# Patient Record
Sex: FEMALE | Race: WHITE | NOT HISPANIC OR LATINO | Employment: OTHER | URBAN - METROPOLITAN AREA
[De-identification: names, ages, dates, MRNs, and addresses within clinical notes are randomized per-mention and may not be internally consistent; named-entity substitution may affect disease eponyms.]

---

## 2019-10-31 ENCOUNTER — TRANSFERRED RECORDS (OUTPATIENT)
Dept: HEALTH INFORMATION MANAGEMENT | Facility: CLINIC | Age: 49
End: 2019-10-31

## 2020-01-17 ENCOUNTER — TRANSFERRED RECORDS (OUTPATIENT)
Dept: HEALTH INFORMATION MANAGEMENT | Facility: CLINIC | Age: 50
End: 2020-01-17

## 2020-05-28 ENCOUNTER — TRANSFERRED RECORDS (OUTPATIENT)
Dept: HEALTH INFORMATION MANAGEMENT | Facility: CLINIC | Age: 50
End: 2020-05-28

## 2020-07-20 NOTE — TELEPHONE ENCOUNTER
RECORDS RECEIVED FROM: External - New, newly dx'd w/CUETO   Appt Date: 09.28.2020   NOTES STATUS DETAILS   OFFICE NOTE from referring provider N/A    OFFICE NOTES from other specialists Care Everywhere 08.19.2020, Rivera Dowling MD      08.10.2020 Charles Rubin M.B.B.S [facility]   DISCHARGE SUMMARY from hospital Care Everywhere 07.29.2020 Matthew Oconnell MD     MEDICATION LIST N/A    LIVER BIOSPY (IF APPLICABLE)      PATHOLOGY REPORTS  N/A    IMAGING     ENDOSCOPY (IF AVAILABLE) Care Everywhere 10.15.2019   COLONOSCOPY (IF AVAILABLE) Care Everywhere 10.15.2019   ULTRASOUND LIVER Care Everywhere 07.31.2020, 07.30.2020,  06.08.2020 06.02.2020   CT OF ABDOMEN Care Everywhere 06.25.2020, 06.04.2020    MRI OF LIVER Care Everywhere  06.30.2020 12.04.2019   FIBROSCAN, US ELASTOGRAPHY, FIBROSIS SCAN, MR ELASTOGRAPHY N/A    LABS     HEPATIC PANEL (LIVER PANEL) Care Everywhere 06.30.2020   BASIC METABOLIC PANEL Care Everywhere 10.17.2019   COMPLETE METABOLIC PANEL Care Everywhere 06.30.2020   COMPLETE BLOOD COUNT (CBC) Care Everywhere 06.30.2020   INTERNATIONAL NORMALIZED RATIO (INR) Care Everywhere 03.04.2020   HEPATITIS C ANTIBODY Care Everywhere 10.22.2019   HEPATITIS C VIRAL LOAD/PCR N/A    HEPATITIS C GENOTYPE N/A    HEPATITIS B SURFACE ANTIGEN Care Everywhere 10.22.2019   HEPATITIS B SURFACE ANTIBODY Care Everywhere 10.29.2019   HEPATITIS B DNA QUANT LEVEL N/A    HEPATITIS B CORE ANTIBODY N/A      Action 08.17.2020 RM   Action Taken Called [facility] to get images pushed, called [phone] sent fax to [facility] for other images [phone]    08.19.2020 Sent to [facility] fax the fax number is [phone]    08.20.2020 All images received.

## 2020-07-29 ENCOUNTER — TRANSFERRED RECORDS (OUTPATIENT)
Dept: HEALTH INFORMATION MANAGEMENT | Facility: CLINIC | Age: 50
End: 2020-07-29

## 2020-07-31 ENCOUNTER — TRANSFERRED RECORDS (OUTPATIENT)
Dept: HEALTH INFORMATION MANAGEMENT | Facility: CLINIC | Age: 50
End: 2020-07-31

## 2020-07-31 LAB — EJECTION FRACTION: 60 %

## 2020-08-19 ENCOUNTER — TRANSFERRED RECORDS (OUTPATIENT)
Dept: HEALTH INFORMATION MANAGEMENT | Facility: CLINIC | Age: 50
End: 2020-08-19

## 2020-08-23 ENCOUNTER — TRANSFERRED RECORDS (OUTPATIENT)
Dept: HEALTH INFORMATION MANAGEMENT | Facility: CLINIC | Age: 50
End: 2020-08-23

## 2020-09-13 ENCOUNTER — MEDICAL CORRESPONDENCE (OUTPATIENT)
Dept: HEALTH INFORMATION MANAGEMENT | Facility: CLINIC | Age: 50
End: 2020-09-13

## 2020-09-14 ENCOUNTER — MEDICAL CORRESPONDENCE (OUTPATIENT)
Dept: HEALTH INFORMATION MANAGEMENT | Facility: CLINIC | Age: 50
End: 2020-09-14

## 2020-09-16 ENCOUNTER — REFERRAL (OUTPATIENT)
Dept: TRANSPLANT | Facility: CLINIC | Age: 50
End: 2020-09-16

## 2020-09-16 DIAGNOSIS — K75.81 NASH (NONALCOHOLIC STEATOHEPATITIS): Primary | ICD-10-CM

## 2020-09-16 NOTE — LETTER
10/12/2020    Susan Puckett  [address] [facility] [address]      Dear Susan,    Thank you for your interest in the Transplant Center at [facility], [facility]. We look forward to being a part of your care team and assisting you through the transplant process.    As we discussed, your transplant coordinator is Daja Suarez (Liver).  You may call your coordinator at any time with questions or concerns call [phone].    Please complete the following.    1. Fill out and return the enclosed forms    Authorization for Electronic Communication    Authorization to Discuss Protected Health Information    Authorization for Release of Protected Health Information    Authorization for Care Everywhere Release of Information    2. Sign up for:    [employer], access to your electronic medical record (see enclosed pamphlet)    [employer], a transplant education website    You can use these tools to learn more about your transplant, communicate with your care team, and track your medical details      Sincerely,  Solid Organ Transplant  [facility], [facility]    cc: Referring Physician and PCP

## 2020-09-16 NOTE — LETTER
LIVER TRANSPLANT  EVALUATION SCHEDULE    Patient:   Susan Puckett  MR#:    [id number]  Coordinator:  Daja       [phone]  :     Che               [phone]  Location:    [facility]  Date(s):    October 19, 2020 & October 20, 2020    This is your evaluation schedule, please follow dates and times.  You will   receive reminder phone calls for other tests, but please follow this schedule  only!  If you have any questions about dates and times, please call us on  number listed above.  Thank you, Transplant Services         Day/Date:    Monday, October 19, 2020  Time Location Activity   1:30 PM Telephone Visit: You will receive a Telephone call to: [phone] Appointment with Kelin Mcginnis  Registered Dietitian   2:00 PM Telephone Visit: You will receive a Telephone call to: [phone] Appointment with Juanita Wells                               *NO FOOD or DRINK AFTER 11:00 PM*  (You may only have small amounts of water)        Day/Date:    Tuesday, October 20, 2020  Time Location Activity   7:15 AM Imaging and Lab testing  (1st floor [facility]   [address] [place] [address]) Blood tests    7:30 AM Imaging and Lab testing  (1st floor [facility] ) Liver Ultrasound with dopplers=NO FOOD or DRINK8 HOURS BEFORE TEST!!!   8:30 AM Imaging and Lab Testing  (1st floor [facility] ) Dexa Scan HOLD Calcium pills & supplements 48 hours before test!   9:00 AM Transplant Services  (3rd floor [facility]) Appointment with Dr. Tejada,  Transplant Surgeon   9:40 AM Imaging and Lab testing  (1st floor [facility] ) EKG   *10:00 AM [employer] Shuttle  1st Floor/Entrance of Clinic [facility]  Take [employer] Shuttle to Hospital  (Shuttle is White with Maroon Lettering)   11:00 AM [facility] Waiting Room  (2nd floor [facility]  [address] [place] [address]) Dobutamine  Stress Echo = SEE ENCLOSED INSTRUCTIONS for TEST!

## 2020-09-25 VITALS — BODY MASS INDEX: 21.53 KG/M2 | HEIGHT: 66 IN | WEIGHT: 134 LBS

## 2020-09-25 PROBLEM — H02.409 PTOSIS OF EYELID: Status: ACTIVE | Noted: 2020-07-22

## 2020-09-25 PROBLEM — H53.40 VISUAL FIELD DEFECT: Status: ACTIVE | Noted: 2020-07-22

## 2020-09-25 PROBLEM — F41.9 ANXIETY: Status: ACTIVE | Noted: 2018-12-16

## 2020-09-25 PROBLEM — D61.818 PANCYTOPENIA (H): Status: ACTIVE | Noted: 2020-07-29

## 2020-09-25 PROBLEM — N80.9 ENDOMETRIOSIS: Status: ACTIVE | Noted: 2020-05-28

## 2020-09-25 PROBLEM — R79.89 LACTATE BLOOD INCREASE: Status: ACTIVE | Noted: 2020-09-09

## 2020-09-25 PROBLEM — J91.8 PLEURAL EFFUSION ASSOCIATED WITH HEPATIC DISORDER: Status: ACTIVE | Noted: 2020-08-11

## 2020-09-25 PROBLEM — D12.6 TUBULAR ADENOMA OF COLON: Status: ACTIVE | Noted: 2020-01-22

## 2020-09-25 PROBLEM — D12.6: Status: ACTIVE | Noted: 2020-01-22

## 2020-09-25 PROBLEM — R18.8 ASCITES: Status: ACTIVE | Noted: 2020-06-04

## 2020-09-25 PROBLEM — K52.9 CHRONIC DIARRHEA: Status: ACTIVE | Noted: 2020-05-28

## 2020-09-25 PROBLEM — K76.9 PLEURAL EFFUSION ASSOCIATED WITH HEPATIC DISORDER: Status: ACTIVE | Noted: 2020-08-11

## 2020-09-25 PROBLEM — N17.9 ACUTE KIDNEY INJURY (NONTRAUMATIC) (H): Status: ACTIVE | Noted: 2020-09-09

## 2020-09-25 PROBLEM — K74.60 NON-ALCOHOLIC CIRRHOSIS (H): Status: ACTIVE | Noted: 2020-07-09

## 2020-09-25 PROBLEM — K76.82 HEPATIC ENCEPHALOPATHY (H): Status: ACTIVE | Noted: 2020-09-09

## 2020-09-25 PROBLEM — R18.8: Status: ACTIVE | Noted: 2020-08-11

## 2020-09-25 PROBLEM — K52.9 COLITIS: Status: ACTIVE | Noted: 2019-10-13

## 2020-09-25 PROBLEM — E86.0 MODERATE DEHYDRATION: Status: ACTIVE | Noted: 2020-09-09

## 2020-09-25 PROBLEM — Z87.11 HISTORY OF GASTRIC ULCER: Status: ACTIVE | Noted: 2020-05-28

## 2020-09-25 PROBLEM — H57.813 BROW PTOSIS, BILATERAL: Status: ACTIVE | Noted: 2020-07-22

## 2020-09-25 PROBLEM — G44.89 OTHER HEADACHE SYNDROME: Status: ACTIVE | Noted: 2020-08-23

## 2020-09-25 PROBLEM — J94.8 HYDROTHORAX: Status: ACTIVE | Noted: 2020-07-29

## 2020-09-25 ASSESSMENT — MIFFLIN-ST. JEOR: SCORE: 1246.63

## 2020-09-25 NOTE — TELEPHONE ENCOUNTER
Patient was asked the following questions during liver intake call.     Referring Provider: Dr. Rivera Dowling   Referring Diagnosis: CUETO  PCP: Dr. Harleen Billy     1)Do you know why you have liver disease: Yes             If Alcoholic Cirrhosis is present when was your last drink: NA             Have you ever been through treatment for alcohol: No  2) Presence of Ascites: Yes Paracentesis: Yes, in the past.   3) Presence of Hepatic Encephalopathy: No Medications: Yes, lactulose  4) History of GI Bleeding: Yes, esoph varices.   5) Oxygen Use: None   6) EGD: Yes Where: [facility] When: 8/2020  7) Colonoscopy: Yes Where: [facility] When:2019  8) MELD Score: 16  9) Insurance information: [employer]       Policy danielson: Self      Subscriber/policy/ID number: [id number]      Group Number: [id number]    Referral intake process completed.  Patient is aware that after financial approval is received, medical records will be requested.   Patient confirmed for a callback from transplant coordinator on 9/28/2020.  Tentative evaluation date TBD.    Confirmed coordinator will discuss evaluation process in more detail at the time of their call.   Patient is aware of the need to arrange age appropriate cancer screening, vaccinations, and dental care.  Reminded patient to complete questionnaire, complete medical records release, and review packet prior to evaluation visit .  Assessed patient for special needs (ie--wheelchair, assistance, guardian, and ):  No  Patient instructed to call [phone] with questions.     STEF Kay, LPN   Solid Organ Transplant

## 2020-09-28 ENCOUNTER — VIRTUAL VISIT (OUTPATIENT)
Dept: GASTROENTEROLOGY | Facility: CLINIC | Age: 50
End: 2020-09-28
Attending: INTERNAL MEDICINE
Payer: COMMERCIAL

## 2020-09-28 ENCOUNTER — PRE VISIT (OUTPATIENT)
Dept: GASTROENTEROLOGY | Facility: CLINIC | Age: 50
End: 2020-09-28

## 2020-09-28 DIAGNOSIS — K74.60 CIRRHOSIS OF LIVER WITH ASCITES, UNSPECIFIED HEPATIC CIRRHOSIS TYPE (H): Primary | ICD-10-CM

## 2020-09-28 DIAGNOSIS — R18.8 CIRRHOSIS OF LIVER WITH ASCITES, UNSPECIFIED HEPATIC CIRRHOSIS TYPE (H): Primary | ICD-10-CM

## 2020-09-28 RX ORDER — FOLIC ACID 1 MG/1
1 TABLET ORAL EVERY MORNING
COMMUNITY
Start: 2020-02-26

## 2020-09-28 RX ORDER — ATORVASTATIN CALCIUM 20 MG/1
20 TABLET, FILM COATED ORAL EVERY EVENING
Status: ON HOLD | COMMUNITY
Start: 2020-01-09 | End: 2022-11-10

## 2020-09-28 RX ORDER — SUMATRIPTAN 50 MG/1
100 TABLET, FILM COATED ORAL
COMMUNITY
Start: 2020-08-28 | End: 2022-02-28

## 2020-09-28 RX ORDER — FUROSEMIDE 40 MG
40 TABLET ORAL 2 TIMES DAILY
Status: ON HOLD | COMMUNITY
Start: 2020-06-29 | End: 2020-12-08

## 2020-09-28 RX ORDER — TRAZODONE HYDROCHLORIDE 100 MG/1
100 TABLET ORAL AT BEDTIME
COMMUNITY
Start: 2020-08-27 | End: 2020-11-05

## 2020-09-28 RX ORDER — LANOLIN ALCOHOL/MO/W.PET/CERES
1000 CREAM (GRAM) TOPICAL
COMMUNITY
Start: 2020-09-02

## 2020-09-28 RX ORDER — VITAMIN E 268 MG
400 CAPSULE ORAL DAILY
Status: ON HOLD | COMMUNITY
Start: 2020-09-02 | End: 2022-11-10

## 2020-09-28 RX ORDER — CALCIUM CARBONATE 500 MG/1
1 TABLET, CHEWABLE ORAL DAILY PRN
Status: ON HOLD | COMMUNITY
End: 2022-02-15

## 2020-09-28 RX ORDER — ONDANSETRON 4 MG/1
4 TABLET, ORALLY DISINTEGRATING ORAL EVERY 6 HOURS PRN
Status: ON HOLD | COMMUNITY
Start: 2019-10-17 | End: 2021-11-08

## 2020-09-28 RX ORDER — CHOLECALCIFEROL (VITAMIN D3) 125 MCG
10000 CAPSULE ORAL DAILY
Status: ON HOLD | COMMUNITY
Start: 2019-12-05 | End: 2022-11-10

## 2020-09-28 RX ORDER — PHYTONADIONE (VIT K1) 100 MCG
100 TABLET ORAL EVERY MORNING
Status: ON HOLD | COMMUNITY
Start: 2019-12-05 | End: 2021-12-09

## 2020-09-28 RX ORDER — HYDROXYZINE HYDROCHLORIDE 25 MG/1
25 TABLET, FILM COATED ORAL EVERY EVENING
COMMUNITY
Start: 2020-04-20 | End: 2020-11-05

## 2020-09-28 RX ORDER — PSYLLIUM HUSK 0.4 G
4000 CAPSULE ORAL
COMMUNITY
Start: 2019-12-05 | End: 2021-07-12

## 2020-09-28 RX ORDER — MULTIVITAMIN
1 CAPSULE ORAL DAILY
Status: ON HOLD | COMMUNITY
End: 2021-12-28

## 2020-09-28 RX ORDER — VALACYCLOVIR HYDROCHLORIDE 1 G/1
1000 TABLET, FILM COATED ORAL DAILY PRN
Status: ON HOLD | COMMUNITY
Start: 2020-03-25 | End: 2022-11-01

## 2020-09-28 RX ORDER — RIBOFLAVIN (VITAMIN B2) 100 MG
1000 TABLET ORAL EVERY MORNING
COMMUNITY
End: 2021-11-03

## 2020-09-28 RX ORDER — LACTULOSE 10 G/15ML
20 SOLUTION ORAL 2 TIMES DAILY
Status: ON HOLD | COMMUNITY
Start: 2020-08-19 | End: 2020-12-08

## 2020-09-28 RX ORDER — SPIRONOLACTONE 100 MG/1
200 TABLET, FILM COATED ORAL DAILY
COMMUNITY
Start: 2020-08-01 | End: 2021-07-06

## 2020-09-28 RX ORDER — LANCETS 30 GAUGE
1 EACH MISCELLANEOUS
Status: ON HOLD | COMMUNITY
Start: 2019-01-11 | End: 2020-10-25

## 2020-09-28 RX ORDER — BLOOD-GLUCOSE METER
EACH MISCELLANEOUS
Status: ON HOLD | COMMUNITY
Start: 2019-01-11 | End: 2020-10-25

## 2020-09-28 ASSESSMENT — PAIN SCALES - GENERAL: PAINLEVEL: NO PAIN (0)

## 2020-09-28 NOTE — LETTER
"    9/28/2020         RE: Susan Puckett  [address] [facility] [address]        Dear Colleague,    Thank you for referring your patient, Susan Puckett, to the [facility] HEPATOLOGY. Please see a copy of my visit note below.    Susan Puckett is a 50 year old female who is being evaluated via a billable video visit.      The patient has been notified of following:     \"This video visit will be conducted via a call between you and your physician/provider. We have found that certain health care needs can be provided without the need for an in-person physical exam.  This service lets us provide the care you need with a video conversation.  If a prescription is necessary we can send it directly to your pharmacy.  If lab work is needed we can place an order for that and you can then stop by our lab to have the test done at a later time.    Video visits are billed at different rates depending on your insurance coverage.  Please reach out to your insurance provider with any questions.    If during the course of the call the physician/provider feels a video visit is not appropriate, you will not be charged for this service.\"    Patient has given verbal consent for Video visit? Yes  How would you like to obtain your AVS? MyChart  If you are dropped from the video visit, the video invite should be resent to: Text to cell phone: [phone]  Will anyone else be joining your video visit? No        Video-Visit Details    I had the pleasure of seeing Susan Puckett for consultation in the liver transplant clinic at the [facility] via video visit on September 28, 2020.  Ms. 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    She reports that she was first diagnosed with liver disease about 1 year ago.  She has had a number of complications of liver disease since that time.  She is been hospitalized with hepatic encephalopathy at least 2 " times.  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  She also has had an upper GI endoscopy which shows small esophageal varices.    At present though she seems to be doing fairly well.  She does complain of some abdominal discomfort, which is fairly mild.  She does complain of itching which she attributes to very dry skin.  She also complains of dry mouth.  She denies any increased abdominal girth or lower extremity edema.  She has never had gastrointestinal bleeding and currently has no signs of hepatic encephalopathy.    She denies any fevers or chills, cough or shortness of breath.  She does note some dyspnea on exertion.  She does note some chronic nausea and occasional vomiting.  She states her appetite is poor and she is trying to maintain her weight.    Past medical history: She had gastric bypass surgery about 16 years ago and believes that she lost about 80 pounds in weight.  She does not believe that she was diabetic or hypertensive at that time.  She also has had a tonsillectomy and appendectomy.  She has been told that she is prediabetic and has been on metformin.    She does not use any alcohol or tobacco.  She was on a video visit with her .  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    Family history: Strongly positive for diabetes on her father side of the family.  She has a cousin who was transplanted because of fatty liver disease.    A complete review of systems was negative other than that noted above.    Current Outpatient Medications   Medication     atorvastatin (LIPITOR) 20 MG tablet     blood glucose monitoring (ACCU-CHEK AUDREY PLUS) meter device kit     calcium carbonate (TUMS) 500 MG chewable tablet     Calcium Citrate 200 MG TABS     cholecalciferol (VITAMIN D-1000 MAX ST) 25 MCG (1000 UT) TABS     cyanocobalamin (VITAMIN B-12) 1000 MCG tablet     Ferrous Sulfate (IRON) 325 (65  FE) MG tablet     folic acid (FOLVITE) 1 MG tablet     furosemide (LASIX) 20 MG tablet     hydrOXYzine (ATARAX) 25 MG tablet     lactulose (CHRONULAC) 10 GM/15ML solution     Lancets MISC     levothyroxine (SYNTHROID) 175 MCG tablet     metFORMIN (GLUCOPHAGE) 1000 MG tablet     multivitamin (DEKAS ESSENTIAL) capsule     omeprazole (PRILOSEC) 20 MG DR capsule     ondansetron (ZOFRAN-ODT) 4 MG ODT tab     rifaximin (XIFAXAN) 550 MG TABS tablet     spironolactone (ALDACTONE) 100 MG tablet     SUMAtriptan (IMITREX) 50 MG tablet     traZODone (DESYREL) 100 MG tablet     valACYclovir (VALTREX) 1000 mg tablet     vitamin A 3 MG (39154 UNITS) capsule     vitamin C (ASCORBIC ACID) 100 MG tablet     vitamin E (TOCOPHEROL) 400 units (180 mg) capsule     Vitamin K, Phytonadione, 100 MCG TABS     acetaminophen-codeine 300-30 MG per tablet     ORDER FOR DME     No current facility-administered medications for this visit.      On physical examination, she does look chronically ill.  HEENT exam shows no scleral icterus but moderate temporal muscle wasting.  Neurologic exam shows no asterixis.    Her most recent laboratory tests were from 1 month ago and showed her white count was 2.9, hemoglobin is 12.7 and platelets are 104,000.  Her INR was 1.2.  Her sodium was 132, potassium was 4.6, BUN was 9 and creatinine 0.76.  Her AST is 88, ALT is 46 and alkaline phosphatase is 133 total bilirubin is 1.3 with direct direct bilirubin of 0.6.  Her albumin is 3.0 with a total protein of 7.4.    My impression is that Ms. Puckett has a cirrhosis most likely on the basis of nonalcoholic fatty liver disease.  Her last meld score calculates to be 16.  Her quality life is really quite poor and I think she may benefit from liver transplantation.  With that low meld score, she would likely need to undergo live donor liver transplantation, which she was aware of.  I thus will go ahead and initiate her transplant evaluation.    She is otherwise  up-to-date with regard to variceal and cancer screening.  I did spend much of my visit answering her many questions.    Thank you very much for allowing me to participate in the care of this patient.  If you have any questions regarding my recommendations, please do not hesitate to contact me.         Raphael Cook MD      Professor of Medicine  [employer]      Executive Medical Director, Solid Organ Transplant Program  [facility] Type of service:  Video Visit    Video Start Time: 3:30 PM  Video End Time: 3:55 PM    Originating Location (pt. Location): Home    Distant Location (provider location):  [facility] HEPATOLOGY     Platform used for Video Visit: [facility] Directions        Again, thank you for allowing me to participate in the care of your patient.        Sincerely,        Raphael Cook MD

## 2020-09-28 NOTE — PROGRESS NOTES
"Susan Puckett is a 50 year old female who is being evaluated via a billable video visit.      The patient has been notified of following:     \"This video visit will be conducted via a call between you and your physician/provider. We have found that certain health care needs can be provided without the need for an in-person physical exam.  This service lets us provide the care you need with a video conversation.  If a prescription is necessary we can send it directly to your pharmacy.  If lab work is needed we can place an order for that and you can then stop by our lab to have the test done at a later time.    Video visits are billed at different rates depending on your insurance coverage.  Please reach out to your insurance provider with any questions.    If during the course of the call the physician/provider feels a video visit is not appropriate, you will not be charged for this service.\"    Patient has given verbal consent for Video visit? Yes  How would you like to obtain your AVS? MyChart  If you are dropped from the video visit, the video invite should be resent to: Text to cell phone: [phone]  Will anyone else be joining your video visit? No        Video-Visit Details    I had the pleasure of seeing Susan Puckett for consultation in the liver transplant clinic at the [facility] via video visit on September 28, 2020.  Ms. 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    She reports that she was first diagnosed with liver disease about 1 year ago.  She has had a number of complications of liver disease since that time.  She is been hospitalized with hepatic encephalopathy at least 2 times.  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  She also has had an upper GI endoscopy which shows small esophageal varices.    At present though she seems to be doing fairly well.  She " does complain of some abdominal discomfort, which is fairly mild.  She does complain of itching which she attributes to very dry skin.  She also complains of dry mouth.  She denies any increased abdominal girth or lower extremity edema.  She has never had gastrointestinal bleeding and currently has no signs of hepatic encephalopathy.    She denies any fevers or chills, cough or shortness of breath.  She does note some dyspnea on exertion.  She does note some chronic nausea and occasional vomiting.  She states her appetite is poor and she is trying to maintain her weight.    Past medical history: She had gastric bypass surgery about 16 years ago and believes that she lost about 80 pounds in weight.  She does not believe that she was diabetic or hypertensive at that time.  She also has had a tonsillectomy and appendectomy.  She has been told that she is prediabetic and has been on metformin.    She does not use any alcohol or tobacco.  She was on a video visit with her .  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    Family history: Strongly positive for diabetes on her father side of the family.  She has a cousin who was transplanted because of fatty liver disease.    A complete review of systems was negative other than that noted above.    Current Outpatient Medications   Medication     atorvastatin (LIPITOR) 20 MG tablet     blood glucose monitoring (ACCU-CHEK AUDREY PLUS) meter device kit     calcium carbonate (TUMS) 500 MG chewable tablet     Calcium Citrate 200 MG TABS     cholecalciferol (VITAMIN D-1000 MAX ST) 25 MCG (1000 UT) TABS     cyanocobalamin (VITAMIN B-12) 1000 MCG tablet     Ferrous Sulfate (IRON) 325 (65 FE) MG tablet     folic acid (FOLVITE) 1 MG tablet     furosemide (LASIX) 20 MG tablet     hydrOXYzine (ATARAX) 25 MG tablet     lactulose (CHRONULAC) 10 GM/15ML solution     Lancets MISC     levothyroxine (SYNTHROID) 175 MCG tablet      metFORMIN (GLUCOPHAGE) 1000 MG tablet     multivitamin (DEKAS ESSENTIAL) capsule     omeprazole (PRILOSEC) 20 MG DR capsule     ondansetron (ZOFRAN-ODT) 4 MG ODT tab     rifaximin (XIFAXAN) 550 MG TABS tablet     spironolactone (ALDACTONE) 100 MG tablet     SUMAtriptan (IMITREX) 50 MG tablet     traZODone (DESYREL) 100 MG tablet     valACYclovir (VALTREX) 1000 mg tablet     vitamin A 3 MG (95346 UNITS) capsule     vitamin C (ASCORBIC ACID) 100 MG tablet     vitamin E (TOCOPHEROL) 400 units (180 mg) capsule     Vitamin K, Phytonadione, 100 MCG TABS     acetaminophen-codeine 300-30 MG per tablet     ORDER FOR DME     No current facility-administered medications for this visit.      On physical examination, she does look chronically ill.  HEENT exam shows no scleral icterus but moderate temporal muscle wasting.  Neurologic exam shows no asterixis.    Her most recent laboratory tests were from 1 month ago and showed her white count was 2.9, hemoglobin is 12.7 and platelets are 104,000.  Her INR was 1.2.  Her sodium was 132, potassium was 4.6, BUN was 9 and creatinine 0.76.  Her AST is 88, ALT is 46 and alkaline phosphatase is 133 total bilirubin is 1.3 with direct direct bilirubin of 0.6.  Her albumin is 3.0 with a total protein of 7.4.    My impression is that Ms. Puckett has a cirrhosis most likely on the basis of nonalcoholic fatty liver disease.  Her last meld score calculates to be 16.  Her quality life is really quite poor and I think she may benefit from liver transplantation.  With that low meld score, she would likely need to undergo live donor liver transplantation, which she was aware of.  I thus will go ahead and initiate her transplant evaluation.    She is otherwise up-to-date with regard to variceal and cancer screening.  I did spend much of my visit answering her many questions.    Thank you very much for allowing me to participate in the care of this patient.  If you have any questions regarding my  recommendations, please do not hesitate to contact me.         Raphael Cook MD      Professor of Medicine  [employer] [employer]      Executive Medical Director, Solid Organ Transplant Program  [facility] Type of service:  Video Visit    Video Start Time: 3:30 PM  Video End Time: 3:55 PM    Originating Location (pt. Location): Home    Distant Location (provider location):   [facility] HEPATOLOGY     Platform used for Video Visit: [employer]

## 2020-09-28 NOTE — TELEPHONE ENCOUNTER
"Referred by: Dr. Rivera Mary/ GI  PCP:  Dr. Harleen Billy        50 year old with a history of CUETO diagnosed on 10/2019 when admitted with abd pain and KARL, imaging showed cirrhosis liver. MRA 6/30/20 Chronic liver disease with elevated stiffness consistent with advanced fibrosis. Mild fatty change.    MELD 10 from 9/10/2020 : T Bili 0.8, INR 1.4, Creat 0.76, Na 132    Ascites -  Yes, diuretic managed  Paracentesis- 2 times  Thoracentesis- once 7/2020  HE - yes  GIB - gastric ulcer and GIB 2019.  EGD - 8/24/20 Grade I esophageal varices   Colonoscopy - 10/2019 prep poor. \"Tubular adenoma of colon 1/22/2020\"      Social History  Lives with  and adult children  Working:medical leave , applying long term disability  ADL's:independent     PMH:  DM2, hypothyroidism, depression, anxiety     Surgical History: RNY gastric bypass 2006, breast reduction, appy, hysterectomy,      Nicotine:  no         Alcohol/non prescribed drugs: no    Plan: Consult with Dr. Cook already scheduled for today. Will follow up with patient after seen by provider.     Contacted patient and introduced myself as their Transplant Coordinator, also introduced the role of the Transplant Coordinator in the transplant process. She reports fatigue, weight loss and \"brain fog\" possibly having live donors.   "

## 2020-09-29 NOTE — TELEPHONE ENCOUNTER
Spoke to Susan and will have transplant evaluation on 10/19 and 10/20 with her MELD, live donor is optimal.   Explained the purpose of this call including reviewing next steps and answering questions.  Confirmed Referring Provider and Primary Care Physician. Notified patient of the importance of continued communication with referring providers and primary care physicians.  Reviewed components of transplant evaluation process including necessary appointments, tests, and procedures.  Answered questions for patient regarding evaluation, provided my name and contact information and requested they call with any additional questions.  Notified patient that the schedule will be e-mailed.      Orders placed, surgeon in person.

## 2020-10-08 NOTE — TELEPHONE ENCOUNTER
Spoke with the patient and confirmed Evals: Pre-Liver Eval appointments on 10/19/20 and 10/20/20.  Informed patient an itinerary can be accessed via [employer], and will be sent via email.

## 2020-10-19 ENCOUNTER — TELEPHONE (OUTPATIENT)
Dept: TRANSPLANT | Facility: CLINIC | Age: 50
End: 2020-10-19

## 2020-10-19 ENCOUNTER — ALLIED HEALTH/NURSE VISIT (OUTPATIENT)
Dept: TRANSPLANT | Facility: CLINIC | Age: 50
End: 2020-10-19
Attending: INTERNAL MEDICINE
Payer: COMMERCIAL

## 2020-10-19 DIAGNOSIS — Z91.89 AT RISK FOR INEFFECTIVE COPING: ICD-10-CM

## 2020-10-19 DIAGNOSIS — K74.60 CIRRHOSIS OF LIVER (H): Primary | ICD-10-CM

## 2020-10-19 DIAGNOSIS — K75.81 NASH (NONALCOHOLIC STEATOHEPATITIS): Primary | ICD-10-CM

## 2020-10-19 PROCEDURE — 99207 PR NO CHARGE LOS: CPT | Mod: TEL | Performed by: DIETITIAN, REGISTERED

## 2020-10-19 NOTE — PROGRESS NOTES
"Susan Puckett is a 50 year old female who is being evaluated via a billable telephone visit.      The patient has been notified of following:     \"This telephone visit will be conducted via a call between you and your physician/provider. We have found that certain health care needs can be provided without the need for a physical exam.  This service lets us provide the care you need with a short phone conversation.  If a prescription is necessary we can send it directly to your pharmacy.  If lab work is needed we can place an order for that and you can then stop by our lab to have the test done at a later time.    Telephone visits are billed at different rates depending on your insurance coverage. During this emergency period, for some insurers they may be billed the same as an in-person visit.  Please reach out to your insurance provider with any questions.    If during the course of the call the physician/provider feels a telephone visit is not appropriate, you will not be charged for this service.\"    Patient has given verbal consent for Telephone visit?  Yes    Phone call duration: 30 minutes    [facility] Solid [facility] Transplant  Outpatient MNT: Liver Transplant Evaluation    Current BMI: 22 (HT 65.5 in,  lbs/61 kg)- data 9/25  BMI is within recommendation of <40 for liver transplant     Time Spent: 30 minutes  Visit Type: Initial   Referring Physician: Kieran  Pt accompanied by: self    History of previous txp: none    Nutrition Assessment  Pt reports trying to follow a Mediterranean diet, but is extremely nauseous; cooking makes her nauseous; meats are not appealing (new); can tolerate chicken occasionally   Eggs- ok but has to be craving them  PB- on toast/celery     Just starting to test BG again/DM 2--> yesterday; is on Metformin; plans to follow up with Endo. This could be part of reason she has been losing weight.      Pt reports no strong food cravings; may have a craving but " quickly passes  Takes pills with water; clear protein drinks diluted down/too sugary    Gastric bypass 2006; still limited to small portions    RD through [facility]- concerned with weight loss- grab cheese stick, pb toast, discusses importance of high protein with pt    Appetite: none for a while x 2-3 months; no taste    Vitamins, Supplements, Pertinent Meds:   Herbal Medicines/Supplements: none  Protein supplement: not currently, but has protein powder ([facility] projym ultra premium); txp coord recommended mix with Fair Life milk to help reduce undesired protein taste; pt reports even tasting this with ice cream     Weight hx: 125 lbs now; highest 170 this year, usually 150-170; has lost a lot of water weight, dry weight, and muscle mass (pt reports all over severe muscle wasting)     Edema: ?ascites, no RADHA    Diet Recall  Today- 1/4 banana, eggs with toast, meat s/w (just started to eat but unable to finish), Greek yogurt  Janis- milk (12 oz 2%), water, apple juice (8 oz)   Eating out- rare, but just a few bites    Physical Activity  None d/t no energy     Anthropometrics   IBW/%IBW: 128/105  Dosing wt: 134 lbs/61 kg- data from 9/25    Malnutrition  % Intake: <75% for >/= 3 months (non-severe malnutrition)  % Weight Loss: difficult to determine with combination of fluid/dry wt loss  Subcutaneous Fat Loss: Likely moderate, but unable to assess  Muscle Loss: Severe per pt   Fluid Accumulation/Edema: Unknown per pt   Malnutrition Diagnosis: Unable to determine due to inability to visualize pt, but at minimum meets criteria for non-severe malnutrition in the context of chronic illness    Estimated Protein Needs  60-75+ g/day (1-1.2+ g/kg) for repletion with self reported muscle loss    Nutrition Diagnosis  Food and nutrition related knowledge deficit r/t pre liver transplant eval AEB pt verbalized not hearing pre/post transplant diet guidelines.    Nutrition Intervention  Nutrition education provided:  Would not worry  about sodium restriction at this time, as pt's PO intake is very limited.     Reviewed adequate protein intake. Encouraged receiving protein from both animal and plant based sources. Consider cheese sticks, peanut butter, trail mix, Greek yogurt, eggs, protein bars (pt likes Kind), protein powders (consider simpler formula / generic; Target has single-serve packs of various brands to try). Consider protein 1 ounce serving (Liquacel or Prostat). Provided pt with this info via My Chart.     Reviewed post txp diet guidelines in brief (will review in further detail post txp):  (1) Review of proper food safety measures d/t immunosuppressant therapy post-op and increased risk for food-borne illness    (2) Avoid the following post txp d/t risk for rejection, unknown effects on the organs, and/or potential interactions with immunosuppressants:  - Herbal, Chinese, holistic, chiropractic, natural, alternative medicines and supplements  - Detoxes and cleanses  - Weight loss pills  - Protein powders or other products with extracts or herbs (ie green tea extract)    (3) Med regimen and possible side effects    Patient Understanding: Pt verbalized understanding of education provided.  Expected Engagement: Good  Follow-Up Plans: PRN     Nutrition Goals  1. Consider 1 protein supplement/day with ideal minimum intake of 60 g/day  2. Pt to verbalize understanding of 3 aspects of post txp education provided      Provided pt with contact info.   Kelin Mcginnis, RD, LD, CCTD  [facility] [phone]

## 2020-10-19 NOTE — TELEPHONE ENCOUNTER
Liver Transplant Evaluation/Teaching  TEACHING TOPICS: Evaluation Process, Evaluation Items, Diagnostic Studies, Consultation, Chemical Dependency Policy, Donor Source, Liver Allocation, MELD System, UNOS, Waiting List, Follow up while on transplant list, Follow up after transplantation, Infection and Rejection, Immunosuppression , Medication Teaching, Lab Recording after transplant, Laboratory Frequency after transplant , Consent for evaluation and One year survival rates  INSTRUCTIONAL MATERIAL USED/GIVEN: Liver Transplant Handbook, MELD Booklet, Donor Booklet, Web Sites Options, Verbal instructions, Multiple Listing Brochure , Consent for evaluation signed, One year survival rates and SRTR (Scientific Registry) Data  Person(s) involved in teaching: Susan and   Asks Questions: YES  Eager to Learn: YES  Cooperative: YES  Receptive (willing/able to accept information): YES  Reason for the appointments, diagnosis and treatment plan:YES  Knowledge of proper use of medications and conditions for which they are ordered (with special attention to potential side effects or drug interactions): YES  Which situations necessitate calling provider and whom to contact:YES  Other: will review my transplant place  Teaching Concerns Addressed   Comments: Still needs to sign receipt of information and substance contract, Spent considerable amount of time discussing the need now, like post recovery, to eat, despite no appetite and get up and move despite pain or low energy. Agreed to consult with individual therapist for support.  Patient has name and phone number of transplant coordinator.

## 2020-10-19 NOTE — PATIENT INSTRUCTIONS
Vivek Davis,    Your protein goal is 60-75 grams per day. Don't worry if you are getting less than this, but keep these numbers in mind for an eventual goal. Protein supplements help you get to this goal range quicker.     Sources of protein: eggs, cheese sticks, Greek yogurt, nuts/trail mix, peanut butter (on apple or banana or toast), protein bars (Kind, Mable, other brands)    Can try single-serve protein powders at Target to see what you like; mix with yogurt, ice cream, Fair Life milk, berries, chocolate, etc    Concentrated protein (Amazon): Prostat sugar free or Liquacel (1 ounce = 15 grams)    For nausea: consider trying ash or peppermint tea, ash chews (Gin Gins) 30 min before you want to eat    Please let me know if you have any questions!    Kelin Mcginnis, RD, LD, CCTD  [email or web address]

## 2020-10-20 ENCOUNTER — ANCILLARY PROCEDURE (OUTPATIENT)
Dept: BONE DENSITY | Facility: CLINIC | Age: 50
End: 2020-10-20
Attending: INTERNAL MEDICINE
Payer: COMMERCIAL

## 2020-10-20 ENCOUNTER — TELEPHONE (OUTPATIENT)
Dept: TRANSPLANT | Facility: CLINIC | Age: 50
End: 2020-10-20

## 2020-10-20 ENCOUNTER — ANCILLARY PROCEDURE (OUTPATIENT)
Dept: ULTRASOUND IMAGING | Facility: CLINIC | Age: 50
End: 2020-10-20
Attending: INTERNAL MEDICINE
Payer: COMMERCIAL

## 2020-10-20 ENCOUNTER — HOSPITAL ENCOUNTER (OUTPATIENT)
Dept: CARDIOLOGY | Facility: CLINIC | Age: 50
End: 2020-10-20
Attending: INTERNAL MEDICINE
Payer: COMMERCIAL

## 2020-10-20 ENCOUNTER — COMMITTEE REVIEW (OUTPATIENT)
Dept: TRANSPLANT | Facility: CLINIC | Age: 50
End: 2020-10-20

## 2020-10-20 ENCOUNTER — OFFICE VISIT (OUTPATIENT)
Dept: TRANSPLANT | Facility: CLINIC | Age: 50
End: 2020-10-20
Attending: INTERNAL MEDICINE
Payer: COMMERCIAL

## 2020-10-20 VITALS
SYSTOLIC BLOOD PRESSURE: 125 MMHG | DIASTOLIC BLOOD PRESSURE: 87 MMHG | WEIGHT: 126.7 LBS | HEART RATE: 94 BPM | OXYGEN SATURATION: 99 % | BODY MASS INDEX: 20.76 KG/M2

## 2020-10-20 DIAGNOSIS — K75.81 NASH (NONALCOHOLIC STEATOHEPATITIS): ICD-10-CM

## 2020-10-20 DIAGNOSIS — K74.60 CIRRHOSIS OF LIVER (H): Primary | ICD-10-CM

## 2020-10-20 DIAGNOSIS — E55.9 VITAMIN D DEFICIENCY: ICD-10-CM

## 2020-10-20 DIAGNOSIS — M85.80 OSTEOPENIA: ICD-10-CM

## 2020-10-20 LAB
ABO + RH BLD: NORMAL
ABO + RH BLD: NORMAL
AFP SERPL-MCNC: <1.5 UG/L (ref 0–8)
BLD GP AB SCN SERPL QL: NORMAL
BLD GP AB SCN TITR SERPL: NORMAL {TITER}
BLOOD BANK CMNT PATIENT-IMP: NORMAL
CMV IGG SERPL QL IA: >8 AI (ref 0–0.8)
DEPRECATED CALCIDIOL+CALCIFEROL SERPL-MC: 14 UG/L (ref 20–75)
EBV VCA IGG SER QL IA: >8 AI (ref 0–0.8)
ERYTHROCYTE [DISTWIDTH] IN BLOOD BY AUTOMATED COUNT: 13.9 % (ref 10–15)
HBV SURFACE AB SERPL IA-ACNC: 3.84 M[IU]/ML
HBV SURFACE AG SERPL QL IA: NONREACTIVE
HCG SERPL QL: NEGATIVE
HCT VFR BLD AUTO: 36.3 % (ref 35–47)
HGB BLD-MCNC: 12.4 G/DL (ref 11.7–15.7)
HIV 1+2 AB+HIV1 P24 AG SERPL QL IA: NONREACTIVE
MCH RBC QN AUTO: 34.3 PG (ref 26.5–33)
MCHC RBC AUTO-ENTMCNC: 34.2 G/DL (ref 31.5–36.5)
MCV RBC AUTO: 101 FL (ref 78–100)
PLATELET # BLD AUTO: 139 10E9/L (ref 150–450)
RBC # BLD AUTO: 3.61 10E12/L (ref 3.8–5.2)
SPECIMEN EXP DATE BLD: NORMAL
T PALLIDUM AB SER QL: NONREACTIVE
TRANSFERRIN SERPL-MCNC: 175 MG/DL (ref 210–360)
WBC # BLD AUTO: 5.8 10E9/L (ref 4–11)

## 2020-10-20 PROCEDURE — 99000 SPECIMEN HANDLING OFFICE-LAB: CPT | Performed by: PATHOLOGY

## 2020-10-20 PROCEDURE — 93321 DOPPLER ECHO F-UP/LMTD STD: CPT | Mod: 26 | Performed by: INTERNAL MEDICINE

## 2020-10-20 PROCEDURE — 86850 RBC ANTIBODY SCREEN: CPT | Mod: 90 | Performed by: PATHOLOGY

## 2020-10-20 PROCEDURE — 93016 CV STRESS TEST SUPVJ ONLY: CPT | Performed by: INTERNAL MEDICINE

## 2020-10-20 PROCEDURE — 77080 DXA BONE DENSITY AXIAL: CPT | Performed by: INTERNAL MEDICINE

## 2020-10-20 PROCEDURE — 93325 DOPPLER ECHO COLOR FLOW MAPG: CPT | Mod: 26 | Performed by: INTERNAL MEDICINE

## 2020-10-20 PROCEDURE — 82105 ALPHA-FETOPROTEIN SERUM: CPT | Mod: 90 | Performed by: PATHOLOGY

## 2020-10-20 PROCEDURE — 86481 TB AG RESPONSE T-CELL SUSP: CPT | Mod: 90 | Performed by: PATHOLOGY

## 2020-10-20 PROCEDURE — 36415 COLL VENOUS BLD VENIPUNCTURE: CPT | Performed by: PATHOLOGY

## 2020-10-20 PROCEDURE — 86665 EPSTEIN-BARR CAPSID VCA: CPT | Mod: 90 | Performed by: PATHOLOGY

## 2020-10-20 PROCEDURE — 84466 ASSAY OF TRANSFERRIN: CPT | Mod: 90 | Performed by: PATHOLOGY

## 2020-10-20 PROCEDURE — 76700 US EXAM ABDOM COMPLETE: CPT | Performed by: RADIOLOGY

## 2020-10-20 PROCEDURE — 255N000002 HC RX 255 OP 636: Performed by: INTERNAL MEDICINE

## 2020-10-20 PROCEDURE — 85027 COMPLETE CBC AUTOMATED: CPT | Performed by: PATHOLOGY

## 2020-10-20 PROCEDURE — 86706 HEP B SURFACE ANTIBODY: CPT | Mod: 90 | Performed by: PATHOLOGY

## 2020-10-20 PROCEDURE — 86900 BLOOD TYPING SEROLOGIC ABO: CPT | Mod: 90 | Performed by: PATHOLOGY

## 2020-10-20 PROCEDURE — 82306 VITAMIN D 25 HYDROXY: CPT | Mod: 90 | Performed by: PATHOLOGY

## 2020-10-20 PROCEDURE — 93975 VASCULAR STUDY: CPT

## 2020-10-20 PROCEDURE — 93000 ELECTROCARDIOGRAM COMPLETE: CPT | Performed by: INTERNAL MEDICINE

## 2020-10-20 PROCEDURE — 93350 STRESS TTE ONLY: CPT | Mod: 26 | Performed by: INTERNAL MEDICINE

## 2020-10-20 PROCEDURE — 86886 COOMBS TEST INDIRECT TITER: CPT | Mod: 90 | Performed by: PATHOLOGY

## 2020-10-20 PROCEDURE — 93017 CV STRESS TEST TRACING ONLY: CPT

## 2020-10-20 PROCEDURE — 250N000011 HC RX IP 250 OP 636: Performed by: INTERNAL MEDICINE

## 2020-10-20 PROCEDURE — 99204 OFFICE O/P NEW MOD 45 MIN: CPT | Performed by: TRANSPLANT SURGERY

## 2020-10-20 PROCEDURE — 250N000009 HC RX 250: Performed by: INTERNAL MEDICINE

## 2020-10-20 PROCEDURE — 86901 BLOOD TYPING SEROLOGIC RH(D): CPT | Mod: 90 | Performed by: PATHOLOGY

## 2020-10-20 PROCEDURE — 80321 ALCOHOLS BIOMARKERS 1OR 2: CPT | Mod: 90 | Performed by: PATHOLOGY

## 2020-10-20 PROCEDURE — 99N1110 PR STATISTIC HIV 1/2 ANTIGEN/ANTIBODY PRETRANSPLANT ONLY: Mod: 90 | Performed by: PATHOLOGY

## 2020-10-20 PROCEDURE — 86780 TREPONEMA PALLIDUM: CPT | Mod: 90 | Performed by: PATHOLOGY

## 2020-10-20 PROCEDURE — 93018 CV STRESS TEST I&R ONLY: CPT | Performed by: INTERNAL MEDICINE

## 2020-10-20 PROCEDURE — 84703 CHORIONIC GONADOTROPIN ASSAY: CPT | Performed by: PATHOLOGY

## 2020-10-20 PROCEDURE — 86644 CMV ANTIBODY: CPT | Mod: 90 | Performed by: PATHOLOGY

## 2020-10-20 PROCEDURE — 87340 HEPATITIS B SURFACE AG IA: CPT | Mod: 90 | Performed by: PATHOLOGY

## 2020-10-20 RX ORDER — METOPROLOL TARTRATE 1 MG/ML
1-20 INJECTION, SOLUTION INTRAVENOUS
Status: ACTIVE | OUTPATIENT
Start: 2020-10-20 | End: 2020-10-20

## 2020-10-20 RX ORDER — ATROPINE SULFATE 0.4 MG/ML
.2-2 AMPUL (ML) INJECTION
Status: COMPLETED | OUTPATIENT
Start: 2020-10-20 | End: 2020-10-20

## 2020-10-20 RX ORDER — ATROPINE SULFATE 0.4 MG/ML
.2-2 AMPUL (ML) INJECTION
Status: DISCONTINUED | OUTPATIENT
Start: 2020-10-20 | End: 2020-10-20

## 2020-10-20 RX ORDER — SODIUM CHLORIDE 9 MG/ML
INJECTION, SOLUTION INTRAVENOUS CONTINUOUS
Status: DISCONTINUED | OUTPATIENT
Start: 2020-10-20 | End: 2020-10-20

## 2020-10-20 RX ORDER — DOBUTAMINE HYDROCHLORIDE 200 MG/100ML
10-50 INJECTION INTRAVENOUS CONTINUOUS
Status: ACTIVE | OUTPATIENT
Start: 2020-10-20 | End: 2020-10-20

## 2020-10-20 RX ORDER — METOPROLOL TARTRATE 1 MG/ML
1-20 INJECTION, SOLUTION INTRAVENOUS
Status: DISCONTINUED | OUTPATIENT
Start: 2020-10-20 | End: 2020-10-20

## 2020-10-20 RX ADMIN — ATROPINE SULFATE 0.6 MG: 0.4 INJECTION, SOLUTION INTRAMUSCULAR; INTRAVENOUS; SUBCUTANEOUS at 12:02

## 2020-10-20 RX ADMIN — DOBUTAMINE HYDROCHLORIDE 10 MCG/KG/MIN: 200 INJECTION INTRAVENOUS at 11:54

## 2020-10-20 RX ADMIN — HUMAN ALBUMIN MICROSPHERES AND PERFLUTREN 8 ML: 10; .22 INJECTION, SOLUTION INTRAVENOUS at 12:09

## 2020-10-20 RX ADMIN — METOPROLOL TARTRATE 4 MG: 5 INJECTION INTRAVENOUS at 12:08

## 2020-10-20 NOTE — COMMITTEE REVIEW
Abdominal Committee Review Note     Evaluation Date: 10/18/2020  Committee Review Date: 10/20/2020    Organ being evaluated for: Liver    Transplant Phase: Evaluation  Transplant Status: Active    Transplant Coordinator: Daja Suarez  Transplant Surgeon:   Mil Tejada    Referring Physician: Rivera Dowling    Primary Diagnosis: Cirrhosis: Fatty Liver (Du)  Secondary Diagnosis:     Committee Review Members:  Hepatology Lacy Burnette MD   Nurse Practitioner Edwina Logan, NP   Nutrition Kelin Mcginnis, RD   Pharmacy Efrain Licona, [facility]    - Clinical Dat Iglesias, RENARD, Juanita Wells, [facility]   Transplant Hazel Jean, RN, Kayla Paniagua MD, Shubham Pedersen MD, Raphael Cook MD, Jr Yves Briseno, AJAY, Che Burgess MD, Shelby Vicente, APRN CNP, Daja Suarez, RN, Raymundo Gutierrez MD, Yoandy Sadler MD, Pelon Scott MD, Alexei Alcaraz MD, Thomas M. Leventhal, MD, Mil Tejada MD, Bernice Dunham MD, MD, Himanshu Farias MD       Transplant Eligibility: Cirrhosis with MELD, DU    Committee Review Decision: Approved    Relative Contraindications: None    Absolute Contraindications: None    Committee Chair Raphael Cook MD verbally attested to the committee's decision.    Committee Discussion Details:   Approved to list pending completion of evaluation  Live donor good option   CTA   If possible obtain [facility] op note and review with txp surgeon  No cardiology consult needed, stress okay  Reviewed mild ascending aorta, no need to follow up  Check A1Chgb  Ensure full evaluation to rule out other etiologies for cirrhosis

## 2020-10-20 NOTE — PROGRESS NOTES
Pt here for dobutamine stress test.  Test, meds and side effects reviewed with patient.  Achieved target HR at 30 mcg Dobutamine and a total of 0.6 mg IV atropine.  Gave a total of 4 mg IV Metoprolol to bring HR back to baseline.  Post monitoring complete and VSS.  Pt escorted out to the gold waiting room.

## 2020-10-20 NOTE — LETTER
10/20/2020         RE: Susan Puckett  [address] [place] [address]        Dear Colleague,    Thank you for referring your patient, Susan Puckett, to the [facility]. Please see a copy of my visit note below.    Assessment and Plan:  1. liver transplant evaluation - patient is a excellent candidate overall. Benefits and surgical risks of a liver transplantation were discussed. She would benefit from live donor graft.  2.  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 Major complaint at present is Fatigue: may or may not be related to liver disease  4. Loss of appetite  5. Sp GIB, 2006   Weight start 250, current 126  6. Hx hypothyroid  7. Type 2 DM  I spent over 45 min, over half in counseling with her today.    Surgical evaluation:  1. Portal Vein:Patent  2. Hepatic Artery: Open  3. TIPS: absent  4. Previous Abdominal Surgery: Yes  5. Hepatocellular Carcinoma: None  6. Ascites: Present - minimal  7. Costal Angle: narrow  8. Portopulmonary Hypertension: absent  9. Hepatopulmonary Syndrome: absent  10. Cardiac Evaluation: needs echocardiogram  11. Nutritional Status: Poor  12. Diabetes: drug controlled  13.Hypertension no  14. Smoker:no  14: Fraility index: see dietician note  15. Meets guidelines to receive Living Donor  Yes   16. Potential Living donors Yes -     Recommendations: no surgical contraindications to LT.  Would benefit from prehab as she is quite debilitated.  She no longer works and appetite is poor.  She should have CT imaging with contrast to assess vasculature to liver.      Patients overall evaluation will be discussed at the Liver Transplant selection committee meeting with a final recommendation on the patients suitability for transplant to be made at that time.    Consult Full  Details:  Susan Puckett was seen in consultation for evaluation as a potential liver transplant recipient.    Reason for Visit:  Susan Puckett is a 50 year old  year old female with nonalcoholic steatopheatitis, who presents for liver transplant evaluation.    HPI:  Presenting complaint: easy fatiguablity and loss of muscle mass    Past Medical History:   Diagnosis Date     Depressive disorder      Diabetes (H)     Type 2 DM/No Insulin      History of blood transfusion     10/2019     Liver cirrhosis secondary to CUETO (H) 5/28/2020     Thyroid disease      Past Surgical History:   Procedure Laterality Date     ABDOMEN SURGERY      Gastric Bypass 2006     APPENDECTOMY      1989     COLONOSCOPY      1/2020 at [facility]      ENT SURGERY       GI SURGERY      EGD August 2020 at [facility] SURGERY      2010     VASCULAR SURGERY       Past Surgical History:   Procedure Laterality Date     ABDOMEN SURGERY      Gastric Bypass 2006     APPENDECTOMY      1989     COLONOSCOPY      1/2020 at [facility]      ENT SURGERY       GI SURGERY      EGD August 2020 at [facility] SURGERY      2010     VASCULAR SURGERY       No family history on file.  Allergies   Allergen Reactions     Prednisone      Nsaids Other (See Comments)     Prior to Admission medications    Medication Sig Start Date End Date Taking? Authorizing Provider   atorvastatin (LIPITOR) 20 MG tablet Take 20 mg by mouth 1/9/20  Yes Reported, Patient   blood glucose monitoring (ACCU-CHEK AUDREY PLUS) meter device kit Use  to test daily. Use as directed. Pharmacy dispense brand based on insurance. 1/11/19  Yes Reported, Patient   calcium carbonate (TUMS) 500 MG chewable tablet Take 1 tablet by mouth   Yes Reported, Patient   Calcium Citrate 200 MG TABS Take 950 mg by mouth   Yes Reported, Patient   cholecalciferol (VITAMIN D-1000 MAX ST) 25 MCG (1000 UT) TABS Take 4,000 Units by mouth 12/5/19  Yes Reported, Patient   cyanocobalamin (VITAMIN B-12) 1000 MCG tablet Take 1,000 mcg by mouth 9/2/20  Yes Reported, Patient   Ferrous Sulfate (IRON) 325 (65 FE) MG tablet Take 1 tablet by mouth daily   Yes Reported,  Patient   folic acid (FOLVITE) 1 MG tablet Take 1 mg by mouth 2/26/20  Yes Reported, Patient   furosemide (LASIX) 20 MG tablet Take 20 mg by mouth daily 6/29/20  Yes Reported, Patient   hydrOXYzine (ATARAX) 25 MG tablet Take 25 mg by mouth 4/20/20  Yes Reported, Patient   lactulose (CHRONULAC) 10 GM/15ML solution Take 20 g by mouth 8/19/20  Yes Reported, Patient   Lancets MISC 1 each 1/11/19  Yes Reported, Patient   levothyroxine (SYNTHROID) 175 MCG tablet Take 175 mcg by mouth daily   Yes Reported, Patient   metFORMIN (GLUCOPHAGE) 1000 MG tablet Take 1,000 mg by mouth 3/7/20 9/10/21 Yes Reported, Patient   multivitamin (DEKAS ESSENTIAL) capsule Take 1 capsule by mouth   Yes Reported, Patient   omeprazole (PRILOSEC) 20 MG DR capsule Take 20 mg by mouth 10/16/19  Yes Reported, Patient   ondansetron (ZOFRAN-ODT) 4 MG ODT tab Place 4 mg under the tongue 10/17/19  Yes Reported, Patient   rifaximin (XIFAXAN) 550 MG TABS tablet Take 550 mg by mouth 8/25/20  Yes Reported, Patient   spironolactone (ALDACTONE) 100 MG tablet Take 200 mg by mouth 8/1/20  Yes Reported, Patient   SUMAtriptan (IMITREX) 50 MG tablet Take 50 mg by mouth 8/28/20  Yes Reported, Patient   traZODone (DESYREL) 100 MG tablet Take  mg by mouth 8/27/20  Yes Reported, Patient   valACYclovir (VALTREX) 1000 mg tablet  3/25/20  Yes Reported, Patient   vitamin A 3 MG (22280 UNITS) capsule Take 10,000 Units by mouth 12/5/19  Yes Reported, Patient   vitamin C (ASCORBIC ACID) 100 MG tablet Take 100 mg by mouth   Yes Reported, Patient   vitamin E (TOCOPHEROL) 400 units (180 mg) capsule Take 400 Units by mouth 9/2/20  Yes Reported, Patient   Vitamin K, Phytonadione, 100 MCG TABS Take 100 mcg by mouth 12/5/19  Yes Reported, Patient   acetaminophen-codeine 300-30 MG per tablet Take 1-2 tablets by mouth every 6 hours as needed for mild pain 7/24/14   Jovan Hercules MD   ORDER FOR DME Equipment being ordered: left ankle walking boot 7/24/14   Jovan Hercules MD        Previous Transplant Hx: No    Cardiovascular Hx:       h/o Cardiac Issues: No       Exercise Tolerance: no chest pain or shortness of breath with exertion.    Potential Donor(s): possible    ROS:    REVIEW OF SYSTEMS (check box if normal)  [x]                GENERAL  [x]                  PULMONARY [x]                 GENITOURINARY  [x]                 CNS                 [x]                  CARDIAC  [x]                  ENDOCRINE  [x]                 EARS,NOSE,THROAT [x]                  GASTROINTESTINAL [x]                  NEUROLOGIC    [x]                 MUSCLOSKELTAL  [x]                   HEMATOLOGY    Examination:     Vitals:  /87   Pulse 94   Wt 57.5 kg (126 lb 11.2 oz)   SpO2 99%   Breastfeeding No   BMI 20.76 kg/m      GENERAL APPEARANCE: alert and no distress  EYES: PERRL  HENT: mouth without ulcers or lesions  NECK: supple, no adenopathy  RESP: lungs clear to auscultation - no rales, rhonchi or wheezes  CV: regular rhythm, normal rate, no rub   ABDOMEN:  soft, nontender, no HSM or masses and bowel sounds normal old trochar sites from GIB and endometriosis surgery  MS: extremities normal- no gross deformities noted, no evidence of inflammation in joints, no muscle tenderness  SKIN: no rash  NEURO: Normal strength and tone, sensory exam grossly normal, mentation intact and speech normal  PSYCH: mentation appears normal. and affect worried      Results:   Recent Results (from the past 168 hour(s))   Treponema Abs w Reflex to RPR and Titer    Collection Time: 10/20/20  7:32 AM   Result Value Ref Range    Treponema Antibodies Nonreactive NR^Nonreactive   HIV Antigen Antibody Combo Pretransplant    Collection Time: 10/20/20  7:32 AM   Result Value Ref Range    HIV Antigen Antibody Combo Pretransplant Nonreactive NR^Nonreactive   Hepatitis B surface antigen    Collection Time: 10/20/20  7:32 AM   Result Value Ref Range    Hep B Surface Agn Nonreactive NR^Nonreactive   Hepatitis B Surface  Antibody    Collection Time: 10/20/20  7:32 AM   Result Value Ref Range    Hepatitis B Surface Antibody 3.84 <8.00 m[IU]/mL   CBC with platelets    Collection Time: 10/20/20  7:32 AM   Result Value Ref Range    WBC 5.8 4.0 - 11.0 10e9/L    RBC Count 3.61 (L) 3.8 - 5.2 10e12/L    Hemoglobin 12.4 11.7 - 15.7 g/dL    Hematocrit 36.3 35.0 - 47.0 %     (H) 78 - 100 fl    MCH 34.3 (H) 26.5 - 33.0 pg    MCHC 34.2 31.5 - 36.5 g/dL    RDW 13.9 10.0 - 15.0 %    Platelet Count 139 (L) 150 - 450 10e9/L   Vitamin D Deficiency    Collection Time: 10/20/20  7:32 AM   Result Value Ref Range    Vitamin D Deficiency screening 14 (L) 20 - 75 ug/L   Transferrin    Collection Time: 10/20/20  7:32 AM   Result Value Ref Range    Transferrin 175 (L) 210 - 360 mg/dL   HCG qualitative (female only)    Collection Time: 10/20/20  7:32 AM   Result Value Ref Range    HCG Qualitative Serum Negative NEG^Negative   AFP tumor marker    Collection Time: 10/20/20  7:32 AM   Result Value Ref Range    Alpha Fetoprotein <1.5 0 - 8 ug/L   Antibody titer red cell    Collection Time: 10/20/20  7:33 AM   Result Value Ref Range    Antibody Titer Anti B: IgM >256, IgG >256    ABO/Rh type and screen    Collection Time: 10/20/20  7:33 AM   Result Value Ref Range    ABO A     RH(D) Pos     Antibody Screen Neg     Test Valid Only At          [facility]    Specimen Expires 10/23/2020    EKG 12-lead, tracing only [EKG1]    Collection Time: 10/20/20 10:00 AM   Result Value Ref Range    Interpretation ECG Click View Image link to view waveform and result      I had a long discussion with the patient regarding liver transplantation which included but was not limited to  the following points:    1. Liver transplant selection committee process.  2. The federal rules for cadaveric waiting list, the size and blood type matching of the organ. The availability of living-related donor transplantation.  3. The types of  donors: brain death donors, non-heart beating donors, partial liver grafts: splits and living donor grafts  4. Extended criteria  Donors (older age, steasosis) and the increased  risk of primary non-function using the extended criteria donors  5. The CDC high risk donors,  Risk of donor transmitted infections and donor transmitted malignancy  6. The liver transplant operation and the associated risks and technical complications which can include intraoperative death, post operative death,  Primary non-function, bleeding requiring re-operations, arterial and biliary complications, bowel perforations, and intra abdominal abscess. Some of these complicaitons may require a second operation.  7. The postoperative course, the ICU stay and risk of postoperative complications which can include sepsis, MI, stroke, brain injury, pneumonia, pleural effusions, and renal dysfunction.  8. The current 1 year and 5 year graft and patient survivals.  9. The need for life long immunosuppressive therapy and the side effects of these medications, including the possibility of toxicity, opportunistic infections, risk of cancer including lymphoma, and the possibility of rejection even if the patient is taking the medication exactly as prescribed.  10. The need for compliance with medications and follow-up visits in the clinic and thereafter.  11. The patient and family understand these risks and wish to proceed to transplantation       I spent 45+ minutes with the patient and more than 50% of the time was spend in direct face to face counseling.      Again, thank you for allowing me to participate in the care of your patient.        Sincerely,        Mil Tejada MD

## 2020-10-20 NOTE — PROGRESS NOTES
Assessment and Plan:  1. liver transplant evaluation - patient is a excellent candidate overall. Benefits and surgical risks of a liver transplantation were discussed. She would benefit from live donor graft.  2.  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 Major complaint at present is Fatigue: may or may not be related to liver disease  4. Loss of appetite  5. Sp GIB, 2006   Weight start 250, current 126  6. Hx hypothyroid  7. Type 2 DM  I spent over 45 min, over half in counseling with her today.    Surgical evaluation:  1. Portal Vein:Patent  2. Hepatic Artery: Open  3. TIPS: absent  4. Previous Abdominal Surgery: Yes  5. Hepatocellular Carcinoma: None  6. Ascites: Present - minimal  7. Costal Angle: narrow  8. Portopulmonary Hypertension: absent  9. Hepatopulmonary Syndrome: absent  10. Cardiac Evaluation: needs echocardiogram  11. Nutritional Status: Poor  12. Diabetes: drug controlled  13.Hypertension no  14. Smoker:no  14: Fraility index: see dietician note  15. Meets guidelines to receive Living Donor  Yes   16. Potential Living donors Yes -     Recommendations: no surgical contraindications to LT.  Would benefit from prehab as she is quite debilitated.  She no longer works and appetite is poor.  She should have CT imaging with contrast to assess vasculature to liver.      Patients overall evaluation will be discussed at the Liver Transplant selection committee meeting with a final recommendation on the patients suitability for transplant to be made at that time.    Consult Full  Details:  Susan Puckett was seen in consultation for evaluation as a potential liver transplant recipient.    Reason for Visit:  Susan Puckett is a 50 year old year old female with nonalcoholic steatopheatitis, who presents for liver transplant evaluation.    HPI:  Presenting complaint: easy fatiguablity and loss of muscle mass    Past Medical History:   Diagnosis Date     Depressive disorder      Diabetes (H)      Type 2 DM/No Insulin      History of blood transfusion     10/2019     Liver cirrhosis secondary to CUETO (H) 5/28/2020     Thyroid disease      Past Surgical History:   Procedure Laterality Date     ABDOMEN SURGERY      Gastric Bypass 2006     APPENDECTOMY      1989     COLONOSCOPY      1/2020 at [facility]      ENT SURGERY       GI SURGERY      EGD August 2020 at [facility]      GYN SURGERY      2010     VASCULAR SURGERY       Past Surgical History:   Procedure Laterality Date     ABDOMEN SURGERY      Gastric Bypass 2006     APPENDECTOMY      1989     COLONOSCOPY      1/2020 at [facility]      ENT SURGERY       [facility]      [facility]     VASCULAR SURGERY       No family history on file.  Allergies   Allergen Reactions     Prednisone      Nsaids Other (See Comments)     Prior to Admission medications    Medication Sig Start Date End Date Taking? Authorizing Provider   atorvastatin (LIPITOR) 20 MG tablet Take 20 mg by mouth 1/9/20  Yes Reported, Patient   blood glucose monitoring (ACCU-CHEK AUDREY PLUS) meter device kit Use  to test daily. Use as directed. Pharmacy dispense brand based on insurance. 1/11/19  Yes Reported, Patient   calcium carbonate (TUMS) 500 MG chewable tablet Take 1 tablet by mouth   Yes Reported, Patient   Calcium Citrate 200 MG TABS Take 950 mg by mouth   Yes Reported, Patient   cholecalciferol (VITAMIN D-1000 MAX ST) 25 MCG (1000 UT) TABS Take 4,000 Units by mouth 12/5/19  Yes Reported, Patient   cyanocobalamin (VITAMIN B-12) 1000 MCG tablet Take 1,000 mcg by mouth 9/2/20  Yes Reported, Patient   Ferrous Sulfate (IRON) 325 (65 FE) MG tablet Take 1 tablet by mouth daily   Yes Reported, Patient   folic acid (FOLVITE) 1 MG tablet Take 1 mg by mouth 2/26/20  Yes Reported, Patient   furosemide (LASIX) 20 MG tablet Take 20 mg by mouth daily 6/29/20  Yes Reported, Patient   hydrOXYzine (ATARAX) 25 MG tablet Take 25 mg by mouth 4/20/20  Yes  Reported, Patient   lactulose (CHRONULAC) 10 GM/15ML solution Take 20 g by mouth 8/19/20  Yes Reported, Patient   Lancets MISC 1 each 1/11/19  Yes Reported, Patient   levothyroxine (SYNTHROID) 175 MCG tablet Take 175 mcg by mouth daily   Yes Reported, Patient   metFORMIN (GLUCOPHAGE) 1000 MG tablet Take 1,000 mg by mouth 3/7/20 9/10/21 Yes Reported, Patient   multivitamin (DEKAS ESSENTIAL) capsule Take 1 capsule by mouth   Yes Reported, Patient   omeprazole (PRILOSEC) 20 MG DR capsule Take 20 mg by mouth 10/16/19  Yes Reported, Patient   ondansetron (ZOFRAN-ODT) 4 MG ODT tab Place 4 mg under the tongue 10/17/19  Yes Reported, Patient   rifaximin (XIFAXAN) 550 MG TABS tablet Take 550 mg by mouth 8/25/20  Yes Reported, Patient   spironolactone (ALDACTONE) 100 MG tablet Take 200 mg by mouth 8/1/20  Yes Reported, Patient   SUMAtriptan (IMITREX) 50 MG tablet Take 50 mg by mouth 8/28/20  Yes Reported, Patient   traZODone (DESYREL) 100 MG tablet Take  mg by mouth 8/27/20  Yes Reported, Patient   valACYclovir (VALTREX) 1000 mg tablet  3/25/20  Yes Reported, Patient   vitamin A 3 MG (32500 UNITS) capsule Take 10,000 Units by mouth 12/5/19  Yes Reported, Patient   vitamin C (ASCORBIC ACID) 100 MG tablet Take 100 mg by mouth   Yes Reported, Patient   vitamin E (TOCOPHEROL) 400 units (180 mg) capsule Take 400 Units by mouth 9/2/20  Yes Reported, Patient   Vitamin K, Phytonadione, 100 MCG TABS Take 100 mcg by mouth 12/5/19  Yes Reported, Patient   acetaminophen-codeine 300-30 MG per tablet Take 1-2 tablets by mouth every 6 hours as needed for mild pain 7/24/14   Jovan Hercules MD   ORDER FOR DME Equipment being ordered: left ankle walking boot 7/24/14   Jovan Hercules MD       Previous Transplant Hx: No    Cardiovascular Hx:       h/o Cardiac Issues: No       Exercise Tolerance: no chest pain or shortness of breath with exertion.    Potential Donor(s): possible    ROS:    REVIEW OF SYSTEMS (check box if normal)  [x]                 GENERAL  [x]                  PULMONARY [x]                 GENITOURINARY  [x]                 CNS                 [x]                  CARDIAC  [x]                  ENDOCRINE  [x]                 EARS,NOSE,THROAT [x]                  GASTROINTESTINAL [x]                  NEUROLOGIC    [x]                 MUSCLOSKELTAL  [x]                   HEMATOLOGY    Examination:     Vitals:  /87   Pulse 94   Wt 57.5 kg (126 lb 11.2 oz)   SpO2 99%   Breastfeeding No   BMI 20.76 kg/m      GENERAL APPEARANCE: alert and no distress  EYES: PERRL  HENT: mouth without ulcers or lesions  NECK: supple, no adenopathy  RESP: lungs clear to auscultation - no rales, rhonchi or wheezes  CV: regular rhythm, normal rate, no rub   ABDOMEN:  soft, nontender, no HSM or masses and bowel sounds normal old trochar sites from GIB and endometriosis surgery  MS: extremities normal- no gross deformities noted, no evidence of inflammation in joints, no muscle tenderness  SKIN: no rash  NEURO: Normal strength and tone, sensory exam grossly normal, mentation intact and speech normal  PSYCH: mentation appears normal. and affect worried      Results:   Recent Results (from the past 168 hour(s))   Treponema Abs w Reflex to RPR and Titer    Collection Time: 10/20/20  7:32 AM   Result Value Ref Range    Treponema Antibodies Nonreactive NR^Nonreactive   HIV Antigen Antibody Combo Pretransplant    Collection Time: 10/20/20  7:32 AM   Result Value Ref Range    HIV Antigen Antibody Combo Pretransplant Nonreactive NR^Nonreactive   Hepatitis B surface antigen    Collection Time: 10/20/20  7:32 AM   Result Value Ref Range    Hep B Surface Agn Nonreactive NR^Nonreactive   Hepatitis B Surface Antibody    Collection Time: 10/20/20  7:32 AM   Result Value Ref Range    Hepatitis B Surface Antibody 3.84 <8.00 m[IU]/mL   CBC with platelets    Collection Time: 10/20/20  7:32 AM   Result Value Ref Range    WBC 5.8 4.0 - 11.0 10e9/L    RBC Count 3.61 (L)  3.8 - 5.2 10e12/L    Hemoglobin 12.4 11.7 - 15.7 g/dL    Hematocrit 36.3 35.0 - 47.0 %     (H) 78 - 100 fl    MCH 34.3 (H) 26.5 - 33.0 pg    MCHC 34.2 31.5 - 36.5 g/dL    RDW 13.9 10.0 - 15.0 %    Platelet Count 139 (L) 150 - 450 10e9/L   Vitamin D Deficiency    Collection Time: 10/20/20  7:32 AM   Result Value Ref Range    Vitamin D Deficiency screening 14 (L) 20 - 75 ug/L   Transferrin    Collection Time: 10/20/20  7:32 AM   Result Value Ref Range    Transferrin 175 (L) 210 - 360 mg/dL   HCG qualitative (female only)    Collection Time: 10/20/20  7:32 AM   Result Value Ref Range    HCG Qualitative Serum Negative NEG^Negative   AFP tumor marker    Collection Time: 10/20/20  7:32 AM   Result Value Ref Range    Alpha Fetoprotein <1.5 0 - 8 ug/L   Antibody titer red cell    Collection Time: 10/20/20  7:33 AM   Result Value Ref Range    Antibody Titer Anti B: IgM >256, IgG >256    ABO/Rh type and screen    Collection Time: 10/20/20  7:33 AM   Result Value Ref Range    ABO A     RH(D) Pos     Antibody Screen Neg     Test Valid Only At          [facility]    Specimen Expires 10/23/2020    EKG 12-lead, tracing only [EKG1]    Collection Time: 10/20/20 10:00 AM   Result Value Ref Range    Interpretation ECG Click View Image link to view waveform and result      I had a long discussion with the patient regarding liver transplantation which included but was not limited to  the following points:    1. Liver transplant selection committee process.  2. The federal rules for cadaveric waiting list, the size and blood type matching of the organ. The availability of living-related donor transplantation.  3. The types of donors: brain death donors, non-heart beating donors, partial liver grafts: splits and living donor grafts  4. Extended criteria  Donors (older age, steasosis) and the increased  risk of primary non-function using the extended criteria donors  5. The CDC high risk  donors,  Risk of donor transmitted infections and donor transmitted malignancy  6. The liver transplant operation and the associated risks and technical complications which can include intraoperative death, post operative death,  Primary non-function, bleeding requiring re-operations, arterial and biliary complications, bowel perforations, and intra abdominal abscess. Some of these complicaitons may require a second operation.  7. The postoperative course, the ICU stay and risk of postoperative complications which can include sepsis, MI, stroke, brain injury, pneumonia, pleural effusions, and renal dysfunction.  8. The current 1 year and 5 year graft and patient survivals.  9. The need for life long immunosuppressive therapy and the side effects of these medications, including the possibility of toxicity, opportunistic infections, risk of cancer including lymphoma, and the possibility of rejection even if the patient is taking the medication exactly as prescribed.  10. The need for compliance with medications and follow-up visits in the clinic and thereafter.  11. The patient and family understand these risks and wish to proceed to transplantation       I spent 45+ minutes with the patient and more than 50% of the time was spend in direct face to face counseling.

## 2020-10-21 RX ORDER — ERGOCALCIFEROL 1.25 MG/1
50000 CAPSULE, LIQUID FILLED ORAL WEEKLY
Qty: 10 CAPSULE | Refills: 0 | Status: SHIPPED | OUTPATIENT
Start: 2020-10-21 | End: 2020-12-24

## 2020-10-21 NOTE — TELEPHONE ENCOUNTER
Susan had RNY gastric bypass at [facility] in 1/2006. She will get labs same day at imaging. She does have an endocrinologist and agrees to follow up on regarding thyroid management and elevated blood sugars. We discussed bone health and will send Rx for Calcium and Vit D weekly for 10 weeks to her pharmacy. Once done with Vrt D rx she should go back to daily OTC does. Per teach back has clear understanding/.

## 2020-10-22 LAB
GAMMA INTERFERON BACKGROUND BLD IA-ACNC: 0.09 IU/ML
INTERPRETATION ECG - MUSE: NORMAL
M TB IFN-G CD4+ BCKGRND COR BLD-ACNC: 4.69 IU/ML
M TB TUBERC IFN-G BLD QL: NEGATIVE
MITOGEN IGNF BCKGRD COR BLD-ACNC: 0 IU/ML
MITOGEN IGNF BCKGRD COR BLD-ACNC: 0 IU/ML

## 2020-10-24 ENCOUNTER — TELEPHONE (OUTPATIENT)
Dept: TRANSPLANT | Facility: CLINIC | Age: 50
End: 2020-10-24

## 2020-10-24 LAB — PETH BLD-MCNC: NEGATIVE NG/ML

## 2020-10-24 PROCEDURE — 99285 EMERGENCY DEPT VISIT HI MDM: CPT | Mod: 25 | Performed by: EMERGENCY MEDICINE

## 2020-10-24 PROCEDURE — 99285 EMERGENCY DEPT VISIT HI MDM: CPT | Performed by: EMERGENCY MEDICINE

## 2020-10-24 NOTE — TELEPHONE ENCOUNTER
Susan, called to report that she is very shaky and weak.pt is having having 3 stools a day but has not had any today. She is not confused. She will take her dose of lactulose and if no results and shaking remains will go to the ER. Susan agreeable with plan and will call back with any changes.

## 2020-10-25 ENCOUNTER — HOSPITAL ENCOUNTER (INPATIENT)
Facility: CLINIC | Age: 50
LOS: 1 days | Discharge: HOME | DRG: 442 | End: 2020-10-26
Attending: EMERGENCY MEDICINE | Admitting: HOSPITALIST
Payer: COMMERCIAL

## 2020-10-25 ENCOUNTER — APPOINTMENT (OUTPATIENT)
Dept: ULTRASOUND IMAGING | Facility: CLINIC | Age: 50
DRG: 442 | End: 2020-10-25
Payer: COMMERCIAL

## 2020-10-25 DIAGNOSIS — K75.81 NASH (NONALCOHOLIC STEATOHEPATITIS): ICD-10-CM

## 2020-10-25 DIAGNOSIS — Z20.828 CONTACT WITH AND (SUSPECTED) EXPOSURE TO OTHER VIRAL COMMUNICABLE DISEASES: ICD-10-CM

## 2020-10-25 DIAGNOSIS — K76.82 HEPATIC ENCEPHALOPATHY (H): ICD-10-CM

## 2020-10-25 DIAGNOSIS — N30.00 ACUTE CYSTITIS WITHOUT HEMATURIA: Primary | ICD-10-CM

## 2020-10-25 LAB
ALBUMIN SERPL-MCNC: 2.9 G/DL (ref 3.4–5)
ALBUMIN UR-MCNC: NEGATIVE MG/DL
ALP SERPL-CCNC: 105 U/L (ref 40–150)
ALT SERPL W P-5'-P-CCNC: 50 U/L (ref 0–50)
AMMONIA PLAS-SCNC: 109 UMOL/L (ref 10–50)
ANION GAP SERPL CALCULATED.3IONS-SCNC: 11 MMOL/L (ref 3–14)
APPEARANCE UR: CLEAR
AST SERPL W P-5'-P-CCNC: 74 U/L (ref 0–45)
BACTERIA #/AREA URNS HPF: ABNORMAL /HPF
BASOPHILS # BLD AUTO: 0 10E9/L (ref 0–0.2)
BASOPHILS NFR BLD AUTO: 0.3 %
BILIRUB SERPL-MCNC: 0.9 MG/DL (ref 0.2–1.3)
BILIRUB UR QL STRIP: NEGATIVE
BUN SERPL-MCNC: 25 MG/DL (ref 7–30)
CALCIUM SERPL-MCNC: 8.1 MG/DL (ref 8.5–10.1)
CHLORIDE SERPL-SCNC: 96 MMOL/L (ref 94–109)
CK SERPL-CCNC: 46 U/L (ref 30–225)
CO2 SERPL-SCNC: 27 MMOL/L (ref 20–32)
COLOR UR AUTO: YELLOW
CREAT SERPL-MCNC: 0.9 MG/DL (ref 0.52–1.04)
DIFFERENTIAL METHOD BLD: ABNORMAL
EOSINOPHIL # BLD AUTO: 0.2 10E9/L (ref 0–0.7)
EOSINOPHIL NFR BLD AUTO: 2.6 %
ERYTHROCYTE [DISTWIDTH] IN BLOOD BY AUTOMATED COUNT: 14.4 % (ref 10–15)
GFR SERPL CREATININE-BSD FRML MDRD: 75 ML/MIN/{1.73_M2}
GLUCOSE BLDC GLUCOMTR-MCNC: 118 MG/DL (ref 70–99)
GLUCOSE BLDC GLUCOMTR-MCNC: 135 MG/DL (ref 70–99)
GLUCOSE BLDC GLUCOMTR-MCNC: 161 MG/DL (ref 70–99)
GLUCOSE BLDC GLUCOMTR-MCNC: 178 MG/DL (ref 70–99)
GLUCOSE SERPL-MCNC: 129 MG/DL (ref 70–99)
GLUCOSE UR STRIP-MCNC: NEGATIVE MG/DL
HCG UR QL: NEGATIVE
HCT VFR BLD AUTO: 33.8 % (ref 35–47)
HGB BLD-MCNC: 11.2 G/DL (ref 11.7–15.7)
HGB UR QL STRIP: NEGATIVE
HYALINE CASTS #/AREA URNS LPF: 5 /LPF (ref 0–2)
IMM GRANULOCYTES # BLD: 0 10E9/L (ref 0–0.4)
IMM GRANULOCYTES NFR BLD: 0.3 %
KETONES UR STRIP-MCNC: NEGATIVE MG/DL
LEUKOCYTE ESTERASE UR QL STRIP: ABNORMAL
LYMPHOCYTES # BLD AUTO: 2.1 10E9/L (ref 0.8–5.3)
LYMPHOCYTES NFR BLD AUTO: 33.8 %
MAGNESIUM SERPL-MCNC: 1.6 MG/DL (ref 1.6–2.3)
MCH RBC QN AUTO: 33.7 PG (ref 26.5–33)
MCHC RBC AUTO-ENTMCNC: 33.1 G/DL (ref 31.5–36.5)
MCV RBC AUTO: 102 FL (ref 78–100)
MONOCYTES # BLD AUTO: 0.5 10E9/L (ref 0–1.3)
MONOCYTES NFR BLD AUTO: 7.9 %
MUCOUS THREADS #/AREA URNS LPF: PRESENT /LPF
NEUTROPHILS # BLD AUTO: 3.4 10E9/L (ref 1.6–8.3)
NEUTROPHILS NFR BLD AUTO: 55.1 %
NITRATE UR QL: POSITIVE
NRBC # BLD AUTO: 0 10*3/UL
NRBC BLD AUTO-RTO: 0 /100
PH UR STRIP: 5.5 PH (ref 5–7)
PLATELET # BLD AUTO: 135 10E9/L (ref 150–450)
POTASSIUM SERPL-SCNC: 3.8 MMOL/L (ref 3.4–5.3)
PROT SERPL-MCNC: 7.3 G/DL (ref 6.8–8.8)
RBC # BLD AUTO: 3.32 10E12/L (ref 3.8–5.2)
RBC #/AREA URNS AUTO: 1 /HPF (ref 0–2)
SODIUM SERPL-SCNC: 133 MMOL/L (ref 133–144)
SOURCE: ABNORMAL
SP GR UR STRIP: 1.01 (ref 1–1.03)
SQUAMOUS #/AREA URNS AUTO: 1 /HPF (ref 0–1)
TRANS CELLS #/AREA URNS HPF: <1 /HPF (ref 0–1)
UROBILINOGEN UR STRIP-MCNC: NORMAL MG/DL (ref 0–2)
WBC # BLD AUTO: 6.2 10E9/L (ref 4–11)
WBC #/AREA URNS AUTO: 28 /HPF (ref 0–5)

## 2020-10-25 PROCEDURE — 81025 URINE PREGNANCY TEST: CPT | Performed by: EMERGENCY MEDICINE

## 2020-10-25 PROCEDURE — 81001 URINALYSIS AUTO W/SCOPE: CPT | Performed by: EMERGENCY MEDICINE

## 2020-10-25 PROCEDURE — 96361 HYDRATE IV INFUSION ADD-ON: CPT | Performed by: EMERGENCY MEDICINE

## 2020-10-25 PROCEDURE — 250N000013 HC RX MED GY IP 250 OP 250 PS 637: Performed by: INTERNAL MEDICINE

## 2020-10-25 PROCEDURE — 87086 URINE CULTURE/COLONY COUNT: CPT | Performed by: HOSPITALIST

## 2020-10-25 PROCEDURE — 85025 COMPLETE CBC W/AUTO DIFF WBC: CPT | Performed by: EMERGENCY MEDICINE

## 2020-10-25 PROCEDURE — 82550 ASSAY OF CK (CPK): CPT | Performed by: EMERGENCY MEDICINE

## 2020-10-25 PROCEDURE — 120N000002 HC R&B MED SURG/OB UMMC

## 2020-10-25 PROCEDURE — 83735 ASSAY OF MAGNESIUM: CPT | Performed by: EMERGENCY MEDICINE

## 2020-10-25 PROCEDURE — 96360 HYDRATION IV INFUSION INIT: CPT | Performed by: EMERGENCY MEDICINE

## 2020-10-25 PROCEDURE — U0003 INFECTIOUS AGENT DETECTION BY NUCLEIC ACID (DNA OR RNA); SEVERE ACUTE RESPIRATORY SYNDROME CORONAVIRUS 2 (SARS-COV-2) (CORONAVIRUS DISEASE [COVID-19]), AMPLIFIED PROBE TECHNIQUE, MAKING USE OF HIGH THROUGHPUT TECHNOLOGIES AS DESCRIBED BY CMS-2020-01-R: HCPCS | Performed by: STUDENT IN AN ORGANIZED HEALTH CARE EDUCATION/TRAINING PROGRAM

## 2020-10-25 PROCEDURE — 258N000003 HC RX IP 258 OP 636: Performed by: EMERGENCY MEDICINE

## 2020-10-25 PROCEDURE — 82140 ASSAY OF AMMONIA: CPT | Performed by: EMERGENCY MEDICINE

## 2020-10-25 PROCEDURE — 250N000013 HC RX MED GY IP 250 OP 250 PS 637: Performed by: EMERGENCY MEDICINE

## 2020-10-25 PROCEDURE — 250N000012 HC RX MED GY IP 250 OP 636 PS 637: Performed by: STUDENT IN AN ORGANIZED HEALTH CARE EDUCATION/TRAINING PROGRAM

## 2020-10-25 PROCEDURE — 250N000011 HC RX IP 250 OP 636: Performed by: STUDENT IN AN ORGANIZED HEALTH CARE EDUCATION/TRAINING PROGRAM

## 2020-10-25 PROCEDURE — 76700 US EXAM ABDOM COMPLETE: CPT | Mod: 26 | Performed by: RADIOLOGY

## 2020-10-25 PROCEDURE — 80053 COMPREHEN METABOLIC PANEL: CPT | Performed by: EMERGENCY MEDICINE

## 2020-10-25 PROCEDURE — 76700 US EXAM ABDOM COMPLETE: CPT

## 2020-10-25 PROCEDURE — 87088 URINE BACTERIA CULTURE: CPT | Performed by: HOSPITALIST

## 2020-10-25 PROCEDURE — 250N000013 HC RX MED GY IP 250 OP 250 PS 637: Performed by: STUDENT IN AN ORGANIZED HEALTH CARE EDUCATION/TRAINING PROGRAM

## 2020-10-25 PROCEDURE — 87186 SC STD MICRODIL/AGAR DIL: CPT | Performed by: HOSPITALIST

## 2020-10-25 PROCEDURE — 99223 1ST HOSP IP/OBS HIGH 75: CPT | Mod: AI | Performed by: INTERNAL MEDICINE

## 2020-10-25 PROCEDURE — 999N001017 HC STATISTIC GLUCOSE BY METER IP

## 2020-10-25 PROCEDURE — C9803 HOPD COVID-19 SPEC COLLECT: HCPCS | Performed by: EMERGENCY MEDICINE

## 2020-10-25 RX ORDER — NICOTINE POLACRILEX 4 MG
15-30 LOZENGE BUCCAL
Status: DISCONTINUED | OUTPATIENT
Start: 2020-10-25 | End: 2020-10-26 | Stop reason: HOSPADM

## 2020-10-25 RX ORDER — METOCLOPRAMIDE 5 MG/1
10 TABLET ORAL EVERY 6 HOURS PRN
Status: DISCONTINUED | OUTPATIENT
Start: 2020-10-25 | End: 2020-10-26 | Stop reason: HOSPADM

## 2020-10-25 RX ORDER — METOCLOPRAMIDE HYDROCHLORIDE 5 MG/ML
10 INJECTION INTRAMUSCULAR; INTRAVENOUS EVERY 6 HOURS PRN
Status: DISCONTINUED | OUTPATIENT
Start: 2020-10-25 | End: 2020-10-26 | Stop reason: HOSPADM

## 2020-10-25 RX ORDER — HYDROXYZINE HYDROCHLORIDE 25 MG/1
25 TABLET, FILM COATED ORAL
Status: DISCONTINUED | OUTPATIENT
Start: 2020-10-25 | End: 2020-10-26 | Stop reason: HOSPADM

## 2020-10-25 RX ORDER — ACETAMINOPHEN 325 MG/1
325-650 TABLET ORAL EVERY 6 HOURS PRN
Status: DISCONTINUED | OUTPATIENT
Start: 2020-10-25 | End: 2020-10-26 | Stop reason: HOSPADM

## 2020-10-25 RX ORDER — LACTULOSE 10 G/15ML
20 SOLUTION ORAL ONCE
Status: COMPLETED | OUTPATIENT
Start: 2020-10-25 | End: 2020-10-25

## 2020-10-25 RX ORDER — DEXTROSE MONOHYDRATE 25 G/50ML
25-50 INJECTION, SOLUTION INTRAVENOUS
Status: DISCONTINUED | OUTPATIENT
Start: 2020-10-25 | End: 2020-10-26 | Stop reason: HOSPADM

## 2020-10-25 RX ORDER — SPIRONOLACTONE 50 MG/1
200 TABLET, FILM COATED ORAL DAILY
Status: DISCONTINUED | OUTPATIENT
Start: 2020-10-25 | End: 2020-10-26 | Stop reason: HOSPADM

## 2020-10-25 RX ORDER — ONDANSETRON 4 MG/1
4 TABLET, ORALLY DISINTEGRATING ORAL EVERY 6 HOURS PRN
Status: DISCONTINUED | OUTPATIENT
Start: 2020-10-25 | End: 2020-10-26 | Stop reason: HOSPADM

## 2020-10-25 RX ORDER — AMOXICILLIN 250 MG
1 CAPSULE ORAL 2 TIMES DAILY PRN
Status: DISCONTINUED | OUTPATIENT
Start: 2020-10-25 | End: 2020-10-26 | Stop reason: HOSPADM

## 2020-10-25 RX ORDER — AMOXICILLIN 250 MG
2 CAPSULE ORAL 2 TIMES DAILY PRN
Status: DISCONTINUED | OUTPATIENT
Start: 2020-10-25 | End: 2020-10-26 | Stop reason: HOSPADM

## 2020-10-25 RX ORDER — LEVOTHYROXINE SODIUM 100 UG/1
200 TABLET ORAL EVERY OTHER DAY
Status: DISCONTINUED | OUTPATIENT
Start: 2020-10-26 | End: 2020-10-26 | Stop reason: HOSPADM

## 2020-10-25 RX ORDER — BISACODYL 10 MG
10 SUPPOSITORY, RECTAL RECTAL DAILY PRN
Status: DISCONTINUED | OUTPATIENT
Start: 2020-10-25 | End: 2020-10-26 | Stop reason: HOSPADM

## 2020-10-25 RX ORDER — ATORVASTATIN CALCIUM 20 MG/1
20 TABLET, FILM COATED ORAL DAILY
Status: DISCONTINUED | OUTPATIENT
Start: 2020-10-25 | End: 2020-10-26 | Stop reason: HOSPADM

## 2020-10-25 RX ORDER — LEVOTHYROXINE SODIUM 200 UG/1
200 TABLET ORAL
Status: ON HOLD | COMMUNITY
End: 2020-10-26

## 2020-10-25 RX ORDER — FERROUS SULFATE 325(65) MG
325 TABLET ORAL DAILY
Status: DISCONTINUED | OUTPATIENT
Start: 2020-10-25 | End: 2020-10-26 | Stop reason: HOSPADM

## 2020-10-25 RX ORDER — ONDANSETRON 2 MG/ML
4 INJECTION INTRAMUSCULAR; INTRAVENOUS EVERY 6 HOURS PRN
Status: DISCONTINUED | OUTPATIENT
Start: 2020-10-25 | End: 2020-10-26 | Stop reason: HOSPADM

## 2020-10-25 RX ORDER — ACETAMINOPHEN 325 MG/1
325-650 TABLET ORAL EVERY 6 HOURS PRN
Status: ON HOLD | COMMUNITY
End: 2020-10-26

## 2020-10-25 RX ORDER — NITROFURANTOIN 25; 75 MG/1; MG/1
100 CAPSULE ORAL EVERY 12 HOURS SCHEDULED
Status: DISCONTINUED | OUTPATIENT
Start: 2020-10-25 | End: 2020-10-26 | Stop reason: HOSPADM

## 2020-10-25 RX ORDER — FUROSEMIDE 20 MG
20 TABLET ORAL DAILY
Status: DISCONTINUED | OUTPATIENT
Start: 2020-10-25 | End: 2020-10-26 | Stop reason: HOSPADM

## 2020-10-25 RX ORDER — FLUORIDE TOOTHPASTE
10 TOOTHPASTE DENTAL 4 TIMES DAILY
Status: DISCONTINUED | OUTPATIENT
Start: 2020-10-25 | End: 2020-10-26 | Stop reason: HOSPADM

## 2020-10-25 RX ORDER — PROCHLORPERAZINE 25 MG
25 SUPPOSITORY, RECTAL RECTAL EVERY 12 HOURS PRN
Status: DISCONTINUED | OUTPATIENT
Start: 2020-10-25 | End: 2020-10-26 | Stop reason: HOSPADM

## 2020-10-25 RX ORDER — PHYTONADIONE (VIT K1) 100 MCG
100 TABLET ORAL
Status: DISCONTINUED | OUTPATIENT
Start: 2020-10-25 | End: 2020-10-25

## 2020-10-25 RX ORDER — TRAZODONE HYDROCHLORIDE 50 MG/1
50-100 TABLET, FILM COATED ORAL AT BEDTIME
Status: DISCONTINUED | OUTPATIENT
Start: 2020-10-25 | End: 2020-10-26 | Stop reason: HOSPADM

## 2020-10-25 RX ORDER — SUMATRIPTAN 50 MG/1
50 TABLET, FILM COATED ORAL
Status: COMPLETED | OUTPATIENT
Start: 2020-10-25 | End: 2020-10-25

## 2020-10-25 RX ORDER — POLYETHYLENE GLYCOL 3350 17 G/17G
17 POWDER, FOR SOLUTION ORAL DAILY PRN
Status: DISCONTINUED | OUTPATIENT
Start: 2020-10-25 | End: 2020-10-26 | Stop reason: HOSPADM

## 2020-10-25 RX ORDER — LACTULOSE 10 G/15ML
20 SOLUTION ORAL 3 TIMES DAILY
Status: DISCONTINUED | OUTPATIENT
Start: 2020-10-25 | End: 2020-10-26 | Stop reason: HOSPADM

## 2020-10-25 RX ORDER — PROCHLORPERAZINE MALEATE 5 MG
10 TABLET ORAL EVERY 6 HOURS PRN
Status: DISCONTINUED | OUTPATIENT
Start: 2020-10-25 | End: 2020-10-26 | Stop reason: HOSPADM

## 2020-10-25 RX ORDER — NALOXONE HYDROCHLORIDE 0.4 MG/ML
.1-.4 INJECTION, SOLUTION INTRAMUSCULAR; INTRAVENOUS; SUBCUTANEOUS
Status: DISCONTINUED | OUTPATIENT
Start: 2020-10-25 | End: 2020-10-26 | Stop reason: HOSPADM

## 2020-10-25 RX ORDER — LIDOCAINE 40 MG/G
CREAM TOPICAL
Status: DISCONTINUED | OUTPATIENT
Start: 2020-10-25 | End: 2020-10-26 | Stop reason: HOSPADM

## 2020-10-25 RX ADMIN — Medication 0.1 MG: at 16:34

## 2020-10-25 RX ADMIN — ACETAMINOPHEN 650 MG: 325 TABLET, FILM COATED ORAL at 12:30

## 2020-10-25 RX ADMIN — METFORMIN HYDROCHLORIDE 1000 MG: 500 TABLET ORAL at 07:48

## 2020-10-25 RX ADMIN — SODIUM CHLORIDE 1000 ML: 9 INJECTION, SOLUTION INTRAVENOUS at 03:34

## 2020-10-25 RX ADMIN — LACTULOSE 20 G: 20 SOLUTION ORAL at 07:48

## 2020-10-25 RX ADMIN — HYDROXYZINE HYDROCHLORIDE 25 MG: 25 TABLET, FILM COATED ORAL at 22:20

## 2020-10-25 RX ADMIN — LACTULOSE 20 G: 20 POWDER, FOR SOLUTION ORAL at 16:40

## 2020-10-25 RX ADMIN — Medication 10 ML: at 12:53

## 2020-10-25 RX ADMIN — TRAZODONE HYDROCHLORIDE 100 MG: 50 TABLET ORAL at 22:20

## 2020-10-25 RX ADMIN — INSULIN ASPART 1 UNITS: 100 INJECTION, SOLUTION INTRAVENOUS; SUBCUTANEOUS at 19:24

## 2020-10-25 RX ADMIN — OMEPRAZOLE 20 MG: 20 CAPSULE, DELAYED RELEASE ORAL at 20:20

## 2020-10-25 RX ADMIN — LACTULOSE 20 G: 20 SOLUTION ORAL at 20:19

## 2020-10-25 RX ADMIN — Medication 10 ML: at 16:34

## 2020-10-25 RX ADMIN — SUMATRIPTAN SUCCINATE 50 MG: 50 TABLET ORAL at 22:20

## 2020-10-25 RX ADMIN — SPIRONOLACTONE 200 MG: 50 TABLET, FILM COATED ORAL at 07:48

## 2020-10-25 RX ADMIN — FUROSEMIDE 20 MG: 20 TABLET ORAL at 07:47

## 2020-10-25 RX ADMIN — LACTULOSE 20 G: 20 SOLUTION ORAL at 03:33

## 2020-10-25 RX ADMIN — ONDANSETRON 4 MG: 4 TABLET, ORALLY DISINTEGRATING ORAL at 21:58

## 2020-10-25 RX ADMIN — LEVOTHYROXINE SODIUM 175 MCG: 0.03 TABLET ORAL at 07:47

## 2020-10-25 RX ADMIN — LACTULOSE 20 G: 20 SOLUTION ORAL at 14:07

## 2020-10-25 RX ADMIN — ATORVASTATIN CALCIUM 20 MG: 20 TABLET, FILM COATED ORAL at 07:47

## 2020-10-25 RX ADMIN — FERROUS SULFATE TAB 325 MG (65 MG ELEMENTAL FE) 325 MG: 325 (65 FE) TAB at 07:47

## 2020-10-25 ASSESSMENT — ACTIVITIES OF DAILY LIVING (ADL)
DIFFICULTY_COMMUNICATING: NO
TOILETING_ISSUES: NO
CONCENTRATING,_REMEMBERING_OR_MAKING_DECISIONS_DIFFICULTY: NO
WALKING_OR_CLIMBING_STAIRS_DIFFICULTY: NO
FALL_HISTORY_WITHIN_LAST_SIX_MONTHS: YES
DOING_ERRANDS_INDEPENDENTLY_DIFFICULTY: NO
DIFFICULTY_EATING/SWALLOWING: NO
WEAR_GLASSES_OR_BLIND: YES
ADLS_ACUITY_SCORE: 15
ADLS_ACUITY_SCORE: 15
DRESSING/BATHING_DIFFICULTY: NO
NUMBER_OF_TIMES_PATIENT_HAS_FALLEN_WITHIN_LAST_SIX_MONTHS: 3
WHICH_OF_THE_ABOVE_FUNCTIONAL_RISKS_HAD_A_RECENT_ONSET_OR_CHANGE?: COGNITION
ADLS_ACUITY_SCORE: 15
ADLS_ACUITY_SCORE: 15
VISION_MANAGEMENT: GLASSES

## 2020-10-25 ASSESSMENT — MIFFLIN-ST. JEOR: SCORE: 1220.1

## 2020-10-25 NOTE — PROGRESS NOTES
Admitted/transferred from: ED  2 RN full skin assessment completed by Brittani Joyce, RN and Kamran James RN.  Skin assessment finding: issues found flaky patches of blanchable redness throughout body, otherwise intact   Interventions/actions: skin interventions lotion given to pt, encouraged to reposition frequently     Will continue to monitor.

## 2020-10-25 NOTE — H&P
"[facility]     History and Physical - [facility]        Date of Admission:  10/25/2020    Assessment & Plan   Susan Puckett is a 50 year old female with a history of non-alcoholic steatohepatitis, DMII, hypothyroidism, admitted on 10/25/2020 with myalgias and bilateral upper extremity tremor, found to have an ammonia level of 109.    #CUETO Cirrhosis  #Hyperammonemia  #Tremor/Myalgias  Patient presenting with tremor/myalgias which she says are typical of worsening of her ammonia levels. She also endorses mental \"fogginess\", 2-3 recent falls due to LE weakness. She is otherwise relatively asymptomatic. No history of SBP, white count, ascites on exam, or fevers/chills suggestive of SBP. Alert and oriented, certainly not encephalopathic but perhaps somewhat mentally \"slowed\". VS stable. Ammonia elevated at 109. Overall, low-concern for infection at this time, but could consider further workup pending results of tests below:  - Abdominal ultrasound: Rule out ascites that could be tapped  - Q24H ammonia checks  - Scheduled + PRN Lactulose with goal 3-4 BMs per day  - Resume PTA spironolactone, Lasix  - Resume PTA Vitamin K    #HLD  - Resume PTA atorvastatin    #Hypothyroidism  - Resume PTA levothyroxine 175/200mcg alternating dose per pharmacy    #GERD  - Resume PTA omeprazole    #DMII   on admission  - Resume PTA Metformin 1000mg BID    #Anemia, macrocytic  Takes B12/folate supplements at home. Currently holding but could consider restarting while hospitalized       Diet: Regular Diet Adult    Fluids: PO  DVT Prophylaxis: Pneumatic Compression Devices  Rossi Catheter: not present  Code Status:   FULL (presumed)         Disposition Plan   Expected discharge: 4 - 7 days, recommended to prior living arrangement once adequate pain management/ tolerating PO medications and mental status at baseline.  Entered: Wai Paul MD 10/25/2020, 7:49 AM   " "    Patient to be staffed by the day team    Wai Paul MD  [facility]   Contact information available via [facility] Paging/Directory  Please see sign in/sign out for up to date coverage information  ______________________________________________________________________    Chief Complaint   Myalgias, tremor, mental fogginess    History is obtained from the patient    History of Present Illness   Susan Puckett is a 50 year old female with a history of non-alcoholic steatohepatitis, DMII, hypothyroidism, admitted on 10/25/2020 with myalgias, bilateral upper extremity tremor, and general mental \"fogginess\".    Ms. Puckett notes that her symptoms started ~4 days ago with initial bilateral upper extremity tremor and just \"not feeling great\". Her symptoms progressed and two days ago she noticed increased \"mental fogginess\". She never had trouble with memory or confusion but felt she was not as quick as she usually is. Yesterday, she noted increased weakness in her legs and fell 2-3 times. These were witnessed by her  who suggested she present to the ED. She says these symptoms are typical for elevation in her ammonia level, though it has not been this bad in a while. She notably endorses mild nausea and occasional \"dry heaves\" but denies fevers/chills, chest pain, shortness of breath, abdominal pain/swelling, urinary frequency, hematuria, numbness/tingling.    Review of Systems    The 10 point Review of Systems is negative other than noted in the HPI    Past Medical History    I have reviewed this patient's medical history and updated it with pertinent information if needed.   Past Medical History:   Diagnosis Date     Depressive disorder      Diabetes (H)     Type 2 DM/No Insulin      History of blood transfusion     10/2019     Liver cirrhosis secondary to CUETO (H) 5/28/2020     Thyroid disease         Past Surgical History   I have reviewed " this patient's surgical history and updated it with pertinent information if needed.  Past Surgical History:   Procedure Laterality Date     ABDOMEN SURGERY      Gastric Bypass 2006     APPENDECTOMY           COLONOSCOPY      2020 at [facility]      ENT SURGERY       GI SURGERY      EGD 2020 at [facility]      GYN SURGERY      2010     VASCULAR SURGERY          Social History   I have reviewed this patient's social history and updated it with pertinent information if needed. Susan Puckett  reports that she has never smoked. She has never used smokeless tobacco. She reports previous alcohol use. She reports that she does not use drugs.    Family History   Noncontributory    Prior to Admission Medications   Prior to Admission Medications   Prescriptions Last Dose Informant Patient Reported? Taking?   Calcium Citrate 200 MG TABS   Yes No   Sig: Take 950 mg by mouth   Ferrous Sulfate (IRON) 325 (65 FE) MG tablet   Yes No   Sig: Take 1 tablet by mouth daily   Lancets MISC   Yes No   Si each   ORDER FOR DME   No No   Sig: Equipment being ordered: left ankle walking boot   SUMAtriptan (IMITREX) 50 MG tablet   Yes No   Sig: Take 50 mg by mouth   Vitamin K, Phytonadione, 100 MCG TABS   Yes No   Sig: Take 100 mcg by mouth   acetaminophen-codeine 300-30 MG per tablet   No No   Sig: Take 1-2 tablets by mouth every 6 hours as needed for mild pain   atorvastatin (LIPITOR) 20 MG tablet   Yes No   Sig: Take 20 mg by mouth   blood glucose monitoring (ACCU-CHEK AUDREY PLUS) meter device kit   Yes No   Sig: Use  to test daily. Use as directed. Pharmacy dispense brand based on insurance.   calcium carbonate (TUMS) 500 MG chewable tablet   Yes No   Sig: Take 1 tablet by mouth   calcium citrate-vitamin D (CITRACAL W/D) 250-100 MG-UNIT tablet   No No   Sig: Take 2 tablets by mouth 2 times daily (with meals)   cholecalciferol (VITAMIN D-1000 MAX ST) 25 MCG (1000 UT) TABS   Yes No   Sig: Take 4,000 Units by mouth    cyanocobalamin (VITAMIN B-12) 1000 MCG tablet   Yes No   Sig: Take 1,000 mcg by mouth   folic acid (FOLVITE) 1 MG tablet   Yes No   Sig: Take 1 mg by mouth   furosemide (LASIX) 20 MG tablet   Yes No   Sig: Take 20 mg by mouth daily   hydrOXYzine (ATARAX) 25 MG tablet   Yes No   Sig: Take 25 mg by mouth   lactulose (CHRONULAC) 10 GM/15ML solution   Yes No   Sig: Take 20 g by mouth   levothyroxine (SYNTHROID) 175 MCG tablet   Yes No   Sig: Take 175 mcg by mouth daily   metFORMIN (GLUCOPHAGE) 1000 MG tablet   Yes No   Sig: Take 1,000 mg by mouth   multivitamin (DEKAS ESSENTIAL) capsule   Yes No   Sig: Take 1 capsule by mouth   omeprazole (PRILOSEC) 20 MG DR capsule   Yes No   Sig: Take 20 mg by mouth   ondansetron (ZOFRAN-ODT) 4 MG ODT tab   Yes No   Sig: Place 4 mg under the tongue   rifaximin (XIFAXAN) 550 MG TABS tablet   Yes No   Sig: Take 550 mg by mouth   spironolactone (ALDACTONE) 100 MG tablet   Yes No   Sig: Take 200 mg by mouth   traZODone (DESYREL) 100 MG tablet   Yes No   Sig: Take  mg by mouth   valACYclovir (VALTREX) 1000 mg tablet   Yes No   vitamin A 3 MG (09985 UNITS) capsule   Yes No   Sig: Take 10,000 Units by mouth   vitamin C (ASCORBIC ACID) 100 MG tablet   Yes No   Sig: Take 100 mg by mouth   vitamin D2 (ERGOCALCIFEROL) 05172 units (1250 mcg) capsule   No No   Sig: Take 1 capsule (50,000 Units) by mouth once a week for 10 doses   vitamin E (TOCOPHEROL) 400 units (180 mg) capsule   Yes No   Sig: Take 400 Units by mouth      Facility-Administered Medications: None     Allergies   Allergies   Allergen Reactions     Prednisone      Nsaids Other (See Comments)       Physical Exam   Vital Signs: Temp: 97.2  F (36.2  C) Temp src: Oral BP: 99/71 Pulse: 77   Resp: 16 SpO2: 100 % O2 Device: None (Room air)    Weight: 133 lbs 6.4 oz    General Appearance: Alert, Conversational, NAD  Eyes: Sclera anicteric, EOM grossly intact  HEENT: Normocephalic  Respiratory: Lungs CTAB across bilateral anterior and  "posterior fields  Cardiovascular: RRR, no m/r/g  GI: Abdomen soft, nontender nondistended, no fluid wave  Skin: No rash on limited exam  Musculoskeletal: Moving all extremities appropriately  Neurologic: Alert and oriented x3, CNII-XII grossly intact  Psychiatric: Reactive affect, mood is \"crummy\"    MELD-Na score: 11 at 10/19/2020  7:25 AM  MELD score: 11 at 10/19/2020  7:25 AM  Calculated from:  Serum Creatinine: 1.31 mg/dL at 10/19/2020  7:25 AM  Serum Sodium: 130 mmol/L at 10/19/2020  7:25 AM  Total Bilirubin: 1.2 mg/dL at 10/19/2020  7:25 AM  INR(ratio): 1.1 at 10/19/2020  7:25 AM  Age: 50 years 3 months    Data   Data reviewed today: I reviewed all medications, new labs and imaging results over the last 24 hours. I personally reviewed no images or EKG's today.    Recent Labs   Lab 10/25/20  0216 10/20/20  0732   WBC 6.2 5.8   HGB 11.2* 12.4   * 101*   * 139*     --    POTASSIUM 3.8  --    CHLORIDE 96  --    CO2 27  --    BUN 25  --    CR 0.90  --    ANIONGAP 11  --    MAURI 8.1*  --    *  --    ALBUMIN 2.9*  --    PROTTOTAL 7.3  --    BILITOTAL 0.9  --    ALKPHOS 105  --    ALT 50  --    AST 74*  --      "

## 2020-10-25 NOTE — ED PROVIDER NOTES
ED Provider Note  [facility]      History     Chief Complaint   Patient presents with     Generalized Weakness     HPI  Susan Puckett is a 50 year old female with CUETO who presents to the ER feeling like she is had some generalized weakness and unsteadiness on her feet for the last couple of days.  She has had chronic ongoing headaches for several weeks.  No change in this.  She also feels for several weeks that she has had some change in her vision.  No slurred speech.  She does feel slightly mentally foggy for the last couple of days as well.  No sore throat, fever, cough, vomiting.  She does feel a little bit of increased fullness in the suprapubic region.  No dysuria or hematuria.  She states that she had 2 stools today which were formed.  She thinks she had about 2 stools yesterday as well.  She states that she called in the clinic this morning was told to take an extra dose of lactulose, which she did.  She states she is concerned that her ammonia is high.  No other acute complaints.    Past Medical History  Past Medical History:   Diagnosis Date     Depressive disorder      Diabetes (H)     Type 2 DM/No Insulin      History of blood transfusion     10/2019     Liver cirrhosis secondary to CUETO (H) 5/28/2020     Thyroid disease      Past Surgical History:   Procedure Laterality Date     ABDOMEN SURGERY      Gastric Bypass 2006     APPENDECTOMY      1989     COLONOSCOPY      1/2020 at [facility]      ENT SURGERY       GI SURGERY      EGD August 2020 at [facility]      GYN SURGERY      2010     VASCULAR SURGERY       No current outpatient medications on file.    Allergies   Allergen Reactions     Prednisone      Nsaids Other (See Comments)     Family History  History reviewed. No pertinent family history.  Social History   Social History     Tobacco Use     Smoking status: Never Smoker     Smokeless tobacco: Never Used   Substance Use Topics     Alcohol use: Not Currently      "Comment: Last drink was in 2017     Drug use: Never      Past medical history, past surgical history, medications, allergies, family history, and social history were reviewed with the patient. No additional pertinent items.       Review of Systems  A complete review of systems was performed with pertinent positives and negatives noted in the HPI, and all other systems negative.    Physical Exam   BP: 123/78  Pulse: 96  Temp: 98.1  F (36.7  C)  Resp: 18  Height: 162.6 cm (5' 4\")  Weight: 60.5 kg (133 lb 6.4 oz)  SpO2: 97 %  Physical Exam  Constitutional:       General: She is not in acute distress.     Appearance: She is not diaphoretic.      Comments: thin   HENT:      Head: Atraumatic.      Mouth/Throat:      Pharynx: No oropharyngeal exudate.   Eyes:      General: No scleral icterus.     Pupils: Pupils are equal, round, and reactive to light.   Cardiovascular:      Heart sounds: Normal heart sounds.   Pulmonary:      Effort: No respiratory distress.      Breath sounds: Normal breath sounds.   Abdominal:      Palpations: Abdomen is soft.      Tenderness: There is no abdominal tenderness (mild suprapubic tenderness).   Musculoskeletal:         General: No tenderness.   Skin:     General: Skin is warm.      Findings: No rash.   Neurological:      Mental Status: She is oriented to person, place, and time.      Cranial Nerves: No cranial nerve deficit.      Sensory: No sensory deficit.      Comments: No focal deficits. Strength equal bilaterally but seems generally weak         ED Course      Procedures                         Results for orders placed or performed during the hospital encounter of 10/25/20   CBC with platelets differential     Status: Abnormal   Result Value Ref Range    WBC 6.2 4.0 - 11.0 10e9/L    RBC Count 3.32 (L) 3.8 - 5.2 10e12/L    Hemoglobin 11.2 (L) 11.7 - 15.7 g/dL    Hematocrit 33.8 (L) 35.0 - 47.0 %     (H) 78 - 100 fl    MCH 33.7 (H) 26.5 - 33.0 pg    MCHC 33.1 31.5 - 36.5 g/dL    " RDW 14.4 10.0 - 15.0 %    Platelet Count 135 (L) 150 - 450 10e9/L    Diff Method Automated Method     % Neutrophils 55.1 %    % Lymphocytes 33.8 %    % Monocytes 7.9 %    % Eosinophils 2.6 %    % Basophils 0.3 %    % Immature Granulocytes 0.3 %    Nucleated RBCs 0 0 /100    Absolute Neutrophil 3.4 1.6 - 8.3 10e9/L    Absolute Lymphocytes 2.1 0.8 - 5.3 10e9/L    Absolute Monocytes 0.5 0.0 - 1.3 10e9/L    Absolute Eosinophils 0.2 0.0 - 0.7 10e9/L    Absolute Basophils 0.0 0.0 - 0.2 10e9/L    Abs Immature Granulocytes 0.0 0 - 0.4 10e9/L    Absolute Nucleated RBC 0.0    Comprehensive metabolic panel     Status: Abnormal   Result Value Ref Range    Sodium 133 133 - 144 mmol/L    Potassium 3.8 3.4 - 5.3 mmol/L    Chloride 96 94 - 109 mmol/L    Carbon Dioxide 27 20 - 32 mmol/L    Anion Gap 11 3 - 14 mmol/L    Glucose 129 (H) 70 - 99 mg/dL    Urea Nitrogen 25 7 - 30 mg/dL    Creatinine 0.90 0.52 - 1.04 mg/dL    GFR Estimate 75 >60 mL/min/[1.73_m2]    GFR Estimate If Black 87 >60 mL/min/[1.73_m2]    Calcium 8.1 (L) 8.5 - 10.1 mg/dL    Bilirubin Total 0.9 0.2 - 1.3 mg/dL    Albumin 2.9 (L) 3.4 - 5.0 g/dL    Protein Total 7.3 6.8 - 8.8 g/dL    Alkaline Phosphatase 105 40 - 150 U/L    ALT 50 0 - 50 U/L    AST 74 (H) 0 - 45 U/L   Ammonia (on ice)     Status: Abnormal   Result Value Ref Range    Ammonia 109 (HH) 10 - 50 umol/L     Medications   lidocaine 1 % 0.1-1 mL (has no administration in time range)   lidocaine (LMX4) cream (has no administration in time range)   sodium chloride (PF) 0.9% PF flush 3 mL (has no administration in time range)   sodium chloride (PF) 0.9% PF flush 3 mL (3 mLs Intracatheter Given 10/25/20 0638)   naloxone (NARCAN) injection 0.1-0.4 mg (has no administration in time range)   melatonin tablet 1 mg (has no administration in time range)   senna-docusate (SENOKOT-S/PERICOLACE) 8.6-50 MG per tablet 1 tablet (has no administration in time range)     Or   senna-docusate (SENOKOT-S/PERICOLACE) 8.6-50 MG  per tablet 2 tablet (has no administration in time range)   polyethylene glycol (MIRALAX) Packet 17 g (has no administration in time range)   bisacodyl (DULCOLAX) Suppository 10 mg (has no administration in time range)   ondansetron (ZOFRAN-ODT) ODT tab 4 mg (has no administration in time range)     Or   ondansetron (ZOFRAN) injection 4 mg (has no administration in time range)   prochlorperazine (COMPAZINE) injection 10 mg (has no administration in time range)     Or   prochlorperazine (COMPAZINE) tablet 10 mg (has no administration in time range)     Or   prochlorperazine (COMPAZINE) suppository 25 mg (has no administration in time range)   metoclopramide (REGLAN) tablet 10 mg (has no administration in time range)     Or   metoclopramide (REGLAN) injection 10 mg (has no administration in time range)   atorvastatin (LIPITOR) tablet 20 mg (has no administration in time range)   ferrous sulfate (FEROSUL) tablet 325 mg (has no administration in time range)   Vitamin K (Phytonadione) TABS 100 mcg (has no administration in time range)   furosemide (LASIX) tablet 20 mg (has no administration in time range)   hydrOXYzine (ATARAX) tablet 25 mg (has no administration in time range)   spironolactone (ALDACTONE) tablet 200 mg (has no administration in time range)   traZODone (DESYREL) tablet  mg (has no administration in time range)   lactulose (CHRONULAC) solution 20 g (has no administration in time range)   levothyroxine (SYNTHROID/LEVOTHROID) tablet 175 mcg (has no administration in time range)   metFORMIN (GLUCOPHAGE) tablet 1,000 mg (has no administration in time range)   omeprazole (priLOSEC) CR capsule 20 mg (has no administration in time range)   SUMAtriptan (IMITREX) tablet 50 mg (has no administration in time range)   lactulose (CEPHULAC) Packet 20 g (has no administration in time range)   levothyroxine (SYNTHROID/LEVOTHROID) tablet 200 mcg (has no administration in time range)   0.9% sodium chloride BOLUS (0  mLs Intravenous Stopped 10/25/20 0543)   lactulose (CHRONULAC) solution 20 g (20 g Oral Given 10/25/20 1914)        Assessments & Plan (with Medical Decision Making)   Patient seems to have some generalized weakness, though she does have a nonfocal exam.  Ammonia was checked and is quite high at 109.  That seems likely the cause of her underlying symptoms.  She does state that she is not been stooling as much as before, even though she is compliant with her meds.  I also did order urinalysis but she is not given a sample yet.  Her abdominal exam is benign with exception of some mild suprapubic discomfort, so I do think that a UA may be helpful.  I do not have high suspicion for SBP, she does not have any other infectious signs or symptoms.  She will be admitted to internal medicine for further management.    Dictation Disclaimer: Some of this Note has been completed with voice-recognition dictation software. Although errors are generally corrected real-time, there is the potential for a rare error to be present in the completed chart.      I have reviewed the nursing notes. I have reviewed the findings, diagnosis, plan and need for follow up with the patient.    Current Discharge Medication List          Final diagnoses:   Hepatic encephalopathy (H)   CUETO (nonalcoholic steatohepatitis)       --  Shelby Coombs  [facility] EMERGENCY DEPARTMENT  10/24/2020     Shelby Coombs MD  10/25/20 0685

## 2020-10-25 NOTE — PLAN OF CARE
"Activity: SBA  Neuros: A&O x4, pt reports feeling \"foggy\" but neuros are intact. Denies numbness and tingling..  Cardiac: WDL, denies cardiac chest pain.  Respiratory: WDL, stable on RA. LS clear/diminished, pt reports she sometimes develops ascites in R lung.  GI/: Voiding adequate amounts. On scheduled lactulose (PRN doses available), goal is to have 3-4 BM/day. LBM this morning in ED.  Diet: Regular, poor appetite.  Skin/Incisions: Warm, dry, intact. Patches of dry, red skin noted throughout body. Team aware. No new deficits noted.  Lines/Drains: L PIV SL  Labs: Reviewed. , 135, 161. Covered per orders.  Pain/nausea:  Pt complained of headache earlier, PRN tylenol administered and verbalized relief. Pt reports some nausea with eating, but declined interventions.  New changes this shift:  Pts metformin discontinued and pt placed on sliding scale of ACHS BG checks.  Plan: Continue to monitor ammonia and creatine levels. Follow POC.      "

## 2020-10-25 NOTE — ED TRIAGE NOTES
"Pt presents ambulatory to triage with c/o of generalized weakness and mental \"fogginess.\" Awaiting liver transplant. Spoke with transplant coordinator earlier and was told to take an additional dose of lactulose and to come to the ED if unable to have an BM or if symptoms do not improve. Hx encephalitis in september   Sindy Goins RN on 10/25/2020 at 1:44 AM    "

## 2020-10-25 NOTE — PLAN OF CARE
"Shift: pt arrived to floor from ED around 0600 - 0700  VS: /82 (BP Location: Left arm)   Pulse 83   Temp 97  F (36.1  C) (Oral)   Resp 16   Ht 1.626 m (5' 4\")   Wt 60.5 kg (133 lb 6.4 oz)   SpO2 100%   BMI 22.90 kg/m      Neuro: A&Ox4, pt reports fatigue and \"general mind fog\", CMS intact  Cardiac: WDL  Resp: lung sounds clear, no SOB reported, sating well on RA  GI: pt reports ab slightly more distended from baseline, passing gas, bowel sounds active, LBM in ED after lactulose dose  : voiding spontaneously   Skin: see skin now  Pain/Nausea: denies pain or nausea  LDA: L PIV SL  Diet: NPO with ice chips  Mobility: SBA  Labs: reviewed, ab US complete, UA needs to be collected  Plan: continue plan of care     "

## 2020-10-25 NOTE — PHARMACY-ADMISSION MEDICATION HISTORY
Admission Medication History Completed by Pharmacy    See [facility] Admission Navigator for allergy information, preferred outpatient pharmacy, prior to admission medications and immunization status.     Medication History Sources:     Patient    SureScripts    Transplant coordinator notes    Changes made to PTA medication list (reason):    Added:  o APAP    Deleted:  o APAP-codeine (on APAP now)  o Calcium citrate (duplicate)    Changed:   o Added frequencies to most medications  o Updated trazodone dose from  mg to 100 mg  o Updated furosemide dose from 20 mg to 40 mg  o Added levothyroxine 200 mcg on Monday and Thursday and edited the 175 mcg SIG    Additional Information:    Pt was a fairly knowledgeable medication historian. She knew her medication names and frequencies and some doses    Pt says she was told she had low vitamin D levels, so she was told to stop taking her daily vitamin D 1,000 units every day and was started on a 10 week course of vitamin D 50,000 units capsules weekly. She started this on Wednesday 10/21 and accidentally took another dose on 10/22 because she was used to taking a daily vitamin D    Pt denies taking other OTC products, herbal supplements, or vitamins    Prior to Admission medications    Medication Sig Last Dose Taking? Auth Provider   acetaminophen (TYLENOL) 325 MG tablet Take 325-650 mg by mouth every 6 hours as needed for mild pain Past Week at Unknown time Yes Unknown, Entered By History   atorvastatin (LIPITOR) 20 MG tablet Take 20 mg by mouth daily  10/24/2020 at Unknown time Yes Reported, Patient   calcium carbonate (TUMS) 500 MG chewable tablet Take 1 tablet by mouth daily as needed for heartburn  Past Week at Unknown time Yes Reported, Patient   calcium citrate-vitamin D (CITRACAL W/D) 250-100 MG-UNIT tablet Take 2 tablets by mouth 2 times daily (with meals) 10/24/2020 at Unknown time Yes Raphael Cook MD   cholecalciferol (VITAMIN D-1000 MAX ST) 25 MCG (1000 UT)  TABS Take 4,000 Units by mouth Past Week at Unknown time Yes Reported, Patient   cyanocobalamin (VITAMIN B-12) 1000 MCG tablet Take 1,000 mcg by mouth daily  10/24/2020 at Unknown time Yes Reported, Patient   Ferrous Sulfate (IRON) 325 (65 FE) MG tablet Take 1 tablet by mouth daily 10/24/2020 at Unknown time Yes Reported, Patient   folic acid (FOLVITE) 1 MG tablet Take 1 mg by mouth daily  10/24/2020 at Unknown time Yes Reported, Patient   furosemide (LASIX) 40 MG tablet Take 40 mg by mouth daily  10/24/2020 at Unknown time Yes Reported, Patient   hydrOXYzine (ATARAX) 25 MG tablet Take 25 mg by mouth every evening  10/24/2020 at Unknown time Yes Reported, Patient   lactulose (CHRONULAC) 10 GM/15ML solution Take 20 g by mouth 2 times daily  10/24/2020 at Unknown time Yes Reported, Patient   levothyroxine (SYNTHROID) 175 MCG tablet Take 175 mcg by mouth every day except for Monday and Thursday 10/24/2020 at Unknown time Yes Reported, Patient   levothyroxine (SYNTHROID/LEVOTHROID) 200 MCG tablet Take 200 mcg by mouth on Mondays and Thursdays Past Week at Unknown time Yes Unknown, Entered By History   metFORMIN (GLUCOPHAGE) 1000 MG tablet Take 1,000 mg by mouth 2 times daily (with meals)  10/24/2020 at Unknown time Yes Reported, Patient   multivitamin (DEKAS ESSENTIAL) capsule Take 1 capsule by mouth daily  10/24/2020 at Unknown time Yes Reported, Patient   omeprazole (PRILOSEC) 20 MG DR capsule Take 20 mg by mouth daily  10/24/2020 at Unknown time Yes Reported, Patient   ondansetron (ZOFRAN-ODT) 4 MG ODT tab Place 4 mg under the tongue every 6 hours as needed for nausea  10/24/2020 at Unknown time Yes Reported, Patient   rifaximin (XIFAXAN) 550 MG TABS tablet Take 550 mg by mouth 2 times daily  10/24/2020 at Unknown time Yes Reported, Patient   spironolactone (ALDACTONE) 100 MG tablet Take 200 mg by mouth daily  10/24/2020 at Unknown time Yes Reported, Patient   SUMAtriptan (IMITREX) 50 MG tablet Take 50 mg by mouth at  onset of headache for migraine  Past Week at Unknown time Yes Reported, Patient   traZODone (DESYREL) 100 MG tablet Take 100 mg by mouth At Bedtime  10/24/2020 at Unknown time Yes Reported, Patient   vitamin A 3 MG (93930 UNITS) capsule Take 10,000 Units by mouth daily  10/24/2020 at Unknown time Yes Reported, Patient   vitamin C (ASCORBIC ACID) 100 MG tablet Take 100 mg by mouth daily  10/24/2020 at Unknown time Yes Reported, Patient   vitamin D2 (ERGOCALCIFEROL) 89868 units (1250 mcg) capsule Take 1 capsule (50,000 Units) by mouth once a week for 10 doses 10/22/2020 at Unknown time Yes Coko, Raphael WALTERS MD   vitamin E (TOCOPHEROL) 400 units (180 mg) capsule Take 400 Units by mouth daily  10/24/2020 at Unknown time Yes Reported, Patient   Vitamin K, Phytonadione, 100 MCG TABS Take 100 mcg by mouth daily  10/24/2020 at Unknown time Yes Reported, Patient   valACYclovir (VALTREX) 1000 mg tablet Take 1,000 mg by mouth daily as needed (at onset of cold sore)  More than a month at Unknown time  Reported, Patient     Date completed: 10/25/20    Medication history completed by: Elaina Anguiano [facility]

## 2020-10-26 ENCOUNTER — APPOINTMENT (OUTPATIENT)
Dept: CT IMAGING | Facility: CLINIC | Age: 50
DRG: 442 | End: 2020-10-26
Payer: COMMERCIAL

## 2020-10-26 ENCOUNTER — PATIENT OUTREACH (OUTPATIENT)
Dept: CARE COORDINATION | Facility: CLINIC | Age: 50
End: 2020-10-26

## 2020-10-26 VITALS
OXYGEN SATURATION: 96 % | TEMPERATURE: 97.5 F | BODY MASS INDEX: 22.77 KG/M2 | SYSTOLIC BLOOD PRESSURE: 108 MMHG | HEART RATE: 87 BPM | RESPIRATION RATE: 16 BRPM | WEIGHT: 133.4 LBS | HEIGHT: 64 IN | DIASTOLIC BLOOD PRESSURE: 74 MMHG

## 2020-10-26 LAB
AMMONIA PLAS-SCNC: 141 UMOL/L (ref 10–50)
ANION GAP SERPL CALCULATED.3IONS-SCNC: 8 MMOL/L (ref 3–14)
BUN SERPL-MCNC: 15 MG/DL (ref 7–30)
CALCIUM SERPL-MCNC: 8 MG/DL (ref 8.5–10.1)
CHLORIDE SERPL-SCNC: 103 MMOL/L (ref 94–109)
CO2 SERPL-SCNC: 24 MMOL/L (ref 20–32)
CREAT SERPL-MCNC: 0.86 MG/DL (ref 0.52–1.04)
ERYTHROCYTE [DISTWIDTH] IN BLOOD BY AUTOMATED COUNT: 14.5 % (ref 10–15)
GFR SERPL CREATININE-BSD FRML MDRD: 80 ML/MIN/{1.73_M2}
GLUCOSE BLDC GLUCOMTR-MCNC: 101 MG/DL (ref 70–99)
GLUCOSE BLDC GLUCOMTR-MCNC: 235 MG/DL (ref 70–99)
GLUCOSE SERPL-MCNC: 140 MG/DL (ref 70–99)
HCT VFR BLD AUTO: 31.8 % (ref 35–47)
HGB BLD-MCNC: 10.7 G/DL (ref 11.7–15.7)
INR PPP: 1.23 (ref 0.86–1.14)
MCH RBC QN AUTO: 34.9 PG (ref 26.5–33)
MCHC RBC AUTO-ENTMCNC: 33.6 G/DL (ref 31.5–36.5)
MCV RBC AUTO: 104 FL (ref 78–100)
PLATELET # BLD AUTO: 105 10E9/L (ref 150–450)
POTASSIUM SERPL-SCNC: 4.3 MMOL/L (ref 3.4–5.3)
RBC # BLD AUTO: 3.07 10E12/L (ref 3.8–5.2)
SARS-COV-2 RNA SPEC QL NAA+PROBE: NOT DETECTED
SODIUM SERPL-SCNC: 135 MMOL/L (ref 133–144)
SPECIMEN SOURCE: NORMAL
WBC # BLD AUTO: 6.5 10E9/L (ref 4–11)

## 2020-10-26 PROCEDURE — 36415 COLL VENOUS BLD VENIPUNCTURE: CPT | Performed by: INTERNAL MEDICINE

## 2020-10-26 PROCEDURE — 99221 1ST HOSP IP/OBS SF/LOW 40: CPT | Mod: GC | Performed by: STUDENT IN AN ORGANIZED HEALTH CARE EDUCATION/TRAINING PROGRAM

## 2020-10-26 PROCEDURE — 80048 BASIC METABOLIC PNL TOTAL CA: CPT | Performed by: INTERNAL MEDICINE

## 2020-10-26 PROCEDURE — 250N000011 HC RX IP 250 OP 636: Performed by: STUDENT IN AN ORGANIZED HEALTH CARE EDUCATION/TRAINING PROGRAM

## 2020-10-26 PROCEDURE — 74177 CT ABD & PELVIS W/CONTRAST: CPT | Mod: 26 | Performed by: RADIOLOGY

## 2020-10-26 PROCEDURE — 999N001017 HC STATISTIC GLUCOSE BY METER IP

## 2020-10-26 PROCEDURE — 82140 ASSAY OF AMMONIA: CPT | Performed by: STUDENT IN AN ORGANIZED HEALTH CARE EDUCATION/TRAINING PROGRAM

## 2020-10-26 PROCEDURE — 85027 COMPLETE CBC AUTOMATED: CPT | Performed by: STUDENT IN AN ORGANIZED HEALTH CARE EDUCATION/TRAINING PROGRAM

## 2020-10-26 PROCEDURE — 74177 CT ABD & PELVIS W/CONTRAST: CPT

## 2020-10-26 PROCEDURE — 250N000013 HC RX MED GY IP 250 OP 250 PS 637: Performed by: EMERGENCY MEDICINE

## 2020-10-26 PROCEDURE — 99239 HOSP IP/OBS DSCHRG MGMT >30: CPT | Performed by: INTERNAL MEDICINE

## 2020-10-26 PROCEDURE — 250N000013 HC RX MED GY IP 250 OP 250 PS 637: Performed by: INTERNAL MEDICINE

## 2020-10-26 PROCEDURE — 250N000013 HC RX MED GY IP 250 OP 250 PS 637: Performed by: STUDENT IN AN ORGANIZED HEALTH CARE EDUCATION/TRAINING PROGRAM

## 2020-10-26 PROCEDURE — 36415 COLL VENOUS BLD VENIPUNCTURE: CPT | Performed by: STUDENT IN AN ORGANIZED HEALTH CARE EDUCATION/TRAINING PROGRAM

## 2020-10-26 PROCEDURE — 85610 PROTHROMBIN TIME: CPT | Performed by: INTERNAL MEDICINE

## 2020-10-26 RX ORDER — NITROFURANTOIN 25; 75 MG/1; MG/1
100 CAPSULE ORAL 2 TIMES DAILY
Qty: 8 CAPSULE | Refills: 0 | Status: SHIPPED | OUTPATIENT
Start: 2020-10-26 | End: 2020-10-30

## 2020-10-26 RX ORDER — IOPAMIDOL 755 MG/ML
81 INJECTION, SOLUTION INTRAVASCULAR ONCE
Status: COMPLETED | OUTPATIENT
Start: 2020-10-26 | End: 2020-10-26

## 2020-10-26 RX ADMIN — FERROUS SULFATE TAB 325 MG (65 MG ELEMENTAL FE) 325 MG: 325 (65 FE) TAB at 07:37

## 2020-10-26 RX ADMIN — SPIRONOLACTONE 200 MG: 50 TABLET, FILM COATED ORAL at 07:37

## 2020-10-26 RX ADMIN — ONDANSETRON 4 MG: 4 TABLET, ORALLY DISINTEGRATING ORAL at 10:05

## 2020-10-26 RX ADMIN — INSULIN ASPART 1 UNITS: 100 INJECTION, SOLUTION INTRAVENOUS; SUBCUTANEOUS at 08:31

## 2020-10-26 RX ADMIN — Medication 0.1 MG: at 07:37

## 2020-10-26 RX ADMIN — Medication 10 ML: at 11:28

## 2020-10-26 RX ADMIN — LACTULOSE 20 G: 20 SOLUTION ORAL at 13:14

## 2020-10-26 RX ADMIN — Medication 10 ML: at 07:37

## 2020-10-26 RX ADMIN — Medication 10 ML: at 02:19

## 2020-10-26 RX ADMIN — INSULIN ASPART 1 UNITS: 100 INJECTION, SOLUTION INTRAVENOUS; SUBCUTANEOUS at 11:25

## 2020-10-26 RX ADMIN — NITROFURANTOIN (MONOHYDRATE/MACROCRYSTALS) 100 MG: 75; 25 CAPSULE ORAL at 02:19

## 2020-10-26 RX ADMIN — FUROSEMIDE 20 MG: 20 TABLET ORAL at 07:39

## 2020-10-26 RX ADMIN — LEVOTHYROXINE SODIUM 200 MCG: 100 TABLET ORAL at 07:36

## 2020-10-26 RX ADMIN — LACTULOSE 20 G: 20 SOLUTION ORAL at 07:36

## 2020-10-26 RX ADMIN — ATORVASTATIN CALCIUM 20 MG: 20 TABLET, FILM COATED ORAL at 07:36

## 2020-10-26 RX ADMIN — IOPAMIDOL 81 ML: 755 INJECTION, SOLUTION INTRAVENOUS at 12:14

## 2020-10-26 RX ADMIN — NITROFURANTOIN (MONOHYDRATE/MACROCRYSTALS) 100 MG: 75; 25 CAPSULE ORAL at 09:01

## 2020-10-26 RX ADMIN — RIFAXIMIN 550 MG: 550 TABLET ORAL at 11:24

## 2020-10-26 RX ADMIN — Medication 10 ML: at 15:21

## 2020-10-26 ASSESSMENT — ACTIVITIES OF DAILY LIVING (ADL)
ADLS_ACUITY_SCORE: 17
ADLS_ACUITY_SCORE: 15
ADLS_ACUITY_SCORE: 15

## 2020-10-26 NOTE — PLAN OF CARE
"Shift: 1900 - 0700  VS: /74 (BP Location: Left arm)   Pulse 83   Temp 97.1  F (36.2  C) (Oral)   Resp 16   Ht 1.626 m (5' 4\")   Wt 60.5 kg (133 lb 6.4 oz)   SpO2 99%   BMI 22.90 kg/m     Neuro: A&Ox4, pt reports fatigue and \"general mind fog\", Imitrex given for onset of migraine, CMS intact  Cardiac: WDL  Resp: lung sounds clear, no SOB reported, sating well on RA  GI: pt reports ab slightly more distended from baseline, passing gas, bowel sounds active, 4 BMs yesterday & 1 today  : voiding spontaneously   Skin: pink flaky dry patches throughout body  Pain/Nausea: denies pain ex headache or nausea  LDA: L PIV SL  Diet: regular  Mobility: up ad adriano  Labs: reviewed, urine cultured for UTI,  & 101  Plan: continue plan of care   "

## 2020-10-26 NOTE — PLAN OF CARE
DISCHARGE                         10/26/20 @ 6PM.  ----------------------------------------------------------------------------  Discharged to: Home  Via: private transportation  Accompanied by: Family  Discharge Instructions: low salt diet, activity as tolerated, medications, follow up appointments, when to call the MD, aftercare instructions.  Prescriptions: To be filled by [facility] pharmacy; medication list reviewed & sent with pt  Follow Up Appointments: arranged; information given  Belongings: All sent with pt  IV: d/c'd  Pt exhibits understanding of above discharge instructions; all questions answered.    Discharge Paperwork: Signed, copied, and sent home with patient.

## 2020-10-26 NOTE — CONSULTS
HEPATOLOGY CONSULTATION    Date: 10/26/2020  Date of Admission: 10/25/2020  Reason for Consultation: HE  Requested by:   Dr. Noel  Brief Summary:   We were asked by Dr. 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       MELD-Na score: 8 at 10/26/2020  9:16 AM  MELD score: 8 at 10/26/2020  9:16 AM  Calculated from:  Serum Creatinine: 0.86 mg/dL (Rounded to 1 mg/dL) at 10/26/2020  7:17 AM  Serum Sodium: 135 mmol/L at 10/26/2020  7:17 AM  Total Bilirubin: 0.9 mg/dL (Rounded to 1 mg/dL) at 10/25/2020  2:16 AM  INR(ratio): 1.23 at 10/26/2020  9:16 AM  Age: 50 years 3 months    Decompensated CUETO Cirrhosis  Pt has CUETO cirrhosis which is decompensated due to HE secondary to UTI, mental status improved. Pt is undergoing transplant eval. Her MELD is low and was considered for potential living donor.    - Etiology:CUETO  - Date of diagnosis: 2019  - Primary hepatologist:Dr. Cook  - MELD-Na:11  - Portal Hypertension:  Y, no TIPS  - HE:  + Laculose, rifaximin  - Ascites:   + diuretics, -SBP, -prophylaxis  - Coagulopathy:INR 1.1  - Thrombocytopenia: Mild 135  - Varices:  GIEV last EGD Aug 2020  - HCC screening: NA  - Thrombosis:  last US: NA  - Transplant candidacy: in Eval      Recommendations  --monitor MELD labs including INR daily  --Repeat US RUQ with doppler  --Treatment of UTI as per primary team  --Consider Ct abd while inpatient - previously ordered as OP for transplant eval  --Recommend continuing Lactulose and Rifaximin, goal 3-4 loose BMs  --Continue PTA diuretics  --Follow up in liver clinic in 4 weeks    Gastroenterology outpatient follow up recommendations: pending clinical course    Thank you for involving us in this patient's care. Please do not hesitate  to contact the GI service with any questions or concerns.     Pt care plan discussed with Dr. Burnette, hepatology staff physician.    This note was created with voice recognition software, and while reviewed for accuracy, typos may remain.    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    Patient stated that she was taking her lactulose and rifaximin along with her diuretics as stated, felt her mind to be foggy 3 days back, she called her liver coordinator who told her to increase the dose of lactulose which she did but the next day persistently felt more foggy and confused finally into the emergency department where she was found to have suprapubic tenderness concerning for UTI.  Patient stated that she did had bowel movements before the admission which were formed.  Now she is in the hospital with more frequent loose bowel movements due to lactulose.  Her mental status is improved.    Pt was diagnosed with liver disease last year.  Patient had 2 episodes of hepatic encephalopathy with hospitalization, she also had developed hepatic hydrothorax as well as ascites which required tapping.  Her last upper endoscopy was in August 2020 showing small esophageal varices.    Denies smoking or alcohol intake  Lives with her             Past Medical History:   Reviewed and edited as appropriate  Past Medical History:   Diagnosis Date    Depressive disorder     Diabetes (H)     Type 2 DM/No Insulin     History of blood transfusion     10/2019    Liver cirrhosis secondary to CUETO (H) 5/28/2020    Thyroid disease             Past Surgical History:   Reviewed and edited as  appropriate   Past Surgical History:   Procedure Laterality Date    ABDOMEN SURGERY      Gastric Bypass 2006    APPENDECTOMY      1989    COLONOSCOPY      1/2020 at [facility]     ENT SURGERY      GI SURGERY      EGD August 2020 at [facility]     GYN SURGERY      2010    VASCULAR SURGERY              Previous Endoscopy:   No results found for this or any previous visit.         Social History:   Reviewed and edited as appropriate  Social History     Socioeconomic History    Marital status:      Spouse name: Not on file    Number of children: Not on file    Years of education: Not on file    Highest education level: Not on file   Occupational History    Not on file   Social Needs    Financial resource strain: Not on file    Food insecurity     Worry: Not on file     Inability: Not on file    Transportation needs     Medical: Not on file     Non-medical: Not on file   Tobacco Use    Smoking status: Never Smoker    Smokeless tobacco: Never Used   Substance and Sexual Activity    Alcohol use: Not Currently     Comment: Last drink was in 2017    Drug use: Never    Sexual activity: Not on file   Lifestyle    Physical activity     Days per week: Not on file     Minutes per session: Not on file    Stress: Not on file   Relationships    Social connections     Talks on phone: Not on file     Gets together: Not on file     Attends Spiritism service: Not on file     Active member of club or organization: Not on file     Attends meetings of clubs or organizations: Not on file     Relationship status: Not on file    Intimate partner violence     Fear of current or ex partner: Not on file     Emotionally abused: Not on file     Physically abused: Not on file     Forced sexual activity: Not on file   Other Topics Concern    Parent/sibling w/ CABG, MI or angioplasty before 65F 55M? Not Asked   Social History Narrative    Not on file            Family History:   Reviewed and edited as appropriate  History reviewed. No  pertinent family history.           Allergies:   Reviewed and edited as appropriate     Allergies   Allergen Reactions    Prednisone     Nsaids Other (See Comments)            Medications:     Current Facility-Administered Medications   Medication    acetaminophen (TYLENOL) tablet 325-650 mg    artificial saliva (BIOTENE DRY MOUTHWASH) liquid 10 mL    atorvastatin (LIPITOR) tablet 20 mg    bisacodyl (DULCOLAX) Suppository 10 mg    glucose gel 15-30 g    Or    dextrose 50 % injection 25-50 mL    Or    glucagon injection 1 mg    ferrous sulfate (FEROSUL) tablet 325 mg    furosemide (LASIX) tablet 20 mg    hydrOXYzine (ATARAX) tablet 25 mg    insulin aspart (NovoLOG) injection (RAPID ACTING)    insulin aspart (NovoLOG) injection (RAPID ACTING)    lactulose (CEPHULAC) Packet 20 g    lactulose (CHRONULAC) solution 20 g    levothyroxine (SYNTHROID/LEVOTHROID) tablet 175 mcg    levothyroxine (SYNTHROID/LEVOTHROID) tablet 200 mcg    lidocaine (LMX4) cream    lidocaine 1 % 0.1-1 mL    melatonin tablet 1 mg    metoclopramide (REGLAN) tablet 10 mg    Or    metoclopramide (REGLAN) injection 10 mg    naloxone (NARCAN) injection 0.1-0.4 mg    nitroFURantoin macrocrystal-monohydrate (MACROBID) capsule 100 mg    omeprazole (priLOSEC) CR capsule 20 mg    ondansetron (ZOFRAN-ODT) ODT tab 4 mg    Or    ondansetron (ZOFRAN) injection 4 mg    phytonadione (MEPHYTON/VITAMIN K) 1 MG/ML oral solution 0.1 mg    polyethylene glycol (MIRALAX) Packet 17 g    prochlorperazine (COMPAZINE) injection 10 mg    Or    prochlorperazine (COMPAZINE) tablet 10 mg    Or    prochlorperazine (COMPAZINE) suppository 25 mg    senna-docusate (SENOKOT-S/PERICOLACE) 8.6-50 MG per tablet 1 tablet    Or    senna-docusate (SENOKOT-S/PERICOLACE) 8.6-50 MG per tablet 2 tablet    sodium chloride (PF) 0.9% PF flush 3 mL    sodium chloride (PF) 0.9% PF flush 3 mL    spironolactone (ALDACTONE) tablet 200 mg    traZODone (DESYREL) tablet  mg             Review of  "Systems:     A complete 10 point review of systems was performed and is negative except as noted in the HPI           Physical Exam:   /64 (BP Location: Left arm)   Pulse 95   Temp 98.5  F (36.9  C) (Oral)   Resp 16   Ht 1.626 m (5' 4\")   Wt 60.5 kg (133 lb 6.4 oz)   SpO2 95%   BMI 22.90 kg/m    Wt:   Wt Readings from Last 2 Encounters:   10/25/20 60.5 kg (133 lb 6.4 oz)   10/20/20 57.5 kg (126 lb 11.2 oz)      Constitutional: cooperative, pleasant  Eyes: Sclera anicteric  Ears/nose/mouth/throat: mucus membranes moist  Neck: supple  CV: No edema  Respiratory: Unlabored breathing  Abd:  Nondistended, +bs, suprapubic tenderness  Skin: warm, perfused, no jaundice  Neuro: AAO x 3, mild asterixis           Data:   Labs and imaging below were independently reviewed and interpreted    BMP  Recent Labs   Lab 10/26/20  0717 10/25/20  0216    133   POTASSIUM 4.3 3.8   CHLORIDE 103 96   MAURI 8.0* 8.1*   CO2 24 27   BUN 15 25   CR 0.86 0.90   * 129*     CBC  Recent Labs   Lab 10/25/20  0216 10/20/20  0732   WBC 6.2 5.8   RBC 3.32* 3.61*   HGB 11.2* 12.4   HCT 33.8* 36.3   * 101*   MCH 33.7* 34.3*   MCHC 33.1 34.2   RDW 14.4 13.9   * 139*     INRNo lab results found in last 7 days.  LFTs  Recent Labs   Lab 10/25/20  0216   ALKPHOS 105   AST 74*   ALT 50   BILITOTAL 0.9   PROTTOTAL 7.3   ALBUMIN 2.9*      PANCNo lab results found in last 7 days.    Imaging:  US ABD 10/25/20:  IMPRESSION:   1.  Diffusely echogenic liver consistent with history of Du. No  focal mass.  2.  Splenomegaly and dilated portal vein suggestive of hypertension.  3.  Mildly thickened gallbladder wall, likely secondary to hepatic  disease. No cholelithiasis or evidence of acute cholecystitis.    EGD 8/25/20:  Impression:          - Ectopic gastric mucosa in the upper                        third of the esophagus.                       - Z-line regular, 36 cm from the                        incisors.                     "   - Grade I esophageal varices (2                        columns) without high-risk stigmata.                       - Gastric bypass with a pouch 4 cm in                        length and intact staple line.                        Gastrojejunal anastomosis                        characterized by healthy appearing                        mucosa. Small gastroenteric fistula.                        Indwelling hemoclip in gastric pouch.                       - No specimens collected. No                        pathological endoscopic findings on                        today's examination to definitively                        explain the patient's                        nausea/vomiting. Unclear if the                        patient is observed esophageal inlet                        patch could be playing a role in her                        sensation of dysphagia.  Recommendation:      - [facility]ing care.                       - Resume previous diet.                       - Repeat upper endoscopy in 1 year                        for portal hypertension restaging.                       - Continue with oral proton pump                        inhibitor to see if this potentially                        improves dysphagia in case it is in                        any way related to the aforementioned                        esophageal inlet patch.

## 2020-10-26 NOTE — DISCHARGE SUMMARY
[facility]   Discharge Summary - Medicine & Pediatrics       Date of Admission:  10/25/2020  Date of Discharge:  10/26/2020  6:07 PM  Discharging Provider: Samantha Noel MD   Discharge Service: Randi 2    Discharge Diagnoses   Uncomplicated UTI   CUETO cirrhosis     Follow-ups Needed After Discharge   Follow-up Appointments     Adult [facility] Follow-up and recommended labs and tests      Follow up with primary care provider, Harleen Billy as needed , for   hospital follow- up. No follow up labs or test are needed.     Follow up with Hepatology and your transplant team as established      Appointments on [place] and/or [facility] (with [facility] or [facility]   provider or service). Call [phone] if you haven't heard regarding   these appointments within 7 days of discharge.             Unresulted Labs Ordered in the Past 30 Days of this Admission     Date and Time Order Name Status Description    10/25/2020 1315 Urine Culture Aerobic Bacterial Preliminary       These results will be followed up by hospitalist     Discharge Disposition   Discharged to home  Condition at discharge: Stable      Hospital Course   Susan Puckett is a 50 year old female with a history of non-alcoholic steatohepatitis undergoing transplant evaluation, gastric bypass, DMII, and hypothyroidism, admitted on 10/25/2020 with myalgias, weakness and bilateral upper extremity tremor concerning for worsening hepatic encephalopathy.     Mr. Puckett underwent an evaluation for triggers that may have precipitated these typical HE symptoms for her, revealing a likely UTI/cystitis despite minimal symptoms (urinary retention, but no fever, hematuria/dysuria/etc). She was started on macrobid with improvement, though final culture results are pending, and felt comfortable discharging the next day.   She otherwise had no significant derangements in her liver or kidney function, and tolerated resumption of her  usual home regimen.   Given her ongoing transplant evaluation, a CTA was obtained at the recommendation of Hepatology. She will follow up with GI and her transplant team regarding these results and next steps in transplant.       Consultations This Hospital Stay   MEDICATION HISTORY IP PHARMACY CONSULT  GI HEPATOLOGY ADULT IP CONSULT    Code Status   No Order       MD Randi Forrester 2 Service  [facility] [address]  [address] [place] [address]  Phone: [phone]  ______________________________________________________________________    Physical Exam   Vital Signs: Temp: 97.5  F (36.4  C) Temp src: Oral BP: 108/74 Pulse: 87   Resp: 16 SpO2: 96 % O2 Device: None (Room air)    Weight: 133 lbs 6.4 oz   Gen: alert, interactive and pleasant, lying in bed in no acute distress  HEENT: Normocephalic/atraumatic, sclera clear, oropharynx with moist mucous membranes  Resp: Clear bilaterally though mildly decreased in R base, easy work of breathing on room air  CV: Regular rate and rhythm, no murmurs noted   Abd: soft, non-tender, nondistended  Ext: no lower extremity edema, warm and well-perfused with brisk capillary refill   Neuro: Alert and oriented x4, grossly non-focal, moving all extremities equally, very mild tremor improved         Primary Care Physician   Harleen Billy    Discharge Orders      Reason for your hospital stay    Dear Susan Puckett    You were hospitalized at [facility] with worsened hepatic encephalopathy and confusion, most likely related to a bladder infection. You were started on antibiotics with improvement, and today you are ready to be discharged home.  If you continue antibiotic therapy you should continue to improve but if you develop fever, shortness of breath, light headedness, chest pain, severe vomiting or any other worrisome symptoms, please seek medical attention.    We are suggesting the following medication changes:  -  nitrofurantoin for the next 4 days. I will call you if there is any concern that this antibiotic will not work based on the urine culture results.     Please get the following tests done: None, you completed the CT scan intended for tomorrow     Please set up an appointment with:  - PCP as needed, Liver docs as previously established    It was a pleasure meeting with you today. Thank you for allowing me and my team the privilege of caring for you. Please let us know if there is anything else we can do for you so that we can be sure you are leaving completely satisfied with your care experience.    Your hospital unit at the time of discharge is 7B so if you have any questions about these discharge instructions please call the general hospital line at [phone] and ask to talk to a nurse on 7B.    Take care!  Samantha Noel MD  Department of Medicine  [facility]     Adult [facility] Follow-up and recommended labs and tests    Follow up with primary care provider, Harleen Billy as needed , for hospital follow- up. No follow up labs or test are needed.     Follow up with Hepatology and your transplant team as established      Appointments on [place] and/or [facility] (with [facility] or [facility] provider or service). Call [phone] if you haven't heard regarding these appointments within 7 days of discharge.     Activity    Your activity upon discharge: activity as tolerated     Diet    Follow this diet upon discharge: low salt       Significant Results and Procedures   Most Recent 3 CBC's:  Recent Labs   Lab Test 10/26/20  0902 10/25/20  0216 10/20/20  0732   WBC 6.5 6.2 5.8   HGB 10.7* 11.2* 12.4   * 102* 101*   * 135* 139*     Most Recent 3 BMP's:  Recent Labs   Lab Test 10/26/20  0717 10/25/20  0216    133   POTASSIUM 4.3 3.8   CHLORIDE 103 96   CO2 24 27   BUN 15 25   CR 0.86 0.90   ANIONGAP 8 11   MAURI 8.0* 8.1*   * 129*     Most Recent 2 LFT's:  Recent Labs   Lab Test  10/25/20  0216   AST 74*   ALT 50   ALKPHOS 105   BILITOTAL 0.9   ,   Results for orders placed or performed during the hospital encounter of 10/25/20   US Abdomen Complete    Narrative    EXAMINATION: US ABDOMEN COMPLETE,  10/25/2020 6:41 AM     COMPARISON: Ultrasound dated 10/20/2020    HISTORY: Du cirrhosis    TECHNIQUE: The abdomen was scanned in standard fashion with  specialized ultrasound transducer(s) using both gray-scale and limited  color Doppler techniques.    FINDINGS:  Liver: Liver parenchyma is diffusely hyperechoic. Liver measures 11.8  cm in craniocaudal dimension, compared to 11.4 cm on prior exam. No  focal mass. The main portal vein is patent with antegrade flow and  measures 1.7 cm in diameter, compared to 1.4 cm on prior exam.    Gallbladder: Gallbladder wall is mildly thickened measuring  approximately 4 mm in diameter. No echogenic stones or sludge.  Sonographic Castillo's sign was negative.     Bile Ducts: No intrahepatic biliary dilatation. The common bile duct  was not visualized.    Pancreas: Pancreas not visualized.    Kidneys: Both kidneys are of normal echotexture, without mass or  hydronephrosis.   The craniocaudal dimensions are: right- 9.7 cm,  left- 10.7 cm.    Spleen: Mild splenomegaly. Spleen measures 14 cm in craniocaudal  dimension. No focal mass.    Aorta and IVC: The aorta and IVC are of scattered by bowel gas..    Fluid: No ascites.      Impression    IMPRESSION:   1.  Diffusely echogenic liver consistent with history of Du. No  focal mass.  2.  Splenomegaly and dilated portal vein suggestive of hypertension.  3.  Mildly thickened gallbladder wall, likely secondary to hepatic  disease. No cholelithiasis or evidence of acute cholecystitis.    I have personally reviewed the examination and initial interpretation  and I agree with the findings.    JORGE LOU MD   CT Abdomen Pelvis w Contrast    Narrative    EXAMINATION: CT ABDOMEN PELVIS W CONTRAST, 10/26/2020 12:44  PM    TECHNIQUE:  Helical CT images from the lung bases through the  symphysis pubis were obtained with IV contrast. Contrast dose:  iopamidol (ISOVUE-370) solution 81 mL    COMPARISON: Ultrasound 10/25/2020, 10/20/2020; MRI 6/30/2020,  12/4/2019; CT 6/4/2020    HISTORY: hx of CUETO, being evaluated for liver transplant, here with  hyperammonemia and UTI    FINDINGS:    Abdomen and pelvis:     Cirrhotic configuration of the hepatic parenchyma, with widening of  the intrahepatic fissures, enlargement of the caudate lobe, and  nodular surface contour. No arterial enhancement within the liver. No  focal liver lesion is identified. Stable prominence of the gallbladder  wall from prior ultrasound. No calcified gallstones. No intra or  extrahepatic biliary dilatation. Moderate fatty atrophy of the  pancreatic parenchyma. No associated ductal dilatation. Few nodular  foci about the pancreatic tail are better characterized on prior MRI,  most compatible with small pancreatic cysts versus side branch IMPNs.  The spleen is enlarged, measuring 14.1 cm in craniocaudal dimension.  Unremarkable adrenal glands. Symmetric nephrographic enhancement of  the kidneys, without hydronephrosis. Unremarkable pelvic organs. No  suspicious adnexal lesion.    Diffuse wall thickening and mild mucosal hyperenhancement of the  colon, similar to prior. No abnormally dilated loops of small bowel to  suggest obstruction. Postsurgical changes of gastric bypass, with mild  dilatation of the postsurgical segment, not out of proportion to  normal postsurgical change. Small volume intra-abdominal ascites. No  intraperitoneal free air. No pneumatosis or portal venous gas. The  major abdominal vasculature appears patent, without evidence of  infrarenal abdominal aortic aneurysm. Multiple prominent, and enlarged  periaortic lymph nodes, for example a lymph node measuring 1.1 cm in  short axis dimension adjacent to the left kidney (series 5, image  195).  Enlarged lymph nodes about the mary hepatis measuring 2.1 cm in  short axis dimension (series 11, image 123). These do not appear  convincingly changed from prior exam. No enlarging lymphadenopathy is  identified.    Lung bases: The visualized lung bases are clear, without pleural  effusion. The heart is normal in size, without pericardial effusion.  Prominent, not enlarged pericardial lymph node measuring 5 mm in short  axis dimension.    Bones and soft tissues: No acute or aggressive appearing osseous  lesion. Mild degenerative changes of the lumbar spine.      Impression    IMPRESSION:   1. Cirrhotic configuration of the liver with evidence of portal  hypertension including splenomegaly and mild intra-abdominal ascites.  No focal liver lesion is identified.  2. Diffuse wall thickening of the large bowel, similar to comparison  CT dated 6/4/2020. Findings are favored to represent portal  hypertensive colopathy, though infectious or inflammatory colitis  could have a similar appearance.  3. Stable prominent and enlarged retroperitoneal and mary hepatic  lymph nodes, favor reactive in the setting of cirrhosis. These do not  appear particularly changed from 6/4/2020.  4. Post surgical changes of gastric bypass.    I have personally reviewed the examination and initial interpretation  and I agree with the findings.    SHAHEEN WAHL, DO       Discharge Medications   Current Discharge Medication List      START taking these medications    Details   nitroFURantoin macrocrystal-monohydrate (MACROBID) 100 MG capsule Take 1 capsule (100 mg) by mouth 2 times daily for 4 days  Qty: 8 capsule, Refills: 0    Associated Diagnoses: Acute cystitis without hematuria         CONTINUE these medications which have NOT CHANGED    Details   atorvastatin (LIPITOR) 20 MG tablet Take 20 mg by mouth daily       calcium carbonate (TUMS) 500 MG chewable tablet Take 1 tablet by mouth daily as needed for heartburn       calcium  citrate-vitamin D (CITRACAL W/D) 250-100 MG-UNIT tablet Take 2 tablets by mouth 2 times daily (with meals)  Qty: 336 tablet, Refills: 3    Associated Diagnoses: Osteopenia      cholecalciferol (VITAMIN D-1000 MAX ST) 25 MCG (1000 UT) TABS Take 4,000 Units by mouth      cyanocobalamin (VITAMIN B-12) 1000 MCG tablet Take 1,000 mcg by mouth daily       Ferrous Sulfate (IRON) 325 (65 FE) MG tablet Take 1 tablet by mouth daily      folic acid (FOLVITE) 1 MG tablet Take 1 mg by mouth daily       furosemide (LASIX) 40 MG tablet Take 40 mg by mouth daily       hydrOXYzine (ATARAX) 25 MG tablet Take 25 mg by mouth every evening       lactulose (CHRONULAC) 10 GM/15ML solution Take 20 g by mouth 2 times daily       levothyroxine (SYNTHROID) 175 MCG tablet Take 175 mcg by mouth every day except for Monday and Thursday      metFORMIN (GLUCOPHAGE) 1000 MG tablet Take 1,000 mg by mouth 2 times daily (with meals)       multivitamin (DEKAS ESSENTIAL) capsule Take 1 capsule by mouth daily       omeprazole (PRILOSEC) 20 MG DR capsule Take 20 mg by mouth daily       ondansetron (ZOFRAN-ODT) 4 MG ODT tab Place 4 mg under the tongue every 6 hours as needed for nausea       rifaximin (XIFAXAN) 550 MG TABS tablet Take 550 mg by mouth 2 times daily       spironolactone (ALDACTONE) 100 MG tablet Take 200 mg by mouth daily       SUMAtriptan (IMITREX) 50 MG tablet Take 50 mg by mouth at onset of headache for migraine       traZODone (DESYREL) 100 MG tablet Take 100 mg by mouth At Bedtime       vitamin A 3 MG (69470 UNITS) capsule Take 10,000 Units by mouth daily       vitamin C (ASCORBIC ACID) 100 MG tablet Take 100 mg by mouth daily       vitamin D2 (ERGOCALCIFEROL) 98331 units (1250 mcg) capsule Take 1 capsule (50,000 Units) by mouth once a week for 10 doses  Qty: 10 capsule, Refills: 0    Associated Diagnoses: Vitamin D deficiency; Osteopenia      vitamin E (TOCOPHEROL) 400 units (180 mg) capsule Take 400 Units by mouth daily       Vitamin  K, Phytonadione, 100 MCG TABS Take 100 mcg by mouth daily       valACYclovir (VALTREX) 1000 mg tablet Take 1,000 mg by mouth daily as needed (at onset of cold sore)            Allergies   Allergies   Allergen Reactions     Prednisone      Nsaids Other (See Comments)        Physician Attestation   I, Samantha Noel, saw and evaluated this patient prior to discharge.  I discussed the patient with the resident/fellow and agree with plan of care as documented in the note.      I personally reviewed vital signs, medications, labs and imaging.    I personally spent 35 minutes on discharge activities.    Samantha Noel MD  Date of Service (when I saw the patient): 10/26/20

## 2020-10-27 ENCOUNTER — COMMITTEE REVIEW (OUTPATIENT)
Dept: TRANSPLANT | Facility: CLINIC | Age: 50
End: 2020-10-27

## 2020-10-27 NOTE — COMMITTEE REVIEW
Abdominal Committee Review Note     Evaluation Date: 10/18/2020  Committee Review Date: 10/27/2020    Organ being evaluated for: Liver    Transplant Phase: Evaluation  Transplant Status: Active    Transplant Coordinator: Daja Suarez  Transplant Surgeon:   Mil Tejada    Referring Physician: Rivera Dowling    Primary Diagnosis: Cirrhosis: Fatty Liver (Du)  Secondary Diagnosis:     Committee Review Members:  Hepatology Lacy Burnette MD   Nutrition Kelin Mcginnis, TRE   Pharmacist Onelia Soto, [facility]    - Clinical Dat Iglesias, RENARD, Juanita Wells, [facility]   Transplant Chimaobi Wai Paniagua MD, Shubham Pedersen MD, Raphael Cook MD, Jr Yves Briseno, AJAY, Che Burgess MD, Shelby Vicente, APRN CNP, Lacy Burgess MD, Pelon Scott MD, Alexei Alcaraz MD, Thomas M. Leventhal, MD, Mil Tejada MD, Bernice Dunham MD, MD       Transplant Eligibility: Cirrhosis with MELD, DU    Committee Review Decision: Approved    Relative Contraindications: None, approved pending outpatinet follow with GI    Absolute Contraindications: None    Committee Chair Lacy Burgess MD verbally attested to the committee's decision.    Committee Discussion Details:     Patient previously approved pending completion of evaluation and confirmation from hepatology (Dr. Cook)    Follow up as outpatient    Needs docusign confirmation

## 2020-10-27 NOTE — Clinical Note
J - please set up for 3 mo [place] follow up with labs before sometime in December or January. Thanks, tk

## 2020-10-27 NOTE — PROGRESS NOTES
[facility]: Post-Discharge Note  SITUATION                                                      Admission:    Admission Date: 10/25/20   Reason for Admission: Uncomplicated UTI  Discharge:   Discharge Date: 10/26/20  Discharge Diagnosis: Uncomplicated UTI    BACKGROUND                                                         Susan Puckett is a 50 year old female with a history of non-alcoholic steatohepatitis undergoing transplant evaluation, gastric bypass, DMII, and hypothyroidism, admitted on 10/25/2020 with myalgias, weakness and bilateral upper extremity tremor concerning for worsening hepatic encephalopathy.      ASSESSMENT      Discharge Assessment  Patient reports symptoms are: Improved  Does the patient have all of their medications?: Yes  Does patient know what their new medications are for?: Yes  Does patient have a follow-up appointment scheduled?: Yes  Does patient have any other questions or concerns?: Yes    Post-op  Did the patient have surgery or a procedure: No  Fever: No  Chills: No  Eating & Drinking: eating and drinking without complaints/concerns  PO Intake: regular diet  Bowel Function: loose stools  Urinary Status: voiding without complaint/concerns        PLAN                                                      Outpatient Plan:      Future Appointments   Date Time Provider Department Center   11/5/2020  1:00 PM Ozzie Augustin MD [facility]   11/11/2020 11:00 AM Sancho Gaines [facility]     Follow-up Appointments     Adult [facility] Follow-up and recommended labs and tests      Follow up with primary care provider, Harleen Billy as needed , for   hospital follow- up. No follow up labs or test are needed.      Follow up with Hepatology and your transplant team as established       Appointments on [place] and/or [facility] (with [facility] or [facility]   provider or service). Call [phone] if you haven't heard regarding   these appointments within 7 days of  discharge      Cherry Magaña, CMA

## 2020-10-28 ENCOUNTER — TELEPHONE (OUTPATIENT)
Dept: TRANSPLANT | Facility: CLINIC | Age: 50
End: 2020-10-28

## 2020-10-28 LAB
BACTERIA SPEC CULT: ABNORMAL
Lab: ABNORMAL
SPECIMEN SOURCE: ABNORMAL

## 2020-10-28 NOTE — TELEPHONE ENCOUNTER
Patient Call: General  Route to LPN    Reason for call: pt has general questions she wants to review with the coordinator  Daja talked about a manuel for liver transplants  Needs info again     Call back needed? Yes    Return Call Needed  Same as documented in contacts section  When to return call?: Greater than one day: Route standard priority

## 2020-10-29 ENCOUNTER — TELEPHONE (OUTPATIENT)
Dept: TRANSPLANT | Facility: CLINIC | Age: 50
End: 2020-10-29

## 2020-10-29 NOTE — TELEPHONE ENCOUNTER
Patient Call: General  Route to LPN    Reason for call: please connect with pt regarding paperwork     Call back needed? Yes    Return Call Needed  Same as documented in contacts section  When to return call?: Greater than one day: Route standard priority

## 2020-10-29 NOTE — TELEPHONE ENCOUNTER
Discussed and emailed:    - MTP   - donor info    - put in request for docusign to be resent    Patient reported  Feeling much better than earlier today.  She spoke with endocrine, and her BG is back down.

## 2020-11-02 ENCOUNTER — TELEPHONE (OUTPATIENT)
Dept: GASTROENTEROLOGY | Facility: CLINIC | Age: 50
End: 2020-11-02

## 2020-11-02 ENCOUNTER — TELEPHONE (OUTPATIENT)
Dept: TRANSPLANT | Facility: CLINIC | Age: 50
End: 2020-11-02

## 2020-11-03 ENCOUNTER — TELEPHONE (OUTPATIENT)
Dept: TRANSPLANT | Facility: CLINIC | Age: 50
End: 2020-11-03

## 2020-11-03 DIAGNOSIS — K74.60 CIRRHOSIS OF LIVER (H): Primary | ICD-10-CM

## 2020-11-03 NOTE — TELEPHONE ENCOUNTER
Spoke with Susan and reviewed plan to proceed with live donor work ups and after seen in clinic by hepatology, then assess listing. Request Dr. Santos to review CT for live donor option. Re entered labs to be done 12/9/20.

## 2020-11-04 ENCOUNTER — TELEPHONE (OUTPATIENT)
Dept: TRANSPLANT | Facility: CLINIC | Age: 50
End: 2020-11-04

## 2020-11-04 NOTE — LETTER
November 4, 2020     Susan Puckett   [address]  [phone]    Dear Susan,    Please proceed with any dental work recommended at this time. If there are questions or concerns, please contact me at [phone] or [phone].    Sincerely,    STEF RomeroN,RN  Adult Liver Coordinator  [phone]

## 2020-11-04 NOTE — TELEPHONE ENCOUNTER
Patient Call: Needs letter of OK faxed to her dental [facility]#[phone]  Letter stating OK for dental Extraction's       Please call Susan Italo #[phone]  Has dental appt Thursday, November 5th, 2020  Call back needed? Yes    Return Call Needed  Same as documented in contacts section  When to return call?: Same day: Route High Priority

## 2020-11-05 ENCOUNTER — TELEPHONE (OUTPATIENT)
Dept: TRANSPLANT | Facility: CLINIC | Age: 50
End: 2020-11-05

## 2020-11-05 ENCOUNTER — VIRTUAL VISIT (OUTPATIENT)
Dept: BEHAVIORAL HEALTH | Facility: CLINIC | Age: 50
End: 2020-11-05
Attending: INTERNAL MEDICINE
Payer: COMMERCIAL

## 2020-11-05 DIAGNOSIS — F41.1 GENERALIZED ANXIETY DISORDER: Primary | ICD-10-CM

## 2020-11-05 DIAGNOSIS — F33.1 MODERATE EPISODE OF RECURRENT MAJOR DEPRESSIVE DISORDER (H): ICD-10-CM

## 2020-11-05 PROCEDURE — 90792 PSYCH DIAG EVAL W/MED SRVCS: CPT | Mod: 95 | Performed by: PSYCHIATRY & NEUROLOGY

## 2020-11-05 RX ORDER — TRAZODONE HYDROCHLORIDE 100 MG/1
100 TABLET ORAL AT BEDTIME
Status: ON HOLD | COMMUNITY
Start: 2020-10-05 | End: 2022-02-21

## 2020-11-05 RX ORDER — VENLAFAXINE 75 MG/1
75 TABLET ORAL AT BEDTIME
Qty: 30 TABLET | Refills: 0 | Status: SHIPPED | OUTPATIENT
Start: 2020-11-05 | End: 2022-02-28

## 2020-11-05 RX ORDER — VENLAFAXINE HYDROCHLORIDE 75 MG/1
75 CAPSULE, EXTENDED RELEASE ORAL AT BEDTIME
COMMUNITY
End: 2020-11-05

## 2020-11-05 SDOH — SOCIAL STABILITY: SOCIAL NETWORK: HOW OFTEN DO YOU ATTEND CHURCH OR RELIGIOUS SERVICES?: MORE THAN 4 TIMES PER YEAR

## 2020-11-05 SDOH — HEALTH STABILITY: MENTAL HEALTH: HOW OFTEN DO YOU HAVE 6 OR MORE DRINKS ON ONE OCCASION?: NEVER

## 2020-11-05 SDOH — HEALTH STABILITY: MENTAL HEALTH: HOW OFTEN DO YOU HAVE A DRINK CONTAINING ALCOHOL?: NEVER

## 2020-11-05 SDOH — HEALTH STABILITY: PHYSICAL HEALTH: ON AVERAGE, HOW MANY MINUTES DO YOU ENGAGE IN EXERCISE AT THIS LEVEL?: 10 MIN

## 2020-11-05 SDOH — ECONOMIC STABILITY: INCOME INSECURITY: IN THE LAST 12 MONTHS, WAS THERE A TIME WHEN YOU WERE NOT ABLE TO PAY THE MORTGAGE OR RENT ON TIME?: NO

## 2020-11-05 SDOH — ECONOMIC STABILITY: FOOD INSECURITY: WITHIN THE PAST 12 MONTHS, YOU WORRIED THAT YOUR FOOD WOULD RUN OUT BEFORE YOU GOT MONEY TO BUY MORE.: NEVER TRUE

## 2020-11-05 SDOH — ECONOMIC STABILITY: TRANSPORTATION INSECURITY
IN THE PAST 12 MONTHS, HAS THE LACK OF TRANSPORTATION KEPT YOU FROM MEDICAL APPOINTMENTS OR FROM GETTING MEDICATIONS?: NO

## 2020-11-05 SDOH — ECONOMIC STABILITY: TRANSPORTATION INSECURITY
IN THE PAST 12 MONTHS, HAS LACK OF TRANSPORTATION KEPT YOU FROM MEETINGS, WORK, OR FROM GETTING THINGS NEEDED FOR DAILY LIVING?: NO

## 2020-11-05 SDOH — HEALTH STABILITY: PHYSICAL HEALTH: ON AVERAGE, HOW MANY DAYS PER WEEK DO YOU ENGAGE IN MODERATE TO STRENUOUS EXERCISE (LIKE A BRISK WALK)?: 1 DAY

## 2020-11-05 SDOH — SOCIAL STABILITY: SOCIAL NETWORK
IN A TYPICAL WEEK, HOW MANY TIMES DO YOU TALK ON THE PHONE WITH FAMILY, FRIENDS, OR NEIGHBORS?: MORE THAN THREE TIMES A WEEK

## 2020-11-05 SDOH — HEALTH STABILITY: MENTAL HEALTH
STRESS IS WHEN SOMEONE FEELS TENSE, NERVOUS, ANXIOUS, OR CAN'T SLEEP AT NIGHT BECAUSE THEIR MIND IS TROUBLED. HOW STRESSED ARE YOU?: TO SOME EXTENT

## 2020-11-05 SDOH — SOCIAL STABILITY: SOCIAL NETWORK
DO YOU BELONG TO ANY CLUBS OR ORGANIZATIONS SUCH AS CHURCH GROUPS UNIONS, FRATERNAL OR ATHLETIC GROUPS, OR SCHOOL GROUPS?: NO

## 2020-11-05 SDOH — ECONOMIC STABILITY: FOOD INSECURITY: WITHIN THE PAST 12 MONTHS, THE FOOD YOU BOUGHT JUST DIDN'T LAST AND YOU DIDN'T HAVE MONEY TO GET MORE.: NEVER TRUE

## 2020-11-05 SDOH — SOCIAL STABILITY: SOCIAL NETWORK: HOW OFTEN DO YOU GET TOGETHER WITH FRIENDS OR RELATIVES?: ONCE A WEEK

## 2020-11-05 SDOH — SOCIAL STABILITY: SOCIAL NETWORK: ARE YOU MARRIED, WIDOWED, DIVORCED, SEPARATED, NEVER MARRIED, OR LIVING WITH A PARTNER?: MARRIED

## 2020-11-05 SDOH — SOCIAL STABILITY: SOCIAL NETWORK: HOW OFTEN DO YOU ATTENT MEETINGS OF THE CLUB OR ORGANIZATION YOU BELONG TO?: NEVER

## 2020-11-05 ASSESSMENT — ANXIETY QUESTIONNAIRES
6. BECOMING EASILY ANNOYED OR IRRITABLE: SEVERAL DAYS
4. TROUBLE RELAXING: MORE THAN HALF THE DAYS
5. BEING SO RESTLESS THAT IT IS HARD TO SIT STILL: SEVERAL DAYS
7. FEELING AFRAID AS IF SOMETHING AWFUL MIGHT HAPPEN: NOT AT ALL
3. WORRYING TOO MUCH ABOUT DIFFERENT THINGS: MORE THAN HALF THE DAYS
GAD7 TOTAL SCORE: 10
1. FEELING NERVOUS, ANXIOUS, OR ON EDGE: MORE THAN HALF THE DAYS
2. NOT BEING ABLE TO STOP OR CONTROL WORRYING: MORE THAN HALF THE DAYS
GAD7 TOTAL SCORE: 10
GAD7 TOTAL SCORE: 10
7. FEELING AFRAID AS IF SOMETHING AWFUL MIGHT HAPPEN: NOT AT ALL

## 2020-11-05 ASSESSMENT — PATIENT HEALTH QUESTIONNAIRE - PHQ9
SUM OF ALL RESPONSES TO PHQ QUESTIONS 1-9: 10
SUM OF ALL RESPONSES TO PHQ QUESTIONS 1-9: 10
10. IF YOU CHECKED OFF ANY PROBLEMS, HOW DIFFICULT HAVE THESE PROBLEMS MADE IT FOR YOU TO DO YOUR WORK, TAKE CARE OF THINGS AT HOME, OR GET ALONG WITH OTHER PEOPLE: SOMEWHAT DIFFICULT

## 2020-11-05 NOTE — TELEPHONE ENCOUNTER
This following information was provided to Susan and her spouse via email    [facility] (assist with medical bills): [facility]: [phone]. [place] [phone]  If your medical bills are through a different health system, I would encourage you to call to see if they have a similar program.      Living Donor website: [employer], Living donor phone number [phone], transplant office: [phone]     Social security information: https://www.ssa.gov/disability/disability.html    She inquired about manuel assistance. This writer indicated that she would like to look into long-term plan for finances, as she would like her to be successful long term. This writer reiterated that manuel assistance needs to be applied to a specific need within the scope of assistance. Not only it is a specific need, it is a one time use and can no longer be uses. This writer offered additional support in long term planning.     RENARD Coronado, [facility]  Liver Transplant   [facility] [place]  Phone: [phone]  Pager: [phone]

## 2020-11-05 NOTE — PATIENT INSTRUCTIONS
Scheduling: If you have any concerns about today's visit or wish to schedule another appointment please call our office during normal business hours [phone] (8-5:00 M-F)    Contact Us: Please call [phone] during business hours (8-5:00 M-F).  If after clinic hours, or on the weekend, please call  [phone].    Financial Assistance [phone]  [employer] Billing [phone]  [place] Billing [phone]  Medical Records [phone]  [place] Patient Bill of Rights    MENTAL HEALTH CRISIS NUMBERS:  [facility]:  [facility] - [phone]  [facility] Residence [facility] - [phone]  Walk-In Counseling [facility] [phone]  COPE 24/7 [place] Mobile Team - [[phone]]    [facility]:  [facility] - [phone]  Walk-in counseling [facility] - [phone]  Walk-in counseling [facility] - [phone]  [facility] Residence [facility] - [phone]  Urgent Care Adult Mental Health:   --Drop-in, 24/7 crisis line, and [facility] Mobile Team [[phone]]    24/7 CRISIS TEXT LINE: www.crisistextline.org OR text 830-965 anywhere, anytime, any crisis    Poison Control Center - [phone]  [facility] [employer] - [phone]; or [employer] - [phone]    If you have a medical emergency please call 911 or go to the nearest ER.

## 2020-11-05 NOTE — PROGRESS NOTES
Telehealth Details  Type of service:  video visit for consult  Date of service: Nov 5, 2020  Time of service:    Start Time:  1:04 PM    End Time:  2:10 PM    Reason for video visit:  Services only offered telehealth  Originating Site (patient location):  Patient's home  Distant Site (provider location):  [facility]  Mode of Communication:  Video conference via [employer]  The patient consents to receiving services via telehealth.        Psychiatric Telehealth Medication Management Consultation   Susan Puckett is a 50 year old. Initial consultation on 11/05/20. Referred by Raphael Cook for evaluation of anxiety.   History was provided by patient who was a good historian.       Chief Complaint     Would like to find better ways to use meds to address depression and anxiety during the day.      Assessment & Plan    Susan Puckett is a 50 year old with history supporting the following diagnoses:  Generalized anxiety disorder  Major depressive disorder, recurrent, moderate    Pertinent History: Susan notes history of CUETO, obesity s/p gastric bypass (2006), hypothyroidism, and T2DM and mood symptoms consistent with generalized anxiety disorder and major depressive disorder first noted around 2014. She has previously used Prozac in 2014, but was discontinued due to side effects.   TODAY: More recently, depression and anxiety symptoms have been more significant, noting anxiety episodes with tremors and tachycardia now occurring up to 6-8 times a week.   Susan reports use of venlafaxine XR since earlier this summer with some improvement in anxiety and depression. She describes her anxiety as stress-induced largely centered around her health concerns, but notes generalized anxiety preceded her current situation.   Patient is on trazodone for sleep, which she reports helping with falling asleep but complains of early awakenings occurring about 4 times per week. She currently gets 5-7 hours of sleep per night, but  requires naps throughout the day due to exhaustion.  Psychotherapy: Primary recommendation for the treatment of mood symptoms, especially anxiety. Supportive therapy can be useful in the presence of chronic psychosocial stressors. CBT can be particularly helpful for ruminative thinking and addressing maladaptive coping mechanisms that may worsen anxiety.   Pharmacotherapy:   Mood meds  Given that she has found venlafaxine to be beneficial, I recommended switching to immediate release (IR) formulation at this time. Extended release formulations with medications may be poorly absorbed in patients with history of gastric bypass. She is hesitant to add a second daily dose, given that she has found it to be sedating, but traditionally, the next step would be to increase the dose to twice daily dosing. 150mg is the max dose for hepatic impairment. If this doesn't work, or if she is unwilling to tolerate it, would try escitalopram. Max dose for escitalopram in hepatic impairment is 10mg. Recheck QTc at least every 6 months on this or if any other QT prolonging meds are added.   Pristiq is a good option with hepatic impairment, as no dose change is required, although it only comes as extended release, which may not be as ideal with history of gastric bypass.   Sleep meds  Trazodone seems to be working, well, already at max dose for hepatic impairment. No change indicated necessarily, although if it isn't sufficient, could try other options.   Hydroxyzine may be a good alternative for sleep, as it can also help with issues like itching / pain due to cirrhosis. Would limit dose to 50-75mg per day, 100mg at most. Check QTc.   Gabapentin may also be an option to consider for sleep / anxiety, also does not require dose change for hepatic impairment. Would start with 100-300mg twice daily as needed. This med is not QTc prolonging.   Also could consider low dose clonidine (0.05-0.1mg to start) to address nighttime autonomic  hyperarousal. Would need to reiterate the orthostasis risk, as she does have some of this at baseline. We did discuss this today.     [place] PRESCRIPTION MONITORING PROGRAM [] was checked today: not using controlled substances    PSYCHOTROPIC DRUG INTERACTIONS: **Italicized interactions are for treatment plan options not currently implemented**  VENLAFAXINE -- TRAZODONE -- ESCITALOPRAM -- DESVENLAFAXINE -- SUMATRIPTAN -- ONDANSETRON may result in increased risk of serotonin syndrome (hypertension, tachycardia, hyperthermia, myoclonus, mental status changes).    VENLAFAXINE -- TRAZODONE - ESCITALOPRAM -- HYDROXYZINE -- ONDANSETRON may result in increased risk of QT-interval prolongation.    ESCITALOPRAM -- OMEPRAZOLE may result in increased escitalopram exposure.    TRAZODONE -- GABAPENTIN -- HYDROXYZINE may result in respiratory depression.     MANAGEMENT:  Monitoring for adverse effects and routine vitals     Plan     1) PSYCHOTROPIC MEDICATION RECOMMENDATIONS: **Hepatic impairment - S/p gastric bypass**  - Switch to venlafaxine IR formulation, keep dose at 75mg at bedtime    [see the following SmartPhrase(s) for more information: PSYMEDINFOVENLAFAXINE]    2) THERAPY: Psychotherapy is a primary recommendation. Set up to see Sancho Gaines with SOT on 11/11.     3) NEXT DUE:   Labs- Routine monitoring is not indicated for current psychotropic medication regimen   ECG- Given borderline high QTc, would recommend rechecks if any QTc prolonging medications are added or increased.   Rating Scales- N/A    4) REFERRALS: None    5) : None    6) DISPOSITION:   - Pt is currently psychiatrically stable, but may benefit from medication adjustment. Follow up with designated prescriber.   - Prescriber should reference the recommendations above and implement as they deem appropriate.   - If further recommendations are desired or if clarification is needed, prescriber should contact Ozzie Augustin MD via staff message  for further curbside / chart review consultation    Treatment Risk Statement:  The patient understands the risks, benefits, adverse effects and alternatives. Agrees to treatment with the capacity to do so. No medical contraindications to treatment. Agrees to call clinic for any problems. The patient understands to call 911 or go to the nearest ED if life threatening or urgent symptoms occur. Crisis numbers are provided routinely in the After Visit Summary.       PROVIDER:  Ozzie Augustin MD       History of Present Illness    Susan presents with concern for anxiety, depression, and poor sleep. She has been on venlafaxine since earlier this summer and trazodone which she feels helps her sleep, but doesn't get her to stay asleep very well. Notes that nighttime wakening happens probably 4 nights per week, only gets 5-7 hours of sleep per night, feels that she really needs 8. Does take a lot of naps during the day due to exhaustion.   She has 6-8 episodes per week of more severe anxiety, which she describes as shakiness and heart racing. Episodes last from 5-15 minutes or so and can be triggered by stress. She does listen to relaxation apps to help with sleep as well.   Notes that anxiety has been a long term issue for her for at least 4 years. Even before her health was poor, she would worry quite a bit about many things, like her kids, or her job, and it's just much worse since her health has worsened. She was previously started on Prozac in 2014 but was discontinued due to side effects.   Susan describes her depression as centered around her health. She doesn't really feel hopeless, but often feels down. Notes that she sometimes feels a tendency to focus on what ifs, like what could she have done to prevent her medical situation, but is pretty able to focus on the positive, like her supportive people and her care team. She notes an emotional traumatic history regarding her infertility. She had previously attempted  multiple methods of pregnancy including IVF without success. She describes still being impacted by this issue, but has felt at peace with her role as mother of her 's two children and sister's children. She currently denies nightmares and flashbacks.   Denies any hx of manic symptoms.   Does note emotional eating pattern at times at times.     Recent Substance Use  Alcohol- None  Nicotine- None  Caffeine- no caffeine  Opioids- None  Narcan Kit- N/A  THC- None  Other Illicit Drugs- none    Current Social Hx:  FINANCIAL SUPPORT- Has been on medical leave since 7/6/20. 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   LIVING SITUATION / RELATIONSHIPS- Lives in house with  and dog.    SOCIAL/ SPIRITUAL SUPPORT- Talks with mother every day.  and mother in law are very supportive. Her children and work are supportive as well.      Medical Review of Systems    Dizziness/orthostasis- Orthostasis ~5x per week. Generally not severe, but has been worse at times. Has not fallen, but almost has. Associates this with her liver disease.   Headaches- Constant headaches. Has had migraines in the past, maybe 3x in the last 4 months. Currently on sumatriptan for migraines.  GI- Frequent nausea, gag reflex. Diarrhea from lactulose.   Sexual health concerns? None  A comprehensive review of systems was performed and is negative other than noted in the HPI.     Past Psychiatric History    SIB [method, most recent]- None  Suicide Attempt [#, recent, method]- None  Suicidal Ideation Hx [passive, active]- None    Psychosis Hx- None  Psych Hosp [ #, most recent, committed]- None  ECT/TMS [#, most recent]- None    Eating Disorder- None    Outpatient Programs [Day Treatment, DBT, Eating Disorder Tx etc]- None     Psychiatric  Medication Trials       Drug / Start Date Dose (mg) Helpful Adverse Effects DC Reason / Other   Prozac ~2014 20 no Felt weird    Venlafaxine 2020 77 HS yes  Helpful for sleep   Hydroxyzine 2020       Benadryl  yes  For sleep   Trazodone 2020 100 yes     melatonin 3 ?        Social History                [per patient report]   Financial Support- See above  Living Situation/Family/Relationships- See above. Her  had two children that she feels as if are her own. Also helped raise her sister's kids after her sister passed away at age 38.   Feels safe at home- Yes  Social/Spiritual Support- See above  Legal- None  Trauma History (self-report)- Fought infertility for years, with multiple failed pregnancies, and found this to be very distressing and emotionally taxing for years. Denies any related trauma symptoms  Early History/Education- Moved a lot growing up, very close family. Has one brother 6 years older. Older sister passed away at age 38. Has bachelor degree in education and family studies.     Family History - Doesn't note any significant family history of mental health issues. Sister passed away related to diabetes.      Past Medical History      CARE TEAM:   PCP- Harleen Billy at [facility]  Therapist- Was previously seeing therapist at [facility], off and on for ~4 years, supportive therapy.   SOT- Raphael Cook  GI- Rivera Dowling with [facility]  Endo- [facility]    Neurologic Hx [head injury, seizures, etc.]: One seizure at age 15, cause unknown. Mom has had seizures, is not able to drive.   Patient Active Problem List   Diagnosis     Acute kidney injury (nontraumatic) (H)     Anxiety     Ascites     Benign tumor of colon     Brow ptosis, bilateral     Chronic diarrhea     Chronic peritoneal effusion     Colitis     Endometriosis     Hepatic encephalopathy (H)     History of gastric bypass     Insomnia     Hypothyroidism     HSV-1 (herpes simplex virus 1) infection     Iron  deficiency anemia     Lactate blood increase     Raynaud phenomenon     Ptosis of eyelid     Pleural effusion associated with hepatic disorder     Hydrothorax     Pancytopenia (H)     Other headache syndrome     Liver cirrhosis secondary to CUETO (H)     Non-alcoholic cirrhosis (H)     Nausea     Moderate dehydration     Major depressive disorder, recurrent episode, moderate (H)     Infertility management     History of gastric ulcer     Allergic rhinitis     Tubular adenoma of colon     Vitamin D deficiency     Visual field defect     Type 2 diabetes mellitus without complication, without long-term current use of insulin (H)     Primary hypothyroidism     H/O gastric bypass     CUETO (nonalcoholic steatohepatitis)     Past Medical History:   Diagnosis Date     Depressive disorder      Diabetes (H)     Type 2 DM/No Insulin      History of blood transfusion     10/2019     Liver cirrhosis secondary to CUETO (H) 5/28/2020     Thyroid disease       Allergies    Prednisone and Nsaids     Medications      Current Outpatient Medications   Medication Sig Dispense Refill     atorvastatin (LIPITOR) 20 MG tablet Take 20 mg by mouth daily        calcium carbonate (TUMS) 500 MG chewable tablet Take 1 tablet by mouth daily as needed for heartburn        calcium citrate-vitamin D (CITRACAL W/D) 250-100 MG-UNIT tablet Take 2 tablets by mouth 2 times daily (with meals) 336 tablet 3     cholecalciferol (VITAMIN D-1000 MAX ST) 25 MCG (1000 UT) TABS Take 4,000 Units by mouth       cyanocobalamin (VITAMIN B-12) 1000 MCG tablet Take 1,000 mcg by mouth daily        Ferrous Sulfate (IRON) 325 (65 FE) MG tablet Take 1 tablet by mouth daily       folic acid (FOLVITE) 1 MG tablet Take 1 mg by mouth daily        furosemide (LASIX) 40 MG tablet Take 40 mg by mouth daily        hydrOXYzine (ATARAX) 25 MG tablet Take 25 mg by mouth every evening        lactulose (CHRONULAC) 10 GM/15ML solution Take 20 g by mouth 2 times daily        levothyroxine  (SYNTHROID) 175 MCG tablet Take 175 mcg by mouth every day except for Monday and Thursday       metFORMIN (GLUCOPHAGE) 1000 MG tablet Take 1,000 mg by mouth 2 times daily (with meals)        multivitamin (DEKAS ESSENTIAL) capsule Take 1 capsule by mouth daily        omeprazole (PRILOSEC) 20 MG DR capsule Take 20 mg by mouth daily        ondansetron (ZOFRAN-ODT) 4 MG ODT tab Place 4 mg under the tongue every 6 hours as needed for nausea        rifaximin (XIFAXAN) 550 MG TABS tablet Take 550 mg by mouth 2 times daily        spironolactone (ALDACTONE) 100 MG tablet Take 200 mg by mouth daily        SUMAtriptan (IMITREX) 50 MG tablet Take 50 mg by mouth at onset of headache for migraine        traZODone (DESYREL) 100 MG tablet Take 100 mg by mouth At Bedtime        valACYclovir (VALTREX) 1000 mg tablet Take 1,000 mg by mouth daily as needed (at onset of cold sore)        vitamin A 3 MG (40233 UNITS) capsule Take 10,000 Units by mouth daily        vitamin C (ASCORBIC ACID) 100 MG tablet Take 100 mg by mouth daily        vitamin D2 (ERGOCALCIFEROL) 11073 units (1250 mcg) capsule Take 1 capsule (50,000 Units) by mouth once a week for 10 doses 10 capsule 0     vitamin E (TOCOPHEROL) 400 units (180 mg) capsule Take 400 Units by mouth daily        Vitamin K, Phytonadione, 100 MCG TABS Take 100 mcg by mouth daily         Physical Exam  (Vitals Only)   There were no vitals taken for this visit.    Pulse Readings from Last 5 Encounters:   10/26/20 87   10/20/20 94   07/24/14 75     Wt Readings from Last 5 Encounters:   10/25/20 60.5 kg (133 lb 6.4 oz)   10/20/20 57.5 kg (126 lb 11.2 oz)   09/25/20 60.8 kg (134 lb)   07/24/14 79.8 kg (176 lb)     BP Readings from Last 5 Encounters:   10/26/20 108/74   10/20/20 125/87   07/24/14 (!) 146/98      Mental Status Exam   Alertness: alert  and oriented  Appearance: adequately groomed  Behavior/Demeanor: cooperative, pleasant and calm, with good eye contact   Speech: normal and regular  rate and rhythm  Language: intact and no problems  Psychomotor: normal or unremarkable  Mood: anxious  Affect: full range and appropriate; was congruent to mood  Thought Process/Associations: unremarkable  Thought Content:  Reports none;  Denies suicidal ideation, violent ideation and delusions  Perception:  Reports none;  Denies auditory hallucinations and visual hallucinations  Insight: intact  Judgment: intact  Cognition: does  appear grossly intact; formal cognitive testing was not done  Gait and Station: not observed     Data      CAGE-AID Total Score 11/5/2020   Total Score 0   Total Score MyChart 0 (A total score of 2 or greater is considered clinically significant)     PHQ-9 SCORE 11/5/2020   PHQ-9 Total Score MyChart 10 (Moderate depression)   PHQ-9 Total Score 10     AILIN-7 SCORE 11/5/2020   Total Score 10 (moderate anxiety)   Total Score 10       Recent Labs   Lab Test 10/26/20  0717 10/25/20  0216   CR 0.86 0.90   GFRESTIMATED 80 75     Recent Labs   Lab Test 10/25/20  0216   AST 74*   ALT 50   ALKPHOS 105     ECG 10/20/20 QTc = 460ms

## 2020-11-05 NOTE — NURSING NOTE
"Chief Complaint   Patient presents with     Consult     anxiety       Would like out of today's visit:    Discuss current treatment for anxiety. She takes all of her medications at night leaving her daytime anxiety uncontrolled.    Marni Pinto LPN     VIDEO VISIT  Susan Puckett is a 50 year old patient who is being evaluated via a billable video visit.      The patient has been notified of following:   \"This video visit will be conducted via a call between you and your physician/provider. We have found that certain health care needs can be provided without the need for an in-person physical exam. This service lets us provide the care you need with a video conversation. If a prescription is necessary we can send it directly to your pharmacy. If lab work is needed we can place an order for that and you can then stop by our lab to have the test done at a later time. Insurers are generally covering virtual visits as they would in-office visits so billing should not be different than normal.  If for some reason you do get billed incorrectly, you should contact the billing office to correct it and that number is in the AVS .    Video Conference to be completed via:  Brisa    Patient has given verbal consent for video visit?:  Yes    Patient would prefer that any video invitations be sent by: Text to cell phone: [phone]      How would patient like to obtain AVS?:  [employer]    AVS SmartPhrase [PsychAVS] has been placed in 'Patient Instructions':  Yes    "

## 2020-11-06 ASSESSMENT — PATIENT HEALTH QUESTIONNAIRE - PHQ9: SUM OF ALL RESPONSES TO PHQ QUESTIONS 1-9: 10

## 2020-11-06 ASSESSMENT — ANXIETY QUESTIONNAIRES: GAD7 TOTAL SCORE: 10

## 2020-11-06 NOTE — TELEPHONE ENCOUNTER
This writer called and left a voice message for Susan as she requested additional information regarding assistance. This writer indicated she is happy to discuss and provided call back number.

## 2020-11-06 NOTE — TELEPHONE ENCOUNTER
This writer received a call from Susan and her spouse. They reported stress related to their finances. They indicated that their mortgage ($1281), utilities, phone bill, car payment ($275), credit card and loan payment ($330) are all stressors. Susan and spouse voiced understanding regarding manuel assistance and agreed that it would be best to look at long term plans for these finances. This writer indicated she will look into various programs to assist family.     Plan: Send resources via email to Susan and spouse

## 2020-11-10 ENCOUNTER — TELEPHONE (OUTPATIENT)
Dept: TRANSPLANT | Facility: CLINIC | Age: 50
End: 2020-11-10

## 2020-11-10 NOTE — TELEPHONE ENCOUNTER
This writer sent e-mail to Susan and her spouse providing information regarding assistance such as rent, utilities, etc through [facility].

## 2020-11-11 ENCOUNTER — VIRTUAL VISIT (OUTPATIENT)
Dept: BEHAVIORAL HEALTH | Facility: CLINIC | Age: 50
End: 2020-11-11
Payer: COMMERCIAL

## 2020-11-11 DIAGNOSIS — F41.1 GENERALIZED ANXIETY DISORDER: ICD-10-CM

## 2020-11-11 DIAGNOSIS — F33.1 MAJOR DEPRESSIVE DISORDER, RECURRENT EPISODE, MODERATE (H): Primary | ICD-10-CM

## 2020-11-11 PROCEDURE — 90834 PSYTX W PT 45 MINUTES: CPT | Mod: 95 | Performed by: PSYCHOLOGIST

## 2020-11-11 NOTE — PROGRESS NOTES
"[facility]: Integrated Behavioral Health  November 11, 2020      Behavioral Health Clinician Progress Note    Patient Name: Susan Puckett           Service Type: Individual      Service Location:  Phone visit      Session Start Time: 11:03  Session End Time: 11:51      Session Length: 38 - 52      Attendees: Patient    Visit Activities (Refresh list every visit): NEW and Phone Encounter    Susan Puckett is a 50 year old female who is being evaluated via a telephone visit.      The patient has been notified of the following:     \"We have found that certain health care needs can be provided without the need for a face to face visit.  This service lets us provide the care you need with a short phone conversation.      I will have full access to your [place] [facility] medical record during this entire phone call.   I will be taking notes for your medical record.     Since this is like an office visit, we will bill your insurance company for this service.  Please check with your medical insurance if this type of telephone visit/virtual care is covered.  You may be responsible for the cost of this service if insurance coverage is denied.      There are potential benefits and risks of telephone visits (e.g. limits to patient confidentiality) that differ from in-person visits.?  Confidentiality still applies for telephone services, and nobody will record the visit.  It is important to be in a quiet, private space that is free of distractions (including cell phone or other devices) during the visit.??     If during the course of the call I believe a telephone visit is not appropriate, you will not be charged for this service\"    Consent has been obtained for this service by care team member: yes.      Diagnostic Assessment Date: completed by Ozzie Augustin MD on 11/5  Treatment Plan Review Date: IP  See Flowsheets for today's PHQ-9 and AILIN-7 results  Previous PHQ-9:   PHQ-9 SCORE 11/5/2020   PHQ-9 " Total Score MyChart 10 (Moderate depression)   PHQ-9 Total Score 10     Previous AILIN-7:   AILIN-7 SCORE 11/5/2020   Total Score 10 (moderate anxiety)   Total Score 10       LUIS LEVEL:  LUIS Score (Last Two) 10/18/2020   LUIS Raw Score 35   Activation Score 72.1   LUIS Level 3       DATA  Extended Session (60+ minutes): No  Interactive Complexity: No  Crisis: No    Treatment Objective(s) Addressed in This Session:  Target Behavior(s): disease management/lifestyle changes related to anxiety management    Anxiety: will experience a reduction in anxiety, will develop more effective coping skills to manage anxiety symptoms, will develop healthy cognitive patterns and beliefs and will increase ability to function adaptively    Current Stressors / Issues:  Susan is a pleasant and engaging 50-year-old, , female who presents today for an initial [facility] appointment. Our goals today included reviewing her presenting concerns, establishing rapport, review treatment options, and start cognitive-behavioral (CBT) skills to help her with her concerns.     Susan cites struggling with anxiety and depressed mood since about Spring of 2020. Around this time, she began the liver transplant process and shared she found out about having CUETO in an unpleasant way from her physician at the time. Susan described her struggles in detail, noting she is contending with finding someone for a live donation (which she dislikes asking for help) and feeling the need to in order to live. In response to this, Susan feels her mood feels guilty, down/depressed, anxious, helpless, and fearful. She estimated that her anxiety spikes about four days per week and can be difficult to manage on her own.    The COVID-19 pandemic has also been stressful for Susan. She reports not being as able to engage with her support system because of her health and how this has affected her appointments. She additionally has been off work since July and while work was stressful at  times (she worked as a care  for a school), she misses having more structured routines and daily obligations. Guilt from changes to her financial status (she is now on medical leave and disability) was also discussed today. I suspect her difficulties seem related to major depression and generalized anxiety.     We talked some about her referral to me for CBT training. I explained what CBT entails in therapy and the rationale behind why it can be helpful for depression and anxiety management. Susan agreed to start trying CBT skills for homework between now and next time we meet. I assigned her worry and thought logs to help with her anxious distress. I explained how to use worry-time and delayed-worry as additional strategies to help with her distress.     Supportive counseling, psychoeducation, and CBT skills were emphasized today.     We agreed to meet again in a couple weeks.     Progress on Treatment Objective(s) / Homework:  New Objective established this session - PREPARATION (Decided to change - considering how); Intervened by negotiating a change plan and determining options / strategies for behavior change, identifying triggers, exploring social supports, and working towards setting a date to begin behavior change    Motivational Interviewing    MI Intervention: Expressed Empathy/Understanding, Supported Autonomy, Collaboration, Evocation, Permission to raise concern or advise, Open-ended questions, Reflections: simple and complex, Change talk (evoked) and Reframe     Change Talk Expressed by the Patient: Desire to change    Provider Response to Change Talk: E - Evoked more info from patient about behavior change, A - Affirmed patient's thoughts, decisions, or attempts at behavior change, R - Reflected patient's change talk and S - Summarized patient's change talk statements    Also provided psychoeducation about behavioral health condition, symptoms, and treatment options    CBT Skills  assigned today - Thought/worry logs reviewed, worry-time, delayed worry techniques    Care Plan review completed: Yes    Medication Review:  No changes to current psychiatric medication(s)    Medication Compliance:  Yes    Changes in Health Issues:   Yes: Chronic disease management, Associated Psychological Distress    Chemical Use Review:   Substance Use: Chemical use reviewed, no active concerns identified      Tobacco Use: No current tobacco use.      Assessment: Current Emotional / Mental Status (status of significant symptoms):  Risk status (Self / Other harm or suicidal ideation)  Patient denies a history of suicidal ideation, suicide attempts, self-injurious behavior, homicidal ideation, homicidal behavior and and other safety concerns     Patient denies current fears or concerns for personal safety.  Patient denies current or recent suicidal ideation or behaviors.  Patient denies current or recent homicidal ideation or behaviors.  Patient denies current or recent self injurious behavior or ideation.  Patient denies other safety concerns.     [place] Suicide Severity Rating Scale (Short Version)  [place] Suicide Severity Rating (Short Version) 10/25/2020   Over the past 2 weeks have you felt down, depressed, or hopeless? no   Over the past 2 weeks have you had thoughts of killing yourself? no   Have you ever attempted to kill yourself? no       A safety and risk management plan has not been developed at this time, however patient was encouraged to call [place] [phone] should there be a change in any of these risk factors.    Appearance:   Appropriate   Eye Contact:   Good   Psychomotor Behavior: Normal   Attitude:   Cooperative  Friendly Pleasant  Orientation:   All  Speech   Rate / Production: Normal    Volume:  Normal   Mood:    Sad   Affect:    Appropriate   Thought Content:  Clear   Thought Form:  Coherent  Logical   Insight:    Good     Diagnoses:  1. Major depressive disorder, recurrent episode,  moderate (H)    2. Generalized anxiety disorder        Collateral Reports Completed:  Not Applicable    Plan: (Homework, other):  Patient was given information about behavioral services and encouraged to schedule a follow up appointment with the clinic [facility] in 3 weeks.  She was also given information about mental health symptoms and treatment options  and Cognitive Behavioral Therapy skills to practice when experiencing anxiety.  CD Recommendations: No indications of CD issues. Sancho Gaines, LP

## 2020-11-11 NOTE — TELEPHONE ENCOUNTER
Reviewed the age guidelines with donors. Susan openly discussed the challenges of liver disease, compounded with Covid concerns.

## 2020-11-22 ENCOUNTER — APPOINTMENT (OUTPATIENT)
Dept: CT IMAGING | Facility: CLINIC | Age: 50
DRG: 441 | End: 2020-11-22
Attending: EMERGENCY MEDICINE
Payer: COMMERCIAL

## 2020-11-22 ENCOUNTER — TELEPHONE (OUTPATIENT)
Dept: TRANSPLANT | Facility: CLINIC | Age: 50
End: 2020-11-22

## 2020-11-22 ENCOUNTER — HOSPITAL ENCOUNTER (INPATIENT)
Facility: CLINIC | Age: 50
LOS: 3 days | Discharge: HOME HEALTH | DRG: 441 | End: 2020-11-25
Attending: EMERGENCY MEDICINE | Admitting: INTERNAL MEDICINE
Payer: COMMERCIAL

## 2020-11-22 ENCOUNTER — APPOINTMENT (OUTPATIENT)
Dept: GENERAL RADIOLOGY | Facility: CLINIC | Age: 50
DRG: 441 | End: 2020-11-22
Attending: EMERGENCY MEDICINE
Payer: COMMERCIAL

## 2020-11-22 DIAGNOSIS — R11.0 NAUSEA: Primary | ICD-10-CM

## 2020-11-22 DIAGNOSIS — K76.82 HEPATIC ENCEPHALOPATHY (H): ICD-10-CM

## 2020-11-22 DIAGNOSIS — Z20.822 EXPOSURE TO COVID-19 VIRUS: ICD-10-CM

## 2020-11-22 DIAGNOSIS — K75.81 NASH (NONALCOHOLIC STEATOHEPATITIS): ICD-10-CM

## 2020-11-22 DIAGNOSIS — R53.1 WEAKNESS GENERALIZED: ICD-10-CM

## 2020-11-22 LAB
ALBUMIN SERPL-MCNC: 2.7 G/DL (ref 3.4–5)
ALBUMIN UR-MCNC: NEGATIVE MG/DL
ALP SERPL-CCNC: 137 U/L (ref 40–150)
ALT SERPL W P-5'-P-CCNC: 32 U/L (ref 0–50)
AMMONIA PLAS-SCNC: 122 UMOL/L (ref 10–50)
ANION GAP SERPL CALCULATED.3IONS-SCNC: 4 MMOL/L (ref 3–14)
APPEARANCE UR: CLEAR
APTT PPP: 32 SEC (ref 22–37)
AST SERPL W P-5'-P-CCNC: 42 U/L (ref 0–45)
BASOPHILS # BLD AUTO: 0 10E9/L (ref 0–0.2)
BASOPHILS NFR BLD AUTO: 0.4 %
BILIRUB SERPL-MCNC: 0.8 MG/DL (ref 0.2–1.3)
BILIRUB UR QL STRIP: NEGATIVE
BUN SERPL-MCNC: 19 MG/DL (ref 7–30)
CALCIUM SERPL-MCNC: 8.7 MG/DL (ref 8.5–10.1)
CHLORIDE SERPL-SCNC: 106 MMOL/L (ref 94–109)
CO2 SERPL-SCNC: 28 MMOL/L (ref 20–32)
COLOR UR AUTO: 1.01
CREAT SERPL-MCNC: 0.95 MG/DL (ref 0.52–1.04)
CRP SERPL-MCNC: 3.7 MG/L (ref 0–8)
DIFFERENTIAL METHOD BLD: ABNORMAL
EOSINOPHIL # BLD AUTO: 0.1 10E9/L (ref 0–0.7)
EOSINOPHIL NFR BLD AUTO: 1 %
ERYTHROCYTE [DISTWIDTH] IN BLOOD BY AUTOMATED COUNT: 14.9 % (ref 10–15)
GFR SERPL CREATININE-BSD FRML MDRD: 70 ML/MIN/{1.73_M2}
GLUCOSE SERPL-MCNC: 379 MG/DL (ref 70–99)
GLUCOSE UR STRIP-MCNC: 300 MG/DL
HCG SERPL QL: NEGATIVE
HCT VFR BLD AUTO: 34.1 % (ref 35–47)
HGB BLD-MCNC: 11.1 G/DL (ref 11.7–15.7)
HGB UR QL STRIP: NEGATIVE
IMM GRANULOCYTES # BLD: 0 10E9/L (ref 0–0.4)
IMM GRANULOCYTES NFR BLD: 0.4 %
INR PPP: 1.17 (ref 0.86–1.14)
INTERPRETATION ECG - MUSE: NORMAL
KETONES UR STRIP-MCNC: NEGATIVE MG/DL
LACTATE BLD-SCNC: 2.4 MMOL/L (ref 0.7–2)
LACTATE BLD-SCNC: 2.5 MMOL/L (ref 0.7–2)
LEUKOCYTE ESTERASE UR QL STRIP: NEGATIVE
LIPASE SERPL-CCNC: 13 U/L (ref 73–393)
LYMPHOCYTES # BLD AUTO: 1 10E9/L (ref 0.8–5.3)
LYMPHOCYTES NFR BLD AUTO: 19.3 %
MAGNESIUM SERPL-MCNC: 1.9 MG/DL (ref 1.6–2.3)
MCH RBC QN AUTO: 35.5 PG (ref 26.5–33)
MCHC RBC AUTO-ENTMCNC: 32.6 G/DL (ref 31.5–36.5)
MCV RBC AUTO: 109 FL (ref 78–100)
MONOCYTES # BLD AUTO: 0.4 10E9/L (ref 0–1.3)
MONOCYTES NFR BLD AUTO: 7.4 %
NEUTROPHILS # BLD AUTO: 3.6 10E9/L (ref 1.6–8.3)
NEUTROPHILS NFR BLD AUTO: 71.5 %
NITRATE UR QL: NEGATIVE
NRBC # BLD AUTO: 0 10*3/UL
NRBC BLD AUTO-RTO: 0 /100
PH UR STRIP: 5.5 PH (ref 5–7)
PLATELET # BLD AUTO: 125 10E9/L (ref 150–450)
POTASSIUM SERPL-SCNC: 4 MMOL/L (ref 3.4–5.3)
PROT SERPL-MCNC: 7.7 G/DL (ref 6.8–8.8)
RBC # BLD AUTO: 3.13 10E12/L (ref 3.8–5.2)
RBC #/AREA URNS AUTO: 1 /HPF (ref 0–2)
SARS-COV-2 RNA SPEC QL NAA+PROBE: NORMAL
SODIUM SERPL-SCNC: 138 MMOL/L (ref 133–144)
SOURCE: ABNORMAL
SP GR UR STRIP: >1.035 (ref 1–1.03)
SPECIMEN SOURCE: NORMAL
SQUAMOUS #/AREA URNS AUTO: 8 /HPF (ref 0–1)
T4 FREE SERPL-MCNC: 1.28 NG/DL (ref 0.76–1.46)
TRANS CELLS #/AREA URNS HPF: <1 /HPF (ref 0–1)
TROPONIN I SERPL-MCNC: <0.015 UG/L (ref 0–0.04)
TSH SERPL DL<=0.005 MIU/L-ACNC: 0.08 MU/L (ref 0.4–4)
UROBILINOGEN UR STRIP-MCNC: NORMAL MG/DL (ref 0–2)
WBC # BLD AUTO: 5 10E9/L (ref 4–11)
WBC #/AREA URNS AUTO: <1 /HPF (ref 0–5)

## 2020-11-22 PROCEDURE — 70450 CT HEAD/BRAIN W/O DYE: CPT

## 2020-11-22 PROCEDURE — 87086 URINE CULTURE/COLONY COUNT: CPT | Performed by: EMERGENCY MEDICINE

## 2020-11-22 PROCEDURE — U0003 INFECTIOUS AGENT DETECTION BY NUCLEIC ACID (DNA OR RNA); SEVERE ACUTE RESPIRATORY SYNDROME CORONAVIRUS 2 (SARS-COV-2) (CORONAVIRUS DISEASE [COVID-19]), AMPLIFIED PROBE TECHNIQUE, MAKING USE OF HIGH THROUGHPUT TECHNOLOGIES AS DESCRIBED BY CMS-2020-01-R: HCPCS | Performed by: EMERGENCY MEDICINE

## 2020-11-22 PROCEDURE — 99207 PR APP CREDIT; MD BILLING SHARED VISIT: CPT | Performed by: PHYSICIAN ASSISTANT

## 2020-11-22 PROCEDURE — 99221 1ST HOSP IP/OBS SF/LOW 40: CPT | Mod: AI | Performed by: INTERNAL MEDICINE

## 2020-11-22 PROCEDURE — 999N001017 HC STATISTIC GLUCOSE BY METER IP

## 2020-11-22 PROCEDURE — 85610 PROTHROMBIN TIME: CPT | Performed by: EMERGENCY MEDICINE

## 2020-11-22 PROCEDURE — 93005 ELECTROCARDIOGRAM TRACING: CPT | Performed by: EMERGENCY MEDICINE

## 2020-11-22 PROCEDURE — 84443 ASSAY THYROID STIM HORMONE: CPT | Performed by: EMERGENCY MEDICINE

## 2020-11-22 PROCEDURE — 70450 CT HEAD/BRAIN W/O DYE: CPT | Mod: 26 | Performed by: RADIOLOGY

## 2020-11-22 PROCEDURE — 81001 URINALYSIS AUTO W/SCOPE: CPT | Performed by: EMERGENCY MEDICINE

## 2020-11-22 PROCEDURE — 85730 THROMBOPLASTIN TIME PARTIAL: CPT | Performed by: EMERGENCY MEDICINE

## 2020-11-22 PROCEDURE — 250N000013 HC RX MED GY IP 250 OP 250 PS 637: Performed by: PHYSICIAN ASSISTANT

## 2020-11-22 PROCEDURE — 82140 ASSAY OF AMMONIA: CPT | Performed by: EMERGENCY MEDICINE

## 2020-11-22 PROCEDURE — 84484 ASSAY OF TROPONIN QUANT: CPT | Performed by: EMERGENCY MEDICINE

## 2020-11-22 PROCEDURE — 83605 ASSAY OF LACTIC ACID: CPT | Performed by: EMERGENCY MEDICINE

## 2020-11-22 PROCEDURE — 84439 ASSAY OF FREE THYROXINE: CPT | Performed by: EMERGENCY MEDICINE

## 2020-11-22 PROCEDURE — 99285 EMERGENCY DEPT VISIT HI MDM: CPT | Mod: 25 | Performed by: EMERGENCY MEDICINE

## 2020-11-22 PROCEDURE — 86140 C-REACTIVE PROTEIN: CPT | Performed by: EMERGENCY MEDICINE

## 2020-11-22 PROCEDURE — 99207 PR CDG-HISTORY COMP: MEETS EXP. PROB FOCUSED- DOWN CODED LACK OF PFSH: CPT | Performed by: INTERNAL MEDICINE

## 2020-11-22 PROCEDURE — 83735 ASSAY OF MAGNESIUM: CPT | Performed by: EMERGENCY MEDICINE

## 2020-11-22 PROCEDURE — 250N000011 HC RX IP 250 OP 636: Performed by: STUDENT IN AN ORGANIZED HEALTH CARE EDUCATION/TRAINING PROGRAM

## 2020-11-22 PROCEDURE — 80053 COMPREHEN METABOLIC PANEL: CPT | Performed by: EMERGENCY MEDICINE

## 2020-11-22 PROCEDURE — 74177 CT ABD & PELVIS W/CONTRAST: CPT | Mod: 26

## 2020-11-22 PROCEDURE — 250N000011 HC RX IP 250 OP 636: Performed by: EMERGENCY MEDICINE

## 2020-11-22 PROCEDURE — 74177 CT ABD & PELVIS W/CONTRAST: CPT

## 2020-11-22 PROCEDURE — 71045 X-RAY EXAM CHEST 1 VIEW: CPT | Mod: 26

## 2020-11-22 PROCEDURE — 83690 ASSAY OF LIPASE: CPT | Performed by: EMERGENCY MEDICINE

## 2020-11-22 PROCEDURE — 87040 BLOOD CULTURE FOR BACTERIA: CPT | Performed by: INTERNAL MEDICINE

## 2020-11-22 PROCEDURE — 83605 ASSAY OF LACTIC ACID: CPT | Performed by: NURSE PRACTITIONER

## 2020-11-22 PROCEDURE — 85025 COMPLETE CBC W/AUTO DIFF WBC: CPT | Performed by: EMERGENCY MEDICINE

## 2020-11-22 PROCEDURE — P9047 ALBUMIN (HUMAN), 25%, 50ML: HCPCS | Performed by: EMERGENCY MEDICINE

## 2020-11-22 PROCEDURE — 250N000011 HC RX IP 250 OP 636: Performed by: PHYSICIAN ASSISTANT

## 2020-11-22 PROCEDURE — 84703 CHORIONIC GONADOTROPIN ASSAY: CPT | Performed by: EMERGENCY MEDICINE

## 2020-11-22 PROCEDURE — 258N000003 HC RX IP 258 OP 636: Performed by: EMERGENCY MEDICINE

## 2020-11-22 PROCEDURE — 36415 COLL VENOUS BLD VENIPUNCTURE: CPT | Performed by: NURSE PRACTITIONER

## 2020-11-22 PROCEDURE — 93010 ELECTROCARDIOGRAM REPORT: CPT | Performed by: EMERGENCY MEDICINE

## 2020-11-22 PROCEDURE — C9803 HOPD COVID-19 SPEC COLLECT: HCPCS | Performed by: EMERGENCY MEDICINE

## 2020-11-22 PROCEDURE — 71045 X-RAY EXAM CHEST 1 VIEW: CPT

## 2020-11-22 PROCEDURE — 120N000002 HC R&B MED SURG/OB UMMC

## 2020-11-22 PROCEDURE — 96365 THER/PROPH/DIAG IV INF INIT: CPT | Performed by: EMERGENCY MEDICINE

## 2020-11-22 PROCEDURE — 96361 HYDRATE IV INFUSION ADD-ON: CPT | Performed by: EMERGENCY MEDICINE

## 2020-11-22 PROCEDURE — 250N000013 HC RX MED GY IP 250 OP 250 PS 637: Performed by: EMERGENCY MEDICINE

## 2020-11-22 RX ORDER — ONDANSETRON 2 MG/ML
4 INJECTION INTRAMUSCULAR; INTRAVENOUS EVERY 6 HOURS PRN
Status: DISCONTINUED | OUTPATIENT
Start: 2020-11-22 | End: 2020-11-25 | Stop reason: HOSPADM

## 2020-11-22 RX ORDER — LACTULOSE 10 G/15ML
20 SOLUTION ORAL 3 TIMES DAILY
Status: DISCONTINUED | OUTPATIENT
Start: 2020-11-22 | End: 2020-11-22

## 2020-11-22 RX ORDER — FERROUS SULFATE 325(65) MG
325 TABLET ORAL DAILY
Status: DISCONTINUED | OUTPATIENT
Start: 2020-11-23 | End: 2020-11-25 | Stop reason: HOSPADM

## 2020-11-22 RX ORDER — PROCHLORPERAZINE 25 MG
25 SUPPOSITORY, RECTAL RECTAL EVERY 12 HOURS PRN
Status: DISCONTINUED | OUTPATIENT
Start: 2020-11-22 | End: 2020-11-25 | Stop reason: HOSPADM

## 2020-11-22 RX ORDER — CHOLECALCIFEROL (VITAMIN D3) 125 MCG
10000 CAPSULE ORAL DAILY
Status: DISCONTINUED | OUTPATIENT
Start: 2020-11-23 | End: 2020-11-25 | Stop reason: HOSPADM

## 2020-11-22 RX ORDER — AMOXICILLIN 250 MG
1 CAPSULE ORAL 2 TIMES DAILY PRN
Status: DISCONTINUED | OUTPATIENT
Start: 2020-11-22 | End: 2020-11-25 | Stop reason: HOSPADM

## 2020-11-22 RX ORDER — LACTULOSE 10 G/15ML
20 SOLUTION ORAL
Status: DISCONTINUED | OUTPATIENT
Start: 2020-11-22 | End: 2020-11-25 | Stop reason: HOSPADM

## 2020-11-22 RX ORDER — LACTULOSE 10 G/15ML
10 SOLUTION ORAL 3 TIMES DAILY
Status: DISCONTINUED | OUTPATIENT
Start: 2020-11-22 | End: 2020-11-22

## 2020-11-22 RX ORDER — SPIRONOLACTONE 100 MG/1
200 TABLET, FILM COATED ORAL DAILY
Status: DISCONTINUED | OUTPATIENT
Start: 2020-11-23 | End: 2020-11-25 | Stop reason: HOSPADM

## 2020-11-22 RX ORDER — POLYETHYLENE GLYCOL 3350 17 G/17G
17 POWDER, FOR SOLUTION ORAL DAILY PRN
Status: DISCONTINUED | OUTPATIENT
Start: 2020-11-22 | End: 2020-11-25 | Stop reason: HOSPADM

## 2020-11-22 RX ORDER — PROCHLORPERAZINE MALEATE 5 MG
10 TABLET ORAL EVERY 6 HOURS PRN
Status: DISCONTINUED | OUTPATIENT
Start: 2020-11-22 | End: 2020-11-25 | Stop reason: HOSPADM

## 2020-11-22 RX ORDER — VITAMIN E 268 MG
400 CAPSULE ORAL DAILY
Status: DISCONTINUED | OUTPATIENT
Start: 2020-11-23 | End: 2020-11-25 | Stop reason: HOSPADM

## 2020-11-22 RX ORDER — LACTULOSE 10 G/15ML
20 SOLUTION ORAL ONCE
Status: COMPLETED | OUTPATIENT
Start: 2020-11-22 | End: 2020-11-22

## 2020-11-22 RX ORDER — VENLAFAXINE 75 MG/1
75 TABLET ORAL AT BEDTIME
Status: DISCONTINUED | OUTPATIENT
Start: 2020-11-22 | End: 2020-11-25 | Stop reason: HOSPADM

## 2020-11-22 RX ORDER — ONDANSETRON 4 MG/1
4 TABLET, ORALLY DISINTEGRATING ORAL EVERY 6 HOURS PRN
Status: DISCONTINUED | OUTPATIENT
Start: 2020-11-22 | End: 2020-11-25 | Stop reason: HOSPADM

## 2020-11-22 RX ORDER — FUROSEMIDE 20 MG
40 TABLET ORAL 2 TIMES DAILY
Status: DISCONTINUED | OUTPATIENT
Start: 2020-11-22 | End: 2020-11-25 | Stop reason: HOSPADM

## 2020-11-22 RX ORDER — MULTIVITAMIN,THERAPEUTIC
1 TABLET ORAL DAILY
Status: DISCONTINUED | OUTPATIENT
Start: 2020-11-23 | End: 2020-11-25 | Stop reason: HOSPADM

## 2020-11-22 RX ORDER — ATORVASTATIN CALCIUM 10 MG/1
20 TABLET, FILM COATED ORAL DAILY
Status: DISCONTINUED | OUTPATIENT
Start: 2020-11-23 | End: 2020-11-25 | Stop reason: HOSPADM

## 2020-11-22 RX ORDER — VITAMIN B COMPLEX
4000 TABLET ORAL DAILY
Status: DISCONTINUED | OUTPATIENT
Start: 2020-11-23 | End: 2020-11-25 | Stop reason: HOSPADM

## 2020-11-22 RX ORDER — ERGOCALCIFEROL 1.25 MG/1
50000 CAPSULE, LIQUID FILLED ORAL WEEKLY
Status: DISCONTINUED | OUTPATIENT
Start: 2020-11-22 | End: 2020-11-25 | Stop reason: HOSPADM

## 2020-11-22 RX ORDER — IOPAMIDOL 755 MG/ML
80 INJECTION, SOLUTION INTRAVASCULAR ONCE
Status: COMPLETED | OUTPATIENT
Start: 2020-11-22 | End: 2020-11-22

## 2020-11-22 RX ORDER — ALBUMIN (HUMAN) 12.5 G/50ML
25 SOLUTION INTRAVENOUS ONCE
Status: COMPLETED | OUTPATIENT
Start: 2020-11-22 | End: 2020-11-22

## 2020-11-22 RX ORDER — AMOXICILLIN 250 MG
2 CAPSULE ORAL 2 TIMES DAILY PRN
Status: DISCONTINUED | OUTPATIENT
Start: 2020-11-22 | End: 2020-11-25 | Stop reason: HOSPADM

## 2020-11-22 RX ORDER — LIDOCAINE 40 MG/G
CREAM TOPICAL
Status: DISCONTINUED | OUTPATIENT
Start: 2020-11-22 | End: 2020-11-25 | Stop reason: HOSPADM

## 2020-11-22 RX ORDER — ASCORBIC ACID 250 MG
125 TABLET ORAL DAILY
Status: DISCONTINUED | OUTPATIENT
Start: 2020-11-23 | End: 2020-11-25 | Stop reason: HOSPADM

## 2020-11-22 RX ORDER — NICOTINE POLACRILEX 4 MG
15-30 LOZENGE BUCCAL
Status: DISCONTINUED | OUTPATIENT
Start: 2020-11-22 | End: 2020-11-25 | Stop reason: HOSPADM

## 2020-11-22 RX ORDER — FOLIC ACID 1 MG/1
1 TABLET ORAL DAILY
Status: DISCONTINUED | OUTPATIENT
Start: 2020-11-23 | End: 2020-11-25 | Stop reason: HOSPADM

## 2020-11-22 RX ORDER — LACTULOSE 10 G/15ML
100 SOLUTION ORAL
Status: DISCONTINUED | OUTPATIENT
Start: 2020-11-22 | End: 2020-11-25 | Stop reason: HOSPADM

## 2020-11-22 RX ORDER — LANOLIN ALCOHOL/MO/W.PET/CERES
1000 CREAM (GRAM) TOPICAL DAILY
Status: DISCONTINUED | OUTPATIENT
Start: 2020-11-23 | End: 2020-11-25 | Stop reason: HOSPADM

## 2020-11-22 RX ORDER — CALCIUM CARBONATE 500 MG/1
500 TABLET, CHEWABLE ORAL DAILY PRN
Status: DISCONTINUED | OUTPATIENT
Start: 2020-11-22 | End: 2020-11-25 | Stop reason: HOSPADM

## 2020-11-22 RX ORDER — DEXTROSE MONOHYDRATE 25 G/50ML
25-50 INJECTION, SOLUTION INTRAVENOUS
Status: DISCONTINUED | OUTPATIENT
Start: 2020-11-22 | End: 2020-11-25 | Stop reason: HOSPADM

## 2020-11-22 RX ADMIN — ENOXAPARIN SODIUM 40 MG: 40 INJECTION SUBCUTANEOUS at 20:26

## 2020-11-22 RX ADMIN — SODIUM CHLORIDE 500 ML: 9 INJECTION, SOLUTION INTRAVENOUS at 15:15

## 2020-11-22 RX ADMIN — VENLAFAXINE 75 MG: 75 TABLET ORAL at 23:54

## 2020-11-22 RX ADMIN — LACTULOSE 20 G: 20 SOLUTION ORAL at 15:15

## 2020-11-22 RX ADMIN — RIFAXIMIN 550 MG: 550 TABLET ORAL at 20:25

## 2020-11-22 RX ADMIN — IOPAMIDOL 80 ML: 755 INJECTION, SOLUTION INTRAVENOUS at 14:59

## 2020-11-22 RX ADMIN — FUROSEMIDE 40 MG: 20 TABLET ORAL at 20:25

## 2020-11-22 RX ADMIN — ALBUMIN HUMAN 25 G: 0.25 SOLUTION INTRAVENOUS at 17:24

## 2020-11-22 ASSESSMENT — ACTIVITIES OF DAILY LIVING (ADL)
DIFFICULTY_COMMUNICATING: NO
FALL_HISTORY_WITHIN_LAST_SIX_MONTHS: NO
CONCENTRATING,_REMEMBERING_OR_MAKING_DECISIONS_DIFFICULTY: NO
WEAR_GLASSES_OR_BLIND: YES
WALKING_OR_CLIMBING_STAIRS_DIFFICULTY: NO
TOILETING_ISSUES: NO
HEARING_DIFFICULTY_OR_DEAF: NO
DRESSING/BATHING_DIFFICULTY: NO
DIFFICULTY_EATING/SWALLOWING: NO
DOING_ERRANDS_INDEPENDENTLY_DIFFICULTY: NO

## 2020-11-22 NOTE — ED PROVIDER NOTES
[place] EMERGENCY DEPARTMENT ([facility])  November 22, 2020  History     Chief Complaint   Patient presents with     Fatigue     HPI  Susan Puckett is a 50 year old female who has a PMH of DM2, hx of gastric ulcers, primary hypothyroidism, liver cirrhosis 2/2 CUETO c/b episodes of hepatic encephalopathy on lactulose, depression, hx of appendectomy (1989) and Faustino en Y gastric bypass (2006), who presents to the Emergency Department for fatigue and generalized weakness.     Patient reports that she has had confusion today which she describes as decreased concentration.  She also feels weak all over and exhausted since yesterday.  She states that she is on lactulose 3 times per day and has not missed any doses and did take her dose today.  She has been having 3 bowel movements daily and there have not been any changes in this. She reports having a bowel movement today.  No melena, hematochezia, or hematemesis. No diarrhea.  She has not had a fever but states that she is chronically chills which is unchanged.  No chest pain or shortness of breath.  No cough.  No numbness or tingling.  No urinary symptoms.  States that she had a UTI in October and did not present with any symptoms at that time either.  No sore throat, no body aches, no loss of taste or smell.  States that sometimes she will have headaches 2-3 times per week for which she takes Imitrex.  She does not currently have a headache however.       Reports that she has has not had abdominal discomfort this past week which is intermittent with no precipitating factors that she knows of.  States his pain is mostly in her lower abdomen.  She states that she has had 1 day of nausea and vomiting this week but none today.  No blood in the emesis.  States that her food intake has been reduced over the past week but she is still drinking fluids.  She states that she has been taking Zofran for nausea including this morning. Is not currently nauseous.    She  states that she has had ascites in the past and last had a abdominal paracentesis in June.  She says that she has not had fluid accumulation in her abdomen since then.  She also reports that she had a thoarcentesis in July as well.    She states that she has had chronic difficulty swallowing but that none of her providers have really investigated this.  She states that she was tested for Covid 2 weeks ago and was negative.  She had this Covid test because her daughter was positive and although she has not had direct contact with daughter, her  had been directly exposed and the patient lives with her .  She has been in quarantine at home since.  She reports that she is currently on the live donor transplant list.    Recently saw Endo over video on 11/16. Had levothyroxine reduced to 175 mcg daily and was swapped from metformin and started on Januvia due to her liver disease.          PAST MEDICAL HISTORY:   Past Medical History:   Diagnosis Date     Depressive disorder      Diabetes (H)     Type 2 DM/No Insulin      History of blood transfusion     10/2019     Liver cirrhosis secondary to CUETO (H) 05/28/2020     Thyroid disease        PAST SURGICAL HISTORY:   Past Surgical History:   Procedure Laterality Date     APPENDECTOMY      1989     COLONOSCOPY      1/2020 at [facility]      ENT SURGERY       GI SURGERY      EGD August 2020 at [facility]      GYN SURGERY      2010     RNY gastric bypass  01/2006    retoocolic, retrogastric, [facility]     VASCULAR SURGERY         Past medical history, past surgical history, medications, and allergies were reviewed with the patient. Additional pertinent items: None    FAMILY HISTORY: No family history on file.    SOCIAL HISTORY:   Social History     Tobacco Use     Smoking status: Never Smoker     Smokeless tobacco: Never Used   Substance Use Topics     Alcohol use: Not Currently     Frequency: Never     Binge frequency: Never     Comment: Last drink was in  2017     Social history was reviewed with the patient. Additional pertinent items: None      Current Discharge Medication List      CONTINUE these medications which have NOT CHANGED    Details   atorvastatin (LIPITOR) 20 MG tablet Take 20 mg by mouth daily       calcium carbonate (TUMS) 500 MG chewable tablet Take 1 tablet by mouth daily as needed for heartburn       calcium citrate-vitamin D (CITRACAL W/D) 250-100 MG-UNIT tablet Take 2 tablets by mouth 2 times daily (with meals)  Qty: 336 tablet, Refills: 3    Associated Diagnoses: Osteopenia      cholecalciferol (VITAMIN D-1000 MAX ST) 25 MCG (1000 UT) TABS Take 4,000 Units by mouth      cyanocobalamin (VITAMIN B-12) 1000 MCG tablet Take 1,000 mcg by mouth daily       Ferrous Sulfate (IRON) 325 (65 FE) MG tablet Take 1 tablet by mouth daily      folic acid (FOLVITE) 1 MG tablet Take 1 mg by mouth daily       furosemide (LASIX) 40 MG tablet Take 40 mg by mouth 2 times daily       lactulose (CHRONULAC) 10 GM/15ML solution Take 20 g by mouth 2 times daily       levothyroxine (SYNTHROID) 175 MCG tablet Take 175 mcg by mouth every day except for Monday and Thursday      multivitamin (DEKAS ESSENTIAL) capsule Take 1 capsule by mouth daily       omeprazole (PRILOSEC) 20 MG DR capsule Take 20 mg by mouth daily       ondansetron (ZOFRAN-ODT) 4 MG ODT tab Place 4 mg under the tongue every 6 hours as needed for nausea       rifaximin (XIFAXAN) 550 MG TABS tablet Take 550 mg by mouth 2 times daily       spironolactone (ALDACTONE) 100 MG tablet Take 200 mg by mouth daily       SUMAtriptan (IMITREX) 50 MG tablet Take 50 mg by mouth at onset of headache for migraine       traZODone (DESYREL) 100 MG tablet Take  mg by mouth nightly as needed for sleep      valACYclovir (VALTREX) 1000 mg tablet Take 1,000 mg by mouth daily as needed (at onset of cold sore)       venlafaxine (EFFEXOR) 75 MG tablet Take 1 tablet (75 mg) by mouth At Bedtime  Qty: 30 tablet, Refills: 0     Comments: Renewal requests go to Brooke Billy with [facility].  Associated Diagnoses: Moderate episode of recurrent major depressive disorder (H); Generalized anxiety disorder      vitamin A 3 MG (73223 UNITS) capsule Take 10,000 Units by mouth daily       vitamin C (ASCORBIC ACID) 100 MG tablet Take 100 mg by mouth daily       vitamin D2 (ERGOCALCIFEROL) 45177 units (1250 mcg) capsule Take 1 capsule (50,000 Units) by mouth once a week for 10 doses  Qty: 10 capsule, Refills: 0    Associated Diagnoses: Vitamin D deficiency; Osteopenia      vitamin E (TOCOPHEROL) 400 units (180 mg) capsule Take 400 Units by mouth daily       Vitamin K, Phytonadione, 100 MCG TABS Take 100 mcg by mouth daily       metFORMIN (GLUCOPHAGE) 1000 MG tablet Take 1,000 mg by mouth 2 times daily (with meals)                 Allergies   Allergen Reactions     Prednisone      Nsaids Other (See Comments)        Review of Systems  A complete review of systems was performed with pertinent positives and negatives noted in the HPI, and all other systems negative.    Physical Exam   BP: 131/80  Pulse: 65  Temp: 98.7  F (37.1  C)  Resp: 18  Weight: 59.5 kg (131 lb 1.6 oz)  SpO2: 98 %      Physical Exam  Constitutional:       General: She is not in acute distress.     Comments: Intermittently somewhat slow to answer questions but is awake and alert. Chronically ill appearing   HENT:      Head: Normocephalic and atraumatic.      Mouth/Throat:      Mouth: Mucous membranes are dry.      Pharynx: No oropharyngeal exudate or posterior oropharyngeal erythema.   Eyes:      General: No visual field deficit.     Extraocular Movements: Extraocular movements intact.      Pupils: Pupils are equal, round, and reactive to light.   Neck:      Musculoskeletal: Normal range of motion and neck supple. No neck rigidity.   Cardiovascular:      Rate and Rhythm: Normal rate and regular rhythm.      Pulses: Normal pulses.      Heart sounds: Normal heart sounds. No murmur.    Pulmonary:      Effort: Pulmonary effort is normal. No respiratory distress.      Breath sounds: Normal breath sounds. No stridor. No wheezing or rales.   Abdominal:      General: Bowel sounds are normal. There is no distension.      Palpations: Abdomen is soft. There is no mass.      Tenderness: There is no right CVA tenderness, left CVA tenderness, guarding or rebound.      Comments: Mild generalized abdominal tenderness.    Musculoskeletal: Normal range of motion.         General: No swelling or tenderness.   Skin:     General: Skin is warm and dry.      Capillary Refill: Capillary refill takes less than 2 seconds.   Neurological:      General: No focal deficit present.      Mental Status: She is alert and oriented to person, place, and time.      Cranial Nerves: Cranial nerves are intact. No cranial nerve deficit, dysarthria or facial asymmetry.      Sensory: Sensation is intact. No sensory deficit.      Motor: Motor function is intact. No weakness, abnormal muscle tone or pronator drift.      Coordination: Finger-Nose-Finger Test normal.      Comments: 5-5 strength in all 4 extremities bilaterally.  Sensation intact light touch in all 4 extremities and the face bilaterally.  Finger-to-nose intact bilaterally.  Speech appears normal.  Does at times have some word finding difficulties and slow to respond.  No facial asymmetry.  No slurred speech or dysarthria.  Possible slight tremor/very mild asterixis.    Psychiatric:         Mood and Affect: Mood normal.         ED Course   2:01 PM  The patient was seen and examined by Harleen Flowers MD in Room ED19.       Procedures             EKG Interpretation:      Interpreted by Harleen Flowers MD  Time reviewed: 2:40 pm  Symptoms at time of EKG: generalized weakness   Rhythm: normal sinus   Rate: normal  Axis: normal  Ectopy: none  Conduction: normal  ST Segments/ T Waves: No ST-T wave changes  Q Waves: none  Comparison to prior: Unchanged    Clinical  Impression: No evidence of acute ischemia                         Results for orders placed or performed during the hospital encounter of 11/22/20 (from the past 24 hour(s))   CBC with platelets differential   Result Value Ref Range    WBC 5.0 4.0 - 11.0 10e9/L    RBC Count 3.13 (L) 3.8 - 5.2 10e12/L    Hemoglobin 11.1 (L) 11.7 - 15.7 g/dL    Hematocrit 34.1 (L) 35.0 - 47.0 %     (H) 78 - 100 fl    MCH 35.5 (H) 26.5 - 33.0 pg    MCHC 32.6 31.5 - 36.5 g/dL    RDW 14.9 10.0 - 15.0 %    Platelet Count 125 (L) 150 - 450 10e9/L    Diff Method Automated Method     % Neutrophils 71.5 %    % Lymphocytes 19.3 %    % Monocytes 7.4 %    % Eosinophils 1.0 %    % Basophils 0.4 %    % Immature Granulocytes 0.4 %    Nucleated RBCs 0 0 /100    Absolute Neutrophil 3.6 1.6 - 8.3 10e9/L    Absolute Lymphocytes 1.0 0.8 - 5.3 10e9/L    Absolute Monocytes 0.4 0.0 - 1.3 10e9/L    Absolute Eosinophils 0.1 0.0 - 0.7 10e9/L    Absolute Basophils 0.0 0.0 - 0.2 10e9/L    Abs Immature Granulocytes 0.0 0 - 0.4 10e9/L    Absolute Nucleated RBC 0.0    Comprehensive metabolic panel   Result Value Ref Range    Sodium 138 133 - 144 mmol/L    Potassium 4.0 3.4 - 5.3 mmol/L    Chloride 106 94 - 109 mmol/L    Carbon Dioxide 28 20 - 32 mmol/L    Anion Gap 4 3 - 14 mmol/L    Glucose 379 (H) 70 - 99 mg/dL    Urea Nitrogen 19 7 - 30 mg/dL    Creatinine 0.95 0.52 - 1.04 mg/dL    GFR Estimate 70 >60 mL/min/[1.73_m2]    GFR Estimate If Black 82 >60 mL/min/[1.73_m2]    Calcium 8.7 8.5 - 10.1 mg/dL    Bilirubin Total 0.8 0.2 - 1.3 mg/dL    Albumin 2.7 (L) 3.4 - 5.0 g/dL    Protein Total 7.7 6.8 - 8.8 g/dL    Alkaline Phosphatase 137 40 - 150 U/L    ALT 32 0 - 50 U/L    AST 42 0 - 45 U/L   Lipase   Result Value Ref Range    Lipase 13 (L) 73 - 393 U/L   Ammonia   Result Value Ref Range    Ammonia 122 (HH) 10 - 50 umol/L   TSH with free T4 reflex   Result Value Ref Range    TSH 0.08 (L) 0.40 - 4.00 mU/L   CRP inflammation   Result Value Ref Range    CRP  Inflammation 3.7 0.0 - 8.0 mg/L   HCG qualitative Blood   Result Value Ref Range    HCG Qualitative Serum Negative NEG^Negative   Magnesium   Result Value Ref Range    Magnesium 1.9 1.6 - 2.3 mg/dL   INR   Result Value Ref Range    INR 1.17 (H) 0.86 - 1.14   Partial thromboplastin time   Result Value Ref Range    PTT 32 22 - 37 sec   Lactic acid whole blood   Result Value Ref Range    Lactic Acid 2.4 (H) 0.7 - 2.0 mmol/L   Troponin I   Result Value Ref Range    Troponin I ES <0.015 0.000 - 0.045 ug/L   T4 free   Result Value Ref Range    T4 Free 1.28 0.76 - 1.46 ng/dL   Symptomatic COVID-19 Virus (Coronavirus) by PCR    Specimen: Nasopharyngeal   Result Value Ref Range    COVID-19 Virus PCR to [facility] - Source Nasopharyngeal     COVID-19 Virus PCR to [facility] - Result       Test received-See reflex to IDDL test SARS CoV2 (COVID-19) Virus RT-PCR   EKG 12 lead   Result Value Ref Range    Interpretation ECG Click View Image link to view waveform and result    XR Chest Port 1 View    Narrative    EXAM: XR CHEST PORT 1 VW  11/22/2020 2:43 PM     HISTORY:  generalized weakness, fatigue, concern for COVID, eval for  pathology       COMPARISON:  None    FINDINGS:   Portable upright AP radiograph of the chest. Lung volumes are low. The  trachea is midline. The cardiac silhouette size is normal. No focal  airspace consolidations. No pneumothorax. No pleural effusion.  Visualized upper abdomen is unremarkable. No acute osseous  abnormality.      Impression    IMPRESSION:   No acute cardiopulmonary disease.    I have personally reviewed the examination and initial interpretation  and I agree with the findings.    NUBIA ABREU MD   CT Abdomen Pelvis w Contrast    Narrative    EXAMINATION: CT ABDOMEN PELVIS W CONTRAST  11/22/2020 3:11 PM      CLINICAL HISTORY: generalized abdominal pain, confusion, generalized  weakness, hx of CUETO cirrhosis, not needed paracentesis since June,  eval for pathology    COMPARISON: CT  10/26/2020, 6/4/2020 and MR 6/30/2020    PROCEDURE COMMENTS: CT of the abdomen was performed with Isovue 370  intravenous contrast. Coronal and sagittal reformatted images were  obtained.    FINDINGS:  Lower thorax:   The visualized portions of the lung bases are clear. The heart size is  normal.    Abdomen and pelvis:  Cirrhotic configuration of the liver without focal hepatic mass. The  gallbladder is normal appearance. Diffuse fatty replacement of the  pancreas. Splenomegaly. The adrenal glands are normal in appearance.  The kidneys are normal in appearance with no hydronephrosis. The  bladder is well-distended and appears unremarkable. Uterus and ovaries  are normal in appearance. No free intraperitoneal air.    Postoperative changes of gastric bypass. No abnormally dilated loops  of large or small bowel. Mild colonic wall thickening. There is a  large colonic stool burden. The appendix is surgically absent. No  pneumatosis or portal venous gas. The infrarenal abdominal aorta is of  normal caliber. Mild atherosclerotic calcifications of the  intra-abdominal aorta. Multiple stable para-aortic lymph nodes,  largest measuring 1.1 cm (series 3 image 169). Stable enlarged lymph  nodes about the mary hepatis, largest measuring 2.1 cm (series 3  image 89). No enlarged inguinal lymph nodes.    Osseous structures:   Multilevel degenerative changes of the spine. No suspicious or  abnormal-appearing bone lesions.      Impression    IMPRESSION:  1. Mild colonic bowel wall thickening with a large colonic stool  burden, likely related to cirrhosis and portal hypertension.    2. Cirrhotic configuration of the liver with findings of portal  hypertension. No focal hepatic masses or substantial intra-abdominal  ascites.    3. Enlarged retroperitoneal and mary hepatis lymph nodes, stable from  6/4/2020.    I have personally reviewed the examination and initial interpretation  and I agree with the findings.    NUBIA  MD LOIS   CT Head w/o Contrast    Narrative    CT HEAD W/O CONTRAST 11/22/2020 3:11 PM    History: weakness, confusion, worsening headaches, eval for bleed     Comparison: Brain MRI dated 8/24/2020    Technique: Using multidetector thin collimation helical acquisition  technique, axial, coronal and sagittal CT images from the skull base  to the vertex were obtained without intravenous contrast.    Findings: There is no intracranial hemorrhage, mass effect, or midline  shift. Gray/white matter differentiation in both cerebral hemispheres  is preserved. Ventricles are proportionate to the cerebral sulci. The  basal cisterns are clear.    The bony calvaria and the bones of the skull base are normal. The  visualized portions of the paranasal sinuses and mastoid air cells are  clear.       Impression    Impression:  No acute intracranial pathology.          I have personally reviewed the examination and initial interpretation  and I agree with the findings.    ZAIN PAIGE MD   UA with Microscopic   Result Value Ref Range    Color Urine 1.010     Appearance Urine Clear     Glucose Urine 300 (A) NEG^Negative mg/dL    Bilirubin Urine Negative NEG^Negative    Ketones Urine Negative NEG^Negative mg/dL    Specific Gravity Urine >1.035 (H) 1.003 - 1.035    Blood Urine Negative NEG^Negative    pH Urine 5.5 5.0 - 7.0 pH    Protein Albumin Urine Negative NEG^Negative mg/dL    Urobilinogen mg/dL Normal 0.0 - 2.0 mg/dL    Nitrite Urine Negative NEG^Negative    Leukocyte Esterase Urine Negative NEG^Negative    Source Midstream Urine     WBC Urine <1 0 - 5 /HPF    RBC Urine 1 0 - 2 /HPF    Squamous Epithelial /HPF Urine 8 (H) 0 - 1 /HPF    Transitional Epi <1 0 - 1 /HPF   Urine Culture    Specimen: Urine Midstream; Midstream Urine   Result Value Ref Range    Specimen Description Midstream Urine     Special Requests Specimen received in preservative     Culture Micro PENDING    Blood culture    Specimen: Arm, Right; Blood     Right Arm   Result Value Ref Range    Specimen Description Blood Right Arm     Culture Micro PENDING    Blood culture    Specimen: Hand, Right; Blood    Right Hand   Result Value Ref Range    Specimen Description Blood Right Hand     Culture Micro PENDING    Lactic acid whole blood   Result Value Ref Range    Lactic Acid 2.5 (H) 0.7 - 2.0 mmol/L   Glucose by meter   Result Value Ref Range    Glucose 334 (H) 70 - 99 mg/dL     Medications   lidocaine 1 % 0.1-1 mL (has no administration in time range)   lidocaine (LMX4) cream (has no administration in time range)   sodium chloride (PF) 0.9% PF flush 3 mL (has no administration in time range)   sodium chloride (PF) 0.9% PF flush 3 mL (3 mLs Intracatheter Given 11/22/20 2355)   enoxaparin ANTICOAGULANT (LOVENOX) injection 40 mg (40 mg Subcutaneous Given 11/22/20 2026)   senna-docusate (SENOKOT-S/PERICOLACE) 8.6-50 MG per tablet 1 tablet (has no administration in time range)     Or   senna-docusate (SENOKOT-S/PERICOLACE) 8.6-50 MG per tablet 2 tablet (has no administration in time range)   polyethylene glycol (MIRALAX) Packet 17 g (has no administration in time range)   ondansetron (ZOFRAN-ODT) ODT tab 4 mg (has no administration in time range)     Or   ondansetron (ZOFRAN) injection 4 mg (has no administration in time range)   prochlorperazine (COMPAZINE) injection 10 mg (has no administration in time range)     Or   prochlorperazine (COMPAZINE) tablet 10 mg (has no administration in time range)     Or   prochlorperazine (COMPAZINE) suppository 25 mg (has no administration in time range)   atorvastatin (LIPITOR) tablet 20 mg (has no administration in time range)   calcium carbonate (TUMS) chewable tablet 500 mg (has no administration in time range)   calcium citrate-vitamin D (CITRACAL) 315-250 MG-UNIT per tablet 2 tablet (has no administration in time range)   Vitamin D3 (CHOLECALCIFEROL) tablet 4,000 Units (has no administration in time range)   cyanocobalamin  (VITAMIN B-12) tablet 1,000 mcg (has no administration in time range)   ferrous sulfate (FEROSUL) tablet 325 mg (has no administration in time range)   folic acid (FOLVITE) tablet 1 mg (has no administration in time range)   furosemide (LASIX) tablet 40 mg (40 mg Oral Given 11/22/20 2025)   levothyroxine (SYNTHROID/LEVOTHROID) tablet 175 mcg (has no administration in time range)   multivitamin, therapeutic (THERA-VIT) tablet 1 tablet (has no administration in time range)   omeprazole (priLOSEC) CR capsule 20 mg (has no administration in time range)   rifaximin (XIFAXAN) tablet 550 mg (550 mg Oral Given 11/22/20 2025)   spironolactone (ALDACTONE) tablet 200 mg (has no administration in time range)   venlafaxine (EFFEXOR) tablet 75 mg (75 mg Oral Given 11/22/20 2354)   vitamin A capsule 10,000 Units (has no administration in time range)   ascorbic acid half-tab 125 mg (has no administration in time range)   vitamin D2 (ERGOCALCIFEROL) 19489 units (1250 mcg) capsule 50,000 Units (50,000 Units Oral Not Given 11/22/20 2000)   vitamin E (TOCOPHEROL) 400 units (180 mg) capsule 400 Units (has no administration in time range)   phytonadione (MEPHYTON/VITAMIN K) 1 MG/ML oral solution 0.1 mg (has no administration in time range)   sitagliptin (JANUVIA) tablet 100 mg (has no administration in time range)   Daily 2 GRAM acetaminophen limit, unless fulminent liver failure or transaminases greater than or equal to 300 - 400, then none (has no administration in time range)   lactulose (CHRONULAC) solution 20 g (has no administration in time range)     Or   lactulose (CHRONULAC) solution for enema prep 100 g (has no administration in time range)   glucose gel 15-30 g (has no administration in time range)     Or   dextrose 50 % injection 25-50 mL (has no administration in time range)     Or   glucagon injection 1 mg (has no administration in time range)   iopamidol (ISOVUE-370) solution 80 mL (80 mLs Intravenous Given 11/22/20 1750)    sodium chloride (PF) 0.9% PF flush 70 mL (70 mLs Intravenous Given 11/22/20 1459)   lactulose (CHRONULAC) solution 20 g (20 g Oral Given 11/22/20 1515)   0.9% sodium chloride BOLUS (0 mLs Intravenous Stopped 11/22/20 1641)   albumin human 25 % injection 25 g (0 g Intravenous Stopped 11/22/20 1843)             Assessments & Plan (with Medical Decision Making)   Patient presents with generalized weakness, fatigue and confusion.  Patient does have Du cirrhosis and history of hepatic encephalopathy on lactulose.  She states she has been taking her doses as prescribed and having 3 bowel movements a day.  She states today she feels as though she has been more confused with trouble finding her words.  She denies any recent falls or trauma.  She states that she has been feeling generally weak and fatigued over the past couple of days and had some vomiting about a week ago.  She was exposed to her daughter who has COVID-19 and recently had a Covid test on 11-5-2020 that was negative.  She has no focal neurologic deficits here.  She does have some mild generalized tenderness to palpation of her abdomen but has no distention or sign of fluid wave.  No signs of peritonitis.  Vital signs are within normal limits and she is afebrile.  She does appear slightly slow to respond and sometimes having some word finding difficulties but again has no focal neurologic deficits.  Differential diagnosis includes was not limited to hepatic encephalopathy, metabolic derangement, intracranial hemorrhage, very much less likely CVA as she has no focal deficits, SBP however again appears to be very unlikely as she has no abdominal distention or signs of significant ascites and has not had paracentesis since June, UTI, pneumonia, COVID-19.  COVID-19 PCR was obtained and she was placed on precautions as she has had direct exposure to her daughter and with her ongoing symptoms.  Laboratory studies were initiated.  Lactic acid was slightly  elevated at 2.4 however her white blood cell count is normal at 5 and she has no sirs criteria at this time.  Did think this may potentially be related to some dehydration as well as her liver disease.  Again has no signs of peritonitis.  We did obtain a CT of the abdomen and pelvis with IV contrast for further evaluation and this Revealed mild colonic bowel wall thickening with large colonic stool burden likely related to cirrhosis and portal hypertension. Cirrhotic configuration of the liver with findings of portal hypertension.  No focal hepatic masses or substantial intra-abdominal ascites.  With the lack of ascites we felt that SBP would be very unlikely at this time.  She has a large retroperitoneal and mary hepatis lymph node stable from June.    EKG was obtained does not reveal any evidence of acute ischemia.  CRP is also low at 3.7 and within normal limits.  Thus again infectious etiology is less likely.  INR is 1.17.  Lipase is 13.  Magnesium is 1.9.  Troponin is less than 0.015 and thus we felt that cardiac etiology was unlikely.  Pregnancy test negative.  CMP is largely unremarkable with the exception of some slightly high glucose of 379 however her bicarb is normal anion gap is normal and thus no signs of DKA.  Creatinine is within normal limits.  Bilirubin alk phos ALT and AST are also within normal limits.  TSH is low at 0.08 however free T4 is within normal limits at 1.28 and she is on levothyroxine.  Urinalysis does not reveal any evidence of UTI.  Urine culture is pending.  CT of the head is unremarkable.  Chest x-ray is also unremarkable.  COVID-19 test pending at this time.    Patient does appear dry at this time and was given 500 mL of IV fluid.  Her ammonia was elevated at 122 and thus we do feel this is the likely etiology of her overall symptoms.  She was given an additional 20 g of lactulose orally here which she tolerated.  Do feel she warrants admission for further management.  Patient  was agreement with this plan.  I spoke with medicine who accepted the patient for admission.  They also suggested that we give her 25 g of 25% albumin which was ordered.  At this time, patient is pending admission to her inpatient bed and care was transferred to the medicine team in stable condition.    I have reviewed the nursing notes.    I have reviewed the findings, diagnosis, plan and need for follow up with the patient.    Current Discharge Medication List          Final diagnoses:   Hepatic encephalopathy (H)   Exposure to COVID-19 virus     I, Azar Hopkins, am serving as a trained medical scribe to document services personally performed by Harleen Flowers MD, based on the provider's statements to me.   IHarleen MD, was physically present and have reviewed and verified the accuracy of this note documented by Azar Hopkins.     11/22/2020   [facility] EMERGENCY DEPARTMENT     Harleen Flowers MD  11/23/20 0048

## 2020-11-22 NOTE — ED TRIAGE NOTES
Susan presents from home with c/o fatigue and confusion since yesterday. History of CUETO. She reports no changes to her medications and has been taking lactulose as ordered. Reports some abdominal pain and anorexia. VSS in triage.

## 2020-11-22 NOTE — H&P
[facility]     History and Physical - Hospitalist Service, Gold Night       Date of Admission:  11/22/2020    Assessment & Plan   Susan Puckett is a 50 year old female admitted on 11/22/2020. She has a past medical history significant for DM2, gastric ulcers, primary hypothyroidism, liver cirrhosis 2/2 CUETO c/b hepatic encephalopathy, depression, and h/o Faustino en Y gastric bypass who presented with fatigue, confusion and generalized weakness. She is admitted to medicine for acute hepatic encephalopathy and COVID PUI.    Acute hepatic encephalopathy  Liver cirrhosis due to CUETO  Presenting with confusion and generalize weakness. Ammonia 122. Lactate 2.4, INR 1.17. PTA on lactulose 10 mg TID, having 3 bowel movements daily. CT head and CT A/P without acute pathology. Noted to have large stool burden on CT. Low suspicion for bacterial infection precipitating worsening encephalopathy as no leukocytosis and CRP wnl, however has had UTI without symptoms and similar presentation in past. Awaiting liver transplant, follows with Dr. Cook.  - pta lasix 40 mg TID, spironolactone 200 mg daily  - pta rifaximin 550 mg BID  - pending UA/UC  - pending COVID as below  - [place] protocol  - pending BCs  - trending CMP    COVID PUI  Nausea  Vomiting  Had possible COVID exposure, and was tested 2 weeks prior which was negative. Reports she has been quarantined at home. Had 1 day of nausea/vomiting earlier in the week; poor intake but tolerating fluids. CXR without acute pathology.  - pending COVID   - prn zofran, compazine  - advance diet as tolerated  - encourage PO fluids    Hypothyroidism  TSH low 0.08 on admission, T4 free wnl 1.28. Had recent virtual visit with Endo who reduced her levothyroxine to 175 mcg dialy.  - continue pta levothyroxine 175 mcg    DM2  Recent virtual visit with Endocrine; discontinued metformin due to liver disease. Started on Januvia. Glucose 379 on  admission.   - continued pta januvia 100 mg daily    GERD  H/o gastric ulcers  - pta omeprazole 20 mg daily    Hyperlipidemia  - pta atorvastatin 20mg daily    Chronic macrocytic anemia  S/p Faustino en Y gastric bypass   Hgb 11.1, . Stable, at baseline. Anemia likely multifactorial in setting of chronic liver disease and previous gastric bypass surgery.  - pta vitamin B12 1000 mcg daily, [id number] mg daily, folate 1 mg daily  - pta multivitamin, Vit A, C, D2, E, K     Chronic thrombocytopenia  Ptls 125 on admission. Stable, at baseline. Likely secondary to liver disease.  - monitor    Anxiety  Depression  - pta venlafaxine 5 mg at bedtime     Insomnia  - holding pta trazodone in setting of encephalopathy       Diet: 2 Gram Sodium Diet  DVT Prophylaxis: Enoxaparin (Lovenox) SQ  Rossi Catheter: not present  Code Status: Full Code  Rule Out COVID-19 Handoff:  Susan is a LOW SUSPICION PUI.  Follow these instructions:    If COVID test positive -> continue isolation precautions    If COVID test negative -> discontinue COVID-specific isolation precautions       Disposition Plan   Expected discharge: 2 - 3 days, recommended to prior living arrangement once mental status at baseline and pending work-up completed.  Entered: Halie Landa PA-C 11/22/2020, 7:25 PM     The patient's care was discussed with the Attending Physician, Dr. Christianson and Patient.    Halie Landa PA-C  [facility]   Contact information available via [facility] Paging/Directory  Please see sign in/sign out for up to date coverage information    ______________________________________________________________________    Chief Complaint   Weakness, fatigue, confusion    History is obtained from the patient    History of Present Illness   uSsan Puckett is a 50 year old female who has a past medical history significant for DM2, gastric ulcers, primary hypothyroidism, liver cirrhosis 2/2 CUETO c/b hepatic  encephalopathy, depression, and h/o Faustino en Y gastric bypass who presented with fatigue, confusion and generalized weakness. She is admitted to medicine for acute hepatic encephalopathy and COVID PUI.    She notes worsening weakness and confusion over the past 1-2 days. Has been taking her lactulose to effect of 3 BMs daily. Does note some poor intake and nausea over the last week. Had 1 episode of emesis last week. No loss of taste or smell. Denies any fevers.       Review of Systems    The 10 point Review of Systems is negative other than noted in the HPI or here.     Past Medical History    I have reviewed this patient's medical history and updated it with pertinent information if needed.   Past Medical History:   Diagnosis Date     Depressive disorder      Diabetes (H)     Type 2 DM/No Insulin      History of blood transfusion     10/2019     Liver cirrhosis secondary to CUETO (H) 05/28/2020     Thyroid disease        Past Surgical History   I have reviewed this patient's surgical history and updated it with pertinent information if needed.  Past Surgical History:   Procedure Laterality Date     APPENDECTOMY      1989     COLONOSCOPY      1/2020 at [facility]      ENT SURGERY       GI SURGERY      EGD August 2020 at [facility]      GYN SURGERY      2010     RNY gastric bypass  01/2006    retoocolic, retrogastric, [facility]     VASCULAR SURGERY         Social History   I have reviewed this patient's social history and updated it with pertinent information if needed.  Social History     Tobacco Use     Smoking status: Never Smoker     Smokeless tobacco: Never Used   Substance Use Topics     Alcohol use: Not Currently     Frequency: Never     Binge frequency: Never     Comment: Last drink was in 2017     Drug use: Never       Family History   No significant family history.    Prior to Admission Medications   Prior to Admission Medications   Prescriptions Last Dose Informant Patient Reported? Taking?   Ferrous  Sulfate (IRON) 325 (65 FE) MG tablet   Yes No   Sig: Take 1 tablet by mouth daily   SUMAtriptan (IMITREX) 50 MG tablet   Yes No   Sig: Take 50 mg by mouth at onset of headache for migraine    Vitamin K, Phytonadione, 100 MCG TABS   Yes No   Sig: Take 100 mcg by mouth daily    atorvastatin (LIPITOR) 20 MG tablet   Yes No   Sig: Take 20 mg by mouth daily    calcium carbonate (TUMS) 500 MG chewable tablet   Yes No   Sig: Take 1 tablet by mouth daily as needed for heartburn    calcium citrate-vitamin D (CITRACAL W/D) 250-100 MG-UNIT tablet   No No   Sig: Take 2 tablets by mouth 2 times daily (with meals)   cholecalciferol (VITAMIN D-1000 MAX ST) 25 MCG (1000 UT) TABS   Yes No   Sig: Take 4,000 Units by mouth   cyanocobalamin (VITAMIN B-12) 1000 MCG tablet   Yes No   Sig: Take 1,000 mcg by mouth daily    folic acid (FOLVITE) 1 MG tablet   Yes No   Sig: Take 1 mg by mouth daily    furosemide (LASIX) 40 MG tablet   Yes No   Sig: Take 40 mg by mouth 2 times daily    lactulose (CHRONULAC) 10 GM/15ML solution   Yes No   Sig: Take 20 g by mouth 2 times daily    levothyroxine (SYNTHROID) 175 MCG tablet   Yes No   Sig: Take 175 mcg by mouth every day except for Monday and Thursday   metFORMIN (GLUCOPHAGE) 1000 MG tablet   Yes No   Sig: Take 1,000 mg by mouth 2 times daily (with meals)    multivitamin (DEKAS ESSENTIAL) capsule   Yes No   Sig: Take 1 capsule by mouth daily    omeprazole (PRILOSEC) 20 MG DR capsule   Yes No   Sig: Take 20 mg by mouth daily    ondansetron (ZOFRAN-ODT) 4 MG ODT tab   Yes No   Sig: Place 4 mg under the tongue every 6 hours as needed for nausea    rifaximin (XIFAXAN) 550 MG TABS tablet   Yes No   Sig: Take 550 mg by mouth 2 times daily    spironolactone (ALDACTONE) 100 MG tablet   Yes No   Sig: Take 200 mg by mouth daily    traZODone (DESYREL) 100 MG tablet   Yes No   Sig: Take  mg by mouth nightly as needed for sleep   valACYclovir (VALTREX) 1000 mg tablet   Yes No   Sig: Take 1,000 mg by mouth  daily as needed (at onset of cold sore)    venlafaxine (EFFEXOR) 75 MG tablet   No No   Sig: Take 1 tablet (75 mg) by mouth At Bedtime   vitamin A 3 MG (43029 UNITS) capsule   Yes No   Sig: Take 10,000 Units by mouth daily    vitamin C (ASCORBIC ACID) 100 MG tablet   Yes No   Sig: Take 100 mg by mouth daily    vitamin D2 (ERGOCALCIFEROL) 39707 units (1250 mcg) capsule   No No   Sig: Take 1 capsule (50,000 Units) by mouth once a week for 10 doses   vitamin E (TOCOPHEROL) 400 units (180 mg) capsule   Yes No   Sig: Take 400 Units by mouth daily       Facility-Administered Medications: None     Allergies   Allergies   Allergen Reactions     Prednisone      Nsaids Other (See Comments)       Physical Exam   Vital Signs: Temp: 98.7  F (37.1  C) Temp src: Oral BP: 99/75 Pulse: 80   Resp: 18 SpO2: 99 % O2 Device: None (Room air)    Weight: 131 lbs 1.6 oz    General Appearance: Awake, alert and oriented x 3, forgetful. Well-developed, well-nourished female in no acute distress.  Eyes: Normal lids. Anicteric sclera. PERRL. EOMs intact. No nystagmus.  HEENT: Normocephalic, atraumatic. Nares patent. Oropharynx clear. Mucous membranes moist.  Respiratory: Normal work of breathing on room air. Lungs clear to auscultation bilaterally. No wheezes, rhonchi or rales.  Cardiovascular: RRR. S1, S2. No murmurs, rubs or gallops. Pedal pulses 2+ No lower extremity edema.  GI: Abdomen non-distended. Normoactive bowel sounds. Soft, mild diffuse tenderness throughout. No rebounding or guarding.  Lymph/Hematologic: No abnormal or excessive bruising.  Skin: Warm, dry. No jaundice. No rashes on exposed skin.  Musculoskeletal: Moves all extremities.  Neurologic: No focal deficits.  Psychiatric: Normal mood, normal affect.    Data   Data reviewed today: I reviewed all medications, new labs and imaging results over the last 24 hours.    Recent Labs   Lab 11/22/20  1406   WBC 5.0   HGB 11.1*   *   *   INR 1.17*      POTASSIUM 4.0    CHLORIDE 106   CO2 28   BUN 19   CR 0.95   ANIONGAP 4   MAURI 8.7   *   ALBUMIN 2.7*   PROTTOTAL 7.7   BILITOTAL 0.8   ALKPHOS 137   ALT 32   AST 42   LIPASE 13*   TROPI <0.015     Recent Results (from the past 24 hour(s))   XR Chest Port 1 View    Narrative    EXAM: XR CHEST PORT 1 VW  11/22/2020 2:43 PM     HISTORY:  generalized weakness, fatigue, concern for COVID, eval for  pathology       COMPARISON:  None    FINDINGS:   Portable upright AP radiograph of the chest. Lung volumes are low. The  trachea is midline. The cardiac silhouette size is normal. No focal  airspace consolidations. No pneumothorax. No pleural effusion.  Visualized upper abdomen is unremarkable. No acute osseous  abnormality.      Impression    IMPRESSION:   No acute cardiopulmonary disease.    I have personally reviewed the examination and initial interpretation  and I agree with the findings.    NUBIA ABRUE MD   CT Abdomen Pelvis w Contrast    Narrative    EXAMINATION: CT ABDOMEN PELVIS W CONTRAST  11/22/2020 3:11 PM      CLINICAL HISTORY: generalized abdominal pain, confusion, generalized  weakness, hx of CUETO cirrhosis, not needed paracentesis since June,  eval for pathology    COMPARISON: CT 10/26/2020, 6/4/2020 and MR 6/30/2020    PROCEDURE COMMENTS: CT of the abdomen was performed with Isovue 370  intravenous contrast. Coronal and sagittal reformatted images were  obtained.    FINDINGS:  Lower thorax:   The visualized portions of the lung bases are clear. The heart size is  normal.    Abdomen and pelvis:  Cirrhotic configuration of the liver without focal hepatic mass. The  gallbladder is normal appearance. Diffuse fatty replacement of the  pancreas. Splenomegaly. The adrenal glands are normal in appearance.  The kidneys are normal in appearance with no hydronephrosis. The  bladder is well-distended and appears unremarkable. Uterus and ovaries  are normal in appearance. No free intraperitoneal air.    Postoperative  changes of gastric bypass. No abnormally dilated loops  of large or small bowel. Mild colonic wall thickening. There is a  large colonic stool burden. The appendix is surgically absent. No  pneumatosis or portal venous gas. The infrarenal abdominal aorta is of  normal caliber. Mild atherosclerotic calcifications of the  intra-abdominal aorta. Multiple stable para-aortic lymph nodes,  largest measuring 1.1 cm (series 3 image 169). Stable enlarged lymph  nodes about the mary hepatis, largest measuring 2.1 cm (series 3  image 89). No enlarged inguinal lymph nodes.    Osseous structures:   Multilevel degenerative changes of the spine. No suspicious or  abnormal-appearing bone lesions.      Impression    IMPRESSION:  1. Mild colonic bowel wall thickening with a large colonic stool  burden, likely related to cirrhosis and portal hypertension.    2. Cirrhotic configuration of the liver with findings of portal  hypertension. No focal hepatic masses or substantial intra-abdominal  ascites.    3. Enlarged retroperitoneal and mary hepatis lymph nodes, stable from  6/4/2020.    I have personally reviewed the examination and initial interpretation  and I agree with the findings.    NUBIA ABREU MD   CT Head w/o Contrast    Narrative    CT HEAD W/O CONTRAST 11/22/2020 3:11 PM    History: weakness, confusion, worsening headaches, eval for bleed     Comparison: Brain MRI dated 8/24/2020    Technique: Using multidetector thin collimation helical acquisition  technique, axial, coronal and sagittal CT images from the skull base  to the vertex were obtained without intravenous contrast.    Findings: There is no intracranial hemorrhage, mass effect, or midline  shift. Gray/white matter differentiation in both cerebral hemispheres  is preserved. Ventricles are proportionate to the cerebral sulci. The  basal cisterns are clear.    The bony calvaria and the bones of the skull base are normal. The  visualized portions of the  paranasal sinuses and mastoid air cells are  clear.       Impression    Impression:  No acute intracranial pathology.          I have personally reviewed the examination and initial interpretation  and I agree with the findings.    ZAIN PAIGE MD

## 2020-11-22 NOTE — TELEPHONE ENCOUNTER
Susan paged that she is very shaky, very weak, fatigue. She is taking lactulose and she is having 3 bowel movements a day. No fevers, no cough, or sore throat. Pt is having frequent headaches, but not a new issue and this is a chronic issue. Pt has not been able to eat except a few bites.  Pt is unsteady, but does not get dizzy when she gets up. Pt's blood pressure 104/78 on her wrist.  She is foggy at times and having harder time to catch thoughts and has taken a few mins per family to come up with what she is trying to say at times, but not always.  Pt has not taken any lactulose today. Pt will take a dose and if she is not feeling better she will go in for evaluation.

## 2020-11-23 ENCOUNTER — TELEPHONE (OUTPATIENT)
Dept: TRANSPLANT | Facility: CLINIC | Age: 50
End: 2020-11-23

## 2020-11-23 ENCOUNTER — APPOINTMENT (OUTPATIENT)
Dept: PHYSICAL THERAPY | Facility: CLINIC | Age: 50
DRG: 441 | End: 2020-11-23
Attending: PHYSICIAN ASSISTANT
Payer: COMMERCIAL

## 2020-11-23 ENCOUNTER — APPOINTMENT (OUTPATIENT)
Dept: OCCUPATIONAL THERAPY | Facility: CLINIC | Age: 50
DRG: 441 | End: 2020-11-23
Attending: PHYSICIAN ASSISTANT
Payer: COMMERCIAL

## 2020-11-23 LAB
ALBUMIN SERPL-MCNC: 2.7 G/DL (ref 3.4–5)
ALP SERPL-CCNC: 122 U/L (ref 40–150)
ALT SERPL W P-5'-P-CCNC: 27 U/L (ref 0–50)
ANION GAP SERPL CALCULATED.3IONS-SCNC: 5 MMOL/L (ref 3–14)
AST SERPL W P-5'-P-CCNC: 36 U/L (ref 0–45)
BACTERIA SPEC CULT: NORMAL
BILIRUB SERPL-MCNC: 1.1 MG/DL (ref 0.2–1.3)
BUN SERPL-MCNC: 12 MG/DL (ref 7–30)
CALCIUM SERPL-MCNC: 8.5 MG/DL (ref 8.5–10.1)
CHLORIDE SERPL-SCNC: 106 MMOL/L (ref 94–109)
CO2 SERPL-SCNC: 28 MMOL/L (ref 20–32)
CREAT SERPL-MCNC: 0.84 MG/DL (ref 0.52–1.04)
ERYTHROCYTE [DISTWIDTH] IN BLOOD BY AUTOMATED COUNT: 14.6 % (ref 10–15)
GFR SERPL CREATININE-BSD FRML MDRD: 82 ML/MIN/{1.73_M2}
GLUCOSE BLDC GLUCOMTR-MCNC: 122 MG/DL (ref 70–99)
GLUCOSE BLDC GLUCOMTR-MCNC: 124 MG/DL (ref 70–99)
GLUCOSE BLDC GLUCOMTR-MCNC: 130 MG/DL (ref 70–99)
GLUCOSE BLDC GLUCOMTR-MCNC: 136 MG/DL (ref 70–99)
GLUCOSE BLDC GLUCOMTR-MCNC: 334 MG/DL (ref 70–99)
GLUCOSE SERPL-MCNC: 130 MG/DL (ref 70–99)
HCT VFR BLD AUTO: 28.9 % (ref 35–47)
HGB BLD-MCNC: 9.3 G/DL (ref 11.7–15.7)
LABORATORY COMMENT REPORT: NORMAL
Lab: NORMAL
MCH RBC QN AUTO: 34.8 PG (ref 26.5–33)
MCHC RBC AUTO-ENTMCNC: 32.2 G/DL (ref 31.5–36.5)
MCV RBC AUTO: 108 FL (ref 78–100)
PLATELET # BLD AUTO: 104 10E9/L (ref 150–450)
POTASSIUM SERPL-SCNC: 3.5 MMOL/L (ref 3.4–5.3)
PROT SERPL-MCNC: 6.6 G/DL (ref 6.8–8.8)
RBC # BLD AUTO: 2.67 10E12/L (ref 3.8–5.2)
SARS-COV-2 RNA SPEC QL NAA+PROBE: NEGATIVE
SODIUM SERPL-SCNC: 139 MMOL/L (ref 133–144)
SPECIMEN SOURCE: NORMAL
SPECIMEN SOURCE: NORMAL
WBC # BLD AUTO: 4.4 10E9/L (ref 4–11)

## 2020-11-23 PROCEDURE — 97165 OT EVAL LOW COMPLEX 30 MIN: CPT | Mod: GO

## 2020-11-23 PROCEDURE — 99233 SBSQ HOSP IP/OBS HIGH 50: CPT | Performed by: INTERNAL MEDICINE

## 2020-11-23 PROCEDURE — 97161 PT EVAL LOW COMPLEX 20 MIN: CPT | Mod: GP

## 2020-11-23 PROCEDURE — 97535 SELF CARE MNGMENT TRAINING: CPT | Mod: GO

## 2020-11-23 PROCEDURE — 250N000013 HC RX MED GY IP 250 OP 250 PS 637: Performed by: PHYSICIAN ASSISTANT

## 2020-11-23 PROCEDURE — 250N000013 HC RX MED GY IP 250 OP 250 PS 637: Performed by: INTERNAL MEDICINE

## 2020-11-23 PROCEDURE — 120N000002 HC R&B MED SURG/OB UMMC

## 2020-11-23 PROCEDURE — 250N000011 HC RX IP 250 OP 636: Performed by: PHYSICIAN ASSISTANT

## 2020-11-23 PROCEDURE — 999N001017 HC STATISTIC GLUCOSE BY METER IP

## 2020-11-23 PROCEDURE — 80053 COMPREHEN METABOLIC PANEL: CPT | Performed by: PHYSICIAN ASSISTANT

## 2020-11-23 PROCEDURE — 97116 GAIT TRAINING THERAPY: CPT | Mod: GP

## 2020-11-23 PROCEDURE — 85027 COMPLETE CBC AUTOMATED: CPT | Performed by: PHYSICIAN ASSISTANT

## 2020-11-23 PROCEDURE — 36415 COLL VENOUS BLD VENIPUNCTURE: CPT | Performed by: PHYSICIAN ASSISTANT

## 2020-11-23 PROCEDURE — 97530 THERAPEUTIC ACTIVITIES: CPT | Mod: GO

## 2020-11-23 PROCEDURE — 97110 THERAPEUTIC EXERCISES: CPT | Mod: GP

## 2020-11-23 RX ORDER — LACTULOSE 10 G/15ML
10 SOLUTION ORAL 3 TIMES DAILY
Status: DISCONTINUED | OUTPATIENT
Start: 2020-11-23 | End: 2020-11-23

## 2020-11-23 RX ORDER — LACTULOSE 10 G/15ML
20 SOLUTION ORAL 3 TIMES DAILY
Status: DISCONTINUED | OUTPATIENT
Start: 2020-11-23 | End: 2020-11-25 | Stop reason: HOSPADM

## 2020-11-23 RX ORDER — ACETAMINOPHEN 325 MG/1
650 TABLET ORAL EVERY 6 HOURS PRN
Status: DISCONTINUED | OUTPATIENT
Start: 2020-11-23 | End: 2020-11-25 | Stop reason: HOSPADM

## 2020-11-23 RX ADMIN — RIFAXIMIN 550 MG: 550 TABLET ORAL at 22:37

## 2020-11-23 RX ADMIN — CALCIUM CARBONATE (ANTACID) CHEW TAB 500 MG 500 MG: 500 CHEW TAB at 19:59

## 2020-11-23 RX ADMIN — VENLAFAXINE 75 MG: 75 TABLET ORAL at 22:38

## 2020-11-23 RX ADMIN — FOLIC ACID 1 MG: 1 TABLET ORAL at 08:29

## 2020-11-23 RX ADMIN — RIFAXIMIN 550 MG: 550 TABLET ORAL at 08:29

## 2020-11-23 RX ADMIN — Medication 4000 UNITS: at 08:29

## 2020-11-23 RX ADMIN — Medication 2 TABLET: at 17:42

## 2020-11-23 RX ADMIN — VITAMIN E CAP 400 UNIT 400 UNITS: 400 CAP at 08:28

## 2020-11-23 RX ADMIN — LEVOTHYROXINE SODIUM 175 MCG: 0.1 TABLET ORAL at 08:29

## 2020-11-23 RX ADMIN — Medication 2 TABLET: at 08:28

## 2020-11-23 RX ADMIN — LACTULOSE 20 G: 20 SOLUTION ORAL at 22:37

## 2020-11-23 RX ADMIN — ENOXAPARIN SODIUM 40 MG: 40 INJECTION SUBCUTANEOUS at 17:41

## 2020-11-23 RX ADMIN — Medication 125 MG: at 08:28

## 2020-11-23 RX ADMIN — OMEPRAZOLE 20 MG: 20 CAPSULE, DELAYED RELEASE ORAL at 08:29

## 2020-11-23 RX ADMIN — ACETAMINOPHEN 650 MG: 325 TABLET, FILM COATED ORAL at 05:39

## 2020-11-23 RX ADMIN — THERA TABS 1 TABLET: TAB at 08:29

## 2020-11-23 RX ADMIN — ACETAMINOPHEN 650 MG: 325 TABLET, FILM COATED ORAL at 22:38

## 2020-11-23 RX ADMIN — SITAGLIPTIN 100 MG: 100 TABLET, FILM COATED ORAL at 08:28

## 2020-11-23 RX ADMIN — ATORVASTATIN CALCIUM 20 MG: 10 TABLET, FILM COATED ORAL at 08:28

## 2020-11-23 RX ADMIN — Medication 0.1 MG: at 08:29

## 2020-11-23 RX ADMIN — CYANOCOBALAMIN TAB 1000 MCG 1000 MCG: 1000 TAB at 08:29

## 2020-11-23 RX ADMIN — Medication 10000 UNITS: at 08:28

## 2020-11-23 RX ADMIN — FERROUS SULFATE TAB 325 MG (65 MG ELEMENTAL FE) 325 MG: 325 (65 FE) TAB at 08:29

## 2020-11-23 RX ADMIN — LACTULOSE 20 G: 20 SOLUTION ORAL at 18:03

## 2020-11-23 ASSESSMENT — ACTIVITIES OF DAILY LIVING (ADL)
ADLS_ACUITY_SCORE: 17

## 2020-11-23 NOTE — PROGRESS NOTES
"SPIRITUAL HEALTH SERVICES  SPIRITUAL ASSESSMENT Progress Note  [facility] ([place] 5A     REFERRAL SOURCE: Pt request    Pt noted that her main support system includes her , her mother-in-law, her mom, and her cousin's wife who runs her [employer] website as they look for a living liver donor. She expressed hope that \"her son wants to be a donor, but needs work to slow down a little first.\" She wants people to know that \"this (her diagnosis)  isn't because of alcoholism.\"     We talked about themes of loneliness and feeling forgotten. And how \"it's easy to be lost in the crowd\" of a big Holiness.     Pt is also a mom to her late sister's sons, as well as her 's two daughters \"who are like her own, and her sons are her own.\" After struggling with infertility she noted that \"it was God's plan, that she was always a mom.\" and that now with her diagnosis, \"she's just waiting to see what God's plan is for this.\"    Pt expressed a desire to fill out an advanced health care directive, and assistance with how to have those conversations with her family. Per pt's request,  I will be providing her with a copy to take notes on and helping her to strategize before meeting with her family. I provided spiritual and emotional support.     PLAN: Will follow per pt request. Spiritual health services remains available for any follow-up or requests    Chelsie Roman Resident  Pager: [phone]    "

## 2020-11-23 NOTE — PROGRESS NOTES
11/23/20 1530   Quick Adds   Type of Visit Initial PT Evaluation      Language English   Living Environment   People in home spouse   Current Living Arrangements house   Home Accessibility no concerns   Transportation Anticipated family or friend will provide   Living Environment Comments Pt lives in single level home with . 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 Her  is a  at a school and works varying hours, but usually in the evening. She has a dog that she likes to walk in the backyard. She reports anticipating a live donor for her liver transplant.    Self-Care   Usual Activity Tolerance moderate   Current Activity Tolerance fair   Regular Exercise No   Equipment Currently Used at Home none   Activity/Exercise/Self-Care Comment Pt is usually IND for all ADLs and IADLs. Her  does the laundry since it is in the basement of the home. She reports increased fatigue/weakness over the last year.    Disability/Function   Hearing Difficulty or Deaf no   Wear Glasses or Blind yes   Concentrating, Remembering or Making Decisions Difficulty yes   Difficulty Communicating no   Difficulty Eating/Swallowing no   Walking or Climbing Stairs Difficulty no   Dressing/Bathing Difficulty no   Toileting no   Doing Errands Independently Difficulty (such as shopping) no   Fall history within last six months no   Number of times patient has fallen within last six months 0   Change in Functional Status Since Onset of Current Illness/Injury yes   General Information   Onset of Illness/Injury or Date of Surgery 11/22/20  (date of admission)   Referring Physician Halie Landa PA-C   Patient/Family Therapy Goals Statement (PT) To get stronger   Pertinent History of Current Problem (include personal factors and/or comorbidities that impact the POC) Pt is a 50 year old female admitted on 11/22/2020. She has a past medical history significant for DM2, gastric ulcers, primary  hypothyroidism, liver cirrhosis 2/2 CUETO c/b hepatic encephalopathy, depression, and h/o Faustino en Y gastric bypass who presented with fatigue, confusion and generalized weakness. She is admitted to medicine for acute hepatic encephalopathy.  - per chart review   Existing Precautions/Restrictions fall   General Observations Pt sitting up in bed, pleasant, and agreeable to therapy   Cognition   Orientation Status (Cognition) oriented x 4   Affect/Mental Status (Cognition) WFL   Follows Commands (Cognition) WNL   Cognitive Status Comments Pt reporting difficulty with med management at home. '   Pain Assessment   Patient Currently in Pain No   Integumentary/Edema   Integumentary/Edema no deficits were identifed   Posture    Posture Forward head position;Protracted shoulders   Range of Motion (ROM)   ROM Quick Adds ROM WNL   Strength   Manual Muscle Testing Quick Adds Deficits observed during functional mobility   Strength Comments Proximal LE weakness noted.    Bed Mobility   Comment (Bed Mobility) IND   Transfers   Transfer Safety Comments IND   Gait/Stairs (Locomotion)   Comment (Gait/Stairs) Pt ambulating with CGA, wide SONIA, decreased stance time on L LE.    Balance   Balance Comments CGA for gait without AD.    Sensory Examination   Sensory Perception Comments Pt denies N/T   Clinical Impression   Criteria for Skilled Therapeutic Intervention yes, treatment indicated   PT Diagnosis (PT) Impaired functional endurance   Influenced by the following impairments Impaired endurance, strength, high level balance   Functional limitations due to impairments Impaired endurance, limiting return to [facility] at [facility]   Clinical Presentation Stable/Uncomplicated   Clinical Presentation Rationale Per clinical judgement   Clinical Decision Making (Complexity) low complexity   Therapy Frequency (PT) 4x/week   Predicted Duration of Therapy Intervention (days/wks) 1 week   Planned Therapy Interventions (PT) balance training;gait  training;home exercise program;neuromuscular re-education;postural re-education;ROM (range of motion);stair training;strengthening;stretching   Anticipated Equipment Needs at Discharge (PT)   (none)   Risk & Benefits of therapy have been explained evaluation/treatment results reviewed;care plan/treatment goals reviewed;risks/benefits reviewed;current/potential barriers reviewed;participants voiced agreement with care plan;participants included;patient   Clinical Impression Comments Pt is mobilizing near her baseline, but with definite proximal weakness and impaired endurance.    PT Discharge Planning    PT Discharge Recommendation (DC Rec) home with outpatient physical therapy   PT Rationale for DC Rec Pt will be safe to discharge home with OP PT when medically appropriate. Pt is limited by weakness, impaired endurance, and impaired high level balance. Especially considering pt has plans for liver transplant, she would greatly benefit from any conditioning to maximize recovery.    PT Brief overview of current status  Supervision in halls   Total Evaluation Time   Total Evaluation Time (Minutes) 10

## 2020-11-23 NOTE — PROGRESS NOTES
[facility]     Medicine Progress Note - Hospitalist Service, Gold 8       Date of Admission:  11/22/2020  Assessment & Plan       Susan Puckett is a 50 year old female admitted on 11/22/2020. She has a past medical history significant for DM2, gastric ulcers, primary hypothyroidism, liver cirrhosis 2/2 CUETO c/b hepatic encephalopathy, depression, and h/o Faustino en Y gastric bypass who presented with fatigue, confusion and generalized weakness. She is admitted to medicine for acute hepatic encephalopathy.      Acute hepatic encephalopathy  Liver cirrhosis due to CUETO  Presenting with confusion and generalize weakness. Ammonia 122. Lactate 2.4, INR 1.17. PTA on lactulose 10 mg TID  - CT head and CT A/P without acute pathology except for large stool burden  - Low suspicion for bacterial infection precipitating worsening encephalopathy as no leukocytosis and CRP wnl,  - Awaiting liver transplant, follows with Dr. Cook.  - Hold PTA lasix and spironolactone given soft pressures   - pta rifaximin 550 mg BID  - Cont WH protocol     COVID PUI  - Has no respiratory symptoms, afebrile and had a negative test two weeks ago after an exposure  - COVID negative 11/23  - low suspicion     Hypothyroidism  TSH low 0.08 on admission, T4 free wnl 1.28. Had recent virtual visit with Endo who reduced her levothyroxine to 175 mcg dialy.  - continue pta levothyroxine 175 mcg     DM2  Recent virtual visit with Endocrine; discontinued metformin due to liver disease. Started on Januvia. Glucose 379 on admission.  - continued pta januvia 100 mg daily  - SS     GERD  H/o gastric ulcers  - pta omeprazole 20 mg daily     Hyperlipidemia  - pta atorvastatin 20mg daily     Chronic macrocytic anemia  S/p Faustino en Y gastric bypass   Hgb 11.1, . Stable, at baseline. Anemia likely multifactorial in setting of chronic liver disease and previous gastric bypass surgery.  - pta vitamin B12 1000 mcg daily,  ryya186 mg daily, folate 1 mg daily  - pta multivitamin, Vit A, C, D2, E, K      Chronic thrombocytopenia  Ptls 125 on admission. Stable, at baseline. Likely secondary to liver disease.  - monitor     Anxiety  Depression  - pta venlafaxine 5 mg at bedtime      Insomnia  - holding pta trazodone in setting of encephalopathy       Diet: 2 Gram Sodium Diet    DVT Prophylaxis: Enoxaparin (Lovenox) SQ  Rossi Catheter: not present  Code Status: Full Code           Disposition Plan   Expected discharge: 2 - 3 days, recommended to prior living arrangement once mental status at baseline.  Entered: Paulie Buck MD 11/23/2020, 3:15 PM       The patient's care was discussed with the Bedside Nurse, Care Coordinator/ and Patient.    Paulie Buck MD  Hospitalist Service, [address]   Contact information available via [facility] Paging/Directory  Please see sign in/sign out for up to date coverage information  ______________________________________________________________________    Interval History   No acute events overnight, significantly improved mental status today but continues to feel weak    4 point ROS otherwise negative     Data reviewed today: I reviewed all medications, new labs and imaging results over the last 24 hours. I personally reviewed no images or EKG's today.    Physical Exam   Vital Signs: Temp: 95.7  F (35.4  C) Temp src: Axillary BP: 106/75 Pulse: 80   Resp: 16 SpO2: 100 % O2 Device: None (Room air)    Weight: 130 lbs 15.25 oz    Constitutional: Awake, alert, cooperative, no apparent distress  Respiratory: Clear to auscultation bilaterally  Cardiovascular: Regular rate and rhythm, normal S1 and S2, and no murmur noted  GI: Normal bowel sounds, soft, non-distended, non-tender  Skin/Integumen: No rashes, no cyanosis, no edema      Data   Recent Labs   Lab 11/23/20  0657 11/22/20  1406   WBC 4.4 5.0   HGB 9.3* 11.1*   * 109*   *  125*   INR  --  1.17*    138   POTASSIUM 3.5 4.0   CHLORIDE 106 106   CO2 28 28   BUN 12 19   CR 0.84 0.95   ANIONGAP 5 4   MAURI 8.5 8.7   * 379*   ALBUMIN 2.7* 2.7*   PROTTOTAL 6.6* 7.7   BILITOTAL 1.1 0.8   ALKPHOS 122 137   ALT 27 32   AST 36 42   LIPASE  --  13*   TROPI  --  <0.015     No results found for this or any previous visit (from the past 24 hour(s)).

## 2020-11-23 NOTE — PROGRESS NOTES
Sepsis Evaluation Progress Note    I was called to see Susan Puckett due to Lactic acid 2.5. She is not known to have an infection.     Physical Exam   Vital Signs:  Temp: 96.3  F (35.7  C) Temp src: Oral BP: 97/67 Pulse: 80   Resp: 16 SpO2: 100 % O2 Device: None (Room air)       General: in no acute distress  Mental Status: altered level of consciousness based on admission note, with hepatic encephalopathy.     Data   Lactic Acid   Date Value Ref Range Status   11/22/2020 2.5 (H) 0.7 - 2.0 mmol/L Final   11/22/2020 2.4 (H) 0.7 - 2.0 mmol/L Final       Assessment & Plan   NO EVIDENCE OF SEPSIS at this time.  Vital sign, physical exam, and lab findings are likely due to underlying ESLD.    Disposition: The patient will remain on the current unit. We will continue to monitor this patient closely..  Rubi Parikh MD    Sepsis Criteria   Sepsis: 2+ SIRS criteria due to infection  Severe Sepsis: Sepsis AND 1+ new sign of acute organ dysfunction (Note: lactate >2 or acute encephalopathy each qualify as organ dysfunction)  Septic Shock: Sepsis AND hypotension despite volume resuscitation with 30 ml/kg crystalloid or lactate >=4  Note: HYPOTENSION is defined as 2 BP readings measured 3 hrs apart that have a SBP <90, MAP <65, or decrease >40 mmHg, occurring 6 hrs before or after t-zero

## 2020-11-23 NOTE — PLAN OF CARE
Patient states she still feels a bit cloudy mentally.  Up independently.   Relieved to know that she is negative for COVID.  Blood pressures soft, lasix and spironolactone held this am per MD orders.  Liver transplant team possibly to see patient today.  Patient called her clinic to let them know that she is here.  Glucoses 124, and 122 this morning.  Continue to monitor.

## 2020-11-23 NOTE — PLAN OF CARE
Pt admitted from ED for hepatic encephalopathy and COVID PUI. A&Ox4. VSS on RA except soft BPs. Has soft BPs at baseline per pt. Up ind. Covid result pending. Scored 0 on [place]. , MD notified. Tylenol given for headache. 2 stools this shift. Voiding. R PIV-SL. Will cont to monitor.

## 2020-11-23 NOTE — PROGRESS NOTES
11/23/20 0852   Quick Adds   Type of Visit Initial Occupational Therapy Evaluation       Present no   Living Environment   People in home child(suresh), adult   Current Living Arrangements house   Home Accessibility no concerns   Transportation Anticipated family or friend will provide   Living Environment Comments Pt lives in one story home w/ basement, does not need to use stairs. Tub shower, no shower chair.    Self-Care   Usual Activity Tolerance moderate   Current Activity Tolerance fair   Regular Exercise No   Equipment Currently Used at Home none   Activity/Exercise/Self-Care Comment Pt reports no regular exercise though does walk her dog around the backyard, states this is exercise for her too. Stating she has felt weaker progressively over the year, is concerned about falling. Pt's waits for  to be home when showering for safety. Otherwise independent with all ADL   Disability/Function   Hearing Difficulty or Deaf no   Wear Glasses or Blind yes   Vision Management Glasses for reading   Concentrating, Remembering or Making Decisions Difficulty yes   Difficulty Communicating no   Difficulty Eating/Swallowing no   Walking or Climbing Stairs Difficulty no   Dressing/Bathing Difficulty no   Toileting no   Doing Errands Independently Difficulty (such as shopping) no   Fall history within last six months no  (almost fell recently d/t weakness)   Number of times patient has fallen within last six months 0   Change in Functional Status Since Onset of Current Illness/Injury yes   General Information   Onset of Illness/Injury or Date of Surgery 11/22/20   Referring Physician Paulie Buck MD   Patient/Family Therapy Goal Statement (OT) Return to PLOF, gain strength   Additional Occupational Profile Info/Pertinent History of Current Problem Susan Puckett is a 50 year old female admitted on 11/22/2020. She has a past medical history significant for DM2, gastric ulcers, primary  "hypothyroidism, liver cirrhosis 2/2 CUETO c/b hepatic encephalopathy, depression, and h/o Faustino en Y gastric bypass who presented with fatigue, confusion and generalized weakness. She is admitted to medicine for acute hepatic encephalopathy and COVID PUI.   Existing Precautions/Restrictions fall   Limitations/Impairments safety/cognitive  (Encephalopathy, appears WFL at this time)   Left Upper Extremity (Weight-bearing Status) full weight-bearing (FWB)   Right Upper Extremity (Weight-bearing Status) full weight-bearing (FWB)   Left Lower Extremity (Weight-bearing Status) full weight-bearing (FWB)   Right Lower Extremity (Weight-bearing Status) full weight-bearing (FWB)   General Observations and Info Pt ambulates slowly/cautiously, no AD use at baseline.    Cognitive Status Examination   Orientation Status orientation to person, place and time   Attention Deficit minimal deficit   Cognitive Status Comments Pt's largest complaint is feeling \"foggy\", having difficulty attending fully to tasks. Oriented to person, place, time, location. Cognition appears WFL at this time, will continue to monitor.    Visual Perception   Visual Impairment/Limitations corrective lenses for reading   Sensory   Sensory Quick Adds No deficits were identified   Integumentary/Edema   Integumentary/Edema no deficits were identifed   Range of Motion Comprehensive   General Range of Motion no range of motion deficits identified   Strength Comprehensive (MMT)   General Manual Muscle Testing (MMT) Assessment upper extremity strength deficits identified   Comment, General Manual Muscle Testing (MMT) Assessment BUE grossly 4-/5    Coordination   Upper Extremity Coordination Left UE impaired;Right UE impaired   Fine Motor Coordination Some difficulty with fine motor tasks   Coordination Comments Hand weakness noted   Instrumental Activities of Daily Living (IADL)   Previous Responsibilities driving;meal prep;housekeeping;laundry;medication management "   IADL Comments Pt's  able to assist as needed, does majority of driving.  manages finances, assists as needed with medication management.    Clinical Impression   Criteria for Skilled Therapeutic Interventions Met (OT) yes   OT Diagnosis Decreased ADL I   OT Problem List-Impairments impacting ADL problems related to;activity tolerance impaired;cognition;strength;pain   Assessment of Occupational Performance 3-5 Performance Deficits   Identified Performance Deficits Dressing, bathing, toileting, home mgmt   Planned Therapy Interventions (OT) ADL retraining;IADL retraining;cognition;strengthening;home program guidelines;progressive activity/exercise;risk factor education   Clinical Decision Making Complexity (OT) low complexity   Therapy Frequency (OT) 5x/week   Predicted Duration of Therapy 1 week   Anticipated Equipment Needs Upon Discharge (OT) shower chair   Risks and Benefits of Treatment have been explained. Yes   Patient, Family & other staff in agreement with plan of care Yes   Comment-Clinical Impression Pt will benefit from skilled OT services while inpatient to progress ADL I and support safe discharge plan   OT Discharge Planning    OT Discharge Recommendation (DC Rec) home with outpatient occupational therapy;Home with assist   OT Rationale for DC Rec Pt is close SBA for functional mobility 2/2 generalized weakness, cognition appears grossly WFL though pt endorses attention deficits. Pt will benefit from OP OT services to progress ADL/IADL I and support safety at home.    Total Evaluation Time (Minutes)   Total Evaluation Time (Minutes) 5

## 2020-11-23 NOTE — PROGRESS NOTES
[facility] Note    Patient requesting to resuming home TID lactulose. It appears that patient was started on [place] protocol on admission and home lactulose was not ordered. Per RN patient is not scoring high enough to receive PRN doses.     Reordered patient's home lactulose 10g TID.       Ketty Canales PA-C  Hospitalist Service  [facility] Pager: page job code [phone] via [facility]

## 2020-11-24 ENCOUNTER — APPOINTMENT (OUTPATIENT)
Dept: PHYSICAL THERAPY | Facility: CLINIC | Age: 50
DRG: 441 | End: 2020-11-24
Payer: COMMERCIAL

## 2020-11-24 ENCOUNTER — APPOINTMENT (OUTPATIENT)
Dept: OCCUPATIONAL THERAPY | Facility: CLINIC | Age: 50
DRG: 441 | End: 2020-11-24
Payer: COMMERCIAL

## 2020-11-24 LAB
ALBUMIN SERPL-MCNC: 2.6 G/DL (ref 3.4–5)
ALP SERPL-CCNC: 124 U/L (ref 40–150)
ALT SERPL W P-5'-P-CCNC: 29 U/L (ref 0–50)
ANION GAP SERPL CALCULATED.3IONS-SCNC: 6 MMOL/L (ref 3–14)
AST SERPL W P-5'-P-CCNC: 41 U/L (ref 0–45)
BILIRUB SERPL-MCNC: 0.8 MG/DL (ref 0.2–1.3)
BUN SERPL-MCNC: 11 MG/DL (ref 7–30)
CALCIUM SERPL-MCNC: 9 MG/DL (ref 8.5–10.1)
CHLORIDE SERPL-SCNC: 107 MMOL/L (ref 94–109)
CO2 SERPL-SCNC: 26 MMOL/L (ref 20–32)
CREAT SERPL-MCNC: 0.78 MG/DL (ref 0.52–1.04)
ERYTHROCYTE [DISTWIDTH] IN BLOOD BY AUTOMATED COUNT: 14.3 % (ref 10–15)
GFR SERPL CREATININE-BSD FRML MDRD: 90 ML/MIN/{1.73_M2}
GLUCOSE BLDC GLUCOMTR-MCNC: 111 MG/DL (ref 70–99)
GLUCOSE BLDC GLUCOMTR-MCNC: 117 MG/DL (ref 70–99)
GLUCOSE BLDC GLUCOMTR-MCNC: 132 MG/DL (ref 70–99)
GLUCOSE BLDC GLUCOMTR-MCNC: 165 MG/DL (ref 70–99)
GLUCOSE BLDC GLUCOMTR-MCNC: 215 MG/DL (ref 70–99)
GLUCOSE SERPL-MCNC: 141 MG/DL (ref 70–99)
HCT VFR BLD AUTO: 28.3 % (ref 35–47)
HGB BLD-MCNC: 9.5 G/DL (ref 11.7–15.7)
MAGNESIUM SERPL-MCNC: 1.7 MG/DL (ref 1.6–2.3)
MCH RBC QN AUTO: 35.7 PG (ref 26.5–33)
MCHC RBC AUTO-ENTMCNC: 33.6 G/DL (ref 31.5–36.5)
MCV RBC AUTO: 106 FL (ref 78–100)
PLATELET # BLD AUTO: 100 10E9/L (ref 150–450)
POTASSIUM SERPL-SCNC: 3.8 MMOL/L (ref 3.4–5.3)
PROT SERPL-MCNC: 6.4 G/DL (ref 6.8–8.8)
RBC # BLD AUTO: 2.66 10E12/L (ref 3.8–5.2)
RETICS # AUTO: 66 10E9/L (ref 25–95)
RETICS/RBC NFR AUTO: 2.5 % (ref 0.5–2)
SODIUM SERPL-SCNC: 139 MMOL/L (ref 133–144)
WBC # BLD AUTO: 4.1 10E9/L (ref 4–11)

## 2020-11-24 PROCEDURE — 97110 THERAPEUTIC EXERCISES: CPT | Mod: GO

## 2020-11-24 PROCEDURE — 250N000011 HC RX IP 250 OP 636: Performed by: INTERNAL MEDICINE

## 2020-11-24 PROCEDURE — C9113 INJ PANTOPRAZOLE SODIUM, VIA: HCPCS | Performed by: INTERNAL MEDICINE

## 2020-11-24 PROCEDURE — 99232 SBSQ HOSP IP/OBS MODERATE 35: CPT | Performed by: INTERNAL MEDICINE

## 2020-11-24 PROCEDURE — 83735 ASSAY OF MAGNESIUM: CPT | Performed by: INTERNAL MEDICINE

## 2020-11-24 PROCEDURE — 120N000002 HC R&B MED SURG/OB UMMC

## 2020-11-24 PROCEDURE — 97530 THERAPEUTIC ACTIVITIES: CPT | Mod: GP

## 2020-11-24 PROCEDURE — 85027 COMPLETE CBC AUTOMATED: CPT | Performed by: INTERNAL MEDICINE

## 2020-11-24 PROCEDURE — 250N000013 HC RX MED GY IP 250 OP 250 PS 637: Performed by: INTERNAL MEDICINE

## 2020-11-24 PROCEDURE — 250N000013 HC RX MED GY IP 250 OP 250 PS 637: Performed by: PHYSICIAN ASSISTANT

## 2020-11-24 PROCEDURE — 80053 COMPREHEN METABOLIC PANEL: CPT | Performed by: INTERNAL MEDICINE

## 2020-11-24 PROCEDURE — 99222 1ST HOSP IP/OBS MODERATE 55: CPT | Mod: GC | Performed by: INTERNAL MEDICINE

## 2020-11-24 PROCEDURE — 999N001017 HC STATISTIC GLUCOSE BY METER IP

## 2020-11-24 PROCEDURE — 85045 AUTOMATED RETICULOCYTE COUNT: CPT | Performed by: INTERNAL MEDICINE

## 2020-11-24 PROCEDURE — 36415 COLL VENOUS BLD VENIPUNCTURE: CPT | Performed by: INTERNAL MEDICINE

## 2020-11-24 PROCEDURE — 97535 SELF CARE MNGMENT TRAINING: CPT | Mod: GO

## 2020-11-24 PROCEDURE — 250N000011 HC RX IP 250 OP 636: Performed by: PHYSICIAN ASSISTANT

## 2020-11-24 RX ORDER — HYDROXYZINE HYDROCHLORIDE 25 MG/1
25 TABLET, FILM COATED ORAL
Status: COMPLETED | OUTPATIENT
Start: 2020-11-24 | End: 2020-11-24

## 2020-11-24 RX ADMIN — Medication 0.1 MG: at 08:19

## 2020-11-24 RX ADMIN — LACTULOSE 20 G: 20 SOLUTION ORAL at 14:25

## 2020-11-24 RX ADMIN — Medication 4000 UNITS: at 08:19

## 2020-11-24 RX ADMIN — FOLIC ACID 1 MG: 1 TABLET ORAL at 08:18

## 2020-11-24 RX ADMIN — LEVOTHYROXINE SODIUM 175 MCG: 0.1 TABLET ORAL at 08:18

## 2020-11-24 RX ADMIN — THERA TABS 1 TABLET: TAB at 08:18

## 2020-11-24 RX ADMIN — ATORVASTATIN CALCIUM 20 MG: 10 TABLET, FILM COATED ORAL at 08:19

## 2020-11-24 RX ADMIN — HYDROXYZINE HYDROCHLORIDE 25 MG: 25 TABLET, FILM COATED ORAL at 03:11

## 2020-11-24 RX ADMIN — Medication 10000 UNITS: at 08:18

## 2020-11-24 RX ADMIN — RIFAXIMIN 550 MG: 550 TABLET ORAL at 08:18

## 2020-11-24 RX ADMIN — CYANOCOBALAMIN TAB 1000 MCG 1000 MCG: 1000 TAB at 08:19

## 2020-11-24 RX ADMIN — VITAMIN E CAP 400 UNIT 400 UNITS: 400 CAP at 08:18

## 2020-11-24 RX ADMIN — SITAGLIPTIN 100 MG: 100 TABLET, FILM COATED ORAL at 08:18

## 2020-11-24 RX ADMIN — OMEPRAZOLE 20 MG: 20 CAPSULE, DELAYED RELEASE ORAL at 08:18

## 2020-11-24 RX ADMIN — LACTULOSE 20 G: 20 SOLUTION ORAL at 08:19

## 2020-11-24 RX ADMIN — FERROUS SULFATE TAB 325 MG (65 MG ELEMENTAL FE) 325 MG: 325 (65 FE) TAB at 08:18

## 2020-11-24 RX ADMIN — LACTULOSE 20 G: 20 SOLUTION ORAL at 21:13

## 2020-11-24 RX ADMIN — PANTOPRAZOLE SODIUM 40 MG: 40 INJECTION, POWDER, FOR SOLUTION INTRAVENOUS at 21:14

## 2020-11-24 RX ADMIN — RIFAXIMIN 550 MG: 550 TABLET ORAL at 21:13

## 2020-11-24 RX ADMIN — Medication 2 TABLET: at 08:19

## 2020-11-24 RX ADMIN — ONDANSETRON 4 MG: 2 INJECTION INTRAMUSCULAR; INTRAVENOUS at 10:37

## 2020-11-24 RX ADMIN — Medication 125 MG: at 08:18

## 2020-11-24 ASSESSMENT — ACTIVITIES OF DAILY LIVING (ADL)
ADLS_ACUITY_SCORE: 17

## 2020-11-24 NOTE — PROGRESS NOTES
SPIRITUAL HEALTH SERVICES  SPIRITUAL ASSESSMENT Progress Note  [facility] 5A     REFERRAL SOURCE: pt request for follow up    We went over the Advanced Care Directive and answered any questions she had. Pt shared stories of the deaths of her father and sister. We strategized on how to involve her family and young children in the conversations surrounding end of life and other medical decisions. I provided spiritual and emotional support.     PLAN: Spiritual health services remains available for any follow-up or requests      Chelsie Kang  Chaplain Resident  Pager: [phone]

## 2020-11-24 NOTE — CONSULTS
[facility]    Hepatology Consult    Requesting provider: Dr Elham Cleary, Internal Medicine    Consult requested for Hepatic Encephalopathy, acute on chronic anemia    HPI:  50 year old female with hx of CUETO Cirrhosis with HE, hx of Faustino-en-Y, T2DM, hx of gastric ulcers, hypothyroidism,. Who is admitted with encephalopathy.    She was admitted on 11/22 with confusion. At the time of this visit, she was awake and alert and able to provide full history.    Patient reports that she had been feeling slightly confused for couple days before coming to the hospital.  Denies any fevers or chills.  Does complain of abdominal pain diffusely but more predominant on the right upper and lower quadrants.  Reports of having nausea and poor appetite for many weeks to months, and also reports having significant weight loss up to 30 pounds.  Reports that she has not been able to take much in because of lack of appetite, also reports that she has mild dysphagia as well as odynophagia.  Denies having hematemesis, vomiting, melena or hematochezia.  Stools have been brown, loose, about 3 episodes per day.  Takes lactulose and rifaximin at home.    Since being here at the hospital, she felt like she got a little  better yesterday, but today she feels more confused again.  She reports having 1 bowel movement 1 day ago, and 1 bowel movement today since presenting.  No reports of melena or hematochezia here in the hospital.      Cirrhosis History:  Etiology: CUETO  Primary Hepatologist: Dr Cook, last visit 9/28/2020  MELD-Na: 8  Portal hypertension: Present  HE: 3 episodes of hepatic encephalopathy with hospitalization in the past, this admission is next hospitalization  Ascites: Patient history of ascites and hepatohydrothorax, had last paracentesis in June, had thoracentesis at the same time.  Has been on diuretics, most recent CT shows minimal to no ascites.  Coagulopathy: Mild with INR at 1.17  Varices: Last  EGD was August 2020 with grade 1 varices  HCC Screening: Last liver ultrasound evaluation August 2020 with no mass focal lesions  Any Thrombosis: No thrombosis  Last US: October 25th  Transplant Candidacy: Currently being evaluated    MELD-Na score: 8 at 11/24/2020  6:45 AM  MELD score: 8 at 11/24/2020  6:45 AM  Calculated from:  Serum Creatinine: 0.78 mg/dL (Rounded to 1 mg/dL) at 11/24/2020  6:45 AM  Serum Sodium: 139 mmol/L (Rounded to 137 mmol/L) at 11/24/2020  6:45 AM  Total Bilirubin: 0.8 mg/dL (Rounded to 1 mg/dL) at 11/24/2020  6:45 AM  INR(ratio): 1.17 at 11/22/2020  2:06 PM  Age: 50 years 4 months      Medical hx Surgical hx   Past Medical History:   Diagnosis Date     Depressive disorder      Diabetes (H)     Type 2 DM/No Insulin      History of blood transfusion     10/2019     Liver cirrhosis secondary to CUETO (H) 05/28/2020     Thyroid disease       Past Surgical History:   Procedure Laterality Date     APPENDECTOMY      1989     COLONOSCOPY      1/2020 at [facility]      ENT SURGERY       GI SURGERY      EGD August 2020 at [facility]      GYN SURGERY      2010     RNY gastric bypass  01/2006    retoocolic, retrogastric, [facility]     VASCULAR SURGERY            Medications  Current Facility-Administered Medications   Medication Dose Route Frequency     ascorbic acid  125 mg Oral Daily     atorvastatin  20 mg Oral Daily     calcium citrate-vitamin D  2 tablet Oral BID w/meals     cyanocobalamin  1,000 mcg Oral Daily     enoxaparin ANTICOAGULANT  40 mg Subcutaneous Q24H     ferrous sulfate  325 mg Oral Daily     folic acid  1 mg Oral Daily     [Held by provider] furosemide  40 mg Oral BID     lactulose  20 g Oral TID     levothyroxine  175 mcg Oral Daily     multivitamin, therapeutic  1 tablet Oral Daily     omeprazole  20 mg Oral Daily     phytonadione  0.1 mg Oral Daily     rifaximin  550 mg Oral BID     sitagliptin  100 mg Oral Daily     sodium chloride (PF)  3 mL Intracatheter Q8H     [Held by  provider] spironolactone  200 mg Oral Daily     venlafaxine  75 mg Oral At Bedtime     vitamin A  10,000 Units Oral Daily     vitamin D2  50,000 Units Oral Weekly     Vitamin D3  4,000 Units Oral Daily     vitamin E  400 Units Oral Daily     Current Outpatient Medications   Medication Instructions     atorvastatin (LIPITOR) 20 mg, Oral, DAILY     calcium carbonate (TUMS) 500 MG chewable tablet 1 tablet, Oral, DAILY PRN     calcium citrate-vitamin D (CITRACAL W/D) 250-100 MG-UNIT tablet 2 tablets, Oral, 2 TIMES DAILY WITH MEALS     cyanocobalamin (VITAMIN B-12) 1,000 mcg, Oral, DAILY     Ferrous Sulfate (IRON) 325 (65 FE) MG tablet 1 tablet, Oral, DAILY     folic acid (FOLVITE) 1 mg, Oral, DAILY     furosemide (LASIX) 40 mg, Oral, 2 TIMES DAILY     lactulose (CHRONULAC) 20 g, Oral, 2 TIMES DAILY     levothyroxine (SYNTHROID) 175 mcg, Oral, every day except for Monday and Thursday     metFORMIN (GLUCOPHAGE) 1,000 mg, Oral, 2 TIMES DAILY WITH MEALS     multivitamin (DEKAS ESSENTIAL) capsule 1 capsule, Oral, DAILY     omeprazole (PRILOSEC) 20 mg, Oral, DAILY     ondansetron (ZOFRAN-ODT) 4 mg, Sublingual, EVERY 6 HOURS PRN     rifaximin (XIFAXAN) 550 mg, Oral, 2 TIMES DAILY     spironolactone (ALDACTONE) 200 mg, Oral, DAILY     SUMAtriptan (IMITREX) 50 mg, Oral, AT ONSET OF HEADACHE     traZODone (DESYREL)  mg, Oral, AT BEDTIME PRN     valACYclovir (VALTREX) 1,000 mg, Oral, DAILY PRN     venlafaxine (EFFEXOR) 75 mg, Oral, AT BEDTIME     vitamin A 10,000 Units, Oral, DAILY     vitamin C (ASCORBIC ACID) 100 mg, Oral, DAILY     Vitamin D-1000 Max St 4,000 Units, Oral     vitamin D2 (ERGOCALCIFEROL) 50,000 Units, Oral, WEEKLY     vitamin E (TOCOPHEROL) 400 Units, Oral, DAILY     Vitamin K (Phytonadione) 100 mcg, Oral, DAILY         Allergies  Allergies   Allergen Reactions     Prednisone      Nsaids Other (See Comments)       Family hx Social hx   Cousin had liver disease, transplanted, had fatty liver disease  Sister  - diabetes, also with many family members on father sides with diabetes Social History     Tobacco Use     Smoking status: Never Smoker     Smokeless tobacco: Never Used   Substance Use Topics     Alcohol use: Not Currently     Frequency: Never     Binge frequency: Never     Comment: Last drink was in 2017     Drug use: Never          Review of systems  A 10-point review of systems was negative.      Examination  /77 (BP Location: Left arm)   Pulse 75   Temp 96.9  F (36.1  C) (Oral)   Resp 16   Wt 59.4 kg (130 lb 15.3 oz)   SpO2 98%   BMI 22.48 kg/m      Intake/Output Summary (Last 24 hours) at 11/24/2020 1135  Last data filed at 11/23/2020 2200  Gross per 24 hour   Intake 1080 ml   Output --   Net 1080 ml       Gen- well, NAD, A+Ox3, normal color  Eye- EOMI  ENT- MMM  Lym- no palpable LAD  CVS- RRR  RS- CTA  Abd- soft abdomen, mildly tender diffusely, no appreciable ascites on exam , skin folds consistent with prior weight loss  Extr- no RADHA  Neuro- no asterixis, answering all questions appropriately, slight sleepiness noted during the conversation  Skin- no rash  Psych- normal mood    Laboratory  Lab Results   Component Value Date     11/24/2020    POTASSIUM 3.8 11/24/2020    CHLORIDE 107 11/24/2020    CO2 26 11/24/2020    BUN 11 11/24/2020    CR 0.78 11/24/2020       Lab Results   Component Value Date    BILITOTAL 0.8 11/24/2020    ALT 29 11/24/2020    AST 41 11/24/2020    ALKPHOS 124 11/24/2020       Lab Results   Component Value Date    ALBUMIN 2.6 11/24/2020    PROTTOTAL 6.4 11/24/2020        Lab Results   Component Value Date    WBC 4.1 11/24/2020    HGB 9.5 11/24/2020     11/24/2020     11/24/2020       Lab Results   Component Value Date    INR 1.17 11/22/2020     Lipase is 13  CRP 3.7  Blood cultures negative to date    Urine culture 10k-50k urogenital durga    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 Mild colonic bowel wall thickening with a large colonic stool  burden, likely related to cirrhosis and portal hypertension.  2. Cirrhotic configuration of the liver with findings of portal  hypertension. No focal hepatic masses or substantial intra-abdominal  ascites.  3. Enlarged retroperitoneal and mary hepatis lymph nodes, stable from  6/4/2020.      CT Head  No acute intracranial pathology.     XR Chest  No acute cardiopulmonary disease.      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    No clear etiology or precipitating factor for her encephalopathy has been identified,  possibilities include SBP given her diffuse abdominal pain.  No signs or symptoms to indicate GI bleed.  Blood cultures have been negative.  Chest x-ray without any focal infiltrative process.  CT abdomen pelvis showed large stool burden which may be contributing.  She does have portal hypertensive colopathy.    Problem List  Decompensated CUETO cirrhosis  Hepatic Encephalopathy  History of ascites in the parietal thorax, on diuretics  Acute on chronic macrocytic anemia    Recommendations  1.  Hepatic encephalopathy  Potential precipitant includes constipation or SBP.  - Recommend discussion with CAPS team to see if they can do bedside ultrasound and diagnostic paracentesis  - Recommend aggressive lactulose protocol to titrate to at least 3-4 loose bowel movements every day  - Continue rifaximin 550 mg twice daily  - Avoid any sedating medications    2.  Acute on chronic anemia  No clear evidence of  ongoing bleeding.  Has had a recent EGD and colonoscopy.  Would monitor stool output closely, documenting color and consistency to ensure that there is no melena  - Would obtain full anemia workup (hemolysis labs, B12, folate levels, peripheral smear, iron panel, TSH). Iron panel at OSH has shown low ferritin in the past, would repeat full panel again. Celiac panel negative.   - Hold enoxaparin  - No indication to start IV PPI or octreotide at this time    3.  Decompensated cirrhosis  MELD of 8.   Has had low p.o. intake, okay to hold diuretics for now and let her rehyrate with oral intake to see if that would help with mental status  Consultation with nutrition to optimize her PO intake, this should continue as outpatient as well  Continue to monitor MELD labs daily as needed    4. Dysphagia  5. Malnutrition  Recent EGD without any significant finding to explain her dysphagia. Would recommend obtaining an esophagram to look at the motility, this can be done inpatient or as outpatient  As above, consider nutrition consultation      GI will continue to follow the patient.  Patient was discussed with Dr Leventhal Mandip KC, MD  Gastroenterology Fellow  Division of Gastroenterology, Hepatology and Nutrition  [facility]  Text page Pager [phone]

## 2020-11-24 NOTE — PLAN OF CARE
Time: 1769-4415    Reason for admission: Hepatic Encephalopathy    Pt is A&Ox4, scored 0 on [place]. Up ad adriano, calls appropriately. VSS on RA, soft BPs. C/o lower back ache -- Tylenol given at bedtime. LS clear, denies SOB. Tums given x1 for abd gas discomfort. Denies N/V. Pt requested something to aid with sleep d/t not getting much rest the past few nights -- MD paged at 0230, 1x dose atarax given. Pt slept well for remainder of the night. Voids spontaneously, soft/loose BM x1 this shift. BGs stable overnight 130 and 111. L PIV SL.    Plan: PT/OT following, possible discharge if clinically improved and mental status is at baseline. Pt is hopeful to go home today.

## 2020-11-24 NOTE — PLAN OF CARE
"Patient asked at 5:30pm why she hadn't gotten her lactulose doses today.  No scheduled doses ordered.  MD notified of patients concern.  MD ordered scheduled lactulose.  Patient given 20g lactulose with supper, per her request and will want one more dose this evening to \"catch up\" from day shift.  Despite scheduled doses not being given, patient is alert and oriented, and appropriate.  Patient's complaint is feeling cloudy and slower than normal.   Consider consult for her liver transplant team?  Continue to monitor.    "

## 2020-11-24 NOTE — PROGRESS NOTES
CLINICAL NUTRITION SERVICES - ASSESSMENT NOTE     Nutrition Prescription    RECOMMENDATIONS FOR MDs/PROVIDERS TO ORDER:  Thank you for liberalizing diet 11/24 per RD recommendations.    Malnutrition Status:    Severe malnutrition in the context of chronic illness.    Recommendations already ordered by Registered Dietitian (RD):  Nutrition Education: Provided education on high faye/protein liquids to use w/ Rx pass.   Collaboration with other providers--Textpaged attending requesting diet liberalization.  Medical food supplement therapy--vanilla Boost glucose control between meals 2x/day.    Future/Additional Recommendations:  Follow intake with liberalized diet and supplement acceptance.     Unable to see pt due to COVID 19 status and limited PPE resources reserved for other priority staff. Spoke with Susan over the phone.     REASON FOR ASSESSMENT  Susan Puckett is a/an 50 year old female assessed by the dietitian for Admission Nutrition Risk Screen for unintentional loss of 10# or more in the past two months and reduced oral intake over the last month.    Chart reviewed.  PMH includes: DM2, gastric ulcers, primary hypothyroidism, liver cirrhosis 2/2 CUETO c/b hepatic encephalopathy, depression, and h/o Faustino en Y gastric bypass.      NUTRITION HISTORY  Pt noted that decreased po intake since May.  She c/o nothing tastes good.  She tries to eat 6x/day but she has to take multiple pills per day that fill her up.  She is followed by [facility] outpatient RD and transplant team RD with virtual visits.   She tries to use [place] Good start with Fairlife milk with additional protein powder. However she dislikes the smell of the protein powder.      CURRENT NUTRITION ORDERS  Diet: Liberalized to regular (was 2gm Na through 11/24 pm)  Intake/Tolerance: Susan ate 50% of dinner 11/23 and only 25% of breakfast 11/24.  She notes that she threw up earlier on 11/24.    Susan was disappointed that she wasn't able to order the  "food she wanted due to sodium restriction despite her poor intake.  Diet has since been liberalized.    LABS  Labs reviewed  FSB/24: 111, 141, 132, 165, 117, 215   : 130, 124, 122, 136, 130    GI:  Last BM:  (3 stools/day over last 2 days)    MEDICATIONS  Medications reviewed  Vitamin C 125 mg  Citracal 2x/day  FeSO4 325 mg   Folic acid 1 mg  Lactulose 3x/d (titrate to 3-4 loose stools/day)  Levothyroxine  theravit  prilosec  Vitamin A 10,000 units/day  Vit D2 50,000 q week  Vit E 400 units q day    ANTHROPOMETRICS  Height: 5'4\"  Most Recent Weight: 59.6 kg (131 lb 6.4 oz)    IBW:54.5 kg (109% IBW)  BMI: Normal BMI  Weight History: Wt down 7.1kg (10.6%) within the past 3 months and 18 kg (23%) within the past year.  Wt Readings from Last 20 Encounters:   20 59.6 kg (131 lb 6.4 oz)   10/25/20 60.5 kg (133 lb 6.4 oz)   10/20/20 57.5 kg (126 lb 11.2 oz)   20 60.8 kg (134 lb)   09/10/20 65 kg (143 lb 6.4 oz) [facility]  20 66.7 kg (147 lb)  20 80.6 kg (177 lb 12.8 oz)  19 77.7 kg (171 lb 5 oz)    Dosing Weight: 59.4 kg (lowest weight this admission)    ASSESSED NUTRITION NEEDS  Estimated Energy Needs: 4250-5782+ kcals/day (25 - 30+ kcals/kg)  Justification: Maintenance  Estimated Protein Needs: 59-71+ grams protein/day (1 - 1.2 grams of pro/kg)  Justification: maintenance plus more as tolerated w/o encephalopathy  Estimated Fluid Needs: 1584-2693 mL/day (1 mL/kcal)   Justification: Maintenance and Per provider pending fluid status    PHYSICAL FINDINGS  See malnutrition section below.      MALNUTRITION  % Intake: </=50% for >/= 1 month (severe)  % Weight Loss: > 20% in 1 year (severe)  Subcutaneous Fat Loss: Unable to assess  Muscle Loss: Unable to assess  Fluid Accumulation/Edema: None noted  Malnutrition Diagnosis: Severe malnutrition in the context of chronic illness.    NUTRITION DIAGNOSIS  Inadequate protein-energy intake related to early satiety, lack of appetite as " evidenced by significant wt loss.       INTERVENTIONS  Implementation  Please see Recommendations already ordered above.    Goals  Total avg nutritional intake to meet a minimum of 25 kcal/kg and 1.0 g PRO/kg daily (per dosing wt 59.4 kg).     Monitoring/Evaluation  Progress toward goals will be monitored and evaluated per protocol.

## 2020-11-24 NOTE — CONSULTS
Care Management Initial Consult    General Information  Assessment completed with: VM-chart review,    Type of CM/SW Visit: Initial Assessment  Primary Care Provider verified and updated as needed: Yes; follows actively with PCP, Endocrinology, Gastroenterology, and Transplant  Readmission within the last 30 days: no previous admission in last 30 days   Reason for Consult: other (see comments)(elevated readmission risk score)  Advance Care Planning:    None on file       Communication Assessment  Patient's communication style:  spoken language (English or Bilingual)    Hearing Difficulty or Deaf: no   Wear Glasses or Blind: yes    Living Environment:   People in home: spouse;child(suresh), dependent(dog)     Current living Arrangements: house(one story home w/basement (stays on main level))      Able to return to prior arrangements: yes       Family/Social Support:  Care provided by: self;spouse/significant other  Provides care for: other (see comments)(dog)  Marital Status:             Description of Support System: Supportive;Involved    Support Assessment: Adequate family and caregiver support      Current Resources:   Skilled Home Care Services:  none recommended  Community Resources:  none prior to admission  Equipment currently used at home: none  Supplies currently used at home:  NA      Lifestyle & Psychosocial Needs (auto populated information):  Lifestyle     Physical activity     Days per week: 1 day     Minutes per session: 10 min     Stress: To some extent     Social Needs     Financial resource strain: Not on file     Food insecurity     Worry: Never true     Inability: Never true     Transportation needs     Medical: No     Non-medical: No     Socioeconomic History     Marital status:      Spouse name: Not on file     Number of children: Not on file     Years of education: Not on file     Highest education level: Bachelor's degree (e.g., BA, AB, BS)   Relationships     Social  connections     Talks on phone: More than three times a week     Gets together: Once a week     Attends Latter day service: More than 4 times per year     Active member of club or organization: No     Attends meetings of clubs or organizations: Never     Relationship status:      Intimate partner violence     Fear of current or ex partner: Not on file     Emotionally abused: Not on file     Physically abused: Not on file     Forced sexual activity: Not on file     Tobacco Use     Smoking status: Never Smoker     Smokeless tobacco: Never Used   Substance and Sexual Activity     Alcohol use: Not Currently     Frequency: Never     Binge frequency: Never     Comment: Last drink was in 2017     Drug use: Never       Functional Status:  Prior to admission patient needed assistance: some  Dependent ADLs:: (meal prep;housekeeping;laundry;medication management)  Dependent IADLs:: ( manages finances & drives, assists PRN w/med mgmt)        Additional Information:    Reviewed PT and OT recommendations, spoke with patient by phone and discussed rec's for outpatient therapies. Patient would prefer home care therapy and requested a nurse as well to help her with medication management. Patient was provided with a printed list (siphoned by her zip code) from Medicare.gov to review for Home Care choices.  Discussed associated Medicare star ratings to assist with choice for referrals/discharge planning Yes. Education was given to patient that star ratings are updated/maintained by Medicare and can be reviewed by visiting www.medicare.gov Yes. Patient plans to review the list and call the RNCC back with her choices.    Addendum @ 3577: Spoke with patient by phone to obtain her Home Care choices after reviewing printed list from Medicare.gov. Patient stated she would like a referral sent to #1 [facility] and #2 [facility]. Email referral sent to MARCUS and Millie Khanna;waiting on confirmation of  acceptance.      Yuki Wong RN, BSN, PHN  Care Coordinator  [facility]  Direct phone: [phone]  Pager: [phone]    To contact the on-call Weekend Care Coordination Team please page [phone]

## 2020-11-24 NOTE — PROGRESS NOTES
[facility]     Medicine Progress Note - Hospitalist Service, Gold 8       Date of Admission:  11/22/2020  Assessment & Plan       Susan Puckett is a 50 year old female admitted on 11/22/2020. She has a past medical history significant for DM2, gastric ulcers, primary hypothyroidism, liver cirrhosis 2/2 CUETO c/b hepatic encephalopathy, depression, and h/o Faustino en Y gastric bypass who presented with fatigue, confusion and generalized weakness. She was admitted to medicine for acute hepatic encephalopathy, which is improving, and hospital course complicated by acute on chronic anemia.      Acute hepatic encephalopathy  Presenting with confusion and generalize weakness. Ammonia 122. Lactate 2.4, INR 1.17. PTA on lactulose 10 mg TID.   CT head and CT A/P without acute pathology except for large stool burden  Low suspicion for bacterial infection precipitating worsening encephalopathy as no leukocytosis and CRP wnl  Etiology believed to be 2/2 constipation vs. Acute on chronic anemia (see below). Infection less likely given no leukocytosis and CRP wnl.   - Holding PTA lasix and spironolactone given soft pressures   - pta rifaximin 550 mg BID  - Cont lactulose TID, titrate to 3-4 bowel movements daily     - GI hepatology consulted, appreciate assistance     Acute on Chronic Anemia  Baseline Hgb 11-12, drop to 9.3-9.5 over last few days, No clear sign of bleeding (no vomiting, no melena or hematochezia reported) and stable BP but she is reporting upset stomach and nausea which is new . Noted history of gastric ulcers.   -Hold enoxaparin  -Anemia work-up ordered for AM - hemolysis labs, iron panel, peripheral smear   -Given no signs of active bleeding, will not initiate treatment for variceal bleed at this time  -GI/hepatology consult as above   -Given history of gastric ulcer, will replace home omeprazole with IV pantoprazole BID   -Monitor daily Hgb unless change in clinical  status/vital signs     Liver cirrhosis due to CUETO  - Awaiting liver transplant, follows with Dr. Cook.     Hypothyroidism  TSH low 0.08 on admission, T4 free wnl 1.28. Had recent virtual visit with Endo who reduced her levothyroxine to 175 mcg dialy.  - continue pta levothyroxine 175 mcg     DM2  Recent virtual visit with Endocrine; discontinued metformin due to liver disease. Started on Januvia. Glucose 379 on admission.  - continued pta januvia 100 mg daily  - correction factor insulin      GERD  H/o gastric ulcers  - Omeprazole replaced with pantoprazole IV as above      Hyperlipidemia  - pta atorvastatin 20mg daily     Chronic macrocytic anemia  S/p Faustino en Y gastric bypass   Hgb 11.1, . Stable, at baseline. Anemia likely multifactorial in setting of chronic liver disease and previous gastric bypass surgery.  - pta vitamin B12 1000 mcg daily, iipt517 mg daily, folate 1 mg daily  - pta multivitamin, Vit A, C, D2, E, K      Chronic thrombocytopenia  Ptls 125 on admission. Stable, at baseline. Likely secondary to liver disease.  - monitor     Anxiety  Depression  - pta venlafaxine 5 mg at bedtime      Insomnia  - holding pta trazodone in setting of encephalopathy at admission        Diet: 2 Gram Sodium Diet    DVT Prophylaxis: VTE Prophylaxis contraindicated due to concern for bleeding  Rossi Catheter: not present  Code Status: Full Code           Disposition Plan   Expected discharge: 2 - 3 days, recommended to prior living arrangement once hemoglobin stable.  Entered: Elham Cleary DO 11/24/2020, 4:25 PM       The patient's care was discussed with the Bedside Nurse and Patient.    Elham Cleary DO  Hospitalist Service, [address]   Contact information available via [facility] Paging/Directory  Please see sign in/sign out for up to date coverage information  ______________________________________________________________________    Interval History    Overnight received hydroxyzine to help sleep. Lactulose schedule, had 1 soft, brown BM overnight. This morning, she is reporting nausea, feels similar to when she first started feeling sick on Saturday before presentation. She was not able to eat her breakfast this morning.   Report improvement in confusion. Denies melena, hematochezia, vomiting. Endorses some epigastric abdominal pain.     Data reviewed today: I reviewed all medications, new labs and imaging results over the last 24 hours. I personally reviewed no images or EKG's today.    Physical Exam   Vital Signs: Temp: 95.9  F (35.5  C) Temp src: Oral BP: 107/76 Pulse: 73   Resp: 18 SpO2: 98 % O2 Device: None (Room air)    Weight: 131 lbs 6.4 oz  Constitutional: no apparent distress, pleasant and cooperative   Cardiovascular: regular rate and rhythm, normal S1 and S2, no murmurs, rubs, or gallops noted noted, no bilateral lower extremity edema   Respiratory: clear to auscultation bilaterally, no wheezing, rales, or rhonchi, normal work of breathing   GI: abdomen soft, tender to palpation suprapubic and epigastric areas, non distended, bowel sounds present, no ascites   Neuro: alert and oriented x4, no gross focal deficits noted, no asterixis today        Data   Recent Labs   Lab 11/24/20  0645 11/23/20  0657 11/22/20  1406   WBC 4.1 4.4 5.0   HGB 9.5* 9.3* 11.1*   * 108* 109*   * 104* 125*   INR  --   --  1.17*    139 138   POTASSIUM 3.8 3.5 4.0   CHLORIDE 107 106 106   CO2 26 28 28   BUN 11 12 19   CR 0.78 0.84 0.95   ANIONGAP 6 5 4   MAURI 9.0 8.5 8.7   * 130* 379*   ALBUMIN 2.6* 2.7* 2.7*   PROTTOTAL 6.4* 6.6* 7.7   BILITOTAL 0.8 1.1 0.8   ALKPHOS 124 122 137   ALT 29 27 32   AST 41 36 42   LIPASE  --   --  13*   TROPI  --   --  <0.015

## 2020-11-25 ENCOUNTER — PATIENT OUTREACH (OUTPATIENT)
Dept: CARE COORDINATION | Facility: CLINIC | Age: 50
End: 2020-11-25

## 2020-11-25 VITALS
TEMPERATURE: 96 F | RESPIRATION RATE: 16 BRPM | SYSTOLIC BLOOD PRESSURE: 114 MMHG | HEART RATE: 69 BPM | WEIGHT: 131.4 LBS | BODY MASS INDEX: 22.55 KG/M2 | OXYGEN SATURATION: 100 % | DIASTOLIC BLOOD PRESSURE: 74 MMHG

## 2020-11-25 LAB
ALBUMIN SERPL-MCNC: 2.6 G/DL (ref 3.4–5)
ALP SERPL-CCNC: 135 U/L (ref 40–150)
ALT SERPL W P-5'-P-CCNC: 30 U/L (ref 0–50)
ANION GAP SERPL CALCULATED.3IONS-SCNC: 7 MMOL/L (ref 3–14)
AST SERPL W P-5'-P-CCNC: 45 U/L (ref 0–45)
BASOPHILS # BLD AUTO: 0 10E9/L (ref 0–0.2)
BASOPHILS NFR BLD AUTO: 0.5 %
BILIRUB SERPL-MCNC: 1.4 MG/DL (ref 0.2–1.3)
BUN SERPL-MCNC: 10 MG/DL (ref 7–30)
CALCIUM SERPL-MCNC: 8.9 MG/DL (ref 8.5–10.1)
CHLORIDE SERPL-SCNC: 107 MMOL/L (ref 94–109)
CO2 SERPL-SCNC: 26 MMOL/L (ref 20–32)
COPATH REPORT: NORMAL
CREAT SERPL-MCNC: 0.71 MG/DL (ref 0.52–1.04)
DIFFERENTIAL METHOD BLD: NORMAL
EOSINOPHIL # BLD AUTO: 0.1 10E9/L (ref 0–0.7)
EOSINOPHIL NFR BLD AUTO: 2.1 %
ERYTHROCYTE [DISTWIDTH] IN BLOOD BY AUTOMATED COUNT: 14.1 % (ref 10–15)
FOLATE SERPL-MCNC: 30.5 NG/ML
GFR SERPL CREATININE-BSD FRML MDRD: >90 ML/MIN/{1.73_M2}
GLUCOSE BLDC GLUCOMTR-MCNC: 124 MG/DL (ref 70–99)
GLUCOSE BLDC GLUCOMTR-MCNC: 208 MG/DL (ref 70–99)
GLUCOSE SERPL-MCNC: 130 MG/DL (ref 70–99)
HAPTOGLOB SERPL-MCNC: 63 MG/DL (ref 32–197)
HCT VFR BLD AUTO: 28.2 % (ref 35–47)
HGB BLD-MCNC: 9.5 G/DL (ref 11.7–15.7)
IMM GRANULOCYTES # BLD: 0 10E9/L (ref 0–0.4)
IMM GRANULOCYTES NFR BLD: 0.2 %
INR PPP: 1.23 (ref 0.86–1.14)
IRON SATN MFR SERPL: 58 % (ref 15–46)
IRON SERPL-MCNC: 94 UG/DL (ref 35–180)
LDH SERPL L TO P-CCNC: 168 U/L (ref 81–234)
LYMPHOCYTES # BLD AUTO: 1.5 10E9/L (ref 0.8–5.3)
LYMPHOCYTES NFR BLD AUTO: 34.4 %
MAGNESIUM SERPL-MCNC: 1.6 MG/DL (ref 1.6–2.3)
MCH RBC QN AUTO: 35.8 PG (ref 26.5–33)
MCHC RBC AUTO-ENTMCNC: 33.7 G/DL (ref 31.5–36.5)
MCV RBC AUTO: 106 FL (ref 78–100)
MONOCYTES # BLD AUTO: 0.4 10E9/L (ref 0–1.3)
MONOCYTES NFR BLD AUTO: 8.4 %
NEUTROPHILS # BLD AUTO: 2.3 10E9/L (ref 1.6–8.3)
NEUTROPHILS NFR BLD AUTO: 54.4 %
NRBC # BLD AUTO: 0 10*3/UL
NRBC BLD AUTO-RTO: 0 /100
PLATELET # BLD AUTO: 100 10E9/L (ref 150–450)
POTASSIUM SERPL-SCNC: 3.5 MMOL/L (ref 3.4–5.3)
PROT SERPL-MCNC: 6.4 G/DL (ref 6.8–8.8)
RBC # BLD AUTO: 2.65 10E12/L (ref 3.8–5.2)
RETICS # AUTO: 78.2 10E9/L (ref 25–95)
RETICS/RBC NFR AUTO: 3 % (ref 0.5–2)
SODIUM SERPL-SCNC: 139 MMOL/L (ref 133–144)
TIBC SERPL-MCNC: 162 UG/DL (ref 240–430)
TRANSFERRIN SERPL-MCNC: 137 MG/DL (ref 210–360)
VIT B12 SERPL-MCNC: 2139 PG/ML (ref 193–986)
WBC # BLD AUTO: 4.3 10E9/L (ref 4–11)

## 2020-11-25 PROCEDURE — 250N000013 HC RX MED GY IP 250 OP 250 PS 637: Performed by: PHYSICIAN ASSISTANT

## 2020-11-25 PROCEDURE — 83615 LACTATE (LD) (LDH) ENZYME: CPT | Performed by: INTERNAL MEDICINE

## 2020-11-25 PROCEDURE — 99238 HOSP IP/OBS DSCHRG MGMT 30/<: CPT | Performed by: INTERNAL MEDICINE

## 2020-11-25 PROCEDURE — C9113 INJ PANTOPRAZOLE SODIUM, VIA: HCPCS | Performed by: INTERNAL MEDICINE

## 2020-11-25 PROCEDURE — 85060 BLOOD SMEAR INTERPRETATION: CPT | Performed by: INTERNAL MEDICINE

## 2020-11-25 PROCEDURE — 83540 ASSAY OF IRON: CPT | Performed by: INTERNAL MEDICINE

## 2020-11-25 PROCEDURE — 85027 COMPLETE CBC AUTOMATED: CPT | Performed by: INTERNAL MEDICINE

## 2020-11-25 PROCEDURE — 99232 SBSQ HOSP IP/OBS MODERATE 35: CPT | Mod: GC | Performed by: INTERNAL MEDICINE

## 2020-11-25 PROCEDURE — 83735 ASSAY OF MAGNESIUM: CPT | Performed by: INTERNAL MEDICINE

## 2020-11-25 PROCEDURE — 82746 ASSAY OF FOLIC ACID SERUM: CPT | Performed by: INTERNAL MEDICINE

## 2020-11-25 PROCEDURE — 80053 COMPREHEN METABOLIC PANEL: CPT | Performed by: INTERNAL MEDICINE

## 2020-11-25 PROCEDURE — 85004 AUTOMATED DIFF WBC COUNT: CPT | Performed by: INTERNAL MEDICINE

## 2020-11-25 PROCEDURE — 83010 ASSAY OF HAPTOGLOBIN QUANT: CPT | Performed by: INTERNAL MEDICINE

## 2020-11-25 PROCEDURE — 84466 ASSAY OF TRANSFERRIN: CPT | Performed by: INTERNAL MEDICINE

## 2020-11-25 PROCEDURE — 999N000127 HC STATISTIC PERIPHERAL IV START W US GUIDANCE

## 2020-11-25 PROCEDURE — 250N000013 HC RX MED GY IP 250 OP 250 PS 637: Performed by: INTERNAL MEDICINE

## 2020-11-25 PROCEDURE — 36415 COLL VENOUS BLD VENIPUNCTURE: CPT | Performed by: INTERNAL MEDICINE

## 2020-11-25 PROCEDURE — 999N001086 HC STATISTIC MORPHOLOGY W/INTERP HEMEPATH TC 85060: Performed by: INTERNAL MEDICINE

## 2020-11-25 PROCEDURE — 85610 PROTHROMBIN TIME: CPT | Performed by: INTERNAL MEDICINE

## 2020-11-25 PROCEDURE — 250N000011 HC RX IP 250 OP 636: Performed by: INTERNAL MEDICINE

## 2020-11-25 PROCEDURE — 82607 VITAMIN B-12: CPT | Performed by: INTERNAL MEDICINE

## 2020-11-25 PROCEDURE — 85045 AUTOMATED RETICULOCYTE COUNT: CPT | Performed by: INTERNAL MEDICINE

## 2020-11-25 PROCEDURE — 999N001017 HC STATISTIC GLUCOSE BY METER IP

## 2020-11-25 PROCEDURE — 83550 IRON BINDING TEST: CPT | Performed by: INTERNAL MEDICINE

## 2020-11-25 RX ORDER — HYDROXYZINE HYDROCHLORIDE 25 MG/1
25 TABLET, FILM COATED ORAL
Status: COMPLETED | OUTPATIENT
Start: 2020-11-25 | End: 2020-11-25

## 2020-11-25 RX ADMIN — SITAGLIPTIN 100 MG: 100 TABLET, FILM COATED ORAL at 07:48

## 2020-11-25 RX ADMIN — Medication 10000 UNITS: at 07:48

## 2020-11-25 RX ADMIN — PANTOPRAZOLE SODIUM 40 MG: 40 INJECTION, POWDER, FOR SOLUTION INTRAVENOUS at 08:00

## 2020-11-25 RX ADMIN — Medication 2 TABLET: at 07:48

## 2020-11-25 RX ADMIN — Medication 4000 UNITS: at 07:48

## 2020-11-25 RX ADMIN — LACTULOSE 20 G: 20 SOLUTION ORAL at 07:48

## 2020-11-25 RX ADMIN — HYDROXYZINE HYDROCHLORIDE 25 MG: 25 TABLET, FILM COATED ORAL at 03:38

## 2020-11-25 RX ADMIN — CYANOCOBALAMIN TAB 1000 MCG 1000 MCG: 1000 TAB at 07:48

## 2020-11-25 RX ADMIN — ATORVASTATIN CALCIUM 20 MG: 10 TABLET, FILM COATED ORAL at 07:48

## 2020-11-25 RX ADMIN — THERA TABS 1 TABLET: TAB at 07:48

## 2020-11-25 RX ADMIN — LACTULOSE 20 G: 20 SOLUTION ORAL at 13:42

## 2020-11-25 RX ADMIN — Medication 125 MG: at 07:59

## 2020-11-25 RX ADMIN — LEVOTHYROXINE SODIUM 175 MCG: 0.1 TABLET ORAL at 07:48

## 2020-11-25 RX ADMIN — Medication 0.1 MG: at 07:48

## 2020-11-25 RX ADMIN — VITAMIN E CAP 400 UNIT 400 UNITS: 400 CAP at 07:48

## 2020-11-25 RX ADMIN — RIFAXIMIN 550 MG: 550 TABLET ORAL at 07:48

## 2020-11-25 RX ADMIN — FOLIC ACID 1 MG: 1 TABLET ORAL at 07:48

## 2020-11-25 RX ADMIN — VENLAFAXINE 75 MG: 75 TABLET ORAL at 00:10

## 2020-11-25 RX ADMIN — ACETAMINOPHEN 650 MG: 325 TABLET, FILM COATED ORAL at 00:10

## 2020-11-25 RX ADMIN — ACETAMINOPHEN 650 MG: 325 TABLET, FILM COATED ORAL at 07:48

## 2020-11-25 RX ADMIN — FERROUS SULFATE TAB 325 MG (65 MG ELEMENTAL FE) 325 MG: 325 (65 FE) TAB at 07:48

## 2020-11-25 ASSESSMENT — ACTIVITIES OF DAILY LIVING (ADL)
ADLS_ACUITY_SCORE: 17

## 2020-11-25 NOTE — PLAN OF CARE
5A OT: Cancel     Pt declined OT session this PM in anticipation for discharge. Pt verbalized understanding of UE HEP and endorsed already scheduling Home Health OT/PT. Pt denied any further questions/concerns.         Occupational Therapy Discharge Summary    Reason for therapy discharge:    Discharged to home with home therapy.    Progress towards therapy goal(s). See goals on Care Plan in [facility] electronic health record for goal details.  Goals partially met.  Barriers to achieving goals:   discharge from facility.    Therapy recommendation(s):    Continued therapy is recommended.  Rationale/Recommendations:  to maximize IND in ADLs/IADLs .

## 2020-11-25 NOTE — PLAN OF CARE
Time 9478-9858     Reason for admission: hepatic encephalopathy    Vitals: VSS on RA    /75   Pulse 74   Temp 96.4  F (35.8  C) (Oral)   Resp 16   Wt 59.6 kg (131 lb 6.4 oz)   SpO2 98%   BMI 22.55 kg/m      Activity: WDL  Pain: Denies  Neuro: A&Ox4  Cardiac: WDL  Respiratory: WDL on RA  GI/: voiding spontaneously. 2 BMs on shift. Nausea/emesis x1  Diet: 2 gm Na. Fair appetite  Lines: PIV  Skin: WDL  Labs: hgb 9.5 (down from 11.1 on 11/22/20).   New this shift: Pt reported feeling more confused and groggy this morning. Spiritual health had long discussion re: advanced directive this morning. Worked with OT on fine motor skills and med management. GI consult placed. Nutrition consult pending. Holding lovenox for possible bleed. Diet liberalized to encourage PO intake.     Plan: Anemia work-up labs ordered for tomorrow am.       Continue to monitor and follow POC

## 2020-11-25 NOTE — DISCHARGE INSTRUCTIONS
Susan,    As we discussed on the phone this morning here is a list (with phone numbers) for the durable medical equipment (DME) companies within 10 miles of your [facility] zip code. Also, I have made a referral to the Clinic Nurse Care Coordination staff to assist you in with needs going forward. They are a wonderful resource of information and assistance. Thank you for allowing me to participate in your care during this hospital admission.     Continue to feel better!!  Yuki Wong RN, BSN, PHN  Inpatient Nurse Care Coordinator  [facility]   [address]  [place] [address]  [phone]    [employer]  [address]  [place], [place] [address] [phone]    [employer] [facility]  [address] [place] [address]  [phone]    [facility]  [address] [place] [address] [phone]    [employer] [facility]  [address] [place] [address] [phone]    [facility]  [address] [place] [address]  [phone]    [employer]   [address] [place] [address] [phone]    [employer]  [address]  [place] [address] [phone]  
no

## 2020-11-25 NOTE — PLAN OF CARE
Time: 3912-9028     Reason for admission: Hepatic Encephalopathy     Pt is A&Ox4, scored 0 on [place]. Up ad adriano, calls appropriately. VSS on RA, soft BPs. C/o lower back ache -- Tylenol given at bedtime. LS clear, denies SOB. Denies N/V. Voids spontaneously. R PIV removed d/t pain and tenderness at site. Pt requesting to wait until necessary to place new access. C/o insomnia -- MD metzger, 1x dose atarax given at 0330, pt slept well remainder of the night. No s/s of active bleed this shift, pt reported soft/loose brown BM x1      Plan: Anemia work-up this AM. RNCC waiting on acceptance of home care referrals. PT/OT and GI following, possible discharge if hgb and mentation remain stable/improve

## 2020-11-25 NOTE — PROGRESS NOTES
[facility] Care   Spoke with patient to discuss plans for home care. Patient to be discharged home 11/25/20  and has agreed to have [facility] follow with services of RN and PT. Patient care support center processing referral.  Patient verbalized understanding that initial visit is scheduled for 11/26 or 11/27/20.  Provided 24 hour phone number for [facility] for any questions or concerns.    Clemencia Whalen RN BSN  [facility] Home Care Liaison  [phone]

## 2020-11-25 NOTE — PROGRESS NOTES
[facility]    Hepatology Follow-up    CC: Confusion    Dx: Decompensated Liver Cirrhosis   Hx of RNYGB    Hepatic Encephalopathy   Acute on chronic anemia   Dysphagia   Malnutrition and weight loss    24 hour events:   No acute events  Infectious workup so far negative    Subjective:  Patient denies fevers, sweats or chills.  Feels much better today, awake and alert  Getting ready to go home      Medications  Current Facility-Administered Medications   Medication Dose Route Frequency     ascorbic acid  125 mg Oral Daily     atorvastatin  20 mg Oral Daily     calcium citrate-vitamin D  2 tablet Oral BID w/meals     cyanocobalamin  1,000 mcg Oral Daily     [Held by provider] enoxaparin ANTICOAGULANT  40 mg Subcutaneous Q24H     ferrous sulfate  325 mg Oral Daily     folic acid  1 mg Oral Daily     [Held by provider] furosemide  40 mg Oral BID     lactulose  20 g Oral TID     levothyroxine  175 mcg Oral Daily     multivitamin, therapeutic  1 tablet Oral Daily     [Held by provider] omeprazole  20 mg Oral Daily     pantoprazole (PROTONIX) IV  40 mg Intravenous BID     phytonadione  0.1 mg Oral Daily     rifaximin  550 mg Oral BID     sitagliptin  100 mg Oral Daily     sodium chloride (PF)  3 mL Intracatheter Q8H     [Held by provider] spironolactone  200 mg Oral Daily     venlafaxine  75 mg Oral At Bedtime     vitamin A  10,000 Units Oral Daily     vitamin D2  50,000 Units Oral Weekly     Vitamin D3  4,000 Units Oral Daily     vitamin E  400 Units Oral Daily       Review of systems  A 10-point review of systems was negative.    Examination  /75 (BP Location: Left arm)   Pulse 68   Temp 95.7  F (35.4  C) (Oral)   Resp 18   Wt 59.6 kg (131 lb 6.4 oz)   SpO2 97%   BMI 22.55 kg/m      Intake/Output Summary (Last 24 hours) at 11/25/2020 0747  Last data filed at 11/24/2020 6237  Gross per 24 hour   Intake 240 ml   Output --   Net 240 ml       Gen- well, NAD, A+Ox3, normal color  CVS-  RRR  RS- CTA  Abd- soft non tender  Extr- no edema  Neuro- alert, awake, no asterixis  Skin- no rash  Psych- normal mood  Lym- no LAD    Laboratory  Lab Results   Component Value Date     11/25/2020    POTASSIUM 3.5 11/25/2020    CHLORIDE 107 11/25/2020    CO2 26 11/25/2020    BUN 10 11/25/2020    CR 0.71 11/25/2020       Lab Results   Component Value Date    BILITOTAL 1.4 11/25/2020    ALT 30 11/25/2020    AST 45 11/25/2020    ALKPHOS 135 11/25/2020       Lab Results   Component Value Date    WBC 4.3 11/25/2020    HGB 9.5 11/25/2020     11/25/2020     11/25/2020       Lab Results   Component Value Date    INR 1.23 11/25/2020     Retic counts 3%, hematocrit of 28.2,   Calculated index of 1.34  (suggestive of hypoproliferation)    B12 pending  Iron panel pending  Smear pending    LDH normal    MELD-Na score: 10 at 11/25/2020  6:39 AM  MELD score: 10 at 11/25/2020  6:39 AM  Calculated from:  Serum Creatinine: 0.71 mg/dL (Rounded to 1 mg/dL) at 11/25/2020  6:39 AM  Serum Sodium: 139 mmol/L (Rounded to 137 mmol/L) at 11/25/2020  6:39 AM  Total Bilirubin: 1.4 mg/dL at 11/25/2020  6:39 AM  INR(ratio): 1.23 at 11/25/2020  6:39 AM  Age: 50 years 4 months      Radiology  Reviewed CT    Assessment  50 year old female with hx of CUETO Cirrhosis with HE, hx of Faustino-en-Y, T2DM, hx of gastric ulcers, hypothyroidism, was admitted with recurrent hepatic encephalopathy.     No clear etiology or precipitating factor for her encephalopathy has been identified,  possibilities include SBP given her diffuse abdominal pain.  No signs or symptoms to indicate GI bleed.  Blood cultures have been negative.  Chest x-ray without any focal infiltrative process.  CT abdomen pelvis showed large stool burden which may be contributing.  She does have portal hypertensive colopathy.    Recommendations  - Ongoing anemia workup, can be continued as outpatient  - no indication for endoscopic evaluation at this time  - Esophagram for  further evaluation of dysphagia, can be done as outpatient, has a luminal team at [facility]  - Nutrition consultation  - Daily MELD labs    Follow up plan: Has appointment with Shahzad Hatch PA-C in hepatology clinic on 12/9    GI team will sign off at this time. Please call or page with questions.    Patient discussed with Dr Leventhal Mandip KC, MD  Gastroenterology Fellow  Division of Gastroenterology, Hepatology and Nutrition  [facility]  Text page Pager [phone]

## 2020-11-25 NOTE — PROGRESS NOTES
Care Management Discharge Note      Anticipated Discharge Date: today 11/25/20  (if patient is feeling well)    Discharge Disposition:  home w/spouse    Discharge Services:  [facility] (RN & PT) - if patient dc today home care will see her for start of care on Friday or Saturday    Discharge DME:  NA    Discharge Transportation: family or friend will provide    Education Provided on the Discharge Plan:  yes    Persons Notified of Discharge Plans: patient    Patient in Agreement with the Plan:  yes    Handoff Referral Completed: Yes; new home care, complex medical, multiple provider specialties following from more than one system:    [facility] SOT Transplant Team  Pending sale to [facility] Vera Sherwood, TRE    Pending sale to [facility] Endocrinology Rivera Stovall MD  [facility] - Gastroenterology  Carol Rueda APRN, CNP    Pending sale to [facility] Optometry Jason Martinez OD  [facility]      Yuki Wong RN, BSN, PHN  Care Coordinator  [facility]  Direct phone: [phone]  Pager: [phone]    To contact the on-call Weekend Care Coordination Team please page [phone]

## 2020-11-25 NOTE — PLAN OF CARE
Physical Therapy Discharge Summary    Reason for therapy discharge:    Discharged to home with home therapy.    Progress towards therapy goal(s). See goals on Care Plan in [facility] electronic health record for goal details.  Goals partially met.  Barriers to achieving goals:   discharge from facility.    Therapy recommendation(s):    Continued therapy is recommended.  Rationale/Recommendations:  to progress functional strength and activity tolerance.

## 2020-11-25 NOTE — PLAN OF CARE
Pt adequate for discharge. A&Ox4. VSS on RA. PIV removed. Belongings with pt.  to transport home with home home care services. Picking meds up at outside pharmacy. Discharge reviewed with pt, questions answered.     Time 5428-7115     Reason for admission: confusion. HE    Vitals: VSS on RA    /74 (BP Location: Left arm)   Pulse 69   Temp 96  F (35.6  C) (Oral)   Resp 16   Wt 59.6 kg (131 lb 6.4 oz)   SpO2 100%   BMI 22.55 kg/m      Activity: Ind  Pain: C/o HA this am, tylenol given.   Neuro: A&Ox4. WH 0  Cardiac: WDL  Respiratory: WDL on RA  GI/: voiding spontaneously. BM on shift. Denies nausea.  Diet: Reg

## 2020-11-26 NOTE — DISCHARGE SUMMARY
[facility]   Hospitalist Discharge Summary      Date of Admission:  11/22/2020  Date of Discharge:  11/25/2020  2:45 PM  Discharging Provider: Elham Cleary DO  Discharge Team: Hospitalist Service, Gold 8    Discharge Diagnoses   Acute Hepatic Encephalopathy   Acute on Chronic Anemia  CUETO Cirrhosis   Hypothyroidism  DM2   GERD  HLD   S/p Faustino En Y gastric bypass   Chronic thrombocytopenia   Anxiety  Depression  Insomnia     Follow-ups Needed After Discharge   Follow-up Appointments     Adult [facility] Follow-up and recommended labs and tests      Follow up with primary care provider, Harleen Billy, within 7 days for   hospital follow- up.  The following labs/tests are recommended: CBC   (hemoglobin count).      Appointments on [place] and/or [facility] (with [facility] or [facility]   provider or service). Call [phone] if you haven't heard regarding   these appointments within 7 days of discharge.          PCP or outpatient GI/hepatology to discuss esophagram further to evaluate reported dysphagia.     Unresulted Labs Ordered in the Past 30 Days of this Admission     Date and Time Order Name Status Description    11/22/2020 1933 Blood culture Preliminary     11/22/2020 1933 Blood culture Preliminary       These results will be followed up by PCP    Discharge Disposition   Discharged to home  Condition at discharge: Stable    Hospital Course   Susan Puckett is a 50 year old female admitted on 11/22/2020. She has a past medical history significant for DM2, gastric ulcers, primary hypothyroidism, liver cirrhosis 2/2 CUETO c/b hepatic encephalopathy, depression, and h/o Faustino en Y gastric bypass who presented with fatigue, confusion and generalized weakness. She was admitted to medicine for acute hepatic encephalopathy, which was most likely due to constipation (large stool burden on abdominal CT at admission) and improved with lactulose.      Acute hepatic encephalopathy    Decompensated CUETO Cirrhosis, history of ascites and hydrothorax   Presenting with confusion and generalize weakness. Ammonia 122.   CT head and CT A/P without acute pathology except for large stool burden   Low suspicion for bacterial infection precipitating worsening encephalopathy as no leukocytosis and CRP wnl. Diagnostic paracentesis was considered, but she had no ascites on exam or imaging.   Etiology believed to be 2/2 constipation as improved with lactulose administration.   - Continue rifaximin 550 mg BID  - Cont lactulose TID, titrate to 3-4 bowel movements daily     - GI hepatology consulted during admission, appreciate assistance   - Furosemide and spironolactone held during admission, resumed at discharge   - Awaiting liver transplant, follows with Dr. Cook.     Acute on Chronic Anemia  Chronic macrocytic anemia  S/p Faustino en Y gastric bypass   Anemia likely multifactorial in setting of chronic liver disease and previous gastric bypass surgery.  Baseline Hgb 11-12, drop to 9.3-9.5 during admission but stabilized at 9.5 in days prior to discharge. No clear sign of bleeding (no vomiting, no melena or hematochezia reported) and stable BP. She did have some nausea and abdominal pain, but this improved with treatment of constipation.   History of known macrocytic anemia.  Given no signs of active bleeding, work-up and treatment for variceal bleed was not initiated.   Following anemia work-up completed:   Recent Labs   Lab Test 11/25/20  0639   WBC 4.3   HGB 9.5*   HCT 28.2*   *   *   IRON 94   IRONSAT 58*   RETICABSCT 78.2   RETP 3.0*   *   B12 2,139*   FOLIC 30.5     -Continue omeprazole 20mg daily   -Recommend recheck Hgb by PCP in next 1-2 weeks   -Return precautions discussed - melena, hematochezia, coffee ground emesis, dizziness, syncope   - Continue vitamin B12 1000 mcg daily, [id number] mg daily, folate 1 mg daily  - Continue multivitamin, Vit A, C, D2, E,  K       Hypothyroidism  TSH low 0.08 on admission, T4 free wnl 1.28. Had recent virtual visit with Endo who reduced her levothyroxine to 175 mcg dialy.  - continue levothyroxine 175 mcg     DM2  Recent virtual visit with Endocrine; discontinued metformin due to liver disease. Started on Januvia.  - continued pta januvia 100 mg daily      GERD  H/o gastric ulcers  -Continue  Omeprazole     Hyperlipidemia  - pta atorvastatin 20mg daily       Chronic thrombocytopenia  Ptls 125 on admission. Stable, at baseline. Likely secondary to liver disease.  - monitor     Anxiety  Depression  - pta venlafaxine 5 mg at bedtime      Insomnia  - holding pta trazodone in setting of encephalopathy at admission     Severe Protein-calorie malnutrition  -Seen by dietician during admission        Consultations This Hospital Stay   PHYSICAL THERAPY ADULT IP CONSULT  OCCUPATIONAL THERAPY ADULT IP CONSULT  CARE MANAGEMENT / SOCIAL WORK IP CONSULT  GI LUMINAL ADULT IP CONSULT  GI HEPATOLOGY ADULT IP CONSULT  VASCULAR ACCESS CARE ADULT IP CONSULT    Code Status   Full Code    Time Spent on this Encounter   Elham OLIVAS DO, personally saw the patient today and spent less than or equal to 30 minutes discharging this patient.       Elham Cleary DO  [facility] [address] [address]  Phone: [phone]  ______________________________________________________________________    Subjective: She reports feeling improved today. Mental status back to baseline. No bleeding, hematochezia, melena, vomiting. Nausea largely resolved, able to eat today.      Physical Exam   Vital Signs: Temp: 96  F (35.6  C) Temp src: Oral BP: 114/74 Pulse: 69   Resp: 16 SpO2: 100 % O2 Device: None (Room air)    Weight: 131 lbs 6.4 oz  Constitutional: no apparent distress, pleasant and cooperative   Cardiovascular: regular rate and rhythm, normal S1 and S2, no murmurs, rubs, or gallops noted noted, no bilateral lower extremity edema    Respiratory: clear to auscultation bilaterally, no wheezing, rales, or rhonchi, normal work of breathing   GI: abdomen soft, non tender, non distended, bowel sounds present, no appreciable ascites   Neuro: alert and oriented x4, no gross focal deficits noted, no asterixis        Primary Care Physician   Harleen Billy    Discharge Orders      Home care nursing referral      Care Coordination Referral      MD face to face encounter    Documentation of Face to Face and Certification for Home Health Services    I certify that patient: Susan Puckett is under my care and that I, or a nurse practitioner or physician's assistant working with me, had a face-to-face encounter that meets the physician face-to-face encounter requirements with this patient on: 11/24/2020.    This encounter with the patient was in whole, or in part, for the following medical condition, which is the primary reason for home health care: medical history significant for DM2, gastric ulcers, primary hypothyroidism, liver cirrhosis 2/2 CUETO c/b hepatic encephalopathy, depression, and h/o Faustino en Y gastric bypass who presented with fatigue, confusion and generalized weakness. She is admitted to medicine for acute hepatic encephalopathy.    I certify that, based on my findings, the following services are medically necessary home health services: Nursing and Physical Therapy.    My clinical findings support the need for the above services because: Nurse is needed: To provide assessment and oversight required in the home to assure adherence to the medical plan due to: difficulties managing home medications. Physical Therapy Services are needed to assess and treat the following functional impairments: fatigue, weakness, endurance.    Further, I certify that my clinical findings support that this patient is homebound (i.e. absences from home require considerable and taxing effort and are for medical reasons or [employer] services or infrequently or of  short duration when for other reasons) because: Leaving home is medically contraindicated for the following reason(s): Infection risk / immunocompromised state where it is safer for them to receive services in the home...    Based on the above findings. I certify that this patient is confined to the home and needs intermittent skilled nursing care, physical therapy and/or speech therapy.  The patient is under my care, and I have initiated the establishment of the plan of care.  This patient will be followed by a physician who will periodically review the plan of care.  Physician/Provider to provide follow up care: Harleen Billy    Attending hospital physician (the Medicare certified [place] provider): Elham Cleary DO  Physician Signature: See electronic signature associated with these discharge orders.  Date: 11/24/2020     Reason for your hospital stay    You were hospitalized for confusion due to your liver disease. This improved with lactulose. No infections were founds. Although your hemoglobin count dropped some, it has stabilized and you have no signs of bleeding. You were seen by the GI specialists as well who assisted in your care.     Adult [facility] Follow-up and recommended labs and tests    Follow up with primary care provider, Harleen Billy, within 7 days for hospital follow- up.  The following labs/tests are recommended: CBC (hemoglobin count).      Appointments on [place] and/or [facility] (with [facility] or [facility] provider or service). Call [phone] if you haven't heard regarding these appointments within 7 days of discharge.     Activity    Your activity upon discharge: activity as tolerated     When to contact your care team    Call your primary doctor or return to the emergency room if you have any of the following: recurrent confusion, bleeding, blood in your stools, black stools, vomiting blood or black contents, inability to eat due to vomiting, fevers, dizziness or feeling like you may  pass out.     Discharge Instructions    -Please follow-up with your primary care doctor in 1 week for a blood count (hemoglobin) check.   -Please continue taking all your medications as previously prescribed. A new script for omeprazole has been sent to your pharmacy.   -Talk to your GI specialist about getting an esophagram as an outpatient to further evaluate your difficulty swallowing     Full Code     Miscellaneous DME Order    DME Documentation:   Describe the reason for need to support medical necessity: Decreased mobility and deconditioning in the setting of liver cirrhosis.     I, the undersigned, certify that the above prescribed supplies are medically necessary for this patient and is both reasonable and necessary in reference to accepted standards of medical and necessary in reference to accepted standards of medical practice in the treatment of this patient's condition and is not prescribed as a convenience.     Diet    Follow this diet upon discharge: Orders Placed This Encounter      Snacks/Supplements Adult: Boost Glucose Control; Between Meals      Regular Diet Adult       Significant Results and Procedures   Most Recent 3 CBC's:  Recent Labs   Lab Test 11/25/20  0639 11/24/20  0645 11/23/20  0657   WBC 4.3 4.1 4.4   HGB 9.5* 9.5* 9.3*   * 106* 108*   * 100* 104*     Most Recent 3 INR's:  Recent Labs   Lab Test 11/25/20  0639 11/22/20  1406 10/26/20  0916   INR 1.23* 1.17* 1.23*     Most Recent TSH and T4:  Recent Labs   Lab Test 11/22/20  1406   TSH 0.08*   T4 1.28     Most Recent Anemia Panel:  Recent Labs   Lab Test 11/25/20  0639   WBC 4.3   HGB 9.5*   HCT 28.2*   *   *   IRON 94   IRONSAT 58*   RETICABSCT 78.2   RETP 3.0*   *   B12 2,139*   FOLIC 30.5       Discharge Medications   Discharge Medication List as of 11/25/2020  1:03 PM      START taking these medications    Details   sitagliptin (JANUVIA) 100 MG tablet Take 1 tablet (100 mg) by mouth daily,  Historical         CONTINUE these medications which have CHANGED    Details   omeprazole (PRILOSEC) 20 MG DR capsule Take 1 capsule (20 mg) by mouth daily, Disp-30 capsule, R-0, E-Prescribe         CONTINUE these medications which have NOT CHANGED    Details   atorvastatin (LIPITOR) 20 MG tablet Take 20 mg by mouth daily , Historical      calcium carbonate (TUMS) 500 MG chewable tablet Take 1 tablet by mouth daily as needed for heartburn , Historical      calcium citrate-vitamin D (CITRACAL W/D) 250-100 MG-UNIT tablet Take 2 tablets by mouth 2 times daily (with meals), Disp-336 tablet, R-3, E-Prescribe      cholecalciferol (VITAMIN D-1000 MAX ST) 25 MCG (1000 UT) TABS Take 4,000 Units by mouth, Historical      cyanocobalamin (VITAMIN B-12) 1000 MCG tablet Take 1,000 mcg by mouth daily , Historical      Ferrous Sulfate (IRON) 325 (65 FE) MG tablet Take 1 tablet by mouth daily, Historical      folic acid (FOLVITE) 1 MG tablet Take 1 mg by mouth daily , Historical      furosemide (LASIX) 40 MG tablet Take 40 mg by mouth 2 times daily , Historical      lactulose (CHRONULAC) 10 GM/15ML solution Take 20 g by mouth 2 times daily , Historical      levothyroxine (SYNTHROID) 175 MCG tablet Take 175 mcg by mouth every day except for Monday and Thursday, Historical      multivitamin (DEKAS ESSENTIAL) capsule Take 1 capsule by mouth daily , Historical      ondansetron (ZOFRAN-ODT) 4 MG ODT tab Place 4 mg under the tongue every 6 hours as needed for nausea , Historical      rifaximin (XIFAXAN) 550 MG TABS tablet Take 550 mg by mouth 2 times daily , Historical      spironolactone (ALDACTONE) 100 MG tablet Take 200 mg by mouth daily , Historical      SUMAtriptan (IMITREX) 50 MG tablet Take 50 mg by mouth at onset of headache for migraine , Historical      traZODone (DESYREL) 100 MG tablet Take  mg by mouth nightly as needed for sleep, Historical      valACYclovir (VALTREX) 1000 mg tablet Take 1,000 mg by mouth daily as needed  (at onset of cold sore) , Historical      venlafaxine (EFFEXOR) 75 MG tablet Take 1 tablet (75 mg) by mouth At Bedtime, Disp-30 tablet, R-0, E-PrescribeRenewal requests go to Brooke Billy with [facility].      vitamin A 3 MG (29067 UNITS) capsule Take 10,000 Units by mouth daily , Historical      vitamin C (ASCORBIC ACID) 100 MG tablet Take 100 mg by mouth daily , Historical      vitamin D2 (ERGOCALCIFEROL) 87976 units (1250 mcg) capsule Take 1 capsule (50,000 Units) by mouth once a week for 10 doses, Disp-10 capsule, R-0, E-Prescribe      vitamin E (TOCOPHEROL) 400 units (180 mg) capsule Take 400 Units by mouth daily , Historical      Vitamin K, Phytonadione, 100 MCG TABS Take 100 mcg by mouth daily , Historical         STOP taking these medications       metFORMIN (GLUCOPHAGE) 1000 MG tablet Comments:   Reason for Stopping:             Allergies   Allergies   Allergen Reactions     Prednisone      Nsaids Other (See Comments)

## 2020-11-27 NOTE — PROGRESS NOTES
[facility]: Post-Discharge Note  SITUATION                                                      Admission:    Admission Date: 11/22/20   Reason for Admission: Acute Hepatic Encephalopathy  Discharge:   Discharge Date: 11/25/20  Discharge Diagnosis: Acute Hepatic Encephalopathy  Discharge Service: Hospitalist    BACKGROUND                                                      Susan Puckett is a 50 year old female admitted on 11/22/2020. She has a past medical history significant for DM2, gastric ulcers, primary hypothyroidism, liver cirrhosis 2/2 CUETO c/b hepatic encephalopathy, depression, and h/o Faustino en Y gastric bypass who presented with fatigue, confusion and generalized weakness. She was admitted to medicine for acute hepatic encephalopathy, which was most likely due to constipation (large stool burden on abdominal CT at admission) and improved with lactulose.     ASSESSMENT      Discharge Assessment  Patient reports symptoms are: Unchanged([facility] nurse is coming tomorrow)  Does the patient have all of their medications?: Yes  Does patient know what their new medications are for?: Yes  Does patient have a follow-up appointment scheduled?: Yes  Does patient have any other questions or concerns?: No    Post-op  Did the patient have surgery or a procedure: No  Fever: No  Chills: No  Eating & Drinking: eating and drinking without complaints/concerns  PO Intake: regular diet  Bowel Function: normal  Urinary Status: voiding without complaint/concerns    PLAN                                                      Outpatient Plan:      Follow up with primary care provider, Harleen Billy, within 7 days for hospital follow- up.  The following labs/tests are recommended: CBC (hemoglobin count).      Future Appointments   Date Time Provider Department Center   12/2/2020 11:00 AM Roscoe, Brian H [facility]   12/9/2020  9:45 AM  LAB [facility]   12/9/2020 10:30 AM Shahzad Hatch PA-C [facility]  [facility]           Cecily Bradshaw, CMA

## 2020-11-28 LAB
BACTERIA SPEC CULT: NO GROWTH
BACTERIA SPEC CULT: NO GROWTH
SPECIMEN SOURCE: NORMAL
SPECIMEN SOURCE: NORMAL

## 2020-11-30 ENCOUNTER — TELEPHONE (OUTPATIENT)
Dept: TRANSPLANT | Facility: CLINIC | Age: 50
End: 2020-11-30

## 2020-11-30 NOTE — TELEPHONE ENCOUNTER
Called pt per request of txp coordinator re: protein supplementation. Had virtual liver txp eval 10/2020. Was recently inpatient and seen by RD. Called pt and LVM to call back if she has any questions.

## 2020-12-01 ENCOUNTER — TELEPHONE (OUTPATIENT)
Dept: TRANSPLANT | Facility: CLINIC | Age: 50
End: 2020-12-01

## 2020-12-01 NOTE — TELEPHONE ENCOUNTER
Patient Call: General  Route to LPN    Reason for call: connect with pt regarding checking in.       Call back needed? Yes    Return Call Needed  Same as documented in contacts section  When to return call?: Greater than one day: Route standard priority

## 2020-12-02 ENCOUNTER — VIRTUAL VISIT (OUTPATIENT)
Dept: BEHAVIORAL HEALTH | Facility: CLINIC | Age: 50
End: 2020-12-02
Payer: COMMERCIAL

## 2020-12-02 DIAGNOSIS — F33.1 MAJOR DEPRESSIVE DISORDER, RECURRENT EPISODE, MODERATE (H): Primary | ICD-10-CM

## 2020-12-02 PROCEDURE — 90834 PSYTX W PT 45 MINUTES: CPT | Mod: 95 | Performed by: PSYCHOLOGIST

## 2020-12-02 NOTE — LETTER
PHYSICIAN ORDERS    [phone]  DATE & TIME ISSUED: 20204:39 PM  PATIENT NAME: Susan Puckett   : 1970     [facility] MR# : [id number]     DIAGNOSIS:  Cirrhosis  ICD-10 CODE: 74.6  One time lab order for 2020:  Comprehensive panel  INR  CBCp     PLEASE FAX RESULTS AS SOON AS POSSIBLE TO [phone], ATTN:DE    .

## 2020-12-02 NOTE — PROGRESS NOTES
[facility]: Integrated Behavioral Health  December 2, 2020      Behavioral Health Clinician Progress Note    Patient Name: Susan Puckett           Service Type: Individual      Service Location:  Phone visit      Session Start Time: 11:03  Session End Time: 11:51      Session Length: 38 - 52      Attendees: Patient    Visit Activities (Refresh list every visit): [facility] Only    Telemedicine Visit: The patient's condition can be safely assessed and treated via synchronous audio and visual telemedicine encounter.      Reason for Telemedicine Visit: Services only offered telehealth    Originating Site (Patient Location): Patient's home    Distant Site (Provider Location): Provider Remote Setting    Consent:  The patient/guardian has verbally consented to: the potential risks and benefits of telemedicine (video visit) versus in person care; bill my insurance or make self-payment for services provided; and responsibility for payment of non-covered services.     Mode of Communication:  Video Conference via [employer]    As the provider I attest to compliance with applicable laws and regulations related to telemedicine.    Diagnostic Assessment Date: completed by Ozzie Augustin MD on 11/5  Treatment Plan Review Date: IP  See Flowsheets for today's PHQ-9 and AILIN-7 results  Previous PHQ-9:   PHQ-9 SCORE 11/5/2020   PHQ-9 Total Score MyChart 10 (Moderate depression)   PHQ-9 Total Score 10     Previous AILIN-7:   AILIN-7 SCORE 11/5/2020   Total Score 10 (moderate anxiety)   Total Score 10       LUIS LEVEL:  LUIS Score (Last Two) 10/18/2020   LUIS Raw Score 35   Activation Score 72.1   LUIS Level 3       DATA  Extended Session (60+ minutes): No  Interactive Complexity: No  Crisis: No    Treatment Objective(s) Addressed in This Session:  Target Behavior(s): disease management/lifestyle changes related to anxiety management    Anxiety: will experience a reduction in anxiety, will develop more effective  coping skills to manage anxiety symptoms, will develop healthy cognitive patterns and beliefs and will increase ability to function adaptively  Relationship Problems: will address relationship difficulties in a more adaptive manner  Grief / Loss: will increase understanding of normal grieving process and will engage in effective approach to address and resolve grief/loss issues    Current Stressors / Issues:  Susan presented today for our second [facility] appointment. She was briefly accompanied by her  who helped set up our video appointment. There were some initial [place] issues, so I provided them the [employer] Support number to assist with this.     Susan states she continues to struggle emotionally with her need for a liver transplant. We discussed her recent disappointments from family and friends in this regard; she states feeling frustrated and hurt that others say they will pursue evaluation to see if they are a viable match for donation, however don't follow-through. She says that she understands others have busy lives/other obligations, but it hurts that they don't follow-through. She states that she would prefer if others refused outright, as this would not get her hopes up about getting a possible donation.     Most of the session focused on Susan's relationship concerns. She talked about being bothered by her recent interactions with two friends. In these cases, she shared that they seem to not help her situation, either because they talk too much about themselves or are difficult to schedule with. Susan cited a particular instance where one of these friends voiced being unaware of a website she had set up, looking for a liver donation, despite her persistent efforts to advertise this on social media. She indicates feeling hurt by this.    I reflected to Susan the tension she seems to feel about disliking asking for help, but feeling pressure to do so and ultimately feeling disappointment/frustration from  set backs with others. Some themes of fear and guilt were evident, so we processed these as they came up.  I also reflected that her two friends do not seem emotionally available or particularly helpful for her for support in her transplant experiences, so we explored how she could prioritize other relationships and find ways to distance herself for now from them for better coping.     Progress on Treatment Objective(s) / Homework:  No improvement - ACTION (Actively working towards change); Intervened by reinforcing change plan / affirming steps taken    Motivational Interviewing    MI Intervention: Expressed Empathy/Understanding, Supported Autonomy, Collaboration, Evocation, Permission to raise concern or advise, Open-ended questions, Reflections: simple and complex, Change talk (evoked) and Reframe     Change Talk Expressed by the Patient: Desire to change    Provider Response to Change Talk: E - Evoked more info from patient about behavior change, A - Affirmed patient's thoughts, decisions, or attempts at behavior change, R - Reflected patient's change talk and S - Summarized patient's change talk statements    Also provided psychoeducation about behavioral health condition, symptoms, and treatment options    Interventions included supportive and insight-oriented counseling. Education about grief also provided.    Care Plan review completed: Yes    Medication Review:  No changes to current psychiatric medication(s)    Medication Compliance:  Yes    Changes in Health Issues:   Yes: Chronic disease management, Associated Psychological Distress    Chemical Use Review:   Substance Use: Chemical use reviewed, no active concerns identified      Tobacco Use: No current tobacco use.      Assessment: Current Emotional / Mental Status (status of significant symptoms):  Risk status (Self / Other harm or suicidal ideation)  Patient denies a history of suicidal ideation, suicide attempts, self-injurious behavior, homicidal  ideation, homicidal behavior and and other safety concerns     Patient denies current fears or concerns for personal safety.  Patient denies current or recent suicidal ideation or behaviors.  Patient denies current or recent homicidal ideation or behaviors.  Patient denies current or recent self injurious behavior or ideation.  Patient denies other safety concerns.     [place] Suicide Severity Rating Scale (Short Version)  [place] Suicide Severity Rating (Short Version) 10/25/2020 11/22/2020   Over the past 2 weeks have you felt down, depressed, or hopeless? no no   Over the past 2 weeks have you had thoughts of killing yourself? no no   Have you ever attempted to kill yourself? no no       A safety and risk management plan has not been developed at this time, however patient was encouraged to call [place] [phone] should there be a change in any of these risk factors.    Appearance:   Appropriate   Eye Contact:   Good   Psychomotor Behavior: Normal   Attitude:   Cooperative  Friendly Pleasant  Orientation:   All  Speech   Rate / Production: Normal    Volume:  Normal   Mood:    Sad   Affect:    Appropriate   Thought Content:  Clear   Thought Form:  Coherent  Logical   Insight:    Good     Diagnoses:  1. Major depressive disorder, recurrent episode, moderate (H)        Collateral Reports Completed:  Not Applicable    Plan: (Homework, other):  Patient was given information about behavioral services and encouraged to schedule a follow up appointment with the clinic [facility] in 3 weeks.  She was also given information about mental health symptoms and treatment options  and strategies to help with relationship troubles.  CD Recommendations: No indications of CD issues. Sancho Gaines, ALEXIS

## 2020-12-04 ENCOUNTER — TELEPHONE (OUTPATIENT)
Dept: TRANSPLANT | Facility: CLINIC | Age: 50
End: 2020-12-04

## 2020-12-04 ENCOUNTER — HOSPITAL ENCOUNTER (INPATIENT)
Facility: CLINIC | Age: 50
LOS: 4 days | Discharge: HOME HEALTH | DRG: 442 | End: 2020-12-08
Attending: EMERGENCY MEDICINE | Admitting: HOSPITALIST
Payer: COMMERCIAL

## 2020-12-04 DIAGNOSIS — K75.81 LIVER CIRRHOSIS SECONDARY TO NASH (H): ICD-10-CM

## 2020-12-04 DIAGNOSIS — K74.60 LIVER CIRRHOSIS SECONDARY TO NASH (H): ICD-10-CM

## 2020-12-04 DIAGNOSIS — E03.9 PRIMARY HYPOTHYROIDISM: Primary | ICD-10-CM

## 2020-12-04 DIAGNOSIS — Z20.828 CONTACT WITH AND (SUSPECTED) EXPOSURE TO OTHER VIRAL COMMUNICABLE DISEASES: ICD-10-CM

## 2020-12-04 DIAGNOSIS — K76.82 HEPATIC ENCEPHALOPATHY (H): ICD-10-CM

## 2020-12-04 LAB — AMMONIA PLAS-SCNC: 154 UMOL/L (ref 10–50)

## 2020-12-04 PROCEDURE — 250N000011 HC RX IP 250 OP 636: Performed by: EMERGENCY MEDICINE

## 2020-12-04 PROCEDURE — C9803 HOPD COVID-19 SPEC COLLECT: HCPCS | Performed by: EMERGENCY MEDICINE

## 2020-12-04 PROCEDURE — 99285 EMERGENCY DEPT VISIT HI MDM: CPT | Mod: 25 | Performed by: EMERGENCY MEDICINE

## 2020-12-04 PROCEDURE — U0003 INFECTIOUS AGENT DETECTION BY NUCLEIC ACID (DNA OR RNA); SEVERE ACUTE RESPIRATORY SYNDROME CORONAVIRUS 2 (SARS-COV-2) (CORONAVIRUS DISEASE [COVID-19]), AMPLIFIED PROBE TECHNIQUE, MAKING USE OF HIGH THROUGHPUT TECHNOLOGIES AS DESCRIBED BY CMS-2020-01-R: HCPCS | Performed by: EMERGENCY MEDICINE

## 2020-12-04 PROCEDURE — 120N000002 HC R&B MED SURG/OB UMMC

## 2020-12-04 PROCEDURE — 99285 EMERGENCY DEPT VISIT HI MDM: CPT | Performed by: EMERGENCY MEDICINE

## 2020-12-04 PROCEDURE — 250N000013 HC RX MED GY IP 250 OP 250 PS 637: Performed by: EMERGENCY MEDICINE

## 2020-12-04 PROCEDURE — 96374 THER/PROPH/DIAG INJ IV PUSH: CPT | Performed by: EMERGENCY MEDICINE

## 2020-12-04 PROCEDURE — 82140 ASSAY OF AMMONIA: CPT | Performed by: EMERGENCY MEDICINE

## 2020-12-04 RX ORDER — LACTULOSE 10 G/15ML
20 SOLUTION ORAL ONCE
Status: COMPLETED | OUTPATIENT
Start: 2020-12-04 | End: 2020-12-04

## 2020-12-04 RX ORDER — ONDANSETRON 2 MG/ML
8 INJECTION INTRAMUSCULAR; INTRAVENOUS ONCE
Status: COMPLETED | OUTPATIENT
Start: 2020-12-04 | End: 2020-12-04

## 2020-12-04 RX ADMIN — LACTULOSE 20 G: 10 SOLUTION ORAL at 23:30

## 2020-12-04 RX ADMIN — ONDANSETRON 8 MG: 2 INJECTION INTRAMUSCULAR; INTRAVENOUS at 23:30

## 2020-12-04 ASSESSMENT — ENCOUNTER SYMPTOMS
FATIGUE: 1
SHORTNESS OF BREATH: 1
CONFUSION: 1
DIARRHEA: 1
NAUSEA: 1
ABDOMINAL PAIN: 0
VOMITING: 0
FEVER: 0

## 2020-12-04 NOTE — TELEPHONE ENCOUNTER
This writer called Susan and connected with her regarding financial assistance. I inquired if she had looked into resources provided to her previously. She indicated that she had not at this time. I ask about applying for social security disability. She reported that she has not, as she is still receiving long term disability and was told that it would interfere with that income. I spoke with her regarding fundraising through family/friends, meal trains, etc. She reported that she has considered that, but has not done so yet. I attempted to normalize the experience and process with her. She voiced that she was more open to creating a  for their financial needs. Susan reported that many of her friends/family members have encouraged her to  as well. I provided the resources of open arms to them in addition to the resources already provided.     Susan has not worked for a period of time due to health. Her spouse is the sole income for their household at this time. Her needed to be out for a period of time from his work to quarantine and in the month of November has to be out again for 14 days without pay. Susan reported that this contributed it to them not being able to pay their mortgage this month. I asked her about their financial planning for next month. She reported that she does not believe next month will be an issue due to him being back at work.     Susan was tearful on the phone and voiced appreciate for resources. I indicated that I will communicate needs to our manuel assistance department.     I filled out assistance form and send mortgage information/form to manuel assistance department. I reminded Susan that if approved, this is a one time manuel through our department that cannot be applied for/granted again.      RENARD Coronado, [facility]  Liver Transplant   [facility]  Phone: [phone]  Pager: [phone]

## 2020-12-04 NOTE — TELEPHONE ENCOUNTER
Transplant Social Work Services Phone Call    Data: This writer received a call from Susan inquiring about additional assistance and indicated that they are unable pay some of their bills. Historically Susan was provided information on how to apply for social security disability, utility assistance, gayathri care and county information to seek additional support. I received Susan and her spouses mortgage information and I called Susan to inquire more about finances. I asked her to call me back  Intervention: Deferred  Assessment: Deferred  Education provided by SW: None at this time  Plan: Waiting for return call     RENARD Coronado, [facility]  Liver Transplant   [facility]  Phone: [phone]  Pager: [phone]

## 2020-12-05 ENCOUNTER — APPOINTMENT (OUTPATIENT)
Dept: ULTRASOUND IMAGING | Facility: CLINIC | Age: 50
DRG: 442 | End: 2020-12-05
Attending: HOSPITALIST
Payer: COMMERCIAL

## 2020-12-05 ENCOUNTER — APPOINTMENT (OUTPATIENT)
Dept: GENERAL RADIOLOGY | Facility: CLINIC | Age: 50
DRG: 442 | End: 2020-12-05
Payer: COMMERCIAL

## 2020-12-05 LAB
ALBUMIN SERPL-MCNC: 2.6 G/DL (ref 3.4–5)
ALBUMIN UR-MCNC: NEGATIVE MG/DL
ALP SERPL-CCNC: 136 U/L (ref 40–150)
ALT SERPL W P-5'-P-CCNC: 28 U/L (ref 0–50)
ANION GAP SERPL CALCULATED.3IONS-SCNC: 6 MMOL/L (ref 3–14)
APPEARANCE UR: CLEAR
AST SERPL W P-5'-P-CCNC: 35 U/L (ref 0–45)
BASOPHILS # BLD AUTO: 0 10E9/L (ref 0–0.2)
BASOPHILS NFR BLD AUTO: 0.2 %
BILIRUB SERPL-MCNC: 0.7 MG/DL (ref 0.2–1.3)
BILIRUB UR QL STRIP: NEGATIVE
BUN SERPL-MCNC: 16 MG/DL (ref 7–30)
CALCIUM SERPL-MCNC: 8.9 MG/DL (ref 8.5–10.1)
CHLORIDE SERPL-SCNC: 100 MMOL/L (ref 94–109)
CO2 SERPL-SCNC: 29 MMOL/L (ref 20–32)
COLOR UR AUTO: ABNORMAL
CREAT SERPL-MCNC: 1.05 MG/DL (ref 0.52–1.04)
DIFFERENTIAL METHOD BLD: ABNORMAL
EOSINOPHIL # BLD AUTO: 0.1 10E9/L (ref 0–0.7)
EOSINOPHIL NFR BLD AUTO: 2.4 %
ERYTHROCYTE [DISTWIDTH] IN BLOOD BY AUTOMATED COUNT: 13.5 % (ref 10–15)
GFR SERPL CREATININE-BSD FRML MDRD: 63 ML/MIN/{1.73_M2}
GLUCOSE BLDC GLUCOMTR-MCNC: 108 MG/DL (ref 70–99)
GLUCOSE BLDC GLUCOMTR-MCNC: 128 MG/DL (ref 70–99)
GLUCOSE BLDC GLUCOMTR-MCNC: 138 MG/DL (ref 70–99)
GLUCOSE BLDC GLUCOMTR-MCNC: 141 MG/DL (ref 70–99)
GLUCOSE BLDC GLUCOMTR-MCNC: 157 MG/DL (ref 70–99)
GLUCOSE SERPL-MCNC: 231 MG/DL (ref 70–99)
GLUCOSE UR STRIP-MCNC: NEGATIVE MG/DL
HCT VFR BLD AUTO: 31 % (ref 35–47)
HGB BLD-MCNC: 10 G/DL (ref 11.7–15.7)
HGB UR QL STRIP: NEGATIVE
HYALINE CASTS #/AREA URNS LPF: 4 /LPF (ref 0–2)
IMM GRANULOCYTES # BLD: 0 10E9/L (ref 0–0.4)
IMM GRANULOCYTES NFR BLD: 0.2 %
KETONES UR STRIP-MCNC: NEGATIVE MG/DL
LABORATORY COMMENT REPORT: NORMAL
LEUKOCYTE ESTERASE UR QL STRIP: NEGATIVE
LYMPHOCYTES # BLD AUTO: 1.5 10E9/L (ref 0.8–5.3)
LYMPHOCYTES NFR BLD AUTO: 35.5 %
MAGNESIUM SERPL-MCNC: 1.9 MG/DL (ref 1.6–2.3)
MCH RBC QN AUTO: 35 PG (ref 26.5–33)
MCHC RBC AUTO-ENTMCNC: 32.3 G/DL (ref 31.5–36.5)
MCV RBC AUTO: 108 FL (ref 78–100)
MONOCYTES # BLD AUTO: 0.4 10E9/L (ref 0–1.3)
MONOCYTES NFR BLD AUTO: 8.8 %
NEUTROPHILS # BLD AUTO: 2.2 10E9/L (ref 1.6–8.3)
NEUTROPHILS NFR BLD AUTO: 52.9 %
NITRATE UR QL: NEGATIVE
NRBC # BLD AUTO: 0 10*3/UL
NRBC BLD AUTO-RTO: 0 /100
PH UR STRIP: 6.5 PH (ref 5–7)
PHOSPHATE SERPL-MCNC: 4.1 MG/DL (ref 2.5–4.5)
PLATELET # BLD AUTO: 104 10E9/L (ref 150–450)
POTASSIUM SERPL-SCNC: 3.8 MMOL/L (ref 3.4–5.3)
POTASSIUM SERPL-SCNC: 3.8 MMOL/L (ref 3.4–5.3)
PROT SERPL-MCNC: 6.9 G/DL (ref 6.8–8.8)
RBC # BLD AUTO: 2.86 10E12/L (ref 3.8–5.2)
RBC #/AREA URNS AUTO: 0 /HPF (ref 0–2)
SARS-COV-2 RNA SPEC QL NAA+PROBE: NEGATIVE
SARS-COV-2 RNA SPEC QL NAA+PROBE: NORMAL
SODIUM SERPL-SCNC: 135 MMOL/L (ref 133–144)
SOURCE: ABNORMAL
SP GR UR STRIP: 1 (ref 1–1.03)
SPECIMEN SOURCE: NORMAL
SPECIMEN SOURCE: NORMAL
SQUAMOUS #/AREA URNS AUTO: <1 /HPF (ref 0–1)
UROBILINOGEN UR STRIP-MCNC: NORMAL MG/DL (ref 0–2)
WBC # BLD AUTO: 4.1 10E9/L (ref 4–11)
WBC #/AREA URNS AUTO: 0 /HPF (ref 0–5)

## 2020-12-05 PROCEDURE — 250N000012 HC RX MED GY IP 250 OP 636 PS 637: Performed by: HOSPITALIST

## 2020-12-05 PROCEDURE — 93975 VASCULAR STUDY: CPT | Mod: 26 | Performed by: RADIOLOGY

## 2020-12-05 PROCEDURE — 85025 COMPLETE CBC W/AUTO DIFF WBC: CPT | Performed by: STUDENT IN AN ORGANIZED HEALTH CARE EDUCATION/TRAINING PROGRAM

## 2020-12-05 PROCEDURE — 81001 URINALYSIS AUTO W/SCOPE: CPT | Performed by: EMERGENCY MEDICINE

## 2020-12-05 PROCEDURE — 71045 X-RAY EXAM CHEST 1 VIEW: CPT

## 2020-12-05 PROCEDURE — 250N000013 HC RX MED GY IP 250 OP 250 PS 637: Performed by: STUDENT IN AN ORGANIZED HEALTH CARE EDUCATION/TRAINING PROGRAM

## 2020-12-05 PROCEDURE — 250N000013 HC RX MED GY IP 250 OP 250 PS 637: Performed by: HOSPITALIST

## 2020-12-05 PROCEDURE — 93975 VASCULAR STUDY: CPT

## 2020-12-05 PROCEDURE — 250N000011 HC RX IP 250 OP 636: Performed by: HOSPITALIST

## 2020-12-05 PROCEDURE — 36415 COLL VENOUS BLD VENIPUNCTURE: CPT | Performed by: HOSPITALIST

## 2020-12-05 PROCEDURE — 120N000002 HC R&B MED SURG/OB UMMC

## 2020-12-05 PROCEDURE — 83735 ASSAY OF MAGNESIUM: CPT | Performed by: HOSPITALIST

## 2020-12-05 PROCEDURE — 84100 ASSAY OF PHOSPHORUS: CPT | Performed by: HOSPITALIST

## 2020-12-05 PROCEDURE — 999N001017 HC STATISTIC GLUCOSE BY METER IP

## 2020-12-05 PROCEDURE — 71045 X-RAY EXAM CHEST 1 VIEW: CPT | Mod: 26 | Performed by: RADIOLOGY

## 2020-12-05 PROCEDURE — 80053 COMPREHEN METABOLIC PANEL: CPT | Performed by: STUDENT IN AN ORGANIZED HEALTH CARE EDUCATION/TRAINING PROGRAM

## 2020-12-05 RX ORDER — ASCORBIC ACID 250 MG
125 TABLET ORAL DAILY
Status: DISCONTINUED | OUTPATIENT
Start: 2020-12-05 | End: 2020-12-08 | Stop reason: HOSPADM

## 2020-12-05 RX ORDER — FOLIC ACID 1 MG/1
1 TABLET ORAL DAILY
Status: DISCONTINUED | OUTPATIENT
Start: 2020-12-05 | End: 2020-12-08 | Stop reason: HOSPADM

## 2020-12-05 RX ORDER — AMOXICILLIN 250 MG
1 CAPSULE ORAL 2 TIMES DAILY
Status: DISCONTINUED | OUTPATIENT
Start: 2020-12-05 | End: 2020-12-05

## 2020-12-05 RX ORDER — LANOLIN ALCOHOL/MO/W.PET/CERES
1000 CREAM (GRAM) TOPICAL DAILY
Status: DISCONTINUED | OUTPATIENT
Start: 2020-12-05 | End: 2020-12-08 | Stop reason: HOSPADM

## 2020-12-05 RX ORDER — FOLIC ACID 5 MG/ML
1 INJECTION, SOLUTION INTRAMUSCULAR; INTRAVENOUS; SUBCUTANEOUS DAILY
Status: DISCONTINUED | OUTPATIENT
Start: 2020-12-05 | End: 2020-12-08 | Stop reason: HOSPADM

## 2020-12-05 RX ORDER — ONDANSETRON 4 MG/1
4 TABLET, ORALLY DISINTEGRATING ORAL EVERY 6 HOURS PRN
Status: DISCONTINUED | OUTPATIENT
Start: 2020-12-05 | End: 2020-12-08 | Stop reason: HOSPADM

## 2020-12-05 RX ORDER — LACTULOSE 10 G/15ML
100 SOLUTION ORAL
Status: DISCONTINUED | OUTPATIENT
Start: 2020-12-05 | End: 2020-12-08 | Stop reason: HOSPADM

## 2020-12-05 RX ORDER — POLYETHYLENE GLYCOL 3350 17 G/17G
17 POWDER, FOR SOLUTION ORAL DAILY
Status: DISCONTINUED | OUTPATIENT
Start: 2020-12-05 | End: 2020-12-08 | Stop reason: HOSPADM

## 2020-12-05 RX ORDER — CHOLECALCIFEROL (VITAMIN D3) 125 MCG
10000 CAPSULE ORAL DAILY
Status: DISCONTINUED | OUTPATIENT
Start: 2020-12-05 | End: 2020-12-08 | Stop reason: HOSPADM

## 2020-12-05 RX ORDER — ACETAMINOPHEN 325 MG/1
650 TABLET ORAL EVERY 6 HOURS PRN
Status: DISCONTINUED | OUTPATIENT
Start: 2020-12-05 | End: 2020-12-08 | Stop reason: HOSPADM

## 2020-12-05 RX ORDER — FERROUS SULFATE 325(65) MG
325 TABLET ORAL DAILY
Status: DISCONTINUED | OUTPATIENT
Start: 2020-12-05 | End: 2020-12-08 | Stop reason: HOSPADM

## 2020-12-05 RX ORDER — NICOTINE POLACRILEX 4 MG
15-30 LOZENGE BUCCAL
Status: DISCONTINUED | OUTPATIENT
Start: 2020-12-05 | End: 2020-12-08 | Stop reason: HOSPADM

## 2020-12-05 RX ORDER — VITAMIN B COMPLEX
4000 TABLET ORAL DAILY
Status: DISCONTINUED | OUTPATIENT
Start: 2020-12-05 | End: 2020-12-08 | Stop reason: HOSPADM

## 2020-12-05 RX ORDER — FUROSEMIDE 20 MG
40 TABLET ORAL
Status: DISCONTINUED | OUTPATIENT
Start: 2020-12-05 | End: 2020-12-08 | Stop reason: HOSPADM

## 2020-12-05 RX ORDER — LANOLIN ALCOHOL/MO/W.PET/CERES
100 CREAM (GRAM) TOPICAL DAILY
Status: DISCONTINUED | OUTPATIENT
Start: 2020-12-05 | End: 2020-12-08 | Stop reason: HOSPADM

## 2020-12-05 RX ORDER — VENLAFAXINE 75 MG/1
75 TABLET ORAL AT BEDTIME
Status: DISCONTINUED | OUTPATIENT
Start: 2020-12-05 | End: 2020-12-08 | Stop reason: HOSPADM

## 2020-12-05 RX ORDER — CALCIUM CARBONATE 500 MG/1
500 TABLET, CHEWABLE ORAL DAILY PRN
Status: DISCONTINUED | OUTPATIENT
Start: 2020-12-05 | End: 2020-12-08 | Stop reason: HOSPADM

## 2020-12-05 RX ORDER — DEXTROSE MONOHYDRATE 25 G/50ML
25-50 INJECTION, SOLUTION INTRAVENOUS
Status: DISCONTINUED | OUTPATIENT
Start: 2020-12-05 | End: 2020-12-08 | Stop reason: HOSPADM

## 2020-12-05 RX ORDER — VITAMIN E 268 MG
400 CAPSULE ORAL DAILY
Status: DISCONTINUED | OUTPATIENT
Start: 2020-12-05 | End: 2020-12-08 | Stop reason: HOSPADM

## 2020-12-05 RX ORDER — SUMATRIPTAN 50 MG/1
50 TABLET, FILM COATED ORAL
Status: DISCONTINUED | OUTPATIENT
Start: 2020-12-05 | End: 2020-12-08 | Stop reason: HOSPADM

## 2020-12-05 RX ORDER — TRAZODONE HYDROCHLORIDE 50 MG/1
50-150 TABLET, FILM COATED ORAL
Status: DISCONTINUED | OUTPATIENT
Start: 2020-12-05 | End: 2020-12-08 | Stop reason: HOSPADM

## 2020-12-05 RX ORDER — LIDOCAINE 40 MG/G
CREAM TOPICAL
Status: DISCONTINUED | OUTPATIENT
Start: 2020-12-05 | End: 2020-12-08 | Stop reason: HOSPADM

## 2020-12-05 RX ORDER — AMOXICILLIN 250 MG
2 CAPSULE ORAL 2 TIMES DAILY
Status: DISCONTINUED | OUTPATIENT
Start: 2020-12-05 | End: 2020-12-05

## 2020-12-05 RX ORDER — LACTULOSE 10 G/15ML
20 SOLUTION ORAL
Status: DISCONTINUED | OUTPATIENT
Start: 2020-12-05 | End: 2020-12-08 | Stop reason: HOSPADM

## 2020-12-05 RX ORDER — FOLIC ACID 1 MG/1
1 TABLET ORAL DAILY
Status: DISCONTINUED | OUTPATIENT
Start: 2020-12-05 | End: 2020-12-05

## 2020-12-05 RX ORDER — ATORVASTATIN CALCIUM 20 MG/1
20 TABLET, FILM COATED ORAL DAILY
Status: DISCONTINUED | OUTPATIENT
Start: 2020-12-05 | End: 2020-12-08 | Stop reason: HOSPADM

## 2020-12-05 RX ORDER — SPIRONOLACTONE 100 MG/1
200 TABLET, FILM COATED ORAL DAILY
Status: DISCONTINUED | OUTPATIENT
Start: 2020-12-05 | End: 2020-12-08 | Stop reason: HOSPADM

## 2020-12-05 RX ORDER — THIAMINE HYDROCHLORIDE 100 MG/ML
100 INJECTION, SOLUTION INTRAMUSCULAR; INTRAVENOUS DAILY
Status: DISCONTINUED | OUTPATIENT
Start: 2020-12-05 | End: 2020-12-08 | Stop reason: HOSPADM

## 2020-12-05 RX ORDER — LACTULOSE 10 G/15ML
20 SOLUTION ORAL 2 TIMES DAILY
Status: DISCONTINUED | OUTPATIENT
Start: 2020-12-05 | End: 2020-12-05

## 2020-12-05 RX ADMIN — FUROSEMIDE 40 MG: 20 TABLET ORAL at 15:52

## 2020-12-05 RX ADMIN — SUMATRIPTAN SUCCINATE 50 MG: 50 TABLET ORAL at 05:24

## 2020-12-05 RX ADMIN — ACETAMINOPHEN 650 MG: 325 TABLET, FILM COATED ORAL at 10:38

## 2020-12-05 RX ADMIN — CYANOCOBALAMIN TAB 1000 MCG 1000 MCG: 1000 TAB at 10:28

## 2020-12-05 RX ADMIN — RIFAXIMIN 550 MG: 550 TABLET ORAL at 10:27

## 2020-12-05 RX ADMIN — VENLAFAXINE 75 MG: 75 TABLET ORAL at 05:23

## 2020-12-05 RX ADMIN — Medication 0.1 MG: at 10:30

## 2020-12-05 RX ADMIN — FOLIC ACID 1 MG: 1 TABLET ORAL at 10:28

## 2020-12-05 RX ADMIN — LACTULOSE 20 G: 20 POWDER, FOR SOLUTION ORAL at 13:27

## 2020-12-05 RX ADMIN — SPIRONOLACTONE 200 MG: 100 TABLET, FILM COATED ORAL at 10:29

## 2020-12-05 RX ADMIN — VENLAFAXINE 75 MG: 75 TABLET ORAL at 22:08

## 2020-12-05 RX ADMIN — INSULIN ASPART 1 UNITS: 100 INJECTION, SOLUTION INTRAVENOUS; SUBCUTANEOUS at 13:34

## 2020-12-05 RX ADMIN — VITAMIN E CAP 400 UNIT 400 UNITS: 400 CAP at 10:40

## 2020-12-05 RX ADMIN — THIAMINE HCL TAB 100 MG 100 MG: 100 TAB at 10:28

## 2020-12-05 RX ADMIN — ATORVASTATIN CALCIUM 20 MG: 20 TABLET, FILM COATED ORAL at 10:26

## 2020-12-05 RX ADMIN — Medication 125 MG: at 10:29

## 2020-12-05 RX ADMIN — Medication 4000 UNITS: at 10:36

## 2020-12-05 RX ADMIN — Medication 10000 UNITS: at 10:39

## 2020-12-05 RX ADMIN — OMEPRAZOLE 20 MG: 20 CAPSULE, DELAYED RELEASE ORAL at 10:27

## 2020-12-05 RX ADMIN — LEVOTHYROXINE SODIUM 175 MCG: 125 TABLET ORAL at 10:37

## 2020-12-05 RX ADMIN — FUROSEMIDE 40 MG: 20 TABLET ORAL at 10:27

## 2020-12-05 RX ADMIN — FERROUS SULFATE TAB 325 MG (65 MG ELEMENTAL FE) 325 MG: 325 (65 FE) TAB at 10:37

## 2020-12-05 RX ADMIN — Medication 2 TABLET: at 10:38

## 2020-12-05 RX ADMIN — ONDANSETRON 4 MG: 4 TABLET, ORALLY DISINTEGRATING ORAL at 10:44

## 2020-12-05 RX ADMIN — LACTULOSE 20 G: 20 POWDER, FOR SOLUTION ORAL at 19:43

## 2020-12-05 RX ADMIN — ENOXAPARIN SODIUM 40 MG: 40 INJECTION SUBCUTANEOUS at 10:37

## 2020-12-05 RX ADMIN — RIFAXIMIN 550 MG: 550 TABLET ORAL at 19:44

## 2020-12-05 RX ADMIN — Medication 2 TABLET: at 17:47

## 2020-12-05 ASSESSMENT — ACTIVITIES OF DAILY LIVING (ADL)
ADLS_ACUITY_SCORE: 18
DIFFICULTY_COMMUNICATING: NO
EQUIPMENT_CURRENTLY_USED_AT_HOME: GLUCOMETER
ADLS_ACUITY_SCORE: 18
ADLS_ACUITY_SCORE: 17
CONCENTRATING,_REMEMBERING_OR_MAKING_DECISIONS_DIFFICULTY: NO
DRESSING/BATHING_DIFFICULTY: NO
TOILETING_ISSUES: NO
ADLS_ACUITY_SCORE: 17
HEARING_DIFFICULTY_OR_DEAF: NO
PATIENT_/_FAMILY_COMMUNICATION_STYLE: SPOKEN LANGUAGE (ENGLISH OR BILINGUAL)
FALL_HISTORY_WITHIN_LAST_SIX_MONTHS: NO
ADLS_ACUITY_SCORE: 18
DIFFICULTY_EATING/SWALLOWING: NO
DOING_ERRANDS_INDEPENDENTLY_DIFFICULTY: NO
WEAR_GLASSES_OR_BLIND: YES
WALKING_OR_CLIMBING_STAIRS_DIFFICULTY: NO

## 2020-12-05 ASSESSMENT — MIFFLIN-ST. JEOR: SCORE: 1245.04

## 2020-12-05 NOTE — PROGRESS NOTES
"Pt admitted from ED at 0330.  Per ED MD note:  Admitted for fatigue. Patient has a history of liver cirrhosis 2/2 CUETO c/b hepatic encephalopathy, type 2 diabetes mellitus. Patient reports feeling \"foggy\" and exhausted today. She states she is having trouble keeping her eyes open.     AVSS.  A&Ox4.    Pt states she feels lethargic/tired, able to answer questions appropriately. And carries on great conversations.  Ammonia=154 in ED.  Up with  SBA, falls precautions.    Pt with gen weakness.  Denies pain.  Complains of HA - Imitrex x1.  Cont to monitor closely.  Skin intact.  Pt has L PIV and L arm diabetic sensor.  Voiding, no difficulties.  Loose stools once on noc shift, and zero this shift.  Lactulose powder x 1 - needs 2 more today - per home regimine.  Poor appetite eating 50%.  US of abd done  Chest xray done  Cont with POC.  "

## 2020-12-05 NOTE — ED PROVIDER NOTES
"ED Provider Note  [facility]      History     Chief Complaint   Patient presents with     Fatigue     The history is provided by the patient and medical records.     Susan Puckett is a 50 year old female who presents to the Emergency Department with fatigue. Patient has a history of liver cirrhosis 2/2 CUETO c/b hepatic encephalopathy, type 2 diabetes mellitus. Patient reports feeling \"foggy\" and exhausted today. She states she is having trouble keeping her eyes open. She had hepatic encephalopathy a few weeks ago and states this feels similar. Patient reports she had slight shortness of breath yesterday as well. She states she has been having loose stools, with 4 episodes today. Patient also has nausea, but no vomiting. Patient reports she was seen by her PCP today and had labs drawn and had a high ammonia level. She talked with the liver coordinator and was advised to come in to the ED. Patient denies fevers, chest pain, or abdominal pain. No history of heart or kidney disease.    Per chart review, patient was recently admitted from 11/22-11/25/2020 with fatigue, confusion, and generalized weakness. Patient's ammonia was 122 on admission. CT head and CT A/P without acute pathology except for large stool burden. Patient improved with lactulose and constipation was thought to likely be the cause of acute hepatic encephalopathy.    Past Medical History  Past Medical History:   Diagnosis Date     Depressive disorder      Diabetes (H)     Type 2 DM/No Insulin      History of blood transfusion     10/2019     Liver cirrhosis secondary to CUETO (H) 05/28/2020     Thyroid disease      Past Surgical History:   Procedure Laterality Date     APPENDECTOMY      1989     COLONOSCOPY      1/2020 at [facility]      ENT SURGERY       GI SURGERY      EGD August 2020 at [facility]      GYN SURGERY      2010     RNY gastric bypass  01/2006    retoocolic, retrogastric, [facility]     VASCULAR SURGERY   "          atorvastatin (LIPITOR) 20 MG tablet       calcium carbonate (TUMS) 500 MG chewable tablet       calcium citrate-vitamin D (CITRACAL W/D) 250-100 MG-UNIT tablet       cholecalciferol (VITAMIN D-1000 MAX ST) 25 MCG (1000 UT) TABS       cyanocobalamin (VITAMIN B-12) 1000 MCG tablet       Ferrous Sulfate (IRON) 325 (65 FE) MG tablet       folic acid (FOLVITE) 1 MG tablet       furosemide (LASIX) 40 MG tablet       lactulose (CHRONULAC) 10 GM/15ML solution       levothyroxine (SYNTHROID) 175 MCG tablet       multivitamin (DEKAS ESSENTIAL) capsule       omeprazole (PRILOSEC) 20 MG DR capsule       ondansetron (ZOFRAN-ODT) 4 MG ODT tab       rifaximin (XIFAXAN) 550 MG TABS tablet       sitagliptin (JANUVIA) 100 MG tablet       spironolactone (ALDACTONE) 100 MG tablet       SUMAtriptan (IMITREX) 50 MG tablet       traZODone (DESYREL) 100 MG tablet       valACYclovir (VALTREX) 1000 mg tablet       venlafaxine (EFFEXOR) 75 MG tablet       vitamin A 3 MG (98294 UNITS) capsule       vitamin C (ASCORBIC ACID) 100 MG tablet       vitamin D2 (ERGOCALCIFEROL) 10724 units (1250 mcg) capsule       vitamin E (TOCOPHEROL) 400 units (180 mg) capsule       Vitamin K, Phytonadione, 100 MCG TABS      Allergies   Allergen Reactions     Prednisone      Nsaids Other (See Comments)     Family History  History reviewed. No pertinent family history.  Social History   Social History     Tobacco Use     Smoking status: Never Smoker     Smokeless tobacco: Never Used   Substance Use Topics     Alcohol use: Not Currently     Frequency: Never     Binge frequency: Never     Comment: Last drink was in 2017     Drug use: Never      Past medical history, past surgical history, medications, allergies, family history, and social history were reviewed with the patient. No additional pertinent items.       Review of Systems   Constitutional: Positive for fatigue. Negative for fever.   Respiratory: Positive for shortness of breath.    Cardiovascular:  Negative for chest pain.   Gastrointestinal: Positive for diarrhea and nausea. Negative for abdominal pain and vomiting.   Psychiatric/Behavioral: Positive for confusion.   All other systems reviewed and are negative.    A complete review of systems was performed with pertinent positives and negatives noted in the HPI, and all other systems negative.    Physical Exam   BP: 107/72  Pulse: 80  Temp: 98  F (36.7  C)  Resp: 16  SpO2: 100 %  Physical Exam    GEN:  Slightly sleepy appearing, well developed, no acute distress  HEENT:  PERRL, EOMI, Mucous membranes are moist.   Cardio:  RRR, no murmur, radial pulses equal bilaterally  PULM:  Lungs clear, good air movement, no wheezes, rales   Abd:  Soft, normal bowel sounds, no focal tenderness  Back exam:  No CVA tenderness  Musculoskeletal:  normal range of motion, no lower extremity swelling or calf tenderness  Neuro:  Alert and oriented X3, Follows commands, moving all extremities spontaneously, has some difficulty remembering names and events.  Skin:  Warm, dry   ED Course   11:05 PM  The patient was seen and examined by Onelia Pires MD in Room ED10.     Procedures           Labs are shown below.  I reviewed the labs the patient had done earlier today including a CBC, comprehensive metabolic panel, ammonia level.  Given that these labs were done so recently, the only lab that was rechecked in the emergency department was the ammonia and it has increased from 120 earlier today up to 154 this evening.  UA will be checked as well.     Results for orders placed or performed during the hospital encounter of 12/04/20   Ammonia (on ice)     Status: Abnormal   Result Value Ref Range    Ammonia 154 (HH) 10 - 50 umol/L   Asymptomatic COVID-19 Virus (Coronavirus) by PCR     Status: None    Specimen: Nasopharyngeal   Result Value Ref Range    COVID-19 Virus PCR to [facility] - Source Nasopharyngeal     COVID-19 Virus PCR to [facility] - Result       Test received-See reflex to IDDL  test SARS CoV2 (COVID-19) Virus RT-PCR     Medications   lactulose (CHRONULAC) solution 20 g (20 g Oral Given 12/4/20 2330)   ondansetron (ZOFRAN) injection 8 mg (8 mg Intravenous Given 12/4/20 2330)        Assessments & Plan (with Medical Decision Making)   Patient presents with some confusion, increased sleepiness in the setting of known cirrhosis due to CUTEO.  Symptoms are similar to late last month when she was hospitalized for hepatic encephalopathy.  Ammonia level is elevated today.  Patient was given lactulose in the ED and I will plan for admission for hepatic encephalopathy.  Given that she had CT scan of the head during her last admission and symptoms were similar with no acute findings on CT, I do not think that she needs repeat head CT today.    I have reviewed the nursing notes. I have reviewed the findings, diagnosis, plan and need for follow up with the patient.    New Prescriptions    No medications on file       Final diagnoses:   Liver cirrhosis secondary to CUETO (H)   Hepatic encephalopathy (H)     I, Bette Thompson, am serving as a trained medical scribe to document services personally performed by Onelia iPres MD, based on the provider's statements to me.      I, Onelia Pires MD, was physically present and have reviewed and verified the accuracy of this note documented by Bette Thompson.     --  Onelia Pires MD  [facility] EMERGENCY DEPARTMENT  12/4/2020     Onelia Pires MD  12/05/20 0052

## 2020-12-05 NOTE — TELEPHONE ENCOUNTER
Susan, called to state that she is brain foggy. She is had 2 bowel movements today. She has only taken her lactulose twice today. She does take it 3 times during the day. Susan is concerned that it seems as her fogginess is starting to come more often.  She does have increasing fatigue. She did she her PCP today. Ammonia level was drawn but not resulted per patient. Hgb stable and improved from previous draw. No blood in stool. Patient reports that food does make her nauseated. Patient's PCP did give her a script for glucerna for her to try taking during the day to help with nutrition.   Susan to take her lactulose dose and if still foggy and no stool results patient to call back.

## 2020-12-05 NOTE — PLAN OF CARE
"Pt admitted from ED at 0330.  Per ED MD note:  Admitted for fatigue. Patient has a history of liver cirrhosis 2/2 CUETO c/b hepatic encephalopathy, type 2 diabetes mellitus. Patient reports feeling \"foggy\" and exhausted today. She states she is having trouble keeping her eyes open.    AVSS.  A&Ox4.  Pt states she feels lethargic, able to answer questions appropriately.   Ammonia=154 in ED.  Up with assist 1, falls precautions.  Pt with gen weakness.  Denies pain.  Complains of HA - Imitrex x1.  Cont to monitor closely.  Skin intact.  Pt has L PIV and L arm diabetic sensor.  Voiding, no difficulties.  Loose stools per pt - 2 in ED and 1 this shift.  Poor appetite, per pt.  Pt requested courtesy meal - ate 50%.  Cont with POC.  "

## 2020-12-05 NOTE — ED NOTES
[facility]    ED Nurse to Floor Handoff     Susan Puckett is a 50 year old female who speaks English and lives with family members,  in a home  They arrived in the ED by car from home    ED Chief Complaint: Fatigue    ED Dx;   Final diagnoses:   Liver cirrhosis secondary to CUETO (H)   Hepatic encephalopathy (H)         Needed?: No    Allergies:   Allergies   Allergen Reactions     Prednisone      Nsaids Other (See Comments)   .  Past Medical Hx:   Past Medical History:   Diagnosis Date     Depressive disorder      Diabetes (H)     Type 2 DM/No Insulin      History of blood transfusion     10/2019     Liver cirrhosis secondary to CUETO (H) 05/28/2020     Thyroid disease       Baseline Mental status: WDL  Current Mental Status changes: at basesline    Infection present or suspected this encounter: no  Sepsis suspected: No  Isolation type: No active isolations  Patient tested for COVID 19 prior to admission: NO     Activity level - Baseline/Home:  Independent  Activity Level - Current:   Independent    Bariatric equipment needed?: No    In the ED these meds were given:   Medications   lactulose (CHRONULAC) solution 20 g (20 g Oral Given 12/4/20 2330)   ondansetron (ZOFRAN) injection 8 mg (8 mg Intravenous Given 12/4/20 2330)       Drips running?  No    Home pump  No    Current LDAs  Peripheral IV 12/04/20 Left Upper arm (Active)   Site Assessment WDL 12/04/20 2324   Number of days: 0       Labs results:   Labs Ordered and Resulted from Time of ED Arrival Up to the Time of Departure from the ED   AMMONIA   COVID-19 VIRUS (CORONAVIRUS) BY PCR       Imaging Studies: No results found for this or any previous visit (from the past 24 hour(s)).    Recent vital signs:   /75   Pulse 77   Temp 98  F (36.7  C) (Oral)   Resp 16   SpO2 100%     [place] Coma Scale Score: 15 (12/04/20 2337)       Cardiac Rhythm: Normal Sinus  Pt needs tele? No  Skin/wound Issues:  None    Code Status: Full Code    Pain control: pt had none    Nausea control: pt had none    Abnormal labs/tests/findings requiring intervention:     Family present during ED course? No   Family Comments/Social Situation comments:     Tasks needing completion: None    Onelia Dowell RN  [facility] --   [phone] [facility]  [phone] [facility]

## 2020-12-05 NOTE — TELEPHONE ENCOUNTER
Spoke with patient she is very tearful that her ammonia is high and she has some fogginess, weakness and fatigue is worse.  In care everywhere her ammonia is 122.    She feels she drinks 60 ounces of water. She is taking her lactulose 3 times  A day. She did have 3 BMs today. One BM after each dose of lactulose. She does not have an appetite.      Her  is currently at work and he will bring her the ED.

## 2020-12-05 NOTE — PROGRESS NOTES
Admitted/transferred from:  [facility] ED  2 RN full   skin assessment completed by Alexandra Barcenas, RN and Quinn Pantoja RN.  Skin assessment finding: skin intact, no problems   Interventions/actions: other :  Skin intact.  Pt has L PIV and L arm diabetic sensor.     Will continue to monitor.

## 2020-12-05 NOTE — H&P
[facility]     History and Physical - [facility] Night Team         Date of Admission:  12/4/2020    Assessment & Plan   Susan Puckett is a 50 year old female with hx including cirrhosis 2/2 CUETO c/b hepatic encephalopathy, type 2 DM, depression, GERD, hypothyroidism, acute on chronic anemia, s/p Faustino En Y Gastric Bypass who presents with 10 days of progressive fatigue and re-onset of confusion following recent admission for confusion 2/2 hepatic encephalopathy.      #Acute Encephalopathy  #Liver Cirrhosis 2/2 CUETO c/b hepatic encephalopathy  History and elevated ammonia consistent with hepatic encephalopathy as cause of acute encephalopathy.  Suspect sub optimal ammonia clearance due to inadequate lactulose.  Low concern for infectious cause of HE without fever, WBC wnl, and bland ROS.  Headache unlikely 2/2 to infection in setting of chronic headaches.  Will rule out thrombosis with RUQ US and doppler.  Plan to up titrate lactulose with continuation of rifaximin.    - [facility] protocol ordered  - Continue PTA Rifaximin 550mg BID  - RUQ US with doppler  - Thiamine and Folate  - Continue PTA Furosemide and Spironolactone  - Awaiting liver transplant, follows with Dr. Cook    #Acute on Chronic Macrocytic Anemia  Baseline Hgb 11-12; was 9.5 at recent discharge.  Hgb 10.9 today.  No sign of acute bleed.    - Continue PTA ferrous sulfate    #Hypoalbuminemia   #Hyponatremia  Likely 2/2 to known cirrhosis.    - BMP daily    Other Chronic Conditions:  #Type 2 DM- Holding oral agent; low-dose sliding scale ordered  #Vitamin D deficiency: Oral Vitamin D2 01014 units once per week (takes on Wednesday); not ordered, give if still in hospital come Wednesday    Remaining PTA medications continued unless otherwise stated above.       Diet:  Carb consistent  Fluids: None  DVT Prophylaxis: Enoxaparin  Rossi Catheter: not present  Code Status:  Full-code    Patient was discussed with  senior resident Jarad Nelson MD.  Patient to be formally staffed in the AM.     Jerald Cleary DO, MPH  [facility] Med-Peds PGY-1  Pager- [phone]  ______________________________________________________________________    Chief Complaint   Fatigue and worsening confusion    History of Present Illness   Susan Puckett is a 50 year old female with hx including cirrhosis 2/2 CUETO c/b hepatic encephalopathy, type 2 DM, depression, GERD, hypothyroidism, acute on chronic anemia, s/p Faustino En Y Gastric Bypass who presents with fatigue and confusion.  Patient was recently admitted for fatigue and confusion thought to be secondary to hepatic encephalopathy from 11/22-11/25.  Since discharge, patient reports fatigue requiring her to sleep often throughout the day; this fatigue has progressed over time.  She reports experiencing confusion once again on the day of presentation (12/4).  She reports compliance with all of her prescribed medications including her Rifaximin and Lactulose taking 30ml TID with the result of three soft bowel movements per day.  Denies any recent fever, chills.  Does report frequent headache over the four days requiring multiple doses of her sumatriptan. Also reports very mild generalized lower abdominal pain.      She was seen by her PCP on 12/4.  Labs at her PCP the day of presentation included ammonia of 120.  BMP remarkable for Na low 134, Cl low 97, albumin low 3.1, glucose H 134.  CBC remarkable for hgb low 10.9, .2, plt 112, retic count 2.9%. Vit B12 >2,000, folate wnl, INR slight H 1.2. Patient discussed with liver coordinator, who recommended evaluation in the ED.      In the ED, /62, HR 80, afebrile, RR 16, SpO2 100%.  Recheck ammonia of 154 up from 120 earlier today.   UA remarkable for elevated hyaline casts.  Given 20g oral lactulose, zofran, and admitted to general medicine for further evaluation and management.       Review of Systems    The 10 point Review of Systems is negative  other than noted in the HPI or here.    Past Medical History      Past Medical History:   Diagnosis Date     Depressive disorder      Diabetes (H)     Type 2 DM/No Insulin      History of blood transfusion     10/2019     Liver cirrhosis secondary to CUETO (H) 05/28/2020     Thyroid disease        Past Surgical History     Past Surgical History:   Procedure Laterality Date     APPENDECTOMY      1989     COLONOSCOPY      1/2020 at [facility]      ENT SURGERY       GI SURGERY      EGD August 2020 at [facility]      GYN SURGERY      2010     RNY gastric bypass  01/2006    retoocolic, retrogastric, [facility]     VASCULAR SURGERY         Social History   Denies tobacco, alcohol, or illicit drug use.  Lives at home with .      Family History   Non-contributory    Prior to Admission Medications   Prior to Admission Medications   Prescriptions Last Dose Informant Patient Reported? Taking?   Ferrous Sulfate (IRON) 325 (65 FE) MG tablet   Yes No   Sig: Take 1 tablet by mouth daily   SUMAtriptan (IMITREX) 50 MG tablet   Yes No   Sig: Take 50 mg by mouth at onset of headache for migraine    Vitamin K, Phytonadione, 100 MCG TABS   Yes No   Sig: Take 100 mcg by mouth daily    atorvastatin (LIPITOR) 20 MG tablet   Yes No   Sig: Take 20 mg by mouth daily    calcium carbonate (TUMS) 500 MG chewable tablet   Yes No   Sig: Take 1 tablet by mouth daily as needed for heartburn    calcium citrate-vitamin D (CITRACAL W/D) 250-100 MG-UNIT tablet   No No   Sig: Take 2 tablets by mouth 2 times daily (with meals)   cholecalciferol (VITAMIN D-1000 MAX ST) 25 MCG (1000 UT) TABS   Yes No   Sig: Take 4,000 Units by mouth   cyanocobalamin (VITAMIN B-12) 1000 MCG tablet   Yes No   Sig: Take 1,000 mcg by mouth daily    folic acid (FOLVITE) 1 MG tablet   Yes No   Sig: Take 1 mg by mouth daily    furosemide (LASIX) 40 MG tablet   Yes No   Sig: Take 40 mg by mouth 2 times daily    lactulose (CHRONULAC) 10 GM/15ML solution   Yes No   Sig:  Take 20 g by mouth 2 times daily    levothyroxine (SYNTHROID) 175 MCG tablet   Yes No   Sig: Take 175 mcg by mouth every day except for Monday and Thursday   multivitamin (DEKAS ESSENTIAL) capsule   Yes No   Sig: Take 1 capsule by mouth daily    omeprazole (PRILOSEC) 20 MG DR capsule   No No   Sig: Take 1 capsule (20 mg) by mouth daily   ondansetron (ZOFRAN-ODT) 4 MG ODT tab   Yes No   Sig: Place 4 mg under the tongue every 6 hours as needed for nausea    rifaximin (XIFAXAN) 550 MG TABS tablet   Yes No   Sig: Take 550 mg by mouth 2 times daily    sitagliptin (JANUVIA) 100 MG tablet   Yes No   Sig: Take 1 tablet (100 mg) by mouth daily   spironolactone (ALDACTONE) 100 MG tablet   Yes No   Sig: Take 200 mg by mouth daily    traZODone (DESYREL) 100 MG tablet   Yes No   Sig: Take  mg by mouth nightly as needed for sleep   valACYclovir (VALTREX) 1000 mg tablet   Yes No   Sig: Take 1,000 mg by mouth daily as needed (at onset of cold sore)    venlafaxine (EFFEXOR) 75 MG tablet   No No   Sig: Take 1 tablet (75 mg) by mouth At Bedtime   vitamin A 3 MG (06735 UNITS) capsule   Yes No   Sig: Take 10,000 Units by mouth daily    vitamin C (ASCORBIC ACID) 100 MG tablet   Yes No   Sig: Take 100 mg by mouth daily    vitamin D2 (ERGOCALCIFEROL) 92242 units (1250 mcg) capsule   No No   Sig: Take 1 capsule (50,000 Units) by mouth once a week for 10 doses   vitamin E (TOCOPHEROL) 400 units (180 mg) capsule   Yes No   Sig: Take 400 Units by mouth daily       Facility-Administered Medications: None     Allergies   Allergies   Allergen Reactions     Prednisone      Nsaids Other (See Comments)       Physical Exam   Vital Signs: Temp: 98  F (36.7  C) Temp src: Oral BP: 109/75 Pulse: 77   Resp: 16 SpO2: 100 % O2 Device: None (Room air)    Weight: 0 lbs 0 oz    Constitutional: Tired-appearing, stayed awake during encounter, answers questions appropriately, no signs of significant confusion  Eyes: Lids and lashes normal, pupils equal,  round and reactive to light, extra ocular muscles intact, sclera clear, conjunctiva normal   HEENT: Normocephalic, atraumatic, moist mucous membranes  Hematologic / Lymphatic: no cervical lymphadenopathy  Respiratory: No increased work of breathing, good air exchange, clear to auscultation bilaterally, no crackles or wheezing  Cardiovascular: RRR, no murmur, no gallop, no rub  GI: No scars, normal bowel sounds, soft, non-distended, non-tender, no masses palpated, no hepatosplenomegally  Skin: No rash  Musculoskeletal: 5/5 strength bilateral upper and lower extremities  Neurologic: No focal neurologic deficits  Neuropsychiatric: No bizarre behavior     Data   Data reviewed today: I reviewed all recent labs and images.

## 2020-12-06 LAB
ALBUMIN UR-MCNC: NEGATIVE MG/DL
APPEARANCE UR: CLEAR
BILIRUB UR QL STRIP: NEGATIVE
COLOR UR AUTO: ABNORMAL
GLUCOSE BLDC GLUCOMTR-MCNC: 122 MG/DL (ref 70–99)
GLUCOSE BLDC GLUCOMTR-MCNC: 187 MG/DL (ref 70–99)
GLUCOSE BLDC GLUCOMTR-MCNC: 188 MG/DL (ref 70–99)
GLUCOSE BLDC GLUCOMTR-MCNC: 208 MG/DL (ref 70–99)
GLUCOSE UR STRIP-MCNC: NEGATIVE MG/DL
HGB UR QL STRIP: NEGATIVE
HYALINE CASTS #/AREA URNS LPF: 4 /LPF (ref 0–2)
KETONES UR STRIP-MCNC: NEGATIVE MG/DL
LEUKOCYTE ESTERASE UR QL STRIP: NEGATIVE
LIPASE SERPL-CCNC: 14 U/L (ref 73–393)
MUCOUS THREADS #/AREA URNS LPF: PRESENT /LPF
NITRATE UR QL: NEGATIVE
PH UR STRIP: 6.5 PH (ref 5–7)
RBC #/AREA URNS AUTO: 1 /HPF (ref 0–2)
SOURCE: ABNORMAL
SP GR UR STRIP: 1 (ref 1–1.03)
SQUAMOUS #/AREA URNS AUTO: 2 /HPF (ref 0–1)
UROBILINOGEN UR STRIP-MCNC: NORMAL MG/DL (ref 0–2)
WBC #/AREA URNS AUTO: <1 /HPF (ref 0–5)

## 2020-12-06 PROCEDURE — 250N000013 HC RX MED GY IP 250 OP 250 PS 637: Performed by: HOSPITALIST

## 2020-12-06 PROCEDURE — 81001 URINALYSIS AUTO W/SCOPE: CPT | Performed by: STUDENT IN AN ORGANIZED HEALTH CARE EDUCATION/TRAINING PROGRAM

## 2020-12-06 PROCEDURE — 250N000013 HC RX MED GY IP 250 OP 250 PS 637: Performed by: STUDENT IN AN ORGANIZED HEALTH CARE EDUCATION/TRAINING PROGRAM

## 2020-12-06 PROCEDURE — 99233 SBSQ HOSP IP/OBS HIGH 50: CPT | Mod: GC | Performed by: STUDENT IN AN ORGANIZED HEALTH CARE EDUCATION/TRAINING PROGRAM

## 2020-12-06 PROCEDURE — 999N001017 HC STATISTIC GLUCOSE BY METER IP

## 2020-12-06 PROCEDURE — 250N000011 HC RX IP 250 OP 636: Performed by: HOSPITALIST

## 2020-12-06 PROCEDURE — 999N000128 HC STATISTIC PERIPHERAL IV START W/O US GUIDANCE

## 2020-12-06 PROCEDURE — 36415 COLL VENOUS BLD VENIPUNCTURE: CPT | Performed by: STUDENT IN AN ORGANIZED HEALTH CARE EDUCATION/TRAINING PROGRAM

## 2020-12-06 PROCEDURE — 120N000002 HC R&B MED SURG/OB UMMC

## 2020-12-06 PROCEDURE — 83690 ASSAY OF LIPASE: CPT | Performed by: STUDENT IN AN ORGANIZED HEALTH CARE EDUCATION/TRAINING PROGRAM

## 2020-12-06 RX ORDER — SIMETHICONE 80 MG
80 TABLET,CHEWABLE ORAL EVERY 6 HOURS PRN
Status: DISCONTINUED | OUTPATIENT
Start: 2020-12-06 | End: 2020-12-08 | Stop reason: HOSPADM

## 2020-12-06 RX ORDER — PROCHLORPERAZINE MALEATE 5 MG
5 TABLET ORAL EVERY 6 HOURS PRN
Status: DISCONTINUED | OUTPATIENT
Start: 2020-12-06 | End: 2020-12-07

## 2020-12-06 RX ORDER — ONDANSETRON 4 MG/1
4 TABLET, ORALLY DISINTEGRATING ORAL EVERY 8 HOURS PRN
Qty: 15 TABLET | Refills: 0 | Status: CANCELLED | OUTPATIENT
Start: 2020-12-06

## 2020-12-06 RX ORDER — LACTULOSE 10 G/15ML
30 SOLUTION ORAL 2 TIMES DAILY
Qty: 1892 ML | Refills: 0 | Status: CANCELLED | OUTPATIENT
Start: 2020-12-06

## 2020-12-06 RX ORDER — LACTULOSE 10 G/10G
10 SOLUTION ORAL ONCE
Status: DISCONTINUED | OUTPATIENT
Start: 2020-12-06 | End: 2020-12-08 | Stop reason: HOSPADM

## 2020-12-06 RX ADMIN — ONDANSETRON 4 MG: 4 TABLET, ORALLY DISINTEGRATING ORAL at 00:12

## 2020-12-06 RX ADMIN — CYANOCOBALAMIN TAB 1000 MCG 1000 MCG: 1000 TAB at 08:08

## 2020-12-06 RX ADMIN — Medication 10000 UNITS: at 09:38

## 2020-12-06 RX ADMIN — INSULIN ASPART 1 UNITS: 100 INJECTION, SOLUTION INTRAVENOUS; SUBCUTANEOUS at 17:13

## 2020-12-06 RX ADMIN — INSULIN ASPART 1 UNITS: 100 INJECTION, SOLUTION INTRAVENOUS; SUBCUTANEOUS at 11:35

## 2020-12-06 RX ADMIN — SPIRONOLACTONE 200 MG: 100 TABLET, FILM COATED ORAL at 09:38

## 2020-12-06 RX ADMIN — ONDANSETRON 4 MG: 4 TABLET, ORALLY DISINTEGRATING ORAL at 19:15

## 2020-12-06 RX ADMIN — ONDANSETRON 4 MG: 4 TABLET, ORALLY DISINTEGRATING ORAL at 09:32

## 2020-12-06 RX ADMIN — FUROSEMIDE 40 MG: 20 TABLET ORAL at 08:09

## 2020-12-06 RX ADMIN — POLYETHYLENE GLYCOL 3350 17 G: 17 POWDER, FOR SOLUTION ORAL at 08:08

## 2020-12-06 RX ADMIN — Medication 2 TABLET: at 17:14

## 2020-12-06 RX ADMIN — RIFAXIMIN 550 MG: 550 TABLET ORAL at 20:15

## 2020-12-06 RX ADMIN — LACTULOSE 30 G: 20 POWDER, FOR SOLUTION ORAL at 20:15

## 2020-12-06 RX ADMIN — PROCHLORPERAZINE MALEATE 5 MG: 5 TABLET ORAL at 12:54

## 2020-12-06 RX ADMIN — LACTULOSE 30 G: 20 POWDER, FOR SOLUTION ORAL at 13:34

## 2020-12-06 RX ADMIN — Medication 4000 UNITS: at 08:08

## 2020-12-06 RX ADMIN — LACTULOSE 20 G: 20 POWDER, FOR SOLUTION ORAL at 08:08

## 2020-12-06 RX ADMIN — THIAMINE HCL TAB 100 MG 100 MG: 100 TAB at 08:09

## 2020-12-06 RX ADMIN — FUROSEMIDE 40 MG: 20 TABLET ORAL at 17:01

## 2020-12-06 RX ADMIN — ENOXAPARIN SODIUM 40 MG: 40 INJECTION SUBCUTANEOUS at 08:09

## 2020-12-06 RX ADMIN — RIFAXIMIN 550 MG: 550 TABLET ORAL at 08:08

## 2020-12-06 RX ADMIN — FERROUS SULFATE TAB 325 MG (65 MG ELEMENTAL FE) 325 MG: 325 (65 FE) TAB at 08:09

## 2020-12-06 RX ADMIN — ATORVASTATIN CALCIUM 20 MG: 20 TABLET, FILM COATED ORAL at 08:09

## 2020-12-06 RX ADMIN — Medication 0.1 MG: at 08:09

## 2020-12-06 RX ADMIN — OMEPRAZOLE 20 MG: 20 CAPSULE, DELAYED RELEASE ORAL at 08:09

## 2020-12-06 RX ADMIN — FOLIC ACID 1 MG: 1 TABLET ORAL at 08:09

## 2020-12-06 RX ADMIN — VITAMIN E CAP 400 UNIT 400 UNITS: 400 CAP at 09:39

## 2020-12-06 RX ADMIN — VENLAFAXINE 75 MG: 75 TABLET ORAL at 22:35

## 2020-12-06 RX ADMIN — SUMATRIPTAN SUCCINATE 50 MG: 50 TABLET ORAL at 03:03

## 2020-12-06 ASSESSMENT — MIFFLIN-ST. JEOR: SCORE: 1224.75

## 2020-12-06 ASSESSMENT — ACTIVITIES OF DAILY LIVING (ADL)
ADLS_ACUITY_SCORE: 20
ADLS_ACUITY_SCORE: 17
ADLS_ACUITY_SCORE: 19
ADLS_ACUITY_SCORE: 20

## 2020-12-06 NOTE — PROGRESS NOTES
Resident/Fellow Attestation   I, Alfredo Masterson, was present with the medical student who participated in the service and in the documentation of the note.  I have verified the history and personally performed the physical exam and medical decision making.  I agree with the assessment and plan of care as documented in the note.      Key findings: 49yo F with cirrhosis 2/2 CUETO c/b hepatic encephalopathy presenting with confusion/weakness in the setting of recent admission.    S: Feels weak though feels less confused. Fatigue. Thinking clearly. Had BM x1, continuing to improve eating.    O: Vitals stable.   Exam: Alert & oriented x4, appropriate affect, no scleral icterus, nonjaundiced. abd soft nontender nondistended  Imaging reviewed (RUQ abd us unremarkable)    A/P:  Encephalopathy: Likely hepatic encephalopathy, not rechecking ammonia  -increased lactulose 20 tid-->30 tid    Weakness: Likely cirrhosis/malnutrition  -PT/OT    Dispo: Home  -Plan for discharge 12/7 if continuing to improve mentation with good follow-up    Alfredo Masterson MD  Resident Physician, PGY-1  Internal Medicine-Dermatology  Pager: [phone]  Date of Service (when I saw the patient): 12/06/20    [facility]     Progress Note - [facility] 1 Service        Date of Admission:  12/4/2020    Assessment & Plan       Susan Puckett is a 50 year old female admitted on 12/4/2020. h/o cirrhosis 2/2 CUETO c/b hepatic encephalopathy, type 2 DM, acute on chronic anemia, s/p Faustino En Y Gastric Bypass (2006), GERD, hypothyroidism, and depression who presents with 10 days of progressive fatigue and re-onset of confusion following recent admission for confusion due to hepatic encephalopathy.    #Acute Encephalopathy  #Liver Cirrhosis 2/2 CUETO c/b hepatic encephalopathy  History consistent with hepatic encephalopathy. Possible suboptimal ammonia clearance due to inadequate lactulose dosage. Patient also reports eating  large amounts of chicken. Low concern for infectious cause or acute bleed. Plan to up titrate lactulose with continuation of rifaximin.    - [facility] protocol   - Continue PTA Rifaximin 550mg BID  - Lactulose  - Thiamine and Folate  - Continue PTA Furosemide and Spironolactone  - Awaiting liver transplant, follows with Dr. Cook     #Acute on Chronic Macrocytic Anemia  Baseline Hgb 11-12; was 9.5 at recent discharge.  Hgb 10.0 today. No sign of acute bleed.    - Continue PTA ferrous sulfate  - Continue B12     #Hypoalbuminemia   #Hyponatremia  #Weakness  Likely 2/2 to known cirrhosis and poor nutrition in the setting of poor liver function vs. deconditioning. Hyponatremia resolved.  - BMP daily  - PT/OT     #Type 2 DM  Outpatient diagnosis, recently switched from metformin to Sitagliptin.  - low-dose sliding scale insulin  - gluc checks       Diet: Moderate Consistent CHO Diet    Fluids: PO intake  Lines: Peripheral IV  DVT Prophylaxis: Enoxaparin (Lovenox) SQ  Rossi Catheter: not present  Code Status: Full Code           Disposition Plan   Expected discharge: 1 - 2 days, recommended to prior living arrangement once adequate pain management/ tolerating PO medications.  Entered: Clayton Victor 12/06/2020, 8:27 AM       The patient's care was discussed with the Attending Physician, Dr. Freedman.    Clayton Victor  Medical Student  [address]   Please see sign in/sign out for up to date coverage information  ______________________________________________________________________    Interval History   No acute events overnight. Feels fatigued and generally weak today, but denies any confusion. Has had some nausea, managed well with Zofran. Denies any blood in stool urine or vomit. Denies any fever or chills.    Data reviewed today: I reviewed all medications, new labs and imaging results over the last 24 hours  Physical Exam   Vital Signs: Temp: 98.6  F (37  C)  Temp src: Oral BP: 123/85 Pulse: 95   Resp: 18 SpO2: 98 % O2 Device: None (Room air)    Weight: 127 lbs 6.81 oz  Constitutional: Tired-appearing, answers questions appropriately. Able to follow conversation and interview questions well. Laying in bed.  Eyes: PEERLA  HEENT: Normocephalic, atraumatic, moist mucous membranes  Hematologic / Lymphatic: no cervical lymphadenopathy  Respiratory: No increased work of breathing, good air exchange, clear to auscultation bilaterally, no crackles or wheezing  Cardiovascular: RRR, no murmur, no gallop, no rub  GI: No scars, normal bowel sounds, soft, non-distended, non-tender, no masses palpated, no hepatosplenomegally  Skin: No rash  Musculoskeletal: 5/5 strength bilateral upper and lower extremities  Neurologic: No focal neurologic deficits  Neuropsychiatric: No bizarre behavior     Data   Recent Labs   Lab 12/05/20  0548   WBC 4.1   HGB 10.0*   *   *      POTASSIUM 3.8  3.8   CHLORIDE 100   CO2 29   BUN 16   CR 1.05*   ANIONGAP 6   MAURI 8.9   *   ALBUMIN 2.6*   PROTTOTAL 6.9   BILITOTAL 0.7   ALKPHOS 136   ALT 28   AST 35

## 2020-12-06 NOTE — PLAN OF CARE
"/80 (BP Location: Right arm)   Pulse 96   Temp 98.6  F (37  C) (Oral)   Resp 18   Ht 1.676 m (5' 6\")   Wt 57.8 kg (127 lb 6.8 oz)   SpO2 97%   BMI 20.57 kg/m    PIV is hard to infusing and new ordering new PIV placement. Patient is A & O x 4. No c/o pain but patient continue to c/o nausea, dry hives and received Zofran 4 mg po x 1, compazine 5 mg po x 1 with some relief. Blood glucose check with sliding scale correction. Patient is eating and drinking fair. Patient showered and bed sheets were changed. Patient needs UA to be collect and send. Continue with plan of care and notify MD for status changes.    Problem: Adult Inpatient Plan of Care  Goal: Plan of Care Review  Outcome: No Change  Flowsheets (Taken 12/6/2020 1320)  Plan of Care Reviewed With: patient     Problem: Adult Inpatient Plan of Care  Goal: Absence of Hospital-Acquired Illness or Injury  Intervention: Identify and Manage Fall Risk  Recent Flowsheet Documentation  Taken 12/6/2020 0801 by Karie Pittman RN  Safety Promotion/Fall Prevention:   fall prevention program maintained   nonskid shoes/slippers when out of bed     Problem: Adult Inpatient Plan of Care  Goal: Absence of Hospital-Acquired Illness or Injury  Intervention: Prevent Skin Injury  Recent Flowsheet Documentation  Taken 12/6/2020 0801 by Karie Pittman RN  Body Position: position changed independently     Problem: Adult Inpatient Plan of Care  Goal: Absence of Hospital-Acquired Illness or Injury  Intervention: Prevent and Manage VTE (Venous Thromboembolism) Risk  Recent Flowsheet Documentation  Taken 12/6/2020 0801 by Karie Pittman RN  VTE Prevention/Management:   ambulation promoted   bleeding risk assessed     Problem: Fatigue  Goal: Improved Activity Tolerance  Intervention: Promote Energy Conservation  Recent Flowsheet Documentation  Taken 12/6/2020 0801 by Karie Pittman RN  Activity Management:   activity adjusted per tolerance   " activity encouraged

## 2020-12-06 NOTE — DISCHARGE SUMMARY
[facility]   Discharge Summary - Medicine & Pediatrics       Date of Admission:  12/4/2020  Date of Discharge:  12/8/2020  Discharging Provider: Alfredo Masterson MD/Tiffani Montero DO  Discharge Service: Randi 1    Discharge Diagnoses   #Acute Encephalopathy  #Liver Cirrhosis  #Non-alcoholic steatohepatitis  #Constipation  #Abdominal Pain  #Hepatic encephalopathy  #Acute on Chronic Macrocytic Anemia  #Hypoalbuminemia   #Hyponatremia  #Type 2 Diabetes Mellitus  #Vitamin D deficiency  #History of nurys-en-y bypass  #Depression    Follow-ups Needed After Discharge   PCP:   -follow up BMP in 1 week, consider restarting lasix if BMP stable   -continue to monitor stool output    Unresulted Labs Ordered in the Past 30 Days of this Admission     No orders found from 11/4/2020 to 12/5/2020.      These results will be followed up by N/A.    Discharge Disposition   Discharged to home  Condition at discharge: Stable    Hospital Course   Susan Puckett is a 50-year-old woman with cirrhosis 2/2 CUETO c/b hepatic encephalopathy, type 2 DM, depression, GERD, hypothyroidism, acute on chronic anemia, and a history of Nurys En Y Gastric Bypass who presents with 10 days of progressive fatigue and re-onset of confusion following recent admission for confusion 2/2 hepatic encephalopathy and is admitted on 12/4/20.  The following problems were addressed during her hospitalization:     #Acute Encephalopathy  #Liver Cirrhosis  #Non-alcoholic steatohepatitis  #Hepatic encephalopathy  #Abdominal pain  #Constipation  Ammonia 154 on admission (prior admission ammonia 122) with confusion and weakness. Thought to be due to inadequate lactulose. Low concern for infectious etiology, RUQ US with doppler unremarkable. Uptitrated lactulose and continued rifaximin with improvement on day of discharge.    Continue lactulose 30mg six times daily (discussed titration to 3-4 loose/soft stools daily)    PRN ondansetron  q8hr on discharge    For other medical issues (Acute on Chronic Macrocytic Anemia, Hypoalbuminemia, Hyponatremia, Type 2 Diabetes Mellitus, Vitamin D deficiency, History of nurys-en-y bypass, Depression) home/hospital therapies were continued or initiated that were not significant to the patient's hospitalization.     Consultations This Hospital Stay   PHYSICAL THERAPY ADULT IP CONSULT  OCCUPATIONAL THERAPY ADULT IP CONSULT  VASCULAR ACCESS CARE ADULT IP CONSULT    Code Status   Full Code     The patient was discussed with Dr. Tiffani Montero.    MD Randi Hernandez [address] [place] [address] [address] [address]  Phone: [phone]  ______________________________________________________________________    Physical Exam   Vital Signs: Temp: 96.2  F (35.7  C) Temp src: Oral BP: 131/88 Pulse: 69   Resp: 16 SpO2: 100 % O2 Device: None (Room air)    Weight: 127 lbs 6.81 oz    General: tired-appearing woman in NAD  HEENT: Anicteric sclerae  Respiratory: Normal respiratory effort  Abdomen/Back: Nondistended, mild TTP in abdomen, improved  Musculoskeletal: Moving extremities well.  Neuro: Alert and oriented x3. CN 2-12 grossly intact.  Skin: Not jaundiced.      Primary Care Physician   Harleen Billy    Discharge Orders      Home care nursing referral      Reason for your hospital stay    You were seen for confusion and fatigue in the hospital, likely due to higher ammonia levels in your blood. We performed diagnostics and treated you with medications to reduce your ammonia levels. With improvement, you were discharged home.     Follow Up and recommended labs and tests    Follow up with primary care provider, Harleen Billy, within 1-2 weeks, for hospital follow-up.     Follow up with GI doctors for possible esophagram. (scheduled appointment 12/9)     Activity    Your activity upon discharge: activity as tolerated     Discharge Instructions    -please take lactulose 30mg six  times daily (keep taking until you are having 3-4 loose stools daily, you can hold doses as needed when you have achieved this)  -take prochlorperazine (compazine) as needed for nausea  -follow up with your liver doctor within 2-3 weeks (and JORGE Roberto, in the next 7 days)     Full Code     Diet    Follow this diet upon discharge: Orders Placed This Encounter      Moderate Consistent CHO Diet (recommend low protein)       Significant Results and Procedures   Most Recent 3 CBC's:  Recent Labs   Lab Test 12/08/20  0647 12/07/20  0642 12/05/20  0548 11/25/20  0639   WBC 3.8*  --  4.1 4.3   HGB 10.7*  --  10.0* 9.5*   *  --  108* 106*   * 99* 104* 100*     Most Recent 3 BMP's:  Recent Labs   Lab Test 12/08/20  0647 12/07/20  0642 12/05/20  0548    136 135   POTASSIUM 3.9 4.0 3.8  3.8   CHLORIDE 102 102 100   CO2 25 26 29   BUN 9 15 16   CR 0.99 1.18* 1.05*   ANIONGAP 8 8 6   MAURI 9.1 8.8 8.9   * 127* 231*     Most Recent 2 LFT's:  Recent Labs   Lab Test 12/08/20  0647 12/05/20  0548   AST 36 35   ALT 29 28   ALKPHOS 146 136   BILITOTAL 0.8 0.7     Most Recent 3 INR's:  Recent Labs   Lab Test 11/25/20  0639 11/22/20  1406 10/26/20  0916   INR 1.23* 1.17* 1.23*   ,   Results for orders placed or performed during the hospital encounter of 12/04/20   US Abd/Pelvis Duplex Complete Portable    Narrative    EXAMINATION: Ultrasound abdomen and Doppler only 12/5/2020     COMPARISON: None.    HISTORY: Check for ascites, Doppler of the right upper quadrant to  rule out hepatic thrombus    TECHNIQUE: Doppler evaluation of the abdomen was scanned in standard  fashion with specialized ultrasound transducer(s) using both  gray-scale, color Doppler, and spectral flow techniques.    Findings: Main portal vein measures 1.4 cm.  Extrahepatic portal vein flow is antegrade at 20 cm/s.  Right portal vein flow is antegrade, measuring 16 cm/s.  Left portal vein flow is antegrade, measuring 13 cm/s.    Flow in the  hepatic artery is towards the liver and:  58 cm/s peak systolic  0.62 resistive index.   Right hepatic artery is patent measuring 39 cm/s  Left hepatic artery is patent measuring 24 cm/s    The splenic vein is nonvisualized.  The left, middle, and right  hepatic veins are patent with flow towards the IVC. The IVC is patent  with flow towards the heart.   The visualized aorta is not dilated.    Fluid: Trace free fluid in the pelvis.      Impression    Impression:   1.  Normal Doppler ultrasound of the hepatic vessels.  2.  Trace free fluid in the pelvis.    I have personally reviewed the examination and initial interpretation  and I agree with the findings.    DEON MOSELEY MD   XR Chest Port 1 View    Narrative    XR CHEST PORT 1 VW  12/5/2020 12:35 PM      HISTORY: Toxic Metabolic Encephalopathy, evaluation for infection    COMPARISON: 11/22/2020    FINDINGS: AP chest radiograph. Trachea is midline, normal heart size.  No focal pulmonary opacity, pleural effusion, or pneumothorax.  Prominent ascending aortic contour. No acute osseous or upper  abdominal abnormality.      Impression    IMPRESSION: No acute cardiac pulmonary disease.    I have personally reviewed the examination and initial interpretation  and I agree with the findings.    DEON MOSELEY MD   CT Abdomen Pelvis w/o Contrast    Narrative    EXAMINATION: CT ABDOMEN PELVIS W/O CONTRAST, 12/7/2020 12:55 PM    TECHNIQUE:  Helical CT images from the lung bases through the  symphysis pubis were obtained without IV contrast. Contrast dose: None    COMPARISON: CT abdomen pelvis 7/22/2000 2018, MRI of the abdomen  12/5/2020    HISTORY: Abd pain, acute, generalized; LUQ Abdominal Pain, worsening  nausea    FINDINGS:  Abdomen and pelvis: Cirrhotic morphology of the liver, including  widening of the fissure adjacent to the falciform ligament. The  gallbladder is distended without calcified gallstones. Marked fatty  replacement of liver. The spleen measures up to  13.2 cm in  craniocaudal dimension. The adrenal glands are normal.    No renal stones, hydronephrosis, or significant perinephric stranding.  The urinary bladder is well distended without focal wall thickening.  No pelvic mass.    Postoperative changes of Faustino-en-Y gastric bypass. Oral contrast is  seen through the alimentary limb and reaching the proximal colon. The  pancreatic of biliary limb is nondilated. Grossly unremarkable small  bowel-small bowel anastomosis in the left upper quadrant. Moderate to  large amount of stool in colon.    No ascites, free air, or lymphadenopathy..    Lung bases: Clear    Bones and soft tissues: No significant degenerative change.      Impression    IMPRESSION:   1. No acute CT finding in the abdomen or pelvis.  2. Moderate to large amount of stool in the colon.  3. Cirrhotic morphology of the liver.  4. Postoperative changes of Faustino-en-Y gastric bypass. No dilated loops  of bowel.      GARRICK STEVE MD       Discharge Medications   Current Discharge Medication List      START taking these medications    Details   lactulose (CEPHULAC) 10 GM packet Take 3 packets (30 g) by mouth 6 times daily  Qty: 540 packet, Refills: 0    Associated Diagnoses: Liver cirrhosis secondary to CUETO (H)      prochlorperazine (COMPAZINE) 5 MG tablet Take 1 tablet (5 mg) by mouth every 6 hours as needed for nausea or vomiting  Qty: 8 tablet, Refills: 0    Associated Diagnoses: Liver cirrhosis secondary to CUETO (H)         CONTINUE these medications which have CHANGED    Details   levothyroxine (SYNTHROID/LEVOTHROID) 175 MCG tablet Take 1 tablet (175 mcg) by mouth daily  Qty: 30 tablet, Refills: 0    Associated Diagnoses: Primary hypothyroidism         CONTINUE these medications which have NOT CHANGED    Details   atorvastatin (LIPITOR) 20 MG tablet Take 20 mg by mouth daily       calcium carbonate (TUMS) 500 MG chewable tablet Take 1 tablet by mouth daily as needed for heartburn       calcium  citrate-vitamin D (CITRACAL W/D) 250-100 MG-UNIT tablet Take 2 tablets by mouth 2 times daily (with meals)  Qty: 336 tablet, Refills: 3    Associated Diagnoses: Osteopenia      cholecalciferol (VITAMIN D-1000 MAX ST) 25 MCG (1000 UT) TABS Take 4,000 Units by mouth      cyanocobalamin (VITAMIN B-12) 1000 MCG tablet Take 1,000 mcg by mouth daily       Ferrous Sulfate (IRON) 325 (65 FE) MG tablet Take 1 tablet by mouth daily      folic acid (FOLVITE) 1 MG tablet Take 1 mg by mouth daily       multivitamin (DEKAS ESSENTIAL) capsule Take 1 capsule by mouth daily       omeprazole (PRILOSEC) 20 MG DR capsule Take 1 capsule (20 mg) by mouth daily  Qty: 30 capsule, Refills: 0    Associated Diagnoses: Nausea      ondansetron (ZOFRAN-ODT) 4 MG ODT tab Place 4 mg under the tongue every 6 hours as needed for nausea       rifaximin (XIFAXAN) 550 MG TABS tablet Take 550 mg by mouth 2 times daily       sitagliptin (JANUVIA) 100 MG tablet Take 1 tablet (100 mg) by mouth daily  Qty:        spironolactone (ALDACTONE) 100 MG tablet Take 200 mg by mouth daily       SUMAtriptan (IMITREX) 50 MG tablet Take 50 mg by mouth at onset of headache for migraine       traZODone (DESYREL) 100 MG tablet Take  mg by mouth nightly as needed for sleep      valACYclovir (VALTREX) 1000 mg tablet Take 1,000 mg by mouth daily as needed (at onset of cold sore)       venlafaxine (EFFEXOR) 75 MG tablet Take 1 tablet (75 mg) by mouth At Bedtime  Qty: 30 tablet, Refills: 0    Comments: Renewal requests go to Brooke Billy with [facility].  Associated Diagnoses: Moderate episode of recurrent major depressive disorder (H); Generalized anxiety disorder      vitamin A 3 MG (13074 UNITS) capsule Take 10,000 Units by mouth daily       vitamin C (ASCORBIC ACID) 100 MG tablet Take 100 mg by mouth daily       vitamin D2 (ERGOCALCIFEROL) 88384 units (1250 mcg) capsule Take 1 capsule (50,000 Units) by mouth once a week for 10 doses  Qty: 10 capsule, Refills: 0     Associated Diagnoses: Vitamin D deficiency; Osteopenia      vitamin E (TOCOPHEROL) 400 units (180 mg) capsule Take 400 Units by mouth daily       Vitamin K, Phytonadione, 100 MCG TABS Take 100 mcg by mouth daily          STOP taking these medications       furosemide (LASIX) 40 MG tablet Comments:   Reason for Stopping:         lactulose (CHRONULAC) 10 GM/15ML solution Comments:   Reason for Stopping:             Allergies   Allergies   Allergen Reactions     Prednisone      Nsaids Other (See Comments)

## 2020-12-07 ENCOUNTER — APPOINTMENT (OUTPATIENT)
Dept: CT IMAGING | Facility: CLINIC | Age: 50
DRG: 442 | End: 2020-12-07
Payer: COMMERCIAL

## 2020-12-07 ENCOUNTER — APPOINTMENT (OUTPATIENT)
Dept: OCCUPATIONAL THERAPY | Facility: CLINIC | Age: 50
DRG: 442 | End: 2020-12-07
Payer: COMMERCIAL

## 2020-12-07 LAB
ANION GAP SERPL CALCULATED.3IONS-SCNC: 8 MMOL/L (ref 3–14)
BUN SERPL-MCNC: 15 MG/DL (ref 7–30)
CALCIUM SERPL-MCNC: 8.8 MG/DL (ref 8.5–10.1)
CHLORIDE SERPL-SCNC: 102 MMOL/L (ref 94–109)
CO2 SERPL-SCNC: 26 MMOL/L (ref 20–32)
CREAT SERPL-MCNC: 1.18 MG/DL (ref 0.52–1.04)
GFR SERPL CREATININE-BSD FRML MDRD: 54 ML/MIN/{1.73_M2}
GLUCOSE BLDC GLUCOMTR-MCNC: 121 MG/DL (ref 70–99)
GLUCOSE BLDC GLUCOMTR-MCNC: 148 MG/DL (ref 70–99)
GLUCOSE BLDC GLUCOMTR-MCNC: 182 MG/DL (ref 70–99)
GLUCOSE BLDC GLUCOMTR-MCNC: 277 MG/DL (ref 70–99)
GLUCOSE SERPL-MCNC: 127 MG/DL (ref 70–99)
MAGNESIUM SERPL-MCNC: 1.8 MG/DL (ref 1.6–2.3)
PHOSPHATE SERPL-MCNC: 3.2 MG/DL (ref 2.5–4.5)
PLATELET # BLD AUTO: 99 10E9/L (ref 150–450)
POTASSIUM SERPL-SCNC: 4 MMOL/L (ref 3.4–5.3)
SODIUM SERPL-SCNC: 136 MMOL/L (ref 133–144)

## 2020-12-07 PROCEDURE — 999N000147 HC STATISTIC PT IP EVAL DEFER

## 2020-12-07 PROCEDURE — 74176 CT ABD & PELVIS W/O CONTRAST: CPT

## 2020-12-07 PROCEDURE — 97535 SELF CARE MNGMENT TRAINING: CPT | Mod: GO

## 2020-12-07 PROCEDURE — 80048 BASIC METABOLIC PNL TOTAL CA: CPT | Performed by: STUDENT IN AN ORGANIZED HEALTH CARE EDUCATION/TRAINING PROGRAM

## 2020-12-07 PROCEDURE — 74176 CT ABD & PELVIS W/O CONTRAST: CPT | Mod: 26 | Performed by: STUDENT IN AN ORGANIZED HEALTH CARE EDUCATION/TRAINING PROGRAM

## 2020-12-07 PROCEDURE — 97530 THERAPEUTIC ACTIVITIES: CPT | Mod: GO

## 2020-12-07 PROCEDURE — 36415 COLL VENOUS BLD VENIPUNCTURE: CPT | Performed by: STUDENT IN AN ORGANIZED HEALTH CARE EDUCATION/TRAINING PROGRAM

## 2020-12-07 PROCEDURE — 258N000003 HC RX IP 258 OP 636: Performed by: STUDENT IN AN ORGANIZED HEALTH CARE EDUCATION/TRAINING PROGRAM

## 2020-12-07 PROCEDURE — 999N001017 HC STATISTIC GLUCOSE BY METER IP

## 2020-12-07 PROCEDURE — 120N000002 HC R&B MED SURG/OB UMMC

## 2020-12-07 PROCEDURE — 99233 SBSQ HOSP IP/OBS HIGH 50: CPT | Mod: GC | Performed by: STUDENT IN AN ORGANIZED HEALTH CARE EDUCATION/TRAINING PROGRAM

## 2020-12-07 PROCEDURE — 250N000013 HC RX MED GY IP 250 OP 250 PS 637: Performed by: STUDENT IN AN ORGANIZED HEALTH CARE EDUCATION/TRAINING PROGRAM

## 2020-12-07 PROCEDURE — 250N000013 HC RX MED GY IP 250 OP 250 PS 637: Performed by: HOSPITALIST

## 2020-12-07 PROCEDURE — 99207 PR CDG-MDM COMPONENT: MEETS HIGH - UP CODED: CPT | Performed by: STUDENT IN AN ORGANIZED HEALTH CARE EDUCATION/TRAINING PROGRAM

## 2020-12-07 PROCEDURE — 97165 OT EVAL LOW COMPLEX 30 MIN: CPT | Mod: GO

## 2020-12-07 PROCEDURE — 84100 ASSAY OF PHOSPHORUS: CPT | Performed by: STUDENT IN AN ORGANIZED HEALTH CARE EDUCATION/TRAINING PROGRAM

## 2020-12-07 PROCEDURE — 250N000011 HC RX IP 250 OP 636: Performed by: HOSPITALIST

## 2020-12-07 PROCEDURE — 85049 AUTOMATED PLATELET COUNT: CPT | Performed by: HOSPITALIST

## 2020-12-07 PROCEDURE — 36415 COLL VENOUS BLD VENIPUNCTURE: CPT | Performed by: HOSPITALIST

## 2020-12-07 PROCEDURE — 83735 ASSAY OF MAGNESIUM: CPT | Performed by: STUDENT IN AN ORGANIZED HEALTH CARE EDUCATION/TRAINING PROGRAM

## 2020-12-07 RX ORDER — PROCHLORPERAZINE 25 MG
25 SUPPOSITORY, RECTAL RECTAL EVERY 12 HOURS PRN
Status: DISCONTINUED | OUTPATIENT
Start: 2020-12-07 | End: 2020-12-08 | Stop reason: HOSPADM

## 2020-12-07 RX ORDER — PROCHLORPERAZINE MALEATE 5 MG
5 TABLET ORAL EVERY 6 HOURS PRN
Status: DISCONTINUED | OUTPATIENT
Start: 2020-12-07 | End: 2020-12-08 | Stop reason: HOSPADM

## 2020-12-07 RX ADMIN — VENLAFAXINE 75 MG: 75 TABLET ORAL at 22:13

## 2020-12-07 RX ADMIN — Medication 2 TABLET: at 18:24

## 2020-12-07 RX ADMIN — ENOXAPARIN SODIUM 40 MG: 40 INJECTION SUBCUTANEOUS at 09:20

## 2020-12-07 RX ADMIN — Medication 4000 UNITS: at 09:39

## 2020-12-07 RX ADMIN — CYANOCOBALAMIN TAB 1000 MCG 1000 MCG: 1000 TAB at 09:24

## 2020-12-07 RX ADMIN — VITAMIN E CAP 400 UNIT 400 UNITS: 400 CAP at 09:21

## 2020-12-07 RX ADMIN — LACTULOSE 30 G: 20 POWDER, FOR SOLUTION ORAL at 09:20

## 2020-12-07 RX ADMIN — ATORVASTATIN CALCIUM 20 MG: 20 TABLET, FILM COATED ORAL at 09:23

## 2020-12-07 RX ADMIN — LACTULOSE 30 G: 20 POWDER, FOR SOLUTION ORAL at 22:13

## 2020-12-07 RX ADMIN — RIFAXIMIN 550 MG: 550 TABLET ORAL at 19:56

## 2020-12-07 RX ADMIN — RIFAXIMIN 550 MG: 550 TABLET ORAL at 09:21

## 2020-12-07 RX ADMIN — INSULIN ASPART 2 UNITS: 100 INJECTION, SOLUTION INTRAVENOUS; SUBCUTANEOUS at 16:40

## 2020-12-07 RX ADMIN — OMEPRAZOLE 20 MG: 20 CAPSULE, DELAYED RELEASE ORAL at 09:23

## 2020-12-07 RX ADMIN — ACETAMINOPHEN 650 MG: 325 TABLET, FILM COATED ORAL at 18:28

## 2020-12-07 RX ADMIN — Medication 10000 UNITS: at 09:25

## 2020-12-07 RX ADMIN — LEVOTHYROXINE SODIUM 175 MCG: 125 TABLET ORAL at 13:32

## 2020-12-07 RX ADMIN — LACTULOSE 30 G: 20 POWDER, FOR SOLUTION ORAL at 19:56

## 2020-12-07 RX ADMIN — FERROUS SULFATE TAB 325 MG (65 MG ELEMENTAL FE) 325 MG: 325 (65 FE) TAB at 09:24

## 2020-12-07 RX ADMIN — SUMATRIPTAN SUCCINATE 50 MG: 50 TABLET ORAL at 03:39

## 2020-12-07 RX ADMIN — INSULIN ASPART 1 UNITS: 100 INJECTION, SOLUTION INTRAVENOUS; SUBCUTANEOUS at 11:37

## 2020-12-07 RX ADMIN — LACTULOSE 30 G: 20 POWDER, FOR SOLUTION ORAL at 13:33

## 2020-12-07 RX ADMIN — Medication 0.1 MG: at 09:21

## 2020-12-07 RX ADMIN — SODIUM CHLORIDE, POTASSIUM CHLORIDE, SODIUM LACTATE AND CALCIUM CHLORIDE 500 ML: 600; 310; 30; 20 INJECTION, SOLUTION INTRAVENOUS at 10:22

## 2020-12-07 RX ADMIN — Medication 125 MG: at 09:23

## 2020-12-07 RX ADMIN — LACTULOSE 30 G: 20 POWDER, FOR SOLUTION ORAL at 16:39

## 2020-12-07 RX ADMIN — THIAMINE HCL TAB 100 MG 100 MG: 100 TAB at 09:23

## 2020-12-07 RX ADMIN — FOLIC ACID 1 MG: 1 TABLET ORAL at 09:23

## 2020-12-07 RX ADMIN — ONDANSETRON 4 MG: 4 TABLET, ORALLY DISINTEGRATING ORAL at 10:33

## 2020-12-07 RX ADMIN — Medication 2 TABLET: at 09:24

## 2020-12-07 RX ADMIN — ONDANSETRON 4 MG: 4 TABLET, ORALLY DISINTEGRATING ORAL at 02:55

## 2020-12-07 ASSESSMENT — ACTIVITIES OF DAILY LIVING (ADL)
ADLS_ACUITY_SCORE: 19
PREVIOUS_RESPONSIBILITIES: MEAL PREP;HOUSEKEEPING;SHOPPING;MEDICATION MANAGEMENT;FINANCES;DRIVING
ADLS_ACUITY_SCORE: 19

## 2020-12-07 NOTE — PLAN OF CARE
AVSS.  99% RA.  Alert and oriented x4.  Pt with fatigue, weakness.  HE protocol=0.  Scheduled lactulose.    Up with SBA.  Voiding spont, BM x1.  0300 Pt with headache and nausea - imitrex, zofran given with relief.  Denies pain, tolerating diet.  SL.    Cont with POC.

## 2020-12-07 NOTE — PROGRESS NOTES
Transplant Social Work Services Progress Note    Date of Initial Social Work Evaluation: 10/19/2020  Collaborated with: Susan at bedside and manuel assistance     Data: Susan is known to this writer as she is being evaluated for liver transplant. I have been in contact with Susan in regards to her finances and to provide psychosocial support during her evaluation. I received a call from Susan inquiring about manuel assistance that I applied for and indicating that she is currently hospitalized.   I spoke with our manuel assistance and $500 was approved to be paid directly towards their mortgage.    I met with Susan at bedside and introduced myself in person. She reports anxiety related to her hospitalization and her current health. She indicated that her family is doing well, and her nasuea and fatigue increased last week. I reminded her of our liver transplant support group and she asked that I resend her the information. I informed Susan that $500 was approved to be paid directly towards her mortgage. She is grateful for this assistance under their circumstances. I also discussed calling senior linkage line regarding her LT income and social security disability.   Intervention: Supportive counseling, motivational interviewing   Assessment: Susan presents as anxious about transplantation and waiting   Education provided by SW: Senior linkage line, liver transplant support group.   Plan: This writer sent senior linkage line information and information about our liver transplant support group    RENARD Coronado, [facility]  Liver Transplant   [facility]  Phone: [phone]  Pager: [phone]

## 2020-12-07 NOTE — PLAN OF CARE
PT 7C: defer    Pt reporting having no mobility concerns to discharge home and does not have any IP PT needs. Pt able to ambulate greater than 200 ft without loss of balance. Pt stating she is open to going to OP PT to continue working on strength, endurance and balance. Pt would benefit from ongoing PT. Based on current status, OP PT is more appropriate vs HHPT.  PT to complete orders.

## 2020-12-07 NOTE — PROGRESS NOTES
[facility]     Progress Note - Maroon 1 Service        Date of Admission:  12/4/2020    Assessment & Plan       Susan Puckett is a 50 year old female admitted on 12/4/2020. h/o cirrhosis 2/2 CUETO c/b hepatic encephalopathy, type 2 DM, acute on chronic anemia, s/p Faustino En Y Gastric Bypass (2006), GERD, hypothyroidism, and depression who presents with 10 days of progressive fatigue and re-onset of confusion following recent admission for confusion due to hepatic encephalopathy.    #Acute Encephalopathy  #Liver Cirrhosis 2/2 CUETO c/b hepatic encephalopathy  History consistent with hepatic encephalopathy. Possible suboptimal ammonia clearance due to inadequate lactulose dosage. Patient also reports eating large amounts of chicken. Low concern for infectious cause or acute bleed. Plan to up titrate lactulose with continuation of rifaximin.    - [facility] protocol   - Continue PTA Rifaximin 550mg BID  - Lactulose 30 6 times/day  - Thiamine and Folate  - Continue PTA Furosemide and Spironolactone  - Awaiting liver transplant, follows with Dr. Cook     #GEORGETTE  Likely prerenal from poor PO intake. Cr elevated to 1.18, baseline likely ~0.8  -encourage fluid intake  -repeat BMP at 1500. If improved, may be able to discharge 12/7-12/8    #L flank/abdominal pain  Considered if due to anxiety, nausea, constipation, urinary stone. Likely secondary to poor PO intake. UA with hyaline casts.  -encourage fluids as above  -CT abdomen/pelvis for eval with oral contrast    #Acute on Chronic Macrocytic Anemia  Baseline Hgb 11-12; was 9.5 at recent discharge.  Hgb 10.0 today. No sign of acute bleed.    - Continue PTA ferrous sulfate  - Continue B12     #Hypoalbuminemia   #Hyponatremia  #Weakness  Likely 2/2 to known cirrhosis and poor nutrition in the setting of poor liver function vs. deconditioning. Hyponatremia resolved.  - BMP daily  - PT/OT for placement concern     #Type 2 DM  Outpatient  diagnosis, recently switched from metformin to Sitagliptin.  - low-dose sliding scale insulin  - gluc checks     Diet: NPO for Medical/Clinical Reasons Except for: Meds    Fluids: PO intake  Lines: Peripheral IV  DVT Prophylaxis: Enoxaparin (Lovenox) SQ  Rossi Catheter: not present  Code Status: Full Code           Disposition Plan   Expected discharge: 1 - 2 days, recommended to prior living arrangement once adequate pain management/ tolerating PO medications.  Entered: Alfredo Masterson MD 12/07/2020, 10:43 AM       The patient's care was discussed with the Attending Physician, Dr. Freedman.    Alfredo Masterson MD  Resident Physician, PGY-1  Internal Medicine-Dermatology  Pager: [phone]  ______________________________________________________________________    Interval History   No acute events overnight. Feels fatigued and generally weak today, but denies any confusion. Has had some nausea, managed well with Zofran. Denies any blood in stool urine or vomit. Denies any fever or chills.    Data reviewed today: I reviewed all medications, new labs and imaging results over the last 24 hours  Physical Exam   Vital Signs: Temp: 97.4  F (36.3  C) Temp src: Oral BP: 115/84 Pulse: 66   Resp: 16 SpO2: 98 % O2 Device: None (Room air)    Weight: 127 lbs 6.81 oz    General: tired-appearing woman in NAD  HEENT: Anicteric sclerae  Respiratory: Normal respiratory effort  Abdomen/Back: Nondistended, ttp in LUQ/L flank on deep palpation   Musculoskeletal: Moving extremities well.  Neuro: Alert and oriented x3. CN 2-12 grossly intact.  Skin: Not jaundiced.      Data   Recent Labs   Lab 12/07/20  0642 12/06/20  1300 12/05/20  0548   WBC  --   --  4.1   HGB  --   --  10.0*   MCV  --   --  108*   PLT 99*  --  104*     --  135   POTASSIUM 4.0  --  3.8  3.8   CHLORIDE 102  --  100   CO2 26  --  29   BUN 15  --  16   CR 1.18*  --  1.05*   ANIONGAP 8  --  6   MAURI 8.8  --  8.9   *  --  231*   ALBUMIN  --   --  2.6*   PROTTOTAL   --   --  6.9   BILITOTAL  --   --  0.7   ALKPHOS  --   --  136   ALT  --   --  28   AST  --   --  35   LIPASE  --  14*  --

## 2020-12-07 NOTE — PLAN OF CARE
Occupational Therapy Discharge Summary    Reason for therapy discharge:    All goals and outcomes met, no further needs identified.    Progress towards therapy goal(s). See goals on Care Plan in [facility] electronic health record for goal details.  Goals met    Therapy recommendation(s):    Continued therapy is recommended.  Rationale/Recommendations:  Pt reports she will be starting HH OT this week. Pt would benefit from HH OT or outpatient OT to increase safety and independence with IADLs.

## 2020-12-07 NOTE — PROGRESS NOTES
12/07/20 0805   Quick Adds   Type of Visit Initial Occupational Therapy Evaluation   Living Environment   People in home spouse   Current Living Arrangements house   Home Accessibility stairs to enter home;stairs within home   Number of Stairs, Main Entrance 3   Stair Railings, Main Entrance railings safe and in good condition   Number of Stairs, Within Home, Primary other (see comments)  (Unspecified, pt does not utilize stairs)   Stair Railings, Within Home, Primary none   Transportation Anticipated family or friend will provide   Living Environment Comments Pt lives in a single story home with her . She utilizes a tub/showr combo, currently in the process of getting shower chair.   Self-Care   Usual Activity Tolerance moderate   Current Activity Tolerance fair   Regular Exercise No   Equipment Currently Used at Home none   Disability/Function   Hearing Difficulty or Deaf no   Wear Glasses or Blind yes   Vision Management Reading glasses   Concentrating, Remembering or Making Decisions Difficulty no   Difficulty Communicating no   Difficulty Eating/Swallowing no   Walking or Climbing Stairs Difficulty no   Dressing/Bathing Difficulty no   Toileting issues no   Doing Errands Independently Difficulty (such as shopping) no   Fall history within last six months no   Change in Functional Status Since Onset of Current Illness/Injury yes   General Information   Onset of Illness/Injury or Date of Surgery 12/04/20   Referring Physician Alfredo Masterson MD   Patient/Family Therapy Goal Statement (OT) Pt wishes to return home.   Additional Occupational Profile Info/Pertinent History of Current Problem Susan Puckett is a 50 year old female admitted on 12/4/2020. h/o cirrhosis 2/2 CUETO c/b hepatic encephalopathy, type 2 DM, acute on chronic anemia, s/p Faustino En Y Gastric Bypass (2006), GERD, hypothyroidism, and depression who presents with 10 days of progressive fatigue and re-onset of confusion following recent  admission for confusion due to hepatic encephalopathy.   Existing Precautions/Restrictions no known precautions/restrictions   General Observations and Info Pt supine in bed upon arrival, agreeable to therapy.   Cognitive Status Examination   Orientation Status orientation to person, place and time   Sensory   Sensory Comments Pt reports no numbness or tingling in hands or feet.   Pain Assessment   Patient Currently in Pain No   Integumentary/Edema   Integumentary/Edema no deficits were identifed   Range of Motion Comprehensive   General Range of Motion bilateral upper extremity ROM WFL   Strength Comprehensive (MMT)   Comment, General Manual Muscle Testing (MMT) Assessment Generalized weakness   Instrumental Activities of Daily Living (IADL)   Previous Responsibilities meal prep;housekeeping;shopping;medication management;finances;driving   IADL Comments Pt reports her  does the laundry and most of the cooking. Pt rarely drives. Shares remainder of IADL responsibilities with .   Clinical Impression   Criteria for Skilled Therapeutic Interventions Met (OT) no problems identified which require skilled intervention   OT Diagnosis Weakness   OT Problem List-Impairments impacting ADL strength;activity tolerance impaired   Assessment of Occupational Performance 1-3 Performance Deficits   Identified Performance Deficits Bathing, home mgmt, activity tolerance   Planned Therapy Interventions (OT) strengthening;ADL retraining;IADL retraining   Clinical Decision Making Complexity (OT) low complexity   Therapy Frequency (OT) 1x eval and treat   Predicted Duration of Therapy 1x eval and treat   Anticipated Equipment Needs Upon Discharge (OT) shower chair  (Pt in the process of acquiring a shower chair)   Risks and Benefits of Treatment have been explained. Yes   Patient, Family & other staff in agreement with plan of care Yes   OT Discharge Planning    OT Discharge Recommendation (DC Rec) Home with assist;home  with home care occupational therapy   OT Rationale for DC Rec Pt is moving well and completing ADLs INDly. Pt reports she is starting HH OT this week. Pt will have assist at home from her  as needed.   OT Brief overview of current status  Up with SBA.   Total Evaluation Time (Minutes)   Total Evaluation Time (Minutes) 4

## 2020-12-07 NOTE — PROGRESS NOTES
CLINICAL NUTRITION SERVICES - ASSESSMENT NOTE     Nutrition Prescription    RECOMMENDATIONS FOR MDs/PROVIDERS TO ORDER:  1. Pt's vitamin B12 level 2139 (H) on 11/25/20.  Adjust vitamin B12 dose as/if indicated per provider discretion.    2. Recommend check vitamin A and E levels to determine ongoing appropriateness of current supplementation.  Pt also on vitamin D, but vitamin D deficiency screen was 14 (L) when recently checked on 10/20/20    Malnutrition Status:    Unable to determine due to pt unavailable during attempts to interview    Recommendations already ordered by Registered Dietitian (RD):  None at this time     Future/Additional Recommendations:  Encourage patient to consume at least 75% of meals TID or the equivalent with snacks/supplements.  If consuming less than this offer supplements, scheduled snacks, and/or consider ordering calorie counts to assess PO intake adequacy.      REASON FOR ASSESSMENT  Susan Puckett is a/an 50 year old female assessed by the dietitian for Malnutrition screen tool (score >/=2 )    NUTRITION HISTORY  Obtained info from chart, pt unavailable during attempts to visit today. 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    Pt recently visited with OP RD on 10/19/20; at that time pt reported trying to follow a Mediterranean diet.  Pt reported nausea being a barrier to eating at that time, and that meat was not appealing to her.  RD provided education on adequate protein intake and reviewed post-tx diet guidelines briefly  Pt provided the following diet recall at that time:    Diet Recall  Today- 1/4 banana, eggs with toast, meat s/w (just started to eat but unable to finish), Greek yogurt  Janis- milk (12 oz 2%), water, apple juice (8 oz)   Eating out- rare, but just a few bites    CURRENT NUTRITION ORDERS  Diet: Regular  Intake/Tolerance: was NPO this morning for CT but  "advanced back to regular diet this afternoon.    LABS  Labs reviewed  - 12/4: Ammonia 154 (H)  - 11/25/20: Vitamin B12 2139 (H)  - 10/20/20: Vitamin D deficiency screen 14 (L)  - Pt with GEORGETTE currently per provider note today    MEDICATIONS  Medications reviewed  - Ascorbic acid (125 mg/day)  - Calcium/vitamin D (315-250 mg/unit), 2 tablets BID  - Vitamin B12 (1000 mcg/day)  - Ferrous sulfate (325 mg/day)  - Folic acid (1 mg/day)  - Thiamine (100 mg/day)  - Vitamin A [10,000 international unit(s)/day]  - Vitamin D3 [4,000 international unit(s)/day]  - Vitamin E [400 international unit(s)/day]  - Lasix and spironolactone (both on hold by provider per MAR)  - Low sliding scale insulin TID before meals + at bedtime  - 500 mL LR bolus this morning (one-time order)  - Lactulose 6 times/day    ANTHROPOMETRICS  Height: 167.6 cm (5' 6\")  Most Recent Weight: 57.8 kg (127 lb 6.8 oz)    IBW: 59.1 kg   BMI: Normal BMI  Weight History: Wt fluctuating over the past 2-3 months (suspect fluid related at least in part 2/2 diuretics?).  Current weight is stable from wt back on 10/20/20.  Wt Readings from Last 10 Encounters:   12/06/20 57.8 kg (127 lb 6.8 oz)   11/24/20 59.6 kg (131 lb 6.4 oz)   10/25/20 60.5 kg (133 lb 6.4 oz)   10/20/20 57.5 kg (126 lb 11.2 oz)   09/25/20 60.8 kg (134 lb)   07/24/14 79.8 kg (176 lb)      Dosing Weight: 58 kg (actual)    ASSESSED NUTRITION NEEDS  Estimated Energy Needs: 0796-4152 kcals/day (30 - 35 kcals/kg)  Justification: Increased needs with cirrhosis  Estimated Protein Needs: 58-70 grams protein/day (1 - 1.2 grams of pro/kg)  Justification: Increased needs with cirrhosis  Estimated Fluid Needs: (1 mL/kcal)   Justification: maintenance, or per provider pending fluid status    PHYSICAL FINDINGS  See malnutrition section below.    MALNUTRITION  % Intake: Unable to assess  % Weight Loss: Unable to assess  Subcutaneous Fat Loss: Unable to assess  Muscle Loss: Unable to assess  Fluid Accumulation/Edema: " None noted per chart review  Malnutrition Diagnosis: Unable to determine due to pt unavailable during attempts to interview    NUTRITION DIAGNOSIS  Increased nutrient needs (protein-energy) related to increased estimated needs with cirrhosis as evidenced by Estimated Energy Needs: 7174-3532 kcals/day (30 - 35 kcals/kg) and Estimated Protein Needs: 58-70 grams protein/day (1 - 1.2 grams of pro/kg)    INTERVENTIONS  Implementation  Nutrition Education: unable to complete due to pt unavaialble    Goals  Patient to consume % of nutritionally adequate meal trays TID, or the equivalent with supplements/snacks.     Monitoring/Evaluation  Progress toward goals will be monitored and evaluated per protocol.     Brandi Brambila, RD, LD  7C RD pager: [phone]

## 2020-12-08 ENCOUNTER — PATIENT OUTREACH (OUTPATIENT)
Dept: CARE COORDINATION | Facility: CLINIC | Age: 50
End: 2020-12-08

## 2020-12-08 VITALS
OXYGEN SATURATION: 100 % | TEMPERATURE: 96.2 F | BODY MASS INDEX: 20.48 KG/M2 | RESPIRATION RATE: 16 BRPM | DIASTOLIC BLOOD PRESSURE: 88 MMHG | HEART RATE: 69 BPM | HEIGHT: 66 IN | SYSTOLIC BLOOD PRESSURE: 131 MMHG | WEIGHT: 127.43 LBS

## 2020-12-08 LAB
ALBUMIN SERPL-MCNC: 2.6 G/DL (ref 3.4–5)
ALP SERPL-CCNC: 146 U/L (ref 40–150)
ALT SERPL W P-5'-P-CCNC: 29 U/L (ref 0–50)
ANION GAP SERPL CALCULATED.3IONS-SCNC: 8 MMOL/L (ref 3–14)
AST SERPL W P-5'-P-CCNC: 36 U/L (ref 0–45)
BILIRUB SERPL-MCNC: 0.8 MG/DL (ref 0.2–1.3)
BUN SERPL-MCNC: 9 MG/DL (ref 7–30)
CALCIUM SERPL-MCNC: 9.1 MG/DL (ref 8.5–10.1)
CHLORIDE SERPL-SCNC: 102 MMOL/L (ref 94–109)
CO2 SERPL-SCNC: 25 MMOL/L (ref 20–32)
CREAT SERPL-MCNC: 0.99 MG/DL (ref 0.52–1.04)
ERYTHROCYTE [DISTWIDTH] IN BLOOD BY AUTOMATED COUNT: 12.8 % (ref 10–15)
GFR SERPL CREATININE-BSD FRML MDRD: 68 ML/MIN/{1.73_M2}
GLUCOSE BLDC GLUCOMTR-MCNC: 128 MG/DL (ref 70–99)
GLUCOSE BLDC GLUCOMTR-MCNC: 137 MG/DL (ref 70–99)
GLUCOSE BLDC GLUCOMTR-MCNC: 150 MG/DL (ref 70–99)
GLUCOSE SERPL-MCNC: 157 MG/DL (ref 70–99)
HCT VFR BLD AUTO: 32.5 % (ref 35–47)
HGB BLD-MCNC: 10.7 G/DL (ref 11.7–15.7)
MAGNESIUM SERPL-MCNC: 1.8 MG/DL (ref 1.6–2.3)
MCH RBC QN AUTO: 34.2 PG (ref 26.5–33)
MCHC RBC AUTO-ENTMCNC: 32.9 G/DL (ref 31.5–36.5)
MCV RBC AUTO: 104 FL (ref 78–100)
PHOSPHATE SERPL-MCNC: 3 MG/DL (ref 2.5–4.5)
PLATELET # BLD AUTO: 110 10E9/L (ref 150–450)
POTASSIUM SERPL-SCNC: 3.9 MMOL/L (ref 3.4–5.3)
PROT SERPL-MCNC: 7.2 G/DL (ref 6.8–8.8)
RBC # BLD AUTO: 3.13 10E12/L (ref 3.8–5.2)
SODIUM SERPL-SCNC: 135 MMOL/L (ref 133–144)
WBC # BLD AUTO: 3.8 10E9/L (ref 4–11)

## 2020-12-08 PROCEDURE — 250N000013 HC RX MED GY IP 250 OP 250 PS 637: Performed by: HOSPITALIST

## 2020-12-08 PROCEDURE — 36415 COLL VENOUS BLD VENIPUNCTURE: CPT | Performed by: STUDENT IN AN ORGANIZED HEALTH CARE EDUCATION/TRAINING PROGRAM

## 2020-12-08 PROCEDURE — 83735 ASSAY OF MAGNESIUM: CPT | Performed by: STUDENT IN AN ORGANIZED HEALTH CARE EDUCATION/TRAINING PROGRAM

## 2020-12-08 PROCEDURE — 250N000011 HC RX IP 250 OP 636: Performed by: HOSPITALIST

## 2020-12-08 PROCEDURE — 80053 COMPREHEN METABOLIC PANEL: CPT | Performed by: STUDENT IN AN ORGANIZED HEALTH CARE EDUCATION/TRAINING PROGRAM

## 2020-12-08 PROCEDURE — 250N000013 HC RX MED GY IP 250 OP 250 PS 637: Performed by: STUDENT IN AN ORGANIZED HEALTH CARE EDUCATION/TRAINING PROGRAM

## 2020-12-08 PROCEDURE — 999N001017 HC STATISTIC GLUCOSE BY METER IP

## 2020-12-08 PROCEDURE — 84100 ASSAY OF PHOSPHORUS: CPT | Performed by: STUDENT IN AN ORGANIZED HEALTH CARE EDUCATION/TRAINING PROGRAM

## 2020-12-08 PROCEDURE — 99239 HOSP IP/OBS DSCHRG MGMT >30: CPT | Mod: GC | Performed by: STUDENT IN AN ORGANIZED HEALTH CARE EDUCATION/TRAINING PROGRAM

## 2020-12-08 PROCEDURE — 85027 COMPLETE CBC AUTOMATED: CPT | Performed by: STUDENT IN AN ORGANIZED HEALTH CARE EDUCATION/TRAINING PROGRAM

## 2020-12-08 RX ORDER — LACTULOSE 10 G/10G
30 SOLUTION ORAL
Qty: 540 PACKET | Refills: 0 | Status: SHIPPED | OUTPATIENT
Start: 2020-12-08 | End: 2020-12-17

## 2020-12-08 RX ORDER — LEVOTHYROXINE SODIUM 175 UG/1
175 TABLET ORAL DAILY
Qty: 30 TABLET | Refills: 0 | Status: SHIPPED | OUTPATIENT
Start: 2020-12-09 | End: 2021-11-03

## 2020-12-08 RX ORDER — PROCHLORPERAZINE MALEATE 5 MG
5 TABLET ORAL EVERY 6 HOURS PRN
Qty: 8 TABLET | Refills: 0 | Status: SHIPPED | OUTPATIENT
Start: 2020-12-08 | End: 2021-04-07

## 2020-12-08 RX ADMIN — ATORVASTATIN CALCIUM 20 MG: 20 TABLET, FILM COATED ORAL at 08:48

## 2020-12-08 RX ADMIN — OMEPRAZOLE 20 MG: 20 CAPSULE, DELAYED RELEASE ORAL at 08:49

## 2020-12-08 RX ADMIN — FOLIC ACID 1 MG: 1 TABLET ORAL at 08:49

## 2020-12-08 RX ADMIN — INSULIN ASPART 1 UNITS: 100 INJECTION, SOLUTION INTRAVENOUS; SUBCUTANEOUS at 07:31

## 2020-12-08 RX ADMIN — Medication 10000 UNITS: at 08:47

## 2020-12-08 RX ADMIN — LEVOTHYROXINE SODIUM 175 MCG: 125 TABLET ORAL at 08:48

## 2020-12-08 RX ADMIN — Medication 125 MG: at 08:49

## 2020-12-08 RX ADMIN — Medication 4000 UNITS: at 08:47

## 2020-12-08 RX ADMIN — Medication 0.1 MG: at 08:47

## 2020-12-08 RX ADMIN — FERROUS SULFATE TAB 325 MG (65 MG ELEMENTAL FE) 325 MG: 325 (65 FE) TAB at 13:35

## 2020-12-08 RX ADMIN — SUMATRIPTAN SUCCINATE 50 MG: 50 TABLET ORAL at 04:29

## 2020-12-08 RX ADMIN — THIAMINE HCL TAB 100 MG 100 MG: 100 TAB at 08:48

## 2020-12-08 RX ADMIN — LACTULOSE 30 G: 20 POWDER, FOR SOLUTION ORAL at 06:26

## 2020-12-08 RX ADMIN — CYANOCOBALAMIN TAB 1000 MCG 1000 MCG: 1000 TAB at 08:49

## 2020-12-08 RX ADMIN — LACTULOSE 30 G: 20 POWDER, FOR SOLUTION ORAL at 09:53

## 2020-12-08 RX ADMIN — RIFAXIMIN 550 MG: 550 TABLET ORAL at 08:49

## 2020-12-08 RX ADMIN — VITAMIN E CAP 400 UNIT 400 UNITS: 400 CAP at 08:49

## 2020-12-08 RX ADMIN — LACTULOSE 30 G: 20 POWDER, FOR SOLUTION ORAL at 13:35

## 2020-12-08 RX ADMIN — ENOXAPARIN SODIUM 40 MG: 40 INJECTION SUBCUTANEOUS at 08:47

## 2020-12-08 RX ADMIN — Medication 2 TABLET: at 08:49

## 2020-12-08 ASSESSMENT — ACTIVITIES OF DAILY LIVING (ADL)
ADLS_ACUITY_SCORE: 19

## 2020-12-08 NOTE — PLAN OF CARE
VSS on room air. Aox4. Up independently in room. Denies pain. On regular diet. No nausea/vomiting. On scheduled lactulose, reports having at least 4 soft/loose stools per day. Patient education completed. AVS reviewed with patient and signed. Transport set up and patient taken to discharge pharmacy. PIV removed.

## 2020-12-08 NOTE — PROGRESS NOTES
The patient is scheduled for clinic follow up within 24-48 hours of hospital discharge. No post-hospital call is needed at this time.    Name: Susan Puckett MRN: [id number]   Date: 12/9/2020 Status: [facility]   Time: 10:30 AM Length: 45   Visit Type: VIDEO VISIT RETURN [2708] YEISON: [id number]   Provider: Shahzad Hatch PA-C Department:  HEPATOLOGY         Cecily Bradshaw CMA, [facility]  Post Hospital Discharge Team

## 2020-12-08 NOTE — PLAN OF CARE
VSS on room air. Alert and oriented x4, no signs of hepatic encephalopathy. Has had 5 stools so far today, reports that they are soft. Tylenol given for headache. Tolerating small amounts of food. Voiding with adequate urine output. Up with SBA.

## 2020-12-08 NOTE — PLAN OF CARE
VSS. A&O x4. No signs of encephalopathy. Scheduled lactulose given while awake, pt reported 3 more BMs overnight. Imitrex given x1 for headache. OK appetite, denies nausea. Voiding adequately. PIV SL. Pt UAL.

## 2020-12-08 NOTE — PROGRESS NOTES
[facility]   Patient is currently open to home care services with [facility]. The patient is currently receiving RN PT services. [facility]  and team have been notified of patient admission. [facility] liaison will continue to follow patient during stay. If appropriate provide orders to resume home care at time of discharge.    Clemencia Whalen RN BSN  [facility] Liaison  [phone]

## 2020-12-09 ENCOUNTER — VIRTUAL VISIT (OUTPATIENT)
Dept: GASTROENTEROLOGY | Facility: CLINIC | Age: 50
End: 2020-12-09
Attending: INTERNAL MEDICINE
Payer: COMMERCIAL

## 2020-12-09 DIAGNOSIS — K76.82 HEPATIC ENCEPHALOPATHY (H): Primary | ICD-10-CM

## 2020-12-09 DIAGNOSIS — F33.1 MAJOR DEPRESSIVE DISORDER, RECURRENT EPISODE, MODERATE (H): ICD-10-CM

## 2020-12-09 DIAGNOSIS — K75.81 NASH (NONALCOHOLIC STEATOHEPATITIS): ICD-10-CM

## 2020-12-09 DIAGNOSIS — K74.60 CIRRHOSIS OF LIVER WITH ASCITES, UNSPECIFIED HEPATIC CIRRHOSIS TYPE (H): ICD-10-CM

## 2020-12-09 DIAGNOSIS — R18.8 CIRRHOSIS OF LIVER WITH ASCITES, UNSPECIFIED HEPATIC CIRRHOSIS TYPE (H): ICD-10-CM

## 2020-12-09 DIAGNOSIS — F41.9 ANXIETY: ICD-10-CM

## 2020-12-09 PROCEDURE — 99215 OFFICE O/P EST HI 40 MIN: CPT | Mod: 95 | Performed by: PHYSICIAN ASSISTANT

## 2020-12-09 RX ORDER — LACTULOSE 20 G/30ML
30 SOLUTION ORAL 2 TIMES DAILY
Qty: 1 BOTTLE | Refills: 0
Start: 2020-12-09 | End: 2021-06-03

## 2020-12-09 NOTE — PROGRESS NOTES
Hepatology Follow-up Clinic note  Susan Puckett   Date of Birth 1970  Date of Service 12/9/2020    Reason for follow-up: CUETO cirrhosis          Assessment/plan:   Susan Puckett is a 50 year old female with history of CUETO cirrhosis, complicated by history of ascites, now controlled with diuretics, and recent hospitalizations (X2)  for hepatic encephalopathy with in the past few months. Most recent MELD-Na 9 (INR 1.2, Cr. 1.05) and her albumin was down to 2/6. Her transaminases have normalized over the past few months. She was very tearful today talking about her quality of life and inability to work due to her cognition and frequent restroom breaks. We spent a lot of time today discuss the natural history of cirrhosis. We also discussed optimizing her nutrition,  verall strength and mental health in the setting of chronic disease. She is up date on HCC and variceal screening. Recommend that she follow with Dr. Cook to discuss risk/benefit of liver transplant further.     # History ascites, controlled with diuretics:   - Continue 200 mg spironolactone daily   - Restart 40 mg twice daily   - Repeat BMP in one week   - high protein diet (60-70 gm/day) and 2000 mg sodium diet     # History of hepatic enpcehalopathy  - Continue lactulose (titrate dose to 3-5 bowel movements a day)  - Continue Rifaximin 550 mg twice daily    # Recommend optimization of her mental health with chronic disease management and depression     # Continue optimization of blood glucose levels   # Avoid alcohol     # Follow-up in clinic with Dr. Cook in 3 months     Shahzad Hatch PA-C   [facility] Hepatology clinic    -----------------------------------------------------       HPI:   Susan Puckett is a 50 year old female presenting for follow-up.     ETOH Cirrhosis  Complicated by:  - Ascites  - History of HE   - No history of GI Bleed  EGD: 8/25/2020   HCC: 12/5/2020     Patient was last seen by Dr. Cook on 9/28/2020. She was  recently hospitalized for hepatic encephalopathy, who had been experiencing progressive fatigue prior to admission.      Last paracentesis was in June and last paracentesis was in July. Currently on spironolactone - 100 mg twice daily. Her furosemide was held during recent hospitalization. She was previously taking. Lasix - 40 mg and 40 mg.     She is taking 3 doses of lactulose most days and having 4-5 bowel movements. Just got two teeth pulled a few weeks ago. Heightened since of smell since July/August.     Patient denies jaundice, lower extremity edema. Patient also denies melena, hematochezia or hematemesis.  Patient denies fevers, sweats or chills.    No history of significant alcohol use. Has not been able work for awhile and this has been very difficult for her. Admits to being very depressed of her current situation.     Medical hx Surgical hx   Past Medical History:   Diagnosis Date     Depressive disorder      Diabetes (H)      History of blood transfusion      Liver cirrhosis secondary to CUETO (H) 05/28/2020     Thyroid disease     Past Surgical History:   Procedure Laterality Date     APPENDECTOMY      1989     COLONOSCOPY      1/2020 at [facility]      ENT SURGERY       GI SURGERY      EGD August 2020 at [facility]      GYN SURGERY      2010     RNY gastric bypass  01/2006    retoocolic, retrogastric, [facility]     VASCULAR SURGERY                   Medications:     Current Outpatient Medications   Medication     atorvastatin (LIPITOR) 20 MG tablet     calcium carbonate (TUMS) 500 MG chewable tablet     calcium citrate-vitamin D (CITRACAL W/D) 250-100 MG-UNIT tablet     cholecalciferol (VITAMIN D-1000 MAX ST) 25 MCG (1000 UT) TABS     cyanocobalamin (VITAMIN B-12) 1000 MCG tablet     Ferrous Sulfate (IRON) 325 (65 FE) MG tablet     folic acid (FOLVITE) 1 MG tablet     lactulose (CEPHULAC) 10 GM packet     levothyroxine (SYNTHROID/LEVOTHROID) 175 MCG tablet     multivitamin (DEKAS ESSENTIAL)  capsule     omeprazole (PRILOSEC) 20 MG DR capsule     ondansetron (ZOFRAN-ODT) 4 MG ODT tab     prochlorperazine (COMPAZINE) 5 MG tablet     rifaximin (XIFAXAN) 550 MG TABS tablet     sitagliptin (JANUVIA) 100 MG tablet     spironolactone (ALDACTONE) 100 MG tablet     SUMAtriptan (IMITREX) 50 MG tablet     traZODone (DESYREL) 100 MG tablet     valACYclovir (VALTREX) 1000 mg tablet     venlafaxine (EFFEXOR) 75 MG tablet     vitamin A 3 MG (59704 UNITS) capsule     vitamin C (ASCORBIC ACID) 100 MG tablet     vitamin D2 (ERGOCALCIFEROL) 45998 units (1250 mcg) capsule     vitamin E (TOCOPHEROL) 400 units (180 mg) capsule     Vitamin K, Phytonadione, 100 MCG TABS     No current facility-administered medications for this visit.             Allergies:     Allergies   Allergen Reactions     Prednisone      Nsaids Other (See Comments)            Review of Systems:   10 points ROS was obtained and highlighted in the HPI, otherwise negative.          Physical Exam:     GENERAL: healthy, alert and no distress  EYES: Eyes grossly normal to inspection, conjunctivae and sclerae normal  RESP: no audible wheeze, cough, or visible cyanosis.  No visible retractions or increased work of breathing.  Able to speak fully in complete sentences  NEURO: Cranial nerves grossly intact, mentation intact and speech normal  PSYCH: mentation appears normal, judgement and insight intact, normal speech and appearance well-groomed, affect tearful          Data:   Reviewed in person and significant for:    Lab Results   Component Value Date     12/08/2020      Lab Results   Component Value Date    POTASSIUM 3.9 12/08/2020     Lab Results   Component Value Date    CHLORIDE 102 12/08/2020     Lab Results   Component Value Date    CO2 25 12/08/2020     Lab Results   Component Value Date    BUN 9 12/08/2020     Lab Results   Component Value Date    CR 0.99 12/08/2020       Lab Results   Component Value Date    WBC 3.8 12/08/2020     Lab Results    Component Value Date    HGB 10.7 12/08/2020     Lab Results   Component Value Date    HCT 32.5 12/08/2020     Lab Results   Component Value Date     12/08/2020     Lab Results   Component Value Date     12/08/2020       Lab Results   Component Value Date    AST 36 12/08/2020     Lab Results   Component Value Date    ALT 29 12/08/2020     No results found for: BILICONJ   Lab Results   Component Value Date    BILITOTAL 0.8 12/08/2020       Lab Results   Component Value Date    ALBUMIN 2.6 12/08/2020     Lab Results   Component Value Date    PROTTOTAL 7.2 12/08/2020      Lab Results   Component Value Date    ALKPHOS 146 12/08/2020       Lab Results   Component Value Date    INR 1.23 11/25/2020     US ABDOMEN COMPLETE:   12/5/2020:                                                                    Impression:   1.  Normal Doppler ultrasound of the hepatic vessels.  2.  Trace free fluid in the pelvis.

## 2020-12-09 NOTE — LETTER
12/9/2020         RE: Susan Puckett  [address] [address]        Dear Colleague,    Thank you for referring your patient, Susan Puckett, to the [facility] [place]. Please see a copy of my visit note below.    Hepatology Follow-up Clinic note  Susan Puckett   Date of Birth 1970  Date of Service 12/9/2020    Reason for follow-up: CUEOT cirrhosis          Assessment/plan:   Susan Puckett is a 50 year old female with history of CUETO cirrhosis, complicated by history of ascites, now controlled with diuretics, and recent hospitalizations (X2)  for hepatic encephalopathy with in the past few months. Most recent MELD-Na 9 (INR 1.2, Cr. 1.05) and her albumin was down to 2/6. Her transaminases have normalized over the past few months. She was very tearful today talking about her quality of life and inability to work due to her cognition and frequent restroom breaks. We spent a lot of time today discuss the natural history of cirrhosis. We also discussed optimizing her nutrition,  verall strength and mental health in the setting of chronic disease. She is up date on HCC and variceal screening. Recommend that she follow with Dr. Cook to discuss risk/benefit of liver transplant further.     # History ascites, controlled with diuretics:   - Continue 200 mg spironolactone daily   - Restart 40 mg twice daily   - Repeat BMP in one week   - high protein diet (60-70 gm/day) and 2000 mg sodium diet     # History of hepatic enpcehalopathy  - Continue lactulose (titrate dose to 3-5 bowel movements a day)  - Continue Rifaximin 550 mg twice daily    # Recommend optimization of her mental health with chronic disease management and depression     # Continue optimization of blood glucose levels   # Avoid alcohol     # Follow-up in clinic with Dr. Cook in 3 months     Shahzad Hatch PA-C   [facility] Hepatology  clinic    -----------------------------------------------------       HPI:   Susan Puckett is a 50 year old female presenting for follow-up.     ETOH Cirrhosis  Complicated by:  - Ascites  - History of HE   - No history of GI Bleed  EGD: 8/25/2020   HCC: 12/5/2020     Patient was last seen by Dr. Cook on 9/28/2020. She was recently hospitalized for hepatic encephalopathy, who had been experiencing progressive fatigue prior to admission.      Last paracentesis was in June and last paracentesis was in July. Currently on spironolactone - 100 mg twice daily. Her furosemide was held during recent hospitalization. She was previously taking. Lasix - 40 mg and 40 mg.     She is taking 3 doses of lactulose most days and having 4-5 bowel movements. Just got two teeth pulled a few weeks ago. Heightened since of smell since July/August.     Patient denies jaundice, lower extremity edema. Patient also denies melena, hematochezia or hematemesis.  Patient denies fevers, sweats or chills.    No history of significant alcohol use. Has not been able work for awhile and this has been very difficult for her. Admits to being very depressed of her current situation.     Medical hx Surgical hx   Past Medical History:   Diagnosis Date     Depressive disorder      Diabetes (H)      History of blood transfusion      Liver cirrhosis secondary to CUETO (H) 05/28/2020     Thyroid disease     Past Surgical History:   Procedure Laterality Date     APPENDECTOMY      1989     COLONOSCOPY      1/2020 at [facility]      ENT SURGERY       GI SURGERY      EGD August 2020 at [facility]      GYN SURGERY      2010     RNY gastric bypass  01/2006    retoocolic, retrogastric, [facility]     VASCULAR SURGERY                   Medications:     Current Outpatient Medications   Medication     atorvastatin (LIPITOR) 20 MG tablet     calcium carbonate (TUMS) 500 MG chewable tablet     calcium citrate-vitamin D (CITRACAL W/D) 250-100 MG-UNIT tablet      cholecalciferol (VITAMIN D-1000 MAX ST) 25 MCG (1000 UT) TABS     cyanocobalamin (VITAMIN B-12) 1000 MCG tablet     Ferrous Sulfate (IRON) 325 (65 FE) MG tablet     folic acid (FOLVITE) 1 MG tablet     lactulose (CEPHULAC) 10 GM packet     levothyroxine (SYNTHROID/LEVOTHROID) 175 MCG tablet     multivitamin (DEKAS ESSENTIAL) capsule     omeprazole (PRILOSEC) 20 MG DR capsule     ondansetron (ZOFRAN-ODT) 4 MG ODT tab     prochlorperazine (COMPAZINE) 5 MG tablet     rifaximin (XIFAXAN) 550 MG TABS tablet     sitagliptin (JANUVIA) 100 MG tablet     spironolactone (ALDACTONE) 100 MG tablet     SUMAtriptan (IMITREX) 50 MG tablet     traZODone (DESYREL) 100 MG tablet     valACYclovir (VALTREX) 1000 mg tablet     venlafaxine (EFFEXOR) 75 MG tablet     vitamin A 3 MG (15537 UNITS) capsule     vitamin C (ASCORBIC ACID) 100 MG tablet     vitamin D2 (ERGOCALCIFEROL) 31039 units (1250 mcg) capsule     vitamin E (TOCOPHEROL) 400 units (180 mg) capsule     Vitamin K, Phytonadione, 100 MCG TABS     No current facility-administered medications for this visit.             Allergies:     Allergies   Allergen Reactions     Prednisone      Nsaids Other (See Comments)            Review of Systems:   10 points ROS was obtained and highlighted in the HPI, otherwise negative.          Physical Exam:     GENERAL: healthy, alert and no distress  EYES: Eyes grossly normal to inspection, conjunctivae and sclerae normal  RESP: no audible wheeze, cough, or visible cyanosis.  No visible retractions or increased work of breathing.  Able to speak fully in complete sentences  NEURO: Cranial nerves grossly intact, mentation intact and speech normal  PSYCH: mentation appears normal, judgement and insight intact, normal speech and appearance well-groomed, affect tearful          Data:   Reviewed in person and significant for:    Lab Results   Component Value Date     12/08/2020      Lab Results   Component Value Date    POTASSIUM 3.9 12/08/2020  "    Lab Results   Component Value Date    CHLORIDE 102 12/08/2020     Lab Results   Component Value Date    CO2 25 12/08/2020     Lab Results   Component Value Date    BUN 9 12/08/2020     Lab Results   Component Value Date    CR 0.99 12/08/2020       Lab Results   Component Value Date    WBC 3.8 12/08/2020     Lab Results   Component Value Date    HGB 10.7 12/08/2020     Lab Results   Component Value Date    HCT 32.5 12/08/2020     Lab Results   Component Value Date     12/08/2020     Lab Results   Component Value Date     12/08/2020       Lab Results   Component Value Date    AST 36 12/08/2020     Lab Results   Component Value Date    ALT 29 12/08/2020     No results found for: BILICONJ   Lab Results   Component Value Date    BILITOTAL 0.8 12/08/2020       Lab Results   Component Value Date    ALBUMIN 2.6 12/08/2020     Lab Results   Component Value Date    PROTTOTAL 7.2 12/08/2020      Lab Results   Component Value Date    ALKPHOS 146 12/08/2020       Lab Results   Component Value Date    INR 1.23 11/25/2020     US ABDOMEN COMPLETE:   12/5/2020:                                                                    Impression:   1.  Normal Doppler ultrasound of the hepatic vessels.  2.  Trace free fluid in the pelvis.    Patient was called and message left stating I would be calling back. Natasha Steward on 12/9/2020 at 10:02 AM    Susan Puckett is a 50 year old female who is being evaluated via a billable video visit.      The patient has been notified of following:     \"This video visit will be conducted via a call between you and your physician/provider. We have found that certain health care needs can be provided without the need for an in-person physical exam.  This service lets us provide the care you need with a video conversation.  If a prescription is necessary we can send it directly to your pharmacy.  If lab work is needed we can place an order for that and you can then stop by our lab to have " "the test done at a later time.    Video visits are billed at different rates depending on your insurance coverage.  Please reach out to your insurance provider with any questions.    If during the course of the call the physician/provider feels a video visit is not appropriate, you will not be charged for this service.\"    Patient has given verbal consent for Video visit? Yes  How would you like to obtain your AVS? MyChart  If you are dropped from the video visit, the video invite should be resent to: [phone]  Will anyone else be joining your video visit? NO        Video-Visit Details    Type of service:  Video Visit    Video Start Time: 10:25 am   Video End Time: 11:21 am     Originating Location (pt. Location):Home    Distant Location (provider location):  [facility] [place]     Platform used for Video Visit: Holly Hatch PA-C        "

## 2020-12-09 NOTE — PROGRESS NOTES
"Patient was called and message left stating I would be calling back. Natasha Steward on 12/9/2020 at 10:02 AM    Susan Puckett is a 50 year old female who is being evaluated via a billable video visit.      The patient has been notified of following:     \"This video visit will be conducted via a call between you and your physician/provider. We have found that certain health care needs can be provided without the need for an in-person physical exam.  This service lets us provide the care you need with a video conversation.  If a prescription is necessary we can send it directly to your pharmacy.  If lab work is needed we can place an order for that and you can then stop by our lab to have the test done at a later time.    Video visits are billed at different rates depending on your insurance coverage.  Please reach out to your insurance provider with any questions.    If during the course of the call the physician/provider feels a video visit is not appropriate, you will not be charged for this service.\"    Patient has given verbal consent for Video visit? Yes  How would you like to obtain your AVS? MyChart  If you are dropped from the video visit, the video invite should be resent to: [phone]  Will anyone else be joining your video visit? NO        Video-Visit Details    Type of service:  Video Visit    Video Start Time: 10:25 am   Video End Time: 11:21 am     Originating Location (pt. Location):Home    Distant Location (provider location):  [facility] [place]     Platform used for Video Visit: Holly Hatch PA-C        "

## 2020-12-14 ENCOUNTER — TELEPHONE (OUTPATIENT)
Dept: TRANSPLANT | Facility: CLINIC | Age: 50
End: 2020-12-14

## 2020-12-14 DIAGNOSIS — E55.9 VITAMIN D DEFICIENCY: Primary | ICD-10-CM

## 2020-12-14 RX ORDER — CHOLECALCIFEROL (VITAMIN D3) 50 MCG
1 TABLET ORAL DAILY
Qty: 90 TABLET | Refills: 3 | Status: ON HOLD | OUTPATIENT
Start: 2020-12-14 | End: 2021-12-28

## 2020-12-14 NOTE — TELEPHONE ENCOUNTER
I received an e-mail from Mrs. Puckett regarding status of manuel assistance from [facility]. I send an e-mail to our accounts payable to inquire about status. I communicated to Mrs. Puckett that I would inform her of any information.    RENARD Coronado, [facility]  Liver Transplant   [facility]  Phone: [phone]  Pager: [phone]    
The patient is a 42y Female complaining of numbness.

## 2020-12-14 NOTE — TELEPHONE ENCOUNTER
Patient Call: Susan needs lab order faxed to Dr. Billy' office [facility] Fax# [phone]  Please call Susan to let her know  She was DC'd from the [facility] and needs to get her labs drawn          Call back needed? Yes    Return Call Needed  Same as documented in contacts section  When to return call?: Same day: Route High Priority

## 2020-12-15 ENCOUNTER — PATIENT OUTREACH (OUTPATIENT)
Dept: GASTROENTEROLOGY | Facility: CLINIC | Age: 50
End: 2020-12-15

## 2020-12-15 ENCOUNTER — COMMITTEE REVIEW (OUTPATIENT)
Dept: TRANSPLANT | Facility: CLINIC | Age: 50
End: 2020-12-15

## 2020-12-15 ENCOUNTER — VIRTUAL VISIT (OUTPATIENT)
Dept: BEHAVIORAL HEALTH | Facility: CLINIC | Age: 50
End: 2020-12-15
Payer: COMMERCIAL

## 2020-12-15 DIAGNOSIS — F33.1 MAJOR DEPRESSIVE DISORDER, RECURRENT EPISODE, MODERATE (H): Primary | ICD-10-CM

## 2020-12-15 DIAGNOSIS — F41.1 GENERALIZED ANXIETY DISORDER: ICD-10-CM

## 2020-12-15 PROCEDURE — 90832 PSYTX W PT 30 MINUTES: CPT | Mod: 95 | Performed by: PSYCHOLOGIST

## 2020-12-15 NOTE — COMMITTEE REVIEW
Abdominal Patient Discussion Note Transplant Coordinator: Daja Suarez  Transplant Surgeon:   Mil Tejada    Referring Physician: Rivera Dowling    Committee Review Members:  Hepatology Lacy Burnette MD   Nutrition Kelin Mcginnis, RD   Pharmacy Efrain Licona, [facility], Zach Conde, [facility]    - Clinical Dat Iglesias, [facility], Juanita Wells, [facility]   Transplant Gaurang Abreu MD, Hazel Jean, RN, Yenni Cristina, AJAY, Brandi Nichols, AJAY, Raphael Cook MD, Jr Yves Briseno, AJAY, Che Burgess MD, Daja Suarez, RN, Raymundo Gutierrez MD, Yoandy Sadler MD, Alexei Alcaraz MD, Thomas M. Leventhal, MD, Mil Tejada MD, Himanshu Farias MD       Additional Discussion Notes and Findings:   Patient should follow up with Dr. Cook, hepatology,   Then assess if proceeding with live liver donor work ups

## 2020-12-15 NOTE — PROGRESS NOTES
Pt is a 50 year old female with PMH significant for cirrhosis (most likely caused by CUETO) c/b hepatic encephalopathy, ascites, hepatic hydrothorax, and esophageal varices. Pt was evaluated for living donor liver transplant and approved pending options for donor.     Pt was recently hospitalized (12/4-12/8) for hepatic encephalopathy thought to be due to inadequate lactulose dosing. Pt discharge on lactulose 30 grams 6 times daily. Furosemide was also held on discharge due to GEORGETTE.    Pt had hospital follow up appointment with Shahzad Hatch on 12/9 and plan per Shahzad:  1) Restart furosemide 40 mg twice daily  2) Continue spironolactone 200 mg daily  3) Repeat BMP in 1 week  4) High protein diet (60-70 gm/day) and 2000 mg sodium diet  5) Continue lactulose (titrate to achieve 3-5 BMs per day)  6) Continue rifaximin 550 mg twice daily  7) Follow up with Dr. Cook in 3 months    Connected with pt to introduce self and RN care coordinator role. Pt stated that she has had a lot of appointments since discharge from the hospital and is feeling overwhelmed. Pt had home care start through [facility] and has nursing and OT services. Pt stated that PT evaluated pt but did not think pt needed PT services and provided pt with home exercises. Pt reported feeling weak and lacking upper and lower body strength. Pt is able to ambulate independently. Pt's home care nurse encouraged pt to notify home care team if pt starts feeling weaker and wants to be reassessed by PT.     Reviewed recent diuretic changes and pt confirmed that she is taking spironolactone 200 mg daily and furosemide 40 mg twice daily. Pt's home care nurse is assisting with medication set up. Pt denied ascites, lower extremity edema, and shortness of breath. Pt feels that fluid status is currently controlled. Discussed that pt needs a repeat BMP this week and pt plans to get this done at primary care clinic on 12/17. Writer will look for results in Care  Everywhere.     Pt's biggest concern is lactulose dosing. Pt is trying to stay on track with lactulose but reported feeling very overwhelmed. Provided emotional support and reiterated that lactulose dosing can be stressful, especially at first. Emphasized that pt is not alone and writer is available to help with any questions regarding lactulose dosing.     Reviewed that lactulose dosing is two fold and pt needs to focus on achieving 3-5 BMs per day and also watch for signs and symptoms of hepatic encephalopathy  Encouraged pt to stay on a schedule with lactulose and keep track of bowel movements and symptoms throughout the day. Advised pt to take another dose of lactulose if pt starts experiencing any of the symptoms of hepatic encephalopathy (symptoms reviewed). Pt bought a planner and plans to track lactulose timing, bowel movements, and any symptoms of hepatic encephalopathy. Also discussed that compliance with xifaxan 550 mg twice daily is just as important as lactulose for management of hepatic encephalopathy and pt confirmed that she is taking xifaxan as prescribed.     Pt has been having 3-4 BMs per day with taking lactulose 5 times daily. Pt stated that she only has had 1 BM so far today and is currently on 3rd dose of lactulose. Encouraged pt to take another dose of lactulose at dinner time and at night time to hopefully achieve 3 BMs.     Plan to check in with pt in 1-2 days. Pt verbalized understanding and agreeable to plan of care.     ASCITES: No  - History of SBP: No  - Meets criteria for SBP ppx: No  [History of SBP and/or ascites total protein < 1.5g/dL AND either SCr  >1.2 , Na <130 or TB>3]

## 2020-12-15 NOTE — PROGRESS NOTES
[facility]: Integrated Behavioral Health  December 15, 2020      Behavioral Health Clinician Progress Note    Patient Name: Susan Puckett           Service Type: Individual      Service Location:  Video visit      Session Start Time: 2:11  Session End Time: 2:32      Session Length: 16 - 37      Attendees: Patient    Visit Activities (Refresh list every visit): [facility] Only    Telemedicine Visit: The patient's condition can be safely assessed and treated via synchronous audio and visual telemedicine encounter.      Reason for Telemedicine Visit: Services only offered telehealth    Originating Site (Patient Location): Patient's home    Distant Site (Provider Location): Provider Remote Setting    Consent:  The patient/guardian has verbally consented to: the potential risks and benefits of telemedicine (video visit) versus in person care; bill my insurance or make self-payment for services provided; and responsibility for payment of non-covered services.     Mode of Communication:  Video Conference via [employer]    As the provider I attest to compliance with applicable laws and regulations related to telemedicine.    Diagnostic Assessment Date: completed by Ozzie Augustin MD on 11/5  Treatment Plan Review Date: IP  See Flowsheets for today's PHQ-9 and AILIN-7 results  Previous PHQ-9:   PHQ-9 SCORE 11/5/2020   PHQ-9 Total Score MyChart 10 (Moderate depression)   PHQ-9 Total Score 10     Previous AILIN-7:   AILIN-7 SCORE 11/5/2020   Total Score 10 (moderate anxiety)   Total Score 10       LUIS LEVEL:  LUIS Score (Last Two) 10/18/2020   LUIS Raw Score 35   Activation Score 72.1   LUIS Level 3       DATA  Extended Session (60+ minutes): No  Interactive Complexity: No  Crisis: No    Treatment Objective(s) Addressed in This Session:  Target Behavior(s): disease management/lifestyle changes related to anxiety management    Anxiety: will experience a reduction in anxiety, will develop more effective  coping skills to manage anxiety symptoms, will develop healthy cognitive patterns and beliefs and will increase ability to function adaptively    Current Stressors / Issues:  Susan completed a brief [facility] appointment follow-up today. She indicated having to make our session short, as she just learned of her son's car breaking down and needing to help with this. We talked about how things are going, with transplant and her adjustment to illness. She reported continuing to feel anxious about this and will worry a bit more often than she would like. We talked about adding more coping skills to her routine to help manage her distress. I instructed her how to make use of worry-logs for this purpose. I shared information about the possible benefits of worry-time, delayed worry, and categorazing her fears to better manage her distress. I provided her instructions for this in her AVS. We agreed to meet again in a couple weeks.     Susan inquired about an MATT for her therapist at [facility]. She would like me to coordinate care with them. I explained the possible risks of having two therapists, especially from a billing perspective, and encouraged her to check with her insurance about this. We agreed to talk more about this during our next visit.       Progress on Treatment Objective(s) / Homework:  No improvement - ACTION (Actively working towards change); Intervened by reinforcing change plan / affirming steps taken    Worry-logs, worry time, delayed worry, worry-time instructed today    Care Plan review completed: No    Medication Review:  No changes to current psychiatric medication(s)    Medication Compliance:  Yes    Changes in Health Issues:   Yes: Chronic disease management, Associated Psychological Distress    Chemical Use Review:   Substance Use: Chemical use reviewed, no active concerns identified      Tobacco Use: No current tobacco use.      Assessment: Current Emotional / Mental Status (status of  significant symptoms):  Risk status (Self / Other harm or suicidal ideation)  Patient denies a history of suicidal ideation, suicide attempts, self-injurious behavior, homicidal ideation, homicidal behavior and and other safety concerns     Patient denies current fears or concerns for personal safety.  Patient denies current or recent suicidal ideation or behaviors.  Patient denies current or recent homicidal ideation or behaviors.  Patient denies current or recent self injurious behavior or ideation.  Patient denies other safety concerns.     Trinity Suicide Severity Rating Scale (Short Version)  Trinity Suicide Severity Rating (Short Version) 10/25/2020 11/22/2020 12/4/2020   Over the past 2 weeks have you felt down, depressed, or hopeless? no no no   Over the past 2 weeks have you had thoughts of killing yourself? no no no   Have you ever attempted to kill yourself? no no no       A safety and risk management plan has not been developed at this time, however patient was encouraged to call [facility] should there be a change in any of these risk factors.    Appearance:   Appropriate   Eye Contact:   Good   Psychomotor Behavior: Normal   Attitude:   Cooperative  Friendly Pleasant  Orientation:   All  Speech   Rate / Production: Normal    Volume:  Normal   Mood:    Sad   Affect:    Appropriate   Thought Content:  Clear   Thought Form:  Coherent  Logical   Insight:    Good     Diagnoses:  1. Major depressive disorder, recurrent episode, moderate (H)    2. Generalized anxiety disorder        Collateral Reports Completed:  Not Applicable    Plan: (Homework, other):  Patient was given information about behavioral services and encouraged to schedule a follow up appointment with the clinic [facility] in 2 weeks.  She was also given Cognitive Behavioral Therapy skills to practice when experiencing anxiety.  CD Recommendations: No indications of CD issues. Sancho Gaines, ALEXIS

## 2020-12-17 ENCOUNTER — PATIENT OUTREACH (OUTPATIENT)
Dept: GASTROENTEROLOGY | Facility: CLINIC | Age: 50
End: 2020-12-17

## 2020-12-17 DIAGNOSIS — R18.8 CIRRHOSIS OF LIVER WITH ASCITES, UNSPECIFIED HEPATIC CIRRHOSIS TYPE (H): Primary | ICD-10-CM

## 2020-12-17 DIAGNOSIS — K74.60 CIRRHOSIS OF LIVER WITH ASCITES, UNSPECIFIED HEPATIC CIRRHOSIS TYPE (H): Primary | ICD-10-CM

## 2020-12-17 RX ORDER — FUROSEMIDE 40 MG
40 TABLET ORAL 2 TIMES DAILY
Qty: 60 TABLET | Refills: 0
Start: 2020-12-17 | End: 2021-07-06

## 2020-12-17 NOTE — PATIENT INSTRUCTIONS
"Worry Time: Set aside a 15-20 minute worry period that will take place at the same time and place each day. During this period, using this worry log can help reduce the intensity of worry. The goal is not to get rid of worry altogether, but help reduce its intensity.     How to use the worry log below: Record all your worries during the worry time and take a moment to categorize them. You can choose categories that are helpful to you, such as \"family concerns,\" \"health concerns,\" or \"financial concerns.\" Any means of categorizing worries can be used, but try to limit the number of categories to three to seven. For the \"management strategy section, try to think of ways you could deal or cope with the worry or think more rationally about your concern. I've included an example below.                                                         Worry Log     Worrisome Thought: (e.g. I'll never quit smoking, its way too difficult\")     Category: (e.g. health concern)     Management Strategy: (e.g. Quitting smoking is hard, but not impossible. I can try looking into online resources or ask my doctor about strategies or medications to help).         Warmly,     Sancho Gaines, LP   "

## 2020-12-17 NOTE — PROGRESS NOTES
Pt had BMP drawn at PCP appointment today, 12/17. Reviewed lab results with Shahzad Htach and, per Shahzad, pt should continue diuretics at current doses: spironolactone 200 mg daily and furosemide 40 mg twice daily.     Reviewed lab results and plan to continue diuretics at current dosages. Pt verbalized understanding.     Also discussed lactulose dosing. Pt stated that lactulose dosing was getting a little better. Pt took lactulose 4 times on 12/16 and had 3 BMs. Pt has taken lactulose 3 times so far today and has had 2 BMs. Denied experiencing any confusion.     Plan to check in with pt in 1 week. Encouraged pt to call with any questions or concerns.

## 2020-12-21 ENCOUNTER — TELEPHONE (OUTPATIENT)
Dept: TRANSPLANT | Facility: CLINIC | Age: 50
End: 2020-12-21

## 2020-12-21 NOTE — TELEPHONE ENCOUNTER
Spoke with the patient and confirmed Hepatology appointments on 12/22/20.  Informed patient an itinerary can be accessed on [employer].

## 2020-12-22 ENCOUNTER — VIRTUAL VISIT (OUTPATIENT)
Dept: GASTROENTEROLOGY | Facility: CLINIC | Age: 50
End: 2020-12-22
Attending: INTERNAL MEDICINE
Payer: COMMERCIAL

## 2020-12-22 DIAGNOSIS — K74.60 CIRRHOSIS OF LIVER WITH ASCITES, UNSPECIFIED HEPATIC CIRRHOSIS TYPE (H): Primary | ICD-10-CM

## 2020-12-22 DIAGNOSIS — R18.8 CIRRHOSIS OF LIVER WITH ASCITES, UNSPECIFIED HEPATIC CIRRHOSIS TYPE (H): Primary | ICD-10-CM

## 2020-12-22 PROCEDURE — 99214 OFFICE O/P EST MOD 30 MIN: CPT | Mod: 95 | Performed by: INTERNAL MEDICINE

## 2020-12-22 ASSESSMENT — PAIN SCALES - GENERAL: PAINLEVEL: NO PAIN (0)

## 2020-12-22 NOTE — LETTER
"    12/22/2020         RE: Susan Puckett  [address] [address]        Dear Colleague,    Thank you for referring your patient, Susan Puckett, to the [facility] [place]. Please see a copy of my visit note below.    Susan Puckett is a 50 year old female who is being evaluated via a billable video visit.      The patient has been notified of following:     \"This video visit will be conducted via a call between you and your physician/provider. We have found that certain health care needs can be provided without the need for an in-person physical exam.  This service lets us provide the care you need with a video conversation.  If a prescription is necessary we can send it directly to your pharmacy.  If lab work is needed we can place an order for that and you can then stop by our lab to have the test done at a later time.    Video visits are billed at different rates depending on your insurance coverage.  Please reach out to your insurance provider with any questions.    If during the course of the call the physician/provider feels a video visit is not appropriate, you will not be charged for this service.\"    Patient has given verbal consent for Video visit? No  How would you like to obtain your AVS? Mail a copy    Send [employer] link to [phone]    Will anyone else be joining your video visit? No        Video-Visit Details    I had the pleasure of seeing Susan Puckett for follow-up in the liver transplant clinic at the [facility] via video visit on December 22, 2020.  At present though she seems to be doing fairly well.  She does complain of some abdominal discomfort, which is fairly mild.  She does complain of itching which she attributes to very dry skin.  She also complains of dry mouth.  She denies any increased abdominal girth or lower extremity edema.  She has never had gastrointestinal bleeding and currently has no signs of hepatic " encephalopathy.     She denies any fevers or chills, cough or shortness of breath.  She does note some dyspnea on exertion.  She does note some chronic nausea and occasional vomiting.  She states her appetite is poor and she is trying to maintain her weight.     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    Current Outpatient Medications   Medication     atorvastatin (LIPITOR) 20 MG tablet     calcium carbonate (TUMS) 500 MG chewable tablet     calcium citrate-vitamin D (CITRACAL W/D) 250-100 MG-UNIT tablet     cholecalciferol (VITAMIN D-1000 MAX ST) 25 MCG (1000 UT) TABS     cyanocobalamin (VITAMIN B-12) 1000 MCG tablet     Ferrous Sulfate (IRON) 325 (65 FE) MG tablet     folic acid (FOLVITE) 1 MG tablet     furosemide (LASIX) 40 MG tablet     lactulose 20 GM/30ML SOLN     levothyroxine (SYNTHROID/LEVOTHROID) 175 MCG tablet     multivitamin (DEKAS ESSENTIAL) capsule     omeprazole (PRILOSEC) 20 MG DR capsule     ondansetron (ZOFRAN-ODT) 4 MG ODT tab     prochlorperazine (COMPAZINE) 5 MG tablet     rifaximin (XIFAXAN) 550 MG TABS tablet     sitagliptin (JANUVIA) 100 MG tablet     spironolactone (ALDACTONE) 100 MG tablet     SUMAtriptan (IMITREX) 50 MG tablet     traZODone (DESYREL) 100 MG tablet     valACYclovir (VALTREX) 1000 mg tablet     venlafaxine (EFFEXOR) 75 MG tablet     vitamin A 3 MG (90816 UNITS) capsule     vitamin C (ASCORBIC ACID) 100 MG tablet     vitamin D2 (ERGOCALCIFEROL) 59331 units (1250 mcg) capsule     vitamin D3 (CHOLECALCIFEROL) 50 mcg (2000 units) tablet     vitamin E (TOCOPHEROL) 400 units (180 mg) capsule     Vitamin K, Phytonadione, 100 MCG TABS     No current facility-administered medications for this visit.      On physical examination, she looks well.  HEENT exam shows no scleral icterus or temporal muscle wasting.  Neurologic exam shows no asterixis.    Her most recent laboratory tests are from December 8 and showed her white  count was 3.8, hemoglobin is 10.7 and platelets are 110,000.  Sodium was 135, potassium 3.9, BUN is 9 and creatinine 0.99.  AST is 36, ALT is 29 and alkaline phosphatase is 146.  Her albumin is 2.6 with a total protein of 7.2.    My impression is that Ms. Puckett has a cirrhosis most likely on the basis of nonalcoholic fatty liver disease.  Her last MELD score calculates to be 9.  Her quality life is really quite poor and I think she may benefit from liver transplantation.  With that low MELDscore, she would likely need to undergo live donor liver transplantation, which she was aware of.      She is otherwise up-to-date with regard to variceal and cancer screening.      I did discuss COVID-19 with her.  I did point out that she'll be at substantially increased risk for more severe disease if she were to contract the infection.  Thus that she should be a priority for the vaccine.  I will see her back in the clinic again in 3 months.      Thank you very much for allowing me to participate in the care of this patient.  If you have any questions regarding my recommendations, please do not hesitate to contact me.         Raphael Cook MD      Professor of Medicine  [employer]      Executive Medical Director, Solid Organ Transplant Program  [facility]     Type of service:  Video Visit    Video Start Time: 11:55 AM  Video End Time: 12:18 PM    Originating Location (pt. Location):     Distant Location (provider location):  [facility] [place]     Platform used for Video Visit: [employer]            Again, thank you for allowing me to participate in the care of your patient.        Sincerely,        Raphael Cook MD

## 2020-12-22 NOTE — PROGRESS NOTES
"Susan Puckett is a 50 year old female who is being evaluated via a billable video visit.      The patient has been notified of following:     \"This video visit will be conducted via a call between you and your physician/provider. We have found that certain health care needs can be provided without the need for an in-person physical exam.  This service lets us provide the care you need with a video conversation.  If a prescription is necessary we can send it directly to your pharmacy.  If lab work is needed we can place an order for that and you can then stop by our lab to have the test done at a later time.    Video visits are billed at different rates depending on your insurance coverage.  Please reach out to your insurance provider with any questions.    If during the course of the call the physician/provider feels a video visit is not appropriate, you will not be charged for this service.\"    Patient has given verbal consent for Video visit? No  How would you like to obtain your AVS? Mail a copy    Send [employer] link to [phone]    Will anyone else be joining your video visit? No        Video-Visit Details    I had the pleasure of seeing Susan Puckett for follow-up in the liver transplant clinic at the [facility] via video visit on December 22, 2020.  At present though she seems to be doing fairly well.  She does complain of some abdominal discomfort, which is fairly mild.  She does complain of itching which she attributes to very dry skin.  She also complains of dry mouth.  She denies any increased abdominal girth or lower extremity edema.  She has never had gastrointestinal bleeding and currently has no signs of hepatic encephalopathy.     She denies any fevers or chills, cough or shortness of breath.  She does note some dyspnea on exertion.  She does note some chronic nausea and occasional vomiting.  She states her appetite is poor and she is trying to maintain her weight.     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    Current Outpatient Medications   Medication     atorvastatin (LIPITOR) 20 MG tablet     calcium carbonate (TUMS) 500 MG chewable tablet     calcium citrate-vitamin D (CITRACAL W/D) 250-100 MG-UNIT tablet     cholecalciferol (VITAMIN D-1000 MAX ST) 25 MCG (1000 UT) TABS     cyanocobalamin (VITAMIN B-12) 1000 MCG tablet     Ferrous Sulfate (IRON) 325 (65 FE) MG tablet     folic acid (FOLVITE) 1 MG tablet     furosemide (LASIX) 40 MG tablet     lactulose 20 GM/30ML SOLN     levothyroxine (SYNTHROID/LEVOTHROID) 175 MCG tablet     multivitamin (DEKAS ESSENTIAL) capsule     omeprazole (PRILOSEC) 20 MG DR capsule     ondansetron (ZOFRAN-ODT) 4 MG ODT tab     prochlorperazine (COMPAZINE) 5 MG tablet     rifaximin (XIFAXAN) 550 MG TABS tablet     sitagliptin (JANUVIA) 100 MG tablet     spironolactone (ALDACTONE) 100 MG tablet     SUMAtriptan (IMITREX) 50 MG tablet     traZODone (DESYREL) 100 MG tablet     valACYclovir (VALTREX) 1000 mg tablet     venlafaxine (EFFEXOR) 75 MG tablet     vitamin A 3 MG (67925 UNITS) capsule     vitamin C (ASCORBIC ACID) 100 MG tablet     vitamin D2 (ERGOCALCIFEROL) 93337 units (1250 mcg) capsule     vitamin D3 (CHOLECALCIFEROL) 50 mcg (2000 units) tablet     vitamin E (TOCOPHEROL) 400 units (180 mg) capsule     Vitamin K, Phytonadione, 100 MCG TABS     No current facility-administered medications for this visit.      On physical examination, she looks well.  HEENT exam shows no scleral icterus or temporal muscle wasting.  Neurologic exam shows no asterixis.    Her most recent laboratory tests are from December 8 and showed her white count was 3.8, hemoglobin is 10.7 and platelets are 110,000.  Sodium was 135, potassium 3.9, BUN is 9 and creatinine 0.99.  AST is 36, ALT is 29 and alkaline phosphatase is 146.  Her albumin is 2.6 with a total protein of 7.2.    My impression is that Ms. Puckett has a  cirrhosis most likely on the basis of nonalcoholic fatty liver disease.  Her last MELD score calculates to be 9.  Her quality life is really quite poor and I think she may benefit from liver transplantation.  With that low MELDscore, she would likely need to undergo live donor liver transplantation, which she was aware of.      She is otherwise up-to-date with regard to variceal and cancer screening.      I did discuss COVID-19 with her.  I did point out that she'll be at substantially increased risk for more severe disease if she were to contract the infection.  Thus that she should be a priority for the vaccine.  I will see her back in the clinic again in 3 months.      Thank you very much for allowing me to participate in the care of this patient.  If you have any questions regarding my recommendations, please do not hesitate to contact me.         Raphael Cook MD      Professor of Medicine  [employer]      Executive Medical Director, Solid Organ Transplant Program  [facility]     Type of service:  Video Visit    Video Start Time: 11:55 AM  Video End Time: 12:18 PM    Originating Location (pt. Location):     Distant Location (provider location):  [facility] [place]     Platform used for Video Visit: Patreon

## 2020-12-24 ENCOUNTER — PATIENT OUTREACH (OUTPATIENT)
Dept: GASTROENTEROLOGY | Facility: CLINIC | Age: 50
End: 2020-12-24

## 2020-12-24 NOTE — TELEPHONE ENCOUNTER
Pt had hepatology follow up on 12/22. No changes to plan of care per Dr. Cook. Attempted to reach patient for check in, no answer, message left requesting call back, number provided.

## 2020-12-29 ENCOUNTER — DOCUMENTATION ONLY (OUTPATIENT)
Dept: TRANSPLANT | Facility: CLINIC | Age: 50
End: 2020-12-29

## 2020-12-29 DIAGNOSIS — F41.1 GENERALIZED ANXIETY DISORDER: ICD-10-CM

## 2020-12-29 DIAGNOSIS — F33.1 MODERATE EPISODE OF RECURRENT MAJOR DEPRESSIVE DISORDER (H): ICD-10-CM

## 2020-12-29 NOTE — PROGRESS NOTES
Appointment 12/22/20 with Dr. Cook and confirmed plan to proceed with live donor work ups due to patient poor quality of life.

## 2021-01-04 ENCOUNTER — TELEPHONE (OUTPATIENT)
Dept: TRANSPLANT | Facility: CLINIC | Age: 51
End: 2021-01-04

## 2021-01-04 RX ORDER — VENLAFAXINE 75 MG/1
75 TABLET ORAL AT BEDTIME
Qty: 30 TABLET | Refills: 0 | OUTPATIENT
Start: 2021-01-04

## 2021-01-04 NOTE — TELEPHONE ENCOUNTER
I received the following e-mail from Susan:     I am currently having virtual appts. with a thera[ist with [facility]. I am also seeing Sancho WALTERS - [place] with the Liver transplant clinic. I want to make it available for the 2 theapists to connect with one another. I do have a form from [employer]. Can you hlelp with the 1 from the Transplant clinic   I have an appt. with [facility] and Sancho on Wed.    I sent Susan a release of information to her via e-mail and asked her to fill out and she can return to me    Plan: Waiting for release of information. Dr. Sancho Gaines informed.     RENARD Coronado, [facility]  Liver Transplant   [facility]  Phone: [phone]  Pager: [phone]

## 2021-01-04 NOTE — TELEPHONE ENCOUNTER
Refill denied  Pharmacy notified -Pt consulted with Dr. Augustin x1 -no longer under his care.. med fu with primary

## 2021-01-05 NOTE — TELEPHONE ENCOUNTER
Pt reported increased abdominal distension and discomfort. Denied nausea and pt is having 3-4 BMs with lactulose regimen (lactulose 30 ml 3-4 times daily). Pt stated that she thinks it is from gas. Discussed that a common side effect of lactulose is abdominal cramping and flatulence. Message sent to Dr. Cook and Shahzad Hatch reviewing pt's symptoms and requesting suggestions for managing gas discomfort. Plan to follow up with pt with any additional recommendations.

## 2021-01-05 NOTE — TELEPHONE ENCOUNTER
Per Shahzad Hatch, pt could cut the dose of lactulose in half and start with 1 capful of MiraLax a day and then adjust MiraLax so pt is still having the 3-4 BMs/day.     Called pt to review recommendations and pt plans to start taking lactulose 30 ml 2 times daily and miralax 17 grams once daily to see if this helps with gas discomfort. Emphasized the importance of pt titrating lactulose and miralax to have at least 3 BMs per day. Pt verbalized understanding and is agreeable to plan of care.

## 2021-01-06 ENCOUNTER — VIRTUAL VISIT (OUTPATIENT)
Dept: BEHAVIORAL HEALTH | Facility: CLINIC | Age: 51
End: 2021-01-06
Payer: COMMERCIAL

## 2021-01-06 DIAGNOSIS — F41.1 GENERALIZED ANXIETY DISORDER: ICD-10-CM

## 2021-01-06 DIAGNOSIS — F33.1 MODERATE EPISODE OF RECURRENT MAJOR DEPRESSIVE DISORDER (H): Primary | ICD-10-CM

## 2021-01-06 PROCEDURE — 90834 PSYTX W PT 45 MINUTES: CPT | Mod: 95 | Performed by: PSYCHOLOGIST

## 2021-01-06 NOTE — PROGRESS NOTES
[facility]: Integrated Behavioral Health  January 6, 2021      Behavioral Health Clinician Progress Note    Patient Name: Susan Puckett           Service Type: Individual      Service Location:  Video visit      Session Start Time: 2:05  Session End Time: 2:50      Session Length: 38 - 52      Attendees: Patient    Visit Activities (Refresh list every visit): [facility] Only    Telemedicine Visit: The patient's condition can be safely assessed and treated via synchronous audio and visual telemedicine encounter.      Reason for Telemedicine Visit: Services only offered telehealth    Originating Site (Patient Location): Patient's home    Distant Site (Provider Location): Provider Remote Setting    Consent:  The patient/guardian has verbally consented to: the potential risks and benefits of telemedicine (video visit) versus in person care; bill my insurance or make self-payment for services provided; and responsibility for payment of non-covered services.     Mode of Communication:  Video Conference via [employer]    As the provider I attest to compliance with applicable laws and regulations related to telemedicine.    Diagnostic Assessment Date: completed by Ozzie Augustin MD on 11/5  Treatment Plan Review Date: IP  See Flowsheets for today's PHQ-9 and AILIN-7 results  Previous PHQ-9:   PHQ-9 SCORE 11/5/2020   PHQ-9 Total Score MyChart 10 (Moderate depression)   PHQ-9 Total Score 10     Previous AILIN-7:   AILIN-7 SCORE 11/5/2020   Total Score 10 (moderate anxiety)   Total Score 10       LUIS LEVEL:  LUIS Score (Last Two) 10/18/2020   LUIS Raw Score 35   Activation Score 72.1   LUIS Level 3       DATA  Extended Session (60+ minutes): No  Interactive Complexity: No  Crisis: No    Treatment Objective(s) Addressed in This Session:  Target Behavior(s): disease management/lifestyle changes related to relationship stress and mood changes    Depressed Mood: Decrease frequency and intensity of feeling  "down, depressed, hopeless  Relationship Problems: will address relationship difficulties in a more adaptive manner    Current Stressors / Issues:  Susan stated she had a \"blue\" holiday season. When asked why, we began talking about several of Susan's relationship concerns, particularly with her mother and her  and with some extended family members. Susan shared her mother remarried after her father passed away several years ago and dislikes her . Per her report, her new  is very politically opinionated and mean toward others in the family. Susan said her mother seems trapped as he can be controlling of her actions at times. I inquired about potential elder abuse, to which Susan denied having concerns, but we talked about possible behaviors to look out for if this changes.     Susan also shared that members of her extended family became hostile toward her because of COVID-19 reasons. Susan shared that because of these ongoing relational issues, she has felt very hurt and upset, is unsure what to do. She was tearful during our call. I provided Susan supportive counseling and we talked about ways of speaking up more/setting boundaries with others. We explored assertive communication styles, how to empathize with others, but also assert her position better to help reduce her sense of helplessness at times.     Progress on Treatment Objective(s) / Homework:  Satisfactory progress - ACTION (Actively working towards change); Intervened by reinforcing change plan / affirming steps taken    Supportive and solution-focused counseling emphasized today    Care Plan review completed: No    Medication Review:  No changes to current psychiatric medication(s)    Medication Compliance:  Yes    Changes in Health Issues:   Yes: Chronic disease management, Associated Psychological Distress    Chemical Use Review:   Substance Use: Chemical use reviewed, no active concerns identified      Tobacco Use: No current tobacco use. "      Assessment: Current Emotional / Mental Status (status of significant symptoms):  Risk status (Self / Other harm or suicidal ideation)  Patient denies a history of suicidal ideation, suicide attempts, self-injurious behavior, homicidal ideation, homicidal behavior and and other safety concerns     Patient denies current fears or concerns for personal safety.  Patient denies current or recent suicidal ideation or behaviors.  Patient denies current or recent homicidal ideation or behaviors.  Patient denies current or recent self injurious behavior or ideation.  Patient denies other safety concerns.     Lea Suicide Severity Rating Scale (Short Version)  Lea Suicide Severity Rating (Short Version) 10/25/2020 11/22/2020 12/4/2020   Over the past 2 weeks have you felt down, depressed, or hopeless? no no no   Over the past 2 weeks have you had thoughts of killing yourself? no no no   Have you ever attempted to kill yourself? no no no       A safety and risk management plan has not been developed at this time, however patient was encouraged to call [place] [phone] should there be a change in any of these risk factors.    Appearance:   Appropriate   Eye Contact:   Good   Psychomotor Behavior: Normal   Attitude:   Cooperative  Friendly Pleasant  Orientation:   All  Speech   Rate / Production: Normal    Volume:  Normal   Mood:    Sad   Affect:    Appropriate   Thought Content:  Clear   Thought Form:  Coherent  Logical   Insight:    Good     Diagnoses:  1. Moderate episode of recurrent major depressive disorder (H)    2. Generalized anxiety disorder        Collateral Reports Completed:  Not Applicable    Plan: (Homework, other):  Patient was given information about behavioral services and encouraged to schedule a follow up appointment with the clinic [facility] in 2 weeks.  She was also given Cognitive Behavioral Therapy skills to practice when experiencing relationship problems.  CD Recommendations: No indications  of CD issues. Sancho Gaines, LP

## 2021-01-08 NOTE — TELEPHONE ENCOUNTER
Pt reported decreased abdominal distension and gas discomfort since changing bowel regimen. Pt is currently taking lactulose 30 ml 2 times daily and miralax 17 grams daily. Pt reported having 3 BMs/day and denied confusion. Advised pt to take an additional  dose of lactulose if starts having less than 3 BMs per day and/or starts experiencing increased confusion.     Plan to check in with pt in 1-2 weeks. Pt verbalized understanding and is agreeable to plan of care.

## 2021-01-14 ENCOUNTER — TELEPHONE (OUTPATIENT)
Dept: TRANSPLANT | Facility: CLINIC | Age: 51
End: 2021-01-14

## 2021-01-14 ENCOUNTER — HEALTH MAINTENANCE LETTER (OUTPATIENT)
Age: 51
End: 2021-01-14

## 2021-01-14 ENCOUNTER — PATIENT OUTREACH (OUTPATIENT)
Dept: GASTROENTEROLOGY | Facility: CLINIC | Age: 51
End: 2021-01-14

## 2021-01-14 DIAGNOSIS — K74.60 LIVER CIRRHOSIS SECONDARY TO NASH (H): Primary | ICD-10-CM

## 2021-01-14 DIAGNOSIS — K75.81 LIVER CIRRHOSIS SECONDARY TO NASH (H): Primary | ICD-10-CM

## 2021-01-14 NOTE — TELEPHONE ENCOUNTER
Patient of Dr. Cook's (GI), in eval for LI transplant  Gas and nausea this week, threw up bile yesterday    Feeling more fatigued - reports being exhausted  BM x2 today (taking lactulose) - had discussed with [facility] coordinator earlier & got recommendations re: lactulose & Miralax.  Low urine output today.    Discussed with Dr. Sadler.  No changes - just encourage small frequent bland meals/liquids & follow up with GI tomorrow.  Updated patient.

## 2021-01-14 NOTE — TELEPHONE ENCOUNTER
Called pt to check in and review reported symptoms in [employer]er message. Pt stated that was experiencing a headache and nausea this morning and had an emesis x 1. Pt took her prescribed medication for headaches and ate a banana and is feeling better. Reminded pt that she also has as needed nausea medication to use if nausea does not subside. Encouraged pt to stay hydrated and eat bland food until nausea resolves.     Pt also reported increased abdominal distension this morning. Pt had 5 BMs yesterday, 1/13, with current regimen of lactulose 30 ml 2 times daily and miralax 17 grams once daily. Reviewed that pt can try lactulose 30 ml daily and miralax 2 times daily to see if this helps with gas and abdominal discomfort. Emphasized that pt should take an additional dose of lactulose if pt does not have at least 3 BMs/day and/or starts experiencing increased confusion. Pt verbalized understanding and agreeable to plan of care.

## 2021-01-15 NOTE — TELEPHONE ENCOUNTER
Pt stated that she has a headache again this morning and is feeling more lethargic. Pt had 2 BMs yesterday with taking lactulose 30 ml once daily and mirlax 17 grams twice daily. Pt alert and oriented x 4 and able to have a logical conversation. Reiterated that pt needs to be diligent with bowel regimen and take an additional dose of lactulose if having less than 3 BMs per day and/or starts experiencing any signs/symptoms of hepatic encephalopathy. Plan to check in with pt later this afternoon but encouraged pt to call with any additional questions or concerns. Pt verbalized understanding and agreeable to plan of care.

## 2021-01-15 NOTE — TELEPHONE ENCOUNTER
Pt feels fatigued but not lethargic. Denied confusion. Pt reported only having 1 BM so far today. Pt took lactulose 30 ml x 2 and miralax 17 grams once.  Encouraged pt to stay diligent with lactulose and/or miralax. Pt aware of signs and symptoms of hepatic encephalopathy and plans to go to the emergency room if symptoms worsen despite increased lactulose dosing.

## 2021-01-18 NOTE — TELEPHONE ENCOUNTER
Pt stated that she is taking lactulose 30 ml twice daily and miralax once daily and is having at least 3 BMs again. Pt still has abdominal distension and  feels extremely fatigued. Pt also continues to have intermittent nausea and vomiting. Pt reported taking anti-nausea medication as needed and eating small bland foods with some improvement. Discussed whether abdominal distension could be related to ascites and pt is unsure. Message sent to Dr. Cook reviewing pt's symptoms and writer will reach out to pt with any additional recommendations and/or changes to plan of care. Pt verbalized understanding and agreeable to plan of care.

## 2021-01-19 ENCOUNTER — ANCILLARY PROCEDURE (OUTPATIENT)
Dept: ULTRASOUND IMAGING | Facility: CLINIC | Age: 51
End: 2021-01-19
Attending: INTERNAL MEDICINE
Payer: COMMERCIAL

## 2021-01-19 DIAGNOSIS — K74.60 LIVER CIRRHOSIS SECONDARY TO NASH (H): ICD-10-CM

## 2021-01-19 DIAGNOSIS — K75.81 LIVER CIRRHOSIS SECONDARY TO NASH (H): ICD-10-CM

## 2021-01-19 LAB
ALBUMIN SERPL-MCNC: 2.5 G/DL (ref 3.4–5)
ALP SERPL-CCNC: 96 U/L (ref 40–150)
ALT SERPL W P-5'-P-CCNC: 31 U/L (ref 0–50)
ANION GAP SERPL CALCULATED.3IONS-SCNC: 4 MMOL/L (ref 3–14)
AST SERPL W P-5'-P-CCNC: 37 U/L (ref 0–45)
BILIRUB DIRECT SERPL-MCNC: 0.3 MG/DL (ref 0–0.2)
BILIRUB SERPL-MCNC: 0.6 MG/DL (ref 0.2–1.3)
BUN SERPL-MCNC: 12 MG/DL (ref 7–30)
CALCIUM SERPL-MCNC: 8.2 MG/DL (ref 8.5–10.1)
CHLORIDE SERPL-SCNC: 105 MMOL/L (ref 94–109)
CO2 SERPL-SCNC: 34 MMOL/L (ref 20–32)
CREAT SERPL-MCNC: 1.15 MG/DL (ref 0.52–1.04)
ERYTHROCYTE [DISTWIDTH] IN BLOOD BY AUTOMATED COUNT: 14 % (ref 10–15)
GFR SERPL CREATININE-BSD FRML MDRD: 56 ML/MIN/{1.73_M2}
GLUCOSE SERPL-MCNC: 145 MG/DL (ref 70–99)
HCT VFR BLD AUTO: 31.8 % (ref 35–47)
HGB BLD-MCNC: 10.4 G/DL (ref 11.7–15.7)
INR PPP: 1.09 (ref 0.86–1.14)
MCH RBC QN AUTO: 33.9 PG (ref 26.5–33)
MCHC RBC AUTO-ENTMCNC: 32.7 G/DL (ref 31.5–36.5)
MCV RBC AUTO: 104 FL (ref 78–100)
PLATELET # BLD AUTO: 104 10E9/L (ref 150–450)
POTASSIUM SERPL-SCNC: 3.8 MMOL/L (ref 3.4–5.3)
PROT SERPL-MCNC: 6.7 G/DL (ref 6.8–8.8)
RBC # BLD AUTO: 3.07 10E12/L (ref 3.8–5.2)
SODIUM SERPL-SCNC: 143 MMOL/L (ref 133–144)
WBC # BLD AUTO: 3.7 10E9/L (ref 4–11)

## 2021-01-19 PROCEDURE — 85610 PROTHROMBIN TIME: CPT | Performed by: INTERNAL MEDICINE

## 2021-01-19 PROCEDURE — 80076 HEPATIC FUNCTION PANEL: CPT | Performed by: INTERNAL MEDICINE

## 2021-01-19 PROCEDURE — 76705 ECHO EXAM OF ABDOMEN: CPT | Performed by: STUDENT IN AN ORGANIZED HEALTH CARE EDUCATION/TRAINING PROGRAM

## 2021-01-19 PROCEDURE — 80048 BASIC METABOLIC PNL TOTAL CA: CPT | Performed by: INTERNAL MEDICINE

## 2021-01-19 PROCEDURE — 85027 COMPLETE CBC AUTOMATED: CPT | Performed by: INTERNAL MEDICINE

## 2021-01-19 PROCEDURE — 36415 COLL VENOUS BLD VENIPUNCTURE: CPT | Performed by: INTERNAL MEDICINE

## 2021-01-20 ENCOUNTER — VIRTUAL VISIT (OUTPATIENT)
Dept: BEHAVIORAL HEALTH | Facility: CLINIC | Age: 51
End: 2021-01-20
Payer: COMMERCIAL

## 2021-01-20 DIAGNOSIS — F41.1 GENERALIZED ANXIETY DISORDER: ICD-10-CM

## 2021-01-20 DIAGNOSIS — F33.1 MODERATE EPISODE OF RECURRENT MAJOR DEPRESSIVE DISORDER (H): Primary | ICD-10-CM

## 2021-01-20 PROCEDURE — 90834 PSYTX W PT 45 MINUTES: CPT | Mod: 95 | Performed by: PSYCHOLOGIST

## 2021-01-20 NOTE — TELEPHONE ENCOUNTER
Pt had labs and abdominal ultrasound completed on 1/19. Per Dr. Cook, labs look ok and no significant ascites found on ultrasound.     Reviewed results with pt and discussed continuing current plan of care. Pt verbalized understanding and plans to call writer with any questions or concerns.

## 2021-01-20 NOTE — PROGRESS NOTES
[facility]: Integrated Behavioral Health  January 20, 2021      Behavioral Health Clinician Progress Note    Patient Name: Susan Puckett           Service Type: Individual      Service Location:  Video visit      Session Start Time: 2:05  Session End Time: 2:50      Session Length: 38 - 52      Attendees: Patient    Visit Activities (Refresh list every visit): [facility] Only    Telemedicine Visit: The patient's condition can be safely assessed and treated via synchronous audio and visual telemedicine encounter.      Reason for Telemedicine Visit: Services only offered telehealth    Originating Site (Patient Location): Patient's home    Distant Site (Provider Location): Provider Remote Setting    Consent:  The patient/guardian has verbally consented to: the potential risks and benefits of telemedicine (video visit) versus in person care; bill my insurance or make self-payment for services provided; and responsibility for payment of non-covered services.     Mode of Communication:  Video Conference via in2nite    As the provider I attest to compliance with applicable laws and regulations related to telemedicine.    Diagnostic Assessment Date: completed by Ozzie Augustin MD on 11/5  Treatment Plan Review Date:4/20/2021  See Flowsheets for today's PHQ-9 and AILIN-7 results  Previous PHQ-9:   PHQ-9 SCORE 11/5/2020   PHQ-9 Total Score MyChart 10 (Moderate depression)   PHQ-9 Total Score 10     Previous AILIN-7:   AILIN-7 SCORE 11/5/2020   Total Score 10 (moderate anxiety)   Total Score 10       LUIS LEVEL:  LUIS Score (Last Two) 10/18/2020   LUIS Raw Score 35   Activation Score 72.1   LUIS Level 3       DATA  Extended Session (60+ minutes): No  Interactive Complexity: No  Crisis: No    Treatment Objective(s) Addressed in This Session:  Target Behavior(s): disease management/lifestyle changes related to relationship stress and mood changes    Depressed Mood: Decrease frequency and intensity of  feeling down, depressed, hopeless  Relationship Problems: will address relationship difficulties in a more adaptive manner    Current Stressors / Issues:  Susan stated she is doing better than during our last visit. Her current stressors include her ongoing participation in the transplant process and some concerns with family. Her social support is reportedly good, thought we talked a bit about some relationship concerns she is having. Continued to provide supportive and insight-oriented counseling as needed.     Progress on Treatment Objective(s) / Homework:  Satisfactory progress - ACTION (Actively working towards change); Intervened by reinforcing change plan / affirming steps taken    Supportive and solution-focused counseling emphasized today    Care Plan review completed: No    Medication Review:  No changes to current psychiatric medication(s)    Medication Compliance:  Yes    Changes in Health Issues:   Yes: Chronic disease management, Associated Psychological Distress    Chemical Use Review:   Substance Use: Chemical use reviewed, no active concerns identified      Tobacco Use: No current tobacco use.      Assessment: Current Emotional / Mental Status (status of significant symptoms):  Risk status (Self / Other harm or suicidal ideation)  Patient denies a history of suicidal ideation, suicide attempts, self-injurious behavior, homicidal ideation, homicidal behavior and and other safety concerns     Patient denies current fears or concerns for personal safety.  Patient denies current or recent suicidal ideation or behaviors.  Patient denies current or recent homicidal ideation or behaviors.  Patient denies current or recent self injurious behavior or ideation.  Patient denies other safety concerns.     [place] Suicide Severity Rating Scale (Short Version)  [place] Suicide Severity Rating (Short Version) 10/25/2020 11/22/2020 12/4/2020   Over the past 2 weeks have you felt down, depressed, or hopeless? no no  no   Over the past 2 weeks have you had thoughts of killing yourself? no no no   Have you ever attempted to kill yourself? no no no       A safety and risk management plan has not been developed at this time, however patient was encouraged to call [place] [phone] should there be a change in any of these risk factors.    Appearance:   Appropriate   Eye Contact:   Good   Psychomotor Behavior: Normal   Attitude:   Cooperative  Friendly Pleasant  Orientation:   All  Speech   Rate / Production: Normal    Volume:  Normal   Mood:    Sad   Affect:    Appropriate   Thought Content:  Clear   Thought Form:  Coherent  Logical   Insight:    Good     Diagnoses:  1. Moderate episode of recurrent major depressive disorder (H)    2. Generalized anxiety disorder        Collateral Reports Completed:  Not Applicable    Plan: (Homework, other):  Patient was given information about behavioral services and encouraged to schedule a follow up appointment with the clinic [facility] in 2 weeks.  She was also given Cognitive Behavioral Therapy skills to practice when experiencing relationship problems.  CD Recommendations: No indications of CD issues. Sancho Gaines,      ______________________________________________________________________    [facility] Integrated Behavioral Health Treatment Plan    Client's Name: Susan Puckett  YOB: 1970    Date: 1/20/2021    DSM-V Diagnoses: 296.32 (F33.1) Major Depressive Disorder, Recurrent Episode, Moderate _; 300.02 (F41.1) Generalized Anxiety Disorder  Psychosocial / Contextual Factors: SOT participant  WHODAS: none on file    Clinical Global Impressions  First:  Considering your total clinical experience with this particular patient population, how severe are the patient's symptoms at this time?: 4 (1/22/2021  2:47 PM)      Most recent:  No data recorded        Referral / Collaboration:  Will collaborate with care team as indicated during treatment.    Anticipated number of session or this  episode of care: 6-10      MeasurableTreatment Goal(s) related to diagnosis / functional impairment(s)  Goal 1:  Patient will experience a reduction in depressive and anxious symptoms, along with a corresponding increase in positive emotion and life satisfaction.    Objective #A: Patient will experience a reduction in depressed mood, will develop more effective coping skills to manage depressive symptoms, will develop healthy cognitive patterns and beliefs, will increase ability to function adaptively and will continue to take medications as prescribed / participate in supportive activities and services    Status: Continued - Date(s): 1/20/2021    Objective #B: Patient will experience a reduction in anxiety, will develop more effective coping skills to manage anxiety symptoms, will develop healthy cognitive patterns and beliefs and will increase ability to function adaptively  Status: Continued - Date(s): 1/20/2021    Objective #C: Patient will develop better understanding of triggers and coping strategies to stabilize mood  Status: Continued - Date(s): 1/20/2021    Goal 2:  Patient will identify and increase engagement in valued activity, i.e. improving social connections/relationships, pursuing occupational goals or personally meaningful pursuits, exploration of meaning in life.     Objective #A: Patient will identify meaningful activity in social, occupational and  personal goals, and increase behavioral activation around these goals   Status: Continued - Date(s): 1/20/2021    Objective #B: Patient will address relationship difficulties in a more adaptive manner  Status: Continued - Date(s): 1/20/2021    Objective #C: Patient will develop coping/problem-solving skills to facilitate more adaptive adjustment and will effectively address problems that interfere with adaptive functioning  Status: Continued - Date(s): 1/20/2021      Possible Therapeutic Intervention(s)  Psycho-education regarding mental health  diagnoses and treatment options    Supportive Counseling    Insight-oriented Counseling    Skills training    Explore skills useful to client in current situation.    Skills include assertiveness, communication, conflict management, problem-solving, relaxation, etc.    Solution-Focused Therapy    Explore patterns in patient's relationships and discuss options for new behaviors.    Explore patterns in patient's actions and choices and discuss options for new behaviors.    Cognitive-behavioral Therapy    Discuss common cognitive distortions, identify them in patient's life.    Explore ways to challenge, replace, and act against these cognitions.    Acceptance and Commitment Therapy    Explore and identify important values in patient's life.    Discuss ways to commit to behavioral activation around these values.    Psychodynamic psychotherapy    Discuss patient's emotional dynamics and issues and how they impact behaviors.    Explore patient's history of relationships and how they impact present behaviors.    Explore how to work with and make changes in these schemas and patterns.    Narrative Therapy    Explore the patient's story of his/her life from his/her perspective.    Explore alternate ways of understanding their experience, identifying exceptions, developing new themes.    Interpersonal Psychotherapy    Explore patterns in relationships that are effective or ineffective at helping patient reach their goals, find satisfying experience.    Discuss new patterns or behaviors to engage in for improved social functioning.    Behavioral Activation    Discuss steps patient can take to become more involved in meaningful activity.    Identify barriers to these activities and explore possible solutions.    Mindfulness-Based Strategies    Discuss skills based on development and application of mindfulness.    Skills drawn from compassion-focused therapy, dialectical behavior therapy, mindfulness-based stress reduction,  mindfulness-based cognitive therapy, etc.      We have developed these goals together during our work to this point. Patient has assisted in the development of these goals and has agreed to this treatment plan.       Sancho Gaines LP  January 20, 2021

## 2021-01-22 ENCOUNTER — MYC MEDICAL ADVICE (OUTPATIENT)
Dept: GASTROENTEROLOGY | Facility: CLINIC | Age: 51
End: 2021-01-22

## 2021-01-25 ENCOUNTER — TELEPHONE (OUTPATIENT)
Dept: TRANSPLANT | Facility: CLINIC | Age: 51
End: 2021-01-25

## 2021-01-25 DIAGNOSIS — K74.60 CIRRHOSIS OF LIVER (H): Primary | ICD-10-CM

## 2021-01-25 NOTE — TELEPHONE ENCOUNTER
Left message asking Susan if she is able to see Dr. Alcaraz in clinic tomorrow in order to discss and assess live donor option. Ask for a call back.

## 2021-01-26 ENCOUNTER — OFFICE VISIT (OUTPATIENT)
Dept: TRANSPLANT | Facility: CLINIC | Age: 51
End: 2021-01-26
Attending: TRANSPLANT SURGERY
Payer: COMMERCIAL

## 2021-01-26 VITALS
DIASTOLIC BLOOD PRESSURE: 74 MMHG | OXYGEN SATURATION: 97 % | HEART RATE: 84 BPM | BODY MASS INDEX: 22.11 KG/M2 | SYSTOLIC BLOOD PRESSURE: 107 MMHG | WEIGHT: 137 LBS

## 2021-01-26 DIAGNOSIS — K75.81 NASH (NONALCOHOLIC STEATOHEPATITIS): Primary | ICD-10-CM

## 2021-01-26 PROCEDURE — 99207 PR NO BILLABLE SERVICE THIS VISIT: CPT | Performed by: TRANSPLANT SURGERY

## 2021-01-26 NOTE — LETTER
"    1/26/2021         RE: Susan Puckett  [address] [address]        Dear Colleague,    Thank you for referring your patient, Susan Puckett, to the [facility]. Please see a copy of my visit note below.    Patient is being evaluated for a living donor liver transplant.  Although she has a low meld score, her quality of life is poor.  And she would certainly benefit from a transplant.  No recent chest pains or any cardiac symptoms.  She is a little bit more mobile she seems to be walking more.  We discussed about nutrition and told her that she needs to get in touch with her dietitian to improve her nutritional status and is also being more active.    Vital signs:      BP: 107/74 Pulse: 84     SpO2: 97 %       Weight: 62.1 kg (137 lb)  Estimated body mass index is 22.11 kg/m  as calculated from the following:    Height as of 12/5/20: 1.676 m (5' 6\").    Weight as of this encounter: 62.1 kg (137 lb).      Examination abdomen: The abdomen is soft, the scars of the previous laparoscopic gastric bypass have healed well.  The costal angle is narrow.    I had a long discussion with the patient regarding liver transplantation which included but was not limited to  the following points:    1. The liver transplant operation and the associated risks and technical complications which can include intraoperative death, post operative death,  Primary non-function, bleeding requiring re-operations, arterial and biliary complications, bowel perforations, and intra abdominal abscess. Some of these complicaitons may require a second operation.  2. The postoperative course, the ICU stay and risk of postoperative complications which can include sepsis, MI, stroke, brain injury, pneumonia, pleural effusions, and renal dysfunction.  3. The current 1 year and 5 year graft and patient survivals.  4. The need for life long immunosuppressive therapy and the side effects of these medications, " including the possibility of toxicity, opportunistic infections, risk of cancer including lymphoma, and the possibility of rejection even if the patient is taking the medication exactly as prescribed.  5. The need for compliance with medications and follow-up visits in the clinic and thereafter.  6. The patient and family understand these risks and wish to proceed to transplantation    Time spent direct face to face counsellin min      Again, thank you for allowing me to participate in the care of your patient.        Sincerely,        Alexei Alcaraz MD

## 2021-01-26 NOTE — PROGRESS NOTES
"Patient is being evaluated for a living donor liver transplant.  Although she has a low meld score, her quality of life is poor.  And she would certainly benefit from a transplant.  No recent chest pains or any cardiac symptoms.  She is a little bit more mobile she seems to be walking more.  We discussed about nutrition and told her that she needs to get in touch with her dietitian to improve her nutritional status and is also being more active.    Vital signs:      BP: 107/74 Pulse: 84     SpO2: 97 %       Weight: 62.1 kg (137 lb)  Estimated body mass index is 22.11 kg/m  as calculated from the following:    Height as of 12/5/20: 1.676 m (5' 6\").    Weight as of this encounter: 62.1 kg (137 lb).      Examination abdomen: The abdomen is soft, the scars of the previous laparoscopic gastric bypass have healed well.  The costal angle is narrow.    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 The liver transplant operation and the associated risks and technical complications which can include intraoperative death, post operative death,  Primary non-function, bleeding requiring re-operations, arterial and biliary complications, bowel perforations, and intra abdominal abscess. Some of these complicaitons may require a second operation.  2. The postoperative course, the ICU stay and risk of postoperative complications which can include sepsis, MI, stroke, brain injury, pneumonia, pleural effusions, and renal dysfunction.  3. The current 1 year and 5 year graft and patient survivals.  4. The need for life long immunosuppressive therapy and the side effects of these medications, including the possibility of toxicity, opportunistic infections, risk of cancer including lymphoma, and the possibility of rejection even if the patient is " taking the medication exactly as prescribed.  5. The need for compliance with medications and follow-up visits in the clinic and thereafter.  6. The patient and family understand these risks and wish to proceed to transplantation    Time spent direct face to face counsellin min  HPI      VANESSA      Physical Exam

## 2021-01-28 ENCOUNTER — DOCUMENTATION ONLY (OUTPATIENT)
Dept: TRANSPLANT | Facility: CLINIC | Age: 51
End: 2021-01-28

## 2021-01-28 ENCOUNTER — PATIENT OUTREACH (OUTPATIENT)
Dept: GASTROENTEROLOGY | Facility: CLINIC | Age: 51
End: 2021-01-28

## 2021-01-28 NOTE — TELEPHONE ENCOUNTER
Pt reported increased fatigue and stated that she feels like she could sleep all the time. Pt denied confusion and has been compliant with lactulose and xifaxan. Pt is currently taking lactulose 30 ml 3 times daily and having 3-4 BMs/day. Discussed pt's nutrition and activity level. Encouraged pt to eat a high protein diet and stay as active as possible. Notified pt that writer would reach out to Dr. Cook and see if he had any additional thoughts or recommendations regarding fatigue. Pt verbalized understanding and agreeable to plan of care.

## 2021-01-28 NOTE — LETTER
January 28, 2021    Susan Puckett  [address] [address]      Dear Ms. Puckett,    This letter is sent to confirm that you have completed your transplant work-up and you are a candidate in the liver transplant program at the [facility].  You were placed on the liver active waitlist on 1/28/2021.      When you are active on the waitlist and an organ becomes available, a coordinator will need to speak to you immediately.  You could be contacted at any time during the day or night as an organ could become available at any time.  Please make certain our office always has your current telephone numbers and address.      Items we will need from you:      We have received approval from your insurance company for the transplant procedure.  It is critical that you notify us if there is any change in your insurance.  It is also important that you familiarize yourself with the details of your specific insurance policy.  Our patient  is available to assist you if you should have any questions regarding your coverage.      To remain active on the transplant list, MELD labs must be drawn every year      During this waiting period, we may request additional periodic laboratory tests with your primary physician.  It will be your responsibility to remind your physician to forward your results to the Transplant Office.      We need to be kept informed of any changes in your medical condition such as:    o changes in your medications,   o significant changes in your health  o significant infections (such as pneumonia or abscesses)  o blood transfusions  o any condition which requires hospitalization  o any surgery      Remember to complete any routine cancer screening tests required before your transplant.  This includes colonoscopy; prostate screening for men, and mammogram and gynecologic testing for women, as well as dental work.  Your primary care clinic can  assist you with arranging for these exams.  Remind your caregivers to forward copies of the records and final reports.    We want you to know that our program has physician and surgeon coverage 24 hours a day, 365 days a year. If this coverage changes or there are substantial program changes, you will be notified in writing by letter.     Attached is a letter from the [employer] Organ [employer]aring (UNOS). It describes the services and information offered to patients by UNOS and the Organ Procurement and Transplantation Network.    We appreciate having had the opportunity to participate in your care.  If you have questions, please feel free to call the Transplant Office at [phone] or [phone].      Sincerely,    Daja Suarez    Solid Organ Transplant  [facility]    Enclosures: PHILOS Letter  cc: Care Team                          The Organ Procurement and Transplantation Network  Toll-free patient services line:     Your resource for organ transplant information    If you have a question regarding your own medical care, you always should call your transplant hospital first. However, for general organ transplant-related information, you can call the Organ Procurement and Transplantation Network (OPTN) toll-free patient services line at [phone]. Anyone, including potential transplant candidates, candidates, recipients, family members, friends, living donors, and donor family members, can call this number to:          Talk about organ donation, living donation, the transplant process, the donation process, and transplant policies.    Get a free patient information kit with helpful booklets, waiting list and transplant information, and a list of all transplant hospitals.    Ask questions about the OPTN website (https://optn.transplant.hrsa.gov/), the [employer] for Organ [employer]aring s (UNOS) website (https://unos.org/), or the  UNOS website for living donors and transplant recipients. (https://www.transplantliving.org/).    Learn how the OPTN can help you.    Talk about any concerns that you may have with a transplant hospital.    The nation s transplant system, the OPTN, is managed under federal contract by the [employer] Organ [employer]aring (UNOS), which is a non-profit charitable organization. The OPTN helps create and define organ sharing policies that make the best use of donated organs. This process continuously evaluating new advances and discoveries so policies can be adapted to best serve patients waiting for transplants. To do so, the OPTN works closely with transplant professionals, transplant patients, transplant candidates, donor families, living donors, and the public. All transplant programs and organ procurement organizations throughout the country are OPTN members and are obligated to follow the policies the OPTN creates for allocating organs.    The OPTN also is responsible for:      Providing educational material for patients, the public, and professionals.    Raising awareness of the need for donated organs and tissue.    Coordinating organ procurement, matching, and placement.    Collecting information about every organ transplant and donation that occurs in the [place].    Remember, you should contact your transplant hospital directly if you have questions or concerns about your own medical care including medical records, work-up progress, and test results.    We are not your transplant hospital, and our staff will not be able to answer questions about your case, so please keep your transplant hospital s phone number handy.    However, while you research your transplant needs and learn as much as you can about transplantation and donation, we welcome your call to our toll-free patient services line at [phone].          Updated 4/1/2019

## 2021-01-29 ENCOUNTER — DOCUMENTATION ONLY (OUTPATIENT)
Dept: TRANSPLANT | Facility: CLINIC | Age: 51
End: 2021-01-29

## 2021-01-29 ENCOUNTER — TELEPHONE (OUTPATIENT)
Dept: TRANSPLANT | Facility: CLINIC | Age: 51
End: 2021-01-29

## 2021-01-29 ENCOUNTER — MYC MEDICAL ADVICE (OUTPATIENT)
Dept: GASTROENTEROLOGY | Facility: CLINIC | Age: 51
End: 2021-01-29

## 2021-01-29 NOTE — CONFIDENTIAL NOTE
Pharmacy Liver Pre-Transplant Medication Evaluation    This patient is a 50 year old female being considered for liver transplantation.   As part of the liver pre-transplant patient evaluation, pharmacy has screened this patient s electronic medical record for medication related concerns.     Assessment/Plan:  The following medication related issues may be of possible concern for this patient post transplant, based on the medical record medication list review:     Spironolactone: may cause multiple electrolyte imbalances, in particular hyperkalemia.  Atorvastatin's dose is usually decreased due to taking Tacrolimus.  Taking Venlafaxine and Trazodone in addition to Tacrolimus increases the risk of QT-interval prolongation. It doesn't mean she can't take them. She just needs to be monitored.  Januvia- It has been reported to have worsened renal function. The odds are slim, but her renal function needs to be monitored.    Pharmacy will continue to participate in this patient's care throughout the transplant course. Please contact pharmacy with any further medication related questions or concerns.    Onelia Soto [facility] Pharmacist  [facility]  [phone]

## 2021-02-10 ENCOUNTER — VIRTUAL VISIT (OUTPATIENT)
Dept: BEHAVIORAL HEALTH | Facility: CLINIC | Age: 51
End: 2021-02-10
Payer: COMMERCIAL

## 2021-02-10 ENCOUNTER — TRANSFERRED RECORDS (OUTPATIENT)
Dept: HEALTH INFORMATION MANAGEMENT | Facility: CLINIC | Age: 51
End: 2021-02-10

## 2021-02-10 ENCOUNTER — MEDICAL CORRESPONDENCE (OUTPATIENT)
Dept: HEALTH INFORMATION MANAGEMENT | Facility: CLINIC | Age: 51
End: 2021-02-10

## 2021-02-10 DIAGNOSIS — F33.1 MAJOR DEPRESSIVE DISORDER, RECURRENT EPISODE, MODERATE (H): Primary | ICD-10-CM

## 2021-02-10 PROCEDURE — 90834 PSYTX W PT 45 MINUTES: CPT | Mod: 95 | Performed by: PSYCHOLOGIST

## 2021-02-10 ASSESSMENT — ANXIETY QUESTIONNAIRES
4. TROUBLE RELAXING: NOT AT ALL
1. FEELING NERVOUS, ANXIOUS, OR ON EDGE: SEVERAL DAYS
7. FEELING AFRAID AS IF SOMETHING AWFUL MIGHT HAPPEN: NOT AT ALL
GAD7 TOTAL SCORE: 3
3. WORRYING TOO MUCH ABOUT DIFFERENT THINGS: SEVERAL DAYS
2. NOT BEING ABLE TO STOP OR CONTROL WORRYING: SEVERAL DAYS
6. BECOMING EASILY ANNOYED OR IRRITABLE: NOT AT ALL
GAD7 TOTAL SCORE: 3
7. FEELING AFRAID AS IF SOMETHING AWFUL MIGHT HAPPEN: NOT AT ALL
GAD7 TOTAL SCORE: 3
5. BEING SO RESTLESS THAT IT IS HARD TO SIT STILL: NOT AT ALL

## 2021-02-10 ASSESSMENT — PATIENT HEALTH QUESTIONNAIRE - PHQ9
10. IF YOU CHECKED OFF ANY PROBLEMS, HOW DIFFICULT HAVE THESE PROBLEMS MADE IT FOR YOU TO DO YOUR WORK, TAKE CARE OF THINGS AT HOME, OR GET ALONG WITH OTHER PEOPLE: SOMEWHAT DIFFICULT
SUM OF ALL RESPONSES TO PHQ QUESTIONS 1-9: 8
SUM OF ALL RESPONSES TO PHQ QUESTIONS 1-9: 8

## 2021-02-10 NOTE — PROGRESS NOTES
[facility]: Integrated Behavioral Health  February 10, 2021      Behavioral Health Clinician Progress Note    Patient Name: Susan Puckett           Service Type: Individual      Service Location:  Video visit      Session Start Time: 2:02  Session End Time: 2:48      Session Length: 38 - 52      Attendees: Patient    Visit Activities (Refresh list every visit): [facility] Only    Telemedicine Visit: The patient's condition can be safely assessed and treated via synchronous audio and visual telemedicine encounter.      Reason for Telemedicine Visit: Services only offered telehealth    Originating Site (Patient Location): Patient's home    Distant Site (Provider Location): Provider Remote Setting    Consent:  The patient/guardian has verbally consented to: the potential risks and benefits of telemedicine (video visit) versus in person care; bill my insurance or make self-payment for services provided; and responsibility for payment of non-covered services.     Mode of Communication:  Video Conference via [employer]    As the provider I attest to compliance with applicable laws and regulations related to telemedicine.    Diagnostic Assessment Date: completed by Ozzie Augustin MD on 11/5  Treatment Plan Review Date:4/20/2021  See Flowsheets for today's PHQ-9 and AILIN-7 results  Previous PHQ-9:   PHQ-9 SCORE 11/5/2020 2/10/2021   PHQ-9 Total Score MyChart 10 (Moderate depression) 8 (Mild depression)   PHQ-9 Total Score 10 8     Previous AILIN-7:   AILIN-7 SCORE 11/5/2020 2/10/2021   Total Score 10 (moderate anxiety) 3 (minimal anxiety)   Total Score 10 3       LUIS LEVEL:  LUIS Score (Last Two) 10/18/2020   LUIS Raw Score 35   Activation Score 72.1   LUIS Level 3       DATA  Extended Session (60+ minutes): No  Interactive Complexity: No  Crisis: No    Treatment Objective(s) Addressed in This Session:  Target Behavior(s): disease management/lifestyle changes related to sleep    Depressed Mood:  Decrease frequency and intensity of feeling down, depressed, hopeless  Psychological distress related to Sleep Disturbance    Current Stressors / Issues:  Susan completed a [facility] follow-up today. Her main concerns today were with sleep and her diabetes management. She suggested having a hard time finding restful sleep, noting specific difficulties with early morning waking. Regarding improving sleep hygiene we discussed the followin. Avoiding all screen time before bed.   2. Listen to relaxation tape for sleep in the AM to get a few extra hours.   3. Aromatherapy like lavendar for help with sleep  4. Eye fold for darkness, optimizing temperature    With diabetes management, Susan reported difficulties with the dietary changes she has had to make and what she is no longer able to eat. We processed feelings about guilt and helplessness she experiences with these at times. Made use of a few cognitive strategies to help her feel less down/depressed. Looked at how she could try making good diabetes management a choice instead of a problem. We explored what she likes about her diet and how viewing eating healthy foods can be a choice versus a restriction.     Supportive counseling was continued to be used.      Answers for HPI/ROS submitted by the patient on 2/10/2021   If you checked off any problems, how difficult have these problems made it for you to do your work, take care of things at home, or get along with other people?: Somewhat difficult  PHQ9 TOTAL SCORE: 8  AILIN 7 TOTAL SCORE: 3      Progress on Treatment Objective(s) / Homework:  Satisfactory progress - ACTION (Actively working towards change); Intervened by reinforcing change plan / affirming steps taken    Supportive and solution-focused counseling emphasized today    Care Plan review completed: No    Medication Review:  No changes to current psychiatric medication(s)    Medication Compliance:  Yes    Changes in Health Issues:   Yes: Chronic disease  management, Associated Psychological Distress    Chemical Use Review:   Substance Use: Chemical use reviewed, no active concerns identified      Tobacco Use: No current tobacco use.      Assessment: Current Emotional / Mental Status (status of significant symptoms):  Risk status (Self / Other harm or suicidal ideation)  Patient denies a history of suicidal ideation, suicide attempts, self-injurious behavior, homicidal ideation, homicidal behavior and and other safety concerns     Patient denies current fears or concerns for personal safety.  Patient denies current or recent suicidal ideation or behaviors.  Patient denies current or recent homicidal ideation or behaviors.  Patient denies current or recent self injurious behavior or ideation.  Patient denies other safety concerns.     [place] Suicide Severity Rating Scale (Short Version)  [place] Suicide Severity Rating (Short Version) 10/25/2020 11/22/2020 12/4/2020   Over the past 2 weeks have you felt down, depressed, or hopeless? no no no   Over the past 2 weeks have you had thoughts of killing yourself? no no no   Have you ever attempted to kill yourself? no no no       A safety and risk management plan has not been developed at this time, however patient was encouraged to call [place] [phone] should there be a change in any of these risk factors.    Appearance:   Appropriate   Eye Contact:   Good   Psychomotor Behavior: Normal   Attitude:   Cooperative  Friendly Pleasant  Orientation:   All  Speech   Rate / Production: Normal    Volume:  Normal   Mood:    Sad   Affect:    Appropriate   Thought Content:  Clear   Thought Form:  Coherent  Logical   Insight:    Good     Diagnoses:  1. Major depressive disorder, recurrent episode, moderate (H)        Collateral Reports Completed:  Not Applicable    Plan: (Homework, other):  Susan agreed to return to counseling in about two weeks She was also given Cognitive Behavioral Therapy skills to practice when experiencing  depression and sleep Hygeine recommendations, including a handout with tips and strategies.  CD Recommendations: No indications of CD issues. Sancho Gaines, LP     ______________________________________________________________________    [facility] Integrated Behavioral Health Treatment Plan    Client's Name: Susan Puckett  YOB: 1970    Date: 1/20/2021    DSM-V Diagnoses: 296.32 (F33.1) Major Depressive Disorder, Recurrent Episode, Moderate _; 300.02 (F41.1) Generalized Anxiety Disorder  Psychosocial / Contextual Factors: SOT participant  WHODAS: none on file    Clinical Global Impressions  First:  Considering your total clinical experience with this particular patient population, how severe are the patient's symptoms at this time?: 4 (1/22/2021  2:47 PM)      Most recent:  No data recorded        Referral / Collaboration:  Will collaborate with care team as indicated during treatment.    Anticipated number of session or this episode of care: 6-10      MeasurableTreatment Goal(s) related to diagnosis / functional impairment(s)  Goal 1:  Patient will experience a reduction in depressive and anxious symptoms, along with a corresponding increase in positive emotion and life satisfaction.    Objective #A: Patient will experience a reduction in depressed mood, will develop more effective coping skills to manage depressive symptoms, will develop healthy cognitive patterns and beliefs, will increase ability to function adaptively and will continue to take medications as prescribed / participate in supportive activities and services    Status: Continued - Date(s): 1/20/2021    Objective #B: Patient will experience a reduction in anxiety, will develop more effective coping skills to manage anxiety symptoms, will develop healthy cognitive patterns and beliefs and will increase ability to function adaptively  Status: Continued - Date(s): 1/20/2021    Objective #C: Patient will develop better understanding of triggers  and coping strategies to stabilize mood  Status: Continued - Date(s): 1/20/2021    Goal 2:  Patient will identify and increase engagement in valued activity, i.e. improving social connections/relationships, pursuing occupational goals or personally meaningful pursuits, exploration of meaning in life.     Objective #A: Patient will identify meaningful activity in social, occupational and  personal goals, and increase behavioral activation around these goals   Status: Continued - Date(s): 1/20/2021    Objective #B: Patient will address relationship difficulties in a more adaptive manner  Status: Continued - Date(s): 1/20/2021    Objective #C: Patient will develop coping/problem-solving skills to facilitate more adaptive adjustment and will effectively address problems that interfere with adaptive functioning  Status: Continued - Date(s): 1/20/2021      Possible Therapeutic Intervention(s)  Psycho-education regarding mental health diagnoses and treatment options    Supportive Counseling    Insight-oriented Counseling    Skills training    Explore skills useful to client in current situation.    Skills include assertiveness, communication, conflict management, problem-solving, relaxation, etc.    Solution-Focused Therapy    Explore patterns in patient's relationships and discuss options for new behaviors.    Explore patterns in patient's actions and choices and discuss options for new behaviors.    Cognitive-behavioral Therapy    Discuss common cognitive distortions, identify them in patient's life.    Explore ways to challenge, replace, and act against these cognitions.    Acceptance and Commitment Therapy    Explore and identify important values in patient's life.    Discuss ways to commit to behavioral activation around these values.    Psychodynamic psychotherapy    Discuss patient's emotional dynamics and issues and how they impact behaviors.    Explore patient's history of relationships and how they impact present  behaviors.    Explore how to work with and make changes in these schemas and patterns.    Narrative Therapy    Explore the patient's story of his/her life from his/her perspective.    Explore alternate ways of understanding their experience, identifying exceptions, developing new themes.    Interpersonal Psychotherapy    Explore patterns in relationships that are effective or ineffective at helping patient reach their goals, find satisfying experience.    Discuss new patterns or behaviors to engage in for improved social functioning.    Behavioral Activation    Discuss steps patient can take to become more involved in meaningful activity.    Identify barriers to these activities and explore possible solutions.    Mindfulness-Based Strategies    Discuss skills based on development and application of mindfulness.    Skills drawn from compassion-focused therapy, dialectical behavior therapy, mindfulness-based stress reduction, mindfulness-based cognitive therapy, etc.      We have developed these goals together during our work to this point. Patient has assisted in the development of these goals and has agreed to this treatment plan.       Sancho Gaines LP  January 20, 2021

## 2021-02-10 NOTE — PATIENT INSTRUCTIONS
"Patient Education     Tips for Sleep Hygiene  \"Sleep hygiene\" means having good sleep habits.Follow these tips to sleep better at night:     Get on a schedule. Go to bed and get up at about the same time every day.    Listen to your body. Only try to sleep when you actually feel tired or sleepy.    Be patient. If you haven't been able to get to sleep after about 30 minutes or more, get up and do something calming or boring until you feel sleepy. Then return to bed and try again.    Don't have caffeine (coffee, tea, cola drinks, chocolate and some medicines), alcohol or nicotine (cigarettes). These can make it harder for you to fall asleep and stay asleep.    Use your bed for sleeping only. That means no TV, computer or homework in bed, especially during the evening and before bedtime.    Don't nap during the day. If you must nap, make sure it is for less than 20 minutes.    Create sleep rituals that remind your body it is time to sleep. Examples include breathing exercises, stretching or reading a book.    Avoid all electronic media (smart phone, computer, tablet) within 2 hours of bed time. The \"blue light\" in these devices activates the part of the brain that keeps you awake.    Dim the lights at night.    Get early morning sources of light (walk in the sunshine) to help set sleep patterns at night.    Try a bath or shower before bed. Having a warm bath 1 to 2 hours before bedtime can help you feel sleepy. Hot baths can make you alert, so be mindful of the temperature.    Don't watch the clock. Checking the clock during the night can wake you up. It can also lead to negative thoughts such as, \"I will never fall asleep,\" which can increase anxiety and sleeplessness.    Use a sleep diary. Track your sleep schedule to know your sleep patterns and to see where you can improve.    Get regular exercise every day. Try not to do heavy exercise in the 4 hours before bedtime.    Eat a healthy, balanced diet.    Try eating a " light, healthy snack before bed, but avoid eating a heavy meal.    Create the right sleeping area. A cool, dark, quiet room is best. If needed, try earplugs, fans and blackout curtains.    Keep your daytime routine the same even if you have a bad night sleep. Avoiding activities the next day can make it harder to sleep.  For informational purposes only. Not to replace the advice of your health care provider.   Copyright   2013 [facility]. All rights reserved. [employer] [id number] - 01/16.

## 2021-02-11 ASSESSMENT — ANXIETY QUESTIONNAIRES: GAD7 TOTAL SCORE: 3

## 2021-02-11 ASSESSMENT — PATIENT HEALTH QUESTIONNAIRE - PHQ9: SUM OF ALL RESPONSES TO PHQ QUESTIONS 1-9: 8

## 2021-02-12 NOTE — TELEPHONE ENCOUNTER
Pt reported experiencing increased itching that is impacting her quality of life. Provided pt with some non medication strategies for management of itching. Notified pt that writer will also reach out to Dr. Cook to see if he recommends any medications to help with pt's pruritis.     Pt also reported increased frequency of headaches and nausea. Pt is taking nausea medication and imitrex as needed. Discussed that nausea is likely secondary to migraines and encouraged pt to address migraine management with primary care provider.     Pt denied experiencing any confusion and has been compliant with lactulose and xifaxan. Pt is averaging 3 BMs per day. Educated pt to continue to take lactulose to achieve 3-5 BMs/day.     Plan to check in with pt in 2-3 weeks. Pt verbalized understanding and is agreeable to plan of care.

## 2021-02-14 ENCOUNTER — TELEPHONE (OUTPATIENT)
Dept: TRANSPLANT | Facility: CLINIC | Age: 51
End: 2021-02-14

## 2021-02-14 NOTE — TELEPHONE ENCOUNTER
Pt is having horrible abdominal pain, pt rating pain 6-7/10, nauseated on and off. Pt is having lower back pain that seems to be increasing as the day goes on. Pt did take zofran today and recently took compazine. she Feels she has the urge to have bowel movement, but cannot go. Has not had a bowel movement today despite taking lactulose twice today. Pt feels slightly bloated.     No chills, temp 97.   No confusion or fogginess. Pt is very exhausted the last month. Shakiness in hands on and off.   Pt will try dose of tylenol and if pain remains unbearable she will go to the ER for evaluation.

## 2021-02-22 ENCOUNTER — PATIENT OUTREACH (OUTPATIENT)
Dept: GASTROENTEROLOGY | Facility: CLINIC | Age: 51
End: 2021-02-22

## 2021-02-22 DIAGNOSIS — L29.9 PRURITIC DISORDER: Primary | ICD-10-CM

## 2021-02-22 NOTE — CONFIDENTIAL NOTE
"Pt reported worsening itching, insomnia, and asterixis. Pt has tried non medication interventions for itching with no improvement in symptoms. Pt also takes trazodone and effexor at night for insomnia but does not feel that either are effective at this time. Pt reported having \"brain fog\" and is wondering if this is due to the insomnia and/or pt needs more lactulose. Pt is currently taking lactulose 30 ml 3 times daily and xifaxan 550 mg 2 times daily. Pt is having 3-4 BMs/day with current lactulose regimen. Reviewed with pt that writer will discuss pt's worsening itching and insomnia with Dr. Cook and follow up with any additional recommendations. Encouraged pt to take an additional 15-30 ml of lactulose per day to see if this helps with mentation and asterixis. Pt verbalized understanding and is agreeable to plan of care.   "

## 2021-02-25 ENCOUNTER — TELEPHONE (OUTPATIENT)
Dept: TRANSPLANT | Facility: CLINIC | Age: 51
End: 2021-02-25

## 2021-02-25 ENCOUNTER — HOSPITAL ENCOUNTER (EMERGENCY)
Facility: CLINIC | Age: 51
Discharge: HOME | End: 2021-02-26
Attending: EMERGENCY MEDICINE | Admitting: EMERGENCY MEDICINE
Payer: COMMERCIAL

## 2021-02-25 DIAGNOSIS — L29.9 GENERALIZED PRURITUS: ICD-10-CM

## 2021-02-25 DIAGNOSIS — K74.60 LIVER CIRRHOSIS SECONDARY TO NASH (H): ICD-10-CM

## 2021-02-25 DIAGNOSIS — R73.9 HYPERGLYCEMIA: ICD-10-CM

## 2021-02-25 DIAGNOSIS — Z79.4 ENCOUNTER FOR LONG-TERM (CURRENT) USE OF INSULIN (H): ICD-10-CM

## 2021-02-25 DIAGNOSIS — K75.81 LIVER CIRRHOSIS SECONDARY TO NASH (H): ICD-10-CM

## 2021-02-25 DIAGNOSIS — Z11.52 ENCOUNTER FOR SCREENING LABORATORY TESTING FOR SEVERE ACUTE RESPIRATORY SYNDROME CORONAVIRUS 2 (SARS-COV-2): ICD-10-CM

## 2021-02-25 LAB
ALBUMIN SERPL-MCNC: 3.3 G/DL (ref 3.4–5)
ALBUMIN UR-MCNC: NEGATIVE MG/DL
ALP SERPL-CCNC: 132 U/L (ref 40–150)
ALT SERPL W P-5'-P-CCNC: 30 U/L (ref 0–50)
AMMONIA PLAS-SCNC: NORMAL UMOL/L (ref 10–50)
ANION GAP SERPL CALCULATED.3IONS-SCNC: 6 MMOL/L (ref 3–14)
APPEARANCE UR: CLEAR
AST SERPL W P-5'-P-CCNC: 31 U/L (ref 0–45)
BASOPHILS # BLD AUTO: 0 10E9/L (ref 0–0.2)
BASOPHILS NFR BLD AUTO: 0.2 %
BILIRUB SERPL-MCNC: 0.8 MG/DL (ref 0.2–1.3)
BILIRUB UR QL STRIP: NEGATIVE
BUN SERPL-MCNC: 15 MG/DL (ref 7–30)
CALCIUM SERPL-MCNC: 8.8 MG/DL (ref 8.5–10.1)
CHLORIDE SERPL-SCNC: 97 MMOL/L (ref 94–109)
CO2 SERPL-SCNC: 27 MMOL/L (ref 20–32)
COLOR UR AUTO: ABNORMAL
CREAT SERPL-MCNC: 1.07 MG/DL (ref 0.52–1.04)
DIFFERENTIAL METHOD BLD: ABNORMAL
EOSINOPHIL # BLD AUTO: 0.1 10E9/L (ref 0–0.7)
EOSINOPHIL NFR BLD AUTO: 1.4 %
ERYTHROCYTE [DISTWIDTH] IN BLOOD BY AUTOMATED COUNT: 14.1 % (ref 10–15)
GFR SERPL CREATININE-BSD FRML MDRD: 61 ML/MIN/{1.73_M2}
GLUCOSE BLDC GLUCOMTR-MCNC: 330 MG/DL (ref 70–99)
GLUCOSE SERPL-MCNC: 565 MG/DL (ref 70–99)
GLUCOSE UR STRIP-MCNC: >1000 MG/DL
HCT VFR BLD AUTO: 29.6 % (ref 35–47)
HGB BLD-MCNC: 10.1 G/DL (ref 11.7–15.7)
HGB UR QL STRIP: NEGATIVE
IMM GRANULOCYTES # BLD: 0 10E9/L (ref 0–0.4)
IMM GRANULOCYTES NFR BLD: 0.4 %
KETONES BLD-SCNC: 0.2 MMOL/L (ref 0–0.6)
KETONES UR STRIP-MCNC: NEGATIVE MG/DL
LABORATORY COMMENT REPORT: NORMAL
LEUKOCYTE ESTERASE UR QL STRIP: NEGATIVE
LYMPHOCYTES # BLD AUTO: 1.2 10E9/L (ref 0.8–5.3)
LYMPHOCYTES NFR BLD AUTO: 24.2 %
MCH RBC QN AUTO: 33 PG (ref 26.5–33)
MCHC RBC AUTO-ENTMCNC: 34.1 G/DL (ref 31.5–36.5)
MCV RBC AUTO: 97 FL (ref 78–100)
MONOCYTES # BLD AUTO: 0.4 10E9/L (ref 0–1.3)
MONOCYTES NFR BLD AUTO: 7.7 %
MUCOUS THREADS #/AREA URNS LPF: PRESENT /LPF
NEUTROPHILS # BLD AUTO: 3.2 10E9/L (ref 1.6–8.3)
NEUTROPHILS NFR BLD AUTO: 66.1 %
NITRATE UR QL: NEGATIVE
NRBC # BLD AUTO: 0 10*3/UL
NRBC BLD AUTO-RTO: 0 /100
PH UR STRIP: 6 PH (ref 5–7)
PLATELET # BLD AUTO: 77 10E9/L (ref 150–450)
POTASSIUM SERPL-SCNC: 4.1 MMOL/L (ref 3.4–5.3)
PROT SERPL-MCNC: 8.3 G/DL (ref 6.8–8.8)
RBC # BLD AUTO: 3.06 10E12/L (ref 3.8–5.2)
RBC #/AREA URNS AUTO: 1 /HPF (ref 0–2)
SARS-COV-2 RNA RESP QL NAA+PROBE: NEGATIVE
SODIUM SERPL-SCNC: 131 MMOL/L (ref 133–144)
SOURCE: ABNORMAL
SP GR UR STRIP: 1.03 (ref 1–1.03)
SPECIMEN SOURCE: NORMAL
SQUAMOUS #/AREA URNS AUTO: 1 /HPF (ref 0–1)
UROBILINOGEN UR STRIP-MCNC: NORMAL MG/DL (ref 0–2)
WBC # BLD AUTO: 4.9 10E9/L (ref 4–11)
WBC #/AREA URNS AUTO: 3 /HPF (ref 0–5)

## 2021-02-25 PROCEDURE — U0005 INFEC AGEN DETEC AMPLI PROBE: HCPCS | Performed by: EMERGENCY MEDICINE

## 2021-02-25 PROCEDURE — 250N000011 HC RX IP 250 OP 636: Performed by: EMERGENCY MEDICINE

## 2021-02-25 PROCEDURE — 96361 HYDRATE IV INFUSION ADD-ON: CPT

## 2021-02-25 PROCEDURE — 999N001017 HC STATISTIC GLUCOSE BY METER IP

## 2021-02-25 PROCEDURE — 99284 EMERGENCY DEPT VISIT MOD MDM: CPT | Performed by: EMERGENCY MEDICINE

## 2021-02-25 PROCEDURE — U0003 INFECTIOUS AGENT DETECTION BY NUCLEIC ACID (DNA OR RNA); SEVERE ACUTE RESPIRATORY SYNDROME CORONAVIRUS 2 (SARS-COV-2) (CORONAVIRUS DISEASE [COVID-19]), AMPLIFIED PROBE TECHNIQUE, MAKING USE OF HIGH THROUGHPUT TECHNOLOGIES AS DESCRIBED BY CMS-2020-01-R: HCPCS | Performed by: EMERGENCY MEDICINE

## 2021-02-25 PROCEDURE — 82010 KETONE BODYS QUAN: CPT | Performed by: EMERGENCY MEDICINE

## 2021-02-25 PROCEDURE — 80053 COMPREHEN METABOLIC PANEL: CPT | Performed by: EMERGENCY MEDICINE

## 2021-02-25 PROCEDURE — 96360 HYDRATION IV INFUSION INIT: CPT | Mod: 59

## 2021-02-25 PROCEDURE — 99285 EMERGENCY DEPT VISIT HI MDM: CPT | Mod: 25

## 2021-02-25 PROCEDURE — 99217 PR OBSERVATION CARE DISCHARGE: CPT | Performed by: EMERGENCY MEDICINE

## 2021-02-25 PROCEDURE — C9803 HOPD COVID-19 SPEC COLLECT: HCPCS

## 2021-02-25 PROCEDURE — 258N000003 HC RX IP 258 OP 636: Performed by: EMERGENCY MEDICINE

## 2021-02-25 PROCEDURE — 81001 URINALYSIS AUTO W/SCOPE: CPT | Performed by: EMERGENCY MEDICINE

## 2021-02-25 PROCEDURE — 82140 ASSAY OF AMMONIA: CPT | Performed by: EMERGENCY MEDICINE

## 2021-02-25 PROCEDURE — 85025 COMPLETE CBC W/AUTO DIFF WBC: CPT | Performed by: EMERGENCY MEDICINE

## 2021-02-25 RX ORDER — SODIUM CHLORIDE 9 MG/ML
INJECTION, SOLUTION INTRAVENOUS CONTINUOUS
Status: DISCONTINUED | OUTPATIENT
Start: 2021-02-25 | End: 2021-02-26 | Stop reason: HOSPADM

## 2021-02-25 RX ORDER — ONDANSETRON 4 MG/1
4 TABLET, ORALLY DISINTEGRATING ORAL ONCE
Status: COMPLETED | OUTPATIENT
Start: 2021-02-25 | End: 2021-02-25

## 2021-02-25 RX ADMIN — SODIUM CHLORIDE 1000 ML: 9 INJECTION, SOLUTION INTRAVENOUS at 22:44

## 2021-02-25 RX ADMIN — ONDANSETRON 4 MG: 4 TABLET, ORALLY DISINTEGRATING ORAL at 20:27

## 2021-02-25 ASSESSMENT — MIFFLIN-ST. JEOR: SCORE: 1269.54

## 2021-02-25 ASSESSMENT — ENCOUNTER SYMPTOMS
SLEEP DISTURBANCE: 1
FEVER: 1
APPETITE CHANGE: 1
ABDOMINAL DISTENTION: 1
VOMITING: 1
FATIGUE: 1

## 2021-02-25 NOTE — TELEPHONE ENCOUNTER
"Patient called via triage very tearful and concerned.  She reports that she doesn't know who to talk to about her labs.  She talked to her primary care because she is sleeping for 16-18 hours/day.  Primary care sent her for labs & called her because they \"aren't right\".    Reviewed labs.  Sodium down to 124  (from 135 on 2/2).  Blood glucose >500, glucose in urine.  Platelet count also slightly lower, INR slightly elevated.    Writer spoke to Dr. Burgess () who recommends patient be evaluated in ED.  Updated patient.  Report called to Dr. Sierra.  "

## 2021-02-26 ENCOUNTER — APPOINTMENT (OUTPATIENT)
Dept: CT IMAGING | Facility: CLINIC | Age: 51
End: 2021-02-26
Attending: EMERGENCY MEDICINE
Payer: COMMERCIAL

## 2021-02-26 VITALS
DIASTOLIC BLOOD PRESSURE: 77 MMHG | RESPIRATION RATE: 20 BRPM | HEART RATE: 66 BPM | BODY MASS INDEX: 22.07 KG/M2 | SYSTOLIC BLOOD PRESSURE: 106 MMHG | TEMPERATURE: 97.9 F | HEIGHT: 66 IN | OXYGEN SATURATION: 95 % | WEIGHT: 137.3 LBS

## 2021-02-26 LAB — AMMONIA PLAS-SCNC: 60 UMOL/L (ref 10–50)

## 2021-02-26 PROCEDURE — 250N000013 HC RX MED GY IP 250 OP 250 PS 637: Performed by: EMERGENCY MEDICINE

## 2021-02-26 PROCEDURE — 74177 CT ABD & PELVIS W/CONTRAST: CPT

## 2021-02-26 PROCEDURE — 258N000003 HC RX IP 258 OP 636: Performed by: EMERGENCY MEDICINE

## 2021-02-26 PROCEDURE — 250N000011 HC RX IP 250 OP 636: Performed by: EMERGENCY MEDICINE

## 2021-02-26 PROCEDURE — 250N000009 HC RX 250: Performed by: EMERGENCY MEDICINE

## 2021-02-26 PROCEDURE — 74177 CT ABD & PELVIS W/CONTRAST: CPT | Mod: 26 | Performed by: RADIOLOGY

## 2021-02-26 RX ORDER — LACTULOSE 10 G/15ML
20 SOLUTION ORAL ONCE
Status: COMPLETED | OUTPATIENT
Start: 2021-02-26 | End: 2021-02-26

## 2021-02-26 RX ORDER — IOPAMIDOL 755 MG/ML
85 INJECTION, SOLUTION INTRAVASCULAR ONCE
Status: COMPLETED | OUTPATIENT
Start: 2021-02-26 | End: 2021-02-26

## 2021-02-26 RX ADMIN — SODIUM CHLORIDE, PRESERVATIVE FREE 71 ML: 5 INJECTION INTRAVENOUS at 01:16

## 2021-02-26 RX ADMIN — SODIUM CHLORIDE: 900 INJECTION, SOLUTION INTRAVENOUS at 00:30

## 2021-02-26 RX ADMIN — LACTULOSE 20 G: 20 SOLUTION ORAL at 02:53

## 2021-02-26 RX ADMIN — IOPAMIDOL 85 ML: 755 INJECTION, SOLUTION INTRAVENOUS at 01:16

## 2021-02-26 ASSESSMENT — ENCOUNTER SYMPTOMS
CARDIOVASCULAR NEGATIVE: 1
BACK PAIN: 1
NERVOUS/ANXIOUS: 1
ABDOMINAL PAIN: 1
RESPIRATORY NEGATIVE: 1
DYSPHORIC MOOD: 1

## 2021-02-26 NOTE — ED NOTES
Patient was accepted at shift change signout with a plan for me to follow-up on her ammonia as well as her abdominal CT.  Her ammonia was mildly elevated at 60.  Per the discussion at the time of signout, the patient is not showing any signs of encephalopathy, and does not need to be admitted for mildly elevated ammonia.  CT showed sign of constipation.  I did discuss this with her.  She was given an extra dose of lactulose here and she is directed to take an extra dose of lactulose every day for the next week as this will help both her ammonia and her constipation.  She does have an incidentally noted small pulmonary nodule which is unchanged from previous.  She was told about this and instructed she needs to follow-up as she may need repeat studies in several months to assess stability.  Again, she is encouraged to follow-up within the next week, return to the ER with any concerns.  She verbalizes understanding and is agreeable to the plan.    Dictation Disclaimer: Some of this Note has been completed with voice-recognition dictation software. Although errors are generally corrected real-time, there is the potential for a rare error to be present in the completed chart.       Shelby Coombs MD  02/26/21 0259

## 2021-02-26 NOTE — ED TRIAGE NOTES
Pt arrives by private car after MD told her that her liver function labs came back abnormal and that she should be assessed at ED.

## 2021-02-26 NOTE — CONFIDENTIAL NOTE
Pt had PCP appointment on 2/25 and was instructed to present to the ED for abnormal lab results and reports of worsening fatigue, brain fog, nausea/vomiting, and lower back/abdominal pain (Na-124, Ca-8.0, Glucose-535, and ammonia- 96). In the emergency room, Na was 131, Calcium 8.8, and ammonia 60. Pt had CT of abdomen and pelvis performed in the ED which showed moderate gas and stool burden. Pt was discharged to home with instructions to follow up with endocrinologist for diabetes management and hepatology.      Connected with pt to check in and discuss plan of care. Pt confirmed that she is still taking lactulose 30 ml 3 times daily and having 3-4 BMs/day. Discussed pt's CT and large stool burden despite lactulose regimen. Encouraged pt to increase lactulose to 4 times daily and/or leave lactulose at 3 times daily and add miralax 17 grams daily to achieve 4-5 BMs/day.     Pt's PCP has increased lantus to 12 units daily and added short acting insulin to regimen. PCP plans to follow up and review blood glucoses on 3/1 and adjust as needed.     Discussed with pt that brain fog, increased fatigue, and nausea/vomiting may be due to hyponatremia. Reviewed pt's diuretic regimen and pt confirmed that she is taking furosemide 40 mg 2 times daily and spironolactone 200 mg daily. Denied ascites and lower extremity edema.    Message sent to Dr. Cook reviewing pt's symptoms and labs and seeking recommendations for change to plan of care. Plan to check in with pt in 3-4 days. Pt verbalized understanding and is agreeable to plan of care.

## 2021-02-26 NOTE — DISCHARGE INSTRUCTIONS
Your liver function tests are normal  Start the new protocol arranged by your endocrinologist and keep in touch with them.  I have not found a reason for your fatigue and somnolence  Follow-up with your primary care provider and Dr. Cook. Mention that you were found to have a pulmonary nodule, as this may need to be rechecked in several months.   Take an extra dose of lactulose every day for the next week to see if this helps.

## 2021-03-02 RX ORDER — RIFAMPIN 300 MG/1
300 CAPSULE ORAL DAILY
Qty: 30 CAPSULE | Refills: 5 | Status: SHIPPED | OUTPATIENT
Start: 2021-03-02 | End: 2021-08-26

## 2021-03-02 NOTE — CONFIDENTIAL NOTE
Per Dr. Cook, pt's hyponatremia is due poor diabetic control. No changes to diuretic regimen made at this time.     Notified pt of Dr. Cook's response to hyponatremia and that symptoms will hopefully improve with better diabetes management. Pt reported that blood sugars are all over the place despite changes to diabetic regimen. Pt has multiple follow up appointments with endocrine in the next 1-2 weeks.     Pt's pruritis has not improved with addition of hydroxyzine, avoiding deodorant soaps, and using eucerin cream. Pt stated that the itching is the worst at night time. Message sent to Dr. Cook requesting additional recommendations and rifampin 300 mg daily prescribed.     Pt increased bowel regimen and is now taking lactulose 30 ml 3 times daily and miralax once daily. Pt is still having 3-4 BMs/day and has not noticed any improvement in mentation. Encouraged pt to increase miralax to 17 grams 2 times daily (in addition to lactulose 3 times daily). Reviewed that pt can always decrease miralax if consistently having more than 5 BMs/day and/or intolerable gas or bloating.     Plan to check in with pt in 1 week. Pt verbalized understanding and is agreeable to plan of care.

## 2021-03-04 ENCOUNTER — TELEPHONE (OUTPATIENT)
Dept: GASTROENTEROLOGY | Facility: CLINIC | Age: 51
End: 2021-03-04

## 2021-03-04 DIAGNOSIS — K76.82 HEPATIC ENCEPHALOPATHY (H): Primary | ICD-10-CM

## 2021-03-04 NOTE — TELEPHONE ENCOUNTER
Central Prior Authorization Team   Phone: [phone]    PA Initiation    Medication: xifaxan 550 mg tablets   Insurance Company: [employer] - Phone [phone] Fax [phone]  Pharmacy Filling the Rx: [id number] [place] - [address] [address]  Filling Pharmacy Phone: [phone]  Filling Pharmacy Fax:    Start Date: 3/4/2021

## 2021-03-04 NOTE — TELEPHONE ENCOUNTER
Prior Authorization Specialty Medication Request    Medication/Dose: xifaxan 550 mg tablets   ICD code (if different than what is on RX):  K72.90  Previously Tried and Failed:  Lactulose alone    Important Lab Values:   Rationale: Xifaxan helps to lower the toxin build up in the blood while lactulose works by cleansing the toxin build up in the liver. Adjunctive therapy: http://onlinelibrary.darden.com/doi/10.1111/fuad.94972/full       Insurance Name:   Insurance ID:   Insurance Phone Number:     Pharmacy Information (if different than what is on RX)  Name:    Phone:

## 2021-03-08 ENCOUNTER — MYC MEDICAL ADVICE (OUTPATIENT)
Dept: GASTROENTEROLOGY | Facility: CLINIC | Age: 51
End: 2021-03-08

## 2021-03-08 DIAGNOSIS — K74.60 LIVER CIRRHOSIS SECONDARY TO NASH (H): Primary | ICD-10-CM

## 2021-03-08 DIAGNOSIS — K75.81 LIVER CIRRHOSIS SECONDARY TO NASH (H): Primary | ICD-10-CM

## 2021-03-10 NOTE — TELEPHONE ENCOUNTER
Prior Authorization Not Needed per Insurance          Medication: xifaxan 550 mg tablets   Insurance Company: [employer] - Phone [phone] Fax [phone]  Expected CoPay:      [id number] [place] [address]  Pharmacy Notified: Yes - left voicemail to call me back if not able to get rx processed, as insurance states it is covered.  Patient Notified: No

## 2021-03-14 ENCOUNTER — HEALTH MAINTENANCE LETTER (OUTPATIENT)
Age: 51
End: 2021-03-14

## 2021-03-16 ENCOUNTER — PATIENT OUTREACH (OUTPATIENT)
Dept: GASTROENTEROLOGY | Facility: CLINIC | Age: 51
End: 2021-03-16

## 2021-03-16 DIAGNOSIS — K74.60 LIVER CIRRHOSIS SECONDARY TO NASH (H): Primary | ICD-10-CM

## 2021-03-16 DIAGNOSIS — K75.81 LIVER CIRRHOSIS SECONDARY TO NASH (H): Primary | ICD-10-CM

## 2021-03-16 NOTE — PROGRESS NOTES
Pt reported increased abdominal bloating and pressure. Pt is unsure whether increased distension is from gas or ascites. Pt is taking lactulose 30 ml 3 times daily and miralax once daily and is averaging 3 BMs/day. Pt has been compliant with diuretics and adhering to 2,000 mg sodium restricted diet. Pt had CT of abdomen and pelvis with contrast on 2/26 and no ascites was noted.     Encouraged pt to titrate lactulose and miralax to achieve 4-5 BMs to see if this helps with abdominal distension. Also reviewed that pt could try simethicone as needed for gas.     Pt has an in person appointment with Dr. Cook on 3/23 and will get an abdominal ultrasound to assess for ascites while at the clinic.      Pt verbalized understanding and is agreeable to plan of care. Pt has a lab appointment scheduled on 3/19.

## 2021-03-19 ENCOUNTER — ANCILLARY PROCEDURE (OUTPATIENT)
Dept: ULTRASOUND IMAGING | Facility: CLINIC | Age: 51
End: 2021-03-19
Attending: INTERNAL MEDICINE
Payer: COMMERCIAL

## 2021-03-19 DIAGNOSIS — K74.60 LIVER CIRRHOSIS SECONDARY TO NASH (H): ICD-10-CM

## 2021-03-19 DIAGNOSIS — K75.81 LIVER CIRRHOSIS SECONDARY TO NASH (H): ICD-10-CM

## 2021-03-19 LAB
ALBUMIN SERPL-MCNC: 2.8 G/DL (ref 3.4–5)
ALP SERPL-CCNC: 107 U/L (ref 40–150)
ALT SERPL W P-5'-P-CCNC: 31 U/L (ref 0–50)
ANION GAP SERPL CALCULATED.3IONS-SCNC: 1 MMOL/L (ref 3–14)
AST SERPL W P-5'-P-CCNC: 45 U/L (ref 0–45)
BILIRUB DIRECT SERPL-MCNC: 0.3 MG/DL (ref 0–0.2)
BILIRUB SERPL-MCNC: 0.7 MG/DL (ref 0.2–1.3)
BUN SERPL-MCNC: 9 MG/DL (ref 7–30)
CALCIUM SERPL-MCNC: 8.6 MG/DL (ref 8.5–10.1)
CHLORIDE SERPL-SCNC: 97 MMOL/L (ref 94–109)
CO2 SERPL-SCNC: 37 MMOL/L (ref 20–32)
CREAT SERPL-MCNC: 1.03 MG/DL (ref 0.52–1.04)
ERYTHROCYTE [DISTWIDTH] IN BLOOD BY AUTOMATED COUNT: 13.8 % (ref 10–15)
GFR SERPL CREATININE-BSD FRML MDRD: 64 ML/MIN/{1.73_M2}
GLUCOSE SERPL-MCNC: 136 MG/DL (ref 70–99)
HCT VFR BLD AUTO: 35.2 % (ref 35–47)
HGB BLD-MCNC: 11.4 G/DL (ref 11.7–15.7)
INR PPP: 1.09 (ref 0.86–1.14)
MCH RBC QN AUTO: 33 PG (ref 26.5–33)
MCHC RBC AUTO-ENTMCNC: 32.4 G/DL (ref 31.5–36.5)
MCV RBC AUTO: 102 FL (ref 78–100)
PLATELET # BLD AUTO: 104 10E9/L (ref 150–450)
POTASSIUM SERPL-SCNC: 3.7 MMOL/L (ref 3.4–5.3)
PROT SERPL-MCNC: 7.5 G/DL (ref 6.8–8.8)
RBC # BLD AUTO: 3.45 10E12/L (ref 3.8–5.2)
SODIUM SERPL-SCNC: 135 MMOL/L (ref 133–144)
WBC # BLD AUTO: 4.6 10E9/L (ref 4–11)

## 2021-03-19 PROCEDURE — 85027 COMPLETE CBC AUTOMATED: CPT | Performed by: INTERNAL MEDICINE

## 2021-03-19 PROCEDURE — 80048 BASIC METABOLIC PNL TOTAL CA: CPT | Performed by: INTERNAL MEDICINE

## 2021-03-19 PROCEDURE — 80076 HEPATIC FUNCTION PANEL: CPT | Performed by: INTERNAL MEDICINE

## 2021-03-19 PROCEDURE — 85610 PROTHROMBIN TIME: CPT | Performed by: INTERNAL MEDICINE

## 2021-03-19 PROCEDURE — 76705 ECHO EXAM OF ABDOMEN: CPT | Performed by: RADIOLOGY

## 2021-03-19 PROCEDURE — 36415 COLL VENOUS BLD VENIPUNCTURE: CPT | Performed by: INTERNAL MEDICINE

## 2021-03-28 ENCOUNTER — TELEPHONE (OUTPATIENT)
Dept: TRANSPLANT | Facility: CLINIC | Age: 51
End: 2021-03-28

## 2021-03-28 NOTE — TELEPHONE ENCOUNTER
Pt paged called having on going issue with abdomen distended. Pt did have scan at outside hospital and barely had any ascites. No para needed. Pt is constipated. Pt has gained 16 pounds in the last 3 weeks. Pt did see PCP on Friday. Pt does have leg swelling. Pt does have indentation.   Pt has been up and walking.     Pt is crying that she has concerns and feels she is being blown off. Pt does not have compression stockings.      Pt stopped lactulose at [facility]. Pt is using miralax three times a day. Pt is having 3-5 bowel movements a day. They are loose. Pt is taking gas-x and not helping with gas and bloating.     Pt does not have fevers. Pt aware the RNCM will reach out on Monday to further discuss if any changes can be made to meds and if can get compression stockings

## 2021-03-29 ENCOUNTER — TELEPHONE (OUTPATIENT)
Dept: GASTROENTEROLOGY | Facility: CLINIC | Age: 51
End: 2021-03-29

## 2021-03-29 ENCOUNTER — DOCUMENTATION ONLY (OUTPATIENT)
Dept: TRANSPLANT | Facility: CLINIC | Age: 51
End: 2021-03-29

## 2021-03-30 ENCOUNTER — TELEPHONE (OUTPATIENT)
Dept: TRANSPLANT | Facility: CLINIC | Age: 51
End: 2021-03-30

## 2021-03-30 NOTE — TELEPHONE ENCOUNTER
"Spoke with Susan on the phone, admitted overnight to [facility]. She reports increase SOB and is \"just miserable\"    Spoke with patient last night around 830 pm, denied SOB at that time.  Said approx 15-20 minutes after we spoke she developed dry cough and SOB.  Stated she called main hospital number to speak to coordinator on call, did not hear back, and call was referred to GI on call.  Do not see documentation between my call with her at 8:30 p.m. and when she spoke to GI doctor at 10 pm.      Per CE-  Small R pleural effusion on CXR.  MELD labs stable.  Scant ascites on ultrasound.  Per pt, she reports she is not currently on oxygen  And received dose of IV diuretics in ER.      Copied to Dr. Cook.    "

## 2021-03-30 NOTE — TELEPHONE ENCOUNTER
Pt is enraged that she cannot speak to a transplant coordinator.     She is short of breath, her heart beat is racing, and she feels like she's been ignored.     I told her that she should go to the ED to get checked out about her cough.     Lorena Chavez MD PhD   Gastroenterology Fellow

## 2021-03-30 NOTE — CONFIDENTIAL NOTE
Followed up with Susan.  Patient expressed frustration that her concerns not heard last week, supportive listening utilized. Patient very tearful.      Patient feels more uncomfortable/increased bloating.    Alert and oriented x 4, remaining active including hiking at [place]    Having 3 bowel mvmts/day.  Only taking Miralax.  Reinforced using lactulose and move toward having closer to 5 BMs per day as stool showing on recent imagine and pt reports feeling constipated, scant ascites on last CT.  Also taking simethicone for gas pain.  Encouraged her to use heating pad (while watching to make sure no burns/no impaired circulation.)    Increased BLE edema per pt report, patient will obtain MARTHA stockings from local pharmacy, [employer], etc.    Encouraged maintaining low Na+, high protein and activity    Patient missed last appointment with Dr. Gaines, encouraged her to reschedule for additional support.  Patient is also participating in liver txp support group here.      Patient has follow up appt with Dr. Cook on 4/7/21

## 2021-04-02 ENCOUNTER — DOCUMENTATION ONLY (OUTPATIENT)
Dept: TRANSPLANT | Facility: CLINIC | Age: 51
End: 2021-04-02

## 2021-04-02 NOTE — PROGRESS NOTES
Susan called with update- still at outside hospital.  Did have thoracentesis yesterday, not on  any oxygen, some coughing but SOB improved.  However, states that she is still 'just miserable' and wanted to know if her MELD score was higher, tearful.  MELD stable at 8-9 with outside hospital labs.  Per patient, outside hospital sending records to [place], anticipated discharge possibly tomorrow and has follow up with [place] already scheduled on 4/7/21    Discussed again with patient that goals are sx management now- outside hospital currently working on increasing her stool output, thor completed, and she did not have enough ascites for para. She is continuing to pursue living donor possibilities.  Encouraged to reschedule with Dr. Gaines for additional support.

## 2021-04-05 NOTE — PROGRESS NOTES
Hepatology Follow-up Clinic note  Susan Puckett   Date of Birth 1970  Date of Service 12/9/2020    I had the pleasure of seeing Susan Puckett for followup in the Liver Transplant Clinic at the [facility] on 04/07/2021.        Ms. Puckett returns for followup of cirrhosis, most likely caused by nonalcoholic fatty liver disease.  She is status post gastric bypass for obesity.      She just was discharged from [facility] where she was hospitalized with worsening pleural effusions.  She had a large effusion on the right that was tapped and they drained about a liter and a much smaller fusion was not tapped on the left.  She also had an ultrasound of her abdomen that showed trivial ascites.      At present, she denies any abdominal pain.  She does complain of some abdominal distention.  She denies any itching or skin rash.  She does have a moderate amount of fatigue.  She does not have any lower extremity edema.  She has not had any gastrointestinal bleeding or any overt signs of hepatic encephalopathy.      She denies any fevers or chills or cough.  Her shortness of breath is better since the pleural effusion was tapped.  She denies any nausea or vomiting and is having 3-4 bowel movements per day.  Her appetite is adequate, and her weight has remained the same for the most part.     Medical hx Surgical hx   Past Medical History:   Diagnosis Date     Depressive disorder      Diabetes (H)      History of blood transfusion      Liver cirrhosis secondary to CUETO (H) 05/28/2020     Thyroid disease     Past Surgical History:   Procedure Laterality Date     APPENDECTOMY      1989     COLONOSCOPY      1/2020 at [facility]      ENT SURGERY       GI SURGERY      EGD August 2020 at [facility]      GYN SURGERY      2010     RNY gastric bypass  01/2006    retoocolic, retrogastric, [facility]     VASCULAR SURGERY               Family History:   - Mother: Renal cancer         Social History:   -  "Alcohol: Denies history of significant use  - Tobacco: Denies         Medications:     Current Outpatient Medications   Medication     atorvastatin (LIPITOR) 20 MG tablet     calcium carbonate (TUMS) 500 MG chewable tablet     calcium citrate-vitamin D (CITRACAL W/D) 250-100 MG-UNIT tablet     cholecalciferol (VITAMIN D-1000 MAX ST) 25 MCG (1000 UT) TABS     cyanocobalamin (VITAMIN B-12) 1000 MCG tablet     Ferrous Sulfate (IRON) 325 (65 FE) MG tablet     folic acid (FOLVITE) 1 MG tablet     furosemide (LASIX) 40 MG tablet     lactulose 20 GM/30ML SOLN     levothyroxine (SYNTHROID/LEVOTHROID) 175 MCG tablet     multivitamin (DEKAS ESSENTIAL) capsule     omeprazole (PRILOSEC) 20 MG DR capsule     ondansetron (ZOFRAN-ODT) 4 MG ODT tab     prochlorperazine (COMPAZINE) 5 MG tablet     rifampin (RIFADIN) 300 MG capsule     rifaximin (XIFAXAN) 550 MG TABS tablet     sitagliptin (JANUVIA) 100 MG tablet     spironolactone (ALDACTONE) 100 MG tablet     SUMAtriptan (IMITREX) 50 MG tablet     traZODone (DESYREL) 100 MG tablet     valACYclovir (VALTREX) 1000 mg tablet     venlafaxine (EFFEXOR) 75 MG tablet     vitamin A 3 MG (65175 UNITS) capsule     vitamin C (ASCORBIC ACID) 100 MG tablet     vitamin D3 (CHOLECALCIFEROL) 50 mcg (2000 units) tablet     vitamin E (TOCOPHEROL) 400 units (180 mg) capsule     Vitamin K, Phytonadione, 100 MCG TABS     No current facility-administered medications for this visit.             Allergies:     Allergies   Allergen Reactions     Prednisone      Nsaids Other (See Comments)          Review of Systems:   10 points ROS was obtained and highlighted in the HPI, otherwise negative.          Physical Exam:   /71 (BP Location: Right arm, Patient Position: Sitting, Cuff Size: Adult Regular)   Pulse 81   Temp 98.3  F (36.8  C) (Oral)   Resp 18   Ht 1.676 m (5' 6\")   Wt 67.7 kg (149 lb 4.8 oz)   SpO2 98%   BMI 24.10 kg/m      PHYSICAL EXAMINATION:  HEENT exam shows no scleral icterus or " temporal muscle wasting.  Her chest is clear.  Abdominal exam shows no obvious ascites.  No masses or tenderness to palpation are present.  Her liver is 10 cm in span without left lobe enlargement.  No spleen tip is palpable.  Extremity exam  shows no edema.  Skin exam shows no stigmata of chronic liver disease.  Neurologic exam shows no asterixis.          Data:     BMP  Recent Labs   Lab Test 03/19/21  1009 02/25/21 1959 01/19/21  0856 12/08/20  0647    131* 143 135   POTASSIUM 3.7 4.1 3.8 3.9   CHLORIDE 97 97 105 102   MAURI 8.6 8.8 8.2* 9.1   CO2 37* 27 34* 25   BUN 9 15 12 9   CR 1.03 1.07* 1.15* 0.99   * 565* 145* 157*     CBC  Recent Labs   Lab Test 03/19/21  1009 02/25/21 2209 01/19/21  0856 12/08/20  0647   WBC 4.6 4.9 3.7* 3.8*   RBC 3.45* 3.06* 3.07* 3.13*   HGB 11.4* 10.1* 10.4* 10.7*   HCT 35.2 29.6* 31.8* 32.5*   * 97 104* 104*   MCH 33.0 33.0 33.9* 34.2*   MCHC 32.4 34.1 32.7 32.9   RDW 13.8 14.1 14.0 12.8   * 77* 104* 110*     Liver Function Studies   Recent Labs   Lab Test 03/19/21  1009   PROTTOTAL 7.5   ALBUMIN 2.8*   BILITOTAL 0.7   ALKPHOS 107   AST 45   ALT 31      INR   INR   Date Value Ref Range Status   03/19/2021 1.09 0.86 - 1.14 Final        CT Abdomen Pelvis 2/2021:  IMPRESSION:   1.  Evidence for constipation without bowel obstruction.  2.  Evidence for hepatic cirrhosis as on prior  3.  Remainder stable. See guidelines below for pulmonary nodule recommendations.  * Perisplenic varices.    EGD 4/2021:  Impression:          - Normal examined jejunum.                       - Faustino-en-Y gastrojejunostomy with                        gastrojejunal anastomosis                        characterized by healthy appearing                        mucosa.                       - Gastric-enteric fistula.                       - Grade I esophageal varices.                       - No specimens collected.  Recommendation:      - Low sodium diet.    Colonoscopy  1/2021:  Impression:          - The examined portion of the ileum                        was normal.                       - Mild diffuse mucosal congestion                        with scattered mild hyperemic areas.                        Suggestive of portal colopathy. No                        apparent colitis. Biopsies obtained                        to look for microscopic colitis.                       - One 10 mm polyp in the sigmoid                        colon, removed with a hot snare.                        Resected and retrieved.                       - The examination was otherwise                        normal.    IMPRESSION AND PLAN:  My impression is that Ms. Puckett has cirrhosis caused by nonalcoholic fatty liver disease.  Her MELD score has only been 8-9, thus she is not a high priority for transplantation.  She does have a number of people that are undergoing a live donor evaluation of which at least 2 are still active as a potential candidate.      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  I will get her set up to see Interventional Radiology.  In the meantime, we will see how quickly the fluid recurs.  Certainly if she starts to get to the point where she is requiring a thoracentesis every 1-2 weeks, I do think we should go forward with that.  I did discuss with her at length what the procedure involves and the potential complications of encephalopathy.        She has been vaccinated against COVID-19 and is otherwise up to date with regard to screening and other vaccinations.        My plan will be to see her back in the clinic in 3 months.      Thank you very much for allowing me to participate in the care of this patient.  If you have any questions regarding my recommendations, please do not hesitate to contact me.         Raphael Cook MD      Professor of Medicine  [employer]      Executive Medical Director, Solid  Organ Transplant Program  [facility] .

## 2021-04-07 ENCOUNTER — OFFICE VISIT (OUTPATIENT)
Dept: GASTROENTEROLOGY | Facility: CLINIC | Age: 51
End: 2021-04-07
Attending: INTERNAL MEDICINE
Payer: COMMERCIAL

## 2021-04-07 VITALS
WEIGHT: 149.3 LBS | OXYGEN SATURATION: 98 % | TEMPERATURE: 98.3 F | HEART RATE: 81 BPM | BODY MASS INDEX: 23.99 KG/M2 | SYSTOLIC BLOOD PRESSURE: 104 MMHG | RESPIRATION RATE: 18 BRPM | HEIGHT: 66 IN | DIASTOLIC BLOOD PRESSURE: 71 MMHG

## 2021-04-07 DIAGNOSIS — K75.81 LIVER CIRRHOSIS SECONDARY TO NASH (H): Primary | ICD-10-CM

## 2021-04-07 DIAGNOSIS — K74.60 LIVER CIRRHOSIS SECONDARY TO NASH (H): Primary | ICD-10-CM

## 2021-04-07 PROCEDURE — G0463 HOSPITAL OUTPT CLINIC VISIT: HCPCS

## 2021-04-07 PROCEDURE — 99214 OFFICE O/P EST MOD 30 MIN: CPT | Performed by: INTERNAL MEDICINE

## 2021-04-07 RX ORDER — SIMETHICONE 80 MG
80 TABLET,CHEWABLE ORAL EVERY 6 HOURS PRN
Status: ON HOLD | COMMUNITY
End: 2022-02-15

## 2021-04-07 RX ORDER — POLYETHYLENE GLYCOL 3350 17 G/17G
1 POWDER, FOR SOLUTION ORAL DAILY PRN
Status: ON HOLD | COMMUNITY
End: 2021-12-28

## 2021-04-07 RX ORDER — HYDROXYZINE HYDROCHLORIDE 25 MG/1
25 TABLET, FILM COATED ORAL 3 TIMES DAILY PRN
COMMUNITY
End: 2022-02-28

## 2021-04-07 ASSESSMENT — PAIN SCALES - GENERAL: PAINLEVEL: NO PAIN (1)

## 2021-04-07 ASSESSMENT — MIFFLIN-ST. JEOR: SCORE: 1323.97

## 2021-04-07 NOTE — LETTER
4/7/2021         RE: Susan Puckett  [address] [address]        Dear Colleague,    Thank you for referring your patient, Susan Puckett, to the [facility] [place]. Please see a copy of my visit note below.    Hepatology Follow-up Clinic note  Susan Puckett   Date of Birth 1970  Date of Service 12/9/2020    I had the pleasure of seeing Susan Puckett for followup in the Liver Transplant Clinic at the [facility] on 04/07/2021.        Ms. Puckett returns for followup of cirrhosis, most likely caused by nonalcoholic fatty liver disease.  She is status post gastric bypass for obesity.      She just was discharged from [facility] where she was hospitalized with worsening pleural effusions.  She had a large effusion on the right that was tapped and they drained about a liter and a much smaller fusion was not tapped on the left.  She also had an ultrasound of her abdomen that showed trivial ascites.      At present, she denies any abdominal pain.  She does complain of some abdominal distention.  She denies any itching or skin rash.  She does have a moderate amount of fatigue.  She does not have any lower extremity edema.  She has not had any gastrointestinal bleeding or any overt signs of hepatic encephalopathy.      She denies any fevers or chills or cough.  Her shortness of breath is better since the pleural effusion was tapped.  She denies any nausea or vomiting and is having 3-4 bowel movements per day.  Her appetite is adequate, and her weight has remained the same for the most part.     Medical hx Surgical hx   Past Medical History:   Diagnosis Date     Depressive disorder      Diabetes (H)      History of blood transfusion      Liver cirrhosis secondary to CUETO (H) 05/28/2020     Thyroid disease     Past Surgical History:   Procedure Laterality Date     APPENDECTOMY      1989     COLONOSCOPY      1/2020 at [facility]      ENT  SURGERY       GI SURGERY      EGD August 2020 at [facility]      GYN SURGERY      2010     RNY gastric bypass  01/2006    retoocolic, retrogastric, [facility]     VASCULAR SURGERY               Family History:   - Mother: Renal cancer         Social History:   - Alcohol: Denies history of significant use  - Tobacco: Denies         Medications:     Current Outpatient Medications   Medication     atorvastatin (LIPITOR) 20 MG tablet     calcium carbonate (TUMS) 500 MG chewable tablet     calcium citrate-vitamin D (CITRACAL W/D) 250-100 MG-UNIT tablet     cholecalciferol (VITAMIN D-1000 MAX ST) 25 MCG (1000 UT) TABS     cyanocobalamin (VITAMIN B-12) 1000 MCG tablet     Ferrous Sulfate (IRON) 325 (65 FE) MG tablet     folic acid (FOLVITE) 1 MG tablet     furosemide (LASIX) 40 MG tablet     lactulose 20 GM/30ML SOLN     levothyroxine (SYNTHROID/LEVOTHROID) 175 MCG tablet     multivitamin (DEKAS ESSENTIAL) capsule     omeprazole (PRILOSEC) 20 MG DR capsule     ondansetron (ZOFRAN-ODT) 4 MG ODT tab     prochlorperazine (COMPAZINE) 5 MG tablet     rifampin (RIFADIN) 300 MG capsule     rifaximin (XIFAXAN) 550 MG TABS tablet     sitagliptin (JANUVIA) 100 MG tablet     spironolactone (ALDACTONE) 100 MG tablet     SUMAtriptan (IMITREX) 50 MG tablet     traZODone (DESYREL) 100 MG tablet     valACYclovir (VALTREX) 1000 mg tablet     venlafaxine (EFFEXOR) 75 MG tablet     vitamin A 3 MG (97375 UNITS) capsule     vitamin C (ASCORBIC ACID) 100 MG tablet     vitamin D3 (CHOLECALCIFEROL) 50 mcg (2000 units) tablet     vitamin E (TOCOPHEROL) 400 units (180 mg) capsule     Vitamin K, Phytonadione, 100 MCG TABS     No current facility-administered medications for this visit.             Allergies:     Allergies   Allergen Reactions     Prednisone      Nsaids Other (See Comments)          Review of Systems:   10 points ROS was obtained and highlighted in the HPI, otherwise negative.          Physical Exam:   /71 (BP Location: Right  "arm, Patient Position: Sitting, Cuff Size: Adult Regular)   Pulse 81   Temp 98.3  F (36.8  C) (Oral)   Resp 18   Ht 1.676 m (5' 6\")   Wt 67.7 kg (149 lb 4.8 oz)   SpO2 98%   BMI 24.10 kg/m      PHYSICAL EXAMINATION:  HEENT exam shows no scleral icterus or temporal muscle wasting.  Her chest is clear.  Abdominal exam shows no obvious ascites.  No masses or tenderness to palpation are present.  Her liver is 10 cm in span without left lobe enlargement.  No spleen tip is palpable.  Extremity exam  shows no edema.  Skin exam shows no stigmata of chronic liver disease.  Neurologic exam shows no asterixis.          Data:     BMP  Recent Labs   Lab Test 03/19/21  1009 02/25/21 1959 01/19/21  0856 12/08/20  0647    131* 143 135   POTASSIUM 3.7 4.1 3.8 3.9   CHLORIDE 97 97 105 102   MAURI 8.6 8.8 8.2* 9.1   CO2 37* 27 34* 25   BUN 9 15 12 9   CR 1.03 1.07* 1.15* 0.99   * 565* 145* 157*     CBC  Recent Labs   Lab Test 03/19/21  1009 02/25/21 2209 01/19/21  0856 12/08/20  0647   WBC 4.6 4.9 3.7* 3.8*   RBC 3.45* 3.06* 3.07* 3.13*   HGB 11.4* 10.1* 10.4* 10.7*   HCT 35.2 29.6* 31.8* 32.5*   * 97 104* 104*   MCH 33.0 33.0 33.9* 34.2*   MCHC 32.4 34.1 32.7 32.9   RDW 13.8 14.1 14.0 12.8   * 77* 104* 110*     Liver Function Studies   Recent Labs   Lab Test 03/19/21  1009   PROTTOTAL 7.5   ALBUMIN 2.8*   BILITOTAL 0.7   ALKPHOS 107   AST 45   ALT 31      INR   INR   Date Value Ref Range Status   03/19/2021 1.09 0.86 - 1.14 Final        CT Abdomen Pelvis 2/2021:  IMPRESSION:   1.  Evidence for constipation without bowel obstruction.  2.  Evidence for hepatic cirrhosis as on prior  3.  Remainder stable. See guidelines below for pulmonary nodule recommendations.  * Perisplenic varices.    EGD 4/2021:  Impression:          - Normal examined jejunum.                       - Faustino-en-Y gastrojejunostomy with                        gastrojejunal anastomosis                        characterized by healthy " appearing                        mucosa.                       - Gastric-enteric fistula.                       - Grade I esophageal varices.                       - No specimens collected.  Recommendation:      - Low sodium diet.    Colonoscopy 1/2021:  Impression:          - The examined portion of the ileum                        was normal.                       - Mild diffuse mucosal congestion                        with scattered mild hyperemic areas.                        Suggestive of portal colopathy. No                        apparent colitis. Biopsies obtained                        to look for microscopic colitis.                       - One 10 mm polyp in the sigmoid                        colon, removed with a hot snare.                        Resected and retrieved.                       - The examination was otherwise                        normal.    IMPRESSION AND PLAN:  My impression is that Ms. Puckett has cirrhosis caused by nonalcoholic fatty liver disease.  Her MELD score has only been 8-9, thus she is not a high priority for transplantation.  She does have a number of people that are undergoing a live donor evaluation of which at least 2 are still active as a potential candidate.      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  I will get her set up to see Interventional Radiology.  In the meantime, we will see how quickly the fluid recurs.  Certainly if she starts to get to the point where she is requiring a thoracentesis every 1-2 weeks, I do think we should go forward with that.  I did discuss with her at length what the procedure involves and the potential complications of encephalopathy.        She has been vaccinated against COVID-19 and is otherwise up to date with regard to screening and other vaccinations.        My plan will be to see her back in the clinic in 3 months.      Thank you very much for allowing me to participate in the care  of this patient.  If you have any questions regarding my recommendations, please do not hesitate to contact me.         Raphael Cook MD      Professor of Medicine  [employer]      Executive Medical Director, Solid Organ Transplant Program  [facility] .

## 2021-04-07 NOTE — LETTER
4/7/2021         RE: Susan Puckett  [address] [facility] [address]      Hepatology Follow-up Clinic note  Susan Puckett   Date of Birth 1970  Date of Service 12/9/2020    I had the pleasure of seeing Susan Puckett for followup in the Liver Transplant Clinic at the [facility] on 04/07/2021.        Ms. Puckett returns for followup of cirrhosis, most likely caused by nonalcoholic fatty liver disease.  She is status post gastric bypass for obesity.      She just was discharged from [facility] where she was hospitalized with worsening pleural effusions.  She had a large effusion on the right that was tapped and they drained about a liter and a much smaller fusion was not tapped on the left.  She also had an ultrasound of her abdomen that showed trivial ascites.      At present, she denies any abdominal pain.  She does complain of some abdominal distention.  She denies any itching or skin rash.  She does have a moderate amount of fatigue.  She does not have any lower extremity edema.  She has not had any gastrointestinal bleeding or any overt signs of hepatic encephalopathy.      She denies any fevers or chills or cough.  Her shortness of breath is better since the pleural effusion was tapped.  She denies any nausea or vomiting and is having 3-4 bowel movements per day.  Her appetite is adequate, and her weight has remained the same for the most part.     Medical hx Surgical hx   Past Medical History:   Diagnosis Date     Depressive disorder      Diabetes (H)      History of blood transfusion      Liver cirrhosis secondary to CUETO (H) 05/28/2020     Thyroid disease     Past Surgical History:   Procedure Laterality Date     APPENDECTOMY      1989     COLONOSCOPY      1/2020 at [facility]      ENT SURGERY       GI SURGERY      EGD August 2020 at [facility]      GYN SURGERY      2010     RNY gastric bypass  01/2006    retoocolic, retrogastric, [facility]     VASCULAR  "SURGERY               Family History:   - Mother: Renal cancer         Social History:   - Alcohol: Denies history of significant use  - Tobacco: Denies         Medications:     Current Outpatient Medications   Medication     atorvastatin (LIPITOR) 20 MG tablet     calcium carbonate (TUMS) 500 MG chewable tablet     calcium citrate-vitamin D (CITRACAL W/D) 250-100 MG-UNIT tablet     cholecalciferol (VITAMIN D-1000 MAX ST) 25 MCG (1000 UT) TABS     cyanocobalamin (VITAMIN B-12) 1000 MCG tablet     Ferrous Sulfate (IRON) 325 (65 FE) MG tablet     folic acid (FOLVITE) 1 MG tablet     furosemide (LASIX) 40 MG tablet     lactulose 20 GM/30ML SOLN     levothyroxine (SYNTHROID/LEVOTHROID) 175 MCG tablet     multivitamin (DEKAS ESSENTIAL) capsule     omeprazole (PRILOSEC) 20 MG DR capsule     ondansetron (ZOFRAN-ODT) 4 MG ODT tab     prochlorperazine (COMPAZINE) 5 MG tablet     rifampin (RIFADIN) 300 MG capsule     rifaximin (XIFAXAN) 550 MG TABS tablet     sitagliptin (JANUVIA) 100 MG tablet     spironolactone (ALDACTONE) 100 MG tablet     SUMAtriptan (IMITREX) 50 MG tablet     traZODone (DESYREL) 100 MG tablet     valACYclovir (VALTREX) 1000 mg tablet     venlafaxine (EFFEXOR) 75 MG tablet     vitamin A 3 MG (93983 UNITS) capsule     vitamin C (ASCORBIC ACID) 100 MG tablet     vitamin D3 (CHOLECALCIFEROL) 50 mcg (2000 units) tablet     vitamin E (TOCOPHEROL) 400 units (180 mg) capsule     Vitamin K, Phytonadione, 100 MCG TABS     No current facility-administered medications for this visit.             Allergies:     Allergies   Allergen Reactions     Prednisone      Nsaids Other (See Comments)          Review of Systems:   10 points ROS was obtained and highlighted in the HPI, otherwise negative.          Physical Exam:   /71 (BP Location: Right arm, Patient Position: Sitting, Cuff Size: Adult Regular)   Pulse 81   Temp 98.3  F (36.8  C) (Oral)   Resp 18   Ht 1.676 m (5' 6\")   Wt 67.7 kg (149 lb 4.8 oz)   SpO2 " 98%   BMI 24.10 kg/m      PHYSICAL EXAMINATION:  HEENT exam shows no scleral icterus or temporal muscle wasting.  Her chest is clear.  Abdominal exam shows no obvious ascites.  No masses or tenderness to palpation are present.  Her liver is 10 cm in span without left lobe enlargement.  No spleen tip is palpable.  Extremity exam  shows no edema.  Skin exam shows no stigmata of chronic liver disease.  Neurologic exam shows no asterixis.          Data:     BMP  Recent Labs   Lab Test 03/19/21  1009 02/25/21 1959 01/19/21  0856 12/08/20  0647    131* 143 135   POTASSIUM 3.7 4.1 3.8 3.9   CHLORIDE 97 97 105 102   MAURI 8.6 8.8 8.2* 9.1   CO2 37* 27 34* 25   BUN 9 15 12 9   CR 1.03 1.07* 1.15* 0.99   * 565* 145* 157*     CBC  Recent Labs   Lab Test 03/19/21  1009 02/25/21 2209 01/19/21  0856 12/08/20  0647   WBC 4.6 4.9 3.7* 3.8*   RBC 3.45* 3.06* 3.07* 3.13*   HGB 11.4* 10.1* 10.4* 10.7*   HCT 35.2 29.6* 31.8* 32.5*   * 97 104* 104*   MCH 33.0 33.0 33.9* 34.2*   MCHC 32.4 34.1 32.7 32.9   RDW 13.8 14.1 14.0 12.8   * 77* 104* 110*     Liver Function Studies   Recent Labs   Lab Test 03/19/21  1009   PROTTOTAL 7.5   ALBUMIN 2.8*   BILITOTAL 0.7   ALKPHOS 107   AST 45   ALT 31      INR   INR   Date Value Ref Range Status   03/19/2021 1.09 0.86 - 1.14 Final        CT Abdomen Pelvis 2/2021:  IMPRESSION:   1.  Evidence for constipation without bowel obstruction.  2.  Evidence for hepatic cirrhosis as on prior  3.  Remainder stable. See guidelines below for pulmonary nodule recommendations.  * Perisplenic varices.    EGD 4/2021:  Impression:          - Normal examined jejunum.                       - Faustino-en-Y gastrojejunostomy with                        gastrojejunal anastomosis                        characterized by healthy appearing                        mucosa.                       - Gastric-enteric fistula.                       - Grade I esophageal varices.                       - No  specimens collected.  Recommendation:      - Low sodium diet.    Colonoscopy 1/2021:  Impression:          - The examined portion of the ileum                        was normal.                       - Mild diffuse mucosal congestion                        with scattered mild hyperemic areas.                        Suggestive of portal colopathy. No                        apparent colitis. Biopsies obtained                        to look for microscopic colitis.                       - One 10 mm polyp in the sigmoid                        colon, removed with a hot snare.                        Resected and retrieved.                       - The examination was otherwise                        normal.    IMPRESSION AND PLAN:  My impression is that Ms. Puckett has cirrhosis caused by nonalcoholic fatty liver disease.  Her MELD score has only been 8-9, thus she is not a high priority for transplantation.  She does have a number of people that are undergoing a live donor evaluation of which at least 2 are still active as a potential candidate.      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  I will get her set up to see Interventional Radiology.  In the meantime, we will see how quickly the fluid recurs.  Certainly if she starts to get to the point where she is requiring a thoracentesis every 1-2 weeks, I do think we should go forward with that.  I did discuss with her at length what the procedure involves and the potential complications of encephalopathy.        She has been vaccinated against COVID-19 and is otherwise up to date with regard to screening and other vaccinations.        My plan will be to see her back in the clinic in 3 months.      Thank you very much for allowing me to participate in the care of this patient.  If you have any questions regarding my recommendations, please do not hesitate to contact me.         Raphael Cook MD      Professor of  Medicine  [employer]      Executive Medical Director, Solid Organ Transplant Program  [facility] .

## 2021-04-07 NOTE — NURSING NOTE
"Chief Complaint   Patient presents with     RECHECK     CUETO Cirrhosis       Vital signs:  Temp: 98.3  F (36.8  C) Temp src: Oral BP: 104/71 Pulse: 81   Resp: 18 SpO2: 98 %     Height: 167.6 cm (5' 6\") Weight: 67.7 kg (149 lb 4.8 oz)  Estimated body mass index is 24.1 kg/m  as calculated from the following:    Height as of this encounter: 1.676 m (5' 6\").    Weight as of this encounter: 67.7 kg (149 lb 4.8 oz).          Tanna Blandon, [facility]  4/7/2021 12:01 PM      "

## 2021-04-08 ENCOUNTER — TELEPHONE (OUTPATIENT)
Dept: INTERVENTIONAL RADIOLOGY/VASCULAR | Facility: CLINIC | Age: 51
End: 2021-04-08

## 2021-04-08 NOTE — TELEPHONE ENCOUNTER
Spoke with patient regarding appointment needing scheduled with Dr. Storey.    Patient is scheduled for a video visit 4/12 at 10:40 with .    Patient is aware of date, time, and location of appointment and had no further questions.

## 2021-04-09 NOTE — TELEPHONE ENCOUNTER
DIAGNOSIS: TIPS referred by Dr. Cook   DATE RECEIVED: 4.12.21   NOTES STATUS DETAILS   OFFICE NOTE from referring provider Internal 4.7.21  Dr. Raphael Cook  [facility] Hepatology   OFFICE NOTE from other specialist CE 3.29-4.3.21  Dr. Inna Burk   [facility]   OPERATIVE REPORT na    MEDICATION LIST Internal    PERTINENT LABS CE    CTA (CT ANGIOGRAPHY) na    CT Internal 2.26.21, 11.22.20, 10.26.20  CT Abd/Pelvis   MRI na    ULTRASOUND Internal 3.19.21, 1.19.21  [facility]

## 2021-04-12 ENCOUNTER — VIRTUAL VISIT (OUTPATIENT)
Dept: VASCULAR SURGERY | Facility: CLINIC | Age: 51
End: 2021-04-12
Payer: COMMERCIAL

## 2021-04-12 ENCOUNTER — PRE VISIT (OUTPATIENT)
Dept: VASCULAR SURGERY | Facility: CLINIC | Age: 51
End: 2021-04-12

## 2021-04-12 DIAGNOSIS — K75.81 NASH (NONALCOHOLIC STEATOHEPATITIS): Primary | ICD-10-CM

## 2021-04-12 DIAGNOSIS — R18.8 OTHER ASCITES: ICD-10-CM

## 2021-04-12 PROCEDURE — 99205 OFFICE O/P NEW HI 60 MIN: CPT | Mod: 95 | Performed by: RADIOLOGY

## 2021-04-12 ASSESSMENT — PAIN SCALES - GENERAL: PAINLEVEL: NO PAIN (0)

## 2021-04-12 NOTE — LETTER
4/12/2021       RE: Susan Puckett  [address] [facility] [address]     Dear Colleague,    Thank you for referring your patient, Susan Puckett, to the [facility] VASCULAR [facility] [place] [facility]. Please see a copy of my visit note below.    Susan is a 50 year old who is being evaluated via a billable video visit.      How would you like to obtain your AVS? MyChart  If the video visit is dropped, the invitation should be resent by: Other e-mail: my chart connect  Will anyone else be joining your video visit? No      Video-Visit Details    Type of service:  Video Visit    Video Start Time: 1040    Video End Time:1120    Originating Location (pt. Location): Home    Distant Location (provider location):  [facility] VASCULAR [facility]     Platform used for Video Visit: Doximity     MELD-Na score: 7 at 3/19/2021 10:09 AM  MELD score: 7 at 3/19/2021 10:09 AM  Calculated from:  Serum Creatinine: 1.03 mg/dL at 3/19/2021 10:09 AM  Serum Sodium: 135 mmol/L at 3/19/2021 10:09 AM  Total Bilirubin: 0.7 mg/dL (Rounded to 1 mg/dL) at 3/19/2021 10:09 AM  INR(ratio): 1.09 at 3/19/2021 10:09 AM  Age: 50 years 8 months           Interventional Radiology Clinic Visit    Date of this visit: 5/18/2021    Susan Puckett  is referred by Dr. Cook for treatment recommendations. 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  Her past medical history is most significant for gastric bypass surgery for obesity, diabetes and Du cirrhosis.  She shares with me that she was recently admitted to [facility] for worsening pleural effusions.  She does not routinely undergo thoracentesis, however she was found to have a  large right pleural effusion and a small left effusion.  There was about a liter of fluid drained from her right pleural space.  Currently she feels well with no significant abdominal pain or distention or shortness of breath.  She has not had required any additional thoracentesis.  She sees Dr. Cook from hepatology for cirrhosis and portal hypertension complications.  She is not receiving routine thoracentesis at this time.  She has not had any recurrence of pleural effusion.  She is taking Lasix 40 mg twice daily.  She also takes 200 mg of Aldactone daily.  She does not have any significant history of ascites that require paracentesis.  However there is trace ascites on imaging.  She has not had any history of hepatic encephalopathy (taking lactulose and rifaximin) or any episodes of gastrointestinal bleeding.  No lower extremity edema.  She has a good appetite.  She has not had any significant weight gain or loss.    Review of Systems:    General: Negative for recent fever.  Skin: Negative for jaundice.  Eyes: Negative for jaundice.  Respiratory: Negative for shortness of breath or cough.  Cardiovascular: Negative for chest pain.  Gastrointestinal: Negative for abdominal pain or swelling, nausea, vomiting, or diarrhea.  Musculoskeletal: Negative for ankle swelling.    Past Medical/Surgical History:    Past Medical History:   Diagnosis Date     Depressive disorder      Diabetes (H)     Type 2 DM/No Insulin      History of blood transfusion     10/2019     Liver cirrhosis secondary to CUETO (H) 05/28/2020     Thyroid disease      Past Surgical History:   Procedure Laterality Date     APPENDECTOMY      1989     COLONOSCOPY      1/2020 at [facility]      ENT SURGERY       GI SURGERY      EGD August 2020 at [facility]      GYN SURGERY      2010     RNY gastric bypass  01/2006    retoocolic, retrogastric, [facility]     VASCULAR SURGERY         Current Medications:    Current Outpatient Medications   Medication Sig  Dispense Refill     amylase-lipase-protease (CREON 6) [id number] units CPEP Take 3 capsules by mouth 3 times daily (with meals)       atorvastatin (LIPITOR) 20 MG tablet Take 20 mg by mouth daily        calcium carbonate (TUMS) 500 MG chewable tablet Take 1 tablet by mouth daily as needed for heartburn        calcium citrate-vitamin D (CITRACAL W/D) 250-100 MG-UNIT tablet Take 2 tablets by mouth 2 times daily (with meals) (Patient taking differently: Take 1 tablet by mouth 2 times daily (with meals) ) 336 tablet 3     cholecalciferol (VITAMIN D-1000 MAX ST) 25 MCG (1000 UT) TABS Take 4,000 Units by mouth       cyanocobalamin (VITAMIN B-12) 1000 MCG tablet Take 1,000 mcg by mouth daily        Ferrous Sulfate (IRON) 325 (65 FE) MG tablet Take 1 tablet by mouth daily       folic acid (FOLVITE) 1 MG tablet Take 1 mg by mouth daily        furosemide (LASIX) 40 MG tablet Take 1 tablet (40 mg) by mouth 2 times daily 60 tablet 0     hydrOXYzine (ATARAX) 25 MG tablet Take 25 mg by mouth 3 times daily as needed for anxiety       insulin glargine (LANTUS PEN) 100 UNIT/ML pen Inject 12 Units Subcutaneous At Bedtime       lactulose 20 GM/30ML SOLN Take 30 mLs by mouth 2 times daily (Patient taking differently: Take 30 mLs by mouth 3 times daily ) 1 Bottle 0     Multiple Vitamin (MULTIVITAMIN PO) Take 2 tablets by mouth daily       multivitamin (DEKAS ESSENTIAL) capsule Take 1 capsule by mouth daily        omeprazole (PRILOSEC) 20 MG DR capsule Take 1 capsule (20 mg) by mouth daily 30 capsule 0     ondansetron (ZOFRAN-ODT) 4 MG ODT tab Place 4 mg under the tongue every 6 hours as needed for nausea        polyethylene glycol (MIRALAX) 17 g packet Take 1 packet by mouth daily as needed for constipation       rifampin (RIFADIN) 300 MG capsule Take 1 capsule (300 mg) by mouth daily 30 capsule 5     rifaximin (XIFAXAN) 550 MG TABS tablet Take 1 tablet (550 mg) by mouth 2 times daily 60 tablet 11     simethicone (MYLICON) 80 MG  chewable tablet Take 80 mg by mouth every 6 hours as needed for flatulence or cramping       sitagliptin (JANUVIA) 100 MG tablet Take 1 tablet (100 mg) by mouth daily       spironolactone (ALDACTONE) 100 MG tablet Take 200 mg by mouth daily        SUMAtriptan (IMITREX) 50 MG tablet Take 50 mg by mouth at onset of headache for migraine        traZODone (DESYREL) 100 MG tablet Take  mg by mouth nightly as needed for sleep       valACYclovir (VALTREX) 1000 mg tablet Take 1,000 mg by mouth daily as needed (at onset of cold sore)        venlafaxine (EFFEXOR) 75 MG tablet Take 1 tablet (75 mg) by mouth At Bedtime 30 tablet 0     vitamin A 3 MG (59865 UNITS) capsule Take 10,000 Units by mouth daily        vitamin C (ASCORBIC ACID) 100 MG tablet Take 1,000 mg by mouth daily        vitamin D3 (CHOLECALCIFEROL) 50 mcg (2000 units) tablet Take 1 tablet (50 mcg) by mouth daily 90 tablet 3     vitamin E (TOCOPHEROL) 400 units (180 mg) capsule Take 400 Units by mouth daily        Vitamin K, Phytonadione, 100 MCG TABS Take 100 mcg by mouth daily        levothyroxine (SYNTHROID/LEVOTHROID) 175 MCG tablet Take 1 tablet (175 mcg) by mouth daily 30 tablet 0       Allergies:    Prednisone and Nsaids    Family History:    No family history on file.    Social History:    No significant history of alcohol or tobacco use.  Good support structure at home.    Social History     Socioeconomic History     Marital status:      Spouse name: None     Number of children: None     Years of education: None     Highest education level: Bachelor's degree (e.g., BA, AB, BS)   Occupational History     None   Social Needs     Financial resource strain: None     Food insecurity     Worry: Never true     Inability: Never true     Transportation needs     Medical: No     Non-medical: No   Tobacco Use     Smoking status: Never Smoker     Smokeless tobacco: Never Used   Substance and Sexual Activity     Alcohol use: Not Currently     Frequency:  Never     Binge frequency: Never     Comment: Last drink was in 2017     Drug use: Never     Sexual activity: None   Lifestyle     Physical activity     Days per week: 1 day     Minutes per session: 10 min     Stress: To some extent   Relationships     Social connections     Talks on phone: More than three times a week     Gets together: Once a week     Attends [employer] service: More than 4 times per year     Active member of club or organization: No     Attends meetings of clubs or organizations: Never     Relationship status:      Intimate partner violence     Fear of current or ex partner: None     Emotionally abused: None     Physically abused: None     Forced sexual activity: None   Other Topics Concern     Parent/sibling w/ CABG, MI or angioplasty before 65F 55M? Not Asked   Social History Narrative     None       Physical Exam:    There were no vitals taken for this visit.     GENERAL APPEARANCE: healthy, alert and no distress  PSYCHIATRIC: mentation appears normal and affect normal.  NEURO: normal speech and movements  EYES: No jaundice.  SKIN: No jaundice.   RESP: Speaks in full sentences no overt shortness of breath  ABDOMEN: No abdominal distention.   MUSCULOSKELETAL: No edema in the lower extremities.    Laboratory Studies:    I have personally reviewed her labs and calculated the meld score.  Lab Results   Component Value Date    HGB 11.4 03/19/2021     Lab Results   Component Value Date     03/19/2021     Lab Results   Component Value Date    WBC 4.6 03/19/2021       Lab Results   Component Value Date    INR 1.09 03/19/2021       Lab Results   Component Value Date    PROTTOTAL 7.5 03/19/2021      Lab Results   Component Value Date    ALBUMIN 2.8 03/19/2021     Lab Results   Component Value Date    BILITOTAL 0.7 03/19/2021     No results found for: BILICONJ   Lab Results   Component Value Date    ALKPHOS 107 03/19/2021     Lab Results   Component Value Date    AST 45 03/19/2021     Lab  Results   Component Value Date    ALT 31 03/19/2021       Lab Results   Component Value Date    CR 1.03 03/19/2021     Lab Results   Component Value Date    BUN 9 03/19/2021       Alpha Fetoprotein   Date Value Ref Range Status   10/20/2020 <1.5 0 - 8 ug/L Final     Comment:     Reference ranges apply to non-pregnant females only.  Assay Method:  Chemiluminescence using Siemens Centaur XP         Imaging:     I personally interpreted the imaging.    Contrast enhanced CT of the abdomen dated 2/26/2021: Cirrhotic appearance of the liver and sequela of portal hypertension with minimal ascites.  No large pleural effusion.  Patent portal and hepatic veins.    Ultrasound dated 3/19/2021: Slightly increased ascites.    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  Her portal and hepatic veins are patent and there is no liver mass. The patient's MELD score is 7.  She is therefore a suitable candidate for a TIPS procedure.   I discussed with the patient what a TIPS procedure is and showed them pictures of the stent and the way we join a hepatic and portal vein. I explained why the procedure is performed and how it decompresses the portal venous system. We discussed the risks of the procedure, which include but are not limited to infection, damage to nearby vessels or bile ducts, bleeding, failure of the procedure, stent maldeployment, liver failure, right heart failure, and encephalopathy. We discussed the alternative of not doing the procedure, which would be medical management of hepatic hydrothorax.    I discussed that the procedure will be performed under general anaesthesia and that most patients go home the same day. I explained they will be followed with regular TIPS ultrasounds due to the risk of narrowing or thrombosis within the stent over time. We discussed that while some patients do not respond to the procedure, most  patients experience relief or decrease in their hepatic hydrothorax over 3 to 6 months after the procedure. All of the patient's questions were answered and s/he agreed to proceed if she has recurrent symptoms of pleural effusion.     PLAN:    I do agree with the recommendation from Dr. Johnson that she could be a good candidate for TIPS procedure.  However she has not had any additional episodes of pleural effusion to require additional thoracentesis.  I would like to follow-up with her in 1 month on the phone to assess any additional requirement for thoracentesis or symptoms of pleural effusion.  If she remains asymptomatic I will see her back again in 2-3 months otherwise we will proceed with placement of the TIPS.      Thank you for allowing me to participate in Ms. Puckett's care today.    I, Dr Eddie Storey, was present for the entirety of this telemedicine video visit. I have documented the findings as well as the assessment and plan.    Preparation time including review of outside records, discussion with other physicians and interpretation of the imagin minutes  Video visit time: 40 minutes  Documentation time: 10 minutes    Eddie Storey MD    Vascular and Interventional Radiolgy  [facility]    CC  Patient Care Team:  Harleen Billy PA-C as PCP - General  Rivera Dowling MD as MD (Gastroenterology)  Caty Johnson MD as MD (Gastroenterology)  Ozzie Augustin MD as Assigned Behavioral Health Provider  Eli Tobar, RN as Specialty Care Coordinator  Daja Suarez, RN as Registered Nurse (Transplant)  Caty Johnson MD as Assigned Surgical Provider  CATY JOHNSON

## 2021-04-12 NOTE — PATIENT INSTRUCTIONS
Preventive Care:    Breast Cancer Screening: During our visit today, we discussed that it is recommended you receive breast cancer screening. Please call or make an appointment with your primary care provider to discuss this with them. You may also call the  [facility] scheduling line ([phone]) to set up a mammography appointment at the Breast Center within the [facility].    Colorectal Cancer Screening: During our visit today, we discussed that it is recommended you receive colorectal cancer screening. Please call or make an appointment with your primary care provider to discuss this. You may also call the [facility] scheduling line ([phone]) to set up a colonoscopy appointment.    Susan you have had your virtual consult today with Dr Raleigh STEVE to discuss TIPS procedure.    Plan    Return in 3 months for another discussion with updated MELD labs.  We will contact you to schedule your appointments closer to the date.    Thank you ,    MARY ANN Philippe, RN, BSN  Interventional Radiology Nurse Coordinator   Phone:  [phone]

## 2021-04-12 NOTE — NURSING NOTE
Vascular Rooming Note     Susan Puckett's goals for this visit include:   Chief Complaint   Patient presents with     Consult     Susan, is participating in a virtual visit today for a consult regarding TIPS procedure, referred by , feeling really tired and fatigued, as reported by patient.     Bhavana Pérez LPN

## 2021-04-12 NOTE — PROGRESS NOTES
Susan is a 50 year old who is being evaluated via a billable video visit.      How would you like to obtain your AVS? MyChart  If the video visit is dropped, the invitation should be resent by: Other e-mail: my chart connect  Will anyone else be joining your video visit? No      Video Start Time: 1040    Video-Visit Details    Type of service:  Video Visit    Video End Time:1120    Originating Location (pt. Location): Home    Distant Location (provider location):  [facility] [place]     Platform used for Video Visit: Doximity     MELD-Na score: 7 at 3/19/2021 10:09 AM  MELD score: 7 at 3/19/2021 10:09 AM  Calculated from:  Serum Creatinine: 1.03 mg/dL at 3/19/2021 10:09 AM  Serum Sodium: 135 mmol/L at 3/19/2021 10:09 AM  Total Bilirubin: 0.7 mg/dL (Rounded to 1 mg/dL) at 3/19/2021 10:09 AM  INR(ratio): 1.09 at 3/19/2021 10:09 AM  Age: 50 years 8 months           Interventional Radiology Clinic Visit    Date of this visit: 5/18/2021    Susan Puckett  is referred by Dr. Cook for treatment recommendations. 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  Her past medical history is most significant for gastric bypass surgery for obesity, diabetes and Du cirrhosis.  She shares with me that she was recently admitted to [facility] for worsening pleural effusions.  She does not routinely undergo thoracentesis, however she was found to have a large right pleural effusion and a small left effusion.  There was about a liter of fluid drained from her right pleural space.  Currently she feels well with no significant abdominal pain or distention or shortness of breath.  She has not had required any additional thoracentesis.  She sees Dr. Cook from  hepatology for cirrhosis and portal hypertension complications.  She is not receiving routine thoracentesis at this time.  She has not had any recurrence of pleural effusion.  She is taking Lasix 40 mg twice daily.  She also takes 200 mg of Aldactone daily.  She does not have any significant history of ascites that require paracentesis.  However there is trace ascites on imaging.  She has not had any history of hepatic encephalopathy (taking lactulose and rifaximin) or any episodes of gastrointestinal bleeding.  No lower extremity edema.  She has a good appetite.  She has not had any significant weight gain or loss.    Review of Systems:    General: Negative for recent fever.  Skin: Negative for jaundice.  Eyes: Negative for jaundice.  Respiratory: Negative for shortness of breath or cough.  Cardiovascular: Negative for chest pain.  Gastrointestinal: Negative for abdominal pain or swelling, nausea, vomiting, or diarrhea.  Musculoskeletal: Negative for ankle swelling.    Past Medical/Surgical History:    Past Medical History:   Diagnosis Date     Depressive disorder      Diabetes (H)     Type 2 DM/No Insulin      History of blood transfusion     10/2019     Liver cirrhosis secondary to CUETO (H) 05/28/2020     Thyroid disease      Past Surgical History:   Procedure Laterality Date     APPENDECTOMY      1989     COLONOSCOPY      1/2020 at [facility]      ENT SURGERY       GI SURGERY      EGD August 2020 at [facility]      GYN SURGERY      2010     RNY gastric bypass  01/2006    retoocolic, retrogastric, [facility]     VASCULAR SURGERY         Current Medications:    Current Outpatient Medications   Medication Sig Dispense Refill     amylase-lipase-protease (CREON 6) [id number] units CPEP Take 3 capsules by mouth 3 times daily (with meals)       atorvastatin (LIPITOR) 20 MG tablet Take 20 mg by mouth daily        calcium carbonate (TUMS) 500 MG chewable tablet Take 1 tablet by mouth daily as needed for  heartburn        calcium citrate-vitamin D (CITRACAL W/D) 250-100 MG-UNIT tablet Take 2 tablets by mouth 2 times daily (with meals) (Patient taking differently: Take 1 tablet by mouth 2 times daily (with meals) ) 336 tablet 3     cholecalciferol (VITAMIN D-1000 MAX ST) 25 MCG (1000 UT) TABS Take 4,000 Units by mouth       cyanocobalamin (VITAMIN B-12) 1000 MCG tablet Take 1,000 mcg by mouth daily        Ferrous Sulfate (IRON) 325 (65 FE) MG tablet Take 1 tablet by mouth daily       folic acid (FOLVITE) 1 MG tablet Take 1 mg by mouth daily        furosemide (LASIX) 40 MG tablet Take 1 tablet (40 mg) by mouth 2 times daily 60 tablet 0     hydrOXYzine (ATARAX) 25 MG tablet Take 25 mg by mouth 3 times daily as needed for anxiety       insulin glargine (LANTUS PEN) 100 UNIT/ML pen Inject 12 Units Subcutaneous At Bedtime       lactulose 20 GM/30ML SOLN Take 30 mLs by mouth 2 times daily (Patient taking differently: Take 30 mLs by mouth 3 times daily ) 1 Bottle 0     Multiple Vitamin (MULTIVITAMIN PO) Take 2 tablets by mouth daily       multivitamin (DEKAS ESSENTIAL) capsule Take 1 capsule by mouth daily        omeprazole (PRILOSEC) 20 MG DR capsule Take 1 capsule (20 mg) by mouth daily 30 capsule 0     ondansetron (ZOFRAN-ODT) 4 MG ODT tab Place 4 mg under the tongue every 6 hours as needed for nausea        polyethylene glycol (MIRALAX) 17 g packet Take 1 packet by mouth daily as needed for constipation       rifampin (RIFADIN) 300 MG capsule Take 1 capsule (300 mg) by mouth daily 30 capsule 5     rifaximin (XIFAXAN) 550 MG TABS tablet Take 1 tablet (550 mg) by mouth 2 times daily 60 tablet 11     simethicone (MYLICON) 80 MG chewable tablet Take 80 mg by mouth every 6 hours as needed for flatulence or cramping       sitagliptin (JANUVIA) 100 MG tablet Take 1 tablet (100 mg) by mouth daily       spironolactone (ALDACTONE) 100 MG tablet Take 200 mg by mouth daily        SUMAtriptan (IMITREX) 50 MG tablet Take 50 mg by  mouth at onset of headache for migraine        traZODone (DESYREL) 100 MG tablet Take  mg by mouth nightly as needed for sleep       valACYclovir (VALTREX) 1000 mg tablet Take 1,000 mg by mouth daily as needed (at onset of cold sore)        venlafaxine (EFFEXOR) 75 MG tablet Take 1 tablet (75 mg) by mouth At Bedtime 30 tablet 0     vitamin A 3 MG (17298 UNITS) capsule Take 10,000 Units by mouth daily        vitamin C (ASCORBIC ACID) 100 MG tablet Take 1,000 mg by mouth daily        vitamin D3 (CHOLECALCIFEROL) 50 mcg (2000 units) tablet Take 1 tablet (50 mcg) by mouth daily 90 tablet 3     vitamin E (TOCOPHEROL) 400 units (180 mg) capsule Take 400 Units by mouth daily        Vitamin K, Phytonadione, 100 MCG TABS Take 100 mcg by mouth daily        levothyroxine (SYNTHROID/LEVOTHROID) 175 MCG tablet Take 1 tablet (175 mcg) by mouth daily 30 tablet 0       Allergies:    Prednisone and Nsaids    Family History:    No family history on file.    Social History:    No significant history of alcohol or tobacco use.  Good support structure at home.    Social History     Socioeconomic History     Marital status:      Spouse name: None     Number of children: None     Years of education: None     Highest education level: Bachelor's degree (e.g., BA, AB, BS)   Occupational History     None   Social Needs     Financial resource strain: None     Food insecurity     Worry: Never true     Inability: Never true     Transportation needs     Medical: No     Non-medical: No   Tobacco Use     Smoking status: Never Smoker     Smokeless tobacco: Never Used   Substance and Sexual Activity     Alcohol use: Not Currently     Frequency: Never     Binge frequency: Never     Comment: Last drink was in 2017     Drug use: Never     Sexual activity: None   Lifestyle     Physical activity     Days per week: 1 day     Minutes per session: 10 min     Stress: To some extent   Relationships     Social connections     Talks on phone: More  than three times a week     Gets together: Once a week     Attends Confucianism service: More than 4 times per year     Active member of club or organization: No     Attends meetings of clubs or organizations: Never     Relationship status:      Intimate partner violence     Fear of current or ex partner: None     Emotionally abused: None     Physically abused: None     Forced sexual activity: None   Other Topics Concern     Parent/sibling w/ CABG, MI or angioplasty before 65F 55M? Not Asked   Social History Narrative     None       Physical Exam:    There were no vitals taken for this visit.     GENERAL APPEARANCE: healthy, alert and no distress  PSYCHIATRIC: mentation appears normal and affect normal.  NEURO: normal speech and movements  EYES: No jaundice.  SKIN: No jaundice.   RESP: Speaks in full sentences no overt shortness of breath  ABDOMEN: No abdominal distention.   MUSCULOSKELETAL: No edema in the lower extremities.    Laboratory Studies:    I have personally reviewed her labs and calculated the meld score.  Lab Results   Component Value Date    HGB 11.4 03/19/2021     Lab Results   Component Value Date     03/19/2021     Lab Results   Component Value Date    WBC 4.6 03/19/2021       Lab Results   Component Value Date    INR 1.09 03/19/2021       Lab Results   Component Value Date    PROTTOTAL 7.5 03/19/2021      Lab Results   Component Value Date    ALBUMIN 2.8 03/19/2021     Lab Results   Component Value Date    BILITOTAL 0.7 03/19/2021     No results found for: BILICONJ   Lab Results   Component Value Date    ALKPHOS 107 03/19/2021     Lab Results   Component Value Date    AST 45 03/19/2021     Lab Results   Component Value Date    ALT 31 03/19/2021       Lab Results   Component Value Date    CR 1.03 03/19/2021     Lab Results   Component Value Date    BUN 9 03/19/2021       Alpha Fetoprotein   Date Value Ref Range Status   10/20/2020 <1.5 0 - 8 ug/L Final     Comment:     Reference ranges  apply to non-pregnant females only.  Assay Method:  Chemiluminescence using Siemens Centaur XP         Imaging:     I personally interpreted the imaging.    Contrast enhanced CT of the abdomen dated 2/26/2021: Cirrhotic appearance of the liver and sequela of portal hypertension with minimal ascites.  No large pleural effusion.  Patent portal and hepatic veins.    Ultrasound dated 3/19/2021: Slightly increased ascites.    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  Her portal and hepatic veins are patent and there is no liver mass. The patient's MELD score is 7.  She is therefore a suitable candidate for a TIPS procedure.   I discussed with the patient what a TIPS procedure is and showed them pictures of the stent and the way we join a hepatic and portal vein. I explained why the procedure is performed and how it decompresses the portal venous system. We discussed the risks of the procedure, which include but are not limited to infection, damage to nearby vessels or bile ducts, bleeding, failure of the procedure, stent maldeployment, liver failure, right heart failure, and encephalopathy. We discussed the alternative of not doing the procedure, which would be medical management of hepatic hydrothorax.    I discussed that the procedure will be performed under general anaesthesia and that most patients go home the same day. I explained they will be followed with regular TIPS ultrasounds due to the risk of narrowing or thrombosis within the stent over time. We discussed that while some patients do not respond to the procedure, most patients experience relief or decrease in their hepatic hydrothorax over 3 to 6 months after the procedure. All of the patient's questions were answered and s/he agreed to proceed if she has recurrent symptoms of pleural effusion.     PLAN:    I do agree with the recommendation from Dr. Cook  that she could be a good candidate for TIPS procedure.  However she has not had any additional episodes of pleural effusion to require additional thoracentesis.  I would like to follow-up with her in 1 month on the phone to assess any additional requirement for thoracentesis or symptoms of pleural effusion.  If she remains asymptomatic I will see her back again in 2-3 months otherwise we will proceed with placement of the TIPS.      Thank you for allowing me to participate in Ms. Puckett's care today.    I, Dr Eddie Storey, was present for the entirety of this telemedicine video visit. I have documented the findings as well as the assessment and plan.    Preparation time including review of outside records, discussion with other physicians and interpretation of the imagin minutes  Video visit time: 40 minutes  Documentation time: 10 minutes    Eddie Storey MD    Vascular and Interventional Radiolgy  [facility]  Patient Care Team:  Harleen Billy PA-C as PCP - General  Rivera Dowling MD as MD (Gastroenterology)  Caty Johnson MD as MD (Gastroenterology)  Ozzie Augustin MD as Assigned Behavioral Health Provider  Eli Tobar RN as Specialty Care Coordinator  Daja Suarez RN as Registered Nurse (Transplant)  Caty Johnson MD as Assigned Surgical Provider  CATY JOHNSON

## 2021-04-16 ENCOUNTER — TELEPHONE (OUTPATIENT)
Dept: TRANSPLANT | Facility: CLINIC | Age: 51
End: 2021-04-16

## 2021-04-16 NOTE — TELEPHONE ENCOUNTER
Susan called with following questions:    - Wanted to verify Spironolactone dose- HomeCare nurse thought was 100 mg every day vs 200 mg q day  (supplied as 100 mg tablets)    - Traveling to [place]- wanted to know if she could have xray to check for fluid in lungs before traveling.  Denies SOB, cough, or any other sx associated with fludi overload.  Reports feeling well since discharge from outside hospital.  Told her we typically don't do prophylactic xrays just in case of fluid, but encouraged her to get evaluated if any s/s similar to previous pleural effusion (SOB, cough, etc.)  Also asked if it is advised if she should avoid parasailing when she travels to [place] at the end of this month.  Told her not recommended for most liver failure patients related to concerns of judgment with any associated HE increased risk of bleeding, etc. And she would need to consider risk vs benefit.    She reports having received both does of Pfizer covid vaccine, 2nd dose 4/5/21.

## 2021-04-23 ENCOUNTER — TELEPHONE (OUTPATIENT)
Dept: TRANSPLANT | Facility: CLINIC | Age: 51
End: 2021-04-23

## 2021-04-23 NOTE — TELEPHONE ENCOUNTER
Susan called to report feeling bloated.    She does not feel like it is ascites she feels she is constipated.    Reinforced teaching previously done on several occasions about titration up of  lactulose.  Reports only having 1-2 BMs a day, told her goal at least 3-5, but not to point of more frequent than that/increased diarrhea.    Reports gas pain- Encouraged her to use Gas-x as previously recommended by Dr. Cook. As well as heat and activity    She reports she has been very active, out running errands all day.    Encouraged good po intake, small frequent meals, low Na+/high protein    Patient is planning on leaving Tuesday for trip to [place], feeling anxious about leaving.    Denies new abdominal pain other than noted above, denies blood in stool, says abdomen soft to touch/WNL.  Denies SOB  (prior pleural effusion several weeks ago.)  Reports she was seen by PCP a couple days ago and they said her lungs were clear and everything looked good.

## 2021-05-08 ENCOUNTER — HEALTH MAINTENANCE LETTER (OUTPATIENT)
Age: 51
End: 2021-05-08

## 2021-05-16 NOTE — PLAN OF CARE
AVSS.  Alert and oriented x4.  Pt c/o fatigue, generalized weakness.  HE=0.  BM x1 overnight, void spont.  Up with assist 1 to BR.  Denies nausea, tolerating diet.  Complained of HA - Imitrex x1.  PIV SL.  Has diabetic sensor on L arm.  Cont with POC.    * Pt would like to know when MDs will round this AM.  Family would like to do a video call - want to know the plan of care.   No

## 2021-05-18 ENCOUNTER — ANCILLARY PROCEDURE (OUTPATIENT)
Dept: ULTRASOUND IMAGING | Facility: CLINIC | Age: 51
End: 2021-05-18
Attending: INTERNAL MEDICINE
Payer: COMMERCIAL

## 2021-05-18 ENCOUNTER — OFFICE VISIT (OUTPATIENT)
Dept: GASTROENTEROLOGY | Facility: CLINIC | Age: 51
End: 2021-05-18
Attending: INTERNAL MEDICINE
Payer: COMMERCIAL

## 2021-05-18 ENCOUNTER — DOCUMENTATION ONLY (OUTPATIENT)
Dept: LAB | Facility: CLINIC | Age: 51
End: 2021-05-18

## 2021-05-18 VITALS
HEART RATE: 80 BPM | DIASTOLIC BLOOD PRESSURE: 72 MMHG | OXYGEN SATURATION: 100 % | SYSTOLIC BLOOD PRESSURE: 104 MMHG | WEIGHT: 157.1 LBS | BODY MASS INDEX: 25.36 KG/M2

## 2021-05-18 DIAGNOSIS — K74.60 CIRRHOSIS OF LIVER WITH ASCITES, UNSPECIFIED HEPATIC CIRRHOSIS TYPE (H): ICD-10-CM

## 2021-05-18 DIAGNOSIS — R18.8 CIRRHOSIS OF LIVER WITH ASCITES, UNSPECIFIED HEPATIC CIRRHOSIS TYPE (H): ICD-10-CM

## 2021-05-18 DIAGNOSIS — K74.60 CIRRHOSIS OF LIVER WITH ASCITES, UNSPECIFIED HEPATIC CIRRHOSIS TYPE (H): Primary | ICD-10-CM

## 2021-05-18 DIAGNOSIS — R18.8 CIRRHOSIS OF LIVER WITH ASCITES, UNSPECIFIED HEPATIC CIRRHOSIS TYPE (H): Primary | ICD-10-CM

## 2021-05-18 LAB
AFP SERPL-MCNC: 3.5 UG/L (ref 0–8)
ALBUMIN SERPL-MCNC: 2.8 G/DL (ref 3.4–5)
ALP SERPL-CCNC: 119 U/L (ref 40–150)
ALT SERPL W P-5'-P-CCNC: 33 U/L (ref 0–50)
ANION GAP SERPL CALCULATED.3IONS-SCNC: 2 MMOL/L (ref 3–14)
AST SERPL W P-5'-P-CCNC: 40 U/L (ref 0–45)
BILIRUB DIRECT SERPL-MCNC: 0.2 MG/DL (ref 0–0.2)
BILIRUB SERPL-MCNC: 0.5 MG/DL (ref 0.2–1.3)
BUN SERPL-MCNC: 11 MG/DL (ref 7–30)
CALCIUM SERPL-MCNC: 8.7 MG/DL (ref 8.5–10.1)
CHLORIDE SERPL-SCNC: 105 MMOL/L (ref 94–109)
CO2 SERPL-SCNC: 32 MMOL/L (ref 20–32)
CREAT SERPL-MCNC: 0.94 MG/DL (ref 0.52–1.04)
ERYTHROCYTE [DISTWIDTH] IN BLOOD BY AUTOMATED COUNT: 13.4 % (ref 10–15)
GFR SERPL CREATININE-BSD FRML MDRD: 71 ML/MIN/{1.73_M2}
GLUCOSE SERPL-MCNC: 61 MG/DL (ref 70–99)
HCT VFR BLD AUTO: 35 % (ref 35–47)
HGB BLD-MCNC: 11.3 G/DL (ref 11.7–15.7)
INR PPP: 1.07 (ref 0.86–1.14)
MCH RBC QN AUTO: 32.5 PG (ref 26.5–33)
MCHC RBC AUTO-ENTMCNC: 32.3 G/DL (ref 31.5–36.5)
MCV RBC AUTO: 101 FL (ref 78–100)
PLATELET # BLD AUTO: 103 10E9/L (ref 150–450)
POTASSIUM SERPL-SCNC: 3.8 MMOL/L (ref 3.4–5.3)
PROT SERPL-MCNC: 7.5 G/DL (ref 6.8–8.8)
RBC # BLD AUTO: 3.48 10E12/L (ref 3.8–5.2)
SODIUM SERPL-SCNC: 139 MMOL/L (ref 133–144)
WBC # BLD AUTO: 3.3 10E9/L (ref 4–11)

## 2021-05-18 PROCEDURE — 85027 COMPLETE CBC AUTOMATED: CPT | Performed by: INTERNAL MEDICINE

## 2021-05-18 PROCEDURE — 85610 PROTHROMBIN TIME: CPT | Performed by: INTERNAL MEDICINE

## 2021-05-18 PROCEDURE — 80076 HEPATIC FUNCTION PANEL: CPT | Performed by: INTERNAL MEDICINE

## 2021-05-18 PROCEDURE — 76705 ECHO EXAM OF ABDOMEN: CPT | Mod: GC | Performed by: RADIOLOGY

## 2021-05-18 PROCEDURE — 36415 COLL VENOUS BLD VENIPUNCTURE: CPT | Performed by: INTERNAL MEDICINE

## 2021-05-18 PROCEDURE — 99214 OFFICE O/P EST MOD 30 MIN: CPT | Performed by: INTERNAL MEDICINE

## 2021-05-18 PROCEDURE — 82105 ALPHA-FETOPROTEIN SERUM: CPT | Performed by: INTERNAL MEDICINE

## 2021-05-18 PROCEDURE — 80048 BASIC METABOLIC PNL TOTAL CA: CPT | Performed by: INTERNAL MEDICINE

## 2021-05-18 NOTE — PROGRESS NOTES
Hi this patient was in for labs today and mentioned an ammonia blood test that she said you were going to order, we jennifer the blood and processed it accordingly if you could please order that test to be added on, thank you. -Ynes BENNETT

## 2021-05-18 NOTE — PROGRESS NOTES
I had the pleasure of seeing Susan Puckett for followup in the Liver Transplant Clinic at the [facility] on 05/18/2021.  Ms. 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  She has also had problems with ascites in the past and is currently complaining of increasing fluid retention.    HISTORY OF PRESENT ILLNESS:  She is not doing well at this point in time.  She is complaining of worsening fatigue; she also notes some increase in fluid retention that is troubling to her.  Since she was last seen, she has not required a thoracentesis or a paracentesis but her abdomen feels full.    She denies any donny abdominal pain.  Her itching is better controlled on the rifampin but still present.  She does not have any sores on her skin.  She is having difficulty sleeping at night.  She has not had any gastrointestinal bleeding or any overt signs of hepatic encephalopathy.    She denies any fevers or chills.  She has a dry cough but no shortness of breath.  She is vaccinated against COVID-19.  She does complain of some vomiting about 2 times per week.  She does not have regular nausea.  She is having 2-5 bowel movements per day without blood.  She reports her appetite is poor and food just does not smell good to her and it is unclear whether she has gained or lost weight.    Current Outpatient Medications   Medication     amylase-lipase-protease (CREON 6) [id number] units CPEP     atorvastatin (LIPITOR) 20 MG tablet     calcium carbonate (TUMS) 500 MG chewable tablet     calcium citrate-vitamin D (CITRACAL W/D) 250-100 MG-UNIT tablet     cholecalciferol (VITAMIN D-1000 MAX ST) 25 MCG (1000 UT) TABS     cyanocobalamin (VITAMIN B-12) 1000 MCG tablet     Ferrous Sulfate (IRON) 325 (65 FE) MG tablet     folic acid (FOLVITE) 1 MG tablet     furosemide (LASIX) 40 MG tablet     hydrOXYzine (ATARAX) 25 MG tablet      insulin glargine (LANTUS PEN) 100 UNIT/ML pen     lactulose 20 GM/30ML SOLN     Multiple Vitamin (MULTIVITAMIN PO)     multivitamin (DEKAS ESSENTIAL) capsule     omeprazole (PRILOSEC) 20 MG DR capsule     ondansetron (ZOFRAN-ODT) 4 MG ODT tab     polyethylene glycol (MIRALAX) 17 g packet     rifampin (RIFADIN) 300 MG capsule     rifaximin (XIFAXAN) 550 MG TABS tablet     simethicone (MYLICON) 80 MG chewable tablet     sitagliptin (JANUVIA) 100 MG tablet     spironolactone (ALDACTONE) 100 MG tablet     SUMAtriptan (IMITREX) 50 MG tablet     traZODone (DESYREL) 100 MG tablet     valACYclovir (VALTREX) 1000 mg tablet     venlafaxine (EFFEXOR) 75 MG tablet     vitamin A 3 MG (77756 UNITS) capsule     vitamin C (ASCORBIC ACID) 100 MG tablet     vitamin D3 (CHOLECALCIFEROL) 50 mcg (2000 units) tablet     vitamin E (TOCOPHEROL) 400 units (180 mg) capsule     Vitamin K, Phytonadione, 100 MCG TABS     levothyroxine (SYNTHROID/LEVOTHROID) 175 MCG tablet     No current facility-administered medications for this visit.      /72   Pulse 80   Wt 71.3 kg (157 lb 1.6 oz)   SpO2 100%   Breastfeeding No   BMI 25.36 kg/m      In general she looks largely unchanged.  HEENT exam shows no scleral icterus or temporal muscle wasting. Her chest is clear.  Abdominal exam shows no obvious ascites.  There is no masses or tenderness palpation.  Her liver is 10- without left lobe enlargement.  No spleen tip is palpable.  Extremity exam shows 1+ edema.  Skin exam shows no stigmata chronic liver disease and neurologic exam shows no asterixis.    Recent Results (from the past 168 hour(s))   AFP tumor marker [[id number]]    Collection Time: 05/18/21  3:11 PM   Result Value Ref Range    Alpha Fetoprotein 3.5 0 - 8 ug/L   INR [[id number]]    Collection Time: 05/18/21  3:11 PM   Result Value Ref Range    INR 1.07 0.86 - 1.14   CBC with platelets [[id number]]    Collection Time: 05/18/21  3:11 PM   Result Value Ref Range    WBC 3.3 (L) 4.0 - 11.0  10e9/L    RBC Count 3.48 (L) 3.8 - 5.2 10e12/L    Hemoglobin 11.3 (L) 11.7 - 15.7 g/dL    Hematocrit 35.0 35.0 - 47.0 %     (H) 78 - 100 fl    MCH 32.5 26.5 - 33.0 pg    MCHC 32.3 31.5 - 36.5 g/dL    RDW 13.4 10.0 - 15.0 %    Platelet Count 103 (L) 150 - 450 10e9/L   Hepatic Panel [LAB20]    Collection Time: 05/18/21  3:11 PM   Result Value Ref Range    Bilirubin Direct 0.2 0.0 - 0.2 mg/dL    Bilirubin Total 0.5 0.2 - 1.3 mg/dL    Albumin 2.8 (L) 3.4 - 5.0 g/dL    Protein Total 7.5 6.8 - 8.8 g/dL    Alkaline Phosphatase 119 40 - 150 U/L    ALT 33 0 - 50 U/L    AST 40 0 - 45 U/L   Basic metabolic panel [LAB15]    Collection Time: 05/18/21  3:11 PM   Result Value Ref Range    Sodium 139 133 - 144 mmol/L    Potassium 3.8 3.4 - 5.3 mmol/L    Chloride 105 94 - 109 mmol/L    Carbon Dioxide 32 20 - 32 mmol/L    Anion Gap 2 (L) 3 - 14 mmol/L    Glucose 61 (L) 70 - 99 mg/dL    Urea Nitrogen 11 7 - 30 mg/dL    Creatinine 0.94 0.52 - 1.04 mg/dL    GFR Estimate 71 >60 mL/min/[1.73_m2]    GFR Estimate If Black 83 >60 mL/min/[1.73_m2]    Calcium 8.7 8.5 - 10.1 mg/dL      My impression is that Ms. Puckett has cirrhosis, most likely caused by nonalcoholic fatty liver disease.  She is in the process of being evaluated for liver transplantation.  We also discussed TIPS as a way of managing her hepatic hydrothorax.  However, it appears on exam today that that does not appear to be a significant problem.  The ultrasound will tell us whether she has significant ascites.      She is completely vaccinated against COVID-19 and otherwise up to date with regards to other cancer screening and vaccines.    She has 3 people who are still being actively evaluated for liver donation.  We still have not made a decision whether we are going to move forward with transplantation or TIPS, however.  My plan will be to see the patient back in the clinic in 3 months.    Thank you very much for allowing me to participate in the care of this patient.   If you have any questions regarding my recommendations, please do not hesitate to contact me.         Raphael Cook MD      Professor of Medicine  [employer]      Executive Medical Director, Solid Organ Transplant Program  [facility]

## 2021-05-18 NOTE — LETTER
5/18/2021         RE: Susan Puckett  [address] [facility] [id number]        Dear Colleague,    Thank you for referring your patient, Susan Puckett, to the [facility]PATOLOG[facility] [place]. Please see a copy of my visit note below.    I had the pleasure of seeing Susan Puckett for followup in the Liver Transplant Clinic at the [facility] on 05/18/2021.  Ms. 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  She has also had problems with ascites in the past and is currently complaining of increasing fluid retention.    HISTORY OF PRESENT ILLNESS:  She is not doing well at this point in time.  She is complaining of worsening fatigue; she also notes some increase in fluid retention that is troubling to her.  Since she was last seen, she has not required a thoracentesis or a paracentesis but her abdomen feels full.    She denies any donny abdominal pain.  Her itching is better controlled on the rifampin but still present.  She does not have any sores on her skin.  She is having difficulty sleeping at night.  She has not had any gastrointestinal bleeding or any overt signs of hepatic encephalopathy.    She denies any fevers or chills.  She has a dry cough but no shortness of breath.  She is vaccinated against COVID-19.  She does complain of some vomiting about 2 times per week.  She does not have regular nausea.  She is having 2-5 bowel movements per day without blood.  She reports her appetite is poor and food just does not smell good to her and it is unclear whether she has gained or lost weight.    Current Outpatient Medications   Medication     amylase-lipase-protease (CREON 6) [id number] units CPEP     atorvastatin (LIPITOR) 20 MG tablet     calcium carbonate (TUMS) 500 MG chewable tablet     calcium citrate-vitamin D (CITRACAL W/D) 250-100 MG-UNIT tablet      cholecalciferol (VITAMIN D-1000 MAX ST) 25 MCG (1000 UT) TABS     cyanocobalamin (VITAMIN B-12) 1000 MCG tablet     Ferrous Sulfate (IRON) 325 (65 FE) MG tablet     folic acid (FOLVITE) 1 MG tablet     furosemide (LASIX) 40 MG tablet     hydrOXYzine (ATARAX) 25 MG tablet     insulin glargine (LANTUS PEN) 100 UNIT/ML pen     lactulose 20 GM/30ML SOLN     Multiple Vitamin (MULTIVITAMIN PO)     multivitamin (DEKAS ESSENTIAL) capsule     omeprazole (PRILOSEC) 20 MG DR capsule     ondansetron (ZOFRAN-ODT) 4 MG ODT tab     polyethylene glycol (MIRALAX) 17 g packet     rifampin (RIFADIN) 300 MG capsule     rifaximin (XIFAXAN) 550 MG TABS tablet     simethicone (MYLICON) 80 MG chewable tablet     sitagliptin (JANUVIA) 100 MG tablet     spironolactone (ALDACTONE) 100 MG tablet     SUMAtriptan (IMITREX) 50 MG tablet     traZODone (DESYREL) 100 MG tablet     valACYclovir (VALTREX) 1000 mg tablet     venlafaxine (EFFEXOR) 75 MG tablet     vitamin A 3 MG (02172 UNITS) capsule     vitamin C (ASCORBIC ACID) 100 MG tablet     vitamin D3 (CHOLECALCIFEROL) 50 mcg (2000 units) tablet     vitamin E (TOCOPHEROL) 400 units (180 mg) capsule     Vitamin K, Phytonadione, 100 MCG TABS     levothyroxine (SYNTHROID/LEVOTHROID) 175 MCG tablet     No current facility-administered medications for this visit.      /72   Pulse 80   Wt 71.3 kg (157 lb 1.6 oz)   SpO2 100%   Breastfeeding No   BMI 25.36 kg/m      In general she looks largely unchanged.  HEENT exam shows no scleral icterus or temporal muscle wasting. Her chest is clear.  Abdominal exam shows no obvious ascites.  There is no masses or tenderness palpation.  Her liver is 10- without left lobe enlargement.  No spleen tip is palpable.  Extremity exam shows 1+ edema.  Skin exam shows no stigmata chronic liver disease and neurologic exam shows no asterixis.    Recent Results (from the past 168 hour(s))   AFP tumor marker [NUJ992]    Collection Time: 05/18/21  3:11 PM   Result  Value Ref Range    Alpha Fetoprotein 3.5 0 - 8 ug/L   INR [[id number]]    Collection Time: 05/18/21  3:11 PM   Result Value Ref Range    INR 1.07 0.86 - 1.14   CBC with platelets [[id number]]    Collection Time: 05/18/21  3:11 PM   Result Value Ref Range    WBC 3.3 (L) 4.0 - 11.0 10e9/L    RBC Count 3.48 (L) 3.8 - 5.2 10e12/L    Hemoglobin 11.3 (L) 11.7 - 15.7 g/dL    Hematocrit 35.0 35.0 - 47.0 %     (H) 78 - 100 fl    MCH 32.5 26.5 - 33.0 pg    MCHC 32.3 31.5 - 36.5 g/dL    RDW 13.4 10.0 - 15.0 %    Platelet Count 103 (L) 150 - 450 10e9/L   Hepatic Panel [LAB20]    Collection Time: 05/18/21  3:11 PM   Result Value Ref Range    Bilirubin Direct 0.2 0.0 - 0.2 mg/dL    Bilirubin Total 0.5 0.2 - 1.3 mg/dL    Albumin 2.8 (L) 3.4 - 5.0 g/dL    Protein Total 7.5 6.8 - 8.8 g/dL    Alkaline Phosphatase 119 40 - 150 U/L    ALT 33 0 - 50 U/L    AST 40 0 - 45 U/L   Basic metabolic panel [LAB15]    Collection Time: 05/18/21  3:11 PM   Result Value Ref Range    Sodium 139 133 - 144 mmol/L    Potassium 3.8 3.4 - 5.3 mmol/L    Chloride 105 94 - 109 mmol/L    Carbon Dioxide 32 20 - 32 mmol/L    Anion Gap 2 (L) 3 - 14 mmol/L    Glucose 61 (L) 70 - 99 mg/dL    Urea Nitrogen 11 7 - 30 mg/dL    Creatinine 0.94 0.52 - 1.04 mg/dL    GFR Estimate 71 >60 mL/min/[1.73_m2]    GFR Estimate If Black 83 >60 mL/min/[1.73_m2]    Calcium 8.7 8.5 - 10.1 mg/dL      My impression is that Ms. Puckett has cirrhosis, most likely caused by nonalcoholic fatty liver disease.  She is in the process of being evaluated for liver transplantation.  We also discussed TIPS as a way of managing her hepatic hydrothorax.  However, it appears on exam today that that does not appear to be a significant problem.  The ultrasound will tell us whether she has significant ascites.      She is completely vaccinated against COVID-19 and otherwise up to date with regards to other cancer screening and vaccines.    She has 3 people who are still being actively evaluated for  liver donation.  We still have not made a decision whether we are going to move forward with transplantation or TIPS, however.  My plan will be to see the patient back in the clinic in 3 months.    Thank you very much for allowing me to participate in the care of this patient.  If you have any questions regarding my recommendations, please do not hesitate to contact me.         Raphael Cook MD      Professor of Medicine  [employer]      Executive Medical Director, Solid Organ Transplant Program  [facility]

## 2021-05-20 ENCOUNTER — PATIENT OUTREACH (OUTPATIENT)
Dept: GASTROENTEROLOGY | Facility: CLINIC | Age: 51
End: 2021-05-20

## 2021-05-20 ENCOUNTER — MYC MEDICAL ADVICE (OUTPATIENT)
Dept: GASTROENTEROLOGY | Facility: CLINIC | Age: 51
End: 2021-05-20

## 2021-05-20 DIAGNOSIS — R18.8 CIRRHOSIS OF LIVER WITH ASCITES, UNSPECIFIED HEPATIC CIRRHOSIS TYPE (H): Primary | ICD-10-CM

## 2021-05-20 DIAGNOSIS — K74.60 CIRRHOSIS OF LIVER WITH ASCITES, UNSPECIFIED HEPATIC CIRRHOSIS TYPE (H): Primary | ICD-10-CM

## 2021-05-20 NOTE — PROGRESS NOTES
Patient called to discuss imaging results. Reviewed results and answered questions. She states she is feeling increasingly fatigued, has a slight cough with shortness of breath only with increased exertion, and feeling increased lower extremity swelling and fluid retention. She has gained some weight, and cannot attribute this to increased food intake. Patient very teary on the phone, and stressed regarding her overall health condition. Discussed getting plenty of rest, drinking adequate liquids each day, eating well balanced meals including protein, adjusting lactulose as needed to maintain adequate stools daily. Patient inquiring if she can increase diuretics at all to manage this fluid.

## 2021-05-27 NOTE — PROGRESS NOTES
Patient states she continues to feel poorly. She states her abdomen is full, hard, and distended. She is having shortness of breath with exertion and about 5-6 days ago she began having a cough at night and a heavy feeling in her right chest. She does report that with compression stockings, the lower extremity swelling has improved. However she is concerned she may have increased abdominal fluid and increased fluid around her lung due to her symptoms.

## 2021-05-28 NOTE — PROGRESS NOTES
Reviewed plan per Dr. Cook of abdominal ultrasound to assess for increased abdominal fluid. Patient agreeable to this. She is also requesting chest xray to assess for fluid around right lung. She states home care nurse was out yesterday and heard crackles on the right. Patient continues with persistent cough, SOB with exertion, and abdominal distention and firmness. She has needed thoracentesis x 2 in the past, most recently 3/31/21 She will schedule ultrasound to be done today, and would like to complete chest xray as well.

## 2021-06-02 ENCOUNTER — ANCILLARY PROCEDURE (OUTPATIENT)
Dept: ULTRASOUND IMAGING | Facility: CLINIC | Age: 51
End: 2021-06-02
Attending: INTERNAL MEDICINE
Payer: COMMERCIAL

## 2021-06-02 DIAGNOSIS — K74.60 CIRRHOSIS OF LIVER WITH ASCITES, UNSPECIFIED HEPATIC CIRRHOSIS TYPE (H): ICD-10-CM

## 2021-06-02 DIAGNOSIS — R18.8 CIRRHOSIS OF LIVER WITH ASCITES, UNSPECIFIED HEPATIC CIRRHOSIS TYPE (H): ICD-10-CM

## 2021-06-02 PROCEDURE — 76705 ECHO EXAM OF ABDOMEN: CPT

## 2021-06-03 ENCOUNTER — TELEPHONE (OUTPATIENT)
Dept: TRANSPLANT | Facility: CLINIC | Age: 51
End: 2021-06-03

## 2021-06-03 DIAGNOSIS — K76.82 HEPATIC ENCEPHALOPATHY (H): ICD-10-CM

## 2021-06-03 RX ORDER — LACTULOSE 20 G/30ML
30 SOLUTION ORAL 3 TIMES DAILY
Qty: 4000 ML | Refills: 4 | Status: SHIPPED | OUTPATIENT
Start: 2021-06-03 | End: 2021-07-09

## 2021-06-03 NOTE — TELEPHONE ENCOUNTER
Susan called to ask if we would take over refilling lactulose and rifaximin vs local GI.  She just had them filled, will notify next refill needed and will send refill from our system at that time.    Was in ED for sciatica after gardening in [facility] ED.  Was given short term (6 tabs) oxycodone and Gabapentin.  Has only taken one tablet of Oxy since last Friday and no gabapentin.  Told ehr to exercise caution as liver doesn't metabolize meds as well, avoid with other sedating meds, and try non-pharma forms of relief as much as possible  (heat, stretch, chiropractor, etc.)

## 2021-06-16 ENCOUNTER — TRANSFERRED RECORDS (OUTPATIENT)
Dept: HEALTH INFORMATION MANAGEMENT | Facility: CLINIC | Age: 51
End: 2021-06-16

## 2021-06-18 ENCOUNTER — PATIENT OUTREACH (OUTPATIENT)
Dept: GASTROENTEROLOGY | Facility: CLINIC | Age: 51
End: 2021-06-18

## 2021-06-18 ENCOUNTER — DOCUMENTATION ONLY (OUTPATIENT)
Dept: TRANSPLANT | Facility: CLINIC | Age: 51
End: 2021-06-18

## 2021-06-18 DIAGNOSIS — K75.81 LIVER CIRRHOSIS SECONDARY TO NASH (H): ICD-10-CM

## 2021-06-18 DIAGNOSIS — K74.60 LIVER CIRRHOSIS SECONDARY TO NASH (H): ICD-10-CM

## 2021-06-18 DIAGNOSIS — J90 RECURRENT RIGHT PLEURAL EFFUSION: Primary | ICD-10-CM

## 2021-06-18 NOTE — Clinical Note
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  She had three large volume thoracentesis this week.  Do you want her set up for any [facility] prn as outpatient?  You have an appointment with her on 7/7/21.  Please let me know if anything else for her, thanks.

## 2021-06-18 NOTE — PROGRESS NOTES
Spoke with Susan. Admitted and re-admitted again at [facility] this week.  Originally for significant hypoglycemia, but also had 3 larger volume thoracentesis done (Note in CE).  Patient also had one thoracentesis done early April.  Reports breathing much improved since discharge, but discussing pain management with PCP.

## 2021-06-18 NOTE — PROGRESS NOTES
Per chart review, pt was hospitalized 6/16-6/17 for worsening shortness of breath and chest pain. Pt had a thoracentesis on 6/16 and 6/17 and was discharged on increased diuretics (lasix 100 mg daily and spironolactone 125 mg daily). Pt also had a thoracentesis done on 6/13 and 500 ml of fluid removed.     Called pt to check in and review symptoms. Pt stated that she felt much better since last thoracentesis and denied shortness of breath. Pt is having back pain and is wondering if she is sore from the thoracentesis. Pt plans to reach out to PCP for assistance with pain management after thoracentesis and has an appointment with PCP on 6/21.     Pt confirmed that she is currently taking furosemide 100 mg daily and spironolactone 150 mg daily. Denied lower extremity edema but feels that abdomen is distended. CT of abdomen and pelvis on 6/16 showed mild ascites. Reviewed with pt that increased diuretic dosing should help with fluid accumulation. Discussed pt's recent thoracentesis and Dr. Cook agreeable to setting pt up with as needed outpatient thoracentesis. Pt agreeable to this plan and requested thoracentesis be arranged at [facility]. Orders sent and pt provided with scheduling number for [facility].     Pt denied confusion and is taking lactulose and xifaxan. Pt is taking lactulose 30 ml 3 times daily and is having 2-3 BMs/day. Reviewed lactulose titration to achieve 3-5 BMs/day.    Pt has hepatology follow with Dr. Cook on 7/7. Plan to check in with pt in 1-2 weeks. Pt verbalized understanding and agreeable to plan of care.

## 2021-06-22 NOTE — PROGRESS NOTES
Pt notified writer that she had labs drawn at PCP appointment on 6/21 and creatinine was elevated at 1.13. Pt's local GI recommended that pt stay on current doses of diuretics and repeat labs on 7/5. Pt plans to call and schedule thoracentesis appointment for later this week at [facility]. Pt has in person appointment with Dr. Cook on 7/7. Plan to update Dr. Cook on pt's labs and current diuretic regimen. Will check in with pt in 1 week.

## 2021-06-24 ENCOUNTER — TRANSFERRED RECORDS (OUTPATIENT)
Dept: HEALTH INFORMATION MANAGEMENT | Facility: CLINIC | Age: 51
End: 2021-06-24

## 2021-06-25 ENCOUNTER — DOCUMENTATION ONLY (OUTPATIENT)
Dept: TRANSPLANT | Facility: CLINIC | Age: 51
End: 2021-06-25

## 2021-06-25 NOTE — PROGRESS NOTES
Spoke with Susan, have prn orders for thora outpatient.  She has orders established at [facility], will continue to get there at preferred location.  Was evaluated a few days ago and limited fluid seen on ultrasound, no thora done.      If patient prefers to have done within [facility], standing orders for prn weekly thora approved by Dr. Cook.

## 2021-07-01 ENCOUNTER — PATIENT OUTREACH (OUTPATIENT)
Dept: GASTROENTEROLOGY | Facility: CLINIC | Age: 51
End: 2021-07-01

## 2021-07-01 DIAGNOSIS — K74.60 LIVER CIRRHOSIS SECONDARY TO NASH (H): Primary | ICD-10-CM

## 2021-07-01 DIAGNOSIS — K75.81 LIVER CIRRHOSIS SECONDARY TO NASH (H): Primary | ICD-10-CM

## 2021-07-02 NOTE — PROGRESS NOTES
"Pt reported uncontrolled nausea and headaches. Pt stated that she has not been able to sleep due to these symptoms. Pt is attributing nausea and headaches to uncontrolled blood sugars and stated that her sugars are \"all over the place\". Pt is working with endocrine team to get better blood glucose control and will be getting a new insulin pump at the end of July. Pt has been taking zofran as needed for nausea but stated that it isn't helping. Encouraged pt to reach out to primary care for nausea management and to reach out to endocrine to see if she can an earlier appointment for a new pump.     Pt is taking lactulose and having 3-5 BMs/day. Denied confusion and is also taking xifaxan. Pt denied shortness of breath and has not needed a thoracentesis since 6/19. Pt has an appointment with Dr. Cook on 7/7 and will get labs drawn on 7/5. Pt also has an appointment with IR to discuss TIPS on 7/12.   "

## 2021-07-05 DIAGNOSIS — R18.8 OTHER ASCITES: ICD-10-CM

## 2021-07-05 DIAGNOSIS — K75.81 LIVER CIRRHOSIS SECONDARY TO NASH (H): ICD-10-CM

## 2021-07-05 DIAGNOSIS — K74.60 LIVER CIRRHOSIS SECONDARY TO NASH (H): ICD-10-CM

## 2021-07-05 DIAGNOSIS — K75.81 NASH (NONALCOHOLIC STEATOHEPATITIS): ICD-10-CM

## 2021-07-05 LAB
ALBUMIN SERPL-MCNC: 2.6 G/DL (ref 3.4–5)
ALP SERPL-CCNC: 133 U/L (ref 40–150)
ALT SERPL W P-5'-P-CCNC: 24 U/L (ref 0–50)
ANION GAP SERPL CALCULATED.3IONS-SCNC: 5 MMOL/L (ref 3–14)
AST SERPL W P-5'-P-CCNC: 33 U/L (ref 0–45)
BASOPHILS # BLD AUTO: 0 10E9/L (ref 0–0.2)
BASOPHILS NFR BLD AUTO: 0.5 %
BILIRUB DIRECT SERPL-MCNC: 0.2 MG/DL (ref 0–0.2)
BILIRUB SERPL-MCNC: 0.5 MG/DL (ref 0.2–1.3)
BUN SERPL-MCNC: 11 MG/DL (ref 7–30)
CALCIUM SERPL-MCNC: 7.9 MG/DL (ref 8.5–10.1)
CHLORIDE SERPL-SCNC: 107 MMOL/L (ref 94–109)
CO2 SERPL-SCNC: 29 MMOL/L (ref 20–32)
CREAT SERPL-MCNC: 1.1 MG/DL (ref 0.52–1.04)
DIFFERENTIAL METHOD BLD: ABNORMAL
EOSINOPHIL # BLD AUTO: 0.1 10E9/L (ref 0–0.7)
EOSINOPHIL NFR BLD AUTO: 2.7 %
ERYTHROCYTE [DISTWIDTH] IN BLOOD BY AUTOMATED COUNT: 14.7 % (ref 10–15)
GFR SERPL CREATININE-BSD FRML MDRD: 59 ML/MIN/{1.73_M2}
GLUCOSE SERPL-MCNC: 147 MG/DL (ref 70–99)
HCT VFR BLD AUTO: 34.4 % (ref 35–47)
HGB BLD-MCNC: 11.5 G/DL (ref 11.7–15.7)
IMM GRANULOCYTES # BLD: 0 10E9/L (ref 0–0.4)
IMM GRANULOCYTES NFR BLD: 0 %
INR PPP: 1.18 (ref 0.86–1.14)
LYMPHOCYTES # BLD AUTO: 1.1 10E9/L (ref 0.8–5.3)
LYMPHOCYTES NFR BLD AUTO: 27.3 %
MCH RBC QN AUTO: 32.4 PG (ref 26.5–33)
MCHC RBC AUTO-ENTMCNC: 33.4 G/DL (ref 31.5–36.5)
MCV RBC AUTO: 97 FL (ref 78–100)
MONOCYTES # BLD AUTO: 0.3 10E9/L (ref 0–1.3)
MONOCYTES NFR BLD AUTO: 7.2 %
NEUTROPHILS # BLD AUTO: 2.5 10E9/L (ref 1.6–8.3)
NEUTROPHILS NFR BLD AUTO: 62.3 %
PLATELET # BLD AUTO: 104 10E9/L (ref 150–450)
POTASSIUM SERPL-SCNC: 3 MMOL/L (ref 3.4–5.3)
PROT SERPL-MCNC: 6.9 G/DL (ref 6.8–8.8)
RBC # BLD AUTO: 3.55 10E12/L (ref 3.8–5.2)
SODIUM SERPL-SCNC: 141 MMOL/L (ref 133–144)
WBC # BLD AUTO: 4 10E9/L (ref 4–11)

## 2021-07-05 PROCEDURE — 85025 COMPLETE CBC W/AUTO DIFF WBC: CPT | Performed by: RADIOLOGY

## 2021-07-05 PROCEDURE — 80053 COMPREHEN METABOLIC PANEL: CPT | Performed by: RADIOLOGY

## 2021-07-05 PROCEDURE — 36415 COLL VENOUS BLD VENIPUNCTURE: CPT | Performed by: RADIOLOGY

## 2021-07-05 PROCEDURE — 85610 PROTHROMBIN TIME: CPT | Performed by: RADIOLOGY

## 2021-07-05 PROCEDURE — 82248 BILIRUBIN DIRECT: CPT | Performed by: RADIOLOGY

## 2021-07-06 RX ORDER — SPIRONOLACTONE 100 MG/1
250 TABLET, FILM COATED ORAL DAILY
COMMUNITY
Start: 2021-06-17 | End: 2021-08-26

## 2021-07-06 RX ORDER — FUROSEMIDE 40 MG
100 TABLET ORAL 2 TIMES DAILY
COMMUNITY
Start: 2021-06-17 | End: 2021-11-03

## 2021-07-07 ENCOUNTER — OFFICE VISIT (OUTPATIENT)
Dept: GASTROENTEROLOGY | Facility: CLINIC | Age: 51
End: 2021-07-07
Attending: INTERNAL MEDICINE
Payer: COMMERCIAL

## 2021-07-07 VITALS
BODY MASS INDEX: 25.6 KG/M2 | OXYGEN SATURATION: 100 % | DIASTOLIC BLOOD PRESSURE: 83 MMHG | HEART RATE: 61 BPM | TEMPERATURE: 97.7 F | SYSTOLIC BLOOD PRESSURE: 116 MMHG | WEIGHT: 158.6 LBS

## 2021-07-07 DIAGNOSIS — R18.8 CIRRHOSIS OF LIVER WITH ASCITES, UNSPECIFIED HEPATIC CIRRHOSIS TYPE (H): Primary | ICD-10-CM

## 2021-07-07 DIAGNOSIS — K74.60 CIRRHOSIS OF LIVER WITH ASCITES, UNSPECIFIED HEPATIC CIRRHOSIS TYPE (H): Primary | ICD-10-CM

## 2021-07-07 PROCEDURE — 99214 OFFICE O/P EST MOD 30 MIN: CPT | Performed by: INTERNAL MEDICINE

## 2021-07-07 ASSESSMENT — PAIN SCALES - GENERAL: PAINLEVEL: NO PAIN (0)

## 2021-07-07 NOTE — LETTER
7/7/2021         RE: Susan Puckett  [address]  [phone]        Dear Colleague,    Thank you for referring your patient, Susan Puckett, to the [facility] HEPATOLOG[facility] [place]. Please see a copy of my visit note below.    I had the pleasure of seeing Susan Puckett for followup in the Liver Transplant Clinic at the [facility] on 07/07/2021.  Ms. Puckett returns for followup of cirrhosis most likely on the basis of nonalcoholic fatty liver disease.  She is listed for liver transplantation but has a very low MELD score.  She does have a number of potential live donors that are currently going through the evaluation.    She continues not to feel well.  She still does complain of some right upper quadrant pain as well as some back pain that is pleuritic.  She does report some itching, which is better with rifampin.  She no longer has any source, but it still is present.  She does complain of profound fatigue.  She denies any increased abdominal girth or lower extremity edema.  She has not had any gastrointestinal bleeding or any overt signs of hepatic encephalopathy.    She denies any cough.  She does have some shortness of breath.  She denies any fevers or chills.  She is fully vaccinated against COVID-19.  She does complain of daily nausea with rare vomiting.  She is having 3-4 bowel movements per day on her current dose of lactulose.  She reports her appetite is poor and her weight has fluctuated.    She has not had a large volume paracentesis for a year.  She had several thoracenteses when she was in the hospital recently but none for 3 weeks now and most recent ultrasound showed inadequate amount of fluid to consider thoracentesis.    Current Outpatient Medications   Medication     amylase-lipase-protease (CREON 6) [id number] units CPEP     atorvastatin (LIPITOR) 20 MG tablet     calcium carbonate (TUMS) 500 MG chewable tablet     calcium  citrate-vitamin D (CITRACAL W/D) 250-100 MG-UNIT tablet     cyanocobalamin (VITAMIN B-12) 1000 MCG tablet     Ferrous Sulfate (IRON) 325 (65 FE) MG tablet     folic acid (FOLVITE) 1 MG tablet     furosemide (LASIX) 40 MG tablet     hydrOXYzine (ATARAX) 25 MG tablet     insulin glargine (LANTUS PEN) 100 UNIT/ML pen     lactulose 20 GM/30ML SOLN     levothyroxine (SYNTHROID/LEVOTHROID) 175 MCG tablet     Multiple Vitamin (MULTIVITAMIN PO)     multivitamin (DEKAS ESSENTIAL) capsule     omeprazole (PRILOSEC) 20 MG DR capsule     ondansetron (ZOFRAN-ODT) 4 MG ODT tab     polyethylene glycol (MIRALAX) 17 g packet     rifampin (RIFADIN) 300 MG capsule     rifaximin (XIFAXAN) 550 MG TABS tablet     simethicone (MYLICON) 80 MG chewable tablet     sitagliptin (JANUVIA) 100 MG tablet     spironolactone (ALDACTONE) 100 MG tablet     SUMAtriptan (IMITREX) 50 MG tablet     traZODone (DESYREL) 100 MG tablet     valACYclovir (VALTREX) 1000 mg tablet     venlafaxine (EFFEXOR) 75 MG tablet     vitamin A 3 MG (78506 UNITS) capsule     vitamin C (ASCORBIC ACID) 100 MG tablet     vitamin D3 (CHOLECALCIFEROL) 50 mcg (2000 units) tablet     vitamin E (TOCOPHEROL) 400 units (180 mg) capsule     Vitamin K, Phytonadione, 100 MCG TABS     cholecalciferol (VITAMIN D-1000 MAX ST) 25 MCG (1000 UT) TABS     No current facility-administered medications for this visit.      /83   Pulse 61   Temp 97.7  F (36.5  C) (Oral)   Wt 71.9 kg (158 lb 9.6 oz)   SpO2 100%   BMI 25.60 kg/m      PHYSICAL EXAMINATION:    GENERAL:  She looks relatively well.    HEENT:  Shows no scleral icterus or temporal muscle wasting.    LUNGS:  Her chest is clear.    ABDOMEN:  Shows no increase in girth.  No masses or tenderness to palpation are present.  Her liver is 10 cm in span.  There is some tenderness to palpation in the right upper quadrant.  No spleen tip is palpable.    EXTREMITIES:  Shows no edema.    SKIN:  Shows no stigmata of chronic liver disease.     NEUROLOGIC:  Shows no asterixis.    Recent Results (from the past 168 hour(s))   INR    Collection Time: 07/05/21 11:08 AM   Result Value Ref Range    INR 1.18 (H) 0.86 - 1.14   *CBC with platelets differential    Collection Time: 07/05/21 11:08 AM   Result Value Ref Range    WBC 4.0 4.0 - 11.0 10e9/L    RBC Count 3.55 (L) 3.8 - 5.2 10e12/L    Hemoglobin 11.5 (L) 11.7 - 15.7 g/dL    Hematocrit 34.4 (L) 35.0 - 47.0 %    MCV 97 78 - 100 fl    MCH 32.4 26.5 - 33.0 pg    MCHC 33.4 31.5 - 36.5 g/dL    RDW 14.7 10.0 - 15.0 %    Platelet Count 104 (L) 150 - 450 10e9/L    Diff Method Automated Method     % Neutrophils 62.3 %    % Lymphocytes 27.3 %    % Monocytes 7.2 %    % Eosinophils 2.7 %    % Basophils 0.5 %    % Immature Granulocytes 0.0 %    Absolute Neutrophil 2.5 1.6 - 8.3 10e9/L    Absolute Lymphocytes 1.1 0.8 - 5.3 10e9/L    Absolute Monocytes 0.3 0.0 - 1.3 10e9/L    Absolute Eosinophils 0.1 0.0 - 0.7 10e9/L    Absolute Basophils 0.0 0.0 - 0.2 10e9/L    Abs Immature Granulocytes 0.0 0 - 0.4 10e9/L   Comprehensive metabolic panel    Collection Time: 07/05/21 11:08 AM   Result Value Ref Range    Sodium 141 133 - 144 mmol/L    Potassium 3.0 (L) 3.4 - 5.3 mmol/L    Chloride 107 94 - 109 mmol/L    Carbon Dioxide 29 20 - 32 mmol/L    Anion Gap 5 3 - 14 mmol/L    Glucose 147 (H) 70 - 99 mg/dL    Urea Nitrogen 11 7 - 30 mg/dL    Creatinine 1.10 (H) 0.52 - 1.04 mg/dL    GFR Estimate 59 (L) >60 mL/min/[1.73_m2]    GFR Estimate If Black 68 >60 mL/min/[1.73_m2]    Calcium 7.9 (L) 8.5 - 10.1 mg/dL    Bilirubin Total 0.5 0.2 - 1.3 mg/dL    Albumin 2.6 (L) 3.4 - 5.0 g/dL    Protein Total 6.9 6.8 - 8.8 g/dL    Alkaline Phosphatase 133 40 - 150 U/L    ALT 24 0 - 50 U/L    AST 33 0 - 45 U/L   Bilirubin direct    Collection Time: 07/05/21 11:08 AM   Result Value Ref Range    Bilirubin Direct 0.2 0.0 - 0.2 mg/dL      IMPRESSION:  Ms. Puckett has cirrhosis, most likely on the basis of nonalcoholic fatty liver disease.  She has not  required a large volume paracentesis for a year and no thoracentesis for 3 weeks.  Her examination really shows minimal pleural fluid.  At this point in time, I would defer on a TIPS procedure.  She does have an appointment with the interventional radiologist this week.  If she were to require additional volume paracentesis, we are prepared to go forward on short notice.  I have encouraged her to continue to contact live donors as it seems that is the most likely way of getting her transplanted for a very poor quality of life.    Thank you very much for allowing me to participate in the care of this patient.  If you have any questions regarding my recommendations, please do not hesitate to contact me.         Raphael Cook MD      Professor of Medicine  [employer]      Executive Medical Director, Solid Organ Transplant Program  [facility]

## 2021-07-07 NOTE — PROGRESS NOTES
I had the pleasure of seeing Susan Puckett for followup in the Liver Transplant Clinic at the [facility] on 07/07/2021.  Ms. Puckett returns for followup of cirrhosis most likely on the basis of nonalcoholic fatty liver disease.  She is listed for liver transplantation but has a very low MELD score.  She does have a number of potential live donors that are currently going through the evaluation.    She continues not to feel well.  She still does complain of some right upper quadrant pain as well as some back pain that is pleuritic.  She does report some itching, which is better with rifampin.  She no longer has any source, but it still is present.  She does complain of profound fatigue.  She denies any increased abdominal girth or lower extremity edema.  She has not had any gastrointestinal bleeding or any overt signs of hepatic encephalopathy.    She denies any cough.  She does have some shortness of breath.  She denies any fevers or chills.  She is fully vaccinated against COVID-19.  She does complain of daily nausea with rare vomiting.  She is having 3-4 bowel movements per day on her current dose of lactulose.  She reports her appetite is poor and her weight has fluctuated.    She has not had a large volume paracentesis for a year.  She had several thoracenteses when she was in the hospital recently but none for 3 weeks now and most recent ultrasound showed inadequate amount of fluid to consider thoracentesis.    Current Outpatient Medications   Medication     amylase-lipase-protease (CREON 6) [id number] units CPEP     atorvastatin (LIPITOR) 20 MG tablet     calcium carbonate (TUMS) 500 MG chewable tablet     calcium citrate-vitamin D (CITRACAL W/D) 250-100 MG-UNIT tablet     cyanocobalamin (VITAMIN B-12) 1000 MCG tablet     Ferrous Sulfate (IRON) 325 (65 FE) MG tablet     folic acid (FOLVITE) 1 MG tablet     furosemide (LASIX) 40 MG tablet     hydrOXYzine (ATARAX) 25 MG tablet      insulin glargine (LANTUS PEN) 100 UNIT/ML pen     lactulose 20 GM/30ML SOLN     levothyroxine (SYNTHROID/LEVOTHROID) 175 MCG tablet     Multiple Vitamin (MULTIVITAMIN PO)     multivitamin (DEKAS ESSENTIAL) capsule     omeprazole (PRILOSEC) 20 MG DR capsule     ondansetron (ZOFRAN-ODT) 4 MG ODT tab     polyethylene glycol (MIRALAX) 17 g packet     rifampin (RIFADIN) 300 MG capsule     rifaximin (XIFAXAN) 550 MG TABS tablet     simethicone (MYLICON) 80 MG chewable tablet     sitagliptin (JANUVIA) 100 MG tablet     spironolactone (ALDACTONE) 100 MG tablet     SUMAtriptan (IMITREX) 50 MG tablet     traZODone (DESYREL) 100 MG tablet     valACYclovir (VALTREX) 1000 mg tablet     venlafaxine (EFFEXOR) 75 MG tablet     vitamin A 3 MG (30649 UNITS) capsule     vitamin C (ASCORBIC ACID) 100 MG tablet     vitamin D3 (CHOLECALCIFEROL) 50 mcg (2000 units) tablet     vitamin E (TOCOPHEROL) 400 units (180 mg) capsule     Vitamin K, Phytonadione, 100 MCG TABS     cholecalciferol (VITAMIN D-1000 MAX ST) 25 MCG (1000 UT) TABS     No current facility-administered medications for this visit.      /83   Pulse 61   Temp 97.7  F (36.5  C) (Oral)   Wt 71.9 kg (158 lb 9.6 oz)   SpO2 100%   BMI 25.60 kg/m      PHYSICAL EXAMINATION:    GENERAL:  She looks relatively well.    HEENT:  Shows no scleral icterus or temporal muscle wasting.    LUNGS:  Her chest is clear.    ABDOMEN:  Shows no increase in girth.  No masses or tenderness to palpation are present.  Her liver is 10 cm in span.  There is some tenderness to palpation in the right upper quadrant.  No spleen tip is palpable.    EXTREMITIES:  Shows no edema.    SKIN:  Shows no stigmata of chronic liver disease.    NEUROLOGIC:  Shows no asterixis.    Recent Results (from the past 168 hour(s))   INR    Collection Time: 07/05/21 11:08 AM   Result Value Ref Range    INR 1.18 (H) 0.86 - 1.14   *CBC with platelets differential    Collection Time: 07/05/21 11:08 AM   Result Value Ref  Range    WBC 4.0 4.0 - 11.0 10e9/L    RBC Count 3.55 (L) 3.8 - 5.2 10e12/L    Hemoglobin 11.5 (L) 11.7 - 15.7 g/dL    Hematocrit 34.4 (L) 35.0 - 47.0 %    MCV 97 78 - 100 fl    MCH 32.4 26.5 - 33.0 pg    MCHC 33.4 31.5 - 36.5 g/dL    RDW 14.7 10.0 - 15.0 %    Platelet Count 104 (L) 150 - 450 10e9/L    Diff Method Automated Method     % Neutrophils 62.3 %    % Lymphocytes 27.3 %    % Monocytes 7.2 %    % Eosinophils 2.7 %    % Basophils 0.5 %    % Immature Granulocytes 0.0 %    Absolute Neutrophil 2.5 1.6 - 8.3 10e9/L    Absolute Lymphocytes 1.1 0.8 - 5.3 10e9/L    Absolute Monocytes 0.3 0.0 - 1.3 10e9/L    Absolute Eosinophils 0.1 0.0 - 0.7 10e9/L    Absolute Basophils 0.0 0.0 - 0.2 10e9/L    Abs Immature Granulocytes 0.0 0 - 0.4 10e9/L   Comprehensive metabolic panel    Collection Time: 07/05/21 11:08 AM   Result Value Ref Range    Sodium 141 133 - 144 mmol/L    Potassium 3.0 (L) 3.4 - 5.3 mmol/L    Chloride 107 94 - 109 mmol/L    Carbon Dioxide 29 20 - 32 mmol/L    Anion Gap 5 3 - 14 mmol/L    Glucose 147 (H) 70 - 99 mg/dL    Urea Nitrogen 11 7 - 30 mg/dL    Creatinine 1.10 (H) 0.52 - 1.04 mg/dL    GFR Estimate 59 (L) >60 mL/min/[1.73_m2]    GFR Estimate If Black 68 >60 mL/min/[1.73_m2]    Calcium 7.9 (L) 8.5 - 10.1 mg/dL    Bilirubin Total 0.5 0.2 - 1.3 mg/dL    Albumin 2.6 (L) 3.4 - 5.0 g/dL    Protein Total 6.9 6.8 - 8.8 g/dL    Alkaline Phosphatase 133 40 - 150 U/L    ALT 24 0 - 50 U/L    AST 33 0 - 45 U/L   Bilirubin direct    Collection Time: 07/05/21 11:08 AM   Result Value Ref Range    Bilirubin Direct 0.2 0.0 - 0.2 mg/dL      IMPRESSION:  Ms. Puckett has cirrhosis, most likely on the basis of nonalcoholic fatty liver disease.  She has not required a large volume paracentesis for a year and no thoracentesis for 3 weeks.  Her examination really shows minimal pleural fluid.  At this point in time, I would defer on a TIPS procedure.  She does have an appointment with the interventional radiologist this week.   If she were to require additional volume paracentesis, we are prepared to go forward on short notice.  I have encouraged her to continue to contact live donors as it seems that is the most likely way of getting her transplanted for a very poor quality of life.    Thank you very much for allowing me to participate in the care of this patient.  If you have any questions regarding my recommendations, please do not hesitate to contact me.         Raphael Cook MD      Professor of Medicine  [employer]      Executive Medical Director, Solid Organ Transplant Program  [facility]

## 2021-07-08 ENCOUNTER — PATIENT OUTREACH (OUTPATIENT)
Dept: GASTROENTEROLOGY | Facility: CLINIC | Age: 51
End: 2021-07-08

## 2021-07-08 NOTE — PROGRESS NOTES
Pt had hepatology appointment with Dr. Cook on 7/7. Plan established per Dr. Cook:    1) Continue diuretics at current doses- Lasix 100 mg daily and Aldactone 250 mg daily  2) Defer on TIPS procedure- has not required a large volume paracentesis for a year and no thoracentesis for 3 weeks. Minimal pleural fluid on exam.  3) Continue to contact live donors for liver transplant   4) Follow up in clinic in 3 months (10/7/2021)    Plan to check in with pt in 1-2 weeks.

## 2021-07-09 DIAGNOSIS — K76.82 HEPATIC ENCEPHALOPATHY (H): ICD-10-CM

## 2021-07-09 RX ORDER — LACTULOSE 10 G/15ML
SOLUTION ORAL
Qty: 3784 ML | Refills: 5 | Status: ON HOLD | OUTPATIENT
Start: 2021-07-09 | End: 2022-07-14

## 2021-07-12 ENCOUNTER — VIRTUAL VISIT (OUTPATIENT)
Dept: VASCULAR SURGERY | Facility: CLINIC | Age: 51
End: 2021-07-12
Payer: COMMERCIAL

## 2021-07-12 DIAGNOSIS — K76.9 PLEURAL EFFUSION ASSOCIATED WITH HEPATIC DISORDER: ICD-10-CM

## 2021-07-12 DIAGNOSIS — K75.81 NASH (NONALCOHOLIC STEATOHEPATITIS): Primary | ICD-10-CM

## 2021-07-12 DIAGNOSIS — K76.82 HEPATIC ENCEPHALOPATHY (H): ICD-10-CM

## 2021-07-12 DIAGNOSIS — J91.8 PLEURAL EFFUSION ASSOCIATED WITH HEPATIC DISORDER: ICD-10-CM

## 2021-07-12 PROCEDURE — 99214 OFFICE O/P EST MOD 30 MIN: CPT | Mod: 95 | Performed by: RADIOLOGY

## 2021-07-12 NOTE — LETTER
7/12/2021       RE: Susan Puckett  [address] [facility] [address]     Dear Colleague,    Thank you for referring your patient, Susan Puckett, to the [facility] [place] at [facility]. Please see a copy of my visit note below.    Susan is a 50 year old who is being evaluated via a billable video visit.      How would you like to obtain your AVS? MyChart  If the video visit is dropped, the invitation should be resent by: Other e-mail: my chart connect  Will anyone else be joining your video visit? No      Video Start Time: 10:41am    Video-Visit Details    Type of service:  Video Visit    Video End Time:10:56am    Originating Location (pt. Location): Home    Distant Location (provider location):  [facility]     Platform used for Video Visit: [facility]             Interventional Radiology Clinic Visit    Date of this visit: 7/12/2021    Susan Puckett  is referred by Dr. Cook for treatment recommendations. 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  Her past medical history is most significant for gastric bypass surgery for obesity, diabetes and Du cirrhosis. Patient referred  For possible TIPS placement for recurrent pleural effusions.      She sees Dr. Cook from hepatology for cirrhosis and portal hypertension complications and is currently on diuretics. She does not have any significant history of ascites that require paracentesis. She recently has been having repeated thoracentesis at OSH, last on 6/19/2021 in which 1.1L was removed from the right pleural space. Others on 6/19 and 6/16 with slightly larger volumes removed.     Says she has been doing  well but feels bloated. Saw Dr. Cook recently and did not have much fluid on the lungs. Has not had another thoracentesis since June. Has not had any major changes in the lifestyle. Feels like the bloating starts then the feeling goes to her lungs and it ends up being fluid. Patient feels sick a lot and overall not feeling well. It limits her lifestyle.     Denies leg swelling, encphalopathy. Is taking rifaximin and lactulose titrated to 3-4 BMs/day. Has pruritis but no jaundice. Denies GIBs. Is taking spironolactone.    Review of Systems:    11 point ROS is negative other than above.     Past Medical/Surgical History:    Past Medical History:   Diagnosis Date     Depressive disorder      Diabetes (H)     Type 2 DM/No Insulin      History of blood transfusion     10/2019     Liver cirrhosis secondary to CUETO (H) 05/28/2020     Thyroid disease      Past Surgical History:   Procedure Laterality Date     APPENDECTOMY      1989     COLONOSCOPY      1/2020 at [facility]      ENT SURGERY       GI SURGERY      EGD August 2020 at [facility]      GYN SURGERY      2010     RNY gastric bypass  01/2006    retoocolic, retrogastric, [facility]     VASCULAR SURGERY         Current Medications:    Current Outpatient Medications   Medication Sig Dispense Refill     amylase-lipase-protease (CREON 6) [id number] units CPEP Take 3 capsules by mouth 3 times daily (with meals)       atorvastatin (LIPITOR) 20 MG tablet Take 20 mg by mouth daily        calcium carbonate (TUMS) 500 MG chewable tablet Take 1 tablet by mouth daily as needed for heartburn        calcium citrate-vitamin D (CITRACAL W/D) 250-100 MG-UNIT tablet Take 2 tablets by mouth 2 times daily (with meals) (Patient taking differently: Take 1 tablet by mouth 2 times daily (with meals) ) 336 tablet 3     cholecalciferol (VITAMIN D-1000 MAX ST) 25 MCG (1000 UT) TABS Take 4,000 Units by mouth       cyanocobalamin (VITAMIN B-12) 1000 MCG tablet Take 1,000 mcg by mouth  daily        Ferrous Sulfate (IRON) 325 (65 FE) MG tablet Take 1 tablet by mouth daily       folic acid (FOLVITE) 1 MG tablet Take 1 mg by mouth daily        furosemide (LASIX) 40 MG tablet Take 100 mg by mouth daily       hydrOXYzine (ATARAX) 25 MG tablet Take 25 mg by mouth 3 times daily as needed for anxiety       insulin glargine (LANTUS PEN) 100 UNIT/ML pen Inject 12 Units Subcutaneous At Bedtime       lactulose (CHRONULAC) 10 GM/15ML solution TAKE 30 MLS BY MOUTH 3 TIMES DAILY 3784 mL 5     levothyroxine (SYNTHROID/LEVOTHROID) 175 MCG tablet Take 1 tablet (175 mcg) by mouth daily 30 tablet 0     Multiple Vitamin (MULTIVITAMIN PO) Take 2 tablets by mouth daily       multivitamin (DEKAS ESSENTIAL) capsule Take 1 capsule by mouth daily        omeprazole (PRILOSEC) 20 MG DR capsule Take 1 capsule (20 mg) by mouth daily 30 capsule 0     ondansetron (ZOFRAN-ODT) 4 MG ODT tab Place 4 mg under the tongue every 6 hours as needed for nausea        polyethylene glycol (MIRALAX) 17 g packet Take 1 packet by mouth daily as needed for constipation       rifampin (RIFADIN) 300 MG capsule Take 1 capsule (300 mg) by mouth daily 30 capsule 5     rifaximin (XIFAXAN) 550 MG TABS tablet Take 1 tablet (550 mg) by mouth 2 times daily 60 tablet 11     simethicone (MYLICON) 80 MG chewable tablet Take 80 mg by mouth every 6 hours as needed for flatulence or cramping       sitagliptin (JANUVIA) 100 MG tablet Take 1 tablet (100 mg) by mouth daily       spironolactone (ALDACTONE) 100 MG tablet Take 250 mg by mouth daily       SUMAtriptan (IMITREX) 50 MG tablet Take 50 mg by mouth at onset of headache for migraine        traZODone (DESYREL) 100 MG tablet Take  mg by mouth nightly as needed for sleep       valACYclovir (VALTREX) 1000 mg tablet Take 1,000 mg by mouth daily as needed (at onset of cold sore)        venlafaxine (EFFEXOR) 75 MG tablet Take 1 tablet (75 mg) by mouth At Bedtime 30 tablet 0     vitamin A 3 MG (77723 UNITS)  capsule Take 10,000 Units by mouth daily        vitamin C (ASCORBIC ACID) 100 MG tablet Take 1,000 mg by mouth daily        vitamin D3 (CHOLECALCIFEROL) 50 mcg (2000 units) tablet Take 1 tablet (50 mcg) by mouth daily 90 tablet 3     vitamin E (TOCOPHEROL) 400 units (180 mg) capsule Take 400 Units by mouth daily        Vitamin K, Phytonadione, 100 MCG TABS Take 100 mcg by mouth daily          Allergies:    Prednisone and Nsaids    Family History:    No family history on file.    Social History:    No significant history of alcohol or tobacco use.  Good support structure at home.    Social History     Socioeconomic History     Marital status:      Spouse name: None     Number of children: None     Years of education: None     Highest education level: Bachelor's degree (e.g., BA, AB, BS)   Occupational History     None   Social Needs     Financial resource strain: None     Food insecurity     Worry: Never true     Inability: Never true     Transportation needs     Medical: No     Non-medical: No   Tobacco Use     Smoking status: Never Smoker     Smokeless tobacco: Never Used   Substance and Sexual Activity     Alcohol use: Not Currently     Frequency: Never     Binge frequency: Never     Comment: Last drink was in 2017     Drug use: Never     Sexual activity: None   Lifestyle     Physical activity     Days per week: 1 day     Minutes per session: 10 min     Stress: To some extent   Relationships     Social connections     Talks on phone: More than three times a week     Gets together: Once a week     Attends [employer] service: More than 4 times per year     Active member of club or organization: No     Attends meetings of clubs or organizations: Never     Relationship status:      Intimate partner violence     Fear of current or ex partner: None     Emotionally abused: None     Physically abused: None     Forced sexual activity: None   Other Topics Concern     Parent/sibling w/ CABG, MI or angioplasty  before 65F 55M? Not Asked   Social History Narrative     None       Physical Exam:    There were no vitals taken for this visit.     GENERAL APPEARANCE: healthy, alert and no distress  PSYCHIATRIC: mentation appears normal and affect normal.  NEURO: normal speech and movements  EYES: No jaundice.  SKIN: No jaundice.   RESP: Speaks in full sentences no overt shortness of breath  ABDOMEN: No abdominal distention.   MUSCULOSKELETAL: No edema in the lower extremities.    Laboratory Studies:    I have personally reviewed her labs and calculated the meld score.  Lab Results   Component Value Date    HGB 11.4 03/19/2021     Lab Results   Component Value Date     03/19/2021     Lab Results   Component Value Date    WBC 4.6 03/19/2021       Lab Results   Component Value Date    INR 1.09 03/19/2021       Lab Results   Component Value Date    PROTTOTAL 7.5 03/19/2021      Lab Results   Component Value Date    ALBUMIN 2.8 03/19/2021     Lab Results   Component Value Date    BILITOTAL 0.7 03/19/2021     No results found for: BILICONJ   Lab Results   Component Value Date    ALKPHOS 107 03/19/2021     Lab Results   Component Value Date    AST 45 03/19/2021     Lab Results   Component Value Date    ALT 31 03/19/2021       Lab Results   Component Value Date    CR 1.03 03/19/2021     Lab Results   Component Value Date    BUN 9 03/19/2021       Alpha Fetoprotein   Date Value Ref Range Status   05/18/2021 3.5 0 - 8 ug/L Final     Comment:     Reference ranges apply to non-pregnant females only.  Assay Method:  Chemiluminescence using Siemens Centaur XP         Imaging:     Outside CT of the abdomen and pelvis without contrast 6/16/2021 and CT abdomen pelvis with contrast 2/26/2021 were personally reviewed and interpreted:    6/16/2021: Moderate to large right-sided pleural effusion with associated passive atelectasis of the right lower lobe. Cirrhotic appearing liver without suspicious mass on this noncontrast examination.   Trace ascites.  Splenomegaly.    2021: Patent portal and hepatic veins.  No suspicious mass.    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  Her portal and hepatic veins are patent and there is no liver mass based on CT 2021. The patient's MELD score is 9.  However, it is felt her medical management is adequately controlling her fluid status. She had several large volume right thoracentesis over the course of one week in  but has not had one since. No significant paracentesis or GIB to justify TIPS at this time.     MELD: 9 2021  ECO    PLAN:  -Will watch and wait. Follow up in 3-6 months and re-evaluate patient's need for thoracentesis and calculate up-to-date MELD to make sure she is not getting out of the window of benefit.   -If patient has more voluminous throacentesis or paracentesis, will see sooner and offer TIPS.     Thank you for allowing me to participate in Ms. Puckett's care today.    Alfa Corrales DO on 2021 at 12:28 PM    I, Eddie Storey, was present with the fellow during the history and exam. I discussed the case with the fellow and agree with the findings as documented in the assessment and plan.      Eddie Storey MD    Vascular and Interventional Radiolgy  [facility]    I spent a total of 20 minutes face-to-face with Susan Puckett during today's office visit. Over 50% of this time was spent counseling the patient and/or coordinating care. See note for details.     25 minutes spent on the date of the encounter doing chart review, history and exam, documentation and further activities as noted above          CC  Patient Care Team:  Harleen Billy PA-C as PCP - General  Rivera Dowling MD as MD (Gastroenterology)  Raphael Cook MD as MD (Gastroenterology)  Ozzie Augustin MD as Assigned Behavioral Health Provider  Eli Tobar RN as  Specialty Care Coordinator  Caty Cook MD as Assigned Surgical Provider  CATY COOK    Susan is a 51 year old who is being evaluated via a billable video visit.      How would you like to obtain your AVS? MyChart  If the video visit is dropped, the invitation should be resent by: Text to cell phone: [phone] - [employer]  Will anyone else be joining your video visit? No      Vitals - Patient Reported  Pain Score: No Pain (0)    Vitals were taken and medications reconciled.     Declan Palomares CMA  10:22 AM        Again, thank you for allowing me to participate in the care of your patient.      Sincerely,    Eddie Storey MD

## 2021-07-12 NOTE — PROGRESS NOTES
Susan is a 51 year old who is being evaluated via a billable video visit.      How would you like to obtain your AVS? MyChart  If the video visit is dropped, the invitation should be resent by: Text to cell phone: [phone] - [employer]  Will anyone else be joining your video visit? No      Vitals - Patient Reported  Pain Score: No Pain (0)    Vitals were taken and medications reconciled.     Declan Palomares CMA  10:22 AM

## 2021-07-12 NOTE — PATIENT INSTRUCTIONS
Preventive Care:    Diabetic Eye Exam Screening: During our visit today, we discussed that it is recommended you receive diabetic eye exam screening. Please call or make an appointment with your primary care provider to discuss this with them. You may also call the [facility] scheduling line ([phone]) to set up an eye exam at one of the [facility]ics.

## 2021-07-12 NOTE — PROGRESS NOTES
Susan is a 50 year old who is being evaluated via a billable video visit.      How would you like to obtain your AVS? MyChart  If the video visit is dropped, the invitation should be resent by: Other e-mail: my chart connect  Will anyone else be joining your video visit? No      Video Start Time: 10:41am    Video-Visit Details    Type of service:  Video Visit    Video End Time:10:56am    Originating Location (pt. Location): Home    Distant Location (provider location):  [facility] [place]     Platform used for Video Visit: [facility]             Interventional Radiology Clinic Visit    Date of this visit: 7/12/2021    Susan Puckett  is referred by Dr. Cook for treatment recommendations. 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  Her past medical history is most significant for gastric bypass surgery for obesity, diabetes and Du cirrhosis. Patient referred  For possible TIPS placement for recurrent pleural effusions.      She sees Dr. Cook from hepatology for cirrhosis and portal hypertension complications and is currently on diuretics. She does not have any significant history of ascites that require paracentesis. She recently has been having repeated thoracentesis at OSH, last on 6/19/2021 in which 1.1L was removed from the right pleural space. Others on 6/19 and 6/16 with slightly larger volumes removed.     Says she has been doing well but feels bloated. Saw Dr. Cook recently and did not have much fluid on the lungs. Has not had another thoracentesis since June. Has not had any major changes in the lifestyle. Feels like the bloating starts then the feeling goes to her lungs and it ends up being fluid. Patient feels sick a lot and  overall not feeling well. It limits her lifestyle.     Denies leg swelling, encphalopathy. Is taking rifaximin and lactulose titrated to 3-4 BMs/day. Has pruritis but no jaundice. Denies GIBs. Is taking spironolactone.    Review of Systems:    11 point ROS is negative other than above.     Past Medical/Surgical History:    Past Medical History:   Diagnosis Date     Depressive disorder      Diabetes (H)     Type 2 DM/No Insulin      History of blood transfusion     10/2019     Liver cirrhosis secondary to CUETO (H) 05/28/2020     Thyroid disease      Past Surgical History:   Procedure Laterality Date     APPENDECTOMY      1989     COLONOSCOPY      1/2020 at [facility]      ENT SURGERY       GI SURGERY      EGD August 2020 at [facility]      GYN SURGERY      2010     RNY gastric bypass  01/2006    retoocolic, retrogastric, [facility]     VASCULAR SURGERY         Current Medications:    Current Outpatient Medications   Medication Sig Dispense Refill     amylase-lipase-protease (CREON 6) [id number] units CPEP Take 3 capsules by mouth 3 times daily (with meals)       atorvastatin (LIPITOR) 20 MG tablet Take 20 mg by mouth daily        calcium carbonate (TUMS) 500 MG chewable tablet Take 1 tablet by mouth daily as needed for heartburn        calcium citrate-vitamin D (CITRACAL W/D) 250-100 MG-UNIT tablet Take 2 tablets by mouth 2 times daily (with meals) (Patient taking differently: Take 1 tablet by mouth 2 times daily (with meals) ) 336 tablet 3     cholecalciferol (VITAMIN D-1000 MAX ST) 25 MCG (1000 UT) TABS Take 4,000 Units by mouth       cyanocobalamin (VITAMIN B-12) 1000 MCG tablet Take 1,000 mcg by mouth daily        Ferrous Sulfate (IRON) 325 (65 FE) MG tablet Take 1 tablet by mouth daily       folic acid (FOLVITE) 1 MG tablet Take 1 mg by mouth daily        furosemide (LASIX) 40 MG tablet Take 100 mg by mouth daily       hydrOXYzine (ATARAX) 25 MG tablet Take 25 mg by mouth 3 times daily as needed for  anxiety       insulin glargine (LANTUS PEN) 100 UNIT/ML pen Inject 12 Units Subcutaneous At Bedtime       lactulose (CHRONULAC) 10 GM/15ML solution TAKE 30 MLS BY MOUTH 3 TIMES DAILY 3784 mL 5     levothyroxine (SYNTHROID/LEVOTHROID) 175 MCG tablet Take 1 tablet (175 mcg) by mouth daily 30 tablet 0     Multiple Vitamin (MULTIVITAMIN PO) Take 2 tablets by mouth daily       multivitamin (DEKAS ESSENTIAL) capsule Take 1 capsule by mouth daily        omeprazole (PRILOSEC) 20 MG DR capsule Take 1 capsule (20 mg) by mouth daily 30 capsule 0     ondansetron (ZOFRAN-ODT) 4 MG ODT tab Place 4 mg under the tongue every 6 hours as needed for nausea        polyethylene glycol (MIRALAX) 17 g packet Take 1 packet by mouth daily as needed for constipation       rifampin (RIFADIN) 300 MG capsule Take 1 capsule (300 mg) by mouth daily 30 capsule 5     rifaximin (XIFAXAN) 550 MG TABS tablet Take 1 tablet (550 mg) by mouth 2 times daily 60 tablet 11     simethicone (MYLICON) 80 MG chewable tablet Take 80 mg by mouth every 6 hours as needed for flatulence or cramping       sitagliptin (JANUVIA) 100 MG tablet Take 1 tablet (100 mg) by mouth daily       spironolactone (ALDACTONE) 100 MG tablet Take 250 mg by mouth daily       SUMAtriptan (IMITREX) 50 MG tablet Take 50 mg by mouth at onset of headache for migraine        traZODone (DESYREL) 100 MG tablet Take  mg by mouth nightly as needed for sleep       valACYclovir (VALTREX) 1000 mg tablet Take 1,000 mg by mouth daily as needed (at onset of cold sore)        venlafaxine (EFFEXOR) 75 MG tablet Take 1 tablet (75 mg) by mouth At Bedtime 30 tablet 0     vitamin A 3 MG (47470 UNITS) capsule Take 10,000 Units by mouth daily        vitamin C (ASCORBIC ACID) 100 MG tablet Take 1,000 mg by mouth daily        vitamin D3 (CHOLECALCIFEROL) 50 mcg (2000 units) tablet Take 1 tablet (50 mcg) by mouth daily 90 tablet 3     vitamin E (TOCOPHEROL) 400 units (180 mg) capsule Take 400 Units by mouth  daily        Vitamin K, Phytonadione, 100 MCG TABS Take 100 mcg by mouth daily          Allergies:    Prednisone and Nsaids    Family History:    No family history on file.    Social History:    No significant history of alcohol or tobacco use.  Good support structure at home.    Social History     Socioeconomic History     Marital status:      Spouse name: None     Number of children: None     Years of education: None     Highest education level: Bachelor's degree (e.g., BA, AB, BS)   Occupational History     None   Social Needs     Financial resource strain: None     Food insecurity     Worry: Never true     Inability: Never true     Transportation needs     Medical: No     Non-medical: No   Tobacco Use     Smoking status: Never Smoker     Smokeless tobacco: Never Used   Substance and Sexual Activity     Alcohol use: Not Currently     Frequency: Never     Binge frequency: Never     Comment: Last drink was in 2017     Drug use: Never     Sexual activity: None   Lifestyle     Physical activity     Days per week: 1 day     Minutes per session: 10 min     Stress: To some extent   Relationships     Social connections     Talks on phone: More than three times a week     Gets together: Once a week     Attends Confucianism service: More than 4 times per year     Active member of club or organization: No     Attends meetings of clubs or organizations: Never     Relationship status:      Intimate partner violence     Fear of current or ex partner: None     Emotionally abused: None     Physically abused: None     Forced sexual activity: None   Other Topics Concern     Parent/sibling w/ CABG, MI or angioplasty before 65F 55M? Not Asked   Social History Narrative     None       Physical Exam:    There were no vitals taken for this visit.     GENERAL APPEARANCE: healthy, alert and no distress  PSYCHIATRIC: mentation appears normal and affect normal.  NEURO: normal speech and movements  EYES: No jaundice.  SKIN: No  jaundice.   RESP: Speaks in full sentences no overt shortness of breath  ABDOMEN: No abdominal distention.   MUSCULOSKELETAL: No edema in the lower extremities.    Laboratory Studies:    I have personally reviewed her labs and calculated the meld score.  Lab Results   Component Value Date    HGB 11.4 03/19/2021     Lab Results   Component Value Date     03/19/2021     Lab Results   Component Value Date    WBC 4.6 03/19/2021       Lab Results   Component Value Date    INR 1.09 03/19/2021       Lab Results   Component Value Date    PROTTOTAL 7.5 03/19/2021      Lab Results   Component Value Date    ALBUMIN 2.8 03/19/2021     Lab Results   Component Value Date    BILITOTAL 0.7 03/19/2021     No results found for: BILICONJ   Lab Results   Component Value Date    ALKPHOS 107 03/19/2021     Lab Results   Component Value Date    AST 45 03/19/2021     Lab Results   Component Value Date    ALT 31 03/19/2021       Lab Results   Component Value Date    CR 1.03 03/19/2021     Lab Results   Component Value Date    BUN 9 03/19/2021       Alpha Fetoprotein   Date Value Ref Range Status   05/18/2021 3.5 0 - 8 ug/L Final     Comment:     Reference ranges apply to non-pregnant females only.  Assay Method:  Chemiluminescence using Siemens Centaur XP         Imaging:     Outside CT of the abdomen and pelvis without contrast 6/16/2021 and CT abdomen pelvis with contrast 2/26/2021 were personally reviewed and interpreted:    6/16/2021: Moderate to large right-sided pleural effusion with associated passive atelectasis of the right lower lobe. Cirrhotic appearing liver without suspicious mass on this noncontrast examination.  Trace ascites.  Splenomegaly.    2/26/2021: Patent portal and hepatic veins.  No suspicious mass.    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  Her portal and hepatic veins are patent and there is no liver mass  based on CT 2021. The patient's MELD score is 9.  However, it is felt her medical management is adequately controlling her fluid status. She had several large volume right thoracentesis over the course of one week in  but has not had one since. No significant paracentesis or GIB to justify TIPS at this time.     MELD: 9 2021  ECO    PLAN:  -Will watch and wait. Follow up in 3-6 months and re-evaluate patient's need for thoracentesis and calculate up-to-date MELD to make sure she is not getting out of the window of benefit.   -If patient has more voluminous throacentesis or paracentesis, will see sooner and offer TIPS.     Thank you for allowing me to participate in Ms. Puckett's care today.    Alfa Corrales,  on 2021 at 12:28 PM    I, Eddie Storey, was present with the fellow during the history and exam. I discussed the case with the fellow and agree with the findings as documented in the assessment and plan.      Eddie Storey MD    Vascular and Interventional Radiolgy  [facility]    I spent a total of 20 minutes face-to-face with Susan Puckett during today's office visit. Over 50% of this time was spent counseling the patient and/or coordinating care. See note for details.     25 minutes spent on the date of the encounter doing chart review, history and exam, documentation and further activities as noted above          CC  Patient Care Team:  Harleen Billy PA-C as PCP - General  Rivera Dowling MD as MD (Gastroenterology)  Caty Johnson MD as MD (Gastroenterology)  Ozzie Augustin MD as Assigned Behavioral Health Provider  Eli Tobar RN as Specialty Care Coordinator  Caty Johnson MD as Assigned Surgical Provider  CATY JOHNSON

## 2021-07-16 ENCOUNTER — TELEPHONE (OUTPATIENT)
Dept: TRANSPLANT | Facility: CLINIC | Age: 51
End: 2021-07-16

## 2021-07-19 NOTE — TELEPHONE ENCOUNTER
Received call this morning, she is working with PCP on migraine headaches.  They referred her to neurology for this.  Patient had asked about Excedrin, told her we do not routinely advise aspirin products  for patients with liver disease.

## 2021-07-29 NOTE — PROGRESS NOTES
Pt stated that nausea has been better controlled with new migraine medication and use of zofran as needed. Pt's appetite is still poor but pt has an appointment scheduled with a dietician with endocrinology at [facility]. Pt reported compliance with diuretics and denied, ascites, lower extremity edema, and shortness of breath. Pt has not required a thoracentesis since 6/19. Pt denied experiencing confusion and has been compliant with lactulose and xifaxan. Pt is having at least 3 BMs/day.     Pt has hepatology follow up scheduled on 10/5. Plan to check in with pt in 2-3 weeks. Pt verbalized understanding and is agreeable to plan of care.

## 2021-08-10 ENCOUNTER — PATIENT OUTREACH (OUTPATIENT)
Dept: GASTROENTEROLOGY | Facility: CLINIC | Age: 51
End: 2021-08-10

## 2021-08-10 ENCOUNTER — TRANSFERRED RECORDS (OUTPATIENT)
Dept: HEALTH INFORMATION MANAGEMENT | Facility: CLINIC | Age: 51
End: 2021-08-10

## 2021-08-10 NOTE — PROGRESS NOTES
Pt notified writer that she had thoracentesis at [facility] today and had 1027 ml of fluid removed. Pt started experiencing increased cough and shortness of breath over the weekend and scheduled an outpatient thoracentesis appointment for 8/10. Pt has been compliant with diuretics and is taking furosemide 100 mg daily and spironolactone 250 mg daily. Reiterated the importance of following 2,000 mg sodium restricted diet and pt reported compliance with this. Plan to check in with pt in 2-3 days.

## 2021-08-12 NOTE — ED PROVIDER NOTES
--Continue Astelin nasal spray, 2 puffs in each nostril daily      When using nasal spray you will want to use the right hand to spray the left nostril and the left hand to spray the right nostril. Spraying towards the outer corner of the eye. This will help with aiming to the outer wall of the nose instead of the midline wall.     ED Provider Note  [facility] EMERGENCY DEPARTMENT ([facility])  February 25, 2021      History     Chief Complaint   Patient presents with     Abnormal Labs     HPI  Susan Puckett is a 50 year old female with a past medical history including DM2 w/o complication or long term use of insulin, CUETO and liver cirrhosis secondary to CUETO, pancytopenia, primary hypothyroidism, RNY gastric bypass (2006) who presents to the Emergency Department for evaluation of abnormal liver function labs.     Patient reports that she had her labs drawn today at [facility] and [facility] and was directed to the ED. Per chart review, labs showed iron deficiency and hypocalcemia. Patient also notes that her blood glucose has been abnormally high, measured at 565 earlier today. Says she has been hovering around 285-535 recently. She has been using her prescribed lactulose (per chart review started 12/9/2020), as well as an insulin pen which she says she was prescribed roughly 3 weeks ago and bumped up to 12U today. She notes that her diabetes was controlled but recently has become uncontrolled.    The patient says that she has had difficulty sleeping since around Micheal, only getting 3-4 hours of sleep a night with intermittent ~30 min periods of sleep during the day. Has associated pruritis, as well as bloating from ascites. Endorses fatigue, vomiting and loss of appetite. States that earlier today she had a subjective fever. Her antidepressants and anxiety medication help her fall asleep but do not help her maintain sleep. Denies recent COVID-19 test or symptoms, rash, or UTI.      Past Medical History  Past Medical History:   Diagnosis Date     Depressive disorder      Diabetes (H)     Type 2 DM/No Insulin      History of blood transfusion     10/2019     Liver cirrhosis secondary to CUETO (H) 05/28/2020     Thyroid disease      Past Surgical History:   Procedure  Laterality Date     APPENDECTOMY      1989     COLONOSCOPY      1/2020 at [facility]      ENT SURGERY       GI SURGERY      EGD August 2020 at [facility]      GYN SURGERY      2010     RNY gastric bypass  01/2006    retoocolic, retrogastric, [facility]     VASCULAR SURGERY       atorvastatin (LIPITOR) 20 MG tablet  calcium carbonate (TUMS) 500 MG chewable tablet  calcium citrate-vitamin D (CITRACAL W/D) 250-100 MG-UNIT tablet  cholecalciferol (VITAMIN D-1000 MAX ST) 25 MCG (1000 UT) TABS  cyanocobalamin (VITAMIN B-12) 1000 MCG tablet  Ferrous Sulfate (IRON) 325 (65 FE) MG tablet  folic acid (FOLVITE) 1 MG tablet  furosemide (LASIX) 40 MG tablet  lactulose 20 GM/30ML SOLN  levothyroxine (SYNTHROID/LEVOTHROID) 175 MCG tablet  multivitamin (DEKAS ESSENTIAL) capsule  omeprazole (PRILOSEC) 20 MG DR capsule  ondansetron (ZOFRAN-ODT) 4 MG ODT tab  prochlorperazine (COMPAZINE) 5 MG tablet  rifampin (RIFADIN) 300 MG capsule  rifaximin (XIFAXAN) 550 MG TABS tablet  sitagliptin (JANUVIA) 100 MG tablet  spironolactone (ALDACTONE) 100 MG tablet  SUMAtriptan (IMITREX) 50 MG tablet  traZODone (DESYREL) 100 MG tablet  valACYclovir (VALTREX) 1000 mg tablet  venlafaxine (EFFEXOR) 75 MG tablet  vitamin A 3 MG (10238 UNITS) capsule  vitamin C (ASCORBIC ACID) 100 MG tablet  vitamin D3 (CHOLECALCIFEROL) 50 mcg (2000 units) tablet  vitamin E (TOCOPHEROL) 400 units (180 mg) capsule  Vitamin K, Phytonadione, 100 MCG TABS      Allergies   Allergen Reactions     Prednisone      Nsaids Other (See Comments)     Family History  History reviewed. No pertinent family history.  Social History   Social History     Tobacco Use     Smoking status: Never Smoker     Smokeless tobacco: Never Used   Substance Use Topics     Alcohol use: Not Currently     Frequency: Never     Binge frequency: Never     Comment: Last drink was in 2017     Drug use: Never      Past medical history, past surgical history, medications, allergies, family history, and  "social history were reviewed with the patient. No additional pertinent items.       Review of Systems   Constitutional: Positive for appetite change (loss of appetite), fatigue and fever.   HENT: Negative.    Respiratory: Negative.    Cardiovascular: Negative.    Gastrointestinal: Positive for abdominal distention (bloating from ascites), abdominal pain and vomiting.   Genitourinary: Negative.    Musculoskeletal: Positive for back pain.   Skin: Negative for rash.   Psychiatric/Behavioral: Positive for dysphoric mood and sleep disturbance. The patient is nervous/anxious.    All other systems reviewed and are negative.    A complete review of systems was performed with pertinent positives and negatives noted in the HPI, and all other systems negative.    Physical Exam   BP: 111/80  Pulse: 90  Temp: 98.9  F (37.2  C)  Resp: 18  Height: 167.6 cm (5' 6\")  Weight: 62.3 kg (137 lb 4.8 oz)  SpO2: 97 %  Physical Exam  Vitals signs and nursing note reviewed.   Constitutional:       General: She is not in acute distress.     Appearance: She is well-developed.   HENT:      Head: Normocephalic and atraumatic.      Mouth/Throat:      Mouth: Mucous membranes are moist.   Eyes:      General: No scleral icterus.     Conjunctiva/sclera: Conjunctivae normal.      Pupils: Pupils are equal, round, and reactive to light.   Neck:      Musculoskeletal: Neck supple.   Cardiovascular:      Rate and Rhythm: Normal rate and regular rhythm.      Heart sounds: Normal heart sounds.   Pulmonary:      Effort: Pulmonary effort is normal. No respiratory distress.      Breath sounds: Normal breath sounds. No wheezing.   Abdominal:      General: Abdomen is flat.      Palpations: Abdomen is soft.   Skin:     General: Skin is warm and dry.   Neurological:      General: No focal deficit present.      Mental Status: She is alert and oriented to person, place, and time.      Cranial Nerves: No cranial nerve deficit.   Psychiatric:         Mood and Affect: " Mood normal.         Behavior: Behavior normal.         ED Course     9:34 PM  The patient was seen and examined by Marlon Berry MD in Room ED20.    Procedures        All labs reviewed by myself     Results for orders placed or performed during the hospital encounter of 02/25/21   CT Abdomen Pelvis w Contrast     Status: None    Narrative    EXAM: CT ABDOMEN PELVIS W CONTRAST  LOCATION: [facility]  DATE/TIME: 2/26/2021 1:07 AM    INDICATION: Abdominal pain, acute, nonlocalized.    COMPARISON: Ultrasound 1/19/2021, CT 12/7/2020 and 11/22/2020.    TECHNIQUE: CT scan of the abdomen and pelvis was performed following injection of IV contrast. Multiplanar reformats were obtained. Dose reduction techniques were used.    CONTRAST: 84 mL Isovue 370.    FINDINGS:   LOWER CHEST: 7 mm pleural-based nodule at right hemidiaphragm, stable.    HEPATOBILIARY: Evidence for hepatic cirrhosis as on prior. Nonspecific mild gallbladder wall thickening or edema.    PANCREAS: Fatty replaced as on prior.    SPLEEN: Prominent with perisplenic varices.    ADRENAL GLANDS: Normal.    KIDNEYS/BLADDER: Normal.    BOWEL: Moderate gas and stool throughout the colon without bowel obstruction. No acute inflammatory changes. Stable postoperative changes consistent with gastric bypass.    LYMPH NODES: Stable adenopathy.    VASCULATURE: Varices.    PELVIC ORGANS: Normal.    MUSCULOSKELETAL: Normal.      Impression    IMPRESSION:   1.  Evidence for constipation without bowel obstruction.    2.  Evidence for hepatic cirrhosis as on prior.    3.  Remainder stable. See guidelines below for pulmonary nodule recommendations.    REFERENCE:  Guidelines for Management of Incidental Pulmonary Nodules Detected on CT Images: From the Fleischner Society 2017.   Guidelines apply to incidental nodules in patients who are 35 years or older.  Guidelines do not apply to lung cancer screening, patients with immunosuppression, or patients with known  primary cancer.    SINGLE NODULE  Nodule size <6 mm  Low-risk patients: No follow-up needed.  High-risk patients: Optional follow-up at 12 months.    Nodule size 6-8 mm  Low-risk patients: Follow-up CT at 6-12 months, then consider CT at 18-24 months.  High-risk patients: Follow-up CT at 6-12 months, then at 18-24 months if no change.    Nodule size >8 mm  Either low or high-risk patients:  Consider CT, PET/CT, or tissue sampling at 3 months.    Consider referral to lung nodule clinic.       Ammonia     Status: None   Result Value Ref Range    Ammonia Canceled, Test credited 10 - 50 umol/L   Comprehensive metabolic panel     Status: Abnormal   Result Value Ref Range    Sodium 131 (L) 133 - 144 mmol/L    Potassium 4.1 3.4 - 5.3 mmol/L    Chloride 97 94 - 109 mmol/L    Carbon Dioxide 27 20 - 32 mmol/L    Anion Gap 6 3 - 14 mmol/L    Glucose 565 (HH) 70 - 99 mg/dL    Urea Nitrogen 15 7 - 30 mg/dL    Creatinine 1.07 (H) 0.52 - 1.04 mg/dL    GFR Estimate 61 >60 mL/min/[1.73_m2]    GFR Estimate If Black 71 >60 mL/min/[1.73_m2]    Calcium 8.8 8.5 - 10.1 mg/dL    Bilirubin Total 0.8 0.2 - 1.3 mg/dL    Albumin 3.3 (L) 3.4 - 5.0 g/dL    Protein Total 8.3 6.8 - 8.8 g/dL    Alkaline Phosphatase 132 40 - 150 U/L    ALT 30 0 - 50 U/L    AST 31 0 - 45 U/L   UA with Microscopic     Status: Abnormal   Result Value Ref Range    Color Urine Light Yellow     Appearance Urine Clear     Glucose Urine >1000 (A) NEG^Negative mg/dL    Bilirubin Urine Negative NEG^Negative    Ketones Urine Negative NEG^Negative mg/dL    Specific Gravity Urine 1.029 1.003 - 1.035    Blood Urine Negative NEG^Negative    pH Urine 6.0 5.0 - 7.0 pH    Protein Albumin Urine Negative NEG^Negative mg/dL    Urobilinogen mg/dL Normal 0.0 - 2.0 mg/dL    Nitrite Urine Negative NEG^Negative    Leukocyte Esterase Urine Negative NEG^Negative    Source Midstream Urine     WBC Urine 3 0 - 5 /HPF    RBC Urine 1 0 - 2 /HPF    Squamous Epithelial /HPF Urine 1 0 - 1 /HPF     Mucous Urine Present (A) NEG^Negative /LPF   Asymptomatic SARS-CoV-2 COVID-19 Virus (Coronavirus) by PCR     Status: None    Specimen: Nasopharyngeal   Result Value Ref Range    SARS-CoV-2 Virus Specimen Source Nasopharyngeal     SARS-CoV-2 PCR Result NEGATIVE     SARS-CoV-2 PCR Comment       Testing was performed using the Xpert Xpress SARS-CoV-2 Assay on the Cepheid Gene-Xpert   Instrument Systems. Additional information about this Emergency Use Authorization (EUA)   assay can be found via the Lab Guide.     Ketone Beta-Hydroxybutyrate Quantitative     Status: None   Result Value Ref Range    Ketone Quantitative 0.2 0.0 - 0.6 mmol/L   CBC with platelets differential     Status: Abnormal   Result Value Ref Range    WBC 4.9 4.0 - 11.0 10e9/L    RBC Count 3.06 (L) 3.8 - 5.2 10e12/L    Hemoglobin 10.1 (L) 11.7 - 15.7 g/dL    Hematocrit 29.6 (L) 35.0 - 47.0 %    MCV 97 78 - 100 fl    MCH 33.0 26.5 - 33.0 pg    MCHC 34.1 31.5 - 36.5 g/dL    RDW 14.1 10.0 - 15.0 %    Platelet Count 77 (L) 150 - 450 10e9/L    Diff Method Automated Method     % Neutrophils 66.1 %    % Lymphocytes 24.2 %    % Monocytes 7.7 %    % Eosinophils 1.4 %    % Basophils 0.2 %    % Immature Granulocytes 0.4 %    Nucleated RBCs 0 0 /100    Absolute Neutrophil 3.2 1.6 - 8.3 10e9/L    Absolute Lymphocytes 1.2 0.8 - 5.3 10e9/L    Absolute Monocytes 0.4 0.0 - 1.3 10e9/L    Absolute Eosinophils 0.1 0.0 - 0.7 10e9/L    Absolute Basophils 0.0 0.0 - 0.2 10e9/L    Abs Immature Granulocytes 0.0 0 - 0.4 10e9/L    Absolute Nucleated RBC 0.0    Glucose by meter     Status: Abnormal   Result Value Ref Range    Glucose 330 (H) 70 - 99 mg/dL   Ammonia (on ice)     Status: Abnormal   Result Value Ref Range    Ammonia 60 (H) 10 - 50 umol/L     Medications   ondansetron (ZOFRAN-ODT) ODT tab 4 mg (4 mg Oral Given 2/25/21 2027)   0.9% sodium chloride BOLUS (0 mLs Intravenous Stopped 2/25/21 2353)   insulin aspart (NovoLOG) injection (RAPID ACTING) (6 Units Subcutaneous  Given 2/26/21 0015)   iopamidol (ISOVUE-370) solution 85 mL (85 mLs Intravenous Given 2/26/21 0116)   sodium chloride 0.9 % bag 500mL for CT scan flush use (71 mLs Intravenous Given 2/26/21 0116)   lactulose (CHRONULAC) solution 20 g (20 g Oral Given 2/26/21 0253)        Assessments & Plan (with Medical Decision Making)   50-year-old female who is an insulin-dependent diabetic and has cirrhosis secondary to Du presents complaining of fatigue.  She was mildly hyperglycemic but not in DKA she was also mildly dehydrated she is not confused her ammonia is a bit elevated at 60.  I did not find source of infection her Covid was negative.  I recommended an extra dose of lactulose daily for the next week and also to follow-up with her primary care provider for better blood sugar control.  I do not think she needs to be hospitalized.  Her LFTs are not unusually elevated she does not have a white count or fever.  Her blood sugar was elevated and she was given fluids and insulin with improvement.  None by vic under select goserelin looking for trouble    I have reviewed the nursing notes. I have reviewed the findings, diagnosis, plan and need for follow up with the patient.    Discharge Medication List as of 2/26/2021  3:00 AM          Final diagnoses:   Liver cirrhosis secondary to DU (H)   Hyperglycemia   Generalized pruritus     I, Leeann Deshpande, am serving as a trained medical scribe to document services personally performed by Marlon Berry MD, based on the provider's statements to me.   Marlon OLIVAS MD, was physically present and have reviewed and verified the accuracy of this note documented by Leeann Deshpande.    --    [facility] EMERGENCY DEPARTMENT  2/25/2021     Marlon Berry MD  03/07/21 5448

## 2021-08-13 ENCOUNTER — TRANSFERRED RECORDS (OUTPATIENT)
Dept: HEALTH INFORMATION MANAGEMENT | Facility: CLINIC | Age: 51
End: 2021-08-13

## 2021-08-15 ENCOUNTER — TELEPHONE (OUTPATIENT)
Dept: TRANSPLANT | Facility: CLINIC | Age: 51
End: 2021-08-15

## 2021-08-15 NOTE — TELEPHONE ENCOUNTER
"Susan, patient, paged that she had thora on tues and Friday and wondering if she needs to have another one.  Pt still coughing at times. Pt is very anxious and crying.   Informed patient that we would have to do imaging and it would be best and was was not finished with sentence and she stated \"thanks I got to go.\" and hung up the phone.   "

## 2021-08-16 ENCOUNTER — VIRTUAL VISIT (OUTPATIENT)
Dept: VASCULAR SURGERY | Facility: CLINIC | Age: 51
End: 2021-08-16
Payer: COMMERCIAL

## 2021-08-16 ENCOUNTER — HOSPITAL ENCOUNTER (OUTPATIENT)
Facility: CLINIC | Age: 51
End: 2021-08-16
Payer: COMMERCIAL

## 2021-08-16 DIAGNOSIS — K76.9 PLEURAL EFFUSION ASSOCIATED WITH HEPATIC DISORDER: ICD-10-CM

## 2021-08-16 DIAGNOSIS — K75.81 NASH (NONALCOHOLIC STEATOHEPATITIS): Primary | ICD-10-CM

## 2021-08-16 DIAGNOSIS — J91.8 PLEURAL EFFUSION ASSOCIATED WITH HEPATIC DISORDER: ICD-10-CM

## 2021-08-16 PROCEDURE — 99214 OFFICE O/P EST MOD 30 MIN: CPT | Mod: 95 | Performed by: RADIOLOGY

## 2021-08-16 NOTE — PROGRESS NOTES
"  Chief Complaint   Patient presents with     RECHECK     Return patient for discussion on TIPS. Patient c/o feeling very bloated, \"someone sitting on chest\", fluid in chest, no pain.      Vitals were taken and medications reconciled.     Declan Palomares CMA  8:04 AM      Susan is a 51 year old who is being evaluated via a billable video visit.      How would you like to obtain your AVS? MyChart  Will anyone else be joining your video visit? No    Video Start Time: 0805     Assessment & Plan   Problem List Items Addressed This Visit     Pleural effusion associated with hepatic disorder    Relevant Orders    IR Transven Intrahepatic Portosyst Shunt    IR Thoracentesis    INR (Completed)    Basic metabolic panel (Completed)    CBC with platelets (Completed)    Hepatic panel (Completed)    CUETO (nonalcoholic steatohepatitis) - Primary    Relevant Orders    IR Transven Intrahepatic Portosyst Shunt    IR Thoracentesis    INR (Completed)    Basic metabolic panel (Completed)    CBC with platelets (Completed)    Hepatic panel (Completed)         - PACS visit.  - Echo.or stress test? Last done in Oct 2020. No need to repeat.  - Update LFT labs  - TIPS with thora under GA the first available slot    Eddie Storey MD  [facility] [place]     BRENDON spent a total of 29 minutes face-to-face with Susan MARIO Puckett during today's video visit. Over 50% of this time was spent counseling the patient and/or coordinating care. See note for details.     An additional 20 minutes spent on the date of the encounter doing chart review, discussion with other providers, history, documentation and further activities as noted above      Subjective   Susan is a 51 year old who presents for the following health issues diuretic refractory hepatic hydrothorax and CUETO cirrhosis.    HPI   Ms. Berumen is a very pleasant 51-year-old female presenting with diuretic refractory hepatic hydrothorax and cirrhosis most likely related to nonalcoholic fatty " liver disease.  Her past medical history is most significant for gastric bypass surgery for obesity and diabetes.  She was initially assessed in my office in April and subsequently in July when the concluded and observant management might be more preferable given the fact that her thoracentesis frequency was decreasing.  However since our last visit she has needed multiple thoracenteses that are quite lifestyle limiting for her.  She has needed within the last 2 weeks twice weekly paracenteses with more than 1000 mL of pleural fluid aspirated.  She describes to me that she feels completely wiped out for 2-3 days after each of those tabs.  She also reports shortness of breath and lots of coughing as she approaches the time that she needs the next thoracentesis.  Also of note she required a large volume tap during her hospitalization in June of this year in [facility].  She does not report abdominal discomfort or pain, but shares with me that her abdomen feels hard and tense.  She complains of moderate fatigue.  She denies having any jaundice.  She does have some mild itching without any skin rashes.  She has never had any episodes of overt gastrointestinal bleeding.  She has a history of hepatic encephalopathy in the past for which she has been admitted, however since she has been on lactulose and Xifaxan she has 2-5 bowel movements per day and no encephalopathy.  She does not have any nausea, vomiting or GI bleeding.  No leg swelling.  No shortness of breath.  No history of heart failure or pulmonary arterial hypertension.      MELD-Na score: 9 at 7/5/2021 11:08 AM  MELD score: 9 at 7/5/2021 11:08 AM  Calculated from:  Serum Creatinine: 1.10 mg/dL at 7/5/2021 11:08 AM  Serum Sodium: 141 mmol/L (Using max of 137 mmol/L) at 7/5/2021 11:08 AM  Total Bilirubin: 0.5 mg/dL (Using min of 1 mg/dL) at 7/5/2021 11:08 AM  INR(ratio): 1.18 at 7/5/2021 11:08 AM  Age: 50 years        Review of Systems   An 11 point review  of system was performed and pertinent negative and positives are mentioned in HPI.        Objective           Vitals:  No vitals were obtained today due to virtual visit.    Physical Exam   GENERAL: Healthy, alert and no distress  EYES: Eyes grossly normal to inspection.  No discharge or erythema, or obvious scleral/conjunctival abnormalities.  RESP: No audible wheeze, cough, or visible cyanosis.  No visible retractions or increased work of breathing.    SKIN: Visible skin clear. No significant rash, abnormal pigmentation or lesions.  NEURO: Cranial nerves grossly intact.  Mentation and speech appropriate for age.  PSYCH: Mentation appears normal, affect normal/bright, judgement and insight intact, normal speech and appearance well-groomed.    Her most recent lab results and imaging were reviewed.  Her anatomy is amenable to TIPS placement.  Her meld score is not prohibitive for TIPS placement, so as her coagulation profile.      Video-Visit Details    Type of service:  Video Visit    Video End Time:8:34 AM    Originating Location (pt. Location): Home    Distant Location (provider location):  [facility] [place]     Platform used for Video Visit: Holly Renteria

## 2021-08-16 NOTE — PATIENT INSTRUCTIONS
Susan you have had your virtual follow up today with Dr Storey IR/Vascular to discuss TIPS procedure.    Plan:    We will schedule you for your TIPS procedure and contact you with appointment details    You will need a PACs appointment within 30 days before the procedure.    Please don't hesitate to call with questions or concern,     MARY ANN Philippe RN, BSN  Interventional Radiology Nurse Coordinator   Phone:  [phone]

## 2021-08-16 NOTE — LETTER
"8/16/2021       RE: Susan Puckett  [address] [facility] [address]     Dear Colleague,    Thank you for referring your patient, Susan Puckett, to the [facility] [place] at [facility]. Please see a copy of my visit note below.      Chief Complaint   Patient presents with     RECHECK     Return patient for discussion on TIPS. Patient c/o feeling very bloated, \"someone sitting on chest\", fluid in chest, no pain.      Vitals were taken and medications reconciled.     Declan Palomares CMA  8:04 AM     Assessment & Plan   Problem List Items Addressed This Visit     Pleural effusion associated with hepatic disorder    Relevant Orders    IR Transven Intrahepatic Portosyst Shunt    IR Thoracentesis    INR (Completed)    Basic metabolic panel (Completed)    CBC with platelets (Completed)    Hepatic panel (Completed)    CUETO (nonalcoholic steatohepatitis) - Primary    Relevant Orders    IR Transven Intrahepatic Portosyst Shunt    IR Thoracentesis    INR (Completed)    Basic metabolic panel (Completed)    CBC with platelets (Completed)    Hepatic panel (Completed)         - PACS visit.  - Echo.or stress test? Last done in Oct 2020. No need to repeat.  - Update LFT labs  - TIPS with thora under GA the first available slot    Eddie Storey MD  [facility] [place]     I spent a total of 29 minutes face-to-face with Susan Puckett during today's video visit. Over 50% of this time was spent counseling the patient and/or coordinating care. See note for details.     An additional 20 minutes spent on the date of the encounter doing chart review, discussion with other providers, history, documentation and further activities as noted above      Subjective   Susan is a 51 year old who presents for the following health issues diuretic refractory hepatic hydrothorax and CUETO cirrhosis.    HPI   Ms. Berumen is a very pleasant 51-year-old female " presenting with diuretic refractory hepatic hydrothorax and cirrhosis most likely related to nonalcoholic fatty liver disease.  Her past medical history is most significant for gastric bypass surgery for obesity and diabetes.  She was initially assessed in my office in April and subsequently in July when the concluded and observant management might be more preferable given the fact that her thoracentesis frequency was decreasing.  However since our last visit she has needed multiple thoracenteses that are quite lifestyle limiting for her.  She has needed within the last 2 weeks twice weekly paracenteses with more than 1000 mL of pleural fluid aspirated.  She describes to me that she feels completely wiped out for 2-3 days after each of those tabs.  She also reports shortness of breath and lots of coughing as she approaches the time that she needs the next thoracentesis.  Also of note she required a large volume tap during her hospitalization in June of this year in [facility].  She does not report abdominal discomfort or pain, but shares with me that her abdomen feels hard and tense.  She complains of moderate fatigue.  She denies having any jaundice.  She does have some mild itching without any skin rashes.  She has never had any episodes of overt gastrointestinal bleeding.  She has a history of hepatic encephalopathy in the past for which she has been admitted, however since she has been on lactulose and Xifaxan she has 2-5 bowel movements per day and no encephalopathy.  She does not have any nausea, vomiting or GI bleeding.  No leg swelling.  No shortness of breath.  No history of heart failure or pulmonary arterial hypertension.      MELD-Na score: 9 at 7/5/2021 11:08 AM  MELD score: 9 at 7/5/2021 11:08 AM  Calculated from:  Serum Creatinine: 1.10 mg/dL at 7/5/2021 11:08 AM  Serum Sodium: 141 mmol/L (Using max of 137 mmol/L) at 7/5/2021 11:08 AM  Total Bilirubin: 0.5 mg/dL (Using min of 1 mg/dL) at  7/5/2021 11:08 AM  INR(ratio): 1.18 at 7/5/2021 11:08 AM  Age: 50 years        Review of Systems   An 11 point review of system was performed and pertinent negative and positives are mentioned in HPI.        Objective           Vitals:  No vitals were obtained today due to virtual visit.    Physical Exam   GENERAL: Healthy, alert and no distress  EYES: Eyes grossly normal to inspection.  No discharge or erythema, or obvious scleral/conjunctival abnormalities.  RESP: No audible wheeze, cough, or visible cyanosis.  No visible retractions or increased work of breathing.    SKIN: Visible skin clear. No significant rash, abnormal pigmentation or lesions.  NEURO: Cranial nerves grossly intact.  Mentation and speech appropriate for age.  PSYCH: Mentation appears normal, affect normal/bright, judgement and insight intact, normal speech and appearance well-groomed.    Her most recent lab results and imaging were reviewed.  Her anatomy is amenable to TIPS placement.  Her meld score is not prohibitive for TIPS placement, so as her coagulation profile.        Again, thank you for allowing me to participate in the care of your patient.      Sincerely,    Eddie Storey MD

## 2021-08-17 NOTE — PROGRESS NOTES
Pt had thoracentesis again on 8/13 and had 1025 ml of fluid removed. Pt met with Dr. Storey on 8/16 and has TIPS scheduled for 9/28. Pt had another thoracentesis appointment today, 8/17, but no pleural effusion found on imaging. Pt called and expressed frustration with continued shortness of breath and non productive cough. Pt denied fevers, chills, and chest pain. Pt stated that shortness of breath occurs mainly at night while trying to sleep. Discussed pt's mental health and whether shortness of breath could be anxiety related. Pt stated that she feels depression and anxiety are not currently controlled and plans to reach out to PCP and behavioral health. Advised pt to schedule another thoracentesis appointment if shortness of breath and/or cough worsens. Plan to check in with pt in 2-3 days. Pt verbalized understanding and is agreeable to plan of care.

## 2021-08-18 NOTE — TELEPHONE ENCOUNTER
FUTURE VISIT INFORMATION      SURGERY INFORMATION:    Date: 2021    Location:[facility] OR    Surgeon:  TBD    Anesthesia Type:  General    Procedure: Recurrent Thoracentesis, Transjugular intrahepatic portosystemic shunt (TIPS)     Consult: N/A - unsure who surgeon is     RECORDS REQUESTED FROM:       Primary Care Provider: Harleen Billy PA-C ([facility]s - Care Everywhere)    Pertinent Medical History: Liver cirrhosis secondary to CUETO (H)    Most recent EKG+ Tracin2021 ([facility])     Most recent ECHO: 2020 (Epic), 2020 ([facility])    Most recent Cardiac Stress Test: 10/20/2020    Records Requested  21    Facility  [facility]  Tel [phone]  Fax [phone]   Outcome Sent a fax for EKG    2021 11:25AM received EKG tracings, sent to scan - Amay

## 2021-08-20 NOTE — PROGRESS NOTES
Pt notified writer that she presented to the ED on 8/19 for increased shortness of breath, cough, and abdominal pain. Pt also stated that she had a fever of 102.1 at home prior to presenting the emergency room. Per chart review, pt's workup was reassuring given pt's chronic illness. Pt had CT of abdomen and pelvis and was recommended to have hepatologist review CT and offer more specific long term pain and symptom management. Imaging showed no drainable pleural effusion and no ascites. Pt was discharged with a prescription for oxycodone.     Pt denied experiencing fevers and chills since discharge. Pt stated that she still has abdominal discomfort and distension. Pt reported compliance with lactulose (ED note stated that pt reported partial compliance with lactulose) and is having at least 3 BMs. Discussed whether abdominal discomfort and distension could be from gas and reviewed that lactulose can cause bloating and gas distension. Pt will plan to substitute one dose of lactulose with miralax to see if this improves symptoms. Advised pt to take oxycodone only as needed and educated pt that pain medication can cause constipation.     Will notify Dr. Cook of pt's recent ED visit. Plan to check in with pt in 1 week. Pt verbalized understanding and is agreeable to plan of care.

## 2021-08-25 NOTE — TELEPHONE ENCOUNTER
FUTURE VISIT INFORMATION        SURGERY INFORMATION:    Date: 2021    Location:[facility] OR    Surgeon:  TBD    Anesthesia Type:  General    Procedure: Recurrent Thoracentesis, Transjugular intrahepatic portosystemic shunt (TIPS)     Consult: N/A - unsure who surgeon is      RECORDS REQUESTED FROM:         Primary Care Provider: Harleen Billy PA-C ([facility] - Care Everywhere)     Pertinent Medical History: Liver cirrhosis secondary to CUETO (H)     Most recent EKG+ Tracin2021 ([facility])      Most recent ECHO: 2020 (Epic), 2020 ([facility])     Most recent Cardiac Stress Test: 10/20/2020     Records Requested  21     Facility  [facility]  Tel [phone]  Fax [phone]   Outcome Sent a fax for EKG    2021 11:25AM received EKG tracings, sent to scan - Amay

## 2021-08-26 DIAGNOSIS — K75.81 LIVER CIRRHOSIS SECONDARY TO NASH (H): Primary | ICD-10-CM

## 2021-08-26 DIAGNOSIS — K74.60 LIVER CIRRHOSIS SECONDARY TO NASH (H): Primary | ICD-10-CM

## 2021-08-26 DIAGNOSIS — L29.9 PRURITIC DISORDER: ICD-10-CM

## 2021-08-26 RX ORDER — SPIRONOLACTONE 100 MG/1
250 TABLET, FILM COATED ORAL DAILY
Qty: 75 TABLET | Refills: 11 | Status: ON HOLD | OUTPATIENT
Start: 2021-08-26 | End: 2021-12-09

## 2021-08-26 RX ORDER — RIFAMPIN 300 MG/1
CAPSULE ORAL
Qty: 90 CAPSULE | Refills: 3 | Status: ON HOLD | OUTPATIENT
Start: 2021-08-26 | End: 2022-02-15

## 2021-08-28 ENCOUNTER — HEALTH MAINTENANCE LETTER (OUTPATIENT)
Age: 51
End: 2021-08-28

## 2021-08-30 ENCOUNTER — TELEPHONE (OUTPATIENT)
Dept: GASTROENTEROLOGY | Facility: CLINIC | Age: 51
End: 2021-08-30

## 2021-08-30 NOTE — TELEPHONE ENCOUNTER
Prior Authorization Specialty Medication Request    Medication/Dose: Xifaxan 550 mg tablets  ICD code (if different than what is on RX): K72.90  Previously Tried and Failed:  Lactulose alone    Important Lab Values:   Rationale: Xifaxan helps to lower the toxin build up in the blood while lactulose works by cleansing the toxin build up in the liver. Adjunctive therapy: http://onlinelibrary.darden.com/doi/10.1111/fuad.35982/full       Insurance Name:   Insurance ID:   Insurance Phone Number:     Pharmacy Information (if different than what is on RX)  Name:    Phone:

## 2021-08-30 NOTE — PROGRESS NOTES
Pt reported increased chest pressure, shortness of breath, and cough. Pt has a thoracentesis scheduled tomorrow, 8/31, at [facility]. Pt stated that she continues to have abdominal distension and intermittent nausea. Pt tried substituting a dose of miralax for lactulose to see if this would help with gas discomfort and pt did not notice much improvement. Pt is having 3 loose BMs/day. Discussed that pt's symptoms could be due to constipation and that pt could increase lactulose or miralax dosing. Also instructed pt to stay hydrated. Pt verbalized understanding and agreeable to plan of care.

## 2021-08-30 NOTE — TELEPHONE ENCOUNTER
Central Prior Authorization Team   Phone: [phone]    PA Initiation    Medication: Xifaxan 550 mg tablets  Insurance Company: [employer] - Phone [phone] Fax [phone]  Pharmacy Filling the Rx: [id number] [facility] [place] - [address] [facility]  Filling Pharmacy Phone: [phone]  Filling Pharmacy Fax: [phone]  Start Date: 8/30/2021

## 2021-08-31 DIAGNOSIS — Z11.59 ENCOUNTER FOR SCREENING FOR OTHER VIRAL DISEASES: ICD-10-CM

## 2021-08-31 NOTE — TELEPHONE ENCOUNTER
Prior Authorization Approval          Authorization Effective Date: 7/31/2021  Authorization Expiration Date: 2/26/2022  Medication: Xifaxan 550 mg tablets-PA APPROVED   Approved Dose/Quantity:   Reference #:     Insurance Company: [employer] - Phone [phone] Fax [phone]  Expected CoPay:       CoPay Card Available:      Foundation Assistance Needed:    Which Pharmacy is filling the prescription (Not needed for infusion/clinic administered): [facility] [id number] [place] [address]  Pharmacy Notified: Yes- **Instructed pharmacy to notify patient when script is ready to /ship.**   Patient Notified: Yes

## 2021-09-01 ENCOUNTER — ANESTHESIA EVENT (OUTPATIENT)
Dept: SURGERY | Facility: CLINIC | Age: 51
End: 2021-09-01

## 2021-09-01 ENCOUNTER — VIRTUAL VISIT (OUTPATIENT)
Dept: SURGERY | Facility: CLINIC | Age: 51
End: 2021-09-01
Payer: COMMERCIAL

## 2021-09-01 ENCOUNTER — PRE VISIT (OUTPATIENT)
Dept: SURGERY | Facility: CLINIC | Age: 51
End: 2021-09-01

## 2021-09-01 DIAGNOSIS — Z01.818 PRE-OP EXAMINATION: Primary | ICD-10-CM

## 2021-09-01 DIAGNOSIS — K75.81 NASH (NONALCOHOLIC STEATOHEPATITIS): ICD-10-CM

## 2021-09-01 PROCEDURE — 99204 OFFICE O/P NEW MOD 45 MIN: CPT | Mod: 95 | Performed by: PHYSICIAN ASSISTANT

## 2021-09-01 ASSESSMENT — ENCOUNTER SYMPTOMS: SEIZURES: 1

## 2021-09-01 ASSESSMENT — LIFESTYLE VARIABLES: TOBACCO_USE: 0

## 2021-09-01 ASSESSMENT — PAIN SCALES - GENERAL: PAINLEVEL: NO PAIN (0)

## 2021-09-01 NOTE — ANESTHESIA PREPROCEDURE EVALUATION
Anesthesia Pre-Procedure Evaluation    Patient: Susan Puckett   MRN: [id number] : 1970        Preoperative Diagnosis: * No surgery found *   Procedure :      Past Medical History:   Diagnosis Date     Depressive disorder      Diabetes (H)     Type 2 DM/No Insulin      History of blood transfusion     10/2019     Liver cirrhosis secondary to CUETO (H) 2020     Thyroid disease       Past Surgical History:   Procedure Laterality Date     APPENDECTOMY           COLONOSCOPY      2020 at [facility]      ENT SURGERY       GI SURGERY      EGD 2020 at [facility]      GYN SURGERY      2010     RNY gastric bypass  2006    retoocolic, retrogastric, [facility]     VASCULAR SURGERY        Allergies   Allergen Reactions     Prednisone      Droperidol Anxiety     Nsaids Other (See Comments)      Social History     Tobacco Use     Smoking status: Never Smoker     Smokeless tobacco: Never Used   Substance Use Topics     Alcohol use: Not Currently     Comment: Last drink was in       Wt Readings from Last 1 Encounters:   21 71.9 kg (158 lb 9.6 oz)        Anesthesia Evaluation   Pt has had prior anesthetic. Type: General and Regional.    No history of anesthetic complications       ROS/MED HX  ENT/Pulmonary: Comment: Recurrent pleural effusion needing thoracentesis - last on 21.    (-) tobacco use   Neurologic: Comment: History of encephalopathy     (+) migraines, seizures, features: history of diabetic seizure,  (-) no CVA and no TIA   Cardiovascular:     (+) Dyslipidemia -----Previous cardiac testing   Echo: Date: 20 Results:  CONCLUSIONS   Normal left ventricular size and global and regional function.   Left ventricular ejection fraction is visually estimated at 60%.   No significant valvular abnormalities were identified.   The inferior vena cava is normal suggesting normal RA pressure.   Pulmonary artery systolic pressure estimate is 22mmHg above RAP.   (Normal)     No prior  study for comparison.     Stress Test: Date: Results:    ECG Reviewed: Date: 6/16/21 Results:  Sinus rhythm   Minimal voltage criteria for LVH, may be normal variant   Cannot rule out Anterior infarct (cited on or before 16-JUN-2021)   Abnormal ECG     Cath: Date: Results:      METS/Exercise Tolerance: 4 - Raking leaves, gardening    Hematologic: Comments: Thrombocytopenia     (+) anemia, history of blood transfusion, no previous transfusion reaction, Known PRBC Anitbodies:No  (-) history of blood clots   Musculoskeletal:  - neg musculoskeletal ROS     GI/Hepatic: Comment: CUETO - patient has RUQ pain and itching     S/p gastric bypass    Grade I varices    History of paracentesis x1    (+) liver disease,  (-) GERD   Renal/Genitourinary:  - neg Renal ROS  (-) renal disease   Endo:     (+) type II DM, Last HgA1c: 6.9, date: 6/17/21, thyroid problem, hypothyroidism,     Psychiatric/Substance Use:     (+) psychiatric history anxiety, depression and other (comment) (insomnia )     Infectious Disease: Comment: Cold sore - currently has one on bottom lip      Malignancy:  - neg malignancy ROS     Other:  - neg other ROS             OUTSIDE LABS:  CBC:   Lab Results   Component Value Date    WBC 4.0 07/05/2021    WBC 3.3 (L) 05/18/2021    HGB 11.5 (L) 07/05/2021    HGB 11.3 (L) 05/18/2021    HCT 34.4 (L) 07/05/2021    HCT 35.0 05/18/2021     (L) 07/05/2021     (L) 05/18/2021     BMP:   Lab Results   Component Value Date     07/05/2021     05/18/2021    POTASSIUM 3.0 (L) 07/05/2021    POTASSIUM 3.8 05/18/2021    CHLORIDE 107 07/05/2021    CHLORIDE 105 05/18/2021    CO2 29 07/05/2021    CO2 32 05/18/2021    BUN 11 07/05/2021    BUN 11 05/18/2021    CR 1.10 (H) 07/05/2021    CR 0.94 05/18/2021     (H) 07/05/2021    GLC 61 (L) 05/18/2021     COAGS:   Lab Results   Component Value Date    PTT 32 11/22/2020    INR 1.18 (H) 07/05/2021     POC:   Lab Results   Component Value Date     (H)  02/25/2021    HCG Negative 10/25/2020    HCGS Negative 11/22/2020     HEPATIC:   Lab Results   Component Value Date    ALBUMIN 2.6 (L) 07/05/2021    PROTTOTAL 6.9 07/05/2021    ALT 24 07/05/2021    AST 33 07/05/2021    ALKPHOS 133 07/05/2021    BILITOTAL 0.5 07/05/2021    JOVANNY 60 (H) 02/25/2021     OTHER:   Lab Results   Component Value Date    LACT 2.5 (H) 11/22/2020    MAURI 7.9 (L) 07/05/2021    PHOS 3.0 12/08/2020    MAG 1.8 12/08/2020    LIPASE 14 (L) 12/06/2020    TSH 0.08 (L) 11/22/2020    T4 1.28 11/22/2020    CRP 3.7 11/22/2020             PAC Discussion and Assessment    ASA Classification: 3  Case is suitable for: [place]  Anesthetic techniques and relevant risks discussed: GA                  PAC Resident/NP Anesthesia Assessment: Susan is a 51 year old woman who is scheduled for ANESTHESIA OUT OF OR Recurrent Thoracentesis, Transjugular intrahepatic portosystemic shunt (TIPS) Procedure @0800 on 9/28/21 by Dr. Storey in treatment of CUETO (nonalcoholic steatohepatitis).  PAC referral for risk assessment and optimization for anesthesia with comorbid conditions of recurrent pleural effusions and chronic anemia and thrombocytopenia, migraines, encephalopathy, type 2 diabetes on insulin, hypothyroidism, status post gastric bypass, anxiety depression and insomnia :      Pre-operative considerations:   1.  Cardiac:  Functional status- METS 4, the patient is able to garden and do house work. She can walk a block and go up a flight of stairs. She denies any cardiac symptoms.  Low risk surgery with 0.9% (RCRI #) risk of major adverse cardiac event.    ~ The patient had EKG on 6/16/21 and echo on 7/31/20. She is on lipitor but no other cardiac risk factors or diagnosis. No further cardiac testing indicated.     2.  Pulm:  Airway feasible.  LEONEL risk: Low  ~ Recurrent pleural effusion - last thoracentesis on 8/30/21. She reports today she is sore and has some coughing but denies any URI symptoms or fevers or chills.      3. Heme:   Anemia/ thrombocytopenia - hgb 11.1 and platelets 105 on 8/19/21. Hold iron DOS. The patient has had a transfusion before in 2019. Will get T/S for surgery.     4. Neuro:  migraine - not on any specific medications  ~ history of encephalopathy - continue lactulose  ~ History of diabetic seizure     5. Endo: type 2 diabetes - hgb A1c 6.9 on 6/17/21. Patient has Dexcom G6. She will take 80% glargine and hold januvia DOS  ~ hypothyroidism - continue levothyroxine.     6. GI:  Risk of PONV score = 3.  If > 2, anti-emetic intervention recommended.   ~ CUETO - patient has RUQ pain and itching - followed by Dr. Cook and last seen on 7/7/21. She has only needed paracentesis once. She will continue simethicone, rifampin, rifaximin, and atarax. Hold Lasix, Aldactone and creon DOS. She last had EGD on 4/1/21 at which time non bleeding grade I varices seen.   ~ GERD - continue Prilosec and TUMS.   ~ Patient is s/p gastric bypass    7. ID: Cold sores - the patient currently has one on her bottom lip. Continue valtrex.     8. Psych: anxiety, depression, insomnia - continue trazodone and effexor. The patient follows with a therapist and feels she has a good support system.        VTE risk: 0.26%    **Please refer to the physical examination documented by the anesthesiologist in the anesthesia record on the day of surgery**       Patient is optimized and is acceptable candidate for the proposed procedure.  No further diagnostic evaluation is needed. The patient will complete labs on 9/3/21.    For further details of assessment, testing, and physical exam please see H and P completed on same date   Steffany Yun PA-C          Mid-Level Provider/Resident: Steffany Yun PA-C  Date: 9/1/21                                 Steffany Yun PA-C

## 2021-09-01 NOTE — H&P
Pre-Operative H & P     CC:  Preoperative exam to assess for increased cardiopulmonary risk while undergoing surgery and anesthesia.    Date of Encounter: 9/1/2021  Primary Care Physician:  Harleen Billy     Reason for visit:   Encounter Diagnoses   Name Primary?     Pre-op examination Yes     CUETO (nonalcoholic steatohepatitis)          HPI  Susan Puckett is a 51 year old female who presents for pre-operative H & P in preparation for ANESTHESIA OUT OF OR Recurrent Thoracentesis, Transjugular intrahepatic portosystemic shunt (TIPS) Procedure @0800 with Dr. Storey on 9/28/21 at [facility].     The patient is a 51-year-old woman with a past medical history significant for nonalcoholic steatohepatitis causing recurrent pleural effusions and chronic anemia and thrombocytopenia.  The patient is well has a past medical history of migraines, encephalopathy, diabetic seizure, type 2 diabetes on insulin, hypothyroidism, status post gastric bypass, cold sores, anxiety depression and insomnia.  Given the patient's need for recurrent thoracentesis she was referred to vascular surgery for consideration of TIPS.  The patient met with Dr. Storey on 8/16/2021 and has been scheduled for the procedure as above.    History is obtained from the patient and chart review      Hx of abnormal bleeding or anti-platelet use: none    Menstrual history: No LMP recorded. (Menstrual status: Irregular Periods).:     Prior to Admission Medications  Current Outpatient Medications   Medication Sig Dispense Refill     amylase-lipase-protease (CREON 6) [id number] units CPEP Take 3 capsules by mouth 3 times daily (with meals)       atorvastatin (LIPITOR) 20 MG tablet Take 20 mg by mouth every morning        calcium carbonate (TUMS) 500 MG chewable tablet Take 1 tablet by mouth daily as needed for heartburn        calcium citrate-vitamin D (CITRACAL W/D) 250-100 MG-UNIT tablet Take 2 tablets by mouth  2 times daily (with meals) (Patient taking differently: Take 1 tablet by mouth 2 times daily (with meals) ) 336 tablet 3     cyanocobalamin (VITAMIN B-12) 1000 MCG tablet Take 1,000 mcg by mouth every 7 days        Ferrous Sulfate (IRON) 325 (65 FE) MG tablet Take 1 tablet by mouth every morning        folic acid (FOLVITE) 1 MG tablet Take 1 mg by mouth every morning        furosemide (LASIX) 40 MG tablet Take 100 mg by mouth 2 times daily        hydrOXYzine (ATARAX) 25 MG tablet Take 25 mg by mouth 3 times daily as needed for anxiety       insulin glargine (LANTUS PEN) 100 UNIT/ML pen Inject 12 Units Subcutaneous At Bedtime       lactulose (CHRONULAC) 10 GM/15ML solution TAKE 30 MLS BY MOUTH 3 TIMES DAILY (Patient taking differently: 3 times daily ) 3784 mL 5     levothyroxine (SYNTHROID/LEVOTHROID) 175 MCG tablet Take 1 tablet (175 mcg) by mouth daily (Patient taking differently: Take 175 mcg by mouth every morning ) 30 tablet 0     Multiple Vitamin (MULTIVITAMIN PO) Take 2 tablets by mouth every morning        omeprazole (PRILOSEC) 20 MG DR capsule Take 1 capsule (20 mg) by mouth daily (Patient taking differently: Take 20 mg by mouth every evening ) 30 capsule 0     ondansetron (ZOFRAN-ODT) 4 MG ODT tab Place 4 mg under the tongue every 6 hours as needed for nausea        polyethylene glycol (MIRALAX) 17 g packet Take 1 packet by mouth daily as needed for constipation       rifampin (RIFADIN) 300 MG capsule TAKE 1 CAPSULE BY MOUTH EVERY DAY (Patient taking differently: every evening ) 90 capsule 3     rifaximin (XIFAXAN) 550 MG TABS tablet Take 1 tablet (550 mg) by mouth 2 times daily 60 tablet 11     simethicone (MYLICON) 80 MG chewable tablet Take 80 mg by mouth every 6 hours as needed for flatulence or cramping       sitagliptin (JANUVIA) 100 MG tablet Take 100 mg by mouth every morning        spironolactone (ALDACTONE) 100 MG tablet Take 2.5 tablets (250 mg) by mouth daily (Patient taking differently: Take  250 mg by mouth every morning ) 75 tablet 11     SUMAtriptan (IMITREX) 50 MG tablet Take 50 mg by mouth at onset of headache for migraine        traZODone (DESYREL) 100 MG tablet Take  mg by mouth nightly as needed for sleep       valACYclovir (VALTREX) 1000 mg tablet Take 1,000 mg by mouth daily as needed (at onset of cold sore)        venlafaxine (EFFEXOR) 75 MG tablet Take 1 tablet (75 mg) by mouth At Bedtime 30 tablet 0     vitamin A 3 MG (65579 UNITS) capsule Take 10,000 Units by mouth every morning        vitamin C (ASCORBIC ACID) 100 MG tablet Take 1,000 mg by mouth every morning        vitamin D3 (CHOLECALCIFEROL) 50 mcg (2000 units) tablet Take 1 tablet (50 mcg) by mouth daily (Patient taking differently: Take 1 tablet by mouth every morning ) 90 tablet 3     vitamin E (TOCOPHEROL) 400 units (180 mg) capsule Take 400 Units by mouth every morning        Vitamin K, Phytonadione, 100 MCG TABS Take 100 mcg by mouth every morning        multivitamin (DEKAS ESSENTIAL) capsule Take 1 capsule by mouth daily          Family History  Family History   Problem Relation Age of Onset     Anesthesia Reaction Nephew         PONV     Bleeding Disorder No family hx of      Clotting Disorder No family hx of        The complete review of systems is negative other than noted in the HPI or here.   Preprocedure Note     Last edited 21 1354 by Steffany Yun PA-C  Date of Service 21 1306  Creation Time 21 1306  Status: Addendum             Anesthesia Pre-Procedure Evaluation    Patient: Susan Puckett   MRN: [id number] : 1970        Preoperative Diagnosis: * No surgery found *   Procedure :      Past Medical History:   Diagnosis Date     Depressive disorder      Diabetes (H)     Type 2 DM/No Insulin      History of blood transfusion     10/2019     Liver cirrhosis secondary to CUETO (H) 2020     Thyroid disease       Past Surgical History:   Procedure Laterality Date     APPENDECTOMY       1989     COLONOSCOPY      1/2020 at [facility]      ENT SURGERY       GI SURGERY      EGD August 2020 at [facility]      GYN SURGERY      2010     RNY gastric bypass  01/2006    retoocolic, retrogastric, [facility]     VASCULAR SURGERY        Allergies   Allergen Reactions     Prednisone      Droperidol Anxiety     Nsaids Other (See Comments)      Social History     Tobacco Use     Smoking status: Never Smoker     Smokeless tobacco: Never Used   Substance Use Topics     Alcohol use: Not Currently     Comment: Last drink was in 2017      Wt Readings from Last 1 Encounters:   07/07/21 71.9 kg (158 lb 9.6 oz)        Anesthesia Evaluation   Pt has had prior anesthetic. Type: General and Regional.    No history of anesthetic complications       ROS/MED HX  ENT/Pulmonary: Comment: Recurrent pleural effusion needing thoracentesis - last on 8/13/21.    (-) tobacco use   Neurologic: Comment: History of encephalopathy     (+) migraines, seizures, features: history of diabetic seizure,  (-) no CVA and no TIA   Cardiovascular:     (+) Dyslipidemia -----Previous cardiac testing   Echo: Date: 7/31/20 Results:  CONCLUSIONS   Normal left ventricular size and global and regional function.   Left ventricular ejection fraction is visually estimated at 60%.   No significant valvular abnormalities were identified.   The inferior vena cava is normal suggesting normal RA pressure.   Pulmonary artery systolic pressure estimate is 22mmHg above RAP.   (Normal)     No prior study for comparison.     Stress Test: Date: Results:    ECG Reviewed: Date: 6/16/21 Results:  Sinus rhythm   Minimal voltage criteria for LVH, may be normal variant   Cannot rule out Anterior infarct (cited on or before 16-JUN-2021)   Abnormal ECG     Cath: Date: Results:      METS/Exercise Tolerance: 4 - Raking leaves, gardening    Hematologic: Comments: Thrombocytopenia     (+) anemia, history of blood transfusion, no previous transfusion reaction, Known PRBC  Anitbodies:No  (-) history of blood clots   Musculoskeletal:  - neg musculoskeletal ROS     GI/Hepatic: Comment: CUETO - patient has RUQ pain and itching     S/p gastric bypass    Grade I varices    History of paracentesis x1    (+) liver disease,  (-) GERD   Renal/Genitourinary:  - neg Renal ROS  (-) renal disease   Endo:     (+) type II DM, Last HgA1c: 6.9, date: 6/17/21, thyroid problem, hypothyroidism,     Psychiatric/Substance Use:     (+) psychiatric history anxiety, depression and other (comment) (insomnia )     Infectious Disease: Comment: Cold sore - currently has one on bottom lip      Malignancy:  - neg malignancy ROS     Other:  - neg other ROS              Virtual visit -  No vitals were obtained       Physical Exam  Constitutional: Awake, alert, cooperative, no apparent distress, and appears stated age.  Eyes: Pupils equal  HENT: Normocephalic  Respiratory: non labored breathing   Neurologic: Awake, alert, oriented to name, place and time.   Neuropsychiatric: Calm, cooperative. Normal affect.         PRIOR LABS/DIAGNOSTIC STUDIES:   All labs and imaging personally reviewed       LABS  The patient will complete labs on 9/3/21    EKG/ stress test - if available please see in ROS above     Echo 7/31/20  CONCLUSIONS   Normal left ventricular size and global and regional function.   Left ventricular ejection fraction is visually estimated at 60%.   No significant valvular abnormalities were identified.   The inferior vena cava is normal suggesting normal RA pressure.   Pulmonary artery systolic pressure estimate is 22mmHg above RAP.   (Normal)     No prior study for comparison.     EGD 4/1/21  Impression:          - Normal examined jejunum.                        - Faustino-en-Y gastrojejunostomy with                        gastrojejunal anastomosis                        characterized by healthy appearing                        mucosa.                        - Gastric-enteric fistula.                        -  Grade I esophageal varices.                        - No specimens collected.       The patient's records and results personally reviewed by this provider.     Outside records reviewed from: care everywhere      ASSESSMENT and PLAN  Susan is a 51 year old woman who is scheduled for ANESTHESIA OUT OF OR Recurrent Thoracentesis, Transjugular intrahepatic portosystemic shunt (TIPS) Procedure @0800 on 9/28/21 by Dr. Storey in treatment of CUETO (nonalcoholic steatohepatitis).  PAC referral for risk assessment and optimization for anesthesia with comorbid conditions of recurrent pleural effusions and chronic anemia and thrombocytopenia, migraines, encephalopathy, type 2 diabetes on insulin, hypothyroidism, status post gastric bypass, anxiety depression and insomnia :      Pre-operative considerations:   1.  Cardiac:  Functional status- METS 4, the patient is able to garden and do house work. She can walk a block and go up a flight of stairs. She denies any cardiac symptoms.  Low risk surgery with 0.9% (RCRI #) risk of major adverse cardiac event.    ~ The patient had EKG on 6/16/21 and echo on 7/31/20. She is on lipitor but no other cardiac risk factors or diagnosis. No further cardiac testing indicated.     2.  Pulm:  Airway feasible.  LEONEL risk: Low  ~ Recurrent pleural effusion - last thoracentesis on 8/30/21. She reports today she is sore and has some coughing but denies any URI symptoms or fevers or chills.     3. Heme:   Anemia/ thrombocytopenia - hgb 11.1 and platelets 105 on 8/19/21. Hold iron DOS. The patient has had a transfusion before in 2019. Will get T/S for surgery.     4. Neuro:  migraine - not on any specific medications  ~ history of encephalopathy - continue lactulose  ~ History of diabetic seizure     5. Endo: type 2 diabetes - hgb A1c 6.9 on 6/17/21. Patient has Dexcom G6. She will take 80% glargine and hold januvia DOS  ~ hypothyroidism - continue levothyroxine.     6. GI:  Risk of PONV score = 3.  If >  2, anti-emetic intervention recommended.   ~ CUETO - patient has RUQ pain and itching - followed by Dr. Cook and last seen on 7/7/21. She has only needed paracentesis once. She will continue simethicone, rifampin, rifaximin, and atarax. Hold Lasix, Aldactone and creon DOS. She last had EGD on 4/1/21 at which time non bleeding grade I varices seen.   ~ GERD - continue Prilosec and TUMS.   ~ Patient is s/p gastric bypass    7. ID: Cold sores - the patient currently has one on her bottom lip. Continue valtrex.     8. Psych: anxiety, depression, insomnia - continue trazodone and effexor. The patient follows with a therapist and feels she has a good support system.        VTE risk: 0.26%    ** Patient's visit was done virtually today.  A full physical exam was not completed.  Please refer to the physical examination documented by the anesthesiologist in the anesthesia record on the day of surgery. **      The patient is optimize and an acceptable candidate for the proposed procedure. Arrival time, NPO, shower and medication instructions provided by nursing staff today.      Video-Visit Details    Type of service:  Video Visit    Patient verbally consented to video service today: YES      Video Start Time: 1:17  Video End Time (time video stopped): 1:41    Originating Location (pt. Location): Home    Distant Location (provider location):  [facility] PREOPERATIVE ASSESSMENT CENTER     Mode of Communication:  Video Conference via [employer]      On the day of service:     Prep time: 13 minutes  Visit time: 24 minutes  Documentation time: 19 minutes  ------------------------------------------  Total time: 54 minutes      Steffany Yun PA-C  Preoperative Assessment Center  [facility]  Clinic and Surgery Center  Phone: [phone]  Fax: [phone]

## 2021-09-01 NOTE — PATIENT INSTRUCTIONS
Preparing for Your Surgery      Name:  Susan Puckett   MRN:  [id number]   :  1970   Today's Date:  2021       Arriving for surgery:  Surgery date:  21  Arrival time:  6AM    Restrictions due to COVID 19:       One visitor is allowed in the Pre Op area. When you go into surgery, one visitor is allowed to wait in the Surgery Waiting Room       (provided there is enough space to social distance).   After surgery- Two visitors are allowed at a time if you have a private room and one visitor is allowed for those in a semi-private room.   Every 4 days the visitor(s) can rotate. During the 4 day period, the visitor(s) must be consistent. No visitors under the age of 18 years old.   Visiting Hours: 8 am - 8:30 pm   No ill visitors.   All visitors must wear face mask.    [employer] parking is available for anyone with mobility limitations or disabilities.  ([place]  24 hours/ 7 days a week; [facility]  7 am- 3:30 pm, Mon- Fri)    Please come to:     [facility] [address]  [address] [place] [address]    - ? parking is available in front of the hospital      -    Please proceed to [address] on the 3rd floor. [phone]?     - ?If you are in need of directions, wheelchair or escort please stop at the Information Desk in the lobby.  Inform the information person that you are here for surgery; a wheelchair and escort to [address] will be provided.?     What can I eat or drink?  -  You may eat and drink normally for up to 8 hours before your surgery. (Until Midnight)  -  You may have clear liquids until 2 hours before surgery. (Until 21, 6AM)    Examples of clear liquids:  Water  Clear broth  Juices (apple, white grape, white cranberry  and cider) without pulp  Noncarbonated, powder based beverages  (lemonade and Christos-Aid)  Sodas (Sprite, 7-Up, ginger ale and seltzer)  Coffee or tea (without milk or cream)  Gatorade    -  No Alcohol for at  least 24 hours before surgery     Which medicines can I take?    Hold Aspirin for 7 days before surgery.   Hold Multivitamins for 7 days before surgery.  Hold Supplements, Vitamin C and Vitamin E for 7 days before surgery.  Hold Ibuprofen (Advil, Motrin) for 1 day before surgery--unless otherwise directed by surgeon.  Hold Naproxen (Aleve) for 4 days before surgery.    -  DO NOT take these medications the day of surgery:    Creon     Tums    Calcium(Citracal)   Vitamin B12    Ferrous Sulfate   Folic Acid    Furosemide(Lasix)   Lactulose    Miralax     Simethicone(Mylicon)    Sitagliptin(Januvia)   Spironolactone(Aldactone)    Sumatriptan(Imitrex)   Vitamin D3     Vitamin K    Vitamin A      -  PLEASE TAKE these medications the day of surgery:    Please take 10 Units of Lantus Insulin the evening prior to surgery.     Atorvastatin(Lipitor)   Hydroxyzine(Atarax) as needed    Levothyroxine    Ondansetron(Zofran) as needed        How do I prepare myself?  - Please take 2 showers before surgery using Scrubcare or Hibiclens soap.    Use this soap only from the neck to your toes.     Leave the soap on your skin for one minute--then rinse thoroughly.      You may use your own shampoo and conditioner; no other hair products.   - Please remove all jewelry and body piercings.  - No lotions, deodorants or fragrance.  - No makeup or fingernail polish.   - Bring your ID and insurance card.    -If you have a Deep Brain Stimulator, Spinal Cord Stimulator or any neuro stimulator device---you must bring the remote control to the hospital     - All patients are required to have a Covid-19 test within 4 days of surgery/procedure.      -Patients will be contacted by the [facility] scheduling team within 1 week of surgery to make an appointment.      - Patients may call the Scheduling team at [phone] if they have not been scheduled within 4 days of  surgery.      ALL PATIENTS GOING HOME THE SAME DAY OF SURGERY ARE REQUIRED TO  HAVE A RESPONSIBLE ADULT TO DRIVE AND BE IN ATTENDANCE WITH THEM FOR 24 HOURS FOLLOWING SURGERY.      Questions or Concerns:    - For any questions regarding the day of surgery or your hospital stay, please contact the Pre Admission Nursing Office at [phone].       - If you have health changes between today and your surgery please call your surgeon.       For questions after surgery please call your surgeons office.

## 2021-09-01 NOTE — PROGRESS NOTES
Susan is a 51 year old who is being evaluated via a billable video visit.      How would you like to obtain your AVS? MyChart  If the video visit is dropped, the invitation should be resent by: Text to cell phone: [phone]    HPI         Review of Systems         Physical Exam

## 2021-09-03 ENCOUNTER — PATIENT OUTREACH (OUTPATIENT)
Dept: GASTROENTEROLOGY | Facility: CLINIC | Age: 51
End: 2021-09-03

## 2021-09-03 ENCOUNTER — LAB (OUTPATIENT)
Dept: LAB | Facility: CLINIC | Age: 51
End: 2021-09-03
Payer: COMMERCIAL

## 2021-09-03 DIAGNOSIS — J91.8 PLEURAL EFFUSION ASSOCIATED WITH HEPATIC DISORDER: ICD-10-CM

## 2021-09-03 DIAGNOSIS — K76.9 PLEURAL EFFUSION ASSOCIATED WITH HEPATIC DISORDER: ICD-10-CM

## 2021-09-03 DIAGNOSIS — K75.81 NASH (NONALCOHOLIC STEATOHEPATITIS): ICD-10-CM

## 2021-09-03 DIAGNOSIS — Z01.818 PRE-OP EXAMINATION: ICD-10-CM

## 2021-09-03 LAB
ABO/RH(D): NORMAL
ALBUMIN SERPL-MCNC: 2.9 G/DL (ref 3.4–5)
ALP SERPL-CCNC: 131 U/L (ref 40–150)
ALT SERPL W P-5'-P-CCNC: 26 U/L (ref 0–50)
ANION GAP SERPL CALCULATED.3IONS-SCNC: 5 MMOL/L (ref 3–14)
ANTIBODY SCREEN: NEGATIVE
AST SERPL W P-5'-P-CCNC: 36 U/L (ref 0–45)
BILIRUB DIRECT SERPL-MCNC: 0.2 MG/DL (ref 0–0.2)
BILIRUB SERPL-MCNC: 0.6 MG/DL (ref 0.2–1.3)
BUN SERPL-MCNC: 9 MG/DL (ref 7–30)
CALCIUM SERPL-MCNC: 8.3 MG/DL (ref 8.5–10.1)
CHLORIDE BLD-SCNC: 109 MMOL/L (ref 94–109)
CO2 SERPL-SCNC: 24 MMOL/L (ref 20–32)
CREAT SERPL-MCNC: 0.97 MG/DL (ref 0.52–1.04)
ERYTHROCYTE [DISTWIDTH] IN BLOOD BY AUTOMATED COUNT: 15.4 % (ref 10–15)
GFR SERPL CREATININE-BSD FRML MDRD: 69 ML/MIN/1.73M2
GLUCOSE BLD-MCNC: 320 MG/DL (ref 70–99)
HCT VFR BLD AUTO: 33.8 % (ref 35–47)
HGB BLD-MCNC: 11.1 G/DL (ref 11.7–15.7)
INR PPP: 1.32 (ref 0.85–1.15)
MCH RBC QN AUTO: 33.1 PG (ref 26.5–33)
MCHC RBC AUTO-ENTMCNC: 32.8 G/DL (ref 31.5–36.5)
MCV RBC AUTO: 101 FL (ref 78–100)
PLATELET # BLD AUTO: 93 10E3/UL (ref 150–450)
POTASSIUM BLD-SCNC: 3.6 MMOL/L (ref 3.4–5.3)
PROT SERPL-MCNC: 7.3 G/DL (ref 6.8–8.8)
RBC # BLD AUTO: 3.35 10E6/UL (ref 3.8–5.2)
SODIUM SERPL-SCNC: 138 MMOL/L (ref 133–144)
SPECIMEN EXPIRATION DATE: NORMAL
WBC # BLD AUTO: 3.6 10E3/UL (ref 4–11)

## 2021-09-03 PROCEDURE — 80048 BASIC METABOLIC PNL TOTAL CA: CPT

## 2021-09-03 PROCEDURE — 86901 BLOOD TYPING SEROLOGIC RH(D): CPT

## 2021-09-03 PROCEDURE — 80076 HEPATIC FUNCTION PANEL: CPT

## 2021-09-03 PROCEDURE — 86900 BLOOD TYPING SEROLOGIC ABO: CPT

## 2021-09-03 PROCEDURE — 85027 COMPLETE CBC AUTOMATED: CPT

## 2021-09-03 PROCEDURE — 85610 PROTHROMBIN TIME: CPT

## 2021-09-03 PROCEDURE — 86850 RBC ANTIBODY SCREEN: CPT

## 2021-09-03 PROCEDURE — 36415 COLL VENOUS BLD VENIPUNCTURE: CPT

## 2021-09-03 NOTE — PROGRESS NOTES
Pt had thoracentesis on 8/31 and had 875 ml of fluid removed. Pt reported improvement in shortness of breath and cough post procedure. Pt continues to be compliant with diuretics.Pt reported decreased abdominal bloating and more bowel movements with increase in bowel regimen. Pt is taking lactulose 2 times daily and miralax daily. Pt has scheduled her third COVID vaccine and has TIPS scheduled for 9/28.     Plan to check in with pt in 1-2 weeks. Pt verbalized understanding and is agreeable to plan of care.

## 2021-09-07 ENCOUNTER — IMMUNIZATION (OUTPATIENT)
Dept: NURSING | Facility: CLINIC | Age: 51
End: 2021-09-07
Payer: COMMERCIAL

## 2021-09-07 PROCEDURE — 0003A PR COVID VAC PFIZER DIL RECON 30 MCG/0.3 ML IM: CPT

## 2021-09-07 PROCEDURE — 91300 PR COVID VAC PFIZER DIL RECON 30 MCG/0.3 ML IM: CPT

## 2021-09-15 RX ORDER — NICOTINE POLACRILEX 4 MG
15-30 LOZENGE BUCCAL
Status: CANCELLED | OUTPATIENT
Start: 2021-09-15

## 2021-09-15 RX ORDER — DEXTROSE MONOHYDRATE 25 G/50ML
25-50 INJECTION, SOLUTION INTRAVENOUS
Status: CANCELLED | OUTPATIENT
Start: 2021-09-15

## 2021-09-15 RX ORDER — AMPICILLIN AND SULBACTAM 2; 1 G/1; G/1
3 INJECTION, POWDER, FOR SOLUTION INTRAMUSCULAR; INTRAVENOUS
Status: CANCELLED | OUTPATIENT
Start: 2021-09-15

## 2021-09-17 ENCOUNTER — TRANSFERRED RECORDS (OUTPATIENT)
Dept: HEALTH INFORMATION MANAGEMENT | Facility: CLINIC | Age: 51
End: 2021-09-17

## 2021-09-18 ENCOUNTER — TELEPHONE (OUTPATIENT)
Dept: TRANSPLANT | Facility: CLINIC | Age: 51
End: 2021-09-18

## 2021-09-18 NOTE — TELEPHONE ENCOUNTER
Susan had thoracentesis on 9/17 at [facility]. Yesterday they took of 1.1L.  She was coughing afterward, but started to feel worse throughout the day and she has chest pressure.     She is having difficulty breathing, walking to the bathroom is challenging.     She has not experienced the pressure after thoracentesis in the past.    TIPS is scheduled 9/28.    She has had up to 4 thoracenteses in 1 week.   Susan was unable to check her BP while on the phone.      Due to chest pressure, RNCC recommended she proceed to ED.  Susan was in agreement. Report called to the ED ([facility]).

## 2021-09-20 ENCOUNTER — TELEPHONE (OUTPATIENT)
Dept: TRANSPLANT | Facility: CLINIC | Age: 51
End: 2021-09-20

## 2021-09-20 ENCOUNTER — TELEPHONE (OUTPATIENT)
Dept: VASCULAR SURGERY | Facility: CLINIC | Age: 51
End: 2021-09-20

## 2021-09-20 NOTE — TELEPHONE ENCOUNTER
Update- had informed IR and Dr. Cook that patient admitted, TIPS canceled at this point.    Spoke with patient, still in hopsital, will call when discharged or if situation changes otherwise will be at outside hospital for antbx, julio césaras.  Per note in CE will get blood cxs and repeat labs today.Currently on iv antbx, no fevers, not on any oxygen.  Uncertain if + cx on fluid contaminant vs true infection

## 2021-09-20 NOTE — TELEPHONE ENCOUNTER
I called and spoke with Susan, she is currently admitted and I have cancelled her 9/28/21 TIPS procedure with Dr Storey.  I have sent a message through [employer] to have her reach back out once she get's discharged and is doing better.  She did have a positive culture.  MARY ANN Philippe RN, BSN  Interventional Radiology Nurse Coordinator   Phone:  [phone]

## 2021-09-22 ENCOUNTER — PATIENT OUTREACH (OUTPATIENT)
Dept: GASTROENTEROLOGY | Facility: CLINIC | Age: 51
End: 2021-09-22

## 2021-09-22 NOTE — PROGRESS NOTES
Per chart review, pt is currently hospitalized at OSH with strep bacteremia. Pt's TIPS scheduled for 9/28 has been cancelled. Writer will continue to follow plan of care and reach out to pt once discharged.

## 2021-09-28 NOTE — TELEPHONE ENCOUNTER
MyChart to patient to check in ho she is feeling, recovery after recent hospitalization.      Once recovery, IR to determine plan for TIPS rescheduling.

## 2021-10-04 ENCOUNTER — TELEPHONE (OUTPATIENT)
Dept: TRANSPLANT | Facility: CLINIC | Age: 51
End: 2021-10-04

## 2021-10-04 DIAGNOSIS — K74.60 CIRRHOSIS OF LIVER (H): Primary | ICD-10-CM

## 2021-10-04 NOTE — TELEPHONE ENCOUNTER
Susan called, feeling like has fluid on lungs    Will call to see how soon she can get in for outpatient thora, has orders at [facility] currently.  If unable to get in, develops SOB or fevers will present to ED.    Follow up appt with Dr. Cook tomorrow, requested labs before appt. - need updated MELD, platelet count trending down, etc.

## 2021-10-05 ENCOUNTER — TELEPHONE (OUTPATIENT)
Dept: INTERVENTIONAL RADIOLOGY/VASCULAR | Facility: CLINIC | Age: 51
End: 2021-10-05
Payer: COMMERCIAL

## 2021-10-05 ENCOUNTER — OFFICE VISIT (OUTPATIENT)
Dept: GASTROENTEROLOGY | Facility: CLINIC | Age: 51
End: 2021-10-05
Attending: INTERNAL MEDICINE
Payer: COMMERCIAL

## 2021-10-05 ENCOUNTER — LAB (OUTPATIENT)
Dept: LAB | Facility: CLINIC | Age: 51
End: 2021-10-05
Attending: INTERNAL MEDICINE
Payer: COMMERCIAL

## 2021-10-05 VITALS
WEIGHT: 171.9 LBS | OXYGEN SATURATION: 99 % | SYSTOLIC BLOOD PRESSURE: 120 MMHG | DIASTOLIC BLOOD PRESSURE: 82 MMHG | BODY MASS INDEX: 27.75 KG/M2 | HEART RATE: 74 BPM

## 2021-10-05 DIAGNOSIS — K74.60 CIRRHOSIS OF LIVER WITH ASCITES, UNSPECIFIED HEPATIC CIRRHOSIS TYPE (H): Primary | ICD-10-CM

## 2021-10-05 DIAGNOSIS — K74.60 CIRRHOSIS OF LIVER (H): ICD-10-CM

## 2021-10-05 DIAGNOSIS — R18.8 CIRRHOSIS OF LIVER WITH ASCITES, UNSPECIFIED HEPATIC CIRRHOSIS TYPE (H): Primary | ICD-10-CM

## 2021-10-05 LAB
ALBUMIN SERPL-MCNC: 2.4 G/DL (ref 3.4–5)
ALP SERPL-CCNC: 142 U/L (ref 40–150)
ALT SERPL W P-5'-P-CCNC: 29 U/L (ref 0–50)
ANION GAP SERPL CALCULATED.3IONS-SCNC: 6 MMOL/L (ref 3–14)
AST SERPL W P-5'-P-CCNC: 51 U/L (ref 0–45)
BILIRUB DIRECT SERPL-MCNC: 0.3 MG/DL (ref 0–0.2)
BILIRUB SERPL-MCNC: 0.7 MG/DL (ref 0.2–1.3)
BUN SERPL-MCNC: 8 MG/DL (ref 7–30)
CALCIUM SERPL-MCNC: 8.2 MG/DL (ref 8.5–10.1)
CHLORIDE BLD-SCNC: 111 MMOL/L (ref 94–109)
CO2 SERPL-SCNC: 27 MMOL/L (ref 20–32)
CREAT SERPL-MCNC: 0.78 MG/DL (ref 0.52–1.04)
ERYTHROCYTE [DISTWIDTH] IN BLOOD BY AUTOMATED COUNT: 15.1 % (ref 10–15)
GFR SERPL CREATININE-BSD FRML MDRD: 90 ML/MIN/1.73M2
GLUCOSE BLD-MCNC: 184 MG/DL (ref 70–99)
HCT VFR BLD AUTO: 32.9 % (ref 35–47)
HGB BLD-MCNC: 10.2 G/DL (ref 11.7–15.7)
INR PPP: 1.25 (ref 0.85–1.15)
MCH RBC QN AUTO: 32.4 PG (ref 26.5–33)
MCHC RBC AUTO-ENTMCNC: 31 G/DL (ref 31.5–36.5)
MCV RBC AUTO: 104 FL (ref 78–100)
PLATELET # BLD AUTO: 83 10E3/UL (ref 150–450)
POTASSIUM BLD-SCNC: 3.2 MMOL/L (ref 3.4–5.3)
PROT SERPL-MCNC: 6.8 G/DL (ref 6.8–8.8)
RBC # BLD AUTO: 3.15 10E6/UL (ref 3.8–5.2)
SODIUM SERPL-SCNC: 144 MMOL/L (ref 133–144)
WBC # BLD AUTO: 2.8 10E3/UL (ref 4–11)

## 2021-10-05 PROCEDURE — 80053 COMPREHEN METABOLIC PANEL: CPT | Performed by: PATHOLOGY

## 2021-10-05 PROCEDURE — 36415 COLL VENOUS BLD VENIPUNCTURE: CPT | Performed by: PATHOLOGY

## 2021-10-05 PROCEDURE — 85610 PROTHROMBIN TIME: CPT | Performed by: PATHOLOGY

## 2021-10-05 PROCEDURE — 80321 ALCOHOLS BIOMARKERS 1OR 2: CPT | Mod: 90 | Performed by: PATHOLOGY

## 2021-10-05 PROCEDURE — 85027 COMPLETE CBC AUTOMATED: CPT | Performed by: PATHOLOGY

## 2021-10-05 PROCEDURE — 99215 OFFICE O/P EST HI 40 MIN: CPT | Performed by: INTERNAL MEDICINE

## 2021-10-05 PROCEDURE — 82248 BILIRUBIN DIRECT: CPT | Performed by: PATHOLOGY

## 2021-10-05 NOTE — TELEPHONE ENCOUNTER
Referring providers name, location, phone number and fax:   Raphael oCok MD    HEPATOLOGY  Phone [phone]  Fax not found      Eddie Storey MD    [facility]  Phone [phone]  Fax [phone]      Reason for visit:   IR TRSVEN INTRAHEPAT PORT SHNT     Does patient have a referral:  Yes    Referral to specific provider?   Pt requests Dr Storey    Medical records or notes if possible:   In Epic    Pt wants to reschedule 9/28/21 appt.    Pt relayed the following information:  She has completed her antibiotics  She is going in for another thorocentesis today 10/5/21

## 2021-10-05 NOTE — TELEPHONE ENCOUNTER
I called and left a voicemail.   I have sent a [employer] message to Susan.  I will reschedule TIPS once cleared by Dr Storey and Dr Cook after reviewing labs.  MARY ANN Philippe, RN, BSN  Interventional Radiology Nurse Coordinator   Phone:  [phone]

## 2021-10-05 NOTE — NURSING NOTE
Chief Complaint   Patient presents with     RECHECK     3 month f/u     Blood pressure 120/82, pulse 74, weight 78 kg (171 lb 14.4 oz), SpO2 99 %, not currently breastfeeding.    Mae Scanlon, CMA

## 2021-10-05 NOTE — LETTER
10/5/2021         RE: Susan Puckett  [address] [facility] [address]        Dear Colleague,    Thank you for referring your patient, Susan Puckett, to the [facility] [place]. Please see a copy of my visit note below.    I had the pleasure of seeing Susan Puckett for followup in the Liver Transplant Clinic at the [facility] on 10/05/2021.  Ms. 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    She is still not doing well.  She does complain of shortness of breath and does have an appointment for thoracentesis this afternoon.  They have tried increasing diuretics in the past, but her kidney function worsens.  She was scheduled for a TIPS procedure, but she had a positive blood culture during her last admission for a strep species; thus, the TIPS was postponed.    She does complain of some abdominal discomfort.  She feels as though her abdomen is hard and tense.  She does have some itching without skin rash and does complain of a moderate amount of fatigue.  She has not had any lower extremity edema.  She has not had any gastrointestinal bleeding or any overt signs of hepatic encephalopathy.    She denies any fevers or chills.  She does have a cough as well as shortness of breath.  She denies any nausea or vomiting and is having at least 2-5 bowel movements per day on her current dose of lactulose.  She reports her appetite is decreased and her weight has remained approximately the same.    She is fully vaccinated against COVID-19 and does report that her diabetes has been under good control.    Current Outpatient Medications   Medication     amylase-lipase-protease (CREON 6) [id number] units CPEP     atorvastatin (LIPITOR) 20 MG tablet     calcium carbonate (TUMS) 500 MG chewable tablet     calcium citrate-vitamin D (CITRACAL W/D) 250-100  MG-UNIT tablet     cyanocobalamin (VITAMIN B-12) 1000 MCG tablet     Ferrous Sulfate (IRON) 325 (65 FE) MG tablet     folic acid (FOLVITE) 1 MG tablet     furosemide (LASIX) 40 MG tablet     hydrOXYzine (ATARAX) 25 MG tablet     insulin glargine (LANTUS PEN) 100 UNIT/ML pen     lactulose (CHRONULAC) 10 GM/15ML solution     levothyroxine (SYNTHROID/LEVOTHROID) 175 MCG tablet     Multiple Vitamin (MULTIVITAMIN PO)     multivitamin (DEKAS ESSENTIAL) capsule     omeprazole (PRILOSEC) 20 MG DR capsule     ondansetron (ZOFRAN-ODT) 4 MG ODT tab     polyethylene glycol (MIRALAX) 17 g packet     rifampin (RIFADIN) 300 MG capsule     rifaximin (XIFAXAN) 550 MG TABS tablet     simethicone (MYLICON) 80 MG chewable tablet     sitagliptin (JANUVIA) 100 MG tablet     spironolactone (ALDACTONE) 100 MG tablet     SUMAtriptan (IMITREX) 50 MG tablet     traZODone (DESYREL) 100 MG tablet     valACYclovir (VALTREX) 1000 mg tablet     venlafaxine (EFFEXOR) 75 MG tablet     vitamin A 3 MG (39754 UNITS) capsule     vitamin C (ASCORBIC ACID) 100 MG tablet     vitamin D3 (CHOLECALCIFEROL) 50 mcg (2000 units) tablet     vitamin E (TOCOPHEROL) 400 units (180 mg) capsule     Vitamin K, Phytonadione, 100 MCG TABS     No current facility-administered medications for this visit.     /82   Pulse 74   Wt 78 kg (171 lb 14.4 oz)   SpO2 99%   BMI 27.75 kg/m      GENERAL:  She actually is weepy, but in no acute distress.  HEENT:  Shows no scleral icterus or temporal muscle wasting.  CHEST:  Shows normal S1 third of the way up on the right.  There are some decreased breath sounds as well.    ABDOMEN: Her abdomen is soft.  She does complain of some discomfort to palpation in both upper quadrants.  Her liver is 10 cm in span without left lobe enlargement.  No spleen tip is palpable.  EXTREMITIES:  No edema.  SKIN:  No stigmata of chronic liver disease.  NEUROLOGIC:  Shows no asterixis.    Recent Results (from the past 168 hour(s))   Basic  metabolic panel    Collection Time: 10/05/21 10:13 AM   Result Value Ref Range    Sodium 144 133 - 144 mmol/L    Potassium 3.2 (L) 3.4 - 5.3 mmol/L    Chloride 111 (H) 94 - 109 mmol/L    Carbon Dioxide (CO2) 27 20 - 32 mmol/L    Anion Gap 6 3 - 14 mmol/L    Urea Nitrogen 8 7 - 30 mg/dL    Creatinine 0.78 0.52 - 1.04 mg/dL    Calcium 8.2 (L) 8.5 - 10.1 mg/dL    Glucose 184 (H) 70 - 99 mg/dL    GFR Estimate 90 >60 mL/min/1.73m2   Hepatic panel    Collection Time: 10/05/21 10:13 AM   Result Value Ref Range    Bilirubin Total 0.7 0.2 - 1.3 mg/dL    Bilirubin Direct 0.3 (H) 0.0 - 0.2 mg/dL    Protein Total 6.8 6.8 - 8.8 g/dL    Albumin 2.4 (L) 3.4 - 5.0 g/dL    Alkaline Phosphatase 142 40 - 150 U/L    AST 51 (H) 0 - 45 U/L    ALT 29 0 - 50 U/L   INR    Collection Time: 10/05/21 10:13 AM   Result Value Ref Range    INR 1.25 (H) 0.85 - 1.15   CBC with platelets    Collection Time: 10/05/21 10:13 AM   Result Value Ref Range    WBC Count 2.8 (L) 4.0 - 11.0 10e3/uL    RBC Count 3.15 (L) 3.80 - 5.20 10e6/uL    Hemoglobin 10.2 (L) 11.7 - 15.7 g/dL    Hematocrit 32.9 (L) 35.0 - 47.0 %     (H) 78 - 100 fL    MCH 32.4 26.5 - 33.0 pg    MCHC 31.0 (L) 31.5 - 36.5 g/dL    RDW 15.1 (H) 10.0 - 15.0 %    Platelet Count 83 (L) 150 - 450 10e3/uL      MELD-Na score: 9 at 10/5/2021 10:13 AM  MELD score: 9 at 10/5/2021 10:13 AM  Calculated from:  Serum Creatinine: 0.78 mg/dL (Using min of 1 mg/dL) at 10/5/2021 10:13 AM  Serum Sodium: 144 mmol/L (Using max of 137 mmol/L) at 10/5/2021 10:13 AM  Total Bilirubin: 0.7 mg/dL (Using min of 1 mg/dL) at 10/5/2021 10:13 AM  INR(ratio): 1.25 at 10/5/2021 10:13 AM  Age: 51 years    IMPRESSION:  My impression is that Ms. Puckett has cirrhosis caused by nonalcoholic fatty liver disease.  Her MELD score remains low at 9.  She does have several people who are being considered for a live donors.  Their status is listed as deferred which I would assume means they need to lose some weight.  She does have  a thoracentesis scheduled for today and I have encouraged her to follow up on that.  I have also encouraged her to make a followup appointment for her TIPS procedure as I think this could provide her significant relief.  I otherwise will not be making any other change to her medical regimen.  I will see her back in the clinic again in 3 months.    As mentioned, she has had a booster vaccine for COVID-19 and is up to date with regard to the rest of her cancer screening and vaccines.    Thank you very much for allowing me to participate in the care of this patient.  If you have any questions regarding recommendations, please do not hesitate to contact me.        Raphael Cook MD      Professor of Medicine  [employer]      Executive Medical Director, Solid Organ Transplant Program  [facility]       Again, thank you for allowing me to participate in the care of your patient.        Sincerely,        Raphael Cook MD

## 2021-10-05 NOTE — PROGRESS NOTES
I had the pleasure of seeing Susan Puckett for followup in the Liver Transplant Clinic at the [facility] on 10/05/2021.  Ms. 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    She is still not doing well.  She does complain of shortness of breath and does have an appointment for thoracentesis this afternoon.  They have tried increasing diuretics in the past, but her kidney function worsens.  She was scheduled for a TIPS procedure, but she had a positive blood culture during her last admission for a strep species; thus, the TIPS was postponed.    She does complain of some abdominal discomfort.  She feels as though her abdomen is hard and tense.  She does have some itching without skin rash and does complain of a moderate amount of fatigue.  She has not had any lower extremity edema.  She has not had any gastrointestinal bleeding or any overt signs of hepatic encephalopathy.    She denies any fevers or chills.  She does have a cough as well as shortness of breath.  She denies any nausea or vomiting and is having at least 2-5 bowel movements per day on her current dose of lactulose.  She reports her appetite is decreased and her weight has remained approximately the same.    She is fully vaccinated against COVID-19 and does report that her diabetes has been under good control.    Current Outpatient Medications   Medication     amylase-lipase-protease (CREON 6) [id number] units CPEP     atorvastatin (LIPITOR) 20 MG tablet     calcium carbonate (TUMS) 500 MG chewable tablet     calcium citrate-vitamin D (CITRACAL W/D) 250-100 MG-UNIT tablet     cyanocobalamin (VITAMIN B-12) 1000 MCG tablet     Ferrous Sulfate (IRON) 325 (65 FE) MG tablet     folic acid (FOLVITE) 1 MG tablet     furosemide (LASIX) 40 MG tablet     hydrOXYzine (ATARAX) 25 MG tablet     insulin glargine (LANTUS PEN) 100  UNIT/ML pen     lactulose (CHRONULAC) 10 GM/15ML solution     levothyroxine (SYNTHROID/LEVOTHROID) 175 MCG tablet     Multiple Vitamin (MULTIVITAMIN PO)     multivitamin (DEKAS ESSENTIAL) capsule     omeprazole (PRILOSEC) 20 MG DR capsule     ondansetron (ZOFRAN-ODT) 4 MG ODT tab     polyethylene glycol (MIRALAX) 17 g packet     rifampin (RIFADIN) 300 MG capsule     rifaximin (XIFAXAN) 550 MG TABS tablet     simethicone (MYLICON) 80 MG chewable tablet     sitagliptin (JANUVIA) 100 MG tablet     spironolactone (ALDACTONE) 100 MG tablet     SUMAtriptan (IMITREX) 50 MG tablet     traZODone (DESYREL) 100 MG tablet     valACYclovir (VALTREX) 1000 mg tablet     venlafaxine (EFFEXOR) 75 MG tablet     vitamin A 3 MG (18412 UNITS) capsule     vitamin C (ASCORBIC ACID) 100 MG tablet     vitamin D3 (CHOLECALCIFEROL) 50 mcg (2000 units) tablet     vitamin E (TOCOPHEROL) 400 units (180 mg) capsule     Vitamin K, Phytonadione, 100 MCG TABS     No current facility-administered medications for this visit.     /82   Pulse 74   Wt 78 kg (171 lb 14.4 oz)   SpO2 99%   BMI 27.75 kg/m      GENERAL:  She actually is weepy, but in no acute distress.  HEENT:  Shows no scleral icterus or temporal muscle wasting.  CHEST:  Shows normal S1 third of the way up on the right.  There are some decreased breath sounds as well.    ABDOMEN: Her abdomen is soft.  She does complain of some discomfort to palpation in both upper quadrants.  Her liver is 10 cm in span without left lobe enlargement.  No spleen tip is palpable.  EXTREMITIES:  No edema.  SKIN:  No stigmata of chronic liver disease.  NEUROLOGIC:  Shows no asterixis.    Recent Results (from the past 168 hour(s))   Basic metabolic panel    Collection Time: 10/05/21 10:13 AM   Result Value Ref Range    Sodium 144 133 - 144 mmol/L    Potassium 3.2 (L) 3.4 - 5.3 mmol/L    Chloride 111 (H) 94 - 109 mmol/L    Carbon Dioxide (CO2) 27 20 - 32 mmol/L    Anion Gap 6 3 - 14 mmol/L    Urea  Nitrogen 8 7 - 30 mg/dL    Creatinine 0.78 0.52 - 1.04 mg/dL    Calcium 8.2 (L) 8.5 - 10.1 mg/dL    Glucose 184 (H) 70 - 99 mg/dL    GFR Estimate 90 >60 mL/min/1.73m2   Hepatic panel    Collection Time: 10/05/21 10:13 AM   Result Value Ref Range    Bilirubin Total 0.7 0.2 - 1.3 mg/dL    Bilirubin Direct 0.3 (H) 0.0 - 0.2 mg/dL    Protein Total 6.8 6.8 - 8.8 g/dL    Albumin 2.4 (L) 3.4 - 5.0 g/dL    Alkaline Phosphatase 142 40 - 150 U/L    AST 51 (H) 0 - 45 U/L    ALT 29 0 - 50 U/L   INR    Collection Time: 10/05/21 10:13 AM   Result Value Ref Range    INR 1.25 (H) 0.85 - 1.15   CBC with platelets    Collection Time: 10/05/21 10:13 AM   Result Value Ref Range    WBC Count 2.8 (L) 4.0 - 11.0 10e3/uL    RBC Count 3.15 (L) 3.80 - 5.20 10e6/uL    Hemoglobin 10.2 (L) 11.7 - 15.7 g/dL    Hematocrit 32.9 (L) 35.0 - 47.0 %     (H) 78 - 100 fL    MCH 32.4 26.5 - 33.0 pg    MCHC 31.0 (L) 31.5 - 36.5 g/dL    RDW 15.1 (H) 10.0 - 15.0 %    Platelet Count 83 (L) 150 - 450 10e3/uL      MELD-Na score: 9 at 10/5/2021 10:13 AM  MELD score: 9 at 10/5/2021 10:13 AM  Calculated from:  Serum Creatinine: 0.78 mg/dL (Using min of 1 mg/dL) at 10/5/2021 10:13 AM  Serum Sodium: 144 mmol/L (Using max of 137 mmol/L) at 10/5/2021 10:13 AM  Total Bilirubin: 0.7 mg/dL (Using min of 1 mg/dL) at 10/5/2021 10:13 AM  INR(ratio): 1.25 at 10/5/2021 10:13 AM  Age: 51 years    IMPRESSION:  My impression is that Ms. Puckett has cirrhosis caused by nonalcoholic fatty liver disease.  Her MELD score remains low at 9.  She does have several people who are being considered for a live donors.  Their status is listed as deferred which I would assume means they need to lose some weight.  She does have a thoracentesis scheduled for today and I have encouraged her to follow up on that.  I have also encouraged her to make a followup appointment for her TIPS procedure as I think this could provide her significant relief.  I otherwise will not be making any other  change to her medical regimen.  I will see her back in the clinic again in 3 months.    As mentioned, she has had a booster vaccine for COVID-19 and is up to date with regard to the rest of her cancer screening and vaccines.    Thank you very much for allowing me to participate in the care of this patient.  If you have any questions regarding recommendations, please do not hesitate to contact me.        Raphael Cook MD      Professor of Medicine  [employer]      Executive Medical Director, Solid Organ Transplant Program  [facility]    Consent (Scalp)/Introductory Paragraph: The rationale for Mohs was explained to the patient and consent was obtained. The risks, benefits and alternatives to therapy were discussed in detail. Specifically, the risks of changes in hair growth pattern secondary to repair, infection, scarring, bleeding, prolonged wound healing, incomplete removal, allergy to anesthesia, nerve injury and recurrence were addressed. Prior to the procedure, the treatment site was clearly identified and confirmed by the patient. All components of Universal Protocol/PAUSE Rule completed.

## 2021-10-08 ENCOUNTER — PATIENT OUTREACH (OUTPATIENT)
Dept: GASTROENTEROLOGY | Facility: CLINIC | Age: 51
End: 2021-10-08

## 2021-10-08 NOTE — PROGRESS NOTES
Pt had hepatology follow up on 10/5. Plan established at clinic visit per Dr. Cook:     1) Continue thoracentesis as needed  2) Follow up on rescheduling TIPS procedure  3) Continue diuretics at current doses  4) Continue lactulose and xifaxan   5) Return to clinic in 3 months     Connected with pt to check in and review plan of care. Pt had thoracentesis on 10/5 and 1.6 liters of fluid was removed. Pt reported relief of chest pressure and discomfort after thoracentesis. Pt plans to schedule an appointment weekly and cancel as needed. Pt's TIPS is rescheduled for 11/5. Pt continues to have abdominal distension and discomfort. Denied confusion and is taking lactulose and xifaxan as prescribed. Pt is titrating lactulose to achieve at least 3 BMs/day and takes an additional dose of miralax if needed. Plan to check in with pt in 2-3 weeks. Pt verbalized understanding and is agreeable to plan of care.

## 2021-10-12 LAB — PETH BLD-MCNC: NEGATIVE NG/ML

## 2021-10-22 ENCOUNTER — TELEPHONE (OUTPATIENT)
Dept: TRANSPLANT | Facility: CLINIC | Age: 51
End: 2021-10-22

## 2021-10-22 DIAGNOSIS — K75.81 LIVER CIRRHOSIS SECONDARY TO NASH (H): Primary | ICD-10-CM

## 2021-10-22 DIAGNOSIS — K74.60 LIVER CIRRHOSIS SECONDARY TO NASH (H): Primary | ICD-10-CM

## 2021-10-22 RX ORDER — CHOLECALCIFEROL (VITAMIN D3) 50 MCG
1 TABLET ORAL DAILY
Qty: 90 TABLET | Refills: 3 | Status: ON HOLD | OUTPATIENT
Start: 2021-10-22 | End: 2022-02-15

## 2021-10-22 NOTE — TELEPHONE ENCOUNTER
Incoming call from pt advising she has been having nausea and cough all day. Pt stated this could be related to her liver disease. Advised pt to go to the nearest emergency room and pt verbalized understanding.     Jeff Flowers RN on 10/22/2021 at 12:55 AM

## 2021-10-24 ENCOUNTER — TELEPHONE (OUTPATIENT)
Dept: TRANSPLANT | Facility: CLINIC | Age: 51
End: 2021-10-24

## 2021-10-24 NOTE — TELEPHONE ENCOUNTER
Susan, paged, that she went to the ER yesterday.  Pt was exhausted, nausea and short of breath.  Pt did go to [facility] due to the [facility] having divert via ambulance.    Patient did have a thora and they removed 1,000 ml. Susan is short of breath. She reports that she is  wheezing. Pt is coughing which started last evening after getting back home. Pt is flustered and does not want to wait 6 plus hours in the ER at [facility]. She reports that she has upcoming TIPS scheduled and next thora on Wednesday.   WIth how short of breath patient is patient encouraged to go to the ER. PT reports that she prefers [facility].    [facility] contacted with ETA and report.   Susan to return call with any changes or concerns.

## 2021-10-27 ENCOUNTER — DOCUMENTATION ONLY (OUTPATIENT)
Dept: LAB | Facility: CLINIC | Age: 51
End: 2021-10-27

## 2021-10-27 ENCOUNTER — TELEPHONE (OUTPATIENT)
Dept: LAB | Facility: CLINIC | Age: 51
End: 2021-10-27

## 2021-10-27 NOTE — PROGRESS NOTES
Called patient and left VM regarding no orders in her chart. Patient has a lab appointment on 10/29/2021.     Tessie Doe on 10/27/2021 at 7:53 AM

## 2021-10-27 NOTE — PROGRESS NOTES
Susan MARIO Puckett has an upcoming lab appointment:    Future Appointments   Date Time Provider Department Center   10/29/2021 10:30 AM [facility] LOW PAR   11/1/2021  1:40 PM [facility] COVID TESTING [facility]   11/5/2021  8:00 AM [facility]   1/6/2022 11:00 AM Raphael Cook MD [facility]     Patient is scheduled for the following lab(s): has lab appointment with no orders.     Patient either has no future order, or has Health Maintenance labs due. Please review and place either future orders or HMPO (Review of Health Maintenance Protocol Orders), as appropriate.    Health Maintenance Due   Topic     MICROALBUMIN      ANNUAL REVIEW OF HM ORDERS      HEPATITIS C SCREENING      A1C      LIPID      Tessie Doe

## 2021-10-28 ENCOUNTER — PATIENT OUTREACH (OUTPATIENT)
Dept: GASTROENTEROLOGY | Facility: CLINIC | Age: 51
End: 2021-10-28

## 2021-10-28 DIAGNOSIS — K74.60 LIVER CIRRHOSIS SECONDARY TO NASH (H): Primary | ICD-10-CM

## 2021-10-28 DIAGNOSIS — K75.81 LIVER CIRRHOSIS SECONDARY TO NASH (H): Primary | ICD-10-CM

## 2021-10-28 NOTE — PROGRESS NOTES
Pt had thoracentesis performed on 10/22 (950 ml of fluid removed), 10/25 (1.8 liters removed), and 10/27 (1.7 liters removed). Connected with pt for check in and pt stated that she has another thoracentesis scheduled for today, 10/28. Pt was coughing  throughout the conversation and had audible wheezing. Pt reported having increasing anxiety with shortness of breath. Pt does not think she will be able to go through the weekend without needing a thoracentesis and is wondering what she should do. Pt reported compliance with diuretics: furosemide 100 mg 2 times daily and spironolactone 250 mg daily. Advised pt to schedule a thoracentesis for tomorrow, 10/29, and 11/1. Instructed pt to go the emergency room, at any time, if shortness of breath worsens. Pt has TIPS scheduled on 11/5. Will reach out to Dr. Cook regarding pt's symptoms and follow up with any recommendations or changes to plan of care. Pt verbalized understanding and is agreeable to plan of care.

## 2021-10-29 NOTE — PROGRESS NOTES
No additional recommendations or changes of plan of care per Dr. Cook. Pt had thoracentesis appointment today, 10/29, and insufficient amount of fluid to drain found on imaging. Pt has next thoracentesis appointment scheduled on 11/1. Pt's PCP ordered a albuterol inhaler for pt to use as needed. Pt has a pre-operative exam for TIPS on 11/2 with PCP. Plan to check in with pt on 11/1 after thoracentesis appointment. Pt verbalized understanding and is agreeable to plan of care.

## 2021-11-02 ENCOUNTER — LAB (OUTPATIENT)
Dept: LAB | Facility: CLINIC | Age: 51
End: 2021-11-02
Payer: COMMERCIAL

## 2021-11-02 ENCOUNTER — LAB (OUTPATIENT)
Dept: URGENT CARE | Facility: URGENT CARE | Age: 51
End: 2021-11-02
Payer: COMMERCIAL

## 2021-11-02 DIAGNOSIS — K74.60 LIVER CIRRHOSIS SECONDARY TO NASH (H): ICD-10-CM

## 2021-11-02 DIAGNOSIS — K75.81 LIVER CIRRHOSIS SECONDARY TO NASH (H): ICD-10-CM

## 2021-11-02 DIAGNOSIS — Z11.59 ENCOUNTER FOR SCREENING FOR OTHER VIRAL DISEASES: ICD-10-CM

## 2021-11-02 LAB
ALBUMIN SERPL-MCNC: 2.7 G/DL (ref 3.4–5)
ALP SERPL-CCNC: 155 U/L (ref 40–150)
ALT SERPL W P-5'-P-CCNC: 27 U/L (ref 0–50)
ANION GAP SERPL CALCULATED.3IONS-SCNC: 1 MMOL/L (ref 3–14)
AST SERPL W P-5'-P-CCNC: 43 U/L (ref 0–45)
BILIRUB DIRECT SERPL-MCNC: 0.2 MG/DL (ref 0–0.2)
BILIRUB SERPL-MCNC: 0.7 MG/DL (ref 0.2–1.3)
BUN SERPL-MCNC: 9 MG/DL (ref 7–30)
CALCIUM SERPL-MCNC: 8.4 MG/DL (ref 8.5–10.1)
CHLORIDE BLD-SCNC: 106 MMOL/L (ref 94–109)
CO2 SERPL-SCNC: 30 MMOL/L (ref 20–32)
CREAT SERPL-MCNC: 0.89 MG/DL (ref 0.52–1.04)
ERYTHROCYTE [DISTWIDTH] IN BLOOD BY AUTOMATED COUNT: 14.5 % (ref 10–15)
GFR SERPL CREATININE-BSD FRML MDRD: 76 ML/MIN/1.73M2
GLUCOSE BLD-MCNC: 68 MG/DL (ref 70–99)
HCT VFR BLD AUTO: 34.9 % (ref 35–47)
HGB BLD-MCNC: 11.2 G/DL (ref 11.7–15.7)
INR PPP: 1.24 (ref 0.85–1.15)
MCH RBC QN AUTO: 32 PG (ref 26.5–33)
MCHC RBC AUTO-ENTMCNC: 32.1 G/DL (ref 31.5–36.5)
MCV RBC AUTO: 100 FL (ref 78–100)
PLATELET # BLD AUTO: 108 10E3/UL (ref 150–450)
POTASSIUM BLD-SCNC: 3.5 MMOL/L (ref 3.4–5.3)
PROT SERPL-MCNC: 7.2 G/DL (ref 6.8–8.8)
RBC # BLD AUTO: 3.5 10E6/UL (ref 3.8–5.2)
SODIUM SERPL-SCNC: 137 MMOL/L (ref 133–144)
WBC # BLD AUTO: 4.2 10E3/UL (ref 4–11)

## 2021-11-02 PROCEDURE — 85610 PROTHROMBIN TIME: CPT

## 2021-11-02 PROCEDURE — U0005 INFEC AGEN DETEC AMPLI PROBE: HCPCS

## 2021-11-02 PROCEDURE — 85027 COMPLETE CBC AUTOMATED: CPT

## 2021-11-02 PROCEDURE — 80053 COMPREHEN METABOLIC PANEL: CPT

## 2021-11-02 PROCEDURE — 82248 BILIRUBIN DIRECT: CPT

## 2021-11-02 PROCEDURE — U0003 INFECTIOUS AGENT DETECTION BY NUCLEIC ACID (DNA OR RNA); SEVERE ACUTE RESPIRATORY SYNDROME CORONAVIRUS 2 (SARS-COV-2) (CORONAVIRUS DISEASE [COVID-19]), AMPLIFIED PROBE TECHNIQUE, MAKING USE OF HIGH THROUGHPUT TECHNOLOGIES AS DESCRIBED BY CMS-2020-01-R: HCPCS

## 2021-11-02 PROCEDURE — 36415 COLL VENOUS BLD VENIPUNCTURE: CPT

## 2021-11-03 LAB — SARS-COV-2 RNA RESP QL NAA+PROBE: NEGATIVE

## 2021-11-03 RX ORDER — LEVOTHYROXINE SODIUM 200 UG/1
175 TABLET ORAL
Status: ON HOLD | COMMUNITY
Start: 2020-06-04 | End: 2022-02-15

## 2021-11-03 RX ORDER — METOCLOPRAMIDE 10 MG/1
10 TABLET ORAL
Status: ON HOLD | COMMUNITY
Start: 2021-08-20 | End: 2022-02-15

## 2021-11-03 RX ORDER — MULTIVIT WITH MINERALS/LUTEIN
1000 TABLET ORAL DAILY
Status: ON HOLD | COMMUNITY
Start: 2021-10-20 | End: 2022-11-10

## 2021-11-03 RX ORDER — TOPIRAMATE 50 MG/1
50 TABLET, FILM COATED ORAL DAILY
COMMUNITY
Start: 2021-10-27 | End: 2022-11-28 | Stop reason: ALTCHOICE

## 2021-11-03 RX ORDER — INSULIN ASPART 100 [IU]/ML
INJECTION, SOLUTION INTRAVENOUS; SUBCUTANEOUS
Status: ON HOLD | COMMUNITY
Start: 2021-04-26 | End: 2022-07-14

## 2021-11-03 RX ORDER — PROCHLORPERAZINE 25 MG/1
SUPPOSITORY RECTAL
Status: ON HOLD | COMMUNITY
Start: 2021-06-01 | End: 2024-03-04

## 2021-11-03 RX ORDER — FUROSEMIDE 40 MG
40 TABLET ORAL 2 TIMES DAILY
COMMUNITY
Start: 2020-08-10 | End: 2021-11-16

## 2021-11-03 RX ORDER — PEN NEEDLE, DIABETIC 32GX 5/32"
1 NEEDLE, DISPOSABLE MISCELLANEOUS
COMMUNITY
Start: 2021-06-01 | End: 2023-03-02 | Stop reason: ALTCHOICE

## 2021-11-03 RX ORDER — ALBUTEROL SULFATE 90 UG/1
1-2 AEROSOL, METERED RESPIRATORY (INHALATION)
Status: ON HOLD | COMMUNITY
Start: 2021-10-29 | End: 2022-02-15

## 2021-11-04 ENCOUNTER — ANESTHESIA EVENT (OUTPATIENT)
Dept: SURGERY | Facility: CLINIC | Age: 51
End: 2021-11-04
Payer: COMMERCIAL

## 2021-11-05 ENCOUNTER — ANESTHESIA (OUTPATIENT)
Dept: SURGERY | Facility: CLINIC | Age: 51
End: 2021-11-05
Payer: COMMERCIAL

## 2021-11-05 ENCOUNTER — HOSPITAL ENCOUNTER (OUTPATIENT)
Facility: CLINIC | Age: 51
Discharge: HOME | End: 2021-11-05
Attending: RADIOLOGY | Admitting: RADIOLOGY
Payer: COMMERCIAL

## 2021-11-05 ENCOUNTER — APPOINTMENT (OUTPATIENT)
Dept: INTERVENTIONAL RADIOLOGY/VASCULAR | Facility: CLINIC | Age: 51
End: 2021-11-05
Attending: RADIOLOGY
Payer: COMMERCIAL

## 2021-11-05 VITALS
SYSTOLIC BLOOD PRESSURE: 101 MMHG | TEMPERATURE: 97.7 F | HEART RATE: 96 BPM | DIASTOLIC BLOOD PRESSURE: 61 MMHG | WEIGHT: 162.04 LBS | OXYGEN SATURATION: 100 % | RESPIRATION RATE: 16 BRPM | HEIGHT: 66 IN | BODY MASS INDEX: 26.04 KG/M2

## 2021-11-05 DIAGNOSIS — K76.9 PLEURAL EFFUSION ASSOCIATED WITH HEPATIC DISORDER: ICD-10-CM

## 2021-11-05 DIAGNOSIS — J91.8 PLEURAL EFFUSION ASSOCIATED WITH HEPATIC DISORDER: ICD-10-CM

## 2021-11-05 DIAGNOSIS — K75.81 NASH (NONALCOHOLIC STEATOHEPATITIS): ICD-10-CM

## 2021-11-05 LAB
ABO/RH(D): NORMAL
ALBUMIN SERPL-MCNC: 1.9 G/DL (ref 3.4–5)
ALP SERPL-CCNC: 139 U/L (ref 40–150)
ALT SERPL W P-5'-P-CCNC: 26 U/L (ref 0–50)
ANION GAP SERPL CALCULATED.3IONS-SCNC: 5 MMOL/L (ref 3–14)
ANTIBODY SCREEN: NEGATIVE
AST SERPL W P-5'-P-CCNC: 52 U/L (ref 0–45)
B-HCG SERPL-ACNC: <1 IU/L (ref 0–5)
BILIRUB DIRECT SERPL-MCNC: 0.3 MG/DL (ref 0–0.2)
BILIRUB SERPL-MCNC: 0.8 MG/DL (ref 0.2–1.3)
BLD PROD TYP BPU: NORMAL
BLD PROD TYP BPU: NORMAL
BLOOD COMPONENT TYPE: NORMAL
BLOOD COMPONENT TYPE: NORMAL
BUN SERPL-MCNC: 9 MG/DL (ref 7–30)
CALCIUM SERPL-MCNC: 7.4 MG/DL (ref 8.5–10.1)
CHLORIDE BLD-SCNC: 107 MMOL/L (ref 94–109)
CO2 SERPL-SCNC: 24 MMOL/L (ref 20–32)
CODING SYSTEM: NORMAL
CODING SYSTEM: NORMAL
CREAT SERPL-MCNC: 0.77 MG/DL (ref 0.52–1.04)
CROSSMATCH: NORMAL
CROSSMATCH: NORMAL
ERYTHROCYTE [DISTWIDTH] IN BLOOD BY AUTOMATED COUNT: 14.6 % (ref 10–15)
GFR SERPL CREATININE-BSD FRML MDRD: >90 ML/MIN/1.73M2
GLUCOSE BLD-MCNC: 186 MG/DL (ref 70–99)
GLUCOSE BLDC GLUCOMTR-MCNC: 131 MG/DL (ref 70–99)
GLUCOSE BLDC GLUCOMTR-MCNC: 151 MG/DL (ref 70–99)
GLUCOSE BLDC GLUCOMTR-MCNC: 158 MG/DL (ref 70–99)
GLUCOSE BLDC GLUCOMTR-MCNC: 218 MG/DL (ref 70–99)
HCT VFR BLD AUTO: 29.8 % (ref 35–47)
HGB BLD-MCNC: 9.5 G/DL (ref 11.7–15.7)
INR PPP: 1.36 (ref 0.85–1.15)
MCH RBC QN AUTO: 32.1 PG (ref 26.5–33)
MCHC RBC AUTO-ENTMCNC: 31.9 G/DL (ref 31.5–36.5)
MCV RBC AUTO: 101 FL (ref 78–100)
PLATELET # BLD AUTO: 89 10E3/UL (ref 150–450)
POTASSIUM BLD-SCNC: 3.6 MMOL/L (ref 3.4–5.3)
PROT SERPL-MCNC: 6 G/DL (ref 6.8–8.8)
RBC # BLD AUTO: 2.96 10E6/UL (ref 3.8–5.2)
SODIUM SERPL-SCNC: 136 MMOL/L (ref 133–144)
SPECIMEN EXPIRATION DATE: NORMAL
UNIT ABO/RH: NORMAL
UNIT ABO/RH: NORMAL
UNIT NUMBER: NORMAL
UNIT NUMBER: NORMAL
UNIT STATUS: NORMAL
UNIT STATUS: NORMAL
UNIT TYPE ISBT: 6200
UNIT TYPE ISBT: 6200
WBC # BLD AUTO: 4 10E3/UL (ref 4–11)

## 2021-11-05 PROCEDURE — 999N000141 HC STATISTIC PRE-PROCEDURE NURSING ASSESSMENT

## 2021-11-05 PROCEDURE — 272N000497 HC NEEDLE CR16

## 2021-11-05 PROCEDURE — C1769 GUIDE WIRE: HCPCS

## 2021-11-05 PROCEDURE — 255N000002 HC RX 255 OP 636: Performed by: RADIOLOGY

## 2021-11-05 PROCEDURE — 710N000010 HC RECOVERY PHASE 1, LEVEL 2, PER MIN

## 2021-11-05 PROCEDURE — 370N000017 HC ANESTHESIA TECHNICAL FEE, PER MIN

## 2021-11-05 PROCEDURE — 82040 ASSAY OF SERUM ALBUMIN: CPT | Performed by: NURSE PRACTITIONER

## 2021-11-05 PROCEDURE — 85610 PROTHROMBIN TIME: CPT | Performed by: NURSE PRACTITIONER

## 2021-11-05 PROCEDURE — 272N000500 HC NEEDLE CR2

## 2021-11-05 PROCEDURE — 250N000009 HC RX 250: Performed by: NURSE ANESTHETIST, CERTIFIED REGISTERED

## 2021-11-05 PROCEDURE — 86900 BLOOD TYPING SEROLOGIC ABO: CPT | Performed by: NURSE PRACTITIONER

## 2021-11-05 PROCEDURE — 250N000011 HC RX IP 250 OP 636: Performed by: ANESTHESIOLOGY

## 2021-11-05 PROCEDURE — C1887 CATHETER, GUIDING: HCPCS

## 2021-11-05 PROCEDURE — 272N000504 HC NEEDLE CR4

## 2021-11-05 PROCEDURE — 258N000003 HC RX IP 258 OP 636: Performed by: NURSE ANESTHETIST, CERTIFIED REGISTERED

## 2021-11-05 PROCEDURE — C1874 STENT, COATED/COV W/DEL SYS: HCPCS

## 2021-11-05 PROCEDURE — 250N000011 HC RX IP 250 OP 636: Performed by: NURSE ANESTHETIST, CERTIFIED REGISTERED

## 2021-11-05 PROCEDURE — 250N000025 HC SEVOFLURANE, PER MIN

## 2021-11-05 PROCEDURE — 82962 GLUCOSE BLOOD TEST: CPT

## 2021-11-05 PROCEDURE — 250N000011 HC RX IP 250 OP 636: Performed by: NURSE PRACTITIONER

## 2021-11-05 PROCEDURE — 37182 INSERT HEPATIC SHUNT (TIPS): CPT

## 2021-11-05 PROCEDURE — 86923 COMPATIBILITY TEST ELECTRIC: CPT | Performed by: NURSE PRACTITIONER

## 2021-11-05 PROCEDURE — 36415 COLL VENOUS BLD VENIPUNCTURE: CPT | Performed by: NURSE PRACTITIONER

## 2021-11-05 PROCEDURE — 272N000302 HC DEVICE INFLATION CR5

## 2021-11-05 PROCEDURE — C1725 CATH, TRANSLUMIN NON-LASER: HCPCS

## 2021-11-05 PROCEDURE — 84702 CHORIONIC GONADOTROPIN TEST: CPT | Mod: GZ | Performed by: NURSE PRACTITIONER

## 2021-11-05 PROCEDURE — 710N000012 HC RECOVERY PHASE 2, PER MINUTE

## 2021-11-05 PROCEDURE — 272N000124 HC CATH CR11

## 2021-11-05 PROCEDURE — 80048 BASIC METABOLIC PNL TOTAL CA: CPT | Performed by: NURSE PRACTITIONER

## 2021-11-05 PROCEDURE — 37182 INSERT HEPATIC SHUNT (TIPS): CPT | Mod: GC | Performed by: RADIOLOGY

## 2021-11-05 PROCEDURE — 85027 COMPLETE CBC AUTOMATED: CPT | Performed by: NURSE PRACTITIONER

## 2021-11-05 PROCEDURE — 250N000009 HC RX 250: Performed by: RADIOLOGY

## 2021-11-05 RX ORDER — AMPICILLIN AND SULBACTAM 2; 1 G/1; G/1
3 INJECTION, POWDER, FOR SOLUTION INTRAMUSCULAR; INTRAVENOUS
Status: COMPLETED | OUTPATIENT
Start: 2021-11-05 | End: 2021-11-05

## 2021-11-05 RX ORDER — SODIUM CHLORIDE, SODIUM LACTATE, POTASSIUM CHLORIDE, CALCIUM CHLORIDE 600; 310; 30; 20 MG/100ML; MG/100ML; MG/100ML; MG/100ML
INJECTION, SOLUTION INTRAVENOUS CONTINUOUS PRN
Status: DISCONTINUED | OUTPATIENT
Start: 2021-11-05 | End: 2021-11-05

## 2021-11-05 RX ORDER — OXYCODONE HYDROCHLORIDE 5 MG/1
5-10 TABLET ORAL
Status: CANCELLED | OUTPATIENT
Start: 2021-11-05

## 2021-11-05 RX ORDER — HYDRALAZINE HYDROCHLORIDE 20 MG/ML
2.5-5 INJECTION INTRAMUSCULAR; INTRAVENOUS EVERY 10 MIN PRN
Status: DISCONTINUED | OUTPATIENT
Start: 2021-11-05 | End: 2021-11-05 | Stop reason: HOSPADM

## 2021-11-05 RX ORDER — ONDANSETRON 4 MG/1
4 TABLET, ORALLY DISINTEGRATING ORAL EVERY 30 MIN PRN
Status: DISCONTINUED | OUTPATIENT
Start: 2021-11-05 | End: 2021-11-05 | Stop reason: HOSPADM

## 2021-11-05 RX ORDER — LABETALOL HYDROCHLORIDE 5 MG/ML
5-10 INJECTION, SOLUTION INTRAVENOUS EVERY 10 MIN PRN
Status: DISCONTINUED | OUTPATIENT
Start: 2021-11-05 | End: 2021-11-05 | Stop reason: HOSPADM

## 2021-11-05 RX ORDER — IODIXANOL 320 MG/ML
100 INJECTION, SOLUTION INTRAVASCULAR ONCE
Status: COMPLETED | OUTPATIENT
Start: 2021-11-05 | End: 2021-11-05

## 2021-11-05 RX ORDER — ONDANSETRON 2 MG/ML
4 INJECTION INTRAMUSCULAR; INTRAVENOUS EVERY 30 MIN PRN
Status: DISCONTINUED | OUTPATIENT
Start: 2021-11-05 | End: 2021-11-05 | Stop reason: HOSPADM

## 2021-11-05 RX ORDER — MEPERIDINE HYDROCHLORIDE 25 MG/ML
12.5 INJECTION INTRAMUSCULAR; INTRAVENOUS; SUBCUTANEOUS
Status: DISCONTINUED | OUTPATIENT
Start: 2021-11-05 | End: 2021-11-05 | Stop reason: HOSPADM

## 2021-11-05 RX ORDER — FENTANYL CITRATE 50 UG/ML
25-50 INJECTION, SOLUTION INTRAMUSCULAR; INTRAVENOUS
Status: DISCONTINUED | OUTPATIENT
Start: 2021-11-05 | End: 2021-11-05 | Stop reason: HOSPADM

## 2021-11-05 RX ORDER — HYDROMORPHONE HYDROCHLORIDE 1 MG/ML
.2-.4 INJECTION, SOLUTION INTRAMUSCULAR; INTRAVENOUS; SUBCUTANEOUS EVERY 10 MIN PRN
Status: DISCONTINUED | OUTPATIENT
Start: 2021-11-05 | End: 2021-11-05 | Stop reason: HOSPADM

## 2021-11-05 RX ORDER — DEXAMETHASONE SODIUM PHOSPHATE 4 MG/ML
INJECTION, SOLUTION INTRA-ARTICULAR; INTRALESIONAL; INTRAMUSCULAR; INTRAVENOUS; SOFT TISSUE PRN
Status: DISCONTINUED | OUTPATIENT
Start: 2021-11-05 | End: 2021-11-05

## 2021-11-05 RX ORDER — PHENYLEPHRINE HCL IN 0.9% NACL 50MG/250ML
.1-1 PLASTIC BAG, INJECTION (ML) INTRAVENOUS CONTINUOUS
Status: DISCONTINUED | OUTPATIENT
Start: 2021-11-05 | End: 2021-11-05 | Stop reason: HOSPADM

## 2021-11-05 RX ORDER — EPHEDRINE SULFATE 50 MG/ML
INJECTION, SOLUTION INTRAMUSCULAR; INTRAVENOUS; SUBCUTANEOUS PRN
Status: DISCONTINUED | OUTPATIENT
Start: 2021-11-05 | End: 2021-11-05

## 2021-11-05 RX ORDER — SODIUM CHLORIDE, SODIUM LACTATE, POTASSIUM CHLORIDE, CALCIUM CHLORIDE 600; 310; 30; 20 MG/100ML; MG/100ML; MG/100ML; MG/100ML
INJECTION, SOLUTION INTRAVENOUS CONTINUOUS
Status: DISCONTINUED | OUTPATIENT
Start: 2021-11-05 | End: 2021-11-05 | Stop reason: HOSPADM

## 2021-11-05 RX ORDER — OXYCODONE HYDROCHLORIDE 5 MG/1
5 TABLET ORAL EVERY 4 HOURS PRN
Status: DISCONTINUED | OUTPATIENT
Start: 2021-11-05 | End: 2021-11-05 | Stop reason: HOSPADM

## 2021-11-05 RX ORDER — LIDOCAINE HYDROCHLORIDE 10 MG/ML
1-30 INJECTION, SOLUTION EPIDURAL; INFILTRATION; INTRACAUDAL; PERINEURAL
Status: COMPLETED | OUTPATIENT
Start: 2021-11-05 | End: 2021-11-05

## 2021-11-05 RX ORDER — PROPOFOL 10 MG/ML
INJECTION, EMULSION INTRAVENOUS PRN
Status: DISCONTINUED | OUTPATIENT
Start: 2021-11-05 | End: 2021-11-05

## 2021-11-05 RX ORDER — FENTANYL CITRATE 50 UG/ML
25-50 INJECTION, SOLUTION INTRAMUSCULAR; INTRAVENOUS EVERY 5 MIN PRN
Status: DISCONTINUED | OUTPATIENT
Start: 2021-11-05 | End: 2021-11-05 | Stop reason: HOSPADM

## 2021-11-05 RX ORDER — FENTANYL CITRATE 50 UG/ML
INJECTION, SOLUTION INTRAMUSCULAR; INTRAVENOUS PRN
Status: DISCONTINUED | OUTPATIENT
Start: 2021-11-05 | End: 2021-11-05

## 2021-11-05 RX ORDER — ONDANSETRON 2 MG/ML
INJECTION INTRAMUSCULAR; INTRAVENOUS PRN
Status: DISCONTINUED | OUTPATIENT
Start: 2021-11-05 | End: 2021-11-05

## 2021-11-05 RX ORDER — HEPARIN SODIUM 200 [USP'U]/100ML
1 INJECTION, SOLUTION INTRAVENOUS CONTINUOUS PRN
Status: DISCONTINUED | OUTPATIENT
Start: 2021-11-05 | End: 2021-11-05 | Stop reason: HOSPADM

## 2021-11-05 RX ADMIN — LIDOCAINE HYDROCHLORIDE 6 ML: 10 INJECTION, SOLUTION EPIDURAL; INFILTRATION; INTRACAUDAL; PERINEURAL at 09:07

## 2021-11-05 RX ADMIN — AMPICILLIN SODIUM AND SULBACTAM SODIUM 1.5 G: 2; 1 INJECTION, POWDER, FOR SOLUTION INTRAMUSCULAR; INTRAVENOUS at 10:32

## 2021-11-05 RX ADMIN — PHENYLEPHRINE HYDROCHLORIDE 100 MCG: 10 INJECTION INTRAVENOUS at 09:15

## 2021-11-05 RX ADMIN — ONDANSETRON 4 MG: 2 INJECTION INTRAMUSCULAR; INTRAVENOUS at 10:24

## 2021-11-05 RX ADMIN — PHENYLEPHRINE HYDROCHLORIDE 100 MCG: 10 INJECTION INTRAVENOUS at 08:53

## 2021-11-05 RX ADMIN — PHENYLEPHRINE HYDROCHLORIDE 100 MCG: 10 INJECTION INTRAVENOUS at 09:38

## 2021-11-05 RX ADMIN — SODIUM CHLORIDE, POTASSIUM CHLORIDE, SODIUM LACTATE AND CALCIUM CHLORIDE: 600; 310; 30; 20 INJECTION, SOLUTION INTRAVENOUS at 08:07

## 2021-11-05 RX ADMIN — PHENYLEPHRINE HYDROCHLORIDE 150 MCG: 10 INJECTION INTRAVENOUS at 09:05

## 2021-11-05 RX ADMIN — DEXAMETHASONE SODIUM PHOSPHATE 4 MG: 4 INJECTION, SOLUTION INTRA-ARTICULAR; INTRALESIONAL; INTRAMUSCULAR; INTRAVENOUS; SOFT TISSUE at 10:56

## 2021-11-05 RX ADMIN — ROCURONIUM BROMIDE 10 MG: 50 INJECTION, SOLUTION INTRAVENOUS at 09:37

## 2021-11-05 RX ADMIN — Medication 5 MG: at 09:17

## 2021-11-05 RX ADMIN — SUGAMMADEX 200 MG: 100 INJECTION, SOLUTION INTRAVENOUS at 10:49

## 2021-11-05 RX ADMIN — PROPOFOL 120 MG: 10 INJECTION, EMULSION INTRAVENOUS at 08:17

## 2021-11-05 RX ADMIN — FENTANYL CITRATE 50 MCG: 50 INJECTION, SOLUTION INTRAMUSCULAR; INTRAVENOUS at 11:34

## 2021-11-05 RX ADMIN — FENTANYL CITRATE 50 MCG: 50 INJECTION, SOLUTION INTRAMUSCULAR; INTRAVENOUS at 12:15

## 2021-11-05 RX ADMIN — PHENYLEPHRINE HYDROCHLORIDE 100 MCG: 10 INJECTION INTRAVENOUS at 10:08

## 2021-11-05 RX ADMIN — AMPICILLIN SODIUM AND SULBACTAM SODIUM 3 G: 2; 1 INJECTION, POWDER, FOR SOLUTION INTRAMUSCULAR; INTRAVENOUS at 08:32

## 2021-11-05 RX ADMIN — PHENYLEPHRINE HYDROCHLORIDE 100 MCG: 10 INJECTION INTRAVENOUS at 10:35

## 2021-11-05 RX ADMIN — Medication 5 MG: at 10:25

## 2021-11-05 RX ADMIN — ONDANSETRON 4 MG: 2 INJECTION INTRAMUSCULAR; INTRAVENOUS at 11:18

## 2021-11-05 RX ADMIN — IODIXANOL 65 ML: 320 INJECTION, SOLUTION INTRAVASCULAR at 10:48

## 2021-11-05 RX ADMIN — PHENYLEPHRINE HYDROCHLORIDE 100 MCG: 10 INJECTION INTRAVENOUS at 09:03

## 2021-11-05 RX ADMIN — MIDAZOLAM 2 MG: 1 INJECTION INTRAMUSCULAR; INTRAVENOUS at 07:59

## 2021-11-05 RX ADMIN — SODIUM CHLORIDE, POTASSIUM CHLORIDE, SODIUM LACTATE AND CALCIUM CHLORIDE: 600; 310; 30; 20 INJECTION, SOLUTION INTRAVENOUS at 10:27

## 2021-11-05 RX ADMIN — PHENYLEPHRINE HYDROCHLORIDE 100 MCG: 10 INJECTION INTRAVENOUS at 09:28

## 2021-11-05 RX ADMIN — PHENYLEPHRINE HYDROCHLORIDE 100 MCG: 10 INJECTION INTRAVENOUS at 10:17

## 2021-11-05 RX ADMIN — ROCURONIUM BROMIDE 50 MG: 50 INJECTION, SOLUTION INTRAVENOUS at 08:18

## 2021-11-05 RX ADMIN — PHENYLEPHRINE HYDROCHLORIDE 100 MCG: 10 INJECTION INTRAVENOUS at 09:17

## 2021-11-05 RX ADMIN — HEPARIN SODIUM 2 BAG: 200 INJECTION, SOLUTION INTRAVENOUS at 09:05

## 2021-11-05 RX ADMIN — PROCHLORPERAZINE EDISYLATE 10 MG: 5 INJECTION INTRAMUSCULAR; INTRAVENOUS at 11:21

## 2021-11-05 RX ADMIN — FENTANYL CITRATE 100 MCG: 50 INJECTION, SOLUTION INTRAMUSCULAR; INTRAVENOUS at 08:17

## 2021-11-05 ASSESSMENT — MIFFLIN-ST. JEOR: SCORE: 1376.75

## 2021-11-05 ASSESSMENT — COPD QUESTIONNAIRES: COPD: 0

## 2021-11-05 ASSESSMENT — ENCOUNTER SYMPTOMS: SEIZURES: 1

## 2021-11-05 ASSESSMENT — LIFESTYLE VARIABLES: TOBACCO_USE: 0

## 2021-11-05 NOTE — PROGRESS NOTES
SPIRITUAL HEALTH SERVICES  [facility] 3C   PRE-SURGERY VISIT    Had pre-surgery visit with pt and .  Provided spiritual support, prayer.   Mike Haynes M.Div (Bill)., [facility]  Staff   Pager [phone]

## 2021-11-05 NOTE — ANESTHESIA POSTPROCEDURE EVALUATION
Patient: Susan Puckett    Procedure: Procedure(s):  ANESTHESIA OUT OF OR Thoracentesis, Transjugular intrahepatic portosystemic shunt (TIPS) procedure @0800       Diagnosis:CUETO (nonalcoholic steatohepatitis) [K75.81]  Diagnosis Additional Information: No value filed.    Anesthesia Type:  General    Note:  Disposition: Outpatient   Postop Pain Control: Uneventful            Sign Out: Well controlled pain   PONV: No   Neuro/Psych: Uneventful            Sign Out: Acceptable/Baseline neuro status   Airway/Respiratory: Uneventful            Sign Out: Acceptable/Baseline resp. status   CV/Hemodynamics: Uneventful            Sign Out: Acceptable CV status; No obvious hypovolemia; No obvious fluid overload   Other NRE: NONE   DID A NON-ROUTINE EVENT OCCUR? No           Last vitals:  Vitals Value Taken Time   BP 82/54 11/05/21 1145   Temp 37.1  C (98.7  F) 11/05/21 1106   Pulse 86 11/05/21 1157   Resp 15 11/05/21 1145   SpO2 99 % 11/05/21 1157   Vitals shown include unvalidated device data.    Electronically Signed By: Darell Guardado DO  November 5, 2021  11:58 AM

## 2021-11-05 NOTE — IR NOTE
Post Dilation by 8mm x 60mm [place]    Main Portal Vein= 21  Mid Stent= 20  Hepatic Venous end of Stent= 15  Hepatic Vein [place]= 13  Right Atrium= 13    Gradient= (9)      Post Dilation by 10mm x 80mm [place]    Main Portal Vein= 22  Mid Stent= 19  Right Atrium= 15    Gradient= (7)

## 2021-11-05 NOTE — ANESTHESIA PREPROCEDURE EVALUATION
Anesthesia Pre-Procedure Evaluation    Patient: Susan Puckett   MRN: [id number] : 1970        Preoperative Diagnosis: CUETO (nonalcoholic steatohepatitis) [K75.81]    Procedure : Procedure(s):  ANESTHESIA OUT OF OR Thoracentesis, Transjugular intrahepatic portosystemic shunt (TIPS) procedure @0800          Past Medical History:   Diagnosis Date     Anemia      Anxiety      Cold sore      Depressive disorder      Diabetes (H)     Type 2 DM/No Insulin      Encephalopathy      Esophageal varices without bleeding (H)     grade I     History of blood transfusion     10/2019     History of seizure     diabetic seizure     Insomnia      Liver cirrhosis secondary to CUETO (H) 2020     Migraine      Pleural effusion      Thrombocytopenia (H)      Thyroid disease       Past Surgical History:   Procedure Laterality Date     APPENDECTOMY           COLONOSCOPY      2020 at [facility]      ENT SURGERY      tempanoplasty     GI SURGERY      EGD 2020 at [facility]      GYN SURGERY      laparoscopic ablation     MAMMOPLASTY REDUCTION BILATERAL       MT STAB PHELBECTOMY VARICOSE VEINS, LESS THAN 10 INCISIONS, ONE EXTREMITY       RNY gastric bypass  2006    retoocolic, retrogastric, [facility]     TONSILLECTOMY & ADENOIDECTOMY Bilateral      WISDOM TEETH EXTRACTION        Allergies   Allergen Reactions     Methylprednisolone Hives     Droperidol Anxiety     Nsaids Other (See Comments)     GI bleed.     Prednisone Anxiety, Hives and Rash      Social History     Tobacco Use     Smoking status: Never Smoker     Smokeless tobacco: Never Used   Substance Use Topics     Alcohol use: Not Currently     Comment: Last drink was in 2017      Wt Readings from Last 1 Encounters:   21 73.5 kg (162 lb 0.6 oz)        Anesthesia Evaluation   Pt has had prior anesthetic.     No history of anesthetic complications       ROS/MED HX  ENT/Pulmonary: Comment: R pleural effusion 2/2 CUETO cirrhosis  with frequent thoracentesis    [place] 2/2 R pleural effusions   (-) tobacco use, COPD and sleep apnea   Neurologic:     (+) seizures (summer 2021 2/2 hypoglycemia),     Cardiovascular: Comment: TTE 9/22/2021: LVEF 60%, concentric LVH; RV normal  Stress 9/2020: normal      METS/Exercise Tolerance: 3 - Able to walk 1-2 blocks without stopping    Hematologic:       Musculoskeletal:       GI/Hepatic:  - neg GI/hepatic ROS   (+) GERD, Asymptomatic on medication, liver disease (CUETO cirrhosis c/b ascites, HE, hydrothorax, EV),     Renal/Genitourinary:       Endo:     (+) type II DM, Using insulin, thyroid problem, hypothyroidism,     Psychiatric/Substance Use:       Infectious Disease: Comment: HSV on acyclovir      Malignancy:       Other:            Physical Exam    Airway        Mallampati: II       Respiratory Devices and Support         Dental  no notable dental history         Cardiovascular          Rhythm and rate: regular and normal     Pulmonary           breath sounds clear to auscultation           OUTSIDE LABS:  CBC:   Lab Results   Component Value Date    WBC 4.0 11/05/2021    WBC 4.2 11/02/2021    HGB 9.5 (L) 11/05/2021    HGB 11.2 (L) 11/02/2021    HCT 29.8 (L) 11/05/2021    HCT 34.9 (L) 11/02/2021    PLT 89 (L) 11/05/2021     (L) 11/02/2021     BMP:   Lab Results   Component Value Date     11/05/2021     11/02/2021    POTASSIUM 3.6 11/05/2021    POTASSIUM 3.5 11/02/2021    CHLORIDE 107 11/05/2021    CHLORIDE 106 11/02/2021    CO2 24 11/05/2021    CO2 30 11/02/2021    BUN 9 11/05/2021    BUN 9 11/02/2021    CR 0.77 11/05/2021    CR 0.89 11/02/2021     (H) 11/05/2021     (H) 11/05/2021     COAGS:   Lab Results   Component Value Date    PTT 32 11/22/2020    INR 1.36 (H) 11/05/2021     POC:   Lab Results   Component Value Date     (H) 02/25/2021    HCG Negative 10/25/2020    HCGS Negative 11/22/2020     HEPATIC:   Lab Results   Component Value Date    ALBUMIN 1.9 (L)  11/05/2021    PROTTOTAL 6.0 (L) 11/05/2021    ALT 26 11/05/2021    AST 52 (H) 11/05/2021    ALKPHOS 139 11/05/2021    BILITOTAL 0.8 11/05/2021    JOVANNY 60 (H) 02/25/2021     OTHER:   Lab Results   Component Value Date    LACT 2.5 (H) 11/22/2020    MAURI 7.4 (L) 11/05/2021    PHOS 3.0 12/08/2020    MAG 1.8 12/08/2020    LIPASE 14 (L) 12/06/2020    TSH 0.08 (L) 11/22/2020    T4 1.28 11/22/2020    CRP 3.7 11/22/2020       Anesthesia Plan    ASA Status:  3   NPO Status:  NPO Appropriate    Anesthesia Type: General.     - Airway: ETT   Induction: Intravenous.      Techniques and Equipment:     - Lines/Monitors: 2nd IV     Consents    Anesthesia Plan(s) and associated risks, benefits, and realistic alternatives discussed. Questions answered and patient/representative(s) expressed understanding.     - Discussed with:  Patient      - Extended Intubation/Ventilatory Support Discussed: No.      - Patient is DNR/DNI Status: No    Use of blood products discussed: Yes.     - Discussed with: Patient.     - Consented: consented to blood products            Reason for refusal: other.     Postoperative Care            Comments:                Calixto Chan MD

## 2021-11-05 NOTE — IR NOTE
Patient Name: Susan Puckett  Medical Record Number: [id number]  Today's Date: 11/5/2021    Procedure: Transjugular intrahepatic portosystemic shunt placemen  Proceduralist: Dr. Sherrie Riggs    Procedure Start: 0850  Procedure end: 1045  Sedation medications administered: general anesthesia     Report given to: PACU per anesthesia  : NA    Other Notes: Pt arrived to IR room 1 from . Consent reviewed. Pt denies any questions or concerns regarding procedure. Pt positioned supine and monitored per protocol. Pt tolerated procedure without any noted complications. Pt transferred to PACU.    Right jugular venous access site closed with  Manual pressure. Dressing CDI.

## 2021-11-05 NOTE — OR NURSING
"Dr Rivera paged at 1138:  \"PACU pt Susan Puckett needs a discharge order. also wondering if she is ok to stand/move after the 2 hours bedrest. please confirm orders and place discharge. thanks, AJAY Bateman *[phone]\"  "

## 2021-11-05 NOTE — DISCHARGE INSTRUCTIONS
1. No heavy lifting for 2 days (no greater than 10 pounds)    2. Resume usual diet    3. Can shower tomorrow    4. Dressing can come off at time of next shower    5. Over the counter pain medication as needed.     6. Interventional Radiology will call you and arrange your follow up    [facility]  Same-Day Surgery   Adult Discharge Orders & Instructions     For 24 hours after surgery    1. Get plenty of rest.  A responsible adult must stay with you for at least 24 hours after you leave the hospital.   2. Do not drive or use heavy equipment.  If you have weakness or tingling, don't drive or use heavy equipment until this feeling goes away.  3. Do not drink alcohol.  4. Avoid strenuous or risky activities.  Ask for help when climbing stairs.   5. You may feel lightheaded.  IF so, sit for a few minutes before standing.  Have someone help you get up.   6. If you have nausea (feel sick to your stomach): Drink only clear liquids such as apple juice, ginger ale, broth or 7-Up.  Rest may also help.  Be sure to drink enough fluids.  Move to a regular diet as you feel able.  7. You may have a slight fever. Call the doctor if your fever is over 100 F (37.7 C) (taken under the tongue) or lasts longer than 24 hours.  8. You may have a dry mouth, a sore throat, muscle aches or trouble sleeping.  These should go away after 24 hours.  9. Do not make important or legal decisions.   Call your doctor for any of the followin.  Signs of infection (fever, growing tenderness at the surgery site, a large amount of drainage or bleeding, severe pain, foul-smelling drainage, redness, swelling).    2. It has been over 8 to 10 hours since surgery and you are still not able to urinate (pass water).    3.  Headache for over 24 hours.    4.  Numbness, tingling or weakness the day after surgery (if you had spinal anesthesia).  To contact a doctor, call ___Dr. Raleigh Shaw [phone]________ or:         [phone] and ask for the resident on call for   ____________Interventional Radiology_______________ (answered 24 hours a day)      Emergency Department:    [facility]: [phone]       (TTY for hearing impaired: [phone])    [facility]: [phone]       (TTY for hearing impaired: [phone])

## 2021-11-05 NOTE — PROCEDURES
[facility]    Procedure: TIPS Placement    Date/Time: 11/5/2021 10:54 AM  Performed by: Paulie Balderas  Authorized by: Paulie Balderas    Fellow Physician: Sherrie  Other(s) attending procedure: [facility] - staff    UNIVERSAL PROTOCOL   Site Marked: Yes  Prior Images Obtained and Reviewed:  Yes  Required items: Required blood products, implants, devices and special equipment available    Patient identity confirmed:  Verbally with patient  Patient was reevaluated immediately before administering moderate or deep sedation or anesthesia  Confirmation Checklist:  Patient's identity using two indicators  Time out: Immediately prior to the procedure a time out was called    Universal Protocol: the Joint Commission Universal Protocol was followed    Preparation: Patient was prepped and draped in usual sterile fashion    ESBL (mL):  15         ANESTHESIA    Anesthesia: Local infiltration  Local Anesthetic:  Lidocaine 1% without epinephrine  Anesthetic Total (mL):  8      SEDATION    Patient Sedated: No    See dictated procedure note for full details.  Findings: See dictation.    Specimens: none    Complications: None    Condition: Stable    PROCEDURE   Patient Tolerance:  Patient tolerated the procedure well with no immediate complications  Describe Procedure: Successful placement of 8-10 mm x 4/2 cm Viatorr TIPS stent.   Length of time physician/provider present for 1:1 monitoring during sedation: 0

## 2021-11-05 NOTE — ANESTHESIA POSTPROCEDURE EVALUATION
Patient: Susan Puckett    Procedure: Procedure(s):  ANESTHESIA OUT OF OR Thoracentesis, Transjugular intrahepatic portosystemic shunt (TIPS) procedure @0800       Diagnosis:CUETO (nonalcoholic steatohepatitis) [K75.81]  Diagnosis Additional Information: No value filed.    Anesthesia Type:  General    Note:  Disposition: Outpatient   Postop Pain Control: Uneventful            Sign Out: Well controlled pain   PONV: No   Neuro/Psych: Uneventful            Sign Out: Acceptable/Baseline neuro status   Airway/Respiratory: Uneventful            Sign Out: Acceptable/Baseline resp. status   CV/Hemodynamics: Uneventful            Sign Out: Acceptable CV status; No obvious hypovolemia; No obvious fluid overload   Other NRE: NONE   DID A NON-ROUTINE EVENT OCCUR? No           Last vitals:  Vitals Value Taken Time   BP 86/50 11/05/21 1202   Temp 36.6  C (97.9  F) 11/05/21 1200   Pulse 86 11/05/21 1213   Resp 15 11/05/21 1200   SpO2 100 % 11/05/21 1213   Vitals shown include unvalidated device data.    Electronically Signed By: Delbert Khan MD  November 5, 2021  12:14 PM

## 2021-11-05 NOTE — ANESTHESIA CARE TRANSFER NOTE
Patient: Susan Puckett    Procedure: Procedure(s):  ANESTHESIA OUT OF OR Thoracentesis, Transjugular intrahepatic portosystemic shunt (TIPS) procedure @0800       Diagnosis: CUETO (nonalcoholic steatohepatitis) [K75.81]  Diagnosis Additional Information: No value filed.    Anesthesia Type:   General     Note:  Anesthesia Care Transfer Notewriter  Vitals:  Vitals Value Taken Time   BP 97/61 11/05/21 1110   Temp     Pulse 94 11/05/21 1112   Resp     SpO2 97 % 11/05/21 1112   Vitals shown include unvalidated device data.    Electronically Signed By: CAIN Palm CRNA  November 5, 2021  11:13 AM

## 2021-11-05 NOTE — ANESTHESIA PROCEDURE NOTES
Airway       Patient location during procedure: OR       Procedure Start/Stop Times: 11/5/2021 8:20 AM  Staff -        CRNA: Michell Whitfield APRN CRNA       Other Anesthesia Staff: Yoandy Morris       Performed By: SRNA  Consent for Airway        Urgency: elective  Indications and Patient Condition       Indications for airway management: zelda-procedural       Induction type:intravenous       Mask difficulty assessment: 1 - vent by mask    Final Airway Details       Final airway type: endotracheal airway       Successful airway: ETT - single  Endotracheal Airway Details        ETT size (mm): 7.0       Cuffed: yes       Successful intubation technique: direct laryngoscopy       DL Blade Type: Fuchs 2       Grade View of Cords: 1 (cords open and clear)       Adjucts: stylet       Position: Left       Measured from: lips       Secured at (cm): 21    Post intubation assessment        Placement verified by: capnometry, equal breath sounds and chest rise        Number of attempts at approach: 1       Secured with: pink tape       Ease of procedure: easy       Dentition: Intact and Unchanged

## 2021-11-06 ENCOUNTER — APPOINTMENT (OUTPATIENT)
Dept: CT IMAGING | Facility: CLINIC | Age: 51
End: 2021-11-06
Attending: EMERGENCY MEDICINE
Payer: COMMERCIAL

## 2021-11-06 ENCOUNTER — HOSPITAL ENCOUNTER (OUTPATIENT)
Facility: CLINIC | Age: 51
Setting detail: OBSERVATION
Discharge: HOME | End: 2021-11-08
Attending: EMERGENCY MEDICINE | Admitting: STUDENT IN AN ORGANIZED HEALTH CARE EDUCATION/TRAINING PROGRAM
Payer: COMMERCIAL

## 2021-11-06 ENCOUNTER — APPOINTMENT (OUTPATIENT)
Dept: ULTRASOUND IMAGING | Facility: CLINIC | Age: 51
End: 2021-11-06
Attending: EMERGENCY MEDICINE
Payer: COMMERCIAL

## 2021-11-06 DIAGNOSIS — R73.9 HYPERGLYCEMIA: ICD-10-CM

## 2021-11-06 DIAGNOSIS — R52 PAIN: ICD-10-CM

## 2021-11-06 DIAGNOSIS — R10.13 ABDOMINAL PAIN, EPIGASTRIC: ICD-10-CM

## 2021-11-06 DIAGNOSIS — Z20.822 LAB TEST NEGATIVE FOR COVID-19 VIRUS: ICD-10-CM

## 2021-11-06 DIAGNOSIS — R11.0 NAUSEA: Primary | ICD-10-CM

## 2021-11-06 DIAGNOSIS — K74.60 CIRRHOSIS OF LIVER WITHOUT ASCITES, UNSPECIFIED HEPATIC CIRRHOSIS TYPE (H): ICD-10-CM

## 2021-11-06 DIAGNOSIS — E11.9 TYPE 2 DIABETES MELLITUS WITHOUT COMPLICATION, WITHOUT LONG-TERM CURRENT USE OF INSULIN (H): ICD-10-CM

## 2021-11-06 LAB
ALBUMIN SERPL-MCNC: 2 G/DL (ref 3.4–5)
ALBUMIN UR-MCNC: 10 MG/DL
ALP SERPL-CCNC: 161 U/L (ref 40–150)
ALT SERPL W P-5'-P-CCNC: 83 U/L (ref 0–50)
AMMONIA PLAS-SCNC: 26 UMOL/L (ref 10–50)
ANION GAP SERPL CALCULATED.3IONS-SCNC: 6 MMOL/L (ref 3–14)
APPEARANCE UR: CLEAR
AST SERPL W P-5'-P-CCNC: 197 U/L (ref 0–45)
BASOPHILS # BLD AUTO: 0 10E3/UL (ref 0–0.2)
BASOPHILS NFR BLD AUTO: 0 %
BILIRUB SERPL-MCNC: 1.4 MG/DL (ref 0.2–1.3)
BILIRUB UR QL STRIP: NEGATIVE
BUN SERPL-MCNC: 9 MG/DL (ref 7–30)
CALCIUM SERPL-MCNC: 7.7 MG/DL (ref 8.5–10.1)
CHLORIDE BLD-SCNC: 101 MMOL/L (ref 94–109)
CO2 SERPL-SCNC: 28 MMOL/L (ref 20–32)
COLOR UR AUTO: ABNORMAL
CREAT SERPL-MCNC: 0.86 MG/DL (ref 0.52–1.04)
EOSINOPHIL # BLD AUTO: 0 10E3/UL (ref 0–0.7)
EOSINOPHIL NFR BLD AUTO: 0 %
ERYTHROCYTE [DISTWIDTH] IN BLOOD BY AUTOMATED COUNT: 15.1 % (ref 10–15)
GFR SERPL CREATININE-BSD FRML MDRD: 80 ML/MIN/1.73M2
GLUCOSE BLD-MCNC: 260 MG/DL (ref 70–99)
GLUCOSE BLDC GLUCOMTR-MCNC: 156 MG/DL (ref 70–99)
GLUCOSE BLDC GLUCOMTR-MCNC: 163 MG/DL (ref 70–99)
GLUCOSE BLDC GLUCOMTR-MCNC: 239 MG/DL (ref 70–99)
GLUCOSE UR STRIP-MCNC: NEGATIVE MG/DL
HCO3 BLDV-SCNC: 27 MMOL/L (ref 21–28)
HCT VFR BLD AUTO: 30.3 % (ref 35–47)
HGB BLD-MCNC: 9.9 G/DL (ref 11.7–15.7)
HGB UR QL STRIP: NEGATIVE
IMM GRANULOCYTES # BLD: 0.1 10E3/UL
IMM GRANULOCYTES NFR BLD: 1 %
INR PPP: 1.61 (ref 0.85–1.15)
KETONES BLD-SCNC: 0.1 MMOL/L (ref 0–0.6)
KETONES UR STRIP-MCNC: NEGATIVE MG/DL
LACTATE BLD-SCNC: 2.3 MMOL/L
LEUKOCYTE ESTERASE UR QL STRIP: NEGATIVE
LIPASE SERPL-CCNC: 14 U/L (ref 73–393)
LYMPHOCYTES # BLD AUTO: 1.4 10E3/UL (ref 0.8–5.3)
LYMPHOCYTES NFR BLD AUTO: 16 %
MAGNESIUM SERPL-MCNC: 1.8 MG/DL (ref 1.6–2.3)
MCH RBC QN AUTO: 32.5 PG (ref 26.5–33)
MCHC RBC AUTO-ENTMCNC: 32.7 G/DL (ref 31.5–36.5)
MCV RBC AUTO: 99 FL (ref 78–100)
MONOCYTES # BLD AUTO: 0.6 10E3/UL (ref 0–1.3)
MONOCYTES NFR BLD AUTO: 7 %
NEUTROPHILS # BLD AUTO: 6.4 10E3/UL (ref 1.6–8.3)
NEUTROPHILS NFR BLD AUTO: 76 %
NITRATE UR QL: NEGATIVE
NRBC # BLD AUTO: 0 10E3/UL
NRBC BLD AUTO-RTO: 0 /100
PCO2 BLDV: 41 MM HG (ref 40–50)
PH BLDV: 7.44 [PH] (ref 7.32–7.43)
PH UR STRIP: 6 [PH] (ref 5–7)
PLATELET # BLD AUTO: 101 10E3/UL (ref 150–450)
PO2 BLDV: 31 MM HG (ref 25–47)
POTASSIUM BLD-SCNC: 3.7 MMOL/L (ref 3.4–5.3)
POTASSIUM BLD-SCNC: 3.8 MMOL/L (ref 3.4–5.3)
PROT SERPL-MCNC: 6.2 G/DL (ref 6.8–8.8)
RBC # BLD AUTO: 3.05 10E6/UL (ref 3.8–5.2)
RBC URINE: 1 /HPF
SAO2 % BLDV: 62 % (ref 94–100)
SARS-COV-2 RNA RESP QL NAA+PROBE: NEGATIVE
SODIUM SERPL-SCNC: 135 MMOL/L (ref 133–144)
SP GR UR STRIP: 1.01 (ref 1–1.03)
SQUAMOUS EPITHELIAL: 2 /HPF
UROBILINOGEN UR STRIP-MCNC: NORMAL MG/DL
WBC # BLD AUTO: 8.5 10E3/UL (ref 4–11)
WBC URINE: <1 /HPF

## 2021-11-06 PROCEDURE — 96375 TX/PRO/DX INJ NEW DRUG ADDON: CPT | Performed by: EMERGENCY MEDICINE

## 2021-11-06 PROCEDURE — 250N000011 HC RX IP 250 OP 636: Performed by: EMERGENCY MEDICINE

## 2021-11-06 PROCEDURE — 74177 CT ABD & PELVIS W/CONTRAST: CPT

## 2021-11-06 PROCEDURE — 96372 THER/PROPH/DIAG INJ SC/IM: CPT | Performed by: EMERGENCY MEDICINE

## 2021-11-06 PROCEDURE — 84132 ASSAY OF SERUM POTASSIUM: CPT

## 2021-11-06 PROCEDURE — 99285 EMERGENCY DEPT VISIT HI MDM: CPT | Mod: 25 | Performed by: EMERGENCY MEDICINE

## 2021-11-06 PROCEDURE — 250N000011 HC RX IP 250 OP 636

## 2021-11-06 PROCEDURE — 250N000013 HC RX MED GY IP 250 OP 250 PS 637

## 2021-11-06 PROCEDURE — 96374 THER/PROPH/DIAG INJ IV PUSH: CPT | Mod: 59 | Performed by: EMERGENCY MEDICINE

## 2021-11-06 PROCEDURE — 83735 ASSAY OF MAGNESIUM: CPT

## 2021-11-06 PROCEDURE — 250N000013 HC RX MED GY IP 250 OP 250 PS 637: Performed by: STUDENT IN AN ORGANIZED HEALTH CARE EDUCATION/TRAINING PROGRAM

## 2021-11-06 PROCEDURE — U0003 INFECTIOUS AGENT DETECTION BY NUCLEIC ACID (DNA OR RNA); SEVERE ACUTE RESPIRATORY SYNDROME CORONAVIRUS 2 (SARS-COV-2) (CORONAVIRUS DISEASE [COVID-19]), AMPLIFIED PROBE TECHNIQUE, MAKING USE OF HIGH THROUGHPUT TECHNOLOGIES AS DESCRIBED BY CMS-2020-01-R: HCPCS | Performed by: EMERGENCY MEDICINE

## 2021-11-06 PROCEDURE — 93975 VASCULAR STUDY: CPT

## 2021-11-06 PROCEDURE — 80053 COMPREHEN METABOLIC PANEL: CPT | Performed by: EMERGENCY MEDICINE

## 2021-11-06 PROCEDURE — G0378 HOSPITAL OBSERVATION PER HR: HCPCS

## 2021-11-06 PROCEDURE — 74177 CT ABD & PELVIS W/CONTRAST: CPT | Mod: 26 | Performed by: RADIOLOGY

## 2021-11-06 PROCEDURE — 36415 COLL VENOUS BLD VENIPUNCTURE: CPT | Performed by: EMERGENCY MEDICINE

## 2021-11-06 PROCEDURE — 82140 ASSAY OF AMMONIA: CPT | Performed by: EMERGENCY MEDICINE

## 2021-11-06 PROCEDURE — 99285 EMERGENCY DEPT VISIT HI MDM: CPT | Performed by: EMERGENCY MEDICINE

## 2021-11-06 PROCEDURE — C9803 HOPD COVID-19 SPEC COLLECT: HCPCS | Performed by: EMERGENCY MEDICINE

## 2021-11-06 PROCEDURE — 82803 BLOOD GASES ANY COMBINATION: CPT

## 2021-11-06 PROCEDURE — 82962 GLUCOSE BLOOD TEST: CPT

## 2021-11-06 PROCEDURE — 93975 VASCULAR STUDY: CPT | Mod: 26 | Performed by: RADIOLOGY

## 2021-11-06 PROCEDURE — 83690 ASSAY OF LIPASE: CPT | Performed by: EMERGENCY MEDICINE

## 2021-11-06 PROCEDURE — 87040 BLOOD CULTURE FOR BACTERIA: CPT

## 2021-11-06 PROCEDURE — 99220 PR INITIAL OBSERVATION CARE,LEVEL III: CPT | Mod: GC | Performed by: STUDENT IN AN ORGANIZED HEALTH CARE EDUCATION/TRAINING PROGRAM

## 2021-11-06 PROCEDURE — 87086 URINE CULTURE/COLONY COUNT: CPT

## 2021-11-06 PROCEDURE — 96375 TX/PRO/DX INJ NEW DRUG ADDON: CPT

## 2021-11-06 PROCEDURE — 36415 COLL VENOUS BLD VENIPUNCTURE: CPT

## 2021-11-06 PROCEDURE — 82010 KETONE BODYS QUAN: CPT | Performed by: EMERGENCY MEDICINE

## 2021-11-06 PROCEDURE — 96376 TX/PRO/DX INJ SAME DRUG ADON: CPT

## 2021-11-06 PROCEDURE — 85610 PROTHROMBIN TIME: CPT | Performed by: EMERGENCY MEDICINE

## 2021-11-06 PROCEDURE — 96361 HYDRATE IV INFUSION ADD-ON: CPT | Performed by: EMERGENCY MEDICINE

## 2021-11-06 PROCEDURE — 81001 URINALYSIS AUTO W/SCOPE: CPT | Performed by: EMERGENCY MEDICINE

## 2021-11-06 PROCEDURE — 250N000012 HC RX MED GY IP 250 OP 636 PS 637: Performed by: EMERGENCY MEDICINE

## 2021-11-06 PROCEDURE — 85025 COMPLETE CBC W/AUTO DIFF WBC: CPT | Performed by: EMERGENCY MEDICINE

## 2021-11-06 PROCEDURE — 258N000003 HC RX IP 258 OP 636: Performed by: EMERGENCY MEDICINE

## 2021-11-06 PROCEDURE — 999N000127 HC STATISTIC PERIPHERAL IV START W US GUIDANCE

## 2021-11-06 RX ORDER — TRAZODONE HYDROCHLORIDE 50 MG/1
50-150 TABLET, FILM COATED ORAL
Status: DISCONTINUED | OUTPATIENT
Start: 2021-11-06 | End: 2021-11-08 | Stop reason: HOSPADM

## 2021-11-06 RX ORDER — ATORVASTATIN CALCIUM 20 MG/1
20 TABLET, FILM COATED ORAL EVERY MORNING
Status: DISCONTINUED | OUTPATIENT
Start: 2021-11-07 | End: 2021-11-08 | Stop reason: HOSPADM

## 2021-11-06 RX ORDER — NALOXONE HYDROCHLORIDE 0.4 MG/ML
0.4 INJECTION, SOLUTION INTRAMUSCULAR; INTRAVENOUS; SUBCUTANEOUS
Status: DISCONTINUED | OUTPATIENT
Start: 2021-11-06 | End: 2021-11-08 | Stop reason: HOSPADM

## 2021-11-06 RX ORDER — FOLIC ACID 1 MG/1
1 TABLET ORAL EVERY MORNING
Status: DISCONTINUED | OUTPATIENT
Start: 2021-11-07 | End: 2021-11-08 | Stop reason: HOSPADM

## 2021-11-06 RX ORDER — LACTULOSE 10 G/15ML
20 SOLUTION ORAL
Status: DISCONTINUED | OUTPATIENT
Start: 2021-11-06 | End: 2021-11-08 | Stop reason: HOSPADM

## 2021-11-06 RX ORDER — LACTULOSE 10 G/15ML
100 SOLUTION ORAL
Status: DISCONTINUED | OUTPATIENT
Start: 2021-11-06 | End: 2021-11-08 | Stop reason: HOSPADM

## 2021-11-06 RX ORDER — PANTOPRAZOLE SODIUM 40 MG/1
40 TABLET, DELAYED RELEASE ORAL DAILY
Status: DISCONTINUED | OUTPATIENT
Start: 2021-11-07 | End: 2021-11-08 | Stop reason: HOSPADM

## 2021-11-06 RX ORDER — OXYCODONE HYDROCHLORIDE 5 MG/1
5 TABLET ORAL EVERY 6 HOURS PRN
Status: DISCONTINUED | OUTPATIENT
Start: 2021-11-06 | End: 2021-11-08 | Stop reason: HOSPADM

## 2021-11-06 RX ORDER — ONDANSETRON 4 MG/1
4 TABLET, ORALLY DISINTEGRATING ORAL EVERY 6 HOURS PRN
Status: DISCONTINUED | OUTPATIENT
Start: 2021-11-06 | End: 2021-11-06

## 2021-11-06 RX ORDER — ONDANSETRON 2 MG/ML
4 INJECTION INTRAMUSCULAR; INTRAVENOUS EVERY 6 HOURS PRN
Status: DISCONTINUED | OUTPATIENT
Start: 2021-11-06 | End: 2021-11-08 | Stop reason: HOSPADM

## 2021-11-06 RX ORDER — HYDROXYZINE HYDROCHLORIDE 25 MG/1
25 TABLET, FILM COATED ORAL 3 TIMES DAILY PRN
Status: DISCONTINUED | OUTPATIENT
Start: 2021-11-06 | End: 2021-11-08 | Stop reason: HOSPADM

## 2021-11-06 RX ORDER — VENLAFAXINE 75 MG/1
75 TABLET ORAL AT BEDTIME
Status: DISCONTINUED | OUTPATIENT
Start: 2021-11-06 | End: 2021-11-06 | Stop reason: CLARIF

## 2021-11-06 RX ORDER — NICOTINE POLACRILEX 4 MG
15-30 LOZENGE BUCCAL
Status: DISCONTINUED | OUTPATIENT
Start: 2021-11-06 | End: 2021-11-08 | Stop reason: HOSPADM

## 2021-11-06 RX ORDER — DEXTROSE MONOHYDRATE 25 G/50ML
25-50 INJECTION, SOLUTION INTRAVENOUS
Status: DISCONTINUED | OUTPATIENT
Start: 2021-11-06 | End: 2021-11-08 | Stop reason: HOSPADM

## 2021-11-06 RX ORDER — HYDROMORPHONE HYDROCHLORIDE 1 MG/ML
0.5 INJECTION, SOLUTION INTRAMUSCULAR; INTRAVENOUS; SUBCUTANEOUS ONCE
Status: COMPLETED | OUTPATIENT
Start: 2021-11-06 | End: 2021-11-06

## 2021-11-06 RX ORDER — POLYETHYLENE GLYCOL 3350 17 G/17G
17 POWDER, FOR SOLUTION ORAL DAILY PRN
Status: DISCONTINUED | OUTPATIENT
Start: 2021-11-06 | End: 2021-11-08 | Stop reason: HOSPADM

## 2021-11-06 RX ORDER — NALOXONE HYDROCHLORIDE 0.4 MG/ML
0.2 INJECTION, SOLUTION INTRAMUSCULAR; INTRAVENOUS; SUBCUTANEOUS
Status: DISCONTINUED | OUTPATIENT
Start: 2021-11-06 | End: 2021-11-08 | Stop reason: HOSPADM

## 2021-11-06 RX ORDER — IOPAMIDOL 755 MG/ML
99 INJECTION, SOLUTION INTRAVASCULAR ONCE
Status: COMPLETED | OUTPATIENT
Start: 2021-11-06 | End: 2021-11-06

## 2021-11-06 RX ORDER — LEVOTHYROXINE SODIUM 100 UG/1
200 TABLET ORAL DAILY
Status: DISCONTINUED | OUTPATIENT
Start: 2021-11-07 | End: 2021-11-08 | Stop reason: HOSPADM

## 2021-11-06 RX ORDER — METOCLOPRAMIDE 10 MG/1
10 TABLET ORAL EVERY 6 HOURS PRN
Status: DISCONTINUED | OUTPATIENT
Start: 2021-11-06 | End: 2021-11-08 | Stop reason: HOSPADM

## 2021-11-06 RX ORDER — ONDANSETRON 2 MG/ML
4 INJECTION INTRAMUSCULAR; INTRAVENOUS ONCE
Status: COMPLETED | OUTPATIENT
Start: 2021-11-06 | End: 2021-11-06

## 2021-11-06 RX ORDER — ACETAMINOPHEN 500 MG
500-1000 TABLET ORAL EVERY 6 HOURS PRN
Status: ON HOLD | COMMUNITY
End: 2021-11-08

## 2021-11-06 RX ORDER — KETOROLAC TROMETHAMINE 30 MG/ML
30 INJECTION, SOLUTION INTRAMUSCULAR; INTRAVENOUS EVERY 6 HOURS PRN
Status: DISCONTINUED | OUTPATIENT
Start: 2021-11-06 | End: 2021-11-08 | Stop reason: HOSPADM

## 2021-11-06 RX ORDER — VENLAFAXINE HYDROCHLORIDE 75 MG/1
75 CAPSULE, EXTENDED RELEASE ORAL AT BEDTIME
Status: DISCONTINUED | OUTPATIENT
Start: 2021-11-06 | End: 2021-11-08 | Stop reason: HOSPADM

## 2021-11-06 RX ORDER — ONDANSETRON 4 MG/1
4 TABLET, ORALLY DISINTEGRATING ORAL EVERY 6 HOURS PRN
Status: DISCONTINUED | OUTPATIENT
Start: 2021-11-06 | End: 2021-11-08 | Stop reason: HOSPADM

## 2021-11-06 RX ORDER — ALBUTEROL SULFATE 90 UG/1
1-2 AEROSOL, METERED RESPIRATORY (INHALATION)
Status: DISCONTINUED | OUTPATIENT
Start: 2021-11-06 | End: 2021-11-08 | Stop reason: HOSPADM

## 2021-11-06 RX ADMIN — OXYCODONE HYDROCHLORIDE 5 MG: 5 TABLET ORAL at 22:54

## 2021-11-06 RX ADMIN — INSULIN ASPART 2 UNITS: 100 INJECTION, SOLUTION INTRAVENOUS; SUBCUTANEOUS at 15:38

## 2021-11-06 RX ADMIN — SODIUM CHLORIDE 500 ML: 9 INJECTION, SOLUTION INTRAVENOUS at 15:07

## 2021-11-06 RX ADMIN — ONDANSETRON 4 MG: 2 INJECTION INTRAMUSCULAR; INTRAVENOUS at 15:06

## 2021-11-06 RX ADMIN — IOPAMIDOL 99 ML: 755 INJECTION, SOLUTION INTRAVENOUS at 15:47

## 2021-11-06 RX ADMIN — ONDANSETRON 4 MG: 2 INJECTION INTRAMUSCULAR; INTRAVENOUS at 21:44

## 2021-11-06 RX ADMIN — HYDROMORPHONE HYDROCHLORIDE 0.5 MG: 1 INJECTION, SOLUTION INTRAMUSCULAR; INTRAVENOUS; SUBCUTANEOUS at 15:07

## 2021-11-06 RX ADMIN — RIFAXIMIN 550 MG: 550 TABLET ORAL at 21:58

## 2021-11-06 RX ADMIN — VENLAFAXINE HYDROCHLORIDE 75 MG: 75 CAPSULE, EXTENDED RELEASE ORAL at 22:54

## 2021-11-06 RX ADMIN — TRAZODONE HYDROCHLORIDE 100 MG: 50 TABLET ORAL at 22:54

## 2021-11-06 RX ADMIN — HYDROXYZINE HYDROCHLORIDE 25 MG: 25 TABLET, FILM COATED ORAL at 21:58

## 2021-11-06 RX ADMIN — PROCHLORPERAZINE EDISYLATE 5 MG: 5 INJECTION INTRAMUSCULAR; INTRAVENOUS at 22:54

## 2021-11-06 ASSESSMENT — ACTIVITIES OF DAILY LIVING (ADL)
ADLS_ACUITY_SCORE: 9
ADLS_ACUITY_SCORE: 9

## 2021-11-06 ASSESSMENT — ENCOUNTER SYMPTOMS
ABDOMINAL DISTENTION: 1
BLOOD IN STOOL: 0
NAUSEA: 1
ABDOMINAL PAIN: 1

## 2021-11-06 ASSESSMENT — MIFFLIN-ST. JEOR
SCORE: 1375.45
SCORE: 1394.95

## 2021-11-06 NOTE — ED TRIAGE NOTES
Pt presents ambulatory to triage with increased nausea, dry heaves, abd pain, back pain, anxiety, and elevated BGs.  Checks BGs at home and have been consistently in 300s.  Poor PO intake.  Had TIPS done yesterday here

## 2021-11-06 NOTE — ED PROVIDER NOTES
ED Provider Note  [facility]      History     Chief Complaint   Patient presents with     Nausea & Vomiting     Abdominal Pain     Hyperglycemia     The history is provided by the patient and medical records.     Susan Puckett is a 51 year old female with a past medical history of TIPS (11/5/21), type II diabetes, iron deficiency anemia, thrombocytopenia, and liver cirrhosis secondary to CUETO who presents to the ED with abdominal pain, back pain, and nausea. She reports that her blood glucose has been in the 300-400s. She reports pain in the right lower quadrant and back. She states that today she ate sprite, a banana, and a Danon smoothie. She endorses anxiety that she attributes to the pain and nausea she feels. Patient denies hematemesis or bloody stools.  She denies swelling in the feet, hands, or arms. She reports that she takes Januvia. She reports that she took no insulin today or yesterday. She reports that she took levothyroxine and cholesterol meds and otc heartburn medications yesterday but did not take her other meds. She has not taken her medications today.         Past Medical History  Past Medical History:   Diagnosis Date     Anemia      Anxiety      Cold sore      Depressive disorder      Diabetes (H)     Type 2 DM/No Insulin      Encephalopathy      Esophageal varices without bleeding (H)     grade I     History of blood transfusion     10/2019     History of seizure     diabetic seizure     Insomnia      Liver cirrhosis secondary to CUETO (H) 05/28/2020     Migraine      Pleural effusion      Thrombocytopenia (H)      Thyroid disease      Past Surgical History:   Procedure Laterality Date     APPENDECTOMY      1989     COLONOSCOPY      1/2020 at [facility]      ENT SURGERY  1998    tempanoplasty     GI SURGERY      EGD August 2020 at [facility]      GYN SURGERY  2010    laparoscopic ablation     IR TRANSVEN INTRAHEPATIC PORTOSYST SHUNT  11/5/2021     MAMMOPLASTY  REDUCTION BILATERAL  2004     [place] STAB PHELBECTOMY VARICOSE VEINS, LESS THAN 10 INCISIONS, ONE EXTREMITY  2020     RNY gastric bypass  01/2006    retoocolic, retrogastric, [facility]     TONSILLECTOMY & ADENOIDECTOMY Bilateral 1978     WISDOM TEETH EXTRACTION       acetone urine (KETOSTIX) test strip  albuterol (PROAIR HFA/PROVENTIL HFA/VENTOLIN HFA) 108 (90 Base) MCG/ACT inhaler  amylase-lipase-protease (CREON 6) 8097-05333-44921 units CPEP  atorvastatin (LIPITOR) 20 MG tablet  calcium carbonate (TUMS) 500 MG chewable tablet  calcium citrate-vitamin D (CITRACAL W/D) 250-100 MG-UNIT tablet  Continuous Blood Gluc  (DEXCOM G6 ) LUNA  Continuous Blood Gluc Sensor (DEXCOM G6 SENSOR) MISC  Continuous Blood Gluc Transmit (DEXCOM G6 TRANSMITTER) MISC  cyanocobalamin (VITAMIN B-12) 1000 MCG tablet  Ferrous Sulfate (IRON) 325 (65 FE) MG tablet  folic acid (FOLVITE) 1 MG tablet  furosemide (LASIX) 40 MG tablet  hydrOXYzine (ATARAX) 25 MG tablet  insulin aspart (NOVOLOG FLEXPEN) 100 UNIT/ML pen  insulin glargine (LANTUS PEN) 100 UNIT/ML pen  insulin pen needle (BD GRISEL U/F) 32G X 4 MM miscellaneous  lactulose (CHRONULAC) 10 GM/15ML solution  levothyroxine (SYNTHROID/LEVOTHROID) 200 MCG tablet  metoclopramide (REGLAN) 10 MG tablet  Multiple Vitamin (MULTIVITAMIN PO)  multivitamin (DEKAS ESSENTIAL) capsule  omeprazole (PRILOSEC) 20 MG DR capsule  ondansetron (ZOFRAN-ODT) 4 MG ODT tab  polyethylene glycol (MIRALAX) 17 g packet  rifampin (RIFADIN) 300 MG capsule  rifaximin (XIFAXAN) 550 MG TABS tablet  Rimegepant Sulfate 75 MG TBDP  simethicone (MYLICON) 80 MG chewable tablet  sitagliptin (JANUVIA) 100 MG tablet  spironolactone (ALDACTONE) 100 MG tablet  SUMAtriptan (IMITREX) 50 MG tablet  topiramate (TOPAMAX) 50 MG tablet  traZODone (DESYREL) 100 MG tablet  valACYclovir (VALTREX) 1000 mg tablet  venlafaxine (EFFEXOR) 75 MG tablet  vitamin A 3 MG (37849 UNITS) capsule  vitamin C (ASCORBIC ACID) 1000 MG  "TABS  vitamin D3 (CHOLECALCIFEROL) 50 mcg (2000 units) tablet  vitamin D3 (CHOLECALCIFEROL) 50 mcg (2000 units) tablet  vitamin E (TOCOPHEROL) 400 units (180 mg) capsule  Vitamin K, Phytonadione, 100 MCG TABS      Allergies   Allergen Reactions     Methylprednisolone Hives     Droperidol Anxiety     Nsaids Other (See Comments)     GI bleed.     Prednisone Anxiety, Hives and Rash     Family History  Family History   Problem Relation Age of Onset     Anesthesia Reaction Nephew         PONV     Bleeding Disorder No family hx of      Clotting Disorder No family hx of      Social History   Social History     Tobacco Use     Smoking status: Never Smoker     Smokeless tobacco: Never Used   Substance Use Topics     Alcohol use: Not Currently     Comment: Last drink was in 2017     Drug use: Never      Past medical history, past surgical history, medications, allergies, family history, and social history were reviewed with the patient. No additional pertinent items.       Review of Systems   Constitutional: Positive for chills. Negative for fever.   HENT: Negative for congestion, rhinorrhea and sore throat.    Respiratory: Negative for cough and shortness of breath.    Gastrointestinal: Positive for abdominal distention, abdominal pain and nausea. Negative for blood in stool.   Allergic/Immunologic: Negative for immunocompromised state.   Hematological: Bruises/bleeds easily.     A complete review of systems was performed with pertinent positives and negatives noted in the HPI, and all other systems negative.    Physical Exam   BP: 109/78  Pulse: 85  Temp: 97.8  F (36.6  C)  Resp: 16  Height: 168.9 cm (5' 6.5\")  Weight: 72.6 kg (160 lb)  SpO2: 100 %  Physical Exam  Vitals and nursing note reviewed.       Gen:A&Ox3, tearful, uncomfortable.   HEENT:PERRL, no facial tenderness or wounds, head atraumatic, oropharynx clear, mucous membranes moist, TMs clear bilaterally  Neck:no bony tenderness or step offs, no JVD, trachea " midline  Back: no CVA tenderness, no midline bony tenderness  CV:RRR without murmurs  PULM: decreased in right base  Abd:soft, mild distention, tender in epigastrium and RUQ   UE:No traumatic injuries, skin normal  LE:no traumatic injuries, skin normal, no LE edema  Neuro:CN II-XII intact, strength 5/5 throughout  Skin: no rashes or ecchymoses    ED Course     2:48 PM  The patient was seen and examined by Dr. Yoder in Room 12.     Procedures         Results for orders placed or performed during the hospital encounter of 11/06/21   US TIPSS Doppler     Status: None (Preliminary result)    Impression    RESIDENT PRELIMINARY INTERPRETATION  Impression:   1. Patent TIPS without hemodynamically significant stenosis.  2. Antegrade flow in the main portal vein with retrograde flow in the  right portal vein. The left portal vein is not visualized, however was  patent on the same day CT. This is likely technical.  3. Cirrhotic configuration of the liver with evidence of portal  hypertension including splenomegaly.  4. Again seen is diffuse wall thickening of the gallbladder wall  without additional evidence of cholecystitis, likely reactive.   CT Abdomen Pelvis w Contrast     Status: None (Preliminary result)    Impression    RESIDENT PRELIMINARY INTERPRETATION  IMPRESSION:   1. Slightly increased wall thickening of the cecum, rectosigmoid  colon, and gallbladder likely representing worsening edema in setting  of ascites, right pleural effusion, and soft tissue edema. Infection  is possible but less likely given normal white count.   2. Cirrhotic liver with patent TIPS.   3. Colonic diverticulosis without evidence for acute diverticulitis.   4. Unremarkable sequelae gastric bypass with Faustino-en-Y reconstruction.  No evidence of obstruction.   Comprehensive metabolic panel     Status: Abnormal   Result Value Ref Range    Sodium 135 133 - 144 mmol/L    Potassium 3.7 3.4 - 5.3 mmol/L    Chloride 101 94 - 109 mmol/L    Carbon  Dioxide (CO2) 28 20 - 32 mmol/L    Anion Gap 6 3 - 14 mmol/L    Urea Nitrogen 9 7 - 30 mg/dL    Creatinine 0.86 0.52 - 1.04 mg/dL    Calcium 7.7 (L) 8.5 - 10.1 mg/dL    Glucose 260 (H) 70 - 99 mg/dL    Alkaline Phosphatase 161 (H) 40 - 150 U/L     (H) 0 - 45 U/L    ALT 83 (H) 0 - 50 U/L    Protein Total 6.2 (L) 6.8 - 8.8 g/dL    Albumin 2.0 (L) 3.4 - 5.0 g/dL    Bilirubin Total 1.4 (H) 0.2 - 1.3 mg/dL    GFR Estimate 80 >60 mL/min/1.73m2   Lipase     Status: Abnormal   Result Value Ref Range    Lipase 14 (L) 73 - 393 U/L   INR     Status: Abnormal   Result Value Ref Range    INR 1.61 (H) 0.85 - 1.15   Ketone Beta-Hydroxybutyrate Quantitative     Status: Normal   Result Value Ref Range    Ketone (Beta-Hydroxybutyrate) Quantitative 0.1 0.0 - 0.6 mmol/L   Glucose by meter     Status: Abnormal   Result Value Ref Range    GLUCOSE BY METER POCT 239 (H) 70 - 99 mg/dL   CBC with platelets and differential     Status: Abnormal   Result Value Ref Range    WBC Count 8.5 4.0 - 11.0 10e3/uL    RBC Count 3.05 (L) 3.80 - 5.20 10e6/uL    Hemoglobin 9.9 (L) 11.7 - 15.7 g/dL    Hematocrit 30.3 (L) 35.0 - 47.0 %    MCV 99 78 - 100 fL    MCH 32.5 26.5 - 33.0 pg    MCHC 32.7 31.5 - 36.5 g/dL    RDW 15.1 (H) 10.0 - 15.0 %    Platelet Count 101 (L) 150 - 450 10e3/uL    % Neutrophils 76 %    % Lymphocytes 16 %    % Monocytes 7 %    % Eosinophils 0 %    % Basophils 0 %    % Immature Granulocytes 1 %    NRBCs per 100 WBC 0 <1 /100    Absolute Neutrophils 6.4 1.6 - 8.3 10e3/uL    Absolute Lymphocytes 1.4 0.8 - 5.3 10e3/uL    Absolute Monocytes 0.6 0.0 - 1.3 10e3/uL    Absolute Eosinophils 0.0 0.0 - 0.7 10e3/uL    Absolute Basophils 0.0 0.0 - 0.2 10e3/uL    Absolute Immature Granulocytes 0.1 (H) <=0.0 10e3/uL    Absolute NRBCs 0.0 10e3/uL   Ammonia (on ice)     Status: Normal   Result Value Ref Range    Ammonia 26 10 - 50 umol/L   iStat Gases (lactate) venous, POCT     Status: Abnormal   Result Value Ref Range    Lactic Acid POCT 2.3  (H) <=2.0 mmol/L    Bicarbonate Venous POCT 27 21 - 28 mmol/L    O2 Sat, Venous POCT 62 (L) 94 - 100 %    pCO2V Venous POCT 41 40 - 50 mm Hg    pH Venous POCT 7.44 (H) 7.32 - 7.43    pO2 Venous POCT 31 25 - 47 mm Hg   CBC with platelets differential     Status: Abnormal    Narrative    The following orders were created for panel order CBC with platelets differential.  Procedure                               Abnormality         Status                     ---------                               -----------         ------                     CBC with platelets and d...[[id number]]  Abnormal            Final result                 Please view results for these tests on the individual orders.     Medications   HYDROmorphone (PF) (DILAUDID) injection 0.5 mg (0.5 mg Intravenous Given 11/6/21 1507)   ondansetron (ZOFRAN) injection 4 mg (4 mg Intravenous Given 11/6/21 1506)   0.9% sodium chloride BOLUS (0 mLs Intravenous Stopped 11/6/21 1543)   insulin aspart (NovoLOG) injection (RAPID ACTING) (2 Units Subcutaneous Given 11/6/21 1538)   sodium chloride (PF) 0.9% PF flush 75 mL (75 mLs Intravenous Given 11/6/21 1547)   iopamidol (ISOVUE-370) solution 99 mL (99 mLs Intravenous Given 11/6/21 1547)        Assessments & Plan (with Medical Decision Making)     52 yo F with a hx of CUETO cirrhosis presenting with abdominal pain, nausea, poor PO intake and hyperglycemia in the setting of TIPS procedure yesterday.   Arrives afebrile and hemodynamically stable. BP 99/64.  IV access obtained and lab testing done.  Discussed with IR fellow on call who recommended US of TIPS to assess patency and CT to look for signs of bleeding.   CBC with WBC of 8.5. Hgb stable at 9.9 (9.5 yesterday but baseline is closer to 11).  CMP with doubling of bilirubin to 1.4 from 0.8, also increased AST, ALT and alk phos.   INR 1.6  Ketones negative and VBG with ph 7.44.   MELD 13, MELD-NA 16 (was 10, 13 yesterday)    I reviewed her home insulin monitor and she  has had glucose >300 and up to low 400s for the last 12-24 hours. Current is 260. Given 500mL 0.9NS and unit(s) novolog insulin. Repeat glucose 163  Treated with IV dilaudid and zofran with improvement in pain and nausea.     US showed patent TIPS with gallbladder wall thickening that is not new, no other signs of acute cholecystitis.   CT A/P   RESIDENT PRELIMINARY INTERPRETATION:   1. Slightly increased wall thickening of the cecum, rectosigmoid  colon, and gallbladder likely representing worsening edema in setting  of ascites, right pleural effusion, and soft tissue edema. Infection  is possible but less likely given normal white count.   2. Cirrhotic liver with patent TIPS.   3. Colonic diverticulosis without evidence for acute diverticulitis.   4. Unremarkable sequelae gastric bypass with Faustino-en-Y reconstruction.  No evidence of obstruction.    Discussed with GI fellow on call.   Will admit to IM service.   I have reviewed the nursing notes. I have reviewed the findings, diagnosis, plan and need for follow up with the patient.    New Prescriptions    No medications on file       Final diagnoses:   Abdominal pain, epigastric   Hyperglycemia   Cirrhosis of liver without ascites, unspecified hepatic cirrhosis type (H)       --  Sarah Yoder MD [facility] EMERGENCY DEPARTMENT  11/6/2021     Sarah Yoder MD  11/07/21 0901

## 2021-11-07 ENCOUNTER — APPOINTMENT (OUTPATIENT)
Dept: GENERAL RADIOLOGY | Facility: CLINIC | Age: 51
End: 2021-11-07
Attending: STUDENT IN AN ORGANIZED HEALTH CARE EDUCATION/TRAINING PROGRAM
Payer: COMMERCIAL

## 2021-11-07 ENCOUNTER — ANCILLARY PROCEDURE (OUTPATIENT)
Dept: ULTRASOUND IMAGING | Facility: CLINIC | Age: 51
End: 2021-11-07
Attending: INTERNAL MEDICINE
Payer: COMMERCIAL

## 2021-11-07 LAB
ALBUMIN SERPL-MCNC: 1.6 G/DL (ref 3.4–5)
ALP SERPL-CCNC: 131 U/L (ref 40–150)
ALT SERPL W P-5'-P-CCNC: 104 U/L (ref 0–50)
ANION GAP SERPL CALCULATED.3IONS-SCNC: 4 MMOL/L (ref 3–14)
AST SERPL W P-5'-P-CCNC: 281 U/L (ref 0–45)
BACTERIA UR CULT: NO GROWTH
BILIRUB SERPL-MCNC: 1.3 MG/DL (ref 0.2–1.3)
BUN SERPL-MCNC: 9 MG/DL (ref 7–30)
CALCIUM SERPL-MCNC: 7.4 MG/DL (ref 8.5–10.1)
CHLORIDE BLD-SCNC: 105 MMOL/L (ref 94–109)
CO2 SERPL-SCNC: 27 MMOL/L (ref 20–32)
CREAT SERPL-MCNC: 0.76 MG/DL (ref 0.52–1.04)
GFR SERPL CREATININE-BSD FRML MDRD: >90 ML/MIN/1.73M2
GLUCOSE BLD-MCNC: 131 MG/DL (ref 70–99)
GLUCOSE BLDC GLUCOMTR-MCNC: 121 MG/DL (ref 70–99)
GLUCOSE BLDC GLUCOMTR-MCNC: 151 MG/DL (ref 70–99)
GLUCOSE BLDC GLUCOMTR-MCNC: 163 MG/DL (ref 70–99)
GLUCOSE BLDC GLUCOMTR-MCNC: 176 MG/DL (ref 70–99)
GLUCOSE BLDC GLUCOMTR-MCNC: 194 MG/DL (ref 70–99)
INR PPP: 1.75 (ref 0.85–1.15)
POTASSIUM BLD-SCNC: 3.8 MMOL/L (ref 3.4–5.3)
PROCALCITONIN SERPL-MCNC: 0.31 NG/ML
PROT SERPL-MCNC: 5 G/DL (ref 6.8–8.8)
SODIUM SERPL-SCNC: 136 MMOL/L (ref 133–144)
SODIUM SERPL-SCNC: 136 MMOL/L (ref 133–144)

## 2021-11-07 PROCEDURE — 76705 ECHO EXAM OF ABDOMEN: CPT | Performed by: INTERNAL MEDICINE

## 2021-11-07 PROCEDURE — 82962 GLUCOSE BLOOD TEST: CPT

## 2021-11-07 PROCEDURE — 999N000111 HC STATISTIC OT IP EVAL DEFER: Performed by: OCCUPATIONAL THERAPIST

## 2021-11-07 PROCEDURE — 250N000012 HC RX MED GY IP 250 OP 636 PS 637

## 2021-11-07 PROCEDURE — 96372 THER/PROPH/DIAG INJ SC/IM: CPT

## 2021-11-07 PROCEDURE — 250N000013 HC RX MED GY IP 250 OP 250 PS 637

## 2021-11-07 PROCEDURE — 99207 PR CDG-CODE CATEGORY CHANGED: CPT | Performed by: STUDENT IN AN ORGANIZED HEALTH CARE EDUCATION/TRAINING PROGRAM

## 2021-11-07 PROCEDURE — 96375 TX/PRO/DX INJ NEW DRUG ADDON: CPT

## 2021-11-07 PROCEDURE — 36415 COLL VENOUS BLD VENIPUNCTURE: CPT

## 2021-11-07 PROCEDURE — G0378 HOSPITAL OBSERVATION PER HR: HCPCS

## 2021-11-07 PROCEDURE — 85610 PROTHROMBIN TIME: CPT

## 2021-11-07 PROCEDURE — 71045 X-RAY EXAM CHEST 1 VIEW: CPT | Mod: 26 | Performed by: RADIOLOGY

## 2021-11-07 PROCEDURE — 250N000013 HC RX MED GY IP 250 OP 250 PS 637: Performed by: STUDENT IN AN ORGANIZED HEALTH CARE EDUCATION/TRAINING PROGRAM

## 2021-11-07 PROCEDURE — 84295 ASSAY OF SERUM SODIUM: CPT

## 2021-11-07 PROCEDURE — 71045 X-RAY EXAM CHEST 1 VIEW: CPT

## 2021-11-07 PROCEDURE — 82040 ASSAY OF SERUM ALBUMIN: CPT | Performed by: STUDENT IN AN ORGANIZED HEALTH CARE EDUCATION/TRAINING PROGRAM

## 2021-11-07 PROCEDURE — 250N000009 HC RX 250

## 2021-11-07 PROCEDURE — 250N000011 HC RX IP 250 OP 636

## 2021-11-07 PROCEDURE — 84145 PROCALCITONIN (PCT): CPT | Performed by: STUDENT IN AN ORGANIZED HEALTH CARE EDUCATION/TRAINING PROGRAM

## 2021-11-07 PROCEDURE — 250N000011 HC RX IP 250 OP 636: Performed by: STUDENT IN AN ORGANIZED HEALTH CARE EDUCATION/TRAINING PROGRAM

## 2021-11-07 PROCEDURE — 99226 PR SUBSEQUENT OBSERVATION CARE,LEVEL III: CPT | Performed by: STUDENT IN AN ORGANIZED HEALTH CARE EDUCATION/TRAINING PROGRAM

## 2021-11-07 RX ORDER — DEXTROSE MONOHYDRATE 25 G/50ML
25-50 INJECTION, SOLUTION INTRAVENOUS
Status: DISCONTINUED | OUTPATIENT
Start: 2021-11-07 | End: 2021-11-07

## 2021-11-07 RX ORDER — CEFTRIAXONE 2 G/1
2 INJECTION, POWDER, FOR SOLUTION INTRAMUSCULAR; INTRAVENOUS EVERY 24 HOURS
Status: DISCONTINUED | OUTPATIENT
Start: 2021-11-07 | End: 2021-11-07

## 2021-11-07 RX ORDER — NICOTINE POLACRILEX 4 MG
15-30 LOZENGE BUCCAL
Status: DISCONTINUED | OUTPATIENT
Start: 2021-11-07 | End: 2021-11-07

## 2021-11-07 RX ORDER — LACTULOSE 10 G/15ML
30 SOLUTION ORAL
Status: DISCONTINUED | OUTPATIENT
Start: 2021-11-08 | End: 2021-11-08 | Stop reason: HOSPADM

## 2021-11-07 RX ADMIN — INSULIN GLARGINE 12 UNITS: 100 INJECTION, SOLUTION SUBCUTANEOUS at 07:27

## 2021-11-07 RX ADMIN — RIFAXIMIN 550 MG: 550 TABLET ORAL at 19:34

## 2021-11-07 RX ADMIN — ATORVASTATIN CALCIUM 20 MG: 20 TABLET, FILM COATED ORAL at 07:27

## 2021-11-07 RX ADMIN — LACTULOSE 20 G: 20 SOLUTION ORAL at 17:00

## 2021-11-07 RX ADMIN — RIFAXIMIN 550 MG: 550 TABLET ORAL at 07:27

## 2021-11-07 RX ADMIN — PANCRELIPASE 3 CAPSULE: 30000; 6000; 19000 CAPSULE, DELAYED RELEASE PELLETS ORAL at 19:00

## 2021-11-07 RX ADMIN — CEFTRIAXONE SODIUM 2 G: 2 INJECTION, POWDER, FOR SOLUTION INTRAMUSCULAR; INTRAVENOUS at 09:24

## 2021-11-07 RX ADMIN — LACTULOSE 20 G: 20 SOLUTION ORAL at 13:03

## 2021-11-07 RX ADMIN — PANCRELIPASE 3 CAPSULE: 30000; 6000; 19000 CAPSULE, DELAYED RELEASE PELLETS ORAL at 13:04

## 2021-11-07 RX ADMIN — PANTOPRAZOLE SODIUM 40 MG: 40 TABLET, DELAYED RELEASE ORAL at 07:27

## 2021-11-07 RX ADMIN — SITAGLIPTIN 100 MG: 100 TABLET, FILM COATED ORAL at 07:27

## 2021-11-07 RX ADMIN — LACTULOSE 20 G: 20 SOLUTION ORAL at 15:17

## 2021-11-07 RX ADMIN — PANCRELIPASE 3 CAPSULE: 30000; 6000; 19000 CAPSULE, DELAYED RELEASE PELLETS ORAL at 07:26

## 2021-11-07 RX ADMIN — FOLIC ACID 1 MG: 1 TABLET ORAL at 07:27

## 2021-11-07 RX ADMIN — PHYTONADIONE 0.1 MG: 10 INJECTION, EMULSION INTRAMUSCULAR; INTRAVENOUS; SUBCUTANEOUS at 07:27

## 2021-11-07 RX ADMIN — LEVOTHYROXINE SODIUM 200 MCG: 100 TABLET ORAL at 07:27

## 2021-11-07 RX ADMIN — VENLAFAXINE HYDROCHLORIDE 75 MG: 75 CAPSULE, EXTENDED RELEASE ORAL at 22:53

## 2021-11-07 ASSESSMENT — ENCOUNTER SYMPTOMS
CHILLS: 1
SORE THROAT: 0
RHINORRHEA: 0
FEVER: 0
COUGH: 0
BRUISES/BLEEDS EASILY: 1
SHORTNESS OF BREATH: 0

## 2021-11-07 NOTE — PLAN OF CARE
"Vital signs:  Temp: 100.1  F (37.8  C) Temp src: Oral BP: 114/70 Pulse: 104   Resp: 16 SpO2: 98 % O2 Device: None (Room air)   Height: 168.9 cm (5' 6.5\") Weight: 74.5 kg (164 lb 4.8 oz)  Observation goals:-diagnostic tests and consults completed and resulted- not met  -vital signs normal or at patient baseline- Met  -tolerating oral intake to maintain hydration-Not met   -returns to baseline functional status- In progress  Nurse to notify provider when observation goals have been met and patient is ready for discharge.  Activity: up ad adriano  Neuros: WDL ex anxious, PRN atarax given x1, aromatherapy.   Cardiac: WDL, VSS ex Tmax 100.1F, recheck 100.6F  Respiratory: WDL, sating well on RA.  GI/: Voiding spontaneously. No BM this shift.   Diet: Regular/consistent carb. Poorly tolerated.   Lines: PIV SL  Incisions/Drains: R neck incision from TIPS, ecycmosis and dried blood, covered with tegaderm.   Pain/nausea: C/o back pain. PRN oxycodone given x1. C/o intermittent nausea, IV zofran given x1, no relief, IV compazine given x1, pt sleeping comfortably.   New changes this shift: Admitted form ED around 2130.   Plan: Continue POC  "

## 2021-11-07 NOTE — H&P
[facility]    History and Physical - 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 S/P TIPSS on (11/05/2021), presented with sleep disturbance. Insomnia at night and felt sleepy on the day.  Ammonia 60.  No loss of consciousness.  Flapping tremor negative.  The patient appears to have early hepatic encephalopathy precipitated by TIPSS   History of grade 1 EV last EGD. No history of SBP. Liver synthetic function is INR 1.61, PLT 101k . MELD on admission   MELD-Na score: 15 at 11/6/2021  2:49 PM  MELD score: 13 at 11/6/2021  2:49 PM  Calculated from:  Serum Creatinine: 0.86 mg/dL (Using min of 1 mg/dL) at 11/6/2021  2:48 PM  Serum Sodium: 135 mmol/L at 11/6/2021  2:48 PM  Total Bilirubin: 1.4 mg/dL at 11/6/2021  2:48 PM  INR(ratio): 1.61 at 11/6/2021  2:49 PM  Age: 51 years     GI/hepatology consult    Daily MELD labs    lactulose 20mg tid (titrate to 3-5 BMs/day) with [facility] protocol    Rifaximin    consider diagnostic and therapeutic paracentesis for SBP r/o    blood and urine cultures    Hold lasix and aldactone    #Nausea  Has persistent nausea, preventing her from eating  Plan:   Ondensatron PRN    #Type II DM   missed, her insulin doses because she was not eating.    Plan    Continue PTA: glargine 12 international unit(s)    Continue PTA Januvia 100mg/day    Sliding scale    Diabetic diet    Hypoglycemia protocol    #Back pain   Complaints of low back pain (upper lumbar area), nonradiating longstanding but recently worsened special after procedure.  No evidence of  neurologic deficit.  Likely be due degenerative vertebral disease versus lumbar muscle sprain.    Consider lumbar vertebral MRI, if the pain continues    As needed oxycodone     Note that patient allergic to NSAID's    #Anxiety  Patient feels anxious about multiple problems she is having     atarax prn    #Depression  Continue PTA venlafaxine 75mg perday      Hypothyroidism  Continue PTA levothyroxine 200 mcg/day       Diet:  Diabetic diet  DVT Prophylaxis: Pneumatic Compression Devices  Rossi Catheter: Not present  Fluids:None  Central Lines: None  Code Status:  full    Clinically Significant Risk Factors Present on Admission             # Coagulation Defect: INR = 1.61 (Ref range: 0.85 - 1.15) and/or PTT = N/A on admission, will monitor for bleeding  # Thrombocytopenia: Plts = 101 10e3/uL (Ref range: 150 - 450 10e3/uL) on admission, will monitor for bleeding      Disposition Plan   Expected discharge: Expected to be discharged home once hepatic encephalopathy resolves.     The patient's care was discussed with the Attending Physician, Dr. Jer Alvarado.    Ricardo Bender MD   [id number]  [id number]  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 She also missed her insulindoses She has also mild  abdominal pain.  She failed to sleep last night and felt sleepy today.   She has also low back pain not radiating, which is present for several months but got worse over the past 2 days.  She denies fever, loss of consciousness  She has two bowel movements in the past 24 hours  She has stopped taking rifaximin and lactulose for the past 1 week.  She is also not taking furosemide and spironolactone.  No melena, or hematemesis.  She feels anxious about multiple problesm she is having.        Review of Systems    The 10 point Review of Systems is negative other than noted in the HPI or here.     Past Medical History    I have reviewed this patient's medical history and updated it with pertinent information if needed.   Past Medical History:   Diagnosis Date     Anemia      Anxiety      Cold sore      Depressive disorder      Diabetes (H)     Type 2 DM/No Insulin      Encephalopathy      Esophageal varices without bleeding (H)     grade I     History of blood transfusion     10/2019     History of seizure     diabetic seizure     Insomnia      Liver cirrhosis secondary to CUETO (H) 05/28/2020     Migraine      Pleural effusion      Thrombocytopenia (H)      Thyroid disease         Past Surgical History   I have reviewed this patient's surgical history and updated it with pertinent information if needed.  Past Surgical History:   Procedure Laterality Date     APPENDECTOMY      1989     COLONOSCOPY      1/2020 at [facility]      ENT SURGERY  1998    tempanoplasty     GI SURGERY      EGD August 2020 at [facility]      GYN SURGERY  2010    laparoscopic ablation     IR TRANSVEN INTRAHEPATIC PORTOSYST SHUNT  11/5/2021     MAMMOPLASTY REDUCTION BILATERAL  2004     IN STAB PHELBECTOMY VARICOSE VEINS, LESS THAN 10 INCISIONS, ONE EXTREMITY  2020     RNY gastric bypass  01/2006    retoocolic, retrogastric, [facility]     TONSILLECTOMY & ADENOIDECTOMY Bilateral 1978     WISDOM TEETH EXTRACTION          Social History   I have  reviewed this patient's social history and updated it with pertinent information if needed. Susan Puckett  reports that she has never smoked. She has never used smokeless tobacco. She reports previous alcohol use. She reports that she does not use drugs.    Family History   I have reviewed this patient's family history and updated it with pertinent information if needed.  Family History   Problem Relation Age of Onset     Anesthesia Reaction Nephew         PONV     Bleeding Disorder No family hx of      Clotting Disorder No family hx of        Prior to Admission Medications   Prior to Admission Medications   Prescriptions Last Dose Informant Patient Reported? Taking?   Continuous Blood Gluc  (DEXCOM G6 ) LUNA  Self Yes No   Sig: Use as directed for continuous glucose monitoring.   Continuous Blood Gluc Sensor (DEXCOM G6 SENSOR) MISC  Self Yes No   Sig: Use as directed for continuous glucose monitoring.  Change sensor every 10 days.   Continuous Blood Gluc Transmit (DEXCOM G6 TRANSMITTER) MISC  Self Yes No   Sig: Use as directed for continuous glucose monitoring.  Change every 3 months.   Ferrous Sulfate (IRON) 325 (65 FE) MG tablet Past Week at Unknown time Self Yes Yes   Sig: Take 1 tablet by mouth every morning    Multiple Vitamin (MULTIVITAMIN PO) Past Week at Unknown time Self Yes Yes   Sig: Take 2 tablets by mouth every morning    Rimegepant Sulfate 75 MG TBDP Past Week at Unknown time Self Yes Yes   Sig: Take 75 mg by mouth   SUMAtriptan (IMITREX) 50 MG tablet Past Month at Unknown time Self Yes Yes   Sig: Take 50 mg by mouth at onset of headache for migraine    Vitamin K, Phytonadione, 100 MCG TABS Past Week at Unknown time Self Yes Yes   Sig: Take 100 mcg by mouth every morning    acetaminophen (TYLENOL) 500 MG tablet Past Week at Unknown time Self Yes Yes   Sig: Take 500-1,000 mg by mouth every 6 hours as needed for mild pain   acetone urine (KETOSTIX) test strip  Self Yes No   Si strip    albuterol (PROAIR HFA/PROVENTIL HFA/VENTOLIN HFA) 108 (90 Base) MCG/ACT inhaler  at PRN Self Yes Yes   Sig: Inhale 1-2 puffs into the lungs   amylase-lipase-protease (CREON 6) 5510-02500-26610 units CPEP Past Week at Unknown time Self Yes Yes   Sig: Take 3 capsules by mouth 3 times daily (with meals)   atorvastatin (LIPITOR) 20 MG tablet 11/6/2021 at am Self Yes Yes   Sig: Take 20 mg by mouth every morning    calcium carbonate (TUMS) 500 MG chewable tablet Past Week at Unknown time Self Yes Yes   Sig: Take 1 tablet by mouth daily as needed for heartburn    calcium citrate-vitamin D (CITRACAL W/D) 250-100 MG-UNIT tablet Past Month at Unknown time Self No Yes   Sig: Take 2 tablets by mouth 2 times daily (with meals)   Patient taking differently: Take 1 tablet by mouth 2 times daily (with meals)    cyanocobalamin (VITAMIN B-12) 1000 MCG tablet Past Month at Unknown time Self Yes Yes   Sig: Take 1,000 mcg by mouth every 7 days    folic acid (FOLVITE) 1 MG tablet Past Week at Unknown time Self Yes Yes   Sig: Take 1 mg by mouth every morning    furosemide (LASIX) 40 MG tablet Past Week at Unknown time Self Yes Yes   Sig: Take 40 mg by mouth 2 times daily    hydrOXYzine (ATARAX) 25 MG tablet 11/5/2021 at am Self Yes Yes   Sig: Take 25 mg by mouth 3 times daily as needed for anxiety   insulin aspart (NOVOLOG FLEXPEN) 100 UNIT/ML pen  Self Yes No   Sig: Inject 1u/50>200 every 4 hours as needed for high blood glucose. Up to 20 units daily.  Indications: Diabetes Mellitus   insulin glargine (LANTUS PEN) 100 UNIT/ML pen  Self Yes No   Sig: Inject 12 Units Subcutaneous every morning Takes around 12noon   insulin pen needle (BD GRISEL U/F) 32G X 4 MM miscellaneous  Self Yes No   Sig: Inject 1 each Subcutaneous   lactulose (CHRONULAC) 10 GM/15ML solution Past Week at Unknown time Self No Yes   Sig: TAKE 30 MLS BY MOUTH 3 TIMES DAILY   Patient taking differently: 3 times daily    levothyroxine (SYNTHROID/LEVOTHROID) 200 MCG tablet  11/5/2021 at am Self Yes Yes   Sig: Take 200 mcg by mouth   metoclopramide (REGLAN) 10 MG tablet 11/5/2021 at am Self Yes Yes   Sig: Take 10 mg by mouth   multivitamin (DEKAS ESSENTIAL) capsule Past Week at Unknown time Self Yes Yes   Sig: Take 1 capsule by mouth daily    omeprazole (PRILOSEC) 20 MG DR capsule 11/5/2021 at am Self No Yes   Sig: Take 1 capsule (20 mg) by mouth daily   ondansetron (ZOFRAN-ODT) 4 MG ODT tab 11/6/2021 at am Self Yes Yes   Sig: Place 4 mg under the tongue every 6 hours as needed for nausea    polyethylene glycol (MIRALAX) 17 g packet Past Month at Unknown time Self Yes Yes   Sig: Take 1 packet by mouth daily as needed for constipation   rifampin (RIFADIN) 300 MG capsule Past Week at Unknown time Self No Yes   Sig: TAKE 1 CAPSULE BY MOUTH EVERY DAY   Patient taking differently: every evening    rifaximin (XIFAXAN) 550 MG TABS tablet Past Week at Unknown time Self No Yes   Sig: Take 1 tablet (550 mg) by mouth 2 times daily   simethicone (MYLICON) 80 MG chewable tablet 11/5/2021 at Unknown time Self Yes Yes   Sig: Take 80 mg by mouth every 6 hours as needed for flatulence or cramping   sitagliptin (JANUVIA) 100 MG tablet Past Week at Unknown time Self Yes Yes   Sig: Take 100 mg by mouth every morning    spironolactone (ALDACTONE) 100 MG tablet Past Week at Unknown time Self No Yes   Sig: Take 2.5 tablets (250 mg) by mouth daily   Patient taking differently: Take 250 mg by mouth every morning    topiramate (TOPAMAX) 50 MG tablet Past Week at Unknown time Self Yes Yes   Sig: Take 50 mg by mouth daily    traZODone (DESYREL) 100 MG tablet Past Week at Unknown time Self Yes Yes   Sig: Take  mg by mouth nightly as needed for sleep   valACYclovir (VALTREX) 1000 mg tablet  at PRN Self Yes Yes   Sig: Take 1,000 mg by mouth daily as needed (at onset of cold sore)    venlafaxine (EFFEXOR) 75 MG tablet Past Week at Unknown time Self No Yes   Sig: Take 1 tablet (75 mg) by mouth At Bedtime    vitamin A 3 MG (36636 UNITS) capsule Past Week at Unknown time Self Yes Yes   Sig: Take 10,000 Units by mouth every morning    vitamin C (ASCORBIC ACID) 1000 MG TABS Past Week at Unknown time Self Yes Yes   vitamin D3 (CHOLECALCIFEROL) 50 mcg (2000 units) tablet Past Week at Unknown time Self No Yes   Sig: Take 1 tablet (50 mcg) by mouth daily   Patient taking differently: Take 1 tablet by mouth every morning    vitamin D3 (CHOLECALCIFEROL) 50 mcg (2000 units) tablet Past Week at Unknown time Self No Yes   Sig: Take 1 tablet (50 mcg) by mouth daily   vitamin E (TOCOPHEROL) 400 units (180 mg) capsule Past Week at Unknown time Self Yes Yes   Sig: Take 400 Units by mouth every morning       Facility-Administered Medications: None     Allergies   Allergies   Allergen Reactions     Methylprednisolone Hives     Droperidol Anxiety     Nsaids Other (See Comments)     GI bleed.     Prednisone Anxiety, Hives and Rash       Physical Exam   Vital Signs: Temp: 97.8  F (36.6  C) Temp src: Oral BP: 109/69 Pulse: 80   Resp: 16 SpO2: 99 % O2 Device: None (Room air)    Weight: 160 lbs 0 oz    Constitutional:   Appears well. Awake and alert. Answers all questions appropriately.   Eyes:   No scleral icterus. PERRLA. EOMI  ENT and Mouth:   NC/AT. Oropharynx clear.  Respiratory:   Breathing comfortably on room air. CTAB. No accessory muscle use. No crackles, wheezes, rhonchi or rales.  Cardiovascular:   No JVD, RRR. No murmurs, rubs or gallops.  GI:   Soft, nontender, nondistended. No guarding or rebound tenderness. No organomegaly.  Skin:   No rash. No ecchymoses or petechiae.  Musculoskeletal:   Normal muscle bulk and tone. Extremities warm and well perfused. No edema  Neurologic:   AOx3. CN grossly II-XII intact. No focal deficits. Face symmetric. Moving all extremities equally and independently.No flapping tremor      Data   Data reviewed today: I reviewed all medications, new labs and imaging results over the last 24 hours.      Recent Labs   Lab 11/06/21  1735 11/06/21  1449 11/06/21  1448 11/06/21  1440 11/05/21  0924 11/05/21  0701 11/05/21  0630 11/02/21  1506 11/02/21  1505   0000   WBC  --   --  8.5  --   --  4.0  --  4.2  --   --    HGB  --   --  9.9*  --   --  9.5*  --  11.2*  --   --    MCV  --   --  99  --   --  101*  --  100  --   --    PLT  --   --  101*  --   --  89*  --  108*  --   --    INR  --  1.61*  --   --   --  1.36*  --   --  1.24*  --    NA  --   --  135  --   --  136  --  137  --   --    POTASSIUM  --   --  3.7  --   --  3.6  --  3.5  --   --    CHLORIDE  --   --  101  --   --  107  --  106  --   --    CO2  --   --  28  --   --  24  --  30  --   --    BUN  --   --  9  --   --  9  --  9  --   --    CR  --   --  0.86  --   --  0.77  --  0.89  --   --    ANIONGAP  --   --  6  --   --  5  --  1*  --   --    MAURI  --   --  7.7*  --   --  7.4*  --  8.4*  --   --    *  --  260* 239*   < > 186*   < > 68*  --    < >   ALBUMIN  --   --  2.0*  --   --  1.9*  --  2.7*  --    < >   PROTTOTAL  --   --  6.2*  --   --  6.0*  --  7.2  --    < >   BILITOTAL  --   --  1.4*  --   --  0.8  --  0.7  --    < >   ALKPHOS  --   --  161*  --   --  139  --  155*  --    < >   ALT  --   --  83*  --   --  26  --  27  --    < >   AST  --   --  197*  --   --  52*  --  43  --    < >   LIPASE  --   --  14*  --   --   --   --   --   --   --     < > = values in this interval not displayed.     Most Recent 3 CBC's:Recent Labs   Lab Test 11/06/21  1448 11/05/21  0701 11/02/21  1506   WBC 8.5 4.0 4.2   HGB 9.9* 9.5* 11.2*   MCV 99 101* 100   * 89* 108*     Most Recent 3 BMP's:Recent Labs   Lab Test 11/06/21  1735 11/06/21  1448 11/06/21  1440 11/05/21  0924 11/05/21  0701 11/05/21  0630 11/02/21  1506   NA  --  135  --   --  136  --  137   POTASSIUM  --  3.7  --   --  3.6  --  3.5   CHLORIDE  --  101  --   --  107  --  106   CO2  --  28  --   --  24  --  30   BUN  --  9  --   --  9  --  9   CR  --  0.86  --   --  0.77  --  0.89   ANIONGAP   --  6  --   --  5  --  1*   MAURI  --  7.7*  --   --  7.4*  --  8.4*   * 260* 239*   < > 186*   < > 68*    < > = values in this interval not displayed.     Most Recent 2 LFT's:Recent Labs   Lab Test 11/06/21  1448 11/05/21  0701   * 52*   ALT 83* 26   ALKPHOS 161* 139   BILITOTAL 1.4* 0.8     Most Recent 3 INR's:Recent Labs   Lab Test 11/06/21  1449 11/05/21  0701 11/02/21  1505   INR 1.61* 1.36* 1.24*

## 2021-11-07 NOTE — CONSULTS
GASTROENTEROLOGY CONSULTATION      Date of Admission:  11/6/2021           Reason for Consultation:   We were asked by Dr. Yepez to evaluate this patient with Hepatic encephalopathy after TIPS           ASSESSMENT AND RECOMMENDATIONS:   Assessment:  51 year old female with a history of CUETO cirrhosis c/p recurrent hepatic hydrothorax with inability to uptitrate diuretics d/t GEORGETTE, now s/p TIPS 11/5 and admitted with fevers, abdominal pain, poor PO intake, and Grade I hepatic encephalopathy.     # Hepatic encephalopathy post TIPS, Grade I  Mild symptoms (day/night reversal, mental cloudiness). Likely precipitated by TIPS procedure on 11/5. Was not able to take lactulose/rifaximin at home after TIPS given GI symptoms. Potential infection (see below) also potential contributer to presentation, although no clear source as of yet. No e/o GIB, creatinine at baseline argues against dehydration/overdiuresis as etiology. She was on lactulose and rifaximin at home per chart review, these should be restarted here in the hospital given her presentation with Grade I encephalopathy.    # Fevers  May be related to inflammation post TIPS, but need to exclude infectious etiologies. UA negative. BCx's NGTD. CXR pending. CT without fluid collections or other discreet infectious source. TIPS appeared patient. No safe pocket for paracentesis to exclude SBP on bedside U/S.    # Transaminase elevation  Expected after TIPS procedure, would continue to monitor with daily liver chemistries. No evidence of complication related to TIPS on CT.    # Decompensated cirrhosis 2/2 CUETO  Follows with Dr. Cook as an outpatient, last seen 10/5/21. Has had recurrent hepatic hydrothorax resistant to diuretics (worsening renal function with uptitration). S/p TIPS on 11/5 as above. MELD was previously ~10, slightly increased after TIPS given Bilirubin increase.     MELD-Na score: 15 at 11/7/2021  7:42 AM  MELD score: 14 at 11/7/2021  7:42 AM  Calculated  from:  Serum Creatinine: 0.76 mg/dL (Using min of 1 mg/dL) at 11/7/2021  7:42 AM  Serum Sodium: 136 mmol/L at 11/7/2021  7:42 AM  Total Bilirubin: 1.3 mg/dL at 11/7/2021  7:42 AM  INR(ratio): 1.75 at 11/7/2021  7:42 AM  Age: 51 years      Recommendations  - Lactulose per [facility] protocol (titrate 3-4 BMs daily)  - continue home rifaximin 550 mg BID  - daily MELD labs (LFTs, BMP, INR) and CBC  - agree with broad infectious work-up which is under way, no additional recommendations from GI standpoint at this point in time  - no need for empiric antibiotics from GI standpoint (fevers potentially related to TIPS related inflammation) unless worsening fevers or vital sign instability  - OK to hold diuretics for now to avoid dehydration precipitating worsening HE, will reassess daily for re-initiation  - Low protein, low sodium diet   - GI will continue to follow    Gastroenterology outpatient follow up recommendations: [facility]    Thank you for involving us in this patient's care. Please do not hesitate to contact the GI service with any questions or concerns.     Pt care plan discussed with Dr. Burgess, GI staff physician.    Jarad Worthington MD  -------------------------------------------------------------------------------------------------------------------           History of Present Illness:   Susan Puckett is a 51 year old female with a history of CUETO cirrhosis c/p recurrent hepatic hydrothorax with inability to uptitrate diuretics d/t GEORGETTE, now s/p TIPS 11/5 and admitted with fevers, abdominal pain, poor PO intake, and Grade I hepatic encephalopathy.    She feels mildly confused, not quite clearheaded.  She has noticed a significant amount of daytime tiredness, with vivid dreams at night and poor sleep.  The symptoms have been present even before her TIPS, but did worsen since her TIPS on Friday.  She has not been able to take her lactulose or rifaximin at home (or any of her pills), due to right upper quadrant  abdominal pain, as well as nausea and vomiting.  She also endorses chills.  No hematemesis odynophagia, dysphagia, melena, hematochezia.    Vitals are notable for fever to 100.6.  Labs notable for , , total bilirubin 1.3, alkaline phosphatase 131, albumin 1.6, total protein 5.0. INR 1.75. CBC with hemoglobin 9.9, WBC 8.5, platelets 101.     CT as noted below with post TIPS changes but no infectious sources appreciated. Some right colon thickening felt related to edema, especially in absence of GI symptoms. TIPS doppler with sluggish flow but patent. GB wall edematous.            Past Medical History:   Reviewed and edited as appropriate  Past Medical History:   Diagnosis Date    Anemia     Anxiety     Cold sore     Depressive disorder     Diabetes (H)     Type 2 DM/No Insulin     Encephalopathy     Esophageal varices without bleeding (H)     grade I    History of blood transfusion     10/2019    History of seizure     diabetic seizure    Insomnia     Liver cirrhosis secondary to CUETO (H) 05/28/2020    Migraine     Pleural effusion     Thrombocytopenia (H)     Thyroid disease             Past Surgical History:   Reviewed and edited as appropriate   Past Surgical History:   Procedure Laterality Date    APPENDECTOMY      1989    COLONOSCOPY      1/2020 at [facility]     ENT SURGERY  1998    tempanoplasty    GI SURGERY      EGD August 2020 at [facility]     GYN SURGERY  2010    laparoscopic ablation    IR TRANSVEN INTRAHEPATIC PORTOSYST SHUNT  11/5/2021    MAMMOPLASTY REDUCTION BILATERAL  2004    VA STAB PHELBECTOMY VARICOSE VEINS, LESS THAN 10 INCISIONS, ONE EXTREMITY  2020    RNY gastric bypass  01/2006    retoocolic, retrogastric, [facility]    TONSILLECTOMY & ADENOIDECTOMY Bilateral 1978    WISDOM TEETH EXTRACTION              Previous Endoscopy:   No results found for this or any previous visit.         Social History:   Reviewed and edited as appropriate  Social History     Socioeconomic History     Marital status:      Spouse name: Not on file    Number of children: Not on file    Years of education: Not on file    Highest education level: Bachelor's degree (e.g., BA, AB, BS)   Occupational History    Not on file   Tobacco Use    Smoking status: Never Smoker    Smokeless tobacco: Never Used   Substance and Sexual Activity    Alcohol use: Not Currently     Comment: Last drink was in 2017    Drug use: Never    Sexual activity: Not on file   Other Topics Concern    Parent/sibling w/ CABG, MI or angioplasty before 65F 55M? Not Asked   Social History Narrative    Not on file     Social Determinants of Health     Financial Resource Strain: Not on file   Food Insecurity: No Food Insecurity    Worried About Running Out of Food in the Last Year: Never true    Ran Out of Food in the Last Year: Never true   Transportation Needs: No Transportation Needs    Lack of Transportation (Medical): No    Lack of Transportation (Non-Medical): No   Physical Activity: Not on file   Stress: Not on file   Social Connections: Not on file   Intimate Partner Violence: Not on file   Housing Stability: Not on file            Family History:   Reviewed and edited as appropriate  Family History   Problem Relation Age of Onset    Anesthesia Reaction Nephew         PONV    Bleeding Disorder No family hx of     Clotting Disorder No family hx of      No known history of colorectal cancer, liver disease, or inflammatory bowel disease.         Allergies:   Reviewed and edited as appropriate     Allergies   Allergen Reactions    Methylprednisolone Hives    Droperidol Anxiety    Nsaids Other (See Comments)     GI bleed.    Prednisone Anxiety, Hives and Rash            Medications:     Current Facility-Administered Medications   Medication    albuterol (PROAIR HFA/PROVENTIL HFA/VENTOLIN HFA) 108 (90 Base) MCG/ACT inhaler 1-2 puff    amylase-lipase-protease (CREON 6) [id number] units per capsule 3 capsule    atorvastatin (LIPITOR)  "tablet 20 mg    cefTRIAXone (ROCEPHIN) 2 g vial to attach to  ml bag for ADULTS or NS 50 ml bag for PEDS    Daily 2 GRAM acetaminophen limit, unless fulminent liver failure or transaminases greater than or equal to 300 - 400, then none    glucose gel 15-30 g    Or    dextrose 50 % injection 25-50 mL    Or    glucagon injection 1 mg    folic acid (FOLVITE) tablet 1 mg    hydrOXYzine (ATARAX) tablet 25 mg    insulin aspart (NovoLOG) injection (RAPID ACTING)    insulin glargine (LANTUS PEN) injection 12 Units    ketorolac (TORADOL) injection 30 mg    lactulose (CHRONULAC) solution 20 g    Or    lactulose (CHRONULAC) solution for enema prep 100 g    levothyroxine (SYNTHROID/LEVOTHROID) tablet 200 mcg    melatonin tablet 1 mg    metoclopramide (REGLAN) tablet 10 mg    naloxone (NARCAN) injection 0.2 mg    Or    naloxone (NARCAN) injection 0.4 mg    Or    naloxone (NARCAN) injection 0.2 mg    Or    naloxone (NARCAN) injection 0.4 mg    ondansetron (ZOFRAN-ODT) ODT tab 4 mg    Or    ondansetron (ZOFRAN) injection 4 mg    oxyCODONE (ROXICODONE) tablet 5 mg    pantoprazole (PROTONIX) EC tablet 40 mg    phytonadione (MEPHYTON/VITAMIN K) 1 MG/ML oral solution 0.1 mg    polyethylene glycol (MIRALAX) Packet 17 g    prochlorperazine (COMPAZINE) injection 5 mg    rifaximin (XIFAXAN) tablet 550 mg    sitagliptin (JANUVIA) tablet 100 mg    traZODone (DESYREL) tablet  mg    venlafaxine (EFFEXOR-XR) 24 hr capsule 75 mg             Review of Systems:     A complete 10 point review of systems was performed and is negative except as noted in the HPI           Physical Exam:   BP 91/57 (BP Location: Right arm)   Pulse 81   Temp 97.3  F (36.3  C) (Oral)   Resp 16   Ht 1.689 m (5' 6.5\")   Wt 74.5 kg (164 lb 4.8 oz)   LMP 09/01/2020 (Within Weeks)   SpO2 96%   BMI 26.12 kg/m    Wt:   Wt Readings from Last 2 Encounters:   11/06/21 74.5 kg (164 lb 4.8 oz)   11/05/21 73.5 kg (162 lb 0.6 oz)      Constitutional: No acute " distress, resting comfortably in bed, tired appearing  Eyes: Sclera anicteric  Ears/nose/mouth/throat: Moist mucus membranes, hearing intact  Neck: supple  CV: No edema  Respiratory: Breathing comfortably on room air  Abd: Soft, TTP in RUQ and right flank, no rebound or guarding, bowel sounds present  Skin: warm, perfused, no jaundice  Neuro: AAO x 3, no asterixis but fine tremor present  Psych: Normal affect  MSK: No gross deformities         Data:   Labs and imaging below were independently reviewed and interpreted    BMP  Recent Labs   Lab 11/07/21  0909 11/07/21  0742 11/07/21  0555 11/07/21  0153 11/06/21  2206 11/06/21  2201 11/06/21  1735 11/06/21  1448 11/05/21  0924 11/05/21  0701 11/05/21  0630 11/02/21  1506   NA  --  136  136  --   --   --   --   --  135  --  136  --  137   POTASSIUM  --  3.8  --   --   --  3.8  --  3.7  --  3.6  --  3.5   CHLORIDE  --  105  --   --   --   --   --  101  --  107  --  106   MAURI  --  7.4*  --   --   --   --   --  7.7*  --  7.4*  --  8.4*   CO2  --  27  --   --   --   --   --  28  --  24  --  30   BUN  --  9  --   --   --   --   --  9  --  9  --  9   CR  --  0.76  --   --   --   --   --  0.86  --  0.77  --  0.89   * 131* 151* 163*   < >  --    < > 260*   < > 186*   < > 68*    < > = values in this interval not displayed.     CBC  Recent Labs   Lab 11/06/21  1448 11/05/21  0701 11/02/21  1506   WBC 8.5 4.0 4.2   RBC 3.05* 2.96* 3.50*   HGB 9.9* 9.5* 11.2*   HCT 30.3* 29.8* 34.9*   MCV 99 101* 100   MCH 32.5 32.1 32.0   MCHC 32.7 31.9 32.1   RDW 15.1* 14.6 14.5   * 89* 108*     INR  Recent Labs   Lab 11/07/21  0742 11/06/21  1449 11/05/21  0701 11/02/21  1505   INR 1.75* 1.61* 1.36* 1.24*     LFTs  Recent Labs   Lab 11/07/21  0742 11/06/21  1448 11/05/21  0701 11/02/21  1506   ALKPHOS 131 161* 139 155*   * 197* 52* 43   * 83* 26 27   BILITOTAL 1.3 1.4* 0.8 0.7   PROTTOTAL 5.0* 6.2* 6.0* 7.2   ALBUMIN 1.6* 2.0* 1.9* 2.7*      PANC  Recent Labs   Lab  11/06/21  1448   LIPASE 14*       Imaging:     CT 11/5:                                                                   IMPRESSION:   1. Slightly increased wall thickening of the cecum, rectosigmoid  colon, and gallbladder likely representing worsening edema in setting  of ascites, right pleural effusion, and soft tissue edema. Infection  is possible but less likely given normal white count.   2. Cirrhotic liver with patent TIPS.   3. Colonic diverticulosis without evidence for acute diverticulitis.   4. Unremarkable sequelae gastric bypass with Faustino-en-Y reconstruction.  No evidence of obstruction.    TIPS Doppler  Impression:   1. Patent TIPS with sluggish flow but without hemodynamically  significant stenosis.  2. Antegrade flow in the main portal vein with retrograde flow in the  right portal vein. The left portal vein is not visualized, however was  patent on the same day CT. This is likely technical.  3. Cirrhotic configuration of the liver with evidence of portal  hypertension including splenomegaly.  4. Again seen is diffuse wall thickening of the gallbladder wall  without additional evidence of cholecystitis, likely reactive.    ATTENDING NOTE, GASTROENTEROLOGY/HEPATOLOGY    I saw and discussed this patient with the fellow and participated in the decision making. I agree with the fellow's note. Che Burgess MD

## 2021-11-07 NOTE — PLAN OF CARE
"Vital signs:  Temp: (!) 95.8  F (35.4  C) Temp src: Oral BP: 119/67 Pulse: 85   Resp: 16 SpO2: 100 % O2 Device: None (Room air)   Height: 168.9 cm (5' 6.5\") Weight: 74.5 kg (164 lb 4.8 oz)      Observation goals:  -diagnostic tests and consults completed and resulted- not met  -vital signs normal or at patient baseline- Met  -tolerating oral intake to maintain hydration-Not met   -returns to baseline functional status- In progress  Nurse to notify provider when observation goals have been met and patient is ready for discharge.      Admitted/transferred from: ED  2 RN full   skin assessment completed by Zee Mallory, RN and Precious Linda RN.  Skin assessment finding: issues found R neck incision with tegadern,. ecchymosis   Interventions/actions: skin interventions encourage ambulation, fluids     Will continue to monitor.    "

## 2021-11-07 NOTE — PROGRESS NOTES
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 Ammonia 60, no asterixis. Was not taking lactulose or rifaximin at home due to nausea. Likely mild HE precipitated by TIPS and not taking lactulose. History of grade 1 EV last EGD. No history of SBP. Doppler US of liver shows patent TIPS, cirrhotic liver. CT A/P with wall thickening of cecum, RS colon, and GB likely 2/2 edema.   MELD-Na score: 15 at 11/7/2021  7:42 AM  MELD score: 14 at 11/7/2021  7:42 AM  Calculated from:  Serum Creatinine: 0.76 mg/dL (Using min of 1 mg/dL) at 11/7/2021  7:42 AM  Serum Sodium: 136 mmol/L at 11/7/2021  7:42 AM  Total Bilirubin: 1.3 mg/dL at 11/7/2021  7:42 AM  INR(ratio): 1.75 at 11/7/2021  7:42 AM  Age: 51 years  - GI consulted, appreciate recs  - daily MELD labs  - continue lactulose [place] protocol, titrate to 3-4 stools daily  - continue PTA rifaximin  - CAPS team consulted for diagnostic para, no ascites to tap  - hold PTA diuretics    # Fever  Low grade temp to 38.1C 11/7 AM. No fever or chills at home. Sxs only nausea, abd pain, back pain, HE. No respiratory or urinary symptoms. Other considerations other than infectious etiologies would be post-TIPS fever. Very low suspicion for meningitis.  - UA without infection. CXR  ordered, small R pleural effusion with hazy opacity throughout R lung related to effusion vs PNA. PNA less likely - no hypoxia, SOB, respiratory symptoms at all.    - F/up BCx   - Given 1 dose of IV CTX 2 g in case of SBP but unable to obtain diagnostic para due to lack of ascites  - Will monitor off of abx for now, low suspicion for SBP given lack of ascites  - Could consider repeating US in 24-48 hours with CAPS to see if ascites develops    # Nausea  Has persistent nausea, leading to decreased PO. Feeling better.   - Antiemetics PRN     #Type II DM  Missed her insulin doses at home, she was concerned she would become hypoglycemic while unable to take PO. BS reportedly ~300s at home. No evidence of DKA.   - PTA glargine 12 unit(s)  - PTA Januvia 100 mg daily  - ISS  - hypoglycemia protocol   - BG QID     # Abdominal and back pain   Complaints of low back pain (upper lumbar area), nonradiating longstanding but recently worsened after TIPS procedure.  No evidence of neurologic deficit.  Likely be due degenerative vertebral disease versus lumbar muscle sprain. Abd pain diffuse. Overall can be related to post TIPS.   - PRN pain medications  - no NSAIDs due to cirrhosis     # Normocytic/macrocytic anemia  # Thrombocytopenia  Hgb ~9s, was 10s-11s prior to TIPS. MCV 99-100s. Plts ~80k-100ks.  No evidence of bleeding. Related to liver disease.   - CBC    # Depression  - Continue PTA venlafaxine 75mg daily  - Atarax PRN for anxiety     # Hypothyroidism  - Continue PTA levothyroxine 200 mcg/day       Diet: 2 Gram Sodium Diet    DVT Prophylaxis: Pneumatic Compression Devices  Rossi Catheter: Not present  Central Lines: None  Code Status: Full Code      Disposition Plan   Expected discharge: 11/09/2021 recommended to prior living arrangement once fevers resolve and ID workup completed and resolution of HE.     The patient's care was discussed with the Bedside Nurse and Patient.    Sis Yepez MD (Sally)  Internal  Medicine/Pediatrics  Hospitalist    Hospitalist Service, [address]  Securely message with the [employer] Web Console (learn more here)  Text page via [facility] Paging/Directory    Please see sign in/sign out for up to date coverage information    ______________________________________________________________________    Interval History   Admitted overnight. Feeling a lot better now. Nausea and pain improved. Doesn't feel confused but is foggy. No sob, cough, urinary or URI sxs.     Data reviewed today: I reviewed all medications, new labs and imaging results over the last 24 hours. I personally reviewed no images or EKG's today.    Physical Exam   Vital Signs: Temp: 98.3  F (36.8  C) Temp src: Oral BP: 96/62 Pulse: 88   Resp: 16 SpO2: 92 % O2 Device: None (Room air)    Weight: 164 lbs 4.8 oz    General: awake, alert, in no acute distress  HEENT: NCAT, PERRL, sclera anicteric, no nasal discharge, MMM, posterior pharynx without erythema or exudates  CV: RRR, soft systolic murmur  Resp: CTAB, no increased WOB  Abd: Soft, uncomfortable to deep palpation, nondistended, +BS, no rebound or guarding  MSK: No peripheral edema, extremities warm and well perfused  Skin: warm, dry, no jaundice  Neuro: No focal deficits. Alert and oriented x4.      Data   Results for orders placed or performed during the hospital encounter of 11/06/21 (from the past 24 hour(s))   CT Abdomen Pelvis w Contrast    Narrative    EXAM: CT ABDOMEN PELVIS W CONTRAST, 11/6/2021    TECHNIQUE:  Helical CT images from the lung bases through the  symphysis pubis were obtained after the administration of intravenous  contrast. Coronal and sagittal reformatted images were generated at a  workstation for further assessment. Contrast dose: iopamidol  (ISOVUE-370) solution 99 mL.    HISTORY: Nausea/vomiting; Epigastric pain    COMPARISON: CT 2/26/2021, 12/7/2020, 11/22/2020.    FINDINGS:    LUNG BASES: Small right  pleural effusion. No acute airspace disease.    ABDOMEN/PELVIS: Patent TIPS. Trace pneumobilia. Cirrhotic liver with  periportal edema. No focal hepatic lesion. Slight increased diffuse  gallbladder wall edema without surrounding inflammatory change. No  acute abnormality of the pancreas (there is fatty replacement),  spleen, adrenal glands, kidneys, or urinary bladder. The uterus and  ovaries are grossly unremarkable.     Gastric bypass with Faustino-en-Y reconstruction. The left normally  dilated loops of bowel. Segmental colonic wall thickening, new in the  rectosigmoid colon and similar in the cecum. Colonic diverticulosis  without definite acute diverticulitis. Trace free fluid in the right  paracolic gutter and pelvis. No free intra-abdominal air.  Nonaneurysmal abdominal aorta. Unchanged enlargement of multiple  retroperitoneal lymph nodes, largest measuring nearly 1.8 cm in short  axis near the caudate lobe. Numerous prominent enteric lymph nodes  have slightly increased in size. Stable presacral soft tissue  attenuation.    BONES / SOFT TISSUES: No acute skeletal lesion or suspicious skeletal  abnormality. Mild diffuse soft tissue edema.      Impression    IMPRESSION:   1. Slightly increased wall thickening of the cecum, rectosigmoid  colon, and gallbladder likely representing worsening edema in setting  of ascites, right pleural effusion, and soft tissue edema. Infection  is possible but less likely given normal white count.   2. Cirrhotic liver with patent TIPS.   3. Colonic diverticulosis without evidence for acute diverticulitis.   4. Unremarkable sequelae gastric bypass with Faustino-en-Y reconstruction.  No evidence of obstruction.    I have personally reviewed the examination and initial interpretation  and I agree with the findings.    CHRISTOS DONG MD         SYSTEM ID:  [id number]    TIPSS Doppler    Narrative    EXAMINATION: TEMPORARY, 11/6/2021 4:33 PM     COMPARISON: CT 11/6/2021    HISTORY: Abdominal  and back pain after TIPS, evaluate for patency    TECHNIQUE:  Grey-scale, color Doppler and spectral flow analysis.    Findings:     Liver: Coarsened hepatic echotexture. No focal masses. The main portal  vein is patent with antegrade flow.    Gallbladder: No cholelithiasis. Diffuse gallbladder wall thickening  measuring up to 6 mm. No pericholecystic fluid or gallbladder wall  hyperemia.     Bile Ducts: Both the intra and extrahepatic biliary system are of  normal caliber with the common duct measuring 3 mm in diameter.    Pancreas: Pancreas obscured by overlying bowel gas and cannot be  evaluated on this exam.    Kidneys: The right kidney is of normal echotexture, without mass nor  hydronephrosis.   The craniocaudal dimensions are: right- 10 cm,. Left  kidney not visualized. Spleen: The spleen is enlarged and measures  14.2 cm in sagittal dimension.    Aorta and IVC: The visualized portions of the aorta and IVC are  unremarkable.    Fluid: No definite ascites, however there is a trace amount of  perihepatic/subphrenic fluid on the same day CT.     DOPPLER:     There is flow toward the liver in the main portal vein:  30 cm/sec in the main portal vein  Retrograde at 11 cm/sec in the right portal vein  Left portal vein is not visualized. This was patent on the comparison  CT. There is flow toward the liver in the hepatic artery:  118 cm/sec peak systolic flow  38 cm/sec minimum diastolic flow   0.68 resistive index     The stent velocities are  45 cm/sec at the portal vein  65 cm/sec in mid stent  136 cm/sec in the hepatic vein distal shunt end.     The splenic vein is patent and flow is towards the liver.     The left, middle, and right hepatic veins are patent with flow towards  the IVC.       Impression    Impression:   1. Patent TIPS with sluggish flow but without hemodynamically  significant stenosis.  2. Antegrade flow in the main portal vein with retrograde flow in the  right portal vein. The left portal vein  is not visualized, however was  patent on the same day CT. This is likely technical.  3. Cirrhotic configuration of the liver with evidence of portal  hypertension including splenomegaly.  4. Again seen is diffuse wall thickening of the gallbladder wall  without additional evidence of cholecystitis, likely reactive.    I have personally reviewed the examination and initial interpretation  and I agree with the findings.    CHRISTOS DONG MD         SYSTEM ID:  [id number]   Glucose by meter   Result Value Ref Range    GLUCOSE BY METER POCT 163 (H) 70 - 99 mg/dL   Asymptomatic COVID-19 Virus (Coronavirus) by PCR Nasopharyngeal    Specimen: Nasopharyngeal; Swab   Result Value Ref Range    SARS CoV2 PCR Negative Negative    Narrative    Testing was performed using the Xpert Xpress SARS-CoV-2 Assay on the  Cepheid Gene-Xpert Instrument Systems. Additional information about  this Emergency Use Authorization (EUA) assay can be found via the Lab  Guide. This test should be ordered for the detection of SARS-CoV-2 in  individuals who meet SARS-CoV-2 clinical and/or epidemiological  criteria. Test performance is unknown in asymptomatic patients. This  test is for in vitro diagnostic use under the FDA EUA for  laboratories certified under CLIA to perform high complexity testing.  This test has not been FDA cleared or approved. A negative result  does not rule out the presence of PCR inhibitors in the specimen or  target RNA in concentration below the limit of detection for the  assay. The possibility of a false negative should be considered if  the patient's recent exposure or clinical presentation suggests  COVID-19. This test was validated by the [facility]. This laboratory is certified under  the Clinical Laboratory Improvement Amendments of 1988 (CLIA-88) as  qualified to perform high complexity laboratory testing.     UA with Microscopic reflex to Culture    Specimen: Urine,  Clean Catch   Result Value Ref Range    Color Urine Light Yellow Colorless, Straw, Light Yellow, Yellow    Appearance Urine Clear Clear    Glucose Urine Negative Negative mg/dL    Bilirubin Urine Negative Negative    Ketones Urine Negative Negative mg/dL    Specific Gravity Urine 1.010 1.003 - 1.035    Blood Urine Negative Negative    pH Urine 6.0 5.0 - 7.0    Protein Albumin Urine 10  (A) Negative mg/dL    Urobilinogen Urine Normal Normal, 2.0 mg/dL    Nitrite Urine Negative Negative    Leukocyte Esterase Urine Negative Negative    RBC Urine 1 <=2 /HPF    WBC Urine <1 <=5 /HPF    Squamous Epithelials Urine 2 (H) <=1 /HPF    Narrative    Urine Culture not indicated   Blood Culture Arm, Right    Specimen: Arm, Right; Blood   Result Value Ref Range    Culture No growth after 12 hours    Potassium   Result Value Ref Range    Potassium 3.8 3.4 - 5.3 mmol/L   Magnesium   Result Value Ref Range    Magnesium 1.8 1.6 - 2.3 mg/dL   Glucose by meter   Result Value Ref Range    GLUCOSE BY METER POCT 156 (H) 70 - 99 mg/dL   Glucose by meter   Result Value Ref Range    GLUCOSE BY METER POCT 163 (H) 70 - 99 mg/dL   Glucose by meter   Result Value Ref Range    GLUCOSE BY METER POCT 151 (H) 70 - 99 mg/dL   Sodium   Result Value Ref Range    Sodium 136 133 - 144 mmol/L   INR   Result Value Ref Range    INR 1.75 (H) 0.85 - 1.15   Comprehensive metabolic panel   Result Value Ref Range    Sodium 136 133 - 144 mmol/L    Potassium 3.8 3.4 - 5.3 mmol/L    Chloride 105 94 - 109 mmol/L    Carbon Dioxide (CO2) 27 20 - 32 mmol/L    Anion Gap 4 3 - 14 mmol/L    Urea Nitrogen 9 7 - 30 mg/dL    Creatinine 0.76 0.52 - 1.04 mg/dL    Calcium 7.4 (L) 8.5 - 10.1 mg/dL    Glucose 131 (H) 70 - 99 mg/dL    Alkaline Phosphatase 131 40 - 150 U/L     (H) 0 - 45 U/L     (H) 0 - 50 U/L    Protein Total 5.0 (L) 6.8 - 8.8 g/dL    Albumin 1.6 (L) 3.4 - 5.0 g/dL    Bilirubin Total 1.3 0.2 - 1.3 mg/dL    GFR Estimate >90 >60 mL/min/1.73m2   Extra  Tube ([facility] Draw)    Narrative    The following orders were created for panel order Extra Tube ([place] Draw).  Procedure                               Abnormality         Status                     ---------                               -----------         ------                     Extra Purple Top Tube[[id number]]                                                         Please view results for these tests on the individual orders.   Internal Medicine Procedure Team ADULT IP Consult [place] - Paracentesis    Narrative    Columba Ma MD     11/7/2021 12:42 PM  POCUS Abdomen    Indication: Evaluate for ascites for safe site for paracentesis    Impression: no fluid pocket is a safe site for paracentesis    LLQ, LUQ, RLQ, RUQ no ascites present on my abd ultrasound.    Columba Ma MD   11/7/2021    GI Hepatology Adult IP Consult: Patient to be seen: Routine within 24 hrs; Call back #: [id number]; CUETO cirrhosis s/p TIPS 11/5 p/w HE, nausea, pain. Fever overnight. Appreciate recs for management.; Consultant may enter orders: Yes; Requesting prov...    Narrative    Jarad Worthington MD     11/7/2021  1:16 PM      GASTROENTEROLOGY CONSULTATION      Date of Admission:  11/6/2021           Reason for Consultation:   We were asked by Dr. Yepez to evaluate this patient with Hepatic   encephalopathy after TIPS           ASSESSMENT AND RECOMMENDATIONS:   Assessment:  51 year old female with a history of CUETO cirrhosis c/p recurrent   hepatic hydrothorax with inability to uptitrate diuretics d/t   GEORGETTE, now s/p TIPS 11/5 and admitted with fevers, abdominal pain,   poor PO intake, and Grade I hepatic encephalopathy.     # Hepatic encephalopathy post TIPS, Grade I  Mild symptoms (day/night reversal, mental cloudiness). Likely   precipitated by TIPS procedure on 11/5. Was not able to take   lactulose/rifaximin at home after TIPS given GI symptoms.   Potential infection (see below) also potential contributer to    presentation, although no clear source as of yet. No e/o GIB,   creatinine at baseline argues against dehydration/overdiuresis as   etiology. She was on lactulose and rifaximin at home per chart   review, these should be restarted here in the hospital given her   presentation with Grade I encephalopathy.    # Fevers  May be related to inflammation post TIPS, but need to exclude   infectious etiologies. UA negative. BCx's NGTD. CXR pending. CT   without fluid collections or other discreet infectious source.   TIPS appeared patient. No safe pocket for paracentesis to exclude   SBP on bedside U/S.    # Transaminase elevation  Expected after TIPS procedure, would continue to monitor with   daily liver chemistries. No evidence of complication related to   TIPS on CT.    # Decompensated cirrhosis 2/2 CUETO  Follows with Dr. Cook as an outpatient, last seen 10/5/21. Has   had recurrent hepatic hydrothorax resistant to diuretics   (worsening renal function with uptitration). S/p TIPS on 11/5 as   above. MELD was previously ~10, slightly increased after TIPS   given Bilirubin increase.     MELD-Na score: 15 at 11/7/2021  7:42 AM  MELD score: 14 at 11/7/2021  7:42 AM  Calculated from:  Serum Creatinine: 0.76 mg/dL (Using min of 1 mg/dL) at 11/7/2021    7:42 AM  Serum Sodium: 136 mmol/L at 11/7/2021  7:42 AM  Total Bilirubin: 1.3 mg/dL at 11/7/2021  7:42 AM  INR(ratio): 1.75 at 11/7/2021  7:42 AM  Age: 51 years      Recommendations  - Lactulose per [facility] protocol (titrate 3-4 BMs daily)  - continue home rifaximin 550 mg BID  - daily MELD labs (LFTs, BMP, INR) and CBC  - agree with broad infectious work-up which is under way, no   additional recommendations from GI standpoint at this point in   time  - no need for empiric antibiotics from GI standpoint (fevers   potentially related to TIPS related inflammation) unless   worsening fevers or vital sign instability  - OK to hold diuretics for now to avoid dehydration  precipitating   worsening HE, will reassess daily for re-initiation  - Low protein, low sodium diet   - GI will continue to follow    Gastroenterology outpatient follow up recommendations: TBD    Thank you for involving us in this patient's care. Please do not   hesitate to contact the GI service with any questions or   concerns.     Pt care plan discussed with Dr. Burgess, GI staff physician.    Jarad Worthington MD  ------------------------------------------------------------------  -------------------------------------------------           History of Present Illness:   Susan Puckett is a 51 year old female with a history of CUETO   cirrhosis c/p recurrent hepatic hydrothorax with inability to   uptitrate diuretics d/t GEORGETTE, now s/p TIPS 11/5 and admitted with   fevers, abdominal pain, poor PO intake, and Grade I hepatic   encephalopathy.    She feels mildly confused, not quite clearheaded.  She has   noticed a significant amount of daytime tiredness, with vivid   dreams at night and poor sleep.  The symptoms have been present   even before her TIPS, but did worsen since her TIPS on Friday.    She has not been able to take her lactulose or rifaximin at home   (or any of her pills), due to right upper quadrant abdominal   pain, as well as nausea and vomiting.  She also endorses chills.    No hematemesis odynophagia, dysphagia, melena, hematochezia.    Vitals are notable for fever to 100.6.  Labs notable for ,   , total bilirubin 1.3, alkaline phosphatase 131, albumin   1.6, total protein 5.0. INR 1.75. CBC with hemoglobin 9.9, WBC   8.5, platelets 101.     CT as noted below with post TIPS changes but no infectious   sources appreciated. Some right colon thickening felt related to   edema, especially in absence of GI symptoms. TIPS doppler with   sluggish flow but patent. GB wall edematous.            Past Medical History:   Reviewed and edited as appropriate  Past Medical History:   Diagnosis Date      Anemia      Anxiety      Cold sore      Depressive disorder      Diabetes (H)     Type 2 DM/No Insulin      Encephalopathy      Esophageal varices without bleeding (H)     grade I     History of blood transfusion     10/2019     History of seizure     diabetic seizure     Insomnia      Liver cirrhosis secondary to CUETO (H) 05/28/2020     Migraine      Pleural effusion      Thrombocytopenia (H)      Thyroid disease             Past Surgical History:   Reviewed and edited as appropriate   Past Surgical History:   Procedure Laterality Date     APPENDECTOMY      1989     COLONOSCOPY      1/2020 at [facility]      ENT SURGERY  1998    tempanoplasty     GI SURGERY      EGD August 2020 at [facility]      GYN SURGERY  2010    laparoscopic ablation     IR TRANSVEN INTRAHEPATIC PORTOSYST SHUNT  11/5/2021     MAMMOPLASTY REDUCTION BILATERAL  2004     AK STAB PHELBECTOMY VARICOSE VEINS, LESS THAN 10 INCISIONS, ONE   EXTREMITY  2020     RNY gastric bypass  01/2006    retoocolic, retrogastric, [facility]     TONSILLECTOMY & ADENOIDECTOMY Bilateral 1978     WISDOM TEETH EXTRACTION              Previous Endoscopy:   No results found for this or any previous visit.         Social History:   Reviewed and edited as appropriate  Social History     Socioeconomic History     Marital status:      Spouse name: Not on file     Number of children: Not on file     Years of education: Not on file     Highest education level: Bachelor's degree (e.g., BA, AB, BS)   Occupational History     Not on file   Tobacco Use     Smoking status: Never Smoker     Smokeless tobacco: Never Used   Substance and Sexual Activity     Alcohol use: Not Currently     Comment: Last drink was in 2017     Drug use: Never     Sexual activity: Not on file   Other Topics Concern     Parent/sibling w/ CABG, MI or angioplasty before 65F 55M? Not   Asked   Social History Narrative     Not on file     Social Determinants of Health     Financial Resource Strain:  Not on file   Food Insecurity: No Food Insecurity     Worried About Running Out of Food in the Last Year: Never true     Ran Out of Food in the Last Year: Never true   Transportation Needs: No Transportation Needs     Lack of Transportation (Medical): No     Lack of Transportation (Non-Medical): No   Physical Activity: Not on file   Stress: Not on file   Social Connections: Not on file   Intimate Partner Violence: Not on file   Housing Stability: Not on file            Family History:   Reviewed and edited as appropriate  Family History   Problem Relation Age of Onset     Anesthesia Reaction Nephew         PONV     Bleeding Disorder No family hx of      Clotting Disorder No family hx of      No known history of colorectal cancer, liver disease, or   inflammatory bowel disease.         Allergies:   Reviewed and edited as appropriate     Allergies   Allergen Reactions     Methylprednisolone Hives     Droperidol Anxiety     Nsaids Other (See Comments)     GI bleed.     Prednisone Anxiety, Hives and Rash            Medications:     Current Facility-Administered Medications   Medication     albuterol (PROAIR HFA/PROVENTIL HFA/VENTOLIN HFA) 108 (90 Base)   MCG/ACT inhaler 1-2 puff     amylase-lipase-protease (CREON 6) [phone] units per   capsule 3 capsule     atorvastatin (LIPITOR) tablet 20 mg     cefTRIAXone (ROCEPHIN) 2 g vial to attach to  ml bag for   ADULTS or NS 50 ml bag for PEDS     Daily 2 GRAM acetaminophen limit, unless fulminent liver   failure or transaminases greater than or equal to 300 - 400, then   none     glucose gel 15-30 g    Or     dextrose 50 % injection 25-50 mL    Or     glucagon injection 1 mg     folic acid (FOLVITE) tablet 1 mg     hydrOXYzine (ATARAX) tablet 25 mg     insulin aspart (NovoLOG) injection (RAPID ACTING)     insulin glargine (LANTUS PEN) injection 12 Units     ketorolac (TORADOL) injection 30 mg     lactulose (CHRONULAC) solution 20 g    Or     lactulose  "(CHRONULAC) solution for enema prep 100 g     levothyroxine (SYNTHROID/LEVOTHROID) tablet 200 mcg     melatonin tablet 1 mg     metoclopramide (REGLAN) tablet 10 mg     naloxone (NARCAN) injection 0.2 mg    Or     naloxone (NARCAN) injection 0.4 mg    Or     naloxone (NARCAN) injection 0.2 mg    Or     naloxone (NARCAN) injection 0.4 mg     ondansetron (ZOFRAN-ODT) ODT tab 4 mg    Or     ondansetron (ZOFRAN) injection 4 mg     oxyCODONE (ROXICODONE) tablet 5 mg     pantoprazole (PROTONIX) EC tablet 40 mg     phytonadione (MEPHYTON/VITAMIN K) 1 MG/ML oral solution 0.1 mg     polyethylene glycol (MIRALAX) Packet 17 g     prochlorperazine (COMPAZINE) injection 5 mg     rifaximin (XIFAXAN) tablet 550 mg     sitagliptin (JANUVIA) tablet 100 mg     traZODone (DESYREL) tablet  mg     venlafaxine (EFFEXOR-XR) 24 hr capsule 75 mg             Review of Systems:     A complete 10 point review of systems was performed and is   negative except as noted in the HPI           Physical Exam:   BP 91/57 (BP Location: Right arm)   Pulse 81   Temp 97.3  F   (36.3  C) (Oral)   Resp 16   Ht 1.689 m (5' 6.5\")   Wt 74.5 kg   (164 lb 4.8 oz)   LMP 09/01/2020 (Within Weeks)   SpO2 96%     BMI 26.12 kg/m    Wt:   Wt Readings from Last 2 Encounters:   11/06/21 74.5 kg (164 lb 4.8 oz)   11/05/21 73.5 kg (162 lb 0.6 oz)      Constitutional: No acute distress, resting comfortably in bed,   tired appearing  Eyes: Sclera anicteric  Ears/nose/mouth/throat: Moist mucus membranes, hearing intact  Neck: supple  CV: No edema  Respiratory: Breathing comfortably on room air  Abd: Soft, TTP in RUQ and right flank, no rebound or guarding,   bowel sounds present  Skin: warm, perfused, no jaundice  Neuro: AAO x 3, no asterixis but fine tremor present  Psych: Normal affect  MSK: No gross deformities         Data:   Labs and imaging below were independently reviewed and   interpreted    BMP  Recent Labs   Lab 11/07/21  0909 11/07/21  0742 " 11/07/21  0555 11/07/21  0153 11/06/21 2206 11/06/21 2201 11/06/21  1735 11/06/21  1448 11/05/21  0924 11/05/21  0701 11/05/21  0630 11/02/21  1506   NA  --  136  136  --   --   --   --   --  135  --  136  --  137   POTASSIUM  --  3.8  --   --   --  3.8  --  3.7  --  3.6  --  3.5   CHLORIDE  --  105  --   --   --   --   --  101  --  107  --  106   MAURI  --  7.4*  --   --   --   --   --  7.7*  --  7.4*  --  8.4*   CO2  --  27  --   --   --   --   --  28  --  24  --  30   BUN  --  9  --   --   --   --   --  9  --  9  --  9   CR  --  0.76  --   --   --   --   --  0.86  --  0.77  --  0.89   * 131* 151* 163*   < >  --    < > 260*   < > 186*   < >   68*    < > = values in this interval not displayed.     CBC  Recent Labs   Lab 11/06/21  1448 11/05/21 0701 11/02/21  1506   WBC 8.5 4.0 4.2   RBC 3.05* 2.96* 3.50*   HGB 9.9* 9.5* 11.2*   HCT 30.3* 29.8* 34.9*   MCV 99 101* 100   MCH 32.5 32.1 32.0   MCHC 32.7 31.9 32.1   RDW 15.1* 14.6 14.5   * 89* 108*     INR  Recent Labs   Lab 11/07/21  0742 11/06/21  1449 11/05/21  0701 11/02/21  1505   INR 1.75* 1.61* 1.36* 1.24*     LFTs  Recent Labs   Lab 11/07/21  0742 11/06/21  1448 11/05/21  0701 11/02/21  1506   ALKPHOS 131 161* 139 155*   * 197* 52* 43   * 83* 26 27   BILITOTAL 1.3 1.4* 0.8 0.7   PROTTOTAL 5.0* 6.2* 6.0* 7.2   ALBUMIN 1.6* 2.0* 1.9* 2.7*      PANC  Recent Labs   Lab 11/06/21  1448   LIPASE 14*       Imaging:     CT 11/5:                                                                   IMPRESSION:   1. Slightly increased wall thickening of the cecum, rectosigmoid  colon, and gallbladder likely representing worsening edema in   setting  of ascites, right pleural effusion, and soft tissue edema.   Infection  is possible but less likely given normal white count.   2. Cirrhotic liver with patent TIPS.   3. Colonic diverticulosis without evidence for acute   diverticulitis.   4. Unremarkable sequelae gastric bypass with Faustino-en-Y    reconstruction.  No evidence of obstruction.    TIPS Doppler  Impression:   1. Patent TIPS with sluggish flow but without hemodynamically  significant stenosis.  2. Antegrade flow in the main portal vein with retrograde flow in   the  right portal vein. The left portal vein is not visualized,   however was  patent on the same day CT. This is likely technical.  3. Cirrhotic configuration of the liver with evidence of portal  hypertension including splenomegaly.  4. Again seen is diffuse wall thickening of the gallbladder wall  without additional evidence of cholecystitis, likely reactive.   Glucose by meter   Result Value Ref Range    GLUCOSE BY METER POCT 121 (H) 70 - 99 mg/dL   XR Chest Port 1 View    Narrative    Exam: XR CHEST PORT 1 VIEW, 11/7/2021 10:47 AM    Indication: fever, r/o PNA    Comparison: 12/5/2020    Findings:   Increase hazy opacity over the right thorax with blunting of the left  costophrenic angle. Low lung volumes. Heart and pulmonary vasculature  within normal limits.      Impression    Impression:   1. There is a new small right pleural effusion. The hazy opacity  throughout the right lung could be related to the effusion. Pulmonary  opacity from pneumonia isn't excluded.  2. Low lung volume accounts for the prominence of the pulmonary  vasculature.    CHRISTOS DONG MD         SYSTEM ID:  [id number]   POC US Guide for Paracentesis    Impression    POCUS Abdomen    Indication: Evaluate for ascites for safe site for paracentesis    Impression: no fluid pocket is a safe site for paracentesis    LLQ, LUQ, RLQ, RUQ no ascites present on my abd ultrasound.    Columba Ma MD   11/7/2021    Glucose by meter   Result Value Ref Range    GLUCOSE BY METER POCT 176 (H) 70 - 99 mg/dL

## 2021-11-07 NOTE — PLAN OF CARE
Vitals: Temp: 98.3  F (36.8  C) Temp src: Oral BP: 96/62 Pulse: 88   Resp: 16 SpO2: 92 % O2 Device: None (Room air)        Time: 9173-2896  Reason for admission: abdominal/back pain, nausea, hyperglycemia.   Activity:  up ad adriano.   Pain:  Denies pain and nausea.   Neuro: A&O x4. CMS intact. Lethargic - [place] (stage I) initiated and prn lactulose given x3.   Cardiac: WDL.   Respiratory:  LS clear. Sating >95% on RA. Denies cough or SOB.   GI/:  Voiding spontaneously. No BM.   Diet: Regular. BG checks before meals.   Lines:  R PIV SL.   Incisions/Drains/Skin:  Skin intact.   Lab:  BGs 121, 176.   Electrolyte Replacements: X     New changes this shift:  NA. Continue plan of care.

## 2021-11-07 NOTE — PLAN OF CARE
OT: CX: Pt denied any change in function. Pt able to demo LE dressing, bed mobility and walking in room. Pt appears fully Ind, will complete OBS OT Eval order.

## 2021-11-07 NOTE — CONSULTS
POCUS Abdomen    Indication: Evaluate for ascites for safe site for paracentesis    Impression: no fluid pocket is a safe site for paracentesis    LLQ, LUQ, RLQ, RUQ no ascites present on my abd ultrasound.    Columba Ma MD   11/7/2021

## 2021-11-08 VITALS
SYSTOLIC BLOOD PRESSURE: 100 MMHG | RESPIRATION RATE: 15 BRPM | WEIGHT: 164.3 LBS | OXYGEN SATURATION: 94 % | HEART RATE: 94 BPM | HEIGHT: 67 IN | BODY MASS INDEX: 25.79 KG/M2 | DIASTOLIC BLOOD PRESSURE: 67 MMHG | TEMPERATURE: 98.9 F

## 2021-11-08 LAB
ALBUMIN SERPL-MCNC: 1.5 G/DL (ref 3.4–5)
ALP SERPL-CCNC: 128 U/L (ref 40–150)
ALT SERPL W P-5'-P-CCNC: 77 U/L (ref 0–50)
ANION GAP SERPL CALCULATED.3IONS-SCNC: 3 MMOL/L (ref 3–14)
AST SERPL W P-5'-P-CCNC: 133 U/L (ref 0–45)
BILIRUB SERPL-MCNC: 1 MG/DL (ref 0.2–1.3)
BUN SERPL-MCNC: 7 MG/DL (ref 7–30)
CALCIUM SERPL-MCNC: 7.6 MG/DL (ref 8.5–10.1)
CHLORIDE BLD-SCNC: 107 MMOL/L (ref 94–109)
CO2 SERPL-SCNC: 28 MMOL/L (ref 20–32)
CREAT SERPL-MCNC: 0.81 MG/DL (ref 0.52–1.04)
ERYTHROCYTE [DISTWIDTH] IN BLOOD BY AUTOMATED COUNT: 15 % (ref 10–15)
GFR SERPL CREATININE-BSD FRML MDRD: 86 ML/MIN/1.73M2
GLUCOSE BLD-MCNC: 130 MG/DL (ref 70–99)
GLUCOSE BLDC GLUCOMTR-MCNC: 131 MG/DL (ref 70–99)
GLUCOSE BLDC GLUCOMTR-MCNC: 157 MG/DL (ref 70–99)
GLUCOSE BLDC GLUCOMTR-MCNC: 169 MG/DL (ref 70–99)
GLUCOSE BLDC GLUCOMTR-MCNC: 221 MG/DL (ref 70–99)
HCT VFR BLD AUTO: 25.7 % (ref 35–47)
HGB BLD-MCNC: 8.4 G/DL (ref 11.7–15.7)
INR PPP: 1.66 (ref 0.85–1.15)
MAGNESIUM SERPL-MCNC: 2.1 MG/DL (ref 1.6–2.3)
MCH RBC QN AUTO: 31.9 PG (ref 26.5–33)
MCHC RBC AUTO-ENTMCNC: 32.7 G/DL (ref 31.5–36.5)
MCV RBC AUTO: 98 FL (ref 78–100)
PLAT MORPH BLD: NORMAL
PLATELET # BLD AUTO: 55 10E3/UL (ref 150–450)
POTASSIUM BLD-SCNC: 3.6 MMOL/L (ref 3.4–5.3)
PROT SERPL-MCNC: 4.8 G/DL (ref 6.8–8.8)
RBC # BLD AUTO: 2.63 10E6/UL (ref 3.8–5.2)
RBC MORPH BLD: NORMAL
SODIUM SERPL-SCNC: 138 MMOL/L (ref 133–144)
WBC # BLD AUTO: 3.9 10E3/UL (ref 4–11)

## 2021-11-08 PROCEDURE — 99217 PR OBSERVATION CARE DISCHARGE: CPT | Performed by: STUDENT IN AN ORGANIZED HEALTH CARE EDUCATION/TRAINING PROGRAM

## 2021-11-08 PROCEDURE — 999N000147 HC STATISTIC PT IP EVAL DEFER

## 2021-11-08 PROCEDURE — 83735 ASSAY OF MAGNESIUM: CPT | Performed by: INTERNAL MEDICINE

## 2021-11-08 PROCEDURE — 82962 GLUCOSE BLOOD TEST: CPT

## 2021-11-08 PROCEDURE — 85610 PROTHROMBIN TIME: CPT

## 2021-11-08 PROCEDURE — 80053 COMPREHEN METABOLIC PANEL: CPT | Performed by: STUDENT IN AN ORGANIZED HEALTH CARE EDUCATION/TRAINING PROGRAM

## 2021-11-08 PROCEDURE — 250N000011 HC RX IP 250 OP 636

## 2021-11-08 PROCEDURE — 85027 COMPLETE CBC AUTOMATED: CPT | Performed by: STUDENT IN AN ORGANIZED HEALTH CARE EDUCATION/TRAINING PROGRAM

## 2021-11-08 PROCEDURE — 250N000013 HC RX MED GY IP 250 OP 250 PS 637: Performed by: PHYSICIAN ASSISTANT

## 2021-11-08 PROCEDURE — 96372 THER/PROPH/DIAG INJ SC/IM: CPT

## 2021-11-08 PROCEDURE — 250N000009 HC RX 250

## 2021-11-08 PROCEDURE — G0378 HOSPITAL OBSERVATION PER HR: HCPCS

## 2021-11-08 PROCEDURE — 36415 COLL VENOUS BLD VENIPUNCTURE: CPT | Performed by: STUDENT IN AN ORGANIZED HEALTH CARE EDUCATION/TRAINING PROGRAM

## 2021-11-08 PROCEDURE — 250N000013 HC RX MED GY IP 250 OP 250 PS 637

## 2021-11-08 PROCEDURE — 99207 PR CDG-CODE CATEGORY CHANGED: CPT | Performed by: STUDENT IN AN ORGANIZED HEALTH CARE EDUCATION/TRAINING PROGRAM

## 2021-11-08 RX ORDER — ACETAMINOPHEN 500 MG
500 TABLET ORAL EVERY 6 HOURS PRN
Status: ON HOLD
Start: 2021-11-08 | End: 2022-07-14

## 2021-11-08 RX ORDER — ONDANSETRON 4 MG/1
4 TABLET, ORALLY DISINTEGRATING ORAL EVERY 6 HOURS PRN
Qty: 120 TABLET | Refills: 1 | Status: SHIPPED | OUTPATIENT
Start: 2021-11-08 | End: 2022-07-10

## 2021-11-08 RX ORDER — PROCHLORPERAZINE MALEATE 10 MG
10 TABLET ORAL EVERY 6 HOURS PRN
Qty: 120 TABLET | Refills: 1 | Status: ON HOLD | OUTPATIENT
Start: 2021-11-08 | End: 2021-12-28

## 2021-11-08 RX ADMIN — LACTULOSE 30 G: 20 SOLUTION ORAL at 12:53

## 2021-11-08 RX ADMIN — LACTULOSE 30 G: 20 SOLUTION ORAL at 08:46

## 2021-11-08 RX ADMIN — ATORVASTATIN CALCIUM 20 MG: 20 TABLET, FILM COATED ORAL at 08:47

## 2021-11-08 RX ADMIN — PANCRELIPASE 3 CAPSULE: 30000; 6000; 19000 CAPSULE, DELAYED RELEASE PELLETS ORAL at 08:48

## 2021-11-08 RX ADMIN — PANCRELIPASE 3 CAPSULE: 30000; 6000; 19000 CAPSULE, DELAYED RELEASE PELLETS ORAL at 12:59

## 2021-11-08 RX ADMIN — TRAZODONE HYDROCHLORIDE 100 MG: 50 TABLET ORAL at 00:36

## 2021-11-08 RX ADMIN — INSULIN GLARGINE 12 UNITS: 100 INJECTION, SOLUTION SUBCUTANEOUS at 08:49

## 2021-11-08 RX ADMIN — FOLIC ACID 1 MG: 1 TABLET ORAL at 08:48

## 2021-11-08 RX ADMIN — PANTOPRAZOLE SODIUM 40 MG: 40 TABLET, DELAYED RELEASE ORAL at 08:47

## 2021-11-08 RX ADMIN — LEVOTHYROXINE SODIUM 200 MCG: 100 TABLET ORAL at 08:47

## 2021-11-08 RX ADMIN — RIFAXIMIN 550 MG: 550 TABLET ORAL at 08:48

## 2021-11-08 RX ADMIN — PHYTONADIONE 0.1 MG: 10 INJECTION, EMULSION INTRAMUSCULAR; INTRAVENOUS; SUBCUTANEOUS at 08:58

## 2021-11-08 RX ADMIN — SITAGLIPTIN 100 MG: 100 TABLET, FILM COATED ORAL at 08:47

## 2021-11-08 NOTE — PROGRESS NOTES
Vitals stable,alert and oriented x4.Denied abdominal pain or abdominal discomfort,appetite good,no nausea.Bowel movement x1.Ambulating independently.Seen by provider,discharge order written,discharge orders and instructions explained to pt, follow up appointment and discharge medications explained to pt,pt would  discharge med from [facility] pharmacy.Patient verbalized understanding of information given,teach back done by patient.Patient was discharged home at 1515 with her belongings accompanied by her ,

## 2021-11-08 NOTE — PLAN OF CARE
"Vital signs:  Temp: 99.8  F (37.7  C) Temp src: Oral BP: 107/66 Pulse: 88   Resp: 16 SpO2: 96 % O2 Device: None (Room air)   Height: 168.9 cm (5' 6.5\") Weight: 74.5 kg (164 lb 4.8 oz)  Activity: up ad adriano  Neuros: WDL.   Cardiac: WDL, VSS, soft BPs  Respiratory: WDL, sating well on RA.  GI/: Voiding spontaneously. BM x1.   Diet: 2G Na diet  Lines: PIV SL  Incisions/Drains: Ecchymosis on R neck from TIPS insertion site.    Pain/nausea: Denies this shift   Plan: Continue POC  "

## 2021-11-08 NOTE — PROGRESS NOTES
Observation goals to be met before discharge:    diagnostic tests and consults completed and resulted :Not met,  -vital signs normal or at patient baseline :Met  -tolerating oral intake to maintain hydration :met  -returns to baseline functional status Met  Nurse to notify provider when observation goals have been met and patient is ready for discharge

## 2021-11-08 NOTE — PROGRESS NOTES
"[facility]    Hepatology Follow-up    CC: Fever after TIPS    Dx: Decompensated cirrhosis (ascites)   TIPS placement 11/5/2021   Fever (improved)   Hepatic encephalopathy    24 hour events: improving and no fever over last 24 hours     Subjective: She feels improved, though mildly anxious and slept poorly    Patient denies fevers, sweats or chills.    Medications  Current Facility-Administered Medications   Medication Dose Route Frequency    amylase-lipase-protease  3 capsule Oral TID w/meals    atorvastatin  20 mg Oral QAM    folic acid  1 mg Oral QAM    insulin aspart  1-4 Units Subcutaneous Q4H    insulin glargine  12 Units Subcutaneous QAM    lactulose  30 g Oral TID    levothyroxine  200 mcg Oral Daily    pantoprazole  40 mg Oral Daily    phytonadione  0.1 mg Oral QAM    rifaximin  550 mg Oral BID    sitagliptin  100 mg Oral QAM    venlafaxine  75 mg Oral At Bedtime       Review of systems  A 10-point review of systems was negative.    Examination  /67 (BP Location: Left arm)   Pulse 94   Temp 98.9  F (37.2  C) (Oral)   Resp 15   Ht 1.689 m (5' 6.5\")   Wt 74.5 kg (164 lb 4.8 oz)   LMP 09/01/2020 (Within Weeks)   SpO2 94%   BMI 26.12 kg/m      Intake/Output Summary (Last 24 hours) at 11/8/2021 0823  Last data filed at 11/7/2021 0900  Gross per 24 hour   Intake 240 ml   Output --   Net 240 ml       Gen- well, NAD, A+Ox3, normal color  RS- comfortable on room air  Abd- soft, non-tender  Extr- no edema  Skin- no rash  Psych- normal mood    Laboratory  Lab Results   Component Value Date     11/08/2021     07/05/2021    POTASSIUM 3.6 11/08/2021    POTASSIUM 3.0 07/05/2021    CHLORIDE 107 11/08/2021    CHLORIDE 107 07/05/2021    CO2 28 11/08/2021    CO2 29 07/05/2021    BUN 7 11/08/2021    BUN 11 07/05/2021    CR 0.81 11/08/2021    CR 1.10 07/05/2021       Lab Results   Component Value Date    BILITOTAL 1.0 11/08/2021    BILITOTAL 0.5 07/05/2021    ALT 77 " 11/08/2021    ALT 24 07/05/2021     11/08/2021    AST 33 07/05/2021    ALKPHOS 128 11/08/2021    ALKPHOS 133 07/05/2021       Lab Results   Component Value Date    WBC 8.5 11/06/2021    WBC 4.0 07/05/2021    HGB 9.9 11/06/2021    HGB 11.5 07/05/2021    MCV 99 11/06/2021    MCV 97 07/05/2021     11/06/2021     07/05/2021       Lab Results   Component Value Date    INR 1.66 11/08/2021    INR 1.18 07/05/2021     MELD-Na score: 12 at 11/8/2021  7:48 AM  MELD score: 12 at 11/8/2021  7:48 AM  Calculated from:  Serum Creatinine: 0.81 mg/dL (Using min of 1 mg/dL) at 11/8/2021  7:48 AM  Serum Sodium: 138 mmol/L (Using max of 137 mmol/L) at 11/8/2021  7:48 AM  Total Bilirubin: 1.0 mg/dL at 11/8/2021  7:48 AM  INR(ratio): 1.66 at 11/8/2021  7:48 AM  Age: 51 years      Assessment  51 year old female with a history of CUETO cirrhosis c/p recurrent hepatic hydrothorax with inability to uptitrate diuretics d/t GEORGETTE, now s/p TIPS 11/5 and admitted with fevers, abdominal pain, poor PO intake, and Grade I hepatic encephalopathy.    She continues to do well with clear mental status/sensorium and no fevers over the last 24 hours.  We are OK with her discharge from hepatology standpoint whenever felt appropriate by primary team.  She has follow-up arranged in the outpatient setting with hepatology for after discharge.    #1 Fever post-TIPS (resolved)  #2 Decompensated cirrhosis (ascites) due to CUETO  #3 TIPS placement 11/5/2021  #4 Grade I encephalopathy, resolved    Recommendations  - Lactulose per [facility] protocol (titrate 3-4 BMs daily)  - continue home rifaximin 550 mg BID  - daily MELD labs (LFTs, BMP, INR) and CBC  - Low protein, low sodium diet   - OK for discharge from hepatology perspective whenever felt appropriate by primary team  - Hepatology follow-up has been arranged for after discharge    Yoandy Ureña MD  Gastroenterology Fellow, PGY-5    Case staffed with attending, Dr. Burgess.    Hepatology  will continue to follow.  ATTENDING NOTE, GASTROENTEROLOGY/HEPATOLOGY    I saw and discussed this patient with the fellow and participated in the decision making. I agree with the fellow's note. Che Burgess MD

## 2021-11-08 NOTE — PROGRESS NOTES
Cross Cover Note    Cross cover provider contacted by nursing as the kitchen will not allow patient to have dinner because she has had 8g of sodium today and a 2g Na sodium diet restriction was set in place this afternoon. The only food option available is a banana. Patient is requesting to eat this evening, and will comply to Na restricted diet in AM.    Will order regular diet for now to allow patient to eat (rodrigue as she is diabetic on insulin). Then will resume 2g Na diet tomorrow.     Additionally: RN asking to schedule her PTA lactulose. Is on PTA Lactulose 30g TID. Will order. Titrate to 3-4BM per day. [place] on [facility] protocol.     Please contact Medicine provider on call overnight with any additional questions or concerns.     Venus Sears PA-C  Hospitalist Service  Pager [phone]

## 2021-11-08 NOTE — PLAN OF CARE
7B PT: Defer: per conversation with RN and pt, pt declines need for IP therapy. Pt reports no change in function since admission. Pt has been up walking in halls ind, no balance concerns. Pt reports supportive spouse at home, is not required to negotiate any stairs. Pt educated on OP PT as needed once discharged and encouraged ongoing activity to prevent deconditioning. Anticipate pt will be safe to discharge home when medically ready. Will complete orders and sign off.

## 2021-11-09 ENCOUNTER — TELEPHONE (OUTPATIENT)
Dept: GASTROENTEROLOGY | Facility: CLINIC | Age: 51
End: 2021-11-09
Payer: COMMERCIAL

## 2021-11-09 NOTE — DISCHARGE SUMMARY
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 Follow-up and recommended labs and tests      Follow up with primary care provider, Harleen Billy, within 7 days for   hospital follow- up.  The following labs/tests are recommended: CBC, CMP.    Follow up with Dr. Cook or his team as scheduled.       Appointments on [place] and/or [facility] (with [facility] or [facility]   provider or service). Call [phone] if you haven't heard regarding   these appointments within 7 days of discharge.             Unresulted Labs Ordered in the Past 30 Days of this Admission     Date and Time Order Name Status Description    11/6/2021  8:46 PM Blood Culture Arm, Right Preliminary     11/5/2021  6:45 AM Prepare red blood cells (unit) Preliminary     11/5/2021  6:45 AM Prepare red blood cells (unit) Preliminary       These results will be followed up by hospitalist pool.     Discharge Disposition   Discharged to home  Condition at discharge: Stable      Hospital Course    Susan Puckett is a 51 year old female with PMH of CUETO cirrhosis s/p TIPS (11/05/2021), G1 EV on 08/23/2020, type II DM, depressive disorder, s/p Faustino-en-Y gastric bypass surgery, and hypothyroidism who presented with nausea and back and abdominal pain after TIPS procedure.     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 Ammonia 60, no asterixis. Was not taking lactulose or rifaximin at home due to nausea. Likely mild HE precipitated by TIPS and not taking lactulose. History of grade 1 EV last EGD. No history of SBP. Doppler US of liver shows patent TIPS, cirrhotic liver. CT A/P with wall thickening of cecum, RS colon, and GB likely 2/2 edema. MELD 15 on admission, 12 on discharge. GI consulted, appreciate recs, felt all related to TIPS procedure. She was given lactulose with resolution of mild HE. CAPS team consulted for diagnostic para, no ascites to tap, low concern for SBP.  - Resume PTA rifaximin and lactulose and PTA diuretics  - F/up with Dr. Cook, GI will set up appointment    # Fever to 38.1C, resolved  Low grade temp to 38.1C 11/7 AM. No fever or chills at home. Symptoms only nausea, abd pain, back pain, HE. No respiratory or urinary symptoms. Other considerations other than infectious etiologies would be post-TIPS fever. Infectious workup negative, UA negative for infection, BCx NGTD, small R pleural effusion with hazy opacity throughout R lung related to effusion vs PNA. PNA less likely - no hypoxia, SOB, respiratory symptoms at all.  Given 1 dose of IV CTX 2 g while awaiting r/o SBP, but unable to obtain diagnostic para due to lack of ascites so low suspicion. Monitored off abx with no fever.     # Acute on chronic nausea, improving  Has persistent nausea at home managed by PRN Zofran, but was worsening and unable to take PO. Improved with antiemetics.    - Antiemetics PRN    #Type II DM  Missed her insulin doses at home PTA, she was concerned she would become hypoglycemic while unable to take PO. BS reportedly ~300s at  home. No evidence of DKA. Resumed home meds with improvement in BGs.     # Abdominal and back pain, improving  Complaints of low back pain (upper lumbar area), nonradiating longstanding but recently worsened after TIPS procedure.  No evidence of neurologic deficit.  Likely be due degenerative vertebral disease versus lumbar muscle sprain. Abd pain diffuse.  Improved through admission. Overall can be related to post TIPS.    # Normocytic/macrocytic anemia  # Thrombocytopenia  Hgb ~9s, was 10s-11s prior to TIPS. MCV 99-100s. Plts ~80k-100ks.  No evidence of bleeding. Related to liver disease. Downtrending during admission with no evidence of bleeding.   - Repeat CBC with PCP within 1 week    # Depression  - Continue PTA venlafaxine 75mg daily  - Atarax PRN for anxiety    # Hypothyroidism  - Continue PTA levothyroxine 200 mcg/day    Consultations This Hospital Stay   PHYSICAL THERAPY ADULT IP CONSULT  OCCUPATIONAL THERAPY ADULT IP CONSULT  VASCULAR ACCESS CARE ADULT IP CONSULT  INTERNAL MEDICINE PROCEDURE TEAM ADULT IP CONSULT [place] [facility]  GI HEPATOLOGY ADULT IP CONSULT    Code Status   Prior    Time Spent on this Encounter   ISis, personally saw the patient today and spent greater than 30 minutes discharging this patient.       Sis Weber (Eleanor Yepez MD  Internal Medicine/Pediatrics  Hospitalist      [facility] [address] [address]  Phone: [phone]  ______________________________________________________________________    Physical Exam   Vital Signs: Temp: 98.9  F (37.2  C) Temp src: Oral BP: 100/67 Pulse: 94   Resp: 15 SpO2: 94 % O2 Device: None (Room air)    Weight: 164 lbs 4.8 oz    General: awake, alert, in no acute distress  HEENT: NCAT, PERRL, sclera anicteric, no nasal discharge, MMM, posterior pharynx without erythema or exudates  CV: RRR  Resp: CTAB, no increased WOB  Abd: Soft, uncomfortable to deep palpation but not tender,  nondistended, +BS, no rebound or guarding  MSK: No peripheral edema, extremities warm and well perfused  Skin: warm, dry, no jaundice  Neuro: No focal deficits. Alert and oriented x4.       Primary Care Physician   Harleen Billy    Discharge Orders      Reason for your hospital stay    You were admitted to the hospital for nausea, vomiting, abdominal and back pain, all likely related to TIPS procedure.     Activity    Your activity upon discharge: activity as tolerated     Adult [facility] Follow-up and recommended labs and tests    Follow up with primary care provider, Harleen Billy, within 7 days for hospital follow- up.  The following labs/tests are recommended: CBC, CMP.    Follow up with Dr. Cook or his team as scheduled.       Appointments on [place] and/or [facility] (with [facility] or [facility] provider or service). Call [phone] if you haven't heard regarding these appointments within 7 days of discharge.     When to contact your care team    Call your primary doctor if you have any of the following: fever, chills, nausea/vomiting causing you to not be able to eat or drink, worsening abdominal pain, or any other concerning symptoms.     Diet    Follow this diet upon discharge: Orders Placed This Encounter      2 Gram Sodium Diet       Significant Results and Procedures   Most Recent 3 CBC's:Recent Labs   Lab Test 11/08/21  0748 11/06/21  1448 11/05/21  0701   WBC 3.9* 8.5 4.0   HGB 8.4* 9.9* 9.5*   MCV 98 99 101*   PLT 55* 101* 89*     Most Recent 3 BMP's:Recent Labs   Lab Test 11/08/21  1143 11/08/21  0844 11/08/21  0748 11/07/21  0909 11/07/21  0742 11/06/21  2206 11/06/21  2201 11/06/21  1735 11/06/21  1448   NA  --   --  138  --  136  136  --   --   --  135   POTASSIUM  --   --  3.6  --  3.8  --  3.8  --  3.7   CHLORIDE  --   --  107  --  105  --   --   --  101   CO2  --   --  28  --  27  --   --   --  28   BUN  --   --  7  --  9  --   --   --  9   CR  --   --  0.81  --  0.76  --   --   --  0.86    ANIONGAP  --   --  3  --  4  --   --   --  6   MAURI  --   --  7.6*  --  7.4*  --   --   --  7.7*   * 131* 130*   < > 131*   < >  --    < > 260*    < > = values in this interval not displayed.     Most Recent 2 LFT's:Recent Labs   Lab Test 11/08/21  0748 11/07/21  0742   * 281*   ALT 77* 104*   ALKPHOS 128 131   BILITOTAL 1.0 1.3     Most Recent 3 INR's:Recent Labs   Lab Test 11/08/21  0748 11/07/21  0742 11/06/21  1449   INR 1.66* 1.75* 1.61*     Most Recent 6 glucoses:Recent Labs   Lab Test 11/08/21  1143 11/08/21  0844 11/08/21  0748 11/08/21  0421 11/08/21  0032 11/07/21  1830   * 131* 130* 169* 157* 194*     Most Recent Urinalysis:Recent Labs   Lab Test 11/06/21  2101   COLOR Light Yellow   APPEARANCE Clear   URINEGLC Negative   URINEBILI Negative   URINEKETONE Negative   SG 1.010   UBLD Negative   URINEPH 6.0   PROTEIN 10 *   NITRITE Negative   LEUKEST Negative   RBCU 1   WBCU <1   ,   Results for orders placed or performed during the hospital encounter of 11/06/21   US TIPSS Doppler    Narrative    EXAMINATION: TEMPORARY, 11/6/2021 4:33 PM     COMPARISON: CT 11/6/2021    HISTORY: Abdominal and back pain after TIPS, evaluate for patency    TECHNIQUE:  Grey-scale, color Doppler and spectral flow analysis.    Findings:     Liver: Coarsened hepatic echotexture. No focal masses. The main portal  vein is patent with antegrade flow.    Gallbladder: No cholelithiasis. Diffuse gallbladder wall thickening  measuring up to 6 mm. No pericholecystic fluid or gallbladder wall  hyperemia.     Bile Ducts: Both the intra and extrahepatic biliary system are of  normal caliber with the common duct measuring 3 mm in diameter.    Pancreas: Pancreas obscured by overlying bowel gas and cannot be  evaluated on this exam.    Kidneys: The right kidney is of normal echotexture, without mass nor  hydronephrosis.   The craniocaudal dimensions are: right- 10 cm,. Left  kidney not visualized. Spleen: The spleen is  enlarged and measures  14.2 cm in sagittal dimension.    Aorta and IVC: The visualized portions of the aorta and IVC are  unremarkable.    Fluid: No definite ascites, however there is a trace amount of  perihepatic/subphrenic fluid on the same day CT.     DOPPLER:     There is flow toward the liver in the main portal vein:  30 cm/sec in the main portal vein  Retrograde at 11 cm/sec in the right portal vein  Left portal vein is not visualized. This was patent on the comparison  CT. There is flow toward the liver in the hepatic artery:  118 cm/sec peak systolic flow  38 cm/sec minimum diastolic flow   0.68 resistive index     The stent velocities are  45 cm/sec at the portal vein  65 cm/sec in mid stent  136 cm/sec in the hepatic vein distal shunt end.     The splenic vein is patent and flow is towards the liver.     The left, middle, and right hepatic veins are patent with flow towards  the IVC.       Impression    Impression:   1. Patent TIPS with sluggish flow but without hemodynamically  significant stenosis.  2. Antegrade flow in the main portal vein with retrograde flow in the  right portal vein. The left portal vein is not visualized, however was  patent on the same day CT. This is likely technical.  3. Cirrhotic configuration of the liver with evidence of portal  hypertension including splenomegaly.  4. Again seen is diffuse wall thickening of the gallbladder wall  without additional evidence of cholecystitis, likely reactive.    I have personally reviewed the examination and initial interpretation  and I agree with the findings.    CHRISTOS DONG MD         SYSTEM ID:  [id number]   CT Abdomen Pelvis w Contrast    Narrative    EXAM: CT ABDOMEN PELVIS W CONTRAST, 11/6/2021    TECHNIQUE:  Helical CT images from the lung bases through the  symphysis pubis were obtained after the administration of intravenous  contrast. Coronal and sagittal reformatted images were generated at a  workstation for further assessment.  Contrast dose: iopamidol  (ISOVUE-370) solution 99 mL.    HISTORY: Nausea/vomiting; Epigastric pain    COMPARISON: CT 2/26/2021, 12/7/2020, 11/22/2020.    FINDINGS:    LUNG BASES: Small right pleural effusion. No acute airspace disease.    ABDOMEN/PELVIS: Patent TIPS. Trace pneumobilia. Cirrhotic liver with  periportal edema. No focal hepatic lesion. Slight increased diffuse  gallbladder wall edema without surrounding inflammatory change. No  acute abnormality of the pancreas (there is fatty replacement),  spleen, adrenal glands, kidneys, or urinary bladder. The uterus and  ovaries are grossly unremarkable.     Gastric bypass with Faustino-en-Y reconstruction. The left normally  dilated loops of bowel. Segmental colonic wall thickening, new in the  rectosigmoid colon and similar in the cecum. Colonic diverticulosis  without definite acute diverticulitis. Trace free fluid in the right  paracolic gutter and pelvis. No free intra-abdominal air.  Nonaneurysmal abdominal aorta. Unchanged enlargement of multiple  retroperitoneal lymph nodes, largest measuring nearly 1.8 cm in short  axis near the caudate lobe. Numerous prominent enteric lymph nodes  have slightly increased in size. Stable presacral soft tissue  attenuation.    BONES / SOFT TISSUES: No acute skeletal lesion or suspicious skeletal  abnormality. Mild diffuse soft tissue edema.      Impression    IMPRESSION:   1. Slightly increased wall thickening of the cecum, rectosigmoid  colon, and gallbladder likely representing worsening edema in setting  of ascites, right pleural effusion, and soft tissue edema. Infection  is possible but less likely given normal white count.   2. Cirrhotic liver with patent TIPS.   3. Colonic diverticulosis without evidence for acute diverticulitis.   4. Unremarkable sequelae gastric bypass with Faustino-en-Y reconstruction.  No evidence of obstruction.    I have personally reviewed the examination and initial interpretation  and I agree with  the findings.    CHRISTOS DONG MD         SYSTEM ID:  [id number]   XR Chest Port 1 View    Narrative    Exam: XR CHEST PORT 1 VIEW, 11/7/2021 10:47 AM    Indication: fever, r/o PNA    Comparison: 12/5/2020    Findings:   Increase hazy opacity over the right thorax with blunting of the left  costophrenic angle. Low lung volumes. Heart and pulmonary vasculature  within normal limits.      Impression    Impression:   1. There is a new small right pleural effusion. The hazy opacity  throughout the right lung could be related to the effusion. Pulmonary  opacity from pneumonia isn't excluded.  2. Low lung volume accounts for the prominence of the pulmonary  vasculature.    CHRISTOS DONG MD         SYSTEM ID:  [id number]   POC US Guide for Paracentesis    Impression    POCUS Abdomen    Indication: Evaluate for ascites for safe site for paracentesis    Impression: no fluid pocket is a safe site for paracentesis    LLQ, LUQ, RLQ, RUQ no ascites present on my abd ultrasound.    Columba Ma MD   11/7/2021        Discharge Medications   Discharge Medication List as of 11/8/2021  2:44 PM      START taking these medications    Details   prochlorperazine (COMPAZINE) 10 MG tablet Take 1 tablet (10 mg) by mouth every 6 hours as needed for nausea or vomiting, Disp-120 tablet, R-1, E-Prescribe         CONTINUE these medications which have CHANGED    Details   acetaminophen (TYLENOL) 500 MG tablet Take 1 tablet (500 mg) by mouth every 6 hours as needed for mild pain, No Print Out      ondansetron (ZOFRAN-ODT) 4 MG ODT tab Place 1 tablet (4 mg) under the tongue every 6 hours as needed for nausea, Disp-120 tablet, R-1, E-Prescribe         CONTINUE these medications which have NOT CHANGED    Details   albuterol (PROAIR HFA/PROVENTIL HFA/VENTOLIN HFA) 108 (90 Base) MCG/ACT inhaler Inhale 1-2 puffs into the lungs, Historical      amylase-lipase-protease (CREON 6) 7506-04230-50064 units CPEP Take 3 capsules by mouth 3 times daily (with  meals), Historical      atorvastatin (LIPITOR) 20 MG tablet Take 20 mg by mouth every morning , Historical      calcium carbonate (TUMS) 500 MG chewable tablet Take 1 tablet by mouth daily as needed for heartburn , Historical      calcium citrate-vitamin D (CITRACAL W/D) 250-100 MG-UNIT tablet Take 2 tablets by mouth 2 times daily (with meals), Disp-336 tablet, R-3, E-Prescribe      cyanocobalamin (VITAMIN B-12) 1000 MCG tablet Take 1,000 mcg by mouth every 7 days , Historical      Ferrous Sulfate (IRON) 325 (65 FE) MG tablet Take 1 tablet by mouth every morning , Historical      folic acid (FOLVITE) 1 MG tablet Take 1 mg by mouth every morning , Historical      furosemide (LASIX) 40 MG tablet Take 40 mg by mouth 2 times daily , Historical      hydrOXYzine (ATARAX) 25 MG tablet Take 25 mg by mouth 3 times daily as needed for anxiety, Historical      lactulose (CHRONULAC) 10 GM/15ML solution TAKE 30 MLS BY MOUTH 3 TIMES DAILY, Disp-3784 mL, R-5, E-Prescribe      levothyroxine (SYNTHROID/LEVOTHROID) 200 MCG tablet Take 200 mcg by mouth, Historical      metoclopramide (REGLAN) 10 MG tablet Take 10 mg by mouth, Historical      Multiple Vitamin (MULTIVITAMIN PO) Take 2 tablets by mouth every morning , Historical      multivitamin (DEKAS ESSENTIAL) capsule Take 1 capsule by mouth daily , Historical      omeprazole (PRILOSEC) 20 MG DR capsule Take 1 capsule (20 mg) by mouth daily, Disp-30 capsule, R-0, E-Prescribe      polyethylene glycol (MIRALAX) 17 g packet Take 1 packet by mouth daily as needed for constipation, Historical      rifampin (RIFADIN) 300 MG capsule TAKE 1 CAPSULE BY MOUTH EVERY DAY, Disp-90 capsule, R-3, E-Prescribe      rifaximin (XIFAXAN) 550 MG TABS tablet Take 1 tablet (550 mg) by mouth 2 times daily, Disp-60 tablet, R-11, E-PrescribeCan give a 3 month supply if easier and/or cheaper for patient.      Rimegepant Sulfate 75 MG TBDP Take 75 mg by mouth, Historical      simethicone (MYLICON) 80 MG  chewable tablet Take 80 mg by mouth every 6 hours as needed for flatulence or cramping, Historical      sitagliptin (JANUVIA) 100 MG tablet Take 100 mg by mouth every morning , Historical      spironolactone (ALDACTONE) 100 MG tablet Take 2.5 tablets (250 mg) by mouth daily, Disp-75 tablet, R-11, E-Prescribe      SUMAtriptan (IMITREX) 50 MG tablet Take 50 mg by mouth at onset of headache for migraine , Historical      topiramate (TOPAMAX) 50 MG tablet Take 50 mg by mouth daily , Historical      traZODone (DESYREL) 100 MG tablet Take  mg by mouth nightly as needed for sleep, Historical      valACYclovir (VALTREX) 1000 mg tablet Take 1,000 mg by mouth daily as needed (at onset of cold sore) , Historical      venlafaxine (EFFEXOR) 75 MG tablet Take 1 tablet (75 mg) by mouth At Bedtime, Disp-30 tablet, R-0, E-PrescribeRenewal requests go to Brooke Billy with [facility].      vitamin A 3 MG (23619 UNITS) capsule Take 10,000 Units by mouth every morning , Historical      vitamin C (ASCORBIC ACID) 1000 MG TABS Historical      !! vitamin D3 (CHOLECALCIFEROL) 50 mcg (2000 units) tablet Take 1 tablet (50 mcg) by mouth daily, Disp-90 tablet, R-3, E-Prescribe      !! vitamin D3 (CHOLECALCIFEROL) 50 mcg (2000 units) tablet Take 1 tablet (50 mcg) by mouth daily, Disp-90 tablet, R-3, E-Prescribe      vitamin E (TOCOPHEROL) 400 units (180 mg) capsule Take 400 Units by mouth every morning , Historical      Vitamin K, Phytonadione, 100 MCG TABS Take 100 mcg by mouth every morning , Historical      acetone urine (KETOSTIX) test strip 1 stripHistorical      Continuous Blood Gluc  (DEXCOM G6 ) LUNA Use as directed for continuous glucose monitoring., Historical      Continuous Blood Gluc Sensor (DEXCOM G6 SENSOR) MISC Use as directed for continuous glucose monitoring.  Change sensor every 10 days., Historical      Continuous Blood Gluc Transmit (DEXCOM G6 TRANSMITTER) MISC Use as directed for continuous glucose  monitoring.  Change every 3 months., Historical      insulin aspart (NOVOLOG FLEXPEN) 100 UNIT/ML pen Inject 1u/50>200 every 4 hours as needed for high blood glucose. Up to 20 units daily.  Indications: Diabetes Mellitus, Historical      insulin glargine (LANTUS PEN) 100 UNIT/ML pen Inject 12 Units Subcutaneous every morning Takes around 12noon, HistoricalIf Lantus is not covered by insurance, may substitute Basaglar at same dose and frequency.        insulin pen needle (BD GRISEL U/F) 32G X 4 MM miscellaneous Inject 1 each SubcutaneousHistorical       !! - Potential duplicate medications found. Please discuss with provider.        Allergies   Allergies   Allergen Reactions     Methylprednisolone Hives     Droperidol Anxiety     Nsaids Other (See Comments)     GI bleed.     Prednisone Anxiety, Hives and Rash

## 2021-11-10 ENCOUNTER — TELEPHONE (OUTPATIENT)
Dept: VASCULAR SURGERY | Facility: CLINIC | Age: 51
End: 2021-11-10
Payer: COMMERCIAL

## 2021-11-10 ENCOUNTER — PATIENT OUTREACH (OUTPATIENT)
Dept: GASTROENTEROLOGY | Facility: CLINIC | Age: 51
End: 2021-11-10
Payer: COMMERCIAL

## 2021-11-10 DIAGNOSIS — Z95.828 S/P TIPS (TRANSJUGULAR INTRAHEPATIC PORTOSYSTEMIC SHUNT): Primary | ICD-10-CM

## 2021-11-10 DIAGNOSIS — K75.81 LIVER CIRRHOSIS SECONDARY TO NASH (H): Primary | ICD-10-CM

## 2021-11-10 DIAGNOSIS — K74.60 LIVER CIRRHOSIS SECONDARY TO NASH (H): Primary | ICD-10-CM

## 2021-11-10 NOTE — PROGRESS NOTES
Hepatology post hospital discharge RNCC assessment    Post hospital discharge follow up:      1. Admission diagnosis:  Nausea, back and abdominal pain after TIPS procedure   2. Discharge diagnosis:  Hepatic encephalopathy, elevated AST/ALT s/p TIPS, acute on chronic nausea, and abdominal and back pain s/p TIPS    3. Admitted on: 11/6/21  4. Discharged on: 11/8/21    Medication Review    1. Medication review completed.    2. Discharge medication changes reviewed.   3. Patient did have new medications prescribed in hospital.  - Compazine 10 mg tablet, Take 1 tablet by mouth every 6 hours as needed for nausea or vomiting     Follow Up Post Discharge    1. Reviewed discharge instructions with patient. Follow up appointments reviewed and transportation to appointments confirmed.   2. Patient able to verbalized understanding of discharge instructions including medications and follow up.      3. Care coordinator role and contact information reviewed, and pt encouraged to call with questions or concerns.     Symptom Review    1.  Ascites:  No ascites found on imaging on 11/7  2.  Encephalopathy:  Pt encephalopathic during admission, resolved on discharge. Pt denied confusion and is taking lactulose and xifaxan as prescribed. Pt is having 3-5 BMs/day with current lactulose regimen (lactulose 30 ml 3 times daily).   3.  Hepatic hydrothorax:  Pt is s/p TIPS on 11/5. Pt should continue to schedule outpatient thoracentesis as needed and has a thoracentesis scheduled on 11/12. Pt reported intermittent coughing and shortness of breath but stated that symptoms have improved since TIPS.  4. Edema: Pt reported pitting edema in lower extremities. Reviewed that lower extremity edema is common after TIPS and discussed the importance of compliance with diuretics, following low Na diet, and elevating legs throughout the day.     Plan    1. Pt scheduled for hospital follow up appointment with Shahzad Hatch on 11/16.  2. Pt has appointment  with PCP on 11/16.      Patient was given an opportunity to ask questions and have those questions answered to her satisfaction.  Patient verbalized understanding of instructions provided and agreed to plan of care.

## 2021-11-10 NOTE — TELEPHONE ENCOUNTER
I called and left a voicemail including my call back number.  I called to find out how she is doing after TIPS placement.  MARY ANN Philippe RN, BSN  Interventional Radiology Nurse Coordinator   Phone:  [phone]

## 2021-11-11 NOTE — TELEPHONE ENCOUNTER
I called and spoke with Susan.  She was pretty nauseated and had abd pain day after the procedure. She ended up going to ED.  She feels better as of Sunday.  She is very bruised and gets stiff at the end of day.  She is getting better.  She is noticing since TIPS she has increase edema in her feet and legs at the end of the day.  She does have compression stockings and was encouraged to wear during the day.  I will send a message to Dr Hoang team regarding f/up and this concern.  She is scheduled for a th[facility] tomorrow at [facility], and feels some chest tightness and slight coughing at night, which is what she experiences when she needs fluid drained.  Plan is for 1 month follow up.    All questions answered.  MARY ANN Philippe RN, BSN  Interventional Radiology Nurse Coordinator   Phone:  [phone]

## 2021-11-12 LAB — BACTERIA BLD CULT: NO GROWTH

## 2021-11-16 ENCOUNTER — OFFICE VISIT (OUTPATIENT)
Dept: GASTROENTEROLOGY | Facility: CLINIC | Age: 51
End: 2021-11-16
Attending: PHYSICIAN ASSISTANT
Payer: COMMERCIAL

## 2021-11-16 ENCOUNTER — PATIENT OUTREACH (OUTPATIENT)
Dept: GASTROENTEROLOGY | Facility: CLINIC | Age: 51
End: 2021-11-16

## 2021-11-16 VITALS
HEIGHT: 67 IN | HEART RATE: 85 BPM | SYSTOLIC BLOOD PRESSURE: 105 MMHG | DIASTOLIC BLOOD PRESSURE: 72 MMHG | BODY MASS INDEX: 25.74 KG/M2 | WEIGHT: 164 LBS | TEMPERATURE: 97.4 F

## 2021-11-16 DIAGNOSIS — K75.81 LIVER CIRRHOSIS SECONDARY TO NASH (H): Primary | ICD-10-CM

## 2021-11-16 DIAGNOSIS — K74.60 LIVER CIRRHOSIS SECONDARY TO NASH (H): Primary | ICD-10-CM

## 2021-11-16 PROCEDURE — 99214 OFFICE O/P EST MOD 30 MIN: CPT | Performed by: PHYSICIAN ASSISTANT

## 2021-11-16 RX ORDER — FUROSEMIDE 40 MG
TABLET ORAL
Qty: 90 TABLET | Refills: 3 | Status: ON HOLD | OUTPATIENT
Start: 2021-11-16 | End: 2021-12-09

## 2021-11-16 ASSESSMENT — MIFFLIN-ST. JEOR: SCORE: 1401.53

## 2021-11-16 ASSESSMENT — PAIN SCALES - GENERAL: PAINLEVEL: NO PAIN (0)

## 2021-11-16 NOTE — NURSING NOTE
"Chief Complaint   Patient presents with     RECHECK     General Liver     Vital signs:  Temp: 97.4  F (36.3  C) Temp src: Oral BP: 105/72 Pulse: 85           Height: 170.2 cm (5' 7\") Weight: 74.4 kg (164 lb)  Estimated body mass index is 25.69 kg/m  as calculated from the following:    Height as of this encounter: 1.702 m (5' 7\").    Weight as of this encounter: 74.4 kg (164 lb).    Ginny Blackwell, EMT    "

## 2021-11-16 NOTE — LETTER
11/16/2021     RE: Susan Puckett  [address] [facility] [id number]        Dear Colleague,    Thank you for referring your patient, Susan Puckett, to the [facility] [place]. Please see a copy of my visit note below.    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 She is listed for liver transplantation, low MELD-Na 12. Reviewed importance of lactulose post TIPS, which she is taking appropriately now. She is have some worsening pitting RADHA, but did not enough fluid for thoracentesis last week. She is up to date with HCC Screening and will not need variceal screening with patent TIPs.     # Labs pending at the time of visit     # Hepatic encephalopathy:   - Continue lactulose daily (dose to 3-5 bowel movements a day)    - Rifaximin 550 mg twice     # History of hepatic hydrothorax s/p TIPs, improved /  RADHA:   - Increase furosemide to 80 mg morning and 40 mg afternoon  - Continue 250 mg spironolactone daily   - Repeat BMP in one week   - Follow up with IR as previously scheduled    # Continue optimization with mental health    # Dysphagia:   - Continue monitor, she will discuss with her [facility] MD     # Continue optimization of medical co-morbidities     # Follow-up in clinic with Dr. Cook in January as previously scheduled     Shahzad Hatch PA-C   [facility]    -----------------------------------------------------       HPI:   Susan Puckett is a 51 year old female presenting for follow-up.     CUETO Cirrhosis  Complicated by:  - History of hepatic hydrothorax s/p TIPs on 11/5/2021  - History of hepatic encephalopathy   EGD: 8/23/2020, Grade I EV  "  HCC: 11/6/2021    Recently hospitalized for hepatic encephalopathy soon after recent TIPs placement and not taking     Sleeping improved, still tired. Appetite is not good. Nothing tastes good. Overall nausea improved, no nausea medication since hospitalization.     Says swallowing is challenging, mostly meats. Liquids seem to be okay. Feels like \"going down wrong tube\" more frequently. Still sore in neck right side of neck.     No problems confusion. Taking lactulose 30 mL three a day. Having four bowel movements a day.      Legs and ankle are swelling more since hospitalizations. Take spironolactone 250 mg daily and Furosemide 40 twice daily.     Had an X-ray last week, but not significant amount of pleural fluid to drain.     Patient denies jaundice, abdominal distension or confusion.  Patient also denies melena, hematochezia or hematemesis.  Patient denies fevers, sweats or chills. Feel like gaining weight, attributes some of that her depression.     Seeing a therapist, but has been a few weeks and seeing a psychiatrist (twice).     Medical hx Surgical hx   Past Medical History:   Diagnosis Date     Anemia      Anxiety      Cold sore      Depressive disorder      Diabetes (H)      Encephalopathy      Esophageal varices without bleeding (H)      History of blood transfusion      History of seizure      Insomnia      Liver cirrhosis secondary to CUETO (H) 05/28/2020     Migraine      Pleural effusion      Thrombocytopenia (H)      Thyroid disease     Past Surgical History:   Procedure Laterality Date     APPENDECTOMY      1989     COLONOSCOPY      1/2020 at [facility]      ENT SURGERY  1998    tempanoplasty     GI SURGERY      EGD August 2020 at [facility]      GYN SURGERY  2010    laparoscopic ablation     IR TRANSVEN INTRAHEPATIC PORTOSYST SHUNT  11/5/2021     MAMMOPLASTY REDUCTION BILATERAL  2004     [place] STAB PHELBECTOMY VARICOSE VEINS, LESS THAN 10 INCISIONS, ONE EXTREMITY  2020     RNY gastric bypass  " 01/2006    retoocolic, retrogastric, [facility]     TONSILLECTOMY & ADENOIDECTOMY Bilateral 1978     WISDOM TEETH EXTRACTION                   Medications:     Current Outpatient Medications   Medication     acetaminophen (TYLENOL) 500 MG tablet     acetone urine (KETOSTIX) test strip     amylase-lipase-protease (CREON 6) 9652-92815-03535 units CPEP     atorvastatin (LIPITOR) 20 MG tablet     calcium carbonate (TUMS) 500 MG chewable tablet     calcium citrate-vitamin D (CITRACAL W/D) 250-100 MG-UNIT tablet     Continuous Blood Gluc  (DEXCOM G6 ) LUNA     Continuous Blood Gluc Sensor (DEXCOM G6 SENSOR) MISC     Continuous Blood Gluc Transmit (DEXCOM G6 TRANSMITTER) MISC     cyanocobalamin (VITAMIN B-12) 1000 MCG tablet     Ferrous Sulfate (IRON) 325 (65 FE) MG tablet     folic acid (FOLVITE) 1 MG tablet     furosemide (LASIX) 40 MG tablet     hydrOXYzine (ATARAX) 25 MG tablet     insulin aspart (NOVOLOG FLEXPEN) 100 UNIT/ML pen     insulin glargine (LANTUS PEN) 100 UNIT/ML pen     insulin pen needle (BD GRISEL U/F) 32G X 4 MM miscellaneous     lactulose (CHRONULAC) 10 GM/15ML solution     levothyroxine (SYNTHROID/LEVOTHROID) 200 MCG tablet     metoclopramide (REGLAN) 10 MG tablet     Multiple Vitamin (MULTIVITAMIN PO)     multivitamin (DEKAS ESSENTIAL) capsule     omeprazole (PRILOSEC) 20 MG DR capsule     ondansetron (ZOFRAN-ODT) 4 MG ODT tab     polyethylene glycol (MIRALAX) 17 g packet     prochlorperazine (COMPAZINE) 10 MG tablet     rifampin (RIFADIN) 300 MG capsule     rifaximin (XIFAXAN) 550 MG TABS tablet     Rimegepant Sulfate 75 MG TBDP     simethicone (MYLICON) 80 MG chewable tablet     sitagliptin (JANUVIA) 100 MG tablet     spironolactone (ALDACTONE) 100 MG tablet     SUMAtriptan (IMITREX) 50 MG tablet     topiramate (TOPAMAX) 50 MG tablet     traZODone (DESYREL) 100 MG tablet     valACYclovir (VALTREX) 1000 mg tablet     venlafaxine (EFFEXOR) 75 MG tablet     vitamin A 3 MG (39366  "UNITS) capsule     vitamin C (ASCORBIC ACID) 1000 MG TABS     vitamin D3 (CHOLECALCIFEROL) 50 mcg (2000 units) tablet     vitamin D3 (CHOLECALCIFEROL) 50 mcg (2000 units) tablet     vitamin E (TOCOPHEROL) 400 units (180 mg) capsule     Vitamin K, Phytonadione, 100 MCG TABS     albuterol (PROAIR HFA/PROVENTIL HFA/VENTOLIN HFA) 108 (90 Base) MCG/ACT inhaler     No current facility-administered medications for this visit.            Allergies:     Allergies   Allergen Reactions     Methylprednisolone Hives     Droperidol Anxiety     Nsaids Other (See Comments)     GI bleed.     Prednisone Anxiety, Hives and Rash            Review of Systems:   10 points ROS was obtained and highlighted in the HPI, otherwise negative.          Physical Exam:   VS:  /72 (BP Location: Right arm, Patient Position: Sitting, Cuff Size: Adult Regular)   Pulse 85   Temp 97.4  F (36.3  C) (Oral)   Ht 1.702 m (5' 7\")   Wt 74.4 kg (164 lb)   BMI 25.69 kg/m        Gen: A&Ox3, NAD, well developed  HEENT: non-icteric  CV: RRR, no overt murmurs  Lung: CTA Bilatererally, no wheezing or crackles.   Lym- no palpable lymphadenopathy  Abd: soft, NT, ND, No overt ascites  Ext: no edema, intact pulses.   Skin: No rash, no palmar erythema, telangiectasias or jaundice  Neuro: grossly intact, no asterixis   Psych: appropriate mood, tearful           Data:   Reviewed in person and significant for:    Lab Results   Component Value Date     11/08/2021     07/05/2021      Lab Results   Component Value Date    POTASSIUM 3.6 11/08/2021    POTASSIUM 3.0 07/05/2021     Lab Results   Component Value Date    CHLORIDE 107 11/08/2021    CHLORIDE 107 07/05/2021     Lab Results   Component Value Date    CO2 28 11/08/2021    CO2 29 07/05/2021     Lab Results   Component Value Date    BUN 7 11/08/2021    BUN 11 07/05/2021     Lab Results   Component Value Date    CR 0.81 11/08/2021    CR 1.10 07/05/2021       Lab Results   Component Value Date    WBC " 3.9 11/08/2021    WBC 4.0 07/05/2021     Lab Results   Component Value Date    HGB 8.4 11/08/2021    HGB 11.5 07/05/2021     Lab Results   Component Value Date    HCT 25.7 11/08/2021    HCT 34.4 07/05/2021     Lab Results   Component Value Date    MCV 98 11/08/2021    MCV 97 07/05/2021     Lab Results   Component Value Date    PLT 55 11/08/2021     07/05/2021       Lab Results   Component Value Date     11/08/2021    AST 33 07/05/2021     Lab Results   Component Value Date    ALT 77 11/08/2021    ALT 24 07/05/2021     No results found for: BILICONJ   Lab Results   Component Value Date    BILITOTAL 1.0 11/08/2021    BILITOTAL 0.5 07/05/2021       Lab Results   Component Value Date    ALBUMIN 1.5 11/08/2021    ALBUMIN 2.6 07/05/2021     Lab Results   Component Value Date    PROTTOTAL 4.8 11/08/2021    PROTTOTAL 6.9 07/05/2021      Lab Results   Component Value Date    ALKPHOS 128 11/08/2021    ALKPHOS 133 07/05/2021       Lab Results   Component Value Date    INR 1.66 11/08/2021    INR 1.18 07/05/2021     US TIPSS DOPPLER:   11/6/2021:  COMPARISON: CT 11/6/2021     HISTORY: Abdominal and back pain after TIPS, evaluate for patency     TECHNIQUE:  Grey-scale, color Doppler and spectral flow analysis.     Findings:      Liver: Coarsened hepatic echotexture. No focal masses. The main portal  vein is patent with antegrade flow.     Gallbladder: No cholelithiasis. Diffuse gallbladder wall thickening  measuring up to 6 mm. No pericholecystic fluid or gallbladder wall  hyperemia.      Bile Ducts: Both the intra and extrahepatic biliary system are of  normal caliber with the common duct measuring 3 mm in diameter.     Pancreas: Pancreas obscured by overlying bowel gas and cannot be  evaluated on this exam.     Kidneys: The right kidney is of normal echotexture, without mass nor  hydronephrosis.   The craniocaudal dimensions are: right- 10 cm,. Left  kidney not visualized. Spleen: The spleen is enlarged and  measures  14.2 cm in sagittal dimension.     Aorta and IVC: The visualized portions of the aorta and IVC are  unremarkable.     Fluid: No definite ascites, however there is a trace amount of  perihepatic/subphrenic fluid on the same day CT.      DOPPLER:      There is flow toward the liver in the main portal vein:  30 cm/sec in the main portal vein  Retrograde at 11 cm/sec in the right portal vein  Left portal vein is not visualized. This was patent on the comparison  CT. There is flow toward the liver in the hepatic artery:  118 cm/sec peak systolic flow  38 cm/sec minimum diastolic flow   0.68 resistive index      The stent velocities are  45 cm/sec at the portal vein  65 cm/sec in mid stent  136 cm/sec in the hepatic vein distal shunt end.      The splenic vein is patent and flow is towards the liver.      The left, middle, and right hepatic veins are patent with flow towards  the IVC.                                                                       Impression:   1. Patent TIPS with sluggish flow but without hemodynamically  significant stenosis.  2. Antegrade flow in the main portal vein with retrograde flow in the  right portal vein. The left portal vein is not visualized, however was  patent on the same day CT. This is likely technical.  3. Cirrhotic configuration of the liver with evidence of portal  hypertension including splenomegaly.  4. Again seen is diffuse wall thickening of the gallbladder wall  without additional evidence of cholecystitis, likely reactive.    Again, thank you for allowing me to participate in the care of your patient.      Sincerely,      Shahzad Hatch PA-C

## 2021-11-16 NOTE — PROGRESS NOTES
Pt had hepatology follow up with Shahzad Hatch on 11/16. Plan per Shahzad:    1) Increase lasix to 80 mg in the am and 40 mg in the pm- updated prescription sent to pt's pharmacy  2) Continue 250 mg spironolactone daily.   3) Repeat BMP in one week- lab order faxed to PCP  4) Continue lactulose and xifaxan.  5) Follow up with IR as previously scheduled   6) Continue optimization with mental health  7) Follow up with Dr. Cook in January      Met with pt during clinic visit and reviewed plan of care. Pt had thoracentesis appointment on 11/12 and imaging showed no pleural effusion. Pt has another thoracentesis scheduled for later this week. Pitting lower extremity edema found on assessment and pt will increase lasix as instructed. Pt aware to get labs checked in 1 week.

## 2021-11-16 NOTE — PROGRESS NOTES
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 She is listed for liver transplantation, low MELD-Na 12. Reviewed importance of lactulose post TIPS, which she is taking appropriately now. She is have some worsening pitting RADAH, but did not enough fluid for thoracentesis last week. She is up to date with HCC Screening and will not need variceal screening with patent TIPs.     # Labs pending at the time of visit     # Hepatic encephalopathy:   - Continue lactulose daily (dose to 3-5 bowel movements a day)    - Rifaximin 550 mg twice     # History of hepatic hydrothorax s/p TIPs, improved /  RADHA:   - Increase furosemide to 80 mg morning and 40 mg afternoon  - Continue 250 mg spironolactone daily   - Repeat BMP in one week   - Follow up with IR as previously scheduled    # Continue optimization with mental health    # Dysphagia:   - Continue monitor, she will discuss with her [facility] MD     # Continue optimization of medical co-morbidities     # Follow-up in clinic with Dr. Cook in January as previously scheduled     Shahzad Hatch PA-C   [facility]    -----------------------------------------------------       HPI:   Susan Puckett is a 51 year old female presenting for follow-up.     CUETO Cirrhosis  Complicated by:  - History of hepatic hydrothorax s/p TIPs on 11/5/2021  - History of hepatic encephalopathy   EGD: 8/23/2020, Grade I EV   HCC: 11/6/2021    Recently hospitalized for hepatic encephalopathy soon after recent TIPs placement and not taking     Sleeping improved, still tired. Appetite is not good. Nothing tastes good. Overall nausea improved, no nausea medication since  "hospitalization.     Says swallowing is challenging, mostly meats. Liquids seem to be okay. Feels like \"going down wrong tube\" more frequently. Still sore in neck right side of neck.     No problems confusion. Taking lactulose 30 mL three a day. Having four bowel movements a day.      Legs and ankle are swelling more since hospitalizations. Take spironolactone 250 mg daily and Furosemide 40 twice daily.     Had an X-ray last week, but not significant amount of pleural fluid to drain.     Patient denies jaundice, abdominal distension or confusion.  Patient also denies melena, hematochezia or hematemesis.  Patient denies fevers, sweats or chills. Feel like gaining weight, attributes some of that her depression.     Seeing a therapist, but has been a few weeks and seeing a psychiatrist (twice).     Medical hx Surgical hx   Past Medical History:   Diagnosis Date     Anemia      Anxiety      Cold sore      Depressive disorder      Diabetes (H)      Encephalopathy      Esophageal varices without bleeding (H)      History of blood transfusion      History of seizure      Insomnia      Liver cirrhosis secondary to CUETO (H) 05/28/2020     Migraine      Pleural effusion      Thrombocytopenia (H)      Thyroid disease     Past Surgical History:   Procedure Laterality Date     APPENDECTOMY      1989     COLONOSCOPY      1/2020 at [facility]      ENT SURGERY  1998    tempanoplasty     GI SURGERY      EGD August 2020 at [facility]      GYN SURGERY  2010    laparoscopic ablation     IR TRANSVEN INTRAHEPATIC PORTOSYST SHUNT  11/5/2021     MAMMOPLASTY REDUCTION BILATERAL  2004     [place] STAB PHELBECTOMY VARICOSE VEINS, LESS THAN 10 INCISIONS, ONE EXTREMITY  2020     RNY gastric bypass  01/2006    retoocolic, retrogastric, [facility]     TONSILLECTOMY & ADENOIDECTOMY Bilateral 1978     WISDOM TEETH EXTRACTION                   Medications:     Current Outpatient Medications   Medication     acetaminophen (TYLENOL) 500 MG tablet     " acetone urine (KETOSTIX) test strip     amylase-lipase-protease (CREON 6) [id number] units CPEP     atorvastatin (LIPITOR) 20 MG tablet     calcium carbonate (TUMS) 500 MG chewable tablet     calcium citrate-vitamin D (CITRACAL W/D) 250-100 MG-UNIT tablet     Continuous Blood Gluc  (DEXCOM G6 ) LUNA     Continuous Blood Gluc Sensor (DEXCOM G6 SENSOR) MISC     Continuous Blood Gluc Transmit (DEXCOM G6 TRANSMITTER) MISC     cyanocobalamin (VITAMIN B-12) 1000 MCG tablet     Ferrous Sulfate (IRON) 325 (65 FE) MG tablet     folic acid (FOLVITE) 1 MG tablet     furosemide (LASIX) 40 MG tablet     hydrOXYzine (ATARAX) 25 MG tablet     insulin aspart (NOVOLOG FLEXPEN) 100 UNIT/ML pen     insulin glargine (LANTUS PEN) 100 UNIT/ML pen     insulin pen needle (BD GRISEL U/F) 32G X 4 MM miscellaneous     lactulose (CHRONULAC) 10 GM/15ML solution     levothyroxine (SYNTHROID/LEVOTHROID) 200 MCG tablet     metoclopramide (REGLAN) 10 MG tablet     Multiple Vitamin (MULTIVITAMIN PO)     multivitamin (DEKAS ESSENTIAL) capsule     omeprazole (PRILOSEC) 20 MG DR capsule     ondansetron (ZOFRAN-ODT) 4 MG ODT tab     polyethylene glycol (MIRALAX) 17 g packet     prochlorperazine (COMPAZINE) 10 MG tablet     rifampin (RIFADIN) 300 MG capsule     rifaximin (XIFAXAN) 550 MG TABS tablet     Rimegepant Sulfate 75 MG TBDP     simethicone (MYLICON) 80 MG chewable tablet     sitagliptin (JANUVIA) 100 MG tablet     spironolactone (ALDACTONE) 100 MG tablet     SUMAtriptan (IMITREX) 50 MG tablet     topiramate (TOPAMAX) 50 MG tablet     traZODone (DESYREL) 100 MG tablet     valACYclovir (VALTREX) 1000 mg tablet     venlafaxine (EFFEXOR) 75 MG tablet     vitamin A 3 MG (49863 UNITS) capsule     vitamin C (ASCORBIC ACID) 1000 MG TABS     vitamin D3 (CHOLECALCIFEROL) 50 mcg (2000 units) tablet     vitamin D3 (CHOLECALCIFEROL) 50 mcg (2000 units) tablet     vitamin E (TOCOPHEROL) 400 units (180 mg) capsule     Vitamin K,  "Phytonadione, 100 MCG TABS     albuterol (PROAIR HFA/PROVENTIL HFA/VENTOLIN HFA) 108 (90 Base) MCG/ACT inhaler     No current facility-administered medications for this visit.            Allergies:     Allergies   Allergen Reactions     Methylprednisolone Hives     Droperidol Anxiety     Nsaids Other (See Comments)     GI bleed.     Prednisone Anxiety, Hives and Rash            Review of Systems:   10 points ROS was obtained and highlighted in the HPI, otherwise negative.          Physical Exam:   VS:  /72 (BP Location: Right arm, Patient Position: Sitting, Cuff Size: Adult Regular)   Pulse 85   Temp 97.4  F (36.3  C) (Oral)   Ht 1.702 m (5' 7\")   Wt 74.4 kg (164 lb)   BMI 25.69 kg/m        Gen: A&Ox3, NAD, well developed  HEENT: non-icteric  CV: RRR, no overt murmurs  Lung: CTA Bilatererally, no wheezing or crackles.   Lym- no palpable lymphadenopathy  Abd: soft, NT, ND, No overt ascites  Ext: no edema, intact pulses.   Skin: No rash, no palmar erythema, telangiectasias or jaundice  Neuro: grossly intact, no asterixis   Psych: appropriate mood, tearful           Data:   Reviewed in person and significant for:    Lab Results   Component Value Date     11/08/2021     07/05/2021      Lab Results   Component Value Date    POTASSIUM 3.6 11/08/2021    POTASSIUM 3.0 07/05/2021     Lab Results   Component Value Date    CHLORIDE 107 11/08/2021    CHLORIDE 107 07/05/2021     Lab Results   Component Value Date    CO2 28 11/08/2021    CO2 29 07/05/2021     Lab Results   Component Value Date    BUN 7 11/08/2021    BUN 11 07/05/2021     Lab Results   Component Value Date    CR 0.81 11/08/2021    CR 1.10 07/05/2021       Lab Results   Component Value Date    WBC 3.9 11/08/2021    WBC 4.0 07/05/2021     Lab Results   Component Value Date    HGB 8.4 11/08/2021    HGB 11.5 07/05/2021     Lab Results   Component Value Date    HCT 25.7 11/08/2021    HCT 34.4 07/05/2021     Lab Results   Component Value Date    " MCV 98 11/08/2021    MCV 97 07/05/2021     Lab Results   Component Value Date    PLT 55 11/08/2021     07/05/2021       Lab Results   Component Value Date     11/08/2021    AST 33 07/05/2021     Lab Results   Component Value Date    ALT 77 11/08/2021    ALT 24 07/05/2021     No results found for: BILICONJ   Lab Results   Component Value Date    BILITOTAL 1.0 11/08/2021    BILITOTAL 0.5 07/05/2021       Lab Results   Component Value Date    ALBUMIN 1.5 11/08/2021    ALBUMIN 2.6 07/05/2021     Lab Results   Component Value Date    PROTTOTAL 4.8 11/08/2021    PROTTOTAL 6.9 07/05/2021      Lab Results   Component Value Date    ALKPHOS 128 11/08/2021    ALKPHOS 133 07/05/2021       Lab Results   Component Value Date    INR 1.66 11/08/2021    INR 1.18 07/05/2021     US TIPSS DOPPLER:   11/6/2021:  COMPARISON: CT 11/6/2021     HISTORY: Abdominal and back pain after TIPS, evaluate for patency     TECHNIQUE:  Grey-scale, color Doppler and spectral flow analysis.     Findings:      Liver: Coarsened hepatic echotexture. No focal masses. The main portal  vein is patent with antegrade flow.     Gallbladder: No cholelithiasis. Diffuse gallbladder wall thickening  measuring up to 6 mm. No pericholecystic fluid or gallbladder wall  hyperemia.      Bile Ducts: Both the intra and extrahepatic biliary system are of  normal caliber with the common duct measuring 3 mm in diameter.     Pancreas: Pancreas obscured by overlying bowel gas and cannot be  evaluated on this exam.     Kidneys: The right kidney is of normal echotexture, without mass nor  hydronephrosis.   The craniocaudal dimensions are: right- 10 cm,. Left  kidney not visualized. Spleen: The spleen is enlarged and measures  14.2 cm in sagittal dimension.     Aorta and IVC: The visualized portions of the aorta and IVC are  unremarkable.     Fluid: No definite ascites, however there is a trace amount of  perihepatic/subphrenic fluid on the same day CT.      DOPPLER:       There is flow toward the liver in the main portal vein:  30 cm/sec in the main portal vein  Retrograde at 11 cm/sec in the right portal vein  Left portal vein is not visualized. This was patent on the comparison  CT. There is flow toward the liver in the hepatic artery:  118 cm/sec peak systolic flow  38 cm/sec minimum diastolic flow   0.68 resistive index      The stent velocities are  45 cm/sec at the portal vein  65 cm/sec in mid stent  136 cm/sec in the hepatic vein distal shunt end.      The splenic vein is patent and flow is towards the liver.      The left, middle, and right hepatic veins are patent with flow towards  the IVC.                                                                       Impression:   1. Patent TIPS with sluggish flow but without hemodynamically  significant stenosis.  2. Antegrade flow in the main portal vein with retrograde flow in the  right portal vein. The left portal vein is not visualized, however was  patent on the same day CT. This is likely technical.  3. Cirrhotic configuration of the liver with evidence of portal  hypertension including splenomegaly.  4. Again seen is diffuse wall thickening of the gallbladder wall  without additional evidence of cholecystitis, likely reactive.

## 2021-11-17 ENCOUNTER — TELEPHONE (OUTPATIENT)
Dept: VASCULAR SURGERY | Facility: CLINIC | Age: 51
End: 2021-11-17
Payer: COMMERCIAL

## 2021-11-17 DIAGNOSIS — Z95.828 S/P TIPS (TRANSJUGULAR INTRAHEPATIC PORTOSYSTEMIC SHUNT): Primary | ICD-10-CM

## 2021-11-23 ENCOUNTER — TELEPHONE (OUTPATIENT)
Dept: TRANSPLANT | Facility: CLINIC | Age: 51
End: 2021-11-23

## 2021-11-23 ENCOUNTER — E-VISIT (OUTPATIENT)
Dept: URGENT CARE | Facility: URGENT CARE | Age: 51
End: 2021-11-23
Payer: COMMERCIAL

## 2021-11-23 DIAGNOSIS — Z20.822 SUSPECTED COVID-19 VIRUS INFECTION: Primary | ICD-10-CM

## 2021-11-23 PROCEDURE — 99421 OL DIG E/M SVC 5-10 MIN: CPT | Performed by: PHYSICIAN ASSISTANT

## 2021-11-23 NOTE — PATIENT INSTRUCTIONS
Dear Susan Puckett,    Your symptoms show that you may have coronavirus (COVID-19). This illness can cause fever, cough and trouble breathing. Many people get a mild case and get better on their own. Some people can get very sick.    Will I be tested for COVID-19?  We would like to test you for Covid-19 virus. I have placed orders for this test.     To schedule: go to your [employer] home page and scroll down to the section that says  You have an appointment that needs to be scheduled  and click the large green button that says  Schedule Now  and follow the steps to find the next available openings.    If you are unable to complete these [employer] scheduling steps, please call [phone] to schedule your testing.     Return to work/school/ guidance:  Please let your workplace manager and staffing office know when your quarantine ends     We can t give you an exact date as it depends on the above. You can calculate this on your own or work with your manager/staffing office to calculate this. (For example if you were exposed on 10/4, you would have to quarantine for 14 full days. That would be through 10/18. You could return on 10/19.)      If you receive a positive COVID-19 test result, follow the guidance of the those who are giving you the results. Usually the return to work is 10 (or in some cases 20 days from symptom onset.) If you work at [facility], you must also be cleared by Employee Occupational Health and Safety to return to work.        If you receive a negative COVID-19 test result and did not have a high risk exposure to someone with a known positive COVID-19 test, you can return to work once you're free of fever for 24 hours without fever-reducing medication and your symptoms are improving or resolved.      If you receive a negative COVID-19 test and If you had a high risk exposure to someone who has tested positive for COVID-19 then you can return to work 14 days after your last contact  with the positive individual    Note: If you have ongoing exposure to the covid positive person, this quarantine period may be more than 14 days. (For example, if you are continued to be exposed to your child who tested positive and cannot isolate from them, then the quarantine of 7-14 days can't start until your child is no longer contagious. This is typically 10 days from onset of the child's symptoms. So the total duration may be 17-24 days in this case.)    Sign up for [employer].   We know it's scary to hear that you might have COVID-19. We want to track your symptoms to make sure you're okay over the next 2 weeks. Please look for an email from [employer]--this is a free, online program that we'll use to keep in touch. To sign up, follow the link in the email you will receive. Learn more at [email or web address]    How can I take care of myself?    Get lots of rest. Drink extra fluids (unless a doctor has told you not to)    Take Tylenol (acetaminophen) or ibuprofen for fever or pain. If you have liver or kidney problems, ask your family doctor if it's okay to take Tylenol o ibuprofen    If you have other health problems (like cancer, heart failure, an organ transplant or severe kidney disease): Call your specialty clinic if you don't feel better in the next 2 days.    Know when to call 911. Emergency warning signs include:  o Trouble breathing or shortness of breath  o Pain or pressure in the chest that doesn't go away  o Feeling confused like you haven't felt before, or not being able to wake up  o Bluish-colored lips or face    Where can I get more information?  [facility] - About COVID-19:   [email or web address]    CDC - What to Do If You're Sick:   www.cdc.gov/coronavirus/2019-ncov/about/steps-when-sick.html    November 23, 2021  RE:  Susan Puckett                                                                                                                  [address]  [facility] [address]      To whom it may concern:    I evaluated Susna Puckett on November 23, 2021. Susan Puckett should be excused from work/school.     They should let their workplace manager and staffing office know when their quarantine ends.    We can not give an exact date as it depends on the information below. They can calculate this on their own or work with their manager/staffing office to calculate this. (For example if they were exposed on 10/04, they would have to quarantine for 14 full days. That would be through 10/18. They could return on 10/19.)    Quarantine Guidelines:      If patient receives a positive COVID-19 test result, they should follow the guidance of those who are giving the results. Usually the return to work is 10 (or in some cases 20 days from symptom onset.) If they work at [employer], they must be cleared by Employee Occupational Health and Safety to return to work.        If patient receives a negative COVID-19 test result and did not have a high risk exposure to someone with a known positive COVID-19 test, they can return to work once they're free of fever for 24 hours without fever-reducing medication and their symptoms are improving or resolved.      If patient receives a negative COVID-19 test and if they had a high risk exposure to someone who has tested positive for COVID-19 then they can return to work 14 days after their last contact with the positive individual    Note: If there is ongoing exposure to the covid positive person, this quarantine period may be longer than 14 days. (For example, if they are continually exposed to their child, who tested positive and cannot isolate from them, then the quarantine of 7-14 days can't start until their child is no longer contagious. This is typically 10 days from onset to the child's symptoms. So the total duration may be 17-24 days in this case.)     Sincerely,  Kae Lo

## 2021-11-23 NOTE — TELEPHONE ENCOUNTER
Patient called feels like she needs to get a Covid test done. Patient wanted to go to [facility] which requires an order admin did search for the patient and let her know which pharmacy she can go to with out an order [facility].

## 2021-11-24 NOTE — PROGRESS NOTES
Per chart review, pt was experiencing increased shortness of breath and chest pressure on 11/23 and was concerned that she had COVID. Pt was able to get schedule a thoracentesis appointment on 11/23 and had 1500 ml of fluid removed (first thoracentesis since TIPS on 11/5). Pt also had COVID test done, results pending.     Called pt to check in and review plan of care. Pt stated that she felt awful yesterday and was truly concerned that she had COVID. Pt reported that she feels a lot better today and symptoms have resolved. Pt plans to proactively schedule thoracentesis appointments and will cancel as needed. Pt reported decreased lower extremity edema since increase in lasix. Pt had repeat labs drawn on 11/22 and, per Grete, pt should keep diuretics at current doses. Pt will also need a repeat BMP in 2 weeks. Order placed and faxed to pt's PCP per pt request.

## 2021-11-28 ENCOUNTER — TRANSFERRED RECORDS (OUTPATIENT)
Dept: HEALTH INFORMATION MANAGEMENT | Facility: CLINIC | Age: 51
End: 2021-11-28
Payer: COMMERCIAL

## 2021-11-30 ENCOUNTER — TELEPHONE (OUTPATIENT)
Dept: TRANSPLANT | Facility: CLINIC | Age: 51
End: 2021-11-30
Payer: COMMERCIAL

## 2021-11-30 ENCOUNTER — PATIENT OUTREACH (OUTPATIENT)
Dept: GASTROENTEROLOGY | Facility: CLINIC | Age: 51
End: 2021-11-30
Payer: COMMERCIAL

## 2021-11-30 NOTE — TELEPHONE ENCOUNTER
Patient Call: Voicemail  Date/Time: 11/30/21 12:49 pm  Reason for call:     Beverley would like to connect with patients care team on how to proceed with patient' care due to change in condition.     [phone] - work phone    [phone] - personal phone

## 2021-11-30 NOTE — PROGRESS NOTES
Pt notified writer that she is currently hospitalized for recurrent right pleural effusion. Pt presented to the emergency room on 11/26 with shortness of breath and chest pain and had 2 liters of fluid removed during thoracentesis. Pt was discharged and presented on again on 11/28 with same symptoms. A thoracentesis was performed on 11/29 and an additional 2 liters of fluid was removed. Per inpatient GI note, lasix increased to 80 mg 2 times daily and spironolactone increased to 300 mg daily. GI team also suggested pt schedule twice weekly thoracentesis. US abdomen complete with doppler complete done and reported noted patent TIPS with decreased velocities from previous imaging. Pt plans to discharge tomorrow. Writer will discuss follow up plan with Dr. Cook and reach out to pt once discharged.

## 2021-12-02 ENCOUNTER — TELEPHONE (OUTPATIENT)
Dept: TRANSPLANT | Facility: CLINIC | Age: 51
End: 2021-12-02
Payer: COMMERCIAL

## 2021-12-02 NOTE — TELEPHONE ENCOUNTER
Ret. call, playing phone tag, with KERLINE Pittman at [facility], patient currently inpatient there, decreased mental status today, thoracentesis

## 2021-12-03 NOTE — PROGRESS NOTES
[facility]  Transfer Triage Note    Date of call: 12/03/21  Time of call: 4:29 PM    Current Patient Location: [facility]  Current Level of Care: Med Surg    Vitals: BP:SBP 120s HR:  O2 Sats: mid 90s on room air  Diagnosis: Decompensated cirrhosis with acute onset encephalopathy and multiorgan dysfunction with a MELD of 20  Is COVID-19 a concern? No  Reason for requested transfer: Further diagnostic work up, management, and consultation for specialized care   Isolation Needs: None    Outside Records: Available  Additional records may be faxed to [phone].    Transfer accepted: Yes  Stability of Patient: Patient is at risk for instability, however patient requires urgent transfer and does not meet ICU criteria   Level of Care Needed: Med Surg  Telemetry Needed:  None  Expected Time of Arrival for Transfer: 0-8 hours  Arrival Location:  [facility]    Recommendations for Management and Stabilization: Given    Additional Comments:     Ms. Susan Puckett is a 51 year old woman with a history of decompensated liver cirrhosis (listed for transplant) complicated by hepatohydrothorax, who was admitted 5 days ago at [facility] for recurrence of hepatohydrothorax despite undergoing a TIPS in early November.  She had a thoracentesis, diuretics were increased, and was doing well until yesterday when she developed acute alteration in her mental status.  Lactulose was increased and has been stooling well.  She is quite lethargic and intermittently swallowing meds such as lactulose.  Has been protecting airway but needing sternal rubs periodically to elicit a response.  Has needed intermittent lactulose enemas when unable to swallow.    Broad workup completed including pancultures (except of pleural fluid, none was sent from [facility] 3 days ago and not enough fluid to re-tap.  Also no ascites to sample).  Nothing on cultures yet. US showed patent  TIPS.  CT head with no acute pathology.  CT abdomen with no significant findings although did not dilated colon in the setting of lactulose.  Empiric antibiotics were began (ceftriaxone and flagyl).  Creatinine ryann from normal up to 1.38.  Lactic acid ryann from 5 now to 5.3.  MELD score increased to 20 (previously was 13).  Has also been receiving albumin the last two days.    Her spouse Rivera is her surrogate decision maker.  Mother and siblings also involved.    Discussed case with Dr. Gutierrez of Hepatology.  He recommended placing an NG in order to facilitate giving lactulose and prioritizing her transfer to our facility.  Given our current bed limitations, it may be tomorrow before she can transfer to a med/surg bed, no tele, no isolation.  The providers at [facility] will contact patient placement if concerns arise that she would need a higher level of care.    Susan Hugo MD      ADDENDUM from 12/6:  Did a repeat [id number] handoff call since a few days had passed since the initial transfer request:    Susan was obtunded over the weekend but has now improved.  This is thought to be likely related to aggressive lactulose administration via NG and lactulose enemas.  Yesterday she was actually able to sit up and start talking a bit.  Today she is essentially back to her baseline mental status, is oriented x3 and following commands, with no evidence of focal neurological deficits.      Over the past few days, the providers at [facility] have been having trouble with the balance of volume overload and hypotension with careful titration of diuretics but sometimes needing to give small fluid boluses for hypotension.  She is currently hemodynamically stable.    Over the weekend, she had worsening anemia with Hgb dropping to 7.0 and received 2 units of PRBCs.  Hgb today is 9.2.    Her lactic acid has improved (see above) and the last time it was checked a couple days ago it was 2.2.    WBC 6.6,  platelets 50s.  Na was 154 on the 2nd, now is 145.  K initially 2.4, now is 3.6.  Bicarb today is 18.  Mag within normal.  Phosph 1.3.  BUN was 5 initially, now is 17.  Crt today is 0.81 (this is an improvement, see above).    She is continuing to get cefepime and metronidazole.  No growth on blood cultures.  Urine cultures were not sent.  Still unclear why she decompensated.    Physician seeing her today is Ruddy Whitfield, and he is happy to answer further questions if needed (call patient placement to get reconnected).    She will transfer today to a med/surg bed.    Susan Hugo MD  Internal Medicine Hospitalist

## 2021-12-06 ENCOUNTER — HOSPITAL ENCOUNTER (INPATIENT)
Facility: CLINIC | Age: 51
LOS: 3 days | Discharge: HOME | DRG: 442 | End: 2021-12-09
Attending: INTERNAL MEDICINE | Admitting: INTERNAL MEDICINE
Payer: COMMERCIAL

## 2021-12-06 ENCOUNTER — APPOINTMENT (OUTPATIENT)
Dept: ULTRASOUND IMAGING | Facility: CLINIC | Age: 51
DRG: 442 | End: 2021-12-06
Attending: PHYSICIAN ASSISTANT
Payer: COMMERCIAL

## 2021-12-06 ENCOUNTER — TELEPHONE (OUTPATIENT)
Dept: TRANSPLANT | Facility: CLINIC | Age: 51
End: 2021-12-06
Payer: COMMERCIAL

## 2021-12-06 DIAGNOSIS — K72.90 DECOMPENSATED HEPATIC CIRRHOSIS (H): ICD-10-CM

## 2021-12-06 DIAGNOSIS — K75.81 LIVER CIRRHOSIS SECONDARY TO NASH (H): ICD-10-CM

## 2021-12-06 DIAGNOSIS — K74.60 DECOMPENSATED HEPATIC CIRRHOSIS (H): ICD-10-CM

## 2021-12-06 DIAGNOSIS — K74.60 CIRRHOSIS OF LIVER WITHOUT ASCITES, UNSPECIFIED HEPATIC CIRRHOSIS TYPE (H): ICD-10-CM

## 2021-12-06 DIAGNOSIS — I82.A11 ACUTE DEEP VEIN THROMBOSIS (DVT) OF AXILLARY VEIN OF RIGHT UPPER EXTREMITY (H): Primary | ICD-10-CM

## 2021-12-06 DIAGNOSIS — K74.60 LIVER CIRRHOSIS SECONDARY TO NASH (H): ICD-10-CM

## 2021-12-06 LAB
ALBUMIN SERPL-MCNC: 3.3 G/DL (ref 3.4–5)
ALBUMIN UR-MCNC: 30 MG/DL
ALP SERPL-CCNC: 103 U/L (ref 40–150)
ALT SERPL W P-5'-P-CCNC: 13 U/L (ref 0–50)
ANION GAP SERPL CALCULATED.3IONS-SCNC: 7 MMOL/L (ref 3–14)
APPEARANCE UR: CLEAR
AST SERPL W P-5'-P-CCNC: 20 U/L (ref 0–45)
BACTERIA #/AREA URNS HPF: ABNORMAL /HPF
BASOPHILS # BLD AUTO: 0 10E3/UL (ref 0–0.2)
BASOPHILS NFR BLD AUTO: 0 %
BILIRUB SERPL-MCNC: 2.4 MG/DL (ref 0.2–1.3)
BILIRUB UR QL STRIP: NEGATIVE
BUN SERPL-MCNC: 11 MG/DL (ref 7–30)
CALCIUM SERPL-MCNC: 8.3 MG/DL (ref 8.5–10.1)
CAOX CRY #/AREA URNS HPF: ABNORMAL /HPF
CHLORIDE BLD-SCNC: 108 MMOL/L (ref 94–109)
CO2 SERPL-SCNC: 23 MMOL/L (ref 20–32)
COLOR UR AUTO: YELLOW
CREAT SERPL-MCNC: 0.75 MG/DL (ref 0.52–1.04)
EOSINOPHIL # BLD AUTO: 0.3 10E3/UL (ref 0–0.7)
EOSINOPHIL NFR BLD AUTO: 5 %
ERYTHROCYTE [DISTWIDTH] IN BLOOD BY AUTOMATED COUNT: 17.4 % (ref 10–15)
GFR SERPL CREATININE-BSD FRML MDRD: >90 ML/MIN/1.73M2
GLUCOSE BLD-MCNC: 284 MG/DL (ref 70–99)
GLUCOSE BLDC GLUCOMTR-MCNC: 179 MG/DL (ref 70–99)
GLUCOSE BLDC GLUCOMTR-MCNC: 372 MG/DL (ref 70–99)
GLUCOSE UR STRIP-MCNC: 300 MG/DL
HCT VFR BLD AUTO: 29.6 % (ref 35–47)
HGB BLD-MCNC: 9.7 G/DL (ref 11.7–15.7)
HGB UR QL STRIP: ABNORMAL
IMM GRANULOCYTES # BLD: 0.1 10E3/UL
IMM GRANULOCYTES NFR BLD: 1 %
INR PPP: 2.06 (ref 0.85–1.15)
KETONES UR STRIP-MCNC: NEGATIVE MG/DL
LEUKOCYTE ESTERASE UR QL STRIP: ABNORMAL
LYMPHOCYTES # BLD AUTO: 0.9 10E3/UL (ref 0.8–5.3)
LYMPHOCYTES NFR BLD AUTO: 16 %
MAGNESIUM SERPL-MCNC: 1.9 MG/DL (ref 1.6–2.3)
MCH RBC QN AUTO: 31.6 PG (ref 26.5–33)
MCHC RBC AUTO-ENTMCNC: 32.8 G/DL (ref 31.5–36.5)
MCV RBC AUTO: 96 FL (ref 78–100)
MONOCYTES # BLD AUTO: 0.3 10E3/UL (ref 0–1.3)
MONOCYTES NFR BLD AUTO: 5 %
MUCOUS THREADS #/AREA URNS LPF: PRESENT /LPF
NEUTROPHILS # BLD AUTO: 4 10E3/UL (ref 1.6–8.3)
NEUTROPHILS NFR BLD AUTO: 73 %
NITRATE UR QL: NEGATIVE
NRBC # BLD AUTO: 0 10E3/UL
NRBC BLD AUTO-RTO: 0 /100
PH UR STRIP: 6 [PH] (ref 5–7)
PLATELET # BLD AUTO: 51 10E3/UL (ref 150–450)
POTASSIUM BLD-SCNC: 3.4 MMOL/L (ref 3.4–5.3)
PROT SERPL-MCNC: 6.2 G/DL (ref 6.8–8.8)
RADIOLOGIST FLAGS: ABNORMAL
RBC # BLD AUTO: 3.07 10E6/UL (ref 3.8–5.2)
RBC URINE: 17 /HPF
SODIUM SERPL-SCNC: 138 MMOL/L (ref 133–144)
SP GR UR STRIP: 1.02 (ref 1–1.03)
SQUAMOUS EPITHELIAL: 1 /HPF
T4 FREE SERPL-MCNC: 1.16 NG/DL (ref 0.76–1.46)
TSH SERPL DL<=0.005 MIU/L-ACNC: 7.17 MU/L (ref 0.4–4)
UROBILINOGEN UR STRIP-MCNC: NORMAL MG/DL
WBC # BLD AUTO: 5.6 10E3/UL (ref 4–11)
WBC URINE: 68 /HPF

## 2021-12-06 PROCEDURE — 85610 PROTHROMBIN TIME: CPT | Performed by: PHYSICIAN ASSISTANT

## 2021-12-06 PROCEDURE — 250N000013 HC RX MED GY IP 250 OP 250 PS 637: Performed by: PHYSICIAN ASSISTANT

## 2021-12-06 PROCEDURE — 87086 URINE CULTURE/COLONY COUNT: CPT | Performed by: PHYSICIAN ASSISTANT

## 2021-12-06 PROCEDURE — 93010 ELECTROCARDIOGRAM REPORT: CPT | Performed by: INTERNAL MEDICINE

## 2021-12-06 PROCEDURE — 250N000012 HC RX MED GY IP 250 OP 636 PS 637: Performed by: PHYSICIAN ASSISTANT

## 2021-12-06 PROCEDURE — 93005 ELECTROCARDIOGRAM TRACING: CPT

## 2021-12-06 PROCEDURE — 82962 GLUCOSE BLOOD TEST: CPT

## 2021-12-06 PROCEDURE — 83735 ASSAY OF MAGNESIUM: CPT | Performed by: PHYSICIAN ASSISTANT

## 2021-12-06 PROCEDURE — 99207 PR APP CREDIT; MD BILLING SHARED VISIT: CPT | Performed by: PHYSICIAN ASSISTANT

## 2021-12-06 PROCEDURE — 80053 COMPREHEN METABOLIC PANEL: CPT | Performed by: PHYSICIAN ASSISTANT

## 2021-12-06 PROCEDURE — 84443 ASSAY THYROID STIM HORMONE: CPT | Performed by: PHYSICIAN ASSISTANT

## 2021-12-06 PROCEDURE — 99223 1ST HOSP IP/OBS HIGH 75: CPT | Mod: AI | Performed by: INTERNAL MEDICINE

## 2021-12-06 PROCEDURE — 120N000011 HC R&B TRANSPLANT UMMC

## 2021-12-06 PROCEDURE — 84439 ASSAY OF FREE THYROXINE: CPT | Performed by: PHYSICIAN ASSISTANT

## 2021-12-06 PROCEDURE — 85025 COMPLETE CBC W/AUTO DIFF WBC: CPT | Performed by: PHYSICIAN ASSISTANT

## 2021-12-06 PROCEDURE — 81001 URINALYSIS AUTO W/SCOPE: CPT | Performed by: PHYSICIAN ASSISTANT

## 2021-12-06 PROCEDURE — 93971 EXTREMITY STUDY: CPT | Mod: RT

## 2021-12-06 PROCEDURE — 93971 EXTREMITY STUDY: CPT | Mod: 26 | Performed by: RADIOLOGY

## 2021-12-06 PROCEDURE — 36415 COLL VENOUS BLD VENIPUNCTURE: CPT | Performed by: PHYSICIAN ASSISTANT

## 2021-12-06 RX ORDER — TRAZODONE HYDROCHLORIDE 50 MG/1
50-150 TABLET, FILM COATED ORAL
Status: DISCONTINUED | OUTPATIENT
Start: 2021-12-06 | End: 2021-12-09 | Stop reason: HOSPADM

## 2021-12-06 RX ORDER — PROCHLORPERAZINE MALEATE 5 MG
10 TABLET ORAL EVERY 6 HOURS PRN
Status: DISCONTINUED | OUTPATIENT
Start: 2021-12-06 | End: 2021-12-09 | Stop reason: HOSPADM

## 2021-12-06 RX ORDER — VITAMIN E 268 MG
400 CAPSULE ORAL EVERY MORNING
Status: DISCONTINUED | OUTPATIENT
Start: 2021-12-07 | End: 2021-12-09 | Stop reason: HOSPADM

## 2021-12-06 RX ORDER — MULTIVITAMIN,THERAPEUTIC
2 TABLET ORAL EVERY MORNING
Status: DISCONTINUED | OUTPATIENT
Start: 2021-12-07 | End: 2021-12-09 | Stop reason: HOSPADM

## 2021-12-06 RX ORDER — SUMATRIPTAN 50 MG/1
50 TABLET, FILM COATED ORAL
Status: DISCONTINUED | OUTPATIENT
Start: 2021-12-06 | End: 2021-12-09 | Stop reason: HOSPADM

## 2021-12-06 RX ORDER — ONDANSETRON 2 MG/ML
4 INJECTION INTRAMUSCULAR; INTRAVENOUS EVERY 6 HOURS PRN
Status: DISCONTINUED | OUTPATIENT
Start: 2021-12-06 | End: 2021-12-09 | Stop reason: HOSPADM

## 2021-12-06 RX ORDER — LANOLIN ALCOHOL/MO/W.PET/CERES
1000 CREAM (GRAM) TOPICAL
Status: DISCONTINUED | OUTPATIENT
Start: 2021-12-07 | End: 2021-12-09 | Stop reason: HOSPADM

## 2021-12-06 RX ORDER — LACTULOSE 10 G/15ML
20 SOLUTION ORAL 3 TIMES DAILY
Status: DISCONTINUED | OUTPATIENT
Start: 2021-12-06 | End: 2021-12-09

## 2021-12-06 RX ORDER — LEVOTHYROXINE SODIUM 200 UG/1
200 TABLET ORAL
Status: DISCONTINUED | OUTPATIENT
Start: 2021-12-07 | End: 2021-12-09 | Stop reason: HOSPADM

## 2021-12-06 RX ORDER — POLYETHYLENE GLYCOL 3350 17 G/17G
17 POWDER, FOR SOLUTION ORAL DAILY PRN
Status: DISCONTINUED | OUTPATIENT
Start: 2021-12-06 | End: 2021-12-09 | Stop reason: HOSPADM

## 2021-12-06 RX ORDER — PROCHLORPERAZINE 25 MG
25 SUPPOSITORY, RECTAL RECTAL EVERY 12 HOURS PRN
Status: DISCONTINUED | OUTPATIENT
Start: 2021-12-06 | End: 2021-12-09 | Stop reason: HOSPADM

## 2021-12-06 RX ORDER — FUROSEMIDE 40 MG
40 TABLET ORAL DAILY
Status: DISCONTINUED | OUTPATIENT
Start: 2021-12-06 | End: 2021-12-09 | Stop reason: HOSPADM

## 2021-12-06 RX ORDER — LIDOCAINE 40 MG/G
CREAM TOPICAL
Status: DISCONTINUED | OUTPATIENT
Start: 2021-12-06 | End: 2021-12-09 | Stop reason: HOSPADM

## 2021-12-06 RX ORDER — PANTOPRAZOLE SODIUM 40 MG/1
40 TABLET, DELAYED RELEASE ORAL DAILY
Status: DISCONTINUED | OUTPATIENT
Start: 2021-12-07 | End: 2021-12-09 | Stop reason: HOSPADM

## 2021-12-06 RX ORDER — ATORVASTATIN CALCIUM 20 MG/1
20 TABLET, FILM COATED ORAL EVERY MORNING
Status: DISCONTINUED | OUTPATIENT
Start: 2021-12-07 | End: 2021-12-09 | Stop reason: HOSPADM

## 2021-12-06 RX ORDER — VITAMIN B COMPLEX
50 TABLET ORAL EVERY MORNING
Status: DISCONTINUED | OUTPATIENT
Start: 2021-12-07 | End: 2021-12-09 | Stop reason: HOSPADM

## 2021-12-06 RX ORDER — CHOLECALCIFEROL (VITAMIN D3) 125 MCG
10000 CAPSULE ORAL EVERY MORNING
Status: DISCONTINUED | OUTPATIENT
Start: 2021-12-07 | End: 2021-12-09 | Stop reason: HOSPADM

## 2021-12-06 RX ORDER — VENLAFAXINE HYDROCHLORIDE 75 MG/1
75 CAPSULE, EXTENDED RELEASE ORAL AT BEDTIME
Status: DISCONTINUED | OUTPATIENT
Start: 2021-12-06 | End: 2021-12-09 | Stop reason: HOSPADM

## 2021-12-06 RX ORDER — FERROUS SULFATE 325(65) MG
325 TABLET ORAL EVERY MORNING
Status: DISCONTINUED | OUTPATIENT
Start: 2021-12-07 | End: 2021-12-09 | Stop reason: HOSPADM

## 2021-12-06 RX ORDER — LACTULOSE 10 G/15ML
100 SOLUTION ORAL
Status: DISCONTINUED | OUTPATIENT
Start: 2021-12-06 | End: 2021-12-09 | Stop reason: HOSPADM

## 2021-12-06 RX ORDER — TOPIRAMATE 50 MG/1
50 TABLET, FILM COATED ORAL DAILY
Status: DISCONTINUED | OUTPATIENT
Start: 2021-12-07 | End: 2021-12-09 | Stop reason: HOSPADM

## 2021-12-06 RX ORDER — ALBUTEROL SULFATE 90 UG/1
1-2 AEROSOL, METERED RESPIRATORY (INHALATION) EVERY 6 HOURS PRN
Status: DISCONTINUED | OUTPATIENT
Start: 2021-12-06 | End: 2021-12-09 | Stop reason: HOSPADM

## 2021-12-06 RX ORDER — ONDANSETRON 4 MG/1
4 TABLET, ORALLY DISINTEGRATING ORAL EVERY 6 HOURS PRN
Status: DISCONTINUED | OUTPATIENT
Start: 2021-12-06 | End: 2021-12-09 | Stop reason: HOSPADM

## 2021-12-06 RX ORDER — FOLIC ACID 1 MG/1
1 TABLET ORAL EVERY MORNING
Status: DISCONTINUED | OUTPATIENT
Start: 2021-12-07 | End: 2021-12-09 | Stop reason: HOSPADM

## 2021-12-06 RX ORDER — LACTULOSE 10 G/15ML
20 SOLUTION ORAL
Status: DISCONTINUED | OUTPATIENT
Start: 2021-12-06 | End: 2021-12-09 | Stop reason: HOSPADM

## 2021-12-06 RX ORDER — DEXTROSE MONOHYDRATE 25 G/50ML
25-50 INJECTION, SOLUTION INTRAVENOUS
Status: DISCONTINUED | OUTPATIENT
Start: 2021-12-06 | End: 2021-12-09 | Stop reason: HOSPADM

## 2021-12-06 RX ORDER — CALCIUM CARBONATE 500 MG/1
500 TABLET, CHEWABLE ORAL DAILY PRN
Status: DISCONTINUED | OUTPATIENT
Start: 2021-12-06 | End: 2021-12-09 | Stop reason: HOSPADM

## 2021-12-06 RX ORDER — NICOTINE POLACRILEX 4 MG
15-30 LOZENGE BUCCAL
Status: DISCONTINUED | OUTPATIENT
Start: 2021-12-06 | End: 2021-12-09 | Stop reason: HOSPADM

## 2021-12-06 RX ORDER — SIMETHICONE 80 MG
80 TABLET,CHEWABLE ORAL EVERY 6 HOURS PRN
Status: DISCONTINUED | OUTPATIENT
Start: 2021-12-06 | End: 2021-12-09 | Stop reason: HOSPADM

## 2021-12-06 RX ORDER — HYDROXYZINE HYDROCHLORIDE 25 MG/1
25 TABLET, FILM COATED ORAL 3 TIMES DAILY PRN
Status: DISCONTINUED | OUTPATIENT
Start: 2021-12-06 | End: 2021-12-09 | Stop reason: HOSPADM

## 2021-12-06 RX ADMIN — RIFAXIMIN 550 MG: 550 TABLET ORAL at 21:44

## 2021-12-06 RX ADMIN — Medication 1 MG: at 21:56

## 2021-12-06 RX ADMIN — HYDROXYZINE HYDROCHLORIDE 25 MG: 25 TABLET, FILM COATED ORAL at 21:44

## 2021-12-06 RX ADMIN — VENLAFAXINE HYDROCHLORIDE 75 MG: 75 CAPSULE, EXTENDED RELEASE ORAL at 21:55

## 2021-12-06 RX ADMIN — PANCRELIPASE 3 CAPSULE: 30000; 6000; 19000 CAPSULE, DELAYED RELEASE PELLETS ORAL at 23:49

## 2021-12-06 RX ADMIN — INSULIN GLARGINE 6 UNITS: 100 INJECTION, SOLUTION SUBCUTANEOUS at 23:50

## 2021-12-06 RX ADMIN — Medication 1 TABLET: at 21:55

## 2021-12-06 ASSESSMENT — ACTIVITIES OF DAILY LIVING (ADL)
ADLS_ACUITY_SCORE: 14

## 2021-12-06 NOTE — LETTER
Transition Communication Hand-off for Care Transitions to Next Level of Care Provider    Name: Susan Puckett  : 1970  MRN #: [id number]  Primary Care Provider: Harleen Billy     Primary Clinic: [facility] [address] [facility] [address]     Reason for Hospitalization:  Decompensated hepatic cirrhosis (H) [K72.90, K74.60]  Admit Date/Time: 2021  6:21 PM  Discharge Date: 2021  Payor Source: Payor: MEDICA / Plan: MEDICA CHOICE / Product Type: Indemnity /          Reason for Communication Hand-off Referral: Other Continuity of care    Discharge Plan: Home with outpatient follow up and home care RN services       Concern for non-adherence with plan of care:   No  Discharge Needs Assessment:  Needs      Most Recent Value   Equipment Currently Used at Home none   # of Referrals Placed by [facility], External Care Coordination          Follow-up specialty is recommended: Yes    Follow-up plan:    Future Appointments   Date Time Provider Department Center   2022 11:00 AM Raphael Cook MD [facility]   1/10/2022  8:40 AM [id number]   1/10/2022 10:00 AM  LAB [facility]   1/10/2022 10:30 AM Eddie Storey MD [facility]       Any outstanding tests or procedures:        Referrals     Future Labs/Procedures    Home care nursing referral     Comments:    RN skilled nursing visit. RN to assess vital signs and weight, home safety, and ability to administer medications.  RN to teach medication management.  RN to provide assistance with managing medications, setting of medications, knowing when to administer additional lactulose if stool output is not at goal 3-4 bowel movements per day.    Your provider has ordered home care nursing services. If you have not been contacted within 2 days of your discharge please call the inpatient department phone number at [phone] .  Please call to schedule your appointment      Home Care OT Referral for Hospital Discharge      Comments:    OT to eval and treat    Your provider has ordered home care - occupational therapy. If you have not been contacted within 2 days of your discharge please call the department phone number listed on the top of this document.    Home Care PT Referral for Hospital Discharge     Comments:    PT to eval and treat    Your provider has ordered home care - physical therapy. If you have not been contacted within 2 days of your discharge please call the department phone number listed on the top of this document.            Key Recommendations:  Please see attached AVS.  Team anticipates patient will discharge home today.  Per discussion with patient she has been struggling with medication management and overall health needs so home care RN services arranged for patient.     Michell Ansari RN    AVS/Discharge Summary is the source of truth; this is a helpful guide for improved communication of patient story

## 2021-12-07 ENCOUNTER — COMMITTEE REVIEW (OUTPATIENT)
Dept: TRANSPLANT | Facility: CLINIC | Age: 51
End: 2021-12-07
Payer: COMMERCIAL

## 2021-12-07 ENCOUNTER — APPOINTMENT (OUTPATIENT)
Dept: SPEECH THERAPY | Facility: CLINIC | Age: 51
DRG: 442 | End: 2021-12-07
Attending: PHYSICIAN ASSISTANT
Payer: COMMERCIAL

## 2021-12-07 ENCOUNTER — APPOINTMENT (OUTPATIENT)
Dept: OCCUPATIONAL THERAPY | Facility: CLINIC | Age: 51
DRG: 442 | End: 2021-12-07
Attending: PHYSICIAN ASSISTANT
Payer: COMMERCIAL

## 2021-12-07 LAB
ALBUMIN SERPL-MCNC: 3.2 G/DL (ref 3.4–5)
ALP SERPL-CCNC: 104 U/L (ref 40–150)
ALT SERPL W P-5'-P-CCNC: 12 U/L (ref 0–50)
ANION GAP SERPL CALCULATED.3IONS-SCNC: 11 MMOL/L (ref 3–14)
AST SERPL W P-5'-P-CCNC: 20 U/L (ref 0–45)
ATRIAL RATE - MUSE: 71 BPM
BACTERIA UR CULT: NO GROWTH
BILIRUB DIRECT SERPL-MCNC: 0.6 MG/DL (ref 0–0.2)
BILIRUB SERPL-MCNC: 2.7 MG/DL (ref 0.2–1.3)
BUN SERPL-MCNC: 9 MG/DL (ref 7–30)
CALCIUM SERPL-MCNC: 8.3 MG/DL (ref 8.5–10.1)
CHLORIDE BLD-SCNC: 107 MMOL/L (ref 94–109)
CO2 SERPL-SCNC: 20 MMOL/L (ref 20–32)
CREAT SERPL-MCNC: 0.7 MG/DL (ref 0.52–1.04)
DIASTOLIC BLOOD PRESSURE - MUSE: NORMAL MMHG
ERYTHROCYTE [DISTWIDTH] IN BLOOD BY AUTOMATED COUNT: 17.2 % (ref 10–15)
ERYTHROCYTE [DISTWIDTH] IN BLOOD BY AUTOMATED COUNT: 17.4 % (ref 10–15)
GFR SERPL CREATININE-BSD FRML MDRD: >90 ML/MIN/1.73M2
GLUCOSE BLD-MCNC: 178 MG/DL (ref 70–99)
GLUCOSE BLDC GLUCOMTR-MCNC: 165 MG/DL (ref 70–99)
GLUCOSE BLDC GLUCOMTR-MCNC: 257 MG/DL (ref 70–99)
GLUCOSE BLDC GLUCOMTR-MCNC: 269 MG/DL (ref 70–99)
GLUCOSE BLDC GLUCOMTR-MCNC: 334 MG/DL (ref 70–99)
HCT VFR BLD AUTO: 30.6 % (ref 35–47)
HCT VFR BLD AUTO: 30.7 % (ref 35–47)
HGB BLD-MCNC: 10 G/DL (ref 11.7–15.7)
HGB BLD-MCNC: 10.1 G/DL (ref 11.7–15.7)
INR PPP: 2.26 (ref 0.85–1.15)
INTERPRETATION ECG - MUSE: NORMAL
LDH SERPL L TO P-CCNC: 212 U/L (ref 81–234)
MCH RBC QN AUTO: 31.3 PG (ref 26.5–33)
MCH RBC QN AUTO: 31.9 PG (ref 26.5–33)
MCHC RBC AUTO-ENTMCNC: 32.6 G/DL (ref 31.5–36.5)
MCHC RBC AUTO-ENTMCNC: 33 G/DL (ref 31.5–36.5)
MCV RBC AUTO: 96 FL (ref 78–100)
MCV RBC AUTO: 97 FL (ref 78–100)
P AXIS - MUSE: 10 DEGREES
PLATELET # BLD AUTO: 65 10E3/UL (ref 150–450)
PLATELET # BLD AUTO: 66 10E3/UL (ref 150–450)
POTASSIUM BLD-SCNC: 3.1 MMOL/L (ref 3.4–5.3)
PR INTERVAL - MUSE: 134 MS
PROT SERPL-MCNC: 6.2 G/DL (ref 6.8–8.8)
QRS DURATION - MUSE: 80 MS
QT - MUSE: 412 MS
QTC - MUSE: 447 MS
R AXIS - MUSE: -21 DEGREES
RBC # BLD AUTO: 3.17 10E6/UL (ref 3.8–5.2)
RBC # BLD AUTO: 3.2 10E6/UL (ref 3.8–5.2)
RETICS # AUTO: 0.11 10E6/UL (ref 0.03–0.1)
RETICS/RBC NFR AUTO: 3.3 % (ref 0.5–2)
SODIUM SERPL-SCNC: 138 MMOL/L (ref 133–144)
SYSTOLIC BLOOD PRESSURE - MUSE: NORMAL MMHG
T AXIS - MUSE: -2 DEGREES
VENTRICULAR RATE- MUSE: 71 BPM
WBC # BLD AUTO: 5.7 10E3/UL (ref 4–11)
WBC # BLD AUTO: 6.6 10E3/UL (ref 4–11)

## 2021-12-07 PROCEDURE — 36415 COLL VENOUS BLD VENIPUNCTURE: CPT | Performed by: STUDENT IN AN ORGANIZED HEALTH CARE EDUCATION/TRAINING PROGRAM

## 2021-12-07 PROCEDURE — 99233 SBSQ HOSP IP/OBS HIGH 50: CPT | Performed by: STUDENT IN AN ORGANIZED HEALTH CARE EDUCATION/TRAINING PROGRAM

## 2021-12-07 PROCEDURE — 250N000013 HC RX MED GY IP 250 OP 250 PS 637: Performed by: INTERNAL MEDICINE

## 2021-12-07 PROCEDURE — 85027 COMPLETE CBC AUTOMATED: CPT | Performed by: PHYSICIAN ASSISTANT

## 2021-12-07 PROCEDURE — 97166 OT EVAL MOD COMPLEX 45 MIN: CPT | Mod: GO | Performed by: OCCUPATIONAL THERAPIST

## 2021-12-07 PROCEDURE — 82248 BILIRUBIN DIRECT: CPT | Performed by: NURSE PRACTITIONER

## 2021-12-07 PROCEDURE — 999N000128 HC STATISTIC PERIPHERAL IV START W/O US GUIDANCE

## 2021-12-07 PROCEDURE — 80053 COMPREHEN METABOLIC PANEL: CPT | Performed by: PHYSICIAN ASSISTANT

## 2021-12-07 PROCEDURE — 97530 THERAPEUTIC ACTIVITIES: CPT | Mod: GO | Performed by: OCCUPATIONAL THERAPIST

## 2021-12-07 PROCEDURE — 92526 ORAL FUNCTION THERAPY: CPT | Mod: GN

## 2021-12-07 PROCEDURE — 36415 COLL VENOUS BLD VENIPUNCTURE: CPT | Performed by: PHYSICIAN ASSISTANT

## 2021-12-07 PROCEDURE — 250N000012 HC RX MED GY IP 250 OP 636 PS 637: Performed by: PHYSICIAN ASSISTANT

## 2021-12-07 PROCEDURE — 250N000011 HC RX IP 250 OP 636: Performed by: STUDENT IN AN ORGANIZED HEALTH CARE EDUCATION/TRAINING PROGRAM

## 2021-12-07 PROCEDURE — 250N000011 HC RX IP 250 OP 636: Performed by: PHYSICIAN ASSISTANT

## 2021-12-07 PROCEDURE — 83010 ASSAY OF HAPTOGLOBIN QUANT: CPT | Performed by: STUDENT IN AN ORGANIZED HEALTH CARE EDUCATION/TRAINING PROGRAM

## 2021-12-07 PROCEDURE — 250N000013 HC RX MED GY IP 250 OP 250 PS 637: Performed by: PHYSICIAN ASSISTANT

## 2021-12-07 PROCEDURE — 97535 SELF CARE MNGMENT TRAINING: CPT | Mod: GO | Performed by: OCCUPATIONAL THERAPIST

## 2021-12-07 PROCEDURE — 92610 EVALUATE SWALLOWING FUNCTION: CPT | Mod: GN

## 2021-12-07 PROCEDURE — 250N000013 HC RX MED GY IP 250 OP 250 PS 637: Performed by: NURSE PRACTITIONER

## 2021-12-07 PROCEDURE — 85045 AUTOMATED RETICULOCYTE COUNT: CPT | Performed by: STUDENT IN AN ORGANIZED HEALTH CARE EDUCATION/TRAINING PROGRAM

## 2021-12-07 PROCEDURE — 83615 LACTATE (LD) (LDH) ENZYME: CPT | Performed by: STUDENT IN AN ORGANIZED HEALTH CARE EDUCATION/TRAINING PROGRAM

## 2021-12-07 PROCEDURE — 99223 1ST HOSP IP/OBS HIGH 75: CPT | Performed by: NURSE PRACTITIONER

## 2021-12-07 PROCEDURE — 250N000009 HC RX 250: Performed by: PHYSICIAN ASSISTANT

## 2021-12-07 PROCEDURE — 120N000011 HC R&B TRANSPLANT UMMC

## 2021-12-07 PROCEDURE — 85610 PROTHROMBIN TIME: CPT | Performed by: PHYSICIAN ASSISTANT

## 2021-12-07 RX ORDER — HYDROMORPHONE HCL IN WATER/PF 6 MG/30 ML
0.2 PATIENT CONTROLLED ANALGESIA SYRINGE INTRAVENOUS ONCE
Status: COMPLETED | OUTPATIENT
Start: 2021-12-07 | End: 2021-12-07

## 2021-12-07 RX ORDER — LORAZEPAM 0.5 MG/1
0.5 TABLET ORAL ONCE
Status: DISCONTINUED | OUTPATIENT
Start: 2021-12-07 | End: 2021-12-08

## 2021-12-07 RX ORDER — SPIRONOLACTONE 100 MG/1
100 TABLET, FILM COATED ORAL EVERY MORNING
Status: DISCONTINUED | OUTPATIENT
Start: 2021-12-08 | End: 2021-12-09 | Stop reason: HOSPADM

## 2021-12-07 RX ORDER — LORAZEPAM 0.5 MG/1
0.5 TABLET ORAL EVERY 4 HOURS PRN
Status: DISCONTINUED | OUTPATIENT
Start: 2021-12-07 | End: 2021-12-09 | Stop reason: HOSPADM

## 2021-12-07 RX ORDER — LANOLIN ALCOHOL/MO/W.PET/CERES
3 CREAM (GRAM) TOPICAL
Status: DISCONTINUED | OUTPATIENT
Start: 2021-12-07 | End: 2021-12-09 | Stop reason: HOSPADM

## 2021-12-07 RX ORDER — POTASSIUM CHLORIDE 750 MG/1
40 TABLET, EXTENDED RELEASE ORAL ONCE
Status: COMPLETED | OUTPATIENT
Start: 2021-12-07 | End: 2021-12-07

## 2021-12-07 RX ADMIN — LACTULOSE 20 G: 20 SOLUTION ORAL at 08:32

## 2021-12-07 RX ADMIN — TOPIRAMATE 50 MG: 50 TABLET ORAL at 08:36

## 2021-12-07 RX ADMIN — LACTULOSE 20 G: 20 SOLUTION ORAL at 17:06

## 2021-12-07 RX ADMIN — PHYTONADIONE 0.1 MG: 10 INJECTION, EMULSION INTRAMUSCULAR; INTRAVENOUS; SUBCUTANEOUS at 08:35

## 2021-12-07 RX ADMIN — INSULIN ASPART 1 UNITS: 100 INJECTION, SOLUTION INTRAVENOUS; SUBCUTANEOUS at 08:52

## 2021-12-07 RX ADMIN — VENLAFAXINE HYDROCHLORIDE 75 MG: 75 CAPSULE, EXTENDED RELEASE ORAL at 22:32

## 2021-12-07 RX ADMIN — LORAZEPAM 0.5 MG: 0.5 TABLET ORAL at 09:42

## 2021-12-07 RX ADMIN — INSULIN ASPART 3 UNITS: 100 INJECTION, SOLUTION INTRAVENOUS; SUBCUTANEOUS at 11:30

## 2021-12-07 RX ADMIN — LACTULOSE 20 G: 20 SOLUTION ORAL at 19:43

## 2021-12-07 RX ADMIN — Medication 1 TABLET: at 18:00

## 2021-12-07 RX ADMIN — LACTULOSE 20 G: 20 SOLUTION ORAL at 14:45

## 2021-12-07 RX ADMIN — LACTULOSE 20 G: 20 SOLUTION ORAL at 10:26

## 2021-12-07 RX ADMIN — INSULIN ASPART 4 UNITS: 100 INJECTION, SOLUTION INTRAVENOUS; SUBCUTANEOUS at 17:05

## 2021-12-07 RX ADMIN — RIFAXIMIN 550 MG: 550 TABLET ORAL at 19:43

## 2021-12-07 RX ADMIN — Medication 1 TABLET: at 08:36

## 2021-12-07 RX ADMIN — FERROUS SULFATE TAB 325 MG (65 MG ELEMENTAL FE) 325 MG: 325 (65 FE) TAB at 08:36

## 2021-12-07 RX ADMIN — LACTULOSE 20 G: 20 SOLUTION ORAL at 22:32

## 2021-12-07 RX ADMIN — LACTULOSE 20 G: 20 SOLUTION ORAL at 12:31

## 2021-12-07 RX ADMIN — GLYCERIN, PETROLATUM, PHENYLEPHRINE HCL, PRAMOXINE HCL: 144; 2.5; 10; 15 CREAM TOPICAL at 20:32

## 2021-12-07 RX ADMIN — HYDROMORPHONE HYDROCHLORIDE 0.2 MG: 0.2 INJECTION, SOLUTION INTRAMUSCULAR; INTRAVENOUS; SUBCUTANEOUS at 11:29

## 2021-12-07 RX ADMIN — ATORVASTATIN CALCIUM 20 MG: 20 TABLET, FILM COATED ORAL at 08:35

## 2021-12-07 RX ADMIN — RIFAXIMIN 550 MG: 550 TABLET ORAL at 08:35

## 2021-12-07 RX ADMIN — ENOXAPARIN SODIUM 70 MG: 80 INJECTION SUBCUTANEOUS at 23:15

## 2021-12-07 RX ADMIN — Medication 400 UNITS: at 08:35

## 2021-12-07 RX ADMIN — Medication 1000 MCG: at 08:32

## 2021-12-07 RX ADMIN — PANCRELIPASE 3 CAPSULE: 30000; 6000; 19000 CAPSULE, DELAYED RELEASE PELLETS ORAL at 08:54

## 2021-12-07 RX ADMIN — POTASSIUM CHLORIDE 40 MEQ: 750 TABLET, EXTENDED RELEASE ORAL at 20:32

## 2021-12-07 RX ADMIN — PANCRELIPASE 3 CAPSULE: 30000; 6000; 19000 CAPSULE, DELAYED RELEASE PELLETS ORAL at 11:29

## 2021-12-07 RX ADMIN — THERA TABS 2 TABLET: TAB at 08:35

## 2021-12-07 RX ADMIN — Medication 10000 UNITS: at 08:36

## 2021-12-07 RX ADMIN — ENOXAPARIN SODIUM 70 MG: 80 INJECTION SUBCUTANEOUS at 11:29

## 2021-12-07 RX ADMIN — FOLIC ACID 1 MG: 1 TABLET ORAL at 08:35

## 2021-12-07 RX ADMIN — ENOXAPARIN SODIUM 70 MG: 80 INJECTION SUBCUTANEOUS at 00:13

## 2021-12-07 RX ADMIN — PANTOPRAZOLE SODIUM 40 MG: 40 TABLET, DELAYED RELEASE ORAL at 08:35

## 2021-12-07 RX ADMIN — PANCRELIPASE 3 CAPSULE: 30000; 6000; 19000 CAPSULE, DELAYED RELEASE PELLETS ORAL at 18:00

## 2021-12-07 RX ADMIN — LEVOTHYROXINE SODIUM 200 MCG: 0.2 TABLET ORAL at 08:32

## 2021-12-07 RX ADMIN — Medication 50 MCG: at 08:35

## 2021-12-07 ASSESSMENT — ACTIVITIES OF DAILY LIVING (ADL)
ADLS_ACUITY_SCORE: 21
ADLS_ACUITY_SCORE: 19
ADLS_ACUITY_SCORE: 21
ADLS_ACUITY_SCORE: 17
PREVIOUS_RESPONSIBILITIES: MEAL PREP;HOUSEKEEPING;SHOPPING;MEDICATION MANAGEMENT;FINANCES;DRIVING
ADLS_ACUITY_SCORE: 21

## 2021-12-07 NOTE — PROGRESS NOTES
"   12/07/21 1118   Quick Adds   Type of Visit Initial Occupational Therapy Evaluation   Living Environment   People in home spouse   Current Living Arrangements house   Home Accessibility stairs to enter home;stairs within home   Number of Stairs, Main Entrance 3   Stair Railings, Main Entrance railings safe and in good condition   Transportation Anticipated family or friend will provide   Self-Care   Usual Activity Tolerance moderate   Current Activity Tolerance fair   Equipment Currently Used at Home none   Activity/Exercise/Self-Care Comment Pt reports walking her dog in the backyard. Pt reports increasing weakness over past year. Pt only showers when spouse is at home, otherwise indep with ADLs.   Instrumental Activities of Daily Living (IADL)   Previous Responsibilities meal prep;housekeeping;shopping;medication management;finances;driving   IADL Comments Per chart and pt: pt reports her  does the laundry and most of the cooking. Pt rarely drives. Shares remainder of IADL responsibilities with . Pt generally manages own meds, though spouse A if needed   Disability/Function   Change in Functional Status Since Onset of Current Illness/Injury yes   General Information   Onset of Illness/Injury or Date of Surgery 12/06/21   Referring Physician Lia Pike   Patient/Family Therapy Goal Statement (OT) \"get stronger\"   Additional Occupational Profile Info/Pertinent History of Current Problem per chart: 51 year old female admitted on 12/6/2021. 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, DMII, normocytic/macrocytic anemia, thrombocytopenia, depression, hypothyroidism with recent hospitalization 9/18-9/23/21 [facility] recurrent pleural effusion/hydrothorax complicated by bacteremia with strep gallalyticus thought liver as source with 2 weeks of ceftriaxone outpatient, who presented to [facility] 11/28 with gradual " worsening SOB, dry cough, chest pressure consistent with recurrent pleural effusion, underwent thoracentesis 11/29, became more lethargic 12/2 concerning for hepatic encephalopathy, started on cefepime and flagyl empirically and increased lactulose and transfer to [facility] was requested, being admitted 12/6 to [facility] for ongoing management of hepatology consultation.    Existing Precautions/Restrictions fall   Left Upper Extremity (Weight-bearing Status) full weight-bearing (FWB)  (all; painful PICC RUE)   Cognitive Status Examination   Orientation Status person;place   Affect/Mental Status (Cognitive) WFL;flat/blunted affect   Follows Commands follows one-step commands;75-90% accuracy   Attention Deficit minimal deficit   Cognitive Status Comments pt oriented to month/year, off on date by 1; pt processing commands/questions slowly, often needing repetition; pt fell asleep immediately upon return to bed   Pain Assessment   Patient Currently in Pain Yes, see Vital Sign flowsheet  (RUE PICC)   Integumentary/Edema   Integumentary/Edema Comments donny blood at PICC insertion (clear dressing over)   Posture   Posture protracted shoulders   Range of Motion Comprehensive   Comment, General Range of Motion LUE/BLE WFL; RUE NT   Strength Comprehensive (MMT)   Comment, General Manual Muscle Testing (MMT) Assessment LUE/BLE generally 4/5   Coordination   Coordination Comments pt moving very slowly, trouble with opposition to digits, hand weakness noted   Bed Mobility   Bed Mobility sit-supine   Sit-Supine [place] (Bed Mobility) minimum assist (75% patient effort);verbal cues;supervision;set up   Assistive Device (Bed Mobility) bed rails   Transfers   Transfers sit-stand transfer;toilet transfer;shower transfer   Sit-Stand Transfer   Sit-Stand [place]ady (Transfers) contact guard;verbal cues;supervision   Shower Transfer   Shower Transfer Comments tub/shower, no shower seat   Toilet Transfer   Type (Toilet Transfer)  "sit-stand;stand-sit   [place] Level (Toilet Transfer) contact guard;verbal cues;supervision   Assistive Device (Toilet Transfer) grab bars/safety frame   Balance   Balance Comments CGA for room ambulation; pt moving very cautiously/slowly, states she feels \"unconfident\"    Activities of Daily Living   BADL Assessment lower body dressing;toileting   Lower Body Dressing Assessment   [place] Level (Lower Body Dressing) moderate assist (50% patient effort)   Toileting   [place] Level (Toileting) minimum assist (75% patient effort)   Clinical Impression   Criteria for Skilled Therapeutic Interventions Met (OT) yes;meets criteria;skilled treatment is necessary   OT Diagnosis impaired ADLs   OT Problem List-Impairments impacting ADL problems related to;activity tolerance impaired;balance;cognition;coordination;strength;pain   Assessment of Occupational Performance 3-5 Performance Deficits   Identified Performance Deficits dressing, toileting, shower, home chores   Planned Therapy Interventions (OT) ADL retraining;transfer training;strengthening;cognition   Clinical Decision Making Complexity (OT) moderate complexity   Therapy Frequency (OT) 6x/week   Predicted Duration of Therapy 1 week   Anticipated Equipment Needs Upon Discharge (OT) shower chair   Risk & Benefits of therapy have been explained evaluation/treatment results reviewed;care plan/treatment goals reviewed;participants included;patient   OT Discharge Planning    OT Discharge Recommendation (DC Rec) Home with assist;home with home care occupational therapy   OT Rationale for DC Rec Pt moving very slowly, limited by fatigue, weakness, cognition. Anticipate she will be able to discharge to home with resumed assist from spouse and resumed HHOT, once medically stable, pending progress with alertness/mobility/ADLs.    OT Brief overview of current status  ModA LE dressing, Meme toileting, CGA ambulation in room, pt limited by fatigue/pain   Total " Evaluation Time (Minutes)   Total Evaluation Time (Minutes) 5

## 2021-12-07 NOTE — PROGRESS NOTES
"CLINICAL NUTRITION SERVICES - ASSESSMENT NOTE     Nutrition Prescription    RECOMMENDATIONS FOR MDs/PROVIDERS TO ORDER:  Recommend checking Vitamin A and E levels to determine ongoing appropriateness of current supplementation.     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 Patient was busy with other team members x 2 attempts.   Patient was seen by OP RD on 10/19/20 at that time patient reported following a Mediterranean diet. Patient reported nausea as a barrier to eating at that time and meat was not appealing to her.    Per chart review this admission patient reported to speech therapy of ongoing poor appetite.     Speech therapy recommend regular diet with thin liquids    CURRENT NUTRITION ORDERS  Diet: Moderate Consistent Carbohydrate   Intake/Tolerance: 75% of meal tray x 1     LABS  Labs reviewed  (12/7): K+ 3.1 mmol/L (L)    MEDICATIONS  Medications reviewed  Creon 6 3 capsules with meals   Lipitor  Citracal  Vitamin B12  Ferosul  Folvite  Lactulose  thera-vit  Phytonadione  Vitamin A  Vitamin E  Vitamin D3    ANTHROPOMETRICS  Height: 170.2 cm (5' 7\")  Most Recent Weight: 74.4 kg  (11/16/21)   IBW: 61.4 kg  BMI: Overweight BMI 25-29.9  Weight History:   Wt Readings from Last 10 Encounters: "   11/16/21 74.4 kg (164 lb)   11/06/21 74.5 kg (164 lb 4.8 oz)   11/05/21 73.5 kg (162 lb 0.6 oz)   10/05/21 78 kg (171 lb 14.4 oz)   07/07/21 71.9 kg (158 lb 9.6 oz)   05/18/21 71.3 kg (157 lb 1.6 oz)   04/07/21 67.7 kg (149 lb 4.8 oz)   02/25/21 62.3 kg (137 lb 4.8 oz)   01/26/21 62.1 kg (137 lb)   12/06/20 57.8 kg (127 lb 6.8 oz)   + recent weight gain   Dosing Weight: 65 kg (adjBW based off IBW and most recent weight)    ASSESSED NUTRITION NEEDS  Estimated Energy Needs: 0659-5516 kcals/day (25 - 30 kcals/kg)  Justification: Maintenance  Estimated Protein Needs: 65-78 grams protein/day (1 - 1.2 grams of pro/kg)  Justification: Maintenance  Estimated Fluid Needs: (1 mL/kcal)   Justification: Maintenance    PHYSICAL FINDINGS  See malnutrition section below.    MALNUTRITION  % Intake: Unable to assess  % Weight Loss: Unable to assess  - no recent weight this admission   Subcutaneous Fat Loss: Unable to assess  Muscle Loss: Unable to assess  Fluid Accumulation/Edema: None noted  Malnutrition Diagnosis: Unable to determine due to incomplete NFPA     NUTRITION DIAGNOSIS  Predicted inadequate nutrient intake energy/protein related to recent decreased appetite     INTERVENTIONS  Implementation  Nutrition Education: Unable to complete due to patient not available    Medical food supplement therapy     Goals  Patient to consume % of nutritionally adequate meal trays TID, or the equivalent with supplements/snacks.     Monitoring/Evaluation  Progress toward goals will be monitored and evaluated per protocol.    Marie Espinoza, MS/RD/ANDRES/CNSC  7A RD Pager: [phone]

## 2021-12-07 NOTE — CONSULTS
Patient Name: Susan Puckett  : 1970  Age: 51 year old  MRN: [id number]  Date of Initial Social Work Evaluation: 10/19/2020    Patient on transplant wait list. I met with Susan and Roly at bedside to update psychosocial assessment.        Presenting Information   Living Situation: Susan lives in a single family home with her spouse, Roly.  If not local, plans for short term stay:  Susan lives local  Functional Status: Patient is standby assist.   Cultural/Language/Spiritual Considerations: Susan identifies as [place] and her primary language is English     Support System  Primary Support Person Roly   Other support:  Mother - Kristy  Plan for support in immediate post-transplant period: Roly    Health Care Directive  Decision Maker: Pt when able  Alternate Decision Maker: Roly  Health Care Directive: Patient considering completing    Mental Health/Coping:   History of Mental Health: Susan has a history of depression and anxiety. This is managed by medications prescribed by her primary care and therapy through [facility]. She reports that she has not seen her therapist due to her recent hospitalizations and health.   History of Chemical Health: No past/current chemical health concerns.   Current status: When I met with Susan and Roly, Susan was dysregulated and tearful during much of the conversation.  Coping: recommendation for additional support   Services Needed/Recommended: I recommended that Susan establishes care with her 1:1 therapist.     Financial   Income: Susan receives long term disability and social security disability.   Impact of transplant on income: Susan and Roly voice financial struggles related to their medical bills. Roly has been unable to work as well due to Susan's health condition.   Insurance and medication coverage: [employer]  Financial concerns: This writer assisted with patients mortgage upon listing for transplant. This was a one time manuel assistance and was communicated as such  at the time it was awarded. We have exhausted financial assistance through the transplant manuel assistance. Susan and Roly voice that they have been withdrawing from their 403b in order to pay for medical bills. They report they are running out of options. I inquired if they have a  outside of a health system and they reported they did not. I provided some resources that they could see If they qualify (e.g., EBT or energy assistance), however after review, their financial concerns may be outside of 's scope. I also suggested to inquire if other insurance plans would be available for pt with a lower out of pocket max.   Resources needed: I sent resources POW to patient's email.     Education provided by SW: Social Work role in transplant program, availability of support groups, parking information and liver support group.    Assessment and recommendations and plan:  This writer met with patient and spouse at bedside. Patient presents as dysregulated and emotional when speaking about her transplant journey. Pt and spouse spent most of their time talking about their finances (see above). This writer will be avaliable ongoing for psychosocial support.     RENARD Coronado, [facility]  Liver Transplant   [facility]  Phone: [phone]  Pager: [phone]

## 2021-12-07 NOTE — PROGRESS NOTES
Admitted from: [facility] transport  Report received from: Yarsani RN Venus  Was Pulsate ordered?: no    Unable to complete full assessment, patient received anxiety medication prior to transport and fell asleep upon arrival.

## 2021-12-07 NOTE — PLAN OF CARE
/76 (BP Location: Left arm)   Pulse 78   Temp 98.7  F (37.1  C) (Oral)   Resp 20   SpO2 97%      Vitally stable, on room air. Pt. Tearful and anxious throughout the night. Anxious about upcoming procedures and getting an new IV placed.  Wants to make sure she gets strong pain meds prior to having her PICC pulled and new IV placed. Rossi with 600 ml output on 11p-7a shift No BM this shift. Denies pain and nausea. Rt. Arm DVT. PICC needs to be removed by day team. Confused and at times lethargic. Moderate carb diet. Good appetite. ACHS BG.

## 2021-12-07 NOTE — PROVIDER NOTIFICATION
"Pt is requesting to eat regular food. refuses insulin until she eats \"real food.\" on clear liquid diet at this time. Thanks, Venessa MOSS  "

## 2021-12-07 NOTE — PROGRESS NOTES
[facility]    Medicine Progress Note - Hospitalist Service, Gold 11       Date of Admission:  12/6/2021    Assessment & Plan      Susan Puckett is a 51 year old female admitted on 12/6/2021. 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, DMII, normocytic/macrocytic anemia, thrombocytopenia, depression, hypothyroidism with recent hospitalization 9/18-9/23/21 wt recurrent pleural effusion/hydrothorax complicated by bacteremia with strep gallalyticus thought liver as source with 2 weeks of ceftriaxone outpatient, who presented to [facility] 11/28 with gradual worsening SOB, dry cough, chest pressure consistent with recurrent pleural effusion, underwent thoracentesis 11/29, became more lethargic 12/2 concerning for hepatic encephalopathy, started on cefepime and flagyl empirically and increased lactulose and transfer to [facility] was requested, being admitted 12/6 to [facility] for ongoing management of hepatology consultation.        Hepatic encephalopathy, improving   Decompensated CUETO cirrhosis s/p TIPS 11/5/2021  Hepatohydrothorax, recurrent  Complicated by esophageal varices (EGD 4/2021 grade 1 varices) and recurrent hepatic hydrothorax s/p TIPS 11/5/21. Underwent thoracentesis 11/29 at [facility] with 2L removed. Became more lethargic 12/2 concerning for hepatic encephalopathy with unknown cause. Workup at [facility] initiated with blood cultures. Pleural fluid not cultured when thoracentesis completed and but there was not enough ascitic fluid for diagnostic paracentesis.  Abdominal ultrasound on 11/29 at [facility] illustrated hepatic steatosis and patent TIPS with velocities up to 80 cm/sec. Started on empiric antibiotics with cefepime (12/03 - 12/06) and flagyl (12/03 - 12/06) but discontinued here on arrival.  Mental status improving with treatment of hepatic encephalopathy.  - Continue  PTA rifaximin and lactulose with was taken protocol  - Restart spironolactone and Lasix 12/8  - Continue PTA Creon   - Continue omeprazole  - MELD labs daily   - Hepatology consult appreciated  - PT and OT consults   - She will likely continue to need serial thoracentesis once to twice per week for recurrent hydrothorax  -IR consultation for consideration of TIPS revision.  -Removed PICC line and Rossi today.    Elevated indirect bilirubin  -Add on LDH and haptoglobin given fractionation of bilirubin shows mostly indirect component.  We will also send for reticulocyte count.  -We will consider peripheral smear but did recently receive blood transfusion.  -Daily CMP.    Nonocclusive DVT of right upper extremity, likely PICC associated  PICC line removed today in sterile fashion.  Has some persistent right upper extremity pain, will continue to monitor symptoms.  -Continue therapeutic Lovenox.    Hypokalemia - 2/2 diarrhea. Replace.    DMII with long term use of insulin   PTA on lantus 12 units HS, Januvia 100 mg daily. A1c 5.9 on 10/20/21.   -Increase Lantus back to prior to admission dose of 12 units nightly  - Med sliding scale insulin   - Hypoglycemic protocol   - Consistent carb diet      Iron deficiency anemia   Pancytopenia   Hgb dropped to 7.1on 12.4, unclear etiology, transfused 2 units pRBC at [facility] with recovery of hgb to 9.4.  Hemoglobin remained stable.  - Continue PTA ferrous fluconate 324 mg daily   -Daily CBC     Malnutrition   - Nutrition consult   - Continue calcium/vitamin D   - Continue multivitamin   - Vitamin E daily   - Vitamin A 6 mg daily   - Vitamin B12 1000 mcg weekly   - Stop prior to admission vitamin K supplementation  - Vitamin D    - Folic acid      GEORGETTE, resolved   Had increase in creatinine at [facility]. Received IV albumin 100 mg daily X 3 days, completed 12/4/21. Cr returned to baseline.   - CMP daily      Dysphagia   Ongoing chronic dysphagia. Scheduled for EGD in 1/2022.     -Speech consult appreciated, cleared for regular diet with thin textures.  Should sit upright when eating.  - Continue PPI      Hypothyroidism   TSH elevated at 54 on 11/1/21. Free T3 low and Free T4 wnl at [facility].   - PTA on synthroid 200 mcg daily, continue      Migraines   - PTA on sumatriptan 100 mg PRN      Depression   Anxiety   - PTA on venlafaxine 150 mg HS    - Hydroxyzine TID PRN      Dyslipidemia   - Continue PTA atorvastatin 20 mg daily              Diet: Moderate Consistent Carb (60 g CHO per Meal) Diet    DVT Prophylaxis: Enoxaparin (Lovenox) SQ, therapeutic  Rossi Catheter: Not present  Central Lines: None  Code Status: Full Code      Disposition Plan   Expected Discharge: 12/09/2021     Anticipated discharge location:  Awaiting care coordination huddle         The patient's care was discussed with the Bedside Nurse, Patient and Hepatology Consultant.    Joe Lin MD  Hospitalist Service, [address]  Securely message with the Vocera Web Console (learn more here)  Text page via [employer] Paging/Directory    Please see sign in/sign out for up to date coverage information      ______________________________________________________________________    Interval History   Patient was very tearful and frightened of having her PICC line and Rossi removed today.  She continues to endorse some right upper extremity pain related to the swelling in her right arm from the blood clot.  Her appetite is fair.  We did get the patient some anxiolysis and pain medication prior to removal of the PICC line and Rossi and she tolerated that well.    Data reviewed today: I reviewed all medications, new labs and imaging results over the last 24 hours. I personally reviewed no images or EKG's today.    Physical Exam   Vital Signs: Temp: 98.5  F (36.9  C) Temp src: Oral BP: 115/78 Pulse: 85   Resp: 20 SpO2: 99 % O2 Device: None (Room air)    Weight: 160 lbs 4.8  oz  GENERAL: Alert and oriented x 3. NAD. Labile mood.   HEENT: Anicteric sclera.   CV: RRR. S1, S2. No murmurs appreciated.   RESPIRATORY: Effort normal on RA. Lungs CTAB with no wheezing, rales, rhonchi.   GI: Abdomen soft and non distended, bowel sounds present. No tenderness, rebound, guarding.   MUSCULOSKELETAL: No joint swelling or tenderness.   : Rossi catheter in place.   NEUROLOGICAL: Speech normal.  Moves all extremities symmetrically and equally.  Alert and oriented to person, place, time, situation.  EXTREMITIES: No peripheral edema. Intact bilateral pedal pulses.   SKIN: No jaundice. No rashes.   Right arm swollen.  Right arm PICC line in place.    Data   Recent Labs   Lab 12/07/21  1129 12/07/21  0815 12/07/21  0544 12/06/21  2148 12/06/21 2024   WBC  --   --  6.6  --  5.6   HGB  --   --  10.0*  --  9.7*   MCV  --   --  96  --  96   PLT  --   --  65*  --  51*   INR  --   --  2.26*  --  2.06*   NA  --   --  138  --  138   POTASSIUM  --   --  3.1*  --  3.4   CHLORIDE  --   --  107  --  108   CO2  --   --  20  --  23   BUN  --   --  9  --  11   CR  --   --  0.70  --  0.75   ANIONGAP  --   --  11  --  7   MAURI  --   --  8.3*  --  8.3*   * 165* 178*   < > 284*   ALBUMIN  --   --  3.2*  --  3.3*   PROTTOTAL  --   --  6.2*  --  6.2*   BILITOTAL  --   --  2.7*  --  2.4*   ALKPHOS  --   --  104  --  103   ALT  --   --  12  --  13   AST  --   --  20  --  20    < > = values in this interval not displayed.

## 2021-12-07 NOTE — COMMITTEE REVIEW
Abdominal Committee Review Note     Evaluation Date: 10/18/2020  Committee Review Date: 12/7/2021    Organ being evaluated for: Liver    Transplant Phase: Waitlist  Transplant Status: Active    Transplant Coordinator: Julia Munoz  Transplant Surgeon:   Mil Tejada    Referring Physician: Rivera Dowling    Primary Diagnosis: Cirrhosis: Fatty Liver (Du)  Secondary Diagnosis:     Committee Review Members:  Nutrition Kelin Mcginnis, RD   Pharmacist Zach Conde, [facility]    - Clinical RENARD Saldana, Juanita Wells, [facility]   Transplant Hazel Jean, AJAY, Ariana Liao LPN, Julia Munoz, RN, Brandi Nichols, RN, Che Burgess MD, Shelby Vicente, APRN CNP, Yoandy Sadler MD, Oliver Tran RN   Transplant Hepatology  Raphael Cook MD, Lacy Burnette MD, Raymundo Gutierrez MD, Yoandy Gonzalez MD, Thomas M. Leventhal, MD   Transplant Surgery JUNO SULLIVAN, Edwina Logan NP, Foreign Benitez MD, Alexei Alcaraz MD, Mil Tejada MD, Himanshu Farias MD       Transplant Eligibility: Cirrhosis with MELD, DU    Committee Review Decision: Remain Active    Relative Contraindications: None    Absolute Contraindications: None    Committee Chair Raymundo Gutierrez MD verbally attested to the committee's decision.    Committee Discussion Details:      Inpatient for AMS s/p TIPS, will remain active on transplant list

## 2021-12-07 NOTE — CONSULTS
Hepatology Consultation    Susan Puckett   MRN# [id number]     Age: 51 year old YOB: 1970       Referring provider: Joe Torres  Attending Hepatologist: Dr. Gutierrez   Consult requested for: cirrhosis, HE       Assessment and Recommendation:   Assessment:   Ms. Puckett is a 51-year-old with a history of CUETO cirrhosis, complicated by RYGB, hepatic hydrothorax s/p TIPS (11/5/21), HE, gr I EV (4/1/21), DM II, depression, hypothyroidism, pancreatic insufficiency on Creon, heterozygous H63D who was recently admitted with dyspnea and pleural effusion requiring thoracentesis and GEORGETTE/HE during diuresis. She is currently listed for liver transplant with MELD 20      Recommendations:   1. CUETO cirrhosis  - MELD 20, remains listed for liver transplant  - US doppler 11/29 without ascites or HCC. Patent TIPS  - no current ascites.     2. Hepatic encephalopathy  3. S/p TIPS for hepatic hydrothorax  - recent episode likely multifactorial with recent TIPS, dehydration due to diuretics and lactulose.   - Has had improvement in mentation with correction of electrolytes. Has not had BM since transfer and receiving  protocol.   - No clear asterixis but does have word finding difficulty. Continue with lactulose and rifaximin 550 mg BID for HE.   - Consult for IR to evaluate if TIPS revision with narrowing is warranted. Gradient at time of TIPS was 7.   - no current evidence of dyspnea and no pleural effusion on CXR 12/3. Continue to monitor, if thora performed, please send diagnostic sample.  - diuretics have been on hold since transfer, may consider restarting tomorrow.     4. Anemia  - has had an overall drop in hemoglobin since TIPS. Received x2 units PRBC. Bili is predominately indirect. Consider further hemolysis work up with reticulocyte count, haptoglobin, LDH. This may be multifactorial with low level hemolysis with shearing in TIPS or bone marrow suppression.     5. Malnutrition  6. RYGB  7. Pancreatic  insufficiency  - consider dietitian to see given hx of panc insufficiency and RYGB. She has not liked protein supplements as outpatient.   - fecal elastase 2019 <15.     Plan of care discussed with Dr. Gutierrez    Thank you for the opportunity to be involved in Susan Puckett care. Please call with any questions or concerns.     CAIN Mccormick, CNP  Inpatient Hepatology [facility]  Text link               History of Present Illness:   Susan Puckett is a 51 year old female with a history of CUETO cirrhosis. Her liver disease has been complicated by RYGB, hepatic hydrothorax s/p TIPS 11/5/21, DM II, depression, hypothyroidism, pancreatic insufficiency on Creon, gr I EV (4/2021), heterozygous H63D HFE, HE who was initally admitted to outside hosp with complaints of dyspnea on 11/26 and noted to have large recurrent pleural effusion with 2 L thora performed. She was discharged home and readmitted with dyspnea on 11/28 and underwent repeat thora removing 2L. No cell count was performed and culture from 11/27 was negative. Her diuretics were increased to 80 mg am and 40 mg pm in addition to 250 mg spironolactone. She developed worsening HE and GEORGETTE (cr 1.38) along with electrolyte changes with sodium to 154 (12/3) requiring placement of NJ tube for lactulose. She received x3 days of albumin 100 gm (12/1-12/4). Infectious work up was negative with BC but was also on empiric abx with cefepime and flagyl from 12/3-12/6. Repeat CXR on 12/3 did not have further evidence of pleural effusion.     She did have improvement in her HE symptoms with increased dose of lactulose as well as improvement in her fluid volume status. She denies having confusion prior to her TIPS but reports being on lactulose. She denies any melena or hematochezia, abdominal distention, nausea, vomiting. She does have decrease in appetitie and has not been taking po supplements due to not liking any in the past. She notes a decrease in activity. Prior to her TIPS,  she was needing a thora at least weekly. She denies any current dyspnea.     She is currently listed for liver transplant with a MELD 20. She states she has not had someone fit for candidacy for living donor.           Past Medical History:     Past Medical History:   Diagnosis Date     Anemia      Anxiety      Cold sore      Depressive disorder      Diabetes (H)     Type 2 DM/No Insulin      Encephalopathy      Esophageal varices without bleeding (H)     grade I     History of blood transfusion     10/2019     History of seizure     diabetic seizure     Insomnia      Liver cirrhosis secondary to CUETO (H) 05/28/2020     Migraine      Pleural effusion      Thrombocytopenia (H)      Thyroid disease               Past Surgical History:     Past Surgical History:   Procedure Laterality Date     APPENDECTOMY      1989     COLONOSCOPY      1/2020 at [facility]      ENT SURGERY  1998    tempanoplasty     GI SURGERY      EGD August 2020 at [facility]      GYN SURGERY  2010    laparoscopic ablation     IR TRANSVEN INTRAHEPATIC PORTOSYST SHUNT  11/5/2021     MAMMOPLASTY REDUCTION BILATERAL  2004     [place] STAB PHELBECTOMY VARICOSE VEINS, LESS THAN 10 INCISIONS, ONE EXTREMITY  2020     RNY gastric bypass  01/2006    retoocolic, retrogastric, [facility]     TONSILLECTOMY & ADENOIDECTOMY Bilateral 1978     WISDOM TEETH EXTRACTION                Social History:     Social History     Tobacco Use     Smoking status: Never Smoker     Smokeless tobacco: Never Used   Substance Use Topics     Alcohol use: Not Currently     Comment: Last drink was in 2017             Family History:   The I have reviewed this patient's family history and updated it with pertinent information if needed.  Family History   Problem Relation Age of Onset     Anesthesia Reaction Nephew         PONV     Bleeding Disorder No family hx of      Clotting Disorder No family hx of                   Immunizations:     Immunization History   Administered Date(s)  Administered     COVID-19,PF,Pfizer (12+ Yrs) 03/11/2021, 04/03/2021, 09/07/2021     FLU 6-35 months 10/15/2019     HepB-Adult 10/24/2006, 12/04/2006, 04/27/2007     Influenza Vaccine IM > 6 months Valent IIV4 (Alfuria,Fluzone) 12/13/2018, 10/15/2019, 10/05/2020, 09/28/2021     Pneumococcal 23 valent 11/22/2019     TD (ADULT, 7+) 09/20/2002     TDAP Vaccine (Boostrix) 10/03/2012     Twinrix A/B 10/31/2019, 10/31/2019, 08/11/2020, 08/11/2020, 02/04/2021            Allergies:     Allergies   Allergen Reactions     Methylprednisolone Hives     Droperidol Anxiety     Nsaids Other (See Comments)     GI bleed.     Prednisone Anxiety, Hives and Rash             Medications:   @  Medications Prior to Admission   Medication Sig Dispense Refill Last Dose     acetaminophen (TYLENOL) 500 MG tablet Take 1 tablet (500 mg) by mouth every 6 hours as needed for mild pain        acetone urine (KETOSTIX) test strip 1 strip        albuterol (PROAIR HFA/PROVENTIL HFA/VENTOLIN HFA) 108 (90 Base) MCG/ACT inhaler Inhale 1-2 puffs into the lungs (Patient not taking: Reported on 11/16/2021)        amylase-lipase-protease (CREON 6) [id number] units CPEP Take 3 capsules by mouth 3 times daily (with meals)        atorvastatin (LIPITOR) 20 MG tablet Take 20 mg by mouth every morning         calcium carbonate (TUMS) 500 MG chewable tablet Take 1 tablet by mouth daily as needed for heartburn         calcium citrate-vitamin D (CITRACAL W/D) 250-100 MG-UNIT tablet Take 2 tablets by mouth 2 times daily (with meals) (Patient taking differently: Take 1 tablet by mouth 2 times daily (with meals) ) 336 tablet 3      Continuous Blood Gluc  (DEXCOM G6 ) LUNA Use as directed for continuous glucose monitoring.        Continuous Blood Gluc Sensor (DEXCOM G6 SENSOR) MISC Use as directed for continuous glucose monitoring.  Change sensor every 10 days.        Continuous Blood Gluc Transmit (DEXCOM G6 TRANSMITTER) MISC Use as directed for  continuous glucose monitoring.  Change every 3 months.        cyanocobalamin (VITAMIN B-12) 1000 MCG tablet Take 1,000 mcg by mouth every 7 days         Ferrous Sulfate (IRON) 325 (65 FE) MG tablet Take 1 tablet by mouth every morning         folic acid (FOLVITE) 1 MG tablet Take 1 mg by mouth every morning         furosemide (LASIX) 40 MG tablet Take 2 tablets (80 mg) by mouth every morning AND 1 tablet (40 mg) every evening. 90 tablet 3      hydrOXYzine (ATARAX) 25 MG tablet Take 25 mg by mouth 3 times daily as needed for anxiety        insulin aspart (NOVOLOG FLEXPEN) 100 UNIT/ML pen Inject 1u/50>200 every 4 hours as needed for high blood glucose. Up to 20 units daily.  Indications: Diabetes Mellitus        insulin glargine (LANTUS PEN) 100 UNIT/ML pen Inject 12 Units Subcutaneous every morning Takes around 12noon        insulin pen needle (BD GRISEL U/F) 32G X 4 MM miscellaneous Inject 1 each Subcutaneous        lactulose (CHRONULAC) 10 GM/15ML solution TAKE 30 MLS BY MOUTH 3 TIMES DAILY (Patient taking differently: 3 times daily ) 3784 mL 5      levothyroxine (SYNTHROID/LEVOTHROID) 200 MCG tablet Take 200 mcg by mouth        metoclopramide (REGLAN) 10 MG tablet Take 10 mg by mouth        Multiple Vitamin (MULTIVITAMIN PO) Take 2 tablets by mouth every morning         multivitamin (DEKAS ESSENTIAL) capsule Take 1 capsule by mouth daily         omeprazole (PRILOSEC) 20 MG DR capsule Take 1 capsule (20 mg) by mouth daily 30 capsule 0      ondansetron (ZOFRAN-ODT) 4 MG ODT tab Place 1 tablet (4 mg) under the tongue every 6 hours as needed for nausea 120 tablet 1      polyethylene glycol (MIRALAX) 17 g packet Take 1 packet by mouth daily as needed for constipation        prochlorperazine (COMPAZINE) 10 MG tablet Take 1 tablet (10 mg) by mouth every 6 hours as needed for nausea or vomiting 120 tablet 1      rifampin (RIFADIN) 300 MG capsule TAKE 1 CAPSULE BY MOUTH EVERY DAY (Patient taking differently: every evening )  90 capsule 3      rifaximin (XIFAXAN) 550 MG TABS tablet Take 1 tablet (550 mg) by mouth 2 times daily 60 tablet 11      Rimegepant Sulfate 75 MG TBDP Take 75 mg by mouth        simethicone (MYLICON) 80 MG chewable tablet Take 80 mg by mouth every 6 hours as needed for flatulence or cramping        sitagliptin (JANUVIA) 100 MG tablet Take 100 mg by mouth every morning         spironolactone (ALDACTONE) 100 MG tablet Take 2.5 tablets (250 mg) by mouth daily (Patient taking differently: Take 250 mg by mouth every morning ) 75 tablet 11      SUMAtriptan (IMITREX) 50 MG tablet Take 50 mg by mouth at onset of headache for migraine         topiramate (TOPAMAX) 50 MG tablet Take 50 mg by mouth daily         traZODone (DESYREL) 100 MG tablet Take  mg by mouth nightly as needed for sleep        valACYclovir (VALTREX) 1000 mg tablet Take 1,000 mg by mouth daily as needed (at onset of cold sore)         venlafaxine (EFFEXOR) 75 MG tablet Take 1 tablet (75 mg) by mouth At Bedtime 30 tablet 0      vitamin A 3 MG (65312 UNITS) capsule Take 10,000 Units by mouth every morning         vitamin C (ASCORBIC ACID) 1000 MG TABS         vitamin D3 (CHOLECALCIFEROL) 50 mcg (2000 units) tablet Take 1 tablet (50 mcg) by mouth daily 90 tablet 3      vitamin D3 (CHOLECALCIFEROL) 50 mcg (2000 units) tablet Take 1 tablet (50 mcg) by mouth daily (Patient taking differently: Take 1 tablet by mouth every morning ) 90 tablet 3      vitamin E (TOCOPHEROL) 400 units (180 mg) capsule Take 400 Units by mouth every morning         Vitamin K, Phytonadione, 100 MCG TABS Take 100 mcg by mouth every morning       @          Review of Systems:    ROS: 10 point ROS neg other than the symptoms noted above in the HPI.          Physical Exam:   Blood pressure 109/74, pulse 79, temperature 98.3  F (36.8  C), temperature source Oral, resp. rate 20, SpO2 96 %, not currently breastfeeding. There is no height or weight on file to calculate BMI.  Date 12/07/21  0700 - 12/08/21 0659   Shift [id number] 2315-6978 4242-0659 24 Hour Total   INTAKE   Shift Total       OUTPUT   Urine 500   500   Shift Total 500   500   Weight (kg)         General: In no acute distress, mild facial muscle wasting  Neuro: Drowsy, Ox3, No asterixis, word finding difficulty  HEENT: PERRL mild scleral icterus, Nooral lesions  Lymph:  Nocervical lymphadenoapthy  CV: S1/S2with gr 2/6 murmurs, Skin warm and dry  Lungs: clear to auscultation, diminished bases Respirations even and nonlabored on room air  Abd: Nondistended, nontender  Extrem: Noperipehral edema  Skin: trace jaundice  Psych: flat         Data:     Lab Results   Component Value Date    WBC 6.6 12/07/2021    WBC 4.0 07/05/2021     Lab Results   Component Value Date    RBC 3.20 12/07/2021    RBC 3.55 07/05/2021     Lab Results   Component Value Date    HGB 10.0 12/07/2021    HGB 11.5 07/05/2021     Lab Results   Component Value Date    HCT 30.7 12/07/2021    HCT 34.4 07/05/2021     No components found for: MCT  Lab Results   Component Value Date    MCV 96 12/07/2021    MCV 97 07/05/2021     Lab Results   Component Value Date    MCH 31.3 12/07/2021    MCH 32.4 07/05/2021     Lab Results   Component Value Date    MCHC 32.6 12/07/2021    MCHC 33.4 07/05/2021     Lab Results   Component Value Date    RDW 17.2 12/07/2021    RDW 14.7 07/05/2021     Lab Results   Component Value Date    PLT 65 12/07/2021     07/05/2021       Last Basic Metabolic Panel:  Lab Results   Component Value Date     12/07/2021     07/05/2021      Lab Results   Component Value Date    POTASSIUM 3.1 12/07/2021    POTASSIUM 3.0 07/05/2021     Lab Results   Component Value Date    CHLORIDE 107 12/07/2021    CHLORIDE 107 07/05/2021     Lab Results   Component Value Date    MAURI 8.3 12/07/2021    MAURI 7.9 07/05/2021     Lab Results   Component Value Date    CO2 20 12/07/2021    CO2 29 07/05/2021     Lab Results   Component Value Date    BUN 9 12/07/2021    BUN 11  07/05/2021     Lab Results   Component Value Date    CR 0.70 12/07/2021    CR 1.10 07/05/2021     Lab Results   Component Value Date     12/07/2021     12/07/2021     07/05/2021       Liver Function Studies -   Recent Labs   Lab Test 12/07/21  0544   PROTTOTAL 6.2*   ALBUMIN 3.2*   BILITOTAL 2.7*   ALKPHOS 104   AST 20   ALT 12       Lab Results   Component Value Date    INR 2.26 12/07/2021       MELD-Na score: 20 at 12/7/2021  5:44 AM  MELD score: 20 at 12/7/2021  5:44 AM  Calculated from:  Serum Creatinine: 0.70 mg/dL (Using min of 1 mg/dL) at 12/7/2021  5:44 AM  Serum Sodium: 138 mmol/L (Using max of 137 mmol/L) at 12/7/2021  5:44 AM  Total Bilirubin: 2.7 mg/dL at 12/7/2021  5:44 AM  INR(ratio): 2.26 at 12/7/2021  5:44 AM  Age: 51 years           Previous Endoscopy:     EGD 4/1/21  Impression:          - Normal examined jejunum.                        - Faustino-en-Y gastrojejunostomy with                        gastrojejunal anastomosis                        characterized by healthy appearing                        mucosa.                        - Gastric-enteric fistula.                        - Grade I esophageal varices.       IMAGING:    MRI w/wo 11/22/21  IMPRESSION:     1. Motion artifact significantly degrades image quality.   2. Cirrhosis and portal hypertension including splenomegaly, large right pleural effusion and moderate ascites.   3. No definite lesion meeting imaging criteria for hepatocellular carcinoma in the liver, though again images degraded by motion artifact.   4. No indeterminate or suspicious liver lesion.   5. Patency of TIPS cannot be established on this examination. Doppler ultrasound evaluation of the TIPS could be performed to establish patency if clinically appropriate.   6. Moderate lymphadenopathy upper abdomen likely due to liver disease.   6. Multiple cystic liver lesions likely represent intraductal papillary mucinous neoplasms are not definitively changed in  appearance from recent CT 03/22/2021. Recommend attention on follow-up MRI. If follow-up liver MRI not performed, dedicated MRCP would be recommended in one year.      CT head 12/4/21  IMPRESSION:  No acute intracranial finding.    CT chest/abd wo 12/3/21  IMPRESSION:     1. Dilation of the entire colon, which is filled with liquid stool. No definite surrounding inflammatory change, though this could represent adynamic ileus (as with Granville syndrome/colonic pseudoobstruction) or developing colitis.   2. No additional suspicious finding in the chest, abdomen, or pelvis.    CXR 12/3/21  FINDINGS:  Single view obtained of the chest. Right PICC line tip courses upward along the neck off the superior edge of the image. Repositioning recommended. No pneumothorax or pleural effusion. Slightly rotated film. No cardiomegaly. No acute consolidation.    US TIPS 11/6/21  Impression:   1. Patent TIPS with sluggish flow but without hemodynamically  significant stenosis.  2. Antegrade flow in the main portal vein with retrograde flow in the  right portal vein. The left portal vein is not visualized, however was  patent on the same day CT. This is likely technical.  3. Cirrhotic configuration of the liver with evidence of portal  hypertension including splenomegaly.  4. Again seen is diffuse wall thickening of the gallbladder wall  without additional evidence of cholecystitis, likely reactive.    CT abd w 11/6/21  IMPRESSION:   1. Slightly increased wall thickening of the cecum, rectosigmoid  colon, and gallbladder likely representing worsening edema in setting  of ascites, right pleural effusion, and soft tissue edema. Infection  is possible but less likely given normal white count.   2. Cirrhotic liver with patent TIPS.   3. Colonic diverticulosis without evidence for acute diverticulitis.   4. Unremarkable sequelae gastric bypass with Faustino-en-Y reconstruction.  No evidence of obstruction.    XR Chest Port 1  View    Narrative  Exam: XR CHEST PORT 1 VIEW, 11/7/2021 10:47 AM    Indication: fever, r/o PNA    Comparison: 12/5/2020    Findings:  Increase hazy opacity over the right thorax with blunting of the left  costophrenic angle. Low lung volumes. Heart and pulmonary vasculature  within normal limits.    Impression  Impression:  1. There is a new small right pleural effusion. The hazy opacity  throughout the right lung could be related to the effusion. Pulmonary  opacity from pneumonia isn't excluded.  2. Low lung volume accounts for the prominence of the pulmonary  vasculature.    CHRISTOS DONG MD

## 2021-12-07 NOTE — PROGRESS NOTES
12/07/21 0939   General Information   Onset of Illness/Injury or Date of Surgery 12/06/21   Referring Physician Lia Pike PA   Patient/Family Therapy Goal Statement (SLP) None stated   Pertinent History of Current Problem Susan Puckett is a 51 year old female admitted on 12/6/2021. 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, DMII, normocytic/macrocytic anemia, thrombocytopenia, depression, hypothyroidism with recent hospitalization 9/18-9/23/21 wt recurrent pleural effusion/hydrothorax complicated by bacteremia with strep gallalyticus thought liver as source with 2 weeks of ceftriaxone outpatient, who presented to [facility] 11/28 with gradual worsening SOB, dry cough, chest pressure consistent with recurrent pleural effusion, underwent thoracentesis 11/29, became more lethargic 12/2 concerning for hepatic encephalopathy, started on cefepime and flagyl empirically and increased lactulose and transfer to [facility] was requested, being admitted 12/6 to [facility] for ongoing management of hepatology consultation. Clincial swallow eval completed per MD orders to further assess oropharyngeal swallow function.    Past History of Dysphagia No hx of dysphagia per chart review. Per pt report, she has noticed some fatigue with chewing, especially meats. Also reports some decreased appetite.    Type of Evaluation   Type of Evaluation Swallow Evaluation   Oral Motor   Oral Musculature generally intact   Structural Abnormalities none present   Mucosal Quality adequate   Dentition (Oral Motor)   Dentition (Oral Motor) adequate dentition   Facial Symmetry (Oral Motor)   Facial Symmetry (Oral Motor) WNL   Lip Function (Oral Motor)   Lip Range of Motion (Oral Motor) WNL   Tongue Function (Oral Motor)   Tongue ROM (Oral Motor) WNL   Jaw Function (Oral Motor)   Jaw Function (Oral Motor) WNL   Cough/Swallow/Gag Reflex (Oral Motor)   Soft  Palate/Velum (Oral Motor) WNL   Vocal Quality/Secretion Management (Oral Motor)   Vocal Quality (Oral Motor) WNL   General Swallowing Observations   Current Diet/Method of Nutritional Intake (General Swallowing Observations, NIS) thin liquids (level 0);regular diet   Respiratory Support (General Swallowing Observations) none   Swallowing Evaluation Clinical swallow evaluation   Clinical Swallow Evaluation   Feeding Assistance no assistance needed   Clinical Swallow Evaluation Textures Trialed thin liquids;pureed;solid foods   Clinical Swallow Eval: Thin Liquid Texture Trial   Mode of Presentation, Thin Liquids straw;self-fed   Volume of Liquid or Food Presented 4 oz   Oral Phase of Swallow WFL   Pharyngeal Phase of Swallow intact   Diagnostic Statement No overt s/sx of aspiration    Clinical Swallow Evaluation: Puree Solid Texture Trial   Mode of Presentation, Puree spoon;self-fed   Volume of Puree Presented 4 oz   Oral Phase, Puree WFL   Pharyngeal Phase, Puree intact   Diagnostic Statement No overt s/sx of aspiration   Clinical Swallow Evaluation: Solid Food Texture Trial   Mode of Presentation spoon;self-fed   Volume Presented 3 tbsp   Oral Phase WFL   Pharyngeal Phase intact   Diagnostic Statement No overt s/sx of aspiration   Esophageal Phase of Swallow   Patient reports or presents with symptoms of esophageal dysphagia Yes   Esophageal comments Hx of esophageal varices, hx of nurys en y   Swallowing Recommendations   Diet Consistency Recommendations thin liquids (level 0);regular diet   Supervision Level for Intake patient independent   Mode of Delivery Recommendations bolus size, small;food moistened;slow rate of intake   Swallowing Maneuver Recommendations alternate food and liquid intake   Monitoring/Assistance Required (Eating/Swallowing) monitor for cough or change in vocal quality with intake;stop eating activities when fatigue is present;optimize oral intake to minimize need for tube feeding    Recommended Feeding/Eating Techniques (Swallow Eval) maintain upright sitting position for eating;maintain upright posture during/after eating for 30 minutes;minimize distractions during oral intake;moisten oral mucosa prior to intake;provide 6 smaller meals throughout day;set-up and prepare tray   Medication Administration Recommendations, Swallowing (SLP) as tolerated   General Therapy Interventions   Planned Therapy Interventions Dysphagia Treatment   Dysphagia treatment Compensatory strategies for swallowing;Instruction of safe swallow strategies   SLP Therapy Assessment/Plan   Criteria for Skilled Therapeutic Interventions Met (SLP Eval) current level of function same as previous level of function   SLP Diagnosis Oropharyngeal swallow WFL   Therapy Frequency (SLP Eval) one time eval and treatment only   Comment, Therapy Assessment/Plan (SLP) Clinical swallow eval completed per MD orders. Pt presents with functional oropharyngeal swallowing mechanism. Pt's oral musculature WFL. Pt with c/o fatigue with chewing, especially dry meats. Pt tolerated thin liquids via straw, pureed textures, and regular solid textures with no overt s/sx of aspiration. Pt with functional time for mastication. Recommend pt continue regular texturess and thin liquids. Pt should be fully upright and alert for all PO, self-select softer textures, slow pacing, and consider smaller, more frequent meals for energy conservation. Pt verbalized good understanding. No additional ST needs indicated. Will complete orders.    Therapy Plan Review/Discharge Plan (SLP)   Therapy Plan Review (SLP) evaluation/treatment results reviewed;care plan/treatment goals reviewed;risks/benefits reviewed;current/potential barriers reviewed;participants voiced agreement with care plan;participants included;patient   Demonstrates Need for Referral to Another Service (SLP) occupational therapist;physical therapist;clinical nutrition services/dietitian   SLP  Discharge Planning    SLP Discharge Recommendation (DC Rec) home   SLP Rationale for DC Rec no additional ST needs indicated   SLP Brief overview of current status  Recommend regular textures and thin liquids. Pt should be fully upright and alert for all PO, self-select softer textures, slow pacing, and consider smaller, more frequent meals for energy conservation. Consider RD consult as pt reports overall reduced appetite.     Total Evaluation Time   Total Evaluation Time (Minutes) 14

## 2021-12-07 NOTE — PLAN OF CARE
"  Problem: Adult Inpatient Plan of Care  Goal: Optimal Comfort and Wellbeing  Outcome: Improving     Problem: Mood Impairment (Anxiety Signs/Symptoms)  Goal: Improved Mood Symptoms (Anxiety Signs/Symptoms)  Outcome: Declining     Problem: Sleep Disturbance (Anxiety Signs/Symptoms)  Goal: Improved Sleep (Anxiety Signs/Symptoms)  Outcome: Declining  Flowsheets (Taken 12/6/2021 8207)  Mutually Determined Action Steps (Improved Sleep):   sleeps 4-6 hours at night   identifies disturbance factors   uses relaxation techniques     Problem: Somatic Disturbance (Anxiety Signs/Symptoms)  Goal: Improved Somatic Symptoms (Anxiety Signs/Symptoms)  Outcome: Declining     Pt made multiple complaints regarding staffs, NST, nurses, roommate and doctors. Pt complained of roommate making a \"hmm\" sound insinuating judgmental remarks. Pt stated that she is being treated unfairly because staffs assume that she abused alcohol due to liver disease. Pt felt uncared for by staffs. Pt stated that she can hear people talking about her in the hallway. Pt refused to have certain procedure until she is ready. Crying and sobbing. Reassurance provided. Aromatherapy provided. Vistaril given. Melatonin given for insomnia. Multiple phone calls made and received. Pt was heard yelling on the phone. Refused clear liquid diet. MD was notified and ok to eat food as tolerated. No coughing noted with swallowing. Denies throat discomfort. Refused lactulose and insulins. Food given and pt ate 75% of food. US done to right arm and DVT noted. Result called to MD and Lovenox will be initiated. Urine sample sent to lab for UA/UC. PIV ordered and pt refused stating that she is not ready. Fall precautions in place. Bed alarm on. Call light provided. Will continue to monitor.   "

## 2021-12-08 ENCOUNTER — APPOINTMENT (OUTPATIENT)
Dept: OCCUPATIONAL THERAPY | Facility: CLINIC | Age: 51
DRG: 442 | End: 2021-12-08
Attending: INTERNAL MEDICINE
Payer: COMMERCIAL

## 2021-12-08 LAB
ALBUMIN SERPL-MCNC: 3.1 G/DL (ref 3.4–5)
ALP SERPL-CCNC: 103 U/L (ref 40–150)
ALT SERPL W P-5'-P-CCNC: 14 U/L (ref 0–50)
AMMONIA PLAS-SCNC: 43 UMOL/L (ref 10–50)
ANION GAP SERPL CALCULATED.3IONS-SCNC: 4 MMOL/L (ref 3–14)
AST SERPL W P-5'-P-CCNC: 20 U/L (ref 0–45)
BILIRUB SERPL-MCNC: 2.4 MG/DL (ref 0.2–1.3)
BUN SERPL-MCNC: 5 MG/DL (ref 7–30)
CALCIUM SERPL-MCNC: 8.7 MG/DL (ref 8.5–10.1)
CHLORIDE BLD-SCNC: 109 MMOL/L (ref 94–109)
CO2 SERPL-SCNC: 24 MMOL/L (ref 20–32)
CREAT SERPL-MCNC: 0.64 MG/DL (ref 0.52–1.04)
ERYTHROCYTE [DISTWIDTH] IN BLOOD BY AUTOMATED COUNT: 17.8 % (ref 10–15)
GFR SERPL CREATININE-BSD FRML MDRD: >90 ML/MIN/1.73M2
GLUCOSE BLD-MCNC: 310 MG/DL (ref 70–99)
GLUCOSE BLDC GLUCOMTR-MCNC: 187 MG/DL (ref 70–99)
GLUCOSE BLDC GLUCOMTR-MCNC: 199 MG/DL (ref 70–99)
GLUCOSE BLDC GLUCOMTR-MCNC: 221 MG/DL (ref 70–99)
GLUCOSE BLDC GLUCOMTR-MCNC: 228 MG/DL (ref 70–99)
GLUCOSE BLDC GLUCOMTR-MCNC: 245 MG/DL (ref 70–99)
GLUCOSE BLDC GLUCOMTR-MCNC: 249 MG/DL (ref 70–99)
GLUCOSE BLDC GLUCOMTR-MCNC: 254 MG/DL (ref 70–99)
GLUCOSE BLDC GLUCOMTR-MCNC: 266 MG/DL (ref 70–99)
HAPTOGLOB SERPL-MCNC: 7 MG/DL (ref 32–197)
HCT VFR BLD AUTO: 28.5 % (ref 35–47)
HGB BLD-MCNC: 9.5 G/DL (ref 11.7–15.7)
HOLD SPECIMEN: NORMAL
INR PPP: 2.22 (ref 0.85–1.15)
MAGNESIUM SERPL-MCNC: 2 MG/DL (ref 1.6–2.3)
MCH RBC QN AUTO: 31.4 PG (ref 26.5–33)
MCHC RBC AUTO-ENTMCNC: 33.3 G/DL (ref 31.5–36.5)
MCV RBC AUTO: 94 FL (ref 78–100)
PLATELET # BLD AUTO: 61 10E3/UL (ref 150–450)
POTASSIUM BLD-SCNC: 3.6 MMOL/L (ref 3.4–5.3)
POTASSIUM BLD-SCNC: 3.6 MMOL/L (ref 3.4–5.3)
PROT SERPL-MCNC: 5.8 G/DL (ref 6.8–8.8)
RBC # BLD AUTO: 3.03 10E6/UL (ref 3.8–5.2)
SODIUM SERPL-SCNC: 137 MMOL/L (ref 133–144)
WBC # BLD AUTO: 5.6 10E3/UL (ref 4–11)

## 2021-12-08 PROCEDURE — 80053 COMPREHEN METABOLIC PANEL: CPT | Performed by: PHYSICIAN ASSISTANT

## 2021-12-08 PROCEDURE — 82140 ASSAY OF AMMONIA: CPT | Performed by: HOSPITALIST

## 2021-12-08 PROCEDURE — 99232 SBSQ HOSP IP/OBS MODERATE 35: CPT | Performed by: STUDENT IN AN ORGANIZED HEALTH CARE EDUCATION/TRAINING PROGRAM

## 2021-12-08 PROCEDURE — 120N000011 HC R&B TRANSPLANT UMMC

## 2021-12-08 PROCEDURE — 250N000013 HC RX MED GY IP 250 OP 250 PS 637: Performed by: STUDENT IN AN ORGANIZED HEALTH CARE EDUCATION/TRAINING PROGRAM

## 2021-12-08 PROCEDURE — 99233 SBSQ HOSP IP/OBS HIGH 50: CPT | Performed by: NURSE PRACTITIONER

## 2021-12-08 PROCEDURE — 84132 ASSAY OF SERUM POTASSIUM: CPT | Performed by: STUDENT IN AN ORGANIZED HEALTH CARE EDUCATION/TRAINING PROGRAM

## 2021-12-08 PROCEDURE — 36415 COLL VENOUS BLD VENIPUNCTURE: CPT | Performed by: HOSPITALIST

## 2021-12-08 PROCEDURE — 97530 THERAPEUTIC ACTIVITIES: CPT | Mod: GO | Performed by: OCCUPATIONAL THERAPIST

## 2021-12-08 PROCEDURE — 97535 SELF CARE MNGMENT TRAINING: CPT | Mod: GO | Performed by: OCCUPATIONAL THERAPIST

## 2021-12-08 PROCEDURE — 250N000013 HC RX MED GY IP 250 OP 250 PS 637: Performed by: PHYSICIAN ASSISTANT

## 2021-12-08 PROCEDURE — 83735 ASSAY OF MAGNESIUM: CPT | Performed by: HOSPITALIST

## 2021-12-08 PROCEDURE — 99207 PR CDG-MDM COMPONENT: MEETS MODERATE - DOWN CODED: CPT | Performed by: STUDENT IN AN ORGANIZED HEALTH CARE EDUCATION/TRAINING PROGRAM

## 2021-12-08 PROCEDURE — 250N000011 HC RX IP 250 OP 636: Performed by: PHYSICIAN ASSISTANT

## 2021-12-08 PROCEDURE — 85610 PROTHROMBIN TIME: CPT | Performed by: PHYSICIAN ASSISTANT

## 2021-12-08 PROCEDURE — 36415 COLL VENOUS BLD VENIPUNCTURE: CPT | Performed by: PHYSICIAN ASSISTANT

## 2021-12-08 PROCEDURE — 85027 COMPLETE CBC AUTOMATED: CPT | Performed by: PHYSICIAN ASSISTANT

## 2021-12-08 RX ORDER — CARBOXYMETHYLCELLULOSE SODIUM 5 MG/ML
2 SOLUTION/ DROPS OPHTHALMIC
Status: DISCONTINUED | OUTPATIENT
Start: 2021-12-08 | End: 2021-12-09 | Stop reason: HOSPADM

## 2021-12-08 RX ADMIN — SPIRONOLACTONE 100 MG: 100 TABLET ORAL at 09:00

## 2021-12-08 RX ADMIN — THERA TABS 2 TABLET: TAB at 08:51

## 2021-12-08 RX ADMIN — LACTULOSE 20 G: 20 SOLUTION ORAL at 13:10

## 2021-12-08 RX ADMIN — INSULIN ASPART 2 UNITS: 100 INJECTION, SOLUTION INTRAVENOUS; SUBCUTANEOUS at 17:26

## 2021-12-08 RX ADMIN — Medication 1 TABLET: at 08:52

## 2021-12-08 RX ADMIN — FUROSEMIDE 40 MG: 40 TABLET ORAL at 09:00

## 2021-12-08 RX ADMIN — Medication 10000 UNITS: at 08:51

## 2021-12-08 RX ADMIN — Medication 50 MCG: at 08:52

## 2021-12-08 RX ADMIN — PANCRELIPASE 3 CAPSULE: 30000; 6000; 19000 CAPSULE, DELAYED RELEASE PELLETS ORAL at 17:18

## 2021-12-08 RX ADMIN — GLYCERIN, PETROLATUM, PHENYLEPHRINE HCL, PRAMOXINE HCL: 144; 2.5; 10; 15 CREAM TOPICAL at 09:02

## 2021-12-08 RX ADMIN — ATORVASTATIN CALCIUM 20 MG: 20 TABLET, FILM COATED ORAL at 09:00

## 2021-12-08 RX ADMIN — LACTULOSE 20 G: 20 SOLUTION ORAL at 20:06

## 2021-12-08 RX ADMIN — ENOXAPARIN SODIUM 70 MG: 80 INJECTION SUBCUTANEOUS at 13:12

## 2021-12-08 RX ADMIN — RIFAXIMIN 550 MG: 550 TABLET ORAL at 09:00

## 2021-12-08 RX ADMIN — FOLIC ACID 1 MG: 1 TABLET ORAL at 08:53

## 2021-12-08 RX ADMIN — Medication 1 TABLET: at 17:18

## 2021-12-08 RX ADMIN — ENOXAPARIN SODIUM 70 MG: 80 INJECTION SUBCUTANEOUS at 22:10

## 2021-12-08 RX ADMIN — GLYCERIN, PETROLATUM, PHENYLEPHRINE HCL, PRAMOXINE HCL: 144; 2.5; 10; 15 CREAM TOPICAL at 13:09

## 2021-12-08 RX ADMIN — PANCRELIPASE 3 CAPSULE: 30000; 6000; 19000 CAPSULE, DELAYED RELEASE PELLETS ORAL at 13:09

## 2021-12-08 RX ADMIN — PANCRELIPASE 3 CAPSULE: 30000; 6000; 19000 CAPSULE, DELAYED RELEASE PELLETS ORAL at 09:01

## 2021-12-08 RX ADMIN — INSULIN ASPART 1 UNITS: 100 INJECTION, SOLUTION INTRAVENOUS; SUBCUTANEOUS at 13:12

## 2021-12-08 RX ADMIN — Medication 400 UNITS: at 08:53

## 2021-12-08 RX ADMIN — PANTOPRAZOLE SODIUM 40 MG: 40 TABLET, DELAYED RELEASE ORAL at 09:04

## 2021-12-08 RX ADMIN — GLYCERIN, PETROLATUM, PHENYLEPHRINE HCL, PRAMOXINE HCL 26 G: 144; 2.5; 10; 15 CREAM TOPICAL at 20:07

## 2021-12-08 RX ADMIN — TOPIRAMATE 50 MG: 50 TABLET ORAL at 09:00

## 2021-12-08 RX ADMIN — LACTULOSE 20 G: 20 SOLUTION ORAL at 17:17

## 2021-12-08 RX ADMIN — LEVOTHYROXINE SODIUM 200 MCG: 0.2 TABLET ORAL at 08:50

## 2021-12-08 RX ADMIN — VENLAFAXINE HYDROCHLORIDE 75 MG: 75 CAPSULE, EXTENDED RELEASE ORAL at 22:09

## 2021-12-08 RX ADMIN — LACTULOSE 20 G: 20 SOLUTION ORAL at 01:08

## 2021-12-08 RX ADMIN — LACTULOSE 20 G: 20 SOLUTION ORAL at 08:54

## 2021-12-08 RX ADMIN — FERROUS SULFATE TAB 325 MG (65 MG ELEMENTAL FE) 325 MG: 325 (65 FE) TAB at 08:53

## 2021-12-08 RX ADMIN — INSULIN ASPART 3 UNITS: 100 INJECTION, SOLUTION INTRAVENOUS; SUBCUTANEOUS at 08:58

## 2021-12-08 RX ADMIN — CARBOXYMETHYLCELLULOSE SODIUM 2 DROP: 5 SOLUTION/ DROPS OPHTHALMIC at 17:18

## 2021-12-08 RX ADMIN — RIFAXIMIN 550 MG: 550 TABLET ORAL at 20:06

## 2021-12-08 ASSESSMENT — ACTIVITIES OF DAILY LIVING (ADL)
ADLS_ACUITY_SCORE: 22
ADLS_ACUITY_SCORE: 21
ADLS_ACUITY_SCORE: 22
ADLS_ACUITY_SCORE: 21
ADLS_ACUITY_SCORE: 21
ADLS_ACUITY_SCORE: 22
ADLS_ACUITY_SCORE: 21
ADLS_ACUITY_SCORE: 22
ADLS_ACUITY_SCORE: 21
ADLS_ACUITY_SCORE: 22

## 2021-12-08 NOTE — CONSULTS
Interventional Radiology Inpatient Consult Service Note    IR contacted by JORGE Vicente. IR Dr. Storey placed a TIPS on 11/5 for recurrent hepatic hydrothorax. Patient admitted with hepatic encephalopathy. Hepatology Dr. Cook wanted IR to take a look and consider the need to narrow the TIPS.     Dr. Storey made aware of consult. He will reach out to Dr. Cook to discuss together. He believes it might be a bit too soon to reduce he shunt.

## 2021-12-08 NOTE — PROGRESS NOTES
Pt ambulated to the bathroom with stand by assist, Gait very unsteady and pt stated that she is unsure about herself. Remain continent of bowel and bladder. No acute distress noted at this time. Remain lethargic but will respond to all questions asked and tactile stimuli.

## 2021-12-08 NOTE — PROGRESS NOTES
Pt is more alert this morning having a conversation with her roommate. Ammonia level is within normal limits.  Pt made aware.

## 2021-12-08 NOTE — PLAN OF CARE
VS: stable   Neuro: A&O, labile mood - receiving PRN lactulose per [place]  Mobility: up SBA  Discomfort: no complaints of pain or nausea   LDAs: PIV intact and SL  Labs: TBili 2.4  Glu: checks achs, 245  : voiding spontaneously  GI: dark brown BM today  Plan:  Will continue to monitor

## 2021-12-08 NOTE — PLAN OF CARE
Pt noted to still be very lethargic but easy to arouse. Stated that she doesn't know why she is so sleep. Have administered Lactulose times 1 as scheduled and times 2 prn. Will notify on call physician.

## 2021-12-08 NOTE — PROGRESS NOTES
CLINICAL NUTRITION SERVICES - Brief NOTE     Nutrition Prescription    Malnutrition Status:    Moderate malnutrition in the context of acute on chronic illness    Recommendations already ordered by Registered Dietitian (RD):  None at this time     Future/Additional Recommendations:  -- monitor oral intake of meals  -- monitor weight trends     EVALUATION OF THE PROGRESS TOWARD GOALS   Diet: Moderate Consistent Carbohydrate  Intake:  Susan reports her appetite has been decreased x 2 years. She eats ~ 50% of what she ate before that.  Typically she will eat the following:  Scrambled eggs  Chicken/bake potato  Frozen vegetables  Smoothies with granola  Pork sausage  Chicken pot pies    She reports she struggles with her appetite and ideas of what to eat.   She dislikes nutritional supplements        NEW FINDINGS   Labs (12/8): BUN 5 mg/dL (L), Glucose 310 mg/dL (H)    MALNUTRITION  % Intake: < 75% for >/= 1 month (moderate)  % Weight Loss: Weight loss does not meet criteria  Subcutaneous Fat Loss: Facial region:  mild  Muscle Loss: Lower arm  (forearm):  mild and Dorsal hand:  mild  Fluid Accumulation/Edema: None noted  Malnutrition Diagnosis: Moderate malnutrition in the context of acute on chronic illness    INTERVENTIONS  Implementation  Nutrition Education: Discussed ideas for high calorie/high protein due to smaller portions and low sodium recipes. Patient verbalized understanding.  Handouts provided: High calorie/High Protein Recipes and Low-sodium recipes    Monitoring/Evaluation  Progress toward goals will be monitored and evaluated per protocol.    Marie Espinoza, MS/RD/ANDRES/JULIAN  7A RD Pager: [phone]

## 2021-12-08 NOTE — PLAN OF CARE
/78 (BP Location: Left arm)   Pulse 85   Temp 98.5  F (36.9  C) (Oral)   Resp 20   Wt 72.7 kg (160 lb 4.8 oz)   SpO2 99%   BMI 25.11 kg/m    [id number]  Pt is able to answer orientation questions however patient is confused. Patient on [place] protocol.   Patient vitally stable on RA, afebrile. BG ACHS. No pain. Tolerating moderate carb diet with fair appetite. PIV SL, PICC removed by provider this shift.  Only 2 BMS this shift, lactulose being given.  R arm bruised. Pt is SBA.   Will continue with POC and notify MD with changes or concerns.

## 2021-12-08 NOTE — PROGRESS NOTES
[facility]    Medicine Progress Note - Hospitalist Service, Gold 11       Date of Admission:  12/6/2021    Assessment & Plan      Susan Puckett is a 51 year old female admitted on 12/6/2021. 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, DMII, normocytic/macrocytic anemia, thrombocytopenia, depression, hypothyroidism with recent hospitalization 9/18-9/23/21 wt recurrent pleural effusion/hydrothorax complicated by bacteremia with strep gallalyticus thought liver as source with 2 weeks of ceftriaxone outpatient, who presented to [facility] 11/28 with gradual worsening SOB, dry cough, chest pressure consistent with recurrent pleural effusion, underwent thoracentesis 11/29, became more lethargic 12/2 concerning for hepatic encephalopathy, started on cefepime and flagyl empirically and increased lactulose and transfer to [facility] was requested, being admitted 12/6 to [facility] for ongoing management of decompensated cirrhosis.        Hepatic encephalopathy, improving   Decompensated CUETO cirrhosis s/p TIPS 11/5/2021  Hepatohydrothorax, recurrent  Complicated by esophageal varices (EGD 4/2021 grade 1 varices) and recurrent hepatic hydrothorax s/p TIPS 11/5/21. Underwent thoracentesis 11/29 at [facility] with 2L removed. Became more lethargic 12/2 concerning for hepatic encephalopathy with unknown cause. Workup at [facility] initiated with blood cultures. Pleural fluid not cultured when thoracentesis completed and but there was not enough ascitic fluid for diagnostic paracentesis.  Abdominal ultrasound on 11/29 at [facility] illustrated hepatic steatosis and patent TIPS with velocities up to 80 cm/sec. Started on empiric antibiotics with cefepime (12/03 - 12/06) and flagyl (12/03 - 12/06) but discontinued here on arrival.  Mental status improving with treatment of hepatic encephalopathy.  - Continue  PTA rifaximin and lactulose with [place] protocol  - Restarted spironolactone and Lasix 12/8  - Continue PTA Creon   - Continue omeprazole  - MELD labs daily   - Hepatology consult appreciated  - PT and OT consults   - She will likely continue to need serial thoracentesis once to twice per week for recurrent hydrothorax    Elevated indirect bilirubin  -LDH and hapto normal. Lower concern for hemolysis.  -We will consider peripheral smear but did recently receive blood transfusion.  -Daily CMP.    Nonocclusive DVT of right upper extremity, likely PICC associated  PICC line removed today in sterile fashion.  Has some persistent right upper extremity pain, will continue to monitor symptoms.  -Continue therapeutic Lovenox.    DMII with long term use of insulin   PTA on lantus 12 units HS, Januvia 100 mg daily. A1c 5.9 on 10/20/21.   -Increase Lantus back to prior to admission dose of 12 units nightly  - Med sliding scale insulin   - Hypoglycemic protocol   - Consistent carb diet      Iron deficiency anemia   Pancytopenia   Hgb dropped to 7.1on 12.4, unclear etiology, transfused 2 units pRBC at [facility] with recovery of hgb to 9.4.  Hemoglobin remained stable.  - Continue PTA ferrous fluconate 324 mg daily   -Daily CBC     Malnutrition   - Nutrition consult   - Continue calcium/vitamin D   - Continue multivitamin   - Vitamin E daily   - Vitamin A 6 mg daily   - Vitamin B12 1000 mcg weekly   - Stop prior to admission vitamin K supplementation  - Vitamin D    - Folic acid      GEORGETTE, resolved   Had increase in creatinine at [facility]. Received IV albumin 100 mg daily X 3 days, completed 12/4/21. Cr returned to baseline.   - CMP daily      Dysphagia   Ongoing chronic dysphagia. Scheduled for EGD in 1/2022.    -Speech consult appreciated, cleared for regular diet with thin textures.  Should sit upright when eating.  - Continue PPI      Hypothyroidism   TSH elevated at 54 on 11/1/21. Free T3 low and Free T4 wnl at  [facility].   - PTA on synthroid 200 mcg daily, continue      Migraines   - PTA on sumatriptan 100 mg PRN      Depression   Anxiety   - PTA on venlafaxine 150 mg HS    - Hydroxyzine TID PRN      Dyslipidemia   - Continue PTA atorvastatin 20 mg daily              Diet: Moderate Consistent Carb (60 g CHO per Meal) Diet    DVT Prophylaxis: Enoxaparin (Lovenox) SQ, therapeutic  Rossi Catheter: Not present  Central Lines: None  Code Status: Full Code      Disposition Plan   Expected Discharge: 12/10/2021     Anticipated discharge location:  Awaiting care coordination huddle         The patient's care was discussed with the Bedside Nurse, Patient and Hepatology Consultant.    Joe Lin MD  Hospitalist Service, [address]  Securely message with the Vocera Web Console (learn more here)  Text page via [employer] Paging/Directory    Please see sign in/sign out for up to date coverage information      ______________________________________________________________________    Interval History   Susan is feeling ok today. She notes that she can sometimes see things when she closes her eyes and most notably this happened when she closed her eyes in the shower yesterday. She denies abdominal pain. She is having bowel movements. Appetite is fair. Ambulating with ease. Right arm feels better now with PICC line removed.    Data reviewed today: I reviewed all medications, new labs and imaging results over the last 24 hours. I personally reviewed no images or EKG's today.    Physical Exam   Vital Signs: Temp: 97.5  F (36.4  C) Temp src: Axillary BP: 108/75 Pulse: 71   Resp: 16 SpO2: 98 % O2 Device: None (Room air)    Weight: 158 lbs 11.2 oz  GENERAL: Alert and oriented x 3. NAD.  HEENT: Anicteric sclera.   CV: RRR. S1, S2.  RESPIRATORY: Effort normal on RA. Lungs CTAB with no wheezing, rales, rhonchi.   GI: Abdomen soft and non distended, bowel sounds present. No tenderness, rebound,  guarding.   NEUROLOGICAL: Speech normal.  Moves all extremities symmetrically and equally.  Alert and oriented to person, place, time, situation.  EXTREMITIES: No peripheral edema. Intact bilateral pedal pulses.   SKIN: No jaundice. No rashes.  Right arm swollen.      Data   Recent Labs   Lab 12/08/21  1328 12/08/21  1312 12/08/21  0853 12/08/21  0425 12/08/21  0315 12/07/21  2144 12/07/21  1835 12/07/21  0815 12/07/21  0544 12/06/21  2148 12/06/21 2024   WBC  --   --   --   --  5.6  --  5.7  --  6.6  --  5.6   HGB  --   --   --   --  9.5*  --  10.1*  --  10.0*  --  9.7*   MCV  --   --   --   --  94  --  97  --  96  --  96   PLT  --   --   --   --  61*  --  66*  --  65*  --  51*   INR  --   --   --   --  2.22*  --   --   --  2.26*  --  2.06*   NA  --   --   --   --  137  --   --   --  138  --  138   POTASSIUM  --   --   --   --  3.6  3.6  --   --   --  3.1*  --  3.4   CHLORIDE  --   --   --   --  109  --   --   --  107  --  108   CO2  --   --   --   --  24  --   --   --  20  --  23   BUN  --   --   --   --  5*  --   --   --  9  --  11   CR  --   --   --   --  0.64  --   --   --  0.70  --  0.75   ANIONGAP  --   --   --   --  4  --   --   --  11  --  7   MAURI  --   --   --   --  8.7  --   --   --  8.3*  --  8.3*   * 187* 249*   < > 310*   < >  --    < > 178*   < > 284*   ALBUMIN  --   --   --   --  3.1*  --   --   --  3.2*  --  3.3*   PROTTOTAL  --   --   --   --  5.8*  --   --   --  6.2*  --  6.2*   BILITOTAL  --   --   --   --  2.4*  --   --   --  2.7*  --  2.4*   ALKPHOS  --   --   --   --  103  --   --   --  104  --  103   ALT  --   --   --   --  14  --   --   --  12  --  13   AST  --   --   --   --  20  --   --   --  20  --  20    < > = values in this interval not displayed.

## 2021-12-08 NOTE — PROGRESS NOTES
[facility]  HEPATOLOGY PROGRESS NOTE  Susan Puckett [id number]       CC: HE  SUBJECTIVE:  States her mentation has improved today and she is more steady. She denies fevers, abdominal pain or dyspnea. She reports having x4 BM's since yesterday and denies melena or hematochezia. She continues to feel mildly fatigued and complains more of eye burning.     ROS:  A 10-point review of systems was negative.    OBJECTIVE:  VS: /75 (BP Location: Left arm)   Pulse 71   Temp 97.5  F (36.4  C) (Axillary)   Resp 16   Wt 72 kg (158 lb 11.2 oz)   SpO2 98%   BMI 24.86 kg/m     General: In no acute distress, mild facial muscle wasting  Neuro: AOx3, No asterixis  Lymph: No cervical lymphadenopathy  HEENT:  trace scleral icterus, Nooral lesions  CV: . Skin warm and dry  Lungs:  Respirations even and nonlabored on room air  Abd:  nondistended nontender   Extrem: Noperipheral edema   Skin: Nojaundice  Psych: pleasant    MEDICATIONS:  Current Facility-Administered Medications   Medication     albuterol (PROVENTIL HFA/VENTOLIN HFA) inhaler     amylase-lipase-protease (CREON 6) [id number] units per capsule 3 capsule     atorvastatin (LIPITOR) tablet 20 mg     calcium carbonate (TUMS) chewable tablet 500 mg     calcium citrate-vitamin D (CITRACAL) 315-250 MG-UNIT per tablet 1 tablet     carboxymethylcellulose PF (REFRESH PLUS) 0.5 % ophthalmic solution 2 drop     cyanocobalamin (VITAMIN B-12) tablet 1,000 mcg     glucose gel 15-30 g    Or     dextrose 50 % injection 25-50 mL    Or     glucagon injection 1 mg     enoxaparin ANTICOAGULANT (LOVENOX) injection 70 mg     ferrous sulfate (FEROSUL) tablet 325 mg     folic acid (FOLVITE) tablet 1 mg     furosemide (LASIX) tablet 40 mg     hydrOXYzine (ATARAX) tablet 25 mg     insulin aspart (NovoLOG) injection (RAPID ACTING)     insulin aspart (NovoLOG) injection (RAPID ACTING)     insulin glargine (LANTUS PEN) injection 12 Units     lactulose (CHRONULAC) solution 20 g      lactulose (CHRONULAC) solution 20 g    Or     lactulose (CHRONULAC) solution for enema prep 100 g     levothyroxine (SYNTHROID/LEVOTHROID) tablet 200 mcg     lidocaine (LMX4) cream     lidocaine 1 % 0.1-1 mL     LORazepam (ATIVAN) tablet 0.5 mg     LORazepam (ATIVAN) tablet 0.5 mg     melatonin tablet 3 mg     multivitamin, therapeutic (THERA-VIT) tablet 2 tablet     ondansetron (ZOFRAN-ODT) ODT tab 4 mg    Or     ondansetron (ZOFRAN) injection 4 mg     pantoprazole (PROTONIX) EC tablet 40 mg     polyethylene glycol (MIRALAX) Packet 17 g     pramox-pe-glycerin-petrolatum (PREPARATION H) cream     prochlorperazine (COMPAZINE) injection 10 mg    Or     prochlorperazine (COMPAZINE) tablet 10 mg    Or     prochlorperazine (COMPAZINE) suppository 25 mg     rifaximin (XIFAXAN) tablet 550 mg     simethicone (MYLICON) chewable tablet 80 mg     sodium chloride (PF) 0.9% PF flush 3 mL     sodium chloride (PF) 0.9% PF flush 3 mL     spironolactone (ALDACTONE) tablet 100 mg     SUMAtriptan (IMITREX) tablet 50 mg     topiramate (TOPAMAX) tablet 50 mg     [Held by provider] traZODone (DESYREL) tablet  mg     venlafaxine (EFFEXOR-XR) 24 hr capsule 75 mg     vitamin A capsule 10,000 Units     Vitamin D3 (CHOLECALCIFEROL) tablet 50 mcg     vitamin E (TOCOPHEROL) 400 units (180 mg) capsule 400 Units       REVIEW OF LABORATORY, PATHOLOGY AND IMAGING RESULTS:  [facility]  Recent Labs   Lab 12/08/21  0853 12/08/21  0715 12/08/21  0425 12/08/21  0315 12/07/21  0815 12/07/21  0544 12/06/21  2148 12/06/21 2024   NA  --   --   --  137  --  138  --  138   POTASSIUM  --   --   --  3.6  3.6  --  3.1*  --  3.4   CHLORIDE  --   --   --  109  --  107  --  108   MAURI  --   --   --  8.7  --  8.3*  --  8.3*   CO2  --   --   --  24  --  20  --  23   BUN  --   --   --  5*  --  9  --  11   CR  --   --   --  0.64  --  0.70  --  0.75   * 245* 266* 310*   < > 178*   < > 284*    < > = values in this interval not displayed.     CBC  Recent Labs   Lab  12/08/21  0315 12/07/21  1835 12/07/21  0544 12/06/21 2024   WBC 5.6 5.7 6.6 5.6   RBC 3.03* 3.17* 3.20* 3.07*   HGB 9.5* 10.1* 10.0* 9.7*   HCT 28.5* 30.6* 30.7* 29.6*   MCV 94 97 96 96   MCH 31.4 31.9 31.3 31.6   MCHC 33.3 33.0 32.6 32.8   RDW 17.8* 17.4* 17.2* 17.4*   PLT 61* 66* 65* 51*     INR  Recent Labs   Lab 12/08/21 0315 12/07/21  0544 12/06/21 2024   INR 2.22* 2.26* 2.06*     LFTs  Recent Labs   Lab 12/08/21 0315 12/07/21  0544 12/06/21 2024   ALKPHOS 103 104 103   AST 20 20 20   ALT 14 12 13   BILITOTAL 2.4* 2.7* 2.4*   PROTTOTAL 5.8* 6.2* 6.2*   ALBUMIN 3.1* 3.2* 3.3*      PANCNo lab results found in last 7 days.   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 She is currently listed for liver transplant.     RECOMMENDATIONS:  1. CUETO cirrhosis  - MELD 19, active for liver transplant, ABO A  - US doppler 11/29 without ascites or HCC. Patent TIPS  - no current ascites.      2. Hepatic encephalopathy  3. S/p TIPS for hepatic hydrothorax  - recent episode likely multifactorial with recent TIPS, dehydration due to diuretics and lactulose.   - Slow improvement in mentation with correction of electrolytes and volume status.   -  Continue with lactulose and rifaximin 550 mg BID for HE.   - IR reviewed TIPS  US with plans to hold on intervention at this time. With current HE, opening TIPS due to recent hepatic hydrothorax could worsen episodes of confusion. Narrowing would not be reversible and could precipitate hydrothorax. Will continue to monitor.   - no current evidence of dyspnea and no pleural effusion on CXR 12/3. Continue to monitor, if thora performed, please send diagnostic sample.  - restarted diuretics with lasix 40 and spironolactone 100 daily. This is half of her prior OP dose so have room to increase.      4. Anemia  - Bilirubin has been mostly indirect, retic count appropriate elevated, haptoglobin low, LDH on upper limits of normal. Peripheral smear not obtained due to recent PRBC. Her hemoglobin has overall decreased since TIPS- she may have some level of shearing of RBC through TIPS. Stable today.   - Supportive care if hgb <7    5. Malnutrition  6. RYGB  7. Pancreatic insufficiency  - encouraged 1.5 gm/kg protein intake at home. She does not care for po protein supplements.   - fecal elastase 2019 <15.      Patient was discussed with attending physician, Dr. Gutierrez      Next follow up appointment: Dr. Cook 1/6/22    Thank you for the opportunity to be involved with  Susan Puckett care. Please call with any questions or concerns.    CAIN Mccormick, CNP  Inpatient Hepatology [facility]  Text Link

## 2021-12-08 NOTE — PROGRESS NOTES
Return call received from on call Physician. No new medication orders received at this time. MD stated that if patient is not confused, he will continue current orders and check ammonia level. Will continue to monitor patient and address any abnormal findings.

## 2021-12-09 ENCOUNTER — APPOINTMENT (OUTPATIENT)
Dept: PHYSICAL THERAPY | Facility: CLINIC | Age: 51
DRG: 442 | End: 2021-12-09
Attending: PHYSICIAN ASSISTANT
Payer: COMMERCIAL

## 2021-12-09 VITALS
TEMPERATURE: 97.7 F | SYSTOLIC BLOOD PRESSURE: 103 MMHG | RESPIRATION RATE: 16 BRPM | DIASTOLIC BLOOD PRESSURE: 70 MMHG | OXYGEN SATURATION: 96 % | WEIGHT: 151.8 LBS | HEART RATE: 66 BPM | BODY MASS INDEX: 23.78 KG/M2

## 2021-12-09 LAB
ALBUMIN SERPL-MCNC: 3 G/DL (ref 3.4–5)
ALP SERPL-CCNC: 103 U/L (ref 40–150)
ALT SERPL W P-5'-P-CCNC: 14 U/L (ref 0–50)
ANION GAP SERPL CALCULATED.3IONS-SCNC: 3 MMOL/L (ref 3–14)
AST SERPL W P-5'-P-CCNC: 25 U/L (ref 0–45)
BILIRUB SERPL-MCNC: 2.3 MG/DL (ref 0.2–1.3)
BUN SERPL-MCNC: 6 MG/DL (ref 7–30)
CALCIUM SERPL-MCNC: 8.5 MG/DL (ref 8.5–10.1)
CHLORIDE BLD-SCNC: 107 MMOL/L (ref 94–109)
CO2 SERPL-SCNC: 27 MMOL/L (ref 20–32)
CREAT SERPL-MCNC: 0.62 MG/DL (ref 0.52–1.04)
GFR SERPL CREATININE-BSD FRML MDRD: >90 ML/MIN/1.73M2
GLUCOSE BLD-MCNC: 172 MG/DL (ref 70–99)
GLUCOSE BLDC GLUCOMTR-MCNC: 153 MG/DL (ref 70–99)
GLUCOSE BLDC GLUCOMTR-MCNC: 157 MG/DL (ref 70–99)
GLUCOSE BLDC GLUCOMTR-MCNC: 243 MG/DL (ref 70–99)
INR PPP: 1.92 (ref 0.85–1.15)
POTASSIUM BLD-SCNC: 3.4 MMOL/L (ref 3.4–5.3)
PROT SERPL-MCNC: 5.6 G/DL (ref 6.8–8.8)
SODIUM SERPL-SCNC: 137 MMOL/L (ref 133–144)

## 2021-12-09 PROCEDURE — 99239 HOSP IP/OBS DSCHRG MGMT >30: CPT | Performed by: STUDENT IN AN ORGANIZED HEALTH CARE EDUCATION/TRAINING PROGRAM

## 2021-12-09 PROCEDURE — 36415 COLL VENOUS BLD VENIPUNCTURE: CPT | Performed by: PHYSICIAN ASSISTANT

## 2021-12-09 PROCEDURE — 99233 SBSQ HOSP IP/OBS HIGH 50: CPT | Performed by: NURSE PRACTITIONER

## 2021-12-09 PROCEDURE — 85610 PROTHROMBIN TIME: CPT | Performed by: PHYSICIAN ASSISTANT

## 2021-12-09 PROCEDURE — 250N000013 HC RX MED GY IP 250 OP 250 PS 637: Performed by: PHYSICIAN ASSISTANT

## 2021-12-09 PROCEDURE — 250N000013 HC RX MED GY IP 250 OP 250 PS 637: Performed by: STUDENT IN AN ORGANIZED HEALTH CARE EDUCATION/TRAINING PROGRAM

## 2021-12-09 PROCEDURE — 97161 PT EVAL LOW COMPLEX 20 MIN: CPT | Mod: GP

## 2021-12-09 PROCEDURE — 250N000011 HC RX IP 250 OP 636: Performed by: PHYSICIAN ASSISTANT

## 2021-12-09 PROCEDURE — 250N000013 HC RX MED GY IP 250 OP 250 PS 637: Performed by: HOSPITALIST

## 2021-12-09 PROCEDURE — 80053 COMPREHEN METABOLIC PANEL: CPT | Performed by: PHYSICIAN ASSISTANT

## 2021-12-09 RX ORDER — SPIRONOLACTONE 100 MG/1
100 TABLET, FILM COATED ORAL DAILY
Qty: 30 TABLET | Refills: 1 | Status: ON HOLD | OUTPATIENT
Start: 2021-12-09 | End: 2021-12-28

## 2021-12-09 RX ORDER — APIXABAN 5 MG (74)
KIT ORAL
Qty: 1 EACH | Refills: 0 | Status: SHIPPED | OUTPATIENT
Start: 2021-12-09 | End: 2022-01-08

## 2021-12-09 RX ORDER — FUROSEMIDE 40 MG
40 TABLET ORAL DAILY
Qty: 90 TABLET | Refills: 3 | Status: SHIPPED | OUTPATIENT
Start: 2021-12-09 | End: 2021-12-09

## 2021-12-09 RX ORDER — ACETAMINOPHEN 325 MG/1
325 TABLET ORAL ONCE
Status: COMPLETED | OUTPATIENT
Start: 2021-12-09 | End: 2021-12-09

## 2021-12-09 RX ORDER — FUROSEMIDE 40 MG
40 TABLET ORAL DAILY
Qty: 90 TABLET | Refills: 3 | Status: ON HOLD | OUTPATIENT
Start: 2021-12-09 | End: 2021-12-28

## 2021-12-09 RX ORDER — SPIRONOLACTONE 100 MG/1
100 TABLET, FILM COATED ORAL DAILY
Qty: 30 TABLET | Refills: 1 | Status: SHIPPED | OUTPATIENT
Start: 2021-12-09 | End: 2021-12-09

## 2021-12-09 RX ORDER — APIXABAN 5 MG (74)
KIT ORAL
Qty: 1 EACH | Refills: 0 | Status: ON HOLD | OUTPATIENT
Start: 2021-12-09 | End: 2021-12-28

## 2021-12-09 RX ORDER — LACTULOSE 10 G/15ML
10 SOLUTION ORAL 3 TIMES DAILY
Status: DISCONTINUED | OUTPATIENT
Start: 2021-12-09 | End: 2021-12-09 | Stop reason: HOSPADM

## 2021-12-09 RX ADMIN — RIFAXIMIN 550 MG: 550 TABLET ORAL at 08:42

## 2021-12-09 RX ADMIN — Medication 400 UNITS: at 08:45

## 2021-12-09 RX ADMIN — LACTULOSE 10 G: 20 SOLUTION ORAL at 13:49

## 2021-12-09 RX ADMIN — Medication 1 TABLET: at 08:45

## 2021-12-09 RX ADMIN — THERA TABS 2 TABLET: TAB at 08:45

## 2021-12-09 RX ADMIN — PANCRELIPASE 3 CAPSULE: 30000; 6000; 19000 CAPSULE, DELAYED RELEASE PELLETS ORAL at 08:39

## 2021-12-09 RX ADMIN — INSULIN ASPART 1 UNITS: 100 INJECTION, SOLUTION INTRAVENOUS; SUBCUTANEOUS at 08:48

## 2021-12-09 RX ADMIN — LACTULOSE 20 G: 20 SOLUTION ORAL at 08:46

## 2021-12-09 RX ADMIN — LEVOTHYROXINE SODIUM 200 MCG: 0.2 TABLET ORAL at 08:43

## 2021-12-09 RX ADMIN — GLYCERIN, PETROLATUM, PHENYLEPHRINE HCL, PRAMOXINE HCL: 144; 2.5; 10; 15 CREAM TOPICAL at 08:50

## 2021-12-09 RX ADMIN — FERROUS SULFATE TAB 325 MG (65 MG ELEMENTAL FE) 325 MG: 325 (65 FE) TAB at 08:44

## 2021-12-09 RX ADMIN — INSULIN ASPART 3 UNITS: 100 INJECTION, SOLUTION INTRAVENOUS; SUBCUTANEOUS at 13:52

## 2021-12-09 RX ADMIN — CARBOXYMETHYLCELLULOSE SODIUM 2 DROP: 5 SOLUTION/ DROPS OPHTHALMIC at 08:56

## 2021-12-09 RX ADMIN — FUROSEMIDE 40 MG: 40 TABLET ORAL at 08:41

## 2021-12-09 RX ADMIN — ACETAMINOPHEN 325 MG: 325 TABLET, FILM COATED ORAL at 08:43

## 2021-12-09 RX ADMIN — TOPIRAMATE 50 MG: 50 TABLET ORAL at 08:41

## 2021-12-09 RX ADMIN — PANCRELIPASE 3 CAPSULE: 30000; 6000; 19000 CAPSULE, DELAYED RELEASE PELLETS ORAL at 13:51

## 2021-12-09 RX ADMIN — ATORVASTATIN CALCIUM 20 MG: 20 TABLET, FILM COATED ORAL at 08:42

## 2021-12-09 RX ADMIN — SPIRONOLACTONE 100 MG: 100 TABLET ORAL at 09:54

## 2021-12-09 RX ADMIN — ONDANSETRON 4 MG: 4 TABLET, ORALLY DISINTEGRATING ORAL at 09:51

## 2021-12-09 RX ADMIN — GLYCERIN, PETROLATUM, PHENYLEPHRINE HCL, PRAMOXINE HCL: 144; 2.5; 10; 15 CREAM TOPICAL at 13:50

## 2021-12-09 RX ADMIN — ENOXAPARIN SODIUM 70 MG: 80 INJECTION SUBCUTANEOUS at 13:54

## 2021-12-09 RX ADMIN — Medication 10000 UNITS: at 08:45

## 2021-12-09 RX ADMIN — FOLIC ACID 1 MG: 1 TABLET ORAL at 08:45

## 2021-12-09 RX ADMIN — Medication 50 MCG: at 08:44

## 2021-12-09 RX ADMIN — PANTOPRAZOLE SODIUM 40 MG: 40 TABLET, DELAYED RELEASE ORAL at 08:42

## 2021-12-09 ASSESSMENT — ACTIVITIES OF DAILY LIVING (ADL)
ADLS_ACUITY_SCORE: 20
ADLS_ACUITY_SCORE: 22
ADLS_ACUITY_SCORE: 20
ADLS_ACUITY_SCORE: 22
ADLS_ACUITY_SCORE: 11
ADLS_ACUITY_SCORE: 20
ADLS_ACUITY_SCORE: 22
ADLS_ACUITY_SCORE: 22
ADLS_ACUITY_SCORE: 20

## 2021-12-09 NOTE — PROGRESS NOTES
[facility]  HEPATOLOGY PROGRESS NOTE  Susan Puckett [id number]       CC: HE  SUBJECTIVE:  States her BM's are more watery than formed. Feels mentation at baseline but dose still have some unsteadiness with standing. Denies fevers, abdominal pain, melena or hematochezia. She does have a headache today that is mildly improving with water intake.     ROS:  A 10-point review of systems was negative.    OBJECTIVE:  VS: /70 (BP Location: Left arm)   Pulse 66   Temp 97.7  F (36.5  C) (Oral)   Resp 16   Wt 68.9 kg (151 lb 12.8 oz)   SpO2 96%   BMI 23.78 kg/m     General: In no acute distress, mild facial muscle wasting  Neuro: AOx3, No asterixis  Lymph: No cervical lymphadenopathy  HEENT:  trace scleral icterus, Nooral lesions  CV: . Skin warm and dry  Lungs:  Respirations even and nonlabored on room air  Abd:  nondistended nontender   Extrem: Noperipheral edema   Skin: Nojaundice  Psych: pleasant    MEDICATIONS:  Current Facility-Administered Medications   Medication     albuterol (PROVENTIL HFA/VENTOLIN HFA) inhaler     amylase-lipase-protease (CREON 6) [id number] units per capsule 3 capsule     atorvastatin (LIPITOR) tablet 20 mg     calcium carbonate (TUMS) chewable tablet 500 mg     calcium citrate-vitamin D (CITRACAL) 315-250 MG-UNIT per tablet 1 tablet     carboxymethylcellulose PF (REFRESH PLUS) 0.5 % ophthalmic solution 2 drop     cyanocobalamin (VITAMIN B-12) tablet 1,000 mcg     glucose gel 15-30 g    Or     dextrose 50 % injection 25-50 mL    Or     glucagon injection 1 mg     enoxaparin ANTICOAGULANT (LOVENOX) injection 70 mg     ferrous sulfate (FEROSUL) tablet 325 mg     folic acid (FOLVITE) tablet 1 mg     furosemide (LASIX) tablet 40 mg     hydrOXYzine (ATARAX) tablet 25 mg     insulin aspart (NovoLOG) injection (RAPID ACTING)     insulin aspart (NovoLOG) injection (RAPID ACTING)     insulin glargine (LANTUS PEN) injection 12 Units     lactulose (CHRONULAC) solution 10 g     lactulose  (CHRONULAC) solution 20 g    Or     lactulose (CHRONULAC) solution for enema prep 100 g     levothyroxine (SYNTHROID/LEVOTHROID) tablet 200 mcg     lidocaine (LMX4) cream     lidocaine 1 % 0.1-1 mL     LORazepam (ATIVAN) tablet 0.5 mg     melatonin tablet 3 mg     multivitamin, therapeutic (THERA-VIT) tablet 2 tablet     ondansetron (ZOFRAN-ODT) ODT tab 4 mg    Or     ondansetron (ZOFRAN) injection 4 mg     pantoprazole (PROTONIX) EC tablet 40 mg     polyethylene glycol (MIRALAX) Packet 17 g     pramox-pe-glycerin-petrolatum (PREPARATION H) cream     prochlorperazine (COMPAZINE) injection 10 mg    Or     prochlorperazine (COMPAZINE) tablet 10 mg    Or     prochlorperazine (COMPAZINE) suppository 25 mg     rifaximin (XIFAXAN) tablet 550 mg     simethicone (MYLICON) chewable tablet 80 mg     sodium chloride (PF) 0.9% PF flush 3 mL     sodium chloride (PF) 0.9% PF flush 3 mL     spironolactone (ALDACTONE) tablet 100 mg     SUMAtriptan (IMITREX) tablet 50 mg     topiramate (TOPAMAX) tablet 50 mg     [Held by provider] traZODone (DESYREL) tablet  mg     venlafaxine (EFFEXOR-XR) 24 hr capsule 75 mg     vitamin A capsule 10,000 Units     Vitamin D3 (CHOLECALCIFEROL) tablet 50 mcg     vitamin E (TOCOPHEROL) 400 units (180 mg) capsule 400 Units       REVIEW OF LABORATORY, PATHOLOGY AND IMAGING RESULTS:  [facility]  Recent Labs   Lab 12/09/21  1206 12/09/21  0744 12/09/21  0610 12/09/21  0558 12/08/21  0425 12/08/21  0315 12/07/21  0815 12/07/21  0544 12/06/21  2148 12/06/21 2024   NA  --   --  137  --   --  137  --  138  --  138   POTASSIUM  --   --  3.4  --   --  3.6  3.6  --  3.1*  --  3.4   CHLORIDE  --   --  107  --   --  109  --  107  --  108   MAURI  --   --  8.5  --   --  8.7  --  8.3*  --  8.3*   CO2  --   --  27  --   --  24  --  20  --  23   BUN  --   --  6*  --   --  5*  --  9  --  11   CR  --   --  0.62  --   --  0.64  --  0.70  --  0.75   * 157* 172* 153*   < > 310*   < > 178*   < > 284*    < > = values in  this interval not displayed.     CBC  Recent Labs   Lab 12/08/21 0315 12/07/21  1835 12/07/21  0544 12/06/21 2024   WBC 5.6 5.7 6.6 5.6   RBC 3.03* 3.17* 3.20* 3.07*   HGB 9.5* 10.1* 10.0* 9.7*   HCT 28.5* 30.6* 30.7* 29.6*   MCV 94 97 96 96   MCH 31.4 31.9 31.3 31.6   MCHC 33.3 33.0 32.6 32.8   RDW 17.8* 17.4* 17.2* 17.4*   PLT 61* 66* 65* 51*     INR  Recent Labs   Lab 12/09/21  0610 12/08/21  0315 12/07/21  0544 12/06/21 2024   INR 1.92* 2.22* 2.26* 2.06*     LFTs  Recent Labs   Lab 12/09/21  0610 12/08/21  0315 12/07/21  0544 12/06/21 2024   ALKPHOS 103 103 104 103   AST 25 20 20 20   ALT 14 14 12 13   BILITOTAL 2.3* 2.4* 2.7* 2.4*   PROTTOTAL 5.6* 5.8* 6.2* 6.2*   ALBUMIN 3.0* 3.1* 3.2* 3.3*      PANCNo lab results found in last 7 days.   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 She is currently listed for liver transplant.     RECOMMENDATIONS:  1. CUETO cirrhosis  - MELD 17, active for liver transplant, ABO A  - US doppler 11/29 without ascites or HCC. Patent TIPS  - no current ascites.      2. Hepatic encephalopathy  3. S/p TIPS for hepatic hydrothorax  - recent episode likely multifactorial with recent TIPS, dehydration due to diuretics and lactulose.   - Slow improvement in mentation with correction  of electrolytes and volume status.   -  Continue with lactulose and rifaximin 550 mg BID for HE.   - IR reviewed TIPS US with plans to hold on intervention at this time. With current HE, opening TIPS due to recent hepatic hydrothorax could worsen episodes of confusion. Narrowing would not be reversible and could precipitate hydrothorax. Will continue to monitor.   - no current evidence of dyspnea and no pleural effusion on CXR 12/3. Continue to monitor, if thora performed, please send diagnostic sample.  - restarted diuretics with lasix 40 and spironolactone 100 daily. This is half of her prior OP dose so have room to increase.      4. Anemia  - Bilirubin has been mostly indirect, retic count appropriate elevated, haptoglobin low, LDH on upper limits of normal. Peripheral smear not obtained due to recent PRBC. Her hemoglobin has overall decreased since TIPS- she may have some level of shearing of RBC through TIPS. Stable today.   - Supportive care if hgb <7    5. Malnutrition  6. RYGB  7. Pancreatic insufficiency  - encouraged 1.5 gm/kg protein intake at home. She does not care for po protein supplements.   - fecal elastase 2019 <15.      Patient was discussed with attending physician, Dr. Sadler    Next follow up appointment: Dr. Cook 1/6/22    Thank you for the opportunity to be involved with  Susan Puckett care. Please call with any questions or concerns.    CAIN Mccormick, CNP  Inpatient Hepatology [facility]  Text Link

## 2021-12-09 NOTE — PROGRESS NOTES
Care Management Follow Up    Length of Stay (days): 3    Expected Discharge Date: 12/11/2021     Concerns to be Addressed: discharge planning,care coordination/care conferences     Patient plan of care discussed at interdisciplinary rounds: Yes    Anticipated Discharge Disposition: Home Care,Home     Anticipated Discharge Services: None  Anticipated Discharge DME: None    Patient/family educated on Medicare website which has current facility and service quality ratings: yes  Education Provided on the Discharge Plan:  Yes  Patient/Family in Agreement with the Plan: yes    Referrals Placed by CM/SW: Homecare,External Care Coordination      Additional Information:  Met briefly with pt as writer had noted pt may have been open to home care services prior to her hospitalization.   Pt was previously open to a home care agency for home therapies but is not currently receiving home care.  Pt notes she and her  have been overwhelmed with her current health needs.  Pt notes concerns with medication management.  Briefly discussed home RN support post hospitalization.  Pt would be open to having home health RN.  Provided pt with a list of Medicare star rated home care agencies.  Agreed to f/u with provider regarding home care RN.  Agreed to f/u with pt later this afternoon on home care agency preferences.     1200: Met with pt and spouse.  Pt and spouse open to referrals being made to [facility], [facility], [facility] and [facility].      Email referral made to [facility].  Per 7A  anticipate home today.     1320: Received email confirmation from [facility] that they are able to accept patient home RN.  Discharge home care orders updated.    Vinita Ansari RN BSN, PHN, ACM-RN  [facility] RN Care Coordinator  Phone: [phone]  Pager [phone]  To contact the [facility] RNCC, Page: [phone]    12/9/2021 11:03 AM

## 2021-12-09 NOTE — PLAN OF CARE
VS: stable  Neuro: A&O  Mobility: up SBA  Discomfort: endorses headache; received tylenol  LDAs: PIV removed   Glu: achs   : voiding  GI: last BM 12/9  Plan:  order for discharge - AVS reviewed with patient and . Anticoagulant handout given to couple and discussed. They verbalized understanding. Patient left unit in wheelchair escorted by

## 2021-12-09 NOTE — PROGRESS NOTES
Pt complained of a headache and requested some tylenol or Imitrex. MD paged. Awaiting return call.

## 2021-12-09 NOTE — PLAN OF CARE
Pt resting quietly in bed at this time with eyes closed. She has been much alert and oriented on this shift than she was yesterday. Pt was very talkative with roommate at the beginning of the shift carrying a pleasant conversation. Told me that she feels much better today aniceto she dis yesterday. Continue on lactulose for hepatic insufficiency. Skin warm and dry to touch. Resp even and unlabored. Bruise on right upper and lower arm very prominent at this time and pt aware of how the bruise occurred. Have not had any episode of lethargy. HOB at 30 degrees. Insulin administered earlier in the shift as ordered. No distress noted at this time. Denies pain through the shift. Will continue to monitor and address any abnormal findings.

## 2021-12-09 NOTE — PROGRESS NOTES
12/09/21 1417   Quick Adds   Type of Visit Initial PT Evaluation   Living Environment   People in home spouse   Current Living Arrangements house   Home Accessibility stairs to enter home;stairs within home   Number of Stairs, Main Entrance 3   Stair Railings, Main Entrance railings safe and in good condition   Transportation Anticipated family or friend will provide   Self-Care   Usual Activity Tolerance moderate   Current Activity Tolerance fair   Regular Exercise No   Equipment Currently Used at Home none   Disability/Function   Hearing Difficulty or Deaf no   Wear Glasses or Blind no   Vision Management Reading glasses   Concentrating, Remembering or Making Decisions Difficulty no   Difficulty Communicating no   Difficulty Eating/Swallowing no   Walking or Climbing Stairs Difficulty no   Dressing/Bathing Difficulty no   Toileting issues no   Doing Errands Independently Difficulty (such as shopping) no   Fall history within last six months no   Change in Functional Status Since Onset of Current Illness/Injury yes   General Information   Onset of Illness/Injury or Date of Surgery 12/06/21   Referring Physician Lia Pike PA   Patient/Family Therapy Goals Statement (PT) to go home.   Pertinent History of Current Problem (include personal factors and/or comorbidities that impact the POC) Susan Puckett is a 51 year old female admitted on 12/6/2021. 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, DMII, normocytic/macrocytic anemia, thrombocytopenia, depression, hypothyroidism with recent hospitalization 9/18-9/23/21 [facility] recurrent pleural effusion/hydrothorax complicated by bacteremia with strep gallalyticus thought liver as source with 2 weeks of ceftriaxone outpatient, who presented to [facility] 11/28 with gradual worsening SOB, dry cough, chest pressure consistent with recurrent pleural effusion, underwent thoracentesis  11/29, became more lethargic 12/2 concerning for hepatic encephalopathy, started on cefepime and flagyl empirically and increased lactulose and transfer to [facility] was requested, being admitted 12/6 to [facility] for ongoing management of hepatology consultation.    Cognition   Orientation Status (Cognition) oriented x 4   Pain Assessment   Patient Currently in Pain No   Integumentary/Edema   Integumentary/Edema no deficits were identifed   Posture    Posture Not impaired   Range of Motion (ROM)   ROM Quick Adds ROM WFL   Strength   Manual Muscle Testing Quick Adds Strength WFL   Bed Mobility   Comment (Bed Mobility) Ind   Transfers   Transfer Safety Comments SBA for safety   Gait/Stairs (Locomotion)   Comment (Gait/Stairs) Able to ambulate slowly in room with close CGA. Shuffling steps noted with small, uneven step/stride length. Patient requests a cane for discharge.    Balance   Balance Comments Unsteady with need for UE support occasionally.    Clinical Impression   Criteria for Skilled Therapeutic Intervention yes, treatment indicated   PT Diagnosis (PT) impaired mobility   Influenced by the following impairments weakness   Functional limitations due to impairments transfers and gait; stairs   Clinical Presentation Stable/Uncomplicated   Clinical Presentation Rationale clinical judgement   Clinical Decision Making (Complexity) low complexity   Therapy Frequency (PT) Daily   Predicted Duration of Therapy Intervention (days/wks) 1 week   Planned Therapy Interventions (PT) bed mobility training;balance training;gait training;home exercise program;neuromuscular re-education;stair training;strengthening;transfer training   Anticipated Equipment Needs at Discharge (PT) cane, straight   Risk & Benefits of therapy have been explained evaluation/treatment results reviewed;care plan/treatment goals reviewed;risks/benefits reviewed   PT Discharge Planning    PT Discharge Recommendation (DC Rec) home with home care physical therapy    PT Rationale for DC Rec Patient plans to discharge home today; she is moving slowly but safely. She reports she will have assistance from her  at home and in-home therapy. Cane to be delivered to patient's room prior to discharge.    PT Brief overview of current status  Min A x 1   Total Evaluation Time   Total Evaluation Time (Minutes) 15

## 2021-12-09 NOTE — PROGRESS NOTES
Care Management Follow Up    Length of Stay (days): 3    Expected Discharge Date: 12/11/2021     Concerns to be Addressed:       Patient plan of care discussed at interdisciplinary rounds: Yes    Anticipated Discharge Disposition:  Home     Anticipated Discharge Services:    Anticipated Discharge DME:      Patient/family educated on Medicare website which has current facility and service quality ratings:  RNCC provided home care agencies at bedside  Education Provided on the Discharge Plan:  Yes  Patient/Family in Agreement with the Plan:  Yes    Referrals Placed by CM/SW:    Private pay costs discussed: Not applicable    Additional Information:  This writer met with patient at bedside to follow up re: PCA services and finances. I provided supportive counseling regarding finances and reported that their financial situation may be out of this writers scope. I provided community resources and information regarding PCA services. I sent information to Susan and Roly via e-mail. I also provided gayathri care [facility] and [facility].     Susan did not have any additional questions. She was tearful during conversation and reported that she feels overwhelmed.       EMEKA PachecoSW

## 2021-12-09 NOTE — DISCHARGE SUMMARY
[facility]  Hospitalist Discharge Summary      Date of Admission:  12/6/2021  Date of Discharge:  12/9/2021  3:45 PM  Discharging Provider: Joe Lin MD  Discharge Team: Hospitalist Service Gold 11    Discharge Diagnoses    Hepatic encephalopathy, improving   Decompensated CUETO cirrhosis s/p TIPS 11/5/2021  Hepatohydrothorax, recurrent  Nonocclusive DVT of right upper extremity, likely PICC associated  DMII with long term use of insulin   Iron deficiency anemia   Pancytopenia likely 2/2 cirrhosis  GEORGETTE, resolved  Dysphagia  Hypothyroidism   Migraines   Depression   Anxiety   Dyslipidemia   Moderate malnutrition in the context of acute on chronic illness      Follow-ups Needed After Discharge   Follow-up Appointments     Follow Up ([facility])      1. Follow up with hepatology as scheduled             Discharge Disposition   Discharged to home    Hospital Course   Susan Puckett is a 51 year old female admitted on 12/6/2021 as a transfer from [facility] after admission there on 11/28 for recurrent hepatic hydrothorax. She has a history of decompensated liver cirrhosis and is currently listed for transplant. Her liver disease has been  complicated by hepatohydrothorax, s/p TIPS 11/5/21, DMII, normocytic/macrocytic anemia, thrombocytopenia, depression, hypothyroidism. She was ultimately transferred here on 12/6 for ongoing management of decompensated cirrhosis but her hospital stay here was brief and generally unremarkable. She was seen in consultation by hepatology who recommended restarting diuretics and will have the patient follow up in January in clinic.     On arrival, she was noted to have RUE swelling where a PICC line was placed. There was a DVT on US and apixaban was started after initial treatment with therapeutic lovenox. Removed PICC and galloway catheter prior to discharge.      --------------------------------------    Hepatic encephalopathy,  improving   Decompensated CUETO cirrhosis s/p TIPS 11/5/2021  Hepatohydrothorax, recurrent  Complicated by esophageal varices (EGD 4/2021 grade 1 varices) and recurrent hepatic hydrothorax s/p TIPS 11/5/21. Underwent thoracentesis 11/29 at [facility] with 2L removed. Became more lethargic 12/2 concerning for hepatic encephalopathy with unknown cause. Workup at [facility] initiated with blood cultures. Pleural fluid not cultured when thoracentesis completed and but there was not enough ascitic fluid for diagnostic paracentesis. Abdominal ultrasound on 11/29 at [facility] illustrated hepatic steatosis and patent TIPS with velocities up to 80 cm/sec. Started on empiric antibiotics with cefepime (12/03 - 12/06) and flagyl (12/03 - 12/06) but discontinued here on arrival. Mental status improving with treatment of hepatic encephalopathy. Seen by hepatology here. They will follow up with her after discharge.  - Continue PTA rifaximin and lactulose with [facility] protocol  - Restarted spironolactone at 100 mg daily and Lasix at 40 mg daily  - Continue PTA Creon   - Continue omeprazole  - Hepatology consult appreciated     Nonocclusive DVT of right upper extremity, likely PICC associated  PICC line removed today in sterile fashion.  Has some persistent right upper extremity pain, will continue to monitor symptoms.  -Continue apixaban x 3 months     DMII with long term use of insulin   PTA on lantus 12 units HS, Januvia 100 mg daily. A1c 5.9 on 10/20/21.   -Increase Lantus back to prior to admission dose of 12 units nightly  - Med sliding scale insulin   - Hypoglycemic protocol   - Consistent carb diet      Iron deficiency anemia   Pancytopenia   - Continue PTA ferrous fluconate 324 mg daily      Moderate malnutrition in the context of acute on chronic illness  - Nutrition consult   - Continue calcium/vitamin D   - Continue multivitamin   - Vitamin E daily   - Vitamin A 6 mg daily   - Vitamin B12 1000 mcg weekly   - Stop prior  to admission vitamin K supplementation  - Vitamin D    - Folic acid      GEORGETTE, resolved   Had increase in creatinine at [facility]. Received IV albumin 100 mg daily X 3 days, completed 12/4/21. Cr returned to baseline.      Dysphagia   Ongoing chronic dysphagia. Scheduled for EGD in 1/2022.    -Speech consult appreciated, cleared for regular diet with thin textures.  Should sit upright when eating.  - Continue PPI      Hypothyroidism   -  synthroid 200 mcg daily     Migraines   - PTA on sumatriptan 100 mg PRN      Depression   Anxiety   - PTA on venlafaxine 150 mg HS    - Hydroxyzine TID PRN      Dyslipidemia   - Continue PTA atorvastatin 20 mg daily          Consultations This Hospital Stay   GI HEPATOLOGY ADULT IP CONSULT  PHYSICAL THERAPY ADULT IP CONSULT  OCCUPATIONAL THERAPY ADULT IP CONSULT  NUTRITION SERVICES ADULT IP CONSULT  SPEECH LANGUAGE PATH ADULT IP CONSULT  VASCULAR ACCESS CARE ADULT IP CONSULT  VASCULAR ACCESS CARE ADULT IP CONSULT  VASCULAR ACCESS CARE ADULT IP CONSULT  INTERVENTIONAL RADIOLOGY ADULT/PEDS IP CONSULT  CARE MANAGEMENT / SOCIAL WORK IP CONSULT    Code Status   Full Code    Time Spent on this Encounter   IJoe MD, personally saw the patient today and spent greater than 30 minutes discharging this patient.       Joe Lin MD  [facility] [address] [address]  Phone: [phone]  ______________________________________________________________________    Physical Exam   Vital Signs: Temp: 97.7  F (36.5  C) Temp src: Oral BP: 103/70 Pulse: 66   Resp: 16 SpO2: 96 % O2 Device: None (Room air)    Weight: 151 lbs 12.8 oz  GENERAL: Alert and oriented x 3. NAD.  HEENT: Anicteric sclera.   CV: RRR. S1, S2.  RESPIRATORY: Effort normal on RA. Lungs CTAB with no wheezing, rales, rhonchi.   GI: Abdomen soft and non distended, bowel sounds present. No tenderness, rebound, guarding.   NEUROLOGICAL: Speech normal.  Moves all extremities  symmetrically and equally.  Alert and oriented to person, place, time, situation.  EXTREMITIES: No peripheral edema. Intact bilateral pedal pulses.   SKIN: No jaundice. No rashes.  Right arm swollen.         Primary Care Physician   Harleen Billy    Discharge Orders      Home Care PT Referral for Hospital Discharge      Home Care OT Referral for Hospital Discharge      Home care nursing referral      Reason for your hospital stay    You are in the hospital for decompensated cirrhosis.  Initially there was concern for infection and hepatic encephalopathy while you were at [facility].  You subsequently were transferred here and both of these things resolved and your liver function was improving by discharge.    Please follow-up with hepatology as scheduled for January.     Activity    Your activity upon discharge: activity as tolerated     MD face to face encounter    Documentation of Face to Face and Certification for Home Health Services    I certify that patient: Susan Puckett is under my care and that I, or a nurse practitioner or physician's assistant working with me, had a face-to-face encounter that meets the physician face-to-face encounter requirements with this patient on: 12/9/2021.    This encounter with the patient was in whole, or in part, for the following medical condition, which is the primary reason for home health care: Difficulty with medication management.    I certify that, based on my findings, the following services are medically necessary home health services: Nursing, Occupational Therapy, and Physical Therapy.    My clinical findings support the need for the above services because: Nurse is needed: To provide assessment and oversight required in the home to assure adherence to the medical plan due to: Multiple comorbidities, intermittent confusion related to liver disease, use of many medications., To provide caregiver training to assist with: Medication administration, medication  management., and To teach and train about the disease and treatments for cirrhosis illness, because intermittent confusion related to cirrhosis.., Occupational Therapy Services are needed to assess and treat cognitive ability and address ADL safety due to impairment in mobility, activities of daily living related to comorbidities involving liver disease and new DVT of the arm., and Physical Therapy Services are needed to assess and treat the following functional impairments: Deconditioning related to prolonged hospitalization, cirrhosis.    Further, I certify that my clinical findings support that this patient is homebound (i.e. absences from home require considerable and taxing effort and are for medical reasons or [facility] services or infrequently or of short duration when for other reasons) because: Leaving home is medically contraindicated for the following reason(s): Infection risk / immunocompromised state where it is safer for them to receive services in the home...    Based on the above findings. I certify that this patient is confined to the home and needs intermittent skilled nursing care, physical therapy and/or speech therapy.  The patient is under my care, and I have initiated the establishment of the plan of care.  This patient will be followed by a physician who will periodically review the plan of care.  Physician/Provider to provide follow up care: Harleen Billy    Attending hospital physician (the Medicare certified PECOS provider): Joe Lin MD  Physician Signature: See electronic signature associated with these discharge orders.  Date: 12/9/2021     MD face to face encounter    Documentation of Face to Face and Certification for Home Health Services    I certify that patient: Susan Puckett is under my care and that I, or a nurse practitioner or physician's assistant working with me, had a face-to-face encounter that meets the physician face-to-face encounter requirements with this patient  on: 12/9/2021.    This encounter with the patient was in whole, or in part, for the following medical condition, which is the primary reason for home health care: decompensated cirrhosis .    I certify that, based on my findings, the following services are medically necessary home health services: Nursing.    My clinical findings support the need for the above services because: Nurse is needed: To assess medication management after changes in medications or other medical regimen..    Further, I certify that my clinical findings support that this patient is homebound (i.e. absences from home require considerable and taxing effort and are for medical reasons or Che[employer] services or infrequently or of short duration when for other reasons) because: Requires assistance of another person or specialized equipment to access medical services because patient: Requires supervision of another for safe transfer...    Based on the above findings. I certify that this patient is confined to the home and needs intermittent skilled nursing care, physical therapy and/or speech therapy.  The patient is under my care, and I have initiated the establishment of the plan of care.  This patient will be followed by a physician who will periodically review the plan of care.  Physician/Provider to provide follow up care: Harleen Billy    Attending hospital physician (the Medicare certified PEC provider): Joe Lin MD  Physician Signature: See electronic signature associated with these discharge orders.  Date: 12/9/2021     Follow Up ([facility])    1. Follow up with hepatology as scheduled     Cane Order    DME Documentation:   Describe the reason for need to support medical necessity: impaired mobility.     I, the undersigned, certify that the above prescribed supplies are medically necessary for this patient and is both reasonable and necessary in reference to accepted standards of medical and necessary in reference to accepted  standards of medical practice in the treatment of this patient's condition and is not prescribed as a convenience.     Diet    Follow this diet upon discharge: Orders Placed This Encounter      Moderate Consistent Carb (60 g CHO per Meal) Diet       Significant Results and Procedures   Most Recent 3 CBC's:Recent Labs   Lab Test 12/08/21  0315 12/07/21  1835 12/07/21  0544   WBC 5.6 5.7 6.6   HGB 9.5* 10.1* 10.0*   MCV 94 97 96   PLT 61* 66* 65*     Most Recent 3 BMP's:Recent Labs   Lab Test 12/09/21  1206 12/09/21  0744 12/09/21  0610 12/08/21  0425 12/08/21  0315 12/07/21  0815 12/07/21  0544   NA  --   --  137  --  137  --  138   POTASSIUM  --   --  3.4  --  3.6  3.6  --  3.1*   CHLORIDE  --   --  107  --  109  --  107   CO2  --   --  27  --  24  --  20   BUN  --   --  6*  --  5*  --  9   CR  --   --  0.62  --  0.64  --  0.70   ANIONGAP  --   --  3  --  4  --  11   MAURI  --   --  8.5  --  8.7  --  8.3*   * 157* 172*   < > 310*   < > 178*    < > = values in this interval not displayed.     Most Recent 2 LFT's:Recent Labs   Lab Test 12/09/21  0610 12/08/21 0315   AST 25 20   ALT 14 14   ALKPHOS 103 103   BILITOTAL 2.3* 2.4*       Discharge Medications   Discharge Medication List as of 12/9/2021  2:30 PM      START taking these medications    Details   !! Apixaban Starter Pack (ELIQUIS DVT/PE STARTER PACK) 5 MG TBPK Take 10 mg by mouth 2 times daily for 7 days, THEN 5 mg 2 times daily for 23 days., Disp-1 each, R-0, E-Prescribe      !! Apixaban Starter Pack (ELIQUIS DVT/PE STARTER PACK) 5 MG TBPK Take 10 mg by mouth 2 times daily for 7 days, THEN 5 mg 2 times daily for 23 days., Disp-1 each, R-0, E-Prescribe       !! - Potential duplicate medications found. Please discuss with provider.      CONTINUE these medications which have CHANGED    Details   furosemide (LASIX) 40 MG tablet Take 1 tablet (40 mg) by mouth daily, Disp-90 tablet, R-3, E-Prescribe      spironolactone (ALDACTONE) 100 MG tablet Take 1 tablet  (100 mg) by mouth daily, Disp-30 tablet, R-1, E-Prescribe         CONTINUE these medications which have NOT CHANGED    Details   acetaminophen (TYLENOL) 500 MG tablet Take 1 tablet (500 mg) by mouth every 6 hours as needed for mild pain, No Print Out      acetone urine (KETOSTIX) test strip 1 stripHistorical      albuterol (PROAIR HFA/PROVENTIL HFA/VENTOLIN HFA) 108 (90 Base) MCG/ACT inhaler Inhale 1-2 puffs into the lungs, Historical      amylase-lipase-protease (CREON 6) 8774-52813-12417 units CPEP Take 3 capsules by mouth 3 times daily (with meals), Historical      atorvastatin (LIPITOR) 20 MG tablet Take 20 mg by mouth every morning , Historical      calcium carbonate (TUMS) 500 MG chewable tablet Take 1 tablet by mouth daily as needed for heartburn , Historical      calcium citrate-vitamin D (CITRACAL W/D) 250-100 MG-UNIT tablet Take 2 tablets by mouth 2 times daily (with meals), Disp-336 tablet, R-3, E-Prescribe      Continuous Blood Gluc  (DEXCOM G6 ) LUNA Use as directed for continuous glucose monitoring., Historical      Continuous Blood Gluc Sensor (DEXCOM G6 SENSOR) MISC Use as directed for continuous glucose monitoring.  Change sensor every 10 days., Historical      Continuous Blood Gluc Transmit (DEXCOM G6 TRANSMITTER) MISC Use as directed for continuous glucose monitoring.  Change every 3 months., Historical      cyanocobalamin (VITAMIN B-12) 1000 MCG tablet Take 1,000 mcg by mouth every 7 days , Historical      Ferrous Sulfate (IRON) 325 (65 FE) MG tablet Take 1 tablet by mouth every morning , Historical      folic acid (FOLVITE) 1 MG tablet Take 1 mg by mouth every morning , Historical      hydrOXYzine (ATARAX) 25 MG tablet Take 25 mg by mouth 3 times daily as needed for anxiety, Historical      insulin aspart (NOVOLOG FLEXPEN) 100 UNIT/ML pen Inject 1u/50>200 every 4 hours as needed for high blood glucose. Up to 20 units daily.  Indications: Diabetes Mellitus, Historical      insulin  glargine (LANTUS PEN) 100 UNIT/ML pen Inject 12 Units Subcutaneous every morning Takes around 12noon, HistoricalIf Lantus is not covered by insurance, may substitute Basaglar at same dose and frequency.        insulin pen needle (BD GRISEL U/F) 32G X 4 MM miscellaneous Inject 1 each SubcutaneousHistorical      lactulose (CHRONULAC) 10 GM/15ML solution TAKE 30 MLS BY MOUTH 3 TIMES DAILY, Disp-3784 mL, R-5, E-Prescribe      levothyroxine (SYNTHROID/LEVOTHROID) 200 MCG tablet Take 200 mcg by mouth, Historical      metoclopramide (REGLAN) 10 MG tablet Take 10 mg by mouth, Historical      Multiple Vitamin (MULTIVITAMIN PO) Take 2 tablets by mouth every morning , Historical      multivitamin (DEKAS ESSENTIAL) capsule Take 1 capsule by mouth daily , Historical      omeprazole (PRILOSEC) 20 MG DR capsule Take 1 capsule (20 mg) by mouth daily, Disp-30 capsule, R-0, E-Prescribe      ondansetron (ZOFRAN-ODT) 4 MG ODT tab Place 1 tablet (4 mg) under the tongue every 6 hours as needed for nausea, Disp-120 tablet, R-1, E-Prescribe      polyethylene glycol (MIRALAX) 17 g packet Take 1 packet by mouth daily as needed for constipation, Historical      prochlorperazine (COMPAZINE) 10 MG tablet Take 1 tablet (10 mg) by mouth every 6 hours as needed for nausea or vomiting, Disp-120 tablet, R-1, E-Prescribe      rifampin (RIFADIN) 300 MG capsule TAKE 1 CAPSULE BY MOUTH EVERY DAY, Disp-90 capsule, R-3, E-Prescribe      rifaximin (XIFAXAN) 550 MG TABS tablet Take 1 tablet (550 mg) by mouth 2 times daily, Disp-60 tablet, R-11, E-PrescribeCan give a 3 month supply if easier and/or cheaper for patient.      Rimegepant Sulfate 75 MG TBDP Take 75 mg by mouth, Historical      simethicone (MYLICON) 80 MG chewable tablet Take 80 mg by mouth every 6 hours as needed for flatulence or cramping, Historical      sitagliptin (JANUVIA) 100 MG tablet Take 100 mg by mouth every morning , Historical      SUMAtriptan (IMITREX) 50 MG tablet Take 50 mg by mouth  at onset of headache for migraine , Historical      topiramate (TOPAMAX) 50 MG tablet Take 50 mg by mouth daily , Historical      traZODone (DESYREL) 100 MG tablet Take  mg by mouth nightly as needed for sleep, Historical      valACYclovir (VALTREX) 1000 mg tablet Take 1,000 mg by mouth daily as needed (at onset of cold sore) , Historical      venlafaxine (EFFEXOR) 75 MG tablet Take 1 tablet (75 mg) by mouth At Bedtime, Disp-30 tablet, R-0, E-PrescribeRenewal requests go to Brooke Billy with [facility].      vitamin A 3 MG (34889 UNITS) capsule Take 10,000 Units by mouth every morning , Historical      vitamin C (ASCORBIC ACID) 1000 MG TABS Historical      !! vitamin D3 (CHOLECALCIFEROL) 50 mcg (2000 units) tablet Take 1 tablet (50 mcg) by mouth daily, Disp-90 tablet, R-3, E-Prescribe      !! vitamin D3 (CHOLECALCIFEROL) 50 mcg (2000 units) tablet Take 1 tablet (50 mcg) by mouth daily, Disp-90 tablet, R-3, E-Prescribe      vitamin E (TOCOPHEROL) 400 units (180 mg) capsule Take 400 Units by mouth every morning , Historical       !! - Potential duplicate medications found. Please discuss with provider.      STOP taking these medications       Vitamin K, Phytonadione, 100 MCG TABS Comments:   Reason for Stopping:             Allergies   Allergies   Allergen Reactions     Methylprednisolone Hives     Droperidol Anxiety     Nsaids Other (See Comments)     GI bleed.     Prednisone Anxiety, Hives and Rash

## 2021-12-10 ENCOUNTER — DOCUMENTATION ONLY (OUTPATIENT)
Dept: TRANSPLANT | Facility: CLINIC | Age: 51
End: 2021-12-10
Payer: COMMERCIAL

## 2021-12-10 DIAGNOSIS — K75.81 NASH (NONALCOHOLIC STEATOHEPATITIS): Primary | ICD-10-CM

## 2021-12-13 ENCOUNTER — APPOINTMENT (OUTPATIENT)
Dept: CT IMAGING | Facility: CLINIC | Age: 51
End: 2021-12-13
Attending: EMERGENCY MEDICINE
Payer: COMMERCIAL

## 2021-12-13 ENCOUNTER — PATIENT OUTREACH (OUTPATIENT)
Dept: GASTROENTEROLOGY | Facility: CLINIC | Age: 51
End: 2021-12-13
Payer: COMMERCIAL

## 2021-12-13 ENCOUNTER — HOSPITAL ENCOUNTER (EMERGENCY)
Facility: CLINIC | Age: 51
Discharge: HOME | End: 2021-12-13
Attending: EMERGENCY MEDICINE | Admitting: EMERGENCY MEDICINE
Payer: COMMERCIAL

## 2021-12-13 ENCOUNTER — APPOINTMENT (OUTPATIENT)
Dept: ULTRASOUND IMAGING | Facility: CLINIC | Age: 51
End: 2021-12-13
Attending: EMERGENCY MEDICINE
Payer: COMMERCIAL

## 2021-12-13 VITALS
HEART RATE: 69 BPM | TEMPERATURE: 98.7 F | DIASTOLIC BLOOD PRESSURE: 76 MMHG | RESPIRATION RATE: 16 BRPM | OXYGEN SATURATION: 99 % | SYSTOLIC BLOOD PRESSURE: 106 MMHG | WEIGHT: 158 LBS | BODY MASS INDEX: 25.39 KG/M2 | HEIGHT: 66 IN

## 2021-12-13 DIAGNOSIS — K75.81 LIVER CIRRHOSIS SECONDARY TO NASH (H): Primary | ICD-10-CM

## 2021-12-13 DIAGNOSIS — I82.621 ACUTE DEEP VEIN THROMBOSIS (DVT) OF BRACHIAL VEIN OF RIGHT UPPER EXTREMITY (H): ICD-10-CM

## 2021-12-13 DIAGNOSIS — K74.60 LIVER CIRRHOSIS SECONDARY TO NASH (H): Primary | ICD-10-CM

## 2021-12-13 LAB
ALBUMIN SERPL-MCNC: 3.2 G/DL (ref 3.4–5)
ALP SERPL-CCNC: 124 U/L (ref 40–150)
ALT SERPL W P-5'-P-CCNC: 19 U/L (ref 0–50)
ANION GAP SERPL CALCULATED.3IONS-SCNC: 5 MMOL/L (ref 3–14)
APTT PPP: 47 SECONDS (ref 22–38)
AST SERPL W P-5'-P-CCNC: 38 U/L (ref 0–45)
BASOPHILS # BLD AUTO: 0 10E3/UL (ref 0–0.2)
BASOPHILS NFR BLD AUTO: 1 %
BILIRUB SERPL-MCNC: 2.2 MG/DL (ref 0.2–1.3)
BUN SERPL-MCNC: 10 MG/DL (ref 7–30)
CALCIUM SERPL-MCNC: 8.6 MG/DL (ref 8.5–10.1)
CHLORIDE BLD-SCNC: 108 MMOL/L (ref 94–109)
CO2 SERPL-SCNC: 27 MMOL/L (ref 20–32)
CREAT SERPL-MCNC: 0.73 MG/DL (ref 0.52–1.04)
EOSINOPHIL # BLD AUTO: 0.1 10E3/UL (ref 0–0.7)
EOSINOPHIL NFR BLD AUTO: 4 %
ERYTHROCYTE [DISTWIDTH] IN BLOOD BY AUTOMATED COUNT: 19.5 % (ref 10–15)
GFR SERPL CREATININE-BSD FRML MDRD: >90 ML/MIN/1.73M2
GLUCOSE BLD-MCNC: 177 MG/DL (ref 70–99)
HCG SERPL QL: NEGATIVE
HCT VFR BLD AUTO: 31.5 % (ref 35–47)
HGB BLD-MCNC: 10 G/DL (ref 11.7–15.7)
IMM GRANULOCYTES # BLD: 0 10E3/UL
IMM GRANULOCYTES NFR BLD: 1 %
INR PPP: 2.54 (ref 0.85–1.15)
LYMPHOCYTES # BLD AUTO: 0.7 10E3/UL (ref 0.8–5.3)
LYMPHOCYTES NFR BLD AUTO: 22 %
MCH RBC QN AUTO: 31.9 PG (ref 26.5–33)
MCHC RBC AUTO-ENTMCNC: 31.7 G/DL (ref 31.5–36.5)
MCV RBC AUTO: 101 FL (ref 78–100)
MONOCYTES # BLD AUTO: 0.4 10E3/UL (ref 0–1.3)
MONOCYTES NFR BLD AUTO: 12 %
NEUTROPHILS # BLD AUTO: 2 10E3/UL (ref 1.6–8.3)
NEUTROPHILS NFR BLD AUTO: 60 %
NRBC # BLD AUTO: 0 10E3/UL
NRBC BLD AUTO-RTO: 0 /100
PLATELET # BLD AUTO: 93 10E3/UL (ref 150–450)
POTASSIUM BLD-SCNC: 3.9 MMOL/L (ref 3.4–5.3)
PROT SERPL-MCNC: 6.3 G/DL (ref 6.8–8.8)
RBC # BLD AUTO: 3.13 10E6/UL (ref 3.8–5.2)
SODIUM SERPL-SCNC: 140 MMOL/L (ref 133–144)
WBC # BLD AUTO: 3.3 10E3/UL (ref 4–11)

## 2021-12-13 PROCEDURE — 99284 EMERGENCY DEPT VISIT MOD MDM: CPT | Performed by: EMERGENCY MEDICINE

## 2021-12-13 PROCEDURE — 85730 THROMBOPLASTIN TIME PARTIAL: CPT | Performed by: EMERGENCY MEDICINE

## 2021-12-13 PROCEDURE — 85025 COMPLETE CBC W/AUTO DIFF WBC: CPT | Performed by: EMERGENCY MEDICINE

## 2021-12-13 PROCEDURE — 36415 COLL VENOUS BLD VENIPUNCTURE: CPT | Performed by: EMERGENCY MEDICINE

## 2021-12-13 PROCEDURE — 99285 EMERGENCY DEPT VISIT HI MDM: CPT | Mod: 25 | Performed by: EMERGENCY MEDICINE

## 2021-12-13 PROCEDURE — 71275 CT ANGIOGRAPHY CHEST: CPT | Mod: 26 | Performed by: RADIOLOGY

## 2021-12-13 PROCEDURE — 93971 EXTREMITY STUDY: CPT | Mod: RT

## 2021-12-13 PROCEDURE — 85610 PROTHROMBIN TIME: CPT | Performed by: EMERGENCY MEDICINE

## 2021-12-13 PROCEDURE — 250N000011 HC RX IP 250 OP 636: Performed by: STUDENT IN AN ORGANIZED HEALTH CARE EDUCATION/TRAINING PROGRAM

## 2021-12-13 PROCEDURE — 250N000013 HC RX MED GY IP 250 OP 250 PS 637: Performed by: EMERGENCY MEDICINE

## 2021-12-13 PROCEDURE — 84703 CHORIONIC GONADOTROPIN ASSAY: CPT | Performed by: EMERGENCY MEDICINE

## 2021-12-13 PROCEDURE — 999N000248 HC STATISTIC IV INSERT WITH US BY RN

## 2021-12-13 PROCEDURE — 71275 CT ANGIOGRAPHY CHEST: CPT

## 2021-12-13 PROCEDURE — 80053 COMPREHEN METABOLIC PANEL: CPT | Performed by: EMERGENCY MEDICINE

## 2021-12-13 PROCEDURE — 93971 EXTREMITY STUDY: CPT | Mod: 26 | Performed by: RADIOLOGY

## 2021-12-13 RX ORDER — IOPAMIDOL 755 MG/ML
57 INJECTION, SOLUTION INTRAVASCULAR ONCE
Status: COMPLETED | OUTPATIENT
Start: 2021-12-13 | End: 2021-12-13

## 2021-12-13 RX ORDER — OXYCODONE HYDROCHLORIDE 5 MG/1
5 TABLET ORAL EVERY 6 HOURS PRN
Qty: 12 TABLET | Refills: 0 | Status: ON HOLD | OUTPATIENT
Start: 2021-12-13 | End: 2021-12-28

## 2021-12-13 RX ORDER — OXYCODONE HYDROCHLORIDE 5 MG/1
5 TABLET ORAL ONCE
Status: COMPLETED | OUTPATIENT
Start: 2021-12-13 | End: 2021-12-13

## 2021-12-13 RX ADMIN — OXYCODONE HYDROCHLORIDE 5 MG: 5 TABLET ORAL at 13:52

## 2021-12-13 RX ADMIN — IOPAMIDOL 57 ML: 755 INJECTION, SOLUTION INTRAVENOUS at 14:37

## 2021-12-13 ASSESSMENT — MIFFLIN-ST. JEOR: SCORE: 1358.43

## 2021-12-13 ASSESSMENT — ENCOUNTER SYMPTOMS
SHORTNESS OF BREATH: 0
COUGH: 0
COLOR CHANGE: 1
VOMITING: 0

## 2021-12-13 NOTE — ED TRIAGE NOTES
51 F - presenting to ED for eval of possibly worsening DVT in RUE:  Increasing pain, swelling to extremity.  On Elequis.

## 2021-12-13 NOTE — ED PROVIDER NOTES
"    [place] EMERGENCY DEPARTMENT ([facility])  12/13/21  History     Chief Complaint   Patient presents with     Deep Vein Thrombosis     RUE     Hemoptysis     The history is provided by the patient and medical records.     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 Patient reports this RUE pain started after the insertion of a PICC line while hospitalized at [facility]. She was subsequently admitted here at [facility] where she was diagnosed with a nonocclusive RUE DVT thought to be 2/2 the known PICC. This was subsequently removed on 12/7 and patient was initiated on apixaban.  Patient reports improvement in the pain after the PICC line was removed, however, noted return of her pain on Saturday (12/11).  She reports that there has also been significant bruising to the right bicep that extends down into the right forearm.  Patient subsequently went to an outside urgent care on 12/11 where she had a repeat ultrasound done which showed a partially occlusive DVT again seen in the right axillary and of the 2 right brachial arteries.  Patient denies any recent falls or trauma to the right arm.  She reports the pain has also been worsening since 12/11.  She also reports waking up this morning and had a \"mouthful of blood\" and is unsure of where the source of bleeding was.  She has not noted any bleeding since this morning.  Patient otherwise denies any chest pain.  No cough, shortness of breath, or other URI symptoms.  No abdominal pain or vomiting.    Per review of the patient's medical record, she was recently admitted " here to [facility] from 12/6/2021-12/9/2021.  On arrival, patient was noted to have had RUE swelling after PICC line placement.  Patient underwent ultrasound was notable for RUE DVT.  Patient was initiated on therapeutic Lovenox, subsequently transitioned to apixaban.  PICC line and catheter were removed prior to discharge on 12/9.    Past Medical History  Past Medical History:   Diagnosis Date     Anemia      Anxiety      Cold sore      Depressive disorder      Diabetes (H)     Type 2 DM/No Insulin      Encephalopathy      Esophageal varices without bleeding (H)     grade I     History of blood transfusion     10/2019     History of seizure     diabetic seizure     Insomnia      Liver cirrhosis secondary to CUETO (H) 05/28/2020     Migraine      Pleural effusion      Thrombocytopenia (H)      Thyroid disease      Past Surgical History:   Procedure Laterality Date     APPENDECTOMY      1989     COLONOSCOPY      1/2020 at [facility]      ENT SURGERY  1998    tempanoplasty     GI SURGERY      EGD August 2020 at [facility]      GYN SURGERY  2010    laparoscopic ablation     IR TRANSVEN INTRAHEPATIC PORTOSYST SHUNT  11/5/2021     MAMMOPLASTY REDUCTION BILATERAL  2004     OR STAB PHELBECTOMY VARICOSE VEINS, LESS THAN 10 INCISIONS, ONE EXTREMITY  2020     RNY gastric bypass  01/2006    retoocolic, retrogastric, [facility]     TONSILLECTOMY & ADENOIDECTOMY Bilateral 1978     WISDOM TEETH EXTRACTION       oxyCODONE (ROXICODONE) 5 MG tablet  acetaminophen (TYLENOL) 500 MG tablet  acetone urine (KETOSTIX) test strip  albuterol (PROAIR HFA/PROVENTIL HFA/VENTOLIN HFA) 108 (90 Base) MCG/ACT inhaler  amylase-lipase-protease (CREON 6) 7522-08969-73547 units CPEP  Apixaban Starter Pack (ELIQUIS DVT/PE STARTER PACK) 5 MG TBPK  Apixaban Starter Pack (ELIQUIS DVT/PE STARTER PACK) 5 MG TBPK  atorvastatin (LIPITOR) 20 MG tablet  calcium carbonate (TUMS) 500 MG chewable tablet  calcium citrate-vitamin  D (CITRACAL W/D) 250-100 MG-UNIT tablet  Continuous Blood Gluc  (DEXCOM G6 ) LUNA  Continuous Blood Gluc Sensor (DEXCOM G6 SENSOR) MISC  Continuous Blood Gluc Transmit (DEXCOM G6 TRANSMITTER) MISC  cyanocobalamin (VITAMIN B-12) 1000 MCG tablet  Ferrous Sulfate (IRON) 325 (65 FE) MG tablet  folic acid (FOLVITE) 1 MG tablet  furosemide (LASIX) 40 MG tablet  hydrOXYzine (ATARAX) 25 MG tablet  insulin aspart (NOVOLOG FLEXPEN) 100 UNIT/ML pen  insulin glargine (LANTUS PEN) 100 UNIT/ML pen  insulin pen needle (BD GRISEL U/F) 32G X 4 MM miscellaneous  lactulose (CHRONULAC) 10 GM/15ML solution  levothyroxine (SYNTHROID/LEVOTHROID) 200 MCG tablet  metoclopramide (REGLAN) 10 MG tablet  Multiple Vitamin (MULTIVITAMIN PO)  multivitamin (DEKAS ESSENTIAL) capsule  omeprazole (PRILOSEC) 20 MG DR capsule  ondansetron (ZOFRAN-ODT) 4 MG ODT tab  polyethylene glycol (MIRALAX) 17 g packet  prochlorperazine (COMPAZINE) 10 MG tablet  rifampin (RIFADIN) 300 MG capsule  rifaximin (XIFAXAN) 550 MG TABS tablet  Rimegepant Sulfate 75 MG TBDP  simethicone (MYLICON) 80 MG chewable tablet  sitagliptin (JANUVIA) 100 MG tablet  spironolactone (ALDACTONE) 100 MG tablet  SUMAtriptan (IMITREX) 50 MG tablet  topiramate (TOPAMAX) 50 MG tablet  traZODone (DESYREL) 100 MG tablet  valACYclovir (VALTREX) 1000 mg tablet  venlafaxine (EFFEXOR) 75 MG tablet  vitamin A 3 MG (43424 UNITS) capsule  vitamin C (ASCORBIC ACID) 1000 MG TABS  vitamin D3 (CHOLECALCIFEROL) 50 mcg (2000 units) tablet  vitamin D3 (CHOLECALCIFEROL) 50 mcg (2000 units) tablet  vitamin E (TOCOPHEROL) 400 units (180 mg) capsule      Allergies   Allergen Reactions     Methylprednisolone Hives     Droperidol Anxiety     Nsaids Other (See Comments)     GI bleed.     Prednisone Anxiety, Hives and Rash     Past medical history, past surgical history, medications, and allergies were reviewed with the patient. Additional pertinent items: None    Family History  Family History  "  Problem Relation Age of Onset     Anesthesia Reaction Nephew         PONV     Bleeding Disorder No family hx of      Clotting Disorder No family hx of      Family history was reviewed with the patient. Additional pertinent items: None    Social History  Social History     Tobacco Use     Smoking status: Never Smoker     Smokeless tobacco: Never Used   Substance Use Topics     Alcohol use: Not Currently     Comment: Last drink was in 2017     Drug use: Never      Social history was reviewed with the patient. Additional pertinent items: None      Review of Systems   Respiratory: Negative for cough and shortness of breath.    Cardiovascular: Negative for chest pain.   Gastrointestinal: Negative for vomiting.   Musculoskeletal:        RUE pain, worse in R-forearm   Skin: Positive for color change (Bruising to the RUE).   All other systems reviewed and are negative.      Physical Exam   BP: 123/80  Pulse: 78  Temp: 98.7  F (37.1  C)  Resp: 16  Height: 167.6 cm (5' 6\")  Weight: 71.7 kg (158 lb)  SpO2: 100 %  Physical Exam  Vitals and nursing note reviewed.   Constitutional:       General: She is not in acute distress.     Appearance: She is not diaphoretic.   HENT:      Head: Normocephalic.      Mouth/Throat:      Pharynx: No oropharyngeal exudate.   Eyes:      Extraocular Movements: Extraocular movements intact.   Cardiovascular:      Rate and Rhythm: Normal rate and regular rhythm.      Pulses: Normal pulses.      Heart sounds: Normal heart sounds.   Pulmonary:      Effort: No respiratory distress.      Breath sounds: Normal breath sounds.   Abdominal:      General: There is no distension.      Palpations: Abdomen is soft.      Tenderness: There is no abdominal tenderness.   Musculoskeletal:         General: No deformity.      Cervical back: Neck supple.      Comments: Tenderness to right forearm, extensive contusions of right lower arm   Skin:     General: Skin is warm and dry.   Neurological:      Mental Status: She " is alert.      Comments: alert   Psychiatric:         Behavior: Behavior normal.         ED Course   1:17 PM  The patient was seen and examined by Wm Horner DO in Room ED16.     Procedures       Results for orders placed or performed during the hospital encounter of 12/13/21   CT Chest Pulmonary Embolism w Contrast     Status: None    Narrative    EXAMINATION: CT CHEST PULMONARY EMBOLISM W CONTRAST, 12/13/2021 2:49  PM    CLINICAL HISTORY: PE suspected, high prob    COMPARISON: 12/3/2021.    TECHNIQUE: CT imaging obtained through the chest with contrast.  Coronal and axial MIP reformatted images obtained. Three-dimensional  (3D) post-processed angiographic images were reconstructed, archived  to PACS and used in interpretation of this study.     Contrast: iopamidol (ISOVUE-370) solution 57 mL    FINDINGS:    Lines and tubes: None.    Vessels: No central filling defect consistent with a pulmonary  embolism.  No aortic aneurysm.     Mediastinum: No thyroid nodules. Central tracheobronchial tree is  patent. Heart size is normal. No pericardial effusion.  Normal  thoracic vasculature. No thoracic lymphadenopathy.     Lungs: Scattered peripheral groundglass infiltrates and micronodules,  predominantly of the right upper, right middle lobes and bilateral  lower lobes. No pleural effusion or pneumothorax.    Bones and soft tissues: No suspicious bone findings.     Upper abdomen: Limited. No acute abdominal pathology. TIPS in place.      Impression    IMPRESSION:   1. No central filling defect consistent with a pulmonary embolism.   2. Scattered groundglass and micronodular infiltrates suggestive of  infectious or inflammatory etiology such as respiratory bronchiolitis.    I have personally reviewed the examination and initial interpretation  and I agree with the findings.    KHAI LAUREN MD         SYSTEM ID:  [id number]   US Upper Extremity Venous Duplex Right     Status: None    Narrative    EXAMINATION:  DOPPLER VENOUS ULTRASOUND OF THE RIGHT UPPER EXTREMITY,  12/13/2021 2:33 PM     COMPARISON: 12/6/2021    HISTORY: Right upper extremity pain, known DVT, worsening swelling    TECHNIQUE:  Gray-scale evaluation with compression, spectral flow and  color Doppler assessment of the deep venous system of the right upper  extremity.    FINDINGS:  Right: Normal blood flow and waveforms are demonstrated in the  internal jugular, innominate, subclavian, and axillary veins. The  brachial vein upper arm is fully compressible. 1 of the paired  brachial veins in the mid arm is noncompressible, and becomes fully  compressible in the antecubital fossa. The right basilic and cephalic  veins are fully compressible. The radial and ulnar veins are fully  compressible.       Impression    IMPRESSION:  Overall decreased extent of DVT, which is now only present in one of  the paired mid to distal brachial veins (previously was seen extending  into the axillary vein), however, this is now occlusive thrombus,  suggesting an acute component.     I have personally reviewed the examination and initial interpretation  and I agree with the findings.    CARLEY SCHUSTER MD         SYSTEM ID:  [id number]   Comprehensive metabolic panel     Status: Abnormal   Result Value Ref Range    Sodium 140 133 - 144 mmol/L    Potassium 3.9 3.4 - 5.3 mmol/L    Chloride 108 94 - 109 mmol/L    Carbon Dioxide (CO2) 27 20 - 32 mmol/L    Anion Gap 5 3 - 14 mmol/L    Urea Nitrogen 10 7 - 30 mg/dL    Creatinine 0.73 0.52 - 1.04 mg/dL    Calcium 8.6 8.5 - 10.1 mg/dL    Glucose 177 (H) 70 - 99 mg/dL    Alkaline Phosphatase 124 40 - 150 U/L    AST 38 0 - 45 U/L    ALT 19 0 - 50 U/L    Protein Total 6.3 (L) 6.8 - 8.8 g/dL    Albumin 3.2 (L) 3.4 - 5.0 g/dL    Bilirubin Total 2.2 (H) 0.2 - 1.3 mg/dL    GFR Estimate >90 >60 mL/min/1.73m2   HCG qualitative Blood     Status: Normal   Result Value Ref Range    hCG Serum Qualitative Negative Negative   INR     Status: Abnormal    Result Value Ref Range    INR 2.54 (H) 0.85 - 1.15   Partial thromboplastin time     Status: Abnormal   Result Value Ref Range    aPTT 47 (H) 22 - 38 Seconds   CBC with platelets and differential     Status: Abnormal   Result Value Ref Range    WBC Count 3.3 (L) 4.0 - 11.0 10e3/uL    RBC Count 3.13 (L) 3.80 - 5.20 10e6/uL    Hemoglobin 10.0 (L) 11.7 - 15.7 g/dL    Hematocrit 31.5 (L) 35.0 - 47.0 %     (H) 78 - 100 fL    MCH 31.9 26.5 - 33.0 pg    MCHC 31.7 31.5 - 36.5 g/dL    RDW 19.5 (H) 10.0 - 15.0 %    Platelet Count 93 (L) 150 - 450 10e3/uL    % Neutrophils 60 %    % Lymphocytes 22 %    % Monocytes 12 %    % Eosinophils 4 %    % Basophils 1 %    % Immature Granulocytes 1 %    NRBCs per 100 WBC 0 <1 /100    Absolute Neutrophils 2.0 1.6 - 8.3 10e3/uL    Absolute Lymphocytes 0.7 (L) 0.8 - 5.3 10e3/uL    Absolute Monocytes 0.4 0.0 - 1.3 10e3/uL    Absolute Eosinophils 0.1 0.0 - 0.7 10e3/uL    Absolute Basophils 0.0 0.0 - 0.2 10e3/uL    Absolute Immature Granulocytes 0.0 <=0.4 10e3/uL    Absolute NRBCs 0.0 10e3/uL   CBC with platelets differential     Status: Abnormal    Narrative    The following orders were created for panel order CBC with platelets differential.  Procedure                               Abnormality         Status                     ---------                               -----------         ------                     CBC with platelets and d...[[id number]]  Abnormal            Final result                 Please view results for these tests on the individual orders.     Medications   oxyCODONE (ROXICODONE) tablet 5 mg (5 mg Oral Given 12/13/21 1352)   iopamidol (ISOVUE-370) solution 57 mL (57 mLs Intravenous Given 12/13/21 1437)   sodium chloride (PF) 0.9% PF flush 88 mL (90 mLs Intravenous Given 12/13/21 1436)       Results for orders placed or performed during the hospital encounter of 12/13/21   CT Chest Pulmonary Embolism w Contrast     Status: None    Narrative    EXAMINATION: CT  CHEST PULMONARY EMBOLISM W CONTRAST, 12/13/2021 2:49  PM    CLINICAL HISTORY: PE suspected, high prob    COMPARISON: 12/3/2021.    TECHNIQUE: CT imaging obtained through the chest with contrast.  Coronal and axial MIP reformatted images obtained. Three-dimensional  (3D) post-processed angiographic images were reconstructed, archived  to PACS and used in interpretation of this study.     Contrast: iopamidol (ISOVUE-370) solution 57 mL    FINDINGS:    Lines and tubes: None.    Vessels: No central filling defect consistent with a pulmonary  embolism.  No aortic aneurysm.     Mediastinum: No thyroid nodules. Central tracheobronchial tree is  patent. Heart size is normal. No pericardial effusion.  Normal  thoracic vasculature. No thoracic lymphadenopathy.     Lungs: Scattered peripheral groundglass infiltrates and micronodules,  predominantly of the right upper, right middle lobes and bilateral  lower lobes. No pleural effusion or pneumothorax.    Bones and soft tissues: No suspicious bone findings.     Upper abdomen: Limited. No acute abdominal pathology. TIPS in place.      Impression    IMPRESSION:   1. No central filling defect consistent with a pulmonary embolism.   2. Scattered groundglass and micronodular infiltrates suggestive of  infectious or inflammatory etiology such as respiratory bronchiolitis.    I have personally reviewed the examination and initial interpretation  and I agree with the findings.    KHAI LAUREN MD         SYSTEM ID:  [id number]   US Upper Extremity Venous Duplex Right     Status: None    Narrative    EXAMINATION: DOPPLER VENOUS ULTRASOUND OF THE RIGHT UPPER EXTREMITY,  12/13/2021 2:33 PM     COMPARISON: 12/6/2021    HISTORY: Right upper extremity pain, known DVT, worsening swelling    TECHNIQUE:  Gray-scale evaluation with compression, spectral flow and  color Doppler assessment of the deep venous system of the right upper  extremity.    FINDINGS:  Right: Normal blood flow and  waveforms are demonstrated in the  internal jugular, innominate, subclavian, and axillary veins. The  brachial vein upper arm is fully compressible. 1 of the paired  brachial veins in the mid arm is noncompressible, and becomes fully  compressible in the antecubital fossa. The right basilic and cephalic  veins are fully compressible. The radial and ulnar veins are fully  compressible.       Impression    IMPRESSION:  Overall decreased extent of DVT, which is now only present in one of  the paired mid to distal brachial veins (previously was seen extending  into the axillary vein), however, this is now occlusive thrombus,  suggesting an acute component.     I have personally reviewed the examination and initial interpretation  and I agree with the findings.    CARLEY SCHUSTER MD         SYSTEM ID:  [id number]   Comprehensive metabolic panel     Status: Abnormal   Result Value Ref Range    Sodium 140 133 - 144 mmol/L    Potassium 3.9 3.4 - 5.3 mmol/L    Chloride 108 94 - 109 mmol/L    Carbon Dioxide (CO2) 27 20 - 32 mmol/L    Anion Gap 5 3 - 14 mmol/L    Urea Nitrogen 10 7 - 30 mg/dL    Creatinine 0.73 0.52 - 1.04 mg/dL    Calcium 8.6 8.5 - 10.1 mg/dL    Glucose 177 (H) 70 - 99 mg/dL    Alkaline Phosphatase 124 40 - 150 U/L    AST 38 0 - 45 U/L    ALT 19 0 - 50 U/L    Protein Total 6.3 (L) 6.8 - 8.8 g/dL    Albumin 3.2 (L) 3.4 - 5.0 g/dL    Bilirubin Total 2.2 (H) 0.2 - 1.3 mg/dL    GFR Estimate >90 >60 mL/min/1.73m2   HCG qualitative Blood     Status: Normal   Result Value Ref Range    hCG Serum Qualitative Negative Negative   INR     Status: Abnormal   Result Value Ref Range    INR 2.54 (H) 0.85 - 1.15   Partial thromboplastin time     Status: Abnormal   Result Value Ref Range    aPTT 47 (H) 22 - 38 Seconds   CBC with platelets and differential     Status: Abnormal   Result Value Ref Range    WBC Count 3.3 (L) 4.0 - 11.0 10e3/uL    RBC Count 3.13 (L) 3.80 - 5.20 10e6/uL    Hemoglobin 10.0 (L) 11.7 - 15.7 g/dL     Hematocrit 31.5 (L) 35.0 - 47.0 %     (H) 78 - 100 fL    MCH 31.9 26.5 - 33.0 pg    MCHC 31.7 31.5 - 36.5 g/dL    RDW 19.5 (H) 10.0 - 15.0 %    Platelet Count 93 (L) 150 - 450 10e3/uL    % Neutrophils 60 %    % Lymphocytes 22 %    % Monocytes 12 %    % Eosinophils 4 %    % Basophils 1 %    % Immature Granulocytes 1 %    NRBCs per 100 WBC 0 <1 /100    Absolute Neutrophils 2.0 1.6 - 8.3 10e3/uL    Absolute Lymphocytes 0.7 (L) 0.8 - 5.3 10e3/uL    Absolute Monocytes 0.4 0.0 - 1.3 10e3/uL    Absolute Eosinophils 0.1 0.0 - 0.7 10e3/uL    Absolute Basophils 0.0 0.0 - 0.2 10e3/uL    Absolute Immature Granulocytes 0.0 <=0.4 10e3/uL    Absolute NRBCs 0.0 10e3/uL   CBC with platelets differential     Status: Abnormal    Narrative    The following orders were created for panel order CBC with platelets differential.  Procedure                               Abnormality         Status                     ---------                               -----------         ------                     CBC with platelets and d...[[id number]]  Abnormal            Final result                 Please view results for these tests on the individual orders.          Assessments & Plan (with Medical Decision Making)   51-year-old female presents to us with a chief complaint of right arm pain and contusions and possible hemoptysis.  She has been taking her anticoagulants.  There is extensive bruising however no evidence of cellulitis.  Repeat ultrasound to assess for progression of the upper extremity no DVT was ordered.  As to the blood she noted in her mouth she does not actually remember coughing.  Is unclear if this represents hemoptysis or possibly a gingival bleed.  CT scan of her chest was ordered.  This showed no evidence of pulmonary embolism.  Repeat ultrasound of her right extremity showed overall decrease in the size of her DVT.  Suspect her pain and swelling is due to the large contusion in her right upper extremity.  She was on  blood thinners at which point her PICC line was removed.  This likely led to a decent amount of internal bleeding which does seem to have stopped.  I expect this because the swelling increase in the very large area of ecchymosis that she is concerned about.  We will give her a short course of oxycodone to help with her pain.  This did improve her pain greatly here in the emergency department.  In terms of her stated multiple blood from waking up overnight I suspect she had a small amount of gingival bleeding overnight.  I do not think this is likely hemoptysis given the lack of pulmonary embolism on CT scan and the lack of further coughing or symptoms  today..  She will follow-up with primary care and return to us as needed I have reviewed the nursing notes. I have reviewed the findings, diagnosis, plan and need for follow up with the patient.    Discharge Medication List as of 12/13/2021  3:42 PM      START taking these medications    Details   oxyCODONE (ROXICODONE) 5 MG tablet Take 1 tablet (5 mg) by mouth every 6 hours as needed for pain, Disp-12 tablet, R-0, Local Print             Final diagnoses:   Acute deep vein thrombosis (DVT) of brachial vein of right upper extremity (H)   Right upper extremity contusion    I, Dalton Schmid, am serving as a trained medical scribe to document services personally performed by  Wm Horner DO, based on the provider's statements to me.    IWm DO, was physically present and have reviewed and verified the accuracy of this note documented by Dalton Schmid.   --    [facility] EMERGENCY DEPARTMENT  12/13/2021     Wm Horner DO  12/14/21 1943

## 2021-12-13 NOTE — PROGRESS NOTES
Hepatology post hospital discharge RNCC assessment    Post hospital discharge follow up:      1. Admission diagnosis:  Recurrent hepatic hydrothorax  2. Discharge diagnosis: Hepatic encephalopathy, decompensated CUETO cirrhosis s/p TIPS 11/5/21, recurrent hepatic hydrothorax, and nonocclusive DVT of right upper extremity likely PICC associated  3. Admitted on: 12/6/21  4. Discharged on:  12/9/21    Medication Review    1. Medication review completed.    2. Discharge medication changes reviewed.   3. Patient did have new medications prescribed in hospital.  - Apixaban, Take 10 mg by mouth 2 times daily for 7 days, THEN 5 mg 2 times daily for 23 days   - Furosemide 40 mg tablet, Take 1 tablet by mouth daily  - Spironolactone 100 mg tablet, Take 1 tablet by mouth daily      Follow Up Post Discharge    1. Reviewed discharge instructions with patient. Follow up appointments reviewed and transportation to appointments confirmed.   2. Patient able to verbalized understanding of discharge instructions including medications and follow up.      3. Care coordinator role and contact information reviewed, and pt encouraged to call with questions or concerns.     Pt is currently in the ER with c/o increased upper extremity edema and bleeding from mouth and is not stopping with pressure. Plan to follow plan of care and will reach out to pt once discharged.

## 2021-12-14 NOTE — PROGRESS NOTES
Pt discharged from the ER on 12/13. Ultrasound of right upper extremity showed overall decreased extent of DVT. Pt also had a CT of chest which was negative for PE.     Called pt to review symptoms and discharge plan.    Symptom Review    1.  Ascites:  Pt reported increased abdominal distension but is unsure if it is ascites. CT of abdomen on 12/3 showed no ascites.   2.  Edema: Pt reported increased non pitting lower extremity edema. Pt has been compliant with lasix 40 mg daily and spironolactone 100 mg daily. Instructed pt to elevate legs throughout the day and adhere to low Na diet.   3. Hepatic hydrothorax: Denied shortness of breath, cough, and chest pain. CT of chest on 12/13 showed no pleural effusion.  4.  Encephalopathy:  Pt denied confusion but endorsed increase lethargy throughout the day. Pt stated that she will fall asleep mid sentence. Pt is taking lactulose 30 ml 3 times daily and having at least 3 BMs/day. Discussed increasing lactulose dose to see if this helps with lethargy. Educated pt to titrate lactulose to achieve 3-5 BMs/day.      Plan    1. Will check in with pt in 2-3 days.  2. Hepatology follow up with Dr. Cook on 1/6.      Patient was given an opportunity to ask questions and have those questions answered to her satisfaction.  Patient verbalized understanding of instructions provided and agreed to plan of care.

## 2021-12-18 ENCOUNTER — HEALTH MAINTENANCE LETTER (OUTPATIENT)
Age: 51
End: 2021-12-18

## 2021-12-20 NOTE — PROGRESS NOTES
Pt continues to have increased fatigue and lethargy. Denied confusion and is having at least 4 BMs/day with current lactulose regimen. Pt reported decreased appetite and intermittent nausea. Denied hematemesis, hematochezia, and melena. Pt endorsed right arm pain and is taking oxycodone 5 mg as needed (typically 1 tab in the am and 1 tab in the pm). Discussed that arm pain should be improving with decreased extent of blood clot. Educated that pain medications can cause constipation and encouraged pt to minimize use of opioids.     Pt has hepatology follow up with Dr. Cook on 1/6/2022. Pt will get labs done locally at primary care clinic. Lab orders faxed.

## 2021-12-21 ENCOUNTER — TELEPHONE (OUTPATIENT)
Dept: TRANSPLANT | Facility: CLINIC | Age: 51
End: 2021-12-21
Payer: COMMERCIAL

## 2021-12-21 NOTE — TELEPHONE ENCOUNTER
Spoke with lab and reviewed that orders for BMP, CBC, INR, and hepatic panel are future orders and have an expected date on 1/6/22. Lab staff verbalized understanding and will call with any additional questions regarding orders.

## 2021-12-21 NOTE — TELEPHONE ENCOUNTER
All from lab  They received orders from Dr Cook  Hepatic Panel= BMP-CBC and INR  There was no duration  Or Discontinue date on the orders  Needs another set faxed to them

## 2021-12-26 ENCOUNTER — APPOINTMENT (OUTPATIENT)
Dept: ULTRASOUND IMAGING | Facility: CLINIC | Age: 51
DRG: 442 | End: 2021-12-26
Attending: PHYSICIAN ASSISTANT
Payer: COMMERCIAL

## 2021-12-26 ENCOUNTER — HOSPITAL ENCOUNTER (INPATIENT)
Facility: CLINIC | Age: 51
LOS: 2 days | Discharge: HOME HEALTH | DRG: 442 | End: 2021-12-28
Attending: EMERGENCY MEDICINE | Admitting: STUDENT IN AN ORGANIZED HEALTH CARE EDUCATION/TRAINING PROGRAM
Payer: COMMERCIAL

## 2021-12-26 ENCOUNTER — APPOINTMENT (OUTPATIENT)
Dept: GENERAL RADIOLOGY | Facility: CLINIC | Age: 51
DRG: 442 | End: 2021-12-26
Attending: PHYSICIAN ASSISTANT
Payer: COMMERCIAL

## 2021-12-26 ENCOUNTER — APPOINTMENT (OUTPATIENT)
Dept: CT IMAGING | Facility: CLINIC | Age: 51
DRG: 442 | End: 2021-12-26
Attending: EMERGENCY MEDICINE
Payer: COMMERCIAL

## 2021-12-26 DIAGNOSIS — E11.9 TYPE 2 DIABETES MELLITUS WITHOUT COMPLICATION, WITHOUT LONG-TERM CURRENT USE OF INSULIN (H): ICD-10-CM

## 2021-12-26 DIAGNOSIS — Z20.822 LAB TEST NEGATIVE FOR COVID-19 VIRUS: ICD-10-CM

## 2021-12-26 DIAGNOSIS — K74.60 DECOMPENSATED HEPATIC CIRRHOSIS (H): Primary | ICD-10-CM

## 2021-12-26 DIAGNOSIS — K76.82 HEPATIC ENCEPHALOPATHY (H): ICD-10-CM

## 2021-12-26 DIAGNOSIS — R41.82 ALTERED MENTAL STATUS, UNSPECIFIED ALTERED MENTAL STATUS TYPE: ICD-10-CM

## 2021-12-26 DIAGNOSIS — K75.81 NASH (NONALCOHOLIC STEATOHEPATITIS): ICD-10-CM

## 2021-12-26 DIAGNOSIS — R73.9 HYPERGLYCEMIA: ICD-10-CM

## 2021-12-26 DIAGNOSIS — K74.60 CIRRHOSIS OF LIVER WITHOUT ASCITES, UNSPECIFIED HEPATIC CIRRHOSIS TYPE (H): ICD-10-CM

## 2021-12-26 DIAGNOSIS — K72.90 DECOMPENSATED HEPATIC CIRRHOSIS (H): Primary | ICD-10-CM

## 2021-12-26 DIAGNOSIS — K74.60 NON-ALCOHOLIC CIRRHOSIS (H): ICD-10-CM

## 2021-12-26 LAB
ALBUMIN SERPL-MCNC: 3 G/DL (ref 3.4–5)
ALBUMIN UR-MCNC: NEGATIVE MG/DL
ALP SERPL-CCNC: 200 U/L (ref 40–150)
ALT SERPL W P-5'-P-CCNC: 32 U/L (ref 0–50)
AMMONIA PLAS-SCNC: 144 UMOL/L (ref 10–50)
ANION GAP SERPL CALCULATED.3IONS-SCNC: 7 MMOL/L (ref 3–14)
APPEARANCE UR: ABNORMAL
APTT PPP: 38 SECONDS (ref 22–38)
AST SERPL W P-5'-P-CCNC: 50 U/L (ref 0–45)
BASOPHILS # BLD AUTO: 0 10E3/UL (ref 0–0.2)
BASOPHILS NFR BLD AUTO: 1 %
BILIRUB SERPL-MCNC: 2.2 MG/DL (ref 0.2–1.3)
BILIRUB UR QL STRIP: NEGATIVE
BUN SERPL-MCNC: 13 MG/DL (ref 7–30)
C DIFF TOX B STL QL: NEGATIVE
CALCIUM SERPL-MCNC: 9.4 MG/DL (ref 8.5–10.1)
CHLORIDE BLD-SCNC: 113 MMOL/L (ref 94–109)
CO2 SERPL-SCNC: 22 MMOL/L (ref 20–32)
COLOR UR AUTO: YELLOW
CREAT SERPL-MCNC: 0.77 MG/DL (ref 0.52–1.04)
EOSINOPHIL # BLD AUTO: 0.1 10E3/UL (ref 0–0.7)
EOSINOPHIL NFR BLD AUTO: 3 %
ERYTHROCYTE [DISTWIDTH] IN BLOOD BY AUTOMATED COUNT: 19.8 % (ref 10–15)
GFR SERPL CREATININE-BSD FRML MDRD: >90 ML/MIN/1.73M2
GLUCOSE BLD-MCNC: 201 MG/DL (ref 70–99)
GLUCOSE BLDC GLUCOMTR-MCNC: 134 MG/DL (ref 70–99)
GLUCOSE BLDC GLUCOMTR-MCNC: 167 MG/DL (ref 70–99)
GLUCOSE BLDC GLUCOMTR-MCNC: 181 MG/DL (ref 70–99)
GLUCOSE BLDC GLUCOMTR-MCNC: 300 MG/DL (ref 70–99)
GLUCOSE UR STRIP-MCNC: NEGATIVE MG/DL
HBA1C MFR BLD: 5.4 % (ref 0–5.6)
HCT VFR BLD AUTO: 32.7 % (ref 35–47)
HGB BLD-MCNC: 10.8 G/DL (ref 11.7–15.7)
HGB UR QL STRIP: NEGATIVE
HOLD SPECIMEN: NORMAL
HYALINE CASTS: 4 /LPF
IMM GRANULOCYTES # BLD: 0 10E3/UL
IMM GRANULOCYTES NFR BLD: 0 %
INR PPP: 1.5 (ref 0.85–1.15)
KETONES UR STRIP-MCNC: ABNORMAL MG/DL
LACTATE SERPL-SCNC: 1.7 MMOL/L (ref 0.7–2)
LEUKOCYTE ESTERASE UR QL STRIP: NEGATIVE
LYMPHOCYTES # BLD AUTO: 0.7 10E3/UL (ref 0.8–5.3)
LYMPHOCYTES NFR BLD AUTO: 22 %
MAGNESIUM SERPL-MCNC: 2 MG/DL (ref 1.6–2.3)
MCH RBC QN AUTO: 32.2 PG (ref 26.5–33)
MCHC RBC AUTO-ENTMCNC: 33 G/DL (ref 31.5–36.5)
MCV RBC AUTO: 98 FL (ref 78–100)
MONOCYTES # BLD AUTO: 0.2 10E3/UL (ref 0–1.3)
MONOCYTES NFR BLD AUTO: 7 %
MUCOUS THREADS #/AREA URNS LPF: PRESENT /LPF
NEUTROPHILS # BLD AUTO: 2 10E3/UL (ref 1.6–8.3)
NEUTROPHILS NFR BLD AUTO: 67 %
NITRATE UR QL: NEGATIVE
NRBC # BLD AUTO: 0 10E3/UL
NRBC BLD AUTO-RTO: 0 /100
PH UR STRIP: 7 [PH] (ref 5–7)
PHOSPHATE SERPL-MCNC: 3.2 MG/DL (ref 2.5–4.5)
PLATELET # BLD AUTO: 106 10E3/UL (ref 150–450)
POTASSIUM BLD-SCNC: 3.9 MMOL/L (ref 3.4–5.3)
POTASSIUM BLD-SCNC: 4 MMOL/L (ref 3.4–5.3)
PROT SERPL-MCNC: 7.5 G/DL (ref 6.8–8.8)
RBC # BLD AUTO: 3.35 10E6/UL (ref 3.8–5.2)
RBC URINE: 1 /HPF
SARS-COV-2 RNA RESP QL NAA+PROBE: NEGATIVE
SODIUM SERPL-SCNC: 142 MMOL/L (ref 133–144)
SP GR UR STRIP: 1.02 (ref 1–1.03)
SQUAMOUS EPITHELIAL: 5 /HPF
T4 FREE SERPL-MCNC: 1.33 NG/DL (ref 0.76–1.46)
TROPONIN I SERPL HS-MCNC: 6 NG/L
TSH SERPL DL<=0.005 MIU/L-ACNC: 0.34 MU/L (ref 0.4–4)
UROBILINOGEN UR STRIP-MCNC: NORMAL MG/DL
WBC # BLD AUTO: 2.9 10E3/UL (ref 4–11)
WBC URINE: 1 /HPF

## 2021-12-26 PROCEDURE — 84484 ASSAY OF TROPONIN QUANT: CPT | Performed by: EMERGENCY MEDICINE

## 2021-12-26 PROCEDURE — 87493 C DIFF AMPLIFIED PROBE: CPT | Performed by: PHYSICIAN ASSISTANT

## 2021-12-26 PROCEDURE — 71045 X-RAY EXAM CHEST 1 VIEW: CPT | Mod: 26 | Performed by: STUDENT IN AN ORGANIZED HEALTH CARE EDUCATION/TRAINING PROGRAM

## 2021-12-26 PROCEDURE — C9803 HOPD COVID-19 SPEC COLLECT: HCPCS | Performed by: EMERGENCY MEDICINE

## 2021-12-26 PROCEDURE — 93010 ELECTROCARDIOGRAM REPORT: CPT | Performed by: INTERNAL MEDICINE

## 2021-12-26 PROCEDURE — 93970 EXTREMITY STUDY: CPT | Mod: 26 | Performed by: STUDENT IN AN ORGANIZED HEALTH CARE EDUCATION/TRAINING PROGRAM

## 2021-12-26 PROCEDURE — 36415 COLL VENOUS BLD VENIPUNCTURE: CPT | Performed by: PHYSICIAN ASSISTANT

## 2021-12-26 PROCEDURE — 99285 EMERGENCY DEPT VISIT HI MDM: CPT | Mod: 25 | Performed by: EMERGENCY MEDICINE

## 2021-12-26 PROCEDURE — 93971 EXTREMITY STUDY: CPT | Mod: RT

## 2021-12-26 PROCEDURE — 85025 COMPLETE CBC W/AUTO DIFF WBC: CPT | Performed by: EMERGENCY MEDICINE

## 2021-12-26 PROCEDURE — 84443 ASSAY THYROID STIM HORMONE: CPT | Performed by: PHYSICIAN ASSISTANT

## 2021-12-26 PROCEDURE — 250N000013 HC RX MED GY IP 250 OP 250 PS 637: Performed by: STUDENT IN AN ORGANIZED HEALTH CARE EDUCATION/TRAINING PROGRAM

## 2021-12-26 PROCEDURE — 93970 EXTREMITY STUDY: CPT

## 2021-12-26 PROCEDURE — 99222 1ST HOSP IP/OBS MODERATE 55: CPT | Mod: GC | Performed by: INTERNAL MEDICINE

## 2021-12-26 PROCEDURE — 99285 EMERGENCY DEPT VISIT HI MDM: CPT | Performed by: EMERGENCY MEDICINE

## 2021-12-26 PROCEDURE — 84132 ASSAY OF SERUM POTASSIUM: CPT | Performed by: PHYSICIAN ASSISTANT

## 2021-12-26 PROCEDURE — 250N000011 HC RX IP 250 OP 636

## 2021-12-26 PROCEDURE — 99207 PR APP CREDIT; MD BILLING SHARED VISIT: CPT | Performed by: PHYSICIAN ASSISTANT

## 2021-12-26 PROCEDURE — 93005 ELECTROCARDIOGRAM TRACING: CPT

## 2021-12-26 PROCEDURE — 71045 X-RAY EXAM CHEST 1 VIEW: CPT

## 2021-12-26 PROCEDURE — 82140 ASSAY OF AMMONIA: CPT | Performed by: EMERGENCY MEDICINE

## 2021-12-26 PROCEDURE — 87086 URINE CULTURE/COLONY COUNT: CPT | Performed by: PHYSICIAN ASSISTANT

## 2021-12-26 PROCEDURE — 83735 ASSAY OF MAGNESIUM: CPT | Performed by: PHYSICIAN ASSISTANT

## 2021-12-26 PROCEDURE — 96372 THER/PROPH/DIAG INJ SC/IM: CPT

## 2021-12-26 PROCEDURE — 83036 HEMOGLOBIN GLYCOSYLATED A1C: CPT | Performed by: PHYSICIAN ASSISTANT

## 2021-12-26 PROCEDURE — 36415 COLL VENOUS BLD VENIPUNCTURE: CPT | Performed by: EMERGENCY MEDICINE

## 2021-12-26 PROCEDURE — 84439 ASSAY OF FREE THYROXINE: CPT | Performed by: PHYSICIAN ASSISTANT

## 2021-12-26 PROCEDURE — 85610 PROTHROMBIN TIME: CPT | Performed by: EMERGENCY MEDICINE

## 2021-12-26 PROCEDURE — 83605 ASSAY OF LACTIC ACID: CPT | Performed by: EMERGENCY MEDICINE

## 2021-12-26 PROCEDURE — 93971 EXTREMITY STUDY: CPT | Mod: 26 | Performed by: STUDENT IN AN ORGANIZED HEALTH CARE EDUCATION/TRAINING PROGRAM

## 2021-12-26 PROCEDURE — 84100 ASSAY OF PHOSPHORUS: CPT | Performed by: PHYSICIAN ASSISTANT

## 2021-12-26 PROCEDURE — 85730 THROMBOPLASTIN TIME PARTIAL: CPT | Performed by: EMERGENCY MEDICINE

## 2021-12-26 PROCEDURE — 80053 COMPREHEN METABOLIC PANEL: CPT | Performed by: EMERGENCY MEDICINE

## 2021-12-26 PROCEDURE — 99223 1ST HOSP IP/OBS HIGH 75: CPT | Mod: AI | Performed by: STUDENT IN AN ORGANIZED HEALTH CARE EDUCATION/TRAINING PROGRAM

## 2021-12-26 PROCEDURE — 70450 CT HEAD/BRAIN W/O DYE: CPT

## 2021-12-26 PROCEDURE — 70450 CT HEAD/BRAIN W/O DYE: CPT | Mod: 26 | Performed by: RADIOLOGY

## 2021-12-26 PROCEDURE — 120N000005 HC R&B MS OVERFLOW UMMC

## 2021-12-26 PROCEDURE — 250N000013 HC RX MED GY IP 250 OP 250 PS 637: Performed by: PHYSICIAN ASSISTANT

## 2021-12-26 PROCEDURE — U0005 INFEC AGEN DETEC AMPLI PROBE: HCPCS | Performed by: EMERGENCY MEDICINE

## 2021-12-26 PROCEDURE — 250N000013 HC RX MED GY IP 250 OP 250 PS 637: Performed by: EMERGENCY MEDICINE

## 2021-12-26 PROCEDURE — 81001 URINALYSIS AUTO W/SCOPE: CPT | Performed by: PHYSICIAN ASSISTANT

## 2021-12-26 RX ORDER — DEXTROSE MONOHYDRATE 25 G/50ML
25-50 INJECTION, SOLUTION INTRAVENOUS
Status: DISCONTINUED | OUTPATIENT
Start: 2021-12-26 | End: 2021-12-28 | Stop reason: HOSPADM

## 2021-12-26 RX ORDER — FERROUS SULFATE 325(65) MG
325 TABLET ORAL EVERY MORNING
Status: DISCONTINUED | OUTPATIENT
Start: 2021-12-26 | End: 2021-12-28 | Stop reason: HOSPADM

## 2021-12-26 RX ORDER — SUMATRIPTAN 50 MG/1
50 TABLET, FILM COATED ORAL
Status: DISCONTINUED | OUTPATIENT
Start: 2021-12-26 | End: 2021-12-28 | Stop reason: HOSPADM

## 2021-12-26 RX ORDER — CALCIUM CARBONATE 500 MG/1
500 TABLET, CHEWABLE ORAL DAILY PRN
Status: DISCONTINUED | OUTPATIENT
Start: 2021-12-26 | End: 2021-12-28 | Stop reason: HOSPADM

## 2021-12-26 RX ORDER — CHOLECALCIFEROL (VITAMIN D3) 125 MCG
10000 CAPSULE ORAL EVERY MORNING
Status: DISCONTINUED | OUTPATIENT
Start: 2021-12-26 | End: 2021-12-28 | Stop reason: HOSPADM

## 2021-12-26 RX ORDER — LACTULOSE 10 G/15ML
30 SOLUTION ORAL 3 TIMES DAILY
Status: DISCONTINUED | OUTPATIENT
Start: 2021-12-26 | End: 2021-12-28 | Stop reason: HOSPADM

## 2021-12-26 RX ORDER — FOLIC ACID 1 MG/1
1 TABLET ORAL EVERY MORNING
Status: DISCONTINUED | OUTPATIENT
Start: 2021-12-26 | End: 2021-12-28 | Stop reason: HOSPADM

## 2021-12-26 RX ORDER — TOPIRAMATE 50 MG/1
50 TABLET, FILM COATED ORAL DAILY
Status: DISCONTINUED | OUTPATIENT
Start: 2021-12-26 | End: 2021-12-28 | Stop reason: HOSPADM

## 2021-12-26 RX ORDER — THIAMINE HYDROCHLORIDE 100 MG/ML
100 INJECTION, SOLUTION INTRAMUSCULAR; INTRAVENOUS DAILY
Status: DISCONTINUED | OUTPATIENT
Start: 2021-12-26 | End: 2021-12-28 | Stop reason: HOSPADM

## 2021-12-26 RX ORDER — TRAZODONE HYDROCHLORIDE 50 MG/1
50-150 TABLET, FILM COATED ORAL
Status: DISCONTINUED | OUTPATIENT
Start: 2021-12-26 | End: 2021-12-28 | Stop reason: HOSPADM

## 2021-12-26 RX ORDER — ATORVASTATIN CALCIUM 20 MG/1
20 TABLET, FILM COATED ORAL EVERY MORNING
Status: DISCONTINUED | OUTPATIENT
Start: 2021-12-26 | End: 2021-12-28 | Stop reason: HOSPADM

## 2021-12-26 RX ORDER — VALACYCLOVIR HYDROCHLORIDE 500 MG/1
1000 TABLET, FILM COATED ORAL DAILY PRN
Status: DISCONTINUED | OUTPATIENT
Start: 2021-12-26 | End: 2021-12-28 | Stop reason: HOSPADM

## 2021-12-26 RX ORDER — OLANZAPINE 10 MG/2ML
INJECTION, POWDER, FOR SOLUTION INTRAMUSCULAR
Status: COMPLETED
Start: 2021-12-26 | End: 2021-12-26

## 2021-12-26 RX ORDER — SIMETHICONE 80 MG
80 TABLET,CHEWABLE ORAL EVERY 6 HOURS PRN
Status: DISCONTINUED | OUTPATIENT
Start: 2021-12-26 | End: 2021-12-28 | Stop reason: HOSPADM

## 2021-12-26 RX ORDER — PROCHLORPERAZINE MALEATE 5 MG
10 TABLET ORAL EVERY 6 HOURS PRN
Status: DISCONTINUED | OUTPATIENT
Start: 2021-12-26 | End: 2021-12-28 | Stop reason: HOSPADM

## 2021-12-26 RX ORDER — ACETAMINOPHEN 500 MG
500 TABLET ORAL EVERY 6 HOURS PRN
Status: DISCONTINUED | OUTPATIENT
Start: 2021-12-26 | End: 2021-12-28 | Stop reason: HOSPADM

## 2021-12-26 RX ORDER — POLYETHYLENE GLYCOL 3350 17 G/17G
17 POWDER, FOR SOLUTION ORAL DAILY PRN
Status: DISCONTINUED | OUTPATIENT
Start: 2021-12-26 | End: 2021-12-28 | Stop reason: HOSPADM

## 2021-12-26 RX ORDER — HYDROXYZINE HYDROCHLORIDE 25 MG/1
25 TABLET, FILM COATED ORAL 3 TIMES DAILY PRN
Status: DISCONTINUED | OUTPATIENT
Start: 2021-12-26 | End: 2021-12-28 | Stop reason: HOSPADM

## 2021-12-26 RX ORDER — ASCORBIC ACID 500 MG
1000 TABLET ORAL DAILY
Status: DISCONTINUED | OUTPATIENT
Start: 2021-12-26 | End: 2021-12-28 | Stop reason: HOSPADM

## 2021-12-26 RX ORDER — METOCLOPRAMIDE 10 MG/1
10 TABLET ORAL EVERY 6 HOURS PRN
Status: DISCONTINUED | OUTPATIENT
Start: 2021-12-26 | End: 2021-12-28 | Stop reason: HOSPADM

## 2021-12-26 RX ORDER — LACTULOSE 10 G/10G
30 SOLUTION ORAL ONCE
Status: COMPLETED | OUTPATIENT
Start: 2021-12-26 | End: 2021-12-26

## 2021-12-26 RX ORDER — VENLAFAXINE 75 MG/1
75 TABLET ORAL AT BEDTIME
Status: DISCONTINUED | OUTPATIENT
Start: 2021-12-26 | End: 2021-12-28 | Stop reason: HOSPADM

## 2021-12-26 RX ORDER — LIDOCAINE 40 MG/G
CREAM TOPICAL
Status: DISCONTINUED | OUTPATIENT
Start: 2021-12-26 | End: 2021-12-28 | Stop reason: HOSPADM

## 2021-12-26 RX ORDER — ONDANSETRON 2 MG/ML
4 INJECTION INTRAMUSCULAR; INTRAVENOUS EVERY 6 HOURS PRN
Status: DISCONTINUED | OUTPATIENT
Start: 2021-12-26 | End: 2021-12-28 | Stop reason: HOSPADM

## 2021-12-26 RX ORDER — CIPROFLOXACIN 250 MG/1
250 TABLET, FILM COATED ORAL
Status: DISCONTINUED | OUTPATIENT
Start: 2021-12-27 | End: 2021-12-28 | Stop reason: HOSPADM

## 2021-12-26 RX ORDER — VITAMIN E 268 MG
400 CAPSULE ORAL EVERY MORNING
Status: DISCONTINUED | OUTPATIENT
Start: 2021-12-26 | End: 2021-12-28 | Stop reason: HOSPADM

## 2021-12-26 RX ORDER — LACTULOSE 10 G/15ML
20 SOLUTION ORAL
Status: DISCONTINUED | OUTPATIENT
Start: 2021-12-26 | End: 2021-12-28 | Stop reason: HOSPADM

## 2021-12-26 RX ORDER — VITAMIN B COMPLEX
50 TABLET ORAL EVERY MORNING
Status: DISCONTINUED | OUTPATIENT
Start: 2021-12-26 | End: 2021-12-28 | Stop reason: HOSPADM

## 2021-12-26 RX ORDER — RIFAMPIN 300 MG/1
300 CAPSULE ORAL EVERY EVENING
Status: DISCONTINUED | OUTPATIENT
Start: 2021-12-26 | End: 2021-12-26

## 2021-12-26 RX ORDER — LANOLIN ALCOHOL/MO/W.PET/CERES
1000 CREAM (GRAM) TOPICAL
Status: DISCONTINUED | OUTPATIENT
Start: 2021-12-26 | End: 2021-12-28 | Stop reason: HOSPADM

## 2021-12-26 RX ORDER — NICOTINE POLACRILEX 4 MG
15-30 LOZENGE BUCCAL
Status: DISCONTINUED | OUTPATIENT
Start: 2021-12-26 | End: 2021-12-28 | Stop reason: HOSPADM

## 2021-12-26 RX ORDER — ALBUTEROL SULFATE 90 UG/1
1-2 AEROSOL, METERED RESPIRATORY (INHALATION)
Status: DISCONTINUED | OUTPATIENT
Start: 2021-12-26 | End: 2021-12-28 | Stop reason: HOSPADM

## 2021-12-26 RX ORDER — LACTULOSE 10 G/15ML
100 SOLUTION ORAL
Status: DISCONTINUED | OUTPATIENT
Start: 2021-12-26 | End: 2021-12-27

## 2021-12-26 RX ORDER — ONDANSETRON 4 MG/1
4 TABLET, ORALLY DISINTEGRATING ORAL EVERY 6 HOURS PRN
Status: DISCONTINUED | OUTPATIENT
Start: 2021-12-26 | End: 2021-12-28 | Stop reason: HOSPADM

## 2021-12-26 RX ORDER — MULTIVITAMIN,THERAPEUTIC
TABLET ORAL EVERY MORNING
Status: DISCONTINUED | OUTPATIENT
Start: 2021-12-26 | End: 2021-12-28 | Stop reason: HOSPADM

## 2021-12-26 RX ORDER — SPIRONOLACTONE 25 MG/1
100 TABLET ORAL DAILY
Status: DISCONTINUED | OUTPATIENT
Start: 2021-12-26 | End: 2021-12-28 | Stop reason: HOSPADM

## 2021-12-26 RX ORDER — FUROSEMIDE 20 MG
40 TABLET ORAL DAILY
Status: DISCONTINUED | OUTPATIENT
Start: 2021-12-26 | End: 2021-12-28 | Stop reason: HOSPADM

## 2021-12-26 RX ADMIN — ATORVASTATIN CALCIUM 20 MG: 20 TABLET, FILM COATED ORAL at 13:43

## 2021-12-26 RX ADMIN — FOLIC ACID 1 MG: 1 TABLET ORAL at 15:14

## 2021-12-26 RX ADMIN — LACTULOSE 20 G: 20 SOLUTION ORAL at 13:33

## 2021-12-26 RX ADMIN — LACTULOSE 100 G: 10 SOLUTION ORAL at 11:17

## 2021-12-26 RX ADMIN — APIXABAN 5 MG: 5 TABLET, FILM COATED ORAL at 13:43

## 2021-12-26 RX ADMIN — THERA TABS 1 TABLET: TAB at 15:13

## 2021-12-26 RX ADMIN — VENLAFAXINE 75 MG: 75 TABLET ORAL at 21:57

## 2021-12-26 RX ADMIN — FERROUS SULFATE TAB 325 MG (65 MG ELEMENTAL FE) 325 MG: 325 (65 FE) TAB at 15:16

## 2021-12-26 RX ADMIN — APIXABAN 5 MG: 5 TABLET, FILM COATED ORAL at 20:52

## 2021-12-26 RX ADMIN — THIAMINE HCL TAB 100 MG 100 MG: 100 TAB at 13:44

## 2021-12-26 RX ADMIN — Medication 1000 MCG: at 15:13

## 2021-12-26 RX ADMIN — Medication 1 TABLET: at 17:39

## 2021-12-26 RX ADMIN — LACTULOSE 30 G: 10 POWDER, FOR SOLUTION ORAL at 04:36

## 2021-12-26 RX ADMIN — RIFAXIMIN 550 MG: 550 TABLET ORAL at 13:43

## 2021-12-26 RX ADMIN — Medication 10000 UNITS: at 15:17

## 2021-12-26 RX ADMIN — LACTULOSE 30 ML: 20 SOLUTION ORAL at 20:52

## 2021-12-26 RX ADMIN — TOPIRAMATE 50 MG: 50 TABLET ORAL at 15:15

## 2021-12-26 RX ADMIN — RIFAXIMIN 550 MG: 550 TABLET ORAL at 20:52

## 2021-12-26 RX ADMIN — LACTULOSE 20 G: 20 SOLUTION ORAL at 15:39

## 2021-12-26 RX ADMIN — OMEPRAZOLE 20 MG: 20 CAPSULE, DELAYED RELEASE ORAL at 17:45

## 2021-12-26 RX ADMIN — OLANZAPINE 5 MG: 10 INJECTION, POWDER, FOR SOLUTION INTRAMUSCULAR at 05:10

## 2021-12-26 ASSESSMENT — ACTIVITIES OF DAILY LIVING (ADL)
ADLS_ACUITY_SCORE: 9
ADLS_ACUITY_SCORE: 9
PATIENT_/_FAMILY_COMMUNICATION_STYLE: SPOKEN LANGUAGE (ENGLISH OR BILINGUAL)
WEAR_GLASSES_OR_BLIND: YES
ADLS_ACUITY_SCORE: 9
TOILETING_ISSUES: NO
WALKING_OR_CLIMBING_STAIRS: AMBULATION DIFFICULTY, ASSISTANCE 1 PERSON
ADLS_ACUITY_SCORE: 11
ADLS_ACUITY_SCORE: 9
DIFFICULTY_COMMUNICATING: NO
ADLS_ACUITY_SCORE: 9
DOING_ERRANDS_INDEPENDENTLY_DIFFICULTY: YES
ADLS_ACUITY_SCORE: 9
HEARING_DIFFICULTY_OR_DEAF: NO
DIFFICULTY_EATING/SWALLOWING: NO
CONCENTRATING,_REMEMBERING_OR_MAKING_DECISIONS_DIFFICULTY: YES
ADLS_ACUITY_SCORE: 9
DRESSING/BATHING_DIFFICULTY: NO
ADLS_ACUITY_SCORE: 9
FALL_HISTORY_WITHIN_LAST_SIX_MONTHS: NO
ADLS_ACUITY_SCORE: 9
ADLS_ACUITY_SCORE: 9
WALKING_OR_CLIMBING_STAIRS_DIFFICULTY: YES

## 2021-12-26 ASSESSMENT — MIFFLIN-ST. JEOR: SCORE: 1351.64

## 2021-12-26 NOTE — ED TRIAGE NOTES
Pt BIBA  noticed change in mental status. Last know normal 2000 12/25/21. Pt nonverbal now. Just has shaking of the legs.

## 2021-12-26 NOTE — PROGRESS NOTES
..Patient admitted to:unit 5C  Admitted from:ED  Arrived by:gilma  Reason for admission:altered mental status  Patient accompanied by:self  Belongings:in room with patient.  Teaching:unable to do teaching at this time. Patient is very drowsy and difficult to arouse.  Skin double check completed by:Angeles CHATTERJEE RN

## 2021-12-26 NOTE — PROGRESS NOTES
CLINICAL NUTRITION SERVICES - ASSESSMENT NOTE     Nutrition Prescription    RECOMMENDATIONS FOR MDs/PROVIDERS TO ORDER:  1. Patient with history of nurys-en-y and making her prone to thiamine deficiency in addition to poor nutrition. Consider IV thiamine supplementation for wernicke encephalopathy, (AMS presentation is likely due HE in presence of elevated ammonia with TIPs history).     2. If unable to advance diet >> NPO/CL diet,  within the next 1-2 days, would need to consider TF nutrition support. RD is available for consult.     Malnutrition Status:    Moderate malnutrition in the context of acute on chronic presentation    Recommendations already ordered by Registered Dietitian (RD):  None today     Future/Additional Recommendations:  1. Once diet advances, would consider oral nutrition supplements ( ie: ensure max once daily)  2. 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     -Admitted on 12/26/2021 with acute encephalopathy suspected to be HE.   --Patient is obtunded on exam and unable to provide a history    NUTRITION HISTORY  Obtained from H&P: Patient has been struggling to take her home medication and has been eating small amounts at home and very little p.o. the past few days.    Patient known to this service from recent admissions. Patient reported on 12/8: She was struggling with low appetite and has been eating <50% of meals. Patient also reported a dislike of oral nutrition supplements.     CURRENT NUTRITION ORDERS  Diet: NPO x1 ( admitted today), likely due to AMS/ HE    Intake/Tolerance: few days of minimal po intake per H&P  "    LABS  Ammonia: 144 (H) on 12/26, total bili: 2.2 (H), INR:1.50 (elevated)  BUN:13, Cr:0.77  Lytes normal range  Admission ketones: N/A     MEDICATIONS  Lactulose, Rifaximin  Diuresis with lasix   Creon PTA  Vitamin E, Vitamin C, vitamin D3, vitamin A  Thera vit, B12, Calcium citrate with vitamin D,      ANTHROPOMETRICS  Height: 165.1 cm (5' 5\")  Most Recent Weight: 72.6 kg (160 lb) admit wt on 12/26/21  IBW: 56.8 kg (128% IBW)   BMI: 26.63 kg /m2 Overweight BMI 25-29.9  Weight History: 1.8 kg wt loss over the past month ( 2.4% wt loss)  Wt Readings from Last 10 Encounters:   12/26/21 72.6 kg (160 lb)   12/13/21 71.7 kg (158 lb)   12/09/21 68.9 kg (151 lb 12.8 oz)   11/16/21 74.4 kg (164 lb)   11/06/21 74.5 kg (164 lb 4.8 oz)   11/05/21 73.5 kg (162 lb 0.6 oz)   10/05/21 78 kg (171 lb 14.4 oz)   07/07/21 71.9 kg (158 lb 9.6 oz)   05/18/21 71.3 kg (157 lb 1.6 oz)   04/07/21 67.7 kg (149 lb 4.8 oz)       Dosing Weight: 61 kg adjusted wt from admit wt of 72.6 kg and IBW of 56.8 kg     ASSESSED NUTRITION NEEDS  Estimated Energy Needs: 7611-4599 kcals/day (25 - 30 kcals/kg)  Justification: Maintenance  Estimated Protein Needs: 61-73  grams protein/day (1.0-1.2  grams of pro/kg)  Justification: Maintenance, Lower end pending HE   Estimated Fluid Needs: Per provider pending fluid status    PHYSICAL FINDINGS  See malnutrition section below.    MALNUTRITION  % Intake: </=75% for >/= 1 month (severe)  % Weight Loss: Weight loss does not meet criteria  Subcutaneous Fat Loss: from 12/8/21: Facial region:  mild  Muscle Loss: from 12/8/21: Lower arm  (forearm):  mild and Dorsal hand:  mild  Fluid Accumulation/Edema: None noted  Malnutrition Diagnosis: Moderate malnutrition in the context of acute on chronic presentation    NUTRITION DIAGNOSIS  Inadequate oral intake related to AMS/ confusion due to HE as evidenced by limited po for few days PTA with NPO status since admit x 1 day     INTERVENTIONS  Implementation  Nutrition " Education: Not appropriate at this time due to patient condition     Goals  Diet adv v nutrition support within 2-3 days.     Monitoring/Evaluation  Progress toward goals will be monitored and evaluated per protocol.    Daniel Aponte RD/ANDRES  Weekend Pager [phone]

## 2021-12-26 NOTE — ED NOTES
[facility]   ED Nurse to Floor Handoff     Susan Pcukett is a 51 year old female who speaks English and lives with a spouse,  in a home  They arrived in the ED by ambulance from home    ED Chief Complaint: Altered Mental Status    ED Dx;   Final diagnoses:   Altered mental status, unspecified altered mental status type   Hepatic encephalopathy (H)         Needed?: No    Allergies:   Allergies   Allergen Reactions     Methylprednisolone Hives     Droperidol Anxiety     Nsaids Other (See Comments)     GI bleed.     Prednisone Anxiety, Hives and Rash   .  Past Medical Hx:   Past Medical History:   Diagnosis Date     Anemia      Anxiety      Cold sore      Depressive disorder      Diabetes (H)     Type 2 DM/No Insulin      Encephalopathy      Esophageal varices without bleeding (H)     grade I     History of blood transfusion     10/2019     History of seizure     diabetic seizure     Insomnia      Liver cirrhosis secondary to CUETO (H) 05/28/2020     Migraine      Pleural effusion      Thrombocytopenia (H)      Thyroid disease       Baseline Mental status: WDL  Current Mental Status changes: other altered, awakes to pain stimulation, otherwise sleeping in ER    Infection present or suspected this encounter: no  Sepsis suspected: No  Isolation type: No active isolations  Patient tested for COVID 19 prior to admission: NO     Activity level - Baseline/Home:  Independent  Activity Level - Current:   ataxic gait since arrival via EMS    Bariatric equipment needed?: No    In the ED these meds were given:   Medications   lactulose (CEPHULAC) Packet 30 g (30 g Oral Given 12/26/21 4436)   OLANZapine (zyPREXA) 10 MG injection (5 mg Intramuscular Given 12/26/21 0510)       Drips running?  No    Home pump  No    Current LDAs  Peripheral IV 12/26/21 Anterior;Left (Active)   Site Assessment WDL 12/26/21 0432   Line Status Saline locked 12/26/21 0735   Phlebitis Scale 0-->no  symptoms 12/26/21 0735   Infiltration Scale 0 12/26/21 0735   Number of days: 0       Labs results:   Labs Ordered and Resulted from Time of ED Arrival to Time of ED Departure   COMPREHENSIVE METABOLIC PANEL - Abnormal       Result Value    Sodium 142      Potassium 3.9      Chloride 113 (*)     Carbon Dioxide (CO2) 22      Anion Gap 7      Urea Nitrogen 13      Creatinine 0.77      Calcium 9.4      Glucose 201 (*)     Alkaline Phosphatase 200 (*)     AST 50 (*)     ALT 32      Protein Total 7.5      Albumin 3.0 (*)     Bilirubin Total 2.2 (*)     GFR Estimate >90     INR - Abnormal    INR 1.50 (*)    AMMONIA - Abnormal    Ammonia 144 (*)    CBC WITH PLATELETS AND DIFFERENTIAL - Abnormal    WBC Count 2.9 (*)     RBC Count 3.35 (*)     Hemoglobin 10.8 (*)     Hematocrit 32.7 (*)     MCV 98      MCH 32.2      MCHC 33.0      RDW 19.8 (*)     Platelet Count 106 (*)     % Neutrophils 67      % Lymphocytes 22      % Monocytes 7      % Eosinophils 3      % Basophils 1      % Immature Granulocytes 0      NRBCs per 100 WBC 0      Absolute Neutrophils 2.0      Absolute Lymphocytes 0.7 (*)     Absolute Monocytes 0.2      Absolute Eosinophils 0.1      Absolute Basophils 0.0      Absolute Immature Granulocytes 0.0      Absolute NRBCs 0.0     LACTIC ACID WHOLE BLOOD - Normal    Lactic Acid 1.7     TROPONIN I - Normal    Troponin I High Sensitivity 6     PARTIAL THROMBOPLASTIN TIME - Normal    aPTT 38     COVID-19 VIRUS (CORONAVIRUS) BY PCR - Normal    SARS CoV2 PCR Negative     ROUTINE UA WITH MICROSCOPIC REFLEX TO CULTURE   URINE CULTURE       Imaging Studies:   Recent Results (from the past 24 hour(s))   CT Head w/o Contrast    Narrative    EXAM: CT HEAD W/O CONTRAST  LOCATION: [facility]  DATE/TIME: 12/26/2021 5:57 AM    INDICATION: Mental status change, unknown cause  COMPARISON: None.  TECHNIQUE: Routine CT Head without IV contrast. Multiplanar reformats. Dose reduction  "techniques were used.    FINDINGS:  Motion and streak artifact limits aspects of exam.    INTRACRANIAL CONTENTS: No intracranial hemorrhage, extraaxial collection, or mass effect.  No CT evidence of acute infarct. Normal parenchymal attenuation. Normal ventricles and sulci.     VISUALIZED ORBITS/SINUSES/MASTOIDS: No intraorbital abnormality. No paranasal sinus mucosal disease. No middle ear or mastoid effusion.    BONES/SOFT TISSUES: No acute abnormality.      Impression    IMPRESSION:  1.  No acute intracranial process given motion artifact.       Recent vital signs:   /71   Pulse 79   Temp 97.5  F (36.4  C) (Axillary)   Resp 17   Ht 1.651 m (5' 5\")   Wt 72.6 kg (160 lb)   SpO2 100%   BMI 26.63 kg/m      [place] Coma Scale Score: 8 (12/26/21 0730)       Cardiac Rhythm: Normal Sinus  Pt needs tele? No  Skin/wound Issues: None    Code Status: Full Code    Pain control: pt had none - sleeping, showing no signs of nonverbal pain    Nausea control: pt had none    Abnormal labs/tests/findings requiring intervention: see results tab    Family present during ED course? No   Family Comments/Social Situation comments:  stated he was not coming to visit while patient is in ER    Tasks needing completion: None    Mikayla White RN  [facility] -- *[phone] [facility]    "

## 2021-12-26 NOTE — LETTER
Transition Communication Hand-off for Care Transitions to Next Level of Care Provider    Name: Susan Puckett  : 1970  MRN #: [id number]  Primary Care Provider: Harleen Billy     Primary Clinic: [facility] [address] [facility] [address]     Reason for Hospitalization:  Hepatic encephalopathy (H) [K72.90]  Altered mental status, unspecified altered mental status type [R41.82]  Admit Date/Time: 2021  3:43 AM  Discharge Date: 21  Payor Source: Payor: [employer] / Plan: [employer] / Product Type: Indemnity /     Susan Puckett is a 51 year old female with a history of CUETO cirrhosis, complicated by RYGB, hepatic hydrothorax s/p TIPS (21), HE, gr I EV (21), DM II, depression, hypothyroidism, pancreatic insufficiency on Creon, heterozygous H63D who was admitted with hepatic encephalopathy with recent decrease in intake.  She is currently listed for liver transplant with low MELD. Mentation improved with lactulose.         Reason for Communication Hand-off Referral: Fragility    Discharge Plan: Home with resumption of home care and clinic follow-up as recommended.       Discharge Needs Assessment:  Needs      Most Recent Value   Equipment Currently Used at Home cane, straight, shower chair          Follow-up specialty is recommended: Yes    Follow-up plan:    Future Appointments   Date Time Provider Department Center                 2022 11:00 AM Raphael Cook MD [facility]   2022  1:40 PM Steffany Mcginnis RD [facility]   1/10/2022  8:40 AM [id number]   1/10/2022 10:00 AM  LAB [facility]   1/10/2022 10:30 AM Eddie Storey MD [facility]   1/10/2022 11:30 AM Alexei Alcaraz MD [facility]       Any outstanding tests or procedures:        Referrals     Future Labs/Procedures    Home care nursing referral     Comments:    [facility]  Phone: [phone]  Fax: [phone]    Resume  previous home care services.  Please call to schedule your appointment      Home Care OT Referral for Hospital Discharge     Comments:    OT to eval and treat    Your provider has ordered home care - occupational therapy. If you have not been contacted within 2 days of your discharge please call the department phone number listed on the top of this document.    Home Care PT Referral for Hospital Discharge     Comments:    PT to eval and treat    Your provider has ordered home care - physical therapy. If you have not been contacted within 2 days of your discharge please call the department phone number listed on the top of this document.            Anne-Marie Saleem RN, BSN, PHN  Care Coordinator   P: [phone], [facility]       AVS/Discharge Summary is the source of truth; this is a helpful guide for improved communication of patient story

## 2021-12-26 NOTE — CONSULTS
GASTROENTEROLOGY CONSULTATION    Date of Admission:  12/26/2021          ASSESSMENT AND RECOMMENDATIONS:   51 year old female with CUETO cirrhosis listed for liver transplant with outpatient MELD-Na of 12, obesity s/p remote RYGB, DM type 2, hypothyroidism, pancreatic insufficiency on Creon, who is admitted for hepatic encephalopathy.     # CUETO cirrhosis, MELD-Na of  14   # Hepatic encephalopathy  Decompensated disease, based on the presence of hepatic encephalopathy. Her prior main issue was fluid retention including recurrent hepatic hydrothorax. Underwent recent TIPS which likely precipitate hepatic encephalopathy. She manages her own medications, with some concerns for medication compliance while encephalopathic. Will rule out infection. Will also obtain TIPS ultrasound for size of TIPS and will consider if she would be benefit from decreasing her TIPS size for encephalopathy.     Etiology: CUETO   Hepatic encephalopathy: precipitated by recent TIPS, missed medication  Ascites: Noted on examination/ultrasound 11/6/21, pending US this admission  No prior SBP.   TIPSS: Done 11/5/21 for recurrent hepatic hydrothorax  Esophageal/Gastric varices: Last EGD was 8/25/2020 with EV grade 1  Hepatocellular carcinoma: Last CT 11/6/21   Transplant: Listed         Blood type A+  Coagulopathy: INR 1.50  Thrombocytopenia: 106    RECOMMENDATIONS:  -- Continue Rifaximin 550 mg BID  -- Lactulose PO, titrate to 3-4 BMs per day. If unable to tolerate via oral, will need NG placement. Rectal lactulose is not much effective.  -- Monitor transaminases, bilirubin, INR  -- If has ascites on ultrasound, please obtain diagnostic with cell count, gram stain and culture, protein and albumin, as well as cytology  -- High protein diet. Recommend multiple meals during the day, Snacks and shakes during the day/night   -- Please obtain TIPS ultrasound for TIPS size   -- Hold diuretics      Gastroenterology follow up recommendations: Dr. MARISELA  "[place] outpatient    Thank you for involving us in this patient's care. Please do not hesitate to contact the GI service with any questions or concerns.     Patient care plan discussed with Dr. Carrero, GI staff physician.    Erik Ott MD  GI fellow  Page [phone]          Chief Complaint:   We were asked by Dorothy Quinteros of Medicine to evaluate this patient with cirrhosis  History is obtained from the medical record.          History of Present Illness:   51 year old female with PMH CUETO cirrhosis listed for liver transplant (MELD-Na 12) c/b EV, recurrent hepatic hydrothorax, HE, 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 GI consulted for cirrhosis management.    Patient was somnolent and is unable to provide any history. Patient was found confused at home by the . Reported patient has been managing her own medication, and has not been taking lactulose. No fever. At baseline her  helps with ADLs due to her low energy. He notes her being \"pereyra\" at times.     She is listed for liver transplant, following with Dr. Cook.           Past Medical History:   Reviewed and edited as appropriate  Past Medical History:   Diagnosis Date     Anemia      Anxiety      Cold sore      Depressive disorder      Diabetes (H)     Type 2 DM/No Insulin      Encephalopathy      Esophageal varices without bleeding (H)     grade I     History of blood transfusion     10/2019     History of seizure     diabetic seizure     Insomnia      Liver cirrhosis secondary to CUETO (H) 05/28/2020     Migraine      Pleural effusion      Thrombocytopenia (H)      Thyroid disease             Past Surgical History:   Reviewed and edited as appropriate   Past Surgical History:   Procedure Laterality Date     APPENDECTOMY      1989     COLONOSCOPY      1/2020 at [facility]      ENT SURGERY  1998    tempanoplasty     GI SURGERY   "    EGD August 2020 at [facility]      GYN SURGERY  2010    laparoscopic ablation     IR TRANSVEN INTRAHEPATIC PORTOSYST SHUNT  11/5/2021     MAMMOPLASTY REDUCTION BILATERAL  2004     OR STAB PHELBECTOMY VARICOSE VEINS, LESS THAN 10 INCISIONS, ONE EXTREMITY  2020     RNY gastric bypass  01/2006    retoocolic, retrogastric, Kaycee Rodriguezet     TONSILLECTOMY & ADENOIDECTOMY Bilateral 1978     WISDOM TEETH EXTRACTION              Previous Endoscopy:   No results found for this or any previous visit.         Social History:   Reviewed and edited as appropriate  Social History     Socioeconomic History     Marital status:      Spouse name: Not on file     Number of children: Not on file     Years of education: Not on file     Highest education level: Bachelor's degree (e.g., BA, AB, BS)   Occupational History     Not on file   Tobacco Use     Smoking status: Never Smoker     Smokeless tobacco: Never Used   Substance and Sexual Activity     Alcohol use: Not Currently     Comment: Last drink was in 2017     Drug use: Never     Sexual activity: Not on file   Other Topics Concern     Parent/sibling w/ CABG, MI or angioplasty before 65F 55M? Not Asked   Social History Narrative     Not on file     Social Determinants of Health     Financial Resource Strain: Not on file   Food Insecurity: No Food Insecurity     Worried About Running Out of Food in the Last Year: Never true     Ran Out of Food in the Last Year: Never true   Transportation Needs: No Transportation Needs     Lack of Transportation (Medical): No     Lack of Transportation (Non-Medical): No   Physical Activity: Not on file   Stress: Not on file   Social Connections: Not on file   Intimate Partner Violence: Not on file   Housing Stability: Not on file            Family History:   Reviewed and edited as appropriate  No known history of gastrointestinal/liver disease or  gastrointestinal malignancies       Allergies:   Reviewed and edited as appropriate      Allergies   Allergen Reactions     Methylprednisolone Hives     Droperidol Anxiety     Nsaids Other (See Comments)     GI bleed.     Prednisone Anxiety, Hives and Rash            Medications:     Medications Prior to Admission   Medication Sig Dispense Refill Last Dose     acetaminophen (TYLENOL) 500 MG tablet Take 1 tablet (500 mg) by mouth every 6 hours as needed for mild pain        acetone urine (KETOSTIX) test strip 1 strip        albuterol (PROAIR HFA/PROVENTIL HFA/VENTOLIN HFA) 108 (90 Base) MCG/ACT inhaler Inhale 1-2 puffs into the lungs (Patient not taking: Reported on 11/16/2021)        amylase-lipase-protease (CREON 6) [id number] units CPEP Take 3 capsules by mouth 3 times daily (with meals)        Apixaban Starter Pack (ELIQUIS DVT/PE STARTER PACK) 5 MG TBPK Take 10 mg by mouth 2 times daily for 7 days, THEN 5 mg 2 times daily for 23 days. 1 each 0      Apixaban Starter Pack (ELIQUIS DVT/PE STARTER PACK) 5 MG TBPK Take 10 mg by mouth 2 times daily for 7 days, THEN 5 mg 2 times daily for 23 days. 1 each 0      atorvastatin (LIPITOR) 20 MG tablet Take 20 mg by mouth every morning         calcium carbonate (TUMS) 500 MG chewable tablet Take 1 tablet by mouth daily as needed for heartburn         calcium citrate-vitamin D (CITRACAL W/D) 250-100 MG-UNIT tablet Take 2 tablets by mouth 2 times daily (with meals) (Patient taking differently: Take 1 tablet by mouth 2 times daily (with meals) ) 336 tablet 3      Continuous Blood Gluc  (DEXCOM G6 ) LUNA Use as directed for continuous glucose monitoring.        Continuous Blood Gluc Sensor (DEXCOM G6 SENSOR) MISC Use as directed for continuous glucose monitoring.  Change sensor every 10 days.        Continuous Blood Gluc Transmit (DEXCOM G6 TRANSMITTER) MISC Use as directed for continuous glucose monitoring.  Change every 3 months.        cyanocobalamin (VITAMIN B-12) 1000 MCG tablet Take 1,000 mcg by mouth every 7 days         Ferrous  Sulfate (IRON) 325 (65 FE) MG tablet Take 1 tablet by mouth every morning         folic acid (FOLVITE) 1 MG tablet Take 1 mg by mouth every morning         furosemide (LASIX) 40 MG tablet Take 1 tablet (40 mg) by mouth daily 90 tablet 3      hydrOXYzine (ATARAX) 25 MG tablet Take 25 mg by mouth 3 times daily as needed for anxiety        insulin aspart (NOVOLOG FLEXPEN) 100 UNIT/ML pen Inject 1u/50>200 every 4 hours as needed for high blood glucose. Up to 20 units daily.  Indications: Diabetes Mellitus        insulin glargine (LANTUS PEN) 100 UNIT/ML pen Inject 12 Units Subcutaneous every morning Takes around 12noon        insulin pen needle (BD GRISEL U/F) 32G X 4 MM miscellaneous Inject 1 each Subcutaneous        lactulose (CHRONULAC) 10 GM/15ML solution TAKE 30 MLS BY MOUTH 3 TIMES DAILY (Patient taking differently: 3 times daily ) 3784 mL 5      levothyroxine (SYNTHROID/LEVOTHROID) 200 MCG tablet Take 200 mcg by mouth        metoclopramide (REGLAN) 10 MG tablet Take 10 mg by mouth        Multiple Vitamin (MULTIVITAMIN PO) Take 2 tablets by mouth every morning         multivitamin (DEKAS ESSENTIAL) capsule Take 1 capsule by mouth daily         omeprazole (PRILOSEC) 20 MG DR capsule Take 1 capsule (20 mg) by mouth daily 30 capsule 0      ondansetron (ZOFRAN-ODT) 4 MG ODT tab Place 1 tablet (4 mg) under the tongue every 6 hours as needed for nausea 120 tablet 1      oxyCODONE (ROXICODONE) 5 MG tablet Take 1 tablet (5 mg) by mouth every 6 hours as needed for pain 12 tablet 0      polyethylene glycol (MIRALAX) 17 g packet Take 1 packet by mouth daily as needed for constipation        prochlorperazine (COMPAZINE) 10 MG tablet Take 1 tablet (10 mg) by mouth every 6 hours as needed for nausea or vomiting 120 tablet 1      rifampin (RIFADIN) 300 MG capsule TAKE 1 CAPSULE BY MOUTH EVERY DAY (Patient taking differently: every evening ) 90 capsule 3      rifaximin (XIFAXAN) 550 MG TABS tablet Take 1 tablet (550 mg) by mouth 2  "times daily 60 tablet 11      Rimegepant Sulfate 75 MG TBDP Take 75 mg by mouth        simethicone (MYLICON) 80 MG chewable tablet Take 80 mg by mouth every 6 hours as needed for flatulence or cramping        sitagliptin (JANUVIA) 100 MG tablet Take 100 mg by mouth every morning         spironolactone (ALDACTONE) 100 MG tablet Take 1 tablet (100 mg) by mouth daily 30 tablet 1      SUMAtriptan (IMITREX) 50 MG tablet Take 50 mg by mouth at onset of headache for migraine         topiramate (TOPAMAX) 50 MG tablet Take 50 mg by mouth daily         traZODone (DESYREL) 100 MG tablet Take  mg by mouth nightly as needed for sleep        valACYclovir (VALTREX) 1000 mg tablet Take 1,000 mg by mouth daily as needed (at onset of cold sore)         venlafaxine (EFFEXOR) 75 MG tablet Take 1 tablet (75 mg) by mouth At Bedtime 30 tablet 0      vitamin A 3 MG (28549 UNITS) capsule Take 10,000 Units by mouth every morning         vitamin C (ASCORBIC ACID) 1000 MG TABS         vitamin D3 (CHOLECALCIFEROL) 50 mcg (2000 units) tablet Take 1 tablet (50 mcg) by mouth daily 90 tablet 3      vitamin D3 (CHOLECALCIFEROL) 50 mcg (2000 units) tablet Take 1 tablet (50 mcg) by mouth daily (Patient taking differently: Take 1 tablet by mouth every morning ) 90 tablet 3      vitamin E (TOCOPHEROL) 400 units (180 mg) capsule Take 400 Units by mouth every morning                 Review of Systems:   Unable to review.         Physical Exam:   /79 (BP Location: Right arm)   Pulse 64   Temp 97.6  F (36.4  C) (Axillary)   Resp 16   Ht 1.651 m (5' 5\")   Wt 72.6 kg (160 lb)   SpO2 100%   BMI 26.63 kg/m    Wt:   Wt Readings from Last 2 Encounters:   12/26/21 72.6 kg (160 lb)   12/13/21 71.7 kg (158 lb)      Constitutional: somnolent, awake with voice, not following command, not in acute distress  Eyes: Sclera anicteric/injected  Ears/nose/mouth/throat: mucus membranes moist, hearing intact  Neck: supple, thyroid normal size  CV: No " edema  Respiratory: Unlabored breathing  Lymph: No axillary, submandibular, supraclavicular or inguinal lymphadenopathy  Abdomen: Non-distended, nontender, no peritoneal signs  Skin: warm, perfused, no jaundice  Neuro: AAO x 3, + asterixis         Data:   Labs and imaging below were independently reviewed and interpreted    BMP  Recent Labs   Lab 12/26/21  1008 12/26/21  0402   NA  --  142   POTASSIUM 4.0 3.9   CHLORIDE  --  113*   MAURI  --  9.4   CO2  --  22   BUN  --  13   CR  --  0.77   GLC  --  201*     CBC  Recent Labs   Lab 12/26/21  0402   WBC 2.9*   RBC 3.35*   HGB 10.8*   HCT 32.7*   MCV 98   MCH 32.2   MCHC 33.0   RDW 19.8*   *     INR  Recent Labs   Lab 12/26/21  0402   INR 1.50*     LFTs  Recent Labs   Lab 12/26/21  0402   ALKPHOS 200*   AST 50*   ALT 32   BILITOTAL 2.2*   PROTTOTAL 7.5   ALBUMIN 3.0*      PANCNo lab results found in last 7 days.    Imaging: no abdominal imaging at this time    Attestation:  This patient has been seen and evaluated by me, Raymundo Gutierrez.  Discussed with the house staff team or resident(s) and agree with the findings and plan in this note.

## 2021-12-26 NOTE — H&P
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     Acute Encephalopathy, suspect HE, Hx HE  CUETO cirrhosis c/b EV (EGD 4/2021 grade 1 varices without bleeding)  Recent hepatic hydrothorax (thora 11/28/21: 2L removed, culture neg)  S/p TIPS 11/5/2021  Listed for liver transplant (MELD 14)  Follows with Dr. Cook. Presenting with encephalopathy on admission. Head CT negative for intracranial process. Lactate 1.7. Ammonia 144. T-bili 2.2. Abdominal ultrasound on 11/29 at [facility] illustrated hepatic steatosis and patent TIPS with velocities up to 80 cm/sec. Started on empiric antibiotics with cefepime (12/03 - 12/06) and flagyl (12/03 - 12/06) but discontinued here on arrival.  - infection workup:   - blood cx x 2 12/16/21   - UA with reflex   - COVID-19 neg 12/26   - repeat CXR pending  - 30ml lactulose in ED, continue 30ml TID and [place]  - consult hepatology  - Continue PTA rifaximin   - Diuresis: spironolactone at 100 mg daily and Lasix at 40 mg daily  - Continue PTA Creon   - Continue omeprazole    Mild hypotension  bp normotensive at BL and 95/62 in ED, pulse 70s.   - trend vitals  - favor albumin over crystalloids    Pancytopenia  Anemia, KARL  Stable on admission (plt 106 and trend in the 60s normally). Smear 11/25/20 negative for hemolysis, mod macrocytic anemia, preserved RBC regeneration, slight thrombocytopenia normal plt morpholgy.   - Continue PTA ferrous fluconate 324 mg daily   - trend CBC    Nonocclusive DVT of right upper extremity, likely 2/2 PICC (12/6/21)   PICC line removed. Follow up US 12/13 with interval decreased  extent of DVT but with new acute occlusive component. CTA chest 12/13 neg for clear PE.   - low threshold to repeat CTA chest if chest pain or cardiopulmonary sx  - given patient has persistent edema and missed doses of apixaban will repeat US imaging (bilateral LE and UE)  -Continue apixaban x 3 months, started 12/9 (now on 5mg BID)     DMII with long term use of insulin (A1C 5.9 10/20/21)  PTA on lantus 12 units HS, Januvia 100 mg daily. A1c 5.9 on 10/20/21.   - Lantus 12 units nightly   - insulin aspart 1 unit/50 glucose> 200, up to 20 unit/day  - Hypoglycemic protocol   - Consistent carb diet   - check a1C  - pt has a Dexcom CGM     Moderate malnutrition in the context of acute on chronic illness  - Nutrition consult   - Vitamin replacements: MVI, calcium, Vits A, D, E, K, B12, folate, thiamine  - electrolyte replacement protocols orderd    Dysphagia   Ongoing chronic dysphagia. Scheduled for EGD in 1/2022.    -Speech consult appreciated, cleared for regular diet with thin textures.  Should sit upright when eating.  - Continue PPI      Subclinical Hypothyroidism   TSH 7.1 on 12/6/21, fT4 1.16  - repeat TSH with reflex  -  synthroid 200 mcg daily     Migraines   - PTA on sumatriptan 100 mg PRN   - continue PTA Rimegepant  - continue PTA topamax     Depression   Anxiety   - PTA on venlafaxine 150 mg HS    - Hydroxyzine TID PRN      Dyslipidemia   - Continue PTA atorvastatin 20 mg daily     Hx of GEORGETTE  Occurred in the setting of increasing dirusis (spironolactone increased to 250mg/d and lasix to total 120mg/day oral)    Hx GGO on check CTA  Bronchiolitis pattern  - monitor     Diet:  ADAT to regular moderate CHO  DVT Prophylaxis: DOAC  Rossi Catheter: Not present  Central Lines: None  Code Status:   full     Clinically Significant Risk Factors Present on Admission             # Coagulation Defect: home medication list includes an anticoagulant medication  # Thrombocytopenia: Plts = 106 10e3/uL (Ref range: 150 - 450  "10e3/uL) on admission, will monitor for bleeding   # Overweight: last Body mass index is 26.63 kg/m .   }     Disposition Plan   Expected Discharge:  3-7 days   Anticipated discharge location:  Awaiting care coordination huddle  Delays:     return to  mental status, infectious workup       The patient's care was discussed with the Attending Physician, Dr. Ferguson, Bedside Nurse, Patient and Patient's Family.    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     Now is nonverbal and with leg shaking reported in ED.     Discussed with  over the phone and he provides the bulk of the history. Patient has been struggling to take her home medication and has been eating small amounts at home and very little p.o. the past few days.   notes she has inconsistently been taking her lactulose and has been forgetting doses. She has forgotten doses of apixaban.  Visiting nurse comes 2 x weekly and prepares her medications for the week. Her  is working on paperwork for home PT and a PCA.     Patient is obtunded on exam and unable to provide a history and repeats \"Susan\".    Tuesday (12/21) patient went shopping and was tired.  She slept well and seemed to have energy up through 12/25 and was \"out of it\" that evening on return home.  She became groggy and unable to stay " "awake early evening ~5pm 12/25. She took a nap 1.5hr and continued to be groggy. Her  had difficulty giving her meds, and notes he gave her the \"blood thinner\".   -12/25: when she awoke after 11pm she went to the bathroom --> kitchen and stated she is hungry and said she was thirsty and appeared to be confused.  She slept until 3:30am and was found crying and confused in the bathroom.  She was incontinent of stool and needed to be cleaned up by family.     BL mental status: At baseline her  helps with ADLs due to her low energy. He notes her being \"pereyra\" at times.     She has 3-4 BM/day, for the last 3 days  doesn't know.  She knew she was home and was unable to give details.      Pain: She only takes oxycodone if arm pain is severe.     DM: Uses a dexcom glucose monitor.     Review of Systems    The 10 point Review of Systems is negative other than noted in the HPI or here.       Past Medical History    I have reviewed this patient's medical history and updated it with pertinent information if needed.   Past Medical History:   Diagnosis Date     Anemia      Anxiety      Cold sore      Depressive disorder      Diabetes (H)     Type 2 DM/No Insulin      Encephalopathy      Esophageal varices without bleeding (H)     grade I     History of blood transfusion     10/2019     History of seizure     diabetic seizure     Insomnia      Liver cirrhosis secondary to CUETO (H) 05/28/2020     Migraine      Pleural effusion      Thrombocytopenia (H)      Thyroid disease        Past Surgical History   I have reviewed this patient's surgical history and updated it with pertinent information if needed.  Past Surgical History:   Procedure Laterality Date     APPENDECTOMY      1989     COLONOSCOPY      1/2020 at [facility]      ENT SURGERY  1998    tempanoplasty     GI SURGERY      EGD August 2020 at [facility]      GYN SURGERY  2010    laparoscopic ablation     IR TRANSVEN INTRAHEPATIC PORTOSYST SHUNT  " 11/5/2021     MAMMOPLASTY REDUCTION BILATERAL  2004     IN STAB PHELBECTOMY VARICOSE VEINS, LESS THAN 10 INCISIONS, ONE EXTREMITY  2020     RNY gastric bypass  01/2006    retoocolic, retrogastric, [facility]     TONSILLECTOMY & ADENOIDECTOMY Bilateral 1978     WISDOM TEETH EXTRACTION         Social History   I have reviewed this patient's social history and updated it with pertinent information if needed.  Social History     Tobacco Use     Smoking status: Never Smoker     Smokeless tobacco: Never Used   Substance Use Topics     Alcohol use: Not Currently     Comment: Last drink was in 2017     Drug use: Never       Family History   I have reviewed this patient's family history and updated it with pertinent information if needed.  Family History   Problem Relation Age of Onset     Anesthesia Reaction Nephew         PONV     Bleeding Disorder No family hx of      Clotting Disorder No family hx of        Prior to Admission Medications   Prior to Admission Medications   Prescriptions Last Dose Informant Patient Reported? Taking?   Apixaban Starter Pack (ELIQUIS DVT/PE STARTER PACK) 5 MG TBPK   No No   Sig: Take 10 mg by mouth 2 times daily for 7 days, THEN 5 mg 2 times daily for 23 days.   Apixaban Starter Pack (ELIQUIS DVT/PE STARTER PACK) 5 MG TBPK   No No   Sig: Take 10 mg by mouth 2 times daily for 7 days, THEN 5 mg 2 times daily for 23 days.   Continuous Blood Gluc  (DEXCOM G6 ) LUNA  Self Yes No   Sig: Use as directed for continuous glucose monitoring.   Continuous Blood Gluc Sensor (DEXCOM G6 SENSOR) MISC  Self Yes No   Sig: Use as directed for continuous glucose monitoring.  Change sensor every 10 days.   Continuous Blood Gluc Transmit (DEXCOM G6 TRANSMITTER) MISC  Self Yes No   Sig: Use as directed for continuous glucose monitoring.  Change every 3 months.   Ferrous Sulfate (IRON) 325 (65 FE) MG tablet  Self Yes No   Sig: Take 1 tablet by mouth every morning    Multiple Vitamin (MULTIVITAMIN  PO)  Self Yes No   Sig: Take 2 tablets by mouth every morning    Rimegepant Sulfate 75 MG TBDP  Self Yes No   Sig: Take 75 mg by mouth   SUMAtriptan (IMITREX) 50 MG tablet  Self Yes No   Sig: Take 50 mg by mouth at onset of headache for migraine    acetaminophen (TYLENOL) 500 MG tablet   No No   Sig: Take 1 tablet (500 mg) by mouth every 6 hours as needed for mild pain   acetone urine (KETOSTIX) test strip  Self Yes No   Si strip   albuterol (PROAIR HFA/PROVENTIL HFA/VENTOLIN HFA) 108 (90 Base) MCG/ACT inhaler  Self Yes No   Sig: Inhale 1-2 puffs into the lungs   Patient not taking: Reported on 2021   amylase-lipase-protease (CREON 6) [phone] units CPEP  Self Yes No   Sig: Take 3 capsules by mouth 3 times daily (with meals)   atorvastatin (LIPITOR) 20 MG tablet  Self Yes No   Sig: Take 20 mg by mouth every morning    calcium carbonate (TUMS) 500 MG chewable tablet  Self Yes No   Sig: Take 1 tablet by mouth daily as needed for heartburn    calcium citrate-vitamin D (CITRACAL W/D) 250-100 MG-UNIT tablet  Self No No   Sig: Take 2 tablets by mouth 2 times daily (with meals)   Patient taking differently: Take 1 tablet by mouth 2 times daily (with meals)    cyanocobalamin (VITAMIN B-12) 1000 MCG tablet  Self Yes No   Sig: Take 1,000 mcg by mouth every 7 days    folic acid (FOLVITE) 1 MG tablet  Self Yes No   Sig: Take 1 mg by mouth every morning    furosemide (LASIX) 40 MG tablet   No No   Sig: Take 1 tablet (40 mg) by mouth daily   hydrOXYzine (ATARAX) 25 MG tablet  Self Yes No   Sig: Take 25 mg by mouth 3 times daily as needed for anxiety   insulin aspart (NOVOLOG FLEXPEN) 100 UNIT/ML pen  Self Yes No   Sig: Inject 1u/50>200 every 4 hours as needed for high blood glucose. Up to 20 units daily.  Indications: Diabetes Mellitus   insulin glargine (LANTUS PEN) 100 UNIT/ML pen  Self Yes No   Sig: Inject 12 Units Subcutaneous every morning Takes around 12noon   insulin pen needle (BD GRISEL U/F) 32G X 4 MM  miscellaneous  Self Yes No   Sig: Inject 1 each Subcutaneous   lactulose (CHRONULAC) 10 GM/15ML solution  Self No No   Sig: TAKE 30 MLS BY MOUTH 3 TIMES DAILY   Patient taking differently: 3 times daily    levothyroxine (SYNTHROID/LEVOTHROID) 200 MCG tablet  Self Yes No   Sig: Take 200 mcg by mouth   metoclopramide (REGLAN) 10 MG tablet  Self Yes No   Sig: Take 10 mg by mouth   multivitamin (DEKAS ESSENTIAL) capsule  Self Yes No   Sig: Take 1 capsule by mouth daily    omeprazole (PRILOSEC) 20 MG DR capsule  Self No No   Sig: Take 1 capsule (20 mg) by mouth daily   ondansetron (ZOFRAN-ODT) 4 MG ODT tab   No No   Sig: Place 1 tablet (4 mg) under the tongue every 6 hours as needed for nausea   oxyCODONE (ROXICODONE) 5 MG tablet   No No   Sig: Take 1 tablet (5 mg) by mouth every 6 hours as needed for pain   polyethylene glycol (MIRALAX) 17 g packet  Self Yes No   Sig: Take 1 packet by mouth daily as needed for constipation   prochlorperazine (COMPAZINE) 10 MG tablet   No No   Sig: Take 1 tablet (10 mg) by mouth every 6 hours as needed for nausea or vomiting   rifampin (RIFADIN) 300 MG capsule  Self No No   Sig: TAKE 1 CAPSULE BY MOUTH EVERY DAY   Patient taking differently: every evening    rifaximin (XIFAXAN) 550 MG TABS tablet  Self No No   Sig: Take 1 tablet (550 mg) by mouth 2 times daily   simethicone (MYLICON) 80 MG chewable tablet  Self Yes No   Sig: Take 80 mg by mouth every 6 hours as needed for flatulence or cramping   sitagliptin (JANUVIA) 100 MG tablet  Self Yes No   Sig: Take 100 mg by mouth every morning    spironolactone (ALDACTONE) 100 MG tablet   No No   Sig: Take 1 tablet (100 mg) by mouth daily   topiramate (TOPAMAX) 50 MG tablet  Self Yes No   Sig: Take 50 mg by mouth daily    traZODone (DESYREL) 100 MG tablet  Self Yes No   Sig: Take  mg by mouth nightly as needed for sleep   valACYclovir (VALTREX) 1000 mg tablet  Self Yes No   Sig: Take 1,000 mg by mouth daily as needed (at onset of cold  sore)    venlafaxine (EFFEXOR) 75 MG tablet  Self No No   Sig: Take 1 tablet (75 mg) by mouth At Bedtime   vitamin A 3 MG (14945 UNITS) capsule  Self Yes No   Sig: Take 10,000 Units by mouth every morning    vitamin C (ASCORBIC ACID) 1000 MG TABS  Self Yes No   vitamin D3 (CHOLECALCIFEROL) 50 mcg (2000 units) tablet  Self No No   Sig: Take 1 tablet (50 mcg) by mouth daily   Patient taking differently: Take 1 tablet by mouth every morning    vitamin D3 (CHOLECALCIFEROL) 50 mcg (2000 units) tablet  Self No No   Sig: Take 1 tablet (50 mcg) by mouth daily   vitamin E (TOCOPHEROL) 400 units (180 mg) capsule  Self Yes No   Sig: Take 400 Units by mouth every morning       Facility-Administered Medications: None     Allergies   Allergies   Allergen Reactions     Methylprednisolone Hives     Droperidol Anxiety     Nsaids Other (See Comments)     GI bleed.     Prednisone Anxiety, Hives and Rash       Physical Exam   Vital Signs: Temp: 97.5  F (36.4  C) Temp src: Axillary BP: 108/71 Pulse: 79   Resp: 17 SpO2: 100 % O2 Device: None (Room air)    Weight: 160 lbs 0 oz  GENERAL: Alert and oriented x 3. NAD. Ambulatory. Cooperative.   HEENT: Anicteric sclera. Mucous membranes moist. NC. AT. EOMI limited d/t patient participation. Pupils equal and round   CV: RRR. S1, S2. No murmurs appreciated.   RESPIRATORY: Effort normal on RA. Lungs CTAB with no wheezing, rales, rhonchi.   GI: Abdomen soft, NT, ND, NABS  NEUROLOGICAL: No focal deficits. Moves all extremities.  CN 2-12 grossly intact.  EXTREMITIES: No peripheral edema. Intact bilateral pedal pulses.   SKIN: No jaundice. No rashes.  BACK: no lesion     Data   Data reviewed today: I reviewed all medications, new labs and imaging results over the last 24 hours. I personally reviewed the head CT image(s) showing no midline shift, mass, edema.    Recent Labs   Lab 12/26/21  1008 12/26/21  0402   WBC  --  2.9*   HGB  --  10.8*   MCV  --  98   PLT  --  106*   INR  --  1.50*   NA  --   142   POTASSIUM 4.0 3.9   CHLORIDE  --  113*   CO2  --  22   BUN  --  13   CR  --  0.77   ANIONGAP  --  7   MAURI  --  9.4   GLC  --  201*   ALBUMIN  --  3.0*   PROTTOTAL  --  7.5   BILITOTAL  --  2.2*   ALKPHOS  --  200*   ALT  --  32   AST  --  50*

## 2021-12-27 ENCOUNTER — TELEPHONE (OUTPATIENT)
Dept: TRANSPLANT | Facility: CLINIC | Age: 51
End: 2021-12-27
Payer: COMMERCIAL

## 2021-12-27 ENCOUNTER — APPOINTMENT (OUTPATIENT)
Dept: ULTRASOUND IMAGING | Facility: CLINIC | Age: 51
DRG: 442 | End: 2021-12-27
Attending: PHYSICIAN ASSISTANT
Payer: COMMERCIAL

## 2021-12-27 ENCOUNTER — APPOINTMENT (OUTPATIENT)
Dept: OCCUPATIONAL THERAPY | Facility: CLINIC | Age: 51
DRG: 442 | End: 2021-12-27
Attending: PHYSICIAN ASSISTANT
Payer: COMMERCIAL

## 2021-12-27 LAB
ALBUMIN SERPL-MCNC: 2.6 G/DL (ref 3.4–5)
ALP SERPL-CCNC: 198 U/L (ref 40–150)
ALT SERPL W P-5'-P-CCNC: 36 U/L (ref 0–50)
ANION GAP SERPL CALCULATED.3IONS-SCNC: 9 MMOL/L (ref 3–14)
AST SERPL W P-5'-P-CCNC: 72 U/L (ref 0–45)
ATRIAL RATE - MUSE: 74 BPM
BACTERIA UR CULT: NORMAL
BILIRUB SERPL-MCNC: 2.1 MG/DL (ref 0.2–1.3)
BUN SERPL-MCNC: 10 MG/DL (ref 7–30)
CALCIUM SERPL-MCNC: 9 MG/DL (ref 8.5–10.1)
CHLORIDE BLD-SCNC: 113 MMOL/L (ref 94–109)
CO2 SERPL-SCNC: 19 MMOL/L (ref 20–32)
CREAT SERPL-MCNC: 0.74 MG/DL (ref 0.52–1.04)
DIASTOLIC BLOOD PRESSURE - MUSE: NORMAL MMHG
ERYTHROCYTE [DISTWIDTH] IN BLOOD BY AUTOMATED COUNT: 19.7 % (ref 10–15)
GFR SERPL CREATININE-BSD FRML MDRD: >90 ML/MIN/1.73M2
GLUCOSE BLD-MCNC: 107 MG/DL (ref 70–99)
GLUCOSE BLD-MCNC: 244 MG/DL (ref 70–99)
GLUCOSE BLDC GLUCOMTR-MCNC: 105 MG/DL (ref 70–99)
GLUCOSE BLDC GLUCOMTR-MCNC: 138 MG/DL (ref 70–99)
GLUCOSE BLDC GLUCOMTR-MCNC: 242 MG/DL (ref 70–99)
GLUCOSE BLDC GLUCOMTR-MCNC: 79 MG/DL (ref 70–99)
HCT VFR BLD AUTO: 29.7 % (ref 35–47)
HGB BLD-MCNC: 9.7 G/DL (ref 11.7–15.7)
INR PPP: 2.04 (ref 0.85–1.15)
INTERPRETATION ECG - MUSE: NORMAL
MAGNESIUM SERPL-MCNC: 1.8 MG/DL (ref 1.6–2.3)
MCH RBC QN AUTO: 32.8 PG (ref 26.5–33)
MCHC RBC AUTO-ENTMCNC: 32.7 G/DL (ref 31.5–36.5)
MCV RBC AUTO: 100 FL (ref 78–100)
P AXIS - MUSE: 33 DEGREES
PHOSPHATE SERPL-MCNC: 3.2 MG/DL (ref 2.5–4.5)
PLATELET # BLD AUTO: 118 10E3/UL (ref 150–450)
POTASSIUM BLD-SCNC: 2.8 MMOL/L (ref 3.4–5.3)
POTASSIUM BLD-SCNC: 3.4 MMOL/L (ref 3.4–5.3)
PR INTERVAL - MUSE: 136 MS
PROT SERPL-MCNC: 6.2 G/DL (ref 6.8–8.8)
QRS DURATION - MUSE: 80 MS
QT - MUSE: 438 MS
QTC - MUSE: 486 MS
R AXIS - MUSE: -24 DEGREES
RBC # BLD AUTO: 2.96 10E6/UL (ref 3.8–5.2)
SODIUM SERPL-SCNC: 141 MMOL/L (ref 133–144)
SYSTOLIC BLOOD PRESSURE - MUSE: NORMAL MMHG
T AXIS - MUSE: -4 DEGREES
VENTRICULAR RATE- MUSE: 74 BPM
WBC # BLD AUTO: 4 10E3/UL (ref 4–11)

## 2021-12-27 PROCEDURE — 80053 COMPREHEN METABOLIC PANEL: CPT | Performed by: PHYSICIAN ASSISTANT

## 2021-12-27 PROCEDURE — 120N000005 HC R&B MS OVERFLOW UMMC

## 2021-12-27 PROCEDURE — 85027 COMPLETE CBC AUTOMATED: CPT | Performed by: PHYSICIAN ASSISTANT

## 2021-12-27 PROCEDURE — 82947 ASSAY GLUCOSE BLOOD QUANT: CPT | Performed by: STUDENT IN AN ORGANIZED HEALTH CARE EDUCATION/TRAINING PROGRAM

## 2021-12-27 PROCEDURE — 99233 SBSQ HOSP IP/OBS HIGH 50: CPT | Performed by: NURSE PRACTITIONER

## 2021-12-27 PROCEDURE — 83735 ASSAY OF MAGNESIUM: CPT | Performed by: PHYSICIAN ASSISTANT

## 2021-12-27 PROCEDURE — 36415 COLL VENOUS BLD VENIPUNCTURE: CPT | Performed by: PHYSICIAN ASSISTANT

## 2021-12-27 PROCEDURE — 250N000013 HC RX MED GY IP 250 OP 250 PS 637: Performed by: STUDENT IN AN ORGANIZED HEALTH CARE EDUCATION/TRAINING PROGRAM

## 2021-12-27 PROCEDURE — 84132 ASSAY OF SERUM POTASSIUM: CPT | Performed by: STUDENT IN AN ORGANIZED HEALTH CARE EDUCATION/TRAINING PROGRAM

## 2021-12-27 PROCEDURE — 85610 PROTHROMBIN TIME: CPT | Performed by: PHYSICIAN ASSISTANT

## 2021-12-27 PROCEDURE — 250N000013 HC RX MED GY IP 250 OP 250 PS 637: Performed by: PHYSICIAN ASSISTANT

## 2021-12-27 PROCEDURE — 97535 SELF CARE MNGMENT TRAINING: CPT | Mod: GO

## 2021-12-27 PROCEDURE — 84100 ASSAY OF PHOSPHORUS: CPT | Performed by: PHYSICIAN ASSISTANT

## 2021-12-27 PROCEDURE — 97166 OT EVAL MOD COMPLEX 45 MIN: CPT | Mod: GO

## 2021-12-27 PROCEDURE — 76700 US EXAM ABDOM COMPLETE: CPT

## 2021-12-27 PROCEDURE — 36415 COLL VENOUS BLD VENIPUNCTURE: CPT | Performed by: STUDENT IN AN ORGANIZED HEALTH CARE EDUCATION/TRAINING PROGRAM

## 2021-12-27 PROCEDURE — 99233 SBSQ HOSP IP/OBS HIGH 50: CPT | Performed by: STUDENT IN AN ORGANIZED HEALTH CARE EDUCATION/TRAINING PROGRAM

## 2021-12-27 PROCEDURE — 93975 VASCULAR STUDY: CPT | Mod: 26 | Performed by: STUDENT IN AN ORGANIZED HEALTH CARE EDUCATION/TRAINING PROGRAM

## 2021-12-27 PROCEDURE — 97530 THERAPEUTIC ACTIVITIES: CPT | Mod: GO

## 2021-12-27 RX ORDER — POTASSIUM CHLORIDE 750 MG/1
40 TABLET, EXTENDED RELEASE ORAL ONCE
Status: COMPLETED | OUTPATIENT
Start: 2021-12-27 | End: 2021-12-27

## 2021-12-27 RX ORDER — POTASSIUM CHLORIDE 750 MG/1
20 TABLET, EXTENDED RELEASE ORAL ONCE
Status: COMPLETED | OUTPATIENT
Start: 2021-12-27 | End: 2021-12-27

## 2021-12-27 RX ORDER — DEXTROSE MONOHYDRATE 25 G/50ML
25-50 INJECTION, SOLUTION INTRAVENOUS
Status: DISCONTINUED | OUTPATIENT
Start: 2021-12-27 | End: 2021-12-28 | Stop reason: HOSPADM

## 2021-12-27 RX ORDER — NICOTINE POLACRILEX 4 MG
15-30 LOZENGE BUCCAL
Status: DISCONTINUED | OUTPATIENT
Start: 2021-12-27 | End: 2021-12-28 | Stop reason: HOSPADM

## 2021-12-27 RX ORDER — POTASSIUM CHLORIDE 750 MG/1
20 TABLET, EXTENDED RELEASE ORAL ONCE
Status: DISCONTINUED | OUTPATIENT
Start: 2021-12-27 | End: 2021-12-28 | Stop reason: HOSPADM

## 2021-12-27 RX ORDER — POTASSIUM CHLORIDE 750 MG/1
40 TABLET, EXTENDED RELEASE ORAL ONCE
Status: DISCONTINUED | OUTPATIENT
Start: 2021-12-27 | End: 2021-12-28 | Stop reason: HOSPADM

## 2021-12-27 RX ADMIN — Medication 10000 UNITS: at 08:41

## 2021-12-27 RX ADMIN — POTASSIUM CHLORIDE 40 MEQ: 750 TABLET, EXTENDED RELEASE ORAL at 17:54

## 2021-12-27 RX ADMIN — PANCRELIPASE 3 CAPSULE: 30000; 6000; 19000 CAPSULE, DELAYED RELEASE PELLETS ORAL at 12:56

## 2021-12-27 RX ADMIN — LACTULOSE 20 G: 20 SOLUTION ORAL at 15:25

## 2021-12-27 RX ADMIN — RIFAXIMIN 550 MG: 550 TABLET ORAL at 08:39

## 2021-12-27 RX ADMIN — THIAMINE HCL TAB 100 MG 100 MG: 100 TAB at 08:37

## 2021-12-27 RX ADMIN — APIXABAN 5 MG: 5 TABLET, FILM COATED ORAL at 08:39

## 2021-12-27 RX ADMIN — LACTULOSE 30 ML: 20 SOLUTION ORAL at 20:35

## 2021-12-27 RX ADMIN — FOLIC ACID 1 MG: 1 TABLET ORAL at 08:39

## 2021-12-27 RX ADMIN — LACTULOSE 30 ML: 20 SOLUTION ORAL at 14:34

## 2021-12-27 RX ADMIN — Medication 50 MCG: at 16:25

## 2021-12-27 RX ADMIN — OMEPRAZOLE 20 MG: 20 CAPSULE, DELAYED RELEASE ORAL at 08:40

## 2021-12-27 RX ADMIN — Medication 1 TABLET: at 08:38

## 2021-12-27 RX ADMIN — FERROUS SULFATE TAB 325 MG (65 MG ELEMENTAL FE) 325 MG: 325 (65 FE) TAB at 08:39

## 2021-12-27 RX ADMIN — Medication 400 UNITS: at 13:00

## 2021-12-27 RX ADMIN — TOPIRAMATE 50 MG: 50 TABLET ORAL at 08:58

## 2021-12-27 RX ADMIN — RIFAXIMIN 550 MG: 550 TABLET ORAL at 20:35

## 2021-12-27 RX ADMIN — LACTULOSE 20 G: 20 SOLUTION ORAL at 01:33

## 2021-12-27 RX ADMIN — LACTULOSE 20 G: 20 SOLUTION ORAL at 17:54

## 2021-12-27 RX ADMIN — CIPROFLOXACIN HYDROCHLORIDE 250 MG: 250 TABLET, FILM COATED ORAL at 08:38

## 2021-12-27 RX ADMIN — LEVOTHYROXINE SODIUM 200 MCG: 0.05 TABLET ORAL at 08:39

## 2021-12-27 RX ADMIN — Medication 1 MG: at 23:42

## 2021-12-27 RX ADMIN — VENLAFAXINE 75 MG: 75 TABLET ORAL at 22:45

## 2021-12-27 RX ADMIN — Medication 1 TABLET: at 17:54

## 2021-12-27 RX ADMIN — OXYCODONE HYDROCHLORIDE AND ACETAMINOPHEN 1000 MG: 500 TABLET ORAL at 13:01

## 2021-12-27 RX ADMIN — ATORVASTATIN CALCIUM 20 MG: 20 TABLET, FILM COATED ORAL at 08:38

## 2021-12-27 RX ADMIN — LACTULOSE 20 G: 20 SOLUTION ORAL at 05:58

## 2021-12-27 RX ADMIN — PANCRELIPASE 3 CAPSULE: 30000; 6000; 19000 CAPSULE, DELAYED RELEASE PELLETS ORAL at 17:56

## 2021-12-27 RX ADMIN — POTASSIUM CHLORIDE 20 MEQ: 750 TABLET, EXTENDED RELEASE ORAL at 08:37

## 2021-12-27 RX ADMIN — LACTULOSE 20 G: 20 SOLUTION ORAL at 04:02

## 2021-12-27 RX ADMIN — POTASSIUM CHLORIDE 40 MEQ: 750 TABLET, EXTENDED RELEASE ORAL at 06:07

## 2021-12-27 RX ADMIN — SUMATRIPTAN SUCCINATE 50 MG: 50 TABLET, FILM COATED ORAL at 09:22

## 2021-12-27 RX ADMIN — LACTULOSE 30 ML: 20 SOLUTION ORAL at 08:37

## 2021-12-27 RX ADMIN — APIXABAN 5 MG: 5 TABLET, FILM COATED ORAL at 20:35

## 2021-12-27 RX ADMIN — THERA TABS 1 TABLET: TAB at 08:37

## 2021-12-27 ASSESSMENT — ACTIVITIES OF DAILY LIVING (ADL)
ADLS_ACUITY_SCORE: 13
ADLS_ACUITY_SCORE: 9
ADLS_ACUITY_SCORE: 11
ADLS_ACUITY_SCORE: 11
ADLS_ACUITY_SCORE: 13
ADLS_ACUITY_SCORE: 11
ADLS_ACUITY_SCORE: 13
ADLS_ACUITY_SCORE: 13
ADLS_ACUITY_SCORE: 11
ADLS_ACUITY_SCORE: 9
ADLS_ACUITY_SCORE: 13
ADLS_ACUITY_SCORE: 11
ADLS_ACUITY_SCORE: 11
ADLS_ACUITY_SCORE: 13
ADLS_ACUITY_SCORE: 13
ADLS_ACUITY_SCORE: 11

## 2021-12-27 ASSESSMENT — MIFFLIN-ST. JEOR: SCORE: 1279.97

## 2021-12-27 NOTE — PROGRESS NOTES
12/27/21 1000   Quick Adds   Type of Visit Initial Occupational Therapy Evaluation   Living Environment   People in home spouse   Current Living Arrangements house   Home Accessibility stairs to enter home;stairs within home   Number of Stairs, Main Entrance 3   Stair Railings, Main Entrance railings safe and in good condition   Number of Stairs, Within Home, Primary   (Flight to basement )   Stair Railings, Within Home, Primary railing on left side (ascending)   Transportation Anticipated family or friend will provide   Living Environment Comments Pt lives in a rambler style house with her . 3 SHANTI and one flight to basement. Pt reports she does not go into basement and  provides CGA for SHANTI. Pt with tub shower with shower chair.    Self-Care   Usual Activity Tolerance moderate   Current Activity Tolerance fair   Regular Exercise No   Equipment Currently Used at Home cane, straight;shower chair   Activity/Exercise/Self-Care Comment Pt reports IND with BADLs, supervision for showers.  A for heavy ADLs/IADLs    Instrumental Activities of Daily Living (IADL)   Previous Responsibilities laundry;meal prep;housekeeping   IADL Comments  A as needed/share responsibilities    Disability/Function   Hearing Difficulty or Deaf no   Wear Glasses or Blind yes   Vision Management glasses   Concentrating, Remembering or Making Decisions Difficulty no   Difficulty Communicating no   Difficulty Eating/Swallowing no   Walking or Climbing Stairs Difficulty yes   Walking or Climbing Stairs ambulation difficulty, requires equipment;stair climbing difficulty, assistance 1 person   Mobility Management [place],  A (CGA) for stairs    Dressing/Bathing Difficulty yes   Dressing/Bathing bathing difficulty, requires equipment   Dressing/Bathing Management shower chair    Toileting issues no   Doing Errands Independently Difficulty (such as shopping) yes   Errands Management  A   Fall history within last  "six months no   Change in Functional Status Since Onset of Current Illness/Injury yes   General Information   Onset of Illness/Injury or Date of Surgery 12/26/21   Referring Physician Dorothy Quinteros, PA-C   Patient/Family Therapy Goal Statement (OT) To get stronger/go home   Additional Occupational Profile Info/Pertinent History of Current Problem Per chart Susan Puckett is a 51 year old female with PMH CUETO cirrhosis c/b EV, recurrent hepatic hydrothorax, HE, 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    Existing Precautions/Restrictions fall   Limitations/Impairments safety/cognitive   Cognitive Status Examination   Orientation Status person;place   Affect/Mental Status (Cognitive) WFL   Follows Commands WFL   Cognitive Status Comments Pt oriented to person, place, and some aspects of time (\"December 27, 2022\" and thought she was taking afternoon pills)   Visual Perception   Visual Impairment/Limitations corrective lenses for reading;corrective lenses full-time   Sensory   Sensory Quick Adds No deficits were identified   Integumentary/Edema   Integumentary/Edema no deficits were identifed   Posture   Posture forward head position;protracted shoulders   Range of Motion Comprehensive   General Range of Motion bilateral upper extremity ROM WFL   Comment, General Range of Motion Pt with DVT in RUE, limited ROM with some pain   Strength Comprehensive (MMT)   General Manual Muscle Testing (MMT) Assessment upper extremity strength deficits identified   Comment, General Manual Muscle Testing (MMT) Assessment Generalized weakness    Coordination   Upper Extremity Coordination No deficits were identified   Bed Mobility   Bed Mobility supine-sit   Supine-Sit [place] (Bed Mobility) modified independence   Assistive Device (Bed Mobility) bed rails   Transfers   Transfers sit-stand transfer   Sit-Stand Transfer "   Sit-Stand [place] (Transfers) supervision;verbal cues   Balance   Balance Comments Pt ambulating 25 ft, 100ft with CGA and intermittent support of OT arm. Pt preference for pushing wheelchair with longer distance ambulation.    Clinical Impression   Criteria for Skilled Therapeutic Interventions Met (OT) yes;meets criteria;skilled treatment is necessary   OT Diagnosis Pt is below baseline for ADLs/IADLs.   OT Problem List-Impairments impacting ADL activity tolerance impaired;balance;cognition;strength   Assessment of Occupational Performance 3-5 Performance Deficits   Identified Performance Deficits bathing, home management, functional mobility   Planned Therapy Interventions (OT) ADL retraining;IADL retraining;cognition;strengthening;home program guidelines;progressive activity/exercise   Clinical Decision Making Complexity (OT) moderate complexity   Therapy Frequency (OT) 6x/week   Predicted Duration of Therapy 4 days    Risk & Benefits of therapy have been explained evaluation/treatment results reviewed;care plan/treatment goals reviewed;risks/benefits reviewed;current/potential barriers reviewed;participants included;patient;participants voiced agreement with care plan   Comment-Clinical Impression Pt presents below baseline, limited by activity tolerance and impaired ADLs/IADLs. Pt would benefit from skilled OT services to maximize IND.    OT Discharge Planning    OT Discharge Recommendation (DC Rec) Home with assist;home with home care occupational therapy   OT Rationale for DC Rec Pt is below functionla baseline, limited by activity tolerance and cognition. Anticipating pt will be safe to d/c home with 24/7 A from  and cont use of cane, shower chair. Pt would benefit from HH OT/PT to progress activity tolerance and strength for ADLs/IADLs.    OT Brief overview of current status  CGA w/gaitbelt for ambulation   Total Evaluation Time (Minutes)   Total Evaluation Time (Minutes) 10

## 2021-12-27 NOTE — PLAN OF CARE
Problem: Adult Inpatient Plan of Care  Goal: Plan of Care Review  Outcome: Improving  Goal: Patient-Specific Goal (Individualized)  Outcome: Improving  Goal: Absence of Hospital-Acquired Illness or Injury  Intervention: Identify and Manage Fall Risk  Recent Flowsheet Documentation  Taken 12/26/2021 1330 by Amy Fernando RN  Safety Promotion/Fall Prevention:    activity supervised    bed alarm on  Taken 12/26/2021 1000 by Amy Fernando RN  Safety Promotion/Fall Prevention:    activity supervised    bed alarm on    room near nurse's station  Intervention: Prevent Skin Injury  Recent Flowsheet Documentation  Taken 12/26/2021 1330 by Amy Fernando RN  Body Position: position changed independently  Taken 12/26/2021 1000 by Amy Fernando RN  Body Position: supine  Intervention: Prevent and Manage VTE (Venous Thromboembolism) Risk  Recent Flowsheet Documentation  Taken 12/26/2021 1000 by Amy Fernando RN  VTE Prevention/Management: bleeding risk assessed  Goal: Optimal Comfort and Wellbeing  Outcome: Improving   Patient was transferred to unit 5C from the ED with altered mental status.

## 2021-12-27 NOTE — PROGRESS NOTES
SPIRITUAL HEALTH SERVICES  [facility] ([place] 5C  REFERRAL SOURCE: Follow-up    Attempted to visit this morning, but pt woke and then quickly fell back to sleep.    2. 2:00pm second visit. Pt was awake and engaged in reflective conversation about her struggles        with her disease. Pt shed tears at times as she shared stories in which she felt she wasn't being        heard or believed the difficulties of her illness. Pt also has a deep sense of larisa which supports        her resiliency and using her coping skills - prayer and prayer journaling to create connection with        God and meaning and purpose in her life.          Provided another coping skill of 3 Good Things out of [employer] as part of the Gratitude         Project in resiliency.     PLAN: Will continue to follow while on 5C and will arrange journal materials for pt to use while in the hospital.      Rev. Lacy aPgan MDiv, [facility]  Staff    Pager [phone]  * [facility] remains available 24/7 for emergent requests/referrals, either by having the switchboard page the on-call  or by entering an ASAP/STAT consult in Epic (this will also page the on-call ).*

## 2021-12-27 NOTE — TELEPHONE ENCOUNTER
Patient's  called, patient currently inpatient with HE    Per , patient inconsistent with Lactulose and other meds.  He is currently off work trying to help caregive for he and ensure she takes meds, but he reports she tells her she doesn't want to be treated like a 4 year old.   reprots she is upset because inpatient team wants her to see psych consult.      Patient's family struggling financially due to time off work, lack of income coming in with missed work. Informed social work, they would like to see if any additional resources.  Also, they want to know if any additional home care services are available on discharge.      Will continue to follow and follow up after discharge.

## 2021-12-27 NOTE — PROGRESS NOTES
[facility]  Patient is currently open to home care services with [facility]. The patient is currently receiving RN services.  and home health team have been notified of patient admission. [facility] liaison will continue to follow patient during stay. If appropriate provide orders to resume home care at time of discharge.    Marge Busch RN   [facility] Care Liaison   [phone]

## 2021-12-27 NOTE — PROGRESS NOTES
"[facility]  HEPATOLOGY PROGRESS NOTE  Susan Puckett [id number]       CC: HE  SUBJECTIVE:  Improvement in mentation after resuming lactulose but she does not yet feel she is at her baseline. She denies fevers, abdominal pain, nausea, vomiting, melena or hematochezia. She reports not taking lactulose at home precipitating admit.     ROS:  A 10-point review of systems was negative.    OBJECTIVE:  VS: /74 (BP Location: Left arm)   Pulse 75   Temp 97.6  F (36.4  C) (Oral)   Resp 19   Ht 1.651 m (5' 5\")   Wt 65.4 kg (144 lb 3.2 oz)   SpO2 100%   BMI 24.00 kg/m     Date 12/27/21 0700 - 12/28/21 0659   Shift 3474-4662 7220-7100 6299-4147 24 Hour Total   INTAKE   Shift Total(mL/kg)       OUTPUT   Other 600   600   Shift Total(mL/kg) 600(9.17)   600(9.17)   Weight (kg) 65.41 65.41 65.41 65.41     General: In no acute distress, mild facial muscle wasting  Neuro: AOx3, No asterixis  Lymph: No cervical lymphadenopathy  HEENT:  Noscleral icterus, Nooral lesions  CV: . Skin warm and dry  Lungs:  Respirations even and nonlabored on room air  Abd:  Nondistended, nontender   Extrem: Noperipheral edema   Skin: trace jaundice  Psych:labile, redirectable.     MEDICATIONS:  Current Facility-Administered Medications   Medication     acetaminophen (TYLENOL) tablet 500 mg     albuterol (PROVENTIL HFA/VENTOLIN HFA) inhaler     amylase-lipase-protease (CREON 6) 7871-27435-72045 units per capsule 3 capsule     apixaban ANTICOAGULANT (ELIQUIS) tablet 5 mg     atorvastatin (LIPITOR) tablet 20 mg     calcium carbonate (TUMS) chewable tablet 500 mg     calcium citrate and vitamin D (CITRACAL) 200-250 MG-UNIT per tablet TABS 1 tablet     ciprofloxacin (CIPRO) tablet 250 mg     cyanocobalamin (VITAMIN B-12) tablet 1,000 mcg     Daily 2 GRAM acetaminophen limit, unless fulminent liver failure or transaminases greater than or equal to 300 - 400, then none     glucose gel 15-30 g    Or     dextrose 50 % injection 25-50 mL    Or     " glucagon injection 1 mg     glucose gel 15-30 g    Or     dextrose 50 % injection 25-50 mL    Or     glucagon injection 1 mg     ferrous sulfate (FEROSUL) tablet 325 mg     folic acid (FOLVITE) tablet 1 mg     [Held by provider] furosemide (LASIX) tablet 40 mg     hydrOXYzine (ATARAX) tablet 25 mg     insulin aspart (NovoLOG) injection (RAPID ACTING)     insulin aspart (NovoLOG) injection (RAPID ACTING)     insulin aspart (NovoLOG) injection (RAPID ACTING)     insulin glargine (LANTUS PEN) injection 12 Units     lactulose (CHRONULAC) solution 20 g    Or     lactulose (CHRONULAC) solution for enema prep 100 g     lactulose (CHRONULAC) solution 30 mL     levothyroxine (SYNTHROID/LEVOTHROID) tablet 200 mcg     lidocaine (LMX4) cream     lidocaine 1 % 0.1-1 mL     melatonin tablet 1 mg     metoclopramide (REGLAN) tablet 10 mg     multivitamin, therapeutic (THERA-VIT) tablet     omeprazole (priLOSEC) CR capsule 20 mg     ondansetron (ZOFRAN-ODT) ODT tab 4 mg    Or     ondansetron (ZOFRAN) injection 4 mg     Patient is already receiving anticoagulation with heparin, enoxaparin (LOVENOX), warfarin (COUMADIN)  or other anticoagulant medication     polyethylene glycol (MIRALAX) Packet 17 g     potassium chloride ER (KLOR-CON M) CR tablet 20 mEq     potassium chloride ER (KLOR-CON M) CR tablet 40 mEq     prochlorperazine (COMPAZINE) tablet 10 mg     rifaximin (XIFAXAN) tablet 550 mg     simethicone (MYLICON) chewable tablet 80 mg     sodium chloride (PF) 0.9% PF flush 3 mL     sodium chloride (PF) 0.9% PF flush 3 mL     [Held by provider] spironolactone (ALDACTONE) tablet 100 mg     SUMAtriptan (IMITREX) tablet 50 mg     thiamine (B-1) tablet 100 mg    Or     thiamine (B-1) injection 100 mg     topiramate (TOPAMAX) tablet 50 mg     traZODone (DESYREL) tablet  mg     valACYclovir (VALTREX) tablet 1,000 mg     venlafaxine (EFFEXOR) tablet 75 mg     vitamin A capsule 10,000 Units     vitamin C (ASCORBIC ACID) tablet 1,000  mg     Vitamin D3 (CHOLECALCIFEROL) tablet 50 mcg     vitamin E (TOCOPHEROL) 400 units (180 mg) capsule 400 Units       REVIEW OF LABORATORY, PATHOLOGY AND IMAGING RESULTS:  BMP  Recent Labs   Lab 12/27/21  1157 12/27/21  0816 12/27/21  0422 12/27/21  0137 12/26/21  1224 12/26/21  1008 12/26/21  0402   NA  --   --  141  --   --   --  142   POTASSIUM  --   --  2.8*  --   --  4.0 3.9   CHLORIDE  --   --  113*  --   --   --  113*   MAURI  --   --  9.0  --   --   --  9.4   CO2  --   --  19*  --   --   --  22   BUN  --   --  10  --   --   --  13   CR  --   --  0.74  --   --   --  0.77   * 242* 107* 79   < >  --  201*    < > = values in this interval not displayed.     CBC  Recent Labs   Lab 12/27/21 0422 12/26/21  0402   WBC 4.0 2.9*   RBC 2.96* 3.35*   HGB 9.7* 10.8*   HCT 29.7* 32.7*    98   MCH 32.8 32.2   MCHC 32.7 33.0   RDW 19.7* 19.8*   * 106*     INR  Recent Labs   Lab 12/27/21  0422 12/26/21  0402   INR 2.04* 1.50*     LFTs  Recent Labs   Lab 12/27/21 0422 12/26/21  0402   ALKPHOS 198* 200*   AST 72* 50*   ALT 36 32   BILITOTAL 2.1* 2.2*   PROTTOTAL 6.2* 7.5   ALBUMIN 2.6* 3.0*      PANCNo lab results found in last 7 days.   MELD-Na score: 17 at 12/27/2021  4:22 AM  MELD score: 17 at 12/27/2021  4:22 AM  Calculated from:  Serum Creatinine: 0.74 mg/dL (Using min of 1 mg/dL) at 12/27/2021  4:22 AM  Serum Sodium: 141 mmol/L (Using max of 137 mmol/L) at 12/27/2021  4:22 AM  Total Bilirubin: 2.1 mg/dL at 12/27/2021  4:22 AM  INR(ratio): 2.04 at 12/27/2021  4:22 AM  Age: 51 years      Imaging Results:  US TIPS 12/27/2021  Impression: Limited exam due to patient cooperation.  1. Patent TIPS with velocities within normal limits.  Ongoing sluggish  flow at the portal venous end of shunt, 34 cm/sec.  2. Cirrhotic configuration of the liver.  3. No ascites in all 4 quadrants.  4. Pancreas, spleen, left portal vein, and aorta were not visualized  with ultrasound.    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  She is currently listed for liver transplant with low MELD. Mentation improved with lactulose.     RECOMMENDATIONS:  1. Hepatic encephalopathy  2. Mild dehydration  - mentation improved with lactulose. Now able to take po and orientated, not yet at baseline.   - likely more sensitive for HE events due to TIPS. Continue with lactulose with goal of 3-4 BM's per day. Continue rifaximin  - infectious work up so far negative.   - she did have increase in sodium and chloride. Encouraged her to take in fluids. She had been on diuretics PTA with lasix 40 and spironolactone 100 mg daily. Imaging without pleural effusio nor ascites. Diuretics now on hold. Would recommend continuing to hold at discharge. Mild dehydration may have contributed to HE event. These can be reassessed as outpatient for restarting at lower dose.   - would not narrow TIPS at this time due to not maximizing treatment.     3. CUETO cirrhosis  4. S/p TIPS for hepatic hydrothorax  - MELD 17. Listed for liver transplant.   - US doppler 12/27 without ascites, pleural effusion or HCC. Patent TIPS. She does have apt with OP IR to evaluate TIPS. Notified IR team.   - no plans for repeat EGD with patent TIPS, no overt signs of bleeding.     Patient was discussed with attending physician, Dr. Gutierrez    Next follow up appointment: Dr. Cook 1/6    Thank you for the opportunity to be involved with  Susan MARIO [facility]. Please call with any questions or concerns.    Shelby Vicente, APRN, CNP  Inpatient Hepatology [facility]  Text Link

## 2021-12-27 NOTE — PLAN OF CARE
Problem: Adult Inpatient Plan of Care  Goal: Plan of Care Review  12/26/2021 1933 by Amy Fernando RN  Outcome: Improving  12/26/2021 1841 by Amy Fernando RN  Outcome: Improving  Goal: Patient-Specific Goal (Individualized)  12/26/2021 1933 by Amy Fernando RN  Outcome: Improving  12/26/2021 1841 by Amy Fernando RN  Outcome: Improving  Goal: Absence of Hospital-Acquired Illness or Injury  Intervention: Identify and Manage Fall Risk  Recent Flowsheet Documentation  Taken 12/26/2021 1330 by Amy Fernando RN  Safety Promotion/Fall Prevention:    activity supervised    bed alarm on  Taken 12/26/2021 1000 by Amy Fernando RN  Safety Promotion/Fall Prevention:    activity supervised    bed alarm on    room near nurse's station  Intervention: Prevent Skin Injury  Recent Flowsheet Documentation  Taken 12/26/2021 1330 by Amy Fernando RN  Body Position: position changed independently  Taken 12/26/2021 1000 by Amy Fernando RN  Body Position: supine  Intervention: Prevent and Manage VTE (Venous Thromboembolism) Risk  Recent Flowsheet Documentation  Taken 12/26/2021 1000 by Amy Fernando RN  VTE Prevention/Management: bleeding risk assessed  Goal: Optimal Comfort and Wellbeing  Outcome: Improving  Goal: Readiness for Transition of Care  Intervention: Mutually Develop Transition Plan  Recent Flowsheet Documentation  Taken 12/26/2021 1858 by Amy Fernando RN  Equipment Currently Used at Home: none  Patient was transferred from the ED to unit 5C admitted with altered mental status. Susan was obtunded this morning and unable to take medications safely. Morning meds were taken this afternoon. Vitals have been stable. Complained of right arm pain this morning. Ultrasound of the upper and lower extremities was done. Lactulose x 3 and mentation is improved. One lactulose enema and the other two Susan took orally. Disoriented to time only  and  continues to have some confusion. Chest xray done. Ate 1/2 grilled cheese, apple juice and a few bites of tomato soup. Blood sugar 134,167 and 300. Patient has an implanted insulin device. GI MD saw Susan. Large stool x 1. Cdiff culture and urine cultures were sent. Assist x 1 to the bathroom. Bed alarm is on.

## 2021-12-27 NOTE — CONSULTS
Health Psychology                                                                                                                          Bette Shaw, Ph.D., L.P [phone]  Kae Mota, Ph.D., L.P. [phone]  Haily Chiang, Ph.D. [phone]  Angeles Whitmore, Ph.D., L.P. [phone]  Mike Ansari, Ph.D., A.B.P.P., L.P. [phone]         Nayely Marinelli, Ph.D., L.P. [phone]                [facility], 3rd Floor  [address] [place] [address]       Inpatient Health Psychology Consultation      Consult received for Susan from [place] [address] Medicine Team. Chart reviewed. Susan was engaged in outpatient therapy with Dr. Gaines, licensed psychologist in 2020 and early 2021. Sessions were targeting symptoms of anxiety and depression. Last visit with Dr. Gaines was 2/10/21. She has also met with Dr. Augustin, psychiatrist, for medication management. Last visit with Dr. Augustin (11/5/2020).     Reviewed notes from current admission.  Unable to see patient today, will meet with patient on Tuesday, 12/28.    Please contact with any questions or concern.    Haily hCiang, PhD,   Clinical Health Psychologist  Ph: [phone]

## 2021-12-27 NOTE — PROGRESS NOTES
[facility]    Medicine Progress Note - Hospitalist Service, Gold 11       Date of Admission:  12/26/2021    Assessment & Plan      51 year old woman with CUETO cirrhosis, hx varices, hydrothorax s/p TIPS here with hepatic encephalopathy. Patient's  corroborates history that patient is not taking lactulose as prescribed to titrate to stool output. No clear s/s of infection at present as cause of decompensated cirrhosis.  Encephalopathy now improved with administration of lactulose.    Acute encephalopathy, likely metabolic from hepatic encephalopathy   Decompensated CUETO cirrhosis complicated by esophageal varices  Recent TIPS procedure 11/5/2021  Follows with Dr. Cook.  Presents with encephalopathy that improved rapidly with administration of lactulose.  Was not taking lactulose as frequently as should be at home.  No signs or symptoms of GI blood loss or infection.  -Appreciate hepatology consultation.  -Follow blood cultures.  -Continue lactulose, rifaximin.  -Restart diuretics on discharge.  -Continue daily meld labs.  -Has history of hepatic hydrothorax but there is no appreciable pulmonary effusion on chest x-ray this admission.  -PT/OT.    Nonocclusive DVT of right upper extremity, resolved   DVT secondary to PICC line diagnosed by ultrasound 12/6/2021.  Repeat ultrasound this admission shows complete resolution of blood clot.    -Continue apixaban x3 months.    Pancytopenia, secondary to liver disease  -Blood counts are stable.  -Continue daily CBC.    Pancreatic insufficiency  -Continue Creon.    Type 2 diabetes with long-term use of insulin   Last hemoglobin A1c 5.9% on 10/20/2021.  -Continue PTA Lantus 12 units nightly  -Continue sliding scale insulin  -Hold Januvia, resume on discharge.    Moderate malnutrition in the context of acute on chronic illness  -Continue high-protein regular diet, vitamins.    Hypothyroidism: Continue Synthroid.  Migraine  headaches: Continue Imitrex, Topamax.    Anxiety, depression: Continue venlafaxine, hydroxyzine.  Hyperlipidemia: Continue atorvastatin.     Diet: Advance Diet as Tolerated: Regular Diet Adult    DVT Prophylaxis: DOAC  Rossi Catheter: Not present  Central Lines: None  Code Status: Full Code      Disposition Plan   Expected Discharge: 12/29/2021     Anticipated discharge location: home with family;home with help/services         The patient's care was discussed with the Bedside Nurse, Patient and Gastrointestinal Consultant.    Joe Lin MD  Hospitalist Service, [address]  Securely message with the Vocera Web Console (learn more here)  Text page via [facility] Paging/Directory    Please see sign in/sign out for up to date coverage information    ______________________________________________________________________    Interval History   Patient is feeling better this morning.  Tearful at times but much more awake.  Denies abdominal pain.  Appetite is fair.  Had 2 bowel movement so far this morning.  Has a headache currently.    Data reviewed today: I reviewed all medications, new labs and imaging results over the last 24 hours. I personally reviewed no images or EKG's today.    Physical Exam   Vital Signs: Temp: 97.6  F (36.4  C) Temp src: Oral BP: 128/74 Pulse: 75   Resp: 19 SpO2: 100 % O2 Device: None (Room air)    Weight: 144 lbs 3.2 oz  Exam  Gen: awake and alert, appears comfortable, appears stated age  HEENT: NCAT, sclerae anicteric  CV: RRR, S1/S2, extremities warm and well perfused, no lower extremity edema  Pulm: normal work of breathing, lungs clear to auscultation, no crackles or wheezing  GI: Nontender, nondistended  Skin: warm, no jaundice  Neuro: Aox3, speech normal, moves all extremities symmetrically and equally  Psych: mood is good, affect is congruent      Data   Recent Labs   Lab 12/27/21  1157 12/27/21  0816 12/27/21  0422 12/26/21  1224  12/26/21  1008 12/26/21  0402   WBC  --   --  4.0  --   --  2.9*   HGB  --   --  9.7*  --   --  10.8*   MCV  --   --  100  --   --  98   PLT  --   --  118*  --   --  106*   INR  --   --  2.04*  --   --  1.50*   NA  --   --  141  --   --  142   POTASSIUM  --   --  2.8*  --  4.0 3.9   CHLORIDE  --   --  113*  --   --  113*   CO2  --   --  19*  --   --  22   BUN  --   --  10  --   --  13   CR  --   --  0.74  --   --  0.77   ANIONGAP  --   --  9  --   --  7   MAURI  --   --  9.0  --   --  9.4   * 242* 107*   < >  --  201*   ALBUMIN  --   --  2.6*  --   --  3.0*   PROTTOTAL  --   --  6.2*  --   --  7.5   BILITOTAL  --   --  2.1*  --   --  2.2*   ALKPHOS  --   --  198*  --   --  200*   ALT  --   --  36  --   --  32   AST  --   --  72*  --   --  50*    < > = values in this interval not displayed.

## 2021-12-27 NOTE — PLAN OF CARE
"0415- Writer paged on call provider Dr. Murry in regards to pt triggering [place] protocol. Message below.    \" [address] Pt triggered criteria for [place]. Score 1,1. Two PRN doses of lactulose given. No BM yet. Thank you, AJAY Mahan\"    1667- Writer received a return call from Dr. Murry. Writer updated provider on pt status and needing PRN lactulose. No new orders received at this time.     Time: 5262-8968.  Reason for admission:   VS: /75 (BP Location: Left arm)   Pulse 81   Temp 98.2  F (36.8  C) (Oral)   Resp 18   Ht 1.651 m (5' 5\")   Wt 72.6 kg (160 lb)   SpO2 99%   BMI 26.63 kg/m      Activity: A1. Gait steady. Generalized weakness.   Neuros: Alert to self. But improving. Pt is on the [place] protocl. Pt scored 1. 3 doses of lactulose given. NO BM yet after PRN doses.   Cardiac: VSS. No edema noted. Denied chest pain.   Respiratory:  LS clear. Sating mid 90's on RA. Denied cough or SOB.   GI/: Voids without difficulty. Angelique in color. Abd soft, non tender. Loose stools x1.   Diet: Regular.   Skin: jaundice.   Lines: L PIV SL.   Labs: Potassium 2.7. Pt is on the replacement protocol. Potassium 40 mEq given, next dose of potassium is at 0800. Recheck labs at 1200. Order needs to be placed depending on time of last dose.  New changes this shift/Plan: Criteria for [place] met. q2 hour assessment. 3 PRN doses of lactulose given. Continue to monitor, notify MD for changes.   Will continue to monitor & follow POC.     "

## 2021-12-27 NOTE — ED PROVIDER NOTES
ED Provider Note  [facility]      History     Chief Complaint   Patient presents with     Altered Mental Status     HPI  Susan Puckett is a 51 year old female hx of CUETO cirrhosis, hx varices, hydrothorax s/p TIPS, on liver xplant list p/w AMS.  Patient lives with  who called EMS.  Additional history not currently available and patient is significantly altered and unable to contribute.  Per EMS the patient was confused at home.  There is no certain known trauma.  Hemodynamically stable on transport with EMS.        Past Medical History  Past Medical History:   Diagnosis Date     Anemia      Anxiety      Cold sore      Depressive disorder      Diabetes (H)     Type 2 DM/No Insulin      Encephalopathy      Esophageal varices without bleeding (H)     grade I     History of blood transfusion     10/2019     History of seizure     diabetic seizure     Insomnia      Liver cirrhosis secondary to CUETO (H) 05/28/2020     Migraine      Pleural effusion      Thrombocytopenia (H)      Thyroid disease      Past Surgical History:   Procedure Laterality Date     APPENDECTOMY      1989     COLONOSCOPY      1/2020 at [facility]      ENT SURGERY  1998    tempanoplasty     GI SURGERY      EGD August 2020 at [facility]      GYN SURGERY  2010    laparoscopic ablation     IR TRANSVEN INTRAHEPATIC PORTOSYST SHUNT  11/5/2021     MAMMOPLASTY REDUCTION BILATERAL  2004     DE STAB PHELBECTOMY VARICOSE VEINS, LESS THAN 10 INCISIONS, ONE EXTREMITY  2020     RNY gastric bypass  01/2006    retoocolic, retrogastric, [facility]     TONSILLECTOMY & ADENOIDECTOMY Bilateral 1978     WISDOM TEETH EXTRACTION       No current outpatient medications on file.    Allergies   Allergen Reactions     Methylprednisolone Hives     Droperidol Anxiety     Nsaids Other (See Comments)     GI bleed.     Prednisone Anxiety, Hives and Rash     Family History  Family History   Problem Relation Age of Onset     Anesthesia Reaction  "Nephew         PONV     Bleeding Disorder No family hx of      Clotting Disorder No family hx of      Social History   Social History     Tobacco Use     Smoking status: Never Smoker     Smokeless tobacco: Never Used   Substance Use Topics     Alcohol use: Not Currently     Comment: Last drink was in 2017     Drug use: Never      Past medical history, past surgical history, medications, allergies, family history, and social history were reviewed with the patient. No additional pertinent items.       Review of Systems  unable    Physical Exam   BP: 129/84  Pulse: 94  Temp: 97.5  F (36.4  C)  Resp: 16  Height: 165.1 cm (5' 5\")  Weight: 72.6 kg (160 lb)  SpO2: 100 %  Physical Exam  GEN: Altered, unable to answer questions, but speaking. Airway protecting. Minimally agitated.    HEENT: The head is normocephalic and atraumatic.  Pupils are equal round and reactive to light. Extraocular motions are intact.  There is no facial swelling. The neck is nontender and supple.   CV: Regular rate and rhythm . 2+ radial pulses bilaterally.  PULM: Clear to auscultation bilaterally.  ABD: Soft, nontender, nondistended.   EXT: Full range of motion.  No edema.  NEURO: Cranial nerves II through XII are grossly intact and symmetric, pt does not participate in exam. Bilateral upper and lower extremities grossly show full range of motion without any focal deficits.  + asterixis. 2 beats of clonus @ bilateral feet.   PSYCH: confused, altered.     ED Course      Procedures                     Results for orders placed or performed during the hospital encounter of 12/26/21   CT Head w/o Contrast     Status: None    Narrative    EXAM: CT HEAD W/O CONTRAST  LOCATION: [facility]  DATE/TIME: 12/26/2021 5:57 AM    INDICATION: Mental status change, unknown cause  COMPARISON: None.  TECHNIQUE: Routine CT Head without IV contrast. Multiplanar reformats. Dose reduction techniques were " used.    FINDINGS:  Motion and streak artifact limits aspects of exam.    INTRACRANIAL CONTENTS: No intracranial hemorrhage, extraaxial collection, or mass effect.  No CT evidence of acute infarct. Normal parenchymal attenuation. Normal ventricles and sulci.     VISUALIZED ORBITS/SINUSES/MASTOIDS: No intraorbital abnormality. No paranasal sinus mucosal disease. No middle ear or mastoid effusion.    BONES/SOFT TISSUES: No acute abnormality.      Impression    IMPRESSION:  1.  No acute intracranial process given motion artifact.   XR Chest Port 1 View     Status: None    Narrative    EXAM: XR CHEST PORT 1 VIEW  12/26/2021 11:41 AM      HISTORY: acute encephalopathy, history of hepatic hydrothorax and  thoracentesis    COMPARISON: 12/13/2021, 11/7/2021    FINDINGS: Upright AP radiograph the chest. The trachea is midline. The  cardiac silhouette is within normal limits. No pleural effusion or  pneumothorax. Mild basilar opacities. Visualized upper abdomen notable  for TIPS stent. Scattered gas-filled loops of bowel in the upper  abdomen. Degenerative changes of the shoulders.      Impression    IMPRESSION: Mild bibasilar opacities favoring atelectasis. No  significant pleural effusion to suggest hepatic hydrothorax.    GHULAM SORIA MD         SYSTEM ID:  [id number]    Upper Extremity Venous Duplex Bilateral Port     Status: None    Narrative    EXAMINATION: DOPPLER VENOUS ULTRASOUND OF BILATERAL UPPER EXTREMTIES,  12/26/2021 12:47 PM     COMPARISON: None.    HISTORY: History of DVT and missed doses of DOAC    TECHNIQUE:  Gray-scale evaluation with compression, spectral flow, and  color Doppler assessment of the deep venous system of both upper  extremities.    FINDINGS:  Normal blood flow and waveforms are demonstrated in the internal  jugular, innominate, subclavian, and axillary veins bilaterally.      Impression    IMPRESSION: No evidence of deep venous thrombosis in either upper  extremity in the axillary,  subclavian, innominate, or internal jugular  veins.    I have personally reviewed the examination and initial interpretation  and I agree with the findings.    GHULAM SORIA MD         SYSTEM ID:  [id number]   US Lower Extremity Venous Duplex Right Port     Status: None    Narrative    EXAMINATION: DOPPLER VENOUS ULTRASOUND OF BILATERAL LOWER EXTREMITIES,  12/26/2021 12:47 PM     COMPARISON: None.    HISTORY: History of DVT and missed doses of DOAC    TECHNIQUE:  Gray-scale evaluation with compression, spectral flow and  color Doppler assessment of the deep venous system of both legs from  groin to knee, and then at the ankles.    FINDINGS:  In the right lower extremity, the common femoral, femoral, popliteal  and posterior tibial veins demonstrate normal compressibility and  blood flow.    The left common femoral vein demonstrates normal compressibility and  blood flow. The remaining left lower extremity was not imaged as  patient was combative.      Impression    IMPRESSION:  1.  No evidence of deep venous thrombosis in the right lower  extremity.  2.  The left lower extremity was not fully imaged as the patient was  combative.    I have personally reviewed the examination and initial interpretation  and I agree with the findings.    GHULAM SORIA MD         SYSTEM ID:  [id number]   Comprehensive metabolic panel     Status: Abnormal   Result Value Ref Range    Sodium 142 133 - 144 mmol/L    Potassium 3.9 3.4 - 5.3 mmol/L    Chloride 113 (H) 94 - 109 mmol/L    Carbon Dioxide (CO2) 22 20 - 32 mmol/L    Anion Gap 7 3 - 14 mmol/L    Urea Nitrogen 13 7 - 30 mg/dL    Creatinine 0.77 0.52 - 1.04 mg/dL    Calcium 9.4 8.5 - 10.1 mg/dL    Glucose 201 (H) 70 - 99 mg/dL    Alkaline Phosphatase 200 (H) 40 - 150 U/L    AST 50 (H) 0 - 45 U/L    ALT 32 0 - 50 U/L    Protein Total 7.5 6.8 - 8.8 g/dL    Albumin 3.0 (L) 3.4 - 5.0 g/dL    Bilirubin Total 2.2 (H) 0.2 - 1.3 mg/dL    GFR Estimate >90 >60 mL/min/1.73m2   Lactic acid  whole blood     Status: Normal   Result Value Ref Range    Lactic Acid 1.7 0.7 - 2.0 mmol/L   Troponin I     Status: Normal   Result Value Ref Range    Troponin I High Sensitivity 6 <54 ng/L   INR     Status: Abnormal   Result Value Ref Range    INR 1.50 (H) 0.85 - 1.15   Partial thromboplastin time     Status: Normal   Result Value Ref Range    aPTT 38 22 - 38 Seconds   UA with Microscopic reflex to Culture     Status: Abnormal    Specimen: Urine, Midstream   Result Value Ref Range    Color Urine Yellow Colorless, Straw, Light Yellow, Yellow    Appearance Urine Slightly Cloudy (A) Clear    Glucose Urine Negative Negative mg/dL    Bilirubin Urine Negative Negative    Ketones Urine Trace (A) Negative mg/dL    Specific Gravity Urine 1.019 1.003 - 1.035    Blood Urine Negative Negative    pH Urine 7.0 5.0 - 7.0    Protein Albumin Urine Negative Negative mg/dL    Urobilinogen Urine Normal Normal, 2.0 mg/dL    Nitrite Urine Negative Negative    Leukocyte Esterase Urine Negative Negative    Mucus Urine Present (A) None Seen /LPF    RBC Urine 1 <=2 /HPF    WBC Urine 1 <=5 /HPF    Squamous Epithelials Urine 5 (H) <=1 /HPF    Hyaline Casts Urine 4 (H) <=2 /LPF    Narrative    Urine Culture not indicated   Ammonia     Status: Abnormal   Result Value Ref Range    Ammonia 144 (HH) 10 - 50 umol/L   Asymptomatic COVID-19 Virus (Coronavirus) by PCR Nasopharyngeal     Status: Normal    Specimen: Nasopharyngeal; Swab   Result Value Ref Range    SARS CoV2 PCR Negative Negative, Testing sent to reference lab. Results will be returned via unsolicited result    Narrative    Testing was performed using the Xpert Xpress SARS-CoV-2 Assay on the  Cepheid Gene-Xpert Instrument Systems. Additional information about  this Emergency Use Authorization (EUA) assay can be found via the Lab  Guide. This test should be ordered for the detection of SARS-CoV-2 in  individuals who meet SARS-CoV-2 clinical and/or epidemiological  criteria. Test  performance is unknown in asymptomatic patients. This  test is for in vitro diagnostic use under the FDA EUA for  laboratories certified under CLIA to perform high complexity testing.  This test has not been FDA cleared or approved. A negative result  does not rule out the presence of PCR inhibitors in the specimen or  target RNA in concentration below the limit of detection for the  assay. The possibility of a false negative should be considered if  the patient's recent exposure or clinical presentation suggests  COVID-19. This test was validated by the [facility]. This laboratory is certified under  the Clinical Laboratory Improvement Amendments of 1988 (CLIA-88) as  qualified to perform high complexity laboratory testing.     CBC with platelets and differential     Status: Abnormal   Result Value Ref Range    WBC Count 2.9 (L) 4.0 - 11.0 10e3/uL    RBC Count 3.35 (L) 3.80 - 5.20 10e6/uL    Hemoglobin 10.8 (L) 11.7 - 15.7 g/dL    Hematocrit 32.7 (L) 35.0 - 47.0 %    MCV 98 78 - 100 fL    MCH 32.2 26.5 - 33.0 pg    MCHC 33.0 31.5 - 36.5 g/dL    RDW 19.8 (H) 10.0 - 15.0 %    Platelet Count 106 (L) 150 - 450 10e3/uL    % Neutrophils 67 %    % Lymphocytes 22 %    % Monocytes 7 %    % Eosinophils 3 %    % Basophils 1 %    % Immature Granulocytes 0 %    NRBCs per 100 WBC 0 <1 /100    Absolute Neutrophils 2.0 1.6 - 8.3 10e3/uL    Absolute Lymphocytes 0.7 (L) 0.8 - 5.3 10e3/uL    Absolute Monocytes 0.2 0.0 - 1.3 10e3/uL    Absolute Eosinophils 0.1 0.0 - 0.7 10e3/uL    Absolute Basophils 0.0 0.0 - 0.2 10e3/uL    Absolute Immature Granulocytes 0.0 <=0.4 10e3/uL    Absolute NRBCs 0.0 10e3/uL   Hemoglobin A1c     Status: Normal   Result Value Ref Range    Hemoglobin A1C 5.4 0.0 - 5.6 %   TSH with free T4 reflex     Status: Abnormal   Result Value Ref Range    TSH 0.34 (L) 0.40 - 4.00 mU/L   Potassium     Status: Normal   Result Value Ref Range    Potassium 4.0 3.4 - 5.3 mmol/L    Magnesium     Status: Normal   Result Value Ref Range    Magnesium 2.0 1.6 - 2.3 mg/dL   Phosphorus     Status: Normal   Result Value Ref Range    Phosphorus 3.2 2.5 - 4.5 mg/dL   Extra Purple Top Tube     Status: None   Result Value Ref Range    Hold Specimen JIC    T4 free     Status: Normal   Result Value Ref Range    Free T4 1.33 0.76 - 1.46 ng/dL   Glucose by meter     Status: Abnormal   Result Value Ref Range    GLUCOSE BY METER POCT 134 (H) 70 - 99 mg/dL   Glucose by meter     Status: Abnormal   Result Value Ref Range    GLUCOSE BY METER POCT 167 (H) 70 - 99 mg/dL   Glucose by meter     Status: Abnormal   Result Value Ref Range    GLUCOSE BY METER POCT 300 (H) 70 - 99 mg/dL   Glucose by meter     Status: Abnormal   Result Value Ref Range    GLUCOSE BY METER POCT 181 (H) 70 - 99 mg/dL   EKG 12-lead, tracing only     Status: None (Preliminary result)   Result Value Ref Range    Systolic Blood Pressure  mmHg    Diastolic Blood Pressure  mmHg    Ventricular Rate 74 BPM    Atrial Rate 74 BPM    AK Interval 136 ms    QRS Duration 80 ms     ms    QTc 486 ms    P Axis 33 degrees    R AXIS -24 degrees    T Axis -4 degrees    Interpretation ECG       Sinus rhythm  Moderate voltage criteria for LVH, may be normal variant  Nonspecific T wave abnormality  Prolonged QT  Abnormal ECG  When compared with ECG of 06-DEC-2021 21:00,  Nonspecific T wave abnormality now evident in Anterolateral leads     GI Hepatology Adult IP Consult: Patient to be seen: Routine within 24 hrs; Call back #: [phone], gold 11; HE, cirrhosis; Consultant may enter orders: Yes; Requesting provider? Hospitalist (if different from attending physician); Name: Dorothy Ge...     Status: None ()    Raymundo Jose MD     12/26/2021  7:56 PM      GASTROENTEROLOGY CONSULTATION    Date of Admission:  12/26/2021          ASSESSMENT AND RECOMMENDATIONS:   51 year old female with CUETO cirrhosis listed for liver   transplant  with outpatient MELD-Na of 12, obesity s/p remote   RYGB, DM type 2, hypothyroidism, pancreatic insufficiency on   Creon, who is admitted for hepatic encephalopathy.     # CUETO cirrhosis, MELD-Na of  14   # Hepatic encephalopathy  Decompensated disease, based on the presence of hepatic   encephalopathy. Her prior main issue was fluid retention   including recurrent hepatic hydrothorax. Underwent recent TIPS   which likely precipitate hepatic encephalopathy. She manages her   own medications, with some concerns for medication compliance   while encephalopathic. Will rule out infection. Will also obtain   TIPS ultrasound for size of TIPS and will consider if she would   be benefit from decreasing her TIPS size for encephalopathy.     Etiology: CUETO   Hepatic encephalopathy: precipitated by recent TIPS, missed   medication  Ascites: Noted on examination/ultrasound 11/6/21, pending US this   admission  No prior SBP.   TIPSS: Done 11/5/21 for recurrent hepatic hydrothorax  Esophageal/Gastric varices: Last EGD was 8/25/2020 with EV grade   1  Hepatocellular carcinoma: Last CT 11/6/21   Transplant: Listed         Blood type A+  Coagulopathy: INR 1.50  Thrombocytopenia: 106    RECOMMENDATIONS:  -- Continue Rifaximin 550 mg BID  -- Lactulose PO, titrate to 3-4 BMs per day. If unable to   tolerate via oral, will need NG placement. Rectal lactulose is   not much effective.  -- Monitor transaminases, bilirubin, INR  -- If has ascites on ultrasound, please obtain diagnostic with   cell count, gram stain and culture, protein and albumin, as well   as cytology  -- High protein diet. Recommend multiple meals during the day,   Snacks and shakes during the day/night   -- Please obtain TIPS ultrasound for TIPS size   -- Hold diuretics      Gastroenterology follow up recommendations: TBD, Dr. Cook   outpatient    Thank you for involving us in this patient's care. Please do not   hesitate to contact the GI service with any questions or  "  concerns.     Patient care plan discussed with Dr. Carrero, GI staff physician.    Erik Ott MD  GI fellow  Page [phone]          Chief Complaint:   We were asked by Dorothy Quinteros of Medicine to evaluate this   patient with cirrhosis  History is obtained from the medical record.          History of Present Illness:   51 year old female with PMH CUETO cirrhosis listed for liver   transplant (MELD-Na 12) c/b EV, recurrent hepatic hydrothorax,   HE, RYGB s/p TIPS 11/5/21, DMII, hypothyroid, pancreatic   insufficiency, depression admitted on 12/26/2021 with acute   encephalopathy suspected to be HE. GI consulted for cirrhosis   management.    Patient was somnolent and is unable to provide any history.   Patient was found confused at home by the . Reported   patient has been managing her own medication, and has not been   taking lactulose. No fever. At baseline her  helps with   ADLs due to her low energy. He notes her being \"pereyra\" at times.     She is listed for liver transplant, following with Dr. Cook.           Past Medical History:   Reviewed and edited as appropriate  Past Medical History:   Diagnosis Date     Anemia      Anxiety      Cold sore      Depressive disorder      Diabetes (H)     Type 2 DM/No Insulin      Encephalopathy      Esophageal varices without bleeding (H)     grade I     History of blood transfusion     10/2019     History of seizure     diabetic seizure     Insomnia      Liver cirrhosis secondary to CUETO (H) 05/28/2020     Migraine      Pleural effusion      Thrombocytopenia (H)      Thyroid disease             Past Surgical History:   Reviewed and edited as appropriate   Past Surgical History:   Procedure Laterality Date     APPENDECTOMY      1989     COLONOSCOPY      1/2020 at [facility]      ENT SURGERY  1998    tempanoplasty     GI SURGERY      EGD August 2020 at [facility]      GYN SURGERY  2010    laparoscopic ablation     IR TRANSVEN INTRAHEPATIC PORTOSYST " SHUNT  11/5/2021     MAMMOPLASTY REDUCTION BILATERAL  2004     MA STAB PHELBECTOMY VARICOSE VEINS, LESS THAN 10 INCISIONS, ONE   EXTREMITY  2020     RNY gastric bypass  01/2006    retoocolic, retrogastric, [facility]     TONSILLECTOMY & ADENOIDECTOMY Bilateral 1978     WISDOM TEETH EXTRACTION              Previous Endoscopy:   No results found for this or any previous visit.         Social History:   Reviewed and edited as appropriate  Social History     Socioeconomic History     Marital status:      Spouse name: Not on file     Number of children: Not on file     Years of education: Not on file     Highest education level: Bachelor's degree (e.g., BA, AB, BS)   Occupational History     Not on file   Tobacco Use     Smoking status: Never Smoker     Smokeless tobacco: Never Used   Substance and Sexual Activity     Alcohol use: Not Currently     Comment: Last drink was in 2017     Drug use: Never     Sexual activity: Not on file   Other Topics Concern     Parent/sibling w/ CABG, MI or angioplasty before 65F 55M? Not   Asked   Social History Narrative     Not on file     Social Determinants of Health     Financial Resource Strain: Not on file   Food Insecurity: No Food Insecurity     Worried About Running Out of Food in the Last Year: Never true     Ran Out of Food in the Last Year: Never true   Transportation Needs: No Transportation Needs     Lack of Transportation (Medical): No     Lack of Transportation (Non-Medical): No   Physical Activity: Not on file   Stress: Not on file   Social Connections: Not on file   Intimate Partner Violence: Not on file   Housing Stability: Not on file            Family History:   Reviewed and edited as appropriate  No known history of gastrointestinal/liver disease or    gastrointestinal malignancies       Allergies:   Reviewed and edited as appropriate     Allergies   Allergen Reactions     Methylprednisolone Hives     Droperidol Anxiety     Nsaids Other (See Comments)      GI bleed.     Prednisone Anxiety, Hives and Rash            Medications:     Medications Prior to Admission   Medication Sig Dispense Refill Last Dose     acetaminophen (TYLENOL) 500 MG tablet Take 1 tablet (500 mg) by   mouth every 6 hours as needed for mild pain        acetone urine (KETOSTIX) test strip 1 strip        albuterol (PROAIR HFA/PROVENTIL HFA/VENTOLIN HFA) 108 (90 Base)   MCG/ACT inhaler Inhale 1-2 puffs into the lungs (Patient not   taking: Reported on 11/16/2021)        amylase-lipase-protease (CREON 6) [id number] units CPEP   Take 3 capsules by mouth 3 times daily (with meals)        Apixaban Starter Pack (ELIQUIS DVT/PE STARTER PACK) 5 MG TBPK   Take 10 mg by mouth 2 times daily for 7 days, THEN 5 mg 2 times   daily for 23 days. 1 each 0      Apixaban Starter Pack (ELIQUIS DVT/PE STARTER PACK) 5 MG TBPK   Take 10 mg by mouth 2 times daily for 7 days, THEN 5 mg 2 times   daily for 23 days. 1 each 0      atorvastatin (LIPITOR) 20 MG tablet Take 20 mg by mouth every   morning         calcium carbonate (TUMS) 500 MG chewable tablet Take 1 tablet   by mouth daily as needed for heartburn         calcium citrate-vitamin D (CITRACAL W/D) 250-100 MG-UNIT tablet   Take 2 tablets by mouth 2 times daily (with meals) (Patient   taking differently: Take 1 tablet by mouth 2 times daily (with   meals) ) 336 tablet 3      Continuous Blood Gluc  (DEXCOM G6 ) LUNA Use as   directed for continuous glucose monitoring.        Continuous Blood Gluc Sensor (DEXCOM G6 SENSOR) MISC Use as   directed for continuous glucose monitoring.  Change sensor every   10 days.        Continuous Blood Gluc Transmit (DEXCOM G6 TRANSMITTER) MISC Use   as directed for continuous glucose monitoring.  Change every 3   months.        cyanocobalamin (VITAMIN B-12) 1000 MCG tablet Take 1,000 mcg by   mouth every 7 days         Ferrous Sulfate (IRON) 325 (65 FE) MG tablet Take 1 tablet by   mouth every morning         folic  acid (FOLVITE) 1 MG tablet Take 1 mg by mouth every   morning         furosemide (LASIX) 40 MG tablet Take 1 tablet (40 mg) by mouth   daily 90 tablet 3      hydrOXYzine (ATARAX) 25 MG tablet Take 25 mg by mouth 3 times   daily as needed for anxiety        insulin aspart (NOVOLOG FLEXPEN) 100 UNIT/ML pen Inject   1u/50>200 every 4 hours as needed for high blood glucose. Up to   20 units daily.  Indications: Diabetes Mellitus        insulin glargine (LANTUS PEN) 100 UNIT/ML pen Inject 12 Units   Subcutaneous every morning Takes around 12noon        insulin pen needle (BD GRISEL U/F) 32G X 4 MM miscellaneous   Inject 1 each Subcutaneous        lactulose (CHRONULAC) 10 GM/15ML solution TAKE 30 MLS BY MOUTH   3 TIMES DAILY (Patient taking differently: 3 times daily ) 3784   mL 5      levothyroxine (SYNTHROID/LEVOTHROID) 200 MCG tablet Take 200   mcg by mouth        metoclopramide (REGLAN) 10 MG tablet Take 10 mg by mouth        Multiple Vitamin (MULTIVITAMIN PO) Take 2 tablets by mouth   every morning         multivitamin (DEKAS ESSENTIAL) capsule Take 1 capsule by mouth   daily         omeprazole (PRILOSEC) 20 MG DR capsule Take 1 capsule (20 mg)   by mouth daily 30 capsule 0      ondansetron (ZOFRAN-ODT) 4 MG ODT tab Place 1 tablet (4 mg)   under the tongue every 6 hours as needed for nausea 120 tablet 1        oxyCODONE (ROXICODONE) 5 MG tablet Take 1 tablet (5 mg) by   mouth every 6 hours as needed for pain 12 tablet 0      polyethylene glycol (MIRALAX) 17 g packet Take 1 packet by   mouth daily as needed for constipation        prochlorperazine (COMPAZINE) 10 MG tablet Take 1 tablet (10 mg)   by mouth every 6 hours as needed for nausea or vomiting 120   tablet 1      rifampin (RIFADIN) 300 MG capsule TAKE 1 CAPSULE BY MOUTH EVERY   DAY (Patient taking differently: every evening ) 90 capsule 3      rifaximin (XIFAXAN) 550 MG TABS tablet Take 1 tablet (550 mg)   by mouth 2 times daily 60 tablet 11      Rimegepant  "Sulfate 75 MG TBDP Take 75 mg by mouth        simethicone (MYLICON) 80 MG chewable tablet Take 80 mg by mouth   every 6 hours as needed for flatulence or cramping        sitagliptin (JANUVIA) 100 MG tablet Take 100 mg by mouth every   morning         spironolactone (ALDACTONE) 100 MG tablet Take 1 tablet (100 mg)   by mouth daily 30 tablet 1      SUMAtriptan (IMITREX) 50 MG tablet Take 50 mg by mouth at onset   of headache for migraine         topiramate (TOPAMAX) 50 MG tablet Take 50 mg by mouth daily           traZODone (DESYREL) 100 MG tablet Take  mg by mouth   nightly as needed for sleep        valACYclovir (VALTREX) 1000 mg tablet Take 1,000 mg by mouth   daily as needed (at onset of cold sore)         venlafaxine (EFFEXOR) 75 MG tablet Take 1 tablet (75 mg) by   mouth At Bedtime 30 tablet 0      vitamin A 3 MG (87107 UNITS) capsule Take 10,000 Units by mouth   every morning         vitamin C (ASCORBIC ACID) 1000 MG TABS         vitamin D3 (CHOLECALCIFEROL) 50 mcg (2000 units) tablet Take 1   tablet (50 mcg) by mouth daily 90 tablet 3      vitamin D3 (CHOLECALCIFEROL) 50 mcg (2000 units) tablet Take 1   tablet (50 mcg) by mouth daily (Patient taking differently: Take   1 tablet by mouth every morning ) 90 tablet 3      vitamin E (TOCOPHEROL) 400 units (180 mg) capsule Take 400   Units by mouth every morning                 Review of Systems:   Unable to review.         Physical Exam:   /79 (BP Location: Right arm)   Pulse 64   Temp 97.6  F   (36.4  C) (Axillary)   Resp 16   Ht 1.651 m (5' 5\")   Wt 72.6   kg (160 lb)   SpO2 100%   BMI 26.63 kg/m    Wt:   Wt Readings from Last 2 Encounters:   12/26/21 72.6 kg (160 lb)   12/13/21 71.7 kg (158 lb)      Constitutional: somnolent, awake with voice, not following   command, not in acute distress  Eyes: Sclera anicteric/injected  Ears/nose/mouth/throat: mucus membranes moist, hearing intact  Neck: supple, thyroid normal size  CV: No " edema  Respiratory: Unlabored breathing  Lymph: No axillary, submandibular, supraclavicular or inguinal   lymphadenopathy  Abdomen: Non-distended, nontender, no peritoneal signs  Skin: warm, perfused, no jaundice  Neuro: AAO x 3, + asterixis         Data:   Labs and imaging below were independently reviewed and   interpreted    BMP  Recent Labs   Lab 12/26/21  1008 12/26/21  0402   NA  --  142   POTASSIUM 4.0 3.9   CHLORIDE  --  113*   MAURI  --  9.4   CO2  --  22   BUN  --  13   CR  --  0.77   GLC  --  201*     CBC  Recent Labs   Lab 12/26/21  0402   WBC 2.9*   RBC 3.35*   HGB 10.8*   HCT 32.7*   MCV 98   MCH 32.2   MCHC 33.0   RDW 19.8*   *     INR  Recent Labs   Lab 12/26/21  0402   INR 1.50*     LFTs  Recent Labs   Lab 12/26/21  0402   ALKPHOS 200*   AST 50*   ALT 32   BILITOTAL 2.2*   PROTTOTAL 7.5   ALBUMIN 3.0*      PANCNo lab results found in last 7 days.    Imaging: no abdominal imaging at this time    Attestation:  This patient has been seen and evaluated by me, Raymundo Gutierrez.  Discussed with the house staff team or   resident(s) and agree with the findings and plan in this note.      Clostridium difficile toxin B PCR     Status: Normal    Specimen: Per Rectum; Stool   Result Value Ref Range    C Difficile Toxin B by PCR Negative Negative    Narrative    The [employer] Xpert C. difficile Assay, performed on the [employer] GeneXpert  Instrument Systems, is a qualitative in vitro diagnostic test for rapid detection of toxin B gene sequences from unformed (liquid or soft) stool specimens collected from patients suspected of having Clostridioides difficile infection (CDI). The test utilizes automated real-time polymerase chain reaction (PCR) to detect toxin gene sequences associated with toxin producing C. difficile. The Xpert C. difficile Assay is intended as an aid in the diagnosis of CDI.   CBC with platelets differential     Status: Abnormal    Narrative    The following orders were created  for panel order CBC with platelets differential.  Procedure                               Abnormality         Status                     ---------                               -----------         ------                     CBC with platelets and d...[[id number]]  Abnormal            Final result                 Please view results for these tests on the individual orders.   Extra Tube ([place] Draw)     Status: None    Narrative    The following orders were created for panel order Extra Tube ([place] Draw).  Procedure                               Abnormality         Status                     ---------                               -----------         ------                     Extra Purple Top Tube[[id number]]                            Final result                 Please view results for these tests on the individual orders.     Medications   acetaminophen (TYLENOL) tablet 500 mg (has no administration in time range)   albuterol (PROVENTIL HFA/VENTOLIN HFA) inhaler (has no administration in time range)   amylase-lipase-protease (CREON 6) [phone] units per capsule 3 capsule ( Oral Automatically Held 1/1/22 1800)   apixaban ANTICOAGULANT (ELIQUIS) tablet 5 mg (5 mg Oral Given 12/26/21 2052)   atorvastatin (LIPITOR) tablet 20 mg (20 mg Oral Given 12/26/21 1343)   calcium carbonate (TUMS) chewable tablet 500 mg (has no administration in time range)   cyanocobalamin (VITAMIN B-12) tablet 1,000 mcg (1,000 mcg Oral Given 12/26/21 1513)   ferrous sulfate (FEROSUL) tablet 325 mg (325 mg Oral Given 12/26/21 1516)   folic acid (FOLVITE) tablet 1 mg (1 mg Oral Given 12/26/21 1514)   furosemide (LASIX) tablet 40 mg ( Oral Automatically Held 1/1/22 0800)   hydrOXYzine (ATARAX) tablet 25 mg (has no administration in time range)   lactulose (CHRONULAC) solution 30 mL (30 mLs Oral Given 12/26/21 2052)   levothyroxine (SYNTHROID/LEVOTHROID) tablet 200 mcg (has no administration in time range)   metoclopramide (REGLAN)  tablet 10 mg (has no administration in time range)   multivitamin, therapeutic (THERA-VIT) tablet (1 tablet Oral Given 12/26/21 1513)   omeprazole (priLOSEC) CR capsule 20 mg (20 mg Oral Given 12/26/21 1745)   polyethylene glycol (MIRALAX) Packet 17 g (has no administration in time range)   prochlorperazine (COMPAZINE) tablet 10 mg (has no administration in time range)   rifaximin (XIFAXAN) tablet 550 mg (550 mg Oral Given 12/26/21 2052)   Rimegepant Sulfate TBDP 75 mg (75 mg Oral Not Given 12/26/21 1744)   simethicone (MYLICON) chewable tablet 80 mg (has no administration in time range)   sitagliptin (JANUVIA) tablet 100 mg ( Oral Automatically Held 1/1/22 0800)   spironolactone (ALDACTONE) tablet 100 mg ( Oral Automatically Held 1/1/22 0800)   SUMAtriptan (IMITREX) tablet 50 mg (has no administration in time range)   topiramate (TOPAMAX) tablet 50 mg (50 mg Oral Given 12/26/21 1515)   traZODone (DESYREL) tablet  mg (has no administration in time range)   valACYclovir (VALTREX) tablet 1,000 mg (has no administration in time range)   venlafaxine (EFFEXOR) tablet 75 mg (75 mg Oral Given 12/26/21 2157)   vitamin A capsule 10,000 Units (10,000 Units Oral Given 12/26/21 1517)   vitamin C (ASCORBIC ACID) tablet 1,000 mg ( Oral Automatically Held 1/1/22 0800)   Vitamin D3 (CHOLECALCIFEROL) tablet 50 mcg ( Oral Automatically Held 1/1/22 0800)   vitamin E (TOCOPHEROL) 400 units (180 mg) capsule 400 Units ( Oral Automatically Held 1/1/22 0800)   lidocaine 1 % 0.1-1 mL (has no administration in time range)   lidocaine (LMX4) cream (has no administration in time range)   sodium chloride (PF) 0.9% PF flush 3 mL (3 mLs Intracatheter Given 12/26/21 1700)   sodium chloride (PF) 0.9% PF flush 3 mL (has no administration in time range)   melatonin tablet 1 mg (has no administration in time range)   Patient is already receiving anticoagulation with heparin, enoxaparin (LOVENOX), warfarin (COUMADIN)  or other anticoagulant  medication (has no administration in time range)   ondansetron (ZOFRAN-ODT) ODT tab 4 mg (has no administration in time range)     Or   ondansetron (ZOFRAN) injection 4 mg (has no administration in time range)   glucose gel 15-30 g (has no administration in time range)     Or   dextrose 50 % injection 25-50 mL (has no administration in time range)     Or   glucagon injection 1 mg (has no administration in time range)   thiamine (B-1) tablet 100 mg (100 mg Oral Given 12/26/21 1344)     Or   thiamine (B-1) injection 100 mg ( Intramuscular See Alternative 12/26/21 1344)   Daily 2 GRAM acetaminophen limit, unless fulminent liver failure or transaminases greater than or equal to 300 - 400, then none (has no administration in time range)   lactulose (CHRONULAC) solution 20 g (20 g Oral or NG Tube Given 12/26/21 1539)     Or   lactulose (CHRONULAC) solution for enema prep 100 g ( Rectal See Alternative 12/26/21 1539)   ciprofloxacin (CIPRO) tablet 250 mg (has no administration in time range)   insulin glargine (LANTUS PEN) injection 6 Units (6 Units Subcutaneous Given 12/26/21 2157)   calcium citrate and vitamin D (CITRACAL) 200-250 MG-UNIT per tablet TABS 1 tablet (1 tablet Oral Given 12/26/21 1739)   insulin aspart (NovoLOG) injection (RAPID ACTING) (1 Units Subcutaneous Not Given 12/26/21 2143)   lactulose (CEPHULAC) Packet 30 g (30 g Oral Given 12/26/21 0436)   OLANZapine (zyPREXA) 10 MG injection (5 mg Intramuscular Given 12/26/21 0510)        Assessments & Plan (with Medical Decision Making)   Hx of Du cirrosis, TIPS as noted with AMS and asterixis consistent with likely HE.   DDX includes ICH (pt anticoagulated) spont or traumatic, UTI, sepsis, intoxication, toxic metabolic etiologies, uremia, HRS, among other causes.     D/t high susp for HE, pt received lactulose 30mg and wsa able to complete.     AMS with unknown trauma and AC, CTh needed. Given olanzapine for agitation with good response and CT head neg.      Labs notable for NH3- 144, Cr nl, wbc 2.9, hgb and plat stable. Lactic 1.4  INR 1.5  covid neg.   Admitted to IM for further management of suspected HE         I have reviewed the nursing notes. I have reviewed the findings, diagnosis, plan and need for follow up with the patient.    Current Discharge Medication List          Final diagnoses:   Altered mental status, unspecified altered mental status type   Hepatic encephalopathy (H)       --  Acosta Hall MD    [facility] [address] [facility]  12/26/2021     Acosta Hall MD  12/27/21 0045

## 2021-12-27 NOTE — CONSULTS
Care Management Initial Consult    General Information  Assessment completed with: Spouse or significant other,  (Roly)  Type of CM/SW Visit: Initial Assessment    Primary Care Provider verified and updated as needed: Yes   Readmission within the last 30 days: other (see comments)   Return Category: Exacerbation of disease  Reason for Consult: discharge planning  Advance Care Planning: Advance Care Planning Reviewed: education/resources on health care directives provided   (Roly is her NOK)       Communication Assessment  Patient's communication style: spoken language (English or Bilingual)    Hearing Difficulty or Deaf: no   Wear Glasses or Blind: yes    Cognitive  Cognitive/Neuro/Behavioral: .WDL except,orientation  Level of Consciousness: confused  Arousal Level: opens eyes spontaneously  Orientation: disoriented to,situation,place,person  Mood/Behavior: calm,cooperative  Best Language: 0 - No aphasia  Speech: slow,clear    Living Environment:   People in home: spouse   (Roly)  Current living Arrangements: house      Able to return to prior arrangements: yes       Family/Social Support:  Care provided by: spouse/significant other  Provides care for: no one  Marital Status:      (Roly)       Description of Support System: Supportive    Support Assessment: Caregiver experiencing high stress    Current Resources:   Patient receiving home care services: Yes  Skilled Home Care Services: Skilled Nursing  Community Resources:    Equipment currently used at home: cane, straight,shower chair  Supplies currently used at home:      Employment/Financial:  Employment Status: disabled,employed full-time,other (see comments) (Receiving long term disability and SSDI)        Financial Concerns: other (see comments)   Referral to Financial Counselor: No  Finance Comments:  (Please see below and previous  notes by CLARISSE Coronado)    Lifestyle & Psychosocial Needs:  Social Determinants of Health     Tobacco Use:  Low Risk      Smoking Tobacco Use: Never Smoker     Smokeless Tobacco Use: Never Used   Alcohol Use: Not At Risk     Frequency of Alcohol Consumption: Never     Average Number of Drinks: Not on file     Frequency of Binge Drinking: Never   Financial Resource Strain: Not on file   Food Insecurity: No Food Insecurity     Worried About Running Out of Food in the Last Year: Never true     Ran Out of Food in the Last Year: Never true   Transportation Needs: No Transportation Needs     Lack of Transportation (Medical): No     Lack of Transportation (Non-Medical): No   Physical Activity: Not on file   Stress: Not on file   Social Connections: Not on file   Intimate Partner Violence: Not on file   Depression: Not at risk     PHQ-2 Score: 2   Housing Stability: Not on file       Functional Status:  Prior to admission patient needed assistance:          Mental Health Status:  Mental Health Status: Current Concern  Mental Health Management: Individual Therapy    Chemical Dependency Status:  Chemical Dependency Status: No Current Concerns             Values/Beliefs:  Spiritual, Cultural Beliefs, [employer] Practices, Values that affect care: yes               Additional Information:  I called Susan to update psychosocial assessment and provide emotional support. Susan was tearful during conversation and I provided supportive counseling. She voiced that she feels overwhelmed that it has been a long transplant journey. She indicated that she has not been taking her lactulose at home. I inquired more information regarding this. She indicated that she feels overwhelmed and that she knows she should take it. We talked about strategies for taking it such as setting timers or adding an incentive at home. Susan indicated she got a new neck warmer at home and is going to try to use her neck warmer when she takes her lactulose to great more of a routine.     Susan would like to set up PCA services and has not done so. She reports she  struggles taking initiative for setting things up. We talked about different strategies such as making lists and taking one thing at a time.    We called [facility] to set up [employer] [place] for PCA services. We attempted to call 2x. Second time, we started referral via phone and Susan provided permission for [facility] To call this writer back to complete referral. Sancho called this writer back and we completed referral for [employer] assessment. Referral will be sent to intake and intake will reach out to Susan to schedule an assessment. I sent Susan and Roly information re:conversation         Juanita Wells [facility]

## 2021-12-28 ENCOUNTER — APPOINTMENT (OUTPATIENT)
Dept: OCCUPATIONAL THERAPY | Facility: CLINIC | Age: 51
DRG: 442 | End: 2021-12-28
Payer: COMMERCIAL

## 2021-12-28 ENCOUNTER — COMMITTEE REVIEW (OUTPATIENT)
Dept: TRANSPLANT | Facility: CLINIC | Age: 51
End: 2021-12-28
Payer: COMMERCIAL

## 2021-12-28 VITALS
HEART RATE: 69 BPM | BODY MASS INDEX: 24.03 KG/M2 | RESPIRATION RATE: 16 BRPM | HEIGHT: 65 IN | DIASTOLIC BLOOD PRESSURE: 78 MMHG | WEIGHT: 144.2 LBS | SYSTOLIC BLOOD PRESSURE: 124 MMHG | TEMPERATURE: 97.6 F | OXYGEN SATURATION: 100 %

## 2021-12-28 LAB
ALBUMIN SERPL-MCNC: 2.3 G/DL (ref 3.4–5)
ALP SERPL-CCNC: 186 U/L (ref 40–150)
ALT SERPL W P-5'-P-CCNC: 39 U/L (ref 0–50)
ANION GAP SERPL CALCULATED.3IONS-SCNC: 6 MMOL/L (ref 3–14)
AST SERPL W P-5'-P-CCNC: 72 U/L (ref 0–45)
BILIRUB SERPL-MCNC: 1.5 MG/DL (ref 0.2–1.3)
BUN SERPL-MCNC: 6 MG/DL (ref 7–30)
CALCIUM SERPL-MCNC: 8.2 MG/DL (ref 8.5–10.1)
CHLORIDE BLD-SCNC: 115 MMOL/L (ref 94–109)
CO2 SERPL-SCNC: 20 MMOL/L (ref 20–32)
CREAT SERPL-MCNC: 0.67 MG/DL (ref 0.52–1.04)
ERYTHROCYTE [DISTWIDTH] IN BLOOD BY AUTOMATED COUNT: 19.5 % (ref 10–15)
GFR SERPL CREATININE-BSD FRML MDRD: >90 ML/MIN/1.73M2
GLUCOSE BLD-MCNC: 68 MG/DL (ref 70–99)
GLUCOSE BLDC GLUCOMTR-MCNC: 122 MG/DL (ref 70–99)
GLUCOSE BLDC GLUCOMTR-MCNC: 181 MG/DL (ref 70–99)
GLUCOSE BLDC GLUCOMTR-MCNC: 210 MG/DL (ref 70–99)
GLUCOSE BLDC GLUCOMTR-MCNC: 61 MG/DL (ref 70–99)
GLUCOSE BLDC GLUCOMTR-MCNC: 71 MG/DL (ref 70–99)
HCT VFR BLD AUTO: 28.4 % (ref 35–47)
HGB BLD-MCNC: 9.6 G/DL (ref 11.7–15.7)
INR PPP: 1.89 (ref 0.85–1.15)
MAGNESIUM SERPL-MCNC: 1.7 MG/DL (ref 1.6–2.3)
MCH RBC QN AUTO: 33.1 PG (ref 26.5–33)
MCHC RBC AUTO-ENTMCNC: 33.8 G/DL (ref 31.5–36.5)
MCV RBC AUTO: 98 FL (ref 78–100)
PHOSPHATE SERPL-MCNC: 2.8 MG/DL (ref 2.5–4.5)
PLATELET # BLD AUTO: 103 10E3/UL (ref 150–450)
POTASSIUM BLD-SCNC: 3.8 MMOL/L (ref 3.4–5.3)
POTASSIUM BLD-SCNC: 4 MMOL/L (ref 3.4–5.3)
PROT SERPL-MCNC: 5.6 G/DL (ref 6.8–8.8)
RBC # BLD AUTO: 2.9 10E6/UL (ref 3.8–5.2)
SODIUM SERPL-SCNC: 141 MMOL/L (ref 133–144)
WBC # BLD AUTO: 4.1 10E3/UL (ref 4–11)

## 2021-12-28 PROCEDURE — 84100 ASSAY OF PHOSPHORUS: CPT | Performed by: PHYSICIAN ASSISTANT

## 2021-12-28 PROCEDURE — 99239 HOSP IP/OBS DSCHRG MGMT >30: CPT | Performed by: INTERNAL MEDICINE

## 2021-12-28 PROCEDURE — 36415 COLL VENOUS BLD VENIPUNCTURE: CPT | Performed by: PHYSICIAN ASSISTANT

## 2021-12-28 PROCEDURE — 250N000013 HC RX MED GY IP 250 OP 250 PS 637: Performed by: PHYSICIAN ASSISTANT

## 2021-12-28 PROCEDURE — 85014 HEMATOCRIT: CPT | Performed by: PHYSICIAN ASSISTANT

## 2021-12-28 PROCEDURE — 85610 PROTHROMBIN TIME: CPT | Performed by: PHYSICIAN ASSISTANT

## 2021-12-28 PROCEDURE — 97530 THERAPEUTIC ACTIVITIES: CPT | Mod: GO

## 2021-12-28 PROCEDURE — 83735 ASSAY OF MAGNESIUM: CPT | Performed by: PHYSICIAN ASSISTANT

## 2021-12-28 PROCEDURE — 250N000013 HC RX MED GY IP 250 OP 250 PS 637: Performed by: STUDENT IN AN ORGANIZED HEALTH CARE EDUCATION/TRAINING PROGRAM

## 2021-12-28 PROCEDURE — 99233 SBSQ HOSP IP/OBS HIGH 50: CPT | Performed by: NURSE PRACTITIONER

## 2021-12-28 PROCEDURE — 97110 THERAPEUTIC EXERCISES: CPT | Mod: GO

## 2021-12-28 PROCEDURE — 90791 PSYCH DIAGNOSTIC EVALUATION: CPT | Performed by: STUDENT IN AN ORGANIZED HEALTH CARE EDUCATION/TRAINING PROGRAM

## 2021-12-28 PROCEDURE — 97535 SELF CARE MNGMENT TRAINING: CPT | Mod: GO

## 2021-12-28 PROCEDURE — 80053 COMPREHEN METABOLIC PANEL: CPT | Performed by: PHYSICIAN ASSISTANT

## 2021-12-28 RX ORDER — CIPROFLOXACIN 250 MG/1
250 TABLET, FILM COATED ORAL EVERY 24 HOURS
Qty: 30 TABLET | Refills: 0 | Status: SHIPPED | OUTPATIENT
Start: 2021-12-29 | End: 2021-12-28

## 2021-12-28 RX ADMIN — HYDROXYZINE HYDROCHLORIDE 25 MG: 25 TABLET, FILM COATED ORAL at 00:43

## 2021-12-28 RX ADMIN — Medication 1 TABLET: at 09:27

## 2021-12-28 RX ADMIN — SUMATRIPTAN SUCCINATE 50 MG: 50 TABLET, FILM COATED ORAL at 06:47

## 2021-12-28 RX ADMIN — FOLIC ACID 1 MG: 1 TABLET ORAL at 09:28

## 2021-12-28 RX ADMIN — FERROUS SULFATE TAB 325 MG (65 MG ELEMENTAL FE) 325 MG: 325 (65 FE) TAB at 09:29

## 2021-12-28 RX ADMIN — Medication 400 UNITS: at 09:29

## 2021-12-28 RX ADMIN — PANCRELIPASE 3 CAPSULE: 30000; 6000; 19000 CAPSULE, DELAYED RELEASE PELLETS ORAL at 12:59

## 2021-12-28 RX ADMIN — LEVOTHYROXINE SODIUM 200 MCG: 0.05 TABLET ORAL at 09:45

## 2021-12-28 RX ADMIN — OXYCODONE HYDROCHLORIDE AND ACETAMINOPHEN 1000 MG: 500 TABLET ORAL at 09:28

## 2021-12-28 RX ADMIN — RIFAXIMIN 550 MG: 550 TABLET ORAL at 09:27

## 2021-12-28 RX ADMIN — Medication 10000 UNITS: at 09:46

## 2021-12-28 RX ADMIN — Medication 50 MCG: at 09:28

## 2021-12-28 RX ADMIN — LACTULOSE 30 ML: 20 SOLUTION ORAL at 09:25

## 2021-12-28 RX ADMIN — CIPROFLOXACIN HYDROCHLORIDE 250 MG: 250 TABLET, FILM COATED ORAL at 09:27

## 2021-12-28 RX ADMIN — THERA TABS 1 TABLET: TAB at 09:29

## 2021-12-28 RX ADMIN — TOPIRAMATE 50 MG: 50 TABLET ORAL at 09:29

## 2021-12-28 RX ADMIN — OMEPRAZOLE 20 MG: 20 CAPSULE, DELAYED RELEASE ORAL at 09:27

## 2021-12-28 RX ADMIN — PANCRELIPASE 3 CAPSULE: 30000; 6000; 19000 CAPSULE, DELAYED RELEASE PELLETS ORAL at 09:50

## 2021-12-28 RX ADMIN — ATORVASTATIN CALCIUM 20 MG: 20 TABLET, FILM COATED ORAL at 09:28

## 2021-12-28 RX ADMIN — APIXABAN 5 MG: 5 TABLET, FILM COATED ORAL at 09:27

## 2021-12-28 RX ADMIN — LACTULOSE 30 ML: 20 SOLUTION ORAL at 14:23

## 2021-12-28 RX ADMIN — THIAMINE HCL TAB 100 MG 100 MG: 100 TAB at 09:28

## 2021-12-28 ASSESSMENT — ACTIVITIES OF DAILY LIVING (ADL)
ADLS_ACUITY_SCORE: 13

## 2021-12-28 NOTE — PROGRESS NOTES
Care Management Follow Up    Length of Stay (days): 2    Expected Discharge Date: 12/28/2021     Concerns to be Addressed: adjustment to diagnosis/illness,mental health,financial/insurance,compliance issue     Patient plan of care discussed at interdisciplinary rounds: Yes    Anticipated Discharge Disposition: Home Care     Anticipated Discharge Services:    Anticipated Discharge DME:      Patient/family educated on Medicare website which has current facility and service quality ratings: other (see comments) (Open with [facility])  Education Provided on the Discharge Plan:    Patient/Family in Agreement with the Plan:      Referrals Placed by CM/SW:    Private pay costs discussed: Not applicable    Additional Information:  I received a voice message from Roly inquiring if he will have additional assistance when he gets home. I called Roly and reviewed process of determining if she is eligible for things such as PCA services. I reviewed that they will need to complete [place] first. I provided education that sometimes PCAs are hard to find if hired so counties sometimes ask if family members are able to proivde paid PCA services. He voiced understanding and I provided the main [place] co[employer] Front door number for them to follow up on referral.       Juanita Wells, [facility]

## 2021-12-28 NOTE — PROGRESS NOTES
Care Management Discharge Note    Discharge Date: 12/28/2021     Discharge Disposition: Home     Discharge Services: Home Care    Discharge DME: N/A    Discharge Transportation: Car, family or friend will provide    Private pay costs discussed: Not applicable    PAS Confirmation Code: N/A  Patient/family educated on Medicare website which has current facility and service quality ratings: Open with [facility]    Education Provided on the Discharge Plan: Yes   Persons Notified of Discharge Plans: Patient, bedside RN, SW, [facility]  Patient/Family in Agreement with the Plan: Yes    Handoff Referral Completed: Yes    Additional Information:    Per team, patient will discharge home today.     Writer updated [facility].     Anne-Marie Saleem, RN, BSN, PHN  Care Coordinator   P: [phone], [facility]

## 2021-12-28 NOTE — PROGRESS NOTES
"[facility]  HEPATOLOGY PROGRESS NOTE  Susan Puckett [id number]       CC: HE  SUBJECTIVE:  Feeling improved and now at baseline for mentation. Denies fevers, nausea or vomiting. Has been having soft/loose BM's without melena or hematochezia. She does have an appetite.     ROS:  A 10-point review of systems was negative.    OBJECTIVE:  VS: /71 (BP Location: Left arm)   Pulse 66   Temp 98.2  F (36.8  C) (Axillary)   Resp 16   Ht 1.651 m (5' 5\")   Wt 65.4 kg (144 lb 3.2 oz)   SpO2 100%   BMI 24.00 kg/m       Gross per 24 hour   Intake 360 ml   Output 2250 ml   Net -1890 ml     General: In no acute distress, mild facial muscle wasting  Neuro: AOx3, No asterixis  Lymph: No cervical lymphadenopathy  HEENT:  Noscleral icterus, Nooral lesions  CV: . Skin warm and dry  Lungs:  Respirations even and nonlabored on room air  Abd:  Nondistended, nontender   Extrem: Noperipheral edema   Skin: trace jaundice  Psych: pleasant    MEDICATIONS:  Current Facility-Administered Medications   Medication     acetaminophen (TYLENOL) tablet 500 mg     albuterol (PROVENTIL HFA/VENTOLIN HFA) inhaler     amylase-lipase-protease (CREON 6) 4831-64262-10171 units per capsule 3 capsule     apixaban ANTICOAGULANT (ELIQUIS) tablet 5 mg     atorvastatin (LIPITOR) tablet 20 mg     calcium carbonate (TUMS) chewable tablet 500 mg     calcium citrate and vitamin D (CITRACAL) 200-250 MG-UNIT per tablet TABS 1 tablet     ciprofloxacin (CIPRO) tablet 250 mg     cyanocobalamin (VITAMIN B-12) tablet 1,000 mcg     Daily 2 GRAM acetaminophen limit, unless fulminent liver failure or transaminases greater than or equal to 300 - 400, then none     glucose gel 15-30 g    Or     dextrose 50 % injection 25-50 mL    Or     glucagon injection 1 mg     glucose gel 15-30 g    Or     dextrose 50 % injection 25-50 mL    Or     glucagon injection 1 mg     ferrous sulfate (FEROSUL) tablet 325 mg     folic acid (FOLVITE) tablet 1 mg     [Held by provider] " furosemide (LASIX) tablet 40 mg     hydrOXYzine (ATARAX) tablet 25 mg     insulin aspart (NovoLOG) injection (RAPID ACTING)     insulin aspart (NovoLOG) injection (RAPID ACTING)     insulin glargine (LANTUS PEN) injection 12 Units     lactulose (CHRONULAC) solution 20 g     lactulose (CHRONULAC) solution 30 mL     levothyroxine (SYNTHROID/LEVOTHROID) tablet 200 mcg     lidocaine (LMX4) cream     lidocaine 1 % 0.1-1 mL     melatonin tablet 1 mg     metoclopramide (REGLAN) tablet 10 mg     multivitamin, therapeutic (THERA-VIT) tablet     omeprazole (priLOSEC) CR capsule 20 mg     ondansetron (ZOFRAN-ODT) ODT tab 4 mg    Or     ondansetron (ZOFRAN) injection 4 mg     Patient is already receiving anticoagulation with heparin, enoxaparin (LOVENOX), warfarin (COUMADIN)  or other anticoagulant medication     polyethylene glycol (MIRALAX) Packet 17 g     potassium chloride ER (KLOR-CON M) CR tablet 20 mEq     potassium chloride ER (KLOR-CON M) CR tablet 40 mEq     prochlorperazine (COMPAZINE) tablet 10 mg     rifaximin (XIFAXAN) tablet 550 mg     simethicone (MYLICON) chewable tablet 80 mg     sodium chloride (PF) 0.9% PF flush 3 mL     sodium chloride (PF) 0.9% PF flush 3 mL     [Held by provider] spironolactone (ALDACTONE) tablet 100 mg     SUMAtriptan (IMITREX) tablet 50 mg     thiamine (B-1) tablet 100 mg    Or     thiamine (B-1) injection 100 mg     topiramate (TOPAMAX) tablet 50 mg     traZODone (DESYREL) tablet  mg     valACYclovir (VALTREX) tablet 1,000 mg     venlafaxine (EFFEXOR) tablet 75 mg     vitamin A capsule 10,000 Units     vitamin C (ASCORBIC ACID) tablet 1,000 mg     Vitamin D3 (CHOLECALCIFEROL) tablet 50 mcg     vitamin E (TOCOPHEROL) 400 units (180 mg) capsule 400 Units       REVIEW OF LABORATORY, PATHOLOGY AND IMAGING RESULTS:  BMP  Recent Labs   Lab 12/28/21  0954 12/28/21  0926 12/28/21  0613 12/28/21  0533 12/28/21  0434 12/27/21  2339 12/27/21  2243 12/27/21  1637 12/27/21  0816  12/27/21 0422 12/26/21  1008 12/26/21  0402   NA  --   --   --   --  141  --   --   --   --  141  --  142   POTASSIUM  --   --   --   --  3.8 4.0  --  3.4  --  2.8*   < > 3.9   CHLORIDE  --   --   --   --  115*  --   --   --   --  113*  --  113*   MAURI  --   --   --   --  8.2*  --   --   --   --  9.0  --  9.4   CO2  --   --   --   --  20  --   --   --   --  19*  --  22   BUN  --   --   --   --  6*  --   --   --   --  10  --  13   CR  --   --   --   --  0.67  --   --   --   --  0.74  --  0.77   * 71 181* 61* 68*  --    < > 244*   < > 107*   < > 201*    < > = values in this interval not displayed.     CBC  Recent Labs   Lab 12/28/21 0434 12/27/21 0422 12/26/21 0402   WBC 4.1 4.0 2.9*   RBC 2.90* 2.96* 3.35*   HGB 9.6* 9.7* 10.8*   HCT 28.4* 29.7* 32.7*   MCV 98 100 98   MCH 33.1* 32.8 32.2   MCHC 33.8 32.7 33.0   RDW 19.5* 19.7* 19.8*   * 118* 106*     INR  Recent Labs   Lab 12/28/21 0434 12/27/21 0422 12/26/21 0402   INR 1.89* 2.04* 1.50*     LFTs  Recent Labs   Lab 12/28/21 0434 12/27/21 0422 12/26/21 0402   ALKPHOS 186* 198* 200*   AST 72* 72* 50*   ALT 39 36 32   BILITOTAL 1.5* 2.1* 2.2*   PROTTOTAL 5.6* 6.2* 7.5   ALBUMIN 2.3* 2.6* 3.0*      PANCNo lab results found in last 7 days.   MELD-Na score: 15 at 12/28/2021  4:34 AM  MELD score: 15 at 12/28/2021  4:34 AM  Calculated from:  Serum Creatinine: 0.67 mg/dL (Using min of 1 mg/dL) at 12/28/2021  4:34 AM  Serum Sodium: 141 mmol/L (Using max of 137 mmol/L) at 12/28/2021  4:34 AM  Total Bilirubin: 1.5 mg/dL at 12/28/2021  4:34 AM  INR(ratio): 1.89 at 12/28/2021  4:34 AM  Age: 51 years      Imaging Results:  US TIPS 12/27/2021  Impression: Limited exam due to patient cooperation.  1. Patent TIPS with velocities within normal limits.  Ongoing sluggish  flow at the portal venous end of shunt, 34 cm/sec.  2. Cirrhotic configuration of the liver.  3. No ascites in all 4 quadrants.  4. Pancreas, spleen, left portal vein, and aorta were not  visualized  with ultrasound.    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  She is currently listed for liver transplant with low MELD. Mentation improved with lactulose.     RECOMMENDATIONS:  1. Hepatic encephalopathy  2. Mild dehydration  - mentation clear now at baseline.   - likely more sensitive for HE events due to TIPS. Continue with lactulose with goal of 3-4 BM's per day. Continue rifaximin  - infectious work up so far negative.   - with diuretics, she may have had mild dehydration. She has now taken more free fluid yesterday. PTA diuretics with lasix 40 and spironolactone 100 mg daily. No pleural effusion or ascites on imaging and no peripheral edema.  Continue to hold diuretics as outpatient. If needed, these can be restarted at half dose if she develops ascites/peripheral edema as OP.    - would not narrow TIPS at this time, may consider in the future if unable to control HE with current therapies.      3. CUETO cirrhosis  4. S/p TIPS for hepatic hydrothorax  - MELD 17. Listed for liver transplant.   - US doppler 12/27 without ascites, pleural effusion or HCC. Patent TIPS. She does have apt with OP IR to evaluate TIPS.   - no plans for repeat EGD with patent TIPS, no overt signs of bleeding.   - MELD 15.     Patient was discussed with attending physician, Dr. Gutierrez    Next follow up appointment: Dr. Cook 1/6    Thank you for the opportunity to be involved with  Susan Puckett care. Please call with any questions or concerns.    CAIN Mccormick, CNP  Inpatient Hepatology [facility]  Text Link

## 2021-12-28 NOTE — PROGRESS NOTES
Removed duplicate HS sliding scale insulin, medium intensity.  Will keep today's order for low intensity insulin HS.

## 2021-12-28 NOTE — PROVIDER NOTIFICATION
"MD paged ([phone])  \"Per patient's prn ordered, she needs 1 more dose of lactulose. Very tearful about this. On assessment she is orientated and not confused. does she still need to receive this dose? Thanks!\"    Per MD-  Okay to hold of on prn dose.   "

## 2021-12-28 NOTE — PROGRESS NOTES
Afebrile,vitals stable,alert and oriented x4..Appetite fair,denied nausea.Watery tan color bowel movement x1 this shift..Patient seen by provider,discharge order written. Discharge orders explained to patient and ,follow up appointments explained ,discharge medications were explained rifaximin order needs  To be clarified with provider,text sent to Dr Cuellar to clarify rifaximin dosage that pt need to take at home,awaiting call back.Patient need to sign discharge order  review page once provider clarifies the rifaximin dose.No new med script written.Continue per plan of care.

## 2021-12-28 NOTE — COMMITTEE REVIEW
Abdominal Committee Review Note     Evaluation Date: 10/18/2020  Committee Review Date: 12/28/2021    Organ being evaluated for: Liver    Transplant Phase: Waitlist  Transplant Status: Active    Transplant Coordinator: Julia Munoz  Transplant Surgeon:   Mil Tejada    Referring Physician: Rivera Dowling    Primary Diagnosis: Cirrhosis: Fatty Liver (Du)  Secondary Diagnosis:     Committee Review Members:  Nutrition Nick Lauren, RD   Pharmacist Zach Conde, [facility]    - Clinical Dat Iglesias, RENARD, Juanita Wells, [facility]   Surgery Bhavana Romo NP   Transplant Hazel Jean, RN, Julia Munoz, RN, Yenni Cristina, RN, Brandi Nichols, RN, Jr Yves Briseno, AJAY, Che Burgess MD, Shelby Vicente, APRN CNP, Oliver Tran RN   Transplant Hepatology  Raphael Cook MD, Raymundo Gutierrez MD, Yoandy Gonzalez MD   Transplant Surgery Delbert Morgan MD, Himanshu Farias MD       Transplant Eligibility: Cirrhosis with MELD, DU    Committee Review Decision: Remain Active    Relative Contraindications: None    Absolute Contraindications: None    Committee Chair Raymundo Gutierrez MD verbally attested to the committee's decision.    Committee Discussion Details:      - inpatient due to HE, now improved    - has follow up next week with Dr. Cook, remain active on transplant list.

## 2021-12-28 NOTE — PLAN OF CARE
Afebrile. OVSS. A&O x4. No confusion noted overnight. Scheduled lactulose given. She had 1 watery stool last night and 2 watery stools since midnight. Sad/teary eyed at times when talking about her health issues. imitrex x1 for headache. Denies n/v. Low blood sugar this morning, 61-apple juice given. Recheck blood sugar was 181. Asymptomatic when blood sugar was low. Melatonin given at bedtime. Atarax x1 for anxiety. Up with SBA. Bed alarm on for safety, patient has been calling appropriately. Possibly discharging home today. Continue plan of care.         Problem: Adult Inpatient Plan of Care  Goal: Plan of Care Review  Outcome: No Change  Flowsheets (Taken 12/28/2021 3315)  Plan of Care Reviewed With: patient  Progress: no change     Problem: Adult Inpatient Plan of Care  Goal: Patient-Specific Goal (Individualized)  Outcome: No Change     Problem: Adult Inpatient Plan of Care  Goal: Absence of Hospital-Acquired Illness or Injury  Outcome: No Change     Problem: Adult Inpatient Plan of Care  Goal: Optimal Comfort and Wellbeing  Outcome: No Change     Problem: Adult Inpatient Plan of Care  Goal: Readiness for Transition of Care  Outcome: No Change     Problem: Cognitive Impairment  Goal: Optimal Cognitive Function  Outcome: Improving

## 2021-12-28 NOTE — INTERIM SUMMARY
"Cross cover    Received page regarding patient: Patient AOx3, has already had multiple soft stools today (3 or more.) Is very upset about lactulose being brought to bedside this evening. Ok to hold off on evening lactulose dosing.     /72 (BP Location: Right arm)   Pulse 80   Temp 98  F (36.7  C) (Oral)   Resp 18   Ht 1.651 m (5' 5\")   Wt 65.4 kg (144 lb 3.2 oz)   SpO2 100%   BMI 24.00 kg/m         Dr. Sindy Bains  Internal Medicine/Pediatrics      This note was created using voice recognition software and may contain minor errors.       "

## 2021-12-28 NOTE — CONSULTS
Health Psychology                                                                                                                          Bette Shaw, Ph.D., L.P [phone]  Kae Mota, Ph.D., L.P. [phone]  Haily Chiang, Ph.D. [phone]  Angeles Whitmore, Ph.D., L.P. [phone]  Mike Ansari, Ph.D., A.B.P.P., L.P. [phone]         Nayely Marinelli, Ph.D., L.P. [phone]                [facility], 3rd Floor  [address] [place] [address]      Confidential Summary of Inpatient Health Psychology Consultation*    Date of Service:  12/28/21    Referring Provider:  Dr. Lin [place] [id number] Medicine Team    Reason for Consultation:  Difficulty coping with acute and chronic illness and medication side effects.    Sources of Information:  Information was obtained from a clinical interview with the patient and review of medical record.    History of Present Illness:  Susan Puckett is a 51 year old female    Met with Susan in her hospital room.  Introduced Health Psychology and discussed nature of referral.  Susan provided background about multiple stressors in her life ranging from deaths of multiple family members beginning in 2004 to dealing with complex medical issues in the present.  She said that someone mentioned she may be experiencing PTSD and links this to having lived through multiple family and medical crises. Susan also described feeling that she is often seen as too emotional, someone who causes problems, and is often blamed for things in her family.     Currently, Susan describes feeling stressed and uncertain about how to deal with challenges she is facing such as navigating complex relationships with family members. She noted that coming into the hospital leaves her feeling more anxious and vulnerable and she said she feels like she hasn't recovered from her previous admission.  She said she would appreciate help in how to be less  defensive with others and also wanted to talk about how to tell her nephew that his medical recommendations are not applicable.     Susan endorsed feeling overwhelmed by stressors and the amount of requirements she has to manage her health.  We discussed coping skills she is currently using and what other skills she can integrate to help her manage her healthcare regiments and care for her mental health.     Validated Susan's emotional experiences and provided psychoeducation about various topics such as posttraumatic responses, emotional sensitivity, and coping skills. Susan said she wants to journal more regularly.  We discussed what she could journal about. Encouraged her to return to outpatient therapy and she said she would prefer to see her provider with [facility] and she has their phone number.     Current symptoms: Overwhelm, worry, sadness, fear, guilt, indecisiveness, emotional lability and emotion dysregulation.  Impact of symptoms on functioning: relationship distress, reduced medication adherence.           Medical History:  See below lists for past medical history, past surgical history, and current medications    Past Medical History:   Diagnosis Date     Anemia      Anxiety      Cold sore      Depressive disorder      Diabetes (H)     Type 2 DM/No Insulin      Encephalopathy      Esophageal varices without bleeding (H)     grade I     History of blood transfusion     10/2019     History of seizure     diabetic seizure     Insomnia      Liver cirrhosis secondary to CUETO (H) 05/28/2020     Migraine      Pleural effusion      Thrombocytopenia (H)      Thyroid disease        Past Surgical History:   Procedure Laterality Date     APPENDECTOMY      1989     COLONOSCOPY      1/2020 at [facility]      ENT SURGERY  1998    tempanoplasty     GI SURGERY      EGD August 2020 at [facility]      GYN SURGERY  2010    laparoscopic ablation     IR TRANSVEN INTRAHEPATIC PORTOSYST SHUNT  11/5/2021      MAMMOPLASTY REDUCTION BILATERAL  2004     CA STAB PHELBECTOMY VARICOSE VEINS, LESS THAN 10 INCISIONS, ONE EXTREMITY  2020     RNY gastric bypass  01/2006    retoocolic, retrogastric, [facility]     TONSILLECTOMY & ADENOIDECTOMY Bilateral 1978     WISDOM TEETH EXTRACTION         Current Facility-Administered Medications   Medication     acetaminophen (TYLENOL) tablet 500 mg     albuterol (PROVENTIL HFA/VENTOLIN HFA) inhaler     amylase-lipase-protease (CREON 6) [id number] units per capsule 3 capsule     apixaban ANTICOAGULANT (ELIQUIS) tablet 5 mg     atorvastatin (LIPITOR) tablet 20 mg     calcium carbonate (TUMS) chewable tablet 500 mg     calcium citrate and vitamin D (CITRACAL) 200-250 MG-UNIT per tablet TABS 1 tablet     ciprofloxacin (CIPRO) tablet 250 mg     cyanocobalamin (VITAMIN B-12) tablet 1,000 mcg     Daily 2 GRAM acetaminophen limit, unless fulminent liver failure or transaminases greater than or equal to 300 - 400, then none     glucose gel 15-30 g    Or     dextrose 50 % injection 25-50 mL    Or     glucagon injection 1 mg     glucose gel 15-30 g    Or     dextrose 50 % injection 25-50 mL    Or     glucagon injection 1 mg     ferrous sulfate (FEROSUL) tablet 325 mg     folic acid (FOLVITE) tablet 1 mg     [Held by provider] furosemide (LASIX) tablet 40 mg     hydrOXYzine (ATARAX) tablet 25 mg     insulin aspart (NovoLOG) injection (RAPID ACTING)     insulin aspart (NovoLOG) injection (RAPID ACTING)     insulin glargine (LANTUS PEN) injection 12 Units     lactulose (CHRONULAC) solution 20 g     lactulose (CHRONULAC) solution 30 mL     levothyroxine (SYNTHROID/LEVOTHROID) tablet 200 mcg     lidocaine (LMX4) cream     lidocaine 1 % 0.1-1 mL     melatonin tablet 1 mg     metoclopramide (REGLAN) tablet 10 mg     multivitamin, therapeutic (THERA-VIT) tablet     omeprazole (priLOSEC) CR capsule 20 mg     ondansetron (ZOFRAN-ODT) ODT tab 4 mg    Or     ondansetron (ZOFRAN) injection 4 mg     Patient  is already receiving anticoagulation with heparin, enoxaparin (LOVENOX), warfarin (COUMADIN)  or other anticoagulant medication     polyethylene glycol (MIRALAX) Packet 17 g     potassium chloride ER (KLOR-CON M) CR tablet 20 mEq     potassium chloride ER (KLOR-CON M) CR tablet 40 mEq     prochlorperazine (COMPAZINE) tablet 10 mg     rifaximin (XIFAXAN) tablet 550 mg     simethicone (MYLICON) chewable tablet 80 mg     sodium chloride (PF) 0.9% PF flush 3 mL     sodium chloride (PF) 0.9% PF flush 3 mL     [Held by provider] spironolactone (ALDACTONE) tablet 100 mg     SUMAtriptan (IMITREX) tablet 50 mg     thiamine (B-1) tablet 100 mg    Or     thiamine (B-1) injection 100 mg     topiramate (TOPAMAX) tablet 50 mg     traZODone (DESYREL) tablet  mg     valACYclovir (VALTREX) tablet 1,000 mg     venlafaxine (EFFEXOR) tablet 75 mg     vitamin A capsule 10,000 Units     vitamin C (ASCORBIC ACID) tablet 1,000 mg     Vitamin D3 (CHOLECALCIFEROL) tablet 50 mcg     vitamin E (TOCOPHEROL) 400 units (180 mg) capsule 400 Units         Psychiatric History:  Susan has a history of depression and anxiety historically managed by psychotropic medication (Trazodone, Effexor, Atarax) and episodes of outpatient psychotherapy. In the past two years she met with Dr. Sancho Gaines for a few sessions and also a provider with [facility] in [place]. She has been diagnosed with generalized anxiety disorder and major depressive disorder. Anxiety has been present for around 5 years and her depressive symptoms are primarily related to her health.  She mentions a diagnosis of PTSD but she denies symptoms consistent with this diagnosis.     Susan denied history of miguel, psychosis, OCD, and disordered eating although she does describe a history of emotional eating patterns which has been stable.     Susan denied history of inpatient psychiatric admissions, self-injurious behavior, and suicidal ideation.     Social History:  Susan lives with  her  Rivera, they have been together for roughly 12 years. She does not have biological children but has step-children and she also has helped to raise her late sister's two sons at times.  Her highest level of completed education was a bachelor's degree in child development.  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     Susan has support from her  and her mother.     Mental Status/Interview:  Appearance/Behavior/Orientation: Alert and oriented to person, place, time, and situation. No evidence of psychomotor agitation.    Cooperation/Reliability: Patient appeared to honestly respond to questions about psychosocial functioning and is deemed a reliable historian.   Cognition/Memory/Judgment: Not formally assessed, yet no difficulties apparent upon interview. Fund of knowledge consistent with age, level of education, and life experience. Abstract reasoning appropriate, no difficulties with judgment apparent.  Speech/Language: Speech was clear, logical and coherent, of normal rate, rhythm and volume.   Thought Content/Form: Appropriate to interview and situation. Overall logical and organized. Patient denied any difficulties with a thought disorder, including auditory or visual hallucinations. Denied any history of obsessive-compulsive disorder or of miguel.   Mood/Affect: Mood stressed, overwhelmed; affect was mood congruent, tearful at times.   Appetite: Patient described fair appetite.    Insight/Motivation: Fair insight  Suicide/Assault: Patient denies suicidal or assaultive ideation, plan, or intent.     Impression:  The PHQ-9 is an instrument for screening, diagnosing, monitoring and measuring the severity of depression. Scores of 5, 10, 15, and 20 represent cutpoints for mild, moderate, moderately severe and severe depression, respectively.   PHQ-9 SCORE 11/5/2020 2/10/2021   PHQ-9 Total Score MyChart 10 (Moderate depression) 8 (Mild depression)   PHQ-9 Total Score 10 8        The AILIN-7 is an instrument for screening, diagnosing, monitoring and measuring the severity of anxiety. Scores of 5, 10, and 15 represent cutpoints for mild, moderate, and severe anxiety, respectively.  AILIN-7 SCORE 11/5/2020 2/10/2021   Total Score 10 (moderate anxiety) 3 (minimal anxiety)   Total Score 10 3     Susan reports history of symptoms of anxiety and depression and there is also a noted history of interpersonal ineffectiveness and emotion dysregulation. While she has endured many significant acute stressors in her life she does not meet criteria for PTSD. She would benefit from engaging in outpatient psychotherapy and she has a clinic she is familiar with she can return to.         Diagnosis:  Major depressive disorder, recurrent ,moderate  Generalized anxiety disorder.     Recommendation/Plan:  Return to outpatient therapist - possible therapy goals: maintaining self-management of health routine, addressing emotional sensitivity and dysregulation.     Visit start time:12:30  Visit end time: 1:15    Haily Chiang, PhD  Clinical Health Psychologist  Phone: [phone]    *In accordance with the Rules of the [place] Board of [employer], it is noted that psychological descriptions and scientific procedures underlying psychological evaluations have limitations.  Absolute predictions cannot be made based on information in this report.

## 2021-12-28 NOTE — PLAN OF CARE
Problem: Adult Inpatient Plan of Care  Goal: Plan of Care Review  Outcome: Improving  Goal: Patient-Specific Goal (Individualized)  Outcome: Improving  Goal: Absence of Hospital-Acquired Illness or Injury  Outcome: Improving  Intervention: Identify and Manage Fall Risk  Recent Flowsheet Documentation  Taken 12/27/2021 0800 by Amy Fernando RN  Safety Promotion/Fall Prevention:    activity supervised    bed alarm on    fall prevention program maintained  Intervention: Prevent Skin Injury  Recent Flowsheet Documentation  Taken 12/27/2021 0800 by Amy Fernando RN  Body Position: turned  Intervention: Prevent and Manage VTE (Venous Thromboembolism) Risk  Recent Flowsheet Documentation  Taken 12/27/2021 0800 by Amy Fernando RN  VTE Prevention/Management: ambulation promoted  Goal: Optimal Comfort and Wellbeing  Outcome: Improving  Teary eyed. Oriented x 4. Much more alert today and able to hold a conversation. Patient feels clearer today but is still having some confusion. [facility] and received prn lactulose along with scheduled doses. Complained of a headache and received imitrex with relief. Blood sugar 242,105 and 244. Susan has an implanted insulin device. On scheduled creon to take with meals. Ate 1/2 tomato soup, grilled cheese sandwich, pork jimenez 1/2 orange sherbet, 6 oz smoothie and an apple juice. Eating dinner now. Replaced K+ and recheck 3.4 and received 40 meq. The liver doctor, gold service and the psychologist saw Susan. Two moderate sized watery stools. Assist x 1 to the bathroom. Bed alarm on. Plan is possible discharge tomorrow.  at bedside.

## 2021-12-29 ENCOUNTER — PATIENT OUTREACH (OUTPATIENT)
Dept: GASTROENTEROLOGY | Facility: CLINIC | Age: 51
End: 2021-12-29
Payer: COMMERCIAL

## 2021-12-29 DIAGNOSIS — K74.60 LIVER CIRRHOSIS SECONDARY TO NASH (H): Primary | ICD-10-CM

## 2021-12-29 DIAGNOSIS — K75.81 LIVER CIRRHOSIS SECONDARY TO NASH (H): Primary | ICD-10-CM

## 2021-12-29 NOTE — PLAN OF CARE
Occupational Therapy Discharge Summary    Reason for therapy discharge:    Discharged to home with home therapy.    Progress towards therapy goal(s). See goals on Care Plan in [facility] electronic health record for goal details.  Goals partially met.  Barriers to achieving goals:   discharge from facility.    Therapy recommendation(s):    Continued therapy is recommended.  Rationale/Recommendations:  Pt would benefit from further skilled therapies to progress functional IND and safety at home.

## 2021-12-29 NOTE — DISCHARGE SUMMARY
[facility]  Hospitalist Discharge Summary      Date of Admission:  12/26/2021  Date of Discharge:  12/28/2021  4:31 PM  Discharging Provider: Mil Thorne MD  Discharge Team: Hospitalist Service, Gold 11    Discharge Diagnoses   Hepatic encephalopathy  Decompensated CUETO cirrhosis with recent TIPS  Nonocclusive right upper extremity DVT  Pancytopenia  Pancreatic insufficiency  DMII  Moderate malnutrition  Hypothyroidism  Migraine headaches  Anxiety and depression  Hyperlipidemia      Follow-ups Needed After Discharge   Follow-up Appointments     Adult [facility] Follow-up and recommended labs and tests      Follow up with primary care provider, Harleen Billy, within 7 days for   hospital follow- up.  This could be a virtual visit if needed. You can   address simplifying your medication regimen and assessing your fluid   status.    Appointments on [place] and/or [facility] (with [facility] or [facility]   provider or service). Call [phone] if you haven't heard regarding   these appointments within 7 days of discharge.             Unresulted Labs Ordered in the Past 30 Days of this Admission     No orders found from 11/26/2021 to 12/27/2021.        Discharge Disposition   Discharged to home  Condition at discharge: Stable    Hospital Course   51 year old woman with CUETO cirrhosis, hx varices, hydrothorax s/p TIPS who was admitted with hepatic encephalopathy. Likely caused by combination of dehydration from diuretics along with insufficient lactulose. Patient's  corroborates history that patient was not taking lactulose as prescribed to titrate to stool output. On discharge, she was encouraged to continue taking lactulose and we stopped her furosemide and spironolactone. She is often overwhelmed at the number of medications she takes, so she should meet with her hepatology and PCP teams to see if her medication regimens can be simplified.    Had hypoglycemia  during admission and reports hypoglycemia about 3x/week. Will follow up with endocrinology at discharge. Decreased lantus from 12 units to 8 units daily.     Consultations This Hospital Stay   MEDICATION HISTORY IP PHARMACY CONSULT  GI HEPATOLOGY ADULT IP CONSULT  NUTRITION SERVICES ADULT IP CONSULT  PHYSICAL THERAPY ADULT IP CONSULT  OCCUPATIONAL THERAPY ADULT IP CONSULT  PSYCHOLOGY ADULT IP CONSULT  CARE MANAGEMENT / SOCIAL WORK IP CONSULT    Code Status   Prior    Time Spent on this Encounter   I, Mil Thorne MD, personally saw the patient today and spent greater than 30 minutes discharging this patient.       Mil Thorne MD  [facility] [address] [facility] [address]  [address] [address]  Phone: [phone]  Fax: [phone]  ______________________________________________________________________    Physical Exam   Vital Signs: Temp: 97.6  F (36.4  C) Temp src: Axillary BP: 124/78 Pulse: 69   Resp: 16 SpO2: 100 % O2 Device: None (Room air)    Weight: 144 lbs 3.2 oz  Constitutional: awake, alert, cooperative, no apparent distress, and appears stated age  GI: No scars, normal bowel sounds, soft, non-distended, non-tender, no masses palpated, no hepatosplenomegally  Neurologic: No asterixis, oriented to person place and time, walks normally.       Primary Care Physician   Harleen Billy    Discharge Orders      Home care nursing referral      Home Care PT Referral for Hospital Discharge      Home Care OT Referral for Hospital Discharge      Reason for your hospital stay    You were admitted for hepatic encephalopathy (buildup of liver toxins), which could have been triggered by dehydration and not enough lactulose. We will stop your diuretics (spironolactone and furosemide) and continue lactulose. To keep your sugars about 70, we will decrease your lantus to 8 units daily.    Follow up with your liver team and primary team to discuss simplifying your medication  regimens. You could consider decreasing the amounts of vitamins/supplements you take. Follow up with endocrinology to see if you still need to take the Januvia or if your insulin can be adjusted.    Thank you for allowing us to care for you!     Activity    Your activity upon discharge: activity as tolerated     When to contact your care team    Call your primary doctor if you have any of the following: difficulty thinking, abdominal pain, fever/chills, troubles with your medications, swelling in your legs or abdomen, or any other concerns.     Adult [facility] Follow-up and recommended labs and tests    Follow up with primary care provider, Harleen Billy, within 7 days for hospital follow- up.  This could be a virtual visit if needed. You can address simplifying your medication regimen and assessing your fluid status.    Appointments on [place] and/or [facility] (with [facility] or [facility] provider or service). Call [phone] if you haven't heard regarding these appointments within 7 days of discharge.     Diet    Follow this diet upon discharge: Regular Diet Adult       Significant Results and Procedures   Most Recent 3 CBC's:Recent Labs   Lab Test 12/28/21  0434 12/27/21  0422 12/26/21  0402   WBC 4.1 4.0 2.9*   HGB 9.6* 9.7* 10.8*   MCV 98 100 98   * 118* 106*     Most Recent 3 BMP's:Recent Labs   Lab Test 12/28/21  1227 12/28/21  0954 12/28/21  0926 12/28/21  0533 12/28/21  0434 12/27/21  2339 12/27/21  2243 12/27/21  1637 12/27/21  0816 12/27/21  0422 12/26/21  1008 12/26/21  0402   NA  --   --   --   --  141  --   --   --   --  141  --  142   POTASSIUM  --   --   --   --  3.8 4.0  --  3.4  --  2.8*   < > 3.9   CHLORIDE  --   --   --   --  115*  --   --   --   --  113*  --  113*   CO2  --   --   --   --  20  --   --   --   --  19*  --  22   BUN  --   --   --   --  6*  --   --   --   --  10  --  13   CR  --   --   --   --  0.67  --   --   --   --  0.74  --  0.77   ANIONGAP  --   --   --   --  6  --    --   --   --  9  --  7   MAURI  --   --   --   --  8.2*  --   --   --   --  9.0  --  9.4   * 122* 71   < > 68*  --    < > 244*   < > 107*   < > 201*    < > = values in this interval not displayed.     Most Recent 2 LFT's:Recent Labs   Lab Test 12/28/21 0434 12/27/21 0422   AST 72* 72*   ALT 39 36   ALKPHOS 186* 198*   BILITOTAL 1.5* 2.1*     Most Recent 3 INR's:Recent Labs   Lab Test 12/28/21  0434 12/27/21  0422 12/26/21  0402   INR 1.89* 2.04* 1.50*   ,   Results for orders placed or performed during the hospital encounter of 12/26/21   CT Head w/o Contrast    Narrative    EXAM: CT HEAD W/O CONTRAST  LOCATION: [facility]  DATE/TIME: 12/26/2021 5:57 AM    INDICATION: Mental status change, unknown cause  COMPARISON: None.  TECHNIQUE: Routine CT Head without IV contrast. Multiplanar reformats. Dose reduction techniques were used.    FINDINGS:  Motion and streak artifact limits aspects of exam.    INTRACRANIAL CONTENTS: No intracranial hemorrhage, extraaxial collection, or mass effect.  No CT evidence of acute infarct. Normal parenchymal attenuation. Normal ventricles and sulci.     VISUALIZED ORBITS/SINUSES/MASTOIDS: No intraorbital abnormality. No paranasal sinus mucosal disease. No middle ear or mastoid effusion.    BONES/SOFT TISSUES: No acute abnormality.      Impression    IMPRESSION:  1.  No acute intracranial process given motion artifact.   XR Chest Port 1 View    Narrative    EXAM: XR CHEST PORT 1 VIEW  12/26/2021 11:41 AM      HISTORY: acute encephalopathy, history of hepatic hydrothorax and  thoracentesis    COMPARISON: 12/13/2021, 11/7/2021    FINDINGS: Upright AP radiograph the chest. The trachea is midline. The  cardiac silhouette is within normal limits. No pleural effusion or  pneumothorax. Mild basilar opacities. Visualized upper abdomen notable  for TIPS stent. Scattered gas-filled loops of bowel in the upper  abdomen. Degenerative changes of the  shoulders.      Impression    IMPRESSION: Mild bibasilar opacities favoring atelectasis. No  significant pleural effusion to suggest hepatic hydrothorax.    GHULAM SORIA MD         SYSTEM ID:  [id number]   US Upper Extremity Venous Duplex Bilateral Port    Narrative    EXAMINATION: DOPPLER VENOUS ULTRASOUND OF BILATERAL UPPER EXTREMTIES,  12/26/2021 12:47 PM     COMPARISON: None.    HISTORY: History of DVT and missed doses of DOAC    TECHNIQUE:  Gray-scale evaluation with compression, spectral flow, and  color Doppler assessment of the deep venous system of both upper  extremities.    FINDINGS:  Normal blood flow and waveforms are demonstrated in the internal  jugular, innominate, subclavian, and axillary veins bilaterally.      Impression    IMPRESSION: No evidence of deep venous thrombosis in either upper  extremity in the axillary, subclavian, innominate, or internal jugular  veins.    I have personally reviewed the examination and initial interpretation  and I agree with the findings.    GHULAM SORIA MD         SYSTEM ID:  [id number]   US Lower Extremity Venous Duplex Right Port    Narrative    EXAMINATION: DOPPLER VENOUS ULTRASOUND OF BILATERAL LOWER EXTREMITIES,  12/26/2021 12:47 PM     COMPARISON: None.    HISTORY: History of DVT and missed doses of DOAC    TECHNIQUE:  Gray-scale evaluation with compression, spectral flow and  color Doppler assessment of the deep venous system of both legs from  groin to knee, and then at the ankles.    FINDINGS:  In the right lower extremity, the common femoral, femoral, popliteal  and posterior tibial veins demonstrate normal compressibility and  blood flow.    The left common femoral vein demonstrates normal compressibility and  blood flow. The remaining left lower extremity was not imaged as  patient was combative.      Impression    IMPRESSION:  1.  No evidence of deep venous thrombosis in the right lower  extremity.  2.  The left lower extremity was not fully  imaged as the patient was  combative.    I have personally reviewed the examination and initial interpretation  and I agree with the findings.    GHULAM SORIA MD         SYSTEM ID:  [id number]   US Abdomen Complete with TIPSS Doppler    Narrative    EXAMINATION: US ABDOMEN COMPLETE WITH TIPSS DOPPLER, 12/27/2021 1:35  AM     COMPARISON: Doppler ultrasound of TIPS dated 11/6/2021    HISTORY: TIPS assessment, please also assess for ascites    TECHNIQUE:  Grey-scale, color Doppler and spectral flow analysis.    Findings:     Technically challenging ultrasound evaluation secondary to patient's  inability to cooperate during exam.    Liver: Coarse nodular appearance of the liver measuring 12.1 cm. The  main portal vein is patent with antegrade flow measuring 0.9 cm in  diameter.    Gallbladder: The gallbladder is well distended and of normal  morphology. Borderline wall thickening measuring 4 mm, likely  secondary to underlying liver disease. Biliary sludge present.  Bile Ducts: No intrahepatic biliary dilatation. Common bile duct  measures about 3 mm.    Pancreas: Pancreas was not well-visualized on this exam.    Spleen: Spleen was not well-visualized on this exam.    Aorta and IVC: The aorta and IVC were not well visualized on this  exam.    Fluid: No ascites in all 4 quadrants.    DOPPLER:     There is flow into the liver in the main portal vein:  40 cm/sec in the main portal vein  Retrograde at 18 cm/sec in the right portal vein  Left portal vein was not visualized on this exam.     There is flow into the liver in the hepatic artery:  124 cm/sec peak systolic flow  36 cm/sec minimum diastolic flow   0.71 resistive index     The stent velocities are  34 cm/sec at the portal vein  164 cm/sec in mid stent  115 cm/sec in the right hepatic vein distal shunt end.     The splenic vein is patent and flow is towards the liver.     The middle, and right hepatic veins are patent with flow towards the  IVC. Left hepatic vein  was not visualized in this exam.      Impression    Impression: Limited exam due to patient cooperation.  1. Patent TIPS with velocities within normal limits.  Ongoing sluggish  flow at the portal venous end of shunt, 34 cm/sec.  2. Cirrhotic configuration of the liver.  3. No ascites in all 4 quadrants.  4. Pancreas, spleen, left portal vein, and aorta were not visualized  with ultrasound.      I have personally reviewed the examination and initial interpretation  and I agree with the findings.    ELVIRA TAMAYO MD         SYSTEM ID:  [id number]       Discharge Medications   Discharge Medication List as of 12/28/2021  2:03 PM      CONTINUE these medications which have CHANGED    Details   insulin glargine (LANTUS PEN) 100 UNIT/ML pen Inject 8 Units Subcutaneous every morning Takes around 12noon, Disp-15 mL, R-0, No Print OutIf Lantus is not covered by insurance, may substitute Basaglar at same dose and frequency.           CONTINUE these medications which have NOT CHANGED    Details   acetaminophen (TYLENOL) 500 MG tablet Take 1 tablet (500 mg) by mouth every 6 hours as needed for mild pain, No Print Out      acetone urine (KETOSTIX) test strip 1 stripHistorical      albuterol (PROAIR HFA/PROVENTIL HFA/VENTOLIN HFA) 108 (90 Base) MCG/ACT inhaler Inhale 1-2 puffs into the lungs , Historical      amylase-lipase-protease (CREON 6) 0337-13658-91694 units CPEP Take 3 capsules by mouth 3 times daily (with meals), Historical      Apixaban Starter Pack (ELIQUIS DVT/PE STARTER PACK) 5 MG TBPK Take 10 mg by mouth 2 times daily for 7 days, THEN 5 mg 2 times daily for 23 days., Disp-1 each, R-0, E-Prescribe      atorvastatin (LIPITOR) 20 MG tablet Take 20 mg by mouth every morning , Historical      calcium carbonate (TUMS) 500 MG chewable tablet Take 1 tablet by mouth daily as needed for heartburn , Historical      calcium citrate-vitamin D (CITRACAL W/D) 250-100 MG-UNIT tablet Take 2 tablets by mouth 2 times daily (with meals),  Disp-336 tablet, R-3, E-Prescribe      Continuous Blood Gluc  (DEXCOM G6 ) [facility] Use as directed for continuous glucose monitoring., Historical      Continuous Blood Gluc Sensor (DEXCOM G6 SENSOR) MISC Use as directed for continuous glucose monitoring.  Change sensor every 10 days., Historical      Continuous Blood Gluc Transmit (DEXCOM G6 TRANSMITTER) MISC Use as directed for continuous glucose monitoring.  Change every 3 months., Historical      cyanocobalamin (VITAMIN B-12) 1000 MCG tablet Take 1,000 mcg by mouth every 7 days , Historical      Ferrous Sulfate (IRON) 325 (65 FE) MG tablet Take 1 tablet by mouth every morning , Historical      folic acid (FOLVITE) 1 MG tablet Take 1 mg by mouth every morning , Historical      hydrOXYzine (ATARAX) 25 MG tablet Take 25 mg by mouth 3 times daily as needed for anxiety, Historical      insulin aspart (NOVOLOG FLEXPEN) 100 UNIT/ML pen Inject 1u/50>200 every 4 hours as needed for high blood glucose. Up to 20 units daily.  Indications: Diabetes Mellitus, Historical      insulin pen needle (BD GRISEL U/F) 32G X 4 MM miscellaneous Inject 1 each SubcutaneousHistorical      lactulose (CHRONULAC) 10 GM/15ML solution TAKE 30 MLS BY MOUTH 3 TIMES DAILY, Disp-3784 mL, R-5, E-Prescribe      levothyroxine (SYNTHROID/LEVOTHROID) 200 MCG tablet Take 200 mcg by mouth, Historical      Multiple Vitamin (MULTIVITAMIN PO) Take 2 tablets by mouth every morning , Historical      omeprazole (PRILOSEC) 20 MG DR capsule Take 1 capsule (20 mg) by mouth daily, Disp-30 capsule, R-0, E-Prescribe      ondansetron (ZOFRAN-ODT) 4 MG ODT tab Place 1 tablet (4 mg) under the tongue every 6 hours as needed for nausea, Disp-120 tablet, R-1, E-Prescribe      rifampin (RIFADIN) 300 MG capsule TAKE 1 CAPSULE BY MOUTH EVERY DAY, Disp-90 capsule, R-3, E-Prescribe      rifaximin (XIFAXAN) 550 MG TABS tablet Take 1 tablet (550 mg) by mouth 2 times daily, Disp-60 tablet, R-11, E-PrescribeCan give a 3  month supply if easier and/or cheaper for patient.      Rimegepant Sulfate 75 MG TBDP Take 75 mg by mouth, Historical      simethicone (MYLICON) 80 MG chewable tablet Take 80 mg by mouth every 6 hours as needed for flatulence or cramping, Historical      sitagliptin (JANUVIA) 100 MG tablet Take 100 mg by mouth every morning , Historical      SUMAtriptan (IMITREX) 50 MG tablet Take 50 mg by mouth at onset of headache for migraine , Historical      topiramate (TOPAMAX) 50 MG tablet Take 50 mg by mouth daily , Historical      traZODone (DESYREL) 100 MG tablet Take  mg by mouth nightly as needed for sleep, Historical      valACYclovir (VALTREX) 1000 mg tablet Take 1,000 mg by mouth daily as needed (at onset of cold sore) , Historical      venlafaxine (EFFEXOR) 75 MG tablet Take 1 tablet (75 mg) by mouth At Bedtime, Disp-30 tablet, R-0, E-PrescribeRenewal requests go to Brooke Billy with [facility].      vitamin A 3 MG (67627 UNITS) capsule Take 10,000 Units by mouth every morning , Historical      vitamin C (ASCORBIC ACID) 1000 MG TABS Historical      vitamin D3 (CHOLECALCIFEROL) 50 mcg (2000 units) tablet Take 1 tablet (50 mcg) by mouth daily, Disp-90 tablet, R-3, E-Prescribe      vitamin E (TOCOPHEROL) 400 units (180 mg) capsule Take 400 Units by mouth every morning , Historical      metoclopramide (REGLAN) 10 MG tablet Take 10 mg by mouth, Historical         STOP taking these medications       ciprofloxacin (CIPRO) 250 MG tablet Comments:   Reason for Stopping:         furosemide (LASIX) 40 MG tablet Comments:   Reason for Stopping:         polyethylene glycol (MIRALAX) 17 g packet Comments:   Reason for Stopping:         prochlorperazine (COMPAZINE) 10 MG tablet Comments:   Reason for Stopping:         spironolactone (ALDACTONE) 100 MG tablet Comments:   Reason for Stopping:             Allergies   Allergies   Allergen Reactions     Methylprednisolone Hives     Droperidol Anxiety     Nsaids Other (See  Comments)     GI bleed.     Prednisone Anxiety, Hives and Rash

## 2021-12-29 NOTE — PROGRESS NOTES
Hepatology post hospital discharge RNCC assessment    Post hospital discharge follow up:      1. Admission diagnosis:  Altered mental status   2. Discharge diagnosis:  Hepatic encephalopathy, hypoglycemia    3. Admitted on: 12/26  4. Discharged on:  12/28    Medication Review    1. Medication review completed.    2. Discharge medication changes reviewed.   3. Patient did have medication changes made on discharge.  - Furosemide stopped on discharge.  - Spironolactone stopped on discharge.    Follow Up Post Discharge    1. Reviewed discharge instructions with patient. Follow up appointments reviewed and transportation to appointments confirmed.   2. Patient able to verbalized understanding of discharge instructions including medications and follow up.      3. Care coordinator role and contact information reviewed, and pt encouraged to call with questions or concerns.     Symptom Review    1.  Ascites:  No ascites found on imaging on 12/27.  2.  Edema:  Pt denied lower extremity edema. Pt's diuretics stopped on discharge.    3.  Encephalopathy: Due to noncompliance with lactulose. Pt stated that she has taken lactulose 30 ml 3 times so far today and has had 2 BMs. Reiterated the importance of titrating lactulose to achieve 3-4 BMs/day.  4. Depression and Anxiety: Pt endorsed feeling overwhelmed and depressed prior to admission and stated that she was not taking lactulose as much as she needed to. Emotional support provided and discussed the importance of utilizing pt's psychologist and support system as needed.        Plan    1. Pt will schedule follow up with PCP.  2. Hepatology follow up with Dr. Cook on 1/6.      Patient was given an opportunity to ask questions and have those questions answered to her satisfaction.  Patient verbalized understanding of instructions provided and agreed to plan of care.

## 2022-01-05 NOTE — PROGRESS NOTES
[facility] Solid Organ Transplant  Outpatient MNT: Transplant Waitlist    Current BMI: 27.46 (HT 65 in,  lbs/75 kg)  BMI is within recommendation of <45 for liver transplant    Frailty Assessment-- Frail (3/5 points) scored for reported exhaustion, reduced , and slowness     FraiLT-- Frail    Recommended Interventions to Address Frailty:  - Plan for PT once they are available; pt already on their list  - Consider biking at gym 1x/week with          Time Spent: 30 minutes  Visit Type: follow up (saw pt for txp eval via phone 10/2020)  Referring Physician: Joey   Pt accompanied by: her , Roly     History of previous txp: none     Nutrition Assessment  Terrible appetite, nothing is appealing. She has to force herself to eat. She does report that liquids are better tolerated than solids. She has tried various protein shakes and powders, which make her feel nauseous. She is prescribed zofran and was advised to take prior to eating, but this can only do so much as she can only take it q 8 h.     She has DM II and her BG keep dropping low, even with reduction in basal insulin. She is not normally taking meal time insulin d/t having hypoglycemia. She is working with her DM team to help optimize insulin and reduce frequency of hypoglycemia. She has tried having an HS snack (bagel) w/o insulin, spikes to 200 then drops to 100 rather rapidly.     Had RNY in 2006. She is wondering if she should continue all of her vitamins or if some can be eliminated. She is taking vit E 400, vit D, vit C, vit A, MVI, folic acid, iron, calcium with vit D, vit B12. No recent vitamin labs  Herbal Medicines/Supplements: none     Weight hx:   - weight up 30 lbs from last year at this time. She does not feel she is retaining water   - pt is unsure how to explain the weight gain, as she is eating less and is less active and she has even lost muscle mass with a few hospitalizations     Diet Recall  Breakfast Small bagel  with CC; Cheerios w/ banana; egg    Lunch Leftovers    Dinner Meatloaf (1 oz), green beans, cheesy scalloped potatoes (3 bites)   Snacks None    Beverages Yogurt, milk/juice, ice, fruit (smoothie); sprite or cherry coke zero, water, hint water    Alcohol Not asked    Dining out Not asked      Physical Activity  Low activity / tired since end of Nov- hospital stay   Plan for PT/OT to come out to home  [employer] fitness membership - 1x/week goal to ride the bike    Malnutrition   % Intake: reduced from baseline, but to unknown %- meets criteria for malnutrition based on pt report  % Weight Loss: None noted  Subcutaneous Fat Loss: None noted   Muscle Loss: Mild  Handgrip Strength: Reduced/meets criteria for malnutrition   Fluid Accumulation/Edema: None noted  Malnutrition Diagnosis: Moderate malnutrition in the context of chronic illness     Anthropometrics   IBW/%IBW: 125/132  Dosing wt: 135 lbs/61 kg    Estimated Nutrition Needs   Protein: 60-73 grams/day (1-1.2 g/kg) for repletion    Nutrition Diagnosis  Inadequate protein intake r/t poor appetite and lack of appeal for foods AEB diet recall shows likely not getting minimum est needs of 60 g protein/day, reduced muscle mass per pt report and per dynanometer.     Nutrition Intervention  Nutrition education provided:  1. Aim for 60 g protein/day. Provided some ideas. Focus on liquids/homemade smoothies rich in protein.   2. Activity as able; great idea to try the gym until able to get set up with PT.  3. Continue working with DM team as it sounds like she doesn't need much insulin anymore. We discussed some HS snack ideas to help maintain BG. Ensure you pair cho with a protein or fat during the day to help keep BG stable and prevent big swings.     Patient Understanding: Pt verbalized understanding of education provided.  Expected Engagement: Good  Follow-Up Plans: PRN     Nutrition Goals  Ideal intake of 60 g protein/day    Kelin Mcginnis RD, ANDRES,  CCTD

## 2022-01-06 ENCOUNTER — OFFICE VISIT (OUTPATIENT)
Dept: GASTROENTEROLOGY | Facility: CLINIC | Age: 52
End: 2022-01-06
Attending: INTERNAL MEDICINE
Payer: COMMERCIAL

## 2022-01-06 ENCOUNTER — ALLIED HEALTH/NURSE VISIT (OUTPATIENT)
Dept: TRANSPLANT | Facility: CLINIC | Age: 52
End: 2022-01-06
Attending: INTERNAL MEDICINE
Payer: COMMERCIAL

## 2022-01-06 ENCOUNTER — PATIENT OUTREACH (OUTPATIENT)
Dept: GASTROENTEROLOGY | Facility: CLINIC | Age: 52
End: 2022-01-06

## 2022-01-06 ENCOUNTER — LAB (OUTPATIENT)
Dept: LAB | Facility: CLINIC | Age: 52
End: 2022-01-06
Payer: COMMERCIAL

## 2022-01-06 VITALS
HEART RATE: 74 BPM | BODY MASS INDEX: 27.46 KG/M2 | SYSTOLIC BLOOD PRESSURE: 108 MMHG | WEIGHT: 165 LBS | OXYGEN SATURATION: 100 % | DIASTOLIC BLOOD PRESSURE: 75 MMHG

## 2022-01-06 DIAGNOSIS — K74.60 LIVER CIRRHOSIS SECONDARY TO NASH (H): Primary | ICD-10-CM

## 2022-01-06 DIAGNOSIS — K74.60 CIRRHOSIS OF LIVER WITH ASCITES, UNSPECIFIED HEPATIC CIRRHOSIS TYPE (H): Primary | ICD-10-CM

## 2022-01-06 DIAGNOSIS — K75.81 NASH (NONALCOHOLIC STEATOHEPATITIS): ICD-10-CM

## 2022-01-06 DIAGNOSIS — R18.8 CIRRHOSIS OF LIVER WITH ASCITES, UNSPECIFIED HEPATIC CIRRHOSIS TYPE (H): Primary | ICD-10-CM

## 2022-01-06 DIAGNOSIS — K75.81 LIVER CIRRHOSIS SECONDARY TO NASH (H): ICD-10-CM

## 2022-01-06 DIAGNOSIS — K75.81 LIVER CIRRHOSIS SECONDARY TO NASH (H): Primary | ICD-10-CM

## 2022-01-06 DIAGNOSIS — K74.60 LIVER CIRRHOSIS SECONDARY TO NASH (H): ICD-10-CM

## 2022-01-06 DIAGNOSIS — E55.9 VITAMIN D DEFICIENCY: ICD-10-CM

## 2022-01-06 LAB
ALBUMIN SERPL-MCNC: 2.2 G/DL (ref 3.4–5)
ALP SERPL-CCNC: 207 U/L (ref 40–150)
ALT SERPL W P-5'-P-CCNC: 29 U/L (ref 0–50)
ANION GAP SERPL CALCULATED.3IONS-SCNC: 5 MMOL/L (ref 3–14)
AST SERPL W P-5'-P-CCNC: 46 U/L (ref 0–45)
BILIRUB DIRECT SERPL-MCNC: 0.7 MG/DL (ref 0–0.2)
BILIRUB SERPL-MCNC: 1.2 MG/DL (ref 0.2–1.3)
BUN SERPL-MCNC: 6 MG/DL (ref 7–30)
CALCIUM SERPL-MCNC: 7.6 MG/DL (ref 8.5–10.1)
CHLORIDE BLD-SCNC: 108 MMOL/L (ref 94–109)
CO2 SERPL-SCNC: 27 MMOL/L (ref 20–32)
CREAT SERPL-MCNC: 0.72 MG/DL (ref 0.52–1.04)
ERYTHROCYTE [DISTWIDTH] IN BLOOD BY AUTOMATED COUNT: 18 % (ref 10–15)
GFR SERPL CREATININE-BSD FRML MDRD: >90 ML/MIN/1.73M2
GLUCOSE BLD-MCNC: 302 MG/DL (ref 70–99)
HCT VFR BLD AUTO: 29.4 % (ref 35–47)
HGB BLD-MCNC: 9.5 G/DL (ref 11.7–15.7)
INR PPP: 2.01 (ref 0.85–1.15)
MCH RBC QN AUTO: 32.9 PG (ref 26.5–33)
MCHC RBC AUTO-ENTMCNC: 32.3 G/DL (ref 31.5–36.5)
MCV RBC AUTO: 102 FL (ref 78–100)
PLATELET # BLD AUTO: 83 10E3/UL (ref 150–450)
POTASSIUM BLD-SCNC: 3.6 MMOL/L (ref 3.4–5.3)
PROT SERPL-MCNC: 5.3 G/DL (ref 6.8–8.8)
RBC # BLD AUTO: 2.89 10E6/UL (ref 3.8–5.2)
SODIUM SERPL-SCNC: 140 MMOL/L (ref 133–144)
WBC # BLD AUTO: 2.6 10E3/UL (ref 4–11)

## 2022-01-06 PROCEDURE — 85610 PROTHROMBIN TIME: CPT | Performed by: PATHOLOGY

## 2022-01-06 PROCEDURE — 82248 BILIRUBIN DIRECT: CPT | Performed by: PATHOLOGY

## 2022-01-06 PROCEDURE — 85027 COMPLETE CBC AUTOMATED: CPT | Performed by: PATHOLOGY

## 2022-01-06 PROCEDURE — 36415 COLL VENOUS BLD VENIPUNCTURE: CPT | Performed by: PATHOLOGY

## 2022-01-06 PROCEDURE — 99215 OFFICE O/P EST HI 40 MIN: CPT | Performed by: INTERNAL MEDICINE

## 2022-01-06 PROCEDURE — 80053 COMPREHEN METABOLIC PANEL: CPT | Performed by: PATHOLOGY

## 2022-01-06 NOTE — PATIENT INSTRUCTIONS
Vivek Davis,  Nice meeting with you today.   Some ideas for bed time snacks that hopefully won't spike your sugar as much as a bagel:  - Fair life chocolate milk  - Drinkable yogurt (Chobani) or the smoothies we talked about  - Chocolate or yogurt covered nuts   - Trail mix with dried fruit   - Cottage cheese with fruit; yogurt with fruit/nuts/pankaj seeds  - Crackers w/ hummus  - Overnight oats     You could try a smoothie with yogurt (Greek, milk, fruit, pankaj/flax seed)- this fiber will help delay a big blood sugar spike. You could always try unflavored protein powder or a Prostat/Liquacel (small volume protein supplement)    For nausea:  - Gin Gins ash chew  - sucking on a lemon/lemon heads  - Lifesavers hard mints, altoids, mint or ash tea     I messaged the nurse, Eli, to see if we can add on vitamin labs next time you get labs done to see if you need all your supplements. However, I would stop the vitamin C. With your gastric bypass history, you are at higher risk for developing kidney stones and high dose vitamin C can make that risk higher.

## 2022-01-06 NOTE — PROGRESS NOTES
Met with pt at follow up appointment with Dr. Cook. Pt reported weight gain despite poor appetite and is wondering if she has ascites. Pt is not currently on diuretics and abdominal ultrasound on 12/27 showed no ascites. Denied lower extremity edema and shortness of breath. Pt has not needed a thoracentesis since 11/29. Abdominal ultrasound ordered to assess for ascites per Dr. Cook and order faxed to [facility] per pt request. Pt plans to schedule ultrasound on 1/7.     Denied confusion. Pt reported compliance with xifaxan and lactulose. Pt is titrating lactulose to achieve 3-4 BMs/day.     Will look for ultrasound results and follow up with pt in 1 week. Pt verbalized understanding and is agreeable to plan of care.

## 2022-01-06 NOTE — PROGRESS NOTES
SUBJECTIVE:  I had the pleasure of seeing Susan Puckett for followup in the Liver Transplant Clinic at the [facility] on 01/06/2022.  Ms. Puckett returns for followup of cirrhosis, most likely caused by nonalcoholic fatty liver disease.  She is status post TIPS, which has done a very good job of controlling ascites and hepatic hydrothorax.  Unfortunately, she has been hospitalized twice for hepatic encephalopathy since I saw her last.    She still continues to not feel well.  She denies any abdominal pain, itching or skin rash, but does complain of marked fatigue.  She does also complain of increased abdominal girth, but no lower extremity edema.  She has not had any gastrointestinal bleeding and has not since discharge experienced any encephalopathy.    She denies any fevers or chills, cough or shortness of breath.  She is fully vaccinated against COVID-19.  She does complain of some nausea without vomiting and is having 3-4 bowel movements per day on her current dose of lactulose.  She is also on rifaximin.  She reports her appetite is poor, but she has been gaining weight.    Current Outpatient Medications   Medication     acetaminophen (TYLENOL) 500 MG tablet     acetone urine (KETOSTIX) test strip     amylase-lipase-protease (CREON 6) [id number] units CPEP     Apixaban Starter Pack (ELIQUIS DVT/PE STARTER PACK) 5 MG TBPK     atorvastatin (LIPITOR) 20 MG tablet     calcium carbonate (TUMS) 500 MG chewable tablet     calcium citrate-vitamin D (CITRACAL W/D) 250-100 MG-UNIT tablet     Continuous Blood Gluc  (DEXCOM G6 ) LUNA     Continuous Blood Gluc Sensor (DEXCOM G6 SENSOR) MISC     Continuous Blood Gluc Transmit (DEXCOM G6 TRANSMITTER) MISC     cyanocobalamin (VITAMIN B-12) 1000 MCG tablet     Ferrous Sulfate (IRON) 325 (65 FE) MG tablet     folic acid (FOLVITE) 1 MG tablet     hydrOXYzine (ATARAX) 25 MG tablet     insulin aspart (NOVOLOG FLEXPEN) 100 UNIT/ML pen      insulin glargine (LANTUS PEN) 100 UNIT/ML pen     insulin pen needle (BD GRISEL U/F) 32G X 4 MM miscellaneous     lactulose (CHRONULAC) 10 GM/15ML solution     levothyroxine (SYNTHROID/LEVOTHROID) 200 MCG tablet     metoclopramide (REGLAN) 10 MG tablet     Multiple Vitamin (MULTIVITAMIN PO)     omeprazole (PRILOSEC) 20 MG DR capsule     ondansetron (ZOFRAN-ODT) 4 MG ODT tab     rifampin (RIFADIN) 300 MG capsule     rifaximin (XIFAXAN) 550 MG TABS tablet     Rimegepant Sulfate 75 MG TBDP     simethicone (MYLICON) 80 MG chewable tablet     sitagliptin (JANUVIA) 100 MG tablet     SUMAtriptan (IMITREX) 50 MG tablet     topiramate (TOPAMAX) 50 MG tablet     traZODone (DESYREL) 100 MG tablet     valACYclovir (VALTREX) 1000 mg tablet     venlafaxine (EFFEXOR) 75 MG tablet     vitamin A 3 MG (79602 UNITS) capsule     vitamin C (ASCORBIC ACID) 1000 MG TABS     vitamin D3 (CHOLECALCIFEROL) 50 mcg (2000 units) tablet     vitamin E (TOCOPHEROL) 400 units (180 mg) capsule     albuterol (PROAIR HFA/PROVENTIL HFA/VENTOLIN HFA) 108 (90 Base) MCG/ACT inhaler     No current facility-administered medications for this visit.     /75   Pulse 74   Wt 74.8 kg (165 lb)   SpO2 100%   BMI 27.46 kg/m      PHYSICAL EXAMINATION:    GENERAL:  She actually looks relatively well.  HEENT EXAMINATION:  Shows no scleral icterus or temporal muscle wasting.  CHEST:  Clear.  ABDOMINAL EXAMINATION:  It is difficult to appreciate whether ascites is present.  Certainly, her abdomen is soft and not tense.  Her liver is 10-11 cm in span with a slightly prominent left lobe.  No spleen tip is palpable.  EXTREMITY EXAMINATION:  Shows no edema.  SKIN EXAMINATION:  Shows no stigmata of chronic liver disease.  NEUROLOGIC EXAMINATION:  Shows no asterixis.    Recent Results (from the past 168 hour(s))   INR    Collection Time: 01/06/22 10:34 AM   Result Value Ref Range    INR 2.01 (H) 0.85 - 1.15   CBC with platelets    Collection Time: 01/06/22 10:34 AM    Result Value Ref Range    WBC Count 2.6 (L) 4.0 - 11.0 10e3/uL    RBC Count 2.89 (L) 3.80 - 5.20 10e6/uL    Hemoglobin 9.5 (L) 11.7 - 15.7 g/dL    Hematocrit 29.4 (L) 35.0 - 47.0 %     (H) 78 - 100 fL    MCH 32.9 26.5 - 33.0 pg    MCHC 32.3 31.5 - 36.5 g/dL    RDW 18.0 (H) 10.0 - 15.0 %    Platelet Count 83 (L) 150 - 450 10e3/uL   Basic metabolic panel    Collection Time: 01/06/22 10:35 AM   Result Value Ref Range    Sodium 140 133 - 144 mmol/L    Potassium 3.6 3.4 - 5.3 mmol/L    Chloride 108 94 - 109 mmol/L    Carbon Dioxide (CO2) 27 20 - 32 mmol/L    Anion Gap 5 3 - 14 mmol/L    Urea Nitrogen 6 (L) 7 - 30 mg/dL    Creatinine 0.72 0.52 - 1.04 mg/dL    Calcium 7.6 (L) 8.5 - 10.1 mg/dL    Glucose 302 (H) 70 - 99 mg/dL    GFR Estimate >90 >60 mL/min/1.73m2   Hepatic panel    Collection Time: 01/06/22 10:35 AM   Result Value Ref Range    Bilirubin Total 1.2 0.2 - 1.3 mg/dL    Bilirubin Direct 0.7 (H) 0.0 - 0.2 mg/dL    Protein Total 5.3 (L) 6.8 - 8.8 g/dL    Albumin 2.2 (L) 3.4 - 5.0 g/dL    Alkaline Phosphatase 207 (H) 40 - 150 U/L    AST 46 (H) 0 - 45 U/L    ALT 29 0 - 50 U/L      MELD-Na score: 15 at 1/6/2022 10:35 AM  MELD score: 15 at 1/6/2022 10:35 AM  Calculated from:  Serum Creatinine: 0.72 mg/dL (Using min of 1 mg/dL) at 1/6/2022 10:35 AM  Serum Sodium: 140 mmol/L (Using max of 137 mmol/L) at 1/6/2022 10:35 AM  Total Bilirubin: 1.2 mg/dL at 1/6/2022 10:35 AM  INR(ratio): 2.01 at 1/6/2022 10:34 AM  Age: 51 years    IMPRESSION:  My impression is that 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  Fortunately, her MELD score remains quite low, and probably over estimates her MELD score based on the fact that she is on Eliquis.  She does have a couple live donors that are still potentially active.    Otherwise, her disease is fairly well compensated.  I will go ahead and get an ultrasound to see whether any ascites is present.  She is up to date with regard to vaccines and other screening.  My plan will  be to see her back in the clinic in 3 months.    Thank you very much for allowing me to participate in the care of this patient.  If you have any questions regarding my recommendations, please do not hesitate to contact me.         Raphael Cook MD      Professor of Medicine  [employer]      Executive Medical Director, Solid Organ Transplant Program  [facility]

## 2022-01-06 NOTE — LETTER
1/6/2022     RE: Susan Puckett  [address] [id number]    Dear Colleague,    Thank you for referring your patient, Susan Puckett, to the [facility] [place]. Please see a copy of my visit note below.    SUBJECTIVE:  I had the pleasure of seeing Susan Puckett for followup in the Liver Transplant Clinic at the [facility] on 01/06/2022.  Ms. Puckett returns for followup of cirrhosis, most likely caused by nonalcoholic fatty liver disease.  She is status post TIPS, which has done a very good job of controlling ascites and hepatic hydrothorax.  Unfortunately, she has been hospitalized twice for hepatic encephalopathy since I saw her last.    She still continues to not feel well.  She denies any abdominal pain, itching or skin rash, but does complain of marked fatigue.  She does also complain of increased abdominal girth, but no lower extremity edema.  She has not had any gastrointestinal bleeding and has not since discharge experienced any encephalopathy.    She denies any fevers or chills, cough or shortness of breath.  She is fully vaccinated against COVID-19.  She does complain of some nausea without vomiting and is having 3-4 bowel movements per day on her current dose of lactulose.  She is also on rifaximin.  She reports her appetite is poor, but she has been gaining weight.    Current Outpatient Medications   Medication     acetaminophen (TYLENOL) 500 MG tablet     acetone urine (KETOSTIX) test strip     amylase-lipase-protease (CREON 6) [id number] units CPEP     Apixaban Starter Pack (ELIQUIS DVT/PE STARTER PACK) 5 MG TBPK     atorvastatin (LIPITOR) 20 MG tablet     calcium carbonate (TUMS) 500 MG chewable tablet     calcium citrate-vitamin D (CITRACAL W/D) 250-100 MG-UNIT tablet     Continuous Blood Gluc  (DEXCOM G6 ) LUNA     Continuous Blood Gluc Sensor (DEXCOM G6 SENSOR) MISC     Continuous Blood Gluc Transmit  (DEXCOM G6 TRANSMITTER) MISC     cyanocobalamin (VITAMIN B-12) 1000 MCG tablet     Ferrous Sulfate (IRON) 325 (65 FE) MG tablet     folic acid (FOLVITE) 1 MG tablet     hydrOXYzine (ATARAX) 25 MG tablet     insulin aspart (NOVOLOG FLEXPEN) 100 UNIT/ML pen     insulin glargine (LANTUS PEN) 100 UNIT/ML pen     insulin pen needle (BD GRISEL U/F) 32G X 4 MM miscellaneous     lactulose (CHRONULAC) 10 GM/15ML solution     levothyroxine (SYNTHROID/LEVOTHROID) 200 MCG tablet     metoclopramide (REGLAN) 10 MG tablet     Multiple Vitamin (MULTIVITAMIN PO)     omeprazole (PRILOSEC) 20 MG DR capsule     ondansetron (ZOFRAN-ODT) 4 MG ODT tab     rifampin (RIFADIN) 300 MG capsule     rifaximin (XIFAXAN) 550 MG TABS tablet     Rimegepant Sulfate 75 MG TBDP     simethicone (MYLICON) 80 MG chewable tablet     sitagliptin (JANUVIA) 100 MG tablet     SUMAtriptan (IMITREX) 50 MG tablet     topiramate (TOPAMAX) 50 MG tablet     traZODone (DESYREL) 100 MG tablet     valACYclovir (VALTREX) 1000 mg tablet     venlafaxine (EFFEXOR) 75 MG tablet     vitamin A 3 MG (55890 UNITS) capsule     vitamin C (ASCORBIC ACID) 1000 MG TABS     vitamin D3 (CHOLECALCIFEROL) 50 mcg (2000 units) tablet     vitamin E (TOCOPHEROL) 400 units (180 mg) capsule     albuterol (PROAIR HFA/PROVENTIL HFA/VENTOLIN HFA) 108 (90 Base) MCG/ACT inhaler     No current facility-administered medications for this visit.     /75   Pulse 74   Wt 74.8 kg (165 lb)   SpO2 100%   BMI 27.46 kg/m      PHYSICAL EXAMINATION:    GENERAL:  She actually looks relatively well.  HEENT EXAMINATION:  Shows no scleral icterus or temporal muscle wasting.  CHEST:  Clear.  ABDOMINAL EXAMINATION:  It is difficult to appreciate whether ascites is present.  Certainly, her abdomen is soft and not tense.  Her liver is 10-11 cm in span with a slightly prominent left lobe.  No spleen tip is palpable.  EXTREMITY EXAMINATION:  Shows no edema.  SKIN EXAMINATION:  Shows no stigmata of chronic liver  disease.  NEUROLOGIC EXAMINATION:  Shows no asterixis.    Recent Results (from the past 168 hour(s))   INR    Collection Time: 01/06/22 10:34 AM   Result Value Ref Range    INR 2.01 (H) 0.85 - 1.15   CBC with platelets    Collection Time: 01/06/22 10:34 AM   Result Value Ref Range    WBC Count 2.6 (L) 4.0 - 11.0 10e3/uL    RBC Count 2.89 (L) 3.80 - 5.20 10e6/uL    Hemoglobin 9.5 (L) 11.7 - 15.7 g/dL    Hematocrit 29.4 (L) 35.0 - 47.0 %     (H) 78 - 100 fL    MCH 32.9 26.5 - 33.0 pg    MCHC 32.3 31.5 - 36.5 g/dL    RDW 18.0 (H) 10.0 - 15.0 %    Platelet Count 83 (L) 150 - 450 10e3/uL   Basic metabolic panel    Collection Time: 01/06/22 10:35 AM   Result Value Ref Range    Sodium 140 133 - 144 mmol/L    Potassium 3.6 3.4 - 5.3 mmol/L    Chloride 108 94 - 109 mmol/L    Carbon Dioxide (CO2) 27 20 - 32 mmol/L    Anion Gap 5 3 - 14 mmol/L    Urea Nitrogen 6 (L) 7 - 30 mg/dL    Creatinine 0.72 0.52 - 1.04 mg/dL    Calcium 7.6 (L) 8.5 - 10.1 mg/dL    Glucose 302 (H) 70 - 99 mg/dL    GFR Estimate >90 >60 mL/min/1.73m2   Hepatic panel    Collection Time: 01/06/22 10:35 AM   Result Value Ref Range    Bilirubin Total 1.2 0.2 - 1.3 mg/dL    Bilirubin Direct 0.7 (H) 0.0 - 0.2 mg/dL    Protein Total 5.3 (L) 6.8 - 8.8 g/dL    Albumin 2.2 (L) 3.4 - 5.0 g/dL    Alkaline Phosphatase 207 (H) 40 - 150 U/L    AST 46 (H) 0 - 45 U/L    ALT 29 0 - 50 U/L      MELD-Na score: 15 at 1/6/2022 10:35 AM  MELD score: 15 at 1/6/2022 10:35 AM  Calculated from:  Serum Creatinine: 0.72 mg/dL (Using min of 1 mg/dL) at 1/6/2022 10:35 AM  Serum Sodium: 140 mmol/L (Using max of 137 mmol/L) at 1/6/2022 10:35 AM  Total Bilirubin: 1.2 mg/dL at 1/6/2022 10:35 AM  INR(ratio): 2.01 at 1/6/2022 10:34 AM  Age: 51 years    IMPRESSION:  My impression is that 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  Fortunately, her MELD score remains quite low, and probably over estimates her MELD score based on the fact that she is on Eliquis.  She does have a couple  live donors that are still potentially active.    Otherwise, her disease is fairly well compensated.  I will go ahead and get an ultrasound to see whether any ascites is present.  She is up to date with regard to vaccines and other screening.  My plan will be to see her back in the clinic in 3 months.    Thank you very much for allowing me to participate in the care of this patient.  If you have any questions regarding my recommendations, please do not hesitate to contact me.         Raphael Cook MD      Professor of Medicine  [employer]      Executive Medical Director, Solid Organ Transplant Program  [facility]

## 2022-01-10 ENCOUNTER — OFFICE VISIT (OUTPATIENT)
Dept: VASCULAR SURGERY | Facility: CLINIC | Age: 52
End: 2022-01-10
Payer: COMMERCIAL

## 2022-01-10 ENCOUNTER — LAB (OUTPATIENT)
Dept: LAB | Facility: CLINIC | Age: 52
End: 2022-01-10
Attending: RADIOLOGY
Payer: COMMERCIAL

## 2022-01-10 VITALS — HEART RATE: 70 BPM | OXYGEN SATURATION: 100 % | SYSTOLIC BLOOD PRESSURE: 97 MMHG | DIASTOLIC BLOOD PRESSURE: 67 MMHG

## 2022-01-10 DIAGNOSIS — K75.81 NASH (NONALCOHOLIC STEATOHEPATITIS): ICD-10-CM

## 2022-01-10 DIAGNOSIS — K75.81 LIVER CIRRHOSIS SECONDARY TO NASH (H): ICD-10-CM

## 2022-01-10 DIAGNOSIS — Z95.828 S/P TIPS (TRANSJUGULAR INTRAHEPATIC PORTOSYSTEMIC SHUNT): Primary | ICD-10-CM

## 2022-01-10 DIAGNOSIS — Z95.828 S/P TIPS (TRANSJUGULAR INTRAHEPATIC PORTOSYSTEMIC SHUNT): ICD-10-CM

## 2022-01-10 DIAGNOSIS — K74.60 LIVER CIRRHOSIS SECONDARY TO NASH (H): ICD-10-CM

## 2022-01-10 LAB
AFP SERPL-MCNC: 2 UG/L (ref 0–8)
ALBUMIN SERPL-MCNC: 2.1 G/DL (ref 3.4–5)
ALP SERPL-CCNC: 192 U/L (ref 40–150)
ALT SERPL W P-5'-P-CCNC: 26 U/L (ref 0–50)
ANION GAP SERPL CALCULATED.3IONS-SCNC: 10 MMOL/L (ref 3–14)
AST SERPL W P-5'-P-CCNC: 37 U/L (ref 0–45)
BILIRUB DIRECT SERPL-MCNC: 0.6 MG/DL (ref 0–0.2)
BILIRUB SERPL-MCNC: 1 MG/DL (ref 0.2–1.3)
BUN SERPL-MCNC: 5 MG/DL (ref 7–30)
CALCIUM SERPL-MCNC: 7.4 MG/DL (ref 8.5–10.1)
CHLORIDE BLD-SCNC: 110 MMOL/L (ref 94–109)
CO2 SERPL-SCNC: 26 MMOL/L (ref 20–32)
CREAT SERPL-MCNC: 0.81 MG/DL (ref 0.52–1.04)
DEPRECATED CALCIDIOL+CALCIFEROL SERPL-MC: 8 UG/L (ref 20–75)
ERYTHROCYTE [DISTWIDTH] IN BLOOD BY AUTOMATED COUNT: 17.4 % (ref 10–15)
GFR SERPL CREATININE-BSD FRML MDRD: 88 ML/MIN/1.73M2
GLUCOSE BLD-MCNC: 139 MG/DL (ref 70–99)
HBA1C MFR BLD: 5.6 % (ref 0–5.6)
HCT VFR BLD AUTO: 27.9 % (ref 35–47)
HGB BLD-MCNC: 9 G/DL (ref 11.7–15.7)
INR PPP: 2.09 (ref 0.85–1.15)
MCH RBC QN AUTO: 32.8 PG (ref 26.5–33)
MCHC RBC AUTO-ENTMCNC: 32.3 G/DL (ref 31.5–36.5)
MCV RBC AUTO: 102 FL (ref 78–100)
PHOSPHATE SERPL-MCNC: 2.5 MG/DL (ref 2.5–4.5)
PLATELET # BLD AUTO: 61 10E3/UL (ref 150–450)
POTASSIUM BLD-SCNC: 3.5 MMOL/L (ref 3.4–5.3)
PROT SERPL-MCNC: 5.2 G/DL (ref 6.8–8.8)
RBC # BLD AUTO: 2.74 10E6/UL (ref 3.8–5.2)
SODIUM SERPL-SCNC: 146 MMOL/L (ref 133–144)
VIT B12 SERPL-MCNC: 1768 PG/ML (ref 193–986)
WBC # BLD AUTO: 3 10E3/UL (ref 4–11)

## 2022-01-10 PROCEDURE — 84590 ASSAY OF VITAMIN A: CPT | Mod: 90 | Performed by: PATHOLOGY

## 2022-01-10 PROCEDURE — 36415 COLL VENOUS BLD VENIPUNCTURE: CPT | Performed by: PATHOLOGY

## 2022-01-10 PROCEDURE — 80321 ALCOHOLS BIOMARKERS 1OR 2: CPT | Mod: 90 | Performed by: PATHOLOGY

## 2022-01-10 PROCEDURE — 84100 ASSAY OF PHOSPHORUS: CPT | Performed by: PATHOLOGY

## 2022-01-10 PROCEDURE — 99215 OFFICE O/P EST HI 40 MIN: CPT | Performed by: RADIOLOGY

## 2022-01-10 PROCEDURE — 82248 BILIRUBIN DIRECT: CPT | Performed by: PATHOLOGY

## 2022-01-10 PROCEDURE — 84446 ASSAY OF VITAMIN E: CPT | Mod: 90 | Performed by: PATHOLOGY

## 2022-01-10 PROCEDURE — 82105 ALPHA-FETOPROTEIN SERUM: CPT | Mod: 90 | Performed by: PATHOLOGY

## 2022-01-10 PROCEDURE — 85610 PROTHROMBIN TIME: CPT | Performed by: PATHOLOGY

## 2022-01-10 PROCEDURE — 82607 VITAMIN B-12: CPT | Performed by: PATHOLOGY

## 2022-01-10 PROCEDURE — 80053 COMPREHEN METABOLIC PANEL: CPT | Performed by: PATHOLOGY

## 2022-01-10 PROCEDURE — 99000 SPECIMEN HANDLING OFFICE-LAB: CPT | Performed by: PATHOLOGY

## 2022-01-10 PROCEDURE — 83036 HEMOGLOBIN GLYCOSYLATED A1C: CPT | Performed by: PATHOLOGY

## 2022-01-10 PROCEDURE — 82306 VITAMIN D 25 HYDROXY: CPT | Mod: 90 | Performed by: PATHOLOGY

## 2022-01-10 PROCEDURE — 85027 COMPLETE CBC AUTOMATED: CPT | Performed by: PATHOLOGY

## 2022-01-10 ASSESSMENT — ENCOUNTER SYMPTOMS
INCREASED ENERGY: 1
SWOLLEN GLANDS: 0
ALTERED TEMPERATURE REGULATION: 0
INSOMNIA: 1
POLYDIPSIA: 0
BRUISES/BLEEDS EASILY: 1
CHILLS: 0
PANIC: 0
DEPRESSION: 1
FATIGUE: 1
DECREASED APPETITE: 1
WEIGHT GAIN: 1
NIGHT SWEATS: 0
NERVOUS/ANXIOUS: 1
DECREASED CONCENTRATION: 0
FEVER: 0
POLYPHAGIA: 0
HALLUCINATIONS: 0
WEIGHT LOSS: 0

## 2022-01-10 NOTE — NURSING NOTE
Chief Complaint   Patient presents with     Follow Up     1 month TIPS follow up.  Labs completed today.  Pt noted she has been hospitalized x2 since procedure fot hepatic encephelopathy.  She also noted she has had thorocentesis since procedure.        Medications and allergies reconciled.  Vitals collected.    Jody Shirley LPN

## 2022-01-10 NOTE — LETTER
1/10/2022       RE: Susan Puckett  [address] [facility] [address]     Dear Colleague,    Thank you for referring your patient, Susan Puckett, to the [facility] [place] [facility]. Please see a copy of my visit note below.        INTERVENTIONAL RADIOLOGY FOLLOW-UP NOTE     Name: Susan Puckett  Age: 51 year old   Referring Physician: Dr. Cook   REASON FOR VISIT: Status post TIPS    HPI: Ms. 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  Her ascites and hepatic hydrothorax has been very well controlled since placement of the TIPS.  However she has had episodes of hepatic encephalopathy, for which she was hospitalized (twice) lately.  I was consulted regarding TIPS reduction procedure during her most recent hospitalization, which after discussion with hepatology, we decided to defer this to a more aggressive medical management of the encephalopathy.  Since her last discharge before the New Year's (12/28/2021) she has been very compliant with her lactulose and rifaximin, with 3-4 bowel movements per day.  Her thinking has remained clear, which is also confirmed by her spouse Rivera.  However she shares with me that she does not feel great, as her appetite remains poor and she continues to feel fatigued.  She reports a slight increase in her abdominal girth, but nowhere close to her and she had ascites.  She believes she has gained some weight.  No lower extremity swelling.  No abdominal pain, itching or skin changes.  No episodes of GI bleeding.  She denies fevers, chills, cough or shortness of breath.    PAST MEDICAL HISTORY:   Past Medical History:   Diagnosis Date     Anemia      Anxiety      Cold sore      Depressive  disorder      Diabetes (H)     Type 2 DM/No Insulin      Encephalopathy      Esophageal varices without bleeding (H)     grade I     History of blood transfusion     10/2019     History of seizure     diabetic seizure     Insomnia      Liver cirrhosis secondary to CUETO (H) 05/28/2020     Migraine      Pleural effusion      Thrombocytopenia (H)      Thyroid disease        PAST SURGICAL HISTORY:   Past Surgical History:   Procedure Laterality Date     APPENDECTOMY      1989     COLONOSCOPY      1/2020 at [facility]      ENT SURGERY  1998    tempanoplasty     GI SURGERY      EGD August 2020 at [facility]      GYN SURGERY  2010    laparoscopic ablation     IR TRANSVEN INTRAHEPATIC PORTOSYST SHUNT  11/5/2021     MAMMOPLASTY REDUCTION BILATERAL  2004     AR STAB PHELBECTOMY VARICOSE VEINS, LESS THAN 10 INCISIONS, ONE EXTREMITY  2020     RNY gastric bypass  01/2006    retoocolic, retrogastric, [facility]     TONSILLECTOMY & ADENOIDECTOMY Bilateral 1978     WISDOM TEETH EXTRACTION         FAMILY HISTORY:   Family History   Problem Relation Age of Onset     Anesthesia Reaction Nephew         PONV     Bleeding Disorder No family hx of      Clotting Disorder No family hx of        SOCIAL HISTORY:   Social History     Tobacco Use     Smoking status: Never Smoker     Smokeless tobacco: Never Used   Substance Use Topics     Alcohol use: Not Currently     Comment: Last drink was in 2017       PROBLEM LIST:   Patient Active Problem List    Diagnosis Date Noted     Altered mental status, unspecified altered mental status type 12/26/2021     Priority: Medium     Decompensated hepatic cirrhosis (H) 12/06/2021     Priority: Medium     Abdominal pain, epigastric 11/06/2021     Priority: Medium     Hyperglycemia 11/06/2021     Priority: Medium     Cirrhosis of liver without ascites, unspecified hepatic cirrhosis type (H) 11/06/2021     Priority: Medium     Exposure to COVID-19 virus 11/22/2020     Priority: Medium     CUETO  (nonalcoholic steatohepatitis) 10/25/2020     Priority: Medium     Acute kidney injury (nontraumatic) (H) 09/09/2020     Priority: Medium     Hepatic encephalopathy (H) 09/09/2020     Priority: Medium     Lactate blood increase 09/09/2020     Priority: Medium     Moderate dehydration 09/09/2020     Priority: Medium     Other headache syndrome 08/23/2020     Priority: Medium     Nausea 08/23/2020     Priority: Medium     Chronic peritoneal effusion 08/11/2020     Priority: Medium     Pleural effusion associated with hepatic disorder 08/11/2020     Priority: Medium     Hydrothorax 07/29/2020     Priority: Medium     Pancytopenia (H) 07/29/2020     Priority: Medium     Brow ptosis, bilateral 07/22/2020     Priority: Medium     Ptosis of eyelid 07/22/2020     Priority: Medium     Added automatically from request for surgery [id number]  Added automatically from request for surgery [id number]       Visual field defect 07/22/2020     Priority: Medium     Added automatically from request for surgery [id number]       Non-alcoholic cirrhosis (H) 07/09/2020     Priority: Medium     Ascites 06/04/2020     Priority: Medium     Chronic diarrhea 05/28/2020     Priority: Medium     Endometriosis 05/28/2020     Priority: Medium     Liver cirrhosis secondary to CUETO (H) 05/28/2020     Priority: Medium     History of gastric ulcer 05/28/2020     Priority: Medium     With acute hemorrhage, 2019       Benign tumor of colon 01/22/2020     Priority: Medium     Tubular adenoma of colon 01/22/2020     Priority: Medium     Colitis 10/13/2019     Priority: Medium     Anxiety 12/16/2018     Priority: Medium     Insomnia 12/17/2015     Priority: Medium     Infertility management 11/05/2015     Priority: Medium     IVF through CRM       Vitamin D deficiency 10/27/2015     Priority: Medium     History of gastric bypass 10/24/2014     Priority: Medium     HSV-1 (herpes simplex virus 1) infection 10/24/2014     Priority: Medium     Allergic rhinitis 10/24/2014      Priority: Medium     fall allergies, seem to be improving, no meds currently       Type 2 diabetes mellitus without complication, without long-term current use of insulin (H) 10/24/2014     Priority: Medium     H/O gastric bypass 10/24/2014     Priority: Medium     Raynaud phenomenon 2013     Priority: Medium     fingers/toes numb if cold       Iron deficiency anemia 2013     Priority: Medium     Major depressive disorder, recurrent episode, moderate (H) 2009     Priority: Medium     fluoxetine       Hypothyroidism 2003     Priority: Medium     Primary hypothyroidism 2003     Priority: Medium     Hypothyroidism Acquired         MEDICATIONS:   Prescription Medications as of 2022       Rx Number Disp Refills Start End Last Dispensed Date Next Fill Date Owning Pharmacy    acetaminophen (TYLENOL) 500 MG tablet    2021        Sig: Take 1 tablet (500 mg) by mouth every 6 hours as needed for mild pain    Class: No Print Out    Route: Oral    acetone urine (KETOSTIX) test strip    2021       Si strip    Class: Historical    albuterol (PROAIR HFA/PROVENTIL HFA/VENTOLIN HFA) 108 (90 Base) MCG/ACT inhaler    10/29/2021        Sig: Inhale 1-2 puffs into the lungs     Class: Historical    Route: Inhalation    amylase-lipase-protease (CREON 6) 5878-77765-44199 units CPEP        [id number] [place] [address]    Sig: Take 3 capsules by mouth 3 times daily (with meals)    Class: Historical    Route: Oral    atorvastatin (LIPITOR) 20 MG tablet    2020    [facility] [place] [address]    Sig: Take 20 mg by mouth every morning     Class: Historical    Route: Oral    calcium carbonate (TUMS) 500 MG chewable tablet        [facility] [place] [address]    Sig: Take 1 tablet by mouth daily as needed for heartburn     Class: Historical    Route: Oral    calcium citrate-vitamin D  (CITRACAL W/D) 250-100 MG-UNIT tablet  336 tablet 3 10/21/2020    [facility] [place] [address]    Sig: Take 2 tablets by mouth 2 times daily (with meals)    Class: E-Prescribe    Route: Oral    Continuous Blood Gluc  (DEXCOM G6 ) LUNA    6/1/2021        Sig: Use as directed for continuous glucose monitoring.    Class: Historical    Continuous Blood Gluc Sensor (DEXCOM G6 SENSOR) MISC    6/1/2021        Sig: Use as directed for continuous glucose monitoring.  Change sensor every 10 days.    Class: Historical    Continuous Blood Gluc Transmit (DEXCOM G6 TRANSMITTER) MISC    6/1/2021        Sig: Use as directed for continuous glucose monitoring.  Change every 3 months.    Class: Historical    cyanocobalamin (VITAMIN B-12) 1000 MCG tablet    9/2/2020    [facility] [place] [address]    Sig: Take 1,000 mcg by mouth every 7 days     Class: Historical    Route: Oral    Ferrous Sulfate (IRON) 325 (65 FE) MG tablet            Sig: Take 1 tablet by mouth every morning     Class: Historical    Route: Oral    folic acid (FOLVITE) 1 MG tablet    2/26/2020    [facility] [place] [address]    Sig: Take 1 mg by mouth every morning     Class: Historical    Route: Oral    furosemide (LASIX) 40 MG tablet  30 tablet 3 1/14/2022    [facility] [place] [address]    Sig: Take 1 tablet (40 mg) by mouth daily    Class: E-Prescribe    Route: Oral    hydrOXYzine (ATARAX) 25 MG tablet        [facility] [place] [address]    Sig: Take 25 mg by mouth 3 times daily as needed for anxiety    Class: Historical    Route: Oral    insulin aspart (NOVOLOG FLEXPEN) 100 UNIT/ML pen    4/26/2021        Sig: Inject 1u/50>200 every 4 hours as needed for high blood glucose. Up to 20 units daily.  Indications: Diabetes Mellitus    Class: Historical    insulin glargine (LANTUS PEN) 100  UNIT/ML pen  15 mL 0 12/28/2021    [facility] [place], [place] - [address] [facility]    Sig: Inject 8 Units Subcutaneous every morning Takes around 12noon    Class: No Print Out    Notes to Pharmacy: If Lantus is not covered by insurance, may substitute Basaglar at same dose and frequency.      Route: Subcutaneous    insulin pen needle (BD GRISEL U/F) 32G X 4 MM miscellaneous    6/1/2021        Sig: Inject 1 each Subcutaneous    Class: Historical    Route: Subcutaneous    lactulose (CHRONULAC) 10 GM/15ML solution  3784 mL 5 7/9/2021    [id number] [place] [address]    Sig: TAKE 30 MLS BY MOUTH 3 TIMES DAILY    Class: E-Prescribe    levothyroxine (SYNTHROID/LEVOTHROID) 200 MCG tablet    6/4/2020 11/2/2022       Sig: Take 200 mcg by mouth    Class: Historical    Route: Oral    LORazepam (ATIVAN) 0.5 MG tablet  5 tablet 0 1/27/2022        Sig: Take 1 tablet (0.5 mg) by mouth every 6 hours as needed for anxiety    Class: Local Print    Route: Oral    metoclopramide (REGLAN) 10 MG tablet    8/20/2021        Sig: Take 10 mg by mouth    Class: Historical    Route: Oral    Multiple Vitamin (MULTIVITAMIN PO)        [facility] [place] [address]    Sig: Take 2 tablets by mouth every morning     Class: Historical    Route: Oral    multivitamin CF formula (AQUADEKS) chewable tablet  90 tablet 3 1/19/2022    [facility] [place] [address]    Sig: Take 1 tablet by mouth daily    Class: E-Prescribe    Route: Oral    omeprazole (PRILOSEC) 20 MG DR capsule  30 capsule 0 11/25/2020    [facility] [place] [address]    Sig: Take 1 capsule (20 mg) by mouth daily    Class: E-Prescribe    Route: Oral    ondansetron (ZOFRAN-ODT) 4 MG ODT tab  120 tablet 1 11/8/2021    [facility] [place] [address]    Sig: Place 1 tablet (4 mg) under the tongue every 6 hours as  needed for nausea    Class: E-Prescribe    Route: Sublingual    Renewals     Renewal requests to authorizing provider (Sis Yepez) <b>prohibited</b>          potassium chloride ER (K-TAB) 20 MEQ CR tablet  120 tablet 0 1/21/2022    [facility] [place] [address]    Sig: Take 2 tablets (40 mEq) by mouth 2 times daily    Class: E-Prescribe    Route: Oral    rifampin (RIFADIN) 300 MG capsule  90 capsule 3 8/26/2021    [facility] [place] [address]    Sig: TAKE 1 CAPSULE BY MOUTH EVERY DAY    Class: E-Prescribe    rifaximin (XIFAXAN) 550 MG TABS tablet  60 tablet 11 3/4/2021    [facility] [place] [address]    Sig: Take 1 tablet (550 mg) by mouth 2 times daily    Class: E-Prescribe    Notes to Pharmacy: Can give a 3 month supply if easier and/or cheaper for patient.    Route: Oral    Rimegepant Sulfate 75 MG TBDP    10/28/2021        Sig: Take 75 mg by mouth    Class: Historical    Route: Oral    simethicone (MYLICON) 80 MG chewable tablet        [facility] [place] [address]    Sig: Take 80 mg by mouth every 6 hours as needed for flatulence or cramping    Class: Historical    Route: Oral    sitagliptin (JANUVIA) 100 MG tablet     11/26/2020    [facility] [place] [address]    Sig: Take 100 mg by mouth every morning     Class: Historical    Route: Oral    spironolactone (ALDACTONE) 50 MG tablet  30 tablet 3 1/14/2022    [facility] [place] [address]    Sig: Take 1 tablet (50 mg) by mouth daily    Class: E-Prescribe    Route: Oral    SUMAtriptan (IMITREX) 50 MG tablet    8/28/2020    [facility] [place] [address]    Sig: Take 50 mg by mouth at onset of headache for migraine     Class: Historical    Route: Oral    topiramate (TOPAMAX) 50 MG tablet    10/27/2021        Sig: Take 50 mg by mouth daily      Class: Historical    Route: Oral    traZODone (DESYREL) 100 MG tablet    10/5/2020    [facility] [place] [address]    Sig: Take  mg by mouth nightly as needed for sleep    Class: Historical    Route: Oral    valACYclovir (VALTREX) 1000 mg tablet    3/25/2020    [facility] [place] [address]    Sig: Take 1,000 mg by mouth daily as needed (at onset of cold sore)     Class: Historical    Route: Oral    venlafaxine (EFFEXOR) 75 MG tablet  30 tablet 0 11/5/2020    [facility] [place] [address]    Sig: Take 1 tablet (75 mg) by mouth At Bedtime    Class: E-Prescribe    Notes to Pharmacy: Renewal requests go to Brooke Billy with [facility].    Route: Oral    Renewals     Renewal requests to authorizing provider (Ozzie Augustin MD) <b>prohibited</b>          vitamin A 3 MG (44412 UNITS) capsule    12/5/2019    [facility] [place] [address]    Sig: Take 10,000 Units by mouth every morning     Class: Historical    Route: Oral    vitamin C (ASCORBIC ACID) 1000 MG TABS    10/20/2021        Class: Historical    vitamin D2 (ERGOCALCIFEROL) 03048 units (1250 mcg) capsule  8 capsule 0 1/11/2022    [facility] [place] [address]    Sig: Take 1 capsule (50,000 Units) by mouth once a week    Class: E-Prescribe    Route: Oral    vitamin D3 (CHOLECALCIFEROL) 50 mcg (2000 units) tablet  90 tablet 3 10/22/2021    [facility] [place] [address]    Sig: Take 1 tablet (50 mcg) by mouth daily    Class: E-Prescribe    Route: Oral    vitamin E (TOCOPHEROL) 400 units (180 mg) capsule    9/2/2020    [facility] [place] [address]    Sig: Take 400 Units by mouth every morning     Class: Historical    Route: Oral          ALLERGIES:   Methylprednisolone, Droperidol, Nsaids, and Prednisone    ROS:  Answers for  HPI/ROS submitted by the patient on 1/10/2022  General Symptoms: Yes  Skin Symptoms: No  HENT Symptoms: No  EYE SYMPTOMS: No  HEART SYMPTOMS: No  LUNG SYMPTOMS: No  INTESTINAL SYMPTOMS: No  URINARY SYMPTOMS: No  GYNECOLOGIC SYMPTOMS: No  BREAST SYMPTOMS: No  SKELETAL SYMPTOMS: No  BLOOD SYMPTOMS: Yes  NERVOUS SYSTEM SYMPTOMS: No  MENTAL HEALTH SYMPTOMS: Yes  Fever: No  Loss of appetite: Yes  Weight loss: No  Weight gain: Yes  Fatigue: Yes  Night sweats: No  Chills: No  Increased stress: Yes  Excessive hunger: No  Excessive thirst: No  Feeling hot or cold when others believe the temperature is normal: No  Loss of height: No  Post-operative complications: No  Surgical site pain: No  Hallucinations: No  Change in or Loss of Energy: Yes  Hyperactivity: No  Confusion: Yes  Anemia: No  Swollen glands: No  Easy bleeding or bruising: Yes  Edema or swelling: Yes  Nervous or Anxious: Yes  Depression: Yes  Trouble sleeping: Yes  Trouble thinking or concentrating: No  Mood changes: No  Panic attacks: No      Physical Examination:   VITALS:   BP 97/67 (BP Location: Left arm, Patient Position: Sitting, Cuff Size: Adult Regular)   Pulse 70   SpO2 100%   General:  Pt sitting in chair comfortably.  No acute distress  HEENT: normocephalic.  Neck: no JVD  Resp: nonlabored.  CTAb  CV: RRR.  S1 & S2.  No rub  Abd: Nondistended (in my assessment) nontender, soft, normoactive bowel sounds  Lymph: no pedal edema  Neuro: Oriented x3.  No evidence of focal motor deficits  Mood: appropriate affect    Labs:    BMP RESULTS:  Lab Results   Component Value Date     01/26/2022     07/05/2021    POTASSIUM 3.1 (L) 01/26/2022    POTASSIUM 3.0 (L) 07/05/2021    CHLORIDE 108 01/26/2022    CHLORIDE 107 07/05/2021    CO2 27 01/26/2022    CO2 29 07/05/2021    ANIONGAP 5 01/26/2022    ANIONGAP 5 07/05/2021     (H) 01/26/2022     (H) 07/05/2021    BUN 3 (L) 01/26/2022    BUN 11 07/05/2021    CR 1.04 01/26/2022    CR 1.10 (H)  07/05/2021    GFRESTIMATED 66 01/26/2022    GFRESTIMATED 59 (L) 07/05/2021    GFRESTBLACK 68 07/05/2021    MAURI 7.9 (L) 01/26/2022    MAURI 7.9 (L) 07/05/2021        CBC RESULTS:  Lab Results   Component Value Date    WBC 3.6 (L) 01/26/2022    WBC 4.0 07/05/2021    RBC 3.00 (L) 01/26/2022    RBC 3.55 (L) 07/05/2021    HGB 10.0 (L) 01/26/2022    HGB 11.5 (L) 07/05/2021    HCT 30.0 (L) 01/26/2022    HCT 34.4 (L) 07/05/2021     01/26/2022    MCV 97 07/05/2021    MCH 33.3 (H) 01/26/2022    MCH 32.4 07/05/2021    MCHC 33.3 01/26/2022    MCHC 33.4 07/05/2021    RDW 17.7 (H) 01/26/2022    RDW 14.7 07/05/2021    PLT 75 (L) 01/26/2022     (L) 07/05/2021       INR/PTT:  Lab Results   Component Value Date    INR 1.85 (H) 01/26/2022    INR 1.18 (H) 07/05/2021    PTT 39 (H) 01/26/2022    PTT 32 11/22/2020       Diagnostic studies: TIPS ultrasound dated 12/27/2021 (I personally reviewed and interpreted the imaging) reveals patent TIPS with slight decreased velocities in the portal venous end of the shunt.  No ascites.    MELD-Na score: 15 at 1/26/2022 11:39 PM  MELD score: 15 at 1/26/2022 11:39 PM  Calculated from:  Serum Creatinine: 1.04 mg/dL at 1/26/2022 10:40 PM  Serum Sodium: 140 mmol/L (Using max of 137 mmol/L) at 1/26/2022 10:40 PM  Total Bilirubin: 1.6 mg/dL at 1/26/2022 10:40 PM  INR(ratio): 1.85 at 1/26/2022 11:39 PM  Age: 51 years      Assessment 51-year-old female with refractory ascites and hepatic hydrothorax, status post TIPS on 11/5/2021, with resolution of the ascites and hepatic hydrothorax and episodes of hepatic encephalopathy.  At this point encephalopathy seems adequately controlled with more compliant use of lactulose.  No other complications of the TIPS.  The shunt is widely patent in my assessment clinically and imaging wise and given the response to medication for control of encephalopathy, I would not advise for shunt reduction.  Plan  -Return to clinic in 6 months with TIPS ultrasound and  hepatic panel.    Thank you for allowing me to participate in the care of of Mrs. Puckett today..    I, Addy Kate, was present for the entirety of this visit. I have documented the findings as well as the assessment and plan.    dAdy Kate MD    I spent a total of 30 minutes face-to-face with Susan Puckett during today's office visit. Over 50% of this time was spent counseling the patient and/or coordinating care. See note for details.     An additional 15 minutes spent on the date of the encounter doing chart review, documentation and further activities as noted above    CC  Patient Care Team:  Harleen Billy PA-C as PCP - General  Rivera Dowling MD as MD (Gastroenterology)  Raphael Cook MD as MD (Gastroenterology)  Eli Tobar RN as Specialty Care Coordinator  Raphael Cook MD as Assigned Surgical Provider  Steffany Yun PA-C as Physician Assistant (Anesthesiology)  Julia Munoz RN as Nurse Coordinator (Transplant)  Sancho Gaines as Assigned Behavioral Health Provider  ADDY KATE MD

## 2022-01-11 ENCOUNTER — MYC MEDICAL ADVICE (OUTPATIENT)
Dept: TRANSPLANT | Facility: CLINIC | Age: 52
End: 2022-01-11
Payer: COMMERCIAL

## 2022-01-11 DIAGNOSIS — E55.9 VITAMIN D DEFICIENCY: Primary | ICD-10-CM

## 2022-01-11 RX ORDER — ERGOCALCIFEROL 1.25 MG/1
50000 CAPSULE, LIQUID FILLED ORAL WEEKLY
Qty: 8 CAPSULE | Refills: 0 | Status: SHIPPED | OUTPATIENT
Start: 2022-01-11 | End: 2022-03-04

## 2022-01-11 NOTE — PROGRESS NOTES
Pt had abdominal ultrasound done on 1/11 and no ascites found on imaging. Pt also had a pleural fluid check and ultrasound of the right chest demonstrates insufficient fluid for therapeutic thoracentesis.     Called pt to review ultrasound results and to check in. Pt reported increased lower extremity edema and unchanged abdominal distension. Denied shortness of breath. Pt is not currently prescribed diuretics. Encouraged pt to elevate lower extremities and adhere to low Na diet. Will check in with pt in 1 week and discuss restarting diuretics with Dr. Cook if no improvement in edema. Pt verbalized understanding and is agreeable to plan of care.

## 2022-01-11 NOTE — PATIENT INSTRUCTIONS
Susan you have had your follow up with Dr Storey IR/Vascular regarding your TIPS.  Your Ust completed on 12/27/21 has been reviewed with you during this visit.    Plan:    Return in 6 months with TIPS ultrasound and hepatic liver panel labs and visit with Dr Storey.     We will contact you closer to the date to schedule your follow up.    Please don't hesitate to call sooner with questions or concerns.    MARY ANN Philippe RN, BSN  Interventional Radiology Nurse Coordinator   Phone:  [phone]

## 2022-01-12 DIAGNOSIS — E55.9 VITAMIN D DEFICIENCY: ICD-10-CM

## 2022-01-12 LAB — PETH BLD-MCNC: NEGATIVE NG/ML

## 2022-01-12 RX ORDER — ACETAMINOPHEN 160 MG
TABLET,DISINTEGRATING ORAL
Qty: 90 CAPSULE | Refills: 3 | OUTPATIENT
Start: 2022-01-12

## 2022-01-13 ENCOUNTER — TELEPHONE (OUTPATIENT)
Dept: GASTROENTEROLOGY | Facility: CLINIC | Age: 52
End: 2022-01-13
Payer: COMMERCIAL

## 2022-01-13 ENCOUNTER — TELEPHONE (OUTPATIENT)
Dept: TRANSPLANT | Facility: CLINIC | Age: 52
End: 2022-01-13
Payer: COMMERCIAL

## 2022-01-13 DIAGNOSIS — K75.81 LIVER CIRRHOSIS SECONDARY TO NASH (H): Primary | ICD-10-CM

## 2022-01-13 DIAGNOSIS — K74.60 LIVER CIRRHOSIS SECONDARY TO NASH (H): Primary | ICD-10-CM

## 2022-01-13 DIAGNOSIS — F41.9 ANXIETY: ICD-10-CM

## 2022-01-13 DIAGNOSIS — F33.1 MAJOR DEPRESSIVE DISORDER, RECURRENT EPISODE, MODERATE (H): ICD-10-CM

## 2022-01-13 PROCEDURE — 99284 EMERGENCY DEPT VISIT MOD MDM: CPT | Mod: 25 | Performed by: EMERGENCY MEDICINE

## 2022-01-13 PROCEDURE — 99284 EMERGENCY DEPT VISIT MOD MDM: CPT | Performed by: EMERGENCY MEDICINE

## 2022-01-13 NOTE — TELEPHONE ENCOUNTER
Patient is requesting a call back from hepatology about upcoming appt and not feeling well enough to go.    She is puffy everywhere in face and legs, she is weak and has no appetite, threw up twice in past 45 mins, and  blood sugar is low.    Please follow up with patient    appetite

## 2022-01-14 ENCOUNTER — HOSPITAL ENCOUNTER (EMERGENCY)
Facility: CLINIC | Age: 52
Discharge: HOME | End: 2022-01-14
Attending: EMERGENCY MEDICINE | Admitting: EMERGENCY MEDICINE
Payer: COMMERCIAL

## 2022-01-14 VITALS
TEMPERATURE: 98.4 F | WEIGHT: 179 LBS | OXYGEN SATURATION: 100 % | SYSTOLIC BLOOD PRESSURE: 118 MMHG | BODY MASS INDEX: 29.79 KG/M2 | HEART RATE: 80 BPM | RESPIRATION RATE: 20 BRPM | DIASTOLIC BLOOD PRESSURE: 84 MMHG

## 2022-01-14 DIAGNOSIS — R60.1 GENERALIZED EDEMA: ICD-10-CM

## 2022-01-14 DIAGNOSIS — R11.0 NAUSEA: ICD-10-CM

## 2022-01-14 LAB
A-TOCOPHEROL VIT E SERPL-MCNC: 1.8 MG/L
ALBUMIN SERPL-MCNC: 2.1 G/DL (ref 3.4–5)
ALP SERPL-CCNC: 240 U/L (ref 40–150)
ALT SERPL W P-5'-P-CCNC: 29 U/L (ref 0–50)
AMMONIA PLAS-SCNC: 29 UMOL/L (ref 10–50)
ANION GAP SERPL CALCULATED.3IONS-SCNC: 6 MMOL/L (ref 3–14)
ANNOTATION COMMENT IMP: ABNORMAL
APTT PPP: 37 SECONDS (ref 22–38)
AST SERPL W P-5'-P-CCNC: 42 U/L (ref 0–45)
BASOPHILS # BLD AUTO: 0 10E3/UL (ref 0–0.2)
BASOPHILS NFR BLD AUTO: 1 %
BETA+GAMMA TOCOPHEROL SERPL-MCNC: 0.3 MG/L
BILIRUB SERPL-MCNC: 1.5 MG/DL (ref 0.2–1.3)
BUN SERPL-MCNC: 6 MG/DL (ref 7–30)
CALCIUM SERPL-MCNC: 7.8 MG/DL (ref 8.5–10.1)
CHLORIDE BLD-SCNC: 108 MMOL/L (ref 94–109)
CO2 SERPL-SCNC: 26 MMOL/L (ref 20–32)
CREAT SERPL-MCNC: 0.77 MG/DL (ref 0.52–1.04)
EOSINOPHIL # BLD AUTO: 0.1 10E3/UL (ref 0–0.7)
EOSINOPHIL NFR BLD AUTO: 3 %
ERYTHROCYTE [DISTWIDTH] IN BLOOD BY AUTOMATED COUNT: 17.5 % (ref 10–15)
GFR SERPL CREATININE-BSD FRML MDRD: >90 ML/MIN/1.73M2
GLUCOSE BLD-MCNC: 90 MG/DL (ref 70–99)
HCT VFR BLD AUTO: 34.2 % (ref 35–47)
HGB BLD-MCNC: 11 G/DL (ref 11.7–15.7)
HOLD SPECIMEN: NORMAL
IMM GRANULOCYTES # BLD: 0 10E3/UL
IMM GRANULOCYTES NFR BLD: 0 %
INR PPP: 1.48 (ref 0.85–1.15)
LIPASE SERPL-CCNC: 12 U/L (ref 73–393)
LYMPHOCYTES # BLD AUTO: 1.1 10E3/UL (ref 0.8–5.3)
LYMPHOCYTES NFR BLD AUTO: 30 %
MCH RBC QN AUTO: 32.8 PG (ref 26.5–33)
MCHC RBC AUTO-ENTMCNC: 32.2 G/DL (ref 31.5–36.5)
MCV RBC AUTO: 102 FL (ref 78–100)
MONOCYTES # BLD AUTO: 0.2 10E3/UL (ref 0–1.3)
MONOCYTES NFR BLD AUTO: 6 %
NEUTROPHILS # BLD AUTO: 2.3 10E3/UL (ref 1.6–8.3)
NEUTROPHILS NFR BLD AUTO: 60 %
NRBC # BLD AUTO: 0 10E3/UL
NRBC BLD AUTO-RTO: 0 /100
NT-PROBNP SERPL-MCNC: 240 PG/ML (ref 0–450)
PLATELET # BLD AUTO: 70 10E3/UL (ref 150–450)
POTASSIUM BLD-SCNC: 3.6 MMOL/L (ref 3.4–5.3)
PROT SERPL-MCNC: 6.2 G/DL (ref 6.8–8.8)
RBC # BLD AUTO: 3.35 10E6/UL (ref 3.8–5.2)
RETINYL PALMITATE SERPL-MCNC: <0.02 MG/L
SODIUM SERPL-SCNC: 140 MMOL/L (ref 133–144)
TROPONIN I SERPL HS-MCNC: 6 NG/L
VIT A SERPL-MCNC: <0.06 MG/L
WBC # BLD AUTO: 3.8 10E3/UL (ref 4–11)

## 2022-01-14 PROCEDURE — 85730 THROMBOPLASTIN TIME PARTIAL: CPT | Performed by: EMERGENCY MEDICINE

## 2022-01-14 PROCEDURE — 85610 PROTHROMBIN TIME: CPT | Performed by: EMERGENCY MEDICINE

## 2022-01-14 PROCEDURE — 85025 COMPLETE CBC W/AUTO DIFF WBC: CPT | Performed by: EMERGENCY MEDICINE

## 2022-01-14 PROCEDURE — 96374 THER/PROPH/DIAG INJ IV PUSH: CPT | Performed by: EMERGENCY MEDICINE

## 2022-01-14 PROCEDURE — 96375 TX/PRO/DX INJ NEW DRUG ADDON: CPT | Performed by: EMERGENCY MEDICINE

## 2022-01-14 PROCEDURE — 83880 ASSAY OF NATRIURETIC PEPTIDE: CPT | Performed by: EMERGENCY MEDICINE

## 2022-01-14 PROCEDURE — 84484 ASSAY OF TROPONIN QUANT: CPT | Performed by: EMERGENCY MEDICINE

## 2022-01-14 PROCEDURE — 82947 ASSAY GLUCOSE BLOOD QUANT: CPT | Performed by: EMERGENCY MEDICINE

## 2022-01-14 PROCEDURE — 96376 TX/PRO/DX INJ SAME DRUG ADON: CPT | Performed by: EMERGENCY MEDICINE

## 2022-01-14 PROCEDURE — 83690 ASSAY OF LIPASE: CPT | Performed by: EMERGENCY MEDICINE

## 2022-01-14 PROCEDURE — 82140 ASSAY OF AMMONIA: CPT | Performed by: EMERGENCY MEDICINE

## 2022-01-14 PROCEDURE — 36415 COLL VENOUS BLD VENIPUNCTURE: CPT | Performed by: EMERGENCY MEDICINE

## 2022-01-14 PROCEDURE — 250N000013 HC RX MED GY IP 250 OP 250 PS 637: Performed by: EMERGENCY MEDICINE

## 2022-01-14 PROCEDURE — 250N000011 HC RX IP 250 OP 636: Performed by: EMERGENCY MEDICINE

## 2022-01-14 RX ORDER — FUROSEMIDE 10 MG/ML
40 INJECTION INTRAMUSCULAR; INTRAVENOUS ONCE
Status: COMPLETED | OUTPATIENT
Start: 2022-01-14 | End: 2022-01-14

## 2022-01-14 RX ORDER — ONDANSETRON 8 MG/1
8 TABLET, ORALLY DISINTEGRATING ORAL EVERY 8 HOURS PRN
Qty: 20 TABLET | Refills: 1 | Status: SHIPPED | OUTPATIENT
Start: 2022-01-14 | End: 2022-01-17

## 2022-01-14 RX ORDER — FUROSEMIDE 40 MG
40 TABLET ORAL DAILY
Qty: 30 TABLET | Refills: 3 | Status: SHIPPED | OUTPATIENT
Start: 2022-01-14 | End: 2022-07-15

## 2022-01-14 RX ORDER — ONDANSETRON 2 MG/ML
4 INJECTION INTRAMUSCULAR; INTRAVENOUS EVERY 30 MIN PRN
Status: DISCONTINUED | OUTPATIENT
Start: 2022-01-14 | End: 2022-01-14 | Stop reason: HOSPADM

## 2022-01-14 RX ORDER — OXYCODONE HYDROCHLORIDE 5 MG/1
5 TABLET ORAL ONCE
Status: COMPLETED | OUTPATIENT
Start: 2022-01-14 | End: 2022-01-14

## 2022-01-14 RX ORDER — SPIRONOLACTONE 50 MG/1
50 TABLET, FILM COATED ORAL DAILY
Qty: 30 TABLET | Refills: 3 | Status: ON HOLD | OUTPATIENT
Start: 2022-01-14 | End: 2022-02-21

## 2022-01-14 RX ORDER — FUROSEMIDE 40 MG
40 TABLET ORAL 2 TIMES DAILY
Qty: 60 TABLET | Refills: 0 | Status: SHIPPED | OUTPATIENT
Start: 2022-01-14 | End: 2022-01-14

## 2022-01-14 RX ADMIN — ONDANSETRON 4 MG: 2 INJECTION INTRAMUSCULAR; INTRAVENOUS at 01:23

## 2022-01-14 RX ADMIN — ONDANSETRON 4 MG: 2 INJECTION INTRAMUSCULAR; INTRAVENOUS at 03:42

## 2022-01-14 RX ADMIN — OXYCODONE HYDROCHLORIDE 5 MG: 5 TABLET ORAL at 01:23

## 2022-01-14 RX ADMIN — FUROSEMIDE 40 MG: 10 INJECTION, SOLUTION INTRAVENOUS at 03:45

## 2022-01-14 ASSESSMENT — ENCOUNTER SYMPTOMS
ABDOMINAL PAIN: 1
APPETITE CHANGE: 1
COUGH: 0
HEADACHES: 1
VOMITING: 1
UNEXPECTED WEIGHT CHANGE: 1
BLOOD IN STOOL: 0
FATIGUE: 1
NAUSEA: 1
ABDOMINAL DISTENTION: 1
SHORTNESS OF BREATH: 0

## 2022-01-14 NOTE — DISCHARGE INSTRUCTIONS
Follow-up with your hepatologist.  Return to the emergency department as needed for any new or worsening symptoms.

## 2022-01-14 NOTE — TELEPHONE ENCOUNTER
Patient called via triage line complaining of weight gain that she had discussed earlier in the day with her coordinator  and new Nausea/Vomiting on top of that. She stated that she was supposed to get a phone call back from her coordinator but never heard anything. Nausea doesn't seem to be a new symptom for Susan and she is able to keep fluids down so we discussed resting and taking anti-emetics overnight and if things got significantly worse to go in to the emergency room otherwise to follow up with her coordinator in the morning.

## 2022-01-14 NOTE — TELEPHONE ENCOUNTER
Pt presented to the emergency room with nausea/vomiting, anasarca, and increased fatigue. Pt was discharged to home on lasix 40 mg 2 times daily and follow up with hepatology as needed.     Reviewed pt's ER visit with Dr. Cook and, per Dr. Cook, pt start the following diuretics: lasix 40 mg daily and spironolactone 50 mg daily.     Connected with pt to check in and review plan of care. Notified pt of change to diuretics and new prescriptions sent to pt's pharmacy. Instructed pt to get repeat BMP in one week and order faxed to PCP.    Discussed utilization of the emergency room and that nausea and edema can be managed in the outpatient setting. Reiterated the importance of pt taking antiemetics when nausea starts and notifying care team if she needs refills. Encouraged pt to follow up with behavioral health for additional support in coping with chronic illness. Emphasized that pt's ability to cope is vital in creating a successful transplant outcome.    Pt verbalized understanding and requested assistance with getting scheduled with Sancho Gaines. Will reach out to behavioral health and provide pt with scheduling instructions. Plan to check in with pt in 1 week.

## 2022-01-14 NOTE — ED TRIAGE NOTES
Reports generalized swelling starting Sunday worsening throughout week. Also with weakness, nausea, vomiting. On liver transplant list. Denies SOB, CP.

## 2022-01-14 NOTE — ED PROVIDER NOTES
ED Provider Note  [facility]      History     Chief Complaint   Patient presents with     Generalized Weakness     The history is provided by the patient and medical records.     Susan Puckett is a 51 year old female with history of CUETO cirrhosis c/b varices, hydrothorax, hepatic encephalopathy s/p TIPS 11/5/2021, DMII, s/p RnY gastric bypass (2006), DVT on Eliquis presenting to the ED for generalized weakness. Patient reports weight increase from 165-179 since her last clinic visit 1 week ago to this morning. She feels swollen all over. She states that her last thoracentesis was in November or December. She had US Tuesday (1/11) which showed insufficient fluid for thoracentesis. She reports abdominal distention with associated pain, nausea, and vomiting x3 today. She has no appetite, has not eaten today.  now, but she states it has been running low this week. She reports significant increase in nausea and fatigue today. She also endorses new and worsening headaches. No cough, shortness of breath, or black/tarry stools.     Of note patient is scheduled for EGD today (1/14), but cancelled d/t feeling ill.     Past Medical History  Past Medical History:   Diagnosis Date     Anemia      Anxiety      Cold sore      Depressive disorder      Diabetes (H)     Type 2 DM/No Insulin      Encephalopathy      Esophageal varices without bleeding (H)     grade I     History of blood transfusion     10/2019     History of seizure     diabetic seizure     Insomnia      Liver cirrhosis secondary to CUETO (H) 05/28/2020     Migraine      Pleural effusion      Thrombocytopenia (H)      Thyroid disease      Past Surgical History:   Procedure Laterality Date     APPENDECTOMY      1989     COLONOSCOPY      1/2020 at [facility]      ENT SURGERY  1998    tempanoplasty     GI SURGERY      EGD August 2020 at [facility]      GYN SURGERY  2010    laparoscopic ablation     IR TRANSVEN INTRAHEPATIC PORTOSYST  SHUNT  11/5/2021     MAMMOPLASTY REDUCTION BILATERAL  2004     ID STAB PHELBECTOMY VARICOSE VEINS, LESS THAN 10 INCISIONS, ONE EXTREMITY  2020     RNY gastric bypass  01/2006    retoocolic, retrogastric, [facility]     TONSILLECTOMY & ADENOIDECTOMY Bilateral 1978     WISDOM TEETH EXTRACTION       furosemide (LASIX) 40 MG tablet  ondansetron (ZOFRAN ODT) 8 MG ODT tab  acetaminophen (TYLENOL) 500 MG tablet  acetone urine (KETOSTIX) test strip  albuterol (PROAIR HFA/PROVENTIL HFA/VENTOLIN HFA) 108 (90 Base) MCG/ACT inhaler  amylase-lipase-protease (CREON 6) 6092-06849-76572 units CPEP  apixaban ANTICOAGULANT (ELIQUIS) 5 MG tablet  atorvastatin (LIPITOR) 20 MG tablet  calcium carbonate (TUMS) 500 MG chewable tablet  calcium citrate-vitamin D (CITRACAL W/D) 250-100 MG-UNIT tablet  Continuous Blood Gluc  (DEXCOM G6 ) LUNA  Continuous Blood Gluc Sensor (DEXCOM G6 SENSOR) MISC  Continuous Blood Gluc Transmit (DEXCOM G6 TRANSMITTER) MISC  cyanocobalamin (VITAMIN B-12) 1000 MCG tablet  Ferrous Sulfate (IRON) 325 (65 FE) MG tablet  folic acid (FOLVITE) 1 MG tablet  hydrOXYzine (ATARAX) 25 MG tablet  insulin aspart (NOVOLOG FLEXPEN) 100 UNIT/ML pen  insulin glargine (LANTUS PEN) 100 UNIT/ML pen  insulin pen needle (BD GRISEL U/F) 32G X 4 MM miscellaneous  lactulose (CHRONULAC) 10 GM/15ML solution  levothyroxine (SYNTHROID/LEVOTHROID) 200 MCG tablet  metoclopramide (REGLAN) 10 MG tablet  Multiple Vitamin (MULTIVITAMIN PO)  omeprazole (PRILOSEC) 20 MG DR capsule  ondansetron (ZOFRAN-ODT) 4 MG ODT tab  rifampin (RIFADIN) 300 MG capsule  rifaximin (XIFAXAN) 550 MG TABS tablet  Rimegepant Sulfate 75 MG TBDP  simethicone (MYLICON) 80 MG chewable tablet  sitagliptin (JANUVIA) 100 MG tablet  SUMAtriptan (IMITREX) 50 MG tablet  topiramate (TOPAMAX) 50 MG tablet  traZODone (DESYREL) 100 MG tablet  valACYclovir (VALTREX) 1000 mg tablet  venlafaxine (EFFEXOR) 75 MG tablet  vitamin A 3 MG (15767 UNITS) capsule  vitamin C  (ASCORBIC ACID) 1000 MG TABS  vitamin D2 (ERGOCALCIFEROL) 86683 units (1250 mcg) capsule  vitamin D3 (CHOLECALCIFEROL) 50 mcg (2000 units) tablet  vitamin E (TOCOPHEROL) 400 units (180 mg) capsule      Allergies   Allergen Reactions     Methylprednisolone Hives     Droperidol Anxiety     Nsaids Other (See Comments)     GI bleed.     Prednisone Anxiety, Hives and Rash     Family History  Family History   Problem Relation Age of Onset     Anesthesia Reaction Nephew         PONV     Bleeding Disorder No family hx of      Clotting Disorder No family hx of      Social History   Social History     Tobacco Use     Smoking status: Never Smoker     Smokeless tobacco: Never Used   Substance Use Topics     Alcohol use: Not Currently     Comment: Last drink was in 2017     Drug use: Never      Past medical history, past surgical history, medications, allergies, family history, and social history were reviewed with the patient. No additional pertinent items.       Review of Systems   Constitutional: Positive for appetite change (loss), fatigue and unexpected weight change (gain).   Respiratory: Negative for cough and shortness of breath.    Gastrointestinal: Positive for abdominal distention, abdominal pain, nausea and vomiting. Negative for blood in stool.   Neurological: Positive for headaches.     A complete review of systems was performed with pertinent positives and negatives noted in the HPI, and all other systems negative.    Physical Exam   BP: 99/67  Pulse: 75  Temp: 98.1  F (36.7  C)  Resp: 16  Weight: 81.2 kg (179 lb)  SpO2: 99 %  Physical Exam  Constitutional:       General: She is not in acute distress.     Appearance: She is not diaphoretic.   HENT:      Head: Normocephalic.      Mouth/Throat:      Pharynx: No oropharyngeal exudate.   Eyes:      Extraocular Movements: Extraocular movements intact.   Cardiovascular:      Rate and Rhythm: Normal rate and regular rhythm.      Heart sounds: Normal heart sounds.    Pulmonary:      Effort: No respiratory distress.      Breath sounds: Normal breath sounds.   Abdominal:      General: There is no distension.      Palpations: Abdomen is soft.      Tenderness: There is no abdominal tenderness.   Musculoskeletal:         General: No deformity.      Cervical back: Neck supple.      Right lower leg: Edema present.      Left lower leg: Edema present.   Skin:     General: Skin is warm and dry.   Neurological:      Mental Status: She is alert.      Comments: alert   Psychiatric:         Behavior: Behavior normal.         ED Course      Procedures       Results for orders placed or performed during the hospital encounter of 01/14/22   Comprehensive metabolic panel     Status: Abnormal   Result Value Ref Range    Sodium 140 133 - 144 mmol/L    Potassium 3.6 3.4 - 5.3 mmol/L    Chloride 108 94 - 109 mmol/L    Carbon Dioxide (CO2) 26 20 - 32 mmol/L    Anion Gap 6 3 - 14 mmol/L    Urea Nitrogen 6 (L) 7 - 30 mg/dL    Creatinine 0.77 0.52 - 1.04 mg/dL    Calcium 7.8 (L) 8.5 - 10.1 mg/dL    Glucose 90 70 - 99 mg/dL    Alkaline Phosphatase 240 (H) 40 - 150 U/L    AST 42 0 - 45 U/L    ALT 29 0 - 50 U/L    Protein Total 6.2 (L) 6.8 - 8.8 g/dL    Albumin 2.1 (L) 3.4 - 5.0 g/dL    Bilirubin Total 1.5 (H) 0.2 - 1.3 mg/dL    GFR Estimate >90 >60 mL/min/1.73m2   Lipase     Status: Abnormal   Result Value Ref Range    Lipase 12 (L) 73 - 393 U/L   Ammonia     Status: Normal   Result Value Ref Range    Ammonia 29 10 - 50 umol/L   Troponin I     Status: Normal   Result Value Ref Range    Troponin I High Sensitivity 6 <54 ng/L   Nt probnp inpatient (BNP)     Status: Normal   Result Value Ref Range    N terminal Pro BNP Inpatient 240 0 - 450 pg/mL   INR     Status: Abnormal   Result Value Ref Range    INR 1.48 (H) 0.85 - 1.15   Partial thromboplastin time     Status: Normal   Result Value Ref Range    aPTT 37 22 - 38 Seconds   Extra Blue Top Tube     Status: None   Result Value Ref Range    Hold Specimen JI     Extra Green Top (Lithium Heparin) Tube     Status: None   Result Value Ref Range    Hold Specimen JIC    Extra Purple Top Tube     Status: None   Result Value Ref Range    Hold Specimen JIC    CBC with platelets and differential     Status: Abnormal   Result Value Ref Range    WBC Count 3.8 (L) 4.0 - 11.0 10e3/uL    RBC Count 3.35 (L) 3.80 - 5.20 10e6/uL    Hemoglobin 11.0 (L) 11.7 - 15.7 g/dL    Hematocrit 34.2 (L) 35.0 - 47.0 %     (H) 78 - 100 fL    MCH 32.8 26.5 - 33.0 pg    MCHC 32.2 31.5 - 36.5 g/dL    RDW 17.5 (H) 10.0 - 15.0 %    Platelet Count 70 (L) 150 - 450 10e3/uL    % Neutrophils 60 %    % Lymphocytes 30 %    % Monocytes 6 %    % Eosinophils 3 %    % Basophils 1 %    % Immature Granulocytes 0 %    NRBCs per 100 WBC 0 <1 /100    Absolute Neutrophils 2.3 1.6 - 8.3 10e3/uL    Absolute Lymphocytes 1.1 0.8 - 5.3 10e3/uL    Absolute Monocytes 0.2 0.0 - 1.3 10e3/uL    Absolute Eosinophils 0.1 0.0 - 0.7 10e3/uL    Absolute Basophils 0.0 0.0 - 0.2 10e3/uL    Absolute Immature Granulocytes 0.0 <=0.4 10e3/uL    Absolute NRBCs 0.0 10e3/uL   Extra Red Top Tube     Status: None   Result Value Ref Range    Hold Specimen JIC    [place] Draw     Status: None    Narrative    The following orders were created for panel order [place] Draw.  Procedure                               Abnormality         Status                     ---------                               -----------         ------                     Extra Blue Top Tube[[id number]]                              Final result               Extra Red Top Tube[[id number]]                                                          Extra Green Top (Lithium...[[id number]]                      Final result               Extra Purple Top Tube[[id number]]                            Final result               Extra Blood Bank Purple ...[[id number]]                                                   Please view results for these tests on the individual orders.   CBC with  platelets differential     Status: Abnormal    Narrative    The following orders were created for panel order CBC with platelets differential.  Procedure                               Abnormality         Status                     ---------                               -----------         ------                     CBC with platelets and d...[[id number]]  Abnormal            Final result                 Please view results for these tests on the individual orders.   Extra Tube     Status: None    Narrative    The following orders were created for panel order Extra Tube.  Procedure                               Abnormality         Status                     ---------                               -----------         ------                     Extra Red Top Tube[[id number]]                               Final result                 Please view results for these tests on the individual orders.                   Results for orders placed or performed during the hospital encounter of 01/14/22   Comprehensive metabolic panel     Status: Abnormal   Result Value Ref Range    Sodium 140 133 - 144 mmol/L    Potassium 3.6 3.4 - 5.3 mmol/L    Chloride 108 94 - 109 mmol/L    Carbon Dioxide (CO2) 26 20 - 32 mmol/L    Anion Gap 6 3 - 14 mmol/L    Urea Nitrogen 6 (L) 7 - 30 mg/dL    Creatinine 0.77 0.52 - 1.04 mg/dL    Calcium 7.8 (L) 8.5 - 10.1 mg/dL    Glucose 90 70 - 99 mg/dL    Alkaline Phosphatase 240 (H) 40 - 150 U/L    AST 42 0 - 45 U/L    ALT 29 0 - 50 U/L    Protein Total 6.2 (L) 6.8 - 8.8 g/dL    Albumin 2.1 (L) 3.4 - 5.0 g/dL    Bilirubin Total 1.5 (H) 0.2 - 1.3 mg/dL    GFR Estimate >90 >60 mL/min/1.73m2   Lipase     Status: Abnormal   Result Value Ref Range    Lipase 12 (L) 73 - 393 U/L   Ammonia     Status: Normal   Result Value Ref Range    Ammonia 29 10 - 50 umol/L   Troponin I     Status: Normal   Result Value Ref Range    Troponin I High Sensitivity 6 <54 ng/L   Nt probnp inpatient (BNP)     Status: Normal   Result  Value Ref Range    N terminal Pro BNP Inpatient 240 0 - 450 pg/mL   INR     Status: Abnormal   Result Value Ref Range    INR 1.48 (H) 0.85 - 1.15   Partial thromboplastin time     Status: Normal   Result Value Ref Range    aPTT 37 22 - 38 Seconds   Extra Blue Top Tube     Status: None   Result Value Ref Range    Hold Specimen JIC    Extra Green Top (Lithium Heparin) Tube     Status: None   Result Value Ref Range    Hold Specimen JIC    Extra Purple Top Tube     Status: None   Result Value Ref Range    Hold Specimen JIC    CBC with platelets and differential     Status: Abnormal   Result Value Ref Range    WBC Count 3.8 (L) 4.0 - 11.0 10e3/uL    RBC Count 3.35 (L) 3.80 - 5.20 10e6/uL    Hemoglobin 11.0 (L) 11.7 - 15.7 g/dL    Hematocrit 34.2 (L) 35.0 - 47.0 %     (H) 78 - 100 fL    MCH 32.8 26.5 - 33.0 pg    MCHC 32.2 31.5 - 36.5 g/dL    RDW 17.5 (H) 10.0 - 15.0 %    Platelet Count 70 (L) 150 - 450 10e3/uL    % Neutrophils 60 %    % Lymphocytes 30 %    % Monocytes 6 %    % Eosinophils 3 %    % Basophils 1 %    % Immature Granulocytes 0 %    NRBCs per 100 WBC 0 <1 /100    Absolute Neutrophils 2.3 1.6 - 8.3 10e3/uL    Absolute Lymphocytes 1.1 0.8 - 5.3 10e3/uL    Absolute Monocytes 0.2 0.0 - 1.3 10e3/uL    Absolute Eosinophils 0.1 0.0 - 0.7 10e3/uL    Absolute Basophils 0.0 0.0 - 0.2 10e3/uL    Absolute Immature Granulocytes 0.0 <=0.4 10e3/uL    Absolute NRBCs 0.0 10e3/uL   Extra Red Top Tube     Status: None   Result Value Ref Range    Hold Specimen JIC    [place] Draw     Status: None    Narrative    The following orders were created for panel order [place] Draw.  Procedure                               Abnormality         Status                     ---------                               -----------         ------                     Extra Blue Top Tube[[id number]]                              Final result               Extra Red Top Tube[[id number]]                                                           Extra Green Top (Lithium...[[id number]]                      Final result               Extra Purple Top Tube[[id number]]                            Final result               Extra Blood Bank Purple ...[[id number]]                                                   Please view results for these tests on the individual orders.   CBC with platelets differential     Status: Abnormal    Narrative    The following orders were created for panel order CBC with platelets differential.  Procedure                               Abnormality         Status                     ---------                               -----------         ------                     CBC with platelets and d...[[id number]]  Abnormal            Final result                 Please view results for these tests on the individual orders.   Extra Tube     Status: None    Narrative    The following orders were created for panel order Extra Tube.  Procedure                               Abnormality         Status                     ---------                               -----------         ------                     Extra Red Top Tube[[id number]]                               Final result                 Please view results for these tests on the individual orders.     Medications   ondansetron (ZOFRAN) injection 4 mg (4 mg Intravenous Given 1/14/22 0123)   furosemide (LASIX) injection 40 mg (has no administration in time range)   oxyCODONE (ROXICODONE) tablet 5 mg (5 mg Oral Given 1/14/22 0123)        Assessments & Plan (with Medical Decision Making)   51-year-old female presents to us with a chief complaint of edema.  She is feeling increased fatigue and weakness.  Differential today includes but not limited to edema, kidney failure, hepatorenal syndrome.  Vital signs today were unremarkable.  LFTs were at the patient's baseline.  Hemoglobin is stable.  Creatinine is normal.  Electrolytes are otherwise unremarkable.  Patient was previously on Lasix twice  daily but this was discontinued after her TIPS procedure.  There is no signs of any infection at this point.  Discussed with GI.  They agreed with the plan to resume the Lasix.  We also will refill the patient's Zofran as she has been out of it.  She will follow-up with her hepatologist and return as needed.    I have reviewed the nursing notes. I have reviewed the findings, diagnosis, plan and need for follow up with the patient.    New Prescriptions    FUROSEMIDE (LASIX) 40 MG TABLET    Take 1 tablet (40 mg) by mouth 2 times daily    ONDANSETRON (ZOFRAN ODT) 8 MG ODT TAB    Take 1 tablet (8 mg) by mouth every 8 hours as needed for nausea       Final diagnoses:   Generalized edema   Nausea   I, Hiral Rosenberg, am serving as a trained medical scribe to document services personally performed by Wm Horner DO, based on the provider's statements to me.     I, Wm Horner DO, was physically present and have reviewed and verified the accuracy of this note documented by Hiral Rosenberg.    --  Wm Horner DO  [facility] EMERGENCY DEPARTMENT  1/13/2022     Wm Horner DO  01/14/22 0334

## 2022-01-19 ENCOUNTER — MYC MEDICAL ADVICE (OUTPATIENT)
Dept: GASTROENTEROLOGY | Facility: CLINIC | Age: 52
End: 2022-01-19
Payer: COMMERCIAL

## 2022-01-19 DIAGNOSIS — E55.9 VITAMIN D DEFICIENCY: Primary | ICD-10-CM

## 2022-01-19 DIAGNOSIS — K75.81 NASH (NONALCOHOLIC STEATOHEPATITIS): ICD-10-CM

## 2022-01-19 RX ORDER — MV-MIN 51/FOLIC ACID/VIT K/UBI 100-350MCG
1 TABLET,CHEWABLE ORAL DAILY
Qty: 90 TABLET | Refills: 3 | Status: SHIPPED | OUTPATIENT
Start: 2022-01-19 | End: 2022-02-28

## 2022-01-21 ENCOUNTER — PATIENT OUTREACH (OUTPATIENT)
Dept: GASTROENTEROLOGY | Facility: CLINIC | Age: 52
End: 2022-01-21
Payer: COMMERCIAL

## 2022-01-21 ENCOUNTER — VIRTUAL VISIT (OUTPATIENT)
Dept: BEHAVIORAL HEALTH | Facility: CLINIC | Age: 52
End: 2022-01-21
Payer: COMMERCIAL

## 2022-01-21 DIAGNOSIS — K74.60 LIVER CIRRHOSIS SECONDARY TO NASH (H): Primary | ICD-10-CM

## 2022-01-21 DIAGNOSIS — K75.81 LIVER CIRRHOSIS SECONDARY TO NASH (H): Primary | ICD-10-CM

## 2022-01-21 DIAGNOSIS — E87.6 HYPOKALEMIA: ICD-10-CM

## 2022-01-21 DIAGNOSIS — F33.1 MAJOR DEPRESSIVE DISORDER, RECURRENT EPISODE, MODERATE (H): ICD-10-CM

## 2022-01-21 PROCEDURE — 90791 PSYCH DIAGNOSTIC EVALUATION: CPT | Mod: TEL | Performed by: PSYCHOLOGIST

## 2022-01-21 RX ORDER — POTASSIUM CHLORIDE 1500 MG/1
40 TABLET, EXTENDED RELEASE ORAL 2 TIMES DAILY
Qty: 120 TABLET | Refills: 0 | Status: SHIPPED | OUTPATIENT
Start: 2022-01-21 | End: 2022-03-09

## 2022-01-21 ASSESSMENT — PATIENT HEALTH QUESTIONNAIRE - PHQ9
SUM OF ALL RESPONSES TO PHQ QUESTIONS 1-9: 16
SUM OF ALL RESPONSES TO PHQ QUESTIONS 1-9: 16
10. IF YOU CHECKED OFF ANY PROBLEMS, HOW DIFFICULT HAVE THESE PROBLEMS MADE IT FOR YOU TO DO YOUR WORK, TAKE CARE OF THINGS AT HOME, OR GET ALONG WITH OTHER PEOPLE: VERY DIFFICULT

## 2022-01-21 NOTE — LETTER
"2022       RE: Susan Puckett  [address] [facility] [address]     Dear Colleague,    Thank you for referring your patient, Susan Puckett, to the [facility] MENTAL HEALTH & ADDICTION SERVICES at [facility]. Please see a copy of my visit note below.    [facility]: Integrated Behavioral Health  Integrated Behavioral Health Services   Diagnostic Assessment Update      PATIENT'S NAME: Susan Puckett  MRN:   [id number]  :   1970  DATE OF SERVICE: 2022  SERVICE LOCATION: Telephone Visit  Visit Activities: [facility] Only     Susan Puckett is a 51 year old female who is being evaluated via a telephone visit.      The patient has been notified of the following:     \"We have found that certain health care needs can be provided without the need for a face to face visit.  This service lets us provide the care you need with a short phone conversation.      I will have full access to your [facility] medical record during this entire phone call.   I will be taking notes for your medical record.     Since this is like an office visit, we will bill your insurance company for this service.  Please check with your medical insurance if this type of telephone visit/virtual care is covered.  You may be responsible for the cost of this service if insurance coverage is denied.      There are potential benefits and risks of telephone visits (e.g. limits to patient confidentiality) that differ from in-person visits.?  Confidentiality still applies for telephone services, and nobody will record the visit.  It is important to be in a quiet, private space that is free of distractions (including cell phone or other devices) during the visit.??     If during the course of the call I believe a telephone visit is not appropriate, you will not be charged for this service\"    Consent has been obtained for this " service by care team member: yes.    Start time: 9:30    End time: 10:22      Identifying Information:  Patient is a 51 year old year old, ,  female.  Patient attended the session alone.      Answers for HPI/ROS submitted by the patient on 1/21/2022  If you checked off any problems, how difficult have these problems made it for you to do your work, take care of things at home, or get along with other people?: Very difficult  PHQ9 TOTAL SCORE: 16      Updates on Presenting Concern(s):  Patient reports the following reason(s) for continuing to receive behavioral services: relationship stress and return of depressive symptoms.  Patient reported that her symptoms and concerns are worsening.  Patient attributed the status of her current symptoms to changes in their life stressors and changes in their significant relationships      Susan presents today to [facility] services due to reported concerns about relationship stress and return of depressive symptoms. Describes struggling with medical issues over the last year, noting she was in and out of the hospital several times. 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 While she reports interest in possibly finding another line of work, she struggles with the idea of losing her job having been with her school district for a long time. Susan also notes she is struggling emotionally with various relationships in her life. She notes feeling often dissatisfied and disappointed in her friendships and with extended family members. She describes a sense of how others seem too busy to call or reach out to her, which upsets her.     Patient stated that her symptoms have resulted in the following functional impairments: chronic disease management, relationship(s), self-care and social interactions    Patient reports that other professional(s) are involved in  providing support / services.      Standard Screening tools completed, including PHQ9 and GAD7.  See Epic for today's results.  Historical PHQ9:  PHQ-9 SCORE 11/5/2020 2/10/2021 1/21/2022   PHQ-9 Total Score MyChart 10 (Moderate depression) 8 (Mild depression) 16 (Moderately severe depression)   PHQ-9 Total Score 10 8 16     Historical GAD7:  AILIN-7 SCORE 11/5/2020 2/10/2021   Total Score 10 (moderate anxiety) 3 (minimal anxiety)   Total Score 10 3       Review of Updates to Patient's Life Situation:  Patient reported experiencing relationship changes, which included loss/unavailable social support, greater conflict with support system. Patient identified some changes in the stability of their social connections and identified supports. Patient noted that their living situation did not change within the last year.     Patient's employment status did not change within the past year and remains disabled.     Patient reported no changes or new involvment with the legal system.  There are no ethnic, cultural or Worship factors that may be relevant for therapy.       Mental Health History:  Patient is not currently receiving any mental health services.  Patient is not currently taking psychotropic medication    Chemical Health History:   Patient is not currently receiving any chemical dependency treatment. Patient reports no problems as a result of their drinking / drug use.    Denies changes to substance use patterns; patient remains abstinent from alcohol and mood altering substances. No evidence of CD issues.    CAGE-AID Total Score 11/5/2020   Total Score 0   Total Score MyChart 0 (A total score of 2 or greater is considered clinically significant)       CAGE-AID score  > 1 is a positive screen, suggesting further discussion is needed to determine if evaluation for alcohol or substance abuse is appropriate.  A score > 2 is considered clinically significant, suggesting further evaluation of alcohol or  substance-related problems is indicated.    Cage-AID score is: 0   Based on Cage-Aid score and clinical interview there  are not indications of drug or alcohol abuse.      Discussed the general effects of drugs and alcohol on health and well-being.      Significant Losses / Trauma / Abuse / Neglect Issues:  There are new indications or report of significant loss, trauma, abuse or neglect issues related to: job loss possibility of losing job permenantly and major medical problems see EHR - several recent hospitalizations.    Issues of possible neglect are not present.      Medical History:   Patient Active Problem List   Diagnosis     Acute kidney injury (nontraumatic) (H)     Anxiety     Ascites     Benign tumor of colon     Brow ptosis, bilateral     Chronic diarrhea     Chronic peritoneal effusion     Colitis     Endometriosis     Hepatic encephalopathy (H)     History of gastric bypass     Insomnia     Hypothyroidism     HSV-1 (herpes simplex virus 1) infection     Iron deficiency anemia     Lactate blood increase     Raynaud phenomenon     Ptosis of eyelid     Pleural effusion associated with hepatic disorder     Hydrothorax     Pancytopenia (H)     Other headache syndrome     Liver cirrhosis secondary to CUETO (H)     Non-alcoholic cirrhosis (H)     Nausea     Moderate dehydration     Major depressive disorder, recurrent episode, moderate (H)     Infertility management     History of gastric ulcer     Allergic rhinitis     Tubular adenoma of colon     Vitamin D deficiency     Visual field defect     Type 2 diabetes mellitus without complication, without long-term current use of insulin (H)     Primary hypothyroidism     H/O gastric bypass     CUETO (nonalcoholic steatohepatitis)     Exposure to COVID-19 virus     Abdominal pain, epigastric     Hyperglycemia     Cirrhosis of liver without ascites, unspecified hepatic cirrhosis type (H)     Decompensated hepatic cirrhosis (H)     Altered mental status, unspecified  altered mental status type       Medication Review:  Current Outpatient Medications   Medication     acetaminophen (TYLENOL) 500 MG tablet     acetone urine (KETOSTIX) test strip     albuterol (PROAIR HFA/PROVENTIL HFA/VENTOLIN HFA) 108 (90 Base) MCG/ACT inhaler     amylase-lipase-protease (CREON 6) 9638-00463-46426 units CPEP     atorvastatin (LIPITOR) 20 MG tablet     calcium carbonate (TUMS) 500 MG chewable tablet     calcium citrate-vitamin D (CITRACAL W/D) 250-100 MG-UNIT tablet     Continuous Blood Gluc  (DEXCOM G6 ) LUNA     Continuous Blood Gluc Sensor (DEXCOM G6 SENSOR) MISC     Continuous Blood Gluc Transmit (DEXCOM G6 TRANSMITTER) MISC     cyanocobalamin (VITAMIN B-12) 1000 MCG tablet     Ferrous Sulfate (IRON) 325 (65 FE) MG tablet     folic acid (FOLVITE) 1 MG tablet     furosemide (LASIX) 40 MG tablet     hydrOXYzine (ATARAX) 25 MG tablet     insulin aspart (NOVOLOG FLEXPEN) 100 UNIT/ML pen     insulin glargine (LANTUS PEN) 100 UNIT/ML pen     insulin pen needle (BD GRISEL U/F) 32G X 4 MM miscellaneous     lactulose (CHRONULAC) 10 GM/15ML solution     levothyroxine (SYNTHROID/LEVOTHROID) 200 MCG tablet     metoclopramide (REGLAN) 10 MG tablet     Multiple Vitamin (MULTIVITAMIN PO)     multivitamin CF formula (AQUADEKS) chewable tablet     omeprazole (PRILOSEC) 20 MG DR capsule     ondansetron (ZOFRAN-ODT) 4 MG ODT tab     potassium chloride ER (K-TAB) 20 MEQ CR tablet     rifampin (RIFADIN) 300 MG capsule     rifaximin (XIFAXAN) 550 MG TABS tablet     Rimegepant Sulfate 75 MG TBDP     simethicone (MYLICON) 80 MG chewable tablet     sitagliptin (JANUVIA) 100 MG tablet     spironolactone (ALDACTONE) 50 MG tablet     SUMAtriptan (IMITREX) 50 MG tablet     topiramate (TOPAMAX) 50 MG tablet     traZODone (DESYREL) 100 MG tablet     valACYclovir (VALTREX) 1000 mg tablet     venlafaxine (EFFEXOR) 75 MG tablet     vitamin A 3 MG (57200 UNITS) capsule     vitamin C (ASCORBIC ACID) 1000 MG TABS      vitamin D2 (ERGOCALCIFEROL) 62822 units (1250 mcg) capsule     vitamin D3 (CHOLECALCIFEROL) 50 mcg (2000 units) tablet     vitamin E (TOCOPHEROL) 400 units (180 mg) capsule     No current facility-administered medications for this visit.       Medication Compliance:  No patient reports period of medication non-compliance. Discussed returning to medications as directed/indicated by PCP.    Patient was provided recommendation to follow-up with physician.      Mental Status Assessment:  Appearance:   Unable to assess   Eye Contact:   Unable to assess   Psychomotor Behavior: Unable to assess   Attitude:   Cooperative   Orientation:   All  Speech   Rate / Production: Normal    Volume:  Normal   Mood:    Anxious  Depressed   Affect:    Tearful Worrisome   Thought Content:  Clear   Thought Form:  Coherent  Logical   Insight:    Good       Safety Assessment:    Patient denies a history of suicidal ideation, suicide attempts, self-injurious behavior, homicidal ideation, homicidal behavior and and other safety concerns  Patient denies current or recent suicidal ideation or behaviors.  Patient denies current or recent homicidal ideation or behaviors.  Patient denies current or recent self injurious behavior or ideation.  Patient denies other safety concerns.  Patient reports there are no firearms in the house  Protective Factors Sense of responsibility to family, Religiosity, Positive social support and Positive therapeutic releationships   Risk Factors Abrupt changes in clinical condition, Current high stress and Isolation     [place] Suicide Severity Rating Scale (Lifetime/Recent)  [place] Suicide Severity Rating (Lifetime/Recent) 1/24/2022   1. Wish to be Dead (Lifetime) No   1. Wish to be Dead (Recent) No   2. Non-Specific Active Suicidal Thoughts (Lifetime) No   2. Non-Specific Active Suicidal Thoughts (Recent) No   3. Active Suicidal Ideation with any Methods (Not Plan) Without Intent to Act (Lifetime) No   3. Active  Suicidal Ideation with any Methods (Not Plan) Without Intent to Act (Recent) No   4. Active Suicidal Ideation with Some Intent to Act, Without Specific Plan (Lifetime) No   4. Active Suicidal Ideation with Some Intent to Act, Without Specific Plan (Recent) No   5. Active Suicidal Ideation with Specific Plan and Intent (Lifetime) No   5. Active Suicidal Ideation with Specific Plan and Intent (Recent) No   Most Severe Ideation Rating (Lifetime) NA   Frequency (Lifetime) NA   Duration (Lifetime) NA   Controllability (Lifetime) NA   Protective Factors  (Lifetime) NA   Reasons for Ideation (Lifetime) NA   Most Severe Ideation Rating (Past Month) NA   Frequency (Past Month) NA   Duration (Past Month) NA   Controllability (Past Month) NA   Protective Factors (Past Month) NA   Reasons for Ideation (Past Month) NA   Actual Attempt (Lifetime) No   Actual Attempt (Past 3 Months) No   Has subject engaged in non-suicidal self-injurious behavior? (Lifetime) No   Has subject engaged in non-suicidal self-injurious behavior? (Past 3 Months) No   Interrupted Attempts (Lifetime) No   Interrupted Attempts (Past 3 Months) No   Aborted or Self-Interrupted Attempt (Lifetime) No   Aborted or Self-Interrupted Attempt (Past 3 Months) No   Preparatory Acts or Behavior (Lifetime) No   Preparatory Acts or Behavior (Past 3 Months) No   Most Recent Attempt Actual Lethality Code NA   Most Lethal Attempt Actual Lethality Code NA   Initial/First Attempt Actual Lethality Code NA       [place] Suicide Severity Rating Scale (Short Version)  [place] Suicide Severity Rating (Short Version) 2/25/2021 11/5/2021 11/6/2021 12/13/2021 12/26/2021 12/26/2021 1/13/2022   Over the past 2 weeks have you felt down, depressed, or hopeless? no no no no patient unable to complete yes yes   Over the past 2 weeks have you had thoughts of killing yourself? no no no no patient unable to complete no no   Have you ever attempted to kill yourself? no no no no patient  unable to complete no no       Plan for Safety and Risk Management:  A safety and risk management plan has not been developed at this time, however patient was encouraged to call [place] [phone] should there be a change in any of these risk factors.      Patient's Strengths and Limitations:  Patient identified the following strengths or resources that will help her succeed in counseling: Evangelical / Anabaptism, commitment to health and well being, friends / good social support, family support and insight. Patient identified the following supports: family, Yarsanism / spirituality and friends. Things that may interfere with the patient's success in counseling include:confict with social support     Diagnostic Criteria:  Generalized Anxiety Disorder  A. Excessive anxiety and worry about a number of events or activities (such as work or school performance).   B. The person finds it difficult to control the worry.  C. Select 3 or more symptoms (required for diagnosis). Only one item is required in children.   - Restlessness or feeling keyed up or on edge.    - Being easily fatigued.    - Difficulty concentrating or mind going blank.    - Sleep disturbance (difficulty falling or staying asleep, or restless unsatisfying sleep).   D. The focus of the anxiety and worry is not confined to features of an Axis I disorder.  E. The anxiety, worry, or physical symptoms cause clinically significant distress or impairment in social, occupational, or other important areas of functioning.   F. The disturbance is not due to the direct physiological effects of a substance (e.g., a drug of abuse, a medication) or a general medical condition (e.g., hyperthyroidism) and does not occur exclusively during a Mood Disorder, a Psychotic Disorder, or a Pervasive Developmental Disorder.  Major Depressive Disorder  CRITERIA (A-C) REPRESENT A MAJOR DEPRESSIVE EPISODE - SELECT THESE CRITERIA  A) Recurrent episode(s) - symptoms have been present  during the same 2-week period and represent a change from previous functioning 5 or more symptoms (required for diagnosis)   - Depressed mood. Note: In children and adolescents, can be irritable mood.     - Diminished interest or pleasure in all, or almost all, activities.    - Significant weight loss when not dieting decrease in appetite.    - Decreased sleep.    - Psychomotor activity retardation.    - Fatigue or loss of energy.    - Feelings of worthlessness or inappropriate and excessive guilt.    - Diminished ability to think or concentrate, or indecisiveness.   B) The symptoms cause clinically significant distress or impairment in social, occupational, or other important areas of functioning  C) The episode is not attributable to the physiological effects of a substance or to another medical condition  D) The occurence of major depressive episode is not better explained by other thought / psychotic disorders  E) There has never been a manic episode or hypomanic episode      Functional Status:  Patient's symptoms are causing reduced functional status in the following areas: Follow through with Medical recommendations - medication compliance  Social / Relational - interactions with others including family      DSM5 Diagnoses: (Sustained by DSM5 Criteria Listed Above)  Diagnoses: 296.22 (F32.1)  Major Depressive Disorder, Single Episode, Moderate _  300.02 (F41.1) Generalized Anxiety Disorder  Psychosocial & Contextual Factors: changes to available socials support network    Preliminary Treatment Plan:    Treatment will focus on: Depressed Mood - help allieviate acute symptoms of depression  Adjustment Difficulties related to: family concerns  Relational Problems related to: Conflict or difficulties with social support system.    The Following referrals were discussed and initiated: NA    Collaboration with other professionals is not indicated at this time.    Referral to another professional/service is not  indicated at this time.    A Release of Information is not needed at this time.    Report to child or adult protection services was NA.    January 24, 2022            Again, thank you for allowing me to participate in the care of your patient.      Sincerely,    Sancho Gaines

## 2022-01-21 NOTE — LETTER
"2022      RE: Susan Puckett  [address] [facility] [id number]       [facility]: Integrated Behavioral Health  Integrated Behavioral Health Services   Diagnostic Assessment Update      PATIENT'S NAME: Susan Puckett  MRN:   [id number]  :   1970  DATE OF SERVICE: 2022  SERVICE LOCATION: Telephone Visit  Visit Activities: [facility] Only     Susan Puckett is a 51 year old female who is being evaluated via a telephone visit.      The patient has been notified of the following:     \"We have found that certain health care needs can be provided without the need for a face to face visit.  This service lets us provide the care you need with a short phone conversation.      I will have full access to your [facility] medical record during this entire phone call.   I will be taking notes for your medical record.     Since this is like an office visit, we will bill your insurance company for this service.  Please check with your medical insurance if this type of telephone visit/virtual care is covered.  You may be responsible for the cost of this service if insurance coverage is denied.      There are potential benefits and risks of telephone visits (e.g. limits to patient confidentiality) that differ from in-person visits.?  Confidentiality still applies for telephone services, and nobody will record the visit.  It is important to be in a quiet, private space that is free of distractions (including cell phone or other devices) during the visit.??     If during the course of the call I believe a telephone visit is not appropriate, you will not be charged for this service\"    Consent has been obtained for this service by care team member: yes.    Start time: 9:30    End time: 10:22      Identifying Information:  Patient is a 51 year old year old, ,  female.  Patient attended the session alone.      Answers for HPI/ROS submitted by the patient " on 1/21/2022  If you checked off any problems, how difficult have these problems made it for you to do your work, take care of things at home, or get along with other people?: Very difficult  PHQ9 TOTAL SCORE: 16      Updates on Presenting Concern(s):  Patient reports the following reason(s) for continuing to receive behavioral services: relationship stress and return of depressive symptoms.  Patient reported that her symptoms and concerns are worsening.  Patient attributed the status of her current symptoms to changes in their life stressors and changes in their significant relationships      Susan presents today to [facility] services due to reported concerns about relationship stress and return of depressive symptoms. Describes struggling with medical issues over the last year, noting she was in and out of the hospital several times. 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 While she reports interest in possibly finding another line of work, she struggles with the idea of losing her job having been with her school district for a long time. Susan also notes she is struggling emotionally with various relationships in her life. She notes feeling often dissatisfied and disappointed in her friendships and with extended family members. She describes a sense of how others seem too busy to call or reach out to her, which upsets her.     Patient stated that her symptoms have resulted in the following functional impairments: chronic disease management, relationship(s), self-care and social interactions    Patient reports that other professional(s) are involved in providing support / services.      Standard Screening tools completed, including PHQ9 and GAD7.  See Epic for today's results.  Historical PHQ9:  PHQ-9 SCORE 11/5/2020 2/10/2021 1/21/2022   PHQ-9 Total Score MyChart 10 (Moderate depression) 8 (Mild depression)  16 (Moderately severe depression)   PHQ-9 Total Score 10 8 16     Historical GAD7:  AILIN-7 SCORE 11/5/2020 2/10/2021   Total Score 10 (moderate anxiety) 3 (minimal anxiety)   Total Score 10 3       Review of Updates to Patient's Life Situation:  Patient reported experiencing relationship changes, which included loss/unavailable social support, greater conflict with support system. Patient identified some changes in the stability of their social connections and identified supports. Patient noted that their living situation did not change within the last year.     Patient's employment status did not change within the past year and remains disabled.     Patient reported no changes or new involvment with the legal system.  There are no ethnic, cultural or Taoism factors that may be relevant for therapy.       Mental Health History:  Patient is not currently receiving any mental health services.  Patient is not currently taking psychotropic medication    Chemical Health History:   Patient is not currently receiving any chemical dependency treatment. Patient reports no problems as a result of their drinking / drug use.    Denies changes to substance use patterns; patient remains abstinent from alcohol and mood altering substances. No evidence of CD issues.    CAGE-AID Total Score 11/5/2020   Total Score 0   Total Score MyChart 0 (A total score of 2 or greater is considered clinically significant)       CAGE-AID score  > 1 is a positive screen, suggesting further discussion is needed to determine if evaluation for alcohol or substance abuse is appropriate.  A score > 2 is considered clinically significant, suggesting further evaluation of alcohol or substance-related problems is indicated.    Cage-AID score is: 0   Based on Cage-Aid score and clinical interview there  are not indications of drug or alcohol abuse.      Discussed the general effects of drugs and alcohol on health and well-being.      Significant Losses /  Trauma / Abuse / Neglect Issues:  There are new indications or report of significant loss, trauma, abuse or neglect issues related to: job loss possibility of losing job permenantly and major medical problems see EHR - several recent hospitalizations.    Issues of possible neglect are not present.      Medical History:   Patient Active Problem List   Diagnosis     Acute kidney injury (nontraumatic) (H)     Anxiety     Ascites     Benign tumor of colon     Brow ptosis, bilateral     Chronic diarrhea     Chronic peritoneal effusion     Colitis     Endometriosis     Hepatic encephalopathy (H)     History of gastric bypass     Insomnia     Hypothyroidism     HSV-1 (herpes simplex virus 1) infection     Iron deficiency anemia     Lactate blood increase     Raynaud phenomenon     Ptosis of eyelid     Pleural effusion associated with hepatic disorder     Hydrothorax     Pancytopenia (H)     Other headache syndrome     Liver cirrhosis secondary to CUETO (H)     Non-alcoholic cirrhosis (H)     Nausea     Moderate dehydration     Major depressive disorder, recurrent episode, moderate (H)     Infertility management     History of gastric ulcer     Allergic rhinitis     Tubular adenoma of colon     Vitamin D deficiency     Visual field defect     Type 2 diabetes mellitus without complication, without long-term current use of insulin (H)     Primary hypothyroidism     H/O gastric bypass     CUETO (nonalcoholic steatohepatitis)     Exposure to COVID-19 virus     Abdominal pain, epigastric     Hyperglycemia     Cirrhosis of liver without ascites, unspecified hepatic cirrhosis type (H)     Decompensated hepatic cirrhosis (H)     Altered mental status, unspecified altered mental status type       Medication Review:  Current Outpatient Medications   Medication     acetaminophen (TYLENOL) 500 MG tablet     acetone urine (KETOSTIX) test strip     albuterol (PROAIR HFA/PROVENTIL HFA/VENTOLIN HFA) 108 (90 Base) MCG/ACT inhaler      amylase-lipase-protease (CREON 6) 2214-90930-36708 units CPEP     atorvastatin (LIPITOR) 20 MG tablet     calcium carbonate (TUMS) 500 MG chewable tablet     calcium citrate-vitamin D (CITRACAL W/D) 250-100 MG-UNIT tablet     Continuous Blood Gluc  (DEXCOM G6 ) LUNA     Continuous Blood Gluc Sensor (DEXCOM G6 SENSOR) MISC     Continuous Blood Gluc Transmit (DEXCOM G6 TRANSMITTER) MISC     cyanocobalamin (VITAMIN B-12) 1000 MCG tablet     Ferrous Sulfate (IRON) 325 (65 FE) MG tablet     folic acid (FOLVITE) 1 MG tablet     furosemide (LASIX) 40 MG tablet     hydrOXYzine (ATARAX) 25 MG tablet     insulin aspart (NOVOLOG FLEXPEN) 100 UNIT/ML pen     insulin glargine (LANTUS PEN) 100 UNIT/ML pen     insulin pen needle (BD GRISEL U/F) 32G X 4 MM miscellaneous     lactulose (CHRONULAC) 10 GM/15ML solution     levothyroxine (SYNTHROID/LEVOTHROID) 200 MCG tablet     metoclopramide (REGLAN) 10 MG tablet     Multiple Vitamin (MULTIVITAMIN PO)     multivitamin CF formula (AQUADEKS) chewable tablet     omeprazole (PRILOSEC) 20 MG DR capsule     ondansetron (ZOFRAN-ODT) 4 MG ODT tab     potassium chloride ER (K-TAB) 20 MEQ CR tablet     rifampin (RIFADIN) 300 MG capsule     rifaximin (XIFAXAN) 550 MG TABS tablet     Rimegepant Sulfate 75 MG TBDP     simethicone (MYLICON) 80 MG chewable tablet     sitagliptin (JANUVIA) 100 MG tablet     spironolactone (ALDACTONE) 50 MG tablet     SUMAtriptan (IMITREX) 50 MG tablet     topiramate (TOPAMAX) 50 MG tablet     traZODone (DESYREL) 100 MG tablet     valACYclovir (VALTREX) 1000 mg tablet     venlafaxine (EFFEXOR) 75 MG tablet     vitamin A 3 MG (16224 UNITS) capsule     vitamin C (ASCORBIC ACID) 1000 MG TABS     vitamin D2 (ERGOCALCIFEROL) 83809 units (1250 mcg) capsule     vitamin D3 (CHOLECALCIFEROL) 50 mcg (2000 units) tablet     vitamin E (TOCOPHEROL) 400 units (180 mg) capsule     No current facility-administered medications for this visit.       Medication  Compliance:  No patient reports period of medication non-compliance. Discussed returning to medications as directed/indicated by PCP.    Patient was provided recommendation to follow-up with physician.      Mental Status Assessment:  Appearance:   Unable to assess   Eye Contact:   Unable to assess   Psychomotor Behavior: Unable to assess   Attitude:   Cooperative   Orientation:   All  Speech   Rate / Production: Normal    Volume:  Normal   Mood:    Anxious  Depressed   Affect:    Tearful Worrisome   Thought Content:  Clear   Thought Form:  Coherent  Logical   Insight:    Good       Safety Assessment:    Patient denies a history of suicidal ideation, suicide attempts, self-injurious behavior, homicidal ideation, homicidal behavior and and other safety concerns  Patient denies current or recent suicidal ideation or behaviors.  Patient denies current or recent homicidal ideation or behaviors.  Patient denies current or recent self injurious behavior or ideation.  Patient denies other safety concerns.  Patient reports there are no firearms in the house  Protective Factors Sense of responsibility to family, Religiosity, Positive social support and Positive therapeutic releationships   Risk Factors Abrupt changes in clinical condition, Current high stress and Isolation     [place] Suicide Severity Rating Scale (Lifetime/Recent)  [place] Suicide Severity Rating (Lifetime/Recent) 1/24/2022   1. Wish to be Dead (Lifetime) No   1. Wish to be Dead (Recent) No   2. Non-Specific Active Suicidal Thoughts (Lifetime) No   2. Non-Specific Active Suicidal Thoughts (Recent) No   3. Active Suicidal Ideation with any Methods (Not Plan) Without Intent to Act (Lifetime) No   3. Active Suicidal Ideation with any Methods (Not Plan) Without Intent to Act (Recent) No   4. Active Suicidal Ideation with Some Intent to Act, Without Specific Plan (Lifetime) No   4. Active Suicidal Ideation with Some Intent to Act, Without Specific Plan (Recent)  No   5. Active Suicidal Ideation with Specific Plan and Intent (Lifetime) No   5. Active Suicidal Ideation with Specific Plan and Intent (Recent) No   Most Severe Ideation Rating (Lifetime) NA   Frequency (Lifetime) NA   Duration (Lifetime) NA   Controllability (Lifetime) NA   Protective Factors  (Lifetime) NA   Reasons for Ideation (Lifetime) NA   Most Severe Ideation Rating (Past Month) NA   Frequency (Past Month) NA   Duration (Past Month) NA   Controllability (Past Month) NA   Protective Factors (Past Month) NA   Reasons for Ideation (Past Month) NA   Actual Attempt (Lifetime) No   Actual Attempt (Past 3 Months) No   Has subject engaged in non-suicidal self-injurious behavior? (Lifetime) No   Has subject engaged in non-suicidal self-injurious behavior? (Past 3 Months) No   Interrupted Attempts (Lifetime) No   Interrupted Attempts (Past 3 Months) No   Aborted or Self-Interrupted Attempt (Lifetime) No   Aborted or Self-Interrupted Attempt (Past 3 Months) No   Preparatory Acts or Behavior (Lifetime) No   Preparatory Acts or Behavior (Past 3 Months) No   Most Recent Attempt Actual Lethality Code NA   Most Lethal Attempt Actual Lethality Code NA   Initial/First Attempt Actual Lethality Code NA       [place] Suicide Severity Rating Scale (Short Version)  [place] Suicide Severity Rating (Short Version) 2/25/2021 11/5/2021 11/6/2021 12/13/2021 12/26/2021 12/26/2021 1/13/2022   Over the past 2 weeks have you felt down, depressed, or hopeless? no no no no patient unable to complete yes yes   Over the past 2 weeks have you had thoughts of killing yourself? no no no no patient unable to complete no no   Have you ever attempted to kill yourself? no no no no patient unable to complete no no       Plan for Safety and Risk Management:  A safety and risk management plan has not been developed at this time, however patient was encouraged to call [place] [phone] should there be a change in any of these risk  factors.      Patient's Strengths and Limitations:  Patient identified the following strengths or resources that will help her succeed in counseling: Yazidi / Buddhist, commitment to health and well being, friends / good social support, family support and insight. Patient identified the following supports: family, Restoration / spirituality and friends. Things that may interfere with the patient's success in counseling include:confict with social support     Diagnostic Criteria:  Generalized Anxiety Disorder  A. Excessive anxiety and worry about a number of events or activities (such as work or school performance).   B. The person finds it difficult to control the worry.  C. Select 3 or more symptoms (required for diagnosis). Only one item is required in children.   - Restlessness or feeling keyed up or on edge.    - Being easily fatigued.    - Difficulty concentrating or mind going blank.    - Sleep disturbance (difficulty falling or staying asleep, or restless unsatisfying sleep).   D. The focus of the anxiety and worry is not confined to features of an Axis I disorder.  E. The anxiety, worry, or physical symptoms cause clinically significant distress or impairment in social, occupational, or other important areas of functioning.   F. The disturbance is not due to the direct physiological effects of a substance (e.g., a drug of abuse, a medication) or a general medical condition (e.g., hyperthyroidism) and does not occur exclusively during a Mood Disorder, a Psychotic Disorder, or a Pervasive Developmental Disorder.  Major Depressive Disorder  CRITERIA (A-C) REPRESENT A MAJOR DEPRESSIVE EPISODE - SELECT THESE CRITERIA  A) Recurrent episode(s) - symptoms have been present during the same 2-week period and represent a change from previous functioning 5 or more symptoms (required for diagnosis)   - Depressed mood. Note: In children and adolescents, can be irritable mood.     - Diminished interest or pleasure in all, or  almost all, activities.    - Significant weight loss when not dieting decrease in appetite.    - Decreased sleep.    - Psychomotor activity retardation.    - Fatigue or loss of energy.    - Feelings of worthlessness or inappropriate and excessive guilt.    - Diminished ability to think or concentrate, or indecisiveness.   B) The symptoms cause clinically significant distress or impairment in social, occupational, or other important areas of functioning  C) The episode is not attributable to the physiological effects of a substance or to another medical condition  D) The occurence of major depressive episode is not better explained by other thought / psychotic disorders  E) There has never been a manic episode or hypomanic episode      Functional Status:  Patient's symptoms are causing reduced functional status in the following areas: Follow through with Medical recommendations - medication compliance  Social / Relational - interactions with others including family      DSM5 Diagnoses: (Sustained by DSM5 Criteria Listed Above)  Diagnoses: 296.22 (F32.1)  Major Depressive Disorder, Single Episode, Moderate _  300.02 (F41.1) Generalized Anxiety Disorder  Psychosocial & Contextual Factors: changes to available socials support network    Preliminary Treatment Plan:    Treatment will focus on: Depressed Mood - help allieviate acute symptoms of depression  Adjustment Difficulties related to: family concerns  Relational Problems related to: Conflict or difficulties with social support system.    The Following referrals were discussed and initiated: NA    Collaboration with other professionals is not indicated at this time.    Referral to another professional/service is not indicated at this time.    A Release of Information is not needed at this time.    Report to child or adult protection services was NA.    January 24, 2022          Sancho Gaines

## 2022-01-21 NOTE — PROGRESS NOTES
"[facility]: Integrated Behavioral Health  Integrated Behavioral Health Services   Diagnostic Assessment Update      PATIENT'S NAME: Susan Puckett  MRN:   [id number]  :   1970  DATE OF SERVICE: 2022  SERVICE LOCATION: Telephone Visit  Visit Activities: [facility] Emergency Department Only     Susan Puckett is a 51 year old female who is being evaluated via a telephone visit.      The patient has been notified of the following:     \"We have found that certain health care needs can be provided without the need for a face to face visit.  This service lets us provide the care you need with a short phone conversation.      I will have full access to your [facility] medical record during this entire phone call.   I will be taking notes for your medical record.     Since this is like an office visit, we will bill your insurance company for this service.  Please check with your medical insurance if this type of telephone visit/virtual care is covered.  You may be responsible for the cost of this service if insurance coverage is denied.      There are potential benefits and risks of telephone visits (e.g. limits to patient confidentiality) that differ from in-person visits.?  Confidentiality still applies for telephone services, and nobody will record the visit.  It is important to be in a quiet, private space that is free of distractions (including cell phone or other devices) during the visit.??     If during the course of the call I believe a telephone visit is not appropriate, you will not be charged for this service\"    Consent has been obtained for this service by care team member: yes.    Start time: 9:30    End time: 10:22      Identifying Information:  Patient is a 51 year old year old, ,  female.  Patient attended the session alone.      Answers for HPI/ROS submitted by the patient on 2022  If you checked off any problems, how difficult have these problems " made it for you to do your work, take care of things at home, or get along with other people?: Very difficult  PHQ9 TOTAL SCORE: 16      Updates on Presenting Concern(s):  Patient reports the following reason(s) for continuing to receive behavioral services: relationship stress and return of depressive symptoms.  Patient reported that her symptoms and concerns are worsening.  Patient attributed the status of her current symptoms to changes in their life stressors and changes in their significant relationships      Susan presents today to [facility] services due to reported concerns about relationship stress and return of depressive symptoms. Describes struggling with medical issues over the last year, noting she was in and out of the hospital several times. 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 While she reports interest in possibly finding another line of work, she struggles with the idea of losing her job having been with her school district for a long time. Susan also notes she is struggling emotionally with various relationships in her life. She notes feeling often dissatisfied and disappointed in her friendships and with extended family members. She describes a sense of how others seem too busy to call or reach out to her, which upsets her.     Patient stated that her symptoms have resulted in the following functional impairments: chronic disease management, relationship(s), self-care and social interactions    Patient reports that other professional(s) are involved in providing support / services.      Standard Screening tools completed, including PHQ9 and GAD7.  See Epic for today's results.  Historical PHQ9:  PHQ-9 SCORE 11/5/2020 2/10/2021 1/21/2022   PHQ-9 Total Score MyChart 10 (Moderate depression) 8 (Mild depression) 16 (Moderately severe depression)   PHQ-9 Total Score 10 8 16     Historical  GAD7:  AILIN-7 SCORE 11/5/2020 2/10/2021   Total Score 10 (moderate anxiety) 3 (minimal anxiety)   Total Score 10 3       Review of Updates to Patient's Life Situation:  Patient reported experiencing relationship changes, which included loss/unavailable social support, greater conflict with support system. Patient identified some changes in the stability of their social connections and identified supports. Patient noted that their living situation did not change within the last year.     Patient's employment status did not change within the past year and remains disabled.     Patient reported no changes or new involvment with the legal system.  There are no ethnic, cultural or Zoroastrianism factors that may be relevant for therapy.       Mental Health History:  Patient is not currently receiving any mental health services.  Patient is not currently taking psychotropic medication    Chemical Health History:   Patient is not currently receiving any chemical dependency treatment. Patient reports no problems as a result of their drinking / drug use.    Denies changes to substance use patterns; patient remains abstinent from alcohol and mood altering substances. No evidence of CD issues.    CAGE-AID Total Score 11/5/2020   Total Score 0   Total Score MyChart 0 (A total score of 2 or greater is considered clinically significant)       CAGE-AID score  > 1 is a positive screen, suggesting further discussion is needed to determine if evaluation for alcohol or substance abuse is appropriate.  A score > 2 is considered clinically significant, suggesting further evaluation of alcohol or substance-related problems is indicated.    Cage-AID score is: 0   Based on Cage-Aid score and clinical interview there  are not indications of drug or alcohol abuse.      Discussed the general effects of drugs and alcohol on health and well-being.      Significant Losses / Trauma / Abuse / Neglect Issues:  There are new indications or report of  significant loss, trauma, abuse or neglect issues related to: job loss possibility of losing job permenantly and major medical problems see EHR - several recent hospitalizations.    Issues of possible neglect are not present.      Medical History:   Patient Active Problem List   Diagnosis     Acute kidney injury (nontraumatic) (H)     Anxiety     Ascites     Benign tumor of colon     Brow ptosis, bilateral     Chronic diarrhea     Chronic peritoneal effusion     Colitis     Endometriosis     Hepatic encephalopathy (H)     History of gastric bypass     Insomnia     Hypothyroidism     HSV-1 (herpes simplex virus 1) infection     Iron deficiency anemia     Lactate blood increase     Raynaud phenomenon     Ptosis of eyelid     Pleural effusion associated with hepatic disorder     Hydrothorax     Pancytopenia (H)     Other headache syndrome     Liver cirrhosis secondary to CUETO (H)     Non-alcoholic cirrhosis (H)     Nausea     Moderate dehydration     Major depressive disorder, recurrent episode, moderate (H)     Infertility management     History of gastric ulcer     Allergic rhinitis     Tubular adenoma of colon     Vitamin D deficiency     Visual field defect     Type 2 diabetes mellitus without complication, without long-term current use of insulin (H)     Primary hypothyroidism     H/O gastric bypass     CUETO (nonalcoholic steatohepatitis)     Exposure to COVID-19 virus     Abdominal pain, epigastric     Hyperglycemia     Cirrhosis of liver without ascites, unspecified hepatic cirrhosis type (H)     Decompensated hepatic cirrhosis (H)     Altered mental status, unspecified altered mental status type       Medication Review:  Current Outpatient Medications   Medication     acetaminophen (TYLENOL) 500 MG tablet     acetone urine (KETOSTIX) test strip     albuterol (PROAIR HFA/PROVENTIL HFA/VENTOLIN HFA) 108 (90 Base) MCG/ACT inhaler     amylase-lipase-protease (CREON 6) 3718-51992-58419 units CPEP     atorvastatin  (LIPITOR) 20 MG tablet     calcium carbonate (TUMS) 500 MG chewable tablet     calcium citrate-vitamin D (CITRACAL W/D) 250-100 MG-UNIT tablet     Continuous Blood Gluc  (DEXCOM G6 ) LUNA     Continuous Blood Gluc Sensor (DEXCOM G6 SENSOR) MISC     Continuous Blood Gluc Transmit (DEXCOM G6 TRANSMITTER) MISC     cyanocobalamin (VITAMIN B-12) 1000 MCG tablet     Ferrous Sulfate (IRON) 325 (65 FE) MG tablet     folic acid (FOLVITE) 1 MG tablet     furosemide (LASIX) 40 MG tablet     hydrOXYzine (ATARAX) 25 MG tablet     insulin aspart (NOVOLOG FLEXPEN) 100 UNIT/ML pen     insulin glargine (LANTUS PEN) 100 UNIT/ML pen     insulin pen needle (BD GRISEL U/F) 32G X 4 MM miscellaneous     lactulose (CHRONULAC) 10 GM/15ML solution     levothyroxine (SYNTHROID/LEVOTHROID) 200 MCG tablet     metoclopramide (REGLAN) 10 MG tablet     Multiple Vitamin (MULTIVITAMIN PO)     multivitamin CF formula (AQUADEKS) chewable tablet     omeprazole (PRILOSEC) 20 MG DR capsule     ondansetron (ZOFRAN-ODT) 4 MG ODT tab     potassium chloride ER (K-TAB) 20 MEQ CR tablet     rifampin (RIFADIN) 300 MG capsule     rifaximin (XIFAXAN) 550 MG TABS tablet     Rimegepant Sulfate 75 MG TBDP     simethicone (MYLICON) 80 MG chewable tablet     sitagliptin (JANUVIA) 100 MG tablet     spironolactone (ALDACTONE) 50 MG tablet     SUMAtriptan (IMITREX) 50 MG tablet     topiramate (TOPAMAX) 50 MG tablet     traZODone (DESYREL) 100 MG tablet     valACYclovir (VALTREX) 1000 mg tablet     venlafaxine (EFFEXOR) 75 MG tablet     vitamin A 3 MG (19212 UNITS) capsule     vitamin C (ASCORBIC ACID) 1000 MG TABS     vitamin D2 (ERGOCALCIFEROL) 91428 units (1250 mcg) capsule     vitamin D3 (CHOLECALCIFEROL) 50 mcg (2000 units) tablet     vitamin E (TOCOPHEROL) 400 units (180 mg) capsule     No current facility-administered medications for this visit.       Medication Compliance:  No patient reports period of medication non-compliance. Discussed returning  to medications as directed/indicated by PCP.    Patient was provided recommendation to follow-up with physician.      Mental Status Assessment:  Appearance:   Unable to assess   Eye Contact:   Unable to assess   Psychomotor Behavior: Unable to assess   Attitude:   Cooperative   Orientation:   All  Speech   Rate / Production: Normal    Volume:  Normal   Mood:    Anxious  Depressed   Affect:    Tearful Worrisome   Thought Content:  Clear   Thought Form:  Coherent  Logical   Insight:    Good       Safety Assessment:    Patient denies a history of suicidal ideation, suicide attempts, self-injurious behavior, homicidal ideation, homicidal behavior and and other safety concerns  Patient denies current or recent suicidal ideation or behaviors.  Patient denies current or recent homicidal ideation or behaviors.  Patient denies current or recent self injurious behavior or ideation.  Patient denies other safety concerns.  Patient reports there are no firearms in the house  Protective Factors Sense of responsibility to family, Religiosity, Positive social support and Positive therapeutic releationships   Risk Factors Abrupt changes in clinical condition, Current high stress and Isolation     [place] Suicide Severity Rating Scale (Lifetime/Recent)  [place] Suicide Severity Rating (Lifetime/Recent) 1/24/2022   1. Wish to be Dead (Lifetime) No   1. Wish to be Dead (Recent) No   2. Non-Specific Active Suicidal Thoughts (Lifetime) No   2. Non-Specific Active Suicidal Thoughts (Recent) No   3. Active Suicidal Ideation with any Methods (Not Plan) Without Intent to Act (Lifetime) No   3. Active Suicidal Ideation with any Methods (Not Plan) Without Intent to Act (Recent) No   4. Active Suicidal Ideation with Some Intent to Act, Without Specific Plan (Lifetime) No   4. Active Suicidal Ideation with Some Intent to Act, Without Specific Plan (Recent) No   5. Active Suicidal Ideation with Specific Plan and Intent (Lifetime) No   5. Active  Suicidal Ideation with Specific Plan and Intent (Recent) No   Most Severe Ideation Rating (Lifetime) NA   Frequency (Lifetime) NA   Duration (Lifetime) NA   Controllability (Lifetime) NA   Protective Factors  (Lifetime) NA   Reasons for Ideation (Lifetime) NA   Most Severe Ideation Rating (Past Month) NA   Frequency (Past Month) NA   Duration (Past Month) NA   Controllability (Past Month) NA   Protective Factors (Past Month) NA   Reasons for Ideation (Past Month) NA   Actual Attempt (Lifetime) No   Actual Attempt (Past 3 Months) No   Has subject engaged in non-suicidal self-injurious behavior? (Lifetime) No   Has subject engaged in non-suicidal self-injurious behavior? (Past 3 Months) No   Interrupted Attempts (Lifetime) No   Interrupted Attempts (Past 3 Months) No   Aborted or Self-Interrupted Attempt (Lifetime) No   Aborted or Self-Interrupted Attempt (Past 3 Months) No   Preparatory Acts or Behavior (Lifetime) No   Preparatory Acts or Behavior (Past 3 Months) No   Most Recent Attempt Actual Lethality Code NA   Most Lethal Attempt Actual Lethality Code NA   Initial/First Attempt Actual Lethality Code NA       [place] Suicide Severity Rating Scale (Short Version)  [place] Suicide Severity Rating (Short Version) 2/25/2021 11/5/2021 11/6/2021 12/13/2021 12/26/2021 12/26/2021 1/13/2022   Over the past 2 weeks have you felt down, depressed, or hopeless? no no no no patient unable to complete yes yes   Over the past 2 weeks have you had thoughts of killing yourself? no no no no patient unable to complete no no   Have you ever attempted to kill yourself? no no no no patient unable to complete no no       Plan for Safety and Risk Management:  A safety and risk management plan has not been developed at this time, however patient was encouraged to call [place] [phone] should there be a change in any of these risk factors.      Patient's Strengths and Limitations:  Patient identified the following strengths or  resources that will help her succeed in counseling: [employer] / Yarsanism, commitment to health and well being, friends / good social support, family support and insight. Patient identified the following supports: family, Denominational / spirituality and friends. Things that may interfere with the patient's success in counseling include:confict with social support     Diagnostic Criteria:  Generalized Anxiety Disorder  A. Excessive anxiety and worry about a number of events or activities (such as work or school performance).   B. The person finds it difficult to control the worry.  C. Select 3 or more symptoms (required for diagnosis). Only one item is required in children.   - Restlessness or feeling keyed up or on edge.    - Being easily fatigued.    - Difficulty concentrating or mind going blank.    - Sleep disturbance (difficulty falling or staying asleep, or restless unsatisfying sleep).   D. The focus of the anxiety and worry is not confined to features of an Axis I disorder.  E. The anxiety, worry, or physical symptoms cause clinically significant distress or impairment in social, occupational, or other important areas of functioning.   F. The disturbance is not due to the direct physiological effects of a substance (e.g., a drug of abuse, a medication) or a general medical condition (e.g., hyperthyroidism) and does not occur exclusively during a Mood Disorder, a Psychotic Disorder, or a Pervasive Developmental Disorder.  Major Depressive Disorder  CRITERIA (A-C) REPRESENT A MAJOR DEPRESSIVE EPISODE - SELECT THESE CRITERIA  A) Recurrent episode(s) - symptoms have been present during the same 2-week period and represent a change from previous functioning 5 or more symptoms (required for diagnosis)   - Depressed mood. Note: In children and adolescents, can be irritable mood.     - Diminished interest or pleasure in all, or almost all, activities.    - Significant weight loss when not dieting decrease in appetite.    -  Decreased sleep.    - Psychomotor activity retardation.    - Fatigue or loss of energy.    - Feelings of worthlessness or inappropriate and excessive guilt.    - Diminished ability to think or concentrate, or indecisiveness.   B) The symptoms cause clinically significant distress or impairment in social, occupational, or other important areas of functioning  C) The episode is not attributable to the physiological effects of a substance or to another medical condition  D) The occurence of major depressive episode is not better explained by other thought / psychotic disorders  E) There has never been a manic episode or hypomanic episode      Functional Status:  Patient's symptoms are causing reduced functional status in the following areas: Follow through with Medical recommendations - medication compliance  Social / Relational - interactions with others including family      DSM5 Diagnoses: (Sustained by DSM5 Criteria Listed Above)  Diagnoses: 296.22 (F32.1)  Major Depressive Disorder, Single Episode, Moderate _  300.02 (F41.1) Generalized Anxiety Disorder  Psychosocial & Contextual Factors: changes to available socials support network    Preliminary Treatment Plan:    Treatment will focus on: Depressed Mood - help allieviate acute symptoms of depression  Adjustment Difficulties related to: family concerns  Relational Problems related to: Conflict or difficulties with social support system.    The Following referrals were discussed and initiated: NA    Collaboration with other professionals is not indicated at this time.    Referral to another professional/service is not indicated at this time.    A Release of Information is not needed at this time.    Report to child or adult protection services was NA.    Sancho Gaines Psy.D, ALEXIS   Behavioral Health Clinician   [facility]     January 24, 2022

## 2022-01-21 NOTE — PROGRESS NOTES
Pt had labs drawn after restarting diuretics. Dr. Cook reviewed pt's labs and, per Dr. Cook, pt needs to start potassium chloride 40 mEq 2 times daily. No changes will be made to diuretics at this time and pt will need repeat BMP in 1 week.    Called pt to review plan of care. Pt will  potassium prescription and start medication today. Pt notified writer that PCP is making adjustments to Synthroid dose after recent labs showed low T4 level. Will update pt's chart with medication changes.     Plan to check in with pt in 1 week. Pt verbalized understanding and is agreeable to plan of care.

## 2022-01-22 ASSESSMENT — PATIENT HEALTH QUESTIONNAIRE - PHQ9: SUM OF ALL RESPONSES TO PHQ QUESTIONS 1-9: 16

## 2022-01-24 ENCOUNTER — OFFICE VISIT (OUTPATIENT)
Dept: TRANSPLANT | Facility: CLINIC | Age: 52
End: 2022-01-24
Attending: INTERNAL MEDICINE
Payer: COMMERCIAL

## 2022-01-24 VITALS
WEIGHT: 158.6 LBS | HEART RATE: 81 BPM | BODY MASS INDEX: 26.39 KG/M2 | DIASTOLIC BLOOD PRESSURE: 75 MMHG | OXYGEN SATURATION: 100 % | SYSTOLIC BLOOD PRESSURE: 114 MMHG

## 2022-01-24 DIAGNOSIS — K75.81 NASH (NONALCOHOLIC STEATOHEPATITIS): ICD-10-CM

## 2022-01-24 PROCEDURE — 99214 OFFICE O/P EST MOD 30 MIN: CPT | Performed by: TRANSPLANT SURGERY

## 2022-01-24 ASSESSMENT — COLUMBIA-SUICIDE SEVERITY RATING SCALE - C-SSRS
1. IN THE PAST MONTH, HAVE YOU WISHED YOU WERE DEAD OR WISHED YOU COULD GO TO SLEEP AND NOT WAKE UP?: NO
2. HAVE YOU ACTUALLY HAD ANY THOUGHTS OF KILLING YOURSELF LIFETIME?: NO
4. HAVE YOU HAD THESE THOUGHTS AND HAD SOME INTENTION OF ACTING ON THEM?: NO
5. HAVE YOU STARTED TO WORK OUT OR WORKED OUT THE DETAILS OF HOW TO KILL YOURSELF? DO YOU INTEND TO CARRY OUT THIS PLAN?: NO
6. HAVE YOU EVER DONE ANYTHING, STARTED TO DO ANYTHING, OR PREPARED TO DO ANYTHING TO END YOUR LIFE?: NO
TOTAL  NUMBER OF ABORTED OR SELF INTERRUPTED ATTEMPTS PAST 3 MONTHS: NO
5. HAVE YOU STARTED TO WORK OUT OR WORKED OUT THE DETAILS OF HOW TO KILL YOURSELF? DO YOU INTEND TO CARRY OUT THIS PLAN?: NO
3. HAVE YOU BEEN THINKING ABOUT HOW YOU MIGHT KILL YOURSELF?: NO
ATTEMPT LIFETIME: NO
1. IN THE PAST MONTH, HAVE YOU WISHED YOU WERE DEAD OR WISHED YOU COULD GO TO SLEEP AND NOT WAKE UP?: NO
ATTEMPT PAST THREE MONTHS: NO
6. HAVE YOU EVER DONE ANYTHING, STARTED TO DO ANYTHING, OR PREPARED TO DO ANYTHING TO END YOUR LIFE?: NO
TOTAL  NUMBER OF ABORTED OR SELF INTERRUPTED ATTEMPTS PAST LIFETIME: NO
TOTAL  NUMBER OF INTERRUPTED ATTEMPTS LIFETIME: NO
2. HAVE YOU ACTUALLY HAD ANY THOUGHTS OF KILLING YOURSELF?: NO
TOTAL  NUMBER OF INTERRUPTED ATTEMPTS PAST 3 MONTHS: NO
4. HAVE YOU HAD THESE THOUGHTS AND HAD SOME INTENTION OF ACTING ON THEM?: NO

## 2022-01-24 NOTE — PROGRESS NOTES
"HPI      ROS      Physical Exam    \"Much or all of the text in this note was generated through the use of Dragon Dictate voice to text software. Errors in spelling or words which appear to be out of context are unintentional, may be present due having escaped editing\"    Please see my previous notes.    Patient has end-stage liver disease secondary to Du.  She has also had a previous laparoscopic Faustino-en-Y gastric bypass.    Overall since I last saw her, she continues to have easy fatigability significant amount of anxiety and and claims has poor quality of life.    Since the placement of the TIPS, her ascites is improved.      /75   Pulse 81   Wt 71.9 kg (158 lb 9.6 oz)   SpO2 100%   Breastfeeding No   BMI 26.39 kg/m      Examination of the abdomen: The abdomen is soft.    Recommendations:    Suggest to pursue living donation  Would also recommend checking her vitamin deficiencies specifically in regards to the Faustino-en-Y gastric bypass.    I took an opportunity to explain the risk benefits alternatives of liver transplantation.    I had a long discussion with the patient regarding liver transplantation which included but was not limited to  the following points:    1. Liver transplant selection committee process.  2. The federal rules for cadaveric waiting list, the size and blood type matching of the organ. The availability of living-related donor transplantation.  3. The types of donors: brain death donors, non-heart beating donors, partial liver grafts: splits and living donor grafts  4. Extended criteria  Donors (older age, steasosis) and the increased  risk of primary non-function using the extended criteria donors  5. The CDC high risk donors,  Risk of donor transmitted infections and donor transmitted malignancy  6. The liver transplant operation and the associated risks and technical complications which can include intraoperative death, post operative death,  Primary non-function, bleeding " requiring re-operations, arterial and biliary complications, bowel perforations, and intra abdominal abscess. Some of these complicaitons may require a second operation.  7. The postoperative course, the ICU stay and risk of postoperative complications which can include sepsis, MI, stroke, brain injury, pneumonia, pleural effusions, and renal dysfunction.  8. The current 1 year and 5 year graft and patient survivals.  9. The need for life long immunosuppressive therapy and the side effects of these medications, including the possibility of toxicity, opportunistic infections, risk of cancer including lymphoma, and the possibility of rejection even if the patient is taking the medication exactly as prescribed.  10. The need for compliance with medications and follow-up visits in the clinic and thereafter.  11. The patient and family understand these risks and wish to proceed to transplantation    Time spent direct face to face counselling, reviewing the records and charting.: 30 min

## 2022-01-24 NOTE — LETTER
"  1/24/2022     RE: Susan Puckett  [address] [facility] [id number]    Dear Colleague,    Thank you for referring your patient, Susan Puckett, to the [facility]. Please see a copy of my visit note below.    HPI      ROS      Physical Exam    \"Much or all of the text in this note was generated through the use of Dragon Dictate voice to text software. Errors in spelling or words which appear to be out of context are unintentional, may be present due having escaped editing\"    Please see my previous notes.    Patient has end-stage liver disease secondary to Du.  She has also had a previous laparoscopic Faustino-en-Y gastric bypass.    Overall since I last saw her, she continues to have easy fatigability significant amount of anxiety and and claims has poor quality of life.    Since the placement of the TIPS, her ascites is improved.      /75   Pulse 81   Wt 71.9 kg (158 lb 9.6 oz)   SpO2 100%   Breastfeeding No   BMI 26.39 kg/m      Examination of the abdomen: The abdomen is soft.    Recommendations:    Suggest to pursue living donation  Would also recommend checking her vitamin deficiencies specifically in regards to the Faustino-en-Y gastric bypass.    I took an opportunity to explain the risk benefits alternatives of liver transplantation.    I had a long discussion with the patient regarding liver transplantation which included but was not limited to  the following points:    1. Liver transplant selection committee process.  2. The federal rules for cadaveric waiting list, the size and blood type matching of the organ. The availability of living-related donor transplantation.  3. The types of donors: brain death donors, non-heart beating donors, partial liver grafts: splits and living donor grafts  4. Extended criteria  Donors (older age, steasosis) and the increased  risk of primary non-function using the extended criteria donors  5. The CDC high risk donors,  Risk of donor " transmitted infections and donor transmitted malignancy  6. The liver transplant operation and the associated risks and technical complications which can include intraoperative death, post operative death,  Primary non-function, bleeding requiring re-operations, arterial and biliary complications, bowel perforations, and intra abdominal abscess. Some of these complicaitons may require a second operation.  7. The postoperative course, the ICU stay and risk of postoperative complications which can include sepsis, MI, stroke, brain injury, pneumonia, pleural effusions, and renal dysfunction.  8. The current 1 year and 5 year graft and patient survivals.  9. The need for life long immunosuppressive therapy and the side effects of these medications, including the possibility of toxicity, opportunistic infections, risk of cancer including lymphoma, and the possibility of rejection even if the patient is taking the medication exactly as prescribed.  10. The need for compliance with medications and follow-up visits in the clinic and thereafter.  11. The patient and family understand these risks and wish to proceed to transplantation    Time spent direct face to face counselling, reviewing the records and charting.: 30 min    Again, thank you for allowing me to participate in the care of your patient.      Sincerely,    Alexei Alcaraz MD

## 2022-01-26 ENCOUNTER — HOSPITAL ENCOUNTER (EMERGENCY)
Facility: CLINIC | Age: 52
Discharge: HOME | End: 2022-01-27
Attending: EMERGENCY MEDICINE | Admitting: EMERGENCY MEDICINE
Payer: COMMERCIAL

## 2022-01-26 DIAGNOSIS — R10.84 ABDOMINAL PAIN, GENERALIZED: ICD-10-CM

## 2022-01-26 LAB
ABO/RH(D): NORMAL
ALBUMIN SERPL-MCNC: 2.4 G/DL (ref 3.4–5)
ALP SERPL-CCNC: 216 U/L (ref 40–150)
ALT SERPL W P-5'-P-CCNC: 26 U/L (ref 0–50)
AMMONIA PLAS-SCNC: 25 UMOL/L (ref 10–50)
ANION GAP SERPL CALCULATED.3IONS-SCNC: 5 MMOL/L (ref 3–14)
ANTIBODY SCREEN: NEGATIVE
AST SERPL W P-5'-P-CCNC: 35 U/L (ref 0–45)
BASOPHILS # BLD AUTO: 0 10E3/UL (ref 0–0.2)
BASOPHILS NFR BLD AUTO: 1 %
BILIRUB SERPL-MCNC: 1.6 MG/DL (ref 0.2–1.3)
BUN SERPL-MCNC: 3 MG/DL (ref 7–30)
CALCIUM SERPL-MCNC: 7.9 MG/DL (ref 8.5–10.1)
CHLORIDE BLD-SCNC: 108 MMOL/L (ref 94–109)
CO2 SERPL-SCNC: 27 MMOL/L (ref 20–32)
CREAT SERPL-MCNC: 1.04 MG/DL (ref 0.52–1.04)
EOSINOPHIL # BLD AUTO: 0.1 10E3/UL (ref 0–0.7)
EOSINOPHIL NFR BLD AUTO: 4 %
ERYTHROCYTE [DISTWIDTH] IN BLOOD BY AUTOMATED COUNT: 17.7 % (ref 10–15)
GFR SERPL CREATININE-BSD FRML MDRD: 66 ML/MIN/1.73M2
GLUCOSE BLD-MCNC: 125 MG/DL (ref 70–99)
HCT VFR BLD AUTO: 30 % (ref 35–47)
HGB BLD-MCNC: 10 G/DL (ref 11.7–15.7)
IMM GRANULOCYTES # BLD: 0 10E3/UL
IMM GRANULOCYTES NFR BLD: 0 %
LYMPHOCYTES # BLD AUTO: 1.4 10E3/UL (ref 0.8–5.3)
LYMPHOCYTES NFR BLD AUTO: 39 %
MCH RBC QN AUTO: 33.3 PG (ref 26.5–33)
MCHC RBC AUTO-ENTMCNC: 33.3 G/DL (ref 31.5–36.5)
MCV RBC AUTO: 100 FL (ref 78–100)
MONOCYTES # BLD AUTO: 0.3 10E3/UL (ref 0–1.3)
MONOCYTES NFR BLD AUTO: 8 %
NEUTROPHILS # BLD AUTO: 1.8 10E3/UL (ref 1.6–8.3)
NEUTROPHILS NFR BLD AUTO: 48 %
NRBC # BLD AUTO: 0 10E3/UL
NRBC BLD AUTO-RTO: 0 /100
PLATELET # BLD AUTO: 75 10E3/UL (ref 150–450)
POTASSIUM BLD-SCNC: 3.1 MMOL/L (ref 3.4–5.3)
PROT SERPL-MCNC: 6.4 G/DL (ref 6.8–8.8)
RBC # BLD AUTO: 3 10E6/UL (ref 3.8–5.2)
SODIUM SERPL-SCNC: 140 MMOL/L (ref 133–144)
SPECIMEN EXPIRATION DATE: NORMAL
WBC # BLD AUTO: 3.6 10E3/UL (ref 4–11)

## 2022-01-26 PROCEDURE — 96374 THER/PROPH/DIAG INJ IV PUSH: CPT | Mod: 59 | Performed by: EMERGENCY MEDICINE

## 2022-01-26 PROCEDURE — 85610 PROTHROMBIN TIME: CPT | Performed by: EMERGENCY MEDICINE

## 2022-01-26 PROCEDURE — 80053 COMPREHEN METABOLIC PANEL: CPT | Performed by: EMERGENCY MEDICINE

## 2022-01-26 PROCEDURE — 85025 COMPLETE CBC W/AUTO DIFF WBC: CPT | Performed by: EMERGENCY MEDICINE

## 2022-01-26 PROCEDURE — 99285 EMERGENCY DEPT VISIT HI MDM: CPT | Performed by: EMERGENCY MEDICINE

## 2022-01-26 PROCEDURE — 86901 BLOOD TYPING SEROLOGIC RH(D): CPT | Performed by: EMERGENCY MEDICINE

## 2022-01-26 PROCEDURE — 99285 EMERGENCY DEPT VISIT HI MDM: CPT | Mod: 25 | Performed by: EMERGENCY MEDICINE

## 2022-01-26 PROCEDURE — 36415 COLL VENOUS BLD VENIPUNCTURE: CPT | Performed by: EMERGENCY MEDICINE

## 2022-01-26 PROCEDURE — 250N000011 HC RX IP 250 OP 636: Performed by: EMERGENCY MEDICINE

## 2022-01-26 PROCEDURE — 85730 THROMBOPLASTIN TIME PARTIAL: CPT | Performed by: EMERGENCY MEDICINE

## 2022-01-26 PROCEDURE — 93005 ELECTROCARDIOGRAM TRACING: CPT | Performed by: EMERGENCY MEDICINE

## 2022-01-26 PROCEDURE — 82140 ASSAY OF AMMONIA: CPT | Performed by: EMERGENCY MEDICINE

## 2022-01-26 RX ORDER — LORAZEPAM 2 MG/ML
0.5 INJECTION INTRAMUSCULAR ONCE
Status: COMPLETED | OUTPATIENT
Start: 2022-01-26 | End: 2022-01-26

## 2022-01-26 RX ORDER — POTASSIUM CHLORIDE 750 MG/1
40 TABLET, EXTENDED RELEASE ORAL ONCE
Status: DISCONTINUED | OUTPATIENT
Start: 2022-01-26 | End: 2022-01-27 | Stop reason: HOSPADM

## 2022-01-26 RX ADMIN — LORAZEPAM 0.5 MG: 2 INJECTION INTRAMUSCULAR; INTRAVENOUS at 23:31

## 2022-01-26 ASSESSMENT — ENCOUNTER SYMPTOMS
DIARRHEA: 1
WEAKNESS: 1
CHILLS: 0
ABDOMINAL PAIN: 0
FREQUENCY: 0
ARTHRALGIAS: 0
VOMITING: 0
DYSURIA: 0
ABDOMINAL DISTENTION: 0
MYALGIAS: 0
EYE PAIN: 0
DIZZINESS: 0
FATIGUE: 0
SORE THROAT: 0
CONSTIPATION: 0
SHORTNESS OF BREATH: 0
LIGHT-HEADEDNESS: 1
SPEECH DIFFICULTY: 1
CHEST TIGHTNESS: 0
NAUSEA: 1
COUGH: 0
HEADACHES: 0
COLOR CHANGE: 0
FEVER: 0
BACK PAIN: 1
NECK PAIN: 0
PALPITATIONS: 0
DIFFICULTY URINATING: 0
CONFUSION: 1

## 2022-01-26 NOTE — PROGRESS NOTES
Pt reported having heavy vaginal bleeding and cramping that started on 1/25. Pt has not had her period since 8/2020. Pt stated that yesterday she was soaking through a pad every 2-3 hours but the bleeding is less today. Pt is also having diarrhea which she stated typically accompanied her period in the past. Pt took one dose of lactulose today and has had at least 4 BMs. Denied confusion and pt alert and oriented x 4 throughout conversation. Pt and spouse are wondering if pt needs to take more lactulose. Discussed lactulose titration to achieve 3-5 bowel movements and to confusion. Reviewed that pt has met goal bowel movements and therefore does not need to take a dose of lactulose unless pt and spouse start noticing confusion.     Pt spoke with nursing staff at primary care office and was instructed to go to the emergency room or urgent care for further evaluation of bleeding. Pt plans to go to the [facility] in [place] to be assessed. Pt will get repeat BMP drawn at this time. Will check in with pt in 1-2 days.

## 2022-01-27 ENCOUNTER — APPOINTMENT (OUTPATIENT)
Dept: CT IMAGING | Facility: CLINIC | Age: 52
End: 2022-01-27
Attending: EMERGENCY MEDICINE
Payer: COMMERCIAL

## 2022-01-27 VITALS
SYSTOLIC BLOOD PRESSURE: 99 MMHG | TEMPERATURE: 97.8 F | RESPIRATION RATE: 16 BRPM | HEART RATE: 67 BPM | OXYGEN SATURATION: 95 % | DIASTOLIC BLOOD PRESSURE: 60 MMHG

## 2022-01-27 LAB
ALBUMIN UR-MCNC: NEGATIVE MG/DL
APPEARANCE UR: CLEAR
APTT PPP: 39 SECONDS (ref 22–38)
ATRIAL RATE - MUSE: 68 BPM
BILIRUB UR QL STRIP: NEGATIVE
COLOR UR AUTO: ABNORMAL
DIASTOLIC BLOOD PRESSURE - MUSE: NORMAL MMHG
GLUCOSE UR STRIP-MCNC: NEGATIVE MG/DL
HCG UR QL: NEGATIVE
HGB UR QL STRIP: ABNORMAL
INR PPP: 1.85 (ref 0.85–1.15)
INTERPRETATION ECG - MUSE: NORMAL
KETONES UR STRIP-MCNC: NEGATIVE MG/DL
LEUKOCYTE ESTERASE UR QL STRIP: NEGATIVE
MUCOUS THREADS #/AREA URNS LPF: PRESENT /LPF
NITRATE UR QL: NEGATIVE
P AXIS - MUSE: 35 DEGREES
PH UR STRIP: 5.5 [PH] (ref 5–7)
PR INTERVAL - MUSE: 136 MS
QRS DURATION - MUSE: 92 MS
QT - MUSE: 486 MS
QTC - MUSE: 516 MS
R AXIS - MUSE: -20 DEGREES
RBC URINE: 12 /HPF
SP GR UR STRIP: 1.01 (ref 1–1.03)
SQUAMOUS EPITHELIAL: <1 /HPF
SYSTOLIC BLOOD PRESSURE - MUSE: NORMAL MMHG
T AXIS - MUSE: 18 DEGREES
UROBILINOGEN UR STRIP-MCNC: NORMAL MG/DL
VENTRICULAR RATE- MUSE: 68 BPM
WBC URINE: 1 /HPF

## 2022-01-27 PROCEDURE — 250N000011 HC RX IP 250 OP 636: Performed by: EMERGENCY MEDICINE

## 2022-01-27 PROCEDURE — 81025 URINE PREGNANCY TEST: CPT | Performed by: EMERGENCY MEDICINE

## 2022-01-27 PROCEDURE — 96376 TX/PRO/DX INJ SAME DRUG ADON: CPT | Performed by: EMERGENCY MEDICINE

## 2022-01-27 PROCEDURE — 81001 URINALYSIS AUTO W/SCOPE: CPT | Performed by: EMERGENCY MEDICINE

## 2022-01-27 PROCEDURE — 96375 TX/PRO/DX INJ NEW DRUG ADDON: CPT | Performed by: EMERGENCY MEDICINE

## 2022-01-27 PROCEDURE — 74177 CT ABD & PELVIS W/CONTRAST: CPT | Mod: 26 | Performed by: RADIOLOGY

## 2022-01-27 PROCEDURE — 74177 CT ABD & PELVIS W/CONTRAST: CPT

## 2022-01-27 PROCEDURE — 250N000009 HC RX 250: Performed by: EMERGENCY MEDICINE

## 2022-01-27 PROCEDURE — 250N000013 HC RX MED GY IP 250 OP 250 PS 637: Performed by: EMERGENCY MEDICINE

## 2022-01-27 RX ORDER — LORAZEPAM 0.5 MG/1
0.5 TABLET ORAL EVERY 6 HOURS PRN
Qty: 5 TABLET | Refills: 0 | Status: ON HOLD | OUTPATIENT
Start: 2022-01-27 | End: 2022-02-21

## 2022-01-27 RX ORDER — HYDROMORPHONE HYDROCHLORIDE 1 MG/ML
0.5 INJECTION, SOLUTION INTRAMUSCULAR; INTRAVENOUS; SUBCUTANEOUS ONCE
Status: COMPLETED | OUTPATIENT
Start: 2022-01-27 | End: 2022-01-27

## 2022-01-27 RX ORDER — ONDANSETRON 4 MG/1
4 TABLET, ORALLY DISINTEGRATING ORAL ONCE
Status: COMPLETED | OUTPATIENT
Start: 2022-01-27 | End: 2022-01-27

## 2022-01-27 RX ORDER — ONDANSETRON 2 MG/ML
4 INJECTION INTRAMUSCULAR; INTRAVENOUS EVERY 30 MIN PRN
Status: DISCONTINUED | OUTPATIENT
Start: 2022-01-27 | End: 2022-01-27 | Stop reason: HOSPADM

## 2022-01-27 RX ORDER — IOPAMIDOL 755 MG/ML
78 INJECTION, SOLUTION INTRAVASCULAR ONCE
Status: COMPLETED | OUTPATIENT
Start: 2022-01-27 | End: 2022-01-27

## 2022-01-27 RX ORDER — OXYCODONE HYDROCHLORIDE 5 MG/1
5 TABLET ORAL ONCE
Status: DISCONTINUED | OUTPATIENT
Start: 2022-01-27 | End: 2022-01-27 | Stop reason: HOSPADM

## 2022-01-27 RX ADMIN — HYDROMORPHONE HYDROCHLORIDE 0.5 MG: 1 INJECTION, SOLUTION INTRAMUSCULAR; INTRAVENOUS; SUBCUTANEOUS at 09:25

## 2022-01-27 RX ADMIN — ONDANSETRON 4 MG: 2 INJECTION INTRAMUSCULAR; INTRAVENOUS at 09:24

## 2022-01-27 RX ADMIN — IOPAMIDOL 78 ML: 755 INJECTION, SOLUTION INTRAVENOUS at 06:39

## 2022-01-27 RX ADMIN — HYDROMORPHONE HYDROCHLORIDE 0.5 MG: 1 INJECTION, SOLUTION INTRAMUSCULAR; INTRAVENOUS; SUBCUTANEOUS at 00:59

## 2022-01-27 RX ADMIN — SODIUM CHLORIDE, PRESERVATIVE FREE 10 ML: 5 INJECTION INTRAVENOUS at 06:40

## 2022-01-27 RX ADMIN — ONDANSETRON 4 MG: 4 TABLET, ORALLY DISINTEGRATING ORAL at 09:07

## 2022-01-27 NOTE — DISCHARGE INSTRUCTIONS
Exact cause of your symptoms is unclear at this time.  Your laboratory work-up did not show any signs of any major medical emergency.  Recommend you follow-up with your liver specialist team.  You should also follow-up with your primary care physician to discuss treatment for your anxiety and to help coordinate care.

## 2022-01-27 NOTE — ED TRIAGE NOTES
Pt c/o lightheadedness, weakness, confusion and slurring words starting around 2pm today.  C/o heavy bleeding d/t menopause, nausea, diarrhea  Hasn't had period since August 2020, got period yesterday and has been soaking pads frequently    Pt has taken 2 out of 3 doses of lactulose today  Hx liver cirrhosis (on txp list)

## 2022-01-27 NOTE — PROGRESS NOTES
"Per chart review, pt presented to the emergency room after experiencing increased confusion on the way to urgent care. Pt's labs were stable and CT of abdomen and pelvis showed no acute findings. Pt was discharged to home.     Called pt to check in and review plan of care. Pt reported that she decided to go to the emergency room vs urgent care after experiencing slurred speech and confusion in the car. Pt took another dose of lactulose on the way to the ER. Pt stated that she had a panic attack in the ER and that \"it was the worst ER visit of my life\". Pt crying throughout conversation and expressed frustration with visit and feeling like she was not being heard by medical team. Listened to pt's frustrations and concerns. Discussed the importance of optimizing pt's mental health and reaching out to primary care team and Sancho Gaines regarding prevention and management of panic attacks. Pt plans to call PCP and has appointment with Sancho on 2/1.    Pt still has vaginal bleeding but stated that it continues to decrease. Pt reported having 2 BMs so far today. Pt has not taken any lactulose. Advised pt to take a dose of lactulose and emphasized that it is essential for pt to titrate lactulose to achieve 3-5 BMs/day and to confusion.     Will check in with pt in 1 week. Pt verbalized understanding and is agreeable to plan of care.   "

## 2022-01-27 NOTE — ED PROVIDER NOTES
[facility] ([facility] [place] [facility])  1/26/22      History     Chief Complaint   Patient presents with     Altered Mental Status     Generalized Weakness     HPI  Susan Puckett is a 51 year old female with a past medical history significant for CUETO cirrhosis c/b varices, hydrothorax, hepatic encephalopathy s/p TIPS 11/5/2021, DMII, s/p RnY gastric bypass (2006), DVT on Eliquis presents to the ED for evaluation of altered mental status.  Around 2 PM today patient began to feel lightheaded, weak, confused, and was slurring her words.  Also reports diarrhea, nausea, and heavy menstrual flow for 2 days.  She has not had a period since August 2020.  Her doctor told her to present to urgent care or the ER.  Here in the ED, she also reports having a panic attack and being confused.  Patient also endorses back pain and pelvic pain and cramping.  She states this cramping feels like her cramping she gets with her period.  Patient states that she has endometriosis and has undergone IVF in the past.  She states she believes her potassium and vitamin D are low.  The patient is waiting on the liver transplant list.  She endorses having lactulose which she states she was instructed not to take unless feeling confused, she took before presenting to the ER.    Per chart review patient presented to [facility] 1/14/2022 for generalized weakness.  She reported a weight increase from 165-179 over the course of a week and felt swollen all over.  She also reported abdominal distention, pain, nausea, and vomiting.  LFTs were at patient's baseline.  Hemoglobin was stable.  Creatinine was normal.  Electrolytes were unremarkable.  Patient had previously been on Lasix before her TIPS procedure and patient agreed with plan to resume Lasix.  Patient was discharged to follow-up with her hepatologist.    Patient was also recently admitted to [facility] 12/26/2021-12/28/2021 for hepatic encephalopathy.  Symptoms were thought to be caused  by a combination of dehydration from diuretics along with insufficient lactulose intake.  Patient was encouraged to continue taking lactulose. Furosemide and spironolactone were stopped.  She was discharged to follow-up with hepatology and her PCP.    Past Medical History  Past Medical History:   Diagnosis Date     Anemia      Anxiety      Cold sore      Depressive disorder      Diabetes (H)     Type 2 DM/No Insulin      Encephalopathy      Esophageal varices without bleeding (H)     grade I     History of blood transfusion     10/2019     History of seizure     diabetic seizure     Insomnia      Liver cirrhosis secondary to CUETO (H) 05/28/2020     Migraine      Pleural effusion      Thrombocytopenia (H)      Thyroid disease      Past Surgical History:   Procedure Laterality Date     APPENDECTOMY      1989     COLONOSCOPY      1/2020 at [facility]      ENT SURGERY  1998    tempanoplasty     GI SURGERY      EGD August 2020 at [facility]      GYN SURGERY  2010    laparoscopic ablation     IR TRANSVEN INTRAHEPATIC PORTOSYST SHUNT  11/5/2021     MAMMOPLASTY REDUCTION BILATERAL  2004     CO STAB PHELBECTOMY VARICOSE VEINS, LESS THAN 10 INCISIONS, ONE EXTREMITY  2020     RNY gastric bypass  01/2006    retoocolic, retrogastric, [facility]     TONSILLECTOMY & ADENOIDECTOMY Bilateral 1978     WISDOM TEETH EXTRACTION       LORazepam (ATIVAN) 0.5 MG tablet  acetaminophen (TYLENOL) 500 MG tablet  acetone urine (KETOSTIX) test strip  albuterol (PROAIR HFA/PROVENTIL HFA/VENTOLIN HFA) 108 (90 Base) MCG/ACT inhaler  amylase-lipase-protease (CREON 6) 5189-99124-06659 units CPEP  atorvastatin (LIPITOR) 20 MG tablet  calcium carbonate (TUMS) 500 MG chewable tablet  calcium citrate-vitamin D (CITRACAL W/D) 250-100 MG-UNIT tablet  Continuous Blood Gluc  (DEXCOM G6 ) LUNA  Continuous Blood Gluc Sensor (DEXCOM G6 SENSOR) MISC  Continuous Blood Gluc Transmit (DEXCOM G6 TRANSMITTER) MISC  cyanocobalamin (VITAMIN B-12)  1000 MCG tablet  Ferrous Sulfate (IRON) 325 (65 FE) MG tablet  folic acid (FOLVITE) 1 MG tablet  furosemide (LASIX) 40 MG tablet  hydrOXYzine (ATARAX) 25 MG tablet  insulin aspart (NOVOLOG FLEXPEN) 100 UNIT/ML pen  insulin glargine (LANTUS PEN) 100 UNIT/ML pen  insulin pen needle (BD GRISEL U/F) 32G X 4 MM miscellaneous  lactulose (CHRONULAC) 10 GM/15ML solution  levothyroxine (SYNTHROID/LEVOTHROID) 200 MCG tablet  metoclopramide (REGLAN) 10 MG tablet  Multiple Vitamin (MULTIVITAMIN PO)  multivitamin CF formula (AQUADEKS) chewable tablet  omeprazole (PRILOSEC) 20 MG DR capsule  ondansetron (ZOFRAN-ODT) 4 MG ODT tab  potassium chloride ER (K-TAB) 20 MEQ CR tablet  rifampin (RIFADIN) 300 MG capsule  rifaximin (XIFAXAN) 550 MG TABS tablet  Rimegepant Sulfate 75 MG TBDP  simethicone (MYLICON) 80 MG chewable tablet  sitagliptin (JANUVIA) 100 MG tablet  spironolactone (ALDACTONE) 50 MG tablet  SUMAtriptan (IMITREX) 50 MG tablet  topiramate (TOPAMAX) 50 MG tablet  traZODone (DESYREL) 100 MG tablet  valACYclovir (VALTREX) 1000 mg tablet  venlafaxine (EFFEXOR) 75 MG tablet  vitamin A 3 MG (73839 UNITS) capsule  vitamin C (ASCORBIC ACID) 1000 MG TABS  vitamin D2 (ERGOCALCIFEROL) 54211 units (1250 mcg) capsule  vitamin D3 (CHOLECALCIFEROL) 50 mcg (2000 units) tablet  vitamin E (TOCOPHEROL) 400 units (180 mg) capsule      Allergies   Allergen Reactions     Methylprednisolone Hives     Droperidol Anxiety     Nsaids Other (See Comments)     GI bleed.     Prednisone Anxiety, Hives and Rash     Family History  Family History   Problem Relation Age of Onset     Anesthesia Reaction Nephew         PONV     Bleeding Disorder No family hx of      Clotting Disorder No family hx of      Social History   Social History     Tobacco Use     Smoking status: Never Smoker     Smokeless tobacco: Never Used   Substance Use Topics     Alcohol use: Not Currently     Comment: Last drink was in 2017     Drug use: Never      Past medical history, past  surgical history, medications, allergies, family history, and social history were reviewed with the patient. No additional pertinent items.       Review of Systems   Constitutional: Negative for chills, fatigue and fever.   HENT: Negative for congestion and sore throat.    Eyes: Negative for pain and visual disturbance.   Respiratory: Negative for cough, chest tightness and shortness of breath.    Cardiovascular: Negative for chest pain and palpitations.   Gastrointestinal: Positive for diarrhea and nausea. Negative for abdominal distention, abdominal pain, constipation and vomiting.   Genitourinary: Positive for pelvic pain and vaginal bleeding. Negative for difficulty urinating, dysuria, frequency and urgency.   Musculoskeletal: Positive for back pain. Negative for arthralgias, myalgias and neck pain.   Skin: Negative for color change and rash.   Neurological: Positive for speech difficulty, weakness and light-headedness. Negative for dizziness and headaches.   Psychiatric/Behavioral: Positive for confusion.     A complete review of systems was performed with pertinent positives and negatives noted in the HPI, and all other systems negative.    Physical Exam   BP: 109/74  Pulse: 71  Temp: 97.8  F (36.6  C)  Resp: 16  SpO2: 99 %  Physical Exam  Vitals and nursing note reviewed.   Constitutional:       General: She is not in acute distress.     Appearance: Normal appearance. She is not ill-appearing or toxic-appearing.   HENT:      Head: Normocephalic and atraumatic.      Nose: Nose normal.      Mouth/Throat:      Mouth: Mucous membranes are moist.   Eyes:      Pupils: Pupils are equal, round, and reactive to light.   Cardiovascular:      Rate and Rhythm: Normal rate.      Pulses: Normal pulses.      Heart sounds: Normal heart sounds.   Pulmonary:      Effort: Pulmonary effort is normal. No respiratory distress.      Breath sounds: Normal breath sounds.   Abdominal:      General: Abdomen is flat. There is no  distension.   Musculoskeletal:         General: No swelling or deformity. Normal range of motion.      Cervical back: Normal range of motion. No rigidity.   Skin:     General: Skin is warm.      Capillary Refill: Capillary refill takes less than 2 seconds.   Neurological:      Mental Status: She is alert and oriented to person, place, and time.   Psychiatric:         Mood and Affect: Mood normal.          ED Course     10:11 PM  The patient was seen and examined by Renzo Zuñiga MD in Room ED12.            Results for orders placed or performed during the hospital encounter of 01/26/22   CT Abdomen Pelvis w Contrast     Status: None    Narrative    EXAM: CT ABDOMEN PELVIS W CONTRAST  LOCATION: [facility]  DATE/TIME: 1/27/2022 6:25 AM    INDICATION: Diarrhea, nausea. Pelvic pain and cramping.  COMPARISON: 12/3/2021  TECHNIQUE: CT scan of the abdomen and pelvis was performed following injection of IV contrast. Multiplanar reformats were obtained. Dose reduction techniques were used.  CONTRAST: 78 mL Isovue 370     FINDINGS:   LOWER CHEST: Normal.    HEPATOBILIARY: Diffuse heterogeneity and nodular contour of the liver compatible with underlying hepatic parenchymal disease/cirrhosis. Patent TIPS catheter in the right hepatic lobe. No calcified gallstones or biliary dilatation.    PANCREAS: Fatty atrophy of the pancreas. Pancreas is otherwise negative.    SPLEEN: Normal.    ADRENAL GLANDS: Normal.    KIDNEYS/BLADDER: Normal.    BOWEL: Postoperative changes gastric bypass surgery. No evidence for obstruction. No evidence for appendicitis. There are some questionable segmental areas of mild colonic wall thickening including the ascending colon, descending colon, and in the distal   sigmoid colon/rectum. This is a borderline finding but could be seen with a mild to low-grade nonspecific colitis. Clinical correlation.    LYMPH NODES: Normal.    VASCULATURE:  Unremarkable.    PELVIC ORGANS: Normal.    MUSCULOSKELETAL: Normal.      Impression    IMPRESSION:   1.  There are some questionable segmental areas of mild wall thickening scattered in the colon as above. This is a borderline finding on CT and clinical correlation is recommended for any signs of a mild or low-grade nonspecific colitis.    2.  Cirrhotic appearance of the liver with diffuse heterogeneity of the hepatic parenchyma and nodular contour of the liver surface.    3.  Patent TIPS catheter in the right hepatic lobe.    4.  Postoperative changes gastric bypass surgery. No evidence for bowel obstruction.   Comprehensive metabolic panel     Status: Abnormal   Result Value Ref Range    Sodium 140 133 - 144 mmol/L    Potassium 3.1 (L) 3.4 - 5.3 mmol/L    Chloride 108 94 - 109 mmol/L    Carbon Dioxide (CO2) 27 20 - 32 mmol/L    Anion Gap 5 3 - 14 mmol/L    Urea Nitrogen 3 (L) 7 - 30 mg/dL    Creatinine 1.04 0.52 - 1.04 mg/dL    Calcium 7.9 (L) 8.5 - 10.1 mg/dL    Glucose 125 (H) 70 - 99 mg/dL    Alkaline Phosphatase 216 (H) 40 - 150 U/L    AST 35 0 - 45 U/L    ALT 26 0 - 50 U/L    Protein Total 6.4 (L) 6.8 - 8.8 g/dL    Albumin 2.4 (L) 3.4 - 5.0 g/dL    Bilirubin Total 1.6 (H) 0.2 - 1.3 mg/dL    GFR Estimate 66 >60 mL/min/1.73m2   Ammonia     Status: Normal   Result Value Ref Range    Ammonia 25 10 - 50 umol/L   INR     Status: Abnormal   Result Value Ref Range    INR 1.85 (H) 0.85 - 1.15   Partial thromboplastin time     Status: Abnormal   Result Value Ref Range    aPTT 39 (H) 22 - 38 Seconds   UA with Microscopic reflex to Culture     Status: Abnormal    Specimen: Urine, Midstream   Result Value Ref Range    Color Urine Light Yellow Colorless, Straw, Light Yellow, Yellow    Appearance Urine Clear Clear    Glucose Urine Negative Negative mg/dL    Bilirubin Urine Negative Negative    Ketones Urine Negative Negative mg/dL    Specific Gravity Urine 1.008 1.003 - 1.035    Blood Urine Large (A) Negative    pH Urine  5.5 5.0 - 7.0    Protein Albumin Urine Negative Negative mg/dL    Urobilinogen Urine Normal Normal, 2.0 mg/dL    Nitrite Urine Negative Negative    Leukocyte Esterase Urine Negative Negative    Mucus Urine Present (A) None Seen /LPF    RBC Urine 12 (H) <=2 /HPF    WBC Urine 1 <=5 /HPF    Squamous Epithelials Urine <1 <=1 /HPF    Narrative    Urine Culture not indicated   HCG qualitative urine (UPT)     Status: Normal   Result Value Ref Range    hCG Urine Qualitative Negative Negative   CBC with platelets and differential     Status: Abnormal   Result Value Ref Range    WBC Count 3.6 (L) 4.0 - 11.0 10e3/uL    RBC Count 3.00 (L) 3.80 - 5.20 10e6/uL    Hemoglobin 10.0 (L) 11.7 - 15.7 g/dL    Hematocrit 30.0 (L) 35.0 - 47.0 %     78 - 100 fL    MCH 33.3 (H) 26.5 - 33.0 pg    MCHC 33.3 31.5 - 36.5 g/dL    RDW 17.7 (H) 10.0 - 15.0 %    Platelet Count 75 (L) 150 - 450 10e3/uL    % Neutrophils 48 %    % Lymphocytes 39 %    % Monocytes 8 %    % Eosinophils 4 %    % Basophils 1 %    % Immature Granulocytes 0 %    NRBCs per 100 WBC 0 <1 /100    Absolute Neutrophils 1.8 1.6 - 8.3 10e3/uL    Absolute Lymphocytes 1.4 0.8 - 5.3 10e3/uL    Absolute Monocytes 0.3 0.0 - 1.3 10e3/uL    Absolute Eosinophils 0.1 0.0 - 0.7 10e3/uL    Absolute Basophils 0.0 0.0 - 0.2 10e3/uL    Absolute Immature Granulocytes 0.0 <=0.4 10e3/uL    Absolute NRBCs 0.0 10e3/uL   EKG 12 lead     Status: None   Result Value Ref Range    Systolic Blood Pressure  mmHg    Diastolic Blood Pressure  mmHg    Ventricular Rate 68 BPM    Atrial Rate 68 BPM    MI Interval 136 ms    QRS Duration 92 ms     ms    QTc 516 ms    P Axis 35 degrees    R AXIS -20 degrees    T Axis 18 degrees    Interpretation ECG       Sinus rhythm  Minimal voltage criteria for LVH, may be normal variant  Cannot rule out Anterior infarct , age undetermined  Prolonged QT  Abnormal ECG  Unconfirmed report - interpretation of this ECG is computer generated - see medical record for final  interpretation  Confirmed by - EMERGENCY ROOM, PHYSICIAN ([id number]),  URI JUNG ([id number]) on 1/27/2022 5:19:54 PM     Adult Type and Screen     Status: None   Result Value Ref Range    ABO/RH(D) A POS     Antibody Screen Negative Negative    SPECIMEN EXPIRATION DATE [id number]    CBC with platelets differential     Status: Abnormal    Narrative    The following orders were created for panel order CBC with platelets differential.  Procedure                               Abnormality         Status                     ---------                               -----------         ------                     CBC with platelets and d...[[id number]]  Abnormal            Final result                 Please view results for these tests on the individual orders.   ABO/Rh type and screen     Status: None    Narrative    The following orders were created for panel order ABO/Rh type and screen.  Procedure                               Abnormality         Status                     ---------                               -----------         ------                     Adult Type and Screen[[id number]]                            Final result                 Please view results for these tests on the individual orders.     Medications   LORazepam (ATIVAN) injection 0.5 mg (0.5 mg Intravenous Given 1/26/22 6511)   HYDROmorphone (PF) (DILAUDID) injection 0.5 mg (0.5 mg Intravenous Given 1/27/22 0059)   sodium chloride 0.9 % bag 500mL for CT scan flush use (10 mLs Intravenous Given 1/27/22 0640)   iopamidol (ISOVUE-370) solution 78 mL (78 mLs Intravenous Given 1/27/22 0639)   HYDROmorphone (PF) (DILAUDID) injection 0.5 mg (0.5 mg Intravenous Given 1/27/22 0925)   ondansetron (ZOFRAN-ODT) ODT tab 4 mg (4 mg Oral Given 1/27/22 0907)        Assessments & Plan (with Medical Decision Making)   Patient with history of Du cirrhosis status post TIPS procedure presents to the ED for evaluation of a variety of complaints.  She notes that  she has not felt herself all day.  She has lower abdominal pain.  She also notes a recurrence of her menstrual cycle (feels that she is going through menopause), and is having diarrhea    On arrival, patient is extremely anxious.  She is crying during the exam.  She otherwise appears nontoxic.  She has normal vital signs.  Her abdomen with tenderness in the bilateral lower quadrants, otherwise soft.  She has not experienced any high-volume vaginal bleeding while in the ED.  Her neuro exam is nonfocal.  She is alert and oriented with otherwise normal mentation.  GCS 15.    Laboratory work-up without any leukocytosis.  Potassium mildly decreased at 3.1.  Patient given 40 mEq oral potassium.  Electrolytes otherwise within normal limits.  Normal renal function.  No significant transaminitis.  Bilirubin baseline.  Ammonia 25.    Patient was given Ativan with significant improvement of her symptoms.  Is also given IV pain medication.  She continues to complain of lower abdominal pain.  CT scan ordered and is pending.  Will sign out to overnight provider to follow-up on results of CT scan.  Anticipate discharge with hepatology/PCP/OB/GYN follow-up if CT negative.    I have reviewed the nursing notes. I have reviewed the findings, diagnosis, plan and need for follow up with the patient.    Discharge Medication List as of 1/27/2022  7:30 AM      START taking these medications    Details   LORazepam (ATIVAN) 0.5 MG tablet Take 1 tablet (0.5 mg) by mouth every 6 hours as needed for anxiety, Disp-5 tablet, R-0, Local Print             Final diagnoses:   Abdominal pain, generalized     Angeles OLIVAS, pam serving as a trained medical scribe to document services personally performed by Renzo Zuñiga MD based on the provider's statements to me on January 26, 2022.  This document has been checked and approved by the attending provider.    IRenzo MD, was physically present and have reviewed and verified the accuracy of  this note documented by Angeles Dao, medical scribe.      Renzo Zuñiga MD      --  Renzo Zuñiga MD  [facility] EMERGENCY DEPARTMENT  1/26/2022     Renzo Zuñiga DO  01/28/22 0145

## 2022-01-30 ENCOUNTER — TELEPHONE (OUTPATIENT)
Dept: TRANSPLANT | Facility: CLINIC | Age: 52
End: 2022-01-30
Payer: COMMERCIAL

## 2022-01-31 NOTE — TELEPHONE ENCOUNTER
Susan, paged that she fell in the shower. Patient is crying due to pain. Patient stated she was getting out of the shower and grabbing for towel and fell. Reports that she hit her head and her back. States  the Pain is so bad that she is on verge of throwing up. She has bruising and swelling, no open cuts or bleeding. Pt did not feel any bumps on her head. . Patient encouraged to be evaluated.

## 2022-02-01 ENCOUNTER — PATIENT OUTREACH (OUTPATIENT)
Dept: GASTROENTEROLOGY | Facility: CLINIC | Age: 52
End: 2022-02-01
Payer: COMMERCIAL

## 2022-02-01 ENCOUNTER — VIRTUAL VISIT (OUTPATIENT)
Dept: BEHAVIORAL HEALTH | Facility: CLINIC | Age: 52
End: 2022-02-01
Payer: COMMERCIAL

## 2022-02-01 DIAGNOSIS — F41.1 GENERALIZED ANXIETY DISORDER: ICD-10-CM

## 2022-02-01 DIAGNOSIS — F33.1 MAJOR DEPRESSIVE DISORDER, RECURRENT EPISODE, MODERATE (H): Primary | ICD-10-CM

## 2022-02-01 PROCEDURE — 90834 PSYTX W PT 45 MINUTES: CPT | Mod: GT | Performed by: PSYCHOLOGIST

## 2022-02-01 NOTE — PROGRESS NOTES
Per chart review, pt presented to the ER on 1/30 for evaluation of back pain after falling in the shower. Pt hit her head but denied LOC. CT of head and CT of chest/abdomen/pelvis performed. No acute findings noted and pt was discharged to home with a prescription for flexeril for pain as needed.     Pt reported feeling sore but denied headaches, nausea/vomitting, and dizziness. Discussed pt's fall and what lead to the event. Pt stated that she lost her balance and fell. Encouraged pt to use her shower chair and shower with assistance if needed. Pt verbalized understanding and is agreeable to plan.     Pt reported compliance with lactulose and is having at least 3 BMs/day. Denied experiencing confusion. Pt plans to drive to [place] with , Roly, in a couple of weeks to see family. Pt stated that all family members that she will be in contact with are vaccinated and boosted against COVID. Pt has KN95 masks and plans to wear these masks when out in public spaces. Reiterated the importance of bringing all medications with and taking all medications as prescribed.

## 2022-02-01 NOTE — PROGRESS NOTES
[facility]: Integrated Behavioral Health  February 1, 2022      Behavioral Health Clinician Progress Note    Patient Name: Susan Puckett           Service Type: Individual      Service Location:  Video visit      Session Start Time: 3:02  Session End Time: 3:50      Session Length: 38 - 52      Attendees: Patient    Visit Activities (Refresh list every visit): [facility] Only    Telemedicine Visit: The patient's condition can be safely assessed and treated via synchronous audio and visual telemedicine encounter.      Reason for Telemedicine Visit: Services only offered telehealth    Originating Site (Patient Location): Patient's home    Distant Site (Provider Location): Provider Remote Setting    Consent:  The patient/guardian has verbally consented to: the potential risks and benefits of telemedicine (video visit) versus in person care; bill my insurance or make self-payment for services provided; and responsibility for payment of non-covered services.     Mode of Communication:  Video Conference via [employer]    As the provider I attest to compliance with applicable laws and regulations related to telemedicine.    Diagnostic Assessment Date: Update on 1/21/2022  Treatment Plan Review Date 5/1/2022  See Flowsheets for today's PHQ-9 and AILIN-7 results  Previous PHQ-9:   PHQ-9 SCORE 11/5/2020 2/10/2021 1/21/2022   PHQ-9 Total Score MyChart 10 (Moderate depression) 8 (Mild depression) 16 (Moderately severe depression)   PHQ-9 Total Score 10 8 16     Previous AILIN-7:   AILIN-7 SCORE 11/5/2020 2/10/2021   Total Score 10 (moderate anxiety) 3 (minimal anxiety)   Total Score 10 3       LUIS LEVEL:  LUIS Score (Last Two) 10/18/2020   LUIS Raw Score 35   Activation Score 72.1   LUIS Level 3       DATA  Extended Session (60+ minutes): No  Interactive Complexity: No  Crisis: No    Treatment Objective(s) Addressed in This Session:  Target Behavior(s): disease management/lifestyle changes      Depressed  Mood: Decrease frequency and intensity of feeling down, depressed, hopeless  Relationship Problems: will address relationship difficulties in a more adaptive manner  Psychological distress related to Chronic Disease Management    Current Stressors / Issues:  Susan completed a [facility] follow-up today. Susan presents today with depressed mood and tearful affect. She reports struggling emotionally with her management of her health difficulties. She reports her health decline has been very hard on her, noting she struggles with fatigue and difficulties doing things she would prefer during the day. She also reports struggling more with her health because of changes she experiencing with her support system. Notes she is experiencing more arguments/conflicts with her . She reports struggling with the sense of distrust she feels her  has toward her, if she is doing everything she is supposed to with her medical care. She also describes a few recent invalidating and frustrating experiences she has experienced with her care. Susan notes she was recently seen in the ED and had to wait 15 some hours to be seen and ultimately discharged to home. [facility] provided Susan supportive psychotherapy and utilized a solution-focused framework to help Susan identify options for change. She discussed the possibly of going to visit family in [place] for a while with her . Discussed the implications of this with transplant and she reports understanding the risks. Reports having a plan should an organ become available while she is gone.         Progress on Treatment Objective(s) / Homework:  Worsening - ACTION (Actively working towards change); Intervened by reinforcing change plan / affirming steps taken    Supportive and solution-focused counseling emphasized today    Care Plan review completed: Yes    Medication Review:  No changes to current psychiatric medication(s)    Medication Compliance:  Yes    Changes in Health Issues:   Yes:  Chronic disease management, Associated Psychological Distress    Chemical Use Review:   Substance Use: Chemical use reviewed, no active concerns identified      Tobacco Use: No current tobacco use.      Assessment: Current Emotional / Mental Status (status of significant symptoms):  Risk status (Self / Other harm or suicidal ideation)  Patient denies a history of suicidal ideation, suicide attempts, self-injurious behavior, homicidal ideation, homicidal behavior and and other safety concerns     Patient denies current fears or concerns for personal safety.  Patient denies current or recent suicidal ideation or behaviors.  Patient denies current or recent homicidal ideation or behaviors.  Patient denies current or recent self injurious behavior or ideation.  Patient denies other safety concerns.     [place] Suicide Severity Rating Scale (Short Version)  [place] Suicide Severity Rating (Short Version) 11/5/2021 11/6/2021 12/13/2021 12/26/2021 12/26/2021 1/13/2022 1/26/2022   Over the past 2 weeks have you felt down, depressed, or hopeless? no no no patient unable to complete yes yes no   Over the past 2 weeks have you had thoughts of killing yourself? no no no patient unable to complete no no no   Have you ever attempted to kill yourself? no no no patient unable to complete no no no     [place] Suicide Severity Rating Scale (Lifetime/Recent)  [place] Suicide Severity Rating (Lifetime/Recent) 1/24/2022   1. Wish to be Dead (Lifetime) No   1. Wish to be Dead (Recent) No   2. Non-Specific Active Suicidal Thoughts (Lifetime) No   2. Non-Specific Active Suicidal Thoughts (Recent) No   3. Active Suicidal Ideation with any Methods (Not Plan) Without Intent to Act (Lifetime) No   3. Active Suicidal Ideation with any Methods (Not Plan) Without Intent to Act (Recent) No   4. Active Suicidal Ideation with Some Intent to Act, Without Specific Plan (Lifetime) No   4. Active Suicidal Ideation with Some Intent to Act, Without  Specific Plan (Recent) No   5. Active Suicidal Ideation with Specific Plan and Intent (Lifetime) No   5. Active Suicidal Ideation with Specific Plan and Intent (Recent) No   Most Severe Ideation Rating (Lifetime) NA   Frequency (Lifetime) NA   Duration (Lifetime) NA   Controllability (Lifetime) NA   Protective Factors  (Lifetime) NA   Reasons for Ideation (Lifetime) NA   Most Severe Ideation Rating (Past Month) NA   Frequency (Past Month) NA   Duration (Past Month) NA   Controllability (Past Month) NA   Protective Factors (Past Month) NA   Reasons for Ideation (Past Month) NA   Actual Attempt (Lifetime) No   Actual Attempt (Past 3 Months) No   Has subject engaged in non-suicidal self-injurious behavior? (Lifetime) No   Has subject engaged in non-suicidal self-injurious behavior? (Past 3 Months) No   Interrupted Attempts (Lifetime) No   Interrupted Attempts (Past 3 Months) No   Aborted or Self-Interrupted Attempt (Lifetime) No   Aborted or Self-Interrupted Attempt (Past 3 Months) No   Preparatory Acts or Behavior (Lifetime) No   Preparatory Acts or Behavior (Past 3 Months) No   Most Recent Attempt Actual Lethality Code NA   Most Lethal Attempt Actual Lethality Code NA   Initial/First Attempt Actual Lethality Code NA       A safety and risk management plan has not been developed at this time, however patient was encouraged to call [facility] should there be a change in any of these risk factors.    Appearance:   Appropriate   Eye Contact:   Good   Psychomotor Behavior: Normal   Attitude:   Cooperative  Friendly Pleasant  Orientation:   All  Speech   Rate / Production: Normal    Volume:  Normal   Mood:    Sad   Affect:    Appropriate   Thought Content:  Clear   Thought Form:  Coherent  Logical   Insight:    Good     Diagnoses:  1. Major depressive disorder, recurrent episode, moderate (H)    2. Generalized anxiety disorder        Collateral Reports Completed:  Not Applicable    Plan: (Homework, other):  Susan  agreed to return to counseling in about two weeks She was also given Cognitive Behavioral Therapy skills to practice when experiencing depression and sleep Hygeine recommendations, including a handout with tips and strategies.  CD Recommendations: No indications of CD issues. Sancho Gaines, ALEXIS     ______________________________________________________________________    [facility] Integrated Behavioral Health Treatment Plan    Client's Name: Susan Puckett  YOB: 1970    Date: February 1, 2022      DSM-V Diagnoses: 296.32 (F33.1) Major Depressive Disorder, Recurrent Episode, Moderate _; 300.02 (F41.1) Generalized Anxiety Disorder  Psychosocial / Contextual Factors: SOT participant  WHODAS: none on file      Referral / Collaboration:  Will collaborate with care team as indicated during treatment.    Anticipated number of session or this episode of care: 10+      MeasurableTreatment Goal(s) related to diagnosis / functional impairment(s)  Goal 1:  Patient will experience a reduction in depressive and anxious symptoms, along with a corresponding increase in positive emotion and life satisfaction.    Objective #A: Patient will experience a reduction in depressed mood, will develop more effective coping skills to manage depressive symptoms, will develop healthy cognitive patterns and beliefs, will increase ability to function adaptively and will continue to take medications as prescribed / participate in supportive activities and services    Status: Continued - Date(s): February 1, 2022    Objective #B: Patient will experience a reduction in anxiety, will develop more effective coping skills to manage anxiety symptoms, will develop healthy cognitive patterns and beliefs and will increase ability to function adaptively  Status: Continued - Date(s): February 1, 2022    Objective #C: Patient will develop better understanding of triggers and coping strategies to stabilize mood  Status: Continued - Date(s): February 1,  2022    Goal 2:  Patient will identify and increase engagement in valued activity, i.e. improving social connections/relationships, pursuing occupational goals or personally meaningful pursuits, exploration of meaning in life.     Objective #A: Patient will identify meaningful activity in social, occupational and  personal goals, and increase behavioral activation around these goals   Status: Continued - Date(s): February 1, 2022    Objective #B: Patient will address relationship difficulties in a more adaptive manner  Status: Continued - Date(s): February 1, 2022    Objective #C: Patient will develop coping/problem-solving skills to facilitate more adaptive adjustment and will effectively address problems that interfere with adaptive functioning  Status: Continued - Date(s): February 1, 2022      Possible Therapeutic Intervention(s)  Psycho-education regarding mental health diagnoses and treatment options    Supportive Counseling    Insight-oriented Counseling    Skills training    Explore skills useful to client in current situation.    Skills include assertiveness, communication, conflict management, problem-solving, relaxation, etc.    Solution-Focused Therapy    Explore patterns in patient's relationships and discuss options for new behaviors.    Explore patterns in patient's actions and choices and discuss options for new behaviors.    Cognitive-behavioral Therapy    Discuss common cognitive distortions, identify them in patient's life.    Explore ways to challenge, replace, and act against these cognitions.    Acceptance and Commitment Therapy    Explore and identify important values in patient's life.    Discuss ways to commit to behavioral activation around these values.    Psychodynamic psychotherapy    Discuss patient's emotional dynamics and issues and how they impact behaviors.    Explore patient's history of relationships and how they impact present behaviors.    Explore how to work with and make  changes in these schemas and patterns.    Narrative Therapy    Explore the patient's story of his/her life from his/her perspective.    Explore alternate ways of understanding their experience, identifying exceptions, developing new themes.    Interpersonal Psychotherapy    Explore patterns in relationships that are effective or ineffective at helping patient reach their goals, find satisfying experience.    Discuss new patterns or behaviors to engage in for improved social functioning.    Behavioral Activation    Discuss steps patient can take to become more involved in meaningful activity.    Identify barriers to these activities and explore possible solutions.    Mindfulness-Based Strategies    Discuss skills based on development and application of mindfulness.    Skills drawn from compassion-focused therapy, dialectical behavior therapy, mindfulness-based stress reduction, mindfulness-based cognitive therapy, etc.      We have developed these goals together during our work to this point. Patient has assisted in the development of these goals and has agreed to this treatment plan.       Sancho Gaines LP  February 1, 2022

## 2022-02-02 NOTE — PROGRESS NOTES
Pt's spouse, Roly, reported concerns with pt's overall health and recent decline in functional status. Per Roly, pt is having more nausea, fatigue, and weakness over the last 2-3 days. Pt is taking antiemetics as needed without much relief. Roly stated that pt's appetite is minimal and she's having labile blood sugars. Pt is taking lactulose at least twice daily and is averaging 3 BMs/day. Roly denied noticing confusion but does feel that pt has been more lethargic.     Reviewed pt's medication list and asked whether pt has been taking recently prescribed ativan and flexeril. Per Roly, pt took ativan this morning for a panic attack. Educated Roly that flexeril and ativan can both cause somnolence and dizziness. Advised pt to refrain from taking either medication unless having a severe panic attack and/or muscle spasms. Instructed pt to take an additional dose of lactulose if fatigue/lethargy do not improve without use of flexeril and ativan.     Encouraged pt and Roly to reach out to pt's endocrine team regarding labile blood sugars. Will check in with pt in 1-2 days. Advised Roly to bring pt to the ER for further evaluation if symptoms worsen.

## 2022-02-03 DIAGNOSIS — K75.81 LIVER CIRRHOSIS SECONDARY TO NASH (H): ICD-10-CM

## 2022-02-03 DIAGNOSIS — E55.9 VITAMIN D DEFICIENCY: ICD-10-CM

## 2022-02-03 DIAGNOSIS — K74.60 LIVER CIRRHOSIS SECONDARY TO NASH (H): ICD-10-CM

## 2022-02-03 RX ORDER — ERGOCALCIFEROL 1.25 MG/1
CAPSULE, LIQUID FILLED ORAL
Qty: 8 CAPSULE | Refills: 0 | OUTPATIENT
Start: 2022-02-03

## 2022-02-03 NOTE — PROGRESS NOTES
Pt reported feeling less fatigued than yesterday. Educated pt on the side effects of ativan and that this medication likely contributed to pt's lethargy. Encouraged pt to take ativan only as needed and that pt should reach out to primary care team and mental health team regarding management of pt's anxiety. Pt stated that she does not have an appetite but is tolerating oral intake. Pt reported compliance with all medications. Pt is titrating lactulose to achieve 3-5 BMs/day.     Discussed pt's trip to [place] and emphasized that pt and spouse, Roly, need to continue to discuss whether the benefits to pt's mental health outweigh the potential risks. Pt verbalized understanding and plans to discuss this further with Roly.

## 2022-02-05 DIAGNOSIS — K75.81 LIVER CIRRHOSIS SECONDARY TO NASH (H): ICD-10-CM

## 2022-02-05 DIAGNOSIS — K74.60 LIVER CIRRHOSIS SECONDARY TO NASH (H): ICD-10-CM

## 2022-02-07 RX ORDER — SPIRONOLACTONE 50 MG/1
TABLET, FILM COATED ORAL
Qty: 30 TABLET | Refills: 3 | OUTPATIENT
Start: 2022-02-07

## 2022-02-07 RX ORDER — FUROSEMIDE 40 MG
TABLET ORAL
Qty: 30 TABLET | Refills: 3 | OUTPATIENT
Start: 2022-02-07

## 2022-02-09 ENCOUNTER — PATIENT OUTREACH (OUTPATIENT)
Dept: GASTROENTEROLOGY | Facility: CLINIC | Age: 52
End: 2022-02-09
Payer: COMMERCIAL

## 2022-02-09 NOTE — PROGRESS NOTES
Pt presented to the ER on  2/8 for further evaluation of left flank pain. CT of abdomen and pelvis performed and showed no acute findings in the abdomen or pelvis and no hematoma or active bleeding. Labs drawn and results at baseline. Pt discharged to home.    Pt reported that she is still having a lot of pain at left flank. Pt is taking prn oxycodone as needed. Pt plans to call PCP and see if she can be prescribed anything additional for pain such as lidocaine patch. Encouraged pt to continue taking Tylenol (not to exceed 2 grams in 24 hours) and alternate heat/ice packs.     Pt reported compliance with lactulose and is having 4-5 small BMs/day. Denied experiencing confusion. Reviewed that CT on 2/8 also showed extensive stool burden in colon and pt could also try miralax for constipation.    Pt and spouse, Roly, plan to leave for [place] on 2/14 and will return in approximately 2 weeks. Emphasized the importance of pt bringing and taking all medications as prescribed while on vacation. Instructed pt to bring extra lactulose with and continue to titrate to achieve 3-5 stools and to confusion.     Will check in with pt prior to leaving for [place]. Pt verbalized understanding and is agreeable to plan of care.

## 2022-02-12 NOTE — PROGRESS NOTES
Pt stated that she had an appointment with PCP today and had a panic attack during the appointment. Per pt, PCP does not think she should travel to [place] in current state of health. Pt reported that PCP thinks she should apply for additional assistance at home such as palliative home care. PCP also made adjustments to pt's antidepressants. Pt stated that she has no quality of life and does not want to live like this. Pt denied suicidal ideation.     Provided emotional support and discussed the importance of optimizing pt's mental health. Pt  Stated that psychiatry at [facility] has 2-3 month waiting period for appointments. Pt has an appointment with Sancho Gaines on 2/14 and writer encouraged pt to discuss psychiatry resources with him. Advised pt to reach out to support system and to continue to attend transplant support group.     Plan to check in with pt in 1 week. Pt verbalized understanding and is agreeable to plan of care.

## 2022-02-14 ENCOUNTER — HOSPITAL ENCOUNTER (INPATIENT)
Facility: CLINIC | Age: 52
LOS: 6 days | Discharge: HOME HEALTH | DRG: 441 | End: 2022-02-21
Attending: EMERGENCY MEDICINE | Admitting: STUDENT IN AN ORGANIZED HEALTH CARE EDUCATION/TRAINING PROGRAM
Payer: COMMERCIAL

## 2022-02-14 DIAGNOSIS — E83.42 HYPOMAGNESEMIA: ICD-10-CM

## 2022-02-14 DIAGNOSIS — Z79.4 ENCOUNTER FOR LONG-TERM (CURRENT) USE OF INSULIN (H): ICD-10-CM

## 2022-02-14 DIAGNOSIS — K74.60 LIVER CIRRHOSIS SECONDARY TO NASH (H): ICD-10-CM

## 2022-02-14 DIAGNOSIS — K74.60 NON-ALCOHOLIC CIRRHOSIS (H): ICD-10-CM

## 2022-02-14 DIAGNOSIS — Z11.52 ENCOUNTER FOR SCREENING LABORATORY TESTING FOR SEVERE ACUTE RESPIRATORY SYNDROME CORONAVIRUS 2 (SARS-COV-2): ICD-10-CM

## 2022-02-14 DIAGNOSIS — E78.5 HYPERLIPIDEMIA, UNSPECIFIED HYPERLIPIDEMIA TYPE: ICD-10-CM

## 2022-02-14 DIAGNOSIS — R94.31 PROLONGED Q-T INTERVAL ON ECG: ICD-10-CM

## 2022-02-14 DIAGNOSIS — K75.81 LIVER CIRRHOSIS SECONDARY TO NASH (H): ICD-10-CM

## 2022-02-14 DIAGNOSIS — E11.9 TYPE 2 DIABETES MELLITUS WITHOUT COMPLICATION, WITHOUT LONG-TERM CURRENT USE OF INSULIN (H): ICD-10-CM

## 2022-02-14 DIAGNOSIS — D64.9 ANEMIA, UNSPECIFIED TYPE: ICD-10-CM

## 2022-02-14 DIAGNOSIS — F41.9 ANXIETY: ICD-10-CM

## 2022-02-14 DIAGNOSIS — E87.6 HYPOKALEMIA: ICD-10-CM

## 2022-02-14 DIAGNOSIS — G57.93 NEUROPATHIC PAIN OF BOTH LEGS: ICD-10-CM

## 2022-02-14 DIAGNOSIS — E03.9 ACQUIRED HYPOTHYROIDISM: ICD-10-CM

## 2022-02-14 DIAGNOSIS — Z98.84 HISTORY OF GASTRIC BYPASS: Primary | ICD-10-CM

## 2022-02-14 DIAGNOSIS — M62.81 GENERALIZED MUSCLE WEAKNESS: ICD-10-CM

## 2022-02-14 PROCEDURE — 99285 EMERGENCY DEPT VISIT HI MDM: CPT | Mod: 25 | Performed by: EMERGENCY MEDICINE

## 2022-02-14 NOTE — PROGRESS NOTES
INTERVENTIONAL RADIOLOGY FOLLOW-UP NOTE     Name: Susan Puckett  Age: 51 year old   Referring Physician: Dr. Cook   REASON FOR VISIT: Status post TIPS    HPI: Ms. 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  Her ascites and hepatic hydrothorax has been very well controlled since placement of the TIPS.  However she has had episodes of hepatic encephalopathy, for which she was hospitalized (twice) lately.  I was consulted regarding TIPS reduction procedure during her most recent hospitalization, which after discussion with hepatology, we decided to defer this to a more aggressive medical management of the encephalopathy.  Since her last discharge before the New Year's (12/28/2021) she has been very compliant with her lactulose and rifaximin, with 3-4 bowel movements per day.  Her thinking has remained clear, which is also confirmed by her spouse Rivera.  However she shares with me that she does not feel great, as her appetite remains poor and she continues to feel fatigued.  She reports a slight increase in her abdominal girth, but nowhere close to her and she had ascites.  She believes she has gained some weight.  No lower extremity swelling.  No abdominal pain, itching or skin changes.  No episodes of GI bleeding.  She denies fevers, chills, cough or shortness of breath.    PAST MEDICAL HISTORY:   Past Medical History:   Diagnosis Date     Anemia      Anxiety      Cold sore      Depressive disorder      Diabetes (H)     Type 2 DM/No Insulin      Encephalopathy      Esophageal varices without bleeding (H)     grade I     History of blood transfusion     10/2019     History of seizure     diabetic seizure     Insomnia      Liver cirrhosis secondary to CUETO (H) 05/28/2020     Migraine       Pleural effusion      Thrombocytopenia (H)      Thyroid disease        PAST SURGICAL HISTORY:   Past Surgical History:   Procedure Laterality Date     APPENDECTOMY      1989     COLONOSCOPY      1/2020 at [facility]      ENT SURGERY  1998    tempanoplasty     GI SURGERY      EGD August 2020 at [facility]      GYN SURGERY  2010    laparoscopic ablation     IR TRANSVEN INTRAHEPATIC PORTOSYST SHUNT  11/5/2021     MAMMOPLASTY REDUCTION BILATERAL  2004     TN STAB PHELBECTOMY VARICOSE VEINS, LESS THAN 10 INCISIONS, ONE EXTREMITY  2020     RNY gastric bypass  01/2006    retoocolic, retrogastric, [facility]     TONSILLECTOMY & ADENOIDECTOMY Bilateral 1978     WISDOM TEETH EXTRACTION         FAMILY HISTORY:   Family History   Problem Relation Age of Onset     Anesthesia Reaction Nephew         PONV     Bleeding Disorder No family hx of      Clotting Disorder No family hx of        SOCIAL HISTORY:   Social History     Tobacco Use     Smoking status: Never Smoker     Smokeless tobacco: Never Used   Substance Use Topics     Alcohol use: Not Currently     Comment: Last drink was in 2017       PROBLEM LIST:   Patient Active Problem List    Diagnosis Date Noted     Altered mental status, unspecified altered mental status type 12/26/2021     Priority: Medium     Decompensated hepatic cirrhosis (H) 12/06/2021     Priority: Medium     Abdominal pain, epigastric 11/06/2021     Priority: Medium     Hyperglycemia 11/06/2021     Priority: Medium     Cirrhosis of liver without ascites, unspecified hepatic cirrhosis type (H) 11/06/2021     Priority: Medium     Exposure to COVID-19 virus 11/22/2020     Priority: Medium     CUETO (nonalcoholic steatohepatitis) 10/25/2020     Priority: Medium     Acute kidney injury (nontraumatic) (H) 09/09/2020     Priority: Medium     Hepatic encephalopathy (H) 09/09/2020     Priority: Medium     Lactate blood increase 09/09/2020     Priority: Medium     Moderate dehydration 09/09/2020     Priority:  Medium     Other headache syndrome 08/23/2020     Priority: Medium     Nausea 08/23/2020     Priority: Medium     Chronic peritoneal effusion 08/11/2020     Priority: Medium     Pleural effusion associated with hepatic disorder 08/11/2020     Priority: Medium     Hydrothorax 07/29/2020     Priority: Medium     Pancytopenia (H) 07/29/2020     Priority: Medium     Brow ptosis, bilateral 07/22/2020     Priority: Medium     Ptosis of eyelid 07/22/2020     Priority: Medium     Added automatically from request for surgery [id number]  Added automatically from request for surgery [id number]       Visual field defect 07/22/2020     Priority: Medium     Added automatically from request for surgery [id number]       Non-alcoholic cirrhosis (H) 07/09/2020     Priority: Medium     Ascites 06/04/2020     Priority: Medium     Chronic diarrhea 05/28/2020     Priority: Medium     Endometriosis 05/28/2020     Priority: Medium     Liver cirrhosis secondary to CUETO (H) 05/28/2020     Priority: Medium     History of gastric ulcer 05/28/2020     Priority: Medium     With acute hemorrhage, 2019       Benign tumor of colon 01/22/2020     Priority: Medium     Tubular adenoma of colon 01/22/2020     Priority: Medium     Colitis 10/13/2019     Priority: Medium     Anxiety 12/16/2018     Priority: Medium     Insomnia 12/17/2015     Priority: Medium     Infertility management 11/05/2015     Priority: Medium     IVF through CRM       Vitamin D deficiency 10/27/2015     Priority: Medium     History of gastric bypass 10/24/2014     Priority: Medium     HSV-1 (herpes simplex virus 1) infection 10/24/2014     Priority: Medium     Allergic rhinitis 10/24/2014     Priority: Medium     fall allergies, seem to be improving, no meds currently       Type 2 diabetes mellitus without complication, without long-term current use of insulin (H) 10/24/2014     Priority: Medium     H/O gastric bypass 10/24/2014     Priority: Medium     Raynaud phenomenon 12/27/2013      Priority: Medium     fingers/toes numb if cold       Iron deficiency anemia 2013     Priority: Medium     Major depressive disorder, recurrent episode, moderate (H) 2009     Priority: Medium     fluoxetine       Hypothyroidism 2003     Priority: Medium     Primary hypothyroidism 2003     Priority: Medium     Hypothyroidism Acquired         MEDICATIONS:   Prescription Medications as of 2022       Rx Number Disp Refills Start End Last Dispensed Date Next Fill Date Owning Pharmacy    acetaminophen (TYLENOL) 500 MG tablet    2021        Sig: Take 1 tablet (500 mg) by mouth every 6 hours as needed for mild pain    Class: No Print Out    Route: Oral    acetone urine (KETOSTIX) test strip    2021       Si strip    Class: Historical    albuterol (PROAIR HFA/PROVENTIL HFA/VENTOLIN HFA) 108 (90 Base) MCG/ACT inhaler    10/29/2021        Sig: Inhale 1-2 puffs into the lungs     Class: Historical    Route: Inhalation    amylase-lipase-protease (CREON 6) [id number] units CPEP        [id number] [place] [address]    Sig: Take 3 capsules by mouth 3 times daily (with meals)    Class: Historical    Route: Oral    atorvastatin (LIPITOR) 20 MG tablet    2020    [facility] [place] [address]    Sig: Take 20 mg by mouth every morning     Class: Historical    Route: Oral    calcium carbonate (TUMS) 500 MG chewable tablet        [facility] [place] [address]    Sig: Take 1 tablet by mouth daily as needed for heartburn     Class: Historical    Route: Oral    calcium citrate-vitamin D (CITRACAL W/D) 250-100 MG-UNIT tablet  336 tablet 3 10/21/2020    [facility] [place] [address]    Sig: Take 2 tablets by mouth 2 times daily (with meals)    Class: E-Prescribe    Route: Oral    Continuous Blood Gluc  (DEXCOM G6 ) LUNA    2021         Sig: Use as directed for continuous glucose monitoring.    Class: Historical    Continuous Blood Gluc Sensor (DEXCOM G6 SENSOR) MISC    6/1/2021        Sig: Use as directed for continuous glucose monitoring.  Change sensor every 10 days.    Class: Historical    Continuous Blood Gluc Transmit (DEXCOM G6 TRANSMITTER) MISC    6/1/2021        Sig: Use as directed for continuous glucose monitoring.  Change every 3 months.    Class: Historical    cyanocobalamin (VITAMIN B-12) 1000 MCG tablet    9/2/2020    [facility] [place] [address]    Sig: Take 1,000 mcg by mouth every 7 days     Class: Historical    Route: Oral    Ferrous Sulfate (IRON) 325 (65 FE) MG tablet            Sig: Take 1 tablet by mouth every morning     Class: Historical    Route: Oral    folic acid (FOLVITE) 1 MG tablet    2/26/2020    [facility] [place] [address]    Sig: Take 1 mg by mouth every morning     Class: Historical    Route: Oral    furosemide (LASIX) 40 MG tablet  30 tablet 3 1/14/2022    [facility] [place] [address]    Sig: Take 1 tablet (40 mg) by mouth daily    Class: E-Prescribe    Route: Oral    hydrOXYzine (ATARAX) 25 MG tablet        [facility] [place] [address]    Sig: Take 25 mg by mouth 3 times daily as needed for anxiety    Class: Historical    Route: Oral    insulin aspart (NOVOLOG FLEXPEN) 100 UNIT/ML pen    4/26/2021        Sig: Inject 1u/50>200 every 4 hours as needed for high blood glucose. Up to 20 units daily.  Indications: Diabetes Mellitus    Class: Historical    insulin glargine (LANTUS PEN) 100 UNIT/ML pen  15 mL 0 12/28/2021    [facility] - [place], [place] - [address]    Sig: Inject 8 Units Subcutaneous every morning Takes around 12noon    Class: No Print Out    Notes to Pharmacy: If Lantus is not covered by insurance, may substitute Basaglar at same dose and  frequency.      Route: Subcutaneous    insulin pen needle (BD GRISEL U/F) 32G X 4 MM miscellaneous    6/1/2021        Sig: Inject 1 each Subcutaneous    Class: Historical    Route: Subcutaneous    lactulose (CHRONULAC) 10 GM/15ML solution  3784 mL 5 7/9/2021    [facility] [place] [address]    Sig: TAKE 30 MLS BY MOUTH 3 TIMES DAILY    Class: E-Prescribe    levothyroxine (SYNTHROID/LEVOTHROID) 200 MCG tablet    6/4/2020 11/2/2022       Sig: Take 200 mcg by mouth    Class: Historical    Route: Oral    LORazepam (ATIVAN) 0.5 MG tablet  5 tablet 0 1/27/2022        Sig: Take 1 tablet (0.5 mg) by mouth every 6 hours as needed for anxiety    Class: Local Print    Route: Oral    metoclopramide (REGLAN) 10 MG tablet    8/20/2021        Sig: Take 10 mg by mouth    Class: Historical    Route: Oral    Multiple Vitamin (MULTIVITAMIN PO)        [facility] [place] [address]    Sig: Take 2 tablets by mouth every morning     Class: Historical    Route: Oral    multivitamin CF formula (AQUADEKS) chewable tablet  90 tablet 3 1/19/2022    [facility] [place] [address]    Sig: Take 1 tablet by mouth daily    Class: E-Prescribe    Route: Oral    omeprazole (PRILOSEC) 20 MG DR capsule  30 capsule 0 11/25/2020    [facility] [place] [address]    Sig: Take 1 capsule (20 mg) by mouth daily    Class: E-Prescribe    Route: Oral    ondansetron (ZOFRAN-ODT) 4 MG ODT tab  120 tablet 1 11/8/2021    [facility] [place] [address]    Sig: Place 1 tablet (4 mg) under the tongue every 6 hours as needed for nausea    Class: E-Prescribe    Route: Sublingual    Renewals     Renewal requests to authorizing provider (Sis Yepez) <b>prohibited</b>          potassium chloride ER (K-TAB) 20 MEQ CR tablet  120 tablet 0 1/21/2022    [facility] [place] [address]     Sig: Take 2 tablets (40 mEq) by mouth 2 times daily    Class: E-Prescribe    Route: Oral    rifampin (RIFADIN) 300 MG capsule  90 capsule 3 8/26/2021    [facility] [place] [address]    Sig: TAKE 1 CAPSULE BY MOUTH EVERY DAY    Class: E-Prescribe    rifaximin (XIFAXAN) 550 MG TABS tablet  60 tablet 11 3/4/2021    [facility] [place] [address]    Sig: Take 1 tablet (550 mg) by mouth 2 times daily    Class: E-Prescribe    Notes to Pharmacy: Can give a 3 month supply if easier and/or cheaper for patient.    Route: Oral    Rimegepant Sulfate 75 MG TBDP    10/28/2021        Sig: Take 75 mg by mouth    Class: Historical    Route: Oral    simethicone (MYLICON) 80 MG chewable tablet        [facility] [place] [address]    Sig: Take 80 mg by mouth every 6 hours as needed for flatulence or cramping    Class: Historical    Route: Oral    sitagliptin (JANUVIA) 100 MG tablet     11/26/2020    [facility] [place] [address]    Sig: Take 100 mg by mouth every morning     Class: Historical    Route: Oral    spironolactone (ALDACTONE) 50 MG tablet  30 tablet 3 1/14/2022    [facility] [place] [address]    Sig: Take 1 tablet (50 mg) by mouth daily    Class: E-Prescribe    Route: Oral    SUMAtriptan (IMITREX) 50 MG tablet    8/28/2020    [facility] [place] [address]    Sig: Take 50 mg by mouth at onset of headache for migraine     Class: Historical    Route: Oral    topiramate (TOPAMAX) 50 MG tablet    10/27/2021        Sig: Take 50 mg by mouth daily     Class: Historical    Route: Oral    traZODone (DESYREL) 100 MG tablet    10/5/2020    [facility] [place] [address]    Sig: Take  mg by mouth nightly as needed for sleep    Class: Historical    Route: Oral    valACYclovir (VALTREX) 1000 mg tablet    3/25/2020     [facility] [place] [address]    Sig: Take 1,000 mg by mouth daily as needed (at onset of cold sore)     Class: Historical    Route: Oral    venlafaxine (EFFEXOR) 75 MG tablet  30 tablet 0 11/5/2020    [facility] [place] [address]    Sig: Take 1 tablet (75 mg) by mouth At Bedtime    Class: E-Prescribe    Notes to Pharmacy: Renewal requests go to Brooke Billy with [facility].    Route: Oral    Renewals     Renewal requests to authorizing provider (Ozzie Augustin MD) <b>prohibited</b>          vitamin A 3 MG (16434 UNITS) capsule    12/5/2019    [facility] [place] [address]    Sig: Take 10,000 Units by mouth every morning     Class: Historical    Route: Oral    vitamin C (ASCORBIC ACID) 1000 MG TABS    10/20/2021        Class: Historical    vitamin D2 (ERGOCALCIFEROL) 33766 units (1250 mcg) capsule  8 capsule 0 1/11/2022    [facility] [place] [address]    Sig: Take 1 capsule (50,000 Units) by mouth once a week    Class: E-Prescribe    Route: Oral    vitamin D3 (CHOLECALCIFEROL) 50 mcg (2000 units) tablet  90 tablet 3 10/22/2021    [facility] [place] [address]    Sig: Take 1 tablet (50 mcg) by mouth daily    Class: E-Prescribe    Route: Oral    vitamin E (TOCOPHEROL) 400 units (180 mg) capsule    9/2/2020    [facility] [place] [address]    Sig: Take 400 Units by mouth every morning     Class: Historical    Route: Oral          ALLERGIES:   Methylprednisolone, Droperidol, Nsaids, and Prednisone    ROS:  Answers for HPI/ROS submitted by the patient on 1/10/2022  General Symptoms: Yes  Skin Symptoms: No  HENT Symptoms: No  EYE SYMPTOMS: No  HEART SYMPTOMS: No  LUNG SYMPTOMS: No  INTESTINAL SYMPTOMS: No  URINARY SYMPTOMS: No  GYNECOLOGIC SYMPTOMS: No  BREAST SYMPTOMS: No  SKELETAL SYMPTOMS: No  BLOOD SYMPTOMS:  Yes  NERVOUS SYSTEM SYMPTOMS: No  MENTAL HEALTH SYMPTOMS: Yes  Fever: No  Loss of appetite: Yes  Weight loss: No  Weight gain: Yes  Fatigue: Yes  Night sweats: No  Chills: No  Increased stress: Yes  Excessive hunger: No  Excessive thirst: No  Feeling hot or cold when others believe the temperature is normal: No  Loss of height: No  Post-operative complications: No  Surgical site pain: No  Hallucinations: No  Change in or Loss of Energy: Yes  Hyperactivity: No  Confusion: Yes  Anemia: No  Swollen glands: No  Easy bleeding or bruising: Yes  Edema or swelling: Yes  Nervous or Anxious: Yes  Depression: Yes  Trouble sleeping: Yes  Trouble thinking or concentrating: No  Mood changes: No  Panic attacks: No      Physical Examination:   VITALS:   BP 97/67 (BP Location: Left arm, Patient Position: Sitting, Cuff Size: Adult Regular)   Pulse 70   SpO2 100%   General:  Pt sitting in chair comfortably.  No acute distress  HEENT: normocephalic.  Neck: no JVD  Resp: nonlabored.  CTAb  CV: RRR.  S1 & S2.  No rub  Abd: Nondistended (in my assessment) nontender, soft, normoactive bowel sounds  Lymph: no pedal edema  Neuro: Oriented x3.  No evidence of focal motor deficits  Mood: appropriate affect      Labs:    BMP RESULTS:  Lab Results   Component Value Date     01/26/2022     07/05/2021    POTASSIUM 3.1 (L) 01/26/2022    POTASSIUM 3.0 (L) 07/05/2021    CHLORIDE 108 01/26/2022    CHLORIDE 107 07/05/2021    CO2 27 01/26/2022    CO2 29 07/05/2021    ANIONGAP 5 01/26/2022    ANIONGAP 5 07/05/2021     (H) 01/26/2022     (H) 07/05/2021    BUN 3 (L) 01/26/2022    BUN 11 07/05/2021    CR 1.04 01/26/2022    CR 1.10 (H) 07/05/2021    GFRESTIMATED 66 01/26/2022    GFRESTIMATED 59 (L) 07/05/2021    GFRESTBLACK 68 07/05/2021    MAURI 7.9 (L) 01/26/2022    MAURI 7.9 (L) 07/05/2021        CBC RESULTS:  Lab Results   Component Value Date    WBC 3.6 (L) 01/26/2022    WBC 4.0 07/05/2021    RBC 3.00 (L) 01/26/2022    RBC 3.55  (L) 07/05/2021    HGB 10.0 (L) 01/26/2022    HGB 11.5 (L) 07/05/2021    HCT 30.0 (L) 01/26/2022    HCT 34.4 (L) 07/05/2021     01/26/2022    MCV 97 07/05/2021    MCH 33.3 (H) 01/26/2022    MCH 32.4 07/05/2021    MCHC 33.3 01/26/2022    MCHC 33.4 07/05/2021    RDW 17.7 (H) 01/26/2022    RDW 14.7 07/05/2021    PLT 75 (L) 01/26/2022     (L) 07/05/2021       INR/PTT:  Lab Results   Component Value Date    INR 1.85 (H) 01/26/2022    INR 1.18 (H) 07/05/2021    PTT 39 (H) 01/26/2022    PTT 32 11/22/2020       Diagnostic studies: TIPS ultrasound dated 12/27/2021 (I personally reviewed and interpreted the imaging) reveals patent TIPS with slight decreased velocities in the portal venous end of the shunt.  No ascites.    MELD-Na score: 15 at 1/26/2022 11:39 PM  MELD score: 15 at 1/26/2022 11:39 PM  Calculated from:  Serum Creatinine: 1.04 mg/dL at 1/26/2022 10:40 PM  Serum Sodium: 140 mmol/L (Using max of 137 mmol/L) at 1/26/2022 10:40 PM  Total Bilirubin: 1.6 mg/dL at 1/26/2022 10:40 PM  INR(ratio): 1.85 at 1/26/2022 11:39 PM  Age: 51 years      Assessment 51-year-old female with refractory ascites and hepatic hydrothorax, status post TIPS on 11/5/2021, with resolution of the ascites and hepatic hydrothorax and episodes of hepatic encephalopathy.  At this point encephalopathy seems adequately controlled with more compliant use of lactulose.  No other complications of the TIPS.  The shunt is widely patent in my assessment clinically and imaging wise and given the response to medication for control of encephalopathy, I would not advise for shunt reduction.  Plan  -Return to clinic in 6 months with TIPS ultrasound and hepatic panel.    Thank you for allowing me to participate in the care of of Mrs. Puckett today..    I, Eddie Storey, was present for the entirety of this visit. I have documented the findings as well as the assessment and plan.      Eddie Storey MD    I spent a total of 30 minutes face-to-face with  Susan Puckett during today's office visit. Over 50% of this time was spent counseling the patient and/or coordinating care. See note for details.     An additional 15 minutes spent on the date of the encounter doing chart review, documentation and further activities as noted above        CC  Patient Care Team:  Harleen Billy PA-C as PCP - General  Rivera Dowling MD as MD (Gastroenterology)  Raphael Cook MD as MD (Gastroenterology)  Eli Tobar, RN as Specialty Care Coordinator  Raphael Cook MD as Assigned Surgical Provider  Steffany Yun PA-C as Physician Assistant (Anesthesiology)  Julia Munoz RN as Nurse Coordinator (Transplant)  Sancho Gaines as Assigned Behavioral Health Provider  ADDY KATE

## 2022-02-15 ENCOUNTER — APPOINTMENT (OUTPATIENT)
Dept: ULTRASOUND IMAGING | Facility: CLINIC | Age: 52
DRG: 441 | End: 2022-02-15
Attending: PHYSICIAN ASSISTANT
Payer: COMMERCIAL

## 2022-02-15 ENCOUNTER — APPOINTMENT (OUTPATIENT)
Dept: CT IMAGING | Facility: CLINIC | Age: 52
DRG: 441 | End: 2022-02-15
Payer: COMMERCIAL

## 2022-02-15 PROBLEM — R94.31 PROLONGED Q-T INTERVAL ON ECG: Status: ACTIVE | Noted: 2022-02-15

## 2022-02-15 PROBLEM — E87.6 HYPOKALEMIA: Status: ACTIVE | Noted: 2022-02-15

## 2022-02-15 LAB
ALBUMIN SERPL-MCNC: 1.8 G/DL (ref 3.4–5)
ALBUMIN UR-MCNC: NEGATIVE MG/DL
ALP SERPL-CCNC: 161 U/L (ref 40–150)
ALT SERPL W P-5'-P-CCNC: 27 U/L (ref 0–50)
AMMONIA PLAS-SCNC: 47 UMOL/L (ref 10–50)
AMORPH CRY #/AREA URNS HPF: ABNORMAL /HPF
ANION GAP SERPL CALCULATED.3IONS-SCNC: 6 MMOL/L (ref 3–14)
ANION GAP SERPL CALCULATED.3IONS-SCNC: 8 MMOL/L (ref 3–14)
APPEARANCE UR: ABNORMAL
AST SERPL W P-5'-P-CCNC: 58 U/L (ref 0–45)
ATRIAL RATE - MUSE: 71 BPM
BASOPHILS # BLD AUTO: 0 10E3/UL (ref 0–0.2)
BASOPHILS NFR BLD AUTO: 1 %
BILIRUB SERPL-MCNC: 1.8 MG/DL (ref 0.2–1.3)
BILIRUB UR QL STRIP: NEGATIVE
BUN SERPL-MCNC: 6 MG/DL (ref 7–30)
BUN SERPL-MCNC: 6 MG/DL (ref 7–30)
CALCIUM SERPL-MCNC: 7.9 MG/DL (ref 8.5–10.1)
CALCIUM SERPL-MCNC: 8.1 MG/DL (ref 8.5–10.1)
CHLORIDE BLD-SCNC: 102 MMOL/L (ref 94–109)
CHLORIDE BLD-SCNC: 106 MMOL/L (ref 94–109)
CO2 SERPL-SCNC: 25 MMOL/L (ref 20–32)
CO2 SERPL-SCNC: 30 MMOL/L (ref 20–32)
COLOR UR AUTO: ABNORMAL
CREAT SERPL-MCNC: 0.99 MG/DL (ref 0.52–1.04)
CREAT SERPL-MCNC: 1.09 MG/DL (ref 0.52–1.04)
DIASTOLIC BLOOD PRESSURE - MUSE: NORMAL MMHG
EOSINOPHIL # BLD AUTO: 0.2 10E3/UL (ref 0–0.7)
EOSINOPHIL NFR BLD AUTO: 6 %
ERYTHROCYTE [DISTWIDTH] IN BLOOD BY AUTOMATED COUNT: 17.3 % (ref 10–15)
GFR SERPL CREATININE-BSD FRML MDRD: 62 ML/MIN/1.73M2
GFR SERPL CREATININE-BSD FRML MDRD: 70 ML/MIN/1.73M2
GLUCOSE BLD-MCNC: 142 MG/DL (ref 70–99)
GLUCOSE BLD-MCNC: 146 MG/DL (ref 70–99)
GLUCOSE BLDC GLUCOMTR-MCNC: 118 MG/DL (ref 70–99)
GLUCOSE BLDC GLUCOMTR-MCNC: 118 MG/DL (ref 70–99)
GLUCOSE BLDC GLUCOMTR-MCNC: 189 MG/DL (ref 70–99)
GLUCOSE BLDC GLUCOMTR-MCNC: 218 MG/DL (ref 70–99)
GLUCOSE BLDC GLUCOMTR-MCNC: 227 MG/DL (ref 70–99)
GLUCOSE UR STRIP-MCNC: NEGATIVE MG/DL
HCT VFR BLD AUTO: 25.6 % (ref 35–47)
HGB BLD-MCNC: 8.2 G/DL (ref 11.7–15.7)
HGB UR QL STRIP: NEGATIVE
HOLD SPECIMEN: NORMAL
HOLD SPECIMEN: NORMAL
IMM GRANULOCYTES # BLD: 0 10E3/UL
IMM GRANULOCYTES NFR BLD: 0 %
INR PPP: 1.9 (ref 0.85–1.15)
INTERPRETATION ECG - MUSE: NORMAL
KETONES UR STRIP-MCNC: NEGATIVE MG/DL
LACTATE SERPL-SCNC: 1.5 MMOL/L (ref 0.7–2)
LEUKOCYTE ESTERASE UR QL STRIP: NEGATIVE
LYMPHOCYTES # BLD AUTO: 0.8 10E3/UL (ref 0.8–5.3)
LYMPHOCYTES NFR BLD AUTO: 32 %
MAGNESIUM SERPL-MCNC: 1.6 MG/DL (ref 1.8–2.6)
MCH RBC QN AUTO: 33.7 PG (ref 26.5–33)
MCHC RBC AUTO-ENTMCNC: 32 G/DL (ref 31.5–36.5)
MCV RBC AUTO: 105 FL (ref 78–100)
MONOCYTES # BLD AUTO: 0.3 10E3/UL (ref 0–1.3)
MONOCYTES NFR BLD AUTO: 12 %
NEUTROPHILS # BLD AUTO: 1.4 10E3/UL (ref 1.6–8.3)
NEUTROPHILS NFR BLD AUTO: 49 %
NITRATE UR QL: NEGATIVE
NRBC # BLD AUTO: 0 10E3/UL
NRBC BLD AUTO-RTO: 0 /100
NT-PROBNP SERPL-MCNC: 653 PG/ML (ref 0–450)
P AXIS - MUSE: NORMAL DEGREES
PH UR STRIP: 8 [PH] (ref 5–7)
PLATELET # BLD AUTO: 97 10E3/UL (ref 150–450)
POTASSIUM BLD-SCNC: 2.9 MMOL/L (ref 3.4–5.3)
POTASSIUM BLD-SCNC: 4.5 MMOL/L (ref 3.4–5.3)
PR INTERVAL - MUSE: 120 MS
PROCALCITONIN SERPL-MCNC: 0.21 NG/ML
PROT SERPL-MCNC: 5.5 G/DL (ref 6.8–8.8)
QRS DURATION - MUSE: 84 MS
QT - MUSE: 502 MS
QTC - MUSE: 545 MS
R AXIS - MUSE: -15 DEGREES
RBC # BLD AUTO: 2.43 10E6/UL (ref 3.8–5.2)
RBC URINE: 0 /HPF
SARS-COV-2 RNA RESP QL NAA+PROBE: NEGATIVE
SODIUM SERPL-SCNC: 137 MMOL/L (ref 133–144)
SODIUM SERPL-SCNC: 140 MMOL/L (ref 133–144)
SP GR UR STRIP: 1.01 (ref 1–1.03)
SQUAMOUS EPITHELIAL: 1 /HPF
SYSTOLIC BLOOD PRESSURE - MUSE: NORMAL MMHG
T AXIS - MUSE: 0 DEGREES
T4 FREE SERPL-MCNC: 2.26 NG/DL (ref 0.76–1.46)
TROPONIN I SERPL HS-MCNC: 7 NG/L
TSH SERPL DL<=0.005 MIU/L-ACNC: 0.06 MU/L (ref 0.4–4)
UROBILINOGEN UR STRIP-MCNC: NORMAL MG/DL
VENTRICULAR RATE- MUSE: 71 BPM
WBC # BLD AUTO: 2.7 10E3/UL (ref 4–11)
WBC URINE: 0 /HPF

## 2022-02-15 PROCEDURE — 250N000013 HC RX MED GY IP 250 OP 250 PS 637: Performed by: EMERGENCY MEDICINE

## 2022-02-15 PROCEDURE — 85025 COMPLETE CBC W/AUTO DIFF WBC: CPT | Performed by: EMERGENCY MEDICINE

## 2022-02-15 PROCEDURE — C9803 HOPD COVID-19 SPEC COLLECT: HCPCS | Performed by: EMERGENCY MEDICINE

## 2022-02-15 PROCEDURE — 83735 ASSAY OF MAGNESIUM: CPT | Performed by: EMERGENCY MEDICINE

## 2022-02-15 PROCEDURE — 85610 PROTHROMBIN TIME: CPT | Performed by: EMERGENCY MEDICINE

## 2022-02-15 PROCEDURE — 250N000012 HC RX MED GY IP 250 OP 636 PS 637

## 2022-02-15 PROCEDURE — 36415 COLL VENOUS BLD VENIPUNCTURE: CPT | Performed by: EMERGENCY MEDICINE

## 2022-02-15 PROCEDURE — 83880 ASSAY OF NATRIURETIC PEPTIDE: CPT | Performed by: EMERGENCY MEDICINE

## 2022-02-15 PROCEDURE — G0378 HOSPITAL OBSERVATION PER HR: HCPCS

## 2022-02-15 PROCEDURE — 93005 ELECTROCARDIOGRAM TRACING: CPT

## 2022-02-15 PROCEDURE — 36415 COLL VENOUS BLD VENIPUNCTURE: CPT | Performed by: PHYSICIAN ASSISTANT

## 2022-02-15 PROCEDURE — 84443 ASSAY THYROID STIM HORMONE: CPT | Performed by: EMERGENCY MEDICINE

## 2022-02-15 PROCEDURE — 250N000011 HC RX IP 250 OP 636: Performed by: EMERGENCY MEDICINE

## 2022-02-15 PROCEDURE — 99255 IP/OBS CONSLTJ NEW/EST HI 80: CPT | Mod: GC | Performed by: INTERNAL MEDICINE

## 2022-02-15 PROCEDURE — 84484 ASSAY OF TROPONIN QUANT: CPT | Performed by: EMERGENCY MEDICINE

## 2022-02-15 PROCEDURE — 93005 ELECTROCARDIOGRAM TRACING: CPT | Performed by: EMERGENCY MEDICINE

## 2022-02-15 PROCEDURE — 93970 EXTREMITY STUDY: CPT | Mod: 26 | Performed by: RADIOLOGY

## 2022-02-15 PROCEDURE — 96365 THER/PROPH/DIAG IV INF INIT: CPT | Performed by: EMERGENCY MEDICINE

## 2022-02-15 PROCEDURE — 70450 CT HEAD/BRAIN W/O DYE: CPT | Mod: 26 | Performed by: RADIOLOGY

## 2022-02-15 PROCEDURE — 99220 PR INITIAL OBSERVATION CARE,LEVEL III: CPT | Mod: 25 | Performed by: NURSE PRACTITIONER

## 2022-02-15 PROCEDURE — U0005 INFEC AGEN DETEC AMPLI PROBE: HCPCS | Performed by: EMERGENCY MEDICINE

## 2022-02-15 PROCEDURE — 250N000011 HC RX IP 250 OP 636: Performed by: STUDENT IN AN ORGANIZED HEALTH CARE EDUCATION/TRAINING PROGRAM

## 2022-02-15 PROCEDURE — 76705 ECHO EXAM OF ABDOMEN: CPT | Mod: 26 | Performed by: RADIOLOGY

## 2022-02-15 PROCEDURE — 250N000013 HC RX MED GY IP 250 OP 250 PS 637: Performed by: NURSE PRACTITIONER

## 2022-02-15 PROCEDURE — 82310 ASSAY OF CALCIUM: CPT | Performed by: NURSE PRACTITIONER

## 2022-02-15 PROCEDURE — 80053 COMPREHEN METABOLIC PANEL: CPT | Performed by: EMERGENCY MEDICINE

## 2022-02-15 PROCEDURE — 120N000002 HC R&B MED SURG/OB UMMC

## 2022-02-15 PROCEDURE — 99233 SBSQ HOSP IP/OBS HIGH 50: CPT | Mod: GC | Performed by: STUDENT IN AN ORGANIZED HEALTH CARE EDUCATION/TRAINING PROGRAM

## 2022-02-15 PROCEDURE — 250N000011 HC RX IP 250 OP 636

## 2022-02-15 PROCEDURE — 250N000013 HC RX MED GY IP 250 OP 250 PS 637

## 2022-02-15 PROCEDURE — 96366 THER/PROPH/DIAG IV INF ADDON: CPT | Performed by: EMERGENCY MEDICINE

## 2022-02-15 PROCEDURE — 250N000013 HC RX MED GY IP 250 OP 250 PS 637: Performed by: STUDENT IN AN ORGANIZED HEALTH CARE EDUCATION/TRAINING PROGRAM

## 2022-02-15 PROCEDURE — 999N000248 HC STATISTIC IV INSERT WITH US BY RN

## 2022-02-15 PROCEDURE — 82140 ASSAY OF AMMONIA: CPT | Performed by: EMERGENCY MEDICINE

## 2022-02-15 PROCEDURE — 96372 THER/PROPH/DIAG INJ SC/IM: CPT

## 2022-02-15 PROCEDURE — 36415 COLL VENOUS BLD VENIPUNCTURE: CPT | Performed by: NURSE PRACTITIONER

## 2022-02-15 PROCEDURE — 76705 ECHO EXAM OF ABDOMEN: CPT

## 2022-02-15 PROCEDURE — 96367 TX/PROPH/DG ADDL SEQ IV INF: CPT | Performed by: EMERGENCY MEDICINE

## 2022-02-15 PROCEDURE — 93970 EXTREMITY STUDY: CPT

## 2022-02-15 PROCEDURE — 96375 TX/PRO/DX INJ NEW DRUG ADDON: CPT | Performed by: EMERGENCY MEDICINE

## 2022-02-15 PROCEDURE — 81001 URINALYSIS AUTO W/SCOPE: CPT

## 2022-02-15 PROCEDURE — 70450 CT HEAD/BRAIN W/O DYE: CPT

## 2022-02-15 PROCEDURE — 82962 GLUCOSE BLOOD TEST: CPT

## 2022-02-15 PROCEDURE — 84439 ASSAY OF FREE THYROXINE: CPT | Performed by: EMERGENCY MEDICINE

## 2022-02-15 PROCEDURE — 84145 PROCALCITONIN (PCT): CPT | Performed by: PHYSICIAN ASSISTANT

## 2022-02-15 PROCEDURE — 93010 ELECTROCARDIOGRAM REPORT: CPT | Performed by: EMERGENCY MEDICINE

## 2022-02-15 PROCEDURE — 83605 ASSAY OF LACTIC ACID: CPT | Performed by: PHYSICIAN ASSISTANT

## 2022-02-15 PROCEDURE — 87040 BLOOD CULTURE FOR BACTERIA: CPT | Performed by: PHYSICIAN ASSISTANT

## 2022-02-15 RX ORDER — FUROSEMIDE 10 MG/ML
40 INJECTION INTRAMUSCULAR; INTRAVENOUS ONCE
Status: COMPLETED | OUTPATIENT
Start: 2022-02-15 | End: 2022-02-15

## 2022-02-15 RX ORDER — HYDROXYZINE HYDROCHLORIDE 25 MG/1
50 TABLET, FILM COATED ORAL EVERY 6 HOURS PRN
Status: DISCONTINUED | OUTPATIENT
Start: 2022-02-15 | End: 2022-02-16

## 2022-02-15 RX ORDER — POTASSIUM CHLORIDE 7.45 MG/ML
10 INJECTION INTRAVENOUS CONTINUOUS
Status: DISCONTINUED | OUTPATIENT
Start: 2022-02-15 | End: 2022-02-15

## 2022-02-15 RX ORDER — ONDANSETRON 4 MG/1
4 TABLET, ORALLY DISINTEGRATING ORAL EVERY 6 HOURS PRN
Status: DISCONTINUED | OUTPATIENT
Start: 2022-02-15 | End: 2022-02-21 | Stop reason: HOSPADM

## 2022-02-15 RX ORDER — OXYCODONE HYDROCHLORIDE 5 MG/1
5 TABLET ORAL ONCE
Status: COMPLETED | OUTPATIENT
Start: 2022-02-15 | End: 2022-02-15

## 2022-02-15 RX ORDER — DEXTROSE MONOHYDRATE 25 G/50ML
25-50 INJECTION, SOLUTION INTRAVENOUS
Status: DISCONTINUED | OUTPATIENT
Start: 2022-02-15 | End: 2022-02-21 | Stop reason: HOSPADM

## 2022-02-15 RX ORDER — LEVOTHYROXINE SODIUM 175 UG/1
175 TABLET ORAL DAILY
COMMUNITY
End: 2022-07-10

## 2022-02-15 RX ORDER — OMEPRAZOLE 20 MG/1
20 TABLET, DELAYED RELEASE ORAL DAILY
COMMUNITY
End: 2022-07-09 | Stop reason: ALTCHOICE

## 2022-02-15 RX ORDER — VENLAFAXINE HYDROCHLORIDE 150 MG/1
150 TABLET, EXTENDED RELEASE ORAL
Status: DISCONTINUED | OUTPATIENT
Start: 2022-02-16 | End: 2022-02-16

## 2022-02-15 RX ORDER — OXYCODONE HYDROCHLORIDE 10 MG/1
10 TABLET ORAL EVERY 4 HOURS PRN
COMMUNITY
End: 2022-07-09

## 2022-02-15 RX ORDER — NALOXONE HYDROCHLORIDE 0.4 MG/ML
0.4 INJECTION, SOLUTION INTRAMUSCULAR; INTRAVENOUS; SUBCUTANEOUS
Status: DISCONTINUED | OUTPATIENT
Start: 2022-02-15 | End: 2022-02-19

## 2022-02-15 RX ORDER — TRAZODONE HYDROCHLORIDE 100 MG/1
100 TABLET ORAL
Status: DISCONTINUED | OUTPATIENT
Start: 2022-02-15 | End: 2022-02-17

## 2022-02-15 RX ORDER — LIDOCAINE 40 MG/G
CREAM TOPICAL
Status: DISCONTINUED | OUTPATIENT
Start: 2022-02-15 | End: 2022-02-21 | Stop reason: HOSPADM

## 2022-02-15 RX ORDER — PREGABALIN 25 MG/1
25 CAPSULE ORAL DAILY
Status: DISCONTINUED | OUTPATIENT
Start: 2022-02-15 | End: 2022-02-21 | Stop reason: HOSPADM

## 2022-02-15 RX ORDER — POTASSIUM CHLORIDE 20MEQ/15ML
40 LIQUID (ML) ORAL ONCE
Status: COMPLETED | OUTPATIENT
Start: 2022-02-15 | End: 2022-02-15

## 2022-02-15 RX ORDER — ATORVASTATIN CALCIUM 10 MG/1
20 TABLET, FILM COATED ORAL EVERY MORNING
Status: DISCONTINUED | OUTPATIENT
Start: 2022-02-15 | End: 2022-02-21 | Stop reason: HOSPADM

## 2022-02-15 RX ORDER — POTASSIUM CHLORIDE 750 MG/1
40 CAPSULE, EXTENDED RELEASE ORAL 2 TIMES DAILY
Status: DISCONTINUED | OUTPATIENT
Start: 2022-02-15 | End: 2022-02-21 | Stop reason: HOSPADM

## 2022-02-15 RX ORDER — LACTULOSE 10 G/15ML
30 SOLUTION ORAL 3 TIMES DAILY
Status: DISCONTINUED | OUTPATIENT
Start: 2022-02-15 | End: 2022-02-21 | Stop reason: HOSPADM

## 2022-02-15 RX ORDER — VENLAFAXINE 100 MG/1
100 TABLET ORAL 2 TIMES DAILY
Status: DISCONTINUED | OUTPATIENT
Start: 2022-02-15 | End: 2022-02-15

## 2022-02-15 RX ORDER — NALOXONE HYDROCHLORIDE 0.4 MG/ML
0.2 INJECTION, SOLUTION INTRAMUSCULAR; INTRAVENOUS; SUBCUTANEOUS
Status: DISCONTINUED | OUTPATIENT
Start: 2022-02-15 | End: 2022-02-19

## 2022-02-15 RX ORDER — NICOTINE POLACRILEX 4 MG
15-30 LOZENGE BUCCAL
Status: DISCONTINUED | OUTPATIENT
Start: 2022-02-15 | End: 2022-02-21 | Stop reason: HOSPADM

## 2022-02-15 RX ORDER — SPIRONOLACTONE 25 MG/1
25 TABLET ORAL DAILY
Status: DISCONTINUED | OUTPATIENT
Start: 2022-02-16 | End: 2022-02-16

## 2022-02-15 RX ORDER — MAGNESIUM SULFATE HEPTAHYDRATE 500 MG/ML
2 INJECTION, SOLUTION INTRAMUSCULAR; INTRAVENOUS ONCE
Status: DISCONTINUED | OUTPATIENT
Start: 2022-02-15 | End: 2022-02-15 | Stop reason: CLARIF

## 2022-02-15 RX ORDER — FUROSEMIDE 40 MG
40 TABLET ORAL DAILY
Status: DISCONTINUED | OUTPATIENT
Start: 2022-02-15 | End: 2022-02-15

## 2022-02-15 RX ORDER — ACETAMINOPHEN 325 MG/1
325 TABLET ORAL EVERY 4 HOURS PRN
Status: DISCONTINUED | OUTPATIENT
Start: 2022-02-15 | End: 2022-02-21 | Stop reason: HOSPADM

## 2022-02-15 RX ORDER — SPIRONOLACTONE 25 MG/1
50 TABLET ORAL DAILY
Status: DISCONTINUED | OUTPATIENT
Start: 2022-02-15 | End: 2022-02-15

## 2022-02-15 RX ORDER — FERROUS GLUCONATE 324(37.5)
1 TABLET ORAL DAILY
COMMUNITY
End: 2022-07-09

## 2022-02-15 RX ORDER — FAMOTIDINE 20 MG/1
20 TABLET, FILM COATED ORAL
Status: ON HOLD | COMMUNITY
End: 2022-11-10

## 2022-02-15 RX ORDER — FUROSEMIDE 20 MG
40 TABLET ORAL DAILY
Status: DISCONTINUED | OUTPATIENT
Start: 2022-02-16 | End: 2022-02-21 | Stop reason: HOSPADM

## 2022-02-15 RX ORDER — MAGNESIUM SULFATE HEPTAHYDRATE 40 MG/ML
2 INJECTION, SOLUTION INTRAVENOUS ONCE
Status: COMPLETED | OUTPATIENT
Start: 2022-02-15 | End: 2022-02-15

## 2022-02-15 RX ORDER — HYDROXYZINE HYDROCHLORIDE 25 MG/1
25 TABLET, FILM COATED ORAL EVERY 6 HOURS PRN
Status: DISCONTINUED | OUTPATIENT
Start: 2022-02-15 | End: 2022-02-16

## 2022-02-15 RX ORDER — OXYCODONE HYDROCHLORIDE 10 MG/1
10 TABLET ORAL EVERY 6 HOURS PRN
Status: DISCONTINUED | OUTPATIENT
Start: 2022-02-15 | End: 2022-02-16

## 2022-02-15 RX ORDER — ONDANSETRON 2 MG/ML
4 INJECTION INTRAMUSCULAR; INTRAVENOUS EVERY 6 HOURS PRN
Status: DISCONTINUED | OUTPATIENT
Start: 2022-02-15 | End: 2022-02-21 | Stop reason: HOSPADM

## 2022-02-15 RX ADMIN — FUROSEMIDE 40 MG: 40 TABLET ORAL at 09:27

## 2022-02-15 RX ADMIN — OXYCODONE HYDROCHLORIDE 5 MG: 5 TABLET ORAL at 01:09

## 2022-02-15 RX ADMIN — INSULIN ASPART 2 UNITS: 100 INJECTION, SOLUTION INTRAVENOUS; SUBCUTANEOUS at 13:22

## 2022-02-15 RX ADMIN — RIFAXIMIN 550 MG: 550 TABLET ORAL at 09:39

## 2022-02-15 RX ADMIN — POTASSIUM CHLORIDE 10 MEQ: 7.46 INJECTION, SOLUTION INTRAVENOUS at 04:29

## 2022-02-15 RX ADMIN — LEVOTHYROXINE SODIUM 175 MCG: 0.03 TABLET ORAL at 09:28

## 2022-02-15 RX ADMIN — POTASSIUM CHLORIDE 40 MEQ: 750 CAPSULE, EXTENDED RELEASE ORAL at 09:29

## 2022-02-15 RX ADMIN — PREGABALIN 25 MG: 25 CAPSULE ORAL at 18:30

## 2022-02-15 RX ADMIN — ACETAMINOPHEN 325 MG: 325 TABLET, FILM COATED ORAL at 21:34

## 2022-02-15 RX ADMIN — POTASSIUM CHLORIDE 10 MEQ: 7.46 INJECTION, SOLUTION INTRAVENOUS at 03:29

## 2022-02-15 RX ADMIN — RIFAXIMIN 550 MG: 550 TABLET ORAL at 19:59

## 2022-02-15 RX ADMIN — HYDROXYZINE HYDROCHLORIDE 25 MG: 25 TABLET ORAL at 04:45

## 2022-02-15 RX ADMIN — POTASSIUM CHLORIDE 40 MEQ: 20 SOLUTION ORAL at 04:45

## 2022-02-15 RX ADMIN — LACTULOSE 30 ML: 10 SOLUTION ORAL at 19:59

## 2022-02-15 RX ADMIN — ACETAMINOPHEN 325 MG: 325 TABLET, FILM COATED ORAL at 05:15

## 2022-02-15 RX ADMIN — LACTULOSE 30 ML: 10 SOLUTION ORAL at 09:27

## 2022-02-15 RX ADMIN — PANCRELIPASE 3 CAPSULE: 30000; 6000; 19000 CAPSULE, DELAYED RELEASE PELLETS ORAL at 09:40

## 2022-02-15 RX ADMIN — FUROSEMIDE 40 MG: 10 INJECTION, SOLUTION INTRAVENOUS at 03:30

## 2022-02-15 RX ADMIN — APIXABAN 5 MG: 5 TABLET, FILM COATED ORAL at 19:59

## 2022-02-15 RX ADMIN — Medication 1 MG: at 23:30

## 2022-02-15 RX ADMIN — PANCRELIPASE 3 CAPSULE: 30000; 6000; 19000 CAPSULE, DELAYED RELEASE PELLETS ORAL at 13:22

## 2022-02-15 RX ADMIN — INSULIN GLARGINE 4 UNITS: 100 INJECTION, SOLUTION SUBCUTANEOUS at 14:36

## 2022-02-15 RX ADMIN — HYDROXYZINE HYDROCHLORIDE 50 MG: 25 TABLET ORAL at 21:34

## 2022-02-15 RX ADMIN — SPIRONOLACTONE 50 MG: 25 TABLET, FILM COATED ORAL at 09:28

## 2022-02-15 RX ADMIN — POTASSIUM CHLORIDE 40 MEQ: 20 SOLUTION ORAL at 03:30

## 2022-02-15 RX ADMIN — ONDANSETRON 4 MG: 2 INJECTION INTRAMUSCULAR; INTRAVENOUS at 20:45

## 2022-02-15 RX ADMIN — MAGNESIUM SULFATE IN WATER 2 G: 40 INJECTION, SOLUTION INTRAVENOUS at 05:15

## 2022-02-15 RX ADMIN — INSULIN ASPART 1 UNITS: 100 INJECTION, SOLUTION INTRAVENOUS; SUBCUTANEOUS at 18:30

## 2022-02-15 RX ADMIN — ATORVASTATIN CALCIUM 20 MG: 20 TABLET, FILM COATED ORAL at 09:28

## 2022-02-15 RX ADMIN — VENLAFAXINE 100 MG: 100 TABLET ORAL at 09:29

## 2022-02-15 RX ADMIN — LACTULOSE 30 ML: 10 SOLUTION ORAL at 13:26

## 2022-02-15 RX ADMIN — FUROSEMIDE 40 MG: 10 INJECTION, SOLUTION INTRAVENOUS at 18:47

## 2022-02-15 RX ADMIN — PANCRELIPASE 3 CAPSULE: 30000; 6000; 19000 CAPSULE, DELAYED RELEASE PELLETS ORAL at 18:34

## 2022-02-15 RX ADMIN — POTASSIUM CHLORIDE 40 MEQ: 750 CAPSULE, EXTENDED RELEASE ORAL at 19:59

## 2022-02-15 RX ADMIN — TRAZODONE HYDROCHLORIDE 100 MG: 100 TABLET ORAL at 23:30

## 2022-02-15 ASSESSMENT — ACTIVITIES OF DAILY LIVING (ADL)
ADLS_ACUITY_SCORE: 11

## 2022-02-15 ASSESSMENT — MIFFLIN-ST. JEOR: SCORE: 1394.63

## 2022-02-15 ASSESSMENT — ENCOUNTER SYMPTOMS
NAUSEA: 1
FEVER: 0
FATIGUE: 1
SPEECH DIFFICULTY: 1

## 2022-02-15 NOTE — PROVIDER NOTIFICATION
"Text paged Randi 4 ANYA with request:  OBS 3, K.M.  Can you please place and \"Admit to Inpatient\" order?  Thanks, AJAY Dickson [phone]  "

## 2022-02-15 NOTE — PROGRESS NOTES
Patient ID:  Susan Puckett  MRN: [id number]  51 year old  YOB: 1970    Observation Admit Date: 2/14/2022    ED Admitting Attending: Sonia Waterman CNP    Transfer Date and Time: February 15, 2022 at 10:19 AM     Transferring Observation Provider: CAIN Linton CNP    Admission Diagnoses:     1. Hypokalemia    2. Hypomagnesemia    3. Prolonged Q-T interval on ECG        Transfer Diagnoses:    Encephalopathy  Hypokalemia  Hypomagnesemia  Prolonged Q-T interval on ECG    Emergency Department and Observation Course:        Susan Puckett is a 51 year old female admitted on 2/14/2022. She has a past medical history significant for type 2 diabetes, seizures, unspecified altered mental status, liver cirrhosis 2/2 to CUETO, pancytopenia, pleural effusion, Raynaud's phenomenon, gastric bypass, hepatic encephalopathy, and GEORGETTE who presents to the emergency department for evaluation of leg swelling and fatigue.     ##Hypokalemia  ##Hypomagnesemia  ##Prolonged QTc  Potassium 2.9, likely magnesium is low as well. Lab is unable to run magnesium levels tonight though, so patient was empirically given magnesium replacement in addition to potassium. EKG shows QTc 545. About one month ago, QTc was 516. Medications: Lasix 40mg IV x1, mag sulfate 2g IV x1, Oxycodone 5mg. Kayciel 40mEq oral. Potassium chloride 10mEq IV x1 attempted but stopped due to pt tolerance. Will attempt administration with concurrent saline for a total of 100ml saline. Given patients history of gastric bypass, likely oral absorption will be suboptimal.  Plan: [place] to ED Observation for correction of electrolyte imbalances and recheck of EKG. Potassium this morning (2/15) was 4.5. Repeat EKG continued to show prolonged QTc but improved from previous.  -Further potassium replacement if needed  -Continue PTA potassium 40mEq BID, spironolactone, lasix  -BMP     ##CUETO s/p TIPS  ##Anemia, Chronic  ##Thrombocytopenia:   Follows with   "[facility]. Child Brown class B, MELD 17. Currently on transplant list, management per gastroenterologist. Pt has esophageal varices. Tonight,  reports that patient is intermittently confused and garbling speech. Pt and  report adherence to lactulose regimen with 4-5 BM daily. Hgb: 8.2, down nearly 2gm since 1/26/22. Platelets 97, stable. Albumin 1.8, Bilirubin 1.8. INR 1.9. Ammonia 47. , up from previous around 200. Patient has 1+ edema in bilateral lower extremities. Patient very anxious about her current medical situation, significant other had panic attack while with patient in the ED. Hepatology saw patient this morning with concern for encephalopathy. See note.    -Hepatology consult  -Continue PTA rifaximin, lactulose     ##DVT of right axillary vein, acute  Patient is currently managed by [facility] thrombosis clinic and is taking a DOAC for 3 months. Platelets 97. Hgb was 10 on 1/26/22, now down to 8.2. INR 1.9.   -Continue PTA Eliquis     ##T2DM: A1c 5.9 on 10/20/21. Patient takes novolog, lantus, and Januvia at home.  -Continue PTA lantus and novolog  -POC glucose with meals and bedtime  - pt has a Dexcom CGM     ##Hypothyroid: TSH 0.06, Free T4 2.26. Pt follows with endocrine and recently had dose changed of levothyroxine.  -Continue PTA levothyroxine     ##HLD: Continue PTA statin     At this time the patient has failed observation management due to encephalopathy concern and hepatology recommendations and will be transferred to inpatient status.    Consults: Hepatology, Psychiatry    DATA:    Transfer Exam:    /57 (BP Location: Left arm)   Pulse 79   Temp 98  F (36.7  C) (Oral)   Resp 16   Ht 1.702 m (5' 7\")   Wt 73.7 kg (162 lb 7.7 oz)   SpO2 100%   BMI 25.45 kg/m         Constitutional: awake, drowsy, cooperative, no apparent distress, and appears stated age  Eyes: Lids and lashes normal, pupils equal, round and reactive to light, extra ocular muscles intact, " sclera clear, conjunctiva normal  ENT: Normocephalic, atraumatic, external ears without lesions  Respiratory: No increased work of breathing, good air exchange, clear to auscultation bilaterally, no crackles or wheezing  Cardiovascular: Normal apical impulse, regular rate and rhythm, normal S1 and S2, no S3 or S4, and no murmur noted  Abdomen: Normal bowel sounds, soft, slightly distended, mildly tender  Skin: normal skin color. No open wounds.  BLE pitting edema, tenderness and warmth  Musculoskeletal: There is pitting edema from below the knee down to the ankles, 1+. Full range motion noted.    Neurologic: Awake but drowsy, alert, oriented to name, place and time.  Cranial nerves II-XII are grossly intact.  Motor is 5 out of 5 bilaterally. Sensory is intact. gait is normal.       Current Medications:    No current outpatient medications on file.       Medications Prior to Admission:    Medications Prior to Admission   Medication Sig Dispense Refill Last Dose     omeprazole (PRILOSEC OTC) 20 MG EC tablet Take 20 mg by mouth daily        oxyCODONE IR (ROXICODONE) 10 MG tablet Take 10 mg by mouth every 4 hours as needed for severe pain (for pain at bedtime)        acetaminophen (TYLENOL) 500 MG tablet Take 1 tablet (500 mg) by mouth every 6 hours as needed for mild pain        acetone urine (KETOSTIX) test strip 1 strip        albuterol (PROAIR HFA/PROVENTIL HFA/VENTOLIN HFA) 108 (90 Base) MCG/ACT inhaler Inhale 1-2 puffs into the lungs  (Patient not taking: Reported on 1/6/2022)        amylase-lipase-protease (CREON 6) 4767-72524-49476 units CPEP Take 3 capsules by mouth 3 times daily (with meals)        atorvastatin (LIPITOR) 20 MG tablet Take 20 mg by mouth every morning         calcium carbonate (TUMS) 500 MG chewable tablet Take 1 tablet by mouth daily as needed for heartburn  (Patient not taking: Reported on 1/10/2022)        calcium citrate-vitamin D (CITRACAL W/D) 250-100 MG-UNIT tablet Take 2 tablets by  mouth 2 times daily (with meals) (Patient taking differently: Take 1 tablet by mouth 2 times daily (with meals) ) 336 tablet 3      Continuous Blood Gluc  (DEXCOM G6 ) LUNA Use as directed for continuous glucose monitoring.        Continuous Blood Gluc Sensor (DEXCOM G6 SENSOR) MISC Use as directed for continuous glucose monitoring.  Change sensor every 10 days.        Continuous Blood Gluc Transmit (DEXCOM G6 TRANSMITTER) MISC Use as directed for continuous glucose monitoring.  Change every 3 months.        cyanocobalamin (VITAMIN B-12) 1000 MCG tablet Take 1,000 mcg by mouth every 7 days         Ferrous Sulfate (IRON) 325 (65 FE) MG tablet Take 1 tablet by mouth every morning         folic acid (FOLVITE) 1 MG tablet Take 1 mg by mouth every morning         furosemide (LASIX) 40 MG tablet Take 1 tablet (40 mg) by mouth daily 30 tablet 3      hydrOXYzine (ATARAX) 25 MG tablet Take 25 mg by mouth 3 times daily as needed for anxiety        insulin aspart (NOVOLOG FLEXPEN) 100 UNIT/ML pen Inject 1u/50>200 every 4 hours as needed for high blood glucose. Up to 20 units daily.  Indications: Diabetes Mellitus        insulin glargine (LANTUS PEN) 100 UNIT/ML pen Inject 8 Units Subcutaneous every morning Takes around 12noon (Patient taking differently: Inject 4 Units Subcutaneous every morning Takes around 12noon) 15 mL 0      insulin pen needle (BD GRISEL U/F) 32G X 4 MM miscellaneous Inject 1 each Subcutaneous        lactulose (CHRONULAC) 10 GM/15ML solution TAKE 30 MLS BY MOUTH 3 TIMES DAILY (Patient taking differently: 3 times daily ) 3784 mL 5      levothyroxine (SYNTHROID/LEVOTHROID) 200 MCG tablet Take 175 mcg by mouth         LORazepam (ATIVAN) 0.5 MG tablet Take 1 tablet (0.5 mg) by mouth every 6 hours as needed for anxiety 5 tablet 0      metoclopramide (REGLAN) 10 MG tablet Take 10 mg by mouth        Multiple Vitamin (MULTIVITAMIN PO) Take 2 tablets by mouth every morning         multivitamin CF  formula (AQUADEKS) chewable tablet Take 1 tablet by mouth daily 90 tablet 3      ondansetron (ZOFRAN-ODT) 4 MG ODT tab Place 1 tablet (4 mg) under the tongue every 6 hours as needed for nausea 120 tablet 1      potassium chloride ER (K-TAB) 20 MEQ CR tablet Take 2 tablets (40 mEq) by mouth 2 times daily 120 tablet 0      rifampin (RIFADIN) 300 MG capsule TAKE 1 CAPSULE BY MOUTH EVERY DAY (Patient not taking: Reported on 1/10/2022) 90 capsule 3      rifaximin (XIFAXAN) 550 MG TABS tablet Take 1 tablet (550 mg) by mouth 2 times daily 60 tablet 11      Rimegepant Sulfate 75 MG TBDP Take 75 mg by mouth (Patient not taking: Reported on 1/10/2022)        simethicone (MYLICON) 80 MG chewable tablet Take 80 mg by mouth every 6 hours as needed for flatulence or cramping (Patient not taking: Reported on 1/10/2022)        sitagliptin (JANUVIA) 100 MG tablet Take 100 mg by mouth every morning         spironolactone (ALDACTONE) 50 MG tablet Take 1 tablet (50 mg) by mouth daily 30 tablet 3      SUMAtriptan (IMITREX) 50 MG tablet Take 50 mg by mouth at onset of headache for migraine         topiramate (TOPAMAX) 50 MG tablet Take 50 mg by mouth daily         traZODone (DESYREL) 100 MG tablet Take  mg by mouth nightly as needed for sleep        valACYclovir (VALTREX) 1000 mg tablet Take 1,000 mg by mouth daily as needed (at onset of cold sore)         venlafaxine (EFFEXOR) 75 MG tablet Take 1 tablet (75 mg) by mouth At Bedtime (Patient taking differently: Take 100 mg by mouth 2 times daily ) 30 tablet 0      vitamin A 3 MG (56017 UNITS) capsule Take 10,000 Units by mouth every morning         vitamin C (ASCORBIC ACID) 1000 MG TABS         vitamin D2 (ERGOCALCIFEROL) 75015 units (1250 mcg) capsule Take 1 capsule (50,000 Units) by mouth once a week 8 capsule 0      vitamin D3 (CHOLECALCIFEROL) 50 mcg (2000 units) tablet Take 1 tablet (50 mcg) by mouth daily 90 tablet 3      vitamin E (TOCOPHEROL) 400 units (180 mg) capsule Take  400 Units by mouth every morning           Significant Diagnostic Studies:     Results for orders placed or performed during the hospital encounter of 02/14/22   Comprehensive metabolic panel     Status: Abnormal   Result Value Ref Range    Sodium 140 133 - 144 mmol/L    Potassium 2.9 (L) 3.4 - 5.3 mmol/L    Chloride 102 94 - 109 mmol/L    Carbon Dioxide (CO2) 30 20 - 32 mmol/L    Anion Gap 8 3 - 14 mmol/L    Urea Nitrogen 6 (L) 7 - 30 mg/dL    Creatinine 1.09 (H) 0.52 - 1.04 mg/dL    Calcium 7.9 (L) 8.5 - 10.1 mg/dL    Glucose 146 (H) 70 - 99 mg/dL    Alkaline Phosphatase 161 (H) 40 - 150 U/L    AST 58 (H) 0 - 45 U/L    ALT 27 0 - 50 U/L    Protein Total 5.5 (L) 6.8 - 8.8 g/dL    Albumin 1.8 (L) 3.4 - 5.0 g/dL    Bilirubin Total 1.8 (H) 0.2 - 1.3 mg/dL    GFR Estimate 62 >60 mL/min/1.73m2   Ammonia     Status: Normal   Result Value Ref Range    Ammonia 47 10 - 50 umol/L   Magnesium     Status: Abnormal   Result Value Ref Range    Magnesium 1.6 (L) 1.8 - 2.6 mg/dL   Nt probnp inpatient (BNP)     Status: Abnormal   Result Value Ref Range    N terminal Pro BNP Inpatient 653 (H) 0 - 450 pg/mL   INR     Status: Abnormal   Result Value Ref Range    INR 1.90 (H) 0.85 - 1.15   Troponin I     Status: Normal   Result Value Ref Range    Troponin I High Sensitivity 7 <54 ng/L   TSH with free T4 reflex     Status: Abnormal   Result Value Ref Range    TSH 0.06 (L) 0.40 - 4.00 mU/L   CBC with platelets and differential     Status: Abnormal   Result Value Ref Range    WBC Count 2.7 (L) 4.0 - 11.0 10e3/uL    RBC Count 2.43 (L) 3.80 - 5.20 10e6/uL    Hemoglobin 8.2 (L) 11.7 - 15.7 g/dL    Hematocrit 25.6 (L) 35.0 - 47.0 %     (H) 78 - 100 fL    MCH 33.7 (H) 26.5 - 33.0 pg    MCHC 32.0 31.5 - 36.5 g/dL    RDW 17.3 (H) 10.0 - 15.0 %    Platelet Count 97 (L) 150 - 450 10e3/uL    % Neutrophils 49 %    % Lymphocytes 32 %    % Monocytes 12 %    % Eosinophils 6 %    % Basophils 1 %    % Immature Granulocytes 0 %    NRBCs per 100 WBC  0 <1 /100    Absolute Neutrophils 1.4 (L) 1.6 - 8.3 10e3/uL    Absolute Lymphocytes 0.8 0.8 - 5.3 10e3/uL    Absolute Monocytes 0.3 0.0 - 1.3 10e3/uL    Absolute Eosinophils 0.2 0.0 - 0.7 10e3/uL    Absolute Basophils 0.0 0.0 - 0.2 10e3/uL    Absolute Immature Granulocytes 0.0 <=0.4 10e3/uL    Absolute NRBCs 0.0 10e3/uL   T4 free     Status: Abnormal   Result Value Ref Range    Free T4 2.26 (H) 0.76 - 1.46 ng/dL   Extra Purple Top Tube     Status: None   Result Value Ref Range    Hold Specimen JIC    Asymptomatic COVID-19 Virus (Coronavirus) by PCR Nasopharyngeal     Status: Normal    Specimen: Nasopharyngeal; Swab   Result Value Ref Range    SARS CoV2 PCR Negative Negative, Testing sent to reference lab. Results will be returned via unsolicited result    Narrative    Testing was performed using the Xpert Xpress SARS-CoV-2 Assay on the  Cepheid Gene-Xpert Instrument Systems. Additional information about  this Emergency Use Authorization (EUA) assay can be found via the Lab  Guide. This test should be ordered for the detection of SARS-CoV-2 in  individuals who meet SARS-CoV-2 clinical and/or epidemiological  criteria. Test performance is unknown in asymptomatic patients. This  test is for in vitro diagnostic use under the FDA EUA for  laboratories certified under CLIA to perform high complexity testing.  This test has not been FDA cleared or approved. A negative result  does not rule out the presence of PCR inhibitors in the specimen or  target RNA in concentration below the limit of detection for the  assay. The possibility of a false negative should be considered if  the patient's recent exposure or clinical presentation suggests  COVID-19. This test was validated by the [facility]. This laboratory is certified under  the Clinical Laboratory Improvement Amendments of 1988 (CLIA-88) as  qualified to perform high complexity laboratory testing.     Basic metabolic  panel     Status: Abnormal   Result Value Ref Range    Sodium 137 133 - 144 mmol/L    Potassium 4.5 3.4 - 5.3 mmol/L    Chloride 106 94 - 109 mmol/L    Carbon Dioxide (CO2) 25 20 - 32 mmol/L    Anion Gap 6 3 - 14 mmol/L    Urea Nitrogen 6 (L) 7 - 30 mg/dL    Creatinine 0.99 0.52 - 1.04 mg/dL    Calcium 8.1 (L) 8.5 - 10.1 mg/dL    Glucose 142 (H) 70 - 99 mg/dL    GFR Estimate 70 >60 mL/min/1.73m2   Extra Purple Top Tube     Status: None   Result Value Ref Range    Hold Specimen [facility]    Lactic acid whole blood     Status: Normal   Result Value Ref Range    Lactic Acid 1.5 0.7 - 2.0 mmol/L   Glucose by meter     Status: Abnormal   Result Value Ref Range    GLUCOSE BY METER POCT 118 (H) 70 - 99 mg/dL   Glucose by meter     Status: Abnormal   Result Value Ref Range    GLUCOSE BY METER POCT 118 (H) 70 - 99 mg/dL   EKG 12-lead, tracing only     Status: None   Result Value Ref Range    Systolic Blood Pressure  mmHg    Diastolic Blood Pressure  mmHg    Ventricular Rate 71 BPM    Atrial Rate 71 BPM    SC Interval 120 ms    QRS Duration 84 ms     ms    QTc 545 ms    P Axis  degrees    R AXIS -15 degrees    T Axis 0 degrees    Interpretation ECG       Sinus rhythm  Moderate voltage criteria for LVH, may be normal variant  Prolonged QT  Abnormal ECG  Unconfirmed report - interpretation of this ECG is computer generated - see medical record for final interpretation  Confirmed by - EMERGENCY ROOM, PHYSICIAN ([id number]),  ISAAC BARRIOS ([id number]) on 2/15/2022 8:09:49 AM     EKG 12 lead     Status: None (Preliminary result)   Result Value Ref Range    Systolic Blood Pressure  mmHg    Diastolic Blood Pressure  mmHg    Ventricular Rate 79 BPM    Atrial Rate 79 BPM    SC Interval 104 ms    QRS Duration 74 ms     ms    QTc 495 ms    P Axis  degrees    R AXIS -18 degrees    T Axis 0 degrees    Interpretation ECG       Sinus rhythm with short SC  Minimal voltage criteria for LVH, may be normal variant  Prolonged QT  Abnormal  ECG  When compared with ECG of 15-FEB-2022 00:30,  QT has shortened     CBC with platelets differential     Status: Abnormal    Narrative    The following orders were created for panel order CBC with platelets differential.  Procedure                               Abnormality         Status                     ---------                               -----------         ------                     CBC with platelets and d...[[id number]]  Abnormal            Final result                 Please view results for these tests on the individual orders.   Extra Tube     Status: None    Narrative    The following orders were created for panel order Extra Tube.  Procedure                               Abnormality         Status                     ---------                               -----------         ------                     Extra Purple Top Tube[[id number]]                            Final result                 Please view results for these tests on the individual orders.   Extra Tube     Status: None    Narrative    The following orders were created for panel order Extra Tube.  Procedure                               Abnormality         Status                     ---------                               -----------         ------                     Extra Purple Top Tube[[id number]]                            Final result                 Please view results for these tests on the individual orders.       Signed:  CAIN Linton CNP  February 15, 2022 at 10:19 AM

## 2022-02-15 NOTE — ED TRIAGE NOTES
"Pt comes in with complaint of bilateral lower extremity swelling for over a week as well as fatigue. She also states she has been experiencing nausea with low appetite.     states that pt has been sleeping more than normal and that she is not as \"alert\" as normal. She has been slurring words and having some confusion for over a week.    Pt tried to contact her PCP and got no response back so she decided to come in.     Pt's  states that she has been having issues with her blood sugars going from high to low.     VSS  "

## 2022-02-15 NOTE — PROGRESS NOTES
GASTROENTEROLOGY CONSULTATION      Date of Admission:  2/14/2022           Reason for Consultation:   We were asked by Dr. Berry of the ED to evaluate this patient with being on liver transplant list, in ED Obs with electrolyte abnormalities and anxiety.           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  Found to be hypokalemic in ED.  PE notable for signs of HE.     #HE  Pt with type B (from TIPS) vs Type C (from cirrhosis) grade II (+asterixis, difficulty in answering time orientation questions and lethargy) recurrent (hospitalized with HE in the last 6 months).  Considered the possibility of other causes of AMS, like her opioid use and psychotropic medications (in particular, topirimate , trazodone), but the presence of asterixis points towards HE  - There are several potential precipitants for this.  Her TIPS procedure already places her at increased risk    Electrolyte disorder is likely.  She had hypokalemia to 2.9 on arrival, which the team has corrected.    Dehydration/increased diuretic use is likely.  She is on 40mg of lasix and 100mg of spironolactone, and her           4-5BM and 3X emesis represent further fluid losses.      Considered GI bleed.  Her decrease in Hgb is consistent and the fact that she is on eliquis places her at risk.       However, she denies melena/hematochezia/hematemesis, and her BUN is low, which point away.    Considered infx. She does endorse feeling sick but I suspect this is just her liver dz.  She does endorse some     URI sx (increased coughing/sneezing) but not fever.  - Her ammonia level is normal at 47.  - US to assess for ascites, if there, diagnostic paracentesis  - BC, UC    #Hypokalemia  Hypokalemic to 2.9 arrival, corrected to 4.5   - This is likely from a  "combination of diuretic use (lasix would precipitate hypokalemia while spironolactone would be expected to protect potassium somewhat), diarrhea with 4-5 BM/day and vomiting X3/week.  - Her insulin dose being lowered would protect her potassium, and she isn't using her albuterol.    #s/p TIPS  - She saw Dr. Storey 1/10/2022, at which time her TIPS was working well.  She had CT abdomen/pelvis 1/27/2022 showing patent TIPS  - US to assess TIPS patency, US with TIPS doppler    #CUETO  On the liver transplant list, but low MELD score. MELD Na calculated at 18, based on sodium 137, creatinine 0.99, Tbili 1.8, INR 1.9  Sees Dr. Cook, last seen 1/06/2022    Recommendations  - US to assess for ascites  - US with TIPS doppler  - BC,   - Hold zofran for QT prolongation  - minimize opioids  - anxiety management per primary team  - continue lactulose, titrate for a goal of 3-4 BM/day  - continue rifaximin  - continue lasix 40mg and spirinolactone 100mg    Gastroenterology outpatient follow up recommendations: ***    Thank you for involving us in this patient's care. Please do not hesitate to contact the GI service with any questions or concerns.     Pt care plan discussed with Dr. Sadler, GI staff physician.    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    She says she came in because of \"a combination of many things\" including leg swelling, and that she \"can't hardly stay awake.\"    She endorses leg swelling up to her hips and and waist.  She is taking 40mg lasix and 100mg spirinolactone.  She thinks she was recently restarted on these, but can't recall when.  She tries to drink 64 oz water a day, and hasn't noticed any " "change in her fluid intake.    In terms of her HE symptoms, she endorses feeling intermittently tired, confused, slurring her words.  For HE risk factors, she was recently hospitalized a couple times for HE, most recently 12/26-12/28.  After this, she has been taking her lactulose and rifaximin.  She is having 4-5 BM/day, which is more than she was having in December.    She denies any melena, hematochezia, hematemesis.  She is on a Eliquis.  She is vomiting \"spit-up\" nonbloody nonbilious emesis about 3X/week.    She denies any fever, or night sweats, does get cold easily.  No sick contacts, does feel sick, coughing and sneezing slightly more than usual.    She says her liver nutritionist has told her to \"eat what I want when I want\" but she has a low appetite, which she has had for a while.  She tells me she tries to eat protein, and is eating yogurt.    In terms of other risk factors of AMS, she is taking oxycodone, 5mg about X1/day.  She isn't taking lorazepam.    In terms of hypokalemia risk factors, she is taking 40mg lasix, and 100mg spironolactone.  She hasn't needed to use her albuterol inhaler.  Her insulin dose was changed recently - it was decreased on 1/18 by Dr. Stovall (see care everywhere note) to 4 units Lantus, and 1-2 units novolog  She is having 4-5 stools a day and nonbloody, nonbilious emesis 3X/week.         Data:   Key relevant labs:   LABS:  BMP  Recent Labs   Lab 02/15/22  0636 02/15/22  0118    140   POTASSIUM 4.5 2.9*   CHLORIDE 106 102   MAURI 8.1* 7.9*   CO2 25 30   BUN 6* 6*   CR 0.99 1.09*   * 146*     CBC  Recent Labs   Lab 02/15/22  0049   WBC 2.7*   RBC 2.43*   HGB 8.2*   HCT 25.6*   *   MCH 33.7*   MCHC 32.0   RDW 17.3*   PLT 97*     INR  Recent Labs   Lab 02/15/22  0118   INR 1.90*     LFTs  Recent Labs   Lab 02/15/22  0118   ALKPHOS 161*   AST 58*   ALT 27   BILITOTAL 1.8*   PROTTOTAL 5.5*   ALBUMIN 1.8*      PANCNo lab results found in last 7 days.    Key " "relevant imaging:           Previous Endoscopy:   ***         Medications:   { :[id number]}          Physical Exam:   /57 (BP Location: Left arm)   Pulse 79   Temp 98  F (36.7  C) (Oral)   Resp 16   Ht 1.702 m (5' 7\")   Wt 73.7 kg (162 lb 7.7 oz)   SpO2 100%   BMI 25.45 kg/m    Wt:   Wt Readings from Last 2 Encounters:   02/15/22 73.7 kg (162 lb 7.7 oz)   01/24/22 71.9 kg (158 lb 9.6 oz)      Constitutional: lying in bed, appears chronically ill, but in NAD.    Eyes: Sclera anicteric/injected  Ears/nose/mouth/throat: Normal oropharynx without ulcers or exudate, mucus membranes moist, hearing intact  CV: RRR, systolic murmur.  2+ pitting edema bilaterally up to mid-shin.    Respiratory: Unlabored breathing  Abd: Soft, Nondistended, +bs, no hepatosplenomegaly, nontender, no peritoneal signs.  ***shifting dullness****???  Skin: warm, perfused, skin looks hyperpigmented maybe jaundiced  Neuro: AAO x 3, but it takes her a long time to answer time orientation,  +asterixis  Psych: Pleasant, slow with some answers,  MSK: No gross deformities          Past Medical History:   Reviewed and edited as appropriate  Past Medical History:   Diagnosis Date     Anemia      Anxiety      Cold sore      Depressive disorder      Diabetes (H)     Type 2 DM/No Insulin      Encephalopathy      Esophageal varices without bleeding (H)     grade I     History of blood transfusion     10/2019     History of seizure     diabetic seizure     Insomnia      Liver cirrhosis secondary to CUETO (H) 05/28/2020     Migraine      Pleural effusion      Thrombocytopenia (H)      Thyroid disease             Past Surgical History:   Reviewed and edited as appropriate   Past Surgical History:   Procedure Laterality Date     APPENDECTOMY      1989     COLONOSCOPY      1/2020 at [facility]      ENT SURGERY  1998    tempanoplasty     GI SURGERY      EGD August 2020 at [facility]      GYN SURGERY  2010    laparoscopic ablation     IR TRANSVEN " INTRAHEPATIC PORTOSYST SHUNT  11/5/2021     MAMMOPLASTY REDUCTION BILATERAL  2004     AL STAB PHELBECTOMY VARICOSE VEINS, LESS THAN 10 INCISIONS, ONE EXTREMITY  2020     RNY gastric bypass  01/2006    retoocolic, retrogastric, [facility]     TONSILLECTOMY & ADENOIDECTOMY Bilateral 1978     WISDOM TEETH EXTRACTION              Social History:   Alcohol use: not for many years  Smoking history: never smoker  Living situation: lives with .  On disability         Family History:   Reviewed and edited as appropriate  Family History   Problem Relation Age of Onset     Anesthesia Reaction Nephew         PONV     Bleeding Disorder No family hx of      Clotting Disorder No family hx of      *** No known history of colorectal cancer, liver disease, or inflammatory bowel disease.         Allergies:   Reviewed and edited as appropriate     Allergies   Allergen Reactions     Methylprednisolone Hives     Droperidol Anxiety     Nsaids Other (See Comments)     GI bleed.     Prednisone Anxiety, Hives and Rash              Review of Systems:     A complete 10 point review of systems was performed and is negative except as noted in the HPI

## 2022-02-15 NOTE — UTILIZATION REVIEW
Admission Status; Secondary Review Determination     Admission Date: 2/14/2022 10:47 PM       Under the authority of the Utilization Management Committee, the utilization review process indicated a secondary review on the above patient.  The review outcome is based on review of the medical records, discussions with staff, and applying clinical experience noted on the date of the review.        (x)      Inpatient Status Appropriate - This patient's medical care is consistent with medical management for inpatient care and reasonable inpatient medical practice.       RATIONALE FOR DETERMINATION      Brief clinical presentation, information copied from the chart, abbreviated and edited for relevant content:     GI recommending IP care, agree. Paged team to advance to IP.       Patient is a 51 year old female with a history of CUETO cirrhosis c/b HE, hepatic hydrothorax s/p TIPs in 11/2021, on the liver transplant list, Faustino en Y GB surgery in 2006, DMII on insulin who presents with leg swelling and fatigue.  Found to be hypokalemic in ED and has evidence of hepatic encephalopathy. Pt with type B (from TIPS) vs Type C (from cirrhosis) grade II (+asterixis, difficulty in answering time orientation questions and lethargy) recurrent (hospitalized with HE in the last 6 months).  Lactulose use resulting in relative hypernatremia. Pt has free water deficit of approx 1.4L.  She had hypokalemia to 2.9 on arrival, which the team has corrected. Plan for   US to assess for ascites, if there, diagnostic paracentesis  CHeck BC, UCx.. Also with Hepatic hydrothorax s/p TIPS. US to assess TIPS patency, US with TIPS doppler         At the time of admission with the information available to the attending physician, more than 2 nights hospital complex care was anticipated. Also, there was a risk of adverse outcome if patient was treated outpatient or observation. High intensity of services anticipated. Inpatient admission appropriate based  on Medicare guidelines.       The information on this document is developed by the utilization review team in order for the business office to ensure compliance.  This only denotes the appropriateness of proper admission status and does not reflect the quality of care rendered.         The definitions of Inpatient Status and Observation Status used in making the determination above are those provided in the CMS Coverage Manual, Chapter 1 and Chapter 6, section 70.4.      Sincerely,      Charlene Choi MD   Utilization Review/ Case Management  [facility].

## 2022-02-15 NOTE — CONSULTS
"[facility]    Hepatology Consult    Requesting provider: Marlon Berry MD    Consult requested for pt on liver transplant list, in ED Obs with electrolyte abnormalities and anxiety. Labs declining.      HPI:  51 year old female with a history of CUETO cirrhosis c/b HE and hepatic hydrothorax s/p TIPs in 11/2021, Faustino en Y GB in 2006, DMII on insulin who presents with leg swelling and fatigue.     She says she came in because of \"a combination of many things\" including leg swelling, and that she \"can't hardly stay awake.\"     She endorses leg swelling up to her hips and and waist.  She is taking 40mg lasix and 100mg spirinolactone.  She thinks she was recently restarted on these, but can't recall when.  She tries to drink 64 oz water a day, and hasn't noticed any change in her fluid intake.     In terms of her HE symptoms, she endorses feeling intermittently tired, confused, slurring her words.  For HE risk factors, she was recently hospitalized a couple times for HE, most recently 12/26-12/28.  After this, she has been taking her lactulose and rifaximin.  She is having 4-5 BM/day, which is more than she was having in December.    She denies any melena, hematochezia, hematemesis.  She is on a Eliquis.  She is vomiting \"spit-up\" nonbloody nonbilious emesis about 3X/week.    She denies any fever, or night sweats, does get cold easily.  No sick contacts, does feel sick, coughing and sneezing slightly more than usual.    She says her liver nutritionist has told her to \"eat what I want when I want\" but she has a low appetite, which she has had for a while.  She tells me she tries to eat protein, and is eating yogurt.    In terms of other risk factors of AMS, she is taking oxycodone, 5mg about X1/day.  She isn't taking lorazepam.     In terms of hypokalemia risk factors, she is taking 40mg lasix, and 100mg spironolactone.  She hasn't needed to use her albuterol inhaler.  Her insulin " dose was changed recently - it was decreased on 1/18 by Dr. Stovall (see care everywhere note) to 4 units Lantus, and 1-2 units novolog  She is having 4-5 stools a day and nonbloody, nonbilious emesis 3X/week.    Lives with her , nonsmoker, no ETOH since 2017, not working as she is on disability.      Medical hx Surgical hx   Past Medical History:   Diagnosis Date     Anemia      Anxiety      Cold sore      Depressive disorder      Diabetes (H)     Type 2 DM/No Insulin      Encephalopathy      Esophageal varices without bleeding (H)     grade I     History of blood transfusion     10/2019     History of seizure     diabetic seizure     Insomnia      Liver cirrhosis secondary to CUETO (H) 05/28/2020     Migraine      Pleural effusion      Thrombocytopenia (H)      Thyroid disease       Past Surgical History:   Procedure Laterality Date     APPENDECTOMY      1989     COLONOSCOPY      1/2020 at [facility]      ENT SURGERY  1998    tempanoplasty     GI SURGERY      EGD August 2020 at [facility]      GYN SURGERY  2010    laparoscopic ablation     IR TRANSVEN INTRAHEPATIC PORTOSYST SHUNT  11/5/2021     MAMMOPLASTY REDUCTION BILATERAL  2004     [place] STAB PHELBECTOMY VARICOSE VEINS, LESS THAN 10 INCISIONS, ONE EXTREMITY  2020     RNY gastric bypass  01/2006    retoocolic, retrogastric, [facility]     TONSILLECTOMY & ADENOIDECTOMY Bilateral 1978     WISDOM TEETH EXTRACTION            Medications  Current Facility-Administered Medications   Medication Dose Route Frequency     amylase-lipase-protease  3 capsule Oral TID w/meals     atorvastatin  20 mg Oral QAM     furosemide  40 mg Oral Daily     insulin aspart  1-7 Units Subcutaneous TID AC     insulin aspart  1-5 Units Subcutaneous At Bedtime     insulin glargine  4 Units Subcutaneous QAM     lactulose  30 mL Oral TID     levothyroxine  175 mcg Oral Daily     potassium chloride ER  40 mEq Oral BID     rifaximin  550 mg Oral BID     sodium chloride (PF)  3 mL  "Intracatheter Q8H     spironolactone  50 mg Oral Daily     venlafaxine  100 mg Oral BID       Allergies  Allergies   Allergen Reactions     Methylprednisolone Hives     Droperidol Anxiety     Nsaids Other (See Comments)     GI bleed.     Prednisone Anxiety, Hives and Rash       Family hx Social hx   Family History   Problem Relation Age of Onset     Anesthesia Reaction Nephew         PONV     Bleeding Disorder No family hx of      Clotting Disorder No family hx of       Social History     Tobacco Use     Smoking status: Never Smoker     Smokeless tobacco: Never Used   Substance Use Topics     Alcohol use: Not Currently     Comment: Last drink was in 2017     Drug use: Never          Review of systems  A 10-point review of systems was negative.      Examination  /57 (BP Location: Left arm)   Pulse 79   Temp 98  F (36.7  C) (Oral)   Resp 16   Ht 1.702 m (5' 7\")   Wt 73.7 kg (162 lb 7.7 oz)   SpO2 100%   BMI 25.45 kg/m    No intake or output data in the 24 hours ending 02/15/22 1010    Gen- drowsy  Eye- EOMI  ENT- MMM  CVS- RRR  RS- CTA  Abd-NT/ND, BS+  Extr- 1+  Neuro- + asterixis      Laboratory  Lab Results   Component Value Date     02/15/2022     07/05/2021    POTASSIUM 4.5 02/15/2022    POTASSIUM 3.0 07/05/2021    CHLORIDE 106 02/15/2022    CHLORIDE 107 07/05/2021    CO2 25 02/15/2022    CO2 29 07/05/2021    BUN 6 02/15/2022    BUN 11 07/05/2021    CR 0.99 02/15/2022    CR 1.10 07/05/2021       Lab Results   Component Value Date    BILITOTAL 1.8 02/15/2022    BILITOTAL 0.5 07/05/2021    ALT 27 02/15/2022    ALT 24 07/05/2021    AST 58 02/15/2022    AST 33 07/05/2021    ALKPHOS 161 02/15/2022    ALKPHOS 133 07/05/2021       Lab Results   Component Value Date    ALBUMIN 1.8 02/15/2022    ALBUMIN 2.6 07/05/2021    PROTTOTAL 5.5 02/15/2022    PROTTOTAL 6.9 07/05/2021        Lab Results   Component Value Date    WBC 2.7 02/15/2022    WBC 4.0 07/05/2021    HGB 8.2 02/15/2022    HGB 11.5 " 07/05/2021     02/15/2022    MCV 97 07/05/2021    PLT 97 02/15/2022     07/05/2021       Lab Results   Component Value Date    INR 1.90 02/15/2022    INR 1.18 07/05/2021       MELD-Na score: 16 at 2/15/2022  6:36 AM  MELD score: 16 at 2/15/2022  6:36 AM  Calculated from:  Serum Creatinine: 0.99 mg/dL (Using min of 1 mg/dL) at 2/15/2022  6:36 AM  Serum Sodium: 137 mmol/L at 2/15/2022  6:36 AM  Total Bilirubin: 1.8 mg/dL at 2/15/2022  1:18 AM  INR(ratio): 1.90 at 2/15/2022  1:18 AM  Age: 51 years    Assessment  51 year old female with a history of CUETO cirrhosis c/b HE  And hepatic hydrothorax s/p TIPs in 11/2021, on the liver transplant list, Faustino en Y GB surgery in 2006, DMII on insulin who presents with leg swelling and fatigue.  Found to be hypokalemic in ED and has evidence of HE.     #Decompensated CUETO cirrhosis MELD-Na 16  On the liver transplant list, but low MELD score. MELD Na calculated at 16, based on sodium 137, creatinine 0.99, Tbili 1.8, INR 1.9. She has decompensation related to her HE.    #Hepatic encephalopathy   #Relative hypernatremia  Pt with type B (from TIPS) vs Type C (from cirrhosis) grade II (+asterixis, difficulty in answering time orientation questions and lethargy) recurrent (hospitalized with HE in the last 6 months).  Considered the possibility of other causes of AMS, like her opioid use and psychotropic medications (in particular, topirimate , trazodone), but the presence of asterixis points towards HE  - Lactulose use resulting in relative hypernatremia. Pt has free water deficit of approx 1.4L.  - There are several potential precipitants for this.  Her TIPS procedure already places her at increased risk    Electrolyte disorder is likely.  She had hypokalemia to 2.9 on arrival, which the team has corrected.    Considered infection. She does endorse some URI sx (increased coughing/sneezing) but not fever.  - US to assess for ascites, if there, diagnostic paracentesis  -  BC, UC     #Hypokalemia   Hypokalemic to 2.9 arrival, corrected to 4.5  - This is likely from a combination of diuretic use (lasix would precipitate hypokalemia while spironolactone would be expected to protect potassium somewhat), diarrhea with 4-5 BM/day and vomiting X3/week, lactulose using also resulting in relative hypernatremia. Pt has free water deficit of approx 1.4L.  - Her insulin dose being lowered would protect her potassium, and she isn't using her albuterol.    #Hepatic hydrothorax s/p TIPS  - Pt underwent TIPS placement on 11/5/21 for refractory hydrothorax.  She had CT abdomen/pelvis 1/27/2022 showing patent TIPS  - US to assess TIPS patency, US with TIPS doppler    Recommendations  - US ABD with TIPS doppler  - Blood Cx and Urine Cx to r/o infection  - Increase free water intake or can start D5W if oral intake remains poor  - continue lactulose, titrate for a goal of 3-4 BM/day, avoid if having more than 4 BMs  - continue rifaximin  - continue lasix 40mg and spirinolactone 100mg  - Hold zofran for QTc prolongation  - minimize opioids as will cause worsening in HE  - anxiety management per primary team  - Recommend high protein diet with protein shakes TID and dietician consultation       Thank you for involving us in this patient's care. Please do not hesitate to contact the GI service with any questions or concerns.      Pt care plan discussed with Dr. Sadler, GI staff physician.     Markell Valle    I was present with the medical student who participated in the service and in the documentation of the note.  I have verified the history and personally performed the physical exam and medical decision making.  I agree with the assessment and plan of care as documented in the note.    Pelon Scott  Hepatology Fellow  #[id number]

## 2022-02-15 NOTE — ED PROVIDER NOTES
"    [place] EMERGENCY DEPARTMENT ([facility])  2/14/22 ED07  History     Chief Complaint   Patient presents with     Leg Swelling     Fatigue     The history is provided by the patient and the spouse.     Susan Puckett is a 51 year old female with a past medical history of type 2 diabetes, seizures, unspecified altered mental status, liver cirrhosis 2/2 to CUETO, pancytopenia, pleural effusion, Raynaud's phenomenon, gastric bypass, hepatic encephalopathy, and GEORGETTE who presents to the emergency department for evaluation of leg swelling and fatigue.  The patient presents to the ED with her .  She reports experiencing swelling from her \"toes to hip\" states that she has never experienced swelling with this severity. She does report experiencing edema at baseline due to liver complications.  Patient reports that she has been wearing zipper compression socks.   Patient states that she has been taking both Tylenol and oxycodone for pain (she had a recent fall in which she was prescribed oxycodone).  She states that the pain in her legs has been increasing over the last 3 to 4 days.  She reports attempting to alleviate pain by elevating her legs, after she did this tonight she could barely stand and needed assistance to walk.  She reports using a cane to walk at baseline.  She reports that her weight has been fluctuating \"up-and-down,\" currently, she reports an increase in weight. She reports being adherent and taking her diuretics and lactulose medications, she has not missed any doses recently. She reports that she follows a salt restricted diet.  Additionally, she states that she has been eating significantly less, therefore she would not be able to reach her salt intake limit.  reports that the patient has been having 4-5 bowel movements per day. She denies drinking alcohol recently. She reports experiencing nausea.  She states that she has been \"drooling a lot.\"  She denies experiencing fever, she " "states that she is presented to her PCP recently with no fever   reports that the patient has been experiencing confusion and slurring her words, this was happening earlier today.  He also reports that \"she does not make sense now and then.\"  Her  states that the patient was alert for approximately 2 to 3 hours in the morning, then by mid afternoon she becomes quite fatigued and began experiencing slurred speech and confusion.  She reports taking 100 mg of spironolactone and 40 mg of furosemide. Of note, she reports being on a \"different Effexor now.\"  She states that she received a prescription for Effexor on Friday but this \"knocks her out.\"  She states that she has not been taking it in the morning, she has been taking it at night but is unsure if this medication interacts with her trazodone and/or melatonin.      Past Medical History  Past Medical History:   Diagnosis Date     Anemia      Anxiety      Cold sore      Depressive disorder      Diabetes (H)     Type 2 DM/No Insulin      Encephalopathy      Esophageal varices without bleeding (H)     grade I     History of blood transfusion     10/2019     History of seizure     diabetic seizure     Insomnia      Liver cirrhosis secondary to CUETO (H) 05/28/2020     Migraine      Pleural effusion      Thrombocytopenia (H)      Thyroid disease      Past Surgical History:   Procedure Laterality Date     APPENDECTOMY      1989     COLONOSCOPY      1/2020 at [facility]      ENT SURGERY  1998    tempanoplasty     GI SURGERY      EGD August 2020 at [facility]      GYN SURGERY  2010    laparoscopic ablation     IR TRANSVEN INTRAHEPATIC PORTOSYST SHUNT  11/5/2021     MAMMOPLASTY REDUCTION BILATERAL  2004     MI STAB PHELBECTOMY VARICOSE VEINS, LESS THAN 10 INCISIONS, ONE EXTREMITY  2020     RNY gastric bypass  01/2006    retoocolic, retrogastric, [facility]     TONSILLECTOMY & ADENOIDECTOMY Bilateral 1978     WISDOM TEETH EXTRACTION       acetaminophen " (TYLENOL) 500 MG tablet  acetone urine (KETOSTIX) test strip  albuterol (PROAIR HFA/PROVENTIL HFA/VENTOLIN HFA) 108 (90 Base) MCG/ACT inhaler  amylase-lipase-protease (CREON 6) 4588-45944-69655 units CPEP  atorvastatin (LIPITOR) 20 MG tablet  calcium carbonate (TUMS) 500 MG chewable tablet  calcium citrate-vitamin D (CITRACAL W/D) 250-100 MG-UNIT tablet  Continuous Blood Gluc  (DEXCOM G6 ) LUNA  Continuous Blood Gluc Sensor (DEXCOM G6 SENSOR) MISC  Continuous Blood Gluc Transmit (DEXCOM G6 TRANSMITTER) MISC  cyanocobalamin (VITAMIN B-12) 1000 MCG tablet  Ferrous Sulfate (IRON) 325 (65 FE) MG tablet  folic acid (FOLVITE) 1 MG tablet  furosemide (LASIX) 40 MG tablet  hydrOXYzine (ATARAX) 25 MG tablet  insulin aspart (NOVOLOG FLEXPEN) 100 UNIT/ML pen  insulin glargine (LANTUS PEN) 100 UNIT/ML pen  insulin pen needle (BD GRISEL U/F) 32G X 4 MM miscellaneous  lactulose (CHRONULAC) 10 GM/15ML solution  levothyroxine (SYNTHROID/LEVOTHROID) 200 MCG tablet  LORazepam (ATIVAN) 0.5 MG tablet  metoclopramide (REGLAN) 10 MG tablet  Multiple Vitamin (MULTIVITAMIN PO)  multivitamin CF formula (AQUADEKS) chewable tablet  omeprazole (PRILOSEC) 20 MG DR capsule  ondansetron (ZOFRAN-ODT) 4 MG ODT tab  potassium chloride ER (K-TAB) 20 MEQ CR tablet  rifampin (RIFADIN) 300 MG capsule  rifaximin (XIFAXAN) 550 MG TABS tablet  Rimegepant Sulfate 75 MG TBDP  simethicone (MYLICON) 80 MG chewable tablet  sitagliptin (JANUVIA) 100 MG tablet  spironolactone (ALDACTONE) 50 MG tablet  SUMAtriptan (IMITREX) 50 MG tablet  topiramate (TOPAMAX) 50 MG tablet  traZODone (DESYREL) 100 MG tablet  valACYclovir (VALTREX) 1000 mg tablet  venlafaxine (EFFEXOR) 75 MG tablet  vitamin A 3 MG (75239 UNITS) capsule  vitamin C (ASCORBIC ACID) 1000 MG TABS  vitamin D2 (ERGOCALCIFEROL) 39257 units (1250 mcg) capsule  vitamin D3 (CHOLECALCIFEROL) 50 mcg (2000 units) tablet  vitamin E (TOCOPHEROL) 400 units (180 mg) capsule      Allergies   Allergen  Reactions     Methylprednisolone Hives     Droperidol Anxiety     Nsaids Other (See Comments)     GI bleed.     Prednisone Anxiety, Hives and Rash     Family History  Family History   Problem Relation Age of Onset     Anesthesia Reaction Nephew         PONV     Bleeding Disorder No family hx of      Clotting Disorder No family hx of      Social History   Social History     Tobacco Use     Smoking status: Never Smoker     Smokeless tobacco: Never Used   Substance Use Topics     Alcohol use: Not Currently     Comment: Last drink was in 2017     Drug use: Never      Past medical history, past surgical history, medications, allergies, family history, and social history were reviewed with the patient. No additional pertinent items.       Review of Systems   Constitutional: Positive for fatigue. Negative for fever.   Cardiovascular: Positive for leg swelling.   Gastrointestinal: Positive for nausea.   Neurological: Positive for speech difficulty.     A complete review of systems was performed with pertinent positives and negatives noted in the HPI, and all other systems negative.    Physical Exam   BP: 106/51  Temp: 97.8  F (36.6  C)  Resp: 15  SpO2: 98 %  Physical Exam  Physical Exam   Constitutional: oriented to person, place, and time. appears well-developed and well-nourished.   HENT:   Head: Normocephalic and atraumatic.   Neck: Normal range of motion.   Pulmonary/Chest: Effort normal. No respiratory distress.   Cardiac: No murmurs, rubs, gallops. RRR.  Abdominal: Abdomen soft, nontender, nondistended. No rebound tenderness. No CVA TTP.  MSK: Long bones without deformity or evidence of trauma. Pitting edema to proximal shins bilaterally. No TTP over the calves. No erythema or color change to the legs.   Neurological: alert and oriented to person, place, and time.   Skin: Skin is warm and dry.   Psychiatric:  normal mood and affect.  behavior is normal. Thought content normal.     ED Course     12:27 AM  The patient  was seen and examined by Zac Vasquez MD in Room ED07.     Procedures            EKG Interpretation:      Interpreted by Jeff Vasquez MD  Time reviewed: 0035  Symptoms at time of EKG: weakness   Rhythm: normal sinus   Rate: Normal  Axis: Normal  Ectopy: none  Conduction: prolonged qtc  ST Segments/ T Waves: No acute ischemic changes  Q Waves: none  Comparison to prior: QTC prolonged compared to prior    Clinical Impression: Prolonged qtc, otherwise no ischemia/infarction      Results for orders placed or performed during the hospital encounter of 02/14/22   Comprehensive metabolic panel     Status: Abnormal (Preliminary result)   Result Value Ref Range    Sodium 138 133 - 144 mmol/L    Potassium 2.8 (L) 3.4 - 5.3 mmol/L    Chloride 100 94 - 109 mmol/L    Carbon Dioxide (CO2)      Anion Gap      Urea Nitrogen      Creatinine      Calcium      Glucose 142 (H) 70 - 99 mg/dL    Alkaline Phosphatase      AST      ALT      Protein Total      Albumin      Bilirubin Total      GFR Estimate     Ammonia     Status: Normal   Result Value Ref Range    Ammonia 47 10 - 50 umol/L   Nt probnp inpatient (BNP)     Status: Abnormal   Result Value Ref Range    N terminal Pro BNP Inpatient 653 (H) 0 - 450 pg/mL   INR     Status: Abnormal   Result Value Ref Range    INR 1.90 (H) 0.85 - 1.15   Troponin I     Status: Normal   Result Value Ref Range    Troponin I High Sensitivity 7 <54 ng/L   TSH with free T4 reflex     Status: Abnormal   Result Value Ref Range    TSH 0.06 (L) 0.40 - 4.00 mU/L   CBC with platelets and differential     Status: Abnormal   Result Value Ref Range    WBC Count 2.7 (L) 4.0 - 11.0 10e3/uL    RBC Count 2.43 (L) 3.80 - 5.20 10e6/uL    Hemoglobin 8.2 (L) 11.7 - 15.7 g/dL    Hematocrit 25.6 (L) 35.0 - 47.0 %     (H) 78 - 100 fL    MCH 33.7 (H) 26.5 - 33.0 pg    MCHC 32.0 31.5 - 36.5 g/dL    RDW 17.3 (H) 10.0 - 15.0 %    Platelet Count 97 (L) 150 - 450 10e3/uL    % Neutrophils 49 %    % Lymphocytes 32 %     % Monocytes 12 %    % Eosinophils 6 %    % Basophils 1 %    % Immature Granulocytes 0 %    NRBCs per 100 WBC 0 <1 /100    Absolute Neutrophils 1.4 (L) 1.6 - 8.3 10e3/uL    Absolute Lymphocytes 0.8 0.8 - 5.3 10e3/uL    Absolute Monocytes 0.3 0.0 - 1.3 10e3/uL    Absolute Eosinophils 0.2 0.0 - 0.7 10e3/uL    Absolute Basophils 0.0 0.0 - 0.2 10e3/uL    Absolute Immature Granulocytes 0.0 <=0.4 10e3/uL    Absolute NRBCs 0.0 10e3/uL   T4 free     Status: Abnormal   Result Value Ref Range    Free T4 2.26 (H) 0.76 - 1.46 ng/dL   CBC with platelets differential     Status: Abnormal    Narrative    The following orders were created for panel order CBC with platelets differential.  Procedure                               Abnormality         Status                     ---------                               -----------         ------                     CBC with platelets and d...[[id number]]  Abnormal            Final result                 Please view results for these tests on the individual orders.   Extra Tube     Status: None (In process)    Narrative    The following orders were created for panel order Extra Tube.  Procedure                               Abnormality         Status                     ---------                               -----------         ------                     Extra Purple Top Tube[[id number]]                            In process                   Please view results for these tests on the individual orders.                         No results found for any visits on 02/14/22.  Medications - No data to display     Assessments & Plan (with Medical Decision Making)   MDM  Patient presenting with leg swelling, fatigue.  She is found to have a prolonged QTC, hyperkalemia.  I am concerned she has low magnesium however lab cannot do it at this point and will take several hours.  She is presumptively given magnesium potassium for replacement and placed on telemetry.  She is given Lasix as well.  This is  bilateral and relatively chronic, very unlikely DVT so will defer ultrasound at this point.  She is otherwise appearing well without symptoms or signs of infection.  She has no abdominal pain or tenderness, very likely SBP.  Ammonia is normal and she does not exhibit any symptoms or signs of hepatic encephalopathy.  She does have quite a few sedating medications and may be related to that, she says Effexor does put her sleep and they recently increased dose.  Discussed following up with her primary care provider about this.  Patient will be sent to the observation unit for electrolyte replacement and repeat EKGs.    I have reviewed the nursing notes. I have reviewed the findings, diagnosis, plan and need for follow up with the patient.    New Prescriptions    No medications on file       Final diagnoses:   Hypokalemia   Hypomagnesemia   Prolonged Q-T interval on ECG     I, Satish Veloz, am serving as a trained medical scribe to document services personally performed by Zac Vasquez MD, based on the provider's statements to me.      I,  Zac Vasquez MD, was physically present and have reviewed and verified the accuracy of this note documented by Satish Veloz.     --  Zac Vasquez MD  [facility] EMERGENCY DEPARTMENT  2/14/2022     Jeff Vasquez MD  02/15/22 0250

## 2022-02-15 NOTE — H&P
[facility]    History and Physical - ED Observation       Date of Admission:  2/14/2022    Assessment & Plan      Susan Puckett is a 51 year old female admitted on 2/14/2022. She has a past medical history significant for type 2 diabetes, seizures, unspecified altered mental status, liver cirrhosis 2/2 to CUETO, pancytopenia, pleural effusion, Raynaud's phenomenon, gastric bypass, hepatic encephalopathy, and GEORGETTE who presents to the emergency department for evaluation of leg swelling and fatigue.    ##Hypokalemia  ##Hypomagnesemia  ##Prolonged QTc  Potassium 2.9, likely magnesium is low as well. Lab is unable to run magnesium levels tonight though, so patient was empirically given magnesium replacement in addition to potassium. EKG shows QTc 545. About one month ago, QTc was 516. Medications: Lasix 40mg IV x1, mag sulfate 2g IV x1, Oxycodone 5mg. Kayciel 40mEq oral. Potassium chloride 10mEq IV x1 attempted but stopped due to pt tolerance. Will attempt administration with concurrent saline for a total of 100ml saline. Given patients history of gastric bypass, likely oral absorption will be suboptimal.  Plan: [place] to ED Observation for correction of electrolyte imbalances and recheck of EKG.   -Further potassium replacement  -Repeat EKG when potassium level normal range  -Continue PTA potassium 40mEq BID, spironolactone, lasix  -BMP at 0700    ##CUETO s/p TIPS  ##Anemia, Chronic  ##Thrombocytopenia:   Follows with Dr Cook. Child Brown class B, MELD 17. Currently on transplant list, management per gastroenterologist. Pt has esophageal varices. Tonight,  reports that patient is intermittently confused and garbling speech. Pt and  report adherence to lactulose regimen with 4-5 BM daily. Hgb: 8.2, down nearly 2gm since 1/26/22. Platelets 97, stable. Albumin 1.8, Bilirubin 1.8. INR 1.9. Ammonia 47. , up from previous around 200. Patient has 1+ edema in  bilateral lower extremities. Patient very anxious about her current medical situation, significant other had panic attack while with patient in the ED.   -Hepatology consult  -Continue PTA rifaximin, lactulose    ##DVT of right axillary vein, acute  Patient is currently managed by [facility] thrombosis clinic and is taking a DOAC for 3 months. Platelets 97. Hgb was 10 on 1/26/22, now down to 8.2. INR 1.9.   -Continue PTA Eliquis    ##T2DM: A1c 5.9 on 10/20/21. Patient takes novolog, lantus, and Januvia at home.  -Continue PTA lantus and novolog  -POC glucose with meals and bedtime  - pt has a Dexcom CGM    ##Hypothyroid: TSH 0.06, Free T4 2.26. Pt follows with endocrine and recently had dose changed of levothyroxine.  -Continue PTA levothyroxine    ##HLD: Continue PTA statin         Diet:   moderate carb, sodium restructed  DVT Prophylaxis: DOAC  Rossi Catheter: Not present  Central Lines: None  Cardiac Monitoring: None  Code Status:   full    Clinically Significant Risk Factors Present on Admission        # Hypokalemia: K = 2.8 mmol/L (Ref range: 3.4 - 5.3 mmol/L) on admission, will replace as needed       # Coagulation Defect: INR = 1.90 (Ref range: 0.85 - 1.15) and/or PTT = N/A on admission, will monitor for bleeding  # Thrombocytopenia: Plts = 97 10e3/uL (Ref range: 150 - 450 10e3/uL) on admission, will monitor for bleeding       Disposition Plan   Expected Discharge: 1-2 days   Anticipated discharge location:  Awaiting care coordination huddle  Delays:     await hepatology consult and correction of electrolytes        The patient's care was discussed with the Bedside Nurse, Patient and ED MD, Dr Jer Vasquez.    Sonia Waterman, CNP  Hospitalist Service  [facility]  ED Observation, Ascom:[id number]  ______________________________________________________________________    Chief Complaint   Leg swelling ,fatigue    History is obtained from the patient    History  "of Present Illness   Per ED, \"Susan Puckett is a 51 year old female with a past medical history of type 2 diabetes, seizures, unspecified altered mental status, liver cirrhosis 2/2 to CUETO, pancytopenia, pleural effusion, Raynaud's phenomenon, gastric bypass, hepatic encephalopathy, and GEORGETTE who presents to the emergency department for evaluation of leg swelling and fatigue.  The patient presents to the ED with her .  She reports experiencing swelling from her \"toes to hip\" states that she has never experienced swelling with this severity. She does report experiencing edema at baseline due to liver complications.  Patient reports that she has been wearing zipper compression socks.   Patient states that she has been taking both Tylenol and oxycodone for pain (she had a recent fall in which she was prescribed oxycodone).  She states that the pain in her legs has been increasing over the last 3 to 4 days.  She reports attempting to alleviate pain by elevating her legs, after she did this tonight she could barely stand and needed assistance to walk.  She reports using a cane to walk at baseline.  She reports that her weight has been fluctuating \"up-and-down,\" currently, she reports an increase in weight. She reports being adherent and taking her diuretics and lactulose medications, she has not missed any doses recently. She reports that she follows a salt restricted diet.  Additionally, she states that she has been eating significantly less, therefore she would not be able to reach her salt intake limit.  reports that the patient has been having 4-5 bowel movements per day. She denies drinking alcohol recently. She reports experiencing nausea.  She states that she has been \"drooling a lot.\"  She denies experiencing fever, she states that she is presented to her PCP recently with no fever   reports that the patient has been experiencing confusion and slurring her words, this was happening earlier " "today.  He also reports that \"she does not make sense now and then.\"  Her  states that the patient was alert for approximately 2 to 3 hours in the morning, then by mid afternoon she becomes quite fatigued and began experiencing slurred speech and confusion.  She reports taking 100 mg of spironolactone and 40 mg of furosemide. Of note, she reports being on a \"different Effexor now.\"  She states that she received a prescription for Effexor on Friday but this \"knocks her out.\"  She states that she has not been taking it in the morning, she has been taking it at night but is unsure if this medication interacts with her trazodone and/or melatonin.\"    Review of Systems    Constitutional: Positive for fatigue. Negative for fever.   Cardiovascular: Positive for leg swelling.   Gastrointestinal: Positive for nausea.   Neurological: Positive for speech difficulty.      A complete review of systems was performed with pertinent positives and negatives noted in the HPI, and all other systems negative.    Past Medical History    I have reviewed this patient's medical history and updated it with pertinent information if needed.   Past Medical History:   Diagnosis Date     Anemia      Anxiety      Cold sore      Depressive disorder      Diabetes (H)     Type 2 DM/No Insulin      Encephalopathy      Esophageal varices without bleeding (H)     grade I     History of blood transfusion     10/2019     History of seizure     diabetic seizure     Insomnia      Liver cirrhosis secondary to CUETO (H) 05/28/2020     Migraine      Pleural effusion      Thrombocytopenia (H)      Thyroid disease        Past Surgical History   I have reviewed this patient's surgical history and updated it with pertinent information if needed.  Past Surgical History:   Procedure Laterality Date     APPENDECTOMY      1989     COLONOSCOPY      1/2020 at [facility]      ENT SURGERY  1998    tempanoplasty     GI SURGERY      EGD August 2020 at [facility]     GYN SURGERY  2010    laparoscopic ablation     IR TRANSVEN INTRAHEPATIC PORTOSYST SHUNT  11/5/2021     MAMMOPLASTY REDUCTION BILATERAL  2004     HI STAB PHELBECTOMY VARICOSE VEINS, LESS THAN 10 INCISIONS, ONE EXTREMITY  2020     RNY gastric bypass  01/2006    retoocolic, retrogastric, [facility]     TONSILLECTOMY & ADENOIDECTOMY Bilateral 1978     WISDOM TEETH EXTRACTION         Social History   I have reviewed this patient's social history and updated it with pertinent information if needed.  Social History     Tobacco Use     Smoking status: Never Smoker     Smokeless tobacco: Never Used   Substance Use Topics     Alcohol use: Not Currently     Comment: Last drink was in 2017     Drug use: Never       Family History   I have reviewed this patient's family history and updated it with pertinent information if needed.  Family History   Problem Relation Age of Onset     Anesthesia Reaction Nephew         PONV     Bleeding Disorder No family hx of      Clotting Disorder No family hx of        Prior to Admission Medications   Prior to Admission Medications   Prescriptions Last Dose Informant Patient Reported? Taking?   Continuous Blood Gluc  (DEXCOM G6 ) LUNA  Self Yes No   Sig: Use as directed for continuous glucose monitoring.   Continuous Blood Gluc Sensor (DEXCOM G6 SENSOR) MISC  Self Yes No   Sig: Use as directed for continuous glucose monitoring.  Change sensor every 10 days.   Continuous Blood Gluc Transmit (DEXCOM G6 TRANSMITTER) MISC  Self Yes No   Sig: Use as directed for continuous glucose monitoring.  Change every 3 months.   Ferrous Sulfate (IRON) 325 (65 FE) MG tablet  Self Yes No   Sig: Take 1 tablet by mouth every morning    LORazepam (ATIVAN) 0.5 MG tablet   No No   Sig: Take 1 tablet (0.5 mg) by mouth every 6 hours as needed for anxiety   Multiple Vitamin (MULTIVITAMIN PO)  Self Yes No   Sig: Take 2 tablets by mouth every morning    Rimegepant Sulfate 75 MG TBDP  Self Yes No    Sig: Take 75 mg by mouth   Patient not taking: Reported on 1/10/2022   SUMAtriptan (IMITREX) 50 MG tablet  Self Yes No   Sig: Take 50 mg by mouth at onset of headache for migraine    acetaminophen (TYLENOL) 500 MG tablet   No No   Sig: Take 1 tablet (500 mg) by mouth every 6 hours as needed for mild pain   acetone urine (KETOSTIX) test strip  Self Yes No   Si strip   albuterol (PROAIR HFA/PROVENTIL HFA/VENTOLIN HFA) 108 (90 Base) MCG/ACT inhaler  Self Yes No   Sig: Inhale 1-2 puffs into the lungs    Patient not taking: Reported on 2022   amylase-lipase-protease (CREON 6) 7283-56483-31082 units CPEP  Self Yes No   Sig: Take 3 capsules by mouth 3 times daily (with meals)   atorvastatin (LIPITOR) 20 MG tablet  Self Yes No   Sig: Take 20 mg by mouth every morning    calcium carbonate (TUMS) 500 MG chewable tablet  Self Yes No   Sig: Take 1 tablet by mouth daily as needed for heartburn    Patient not taking: Reported on 1/10/2022   calcium citrate-vitamin D (CITRACAL W/D) 250-100 MG-UNIT tablet  Self No No   Sig: Take 2 tablets by mouth 2 times daily (with meals)   Patient taking differently: Take 1 tablet by mouth 2 times daily (with meals)    cyanocobalamin (VITAMIN B-12) 1000 MCG tablet  Self Yes No   Sig: Take 1,000 mcg by mouth every 7 days    folic acid (FOLVITE) 1 MG tablet  Self Yes No   Sig: Take 1 mg by mouth every morning    furosemide (LASIX) 40 MG tablet   No No   Sig: Take 1 tablet (40 mg) by mouth daily   hydrOXYzine (ATARAX) 25 MG tablet  Self Yes No   Sig: Take 25 mg by mouth 3 times daily as needed for anxiety   insulin aspart (NOVOLOG FLEXPEN) 100 UNIT/ML pen  Self Yes No   Sig: Inject 1u/50>200 every 4 hours as needed for high blood glucose. Up to 20 units daily.  Indications: Diabetes Mellitus   insulin glargine (LANTUS PEN) 100 UNIT/ML pen   No No   Sig: Inject 8 Units Subcutaneous every morning Takes around 12noon   insulin pen needle (BD GRISEL U/F) 32G X 4 MM miscellaneous  Self Yes No    Sig: Inject 1 each Subcutaneous   lactulose (CHRONULAC) 10 GM/15ML solution  Self No No   Sig: TAKE 30 MLS BY MOUTH 3 TIMES DAILY   Patient taking differently: 3 times daily    levothyroxine (SYNTHROID/LEVOTHROID) 200 MCG tablet  Self Yes No   Sig: Take 200 mcg by mouth   metoclopramide (REGLAN) 10 MG tablet  Self Yes No   Sig: Take 10 mg by mouth   multivitamin CF formula (AQUADEKS) chewable tablet   No No   Sig: Take 1 tablet by mouth daily   omeprazole (PRILOSEC) 20 MG DR capsule  Self No No   Sig: Take 1 capsule (20 mg) by mouth daily   ondansetron (ZOFRAN-ODT) 4 MG ODT tab   No No   Sig: Place 1 tablet (4 mg) under the tongue every 6 hours as needed for nausea   potassium chloride ER (K-TAB) 20 MEQ CR tablet   No No   Sig: Take 2 tablets (40 mEq) by mouth 2 times daily   rifampin (RIFADIN) 300 MG capsule  Self No No   Sig: TAKE 1 CAPSULE BY MOUTH EVERY DAY   Patient not taking: Reported on 1/10/2022   rifaximin (XIFAXAN) 550 MG TABS tablet  Self No No   Sig: Take 1 tablet (550 mg) by mouth 2 times daily   simethicone (MYLICON) 80 MG chewable tablet  Self Yes No   Sig: Take 80 mg by mouth every 6 hours as needed for flatulence or cramping   Patient not taking: Reported on 1/10/2022   sitagliptin (JANUVIA) 100 MG tablet  Self Yes No   Sig: Take 100 mg by mouth every morning    spironolactone (ALDACTONE) 50 MG tablet   No No   Sig: Take 1 tablet (50 mg) by mouth daily   topiramate (TOPAMAX) 50 MG tablet  Self Yes No   Sig: Take 50 mg by mouth daily    traZODone (DESYREL) 100 MG tablet  Self Yes No   Sig: Take  mg by mouth nightly as needed for sleep   valACYclovir (VALTREX) 1000 mg tablet  Self Yes No   Sig: Take 1,000 mg by mouth daily as needed (at onset of cold sore)    venlafaxine (EFFEXOR) 75 MG tablet  Self No No   Sig: Take 1 tablet (75 mg) by mouth At Bedtime   vitamin A 3 MG (32657 UNITS) capsule  Self Yes No   Sig: Take 10,000 Units by mouth every morning    vitamin C (ASCORBIC ACID) 1000 MG TABS   Self Yes No   vitamin D2 (ERGOCALCIFEROL) 34706 units (1250 mcg) capsule   No No   Sig: Take 1 capsule (50,000 Units) by mouth once a week   vitamin D3 (CHOLECALCIFEROL) 50 mcg (2000 units) tablet  Self No No   Sig: Take 1 tablet (50 mcg) by mouth daily   vitamin E (TOCOPHEROL) 400 units (180 mg) capsule  Self Yes No   Sig: Take 400 Units by mouth every morning       Facility-Administered Medications: None     Allergies   Allergies   Allergen Reactions     Methylprednisolone Hives     Droperidol Anxiety     Nsaids Other (See Comments)     GI bleed.     Prednisone Anxiety, Hives and Rash       Physical Exam   Vital Signs: Temp: 97.8  F (36.6  C) Temp src: Oral BP: 116/60 Pulse: 63   Resp: 18 SpO2: 100 %      Weight: 0 lbs 0 oz    Constitutional: awake, alert, cooperative, no apparent distress, and appears stated age  Eyes: Lids and lashes normal, pupils equal, round and reactive to light, extra ocular muscles intact, sclera clear, conjunctiva normal  ENT: Normocephalic, atraumatic, external ears without lesions, oral pharynx with moist mucous membranes,  gums normal and good dentition.  Respiratory: No increased work of breathing, good air exchange, clear to auscultation bilaterally, no crackles or wheezing  Cardiovascular: Normal apical impulse, regular rate and rhythm, normal S1 and S2, no S3 or S4, and no murmur noted  Abdomen: Normal bowel sounds, soft, slightly distended, mildly tender  Skin: normal skin color, texture, turgor and no redness, warmth, or swelling  Musculoskeletal: There is pitting edema from below the knee down to the ankles, 1+. Full range motion noted.    Neurologic: Awake, alert, oriented to name, place and time.  Cranial nerves II-XII are grossly intact.  Motor is 5 out of 5 bilaterally.  Cerebellar finger to nose, heel to shin intact.  Sensory is intact.  Babinski down going, Romberg negative, and gait is normal.    Data   Data reviewed today: I reviewed all medications, new labs and  imaging results over the last 24 hours. I personally reviewed the EKG tracing showing NSR, prolonged QTc.    EKG  Interpreted by Jeff Vasquez MD  Time reviewed: 0035  Symptoms at time of EKG: weakness   Rhythm: normal sinus   Rate: Normal  Axis: Normal  Ectopy: none  Conduction: prolonged qtc  ST Segments/ T Waves: No acute ischemic changes  Q Waves: none  Comparison to prior: QTC prolonged compared to prior     Clinical Impression: Prolonged qtc, otherwise no ischemia/infarction    Recent Labs   Lab 02/15/22  0118 02/15/22  0049   WBC  --  2.7*   HGB  --  8.2*   MCV  --  105*   PLT  --  97*   INR 1.90*  --      --    POTASSIUM 2.9*  --    CHLORIDE 102  --    CO2 30  --    BUN 6*  --    CR 1.09*  --    ANIONGAP 8  --    MAURI 7.9*  --    *  --    ALBUMIN 1.8*  --    PROTTOTAL 5.5*  --    BILITOTAL 1.8*  --    ALKPHOS 161*  --    ALT 27  --    AST 58*  --      No results found for this or any previous visit (from the past 24 hour(s)).

## 2022-02-15 NOTE — CONSULTS
Triage and Transition - Consult and Liaison     Susan Puckett  February 15, 2022    Psychiatry consult acknowledged. Consult unable to be completed due to no answer from patient with two attempts to complete consult today. Please re-consult when patient is alert and able to participate in assessment.     RODDY SHAW NATHAN  Triage and Transition - Consult and Liaison   [phone]

## 2022-02-15 NOTE — PROGRESS NOTES
11:12 AM The patient was seen and examined by me and the case was discussed with the ANYA. We are in agreement with the assessment and plan.  Past Medical History:   Diagnosis Date     Anemia      Anxiety      Cold sore      Depressive disorder      Diabetes (H)     Type 2 DM/No Insulin      Encephalopathy      Esophageal varices without bleeding (H)     grade I     History of blood transfusion     10/2019     History of seizure     diabetic seizure     Insomnia      Liver cirrhosis secondary to CUETO (H) 05/28/2020     Migraine      Pleural effusion      Thrombocytopenia (H)      Thyroid disease      Social History     Socioeconomic History     Marital status:      Spouse name: Not on file     Number of children: Not on file     Years of education: Not on file     Highest education level: Bachelor's degree (e.g., BA, AB, BS)   Occupational History     Not on file   Tobacco Use     Smoking status: Never Smoker     Smokeless tobacco: Never Used   Substance and Sexual Activity     Alcohol use: Not Currently     Comment: Last drink was in 2017     Drug use: Never     Sexual activity: Not on file   Other Topics Concern     Parent/sibling w/ CABG, MI or angioplasty before 65F 55M? Not Asked   Social History Narrative     Not on file     Social Determinants of Health     Financial Resource Strain: Not on file   Food Insecurity: No Food Insecurity     Worried About Running Out of Food in the Last Year: Never true     Ran Out of Food in the Last Year: Never true   Transportation Needs: No Transportation Needs     Lack of Transportation (Medical): No     Lack of Transportation (Non-Medical): No   Physical Activity: Not on file   Stress: Not on file   Social Connections: Not on file   Intimate Partner Violence: Not on file   Housing Stability: Not on file     This is a 51-year-old female with complex history including diabetes Cueto cirrhosis pancytopenia hepatic encephalopathy.  She comes to the emergency department for  evaluation of leg swelling and fatigue and was admitted to observation she had a slightly low potassium which was replaced no source of infection was found she was seen by gastroenterology who requested several tests be done and they felt she would be appropriate for inpatient and for this reason is being transferred to internal medicine.

## 2022-02-15 NOTE — CONSULTS
Triage and Transition - Consult and Liaison     Susan Puckett  February 15, 2022      Psychiatry consult acknowledged. Attempted call to Ms. Puckett's room at 1610. There was no answer and call timed out per voice recording. Writer will attempt again at 1645 to see patient.     RODDY SHAW, [facility]  Triage and Transition - Consult and Liaison   [phone]

## 2022-02-15 NOTE — PHARMACY-ADMISSION MEDICATION HISTORY
Admission Medication History Completed by Pharmacy    See [facility] Admission Navigator for allergy information, preferred outpatient pharmacy, prior to admission medications and immunization status.     Medication History Sources:     Patient's spouse, Roly Dunham was a fair historian. He stated that he manages her medication box himself at home.    Dispense history    [place] PDMP report    Changes made to PTA medication list (reason):    Added:   o Levothyroxine 175 mcg tablet daily  o Famotidine 20 mg tablet daily  o Calcium citrate 600 mg tablet twice daily  o Apixaban 5 mg tablet twice daily  o Melatonin 5 mg tablet every night at bedtime  o Ferrous gluconate 324 mg tablet daily    Deleted:   o Albuterol HFA inhaler- per , patient has never used this  o Creon 6 capsule - not taking, per   o Calcium carbonate 500 mg tablet - not taking  o Calcium citrate-vitamin D 250-100 mg tablet - not taking   o Ferrous sulfate 325 mg tablet - taking ferrous gluconate instead  o Levothyroxine 200 mcg tablet - dose change to 175 mcg tablet  o Metoclopramide 10 mg tablet - not taking  o Multivitamin tablet - taking chewable tablets instead  o Rifampin 300 mg capsule - taking rifaximin 550 mg instead  o Simethicone 80 mg tablet - not taking   o Cholecalciferol 2000 unit tablet daily - taking weekly replacement dose instead    Changed:   o Atorvastatin 20 mg tablet changed from daily in the morning to daily in the evening  o Insulin glargine changed from 4 units daily to 5 units daily   o Multivitamin CF formula changed from 1 tablet daily to 2 tablet daily  o Sumatriptan dose changed from 50 mg to 100 mg PRN migraine  o Topiramate 50 mg tablet changed from daily PRN to daily scheduled    Additional Information:    Reviewed allergy list with patient and her - no new updates    Per , Damian Dunham sets up patient's medication box each week. He sets up her daily medications but is unsure of how frequently she  takes all of her PRN medications.     There was confusion regarding the dose of levothyroxine the patient is supposed to be taking. Per chart review, patient's dose was decreased from 250 mcg daily to 175 mcg daily on 22. However, patient's  reported she has still been taking the 250 mcg daily. Unclear if she has been taking both doses.     There was confusion regarding the dose of venlafaxine. Patient's  reported that the patient still takes venlafaxine 75 mg tablet nightly at bedtime, but patient also has been taking 100 mg twice daily per chart review. Unclear if patient has been taking both doses.     Patient replaces Dexcom 6 glucose meter on , per . They  every 10 days, however. She is currently wearing it and her  expects it will  at the end of this week.     Patient takes once weekly vitamin D and vitamin B12 doses on . She will be due for the next doses on 22.     Per , patient also has orders for prochlorperazine 10 mg tablet (every 6 hours PRN), but she uses the ondansetron when she is feeling nauseated. She reportedly takes a dose every time she eats. Of note, patient's QTc has been prolonged and ondansetron can cause this.     Per , patient has never taken a dose of rimepegant 75 mg tablet, but the name sounded familiar to him. He was not sure if she was taking it or not.     Patient's  was unsure of the indication of topiramate. She had one seizure previously that was caused by hypoglycemia.     Patient's  said that about 2.5 weeks ago, he stopped putting lorazepam into her medication box because he noted that she was becoming overly sedated. He also thought that he had stopped her hydroxyzine at this time as well (for the same reason), but realized on 22 that he had accidentally still be putting it in her medication box, so he stopped it at that time.     Prior to Admission medications    Medication Sig  Last Dose Taking? Auth Provider   acetaminophen (TYLENOL) 500 MG tablet Take 1 tablet (500 mg) by mouth every 6 hours as needed for mild pain Unknown at Unknown time Yes Lucho Claudioofe Jermainofe   apixaban ANTICOAGULANT (ELIQUIS) 5 MG tablet Take 5 mg by mouth 2 times daily 2/14/2022 at 0900 Yes Unknown, Entered By History   CALCIUM CITRATE PO Take 600 mg by mouth 2 times daily 2/14/2022 at AM Yes Unknown, Entered By History   cyanocobalamin (VITAMIN B-12) 1000 MCG tablet Take 1,000 mcg by mouth every 7 days Take 1 tablet by mouth every 7 days on Wednesdays 2/9/2022 at AM Yes Reported, Patient   famotidine (PEPCID) 20 MG tablet Take 20 mg by mouth every morning (before breakfast) 2/14/2022 at AM Yes Unknown, Entered By History   Ferrous Gluconate 324 (37.5 Fe) MG TABS Take 1 tablet by mouth daily 2/14/2022 at AM Yes Unknown, Entered By History   folic acid (FOLVITE) 1 MG tablet Take 1 mg by mouth every morning  2/14/2022 at AM Yes Reported, Patient   furosemide (LASIX) 40 MG tablet Take 1 tablet (40 mg) by mouth daily 2/14/2022 at AM Yes Raphael Cook MD   insulin aspart (NOVOLOG FLEXPEN) 100 UNIT/ML pen Inject 1u/50>200 every 4 hours as needed for high blood glucose. Up to 20 units daily.  Indications: Diabetes Mellitus Unknown at Unknown time Yes Reported, Patient   lactulose (CHRONULAC) 10 GM/15ML solution TAKE 30 MLS BY MOUTH 3 TIMES DAILY  Patient taking differently: 3 times daily  2/14/2022 at 1200 Yes Raphael Cook MD   levothyroxine (SYNTHROID/LEVOTHROID) 175 MCG tablet Take 175 mcg by mouth daily 2/14/2022 at AM Yes Unknown, Entered By History   LORazepam (ATIVAN) 0.5 MG tablet Take 1 tablet (0.5 mg) by mouth every 6 hours as needed for anxiety Past Month at Unknown time Yes Renzo Zuñiga,    melatonin 5 MG tablet Take 5 mg by mouth At Bedtime 2/13/2022 at PM Yes Unknown, Entered By History   multivitamin CF formula (AQUADEKS) chewable tablet Take 1 tablet by mouth daily  Patient taking differently: Take 2  tablets by mouth daily  2/14/2022 at AM Yes Raphael Cook MD   omeprazole (PRILOSEC OTC) 20 MG EC tablet Take 20 mg by mouth daily 2/14/2022 at AM Yes Reported, Patient   ondansetron (ZOFRAN-ODT) 4 MG ODT tab Place 1 tablet (4 mg) under the tongue every 6 hours as needed for nausea Unknown at Unknown time Yes Lucho, Sis Alyofe   oxyCODONE IR (ROXICODONE) 10 MG tablet Take 10 mg by mouth every 4 hours as needed for severe pain (for pain at bedtime) Unknown at Unknown time Yes Reported, Patient   potassium chloride ER (K-TAB) 20 MEQ CR tablet Take 2 tablets (40 mEq) by mouth 2 times daily 2/14/2022 at AM Yes Raphael Cook MD   rifaximin (XIFAXAN) 550 MG TABS tablet Take 1 tablet (550 mg) by mouth 2 times daily 2/14/2022 at AM Yes Raphael Cook MD   sitagliptin (JANUVIA) 100 MG tablet Take 100 mg by mouth every morning  2/14/2022 at AM Yes Elham Cleary DO   spironolactone (ALDACTONE) 50 MG tablet Take 1 tablet (50 mg) by mouth daily 2/14/2022 at AM Yes Raphael Cook MD   SUMAtriptan (IMITREX) 50 MG tablet Take 100 mg by mouth at onset of headache for migraine  More than a month at Unknown time Yes Reported, Patient   topiramate (TOPAMAX) 50 MG tablet Take 50 mg by mouth daily  2/14/2022 at AM Yes Reported, Patient   valACYclovir (VALTREX) 1000 mg tablet Take 1,000 mg by mouth daily as needed (at onset of cold sore)  More than a month at Unknown time Yes Reported, Patient   vitamin A 3 MG (27571 UNITS) capsule Take 10,000 Units by mouth every morning  2/14/2022 at AM Yes Reported, Patient   vitamin C (ASCORBIC ACID) 1000 MG TABS 1,000 mg daily  2/14/2022 at AM Yes Reported, Patient   vitamin D2 (ERGOCALCIFEROL) 25861 units (1250 mcg) capsule Take 1 capsule (50,000 Units) by mouth once a week 2/9/2022 at AM Yes Raphael Cook MD   vitamin E (TOCOPHEROL) 400 units (180 mg) capsule Take 400 Units by mouth every morning  2/14/2022 at AM Yes Reported, Patient   acetone urine (KETOSTIX) test strip 1 strip   Reported, Patient    atorvastatin (LIPITOR) 20 MG tablet Take 20 mg by mouth every evening  2/13/2022 at PM  Reported, Patient   Continuous Blood Gluc  (DEXCOM G6 ) LUNA Use as directed for continuous glucose monitoring.   Reported, Patient   Continuous Blood Gluc Sensor (DEXCOM G6 SENSOR) MISC Use as directed for continuous glucose monitoring.  Change sensor every 10 days. 2/11/2022  Reported, Patient   Continuous Blood Gluc Transmit (DEXCOM G6 TRANSMITTER) MISC Use as directed for continuous glucose monitoring.  Change every 3 months.   Reported, Patient   hydrOXYzine (ATARAX) 25 MG tablet Take 25 mg by mouth 3 times daily as needed for anxiety 2/13/2022 at AM  Reported, Patient   insulin glargine (LANTUS PEN) 100 UNIT/ML pen Inject 8 Units Subcutaneous every morning Takes around 12noon  Patient taking differently: Inject 5 Units Subcutaneous daily Takes around 12noon 2/14/2022 at 1200  Mil Thorne MD   insulin pen needle (BD GRISEL U/F) 32G X 4 MM miscellaneous Inject 1 each Subcutaneous   Reported, Patient   Rimegepant Sulfate 75 MG TBDP Take 75 mg by mouth    Reported, Patient   traZODone (DESYREL) 100 MG tablet Take 100 mg by mouth At Bedtime  2/13/2022 at PM  Reported, Patient   venlafaxine (EFFEXOR) 75 MG tablet Take 1 tablet (75 mg) by mouth At Bedtime  Patient taking differently: Take 100 mg by mouth 2 times daily    Ozzie Augustin MD       Date completed: 02/15/22    Medication history completed by: Kandy Sanders [facility]

## 2022-02-15 NOTE — PROGRESS NOTES
" Observation goals  PRIOR TO DISCHARGE        Comments: -diagnostic tests and consults completed and resulted: Not met    -vital signs normal or at patient baseline: Met, /57 (BP Location: Left arm)   Pulse 79   Temp 98  F (36.7  C) (Oral)   Resp 16   Ht 1.702 m (5' 7\")   Wt 73.7 kg (162 lb 7.7 oz)   SpO2 100%   BMI 25.45 kg/m       -adequate pain control on oral analgesics: Met, denies pain.    -safe disposition plan has been identified: Pending.    -Potassium replaced to normal limits: Met, last Potassium 4.5     -Magnesium level replaced:Yes    US of abdomen and lower ext to be done  Nurse to notify provider when observation goals have been met and patient is ready for discharge.          "

## 2022-02-15 NOTE — PLAN OF CARE
SHIFT [id number]    Cardio: Prolonged QT: denies chest pain  Resp: no resp distress noted  Neuro: AOX4  Pain: 4/10 at IV site, Tylenol 325mg given: Atarax 25mg PRN given:  burning sensation during Potassium infusion: found out IV was out and ordered for Vascu team: Updated ANYA in Obs  VS: /60   Pulse 63   Temp 97.8  F (36.6  C) (Oral)   Resp 18   SpO2 100%     Given the report to [facility] in OBS Unit: All needs attended; Kept comfortable  Pt is anxious regarding treatment in Obs will be, ANYA informed; well explained and have appointment in [facility] this morning

## 2022-02-15 NOTE — PROGRESS NOTES
[facility]    Transfer Acceptance Note - Medicine Service, CONSTANTIN TEAM 4       Date of Admission: 2/14/2022    Assessment & Plan        Ms. 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 Hemodynamically, VS WNLs. Clinically, the patient is highly anxious.    Hypersomnolence  DDx includes acute-on-chronic hepatic encephalopathy (although ammonia negative at 47 umol/L on 02/15/2022 at 1 AM); concussion and/or intracranial bleed after reported fall/head injury on 01/31/2022 (head CT at OSH negative for a bleed at the time, although patient high risk given her ongoing anticoagulation); infection (although no localizing evidence of one; no leukocytosis; and procalcitonin negative); uncontrolled depression/anxiety (especially given anxiety apparent on exam); or polypharmacy.  - s/p abdominal ultrasound (WITHOUT Doppler) on 02/15/2022 - no ascites  - Ultrasound with TIPS doppler  - Non-contrast head CT  - Continue to follow blood culture collected on 02/15/2022 at 9 AM.  - Psychiatry consulted; appreciate team's assistance.    Leg swelling, bilateral  Leg pain, bilateral  - s/p bilateral lower extremity ultrasound on 02/15/2022 - negative for DVT  - s/p IV furosemide 40 mg x1 dose on 02/15/2022 at 3:30 AM (as above)  - s/p PO furosemide 40 mg x1 dose on 02/15/2022 at 9:30 AM (as above)  - s/p PO oxycodone 5 mg on 02/15/2022 at 1 AM  - IV furosemide 40 mg x1 dose  - Plan to resume home PO furosemide 40 mg daily in the AM (as above).  - Continue to monitor.  - Start pregabalin 25 mg daily.    CUETO cirrhosis  Anemia, macrocytic, chronic  Thrombocytopenia, chronic  Patient follows with Dr. Cook. MELD-Na score 19 (conferring 3-4% 90-day mortality risk). The patient is currently on the transplant list.  Patient and  report she is adherent to lactulose/rifaximin with ~4 bowel movements/day.  - Hepatology consulted and following; appreciate team's assistance.  - s/p abdominal ultrasound on 02/15/2022  - s/p IV furosemide 40 mg x1 dose on 02/15/2022 at 3:30 AM  - s/p PO furosemide 40 mg x1 dose on 02/15/2022 at 9:30 AM  - IV furosemide 40 mg x1 dose (as above)  - Plan to resume home PO furosemide 40 mg daily in the AM (as above).  - Home lactulose 30 mL TID  - Home rifaximin 550 mg BID  - Home spironolactone 50 mg daily    Hypokalemia, resolved  Potassium 4.5 on 02/15/2022 at 6:30 AM (up from 2.9 on 02/15/2022 at 1:30 AM).  - s/p PO potassium chloride 40 mEq x2 doses on 02/15/2022 at 3:30 AM and 4:45 AM  - s/p continuous infusion of IV potassium chloride 10 mEq/hr on 02/15/2022 3:30 AM - 7 AM (stopped due to burning)  - Home potassium chloride 40 mEq BID  - AM BMP    Hypomagnesemia  Magnesium 1.6 mg/dL on 02/15/2022 at 1 AM.  - s/p IV magnesium sulfate 2 g x1 dose  - AM magnesium    Long QT  QTc 545 ms by EKG on 02/15/2022 at 12:30 AM. QTc 495 ms on 02/15/2022 at 9 AM. Note: QTc 516 ms by EKG on most recent EKG from 01/27/2022.     Weakness  - Consulting physical therapy; appreciate therapist's assistance.  - Consulting occupational therapy; appreciate therapist's assistance.    -- CHRONIC MEDICAL PROBLEMS --    DVT, right axillary vein, acute  Patient is managed by [facility] thrombosis clinic. She is a on a 3-month course of apixaban.  - Home apixaban 5 mg BID    Type 2 diabetes mellitus  HgbA1c 5.9% on 10/20/2021. Patient has a Dexcom CGM.  - Hold home sitaglitpin for now.  - Home insulin glargine 4 units nightly  - Medium-dose insulin aspart correction scale TID with meals and at bedtime  - Fingerstick glucose TID with meals and at bedtime  - Hypoglycemia protocol     Anxiety/Depression  - Resume previously prescribed venlafaxine 150 mg daily (from 100 mg BID recently ordered on Friday  "02/11/2022).  - PO hydroxyzine 25-50 mg Q6H PRN  - Psychiatry consulted; appreciate team's assistance (as above)    Insomnia  - Hold home trazodone 100 mg nightly PRN.    Hypothyroidism  TSH 0.06, Free T4 elevated to 2.3. Pt follows with endocrine and recently had dose changed of levothyroxine.  - Home levothyroxine 175 mcg daily     Hyperlipidemia  - Home atorvastatin 20 mg daily       Diet: Combination Diet Moderate Consistent Carb (60 g CHO per Meal) Diet; 2 gm NA Diet    DVT Prophylaxis: Home apixaban  Rossi Catheter: Not present  Fluids: PO  Central Lines: None  Cardiac Monitoring: ACTIVE order. Indication: QTc prolonging medication (48 hours)  Code Status: Full Code      Disposition Plan   Expected Discharge:    Anticipated discharge location:  Awaiting care coordination huddle  Delays:            The patient's care was discussed with the Attending Physician, Dr. Jaylene Yepez.    Porfirio Mosley MD  Medicine Service, [address]     Securely message with the Vocera Web Console (learn more here)  Text page via [employer] Paging/Directory   Please see signed in provider for up to date coverage information    Clinically Significant Risk Factors Present on Admission              # Coagulation Defect: INR = 1.90 (Ref range: 0.85 - 1.15) and/or PTT = N/A on admission, will monitor for bleeding  # Thrombocytopenia: Plts = 97 10e3/uL (Ref range: 150 - 450 10e3/uL) on admission, will monitor for bleeding   # Overweight: Estimated body mass index is 25.45 kg/m  as calculated from the following:    Height as of this encounter: 1.702 m (5' 7\").    Weight as of this encounter: 73.7 kg (162 lb 7.7 oz).    ______________________________________________________________________    Interval History   - Patient admitted to observation over night 02/14/2022-02/15/2022; see the chart for more details re: her presentation and hospitalization thus far.  - She was reportedly " "admitted for chief complaints of leg swelling and fatigue.    - Per conversation with the patient she indeed complains of primarily bilateral leg swelling and pain that started ~1 week ago.  - She describes the leg swelling as entirely new for her and describes the leg pain as bilateral, symmetric, and involving the entire extent of both legs.  - She reports massage and elevation alleviate the leg pain.  - Since onset of her leg pain, she additionally complains of lethargy, nausea, and slurred speech.  - She became tearful throughout our interview, (1) lamenting her inability to live a normal life and (2) fearing others assuming she has not been taking her home medications namely the lactulose and rifaximin.  - She has additionally been taking polyethylene glycol.  - Patient reports that she has been more sleepy since the dose of her venlafaxine was increased.   - Patient also reports some nausea, vomiting, and poor appetite last week.  - She had planned to travel to [place] yesterday or today but was unable given her current health.    - Writer spoke with the patient's  (Roly, phone [phone]) for collateral information.  - He similarly reports, \"Lately she's been more tired. She has a little spunk of energy to do things to start out each day. She's pretty/somewhat energetic until mid afternoon. But if we run to [employer] or go out to eat, [for example], she gets tired very easily. She then sleeps and sleep and sleeps because she's so exhausted. Some days she has no energy, and she sleeps a lot.\"  -  reports the patient's hypersomnolence has been ongoing for ~2 weeks.  - He attributes her hypersomnolence to her being \"over-medicated.\"  - She had reportedly previously been taking 2 different anxiety pills: hydroxyzine (twice daily) and alprazolam (once daily).  -  stopped giving the patient alprazolam ~2 weeks ago but mistakenly has continued to give her hydroxyzine.  -  also reports the " "patient's blood sugars will drop during periods where she is asleep; he reports the lowest blood glucose level has been 64 mg/dL.  - She has additionally had poor appetite or nausea.  - In addition, \"she slurs her words\" or \"stretches her words out,\" which makes her \"[sound] like a drunk person.\"  -  also thinks she did sound a \"little bit confused last night.\"  - The patient has been complaining of her legs \"hurting really bad,\" specifically \"burning,\" which has never happened before.  - The  has been needing to help the patient put on pants for this reason.  - For the past 3-4 weeks, she has also been constantly drooling.    - When asked if the patient seemed depressed, the patient's  noted several life stressors.  - The patient's oldest nephew is disowning his family; she has some conflict with at least 1 of her 2 daughters; she and  have also had some conflict as a result.  - The  reports, \"I try to calm her down, but once she's on a roll she just keeps on and doesn't know how to let go.\"  - He reports the patient has been experiencing more mood swings as of late, since a liver transplant appointment ~1 month ago which \"put her head in a spin.\"  - The patient was also reportedly upset after a visit with her PCP (Dr. Harleen Billy) this past Friday where hospice was reportedly discussed.  - \"The more I think about it, the more I think it would be beneficial for her to get [hospice].\"  - \"I think it would help not only her, but it will help me out because it's been too much stress on me. I try to do the best I can to help her out. Some days she can be really nice, and we get along, but then [the next day] she can get really pereyra.\"    - The patient's  notes the patient sustained a fall in the bathtub (hitting her head and right arm/back) ~2 weeks ago; per chart review, the patient was seen/evaluated at [facility] on 01/31/2022 where she had CT imaging of the head, " chest, abdomen, and pelvis, all of which were unremarkable.    Data reviewed today: I reviewed all medications, new labs, and imaging results over the last 24 hours.    Physical Exam   Vital Signs: Temp: 97.4  F (36.3  C) Temp src: Oral BP: 104/70 Pulse: 91   Resp: 16 SpO2: 100 % O2 Device: None (Room air)    Weight: 162 lbs 7.66 oz     General: tired-appearing woman sitting asleep but easily woken by voice in no acute distress; intermittently anxious and tearful throughout interview  HEENT: infraorbital edema and hyperpigmentation  CV: regular rate/rhythm; no murmurs/rubs/gallops/clicks  Pulm: normal work of breathing on room air; clear to auscultation throughout  Abd: soft, non-tender, non-distended  Ext: pitting edema in bilateral lower extremities up to shins  Neuro: initially and intermittently asleep during interview; clear speech; mild asterixis in bilateral hands  Skin: mildly jaundiced throughout    Data   Recent Labs   Lab 02/15/22  1321 02/15/22  0959 02/15/22  0636 02/15/22  0118 02/15/22  0118 02/15/22  0049   WBC  --   --   --   --   --  2.7*   HGB  --   --   --   --   --  8.2*   MCV  --   --   --   --   --  105*   PLT  --   --   --   --   --  97*   INR  --   --   --   --  1.90*  --    NA  --   --  137  --  140  --    POTASSIUM  --   --  4.5  --  2.9*  --    CHLORIDE  --   --  106  --  102  --    CO2  --   --  25  --  30  --    BUN  --   --  6*  --  6*  --    CR  --   --  0.99  --  1.09*  --    ANIONGAP  --   --  6  --  8  --    MAURI  --   --  8.1*  --  7.9*  --    * 118*  118* 142*   < > 146*  --    ALBUMIN  --   --   --   --  1.8*  --    PROTTOTAL  --   --   --   --  5.5*  --    BILITOTAL  --   --   --   --  1.8*  --    ALKPHOS  --   --   --   --  161*  --    ALT  --   --   --   --  27  --    AST  --   --   --   --  58*  --     < > = values in this interval not displayed.     Recent Results (from the past 24 hour(s))    Lower Extremity Venous Duplex Bilateral    Narrative    EXAMINATION:  DOPPLER VENOUS ULTRASOUND OF BILATERAL LOWER EXTREMITIES,  2/15/2022 10:13 AM     COMPARISON: Lower extremity venous ultrasound 12/26/2021    HISTORY: Rule out DVT    TECHNIQUE:  Gray-scale evaluation with compression, spectral flow and  color Doppler assessment of the deep venous system of both legs from  groin to knee, and then at the ankles.    FINDINGS:  In both lower extremities, the common femoral, femoral, popliteal and  posterior tibial veins demonstrate normal compressibility and blood  flow.      Impression    IMPRESSION:  1.  No evidence of deep venous thrombosis in either lower extremity.    I have personally reviewed the examination and initial interpretation  and I agree with the findings.    JORGE LOU MD         SYSTEM ID:  [id number]   US Abdomen Limited    Narrative    EXAMINATION: US ABDOMEN LIMITED  2/15/2022 10:13 AM      CLINICAL HISTORY: assess acities    COMPARISON: Abdominal ultrasound 12/27/2021, CT abdomen and pelvis  1/27/2021    FINDINGS:  No ascites visualized in all 4 quadrants.       Impression    IMPRESSION:   No ascites visualized.    I have personally reviewed the examination and initial interpretation  and I agree with the findings.    JORGE LOU MD         SYSTEM ID:  [id number]

## 2022-02-16 ENCOUNTER — APPOINTMENT (OUTPATIENT)
Dept: ULTRASOUND IMAGING | Facility: CLINIC | Age: 52
DRG: 441 | End: 2022-02-16
Payer: COMMERCIAL

## 2022-02-16 ENCOUNTER — APPOINTMENT (OUTPATIENT)
Dept: PHYSICAL THERAPY | Facility: CLINIC | Age: 52
DRG: 441 | End: 2022-02-16
Payer: COMMERCIAL

## 2022-02-16 ENCOUNTER — APPOINTMENT (OUTPATIENT)
Dept: OCCUPATIONAL THERAPY | Facility: CLINIC | Age: 52
DRG: 441 | End: 2022-02-16
Payer: COMMERCIAL

## 2022-02-16 LAB
ALBUMIN SERPL-MCNC: 1.8 G/DL (ref 3.4–5)
ALP SERPL-CCNC: 144 U/L (ref 40–150)
ALT SERPL W P-5'-P-CCNC: 23 U/L (ref 0–50)
ANION GAP SERPL CALCULATED.3IONS-SCNC: 3 MMOL/L (ref 3–14)
ANION GAP SERPL CALCULATED.3IONS-SCNC: 4 MMOL/L (ref 3–14)
AST SERPL W P-5'-P-CCNC: 51 U/L (ref 0–45)
ATRIAL RATE - MUSE: 79 BPM
BASOPHILS # BLD AUTO: 0 10E3/UL (ref 0–0.2)
BASOPHILS NFR BLD AUTO: 1 %
BILIRUB SERPL-MCNC: 1.8 MG/DL (ref 0.2–1.3)
BUN SERPL-MCNC: 8 MG/DL (ref 7–30)
BUN SERPL-MCNC: 9 MG/DL (ref 7–30)
CALCIUM SERPL-MCNC: 7.6 MG/DL (ref 8.5–10.1)
CALCIUM SERPL-MCNC: 7.7 MG/DL (ref 8.5–10.1)
CHLORIDE BLD-SCNC: 107 MMOL/L (ref 94–109)
CHLORIDE BLD-SCNC: 107 MMOL/L (ref 94–109)
CO2 SERPL-SCNC: 27 MMOL/L (ref 20–32)
CO2 SERPL-SCNC: 28 MMOL/L (ref 20–32)
CREAT SERPL-MCNC: 1 MG/DL (ref 0.52–1.04)
CREAT SERPL-MCNC: 1.03 MG/DL (ref 0.52–1.04)
DIASTOLIC BLOOD PRESSURE - MUSE: NORMAL MMHG
EOSINOPHIL # BLD AUTO: 0.1 10E3/UL (ref 0–0.7)
EOSINOPHIL NFR BLD AUTO: 6 %
ERYTHROCYTE [DISTWIDTH] IN BLOOD BY AUTOMATED COUNT: 17 % (ref 10–15)
GFR SERPL CREATININE-BSD FRML MDRD: 66 ML/MIN/1.73M2
GFR SERPL CREATININE-BSD FRML MDRD: 69 ML/MIN/1.73M2
GLUCOSE BLD-MCNC: 118 MG/DL (ref 70–99)
GLUCOSE BLD-MCNC: 118 MG/DL (ref 70–99)
GLUCOSE BLDC GLUCOMTR-MCNC: 116 MG/DL (ref 70–99)
GLUCOSE BLDC GLUCOMTR-MCNC: 139 MG/DL (ref 70–99)
GLUCOSE BLDC GLUCOMTR-MCNC: 199 MG/DL (ref 70–99)
GLUCOSE BLDC GLUCOMTR-MCNC: 201 MG/DL (ref 70–99)
GLUCOSE BLDC GLUCOMTR-MCNC: 273 MG/DL (ref 70–99)
HCT VFR BLD AUTO: 24.2 % (ref 35–47)
HGB BLD-MCNC: 7.6 G/DL (ref 11.7–15.7)
HOLD SPECIMEN: NORMAL
IMM GRANULOCYTES # BLD: 0 10E3/UL
IMM GRANULOCYTES NFR BLD: 0 %
INR PPP: 1.77 (ref 0.85–1.15)
INTERPRETATION ECG - MUSE: NORMAL
LYMPHOCYTES # BLD AUTO: 0.9 10E3/UL (ref 0.8–5.3)
LYMPHOCYTES NFR BLD AUTO: 37 %
MAGNESIUM SERPL-MCNC: 1.8 MG/DL (ref 1.8–2.6)
MCH RBC QN AUTO: 33 PG (ref 26.5–33)
MCHC RBC AUTO-ENTMCNC: 31.4 G/DL (ref 31.5–36.5)
MCV RBC AUTO: 105 FL (ref 78–100)
MONOCYTES # BLD AUTO: 0.2 10E3/UL (ref 0–1.3)
MONOCYTES NFR BLD AUTO: 9 %
NEUTROPHILS # BLD AUTO: 1.2 10E3/UL (ref 1.6–8.3)
NEUTROPHILS NFR BLD AUTO: 47 %
NRBC # BLD AUTO: 0 10E3/UL
NRBC BLD AUTO-RTO: 0 /100
P AXIS - MUSE: NORMAL DEGREES
PLATELET # BLD AUTO: 94 10E3/UL (ref 150–450)
POTASSIUM BLD-SCNC: 4.3 MMOL/L (ref 3.4–5.3)
POTASSIUM BLD-SCNC: 4.3 MMOL/L (ref 3.4–5.3)
PR INTERVAL - MUSE: 104 MS
PROT SERPL-MCNC: 4.9 G/DL (ref 6.8–8.8)
QRS DURATION - MUSE: 74 MS
QT - MUSE: 432 MS
QTC - MUSE: 495 MS
R AXIS - MUSE: -18 DEGREES
RBC # BLD AUTO: 2.3 10E6/UL (ref 3.8–5.2)
SODIUM SERPL-SCNC: 138 MMOL/L (ref 133–144)
SODIUM SERPL-SCNC: 138 MMOL/L (ref 133–144)
SYSTOLIC BLOOD PRESSURE - MUSE: NORMAL MMHG
T AXIS - MUSE: 0 DEGREES
VENTRICULAR RATE- MUSE: 79 BPM
WBC # BLD AUTO: 2.6 10E3/UL (ref 4–11)

## 2022-02-16 PROCEDURE — 97530 THERAPEUTIC ACTIVITIES: CPT | Mod: GP

## 2022-02-16 PROCEDURE — 97161 PT EVAL LOW COMPLEX 20 MIN: CPT | Mod: GP

## 2022-02-16 PROCEDURE — 250N000013 HC RX MED GY IP 250 OP 250 PS 637

## 2022-02-16 PROCEDURE — 99233 SBSQ HOSP IP/OBS HIGH 50: CPT | Mod: GC | Performed by: STUDENT IN AN ORGANIZED HEALTH CARE EDUCATION/TRAINING PROGRAM

## 2022-02-16 PROCEDURE — 85610 PROTHROMBIN TIME: CPT | Performed by: INTERNAL MEDICINE

## 2022-02-16 PROCEDURE — 250N000013 HC RX MED GY IP 250 OP 250 PS 637: Performed by: STUDENT IN AN ORGANIZED HEALTH CARE EDUCATION/TRAINING PROGRAM

## 2022-02-16 PROCEDURE — 93975 VASCULAR STUDY: CPT

## 2022-02-16 PROCEDURE — 85025 COMPLETE CBC W/AUTO DIFF WBC: CPT | Performed by: INTERNAL MEDICINE

## 2022-02-16 PROCEDURE — 97165 OT EVAL LOW COMPLEX 30 MIN: CPT | Mod: GO

## 2022-02-16 PROCEDURE — 120N000002 HC R&B MED SURG/OB UMMC

## 2022-02-16 PROCEDURE — 250N000011 HC RX IP 250 OP 636

## 2022-02-16 PROCEDURE — 93005 ELECTROCARDIOGRAM TRACING: CPT

## 2022-02-16 PROCEDURE — 36415 COLL VENOUS BLD VENIPUNCTURE: CPT | Performed by: INTERNAL MEDICINE

## 2022-02-16 PROCEDURE — 80053 COMPREHEN METABOLIC PANEL: CPT | Performed by: INTERNAL MEDICINE

## 2022-02-16 PROCEDURE — 97535 SELF CARE MNGMENT TRAINING: CPT | Mod: GO

## 2022-02-16 PROCEDURE — 82310 ASSAY OF CALCIUM: CPT | Performed by: STUDENT IN AN ORGANIZED HEALTH CARE EDUCATION/TRAINING PROGRAM

## 2022-02-16 PROCEDURE — 99232 SBSQ HOSP IP/OBS MODERATE 35: CPT | Mod: GC | Performed by: INTERNAL MEDICINE

## 2022-02-16 PROCEDURE — 83735 ASSAY OF MAGNESIUM: CPT | Performed by: STUDENT IN AN ORGANIZED HEALTH CARE EDUCATION/TRAINING PROGRAM

## 2022-02-16 PROCEDURE — 93975 VASCULAR STUDY: CPT | Mod: 26 | Performed by: RADIOLOGY

## 2022-02-16 PROCEDURE — 97530 THERAPEUTIC ACTIVITIES: CPT | Mod: GO

## 2022-02-16 PROCEDURE — 36415 COLL VENOUS BLD VENIPUNCTURE: CPT | Performed by: STUDENT IN AN ORGANIZED HEALTH CARE EDUCATION/TRAINING PROGRAM

## 2022-02-16 RX ORDER — SPIRONOLACTONE 25 MG/1
50 TABLET ORAL DAILY
Status: DISCONTINUED | OUTPATIENT
Start: 2022-02-17 | End: 2022-02-17

## 2022-02-16 RX ORDER — PEDI MULTIVIT NO.128/VITAMIN K 500 MCG/ML
1 LIQUID (ML) ORAL DAILY
Status: DISCONTINUED | OUTPATIENT
Start: 2022-02-16 | End: 2022-02-21 | Stop reason: HOSPADM

## 2022-02-16 RX ORDER — FOLIC ACID 5 MG/ML
1 INJECTION, SOLUTION INTRAMUSCULAR; INTRAVENOUS; SUBCUTANEOUS DAILY
Status: DISCONTINUED | OUTPATIENT
Start: 2022-02-16 | End: 2022-02-19

## 2022-02-16 RX ORDER — VENLAFAXINE HYDROCHLORIDE 150 MG/1
150 TABLET, EXTENDED RELEASE ORAL AT BEDTIME
Status: DISCONTINUED | OUTPATIENT
Start: 2022-02-16 | End: 2022-02-16

## 2022-02-16 RX ORDER — VENLAFAXINE HYDROCHLORIDE 75 MG/1
75 CAPSULE, EXTENDED RELEASE ORAL AT BEDTIME
Status: DISCONTINUED | OUTPATIENT
Start: 2022-02-16 | End: 2022-02-17

## 2022-02-16 RX ORDER — OXYCODONE HYDROCHLORIDE 5 MG/1
5 TABLET ORAL EVERY 6 HOURS PRN
Status: DISCONTINUED | OUTPATIENT
Start: 2022-02-16 | End: 2022-02-17

## 2022-02-16 RX ORDER — FOLIC ACID 1 MG/1
1 TABLET ORAL DAILY
Status: DISCONTINUED | OUTPATIENT
Start: 2022-02-16 | End: 2022-02-21 | Stop reason: HOSPADM

## 2022-02-16 RX ORDER — THIAMINE HYDROCHLORIDE 100 MG/ML
100 INJECTION, SOLUTION INTRAMUSCULAR; INTRAVENOUS DAILY
Status: DISCONTINUED | OUTPATIENT
Start: 2022-02-16 | End: 2022-02-19

## 2022-02-16 RX ORDER — LACTULOSE 10 G/15ML
20 SOLUTION ORAL
Status: DISCONTINUED | OUTPATIENT
Start: 2022-02-16 | End: 2022-02-19

## 2022-02-16 RX ORDER — VENLAFAXINE HYDROCHLORIDE 150 MG/1
150 CAPSULE, EXTENDED RELEASE ORAL
Status: DISCONTINUED | OUTPATIENT
Start: 2022-02-16 | End: 2022-02-16

## 2022-02-16 RX ORDER — LACTULOSE 10 G/15ML
100 SOLUTION ORAL
Status: DISCONTINUED | OUTPATIENT
Start: 2022-02-16 | End: 2022-02-19

## 2022-02-16 RX ORDER — VENLAFAXINE HYDROCHLORIDE 150 MG/1
150 CAPSULE, EXTENDED RELEASE ORAL AT BEDTIME
Status: DISCONTINUED | OUTPATIENT
Start: 2022-02-16 | End: 2022-02-16

## 2022-02-16 RX ADMIN — INSULIN ASPART 2 UNITS: 100 INJECTION, SOLUTION INTRAVENOUS; SUBCUTANEOUS at 13:29

## 2022-02-16 RX ADMIN — LACTULOSE 20 G: 10 SOLUTION ORAL at 23:56

## 2022-02-16 RX ADMIN — PANCRELIPASE 2 CAPSULE: 24000; 76000; 120000 CAPSULE, DELAYED RELEASE PELLETS ORAL at 19:26

## 2022-02-16 RX ADMIN — PREGABALIN 25 MG: 25 CAPSULE ORAL at 08:53

## 2022-02-16 RX ADMIN — RIFAXIMIN 550 MG: 550 TABLET ORAL at 19:27

## 2022-02-16 RX ADMIN — LEVOTHYROXINE SODIUM 175 MCG: 0.03 TABLET ORAL at 08:53

## 2022-02-16 RX ADMIN — LACTULOSE 20 G: 10 SOLUTION ORAL at 19:27

## 2022-02-16 RX ADMIN — POTASSIUM CHLORIDE 40 MEQ: 750 CAPSULE, EXTENDED RELEASE ORAL at 19:27

## 2022-02-16 RX ADMIN — VENLAFAXINE HYDROCHLORIDE 150 MG: 150 CAPSULE, EXTENDED RELEASE ORAL at 11:27

## 2022-02-16 RX ADMIN — LACTULOSE 30 ML: 10 SOLUTION ORAL at 14:14

## 2022-02-16 RX ADMIN — RIFAXIMIN 550 MG: 550 TABLET ORAL at 08:55

## 2022-02-16 RX ADMIN — LACTULOSE 30 ML: 10 SOLUTION ORAL at 11:26

## 2022-02-16 RX ADMIN — LACTULOSE 20 G: 10 SOLUTION ORAL at 17:57

## 2022-02-16 RX ADMIN — OXYCODONE HYDROCHLORIDE 10 MG: 10 TABLET ORAL at 02:45

## 2022-02-16 RX ADMIN — THIAMINE HCL TAB 100 MG 100 MG: 100 TAB at 13:30

## 2022-02-16 RX ADMIN — LACTULOSE 20 G: 10 SOLUTION ORAL at 13:31

## 2022-02-16 RX ADMIN — PANCRELIPASE 3 CAPSULE: 30000; 6000; 19000 CAPSULE, DELAYED RELEASE PELLETS ORAL at 13:30

## 2022-02-16 RX ADMIN — FUROSEMIDE 40 MG: 40 TABLET ORAL at 08:52

## 2022-02-16 RX ADMIN — APIXABAN 5 MG: 5 TABLET, FILM COATED ORAL at 19:27

## 2022-02-16 RX ADMIN — POTASSIUM CHLORIDE 40 MEQ: 750 CAPSULE, EXTENDED RELEASE ORAL at 08:56

## 2022-02-16 RX ADMIN — ONDANSETRON 4 MG: 2 INJECTION INTRAMUSCULAR; INTRAVENOUS at 13:44

## 2022-02-16 RX ADMIN — LACTULOSE 20 G: 10 SOLUTION ORAL at 21:41

## 2022-02-16 RX ADMIN — INSULIN GLARGINE 4 UNITS: 100 INJECTION, SOLUTION SUBCUTANEOUS at 13:29

## 2022-02-16 RX ADMIN — SPIRONOLACTONE 25 MG: 25 TABLET, FILM COATED ORAL at 08:53

## 2022-02-16 RX ADMIN — FOLIC ACID 1 MG: 1 TABLET ORAL at 13:30

## 2022-02-16 RX ADMIN — LACTULOSE 20 G: 10 SOLUTION ORAL at 16:56

## 2022-02-16 RX ADMIN — VENLAFAXINE HYDROCHLORIDE 75 MG: 75 CAPSULE, EXTENDED RELEASE ORAL at 22:31

## 2022-02-16 RX ADMIN — ATORVASTATIN CALCIUM 20 MG: 20 TABLET, FILM COATED ORAL at 08:52

## 2022-02-16 RX ADMIN — Medication 1 CAPSULE: at 19:27

## 2022-02-16 RX ADMIN — APIXABAN 5 MG: 5 TABLET, FILM COATED ORAL at 08:53

## 2022-02-16 RX ADMIN — HYDROXYZINE HYDROCHLORIDE 50 MG: 25 TABLET ORAL at 06:04

## 2022-02-16 RX ADMIN — INSULIN ASPART 3 UNITS: 100 INJECTION, SOLUTION INTRAVENOUS; SUBCUTANEOUS at 19:25

## 2022-02-16 ASSESSMENT — ACTIVITIES OF DAILY LIVING (ADL)
ADLS_ACUITY_SCORE: 11
ADLS_ACUITY_SCORE: 12
ADLS_ACUITY_SCORE: 11

## 2022-02-16 NOTE — PLAN OF CARE
"Shift: 1100 - 0730  Reason for admission: bilateral lower extremity swelling/pain   Vitals:  BP soft, OVSS   BP 99/54 (BP Location: Left arm, Patient Position: Semi-Courtney's)   Pulse 83   Temp 98  F (36.7  C) (Oral)   Resp 18   Ht 1.702 m (5' 7\")   Wt 73.7 kg (162 lb 7.7 oz)   SpO2 98%   BMI 25.45 kg/m    Activity: Assist x1 with cane, pt requires to stand for a moment prior to ambulating.   Pain: Pain of 7/10 on bilateral upper/lower extremity. Oxycodone 10mg and atarax 50 mg given.   Neuro: A&O x4, able to make needs known.   Cardiac: HR regular, on continuous cardiac monitoring.   Respiratory: WDL  GI/: Abdomen soft, pt had loose BM overnight  Diet: High Kcal Protein Diet, pt NPO as of midnight for a procedure.   Lines: PIV to Left Lower forearm, SL.   Labs/imaging/Consults: NPO after midnight for US  Discharge Plan: Admitted as Inpatient, [place] [id number] service covering, continue POC.     "

## 2022-02-16 NOTE — PROGRESS NOTES
CLINICAL NUTRITION SERVICES - ASSESSMENT NOTE     Nutrition Prescription    RECOMMENDATIONS FOR MDs/PROVIDERS TO ORDER:  Recommend starting Aniya Plus (water miscible fat soluble vitamin) capsule once daily.  Re-check vitamin A, D, E levels in 3 months.   Would stop MVI, vitamin A, vitamin D3, vitamin E during the duration of this alternative vitamin therapy (Aniya Plus).      Recommend changing enzymes to Creon 24,000 - 2 with meals, 1 with snacks to provide a therapeutic dose.  Consider re-checking fecal elastase on a formed stool to ensure a diagnosis of pancreatic insufficiency.     Malnutrition Status:    Severe malnutrition in the context of chronic illness    Recommendations already ordered by Registered Dietitian (RD):  Trial Ensure Clear and Gelatein Plus    Future/Additional Recommendations:  Minimize interruptions to diet as much as possible.  Encourage small/frequent meals that include CHO/protein/fat, specifically a bedtime snack.       REASON FOR ASSESSMENT  Susan Puckett is a/an 51 year old female assessed by the dietitian for Provider Order - For nutrition recommendations in pt with cirrhosis    Nutritional pertinent PMH includes:  type 2 diabetes on insulin, liver cirrhosis 2/2 to CUETO, RNYGB 2006, hepatic encephalopathy, hepatic hydrothorax and GEORGETTE.  Patient currently on the liver transplant list.      NUTRITION HISTORY  She generally follows low salt diet with focus on protein, however the  reports she does not get near her sodium limit.     Pt has had reduced oral intake d/t poor appetite for many months.  Has been eating 25-50% of her usual meal size during this time.  Within the last month the nausea and vomiting have become more of an issue and there are more days where she only tolerates a few bites of food per meal. Another issue impacting her ability to eat is her sleepiness/cognition, which has also been more of an issue in the last month.  She complains about the amount of pills she  "has to take.     Has been struggling with high and low BG (has a dexcom CGM).    She dislikes most protein drinks she has tried.  She has protein H20 at home that seems to be better tolerated than others.     Home medications include: Creon 6 - 3 with meals, MVI, vitamin A 10,000, vitamin C 1000 mg, vitamin D2 50,000 once per week, vitamin D 2000 IU and vitamin E 400 international unit(s), vitamin B12 1000 mcg.  Also noted aquadeks 1 capsule daily but patient's  didn't seem to think she takes this.     CURRENT NUTRITION ORDERS  Diet: High Kcal/High Protein + Ensure Enlive between meals    Intake/Tolerance: Sleepy and comes in and out of conversation during my visit.      LABS  Hgb A1C 5.6% (1/10/22)  Vitamin A <0.06 (low) (1/10/22)  Vitamin D 8 (low) (1/10/22)  Vitamin E 1.8 (low) (1/10/22)  Pancreatic elastase 15 (low) - 11/20/19    MEDICATIONS  Lasix 40 mg  Spironolactone   Creon 6 - 3 capsules with meals (243 units of lipase/kg/meal) - not a therapeutic dose  Lactulose TID  Lantus 4 units daily  Med sliding scale insulin  Zofran prn    ANTHROPOMETRICS  Height: 170.2 cm (5' 7\")  Most Recent Weight: 73.7 kg (162 lb 7.7 oz)    IBW: 61.4 kg  BMI: Overweight BMI 25-29.9  Weight History:  Weight fluctuates greatly d/t fluid trends. Patient reports -160#, although admits to being down in the 140's as well.   Wt Readings from Last 15 Encounters:   02/15/22 73.7 kg (162 lb 7.7 oz)   01/24/22 71.9 kg (158 lb 9.6 oz)   01/13/22 81.2 kg (179 lb)   01/06/22 74.8 kg (165 lb)   12/27/21 65.4 kg (144 lb 3.2 oz)   12/13/21 71.7 kg (158 lb)   12/09/21 68.9 kg (151 lb 12.8 oz)   11/16/21 74.4 kg (164 lb)   11/06/21 74.5 kg (164 lb 4.8 oz)   11/05/21 73.5 kg (162 lb 0.6 oz)   10/05/21 78 kg (171 lb 14.4 oz)   07/07/21 71.9 kg (158 lb 9.6 oz)   05/18/21 71.3 kg (157 lb 1.6 oz)   04/07/21 67.7 kg (149 lb 4.8 oz)   02/25/21 62.3 kg (137 lb 4.8 oz)   Dosing Weight: 64 kg (adj wt based on IBW of 61.4 kg and actual BW of " 73.7 kg)    ASSESSED NUTRITION NEEDS  Estimated Energy Needs: 5106-1593 kcals/day (25 - 30 kcals/kg)+  Justification: Maintenance  Estimated Protein Needs: 64-96 grams protein/day (1 - 1.5 grams of pro/kg)  Justification: Increased needs  Estimated Fluid Needs: per MD    PHYSICAL FINDINGS  No hair loss, eyesight concerns, mouth sores, nail issues noted.    MALNUTRITION  % Intake: </=50% for >/= 1 month (severe)  % Weight Loss: Difficult to assess d/t fluctuations with fluid  Subcutaneous Fat Loss: Facial region:  moderate, Lower arm: moderate and Thoracic/intercostal: moderate  Muscle Loss: Temporal:  mild, Facial & jaw region: moderate, Scapular bone: moderate, Upper arm (bicep, tricep): moderate, Dorsal hand: mild and Upper leg (quadricep, hamstring): moderate  Fluid Accumulation/Edema: Mild  Malnutrition Diagnosis: Severe malnutrition in the context of chronic illness    NUTRITION DIAGNOSIS  Inadequate oral intake related to AMS, nausea/vomiting, reduced appetite as evidenced by patient consuming <50% of usual intake for >1 month.     INTERVENTIONS  Implementation  Nutrition Education: Provided education on protein needs, sources.  Tips for increasing protein intake.  Reviewed alternative oral supplement options.  Discussed importance of small/frequent meals in maintaining muscle mass and keeping BG stable.      Collaboration with other providers    Medical food supplement therapy     Goals  Patient to consume % of nutritionally adequate meal trays TID, or the equivalent with supplements/snacks.     Monitoring/Evaluation  Progress toward goals will be monitored and evaluated per protocol.    Bhavana Paige MS, RD, LD, CCTD  7A/Obs units, pager [phone]

## 2022-02-16 NOTE — PROVIDER NOTIFICATION
"Shift 1176-1077  Reason for admission: bilateral lower extremity swelling/pain and hypersomnolence  Vitals: Afebrile, BP soft, OVSS   /55 (BP Location: Left arm)   Pulse 91   Temp 97.7  F (36.5  C) (Oral)   Resp 18   Ht 1.702 m (5' 7\")   Wt 73.7 kg (162 lb 7.7 oz)   SpO2 98%   BMI 25.45 kg/m    Activity: Assist x1 with cane, pt requires to stand for a moment prior to ambulating.   Pain: no complaint of pain this afternoon.   Neuro: A&O x4 at start of shift; patient had sudden change in speech, with slow verbal response, studder/difficulty articulating her words. Provider notified and came to bedside. [place] Assessment - Stage II with Lactulose protocol started. Patient is drowsy, arouses to voice and entering room, able to make needs know.   Cardiac: HR regular, on continuous cardiac monitoring. EKG order requested after Magnesium and Potassium WNL.   Respiratory: LS Clear  GI/: Abdomen soft,   Diet: High Kcal, High Protein Diet.   Lines: PIV to Left Lower forearm, SL. IV Lasix given this am.   Wounds/Incisions: na  Labs/imaging/Consults: US TIPSS Doppler completed this am, see results.   Discharge Plan: continue POC.     at bedside this afternoon.     Yessi Gonzalez RN  [phone]    "

## 2022-02-16 NOTE — PROGRESS NOTES
"   02/16/22 1000   Quick Adds   Type of Visit Initial PT Evaluation   Living Environment   People in Home spouse   Current Living Arrangements house   Home Accessibility stairs to enter home   Number of Stairs, Main Entrance 3   Transportation Anticipated family or friend will provide   Living Environment Comments pt lives in rambler style home, once up 3 SHANTI all needs can be met on main floor (only laundry in basement). She has tub/shower with chair, no grab bars   Self-Care   Usual Activity Tolerance moderate   Current Activity Tolerance fair   Regular Exercise No   Equipment Currently Used at Home cane, straight;shower chair   Fall history within last six months yes   Activity/Exercise/Self-Care Comment pt has had continued functional decline over past few months due to disease progression. She is fairly homebound due to limited activity tolerance.  has been off work and able to assist more at home recently, he helps with ADLs as needed (mostly LB dressing and bathing) as well as CGA/SBA on stairs, pt is Ben with SEC in home.   General Information   Onset of Illness/Injury or Date of Surgery 02/14/22   Referring Physician Porfirio Mosley MD   Patient/Family Therapy Goals Statement (PT) \"be as IND as possible and not a burden to family\"   Pertinent History of Current Problem (include personal factors and/or comorbidities that impact the POC) per chart review, \"Ms. 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 Hemodynamically, VS WNLs. Clinically, the patient is highly anxious.\"   Existing Precautions/Restrictions fall   General Observations low BPs, 90s/50s which pt reports is typicaly for her   Cognition   Affect/Mental Status (Cognition) sad/depressed affect   Orientation Status (Cognition) oriented x 3   Cognitive " "Status Comments overwhelmed/dissapointed with functional decline, tearful, excellent safety awareness   Pain Assessment   Patient Currently in Pain Yes, see Vital Sign flowsheet   Integumentary/Edema   Integumentary/Edema Comments 1+ edema in B LEs, pt reports \"was much much worse a few days ago but getting better\"   Posture    Posture Protracted shoulders;Forward head position   Range of Motion (ROM)   ROM Comment WFL   Strength (Manual Muscle Testing)   Strength Comments B LEs grossly 3+ to 4/5, generalized weaknkess present with functional mobility   Bed Mobility   Comment, (Bed Mobility) IND   Transfers   Comment, (Transfers) Nabil STS to cane   Gait/Stairs (Locomotion)   Comment, (Gait/Stairs) pt ambulates to bathroom with SEC and close SBA, wide SONIA, noted proximal weakness, very slow, no overt LOB, reaches out for sink/bed/etc for extra support   Balance   Balance Comments no overt LOB throughout session, able to perform standing hygiene tasks at sink well   Sensory Examination   Sensory Perception Comments reports mild N/T in feet since swelling started   Clinical Impression   Criteria for Skilled Therapeutic Intervention Yes, treatment indicated   PT Diagnosis (PT) impaired functional mobility   Influenced by the following impairments reduced activity tolerance, generalized weakness, pain   Functional limitations due to impairments transfers, ambulation, stairs, ADLs   Clinical Presentation (PT Evaluation Complexity) Stable/Uncomplicated   Clinical Presentation Rationale PMH, clinician impression   Clinical Decision Making (Complexity) low complexity   Planned Therapy Interventions (PT) bed mobility training;balance training;gait training;home exercise program;neuromuscular re-education;stair training;strengthening;transfer training   Risk & Benefits of therapy have been explained evaluation/treatment results reviewed;care plan/treatment goals reviewed;risks/benefits reviewed;current/potential barriers " reviewed;participants voiced agreement with care plan;participants included;patient   Clinical Impression Comments see PT DC planner below   PT Discharge Planning   PT Discharge Recommendation (DC Rec) home with assist;home with home care physical therapy   PT Rationale for DC Rec pt slightly below her baseline, she wants to be more IND with ADLs and improve activity tolerance, very motivated so recommend  PT/OT as spouse also planning to return to work soon so pt hernandez be alone at times at home   PT Brief overview of current status Ax1 with cane   Plan of Care Review   Plan of Care Reviewed With patient   Total Evaluation Time   Total Evaluation Time (Minutes) 8   Physical Therapy Goals   PT Frequency 5x/week   PT Predicated Duration/Target Date for Goal Attainment 02/23/22   PT Goals Transfers;Gait;Stairs;PT Goal 1   PT: Transfers Modified independent;Assistive device;Sit to/from stand;Bed to/from chair   PT: Gait Modified independent;Straight cane;100 feet   PT: Stairs 3 stairs;Minimal assist;Assistive device  (SEC)   PT: Goal 1 pt will demo ability to be IND with LE strengthening program

## 2022-02-16 NOTE — PLAN OF CARE
"Shift 4634-1229  Reason for admission: bilateral lower extremity swelling/pain and hypersomnolence  Vitals: Afebrile, BP soft, OVSS   BP 90/61 (BP Location: Right arm)   Pulse 73   Temp 97.7  F (36.5  C) (Oral)   Resp 16   Ht 1.702 m (5' 7\")   Wt 73.7 kg (162 lb 7.7 oz)   SpO2 100%   BMI 25.45 kg/m    Activity: Assist x1 with cane, pt requires to stand for a moment prior to ambulating.   Pain: no complaint of pain this afternoon.   Neuro: A&O x4, able to make needs know.   Cardiac: HR regular, on continuous cardiac monitoring.   Respiratory: LS Clear  GI/: Abdomen soft,   Diet: Mod CHO and 2g Sodium diet changed to High Kcal Protein Diet late this afternoon.   Lines: PIV to Left Lower forearm, SL. IV Lasix given.   Wounds/Incisions: na  Labs/imaging/Consults: non-contrast Head CT to r/o hemophage - pending;   Discharge Plan: Admitted as Inpatient, [place] [address] service covering, continue POC.     at bedside.     Yessi Gonzalez, RN  [phone]  "

## 2022-02-16 NOTE — PROGRESS NOTES
"[facility]    Hepatology Follow-up    CC:      Edema    Dx: Decompensated CUETO cirrhosis                  LE edema                  Hypernatremia and hypokalemia                      24 hour events:   MARY  Subjective:  No abd pain  Patient denies fevers, sweats or chills.    Medications  Current Facility-Administered Medications   Medication Dose Route Frequency     amylase-lipase-protease  3 capsule Oral TID w/meals     apixaban ANTICOAGULANT  5 mg Oral BID     atorvastatin  20 mg Oral QAM     folic acid  1 mg Oral Daily    Or     folic acid  1 mg Intravenous Daily     furosemide  40 mg Oral Daily     insulin aspart  1-7 Units Subcutaneous TID AC     insulin aspart  1-5 Units Subcutaneous At Bedtime     insulin glargine  4 Units Subcutaneous QAM     lactulose  30 mL Oral TID     levothyroxine  175 mcg Oral Daily     potassium chloride ER  40 mEq Oral BID     pregabalin  25 mg Oral Daily     rifaximin  550 mg Oral BID     sodium chloride (PF)  3 mL Intracatheter Q8H     [START ON 2/17/2022] spironolactone  50 mg Oral Daily     vitamin B1  100 mg Oral Daily    Or     thiamine  100 mg Intravenous Daily     venlafaxine  150 mg Oral At Bedtime     venlafaxine  150 mg Oral Daily with breakfast       Review of systems  A 10-point review of systems was negative.    Examination  BP 99/54 (BP Location: Left arm, Patient Position: Semi-Courtney's)   Pulse 83   Temp 98  F (36.7  C) (Oral)   Resp 18   Ht 1.702 m (5' 7\")   Wt 73.7 kg (162 lb 7.7 oz)   SpO2 98%   BMI 25.45 kg/m    No intake or output data in the 24 hours ending 02/16/22 1329    Constitutional: cooperative  Eyes: Sclera anicteric  Ears/nose/mouth/throat: mucus membranes moist  Neck: supple  CV: No edema  Respiratory: Unlabored breathing  Abd: Nondistended, +bs, nontender  Skin: warm, perfused, no jaundice  Neuro: AAO x 3, but sleepy upon exam    Laboratory  Lab Results   Component Value Date     02/16/2022     " 02/16/2022     07/05/2021    POTASSIUM 4.3 02/16/2022    POTASSIUM 4.3 02/16/2022    POTASSIUM 3.0 07/05/2021    CHLORIDE 107 02/16/2022    CHLORIDE 107 02/16/2022    CHLORIDE 100 02/04/2022    CHLORIDE 107 07/05/2021    CO2 28 02/16/2022    CO2 27 02/16/2022    CO2 29 07/05/2021    BUN 9 02/16/2022    BUN 8 02/16/2022    BUN 11 07/05/2021    CR 1.00 02/16/2022    CR 1.03 02/16/2022    CR 1.10 07/05/2021       Lab Results   Component Value Date    BILITOTAL 1.8 02/16/2022    BILITOTAL 0.5 07/05/2021    ALT 23 02/16/2022    ALT 24 07/05/2021    AST 51 02/16/2022    AST 33 07/05/2021    ALKPHOS 144 02/16/2022    ALKPHOS 133 07/05/2021       Lab Results   Component Value Date    WBC 2.6 02/16/2022    WBC 4.0 07/05/2021    HGB 7.6 02/16/2022    HGB 11.5 07/05/2021     02/16/2022    MCV 97 07/05/2021    PLT 94 02/16/2022     07/05/2021       Lab Results   Component Value Date    INR 1.77 02/16/2022    INR 1.18 07/05/2021       MELD-Na score: 15 at 2/16/2022 10:33 AM  MELD score: 15 at 2/16/2022 10:33 AM  Calculated from:  Serum Creatinine: 1.00 mg/dL at 2/16/2022  5:51 AM  Serum Sodium: 138 mmol/L (Using max of 137 mmol/L) at 2/16/2022  5:51 AM  Total Bilirubin: 1.8 mg/dL at 2/16/2022  5:51 AM  INR(ratio): 1.77 at 2/16/2022 10:33 AM  Age: 51 years    Assessment  51 year old female with a history of CUETO cirrhosis c/b HE  And hepatic hydrothorax s/p TIPs in 11/2021, on the liver transplant list, Faustino en Y GB surgery in 2006, DMII on insulin who presents with leg swelling and fatigue.  Found to be hypokalemic in ED and has evidence of HE.    Cueto cirrhosis MELD-Na 15  Hepatic encephalopathy resolving  Hypernatremia resolving  Patient has been having hepatic encephalopathy which is waxing and waning, overall she feels improved as compared to yesterday.  Electrolytes are trending towards normalization, infectious panel negative so far, ultrasound abdomen with TIPS Doppler unremarkable.  Once stable,  patient can be discharged per primary team      Recommendations  --Continue to encourage oral intake  --Titrate lactulose for 3-4 bowel movements per day, hold if more than 4 bowel movements  --Continue rifaximin  --Continue diuretics at current dose  --Minimize opioids  --Follow-up with Dr. Cook 2 months    Thank you for involving us in this patient's care. Please do not hesitate to contact the GI service with any questions or concerns.      Pt care plan discussed with Dr. Sadler, Hepatology staff physician.    This note was created with voice recognition software, and while reviewed for accuracy, typos may remain.    Pelon Scott MD  [facility] Hepatology Fellow  Pager: [phone]

## 2022-02-16 NOTE — PROVIDER NOTIFICATION
Randi Mims Team at bedside 0930 to assess patient, they will call writer with update, awaiting call.   OBS 3, K.M.  Can you call me with update on change to patient's mentation/speech from bedside visit @ 0930?  Thanks, Yessi -[phone]

## 2022-02-16 NOTE — PROVIDER NOTIFICATION
Daughter had various requests re: increasing fluids, Gold 9 Provider paged.   Dr. Bentley Cervantes, Jaiden Donaldson Provider returned page and will call Daughter, Dr. Lacy Day's cell @ [phone] to discuss further plans.

## 2022-02-16 NOTE — PROVIDER NOTIFICATION
Paged Randi 4  OBS 3, K.M.  Patient had sudden change in speach /c slow studder, difficulty finding words.   A&O. US to come to bedside shortly.   Can you come to bedside to see patient?  Thanks, Yessi MOSS [phone]

## 2022-02-16 NOTE — PROGRESS NOTES
[facility]    Daily Progress Note - Medicine Service, [facility]ANT[facility] TEAM 4       Date of Admission: 2/14/2022    Hospital Day: 2    Changes today 02/16/2022:  - Start [place] protocol.  - Update dose of venlafaxine to be 75 mg nightly.    Assessment & Plan        Ms. 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 Hemodynamically, VS WNLs. Clinically, the patient is highly anxious.    Hypersomnolence  DDx includes acute-on-chronic hepatic encephalopathy (although ammonia negative at 47 umol/L on 02/15/2022 at 1 AM); concussion and/or intracranial bleed after reported fall/head injury on 01/31/2022 (head CT at OSH negative for a bleed at the time, although patient high risk given her ongoing anticoagulation); infection (although no localizing evidence of one; no leukocytosis; and procalcitonin negative); uncontrolled depression/anxiety (especially given anxiety apparent on exam); or polypharmacy.  - s/p abdominal ultrasound (WITHOUT Doppler) on 02/15/2022 - no ascites  - s/p non-contrast head CT on 02/15/2022: no acute intracranial pathology  - Ultrasound with TIPS doppler on 02/16/2022: TIPS without stenosis  - Continue to follow blood culture collected on 02/15/2022 at 9 AM: no growth to date.  - Re-consulting psychiatry; appreciate team's assistance.  - suspecting that worsening somnolence, confusion, and slurred speech most likely related to HE given no stools yesterday and only 1 stool overnight despite taking her TID angel lactulose. Initiate [place] protocol with goal of 3-4 stools    Leg swelling, bilateral  Leg pain, bilateral  - s/p bilateral lower extremity ultrasound on 02/15/2022 - negative for DVT  - s/p IV furosemide 40 mg x1 dose on 02/15/2022 at 3:30 AM   - s/p PO furosemide 40 mg  x1 dose on 02/15/2022 at 9:30 AM  - s/p PO oxycodone 5 mg on 02/15/2022 at 1 AM  - s/p IV furosemide 40 mg x1 dose on 02/15/2022  - Home PO furosemide 40 mg daily in the AM  - Continue to monitor.  - Pregabalin 25 mg daily.    CUETO cirrhosis  Anemia, macrocytic, chronic  Thrombocytopenia, chronic  Patient follows with Dr. Cook. MELD-Na score 19 (conferring 3-4% 90-day mortality risk). The patient is currently on the transplant list. Patient and  report she is adherent to lactulose/rifaximin with ~4 bowel movements/day.  - Hepatology consulted and following; appreciate team's assistance.  - s/p abdominal ultrasound on 02/15/2022  - s/p IV furosemide 40 mg x1 dose on 02/15/2022 at 3:30 AM (as above)  - s/p PO furosemide 40 mg x1 dose on 02/15/2022 at 9:30 AM (as above)  - s/p IV furosemide 40 mg x1 dose on 02/15/2022 (as above)  - Home PO furosemide 40 mg daily in the AM (as above).  - Home lactulose 30 mL TID  - Home rifaximin 550 mg BID  - Home spironolactone 50 mg daily    Hypokalemia, resolved  Potassium 4.5 on 02/15/2022 at 6:30 AM (up from 2.9 on 02/15/2022 at 1:30 AM).  - s/p PO potassium chloride 40 mEq x2 doses on 02/15/2022 at 3:30 AM and 4:45 AM  - s/p continuous infusion of IV potassium chloride 10 mEq/hr on 02/15/2022 3:30 AM - 7 AM (stopped due to burning)  - Home potassium chloride 40 mEq BID  - Trend BMP.    Hypomagnesemia, resolved  Magnesium 1.6 mg/dL on 02/15/2022 at 1 AM. Magnesium up to 1.8 on 02/16/2022.  - s/p IV magnesium sulfate 2 g x1 dose    Long QT, improved  QTc 545 ms by EKG on 02/15/2022 at 12:30 AM. QTc 495 ms on 02/15/2022 at 9 AM. Note: QTc 516 ms by EKG on most recent EKG from 01/27/2022.  - repeat EKG ordered to f/up QTC     Weakness  - Physical therapy consulted; appreciate therapist's assistance.  - Occupational therapy consulted; appreciate therapist's assistance.    -- CHRONIC MEDICAL PROBLEMS --    Pancreatic insufficiency  Malnutrition  - Nutrition consulted and  following; appreciate dietitian's assistance.  - Increase amylase-lipase[place] [address]-61041 units x2 capsules TID with meals + 24000-76000 x1 capsule PRN with snacks/supplements.    DVT, right axillary vein, acute  Patient is managed by [facility]. She is a on a 3-month course of apixaban.  - Home apixaban 5 mg BID    Type 2 diabetes mellitus  HgbA1c 5.9% on 10/20/2021. Patient has a Dexcom CGM.  - Hold home sitaglitpin for now.  - Home insulin glargine 4 units nightly  - Medium-dose insulin aspart correction scale TID with meals and at bedtime  - Fingerstick glucose TID with meals and at bedtime  - Hypoglycemia protocol     Anxiety/Depression  - Resume most recent reported home dose of venlafaxine 75 mg nightly (from 100 mg BID recently ordered on Friday 02/11/2022).  - PO hydroxyzine 25-50 mg Q6H PRN  - Psychiatry consulted; appreciate team's assistance (as above)    Insomnia  - Hold home trazodone 100 mg nightly PRN.    Hypothyroidism  TSH 0.06, Free T4 elevated to 2.3. Pt follows with endocrine and recently had dose changed of levothyroxine.  - Home levothyroxine 175 mcg daily     Hyperlipidemia  - Home atorvastatin 20 mg daily       Diet: High Kcal/High Protein Diet, ADULT  Snacks/Supplements Adult: Ensure Enlive; Between Meals    DVT Prophylaxis: Home apixaban  Rossi Catheter: Not present  Fluids: PO  Central Lines: None  Cardiac Monitoring: ACTIVE order. Indication: QTc prolonging medication (48 hours)  Code Status: Full Code      Disposition Plan   Expected Discharge:     Anticipated discharge location:  Awaiting care coordination huddle  Delays:             The patient's care was discussed with the Attending Physician, Dr. Jaylene Yepez .    Porfirio Mosley MD  Medicine Service, [facility] TEAM [address] [place] [address]     Wednesday 02/16/2022    Securely message with the Vocera Web Console (learn more here)  Text page via [facility]  "Paging/Directory   Please see signed in provider for up to date coverage information    Clinically Significant Risk Factors Present on Admission               # Coagulation Defect: home medication list includes an anticoagulant medication  # Thrombocytopenia: Plts = 94 10e3/uL (Ref range: 150 - 450 10e3/uL) on admission, will monitor for bleeding     # Overweight: Estimated body mass index is 25.45 kg/m  as calculated from the following:    Height as of this encounter: 1.702 m (5' 7\").    Weight as of this encounter: 73.7 kg (162 lb 7.7 oz).    ______________________________________________________________________    Interval History   - No acute events over night.  - Nursing notes reviewed.  - Nursing concerned about an acute change in the patient's mental status this morning.  - She was noted to be speaking slowly and slurring her speech, as she had reportedly been doing at home for the past 1-2 weeks.  - She had no other findings on exam concerning for stroke.  - She complains of ongoing leg pain in both legs that is unchanged from yesterday.    Data reviewed today: I reviewed all medications, new labs, and imaging results over the last 24 hours.    Physical Exam   Vital Signs: Temp: 98  F (36.7  C) Temp src: Oral BP: 99/54 Pulse: 83   Resp: 18 SpO2: 98 % O2 Device: None (Room air)    Weight: 162 lbs 7.66 oz     General: tired-appearing woman sitting asleep but easily woken by voice in no acute distress; speaking slowly and slurring her speech; intermittently tearful  HEENT: infraorbital edema and hyperpigmentation  CV: regular rate/rhythm; no murmurs/rubs/gallops/clicks  Pulm: normal work of breathing on room air; clear to auscultation throughout  Abd: soft, non-tender, non-distended  Ext: improved edema in bilateral lower extremities  Neuro: initially and intermittently asleep during interview; slow and slurred speech; mild asterixis in bilateral hands  Skin: mildly jaundiced throughout  Psych: anxious, " occasionally sad and tearful    Data    Labs, microbiology, and imaging reviewed.

## 2022-02-17 ENCOUNTER — APPOINTMENT (OUTPATIENT)
Dept: ULTRASOUND IMAGING | Facility: CLINIC | Age: 52
DRG: 441 | End: 2022-02-17
Payer: COMMERCIAL

## 2022-02-17 ENCOUNTER — TELEPHONE (OUTPATIENT)
Dept: GASTROENTEROLOGY | Facility: CLINIC | Age: 52
End: 2022-02-17
Payer: COMMERCIAL

## 2022-02-17 ENCOUNTER — APPOINTMENT (OUTPATIENT)
Dept: OCCUPATIONAL THERAPY | Facility: CLINIC | Age: 52
DRG: 441 | End: 2022-02-17
Payer: COMMERCIAL

## 2022-02-17 LAB
ANION GAP SERPL CALCULATED.3IONS-SCNC: 4 MMOL/L (ref 3–14)
BUN SERPL-MCNC: 8 MG/DL (ref 7–30)
CALCIUM SERPL-MCNC: 8.1 MG/DL (ref 8.5–10.1)
CHLORIDE BLD-SCNC: 107 MMOL/L (ref 94–109)
CO2 SERPL-SCNC: 26 MMOL/L (ref 20–32)
CREAT SERPL-MCNC: 0.89 MG/DL (ref 0.52–1.04)
GFR SERPL CREATININE-BSD FRML MDRD: 79 ML/MIN/1.73M2
GLUCOSE BLD-MCNC: 161 MG/DL (ref 70–99)
GLUCOSE BLDC GLUCOMTR-MCNC: 139 MG/DL (ref 70–99)
GLUCOSE BLDC GLUCOMTR-MCNC: 146 MG/DL (ref 70–99)
GLUCOSE BLDC GLUCOMTR-MCNC: 175 MG/DL (ref 70–99)
GLUCOSE BLDC GLUCOMTR-MCNC: 198 MG/DL (ref 70–99)
HOLD SPECIMEN: NORMAL
POTASSIUM BLD-SCNC: 4.4 MMOL/L (ref 3.4–5.3)
SODIUM SERPL-SCNC: 137 MMOL/L (ref 133–144)

## 2022-02-17 PROCEDURE — 76882 US LMTD JT/FCL EVL NVASC XTR: CPT | Mod: 26 | Performed by: RADIOLOGY

## 2022-02-17 PROCEDURE — 99233 SBSQ HOSP IP/OBS HIGH 50: CPT | Mod: GC | Performed by: STUDENT IN AN ORGANIZED HEALTH CARE EDUCATION/TRAINING PROGRAM

## 2022-02-17 PROCEDURE — 97535 SELF CARE MNGMENT TRAINING: CPT | Mod: GO | Performed by: OCCUPATIONAL THERAPIST

## 2022-02-17 PROCEDURE — 76882 US LMTD JT/FCL EVL NVASC XTR: CPT

## 2022-02-17 PROCEDURE — 80048 BASIC METABOLIC PNL TOTAL CA: CPT | Performed by: STUDENT IN AN ORGANIZED HEALTH CARE EDUCATION/TRAINING PROGRAM

## 2022-02-17 PROCEDURE — 999N000127 HC STATISTIC PERIPHERAL IV START W US GUIDANCE

## 2022-02-17 PROCEDURE — 120N000002 HC R&B MED SURG/OB UMMC

## 2022-02-17 PROCEDURE — 36415 COLL VENOUS BLD VENIPUNCTURE: CPT | Performed by: STUDENT IN AN ORGANIZED HEALTH CARE EDUCATION/TRAINING PROGRAM

## 2022-02-17 PROCEDURE — 250N000013 HC RX MED GY IP 250 OP 250 PS 637

## 2022-02-17 PROCEDURE — 99232 SBSQ HOSP IP/OBS MODERATE 35: CPT | Performed by: PSYCHIATRY & NEUROLOGY

## 2022-02-17 PROCEDURE — 250N000011 HC RX IP 250 OP 636

## 2022-02-17 PROCEDURE — 250N000013 HC RX MED GY IP 250 OP 250 PS 637: Performed by: STUDENT IN AN ORGANIZED HEALTH CARE EDUCATION/TRAINING PROGRAM

## 2022-02-17 PROCEDURE — 97140 MANUAL THERAPY 1/> REGIONS: CPT | Mod: GO | Performed by: OCCUPATIONAL THERAPIST

## 2022-02-17 PROCEDURE — 250N000011 HC RX IP 250 OP 636: Performed by: STUDENT IN AN ORGANIZED HEALTH CARE EDUCATION/TRAINING PROGRAM

## 2022-02-17 RX ORDER — SPIRONOLACTONE 25 MG/1
50 TABLET ORAL ONCE
Status: COMPLETED | OUTPATIENT
Start: 2022-02-17 | End: 2022-02-17

## 2022-02-17 RX ORDER — PROCHLORPERAZINE 25 MG
25 SUPPOSITORY, RECTAL RECTAL EVERY 12 HOURS PRN
Status: DISCONTINUED | OUTPATIENT
Start: 2022-02-17 | End: 2022-02-21 | Stop reason: HOSPADM

## 2022-02-17 RX ORDER — ESCITALOPRAM OXALATE 10 MG/1
10 TABLET ORAL DAILY
Status: DISCONTINUED | OUTPATIENT
Start: 2022-02-18 | End: 2022-02-21 | Stop reason: HOSPADM

## 2022-02-17 RX ORDER — PROCHLORPERAZINE MALEATE 5 MG
10 TABLET ORAL EVERY 6 HOURS PRN
Status: DISCONTINUED | OUTPATIENT
Start: 2022-02-17 | End: 2022-02-21 | Stop reason: HOSPADM

## 2022-02-17 RX ORDER — SPIRONOLACTONE 25 MG/1
100 TABLET ORAL DAILY
Status: DISCONTINUED | OUTPATIENT
Start: 2022-02-18 | End: 2022-02-21 | Stop reason: HOSPADM

## 2022-02-17 RX ORDER — RIZATRIPTAN BENZOATE 10 MG/1
10 TABLET, ORALLY DISINTEGRATING ORAL
Status: COMPLETED | OUTPATIENT
Start: 2022-02-18 | End: 2022-02-18

## 2022-02-17 RX ORDER — SUMATRIPTAN 100 MG/1
100 TABLET, FILM COATED ORAL
Status: DISCONTINUED | OUTPATIENT
Start: 2022-02-17 | End: 2022-02-17

## 2022-02-17 RX ADMIN — PROCHLORPERAZINE EDISYLATE 10 MG: 5 INJECTION INTRAMUSCULAR; INTRAVENOUS at 09:58

## 2022-02-17 RX ADMIN — ATORVASTATIN CALCIUM 20 MG: 20 TABLET, FILM COATED ORAL at 09:44

## 2022-02-17 RX ADMIN — FUROSEMIDE 40 MG: 40 TABLET ORAL at 09:45

## 2022-02-17 RX ADMIN — LEVOTHYROXINE SODIUM 175 MCG: 0.03 TABLET ORAL at 09:58

## 2022-02-17 RX ADMIN — THIAMINE HCL TAB 100 MG 100 MG: 100 TAB at 09:44

## 2022-02-17 RX ADMIN — INSULIN ASPART 1 UNITS: 100 INJECTION, SOLUTION INTRAVENOUS; SUBCUTANEOUS at 09:46

## 2022-02-17 RX ADMIN — INSULIN GLARGINE 4 UNITS: 100 INJECTION, SOLUTION SUBCUTANEOUS at 13:02

## 2022-02-17 RX ADMIN — RIFAXIMIN 550 MG: 550 TABLET ORAL at 09:49

## 2022-02-17 RX ADMIN — Medication 1 CAPSULE: at 09:51

## 2022-02-17 RX ADMIN — POTASSIUM CHLORIDE 40 MEQ: 750 CAPSULE, EXTENDED RELEASE ORAL at 21:27

## 2022-02-17 RX ADMIN — PANCRELIPASE 2 CAPSULE: 24000; 76000; 120000 CAPSULE, DELAYED RELEASE PELLETS ORAL at 13:02

## 2022-02-17 RX ADMIN — APIXABAN 5 MG: 5 TABLET, FILM COATED ORAL at 21:26

## 2022-02-17 RX ADMIN — ACETAMINOPHEN 325 MG: 325 TABLET, FILM COATED ORAL at 04:42

## 2022-02-17 RX ADMIN — PANCRELIPASE 2 CAPSULE: 24000; 76000; 120000 CAPSULE, DELAYED RELEASE PELLETS ORAL at 09:47

## 2022-02-17 RX ADMIN — PANCRELIPASE 2 CAPSULE: 24000; 76000; 120000 CAPSULE, DELAYED RELEASE PELLETS ORAL at 18:59

## 2022-02-17 RX ADMIN — SPIRONOLACTONE 50 MG: 25 TABLET, FILM COATED ORAL at 09:44

## 2022-02-17 RX ADMIN — SUMATRIPTAN SUCCINATE 100 MG: 100 TABLET, FILM COATED ORAL at 06:48

## 2022-02-17 RX ADMIN — POTASSIUM CHLORIDE 40 MEQ: 750 CAPSULE, EXTENDED RELEASE ORAL at 09:48

## 2022-02-17 RX ADMIN — SPIRONOLACTONE 50 MG: 25 TABLET, FILM COATED ORAL at 17:41

## 2022-02-17 RX ADMIN — PREGABALIN 25 MG: 25 CAPSULE ORAL at 09:43

## 2022-02-17 RX ADMIN — RIFAXIMIN 550 MG: 550 TABLET ORAL at 21:26

## 2022-02-17 RX ADMIN — FOLIC ACID 1 MG: 1 TABLET ORAL at 09:44

## 2022-02-17 RX ADMIN — ONDANSETRON 4 MG: 2 INJECTION INTRAMUSCULAR; INTRAVENOUS at 05:14

## 2022-02-17 RX ADMIN — ONDANSETRON 4 MG: 2 INJECTION INTRAMUSCULAR; INTRAVENOUS at 21:41

## 2022-02-17 RX ADMIN — INSULIN ASPART 1 UNITS: 100 INJECTION, SOLUTION INTRAVENOUS; SUBCUTANEOUS at 17:11

## 2022-02-17 RX ADMIN — APIXABAN 5 MG: 5 TABLET, FILM COATED ORAL at 09:44

## 2022-02-17 ASSESSMENT — ACTIVITIES OF DAILY LIVING (ADL)
ADLS_ACUITY_SCORE: 11

## 2022-02-17 NOTE — PLAN OF CARE
"Goal Outcome Evaluation:     Plan of Care Reviewed With: patient      Time: 1100 - 0730     Reason for admit: electrolyte imbalance, hypokalemia and generalized weakness  Vitals: WNL  /62 (BP Location: Left arm)   Pulse 87   Temp 98.2  F (36.8  C) (Oral)   Resp 18   Ht 1.702 m (5' 7\")   Wt 73.7 kg (162 lb 7.7 oz)   SpO2 99%   BMI 25.45 kg/m    Activity: Activity as tolerated, SBA  Neuro: Alert and oriented x4 with forgetfulness  Mood/Behavior: Calm and cooperative  Lines/Drains: PIV on L, SL  Cardiac: WNL  Resp: Lung sounds clear  Diet: High calorie high protein diet, with fair appetite  GI/: Able to void without difficulty, had a couple of episodes of loose BM due to lactulose regimen.   Skin: Swelling on BLE and slight swelling on L arm noted just above the elbow, no pain , IV not infiltrated. Provider notified, orders for US placed for this morning.  Pain: Pt reported headache of 7/10. Tylenol given, pt requested PTA migraine meds, provider notified and sumatriptan given. Pt waiting for rimegepant sulfate from home.     New this shift: Refused warm packs to the L arm.     Plan:  Continue with POC.               "

## 2022-02-17 NOTE — PROVIDER NOTIFICATION
"Randi team paged \" Pts BP is 99/63. Do you want the spironolactone 50 mg held?\"       OK to give.   "

## 2022-02-17 NOTE — PROVIDER NOTIFICATION
Provider informed of slight swelling and tightness noted by pt. On L arm. No pain nor infiltration on IV site noted.

## 2022-02-17 NOTE — TELEPHONE ENCOUNTER
"Left message for patient to call back to schedule hepatology follow up with Dr. Cook. See message below from Dr. Scott.     \"Hello,     Could you please assist in scheduling:     Procedure/Appointment/Imaging: Clinic appt   Physician: Dr. Cook   Timing: April 2021     Dx: Cirrhosis on LT list     Thank you,   Pelon Scott   GI Fellow   Pager: [phone]\"       "

## 2022-02-17 NOTE — PROGRESS NOTES
[facility]    Daily Progress Note - Medicine Service, [facility]STANT[facility] TEAM 4       Date of Admission: 2/14/2022    Hospital Day: 3    Changes today 02/17/2022:  - Continue [place] protocol.  - Re-consulted psychiatry.  - Plan to start escitalopram.  - Increase spironolactone to 100 mg daily (from 50 mg daily).  - Discussed plan to pursue nursing home placement at care coordinator rounds.    Assessment & Plan        Ms. 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 Hemodynamically, VS WNLs. Clinically, the patient continues to be hypersomnolent and intermittently nauseated and anxious/depressed.    Hypersomnolence  DDx includes acute-on-chronic hepatic encephalopathy (although ammonia negative at 47 umol/L on 02/15/2022 at 1 AM); concussion and/or intracranial bleed after reported fall/head injury on 01/31/2022 (head CT at OSH negative for a bleed at the time, although patient high risk given her ongoing anticoagulation); infection (although no localizing evidence of one; no leukocytosis; and procalcitonin negative); uncontrolled depression/anxiety (especially given anxiety apparent on exam); or polypharmacy.  - s/p abdominal ultrasound (WITHOUT Doppler) on 02/15/2022 - no ascites  - s/p non-contrast head CT on 02/15/2022: no acute intracranial pathology  - s/p ultrasound with TIPS doppler on 02/16/2022: TIPS without stenosis  - Continue to follow blood culture collected on 02/15/2022 at 9 AM: no growth to date.  - Re-consulted psychiatry on 02/16/2022; appreciate team's assistance.  - Continue [place] protocol.    Leg swelling, bilateral  Leg pain, bilateral  Arm swelling, left, proximal  - s/p bilateral lower extremity ultrasound on 02/15/2022 - negative for DVT  - s/p IV furosemide 40 mg x1  dose on 02/15/2022 at 3:30 AM   - s/p PO furosemide 40 mg x1 dose on 02/15/2022 at 9:30 AM  - s/p PO oxycodone 5 mg on 02/15/2022 at 1 AM  - s/p IV furosemide 40 mg x1 dose on 02/15/2022  - Home PO furosemide 40 mg daily in the AM  - Continue to monitor.  - Pregabalin 25 mg daily (started 02/15/2022)    CUETO cirrhosis  Anemia, macrocytic, chronic  Thrombocytopenia, chronic  Patient follows with Dr. Cook. MELD-Na score 19 (conferring 3-4% 90-day mortality risk). The patient is currently on the transplant list. Patient and  report she is adherent to lactulose/rifaximin with ~4 bowel movements/day.  - Hepatology consulted and following; appreciate team's assistance.  - s/p abdominal ultrasound on 02/15/2022  - s/p IV furosemide 40 mg x1 dose on 02/15/2022 at 3:30 AM (as above)  - s/p PO furosemide 40 mg x1 dose on 02/15/2022 at 9:30 AM (as above)  - s/p IV furosemide 40 mg x1 dose on 02/15/2022 (as above)  - Home PO furosemide 40 mg daily in the AM (as above).  - Home lactulose 30 mL TID  - Home rifaximin 550 mg BID  - Increase spironolactone to 100 mg daily (from home dose 50 mg daily).    Hypokalemia, resolved  Potassium 4.5 on 02/15/2022 at 6:30 AM (up from 2.9 on 02/15/2022 at 1:30 AM).  - s/p PO potassium chloride 40 mEq x2 doses on 02/15/2022 at 3:30 AM and 4:45 AM  - s/p continuous infusion of IV potassium chloride 10 mEq/hr on 02/15/2022 3:30 AM - 7 AM (stopped due to burning)  - Home potassium chloride 40 mEq BID  - Trend BMP.    Nausea  - PRN ondansetron  - PRN prochlorperazine    Hypomagnesemia, resolved  Magnesium 1.6 mg/dL on 02/15/2022 at 1 AM. Magnesium up to 1.8 on 02/16/2022.  - s/p IV magnesium sulfate 2 g x1 dose    Long QT, improved  QTc 545 ms by EKG on 02/15/2022 at 12:30 AM. QTc 495 ms on 02/15/2022 at 9 AM. Note: QTc 516 ms by EKG on most recent EKG from 01/27/2022. QTc down to 486 ms on repeat EKG collected 02/16/2022.     Weakness  - Physical therapy consulted; appreciate therapist's  assistance.  - Occupational therapy consulted; appreciate therapist's assistance.    -- CHRONIC MEDICAL PROBLEMS --    Pancreatic insufficiency  Malnutrition  - Nutrition consulted and following; appreciate dietitian's assistance.  - Increased amylase-lipase-protease to 53274-13480 units x2 capsules TID with meals + 24000-76000 x1 capsule PRN with snacks/supplements on 02/16/2022.    DVT, right axillary vein, acute  Patient is managed by [facility] thrombosis clinic. She is a on a 3-month course of apixaban.  - Home apixaban 5 mg BID    Type 2 diabetes mellitus  HgbA1c 5.9% on 10/20/2021. Patient has a Dexcom CGM.  - Hold home sitaglitpin for now.  - Home insulin glargine 4 units nightly  - Medium-dose insulin aspart correction scale TID with meals and at bedtime  - Fingerstick glucose TID with meals and at bedtime  - Hypoglycemia protocol     Anxiety/Depression  - Resume most recent reported home dose of venlafaxine 75 mg nightly (from 100 mg BID recently ordered on Friday 02/11/2022).  - PO hydroxyzine 25-50 mg Q6H PRN  - Re-consulted psychiatry on 02/16/2022; appreciate team's assistance (as above).  - Plan to start escitalopram (per psychiatry).    Insomnia  - Hold home trazodone 100 mg nightly PRN.    Hypothyroidism  TSH 0.06, Free T4 elevated to 2.3. Pt follows with endocrine and recently had dose changed of levothyroxine.  - Home levothyroxine 175 mcg daily     Hyperlipidemia  - Home atorvastatin 20 mg daily    SOCIAL  Discussed patient's clinical situation and goals of care with the patient's , Roly (phone [phone]), in person at the bedside with patient present but mostly asleep on 02/17/2022.  does not feel like he has been or would be able to adequately care for the patient at home. He is comfortable with plan for patient to pursue nursing home placement. The patient understands and is not necessarily opposed, although it has also been difficult for patient to participate  actively in the discussion with her ongoing hypersomnolence.  - Will continue to keep the patient admitted.  - Plan to pursue nursing home placement.  - Writer plans to call and update the patient's mother, Kristy (phone [phone]), on 02/17/2022 per the patient's request.       Diet: High Kcal/High Protein Diet, ADULT  Snacks/Supplements Adult: Other; Send Ensure Clear (vary flavors) with breakfast and dinner meals.; With Meals  Snacks/Supplements Adult: Gelatein Plus; Between Meals    DVT Prophylaxis: Home apixaban  Rossi Catheter: Not present  Fluids: PO  Central Lines: None  Cardiac Monitoring: ACTIVE order. Indication: QTc prolonging medication (48 hours)  Code Status: Full Code      Disposition Plan   Expected Discharge: 02/18/2022   Anticipated discharge location:  Awaiting care coordination huddle  Delays:            The patient's care was discussed with the Attending Physician, Dr. Jaylene Yepez .    Porfirio Mosley MD  Medicine Service, [address]     Thursday 02/17/2022    Securely message with the Vocera Web Console (learn more here)  Text page via [facility] Paging/Directory   Please see signed in provider for up to date coverage information    Clinically Significant Risk Factors Present on Admission              # Severe Malnutrition: based on nutrition assessment   ______________________________________________________________________    Interval History   - No acute events over night.  - Nursing notes reviewed.  - Patient with migraine and nausea over night for which she received sumatriptan and ondansetron, respectively.  - Patient with interval left arm swelling for which an LUE ultrasound was ordered to rule out DVT.  - She complains of heaviness in all 4 extremities.  - She does not necessarily complain of pain in her lower extremities.  - She complains of ongoing frontal headache.    Data reviewed today: I reviewed all medications, new  labs, and imaging results over the last 24 hours.    Physical Exam   Vital Signs: Temp: 98.2  F (36.8  C) Temp src: Oral BP: 105/62 Pulse: 87   Resp: 18 SpO2: 99 % O2 Device: None (Room air)    Weight: 157 lbs 12.8 oz     General: tired-appearing woman sitting asleep but easily woken by voice in no acute distress  HEENT: infraorbital edema and hyperpigmentation  CV: regular rate/rhythm; no murmurs/rubs/gallops/clicks  Pulm: normal work of breathing on room air; clear to auscultation throughout  Abd: soft, non-tender, non-distended  Ext: resolving edema in bilateral lower extremities  Neuro: initially and intermittently asleep during interview; no apparent slurring of speech; stably mild asterixis in bilateral hands  Skin: mildly jaundiced throughout  Psych: anxious    Data    Labs, microbiology, and imaging reviewed.

## 2022-02-17 NOTE — PROVIDER NOTIFICATION
Pt reporting headache. Pt received tylenol and requesting if her home migraine med (Sumatriptan/rimegepant sulfate) can be ordered. Provider notified.

## 2022-02-17 NOTE — PROGRESS NOTES
02/16/22 1200   Quick Adds   Type of Visit Initial Occupational Therapy Evaluation   Living Environment   People in Home spouse   Current Living Arrangements house   Home Accessibility stairs to enter home   Number of Stairs, Main Entrance 3   Transportation Anticipated family or friend will provide   Living Environment Comments Pt lives with  in [place]i, all needs met on main floor, tub/shower combo with shower chair, no grab bars. Laundry room in basement which  completes.    Self-Care   Usual Activity Tolerance moderate   Current Activity Tolerance fair   Regular Exercise No   Equipment Currently Used at Home cane, straight   Fall history within last six months yes   Number of times patient has fallen within last six months 1   Activity/Exercise/Self-Care Comment Pt is mostly homebound,  provides assist with LB dressing & bathing. Uses    Instrumental Activities of Daily Living (IADL)   IADL Comments  completes most IADLs tasks    General Information   Onset of Illness/Injury or Date of Surgery 02/14/22   Referring Physician Sis Yepez MD   Patient/Family Therapy Goal Statement (OT) to get stronger    Additional Occupational Profile Info/Pertinent History of Current Problem decreased IND with I/ADLs   Existing Precautions/Restrictions fall   Left Upper Extremity (Weight-bearing Status) full weight-bearing (FWB)   Right Upper Extremity (Weight-bearing Status) full weight-bearing (FWB)   Left Lower Extremity (Weight-bearing Status) full weight-bearing (FWB)   Right Lower Extremity (Weight-bearing Status) full weight-bearing (FWB)   Heart Disease Risk Factors Medical history   General Observations and Info Pt presents with overall generalized weakness & deconditioning   Cognitive Status Examination   Orientation Status person;place  (knew month, yr; 2 days off on date )   Follows Commands WNL   Sensory   Sensory Quick Adds No deficits were identified   Integumentary/Edema    Integumentary/Edema no deficits were identifed   Range of Motion Comprehensive   General Range of Motion no range of motion deficits identified   Strength Comprehensive (MMT)   Comment, General Manual Muscle Testing (MMT) Assessment generalized weakness, 3-/5   Coordination   Upper Extremity Coordination No deficits were identified   Bed Mobility   Comment (Bed Mobility) supine<>sit SBA    Clinical Impression   Criteria for Skilled Therapeutic Interventions Met (OT) Yes, treatment indicated   OT Diagnosis Per H&P: 51 year old female admitted on 2/14/2022. She has a past medical history significant for type 2 diabetes, seizures, unspecified altered mental status, liver cirrhosis 2/2 to CUETO, pancytopenia, pleural effusion, Raynaud's phenomenon, gastric bypass, hepatic encephalopathy, and GEORGETTE who presents to the emergency department for evaluation of leg swelling and fatigue.   OT Problem List-Impairments impacting ADL problems related to;activity tolerance impaired;mobility;strength   Assessment of Occupational Performance 3-5 Performance Deficits   Identified Performance Deficits bathing, dressing, cognition, strength   Planned Therapy Interventions (OT) ADL retraining;strengthening;transfer training   Clinical Decision Making Complexity (OT) low complexity   Predicted Duration of Therapy 2 weeks    Anticipated Equipment Needs Upon Discharge (OT)   (TBD)   Risk & Benefits of therapy have been explained evaluation/treatment results reviewed   Clinical Impression Comments Pt is appropriate for skilled OT services to address deficits in functional tasks & mobility   OT Discharge Planning   OT Discharge Recommendation (DC Rec) home with assist;home with home care occupational therapy   OT Rationale for DC Rec Pt is below baseline with functional tasks & mobility. Limiting factors are poor endurance & decreased activity tolerance.  is able to assist with tub transfer & LB dressing as needed.    Total Evaluation  Time (Minutes)   Total Evaluation Time (Minutes) 10   OT Goals   Therapy Frequency (OT) 6 times/wk   OT Predicated Duration/Target Date for Goal Attainment 03/04/22   OT: Lower Body Dressing Modified independent;using adaptive equipment   OT: Lower Body Bathing Modified independent;using adaptive equipment   OT: Home Management Modified independent;with light demand household tasks   OT: Cognitive Patient/caregiver will verbalize understanding of cognitive assessment results/recommendations as needed for safe discharge planning   OT: Perform aerobic activity with stable cardiovascular response intermittent activity;10 minutes;ambulation

## 2022-02-17 NOTE — CONSULTS
Consult Date: 02/17/2022    PSYCHIATRY CONSULTATION    IDENTIFICATION:  Ms. Susan Puckett is a 51-year-old  white female who is suffering from nonalcoholic liver cirrhosis.  She also has a significant major depressive disorder.  I am asked to evaluate her depression by Dr. Mosley.    Prior to interviewing this patient, I note that psychiatric consultations were attempted by Alba Holley and I also reviewed the electronic medical record and note that Dr. Augustin had been providing outpatient psychiatric consultations as of 11/2020.  Dr. Augustin had placed the patient on Effexor, but his notes suggest that if Effexor does not turn out to be satisfactory, the patient could consider switching to Lexapro 10 mg.    On my interview today, the patient reports that she is quite dysphoric.  She says her life is extremely difficult and she is really hoping that she can get a liver transplant.  She also reports some confusion.  She is alert and oriented x3.  She does fairly well with bedside questioning, but she recently had significant delirium and currently reports she does not feel that she is thinking clearly.  It is difficult to know how much of her misery is secondary to major depressive disorder versus her hepatic encephalopathy, but it does seem reasonable to give Lexapro a trial.  Therefore, today I will be recommending Lexapro 10 mg p.o. q.a.m. and discontinuing the Effexor.  I note that the patient's QTc is 486 and that should be followed if the patient is on Lexapro, though at 10 mg, it is unlikely to cause QTc prolongation.    MENTAL STATUS EXAM:  On my interview, the patient was sitting in her emergency room [facility].  Her  was in the room and she was obviously very supportive.  Her mood is quite dysphoric.  Her affect is very sad.  Her speech is coherent and goal oriented.  Her associations were tight and her thought processes logical and linear, despite the fact that she reports she is not  thinking clearly.  Recent and remote memory were both somewhat impaired.  She had no recollection of talking to Dr. Smith and she reports that she had never heard of Lexapro.  Her concentration and use of language were within normal limits.  She is alert and oriented x3.  Insight and judgment are currently intact, but have certainly been variable.  Her mental state has been waxing and waning.  I caught her at a very good time.      Recent vital signs include a blood pressure of 105/62, temperature of 98.2, pulse of 87, respiration rate of 18 with 99% oxygen saturation.    IMPRESSION:  Delirium secondary to hepatic encephalopathy, currently improved, and major depressive disorder, recurrent, severe, without psychosis.    RECOMMENDATIONS:  Discontinue Effexor and start Lexapro 10 mg.    Mike Cornejo MD        D: 2022   T: 2022   MT: GABRIEL    Name:     SHARMILA CONNER  MRN:      [id number]        Account:      [id number]   :      1970           Consult Date: 2022     Document: [id number]

## 2022-02-17 NOTE — PROVIDER NOTIFICATION
Provider text paged Re: Patient is nauseous. Received PRN zofran at 0514. Patient does not have any other antiemetic ordered. Please advise.

## 2022-02-18 ENCOUNTER — APPOINTMENT (OUTPATIENT)
Dept: OCCUPATIONAL THERAPY | Facility: CLINIC | Age: 52
DRG: 441 | End: 2022-02-18
Payer: COMMERCIAL

## 2022-02-18 ENCOUNTER — TELEPHONE (OUTPATIENT)
Dept: GASTROENTEROLOGY | Facility: CLINIC | Age: 52
End: 2022-02-18
Payer: COMMERCIAL

## 2022-02-18 ENCOUNTER — APPOINTMENT (OUTPATIENT)
Dept: PHYSICAL THERAPY | Facility: CLINIC | Age: 52
DRG: 441 | End: 2022-02-18
Payer: COMMERCIAL

## 2022-02-18 LAB
ANION GAP SERPL CALCULATED.3IONS-SCNC: 5 MMOL/L (ref 3–14)
BUN SERPL-MCNC: 8 MG/DL (ref 7–30)
CALCIUM SERPL-MCNC: 8 MG/DL (ref 8.5–10.1)
CHLORIDE BLD-SCNC: 108 MMOL/L (ref 94–109)
CO2 SERPL-SCNC: 23 MMOL/L (ref 20–32)
CREAT SERPL-MCNC: 0.81 MG/DL (ref 0.52–1.04)
GFR SERPL CREATININE-BSD FRML MDRD: 88 ML/MIN/1.73M2
GLUCOSE BLD-MCNC: 151 MG/DL (ref 70–99)
GLUCOSE BLDC GLUCOMTR-MCNC: 131 MG/DL (ref 70–99)
GLUCOSE BLDC GLUCOMTR-MCNC: 181 MG/DL (ref 70–99)
GLUCOSE BLDC GLUCOMTR-MCNC: 205 MG/DL (ref 70–99)
GLUCOSE BLDC GLUCOMTR-MCNC: 226 MG/DL (ref 70–99)
GLUCOSE BLDC GLUCOMTR-MCNC: 282 MG/DL (ref 70–99)
HOLD SPECIMEN: NORMAL
POTASSIUM BLD-SCNC: 4.9 MMOL/L (ref 3.4–5.3)
SODIUM SERPL-SCNC: 136 MMOL/L (ref 133–144)

## 2022-02-18 PROCEDURE — 97530 THERAPEUTIC ACTIVITIES: CPT | Mod: GP

## 2022-02-18 PROCEDURE — 97535 SELF CARE MNGMENT TRAINING: CPT | Mod: GO | Performed by: OCCUPATIONAL THERAPIST

## 2022-02-18 PROCEDURE — 97140 MANUAL THERAPY 1/> REGIONS: CPT | Mod: GO | Performed by: OCCUPATIONAL THERAPIST

## 2022-02-18 PROCEDURE — 87205 SMEAR GRAM STAIN: CPT | Performed by: INTERNAL MEDICINE

## 2022-02-18 PROCEDURE — 99207 PR CDG-MDM COMPONENT: MEETS MODERATE - DOWN CODED: CPT | Performed by: STUDENT IN AN ORGANIZED HEALTH CARE EDUCATION/TRAINING PROGRAM

## 2022-02-18 PROCEDURE — 36415 COLL VENOUS BLD VENIPUNCTURE: CPT | Performed by: STUDENT IN AN ORGANIZED HEALTH CARE EDUCATION/TRAINING PROGRAM

## 2022-02-18 PROCEDURE — 80048 BASIC METABOLIC PNL TOTAL CA: CPT | Performed by: STUDENT IN AN ORGANIZED HEALTH CARE EDUCATION/TRAINING PROGRAM

## 2022-02-18 PROCEDURE — 83036 HEMOGLOBIN GLYCOSYLATED A1C: CPT | Performed by: STUDENT IN AN ORGANIZED HEALTH CARE EDUCATION/TRAINING PROGRAM

## 2022-02-18 PROCEDURE — 250N000013 HC RX MED GY IP 250 OP 250 PS 637: Performed by: STUDENT IN AN ORGANIZED HEALTH CARE EDUCATION/TRAINING PROGRAM

## 2022-02-18 PROCEDURE — 99232 SBSQ HOSP IP/OBS MODERATE 35: CPT | Mod: GC | Performed by: STUDENT IN AN ORGANIZED HEALTH CARE EDUCATION/TRAINING PROGRAM

## 2022-02-18 PROCEDURE — 250N000013 HC RX MED GY IP 250 OP 250 PS 637

## 2022-02-18 PROCEDURE — 120N000002 HC R&B MED SURG/OB UMMC

## 2022-02-18 PROCEDURE — 97116 GAIT TRAINING THERAPY: CPT | Mod: GP

## 2022-02-18 RX ADMIN — Medication 1 CAPSULE: at 08:25

## 2022-02-18 RX ADMIN — RIZATRIPTAN BENZOATE 10 MG: 10 TABLET, ORALLY DISINTEGRATING ORAL at 08:27

## 2022-02-18 RX ADMIN — FUROSEMIDE 40 MG: 40 TABLET ORAL at 08:22

## 2022-02-18 RX ADMIN — ACETAMINOPHEN 325 MG: 325 TABLET, FILM COATED ORAL at 23:34

## 2022-02-18 RX ADMIN — POTASSIUM CHLORIDE 40 MEQ: 750 CAPSULE, EXTENDED RELEASE ORAL at 21:15

## 2022-02-18 RX ADMIN — APIXABAN 5 MG: 5 TABLET, FILM COATED ORAL at 21:15

## 2022-02-18 RX ADMIN — Medication 2.5 MG: at 03:05

## 2022-02-18 RX ADMIN — SPIRONOLACTONE 100 MG: 25 TABLET, FILM COATED ORAL at 08:22

## 2022-02-18 RX ADMIN — INSULIN ASPART 1 UNITS: 100 INJECTION, SOLUTION INTRAVENOUS; SUBCUTANEOUS at 12:48

## 2022-02-18 RX ADMIN — PREGABALIN 25 MG: 25 CAPSULE ORAL at 08:22

## 2022-02-18 RX ADMIN — PANCRELIPASE 2 CAPSULE: 24000; 76000; 120000 CAPSULE, DELAYED RELEASE PELLETS ORAL at 08:22

## 2022-02-18 RX ADMIN — APIXABAN 5 MG: 5 TABLET, FILM COATED ORAL at 08:22

## 2022-02-18 RX ADMIN — FOLIC ACID 1 MG: 1 TABLET ORAL at 08:22

## 2022-02-18 RX ADMIN — PANCRELIPASE 2 CAPSULE: 24000; 76000; 120000 CAPSULE, DELAYED RELEASE PELLETS ORAL at 12:47

## 2022-02-18 RX ADMIN — INSULIN GLARGINE 4 UNITS: 100 INJECTION, SOLUTION SUBCUTANEOUS at 12:26

## 2022-02-18 RX ADMIN — RIFAXIMIN 550 MG: 550 TABLET ORAL at 22:13

## 2022-02-18 RX ADMIN — INSULIN ASPART 2 UNITS: 100 INJECTION, SOLUTION INTRAVENOUS; SUBCUTANEOUS at 16:11

## 2022-02-18 RX ADMIN — PANCRELIPASE 2 CAPSULE: 24000; 76000; 120000 CAPSULE, DELAYED RELEASE PELLETS ORAL at 18:40

## 2022-02-18 RX ADMIN — THIAMINE HCL TAB 100 MG 100 MG: 100 TAB at 08:22

## 2022-02-18 RX ADMIN — ATORVASTATIN CALCIUM 20 MG: 20 TABLET, FILM COATED ORAL at 08:22

## 2022-02-18 RX ADMIN — ESCITALOPRAM OXALATE 10 MG: 10 TABLET ORAL at 08:22

## 2022-02-18 RX ADMIN — POTASSIUM CHLORIDE 40 MEQ: 750 CAPSULE, EXTENDED RELEASE ORAL at 08:24

## 2022-02-18 RX ADMIN — RIFAXIMIN 550 MG: 550 TABLET ORAL at 08:23

## 2022-02-18 RX ADMIN — ACETAMINOPHEN 325 MG: 325 TABLET, FILM COATED ORAL at 03:05

## 2022-02-18 RX ADMIN — LEVOTHYROXINE SODIUM 175 MCG: 0.03 TABLET ORAL at 08:19

## 2022-02-18 ASSESSMENT — ACTIVITIES OF DAILY LIVING (ADL)
EQUIPMENT_CURRENTLY_USED_AT_HOME: CANE, STRAIGHT
DIFFICULTY_EATING/SWALLOWING: NO
ADLS_ACUITY_SCORE: 11
ADLS_ACUITY_SCORE: 11
ADLS_ACUITY_SCORE: 12
TRANSFERRING: 0-->INDEPENDENT
TOILETING_ISSUES: NO
DRESS: 1-->ASSISTANCE (EQUIPMENT/PERSON) NEEDED
ADLS_ACUITY_SCORE: 11
ADLS_ACUITY_SCORE: 12
CONCENTRATING,_REMEMBERING_OR_MAKING_DECISIONS_DIFFICULTY: NO
WALKING_OR_CLIMBING_STAIRS_DIFFICULTY: YES
ADLS_ACUITY_SCORE: 12
ADLS_ACUITY_SCORE: 11
DRESSING/BATHING: BATHING DIFFICULTY, ASSISTANCE 1 PERSON
ADLS_ACUITY_SCORE: 12
WEAR_GLASSES_OR_BLIND: YES
ADLS_ACUITY_SCORE: 11
DOING_ERRANDS_INDEPENDENTLY_DIFFICULTY: NO
ADLS_ACUITY_SCORE: 12
WALKING_OR_CLIMBING_STAIRS: AMBULATION DIFFICULTY, ASSISTANCE 1 PERSON
ADLS_ACUITY_SCORE: 12
ADLS_ACUITY_SCORE: 11
ADLS_ACUITY_SCORE: 12
DRESSING/BATHING_DIFFICULTY: YES
DRESS: 0-->ASSISTANCE NEEDED (DEVELOPMENTALLY APPROPRIATE)
VISION_MANAGEMENT: GLASSES
ADLS_ACUITY_SCORE: 12
ADLS_ACUITY_SCORE: 11
DRESSING/BATHING_MANAGEMENT: HUSBAND ASSISTS
TRANSFERRING: 0-->INDEPENDENT
ADLS_ACUITY_SCORE: 11
ADLS_ACUITY_SCORE: 11
ADLS_ACUITY_SCORE: 12
BATHING: 1-->ASSISTANCE NEEDED
ADLS_ACUITY_SCORE: 12
ADLS_ACUITY_SCORE: 12

## 2022-02-18 NOTE — PLAN OF CARE
"Goal Outcome Evaluation:    Plan of Care Reviewed With: patient          Outcome Evaluation: Patient alert and oriented with moments of sleepiness.  Neuro: Alert and oriented  Cardiac: On tele, normal sinus rhythm   Respiratory:WNL  GI/: Last bg 205  Diet/appetite: Regular  Activity: Ambulates with a cane, stand by assist.  Pain: Received oxycodone for restless legs.  Skin:WNL  Lines:PIV    /63 (BP Location: Left arm)   Pulse 81   Temp 98  F (36.7  C) (Oral)   Resp 16   Ht 1.702 m (5' 7\")   Wt 71.6 kg (157 lb 12.8 oz)   SpO2 99%   BMI 24.71 kg/m           "

## 2022-02-18 NOTE — PROVIDER NOTIFICATION
Provider text paged Re: Patient's  called and is requesting a call back to discuss a few things about the patient. Best call back no is [phone]. Thank you.

## 2022-02-18 NOTE — PROGRESS NOTES
SPIRITUAL HEALTH SERVICES  SPIRITUAL ASSESSMENT Progress Note (On-call)  [facility] OBS    REFERRAL SOURCE: Received routine consult for emotional support    Visited with pt Susan to offer spiritual support and prayer. Susan was raised [employer] and currently attends [employer] virtually. Susan spoke about her struggles with liver illness starting in 2019. Susan names prayer, devotional/scriptural reading, and listening to Orthodoxy music as sources of hope and meaning. Due to liver illness, Susan reports struggling to stay awake and enjoy the things that loves to do. Susan says she sometimes feels guilty about not praying more; I reassured her that God knows our hearts, and offered words of comfort. We prayed together at Susan's request. I informed her that  support continues to be available upon request during her hospitalization.         Arie Pacheco  Chaplain Resident  Please page the on call  through [facility] or place an Inpatient Spiritual Health consult as needed for support. STAT or ASAP inpatient consult orders will roll to the on call pager. Thank you!

## 2022-02-18 NOTE — TELEPHONE ENCOUNTER
"2nd Attempt:    Left message for patient to call back to schedule hepatology follow up with Dr. Cook. See message below from Dr. Scott.      \"Hello,     Could you please assist in scheduling:     Procedure/Appointment/Imaging: Clinic appt   Physician: Dr. Cook   Timing: April 2021     Dx: Cirrhosis on LT list     Thank you,   Pelon Scott   GI Fellow   Pager: [phone]\"   "

## 2022-02-18 NOTE — PROGRESS NOTES
[facility]    Daily Progress Note - Medicine Service, CONSTANTIN TEAM 4       Date of Admission: 2/14/2022    Hospital Day: 4    Changes today 02/18/2022:  - Continue [place] protocol.  - Started escitalopram.  - Discontinue telemetry.  - Replaced PRN sumatriptan with rizatriptan (for migraine).  - Working with social work to find more resources for home cares v. nursing home.    Assessment & Plan        Ms. 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    Hypersomnolence, improving  DDx includes acute-on-chronic hepatic encephalopathy (although ammonia negative at 47 umol/L on 02/15/2022 at 1 AM); concussion and/or intracranial bleed after reported fall/head injury on 01/31/2022 (head CT at OSH negative for a bleed at the time, although patient high risk given her ongoing anticoagulation); infection (although no localizing evidence of one; no leukocytosis; and procalcitonin negative); uncontrolled depression/anxiety (especially given anxiety apparent on exam); or polypharmacy.  - s/p abdominal ultrasound (WITHOUT Doppler) on 02/15/2022 - no ascites  - s/p non-contrast head CT on 02/15/2022: no acute intracranial pathology  - s/p ultrasound with TIPS doppler on 02/16/2022: TIPS without stenosis  - Continue to follow blood culture collected on 02/15/2022 at 9 AM: no growth to date.  - Re-consulted psychiatry on 02/16/2022; appreciate team's assistance.  - Continue [place] protocol.    Leg swelling, bilateral  Leg pain, bilateral  Arm swelling, left, proximal  - s/p bilateral lower extremity ultrasound on 02/15/2022 - negative for DVT  - s/p IV furosemide 40 mg x1 dose on 02/15/2022 at 3:30 AM   - s/p PO furosemide 40 mg x1 dose on 02/15/2022 at 9:30 AM  - s/p PO oxycodone 5 mg on  02/15/2022 at 1 AM  - s/p IV furosemide 40 mg x1 dose on 02/15/2022  - Home PO furosemide 40 mg daily in the AM  - Continue to monitor.  - Pregabalin 25 mg daily (started 02/15/2022)    CUETO cirrhosis  Anemia, macrocytic, chronic  Thrombocytopenia, chronic  Patient follows with Dr. Cook. MELD-Na score 19 (conferring 3-4% 90-day mortality risk). The patient is currently on the transplant list. Patient and  report she is adherent to lactulose/rifaximin with ~4 bowel movements/day.  - Hepatology consulted and following; appreciate team's assistance.  - s/p abdominal ultrasound on 02/15/2022  - s/p IV furosemide 40 mg x1 dose on 02/15/2022 at 3:30 AM (as above)  - s/p PO furosemide 40 mg x1 dose on 02/15/2022 at 9:30 AM (as above)  - s/p IV furosemide 40 mg x1 dose on 02/15/2022 (as above)  - Home PO furosemide 40 mg daily in the AM (as above).  - Home lactulose 30 mL TID  - Home rifaximin 550 mg BID  - Spironolactone 100 mg daily (increased from home dose 50 mg daily on 02/17/2022)    Hypokalemia, resolved  Potassium 4.5 on 02/15/2022 at 6:30 AM (up from 2.9 on 02/15/2022 at 1:30 AM).  - s/p PO potassium chloride 40 mEq x2 doses on 02/15/2022 at 3:30 AM and 4:45 AM  - s/p continuous infusion of IV potassium chloride 10 mEq/hr on 02/15/2022 3:30 AM - 7 AM (stopped due to burning)  - Home potassium chloride 40 mEq BID  - Discontinue telemetry.  - Trend BMP.    Nausea  - PRN ondansetron  - PRN prochlorperazine    Hypomagnesemia, resolved  Magnesium 1.6 mg/dL on 02/15/2022 at 1 AM. Magnesium up to 1.8 on 02/16/2022.  - s/p IV magnesium sulfate 2 g x1 dose    Long QT, improved  QTc 545 ms by EKG on 02/15/2022 at 12:30 AM. QTc 495 ms on 02/15/2022 at 9 AM. Note: QTc 516 ms by EKG on most recent EKG from 01/27/2022. QTc down to 486 ms on repeat EKG collected 02/16/2022.     Weakness  - Physical therapy consulted; appreciate therapist's assistance.  - Occupational therapy consulted; appreciate therapist's  assistance.    -- CHRONIC MEDICAL PROBLEMS --    Pancreatic insufficiency  Malnutrition  - Nutrition consulted and following; appreciate dietitian's assistance.  - Increased amylase-lipase-protease to 50845-12429 units x2 capsules TID with meals + 24000-76000 x1 capsule PRN with snacks/supplements on 02/16/2022.    DVT, right axillary vein, acute  [facility]. She is a on a 3-month course of apixaban.  - Home apixaban 5 mg BID    Type 2 diabetes mellitus  HgbA1c 5.9% on 10/20/2021. Patient has a Dexcom CGM.  - Hold home sitaglitpin for now.  - Home insulin glargine 4 units nightly  - Medium-dose insulin aspart correction scale TID with meals and at bedtime  - Fingerstick glucose TID with meals and at bedtime  - Hypoglycemia protocol     Anxiety/Depression  - Resume most recent reported home dose of venlafaxine 75 mg nightly (from 100 mg BID recently ordered on Friday 02/11/2022).  - PO hydroxyzine 25-50 mg Q6H PRN  - Re-consulted psychiatry on 02/16/2022; appreciate team's assistance (as above).  - Escitalopram 10 mg daily (started on 02/18/2022)    Insomnia  - Hold home trazodone 100 mg nightly PRN.    Hypothyroidism  TSH 0.06, Free T4 elevated to 2.3. Pt follows with endocrine and recently had dose changed of levothyroxine.  - Home levothyroxine 175 mcg daily     Hyperlipidemia  - Home atorvastatin 20 mg daily    SOCIAL  Discussed patient's clinical situation and goals of care with the patient's , Roly (phone [phone]), in person at the bedside with patient present but mostly asleep on 02/17/2022.  does not feel like he has been or would be able to adequately care for the patient at home. He is comfortable with plan for patient to pursue nursing home placement. The patient understands and is not necessarily opposed, although it has also been difficult for patient to participate actively in the discussion with her ongoing hypersomnolence.  -  Will continue to keep the patient admitted.  - Writer updated the patient's  at the bedside on 02/17/2022.  - At care coordinator rounds on 02/18/2022 requested social work's assistance with finding more resources for home cares v. nursing home placement.  - Writer called and updated the patient's mother, Kristy (phone [phone]), on 02/17/2022 per the patient's request.  - Writer called and updated the patient's  on the afternoon of 02/18/2022.       Diet: High Kcal/High Protein Diet, ADULT  Snacks/Supplements Adult: Other; Send Ensure Clear (vary flavors) with breakfast and dinner meals.; With Meals  Snacks/Supplements Adult: Gelatein Plus; Between Meals    DVT Prophylaxis: Home apixaban  Rossi Catheter: Not present  Fluids: PO  Central Lines: None  Cardiac Monitoring: None  Code Status: Full Code      Disposition Plan   Expected Discharge: 02/21/2022   Anticipated discharge location:  Awaiting care coordination huddle  Delays:            The patient's care was discussed with the Attending Physician, Dr. Jaylene Yepez .    Sis Yepez  Medicine Service, [facility] TEAM 4  [facility]     Friday 02/18/2022    Securely message with the Vocera Web Console (learn more here)  Text page via [facility] Paging/Directory   Please see signed in provider for up to date coverage information    Clinically Significant Risk Factors Present on Admission                # Severe Malnutrition: based on nutrition assessment   ______________________________________________________________________    Interval History   - No acute events over night.  - Nursing notes reviewed.  - Patient much more awake, alert, and interactive this morning.  - She was seen smiling and feeling largely better.  - She and nursing mentioned some twitching of her face and leg today.  - Patient has continued to require PRN medications for nausea and headache.  - She received a single dose of rizatriptan for  "migraine this morning shortly before she was seen.  - She has ongoing left arm swelling (presumably 2/2 infiltration of previously placed IV).  - She continues to complain of ongoing heaviness in all 4 extremities that is perhaps slightly improved as compared to yesterday.  - She fears possible placement at a nursing home because her sister reportedly required one for her \"diabetes\" and received \"inadequate\" care.  - She did, however, mention a grandmother who received excellent cares at a nursing home.  - She also expressed fear re: the elderly and COVID at nursing homes.  - She reports that her mother worked at a nursing home and so she spent a lot of time in a nursing home as a child.    Data reviewed today: I reviewed all medications, new labs, and imaging results over the last 24 hours.    Physical Exam   Vital Signs: Temp: 97.8  F (36.6  C) Temp src: Oral BP: 106/64 Pulse: 81   Resp: 18 SpO2: 100 % O2 Device: None (Room air)    Weight: 157 lbs 12.8 oz     General: well-appearing chronically ill woman sitting up, awake, alert, interactive, and in no apparent distress; still observed to be occasionally drifting off to sleep in conversation but easily awoken by voice  HEENT: infraorbital hyperpigmentation  CV: regular rate/rhythm; no murmurs/rubs/gallops/clicks  Pulm: normal work of breathing on room air; clear to auscultation throughout  Abd: soft, non-tender, non-distended  Ext: wraps over bilateral lower extremities up to knees; edema appears to be improving  Neuro: awake, alert, interactive; intermittently falling asleep (as above); no apparent slurring of speech; improved asterixis in bilateral hands  Skin: mildly jaundiced throughout  Psych: more calm and easily consoled on 02/18/2022    Data    Labs reviewed. No new microbiology or imaging reviewed.  "

## 2022-02-18 NOTE — PLAN OF CARE
"Goal Outcome Evaluation:  Shift: 3026-9009  Plan of Care Reviewed With: patient   Activity: Activity as tolerated, SBA  Neuro: Alert and oriented x4   Mood/Behavior: Calm and cooperative  Lines/Drains: PIV on L, SL  Cardiac: WNL  Resp: Lung sounds clear  Diet: High calorie high protein diet, fair appetite  GI/: Able to void without difficulty, had a couple of episodes of loose BM   Skin: Swelling on BLE and slight swelling on L arm noted just above the elbow, no pain   Pain: Patient reported headache, declined tylenol. Brought home Rizatriptan, per doctor, should not be given till tomorrow morning since she already had sumatriptan.    Blood pressure 99/63, pulse 78, temperature 97.4  F (36.3  C), temperature source Oral, resp. rate 18, height 1.702 m (5' 7\"), weight 71.6 kg (157 lb 12.8 oz), SpO2 100 %, not currently breastfeeding.               "

## 2022-02-19 LAB
ATRIAL RATE - MUSE: 88 BPM
DIASTOLIC BLOOD PRESSURE - MUSE: NORMAL MMHG
GLUCOSE BLDC GLUCOMTR-MCNC: 158 MG/DL (ref 70–99)
GLUCOSE BLDC GLUCOMTR-MCNC: 187 MG/DL (ref 70–99)
GLUCOSE BLDC GLUCOMTR-MCNC: 210 MG/DL (ref 70–99)
GLUCOSE BLDC GLUCOMTR-MCNC: 305 MG/DL (ref 70–99)
GLUCOSE BLDC GLUCOMTR-MCNC: 323 MG/DL (ref 70–99)
INTERPRETATION ECG - MUSE: NORMAL
P AXIS - MUSE: 26 DEGREES
POTASSIUM BLD-SCNC: 4.6 MMOL/L (ref 3.4–5.3)
PR INTERVAL - MUSE: 132 MS
QRS DURATION - MUSE: 80 MS
QT - MUSE: 402 MS
QTC - MUSE: 486 MS
R AXIS - MUSE: -17 DEGREES
SYSTOLIC BLOOD PRESSURE - MUSE: NORMAL MMHG
T AXIS - MUSE: 0 DEGREES
VENTRICULAR RATE- MUSE: 88 BPM

## 2022-02-19 PROCEDURE — 99207 PR CDG-MDM COMPONENT: MEETS MODERATE - DOWN CODED: CPT | Performed by: STUDENT IN AN ORGANIZED HEALTH CARE EDUCATION/TRAINING PROGRAM

## 2022-02-19 PROCEDURE — 250N000013 HC RX MED GY IP 250 OP 250 PS 637: Performed by: STUDENT IN AN ORGANIZED HEALTH CARE EDUCATION/TRAINING PROGRAM

## 2022-02-19 PROCEDURE — 250N000013 HC RX MED GY IP 250 OP 250 PS 637

## 2022-02-19 PROCEDURE — 120N000002 HC R&B MED SURG/OB UMMC

## 2022-02-19 PROCEDURE — 99232 SBSQ HOSP IP/OBS MODERATE 35: CPT | Mod: GC | Performed by: STUDENT IN AN ORGANIZED HEALTH CARE EDUCATION/TRAINING PROGRAM

## 2022-02-19 PROCEDURE — 82607 VITAMIN B-12: CPT | Performed by: STUDENT IN AN ORGANIZED HEALTH CARE EDUCATION/TRAINING PROGRAM

## 2022-02-19 PROCEDURE — 84132 ASSAY OF SERUM POTASSIUM: CPT | Performed by: STUDENT IN AN ORGANIZED HEALTH CARE EDUCATION/TRAINING PROGRAM

## 2022-02-19 PROCEDURE — 36415 COLL VENOUS BLD VENIPUNCTURE: CPT | Performed by: STUDENT IN AN ORGANIZED HEALTH CARE EDUCATION/TRAINING PROGRAM

## 2022-02-19 RX ORDER — NALOXONE HYDROCHLORIDE 0.4 MG/ML
0.4 INJECTION, SOLUTION INTRAMUSCULAR; INTRAVENOUS; SUBCUTANEOUS
Status: DISCONTINUED | OUTPATIENT
Start: 2022-02-19 | End: 2022-02-21 | Stop reason: HOSPADM

## 2022-02-19 RX ORDER — NALOXONE HYDROCHLORIDE 0.4 MG/ML
0.2 INJECTION, SOLUTION INTRAMUSCULAR; INTRAVENOUS; SUBCUTANEOUS
Status: DISCONTINUED | OUTPATIENT
Start: 2022-02-19 | End: 2022-02-21 | Stop reason: HOSPADM

## 2022-02-19 RX ADMIN — LEVOTHYROXINE SODIUM 175 MCG: 0.03 TABLET ORAL at 08:34

## 2022-02-19 RX ADMIN — Medication 1 CAPSULE: at 08:35

## 2022-02-19 RX ADMIN — LACTULOSE 30 ML: 10 SOLUTION ORAL at 18:18

## 2022-02-19 RX ADMIN — THIAMINE HCL TAB 100 MG 100 MG: 100 TAB at 08:35

## 2022-02-19 RX ADMIN — ATORVASTATIN CALCIUM 20 MG: 20 TABLET, FILM COATED ORAL at 08:35

## 2022-02-19 RX ADMIN — SPIRONOLACTONE 100 MG: 25 TABLET, FILM COATED ORAL at 08:34

## 2022-02-19 RX ADMIN — INSULIN ASPART 1 UNITS: 100 INJECTION, SOLUTION INTRAVENOUS; SUBCUTANEOUS at 08:33

## 2022-02-19 RX ADMIN — LACTULOSE 30 ML: 10 SOLUTION ORAL at 08:33

## 2022-02-19 RX ADMIN — APIXABAN 5 MG: 5 TABLET, FILM COATED ORAL at 08:35

## 2022-02-19 RX ADMIN — PANCRELIPASE 2 CAPSULE: 24000; 76000; 120000 CAPSULE, DELAYED RELEASE PELLETS ORAL at 08:33

## 2022-02-19 RX ADMIN — RIFAXIMIN 550 MG: 550 TABLET ORAL at 08:35

## 2022-02-19 RX ADMIN — PREGABALIN 25 MG: 25 CAPSULE ORAL at 08:34

## 2022-02-19 RX ADMIN — INSULIN GLARGINE 4 UNITS: 100 INJECTION, SOLUTION SUBCUTANEOUS at 13:17

## 2022-02-19 RX ADMIN — PANCRELIPASE 2 CAPSULE: 24000; 76000; 120000 CAPSULE, DELAYED RELEASE PELLETS ORAL at 13:18

## 2022-02-19 RX ADMIN — FUROSEMIDE 40 MG: 40 TABLET ORAL at 08:35

## 2022-02-19 RX ADMIN — APIXABAN 5 MG: 5 TABLET, FILM COATED ORAL at 21:06

## 2022-02-19 RX ADMIN — RIFAXIMIN 550 MG: 550 TABLET ORAL at 21:06

## 2022-02-19 RX ADMIN — ESCITALOPRAM OXALATE 10 MG: 10 TABLET ORAL at 08:34

## 2022-02-19 RX ADMIN — LACTULOSE 30 ML: 10 SOLUTION ORAL at 21:06

## 2022-02-19 RX ADMIN — ACETAMINOPHEN 325 MG: 325 TABLET, FILM COATED ORAL at 06:04

## 2022-02-19 RX ADMIN — POTASSIUM CHLORIDE 40 MEQ: 750 CAPSULE, EXTENDED RELEASE ORAL at 21:06

## 2022-02-19 RX ADMIN — POTASSIUM CHLORIDE 40 MEQ: 750 CAPSULE, EXTENDED RELEASE ORAL at 08:34

## 2022-02-19 RX ADMIN — PANCRELIPASE 2 CAPSULE: 24000; 76000; 120000 CAPSULE, DELAYED RELEASE PELLETS ORAL at 18:18

## 2022-02-19 RX ADMIN — INSULIN ASPART 4 UNITS: 100 INJECTION, SOLUTION INTRAVENOUS; SUBCUTANEOUS at 18:19

## 2022-02-19 RX ADMIN — Medication 1 MG: at 23:52

## 2022-02-19 RX ADMIN — FOLIC ACID 1 MG: 1 TABLET ORAL at 08:35

## 2022-02-19 ASSESSMENT — ACTIVITIES OF DAILY LIVING (ADL)
TRANSFERRING: 0-->ASSISTANCE NEEDED (DEVELOPMETNALLY APPROPRIATE)
SWALLOWING: 0-->SWALLOWS FOODS/LIQUIDS WITHOUT DIFFICULTY
ADLS_ACUITY_SCORE: 16
DOING_ERRANDS_INDEPENDENTLY_DIFFICULTY: YES
ADLS_ACUITY_SCORE: 16
BATHING: 1-->ASSISTANCE NEEDED
ADLS_ACUITY_SCORE: 16
TRANSFERRING: 1-->ASSISTANCE (EQUIPMENT/PERSON) NEEDED
ADLS_ACUITY_SCORE: 16
DIFFICULTY_EATING/SWALLOWING: NO
ADLS_ACUITY_SCORE: 14
HEARING_DIFFICULTY_OR_DEAF: NO
ADLS_ACUITY_SCORE: 16
SWALLOWING: 0-->SWALLOWS FOODS/LIQUIDS WITHOUT DIFFICULTY (DEVELOPMENTALLY APPROPRIATE)
ADLS_ACUITY_SCORE: 12
ADLS_ACUITY_SCORE: 16
ADLS_ACUITY_SCORE: 14
CONCENTRATING,_REMEMBERING_OR_MAKING_DECISIONS_DIFFICULTY: YES
ADLS_ACUITY_SCORE: 16
EATING: 0-->INDEPENDENT
FALL_HISTORY_WITHIN_LAST_SIX_MONTHS: YES
ADLS_ACUITY_SCORE: 16
VISION_MANAGEMENT: GLASSES
TOILETING_ISSUES: NO
EQUIPMENT_CURRENTLY_USED_AT_HOME: CANE, STRAIGHT
ADLS_ACUITY_SCORE: 16
NUMBER_OF_TIMES_PATIENT_HAS_FALLEN_WITHIN_LAST_SIX_MONTHS: 1
DRESS: 1-->ASSISTANCE (EQUIPMENT/PERSON) NEEDED
ADLS_ACUITY_SCORE: 16
ADLS_ACUITY_SCORE: 14
WALKING_OR_CLIMBING_STAIRS: AMBULATION DIFFICULTY, REQUIRES EQUIPMENT;STAIR CLIMBING DIFFICULTY, REQUIRES EQUIPMENT
EATING: 0-->INDEPENDENT
ADLS_ACUITY_SCORE: 16
DIFFICULTY_COMMUNICATING: NO
DRESSING/BATHING_DIFFICULTY: YES
ADLS_ACUITY_SCORE: 16
DRESSING/BATHING: BATHING DIFFICULTY, ASSISTANCE 1 PERSON;DRESSING DIFFICULTY, ASSISTANCE 1 PERSON
DRESS: 0-->ASSISTANCE NEEDED (DEVELOPMENTALLY APPROPRIATE)
ADLS_ACUITY_SCORE: 16
CHANGE_IN_FUNCTIONAL_STATUS_SINCE_ONSET_OF_CURRENT_ILLNESS/INJURY: YES
ADLS_ACUITY_SCORE: 16
WEAR_GLASSES_OR_BLIND: YES

## 2022-02-19 NOTE — PLAN OF CARE
".Assumed cares 4282-8918. Pt rounded on hourly.     .BP 95/57 (BP Location: Right arm)   Pulse 79   Temp 98.5  F (36.9  C) (Oral)   Resp 18   Ht 1.676 m (5' 6\")   Wt 75.5 kg (166 lb 7.2 oz)   SpO2 96%   BMI 26.87 kg/m      Pt Aox4, able to make needs known. WH 0. VSS on RA. Denies pain, SOB, chest pains, N/V. Skin intact and 2 RN skin check complete. BLE edema- lymph wraps on, pt denying discomfort. Slight swelling of the L arm with no pain- MD's aware. Up SBA with cane- calling appropriately. - Hgb A1C scheduled for AM. PIV SL. Endorsed headache this shift- tylenol administered with relief. Pt has home supply of Rizatriptan that has been labeled by pharmacy- provider paged to place orders if pt able to receive.  Sleeping in between cares.     Plan: awaiting placement. Pt does request to be seen by psychiatry. Continue to follow plan of care and notify MD with changes in condition.       Goal Outcome Evaluation:    Plan of Care Reviewed With: patient     Outcome Evaluation: Patient alert and oriented with moments of sleepiness.          "

## 2022-02-19 NOTE — CARE PLAN
"SHIFT 0700 - 1930    Patient A&Ox4, no deficits noted.  Able to make needs known.  VSS on RA.  Pt reported HA this AM.  Rizatriptan administered w/ relief.  Denies CP, SOB, N/V, lightheadedness, or dizziness.  Trace of edema on BLE & L elbow.  LS clear.  Adequate urine output.  Pt reported BM x4 this shift.  Tolerating high faye/protein diet w/ fair appetite.  BG steady in 130's to low 200's.  Insulin administered per sliding scale.  Ace wraps in placed.  Changed by OT this AM.  SBA to bathroom w/ cane.  L PIV SL.    /64 (BP Location: Left arm)   Pulse 81   Temp 97.8  F (36.6  C) (Oral)   Resp 18   Ht 1.702 m (5' 7\")   Wt 71.6 kg (157 lb 12.8 oz)   SpO2 100%   BMI 24.71 kg/m      "

## 2022-02-19 NOTE — PROGRESS NOTES
[facility]    Daily Progress Note - Medicine Service, CONSTANTIN TEAM 4       Date of Admission: 2/14/2022    Hospital Day: 5    Changes today 02/19/2022:  - Discontinue [place] protocol (since no lactulose given on 02/18/2022)  - Start scheduled lactulose TID.  - Awaiting social work's assistance with securing more resources for home cares v. nursing home placement.    Assessment & Plan        Ms. 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    Hypersomnolence, improved  DDx includes acute-on-chronic hepatic encephalopathy (although ammonia negative at 47 umol/L on 02/15/2022 at 1 AM); concussion and/or intracranial bleed after reported fall/head injury on 01/31/2022 (head CT at OSH negative for a bleed at the time, although patient high risk given her ongoing anticoagulation); infection (although no localizing evidence of one; no leukocytosis; and procalcitonin negative); uncontrolled depression/anxiety (especially given anxiety apparent on exam); or polypharmacy. Patient much more awake on 02/18/2022 and 02/19/2022.  - s/p abdominal ultrasound (WITHOUT Doppler) on 02/15/2022 - no ascites  - s/p non-contrast head CT on 02/15/2022: no acute intracranial pathology  - s/p ultrasound with TIPS doppler on 02/16/2022: TIPS without stenosis  - Continue to follow blood culture collected on 02/15/2022 at 9 AM: no growth to date.  - Re-consulted psychiatry on 02/16/2022; appreciate team's assistance.  - Discontinue [place] protocol (since no lactulose given on 02/18/2022)  - Restart scheduled lactulose TID.    Leg swelling, bilateral  Leg pain, bilateral  Arm swelling, left, proximal  In addition to chronic leg swelling from the patient's chronic liver disease and hypoalbuminemia, I suspect  there is a large somatic component to the patient's leg pain since she has had near complete resolution of any appreciable leg swelling on exam 02/19/2022.  - s/p bilateral lower extremity ultrasound on 02/15/2022 - negative for DVT  - s/p IV furosemide 40 mg x1 dose on 02/15/2022 at 3:30 AM   - s/p PO furosemide 40 mg x1 dose on 02/15/2022 at 9:30 AM  - s/p PO oxycodone 5 mg on 02/15/2022 at 1 AM  - s/p IV furosemide 40 mg x1 dose on 02/15/2022  - Home PO furosemide 40 mg daily in the AM  - Continue to monitor.  - Pregabalin 25 mg daily (started 02/15/2022)    CUETO cirrhosis  Anemia, macrocytic, chronic  Thrombocytopenia, chronic  Patient follows with Dr. Cook. MELD-Na score 19 (conferring 3-4% 90-day mortality risk). The patient is currently on the transplant list. Patient and  report she is adherent to lactulose/rifaximin with ~4 bowel movements/day.  - Hepatology consulted and following; appreciate team's assistance.  - s/p abdominal ultrasound on 02/15/2022  - s/p IV furosemide 40 mg x1 dose on 02/15/2022 at 3:30 AM (as above)  - s/p PO furosemide 40 mg x1 dose on 02/15/2022 at 9:30 AM (as above)  - s/p IV furosemide 40 mg x1 dose on 02/15/2022 (as above)  - Home PO furosemide 40 mg daily in the AM (as above).  - Home lactulose 30 mL TID  - Home rifaximin 550 mg BID  - Spironolactone 100 mg daily (increased from home dose 50 mg daily on 02/17/2022)    Hypokalemia, resolved  Potassium 4.5 on 02/15/2022 at 6:30 AM (up from 2.9 on 02/15/2022 at 1:30 AM).  - s/p PO potassium chloride 40 mEq x2 doses on 02/15/2022 at 3:30 AM and 4:45 AM  - s/p continuous infusion of IV potassium chloride 10 mEq/hr on 02/15/2022 3:30 AM - 7 AM (stopped due to burning)  - Home potassium chloride 40 mEq BID  - Discontinue telemetry.  - Trend BMP.    Nausea  - PRN ondansetron  - PRN prochlorperazine    Hypomagnesemia, resolved  Magnesium 1.6 mg/dL on 02/15/2022 at 1 AM. Magnesium up to 1.8 on 02/16/2022.  - s/p IV magnesium  sulfate 2 g x1 dose    Long QT, improved  QTc 545 ms by EKG on 02/15/2022 at 12:30 AM. QTc 495 ms on 02/15/2022 at 9 AM. Note: QTc 516 ms by EKG on most recent EKG from 01/27/2022. QTc down to 486 ms on repeat EKG collected 02/16/2022.     Weakness  - Physical therapy consulted; appreciate therapist's assistance.  - Occupational therapy consulted; appreciate therapist's assistance.    -- CHRONIC MEDICAL PROBLEMS --    Pancreatic insufficiency  Malnutrition  - Nutrition consulted and following; appreciate dietitian's assistance.  - Increased amylase-lipase-protease to 75350-25357 units x2 capsules TID with meals + 24000-76000 x1 capsule PRN with snacks/supplements on 02/16/2022.    DVT, right axillary vein, acute  Patient is managed by [facility]. She is a on a 3-month course of apixaban.  - Home apixaban 5 mg BID    Type 2 diabetes mellitus  HgbA1c 5.9% on 10/20/2021. Patient has a Dexcom CGM.  - Hold home sitaglitpin for now.  - Home insulin glargine 4 units nightly  - Medium-dose insulin aspart correction scale TID with meals and at bedtime  - Fingerstick glucose TID with meals and at bedtime  - Hypoglycemia protocol     Anxiety/Depression  - Resume most recent reported home dose of venlafaxine 75 mg nightly (from 100 mg BID recently ordered on Friday 02/11/2022).  - PO hydroxyzine 25-50 mg Q6H PRN  - Re-consulted psychiatry on 02/16/2022; appreciate team's assistance (as above).  - Escitalopram 10 mg daily (started on 02/18/2022)    Insomnia  - Hold home trazodone 100 mg nightly PRN.    Hypothyroidism  TSH 0.06, Free T4 elevated to 2.3. Pt follows with endocrine and recently had dose changed of levothyroxine.  - Home levothyroxine 175 mcg daily     Hyperlipidemia  - Home atorvastatin 20 mg daily    SOCIAL  Discussed patient's clinical situation and goals of care with the patient's , Roly (phone [phone]), in person at the bedside with patient present but mostly asleep on  "02/17/2022.  does not feel like he has been or would be able to adequately care for the patient at home. He is comfortable with plan for patient to pursue nursing home placement. The patient understands and is not necessarily opposed, although it has also been difficult for patient to participate actively in the discussion with her ongoing hypersomnolence.  - Will continue to keep the patient admitted.  - Writer updated the patient's  at the bedside on 02/17/2022.  - At care coordinator rounds on 02/18/2022 requested social work's assistance with finding more resources for home cares v. nursing home placement.  - Writer called and updated the patient's mother, Kristy (phone [phone]), on 02/17/2022 per the patient's request.  - Writer called and updated the patient's  on the afternoon of 02/18/2022.       Diet: High Kcal/High Protein Diet, ADULT  Snacks/Supplements Adult: Other; Send Ensure Clear (vary flavors) with breakfast and dinner meals.; With Meals  Snacks/Supplements Adult: Gelatein Plus; Between Meals    DVT Prophylaxis: Home apixaban  Rossi Catheter: Not present  Fluids: PO  Central Lines: None  Cardiac Monitoring: None  Code Status: Full Code      Disposition Plan   Expected Discharge: 02/21/2022   Anticipated discharge location:  Awaiting care coordination huddle  Delays:            The patient's care was discussed with the Attending Physician, Dr. Jaylene Yepez .    Porfirio Mosley MD  Medicine Service, [address]     Saturday 02/19/2022    Securely message with the Vocera Web Console (learn more here)  Text page via [facility] Paging/Directory   Please see signed in provider for up to date coverage information    Clinically Significant Risk Factors Present on Admission              # Overweight: Estimated body mass index is 26.87 kg/m  as calculated from the following:    Height as of this encounter: 1.676 m (5' 6\").    " Weight as of this encounter: 75.5 kg (166 lb 7.2 oz).  # Severe Malnutrition: based on nutrition assessment   ______________________________________________________________________    Interval History   - No acute events over night.  - Nursing notes reviewed.  - Patient continues to be even more awake, alert, and interactive this morning.  - She continues to complain of the same symptoms as she did yesterday namely tension-type headache (admittedly relieved by acetaminophen), swelling of the left arm near the antecubital fossa (at the site of a previously placed IV), and bilateral leg heaviness/swelling.  - She is agreeable to the plan to remain admitted until we can explore what options social work may find in the way of extra resources at home (v. placement at a nursing home - less likely).  - She also intends to reach out to the hepatology team's  herself in the coming week.    Data reviewed today: I reviewed all medications, new labs, and imaging results over the last 24 hours.    Physical Exam   Vital Signs: Temp: 98.5  F (36.9  C) Temp src: Oral BP: 95/57 Pulse: 79   Resp: 18 SpO2: 96 % O2 Device: None (Room air)    Weight: 166 lbs 7.16 oz     General: well-appearing chronically ill woman sitting up, awake, alert, interactive, and in no apparent distress; patient awake for the entirety of visit (in contrast to previous visits)  HEENT: infraorbital hyperpigmentation  CV: regular rate/rhythm; no murmurs/rubs/gallops/clicks  Pulm: normal work of breathing on room air; clear to auscultation throughout  Abd: soft, non-tender, non-distended  Ext: wraps over bilateral lower extremities up to knees; no appreciable edema  Neuro: awake, alert, interactive; no apparent slurring of speech; moving all 4 extremities  Skin: jaundiced throughout  Psych: calm, understanding    Data    Labs reviewed. No new microbiology or imaging reviewed.

## 2022-02-19 NOTE — PROGRESS NOTES
Writer gave report to 5B nurse. Patient will be transferring to  room 34-2 with all belongings and meds. Charge updated.

## 2022-02-19 NOTE — SUMMARY OF CARE
.Reason for admission: bilateral lower extremity swelling/pain and hypersomnolence  Admitted from: ED Obs  Report received from: Sabi RN  2 RN skin assessment completed by: Francine GODFREY  Bed Algorithm reevaluated: yes  Was Pulsate ordered?: no  Care plan (primary problem) and Education initiated:    Flu shot ordered (October-April only): pt states already had flu shot  Detailed Belongings:  Heated blanket  Massager  Cell phone and   Cane  Jacket  Robe  Emery backpack with books  Food  Glasses    RN Decompensation Assessment (Repeat at 12 hours)    (Additional guidance for RRT)      Mentation:  Exam is notable for: AOx4. Able to make needs known.     Respiratory mechanics and oxygenation:  Exam is notable for: on RA, O2 sats 100%    Cardiovascular/perfusion:   Exam is notable for: WDL    Overall estimation of the patient s condition/stability: stable

## 2022-02-19 NOTE — DISCHARGE SUMMARY
[facility]  Discharge Summary - Medicine & Pediatrics       Date of Admission: 2/14/2022  Date of Discharge: 2/21/2022  Discharging Provider: Porfirio Mosley; Jaylene Yepez  Discharge Service: Medicine Service, CONSTANTIN TEAM 4    Discharge Diagnoses   1. Hypersomnolence, improved - 2/2 hepatic encephalopathy  2. Depression/anxiety  3. Leg swelling, bilateral  4. Leg pain, bilateral  5. CUETO cirrhosis  6. Anemia, macrocytic, chronic  7. Thrombocytopenia, chronic  8. Hypokalemia, resolved  9. Nausea  10. Hypomagnesemia, resolved  11. Long QT, improved  12. Weakness  13. Arm swelling, left, proximal  14. Severe Malnutrition    Follow-ups Needed After Discharge     Unresulted Labs Ordered in the Past 30 Days of this Admission       Date and Time Order Name Status Description    2/21/2022 12:02 AM Vitamin B6 In process     2/21/2022 12:02 AM Vitamin B3 In process     2/20/2022 12:26 PM Vitamin E In process     2/20/2022 12:26 PM Vitamin B1 whole blood In process         These results will be followed up by the hospitalist pool    Discharge Disposition   Discharged to home  Condition at discharge: Stable    Hospital Course   Ms. 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 The following problems were addressed during her hospitalization:     1. Hypersomnolence, improved - 2/2 hepatic encephalopathy  2. Depression/Anxiety  The patient presented with 1-2 weeks of worsening hypersomnolence and slowed/slurred speech which we suspect was primarily due to acute-on-chronic hepatic encephalopathy. Note: Her ammonia level on admission was 47 umol/L on 02/15/2022. Infectious work-up was collected and negative. Head CT to rule out an intracranial bleed after a reported fall/head injury on 01/31/2022 (head CT  at OSH negative for a bleed at the time, although patient considered high risk given her ongoing anticoagulation) was collected on 02/15/2022 and negative. Abdominal ultrasound without Doppler collected on 02/15/2022 was negative for ascites (effectively ruling out spontaneously bacterial peritonitis), and abdominal ultrasound with TIPS doppler collected on 02/16/2022 demonstrated TIPS without stenosis. The patient's symptoms notably resolved almost entirely after being in the hospital and receiving her scheduled lactulose and rifaximin. We suspect the patient's hypersomnolence at home was due primarily to her and  struggling to adhere to her medication regimen as both expressed difficulty managing the patient's health at home vs intravascular depletion (had had 6+ stools daily prior to admission with poor PO) vs electrolyte abnormalities (hypoK and hypomag). The patient's  in particular was hopeful to send the patient to a skilled nursing facility, although therapies and social work here in the hospital were only able to qualify the patient for a home nurse (1-2x/wk) and home physical and occupational therapy. The medical team explained to the patient and family that a daily nurse/PCA or skilled nursing facility/nursing home placement would otherwise have to be paid out of pocket. We otherwise referred the patient to her outpatient transplant team  for help with any additional resources that may be available to her. Also note that uncontrolled depression/anxiety was felt to be contributing to the patient's presentation. For this reason, psychiatry was consulted; previously prescribed venlafaxine was discontinued and escitalopram (10 mg daily) was started per psychiatry's recommendation.    3. Leg swelling, bilateral  4. Leg pain, bilateral  Like the patient's hepatic encephalopathy which was likely primarily due to non-adherence to home medications, we suspect the patient's leg swelling  was due to non-adherence to her home diuretics (i.e., furosemide and spironolactone) for chronic edema from hypoalbuminemia (albumin 1.7 on 02/21/2022) vs not adhering to salt restriction. Bilateral lower extremity ultrasound collected 02/15/2022 was negative for DVT. She received a couple doses of IV furosemide on 02/15/2022, and we did increase the patient's spironolactone to be 100 mg daily (from 50 mg daily) but otherwise resumed and maintained her furosemide dose at 40 mg daily. Similar to the patient's hepatic encephalopathy, her leg swelling had entirely resolved by the time of discharge with scheduled diuretics, lymphedema wraps, and therapies alone. Because the patient complained of burning sensation of bilateral legs on admission, we did also start the patient on low-dose pregabalin, and her leg pain had resolved on the day of discharge. This burning sensation/pain may have been due to edema alone, but in case there was a neuropathic component, we did discharge the patient home on pregabalin with instruction to follow up with her primary care provider for refills, discontinuation, or uptitration. We also checked several vitamin levels, some of which were pending at the time of discharge (to be followed up by the hospitalist pool or PCP).    5. CUETO cirrhosis  6. Anemia, macrocytic, chronic  7. Thrombocytopenia, chronic  Patient follows with Dr. Cook in the outpatient setting. Inpatient hepatology was consulted and followed the patient during this admission. MELD-Na score here 19 (conferring a 3-4% 90-day mortality risk). The patient was confirmed to be on the transplant list, although relatively lower on the list based on her MELD score. Abdominal imaging was collected (as above). Labs were trended and required no intervention. As above, the patient clinically improved on lactulose/rifaximin (for hepatic encephalopathy) and furosemide/spironolactone for chronic third spacing in the setting of chronic liver  disease (note: the patient's spironolactone dose was increased to 100 mg daily from previously prescribed 50 mg daily).     8. Hypokalemia, resolved  Patient's potassium as low as 2.9 on admission 02/15/2022. Potassium corrected following some oral and IV replacement on 02/15/2022 and remained stably WNLs on her home potassium chloride (40 mEq BID). We suspect the patient's hypokalemia was due to acute nausea and anorexia with perhaps contribution from non-adherence to her home potassium supplement.    9. Nausea, acute-on-chronic  The patient has chronic nausea felt to be related to her chronic liver disease, recently exacerbated due to non-adherence to her medications for hepatic encephalopathy. Her nausea was adequately controlled with PRN medications during her hospitalization. We attempted to set expectations for managing the patient's chronic nausea at home in the setting of ongoing chronic liver disease without a planned lived transplant for the foreseeable future.      10. Hypomagnesemia, resolved  Magnesium as low as 1.6 mg/dL on admission 02/15/2022. Her magnesium corrected to 1.8 mg/dL on 02/16/2022 after receiving a single dose of IV magnesium sulfate 2 g.     11. Long QT, improved  QTc 545 ms by EKG on presentation 02/15/2022 at 12:30 AM (from QTc 516 ms by most recent EKG in our system from 01/27/2022). QTc down to 486 ms on repeat EKG collected 02/16/2022.     12. Weakness  Physical and occupational therapy were consulted and followed the patient throughout her hospitalization.     13. Arm swelling, left, proximal  The patient was noted to have some swelling at the left antecubital fossa that was thought to be related to infiltration from a previously placed PIV.    14. Severe Malnutrition  Nutrition consulted and diagnosed. The patient was prescribed a trial of Ensure Clear and Gelatein Plus.    Consultations This Hospital Stay   GI HEPATOLOGY ADULT IP CONSULT  VASCULAR ACCESS CARE ADULT IP  CONSULT  PSYCHIATRY IP CONSULT  NUTRITION SERVICES ADULT IP CONSULT  PHYSICAL THERAPY ADULT IP CONSULT  OCCUPATIONAL THERAPY ADULT IP CONSULT  PSYCHIATRY IP CONSULT  VASCULAR ACCESS CARE ADULT IP CONSULT  SPIRITUAL HEALTH SERVICES IP CONSULT  CARE MANAGEMENT / SOCIAL WORK IP CONSULT  LYMPHEDEMA THERAPY IP CONSULT    Code Status   Prior     The patient was discussed with Dr. Yepez.    Sis [facility] Service  [facility] [address] [address]  Phone: [phone]    Monday 02/21/2022  ______________________________________________________________________    Physical Exam   Vital Signs: Temp: 97.7  F (36.5  C) Temp src: Oral BP: 94/57 Pulse: 72   Resp: 16 SpO2: 97 % O2 Device: None (Room air)    Weight: 152 lbs 14.4 oz     General: well-appearing chronically ill woman sitting up, awake, alert, interactive, and in no apparent distress; patient awake for the entirety of visit  HEENT: infraorbital hyperpigmentation  CV: regular rate/rhythm; no murmurs/rubs/gallops/clicks  Pulm: normal work of breathing on room air; clear to auscultation throughout  Abd: soft, non-tender, non-distended  Ext: no edema in legs  Neuro: awake, alert, interactive; no apparent slurring of speech; moving all 4 extremities  Skin: jaundiced throughout      Primary Care Physician   Harleen Billy    Discharge Orders     Significant Results and Procedures   Most Recent 3 CBC's:  Recent Labs   Lab Test 02/16/22  0551 02/15/22  0049 01/26/22  2240   WBC 2.6* 2.7* 3.6*   HGB 7.6* 8.2* 10.0*   * 105* 100   PLT 94* 97* 75*     Most Recent 3 BMP's:  Recent Labs   Lab Test 02/21/22  0737 02/21/22  0631 02/21/22  0204 02/19/22  0720 02/19/22  0635 02/18/22  0818 02/18/22  0555 02/17/22  0753 02/17/22  0707   NA  --  138  --   --   --   --  136  --  137   POTASSIUM  --  4.4  --   --  4.6  --  4.9  --  4.4   CHLORIDE  --  108  --   --   --   --  108  --  107   CO2  --  24  --   --   --   --  23  --  26   BUN   --  7  --   --   --   --  8  --  8   CR  --  0.80  --   --   --   --  0.81  --  0.89   ANIONGAP  --  6  --   --   --   --  5  --  4   MAURI  --  8.2*  --   --   --   --  8.0*  --  8.1*   * 154* 119*   < >  --    < > 151*   < > 161*    < > = values in this interval not displayed.     Most Recent 2 LFT's:  Recent Labs   Lab Test 02/21/22  0631 02/16/22  0551   AST 43 51*   ALT 21 23   ALKPHOS 140 144   BILITOTAL 1.8* 1.8*   ,   Results for orders placed or performed during the hospital encounter of 02/14/22   US Lower Extremity Venous Duplex Bilateral    Narrative    EXAMINATION: DOPPLER VENOUS ULTRASOUND OF BILATERAL LOWER EXTREMITIES,  2/15/2022 10:13 AM     COMPARISON: Lower extremity venous ultrasound 12/26/2021    HISTORY: Rule out DVT    TECHNIQUE:  Gray-scale evaluation with compression, spectral flow and  color Doppler assessment of the deep venous system of both legs from  groin to knee, and then at the ankles.    FINDINGS:  In both lower extremities, the common femoral, femoral, popliteal and  posterior tibial veins demonstrate normal compressibility and blood  flow.      Impression    IMPRESSION:  1.  No evidence of deep venous thrombosis in either lower extremity.    I have personally reviewed the examination and initial interpretation  and I agree with the findings.    JORGE LOU MD         SYSTEM ID:  [id number]   US Abdomen Limited    Narrative    EXAMINATION: US ABDOMEN LIMITED  2/15/2022 10:13 AM      CLINICAL HISTORY: assess acities    COMPARISON: Abdominal ultrasound 12/27/2021, CT abdomen and pelvis  1/27/2021    FINDINGS:  No ascites visualized in all 4 quadrants.       Impression    IMPRESSION:   No ascites visualized.    I have personally reviewed the examination and initial interpretation  and I agree with the findings.    JORGE LOU MD         SYSTEM ID:  [id number]   Head CT w/o contrast    Narrative    CT HEAD W/O CONTRAST 2/15/2022 5:45 PM    Provided History: Mental  status change, unknown cause  ICD-10:    Comparison: CT dated 12/26/2021.    Technique: Using multidetector thin collimation helical acquisition  technique, axial, coronal and sagittal CT images from the skull base  to the vertex were obtained without intravenous contrast.     Findings:    No intracranial hemorrhage, mass effect, or midline shift. The  ventricles are proportionate to the cerebral sulci. The gray to white  matter differentiation of the cerebral hemispheres is preserved. The  basal cisterns are patent. Mild diffuse cerebral and cerebellar  parenchymal volume loss.    The visualized paranasal sinuses are clear. The mastoid air cells are  clear.       Impression    Impression: No acute intracranial pathology.    I have personally reviewed the examination and initial interpretation  and I agree with the findings.    DEBORAH LUCERO MD         SYSTEM ID:  [id number]   US TIPSS Doppler    Narrative    ULTRASOUND TIPS DOPPLER 2/16/2022 10:08 AM    CLINICAL HISTORY: To evaluate for patency of vasculature in pt with hx  of TIPS in 2021.     COMPARISONS: 12/27/2021    REFERRING PROVIDER: ANTONIO ARCHER KWESI    TECHNIQUE: TIPS, inferior vena cava and hepatic vasculature evaluated  with color Doppler and Doppler waveform ultrasound.    FINDINGS:  Aorta:        Proximal: 2.0 cm.    Inferior vena cava: 1.7 cm.    Main portal vein diameter: 1.5 cm.    TIPS:       Portal venous end: 55 cm/s       Mid: 155 cm/s (previously 164 cm/s)       Hepatic venous end: 144 cm/s (previously 115 cm/s)    Splenic vein: 43 cm/s, antegrade  Main portal vein: 48 cm/s, antegrade  Right portal vein: 16 cm/s, RETROGRADE (previously 18 cm/s,  retrograde)  Left portal vein: Obscured (previously obscured)    Left hepatic vein: Obscured  Middle hepatic vein: 27 cm/s, antegrade  Right hepatic vein: Not visualized.    Inferior vena cava: 74 cm/s, phasic    Hepatic artery: 71/28 cm/s  Right hepatic artery: 34/14 cm/s  Left hepatic artery:  Obscured      Impression    IMPRESSION:  1. Left lobe of the liver obscured. Left lobe vessels unable to be  evaluated.    2. Right hepatic vein not visualized.    3. Patent TIPS without stenosis.    FINDINGS SUGGESTIVE OF TIPS MALFUNCTION:       A. Peak shunt velocity < 90 cm/s or > 190 cm/s       B. Change in peak shunt velocity:            i. Decrease > 40 cm/s            ii. Increase > 60 cm/s       C. Distal shunt velocity < 90 cm/s or > or = 220 cm/s       D. Main portal vein velocity < or = 30 cm/s       E. Change in portal venous flow direction from retrograde to  antegrade         Source: Hollie RY, Sonya HUSSEIN, Lidia WD, Tai KM, Nitin SA,  Pilgram TK. Doppler sonography findings associated with transjugular  intrahepatic portosystemic shunt malfunction. AJR Am J Roentgenol.  1997 Feb;168(2):467-72. doi: [id number]. PMID: [id number].    MILLICENT VINCENT MD         SYSTEM ID:  [id number]   US Upper Extremity Non Vascular Left    Narrative    Exam: US UPPER EXTREMITY NON VASCULAR LEFT, 2/17/2022 8:45 AM    Indication: LUE swollen, uncomfortable per pt    Comparison: L venous ultrasound 12/26/2021    Technique: Focused grayscale and color ultrasound of the left upper  extremity in the area of concern    Findings:   There is moderate edema noted in the left upper extremity near the  forearm. No definable abscess or abnormal fluid collection.      Impression    Impression:   Moderate left upper extremity edema. No abscess or fluid collection.     I have personally reviewed the examination and initial interpretation  and I agree with the findings.    JORGE LOU MD         SYSTEM ID:  [id number]       Discharge Medications   Discharge Medication List as of 2/21/2022 12:42 PM        START taking these medications    Details   !! amylase-lipase-protease (CREON 24) 13450-26388 units CPEP per EC capsule Take 1 capsule by mouth Take with snacks or supplements (with snacks), Disp-90 capsule, R-0,  E-Prescribe      !! amylase-lipase-protease (CREON 24) 93846-00379 units CPEP per EC capsule Take 2 capsules by mouth 3 times daily (with meals), Disp-90 capsule, R-0, E-Prescribe      escitalopram (LEXAPRO) 10 MG tablet Take 1 tablet (10 mg) by mouth daily, Disp-60 tablet, R-0, E-Prescribe       !! - Potential duplicate medications found. Please discuss with provider.        CONTINUE these medications which have CHANGED    Details   pregabalin (LYRICA) 25 MG capsule Take 1 capsule (25 mg) by mouth daily, Disp-30 capsule, R-0, Local Print      spironolactone (ALDACTONE) 50 MG tablet Take 2 tablets (100 mg) by mouth daily, Disp-30 tablet, R-3, No Print Out      traZODone (DESYREL) 100 MG tablet Take 0.5 tablets (50 mg) by mouth At Bedtime, Historical           CONTINUE these medications which have NOT CHANGED    Details   acetaminophen (TYLENOL) 500 MG tablet Take 1 tablet (500 mg) by mouth every 6 hours as needed for mild pain, No Print Out      acetone urine (KETOSTIX) test strip 1 stripHistorical      apixaban ANTICOAGULANT (ELIQUIS) 5 MG tablet Take 5 mg by mouth 2 times daily, Historical      atorvastatin (LIPITOR) 20 MG tablet Take 20 mg by mouth every evening , Historical      CALCIUM CITRATE PO Take 600 mg by mouth 2 times daily, Historical      Continuous Blood Gluc  (DEXCOM G6 ) LUNA Use as directed for continuous glucose monitoring., Historical      Continuous Blood Gluc Sensor (DEXCOM G6 SENSOR) MISC Use as directed for continuous glucose monitoring.  Change sensor every 10 days., Historical      Continuous Blood Gluc Transmit (DEXCOM G6 TRANSMITTER) MISC Use as directed for continuous glucose monitoring.  Change every 3 months., Historical      cyanocobalamin (VITAMIN B-12) 1000 MCG tablet Take 1,000 mcg by mouth every 7 days Take 1 tablet by mouth every 7 days on Wednesdays, Historical      famotidine (PEPCID) 20 MG tablet Take 20 mg by mouth every morning (before breakfast), Historical       Ferrous Gluconate 324 (37.5 Fe) MG TABS Take 1 tablet by mouth daily, Historical      folic acid (FOLVITE) 1 MG tablet Take 1 mg by mouth every morning , Historical      furosemide (LASIX) 40 MG tablet Take 1 tablet (40 mg) by mouth daily, Disp-30 tablet, R-3, E-Prescribe      hydrOXYzine (ATARAX) 25 MG tablet Take 25 mg by mouth 3 times daily as needed for anxiety, Historical      insulin aspart (NOVOLOG FLEXPEN) 100 UNIT/ML pen Inject 1u/50>200 every 4 hours as needed for high blood glucose. Up to 20 units daily.  Indications: Diabetes Mellitus, Historical      insulin glargine (LANTUS PEN) 100 UNIT/ML pen Inject 8 Units Subcutaneous every morning Takes around 12noon, Disp-15 mL, R-0, No Print OutIf Lantus is not covered by insurance, may substitute Basaglar at same dose and frequency.        insulin pen needle (BD GRISEL U/F) 32G X 4 MM miscellaneous Inject 1 each SubcutaneousHistorical      lactulose (CHRONULAC) 10 GM/15ML solution TAKE 30 MLS BY MOUTH 3 TIMES DAILY, Disp-3784 mL, R-5, E-Prescribe      levothyroxine (SYNTHROID/LEVOTHROID) 175 MCG tablet Take 175 mcg by mouth daily, Historical      melatonin 5 MG tablet Take 5 mg by mouth At Bedtime, Historical      multivitamin CF formula (AQUADEKS) chewable tablet Take 1 tablet by mouth daily, Disp-90 tablet, R-3, E-Prescribe      omeprazole (PRILOSEC OTC) 20 MG EC tablet Take 20 mg by mouth daily, Historical      ondansetron (ZOFRAN-ODT) 4 MG ODT tab Place 1 tablet (4 mg) under the tongue every 6 hours as needed for nausea, Disp-120 tablet, R-1, E-Prescribe      oxyCODONE IR (ROXICODONE) 10 MG tablet Take 10 mg by mouth every 4 hours as needed for severe pain (for pain at bedtime), Historical      potassium chloride ER (K-TAB) 20 MEQ CR tablet Take 2 tablets (40 mEq) by mouth 2 times daily, Disp-120 tablet, R-0, E-Prescribe      rifaximin (XIFAXAN) 550 MG TABS tablet Take 1 tablet (550 mg) by mouth 2 times daily, Disp-60 tablet, R-11, E-PrescribeCan give a  3 month supply if easier and/or cheaper for patient.      Rimegepant Sulfate 75 MG TBDP Take 75 mg by mouth , Historical      sitagliptin (JANUVIA) 100 MG tablet Take 100 mg by mouth every morning , Historical      SUMAtriptan (IMITREX) 50 MG tablet Take 100 mg by mouth at onset of headache for migraine , Historical      topiramate (TOPAMAX) 50 MG tablet Take 50 mg by mouth daily , Historical      valACYclovir (VALTREX) 1000 mg tablet Take 1,000 mg by mouth daily as needed (at onset of cold sore) , Historical      venlafaxine (EFFEXOR) 75 MG tablet Take 1 tablet (75 mg) by mouth At Bedtime, Disp-30 tablet, R-0, E-PrescribeRenewal requests go to Brooke Billy with [facility].      vitamin A 3 MG (81572 UNITS) capsule Take 10,000 Units by mouth every morning , Historical      vitamin C (ASCORBIC ACID) 1000 MG TABS 1,000 mg daily , Historical      vitamin D2 (ERGOCALCIFEROL) 62664 units (1250 mcg) capsule Take 1 capsule (50,000 Units) by mouth once a week, Disp-8 capsule, R-0, E-Prescribe      vitamin E (TOCOPHEROL) 400 units (180 mg) capsule Take 400 Units by mouth every morning , Historical           STOP taking these medications       LORazepam (ATIVAN) 0.5 MG tablet Comments:   Reason for Stopping:             Allergies   Allergies   Allergen Reactions    Methylprednisolone Hives    Droperidol Anxiety    Nsaids Other (See Comments)     GI bleed.    Prednisone Anxiety, Hives and Rash

## 2022-02-19 NOTE — PLAN OF CARE
"SHIFT 6546-2080     Patient A&Ox4, no deficits noted.  Able to make needs known.  VSS on RA.  Pt denies pain. Denies CP, SOB, N/V, lightheadedness, or dizziness.  Trace of edema on BLE & L elbow.  LS clear.  Adequate urine output.   Reported BM x4 in AM shift.  Tolerating high faye/protein diet w/ fair appetite.  BG @ .  Insulin administered per sliding scale.  Ace wraps in placed.  SBA to bathroom w/ cane.  L PIV SL.  /70 (BP Location: Left arm)   Pulse 81   Temp 97.7  F (36.5  C) (Oral)   Resp 18   Ht 1.702 m (5' 7\")   Wt 71.6 kg (157 lb 12.8 oz)   SpO2 100%   BMI 24.71 kg/m                 "

## 2022-02-20 ENCOUNTER — APPOINTMENT (OUTPATIENT)
Dept: OCCUPATIONAL THERAPY | Facility: CLINIC | Age: 52
DRG: 441 | End: 2022-02-20
Payer: COMMERCIAL

## 2022-02-20 LAB
BACTERIA BLD CULT: NO GROWTH
BACTERIA BLD CULT: NO GROWTH
BACTERIA SPT CULT: ABNORMAL
GLUCOSE BLDC GLUCOMTR-MCNC: 182 MG/DL (ref 70–99)
GLUCOSE BLDC GLUCOMTR-MCNC: 225 MG/DL (ref 70–99)
GLUCOSE BLDC GLUCOMTR-MCNC: 256 MG/DL (ref 70–99)
GLUCOSE BLDC GLUCOMTR-MCNC: 271 MG/DL (ref 70–99)
GLUCOSE BLDC GLUCOMTR-MCNC: 324 MG/DL (ref 70–99)
GRAM STAIN RESULT: ABNORMAL
GRAM STAIN RESULT: ABNORMAL
HBA1C MFR BLD: 5.1 % (ref 0–5.6)
VIT B12 SERPL-MCNC: 1525 PG/ML (ref 193–986)

## 2022-02-20 PROCEDURE — 250N000013 HC RX MED GY IP 250 OP 250 PS 637: Performed by: STUDENT IN AN ORGANIZED HEALTH CARE EDUCATION/TRAINING PROGRAM

## 2022-02-20 PROCEDURE — 250N000013 HC RX MED GY IP 250 OP 250 PS 637

## 2022-02-20 PROCEDURE — 84425 ASSAY OF VITAMIN B-1: CPT | Performed by: STUDENT IN AN ORGANIZED HEALTH CARE EDUCATION/TRAINING PROGRAM

## 2022-02-20 PROCEDURE — 36415 COLL VENOUS BLD VENIPUNCTURE: CPT | Performed by: STUDENT IN AN ORGANIZED HEALTH CARE EDUCATION/TRAINING PROGRAM

## 2022-02-20 PROCEDURE — 97535 SELF CARE MNGMENT TRAINING: CPT | Mod: GO

## 2022-02-20 PROCEDURE — 99233 SBSQ HOSP IP/OBS HIGH 50: CPT | Performed by: STUDENT IN AN ORGANIZED HEALTH CARE EDUCATION/TRAINING PROGRAM

## 2022-02-20 PROCEDURE — 84446 ASSAY OF VITAMIN E: CPT | Performed by: STUDENT IN AN ORGANIZED HEALTH CARE EDUCATION/TRAINING PROGRAM

## 2022-02-20 PROCEDURE — 99207 PR CDG-CUT & PASTE-POTENTIAL IMPACT ON LEVEL: CPT | Performed by: STUDENT IN AN ORGANIZED HEALTH CARE EDUCATION/TRAINING PROGRAM

## 2022-02-20 PROCEDURE — 97530 THERAPEUTIC ACTIVITIES: CPT | Mod: GO

## 2022-02-20 PROCEDURE — 120N000002 HC R&B MED SURG/OB UMMC

## 2022-02-20 RX ADMIN — APIXABAN 5 MG: 5 TABLET, FILM COATED ORAL at 21:10

## 2022-02-20 RX ADMIN — INSULIN GLARGINE 4 UNITS: 100 INJECTION, SOLUTION SUBCUTANEOUS at 12:30

## 2022-02-20 RX ADMIN — LACTULOSE 30 ML: 10 SOLUTION ORAL at 09:01

## 2022-02-20 RX ADMIN — LACTULOSE 30 ML: 10 SOLUTION ORAL at 14:16

## 2022-02-20 RX ADMIN — Medication 2.5 MG: at 01:20

## 2022-02-20 RX ADMIN — ACETAMINOPHEN 325 MG: 325 TABLET, FILM COATED ORAL at 01:20

## 2022-02-20 RX ADMIN — PANCRELIPASE 2 CAPSULE: 24000; 76000; 120000 CAPSULE, DELAYED RELEASE PELLETS ORAL at 18:14

## 2022-02-20 RX ADMIN — POTASSIUM CHLORIDE 40 MEQ: 750 CAPSULE, EXTENDED RELEASE ORAL at 21:09

## 2022-02-20 RX ADMIN — POTASSIUM CHLORIDE 40 MEQ: 750 CAPSULE, EXTENDED RELEASE ORAL at 09:02

## 2022-02-20 RX ADMIN — Medication 1 MG: at 23:31

## 2022-02-20 RX ADMIN — PREGABALIN 25 MG: 25 CAPSULE ORAL at 09:03

## 2022-02-20 RX ADMIN — FUROSEMIDE 40 MG: 40 TABLET ORAL at 09:02

## 2022-02-20 RX ADMIN — THIAMINE HCL TAB 100 MG 100 MG: 100 TAB at 09:02

## 2022-02-20 RX ADMIN — LACTULOSE 30 ML: 10 SOLUTION ORAL at 21:10

## 2022-02-20 RX ADMIN — INSULIN ASPART 1 UNITS: 100 INJECTION, SOLUTION INTRAVENOUS; SUBCUTANEOUS at 09:01

## 2022-02-20 RX ADMIN — INSULIN ASPART 4 UNITS: 100 INJECTION, SOLUTION INTRAVENOUS; SUBCUTANEOUS at 18:13

## 2022-02-20 RX ADMIN — Medication 1 CAPSULE: at 09:02

## 2022-02-20 RX ADMIN — ESCITALOPRAM OXALATE 10 MG: 10 TABLET ORAL at 09:02

## 2022-02-20 RX ADMIN — ATORVASTATIN CALCIUM 20 MG: 20 TABLET, FILM COATED ORAL at 09:02

## 2022-02-20 RX ADMIN — SPIRONOLACTONE 100 MG: 25 TABLET, FILM COATED ORAL at 09:02

## 2022-02-20 RX ADMIN — LEVOTHYROXINE SODIUM 175 MCG: 0.03 TABLET ORAL at 09:02

## 2022-02-20 RX ADMIN — APIXABAN 5 MG: 5 TABLET, FILM COATED ORAL at 09:02

## 2022-02-20 RX ADMIN — FOLIC ACID 1 MG: 1 TABLET ORAL at 09:02

## 2022-02-20 RX ADMIN — RIFAXIMIN 550 MG: 550 TABLET ORAL at 21:10

## 2022-02-20 RX ADMIN — INSULIN ASPART 3 UNITS: 100 INJECTION, SOLUTION INTRAVENOUS; SUBCUTANEOUS at 14:16

## 2022-02-20 RX ADMIN — RIFAXIMIN 550 MG: 550 TABLET ORAL at 09:02

## 2022-02-20 RX ADMIN — PANCRELIPASE 2 CAPSULE: 24000; 76000; 120000 CAPSULE, DELAYED RELEASE PELLETS ORAL at 14:15

## 2022-02-20 RX ADMIN — PANCRELIPASE 2 CAPSULE: 24000; 76000; 120000 CAPSULE, DELAYED RELEASE PELLETS ORAL at 09:01

## 2022-02-20 ASSESSMENT — ACTIVITIES OF DAILY LIVING (ADL)
ADLS_ACUITY_SCORE: 16

## 2022-02-20 NOTE — PLAN OF CARE
Goal Outcome Evaluation:    Plan of Care Reviewed With: patient     Overall Patient Progress: improving    Outcome Evaluation: Patient alert and oriented with moments of sleepiness.  Patient ambulated in antunez with her . Took a shower. Patient taking her lactulose. Patient has lymp wraps off.

## 2022-02-20 NOTE — PROGRESS NOTES
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 Won't know until tomorrow. Any additional services will be private pay or through the [facility] if pt has Medicaid. She will not be able to be in TCU/NH/FCI. If pt was able to get on disability, could seek [facility] services. Family might also think about reaching out to their insurance to see what add'l services might be covered. Rec reaching out to the liver txp SW, since she is listed for txp, for continuing to help with this even as an outpatient.     Assessment & Plan  Ms. 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     Hypersomnolence, improved - 2/2 hepatic encephalopathy  DDx includes acute-on-chronic hepatic encephalopathy, concussion and/or intracranial bleed after reported fall/head injury on 01/31/2022 (head CT at OSH negative); infection (negative workup); uncontrolled depression/anxiety (especially given anxiety apparent on exam); or polypharmacy. Patient much improved after tx for HE with lactulose on [place] protocol  - s/p abdominal ultrasound (WITHOUT Doppler) on 02/15/2022 - no ascites  - s/p non-contrast head CT on 02/15/2022: no acute intracranial pathology  - s/p ultrasound with TIPS doppler on 02/16/2022: TIPS without stenosis  - BCx 02/15/2022 NGTD  - Re-consulted psychiatry on 02/16/2022; appreciate team's assistance.  - PTA lactulose TID, titrate to 3-5 BMs daily     Leg  swelling, bilateral, improving  Leg pain/neuropathy, bilateral, improving  Arm swelling, left, proximal, improving  In addition to chronic leg swelling from the patient's chronic liver disease and hypoalbuminemia, I suspect there is a large somatic component to the patient's leg pain since she has had near complete resolution of any appreciable leg swelling on exam 02/19/2022. Not clear the cause of her neuropathy - from edema, vs medications vs nutritional.  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 She is already on thiamine, MVI, and folate supplementation.  - Pregabalin 25 mg daily (started 02/15/2022)     CUETO cirrhosis  Anemia, macrocytic, chronic  Thrombocytopenia, chronic  Patient follows with Dr. Cook. MELD-Na score 19 (conferring 3-4% 90-day mortality risk). The patient is currently on the transplant list. Patient and  report she is adherent to lactulose/rifaximin with ~4 bowel movements/day.  - Hepatology consulted and following; appreciate team's assistance.  - s/p abdominal ultrasound on 02/15/2022  - s/p IV furosemide 40 mg x1 dose on 02/15/2022 at 3:30 AM (as above)  - s/p PO furosemide 40 mg x1 dose on 02/15/2022 at 9:30 AM (as above)  - s/p IV furosemide 40 mg x1 dose on 02/15/2022 (as above)  - Home PO furosemide 40 mg daily in the AM (as above).  - Home lactulose 30 mL TID  - Home rifaximin 550 mg BID  - Spironolactone 100 mg daily (increased from home dose 50 mg daily on 02/17/2022)     Hypokalemia, resolved  Potassium repleted, likely low due to  stool output and diuretics.  - PTA potassium chloride 40 mEq BID  - Discontinue telemetry.  - BMP in AM     Nausea  - PRN ondansetron  - PRN prochlorperazine     Hypomagnesemia, resolved  Magnesium 1.6 mg/dL, now repleted. Likely stool losses.  - s/p IV magnesium sulfate 2 g x1 dose     Long QT, improved  QTc 545 ms by EKG on 02/15/2022 at 12:30 AM. QTc 495 ms on 02/15/2022 at 9 AM. Note: QTc 516 ms by EKG on most recent EKG from 01/27/2022. QTc down to 486 ms on repeat EKG collected 02/16/2022.     Weakness  - Physical therapy consulted; appreciate therapist's assistance.  - Occupational therapy consulted; appreciate therapist's assistance.     -- CHRONIC MEDICAL PROBLEMS --     Pancreatic insufficiency  Malnutrition  - Nutrition consulted and following; appreciate dietitian's assistance.  - Increased amylase-lipase-protease to 08706-33015 units x2 capsules TID with meals + 24000-76000 x1 capsule PRN with snacks/supplements on 02/16/2022.     DVT, right axillary vein, acute  Patient is managed by [facility] thrombosis clinic. She is a on a 3-month course of apixaban.  - Home apixaban 5 mg BID     Type 2 diabetes mellitus  HgbA1c 5.9% on 10/20/2021. Patient has a Dexcom CGM.  - Hold home sitaglitpin for now.  - Home insulin glargine 4 units nightly  - Medium-dose insulin aspart correction scale TID with meals and at bedtime  - Fingerstick glucose TID with meals and at bedtime  - Hypoglycemia protocol  - Hgb A1C 5.1% on 2/18     Anxiety/Depression  - Resume most recent reported home dose of venlafaxine 75 mg nightly (from 100 mg BID recently ordered on Friday 02/11/2022).  - PO hydroxyzine 25-50 mg Q6H PRN  - Re-consulted psychiatry on 02/16/2022; appreciate team's assistance (as above).  - Escitalopram 10 mg daily (started on 02/18/2022)     Insomnia  - Hold home trazodone 100 mg nightly PRN.     Hypothyroidism  TSH 0.06, Free T4 elevated to 2.3. Pt follows with endocrine and recently had dose changed  of levothyroxine, lowed from 250 mcg to 175 mcg on 2/11/22.  - Home levothyroxine 175 mcg daily     Hyperlipidemia  - Home atorvastatin 20 mg daily     SOCIAL  Discussed patient's clinical situation and goals of care with the patient's , Roly (phone [phone]), in person at the bedside with patient present but mostly asleep on 02/17/2022.  does not feel like he has been or would be able to adequately care for the patient at home. He is comfortable with plan for patient to pursue nursing home placement but pt prefers discharge home. PT/OT rec home w assist vs home with home OT/PT.  - Writer called and updated the patient's mother, Kristy (phone [phone]), on 02/17/2022 per the patient's request.  - Writer called and updated the patient's  on the afternoon of 02/18/2022.   - At care coordinator rounds on 02/18/2022 requested social work's assistance with finding more resources for home cares and options for nursing home if available. At this point, probably more likely to be exploring increased resources at home. Will ask for updates Monday 2/21 - likely can discharge home 2/21 pending discussions with pt and  given she is at her baseline status.           Diet: High Kcal/High Protein Diet, ADULT  Snacks/Supplements Adult: Other; Send Ensure Clear (vary flavors) with breakfast and dinner meals.; With Meals  Snacks/Supplements Adult: Gelatein Plus; Between Meals    DVT Prophylaxis: DOAC  Rossi Catheter: Not present  Central Lines: None  Cardiac Monitoring: None  Code Status: Full Code      Disposition Plan   Expected Discharge: 02/21/2022     Anticipated discharge location:  Awaiting care coordination huddle  Delays:  Home care resources      Sis Yepez MD (Sally)  Internal Medicine/Pediatrics  Hospitalist    The patient's care was discussed with the Bedside Nurse and Patient.  Medicine Service, CONSTANTIN TEAM [address] [place] [address]  Securely  message with the Vocera Web Console (learn more here)  Text page via [facility] Paging/Directory   Please see signed in provider for up to date coverage information  ______________________________________________________________________    Interval History   No acute events overnight. Didn't sleep well. Burning pain in feet. Edema better.     Data reviewed today: I reviewed all medications, new labs and imaging results over the last 24 hours. I personally reviewed no images or EKG's today.    Physical Exam   Vital Signs: Temp: 97  F (36.1  C) Temp src: Oral BP: 90/52 Pulse: 73   Resp: 20 SpO2: 97 % O2 Device: None (Room air)    Weight: 166 lbs 10.68 oz    Physical Exam  General: awake, alert, in no acute distress  HEENT: NCAT, EOMI, sclera anicteric, no nasal discharge, MMM  CV: RRR, no murmurs noted  Resp: CTAB, no increased WOB  Abd: Soft, nontender, nondistended  MSK: lymphedema wraps on, pedal edema, extremities warm and well perfused  Skin: warm, dry  Neuro: no asterixis, alert and oriented x4      Data   Results for orders placed or performed during the hospital encounter of 02/14/22 (from the past 24 hour(s))   Glucose by meter   Result Value Ref Range    GLUCOSE BY METER POCT 210 (H) 70 - 99 mg/dL   Glucose by meter   Result Value Ref Range    GLUCOSE BY METER POCT 305 (H) 70 - 99 mg/dL   Glucose by meter   Result Value Ref Range    GLUCOSE BY METER POCT 323 (H) 70 - 99 mg/dL   Glucose by meter   Result Value Ref Range    GLUCOSE BY METER POCT 225 (H) 70 - 99 mg/dL   Glucose by meter   Result Value Ref Range    GLUCOSE BY METER POCT 182 (H) 70 - 99 mg/dL   Care Management / Social Work IP Consult    Narrative    Kameron Sims RN     2/20/2022 12:08 PM  Care Management Initial Consult    General Information  Assessment completed with: VM-chart review,    Type of CM/SW Visit: Offer D/C Planning  Primary Care Provider verified and updated as needed: Yes   Readmission within the last 30 days: no previous  admission in   last 30 days      Reason for Consult: discharge planning  Advance Care Planning:     No ACP docs on file     Communication Assessment  Patient's communication style: spoken language (English or   Bilingual)    Hearing Difficulty or Deaf: no   Wear Glasses or Blind: yes    Cognitive  Cognitive/Neuro/Behavioral: WDL  Level of Consciousness: alert    Arousal Level: opens eyes spontaneously  Orientation: oriented x   4  Mood/Behavior: calm, cooperative  Best Language: 0 - No   aphasia  Speech: clear, spontaneous    Living Environment:   People in home: spouse     Current living Arrangements: house      Able to return to prior arrangements: yes     Family/Social Support:  Care provided by: self, spouse/significant other  Provides care for: no one  Marital Status:   Description of Support System: Supportive, Involved;       Current Resources:   Patient receiving home care services: No  Community Resources: None  Equipment currently used at home: cane, straight, shower chair  Supplies currently used at home: None    Employment/Financial:  Employment Status: unemployed     Financial Concerns: No concerns identified      Lifestyle & Psychosocial Needs:  Social Determinants of Health     Tobacco Use: Low Risk      Smoking Tobacco Use: Never Smoker     Smokeless Tobacco Use: Never Used   Alcohol Use: Not At Risk     Frequency of Alcohol Consumption: Never     Average Number of Drinks: Not on file     Frequency of Binge Drinking: Never   Financial Resource Strain: Not on file   Food Insecurity: No Food Insecurity     Worried About Running Out of Food in the Last Year: Never true     Ran Out of Food in the Last Year: Never true   Transportation Needs: No Transportation Needs     Lack of Transportation (Medical): No     Lack of Transportation (Non-Medical): No   Physical Activity: Not on file   Stress: Not on file   Social Connections: Not on file   Intimate Partner Violence: Not on file   Depression:  At risk     PHQ-2 Score: 4   Housing Stability: Not on file       Functional Status:  Prior to admission patient needed assistance:    Yes     Mental Health Status:      AMS, wax and wane    Chemical Dependency Status:      No current concerns        Values/Beliefs:  Spiritual, Cultural Beliefs, Scientologist Practices, Values that   affect care: no           Ayana    Additional Information:  Patient is a 51 year old female admitted on 2/14/2022. She has a   past medical history significant for type 2   diabetes, seizures, unspecified altered mental status, liver   cirrhosis 2/2 to CUETO, pancytopenia, pleural effusion, Raynaud's   phenomenon, gastric bypass, hepatic encephalopathy, and GEORGETTE   who presents to the emergency department for evaluation of leg   swelling and fatigue.    Per H&P patient is currently on transplant list.    Therapys recommending home PT/OT, RNCC anticipates home with home   PT/OT when medically ready.    AJAY Hirsch RN, BSN  5B RN Care Coordinator  [phone] phone  [phone] pager    Weekend or Holiday Care Coordinator [phone] pager  Job Code [id number]

## 2022-02-20 NOTE — CONSULTS
Care Management Initial Consult    General Information  Assessment completed with: VM-chart review,    Type of CM/SW Visit: Offer D/C Planning  Primary Care Provider verified and updated as needed: Yes   Readmission within the last 30 days: no previous admission in last 30 days      Reason for Consult: discharge planning  Advance Care Planning:     No ACP docs on file     Communication Assessment  Patient's communication style: spoken language (English or Bilingual)    Hearing Difficulty or Deaf: no   Wear Glasses or Blind: yes    Cognitive  Cognitive/Neuro/Behavioral: WDL  Level of Consciousness: alert  Arousal Level: opens eyes spontaneously  Orientation: oriented x 4  Mood/Behavior: calm, cooperative  Best Language: 0 - No aphasia  Speech: clear, spontaneous    Living Environment:   People in home: spouse     Current living Arrangements: house      Able to return to prior arrangements: yes     Family/Social Support:  Care provided by: self, spouse/significant other  Provides care for: no one  Marital Status:   Description of Support System: Supportive, Involved;       Current Resources:   Patient receiving home care services: No  Community Resources: None  Equipment currently used at home: cane, straight, shower chair  Supplies currently used at home: None    Employment/Financial:  Employment Status: unemployed     Financial Concerns: No concerns identified      Lifestyle & Psychosocial Needs:  Social Determinants of Health     Tobacco Use: Low Risk      Smoking Tobacco Use: Never Smoker     Smokeless Tobacco Use: Never Used   Alcohol Use: Not At Risk     Frequency of Alcohol Consumption: Never     Average Number of Drinks: Not on file     Frequency of Binge Drinking: Never   Financial Resource Strain: Not on file   Food Insecurity: No Food Insecurity     Worried About Running Out of Food in the Last Year: Never true     Ran Out of Food in the Last Year: Never true   Transportation Needs: No  Transportation Needs     Lack of Transportation (Medical): No     Lack of Transportation (Non-Medical): No   Physical Activity: Not on file   Stress: Not on file   Social Connections: Not on file   Intimate Partner Violence: Not on file   Depression: At risk     PHQ-2 Score: 4   Housing Stability: Not on file       Functional Status:  Prior to admission patient needed assistance:    Yes     Mental Health Status:      AMS, wax and wane    Chemical Dependency Status:      No current concerns        Values/Beliefs:  Spiritual, Cultural Beliefs, Pentecostal Practices, Values that affect care: no           Restorationist    Additional Information:  Patient is a 51 year old female admitted on 2/14/2022. She has a past medical history significant for type 2 diabetes, seizures, unspecified altered mental status, liver cirrhosis 2/2 to CUETO, pancytopenia, pleural effusion, Raynaud's phenomenon, gastric bypass, hepatic encephalopathy, and GEORGETTE who presents to the emergency department for evaluation of leg swelling and fatigue.    Per H&P patient is currently on transplant list.    Therapys recommending home PT/OT, RNCC anticipates home with home PT/OT when medically ready.    AJAY Hirsch RN, BSN  5B RN Care Coordinator  [phone] phone  [phone] pager    Weekend or Holiday Care Coordinator [phone] pager  Job Code [id number]

## 2022-02-20 NOTE — PLAN OF CARE
".Assumed cares 9885-8087. Pt rounded on hourly.     /62 (BP Location: Right arm)   Pulse 76   Temp 97.1  F (36.2  C) (Oral)   Resp 24   Ht 1.676 m (5' 6\")   Wt 75.6 kg (166 lb 10.7 oz)   SpO2 99%   BMI 26.90 kg/m       Pt Aox4, able to make needs known. VSS on RA. Endorsed burning 9/10 pain at feet, relieved with oxycodone and tylenol. Denies SOB, chest pains, and N/V. Fair appetite this shift with 75% of dinner eaten and cereal at night. Was teary eyed this shift- pt states that being in the hospital has been hard and \"just when I am getting better, I get bad again\". Therapeutic communication and listening utilized and pt appears to have calmed down. Lymph wraps replaced this shift- pt denying discomfort and denies change in sensation to feet. L arm swollen with no pain- MD's aware. 2-3+ edema in bilateral feet. Resting in between cares.     Plan: continue to follow plan of care and notify MD with changes in condition.     Goal Outcome Evaluation:    Plan of Care Reviewed With: patient     Overall Patient Progress: improving    Outcome Evaluation: Patient alert and oriented with moments of sleepiness.          "

## 2022-02-21 ENCOUNTER — APPOINTMENT (OUTPATIENT)
Dept: OCCUPATIONAL THERAPY | Facility: CLINIC | Age: 52
DRG: 441 | End: 2022-02-21
Payer: COMMERCIAL

## 2022-02-21 ENCOUNTER — TELEPHONE (OUTPATIENT)
Dept: TRANSPLANT | Facility: CLINIC | Age: 52
End: 2022-02-21
Payer: COMMERCIAL

## 2022-02-21 ENCOUNTER — APPOINTMENT (OUTPATIENT)
Dept: PHYSICAL THERAPY | Facility: CLINIC | Age: 52
DRG: 441 | End: 2022-02-21
Payer: COMMERCIAL

## 2022-02-21 VITALS
HEART RATE: 72 BPM | WEIGHT: 152.9 LBS | TEMPERATURE: 97.7 F | OXYGEN SATURATION: 97 % | BODY MASS INDEX: 24.57 KG/M2 | DIASTOLIC BLOOD PRESSURE: 57 MMHG | HEIGHT: 66 IN | RESPIRATION RATE: 16 BRPM | SYSTOLIC BLOOD PRESSURE: 94 MMHG

## 2022-02-21 DIAGNOSIS — K74.60 CIRRHOSIS OF LIVER (H): Primary | ICD-10-CM

## 2022-02-21 LAB
ALBUMIN SERPL-MCNC: 1.7 G/DL (ref 3.4–5)
ALP SERPL-CCNC: 140 U/L (ref 40–150)
ALT SERPL W P-5'-P-CCNC: 21 U/L (ref 0–50)
ANION GAP SERPL CALCULATED.3IONS-SCNC: 6 MMOL/L (ref 3–14)
AST SERPL W P-5'-P-CCNC: 43 U/L (ref 0–45)
BILIRUB SERPL-MCNC: 1.8 MG/DL (ref 0.2–1.3)
BUN SERPL-MCNC: 7 MG/DL (ref 7–30)
CALCIUM SERPL-MCNC: 8.2 MG/DL (ref 8.5–10.1)
CHLORIDE BLD-SCNC: 108 MMOL/L (ref 94–109)
CO2 SERPL-SCNC: 24 MMOL/L (ref 20–32)
CREAT SERPL-MCNC: 0.8 MG/DL (ref 0.52–1.04)
FOLATE SERPL-MCNC: 19.8 NG/ML
GFR SERPL CREATININE-BSD FRML MDRD: 90 ML/MIN/1.73M2
GLUCOSE BLD-MCNC: 154 MG/DL (ref 70–99)
GLUCOSE BLDC GLUCOMTR-MCNC: 119 MG/DL (ref 70–99)
GLUCOSE BLDC GLUCOMTR-MCNC: 161 MG/DL (ref 70–99)
GLUCOSE BLDC GLUCOMTR-MCNC: 236 MG/DL (ref 70–99)
HOLD SPECIMEN: NORMAL
HOLD SPECIMEN: NORMAL
POTASSIUM BLD-SCNC: 4.4 MMOL/L (ref 3.4–5.3)
PROT SERPL-MCNC: 5 G/DL (ref 6.8–8.8)
SODIUM SERPL-SCNC: 138 MMOL/L (ref 133–144)

## 2022-02-21 PROCEDURE — 82746 ASSAY OF FOLIC ACID SERUM: CPT | Performed by: STUDENT IN AN ORGANIZED HEALTH CARE EDUCATION/TRAINING PROGRAM

## 2022-02-21 PROCEDURE — 97110 THERAPEUTIC EXERCISES: CPT | Mod: GP

## 2022-02-21 PROCEDURE — 250N000013 HC RX MED GY IP 250 OP 250 PS 637: Performed by: STUDENT IN AN ORGANIZED HEALTH CARE EDUCATION/TRAINING PROGRAM

## 2022-02-21 PROCEDURE — 36415 COLL VENOUS BLD VENIPUNCTURE: CPT | Performed by: STUDENT IN AN ORGANIZED HEALTH CARE EDUCATION/TRAINING PROGRAM

## 2022-02-21 PROCEDURE — 97535 SELF CARE MNGMENT TRAINING: CPT | Mod: GO

## 2022-02-21 PROCEDURE — 84207 ASSAY OF VITAMIN B-6: CPT | Performed by: STUDENT IN AN ORGANIZED HEALTH CARE EDUCATION/TRAINING PROGRAM

## 2022-02-21 PROCEDURE — 84591 ASSAY OF NOS VITAMIN: CPT | Performed by: STUDENT IN AN ORGANIZED HEALTH CARE EDUCATION/TRAINING PROGRAM

## 2022-02-21 PROCEDURE — 97530 THERAPEUTIC ACTIVITIES: CPT | Mod: GP

## 2022-02-21 PROCEDURE — 250N000013 HC RX MED GY IP 250 OP 250 PS 637

## 2022-02-21 PROCEDURE — 99239 HOSP IP/OBS DSCHRG MGMT >30: CPT | Mod: GC | Performed by: STUDENT IN AN ORGANIZED HEALTH CARE EDUCATION/TRAINING PROGRAM

## 2022-02-21 PROCEDURE — 250N000011 HC RX IP 250 OP 636

## 2022-02-21 PROCEDURE — 80053 COMPREHEN METABOLIC PANEL: CPT | Performed by: STUDENT IN AN ORGANIZED HEALTH CARE EDUCATION/TRAINING PROGRAM

## 2022-02-21 PROCEDURE — 82040 ASSAY OF SERUM ALBUMIN: CPT | Performed by: STUDENT IN AN ORGANIZED HEALTH CARE EDUCATION/TRAINING PROGRAM

## 2022-02-21 RX ORDER — PREGABALIN 25 MG/1
25 CAPSULE ORAL DAILY
Qty: 30 CAPSULE | Refills: 0 | Status: SHIPPED | OUTPATIENT
Start: 2022-02-22 | End: 2022-02-21

## 2022-02-21 RX ORDER — SPIRONOLACTONE 50 MG/1
100 TABLET, FILM COATED ORAL DAILY
Qty: 30 TABLET | Refills: 3
Start: 2022-02-21 | End: 2022-08-30

## 2022-02-21 RX ORDER — PREGABALIN 25 MG/1
25 CAPSULE ORAL DAILY
Qty: 30 CAPSULE | Refills: 0 | Status: ON HOLD | OUTPATIENT
Start: 2022-02-22 | End: 2022-11-10

## 2022-02-21 RX ORDER — TRAZODONE HYDROCHLORIDE 100 MG/1
50 TABLET ORAL AT BEDTIME
COMMUNITY
Start: 2022-02-21 | End: 2022-11-28 | Stop reason: DRUGHIGH

## 2022-02-21 RX ORDER — ESCITALOPRAM OXALATE 10 MG/1
10 TABLET ORAL DAILY
Qty: 60 TABLET | Refills: 0 | Status: SHIPPED | OUTPATIENT
Start: 2022-02-21 | End: 2022-11-28 | Stop reason: DRUGHIGH

## 2022-02-21 RX ADMIN — ESCITALOPRAM OXALATE 10 MG: 10 TABLET ORAL at 08:33

## 2022-02-21 RX ADMIN — LACTULOSE 30 ML: 10 SOLUTION ORAL at 13:06

## 2022-02-21 RX ADMIN — THIAMINE HCL TAB 100 MG 100 MG: 100 TAB at 08:32

## 2022-02-21 RX ADMIN — INSULIN ASPART 2 UNITS: 100 INJECTION, SOLUTION INTRAVENOUS; SUBCUTANEOUS at 12:36

## 2022-02-21 RX ADMIN — APIXABAN 5 MG: 5 TABLET, FILM COATED ORAL at 08:33

## 2022-02-21 RX ADMIN — RIFAXIMIN 550 MG: 550 TABLET ORAL at 08:33

## 2022-02-21 RX ADMIN — FOLIC ACID 1 MG: 1 TABLET ORAL at 08:33

## 2022-02-21 RX ADMIN — PREGABALIN 25 MG: 25 CAPSULE ORAL at 08:42

## 2022-02-21 RX ADMIN — LEVOTHYROXINE SODIUM 175 MCG: 0.03 TABLET ORAL at 08:32

## 2022-02-21 RX ADMIN — Medication 1 CAPSULE: at 08:34

## 2022-02-21 RX ADMIN — SPIRONOLACTONE 100 MG: 25 TABLET, FILM COATED ORAL at 08:33

## 2022-02-21 RX ADMIN — PANCRELIPASE 2 CAPSULE: 24000; 76000; 120000 CAPSULE, DELAYED RELEASE PELLETS ORAL at 12:36

## 2022-02-21 RX ADMIN — ONDANSETRON 4 MG: 2 INJECTION INTRAMUSCULAR; INTRAVENOUS at 08:25

## 2022-02-21 RX ADMIN — POTASSIUM CHLORIDE 40 MEQ: 750 CAPSULE, EXTENDED RELEASE ORAL at 08:31

## 2022-02-21 RX ADMIN — PANCRELIPASE 2 CAPSULE: 24000; 76000; 120000 CAPSULE, DELAYED RELEASE PELLETS ORAL at 07:49

## 2022-02-21 RX ADMIN — LACTULOSE 30 ML: 10 SOLUTION ORAL at 08:32

## 2022-02-21 RX ADMIN — ATORVASTATIN CALCIUM 20 MG: 20 TABLET, FILM COATED ORAL at 08:32

## 2022-02-21 RX ADMIN — INSULIN ASPART 1 UNITS: 100 INJECTION, SOLUTION INTRAVENOUS; SUBCUTANEOUS at 07:51

## 2022-02-21 RX ADMIN — FUROSEMIDE 40 MG: 40 TABLET ORAL at 08:32

## 2022-02-21 ASSESSMENT — ACTIVITIES OF DAILY LIVING (ADL)
ADLS_ACUITY_SCORE: 16

## 2022-02-21 NOTE — PLAN OF CARE
".Assumed cares 6312-9934. Pt rounded on hourly.     .BP 94/57 (BP Location: Right arm)   Pulse 72   Temp 97.7  F (36.5  C) (Oral)   Resp 16   Ht 1.676 m (5' 6\")   Wt 69.4 kg (152 lb 14.4 oz)   SpO2 97%   BMI 24.68 kg/m      Pt Aox4, able to make needs known. VSS on RA. Denies pain, SOB, chest pains, and N/V. Lymph wraps removed per pt request. Up independently with cane, calls appropriately if needing assistance. Pt reports better appetite today. 1 BM this shift. Good urine output. PIV SL. Sleeping in between cares.     Plan: care conference with pt and , pt states  will come at 1000. Continue to follow plan of care and notify MD with changes in condition.     Goal Outcome Evaluation:    Plan of Care Reviewed With: patient     Overall Patient Progress: improving    Outcome Evaluation: Anticipate patient to discharge home with home PT/OT          "

## 2022-02-21 NOTE — TELEPHONE ENCOUNTER
Transplant Social Work Services Phone Call    Data: I received an email from patient that she has been in the hospital/ER since 2/14/2022. She inquired about PCA, and reported that she had a missed appointment to complete the MNChoice assessment. She reported that she was unaware of that appointment. She inquired about moving to [place] and also completing the MNChoice assessment. I provided Roly and Susan the main number to call to complete an assessment and reported that [place] is not a barrier to receive care at [facility], and typically has low copay costs for medications.   Intervention: psychosocial support  Assessment: Susan and Roly continue to present as overwhelmed  Education provided by SW: M[place]hoice and [place]  Plan: This writer will continue to be available for ongoing psychosocial support. Patient is listed for transplant.     RENARD Alvarado, [facility]  Liver Transplant   [facility]  Phone: [phone]  Pager: [phone]

## 2022-02-21 NOTE — PLAN OF CARE
Assumed cares: 2012-0443  Vitals: VSS on RA  Pain: Pt denies any pain  Neuro: A&Ox4  Cardiac: WDL  Respiratory: WDL  GI/: WDL; 1 BM during shift  Skin: WDL  IV/Drains: R PIV- SL  Activity: Independent/SBA  Behavior: Pt is calm and cooperative    Plan of Care: Follow patient plan of care. Potential discharge today.     Goal Outcome Evaluation:    Plan of Care Reviewed With: patient     Overall Patient Progress: improving    Outcome Evaluation: Anticipate patient to discharge home with home PT/OT

## 2022-02-21 NOTE — PLAN OF CARE
Goal Outcome Evaluation:    Plan of Care Reviewed With: patient     Overall Patient Progress: improving    Outcome Evaluation: Anticipate patient to discharge home with home PT/OT      Patient up with cane. Perez wraps on.  will be here at 10 am Monday to talk with medicine docs regarding going home. Blood sugars elevated

## 2022-02-21 NOTE — PROGRESS NOTES
Care Management Discharge Note    Discharge Date: 02/21/2022       Discharge Disposition: Home     Discharge Services: Home Care with [facility] RN/PT/OT    Discharge DME: None    Discharge Transportation: family or friend will provide    Education Provided on the Discharge Plan:  Yes  Persons Notified of Discharge Plans: Yes  Patient/Family in Agreement with the Plan:  Yes    Handoff Referral Completed: Yes    Additional Information:  Patient discharging home with [facility] RN/PT/OT      AJAY Hirsch RN, BSN  5B RN Care Coordinator  [phone] phone  [phone] pager    Weekend or Holiday Care Coordinator [phone] pager  Job Code [phone]

## 2022-02-21 NOTE — PLAN OF CARE
Discharged to: Home  Transportation:   Time: 1200  Prescriptions: Picked up at discharge pharmacy   Belongings: Shirt, pants, shoes, purse, jacket, hat, cell phone and cell phone   PIV/Access: Removed  Care Plan and Education discontinued: Yes  Paperwork: SAMEER

## 2022-02-22 ENCOUNTER — PATIENT OUTREACH (OUTPATIENT)
Dept: GASTROENTEROLOGY | Facility: CLINIC | Age: 52
End: 2022-02-22
Payer: COMMERCIAL

## 2022-02-22 DIAGNOSIS — G44.89 OTHER HEADACHE SYNDROME: Primary | ICD-10-CM

## 2022-02-22 DIAGNOSIS — E56.0 VITAMIN E DEFICIENCY: ICD-10-CM

## 2022-02-22 DIAGNOSIS — K75.81 LIVER CIRRHOSIS SECONDARY TO NASH (H): ICD-10-CM

## 2022-02-22 DIAGNOSIS — K74.60 LIVER CIRRHOSIS SECONDARY TO NASH (H): ICD-10-CM

## 2022-02-22 NOTE — PROGRESS NOTES
Hepatology post hospital discharge RNCC assessment    Post hospital discharge follow up:      1. Admission diagnosis:  Leg swelling and fatigue    2. Discharge diagnosis:    Hypersomnolence, improved - 2/2 hepatic encephalopathy  Depression/anxiety  Leg swelling, bilateral  Leg pain, bilateral  CUETO cirrhosis  Anemia, macrocytic, chronic  Thrombocytopenia, chronic  Hypokalemia, resolved  Nausea  Hypomagnesemia, resolved  Long QT, improved  Weakness  Arm swelling, left, proximal   3. Admitted on: 2/14/2022  4. Discharged on:  2/21/2022    Medication Review    1. Medication review completed.    2. Discharge medication changes reviewed.   3. Patient did have new medications prescribed in hospital.  -Creon 24 12663-70219 units CPEP per EC capsule, Take 1 capsule by mouth with snacks  - Creon 24 55806-40623 units CPEP per EC capsule, Take 2 capsules by mouth 3 times daily (with meals)  - Escitalopram 10 mg tablet, Take 1 tablet (10 mg) by mouth daily     Follow Up Post Discharge    1. Reviewed discharge instructions with patient. Follow up appointments reviewed and transportation to appointments confirmed.   2. Patient able to verbalized understanding of discharge instructions including medications and follow up.      3. Care coordinator role and contact information reviewed, and pt encouraged to call with questions or concerns.     Symptom Review    1.  Ascites:  Denied ascites. US abdomen on 2/15 showed no ascites.   2.  Edema:  Pt reported decreased lower extremity edema. Pt discharged on furosemide 40 mg daily and spironolactone 100 mg daily.   3.  Encephalopathy: Reiterated the importance of pt titrating lactulose to achieve 3-5 BMs/day. Pt stated that she put an alarm on her phone to remind her to take lactulose.  4.  Anxiety/Depression:  Pt stated that she has been compliant with lexapro. Pt will continue to follow with Sancho Gaines for mental health support.   5. Deconditioning/Care: Pt discharged to home with  home care (RN, PT, OT). Juanita Bhagat, transplant SW, will reach out to pt regarding PCA services.      Plan    1. Pt has hepatology follow up scheduled on 4/22/22 with Dr. Cook.  2. Pt has appointment with PCP on 3/7/22.  3. Plan to check in with pt in 1 week.       Patient was given an opportunity to ask questions and have those questions answered to her satisfaction.  Patient verbalized understanding of instructions provided and agreed to plan of care.

## 2022-02-22 NOTE — PLAN OF CARE
Occupational Therapy Discharge Summary    Reason for therapy discharge:    Discharged to home.    Progress towards therapy goal(s). See goals on Care Plan in [facility] electronic health record for goal details.  Goals partially met.  Barriers to achieving goals:   discharge from facility.    Therapy recommendation(s):    No further therapy is recommended.

## 2022-02-22 NOTE — PLAN OF CARE
Physical Therapy Discharge Summary    Reason for therapy discharge:    Discharged to home with home therapy.    Progress towards therapy goal(s). See goals on Care Plan in [facility] electronic health record for goal details.  Goals partially met.  Barriers to achieving goals:   discharge from facility.    Therapy recommendation(s):    Continued therapy is recommended.  Rationale/Recommendations:  to improve functional strength, dynamic balance and safety with IND mobility.

## 2022-02-23 LAB
A-TOCOPHEROL VIT E SERPL-MCNC: 1.8 MG/L
BETA+GAMMA TOCOPHEROL SERPL-MCNC: 0.3 MG/L

## 2022-02-24 ENCOUNTER — TELEPHONE (OUTPATIENT)
Dept: TRANSPLANT | Facility: CLINIC | Age: 52
End: 2022-02-24
Payer: COMMERCIAL

## 2022-02-24 LAB — PYRIDOXAL PHOS SERPL-SCNC: 55.1 NMOL/L

## 2022-02-24 NOTE — TELEPHONE ENCOUNTER
Patient Called:  Had to cancel appt today at 0900 with Davi Clayton, Pharmacist  Will need to be re-scheduled.   Susan stated she over booked herself had another appt today at that time      Call back needed? Yes    Return Call Needed  Same as documented in contacts section  When to return call?: Greater than one day: Route standard priority

## 2022-02-25 LAB — VIT B1 PYROPHOSHATE BLD-SCNC: 163 NMOL/L

## 2022-02-25 RX ORDER — MV-MIN 51/FOLIC ACID/VIT K/UBI 100-350MCG
1 TABLET,CHEWABLE ORAL DAILY
Qty: 30 TABLET | Refills: 3 | Status: SHIPPED | OUTPATIENT
Start: 2022-02-25 | End: 2022-03-01

## 2022-02-25 NOTE — PROGRESS NOTES
Addendum to Discharge Summary dated 02/21/2022    14. Severe Malnutrition  Nutrition consulted and diagnosed. The patient was prescribed a trial of Ensure Clear and Gelatein Plus.    Porfirio Mosley MD

## 2022-02-27 ENCOUNTER — TELEPHONE (OUTPATIENT)
Dept: TRANSPLANT | Facility: CLINIC | Age: 52
End: 2022-02-27
Payer: COMMERCIAL

## 2022-02-27 NOTE — TELEPHONE ENCOUNTER
Susan paged that she is took all her lactulose yesterday. She did have 3 BMs yesterday. This morning she had a BM in middle of the night and 2 BMs this morning already. She is worried about over doing lactulose. She was told she was taking too much lactulose at her recent admission from her understanding. Susan will continue to monitor her BMs and call back with any changes.

## 2022-02-28 ENCOUNTER — PATIENT OUTREACH (OUTPATIENT)
Dept: GASTROENTEROLOGY | Facility: CLINIC | Age: 52
End: 2022-02-28

## 2022-02-28 ENCOUNTER — VIRTUAL VISIT (OUTPATIENT)
Dept: PHARMACY | Facility: CLINIC | Age: 52
End: 2022-02-28
Payer: COMMERCIAL

## 2022-02-28 DIAGNOSIS — D64.9 ANEMIA, UNSPECIFIED TYPE: ICD-10-CM

## 2022-02-28 DIAGNOSIS — E78.5 DYSLIPIDEMIA: ICD-10-CM

## 2022-02-28 DIAGNOSIS — F32.A DEPRESSION, UNSPECIFIED DEPRESSION TYPE: ICD-10-CM

## 2022-02-28 DIAGNOSIS — K74.60 LIVER CIRRHOSIS SECONDARY TO NASH (H): ICD-10-CM

## 2022-02-28 DIAGNOSIS — K74.60 CIRRHOSIS OF LIVER WITHOUT ASCITES, UNSPECIFIED HEPATIC CIRRHOSIS TYPE (H): Primary | ICD-10-CM

## 2022-02-28 DIAGNOSIS — G62.9 NEUROPATHY: ICD-10-CM

## 2022-02-28 DIAGNOSIS — Z98.84 H/O GASTRIC BYPASS: ICD-10-CM

## 2022-02-28 DIAGNOSIS — Z78.9 TAKES DIETARY SUPPLEMENTS: ICD-10-CM

## 2022-02-28 DIAGNOSIS — K75.81 LIVER CIRRHOSIS SECONDARY TO NASH (H): Primary | ICD-10-CM

## 2022-02-28 DIAGNOSIS — K75.81 LIVER CIRRHOSIS SECONDARY TO NASH (H): ICD-10-CM

## 2022-02-28 DIAGNOSIS — K21.9 GASTROESOPHAGEAL REFLUX DISEASE, UNSPECIFIED WHETHER ESOPHAGITIS PRESENT: ICD-10-CM

## 2022-02-28 DIAGNOSIS — K76.82 HEPATIC ENCEPHALOPATHY (H): ICD-10-CM

## 2022-02-28 DIAGNOSIS — K86.89 PANCREATIC INSUFFICIENCY: ICD-10-CM

## 2022-02-28 DIAGNOSIS — I82.629 DEEP VEIN THROMBOSIS (DVT) OF UPPER EXTREMITY, UNSPECIFIED CHRONICITY, UNSPECIFIED LATERALITY, UNSPECIFIED VEIN (H): ICD-10-CM

## 2022-02-28 DIAGNOSIS — E55.9 VITAMIN D DEFICIENCY: ICD-10-CM

## 2022-02-28 DIAGNOSIS — R11.0 NAUSEA: ICD-10-CM

## 2022-02-28 DIAGNOSIS — E11.9 TYPE 2 DIABETES MELLITUS WITHOUT COMPLICATION, WITHOUT LONG-TERM CURRENT USE OF INSULIN (H): ICD-10-CM

## 2022-02-28 DIAGNOSIS — K74.60 LIVER CIRRHOSIS SECONDARY TO NASH (H): Primary | ICD-10-CM

## 2022-02-28 PROCEDURE — 99607 MTMS BY PHARM ADDL 15 MIN: CPT | Performed by: PHARMACIST

## 2022-02-28 PROCEDURE — 99605 MTMS BY PHARM NP 15 MIN: CPT | Performed by: PHARMACIST

## 2022-02-28 RX ORDER — RIZATRIPTAN BENZOATE 10 MG/1
10 TABLET ORAL
Status: ON HOLD | COMMUNITY
End: 2022-11-10

## 2022-02-28 NOTE — PATIENT INSTRUCTIONS
Recommendations from today's MTM visit:                                                       1. Can move Vitamin B12, folic acid, ferrous gluconate, Vitamin C (with iron), Multivitamin, famotidine, Januvia, Topiramate, Vitamin A (with meal), Vitamin D2, Vitamin E, Atorvastatin to the evening if you like.   2. Increase Potassium to 2 tablets (40mEq) twice daily.   3. Follow-up ASAP with endocrinology to discuss high Blood sugars.    Follow-up: as needed    It was great to speak with you today.  I value your experience and would be very thankful for your time with providing feedback on our clinic survey. You may receive a survey via email or text message in the next few days.     To schedule another MTM appointment, please call the clinic directly or you may call the MTM scheduling line at [phone] or toll-free at [phone].     My Clinical Pharmacist's contact information:                                                      Please feel free to contact me with any questions or concerns you have.      Davi Clayton, Mahnaz  MTM Pharmacist    Phone: [phone]

## 2022-02-28 NOTE — PROGRESS NOTES
Medication Therapy Management (MTM) Encounter    ASSESSMENT:                            Medication Adherence/Access: Patient is taking most her medications in the morning and would like to move some to the afternoon. We discussed which ones are not dependant on dosing time.     Hepatic encephalopathy: Stable.     CUETO cirrhosis/ Ascites: Per EMR patient should be taking Potassium 40meq twice daily. Instructed patient to increase this dose.     Hyperlipidemia: Stable.     Depression:  Stable.     Pancreatic insufficiency: Stable.     Neuropathy: Stable.     Type 2 Diabetes:  Patient's blood sugars elevated per pt report. She says she can get in contact with endocrine very quickly. Recommended she follow-up ASAP.     GERD/GIB: Stable.     Anemia: No changes    Supplement: No changes.     DVT: Stable.     Nausea/ Migraine: Stable.     PLAN:                            1. Can move Vitamin B12, folic acid, ferrous gluconate, Vitamin C (with iron), Multivitamin, famotidine, Januvia, Topiramate, Vitamin A (with meal), Vitamin D2, Vitamin E, Atorvastatin to the evening if you like.   2. Increase Potassium to 2 tablets (40mEq) twice daily.   3. Follow-up ASAP with endocrinology to discuss high Blood sugars.    Follow-up: as needed    SUBJECTIVE/OBJECTIVE:                          Susan Puckett is a 51 year old female called for a transitions of care visit. She was discharged from [facility] on 2/21 for hepatic encephalopathy, hypersomnolence.      Reason for visit: ISATU. Wondering about Timing of medications, would like to move some meds to the afternoon    Allergies/ADRs: Reviewed in chart  Past Medical History: Reviewed in chart  Tobacco: She reports that she has never smoked. She has never used smokeless tobacco.  Alcohol: not currently using    Medication Adherence/Access: Patient takes medications 3 time(s) per day. 8:30 am, 4 pm. Her  sets up her medications    Hepatic encephalopathy: Pt is taking Lactulose 30mL TID,  Xifaxan 550mg twice daily. Having 3-5 BMs daily. Patient states she feels great since discharge.     CUETO cirrhosis/ Ascites: Pt is taking Spironolactone 100mg daily, Furosemide 40mg daily, Potassium 20mEq twice daily. Weights: 2-5 lbs fluctuation. 149# this morning, yesterday 152#. Usually between 148-154lbs. Follows a low sodium diet. No recent paracentesis. Last was 2 years ago.   Results for SHARMILA CONNER (MRN [id number]) as of 2/28/2022 14:19   Ref. Range 2/21/2022 06:31   Potassium Latest Ref Range: 3.4 - 5.3 mmol/L 4.4     Hyperlipidemia: Current therapy includes Atorvastatin 10mg daily.  Patient reports no significant myalgias or other side effects.    Depression:  Current medications include: Escitalopram 10mg once daily. Pt reports that depression symptoms are unchanged. Working, but wondering if it is numbing her. She is follow-up with behavior health this month to discuss.   PHQ-9 SCORE 11/5/2020 2/10/2021 1/21/2022   PHQ-9 Total Score MyChart 10 (Moderate depression) 8 (Mild depression) 16 (Moderately severe depression)   PHQ-9 Total Score 10 8 16     Pancreatic insufficiency: Pt is taking Creon 14254-24783 units 2 caps before meals and 1 before caps. Gets stomach pain if she takes it too late.     Neuropathy: Pt is taking Lyrica 25mg daily. This is effective per patient. No drowsiness/ confusion from this med.     Type 2 Diabetes:  Currently taking Januvia 100mg daily, Novolog sliding scale, Lantus 4 units daily. Patient is not experiencing side effects. Follow-up with endocrinology end of march.   Blood sugar monitoring: Continuous Glucose Monitor. Ranges (patient reported): 250-high  Symptoms of low blood sugar? shaky, warm  Symptoms of high blood sugar? Feels generally bad  Lab Results   Component Value Date    A1C 5.1 02/18/2022    A1C 5.6 01/10/2022    A1C 5.4 12/26/2021     Hemoglobin   Date Value Ref Range Status   02/16/2022 7.6 (L) 11.7 - 15.7 g/dL Final   07/05/2021 11.5 (L) 11.7 - 15.7 g/dL  Final     GERD/ GIB: Current medications include: Pepcid (famotidine) 20mg once daily, omeprazole 20mg daily. Pt reports no current symptoms.   The patient does have a history of GI bleed. Patient feels that current regimen is effective.    Anemia: Pt is taking Ferrous Gluconate 325mg once daily.   Hemoglobin   Date Value Ref Range Status   02/16/2022 7.6 (L) 11.7 - 15.7 g/dL Final   07/05/2021 11.5 (L) 11.7 - 15.7 g/dL Final     Supplement: Pt is taking Vitamin B12 1000mcg weekly, Calcium twice daily, Vitamin B12 daily, Vitamin D2 weekly, Vitamin C daily, Folic acid daily, Melatonin 5mg daily    DVT: Pt is taking Eliquis 5mg twice daily. Denies bleed sx. She will be anticoagulated until March. DVTs occurred 11/2021.     Nausea/ Migraine: Pt is taking Ondansetron 4-8mg as needed, Rizatriptan 10mg prn. Has not needed either in the past week.     Today's Vitals: There were no vitals taken for this visit.  ----------------  Post Discharge Medication Reconciliation Status: discharge medications reconciled and changed, per note/orders.    I spent 36 minutes with this patient today. A copy of the visit note was provided to the patient's provider(s).    The patient was sent via [employer] a summary of these recommendations.     Davi Clayton PharmD  [facility] Pharmacist    Phone: [phone]     Telemedicine Visit Details  Type of service:  Telephone visit  Start Time: 2:02 PM  End Time: 2:40 PM  Originating Location (patient location): Home  Distant Location (provider location):  [facility]     Medication Therapy Recommendations  Hepatic encephalopathy (H)    Current Medication: potassium chloride ER (K-TAB) 20 MEQ CR tablet   Rationale: Does not understand instructions - Adherence - Adherence   Recommendation: Provide Adherence Intervention   Status: Patient Agreed - Adherence/Education         Type 2 diabetes mellitus without complication, without long-term current use of insulin (H)    Current Medication:  insulin aspart (NOVOLOG FLEXPEN) 100 UNIT/ML pen   Rationale: Dose too low - Dosage too low - Effectiveness   Recommendation: Referral to Service    Status: Patient Agreed - Adherence/Education

## 2022-02-28 NOTE — PROGRESS NOTES
Pt reported feeling the best that she has in a long time. Pt has been compliant with lactulose and titrating to have 3-5 BMs/day. Denied experiencing confusion and increased lethargy. Pt reported decreased lower extremity edema and is taking lasix 40 mg daily and spironolactone 100 mg daily.    Pt had vitamin E level checked while hospitalized and lab resulted at 1.8. Per Dr. Burgess and Shahzad Hatch (covering for Dr. Cook), AquaDeks multivitamin prescribed (1 tablet by mouth daily). Reviewed lab result and new prescription with pt and pt verbalized understanding.    Pt's PCP arranged palliative home care and pt will have a total of 8 visits (first visit on 3/1). Pt has hospital follow up with PCP on 3/4 and hepatology follow up on 4/22. Will check in with pt in 1 week. Pt verbalized understanding and is agreeable to plan of care.

## 2022-03-01 RX ORDER — PEDI MULTIVIT NO.128/VITAMIN K 500 MCG/ML
1 LIQUID (ML) ORAL DAILY
Qty: 90 CAPSULE | Refills: 3 | Status: SHIPPED | OUTPATIENT
Start: 2022-03-01 | End: 2023-05-23 | Stop reason: ALTCHOICE

## 2022-03-02 LAB — NIACIN SERPL-MCNC: 2.53 UG/ML

## 2022-03-02 NOTE — PROGRESS NOTES
"Pt stated that she would like to take a trip (3/15-3/23) to [place] to visit one of her friends. Pt would be staying with her friend and plans to relax at the beach. Pt continues to feel \"great\" and has been compliant with all medications. Expressed writer's concerns with pt staying on track with medications and having extra lactulose on hand. Reiterated that it is imperative that pt titrate lactulose to achieve 3-5 BMs/day and to confusion. Pt stated that her friend is aware of signs/symptoms of hepatic encephalopathy and knows that pt needs to take more lactulose if exhibiting any of these signs. Pt plans to have lactulose on flight with her and will not deviate form current schedule. Per pt, Roly (pt's spouse) is comfortable and on board with this plan. Pt is ambulating independently and plans to bring cane with to use if needed.     Pt has an appointment with PCP on 3/4. Will check in with pt in 1 week. Pt verbalized understanding and is agreeable to plan of care.   "

## 2022-03-04 RX ORDER — ERGOCALCIFEROL 1.25 MG/1
50000 CAPSULE, LIQUID FILLED ORAL WEEKLY
Qty: 8 CAPSULE | Refills: 0 | Status: SHIPPED | OUTPATIENT
Start: 2022-03-04 | End: 2022-05-25

## 2022-03-09 ENCOUNTER — PATIENT OUTREACH (OUTPATIENT)
Dept: GASTROENTEROLOGY | Facility: CLINIC | Age: 52
End: 2022-03-09
Payer: COMMERCIAL

## 2022-03-09 DIAGNOSIS — K74.60 LIVER CIRRHOSIS SECONDARY TO NASH (H): ICD-10-CM

## 2022-03-09 DIAGNOSIS — K75.81 LIVER CIRRHOSIS SECONDARY TO NASH (H): ICD-10-CM

## 2022-03-09 DIAGNOSIS — E87.6 HYPOKALEMIA: ICD-10-CM

## 2022-03-09 RX ORDER — POTASSIUM CHLORIDE 1500 MG/1
40 TABLET, EXTENDED RELEASE ORAL 2 TIMES DAILY
Qty: 120 TABLET | Refills: 3 | Status: SHIPPED | OUTPATIENT
Start: 2022-03-09 | End: 2022-03-31

## 2022-03-09 NOTE — PROGRESS NOTES
Called pt to check in and review upcoming appointments. Pt stated that she continues to feel well and has been compliant with all medications. Pt confirmed that she picked up prescription for DEKAS Plus and has been taking 1 tablet daily. Denied confusion and averages 3-4 BMs/day with current lactulose regimen. Pt stated that her appetite is improving and she is experiencing decreased nausea.     Pt plans to travel to [place] from 3/15-3/25. Pt will bring extra doses of all medications and will have lactulose on plane ride. Will check in with pt in 2-3 weeks. Pt verbalized understanding and is agreeable to plan of care.

## 2022-03-25 DIAGNOSIS — E55.9 VITAMIN D DEFICIENCY: ICD-10-CM

## 2022-03-25 DIAGNOSIS — K74.60 LIVER CIRRHOSIS SECONDARY TO NASH (H): ICD-10-CM

## 2022-03-25 DIAGNOSIS — K75.81 LIVER CIRRHOSIS SECONDARY TO NASH (H): ICD-10-CM

## 2022-03-25 DIAGNOSIS — K76.82 HEPATIC ENCEPHALOPATHY (H): ICD-10-CM

## 2022-03-25 RX ORDER — RIFAXIMIN 550 MG/1
TABLET ORAL
Qty: 180 TABLET | Refills: 3 | Status: ON HOLD | OUTPATIENT
Start: 2022-03-25 | End: 2022-11-10

## 2022-03-25 RX ORDER — ACETAMINOPHEN 160 MG
TABLET,DISINTEGRATING ORAL
Qty: 90 CAPSULE | Refills: 3 | OUTPATIENT
Start: 2022-03-25

## 2022-03-25 RX ORDER — SPIRONOLACTONE 50 MG/1
TABLET, FILM COATED ORAL
Qty: 30 TABLET | Refills: 3 | OUTPATIENT
Start: 2022-03-25

## 2022-03-25 RX ORDER — ERGOCALCIFEROL 1.25 MG/1
CAPSULE, LIQUID FILLED ORAL
Qty: 8 CAPSULE | Refills: 0 | OUTPATIENT
Start: 2022-03-25

## 2022-03-28 NOTE — PLAN OF CARE
"Ms. Perdomo is here today for a post-operative visit.  She is 14 days status post right elbow bursectomy by Dr. Jackson on 3/14/22. She reports that she is doing well. Pain is minimal. She denies fever, chills, and sweats since the time of the surgery.     Physical exam:    Vitals:    03/28/22 0858   Weight: 74.8 kg (165 lb)   Height: 5' 2" (1.575 m)   PainSc: 0-No pain     Vital signs are stable, patient is afebrile.  Patient is well dressed and well groomed, no acute distress.  Alert and oriented to person, place, and time.  Post op dressing taken down.  Incision is clean, dry and intact.  There is no erythema or exudate.  There is no sign of any infection. She is NVI. Sutures removed without difficulty. Good ROM very minimal stiffness at the elbow.    Pathology   Mass, right elbow, excision:  Benign vascular lesion, consistent with synovial hemangioma    Assessment: status post right elbow bursectomy by Dr. Jackson on 3/14/22    Plan:  Delphine Simms was seen today for post-op evaluation.    Diagnoses and all orders for this visit:    Post-operative state    PO instruction reviewed and provided to patient  Pathology report reviewed and provided to pt   She will call to schedule previously ordered therapy   RTC if needed         " Goal Outcome Evaluation:    Plan of Care Reviewed With: patient     Time: 15:00-23:30     Reason for admit: electrolyte imbalance, hypokalemia and generalized weakness  Vitals: WNL  Activity: Activity as tolerated, SBA  Neuro: Alert and oriented x4 with forgetfulness  Mood/Behavior: Calm and cooperative  Lines/Drains: PIV on L, SL  Cardiac: WNL  Resp: Lung sounds clear  Diet: High calorie high protein diet, with fair appetite  GI/: Able to void without difficulty, had a couple of episodes of loose BM due to lactulose regimen.   Skin: Swelling on BLE and slight swelling on L arm noted just above the elbow, no pain , IV not infiltrated. Provider notified, orders made and carried out.  Pain: No c/o pain this shift     New this shift: Refused warm packs to the L arm.     Plan:  Continue with POC.

## 2022-03-29 ENCOUNTER — TELEPHONE (OUTPATIENT)
Dept: GASTROENTEROLOGY | Facility: CLINIC | Age: 52
End: 2022-03-29
Payer: COMMERCIAL

## 2022-03-29 NOTE — TELEPHONE ENCOUNTER
Prior Authorization Specialty Medication Request    Medication/Dose: xifaxan 550 mg tablets  ICD code (if different than what is on RX): K72.90  Previously Tried and Failed:  Lactulose alone    Important Lab Values:   Rationale: Xifaxan helps to lower the toxin build up in the blood while lactulose works by cleansing the toxin build up in the liver. Adjunctive therapy: http://onlinelibrary.darden.com/doi/10.1111/fuad.92812/full       Insurance Name:   Insurance ID:   Insurance Phone Number:     Pharmacy Information (if different than what is on RX)  Name:    Phone:

## 2022-03-29 NOTE — TELEPHONE ENCOUNTER
Prior Authorization Approval          Authorization Effective Date: 2/27/2022  Authorization Expiration Date: 9/25/2022  Medication: xifaxan 550 mg tablets  Approved Dose/Quantity: 60 per 30 days  Reference #: [id number]   Insurance Company: [employer] - Phone [phone] Fax [phone]  Expected CoPay:       Which Pharmacy is filling the prescription (Not needed for infusion/clinic administered): [facility] [id number] [place] [address]  Pharmacy Notified: Yes  Patient Notified: No

## 2022-03-29 NOTE — TELEPHONE ENCOUNTER
Central Prior Authorization Team   Phone: [phone]      PA Initiation - I've submitted the P/A request to insurance as an URGENT request.    Medication: xifaxan 550 mg tablets  Insurance Company: [employer] - Phone [phone] Fax [phone]  Pharmacy Filling the Rx: [facility] [id number] [place] [facility] [address] [facility]  Filling Pharmacy Phone: [phone]  Filling Pharmacy Fax:    Start Date: 3/29/2022

## 2022-03-31 DIAGNOSIS — K75.81 LIVER CIRRHOSIS SECONDARY TO NASH (H): ICD-10-CM

## 2022-03-31 DIAGNOSIS — K74.60 LIVER CIRRHOSIS SECONDARY TO NASH (H): ICD-10-CM

## 2022-03-31 DIAGNOSIS — E87.6 HYPOKALEMIA: ICD-10-CM

## 2022-03-31 RX ORDER — POTASSIUM CHLORIDE 1500 MG/1
40 TABLET, EXTENDED RELEASE ORAL 2 TIMES DAILY
Qty: 360 TABLET | Refills: 3 | Status: ON HOLD | OUTPATIENT
Start: 2022-03-31 | End: 2022-07-14

## 2022-04-01 ENCOUNTER — PATIENT OUTREACH (OUTPATIENT)
Dept: GASTROENTEROLOGY | Facility: CLINIC | Age: 52
End: 2022-04-01
Payer: COMMERCIAL

## 2022-04-01 NOTE — PROGRESS NOTES
Pt reported doing well since returning from vacation in [place]. Pt stated that she was compliant with all medications while gone and was able to stay on a routine. Denied experiencing confusion. Pt is taking lactulose 3 times daily and averaging 3-5 BMs/day. Denied ascites and lower extremity edema. Pt has hepatology follow up with Dr. Cook on 4/22. Will check in with pt in 2-3 weeks. Pt verbalized understanding and is agreeable to plan of care.

## 2022-04-09 ENCOUNTER — HEALTH MAINTENANCE LETTER (OUTPATIENT)
Age: 52
End: 2022-04-09

## 2022-04-20 ENCOUNTER — PATIENT OUTREACH (OUTPATIENT)
Dept: GASTROENTEROLOGY | Facility: CLINIC | Age: 52
End: 2022-04-20
Payer: COMMERCIAL

## 2022-04-20 NOTE — PROGRESS NOTES
Connected with pt for check in and to remind of upcoming appointment on 4/22. Pt denied lower extremity and ascites. Pt reported compliance with xifaxan and lactulose and is having 3-4 BMs/day. Denied experiencing confusion. Pt expressed feeling stressed with helping to move her aunt to a nursing home. Pt stated that she hasn't been making the best food choices and resting as much as she should. Reiterated the importance of pt prioritizing her health and advocating for self with family. Pt stated that out of town family is coming this week to assist aunt with move and pt needs to reschedule hepatology appointment on 4/22. Encouraged pt to keep this appointment but pt stated that it was important to her to be with family during this time. Pt rescheduled with Dr. Cook on 8/8.    Will check in with pt in 2-3 weeks. Pt verbalized understanding and is agreeable to plan of care.

## 2022-04-28 DIAGNOSIS — K74.60 LIVER CIRRHOSIS SECONDARY TO NASH (H): ICD-10-CM

## 2022-04-28 DIAGNOSIS — K75.81 LIVER CIRRHOSIS SECONDARY TO NASH (H): ICD-10-CM

## 2022-04-28 DIAGNOSIS — E55.9 VITAMIN D DEFICIENCY: ICD-10-CM

## 2022-05-05 ENCOUNTER — TELEPHONE (OUTPATIENT)
Dept: TRANSPLANT | Facility: CLINIC | Age: 52
End: 2022-05-05
Payer: COMMERCIAL

## 2022-05-05 NOTE — LETTER
PHYSICIAN ORDERS    DATE & TIME ISSUED: May 5, 2022 9:34 AM  PATIENT NAME: Susan Puckett   : 1970     [facility] MR# : [id number]     DIAGNOSIS:  Cirrhosis  ICD-10 CODE: K75.81  Orders  1 year after issue.    Please have the following labs drawn monthly, per patient scheduling. These labs must be drawn all together on the same day when done:     INR     Total Bili     Creatinine     Albumin     Sodium  Additionally, please have the following drawn at next lab draw:    Vitamin D level  For clinical questions, please contact Kathy Munoz RN, at [phone]. PLEASE FAX RESULTS AS SOON AS POSSIBLE TO [phone], ATTN:LabDE    .

## 2022-05-05 NOTE — TELEPHONE ENCOUNTER
Spoke with Susan    Doing OK, but aunt in hospice and dealing with a lot of family issues    She needs updated MELD labs by 5/16, also needs updated Vit D level (see clinic coordinator note)    Lab orders re-sent to [facility] per patient request.

## 2022-05-13 NOTE — PROGRESS NOTES
Per chart review, pt has not had requested labs drawn. Attempted to reach patient for check in, no answer, message left requesting call back, number provided.

## 2022-05-19 ENCOUNTER — TELEPHONE (OUTPATIENT)
Dept: TRANSPLANT | Facility: CLINIC | Age: 52
End: 2022-05-19
Payer: COMMERCIAL

## 2022-05-19 NOTE — PROGRESS NOTES
Attempted to reach patient for check in, no answer, message left requesting call back, number provided. Also attempted to reach pt's spouse, Roly, no answer and message left.

## 2022-05-19 NOTE — TELEPHONE ENCOUNTER
lvm for patient    MELD labs  a of midnight..    [email or web address] message sent  to get labs has not been opened.  Also has not reutrned calls to Eli, clinic Coordinator.  Will reach out to  if no response received.

## 2022-05-20 ENCOUNTER — TELEPHONE (OUTPATIENT)
Dept: TRANSPLANT | Facility: CLINIC | Age: 52
End: 2022-05-20
Payer: COMMERCIAL

## 2022-05-20 DIAGNOSIS — E55.9 VITAMIN D DEFICIENCY: Primary | ICD-10-CM

## 2022-05-20 DIAGNOSIS — K74.60 CIRRHOSIS OF LIVER (H): ICD-10-CM

## 2022-05-20 NOTE — LETTER
PHYSICIAN ORDERS      Re-sending- patient has appointment 22    DATE & TIME ISSUED: May 5, 2022 9:34 AM  PATIENT NAME: Susan Puckett   : 1970     [facility] MR# : [id number]     DIAGNOSIS:  Cirrhosis  ICD-10 CODE: K75.81  Orders  1 year after issue.  Please have the following labs drawn monthly, per patient scheduling. These labs must be drawn all together on the same day when done:     INR     Total Bili     Creatinine     Albumin     Sodium  Additionally, please have the following drawn at next lab draw:    Vitamin D level  For clinical questions, please contact Kathy Munoz RN, at [phone]. PLEASE FAX RESULTS AS SOON AS POSSIBLE TO [phone], ATTN:Sonja    .   Please advise on lab orders.   Most recent labs from 1/24 note BMP, A1C in 6 months

## 2022-05-20 NOTE — TELEPHONE ENCOUNTER
Susan returned call. Had death in family (aunt), been involved with  planning.  Tearful during voice mail and conversation. She will get updated labs today at [facility] today for MELD and Vit D re-check, re-sent orders and spoke to [facility] staff    Encouraged her to get follow up appt scheduled with Dr. Gaines as she has kala dealing with a lot of issues and very tearful. She said she is calling Monday to make several appts and will do so.

## 2022-05-25 RX ORDER — ERGOCALCIFEROL 1.25 MG/1
CAPSULE, LIQUID FILLED ORAL
Qty: 8 CAPSULE | Refills: 0 | OUTPATIENT
Start: 2022-05-25

## 2022-05-25 RX ORDER — CHOLECALCIFEROL (VITAMIN D3) 50 MCG
1 TABLET ORAL DAILY
Qty: 90 TABLET | Refills: 3 | Status: SHIPPED | OUTPATIENT
Start: 2022-05-25 | End: 2022-09-15

## 2022-05-27 ENCOUNTER — PATIENT OUTREACH (OUTPATIENT)
Dept: GASTROENTEROLOGY | Facility: CLINIC | Age: 52
End: 2022-05-27
Payer: COMMERCIAL

## 2022-05-27 NOTE — PROGRESS NOTES
Pt's aunt recently passed away. Pt stated that caring for her aunt while sick and her aunt's passing has been emotionally and physically draining. Offered condolences and provided emotional support. Discussed pt scheduling a follow up appointment with Sancho Gaines and pt was agreeable to this.    Denied lower extremity edema and has been compliant with diuretics (furosemide 40 mg daily and spironolactone 100 mg daily). Pt is taking xifaxan and titrating lactulose to have 3-5 BMs/day. Denied experiencing confusion.     Pt had vitamin D level checked and, per Dr. Cook, pt should start vitamin D3 50 mcg tablet (2000 units) once daily and calcium carbonate-vitamin D 500-200 mg tablet by mouth 2 times daily. Pt stated that she picked up these prescriptions and would start taking as prescribed. Will recheck vitamin D in 2 months.     Pt stated that she is having anemia workup done through [facility] and will be getting lab work drawn next week. Confirmed with pt that hepatology clinic will be able to access those results.     Will check in with pt in 2-3 weeks. Pt verbalized understanding and is agreeable to plan of care.

## 2022-06-04 ENCOUNTER — HEALTH MAINTENANCE LETTER (OUTPATIENT)
Age: 52
End: 2022-06-04

## 2022-06-14 ENCOUNTER — TELEPHONE (OUTPATIENT)
Dept: GASTROENTEROLOGY | Facility: CLINIC | Age: 52
End: 2022-06-14
Payer: COMMERCIAL

## 2022-06-14 NOTE — TELEPHONE ENCOUNTER
[facility] Call Center    Phone Message    May a detailed message be left on voicemail: yes     Reason for Call: Other:     MRI was sent through this morning and report will be faxed today as well.    Action Taken: Message routed to:  [facility] hep    Travel Screening: Not Applicable

## 2022-06-17 ENCOUNTER — TELEPHONE (OUTPATIENT)
Dept: TRANSPLANT | Facility: CLINIC | Age: 52
End: 2022-06-17
Payer: COMMERCIAL

## 2022-06-17 NOTE — TELEPHONE ENCOUNTER
Spoke with Susan, let her know images will be reviewed at next weeks conference as unable to review today due to timing. Explained I will update her after.      Explained it may be possible that we would just be repeating MRI in 3 mos    Patient expressed ongoing frustration that she has been listed, low MELD so no organ offers.  Very tearful.  Also recently dealing with death in the family (aunt).  Encouraged her to continue support group and to schedule with Dr. Gaines for ongoing support.

## 2022-06-22 ENCOUNTER — VIRTUAL VISIT (OUTPATIENT)
Dept: BEHAVIORAL HEALTH | Facility: CLINIC | Age: 52
End: 2022-06-22
Payer: COMMERCIAL

## 2022-06-22 DIAGNOSIS — F33.1 MAJOR DEPRESSIVE DISORDER, RECURRENT EPISODE, MODERATE (H): Primary | ICD-10-CM

## 2022-06-22 DIAGNOSIS — F41.1 GENERALIZED ANXIETY DISORDER: ICD-10-CM

## 2022-06-22 PROCEDURE — 90834 PSYTX W PT 45 MINUTES: CPT | Mod: TEL | Performed by: PSYCHOLOGIST

## 2022-06-22 ASSESSMENT — PATIENT HEALTH QUESTIONNAIRE - PHQ9
SUM OF ALL RESPONSES TO PHQ QUESTIONS 1-9: 13
10. IF YOU CHECKED OFF ANY PROBLEMS, HOW DIFFICULT HAVE THESE PROBLEMS MADE IT FOR YOU TO DO YOUR WORK, TAKE CARE OF THINGS AT HOME, OR GET ALONG WITH OTHER PEOPLE: VERY DIFFICULT
SUM OF ALL RESPONSES TO PHQ QUESTIONS 1-9: 13

## 2022-06-22 NOTE — PROGRESS NOTES
"[facility]: Integrated Behavioral Health  2022        Behavioral Health Clinician Progress Note    Patient Name: Susan Puckett           Service Type: Phone Visit      Service Location:  Phone visit      Session Start Time: 3:40  Session End Time: 4:18      Session Length: 38 - 52      Attendees: Patient    Visit Activities (Refresh list every visit): C Only and Phone Encounter    Service Modality:  Phone Visit:      Provider verified identity through the following two step process.  Patient provided:  Patient  and Patient is known previously to provider    The patient has been notified of the following:      \"We have found that certain health care needs can be provided without the need for a face to face visit.  This service lets us provide the care you need with a phone conversation.       I will have full access to your [facility] medical record during this entire phone call.   I will be taking notes for your medical record.      Since this is like an office visit, we will bill your insurance company for this service.       There are potential benefits and risks of telephone visits (e.g. limits to patient confidentiality) that differ from in-person visits.?Confidentiality still applies for telephone services, and nobody will record the visit.  It is important to be in a quiet, private space that is free of distractions (including cell phone or other devices) during the visit.??      If during the course of the call I believe a telephone visit is not appropriate, you will not be charged for this service\"     Consent has been obtained for this service by care team member: Yes     Diagnostic Assessment Date: Update on 2022  Treatment Plan Review Date 2022  See Flowsheets for today's PHQ-9 and AILIN-7 results  Previous PHQ-9:   PHQ-9 SCORE 2/10/2021 2022 2022   PHQ-9 Total Score [place] 8 (Mild depression) 16 (Moderately severe depression) 13 " (Moderate depression)   PHQ-9 Total Score 8 16 13     Previous AILIN-7:   AILIN-7 SCORE 11/5/2020 2/10/2021   Total Score 10 (moderate anxiety) 3 (minimal anxiety)   Total Score 10 3       LUIS LEVEL:  LUIS Score (Last Two) 10/18/2020   LUIS Raw Score 35   Activation Score 72.1   LUIS Level 3       DATA  Extended Session (60+ minutes): No  Interactive Complexity: No  Crisis: No    Treatment Objective(s) Addressed in This Session:  Target Behavior(s): disease management/lifestyle changes      Depressed Mood: Decrease frequency and intensity of feeling down, depressed, hopeless  Relationship Problems: will address relationship difficulties in a more adaptive manner  Psychological distress related to Chronic Disease Management    Current Stressors / Issues:  Susan completed a [facility] follow-up today. Susan reports returning to behavioral health services due to ongoing struggles with sleep (reports sleeping on the couch most nights), low appetite, and low energy. She also reports concerns with sadness and a sense of loss with her social network; she notes struggling with how little her friends reach out. She also reports struggling to find the energy/motivation to reach out to others herself and feels lonely as a result. Reports completing some lab work recently which showed anemia and she notes feeling fearful to ask questions to her care team due to worry about finding out about having something serious like cancer. Susan states she is considering finding a second opinion to get results faster about her health, as she reports she is frustrated and anxious about waiting so often for results. This writer provided supportive therapy. After about 35 minutes, Susan appeared to sound unresponsive on the phone and she indicated feeling very tired and falling asleep. We scheduled a follow-up in a couple weeks to continue supporting her treatment of anxiety and transplant-related stress.     Answers for HPI/ROS submitted by the patient on  6/22/2022  If you checked off any problems, how difficult have these problems made it for you to do your work, take care of things at home, or get along with other people?: Very difficult  PHQ9 TOTAL SCORE: 13        Progress on Treatment Objective(s) / Homework:  Worsening - ACTION (Actively working towards change); Intervened by reinforcing change plan / affirming steps taken    Supportive and solution-focused counseling emphasized today    Care Plan review completed: Yes    Medication Review:  No changes to current psychiatric medication(s)    Medication Compliance:  Yes    Changes in Health Issues:   Yes: Chronic disease management, Associated Psychological Distress    Chemical Use Review:   Substance Use: Chemical use reviewed, no active concerns identified      Tobacco Use: No current tobacco use.      Assessment: Current Emotional / Mental Status (status of significant symptoms):  Risk status (Self / Other harm or suicidal ideation)  Patient denies a history of suicidal ideation, suicide attempts, self-injurious behavior, homicidal ideation, homicidal behavior and and other safety concerns     Patient denies current fears or concerns for personal safety.  Patient denies current or recent suicidal ideation or behaviors.  Patient denies current or recent homicidal ideation or behaviors.  Patient denies current or recent self injurious behavior or ideation.  Patient denies other safety concerns.     [place] Suicide Severity Rating Scale (Short Version)  [place] Suicide Severity Rating (Short Version) 12/13/2021 12/26/2021 12/26/2021 1/13/2022 1/26/2022 2/14/2022 2/19/2022   Over the past 2 weeks have you felt down, depressed, or hopeless? no patient unable to complete yes yes no no yes   Over the past 2 weeks have you had thoughts of killing yourself? no patient unable to complete no no no no no   Have you ever attempted to kill yourself? no patient unable to complete no no no no no     [place] Suicide  Severity Rating Scale (Lifetime/Recent)  [place] Suicide Severity Rating (Lifetime/Recent) 1/24/2022   Wish to be Dead (Lifetime) No   Non-Specific Active Suicidal Thoughts (Lifetime) No   Most Severe Ideation Rating (Lifetime) NA   Frequency (Lifetime) NA   Duration (Lifetime) NA   Controllability (Lifetime) NA   Protective Factors  (Lifetime) NA   Reasons for Ideation (Lifetime) NA   RETIRED: 1. Wish to be Dead (Recent) No   RETIRED: 2. Non-Specific Active Suicidal Thoughts (Recent) No   3. Active Suicidal Ideation with any Methods (Not Plan) Without Intent to Act (Lifetime) No   RETIRED: 3. Active Suicidal Ideation with any Methods (Not Plan) Without Intent to Act (Recent) No   RETIRE: 4. Active Suicidal Ideation with Some Intent to Act, Without Specific Plan (Lifetime) No   4. Active Suicidal Ideation with Some Intent to Act, Without Specific Plan (Recent) No   RETIRE: 5. Active Suicidal Ideation with Specific Plan and Intent (Lifetime) No   RETIRED: 5. Active Suicidal Ideation with Specific Plan and Intent (Recent) No   Most Severe Ideation Rating (Past Month) NA   Frequency (Past Month) NA   Duration (Past Month) NA   Controllability (Past Month) NA   Protective Factors (Past Month) NA   Reasons for Ideation (Past Month) NA   Actual Attempt (Lifetime) No   Actual Attempt (Past 3 Months) No   Has subject engaged in non-suicidal self-injurious behavior? (Lifetime) No   Has subject engaged in non-suicidal self-injurious behavior? (Past 3 Months) No   Interrupted Attempts (Lifetime) No   Interrupted Attempts (Past 3 Months) No   Aborted or Self-Interrupted Attempt (Lifetime) No   Aborted or Self-Interrupted Attempt (Past 3 Months) No   Preparatory Acts or Behavior (Lifetime) No   Preparatory Acts or Behavior (Past 3 Months) No   Most Recent Attempt Actual Lethality Code NA   Most Lethal Attempt Actual Lethality Code NA   Initial/First Attempt Actual Lethality Code NA       A safety and risk management plan has  not been developed at this time, however patient was encouraged to call [place] [phone] should there be a change in any of these risk factors.    Appearance:   Appropriate   Eye Contact:   Good   Psychomotor Behavior: Normal   Attitude:   Cooperative  Friendly Pleasant  Orientation:   All  Speech   Rate / Production: Normal    Volume:  Normal   Mood:    Sad   Affect:    Appropriate   Thought Content:  Clear   Thought Form:  Coherent  Logical   Insight:    Good     Diagnoses:  1. Major depressive disorder, recurrent episode, moderate (H)    2. Generalized anxiety disorder        Collateral Reports Completed:  Not Applicable    Plan: (Homework, other):  Susan agreed to return to counseling in about two weeks. She was also given Cognitive Behavioral Therapy skills to practice when experiencing depression and sleep Hygeine recommendations, including a handout with tips and strategies.  CD Recommendations: No indications of CD issues. Sancho Gaines LP     ______________________________________________________________________                                              Individual Treatment Plan    Patient's Name: Susan Puckett  YOB: 1970    Date of Creation: 2/1/2022  Date Treatment Plan Last Reviewed/Revised: 6/22/2022    DSM5 Diagnoses: 296.32 (F33.1) Major Depressive Disorder, Recurrent Episode, Moderate _ or 300.02 (F41.1) Generalized Anxiety Disorder  Psychosocial / Contextual Factors: SOT  PROMIS (reviewed every 90 days): IP      Referral / Collaboration:  Referral to another professional/service is not indicated at this time..    Anticipated number of session for this episode of care: 6-8  Anticipation frequency of session: Every other week  Anticipated Duration of each session: 38-52 minutes  Treatment plan will be reviewed in 90 days or when goals have been changed.       MeasurableTreatment Goal(s) related to diagnosis / functional impairment(s)  Goal 1: Patient will experience a reduction in  "depressive symptoms along with a corresponding increase in positive emotion and life satisfaction.    Objective #A (Patient Action)    Patient will Increase interest, engagement, and pleasure in doing things.  Status: Continued - Date(s): 6/22/2022    Intervention(s)  Therapist will help patient identify pleasant and mastery oriented events that elicit positive, relaxed mood.    Objective #B  Patient will Decrease frequency and intensity of feeling down, depressed, hopeless.  Status: Continued - Date(s): 6/22/2022    Intervention(s)  Therapist will introduce patient to cognitive-behavioral and acceptance and commitment therapy topics aimed to help reduce depression and anxiety    Objective #C  Patient will Identify negative self-talk and behaviors: challenge core beliefs, myths, and actions  Improve concentration, focus, and mindfulness in daily activities .  Status: Continued - Date(s): 6/22/2022    Intervention(s)  Therapist will help patient identify and manage negative self-talk and automatic thoughts; introduce patient to cognitive distortions; help patient develop cognitive diffusion techniques      Goal 2: Patient will experience a reduction in anxious symptoms, along with a corresponding increase in relaxed emotional states and life satisfaction.      Objective #A (Patient Action)  Patient will use cognitive-behavioral and thought diffusion strategies identified in therapy to challenge anxious thoughts.    Status: Continued - Date(s): 6/22/2022    Intervention(s)  Therapist will utilize CBT and ACT ideas to help patient challenge anxious thoughts and reduce intensity/duration of anxious distress    Objective #B  Patient will use \"worry time\" each day for 15 minutes of scheduled worry and then defer obsessive or anxious thinking until the next structured worry time.    Status: Continued - Date(s): 6/22/2022    Intervention(s)  Therapist will teach patient how to effectively utilize worry time and/or thought " logs/journals each day and incorporate more relaxation behaviors into their routine.    Objective #C  Patient will identify the stressors which contribute to feelings of anxiety  Patient will increase engagement in adaptive coping skills and recreational activities, such as exercise and healthy socialization, to manage distress.  Status: Continued - Date(s): 6/22/2022    Intervention(s)  Therapist will help patient identify triggers/situational factors that contribute to anxiety and behavioral skills aimed to manage anxious distress.        Other Possible Therapeutic Intervention(s):    Psycho-education regarding mental health diagnoses and treatment options    Supportive Therapy    Provide affirmations, reflections, and establish working rapport    Emphasize and reflect on strength of therapeutic relationship    Skills training    Explore skills useful to client in current situation.    Skills include assertiveness, communication, conflict management, problem-solving, relaxation, etc.    Solution-Focused Therapy    Explore patterns in patient's relationships and discuss options for new behaviors.    Explore patterns in patient's actions and choices and discuss options for new behaviors.    Cognitive-behavioral Therapy    Discuss common cognitive distortions, identify them in patient's life.    Explore ways to challenge, replace, and act against these cognitions.    Acceptance and Commitment Therapy    Explore and identify important values in patient's life.    Discuss ways to commit to behavioral activation around these values.    Psychodynamic psychotherapy    Discuss patient's emotional dynamics and issues and how they impact behaviors.    Explore patient's history of relationships and how they impact present behaviors.    Explore how to work with and make changes in these schemas and patterns.    Narrative Therapy    Explore the patient's story of his/her life from his/her perspective.    Explore alternate ways  of understanding their experience, identifying exceptions, developing new themes.    Interpersonal Psychotherapy    Explore patterns in relationships that are effective or ineffective at helping patient reach their goals, find satisfying experience.    Discuss new patterns or behaviors to engage in for improved social functioning.    Behavioral Activation    Discuss steps patient can take to become more involved in meaningful activity.    Identify barriers to these activities and explore possible solutions.    Mindfulness-Based Strategies    Discuss skills based on development and application of mindfulness.    Skills drawn from compassion-focused therapy, dialectical behavior therapy, mindfulness-based stress reduction, mindfulness-based cognitive therapy, etc.      Patient has reviewed and agreed to the above plan.      Sancho Gaines Psy.D, LP   Behavioral Health Clinician   -[facility] [place]     6/22/2022

## 2022-06-24 DIAGNOSIS — K74.60 LIVER CIRRHOSIS SECONDARY TO NASH (H): Primary | ICD-10-CM

## 2022-06-24 DIAGNOSIS — K75.81 LIVER CIRRHOSIS SECONDARY TO NASH (H): Primary | ICD-10-CM

## 2022-06-24 NOTE — LETTER
PHYSICIAN ORDERS      DATE & TIME ISSUED: 2022 3:48 PM  PATIENT NAME: Susan Puckett   : 1970     [facility] MR# : [id number]     DIAGNOSIS:  Cirrhosis  ICD-10 CODE: K74.6  Orders  1 year after issue.    Please add the following to next lab draw, then drawn every 3 months.:     Alpha- fetoprotein (AFP)     For clinical questions, please contact Kathy Munoz RN, at [phone]. PLEASE FAX RESULTS AS SOON AS POSSIBLE TO [phone], ATTN:Sonja    .

## 2022-06-24 NOTE — PROGRESS NOTES
MRI dated 6/9/22 reviewed at interdisciplinary tumor conference.  Single lesion with no enhancement, some possible washout.  LIRADS 3-4.    Plan:  MRI in 3-6 mos      Spoke with Susan.  Let her know that MRI does not indicate HCC>  One area not concerning, but will watch a little closer with MRI 3-6 mos.  Patient very anxious about unknowns, emphasize this is positive and we will continue to watch for anything concerning.      Order placed for MRI in 3-6 mos here.  Will get AFP at next lab draw and again with MRI in 3-6 mos.  Sent lab orders to Kaycee Roldan

## 2022-06-29 ENCOUNTER — PATIENT OUTREACH (OUTPATIENT)
Dept: GASTROENTEROLOGY | Facility: CLINIC | Age: 52
End: 2022-06-29

## 2022-07-05 ENCOUNTER — VIRTUAL VISIT (OUTPATIENT)
Dept: BEHAVIORAL HEALTH | Facility: CLINIC | Age: 52
End: 2022-07-05
Payer: COMMERCIAL

## 2022-07-05 DIAGNOSIS — Z53.9 ERRONEOUS ENCOUNTER--DISREGARD: Primary | ICD-10-CM

## 2022-07-05 NOTE — PROGRESS NOTES
Appointment cancelled by patient - reports family emergency. Will call back to reschedule.     Sancho Gaines Psy.D, LP   Behavioral Health Clinician   -[facility] [place]

## 2022-07-09 ENCOUNTER — APPOINTMENT (OUTPATIENT)
Dept: CT IMAGING | Facility: CLINIC | Age: 52
DRG: 442 | End: 2022-07-09
Attending: EMERGENCY MEDICINE
Payer: COMMERCIAL

## 2022-07-09 ENCOUNTER — HOSPITAL ENCOUNTER (INPATIENT)
Facility: CLINIC | Age: 52
LOS: 5 days | Discharge: HOME | DRG: 442 | End: 2022-07-14
Attending: EMERGENCY MEDICINE | Admitting: HOSPITALIST
Payer: COMMERCIAL

## 2022-07-09 ENCOUNTER — TELEPHONE (OUTPATIENT)
Dept: TRANSPLANT | Facility: CLINIC | Age: 52
End: 2022-07-09

## 2022-07-09 ENCOUNTER — APPOINTMENT (OUTPATIENT)
Dept: GENERAL RADIOLOGY | Facility: CLINIC | Age: 52
DRG: 442 | End: 2022-07-09
Attending: EMERGENCY MEDICINE
Payer: COMMERCIAL

## 2022-07-09 ENCOUNTER — APPOINTMENT (OUTPATIENT)
Dept: ULTRASOUND IMAGING | Facility: CLINIC | Age: 52
DRG: 442 | End: 2022-07-09
Attending: NURSE PRACTITIONER
Payer: COMMERCIAL

## 2022-07-09 DIAGNOSIS — R41.0 CONFUSION: ICD-10-CM

## 2022-07-09 DIAGNOSIS — Z98.84 H/O GASTRIC BYPASS: ICD-10-CM

## 2022-07-09 DIAGNOSIS — E16.2 HYPOGLYCEMIA: ICD-10-CM

## 2022-07-09 DIAGNOSIS — R73.9 HYPERGLYCEMIA: ICD-10-CM

## 2022-07-09 DIAGNOSIS — K86.89 PANCREATIC INSUFFICIENCY: ICD-10-CM

## 2022-07-09 DIAGNOSIS — E87.6 HYPOKALEMIA: ICD-10-CM

## 2022-07-09 DIAGNOSIS — Z98.84 HISTORY OF GASTRIC BYPASS: ICD-10-CM

## 2022-07-09 DIAGNOSIS — K76.82 HEPATIC ENCEPHALOPATHY (H): ICD-10-CM

## 2022-07-09 DIAGNOSIS — K75.81 NASH (NONALCOHOLIC STEATOHEPATITIS): ICD-10-CM

## 2022-07-09 DIAGNOSIS — K75.81 LIVER CIRRHOSIS SECONDARY TO NASH (H): ICD-10-CM

## 2022-07-09 DIAGNOSIS — E11.65 TYPE 2 DIABETES MELLITUS WITH HYPERGLYCEMIA, WITH LONG-TERM CURRENT USE OF INSULIN (H): ICD-10-CM

## 2022-07-09 DIAGNOSIS — Z20.822 CONTACT WITH AND (SUSPECTED) EXPOSURE TO COVID-19: ICD-10-CM

## 2022-07-09 DIAGNOSIS — R05.9 COUGH: ICD-10-CM

## 2022-07-09 DIAGNOSIS — R41.82 ALTERED MENTAL STATUS, UNSPECIFIED ALTERED MENTAL STATUS TYPE: Primary | ICD-10-CM

## 2022-07-09 DIAGNOSIS — E11.9 TYPE 2 DIABETES MELLITUS WITHOUT COMPLICATION, WITHOUT LONG-TERM CURRENT USE OF INSULIN (H): ICD-10-CM

## 2022-07-09 DIAGNOSIS — K74.60 LIVER CIRRHOSIS SECONDARY TO NASH (H): ICD-10-CM

## 2022-07-09 DIAGNOSIS — K72.90 DECOMPENSATED HEPATIC CIRRHOSIS (H): ICD-10-CM

## 2022-07-09 DIAGNOSIS — K74.60 DECOMPENSATED HEPATIC CIRRHOSIS (H): ICD-10-CM

## 2022-07-09 DIAGNOSIS — Z79.4 TYPE 2 DIABETES MELLITUS WITH HYPERGLYCEMIA, WITH LONG-TERM CURRENT USE OF INSULIN (H): ICD-10-CM

## 2022-07-09 LAB
ALBUMIN SERPL BCG-MCNC: 3 G/DL (ref 3.5–5.2)
ALBUMIN UR-MCNC: NEGATIVE MG/DL
ALP SERPL-CCNC: 132 U/L (ref 35–104)
ALT SERPL W P-5'-P-CCNC: 14 U/L (ref 10–35)
AMMONIA PLAS-SCNC: 56 UMOL/L (ref 11–51)
ANION GAP SERPL CALCULATED.3IONS-SCNC: 9 MMOL/L (ref 7–15)
APPEARANCE UR: CLEAR
APTT PPP: 38 SECONDS (ref 22–38)
AST SERPL W P-5'-P-CCNC: 40 U/L (ref 10–35)
BASOPHILS # BLD AUTO: 0 10E3/UL (ref 0–0.2)
BASOPHILS NFR BLD AUTO: 1 %
BILIRUB SERPL-MCNC: 2.1 MG/DL
BILIRUB UR QL STRIP: NEGATIVE
BUN SERPL-MCNC: 12.2 MG/DL (ref 6–20)
CALCIUM SERPL-MCNC: 8.7 MG/DL (ref 8.6–10)
CHLORIDE SERPL-SCNC: 101 MMOL/L (ref 98–107)
COLOR UR AUTO: ABNORMAL
CREAT SERPL-MCNC: 0.97 MG/DL (ref 0.51–0.95)
DEPRECATED HCO3 PLAS-SCNC: 22 MMOL/L (ref 22–29)
EOSINOPHIL # BLD AUTO: 0.1 10E3/UL (ref 0–0.7)
EOSINOPHIL NFR BLD AUTO: 4 %
ERYTHROCYTE [DISTWIDTH] IN BLOOD BY AUTOMATED COUNT: 14.8 % (ref 10–15)
FLUAV RNA SPEC QL NAA+PROBE: NEGATIVE
FLUBV RNA RESP QL NAA+PROBE: NEGATIVE
GFR SERPL CREATININE-BSD FRML MDRD: 71 ML/MIN/1.73M2
GLUCOSE BLDC GLUCOMTR-MCNC: 143 MG/DL (ref 70–99)
GLUCOSE BLDC GLUCOMTR-MCNC: 199 MG/DL (ref 70–99)
GLUCOSE BLDC GLUCOMTR-MCNC: 508 MG/DL (ref 70–99)
GLUCOSE BLDC GLUCOMTR-MCNC: 67 MG/DL (ref 70–99)
GLUCOSE BLDC GLUCOMTR-MCNC: >600 MG/DL (ref 70–99)
GLUCOSE SERPL-MCNC: 513 MG/DL (ref 70–99)
GLUCOSE UR STRIP-MCNC: >=1000 MG/DL
HCT VFR BLD AUTO: 31.9 % (ref 35–47)
HGB BLD-MCNC: 10.4 G/DL (ref 11.7–15.7)
HGB UR QL STRIP: NEGATIVE
HOLD SPECIMEN: NORMAL
IMM GRANULOCYTES # BLD: 0 10E3/UL
IMM GRANULOCYTES NFR BLD: 0 %
INR PPP: 1.49 (ref 0.85–1.15)
KETONES BLD-SCNC: 0.2 MMOL/L (ref 0–0.6)
KETONES UR STRIP-MCNC: NEGATIVE MG/DL
LEUKOCYTE ESTERASE UR QL STRIP: NEGATIVE
LIPASE SERPL-CCNC: 6 U/L (ref 13–60)
LYMPHOCYTES # BLD AUTO: 0.8 10E3/UL (ref 0.8–5.3)
LYMPHOCYTES NFR BLD AUTO: 22 %
MCH RBC QN AUTO: 33.9 PG (ref 26.5–33)
MCHC RBC AUTO-ENTMCNC: 32.6 G/DL (ref 31.5–36.5)
MCV RBC AUTO: 104 FL (ref 78–100)
MONOCYTES # BLD AUTO: 0.3 10E3/UL (ref 0–1.3)
MONOCYTES NFR BLD AUTO: 9 %
NEUTROPHILS # BLD AUTO: 2.3 10E3/UL (ref 1.6–8.3)
NEUTROPHILS NFR BLD AUTO: 64 %
NITRATE UR QL: NEGATIVE
NRBC # BLD AUTO: 0 10E3/UL
NRBC BLD AUTO-RTO: 0 /100
PH UR STRIP: 5 [PH] (ref 5–7)
PLATELET # BLD AUTO: 99 10E3/UL (ref 150–450)
POTASSIUM SERPL-SCNC: 3.9 MMOL/L (ref 3.4–5.3)
PROT SERPL-MCNC: 6.4 G/DL (ref 6.4–8.3)
RBC # BLD AUTO: 3.07 10E6/UL (ref 3.8–5.2)
RBC URINE: 0 /HPF
RSV RNA SPEC NAA+PROBE: NEGATIVE
SARS-COV-2 RNA RESP QL NAA+PROBE: NEGATIVE
SODIUM SERPL-SCNC: 132 MMOL/L (ref 136–145)
SP GR UR STRIP: 1.01 (ref 1–1.03)
SQUAMOUS EPITHELIAL: 1 /HPF
TRANSITIONAL EPI: <1 /HPF
UROBILINOGEN UR STRIP-MCNC: NORMAL MG/DL
WBC # BLD AUTO: 3.6 10E3/UL (ref 4–11)
WBC URINE: 0 /HPF

## 2022-07-09 PROCEDURE — 87637 SARSCOV2&INF A&B&RSV AMP PRB: CPT | Performed by: EMERGENCY MEDICINE

## 2022-07-09 PROCEDURE — 99223 1ST HOSP IP/OBS HIGH 75: CPT | Mod: AI | Performed by: HOSPITALIST

## 2022-07-09 PROCEDURE — 70450 CT HEAD/BRAIN W/O DYE: CPT | Mod: 26 | Performed by: RADIOLOGY

## 2022-07-09 PROCEDURE — 80053 COMPREHEN METABOLIC PANEL: CPT | Performed by: NURSE PRACTITIONER

## 2022-07-09 PROCEDURE — 99285 EMERGENCY DEPT VISIT HI MDM: CPT | Performed by: EMERGENCY MEDICINE

## 2022-07-09 PROCEDURE — 83690 ASSAY OF LIPASE: CPT | Performed by: NURSE PRACTITIONER

## 2022-07-09 PROCEDURE — 83036 HEMOGLOBIN GLYCOSYLATED A1C: CPT

## 2022-07-09 PROCEDURE — 70450 CT HEAD/BRAIN W/O DYE: CPT

## 2022-07-09 PROCEDURE — 85730 THROMBOPLASTIN TIME PARTIAL: CPT | Performed by: NURSE PRACTITIONER

## 2022-07-09 PROCEDURE — 71046 X-RAY EXAM CHEST 2 VIEWS: CPT

## 2022-07-09 PROCEDURE — 82010 KETONE BODYS QUAN: CPT | Performed by: NURSE PRACTITIONER

## 2022-07-09 PROCEDURE — 85610 PROTHROMBIN TIME: CPT | Performed by: NURSE PRACTITIONER

## 2022-07-09 PROCEDURE — 93975 VASCULAR STUDY: CPT

## 2022-07-09 PROCEDURE — 71046 X-RAY EXAM CHEST 2 VIEWS: CPT | Mod: 26 | Performed by: RADIOLOGY

## 2022-07-09 PROCEDURE — C9803 HOPD COVID-19 SPEC COLLECT: HCPCS | Performed by: EMERGENCY MEDICINE

## 2022-07-09 PROCEDURE — 120N000002 HC R&B MED SURG/OB UMMC

## 2022-07-09 PROCEDURE — 93975 VASCULAR STUDY: CPT | Mod: 26 | Performed by: RADIOLOGY

## 2022-07-09 PROCEDURE — 85025 COMPLETE CBC W/AUTO DIFF WBC: CPT | Performed by: NURSE PRACTITIONER

## 2022-07-09 PROCEDURE — 82140 ASSAY OF AMMONIA: CPT | Performed by: NURSE PRACTITIONER

## 2022-07-09 PROCEDURE — 81001 URINALYSIS AUTO W/SCOPE: CPT | Performed by: EMERGENCY MEDICINE

## 2022-07-09 PROCEDURE — 250N000011 HC RX IP 250 OP 636: Performed by: EMERGENCY MEDICINE

## 2022-07-09 PROCEDURE — 99285 EMERGENCY DEPT VISIT HI MDM: CPT | Mod: 25 | Performed by: EMERGENCY MEDICINE

## 2022-07-09 PROCEDURE — 250N000013 HC RX MED GY IP 250 OP 250 PS 637

## 2022-07-09 PROCEDURE — 258N000003 HC RX IP 258 OP 636: Performed by: NURSE PRACTITIONER

## 2022-07-09 PROCEDURE — 96360 HYDRATION IV INFUSION INIT: CPT | Performed by: EMERGENCY MEDICINE

## 2022-07-09 PROCEDURE — 250N000013 HC RX MED GY IP 250 OP 250 PS 637: Performed by: EMERGENCY MEDICINE

## 2022-07-09 RX ORDER — ONDANSETRON 8 MG/1
1 TABLET, ORALLY DISINTEGRATING ORAL EVERY 8 HOURS PRN
Status: ON HOLD | COMMUNITY
Start: 2022-07-06 | End: 2022-12-06

## 2022-07-09 RX ORDER — PROCHLORPERAZINE MALEATE 10 MG
10 TABLET ORAL EVERY 6 HOURS PRN
Status: ON HOLD | COMMUNITY
Start: 2021-11-08 | End: 2022-11-10

## 2022-07-09 RX ORDER — FAMOTIDINE 20 MG/1
20 TABLET, FILM COATED ORAL
Status: DISCONTINUED | OUTPATIENT
Start: 2022-07-10 | End: 2022-07-14 | Stop reason: HOSPADM

## 2022-07-09 RX ORDER — ONDANSETRON 2 MG/ML
4 INJECTION INTRAMUSCULAR; INTRAVENOUS EVERY 6 HOURS PRN
Status: DISCONTINUED | OUTPATIENT
Start: 2022-07-09 | End: 2022-07-14 | Stop reason: HOSPADM

## 2022-07-09 RX ORDER — ATORVASTATIN CALCIUM 20 MG/1
20 TABLET, FILM COATED ORAL EVERY EVENING
Status: DISCONTINUED | OUTPATIENT
Start: 2022-07-09 | End: 2022-07-14 | Stop reason: HOSPADM

## 2022-07-09 RX ORDER — SUMATRIPTAN 100 MG/1
.5-1 TABLET, FILM COATED ORAL PRN
COMMUNITY
Start: 2022-02-02 | End: 2022-09-15

## 2022-07-09 RX ORDER — HYDROXYZINE HYDROCHLORIDE 25 MG/1
25 TABLET, FILM COATED ORAL ONCE
Status: COMPLETED | OUTPATIENT
Start: 2022-07-09 | End: 2022-07-09

## 2022-07-09 RX ORDER — OMEPRAZOLE 20 MG/1
20 TABLET, DELAYED RELEASE ORAL DAILY
Status: DISCONTINUED | OUTPATIENT
Start: 2022-07-10 | End: 2022-07-09

## 2022-07-09 RX ORDER — AMOXICILLIN 250 MG
2 CAPSULE ORAL 2 TIMES DAILY
Status: DISCONTINUED | OUTPATIENT
Start: 2022-07-09 | End: 2022-07-14 | Stop reason: HOSPADM

## 2022-07-09 RX ORDER — LEVOTHYROXINE SODIUM 175 UG/1
175 TABLET ORAL DAILY
Status: DISCONTINUED | OUTPATIENT
Start: 2022-07-10 | End: 2022-07-10

## 2022-07-09 RX ORDER — DIAZEPAM 2 MG
1 TABLET ORAL PRN
Status: ON HOLD | COMMUNITY
Start: 2022-06-01 | End: 2022-07-14

## 2022-07-09 RX ORDER — FERROUS GLUCONATE 324(38)MG
324 TABLET ORAL DAILY
Status: DISCONTINUED | OUTPATIENT
Start: 2022-07-10 | End: 2022-07-14 | Stop reason: HOSPADM

## 2022-07-09 RX ORDER — ONDANSETRON 4 MG/1
4 TABLET, ORALLY DISINTEGRATING ORAL ONCE
Status: COMPLETED | OUTPATIENT
Start: 2022-07-09 | End: 2022-07-09

## 2022-07-09 RX ORDER — PANTOPRAZOLE SODIUM 40 MG/1
40 TABLET, DELAYED RELEASE ORAL
Status: DISCONTINUED | OUTPATIENT
Start: 2022-07-10 | End: 2022-07-14 | Stop reason: HOSPADM

## 2022-07-09 RX ORDER — LANOLIN ALCOHOL/MO/W.PET/CERES
1000 CREAM (GRAM) TOPICAL
Status: DISCONTINUED | OUTPATIENT
Start: 2022-07-11 | End: 2022-07-14 | Stop reason: HOSPADM

## 2022-07-09 RX ORDER — PREGABALIN 25 MG/1
25 CAPSULE ORAL DAILY
Status: DISCONTINUED | OUTPATIENT
Start: 2022-07-10 | End: 2022-07-14 | Stop reason: HOSPADM

## 2022-07-09 RX ORDER — HYDROXYZINE HYDROCHLORIDE 25 MG/1
25 TABLET, FILM COATED ORAL 3 TIMES DAILY PRN
Status: ON HOLD | COMMUNITY
End: 2022-07-14

## 2022-07-09 RX ORDER — LACTULOSE 10 G/15ML
20 SOLUTION ORAL ONCE
Status: COMPLETED | OUTPATIENT
Start: 2022-07-09 | End: 2022-07-09

## 2022-07-09 RX ORDER — ONDANSETRON 4 MG/1
4 TABLET, ORALLY DISINTEGRATING ORAL EVERY 6 HOURS PRN
Status: DISCONTINUED | OUTPATIENT
Start: 2022-07-09 | End: 2022-07-14 | Stop reason: HOSPADM

## 2022-07-09 RX ORDER — LIDOCAINE 40 MG/G
CREAM TOPICAL
Status: DISCONTINUED | OUTPATIENT
Start: 2022-07-09 | End: 2022-07-14 | Stop reason: HOSPADM

## 2022-07-09 RX ORDER — FERROUS GLUCONATE 324(38)MG
1 TABLET ORAL DAILY
COMMUNITY
Start: 2022-03-29 | End: 2022-07-10

## 2022-07-09 RX ORDER — AMOXICILLIN 250 MG
1 CAPSULE ORAL 2 TIMES DAILY
Status: DISCONTINUED | OUTPATIENT
Start: 2022-07-09 | End: 2022-07-14 | Stop reason: HOSPADM

## 2022-07-09 RX ORDER — LEVOTHYROXINE SODIUM 150 UG/1
125 TABLET ORAL DAILY
Status: ON HOLD | COMMUNITY
Start: 2022-05-23 | End: 2023-01-19

## 2022-07-09 RX ORDER — FOLIC ACID 1 MG/1
1 TABLET ORAL EVERY EVENING
Status: DISCONTINUED | OUTPATIENT
Start: 2022-07-09 | End: 2022-07-14 | Stop reason: HOSPADM

## 2022-07-09 RX ADMIN — SENNOSIDES AND DOCUSATE SODIUM 2 TABLET: 8.6; 5 TABLET ORAL at 23:31

## 2022-07-09 RX ADMIN — SODIUM CHLORIDE 1000 ML: 9 INJECTION, SOLUTION INTRAVENOUS at 15:51

## 2022-07-09 RX ADMIN — HYDROXYZINE HYDROCHLORIDE 25 MG: 25 TABLET, FILM COATED ORAL at 20:15

## 2022-07-09 RX ADMIN — FOLIC ACID 1 MG: 1 TABLET ORAL at 23:31

## 2022-07-09 RX ADMIN — LACTULOSE 20 G: 20 SOLUTION ORAL at 17:22

## 2022-07-09 RX ADMIN — ONDANSETRON 4 MG: 4 TABLET, ORALLY DISINTEGRATING ORAL at 19:37

## 2022-07-09 ASSESSMENT — ENCOUNTER SYMPTOMS
BACK PAIN: 0
SHORTNESS OF BREATH: 0
FEVER: 0
NAUSEA: 1
PALPITATIONS: 0
SORE THROAT: 0
FREQUENCY: 0
DYSURIA: 0
CHILLS: 0
HEADACHES: 1
ABDOMINAL PAIN: 1
COUGH: 0

## 2022-07-09 ASSESSMENT — ACTIVITIES OF DAILY LIVING (ADL)
ADLS_ACUITY_SCORE: 37
ADLS_ACUITY_SCORE: 37

## 2022-07-09 NOTE — ED TRIAGE NOTES
Pt presents ambulatory to ED reports of increasing in confusion, generalized weakness/fatigue x 1 week increasing in severity. Pt spouse reports pt forgetting the day of the week, or where phone is. PMHx CUETO, pt reports taking lactulose tid missing a few doses this weeks. Pt reports labile blood sugar both high/low x 1 week. Pt reports similar situations previously when ammonia levels are elevated. Pt AxOx4, RR e/u,     Triage Assessment     Row Name 07/09/22 1318       Triage Assessment (Adult)    Airway WDL WDL       Respiratory WDL    Respiratory WDL WDL       Skin Circulation/Temperature WDL    Skin Circulation/Temperature WDL WDL       Cardiac WDL    Cardiac WDL WDL       Peripheral/Neurovascular WDL    Peripheral Neurovascular WDL WDL    Capillary Refill, General less than/equal to 3 secs       Cognitive/Neuro/Behavioral WDL    Cognitive/Neuro/Behavioral WDL X;mood/behavior;orientation;speech  Pt and spouse reports mild confusion regarding date, increased fatigue and sleeping throughout the day    Level of Consciousness lethargic;confused    Arousal Level opens eyes spontaneously    Orientation oriented x 4    Speech clear;spontaneous    Mood/Behavior calm       Pupils (CN II)    Pupil PERRLA yes    Pupil Size Left 3 mm    Pupil Size Right 3 mm       [place] Coma Scale    Best Eye Response 4-->(E4) spontaneous    Best Motor Response 6-->(M6) obeys commands    Best Verbal Response 5-->(V5) oriented    [place] Coma Scale Score 15

## 2022-07-09 NOTE — ED PROVIDER NOTES
ED Provider Note  [facility]      History     Chief Complaint   Patient presents with     Altered Mental Status     The history is provided by the patient, the spouse and medical records.     Susan Puckett is a 51 year old female with history of CUETO cirrhosis w/ ascites s/p TIPS procedure on 11/05/2021, hepatic encephalopathy, pancreatic insufficiency, previous lap Faustino en Y, anemia, DVT on Eliquis, DMII presenting to the ED with altered mental status. Patient's  reports that confusion began 1 week ago and has been worsening since. She is increasingly forgetful and not making sense, not completing sentences. Patient agrees to feeling confused as well as increasingly lethargic. She also reports chills, nausea, abdominal distention, and decreased appetite. She did miss 2 doses of Lactulose in the last week. She continues to have 3-4 BM's daily which is normal for her. No abdominal pain or vomiting. She is still urinating. No dysuria. She notes that her BG has been up/down for the past week. She is on insulin, does not have a pump. She reports dry cough. No fever, chest pain, shortness of breath, or leg swelling.     Past Medical History  Past Medical History:   Diagnosis Date     Anemia      Anxiety      Cold sore      Depressive disorder      Diabetes (H)     Type 2 DM/No Insulin      Encephalopathy      Esophageal varices without bleeding (H)     grade I     History of blood transfusion     10/2019     History of seizure     diabetic seizure     Insomnia      Liver cirrhosis secondary to CUETO (H) 05/28/2020     Migraine      Pleural effusion      Thrombocytopenia (H)      Thyroid disease      Past Surgical History:   Procedure Laterality Date     APPENDECTOMY      1989     COLONOSCOPY      1/2020 at [facility]      ENT SURGERY  1998    tempanoplasty     GI SURGERY      EGD August 2020 at [facility]      GYN SURGERY  2010    laparoscopic ablation     IR TRANSVEN INTRAHEPATIC  PORTOSYST SHUNT  11/5/2021     MAMMOPLASTY REDUCTION BILATERAL  2004     MS STAB PHELBECTOMY VARICOSE VEINS, LESS THAN 10 INCISIONS, ONE EXTREMITY  2020     RNY gastric bypass  01/2006    retoocolic, retrogastric, [facility]     TONSILLECTOMY & ADENOIDECTOMY Bilateral 1978     WISDOM TEETH EXTRACTION       acetaminophen (TYLENOL) 500 MG tablet  amylase-lipase-protease (CREON 24) 89902-37683 units CPEP per EC capsule  amylase-lipase-protease (CREON 24) 71955-84576 units CPEP per EC capsule  apixaban ANTICOAGULANT (ELIQUIS) 5 MG tablet  atorvastatin (LIPITOR) 20 MG tablet  calcium carbonate-vitamin D (OSCAL W/D) 500-200 MG-UNIT tablet  CALCIUM CITRATE PO  Continuous Blood Gluc  (DEXCOM G6 ) LUNA  Continuous Blood Gluc Sensor (DEXCOM G6 SENSOR) MISC  Continuous Blood Gluc Transmit (DEXCOM G6 TRANSMITTER) MISC  cyanocobalamin (VITAMIN B-12) 1000 MCG tablet  escitalopram (LEXAPRO) 10 MG tablet  famotidine (PEPCID) 20 MG tablet  Ferrous Gluconate 324 (37.5 Fe) MG TABS  folic acid (FOLVITE) 1 MG tablet  furosemide (LASIX) 40 MG tablet  insulin aspart (NOVOLOG FLEXPEN) 100 UNIT/ML pen  insulin glargine (LANTUS PEN) 100 UNIT/ML pen  insulin pen needle (BD GRISEL U/F) 32G X 4 MM miscellaneous  lactulose (CHRONULAC) 10 GM/15ML solution  levothyroxine (SYNTHROID/LEVOTHROID) 175 MCG tablet  melatonin 5 MG tablet  multivitamin CF FORMULA (DEKAS PLUS) capsule  omeprazole (PRILOSEC OTC) 20 MG EC tablet  ondansetron (ZOFRAN-ODT) 4 MG ODT tab  potassium chloride ER (K-TAB) 20 MEQ CR tablet  pregabalin (LYRICA) 25 MG capsule  rizatriptan (MAXALT) 10 MG tablet  sitagliptin (JANUVIA) 100 MG tablet  spironolactone (ALDACTONE) 50 MG tablet  topiramate (TOPAMAX) 50 MG tablet  traZODone (DESYREL) 100 MG tablet  valACYclovir (VALTREX) 1000 mg tablet  vitamin A 3 MG (22919 UNITS) capsule  vitamin C (ASCORBIC ACID) 1000 MG TABS  vitamin D3 (CHOLECALCIFEROL) 50 mcg (2000 units) tablet  vitamin E (TOCOPHEROL) 400 units (180 mg)  "capsule  XIFAXAN 550 MG TABS tablet      Allergies   Allergen Reactions     Methylprednisolone Hives     Droperidol Anxiety     Nsaids Other (See Comments)     GI bleed.     Prednisone Anxiety, Hives and Rash     Family History  Family History   Problem Relation Age of Onset     Anesthesia Reaction Nephew         PONV     Bleeding Disorder No family hx of      Clotting Disorder No family hx of      Social History   Social History     Tobacco Use     Smoking status: Never Smoker     Smokeless tobacco: Never Used   Substance Use Topics     Alcohol use: Not Currently     Comment: Last drink was in 2017     Drug use: Never      Past medical history, past surgical history, medications, allergies, family history, and social history were reviewed with the patient. No additional pertinent items.       Review of Systems  A complete review of systems was performed with pertinent positives and negatives noted in the HPI, and all other systems negative.    Physical Exam   BP: 116/74  Pulse: 73  Temp: 97.7  F (36.5  C)  Resp: 20  Height: 167.6 cm (5' 6\")  Weight: 72.6 kg (160 lb)  SpO2: 100 %  Physical Exam  Constitutional:       General: She is not in acute distress.     Appearance: She is well-developed.   HENT:      Head: Normocephalic and atraumatic.      Mouth/Throat:      Mouth: Mucous membranes are moist.   Eyes:      Extraocular Movements: Extraocular movements intact.      Pupils: Pupils are equal, round, and reactive to light.   Cardiovascular:      Rate and Rhythm: Normal rate and regular rhythm.      Heart sounds: Normal heart sounds.   Pulmonary:      Effort: Pulmonary effort is normal. No respiratory distress.      Breath sounds: No wheezing, rhonchi or rales.   Abdominal:      Palpations: Abdomen is soft.      Tenderness: There is no abdominal tenderness. There is no guarding.   Musculoskeletal:         General: No swelling or tenderness. Normal range of motion.      Cervical back: Normal range of motion and neck " supple.   Skin:     General: Skin is warm.   Neurological:      Mental Status: She is alert.      Cranial Nerves: No cranial nerve deficit.      Comments: A+Ox2    Moving all extremities spontaneously    Intermittently drifting off to sleep       ED Course      Procedures          Results for orders placed or performed during the hospital encounter of 07/09/22   US Abdomen Complete with TIPSS Doppler     Status: None (Preliminary result)    Impression    RESIDENT PRELIMINARY INTERPRETATION  Impression:   1. Patent TIPS. Stable velocities through the TIPS.  2. Cirrhotic morphology of the liver.  3. No free fluid in the right lower quadrant or left lower quadrant.     Chest XR,  PA & LAT     Status: None    Narrative    EXAM: XR CHEST 2 VW 7/9/2022 5:01 PM    HISTORY: cough.    COMPARISON: Radiograph of the chest dated 12/26/2021.    TECHNIQUE: Upright frontal and lateral views of the chest.    FINDINGS: Trachea is midline. Cardiac and mediastinal silhouette is  within normal limits. No focal pulmonary opacities. No pleural  effusions. No pneumothoraces. Osteopenic appearance of the bones. No  acute osseous abnormalities. Partially visualized air distended colon.      Impression    IMPRESSION: No focal pulmonary opacities    I have personally reviewed the examination and initial interpretation  and I agree with the findings.    DANNY TRIPP MD         SYSTEM ID:  [id number]   UA with Microscopic reflex to Culture     Status: Abnormal    Specimen: Urine, Midstream   Result Value Ref Range    Color Urine Straw Colorless, Straw, Light Yellow, Yellow    Appearance Urine Clear Clear    Glucose Urine >=1000 (A) Negative mg/dL    Bilirubin Urine Negative Negative    Ketones Urine Negative Negative mg/dL    Specific Gravity Urine 1.011 1.003 - 1.035    Blood Urine Negative Negative    pH Urine 5.0 5.0 - 7.0    Protein Albumin Urine Negative Negative mg/dL    Urobilinogen Urine Normal Normal, 2.0 mg/dL    Nitrite Urine  Negative Negative    Leukocyte Esterase Urine Negative Negative    RBC Urine 0 <=2 /HPF    WBC Urine 0 <=5 /HPF    Squamous Epithelials Urine 1 <=1 /HPF    Transitional Epithelials Urine <1 <=1 /HPF    Narrative    Urine Culture not indicated   INR     Status: Abnormal   Result Value Ref Range    INR 1.49 (H) 0.85 - 1.15   Partial thromboplastin time     Status: Normal   Result Value Ref Range    aPTT 38 22 - 38 Seconds   Comprehensive metabolic panel     Status: Abnormal   Result Value Ref Range    Sodium 132 (L) 136 - 145 mmol/L    Potassium 3.9 3.4 - 5.3 mmol/L    Creatinine 0.97 (H) 0.51 - 0.95 mg/dL    Urea Nitrogen 12.2 6.0 - 20.0 mg/dL    Chloride 101 98 - 107 mmol/L    Carbon Dioxide (CO2) 22 22 - 29 mmol/L    Anion Gap 9 7 - 15 mmol/L    Glucose 513 (HH) 70 - 99 mg/dL    Calcium 8.7 8.6 - 10.0 mg/dL    Protein Total 6.4 6.4 - 8.3 g/dL    Albumin 3.0 (L) 3.5 - 5.2 g/dL    Bilirubin Total 2.1 (H) <=1.2 mg/dL    Alkaline Phosphatase 132 (H) 35 - 104 U/L    AST 40 (H) 10 - 35 U/L    ALT 14 10 - 35 U/L    GFR Estimate 71 >60 mL/min/1.73m2   Lipase     Status: Abnormal   Result Value Ref Range    Lipase 6 (L) 13 - 60 U/L   Ammonia     Status: Abnormal   Result Value Ref Range    Ammonia 56 (H) 11 - 51 umol/L   Ketone Beta-Hydroxybutyrate Quantitative     Status: Normal   Result Value Ref Range    Ketone (Beta-Hydroxybutyrate) Quantitative 0.2 0.0 - 0.6 mmol/L   Glucose by meter     Status: Abnormal   Result Value Ref Range    GLUCOSE BY METER POCT >600 (HH) 70 - 99 mg/dL   CBC with platelets and differential     Status: Abnormal   Result Value Ref Range    WBC Count 3.6 (L) 4.0 - 11.0 10e3/uL    RBC Count 3.07 (L) 3.80 - 5.20 10e6/uL    Hemoglobin 10.4 (L) 11.7 - 15.7 g/dL    Hematocrit 31.9 (L) 35.0 - 47.0 %     (H) 78 - 100 fL    MCH 33.9 (H) 26.5 - 33.0 pg    MCHC 32.6 31.5 - 36.5 g/dL    RDW 14.8 10.0 - 15.0 %    Platelet Count 99 (L) 150 - 450 10e3/uL    % Neutrophils 64 %    % Lymphocytes 22 %    %  Monocytes 9 %    % Eosinophils 4 %    % Basophils 1 %    % Immature Granulocytes 0 %    NRBCs per 100 WBC 0 <1 /100    Absolute Neutrophils 2.3 1.6 - 8.3 10e3/uL    Absolute Lymphocytes 0.8 0.8 - 5.3 10e3/uL    Absolute Monocytes 0.3 0.0 - 1.3 10e3/uL    Absolute Eosinophils 0.1 0.0 - 0.7 10e3/uL    Absolute Basophils 0.0 0.0 - 0.2 10e3/uL    Absolute Immature Granulocytes 0.0 <=0.4 10e3/uL    Absolute NRBCs 0.0 10e3/uL   Glucose by meter     Status: Abnormal   Result Value Ref Range    GLUCOSE BY METER POCT 508 (HH) 70 - 99 mg/dL   Symptomatic; Unknown Influenza A/B & SARS-CoV2 (COVID-19) Virus PCR Multiplex Nasopharyngeal     Status: Normal    Specimen: Nasopharyngeal; Swab   Result Value Ref Range    Influenza A PCR Negative Negative    Influenza B PCR Negative Negative    RSV PCR Negative Negative    SARS CoV2 PCR Negative Negative, Testing sent to reference lab. Results will be returned via unsolicited result    Narrative    Testing was performed using the Xpert Xpress CoV2/Flu/RSV Assay on the Cepheid GeneXpert Instrument. This test should be ordered for the detection of SARS-CoV-2 and influenza viruses in individuals who meet clinical and/or epidemiological criteria. Test performance is unknown in asymptomatic patients. This test is for in vitro diagnostic use under the FDA EUA for laboratories certified under CLIA to perform high or moderate complexity testing. This test has not been FDA cleared or approved. A negative result does not rule out the presence of PCR inhibitors in the specimen or target RNA in concentration below the limit of detection for the assay. If only one viral target is positive but coinfection with multiple targets is suspected, the sample should be re-tested with another FDA cleared, approved, or authorized test, if coinfection would change clinical management. This test was validated by the [facility]. These laboratories are certified under the Clinical  Laboratory  Improvement Amendments of 1988 (CLIA-88) as qualified to perform high complexity laboratory testing.   Extra Tube     Status: None    Narrative    The following orders were created for panel order Extra Tube.  Procedure                               Abnormality         Status                     ---------                               -----------         ------                     Extra Red Top Tube[[id number]]                               Final result                 Please view results for these tests on the individual orders.   Extra Red Top Tube     Status: None   Result Value Ref Range    Hold Specimen JIC    Glucose by meter     Status: Abnormal   Result Value Ref Range    GLUCOSE BY METER POCT 143 (H) 70 - 99 mg/dL   CBC with platelets differential     Status: Abnormal    Narrative    The following orders were created for panel order CBC with platelets differential.  Procedure                               Abnormality         Status                     ---------                               -----------         ------                     CBC with platelets and d...[[id number]]  Abnormal            Final result                 Please view results for these tests on the individual orders.     Medications   0.9% sodium chloride BOLUS (0 mLs Intravenous Stopped 7/9/22 1721)   lactulose (CHRONULAC) solution 20 g (20 g Oral Given 7/9/22 1722)        Assessments & Plan (with Medical Decision Making)   Patient nontoxic in no acute distress presents for worsening confusion and hyperglycemia.  Here she is alert and oriented x2, she is intermittently drifting off to sleep but arousable.  Suspect given her history that she could have worsening hepatic encephalopathy.  Her glucose on fingerstick however was also 500.  We will need to rule out DKA as well.  She was given fluids and laboratory work was obtained, along with imaging for chest x-ray and urinalysis to see if there is any inciting event to lead to this.  UA was  negative for infection chest x-ray is clear.  Labs were consistent with slight changes in electrolytes due to hyperglycemia.  Her LFTs are otherwise stable.  Her ammonia level is not significantly elevated at 56.  Ketones were negative.  On recheck her glucose has decreased to 140 and this was without insulin administration.  Later this dropped even further into the 60s.  She was given juice.  Unclear why her glucose is so labile.  In regards to hepatic encephalopathy her labs appear at baseline and ammonia is not significantly elevated.  Question if her confusion and lethargy could be related to her waxing waning glucose levels along with her longstanding liver disease.  I discussed the results with the patient and .  He is very concerned about possibly being discharged in her worsening and having to come back.  Patient and  also denies any falls.  I discussed with the patient and family that I will ask medicine for admission for further monitoring for her altered mental status and her labile sugars.  I will obtain a head CT which evening physician will follow up but lower suspicion for any acute process especially without any trauma.  Medicine agreed for admission.  Plan is for medical admission pending negative head CT.    I have reviewed the nursing notes. I have reviewed the findings, diagnosis, plan and need for follow up with the patient.    New Prescriptions    No medications on file       Final diagnoses:   Hyperglycemia   Confusion   Liver cirrhosis secondary to CUETO (H)   I, Hiral Rosenberg, am serving as a trained medical scribe to document services personally performed by Jerald Mejias MD, based on the provider's statements to me.     IJerald MD, was physically present and have reviewed and verified the accuracy of this note documented by Hiral Rosenberg.      --  Jerald Mejias MD  [facility] EMERGENCY DEPARTMENT  7/9/2022     Jerald Mejias MD  07/09/22 2003

## 2022-07-09 NOTE — ED PROVIDER NOTES
"ED Triage Provider Note  [facility]  Encounter Date: Jul 9, 2022    History:  Chief Complaint   Patient presents with     Altered Mental Status     Susan Puckett is a 51 year old female with past medical history of diabetes, cirrhosis due to CUETO, liver failure, seizure disorder, thyroid disorder who presents to the ED with with her  over 1 weeks worth of fatigue, and increasing confusion.    Patient's  reports over the past week he has noticed increasing confusion, such as being forgetful and forgetting things she had done earlier in the day.  For today she was confused about the day of the week thinking it was Friday.  Also, while in triage room patient forgot she had recently eaten.  She also reports 1 week of feeling very tired, and having a headache.  She reports decreased appetite, and some abdominal discomfort.    She also reports 1 week of labile blood sugars, mostly elevated blood sugars.  Patient has a Dexcom device and blood sugar today has been reading in the 500s.  Fingerstick in triage greater than 500.    Review of Systems:  Review of Systems   Constitutional: Negative for chills and fever.   HENT: Negative for sore throat.    Respiratory: Negative for cough and shortness of breath.    Cardiovascular: Negative for chest pain and palpitations.   Gastrointestinal: Positive for abdominal pain and nausea.   Genitourinary: Negative for dysuria and frequency.   Musculoskeletal: Negative for back pain.   Neurological: Positive for headaches.       Exam:  /74   Pulse 73   Temp 97.7  F (36.5  C) (Oral)   Resp 20   Ht 1.676 m (5' 6\")   Wt 72.6 kg (160 lb)   SpO2 100%   BMI 25.82 kg/m    Physical Exam  Vitals and nursing note reviewed.   HENT:      Head: Normocephalic.   Eyes:      General: No scleral icterus.     Extraocular Movements: Extraocular movements intact.   Cardiovascular:      Rate and Rhythm: Normal rate and regular rhythm.     "  Heart sounds: Normal heart sounds.   Pulmonary:      Effort: Pulmonary effort is normal.      Breath sounds: Normal breath sounds.   Abdominal:      General: There is no distension.      Palpations: Abdomen is soft.      Tenderness: There is no abdominal tenderness.   Musculoskeletal:         General: Normal range of motion.      Cervical back: Normal range of motion and neck supple.   Skin:     General: Skin is warm.   Neurological:      Mental Status: She is alert.           Medical Decision Making:  Patient arriving to the ED with problem as above. A medical screening exam was performed. Lab orders, and TIPS ultraouns initiated from Triage. The patient is appropriate to be immediately roomed.      CAIN Ibarra CNP on 7/9/2022 at 3:55 PM        Calixto Alcaraz APRN CNP  07/09/22 1600

## 2022-07-10 LAB
ALBUMIN SERPL BCG-MCNC: 2.4 G/DL (ref 3.5–5.2)
ALP SERPL-CCNC: 109 U/L (ref 35–104)
ALT SERPL W P-5'-P-CCNC: 11 U/L (ref 10–35)
ANION GAP SERPL CALCULATED.3IONS-SCNC: 6 MMOL/L (ref 7–15)
AST SERPL W P-5'-P-CCNC: 36 U/L (ref 10–35)
ATRIAL RATE - MUSE: 69 BPM
BILIRUB SERPL-MCNC: 2 MG/DL
BUN SERPL-MCNC: 10.2 MG/DL (ref 6–20)
CALCIUM SERPL-MCNC: 8.1 MG/DL (ref 8.6–10)
CHLORIDE SERPL-SCNC: 106 MMOL/L (ref 98–107)
CREAT SERPL-MCNC: 0.83 MG/DL (ref 0.51–0.95)
DEPRECATED HCO3 PLAS-SCNC: 21 MMOL/L (ref 22–29)
DIASTOLIC BLOOD PRESSURE - MUSE: NORMAL MMHG
ERYTHROCYTE [DISTWIDTH] IN BLOOD BY AUTOMATED COUNT: 15 % (ref 10–15)
GFR SERPL CREATININE-BSD FRML MDRD: 86 ML/MIN/1.73M2
GLUCOSE BLDC GLUCOMTR-MCNC: 196 MG/DL (ref 70–99)
GLUCOSE BLDC GLUCOMTR-MCNC: 212 MG/DL (ref 70–99)
GLUCOSE BLDC GLUCOMTR-MCNC: 225 MG/DL (ref 70–99)
GLUCOSE BLDC GLUCOMTR-MCNC: 239 MG/DL (ref 70–99)
GLUCOSE BLDC GLUCOMTR-MCNC: 353 MG/DL (ref 70–99)
GLUCOSE SERPL-MCNC: 223 MG/DL (ref 70–99)
HBA1C MFR BLD: 7.3 %
HCT VFR BLD AUTO: 28.1 % (ref 35–47)
HGB BLD-MCNC: 9.2 G/DL (ref 11.7–15.7)
INTERPRETATION ECG - MUSE: NORMAL
MCH RBC QN AUTO: 33.6 PG (ref 26.5–33)
MCHC RBC AUTO-ENTMCNC: 32.7 G/DL (ref 31.5–36.5)
MCV RBC AUTO: 103 FL (ref 78–100)
P AXIS - MUSE: 52 DEGREES
PLATELET # BLD AUTO: 80 10E3/UL (ref 150–450)
POTASSIUM SERPL-SCNC: 3.8 MMOL/L (ref 3.4–5.3)
PR INTERVAL - MUSE: 150 MS
PROT SERPL-MCNC: 5.1 G/DL (ref 6.4–8.3)
QRS DURATION - MUSE: 78 MS
QT - MUSE: 448 MS
QTC - MUSE: 480 MS
R AXIS - MUSE: -17 DEGREES
RBC # BLD AUTO: 2.74 10E6/UL (ref 3.8–5.2)
SODIUM SERPL-SCNC: 133 MMOL/L (ref 136–145)
SYSTOLIC BLOOD PRESSURE - MUSE: NORMAL MMHG
T AXIS - MUSE: 10 DEGREES
VENTRICULAR RATE- MUSE: 69 BPM
WBC # BLD AUTO: 3.8 10E3/UL (ref 4–11)

## 2022-07-10 PROCEDURE — 120N000002 HC R&B MED SURG/OB UMMC

## 2022-07-10 PROCEDURE — 250N000013 HC RX MED GY IP 250 OP 250 PS 637

## 2022-07-10 PROCEDURE — 250N000011 HC RX IP 250 OP 636

## 2022-07-10 PROCEDURE — 80053 COMPREHEN METABOLIC PANEL: CPT

## 2022-07-10 PROCEDURE — 250N000013 HC RX MED GY IP 250 OP 250 PS 637: Performed by: STUDENT IN AN ORGANIZED HEALTH CARE EDUCATION/TRAINING PROGRAM

## 2022-07-10 PROCEDURE — 999N000248 HC STATISTIC IV INSERT WITH US BY RN

## 2022-07-10 PROCEDURE — 85027 COMPLETE CBC AUTOMATED: CPT

## 2022-07-10 PROCEDURE — 99233 SBSQ HOSP IP/OBS HIGH 50: CPT | Performed by: STUDENT IN AN ORGANIZED HEALTH CARE EDUCATION/TRAINING PROGRAM

## 2022-07-10 PROCEDURE — 250N000012 HC RX MED GY IP 250 OP 636 PS 637

## 2022-07-10 PROCEDURE — 36415 COLL VENOUS BLD VENIPUNCTURE: CPT

## 2022-07-10 RX ORDER — LEVOTHYROXINE SODIUM 200 UG/1
200 TABLET ORAL DAILY
Status: DISCONTINUED | OUTPATIENT
Start: 2022-07-11 | End: 2022-07-10

## 2022-07-10 RX ORDER — LEVOTHYROXINE SODIUM 100 UG/1
200 TABLET ORAL DAILY
Status: DISCONTINUED | OUTPATIENT
Start: 2022-07-10 | End: 2022-07-14 | Stop reason: HOSPADM

## 2022-07-10 RX ORDER — ESCITALOPRAM OXALATE 10 MG/1
10 TABLET ORAL DAILY
Status: DISCONTINUED | OUTPATIENT
Start: 2022-07-10 | End: 2022-07-14 | Stop reason: HOSPADM

## 2022-07-10 RX ORDER — SPIRONOLACTONE 25 MG/1
100 TABLET ORAL DAILY
Status: DISCONTINUED | OUTPATIENT
Start: 2022-07-10 | End: 2022-07-14 | Stop reason: HOSPADM

## 2022-07-10 RX ORDER — ACETAMINOPHEN 325 MG/1
325 TABLET ORAL EVERY 4 HOURS PRN
Status: DISCONTINUED | OUTPATIENT
Start: 2022-07-10 | End: 2022-07-14 | Stop reason: HOSPADM

## 2022-07-10 RX ORDER — FUROSEMIDE 20 MG
40 TABLET ORAL DAILY
Status: DISCONTINUED | OUTPATIENT
Start: 2022-07-10 | End: 2022-07-14 | Stop reason: HOSPADM

## 2022-07-10 RX ORDER — LACTULOSE 10 G/15ML
30 SOLUTION ORAL 3 TIMES DAILY
Status: DISCONTINUED | OUTPATIENT
Start: 2022-07-10 | End: 2022-07-14 | Stop reason: HOSPADM

## 2022-07-10 RX ORDER — DEXTROSE MONOHYDRATE 25 G/50ML
25-50 INJECTION, SOLUTION INTRAVENOUS
Status: DISCONTINUED | OUTPATIENT
Start: 2022-07-10 | End: 2022-07-14 | Stop reason: HOSPADM

## 2022-07-10 RX ORDER — NICOTINE POLACRILEX 4 MG
15-30 LOZENGE BUCCAL
Status: DISCONTINUED | OUTPATIENT
Start: 2022-07-10 | End: 2022-07-14 | Stop reason: HOSPADM

## 2022-07-10 RX ADMIN — PREGABALIN 25 MG: 25 CAPSULE ORAL at 09:08

## 2022-07-10 RX ADMIN — ATORVASTATIN CALCIUM 20 MG: 20 TABLET, FILM COATED ORAL at 01:34

## 2022-07-10 RX ADMIN — ATORVASTATIN CALCIUM 20 MG: 20 TABLET, FILM COATED ORAL at 19:47

## 2022-07-10 RX ADMIN — INSULIN ASPART 5 UNITS: 100 INJECTION, SOLUTION INTRAVENOUS; SUBCUTANEOUS at 17:26

## 2022-07-10 RX ADMIN — LACTULOSE 30 G: 20 SOLUTION ORAL at 19:48

## 2022-07-10 RX ADMIN — FUROSEMIDE 40 MG: 20 TABLET ORAL at 09:09

## 2022-07-10 RX ADMIN — ONDANSETRON 4 MG: 4 TABLET, ORALLY DISINTEGRATING ORAL at 13:55

## 2022-07-10 RX ADMIN — SPIRONOLACTONE 100 MG: 100 TABLET ORAL at 09:09

## 2022-07-10 RX ADMIN — PANCRELIPASE 2 CAPSULE: 24000; 76000; 120000 CAPSULE, DELAYED RELEASE PELLETS ORAL at 17:29

## 2022-07-10 RX ADMIN — ACETAMINOPHEN 325 MG: 325 TABLET, FILM COATED ORAL at 04:15

## 2022-07-10 RX ADMIN — ONDANSETRON 4 MG: 4 TABLET, ORALLY DISINTEGRATING ORAL at 05:46

## 2022-07-10 RX ADMIN — RIFAXIMIN 550 MG: 550 TABLET ORAL at 09:09

## 2022-07-10 RX ADMIN — FAMOTIDINE 20 MG: 20 TABLET ORAL at 09:07

## 2022-07-10 RX ADMIN — APIXABAN 5 MG: 5 TABLET, FILM COATED ORAL at 01:34

## 2022-07-10 RX ADMIN — PANCRELIPASE 2 CAPSULE: 24000; 76000; 120000 CAPSULE, DELAYED RELEASE PELLETS ORAL at 09:07

## 2022-07-10 RX ADMIN — FOLIC ACID 1 MG: 1 TABLET ORAL at 19:47

## 2022-07-10 RX ADMIN — PANTOPRAZOLE SODIUM 40 MG: 40 TABLET, DELAYED RELEASE ORAL at 09:08

## 2022-07-10 RX ADMIN — LACTULOSE 30 G: 20 SOLUTION ORAL at 13:29

## 2022-07-10 RX ADMIN — Medication 1 TABLET: at 09:10

## 2022-07-10 RX ADMIN — RIFAXIMIN 550 MG: 550 TABLET ORAL at 19:47

## 2022-07-10 RX ADMIN — FERROUS GLUCONATE 324 MG: 324 TABLET ORAL at 09:11

## 2022-07-10 RX ADMIN — INSULIN ASPART 2 UNITS: 100 INJECTION, SOLUTION INTRAVENOUS; SUBCUTANEOUS at 12:52

## 2022-07-10 RX ADMIN — ESCITALOPRAM OXALATE 10 MG: 10 TABLET ORAL at 09:22

## 2022-07-10 RX ADMIN — PANCRELIPASE 2 CAPSULE: 24000; 76000; 120000 CAPSULE, DELAYED RELEASE PELLETS ORAL at 12:53

## 2022-07-10 RX ADMIN — INSULIN GLARGINE 6 UNITS: 100 INJECTION, SOLUTION SUBCUTANEOUS at 09:12

## 2022-07-10 RX ADMIN — Medication 1 TABLET: at 17:29

## 2022-07-10 ASSESSMENT — ACTIVITIES OF DAILY LIVING (ADL)
WALKING_OR_CLIMBING_STAIRS: AMBULATION DIFFICULTY, ASSISTANCE 1 PERSON;STAIR CLIMBING DIFFICULTY, ASSISTANCE 1 PERSON;TRANSFERRING DIFFICULTY, ASSISTANCE 1 PERSON
ADLS_ACUITY_SCORE: 39
TOILETING: 1-->ASSISTANCE (EQUIPMENT/PERSON) NEEDED
CHANGE_IN_FUNCTIONAL_STATUS_SINCE_ONSET_OF_CURRENT_ILLNESS/INJURY: YES
TRANSFERRING: 1-->ASSISTANCE (EQUIPMENT/PERSON) NEEDED
DRESSING/BATHING_MANAGEMENT: YES
FALL_HISTORY_WITHIN_LAST_SIX_MONTHS: NO
TOILETING_ISSUES: YES
DRESS: 1-->ASSISTANCE (EQUIPMENT/PERSON) NEEDED (NOT DEVELOPMENTALLY APPROPRIATE)
WEAR_GLASSES_OR_BLIND: YES
ADLS_ACUITY_SCORE: 37
ADLS_ACUITY_SCORE: 37
ADLS_ACUITY_SCORE: 48
ADLS_ACUITY_SCORE: 45
ADLS_ACUITY_SCORE: 52
CONCENTRATING,_REMEMBERING_OR_MAKING_DECISIONS_DIFFICULTY: YES
ADLS_ACUITY_SCORE: 39
TOILETING_ASSISTANCE: TOILETING DIFFICULTY, ASSISTANCE 1 PERSON
TRANSFERRING: 0-->ASSISTANCE NEEDED (DEVELOPMENTALLY APPROPRIATE)
DRESS: 1-->ASSISTANCE (EQUIPMENT/PERSON) NEEDED
ADLS_ACUITY_SCORE: 37
DOING_ERRANDS_INDEPENDENTLY_DIFFICULTY: YES
VISION_MANAGEMENT: GLASSES
ADLS_ACUITY_SCORE: 39
DIFFICULTY_EATING/SWALLOWING: NO
ADLS_ACUITY_SCORE: 48
ADLS_ACUITY_SCORE: 52
ADLS_ACUITY_SCORE: 52
DRESSING/BATHING_DIFFICULTY: YES
DRESSING/BATHING: BATHING DIFFICULTY, ASSISTANCE 1 PERSON;DRESSING DIFFICULTY, ASSISTANCE 1 PERSON
WALKING_OR_CLIMBING_STAIRS_DIFFICULTY: YES
TOILETING: 0-->NOT TOILET TRAINED OR ASSISTANCE NEEDED (DEVELOPMENTALLY APPROPRIATE)
BATHING: 1-->ASSISTANCE NEEDED

## 2022-07-10 NOTE — PROGRESS NOTES
"Shift: [id number]    Pain: complains of headache 8/10 - gave Tylenol 350mg   Neuro: AOx4, neurologically intact  Cardiac: HR and BP WNL  Respiratory: clear breath sounds; on room air  Diet/Appetite:  Combination regular diet  /GI: continent bowel and bladder; complains of nausea - gave Zofran tablet  LDA's: PIV on left lower arm  Activity: ambulatory - on stand-by assist; generalized body weakness - motor strength on upper and lower extremities at level 3.        VS: /77 (BP Location: Right arm)   Pulse 71   Temp 97.8  F (36.6  C) (Oral)   Resp 18   Ht 1.676 m (5' 6\")   Wt 72.6 kg (160 lb)   SpO2 99%   BMI 25.82 kg/m      "

## 2022-07-10 NOTE — TELEPHONE ENCOUNTER
"Rivera, Susan's  paged, to state they were in the emergency room for ongoing confusion, and unmanageable blood sugars.  He reports that he is upset because he feels he is not being heard and not getting answers to Susan's confusion.  He reports they will be admitting Susan for further monitoring but \"they say all her numbers are ok and that does not explain the confusion.  They will send us home and I will have to call the EMT's again and we do this all over again.\"    Rivera advised that they are in the emergency room, and as on call triage, writer would direct them to the emergency room for further evaluation.  Per chart review, it appears she will be admitted for further monitoring of mental status and liable sugars. Rivera advised to continue to talk with the team caring for Susan and a message would be sent to primary RNCC to check in with them Monday. He can call with further questions or concerns.  "

## 2022-07-10 NOTE — PROVIDER NOTIFICATION
Text paged to med idalia night,      Pt complaining of 8/10 headache. Can I have PRN Tylenol? thanks DAVID Roblero RN [id number]

## 2022-07-10 NOTE — PROGRESS NOTES
[facility]    Medicine Progress Note - Hospitalist Service, GOLD TEAM 9    Date of Admission:  7/9/2022    Assessment & Plan        Altered mental status  Hepatic encephalopathy, likely  Presents with one week of confusion after missing some doses of lactulose. Workup on admission notable for CT head that is unremarkable, mild hyponatremia at 132, ammonia 56, glucose 513 -> 223, stable CBC. No clear e/o infection. Mental status this morning perhaps marginally improved with lactulose administration. Patient denied alcohol use. Abd US with patent TIPS, no ascites. Treat for hepatic encephalopathy and see if she clears over next 24-48h.  -Continue lactulose, titrate to 2-3 bowel movements.   -Daily CMP, CBC.   -Treat hyperglycemia; improved now with insulin administration.   -Monitor for infection, watch culture data.   -Continue diuretics, monitor renal function daily.     Chronic  T2DM, hyperglycemia: continue basal and bolus insulin. Unclear if hyperglycemia on admission spurious or real. Mild metabolic acidosis this morning but overall low suspicion for DKA. Will obtain daily BMP.  Anemia: Daily CBC.   H/o DVT in arm associated with PICC line: patient not taking apixaban anymore per pharmacy, seems to have completed 3 months treatment earlier this year. No new s/s in arm.  HLD: continue statin.  GERD: continue PPI.  Neuropathy: continue pregabalin.    Hypothyroidism: continue synthroid.      Diet: Combination Diet Regular Diet Adult    DVT Prophylaxis: Enoxaparin  Rossi Catheter: Not present  Central Lines: None  Cardiac Monitoring: None  Code Status: Full Code        The patient's care was discussed with the Bedside Nurse and Patient.    Joe Lin MD  Hospitalist Service, GOLD TEAM 9  [facility]  Securely message with the Vocera Web Console (learn more here)  Text page via [employer] Paging/Directory   Please see signed in  "provider for up to date coverage information      Clinically Significant Risk Factors Present on Admission             # Hypoalbuminemia: Albumin = 2.4 g/dL (Ref range: 3.5 - 5.2 g/dL) on admission, will monitor as appropriate   # Coagulation Defect: home medication list includes an anticoagulant medication  # Thrombocytopenia: Plts = 80 10e3/uL (Ref range: 150 - 450 10e3/uL) on admission, will monitor for bleeding     # Hypertension: home medication list includes antihypertensive(s)  # Overweight: Estimated body mass index is 25.82 kg/m  as calculated from the following:    Height as of this encounter: 1.676 m (5' 6\").    Weight as of this encounter: 72.6 kg (160 lb).        ______________________________________________________________________    Interval History   Still feels confused this morning - tough to say exactly why per patient  Has a headache this morning  No abd pain but feels a little tight there  No chest pain or shortness of breath  Has been having bowel movements since being here - last bowel movement was yesterday and was loose  4 point ROS negative    Data reviewed today: I reviewed all medications, new labs and imaging results over the last 24 hours. I personally reviewed no images or EKG's today.    Physical Exam   Vital Signs: Temp: 97.8  F (36.6  C) Temp src: Oral BP: 116/77 Pulse: 71   Resp: 18 SpO2: 99 % O2 Device: None (Room air)    Weight: 160 lbs 0 oz  Exam  Gen: awake and alert, appears comfortable, appears stated age  HEENT: NCAT, sclerae anicteric  CV: RRR, S1/S2, extremities warm and well perfused, no lower extremity edema  Pulm: normal work of breathing, lungs clear to auscultation, no crackles or wheezing  GI: Nontender, nondistended  Skin: warm, no jaundice  Neuro: Aox3, speech normal, moves all extremities symmetrically and equally  Psych: somewhat distressed from feeling uncomfortable      Data   Recent Labs   Lab 07/10/22  0823 07/10/22  0537 07/10/22  0040 07/09/22  1827 " 07/09/22  1543   WBC  --  3.8*  --   --  3.6*   HGB  --  9.2*  --   --  10.4*   MCV  --  103*  --   --  104*   PLT  --  80*  --   --  99*   INR  --   --   --   --  1.49*   NA  --  133*  --   --  132*   POTASSIUM  --  3.8  --   --  3.9   CHLORIDE  --  106  --   --  101   CO2  --  21*  --   --  22   BUN  --  10.2  --   --  12.2   CR  --  0.83  --   --  0.97*   ANIONGAP  --  6*  --   --  9   MAURI  --  8.1*  --   --  8.7   * 223* 196*   < > 513*   ALBUMIN  --  2.4*  --   --  3.0*   PROTTOTAL  --  5.1*  --   --  6.4   BILITOTAL  --  2.0*  --   --  2.1*   ALKPHOS  --  109*  --   --  132*   ALT  --  11  --   --  14   AST  --  36*  --   --  40*   LIPASE  --   --   --   --  6*    < > = values in this interval not displayed.

## 2022-07-10 NOTE — SUMMARY OF CARE
Reason for transfer:   Transferred from: [facility]  Report received from: Amaris RN  2 RN skin assessment completed by: Federico Sanchez algorithm reevaluated: No  Was Pulsate ordered?: No  Flu shot ordered? (October-April only): No  Detailed Belongings: white shoes, pink shirt, black purse, cell phone, gray sweatshirt, and   Green bra, black pants.  gino      RN Decompensation Assessment (Repeat at 12 hours)    (Additional guidance for RRT)      Mentation:  Exam is notable for abnormal mental status    Respiratory mechanics and oxygenation:  Exam is notable for stable oxygen requirements    Cardiovascular/perfusion:   Exam is notable for normal/unchanged cardiovascular/perfusion assessment    Overall estimation of the patient s condition/stability (1 = stable patient, 7 = appears very sick)? 3

## 2022-07-10 NOTE — H&P
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 Admitted for further workup and management. Chart review notable for 2/2022 admission with AMS (hypersomnolense)      Active problems:     # AMS ( MELD-Na 18 Ammonia 56)  # hx CUETO cirrhosis c/b ascietes and HE s/p TIPS (2021)  # Hypoalbuminemia   # Chronic macrocytic anemia and chronic thrombocytopenia   Reports 2-3 BMs/day ( off note some missed doses of lactulose this past week). In ED patient received lactulose 20 g. Admission head CT normal.   AMS ddx most likely HE vs hypo/hyperglycemia. Also considered hypothyroidism (5/20/2022 noted to have TSH  36.68/ T4 0.5) and nutritional deficiency of B12/9. unlikely neurological etiology given unremarkable physical exam and imaging findings.   - given PTA lactulose if AMS worsening consider escalation to add on Rifaximin   - hypoglycemia protocol ordered. Basal bolus insulin regimen ordered   - Tsh with reflex t4 ordered   -  Folate, iron and B12 ordered.   - Continue PTA spirolactone     # DM2  (last A1c - 5.1 2/2022)  -  Ordered Basal: 6U glargine Bolus: Medium sliding scale     # Hyponatremia   Likely prerenal in the setting of volume loss. S/p 1L NS in the ED.   - please continue to monitor.     # hx of DVT on AG  - Continue PTA anticoagulation     Chronic/stable problems:  #HLD   - continue PTA Atorvastatin 20mg    # Neuropathic pain    - Continue PTA pregabalin     # GERD   - continued PTA famotidine      # Pancreatic insufficiency   - Continue PTA CREON 24       Diet: Combination Diet Regular Diet Adult    DVT Prophylaxis: Apixaban 5mg BID   Rossi Catheter: Not present  Fluids:  None Able to consume PO  Central Lines: None  Cardiac Monitoring: None  Code Status: Full Code          Disposition Plan     Unknown pending clinical stability  The patient's care was discussed with the Attending Physician, Dr. Kirkpatrick.    VU VILLEGAS MD  Medicine Service, [facility]  Securely message with the Vocera Web Console (learn more here)  Text page via [facility] Paging/Directory   Please see signed in provider for up to date coverage information    ______________________________________________________________________    Chief Complaint   AMS    History is obtained from the patient    History of Present Illness   Susan Puckett is a 51 year old female with hx of CUETO cirrhosis c/b ascietes and HE s/p TIPS (2021) presents from home with . Patient reports 1 week of confusion. Reports overall compliance with medication except missing lactulose for 2-3 days. Also reports loss of appetite. Reports diffuse abdominal pain noting that her abdomen is more distended over the past few days. Denies dysuric symptoms. Reports has been having 3-4 BM/ day. Also reports erratic BGs this past week.       Review of Systems    The 10 point Review of Systems is negative other than noted in the HPI or here.     Past Medical History    I have reviewed this patient's medical history and updated it with pertinent information if needed.   Past Medical History:   Diagnosis Date     Anemia      Anxiety      Cold sore      Depressive disorder      Diabetes (H)     Type 2 DM/No Insulin      Encephalopathy      Esophageal varices without bleeding (H)     grade I     History of blood transfusion     10/2019     History of seizure     diabetic seizure     Insomnia      Liver cirrhosis secondary to CUETO (H) 05/28/2020     Migraine      Pleural effusion      Thrombocytopenia (H)      Thyroid disease        Past Surgical History   I have reviewed this patient's surgical history and  updated it with pertinent information if needed.  Past Surgical History:   Procedure Laterality Date     APPENDECTOMY      1989     COLONOSCOPY      1/2020 at [facility]      ENT SURGERY  1998    tempanoplasty     GI SURGERY      EGD August 2020 at [facility]      GYN SURGERY  2010    laparoscopic ablation     IR TRANSVEN INTRAHEPATIC PORTOSYST SHUNT  11/5/2021     MAMMOPLASTY REDUCTION BILATERAL  2004     MS STAB PHELBECTOMY VARICOSE VEINS, LESS THAN 10 INCISIONS, ONE EXTREMITY  2020     RNY gastric bypass  01/2006    retoocolic, retrogastric, [facility]     TONSILLECTOMY & ADENOIDECTOMY Bilateral 1978     WISDOM TEETH EXTRACTION         Social History   I have reviewed this patient's social history and updated it with pertinent information if needed. Susan Puckett  reports that she has never smoked. She has never used smokeless tobacco. She reports previous alcohol use. She reports that she does not use drugs.    Family History   I have reviewed this patient's family history and updated it with pertinent information if needed.  Family History   Problem Relation Age of Onset     Anesthesia Reaction Nephew         PONV     Bleeding Disorder No family hx of      Clotting Disorder No family hx of        Prior to Admission Medications   Prior to Admission Medications   Prescriptions Last Dose Informant Patient Reported? Taking?   CALCIUM CITRATE PO 7/9/2022 at am Spouse/Significant Other Yes Yes   Sig: Take 600 mg by mouth 2 times daily   Continuous Blood Gluc  (DEXCOM G6 ) LUNA  Spouse/Significant Other Yes No   Sig: Use as directed for continuous glucose monitoring.   Continuous Blood Gluc Sensor (DEXCOM G6 SENSOR) MISC  Spouse/Significant Other Yes No   Sig: Use as directed for continuous glucose monitoring.  Change sensor every 10 days.   Continuous Blood Gluc Transmit (DEXCOM G6 TRANSMITTER) MISC  Spouse/Significant Other Yes No   Sig: Use as directed for continuous glucose monitoring.  Change  every 3 months.   SUMAtriptan (IMITREX) 100 MG tablet Unknown at Unknown time Spouse/Significant Other Yes Yes   Sig: Take 0.5-1 tablets by mouth as needed for headaches   XIFAXAN 550 MG TABS tablet 7/9/2022 at am Spouse/Significant Other No Yes   Sig: TAKE 1 TABLET (550 MG) BY MOUTH 2 TIMES DAILY   acetaminophen (TYLENOL) 500 MG tablet More than a month at Unknown time Spouse/Significant Other No Yes   Sig: Take 1 tablet (500 mg) by mouth every 6 hours as needed for mild pain   amylase-lipase-protease (CREON 24) 80045-82501 units CPEP per EC capsule  Spouse/Significant Other No No   Sig: Take 1 capsule by mouth Take with snacks or supplements (with snacks)   amylase-lipase-protease (CREON 24) 64297-41278 units CPEP per EC capsule  Spouse/Significant Other No No   Sig: Take 2 capsules by mouth 3 times daily (with meals)   apixaban ANTICOAGULANT (ELIQUIS) 5 MG tablet  Spouse/Significant Other Yes No   Sig: Take 5 mg by mouth 2 times daily   atorvastatin (LIPITOR) 20 MG tablet 7/8/2022 at pm Spouse/Significant Other Yes Yes   Sig: Take 20 mg by mouth every evening    calcium carbonate-vitamin D (OSCAL W/D) 500-200 MG-UNIT tablet 7/9/2022 at am Spouse/Significant Other No Yes   Sig: Take 1 tablet by mouth 2 times daily (with meals) Take one tab once in AM and once in PM with meals   cyanocobalamin (VITAMIN B-12) 1000 MCG tablet 7/6/2022 Spouse/Significant Other Yes Yes   Sig: Take 1,000 mcg by mouth every 7 days Take 1 tablet by mouth every 7 days on Wednesdays   diazepam (VALIUM) 2 MG tablet Unknown at Unknown time Spouse/Significant Other Yes Yes   Sig: Take 1 tablet by mouth as needed for anxiety   escitalopram (LEXAPRO) 10 MG tablet 7/9/2022 at am Spouse/Significant Other No Yes   Sig: Take 1 tablet (10 mg) by mouth daily   famotidine (PEPCID) 20 MG tablet 7/9/2022 at am Spouse/Significant Other Yes Yes   Sig: Take 20 mg by mouth every morning (before breakfast)   ferrous gluconate (FERGON) 324 (38 Fe) MG tablet   Spouse/Significant Other Yes No   Sig: Take 1 tablet by mouth daily Take with Vitamin C supplement.   folic acid (FOLVITE) 1 MG tablet 7/9/2022 at am Spouse/Significant Other Yes Yes   Sig: Take 1 mg by mouth every evening    furosemide (LASIX) 40 MG tablet 7/9/2022 at am Spouse/Significant Other No Yes   Sig: Take 1 tablet (40 mg) by mouth daily   hydrOXYzine (ATARAX) 25 MG tablet Unknown at Unknown time Spouse/Significant Other Yes Yes   Sig: Take 25 mg by mouth 3 times daily as needed for anxiety   insulin aspart (NOVOLOG FLEXPEN) 100 UNIT/ML pen 7/8/2022 at Unknown time Spouse/Significant Other Yes Yes   Sig: Inject 1u/50>200 every 4 hours as needed for high blood glucose. Up to 20 units daily.  Indications: Diabetes Mellitus   insulin glargine (LANTUS PEN) 100 UNIT/ML pen 7/8/2022 at Unknown time Spouse/Significant Other No Yes   Sig: Inject 8 Units Subcutaneous every morning Takes around 12noon   Patient taking differently: Inject 4 Units Subcutaneous daily Takes around 12noon   insulin pen needle (BD GRISEL U/F) 32G X 4 MM miscellaneous  Spouse/Significant Other Yes No   Sig: Inject 1 each Subcutaneous   lactulose (CHRONULAC) 10 GM/15ML solution 7/9/2022 at am Spouse/Significant Other No Yes   Sig: TAKE 30 MLS BY MOUTH 3 TIMES DAILY   levothyroxine (SYNTHROID/LEVOTHROID) 175 MCG tablet  Spouse/Significant Other Yes No   Sig: Take 175 mcg by mouth daily   levothyroxine (SYNTHROID/LEVOTHROID) 200 MCG tablet 7/9/2022 at am Spouse/Significant Other Yes Yes   Sig: Take 1 tablet by mouth daily   melatonin 5 MG tablet Unknown at Unknown time Spouse/Significant Other Yes Yes   Sig: Take 5 mg by mouth At Bedtime   multivitamin CF FORMULA (DEKAS PLUS) capsule 7/8/2022 at pm Spouse/Significant Other No Yes   Sig: Take 1 capsule by mouth daily   ondansetron (ZOFRAN ODT) 8 MG ODT tab 7/9/2022 at am Spouse/Significant Other Yes Yes   Sig: Take 1 tablet by mouth every 8 hours as needed for nausea   ondansetron (ZOFRAN-ODT) 4  MG ODT tab  Spouse/Significant Other No No   Sig: Place 1 tablet (4 mg) under the tongue every 6 hours as needed for nausea   Patient taking differently: Place 4-8 mg under the tongue every 6 hours as needed for nausea   potassium chloride ER (K-TAB) 20 MEQ CR tablet 7/9/2022 at am Spouse/Significant Other No Yes   Sig: Take 2 tablets (40 mEq) by mouth 2 times daily   pregabalin (LYRICA) 25 MG capsule 7/8/2022 at pm Spouse/Significant Other No Yes   Sig: Take 1 capsule (25 mg) by mouth daily   prochlorperazine (COMPAZINE) 10 MG tablet Unknown at Unknown time Spouse/Significant Other Yes Yes   Sig: Take 10 mg by mouth every 6 hours as needed for nausea, vomiting or nausea   rizatriptan (MAXALT) 10 MG tablet 7/8/2022 at pm Spouse/Significant Other Yes Yes   Sig: Take 10 mg by mouth at onset of headache for migraine   sitagliptin (JANUVIA) 100 MG tablet 7/9/2022 at am Spouse/Significant Other Yes Yes   Sig: Take 100 mg by mouth every morning    spironolactone (ALDACTONE) 50 MG tablet 7/9/2022 at am Spouse/Significant Other No Yes   Sig: Take 2 tablets (100 mg) by mouth daily   topiramate (TOPAMAX) 50 MG tablet 7/9/2022 at am Spouse/Significant Other Yes Yes   Sig: Take 50 mg by mouth daily    traZODone (DESYREL) 100 MG tablet 7/8/2022 at pm Spouse/Significant Other Yes Yes   Sig: Take 0.5 tablets (50 mg) by mouth At Bedtime   valACYclovir (VALTREX) 1000 mg tablet Unknown at Unknown time Spouse/Significant Other Yes Yes   Sig: Take 1,000 mg by mouth daily as needed (at onset of cold sore)   vitamin A 3 MG (63826 UNITS) capsule 7/9/2022 at am Spouse/Significant Other Yes Yes   Sig: Take 10,000 Units by mouth daily    vitamin C (ASCORBIC ACID) 1000 MG TABS 7/9/2022 at am Spouse/Significant Other Yes Yes   Sig: Take 1,000 mg by mouth daily    vitamin D3 (CHOLECALCIFEROL) 50 mcg (2000 units) tablet 7/8/2022 at pm Spouse/Significant Other No Yes   Sig: Take 1 tablet (50 mcg) by mouth daily Take one tablet daily   vitamin E  (TOCOPHEROL) 400 units (180 mg) capsule 7/9/2022 at am Spouse/Significant Other Yes Yes   Sig: Take 400 Units by mouth daily       Facility-Administered Medications: None     Allergies   Allergies   Allergen Reactions     Methylprednisolone Hives     Droperidol Anxiety     Nsaids Other (See Comments)     GI bleed.     Prednisone Anxiety, Hives and Rash       Physical Exam   Vital Signs: Temp: 97.7  F (36.5  C) Temp src: Oral BP: 106/62 Pulse: 72   Resp: 16 SpO2: 100 % O2 Device: None (Room air)    Weight: 160 lbs 0 oz    General Appearance: Drowsy appearing women in no acute distress  Eyes: EOMI  HEENT: Normocephalic atraumatic   Respiratory: vesicular breath sounds bilaterally. Sat >95% on RA   Cardiovascular: Regular rate and rhythm  GI:  Periumbilical tenderness to palpation, bowel sounds heard over all 4 quadrants.   Skin: no rash  Musculoskeletal: no edema in 4/4 extremities   Neurologic: face symmetric, smile symmetric, EOMI  No nystagmus   Psychiatric: oriented to time place and person. Drowsy but arousable.     Data      Admission imaging     7/9/2022  CXR - unremarkable   Abdominal ultrasound - Patent TIPS   CT head - no acute intracranial pathology

## 2022-07-10 NOTE — PLAN OF CARE
Goal Outcome Evaluation:    Plan of Care Reviewed With: patient        VSS. Sleepy and Oriented x 4.  Had a regular diet for lunch and  felt  nauseous after eating. Zofran given for nausea.   Ambulated to bathroom with stand by assist of one.  Using call light appropriately.  Reports mild headache.  Had two stools since arrival to .  Bed alarm on for safety.

## 2022-07-11 ENCOUNTER — TELEPHONE (OUTPATIENT)
Dept: TRANSPLANT | Facility: CLINIC | Age: 52
End: 2022-07-11

## 2022-07-11 LAB
ALBUMIN SERPL BCG-MCNC: 2.3 G/DL (ref 3.5–5.2)
ALP SERPL-CCNC: 110 U/L (ref 35–104)
ALT SERPL W P-5'-P-CCNC: 9 U/L (ref 10–35)
ANION GAP SERPL CALCULATED.3IONS-SCNC: 6 MMOL/L (ref 7–15)
AST SERPL W P-5'-P-CCNC: 34 U/L (ref 10–35)
BILIRUB SERPL-MCNC: 1.5 MG/DL
BUN SERPL-MCNC: 11.6 MG/DL (ref 6–20)
CALCIUM SERPL-MCNC: 7.8 MG/DL (ref 8.6–10)
CHLORIDE SERPL-SCNC: 107 MMOL/L (ref 98–107)
CREAT SERPL-MCNC: 0.99 MG/DL (ref 0.51–0.95)
DEPRECATED HCO3 PLAS-SCNC: 22 MMOL/L (ref 22–29)
ERYTHROCYTE [DISTWIDTH] IN BLOOD BY AUTOMATED COUNT: 15 % (ref 10–15)
GFR SERPL CREATININE-BSD FRML MDRD: 69 ML/MIN/1.73M2
GLUCOSE BLDC GLUCOMTR-MCNC: 213 MG/DL (ref 70–99)
GLUCOSE BLDC GLUCOMTR-MCNC: 220 MG/DL (ref 70–99)
GLUCOSE BLDC GLUCOMTR-MCNC: 225 MG/DL (ref 70–99)
GLUCOSE BLDC GLUCOMTR-MCNC: 241 MG/DL (ref 70–99)
GLUCOSE BLDC GLUCOMTR-MCNC: 254 MG/DL (ref 70–99)
GLUCOSE BLDC GLUCOMTR-MCNC: 293 MG/DL (ref 70–99)
GLUCOSE BLDC GLUCOMTR-MCNC: 305 MG/DL (ref 70–99)
GLUCOSE BLDC GLUCOMTR-MCNC: 550 MG/DL (ref 70–99)
GLUCOSE BLDC GLUCOMTR-MCNC: 76 MG/DL (ref 70–99)
GLUCOSE SERPL-MCNC: 154 MG/DL (ref 70–99)
HCT VFR BLD AUTO: 27.4 % (ref 35–47)
HGB BLD-MCNC: 9.2 G/DL (ref 11.7–15.7)
INR PPP: 1.73 (ref 0.85–1.15)
MCH RBC QN AUTO: 34.3 PG (ref 26.5–33)
MCHC RBC AUTO-ENTMCNC: 33.6 G/DL (ref 31.5–36.5)
MCV RBC AUTO: 102 FL (ref 78–100)
PLATELET # BLD AUTO: 85 10E3/UL (ref 150–450)
POTASSIUM SERPL-SCNC: 3.9 MMOL/L (ref 3.4–5.3)
PROT SERPL-MCNC: 5 G/DL (ref 6.4–8.3)
RBC # BLD AUTO: 2.68 10E6/UL (ref 3.8–5.2)
SODIUM SERPL-SCNC: 135 MMOL/L (ref 136–145)
WBC # BLD AUTO: 3.8 10E3/UL (ref 4–11)

## 2022-07-11 PROCEDURE — 80053 COMPREHEN METABOLIC PANEL: CPT | Performed by: STUDENT IN AN ORGANIZED HEALTH CARE EDUCATION/TRAINING PROGRAM

## 2022-07-11 PROCEDURE — 99254 IP/OBS CNSLTJ NEW/EST MOD 60: CPT | Performed by: NURSE PRACTITIONER

## 2022-07-11 PROCEDURE — 250N000013 HC RX MED GY IP 250 OP 250 PS 637

## 2022-07-11 PROCEDURE — 36415 COLL VENOUS BLD VENIPUNCTURE: CPT | Performed by: STUDENT IN AN ORGANIZED HEALTH CARE EDUCATION/TRAINING PROGRAM

## 2022-07-11 PROCEDURE — 250N000011 HC RX IP 250 OP 636

## 2022-07-11 PROCEDURE — 85610 PROTHROMBIN TIME: CPT | Performed by: STUDENT IN AN ORGANIZED HEALTH CARE EDUCATION/TRAINING PROGRAM

## 2022-07-11 PROCEDURE — 250N000013 HC RX MED GY IP 250 OP 250 PS 637: Performed by: STUDENT IN AN ORGANIZED HEALTH CARE EDUCATION/TRAINING PROGRAM

## 2022-07-11 PROCEDURE — 250N000011 HC RX IP 250 OP 636: Performed by: STUDENT IN AN ORGANIZED HEALTH CARE EDUCATION/TRAINING PROGRAM

## 2022-07-11 PROCEDURE — 120N000002 HC R&B MED SURG/OB UMMC

## 2022-07-11 PROCEDURE — 85027 COMPLETE CBC AUTOMATED: CPT | Performed by: STUDENT IN AN ORGANIZED HEALTH CARE EDUCATION/TRAINING PROGRAM

## 2022-07-11 PROCEDURE — 99233 SBSQ HOSP IP/OBS HIGH 50: CPT | Performed by: STUDENT IN AN ORGANIZED HEALTH CARE EDUCATION/TRAINING PROGRAM

## 2022-07-11 RX ORDER — ENOXAPARIN SODIUM 100 MG/ML
40 INJECTION SUBCUTANEOUS EVERY 24 HOURS
Status: DISCONTINUED | OUTPATIENT
Start: 2022-07-11 | End: 2022-07-14 | Stop reason: HOSPADM

## 2022-07-11 RX ORDER — LACTULOSE 10 G/15ML
100 SOLUTION ORAL
Status: DISCONTINUED | OUTPATIENT
Start: 2022-07-11 | End: 2022-07-14 | Stop reason: HOSPADM

## 2022-07-11 RX ORDER — LACTULOSE 10 G/15ML
20 SOLUTION ORAL
Status: DISCONTINUED | OUTPATIENT
Start: 2022-07-11 | End: 2022-07-14 | Stop reason: HOSPADM

## 2022-07-11 RX ADMIN — LEVOTHYROXINE SODIUM 200 MCG: 100 TABLET ORAL at 08:49

## 2022-07-11 RX ADMIN — SENNOSIDES AND DOCUSATE SODIUM 1 TABLET: 8.6; 5 TABLET ORAL at 08:48

## 2022-07-11 RX ADMIN — LACTULOSE 20 G: 20 SOLUTION ORAL at 11:35

## 2022-07-11 RX ADMIN — FOLIC ACID 1 MG: 1 TABLET ORAL at 19:47

## 2022-07-11 RX ADMIN — Medication 1 TABLET: at 17:29

## 2022-07-11 RX ADMIN — PANCRELIPASE 2 CAPSULE: 24000; 76000; 120000 CAPSULE, DELAYED RELEASE PELLETS ORAL at 17:29

## 2022-07-11 RX ADMIN — RIFAXIMIN 550 MG: 550 TABLET ORAL at 08:49

## 2022-07-11 RX ADMIN — FUROSEMIDE 40 MG: 20 TABLET ORAL at 08:49

## 2022-07-11 RX ADMIN — ACETAMINOPHEN 325 MG: 325 TABLET, FILM COATED ORAL at 03:04

## 2022-07-11 RX ADMIN — FAMOTIDINE 20 MG: 20 TABLET ORAL at 08:48

## 2022-07-11 RX ADMIN — Medication 1 TABLET: at 08:48

## 2022-07-11 RX ADMIN — ESCITALOPRAM OXALATE 10 MG: 10 TABLET ORAL at 08:49

## 2022-07-11 RX ADMIN — PANTOPRAZOLE SODIUM 40 MG: 40 TABLET, DELAYED RELEASE ORAL at 08:48

## 2022-07-11 RX ADMIN — ONDANSETRON 4 MG: 4 TABLET, ORALLY DISINTEGRATING ORAL at 08:45

## 2022-07-11 RX ADMIN — SPIRONOLACTONE 100 MG: 100 TABLET ORAL at 08:49

## 2022-07-11 RX ADMIN — ATORVASTATIN CALCIUM 20 MG: 20 TABLET, FILM COATED ORAL at 19:47

## 2022-07-11 RX ADMIN — LACTULOSE 30 G: 20 SOLUTION ORAL at 13:59

## 2022-07-11 RX ADMIN — PANCRELIPASE 2 CAPSULE: 24000; 76000; 120000 CAPSULE, DELAYED RELEASE PELLETS ORAL at 15:22

## 2022-07-11 RX ADMIN — RIFAXIMIN 550 MG: 550 TABLET ORAL at 19:47

## 2022-07-11 RX ADMIN — Medication 1000 MCG: at 08:48

## 2022-07-11 RX ADMIN — INSULIN ASPART 2 UNITS: 100 INJECTION, SOLUTION INTRAVENOUS; SUBCUTANEOUS at 08:45

## 2022-07-11 RX ADMIN — PREGABALIN 25 MG: 25 CAPSULE ORAL at 08:48

## 2022-07-11 RX ADMIN — LACTULOSE 30 G: 20 SOLUTION ORAL at 19:47

## 2022-07-11 RX ADMIN — LACTULOSE 30 G: 20 SOLUTION ORAL at 08:45

## 2022-07-11 RX ADMIN — FERROUS GLUCONATE 324 MG: 324 TABLET ORAL at 08:49

## 2022-07-11 RX ADMIN — PANCRELIPASE 2 CAPSULE: 24000; 76000; 120000 CAPSULE, DELAYED RELEASE PELLETS ORAL at 08:47

## 2022-07-11 RX ADMIN — ENOXAPARIN SODIUM 40 MG: 40 INJECTION SUBCUTANEOUS at 17:32

## 2022-07-11 ASSESSMENT — ACTIVITIES OF DAILY LIVING (ADL)
ADLS_ACUITY_SCORE: 53
ADLS_ACUITY_SCORE: 55
ADLS_ACUITY_SCORE: 52
ADLS_ACUITY_SCORE: 52
ADLS_ACUITY_SCORE: 55
ADLS_ACUITY_SCORE: 52
ADLS_ACUITY_SCORE: 53
ADLS_ACUITY_SCORE: 52
ADLS_ACUITY_SCORE: 55
ADLS_ACUITY_SCORE: 53
ADLS_ACUITY_SCORE: 52
ADLS_ACUITY_SCORE: 55

## 2022-07-11 NOTE — PLAN OF CARE
Goal Outcome Evaluation: Sleepy but oriented x4. C/o abdominal tenderness and intermittent nausea. BM x2 this evening. Up to BR with SBA. Blood glucose 353 before meal. Continue to monitor orientation. Assess pain and effectiveness of interventions. Monitor GI status.     Plan of Care Reviewed With: patient     Overall Patient Progress: no change    Outcome Evaluation: Pt sleepy but alert and oriented x4. Hyperglycemic.        RN Decompensation Assessment (Repeat at 12 hours)    Mentation:  Exam is notable for abnormal mental status (changed from baseline)    Respiratory mechanics and oxygenation:  Exam is notable for normal/unchanged breathing pattern    Cardiovascular/perfusion:   Exam is notable for normal/unchanged cardiovascular/perfusion assessment    Overall estimation of the patient s condition/stability (1 = stable patient, 7 = appears very sick)? 3

## 2022-07-11 NOTE — PROGRESS NOTES
Per chart review, pt is currently hospitalized with altered mental status and hyperglycemia. Will continue to follow plan of care and reach out to pt once discharged.

## 2022-07-11 NOTE — PLAN OF CARE
"Blood pressure 102/61, pulse 68, temperature 98  F (36.7  C), temperature source Oral, resp. rate 16, height 1.676 m (5' 6\"), weight 72.1 kg (158 lb 15.2 oz), SpO2 100 %, RA .      Assumed care     NEURO: A/o x 4. Slow to respond at times. Neuros Q4H  VS: VSS on RA  ACTIVITY: Up with SBA  PAIN:  Denies pain no c/o headache .  CARDIAC: No C/o CP  RESP: No C/o SOB  GI/: Voiding adequately  BM x 2 , given  scheduled  Lacteus  and extra  x1  DIET: Appetite poor BS , 225, 213, 241 insulin given per order.   SKIN:  Abdominal bruises    LDA'S: L PIV SL.    Call light within reach bed  alarm on . Continue monitor closely and  update MD with change       Goal Outcome Evaluation:    Plan of Care Reviewed With: patient     Overall Patient Progress: no change           "

## 2022-07-11 NOTE — CONSULTS
"New In-Patient Diabetes/Hyperglycemia Management Consult    Susan Puckett  Age: 52 year old  MRN # [id number]   YOB: 1970    Chief Complaint: AMS in presence of CUETO cirrhosis c/b ascites  Reason for consult: \"poorly controlled diabetes with severe hyperlgycemiia\"  Consult requested by: Joe Lin MD    All information gathered via chart review and face-to-face interview and assessment  Professional  utilized --> No    History of Present Illness:  Susan Puckett is a 51 year old female with hx of CUETO cirrhosis c/b ascites and HE s/p TIPS (2021) and DM2  Presented to ED with concerns for AMS with initial unremarkable workup except electrolyte derangements. Admitted for further workup and management. Chart review notable for 2/2022 admission with AMS (hypersomnolence)        Diabetes:   Susan Puckett has T2DM, she cannot recall the exact date she was diagnosed but believes it to be about 3 years ago with a worsening of her diabetes within the past year.     Earliest A1c found on record was 7.5% from 12/13/2018.     She was initially started on Metformin, could not recall the dosing but denies any adverse SE's - records reflect dosage was largely 1000 mg BID.    She was then started on insulin as well as Januvia.     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  Denies any hx of DKA.   She wears a Dexcom CGM and  follows along. Also has glucometer in event Dexcom not working etc.   She has Basqimi for lows, does not like tablets and does not have injectable. Typically will eat a banana or candies if low.    shares an episode of severe hypoglycemia about 1 year ago that resulted in a seizure and need for paramedic intervention.     She is followed by Endocrinologist Dr. CHILO Stovall of [facility] - [phone]. Last office visit was 3/29/2022.     Most recently Susan has been taking Lantus 3 unit(s) daily - usually around 12 " noon  Last dose basal insulin:  Yesterday at 0900-> 6 unit(s) lantus  She reports taking Novolog 1 unit(s) for a meal if  - 250  2 unit(s) with a meal if BG >250  Does not take any insulin after 8 PM 2/2 history of lows      Inpatient regimen at time of consult:  Lantus 6 unit(s) - not given this AM - will reduce for safety at this time and trend for adjustments.   Medium resistance sliding scale insulin TID AC  Fixed dose for meals of novolog 5 unit(s) - now discontinued    BG at time of consult: extreme lability - appears consistent with uncovered snacking and then followed by post-prandial readings and then getting too much sliding scale insulin as a result.        Relevant Labs:     Covid-19 negative  BG >600 on arrival,   A1c 7.3%, 4 months ago was 5.1%  Na 135  K 3.9  Bicarb 22  Anion Gap 6  Creat 0.99/GFR 69  Hgb 9.2  Ketones: negative  Lipase 6  Ammonia 56    BG trends:                     11:42 AM  Call to patient to quickly touch base re: insulin, no answer.  Will make conservative adjustments based on trend and chart review and see later this afternoon.   11:58 AM  Talked with RN who does confirm the patient has been snacking and not telling the RNs.  Did this overnight with candies.   RN will re-check a BG when lunch arrives and will call/text page writer for a custom order for her meal coverage.   Has received the lantus and sliding scale insulin which were just ordered.     1600 hrs:  Able to meet with Susan and her .  Susan is frequently dozing off when answering questions but is awake with reliable answers when roused.  She is ok with the plan for the Lantus 3 unit(s) today and possibly an increase tomorrow to 4 unit(s) if Bg consistently > 200.  Would like to do a more specific cho coverage such as 1 per 30g cho which we will trial starting at her next meal.  She did finish lunch quite late so timing has been off.  Will order it for AC dinner.     They are very interested in cho counting  education going forward, will place order  She is upset her Dexcom was removed.  Will connect with Diab Educators to see what/if any options available.  She had it on 3 days only.   Aware there may be an interval before BGs are a bit more stable but given her sensitivity, could be challenging.   She will try to remember to call RN before eating/snacking to avoid post-prandial reads and overcorrection.     Her focus is on decreased fatigue and less brain-fog.       D5W-containing solutions/medications/confounding factors: on enzymes, lactulose, no dextrose and no steroids planned.     Planned Procedures/surgeries: none  ELOS:  TBD - at least 1-2 days    Diabetes:  Recent Labs   Lab 07/11/22  0757 07/11/22  0558 07/11/22  0451 07/11/22  0338 07/11/22  0203 07/10/22  2129   * 154* 76 220* 550* 225*       Diabetes Type:  Type 2 Diabetes  (AILIN-65 mariia, c-peptide, islet cell mariia - none found in Epic review.       Diabetes Duration: 2018  Diabetes Control:   Lab Results   Component Value Date    A1C 7.3 07/09/2022    A1C 5.1 02/18/2022    A1C 5.6 01/10/2022    A1C 5.4 12/26/2021       Usual (PTA) Diabetes Regimen:     Medications:     Lantus 3 unit(s) in AM  Novolog 2 unit(s) AC breakfast + 1 unit(s) if BG >200, 1 unit(s) AC dinner - per chart     She reports taking Novolog 1 unit(s) for a meal if  - 250  2 unit(s) with a meal if BG >250  Does not take any insulin after 8 PM 2/2 history of lows      BG monitoring frequency:  Wears a CGM - Dexcom - was removed in ER she thinks - was on abd, is no longer.     BG trends at home: labile     Historical:   Hx of Metformin 1000 mg BID - stopped    Diet: no restrictions    Bf: eggs/toast/cheese   Lunch: sandwiches primarily  Dinner: variety - fast food > 2 times per week  Snacks: cheese and crackers  Beverages: mixes Sprite zero with pineapple ad juice, rarely water     Issues with food insecurity:  denies  Recent weight changes:  denies    Exercise: mostly  "sedentary    Safety Kit:  Doesn't like tablets, has Baqsimi, wants glucagon injectable  Medic Alert:  no    Ability to [place] Prescribed Regimen: TBD, Barriers exist    Diabetes Complications:  retinopathy - denies, last dilated eye exam Feb 2022  Peripheral neuropathy - denies  Nephropathy - denies, recent microalbuminuria - none found in chart review  Gastroparesis - denies  S/P Faustino-en-Y    History of DKA:   denies  Able to Detect Hypoglycemia: yes but does have some hypoglycemia unawareness  Hx of x1 diabetic seizure about 1 year ago - paramedics came and after glucagon     Usual Diabetes Care Provider/CDCES: Dr. Rivera Stovall - Kaycee Muhammad [id number]    Other active medical problems acutely impacting BG control:  Liver failure takes lactulose, AMS - waxing/waning alertness - falls asleep in the middle of tasks.     Factors Impacting Glucose Control:  No Steroids, no tube feeding diet, ? diet/eating changes - more with the increased lethargy, + changes in activity level     Review of Systems:    CC:  \"just feeling mostly so tired and with brain fog - no real pain tho\"  Constitutional:   No fever, no chills  ENT/Mouth:   Hearing at level of conversation, denies hearing changes, no ear pain, no sore throat, no rhinorrhea, no difficulty swallowing  Eyes:  No eye pain, no discharge, no vision changes  CV:   No CP/SOB, no new edema  Resp:  No cough, no wheezing  GI:   denies nausea, no vomiting, denies constipation, no changes to diarrhea  :  No dysuria, no frequency, no hematuria  Musk:  No joint swelling/pain, No back pain  Skin/heme:   No new rashes/bruises/open areas.  No pruritis  Neuro:   No new weakness, denies numbness/tingling, no headache  Psych:   No new anxiety, denies changes/worsening mood concerns  Endocrine:  No polyuria, no polydipsia      Past medical, family and social histories are reviewed and updated.    Past Medical History  Past Medical History:   Diagnosis Date     Anemia      " "Anxiety      Cold sore      Depressive disorder      Diabetes (H)     Type 2 DM/No Insulin      Encephalopathy      Esophageal varices without bleeding (H)     grade I     History of blood transfusion     10/2019     History of seizure     diabetic seizure     Insomnia      Liver cirrhosis secondary to CUETO (H) 05/28/2020     Migraine      Pleural effusion      Thrombocytopenia (H)      Thyroid disease        Family History  Family History   Problem Relation Age of Onset     Anesthesia Reaction Nephew         PONV     Bleeding Disorder No family hx of      Clotting Disorder No family hx of      ++ family hx of DM in paternal grandfather and father     Social History  Social History     Socioeconomic History     Marital status:      Spouse name: None     Number of children: None     Years of education: None     Highest education level: Bachelor's degree (e.g., BA, AB, BS)   Tobacco Use     Smoking status: Never Smoker     Smokeless tobacco: Never Used   Substance and Sexual Activity     Alcohol use: Not Currently     Comment: Last drink was in 2017     Drug use: Never     Social Determinants of Health     Food Insecurity: No Food Insecurity     Worried About Running Out of Food in the Last Year: Never true     Ran Out of Food in the Last Year: Never true   Transportation Needs: No Transportation Needs     Lack of Transportation (Medical): No     Lack of Transportation (Non-Medical): No       Lives with  in [place], [place]  Etoh: denies  Drugs: no  Smoking: no      Physical Exam:  /61 (BP Location: Right arm)   Pulse 68   Temp 98  F (36.7  C) (Oral)   Resp 16   Ht 1.676 m (5' 6\")   Wt 71.1 kg (156 lb 11.2 oz)   SpO2 100%   BMI 25.29 kg/m        General:  pleasant, no acute distress. Falling asleep numerous times during conversation, forgetful at times, deferring to . Slow/deliberate speech.   HEENT: NC/AT, PER and anicteric, non-injected, oral mucous membranes moist.   Lungs: " non-labored, no cough, no SOB  ABD: rounded - notable central obesity with thin extremities  Skin: warm and dry, no obvious lesions  Feet: CMS intact   MSK: fluid movement   Lymp:  no LE edema   Mental status: lethargic, oriented x3, communicating clearly but at times forgetful   Psych: calm, appropriate mood    Laboratory  Recent Labs   Lab Test 07/11/22  0757 07/11/22  0558 07/10/22  0823 07/10/22  0537   NA  --  135*  --  133*   POTASSIUM  --  3.9  --  3.8   CHLORIDE  --  107  --  106   CO2  --  22  --  21*   ANIONGAP  --  6*  --  6*   * 154*   < > 223*   BUN  --  11.6  --  10.2   CR  --  0.99*  --  0.83   MAURI  --  7.8*  --  8.1*    < > = values in this interval not displayed.     CBC RESULTS:   Recent Labs   Lab Test 07/11/22  0558   WBC 3.8*   RBC 2.68*   HGB 9.2*   HCT 27.4*   *   MCH 34.3*   MCHC 33.6   RDW 15.0   PLT 85*       Liver Function Studies -   Recent Labs   Lab Test 07/11/22  0558   PROTTOTAL 5.0*   ALBUMIN 2.3*   BILITOTAL 1.5*   ALKPHOS 110*   AST 34   ALT 9*       Active Medications  Current Facility-Administered Medications   Medication     acetaminophen (TYLENOL) tablet 325 mg     amylase-lipase-protease (CREON 24) 47086-77683 units per EC capsule 1 capsule     amylase-lipase-protease (CREON 24) 86678-11253 units per EC capsule 2 capsule     atorvastatin (LIPITOR) tablet 20 mg     calcium carbonate-vitamin D (OS-MARUI with D) per tablet 1 tablet     cyanocobalamin (VITAMIN B-12) tablet 1,000 mcg     Daily 2 GRAM acetaminophen limit, unless fulminent liver failure or transaminases greater than or equal to 300 - 400, then none     glucose gel 15-30 g    Or     dextrose 50 % injection 25-50 mL    Or     glucagon injection 1 mg     escitalopram (LEXAPRO) tablet 10 mg     famotidine (PEPCID) tablet 20 mg     ferrous gluconate (FERGON) tablet 324 mg     folic acid (FOLVITE) tablet 1 mg     furosemide (LASIX) tablet 40 mg     insulin aspart (NovoLOG) injection (RAPID ACTING)     insulin  aspart (NovoLOG) injection (RAPID ACTING)     insulin glargine (LANTUS PEN) injection 3 Units     lactulose (CHRONULAC) solution 20 g    Or     lactulose (CHRONULAC) solution for enema prep 100 g     lactulose (CHRONULAC) solution 30 g     levothyroxine (SYNTHROID/LEVOTHROID) tablet 200 mcg     lidocaine (LMX4) cream     lidocaine 1 % 0.1-1 mL     melatonin tablet 1 mg     ondansetron (ZOFRAN ODT) ODT tab 4 mg    Or     ondansetron (ZOFRAN) injection 4 mg     pantoprazole (PROTONIX) EC tablet 40 mg     Patient is already receiving anticoagulation with heparin, enoxaparin (LOVENOX), warfarin (COUMADIN)  or other anticoagulant medication     pregabalin (LYRICA) capsule 25 mg     rifaximin (XIFAXAN) tablet 550 mg     senna-docusate (SENOKOT-S/PERICOLACE) 8.6-50 MG per tablet 1 tablet    Or     senna-docusate (SENOKOT-S/PERICOLACE) 8.6-50 MG per tablet 2 tablet     sodium chloride (PF) 0.9% PF flush 3 mL     sodium chloride (PF) 0.9% PF flush 3 mL     spironolactone (ALDACTONE) tablet 100 mg     No current outpatient medications on file.       Current Diet  Orders Placed This Encounter      Combination Diet Regular Diet Adult    Assessment:    1)  Type 2 Diabetes Mellitus; worsening control.  A1c 7.3%  2)  Severe lability with BG control   3)  CUETO cirrhosis c/b cirrhosis  4)  Alt mental status      Plan:      -  Reduce to Lantus 3 unit(s) every 24 hours at 1200 - trending for adjustments for tomorrows dosing    -  Add/adjust Novolog meal coverage 1 unit(s) per 30g cho AC breakfast/lunch/snacks and 1 unit(s) per 45 g cho for dinner and no meal insulin after 2000 hrs    -  Decrease novolog low resistance sliding scale insulin TID AC/HS    -  BG monitoring TID AC, HS and 0200    -  Hypoglycemia protocol    -  Recommend carb counting protocol    -  Education :  TBD     -  Outpatient follow up:  recommend [facility] vs PCP      -  Thank you for this consult, IDS will follow      CAIN Gordillo CNP    Inpatient Diabetes Management Service  Pager - [phone]  Friday - Monday 0800 - 1600 hrs    To contact Endocrine Diabetes service:   From 8AM-4PM: page inpatient diabetes provider that is following the patient that day (see filed or incomplete progress notes/consult notes).  If uncertain of provider assignment: page job code 3 *'s,  [phone] then enter [phone].  For questions or updates from 4PM-8AM: page the diabetes job code for on call fellow: [phone]    Please notify inpatient diabetes service if changes are planned to steroids, nutrition, or if procedures are planned requiring prolonged NPO status.Diabetes Management Team job code: [phone]       I spent a total of 80 minutes bedside and on the inpatient unit managing glycemic care.  Over 50% of my time on the unit was spent counseling the patient and/or coordinating care regarding acute hyperglycemic management.  See note for details.

## 2022-07-11 NOTE — PROGRESS NOTES
[facility]    Medicine Progress Note - Hospitalist Service, GOLD TEAM 9    Date of Admission:  7/9/2022    Assessment & Plan        Altered mental status, improving  Hepatic encephalopathy, likely  Presents with one week of confusion after missing some doses of lactulose. Workup on admission notable for CT head that is unremarkable, mild hyponatremia at 132, ammonia 56, glucose 513 -> 223, stable CBC. No clear e/o infection. Mental status perhaps marginally improved with lactulose administration. Patient denies recent alcohol use. Abd US with patent TIPS, no ascites. Treat for hepatic encephalopathy and see if she clears over next 24-48h.  -Continue lactulose, titrate to 2-3 bowel movements.   -Daily CMP, CBC.   -Treat hyperglycemia; perhaps lability in this has affected mentation as well.   -Monitor for infection, watch culture data.   -Continue diuretics, monitor renal function daily.     Chronic  T2DM, hyperglycemia: continue basal and bolus insulin. There has been great lability in glycemia here. Endocrine consultation ordered for assistance with carbohydrate coverage as there seems to be lability about mealtimes. Will obtain daily BMP.  Anemia: Daily CBC.   H/o DVT in arm associated with PICC line: patient not taking apixaban anymore per pharmacy, seems to have completed 3 months treatment earlier this year. No symptoms in arms.   HLD: continue statin.  GERD: continue PPI.  Neuropathy: continue pregabalin.    Hypothyroidism: continue synthroid.      Diet: Combination Diet Regular Diet Adult    DVT Prophylaxis: Enoxaparin  Rossi Catheter: Not present  Central Lines: None  Cardiac Monitoring: None  Code Status: Full Code        The patient's care was discussed with the Bedside Nurse and Patient.    Joe Lin MD  Hospitalist Service, GOLD TEAM 9  [facility]  Securely message with the Vocera Web Console (learn more  "here)  Text page via [facility] Paging/Directory   Please see signed in provider for up to date coverage information      Clinically Significant Risk Factors Present on Admission                # Hypertension: home medication list includes antihypertensive(s)  # Overweight: Estimated body mass index is 25.66 kg/m  as calculated from the following:    Height as of this encounter: 1.676 m (5' 6\").    Weight as of this encounter: 72.1 kg (158 lb 15.2 oz).        ______________________________________________________________________    Interval History   Has stool output.   Denies abd pain.   Still feels tired.   Discussed endocrine consultation today.   4 point ROS negative    Data reviewed today: I reviewed all medications, new labs and imaging results over the last 24 hours. I personally reviewed no images or EKG's today.    Physical Exam   Vital Signs: Temp: 98  F (36.7  C) Temp src: Oral BP: 102/61 Pulse: 68   Resp: 16 SpO2: 100 % O2 Device: None (Room air)    Weight: 158 lbs 15.23 oz  Exam  Gen: awake and alert, appears comfortable, appears stated age  HEENT: NCAT, sclerae anicteric  CV: RRR, S1/S2, extremities warm and well perfused, no lower extremity edema  Pulm: normal work of breathing, lungs clear to auscultation, no crackles or wheezing  GI: Nontender, nondistended  Skin: warm, no jaundice  Neuro: Aox3, speech normal, moves all extremities symmetrically and equally  Psych: somewhat distressed from feeling uncomfortable      Data   Recent Labs   Lab 07/11/22  1406 07/11/22  1145 07/11/22  0757 07/11/22  0558 07/10/22  0823 07/10/22  0537 07/09/22  1827 07/09/22  1543   WBC  --   --   --  3.8*  --  3.8*  --  3.6*   HGB  --   --   --  9.2*  --  9.2*  --  10.4*   MCV  --   --   --  102*  --  103*  --  104*   PLT  --   --   --  85*  --  80*  --  99*   INR  --   --   --  1.73*  --   --   --  1.49*   NA  --   --   --  135*  --  133*  --  132*   POTASSIUM  --   --   --  3.9  --  3.8  --  3.9   CHLORIDE  --   --   --  " 107  --  106  --  101   CO2  --   --   --  22  --  21*  --  22   BUN  --   --   --  11.6  --  10.2  --  12.2   CR  --   --   --  0.99*  --  0.83  --  0.97*   ANIONGAP  --   --   --  6*  --  6*  --  9   MAURI  --   --   --  7.8*  --  8.1*  --  8.7   * 213* 225* 154*   < > 223*   < > 513*   ALBUMIN  --   --   --  2.3*  --  2.4*  --  3.0*   PROTTOTAL  --   --   --  5.0*  --  5.1*  --  6.4   BILITOTAL  --   --   --  1.5*  --  2.0*  --  2.1*   ALKPHOS  --   --   --  110*  --  109*  --  132*   ALT  --   --   --  9*  --  11  --  14   AST  --   --   --  34  --  36*  --  40*   LIPASE  --   --   --   --   --   --   --  6*    < > = values in this interval not displayed.

## 2022-07-11 NOTE — TELEPHONE ENCOUNTER
"Voice mail received from patient and spouse.    - admitted over weekend for confusion and hyperglycemia    -  Notified Shelby Vicente, Hepatology NP  (patient currently listed for transplant, MELD 16 today, listed at 17.)     Has endocrine consult ordered while inpatient, will see if she can get inpatient nutrition consult also if not already ordered as well as daily MELD labs    Susan very emotional and tearful.  DIscussed critical importance of compliance with lactulose, especially post-TIPS.  Discussed importance of following diet to manage glucose, following recs.      Spouse voice mail stated concerned that he wants to ensure that she's \"all better\" before discharge, Susan to inform her  that spoke to patient directly.      Discussed living with chronic condition, and that we have to focus on factors that she can control    "

## 2022-07-11 NOTE — PROVIDER NOTIFICATION
5232-2   Jaiden 9  Suzi MOSS [id number]  Pt C/o anxiety r/t low BG level post SSI- Requesting Atarax- Pt states PTA med. Thanks    Jaiden LEIJA text paged at [phone] via [facility]

## 2022-07-11 NOTE — PLAN OF CARE
"Goal Outcome Evaluation:    Plan of Care Reviewed With: patient     Overall Patient Progress: no change    Outcome Evaluation: Pt sleepy and feels \"weak\"- A/o x 4.    ASSUMED CARES: 3607-3239- Pt's BIRTHDAY today  STATUS: Pt admitted 7/9 for Hyperglycemia. Hepatic Enceph. Du Cirrhosis.   NEURO: A/o x 4. Slow to respond at times. Neuros Q4H  VS: VSS on RA  ACTIVITY: Up with SBA  PAIN: C/o headache- PRN Apap given x 1.   CARDIAC: No C/o CP  RESP: No C/o SOB  GI/: Voiding spontaneously. BM x 4 overnight. BM goal 2-3/day.   DIET: Regular  SKIN: Pt states no skin issues. No adjustments made.   LDA'S: L PIV SL.   LABS:  at 0200- Pt stated that after she received her sliding scale insulin at HS, she found 2 packs of fruit snacks in her bag and ate them- this was passed on to team as well- One time 5 units sliding scale insulin ordered and given- Recheck (220) (76)- pt felt BG level was low and requested a check- Increased anxiety after this- requesting PRN Atarax- Team notified.           CHANGES THIS SHIFT: Elevated BG level 550- 5Units SSi given- recheck (220,76). Increased anxiety near end of shift. Pt otherwise pleasant. Pt is requesting Trazodone be added at HS to help with sleep- pt states PTA med.   POC: Cont with POC. Monitor mentation and BG levels. Dispo TBD. Bed alarm on for pt safety. Call light within reach.         "

## 2022-07-11 NOTE — PROVIDER NOTIFICATION
FYI  5B 5232-2   Jaiden   Suzi RN [id number]  Pt BG level 550. Thanks    Jaiden LEIJA text paged at [phone] via [facility].     Addendum: Writer did ask pt if she ate anything after previous nurse gave One unit sliding scale insulin at - Pt admitted to finding 2 packs of fruit snacks in bag and ate them- This was told to MD- placed order for 5 units of Novolog.

## 2022-07-12 ENCOUNTER — APPOINTMENT (OUTPATIENT)
Dept: OCCUPATIONAL THERAPY | Facility: CLINIC | Age: 52
DRG: 442 | End: 2022-07-12
Attending: STUDENT IN AN ORGANIZED HEALTH CARE EDUCATION/TRAINING PROGRAM
Payer: COMMERCIAL

## 2022-07-12 LAB
ALBUMIN SERPL BCG-MCNC: 2.5 G/DL (ref 3.5–5.2)
ALP SERPL-CCNC: 119 U/L (ref 35–104)
ALT SERPL W P-5'-P-CCNC: 9 U/L (ref 10–35)
ANION GAP SERPL CALCULATED.3IONS-SCNC: 4 MMOL/L (ref 7–15)
AST SERPL W P-5'-P-CCNC: 34 U/L (ref 10–35)
BILIRUB SERPL-MCNC: 1.5 MG/DL
BUN SERPL-MCNC: 13.2 MG/DL (ref 6–20)
CALCIUM SERPL-MCNC: 8 MG/DL (ref 8.6–10)
CHLORIDE SERPL-SCNC: 105 MMOL/L (ref 98–107)
CREAT SERPL-MCNC: 0.84 MG/DL (ref 0.51–0.95)
DEPRECATED HCO3 PLAS-SCNC: 22 MMOL/L (ref 22–29)
ERYTHROCYTE [DISTWIDTH] IN BLOOD BY AUTOMATED COUNT: 15.1 % (ref 10–15)
GFR SERPL CREATININE-BSD FRML MDRD: 84 ML/MIN/1.73M2
GLUCOSE BLDC GLUCOMTR-MCNC: 200 MG/DL (ref 70–99)
GLUCOSE BLDC GLUCOMTR-MCNC: 240 MG/DL (ref 70–99)
GLUCOSE BLDC GLUCOMTR-MCNC: 253 MG/DL (ref 70–99)
GLUCOSE BLDC GLUCOMTR-MCNC: 302 MG/DL (ref 70–99)
GLUCOSE BLDC GLUCOMTR-MCNC: 333 MG/DL (ref 70–99)
GLUCOSE SERPL-MCNC: 231 MG/DL (ref 70–99)
HCT VFR BLD AUTO: 31 % (ref 35–47)
HGB BLD-MCNC: 9.9 G/DL (ref 11.7–15.7)
MCH RBC QN AUTO: 33 PG (ref 26.5–33)
MCHC RBC AUTO-ENTMCNC: 31.9 G/DL (ref 31.5–36.5)
MCV RBC AUTO: 103 FL (ref 78–100)
PLATELET # BLD AUTO: 85 10E3/UL (ref 150–450)
POTASSIUM SERPL-SCNC: 3.6 MMOL/L (ref 3.4–5.3)
PROT SERPL-MCNC: 5.3 G/DL (ref 6.4–8.3)
RBC # BLD AUTO: 3 10E6/UL (ref 3.8–5.2)
SODIUM SERPL-SCNC: 131 MMOL/L (ref 136–145)
WBC # BLD AUTO: 4 10E3/UL (ref 4–11)

## 2022-07-12 PROCEDURE — 120N000002 HC R&B MED SURG/OB UMMC

## 2022-07-12 PROCEDURE — 97535 SELF CARE MNGMENT TRAINING: CPT | Mod: GO

## 2022-07-12 PROCEDURE — 36415 COLL VENOUS BLD VENIPUNCTURE: CPT | Performed by: STUDENT IN AN ORGANIZED HEALTH CARE EDUCATION/TRAINING PROGRAM

## 2022-07-12 PROCEDURE — 250N000011 HC RX IP 250 OP 636: Performed by: STUDENT IN AN ORGANIZED HEALTH CARE EDUCATION/TRAINING PROGRAM

## 2022-07-12 PROCEDURE — 250N000013 HC RX MED GY IP 250 OP 250 PS 637

## 2022-07-12 PROCEDURE — 82040 ASSAY OF SERUM ALBUMIN: CPT | Performed by: STUDENT IN AN ORGANIZED HEALTH CARE EDUCATION/TRAINING PROGRAM

## 2022-07-12 PROCEDURE — 85027 COMPLETE CBC AUTOMATED: CPT | Performed by: STUDENT IN AN ORGANIZED HEALTH CARE EDUCATION/TRAINING PROGRAM

## 2022-07-12 PROCEDURE — 250N000011 HC RX IP 250 OP 636

## 2022-07-12 PROCEDURE — 99233 SBSQ HOSP IP/OBS HIGH 50: CPT | Performed by: NURSE PRACTITIONER

## 2022-07-12 PROCEDURE — 250N000013 HC RX MED GY IP 250 OP 250 PS 637: Performed by: STUDENT IN AN ORGANIZED HEALTH CARE EDUCATION/TRAINING PROGRAM

## 2022-07-12 PROCEDURE — 80053 COMPREHEN METABOLIC PANEL: CPT | Performed by: STUDENT IN AN ORGANIZED HEALTH CARE EDUCATION/TRAINING PROGRAM

## 2022-07-12 PROCEDURE — 99233 SBSQ HOSP IP/OBS HIGH 50: CPT | Performed by: STUDENT IN AN ORGANIZED HEALTH CARE EDUCATION/TRAINING PROGRAM

## 2022-07-12 PROCEDURE — 258N000003 HC RX IP 258 OP 636: Performed by: STUDENT IN AN ORGANIZED HEALTH CARE EDUCATION/TRAINING PROGRAM

## 2022-07-12 PROCEDURE — 97165 OT EVAL LOW COMPLEX 30 MIN: CPT | Mod: GO

## 2022-07-12 RX ADMIN — ENOXAPARIN SODIUM 40 MG: 40 INJECTION SUBCUTANEOUS at 17:45

## 2022-07-12 RX ADMIN — PREGABALIN 25 MG: 25 CAPSULE ORAL at 08:24

## 2022-07-12 RX ADMIN — FERROUS GLUCONATE 324 MG: 324 TABLET ORAL at 10:02

## 2022-07-12 RX ADMIN — Medication 1 TABLET: at 10:02

## 2022-07-12 RX ADMIN — Medication 1 TABLET: at 17:42

## 2022-07-12 RX ADMIN — INSULIN ASPART 1 UNITS: 100 INJECTION, SOLUTION INTRAVENOUS; SUBCUTANEOUS at 14:39

## 2022-07-12 RX ADMIN — THIAMINE HYDROCHLORIDE 500 MG: 100 INJECTION, SOLUTION INTRAMUSCULAR; INTRAVENOUS at 16:12

## 2022-07-12 RX ADMIN — LEVOTHYROXINE SODIUM 200 MCG: 100 TABLET ORAL at 08:25

## 2022-07-12 RX ADMIN — FAMOTIDINE 20 MG: 20 TABLET ORAL at 08:24

## 2022-07-12 RX ADMIN — PANCRELIPASE 2 CAPSULE: 24000; 76000; 120000 CAPSULE, DELAYED RELEASE PELLETS ORAL at 08:26

## 2022-07-12 RX ADMIN — RIFAXIMIN 550 MG: 550 TABLET ORAL at 08:25

## 2022-07-12 RX ADMIN — LACTULOSE 20 G: 20 SOLUTION ORAL at 11:12

## 2022-07-12 RX ADMIN — ACETAMINOPHEN 325 MG: 325 TABLET, FILM COATED ORAL at 00:54

## 2022-07-12 RX ADMIN — LACTULOSE 30 G: 20 SOLUTION ORAL at 13:54

## 2022-07-12 RX ADMIN — PANCRELIPASE 2 CAPSULE: 24000; 76000; 120000 CAPSULE, DELAYED RELEASE PELLETS ORAL at 13:53

## 2022-07-12 RX ADMIN — ESCITALOPRAM OXALATE 10 MG: 10 TABLET ORAL at 08:25

## 2022-07-12 RX ADMIN — PANTOPRAZOLE SODIUM 40 MG: 40 TABLET, DELAYED RELEASE ORAL at 08:25

## 2022-07-12 RX ADMIN — ONDANSETRON 4 MG: 4 TABLET, ORALLY DISINTEGRATING ORAL at 00:56

## 2022-07-12 RX ADMIN — SENNOSIDES AND DOCUSATE SODIUM 1 TABLET: 8.6; 5 TABLET ORAL at 10:02

## 2022-07-12 RX ADMIN — FUROSEMIDE 40 MG: 20 TABLET ORAL at 08:25

## 2022-07-12 RX ADMIN — PANCRELIPASE 2 CAPSULE: 24000; 76000; 120000 CAPSULE, DELAYED RELEASE PELLETS ORAL at 17:46

## 2022-07-12 RX ADMIN — LACTULOSE 30 G: 20 SOLUTION ORAL at 08:24

## 2022-07-12 RX ADMIN — FOLIC ACID 1 MG: 1 TABLET ORAL at 21:01

## 2022-07-12 RX ADMIN — THIAMINE HYDROCHLORIDE 500 MG: 100 INJECTION, SOLUTION INTRAMUSCULAR; INTRAVENOUS at 21:01

## 2022-07-12 RX ADMIN — ONDANSETRON 4 MG: 4 TABLET, ORALLY DISINTEGRATING ORAL at 14:55

## 2022-07-12 RX ADMIN — RIFAXIMIN 550 MG: 550 TABLET ORAL at 21:01

## 2022-07-12 RX ADMIN — SPIRONOLACTONE 100 MG: 100 TABLET ORAL at 10:02

## 2022-07-12 RX ADMIN — ATORVASTATIN CALCIUM 20 MG: 20 TABLET, FILM COATED ORAL at 21:01

## 2022-07-12 ASSESSMENT — ACTIVITIES OF DAILY LIVING (ADL)
ADLS_ACUITY_SCORE: 53
ADLS_ACUITY_SCORE: 53
ADLS_ACUITY_SCORE: 55
ADLS_ACUITY_SCORE: 53
ADLS_ACUITY_SCORE: 55
ADLS_ACUITY_SCORE: 55

## 2022-07-12 NOTE — PLAN OF CARE
"/64 (BP Location: Right arm)   Pulse 70   Temp 98.4  F (36.9  C) (Oral)   Resp 16   Ht 1.676 m (5' 6\")   Wt 71.1 kg (156 lb 11.2 oz)   SpO2 100%   BMI 25.29 kg/m      Care from: 2300 - 0730      VS & Pain: VSS ex soft BP. Tylenol given x1 overnight for headache.     Neuro: A&Ox4.    Respiratory: Vitally stable on RA    GI/: Multiple BMs overnight. Voiding WDL.    Nutrition: Regular diet.     Skin: No new skin concerns this shift.    Lines: L piv SL.    Activity: Stand by-assist.    Events this shift: Call light within reach and able to make needs known. Bed alarm on for safety.  overnight.     Goal Outcome Evaluation:    Plan of Care Reviewed With: patient     Overall Patient Progress: no change    Outcome Evaluation: Oriented x4 overnight. .      "

## 2022-07-12 NOTE — PLAN OF CARE
Goal Outcome Evaluation: Pt sleepy but easily aroused and oriented x4. BM x2 this evening (x4 for the day). Up to BR with SBA. Blood glucoses >200's. Sliding scale and CHO coverage administered per orders. Continue to monitor orientation and provide for pt safety. Monitor BG's.     Plan of Care Reviewed With: patient     Overall Patient Progress: no change    Outcome Evaluation: Sleepy but easily aroused and oriented x4.

## 2022-07-12 NOTE — PROGRESS NOTES
[facility]    Medicine Progress Note - Hospitalist Service, GOLD TEAM 9    Date of Admission:  7/9/2022    Assessment & Plan        Altered mental status, improving  Hepatic encephalopathy, likely  Presents with one week of confusion after missing some doses of lactulose. Workup on admission notable for CT head that is unremarkable, mild hyponatremia at 132, ammonia 56, glucose 513 -> 223, stable CBC. No clear e/o infection. Mental status perhaps marginally improved with lactulose administration. Patient denies recent alcohol use. Abd US with patent TIPS, no ascites. Treat for hepatic encephalopathy and see if she clears over next 24-48h.  -Continue lactulose, titrate to 2-3 bowel movements.   -Daily CMP, CBC.   -Treat hyperglycemia; perhaps lability in this has affected mentation as well.   -Monitor for infection, watch culture data.   -Continue diuretics, monitor renal function daily  -Start high dose thiamine    BG lability  DM II  - Endocrinology following, recommendations as below   - Glargine increased to 4 units, 1 unit(s) per 30g cho AC breakfast/lunch/snacks and 1 unit(s) per 45 g cho for dinner and no meal insulin after 2000 hrs     -  Novolog low resistance sliding scale insulin TID AC/HS    Nausea  Concern for gastroparesis  - PRN antiemetics    Chronic  Anemia: Daily CBC.   H/o DVT in arm associated with PICC line: patient not taking apixaban anymore per pharmacy, seems to have completed 3 months treatment earlier this year. No symptoms in arms.   HLD: continue statin.  GERD: continue PPI.  Neuropathy: continue pregabalin.    Hypothyroidism: continue synthroid.      Diet: Combination Diet Regular Diet Adult    DVT Prophylaxis: Enoxaparin  Rossi Catheter: Not present  Central Lines: None  Cardiac Monitoring: None  Code Status: Full Code        The patient's care was discussed with the Bedside Nurse and Patient.    Melissa Parish MD  Hospitalist  "Service, GOLD TEAM 9  [facility]  Securely message with the Vocera Web Console (learn more here)  Text page via [facility] Paging/Directory   Please see signed in provider for up to date coverage information      Clinically Significant Risk Factors Present on Admission                # Hypertension: home medication list includes antihypertensive(s)  # Overweight: Estimated body mass index is 25.29 kg/m  as calculated from the following:    Height as of this encounter: 1.676 m (5' 6\").    Weight as of this encounter: 71.1 kg (156 lb 11.2 oz).        ______________________________________________________________________    Interval History   Nursing notes reviewed. No acute events overnight.    Has stool output.   Denies abd pain.   Still feels tired but less confused. Asleep during PT evaluation.  Nauseated, no vomiting.   4 point ROS negative    Data reviewed today: I reviewed all medications, new labs and imaging results over the last 24 hours. I personally reviewed no images or EKG's today.    Physical Exam   Vital Signs: Temp: 98.4  F (36.9  C) Temp src: Oral BP: 104/64 Pulse: 70   Resp: 16 SpO2: 100 % O2 Device: None (Room air)    Weight: 156 lbs 11.2 oz  Exam  Gen: awake and alert, appears comfortable, appears stated age  HEENT: NCAT, sclerae anicteric  CV: RRR, S1/S2, extremities warm and well perfused, no lower extremity edema  Pulm: normal work of breathing, lungs clear to auscultation, no crackles or wheezing  GI: Nontender, nondistended  Skin: warm, no jaundice  Neuro: Aox3, speech normal, moves all extremities symmetrically and equally  Psych: somewhat distressed from feeling uncomfortable      Data   Recent Labs   Lab 07/12/22  0738 07/12/22  0607 07/12/22  0318 07/11/22  0757 07/11/22  0558 07/10/22  0823 07/10/22  0537 07/09/22  1827 07/09/22  1543   WBC  --  4.0  --   --  3.8*  --  3.8*  --  3.6*   HGB  --  9.9*  --   --  9.2*  --  9.2*  --  10.4*   MCV  --  " 103*  --   --  102*  --  103*  --  104*   PLT  --  85*  --   --  85*  --  80*  --  99*   INR  --   --   --   --  1.73*  --   --   --  1.49*   NA  --  131*  --   --  135*  --  133*  --  132*   POTASSIUM  --  3.6  --   --  3.9  --  3.8  --  3.9   CHLORIDE  --  105  --   --  107  --  106  --  101   CO2  --  22  --   --  22  --  21*  --  22   BUN  --  13.2  --   --  11.6  --  10.2  --  12.2   CR  --  0.84  --   --  0.99*  --  0.83  --  0.97*   ANIONGAP  --  4*  --   --  6*  --  6*  --  9   MAURI  --  8.0*  --   --  7.8*  --  8.1*  --  8.7   * 231* 253*   < > 154*   < > 223*   < > 513*   ALBUMIN  --  2.5*  --   --  2.3*  --  2.4*  --  3.0*   PROTTOTAL  --  5.3*  --   --  5.0*  --  5.1*  --  6.4   BILITOTAL  --  1.5*  --   --  1.5*  --  2.0*  --  2.1*   ALKPHOS  --  119*  --   --  110*  --  109*  --  132*   ALT  --  9*  --   --  9*  --  11  --  14   AST  --  34  --   --  34  --  36*  --  40*   LIPASE  --   --   --   --   --   --   --   --  6*    < > = values in this interval not displayed.

## 2022-07-12 NOTE — CONSULTS
Care Management Initial Consult    General Information  Assessment completed with: Patient,    Type of CM/SW Visit: Initial Assessment    Primary Care Provider verified and updated as needed: Yes   Readmission within the last 30 days: no previous admission in last 30 days      Reason for Consult: discharge planning  Advance Care Planning: Advance Care Planning Reviewed: no concerns identified          Communication Assessment  Patient's communication style: spoken language (English or Bilingual)    Hearing Difficulty or Deaf: no   Wear Glasses or Blind: yes    Cognitive  Cognitive/Neuro/Behavioral: .WDL except  Level of Consciousness: alert  Arousal Level: opens eyes spontaneously  Orientation: disoriented x 4  Mood/Behavior: calm, cooperative  Best Language: 0 - No aphasia  Speech: clear, logical    Living Environment:   People in home: spouse  [place]  Current living Arrangements: house      Able to return to prior arrangements: yes       Family/Social Support:  Care provided by: spouse/significant other, self  Provides care for: no one, unable/limited ability to care for self  Marital Status:             Description of Support System: Supportive, Involved    Support Assessment: Adequate family and caregiver support    Current Resources:   Patient receiving home care services:       Community Resources:    Equipment currently used at home: shower chair  Supplies currently used at home:      Employment/Financial:  Employment Status: disabled        Financial Concerns: No concerns identified      Functional Status:  Prior to admission patient needed assistance: SBA in I/ADLs    Additional Information:  51 year old female with hx of CUETO cirrhosis c/b ascites and HE s/p TIPS (2021) and DM2  Presented to ED with concerns for AMS with initial unremarkable workup except electrolyte derangements. Admitted for further workup and management. Chart review notable for 2/2022 admission with AMS  (hypersomnolence)    CMA completed with Pt at the bedside d/t elevated risk score. RNCC role introduced for safe discharge planning.    Pt confirmed her spouse takes care of her medications, drives her to medical apts. and is a good support person for her. Pt expressed concerns regarding her DM2 diet and carb counting. Her  is a  and works 2 jobs and is unable to come to the hospital to learn additional DM2 education. Pt requesting educational material be provided at [place] so her  can help her at home.     PCP, insurance confirmed with Pt. Pt denies at the time of our visit and new needs at [place]. Care management will continue to follow and support safe discharge planning.     Marie Ramirez RN  5B RNCC  [phone]

## 2022-07-12 NOTE — PROGRESS NOTES
Diabetes Consult Daily  Progress Note          Assessment/Plan:     HPI:  Susan Puckett is a 51 year old female with hx of CUETO cirrhosis c/b ascites and HE s/p TIPS (2021) and DM2  Presented to ED with concerns for AMS with initial unremarkable workup except electrolyte derangements. Admitted for further workup and management. Chart review notable for 2/2022 admission with AMS (hypersomnolence)       Assessment:     1)  Type 2 Diabetes Mellitus; worsening control.  A1c 7.3%  2)  Severe lability with BG control   3)  CUETO cirrhosis c/b cirrhosis  4)  Alt mental status        Plan:       -  Gentle increase to Lantus 4 unit(s) every 24 hours at 1100    -  Novolog meal coverage 1 unit(s) per 30g cho AC breakfast/lunch/snacks and 1 unit(s) per 45 g cho for dinner and no meal insulin after 2000 hrs    -  Novolog low resistance sliding scale insulin TID AC/HS    -  BG monitoring TID AC, HS and 0200    -  Hypoglycemia protocol    -  Recommend carb counting protocol    -  Education :  TBD     -  Outpatient follow up:  recommend [facility] (has hx followed with Dr. Stovall - switching care over to  [facility])      Plan discussed with patient, bedside RN/primary team via this note.           Interval History:     The last 24 hours progress and nursing notes reviewed.      Headache over night    BG trend: far less lability, remains > target.  Will make small, incremental changes to get closer to goal.         Susan is quite fatigued after her shower, had no new questions or concerns at this time.   We reviewed the plan for today and she was agreed      Planned Procedures/surgeries: none  D5W-containing solutions/medications: none    Recent Labs   Lab 07/12/22  0318 07/11/22  2146 07/11/22  1753 07/11/22  1703 07/11/22  1406 07/11/22  1145   * 293* 254* 305* 241* 213*           Nutrition:     Orders Placed This Encounter      Combination Diet Regular Diet Adult        PTA Regimen:  "    Lantus 3 unit(s) in AM  Novolog 2 unit(s) AC breakfast + 1 unit(s) if BG >200, 1 unit(s) AC dinner - per chart      She reports taking Novolog 1 unit(s) for a meal if  - 250  2 unit(s) with a meal if BG >250  Does not take any insulin after 8 PM 2/2 history of lows     BG monitoring frequency:  Wears a CGM - Dexcom           Review of Systems:   CC: Denies other than tired         Medications:   Steroid planning:  no  Tube Feeding: no       Physical Exam:   /64 (BP Location: Right arm)   Pulse 70   Temp 98.4  F (36.9  C) (Oral)   Resp 16   Ht 1.676 m (5' 6\")   Wt 71.1 kg (156 lb 11.2 oz)   SpO2 100%   BMI 25.29 kg/m      General:   A&O, NAD, pleasant, resting comfortably. Well nourished   HEENT:  NC/AT. MMM, EOMI, Anicteric  Lungs:  unremarkable, no new cough, no SOB  ABD:   rounded, soft  Skin:  warm and dry, no obvious lesions/rash/bleeding  Neuro:  No focal neurological deficits  Psych:   Cooperative, appropriate mood, good eye contact, congruent affect          Data:     Lab Results   Component Value Date    A1C 7.3 07/09/2022    A1C 5.1 02/18/2022    A1C 5.6 01/10/2022    A1C 5.4 12/26/2021        CBC RESULTS: Recent Labs   Lab Test 07/12/22  0607   WBC 4.0   RBC 3.00*   HGB 9.9*   HCT 31.0*   *   MCH 33.0   MCHC 31.9   RDW 15.1*   PLT 85*       Recent Labs   Lab Test 07/12/22  0318 07/11/22  2146 07/11/22  0757 07/11/22  0558 07/10/22  0823 07/10/22  0537   NA  --   --   --  135*  --  133*   POTASSIUM  --   --   --  3.9  --  3.8   CHLORIDE  --   --   --  107  --  106   CO2  --   --   --  22  --  21*   ANIONGAP  --   --   --  6*  --  6*   * 293*   < > 154*   < > 223*   BUN  --   --   --  11.6  --  10.2   CR  --   --   --  0.99*  --  0.83   MAURI  --   --   --  7.8*  --  8.1*    < > = values in this interval not displayed.     Liver Function Studies -   Recent Labs   Lab Test 07/11/22  0558   PROTTOTAL 5.0*   ALBUMIN 2.3*   BILITOTAL 1.5*   ALKPHOS 110*   AST 34   ALT 9* "     Lab Results   Component Value Date    INR 1.73 07/11/2022    INR 1.49 07/09/2022    INR 1.4 05/20/2022    INR 1.77 02/16/2022    INR 1.90 02/15/2022    INR 1.85 01/26/2022    INR 1.48 01/14/2022    INR 2.09 01/10/2022    INR 2.01 01/06/2022    INR 1.89 12/28/2021    INR 2.04 12/27/2021    INR 1.50 12/26/2021    INR 2.54 12/13/2021    INR 1.18 07/05/2021    INR 1.07 05/18/2021    INR 1.09 03/19/2021    INR 1.09 01/19/2021    INR 1.2 12/04/2020    INR 1.23 11/25/2020    INR 1.17 11/22/2020    INR 1.23 10/26/2020    INR 1.1 10/19/2020         CAIN Arora CNP   Inpatient Diabetes Management Service  Pager - [phone]  Available on Plink   Friday - Monday 0800 - 1600 hrs    To contact Endocrine Diabetes service:   From 8AM-4PM: page inpatient diabetes provider who is following the patient that day (see filed or incomplete progress notes/consult notes).  If uncertain of provider assignment: page job code [phone]. (To page job code in-house dial 3 stars, [phone] then enter number).  For questions or updates AFTER HOURS from 4PM-8AM: page the diabetes job code for on call fellow: [phone]    Please notify inpatient diabetes service if changes are planned to steroids, nutrition, or if procedures are planned requiring prolonged NPO status.Diabetes Management Team job code: [phone]    I spent a total of 35 minutes on the date of the encounter doing chart review, history and exam, documentation and further activities per the note.  Over 50% of my time on the unit was spent counseling the patient and/or coordinating care regarding acute hyperglycemic management.  See note for details.

## 2022-07-12 NOTE — PROGRESS NOTES
CLINICAL NUTRITION SERVICES - ASSESSMENT NOTE     Nutrition Prescription    RECOMMENDATIONS FOR MDs/PROVIDERS TO ORDER:  1. Altered mentation: patient is confused which likely is due to hepatic encephalopathy, s/p  TIPS indicated by elevated ammonia and not taking lactulose. Also could consider possible Wernicke's given history of nurys-en-y making her more prone to thiamine deficiency along with  CUETO cirrhosis plus report of poor po intake PTA. Could consider a trial of Wernicke protocol with  high thiamine replacement.     2. Patient is not appropriate for inpatient carb counting diet education. Please send referral for outpatient transplant RD appointment.      3. Patient is on pancreatic enzymes, Question /suspect related to nurys-en-Y GB ??, please obtain a recent fecal Elastase test to assess the need for pancreatic enzymes.     4. Patient previously with low fat soluble vitamin lab value. Previously RD recommended Aniya Plus (water miscible fat soluble vitamin) capsule once daily.  Re-check vitamin A, D, E levels in 3 months.  .      Malnutrition Status:    Severe malnutrition in the context of chronic illness    Recommendations already ordered by Registered Dietitian (RD):  Aixa cole 1.0 at 10:00, 2:00 and HS     Future/Additional Recommendations:  PO improvement   Mentation status          REASON FOR ASSESSMENT  Susan Puckett is a/an 52 year old female assessed by the dietitian for Admission Nutrition Risk Screen for positive and RN consult: Pt on transpalnt list with poor appetite,   Also consulted for: patient would like refresher for cho counting    Chart reviewed:  Patient presenting with one week of confusion after missing some doses of lactulose, per MD note, HE likely. It is improving per chart.  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 wears a Dexcom CGM. History of diabetic seizures due to hypoglycemia   -  Pancreatic insufficiency ?? On Creon 24  - Currently on liver transplant list        NUTRITION HISTORY  Obtained from chart review: \" Pt reporting progressive weakness & fatigue over past 1-2 weeks plus brain fog, leading her to feel slightly unsteady at times\". \" Reports loss of appetite. Reports diffuse abdominal pain noting that her abdomen is more distended over the past few days\".    Per chart note, patient has had some hypoglycemic event at night.     CURRENT NUTRITION ORDERS  Diet: Regular    Intake/Tolerance: per discussion with RN today, patient with poor appetite. Eating small meals.   Visited with patient today. Patient was not able to stay awake enough to answer my questions. Patient did report a poor po and appetite PTA for many month, grazing on small meal items and not eating much at all. Patient reports low appetite and nausea.       LABS  Ammonia 56 (7/9)  Glucose 513 -> 223  A1C: 7.3 ( 7/9/22)   Folate, iron and B12 ordered by MD and is pending   Na+: 131 (L)  BUN:13.2, Cr: 0.84  Total bili: 1.5 (H), INR: 1.73 (H)  Glucose: 231 (H)  Quantitative ketones: 0.2 ( normal range)    B1: 163 (2/20/22), B12: 1525 (2/19/22), B6: 55.1 (2/2122) -> all within normal or high   Vitamin A: 0.06, low  ( 1/10/22), vitamin D: 11, low  (5/22), vitamin E: 1.8  Low  (2/20/22)      MEDICATIONS  lactulose, titrate to 2-3 bowel movements.   Diuretics, monitor renal function daily.     ANTHROPOMETRICS  Height: 167.6 cm (5' 6\")  Most Recent Weight: 71.1 kg (156 lb 11.2 oz) driest wt this admit on 7/11/22  IBW: 59 kg ( 121% IbW)  BMI: 25.29 kg /m2   Overweight BMI 25-29.9  Weight History: wt stable within baseline.   Wt Readings from Last 10 Encounters:   07/11/22 71.1 kg (156 lb 11.2 oz)   02/20/22 69.4 kg (152 lb 14.4 oz)   01/24/22 71.9 kg (158 lb 9.6 oz)   01/13/22 81.2 kg (179 lb)   01/06/22 74.8 kg (165 lb)   12/27/21 " 65.4 kg (144 lb 3.2 oz)   12/13/21 71.7 kg (158 lb)   12/09/21 68.9 kg (151 lb 12.8 oz)   11/16/21 74.4 kg (164 lb)   11/06/21 74.5 kg (164 lb 4.8 oz)       Dosing Weight: 62 kg adjusted wt from most recent wt of 71.1 kg ( dry wt) and IBW of 59 kg     ASSESSED NUTRITION NEEDS  Estimated Energy Needs: 1550- 1860 kcals/day (25 - 30 kcals/kg)  Justification: Maintenance, transplant candidacy  Estimated Protein Needs: 74- 93 grams protein/day (1.2 - 1.5 grams of pro/kg)  Justification: Increased needs for post transplant candidacy pending HE   Estimated Fluid Needs: ESLD patient and at risk for volume load   Justification: Per provider pending fluid status    PHYSICAL FINDINGS  No lower extremity edema  Feels tired with brain fog     MALNUTRITION  % Intake: </= 50% for >/= 5 days (severe)  % Weight Loss: None noted  Subcutaneous Fat Loss: Thoracic: moderate   Muscle Loss: Temporal: Mild and Upper leg (quadricep, hamstring): Moderate, upper arm moderate  Fluid Accumulation/Edema: None noted  Malnutrition Diagnosis: Severe malnutrition in the context of chronic illness      NUTRITION DIAGNOSIS  Inadequate oral intake suspect related to AMS/ increased lethargy as evidenced by patient with minimal po intake since admit with increase kcal/protein needs for transplant candidacy     INTERVENTIONS  Implementation  Nutrition Education: Not appropriate at this time due to patient condition. CHO counting consult not appropriate in inpatient setting. Patient would benefit from outpatient teaching.    Medical food supplement therapy       Goals  Patient to consume % of nutritionally adequate meal trays TID, or the equivalent with supplements/snacks.       Monitoring/Evaluation  Progress toward goals will be monitored and evaluated per protocol.      Daniel Aponte RD/ANDRES  Pager [phone]

## 2022-07-12 NOTE — PROGRESS NOTES
07/12/22 0800   Quick Adds   Type of Visit Initial Occupational Therapy Evaluation   Living Environment   People in Home spouse   Current Living Arrangements house   Home Accessibility stairs to enter home   Number of Stairs, Main Entrance 3   Stair Railings, Main Entrance none   Transportation Anticipated family or friend will provide   Living Environment Comments Pt lives in one story home with basement, laundry in basement which  completes. all needs on main floor. Communicates with  when completing shower if he is not home, most often completes one when he is there for safety.   Self-Care   Usual Activity Tolerance moderate   Current Activity Tolerance poor   Equipment Currently Used at Home shower chair   Fall history within last six months no   Activity/Exercise/Self-Care Comment Pt reporting progressive weakness & fatigue over past 1-2 weeks plus brain fog, leading her to feel slightly unsteady at times.   Instrumental Activities of Daily Living (IADL)   Previous Responsibilities laundry;meal prep;housekeeping   IADL Comments pet care,  completes most IADL tasks   General Information   Onset of Illness/Injury or Date of Surgery 07/09/22   Referring Physician Joe Lin MD   Additional Occupational Profile Info/Pertinent History of Current Problem decreased IND with I/ADLs   Existing Precautions/Restrictions fall   Limitations/Impairments safety/cognitive   Left Upper Extremity (Weight-bearing Status) full weight-bearing (FWB)   Right Upper Extremity (Weight-bearing Status) full weight-bearing (FWB)   Left Lower Extremity (Weight-bearing Status) full weight-bearing (FWB)   Right Lower Extremity (Weight-bearing Status) full weight-bearing (FWB)   Heart Disease Risk Factors Medical history   General Observations and Info Pt presents with dec safety due to report of recent brain fog, weakness, fatigue.   Cognitive Status Examination   Orientation Status orientation to person, place and  "time   Cognitive Status Comments Pt reports feeling \"brain-fog\" recently, demonstrates delayed responses to questions, req extra time to think through answers. Appears to have some dec safety awareness, will cont to monitor.   Visual Perception   Visual Impairment/Limitations corrective lenses for reading;corrective lenses full-time   Pain Assessment   Patient Currently in Pain No   Integumentary/Edema   Integumentary/Edema no deficits were identifed   Range of Motion Comprehensive   General Range of Motion no range of motion deficits identified   Strength Comprehensive (MMT)   Comment, General Manual Muscle Testing (MMT) Assessment WFL, reports generalized weakness due to fatigue   Sit-Stand Transfer   Sit-Stand [place] (Transfers) supervision   Toilet Transfer   [place] Level (Toilet Transfer) supervision   Balance   Balance Comments SBA for room ambulation, moving slowly and cautiously, asking about getting a cane from PT   Bathing Assessment/Intervention   [place] Level (Bathing) supervision   Lower Body Dressing Assessment/Training   [place] Level (Lower Body Dressing) supervision   Grooming Assessment/Training   [place] Level (Grooming) supervision   Toileting   [place] Level (Toileting) supervision   OT Discharge Planning   OT Discharge Recommendation (DC Rec) home with assist   OT Rationale for DC Rec Pt below baseline activity tolerance, general weakness, cognition. Likely to progress to complete I/ADL's safely with family assist at home as needed.   OT Brief overview of current status SBA   Total Evaluation Time (Minutes)   Total Evaluation Time (Minutes) 10   OT Goals   Therapy Frequency (OT) 5 times/wk   OT Predicted Duration/Target Date for Goal Attainment 07/26/22   OT Goals Upper Body Dressing;Lower Body Dressing;Toilet Transfer/Toileting;Cognition   OT: Upper Body Dressing Independent;including set-up/clothing retrieval   OT: Lower Body Dressing Independent;including " set-up/clothing retrieval   OT: Toilet Transfer/Toileting Independent;cleaning and garment management   OT: Cognitive Patient/caregiver will verbalize understanding of cognitive assessment results/recommendations as needed for safe discharge planning

## 2022-07-12 NOTE — PLAN OF CARE
Problem: Oral Intake Inadequate  Goal: Improved Oral Intake  Outcome: Ongoing, Progressing  Intervention: Promote and Optimize Oral Intake  Flowsheets (Taken 7/12/2022 153)  Oral Nutrition Promotion: calorie-dense liquids provided   Goal Outcome Evaluation:

## 2022-07-12 NOTE — PLAN OF CARE
"Blood pressure 106/66, pulse 70, temperature 97.4  F (36.3  C), temperature source Oral, resp. rate 16, height 1.676 m (5' 6\"), weight 71.1 kg (156 lb 11.2 oz), SpO2 100 % RA .      Patient A & O X 4, able to make needs known. VSS no respiratory distress noted . Patient sleeping between care . C/o nausea , received Zofran po x 1 PRN, no emesis report and received scheduled medications as ordered .  Received Lactulose scheduled  x 2 and given one extra . Bowel movement x 3 , void adequate. Up with SBA . Appetite fair , , 333, and 302. Insulin given sliding scale and CHO coverage . Left PIV SL. Call light within reach, bed alarm on  , and hourly round completed . Continue monitor closely and update MD with change.            Goal Outcome Evaluation:    Plan of Care Reviewed With: patient    Overall Patient Progress: no change BS range , 200, 333, 302 . Continue monitor and update MD .            "

## 2022-07-13 ENCOUNTER — APPOINTMENT (OUTPATIENT)
Dept: OCCUPATIONAL THERAPY | Facility: CLINIC | Age: 52
DRG: 442 | End: 2022-07-13
Payer: COMMERCIAL

## 2022-07-13 DIAGNOSIS — K74.60 LIVER CIRRHOSIS SECONDARY TO NASH (H): ICD-10-CM

## 2022-07-13 DIAGNOSIS — K75.81 LIVER CIRRHOSIS SECONDARY TO NASH (H): ICD-10-CM

## 2022-07-13 LAB
ALBUMIN SERPL BCG-MCNC: 2.3 G/DL (ref 3.5–5.2)
ALP SERPL-CCNC: 111 U/L (ref 35–104)
ALT SERPL W P-5'-P-CCNC: 10 U/L (ref 10–35)
ANION GAP SERPL CALCULATED.3IONS-SCNC: 6 MMOL/L (ref 7–15)
AST SERPL W P-5'-P-CCNC: 33 U/L (ref 10–35)
BILIRUB SERPL-MCNC: 1.5 MG/DL
BUN SERPL-MCNC: 11.3 MG/DL (ref 6–20)
CALCIUM SERPL-MCNC: 7.9 MG/DL (ref 8.6–10)
CHLORIDE SERPL-SCNC: 106 MMOL/L (ref 98–107)
CREAT SERPL-MCNC: 0.77 MG/DL (ref 0.51–0.95)
DEPRECATED HCO3 PLAS-SCNC: 22 MMOL/L (ref 22–29)
ERYTHROCYTE [DISTWIDTH] IN BLOOD BY AUTOMATED COUNT: 14.9 % (ref 10–15)
GFR SERPL CREATININE-BSD FRML MDRD: >90 ML/MIN/1.73M2
GLUCOSE BLDC GLUCOMTR-MCNC: 166 MG/DL (ref 70–99)
GLUCOSE BLDC GLUCOMTR-MCNC: 201 MG/DL (ref 70–99)
GLUCOSE BLDC GLUCOMTR-MCNC: 231 MG/DL (ref 70–99)
GLUCOSE BLDC GLUCOMTR-MCNC: 262 MG/DL (ref 70–99)
GLUCOSE BLDC GLUCOMTR-MCNC: 278 MG/DL (ref 70–99)
GLUCOSE SERPL-MCNC: 298 MG/DL (ref 70–99)
HCT VFR BLD AUTO: 29.6 % (ref 35–47)
HGB BLD-MCNC: 9.7 G/DL (ref 11.7–15.7)
MCH RBC QN AUTO: 33.7 PG (ref 26.5–33)
MCHC RBC AUTO-ENTMCNC: 32.8 G/DL (ref 31.5–36.5)
MCV RBC AUTO: 103 FL (ref 78–100)
PLATELET # BLD AUTO: 81 10E3/UL (ref 150–450)
POTASSIUM SERPL-SCNC: 3.8 MMOL/L (ref 3.4–5.3)
PROT SERPL-MCNC: 4.9 G/DL (ref 6.4–8.3)
RBC # BLD AUTO: 2.88 10E6/UL (ref 3.8–5.2)
SODIUM SERPL-SCNC: 134 MMOL/L (ref 136–145)
WBC # BLD AUTO: 4 10E3/UL (ref 4–11)

## 2022-07-13 PROCEDURE — 999N000147 HC STATISTIC PT IP EVAL DEFER

## 2022-07-13 PROCEDURE — 250N000013 HC RX MED GY IP 250 OP 250 PS 637

## 2022-07-13 PROCEDURE — 99233 SBSQ HOSP IP/OBS HIGH 50: CPT | Performed by: STUDENT IN AN ORGANIZED HEALTH CARE EDUCATION/TRAINING PROGRAM

## 2022-07-13 PROCEDURE — 250N000011 HC RX IP 250 OP 636

## 2022-07-13 PROCEDURE — 80053 COMPREHEN METABOLIC PANEL: CPT | Performed by: STUDENT IN AN ORGANIZED HEALTH CARE EDUCATION/TRAINING PROGRAM

## 2022-07-13 PROCEDURE — 120N000002 HC R&B MED SURG/OB UMMC

## 2022-07-13 PROCEDURE — 250N000011 HC RX IP 250 OP 636: Performed by: STUDENT IN AN ORGANIZED HEALTH CARE EDUCATION/TRAINING PROGRAM

## 2022-07-13 PROCEDURE — 36415 COLL VENOUS BLD VENIPUNCTURE: CPT | Performed by: STUDENT IN AN ORGANIZED HEALTH CARE EDUCATION/TRAINING PROGRAM

## 2022-07-13 PROCEDURE — 85027 COMPLETE CBC AUTOMATED: CPT | Performed by: STUDENT IN AN ORGANIZED HEALTH CARE EDUCATION/TRAINING PROGRAM

## 2022-07-13 PROCEDURE — 250N000013 HC RX MED GY IP 250 OP 250 PS 637: Performed by: STUDENT IN AN ORGANIZED HEALTH CARE EDUCATION/TRAINING PROGRAM

## 2022-07-13 PROCEDURE — 99233 SBSQ HOSP IP/OBS HIGH 50: CPT | Performed by: NURSE PRACTITIONER

## 2022-07-13 PROCEDURE — 97535 SELF CARE MNGMENT TRAINING: CPT | Mod: GO | Performed by: OCCUPATIONAL THERAPIST

## 2022-07-13 PROCEDURE — 258N000003 HC RX IP 258 OP 636: Performed by: STUDENT IN AN ORGANIZED HEALTH CARE EDUCATION/TRAINING PROGRAM

## 2022-07-13 PROCEDURE — 250N000012 HC RX MED GY IP 250 OP 636 PS 637: Performed by: NURSE PRACTITIONER

## 2022-07-13 RX ORDER — TRAZODONE HYDROCHLORIDE 50 MG/1
50 TABLET, FILM COATED ORAL AT BEDTIME
Status: DISCONTINUED | OUTPATIENT
Start: 2022-07-13 | End: 2022-07-14 | Stop reason: HOSPADM

## 2022-07-13 RX ORDER — LANOLIN ALCOHOL/MO/W.PET/CERES
3 CREAM (GRAM) TOPICAL AT BEDTIME
Status: DISCONTINUED | OUTPATIENT
Start: 2022-07-13 | End: 2022-07-14 | Stop reason: HOSPADM

## 2022-07-13 RX ADMIN — SPIRONOLACTONE 100 MG: 100 TABLET ORAL at 09:10

## 2022-07-13 RX ADMIN — RIFAXIMIN 550 MG: 550 TABLET ORAL at 20:27

## 2022-07-13 RX ADMIN — THIAMINE HYDROCHLORIDE 500 MG: 100 INJECTION, SOLUTION INTRAMUSCULAR; INTRAVENOUS at 14:26

## 2022-07-13 RX ADMIN — FERROUS GLUCONATE 324 MG: 324 TABLET ORAL at 09:10

## 2022-07-13 RX ADMIN — FOLIC ACID 1 MG: 1 TABLET ORAL at 20:27

## 2022-07-13 RX ADMIN — LACTULOSE 30 G: 20 SOLUTION ORAL at 09:08

## 2022-07-13 RX ADMIN — INSULIN ASPART 2 UNITS: 100 INJECTION, SOLUTION INTRAVENOUS; SUBCUTANEOUS at 15:49

## 2022-07-13 RX ADMIN — PANCRELIPASE 2 CAPSULE: 24000; 76000; 120000 CAPSULE, DELAYED RELEASE PELLETS ORAL at 14:28

## 2022-07-13 RX ADMIN — ATORVASTATIN CALCIUM 20 MG: 20 TABLET, FILM COATED ORAL at 20:27

## 2022-07-13 RX ADMIN — SENNOSIDES AND DOCUSATE SODIUM 1 TABLET: 8.6; 5 TABLET ORAL at 09:10

## 2022-07-13 RX ADMIN — PANTOPRAZOLE SODIUM 40 MG: 40 TABLET, DELAYED RELEASE ORAL at 09:10

## 2022-07-13 RX ADMIN — FAMOTIDINE 20 MG: 20 TABLET ORAL at 09:10

## 2022-07-13 RX ADMIN — SENNOSIDES AND DOCUSATE SODIUM 2 TABLET: 8.6; 5 TABLET ORAL at 20:27

## 2022-07-13 RX ADMIN — PREGABALIN 25 MG: 25 CAPSULE ORAL at 09:10

## 2022-07-13 RX ADMIN — LEVOTHYROXINE SODIUM 200 MCG: 100 TABLET ORAL at 09:10

## 2022-07-13 RX ADMIN — PANCRELIPASE 2 CAPSULE: 24000; 76000; 120000 CAPSULE, DELAYED RELEASE PELLETS ORAL at 18:30

## 2022-07-13 RX ADMIN — Medication 1 TABLET: at 09:10

## 2022-07-13 RX ADMIN — THIAMINE HYDROCHLORIDE 500 MG: 100 INJECTION, SOLUTION INTRAMUSCULAR; INTRAVENOUS at 20:44

## 2022-07-13 RX ADMIN — ENOXAPARIN SODIUM 40 MG: 40 INJECTION SUBCUTANEOUS at 18:31

## 2022-07-13 RX ADMIN — THIAMINE HYDROCHLORIDE 500 MG: 100 INJECTION, SOLUTION INTRAMUSCULAR; INTRAVENOUS at 09:19

## 2022-07-13 RX ADMIN — FUROSEMIDE 40 MG: 20 TABLET ORAL at 09:10

## 2022-07-13 RX ADMIN — ONDANSETRON 4 MG: 4 TABLET, ORALLY DISINTEGRATING ORAL at 11:22

## 2022-07-13 RX ADMIN — PANCRELIPASE 2 CAPSULE: 24000; 76000; 120000 CAPSULE, DELAYED RELEASE PELLETS ORAL at 10:37

## 2022-07-13 RX ADMIN — LACTULOSE 30 G: 20 SOLUTION ORAL at 20:26

## 2022-07-13 RX ADMIN — LACTULOSE 30 G: 20 SOLUTION ORAL at 14:30

## 2022-07-13 RX ADMIN — TRAZODONE HYDROCHLORIDE 50 MG: 50 TABLET ORAL at 22:23

## 2022-07-13 RX ADMIN — ESCITALOPRAM OXALATE 10 MG: 10 TABLET ORAL at 09:17

## 2022-07-13 RX ADMIN — RIFAXIMIN 550 MG: 550 TABLET ORAL at 09:10

## 2022-07-13 RX ADMIN — Medication 3 MG: at 22:23

## 2022-07-13 RX ADMIN — ACETAMINOPHEN 325 MG: 325 TABLET, FILM COATED ORAL at 04:48

## 2022-07-13 RX ADMIN — Medication 1 TABLET: at 18:30

## 2022-07-13 ASSESSMENT — ACTIVITIES OF DAILY LIVING (ADL)
ADLS_ACUITY_SCORE: 55

## 2022-07-13 NOTE — PLAN OF CARE
"Blood pressure 95/57, pulse 67, temperature 98.6  F (37  C), temperature source Oral, resp. rate 15, height 1.676 m (5' 6\"), weight 73.2 kg (161 lb 6 oz), SpO2 100 %, RA.        Patient A & O X 4, able to make needs known. VSS denies pain . C/o nausea and received Zofran x 1 . Post assessment with relief. Received scheduled medications as ordered. Appetite fair , , 166 insulin given per order. Patient up with SBA , void adequate and had bowel movement x  2 .  Left PIV SL. Call light within reach, hourly round completed . Continue with POC and update MD with change.           Goal Outcome Evaluation:    Plan of Care Reviewed With: patient     Overall Patient Progress: improving. Patient more awake .( NM tech is requesting Pt to be  NPO after MN . For  NM Gastric Emptying at 0800 tomorrow  7/14/22.          "

## 2022-07-13 NOTE — PLAN OF CARE
"BP 96/53 (BP Location: Right arm)   Pulse 68   Temp 98  F (36.7  C) (Oral)   Resp 16   Ht 1.676 m (5' 6\")   Wt 73.2 kg (161 lb 6 oz)   SpO2 100%   BMI 26.05 kg/m       Time: 9170-4811    Reason for admission: Hyperglycemia/comfusion.   Activity: SBA  Pain: C/o headache, received PRN Tylenol, and it was effective.   Neuro: Alert and oriented.   Cardiac: WDL  Resp: Denied SOB, lung sounds clear bilaterally.   GI/: Regular diet, voids without difficulties, bowel sounds present x four quadrants.   Lines: L PIV saline locked.   Skin: WDL  Labs/Imaging: BG at 0200, 226.    New changes to shift: No change.     Plan: Continue with current plan of cares.                       "

## 2022-07-13 NOTE — PLAN OF CARE
PT 5B: cancel/defer    PT order received. Per chart review, discussion with OT and observation of pt in hallway, pt mobilizing SBA and steady on feet. No IP PT needs at this time. Will complete orders and sign off. Anticipate return to home with assist as needed, please see OT care plan for further discharge recommendations. PT to complete orders and sign off.

## 2022-07-13 NOTE — PROGRESS NOTES
Care Management Follow Up    Length of Stay (days): 4    Expected Discharge Date: 07/14/2022     Concerns to be Addressed: all concerns addressed in this encounter     Patient plan of care discussed at interdisciplinary rounds: Yes    Anticipated Discharge Disposition: Home     Anticipated Discharge Services: None  Anticipated Discharge DME:  (cane)    Patient/family educated on Medicare website which has current facility and service quality ratings:    Education Provided on the Discharge Plan:    Patient/Family in Agreement with the Plan: yes    Referrals Placed by CM/SW:    Private pay costs discussed: Not applicable    Additional Information:  Pt expressed concerns regarding carb counting education and was unable to teach back how to carb count at home. RNCC spoke with provider and a  referral was placed to help with medication management and teaching for DM2.    [facility] Care accepted and orders have been placed.    Care management will continue to follow and support safe discharge planning..      Marie Ramirez RN  5B RNCC  [phone]

## 2022-07-13 NOTE — PROGRESS NOTES
Diabetes Consult Daily  Progress Note          Assessment/Plan:     HPI:  Susan Puckett is a 51 year old female with hx of CUETO cirrhosis c/b ascites and HE s/p TIPS (2021) and DM2  Presented to ED with concerns for AMS with initial unremarkable workup except electrolyte derangements. Admitted for further workup and management. Chart review notable for 2/2022 admission with AMS (hypersomnolence)       Assessment:     1)  Type 2 Diabetes Mellitus; worsening control.  A1c 7.3%  2)  Severe lability with BG control   3)  CUETO cirrhosis c/b cirrhosis  4)  Alt mental status     Plan:       -  Gentle increase to Lantus 6 unit(s) every 24 hours at 0800    -  Increase Novolog meal coverage 1 unit(s) per 25g cho AC breakfast/lunch/snacks and 1 unit(s) per 40 g cho for dinner and no meal insulin after 2000 hrs.  May want to  Consider Echo pen for 0.5 unit(s) increments for insulin administration.     -  Gentle increase Novolog low resistance sliding scale insulin to custom 1/75 TID AC/HS AND 0200    -  BG monitoring TID AC, HS and 0200    -  Hypoglycemia protocol    -  Recommend carb counting protocol    -  Education :  TBD     -  Outpatient follow up:  recommend [facility] (has hx followed with Dr. Stovall - switching care over to  [facility])    Plan discussed with patient, bedside RN/primary team via this note.           Interval History:     The last 24 hours progress and nursing notes reviewed.      Susan and  expressed desire for more education for cho counting as this has been an issue at home.  Consult placed and 2/2 Susan's profound fatigue, teaching not appropriate.  ** Please see the very detailed noted provided by Daniel Aponte RD on 7/12.   Much appreciated.     BG trend:  Still remains > target.  Hesitant to make too big of changes with her hx of lability and severe hypoglycemia tendencies.  Again today will make small incremental changes.     Yesterday, her BG at noon was  "checked < hours after eating her AM meal resulting in postprandial read.  She continued to trend on high end remainder of evening/noc.  Fasting BG is reassuring a bump in Lantus again this AM will be appropriate to trial.     Please conintue to monitor for unreported snacking, given her insulin sensitivity, will experience precipitious drop in BG if given too much insulin in a postprandial reading.          More alert today, actively participating in conversation and staying alert for the entirety of the interaction.    We review the plan.  She shared that last night she did eat some snacks.   She didn't think that her BG was checked before.   She did call the RN to inform.  So we are making good progress with the communication.     Will continue with no coverage of food after 2000 but will continue with sliding scale insulin at HS and will add an 0200 - PLEASE watch for PO intake PRIOR to the BG at 0200 as to not provide too much sliding scale insulin based on a post prandial reading.     Planned Procedures/surgeries: none  D5W-containing solutions/medications: none    Recent Labs   Lab 07/13/22  0548 07/13/22  0218 07/12/22  2205 07/12/22  1724 07/12/22  1226 07/12/22  0738   * 262* 240* 302* 333* 200*           Nutrition:     Orders Placed This Encounter      Combination Diet Regular Diet Adult        PTA Regimen:     Lantus 3 unit(s) in AM  Novolog 2 unit(s) AC breakfast + 1 unit(s) if BG >200, 1 unit(s) AC dinner - per chart      She reports taking Novolog 1 unit(s) for a meal if  - 250  2 unit(s) with a meal if BG >250  Does not take any insulin after 8 PM 2/2 history of lows     BG monitoring frequency:  Wears a CGM - Dexcom           Review of Systems:   CC: Denies other than tired         Medications:   Steroid planning:  no  Tube Feeding: no       Physical Exam:   BP 92/57 (BP Location: Right arm)   Pulse 69   Temp 98.3  F (36.8  C) (Oral)   Resp 16   Ht 1.676 m (5' 6\")   Wt 73.2 kg (161 " lb 6 oz)   SpO2 97%   BMI 26.05 kg/m      General:   A&O, NAD, pleasant, resting comfortably. Well nourished   HEENT:  NC/AT. MMM, EOMI, Anicteric  Lungs:  unremarkable, no new cough, no SOB  ABD:   rounded, soft  Skin:  warm and dry, no obvious lesions/rash/bleeding  Neuro:  No focal neurological deficits  Psych:   Cooperative, appropriate mood, good eye contact, congruent affect          Data:     Lab Results   Component Value Date    A1C 7.3 07/09/2022    A1C 5.1 02/18/2022    A1C 5.6 01/10/2022    A1C 5.4 12/26/2021        CBC RESULTS: Recent Labs   Lab Test 07/13/22  0548   WBC 4.0   RBC 2.88*   HGB 9.7*   HCT 29.6*   *   MCH 33.7*   MCHC 32.8   RDW 14.9   PLT 81*     Recent Labs   Lab Test 07/13/22  0824 07/13/22  0548 07/12/22  0738 07/12/22  0607   NA  --  134*  --  131*   POTASSIUM  --  3.8  --  3.6   CHLORIDE  --  106  --  105   CO2  --  22  --  22   ANIONGAP  --  6*  --  4*   * 298*   < > 231*   BUN  --  11.3  --  13.2   CR  --  0.77  --  0.84   MAURI  --  7.9*  --  8.0*    < > = values in this interval not displayed.     Liver Function Studies -   Recent Labs   Lab Test 07/11/22  0558   PROTTOTAL 5.0*   ALBUMIN 2.3*   BILITOTAL 1.5*   ALKPHOS 110*   AST 34   ALT 9*     Lab Results   Component Value Date    INR 1.73 07/11/2022    INR 1.49 07/09/2022    INR 1.4 05/20/2022    INR 1.77 02/16/2022    INR 1.90 02/15/2022    INR 1.85 01/26/2022    INR 1.48 01/14/2022    INR 2.09 01/10/2022    INR 2.01 01/06/2022    INR 1.89 12/28/2021    INR 2.04 12/27/2021    INR 1.50 12/26/2021    INR 2.54 12/13/2021    INR 1.18 07/05/2021    INR 1.07 05/18/2021    INR 1.09 03/19/2021    INR 1.09 01/19/2021    INR 1.2 12/04/2020    INR 1.23 11/25/2020    INR 1.17 11/22/2020    INR 1.23 10/26/2020    INR 1.1 10/19/2020     CAIN Arora CNP   Inpatient Diabetes Management Service  Pager - [phone]  Available on [facility]   Friday - Monday 0800 - 1600 hrs    To contact Endocrine Diabetes service:   From  8AM-4PM: page inpatient diabetes provider who is following the patient that day (see filed or incomplete progress notes/consult notes).  If uncertain of provider assignment: page job code [id number]. (To page job code in-house dial 3 stars, [phone] then enter number).  For questions or updates AFTER HOURS from 4PM-8AM: page the diabetes job code for on call fellow: [phone]    Please notify inpatient diabetes service if changes are planned to steroids, nutrition, or if procedures are planned requiring prolonged NPO status.Diabetes Management Team job code: [phone]    I spent a total of 35 minutes on the date of the encounter doing chart review, history and exam, documentation and further activities per the note.  Over 50% of my time on the unit was spent counseling the patient and/or coordinating care regarding acute hyperglycemic management.  See note for details.

## 2022-07-13 NOTE — PLAN OF CARE
"Goal Outcome Evaluation:    Time: 1900-2330    /66 (BP Location: Right arm)   Pulse 70   Temp 97.4  F (36.3  C) (Oral)   Resp 16   Ht 1.676 m (5' 6\")   Wt 71.1 kg (156 lb 11.2 oz)   SpO2 100%   BMI 25.29 kg/m      Reason for admission: Confusion [R41.0]  Hyperglycemia [R73.9]  Liver cirrhosis secondary to CUETO (H) [K75.81, K74.60]    New this shift: Pt A&O, VSS on RA. Reports feeling less confused, but tired. Pleasant. Up ad adriano. Held lactulose tonight d/t >3 stools today. Monitoring BGs and covering per MAR. Denies pain, nausea, SOB. Started thiamine infusions today.     Plan: Monitor confusion. Monitor BGs.       "

## 2022-07-14 ENCOUNTER — APPOINTMENT (OUTPATIENT)
Dept: NUCLEAR MEDICINE | Facility: CLINIC | Age: 52
DRG: 442 | End: 2022-07-14
Attending: STUDENT IN AN ORGANIZED HEALTH CARE EDUCATION/TRAINING PROGRAM
Payer: COMMERCIAL

## 2022-07-14 ENCOUNTER — APPOINTMENT (OUTPATIENT)
Dept: OCCUPATIONAL THERAPY | Facility: CLINIC | Age: 52
DRG: 442 | End: 2022-07-14
Payer: COMMERCIAL

## 2022-07-14 VITALS
DIASTOLIC BLOOD PRESSURE: 61 MMHG | WEIGHT: 167.3 LBS | RESPIRATION RATE: 20 BRPM | HEIGHT: 66 IN | BODY MASS INDEX: 26.89 KG/M2 | OXYGEN SATURATION: 96 % | SYSTOLIC BLOOD PRESSURE: 102 MMHG | TEMPERATURE: 98.7 F | HEART RATE: 68 BPM

## 2022-07-14 LAB
ALBUMIN SERPL BCG-MCNC: 2.2 G/DL (ref 3.5–5.2)
ALP SERPL-CCNC: 103 U/L (ref 35–104)
ALT SERPL W P-5'-P-CCNC: 9 U/L (ref 10–35)
ANION GAP SERPL CALCULATED.3IONS-SCNC: 6 MMOL/L (ref 7–15)
AST SERPL W P-5'-P-CCNC: 40 U/L (ref 10–35)
BILIRUB SERPL-MCNC: 1.4 MG/DL
BUN SERPL-MCNC: 13.2 MG/DL (ref 6–20)
CALCIUM SERPL-MCNC: 7.9 MG/DL (ref 8.6–10)
CHLORIDE SERPL-SCNC: 108 MMOL/L (ref 98–107)
CREAT SERPL-MCNC: 0.9 MG/DL (ref 0.51–0.95)
DEPRECATED HCO3 PLAS-SCNC: 23 MMOL/L (ref 22–29)
ERYTHROCYTE [DISTWIDTH] IN BLOOD BY AUTOMATED COUNT: 15 % (ref 10–15)
GFR SERPL CREATININE-BSD FRML MDRD: 78 ML/MIN/1.73M2
GLUCOSE BLDC GLUCOMTR-MCNC: 194 MG/DL (ref 70–99)
GLUCOSE BLDC GLUCOMTR-MCNC: 203 MG/DL (ref 70–99)
GLUCOSE BLDC GLUCOMTR-MCNC: 209 MG/DL (ref 70–99)
GLUCOSE BLDC GLUCOMTR-MCNC: 277 MG/DL (ref 70–99)
GLUCOSE SERPL-MCNC: 150 MG/DL (ref 70–99)
HCT VFR BLD AUTO: 28.1 % (ref 35–47)
HGB BLD-MCNC: 9.2 G/DL (ref 11.7–15.7)
MCH RBC QN AUTO: 33.1 PG (ref 26.5–33)
MCHC RBC AUTO-ENTMCNC: 32.7 G/DL (ref 31.5–36.5)
MCV RBC AUTO: 101 FL (ref 78–100)
PLATELET # BLD AUTO: 85 10E3/UL (ref 150–450)
POTASSIUM SERPL-SCNC: 3.9 MMOL/L (ref 3.4–5.3)
PROT SERPL-MCNC: 5 G/DL (ref 6.4–8.3)
RBC # BLD AUTO: 2.78 10E6/UL (ref 3.8–5.2)
SODIUM SERPL-SCNC: 137 MMOL/L (ref 136–145)
WBC # BLD AUTO: 4 10E3/UL (ref 4–11)

## 2022-07-14 PROCEDURE — 250N000013 HC RX MED GY IP 250 OP 250 PS 637: Performed by: STUDENT IN AN ORGANIZED HEALTH CARE EDUCATION/TRAINING PROGRAM

## 2022-07-14 PROCEDURE — 99233 SBSQ HOSP IP/OBS HIGH 50: CPT | Performed by: NURSE PRACTITIONER

## 2022-07-14 PROCEDURE — 82653 EL-1 FECAL QUANTITATIVE: CPT | Performed by: STUDENT IN AN ORGANIZED HEALTH CARE EDUCATION/TRAINING PROGRAM

## 2022-07-14 PROCEDURE — 85027 COMPLETE CBC AUTOMATED: CPT | Performed by: STUDENT IN AN ORGANIZED HEALTH CARE EDUCATION/TRAINING PROGRAM

## 2022-07-14 PROCEDURE — 250N000012 HC RX MED GY IP 250 OP 636 PS 637: Performed by: NURSE PRACTITIONER

## 2022-07-14 PROCEDURE — 99239 HOSP IP/OBS DSCHRG MGMT >30: CPT | Performed by: STUDENT IN AN ORGANIZED HEALTH CARE EDUCATION/TRAINING PROGRAM

## 2022-07-14 PROCEDURE — A9541 TC99M SULFUR COLLOID: HCPCS | Performed by: STUDENT IN AN ORGANIZED HEALTH CARE EDUCATION/TRAINING PROGRAM

## 2022-07-14 PROCEDURE — 250N000011 HC RX IP 250 OP 636: Performed by: STUDENT IN AN ORGANIZED HEALTH CARE EDUCATION/TRAINING PROGRAM

## 2022-07-14 PROCEDURE — 97110 THERAPEUTIC EXERCISES: CPT | Mod: GO

## 2022-07-14 PROCEDURE — 343N000001 HC RX 343: Performed by: STUDENT IN AN ORGANIZED HEALTH CARE EDUCATION/TRAINING PROGRAM

## 2022-07-14 PROCEDURE — 78264 GASTRIC EMPTYING IMG STUDY: CPT

## 2022-07-14 PROCEDURE — 36415 COLL VENOUS BLD VENIPUNCTURE: CPT | Performed by: STUDENT IN AN ORGANIZED HEALTH CARE EDUCATION/TRAINING PROGRAM

## 2022-07-14 PROCEDURE — 80053 COMPREHEN METABOLIC PANEL: CPT | Performed by: STUDENT IN AN ORGANIZED HEALTH CARE EDUCATION/TRAINING PROGRAM

## 2022-07-14 PROCEDURE — 78264 GASTRIC EMPTYING IMG STUDY: CPT | Mod: 26 | Performed by: RADIOLOGY

## 2022-07-14 PROCEDURE — 258N000003 HC RX IP 258 OP 636: Performed by: STUDENT IN AN ORGANIZED HEALTH CARE EDUCATION/TRAINING PROGRAM

## 2022-07-14 PROCEDURE — 97129 THER IVNTJ 1ST 15 MIN: CPT | Mod: GO

## 2022-07-14 PROCEDURE — 250N000013 HC RX MED GY IP 250 OP 250 PS 637

## 2022-07-14 RX ORDER — FERROUS GLUCONATE 324(38)MG
324 TABLET ORAL DAILY
Qty: 30 TABLET | Refills: 0 | Status: SHIPPED | OUTPATIENT
Start: 2022-07-14 | End: 2022-08-13

## 2022-07-14 RX ORDER — LANOLIN ALCOHOL/MO/W.PET/CERES
100 CREAM (GRAM) TOPICAL DAILY
Qty: 30 TABLET | Refills: 0 | Status: SHIPPED | OUTPATIENT
Start: 2022-07-20 | End: 2022-08-19

## 2022-07-14 RX ORDER — ACETAMINOPHEN 325 MG/1
325 TABLET ORAL EVERY 4 HOURS PRN
Status: ON HOLD | COMMUNITY
Start: 2022-07-14 | End: 2022-11-10

## 2022-07-14 RX ORDER — LACTULOSE 10 G/15ML
20 SOLUTION ORAL 2 TIMES DAILY PRN
Qty: 1800 ML | Refills: 0 | Status: SHIPPED | OUTPATIENT
Start: 2022-07-14 | End: 2022-08-13

## 2022-07-14 RX ORDER — LACTULOSE 10 G/15ML
30 SOLUTION ORAL 3 TIMES DAILY
COMMUNITY
Start: 2022-07-14 | End: 2022-08-24

## 2022-07-14 RX ORDER — INSULIN ADMIN. SUPPLIES
1 INSULIN PEN (EA) SUBCUTANEOUS ONCE
Qty: 1 EACH | Refills: 0 | Status: SHIPPED | OUTPATIENT
Start: 2022-07-14 | End: 2022-07-14

## 2022-07-14 RX ADMIN — FUROSEMIDE 40 MG: 20 TABLET ORAL at 13:43

## 2022-07-14 RX ADMIN — Medication 1 TABLET: at 18:37

## 2022-07-14 RX ADMIN — SPIRONOLACTONE 100 MG: 100 TABLET ORAL at 13:43

## 2022-07-14 RX ADMIN — PANCRELIPASE 2 CAPSULE: 24000; 76000; 120000 CAPSULE, DELAYED RELEASE PELLETS ORAL at 15:21

## 2022-07-14 RX ADMIN — ESCITALOPRAM OXALATE 10 MG: 10 TABLET ORAL at 10:18

## 2022-07-14 RX ADMIN — PANTOPRAZOLE SODIUM 40 MG: 40 TABLET, DELAYED RELEASE ORAL at 10:18

## 2022-07-14 RX ADMIN — RIFAXIMIN 550 MG: 550 TABLET ORAL at 10:17

## 2022-07-14 RX ADMIN — Medication 2 MCI.: at 08:05

## 2022-07-14 RX ADMIN — LEVOTHYROXINE SODIUM 200 MCG: 100 TABLET ORAL at 10:17

## 2022-07-14 RX ADMIN — FAMOTIDINE 20 MG: 20 TABLET ORAL at 10:18

## 2022-07-14 RX ADMIN — INSULIN GLARGINE 7 UNITS: 100 INJECTION, SOLUTION SUBCUTANEOUS at 13:47

## 2022-07-14 RX ADMIN — PANCRELIPASE 2 CAPSULE: 24000; 76000; 120000 CAPSULE, DELAYED RELEASE PELLETS ORAL at 18:31

## 2022-07-14 RX ADMIN — LACTULOSE 30 G: 20 SOLUTION ORAL at 13:44

## 2022-07-14 RX ADMIN — THIAMINE HYDROCHLORIDE 500 MG: 100 INJECTION, SOLUTION INTRAMUSCULAR; INTRAVENOUS at 13:44

## 2022-07-14 RX ADMIN — PREGABALIN 25 MG: 25 CAPSULE ORAL at 10:18

## 2022-07-14 RX ADMIN — INSULIN ASPART 1.9 UNITS: 100 INJECTION, SOLUTION INTRAVENOUS; SUBCUTANEOUS at 18:33

## 2022-07-14 RX ADMIN — FERROUS GLUCONATE 324 MG: 324 TABLET ORAL at 10:18

## 2022-07-14 ASSESSMENT — ACTIVITIES OF DAILY LIVING (ADL)
ADLS_ACUITY_SCORE: 55

## 2022-07-14 NOTE — PROGRESS NOTES
Diabetes Consult Daily  Progress Note          Assessment/Plan:     HPI:  Susan Puckett is a 51 year old female with hx of CUETO cirrhosis c/b ascites and HE s/p TIPS (2021) and DM2  Presented to ED with concerns for AMS with initial unremarkable workup except electrolyte derangements. Admitted for further workup and management. Chart review notable for 2/2022 admission with AMS (hypersomnolence)       Assessment:     1)  Type 2 Diabetes Mellitus; worsening control.  A1c 7.3%  2)  Severe lability with BG control   3)  CUETO cirrhosis c/b cirrhosis  4)  Alt mental status     Plan:       -  Gentle increase to Lantus 7 unit(s) every 24 hours at 0800 --> re-timed to 1300 since NPO this AM - needs to wait to eat following the emptying study    -  Change to ECHO PEN for Novolog meal coverage 0.5 unit(s) per 14g cho AC breakfast/lunch and 0.5 unit(s) per 20 g cho for dinner and from  and no meal insulin after 2000 hrs.   Snack coverage:   0800 - 1700 - 0.5 unit(s) per 14 g cho and 1701 - 2000 0.5 unit(s) per 20 g cho      -  Novolog Custom 1/75 TID AC/HS AND 0200    -  BG monitoring TID AC, HS and 0200    -  Hypoglycemia protocol    -  Recommend carb counting protocol    -  Education :  TBD     -  Outpatient follow up:  recommend [facility] (has hx followed with Dr. Stovall - switching care over to  [facility])    Plan discussed with patient, bedside RN/primary team via this note.           Interval History:     The last 24 hours progress and nursing notes reviewed.      NPO after midnight for a procedure this AM - Gl[place]ine re-timed for 1300 when she can resume PO intake.    BG trend:  Very stable, no lows, persisting just > target.         Discussed changing to ECHO pen for 0.5 increments for insulin and she agreed.  Discussed with pharm who will order.  She shares her Endo actually just switched over to Echo pen for her next refill.       Will continue with no coverage of food after 2000 but  "will continue with sliding scale insulin at HS and will add an 0200 - PLEASE watch for PO intake PRIOR to the BG at 0200 as to not provide too much sliding scale insulin based on a post prandial reading.       Carry over documentation:  Susan and  expressed desire for more education for cho counting as this has been an issue at home.  Consult placed and 2/2 Susan's profound fatigue, teaching not appropriate.  ** Please see the very detailed noted provided by Daniel Aponte RD on 7/12.   Much appreciated.     Planned Procedures/surgeries: none  D5W-containing solutions/medications: none    Recent Labs   Lab 07/14/22  0505 07/14/22  0243 07/13/22  2159 07/13/22  1809 07/13/22  1327 07/13/22  0824   * 209* 278* 201* 166* 231*           Nutrition:     Orders Placed This Encounter      Combination Diet Six Small Feedings Adult        PTA Regimen:     Lantus 3 unit(s) in AM  Novolog 2 unit(s) AC breakfast + 1 unit(s) if BG >200, 1 unit(s) AC dinner - per chart      She reports taking Novolog 1 unit(s) for a meal if  - 250  2 unit(s) with a meal if BG >250  Does not take any insulin after 8 PM 2/2 history of lows     BG monitoring frequency:  Wears a CGM - Dexcom           Review of Systems:   CC: Denies other than tired but less tired that she has been.         Medications:   Steroid planning:  no  Tube Feeding: no       Physical Exam:   /63 (BP Location: Right arm)   Pulse 78   Temp 97.4  F (36.3  C) (Oral)   Resp 16   Ht 1.676 m (5' 6\")   Wt 74.7 kg (164 lb 9.6 oz)   SpO2 100%   BMI 26.57 kg/m      General:   A&O, NAD, pleasant, resting comfortably. Well nourished   HEENT:  NC/AT. MMM, EOMI, Anicteric  Lungs:  unremarkable, no new cough, no SOB  ABD:   rounded, soft  Skin:  warm and dry, no obvious lesions/rash/bleeding  Neuro:  No focal neurological deficits  Psych:   Cooperative, appropriate mood, good eye contact, congruent affect          Data:     Lab Results   Component Value Date    " A1C 7.3 07/09/2022    A1C 5.1 02/18/2022    A1C 5.6 01/10/2022    A1C 5.4 12/26/2021        CBC RESULTS: Recent Labs   Lab Test 07/14/22  0505   WBC 4.0   RBC 2.78*   HGB 9.2*   HCT 28.1*   *   MCH 33.1*   MCHC 32.7   RDW 15.0   PLT 85*     Recent Labs   Lab Test 07/14/22  1016 07/14/22  0505 07/13/22  0824 07/13/22  0548   NA  --  137  --  134*   POTASSIUM  --  3.9  --  3.8   CHLORIDE  --  108*  --  106   CO2  --  23  --  22   ANIONGAP  --  6*  --  6*   * 150*   < > 298*   BUN  --  13.2  --  11.3   CR  --  0.90  --  0.77   MAURI  --  7.9*  --  7.9*    < > = values in this interval not displayed.       Liver Function Studies -   Recent Labs   Lab Test 07/11/22  0558   PROTTOTAL 5.0*   ALBUMIN 2.3*   BILITOTAL 1.5*   ALKPHOS 110*   AST 34   ALT 9*     Lab Results   Component Value Date    INR 1.73 07/11/2022    INR 1.49 07/09/2022    INR 1.4 05/20/2022    INR 1.77 02/16/2022    INR 1.90 02/15/2022    INR 1.85 01/26/2022    INR 1.48 01/14/2022    INR 2.09 01/10/2022    INR 2.01 01/06/2022    INR 1.89 12/28/2021    INR 2.04 12/27/2021    INR 1.50 12/26/2021    INR 2.54 12/13/2021    INR 1.18 07/05/2021    INR 1.07 05/18/2021    INR 1.09 03/19/2021    INR 1.09 01/19/2021    INR 1.2 12/04/2020    INR 1.23 11/25/2020    INR 1.17 11/22/2020    INR 1.23 10/26/2020    INR 1.1 10/19/2020     CAIN Arora CNP   Inpatient Diabetes Management Service  Pager - [phone]  Available on [facility]   Friday - Monday 0800 - 1600 hrs    To contact Endocrine Diabetes service:   From 8AM-4PM: page inpatient diabetes provider who is following the patient that day (see filed or incomplete progress notes/consult notes).  If uncertain of provider assignment: page job code [phone]. (To page job code in-house dial 3 stars, 777 then enter number).  For questions or updates AFTER HOURS from 4PM-8AM: page the diabetes job code for on call fellow: [phone]    Please notify inpatient diabetes service if changes are planned to  steroids, nutrition, or if procedures are planned requiring prolonged NPO status.Diabetes Management Team job code: [phone]    I spent a total of 35 minutes on the date of the encounter doing chart review, history and exam, documentation and further activities per the note.  Over 50% of my time on the unit was spent counseling the patient and/or coordinating care regarding acute hyperglycemic management.  See note for details.

## 2022-07-14 NOTE — PROGRESS NOTES
[facility]    Medicine Progress Note - Hospitalist Service, GOLD TEAM 9    Date of Admission:  7/9/2022    Assessment & Plan        Altered mental status, improving  Hepatic encephalopathy, likely  Presents with one week of confusion after missing some doses of lactulose. Workup on admission notable for CT head that is unremarkable, mild hyponatremia at 132, ammonia 56, glucose 513 -> 223, stable CBC. No clear e/o infection. Mental status perhaps marginally improved with lactulose administration. Patient denies recent alcohol use. Abd US with patent TIPS, no ascites. Treat for hepatic encephalopathy and see if she clears over next 24-48h.  -Continue lactulose, titrate to 2-3 bowel movements.   -Daily CMP, CBC.   -Treat hyperglycemia; perhaps lability in this has affected mentation as well.   -Monitor for infection, watch culture data.   -Continue diuretics, monitor renal function daily  -Continue high dose thiamine    BG lability  DM II  - Endocrinology following, recommendations as below   - Insulin regimen per endocrinology  - Appreciate care coordination assistance with home nurse visit for insulin teachin    Nausea  Concern for gastroparesis  - PRN antiemetics  - gastric emptying study, referral to be placed for outpatient assessment if unable to be completed inpatient    Chronic  Anemia: Daily CBC.   H/o DVT in arm associated with PICC line: patient not taking apixaban anymore per pharmacy, seems to have completed 3 months treatment earlier this year. No symptoms in arms.   HLD: continue statin.  GERD: continue PPI.  Neuropathy: continue pregabalin.    Hypothyroidism: continue synthroid.      Diet: Snacks/Supplements Adult: Aixa GleeMaster Standard Oral Supplement; With Meals  Combination Diet Six Small Feedings Adult    DVT Prophylaxis: Enoxaparin  Rossi Catheter: Not present  Central Lines: None  Cardiac Monitoring: None  Code Status: Full Code        The patient's  care was discussed with the Bedside Nurse and Patient.    Melissa Parish MD  Hospitalist Service, GOLD TEAM 9  [facility]  Securely message with the Vocera Web Console (learn more here)  Text page via [facility] Paging/Directory   Please see signed in provider for up to date coverage information      Clinically Significant Risk Factors Present on Admission                       ______________________________________________________________________    Interval History   Nursing notes reviewed. No acute events overnight.    Has stool output.   Has abd discomfort that is unchanged.   Still feels tired but less confused.  Nauseated, no vomiting.   4 point ROS negative    Data reviewed today: I reviewed all medications, new labs and imaging results over the last 24 hours. I personally reviewed no images or EKG's today.    Physical Exam   Vital Signs: Temp: 98.6  F (37  C) Temp src: Oral BP: 95/57 Pulse: 67   Resp: 15 SpO2: 100 % O2 Device: None (Room air)    Weight: 161 lbs 6.03 oz  Exam  Gen: awake and alert, appears comfortable, appears stated age  HEENT: NCAT, sclerae anicteric  CV: RRR, S1/S2, extremities warm and well perfused, no lower extremity edema  Pulm: normal work of breathing, lungs clear to auscultation, no crackles or wheezing  GI: Nontender, nondistended  Skin: warm, no jaundice  Neuro: Aox3, speech normal, moves all extremities symmetrically and equally  Psych: somewhat distressed from feeling uncomfortable      Data   Recent Labs   Lab 07/13/22  1809 07/13/22  1327 07/13/22  0824 07/13/22  0548 07/12/22  0738 07/12/22  0607 07/11/22  0757 07/11/22  0558 07/09/22  1827 07/09/22  1543   WBC  --   --   --  4.0  --  4.0  --  3.8*   < > 3.6*   HGB  --   --   --  9.7*  --  9.9*  --  9.2*   < > 10.4*   MCV  --   --   --  103*  --  103*  --  102*   < > 104*   PLT  --   --   --  81*  --  85*  --  85*   < > 99*   INR  --   --   --   --   --   --   --  1.73*   --  1.49*   NA  --   --   --  134*  --  131*  --  135*   < > 132*   POTASSIUM  --   --   --  3.8  --  3.6  --  3.9   < > 3.9   CHLORIDE  --   --   --  106  --  105  --  107   < > 101   CO2  --   --   --  22  --  22  --  22   < > 22   BUN  --   --   --  11.3  --  13.2  --  11.6   < > 12.2   CR  --   --   --  0.77  --  0.84  --  0.99*   < > 0.97*   ANIONGAP  --   --   --  6*  --  4*  --  6*   < > 9   MAURI  --   --   --  7.9*  --  8.0*  --  7.8*   < > 8.7   * 166* 231* 298*   < > 231*   < > 154*   < > 513*   ALBUMIN  --   --   --  2.3*  --  2.5*  --  2.3*   < > 3.0*   PROTTOTAL  --   --   --  4.9*  --  5.3*  --  5.0*   < > 6.4   BILITOTAL  --   --   --  1.5*  --  1.5*  --  1.5*   < > 2.1*   ALKPHOS  --   --   --  111*  --  119*  --  110*   < > 132*   ALT  --   --   --  10  --  9*  --  9*   < > 14   AST  --   --   --  33  --  34  --  34   < > 40*   LIPASE  --   --   --   --   --   --   --   --   --  6*    < > = values in this interval not displayed.

## 2022-07-14 NOTE — PLAN OF CARE
Time 8666-7360    Vitals: VSS on RA  Activity: SBA  Pain: Denies  Neuro: A&OX4    Cardiac:  WNL  Respiratory:  Denies cough/SOB  GI/: Voids spontaneously, LBM 7/13, Denies N/V  Diet: Regular - 6 small feedings, NPO after MN for procedure  Lines: L PIV SL  Skin/Wounds: CDI  Labs/imaging: Reviewed        New changes this shift:  Stable overnight.     Plan: 0800 Nuc Med for gastric emptying study, Continue to manage BG.    Continue to monitor and follow POC     Goal Outcome Evaluation:    Plan of Care Reviewed With: patient     Overall Patient Progress: no change    Outcome Evaluation: NPO after MN for gastric emptying study

## 2022-07-14 NOTE — PROVIDER NOTIFICATION
[facility] called with questions of insulin order , and wants to speak with provider . Paged placed to Melissa Parish MD ( [phone]) and  With [employer]  Phone ( [phone])

## 2022-07-14 NOTE — PROGRESS NOTES
"/63 (BP Location: Right arm)   Pulse 78   Temp 97.4  F (36.3  C) (Oral)   Resp 16   Ht 1.676 m (5' 6\")   Wt 74.7 kg (164 lb 9.6 oz)   SpO2 100%   BMI 26.57 kg/m      Status: admitted on 07/09/22 with hyperglycemia.     VSS, A & O x 4, denies pain, no n/v, up with SBA, voiding not saving, Left PIV SL, NPO at Midnight. Continue to monitor.   "

## 2022-07-15 ENCOUNTER — TELEPHONE (OUTPATIENT)
Dept: INTERNAL MEDICINE | Facility: CLINIC | Age: 52
End: 2022-07-15

## 2022-07-15 LAB — ELASTASE PANC STL-MCNT: 2.7 UG/G

## 2022-07-15 RX ORDER — FUROSEMIDE 40 MG
TABLET ORAL
Qty: 90 TABLET | Refills: 1 | Status: SHIPPED | OUTPATIENT
Start: 2022-07-15 | End: 2022-09-11

## 2022-07-15 NOTE — TELEPHONE ENCOUNTER
Diabetes Education Scheduling Outreach #1:    Call to patient to schedule. Left message with phone number to call to schedule.    Also sent [employer] message for second attempt. Requested patient to call to schedule.    Edwina Gil [facility]  Diabetes and Nutrition Scheduling

## 2022-07-15 NOTE — PLAN OF CARE
Occupational Therapy Discharge Summary    Reason for therapy discharge:    Discharged to home.    Progress towards therapy goal(s). See goals on Care Plan in [facility] electronic health record for goal details.  Goals partially met.  Barriers to achieving goals:   discharge from facility.    Therapy recommendation(s):    Pt is slightly below baseline activity tolerance, general weakness, cognition. Likely to progress to complete I/ADL's safely with family assist at home as needed.

## 2022-07-15 NOTE — PLAN OF CARE
"Blood pressure 102/61, pulse 68, temperature 98.7  F (37.1  C), temperature source Oral, resp. rate 20, height 1.676 m (5' 6\"), weight 75.9 kg (167 lb 4.8 oz), SpO2 96 %, RA.    Patient A & O X 4, VSS no respiratory distress noted , denies pain or nausea . Patient received scheduled medications as ordered. Appetite fair . BS range from 203, 194, 277. Insulin given per order. Stool sample sent. Patient stable and has discharge order to go home. Writer reviewed discharge instructions with patient and instruct patient to take her medications as order and to follow up with primary care provider as scheduled   Patient verbalized understanding .   Home care referral to follow with patient at home. Patient was educated with insulin and patient report she has been taking insulin at home , and her  is helping too.writer asked patient if she has questions or concerns. Patient report back no questions or concerns.  to proved transportation. Patient the unit at ~ 1930, accompanied  by unit NST and patient took all belonging with.           Goal Outcome Evaluation:    Plan of Care Reviewed With: patient    Overall Patient Progress: improving. Patient stale and discharge to Home .            "

## 2022-07-19 ENCOUNTER — TELEPHONE (OUTPATIENT)
Dept: GASTROENTEROLOGY | Facility: CLINIC | Age: 52
End: 2022-07-19

## 2022-07-19 ENCOUNTER — PATIENT OUTREACH (OUTPATIENT)
Dept: GASTROENTEROLOGY | Facility: CLINIC | Age: 52
End: 2022-07-19

## 2022-07-19 NOTE — TELEPHONE ENCOUNTER
[facility] Call Center    Phone Message    May a detailed message be left on voicemail: yes     Reason for Call: Other:     Susan is requesting an earlier follow up appointment time.  She stated that she cannot wait until November for a follow up that was scheduled for 8/8/2022.  She was quite upset. Please call to discuss.    Action Taken: Message routed to:  [facility] hep    Travel Screening: Not Applicable

## 2022-07-19 NOTE — PROGRESS NOTES
Hepatology post hospital discharge RNCC assessment    Post hospital discharge follow up:      1. Admission diagnosis:  Altered mental status    2. Discharge diagnosis:  Altered mental status, hepatic encephalopathy (likely), BG lability, DM II, and nausea  3. Admitted on: 7/9/2022  4. Discharged on:  7/14/2022    Medication Review    1. Medication review completed.    2. Discharge medication changes reviewed.   3. Patient did have new medications prescribed in hospital.  - Lactulose 30 grams (45 ml)- Take 30 grams (45 ml) by mouth 3 times daily AND 20 grams (30 ml) by mouth 2 times daily as needed    Follow Up Post Discharge    1. Reviewed discharge instructions with patient. Follow up appointments reviewed and transportation to appointments confirmed.   2. Patient able to verbalized understanding of discharge instructions including medications and follow up.      3. Care coordinator role and contact information reviewed, and pt encouraged to call with questions or concerns.     Symptom Review    1.  Ascites:  No ascites found on US abdomen complete with TIPS doppler on 7/9.  2.  Edema: Denied lower extremity edema. PTA doses of diuretics continued (furosemide 40 mg daily and spironolactone 100 mg daily)   3.  Encephalopathy: Pt is taking lactulose 45 ml 3 times daily as instructed. Pt is averaging 4-5 semi formed BMs/day. Pt has been compliant with xifaxan. Denied confusion.   4.  Diabetes Mellitus: Pt stated that her blood sugars are still somewhat labile. Pt has appointment with diabetes educator on 8/9/22. Encouraged pt to reach out PCP or endocrinology team if blood sugars do not stabilize.    Collaboration    Above discussed with Dr. Cook.  Orders received.      Plan    1. Home care referral placed by inpatient team and RN coming to see pt today, 7/20.   2. Pt has PCP appointment on 7/28 with Harleen Billy.  3. Pt had hepatology follow up scheduled in August with Dr. Cook but appointment needed to be  rescheduled. Pt is currently scheduled with Dr. Cook on 11/8 and is requesting a sooner appointment. Writer will discuss this with Dr. Cook and follow up with pt.       Patient was given an opportunity to ask questions and have those questions answered to her satisfaction.  Patient verbalized understanding of instructions provided and agreed to plan of care.

## 2022-07-19 NOTE — TELEPHONE ENCOUNTER
Attempted to reach patient to discuss hepatology follow up, no answer, message left requesting call back, number provided.

## 2022-07-30 ENCOUNTER — HEALTH MAINTENANCE LETTER (OUTPATIENT)
Age: 52
End: 2022-07-30

## 2022-07-30 ENCOUNTER — TELEPHONE (OUTPATIENT)
Dept: TRANSPLANT | Facility: CLINIC | Age: 52
End: 2022-07-30

## 2022-07-30 NOTE — TELEPHONE ENCOUNTER
Susan, paged back, returned call twice  and no answer. Left message for Susan. Encouraged to request covid 19 treatment virtual appt for treatment.

## 2022-07-30 NOTE — TELEPHONE ENCOUNTER
Patient paged that she is covid positive. Attempted to reach patient 3 times and no answer. Left message for patient to make covid 19 treatment appt via my chart and to return call to discuss.

## 2022-08-01 ENCOUNTER — DOCUMENTATION ONLY (OUTPATIENT)
Dept: TRANSPLANT | Facility: CLINIC | Age: 52
End: 2022-08-01

## 2022-08-01 ENCOUNTER — PATIENT OUTREACH (OUTPATIENT)
Dept: GASTROENTEROLOGY | Facility: CLINIC | Age: 52
End: 2022-08-01

## 2022-08-01 ENCOUNTER — MYC MEDICAL ADVICE (OUTPATIENT)
Dept: TRANSPLANT | Facility: CLINIC | Age: 52
End: 2022-08-01

## 2022-08-01 NOTE — PROGRESS NOTES
Pt tested positive for COVID on 7/30 and was prescribed nirmatrelvir & ritonavir (paxlovid) 150 mg & 100 mg tablet combo pack, Take 2 nirmatrelvir tablets (300 mg) and 1 ritonavir tablet (100 mg) by mouth twice daily for 5 days.    Pt reported intermittent coughing, nasal congestion, and increased fatigue. Denied shortness of breath and chest pain. Pt is taking antiviral as prescribed. Reiterated the importance of pt staying on track with xifaxan and lactulose to prevent hepatic encephalopathy.     Pt is currently inactive on the transplant list with COVID positive diagnosis. Reviewed that pt will need to retest once fully recovered and at least after 10-14 days of having COVID to determine whether pt can be reactivated on the transplant list. Instructed pt to reach out to transplant team with any further questions regarding this.     Will check in with pt in 1 week. Pt verbalized understanding and is agreeable to plan of care.

## 2022-08-01 NOTE — PROGRESS NOTES
COVID + test on 7/30/22    Placed as temp inactive on liver transplant wait list, patient notified via [facility].

## 2022-08-02 ENCOUNTER — COMMITTEE REVIEW (OUTPATIENT)
Dept: TRANSPLANT | Facility: CLINIC | Age: 52
End: 2022-08-02

## 2022-08-02 NOTE — COMMITTEE REVIEW
Abdominal Committee Review Note     Evaluation Date: 10/18/2020  Committee Review Date: 8/2/2022    Organ being evaluated for: Liver    Transplant Phase: Waitlist  Transplant Status: Inactive    Transplant Coordinator: Julia Munoz  Transplant Surgeon:   Mil Tejada    Referring Physician: Rivera Dowling    Primary Diagnosis: Cirrhosis: Fatty Liver (CUETO)  Secondary Diagnosis:     Committee Review Members:  Anesthesiology Wilmer Jacobo,    Dietitian, Registered Kelin Mcginnis, TRE   Gastroenterology Dayton Sellers MD   [facility] Gilbert Agosto, [facility]   Pharmacist Zach Conde, [facility]    - Clinical Dat Iglesias, MSW, Juanita Bhagat, [facility]   Transplant Hazel Jean, RN, Ariana Liao LPN, Julia Munoz, RN, Yenni Cristina, AJAY, Brandi Nichols, RN, Jr Yves Briseno, RN, Che Burgess MD, Shelby Vicente, APRN CNP, Yoandy Sadler MD, [facility]   Transplant Hepatology  Onelia Ovi Land MD, Rahpael Cook MD, Che Burgess MD, Raymundo Gutierrez MD, Thomas M. Leventhal, MD   Transplant Surgery Delbert Morgan MD, JUSTYNA Mars-C, Edwina Logan, JORGE, [facility] Yoon Jacobo MD, Alexei Alcaraz MD, Mil Tejada MD       Transplant Eligibility: Cirrhosis with MELD, CUETO    Committee Review Decision: Remain Inactive    Relative Contraindications: Other    Absolute Contraindications: None    Committee Chair Che Burgess MD verbally attested to the committee's decision.    Committee Discussion Details:      Made temporarily inactive due to COVID + status until 2 negative tests by PCR and fully recovered.

## 2022-08-06 ENCOUNTER — TELEPHONE (OUTPATIENT)
Dept: TRANSPLANT | Facility: CLINIC | Age: 52
End: 2022-08-06

## 2022-08-06 NOTE — TELEPHONE ENCOUNTER
Susan called to report she was COVID + late july, still showing symptoms.  Thursday was her last day of paxvolid and today she started to have diarrhea. She states she otherwise feels fine and has had three episodes of diarrhea today. She is concerned regarding diarrhea and continuing lactulose.  She was advised to increase her fluid intake, and if the diarrhea continues there would be concern for dehydration and she should be evaluated.  She states understanding and will call with further questions or concerns.

## 2022-08-11 NOTE — PROGRESS NOTES
Pt still tested positive for COVID on 8/9 and remains inactive on the transplant list. Per Kathy Munoz, pt should retest in 1-2 weeks.     Pt reported feeling back at baseline s/p COVID. Denied cough, shortness of breath, fevers, and chills. Pt is taking xifaxan and lactulose and denied experiencing confusion. Pt reported compliance with all medications.     Will check in with pt in 2-3 weeks. Encouraged pt to call with any questions or concerns. Pt verbalized understanding and is agreeable to plan of care.

## 2022-08-18 ENCOUNTER — TELEPHONE (OUTPATIENT)
Dept: TRANSPLANT | Facility: CLINIC | Age: 52
End: 2022-08-18

## 2022-08-18 NOTE — TELEPHONE ENCOUNTER
Susan called, experiencing itching, worse at night    Previously prescribed Hydroxyzine for itching and anxiety, but hasn't taken in quite some time.  She will try this or benadryl as first line of treatment.  Advised to only take 25 mg benadryl if taking OTC instead of 25-50mg listed on bottle label, she is sensitive to sedating medications.  Discussed spacing from other potentially sedating medications, see how she responds, avoid driving or other activities that could be dangerous if sedating and to make sure taking prescribed lactulose for 2-4 BMs a day at least.

## 2022-08-24 ENCOUNTER — PATIENT OUTREACH (OUTPATIENT)
Dept: GASTROENTEROLOGY | Facility: CLINIC | Age: 52
End: 2022-08-24

## 2022-08-24 DIAGNOSIS — K74.60 LIVER CIRRHOSIS SECONDARY TO NASH (H): Primary | ICD-10-CM

## 2022-08-24 DIAGNOSIS — K75.81 LIVER CIRRHOSIS SECONDARY TO NASH (H): Primary | ICD-10-CM

## 2022-08-24 DIAGNOSIS — L29.9 ITCHING: ICD-10-CM

## 2022-08-24 RX ORDER — RIFAMPIN 300 MG/1
300 CAPSULE ORAL DAILY
Qty: 90 CAPSULE | Refills: 3 | Status: SHIPPED | OUTPATIENT
Start: 2022-08-24 | End: 2022-10-06

## 2022-08-24 NOTE — PROGRESS NOTES
Pt reported uncontrolled generalized itching, mainly at night. Pt stated that she has tried benadryl and hydroxyzine with no relief. Pt had MELD labs and COVID lab drawn on 8/23. Will ensure that transplant team is aware of results.     Reviewed pt's symptoms with Dr. Cook and rifampin 300 mg by mouth daily ordered. Will check in with pt in 1-2 weeks. Pt verbalized understanding and is agreeable to plan of care.

## 2022-08-29 NOTE — DISCHARGE SUMMARY
"[facility]  Hospitalist Discharge Summary      Date of Admission:  7/9/2022  Date of Discharge:  7/14/2022  7:30 PM  Discharging Provider: Melissa Parish MD  Discharge Service: Hospitalist Service, GOLD TEAM 9    Discharge Diagnoses   Altered mental status, improving  Hepatic encephalopathy  BG lability  DM II  Nausea  Concern for gastroparesis      Follow-ups Needed After Discharge   Follow-up Appointments     Adult [facility] Follow-up and recommended labs and tests      Follow up with primary care provider, Harleen Billy, within 7 days for   hospital follow- up and to follow up on results.  The following labs/tests   are recommended: BMP. Fecal elastase. Vitamin A, D, E in 3 months.      Appointments on [place] and/or [facility] (with [facility] or [facility]   provider or service). Call [phone] if you haven't heard regarding   these appointments within 7 days of discharge.           Unresulted Labs Ordered in the Past 30 Days of this Admission     No orders found from 6/9/2022 to 7/10/2022.        Discharge Disposition   Discharged to home  Condition at discharge: Stable    Hospital Course     Altered mental status, improving  Hepatic encephalopathy, likely  Presents with one week of confusion after missing some doses of lactulose. Workup on admission notable for CT head that is unremarkable, mild hyponatremia at 132, ammonia 56, glucose 513 -> 223, stable CBC. No e/o infection throughout stay. Mental status perhaps marginally improved with lactulose administration. Patient denies recent alcohol use. Abd US with patent TIPS, no ascites. Treated for hepatic encephalopathy with improvement to near baseline. Possible that labile blood sugars also contributed. Treated as below.  -Continue lactulose, titrate to 2-3 bowel movements.   -Continue high dose thiamine     BG lability  DM II  - Endocrinology consulted with discharge plan per endo  \"-  Gentle " "increase to Lantus 7 unit(s) every 24 hours at 0800 --> re-timed to 1300 since NPO this AM - needs to wait to eat following the emptying study    -  Change to ECHO PEN for Novolog meal coverage 0.5 unit(s) per 14g cho AC breakfast/lunch and 0.5 unit(s) per 20 g cho for dinner and from  and no meal insulin after 2000 hrs.   Snack coverage:   0800 - 1700 - 0.5 unit(s) per 14 g cho and 1701 - 2000 0.5 unit(s) per 20 g cho       -  Novolog Custom 1/75 TID AC/HS AND 0200    -  BG monitoring TID AC, HS and 0200    -  Hypoglycemia protocol    -  Recommend carb counting protocol    -  Education :  TBD     -  Outpatient follow up:  recommend [facility] Endocrinology (has hx followed with Dr. Stovall - switching care over to  [facility])\"  - Appreciate care coordinator assistance with home insulin teaching     Nausea  Concern for gastroparesis  - PRN antiemetics  - gastric emptying study completed prior to discharge with results pending. Follow up with GI.     Chronic  Anemia: Daily CBC.   H/o DVT in arm associated with PICC line: patient not taking apixaban anymore per pharmacy, seems to have completed 3 months treatment earlier this year. No symptoms in arms.   HLD: continue statin.  GERD: continue PPI.  Neuropathy: continue pregabalin.    Hypothyroidism: continue synthroid.    Consultations This Hospital Stay   NURSING TO CONSULT FOR VASCULAR ACCESS CARE IP CONSULT  ENDOCRINE DIABETES ADULT IP CONSULT  OCCUPATIONAL THERAPY ADULT IP CONSULT  PHYSICAL THERAPY ADULT IP CONSULT  NUTRITION SERVICES ADULT IP CONSULT  CARE MANAGEMENT / SOCIAL WORK IP CONSULT  NUTRITION SERVICES ADULT IP CONSULT  PHARMACY IP CONSULT    Code Status   Prior    Time Spent on this Encounter   I, Melissa Parish MD, personally saw the patient today and spent greater than 30 minutes discharging this patient.       Melissa Parish MD  [facility] [address] [address]  Phone: " [phone]  ______________________________________________________________________    Physical Exam   Vital Signs:                    Weight: 167 lbs 4.8 oz  Gen: awake and alert, appears comfortable, appears stated age  HEENT: NCAT, sclerae anicteric  CV: RRR, S1/S2, extremities warm and well perfused, no lower extremity edema  Pulm: normal work of breathing, lungs clear to auscultation, no crackles or wheezing  GI: Nontender, nondistended  Skin: warm, no jaundice  Neuro: Aox3, speech normal, moves all extremities symmetrically and equally       Primary Care Physician   Harleen Billy    Discharge Orders      Vitamin A     Vitamin D Deficiency     Vitamin E     Basic metabolic panel     Elastase Fecal     Home Care Referral      AMB Adult Diabetes Educator Referral      Reason for your hospital stay    Dear Susan Puckett    You were hospitalized at [facility] with confusion related to liver fog (hepatic encephalopathy) with possible contribution of low/high blood sugars. Your lactulose dose was increased. You can take additional lactulose as needed to have 3 stools per day.  Over your hospitalization your confusion improved and today you are ready to be discharged.      We are suggesting the following medication changes:  Increase lactulose to 45 mL three times daily with two additional doses of 30 mL of lactulose if needed to meet goal stool output  Stop Januvia  Start carb correction as described by endocrinology  Start oral thiamine  Hold off on potassium for now as potassium levels have been normal    Please get the following tests done:  BMP in one week prior to PCP follow-up  Vitamin D, A, E levels in three months  Fecal elastase to determine if creon is still needed    Follow up with GI as scheduled  Follow up with PCP within one week for follow up of blood sugar, mental status/confusion, fatigue  Follow up with endocrinology as scheduled    Thank you for allowing me and my  team the privilege of caring for you today. Please let us know if there is anything else we can do for you so that we can be sure you are leaving completely satisfied with your care experience.    Your hospital unit at the time of discharge is 5B so if you have any questions please call the hospital at [phone] and ask to talk to a nurse on 5B.    Take care!    Melissa Parish MD  Hospitalist     Activity    Your activity upon discharge: activity as tolerated     Adult [facility] Follow-up and recommended labs and tests    Follow up with primary care provider, Harleen Billy, within 7 days for hospital follow- up and to follow up on results.  The following labs/tests are recommended: BMP. Fecal elastase. Vitamin A, D, E in 3 months.      Appointments on [place] and/or [facility] (with [facility] or [facility] provider or service). Call [phone] if you haven't heard regarding these appointments within 7 days of discharge.     Diet    Follow this diet upon discharge: Orders Placed This Encounter      Snacks/Supplements Adult: Aixa Jean Standard Oral Supplement; With Meals      Combination Diet Six Small Feedings Adult       Significant Results and Procedures   Results for orders placed or performed during the hospital encounter of 07/09/22   US Abdomen Complete with TIPSS Doppler    Narrative    EXAMINATION: US ABDOMEN COMPLETE WITH TIPSS DOPPLER, 7/9/2022 6:29 PM     COMPARISON: 2/16/2022, 12/27/2021    HISTORY: Worsening confusion, RUQ pain s/p TIPS procedure.    TECHNIQUE:  Grey-scale, color Doppler and spectral flow analysis.    Findings:     Liver: Liver demonstrates increased echogenicity and coarse  echotexture. The left lobe of the liver was not well-visualized. The  main portal vein is patent with antegrade flow.  Gallbladder: The gallbladder is well distended and of normal  morphology. There is no wall thickening, pericholecystic fluid,  sonographic Castillo's sign nor evidence for  cholelithiasis.    Bile Ducts: Both the intrahepatic biliary system is of normal caliber.  Common bile duct is not well-visualized on this exam.    Pancreas: The pancreas is not well-visualized.    Kidneys:  Normal appearance of the right kidney. No hydronephrosis.  Right kidney measures 9.8 cm. The left kidney was not visualized.  Spleen: The spleen is enlarged and measures 12.3 cm in sagittal  dimension.    Aorta and IVC: The visualized IVC is within normal limits. The aorta  was not visualized.    Fluid: No free fluid.    DOPPLER:     There is flow antegrade the liver in the main portal vein:  27 cm/sec in the main portal vein  Retrograde at 24 cm/sec in the right portal vein  Left portal vein was not visualized.    There is flow antegrade the liver in the hepatic artery:  88 cm/sec peak systolic flow  32 cm/sec minimum diastolic flow   0.64 resistive index     The stent velocities are  50 cm/sec at the portal vein  141 cm/sec in mid stent  119 cm/sec in the hepatic  vein distal shunt end.     The splenic vein is patent and flow is towards the liver.     The middle hepatic vein is patent with flow towards the IVC.       Impression    Impression:   1. Patent TIPS. Stable velocities through the TIPS.  2. Cirrhotic morphology of the liver.  3. No free fluid in the right lower quadrant or left lower quadrant.      I have personally reviewed the examination and initial interpretation  and I agree with the findings.    DANNY TRIPP MD         SYSTEM ID:  [id number]   Chest XR,  PA & LAT    Narrative    EXAM: XR CHEST 2 VW 7/9/2022 5:01 PM    HISTORY: cough.    COMPARISON: Radiograph of the chest dated 12/26/2021.    TECHNIQUE: Upright frontal and lateral views of the chest.    FINDINGS: Trachea is midline. Cardiac and mediastinal silhouette is  within normal limits. No focal pulmonary opacities. No pleural  effusions. No pneumothoraces. Osteopenic appearance of the bones. No  acute osseous abnormalities. Partially  visualized air distended colon.      Impression    IMPRESSION: No focal pulmonary opacities    I have personally reviewed the examination and initial interpretation  and I agree with the findings.    DANNY TRIPP MD         SYSTEM ID:  [id number]   Head CT w/o contrast    Narrative    CT HEAD W/O CONTRAST 7/9/2022 8:03 PM    History: AMS   ICD-10:    Comparison: CT of the head dated 2/15/2022    Technique: Using multidetector thin collimation helical acquisition  technique, axial, coronal and sagittal CT images from the skull base  to the vertex were obtained without intravenous contrast.   (topogram) image(s) also obtained and reviewed.    Findings: There is no intracranial hemorrhage, mass effect, or midline  shift. Gray/white matter differentiation in both cerebral hemispheres  is preserved. Ventricles are proportionate to the cerebral sulci. The  basal cisterns are clear.    The bony calvaria and the bones of the skull base are normal. The  visualized portions of the paranasal sinuses and mastoid air cells are  clear.      Impression    Impression:  No acute intracranial pathology.     I have personally reviewed the examination and initial interpretation  and I agree with the findings.    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 assess for delayed gastric emptying    TECHNIQUE: The patient ingested 2 mCi of Tc-99m sulfur colloid in 2  eggs along with 2 pieces of toast with jelly and water. Static images  were acquired at approximately 1 hour intervals out through 4 hours  using a dual head gamma camera in an anterior and posterior position.  The calculation of emptying was based on dual head imaging with  geometric mean calculations.  A Linear square fit was calculated to  the emptying curve to determine the amount of residual activity  at  each time point.    FINDINGS:   Percent retention at 60 min = 79%.  Percent retention at 120 min = 50%.  Percent retention at 180 min = 26%.  Percent retention at 240 min = 4%.    T 1/2 emptying time =  117 mins.      Impression    IMPRESSION: Normal  gastric emptying.  =====================  Reference Range:  Gastric emptying  - 30 minutes: <70% of retention (> 30% emptying) suggests abnormally     fast emptying.  - 60 minutes: <90% retention (>10% emptying) is normal; less than 30%     retention (>70% emptying) suggests abnormally rapid emptying.  - 90 minutes: <65% retention (> 35% emptying) is normal.  - 120 minutes: <60% retention (> 40% emptying) is normal.  - 180 minutes: <30% retention (> 70% emptying) is normal.    Gastric emptying T-1/2:  Solid: The normal range is  minutes  Liquid only: Normal range is 10-45 minutes.  Liquid only-children: At 60 minutes, normal range is 44-58 % .  Liquid only-infants: At 60 minutes, normal range is 32-64 %.      I have personally reviewed the examination and initial interpretation  and I agree with the findings.    JORGE LOU MD         SYSTEM ID:  [id number]       Discharge Medications   Discharge Medication List as of 7/14/2022  7:06 PM      START taking these medications    Details   Injection Device for insulin (NOVOPEN ECHO) LUNA 1 each once for 1 dose, Disp-1 each, R-0, E-Prescribe      thiamine (B-1) 100 MG tablet Take 1 tablet (100 mg) by mouth daily for 30 days, Disp-30 tablet, R-0, E-Prescribe         CONTINUE these medications which have CHANGED    Details   acetaminophen (TYLENOL) 325 MG tablet Take 1 tablet (325 mg) by mouth every 4 hours as needed for mild pain, Historical      !! amylase-lipase-protease (CREON 24) 30435-32225 units CPEP per EC capsule Take 1 capsule by mouth Take with snacks or supplements (with snacks), Disp-90 capsule, R-0, E-Prescribe      !! amylase-lipase-protease (CREON 24) 53806-33144 units CPEP per EC capsule Take  2 capsules by mouth 3 times daily (with meals). May also take 1 capsule Take with snacks or supplements (for meals/snacks)., Disp-180 capsule, R-0, E-Prescribe      ferrous gluconate (FERGON) 324 (38 Fe) MG tablet Take 1 tablet (324 mg) by mouth daily for 30 days Take with Vitamin C supplement., Disp-30 tablet, R-0, E-Prescribe      insulin aspart (NOVOPEN ECHO) 100 UNIT/ML cartridge 0.5 unit(s) per 14g cho AC breakfast/lunch AND 0.5 unit(s) per 20 g cho for dinner AND NO meal insulin after 2000 hrs.  Snack coverage:   0800 - 1700 - 0.5 unit(s) per 14 g cho and 1701 - 2000 0.5 unit(s) per 20 g cho, Disp-6 mL, R-3, E-Prescribe      insulin glargine (LANTUS PEN) 100 UNIT/ML pen Inject 7 Units Subcutaneous every 24 hours, Disp-15 mL, HistoricalIf Lantus is not covered by insurance, may substitute Basaglar or Semglee or other insulin glargine product per insurance preference at same dose and frequency.        !! lactulose (CHRONULAC) 10 GM/15ML solution Take 30 mLs (20 g) by mouth 2 times daily as needed (for increased confusion or as needed to target 3 bowel movements per day in addition to three times daily dose), Disp-1800 mL, R-0, E-Prescribe      !! lactulose (CHRONULAC) 10 GM/15ML solution Take 45 mLs (30 g) by mouth 3 times daily, Historical       !! - Potential duplicate medications found. Please discuss with provider.      CONTINUE these medications which have NOT CHANGED    Details   atorvastatin (LIPITOR) 20 MG tablet Take 20 mg by mouth every evening , Historical      calcium carbonate-vitamin D (OSCAL W/D) 500-200 MG-UNIT tablet Take 1 tablet by mouth 2 times daily (with meals) Take one tab once in AM and once in PM with meals, Disp-180 tablet, R-3, E-Prescribe      CALCIUM CITRATE PO Take 600 mg by mouth 2 times daily, Historical      cyanocobalamin (VITAMIN B-12) 1000 MCG tablet Take 1,000 mcg by mouth every 7 days Take 1 tablet by mouth every 7 days on Wednesdays, Historical      escitalopram (LEXAPRO)  10 MG tablet Take 1 tablet (10 mg) by mouth daily, Disp-60 tablet, R-0, E-Prescribe      famotidine (PEPCID) 20 MG tablet Take 20 mg by mouth every morning (before breakfast), Historical      folic acid (FOLVITE) 1 MG tablet Take 1 mg by mouth every evening , Historical      levothyroxine (SYNTHROID/LEVOTHROID) 200 MCG tablet Take 1 tablet by mouth daily, Historical      melatonin 5 MG tablet Take 5 mg by mouth At Bedtime, Historical      multivitamin CF FORMULA (DEKAS PLUS) capsule Take 1 capsule by mouth daily, Disp-90 capsule, R-3, E-Prescribe      ondansetron (ZOFRAN ODT) 8 MG ODT tab Take 1 tablet by mouth every 8 hours as needed for nausea, Historical      pregabalin (LYRICA) 25 MG capsule Take 1 capsule (25 mg) by mouth daily, Disp-30 capsule, R-0, Local Print      prochlorperazine (COMPAZINE) 10 MG tablet Take 10 mg by mouth every 6 hours as needed for nausea, vomiting or nausea, Historical      rizatriptan (MAXALT) 10 MG tablet Take 10 mg by mouth at onset of headache for migraine, Historical      spironolactone (ALDACTONE) 50 MG tablet Take 2 tablets (100 mg) by mouth daily, Disp-30 tablet, R-3, No Print Out      SUMAtriptan (IMITREX) 100 MG tablet Take 0.5-1 tablets by mouth as needed for headaches, Historical      topiramate (TOPAMAX) 50 MG tablet Take 50 mg by mouth daily , Historical      traZODone (DESYREL) 100 MG tablet Take 0.5 tablets (50 mg) by mouth At Bedtime, Historical      valACYclovir (VALTREX) 1000 mg tablet Take 1,000 mg by mouth daily as needed (at onset of cold sore), Historical      vitamin A 3 MG (99451 UNITS) capsule Take 10,000 Units by mouth daily , Historical      vitamin C (ASCORBIC ACID) 1000 MG TABS Take 1,000 mg by mouth daily , Historical      vitamin D3 (CHOLECALCIFEROL) 50 mcg (2000 units) tablet Take 1 tablet (50 mcg) by mouth daily Take one tablet daily, Disp-90 tablet, R-3, E-Prescribe      vitamin E (TOCOPHEROL) 400 units (180 mg) capsule Take 400 Units by mouth daily ,  Historical      XIFAXAN 550 MG TABS tablet TAKE 1 TABLET (550 MG) BY MOUTH 2 TIMES DAILY, Disp-180 tablet, R-3, E-Prescribe      furosemide (LASIX) 40 MG tablet Take 1 tablet (40 mg) by mouth daily, Disp-30 tablet, R-3, E-Prescribe      Continuous Blood Gluc  (DEXCOM G6 ) LUNA Use as directed for continuous glucose monitoring., Historical      Continuous Blood Gluc Sensor (DEXCOM G6 SENSOR) MISC Use as directed for continuous glucose monitoring.  Change sensor every 10 days., Historical      Continuous Blood Gluc Transmit (DEXCOM G6 TRANSMITTER) MISC Use as directed for continuous glucose monitoring.  Change every 3 months., Historical      insulin pen needle (BD GRISEL U/F) 32G X 4 MM miscellaneous Inject 1 each SubcutaneousHistorical         STOP taking these medications       diazepam (VALIUM) 2 MG tablet Comments:   Reason for Stopping:         hydrOXYzine (ATARAX) 25 MG tablet Comments:   Reason for Stopping:         insulin aspart (NOVOLOG FLEXPEN) 100 UNIT/ML pen Comments:   Reason for Stopping:         potassium chloride ER (K-TAB) 20 MEQ CR tablet Comments:   Reason for Stopping:         sitagliptin (JANUVIA) 100 MG tablet Comments:   Reason for Stopping:             Allergies   Allergies   Allergen Reactions     Methylprednisolone Hives     Droperidol Anxiety     Nsaids Other (See Comments)     GI bleed.     Prednisone Anxiety, Hives and Rash

## 2022-08-30 ENCOUNTER — COMMITTEE REVIEW (OUTPATIENT)
Dept: TRANSPLANT | Facility: CLINIC | Age: 52
End: 2022-08-30

## 2022-08-30 DIAGNOSIS — K74.60 LIVER CIRRHOSIS SECONDARY TO NASH (H): ICD-10-CM

## 2022-08-30 DIAGNOSIS — K75.81 LIVER CIRRHOSIS SECONDARY TO NASH (H): ICD-10-CM

## 2022-08-30 RX ORDER — SPIRONOLACTONE 50 MG/1
100 TABLET, FILM COATED ORAL DAILY
Qty: 180 TABLET | Refills: 3 | Status: SHIPPED | OUTPATIENT
Start: 2022-08-30 | End: 2022-09-11

## 2022-08-30 NOTE — COMMITTEE REVIEW
Abdominal Committee Review Note     Evaluation Date: 10/18/2020  Committee Review Date: 8/30/2022    Organ being evaluated for: Liver    Transplant Phase: Waitlist  Transplant Status: Inactive    Transplant Coordinator: Julia Munoz  Transplant Surgeon:   Mil Tejada    Referring Physician: Rivera Dowling    Primary Diagnosis: Cirrhosis: Fatty Liver (CUETO)  Secondary Diagnosis:     Committee Review Members:  Gastroenterology Dayton Sellers MD   Nutrition Kelin Mcginnis, TRE   Pharmacist Zach Conde, [facility]    - Clinical Dat Iglesias, MSDEVIN, Juanita Bhagat, [facility]   Transplant Hazel Jean, RN, Ariana Liao LPN, Julia Munoz, RN, Yenni Cristina, RN, Brandi Nichols, AJAY, Jr Yves Briseno, AJAY, Che Burgess MD, Edwina Fallon RN, Shelby Vicente, APRN CNP, Yoandy Sadler MD, Oliver Tran, RN   Transplant Hepatology  Onelia Land MD, Raphael Cook MD, Lacy Burnette MD, Raymundo Gutierrez MD, Yoandy Sadler MD, Thomas M. Leventhal, MD   Transplant Surgery Delbert Morgan MD, Sunday Ricci MD, Foreign Benitez MD, Alexei Alcaraz MD       Transplant Eligibility: Cirrhosis with MELD, CUETO    Committee Review Decision: Make Active    Relative Contraindications: None    Absolute Contraindications: None    Committee Chair Leventhal, Thomas Michael, MD verbally attested to the committee's decision.    Committee Discussion Details:           - approved to be reactivated, COVID test negative and fully recovered.

## 2022-09-01 ENCOUNTER — PATIENT OUTREACH (OUTPATIENT)
Dept: GASTROENTEROLOGY | Facility: CLINIC | Age: 52
End: 2022-09-01

## 2022-09-01 DIAGNOSIS — K75.81 LIVER CIRRHOSIS SECONDARY TO NASH (H): Primary | ICD-10-CM

## 2022-09-01 DIAGNOSIS — K74.60 LIVER CIRRHOSIS SECONDARY TO NASH (H): Primary | ICD-10-CM

## 2022-09-02 DIAGNOSIS — K74.60 LIVER CIRRHOSIS SECONDARY TO NASH (H): Primary | ICD-10-CM

## 2022-09-02 DIAGNOSIS — K75.81 LIVER CIRRHOSIS SECONDARY TO NASH (H): Primary | ICD-10-CM

## 2022-09-11 ENCOUNTER — TELEPHONE (OUTPATIENT)
Dept: TRANSPLANT | Facility: CLINIC | Age: 52
End: 2022-09-11

## 2022-09-11 DIAGNOSIS — K74.60 LIVER CIRRHOSIS SECONDARY TO NASH (H): ICD-10-CM

## 2022-09-11 DIAGNOSIS — K75.81 LIVER CIRRHOSIS SECONDARY TO NASH (H): ICD-10-CM

## 2022-09-11 RX ORDER — SPIRONOLACTONE 50 MG/1
150 TABLET, FILM COATED ORAL DAILY
Qty: 270 TABLET | Refills: 3 | Status: ON HOLD | OUTPATIENT
Start: 2022-09-11 | End: 2022-11-10

## 2022-09-11 RX ORDER — FUROSEMIDE 40 MG
60 TABLET ORAL DAILY
Qty: 135 TABLET | Refills: 1 | Status: ON HOLD | OUTPATIENT
Start: 2022-09-11 | End: 2022-11-10

## 2022-09-11 NOTE — TELEPHONE ENCOUNTER
Susan paged that she is having edema in abdomen and ankles.attempted to reach, but did not answer. Left message for patient to return call and to ensure that she is taking lasix, sprinolactone and watching her salt intake.

## 2022-09-11 NOTE — TELEPHONE ENCOUNTER
Rivera and Susan paged back. Rivera did answer the phone. Pt has swelling in legs feet to stomach. Pt's weight is up. 167 on Thursday and 181 today.   Pt watching salt intake. Pt has nausea and has attempted zofran. Pt has headaches. Pt is drinking fluids. Pt has had 36 ounces of water. Susan reports that she is taking 40 mg lasix daily and 100 mg spirolactone daily.     Per Dr Sadler patient to increase spirolactone 150 mg daily and lasix 60 mg and continue with plan of visit with dr goetz. Message to [facility] for follow up

## 2022-09-11 NOTE — TELEPHONE ENCOUNTER
shannan paged that she has swelling in legs. Attempted to reach again, but no answer. Left message for patient to return call.

## 2022-09-12 ENCOUNTER — MYC MEDICAL ADVICE (OUTPATIENT)
Dept: TRANSPLANT | Facility: CLINIC | Age: 52
End: 2022-09-12

## 2022-09-15 ENCOUNTER — OFFICE VISIT (OUTPATIENT)
Dept: GASTROENTEROLOGY | Facility: CLINIC | Age: 52
End: 2022-09-15
Attending: INTERNAL MEDICINE
Payer: COMMERCIAL

## 2022-09-15 ENCOUNTER — LAB (OUTPATIENT)
Dept: LAB | Facility: CLINIC | Age: 52
End: 2022-09-15
Payer: COMMERCIAL

## 2022-09-15 ENCOUNTER — TELEPHONE (OUTPATIENT)
Dept: TRANSPLANT | Facility: CLINIC | Age: 52
End: 2022-09-15

## 2022-09-15 VITALS
BODY MASS INDEX: 27.84 KG/M2 | HEART RATE: 68 BPM | OXYGEN SATURATION: 100 % | SYSTOLIC BLOOD PRESSURE: 115 MMHG | WEIGHT: 172.5 LBS | DIASTOLIC BLOOD PRESSURE: 71 MMHG | TEMPERATURE: 98.1 F

## 2022-09-15 DIAGNOSIS — D50.8 OTHER IRON DEFICIENCY ANEMIA: ICD-10-CM

## 2022-09-15 DIAGNOSIS — E55.9 VITAMIN D DEFICIENCY: ICD-10-CM

## 2022-09-15 DIAGNOSIS — K74.60 CIRRHOSIS OF LIVER WITHOUT ASCITES, UNSPECIFIED HEPATIC CIRRHOSIS TYPE (H): Primary | ICD-10-CM

## 2022-09-15 DIAGNOSIS — K74.60 LIVER CIRRHOSIS SECONDARY TO NASH (H): ICD-10-CM

## 2022-09-15 DIAGNOSIS — K75.81 LIVER CIRRHOSIS SECONDARY TO NASH (H): ICD-10-CM

## 2022-09-15 LAB
ALBUMIN SERPL BCG-MCNC: 2.6 G/DL (ref 3.5–5.2)
ALP SERPL-CCNC: 91 U/L (ref 35–104)
ALT SERPL W P-5'-P-CCNC: <5 U/L (ref 10–35)
ANION GAP SERPL CALCULATED.3IONS-SCNC: 10 MMOL/L (ref 7–15)
AST SERPL W P-5'-P-CCNC: 35 U/L (ref 10–35)
BILIRUB DIRECT SERPL-MCNC: 0.65 MG/DL (ref 0–0.3)
BILIRUB SERPL-MCNC: 1.4 MG/DL
BUN SERPL-MCNC: 13.3 MG/DL (ref 6–20)
CALCIUM SERPL-MCNC: 8.7 MG/DL (ref 8.6–10)
CHLORIDE SERPL-SCNC: 107 MMOL/L (ref 98–107)
CREAT SERPL-MCNC: 1.02 MG/DL (ref 0.51–0.95)
DEPRECATED HCO3 PLAS-SCNC: 23 MMOL/L (ref 22–29)
ERYTHROCYTE [DISTWIDTH] IN BLOOD BY AUTOMATED COUNT: 16 % (ref 10–15)
GFR SERPL CREATININE-BSD FRML MDRD: 67 ML/MIN/1.73M2
GLUCOSE SERPL-MCNC: 331 MG/DL (ref 70–99)
HCT VFR BLD AUTO: 28.5 % (ref 35–47)
HGB BLD-MCNC: 9.2 G/DL (ref 11.7–15.7)
IRON BINDING CAPACITY (ROCHE): 191 UG/DL (ref 240–430)
IRON SATN MFR SERPL: 30 % (ref 15–46)
IRON SERPL-MCNC: 58 UG/DL (ref 37–145)
MCH RBC QN AUTO: 33.9 PG (ref 26.5–33)
MCHC RBC AUTO-ENTMCNC: 32.3 G/DL (ref 31.5–36.5)
MCV RBC AUTO: 105 FL (ref 78–100)
PLATELET # BLD AUTO: 101 10E3/UL (ref 150–450)
POTASSIUM SERPL-SCNC: 4.2 MMOL/L (ref 3.4–5.3)
PROT SERPL-MCNC: 5.7 G/DL (ref 6.4–8.3)
RBC # BLD AUTO: 2.71 10E6/UL (ref 3.8–5.2)
RETICS # AUTO: 0.09 10E6/UL (ref 0.03–0.1)
RETICS/RBC NFR AUTO: 3.3 % (ref 0.5–2)
SODIUM SERPL-SCNC: 140 MMOL/L (ref 136–145)
VIT B12 SERPL-MCNC: 786 PG/ML (ref 232–1245)
WBC # BLD AUTO: 3.1 10E3/UL (ref 4–11)

## 2022-09-15 PROCEDURE — 82607 VITAMIN B-12: CPT | Performed by: INTERNAL MEDICINE

## 2022-09-15 PROCEDURE — 36415 COLL VENOUS BLD VENIPUNCTURE: CPT | Performed by: PATHOLOGY

## 2022-09-15 PROCEDURE — 99214 OFFICE O/P EST MOD 30 MIN: CPT | Performed by: INTERNAL MEDICINE

## 2022-09-15 PROCEDURE — 80053 COMPREHEN METABOLIC PANEL: CPT | Performed by: PATHOLOGY

## 2022-09-15 PROCEDURE — 83540 ASSAY OF IRON: CPT | Performed by: PATHOLOGY

## 2022-09-15 PROCEDURE — 83550 IRON BINDING TEST: CPT | Performed by: PATHOLOGY

## 2022-09-15 PROCEDURE — 85045 AUTOMATED RETICULOCYTE COUNT: CPT | Performed by: PATHOLOGY

## 2022-09-15 PROCEDURE — G0463 HOSPITAL OUTPT CLINIC VISIT: HCPCS

## 2022-09-15 PROCEDURE — 85027 COMPLETE CBC AUTOMATED: CPT | Performed by: PATHOLOGY

## 2022-09-15 PROCEDURE — 82248 BILIRUBIN DIRECT: CPT | Performed by: PATHOLOGY

## 2022-09-15 RX ORDER — CHOLECALCIFEROL (VITAMIN D3) 50 MCG
1 TABLET ORAL DAILY
Qty: 180 TABLET | Refills: 3 | Status: ON HOLD | OUTPATIENT
Start: 2022-09-15 | End: 2022-11-10

## 2022-09-15 ASSESSMENT — PAIN SCALES - GENERAL: PAINLEVEL: MILD PAIN (3)

## 2022-09-15 NOTE — TELEPHONE ENCOUNTER
I received an e-mail from Susan indicating that she is ready to complete a health care directive. I provided information regarding a health care directive and sent her a copy via e-mail.

## 2022-09-15 NOTE — LETTER
9/15/2022         RE: Susan Puckett  [address] [address]        Dear Colleague,    Thank you for referring your patient, Susan Puckett, to the [facility]PAT[facility] [place]. Please see a copy of my visit note below.      HISTORY OF PRESENT ILLNESS:  I had the pleasure of seeing Susan Puckett for followup in the Liver Transplant Clinic at the [facility] on 09/15/2022.  Ms. 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  She is status post gastric bypass surgery.    For the most part, she is doing okay.  She does have problems with ongoing anemia that is probably multifactorial, including a low serum haptoglobin which may indicate some degree of hemolysis, as well as past problems with iron deficiency.  She did see a hematologist recently at [facility] who also thought she may have some degree of anemia of chronic disease.    At present, she does note some abdominal bloating.  She does complain of diffuse itching.  She also does have a moderate amount of fatigue.  She denies any lower extremity edema.  She has not had any gastrointestinal bleeding or any recent overt signs of hepatic encephalopathy.    She denies any fevers or chills or cough.  She denies shortness of breath.  She does feel cold all the time, which is quite common in people with cirrhosis.  She did have COVID back in early August.  She denies any nausea or vomiting and is having about 2 bowel movements per day.  Her appetite has been good.  For the most part, her weight has remained stable.    There otherwise have been no other new events since she was last seen.    Current Outpatient Medications   Medication     acetaminophen (TYLENOL) 325 MG tablet     amylase-lipase-protease (CREON 24) 78097-28339 units CPEP per EC capsule     amylase-lipase-protease (CREON 24) 50869-04789 units CPEP  per EC capsule     atorvastatin (LIPITOR) 20 MG tablet     calcium carbonate-vitamin D (OSCAL W/D) 500-200 MG-UNIT tablet     CALCIUM CITRATE PO     Continuous Blood Gluc  (DEXCOM G6 ) LUNA     Continuous Blood Gluc Sensor (DEXCOM G6 SENSOR) MISC     Continuous Blood Gluc Transmit (DEXCOM G6 TRANSMITTER) MISC     cyanocobalamin (VITAMIN B-12) 1000 MCG tablet     escitalopram (LEXAPRO) 10 MG tablet     famotidine (PEPCID) 20 MG tablet     folic acid (FOLVITE) 1 MG tablet     furosemide (LASIX) 40 MG tablet     insulin aspart (NOVOPEN ECHO) 100 UNIT/ML cartridge     insulin glargine (LANTUS PEN) 100 UNIT/ML pen     insulin pen needle (BD GRISEL U/F) 32G X 4 MM miscellaneous     lactulose (CHRONULAC) 10 GM/15ML solution     levothyroxine (SYNTHROID/LEVOTHROID) 175 MCG tablet     melatonin 5 MG tablet     multivitamin CF FORMULA (DEKAS PLUS) capsule     ondansetron (ZOFRAN ODT) 8 MG ODT tab     pregabalin (LYRICA) 25 MG capsule     prochlorperazine (COMPAZINE) 10 MG tablet     rifampin (RIFADIN) 300 MG capsule     rizatriptan (MAXALT) 10 MG tablet     spironolactone (ALDACTONE) 50 MG tablet     topiramate (TOPAMAX) 50 MG tablet     traZODone (DESYREL) 100 MG tablet     valACYclovir (VALTREX) 1000 mg tablet     vitamin A 3 MG (34632 UNITS) capsule     vitamin C (ASCORBIC ACID) 1000 MG TABS     vitamin D3 (CHOLECALCIFEROL) 50 mcg (2000 units) tablet     vitamin E (TOCOPHEROL) 400 units (180 mg) capsule     XIFAXAN 550 MG TABS tablet     No current facility-administered medications for this visit.     /71   Pulse 68   Temp 98.1  F (36.7  C)   Wt 78.2 kg (172 lb 8 oz)   SpO2 100%   BMI 27.84 kg/m      PHYSICAL EXAMINATION:    GENERAL:  She actually looks well.  HEENT:  No scleral icterus or temporal muscle wasting.  CHEST:  Clear.  ABDOMEN:  No increase in girth.  No masses or tenderness to palpation is present.  Her liver is 10 cm in span without left lobe enlargement.  No spleen tip is  palpable.  EXTREMITIES:  No edema.  SKIN:  No stigmata of chronic liver disease or chronic pruritus.  NEUROLOGIC:  No asterixis.    Recent Results (from the past 168 hour(s))   CBC with platelets    Collection Time: 09/15/22  7:35 AM   Result Value Ref Range    WBC Count 3.1 (L) 4.0 - 11.0 10e3/uL    RBC Count 2.71 (L) 3.80 - 5.20 10e6/uL    Hemoglobin 9.2 (L) 11.7 - 15.7 g/dL    Hematocrit 28.5 (L) 35.0 - 47.0 %     (H) 78 - 100 fL    MCH 33.9 (H) 26.5 - 33.0 pg    MCHC 32.3 31.5 - 36.5 g/dL    RDW 16.0 (H) 10.0 - 15.0 %    Platelet Count 101 (L) 150 - 450 10e3/uL   Basic metabolic panel    Collection Time: 09/15/22  7:35 AM   Result Value Ref Range    Sodium 140 136 - 145 mmol/L    Potassium 4.2 3.4 - 5.3 mmol/L    Chloride 107 98 - 107 mmol/L    Carbon Dioxide (CO2) 23 22 - 29 mmol/L    Anion Gap 10 7 - 15 mmol/L    Urea Nitrogen 13.3 6.0 - 20.0 mg/dL    Creatinine 1.02 (H) 0.51 - 0.95 mg/dL    Calcium 8.7 8.6 - 10.0 mg/dL    Glucose 331 (H) 70 - 99 mg/dL    GFR Estimate 67 >60 mL/min/1.73m2   Hepatic function panel    Collection Time: 09/15/22  7:35 AM   Result Value Ref Range    Protein Total 5.7 (L) 6.4 - 8.3 g/dL    Albumin 2.6 (L) 3.5 - 5.2 g/dL    Bilirubin Total 1.4 (H) <=1.2 mg/dL    Alkaline Phosphatase 91 35 - 104 U/L    AST 35 10 - 35 U/L    ALT <5 (L) 10 - 35 U/L    Bilirubin Direct 0.65 (H) 0.00 - 0.30 mg/dL   Iron and iron binding capacity    Collection Time: 09/15/22  7:35 AM   Result Value Ref Range    Iron 58 37 - 145 ug/dL    Iron Sat Index 30 15 - 46 %    Iron Binding Capacity 191 (L) 240 - 430 ug/dL   Vitamin B12    Collection Time: 09/15/22  7:35 AM   Result Value Ref Range    Vitamin B12 786 232 - 1,245 pg/mL   Reticulocyte count    Collection Time: 09/15/22  7:35 AM   Result Value Ref Range    % Reticulocyte 3.3 (H) 0.5 - 2.0 %    Absolute Reticulocyte 0.088 0.025 - 0.095 10e6/uL      MELD-Na score: 14 at 8/23/2022  1:35 PM  MELD score: 14 at 8/23/2022  1:35 PM  Calculated  from:  Serum Creatinine: 1.08 mg/dL at 8/23/2022  1:35 PM  Serum Sodium: 137 mmol/L at 8/23/2022  1:35 PM  Total Bilirubin: 2.0 mg/dL at 8/23/2022  1:35 PM  INR(ratio): 1.4 at 8/23/2022  1:35 PM  Age: 52 years      IMPRESSION:  Ms. Puckett has cirrhosis caused by nonalcoholic fatty liver disease. She also is in the process of finalizing an anemia workup here as well.  Her hemoglobin is 9.2.    For the most part, she is up to date with regard to the issues surrounding her liver disease.  She will get imaging of her liver in early October.  I did encourage her to get the Omicron-specific vaccine.  Unfortunately, it is not available here in clinic today.  Otherwise, I will see her back in the clinic again in 3 months.    Thank you very much for allowing me to participate in the care of this patient.  If you have any questions regarding my recommendations, please do not hesitate to contact me.         Raphael Cook MD      Professor of Medicine  [employer]      Executive Medical Director, Solid Organ Transplant Program  [facility]        Again, thank you for allowing me to participate in the care of your patient.        Sincerely,        Raphael Cook MD

## 2022-09-15 NOTE — PROGRESS NOTES
HISTORY OF PRESENT ILLNESS:  I had the pleasure of seeing Susan Puckett for followup in the Liver Transplant Clinic at the [facility] on 09/15/2022.  Ms. 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  She is status post gastric bypass surgery.    For the most part, she is doing okay.  She does have problems with ongoing anemia that is probably multifactorial, including a low serum haptoglobin which may indicate some degree of hemolysis, as well as past problems with iron deficiency.  She did see a hematologist recently at [facility] who also thought she may have some degree of anemia of chronic disease.    At present, she does note some abdominal bloating.  She does complain of diffuse itching.  She also does have a moderate amount of fatigue.  She denies any lower extremity edema.  She has not had any gastrointestinal bleeding or any recent overt signs of hepatic encephalopathy.    She denies any fevers or chills or cough.  She denies shortness of breath.  She does feel cold all the time, which is quite common in people with cirrhosis.  She did have COVID back in early August.  She denies any nausea or vomiting and is having about 2 bowel movements per day.  Her appetite has been good.  For the most part, her weight has remained stable.    There otherwise have been no other new events since she was last seen.    Current Outpatient Medications   Medication     acetaminophen (TYLENOL) 325 MG tablet     amylase-lipase-protease (CREON 24) 67343-98191 units CPEP per EC capsule     amylase-lipase-protease (CREON 24) 44390-05108 units CPEP per EC capsule     atorvastatin (LIPITOR) 20 MG tablet     calcium carbonate-vitamin D (OSCAL W/D) 500-200 MG-UNIT tablet     CALCIUM CITRATE PO     Continuous Blood Gluc  (DEXCOM G6 ) LUNA     Continuous Blood Gluc Sensor (DEXCOM G6 SENSOR) MISC      Continuous Blood Gluc Transmit (DEXCOM G6 TRANSMITTER) MISC     cyanocobalamin (VITAMIN B-12) 1000 MCG tablet     escitalopram (LEXAPRO) 10 MG tablet     famotidine (PEPCID) 20 MG tablet     folic acid (FOLVITE) 1 MG tablet     furosemide (LASIX) 40 MG tablet     insulin aspart (NOVOPEN ECHO) 100 UNIT/ML cartridge     insulin glargine (LANTUS PEN) 100 UNIT/ML pen     insulin pen needle (BD GRISEL U/F) 32G X 4 MM miscellaneous     lactulose (CHRONULAC) 10 GM/15ML solution     levothyroxine (SYNTHROID/LEVOTHROID) 175 MCG tablet     melatonin 5 MG tablet     multivitamin CF FORMULA (DEKAS PLUS) capsule     ondansetron (ZOFRAN ODT) 8 MG ODT tab     pregabalin (LYRICA) 25 MG capsule     prochlorperazine (COMPAZINE) 10 MG tablet     rifampin (RIFADIN) 300 MG capsule     rizatriptan (MAXALT) 10 MG tablet     spironolactone (ALDACTONE) 50 MG tablet     topiramate (TOPAMAX) 50 MG tablet     traZODone (DESYREL) 100 MG tablet     valACYclovir (VALTREX) 1000 mg tablet     vitamin A 3 MG (17927 UNITS) capsule     vitamin C (ASCORBIC ACID) 1000 MG TABS     vitamin D3 (CHOLECALCIFEROL) 50 mcg (2000 units) tablet     vitamin E (TOCOPHEROL) 400 units (180 mg) capsule     XIFAXAN 550 MG TABS tablet     No current facility-administered medications for this visit.     /71   Pulse 68   Temp 98.1  F (36.7  C)   Wt 78.2 kg (172 lb 8 oz)   SpO2 100%   BMI 27.84 kg/m      PHYSICAL EXAMINATION:    GENERAL:  She actually looks well.  HEENT:  No scleral icterus or temporal muscle wasting.  CHEST:  Clear.  ABDOMEN:  No increase in girth.  No masses or tenderness to palpation is present.  Her liver is 10 cm in span without left lobe enlargement.  No spleen tip is palpable.  EXTREMITIES:  No edema.  SKIN:  No stigmata of chronic liver disease or chronic pruritus.  NEUROLOGIC:  No asterixis.    Recent Results (from the past 168 hour(s))   CBC with platelets    Collection Time: 09/15/22  7:35 AM   Result Value Ref Range    WBC Count  3.1 (L) 4.0 - 11.0 10e3/uL    RBC Count 2.71 (L) 3.80 - 5.20 10e6/uL    Hemoglobin 9.2 (L) 11.7 - 15.7 g/dL    Hematocrit 28.5 (L) 35.0 - 47.0 %     (H) 78 - 100 fL    MCH 33.9 (H) 26.5 - 33.0 pg    MCHC 32.3 31.5 - 36.5 g/dL    RDW 16.0 (H) 10.0 - 15.0 %    Platelet Count 101 (L) 150 - 450 10e3/uL   Basic metabolic panel    Collection Time: 09/15/22  7:35 AM   Result Value Ref Range    Sodium 140 136 - 145 mmol/L    Potassium 4.2 3.4 - 5.3 mmol/L    Chloride 107 98 - 107 mmol/L    Carbon Dioxide (CO2) 23 22 - 29 mmol/L    Anion Gap 10 7 - 15 mmol/L    Urea Nitrogen 13.3 6.0 - 20.0 mg/dL    Creatinine 1.02 (H) 0.51 - 0.95 mg/dL    Calcium 8.7 8.6 - 10.0 mg/dL    Glucose 331 (H) 70 - 99 mg/dL    GFR Estimate 67 >60 mL/min/1.73m2   Hepatic function panel    Collection Time: 09/15/22  7:35 AM   Result Value Ref Range    Protein Total 5.7 (L) 6.4 - 8.3 g/dL    Albumin 2.6 (L) 3.5 - 5.2 g/dL    Bilirubin Total 1.4 (H) <=1.2 mg/dL    Alkaline Phosphatase 91 35 - 104 U/L    AST 35 10 - 35 U/L    ALT <5 (L) 10 - 35 U/L    Bilirubin Direct 0.65 (H) 0.00 - 0.30 mg/dL   Iron and iron binding capacity    Collection Time: 09/15/22  7:35 AM   Result Value Ref Range    Iron 58 37 - 145 ug/dL    Iron Sat Index 30 15 - 46 %    Iron Binding Capacity 191 (L) 240 - 430 ug/dL   Vitamin B12    Collection Time: 09/15/22  7:35 AM   Result Value Ref Range    Vitamin B12 786 232 - 1,245 pg/mL   Reticulocyte count    Collection Time: 09/15/22  7:35 AM   Result Value Ref Range    % Reticulocyte 3.3 (H) 0.5 - 2.0 %    Absolute Reticulocyte 0.088 0.025 - 0.095 10e6/uL      MELD-Na score: 14 at 8/23/2022  1:35 PM  MELD score: 14 at 8/23/2022  1:35 PM  Calculated from:  Serum Creatinine: 1.08 mg/dL at 8/23/2022  1:35 PM  Serum Sodium: 137 mmol/L at 8/23/2022  1:35 PM  Total Bilirubin: 2.0 mg/dL at 8/23/2022  1:35 PM  INR(ratio): 1.4 at 8/23/2022  1:35 PM  Age: 52 years      IMPRESSION:  Ms. Puckett has cirrhosis caused by nonalcoholic fatty  liver disease. She also is in the process of finalizing an anemia workup here as well.  Her hemoglobin is 9.2.    For the most part, she is up to date with regard to the issues surrounding her liver disease.  She will get imaging of her liver in early October.  I did encourage her to get the Omicron-specific vaccine.  Unfortunately, it is not available here in clinic today.  Otherwise, I will see her back in the clinic again in 3 months.    Thank you very much for allowing me to participate in the care of this patient.  If you have any questions regarding my recommendations, please do not hesitate to contact me.         Raphael Cook MD      Professor of Medicine  [employer]      Executive Medical Director, Solid Organ Transplant Program  [facility]

## 2022-09-16 ENCOUNTER — PATIENT OUTREACH (OUTPATIENT)
Dept: GASTROENTEROLOGY | Facility: CLINIC | Age: 52
End: 2022-09-16

## 2022-09-16 NOTE — PROGRESS NOTES
Pt had hepatology follow up on 9/15. Plan established at clinic visit per Dr. Cook:    1) Continue current diuretics- furosemide 60 mg daily and spironolactone 150 mg daily  2) Continue lactulose and xifaxan- Titrate to achieve 3-5 BMs/day  3) HCC screening due in 10/2022  4) Recommend omicron-specific COVID booster- Provided pt with the web site to schedule a vaccine appointment  5) RTC in 3 months- Scheduled 12/18/22    Met with pt at appointment to check in and review plan of care. Will check in with pt in 2-3 weeks. Pt verbalized understanding and is agreeable to plan of care.

## 2022-09-22 ENCOUNTER — TELEPHONE (OUTPATIENT)
Dept: TRANSPLANT | Facility: CLINIC | Age: 52
End: 2022-09-22

## 2022-09-22 NOTE — TELEPHONE ENCOUNTER
Transplant Social Work Services Phone Call    Data: I received a voice message form Susan. She reported that she has some questions about selling her that has a loan d/t their budget. I called her back and reported that this is not within my realm. I advised her to call her lender and/or car dealership that they bought it from. She reported that she does have a call out to [employer]. This is where the bought the car and have the loan though.   Intervention: Supportive counseling   Assessment: No new assessment   Education provided by SW: Indicated this is not within my realm   Plan: This writer will be available for ongoing psychosocial support    RENARD Alvarado, [facility]  Liver Transplant   [facility]  Phone: [phone]  Pager: [phone]

## 2022-10-06 ENCOUNTER — PATIENT OUTREACH (OUTPATIENT)
Dept: GASTROENTEROLOGY | Facility: CLINIC | Age: 52
End: 2022-10-06

## 2022-10-06 DIAGNOSIS — L29.9 ITCHING: ICD-10-CM

## 2022-10-06 DIAGNOSIS — K75.81 LIVER CIRRHOSIS SECONDARY TO NASH (H): ICD-10-CM

## 2022-10-06 DIAGNOSIS — K74.60 LIVER CIRRHOSIS SECONDARY TO NASH (H): ICD-10-CM

## 2022-10-06 RX ORDER — RIFAMPIN 300 MG/1
300 CAPSULE ORAL 2 TIMES DAILY
Qty: 180 CAPSULE | Refills: 3 | Status: ON HOLD | OUTPATIENT
Start: 2022-10-06 | End: 2022-11-10

## 2022-10-10 ENCOUNTER — HEALTH MAINTENANCE LETTER (OUTPATIENT)
Age: 52
End: 2022-10-10

## 2022-10-18 ENCOUNTER — ANCILLARY PROCEDURE (OUTPATIENT)
Dept: MRI IMAGING | Facility: CLINIC | Age: 52
End: 2022-10-18
Attending: INTERNAL MEDICINE
Payer: COMMERCIAL

## 2022-10-18 DIAGNOSIS — K74.60 LIVER CIRRHOSIS SECONDARY TO NASH (H): ICD-10-CM

## 2022-10-18 DIAGNOSIS — K75.81 LIVER CIRRHOSIS SECONDARY TO NASH (H): ICD-10-CM

## 2022-10-18 PROCEDURE — 74183 MRI ABD W/O CNTR FLWD CNTR: CPT | Mod: GC | Performed by: RADIOLOGY

## 2022-10-18 PROCEDURE — A9585 GADOBUTROL INJECTION: HCPCS | Performed by: RADIOLOGY

## 2022-10-18 RX ORDER — GADOBUTROL 604.72 MG/ML
7.5 INJECTION INTRAVENOUS ONCE
Status: COMPLETED | OUTPATIENT
Start: 2022-10-18 | End: 2022-10-18

## 2022-10-18 RX ADMIN — GADOBUTROL 7.5 ML: 604.72 INJECTION INTRAVENOUS at 08:38

## 2022-10-18 NOTE — DISCHARGE INSTRUCTIONS
MRI Contrast Discharge Instructions    The IV contrast you received today will pass out of your body in your  urine. This will happen in the next 24 hours. You will not feel this process.  Your urine will not change color.    Drink at least 4 extra glasses of water or juice today (unless your doctor  has restricted your fluids). This reduces the stress on your kidneys.  You may take your regular medicines.    If you are on dialysis: It is best to have dialysis today.    If you have a reaction: Most reactions happen right away. If you have  any new symptoms after leaving the hospital (such as hives or swelling),  call your hospital at the correct number below. Or call your family doctor.  If you have breathing distress or wheezing, call 911.    Special instructions: ***    I have read and understand the above information.    Signature:______________________________________ Date:___________    Staff:__________________________________________ Date:___________     Time:__________    [place] [facility] Departments:    ___[place]: [phone]  ___[facility]: [phone]  ___[place]: [phone] ___[facility]: [phone]  ___[facility]: [phone]  ___[facility]: [phone]  ___[phone]  ___[facility]: [phone]  ___[phone]

## 2022-10-19 ENCOUNTER — PATIENT OUTREACH (OUTPATIENT)
Dept: GASTROENTEROLOGY | Facility: CLINIC | Age: 52
End: 2022-10-19

## 2022-10-19 NOTE — PROGRESS NOTES
Pt reported pitting edema starting her feet and going up to her lower abdomen. Pt reported compliance with diuretics (furosemide 60 mg by mouth daily and spironolactone 150 mg by mouth daily), following low Na diet, wearing compression stockings, and elevating lower extremities throughout the day. Pt stated that the swelling is very uncomfortable and her legs are swollen to the point where the compression stockings do not fit properly. Discussed that pt is already on high doses of water pills and Dr. Cook would like to try lymphedema therapy. Pt stated that she already received a call from the lymphedema clinic in [place] and has her first appointment on 11/16. Encouraged pt to continue the interventions that she is already doing for the edema and to notify the clinic if symptoms worsen.     Pt expressed concern with medication coverage when pt switches insurance in 2023. Pt asked if there are any programs available to help with the costs of xifaxan. Discussed that the  of xifaxan ([employer]) has a patient assistance program that pt has to apply to. Reviewed that program eligibility is based off of income and out of pocket cost. Pt will notify writer if she would like to apply to the program and needs assistance with the application.    Will check in with pt in 2-3 weeks. Pt verbalized understanding and is agreeable to plan of care.

## 2022-10-23 ENCOUNTER — TELEPHONE (OUTPATIENT)
Dept: TRANSPLANT | Facility: CLINIC | Age: 52
End: 2022-10-23

## 2022-10-23 NOTE — TELEPHONE ENCOUNTER
Shannan, paged to report that yesterday she went to [place] and went to [place] and ate at [employer] restaurant. She had 5 stools yesterday. She only took 2 doses of lactulose. She feels run down. She ate food from a restaurant. Family is not feeling ill or having diarrhea. She has gone 3 times today. She will stay hydrated. She will call back if diarrhea continues. Shannan aware can discuss possible stool cultures if needed.   shannan wondering when due for labs next. She stated [facility] told her, but she forgot.

## 2022-10-23 NOTE — TELEPHONE ENCOUNTER
Pt paged that she had 6 BMs today since 8 am. 3 bigger ones and 3 smaller ones. Pt drinking water, does not feel dehydrated. Pt did not take lactulose today. No confusion or fogginess. Pt has been eating ok without issues. Pt ate eggs, grilled cheese, soup, apple and banana. Pt will do bland/brat diet for the rest of evening. No fevers.   Spoke with pt's , no confusion or concerns on his end. They will watch for fever. Pt's  is not feeling ill.   If pt continues to have diarrhea and needs stool cultures would want orders faxed to [facility].

## 2022-10-24 ENCOUNTER — TELEPHONE (OUTPATIENT)
Dept: TRANSPLANT | Facility: CLINIC | Age: 52
End: 2022-10-24

## 2022-10-24 NOTE — TELEPHONE ENCOUNTER
Spoke with Susan and  Roly- pt still having 3-4 BMs a day depsite not taking lactulose.  Advised OK to hold if 2-4 soft to liquid BMs a day AND her thinking is clear, no signs of HE  (was also informed of this by on call coordinator over the weekend.)  No fevers, no other new sx.     Patient's  also on phone, was concerned about her having another HE episode.  Will continue bland diet and when stool output decreases will titrate lactulose to achieve 2-4 BMs a day.      Consider stool cx if proceeds to ongoing diarrhea.

## 2022-10-25 ENCOUNTER — PATIENT OUTREACH (OUTPATIENT)
Dept: GASTROENTEROLOGY | Facility: CLINIC | Age: 52
End: 2022-10-25

## 2022-10-25 ENCOUNTER — TELEPHONE (OUTPATIENT)
Dept: TRANSPLANT | Facility: CLINIC | Age: 52
End: 2022-10-25

## 2022-10-25 DIAGNOSIS — K74.60 LIVER CIRRHOSIS SECONDARY TO NASH (H): Primary | ICD-10-CM

## 2022-10-25 DIAGNOSIS — R19.7 DIARRHEA: Primary | ICD-10-CM

## 2022-10-25 DIAGNOSIS — K75.81 LIVER CIRRHOSIS SECONDARY TO NASH (H): Primary | ICD-10-CM

## 2022-10-25 NOTE — TELEPHONE ENCOUNTER
shannan lvm, still loose stool despite no lactulose    Order for C Diff placed, Eli in clinic to follow up with patient    See other encounter

## 2022-10-25 NOTE — PROGRESS NOTES
Pt reported multiple loose stools since 10/22 without taking lactulose. Pt expressed concern with not taking lactulose and developing hepatic encephalopathy. Reviewed [facility] pt that she can go without lactulose as long as she is having 3-5 BMs/day and not experiencing any confusion. Reiterated the importance of staying hydrated and trying to follow the BRAT diet while having GI symptoms. Stool culture ordered and faxed to pt's PCP.     Will check in with pt in 2-3 days. Encouraged pt to call with any questions or concerns. Pt verbalized understanding and is agreeable to plan of care.

## 2022-10-29 NOTE — PROGRESS NOTES
Pt notified writer that she had labs drawn at primary care office today. Pt still needs to proivde a stool sample and stated that diarrhea has slightly improved. Discussed lab results and that some results were still pending. Pt complained of increased fatigue. Reviewed that pt's iron level is low and this is likely contributing to fatigue. Pt stated that she was instructed to stop oral iron supplement by hematology team and will follow up with them regarding iron labs. Pt stated that lower extremity edema is not improving and pt is now experiencing swelling in her labia. Instructed pt to continue to adhere to low Na diet, take diuretics as prescribed, and elevated legs throughout the day. Will check in with pt in 10/31 and reach out to Dr. Cook for further recommendations if swelling has not decreased. Pt verbalized understanding and is agreeable to plan of care.

## 2022-10-31 ENCOUNTER — TELEPHONE (OUTPATIENT)
Dept: TRANSPLANT | Facility: CLINIC | Age: 52
End: 2022-10-31

## 2022-10-31 ENCOUNTER — ORGAN (OUTPATIENT)
Dept: TRANSPLANT | Facility: CLINIC | Age: 52
End: 2022-10-31

## 2022-10-31 NOTE — TELEPHONE ENCOUNTER
Received a call from Susan    Per her report, she has a family friend that has a nephew on life support undergoing brain death testing.  Family interested in directed donation if she is a match.      Notified Dr. Correa, on call coordinator notified by Sindy Wilson.

## 2022-10-31 NOTE — PROGRESS NOTES
Called pt to check in on fluid accumulation. Pt stated that the swelling has not improved and pt's abdomen is hard and distended. Pt requested an abdominal ultrasound to assess for ascites. Reviewed that pt's MRI on 10/18 only noted trace perihepatic ascites. Discussed pt's worsening fluid accumulation and request for an abdominal ultrasound with Dr. Cook. Abdominal ultrasound ordered and faxed to [facility] per pt request.

## 2022-11-01 ENCOUNTER — APPOINTMENT (OUTPATIENT)
Dept: GENERAL RADIOLOGY | Facility: CLINIC | Age: 52
DRG: 005 | End: 2022-11-01
Attending: PHYSICIAN ASSISTANT
Payer: COMMERCIAL

## 2022-11-01 ENCOUNTER — ANESTHESIA EVENT (OUTPATIENT)
Dept: SURGERY | Facility: CLINIC | Age: 52
DRG: 005 | End: 2022-11-01
Payer: COMMERCIAL

## 2022-11-01 ENCOUNTER — HOSPITAL ENCOUNTER (INPATIENT)
Facility: CLINIC | Age: 52
LOS: 7 days | Discharge: NURSING, REHAB OR LONG-TERM CARE | DRG: 005 | End: 2022-11-10
Attending: TRANSPLANT SURGERY | Admitting: TRANSPLANT SURGERY
Payer: COMMERCIAL

## 2022-11-01 ENCOUNTER — TELEPHONE (OUTPATIENT)
Dept: TRANSPLANT | Facility: CLINIC | Age: 52
End: 2022-11-01

## 2022-11-01 DIAGNOSIS — Z94.4 LIVER REPLACED BY TRANSPLANT (H): Primary | ICD-10-CM

## 2022-11-01 DIAGNOSIS — G89.18 ACUTE POST-OPERATIVE PAIN: ICD-10-CM

## 2022-11-01 DIAGNOSIS — Z98.84 HISTORY OF GASTRIC BYPASS: ICD-10-CM

## 2022-11-01 DIAGNOSIS — F41.9 ANXIETY: ICD-10-CM

## 2022-11-01 DIAGNOSIS — E11.9 TYPE 2 DIABETES MELLITUS WITHOUT COMPLICATION, WITHOUT LONG-TERM CURRENT USE OF INSULIN (H): ICD-10-CM

## 2022-11-01 DIAGNOSIS — K86.89 PANCREATIC INSUFFICIENCY: ICD-10-CM

## 2022-11-01 DIAGNOSIS — E83.42 HYPOMAGNESEMIA: ICD-10-CM

## 2022-11-01 DIAGNOSIS — E83.39 HYPOPHOSPHATEMIA: ICD-10-CM

## 2022-11-01 LAB
ABO/RH(D): NORMAL
ALBUMIN UR-MCNC: NEGATIVE MG/DL
ANTIBODY SCREEN: NEGATIVE
APPEARANCE UR: CLEAR
APTT PPP: 42 SECONDS (ref 22–38)
BACTERIA #/AREA URNS HPF: ABNORMAL /HPF
BILIRUB UR QL STRIP: NEGATIVE
BLD PROD TYP BPU: NORMAL
BLOOD COMPONENT TYPE: NORMAL
CODING SYSTEM: NORMAL
COLOR UR AUTO: YELLOW
CROSSMATCH: NORMAL
CYCLE THRESHOLD (CT): 39.7
FIBRINOGEN PPP-MCNC: 162 MG/DL (ref 170–490)
GLUCOSE UR STRIP-MCNC: NEGATIVE MG/DL
HGB UR QL STRIP: NEGATIVE
INR PPP: 1.82 (ref 0.85–1.15)
ISSUE DATE AND TIME: NORMAL
KETONES UR STRIP-MCNC: NEGATIVE MG/DL
LEUKOCYTE ESTERASE UR QL STRIP: NEGATIVE
NITRATE UR QL: NEGATIVE
PH UR STRIP: 7 [PH] (ref 5–7)
RBC URINE: 1 /HPF
SARS-COV-2 RNA RESP QL NAA+PROBE: POSITIVE
SP GR UR STRIP: 1.01 (ref 1–1.03)
SPECIMEN EXPIRATION DATE: NORMAL
SQUAMOUS EPITHELIAL: 2 /HPF
UNIT ABO/RH: NORMAL
UNIT NUMBER: NORMAL
UNIT STATUS: NORMAL
UNIT TYPE ISBT: 2800
UNIT TYPE ISBT: 5100
UNIT TYPE ISBT: 6200
UNIT TYPE ISBT: 8400
UNIT TYPE ISBT: 8400
UROBILINOGEN UR STRIP-MCNC: NORMAL MG/DL
WBC URINE: 1 /HPF

## 2022-11-01 PROCEDURE — 93005 ELECTROCARDIOGRAM TRACING: CPT

## 2022-11-01 PROCEDURE — 86644 CMV ANTIBODY: CPT | Performed by: PHYSICIAN ASSISTANT

## 2022-11-01 PROCEDURE — 36415 COLL VENOUS BLD VENIPUNCTURE: CPT

## 2022-11-01 PROCEDURE — 86901 BLOOD TYPING SEROLOGIC RH(D): CPT | Performed by: PHYSICIAN ASSISTANT

## 2022-11-01 PROCEDURE — 86706 HEP B SURFACE ANTIBODY: CPT | Performed by: PHYSICIAN ASSISTANT

## 2022-11-01 PROCEDURE — U0005 INFEC AGEN DETEC AMPLI PROBE: HCPCS | Performed by: PHYSICIAN ASSISTANT

## 2022-11-01 PROCEDURE — 85041 AUTOMATED RBC COUNT: CPT | Performed by: PHYSICIAN ASSISTANT

## 2022-11-01 PROCEDURE — 83036 HEMOGLOBIN GLYCOSYLATED A1C: CPT | Performed by: NURSE PRACTITIONER

## 2022-11-01 PROCEDURE — 86665 EPSTEIN-BARR CAPSID VCA: CPT | Performed by: PHYSICIAN ASSISTANT

## 2022-11-01 PROCEDURE — 86850 RBC ANTIBODY SCREEN: CPT | Performed by: PHYSICIAN ASSISTANT

## 2022-11-01 PROCEDURE — 87521 HEPATITIS C PROBE&RVRS TRNSC: CPT | Performed by: PHYSICIAN ASSISTANT

## 2022-11-01 PROCEDURE — 87340 HEPATITIS B SURFACE AG IA: CPT | Performed by: PHYSICIAN ASSISTANT

## 2022-11-01 PROCEDURE — 85384 FIBRINOGEN ACTIVITY: CPT | Performed by: PHYSICIAN ASSISTANT

## 2022-11-01 PROCEDURE — 71046 X-RAY EXAM CHEST 2 VIEWS: CPT | Mod: 26 | Performed by: RADIOLOGY

## 2022-11-01 PROCEDURE — 250N000013 HC RX MED GY IP 250 OP 250 PS 637

## 2022-11-01 PROCEDURE — 86704 HEP B CORE ANTIBODY TOTAL: CPT | Performed by: PHYSICIAN ASSISTANT

## 2022-11-01 PROCEDURE — 81001 URINALYSIS AUTO W/SCOPE: CPT | Performed by: PHYSICIAN ASSISTANT

## 2022-11-01 PROCEDURE — 80053 COMPREHEN METABOLIC PANEL: CPT | Performed by: PHYSICIAN ASSISTANT

## 2022-11-01 PROCEDURE — 82150 ASSAY OF AMYLASE: CPT | Performed by: PHYSICIAN ASSISTANT

## 2022-11-01 PROCEDURE — 85730 THROMBOPLASTIN TIME PARTIAL: CPT | Performed by: PHYSICIAN ASSISTANT

## 2022-11-01 PROCEDURE — 85610 PROTHROMBIN TIME: CPT | Performed by: PHYSICIAN ASSISTANT

## 2022-11-01 PROCEDURE — 83735 ASSAY OF MAGNESIUM: CPT | Performed by: PHYSICIAN ASSISTANT

## 2022-11-01 PROCEDURE — 86803 HEPATITIS C AB TEST: CPT | Performed by: PHYSICIAN ASSISTANT

## 2022-11-01 PROCEDURE — 87389 HIV-1 AG W/HIV-1&-2 AB AG IA: CPT | Performed by: PHYSICIAN ASSISTANT

## 2022-11-01 PROCEDURE — 36415 COLL VENOUS BLD VENIPUNCTURE: CPT | Performed by: PHYSICIAN ASSISTANT

## 2022-11-01 PROCEDURE — 84100 ASSAY OF PHOSPHORUS: CPT | Performed by: PHYSICIAN ASSISTANT

## 2022-11-01 PROCEDURE — 71046 X-RAY EXAM CHEST 2 VIEWS: CPT

## 2022-11-01 PROCEDURE — 86923 COMPATIBILITY TEST ELECTRIC: CPT

## 2022-11-01 PROCEDURE — 999N000248 HC STATISTIC IV INSERT WITH US BY RN

## 2022-11-01 PROCEDURE — 87081 CULTURE SCREEN ONLY: CPT | Performed by: PHYSICIAN ASSISTANT

## 2022-11-01 PROCEDURE — 86645 CMV ANTIBODY IGM: CPT | Performed by: PHYSICIAN ASSISTANT

## 2022-11-01 PROCEDURE — 93010 ELECTROCARDIOGRAM REPORT: CPT | Mod: 59 | Performed by: INTERNAL MEDICINE

## 2022-11-01 RX ORDER — ALBUTEROL SULFATE 90 UG/1
1-2 AEROSOL, METERED RESPIRATORY (INHALATION)
Status: DISCONTINUED | OUTPATIENT
Start: 2022-11-01 | End: 2022-11-03

## 2022-11-01 RX ORDER — MEPERIDINE HYDROCHLORIDE 25 MG/ML
25 INJECTION INTRAMUSCULAR; INTRAVENOUS; SUBCUTANEOUS EVERY 30 MIN PRN
Status: DISCONTINUED | OUTPATIENT
Start: 2022-11-01 | End: 2022-11-04

## 2022-11-01 RX ORDER — RIFAMPIN 300 MG/1
300 CAPSULE ORAL 2 TIMES DAILY
Status: DISCONTINUED | OUTPATIENT
Start: 2022-11-01 | End: 2022-11-02

## 2022-11-01 RX ORDER — PIPERACILLIN SODIUM, TAZOBACTAM SODIUM 3; .375 G/15ML; G/15ML
3.38 INJECTION, POWDER, LYOPHILIZED, FOR SOLUTION INTRAVENOUS ONCE
Status: COMPLETED | OUTPATIENT
Start: 2022-11-01 | End: 2022-11-02

## 2022-11-01 RX ORDER — DIPHENHYDRAMINE HYDROCHLORIDE 50 MG/ML
50 INJECTION INTRAMUSCULAR; INTRAVENOUS ONCE
Status: DISCONTINUED | OUTPATIENT
Start: 2022-11-02 | End: 2022-11-02

## 2022-11-01 RX ORDER — DIPHENHYDRAMINE HYDROCHLORIDE 50 MG/ML
50 INJECTION INTRAMUSCULAR; INTRAVENOUS
Status: DISCONTINUED | OUTPATIENT
Start: 2022-11-01 | End: 2022-11-04

## 2022-11-01 RX ORDER — TRAZODONE HYDROCHLORIDE 50 MG/1
50 TABLET, FILM COATED ORAL AT BEDTIME
Status: DISCONTINUED | OUTPATIENT
Start: 2022-11-01 | End: 2022-11-03

## 2022-11-01 RX ORDER — NOREPINEPHRINE BITARTRATE 0.06 MG/ML
.01-.6 INJECTION, SOLUTION INTRAVENOUS CONTINUOUS
Status: DISCONTINUED | OUTPATIENT
Start: 2022-11-02 | End: 2022-11-02 | Stop reason: HOSPADM

## 2022-11-01 RX ORDER — PIPERACILLIN SODIUM, TAZOBACTAM SODIUM 3; .375 G/15ML; G/15ML
3.38 INJECTION, POWDER, LYOPHILIZED, FOR SOLUTION INTRAVENOUS
Status: DISCONTINUED | OUTPATIENT
Start: 2022-11-01 | End: 2022-11-02 | Stop reason: HOSPADM

## 2022-11-01 RX ORDER — RIZATRIPTAN BENZOATE 10 MG/1
10 TABLET ORAL
Status: DISCONTINUED | OUTPATIENT
Start: 2022-11-01 | End: 2022-11-10 | Stop reason: HOSPADM

## 2022-11-01 RX ORDER — LIDOCAINE 40 MG/G
CREAM TOPICAL
Status: DISCONTINUED | OUTPATIENT
Start: 2022-11-01 | End: 2022-11-02 | Stop reason: HOSPADM

## 2022-11-01 RX ORDER — TOPIRAMATE 50 MG/1
50 TABLET, FILM COATED ORAL DAILY
Status: DISCONTINUED | OUTPATIENT
Start: 2022-11-02 | End: 2022-11-03

## 2022-11-01 RX ORDER — ESCITALOPRAM OXALATE 10 MG/1
10 TABLET ORAL DAILY
Status: DISCONTINUED | OUTPATIENT
Start: 2022-11-02 | End: 2022-11-03

## 2022-11-01 RX ORDER — FLUCONAZOLE 2 MG/ML
400 INJECTION, SOLUTION INTRAVENOUS ONCE
Status: COMPLETED | OUTPATIENT
Start: 2022-11-01 | End: 2022-11-02

## 2022-11-01 RX ORDER — ALBUTEROL SULFATE 0.83 MG/ML
2.5 SOLUTION RESPIRATORY (INHALATION)
Status: DISCONTINUED | OUTPATIENT
Start: 2022-11-01 | End: 2022-11-03

## 2022-11-01 RX ADMIN — TRAZODONE HYDROCHLORIDE 50 MG: 50 TABLET ORAL at 23:29

## 2022-11-01 RX ADMIN — RIFAMPIN 300 MG: 300 CAPSULE ORAL at 21:27

## 2022-11-01 RX ADMIN — RIFAXIMIN 550 MG: 550 TABLET ORAL at 21:27

## 2022-11-01 ASSESSMENT — COPD QUESTIONNAIRES: COPD: 0

## 2022-11-01 ASSESSMENT — ENCOUNTER SYMPTOMS: SEIZURES: 1

## 2022-11-01 ASSESSMENT — ACTIVITIES OF DAILY LIVING (ADL)
ADLS_ACUITY_SCORE: 26

## 2022-11-01 ASSESSMENT — LIFESTYLE VARIABLES: TOBACCO_USE: 0

## 2022-11-01 NOTE — LETTER
Transition Communication Hand-off for Care Transitions to Next Level of Care Provider    Name: Susan Puckett  : 1970  MRN #: [id number]  Primary Care Provider: Harleen Billy     Primary Clinic: [facility] [address] [facility]   [place] [address]     Reason for Hospitalization:  Liver transplant candidate [Z76.82]  Liver transplanted (H) [Z94.4]  Admit Date/Time: 2022  5:46 PM  Discharge Date: 11/10/2022  Payor Source: Payor: [facility] / Plan: [employer] TRANSPLANT / Product Type: Indemnity /     Plan of Care Goals/Milestone Events:   Patient/family Concerns: Concerns with returning home as spouse has recent surgery and is not able to lift/phsyically assist Susan             Reason for Communication Hand-off Referral: Other Admission to TCU    Discharge Plan:       Concern for non-adherence with plan of care:   N  Discharge Needs Assessment:  Needs    Flowsheet Row Most Recent Value   Equipment Currently Used at Home cane, straight   PAS Number --  [[id number]]          Follow-up specialty is recommended: Yes    Follow-up plan:    Future Appointments   Date Time Provider Department Center   2022 10:45 AM Gino Romo, PT [facility]   2022 10:45 AM [facility]   2022 11:30 AM Alexei Alcaraz MD [facility]   2022 10:00 AM Onelia Nunes OT [facility]   2022  8:45 AM [facility]   2022  9:30 AM Alexei Alcaraz MD [facility]   2022  9:00 AM Onelia Nunes OT [facility]   2022  8:45 AM [facility]   2022  9:30 AM Alexei Alcaraz MD [facility]   2022  9:30 AM [facility]   2022 10:00 AM Delbert Morgan MD [facility]   12[facility]15[facility]2022  [facility]   12/15/2022  9:30 AM Delbert Morgan MD [facility]   12/15/2022 10:30 AM Raphael Cook MD [facility]   2023  7:00 AM  LAB [facility]   2023  7:30 AM Joey  Raphael WALTERS MD [facility]       Any outstanding tests or procedures:              Key Recommendations:      CLARISSE [place]    AVS/Discharge Summary is the source of truth; this is a helpful guide for improved communication of patient story

## 2022-11-01 NOTE — LETTER
[facility] [address] [place]  [address] [facility] [address]  [phone]    FACSIMILE TRANSMITTAL SHEET    TO: Home Health referral    _____URGENT _____REVIEW ONLY _____PLEASE COMMENT____PLEASE REPLY    NOTES/COMMENTS: Attached please find initial clinical information for Susan Puckett.  Patient s/p liver transplant.  Requesting home RN, PT, OT.  Patient will require transplant labs on M/TH - a.m. draw.   Please let me know if you are able to accept this referral.  Thanks    Vinita Ansari, RN BSN, PHN, ACM-RN  7A RN Care Coordinator  Phone: [phone]  Pager [phone]    To contact the weekend [facility] (0800 - 1630) Saturday and Sunday    Units: 4A, 4C, 4E, 5A and 5B- Pager 1: [phone]    Units: 6A, 6B, 6C, 6D- Pager 2: [phone]    Units: 7A, 7B, 7C, 7D, and 5C-Pager 3: [phone]    [facility] ([phone]) Saturday and Sunday    Units: 5 Ortho, 8A, 10 ICU, & Pediatric Units-Pager 4: [phone]                                      IF YOU DID NOT RECEIVE THE CORRECT NUMBER OF PAGES OR THE FAX DID NOT COME THROUGH CLEARLY, PLEASE CALL THE SENDER     CONFIDENTIALITY STATEMENT: Confidential information that may accompany this transmission contains protected health information under state and federal law and is legally privileged. This information is intended only for the use of the individual or entity named above and may be used only for carrying out treatment, payment or other healthcare operations. The recipient or person responsible for delivering this information is prohibited by law from disclosing this information without proper authorization to any other party, unless required to do so by law or regulation. If you are not the intended recipient, you are hereby notified that any review, dissemination, distribution, or copying of this message is strictly prohibited. If you have received this communication in error, please destroy the materials and contact us  immediately by calling the number listed above. No response indicates that the information was received by the appropriate authorized party

## 2022-11-01 NOTE — TELEPHONE ENCOUNTER
Spoke w/ Susan, she had spoke with on call coordinator and has admission team    Provided support, discussed that organ would still have to be evaluated and she would have to be evaluated on admission, discussed while though not common, discussed at times offers are canceled. She verbalizes understanding and remains optimistic.

## 2022-11-01 NOTE — LETTER
[facility] [address] [place]  [address] [facility] [address]  [phone]    FACSIMILE TRANSMITTAL SHEET    TO: Home Health referral    _____URGENT _____REVIEW ONLY _____PLEASE COMMENT____PLEASE REPLY    NOTES/COMMENTS: Attached please find initial clinical information for Susan Puckett.  Patient s/p liver transplant.  Requesting home RN, PT, OT.  Patient will require transplant labs on M/TH - a.m. draw.   Please let me know if you are able to accept this referral.  Thanks    Vinita Ansari, RN BSN, PHN, ACM-RN  7A RN Care Coordinator  Phone: [phone]  Pager [phone]    To contact the weekend [facility] (0800 - 1630) Saturday and Sunday    Units: 4A, 4C, 4E, 5A and 5B- Pager 1: [phone]    Units: 6A, 6B, 6C, 6D- Pager 2: [phone]    Units: 7A, 7B, 7C, 7D, and 5C-Pager 3: [phone]    [facility] ([phone]) Saturday and Sunday    Units: 5 Ortho, 8A, 10 ICU, & Pediatric Units-Pager 4: [phone]    11/9/2022 3:41 PM                                        IF YOU DID NOT RECEIVE THE CORRECT NUMBER OF PAGES OR THE FAX DID NOT COME THROUGH CLEARLY, PLEASE CALL THE SENDER     CONFIDENTIALITY STATEMENT: Confidential information that may accompany this transmission contains protected health information under state and federal law and is legally privileged. This information is intended only for the use of the individual or entity named above and may be used only for carrying out treatment, payment or other healthcare operations. The recipient or person responsible for delivering this information is prohibited by law from disclosing this information without proper authorization to any other party, unless required to do so by law or regulation. If you are not the intended recipient, you are hereby notified that any review, dissemination, distribution, or copying of this message is strictly prohibited. If you have received this communication in error, please destroy the  materials and contact us immediately by calling the number listed above. No response indicates that the information was received by the appropriate authorized party

## 2022-11-01 NOTE — ANESTHESIA PREPROCEDURE EVALUATION
Anesthesia Pre-Procedure Evaluation    Patient: Susan Puckett   MRN: [id number] : 1970        Procedure : Procedure(s):  TRANSPLANT, LIVER, RECIPIENT,  DONOR          Past Medical History:   Diagnosis Date     Anemia      Anxiety      Cold sore      Depressive disorder      Diabetes (H)     Type 2 DM/No Insulin      Encephalopathy      Esophageal varices without bleeding (H)     grade I     History of blood transfusion     10/2019     History of seizure     diabetic seizure     Insomnia      Liver cirrhosis secondary to CUETO (H) 2020     Migraine      Pleural effusion      Thrombocytopenia (H)      Thyroid disease       Past Surgical History:   Procedure Laterality Date     APPENDECTOMY      1989     COLONOSCOPY      2020 at [facility]      ENT SURGERY      tempanoplasty     GI SURGERY      EGD 2020 at [facility]      GYN SURGERY      laparoscopic ablation     IR TRANSVEN INTRAHEPATIC PORTOSYST SHUNT  2021     MAMMOPLASTY REDUCTION BILATERAL       SC STAB PHELBECTOMY VARICOSE VEINS, LESS THAN 10 INCISIONS, ONE EXTREMITY       RNY gastric bypass  2006    retoocolic, retrogastric, [facility]     TONSILLECTOMY & ADENOIDECTOMY Bilateral      WISDOM TEETH EXTRACTION        Allergies   Allergen Reactions     Methylprednisolone Hives     Droperidol Anxiety     Nsaids Other (See Comments)     GI bleed.     Prednisone Anxiety, Hives and Rash      Social History     Tobacco Use     Smoking status: Never     Smokeless tobacco: Never   Substance Use Topics     Alcohol use: Not Currently     Comment: Last drink was in 2017      Wt Readings from Last 1 Encounters:   09/15/22 78.2 kg (172 lb 8 oz)        Anesthesia Evaluation   Pt has had prior anesthetic.     No history of anesthetic complications       ROS/MED HX  ENT/Pulmonary: Comment: R pleural effusion 2/2 CUETO cirrhosis with frequent thoracentesis    [place] 2/2 R pleural effusions   (-) tobacco use, COPD and sleep  apnea   Neurologic:     (+) seizures (summer 2021 2/2 hypoglycemia),     Cardiovascular: Comment: TTE 9/22/2021: LVEF 60%, concentric LVH; RV normal  Stress 9/2020: normal    (+) -----Previous cardiac testing   Echo: Date: Results:    Stress Test: Date: 10/20/2021 Results:  Dobutamine stress echocardiogram with no inducible ischemia.     Target heart rate achieved. Normal blood pressure response to dobutamine  No angina symptoms during stress test.  No ECG evidence of ischemia at rest or with dobutamine.  Normal segmental and global left ventricular systolic function at baseline,  LVEF 55-60%.  With low-dose dobutamine, LVEF augmented and LV cavity size decreased  appropriately.  With peak dobutamine, LVEF increased further to >70% and LV cavity size  decreased appropriately.  No stress induced regional wall motion abnormalities.     Mildly dilated ascending aorta at 4 cm and no significant valvular dysfunction  noted on screening 2D and Doppler examination.  ECG Reviewed: Date: Results:    Cath: Date: Results:      METS/Exercise Tolerance: 3 - Able to walk 1-2 blocks without stopping    Hematologic: Comments: Thrombocytopenia      Musculoskeletal:       GI/Hepatic:  - neg GI/hepatic ROS   (+) GERD, Asymptomatic on medication, liver disease (CUETO cirrhosis c/b ascites, HE, hydrothorax, EV),     Renal/Genitourinary:       Endo:     (+) type II DM, Using insulin, thyroid problem, hypothyroidism,     Psychiatric/Substance Use:       Infectious Disease: Comment: HSV on acyclovir      Malignancy:       Other:            Physical Exam    Airway        Mallampati: II   TM distance: > 3 FB   Neck ROM: full   Mouth opening: > 3 cm    Respiratory Devices and Support         Dental         B=Bridge, C=Chipped, L=Loose, M=Missing    Cardiovascular   cardiovascular exam normal          Pulmonary   pulmonary exam normal                OUTSIDE LABS:  CBC:   Lab Results   Component Value Date    WBC 3.1 (L) 09/15/2022    WBC 4.0  07/14/2022    HGB 9.2 (L) 09/15/2022    HGB 9.2 (L) 07/14/2022    HCT 28.5 (L) 09/15/2022    HCT 28.1 (L) 07/14/2022     (L) 09/15/2022    PLT 85 (L) 07/14/2022     BMP:   Lab Results   Component Value Date     09/15/2022     07/14/2022    POTASSIUM 4.2 09/15/2022    POTASSIUM 3.9 07/14/2022    CHLORIDE 104 09/15/2022    CHLORIDE 107 09/15/2022    CO2 23 09/15/2022    CO2 23 07/14/2022    BUN 13.3 09/15/2022    BUN 13.2 07/14/2022    CR 1.02 (H) 09/15/2022    CR 0.90 07/14/2022     (H) 09/15/2022     (H) 07/14/2022     COAGS:   Lab Results   Component Value Date    PTT 38 07/09/2022    INR 1.8 (H) 09/15/2022     POC:   Lab Results   Component Value Date     (H) 02/25/2021    HCG Negative 01/27/2022    HCGS Negative 12/13/2021     HEPATIC:   Lab Results   Component Value Date    ALBUMIN 2.6 (L) 09/15/2022    PROTTOTAL 5.7 (L) 09/15/2022    ALT <5 (L) 09/15/2022    AST 35 09/15/2022    ALKPHOS 91 09/15/2022    BILITOTAL 1.4 (H) 09/15/2022    JOVANNY 56 (H) 07/09/2022     OTHER:   Lab Results   Component Value Date    PH 7.44 (H) 11/06/2021    LACT 1.5 02/15/2022    A1C 7.3 (H) 07/09/2022    MAURI 8.7 09/15/2022    PHOS 2.5 01/10/2022    MAG 1.8 02/16/2022    LIPASE 6 (L) 07/09/2022    TSH 0.06 (L) 02/15/2022    T4 2.26 (H) 02/15/2022    CRP 3.7 11/22/2020       Anesthesia Plan    ASA Status:  4   NPO Status:  NPO Appropriate    Anesthesia Type: General.     - Airway: ETT   Induction: Intravenous.   Maintenance: Balanced.   Techniques and Equipment:     - Airway: Video-Laryngoscope     - Lines/Monitors: 2nd IV, Arterial Line, Central Line, CVP, BIS, NIRS     - Blood: Blood in Room, PRBC, Cell Saver, FFP, PLT     - Drips/Meds: Fentanyl, Norepi, Vasopressin, Epinephrine     Consents    Anesthesia Plan(s) and associated risks, benefits, and realistic alternatives discussed. Questions answered and patient/representative(s) expressed understanding.    - Discussed:     - Discussed with:   Patient      - Extended Intubation/Ventilatory Support Discussed: Yes.      - Patient is DNR/DNI Status: No    Use of blood products discussed: Yes.     - Discussed with: Patient.     - Consented: consented to blood products            Reason for refusal: other.     Postoperative Care    Pain management: IV analgesics, Multi-modal analgesia.   PONV prophylaxis: Ondansetron (or other 5HT-3), Dexamethasone or Solumedrol     Comments:           H&P reviewed: Unable to attach H&P to encounter due to EHR limitations. H&P Update: appropriate H&P reviewed, patient examined. No interval changes since H&P (within 30 days).         Arsenio Kamara MD

## 2022-11-01 NOTE — H&P
[facility]    History and Physical  Solid Organ Transplant     Date of Admission: 2022      Assessment & Plan   Susan Puckett is a 52 year old female with a past medical history significant for end stage liver disease secondary to nonalcoholic fatty liver disease s/p TIPS. Liver disease complicated by portal hypertension, ascites, encephalopathy, esophageal varices, recurrent pleural effusion/hepatohydrothorax s/p frequent thoracentesis, coagulapathy, and thrombocytopenia.  Other past medical history includes obesity s/p RNY gastric bypass surgery (), GERD, line associated upper extremity DVT (2022), anemia, diabetes, history of diabetic seizure, hypothyroidism, HSV, depressive disorder, anxiety, and migraine.  Patient was notified as an acceptable  donor organ became available and presented for further pre-operative work-up.  Patient was informed of the risks and benefits regarding  donor liver transplant transplantation, and has elected to proceed.    -[place] to outpatient for pre-op work-up  -Pre-op labs, including BMP, CBC, coag panel, viral serologies  -EKG, CXR  -COVID PCR  -Surgeon to sign consent  -NPO after midnight. OR time 0700 on   -Cardiac: TTE 2021: LVEF 60%, concentric LVH; RV normal. Stress 2020: normal     Entered: Carmen Abraham PA-C, ALLA 2022, 3:25 PM  Carmen Abraham PA-C, ALLA    Code Status   Full Code  Disposition Plan   Expected discharge in 1-2 weeks to prior living arrangement once recovered from liver transplant - pain control, tolerating PO intake without n/v, normalisation of liver function labs, ambulating appropriately.      Primary Care Physician   Harleen Billy    Chief Complaint   Liver transplant candidate     History is obtained from the patient    History of Present Illness   Susan Puckett is a 52 year old female with a past medical history significant for end stage liver disease  secondary to nonalcoholic fatty liver disease s/p TIPS. Liver disease complicated by portal hypertension, ascites, encephalopathy, esophageal varices, recurrent pleural effusion/hepatohydrothorax s/p frequent thoracentesis, coagulapathy, and thrombocytopenia.  Other past medical history includes obesity s/p RNY gastric bypass surgery (2006), GERD, line associated upper extremity DVT (2/2022), anemia, diabetes, history of diabetic seizure, hypothyroidism, HSV, depressive disorder, anxiety, and migraine.     At the time of admission, the patient was feeling well, but tested positive for covid upon admission. Cycle threshold of 37.9. Discussed with Transplant Fellow Dr Miranda and Transplant ID. Surgery to go ahead.     ROS:  Resp: no cough, no sob, no cp  CV: no cp, no oedema  GI: diarrhea - normal for her, no abdo pain, no n/v, good appetite  : no dysuria, no hematuria    MELD-Na score: 17 at 9/15/2022  4:04 PM  MELD score: 17 at 9/15/2022  4:04 PM  Calculated from:  Serum Creatinine: 1.12 mg/dL at 9/15/2022  4:04 PM  Serum Sodium: 139 mmol/L (Using max of 137 mmol/L) at 9/15/2022  4:04 PM  Total Bilirubin: 2.2 mg/dL at 9/15/2022  4:04 PM  INR(ratio): 1.8 at 9/15/2022  4:04 PM  Age: 52 years      Past Medical History    I have reviewed this patient's medical history and updated it with pertinent information if needed.   Past Medical History:   Diagnosis Date     Anemia      Anxiety      Cold sore      Depressive disorder      Diabetes (H)     Type 2 DM/No Insulin      Encephalopathy      Esophageal varices without bleeding (H)     grade I     History of blood transfusion     10/2019     History of seizure     diabetic seizure     Insomnia      Liver cirrhosis secondary to CUETO (H) 05/28/2020     Migraine      Pleural effusion      Thrombocytopenia (H)      Thyroid disease        Past Surgical History   I have reviewed this patient's surgical history and updated it with pertinent information if needed.  Past  Surgical History:   Procedure Laterality Date     APPENDECTOMY      1989     COLONOSCOPY      1/2020 at [facility]      ENT SURGERY  1998    tempanoplasty     GI SURGERY      EGD August 2020 at [facility]      GYN SURGERY  2010    laparoscopic ablation     IR TRANSVEN INTRAHEPATIC PORTOSYST SHUNT  11/5/2021     MAMMOPLASTY REDUCTION BILATERAL  2004     SC STAB PHELBECTOMY VARICOSE VEINS, LESS THAN 10 INCISIONS, ONE EXTREMITY  2020     RNY gastric bypass  01/2006    retoocolic, retrogastric, [facility]     TONSILLECTOMY & ADENOIDECTOMY Bilateral 1978     WISDOM TEETH EXTRACTION         Prior to Admission Medications   Medications reviewed with patient and  Roly. Med Rec reconciled.      Allergies   Allergies   Allergen Reactions     Methylprednisolone Hives     Droperidol Anxiety     Nsaids Other (See Comments)     GI bleed.     Prednisone Anxiety, Hives and Rash       Social History   I have reviewed this patient's social history and updated it with pertinent information if needed. Susan Puckett  reports that she has never smoked. She has never used smokeless tobacco. She reports that she does not currently use alcohol. She reports that she does not use drugs.    Family History   I have reviewed this patient's family history and updated it with pertinent information if needed.   Family History   Problem Relation Age of Onset     Anesthesia Reaction Nephew         PONV     Bleeding Disorder No family hx of      Clotting Disorder No family hx of        Review of Systems   The 10 point Review of Systems is negative other than noted in the HPI.    Physical Exam                      Vital Signs with Ranges  /61 (BP Location: Left arm)   Pulse 63   Temp 97.6  F (36.4  C) (Oral)   Resp 14   SpO2 100%       Constitutional: Alert, NAD  Respiratory: NLB on RA, clear vesicular breath sounds on auscultation  Cardiovascular: S1, S2, no added murmur, rrr, wwp  Abdo: soft, mild diffuse tenderness to palpation  due to ascites    Data   Results for orders placed or performed during the hospital encounter of 11/01/22 (from the past 24 hour(s))   EKG 12-lead, tracing only   Result Value Ref Range    Systolic Blood Pressure  mmHg    Diastolic Blood Pressure  mmHg    Ventricular Rate 65 BPM    Atrial Rate 65 BPM    NY Interval 144 ms    QRS Duration 84 ms     ms    QTc 509 ms    P Axis 36 degrees    R AXIS -14 degrees    T Axis 10 degrees    Interpretation ECG       Sinus rhythm  Minimal voltage criteria for LVH, may be normal variant  Cannot rule out Anterior infarct , age undetermined  Prolonged QT  Abnormal ECG  When compared with ECG of 10-JUL-2022 00:20,  No significant change was found     XR Chest 2 Views    Narrative    EXAM: XR CHEST 2 VIEWS  11/1/2022 7:02 PM     HISTORY:  Pre transplant screen       COMPARISON:  Chest radiograph 7/9/2022    FINDINGS:   PA and lateral views of the chest. Midline trachea. Cardiomediastinal  silhouette is within normal limits. No pneumothorax or pleural  effusion. No focal pulmonary opacity. Visualized upper abdomen is  unremarkable. No acute osseous abnormality.      Impression    IMPRESSION:   No focal airspace disease.    I have personally reviewed the examination and initial interpretation  and I agree with the findings.    KONRAD MORLEY MD         SYSTEM ID:  [id number]       H&P completed by:  Carmen Abraham PA-C and Mirta Yao, General Surgery PGY1  11/1/2022 10:50 PM

## 2022-11-02 ENCOUNTER — APPOINTMENT (OUTPATIENT)
Dept: GENERAL RADIOLOGY | Facility: CLINIC | Age: 52
DRG: 005 | End: 2022-11-02
Attending: SURGERY
Payer: COMMERCIAL

## 2022-11-02 ENCOUNTER — ANESTHESIA (OUTPATIENT)
Dept: SURGERY | Facility: CLINIC | Age: 52
DRG: 005 | End: 2022-11-02
Payer: COMMERCIAL

## 2022-11-02 ENCOUNTER — APPOINTMENT (OUTPATIENT)
Dept: GENERAL RADIOLOGY | Facility: CLINIC | Age: 52
DRG: 005 | End: 2022-11-02
Attending: TRANSPLANT SURGERY
Payer: COMMERCIAL

## 2022-11-02 ENCOUNTER — APPOINTMENT (OUTPATIENT)
Dept: ULTRASOUND IMAGING | Facility: CLINIC | Age: 52
DRG: 005 | End: 2022-11-02
Attending: SURGERY
Payer: COMMERCIAL

## 2022-11-02 LAB
ALBUMIN SERPL BCG-MCNC: 2.7 G/DL (ref 3.5–5.2)
ALBUMIN SERPL BCG-MCNC: 3.4 G/DL (ref 3.5–5.2)
ALP SERPL-CCNC: 50 U/L (ref 35–104)
ALP SERPL-CCNC: 86 U/L (ref 35–104)
ALT SERPL W P-5'-P-CCNC: 437 U/L (ref 10–35)
ALT SERPL W P-5'-P-CCNC: 7 U/L (ref 10–35)
AMYLASE SERPL-CCNC: 45 U/L (ref 28–100)
ANION GAP SERPL CALCULATED.3IONS-SCNC: 14 MMOL/L (ref 7–15)
ANION GAP SERPL CALCULATED.3IONS-SCNC: 15 MMOL/L (ref 7–15)
ANION GAP SERPL CALCULATED.3IONS-SCNC: 17 MMOL/L (ref 7–15)
APTT PPP: 40 SECONDS (ref 22–38)
APTT PPP: 46 SECONDS (ref 22–38)
APTT PPP: 65 SECONDS (ref 22–38)
APTT PPP: 83 SECONDS (ref 22–38)
AST SERPL W P-5'-P-CCNC: 46 U/L (ref 10–35)
AST SERPL W P-5'-P-CCNC: 856 U/L (ref 10–35)
ATRIAL RATE - MUSE: 65 BPM
BASE EXCESS BLDA CALC-SCNC: -4.5 MMOL/L (ref -9–1.8)
BASE EXCESS BLDA CALC-SCNC: 1.5 MMOL/L (ref -9–1.8)
BASOPHILS # BLD AUTO: 0 10E3/UL (ref 0–0.2)
BASOPHILS NFR BLD AUTO: 0 %
BASOPHILS NFR BLD AUTO: 0 %
BASOPHILS NFR BLD AUTO: 1 %
BILIRUB SERPL-MCNC: 1.5 MG/DL
BILIRUB SERPL-MCNC: 2.2 MG/DL
BLD PROD TYP BPU: NORMAL
BLOOD COMPONENT TYPE: NORMAL
BUN SERPL-MCNC: 12.5 MG/DL (ref 6–20)
BUN SERPL-MCNC: 13.1 MG/DL (ref 6–20)
BUN SERPL-MCNC: 9.9 MG/DL (ref 6–20)
CALCIUM SERPL-MCNC: 8.6 MG/DL (ref 8.6–10)
CALCIUM SERPL-MCNC: 9.2 MG/DL (ref 8.6–10)
CALCIUM SERPL-MCNC: 9.3 MG/DL (ref 8.6–10)
CF REDUC 60M P MA LENFR BLD TEG: 4.5 % (ref 0–15)
CFT BLD TEG: 1.3 MINUTE (ref 1–3)
CHLORIDE SERPL-SCNC: 105 MMOL/L (ref 98–107)
CHLORIDE SERPL-SCNC: 105 MMOL/L (ref 98–107)
CHLORIDE SERPL-SCNC: 117 MMOL/L (ref 98–107)
CI (COAGULATION INDEX)(Z) NON NATIVE: 0.1 (ref -3–3)
CLOT ANGLE BLD TEG: 71.1 DEGREES (ref 53–72)
CLOT INIT BLD TEG: 5.7 MINUTE (ref 5–10)
CLOT LYSIS 30M P MA LENFR BLD TEG: 1.1 % (ref 0–8)
CLOT STRENGTH BLD TEG: 6.1 KD/SC (ref 4.5–11)
CMV IGG SERPL IA-ACNC: >10 U/ML
CMV IGG SERPL IA-ACNC: ABNORMAL
CMV IGM SERPL IA-ACNC: <8 AU/ML
CMV IGM SERPL IA-ACNC: NEGATIVE
CODING SYSTEM: NORMAL
CREAT SERPL-MCNC: 0.71 MG/DL (ref 0.51–0.95)
CREAT SERPL-MCNC: 0.77 MG/DL (ref 0.51–0.95)
CREAT SERPL-MCNC: 0.93 MG/DL (ref 0.51–0.95)
DEPRECATED HCO3 PLAS-SCNC: 17 MMOL/L (ref 22–29)
DEPRECATED HCO3 PLAS-SCNC: 20 MMOL/L (ref 22–29)
DEPRECATED HCO3 PLAS-SCNC: 23 MMOL/L (ref 22–29)
DIASTOLIC BLOOD PRESSURE - MUSE: NORMAL MMHG
EBV VCA IGG SER IA-ACNC: >750 U/ML
EBV VCA IGG SER IA-ACNC: POSITIVE
EBV VCA IGM SER IA-ACNC: 11.3 U/ML
EBV VCA IGM SER IA-ACNC: NORMAL
EOSINOPHIL # BLD AUTO: 0 10E3/UL (ref 0–0.7)
EOSINOPHIL # BLD AUTO: 0 10E3/UL (ref 0–0.7)
EOSINOPHIL # BLD AUTO: 0.1 10E3/UL (ref 0–0.7)
EOSINOPHIL NFR BLD AUTO: 0 %
EOSINOPHIL NFR BLD AUTO: 0 %
EOSINOPHIL NFR BLD AUTO: 5 %
ERYTHROCYTE [DISTWIDTH] IN BLOOD BY AUTOMATED COUNT: 14.9 % (ref 10–15)
ERYTHROCYTE [DISTWIDTH] IN BLOOD BY AUTOMATED COUNT: 17 % (ref 10–15)
ERYTHROCYTE [DISTWIDTH] IN BLOOD BY AUTOMATED COUNT: 17.2 % (ref 10–15)
FIBRINOGEN PPP-MCNC: 127 MG/DL (ref 170–490)
FIBRINOGEN PPP-MCNC: 129 MG/DL (ref 170–490)
FIBRINOGEN PPP-MCNC: 150 MG/DL (ref 170–490)
FIBRINOGEN PPP-MCNC: 173 MG/DL (ref 170–490)
FIBRINOGEN PPP-MCNC: 174 MG/DL (ref 170–490)
GFR SERPL CREATININE-BSD FRML MDRD: 75 ML/MIN/1.73M2
GFR SERPL CREATININE-BSD FRML MDRD: >90 ML/MIN/1.73M2
GFR SERPL CREATININE-BSD FRML MDRD: >90 ML/MIN/1.73M2
GLUCOSE BLDC GLUCOMTR-MCNC: 212 MG/DL (ref 70–99)
GLUCOSE BLDC GLUCOMTR-MCNC: 217 MG/DL (ref 70–99)
GLUCOSE BLDC GLUCOMTR-MCNC: 220 MG/DL (ref 70–99)
GLUCOSE BLDC GLUCOMTR-MCNC: 226 MG/DL (ref 70–99)
GLUCOSE BLDC GLUCOMTR-MCNC: 238 MG/DL (ref 70–99)
GLUCOSE BLDC GLUCOMTR-MCNC: 253 MG/DL (ref 70–99)
GLUCOSE BLDC GLUCOMTR-MCNC: 268 MG/DL (ref 70–99)
GLUCOSE BLDC GLUCOMTR-MCNC: 275 MG/DL (ref 70–99)
GLUCOSE SERPL-MCNC: 130 MG/DL (ref 70–99)
GLUCOSE SERPL-MCNC: 237 MG/DL (ref 70–99)
GLUCOSE SERPL-MCNC: 252 MG/DL (ref 70–99)
HBA1C MFR BLD: 6.2 %
HBV CORE AB SERPL QL IA: NONREACTIVE
HBV SURFACE AB SERPL IA-ACNC: 1.4 M[IU]/ML
HBV SURFACE AB SERPL IA-ACNC: NONREACTIVE M[IU]/ML
HBV SURFACE AG SERPL QL IA: NONREACTIVE
HCO3 BLD-SCNC: 21 MMOL/L (ref 21–28)
HCO3 BLD-SCNC: 26 MMOL/L (ref 21–28)
HCT VFR BLD AUTO: 26 % (ref 35–47)
HCT VFR BLD AUTO: 27.3 % (ref 35–47)
HCT VFR BLD AUTO: 28.1 % (ref 35–47)
HCV AB SERPL QL IA: NONREACTIVE
HGB BLD-MCNC: 8.5 G/DL (ref 11.7–15.7)
HGB BLD-MCNC: 9.3 G/DL (ref 11.7–15.7)
HGB BLD-MCNC: 9.4 G/DL (ref 11.7–15.7)
HGB BLD-MCNC: 9.5 G/DL (ref 11.7–15.7)
HGB BLD-MCNC: 9.8 G/DL (ref 11.7–15.7)
HIV 1+2 AB+HIV1 P24 AG SERPL QL IA: NONREACTIVE
IMM GRANULOCYTES # BLD: 0 10E3/UL
IMM GRANULOCYTES NFR BLD: 0 %
IMM GRANULOCYTES NFR BLD: 1 %
IMM GRANULOCYTES NFR BLD: 1 %
INR PPP: 1.71 (ref 0.85–1.15)
INR PPP: 1.88 (ref 0.85–1.15)
INR PPP: 1.94 (ref 0.85–1.15)
INR PPP: 2.37 (ref 0.85–1.15)
INR PPP: 2.75 (ref 0.85–1.15)
INR PPP: 2.79 (ref 0.85–1.15)
INTERPRETATION ECG - MUSE: NORMAL
ISSUE DATE AND TIME: NORMAL
LACTATE SERPL-SCNC: 1.2 MMOL/L (ref 0.7–2)
LACTATE SERPL-SCNC: 3.3 MMOL/L (ref 0.7–2)
LACTATE SERPL-SCNC: 5.2 MMOL/L (ref 0.7–2)
LACTATE SERPL-SCNC: 5.8 MMOL/L (ref 0.7–2)
LYMPHOCYTES # BLD AUTO: 0.2 10E3/UL (ref 0.8–5.3)
LYMPHOCYTES # BLD AUTO: 0.2 10E3/UL (ref 0.8–5.3)
LYMPHOCYTES # BLD AUTO: 0.8 10E3/UL (ref 0.8–5.3)
LYMPHOCYTES NFR BLD AUTO: 31 %
LYMPHOCYTES NFR BLD AUTO: 4 %
LYMPHOCYTES NFR BLD AUTO: 5 %
MAGNESIUM SERPL-MCNC: 1.7 MG/DL (ref 1.7–2.3)
MCF BLD TEG: 54.8 MM (ref 50–70)
MCH RBC QN AUTO: 31.1 PG (ref 26.5–33)
MCH RBC QN AUTO: 31.3 PG (ref 26.5–33)
MCH RBC QN AUTO: 34 PG (ref 26.5–33)
MCHC RBC AUTO-ENTMCNC: 32.7 G/DL (ref 31.5–36.5)
MCHC RBC AUTO-ENTMCNC: 33.8 G/DL (ref 31.5–36.5)
MCHC RBC AUTO-ENTMCNC: 34.1 G/DL (ref 31.5–36.5)
MCV RBC AUTO: 104 FL (ref 78–100)
MCV RBC AUTO: 92 FL (ref 78–100)
MCV RBC AUTO: 92 FL (ref 78–100)
MONOCYTES # BLD AUTO: 0.1 10E3/UL (ref 0–1.3)
MONOCYTES # BLD AUTO: 0.1 10E3/UL (ref 0–1.3)
MONOCYTES # BLD AUTO: 0.4 10E3/UL (ref 0–1.3)
MONOCYTES NFR BLD AUTO: 14 %
MONOCYTES NFR BLD AUTO: 3 %
MONOCYTES NFR BLD AUTO: 4 %
MRSA DNA SPEC QL NAA+PROBE: NEGATIVE
NEUTROPHILS # BLD AUTO: 1.3 10E3/UL (ref 1.6–8.3)
NEUTROPHILS # BLD AUTO: 3.7 10E3/UL (ref 1.6–8.3)
NEUTROPHILS # BLD AUTO: 3.7 10E3/UL (ref 1.6–8.3)
NEUTROPHILS NFR BLD AUTO: 49 %
NEUTROPHILS NFR BLD AUTO: 91 %
NEUTROPHILS NFR BLD AUTO: 91 %
NRBC # BLD AUTO: 0 10E3/UL
NRBC BLD AUTO-RTO: 0 /100
O2/TOTAL GAS SETTING VFR VENT: 0 %
O2/TOTAL GAS SETTING VFR VENT: 6 %
P AXIS - MUSE: 36 DEGREES
PCO2 BLD: 39 MM HG (ref 35–45)
PCO2 BLD: 42 MM HG (ref 35–45)
PH BLD: 7.32 [PH] (ref 7.35–7.45)
PH BLD: 7.43 [PH] (ref 7.35–7.45)
PHOSPHATE SERPL-MCNC: 3.3 MG/DL (ref 2.5–4.5)
PLATELET # BLD AUTO: 112 10E3/UL (ref 150–450)
PLATELET # BLD AUTO: 114 10E3/UL (ref 150–450)
PLATELET # BLD AUTO: 87 10E3/UL (ref 150–450)
PLATELET # BLD AUTO: 96 10E3/UL (ref 150–450)
PLATELET # BLD AUTO: 97 10E3/UL (ref 150–450)
PO2 BLD: 101 MM HG (ref 80–105)
PO2 BLD: 108 MM HG (ref 80–105)
POTASSIUM SERPL-SCNC: 3 MMOL/L (ref 3.4–5.3)
POTASSIUM SERPL-SCNC: 3.1 MMOL/L (ref 3.4–5.3)
POTASSIUM SERPL-SCNC: 4.1 MMOL/L (ref 3.4–5.3)
PR INTERVAL - MUSE: 144 MS
PROT SERPL-MCNC: 4.9 G/DL (ref 6.4–8.3)
PROT SERPL-MCNC: 5.7 G/DL (ref 6.4–8.3)
QRS DURATION - MUSE: 84 MS
QT - MUSE: 490 MS
QTC - MUSE: 509 MS
R AXIS - MUSE: -14 DEGREES
RBC # BLD AUTO: 2.5 10E6/UL (ref 3.8–5.2)
RBC # BLD AUTO: 2.97 10E6/UL (ref 3.8–5.2)
RBC # BLD AUTO: 3.05 10E6/UL (ref 3.8–5.2)
SA TARGET DNA: NEGATIVE
SODIUM SERPL-SCNC: 142 MMOL/L (ref 136–145)
SODIUM SERPL-SCNC: 142 MMOL/L (ref 136–145)
SODIUM SERPL-SCNC: 149 MMOL/L (ref 136–145)
SYSTOLIC BLOOD PRESSURE - MUSE: NORMAL MMHG
T AXIS - MUSE: 10 DEGREES
UNIT ABO/RH: NORMAL
UNIT NUMBER: NORMAL
UNIT STATUS: NORMAL
UNIT TYPE ISBT: 5100
UNIT TYPE ISBT: 600
UNIT TYPE ISBT: 6200
VENTRICULAR RATE- MUSE: 65 BPM
WBC # BLD AUTO: 2.7 10E3/UL (ref 4–11)
WBC # BLD AUTO: 4 10E3/UL (ref 4–11)
WBC # BLD AUTO: 4 10E3/UL (ref 4–11)

## 2022-11-02 PROCEDURE — 999N000065 XR CHEST PORT 1 VIEW

## 2022-11-02 PROCEDURE — 999N000157 HC STATISTIC RCP TIME EA 10 MIN

## 2022-11-02 PROCEDURE — 250N000012 HC RX MED GY IP 250 OP 636 PS 637: Performed by: TRANSPLANT SURGERY

## 2022-11-02 PROCEDURE — 83605 ASSAY OF LACTIC ACID: CPT | Performed by: SURGERY

## 2022-11-02 PROCEDURE — 85384 FIBRINOGEN ACTIVITY: CPT

## 2022-11-02 PROCEDURE — 272N000001 HC OR GENERAL SUPPLY STERILE: Performed by: TRANSPLANT SURGERY

## 2022-11-02 PROCEDURE — 85049 AUTOMATED PLATELET COUNT: CPT

## 2022-11-02 PROCEDURE — 85610 PROTHROMBIN TIME: CPT | Performed by: SURGERY

## 2022-11-02 PROCEDURE — 99291 CRITICAL CARE FIRST HOUR: CPT | Mod: 24 | Performed by: ANESTHESIOLOGY

## 2022-11-02 PROCEDURE — 258N000001 HC RX 258: Performed by: SURGERY

## 2022-11-02 PROCEDURE — 250N000009 HC RX 250: Performed by: PHYSICIAN ASSISTANT

## 2022-11-02 PROCEDURE — 0FY00Z0 TRANSPLANTATION OF LIVER, ALLOGENEIC, OPEN APPROACH: ICD-10-PCS | Performed by: TRANSPLANT SURGERY

## 2022-11-02 PROCEDURE — 85049 AUTOMATED PLATELET COUNT: CPT | Performed by: SURGERY

## 2022-11-02 PROCEDURE — 250N000009 HC RX 250

## 2022-11-02 PROCEDURE — 85396 CLOTTING ASSAY WHOLE BLOOD: CPT

## 2022-11-02 PROCEDURE — 84132 ASSAY OF SERUM POTASSIUM: CPT

## 2022-11-02 PROCEDURE — 250N000013 HC RX MED GY IP 250 OP 250 PS 637: Performed by: SURGERY

## 2022-11-02 PROCEDURE — 82330 ASSAY OF CALCIUM: CPT

## 2022-11-02 PROCEDURE — 82248 BILIRUBIN DIRECT: CPT | Performed by: SURGERY

## 2022-11-02 PROCEDURE — 258N000003 HC RX IP 258 OP 636

## 2022-11-02 PROCEDURE — 94640 AIRWAY INHALATION TREATMENT: CPT

## 2022-11-02 PROCEDURE — 93975 VASCULAR STUDY: CPT | Mod: 26 | Performed by: RADIOLOGY

## 2022-11-02 PROCEDURE — 250N000009 HC RX 250: Performed by: TRANSPLANT SURGERY

## 2022-11-02 PROCEDURE — 85049 AUTOMATED PLATELET COUNT: CPT | Performed by: TRANSPLANT SURGERY

## 2022-11-02 PROCEDURE — 82803 BLOOD GASES ANY COMBINATION: CPT

## 2022-11-02 PROCEDURE — 999N000065 XR ABDOMEN PORT 1 VIEW

## 2022-11-02 PROCEDURE — 84132 ASSAY OF SERUM POTASSIUM: CPT | Performed by: SURGERY

## 2022-11-02 PROCEDURE — 88309 TISSUE EXAM BY PATHOLOGIST: CPT | Mod: 26 | Performed by: PATHOLOGY

## 2022-11-02 PROCEDURE — 258N000003 HC RX IP 258 OP 636: Performed by: PHYSICIAN ASSISTANT

## 2022-11-02 PROCEDURE — 250N000025 HC SEVOFLURANE, PER MIN: Performed by: TRANSPLANT SURGERY

## 2022-11-02 PROCEDURE — 250N000013 HC RX MED GY IP 250 OP 250 PS 637: Performed by: PHYSICIAN ASSISTANT

## 2022-11-02 PROCEDURE — 85610 PROTHROMBIN TIME: CPT | Performed by: TRANSPLANT SURGERY

## 2022-11-02 PROCEDURE — P9012 CRYOPRECIPITATE EACH UNIT: HCPCS

## 2022-11-02 PROCEDURE — 83605 ASSAY OF LACTIC ACID: CPT

## 2022-11-02 PROCEDURE — 250N000011 HC RX IP 250 OP 636

## 2022-11-02 PROCEDURE — 74018 RADEX ABDOMEN 1 VIEW: CPT | Mod: 26 | Performed by: RADIOLOGY

## 2022-11-02 PROCEDURE — 88313 SPECIAL STAINS GROUP 2: CPT | Mod: 26 | Performed by: PATHOLOGY

## 2022-11-02 PROCEDURE — 85384 FIBRINOGEN ACTIVITY: CPT | Performed by: SURGERY

## 2022-11-02 PROCEDURE — 250N000011 HC RX IP 250 OP 636: Performed by: SURGERY

## 2022-11-02 PROCEDURE — 88313 SPECIAL STAINS GROUP 2: CPT | Mod: TC | Performed by: TRANSPLANT SURGERY

## 2022-11-02 PROCEDURE — 812N000006 HC ACQUISITION LIVER CADAVER DONOR

## 2022-11-02 PROCEDURE — 47135 TRANSPLANTATION OF LIVER: CPT | Performed by: TRANSPLANT SURGERY

## 2022-11-02 PROCEDURE — 83605 ASSAY OF LACTIC ACID: CPT | Performed by: STUDENT IN AN ORGANIZED HEALTH CARE EDUCATION/TRAINING PROGRAM

## 2022-11-02 PROCEDURE — 82803 BLOOD GASES ANY COMBINATION: CPT | Performed by: SURGERY

## 2022-11-02 PROCEDURE — 250N000011 HC RX IP 250 OP 636: Performed by: PHYSICIAN ASSISTANT

## 2022-11-02 PROCEDURE — 85730 THROMBOPLASTIN TIME PARTIAL: CPT | Performed by: SURGERY

## 2022-11-02 PROCEDURE — 84132 ASSAY OF SERUM POTASSIUM: CPT | Performed by: TRANSPLANT SURGERY

## 2022-11-02 PROCEDURE — P9016 RBC LEUKOCYTES REDUCED: HCPCS

## 2022-11-02 PROCEDURE — 85396 CLOTTING ASSAY WHOLE BLOOD: CPT | Performed by: TRANSPLANT SURGERY

## 2022-11-02 PROCEDURE — 88304 TISSUE EXAM BY PATHOLOGIST: CPT | Mod: 26 | Performed by: PATHOLOGY

## 2022-11-02 PROCEDURE — 85018 HEMOGLOBIN: CPT | Performed by: TRANSPLANT SURGERY

## 2022-11-02 PROCEDURE — 83036 HEMOGLOBIN GLYCOSYLATED A1C: CPT | Performed by: PHYSICIAN ASSISTANT

## 2022-11-02 PROCEDURE — 999N000202 HC STATISTICAL VASC ACCESS NURSE TIME, 1-15 MINUTES

## 2022-11-02 PROCEDURE — 93975 VASCULAR STUDY: CPT

## 2022-11-02 PROCEDURE — 71045 X-RAY EXAM CHEST 1 VIEW: CPT | Mod: 26 | Performed by: RADIOLOGY

## 2022-11-02 PROCEDURE — 85610 PROTHROMBIN TIME: CPT

## 2022-11-02 PROCEDURE — 360N000078 HC SURGERY LEVEL 5, PER MIN: Performed by: TRANSPLANT SURGERY

## 2022-11-02 PROCEDURE — 370N000017 HC ANESTHESIA TECHNICAL FEE, PER MIN: Performed by: TRANSPLANT SURGERY

## 2022-11-02 PROCEDURE — 250N000011 HC RX IP 250 OP 636: Performed by: TRANSPLANT SURGERY

## 2022-11-02 PROCEDURE — 84450 TRANSFERASE (AST) (SGOT): CPT

## 2022-11-02 PROCEDURE — P9037 PLATE PHERES LEUKOREDU IRRAD: HCPCS

## 2022-11-02 PROCEDURE — 250N000013 HC RX MED GY IP 250 OP 250 PS 637

## 2022-11-02 PROCEDURE — 258N000003 HC RX IP 258 OP 636: Performed by: TRANSPLANT SURGERY

## 2022-11-02 PROCEDURE — P9059 PLASMA, FRZ BETWEEN 8-24HOUR: HCPCS

## 2022-11-02 PROCEDURE — 85730 THROMBOPLASTIN TIME PARTIAL: CPT | Performed by: TRANSPLANT SURGERY

## 2022-11-02 PROCEDURE — 250N000012 HC RX MED GY IP 250 OP 636 PS 637: Performed by: SURGERY

## 2022-11-02 PROCEDURE — 85384 FIBRINOGEN ACTIVITY: CPT | Performed by: TRANSPLANT SURGERY

## 2022-11-02 PROCEDURE — 87641 MR-STAPH DNA AMP PROBE: CPT | Performed by: SURGERY

## 2022-11-02 PROCEDURE — 82962 GLUCOSE BLOOD TEST: CPT

## 2022-11-02 RX ORDER — VALGANCICLOVIR HYDROCHLORIDE 50 MG/ML
450 POWDER, FOR SOLUTION ORAL DAILY
Status: DISCONTINUED | OUTPATIENT
Start: 2022-11-03 | End: 2022-11-03

## 2022-11-02 RX ORDER — FUROSEMIDE 10 MG/ML
INJECTION INTRAMUSCULAR; INTRAVENOUS PRN
Status: DISCONTINUED | OUTPATIENT
Start: 2022-11-02 | End: 2022-11-02

## 2022-11-02 RX ORDER — NALOXONE HYDROCHLORIDE 0.4 MG/ML
0.4 INJECTION, SOLUTION INTRAMUSCULAR; INTRAVENOUS; SUBCUTANEOUS
Status: DISCONTINUED | OUTPATIENT
Start: 2022-11-02 | End: 2022-11-10 | Stop reason: HOSPADM

## 2022-11-02 RX ORDER — POTASSIUM CHLORIDE 29.8 MG/ML
INJECTION INTRAVENOUS PRN
Status: DISCONTINUED | OUTPATIENT
Start: 2022-11-02 | End: 2022-11-02

## 2022-11-02 RX ORDER — FENTANYL CITRATE 50 UG/ML
INJECTION, SOLUTION INTRAMUSCULAR; INTRAVENOUS PRN
Status: DISCONTINUED | OUTPATIENT
Start: 2022-11-02 | End: 2022-11-02

## 2022-11-02 RX ORDER — OXYCODONE HYDROCHLORIDE 5 MG/1
5-10 TABLET ORAL EVERY 4 HOURS PRN
Status: DISCONTINUED | OUTPATIENT
Start: 2022-11-02 | End: 2022-11-03

## 2022-11-02 RX ORDER — PREDNISONE 10 MG/1
10 TABLET ORAL ONCE
Status: COMPLETED | OUTPATIENT
Start: 2022-11-07 | End: 2022-11-07

## 2022-11-02 RX ORDER — SULFAMETHOXAZOLE AND TRIMETHOPRIM 400; 80 MG/1; MG/1
1 TABLET ORAL DAILY
Status: DISCONTINUED | OUTPATIENT
Start: 2022-11-03 | End: 2022-11-03

## 2022-11-02 RX ORDER — DEXTROSE MONOHYDRATE 25 G/50ML
25-50 INJECTION, SOLUTION INTRAVENOUS
Status: DISCONTINUED | OUTPATIENT
Start: 2022-11-02 | End: 2022-11-10 | Stop reason: HOSPADM

## 2022-11-02 RX ORDER — PIPERACILLIN SODIUM, TAZOBACTAM SODIUM 3; .375 G/15ML; G/15ML
3.38 INJECTION, POWDER, LYOPHILIZED, FOR SOLUTION INTRAVENOUS EVERY 6 HOURS
Status: COMPLETED | OUTPATIENT
Start: 2022-11-02 | End: 2022-11-04

## 2022-11-02 RX ORDER — VERAPAMIL HYDROCHLORIDE 2.5 MG/ML
INJECTION, SOLUTION INTRAVENOUS PRN
Status: DISCONTINUED | OUTPATIENT
Start: 2022-11-02 | End: 2022-11-02 | Stop reason: HOSPADM

## 2022-11-02 RX ORDER — NALOXONE HYDROCHLORIDE 0.4 MG/ML
0.2 INJECTION, SOLUTION INTRAMUSCULAR; INTRAVENOUS; SUBCUTANEOUS
Status: DISCONTINUED | OUTPATIENT
Start: 2022-11-02 | End: 2022-11-10 | Stop reason: HOSPADM

## 2022-11-02 RX ORDER — ALBUTEROL SULFATE 0.83 MG/ML
2.5 SOLUTION RESPIRATORY (INHALATION)
Status: DISCONTINUED | OUTPATIENT
Start: 2022-11-02 | End: 2022-11-10 | Stop reason: HOSPADM

## 2022-11-02 RX ORDER — METHYLPREDNISOLONE SODIUM SUCCINATE 125 MG/2ML
100 INJECTION, POWDER, LYOPHILIZED, FOR SOLUTION INTRAMUSCULAR; INTRAVENOUS ONCE
Status: COMPLETED | OUTPATIENT
Start: 2022-11-04 | End: 2022-11-04

## 2022-11-02 RX ORDER — MAGNESIUM SULFATE HEPTAHYDRATE 40 MG/ML
INJECTION, SOLUTION INTRAVENOUS PRN
Status: DISCONTINUED | OUTPATIENT
Start: 2022-11-02 | End: 2022-11-02

## 2022-11-02 RX ORDER — LIDOCAINE 40 MG/G
CREAM TOPICAL
Status: DISCONTINUED | OUTPATIENT
Start: 2022-11-02 | End: 2022-11-02

## 2022-11-02 RX ORDER — HYDROMORPHONE HYDROCHLORIDE 1 MG/ML
.3-.5 INJECTION, SOLUTION INTRAMUSCULAR; INTRAVENOUS; SUBCUTANEOUS
Status: DISCONTINUED | OUTPATIENT
Start: 2022-11-02 | End: 2022-11-04

## 2022-11-02 RX ORDER — MYCOPHENOLATE MOFETIL 200 MG/ML
750 POWDER, FOR SUSPENSION ORAL
Status: DISCONTINUED | OUTPATIENT
Start: 2022-11-02 | End: 2022-11-03

## 2022-11-02 RX ORDER — MYCOPHENOLATE MOFETIL 250 MG/1
750 CAPSULE ORAL
Status: DISCONTINUED | OUTPATIENT
Start: 2022-11-02 | End: 2022-11-10 | Stop reason: HOSPADM

## 2022-11-02 RX ORDER — FLUCONAZOLE 2 MG/ML
400 INJECTION, SOLUTION INTRAVENOUS ONCE
Status: DISCONTINUED | OUTPATIENT
Start: 2022-11-02 | End: 2022-11-02

## 2022-11-02 RX ORDER — PAPAVERINE HYDROCHLORIDE 30 MG/ML
INJECTION INTRAMUSCULAR; INTRAVENOUS PRN
Status: DISCONTINUED | OUTPATIENT
Start: 2022-11-02 | End: 2022-11-02 | Stop reason: HOSPADM

## 2022-11-02 RX ORDER — FIBRINOGEN (HUMAN) 700-1300MG
1.1 KIT INTRAVENOUS ONCE
Status: DISCONTINUED | OUTPATIENT
Start: 2022-11-02 | End: 2022-11-03

## 2022-11-02 RX ORDER — FLUCONAZOLE 2 MG/ML
400 INJECTION, SOLUTION INTRAVENOUS ONCE
Status: COMPLETED | OUTPATIENT
Start: 2022-11-03 | End: 2022-11-03

## 2022-11-02 RX ORDER — POTASSIUM CHLORIDE 29.8 MG/ML
20 INJECTION INTRAVENOUS
Status: COMPLETED | OUTPATIENT
Start: 2022-11-02 | End: 2022-11-02

## 2022-11-02 RX ORDER — SENNOSIDES 8.6 MG
2 TABLET ORAL 2 TIMES DAILY
Status: DISCONTINUED | OUTPATIENT
Start: 2022-11-03 | End: 2022-11-03

## 2022-11-02 RX ORDER — SODIUM CHLORIDE, SODIUM GLUCONATE, SODIUM ACETATE, POTASSIUM CHLORIDE AND MAGNESIUM CHLORIDE 526; 502; 368; 37; 30 MG/100ML; MG/100ML; MG/100ML; MG/100ML; MG/100ML
INJECTION, SOLUTION INTRAVENOUS CONTINUOUS PRN
Status: DISCONTINUED | OUTPATIENT
Start: 2022-11-02 | End: 2022-11-02

## 2022-11-02 RX ORDER — SODIUM CHLORIDE, SODIUM LACTATE, POTASSIUM CHLORIDE, CALCIUM CHLORIDE 600; 310; 30; 20 MG/100ML; MG/100ML; MG/100ML; MG/100ML
INJECTION, SOLUTION INTRAVENOUS CONTINUOUS
Status: DISCONTINUED | OUTPATIENT
Start: 2022-11-03 | End: 2022-11-04

## 2022-11-02 RX ORDER — VALGANCICLOVIR 450 MG/1
450 TABLET, FILM COATED ORAL DAILY
Status: DISCONTINUED | OUTPATIENT
Start: 2022-11-03 | End: 2022-11-04

## 2022-11-02 RX ORDER — METHYLPREDNISOLONE SODIUM SUCCINATE 125 MG/2ML
50 INJECTION, POWDER, LYOPHILIZED, FOR SOLUTION INTRAMUSCULAR; INTRAVENOUS ONCE
Status: COMPLETED | OUTPATIENT
Start: 2022-11-05 | End: 2022-11-05

## 2022-11-02 RX ORDER — TACROLIMUS 1 MG/1
2 CAPSULE ORAL
Status: DISCONTINUED | OUTPATIENT
Start: 2022-11-02 | End: 2022-11-03

## 2022-11-02 RX ORDER — NICOTINE POLACRILEX 4 MG
15-30 LOZENGE BUCCAL
Status: DISCONTINUED | OUTPATIENT
Start: 2022-11-02 | End: 2022-11-10 | Stop reason: HOSPADM

## 2022-11-02 RX ORDER — DEXTROSE MONOHYDRATE 100 MG/ML
INJECTION, SOLUTION INTRAVENOUS CONTINUOUS PRN
Status: DISCONTINUED | OUTPATIENT
Start: 2022-11-02 | End: 2022-11-10 | Stop reason: HOSPADM

## 2022-11-02 RX ORDER — PROPOFOL 10 MG/ML
INJECTION, EMULSION INTRAVENOUS PRN
Status: DISCONTINUED | OUTPATIENT
Start: 2022-11-02 | End: 2022-11-02

## 2022-11-02 RX ORDER — PANTOPRAZOLE SODIUM 40 MG/1
40 TABLET, DELAYED RELEASE ORAL DAILY
Status: DISCONTINUED | OUTPATIENT
Start: 2022-11-02 | End: 2022-11-10 | Stop reason: HOSPADM

## 2022-11-02 RX ORDER — ONDANSETRON 2 MG/ML
INJECTION INTRAMUSCULAR; INTRAVENOUS PRN
Status: DISCONTINUED | OUTPATIENT
Start: 2022-11-02 | End: 2022-11-02

## 2022-11-02 RX ORDER — POLYETHYLENE GLYCOL 3350 17 G/17G
17 POWDER, FOR SOLUTION ORAL DAILY
Status: DISCONTINUED | OUTPATIENT
Start: 2022-11-03 | End: 2022-11-08

## 2022-11-02 RX ORDER — OXYCODONE HYDROCHLORIDE 5 MG/1
5 TABLET ORAL ONCE
Status: DISCONTINUED | OUTPATIENT
Start: 2022-11-02 | End: 2022-11-02

## 2022-11-02 RX ADMIN — Medication 20 MG: at 11:30

## 2022-11-02 RX ADMIN — EPINEPHRINE 40 MCG: 1 INJECTION INTRAMUSCULAR; INTRAVENOUS; SUBCUTANEOUS at 12:51

## 2022-11-02 RX ADMIN — SODIUM BICARBONATE 50 MEQ: 84 INJECTION, SOLUTION INTRAVENOUS at 12:55

## 2022-11-02 RX ADMIN — Medication 0.03 MCG/KG/MIN: at 09:10

## 2022-11-02 RX ADMIN — Medication 50 MG: at 08:35

## 2022-11-02 RX ADMIN — HUMAN INSULIN 1 UNITS/HR: 100 INJECTION, SOLUTION SUBCUTANEOUS at 13:02

## 2022-11-02 RX ADMIN — ALBUTEROL SULFATE 2.5 MG: 2.5 SOLUTION RESPIRATORY (INHALATION) at 16:52

## 2022-11-02 RX ADMIN — FENTANYL CITRATE 50 MCG: 50 INJECTION, SOLUTION INTRAMUSCULAR; INTRAVENOUS at 08:53

## 2022-11-02 RX ADMIN — EPINEPHRINE 100 MCG: 1 INJECTION INTRAMUSCULAR; INTRAVENOUS; SUBCUTANEOUS at 13:17

## 2022-11-02 RX ADMIN — EPINEPHRINE 40 MCG: 1 INJECTION INTRAMUSCULAR; INTRAVENOUS; SUBCUTANEOUS at 12:54

## 2022-11-02 RX ADMIN — POTASSIUM CHLORIDE 20 MEQ: 29.8 INJECTION, SOLUTION INTRAVENOUS at 14:24

## 2022-11-02 RX ADMIN — VASOPRESSIN 4 UNITS/HR: 20 INJECTION INTRAVENOUS at 09:40

## 2022-11-02 RX ADMIN — POTASSIUM CHLORIDE 20 MEQ: 29.8 INJECTION, SOLUTION INTRAVENOUS at 19:58

## 2022-11-02 RX ADMIN — FENTANYL CITRATE 250 MCG: 50 INJECTION, SOLUTION INTRAMUSCULAR; INTRAVENOUS at 08:33

## 2022-11-02 RX ADMIN — ONDANSETRON 4 MG: 2 INJECTION INTRAMUSCULAR; INTRAVENOUS at 15:54

## 2022-11-02 RX ADMIN — PIPERACILLIN AND TAZOBACTAM 3.38 G: 3; .375 INJECTION, POWDER, LYOPHILIZED, FOR SOLUTION INTRAVENOUS at 20:09

## 2022-11-02 RX ADMIN — EPINEPHRINE 30 MCG: 1 INJECTION INTRAMUSCULAR; INTRAVENOUS; SUBCUTANEOUS at 13:00

## 2022-11-02 RX ADMIN — CALCIUM CHLORIDE 1 G: 100 INJECTION, SOLUTION INTRAVENOUS at 12:57

## 2022-11-02 RX ADMIN — EPINEPHRINE 20 MCG: 1 INJECTION INTRAMUSCULAR; INTRAVENOUS; SUBCUTANEOUS at 12:57

## 2022-11-02 RX ADMIN — FENTANYL CITRATE 100 MCG/HR: 50 INJECTION, SOLUTION INTRAMUSCULAR; INTRAVENOUS at 09:09

## 2022-11-02 RX ADMIN — SODIUM CHLORIDE, SODIUM GLUCONATE, SODIUM ACETATE, POTASSIUM CHLORIDE AND MAGNESIUM CHLORIDE: 526; 502; 368; 37; 30 INJECTION, SOLUTION INTRAVENOUS at 08:53

## 2022-11-02 RX ADMIN — PIPERACILLIN AND TAZOBACTAM 3.38 G: 3; .375 INJECTION, POWDER, FOR SOLUTION INTRAVENOUS at 13:00

## 2022-11-02 RX ADMIN — EPINEPHRINE 0.03 MCG/KG/MIN: 1 INJECTION INTRAMUSCULAR; INTRAVENOUS; SUBCUTANEOUS at 12:50

## 2022-11-02 RX ADMIN — FUROSEMIDE 20 MG: 10 INJECTION, SOLUTION INTRAVENOUS at 13:55

## 2022-11-02 RX ADMIN — PIPERACILLIN AND TAZOBACTAM 3.38 G: 3; .375 INJECTION, POWDER, FOR SOLUTION INTRAVENOUS at 08:43

## 2022-11-02 RX ADMIN — NOREPINEPHRINE BITARTRATE 12.8 MCG: 1 INJECTION, SOLUTION, CONCENTRATE INTRAVENOUS at 12:52

## 2022-11-02 RX ADMIN — MAGNESIUM SULFATE HEPTAHYDRATE 1 G: 40 INJECTION, SOLUTION INTRAVENOUS at 10:48

## 2022-11-02 RX ADMIN — FUROSEMIDE 20 MG: 10 INJECTION, SOLUTION INTRAVENOUS at 13:30

## 2022-11-02 RX ADMIN — PIPERACILLIN AND TAZOBACTAM 3.38 G: 3; .375 INJECTION, POWDER, FOR SOLUTION INTRAVENOUS at 15:00

## 2022-11-02 RX ADMIN — Medication 30 MG: at 09:41

## 2022-11-02 RX ADMIN — OXYCODONE HYDROCHLORIDE 10 MG: 5 TABLET ORAL at 22:24

## 2022-11-02 RX ADMIN — MYCOPHENOLATE MOFETIL 750 MG: 200 POWDER, FOR SUSPENSION ORAL at 18:10

## 2022-11-02 RX ADMIN — MAGNESIUM SULFATE HEPTAHYDRATE 2 G: 40 INJECTION, SOLUTION INTRAVENOUS at 09:08

## 2022-11-02 RX ADMIN — HYDROMORPHONE HYDROCHLORIDE 1 MG: 2 TABLET ORAL at 04:39

## 2022-11-02 RX ADMIN — POTASSIUM CHLORIDE 20 MEQ: 29.8 INJECTION, SOLUTION INTRAVENOUS at 21:19

## 2022-11-02 RX ADMIN — SUGAMMADEX 200 MG: 100 INJECTION, SOLUTION INTRAVENOUS at 15:27

## 2022-11-02 RX ADMIN — PANTOPRAZOLE SODIUM 40 MG: 40 TABLET, DELAYED RELEASE ORAL at 18:10

## 2022-11-02 RX ADMIN — PROPOFOL 60 MG: 10 INJECTION, EMULSION INTRAVENOUS at 08:35

## 2022-11-02 RX ADMIN — SODIUM CHLORIDE 1000 MG: 9 INJECTION, SOLUTION INTRAVENOUS at 13:00

## 2022-11-02 RX ADMIN — Medication 30 MG: at 12:49

## 2022-11-02 RX ADMIN — SUGAMMADEX 200 MG: 100 INJECTION, SOLUTION INTRAVENOUS at 15:37

## 2022-11-02 RX ADMIN — HUMAN INSULIN 8.5 UNITS/HR: 100 INJECTION, SOLUTION SUBCUTANEOUS at 19:39

## 2022-11-02 RX ADMIN — SODIUM BICARBONATE 50 MEQ: 84 INJECTION, SOLUTION INTRAVENOUS at 12:51

## 2022-11-02 RX ADMIN — METHYLENE BLUE 50 MG: 5 INJECTION INTRAVENOUS at 13:00

## 2022-11-02 RX ADMIN — SODIUM CHLORIDE, SODIUM GLUCONATE, SODIUM ACETATE, POTASSIUM CHLORIDE AND MAGNESIUM CHLORIDE: 526; 502; 368; 37; 30 INJECTION, SOLUTION INTRAVENOUS at 08:20

## 2022-11-02 RX ADMIN — TACROLIMUS 2 MG: 5 CAPSULE ORAL at 18:10

## 2022-11-02 RX ADMIN — DEXTROSE AND SODIUM CHLORIDE: 5; 450 INJECTION, SOLUTION INTRAVENOUS at 17:14

## 2022-11-02 RX ADMIN — NOREPINEPHRINE BITARTRATE 12.8 MCG: 1 INJECTION, SOLUTION, CONCENTRATE INTRAVENOUS at 13:00

## 2022-11-02 RX ADMIN — FLUCONAZOLE, SODIUM CHLORIDE 400 MG: 2 INJECTION INTRAVENOUS at 08:40

## 2022-11-02 RX ADMIN — HYDROMORPHONE HYDROCHLORIDE 0.5 MG: 1 INJECTION, SOLUTION INTRAMUSCULAR; INTRAVENOUS; SUBCUTANEOUS at 15:34

## 2022-11-02 RX ADMIN — MAGNESIUM SULFATE HEPTAHYDRATE 2 G: 40 INJECTION, SOLUTION INTRAVENOUS at 13:00

## 2022-11-02 RX ADMIN — RACEPINEPHRINE HYDROCHLORIDE 0.5 ML: 11.25 SOLUTION RESPIRATORY (INHALATION) at 16:52

## 2022-11-02 RX ADMIN — PIPERACILLIN AND TAZOBACTAM 3.38 G: 3; .375 INJECTION, POWDER, FOR SOLUTION INTRAVENOUS at 10:41

## 2022-11-02 RX ADMIN — NOREPINEPHRINE BITARTRATE 12.8 MCG: 1 INJECTION, SOLUTION, CONCENTRATE INTRAVENOUS at 12:55

## 2022-11-02 RX ADMIN — SODIUM CHLORIDE, POTASSIUM CHLORIDE, SODIUM LACTATE AND CALCIUM CHLORIDE: 600; 310; 30; 20 INJECTION, SOLUTION INTRAVENOUS at 23:59

## 2022-11-02 RX ADMIN — CALCIUM CHLORIDE 1 G: 100 INJECTION, SOLUTION INTRAVENOUS at 12:49

## 2022-11-02 ASSESSMENT — ACTIVITIES OF DAILY LIVING (ADL)
ADLS_ACUITY_SCORE: 26
ADLS_ACUITY_SCORE: 40
ADLS_ACUITY_SCORE: 26
ADLS_ACUITY_SCORE: 38
DEPENDENT_IADLS:: INDEPENDENT
ADLS_ACUITY_SCORE: 26
ADLS_ACUITY_SCORE: 26
ADLS_ACUITY_SCORE: 36
ADLS_ACUITY_SCORE: 40
ADLS_ACUITY_SCORE: 26
ADLS_ACUITY_SCORE: 26

## 2022-11-02 NOTE — ANESTHESIA POSTPROCEDURE EVALUATION
Patient: Susan Puckett    Procedure: Procedure(s):  TRANSPLANT, LIVER, RECIPIENT,  DONOR       Anesthesia Type:  General    Note:  Disposition: ICU            ICU Sign Out: Anesthesiologist/ICU physician sign out WAS performed   Postop Pain Control: Uneventful            Sign Out: Well controlled pain   PONV: No   Neuro/Psych: Uneventful            Sign Out: Acceptable/Baseline neuro status   Airway/Respiratory: Uneventful            Sign Out: Acceptable/Baseline resp. status   CV/Hemodynamics: Uneventful            Sign Out: Acceptable CV status; No obvious hypovolemia; No obvious fluid overload   Other NRE: NONE   DID A NON-ROUTINE EVENT OCCUR? No           Last vitals:  Vitals:    22 1700 22 1824   BP: 114/66 101/61   Pulse: 67 63   Resp: 16 14   Temp: 36.7  C (98.1  F) 36.4  C (97.6  F)   SpO2: 100% 100%       Electronically Signed By: Lincoln Sylvester MD  2022  4:18 PM

## 2022-11-02 NOTE — PROGRESS NOTES
"SPIRITUAL HEALTH SERVICES: Tele-Encounter  Patient Location: [facility] ([place]) 7A  ON CALL  Spoke with: Susanlawrence Puckett    Referral Source: Page for prayer before surgery    Visited pt Susan over the phone per her requests for spiritual support before she goes into surgery for a liver transplant.    Susan says she's feeling \"scared, overwhelmed, and blessed\" today.    She shared that she was raised/baptized [employer] and now attends [employer]. God and her larisa is a source of comfort and strength.    Susan requested prayer for:  -her anxiety, fear  -her medical team  -that her body would accept the new liver  -her donor's family (including Clayton - her \"new hero\" - who is her donor)  -road to recovery    I prayed with Susan over the phone. Provided emotional support and validation.    PLAN: Will defer to unit  for follow up.  remains available per patient request.    Laya Finney, [facility]  Chaplain Resident  Pager [phone]      ______________________________    Type of service:  Telephone Visit     has received verbal consent for a TelephoneVisit from the patient? Yes    Distance Provider Location: designated [place] office or home office (secure setting)    Mode of Communication: telephone (via [employer] phone or [employer] tele-call-number ([phone]))      * [facility] remains available 24/7 for emergent requests/referrals, either by having the switchboard page the on-call  or by entering an ASAP/STAT consult in Epic (this will also page the on-call ). Routine Epic consults receive an initial response within 24 hours.*    "

## 2022-11-02 NOTE — PLAN OF CARE
Major Shift Events: Patient remains drowsy post op, follows some simple commands, opens eyes to voice and repeated stimulation. Perrl. Has not voiced any words, remains groaning with oral airway in place. Sinus rhythm with no signs of ectopy. Goal of MAP greater than 60, off all pressor. 2+ pulses. Afebrile. Sating adequately on 6L facemask. Oral airway remains in place until patient is alert enough to remove. PRN nebs given by RT. Coarse lung sounds. OG placed @ 65, due to patient not being alert enough for oral medications. Rossi in place with high UOP. No bm, hypoactive bowel sounds. D5 1/2 NS @ 100, insulin gtt alg 3.  Rivera at bedside.   Plan: Continue close monitoring and frequent lab studies in ICU.   For vital signs and complete assessments, please see documentation flowsheets.

## 2022-11-02 NOTE — PROGRESS NOTES
STAFF NOTE:  52F hx CUETO cirrhosis, s/p TIPS with history of recurrent pleural effusions and a variety of other cirrhosis-related complications; RNYGB; GERD, DVT, DM2, seizures, hypothyroid, HSV, depression.  She was COVID positive, but tested positive again pre-op.  To OR for directed-donor liver transplant  Uncomplicated surgery other than liver somewhat congested early during the procedure    Exam extubated, oral airway in place  Somewhat wheezy breathing  Dressing pealing back slightly  R internal jugular with hands-free; no swan  JPs normal degree of serosanguinous output  Rossi somewhat concentrated    Post-op labs pending     CXR pending  Liver doppler initial report is that decreased flows in R hepatic artery    This is a 52F hx CUETO cirrhosis, s/p TIPS with history of recurrent pleural effusions and a variety of other cirrhosis-related complications; RNYGB; GERD, DVT, DM2, seizures, hypothyroid, HSV, depression.  She was COVID positive, but tested positive again pre-op.  She looks incredible coming out of the operating room.    She will likely continue to need some volume resuscitation overnight, and we will see what transplant thinks about her doppler findings.    I anticipate we will be able to quickly come off pressors, but she needs some racemic epi for now.  Potentially will also be a BIPAP candidate this evening.    DEPRESSION:   -escitalopram    ENCEPHALOPATHY / DELIRIUM:  -due to hepatic encephalopathy; monitoring with clinical exam  -lactulose can stop post-op  -nothing for sleep; no need for sedation at this time     ACUTE ON CHRONIC LIVER FAILURE S/P TRANSPLANT:  -CUETO cirrhosis as underlying cause of liver failure  -induction immunosuppression with methylprednisolone/prednisone taper through POD #5.  Will eventually continue prednisone at 5 mg PO daily until tacrolimus level is therapeutic  -maintenance immunosuppression to include mycophenolate and tacrolimus with anticipated goal trough levels  of 8-12 (to be determined by surgical service) mcg/L for 0-3 months post-transplant.    -Surgical prophylaxis includes: piperacillin-tazobactam IV for 48 hours and diflucan  -Opportunistic pathogen prophylaxis includes: trimethoprim/sulfamethoxazole, valganciclovir, and topical antifungal coverage to begin once systemic antifungal therapy is complete.  -oral and IV narcotics available    THROMBOCYTOPENIA AND ACUTE BLOOD LOSS ANEMIA ON ANEMIA OF CHRONIC DISEASE:  -thrombocytopenia was present prior to admission due to liver disease; some consumption with blood loss intra-operatively  -monitoring with q4-6h Hgb & other labs in the immediate post-op period.    -Will use a transfusion trigger of Hgb <8, INR >2, Platelet count <20, Fibrinogen <200 in the acute post-op period    COAGULOPATHY:  -present prior to admission due to liver disease, exacerbated by surgical blood loss  -transfusion triggers as above    INADEQUATE ORAL INTAKE:  -hx RNYGB, so will need IR-placed post-pyloric feeding tube in the morning    DM2:  -insulin gtt for now    HYPOTHYROID:  -levothyroxine    MISC:  -full code  -family updated by primary team  -SQH not necessary immediately post-op; H2 blocker for steroids  -lines: leave introducers and A line for now  -galloway to remain while resuscitating  -anticipate discharge to acute rehab in >2 weeks    Billing statement: 33min of critical care time; spent in an initial review of imaging, labs, physical exam, and discussion of the patient with my own team and the extended care team including the primary service; Based on this patient's presentation / recent intervention and my bedside assessment, I felt there was or is a reasonably high probability of imminent or life-threatening deterioration today or tonight for graft-related reasons.   My overall critical care time, as described in detail above, includes such things as coordination of care, arrhythmia and hemodynamics management with infusions of  pressors , respiratory management, fluid therapy including fluid boluses, and pain and sedation therapy. This time excludes time I spent personally performing or supervising procedures for this patient.    CAM Lerma MD  Clinical   Anesthesia / Critical Care  *[phone]

## 2022-11-02 NOTE — PHARMACY-ADMISSION MEDICATION HISTORY
Admission Medication History Completed by Pharmacy    See [facility] Admission Navigator for allergy information, preferred outpatient pharmacy, prior to admission medications and immunization status.     Medication History Sources:     Patient, spouse, refill history, chart review    Changes made to PTA medication list (reason):    Added: sitagliptin (Januvia)    Deleted: valacyclovir (deleted per patient, only for cold sores, no refills in past 12 months)    Changed:   o Lantus 7 Units daily --TO-- 5 Units daily (per patient, verified with refill history)  o Levothyroxine 175 mcg daily --TO-- levothyroxine 150 mcg daily (per patient, verified with refill history)    Additional Information:    Patient ws a reliable historian but confused on some things (double checked with her spouse) and was made more confused by several interruptions during our medication history this evening.     Initially spoke with patient upon arrival due to listed allergy to methylprednisolone (hives) and this being a required medications per our Liver Transplant induction protocol.  o Patient reports reaction occurred in 1999 or earlier, does not recall if the reaction occurred right away. Patient broke out in hives (described as round, flat) in her neck and chest area. She stopped taking the medication and the reaction went away on its own. No recollection of swelling, shortness of breath. Does not recall how this medication was taken (IV or PO) nor what it was prescribed for.  o Patient's reaction to prednisone also discussed (anxiety, rash, hives). Patient reports reaction was mostly anxiety, did not mention hives or rash.   o Discussed reaction with primary team and agreed to premedicate patient with Benadryl and have additional anti-hypersensitivity agents available PRN. Also discussed with patient who has agreed to proceed with agents to treat reaction available.    Per patient, does not carb count for insulin coverage of meals. Utilizes  "a \"cheat sheet\" given to her, described as sliding scale.     Patient was not clear on whether she was taking calcium carbonate- vitamin D ([place], prescription from Dr. Cook) and/or calcium citrate PO (per spouse, patient is taking calcium citrate).       Prior to Admission medications    Medication Sig Last Dose Taking? Auth Provider Long Term End Date   acetaminophen (TYLENOL) 325 MG tablet Take 1 tablet (325 mg) by mouth every 4 hours as needed for mild pain Past Month Yes Melissa Parish MD No    amylase-lipase-protease (CREON 24) 34626-98135 units CPEP per EC capsule Take 1 capsule by mouth Take with snacks or supplements (with snacks) 11/1/2022 at am Yes Melissa Parish MD     amylase-lipase-protease (CREON 24) 96132-41866 units CPEP per EC capsule Take 2 capsules by mouth 3 times daily (with meals). May also take 1 capsule Take with snacks or supplements (for meals/snacks). 11/1/2022 at am Yes Melissa Parish MD     atorvastatin (LIPITOR) 20 MG tablet Take 20 mg by mouth every evening  10/31/2022 at pm Yes Reported, Patient Yes    calcium carbonate-vitamin D (OSCAL W/D) 500-200 MG-UNIT tablet Take 1 tablet by mouth 2 times daily (with meals) Take one tab once in AM and once in PM with meals 11/1/2022 at am Yes Raphael Cook MD     CALCIUM CITRATE PO Take 600 mg by mouth 2 times daily 11/1/2022 Yes Unknown, Entered By History     cyanocobalamin (VITAMIN B-12) 1000 MCG tablet Take 1,000 mcg by mouth every 7 days Take 1 tablet by mouth every 7 days on Wednesdays 10/26/2022 Yes Reported, Patient     escitalopram (LEXAPRO) 10 MG tablet Take 1 tablet (10 mg) by mouth daily 11/1/2022 at am Yes Sis Yepez Yes    famotidine (PEPCID) 20 MG tablet Take 20 mg by mouth every morning (before breakfast) 11/1/2022 at am Yes Unknown, Entered By History     folic acid (FOLVITE) 1 MG tablet Take 1 mg by mouth every morning 11/1/2022 at am Yes Reported, Patient     furosemide (LASIX) " 40 MG tablet Take 1.5 tablets (60 mg) by mouth daily 11/1/2022 at am Yes Raphael Cook MD Yes    insulin aspart (NOVOPEN ECHO) 100 UNIT/ML cartridge 0.5 unit(s) per 14g cho AC breakfast/lunch AND 0.5 unit(s) per 20 g cho for dinner AND NO meal insulin after 2000 hrs.  Snack coverage:   0800 - 1700 - 0.5 unit(s) per 14 g cho and 1701 - 2000 0.5 unit(s) per 20 g cho 11/1/2022 Yes Melissa Parish MD Yes    insulin glargine (LANTUS PEN) 100 UNIT/ML pen Inject 5 Units Subcutaneous every 24 hours 11/1/2022 at am Yes Melissa Parish MD Yes    lactulose (CHRONULAC) 10 GM/15ML solution Take 45 mLs (30 g) by mouth 3 times daily 11/1/2022 at am Yes Raphael Cook MD     levothyroxine (SYNTHROID/LEVOTHROID) 150 MCG tablet Take 1 tablet by mouth daily 11/1/2022 at am Yes Unknown, Entered By History No    melatonin 5 MG tablet Take 5 mg by mouth At Bedtime 10/31/2022 Yes Unknown, Entered By History     multivitamin CF FORMULA (DEKAS PLUS) capsule Take 1 capsule by mouth daily 10/31/2022 at pm Yes Raphael Cook MD     ondansetron (ZOFRAN ODT) 8 MG ODT tab Take 1 tablet by mouth every 8 hours as needed for nausea More than a month Yes Unknown, Entered By History     pregabalin (LYRICA) 25 MG capsule Take 1 capsule (25 mg) by mouth daily 10/31/2022 at pm Yes Sis Yepez Yes    prochlorperazine (COMPAZINE) 10 MG tablet Take 10 mg by mouth every 6 hours as needed for nausea, vomiting or nausea More than a month Yes Unknown, Entered By History     rifampin (RIFADIN) 300 MG capsule Take 1 capsule (300 mg) by mouth 2 times daily 11/1/2022 at am Yes Raphael Cook MD     rizatriptan (MAXALT) 10 MG tablet Take 10 mg by mouth at onset of headache for migraine Unknown Yes Reported, Patient     sitagliptin (JANUVIA) 100 MG tablet Take 100 mg by mouth daily 11/1/2022 at am Yes Unknown, Entered By History Yes    spironolactone (ALDACTONE) 50 MG tablet Take 3 tablets (150 mg) by mouth daily 11/1/2022 at am Yes Joey  Raphael WALTERS MD Yes    topiramate (TOPAMAX) 50 MG tablet Take 50 mg by mouth daily  11/1/2022 at am Yes Reported, Patient     traZODone (DESYREL) 100 MG tablet Take 0.5 tablets (50 mg) by mouth At Bedtime 10/31/2022 at pm Yes Sis Yepez Yes    vitamin A 3 MG (85053 UNITS) capsule Take 10,000 Units by mouth daily  11/1/2022 at am Yes Reported, Patient     vitamin C (ASCORBIC ACID) 1000 MG TABS Take 1,000 mg by mouth daily  11/1/2022 at am Yes Reported, Patient     vitamin D3 (CHOLECALCIFEROL) 50 mcg (2000 units) tablet Take 1 tablet (50 mcg) by mouth daily Take one tablet daily 11/1/2022 at am Yes Raphael Cook MD     vitamin E (TOCOPHEROL) 400 units (180 mg) capsule Take 400 Units by mouth daily  11/1/2022 at am Yes Reported, Patient     XIFAXAN 550 MG TABS tablet TAKE 1 TABLET (550 MG) BY MOUTH 2 TIMES DAILY 11/1/2022 at am Yes Raphael Cook MD     Continuous Blood Gluc  (DEXCOM G6 ) LUNA Use as directed for continuous glucose monitoring.   Reported, Patient     Continuous Blood Gluc Sensor (DEXCOM G6 SENSOR) MISC Use as directed for continuous glucose monitoring.  Change sensor every 10 days.   Reported, Patient     Continuous Blood Gluc Transmit (DEXCOM G6 TRANSMITTER) MISC Use as directed for continuous glucose monitoring.  Change every 3 months.   Reported, Patient     insulin pen needle (BD GRISEL U/F) 32G X 4 MM miscellaneous Inject 1 each Subcutaneous   Reported, Patient         Date completed: 11/01/22    Medication history completed by: Laura Boyd, IngrisD

## 2022-11-02 NOTE — ANESTHESIA PROCEDURE NOTES
Central Line/PA Catheter Placement    Pre-Procedure   Staff -        Anesthesiologist:  Lincoln Sylvester MD       Resident/Fellow: Mart Liang Jr., MD       Performed By: resident       Location: OR       Pre-Anesthestic Checklist: patient identified, IV checked, site marked, risks and benefits discussed, informed consent, monitors and equipment checked, pre-op evaluation and at physician/surgeon's request  Timeout:       Correct Patient: Yes        Correct Procedure: Yes        Correct Site: Yes        Correct Position: Yes        Correct Laterality: Yes   Line Placement:   This line was placed Post Induction    Procedure   Procedure: central line       Laterality: right       Insertion Site: internal jugular.       Patient Position: Trendelenburg  Sterile Prep        All elements of maximal sterile barrier technique followed       Patient Prep/Sterile Barriers: draped, hand hygiene, gloves , hat , mask , draped, gown, sterile gel and probe cover       Skin prep: Chloraprep  Insertion/Injection        Technique: ultrasound guided and Seldinger Technique        1. Ultrasound was used to evaluate the access site.       2. Vein evaluated via ultrasound for patency/adequacy.       3. Using real-time ultrasound the needle/catheter was observed entering the artery/vein.       Introducer Type: 9 Fr, 2-lumen MAC        Type: PA/CVC with Introducer       Number of Lumens: quad lumen  Narrative         Secured by: suture       Tegaderm dressing used.       Complications: None apparent,        blood aspirated from all lumens,        Verification method: Ultrasound

## 2022-11-02 NOTE — PROGRESS NOTES
Called by transplant surgery the evening of 11/1/22 due to positive COVID PCR (Ct value 39.7).   Upon review of the chart she tested positive 7/30/22 and 8/9/22. Tested negative 8/23/22 and then tested positive 11/1/22. CXR clear. Received 3 covid 19 vaccinations (last 9/2021). Has not received evusheld. She had an episode of SOB last week prompting a PCP visit per transplant team. This has since resolved and she is currently asymptomatic. No COVID testing performed at this time.  The higher Ct value could be reflective of a previous infection with intermittent shedding of virus given immunocompromised state prior to transplant. Okay from an ID perspective to go forward with the transplant. Okay to place in COVID 19 recovered precautions and to utilize surgical masks during the transplant. Informally discussed with IPC as well.  Also paged on call IPC nurse.     Halie Lindo  Infectious Diseases

## 2022-11-02 NOTE — ANESTHESIA PROCEDURE NOTES
Arterial Line Procedure Note    Pre-Procedure   Staff -        Anesthesiologist:  Lincoln Sylvester MD       Resident/Fellow: Mart Liang Jr., MD       Performed By: resident       Location: OR       Pre-Anesthestic Checklist: patient identified, IV checked, risks and benefits discussed, informed consent, monitors and equipment checked, pre-op evaluation and at physician/surgeon's request  Timeout:       Correct Patient: Yes        Correct Procedure: Yes        Correct Site: Yes        Correct Position: Yes   Line Placement:   This line was placed Post Induction  Procedure   Procedure: arterial line       Laterality: right       Insertion Site: radial.  Sterile Prep        Standard elements of sterile barrier followed       Skin prep: Chloraprep  Insertion/Injection        Technique: ultrasound guided and Seldinger Technique        1. Ultrasound was used to evaluate the access site.       2. Artery evaluated via ultrasound for patency/adequacy.       3. Using real-time ultrasound the needle/catheter was observed entering the artery/vein.       Catheter Type/Size: 20 G, 12 cm  Narrative        Tegaderm dressing used.       Complications: None apparent,        Arterial waveform: Yes

## 2022-11-02 NOTE — BRIEF OP NOTE
[facility]    Brief Operative Note    Pre-operative diagnosis: End stage liver disease (H) [K72.10]  Post-operative diagnosis Same as pre-operative diagnosis    Procedure: Procedure(s):  TRANSPLANT, LIVER, RECIPIENT,  DONOR  Surgeon: Surgeon(s) and Role:     * Delbert Morgan MD - Primary     * Sunday Ricci MD - Fellow - Assisting     * Paulie Miranda MD - Fellow - Assisting  Anesthesia: General   Estimated Blood Loss: 1400    Drains: [id number]  Specimens:   ID Type Source Tests Collected by Time Destination   1 : Native liver Tissue Liver SURGICAL PATHOLOGY EXAM Delbert Morgan MD 2022 11:46 AM    2 : donor gallbladder Tissue Gallbladder SURGICAL PATHOLOGY EXAM Delbert Morgan MD 2022  1:25 PM      Findings:    Portal vein flow 1.7 L/min, hepatic artery flow 200cc/min.  Complications: None.  Implants: * No implants in log *

## 2022-11-02 NOTE — PROGRESS NOTES
[facility]    ICU History and Physical    Primary Team: Transplant Surgery  Reason for Critical Care Admission: post operative mgmt  Admitting Physician: Delbert Morgan MD  Date of Admission:  11/1/2022    Assessment: Critical Care     SURGICAL ICU PROGRESS NOTE  11/02/2022        Date of Service: 11/02/2022    ASSESSMENT:  Susan Puckett is a 52 year old female with a past medical history significant for obesity s/p RNY gastric bypass surgery (2006), GERD, line associated upper extremity DVT (2/2022), anemia, diabetes, history of diabetic seizure, hypothyroidism, HSV, depressive disorder, anxiety, migraines, and 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 Her cirrhosis has been c/b ascites, HE, EV, and hepatic hydrothorax s/p frequent thoracentesis. Patient now presents 11/2 for DDLT and admitted to SICU post-op for further management.     Intra-operatively, EBL @ 1400cc. She was given 6.3L plasmalyte, 1.1L CellSaver, 5U pRBCs, 5U FFP, and 2U plts.     CHANGES and MAJOR THINGS TODAY:   -- Pre-op appointment 11/1 with + COVID PCR; ID notified. Previously tested positive in July, and do not suspect active infection    PLAN:    Neurological:  # Acute postoperative pain   # Depression/Anxiety   # Hx of hepatic encephalopathy   # Hx of hypoglycemic seizures   - Monitor neurological status. Delirium prevention and precautions.   - Pain: dilaudid PRN, oxycodone PRN. Hold PTA pregabalin 25mg BID   - Resume PTA Escitalopram 10mg, PTA Rizatriptan 10mg PRN, Topiramate 50mg daily, and Trazadone 50mg at bedtime      Pulmonary:  # Acute Hypoxic Respiratory Insufficency  # refractory hepatic hydrothorax s/p multiple thoracenteses  - incentive spirometer Q1H while awake      - Supplemental oxygen to keep saturation above 92 %  - extubated in OR     Cardiovascular:    # Hyperlipidemia  # Shock likely hemorrhagic (resolved)  - EBL @ 1400cc. She was given 6.3L plasmalyte, 1.1L CellSaver, 5U pRBCs,  5U FFP, and 2U plts  - weaned off levo and vaso shortly after arriving on unit  - Monitor hemodynamic status. MAP > 65  - hold PTA Atorvastatin    GI/Nutrition:    # ESLD s/p DDLT 11/2   # Hx of CUETO cirrhosis s/p TIPS 11/2021   # Hx of RNY gastric bypass in 2006   # At risk for protein calorie deficit malnutrition  # Hx of GERD   # Pancreatic Insufficiency   Orders Placed This Encounter      NPO per Anesthesia Guidelines for Procedure/Surgery Except for: Meds  - NPO, ok for sips + ice chips  - Resume PTA Creon  - Hold PTA Januvia 100mg    Fluids/Electrolytes:  # Hypokalemia  # Hx of Vit D Deficiency  # Lactic Acidosis   - D5 + 1/2NS  for IV fluid hydration.   - lactate Q4H  - electrolyte replacement protocol  In place.   - hold PTA Vit D3 2000U daily    Renal:   - has indwelling galloway in place  - Urine output  2.1L . Will continue to monitor intake and output.    Intake/Output Summary (Last 24 hours) at 11/2/2022 1155  Last data filed at 11/2/2022 1148  Gross per 24 hour   Intake 3150 ml   Output 250 ml   Net 2900 ml      Endocrine:  # Hypothyroidism   # Stress hyperglycemia   # Diabetes Mellitus (MR A1c of 6.2 11/1/22)  - PTA medications: Synthroid 150mcg   - insulin gtt; Goal to keep BG < 180 for optimal wound healing   - Continue PTA levothyroxine      Recent Labs   Lab 11/02/22  1146 11/02/22  1110 11/02/22  1029 11/02/22  0955 11/01/22  2038   * 131* 134* 125* 130*      Infectious disease/Immunosuppression:   # COVID positive (asymptomatic); Recovered   # Need for long term infection prophylaxis   - Antibiotics:  - Zosyn x 48 hours  - Prophylaxis:   - Received 3 doses of covid vaccination (last 9/2021)  - Bactrim to start 11/3/22  - Valcyte to start on 11/3/22  - Diflucan x1 dose on 11/3/22  # Immunosuppresion  - Mycophenolate 750mg BID  - methylprednisone f/b prednisone (starting on POD4)  - Tacrolimus 2mg BID    Hematology:    # Hx of KARL   # Hx of coagulopathy  # Hx of pancytopenia   # Acute blood  loss anemia   # Hx of superficial thrombus (02/2021)   # Hx of line ass'd UE DVT (2/2022)  - EBL @ 1400cc. She was given 6.3L plasmalyte, 1.1L CellSaver, 5U pRBCs, 5U FFP, and 2U plts  - Hemoglobin 9.8. Monitor and trend. Threshold for transfusion if hgb <8.0 or signs/symptoms of hypoperfusion.     - goal INR < 2 (hold off on additional transfusion unless pt is on pressors), plt goal > 20K  - Hold PTA ferrous gluconate    Musculoskeletal:  # Weakness and deconditioning of critical illness   - Physical and occupational therapy consults once extubated    Skin:  - diligent cares to prevent skin breakdown and wound formation.      General Cares/Prophylaxis:    DVT Prophylaxis: SCDs  GI Prophylaxis: N/A  Restraints: N/A    Lines/ tubes/ drains:  - R radial A-line, ETT, Rossi, PIVx2    Disposition:  - Surgical ICU, ventilatory support, weaning of vasopressor therapy    Patient seen, findings and plan discussed with surgical ICU staff, Dr. 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 Her cirrhosis has been c/b ascites, HE, EV, and hepatic hydrothorax s/p frequent thoracentesis. Patient now presents 11/2 for DDLT and admitted to SICU post-op for further management.     Review of Systems    Unable to obtain due to being lethargic.    Past Medical History    I have reviewed this patient's medical history and updated it with pertinent information if needed.   Past Medical History:   Diagnosis Date     Anemia      Anxiety      Cold sore      Depressive disorder      Diabetes  (H)     Type 2 DM/No Insulin      Encephalopathy      Esophageal varices without bleeding (H)     grade I     History of blood transfusion     10/2019     History of seizure     diabetic seizure     Insomnia      Liver cirrhosis secondary to CUETO (H) 05/28/2020     Migraine      Pleural effusion      Thrombocytopenia (H)      Thyroid disease      Past Surgical History   I have reviewed this patient's surgical history and updated it with pertinent information if needed.  Past Surgical History:   Procedure Laterality Date     APPENDECTOMY      1989     COLONOSCOPY      1/2020 at [facility]      ENT SURGERY  1998    tempanoplasty     GI SURGERY      EGD August 2020 at [facility]      GYN SURGERY  2010    laparoscopic ablation     IR TRANSVEN INTRAHEPATIC PORTOSYST SHUNT  11/5/2021     MAMMOPLASTY REDUCTION BILATERAL  2004     CA STAB PHELBECTOMY VARICOSE VEINS, LESS THAN 10 INCISIONS, ONE EXTREMITY  2020     RNY gastric bypass  01/2006    retoocolic, retrogastric, [facility]     TONSILLECTOMY & ADENOIDECTOMY Bilateral 1978     WISDOM TEETH EXTRACTION       Social History   I have reviewed this patient's social history and updated it with pertinent information if needed.  Social History     Tobacco Use     Smoking status: Never     Smokeless tobacco: Never   Substance Use Topics     Alcohol use: Not Currently     Comment: Last drink was in 2017     Drug use: Never     Family History   I have reviewed this patient's family history and updated it with pertinent information if needed.  Family History   Problem Relation Age of Onset     Anesthesia Reaction Nephew         PONV     Bleeding Disorder No family hx of      Clotting Disorder No family hx of      Prior to Admission Medications   Prior to Admission Medications   Prescriptions Last Dose Informant Patient Reported? Taking?   CALCIUM CITRATE PO 11/1/2022 Spouse/Significant Other Yes Yes   Sig: Take 600 mg by mouth 2 times daily   Continuous Blood Gluc   (DEXCOM G6 ) LUNA  Spouse/Significant Other Yes No   Sig: Use as directed for continuous glucose monitoring.   Continuous Blood Gluc Sensor (DEXCOM G6 SENSOR) MISC  Spouse/Significant Other Yes No   Sig: Use as directed for continuous glucose monitoring.  Change sensor every 10 days.   Continuous Blood Gluc Transmit (DEXCOM G6 TRANSMITTER) MISC  Spouse/Significant Other Yes No   Sig: Use as directed for continuous glucose monitoring.  Change every 3 months.   XIFAXAN 550 MG TABS tablet 11/1/2022 at am Spouse/Significant Other No Yes   Sig: TAKE 1 TABLET (550 MG) BY MOUTH 2 TIMES DAILY   acetaminophen (TYLENOL) 325 MG tablet Past Month  Yes Yes   Sig: Take 1 tablet (325 mg) by mouth every 4 hours as needed for mild pain   amylase-lipase-protease (CREON 24) 71939-43182 units CPEP per EC capsule 11/1/2022 at am  No Yes   Sig: Take 1 capsule by mouth Take with snacks or supplements (with snacks)   amylase-lipase-protease (CREON 24) 31481-59943 units CPEP per EC capsule 11/1/2022 at am  No Yes   Sig: Take 2 capsules by mouth 3 times daily (with meals). May also take 1 capsule Take with snacks or supplements (for meals/snacks).   atorvastatin (LIPITOR) 20 MG tablet 10/31/2022 at pm Spouse/Significant Other Yes Yes   Sig: Take 20 mg by mouth every evening    calcium carbonate-vitamin D (OSCAL W/D) 500-200 MG-UNIT tablet 11/1/2022 at am Spouse/Significant Other No Yes   Sig: Take 1 tablet by mouth 2 times daily (with meals) Take one tab once in AM and once in PM with meals   cyanocobalamin (VITAMIN B-12) 1000 MCG tablet 10/26/2022 Spouse/Significant Other Yes Yes   Sig: Take 1,000 mcg by mouth every 7 days Take 1 tablet by mouth every 7 days on Wednesdays   escitalopram (LEXAPRO) 10 MG tablet 11/1/2022 at am Spouse/Significant Other No Yes   Sig: Take 1 tablet (10 mg) by mouth daily   famotidine (PEPCID) 20 MG tablet 11/1/2022 at am Spouse/Significant Other Yes Yes   Sig: Take 20 mg by mouth every morning (before  breakfast)   folic acid (FOLVITE) 1 MG tablet 2022 at am Spouse/Significant Other Yes Yes   Sig: Take 1 mg by mouth every morning   furosemide (LASIX) 40 MG tablet 2022 at am  No Yes   Sig: Take 1.5 tablets (60 mg) by mouth daily   insulin aspart (NOVOPEN ECHO) 100 UNIT/ML cartridge 2022  No Yes   Si.5 unit(s) per 14g cho AC breakfast/lunch AND 0.5 unit(s) per 20 g cho for dinner AND NO meal insulin after 2000 hrs.  Snack coverage:   0800 - 1700 - 0.5 unit(s) per 14 g cho and 1701 - 2000 0.5 unit(s) per 20 g cho   insulin glargine (LANTUS PEN) 100 UNIT/ML pen 2022 at am  Yes Yes   Sig: Inject 5 Units Subcutaneous every 24 hours   insulin pen needle (BD GRISEL U/F) 32G X 4 MM miscellaneous  Spouse/Significant Other Yes No   Sig: Inject 1 each Subcutaneous   lactulose (CHRONULAC) 10 GM/15ML solution 2022 at am  No Yes   Sig: Take 45 mLs (30 g) by mouth 3 times daily   levothyroxine (SYNTHROID/LEVOTHROID) 150 MCG tablet 2022 at am Spouse/Significant Other Yes Yes   Sig: Take 1 tablet by mouth daily   melatonin 5 MG tablet 10/31/2022 Spouse/Significant Other Yes Yes   Sig: Take 5 mg by mouth At Bedtime   multivitamin CF FORMULA (DEKAS PLUS) capsule 10/31/2022 at pm Spouse/Significant Other No Yes   Sig: Take 1 capsule by mouth daily   ondansetron (ZOFRAN ODT) 8 MG ODT tab More than a month Spouse/Significant Other Yes Yes   Sig: Take 1 tablet by mouth every 8 hours as needed for nausea   pregabalin (LYRICA) 25 MG capsule 10/31/2022 at pm Spouse/Significant Other No Yes   Sig: Take 1 capsule (25 mg) by mouth daily   prochlorperazine (COMPAZINE) 10 MG tablet More than a month Spouse/Significant Other Yes Yes   Sig: Take 10 mg by mouth every 6 hours as needed for nausea, vomiting or nausea   rifampin (RIFADIN) 300 MG capsule 2022 at am  No Yes   Sig: Take 1 capsule (300 mg) by mouth 2 times daily   rizatriptan (MAXALT) 10 MG tablet Unknown Spouse/Significant Other Yes Yes   Sig: Take 10  mg by mouth at onset of headache for migraine   sitagliptin (JANUVIA) 100 MG tablet 11/1/2022 at am  Yes Yes   Sig: Take 100 mg by mouth daily   spironolactone (ALDACTONE) 50 MG tablet 11/1/2022 at am  No Yes   Sig: Take 3 tablets (150 mg) by mouth daily   topiramate (TOPAMAX) 50 MG tablet 11/1/2022 at am Spouse/Significant Other Yes Yes   Sig: Take 50 mg by mouth daily    traZODone (DESYREL) 100 MG tablet 10/31/2022 at pm Spouse/Significant Other Yes Yes   Sig: Take 0.5 tablets (50 mg) by mouth At Bedtime   vitamin A 3 MG (14577 UNITS) capsule 11/1/2022 at am Spouse/Significant Other Yes Yes   Sig: Take 10,000 Units by mouth daily    vitamin C (ASCORBIC ACID) 1000 MG TABS 11/1/2022 at am Spouse/Significant Other Yes Yes   Sig: Take 1,000 mg by mouth daily    vitamin D3 (CHOLECALCIFEROL) 50 mcg (2000 units) tablet 11/1/2022 at am  No Yes   Sig: Take 1 tablet (50 mcg) by mouth daily Take one tablet daily   vitamin E (TOCOPHEROL) 400 units (180 mg) capsule 11/1/2022 at am Spouse/Significant Other Yes Yes   Sig: Take 400 Units by mouth daily       Facility-Administered Medications: None     Allergies   Allergies   Allergen Reactions     Methylprednisolone Hives     Per patient, reaction occurred in 1999 or earlier. Broke out in round, flat hives in neck and chest area. No shortness of breath or swelling. Unknown if reaction occurred immediately after administration.      Droperidol Anxiety     Nsaids Other (See Comments)     GI bleed.     Prednisone Anxiety, Hives and Rash     Physical Exam   Vital Signs: Temp: 97.6  F (36.4  C) Temp src: Oral BP: 101/61 Pulse: 63   Resp: 14 SpO2: 100 %      Weight: 0 lbs 0 oz    General: lethargic, resting comfortably in bed  HEENT: no scleral icterus, normocephalic  Neuro: no focal neuro deficits  CV: normal rate and regular rhythm  Pulm: decreased breath sounds bibasilarly, no increased WOB  Abd: dressing over upper abd w, two TERESITA drains in right abd with thin sanguinous  discharge  : galloway in place  Extremities: warm and well perfused  Skin: warm and dry  Incisions: covered with island dressing over upper abdomen    Data   I reviewed all medications, new labs and imaging results over the last 24 hours.  Arterial Blood Gases   Recent Labs   Lab 11/02/22  1317 11/02/22  1301 11/02/22  1236 11/02/22  1146   PH 7.29* 7.37 7.34* 7.35   PCO2 37 39 32* 32*   PO2 176* 101 108* 190*   HCO3 18* 23 17* 17*     Complete Blood Count   Recent Labs   Lab 11/02/22  1317 11/02/22  1301 11/02/22  1236 11/02/22  1146 11/02/22  0955 11/01/22 2038   WBC  --   --   --   --   --  2.7*   HGB 8.0* 6.8* 8.1* 9.0*   < > 8.5*   PLT  --   --   --   --   --  87*    < > = values in this interval not displayed.     Basic Metabolic Panel  Recent Labs   Lab 11/02/22  1317 11/02/22  1301 11/02/22  1236 11/02/22  1146 11/02/22  0955 11/01/22 2038    144 140 140   < > 149*   POTASSIUM 3.3* 3.4* 3.9 3.7   < > 4.1   CHLORIDE  --   --   --   --   --  117*   CO2  --   --   --   --   --  17*   BUN  --   --   --   --   --  9.9   CR  --   --   --   --   --  0.93   * 208* 107* 121*   < > 130*    < > = values in this interval not displayed.     Liver Function Tests  Recent Labs   Lab 11/02/22  1205 11/01/22 2038 11/01/22 2037   AST  --  46*  --    ALT  --  7*  --    ALKPHOS  --  86  --    BILITOTAL  --  1.5*  --    ALBUMIN  --  2.7*  --    INR 2.37*  --  1.82*     Pancreatic Enzymes  Recent Labs   Lab 11/01/22 2038   AMYLASE 45     Coagulation Profile  Recent Labs   Lab 11/02/22  1205 11/01/22 2037   INR 2.37* 1.82*   PTT 46* 42*     IMAGING:  Recent Results (from the past 24 hour(s))   XR Chest 2 Views    Narrative    EXAM: XR CHEST 2 VIEWS  11/1/2022 7:02 PM     HISTORY:  Pre transplant screen       COMPARISON:  Chest radiograph 7/9/2022    FINDINGS:   PA and lateral views of the chest. Midline trachea. Cardiomediastinal  silhouette is within normal limits. No pneumothorax or pleural  effusion. No focal  pulmonary opacity. Visualized upper abdomen is  unremarkable. No acute osseous abnormality.      Impression    IMPRESSION:   No focal airspace disease.    I have personally reviewed the examination and initial interpretation  and I agree with the findings.    KONRAD MORLEY MD         SYSTEM ID:  [id number]

## 2022-11-02 NOTE — H&P
[facility]    ICU History and Physical    Primary Team: Transplant Surgery  Reason for Critical Care Admission: post operative mgmt  Admitting Physician: Delbert Morgan MD  Date of Admission:  11/1/2022    Assessment: Critical Care     SURGICAL ICU PROGRESS NOTE  11/02/2022        Date of Service: 11/02/2022    ASSESSMENT:  Susan Puckett is a 52 year old female with a past medical history significant for obesity s/p RNY gastric bypass surgery (2006), GERD, line associated upper extremity DVT (2/2022), anemia, diabetes, history of diabetic seizure, hypothyroidism, HSV, depressive disorder, anxiety, migraines, and 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 Her cirrhosis has been c/b ascites, HE, EV, and hepatic hydrothorax s/p frequent thoracentesis. Patient now presents 11/2 for DDLT and admitted to SICU post-op for further management.     Intra-operatively, EBL @ 1400cc. She was given 6.3L plasmalyte, 1.1L CellSaver, 5U pRBCs, 5U FFP, and 2U plts.     CHANGES and MAJOR THINGS TODAY:   -- Pre-op appointment 11/1 with + COVID PCR; ID notified. Previously tested positive in July, and do not suspect active infection    PLAN:    Neurological:  # Acute postoperative pain   # Depression/Anxiety   # Hx of hepatic encephalopathy   # Hx of hypoglycemic seizures   - Monitor neurological status. Delirium prevention and precautions.   - Pain: dilaudid PRN, oxycodone PRN. Hold PTA pregabalin 25mg BID   - Resume PTA Escitalopram 10mg, PTA Rizatriptan 10mg PRN, Topiramate 50mg daily, and Trazadone 50mg at bedtime      Pulmonary:  # Acute Hypoxic Respiratory Insufficency  # refractory hepatic hydrothorax s/p multiple thoracenteses  - incentive spirometer Q1H while awake      - Supplemental oxygen to keep saturation above 92 %  - extubated in OR     Cardiovascular:    # Hyperlipidemia  # Shock likely hemorrhagic (resolved)  - EBL @ 1400cc. She was given 6.3L plasmalyte, 1.1L CellSaver, 5U pRBCs,  5U FFP, and 2U plts  - weaned off levo and vaso shortly after arriving on unit  - Monitor hemodynamic status. MAP > 65  - hold PTA Atorvastatin    GI/Nutrition:    # ESLD s/p DDLT 11/2   # Hx of CUETO cirrhosis s/p TIPS 11/2021   # Hx of RNY gastric bypass in 2006   # At risk for protein calorie deficit malnutrition  # Hx of GERD   # Pancreatic Insufficiency   Orders Placed This Encounter      NPO per Anesthesia Guidelines for Procedure/Surgery Except for: Meds  - NPO, ok for sips + ice chips  - Resume PTA Creon  - Hold PTA Januvia 100mg    Fluids/Electrolytes:  # Hypokalemia  # Hx of Vit D Deficiency  # Lactic Acidosis   - D5 + 1/2NS  for IV fluid hydration.   - lactate Q4H  - electrolyte replacement protocol  In place.   - hold PTA Vit D3 2000U daily    Renal:   - has indwelling galloway in place  - Urine output  2.1L . Will continue to monitor intake and output.    Intake/Output Summary (Last 24 hours) at 11/2/2022 1155  Last data filed at 11/2/2022 1148  Gross per 24 hour   Intake 3150 ml   Output 250 ml   Net 2900 ml      Endocrine:  # Hypothyroidism   # Stress hyperglycemia   # Diabetes Mellitus (MR A1c of 6.2 11/1/22)  - PTA medications: Synthroid 150mcg   - insulin gtt; Goal to keep BG < 180 for optimal wound healing   - Continue PTA levothyroxine      Recent Labs   Lab 11/02/22  1146 11/02/22  1110 11/02/22  1029 11/02/22  0955 11/01/22  2038   * 131* 134* 125* 130*      Infectious disease/Immunosuppression:   # COVID positive (asymptomatic); Recovered   # Need for long term infection prophylaxis   - Antibiotics:  - Zosyn x 48 hours  - Prophylaxis:   - Received 3 doses of covid vaccination (last 9/2021)  - Bactrim to start 11/3/22  - Valcyte to start on 11/3/22  - Diflucan x1 dose on 11/3/22  # Immunosuppresion  - Mycophenolate 750mg BID  - methylprednisone f/b prednisone (starting on POD4)  - Tacrolimus 2mg BID    Hematology:    # Hx of KARL   # Hx of coagulopathy  # Hx of pancytopenia   # Acute blood  loss anemia   # Hx of superficial thrombus (02/2021)   # Hx of line ass'd UE DVT (2/2022)  - EBL @ 1400cc. She was given 6.3L plasmalyte, 1.1L CellSaver, 5U pRBCs, 5U FFP, and 2U plts  - Hemoglobin 9.8. Monitor and trend. Threshold for transfusion if hgb <8.0 or signs/symptoms of hypoperfusion.     - goal INR < 2 (hold off on additional transfusion unless pt is on pressors), plt goal > 20K, fibrinogen > 200  - Hold PTA ferrous gluconate    Musculoskeletal:  # Weakness and deconditioning of critical illness   - Physical and occupational therapy consults once extubated    Skin:  - diligent cares to prevent skin breakdown and wound formation.      General Cares/Prophylaxis:    DVT Prophylaxis: SCDs  GI Prophylaxis: PPI  Restraints: N/A    Lines/ tubes/ drains:  - R radial A-line, ETT, Rossi, PIVx2    Disposition:  - Surgical ICU, ventilatory support, weaning of vasopressor therapy    Patient seen, findings and plan discussed with surgical ICU staff, Dr. 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 Her cirrhosis has been c/b ascites, HE, EV, and hepatic hydrothorax s/p frequent thoracentesis. Patient now presents 11/2 for DDLT and admitted to SICU post-op for further management.     Review of Systems    Unable to obtain due to being lethargic.    Past Medical History    I have reviewed this patient's medical history and updated it with pertinent information if needed.   Past Medical History:   Diagnosis Date     Anemia      Anxiety      Cold sore      Depressive  disorder      Diabetes (H)     Type 2 DM/No Insulin      Encephalopathy      Esophageal varices without bleeding (H)     grade I     History of blood transfusion     10/2019     History of seizure     diabetic seizure     Insomnia      Liver cirrhosis secondary to CUETO (H) 05/28/2020     Migraine      Pleural effusion      Thrombocytopenia (H)      Thyroid disease      Past Surgical History   I have reviewed this patient's surgical history and updated it with pertinent information if needed.  Past Surgical History:   Procedure Laterality Date     APPENDECTOMY      1989     COLONOSCOPY      1/2020 at [facility]      ENT SURGERY  1998    tempanoplasty     GI SURGERY      EGD August 2020 at [facility]      GYN SURGERY  2010    laparoscopic ablation     IR TRANSVEN INTRAHEPATIC PORTOSYST SHUNT  11/5/2021     MAMMOPLASTY REDUCTION BILATERAL  2004     [place]Y STAB PHELBECTOMY VARICOSE VEINS, LESS THAN 10 INCISIONS, ONE EXTREMITY  2020     RNY gastric bypass  01/2006    retoocolic, retrogastric, [facility]     TONSILLECTOMY & ADENOIDECTOMY Bilateral 1978     WISDOM TEETH EXTRACTION       Social History   I have reviewed this patient's social history and updated it with pertinent information if needed.  Social History     Tobacco Use     Smoking status: Never     Smokeless tobacco: Never   Substance Use Topics     Alcohol use: Not Currently     Comment: Last drink was in 2017     Drug use: Never     Family History   I have reviewed this patient's family history and updated it with pertinent information if needed.  Family History   Problem Relation Age of Onset     Anesthesia Reaction Nephew         PONV     Bleeding Disorder No family hx of      Clotting Disorder No family hx of      Prior to Admission Medications   Prior to Admission Medications   Prescriptions Last Dose Informant Patient Reported? Taking?   CALCIUM CITRATE PO 11/1/2022 Spouse/Significant Other Yes Yes   Sig: Take 600 mg by mouth 2 times daily   Continuous  Blood Gluc  (DEXCOM G6 ) LUNA  Spouse/Significant Other Yes No   Sig: Use as directed for continuous glucose monitoring.   Continuous Blood Gluc Sensor (DEXCOM G6 SENSOR) MISC  Spouse/Significant Other Yes No   Sig: Use as directed for continuous glucose monitoring.  Change sensor every 10 days.   Continuous Blood Gluc Transmit (DEXCOM G6 TRANSMITTER) MISC  Spouse/Significant Other Yes No   Sig: Use as directed for continuous glucose monitoring.  Change every 3 months.   XIFAXAN 550 MG TABS tablet 11/1/2022 at am Spouse/Significant Other No Yes   Sig: TAKE 1 TABLET (550 MG) BY MOUTH 2 TIMES DAILY   acetaminophen (TYLENOL) 325 MG tablet Past Month  Yes Yes   Sig: Take 1 tablet (325 mg) by mouth every 4 hours as needed for mild pain   amylase-lipase-protease (CREON 24) 45881-96896 units CPEP per EC capsule 11/1/2022 at am  No Yes   Sig: Take 1 capsule by mouth Take with snacks or supplements (with snacks)   amylase-lipase-protease (CREON 24) 29238-88084 units CPEP per EC capsule 11/1/2022 at am  No Yes   Sig: Take 2 capsules by mouth 3 times daily (with meals). May also take 1 capsule Take with snacks or supplements (for meals/snacks).   atorvastatin (LIPITOR) 20 MG tablet 10/31/2022 at pm Spouse/Significant Other Yes Yes   Sig: Take 20 mg by mouth every evening    calcium carbonate-vitamin D (OSCAL W/D) 500-200 MG-UNIT tablet 11/1/2022 at am Spouse/Significant Other No Yes   Sig: Take 1 tablet by mouth 2 times daily (with meals) Take one tab once in AM and once in PM with meals   cyanocobalamin (VITAMIN B-12) 1000 MCG tablet 10/26/2022 Spouse/Significant Other Yes Yes   Sig: Take 1,000 mcg by mouth every 7 days Take 1 tablet by mouth every 7 days on Wednesdays   escitalopram (LEXAPRO) 10 MG tablet 11/1/2022 at am Spouse/Significant Other No Yes   Sig: Take 1 tablet (10 mg) by mouth daily   famotidine (PEPCID) 20 MG tablet 11/1/2022 at am Spouse/Significant Other Yes Yes   Sig: Take 20 mg by mouth  every morning (before breakfast)   folic acid (FOLVITE) 1 MG tablet 2022 at am Spouse/Significant Other Yes Yes   Sig: Take 1 mg by mouth every morning   furosemide (LASIX) 40 MG tablet 2022 at am  No Yes   Sig: Take 1.5 tablets (60 mg) by mouth daily   insulin aspart (NOVOPEN ECHO) 100 UNIT/ML cartridge 2022  No Yes   Si.5 unit(s) per 14g cho AC breakfast/lunch AND 0.5 unit(s) per 20 g cho for dinner AND NO meal insulin after 2000 hrs.  Snack coverage:   0800 - 1700 - 0.5 unit(s) per 14 g cho and 1701 - 2000 0.5 unit(s) per 20 g cho   insulin glargine (LANTUS PEN) 100 UNIT/ML pen 2022 at am  Yes Yes   Sig: Inject 5 Units Subcutaneous every 24 hours   insulin pen needle (BD GRISEL U/F) 32G X 4 MM miscellaneous  Spouse/Significant Other Yes No   Sig: Inject 1 each Subcutaneous   lactulose (CHRONULAC) 10 GM/15ML solution 2022 at am  No Yes   Sig: Take 45 mLs (30 g) by mouth 3 times daily   levothyroxine (SYNTHROID/LEVOTHROID) 150 MCG tablet 2022 at am Spouse/Significant Other Yes Yes   Sig: Take 1 tablet by mouth daily   melatonin 5 MG tablet 10/31/2022 Spouse/Significant Other Yes Yes   Sig: Take 5 mg by mouth At Bedtime   multivitamin CF FORMULA (DEKAS PLUS) capsule 10/31/2022 at pm Spouse/Significant Other No Yes   Sig: Take 1 capsule by mouth daily   ondansetron (ZOFRAN ODT) 8 MG ODT tab More than a month Spouse/Significant Other Yes Yes   Sig: Take 1 tablet by mouth every 8 hours as needed for nausea   pregabalin (LYRICA) 25 MG capsule 10/31/2022 at pm Spouse/Significant Other No Yes   Sig: Take 1 capsule (25 mg) by mouth daily   prochlorperazine (COMPAZINE) 10 MG tablet More than a month Spouse/Significant Other Yes Yes   Sig: Take 10 mg by mouth every 6 hours as needed for nausea, vomiting or nausea   rifampin (RIFADIN) 300 MG capsule 2022 at am  No Yes   Sig: Take 1 capsule (300 mg) by mouth 2 times daily   rizatriptan (MAXALT) 10 MG tablet Unknown Spouse/Significant Other  Yes Yes   Sig: Take 10 mg by mouth at onset of headache for migraine   sitagliptin (JANUVIA) 100 MG tablet 11/1/2022 at am  Yes Yes   Sig: Take 100 mg by mouth daily   spironolactone (ALDACTONE) 50 MG tablet 11/1/2022 at am  No Yes   Sig: Take 3 tablets (150 mg) by mouth daily   topiramate (TOPAMAX) 50 MG tablet 11/1/2022 at am Spouse/Significant Other Yes Yes   Sig: Take 50 mg by mouth daily    traZODone (DESYREL) 100 MG tablet 10/31/2022 at pm Spouse/Significant Other Yes Yes   Sig: Take 0.5 tablets (50 mg) by mouth At Bedtime   vitamin A 3 MG (41243 UNITS) capsule 11/1/2022 at am Spouse/Significant Other Yes Yes   Sig: Take 10,000 Units by mouth daily    vitamin C (ASCORBIC ACID) 1000 MG TABS 11/1/2022 at am Spouse/Significant Other Yes Yes   Sig: Take 1,000 mg by mouth daily    vitamin D3 (CHOLECALCIFEROL) 50 mcg (2000 units) tablet 11/1/2022 at am  No Yes   Sig: Take 1 tablet (50 mcg) by mouth daily Take one tablet daily   vitamin E (TOCOPHEROL) 400 units (180 mg) capsule 11/1/2022 at am Spouse/Significant Other Yes Yes   Sig: Take 400 Units by mouth daily       Facility-Administered Medications: None     Allergies   Allergies   Allergen Reactions     Methylprednisolone Hives     Per patient, reaction occurred in 1999 or earlier. Broke out in round, flat hives in neck and chest area. No shortness of breath or swelling. Unknown if reaction occurred immediately after administration.      Droperidol Anxiety     Nsaids Other (See Comments)     GI bleed.     Prednisone Anxiety, Hives and Rash     Physical Exam   Vital Signs: Temp: 98.5  F (36.9  C) Temp src: Axillary BP: 101/61 Pulse: 98   Resp: 21 SpO2: 97 % O2 Device: Simple face mask Oxygen Delivery: 3 LPM  Weight: 0 lbs 0 oz    General: lethargic, resting comfortably in bed  HEENT: no scleral icterus, normocephalic  Neuro: no focal neuro deficits  CV: normal rate and regular rhythm  Pulm: decreased breath sounds bibasilarly, no increased WOB  Abd: dressing over  upper abd w, two TERESITA drains in right abd with thin sanguinous discharge  : galloway in place  Extremities: warm and well perfused  Skin: warm and dry  Incisions: covered with island dressing over upper abdomen    Data   I reviewed all medications, new labs and imaging results over the last 24 hours.  Arterial Blood Gases   Recent Labs   Lab 11/02/22  1434 11/02/22  1419 11/02/22  1344 11/02/22  1317   PH 7.32* 7.31* 7.33* 7.29*   PCO2 38 38 35 37   PO2 136* 136* 177* 176*   HCO3 20* 19* 18* 18*     Complete Blood Count   Recent Labs   Lab 11/02/22  1450 11/02/22  1434 11/02/22  1419 11/02/22  1400 11/02/22  0955 11/01/22 2038   WBC  --   --   --   --   --  2.7*   HGB 9.8* 10.0* 9.7* 9.4*   < > 8.5*   PLT 96*  --   --  97*  --  87*    < > = values in this interval not displayed.     Basic Metabolic Panel  Recent Labs   Lab 11/02/22  1726 11/02/22  1626 11/02/22  1434 11/02/22  1419 11/02/22  1344 11/02/22  1317 11/02/22  0955 11/01/22 2038   NA  --   --  142 142 141 142   < > 149*   POTASSIUM  --   --  2.9* 2.9* 3.3* 3.3*   < > 4.1   CHLORIDE  --   --   --   --   --   --   --  117*   CO2  --   --   --   --   --   --   --  17*   BUN  --   --   --   --   --   --   --  9.9   CR  --   --   --   --   --   --   --  0.93   * 220* 263* 265* 251* 234*   < > 130*    < > = values in this interval not displayed.     Liver Function Tests  Recent Labs   Lab 11/02/22  1625 11/02/22  1450 11/02/22  1322 11/02/22  1205 11/01/22 2038   AST  --   --   --   --  46*   ALT  --   --   --   --  7*   ALKPHOS  --   --   --   --  86   BILITOTAL  --   --   --   --  1.5*   ALBUMIN  --   --   --   --  2.7*   INR 1.94* 2.79* 2.75* 2.37*  --      Pancreatic Enzymes  Recent Labs   Lab 11/01/22 2038   AMYLASE 45     Coagulation Profile  Recent Labs   Lab 11/02/22  1625 11/02/22  1450 11/02/22  1322 11/02/22  1205   INR 1.94* 2.79* 2.75* 2.37*   PTT 40* 65* 83* 46*     IMAGING:  Recent Results (from the past 24 hour(s))   XR Chest 2 Views     Narrative    EXAM: XR CHEST 2 VIEWS  11/1/2022 7:02 PM     HISTORY:  Pre transplant screen       COMPARISON:  Chest radiograph 7/9/2022    FINDINGS:   PA and lateral views of the chest. Midline trachea. Cardiomediastinal  silhouette is within normal limits. No pneumothorax or pleural  effusion. No focal pulmonary opacity. Visualized upper abdomen is  unremarkable. No acute osseous abnormality.      Impression    IMPRESSION:   No focal airspace disease.    I have personally reviewed the examination and initial interpretation  and I agree with the findings.    KONRAD MORLEY MD         SYSTEM ID:  [id number]   US Liver Transplant    Impression    RESIDENT PRELIMINARY INTERPRETATION  Impression:   1.  Normal echotexture and echogenicity of the liver with no  peritransplant fluid collection.  2.  High resistance waveform in the right hepatic artery with  resistive index of 1.0. Mildly increased resistive index (0.86) in the  extrahepatic hepatic artery. Likely secondary to postsurgical edema.  Attention on follow-up.

## 2022-11-02 NOTE — PLAN OF CARE
Patient transferred to the OR for DDLT this morning. Four labeled belongings bags remain in the room,  took her cell phone, watch and glasses. PIV saline locked.

## 2022-11-02 NOTE — PROGRESS NOTES
Current PIV assessed, flushed with NSS 10ml with good blood return. No tenderness, swelling or redness on surrounding site. Notified staff nurse on the floor to relay to bedside nurse, writer was unable to contact the bedside nurse.    14765 Echocardiography Transthoracic with Image 2D (Echo/FAST) 66304 Echocardiography Transthoracic with Image 2D (Echo/FAST) 58265 Echocardiography Transthoracic with Image 2D (Echo/FAST)

## 2022-11-02 NOTE — ANESTHESIA PROCEDURE NOTES
Airway       Patient location during procedure: OR       Procedure Start/Stop Times: 11/2/2022 8:36 AM  Staff -        Anesthesiologist:  Lincoln Sylvester MD       Performed By: anesthesiologistIndications and Patient Condition       Indications for airway management: zelda-procedural       Induction type:intravenous       Mask difficulty assessment: 1 - vent by mask    Final Airway Details       Final airway type: endotracheal airway       Successful airway: ETT - single and Oral  Endotracheal Airway Details        ETT size (mm): 8.0       Cuffed: yes       Successful intubation technique: direct laryngoscopy       DL Blade Type: MAC 4       Grade View of Cords: 1       Adjucts: stylet       Position: Right       Measured from: lips       Secured at (cm): 23       Bite block used: None    Post intubation assessment        Placement verified by: capnometry, equal breath sounds and chest rise        Number of attempts at approach: 1       Secured with: pink tape       Ease of procedure: easy       Dentition: Intact and Unchanged    Medication(s) Administered   Medication Administration Time: 11/2/2022 8:36 AM

## 2022-11-02 NOTE — PROGRESS NOTES
RT NOTE:    RT called to provide Racemic epi neb and albuterol neb for post-op liver pt. Pt is still sleepy but breathing spontaneously, extubated in PACU. VSS on 3LPM Oxygen mask. BS coarse/dim bilaterally. No Stridor noted - pt audibally expiratory groaning. No significant change post neb tx.     Pt continues to have oral airway in place, per MD Lerma, keep in place until pt awakens more or reaches for it herself.       Marcela Reynoso, LRT, BSRT-RRT

## 2022-11-02 NOTE — ANESTHESIA PROCEDURE NOTES
Airway       Patient location during procedure: OR       Procedure Start/Stop Times: 11/2/2022 8:36 AM  Staff -        Resident/Fellow: Mart Liang Jr., MD       Performed By: residentIndications and Patient Condition       Indications for airway management: zelda-procedural         Mask difficulty assessment: 1 - vent by mask    Final Airway Details       Final airway type: endotracheal airway       Successful airway: ETT - single  Endotracheal Airway Details        ETT size (mm): 8.0       Cuffed: yes       Successful intubation technique: direct laryngoscopy       DL Blade Type: Fuchs 2       Grade View of Cords: 1       Position: Right       Measured from: gums/teeth       Secured at (cm): 24       Bite block used: None    Post intubation assessment        Placement verified by: capnometry and equal breath sounds        Number of attempts at approach: 1       Number of other approaches attempted: 0       Secured with: pink tape       Ease of procedure: easy       Dentition: Intact and Unchanged    Medication(s) Administered   Medication Administration Time: 11/2/2022 8:36 AM

## 2022-11-02 NOTE — TELEPHONE ENCOUNTER
Organ Offer Encounter Information    Organ Offer Information  Organ offer date & time: 10/31/2022  8:44 PM  Coordinator/Fellow/Attending name: Esther Urias RN   Organ(s):  Organ UNOS ID Match Run ID Comment Organ Laterality   Liver [id number] MNOP; Direct Donation       Recent infections?: No    New medications?: Yes (Comment: Rifampin) Recent pregnancy?: No   Angicoagulation medications?: No Recent vaccinations?: No   Recent blood transfusions?: No Recent hospitalizations?: No   Has your insurance changed in the last 6-12 months?: Neg    Discussed organ offer with: Patient  Discussed risk category with Patient/Other: N/A  Understood donor criteria, verbalized understanding  Patient/Other asked to speak to a surgeon?: No  Discussed program-specific outcomes: Did not have questions regarding SRTR  Right to decline organ offer without penalty, Patient/Other: Aware of option to decline without penalty  Organ offer decision status Patient/Other: Accepted Offer  Organ disposition: Transplanted  Additional Comments: 10/31/2022 8:49 PM  Liver: Local, DBD, Direct Donation  MD: Eddie/Ruben  OPO Contact: Megan [phone]  Donor/Recip HCV Status: Donor NEG  (HCV+ Donors - Discuss HCV genotyping/quant testing with MD & send message to [facility] HCV POOL - Include Donor UNOS ID)  Donor Nutritional Status: Dextrose in IVF  Plan (NPO, Donor OR): Called patient for direct donation liver offer.  Health assessment as above.  Donor OR time TBD.  Will plan to admit patient once donor OR time determined.  - - -   COVID Screening  In the past month, have you:  Or anyone close to you had a positive COVID test or suspected to have COVID: No   Had any COVID symptoms (Fever, Cough, Short of Breath, Loss of Taste/Smell, Rash): No  Veronica Urias RN   Transplant Coordinator    Donor OR Time: 11/2, 0400  Procuring MD: Dr. Jacobo   Contact in the OR:   Organs Being Procured: HR/PIOTR/JALYN/LUANNE/KHADRA  Flush Solution: UW  Biopsy:  no  Pump: no  Special Requests (Special blood tubes, nodes, waivers): noneMD for Visualization: none  Transportation Details: Surgeon pickup 0330 [facility], car to wait and return to post. Dr. Jacobo updated.   Megan Clemens RN    8:46 AM  Donor OR set for 0400. Dr. Miranda updated. Patient called to discuss admit this evening, ETA 1700. Admission instructions provided.  Admissions: 0843, Sindy; patient information provided with ETA to 7a 1700  Unit: 0852, Susan on 7a; bed requested with ETA 1700. Confirmed patient's  can come up for admission, but will need to go home overnight and return prior to scheduled surgery time tomorrow AM. Patient updated on visiting hours.  Immunology: 0949 Mellissa, patient/donor information provided along with ETA 11/2, 0700  Inpatient Lab (COVID Testing [phone], Option 2): RN to confirm STAT run  Book OR: 0942, Flori; OR booked for 11/2 at 0700  Vessel Storage Confirmation (PA/LUANNE/JALYN): Yes, OK to bank. Not increased risk.  Blood Bank: 0947, patient/donor information provided along with ETA 11/2, 0700  Research: ON HOLD  TransNet/ABO Verification: 0942, labels printed; verified receipt  Add Organ: 0859, organ added  Update Provider Entering Orders (XM Plan & COVID Testing):  Liver tx NP paged with patient information and ETA to 7a, 1700.   Megan Clemens RN    9:23 PM  Recipient is covid positive. Dr. Miranda aware and discussing with ID team. Per Dr. Miranda, will move forward with transplant.   Megan Clemens RN    0500  OR entry delayed to 0507. Dr. Miranda updated. Recipient OR moved to 0800.   Megan Clemens RN              Attestation I have discussed all of the above with the Patient/Legal Guardian/Caregiver regarding this organ offer.: Yes  Coordinator/Fellow/Attending name: Esther Urias RN

## 2022-11-02 NOTE — ANESTHESIA CARE TRANSFER NOTE
Patient: Susan Puckett    Procedure: Procedure(s):  TRANSPLANT, LIVER, RECIPIENT,  DONOR       Diagnosis: End stage liver disease (H) [K72.10]  Diagnosis Additional Information: No value filed.    Anesthesia Type:   General     Note:    Oropharynx: oral airway in place and spontaneously breathing  Level of Consciousness: drowsy  Oxygen Supplementation: face mask  Level of Supplemental Oxygen (L/min / FiO2): 6  Independent Airway: airway patency satisfactory and stable  Dentition: dentition unchanged  Vital Signs Stable: post-procedure vital signs reviewed and stable  Report to RN Given: handoff report given  Patient transferred to: ICU  Comments: Pt extubated in OR. Transported to ICU. VSS throughout. Report shared with team at bedside  ICU Handoff: Call for PAUSE to initiate/utilize ICU HANDOFF, Identified Patient, Identified Responsible Provider, Reviewed the Pertinent Medical History, Discussed Surgical Course, Reviewed Intra-OP Anesthesia Management and Issues during Anesthesia, Set Expectations for Post Procedure Period and Allowed Opportunity for Questions and Acknowledgement of Understanding      Vitals:  Vitals Value Taken Time   BP     Temp     Pulse 100 22 1608   Resp 19 22 1608   SpO2 96 % 22 1608   Vitals shown include unvalidated device data.    Electronically Signed By: CAIN Scott CRNA  2022  4:08 PM

## 2022-11-02 NOTE — CONSULTS
Transplant Admission Psychosocial Assessment    Patient Name: Susan Puckett  : 1970  Age: 52 year old  MRN: [id number]  Date of Initial Social Work Evaluation: 10/19/2020    Patient was admitted on 2022 and is currently undergoing a liver transplant. I spoke with Susan on 2022 and updated psychosocial assessment via phone and met with pt's spouse, Rivera and a family friend in the OR waiting room to provide emotional support.     Presenting Information   Living Situation: Susan lives in a single family home in [place], [place] with her spouse, Roly   If not local, plans for short term stay:  They live local   Previous Functional Status: No functional concerns related to transplantation.   Cultural/Language/Spiritual Considerations: She identifies as Shinto and her primary language is English    Support System  Primary Support Person Spouse, Roly.   Other support:  Parents, children and friends   Plan for support in immediate post-transplant period: Spouse, Roly. Of note, Roly will be having his own surgery on Thursday of this week.     Health Care Directive  Decision Maker: Pt when able  Alternate Decision Maker: JANI Dunham   Health Care Directive: Patient unable to complete as she is in the OR    Mental Health/Coping:   History of Mental Health: Susan reports depression/anxiety that is managed by medications through her PCP. She as receiving psychotherapy through [facility] for some time, but discontinues appointments.   History of Chemical Health: No current concerns   Current status: Stable  Coping: Appropriate to situation   Services Needed/Recommended: Continues engagement in liver transplant support group    Financial   Income: Disability and Rloy's employment   Impact of transplant on income: Roly will be unable to work for a period of time   Insurance and medication coverage: [facility]   Financial concerns: Susan has voiced concerns about finances in the past. The are able to  meet their basic needs   Resources needed: None at this time. Will continue to assess     Education provided by : Social Work role inpatient setting, availability of support groups, parking information and liver transplant support group    Assessment and recommendations and plan:    I spoke with Susan via phone on 11/1/2022 as I was notified that her surgery would likely happen in the middle of the night/prior to work hours. She reported feeling anxiety about the surgery and also grateful for the opportunity. She denied any changes to her psychosocial situation. Her spouse, Roly will be having surgery on Thursday of this week. She has adequate support from family/friends.   I met with Roly and a family friend in the OR waiting room. I provided supportive counseling during our visit. No further questions/concerns at this time. Roly voices that he is hopeful that the surgery will go well and that the liver will be functioning. We spoke briefly about the uniqueness of the organ offer. Roly is looking forward to connecting with the donor family. I provided information that they can always go through [employer] as a neurtral party as well. Well discuss further at a later time.     I will continue to follow for psychosocial support throughout Susan's hospitalization.       RENARD Alvarado, [facility]  Liver Transplant   [facility]  Phone: [phone]  Pager: [phone]

## 2022-11-02 NOTE — PROGRESS NOTES
Admitted/transferred from: OR  Reason for admission/transfer: Post-op  2 RN skin assessment: completed by Sindy CHATTERJEE and Allison COREY   Result of skin assessment and interventions/actions: Surgical incisions, no other findings.   Height, weight, drug calc weight: Done  Patient belongings (see Flowsheet)  MDRO education added to care planN/A  ?

## 2022-11-02 NOTE — PROGRESS NOTES
Patient removed from OS waitlist after  donor liver transplant. OS ID [id number].    Donor Has Risk Criteria for Transmission of HIV/HCV/HBV: No  Recipient Notified of Risk Criteria: N/A

## 2022-11-02 NOTE — H&P
STAFF NOTE:  52F hx CUETO cirrhosis, s/p TIPS with history of recurrent pleural effusions and a variety of other cirrhosis-related complications; RNYGB; GERD, DVT, DM2, seizures, hypothyroid, HSV, depression.  She was COVID positive, but tested positive again pre-op.  To OR for directed-donor liver transplant  Uncomplicated surgery other than liver somewhat congested early during the procedure     Exam extubated, oral airway in place  Somewhat wheezy breathing  Dressing pealing back slightly  R internal jugular with hands-free; no swan  JPs normal degree of serosanguinous output  Rossi somewhat concentrated     Post-op labs pending      CXR pending  Liver doppler initial report is that decreased flows in R hepatic artery     This is a 52F hx CUETO cirrhosis, s/p TIPS with history of recurrent pleural effusions and a variety of other cirrhosis-related complications; RNYGB; GERD, DVT, DM2, seizures, hypothyroid, HSV, depression.  She was COVID positive, but tested positive again pre-op.  She looks incredible coming out of the operating room.     She will likely continue to need some volume resuscitation overnight, and we will see what transplant thinks about her doppler findings.     I anticipate we will be able to quickly come off pressors, but she needs some racemic epi for now.  Potentially will also be a BIPAP candidate this evening.     DEPRESSION:   -escitalopram     ENCEPHALOPATHY / DELIRIUM:  -due to hepatic encephalopathy; monitoring with clinical exam  -lactulose can stop post-op  -nothing for sleep; no need for sedation at this time      ACUTE ON CHRONIC LIVER FAILURE S/P TRANSPLANT:  -CUETO cirrhosis as underlying cause of liver failure  -induction immunosuppression with methylprednisolone/prednisone taper through POD #5.  Will eventually continue prednisone at 5 mg PO daily until tacrolimus level is therapeutic  -maintenance immunosuppression to include mycophenolate and tacrolimus with anticipated goal  trough levels of 8-12 (to be determined by surgical service) mcg/L for 0-3 months post-transplant.    -Surgical prophylaxis includes: piperacillin-tazobactam IV for 48 hours and diflucan  -Opportunistic pathogen prophylaxis includes: trimethoprim/sulfamethoxazole, valganciclovir, and topical antifungal coverage to begin once systemic antifungal therapy is complete.  -oral and IV narcotics available     THROMBOCYTOPENIA AND ACUTE BLOOD LOSS ANEMIA ON ANEMIA OF CHRONIC DISEASE:  -thrombocytopenia was present prior to admission due to liver disease; some consumption with blood loss intra-operatively  -monitoring with q4-6h Hgb & other labs in the immediate post-op period.    -Will use a transfusion trigger of Hgb <8, INR >2, Platelet count <20, Fibrinogen <200 in the acute post-op period     COAGULOPATHY:  -present prior to admission due to liver disease, exacerbated by surgical blood loss  -transfusion triggers as above     INADEQUATE ORAL INTAKE:  -hx RNYGB, so will need IR-placed post-pyloric feeding tube in the morning     DM2:  -insulin gtt for now     HYPOTHYROID:  -levothyroxine     MISC:  -full code  -family updated by primary team  -SQH not necessary immediately post-op; H2 blocker for steroids  -lines: leave introducers and A line for now  -galloway to remain while resuscitating  -anticipate discharge to acute rehab in >2 weeks     Billing statement: 33min of critical care time; spent in an initial review of imaging, labs, physical exam, and discussion of the patient with my own team and the extended care team including the primary service; Based on this patient's presentation / recent intervention and my bedside assessment, I felt there was or is a reasonably high probability of imminent or life-threatening deterioration today or tonight for graft-related reasons.   My overall critical care time, as described in detail above, includes such things as coordination of care, arrhythmia and hemodynamics management  with infusions of pressors , respiratory management, fluid therapy including fluid boluses, and pain and sedation therapy. This time excludes time I spent personally performing or supervising procedures for this patient.     CAM Lerma MD  Clinical   Anesthesia / Critical Care  *[phone]

## 2022-11-02 NOTE — PROGRESS NOTES
CLINICAL NUTRITION SERVICES - ASSESSMENT NOTE     Nutrition Prescription    RECOMMENDATIONS FOR MDs/PROVIDERS TO ORDER:  Patient with hx of RNYGB and malabsorption, which will put patient at high risk for inadequate oral intake d/t high assessed needs following transplant.  Would recommend supplemental TF to help meet needs.     Recommend holding individual vitamin A, vitamin D and vitamin E supplements.  Continue SAMI Plus one capsule daily when appropriate.      Malnutrition Status:    Unable to determine due to incomplete information at this time    Recommendations already ordered by Registered Dietitian (RD):  Daily weights for weight monitoring, adjustment of DW    Future/Additional Recommendations:  Tube Feeding Recommendations:  Vital 1.5 Juna @ goal of  50ml/hr  (1200ml/day) + 1 pkt Prosource TF20 will provide: 1880 kcal (30 kcal/kg), 101 g PRO (1.6g/kg), 916 ml free H20, 224 g CHO, and 7 g fiber daily.  *Due to pancreatic insufficiency, use 1 RELIZORB cartridge every 24 hours with TF rate at 10-20 ml/hr.  Change 1 RELIZORB cartridge every 12 hours with TF rates >20 ml/hr.  RN: Obtain cartridges from [employer] and/or call [phone] ([employer]) and request [employer] #[id number]    Calorie counts and oral supplements upon diet advancement    Recommend checking vitamin A, C, D, E and B12 levels in ~2 months to assess need for ongoing oral supplementation.       REASON FOR ASSESSMENT  Susan Puckett is a/an 52 year old female assessed by the dietitian for Provider Order - Pre Op Liver Transplant    NUTRITION HISTORY  Per chart review: Patient with poor appetite,nausea and fatigue/cognition issues leading to reduced oral intake for many months.  Would graze on small amounts and/or only tolerate bites of meals. Has also been dealing with either low or high BG values.  During previous RD visits, noted dislike for most protein drinks.  Tolerates protein H20 the best.     Home medications (pt reported to be taking to phamacy):  "Creon 24 - 2 with meals, 1 with snacks; calcium, vitamin B12 1000 mcg by mouth every 7 days, folic acid 1 mg daily, DEKAS plus 1 capsule daily, vitamin A 10,000 units daily, vitamin C 1000 mg daily, vitamin D 50 mcg, vitamin E 400 units daily    CURRENT NUTRITION ORDERS  Diet: NPO    LABS  Vitamin A 0.06 (low) - 1/2022  Vitamin B1 163 (normal) - 2/2022  Vitamin B12 786 (normal) - 9/2022  Vitamin B6 55 (normal) - 9/2022  Vitamin D 6 (low) - 10/28/22  Vitamin E 1.8 (low) - 2/2022  Vitamin B3 2.53 (normal) - 2/2022  Folate 19.8 (normal) - 2/2022  Fecal elastase 2.7 (low) - 7/2022 - severe pancreatic insufficiency    MEDICATIONS  Medications reviewed    ANTHROPOMETRICS  Height: 5'6\"  Most Recent Weight:  Latest weight checked 76.2 kg (9/28/2022)  IBW: 59.1 kg  BMI: Overweight BMI 25-29.9  Weight History:  Weight appears up ~20# over the last 7 months, however most recent weight was ~1 month ago.  Reported UBW of 155-160# in 2/2022.    Wt Readings from Last 15 Encounters:   09/15/22 78.2 kg (172 lb 8 oz)   07/14/22 75.9 kg (167 lb 4.8 oz)   02/20/22 69.4 kg (152 lb 14.4 oz)   01/24/22 71.9 kg (158 lb 9.6 oz)   01/13/22 81.2 kg (179 lb)   01/06/22 74.8 kg (165 lb)   12/27/21 65.4 kg (144 lb 3.2 oz)   12/13/21 71.7 kg (158 lb)   12/09/21 68.9 kg (151 lb 12.8 oz)   11/16/21 74.4 kg (164 lb)   11/06/21 74.5 kg (164 lb 4.8 oz)   11/05/21 73.5 kg (162 lb 0.6 oz)   10/05/21 78 kg (171 lb 14.4 oz)   07/07/21 71.9 kg (158 lb 9.6 oz)   05/18/21 71.3 kg (157 lb 1.6 oz)   Dosing Weight: 63 kg (adj wt based on IBW of 59.1 kg and actual wt of 76.2 kg)    ASSESSED NUTRITION NEEDS  Estimated Energy Needs: 0559-0387 kcals/day (30 - 35 kcals/kg )  Justification: Increased needs  Estimated Protein Needs:  grams protein/day (1.5 - 2 grams of pro/kg)  Justification: Increased needs  Estimated Fluid Needs: per MD    MALNUTRITION  % Intake: </=50% for >/= 1 month (severe)  % Weight Loss: Unable to assess  Subcutaneous Fat Loss: Unable to " assess  Muscle Loss: Unable to assess  Fluid Accumulation/Edema: Unable to assess  Malnutrition Diagnosis: Unable to determine due to incomplete information at this time    NUTRITION DIAGNOSIS  Inadequate oral intake related to AMS, nausea/vomiting, reduced appetite as evidenced by patient consuming <50% of usual intake for >1 month.    INTERVENTIONS  Implementation  Nutrition Education: Unable to complete due to patient in OR     Goals  Diet adv v nutrition support within 2-3 days.     Monitoring/Evaluation  Progress toward goals will be monitored and evaluated per protocol.    Bhavana Paige, MS, RD, LD, CCTD, CNSC  7A/Obs unit pager [phone]  Weekend pager [phone]

## 2022-11-03 ENCOUNTER — APPOINTMENT (OUTPATIENT)
Dept: ULTRASOUND IMAGING | Facility: CLINIC | Age: 52
DRG: 005 | End: 2022-11-03
Attending: SURGERY
Payer: COMMERCIAL

## 2022-11-03 ENCOUNTER — TELEPHONE (OUTPATIENT)
Dept: TRANSPLANT | Facility: CLINIC | Age: 52
End: 2022-11-03

## 2022-11-03 DIAGNOSIS — Z94.4 LIVER REPLACED BY TRANSPLANT (H): Primary | ICD-10-CM

## 2022-11-03 LAB
ALBUMIN SERPL BCG-MCNC: 2.9 G/DL (ref 3.5–5.2)
ALBUMIN SERPL BCG-MCNC: 3 G/DL (ref 3.5–5.2)
ALBUMIN SERPL BCG-MCNC: 3.2 G/DL (ref 3.5–5.2)
ALBUMIN SERPL BCG-MCNC: 3.2 G/DL (ref 3.5–5.2)
ALBUMIN SERPL BCG-MCNC: 3.6 G/DL (ref 3.5–5.2)
ALP SERPL-CCNC: 48 U/L (ref 35–104)
ALP SERPL-CCNC: 48 U/L (ref 35–104)
ALP SERPL-CCNC: 51 U/L (ref 35–104)
ALP SERPL-CCNC: 52 U/L (ref 35–104)
ALP SERPL-CCNC: 60 U/L (ref 35–104)
ALT SERPL W P-5'-P-CCNC: 322 U/L (ref 10–35)
ALT SERPL W P-5'-P-CCNC: 329 U/L (ref 10–35)
ALT SERPL W P-5'-P-CCNC: 371 U/L (ref 10–35)
ALT SERPL W P-5'-P-CCNC: 398 U/L (ref 10–35)
ALT SERPL W P-5'-P-CCNC: 474 U/L (ref 10–35)
AMYLASE SERPL-CCNC: 65 U/L (ref 28–100)
ANION GAP SERPL CALCULATED.3IONS-SCNC: 12 MMOL/L (ref 7–15)
ANION GAP SERPL CALCULATED.3IONS-SCNC: 12 MMOL/L (ref 7–15)
ANION GAP SERPL CALCULATED.3IONS-SCNC: 19 MMOL/L (ref 7–15)
ANION GAP SERPL CALCULATED.3IONS-SCNC: 9 MMOL/L (ref 7–15)
ANION GAP SERPL CALCULATED.3IONS-SCNC: 9 MMOL/L (ref 7–15)
AST SERPL W P-5'-P-CCNC: 320 U/L (ref 10–35)
AST SERPL W P-5'-P-CCNC: 347 U/L (ref 10–35)
AST SERPL W P-5'-P-CCNC: 479 U/L (ref 10–35)
AST SERPL W P-5'-P-CCNC: 654 U/L (ref 10–35)
AST SERPL W P-5'-P-CCNC: 883 U/L (ref 10–35)
BASOPHILS # BLD AUTO: 0 10E3/UL (ref 0–0.2)
BASOPHILS NFR BLD AUTO: 0 %
BILIRUB DIRECT SERPL-MCNC: 0.65 MG/DL (ref 0–0.3)
BILIRUB DIRECT SERPL-MCNC: 1.81 MG/DL (ref 0–0.3)
BILIRUB SERPL-MCNC: 1 MG/DL
BILIRUB SERPL-MCNC: 1 MG/DL
BILIRUB SERPL-MCNC: 1.2 MG/DL
BILIRUB SERPL-MCNC: 2.1 MG/DL
BILIRUB SERPL-MCNC: 2.6 MG/DL
BUN SERPL-MCNC: 12.4 MG/DL (ref 6–20)
BUN SERPL-MCNC: 15.2 MG/DL (ref 6–20)
BUN SERPL-MCNC: 17.1 MG/DL (ref 6–20)
BUN SERPL-MCNC: 18.4 MG/DL (ref 6–20)
BUN SERPL-MCNC: 22.6 MG/DL (ref 6–20)
CA-I BLD-MCNC: 4.7 MG/DL (ref 4.4–5.2)
CALCIUM SERPL-MCNC: 8.3 MG/DL (ref 8.6–10)
CALCIUM SERPL-MCNC: 8.4 MG/DL (ref 8.6–10)
CALCIUM SERPL-MCNC: 8.7 MG/DL (ref 8.6–10)
CALCIUM SERPL-MCNC: 8.8 MG/DL (ref 8.6–10)
CALCIUM SERPL-MCNC: 9.5 MG/DL (ref 8.6–10)
CHLORIDE SERPL-SCNC: 104 MMOL/L (ref 98–107)
CHLORIDE SERPL-SCNC: 105 MMOL/L (ref 98–107)
CHLORIDE SERPL-SCNC: 105 MMOL/L (ref 98–107)
CHLORIDE SERPL-SCNC: 107 MMOL/L (ref 98–107)
CHLORIDE SERPL-SCNC: 107 MMOL/L (ref 98–107)
CREAT SERPL-MCNC: 0.75 MG/DL (ref 0.51–0.95)
CREAT SERPL-MCNC: 0.85 MG/DL (ref 0.51–0.95)
CREAT SERPL-MCNC: 0.87 MG/DL (ref 0.51–0.95)
CREAT SERPL-MCNC: 0.94 MG/DL (ref 0.51–0.95)
CREAT SERPL-MCNC: 1.02 MG/DL (ref 0.51–0.95)
DEPRECATED HCO3 PLAS-SCNC: 20 MMOL/L (ref 22–29)
DEPRECATED HCO3 PLAS-SCNC: 24 MMOL/L (ref 22–29)
DEPRECATED HCO3 PLAS-SCNC: 25 MMOL/L (ref 22–29)
EOSINOPHIL # BLD AUTO: 0 10E3/UL (ref 0–0.7)
EOSINOPHIL NFR BLD AUTO: 0 %
ERYTHROCYTE [DISTWIDTH] IN BLOOD BY AUTOMATED COUNT: 16.9 % (ref 10–15)
ERYTHROCYTE [DISTWIDTH] IN BLOOD BY AUTOMATED COUNT: 17.3 % (ref 10–15)
ERYTHROCYTE [DISTWIDTH] IN BLOOD BY AUTOMATED COUNT: 17.5 % (ref 10–15)
ERYTHROCYTE [DISTWIDTH] IN BLOOD BY AUTOMATED COUNT: 17.6 % (ref 10–15)
ERYTHROCYTE [DISTWIDTH] IN BLOOD BY AUTOMATED COUNT: 17.6 % (ref 10–15)
ERYTHROCYTE [DISTWIDTH] IN BLOOD BY AUTOMATED COUNT: 17.8 % (ref 10–15)
FIBRINOGEN PPP-MCNC: 197 MG/DL (ref 170–490)
FIBRINOGEN PPP-MCNC: 218 MG/DL (ref 170–490)
FIBRINOGEN PPP-MCNC: 232 MG/DL (ref 170–490)
FIBRINOGEN PPP-MCNC: 248 MG/DL (ref 170–490)
GFR SERPL CREATININE-BSD FRML MDRD: 67 ML/MIN/1.73M2
GFR SERPL CREATININE-BSD FRML MDRD: 74 ML/MIN/1.73M2
GFR SERPL CREATININE-BSD FRML MDRD: 81 ML/MIN/1.73M2
GFR SERPL CREATININE-BSD FRML MDRD: 83 ML/MIN/1.73M2
GFR SERPL CREATININE-BSD FRML MDRD: >90 ML/MIN/1.73M2
GLUCOSE BLDC GLUCOMTR-MCNC: 100 MG/DL (ref 70–99)
GLUCOSE BLDC GLUCOMTR-MCNC: 103 MG/DL (ref 70–99)
GLUCOSE BLDC GLUCOMTR-MCNC: 105 MG/DL (ref 70–99)
GLUCOSE BLDC GLUCOMTR-MCNC: 105 MG/DL (ref 70–99)
GLUCOSE BLDC GLUCOMTR-MCNC: 107 MG/DL (ref 70–99)
GLUCOSE BLDC GLUCOMTR-MCNC: 109 MG/DL (ref 70–99)
GLUCOSE BLDC GLUCOMTR-MCNC: 113 MG/DL (ref 70–99)
GLUCOSE BLDC GLUCOMTR-MCNC: 114 MG/DL (ref 70–99)
GLUCOSE BLDC GLUCOMTR-MCNC: 116 MG/DL (ref 70–99)
GLUCOSE BLDC GLUCOMTR-MCNC: 122 MG/DL (ref 70–99)
GLUCOSE BLDC GLUCOMTR-MCNC: 143 MG/DL (ref 70–99)
GLUCOSE BLDC GLUCOMTR-MCNC: 160 MG/DL (ref 70–99)
GLUCOSE BLDC GLUCOMTR-MCNC: 171 MG/DL (ref 70–99)
GLUCOSE BLDC GLUCOMTR-MCNC: 201 MG/DL (ref 70–99)
GLUCOSE BLDC GLUCOMTR-MCNC: 220 MG/DL (ref 70–99)
GLUCOSE BLDC GLUCOMTR-MCNC: 237 MG/DL (ref 70–99)
GLUCOSE BLDC GLUCOMTR-MCNC: 261 MG/DL (ref 70–99)
GLUCOSE BLDC GLUCOMTR-MCNC: 271 MG/DL (ref 70–99)
GLUCOSE BLDC GLUCOMTR-MCNC: 95 MG/DL (ref 70–99)
GLUCOSE SERPL-MCNC: 122 MG/DL (ref 70–99)
GLUCOSE SERPL-MCNC: 126 MG/DL (ref 70–99)
GLUCOSE SERPL-MCNC: 149 MG/DL (ref 70–99)
GLUCOSE SERPL-MCNC: 227 MG/DL (ref 70–99)
GLUCOSE SERPL-MCNC: 300 MG/DL (ref 70–99)
HBA1C MFR BLD: 5.7 %
HCT VFR BLD AUTO: 25.8 % (ref 35–47)
HCT VFR BLD AUTO: 26.9 % (ref 35–47)
HCT VFR BLD AUTO: 28.1 % (ref 35–47)
HCT VFR BLD AUTO: 29.2 % (ref 35–47)
HGB BLD-MCNC: 8.6 G/DL (ref 11.7–15.7)
HGB BLD-MCNC: 9.2 G/DL (ref 11.7–15.7)
HGB BLD-MCNC: 9.3 G/DL (ref 11.7–15.7)
HGB BLD-MCNC: 9.3 G/DL (ref 11.7–15.7)
HGB BLD-MCNC: 9.6 G/DL (ref 11.7–15.7)
HGB BLD-MCNC: 9.8 G/DL (ref 11.7–15.7)
IMM GRANULOCYTES # BLD: 0 10E3/UL
IMM GRANULOCYTES # BLD: 0.1 10E3/UL
IMM GRANULOCYTES NFR BLD: 0 %
IMM GRANULOCYTES NFR BLD: 1 %
INR PPP: 1.33 (ref 0.85–1.15)
INR PPP: 1.43 (ref 0.85–1.15)
INR PPP: 1.58 (ref 0.85–1.15)
LACTATE SERPL-SCNC: 1 MMOL/L (ref 0.7–2)
LIPASE SERPL-CCNC: 6 U/L (ref 13–60)
LYMPHOCYTES # BLD AUTO: 0.3 10E3/UL (ref 0.8–5.3)
LYMPHOCYTES # BLD AUTO: 0.5 10E3/UL (ref 0.8–5.3)
LYMPHOCYTES NFR BLD AUTO: 10 %
LYMPHOCYTES NFR BLD AUTO: 6 %
LYMPHOCYTES NFR BLD AUTO: 6 %
LYMPHOCYTES NFR BLD AUTO: 7 %
LYMPHOCYTES NFR BLD AUTO: 7 %
MAGNESIUM SERPL-MCNC: 2 MG/DL (ref 1.7–2.3)
MCH RBC QN AUTO: 31.3 PG (ref 26.5–33)
MCH RBC QN AUTO: 31.4 PG (ref 26.5–33)
MCH RBC QN AUTO: 31.6 PG (ref 26.5–33)
MCH RBC QN AUTO: 31.7 PG (ref 26.5–33)
MCHC RBC AUTO-ENTMCNC: 33.3 G/DL (ref 31.5–36.5)
MCHC RBC AUTO-ENTMCNC: 33.6 G/DL (ref 31.5–36.5)
MCHC RBC AUTO-ENTMCNC: 34.2 G/DL (ref 31.5–36.5)
MCHC RBC AUTO-ENTMCNC: 34.2 G/DL (ref 31.5–36.5)
MCHC RBC AUTO-ENTMCNC: 34.6 G/DL (ref 31.5–36.5)
MCHC RBC AUTO-ENTMCNC: 34.6 G/DL (ref 31.5–36.5)
MCV RBC AUTO: 92 FL (ref 78–100)
MCV RBC AUTO: 94 FL (ref 78–100)
MCV RBC AUTO: 94 FL (ref 78–100)
MONOCYTES # BLD AUTO: 0.1 10E3/UL (ref 0–1.3)
MONOCYTES # BLD AUTO: 0.2 10E3/UL (ref 0–1.3)
MONOCYTES # BLD AUTO: 0.4 10E3/UL (ref 0–1.3)
MONOCYTES NFR BLD AUTO: 2 %
MONOCYTES NFR BLD AUTO: 3 %
MONOCYTES NFR BLD AUTO: 9 %
NEUTROPHILS # BLD AUTO: 4.1 10E3/UL (ref 1.6–8.3)
NEUTROPHILS # BLD AUTO: 4.2 10E3/UL (ref 1.6–8.3)
NEUTROPHILS # BLD AUTO: 6.2 10E3/UL (ref 1.6–8.3)
NEUTROPHILS # BLD AUTO: 7.1 10E3/UL (ref 1.6–8.3)
NEUTROPHILS # BLD AUTO: 8 10E3/UL (ref 1.6–8.3)
NEUTROPHILS NFR BLD AUTO: 81 %
NEUTROPHILS NFR BLD AUTO: 90 %
NEUTROPHILS NFR BLD AUTO: 90 %
NEUTROPHILS NFR BLD AUTO: 91 %
NEUTROPHILS NFR BLD AUTO: 91 %
NRBC # BLD AUTO: 0 10E3/UL
NRBC BLD AUTO-RTO: 0 /100
PHOSPHATE SERPL-MCNC: 4.6 MG/DL (ref 2.5–4.5)
PLAT MORPH BLD: NORMAL
PLATELET # BLD AUTO: 100 10E3/UL (ref 150–450)
PLATELET # BLD AUTO: 105 10E3/UL (ref 150–450)
PLATELET # BLD AUTO: 105 10E3/UL (ref 150–450)
PLATELET # BLD AUTO: 110 10E3/UL (ref 150–450)
PLATELET # BLD AUTO: 147 10E3/UL (ref 150–450)
PLATELET # BLD AUTO: 83 10E3/UL (ref 150–450)
POTASSIUM SERPL-SCNC: 3.5 MMOL/L (ref 3.4–5.3)
POTASSIUM SERPL-SCNC: 3.9 MMOL/L (ref 3.4–5.3)
POTASSIUM SERPL-SCNC: 4 MMOL/L (ref 3.4–5.3)
POTASSIUM SERPL-SCNC: 4.2 MMOL/L (ref 3.4–5.3)
POTASSIUM SERPL-SCNC: 4.2 MMOL/L (ref 3.4–5.3)
POTASSIUM SERPL-SCNC: 4.3 MMOL/L (ref 3.4–5.3)
PROT SERPL-MCNC: 4.7 G/DL (ref 6.4–8.3)
PROT SERPL-MCNC: 4.8 G/DL (ref 6.4–8.3)
PROT SERPL-MCNC: 4.9 G/DL (ref 6.4–8.3)
PROT SERPL-MCNC: 4.9 G/DL (ref 6.4–8.3)
PROT SERPL-MCNC: 5.2 G/DL (ref 6.4–8.3)
RBC # BLD AUTO: 2.75 10E6/UL (ref 3.8–5.2)
RBC # BLD AUTO: 2.93 10E6/UL (ref 3.8–5.2)
RBC # BLD AUTO: 2.93 10E6/UL (ref 3.8–5.2)
RBC # BLD AUTO: 2.94 10E6/UL (ref 3.8–5.2)
RBC # BLD AUTO: 3.04 10E6/UL (ref 3.8–5.2)
RBC # BLD AUTO: 3.1 10E6/UL (ref 3.8–5.2)
RBC MORPH BLD: NORMAL
SODIUM SERPL-SCNC: 139 MMOL/L (ref 136–145)
SODIUM SERPL-SCNC: 140 MMOL/L (ref 136–145)
SODIUM SERPL-SCNC: 140 MMOL/L (ref 136–145)
SODIUM SERPL-SCNC: 143 MMOL/L (ref 136–145)
SODIUM SERPL-SCNC: 144 MMOL/L (ref 136–145)
WBC # BLD AUTO: 4.7 10E3/UL (ref 4–11)
WBC # BLD AUTO: 5.1 10E3/UL (ref 4–11)
WBC # BLD AUTO: 6.5 10E3/UL (ref 4–11)
WBC # BLD AUTO: 6.9 10E3/UL (ref 4–11)
WBC # BLD AUTO: 7.9 10E3/UL (ref 4–11)
WBC # BLD AUTO: 8.7 10E3/UL (ref 4–11)

## 2022-11-03 PROCEDURE — 250N000013 HC RX MED GY IP 250 OP 250 PS 637: Performed by: ANESTHESIOLOGY

## 2022-11-03 PROCEDURE — 85384 FIBRINOGEN ACTIVITY: CPT

## 2022-11-03 PROCEDURE — 999N000015 HC STATISTIC ARTERIAL MONITORING DAILY

## 2022-11-03 PROCEDURE — 82962 GLUCOSE BLOOD TEST: CPT

## 2022-11-03 PROCEDURE — P9047 ALBUMIN (HUMAN), 25%, 50ML: HCPCS | Performed by: ANESTHESIOLOGY

## 2022-11-03 PROCEDURE — 250N000013 HC RX MED GY IP 250 OP 250 PS 637: Performed by: SURGERY

## 2022-11-03 PROCEDURE — 99207 PR SATISFY VISIT NUMBER: CPT | Performed by: TRANSPLANT SURGERY

## 2022-11-03 PROCEDURE — 258N000003 HC RX IP 258 OP 636

## 2022-11-03 PROCEDURE — 85610 PROTHROMBIN TIME: CPT

## 2022-11-03 PROCEDURE — 250N000011 HC RX IP 250 OP 636: Performed by: PHYSICIAN ASSISTANT

## 2022-11-03 PROCEDURE — 97530 THERAPEUTIC ACTIVITIES: CPT | Mod: GP | Performed by: PHYSICAL THERAPIST

## 2022-11-03 PROCEDURE — 93975 VASCULAR STUDY: CPT | Mod: 26 | Performed by: RADIOLOGY

## 2022-11-03 PROCEDURE — 85025 COMPLETE CBC W/AUTO DIFF WBC: CPT

## 2022-11-03 PROCEDURE — 93975 VASCULAR STUDY: CPT

## 2022-11-03 PROCEDURE — 36556 INSERT NON-TUNNEL CV CATH: CPT | Mod: GC | Performed by: ANESTHESIOLOGY

## 2022-11-03 PROCEDURE — 250N000011 HC RX IP 250 OP 636: Performed by: TRANSPLANT SURGERY

## 2022-11-03 PROCEDURE — 250N000011 HC RX IP 250 OP 636: Performed by: SURGERY

## 2022-11-03 PROCEDURE — 200N000002 HC R&B ICU UMMC

## 2022-11-03 PROCEDURE — 99233 SBSQ HOSP IP/OBS HIGH 50: CPT | Mod: 24 | Performed by: ANESTHESIOLOGY

## 2022-11-03 PROCEDURE — 250N000013 HC RX MED GY IP 250 OP 250 PS 637: Performed by: STUDENT IN AN ORGANIZED HEALTH CARE EDUCATION/TRAINING PROGRAM

## 2022-11-03 PROCEDURE — 97161 PT EVAL LOW COMPLEX 20 MIN: CPT | Mod: GP | Performed by: PHYSICAL THERAPIST

## 2022-11-03 PROCEDURE — 05HY32Z INSERTION OF MONITORING DEVICE INTO UPPER VEIN, PERCUTANEOUS APPROACH: ICD-10-PCS | Performed by: ANESTHESIOLOGY

## 2022-11-03 PROCEDURE — 85384 FIBRINOGEN ACTIVITY: CPT | Performed by: STUDENT IN AN ORGANIZED HEALTH CARE EDUCATION/TRAINING PROGRAM

## 2022-11-03 PROCEDURE — 82330 ASSAY OF CALCIUM: CPT

## 2022-11-03 PROCEDURE — 83735 ASSAY OF MAGNESIUM: CPT

## 2022-11-03 PROCEDURE — 250N000011 HC RX IP 250 OP 636: Performed by: ANESTHESIOLOGY

## 2022-11-03 PROCEDURE — 82150 ASSAY OF AMYLASE: CPT

## 2022-11-03 PROCEDURE — 84100 ASSAY OF PHOSPHORUS: CPT

## 2022-11-03 PROCEDURE — 84450 TRANSFERASE (AST) (SGOT): CPT

## 2022-11-03 PROCEDURE — 250N000012 HC RX MED GY IP 250 OP 636 PS 637: Performed by: SURGERY

## 2022-11-03 PROCEDURE — 83605 ASSAY OF LACTIC ACID: CPT

## 2022-11-03 PROCEDURE — 258N000003 HC RX IP 258 OP 636: Performed by: SURGERY

## 2022-11-03 PROCEDURE — 83690 ASSAY OF LIPASE: CPT

## 2022-11-03 PROCEDURE — 250N000009 HC RX 250: Performed by: PHYSICIAN ASSISTANT

## 2022-11-03 PROCEDURE — 80053 COMPREHEN METABOLIC PANEL: CPT

## 2022-11-03 PROCEDURE — 85041 AUTOMATED RBC COUNT: CPT | Performed by: NURSE PRACTITIONER

## 2022-11-03 PROCEDURE — 82248 BILIRUBIN DIRECT: CPT

## 2022-11-03 PROCEDURE — 250N000013 HC RX MED GY IP 250 OP 250 PS 637: Performed by: NURSE PRACTITIONER

## 2022-11-03 PROCEDURE — 250N000013 HC RX MED GY IP 250 OP 250 PS 637: Performed by: PHYSICIAN ASSISTANT

## 2022-11-03 RX ORDER — ASPIRIN 81 MG/1
81 TABLET, CHEWABLE ORAL DAILY
Status: DISCONTINUED | OUTPATIENT
Start: 2022-11-03 | End: 2022-11-10 | Stop reason: HOSPADM

## 2022-11-03 RX ORDER — ESCITALOPRAM OXALATE 10 MG/1
10 TABLET ORAL DAILY
Status: DISCONTINUED | OUTPATIENT
Start: 2022-11-03 | End: 2022-11-10 | Stop reason: HOSPADM

## 2022-11-03 RX ORDER — NYSTATIN 100000/ML
500000 SUSPENSION, ORAL (FINAL DOSE FORM) ORAL 4 TIMES DAILY
Status: DISCONTINUED | OUTPATIENT
Start: 2022-11-03 | End: 2022-11-10

## 2022-11-03 RX ORDER — HYDROXYZINE HYDROCHLORIDE 25 MG/1
25 TABLET, FILM COATED ORAL EVERY 6 HOURS PRN
Status: DISCONTINUED | OUTPATIENT
Start: 2022-11-03 | End: 2022-11-10 | Stop reason: HOSPADM

## 2022-11-03 RX ORDER — TRAZODONE HYDROCHLORIDE 50 MG/1
50 TABLET, FILM COATED ORAL AT BEDTIME
Status: DISCONTINUED | OUTPATIENT
Start: 2022-11-03 | End: 2022-11-10 | Stop reason: HOSPADM

## 2022-11-03 RX ORDER — ALBUMIN (HUMAN) 12.5 G/50ML
12.5 SOLUTION INTRAVENOUS ONCE
Status: COMPLETED | OUTPATIENT
Start: 2022-11-03 | End: 2022-11-03

## 2022-11-03 RX ORDER — HYDROXYZINE HYDROCHLORIDE 25 MG/1
50 TABLET, FILM COATED ORAL EVERY 6 HOURS PRN
Status: DISCONTINUED | OUTPATIENT
Start: 2022-11-03 | End: 2022-11-10 | Stop reason: HOSPADM

## 2022-11-03 RX ORDER — TACROLIMUS 1 MG/1
4 CAPSULE ORAL
Status: DISCONTINUED | OUTPATIENT
Start: 2022-11-03 | End: 2022-11-05

## 2022-11-03 RX ORDER — LEVOTHYROXINE SODIUM 75 UG/1
150 TABLET ORAL
Status: DISCONTINUED | OUTPATIENT
Start: 2022-11-03 | End: 2022-11-10 | Stop reason: HOSPADM

## 2022-11-03 RX ORDER — OXYCODONE HYDROCHLORIDE 5 MG/1
5 TABLET ORAL EVERY 4 HOURS PRN
Status: DISCONTINUED | OUTPATIENT
Start: 2022-11-03 | End: 2022-11-10 | Stop reason: HOSPADM

## 2022-11-03 RX ORDER — SENNOSIDES 8.6 MG
2 TABLET ORAL 2 TIMES DAILY
Status: DISCONTINUED | OUTPATIENT
Start: 2022-11-03 | End: 2022-11-10 | Stop reason: HOSPADM

## 2022-11-03 RX ORDER — TOPIRAMATE 50 MG/1
50 TABLET, FILM COATED ORAL DAILY
Status: DISCONTINUED | OUTPATIENT
Start: 2022-11-03 | End: 2022-11-10 | Stop reason: HOSPADM

## 2022-11-03 RX ORDER — SULFAMETHOXAZOLE AND TRIMETHOPRIM 400; 80 MG/1; MG/1
1 TABLET ORAL DAILY
Status: DISCONTINUED | OUTPATIENT
Start: 2022-11-03 | End: 2022-11-10 | Stop reason: HOSPADM

## 2022-11-03 RX ORDER — MAGNESIUM SULFATE HEPTAHYDRATE 40 MG/ML
2 INJECTION, SOLUTION INTRAVENOUS ONCE
Status: COMPLETED | OUTPATIENT
Start: 2022-11-03 | End: 2022-11-03

## 2022-11-03 RX ADMIN — SULFAMETHOXAZOLE AND TRIMETHOPRIM 1 TABLET: 400; 80 TABLET ORAL at 08:17

## 2022-11-03 RX ADMIN — MYCOPHENOLATE MOFETIL 750 MG: 200 POWDER, FOR SUSPENSION ORAL at 08:16

## 2022-11-03 RX ADMIN — PANTOPRAZOLE SODIUM 40 MG: 40 TABLET, DELAYED RELEASE ORAL at 08:16

## 2022-11-03 RX ADMIN — MYCOPHENOLATE MOFETIL 750 MG: 250 CAPSULE ORAL at 18:14

## 2022-11-03 RX ADMIN — HUMAN INSULIN 1 UNITS/HR: 100 INJECTION, SOLUTION SUBCUTANEOUS at 12:03

## 2022-11-03 RX ADMIN — MAGNESIUM SULFATE IN WATER 2 G: 40 INJECTION, SOLUTION INTRAVENOUS at 06:41

## 2022-11-03 RX ADMIN — NYSTATIN 500000 UNITS: 100000 SUSPENSION ORAL at 18:14

## 2022-11-03 RX ADMIN — OXYCODONE HYDROCHLORIDE 10 MG: 5 TABLET ORAL at 02:20

## 2022-11-03 RX ADMIN — TRAZODONE HYDROCHLORIDE 50 MG: 50 TABLET ORAL at 22:08

## 2022-11-03 RX ADMIN — OXYCODONE HYDROCHLORIDE 5 MG: 5 TABLET ORAL at 18:19

## 2022-11-03 RX ADMIN — SODIUM CHLORIDE, POTASSIUM CHLORIDE, SODIUM LACTATE AND CALCIUM CHLORIDE: 600; 310; 30; 20 INJECTION, SOLUTION INTRAVENOUS at 09:48

## 2022-11-03 RX ADMIN — TOPIRAMATE 50 MG: 50 TABLET ORAL at 08:17

## 2022-11-03 RX ADMIN — PIPERACILLIN AND TAZOBACTAM 3.38 G: 3; .375 INJECTION, POWDER, LYOPHILIZED, FOR SOLUTION INTRAVENOUS at 14:14

## 2022-11-03 RX ADMIN — LEVOTHYROXINE SODIUM 150 MCG: 0.05 TABLET ORAL at 08:16

## 2022-11-03 RX ADMIN — PIPERACILLIN AND TAZOBACTAM 3.38 G: 3; .375 INJECTION, POWDER, LYOPHILIZED, FOR SOLUTION INTRAVENOUS at 02:27

## 2022-11-03 RX ADMIN — ALBUMIN HUMAN 12.5 G: 0.25 SOLUTION INTRAVENOUS at 14:14

## 2022-11-03 RX ADMIN — ASPIRIN 81 MG CHEWABLE TABLET 81 MG: 81 TABLET CHEWABLE at 14:14

## 2022-11-03 RX ADMIN — SENNOSIDES 2 TABLET: 8.6 TABLET, FILM COATED ORAL at 08:17

## 2022-11-03 RX ADMIN — NYSTATIN 500000 UNITS: 100000 SUSPENSION ORAL at 19:59

## 2022-11-03 RX ADMIN — POLYETHYLENE GLYCOL 3350 17 G: 17 POWDER, FOR SOLUTION ORAL at 08:16

## 2022-11-03 RX ADMIN — TACROLIMUS 4 MG: 1 CAPSULE ORAL at 18:14

## 2022-11-03 RX ADMIN — FLUCONAZOLE 400 MG: 400 INJECTION, SOLUTION INTRAVENOUS at 08:18

## 2022-11-03 RX ADMIN — ESCITALOPRAM OXALATE 10 MG: 10 TABLET ORAL at 08:17

## 2022-11-03 RX ADMIN — TACROLIMUS 4 MG: 5 CAPSULE ORAL at 09:29

## 2022-11-03 RX ADMIN — VALGANCICLOVIR HYDROCHLORIDE 450 MG: 50 POWDER, FOR SOLUTION ORAL at 08:16

## 2022-11-03 RX ADMIN — HYDROMORPHONE HYDROCHLORIDE 0.5 MG: 1 INJECTION, SOLUTION INTRAMUSCULAR; INTRAVENOUS; SUBCUTANEOUS at 12:03

## 2022-11-03 RX ADMIN — PIPERACILLIN AND TAZOBACTAM 3.38 G: 3; .375 INJECTION, POWDER, LYOPHILIZED, FOR SOLUTION INTRAVENOUS at 21:00

## 2022-11-03 RX ADMIN — SODIUM CHLORIDE 200 MG: 9 INJECTION, SOLUTION INTRAVENOUS at 08:14

## 2022-11-03 RX ADMIN — HYDROXYZINE HYDROCHLORIDE 25 MG: 25 TABLET, FILM COATED ORAL at 15:04

## 2022-11-03 RX ADMIN — PIPERACILLIN AND TAZOBACTAM 3.38 G: 3; .375 INJECTION, POWDER, LYOPHILIZED, FOR SOLUTION INTRAVENOUS at 08:17

## 2022-11-03 RX ADMIN — SENNOSIDES 2 TABLET: 8.6 TABLET, FILM COATED ORAL at 19:59

## 2022-11-03 ASSESSMENT — ACTIVITIES OF DAILY LIVING (ADL)
ADLS_ACUITY_SCORE: 35
ADLS_ACUITY_SCORE: 36
ADLS_ACUITY_SCORE: 35
ADLS_ACUITY_SCORE: 36
ADLS_ACUITY_SCORE: 36

## 2022-11-03 NOTE — PROGRESS NOTES
CLINICAL NUTRITION SERVICES - BRIEF NOTE   (See RD note on 11/2 for full assessment)     Reason for RD note: Post op DDLT consult for TF assess and order    New Findings/Chart Review:  POD 1 DDLT  Extubated  Per transplant team, no feeding tube planned  Remains NPO    Future/Additional Recommendations:  Follow for diet advancement with ONS / calorie counts and PTA creon  Post transplant diet education as able    Nutrition will continue to follow per protocol.    Amy Singh RD, LD, CNSC  4A SICU RD pager: [phone]  Ascom: [phone]

## 2022-11-03 NOTE — PROGRESS NOTES
STAFF NOTE:  Received a few blood products overnight  Now off pressors     Breathing pattern better than yesterday  Still somewhat somnolent, but follows commands and answers questions.  CAM-ICU positive  JPs more ascitic than bloody  R internal jugular with hands-free;  JPs normal degree of serosanguinous output  Rossi somewhat concentrated     Labs notable for transaminases that have not changed as much as expected  Rest of lytes and renal functionm are fine  CBC remarkable only for mild thrombocytopenia  coags are fine after some transfusion     Hepatic doppler continues to show elevated ressistive indices     This is a 52F hx CUETO cirrhosis, s/p TIPS with history of recurrent pleural effusions and a variety of other cirrhosis-related complications; RNYGB; GERD, DVT, DM2, seizures, hypothyroid, HSV, depression.  She was COVID positive, but tested positive again pre-op.      She can transfer to the floor today after we exchange her MAC introducer for a 3 lumen, as per our typical protocol.  Will discuss with transplant surgery whetehr they want to start aspirin for elevated resistive indices.     DEPRESSION:   -escitalopram     ENCEPHALOPATHY / DELIRIUM:  -resolving  -melatonin for sleep; no need for sedation at this time      ACUTE ON CHRONIC LIVER FAILURE S/P TRANSPLANT:  -CUETO cirrhosis as underlying cause of liver failure  -induction immunosuppression with methylprednisolone/prednisone taper through POD #5.  Will eventually continue prednisone at 5 mg PO daily until tacrolimus level is therapeutic  -maintenance immunosuppression to include mycophenolate and tacrolimus with anticipated goal trough levels of 8-12 (to be determined by surgical service) mcg/L for 0-3 months post-transplant.    -Surgical prophylaxis includes: piperacillin-tazobactam IV for 48 hours and diflucan  -Opportunistic pathogen prophylaxis includes: trimethoprim/sulfamethoxazole, valganciclovir, and topical antifungal coverage to begin once  systemic antifungal therapy is complete.  -oral and IV narcotics available  -will discuss starting aspirin with transplant surgery for elevated resistive indices.  Also should try to decrease congestion in the liver with 25% albumin or maybe some hypertonic saline as long as renal function is great.     THROMBOCYTOPENIA AND ACUTE BLOOD LOSS ANEMIA ON ANEMIA OF CHRONIC DISEASE:  -thrombocytopenia was present prior to admission due to liver disease; some consumption with blood loss intra-operatively  -coagulopathy appears to be corrected; can back off on frequencly of lab checks     COAGULOPATHY:  -present prior to admission due to liver disease, exacerbated by surgical blood loss     INADEQUATE ORAL INTAKE:  -transplant service says they are ok with not placing post-pyloric feeding tube for supplementary feeds     DM2:  -insulin gtt for now     HYPOTHYROID:  -levothyroxine     MISC:  -full code  -family updated by primary team  -SQH not necessary immediately post-op; H2 blocker for steroids  -lines: leave introducers and A line for now  -galloway to remain while resuscitating  -anticipate discharge to acute rehab in >2 weeks     Billing statement: 35min of E&M time.     CAM Lerma MD  Clinical   Anesthesia / Critical Care  *[phone]

## 2022-11-03 NOTE — PROGRESS NOTES
11/03/22 1100   Appointment Info   Signing Clinician's Name / Credentials (PT) roshni hill,pt   Living Environment   People in Home spouse   Current Living Arrangements house   Home Accessibility stairs to enter home   Number of Stairs, Main Entrance 2   Stair Railings, Main Entrance none   Number of Stairs, Within Home, Primary other (see comments);greater than 10 stairs  (stairs to 2nd floor and basement, no need to access)   Transportation Anticipated car, drives self;family or friend will provide   Living Environment Comments spouse currently in hospital for surgery, states she has family/friends who will help them post surgery   Self-Care   Usual Activity Tolerance moderate   Current Activity Tolerance fair   Regular Exercise No   Equipment Currently Used at Home cane, straight   Fall history within last six months no   Activity/Exercise/Self-Care Comment mod IND for household and short community distances, uses SPC for community ambulation, IND with ADLs   General Information   Onset of Illness/Injury or Date of Surgery 11/01/22   Referring Physician Paulie Miranda MD   Patient/Family Therapy Goals Statement (PT) return home   Pertinent History of Current Problem (include personal factors and/or comorbidities that impact the POC) Susan Puckett is a 52 year old female with a past medical history significant for obesity s/p RNY gastric bypass surgery (2006), GERD, line associated upper extremity DVT (2/2022), anemia, diabetes, history of diabetic seizure, hypothyroidism, HSV, depressive disorder, anxiety, migraines, and 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 Her cirrhosis has been c/b ascites, HE, EV, and hepatic hydrothorax s/p frequent thoracentesis. Patient now presents is POD1 for DDLT and admitted to SICU post-op for further management.   Existing Precautions/Restrictions abdominal   Cognition   Orientation Status (Cognition) oriented x 3   Follows Commands (Cognition) WFL   Pain Assessment   Patient  Currently in Pain Yes, see Vital Sign flowsheet   Posture    Posture Forward head position;Protracted shoulders   Range of Motion (ROM)   ROM Comment B LE grossly WFL   Strength (Manual Muscle Testing)   Strength Comments demonstrated antigravity strength B LE   Bed Mobility   Comment, (Bed Mobility) supine > sit mod assist   Transfers   Comment, (Transfers) sit > stand min assist x 2   Balance   Balance Comments required UE support to maintain standing balance   Sensory Examination   Sensory Perception Comments light touch/proprioception intact B LE   Clinical Impression   Criteria for Skilled Therapeutic Intervention Yes, treatment indicated   PT Diagnosis (PT) impaired functional mobility s/p liver transplant   Influenced by the following impairments pain, impaired balance, decreased activity tolerance   Functional limitations due to impairments impaired bed mobility, impaired transfers, impaired gait   Clinical Presentation (PT Evaluation Complexity) Stable/Uncomplicated   Clinical Presentation Rationale clinical judgement   Clinical Decision Making (Complexity) low complexity   Planned Therapy Interventions (PT) balance training;bed mobility training;gait training;stair training;transfer training;progressive activity/exercise   Risk & Benefits of therapy have been explained evaluation/treatment results reviewed;care plan/treatment goals reviewed   PT Total Evaluation Time   PT Eval, Low Complexity Minutes (35127) 8   Plan of Care Review   Plan of Care Reviewed With patient   Physical Therapy Goals   PT Frequency 6x/week   PT Predicted Duration/Target Date for Goal Attainment 11/17/22   PT Goals Bed Mobility;Transfers;Gait;Stairs   PT: Bed Mobility Independent;Supine to/from sit;Within precautions   PT: Transfers Independent;Sit to/from stand   PT: Gait Modified independent;Greater than 200 feet;Straight cane   PT: Stairs Supervision/stand-by assist;2 stairs;Assistive device  (SPC, no rail)   PT Discharge  Planning   PT Plan supine > sit, initiate gait   PT Discharge Recommendation (DC Rec) home with assist   PT Rationale for DC Rec patient mobilizing well, based on PLOF anticipate patient will be mod IND with functional mobility for household distances during expected length of acute stay   PT Brief overview of current status supine > sit mod assist, sit > stand and bed > chair with min assist x 2.   Total Session Time   Total Session Time (sum of timed and untimed services) 8

## 2022-11-03 NOTE — PLAN OF CARE
Major Shift Events:  Remains lethargic, intermittently follows simple commands, opens eyes to voice, cryo given for fibrinogen <200. Remains on insulin drip algorithm 3.   Plan: Continue to monitor and notify team as needed  For vital signs and complete assessments, please see documentation flowsheets.

## 2022-11-03 NOTE — PHARMACY-TRANSPLANT NOTE
Adult Liver Transplant Post Operative Note    52 year old female s/p  donor liver transplant on 2022 for nonalcoholic fatty liver disease s/p TIPS.    Planned immunosuppression regimen to include:   INDUCTION with: methylprednisolone/prednisone taper through POD #6.  MAINTENANCE to include mycophenolate and tacrolimus with initial goal trough levels of 8-12 (closer to 10) mcg/L for 0-3 months post-transplant.  Patient may continue prednisone at 5 mg PO daily until tacrolimus level is therapeutic.     Immunosuppression plan:   --1000 mg IV after reperfusion 200 mg POD#1 100 mg POD#2 50 mg POD#3 25 mg POD#4 10 mg POD#5 5 mg POD#6 then continue at this dose until able to start and achieve therapeutic FK levels.  --Tacrolimus with goal 8-12.   -- mg BID    Surgical prophylaxis includes: piperacillin-tazobactam IV for 48 hours and fluconazole IV for 24 hours.      Opportunistic pathogen prophylaxis includes: trimethoprim/sulfamethoxazole, valganciclovir, and nystatin (topical antifungal coverage to begin once systemic antifungal therapy is complete).    Patient is not enrolled in medication study.    Pharmacy will monitor for medication interactions and immunosuppression levels in conjunction with the team. Medication therapy needs for discharge planning will continue to be addressed throughout the current admission via multidisciplinary rounds and order review.  Pharmacy will make recommendations as appropriate.    Venessa Duran, IngrisD

## 2022-11-03 NOTE — PROGRESS NOTES
Transplant Surgery  Inpatient Daily Progress Note  11/03/2022    Assessment & Plan: Susan Puckett is a 52 year old female with PMH significant obesity s/p Faustino-en-Y, DM, HTN, CUETO cirrhosis c/b HE, EV, Hepatic hydrothorax.   She is now s/p OLTx 11/2 with Dr. Delbert Morgan.    Graft function:Liver transplant: POD # 1. Liver transaminases trending down, TB 1, LA 1, INR 1.33.      Graft patency ppx: ASA 81 daily  Liver US: POD #0, Patent doppler, Elevated RI=1 in HA  Repeat Imaging POD #1, unchanged elevated RI in HA    Immunosuppression management:  Steroid taper induction  Maintenance:  Tac 4mg BID (goal 8-12)  MMF 750mg BID  Complexity of management:High. Contributing factors: induction  Hematology:   Acute blood loss/Anemia of chronic disease: Hgb 9.6, trend   Thrombocytopenia: due to liver disease, Plts 100  Coagulopathy: due to liver disease, INR 1.33. 1U cryo overnight on 11/3  Neurology: Acute postoperative pain:  Continue Oxycodone and dilaudid IV PRN.  Hx of hypoglycemic seizures: continue topamax  Depression: Continue lexapro, Trazadone  Cardiorespiratory:   Postop ventilatory support: Extubated to 2L NC. Encourage CDB/IS  GI/Nutrition: NPO, RD following  Hx of Gastric bypass:  Pancreatic insufficiency: on Creon PTA.  Hold while NPO  Continue bowel regimen senna and miralax  Endocrine: Steroid induced hyperglycemia/DM: on Januvia and insulin PTA, continue insulin gtt.  monitor while on Steroid taper.   Hypothyroid:continue synthroid  Fluid/Electrolytes: MIVF:LR@100.  Hypoalbuminemia: Albumin 2.9  Electrolytes per ICU  : Rossi in place due to critically ill for close monitoring of I/Os. Plan to remove POD #2  Infectious disease:Afebrile, WBC normal  Covid + 11/1: Cycle threshold =39.7, covid recovered     Prophylaxis: DVT(SCDs, GI (PPI), fungal (diflucan x1, nystatin), PCP ppx (bactrim), CMV ppx (valcyte)   Periop ppx zosyn x48hrs,   Disposition: SICU-transfer to , PT/OT    Medical Decision Making:  High  Subsequent visit [id number] (high level decision making)     ANYA/Fellow/Resident Provider: Ange Sánchez NP     Faculty: Delbert Morgan MD    __________________________________________________________________  Transplant History: Admitted 11/1/2022 for Liver transplant .    11/2/2022 (Liver), Postoperative day: 1     Interval History: History is obtained from the patient  Overnight events: Sleepy this am. Moderate pain. Not yet OOB. C/o dry throat. Denies SOB.     ROS:   A 10-point review of systems was negative except as noted above.    Curent Meds:    escitalopram  10 mg Oral or Feeding Tube Daily     fibrinogen concentrate (Human)  1.1 g Intravenous Once     fibrinogen concentrate (Human)  1.1 g Intravenous Once     levothyroxine  150 mcg Oral or Feeding Tube QAM AC     [START ON 11/4/2022] methylPREDNISolone  100 mg Intravenous Once    Followed by     [START ON 11/5/2022] methylPREDNISolone  50 mg Intravenous Once    Followed by     [START ON 11/6/2022] predniSONE  25 mg Oral Once    Followed by     [START ON 11/7/2022] predniSONE  10 mg Oral Once     mycophenolate  750 mg Oral BID IS    Or     mycophenolate  750 mg Oral or NG Tube BID IS     pantoprazole  40 mg Oral Daily    Or     pantoprazole  40 mg Oral or NG Tube Daily     piperacillin-tazobactam  3.375 g Intravenous Q6H     polyethylene glycol  17 g Oral Daily     sennosides  2 tablet Oral or Feeding Tube BID     sodium chloride (PF)  3 mL Intravenous Q8H     sulfamethoxazole-trimethoprim  1 tablet Oral or Feeding Tube Daily     tacrolimus  4 mg Oral BID IS    Or     tacrolimus  4 mg Oral or NG Tube BID IS     topiramate  50 mg Oral or Feeding Tube Daily     traZODone  50 mg Oral or Feeding Tube At Bedtime     valGANciclovir  450 mg Oral Daily    Or     valGANciclovir  450 mg Oral or NG Tube Daily       Physical Exam:     Admit Weight: 79.9 kg (176 lb 1.6 oz)    Current Vitals:   /66   Pulse 66   Temp 99.1  F (37.3  C) (Axillary)   Resp 16    " 1.676 m (5' 6\")   Wt 79.8 kg (175 lb 14.8 oz)   SpO2 97%   BMI 28.40 kg/m           Vital sign ranges:    Temp:  [98.5  F (36.9  C)-99.8  F (37.7  C)] 99.1  F (37.3  C)  Pulse:  [] 66  Resp:  [0-23] 16  BP: (114)/(66-67) 114/66  MAP:  [60 mmHg-111 mmHg] 109 mmHg  Arterial Line BP: ()/() 123/100  SpO2:  [96 %-99 %] 97 %  Patient Vitals for the past 24 hrs:   BP Temp Temp src Pulse Resp SpO2 Height Weight   11/03/22 1000 114/66 -- -- 66 -- 97 % -- --   11/03/22 0900 -- -- -- 82 -- 96 % -- --   11/03/22 0800 -- -- -- 64 -- 98 % -- --   11/03/22 0700 -- -- -- 71 -- 97 % -- --   11/03/22 0500 -- -- -- 67 -- 98 % -- --   11/03/22 0400 -- 99.1  F (37.3  C) Axillary 70 16 98 % -- 79.8 kg (175 lb 14.8 oz)   11/03/22 0300 -- -- -- 71 -- 98 % -- --   11/03/22 0230 -- -- -- 72 16 98 % -- --   11/03/22 0216 (P) 125/64 (P) 98.5  F (36.9  C) (P) Oral -- -- -- -- --   11/03/22 0200 -- -- -- 73 15 99 % -- --   11/03/22 0100 -- -- -- 75 13 98 % -- --   11/03/22 0015 -- -- -- 89 13 98 % -- --   11/03/22 0000 -- 99.3  F (37.4  C) Axillary 87 13 97 % -- --   11/02/22 2345 -- -- -- 93 14 97 % -- --   11/02/22 2330 -- -- -- 92 13 96 % -- --   11/02/22 2315 -- 99.3  F (37.4  C) Axillary 93 16 97 % -- --   11/02/22 2300 114/67 99.8  F (37.7  C) Axillary 98 18 96 % -- --   11/02/22 2245 -- -- -- 98 18 96 % -- --   11/02/22 2230 -- -- -- 98 12 98 % -- --   11/02/22 2215 -- -- -- 97 -- 98 % -- --   11/02/22 2200 -- -- -- 96 12 97 % -- --   11/02/22 2145 -- -- -- 95 19 98 % -- --   11/02/22 2130 -- -- -- 96 20 98 % -- --   11/02/22 2115 -- -- -- 98 18 98 % -- --   11/02/22 2100 -- -- -- 96 14 98 % -- --   11/02/22 2045 -- -- -- 98 19 98 % -- --   11/02/22 2030 -- -- -- 101 18 98 % -- --   11/02/22 2015 -- -- -- 101 18 98 % -- --   11/02/22 2000 -- 98.8  F (37.1  C) Axillary 101 18 98 % -- --   11/02/22 1900 -- -- -- 105 20 98 % -- --   11/02/22 1845 -- -- -- 107 -- 98 % -- --   11/02/22 1830 -- -- -- 107 19 98 % -- -- " "  11/02/22 1815 -- -- -- 104 13 98 % -- --   11/02/22 1800 -- -- -- 105 12 97 % -- --   11/02/22 1745 -- -- -- 111 (!) 0 97 % -- --   11/02/22 1730 -- -- -- 99 21 98 % -- --   11/02/22 1715 -- -- -- 98 21 97 % -- --   11/02/22 1700 -- -- -- 97 (!) 0 98 % -- --   11/02/22 1652 -- -- -- -- -- 97 % -- --   11/02/22 1645 -- -- -- 96 22 97 % -- --   11/02/22 1630 -- -- -- 99 23 97 % 1.676 m (5' 6\") 79.9 kg (176 lb 1.6 oz)   11/02/22 1615 -- -- -- 97 22 97 % -- --   11/02/22 1600 -- 98.5  F (36.9  C) Axillary -- 20 -- -- --     General Appearance: in no apparent distress. Sleepy  Skin: normal, warm, dry, No rashes, induration, or jaundice  Heart: regular rate and rhythm,   Lungs:shallow respirations, NLB on 2L  Abdomen: soft, tender to palpation, incision with staples CDI. TERESITA x2 to Right abdomen with serosanguinous output.  : Rossi present with green output  (methylene blue)  Extremities: edema: present bilateral. 1+.  Neurologic: Sleepy, arouseable, SONIDO independently. Tremor absent.. Asterixis: absent.    Frailty Scores     Frailty Scores 1/6/2022 10/18/2020    Final Score Frail Not Frail    Final Score Number 3 2          Data:   CMP  Recent Labs   Lab 11/03/22  1019 11/03/22  0850 11/03/22  0452 11/03/22  0358 11/02/22  1626 11/02/22  1434 11/02/22  0955 11/01/22 2038   NA  --  140  --  143   < > 142   < > 149*   POTASSIUM  --  4.3  --  3.9   < > 2.9*   < > 4.1   CHLORIDE  --  107  --  107   < >  --   --  117*   CO2  --  24  --  24   < >  --   --  17*   * 126*   < > 149*   < > 263*   < > 130*   BUN  --  18.4  --  17.1   < >  --   --  9.9   CR  --  0.94  --  0.87   < >  --   --  0.93   GFRESTIMATED  --  74  --  81   < >  --   --  75   MAURI  --  8.4*  --  8.8   < >  --   --  8.6   ICAW  --   --   --  4.7  --  5.3*   < >  --    MAG  --   --   --  2.0  --   --   --  1.7   PHOS  --   --   --  4.6*  --   --   --  3.3   AMYLASE  --   --   --  65  --   --   --  45   LIPASE  --   --   --  6*  --   --   --   --  "   ALBUMIN  --  2.9*  --  3.2*   < >  --   --  2.7*   BILITOTAL  --  1.0  --  1.2   < >  --   --  1.5*   ALKPHOS  --  48  --  48   < >  --   --  86   AST  --  347*  --  479*   < >  --   --  46*   ALT  --  329*  --  371*   < >  --   --  7*    < > = values in this interval not displayed.     CBC  Recent Labs   Lab 11/03/22  0850 11/03/22  0358 11/02/22  1625 11/02/22  1450   HGB 9.3* 9.3*   < > 9.8*   WBC 7.9 6.9   < >  --    * 110*   < > 96*   A1C  --   --   --  5.7*    < > = values in this interval not displayed.     COAGS  Recent Labs   Lab 11/03/22  0850 11/03/22  0358 11/02/22  1815 11/02/22  1625 11/02/22  1450   INR 1.33* 1.43*   < > 1.94* 2.79*   PTT  --   --   --  40* 65*    < > = values in this interval not displayed.      Urinalysis  Recent Labs   Lab Test 11/01/22  2133 07/09/22  1506   COLOR Yellow Straw   APPEARANCE Clear Clear   URINEGLC Negative >=1000*   URINEBILI Negative Negative   URINEKETONE Negative Negative   SG 1.015 1.011   UBLD Negative Negative   URINEPH 7.0 5.0   PROTEIN Negative Negative   NITRITE Negative Negative   LEUKEST Negative Negative   RBCU 1 0   WBCU 1 0     Virology:  Hepatitis C Antibody   Date Value Ref Range Status   11/01/2022 Nonreactive Nonreactive Final

## 2022-11-03 NOTE — PROGRESS NOTES
[facility]    ICU Progress Note       Date of Admission:  11/1/2022    Assessment: Critical Care   ASSESSMENT:  Susan Puckett is a 52 year old female with a past medical history significant for obesity s/p RNY gastric bypass surgery (2006), GERD, line associated upper extremity DVT (2/2022), anemia, diabetes, history of diabetic seizure, hypothyroidism, HSV, depressive disorder, anxiety, migraines, and 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 Her cirrhosis has been c/b ascites, HE, EV, and hepatic hydrothorax s/p frequent thoracentesis. Patient now presents is POD1 for DDLT and admitted to SICU post-op for further management.      Intra-operatively, EBL @ 1400cc. She was given 6.3L plasmalyte, 1.1L CellSaver, 5U pRBCs, 5U FFP, and 2U plts.      CHANGES and MAJOR THINGS TODAY:   - remove arterial line  - repeat fibrinogen this afternoon  - repeat Liver Doppler this AM  - transition to nasal cannula      PLAN:     Neurological:  # Acute postoperative pain   # Depression/Anxiety   # Hx of hepatic encephalopathy   # Hx of hypoglycemic seizures   - Monitor neurological status. Delirium prevention and precautions.   - Pain: dilaudid PRN, oxycodone PRN. Hold PTA pregabalin 25mg BID   - Resume PTA Escitalopram 10mg, PTA Rizatriptan 10mg PRN, Topiramate 50mg daily, and Trazadone 50mg at bedtime       Pulmonary:  # Acute Hypoxic Respiratory Failure (resolving)  # refractory hepatic hydrothorax s/p multiple thoracenteses  - transition from oxymask to NC  - incentive spirometer Q1H while awake and more awake    - Supplemental oxygen to keep saturation above 92 %     Cardiovascular:    # Hyperlipidemia  # Shock likely hemorrhagic (resolved)  - EBL @ 1400cc. She was given 6.3L plasmalyte, 1.1L CellSaver, 5U pRBCs, 5U FFP, and 2U plts  - not on pressors  - Monitor hemodynamic status. MAP > 65  - hold PTA Atorvastatin  - remove arterial line     GI/Nutrition:    # ESLD s/p DDLT 11/2   # Hx of  CUETO cirrhosis s/p TIPS 11/2021   # Hx of RNY gastric bypass in 2006   # At risk for protein calorie deficit malnutrition  # Hx of GERD   # Pancreatic Insufficiency   # Post-transplant transaminitis (resolving)  Orders Placed This Encounter      NPO per Anesthesia Guidelines for Procedure/Surgery Except for: Meds  - NPO, ok for sips + ice chips when more awake  - Resume PTA Creon PRN  - Hold PTA Januvia 100mg  - Liver doppler on 11/3 with elevated resistive indices in the hepatic arteries  - hepatic function panel Q24H     Fluids/Electrolytes:  # Hypokalemia  # Hx of Vit D Deficiency  # Lactic Acidosis (resolved)  - transitioned to LR  for IV fluid hydration.   - discontinue lactate Q4H  - electrolyte replacement protocol  In place.   - hold PTA Vit D3 2000U daily     Renal:   - has indwelling galloway in place  - Urine output  3.2L . Will continue to monitor intake and output.     Intake/Output Summary (Last 24 hours) at 11/3/2022 0747  Last data filed at 11/3/2022 0600  Gross per 24 hour   Intake 82503.33 ml   Output 5476 ml   Net 7183.33 ml      Endocrine:  # Hypothyroidism   # Stress hyperglycemia   # Diabetes Mellitus (MR A1c of 6.2 11/1/22)  - PTA medications: Synthroid 150mcg   - insulin gtt; Goal to keep BG < 180 for optimal wound healing   - will keep on insulin gtt for now   - Continue PTA levothyroxine               Recent Labs   Lab 11/02/22  1146 11/02/22  1110 11/02/22  1029 11/02/22  0955 11/01/22  2038   * 131* 134* 125* 130*      Infectious disease/Immunosuppression:   # COVID positive (asymptomatic); Recovered   # Need for long term infection prophylaxis   - Antibiotics:  - Zosyn x 48 hours  - Prophylaxis:   - Received 3 doses of covid vaccination (last 9/2021)  - Bactrim to start 11/3/22  - Valcyte to start on 11/3/22  - Diflucan x1 dose on 11/3/22  # Immunosuppresion  - Mycophenolate 750mg BID  - methylprednisone f/b prednisone (starting on POD4)  - Tacrolimus 2mg BID     Hematology:    # Hx  "of KARL   # Hx of coagulopathy  # Hx of pancytopenia (improving)  # hypofibrinogenemia (improving)  # Acute blood loss anemia   # Hx of superficial thrombus (02/2021)   # Hx of line ass'd UE DVT (2/2022)  - EBL @ 1400cc. She was given 6.3L plasmalyte, 1.1L CellSaver, 5U pRBCs, 5U FFP, and 2U plts intra-operatively  - 1U cryo overnight on 11/3  - Hemoglobin 9.3. Threshold for transfusion if hgb <8.0 or signs/symptoms of hypoperfusion.     - goal INR < 2 (hold off on additional transfusion unless pt is on pressors), plt goal > 20K, fibrinogen > 200  - CBC Q4H, will space these out if they continue to be relatively unchanged  - Hold PTA ferrous gluconate     Musculoskeletal:  # Weakness and deconditioning of critical illness   - Physical and occupational therapy consults once extubated     Skin:  - diligent cares to prevent skin breakdown and wound formation.       General Cares/Prophylaxis:    DVT Prophylaxis: SCDs  GI Prophylaxis: PPI  Restraints: N/A     Lines/ tubes/ drains:  - R radial A-line, ETT, Rossi, PIVx2    Disposition:  - ICU, continued post-op mgmt    The patient's care was discussed with the attending on service, Dr. Lerma.     Shani Barnes MD   Gen Surg, PGY-1      # Overweight: Estimated body mass index is 28.4 kg/m  as calculated from the following:    Height as of this encounter: 1.676 m (5' 6\").    Weight as of this encounter: 79.8 kg (175 lb 14.8 oz).    _________________________________________________________________    Interval History   NAEO. Pt got oxycodone 10mg x2 overnight for reported pain. Additionally, pt had an OGT placed by nursing for administration of immunosuppressive medications. No reported BM overnight.     Physical Exam   Vital Signs: Temp: 99.1  F (37.3  C) Temp src: Axillary BP: (P) 125/64 Pulse: 67   Resp: 16 SpO2: 98 % O2 Device: Simple face mask Oxygen Delivery: 3 LPM  Weight: 175 lbs 14.83 oz    GENERAL: lethargic, NAD, opens eyes to sternal rub  HEENT: no scleral " icterus  CARDIO: normal rate and regular rhythm  PULM: clear to auscultation on anterior auscultation  ABD: non-distended, soft, no grimmacing to palpation; two R abd TERESITA drain with serosang output  INCISION: upper abd incision covered with island dressing. Drain site dressing w/ serosang discharge  : galloway catheter in place with green urine (got methylene blue intra-op on 11/2)  EXTREMITIES: +1 pitting edema in b/l LE    Data   I reviewed all medications, new labs and imaging results over the last 24 hours.  Arterial Blood Gases   Recent Labs   Lab 11/02/22  2339 11/02/22  1723 11/02/22  1434 11/02/22  1419   PH 7.43 7.32* 7.32* 7.31*   PCO2 39 42 38 38   PO2 108* 101 136* 136*   HCO3 26 21 20* 19*     Complete Blood Count   Recent Labs   Lab 11/03/22  0358 11/02/22 2344 11/02/22 2023 11/02/22 1815   WBC 6.9 4.7 4.0 4.0   HGB 9.3* 9.2* 9.3* 9.5*   * 105* 112* 114*     Basic Metabolic Panel  Recent Labs   Lab 11/03/22  0653 11/03/22  0629 11/03/22  0539 11/03/22  0452 11/03/22  0358 11/02/22  2350 11/02/22  2344 11/02/22 2125 11/02/22 2023 11/02/22  1816 11/02/22  1815   NA  --   --   --   --  143  --  140  --  142  --  142   POTASSIUM  --   --   --   --  3.9  --  4.2  4.2  --  3.0*  --  3.1*   CHLORIDE  --   --   --   --  107  --  104  --  105  --  105   CO2  --   --   --   --  24  --  24  --  23  --  20*   BUN  --   --   --   --  17.1  --  15.2  --  13.1  --  12.5   CR  --   --   --   --  0.87  --  0.85  --  0.77  --  0.71   GLC 95 100* 107* 116* 149*   < > 300*   < > 237*   < > 252*    < > = values in this interval not displayed.     Liver Function Tests  Recent Labs   Lab 11/03/22  0358 11/02/22  2344 11/02/22 2023 11/02/22  1815 11/02/22  1205 11/01/22 2038   * 654* 856*  --   --  46*   * 398* 437*  --   --  7*   ALKPHOS 48 52 50  --   --  86   BILITOTAL 1.2 2.1* 2.2*  --   --  1.5*   ALBUMIN 3.2* 3.2* 3.4*  --   --  2.7*   INR 1.43* 1.58* 1.71* 1.88*   < >  --     < > = values in  this interval not displayed.     Pancreatic Enzymes  Recent Labs   Lab 11/03/22  0358 11/01/22  2038   LIPASE 6*  --    AMYLASE 65 45     Coagulation Profile  Recent Labs   Lab 11/03/22  0358 11/02/22  2344 11/02/22 2023 11/02/22  1815 11/02/22  1625 11/02/22  1450 11/02/22  1322 11/02/22  1205   INR 1.43* 1.58* 1.71* 1.88* 1.94* 2.79* 2.75* 2.37*   PTT  --   --   --   --  40* 65* 83* 46*     IMAGING:  Recent Results (from the past 24 hour(s))   US Liver Transplant    Narrative    EXAMINATION: US LIVER TRANSPLANT, 11/2/2022 5:17 PM     COMPARISON: Ultrasound, 7/9/2022    HISTORY: Status post liver transplant    TECHNIQUE:  Grey-scale, color Doppler and spectral flow analysis.    FINDINGS:  Liver: Normal echogenicity and echotexture of the transplanted liver.  No peritransplant fluid collection.  Gallbladder is surgically absent.  Pancreas: Not visualized on this exam.  Right kidney: 10.3 cm. Normal echogenicity and thickness of the  parenchyma. No hydronephrosis or focal mass. No shadowing stones.  Left kidney: Not seen on this exam.  Spleen: Measures 14.4 cm. Mildly enlarged.  LIVER DOPPLER:  Splenic vein:  Patent continuous normal antegrade direction flow  towards the liver, 31 cm/sec.  Extrahepatic portal vein:  Patent continuous antegrade flow, 51  cm/sec.  Portal vein at anastomosis: Patent continuous antegrade flow, 73  cm/sec.  Intrahepatic portal vein:  Patent continuous antegrade flow, 77  cm/sec.  Right portal vein flow is antegrade, measuring 87 cm/sec.  Left portal vein flow is antegrade, measuring 59 cm/sec.    Inferior vena cava patent with flow toward the heart throughout..  IVC above anastomosis:  122 cm/sec.  IVC at anastomosis:  41 cm/sec.  Intrahepatic IVC:  103 cm/sec.  Extrahepatic IVC:  40 cm/sec.    Right, mid, left hepatic veins: Patent with flow towards the inferior  vena cava.    Extrahepatic hepatic artery: Low resistance waveform with flow towards  the liver. 106 cm/sec with resistive  index 0.86.  Right hepatic artery: 22 cm/sec with resistive index 1.0. High  resistance waveform.  Left hepatic artery: 45 cm/sec with resistive index 0.71.    Visualized portions of the aorta are unremarkable.      Impression    Impression:   1.  Normal echotexture and echogenicity of the liver with no  peritransplant fluid collection.  2.  High resistance waveform in the right hepatic artery with  resistive index of 1.0. Mildly increased resistive index (0.86) in the  extrahepatic hepatic artery. Likely secondary to postsurgical edema.  Attention on follow-up.    I have personally reviewed the examination and initial interpretation  and I agree with the findings.    CELESTE CANNON MD         SYSTEM ID:  [id number]   XR Chest Port 1 View    Narrative    EXAM: XR CHEST PORT 1 VIEW  11/2/2022 5:46 PM     HISTORY:  eval central line placement       COMPARISON:  Chest radiograph 11/1/2022    FINDINGS:   Portable AP supine view of the chest. Right IJ central venous catheter  terminating in the high SVC. Midline trachea. Cardiomegaly and  indistinct pulmonary vasculature. No pneumothorax or pleural effusion.  Increased bilateral perihilar opacities, left greater than right.  Fluid in the right minor fissure. Visualized upper abdomen is  unremarkable. No acute osseous abnormality.      Impression    IMPRESSION:     1. Right IJ central venous catheter terminating in the high SVC. The  2. Cardiomegaly, indistinct pulmonary vasculature, increased bilateral  perihilar opacities, left greater than right. Right minor fissure  fluid. Findings are likely representing pulmonary edema with or  without superimposed atelectasis.    I have personally reviewed the examination and initial interpretation  and I agree with the findings.    CELESTE CANNON MD         SYSTEM ID:  [id number]   XR Abdomen Port 1 View    Narrative    Exam: XR ABDOMEN PORT 1 VIEW, 11/2/2022 6:16 PM    Indication: NG placement    Comparison: 1/27/22    Findings:    Single portable supine view the abdomen is obtained. Nasogastric tube  tip and sidehole projected over the stomach. Nonobstructive bowel gas  pattern. Surgical changes of liver transplant.      Impression    Impression:   1. Nasogastric tube tip and sidehole over the stomach.  2. Nonobstructive bowel gas pattern.    I have personally reviewed the examination and initial interpretation  and I agree with the findings.    CHRISTOS DONG MD         SYSTEM ID:  [id number]

## 2022-11-03 NOTE — PROCEDURES
CENTRAL LINE INSERTION PROCEDURE NOTE  (NON-OR)    Procedure Date: 11/3/2022   Performing Physician: Shani Barnes MD    Procedure:  Insertion of Central Venous Catheter  Right   INTERNAL  JUGULAR    Indications:  POST-OP MONITORING       Estimated Blood Loss:  MINIMAL   Complications: NONE     Findings:  N/A    Procedure Details:   Procedure was done as an emergency : No  The risks, benefits, complications, treatment options, and expected outcomes were discussed with PATIENT .  The risks and potential complications of their problem and purposed procedure include but are not limited to infection, bleeding, pain,  lung puncture, the need for additional procedures, creating a complication requiring transfusion or operation, and nerve and vessel injury.  The patient/alternate (see above) concurred with the proposed plan, giving informed consent.  The site of the procedure was properly noted/marked. The patient was identified as Susan Puckett with Date of Birth 1970 and the procedure verified as Insertion of Central Venous Catheter.  A Time Out was held and the above information confirmed.    Under sterile conditions the skin over the  Right   INTERNAL  JUGULAR     was prepped with Chlorhexidine and covered with a sterile drape.  Strict sterile conditions were maintained,  Cap, mask, and sterile gloves were worn by all participants.  5 ml of Lidocaine 1% local anesthetic was infiltrated into the skin and subcutaneous tissues. Using Sledinger technique an 18-gauge needle was then inserted into the  MAC.  A guide wire was then passed easily through the catheter.  There were no arrythmias . The catheter was then withdrawn. A 7.0 [place] tripple lumen  was then inserted into the vessel over the guide wire.  All ports were flushed with sterile solution and had good non-pulsatile blood return.  The catheter was sutured into place and an occlusive sterile dressing applied.  The patient tolerated the procedure well  with no change in vital signs.     Number of attempts:  1      Condition: stable  ____________________________________________________________________  Shani Barnes MD  11/3/90980:04 PM

## 2022-11-03 NOTE — PROGRESS NOTES
Transplant Social Work Services Progress Note      Date of Initial Social Work Evaluation: 10/19/2022  Collaborated with: Patient at bedside    Data: I met with patient at bedside to provide supportive counseling. She reflected on her transplant journey and has had an immense amount of emotions. She is grateful for her donor and showed me a picture of him at her bedside. I inquired how she obtained the picture and she reported that his mother came to visit today. She enjoyed meeting her. Susan has a lot of support and asked me to provide support group and Dr. Sancho Gaines with an update. She would like this writer to visit as much as I can throughout her hospitalization   Intervention: Supportive listening.   Assessment: No new assessment at this time   Education provided by SW: No new education   Plan:    Discharge Plans in Progress: TBD    Barriers to d/c plan: Medical stability     Follow up Plan: This writer will follow for ongoing psychosocial support.     RENARD Alvarado, [facility]  Liver Transplant   [facility]  Phone: [phone]  Pager: [phone]

## 2022-11-04 ENCOUNTER — APPOINTMENT (OUTPATIENT)
Dept: OCCUPATIONAL THERAPY | Facility: CLINIC | Age: 52
DRG: 005 | End: 2022-11-04
Attending: SURGERY
Payer: COMMERCIAL

## 2022-11-04 ENCOUNTER — APPOINTMENT (OUTPATIENT)
Dept: ULTRASOUND IMAGING | Facility: CLINIC | Age: 52
DRG: 005 | End: 2022-11-04
Attending: ANESTHESIOLOGY
Payer: COMMERCIAL

## 2022-11-04 PROBLEM — N17.0 ACUTE KIDNEY FAILURE WITH TUBULAR NECROSIS (H): Status: ACTIVE | Noted: 2022-11-04

## 2022-11-04 LAB
ALBUMIN SERPL BCG-MCNC: 3 G/DL (ref 3.5–5.2)
ALP SERPL-CCNC: 39 U/L (ref 35–104)
ALT SERPL W P-5'-P-CCNC: 245 U/L (ref 10–35)
ANION GAP SERPL CALCULATED.3IONS-SCNC: 12 MMOL/L (ref 7–15)
AST SERPL W P-5'-P-CCNC: 186 U/L (ref 10–35)
BACTERIA SPEC CULT: NORMAL
BASOPHILS # BLD AUTO: 0 10E3/UL (ref 0–0.2)
BASOPHILS NFR BLD AUTO: 0 %
BILIRUB DIRECT SERPL-MCNC: 0.29 MG/DL (ref 0–0.3)
BILIRUB SERPL-MCNC: 0.6 MG/DL
BUN SERPL-MCNC: 38.2 MG/DL (ref 6–20)
CALCIUM SERPL-MCNC: 7.9 MG/DL (ref 8.6–10)
CHLORIDE SERPL-SCNC: 106 MMOL/L (ref 98–107)
CREAT SERPL-MCNC: 1.52 MG/DL (ref 0.51–0.95)
DEPRECATED HCO3 PLAS-SCNC: 23 MMOL/L (ref 22–29)
EOSINOPHIL # BLD AUTO: 0 10E3/UL (ref 0–0.7)
EOSINOPHIL NFR BLD AUTO: 0 %
ERYTHROCYTE [DISTWIDTH] IN BLOOD BY AUTOMATED COUNT: 17.5 % (ref 10–15)
GFR SERPL CREATININE-BSD FRML MDRD: 41 ML/MIN/1.73M2
GLUCOSE BLDC GLUCOMTR-MCNC: 102 MG/DL (ref 70–99)
GLUCOSE BLDC GLUCOMTR-MCNC: 118 MG/DL (ref 70–99)
GLUCOSE BLDC GLUCOMTR-MCNC: 120 MG/DL (ref 70–99)
GLUCOSE BLDC GLUCOMTR-MCNC: 120 MG/DL (ref 70–99)
GLUCOSE BLDC GLUCOMTR-MCNC: 226 MG/DL (ref 70–99)
GLUCOSE BLDC GLUCOMTR-MCNC: 279 MG/DL (ref 70–99)
GLUCOSE BLDC GLUCOMTR-MCNC: 84 MG/DL (ref 70–99)
GLUCOSE BLDC GLUCOMTR-MCNC: 94 MG/DL (ref 70–99)
GLUCOSE BLDC GLUCOMTR-MCNC: 98 MG/DL (ref 70–99)
GLUCOSE SERPL-MCNC: 105 MG/DL (ref 70–99)
HBV DNA SERPL QL NAA+PROBE: NORMAL
HCT VFR BLD AUTO: 25.2 % (ref 35–47)
HCV RNA SERPL QL NAA+PROBE: NORMAL
HGB BLD-MCNC: 8.5 G/DL (ref 11.7–15.7)
HIV1+2 RNA SERPL QL NAA+PROBE: NORMAL
IMM GRANULOCYTES # BLD: 0 10E3/UL
IMM GRANULOCYTES NFR BLD: 1 %
INR PPP: 1.32 (ref 0.85–1.15)
LYMPHOCYTES # BLD AUTO: 0.6 10E3/UL (ref 0.8–5.3)
LYMPHOCYTES NFR BLD AUTO: 10 %
MAGNESIUM SERPL-MCNC: 2.3 MG/DL (ref 1.7–2.3)
MCH RBC QN AUTO: 32 PG (ref 26.5–33)
MCHC RBC AUTO-ENTMCNC: 33.7 G/DL (ref 31.5–36.5)
MCV RBC AUTO: 95 FL (ref 78–100)
MONOCYTES # BLD AUTO: 0.4 10E3/UL (ref 0–1.3)
MONOCYTES NFR BLD AUTO: 7 %
NEUTROPHILS # BLD AUTO: 4.6 10E3/UL (ref 1.6–8.3)
NEUTROPHILS NFR BLD AUTO: 82 %
NRBC # BLD AUTO: 0 10E3/UL
NRBC BLD AUTO-RTO: 0 /100
PHOSPHATE SERPL-MCNC: 6.8 MG/DL (ref 2.5–4.5)
PLATELET # BLD AUTO: 74 10E3/UL (ref 150–450)
POTASSIUM SERPL-SCNC: 4.1 MMOL/L (ref 3.4–5.3)
PROT SERPL-MCNC: 4.7 G/DL (ref 6.4–8.3)
RBC # BLD AUTO: 2.66 10E6/UL (ref 3.8–5.2)
SCANNED LAB RESULT: NORMAL
SODIUM SERPL-SCNC: 141 MMOL/L (ref 136–145)
WBC # BLD AUTO: 5.6 10E3/UL (ref 4–11)

## 2022-11-04 PROCEDURE — 250N000011 HC RX IP 250 OP 636: Performed by: PHYSICIAN ASSISTANT

## 2022-11-04 PROCEDURE — 250N000011 HC RX IP 250 OP 636: Performed by: NURSE PRACTITIONER

## 2022-11-04 PROCEDURE — 120N000011 HC R&B TRANSPLANT UMMC

## 2022-11-04 PROCEDURE — 250N000012 HC RX MED GY IP 250 OP 636 PS 637: Performed by: NURSE PRACTITIONER

## 2022-11-04 PROCEDURE — 250N000011 HC RX IP 250 OP 636

## 2022-11-04 PROCEDURE — 250N000013 HC RX MED GY IP 250 OP 250 PS 637: Performed by: NURSE PRACTITIONER

## 2022-11-04 PROCEDURE — 84100 ASSAY OF PHOSPHORUS: CPT | Performed by: NURSE PRACTITIONER

## 2022-11-04 PROCEDURE — 250N000013 HC RX MED GY IP 250 OP 250 PS 637: Performed by: STUDENT IN AN ORGANIZED HEALTH CARE EDUCATION/TRAINING PROGRAM

## 2022-11-04 PROCEDURE — P9047 ALBUMIN (HUMAN), 25%, 50ML: HCPCS | Performed by: PHYSICIAN ASSISTANT

## 2022-11-04 PROCEDURE — 93975 VASCULAR STUDY: CPT | Mod: 26 | Performed by: RADIOLOGY

## 2022-11-04 PROCEDURE — 93975 VASCULAR STUDY: CPT

## 2022-11-04 PROCEDURE — 83735 ASSAY OF MAGNESIUM: CPT

## 2022-11-04 PROCEDURE — 258N000003 HC RX IP 258 OP 636: Performed by: NURSE PRACTITIONER

## 2022-11-04 PROCEDURE — 82248 BILIRUBIN DIRECT: CPT | Performed by: NURSE PRACTITIONER

## 2022-11-04 PROCEDURE — 85025 COMPLETE CBC W/AUTO DIFF WBC: CPT | Performed by: NURSE PRACTITIONER

## 2022-11-04 PROCEDURE — P9045 ALBUMIN (HUMAN), 5%, 250 ML: HCPCS

## 2022-11-04 PROCEDURE — 250N000013 HC RX MED GY IP 250 OP 250 PS 637: Performed by: SURGERY

## 2022-11-04 PROCEDURE — 97530 THERAPEUTIC ACTIVITIES: CPT | Mod: GO

## 2022-11-04 PROCEDURE — 250N000012 HC RX MED GY IP 250 OP 636 PS 637: Performed by: PHYSICIAN ASSISTANT

## 2022-11-04 PROCEDURE — 250N000011 HC RX IP 250 OP 636: Performed by: SURGERY

## 2022-11-04 PROCEDURE — 97165 OT EVAL LOW COMPLEX 30 MIN: CPT | Mod: GO

## 2022-11-04 PROCEDURE — 85610 PROTHROMBIN TIME: CPT | Performed by: NURSE PRACTITIONER

## 2022-11-04 PROCEDURE — 99232 SBSQ HOSP IP/OBS MODERATE 35: CPT | Mod: 24 | Performed by: TRANSPLANT SURGERY

## 2022-11-04 PROCEDURE — 80053 COMPREHEN METABOLIC PANEL: CPT | Performed by: NURSE PRACTITIONER

## 2022-11-04 RX ORDER — ALBUMIN (HUMAN) 12.5 G/50ML
12.5 SOLUTION INTRAVENOUS 2 TIMES DAILY
Status: DISCONTINUED | OUTPATIENT
Start: 2022-11-04 | End: 2022-11-06

## 2022-11-04 RX ORDER — HYDROMORPHONE HYDROCHLORIDE 1 MG/ML
0.3 INJECTION, SOLUTION INTRAMUSCULAR; INTRAVENOUS; SUBCUTANEOUS EVERY 4 HOURS PRN
Status: DISCONTINUED | OUTPATIENT
Start: 2022-11-04 | End: 2022-11-06

## 2022-11-04 RX ORDER — FUROSEMIDE 10 MG/ML
40 INJECTION INTRAMUSCULAR; INTRAVENOUS ONCE
Status: COMPLETED | OUTPATIENT
Start: 2022-11-04 | End: 2022-11-04

## 2022-11-04 RX ORDER — VALGANCICLOVIR 450 MG/1
450 TABLET, FILM COATED ORAL EVERY OTHER DAY
Status: DISCONTINUED | OUTPATIENT
Start: 2022-11-05 | End: 2022-11-05

## 2022-11-04 RX ADMIN — SENNOSIDES 2 TABLET: 8.6 TABLET, FILM COATED ORAL at 20:00

## 2022-11-04 RX ADMIN — ASPIRIN 81 MG CHEWABLE TABLET 81 MG: 81 TABLET CHEWABLE at 09:23

## 2022-11-04 RX ADMIN — PIPERACILLIN AND TAZOBACTAM 3.38 G: 3; .375 INJECTION, POWDER, LYOPHILIZED, FOR SOLUTION INTRAVENOUS at 03:20

## 2022-11-04 RX ADMIN — LEVOTHYROXINE SODIUM 150 MCG: 0.05 TABLET ORAL at 09:24

## 2022-11-04 RX ADMIN — ALBUMIN (HUMAN) 12.5 G: 12.5 INJECTION, SOLUTION INTRAVENOUS at 03:18

## 2022-11-04 RX ADMIN — PIPERACILLIN AND TAZOBACTAM 3.38 G: 3; .375 INJECTION, POWDER, LYOPHILIZED, FOR SOLUTION INTRAVENOUS at 15:28

## 2022-11-04 RX ADMIN — POLYETHYLENE GLYCOL 3350 17 G: 17 POWDER, FOR SOLUTION ORAL at 09:22

## 2022-11-04 RX ADMIN — TRAZODONE HYDROCHLORIDE 50 MG: 50 TABLET ORAL at 22:31

## 2022-11-04 RX ADMIN — TOPIRAMATE 50 MG: 50 TABLET ORAL at 09:26

## 2022-11-04 RX ADMIN — TACROLIMUS 4 MG: 1 CAPSULE ORAL at 17:59

## 2022-11-04 RX ADMIN — ALBUMIN HUMAN 12.5 G: 0.25 SOLUTION INTRAVENOUS at 10:12

## 2022-11-04 RX ADMIN — MYCOPHENOLATE MOFETIL 750 MG: 250 CAPSULE ORAL at 09:34

## 2022-11-04 RX ADMIN — HYDROMORPHONE HYDROCHLORIDE 0.5 MG: 1 INJECTION, SOLUTION INTRAMUSCULAR; INTRAVENOUS; SUBCUTANEOUS at 00:28

## 2022-11-04 RX ADMIN — OXYCODONE HYDROCHLORIDE 5 MG: 5 TABLET ORAL at 18:45

## 2022-11-04 RX ADMIN — OXYCODONE HYDROCHLORIDE 5 MG: 5 TABLET ORAL at 10:12

## 2022-11-04 RX ADMIN — OXYCODONE HYDROCHLORIDE 5 MG: 5 TABLET ORAL at 06:15

## 2022-11-04 RX ADMIN — SULFAMETHOXAZOLE AND TRIMETHOPRIM 1 TABLET: 400; 80 TABLET ORAL at 09:27

## 2022-11-04 RX ADMIN — NYSTATIN 500000 UNITS: 100000 SUSPENSION ORAL at 09:23

## 2022-11-04 RX ADMIN — FUROSEMIDE 40 MG: 10 INJECTION, SOLUTION INTRAMUSCULAR; INTRAVENOUS at 10:12

## 2022-11-04 RX ADMIN — ESCITALOPRAM OXALATE 10 MG: 10 TABLET ORAL at 09:26

## 2022-11-04 RX ADMIN — NYSTATIN 500000 UNITS: 100000 SUSPENSION ORAL at 20:00

## 2022-11-04 RX ADMIN — MYCOPHENOLATE MOFETIL 750 MG: 250 CAPSULE ORAL at 17:59

## 2022-11-04 RX ADMIN — SODIUM CHLORIDE, POTASSIUM CHLORIDE, SODIUM LACTATE AND CALCIUM CHLORIDE: 600; 310; 30; 20 INJECTION, SOLUTION INTRAVENOUS at 09:22

## 2022-11-04 RX ADMIN — NYSTATIN 500000 UNITS: 100000 SUSPENSION ORAL at 15:28

## 2022-11-04 RX ADMIN — METHYLPREDNISOLONE SODIUM SUCCINATE 100 MG: 125 INJECTION, POWDER, FOR SOLUTION INTRAMUSCULAR; INTRAVENOUS at 09:28

## 2022-11-04 RX ADMIN — PANCRELIPASE 2 CAPSULE: 24000; 76000; 120000 CAPSULE, DELAYED RELEASE PELLETS ORAL at 18:01

## 2022-11-04 RX ADMIN — PIPERACILLIN AND TAZOBACTAM 3.38 G: 3; .375 INJECTION, POWDER, LYOPHILIZED, FOR SOLUTION INTRAVENOUS at 10:11

## 2022-11-04 RX ADMIN — INSULIN ASPART 3 UNITS: 100 INJECTION, SOLUTION INTRAVENOUS; SUBCUTANEOUS at 16:49

## 2022-11-04 RX ADMIN — PANTOPRAZOLE SODIUM 40 MG: 40 TABLET, DELAYED RELEASE ORAL at 09:22

## 2022-11-04 RX ADMIN — ALBUMIN HUMAN 12.5 G: 0.25 SOLUTION INTRAVENOUS at 20:01

## 2022-11-04 RX ADMIN — TACROLIMUS 4 MG: 1 CAPSULE ORAL at 09:34

## 2022-11-04 RX ADMIN — SENNOSIDES 2 TABLET: 8.6 TABLET, FILM COATED ORAL at 09:25

## 2022-11-04 RX ADMIN — PANCRELIPASE 2 CAPSULE: 24000; 76000; 120000 CAPSULE, DELAYED RELEASE PELLETS ORAL at 14:49

## 2022-11-04 ASSESSMENT — ACTIVITIES OF DAILY LIVING (ADL)
ADLS_ACUITY_SCORE: 35
IADL_COMMENTS: FAMILY CAN A
ADLS_ACUITY_SCORE: 35

## 2022-11-04 NOTE — PLAN OF CARE
Neuro: A&Ox4, perrl. 4-5+ strength in all extremities. Oxycodone given once for pain.  Cardiac: SB-SR, HR 50-70s w/ no noted ectopy. -130/60-80s. Afebrile.   Resp: RA-2LNC. IS to 500, reminders needed. Lungs coarse>clear. Strong, productive cough.  GI: Reg diet, minimal appetite (ate bites of fruit, oatmeal this AM). LBM pre-op, continues on bowel regimen.  : Rossi, UO low (15ml/hr), pt responded to lasix with 385ml output in 3hrs. Urine green/blue r/t hx of methylene blue.   LADs:  -R internal jugular x3  -R FA PIV  -LLQ TERESITA (#2, #1 removed by MD today) w/ scant amt of serosang drainage  Integ:  -Clamshell incision stapled & TRACEY w/ no drainage  Tests/Procedures: Bedside liver US completed  Activity: Up to chair with 2 person assist. Tolerated for 3 hrs.    Pt deemed appropriate to tx to . Report was called and questions were answered. Pt transferred to  at 1815 via pt bed. Pt red slippers, phone, phone  and glasses were sent with patient. Additional belongings were already located on 7A.   Continue to monitor and update MD with changes.

## 2022-11-04 NOTE — PROGRESS NOTES
STAFF NOTE:  Low UOP overnight  slow to commands, drowsy, but playing on her phone and no acute cardiopulmonary issues  Labs notable for a rising Cr  Transaminases continue to improve  Hepatic doppler continues to show generally elevated resistive indices, although the right hepatic artery is better than it had been since starting aspirin and hypertonic albumin (for presumptive parenchymal anasarca)     This is a 52F hx CUETO cirrhosis, s/p TIPS with history of recurrent pleural effusions and a variety of other cirrhosis-related complications; RNYGB; GERD, DVT, DM2, seizures, hypothyroid, HSV, depression.  She was COVID positive, but tested positive again pre-op.  Her GEORGETTE I suspect is related to nephrotoxic pharmaceutics and tissue anasarca.      Could consider an additional dose of concentrated albumin chasing a dose of lasix.    She is floor status, and the SICU service is only following peripherally       No billed time.   CAM Lerma MD  Clinical   Anesthesia / Critical Care  *[phone]

## 2022-11-04 NOTE — PROGRESS NOTES
11/04/22 1600   Living Environment   People in Home spouse   Current Living Arrangements house   Home Accessibility stairs to enter home   Number of Stairs, Main Entrance 2   Stair Railings, Main Entrance none   Number of Stairs, Within Home, Primary other (see comments);greater than 10 stairs  (stairs to 2nd floor and basement, no need to access)   Transportation Anticipated car, drives self;family or friend will provide   Living Environment Comments Pt with all needs met on main floor, tub shower. Pt  recently with surgery as well, will not be able to physically A/A with heavy ADLs/IADLs. Pt reports good family/friends support.   Self-Care   Usual Activity Tolerance moderate   Current Activity Tolerance fair   Regular Exercise No   Equipment Currently Used at Home cane, straight   Fall history within last six months no   Activity/Exercise/Self-Care Comment Pt reports IND with ADLs, pt showers when  is home for safety.   Instrumental Activities of Daily Living (IADL)   Previous Responsibilities laundry;meal prep;housekeeping   IADL Comments family can A   General Information   Onset of Illness/Injury or Date of Surgery 11/02/22   Referring Physician Ange Sánchez NP   Patient/Family Therapy Goal Statement (OT) to go home   Additional Occupational Profile Info/Pertinent History of Current Problem Susan Puckett is a 52 year old female with PMH significant obesity s/p Faustino-en-Y, DM, HTN, CUETO cirrhosis c/b HE, EV, Hepatic hydrothorax. She is now s/p OLTx 11/2 with Dr. Delbert Morgan   Existing Precautions/Restrictions abdominal;fall   Left Upper Extremity (Weight-bearing Status) partial weight-bearing (PWB)   Right Upper Extremity (Weight-bearing Status) partial weight-bearing (PWB)   Left Lower Extremity (Weight-bearing Status) full weight-bearing (FWB)   Right Lower Extremity (Weight-bearing Status) full weight-bearing (FWB)   Cognitive Status Examination   Orientation Status orientation to  person, place and time   Cognitive Status Comments Pt somewhat lethargic, though more alert when friends entering room. Otherwise appears intact   Visual Perception   Visual Impairment/Limitations corrective lenses for reading;corrective lenses full-time   Sensory   Sensory Quick Adds sensation intact   Posture   Posture forward head position;protracted shoulders   Range of Motion Comprehensive   General Range of Motion no range of motion deficits identified   Strength Comprehensive (MMT)   Comment, General Manual Muscle Testing (MMT) Assessment generalized weakness in BUEs   Bed Mobility   Bed Mobility sit-supine   Supine-Sit [place] (Bed Mobility) minimum assist (75% patient effort);2 person assist   Comment (Bed Mobility) log roll, increased time   Transfers   Transfers sit-stand transfer;toilet transfer;shower transfer   Sit-Stand Transfer   Sit-Stand [place] (Transfers) contact guard;2 person assist   Shower Transfer   Type (Shower Transfer) lateral   [place] Level (Shower Transfer) minimum assist (75% patient effort)   Shower Transfer Comments per clinical judgement   Toilet Transfer   Type (Toilet Transfer) stand-sit   [place] Level (Toilet Transfer) contact guard   Toilet Transfer Comments per clinical judgement   Balance   Balance Comments good seated balance, CGA for SPT   Activities of Daily Living   BADL Assessment/Intervention bathing;lower body dressing;toileting   Bathing Assessment/Intervention   [place] Level (Bathing) moderate assist (50% patient effort)   Comment, (Bathing) per clinical judgement   Lower Body Dressing Assessment/Training   [place] Level (Lower Body Dressing) maximum assist (25% patient effort)   Comment, (Lower Body Dressing) per clinical judgement   Toileting   [place] Level (Toileting) moderate assist (50% patient effort)   Comment, (Toileting) per clinical judgement   Clinical Impression   Criteria for Skilled Therapeutic Interventions Met  (OT) Yes, treatment indicated   OT Diagnosis Pt is below baseline for ADLs   OT Problem List-Impairments impacting ADL activity tolerance impaired;problems related to;balance;pain;post-surgical precautions   Assessment of Occupational Performance 3-5 Performance Deficits   Identified Performance Deficits dressing, bathing, toileting, funcitonal mobility and endurance, home management   Planned Therapy Interventions (OT) ADL retraining;IADL retraining;transfer training;bed mobility training;progressive activity/exercise   Clinical Decision Making Complexity (OT) low complexity   Anticipated Equipment Needs Upon Discharge (OT) shower chair   Risk & Benefits of therapy have been explained evaluation/treatment results reviewed;care plan/treatment goals reviewed;risks/benefits reviewed;current/potential barriers reviewed;participants voiced agreement with care plan;participants included;patient   Clinical Impression Comments Pt presents below baseline, limited by activity tolerance, pain, post surgical precautions. Pt will benefit from skilled OT to progress toward PLOF   OT Total Evaluation Time   OT Eval, Low Complexity Minutes (88920) 6   OT Goals   Therapy Frequency (OT) 5 times/wk   OT: Lower Body Dressing Modified independent;within precautions;using adaptive equipment   OT: Lower Body Bathing Modified independent;with precautions;using adaptive equipment   OT: Toilet Transfer/Toileting Independent;toilet transfer;cleaning and garment management;within precautions   OT: Home Management Modified independent;within precautions;ambulatory level;with light demand household tasks   OT Discharge Planning   OT Plan OT: cont standing tolerance, review precautions, standing <> sitting ADLs   OT Discharge Recommendation (DC Rec) home with home care occupational therapy;home with assist   OT Rationale for DC Rec Anticipate pt will progress to be able to d/c home with PRN family A for heavy ADLs. Pt is mobilizing with min A  POD 2, with good family support.   OT Brief overview of current status min A x 2 bed mobility, min A for SPT   Total Session Time   Total Session Time (sum of timed and untimed services) 6

## 2022-11-04 NOTE — PROGRESS NOTES
SPIRITUAL HEALTH SERVICES Progress Note  [facility] ([place]) 4A    I attempted follow up visit with Susan per on-call [facility] on 11/02. (x3) She declined SHS today. I will attempt to follow up with her on Monday if she is on 4A.    Alfrde Albert  Chaplain Resident  Pager [phone]      * [facility] remains available 24/7 for emergent requests/referrals, either by having the switchboard page the on-call  or by entering an ASAP/STAT consult in Epic (this will also page the on-call ). Routine Epic consults receive an initial response within 24 hours.*     15

## 2022-11-04 NOTE — PROGRESS NOTES
Transplant Surgery  Inpatient Daily Progress Note  11/04/2022    Assessment & Plan: Susan Puckett is a 52 year old female with PMH significant obesity s/p Faustino-en-Y, DM, HTN, CUETO cirrhosis c/b HE, EV, Hepatic hydrothorax. She is now s/p OLTx 11/2 with Dr. Delbert Morgan.    Graft function:  Liver transplant: POD # 2. Liver transaminases trending down, INR 1.3. TERESITA x2 in place with 642cc output, remove lateral drain on 11/4.   Graft patency ppx: ASA 81 daily  Liver US: POD #0, Patent doppler, Elevated RI=1 in HA  Repeat US POD #1, unchanged elevated RI in HA  Repeat US POD #2, continued elevated RIs, slightly improved flow in the left hepatic artery   Immunosuppression management:  Steroid taper induction  Maintenance:  Tac 4mg BID (goal 8-12)  MMF 750mg BID  Complexity of management:High. Contributing factors: induction, Flucon x2, now completed.   Hematology:   Acute blood loss/Anemia of chronic disease: Hgb stable 8-9  Thrombocytopenia: due to liver disease, Plts >70  Coagulopathy: due to liver disease, INR 1.3. 1U cryo overnight on 11/3.  Neurology:  Acute postoperative pain: Continue Oxycodone and dilaudid IV PRN.  Hx of hypoglycemic seizures: continue topamax  Depression: Continue lexapro, Trazadone  Cardiorespiratory:   Postop ventilatory support: Extubated to 2L NC. Encourage CDB/IS  GI/Nutrition: Advance to diet as tolerated.   Hx of Gastric bypass:  Pancreatic insufficiency: on Creon PTA, restart.   Continue bowel regimen senna and miralax  Endocrine:   Steroid induced hyperglycemia/DM: on Januvia and insulin PTA, started insulin gtt initially post operatively. Discontinue insulin gtt and start ssi prn.   Hypothyroid: continue synthroid   Fluid/Electrolytes:   MIVF: LR@100, discontine.  Hypoalbuminemia: Albumin 2.9.   GEORGETTE: Cr 1.5<-1. Give albumin 25% BID, lasix 40 mg IV x1 11/4 AM  Electrolytes WNL  : Rossi removed, continue strict I&Os   Infectious disease: Afebrile, WBC normal  Covid + 11/1: Cycle  threshold =39.7, covid recovered     Prophylaxis: DVT(SCDs, GI (PPI), fungal (diflucan x1, nystatin), PCP ppx (bactrim), CMV ppx (valcyte)   Periop ppx zosyn x48hrs,   Disposition: SICU-transfer to , PT/OT    Medical Decision Making: High  Subsequent visit 22851 (high level decision making)     ANYA/Fellow/Resident Provider: Amy Hansen PA-C     Faculty: Delbert Morgan MD    __________________________________________________________________  Transplant History: Admitted 11/1/2022 for Liver transplant .    11/2/2022 (Liver), Postoperative day: 2     Interval History: History is obtained from the patient  Overnight events: Sleepy this am. No complaints.     ROS:   A 10-point review of systems was negative except as noted above.    Curent Meds:    albumin human  12.5 g Intravenous BID     amylase-lipase-protease  2 capsule Oral TID w/meals     aspirin  81 mg Oral Daily     escitalopram  10 mg Oral or Feeding Tube Daily     insulin aspart  1-7 Units Subcutaneous TID AC     insulin aspart  1-5 Units Subcutaneous At Bedtime     levothyroxine  150 mcg Oral or Feeding Tube QAM AC     [START ON 11/5/2022] methylPREDNISolone  50 mg Intravenous Once    Followed by     [START ON 11/6/2022] predniSONE  25 mg Oral Once    Followed by     [START ON 11/7/2022] predniSONE  10 mg Oral Once     mycophenolate  750 mg Oral BID IS     nystatin  500,000 Units Oral 4x Daily     pantoprazole  40 mg Oral Daily     piperacillin-tazobactam  3.375 g Intravenous Q6H     polyethylene glycol  17 g Oral Daily     sennosides  2 tablet Oral or Feeding Tube BID     sodium chloride (PF)  3 mL Intravenous Q8H     sulfamethoxazole-trimethoprim  1 tablet Oral or Feeding Tube Daily     tacrolimus  4 mg Oral BID IS     topiramate  50 mg Oral or Feeding Tube Daily     traZODone  50 mg Oral or Feeding Tube At Bedtime     [START ON 11/5/2022] valGANciclovir  450 mg Oral Every Other Day       Physical Exam:     Admit Weight: 79.9 kg (176 lb 1.6 oz)    Current  "Vitals:   /80   Pulse 64   Temp 98.9  F (37.2  C) (Axillary)   Resp 16   Ht 1.676 m (5' 6\")   Wt 79.8 kg (175 lb 14.8 oz)   SpO2 96%   BMI 28.40 kg/m      Vital sign ranges:    Temp:  [98.5  F (36.9  C)-99  F (37.2  C)] 98.9  F (37.2  C)  Pulse:  [55-77] 64  Resp:  [12-18] 16  BP: ()/(63-86) 132/80  SpO2:  [91 %-99 %] 96 %  Patient Vitals for the past 24 hrs:   BP Temp Temp src Pulse Resp SpO2   11/04/22 1000 132/80 -- -- 64 16 96 %   11/04/22 0900 128/86 -- -- 63 18 96 %   11/04/22 0800 126/76 98.9  F (37.2  C) Axillary 58 14 96 %   11/04/22 0700 119/73 -- -- 58 -- 99 %   11/04/22 0600 129/84 -- -- 55 -- 99 %   11/04/22 0500 124/78 -- -- 55 -- 99 %   11/04/22 0400 123/77 98.6  F (37  C) Axillary 56 12 97 %   11/04/22 0200 105/63 -- -- 60 -- 96 %   11/04/22 0100 -- -- -- -- -- 94 %   11/04/22 0000 119/71 98.8  F (37.1  C) Oral 61 16 96 %   11/03/22 2300 101/66 -- -- 62 -- 95 %   11/03/22 2200 120/79 -- -- 59 -- 96 %   11/03/22 2100 109/75 -- -- 64 -- 93 %   11/03/22 2000 112/73 98.5  F (36.9  C) Oral 66 13 91 %   11/03/22 1900 (!) 80/66 -- -- 69 -- 94 %   11/03/22 1800 113/75 -- -- 66 -- 94 %   11/03/22 1700 115/68 -- -- 68 -- 94 %   11/03/22 1600 106/73 99  F (37.2  C) Oral 74 -- 92 %   11/03/22 1500 98/65 -- -- 71 -- 93 %   11/03/22 1400 123/76 -- -- 66 -- 95 %   11/03/22 1300 128/80 -- -- 77 -- 93 %   11/03/22 1200 129/75 99  F (37.2  C) Oral 72 -- 97 %     General Appearance: in no apparent distress. Sleepy  Skin: normal, warm, dry, No rashes, induration, or jaundice  Heart: regular rate and rhythm,   Lungs: NLB on 2L  Abdomen: soft, tender to palpation, incision with staples CDI. TERESITA x1 to Right abdomen with serosanguinous output.  : Voiding  Extremities: edema: present bilateral. 1-2+.  Neurologic: A&Ox3, a little somnolent. Tremor absent. Asterixis: absent.    Frailty Scores     Frailty Scores 1/6/2022 10/18/2020    Final Score Frail Not Frail    Final Score Number 3 2          Data: "   [facility]  Recent Labs   Lab 11/04/22  1010 11/04/22  0704 11/04/22  0612 11/04/22  0607 11/04/22  0404 11/04/22  0400 11/03/22  1210 11/03/22  1207 11/03/22  0452 11/03/22  0358 11/02/22  1625 11/02/22  1434 11/02/22  0955 11/01/22  2038   NA  --   --   --  141  --   --   --  139   < > 143   < > 142   < > 149*   POTASSIUM  --   --   --  4.1  --   --   --  4.0   < > 3.9   < > 2.9*   < > 4.1   CHLORIDE  --   --   --  106  --   --   --  105   < > 107   < >  --   --  117*   CO2  --   --   --  23  --   --   --  25   < > 24   < >  --   --  17*   GLC 84 102*   < > 105*   < >  --    < > 122*   < > 149*   < > 263*   < > 130*   BUN  --   --   --  38.2*  --   --   --  22.6*   < > 17.1   < >  --   --  9.9   CR  --   --   --  1.52*  --   --   --  1.02*   < > 0.87   < >  --   --  0.93   GFRESTIMATED  --   --   --  41*  --   --   --  67   < > 81   < >  --   --  75   MAURI  --   --   --  7.9*  --   --   --  8.3*   < > 8.8   < >  --   --  8.6   ICAW  --   --   --   --   --   --   --   --   --  4.7  --  5.3*   < >  --    MAG  --   --   --   --   --  2.3  --   --   --  2.0  --   --   --  1.7   PHOS  --   --   --  6.8*  --   --   --   --   --  4.6*  --   --   --  3.3   AMYLASE  --   --   --   --   --   --   --   --   --  65  --   --   --  45   LIPASE  --   --   --   --   --   --   --   --   --  6*  --   --   --   --    ALBUMIN  --   --   --  3.0*  --   --   --  3.0*   < > 3.2*   < >  --   --  2.7*   BILITOTAL  --   --   --  0.6  --   --   --  1.0   < > 1.2   < >  --   --  1.5*   ALKPHOS  --   --   --  39  --   --   --  51   < > 48   < >  --   --  86   AST  --   --   --  186*  --   --   --  320*   < > 479*   < >  --   --  46*   ALT  --   --   --  245*  --   --   --  322*   < > 371*   < >  --   --  7*    < > = values in this interval not displayed.     CBC  Recent Labs   Lab 11/04/22  0214 11/03/22  1654 11/02/22  1625 11/02/22  1450   HGB 8.5* 8.6*   < > 9.8*   WBC 5.6 6.5   < >  --    PLT 74* 83*   < > 96*   A1C  --   --   --  5.7*    < > =  values in this interval not displayed.     COAGS  Recent Labs   Lab 11/04/22  0607 11/03/22  0850 11/02/22  1815 11/02/22  1625 11/02/22  1450   INR 1.32* 1.33*   < > 1.94* 2.79*   PTT  --   --   --  40* 65*    < > = values in this interval not displayed.      Urinalysis  Recent Labs   Lab Test 11/01/22  2133 07/09/22  1506   COLOR Yellow Straw   APPEARANCE Clear Clear   URINEGLC Negative >=1000*   URINEBILI Negative Negative   URINEKETONE Negative Negative   SG 1.015 1.011   UBLD Negative Negative   URINEPH 7.0 5.0   PROTEIN Negative Negative   NITRITE Negative Negative   LEUKEST Negative Negative   RBCU 1 0   WBCU 1 0     Virology:  Hepatitis C Antibody   Date Value Ref Range Status   11/01/2022 Nonreactive Nonreactive Final

## 2022-11-04 NOTE — PROGRESS NOTES
"CLINICAL NUTRITION SERVICES - BRIEF NOTE   (See RD note on 11/2 for full assessment)     Reason for RD note: po diet advancement    New Findings/Chart Review:  Diet advanced to regular this am, calorie counts ordered 11/5-11/7  Pt seen, appears groggy and states she is not interested in eating.  Has tried oral supplements in the past \"they all make me gag\".      Interventions:  - Trial Magic cup (berry or orange) BID with meals, pt agreeable  - Encouraged small frequent meals, protein dense food choices  - Ordered PTA creon 24, 2 capsules with meals    Future/Additional Recommendations:  - Resume SAMI plus one tablet as appropriate per transplant team discretion  - Calorie count results to follow    Nutrition will continue to follow per protocol.    Amy Singh, RD, LD, CNSC  4A SICU RD pager: [phone]  Ascom: [phone]    "

## 2022-11-04 NOTE — PLAN OF CARE
Major Shift Events: A&O x4, anxious, slow to commands, lethargic. NSR, MAP goal > 60 without intervention. LS clear, 2L NC while sleeping, productive cough. Rossi in place with minimal (~15mL/hr - MD notified) green/blue output (d/t methylene blue). Clam shell surgical incision TRACEY, CDI with staples. 2 TERESITA drains in LRQ, TERESITA drain #1 ranged 3-30 mL, TERESITA drain #2 ranged  mL, both serosanguinous output. Both CVC triple lumen & TERESITA drains dressings changed. 1 bag of albumin given, Insulin drip on algorithm 2, LR running @ 100.    Plan: Maintain MAP > 60, transfer order to    For vital signs and complete assessments, please see documentation flowsheets.    Amy Vicente RN

## 2022-11-04 NOTE — PLAN OF CARE
Admitted/transferred from:   2 RN full   skin assessment completed by Celeste Muller, RN and Leticia HANSEN RN.   Skin assessment finding: issues found Surgical incision, TERESITA drain x1, galloway, blanchable redness to sacrum , right internal jugular.  Interventions/actions: skin interventions off loading      Will continue to monitor.

## 2022-11-05 ENCOUNTER — APPOINTMENT (OUTPATIENT)
Dept: ULTRASOUND IMAGING | Facility: CLINIC | Age: 52
DRG: 005 | End: 2022-11-05
Attending: INTERNAL MEDICINE
Payer: COMMERCIAL

## 2022-11-05 ENCOUNTER — APPOINTMENT (OUTPATIENT)
Dept: OCCUPATIONAL THERAPY | Facility: CLINIC | Age: 52
DRG: 005 | End: 2022-11-05
Attending: TRANSPLANT SURGERY
Payer: COMMERCIAL

## 2022-11-05 LAB
ALBUMIN SERPL BCG-MCNC: 3.5 G/DL (ref 3.5–5.2)
ALBUMIN UR-MCNC: 20 MG/DL
ALP SERPL-CCNC: 62 U/L (ref 35–104)
ALT SERPL W P-5'-P-CCNC: 207 U/L (ref 10–35)
ANION GAP SERPL CALCULATED.3IONS-SCNC: 15 MMOL/L (ref 7–15)
APPEARANCE UR: CLEAR
AST SERPL W P-5'-P-CCNC: 91 U/L (ref 10–35)
BASOPHILS # BLD AUTO: 0 10E3/UL (ref 0–0.2)
BASOPHILS NFR BLD AUTO: 0 %
BILIRUB DIRECT SERPL-MCNC: 0.26 MG/DL (ref 0–0.3)
BILIRUB SERPL-MCNC: 0.5 MG/DL
BILIRUB UR QL STRIP: NEGATIVE
BUN SERPL-MCNC: 46.6 MG/DL (ref 6–20)
CALCIUM SERPL-MCNC: 8 MG/DL (ref 8.6–10)
CHLORIDE SERPL-SCNC: 102 MMOL/L (ref 98–107)
COLOR UR AUTO: ABNORMAL
CREAT SERPL-MCNC: 1.75 MG/DL (ref 0.51–0.95)
DEPRECATED HCO3 PLAS-SCNC: 21 MMOL/L (ref 22–29)
EOSINOPHIL # BLD AUTO: 0.2 10E3/UL (ref 0–0.7)
EOSINOPHIL NFR BLD AUTO: 3 %
ERYTHROCYTE [DISTWIDTH] IN BLOOD BY AUTOMATED COUNT: 17 % (ref 10–15)
GFR SERPL CREATININE-BSD FRML MDRD: 35 ML/MIN/1.73M2
GLUCOSE BLDC GLUCOMTR-MCNC: 194 MG/DL (ref 70–99)
GLUCOSE BLDC GLUCOMTR-MCNC: 207 MG/DL (ref 70–99)
GLUCOSE BLDC GLUCOMTR-MCNC: 220 MG/DL (ref 70–99)
GLUCOSE BLDC GLUCOMTR-MCNC: 224 MG/DL (ref 70–99)
GLUCOSE BLDC GLUCOMTR-MCNC: 266 MG/DL (ref 70–99)
GLUCOSE SERPL-MCNC: 225 MG/DL (ref 70–99)
GLUCOSE UR STRIP-MCNC: NEGATIVE MG/DL
HCT VFR BLD AUTO: 28.6 % (ref 35–47)
HGB BLD-MCNC: 9.1 G/DL (ref 11.7–15.7)
HGB UR QL STRIP: NEGATIVE
HYALINE CASTS: 8 /LPF
IMM GRANULOCYTES # BLD: 0 10E3/UL
IMM GRANULOCYTES NFR BLD: 0 %
KETONES UR STRIP-MCNC: 10 MG/DL
LEUKOCYTE ESTERASE UR QL STRIP: NEGATIVE
LYMPHOCYTES # BLD AUTO: 0.9 10E3/UL (ref 0.8–5.3)
LYMPHOCYTES NFR BLD AUTO: 17 %
MAGNESIUM SERPL-MCNC: 2.3 MG/DL (ref 1.7–2.3)
MCH RBC QN AUTO: 31.3 PG (ref 26.5–33)
MCHC RBC AUTO-ENTMCNC: 31.8 G/DL (ref 31.5–36.5)
MCV RBC AUTO: 98 FL (ref 78–100)
MONOCYTES # BLD AUTO: 0.3 10E3/UL (ref 0–1.3)
MONOCYTES NFR BLD AUTO: 5 %
MUCOUS THREADS #/AREA URNS LPF: PRESENT /LPF
NEUTROPHILS # BLD AUTO: 3.9 10E3/UL (ref 1.6–8.3)
NEUTROPHILS NFR BLD AUTO: 75 %
NITRATE UR QL: NEGATIVE
NRBC # BLD AUTO: 0 10E3/UL
NRBC BLD AUTO-RTO: 0 /100
PH UR STRIP: 5.5 [PH] (ref 5–7)
PHOSPHATE SERPL-MCNC: 6.2 MG/DL (ref 2.5–4.5)
PLATELET # BLD AUTO: 71 10E3/UL (ref 150–450)
POTASSIUM SERPL-SCNC: 4 MMOL/L (ref 3.4–5.3)
PROT SERPL-MCNC: 5.4 G/DL (ref 6.4–8.3)
RBC # BLD AUTO: 2.91 10E6/UL (ref 3.8–5.2)
RBC URINE: 1 /HPF
SODIUM SERPL-SCNC: 138 MMOL/L (ref 136–145)
SP GR UR STRIP: 1.03 (ref 1–1.03)
SQUAMOUS EPITHELIAL: 1 /HPF
TACROLIMUS BLD-MCNC: 3 UG/L (ref 5–15)
TME LAST DOSE: ABNORMAL H
TME LAST DOSE: ABNORMAL H
UROBILINOGEN UR STRIP-MCNC: NORMAL MG/DL
WBC # BLD AUTO: 5.3 10E3/UL (ref 4–11)
WBC URINE: 3 /HPF

## 2022-11-05 PROCEDURE — 120N000011 HC R&B TRANSPLANT UMMC

## 2022-11-05 PROCEDURE — 81001 URINALYSIS AUTO W/SCOPE: CPT | Performed by: INTERNAL MEDICINE

## 2022-11-05 PROCEDURE — 83735 ASSAY OF MAGNESIUM: CPT | Performed by: NURSE PRACTITIONER

## 2022-11-05 PROCEDURE — 85025 COMPLETE CBC W/AUTO DIFF WBC: CPT | Performed by: NURSE PRACTITIONER

## 2022-11-05 PROCEDURE — 82374 ASSAY BLOOD CARBON DIOXIDE: CPT | Performed by: NURSE PRACTITIONER

## 2022-11-05 PROCEDURE — 250N000013 HC RX MED GY IP 250 OP 250 PS 637: Performed by: NURSE PRACTITIONER

## 2022-11-05 PROCEDURE — 84100 ASSAY OF PHOSPHORUS: CPT | Performed by: NURSE PRACTITIONER

## 2022-11-05 PROCEDURE — 82248 BILIRUBIN DIRECT: CPT | Performed by: NURSE PRACTITIONER

## 2022-11-05 PROCEDURE — 250N000013 HC RX MED GY IP 250 OP 250 PS 637: Performed by: PHYSICIAN ASSISTANT

## 2022-11-05 PROCEDURE — 250N000011 HC RX IP 250 OP 636: Performed by: PHYSICIAN ASSISTANT

## 2022-11-05 PROCEDURE — 250N000013 HC RX MED GY IP 250 OP 250 PS 637: Performed by: SURGERY

## 2022-11-05 PROCEDURE — 250N000011 HC RX IP 250 OP 636: Performed by: NURSE PRACTITIONER

## 2022-11-05 PROCEDURE — P9047 ALBUMIN (HUMAN), 25%, 50ML: HCPCS | Performed by: PHYSICIAN ASSISTANT

## 2022-11-05 PROCEDURE — 36415 COLL VENOUS BLD VENIPUNCTURE: CPT | Performed by: NURSE PRACTITIONER

## 2022-11-05 PROCEDURE — 97535 SELF CARE MNGMENT TRAINING: CPT | Mod: GO

## 2022-11-05 PROCEDURE — 84300 ASSAY OF URINE SODIUM: CPT | Performed by: INTERNAL MEDICINE

## 2022-11-05 PROCEDURE — 250N000012 HC RX MED GY IP 250 OP 636 PS 637: Performed by: NURSE PRACTITIONER

## 2022-11-05 PROCEDURE — 76770 US EXAM ABDO BACK WALL COMP: CPT | Mod: 26 | Performed by: RADIOLOGY

## 2022-11-05 PROCEDURE — 99232 SBSQ HOSP IP/OBS MODERATE 35: CPT | Mod: 24 | Performed by: TRANSPLANT SURGERY

## 2022-11-05 PROCEDURE — 80197 ASSAY OF TACROLIMUS: CPT | Performed by: TRANSPLANT SURGERY

## 2022-11-05 PROCEDURE — 99223 1ST HOSP IP/OBS HIGH 75: CPT | Mod: 24 | Performed by: INTERNAL MEDICINE

## 2022-11-05 PROCEDURE — 250N000012 HC RX MED GY IP 250 OP 636 PS 637: Performed by: PHYSICIAN ASSISTANT

## 2022-11-05 PROCEDURE — 76770 US EXAM ABDO BACK WALL COMP: CPT

## 2022-11-05 PROCEDURE — 250N000013 HC RX MED GY IP 250 OP 250 PS 637: Performed by: TRANSPLANT SURGERY

## 2022-11-05 RX ORDER — METHOCARBAMOL 500 MG/1
500 TABLET, FILM COATED ORAL 4 TIMES DAILY
Status: DISCONTINUED | OUTPATIENT
Start: 2022-11-05 | End: 2022-11-10 | Stop reason: HOSPADM

## 2022-11-05 RX ORDER — VALGANCICLOVIR 450 MG/1
450 TABLET, FILM COATED ORAL DAILY
Status: DISCONTINUED | OUTPATIENT
Start: 2022-11-06 | End: 2022-11-10 | Stop reason: HOSPADM

## 2022-11-05 RX ORDER — LIDOCAINE 4 G/G
2-3 PATCH TOPICAL EVERY 24 HOURS
Status: DISCONTINUED | OUTPATIENT
Start: 2022-11-06 | End: 2022-11-08

## 2022-11-05 RX ORDER — FUROSEMIDE 10 MG/ML
80 INJECTION INTRAMUSCULAR; INTRAVENOUS
Status: DISCONTINUED | OUTPATIENT
Start: 2022-11-05 | End: 2022-11-08

## 2022-11-05 RX ADMIN — HYDROXYZINE HYDROCHLORIDE 50 MG: 25 TABLET, FILM COATED ORAL at 03:48

## 2022-11-05 RX ADMIN — INSULIN ASPART 2 UNITS: 100 INJECTION, SOLUTION INTRAVENOUS; SUBCUTANEOUS at 13:58

## 2022-11-05 RX ADMIN — HYDROMORPHONE HYDROCHLORIDE 0.3 MG: 1 INJECTION, SOLUTION INTRAMUSCULAR; INTRAVENOUS; SUBCUTANEOUS at 15:59

## 2022-11-05 RX ADMIN — NYSTATIN 500000 UNITS: 100000 SUSPENSION ORAL at 08:37

## 2022-11-05 RX ADMIN — INSULIN ASPART 2 UNITS: 100 INJECTION, SOLUTION INTRAVENOUS; SUBCUTANEOUS at 10:17

## 2022-11-05 RX ADMIN — HYDROMORPHONE HYDROCHLORIDE 0.3 MG: 1 INJECTION, SOLUTION INTRAMUSCULAR; INTRAVENOUS; SUBCUTANEOUS at 19:15

## 2022-11-05 RX ADMIN — METHOCARBAMOL 500 MG: 500 TABLET ORAL at 13:53

## 2022-11-05 RX ADMIN — FUROSEMIDE 80 MG: 10 INJECTION, SOLUTION INTRAMUSCULAR; INTRAVENOUS at 12:03

## 2022-11-05 RX ADMIN — HYDROMORPHONE HYDROCHLORIDE 0.3 MG: 1 INJECTION, SOLUTION INTRAMUSCULAR; INTRAVENOUS; SUBCUTANEOUS at 23:10

## 2022-11-05 RX ADMIN — TRAZODONE HYDROCHLORIDE 50 MG: 50 TABLET ORAL at 21:22

## 2022-11-05 RX ADMIN — NYSTATIN 500000 UNITS: 100000 SUSPENSION ORAL at 15:59

## 2022-11-05 RX ADMIN — INSULIN ASPART 3 UNITS: 100 INJECTION, SOLUTION INTRAVENOUS; SUBCUTANEOUS at 19:24

## 2022-11-05 RX ADMIN — VALGANCICLOVIR 450 MG: 450 TABLET, FILM COATED ORAL at 08:35

## 2022-11-05 RX ADMIN — POLYETHYLENE GLYCOL 3350 17 G: 17 POWDER, FOR SOLUTION ORAL at 09:32

## 2022-11-05 RX ADMIN — OXYCODONE HYDROCHLORIDE 5 MG: 5 TABLET ORAL at 08:33

## 2022-11-05 RX ADMIN — TACROLIMUS 6 MG: 5 CAPSULE ORAL at 19:09

## 2022-11-05 RX ADMIN — OXYCODONE HYDROCHLORIDE 5 MG: 5 TABLET ORAL at 20:12

## 2022-11-05 RX ADMIN — METHOCARBAMOL 500 MG: 500 TABLET ORAL at 19:10

## 2022-11-05 RX ADMIN — OXYCODONE HYDROCHLORIDE 5 MG: 5 TABLET ORAL at 04:34

## 2022-11-05 RX ADMIN — PANTOPRAZOLE SODIUM 40 MG: 40 TABLET, DELAYED RELEASE ORAL at 08:36

## 2022-11-05 RX ADMIN — HYDROMORPHONE HYDROCHLORIDE 0.3 MG: 1 INJECTION, SOLUTION INTRAMUSCULAR; INTRAVENOUS; SUBCUTANEOUS at 05:47

## 2022-11-05 RX ADMIN — NYSTATIN 500000 UNITS: 100000 SUSPENSION ORAL at 19:09

## 2022-11-05 RX ADMIN — ASPIRIN 81 MG CHEWABLE TABLET 81 MG: 81 TABLET CHEWABLE at 08:36

## 2022-11-05 RX ADMIN — OXYCODONE HYDROCHLORIDE 5 MG: 5 TABLET ORAL at 00:33

## 2022-11-05 RX ADMIN — HYDROXYZINE HYDROCHLORIDE 50 MG: 25 TABLET, FILM COATED ORAL at 19:20

## 2022-11-05 RX ADMIN — LEVOTHYROXINE SODIUM 150 MCG: 0.05 TABLET ORAL at 08:35

## 2022-11-05 RX ADMIN — PANCRELIPASE 2 CAPSULE: 24000; 76000; 120000 CAPSULE, DELAYED RELEASE PELLETS ORAL at 19:19

## 2022-11-05 RX ADMIN — TACROLIMUS 4 MG: 1 CAPSULE ORAL at 08:35

## 2022-11-05 RX ADMIN — INSULIN GLARGINE 5 UNITS: 100 INJECTION, SOLUTION SUBCUTANEOUS at 10:16

## 2022-11-05 RX ADMIN — MYCOPHENOLATE MOFETIL 750 MG: 250 CAPSULE ORAL at 08:35

## 2022-11-05 RX ADMIN — HYDROMORPHONE HYDROCHLORIDE 0.3 MG: 1 INJECTION, SOLUTION INTRAMUSCULAR; INTRAVENOUS; SUBCUTANEOUS at 01:15

## 2022-11-05 RX ADMIN — SENNOSIDES 2 TABLET: 8.6 TABLET, FILM COATED ORAL at 08:37

## 2022-11-05 RX ADMIN — ESCITALOPRAM OXALATE 10 MG: 10 TABLET ORAL at 08:36

## 2022-11-05 RX ADMIN — METHYLPREDNISOLONE SODIUM SUCCINATE 50 MG: 125 INJECTION, POWDER, FOR SOLUTION INTRAMUSCULAR; INTRAVENOUS at 08:38

## 2022-11-05 RX ADMIN — SULFAMETHOXAZOLE AND TRIMETHOPRIM 1 TABLET: 400; 80 TABLET ORAL at 08:36

## 2022-11-05 RX ADMIN — TOPIRAMATE 50 MG: 50 TABLET ORAL at 08:36

## 2022-11-05 RX ADMIN — ALBUMIN HUMAN 12.5 G: 0.25 SOLUTION INTRAVENOUS at 19:09

## 2022-11-05 RX ADMIN — HYDROMORPHONE HYDROCHLORIDE 0.3 MG: 1 INJECTION, SOLUTION INTRAMUSCULAR; INTRAVENOUS; SUBCUTANEOUS at 12:02

## 2022-11-05 RX ADMIN — SENNOSIDES 2 TABLET: 8.6 TABLET, FILM COATED ORAL at 19:10

## 2022-11-05 RX ADMIN — FUROSEMIDE 80 MG: 10 INJECTION, SOLUTION INTRAMUSCULAR; INTRAVENOUS at 20:07

## 2022-11-05 RX ADMIN — ALBUMIN HUMAN 12.5 G: 0.25 SOLUTION INTRAVENOUS at 08:43

## 2022-11-05 RX ADMIN — MYCOPHENOLATE MOFETIL 750 MG: 250 CAPSULE ORAL at 19:09

## 2022-11-05 RX ADMIN — OXYCODONE HYDROCHLORIDE 5 MG: 5 TABLET ORAL at 14:03

## 2022-11-05 ASSESSMENT — ACTIVITIES OF DAILY LIVING (ADL)
ADLS_ACUITY_SCORE: 37

## 2022-11-05 NOTE — PLAN OF CARE
"/74 (BP Location: Left arm)   Pulse 59   Temp 98.5  F (36.9  C) (Oral)   Resp 16   Ht 1.676 m (5' 6\")   Wt 79.8 kg (175 lb 14.8 oz)   SpO2 98%   BMI 28.40 kg/m      [id number]  Neuro:  Pt. alert & Ox4  Behavior: Pt. calm & cooperative with cares.   Activity: Pt. up with assist 2. Pt. Assisted to recliner this morning d/t back pain possibly from bed.  Vital: AVSS on  L/NC  BG: ACHS. 2am B    LDAs: Right internal jugular & PIV SL Right TERESITA with scant serosang. drainage.    Cardiac: WNL  Respiratory: LS coarse in upper lobes, diminished bases.  GI/: Rossi with 500cc output, no stools  this shift.   Skin: Incision stapled & TRACEY.  Pain/Nausea: Abdominal pain managed with prn Oxycodone, prn IV Dilaudid, prn Atarax. Pt. denies nausea  Diet: Regular  Labs/Imaging:  Morning labs pending.  Education: Lab book & med. card created.  Plan: Continue to follow POC & notify MD with change in status.      "

## 2022-11-05 NOTE — PLAN OF CARE
"Vitals: /77 (BP Location: Right arm)   Pulse 99   Temp 98.4  F (36.9  C) (Oral)   Resp 16   Ht 1.676 m (5' 6\")   Wt 95.5 kg (210 lb 9.6 oz)   SpO2 98%   BMI 33.99 kg/m      Endocrine: Glucose 194, Novolog correction, started on Lantus 5U.  Labs: Stable labs, slightly elevated creatinine.  Pain: Moderate back and abdominal pain, 9/10.   PRN's: Oxycodone 5 mg, IV Dilaudid 0.3 mg, started on scheduled Robaxin.   Diet: Regular diet, on calorie counts.  LDA: R TL internal jugular saline locked. PIV DC'd.    GI: No BM since surgery, took Senna and Miralax.  : Orders for galloway to be DC'd, large output post IV Lasix. Assess taking out this afternoon.  Skin: Jaundiced, abdominal incision with staples, TERESITA removed.  Neuro: Anxious, oriented X3.  Mobility: Stand by assist of 1-2, recliner chair at the bedside.   Education: Unable to do education due to pain.  Plan: Increase activity as tolerated.                          "

## 2022-11-05 NOTE — PROGRESS NOTES
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 She is now s/p OLTx 11/2 with Dr. Delbert Morgan.    Graft function:  Liver transplant: POD # 3. Liver transaminases trending down, INR 1.3. TERESITA x1 in place with 24cc output, removed lateral drain on 11/4, removed last drain on 11/5.   Graft patency ppx: ASA 81 daily  Liver US: POD #0, Patent doppler, Elevated RI=1 in HA  Repeat US POD #1, unchanged elevated RI in HA  Repeat US POD #2, continued elevated RIs, slightly improved flow in the left hepatic artery   Immunosuppression management:  Steroid taper induction  Maintenance:  Tac 6mg BID (goal 8-12)  MMF 750mg BID  Complexity of management:High. Contributing factors: induction, Flucon x2, now completed.   Hematology:   Acute blood loss/Anemia of chronic disease: Hgb stable 8-9  Thrombocytopenia: due to liver disease, Plts >70  Coagulopathy: due to liver disease, INR 1.3. 1U cryo overnight on 11/3.  Neurology:  Acute postoperative pain: Continue Oxycodone and dilaudid IV PRN, Add Robaxin and lidocaine patches.  Hx of hypoglycemic seizures: continue topamax  Depression: Continue lexapro, Trazadone  Cardiorespiratory:   Postop ventilatory support: Extubated to 0.5L NC. Encourage CDB/IS  GI/Nutrition: Advance to diet as tolerated.   Hx of Gastric bypass:  Pancreatic insufficiency: on Creon PTA, restart.   Continue bowel regimen senna and miralax  Endocrine:   Steroid induced hyperglycemia/DM: on Januvia and insulin PTA, started insulin gtt initially post operatively. Discontinued insulin gtt and started ssi prn, add Lantus 5 units daily.   Hypothyroid: continue synthroid   Fluid/Electrolytes:   MIVF: LR@100, discontined  Hypoalbuminemia: Albumin 3.5.   GEORGETTE: Cr 1.8<-1.5<-1. Give albumin 25% BID and Lasix 80 mg IV BID.   Electrolytes WNL  : Rossi  removed, continue strict I&Os   Infectious disease: Afebrile, WBC normal  Covid + 11/1: Cycle threshold =39.7, covid recovered     Prophylaxis: DVT(SCDs, GI (PPI), fungal (diflucan x1, nystatin), PCP ppx (bactrim), CMV ppx (valcyte)   Periop ppx zosyn x48hrs,   Disposition: 7A, PT/OT    Medical Decision Making: High  Subsequent visit 07541 (high level decision making)     ANYA/Fellow/Resident Provider: Amy Hansen PA-C     Faculty: Delbert Morgan MD    __________________________________________________________________  Transplant History: Admitted 11/1/2022 for Liver transplant .    11/2/2022 (Liver), Postoperative day: 3     Interval History: History is obtained from the patient  Overnight events: Pain not well controlled.    ROS:   A 10-point review of systems was negative except as noted above.    Curent Meds:    albumin human  12.5 g Intravenous BID     amylase-lipase-protease  2 capsule Oral TID w/meals     aspirin  81 mg Oral Daily     escitalopram  10 mg Oral or Feeding Tube Daily     furosemide  80 mg Intravenous BID     insulin aspart  1-7 Units Subcutaneous TID AC     insulin aspart  1-5 Units Subcutaneous At Bedtime     insulin glargine  5 Units Subcutaneous QAM AC     levothyroxine  150 mcg Oral or Feeding Tube QAM AC     [START ON 11/6/2022] lidocaine  2-3 patch Transdermal Q24H     lidocaine   Transdermal Q8H JULIEN     methocarbamol  500 mg Oral 4x Daily     mycophenolate  750 mg Oral BID IS     nystatin  500,000 Units Oral 4x Daily     pantoprazole  40 mg Oral Daily     polyethylene glycol  17 g Oral Daily     [START ON 11/6/2022] predniSONE  25 mg Oral Once    Followed by     [START ON 11/7/2022] predniSONE  10 mg Oral Once     sennosides  2 tablet Oral or Feeding Tube BID     sodium chloride (PF)  3 mL Intravenous Q8H     sulfamethoxazole-trimethoprim  1 tablet Oral or Feeding Tube Daily     tacrolimus  6 mg Oral BID IS     topiramate  50 mg Oral or Feeding Tube Daily     traZODone  50 mg Oral or  "Feeding Tube At Bedtime     [START ON 11/6/2022] valGANciclovir  450 mg Oral Daily       Physical Exam:     Admit Weight: 79.9 kg (176 lb 1.6 oz)    Current Vitals:   /77 (BP Location: Right arm)   Pulse 99   Temp 98.4  F (36.9  C) (Oral)   Resp 16   Ht 1.676 m (5' 6\")   Wt 95.5 kg (210 lb 9.6 oz)   SpO2 98%   BMI 33.99 kg/m      Vital sign ranges:    Temp:  [98.3  F (36.8  C)-98.5  F (36.9  C)] 98.4  F (36.9  C)  Pulse:  [56-99] 99  Resp:  [16-18] 16  BP: (117-143)/(67-82) 123/77  SpO2:  [90 %-99 %] 98 %  Patient Vitals for the past 24 hrs:   BP Temp Temp src Pulse Resp SpO2 Weight   11/05/22 0957 123/77 98.4  F (36.9  C) Oral 99 16 98 % --   11/05/22 0800 -- -- -- -- -- -- 95.5 kg (210 lb 9.6 oz)   11/05/22 0547 119/74 98.5  F (36.9  C) Oral 59 16 98 % --   11/05/22 0117 122/67 98.3  F (36.8  C) Oral 57 17 99 % --   11/04/22 2228 127/72 98.3  F (36.8  C) Oral 56 18 99 % --   11/04/22 1940 -- -- -- -- -- 98 % --   11/04/22 1700 (!) 143/82 -- -- 60 -- 94 % --   11/04/22 1600 -- 98.5  F (36.9  C) Axillary -- 16 90 % --   11/04/22 1500 -- -- -- -- 18 90 % --   11/04/22 1400 117/74 -- -- 58 18 95 % --   11/04/22 1300 117/74 -- -- 60 16 94 % --     General Appearance: in no apparent distress.  Skin: normal, warm, dry, No rashes, induration, or jaundice  Heart: regular rate and rhythm,   Lungs: NLB on 0.5L  Abdomen: soft, tender to palpation, incision with staples CDI.   : Voiding  Extremities: edema: present bilateral. 1-2+.  Neurologic: A&Ox3, a little somnolent. Tremor absent. Asterixis: absent.    Frailty Scores     Frailty Scores 1/6/2022 10/18/2020    Final Score Frail Not Frail    Final Score Number 3 2          Data:   CMP  Recent Labs   Lab 11/05/22  1006 11/05/22  0600 11/04/22  0612 11/04/22  0607 11/04/22  0404 11/04/22  0400 11/03/22  0452 11/03/22  0358 11/02/22  1625 11/02/22  1434 11/02/22  0955 11/01/22 2038   NA  --  138  --  141  --   --    < > 143   < > 142   < > 149*   POTASSIUM  --  " 4.0  --  4.1  --   --    < > 3.9   < > 2.9*   < > 4.1   CHLORIDE  --  102  --  106  --   --    < > 107   < >  --   --  117*   CO2  --  21*  --  23  --   --    < > 24   < >  --   --  17*   * 225*   < > 105*   < >  --    < > 149*   < > 263*   < > 130*   BUN  --  46.6*  --  38.2*  --   --    < > 17.1   < >  --   --  9.9   CR  --  1.75*  --  1.52*  --   --    < > 0.87   < >  --   --  0.93   GFRESTIMATED  --  35*  --  41*  --   --    < > 81   < >  --   --  75   MAURI  --  8.0*  --  7.9*  --   --    < > 8.8   < >  --   --  8.6   ICAW  --   --   --   --   --   --   --  4.7  --  5.3*   < >  --    MAG  --  2.3  --   --   --  2.3  --  2.0  --   --   --  1.7   PHOS  --  6.2*  --  6.8*  --   --   --  4.6*  --   --   --  3.3   AMYLASE  --   --   --   --   --   --   --  65  --   --   --  45   LIPASE  --   --   --   --   --   --   --  6*  --   --   --   --    ALBUMIN  --  3.5  --  3.0*  --   --    < > 3.2*   < >  --   --  2.7*   BILITOTAL  --  0.5  --  0.6  --   --    < > 1.2   < >  --   --  1.5*   ALKPHOS  --  62  --  39  --   --    < > 48   < >  --   --  86   AST  --  91*  --  186*  --   --    < > 479*   < >  --   --  46*   ALT  --  207*  --  245*  --   --    < > 371*   < >  --   --  7*    < > = values in this interval not displayed.     CBC  Recent Labs   Lab 11/05/22  0600 11/04/22  0214 11/02/22  1625 11/02/22  1450   HGB 9.1* 8.5*   < > 9.8*   WBC 5.3 5.6   < >  --    PLT 71* 74*   < > 96*   A1C  --   --   --  5.7*    < > = values in this interval not displayed.     COAGS  Recent Labs   Lab 11/04/22  0607 11/03/22  0850 11/02/22  1815 11/02/22  1625 11/02/22  1450   INR 1.32* 1.33*   < > 1.94* 2.79*   PTT  --   --   --  40* 65*    < > = values in this interval not displayed.      Urinalysis  Recent Labs   Lab Test 11/05/22  0928 11/01/22  2133   COLOR Light Yellow Yellow   APPEARANCE Clear Clear   URINEGLC Negative Negative   URINEBILI Negative Negative   URINEKETONE 10* Negative   SG 1.026 1.015   UBLD Negative  Negative   URINEPH 5.5 7.0   PROTEIN 20* Negative   NITRITE Negative Negative   LEUKEST Negative Negative   RBCU 1 1   WBCU 3 1     Virology:  Hepatitis C Antibody   Date Value Ref Range Status   11/01/2022 Nonreactive Nonreactive Final

## 2022-11-05 NOTE — CONSULTS
[facility]  Transplant Nephrology Consult  Date of Admission:  11/1/2022  Today's Date: 11/05/2022  Requesting physician: Delbert Morgan MD    Recommendations:  -Continue albumin 25% 12.5g BID and give lasix 80mg IV BID today. No acute indication for RRT today  -Strict Is and Os, daily standing weight  -Follow up urine sodium    Assessment & Plan   # GEORGETTE: Trend up in Scr likely hemodynamic with U/A showing only hyaline casts. Non oliguric. Renal U/S 11/5 normal   - Baseline Creatinine: ~ 0.7-0.9   - Proteinuria: Not checked recently   - Kidney Biopsy: No    -No acute indication for RRT. She does not have dialysis access    # Liver Tx:  Downtrending LFTs. Management per transplant surgery    # Immunosuppression: Tacrolimus immediate release (goal 8-12), Mycophenolate mofetil (dose 750 mg every 12 hours) and Prednisone (dose taper)   - Changes: Not at this time. Management per transplant surgery    # Infection Prophylaxis:   - PJP: Sulfa/TMP (Bactrim)  - CMV: Valganciclovir (Valcyte)  - Thrush: Nystatin (Mycostatin) swish and swallow    # Hypertension: Controlled;  Goal BP: < 140/90 (Hospitalization goal)   - Volume status: Total body volume up, but intravascularly hypovolemic     - Changes: Yes - start lasix 80mg IV BID today    # Pancreatic insufficiency:    -Agree with restarting Creon    # Elevated Blood Glucose: Glucose generally running ~ 200   - Management as per primary team.    # Anemia of acute blood loss: Hgb: Stable      KLAUS: No   - Iron studies: Not checked recently    # Mineral Bone Disorder:   - Calcium; level: Normal        On supplement: No  - Phosphorus; level: High and trending up        On binder: No    # Electrolytes:   - Potassium; level: Normal        On supplement: No  - Magnesium; level: Normal        On supplement: No  - Bicarbonate; level: Low normal        On supplement: No    # Green urine:   -Likely 2/2 methylene blue and propofol from 11/2  "with GEORGETTE.    -U/A not concerning for infection so unlikely 2/2 pseudomonas    # Transplant History:  Tx: Liver Tx  Transplant: 11/2/2022 (Liver)  Significant changes in immunosuppression: None  Significant transplant-related complications: None    Recommendations were communicated to the primary team verbally.    Tiago Diaz MD  Pager: [phone]    REASON FOR CONSULT   GEORGETTE, hypervolemia post liver transplant    History of Present Illness   Susan Puckett is a 52 year old female with PMH of obesity s/p Faustino en Y, HTN, pancreatic insufficiency on Creon, CUETO cirrhosis complicated by hepatic encephalopathy, esophageal varices, hepatic hydrothorax, baseline Scr 0.7-0.9 now s/p liver transplant 11/2/22 for whom transplant nephrology was consulted for assistance with management of non oliguric GEORGETTE.     The patient has a galloway in place with green urine. She was give a dose of lasix 40mg IV x1 yesterday with 1.23L out. She states that her pain is not controlled adequately. She complains of 8/10 pain over her incision, non radiating, stabbing, continuous, not controlled with pain meds, made worse with movement. The patient was happy to hear that she did not need dialysis today. She denies N/V/D, fever, chills, does have some SOB with laying down, denies chest pain. She complains of being \"very swollen everywhere\".     Review of Systems    The 10 point Review of Systems is negative other than noted in the HPI or here.     Past Medical History    I have reviewed this patient's medical history and updated it with pertinent information if needed.   Past Medical History:   Diagnosis Date     Anemia      Anxiety      Cold sore      Depressive disorder      Diabetes (H)     Type 2 DM/No Insulin      Encephalopathy      Esophageal varices without bleeding (H)     grade I     History of blood transfusion     10/2019     History of seizure     diabetic seizure     Insomnia      Liver cirrhosis secondary to CUETO (H) 05/28/2020     " Migraine      Pleural effusion      Thrombocytopenia (H)      Thyroid disease        Past Surgical History   I have reviewed this patient's surgical history and updated it with pertinent information if needed.  Past Surgical History:   Procedure Laterality Date     APPENDECTOMY           BENCH LIVER N/A 2022    Procedure: Bench liver;  Surgeon: Delbert Morgan MD;  Location: [facility] OR     COLONOSCOPY      2020 at [facility]      ENT SURGERY      tempanoplasty     GI SURGERY      EGD 2020 at [facility]      GYN SURGERY      laparoscopic ablation     IR TRANSVEN INTRAHEPATIC PORTOSYST SHUNT  2021     MAMMOPLASTY REDUCTION BILATERAL       NV STAB PHELBECTOMY VARICOSE VEINS, LESS THAN 10 INCISIONS, ONE EXTREMITY       RNY gastric bypass  2006    retoocolic, retrogastric, [facility]     TONSILLECTOMY & ADENOIDECTOMY Bilateral      TRANSPLANT LIVER RECIPIENT  DONOR N/A 2022    Procedure: TRANSPLANT, LIVER, RECIPIENT,  DONOR;  Surgeon: Delbert Morgan MD;  Location: [facility] OR     WISDOM TEETH EXTRACTION         Family History   I have reviewed this patient's family history and updated it with pertinent information if needed.   Family History   Problem Relation Age of Onset     Anesthesia Reaction Nephew         PONV     Bleeding Disorder No family hx of      Clotting Disorder No family hx of        Social History   I have reviewed this patient's social history and updated it with pertinent information if needed. Susan MARTIN Italo  reports that she has never smoked. She has never used smokeless tobacco. She reports that she does not currently use alcohol. She reports that she does not use drugs.    Allergies   Allergies   Allergen Reactions     Methylprednisolone Hives     Per patient, reaction occurred in  or earlier. Broke out in round, flat hives in neck and chest area. No shortness of breath or swelling. Unknown if reaction occurred immediately after  "administration.      Droperidol Anxiety     Nsaids Other (See Comments)     GI bleed.     Prednisone Anxiety, Hives and Rash     Prior to Admission Medications     albumin human  12.5 g Intravenous BID     amylase-lipase-protease  2 capsule Oral TID w/meals     aspirin  81 mg Oral Daily     escitalopram  10 mg Oral or Feeding Tube Daily     furosemide  80 mg Intravenous BID     insulin aspart  1-7 Units Subcutaneous TID AC     insulin aspart  1-5 Units Subcutaneous At Bedtime     insulin glargine  5 Units Subcutaneous QAM AC     levothyroxine  150 mcg Oral or Feeding Tube QAM AC     [START ON 2022] lidocaine  2-3 patch Transdermal Q24H     lidocaine   Transdermal Q8H JULIEN     methocarbamol  500 mg Oral 4x Daily     mycophenolate  750 mg Oral BID IS     nystatin  500,000 Units Oral 4x Daily     pantoprazole  40 mg Oral Daily     polyethylene glycol  17 g Oral Daily     [START ON 2022] predniSONE  25 mg Oral Once    Followed by     [START ON 2022] predniSONE  10 mg Oral Once     sennosides  2 tablet Oral or Feeding Tube BID     sodium chloride (PF)  3 mL Intravenous Q8H     sulfamethoxazole-trimethoprim  1 tablet Oral or Feeding Tube Daily     tacrolimus  6 mg Oral BID IS     topiramate  50 mg Oral or Feeding Tube Daily     traZODone  50 mg Oral or Feeding Tube At Bedtime     [START ON 2022] valGANciclovir  450 mg Oral Daily       dextrose         Physical Exam   Temp  Av.7  F (37.1  C)  Min: 97.6  F (36.4  C)  Max: 99.8  F (37.7  C)  Arterial Line BP  Min: 68/53  Max: 147/87  Arterial Line MAP (mmHg)  Av.2 mmHg  Min: 60 mmHg  Max: 111 mmHg      Pulse  Av.3  Min: 55  Max: 111 Resp  Avg: 15.9  Min: 0  Max: 23  SpO2  Av.6 %  Min: 90 %  Max: 100 %     /77 (BP Location: Right arm)   Pulse 99   Temp 98.4  F (36.9  C) (Oral)   Resp 16   Ht 1.676 m (5' 6\")   Wt 95.5 kg (210 lb 9.6 oz)   SpO2 98%   BMI 33.99 kg/m     Date 22 0700 - 22 0659   Shift 5135-7457 " 7232-7277 0958-0899 24 Hour Total   INTAKE   Shift Total(mL/kg)       OUTPUT   Urine 300   300   Shift Total(mL/kg) 300(3.14)   300(3.14)   Weight (kg) 95.53 95.53 95.53 95.53      Admit Weight: 79.9 kg (176 lb 1.6 oz)     GENERAL APPEARANCE: alert and no distress  HENT: mouth without ulcers or lesions  LYMPHATICS: no cervical or supraclavicular nodes  RESP: lungs clear to auscultation - no rales, rhonchi or wheezes  CV: regular rhythm, normal rate, no rub, no murmur  EDEMA: 1+ LE edema bilaterally  ABDOMEN: well approximated incision, diffusely tender to palpation  MS: extremities normal - no gross deformities noted, no evidence of inflammation in joints, no muscle tenderness  SKIN: no rash  NEURO: normal strength and tone, sensory exam grossly normal, mentation intact and speech normal  PSYCH: mentation appears normal and affect normal/bright  : galloway in place draining green urine  DIALYSIS ACCESS: none    Data   CMP  Recent Labs   Lab 11/05/22  1006 11/05/22  0600 11/05/22  0121 11/04/22  2205 11/04/22  0612 11/04/22  0607 11/04/22  0404 11/04/22  0400 11/03/22  1210 11/03/22  1207 11/03/22  1019 11/03/22  0850 11/03/22  0452 11/03/22  0358 11/02/22  0955 11/01/22  2038   NA  --  138  --   --   --  141  --   --   --  139  --  140  --  143   < > 149*   POTASSIUM  --  4.0  --   --   --  4.1  --   --   --  4.0  --  4.3  --  3.9   < > 4.1   CHLORIDE  --  102  --   --   --  106  --   --   --  105  --  107  --  107   < > 117*   CO2  --  21*  --   --   --  23  --   --   --  25  --  24  --  24   < > 17*   ANIONGAP  --  15  --   --   --  12  --   --   --  9  --  9  --  12   < > 15   * 225* 224* 226*   < > 105*   < >  --    < > 122*   < > 126*   < > 149*   < > 130*   BUN  --  46.6*  --   --   --  38.2*  --   --   --  22.6*  --  18.4  --  17.1   < > 9.9   CR  --  1.75*  --   --   --  1.52*  --   --   --  1.02*  --  0.94  --  0.87   < > 0.93   GFRESTIMATED  --  35*  --   --   --  41*  --   --   --  67  --  74  --   81   < > 75   MAURI  --  8.0*  --   --   --  7.9*  --   --   --  8.3*  --  8.4*  --  8.8   < > 8.6   MAG  --  2.3  --   --   --   --   --  2.3  --   --   --   --   --  2.0  --  1.7   PHOS  --  6.2*  --   --   --  6.8*  --   --   --   --   --   --   --  4.6*  --  3.3   PROTTOTAL  --  5.4*  --   --   --  4.7*  --   --   --  4.9*  --  4.7*  --  4.9*   < > 5.7*   ALBUMIN  --  3.5  --   --   --  3.0*  --   --   --  3.0*  --  2.9*  --  3.2*   < > 2.7*   BILITOTAL  --  0.5  --   --   --  0.6  --   --   --  1.0  --  1.0  --  1.2   < > 1.5*   ALKPHOS  --  62  --   --   --  39  --   --   --  51  --  48  --  48   < > 86   AST  --  91*  --   --   --  186*  --   --   --  320*  --  347*  --  479*   < > 46*   ALT  --  207*  --   --   --  245*  --   --   --  322*  --  329*  --  371*   < > 7*    < > = values in this interval not displayed.     CBC  Recent Labs   Lab 11/05/22  0600 11/04/22  0214 11/03/22  1654 11/03/22  1207   HGB 9.1* 8.5* 8.6* 9.6*   WBC 5.3 5.6 6.5 8.7   RBC 2.91* 2.66* 2.75* 3.04*   HCT 28.6* 25.2* 25.8* 28.1*   MCV 98 95 94 92   MCH 31.3 32.0 31.3 31.6   MCHC 31.8 33.7 33.3 34.2   RDW 17.0* 17.5* 17.8* 17.6*   PLT 71* 74* 83* 100*     INR  Recent Labs   Lab 11/04/22  0607 11/03/22  0850 11/03/22  0358 11/02/22  2344 11/02/22  1815 11/02/22  1625 11/02/22  1450 11/02/22  1322 11/02/22  1205   INR 1.32* 1.33* 1.43* 1.58*   < > 1.94* 2.79* 2.75* 2.37*   PTT  --   --   --   --   --  40* 65* 83* 46*    < > = values in this interval not displayed.     ABG  Recent Labs   Lab 11/02/22  2339 11/02/22  1723 11/02/22  1434 11/02/22  1419   PH 7.43 7.32* 7.32* 7.31*   PCO2 39 42 38 38   PO2 108* 101 136* 136*   HCO3 26 21 20* 19*   O2PER 0 6 100.0 100.0      Urine Studies  Recent Labs   Lab Test 11/05/22  0928 11/01/22  2133 07/09/22  1506 02/15/22  1027   COLOR Light Yellow Yellow Straw Light Yellow   APPEARANCE Clear Clear Clear Slightly Cloudy*   URINEGLC Negative Negative >=1000* Negative   URINEBILI Negative Negative  Negative Negative   URINEKETONE 10* Negative Negative Negative   SG 1.026 1.015 1.011 1.007   UBLD Negative Negative Negative Negative   URINEPH 5.5 7.0 5.0 8.0*   PROTEIN 20* Negative Negative Negative   NITRITE Negative Negative Negative Negative   LEUKEST Negative Negative Negative Negative   RBCU 1 1 0 0   WBCU 3 1 0 0     No lab results found.  PTH  No lab results found.  Iron Studies  Recent Labs   Lab Test 09/15/22  0735 11/25/20  0639   IRON 58 94   * 162*   IRONSAT 30 58*       IMAGING:  All imaging studies reviewed by me.

## 2022-11-06 ENCOUNTER — APPOINTMENT (OUTPATIENT)
Dept: ULTRASOUND IMAGING | Facility: CLINIC | Age: 52
DRG: 005 | End: 2022-11-06
Attending: PHYSICIAN ASSISTANT
Payer: COMMERCIAL

## 2022-11-06 LAB
ALBUMIN SERPL BCG-MCNC: 3.1 G/DL (ref 3.5–5.2)
ALP SERPL-CCNC: 94 U/L (ref 35–104)
ALT SERPL W P-5'-P-CCNC: 159 U/L (ref 10–35)
ANION GAP SERPL CALCULATED.3IONS-SCNC: 14 MMOL/L (ref 7–15)
AST SERPL W P-5'-P-CCNC: 70 U/L (ref 10–35)
BASOPHILS # BLD AUTO: 0 10E3/UL (ref 0–0.2)
BASOPHILS NFR BLD AUTO: 0 %
BILIRUB DIRECT SERPL-MCNC: 0.33 MG/DL (ref 0–0.3)
BILIRUB SERPL-MCNC: 0.7 MG/DL
BUN SERPL-MCNC: 30.2 MG/DL (ref 6–20)
CALCIUM SERPL-MCNC: 7.5 MG/DL (ref 8.6–10)
CHLORIDE SERPL-SCNC: 100 MMOL/L (ref 98–107)
CREAT SERPL-MCNC: 1.25 MG/DL (ref 0.51–0.95)
DEPRECATED HCO3 PLAS-SCNC: 22 MMOL/L (ref 22–29)
EOSINOPHIL # BLD AUTO: 0.3 10E3/UL (ref 0–0.7)
EOSINOPHIL NFR BLD AUTO: 6 %
ERYTHROCYTE [DISTWIDTH] IN BLOOD BY AUTOMATED COUNT: 15.9 % (ref 10–15)
GFR SERPL CREATININE-BSD FRML MDRD: 52 ML/MIN/1.73M2
GLUCOSE BLDC GLUCOMTR-MCNC: 199 MG/DL (ref 70–99)
GLUCOSE BLDC GLUCOMTR-MCNC: 214 MG/DL (ref 70–99)
GLUCOSE BLDC GLUCOMTR-MCNC: 236 MG/DL (ref 70–99)
GLUCOSE BLDC GLUCOMTR-MCNC: 284 MG/DL (ref 70–99)
GLUCOSE BLDC GLUCOMTR-MCNC: 292 MG/DL (ref 70–99)
GLUCOSE BLDC GLUCOMTR-MCNC: 335 MG/DL (ref 70–99)
GLUCOSE BLDC GLUCOMTR-MCNC: 362 MG/DL (ref 70–99)
GLUCOSE SERPL-MCNC: 211 MG/DL (ref 70–99)
HCT VFR BLD AUTO: 27.1 % (ref 35–47)
HGB BLD-MCNC: 9.1 G/DL (ref 11.7–15.7)
IMM GRANULOCYTES # BLD: 0 10E3/UL
IMM GRANULOCYTES NFR BLD: 0 %
LYMPHOCYTES # BLD AUTO: 0.5 10E3/UL (ref 0.8–5.3)
LYMPHOCYTES NFR BLD AUTO: 11 %
MAGNESIUM SERPL-MCNC: 1.6 MG/DL (ref 1.7–2.3)
MCH RBC QN AUTO: 31.8 PG (ref 26.5–33)
MCHC RBC AUTO-ENTMCNC: 33.6 G/DL (ref 31.5–36.5)
MCV RBC AUTO: 95 FL (ref 78–100)
MONOCYTES # BLD AUTO: 0.2 10E3/UL (ref 0–1.3)
MONOCYTES NFR BLD AUTO: 5 %
NEUTROPHILS # BLD AUTO: 3.6 10E3/UL (ref 1.6–8.3)
NEUTROPHILS NFR BLD AUTO: 78 %
NRBC # BLD AUTO: 0 10E3/UL
NRBC BLD AUTO-RTO: 0 /100
PHOSPHATE SERPL-MCNC: 3 MG/DL (ref 2.5–4.5)
PLATELET # BLD AUTO: 69 10E3/UL (ref 150–450)
POTASSIUM SERPL-SCNC: 3.2 MMOL/L (ref 3.4–5.3)
PROT SERPL-MCNC: 5 G/DL (ref 6.4–8.3)
RBC # BLD AUTO: 2.86 10E6/UL (ref 3.8–5.2)
SODIUM SERPL-SCNC: 136 MMOL/L (ref 136–145)
SODIUM UR-SCNC: 94 MMOL/L
WBC # BLD AUTO: 4.5 10E3/UL (ref 4–11)

## 2022-11-06 PROCEDURE — 250N000012 HC RX MED GY IP 250 OP 636 PS 637: Performed by: PHYSICIAN ASSISTANT

## 2022-11-06 PROCEDURE — 250N000013 HC RX MED GY IP 250 OP 250 PS 637: Performed by: PHYSICIAN ASSISTANT

## 2022-11-06 PROCEDURE — 250N000012 HC RX MED GY IP 250 OP 636 PS 637: Performed by: NURSE PRACTITIONER

## 2022-11-06 PROCEDURE — 250N000013 HC RX MED GY IP 250 OP 250 PS 637: Performed by: NURSE PRACTITIONER

## 2022-11-06 PROCEDURE — 120N000011 HC R&B TRANSPLANT UMMC

## 2022-11-06 PROCEDURE — 85025 COMPLETE CBC W/AUTO DIFF WBC: CPT | Performed by: NURSE PRACTITIONER

## 2022-11-06 PROCEDURE — 250N000013 HC RX MED GY IP 250 OP 250 PS 637: Performed by: SURGERY

## 2022-11-06 PROCEDURE — 250N000011 HC RX IP 250 OP 636: Performed by: PHYSICIAN ASSISTANT

## 2022-11-06 PROCEDURE — 83735 ASSAY OF MAGNESIUM: CPT | Performed by: TRANSPLANT SURGERY

## 2022-11-06 PROCEDURE — 93971 EXTREMITY STUDY: CPT | Mod: RT

## 2022-11-06 PROCEDURE — 99233 SBSQ HOSP IP/OBS HIGH 50: CPT | Mod: 24 | Performed by: INTERNAL MEDICINE

## 2022-11-06 PROCEDURE — 36415 COLL VENOUS BLD VENIPUNCTURE: CPT | Performed by: NURSE PRACTITIONER

## 2022-11-06 PROCEDURE — 250N000013 HC RX MED GY IP 250 OP 250 PS 637: Performed by: TRANSPLANT SURGERY

## 2022-11-06 PROCEDURE — 99232 SBSQ HOSP IP/OBS MODERATE 35: CPT | Mod: 24 | Performed by: TRANSPLANT SURGERY

## 2022-11-06 PROCEDURE — 80053 COMPREHEN METABOLIC PANEL: CPT | Performed by: NURSE PRACTITIONER

## 2022-11-06 PROCEDURE — 82248 BILIRUBIN DIRECT: CPT | Performed by: NURSE PRACTITIONER

## 2022-11-06 PROCEDURE — 999N000127 HC STATISTIC PERIPHERAL IV START W US GUIDANCE

## 2022-11-06 PROCEDURE — 84100 ASSAY OF PHOSPHORUS: CPT | Performed by: NURSE PRACTITIONER

## 2022-11-06 PROCEDURE — 93971 EXTREMITY STUDY: CPT | Mod: 26 | Performed by: RADIOLOGY

## 2022-11-06 RX ORDER — MAGNESIUM OXIDE 400 MG/1
400 TABLET ORAL DAILY
Status: DISCONTINUED | OUTPATIENT
Start: 2022-11-06 | End: 2022-11-07

## 2022-11-06 RX ORDER — POTASSIUM CHLORIDE 750 MG/1
40 TABLET, EXTENDED RELEASE ORAL ONCE
Status: COMPLETED | OUTPATIENT
Start: 2022-11-06 | End: 2022-11-06

## 2022-11-06 RX ADMIN — TRAZODONE HYDROCHLORIDE 50 MG: 50 TABLET ORAL at 21:43

## 2022-11-06 RX ADMIN — TOPIRAMATE 50 MG: 50 TABLET ORAL at 08:35

## 2022-11-06 RX ADMIN — INSULIN ASPART 2 UNITS: 100 INJECTION, SOLUTION INTRAVENOUS; SUBCUTANEOUS at 08:36

## 2022-11-06 RX ADMIN — LIDOCAINE PATCH 4% 3 PATCH: 40 PATCH TOPICAL at 09:50

## 2022-11-06 RX ADMIN — PREDNISONE 25 MG: 20 TABLET ORAL at 08:15

## 2022-11-06 RX ADMIN — LEVOTHYROXINE SODIUM 150 MCG: 0.05 TABLET ORAL at 08:15

## 2022-11-06 RX ADMIN — NYSTATIN 500000 UNITS: 100000 SUSPENSION ORAL at 20:10

## 2022-11-06 RX ADMIN — TACROLIMUS 6 MG: 5 CAPSULE ORAL at 17:28

## 2022-11-06 RX ADMIN — INSULIN GLARGINE 5 UNITS: 100 INJECTION, SOLUTION SUBCUTANEOUS at 08:34

## 2022-11-06 RX ADMIN — OXYCODONE HYDROCHLORIDE 5 MG: 5 TABLET ORAL at 21:43

## 2022-11-06 RX ADMIN — OXYCODONE HYDROCHLORIDE 5 MG: 5 TABLET ORAL at 05:03

## 2022-11-06 RX ADMIN — ASPIRIN 81 MG CHEWABLE TABLET 81 MG: 81 TABLET CHEWABLE at 08:15

## 2022-11-06 RX ADMIN — HYDROXYZINE HYDROCHLORIDE 50 MG: 25 TABLET, FILM COATED ORAL at 08:33

## 2022-11-06 RX ADMIN — FUROSEMIDE 80 MG: 10 INJECTION, SOLUTION INTRAMUSCULAR; INTRAVENOUS at 20:09

## 2022-11-06 RX ADMIN — HYDROXYZINE HYDROCHLORIDE 50 MG: 25 TABLET, FILM COATED ORAL at 01:21

## 2022-11-06 RX ADMIN — HYDROMORPHONE HYDROCHLORIDE 0.3 MG: 1 INJECTION, SOLUTION INTRAMUSCULAR; INTRAVENOUS; SUBCUTANEOUS at 06:10

## 2022-11-06 RX ADMIN — OXYCODONE HYDROCHLORIDE 5 MG: 5 TABLET ORAL at 00:08

## 2022-11-06 RX ADMIN — ESCITALOPRAM OXALATE 10 MG: 10 TABLET ORAL at 08:15

## 2022-11-06 RX ADMIN — MAGNESIUM OXIDE TAB 400 MG (241.3 MG ELEMENTAL MG) 400 MG: 400 (241.3 MG) TAB at 09:49

## 2022-11-06 RX ADMIN — MYCOPHENOLATE MOFETIL 750 MG: 250 CAPSULE ORAL at 08:16

## 2022-11-06 RX ADMIN — SENNOSIDES 2 TABLET: 8.6 TABLET, FILM COATED ORAL at 08:20

## 2022-11-06 RX ADMIN — INSULIN ASPART 3 UNITS: 100 INJECTION, SOLUTION INTRAVENOUS; SUBCUTANEOUS at 17:32

## 2022-11-06 RX ADMIN — METHOCARBAMOL 500 MG: 500 TABLET ORAL at 17:28

## 2022-11-06 RX ADMIN — POTASSIUM CHLORIDE 40 MEQ: 750 TABLET, EXTENDED RELEASE ORAL at 09:49

## 2022-11-06 RX ADMIN — METHOCARBAMOL 500 MG: 500 TABLET ORAL at 20:09

## 2022-11-06 RX ADMIN — METHOCARBAMOL 500 MG: 500 TABLET ORAL at 08:14

## 2022-11-06 RX ADMIN — PANCRELIPASE 2 CAPSULE: 24000; 76000; 120000 CAPSULE, DELAYED RELEASE PELLETS ORAL at 17:28

## 2022-11-06 RX ADMIN — SULFAMETHOXAZOLE AND TRIMETHOPRIM 1 TABLET: 400; 80 TABLET ORAL at 08:14

## 2022-11-06 RX ADMIN — HYDROMORPHONE HYDROCHLORIDE 0.3 MG: 1 INJECTION, SOLUTION INTRAMUSCULAR; INTRAVENOUS; SUBCUTANEOUS at 02:14

## 2022-11-06 RX ADMIN — SENNOSIDES 2 TABLET: 8.6 TABLET, FILM COATED ORAL at 20:08

## 2022-11-06 RX ADMIN — OXYCODONE HYDROCHLORIDE 5 MG: 5 TABLET ORAL at 15:48

## 2022-11-06 RX ADMIN — FUROSEMIDE 80 MG: 10 INJECTION, SOLUTION INTRAMUSCULAR; INTRAVENOUS at 09:51

## 2022-11-06 RX ADMIN — TACROLIMUS 6 MG: 5 CAPSULE ORAL at 08:16

## 2022-11-06 RX ADMIN — NYSTATIN 500000 UNITS: 100000 SUSPENSION ORAL at 15:48

## 2022-11-06 RX ADMIN — MYCOPHENOLATE MOFETIL 750 MG: 250 CAPSULE ORAL at 17:27

## 2022-11-06 RX ADMIN — POLYETHYLENE GLYCOL 3350 17 G: 17 POWDER, FOR SOLUTION ORAL at 08:15

## 2022-11-06 RX ADMIN — INSULIN ASPART 2 UNITS: 100 INJECTION, SOLUTION INTRAVENOUS; SUBCUTANEOUS at 13:24

## 2022-11-06 RX ADMIN — PANCRELIPASE 2 CAPSULE: 24000; 76000; 120000 CAPSULE, DELAYED RELEASE PELLETS ORAL at 08:16

## 2022-11-06 RX ADMIN — NYSTATIN 500000 UNITS: 100000 SUSPENSION ORAL at 08:15

## 2022-11-06 RX ADMIN — VALGANCICLOVIR 450 MG: 450 TABLET, FILM COATED ORAL at 08:16

## 2022-11-06 RX ADMIN — PANTOPRAZOLE SODIUM 40 MG: 40 TABLET, DELAYED RELEASE ORAL at 08:15

## 2022-11-06 RX ADMIN — METHOCARBAMOL 500 MG: 500 TABLET ORAL at 12:17

## 2022-11-06 RX ADMIN — OXYCODONE HYDROCHLORIDE 5 MG: 5 TABLET ORAL at 10:16

## 2022-11-06 RX ADMIN — NYSTATIN 500000 UNITS: 100000 SUSPENSION ORAL at 12:17

## 2022-11-06 ASSESSMENT — ACTIVITIES OF DAILY LIVING (ADL)
ADLS_ACUITY_SCORE: 38
ADLS_ACUITY_SCORE: 37
ADLS_ACUITY_SCORE: 38
ADLS_ACUITY_SCORE: 37
ADLS_ACUITY_SCORE: 38

## 2022-11-06 NOTE — PROGRESS NOTES
[facility]  Transplant Nephrology Follow Up Note  Date of Admission:  11/1/2022  Today's Date: 11/06/2022  Requesting physician: Delbert Morgan MD    Recommendations:  -Continue IV lasix 80mg BID today  -Replete K and Mg  -Stop albumin  -Consider RLE U/S to rule out DVT    Transplant nephrology will sign off. Please page with questions/concerns    Assessment & Plan   # GEORGETTE: Trend down in Scr. GEORGETTE was likely hemodynamic with U/A showing only hyaline casts. Renal U/S 11/5 normal   - Baseline Creatinine: ~ 0.7-0.9   - Proteinuria: Not checked recently   - Kidney Biopsy: No     # Liver Tx:  Downtrending LFTs. Management per transplant surgery    # Immunosuppression: Tacrolimus immediate release (goal 8-12), Mycophenolate mofetil (dose 750 mg every 12 hours) and Prednisone (dose taper)   - Changes: Not at this time. Management per transplant surgery    # Infection Prophylaxis:   - PJP: Sulfa/TMP (Bactrim)  - CMV: Valganciclovir (Valcyte)  - Thrush: Nystatin (Mycostatin) swish and swallow    # Hypertension: Controlled;  Goal BP: < 140/90 (Hospitalization goal)   - Volume status: Total body volume up, but intravascularly hypovolemic     - Changes: Yes - continue lasix 80mg IV BID, stop albumin    # Pancreatic insufficiency:    -Continue Creon    # Elevated Blood Glucose: Glucose generally running ~ 200   - Management as per primary team.    # Anemia of acute blood loss: Hgb: Stable      KLAUS: No   - Iron studies: Not checked recently    # R leg pain:   -Recommend RLE U/S to rule out DVT    # Mineral Bone Disorder:   - Calcium; level: Normal        On supplement: No  - Phosphorus; level: High and trending up        On binder: No    # Electrolytes:   - Potassium; level: Low        On supplement: No, replete  - Magnesium; level: Low normal        On supplement: No, replete  - Bicarbonate; level: Low normal        On supplement: No    # Green urine:   -Improving with improving  kidney function. Likely 2/2 methylene blue and propofol from 11/2 with GEORGETTE.    -U/A not concerning for infection so unlikely 2/2 pseudomonas    # Transplant History:  Tx: Liver Tx  Transplant: 11/2/2022 (Liver)  Significant changes in immunosuppression: None  Significant transplant-related complications: None    Recommendations were communicated to the primary team verbally.    Tiago Diaz MD  Pager: [phone]    Interval events:  Ms. Puckett's creatinine is 1.25 (11/06 0618); Trend down.  Increasing urine output with lasix  Other significant labs/tests/vitals: Scr decreasing, LFTs decreasing.  No events overnight.  No chest pain or shortness of breath.  Improving leg swelling.  No nausea and vomiting.  Bowel movements are normal.  No fever, sweats or chills.    Review of Systems    The 4 point Review of Systems is negative other than noted above or here.     Past Medical History    I have reviewed this patient's medical history and updated it with pertinent information if needed.   Past Medical History:   Diagnosis Date     Anemia      Anxiety      Cold sore      Depressive disorder      Diabetes (H)     Type 2 DM/No Insulin      Encephalopathy      Esophageal varices without bleeding (H)     grade I     History of blood transfusion     10/2019     History of seizure     diabetic seizure     Insomnia      Liver cirrhosis secondary to CUETO (H) 05/28/2020     Migraine      Pleural effusion      Thrombocytopenia (H)      Thyroid disease        Past Surgical History   I have reviewed this patient's surgical history and updated it with pertinent information if needed.  Past Surgical History:   Procedure Laterality Date     APPENDECTOMY      1989     BENCH LIVER N/A 11/2/2022    Procedure: Bench liver;  Surgeon: Delbert Morgan MD;  Location: [facility] OR     COLONOSCOPY      1/2020 at [facility]      ENT SURGERY  1998    tempanoplasty     GI SURGERY      EGD August 2020 at [facility]      GYN SURGERY  2010    laparoscopic  ablation     IR TRANSVEN INTRAHEPATIC PORTOSYST SHUNT  2021     MAMMOPLASTY REDUCTION BILATERAL       NC STAB PHELBECTOMY VARICOSE VEINS, LESS THAN 10 INCISIONS, ONE EXTREMITY       RNY gastric bypass  2006    retoocolic, retrogastric, [facility]     TONSILLECTOMY & ADENOIDECTOMY Bilateral      TRANSPLANT LIVER RECIPIENT  DONOR N/A 2022    Procedure: TRANSPLANT, LIVER, RECIPIENT,  DONOR;  Surgeon: Delbert Morgan MD;  Location: [facility] OR     WISDOM TEETH EXTRACTION         Family History   I have reviewed this patient's family history and updated it with pertinent information if needed.   Family History   Problem Relation Age of Onset     Anesthesia Reaction Nephew         PONV     Bleeding Disorder No family hx of      Clotting Disorder No family hx of        Social History   I have reviewed this patient's social history and updated it with pertinent information if needed. Susan Puckett  reports that she has never smoked. She has never used smokeless tobacco. She reports that she does not currently use alcohol. She reports that she does not use drugs.    Allergies   Allergies   Allergen Reactions     Methylprednisolone Hives     Per patient, reaction occurred in  or earlier. Broke out in round, flat hives in neck and chest area. No shortness of breath or swelling. Unknown if reaction occurred immediately after administration.      Droperidol Anxiety     Nsaids Other (See Comments)     GI bleed.     Prednisone Anxiety, Hives and Rash     Prior to Admission Medications     amylase-lipase-protease  2 capsule Oral TID w/meals     aspirin  81 mg Oral Daily     escitalopram  10 mg Oral or Feeding Tube Daily     furosemide  80 mg Intravenous BID     insulin aspart  1-7 Units Subcutaneous TID AC     insulin aspart  1-5 Units Subcutaneous At Bedtime     [START ON 2022] insulin glargine  10 Units Subcutaneous QAM AC     levothyroxine  150 mcg Oral or Feeding Tube QAM AC      "lidocaine  2-3 patch Transdermal Q24H     lidocaine   Transdermal Q8H [facility]     magnesium oxide  400 mg Oral Daily     methocarbamol  500 mg Oral 4x Daily     mycophenolate  750 mg Oral BID IS     nystatin  500,000 Units Oral 4x Daily     pantoprazole  40 mg Oral Daily     polyethylene glycol  17 g Oral Daily     [START ON 2022] predniSONE  10 mg Oral Once     sennosides  2 tablet Oral or Feeding Tube BID     sodium chloride (PF)  3 mL Intravenous Q8H     sulfamethoxazole-trimethoprim  1 tablet Oral or Feeding Tube Daily     tacrolimus  6 mg Oral BID IS     topiramate  50 mg Oral or Feeding Tube Daily     traZODone  50 mg Oral or Feeding Tube At Bedtime     valGANciclovir  450 mg Oral Daily       dextrose         Physical Exam   Temp  Av.7  F (37.1  C)  Min: 97.6  F (36.4  C)  Max: 99.8  F (37.7  C)  Arterial Line BP  Min: 68/53  Max: 147/87  Arterial Line MAP (mmHg)  Av.2 mmHg  Min: 60 mmHg  Max: 111 mmHg      Pulse  Av.3  Min: 55  Max: 111 Resp  Avg: 15.9  Min: 0  Max: 23  SpO2  Av.6 %  Min: 90 %  Max: 100 %     /80 (BP Location: Right arm)   Pulse 98   Temp 98.9  F (37.2  C) (Oral)   Resp 16   Ht 1.676 m (5' 6\")   Wt 92.6 kg (204 lb 1.6 oz)   SpO2 98%   BMI 32.94 kg/m     Date 22 0700 - 22 0659   Shift 6602-3657 0861-7241 6220-2540 24 Hour Total   INTAKE   Shift Total(mL/kg)       OUTPUT   Urine 300   300   Shift Total(mL/kg) 300(3.14)   300(3.14)   Weight (kg) 95.53 95.53 95.53 95.53      Admit Weight: 79.9 kg (176 lb 1.6 oz)     GENERAL APPEARANCE: alert and no distress  HENT: mouth without ulcers or lesions  LYMPHATICS: no cervical or supraclavicular nodes  RESP: lungs clear to auscultation - no rales, rhonchi or wheezes  CV: regular rhythm, normal rate, no rub, no murmur  EDEMA: 1+ LE edema bilaterally  ABDOMEN: well approximated incision, diffusely tender to palpation  MS: extremities normal - no gross deformities noted, no evidence of inflammation in joints, " no muscle tenderness  SKIN: no rash  NEURO: normal strength and tone, sensory exam grossly normal, mentation intact and speech normal  PSYCH: mentation appears normal and affect normal/bright  : galloway in place draining green urine  DIALYSIS ACCESS: none    Data   CMP  Recent Labs   Lab 11/06/22  0756 11/06/22  0618 11/06/22  0122 11/05/22  2212 11/05/22  1006 11/05/22  0600 11/04/22  0612 11/04/22  0607 11/04/22  0404 11/04/22  0400 11/03/22  1210 11/03/22  1207 11/03/22  0452 11/03/22  0358   NA  --  136  --   --   --  138  --  141  --   --   --  139   < > 143   POTASSIUM  --  3.2*  --   --   --  4.0  --  4.1  --   --   --  4.0   < > 3.9   CHLORIDE  --  100  --   --   --  102  --  106  --   --   --  105   < > 107   CO2  --  22  --   --   --  21*  --  23  --   --   --  25   < > 24   ANIONGAP  --  14  --   --   --  15  --  12  --   --   --  9   < > 12   * 211* 236* 220*   < > 225*   < > 105*   < >  --    < > 122*   < > 149*   BUN  --  30.2*  --   --   --  46.6*  --  38.2*  --   --   --  22.6*   < > 17.1   CR  --  1.25*  --   --   --  1.75*  --  1.52*  --   --   --  1.02*   < > 0.87   GFRESTIMATED  --  52*  --   --   --  35*  --  41*  --   --   --  67   < > 81   MAURI  --  7.5*  --   --   --  8.0*  --  7.9*  --   --   --  8.3*   < > 8.8   MAG  --  1.6*  --   --   --  2.3  --   --   --  2.3  --   --   --  2.0   PHOS  --  3.0  --   --   --  6.2*  --  6.8*  --   --   --   --   --  4.6*   PROTTOTAL  --  5.0*  --   --   --  5.4*  --  4.7*  --   --   --  4.9*   < > 4.9*   ALBUMIN  --  3.1*  --   --   --  3.5  --  3.0*  --   --   --  3.0*   < > 3.2*   BILITOTAL  --  0.7  --   --   --  0.5  --  0.6  --   --   --  1.0   < > 1.2   ALKPHOS  --  94  --   --   --  62  --  39  --   --   --  51   < > 48   AST  --  70*  --   --   --  91*  --  186*  --   --   --  320*   < > 479*   ALT  --  159*  --   --   --  207*  --  245*  --   --   --  322*   < > 371*    < > = values in this interval not displayed.     CBC  Recent Labs   Lab  11/06/22  0618 11/05/22  0600 11/04/22  0214 11/03/22  1654   HGB 9.1* 9.1* 8.5* 8.6*   WBC 4.5 5.3 5.6 6.5   RBC 2.86* 2.91* 2.66* 2.75*   HCT 27.1* 28.6* 25.2* 25.8*   MCV 95 98 95 94   MCH 31.8 31.3 32.0 31.3   MCHC 33.6 31.8 33.7 33.3   RDW 15.9* 17.0* 17.5* 17.8*   PLT 69* 71* 74* 83*     INR  Recent Labs   Lab 11/04/22  0607 11/03/22  0850 11/03/22  0358 11/02/22  2344 11/02/22  1815 11/02/22  1625 11/02/22  1450 11/02/22  1322 11/02/22  1205   INR 1.32* 1.33* 1.43* 1.58*   < > 1.94* 2.79* 2.75* 2.37*   PTT  --   --   --   --   --  40* 65* 83* 46*    < > = values in this interval not displayed.     ABG  Recent Labs   Lab 11/02/22  2339 11/02/22  1723 11/02/22  1434 11/02/22  1419   PH 7.43 7.32* 7.32* 7.31*   PCO2 39 42 38 38   PO2 108* 101 136* 136*   HCO3 26 21 20* 19*   O2PER 0 6 100.0 100.0      Urine Studies  Recent Labs   Lab Test 11/05/22  0928 11/01/22  2133 07/09/22  1506 02/15/22  1027   COLOR Light Yellow Yellow Straw Light Yellow   APPEARANCE Clear Clear Clear Slightly Cloudy*   URINEGLC Negative Negative >=1000* Negative   URINEBILI Negative Negative Negative Negative   URINEKETONE 10* Negative Negative Negative   SG 1.026 1.015 1.011 1.007   UBLD Negative Negative Negative Negative   URINEPH 5.5 7.0 5.0 8.0*   PROTEIN 20* Negative Negative Negative   NITRITE Negative Negative Negative Negative   LEUKEST Negative Negative Negative Negative   RBCU 1 1 0 0   WBCU 3 1 0 0     No lab results found.  PTH  No lab results found.  Iron Studies  Recent Labs   Lab Test 09/15/22  0735 11/25/20  0639   IRON 58 94   * 162*   IRONSAT 30 58*       IMAGING:  All imaging studies reviewed by me.

## 2022-11-06 NOTE — PLAN OF CARE
"Vitals: /80 (BP Location: Right arm)   Pulse 98   Temp 98.9  F (37.2  C) (Oral)   Resp 16   Ht 1.676 m (5' 6\")   Wt 92.6 kg (204 lb 1.6 oz)   SpO2 98%   BMI 32.94 kg/m  '  Endocrine: Glucose 198, Novolog correction, increased Lantus dose.  Labs: Stable labs, Magnesium and Potassium replaced.  Pain: Moderate abdominal and back pain. Lethargic at times, can't keep her eyes open after Robaxin. Lidocaine patches to back and abdomen.  PRN's: Oxycodone 5 mg.  Diet: Regular diet, encourage intake.  LDA: Central line discontinued, PIV placed, saline locked.   GI: One BM.   : Good urine output, galloway DC'd, void of 500cc, bladder scan for minimal.  Skin: Abdominal incision with staples, old TERESITA site with small drainage.  Neuro: Anxious at times, oriented. Family in visiting this afternoon,  Mobility: Ambulated around the unit, in the chair.  Education: Medication card review today.  Plan: Increase activity, education. LE ultrasound is pending.                          "

## 2022-11-06 NOTE — PLAN OF CARE
Patient's Name: Susan Puckett    Procedure Date: 11/06/22  Procedure Time 1300    Procedure Performed: Central line removal     The Central Venous Catheter (CVC) was removed per provider order.     The central venous catheter was located: Right Internal Jugular vein, with a total of 3   lumens.     The patient was placed in Trendelenburg position with Insertion site lower than heart.     Valsalva response achieved by: Patient instructed to take a deep breath and bear down while the CVC was removed with a constant steady motion.     Firm pressure was applied at the access site for 15   minutes until bleeding stopped.     Post removal site assessment; bleeding. Occlusive dressing applied: Yes    CVC tip was inspected and intact: Yes    Tip was sent to lab for culture per provider order: No    Patient tolerated the procedure: well    2nd RN present during removal (if applicable) Sultana VANCE RN   11/6/2022   1:13 PM

## 2022-11-06 NOTE — PROGRESS NOTES
Transplant Surgery  Inpatient Daily Progress Note  11/06/2022    Assessment & Plan: Susan Puckett is a 52 year old female with PMH significant obesity s/p Faustino-en-Y, DM, HTN, CUETO cirrhosis c/b HE, EV, Hepatic hydrothorax. She is now s/p OLTx 11/2 with Dr. Delbert Morgan.    Graft function:  Liver transplant: POD # 4. Liver transaminases trending down, INR 1.3. JPs: removed lateral drain on 11/4, removed last drain on 11/5.   Graft patency ppx: ASA 81 daily  Liver US: POD #0, Patent doppler, Elevated RI=1 in HA  Repeat US POD #1, unchanged elevated RI in HA  Repeat US POD #2, continued elevated RIs, slightly improved flow in the left hepatic artery   Immunosuppression management:  Steroid taper induction  Maintenance:  Tac 6mg BID (goal 8-12)  MMF 750mg BID  Complexity of management:High. Contributing factors: induction, Flucon x2, now completed.   Hematology:   Acute blood loss/Anemia of chronic disease: Hgb stable 8-9  Thrombocytopenia: due to liver disease, Plts >60  Coagulopathy: due to liver disease, INR 1.3. 1U cryo overnight on 11/3.  Neurology:  Acute postoperative pain: Continue Oxycodone, discontinue Dilaudid IV, Added Robaxin, Atarax and lidocaine patches.  Hx of hypoglycemic seizures: continue topamax  Depression: Continue lexapro, Trazadone  Cardiorespiratory:   Postop ventilatory support: Extubated, now on room air. Encourage CDB/IS  GI/Nutrition: Advance to diet as tolerated, minimal intake. Will discuss with dietician.   Hx of Gastric bypass:  Pancreatic insufficiency: on Creon PTA, restart.   Continue bowel regimen senna and miralax  Endocrine:   Steroid induced hyperglycemia/DM: on Januvia and insulin PTA, started insulin gtt initially post operatively. Discontinued insulin gtt and started ssi prn, add Lantus 10 units daily.   Hypothyroid: continue synthroid   Fluid/Electrolytes:   MIVF: LR@100, discontined  Hypoalbuminemia: Albumin 3.1.   GEORGETTE: Cr 1.3<-1.8<-1.5<-1. Continue Lasix 80 mg IV BID.    Hypomagnesemia: Mag ox 400 mg daily  Hypokalemia: Kdur 40 mEq x1 today  : Rossi removed, continue strict I&Os   Infectious disease: Afebrile, WBC normal  Covid + 11/1: Cycle threshold =39.7, covid recovered     Prophylaxis: DVT(SCDs, GI (PPI), fungal (diflucan x1, nystatin), PCP ppx (bactrim), CMV ppx (valcyte)   Periop ppx zosyn x48hrs,   Disposition: 7A, PT/OT    Medical Decision Making: High  Subsequent visit 08354 (high level decision making)     ANYA/Fellow/Resident Provider: Amy Hansen PA-C     Faculty: Delbert Morgan MD    __________________________________________________________________  Transplant History: Admitted 11/1/2022 for Liver transplant .    11/2/2022 (Liver), Postoperative day: 4     Interval History: History is obtained from the patient  Overnight events: Pain not well controlled but patient continues to be somnolent.     ROS:   A 10-point review of systems was negative except as noted above.    Curent Meds:    amylase-lipase-protease  2 capsule Oral TID w/meals     aspirin  81 mg Oral Daily     escitalopram  10 mg Oral or Feeding Tube Daily     furosemide  80 mg Intravenous BID     insulin aspart  1-7 Units Subcutaneous TID AC     insulin aspart  1-5 Units Subcutaneous At Bedtime     [START ON 11/7/2022] insulin glargine  10 Units Subcutaneous QAM AC     levothyroxine  150 mcg Oral or Feeding Tube QAM AC     lidocaine  2-3 patch Transdermal Q24H     lidocaine   Transdermal Q8H JULIEN     magnesium oxide  400 mg Oral Daily     methocarbamol  500 mg Oral 4x Daily     mycophenolate  750 mg Oral BID IS     nystatin  500,000 Units Oral 4x Daily     pantoprazole  40 mg Oral Daily     polyethylene glycol  17 g Oral Daily     [START ON 11/7/2022] predniSONE  10 mg Oral Once     sennosides  2 tablet Oral or Feeding Tube BID     sodium chloride (PF)  3 mL Intravenous Q8H     sulfamethoxazole-trimethoprim  1 tablet Oral or Feeding Tube Daily     tacrolimus  6 mg Oral BID IS     topiramate  50 mg Oral or  "Feeding Tube Daily     traZODone  50 mg Oral or Feeding Tube At Bedtime     valGANciclovir  450 mg Oral Daily       Physical Exam:     Admit Weight: 79.9 kg (176 lb 1.6 oz)    Current Vitals:   /80 (BP Location: Right arm)   Pulse 98   Temp 98.9  F (37.2  C) (Oral)   Resp 16   Ht 1.676 m (5' 6\")   Wt 92.6 kg (204 lb 1.6 oz)   SpO2 98%   BMI 32.94 kg/m      Vital sign ranges:    Temp:  [98.2  F (36.8  C)-100.2  F (37.9  C)] 98.9  F (37.2  C)  Pulse:  [70-98] 98  Resp:  [16-18] 16  BP: (119-143)/(71-86) 123/80  SpO2:  [93 %-100 %] 98 %  Patient Vitals for the past 24 hrs:   BP Temp Temp src Pulse Resp SpO2 Weight   11/06/22 1007 123/80 98.9  F (37.2  C) Oral 98 16 98 % --   11/06/22 0746 -- -- -- -- -- -- 92.6 kg (204 lb 1.6 oz)   11/06/22 0447 (!) 143/86 98.8  F (37.1  C) Oral 72 18 96 % --   11/06/22 0122 137/80 99.7  F (37.6  C) Oral 76 18 93 % --   11/05/22 2211 133/80 100.2  F (37.9  C) Oral 71 18 97 % --   11/05/22 1805 119/71 99.5  F (37.5  C) Oral 70 18 100 % --   11/05/22 1415 134/82 98.2  F (36.8  C) Oral 78 16 98 % --     General Appearance: in no apparent distress.  Skin: normal, warm, dry, No rashes, induration, or jaundice  Heart: regular rate and rhythm,   Lungs: NLB on room air  Abdomen: soft, tender to palpation, incision with staples CDI.   : Voiding  Extremities: edema: present bilateral. 1-2+.  Neurologic: A&Ox3, somnolent. Tremor absent. Asterixis: absent.    Frailty Scores     Frailty Scores 1/6/2022 10/18/2020    Final Score Frail Not Frail    Final Score Number 3 2          Data:   CMP  Recent Labs   Lab 11/06/22  0756 11/06/22  0618 11/05/22  1006 11/05/22  0600 11/03/22  0452 11/03/22  0358 11/02/22  1625 11/02/22  1434 11/02/22  0955 11/01/22 2038   NA  --  136  --  138   < > 143   < > 142   < > 149*   POTASSIUM  --  3.2*  --  4.0   < > 3.9   < > 2.9*   < > 4.1   CHLORIDE  --  100  --  102   < > 107   < >  --   --  117*   CO2  --  22  --  21*   < > 24   < >  --   --  17* "   * 211*   < > 225*   < > 149*   < > 263*   < > 130*   BUN  --  30.2*  --  46.6*   < > 17.1   < >  --   --  9.9   CR  --  1.25*  --  1.75*   < > 0.87   < >  --   --  0.93   GFRESTIMATED  --  52*  --  35*   < > 81   < >  --   --  75   MAURI  --  7.5*  --  8.0*   < > 8.8   < >  --   --  8.6   ICAW  --   --   --   --   --  4.7  --  5.3*   < >  --    MAG  --  1.6*  --  2.3   < > 2.0  --   --   --  1.7   PHOS  --  3.0  --  6.2*   < > 4.6*  --   --   --  3.3   AMYLASE  --   --   --   --   --  65  --   --   --  45   LIPASE  --   --   --   --   --  6*  --   --   --   --    ALBUMIN  --  3.1*  --  3.5   < > 3.2*   < >  --   --  2.7*   BILITOTAL  --  0.7  --  0.5   < > 1.2   < >  --   --  1.5*   ALKPHOS  --  94  --  62   < > 48   < >  --   --  86   AST  --  70*  --  91*   < > 479*   < >  --   --  46*   ALT  --  159*  --  207*   < > 371*   < >  --   --  7*    < > = values in this interval not displayed.     CBC  Recent Labs   Lab 11/06/22  0618 11/05/22  0600 11/02/22  1625 11/02/22  1450   HGB 9.1* 9.1*   < > 9.8*   WBC 4.5 5.3   < >  --    PLT 69* 71*   < > 96*   A1C  --   --   --  5.7*    < > = values in this interval not displayed.     COAGS  Recent Labs   Lab 11/04/22  0607 11/03/22  0850 11/02/22  1815 11/02/22  1625 11/02/22  1450   INR 1.32* 1.33*   < > 1.94* 2.79*   PTT  --   --   --  40* 65*    < > = values in this interval not displayed.      Urinalysis  Recent Labs   Lab Test 11/05/22 0928 11/01/22 2133   COLOR Light Yellow Yellow   APPEARANCE Clear Clear   URINEGLC Negative Negative   URINEBILI Negative Negative   URINEKETONE 10* Negative   SG 1.026 1.015   UBLD Negative Negative   URINEPH 5.5 7.0   PROTEIN 20* Negative   NITRITE Negative Negative   LEUKEST Negative Negative   RBCU 1 1   WBCU 3 1     Virology:  Hepatitis C Antibody   Date Value Ref Range Status   11/01/2022 Nonreactive Nonreactive Final

## 2022-11-06 NOTE — PROGRESS NOTES
Calorie Count  Intake recorded for: 11/5  Total Kcals: 329 Total Protein: 8g  Kcals from Hospital Food: 329   Protein: 8g  Kcals from Outside Food (average):0 Protein: 0g  # Meals Ordered from Kitchen: 3  # Meals Recorded: 3 (First- 50% oatmeal w/ brown sugar, grapes, cranberry juice)     (Second- 100% diet [place] Mist, 50% applesauce, 25% mashed potatoes)     (Third- 50% grilled cheese sandwich)  # Supplements Recorded: 0

## 2022-11-06 NOTE — PLAN OF CARE
"BP (!) 143/86 (BP Location: Right arm)   Pulse 72   Temp 98.8  F (37.1  C) (Oral)   Resp 18   Ht 1.676 m (5' 6\")   Wt 95.5 kg (210 lb 9.6 oz)   SpO2 96%   BMI 33.99 kg/m       [id number]  Neuro:  Pt. alert & Ox4  Behavior: Pt. anxious at times, cooperative with cares.   Activity: Pt. up with assist 2.   Vital: AVSS on RA  BG: ACHS. 2am B  LDAs: Right internal jugular & PIV SL Right   Cardiac: WNL  Respiratory: LS diminished bases.  GI/: Rossi with 1,600cc output, no stools  this shift.   Skin: Incision stapled & TRACEY.  Pain/Nausea: Abdominal pain managed with prn Oxycodone, prn IV Dilaudid, prn Atarax. Pt. denies nausea  Diet: Regular with calorie counts.  Labs/Imaging:  Morning labs pending.  Education: Lab book updated  Plan: Continue to follow POC & notify MD with change in status.      Changes in shift:  Pt. c/o bilat. dull ear pain & burning in right upper leg, cross-cover notified & assessed pt. Pt. given prn Oxycodone.          "

## 2022-11-06 NOTE — PROVIDER NOTIFICATION
Surgery cross-cover paged: MATILDE Puckett on 7A, Liver  Pt. c/o bilat. inner ear pain that started tonight. I don't see any drainage or redness. I tried using the otoscope but it's not working for me. Vitals stable. Gave pt. prn Oxy. Also, pt. c/o burning in right upper leg.   Kelin Doherty #[id number]    MD assessed pt.

## 2022-11-07 ENCOUNTER — APPOINTMENT (OUTPATIENT)
Dept: PHYSICAL THERAPY | Facility: CLINIC | Age: 52
DRG: 005 | End: 2022-11-07
Attending: TRANSPLANT SURGERY
Payer: COMMERCIAL

## 2022-11-07 PROBLEM — N28.9 HYPERVOLEMIA ASSOCIATED WITH RENAL INSUFFICIENCY: Status: ACTIVE | Noted: 2022-11-07

## 2022-11-07 PROBLEM — E87.70 HYPERVOLEMIA ASSOCIATED WITH RENAL INSUFFICIENCY: Status: ACTIVE | Noted: 2022-11-07

## 2022-11-07 LAB
ALBUMIN SERPL BCG-MCNC: 3.3 G/DL (ref 3.5–5.2)
ALP SERPL-CCNC: 138 U/L (ref 35–104)
ALT SERPL W P-5'-P-CCNC: 152 U/L (ref 10–35)
ANION GAP SERPL CALCULATED.3IONS-SCNC: 15 MMOL/L (ref 7–15)
AST SERPL W P-5'-P-CCNC: 69 U/L (ref 10–35)
BASOPHILS # BLD AUTO: 0 10E3/UL (ref 0–0.2)
BASOPHILS NFR BLD AUTO: 0 %
BILIRUB DIRECT SERPL-MCNC: 0.41 MG/DL (ref 0–0.3)
BILIRUB SERPL-MCNC: 0.9 MG/DL
BUN SERPL-MCNC: 23.4 MG/DL (ref 6–20)
CALCIUM SERPL-MCNC: 7.5 MG/DL (ref 8.6–10)
CHLORIDE SERPL-SCNC: 98 MMOL/L (ref 98–107)
CREAT SERPL-MCNC: 1.06 MG/DL (ref 0.51–0.95)
DEPRECATED HCO3 PLAS-SCNC: 20 MMOL/L (ref 22–29)
EOSINOPHIL # BLD AUTO: 0.3 10E3/UL (ref 0–0.7)
EOSINOPHIL NFR BLD AUTO: 5 %
ERYTHROCYTE [DISTWIDTH] IN BLOOD BY AUTOMATED COUNT: 15.5 % (ref 10–15)
GFR SERPL CREATININE-BSD FRML MDRD: 64 ML/MIN/1.73M2
GLUCOSE BLDC GLUCOMTR-MCNC: 203 MG/DL (ref 70–99)
GLUCOSE BLDC GLUCOMTR-MCNC: 219 MG/DL (ref 70–99)
GLUCOSE BLDC GLUCOMTR-MCNC: 260 MG/DL (ref 70–99)
GLUCOSE BLDC GLUCOMTR-MCNC: 276 MG/DL (ref 70–99)
GLUCOSE BLDC GLUCOMTR-MCNC: 330 MG/DL (ref 70–99)
GLUCOSE BLDC GLUCOMTR-MCNC: 333 MG/DL (ref 70–99)
GLUCOSE BLDC GLUCOMTR-MCNC: 359 MG/DL (ref 70–99)
GLUCOSE SERPL-MCNC: 284 MG/DL (ref 70–99)
HCT VFR BLD AUTO: 31.3 % (ref 35–47)
HGB BLD-MCNC: 10.1 G/DL (ref 11.7–15.7)
IMM GRANULOCYTES # BLD: 0 10E3/UL
IMM GRANULOCYTES NFR BLD: 0 %
LYMPHOCYTES # BLD AUTO: 0.5 10E3/UL (ref 0.8–5.3)
LYMPHOCYTES NFR BLD AUTO: 8 %
MAGNESIUM SERPL-MCNC: 1.6 MG/DL (ref 1.7–2.3)
MCH RBC QN AUTO: 31 PG (ref 26.5–33)
MCHC RBC AUTO-ENTMCNC: 32.3 G/DL (ref 31.5–36.5)
MCV RBC AUTO: 96 FL (ref 78–100)
MONOCYTES # BLD AUTO: 0.2 10E3/UL (ref 0–1.3)
MONOCYTES NFR BLD AUTO: 4 %
NEUTROPHILS # BLD AUTO: 4.9 10E3/UL (ref 1.6–8.3)
NEUTROPHILS NFR BLD AUTO: 83 %
NRBC # BLD AUTO: 0 10E3/UL
NRBC BLD AUTO-RTO: 0 /100
PHOSPHATE SERPL-MCNC: 2 MG/DL (ref 2.5–4.5)
PLATELET # BLD AUTO: 69 10E3/UL (ref 150–450)
POTASSIUM SERPL-SCNC: 3.9 MMOL/L (ref 3.4–5.3)
PROT SERPL-MCNC: 5.5 G/DL (ref 6.4–8.3)
RBC # BLD AUTO: 3.26 10E6/UL (ref 3.8–5.2)
SODIUM SERPL-SCNC: 133 MMOL/L (ref 136–145)
TACROLIMUS BLD-MCNC: 7 UG/L (ref 5–15)
TME LAST DOSE: NORMAL H
TME LAST DOSE: NORMAL H
WBC # BLD AUTO: 6 10E3/UL (ref 4–11)

## 2022-11-07 PROCEDURE — 250N000012 HC RX MED GY IP 250 OP 636 PS 637: Performed by: NURSE PRACTITIONER

## 2022-11-07 PROCEDURE — 83735 ASSAY OF MAGNESIUM: CPT | Performed by: TRANSPLANT SURGERY

## 2022-11-07 PROCEDURE — 250N000013 HC RX MED GY IP 250 OP 250 PS 637: Performed by: TRANSPLANT SURGERY

## 2022-11-07 PROCEDURE — 84450 TRANSFERASE (AST) (SGOT): CPT | Performed by: NURSE PRACTITIONER

## 2022-11-07 PROCEDURE — 85025 COMPLETE CBC W/AUTO DIFF WBC: CPT | Performed by: NURSE PRACTITIONER

## 2022-11-07 PROCEDURE — 250N000013 HC RX MED GY IP 250 OP 250 PS 637: Performed by: SURGERY

## 2022-11-07 PROCEDURE — 250N000011 HC RX IP 250 OP 636: Performed by: PHYSICIAN ASSISTANT

## 2022-11-07 PROCEDURE — 120N000011 HC R&B TRANSPLANT UMMC

## 2022-11-07 PROCEDURE — 84100 ASSAY OF PHOSPHORUS: CPT | Performed by: NURSE PRACTITIONER

## 2022-11-07 PROCEDURE — 250N000013 HC RX MED GY IP 250 OP 250 PS 637: Performed by: PHYSICIAN ASSISTANT

## 2022-11-07 PROCEDURE — 36415 COLL VENOUS BLD VENIPUNCTURE: CPT | Performed by: TRANSPLANT SURGERY

## 2022-11-07 PROCEDURE — 97116 GAIT TRAINING THERAPY: CPT | Mod: GP | Performed by: REHABILITATION PRACTITIONER

## 2022-11-07 PROCEDURE — 250N000013 HC RX MED GY IP 250 OP 250 PS 637: Performed by: NURSE PRACTITIONER

## 2022-11-07 PROCEDURE — 97530 THERAPEUTIC ACTIVITIES: CPT | Mod: GP | Performed by: REHABILITATION PRACTITIONER

## 2022-11-07 PROCEDURE — 250N000011 HC RX IP 250 OP 636: Performed by: NURSE PRACTITIONER

## 2022-11-07 PROCEDURE — 250N000013 HC RX MED GY IP 250 OP 250 PS 637

## 2022-11-07 PROCEDURE — 250N000011 HC RX IP 250 OP 636

## 2022-11-07 PROCEDURE — 80197 ASSAY OF TACROLIMUS: CPT | Performed by: TRANSPLANT SURGERY

## 2022-11-07 PROCEDURE — 80053 COMPREHEN METABOLIC PANEL: CPT | Performed by: NURSE PRACTITIONER

## 2022-11-07 PROCEDURE — 250N000012 HC RX MED GY IP 250 OP 636 PS 637: Performed by: PHYSICIAN ASSISTANT

## 2022-11-07 RX ORDER — ONDANSETRON 4 MG/1
4 TABLET, ORALLY DISINTEGRATING ORAL EVERY 6 HOURS PRN
Status: DISCONTINUED | OUTPATIENT
Start: 2022-11-07 | End: 2022-11-10 | Stop reason: HOSPADM

## 2022-11-07 RX ORDER — MAGNESIUM SULFATE HEPTAHYDRATE 40 MG/ML
2 INJECTION, SOLUTION INTRAVENOUS ONCE
Status: COMPLETED | OUTPATIENT
Start: 2022-11-07 | End: 2022-11-07

## 2022-11-07 RX ORDER — PEDI MULTIVIT NO.128/VITAMIN K 500 MCG/ML
1 LIQUID (ML) ORAL DAILY
Status: DISCONTINUED | OUTPATIENT
Start: 2022-11-07 | End: 2022-11-07

## 2022-11-07 RX ORDER — LIDOCAINE 4 G/G
2 PATCH TOPICAL
Status: DISCONTINUED | OUTPATIENT
Start: 2022-11-07 | End: 2022-11-08

## 2022-11-07 RX ORDER — MAGNESIUM OXIDE 400 MG/1
400 TABLET ORAL 2 TIMES DAILY
Status: DISCONTINUED | OUTPATIENT
Start: 2022-11-07 | End: 2022-11-10 | Stop reason: HOSPADM

## 2022-11-07 RX ORDER — SIMETHICONE 80 MG
80 TABLET,CHEWABLE ORAL EVERY 6 HOURS PRN
Status: DISCONTINUED | OUTPATIENT
Start: 2022-11-07 | End: 2022-11-10 | Stop reason: HOSPADM

## 2022-11-07 RX ORDER — PEDI MULTIVIT NO.128/VITAMIN K 500 MCG/ML
1 LIQUID (ML) ORAL DAILY
Status: DISCONTINUED | OUTPATIENT
Start: 2022-11-07 | End: 2022-11-10 | Stop reason: HOSPADM

## 2022-11-07 RX ORDER — ONDANSETRON 2 MG/ML
4 INJECTION INTRAMUSCULAR; INTRAVENOUS EVERY 6 HOURS PRN
Status: DISCONTINUED | OUTPATIENT
Start: 2022-11-07 | End: 2022-11-10 | Stop reason: HOSPADM

## 2022-11-07 RX ADMIN — MAGNESIUM SULFATE IN WATER 2 G: 40 INJECTION, SOLUTION INTRAVENOUS at 12:43

## 2022-11-07 RX ADMIN — ESCITALOPRAM OXALATE 10 MG: 10 TABLET ORAL at 08:59

## 2022-11-07 RX ADMIN — TOPIRAMATE 50 MG: 50 TABLET ORAL at 08:58

## 2022-11-07 RX ADMIN — TRAZODONE HYDROCHLORIDE 50 MG: 50 TABLET ORAL at 21:33

## 2022-11-07 RX ADMIN — OXYCODONE HYDROCHLORIDE 5 MG: 5 TABLET ORAL at 05:51

## 2022-11-07 RX ADMIN — NYSTATIN 500000 UNITS: 100000 SUSPENSION ORAL at 12:42

## 2022-11-07 RX ADMIN — SENNOSIDES 2 TABLET: 8.6 TABLET, FILM COATED ORAL at 09:31

## 2022-11-07 RX ADMIN — LIDOCAINE PATCH 4% 2 PATCH: 40 PATCH TOPICAL at 12:42

## 2022-11-07 RX ADMIN — MAGNESIUM OXIDE TAB 400 MG (241.3 MG ELEMENTAL MG) 400 MG: 400 (241.3 MG) TAB at 08:59

## 2022-11-07 RX ADMIN — PANCRELIPASE 2 CAPSULE: 24000; 76000; 120000 CAPSULE, DELAYED RELEASE PELLETS ORAL at 08:57

## 2022-11-07 RX ADMIN — ASPIRIN 81 MG CHEWABLE TABLET 81 MG: 81 TABLET CHEWABLE at 08:59

## 2022-11-07 RX ADMIN — SIMETHICONE 80 MG: 80 TABLET, CHEWABLE ORAL at 22:17

## 2022-11-07 RX ADMIN — OXYCODONE HYDROCHLORIDE 5 MG: 5 TABLET ORAL at 01:47

## 2022-11-07 RX ADMIN — POLYETHYLENE GLYCOL 3350 17 G: 17 POWDER, FOR SOLUTION ORAL at 09:06

## 2022-11-07 RX ADMIN — SENNOSIDES 2 TABLET: 8.6 TABLET, FILM COATED ORAL at 20:10

## 2022-11-07 RX ADMIN — OXYCODONE HYDROCHLORIDE 5 MG: 5 TABLET ORAL at 10:09

## 2022-11-07 RX ADMIN — OXYCODONE HYDROCHLORIDE 5 MG: 5 TABLET ORAL at 22:16

## 2022-11-07 RX ADMIN — ONDANSETRON 4 MG: 2 INJECTION INTRAMUSCULAR; INTRAVENOUS at 02:27

## 2022-11-07 RX ADMIN — Medication 1 CAPSULE: at 12:47

## 2022-11-07 RX ADMIN — LIDOCAINE PATCH 4% 3 PATCH: 40 PATCH TOPICAL at 12:42

## 2022-11-07 RX ADMIN — PANCRELIPASE 2 CAPSULE: 24000; 76000; 120000 CAPSULE, DELAYED RELEASE PELLETS ORAL at 18:18

## 2022-11-07 RX ADMIN — VALGANCICLOVIR 450 MG: 450 TABLET, FILM COATED ORAL at 08:58

## 2022-11-07 RX ADMIN — PREDNISONE 10 MG: 10 TABLET ORAL at 08:59

## 2022-11-07 RX ADMIN — MYCOPHENOLATE MOFETIL 750 MG: 250 CAPSULE ORAL at 08:58

## 2022-11-07 RX ADMIN — HYDROXYZINE HYDROCHLORIDE 25 MG: 25 TABLET, FILM COATED ORAL at 21:33

## 2022-11-07 RX ADMIN — PANTOPRAZOLE SODIUM 40 MG: 40 TABLET, DELAYED RELEASE ORAL at 08:58

## 2022-11-07 RX ADMIN — METHOCARBAMOL 500 MG: 500 TABLET ORAL at 16:12

## 2022-11-07 RX ADMIN — ONDANSETRON 4 MG: 2 INJECTION INTRAMUSCULAR; INTRAVENOUS at 21:32

## 2022-11-07 RX ADMIN — HYDROXYZINE HYDROCHLORIDE 50 MG: 25 TABLET, FILM COATED ORAL at 01:46

## 2022-11-07 RX ADMIN — TACROLIMUS 6 MG: 5 CAPSULE ORAL at 08:58

## 2022-11-07 RX ADMIN — NYSTATIN 500000 UNITS: 100000 SUSPENSION ORAL at 16:13

## 2022-11-07 RX ADMIN — RIZATRIPTAN BENZOATE 10 MG: 10 TABLET ORAL at 02:54

## 2022-11-07 RX ADMIN — MYCOPHENOLATE MOFETIL 750 MG: 250 CAPSULE ORAL at 18:17

## 2022-11-07 RX ADMIN — RIZATRIPTAN BENZOATE 10 MG: 10 TABLET ORAL at 20:17

## 2022-11-07 RX ADMIN — FUROSEMIDE 80 MG: 10 INJECTION, SOLUTION INTRAMUSCULAR; INTRAVENOUS at 09:16

## 2022-11-07 RX ADMIN — METHOCARBAMOL 500 MG: 500 TABLET ORAL at 12:42

## 2022-11-07 RX ADMIN — PANCRELIPASE 2 CAPSULE: 24000; 76000; 120000 CAPSULE, DELAYED RELEASE PELLETS ORAL at 13:37

## 2022-11-07 RX ADMIN — OXYCODONE HYDROCHLORIDE 5 MG: 5 TABLET ORAL at 18:16

## 2022-11-07 RX ADMIN — HYDROXYZINE HYDROCHLORIDE 50 MG: 25 TABLET, FILM COATED ORAL at 09:31

## 2022-11-07 RX ADMIN — MAGNESIUM OXIDE TAB 400 MG (241.3 MG ELEMENTAL MG) 400 MG: 400 (241.3 MG) TAB at 20:10

## 2022-11-07 RX ADMIN — FUROSEMIDE 80 MG: 10 INJECTION, SOLUTION INTRAMUSCULAR; INTRAVENOUS at 20:11

## 2022-11-07 RX ADMIN — METHOCARBAMOL 500 MG: 500 TABLET ORAL at 20:10

## 2022-11-07 RX ADMIN — NYSTATIN 500000 UNITS: 100000 SUSPENSION ORAL at 20:10

## 2022-11-07 RX ADMIN — LEVOTHYROXINE SODIUM 150 MCG: 0.05 TABLET ORAL at 08:59

## 2022-11-07 RX ADMIN — SULFAMETHOXAZOLE AND TRIMETHOPRIM 1 TABLET: 400; 80 TABLET ORAL at 08:58

## 2022-11-07 RX ADMIN — METHOCARBAMOL 500 MG: 500 TABLET ORAL at 08:59

## 2022-11-07 RX ADMIN — TACROLIMUS 6 MG: 5 CAPSULE ORAL at 18:17

## 2022-11-07 RX ADMIN — NYSTATIN 500000 UNITS: 100000 SUSPENSION ORAL at 09:06

## 2022-11-07 ASSESSMENT — ACTIVITIES OF DAILY LIVING (ADL)
ADLS_ACUITY_SCORE: 33
ADLS_ACUITY_SCORE: 37
ADLS_ACUITY_SCORE: 33

## 2022-11-07 NOTE — PLAN OF CARE
"Shift: 4181-6016  /82 (BP Location: Right arm)   Pulse 59   Temp 97.5  F (36.4  C) (Oral)   Resp 16   Ht 1.676 m (5' 6\")   Wt 92.6 kg (204 lb 1.6 oz)   SpO2 94%   BMI 32.94 kg/m       VSS on room air  Patient Complains of Abdominal back and leg pain.   PRN oxy given w/ relief.  BG- ACHS [phone] On sliding scale wesley log.  Provider notified about high BG.  Patient denies nausea.  Urine Output - voiding 750 ml   Bowel Function - two  bowel movement   Nutrition - Regular diet with Juan counts  Drains/ Lines- Left PIV SL  Activity - stand by assist walker  Cont. W/ plan of care.      "

## 2022-11-07 NOTE — PROGRESS NOTES
Calorie Count  Intake recorded for: 11/6  Total Kcals: 330 Total Protein: 9g  Kcals from Hospital Food: 330   Protein: 9g  Kcals from Outside Food (average):0 Protein: 0g  # Meals Ordered from Kitchen: 3 meals  # Meals Recorded: 1 meal (100% grilled cheese sandwich, sherbet, 25% tomato soup)  # Supplements Recorded: 0

## 2022-11-07 NOTE — PLAN OF CARE
"/71 (BP Location: Right arm)   Pulse 70   Temp 98.4  F (36.9  C) (Oral)   Resp 16   Ht 1.676 m (5' 6\")   Wt 92.6 kg (204 lb 1.6 oz)   SpO2 96%   BMI 32.94 kg/m       Patient alert and oriented. VS stable. Patient on room air. Patient complains of pain. PRN oxy given (See MAR). PRN Atarax given (See MAR). Lidocaine patches in use. Patient denies nausea. BG - ACHS. Urine Output - voiding without difficulty. Bowel Function - BM x 2 during shift. Nutrition - Regular diet with calorie counts. PIV - saline locked. Old TERESITA site. Activity - Assist of 1 with walker. Plan of Care - Will continue to monitor and notify care team of any changes.    "

## 2022-11-07 NOTE — PROGRESS NOTES
Transplant Surgery  Inpatient Daily Progress Note  11/07/2022    Assessment & Plan: Susan Puckett is a 52 year old female with PMH significant obesity s/p Faustino-en-Y, DM, HTN, CUETO cirrhosis c/b HE, EV, Hepatic hydrothorax. She is now s/p OLTx 11/2 with Dr. Delbert Morgan.    Graft function:  Liver transplant: POD # 5. Liver transaminases trending down  -JPs: removed later-al drain on 11/4, removed last drain on 11/5.   Graft patency ppx: ASA 81 daily  Liver US: POD #0, Patent doppler, Elevated RI=1 in HA  Repeat US POD #1, unchanged elevated RI in HA  Repeat US POD #2, continued elevated RIs, slightly improved flow in the left hepatic artery   Immunosuppression management:  Steroid taper induction  Maintenance:  Tac 6mg BID (goal 8-12) Level 7 today, no change.   MMF 750mg BID  Complexity of management:High. Contributing factors:  induction , Flucon x2, now completed.   Hematology:   Acute blood loss/Anemia of chronic disease: Hgb improved today, 10  Thrombocytopenia: due to liver disease, Plts >60  Coagulopathy: due to liver disease, INR 1.3 on 11/4. Received 1U cryo overnight on 11/3.  Neurology:  Acute postoperative pain:uncontrolled. Continue Oxycodone 5mg Q4, Robaxin 500mg QID, Atarax PRN. add lidocaine patches.  Right thigh pain:  US 11/6 no DVT.  Hx of hypoglycemic seizures: continue topamax  Depression: Continue lexapro, Trazadone  Cardiorespiratory:   Hypoxia: Resolved, on room air. Encourage CDB/IS  GI/Nutrition: At risk for malnutrition:  Reg diet +supplements as tolerated, minimal intake. Will discuss with dietician.   Hx of Gastric bypass: restart SAMI mvi  Pancreatic insufficiency: on Creon PTA, restart.   Constipation: Reporting abdominal bloating. Continue bowel regimen senna and miralax  Endocrine:   Steroid induced hyperglycemia/DM: on Januvia and insulin PTA, started insulin gtt initially post operatively. Discontinued insulin gtt and transitioned to ssi prn, continue Lantus 10 units daily. Start  Carb coverage. Monitor closely with steroid taper   Hypothyroid: continue synthroid   Fluid/Electrolytes:   Hypervolemia associated with renal insufficiency: Weight Up 13kg. Continue lasix.   Hypoalbuminemia: Albumin 3.1.   GEORGETTE: Improving, SCr peaked at 1.75, (0.9 at baseline). 1 today. Continue Lasix 80 mg IV BID.   Hypomagnesemia: Mag ox 400 mg daily  Hypokalemia: improved after supplement, K 3.9  Hyponatremia: Na 133  : Rossi removed, continue strict I&Os   Infectious disease: Afebrile, WBC normal  Covid + 11/1: Cycle threshold =39.7, covid recovered     Prophylaxis: DVT(SCDs, GI (PPI), fungal (diflucan x1, nystatin), PCP ppx (bactrim), CMV ppx (valcyte)   Periop ppx zosyn x48hrs,   Disposition: 7A, PT/OT    Medical Decision Making: High  Subsequent visit 71512 (high level decision making)     ANYA/Fellow/Resident Provider: Ange Sánchez NP        Faculty: Mil Tejada M.D.      __________________________________________________________________  Transplant History: Admitted 11/1/2022 for Liver transplant .    11/2/2022 (Liver), Postoperative day: 5     Interval History: History is obtained from the patient  Overnight events: Pain not well controlled but patient awake and alert and reporting ambulated x2 in halls today.c/o abdominal pain but believes mostly related to bloating/constipation. Notes this is limiting her po intake.  Drinking po juices/sodas today. Not much food. C/o right thigh pain.      ROS:   A 10-point review of systems was negative except as noted above.    Curent Meds:   amylase-lipase-protease  2 capsule Oral TID w/meals    aspirin  81 mg Oral Daily    escitalopram  10 mg Oral or Feeding Tube Daily    furosemide  80 mg Intravenous BID    insulin aspart   Subcutaneous TID w/meals    insulin aspart  1-10 Units Subcutaneous TID AC    insulin aspart  1-7 Units Subcutaneous At Bedtime    insulin glargine  10 Units Subcutaneous QAM AC    levothyroxine  150 mcg Oral or Feeding Tube QAM AC  "   lidocaine  2 patch Transdermal Q24H    lidocaine  2-3 patch Transdermal Q24H    lidocaine   Transdermal Q8H JULIEN    lidocaine   Transdermal Q8H JULIEN    magnesium oxide  400 mg Oral BID    methocarbamol  500 mg Oral 4x Daily    multivitamin CF FORMULA  1 capsule Oral Daily    mycophenolate  750 mg Oral BID IS    nystatin  500,000 Units Oral 4x Daily    pantoprazole  40 mg Oral Daily    polyethylene glycol  17 g Oral Daily    sennosides  2 tablet Oral or Feeding Tube BID    sodium chloride (PF)  3 mL Intravenous Q8H    sulfamethoxazole-trimethoprim  1 tablet Oral or Feeding Tube Daily    tacrolimus  6 mg Oral BID IS    topiramate  50 mg Oral or Feeding Tube Daily    traZODone  50 mg Oral or Feeding Tube At Bedtime    valGANciclovir  450 mg Oral Daily       Physical Exam:     Admit Weight: 79.9 kg (176 lb 1.6 oz)    Current Vitals:   /71 (BP Location: Right arm)   Pulse 70   Temp 98.4  F (36.9  C) (Oral)   Resp 16   Ht 1.676 m (5' 6\")   Wt 92.6 kg (204 lb 1.6 oz)   SpO2 96%   BMI 32.94 kg/m      Vital sign ranges:    Temp:  [97.5  F (36.4  C)-98.5  F (36.9  C)] 98.4  F (36.9  C)  Pulse:  [54-71] 70  Resp:  [16-18] 16  BP: (103-127)/(71-82) 103/71  SpO2:  [94 %-100 %] 96 %  Patient Vitals for the past 24 hrs:   BP Temp Temp src Pulse Resp SpO2   11/07/22 1335 103/71 98.4  F (36.9  C) Oral 70 16 96 %   11/07/22 1059 122/78 98.1  F (36.7  C) Oral 71 18 100 %   11/07/22 0603 (!) (P) 135/92 (P) 97.8  F (36.6  C) (P) Oral (P) 61 (P) 18 (P) 97 %   11/07/22 0246 122/72 98.5  F (36.9  C) Oral 54 16 99 %   11/06/22 2212 127/82 97.5  F (36.4  C) Oral 59 16 94 %     General Appearance: in no apparent distress.  Skin: normal, warm, dry, No rashes, induration, or jaundice  Heart: regular rate and rhythm,   Lungs: NLB on room air  Abdomen: slightly distended, rounded, appropriately tender to palpation, incision with staples CDI.   : Voiding  Extremities: edema: present bilateral. 2+.   Neurologic: A&Ox3. CNS intact to " RLE.  Neha independently. Tremor absent. Asterixis: absent.    Frailty Scores       Frailty Scores 1/6/2022 10/18/2020    Final Score Frail Not Frail    Final Score Number 3 2            Data:   CMP  Recent Labs   Lab 11/07/22  1333 11/07/22  1243 11/07/22  0830 11/06/22  0756 11/06/22  0618 11/03/22  0452 11/03/22  0358 11/02/22  1625 11/02/22  1434 11/02/22  0955 11/01/22 2038   NA  --   --  133*  --  136   < > 143   < > 142   < > 149*   POTASSIUM  --   --  3.9  --  3.2*   < > 3.9   < > 2.9*   < > 4.1   CHLORIDE  --   --  98  --  100   < > 107   < >  --   --  117*   CO2  --   --  20*  --  22   < > 24   < >  --   --  17*   * 330* 284*   < > 211*   < > 149*   < > 263*   < > 130*   BUN  --   --  23.4*  --  30.2*   < > 17.1   < >  --   --  9.9   CR  --   --  1.06*  --  1.25*   < > 0.87   < >  --   --  0.93   GFRESTIMATED  --   --  64  --  52*   < > 81   < >  --   --  75   MAURI  --   --  7.5*  --  7.5*   < > 8.8   < >  --   --  8.6   ICAW  --   --   --   --   --   --  4.7  --  5.3*   < >  --    MAG  --   --  1.6*  --  1.6*   < > 2.0  --   --   --  1.7   PHOS  --   --  2.0*  --  3.0   < > 4.6*  --   --   --  3.3   AMYLASE  --   --   --   --   --   --  65  --   --   --  45   LIPASE  --   --   --   --   --   --  6*  --   --   --   --    ALBUMIN  --   --  3.3*  --  3.1*   < > 3.2*   < >  --   --  2.7*   BILITOTAL  --   --  0.9  --  0.7   < > 1.2   < >  --   --  1.5*   ALKPHOS  --   --  138*  --  94   < > 48   < >  --   --  86   AST  --   --  69*  --  70*   < > 479*   < >  --   --  46*   ALT  --   --  152*  --  159*   < > 371*   < >  --   --  7*    < > = values in this interval not displayed.     CBC  Recent Labs   Lab 11/07/22  0830 11/06/22  0618 11/02/22  1625 11/02/22  1450   HGB 10.1* 9.1*   < > 9.8*   WBC 6.0 4.5   < >  --    PLT 69* 69*   < > 96*   A1C  --   --   --  5.7*    < > = values in this interval not displayed.     COAGS  Recent Labs   Lab 11/04/22  0607 11/03/22  0850 11/02/22  1815 11/02/22  1625  11/02/22  1450   INR 1.32* 1.33*   < > 1.94* 2.79*   PTT  --   --   --  40* 65*    < > = values in this interval not displayed.      Urinalysis  Recent Labs   Lab Test 11/05/22  0928 11/01/22  2133   COLOR Light Yellow Yellow   APPEARANCE Clear Clear   URINEGLC Negative Negative   URINEBILI Negative Negative   URINEKETONE 10* Negative   SG 1.026 1.015   UBLD Negative Negative   URINEPH 5.5 7.0   PROTEIN 20* Negative   NITRITE Negative Negative   LEUKEST Negative Negative   RBCU 1 1   WBCU 3 1     Virology:  Hepatitis C Antibody   Date Value Ref Range Status   11/01/2022 Nonreactive Nonreactive Final     Attestation:    The patient has been seen with team and evaluated by me.   Vital signs, labs, medications and orders were reviewed.   When obtained, diagnostic images were reviewed by me and interpreted as above.    The care plan was discussed with the multidisciplinary team and I agree with the findings and plan in this note, with any differences recorded in blue.    Immunosuppressive medication management was reviewed and adjusted as reflected in the note and orders.     .

## 2022-11-07 NOTE — OP NOTE
Transplant Center  Operative Note     Procedure date:  11/02/22    Preoperative diagnosis:  End Stage Liver Disease due to CUETO    Postoperative diagnosis:  Same    Procedure:  1. Donation after Brain Death Piggyback liver transplant   2. End-to-end Choledochocholedochostomy     Surgeon  Surgeon(s) and Role:     * Delbert Morgan MD - Primary     * Sunday Ricci MD - Fellow - Assisting     * Paulie Miranda MD - Fellow - Assisting    Co-Surgeon:      Fellow/Assistant:  Dr Paulie Miranda served as first assistant as there was no qualfied resident available.  Dr Miranda assisted with the hepatectomy and performed the vascular and biliary anastomoses.  Dr Santi Ricci served as first assistant for the backtable preparation of the liver allograft.     Anesthesia:  General    Specimen:  none    Drains:  JPx2    Urine output:  1350 mls    Estimated blood loss:  1400    Fluids administered:       Intraoperative Events: none    Complications: None.    Findings: none      None.     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 A suitable donor offer has become available. After discussing the risks and benefits of proceeding, the patient agreed to proceed with surgery and provided informed consent.    Final ABO/Crossmatch verification: After the donor organ arrived to the operating room and prior to anastomosis, I participated in the transplant pre-verification upon organ receipt timeout by visually verifying the donor ID, organ and laterality, donor blood type, recipient unique identifier, recipient blood type, and that the donor and recipient are blood type compatible.    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    Postoperative diagnosis:  Same    Surgeon:  Surgeon(s) and Role:     * Delbert Morgan MD - Primary     * Sunday Ricci MD - Fellow - Assisting     * Paulie Miranda MD - Fellow - Assisting    Faculty Co-Surgeon:      Fellow/Assistant:  Dr Santi Ricci served as first assistant as there was no qualified resident available, Dr Ricci performed the back table preparation of the liver allograft under my direction    Anesthesia:  None    Graft biopsy:  No    Macroscopic steatosis:      Back table reconstruction:  No Reconstruction    Intimal flap repair:      # of hepatic arteries:  1    # of portal veins:  1    Accessory arteries:  none    # of hepatic veins:  3    # of bile ducts:  1    Graft weight:       Findings:  Liver laceration: No  Overall quality of liver: Good    Back Table Procedure: The liver allograft was received and inspected and the aforementioned findings were noted. It was flushed with UW. The donor liver was placed in fresh ice-cold preservation solution. The inferior vena cava was identified. Two stay sutures were placed on the supra-hepatic portion of the cava. Two stay sutures were placed on the infra-hepatic portion of the cava. The fibro-fatty tissue and adrenal gland was cleared of inferior vena cava. The phrenic vein was ligated. The adrenal vein was ligated. The IVC was tested for leaks by using a bulb syringe. All the leaks identified were suture ligated. The portal vein was identified. All the fibro-fatty area or tissue around the portal vein was removed and the portal vein was dissected up to  its bifurcation. An 8-Lux[place]bourgish cannula was placed in the portal vein and fixed with a stitch. The portal vein was tested for leaks. All the leaks identified were suture ligated. The cannula was left in place to be used for flushing the liver at the time of implantation. The hepatic artery anatomy was identified. The celiac axis  was traced all the way from the aortic patch to the level of the gastro-duodenal artery. Dissection was stopped at the level of the gastro-duodenal artery. All the leaks in the hepatic artery tributaries were suture ligated. The bile duct was inspected and flushed. No reconstruction was required. The liver was placed back in ice-cold preservation solution until ready for transplantation.    Findings:     Operative Procedure:   Arterial anastomosis start:  11/2/2022 11:59 AM    Recipient arterial unclamp:  11/2/2022  1:20 PM    Extra vessels used:  none    Extra vessels banked:  Yes    Previous upper abd surgery? Faustino en y gastric bypass    Previous cholecystectomy?  No    Portal vein:  Thrombus? No      Patent? Yes-      On portal bypass?  No    Arterial flow:  Sufficient? Yes-         Bile duct anastomosis:  To bowel? No   Specify:    To duct? Yes-     Specify:      Susan Puckett was brought to the operating room, placed in a supine position, and a time out was performed. Sequential compression devices were placed on both lower extremities and general endotracheal anesthesia was induced. The patient was given IV antibiotics, and  Cellcept and Solumedrol. A Rossi catheter was placed. A central line was placed by Anesthesia service. The abdomen was then shaved, prepped, and draped in the usual sterile fashion. The backtable preparation occurred prior to implantation.    The abdomen was opened through a subcostal incision. The falciform was taken down. We placed the retractors. The abdomen was examined.The left lateral segment was mobilized off the diaphragm and the lesser omentum opened. The  right lobe was mobilized from the inferior peritoneal reflections.     Retractors were set up. Exposure of the mary hepatis was made. The cystic duct was identified, ligated, and divided. The right lateral side of the mary hepatis was opened. The cystic artery was identified, ligated, and divided. Proper hepatic artery and right and small left hepatic artery were identified. The remaining portion of the mary hepatis was opened. No significant arteries were found to the right of the bile duct. The bile duct was then divided near the bifurcation. Patent portal vein was identified and dissected from below the GDA to the bifurcation. Due to ESLD, coagulopathy and bleeding was encountered.    At this juncture, we mobilized the remaining portion of the right lobe to the midline. The small accessory hepatic veins were isolated, ligated and divided as they entered into the inferior vena cava.     It was elected to devascularize the liver to more easily remove it. The right hepatic artery was ligated. The portal vein was clamped just below the duodenum and divided at the bifurcation. The remaining portion of the liver was taken off the anterior surface of the inferior vena cava. Eventually, the liver was only suspended on the right middle and left hepatic veins. The right hepatic vein was ligated and divided. A Colombian clamp was placed on the confluence of the middle and left hepatic vein and the liver was resected off. The specimen was handed off. Hemostasis was obtained.     The clamp was repositioned to include the right, middle, and left hepatic veins. An orifice was made on the cava incorporating these 3. At this juncture, the liver had been brought back, it had been backtabled and the liver was found to be suitable.     The suprahepatic cava of the donor was anastomosed in end-to-side fashion on to the cava out with a running 3-0 Prolene, the liver was then flushed of its preservation solution with cold albumin 1000  cc. We ligated the donor infrahepatic cava. The portal vein of the donor and recipient was anastomosed in an end-to-end fashion with a running 6-0 Prolene with a growth knot the diameter of the recipient vein. Clamps were released. The patient tolerated the unclamping reasonably well.     During the anastomosis, any coagulopathic bleeding was corrected by anesthesia. Common arterial patch was anastomosed to recipient's proper hepatic artery. When hemostasis was secured, the bile ducts were trimmed. The donor gallbladder was removed. The 2 ducts were anastomosed in an end-to-end fashion using running 6-0 PDS. After the posterior wall was run and then the anterior wall was closed.     We irrigated the abdomen. Hemostasis again verified as being adequate. The liver had been making a good amount of bile. Two Darell drains were placed in RUQ. The abdomen was irrigated again and then the fascia closed with a running looped PDS. The skin approximated with staples. All needle, sponge, and instrument counts were accurate. The patient  tolerated the procedure well without apparent complications and was transferred to the ICU in good condition.    Physician Attestation   I was present for the entire procedure between opening and closing.    Delbert Morgan MD  Date of Service (when I saw the patient): 11/2/2022

## 2022-11-07 NOTE — PLAN OF CARE
"VS: /72 (BP Location: Right arm)   Pulse 54   Temp 98.5  F (36.9  C) (Oral)   Resp 16   Ht 1.676 m (5' 6\")   Wt 92.6 kg (204 lb 1.6 oz)   SpO2 99%   BMI 32.94 kg/m        Neuro: alert and oriented X4   Cardio: WNL   Respiratory: RA   GI/: continent of bowel and bladder  Void a large amount   Skin: has a clam shell incision intact with staples is jaundice and bruised has +2 generalized edema   Diet:   Regular   Labs: pending AM   BG: AC HS 0100- 260, recheck at 0220- 203 team was updated   LDA:  PIV SL   Mobility:  Up with assist 1 and walker   Pain: abdominal pain   PRN medications: atarax and oxycodone X2,  Zofran and Rizatriptan   Changes: patient stated that her pain was worse in lower abdomen and that she was nausea and MD came and saw the patient.   Plan of Care:  Had X2 walks around the unit and was encouraged to continue to walk                         "

## 2022-11-07 NOTE — PROGRESS NOTES
Transplant Social Work Services Progress Note    Date of Initial Social Work Evaluation: 10/19/2022  Collaborated with: Patient at bedside, [facility]    Data: Susan underwent a  donor liver transplant on 2022.   Intervention: I met with Susan at bedside and provided supportive check in. I discussed potential of TCU. She reported that she has been walking today and would like to see how it goes. Per notes today: PT recommending home. She is OK with [facility] following her progress in the event that she would need TCU. She reported that she was someone confused post surgery and the aunt of the donor visited her, not the donor's mother. We spent some time talking about the specific donor situation.   Susan inquired about resources for parking. I provided a week parking pass for pt and her .     Assessment: PT recommending home with A   Education provided by SW: TCU  Plan:    Discharge Plans in Progress: Home     Barriers to d/c plan: Medical stability     Follow up Plan: This writer will follow for psychosocial needs.     RENARD Alvarado, [facility]  Liver Transplant   [facility]  Phone: [phone]  Pager: [phone]

## 2022-11-08 ENCOUNTER — DOCUMENTATION ONLY (OUTPATIENT)
Dept: TRANSPLANT | Facility: CLINIC | Age: 52
End: 2022-11-08

## 2022-11-08 ENCOUNTER — APPOINTMENT (OUTPATIENT)
Dept: PHYSICAL THERAPY | Facility: CLINIC | Age: 52
DRG: 005 | End: 2022-11-08
Attending: TRANSPLANT SURGERY
Payer: COMMERCIAL

## 2022-11-08 ENCOUNTER — TELEPHONE (OUTPATIENT)
Dept: TRANSPLANT | Facility: CLINIC | Age: 52
End: 2022-11-08

## 2022-11-08 DIAGNOSIS — Z94.4 LIVER REPLACED BY TRANSPLANT (H): Primary | ICD-10-CM

## 2022-11-08 PROBLEM — D69.6 THROMBOCYTOPENIA (H): Status: ACTIVE | Noted: 2020-07-29

## 2022-11-08 PROBLEM — E87.1 HYPONATREMIA: Status: ACTIVE | Noted: 2022-11-08

## 2022-11-08 PROBLEM — R09.02 HYPOXIA: Status: ACTIVE | Noted: 2022-11-08

## 2022-11-08 PROBLEM — T38.0X5A STEROID-INDUCED HYPERGLYCEMIA: Status: ACTIVE | Noted: 2021-11-06

## 2022-11-08 PROBLEM — N17.9 AKI (ACUTE KIDNEY INJURY) (H): Status: ACTIVE | Noted: 2022-11-08

## 2022-11-08 PROBLEM — D63.8 ANEMIA IN OTHER CHRONIC DISEASES CLASSIFIED ELSEWHERE: Status: ACTIVE | Noted: 2022-11-08

## 2022-11-08 PROBLEM — E88.09 HYPOALBUMINEMIA: Status: ACTIVE | Noted: 2022-11-08

## 2022-11-08 PROBLEM — D68.9 COAGULOPATHY (H): Status: ACTIVE | Noted: 2022-11-08

## 2022-11-08 LAB
ALBUMIN SERPL BCG-MCNC: 3 G/DL (ref 3.5–5.2)
ALP SERPL-CCNC: 176 U/L (ref 35–104)
ALT SERPL W P-5'-P-CCNC: 135 U/L (ref 10–35)
ANION GAP SERPL CALCULATED.3IONS-SCNC: 11 MMOL/L (ref 7–15)
AST SERPL W P-5'-P-CCNC: 63 U/L (ref 10–35)
BASOPHILS # BLD AUTO: 0 10E3/UL (ref 0–0.2)
BASOPHILS NFR BLD AUTO: 0 %
BILIRUB DIRECT SERPL-MCNC: 0.52 MG/DL (ref 0–0.3)
BILIRUB SERPL-MCNC: 0.9 MG/DL
BUN SERPL-MCNC: 16.8 MG/DL (ref 6–20)
CALCIUM SERPL-MCNC: 6.7 MG/DL (ref 8.6–10)
CHLORIDE SERPL-SCNC: 97 MMOL/L (ref 98–107)
CREAT SERPL-MCNC: 1.09 MG/DL (ref 0.51–0.95)
DEPRECATED HCO3 PLAS-SCNC: 24 MMOL/L (ref 22–29)
EOSINOPHIL # BLD AUTO: 0.3 10E3/UL (ref 0–0.7)
EOSINOPHIL NFR BLD AUTO: 8 %
ERYTHROCYTE [DISTWIDTH] IN BLOOD BY AUTOMATED COUNT: 15.4 % (ref 10–15)
GFR SERPL CREATININE-BSD FRML MDRD: 62 ML/MIN/1.73M2
GLUCOSE BLDC GLUCOMTR-MCNC: 167 MG/DL (ref 70–99)
GLUCOSE BLDC GLUCOMTR-MCNC: 189 MG/DL (ref 70–99)
GLUCOSE BLDC GLUCOMTR-MCNC: 192 MG/DL (ref 70–99)
GLUCOSE BLDC GLUCOMTR-MCNC: 211 MG/DL (ref 70–99)
GLUCOSE BLDC GLUCOMTR-MCNC: 226 MG/DL (ref 70–99)
GLUCOSE BLDC GLUCOMTR-MCNC: 242 MG/DL (ref 70–99)
GLUCOSE SERPL-MCNC: 220 MG/DL (ref 70–99)
HCT VFR BLD AUTO: 28.7 % (ref 35–47)
HGB BLD-MCNC: 9.5 G/DL (ref 11.7–15.7)
IMM GRANULOCYTES # BLD: 0 10E3/UL
IMM GRANULOCYTES NFR BLD: 1 %
LYMPHOCYTES # BLD AUTO: 0.5 10E3/UL (ref 0.8–5.3)
LYMPHOCYTES NFR BLD AUTO: 13 %
MAGNESIUM SERPL-MCNC: 1.6 MG/DL (ref 1.7–2.3)
MCH RBC QN AUTO: 31.4 PG (ref 26.5–33)
MCHC RBC AUTO-ENTMCNC: 33.1 G/DL (ref 31.5–36.5)
MCV RBC AUTO: 95 FL (ref 78–100)
MONOCYTES # BLD AUTO: 0.3 10E3/UL (ref 0–1.3)
MONOCYTES NFR BLD AUTO: 6 %
NEUTROPHILS # BLD AUTO: 3 10E3/UL (ref 1.6–8.3)
NEUTROPHILS NFR BLD AUTO: 72 %
NRBC # BLD AUTO: 0 10E3/UL
NRBC BLD AUTO-RTO: 0 /100
PHOSPHATE SERPL-MCNC: 1.6 MG/DL (ref 2.5–4.5)
PLATELET # BLD AUTO: 70 10E3/UL (ref 150–450)
POTASSIUM SERPL-SCNC: 3.3 MMOL/L (ref 3.4–5.3)
PROT SERPL-MCNC: 4.9 G/DL (ref 6.4–8.3)
RBC # BLD AUTO: 3.03 10E6/UL (ref 3.8–5.2)
SODIUM SERPL-SCNC: 132 MMOL/L (ref 136–145)
TACROLIMUS BLD-MCNC: 8.3 UG/L (ref 5–15)
TME LAST DOSE: NORMAL H
TME LAST DOSE: NORMAL H
WBC # BLD AUTO: 4.1 10E3/UL (ref 4–11)

## 2022-11-08 PROCEDURE — 97116 GAIT TRAINING THERAPY: CPT | Mod: GP | Performed by: REHABILITATION PRACTITIONER

## 2022-11-08 PROCEDURE — 82248 BILIRUBIN DIRECT: CPT | Performed by: NURSE PRACTITIONER

## 2022-11-08 PROCEDURE — 80197 ASSAY OF TACROLIMUS: CPT | Performed by: PHYSICIAN ASSISTANT

## 2022-11-08 PROCEDURE — 250N000013 HC RX MED GY IP 250 OP 250 PS 637: Performed by: NURSE PRACTITIONER

## 2022-11-08 PROCEDURE — 250N000012 HC RX MED GY IP 250 OP 636 PS 637: Performed by: PHYSICIAN ASSISTANT

## 2022-11-08 PROCEDURE — 120N000011 HC R&B TRANSPLANT UMMC

## 2022-11-08 PROCEDURE — 97530 THERAPEUTIC ACTIVITIES: CPT | Mod: GP | Performed by: REHABILITATION PRACTITIONER

## 2022-11-08 PROCEDURE — 250N000013 HC RX MED GY IP 250 OP 250 PS 637: Performed by: SURGERY

## 2022-11-08 PROCEDURE — 250N000013 HC RX MED GY IP 250 OP 250 PS 637

## 2022-11-08 PROCEDURE — 250N000013 HC RX MED GY IP 250 OP 250 PS 637: Performed by: TRANSPLANT SURGERY

## 2022-11-08 PROCEDURE — 97535 SELF CARE MNGMENT TRAINING: CPT | Mod: GO

## 2022-11-08 PROCEDURE — 250N000012 HC RX MED GY IP 250 OP 636 PS 637: Performed by: NURSE PRACTITIONER

## 2022-11-08 PROCEDURE — 84100 ASSAY OF PHOSPHORUS: CPT | Performed by: NURSE PRACTITIONER

## 2022-11-08 PROCEDURE — 36415 COLL VENOUS BLD VENIPUNCTURE: CPT | Performed by: NURSE PRACTITIONER

## 2022-11-08 PROCEDURE — 85004 AUTOMATED DIFF WBC COUNT: CPT | Performed by: NURSE PRACTITIONER

## 2022-11-08 PROCEDURE — 97530 THERAPEUTIC ACTIVITIES: CPT | Mod: GO

## 2022-11-08 PROCEDURE — 83735 ASSAY OF MAGNESIUM: CPT | Performed by: TRANSPLANT SURGERY

## 2022-11-08 PROCEDURE — 250N000011 HC RX IP 250 OP 636: Performed by: NURSE PRACTITIONER

## 2022-11-08 PROCEDURE — 250N000013 HC RX MED GY IP 250 OP 250 PS 637: Performed by: PHYSICIAN ASSISTANT

## 2022-11-08 RX ORDER — POLYETHYLENE GLYCOL 3350 17 G/17G
17 POWDER, FOR SOLUTION ORAL 2 TIMES DAILY
Status: DISCONTINUED | OUTPATIENT
Start: 2022-11-08 | End: 2022-11-10 | Stop reason: HOSPADM

## 2022-11-08 RX ORDER — LIDOCAINE HYDROCHLORIDE 10 MG/ML
5 INJECTION, SOLUTION EPIDURAL; INFILTRATION; INTRACAUDAL; PERINEURAL ONCE
Status: DISCONTINUED | OUTPATIENT
Start: 2022-11-08 | End: 2022-11-09 | Stop reason: CLARIF

## 2022-11-08 RX ORDER — IODINE/SODIUM IODIDE 2 %
TINCTURE TOPICAL
Status: DISCONTINUED | OUTPATIENT
Start: 2022-11-08 | End: 2022-11-10 | Stop reason: HOSPADM

## 2022-11-08 RX ORDER — LIDOCAINE 4 G/G
2-3 PATCH TOPICAL
Status: DISCONTINUED | OUTPATIENT
Start: 2022-11-08 | End: 2022-11-10 | Stop reason: HOSPADM

## 2022-11-08 RX ORDER — GABAPENTIN 100 MG/1
200 CAPSULE ORAL 2 TIMES DAILY
Status: DISCONTINUED | OUTPATIENT
Start: 2022-11-08 | End: 2022-11-10 | Stop reason: HOSPADM

## 2022-11-08 RX ORDER — POTASSIUM CHLORIDE 750 MG/1
20 TABLET, EXTENDED RELEASE ORAL ONCE
Status: COMPLETED | OUTPATIENT
Start: 2022-11-08 | End: 2022-11-08

## 2022-11-08 RX ORDER — MAGNESIUM SULFATE HEPTAHYDRATE 40 MG/ML
2 INJECTION, SOLUTION INTRAVENOUS ONCE
Status: COMPLETED | OUTPATIENT
Start: 2022-11-08 | End: 2022-11-08

## 2022-11-08 RX ORDER — BISACODYL 10 MG
10 SUPPOSITORY, RECTAL RECTAL DAILY PRN
Status: DISCONTINUED | OUTPATIENT
Start: 2022-11-08 | End: 2022-11-10 | Stop reason: HOSPADM

## 2022-11-08 RX ORDER — CALCIUM CARBONATE 500(1250)
500 TABLET ORAL 2 TIMES DAILY
Status: DISCONTINUED | OUTPATIENT
Start: 2022-11-08 | End: 2022-11-10 | Stop reason: HOSPADM

## 2022-11-08 RX ADMIN — METHOCARBAMOL 500 MG: 500 TABLET ORAL at 16:57

## 2022-11-08 RX ADMIN — POTASSIUM PHOSPHATE, MONOBASIC 500 MG: 500 TABLET, SOLUBLE ORAL at 12:56

## 2022-11-08 RX ADMIN — ESCITALOPRAM OXALATE 10 MG: 10 TABLET ORAL at 08:41

## 2022-11-08 RX ADMIN — OXYCODONE HYDROCHLORIDE 5 MG: 5 TABLET ORAL at 02:58

## 2022-11-08 RX ADMIN — LEVOTHYROXINE SODIUM 150 MCG: 0.05 TABLET ORAL at 08:41

## 2022-11-08 RX ADMIN — VALGANCICLOVIR 450 MG: 450 TABLET, FILM COATED ORAL at 08:41

## 2022-11-08 RX ADMIN — MAGNESIUM SULFATE IN WATER 2 G: 40 INJECTION, SOLUTION INTRAVENOUS at 11:52

## 2022-11-08 RX ADMIN — FERRIC OXIDE RED: 8; 8 LOTION TOPICAL at 21:27

## 2022-11-08 RX ADMIN — OXYCODONE HYDROCHLORIDE 5 MG: 5 TABLET ORAL at 11:50

## 2022-11-08 RX ADMIN — MYCOPHENOLATE MOFETIL 750 MG: 250 CAPSULE ORAL at 08:41

## 2022-11-08 RX ADMIN — SIMETHICONE 80 MG: 80 TABLET, CHEWABLE ORAL at 03:50

## 2022-11-08 RX ADMIN — HYDROXYZINE HYDROCHLORIDE 50 MG: 25 TABLET, FILM COATED ORAL at 22:23

## 2022-11-08 RX ADMIN — Medication 500 MG: at 20:05

## 2022-11-08 RX ADMIN — MYCOPHENOLATE MOFETIL 750 MG: 250 CAPSULE ORAL at 17:33

## 2022-11-08 RX ADMIN — POTASSIUM PHOSPHATE, MONOBASIC 500 MG: 500 TABLET, SOLUBLE ORAL at 20:08

## 2022-11-08 RX ADMIN — NYSTATIN 500000 UNITS: 100000 SUSPENSION ORAL at 08:41

## 2022-11-08 RX ADMIN — PANCRELIPASE 2 CAPSULE: 24000; 76000; 120000 CAPSULE, DELAYED RELEASE PELLETS ORAL at 17:32

## 2022-11-08 RX ADMIN — GABAPENTIN 200 MG: 100 CAPSULE ORAL at 20:05

## 2022-11-08 RX ADMIN — HYDROXYZINE HYDROCHLORIDE 50 MG: 25 TABLET, FILM COATED ORAL at 03:50

## 2022-11-08 RX ADMIN — GABAPENTIN 200 MG: 100 CAPSULE ORAL at 09:08

## 2022-11-08 RX ADMIN — SENNOSIDES 2 TABLET: 8.6 TABLET, FILM COATED ORAL at 09:09

## 2022-11-08 RX ADMIN — POLYETHYLENE GLYCOL 3350 17 G: 17 POWDER, FOR SOLUTION ORAL at 20:05

## 2022-11-08 RX ADMIN — NYSTATIN 500000 UNITS: 100000 SUSPENSION ORAL at 11:53

## 2022-11-08 RX ADMIN — LIDOCAINE PATCH 4% 2 PATCH: 40 PATCH TOPICAL at 08:44

## 2022-11-08 RX ADMIN — SULFAMETHOXAZOLE AND TRIMETHOPRIM 1 TABLET: 400; 80 TABLET ORAL at 08:44

## 2022-11-08 RX ADMIN — MAGNESIUM OXIDE TAB 400 MG (241.3 MG ELEMENTAL MG) 400 MG: 400 (241.3 MG) TAB at 11:50

## 2022-11-08 RX ADMIN — METHOCARBAMOL 500 MG: 500 TABLET ORAL at 11:51

## 2022-11-08 RX ADMIN — METHOCARBAMOL 500 MG: 500 TABLET ORAL at 20:05

## 2022-11-08 RX ADMIN — Medication 1 CAPSULE: at 12:56

## 2022-11-08 RX ADMIN — OXYCODONE HYDROCHLORIDE 5 MG: 5 TABLET ORAL at 16:58

## 2022-11-08 RX ADMIN — TACROLIMUS 6 MG: 5 CAPSULE ORAL at 08:43

## 2022-11-08 RX ADMIN — SENNOSIDES 2 TABLET: 8.6 TABLET, FILM COATED ORAL at 20:05

## 2022-11-08 RX ADMIN — TACROLIMUS 6 MG: 5 CAPSULE ORAL at 17:33

## 2022-11-08 RX ADMIN — RIZATRIPTAN BENZOATE 10 MG: 10 TABLET ORAL at 09:08

## 2022-11-08 RX ADMIN — TOPIRAMATE 50 MG: 50 TABLET ORAL at 08:44

## 2022-11-08 RX ADMIN — OXYCODONE HYDROCHLORIDE 5 MG: 5 TABLET ORAL at 21:22

## 2022-11-08 RX ADMIN — NYSTATIN 500000 UNITS: 100000 SUSPENSION ORAL at 20:05

## 2022-11-08 RX ADMIN — ASPIRIN 81 MG CHEWABLE TABLET 81 MG: 81 TABLET CHEWABLE at 08:44

## 2022-11-08 RX ADMIN — NYSTATIN 500000 UNITS: 100000 SUSPENSION ORAL at 16:58

## 2022-11-08 RX ADMIN — POLYETHYLENE GLYCOL 3350 17 G: 17 POWDER, FOR SOLUTION ORAL at 09:10

## 2022-11-08 RX ADMIN — PANTOPRAZOLE SODIUM 40 MG: 40 TABLET, DELAYED RELEASE ORAL at 08:44

## 2022-11-08 RX ADMIN — METHOCARBAMOL 500 MG: 500 TABLET ORAL at 08:44

## 2022-11-08 RX ADMIN — POTASSIUM CHLORIDE 20 MEQ: 750 TABLET, EXTENDED RELEASE ORAL at 10:39

## 2022-11-08 RX ADMIN — HYDROXYZINE HYDROCHLORIDE 25 MG: 25 TABLET, FILM COATED ORAL at 16:58

## 2022-11-08 RX ADMIN — TRAZODONE HYDROCHLORIDE 50 MG: 50 TABLET ORAL at 22:24

## 2022-11-08 RX ADMIN — BISACODYL 10 MG: 10 SUPPOSITORY RECTAL at 20:01

## 2022-11-08 RX ADMIN — MAGNESIUM OXIDE TAB 400 MG (241.3 MG ELEMENTAL MG) 400 MG: 400 (241.3 MG) TAB at 20:05

## 2022-11-08 ASSESSMENT — ACTIVITIES OF DAILY LIVING (ADL)
ADLS_ACUITY_SCORE: 33
ADLS_ACUITY_SCORE: 32
ADLS_ACUITY_SCORE: 33
ADLS_ACUITY_SCORE: 33
ADLS_ACUITY_SCORE: 34
ADLS_ACUITY_SCORE: 33
ADLS_ACUITY_SCORE: 33
ADLS_ACUITY_SCORE: 34
ADLS_ACUITY_SCORE: 33
ADLS_ACUITY_SCORE: 32
ADLS_ACUITY_SCORE: 33
ADLS_ACUITY_SCORE: 34

## 2022-11-08 NOTE — PROGRESS NOTES
Reviewed pt in transplant conference, expected to discharge in the next 24-48 hours. Discharge appts placed with transplant surgeon and transplant hepatologist.

## 2022-11-08 NOTE — PROGRESS NOTES
Calorie Count  Intake recorded for: 11/7  Total Kcals: 0 Total Protein: 0g  Kcals from Hospital Food: 0   Protein: 0g  Kcals from Outside Food (average):0 Protein: 0g  # Meals Ordered from Kitchen: 3 meals   # Meals Recorded: no intake recorded.   # Supplements Recorded: no intake recorded.

## 2022-11-08 NOTE — TELEPHONE ENCOUNTER
A pharmacist spent 30 minutes providing medication teaching with Susan Puckett and spouse Rivera for discharge with a focus on new medications/dose changes.  The discharge medication list was reviewed with the patient/family and the following points were discussed, as applicable: Name, description, purpose, dose/strength, duration of medications, common side effects, food/medications to avoid, action to be taken if dose is missed and how to obtain refills.  The spouse (Rivera) will be responsible for managing medications. Additionally, the following transplant related education was covered: Purpose of medication card, Timing of medications and day of lab draw considerations , Emesis or missed dose, Prescription Insurance  and Discharge process for receiving meds   Patient will  transplant supplies including 7 day pill organizer, thermometer, and BP monitor at the discharge pharmacy along with medications.  Patient chooses to receive medications from  specialty pharmacy.   Clinical Pharmacy Consult:                                                      Transplant Specific:   Date of Transplant: 11/02/2022  Type of Transplant: liver  First Transplant: yes  History of rejection: no    Immunosuppression Regimen   TAC 6 mg qAM & 6 mg qPM and  mg qAM & 750 mg qPM  Patient specific goal: Tac 8 to 12  Most recent level: 7 (14 hour trough), date 11/07  Immunosuppressant Levels:  Subtherapeutic  Pt adherent to lab draws: yes  Scr:   Lab Results   Component Value Date    CR 1.09 11/08/2022    CR 1.10 07/05/2021     Side effects: no side effects    Prophylactic Medications  Antibacterial:  Bactrim 400/80 mg once daily  Scheduled Discontinue Date: 6 months    Antifungal: Nystatin  Scheduled Discontinue Date: upon discharge    Antiviral: Max dose in Liver transplant is Valcyte 450mg once daily   Scheduled Discontinue Date: 3 months    Acid Reducer: Protonix (pantoprazole) 40 mg once daily  Scheduled Reviewed Date:  review in clinic    Thrombosis Prevention: Aspirin 81 mg PO daily  Scheduled Discontinue Date: review in clinic    Blood Pressure Management  Frequency of home Blood Pressure checks: once daily  Most recent home BP: 126/83   Patient Blood pressure goal: <140/90  Patient blood pressure at goal:  yes  Hospitalizations/ER visits since last assessment: 0      Med rec/DUR performed: yes  Med Rec Discrepancies: no    Reminders:    1. Bring to first clinic appt: med box, med card, bp monitor, all medications being taken, and lab book.  2.   MTM pharmacist visit on first clinic appt and if ok, again in 3 to 4 months during follow up appt.  3.   Avoid Grapefruit and Grapefruit juice.   4.   Avoid herbal supplements. If wish to take other medications or supplements, call your coordinator.   5.   Keep lab appts.   6.   Can use apps on phone like [employer] to help manage medication lists and reminders.   7.   Make sure you are protecting your skin by wearing long sleeves and applying sunscreen to exposed skin, for any significant time in the sun.     Transplant Coordinator is Edwina Conde, Pharm.D.  [facility]  [phone]

## 2022-11-08 NOTE — PROGRESS NOTES
CLINICAL NUTRITION SERVICES - Brief NOTE     Nutrition Prescription    RECOMMENDATIONS FOR MDs/PROVIDERS TO ORDER:  Recommend patient meets a minimum of 1150 kcals and 55 gram protein or would recommend nutrition support     Recommendations already ordered by Registered Dietitian (RD):  Calorie counts     Future/Additional Recommendations:  -- monitor calorie counts  -- Tube Feeding Recommendations:  Vital 1.5 Juan @ goal of  50ml/hr  (1200ml/day) + 1 pkt Prosource TF20 will provide: 1880 kcal (30 kcal/kg), 101 g PRO (1.6g/kg), 916 ml free H20, 224 g CHO, and 7 g fiber daily.  *Due to pancreatic insufficiency, use 1 RELIZORB cartridge every 24 hours with TF rate at 10-20 ml/hr.  Change 1 RELIZORB cartridge every 12 hours with TF rates >20 ml/hr.  RN: Obtain cartridges from med room and/or call [phone] ([facility]) and request peoplesoft #[phone]     EVALUATION OF THE PROGRESS TOWARD GOALS   Diet: Regular with magic cup BID  Intake:   Juan counts  11/5       Total Kcals: 329       Total Protein: 8g  11/6       Total Kcals: 330       Total Protein: 9g  11/7       Total Kcals: 0           Total Protein: 0g - per nursing documentation 25% of 1 meal (11/7). Per chart review drinking juice and soda     NEW FINDINGS   Labs (11/8): phos 1.6 mg/dL (L), Na 132 mmol/L (L), K+ 3.3 mmol/L (L), AST 63 U/L (H),  U/L (H), Mg++ 1.6 mg/dL (L)    Meds:   Creon 24 2 capsules with meals  Novolog  Lantus PEN   DEKAS plus  Miralax    INTERVENTIONS  Implementation  Collaboration with other providers - discussed with team. Resume calorie counts. Recommend FT/TF if oral intake remains decreased     Monitoring/Evaluation  Progress toward goals will be monitored and evaluated per protocol.    Marie Espinoza, MS/RD/LD  7A/Obs RD pager: [phone]  Weekend/Holiday RD pager: [phone]

## 2022-11-08 NOTE — PLAN OF CARE
"Vitals: /83 (BP Location: Right arm)   Pulse 62   Temp 98.6  F (37  C) (Oral)   Resp 18   Ht 1.676 m (5' 6\")   Wt 90.2 kg (198 lb 14.4 oz)   SpO2 96%   BMI 32.10 kg/m      Endocrine: Glucose 211, on Lantus, Novolog correction and carb coverage.  Labs: Magnesium, Potassium low, replaced. Stable labs.  Pain: Back, leg abdominal pain, headache. 2 Lidocaine patches on her back.  PRN's: Started on scheduled Gabapentin for leg pain, Oxycodone, Maxalt PRN  Diet: Regular diet, improving appetite.  LDA: PIV saline locked.  GI: BM overnight, soft, complains of gas discomfort. Took Miralax and Senna this morning.  : Concentrated urine, Lasix DC'd.  Skin: Bruising, abdominal incision with staples, old TERESITA sites leaking, dressings changed X3. Team notified, if not stitched, will need it bagged.   Neuro: Anxious, expresses frustration with staffing cares, oriented X4.  Mobility: Needs encouragement to be more independent, increase activities.  Education: Medications reviewed, transplant coordinator in to see.  Specialty Pharmacy will do education this afternoon.  Plan: Discharge home when medically stable.                          "

## 2022-11-08 NOTE — PROGRESS NOTES
"    Met with liver ANYA and discharge coordinator on 7A.  Discussed pertinent health issues related to liver transplant and discharge planning.  Upon discharge, pt will be going home, hopefully with home care.     Met with patient.  Introduced myself and explained the role of the post liver transplant coordinator and support team.      Briefly discussed the following topics:  1.) immunosuppression and the importance of taking regularly as directed.  2.) pertinent labs and their interpretation  3.) rejection signs / symptoms and treatment and 4.) Importance of long term follow-up with the transplant center and primary care physician 5.) presence of bililary stent placed at time of surgery,  need for patient to notify me if stent passes or need for removal if not seen 2-3 months post transplant  Has lab book and understands responsibility of getting and recording results. Education in process.  (See inpatient education teaching flowsheet). Is aware that they will have clinic visit 1 week and then again weekly x 3 after discharge from hospital or transitional care / rehab.  Is aware that she needs a follow-up appointment with pre transplant primary care within 1-2 weeks of returning home. Is aware that he will need follow-up with the transplant team for the rest of her life.  Initial follow-up will be with the transplant surgeon, then move to pre-transplant hepatologist.  Patient is aware that she will need lab testing, initially every Monday and Thursday to monitor liver function on a regular basis for the rest of her life.     Gave patient handouts entitled \"Things to Remember\" and \"Your Lab Results\" as well as a copy of contact numbers for myself, social work, transplant LPN and after hours/ weekend / holiday phone numbers.  Denies further questions at this time.      Organ specific education completed, and discharge planning has been conducted with multidisciplinary transplant care team including physicians, " pharmacy, nutrition, and social work.

## 2022-11-08 NOTE — PROGRESS NOTES
Care Management Follow Up    Length of Stay (days): 5    Expected Discharge Date: 11/10/2022     Concerns to be Addressed:       Patient plan of care discussed at interdisciplinary rounds: Yes    Anticipated Discharge Disposition: Home     Anticipated Discharge Services:    Anticipated Discharge DME:      Patient/family educated on Medicare website which has current facility and service quality ratings:    Education Provided on the Discharge Plan:    Patient/Family in Agreement with the Plan:      Referrals Placed by CM/SW:      Additional Information:  Pt status reviewed/discussed during care team rounds PT/OT indicate TCU vs progress to home with home care services.   Pt and spouse completed transplant pharmacy education today.      Met with pt and her spouse.  Per discussion with pt and spouse pt spouse just had surgery a week ago and is unable to provide any lifting assistance.  Pt will nee to be independent with stairs, transfers, mobility, dressing, grooming etc.  Pt and her spouse confirmed that they have friends but no one that can stay with them.  Writer reviewed home care services as well as TCU.   Agreed to follow up with therapies.   [facility] in [facility] past.   Pt and spouse are open to TCU or a home with home care plan but again final plan pending pt functional status.   Writer updated Juanita EVANS.      [facility] declined pt d/t insurance.   Will pursue home care options pending therapy review/recommendations.    Vinita Ansari, RN BSN, PHN, ACM-RN  7A RN Care Coordinator  Phone: [phone]  Pager [phone]    To contact the weekend RNCC  [place] (0800 [phone] 1630) Saturday and Sunday    Units: 4A, 4C, 4E, 5A and 5B- Pager 1: [phone]    Units: 6A, 6B, 6C, 6D- Pager 2: [phone]    Units: 7A, 7B, 7C, 7D, and 5C-Pager 3: [phone]    [facility] ([phone]) Saturday and Sunday    Units: 5 Ortho, 8A, 10 ICU, & Pediatric Units-Pager 4: [phone]    11/8/2022 3:37 PM

## 2022-11-08 NOTE — DISCHARGE SUMMARY
[facility]    Discharge Summary  Transplant Surgery    Date of Admission:  11/1/2022  Date of Discharge:  11/10/2022  Discharging Provider: Ange Sánchez NP / Mil Tejada MD    Discharge Diagnoses   Principal Problem:    Liver transplanted (H)  Active Problems:    Thrombocytopenia (H)    Major depressive disorder, recurrent episode, moderate (H)    Type 2 diabetes mellitus without complication, without long-term current use of insulin (H)    Primary hypothyroidism    H/O gastric bypass    Steroid-induced hyperglycemia    Hypomagnesemia    Hypervolemia    Immunosuppressed status (H)    Anemia in other chronic diseases classified elsewhere    Coagulopathy (H)    Hypoxia    Hyponatremia    Hypoalbuminemia    GEORGETTE (acute kidney injury) (H)    Acute post-operative pain    Seizure due to hypoglycemia (H)    Migraine    Moderate malnutrition (H)    Pancreatic insufficiency    Constipation    Hypophosphatemia    History of Present Illness   Susan Puckett is an 52 year old female with PMH significant obesity s/p Faustino-en-Y, DM2, HTN, CUETO cirrhosis c/b HE, EV, Hepatic hydrothorax. She is s/p OLTx 11/2 with Dr. Delbert Morgan.    Hospital Course   S/p DDLT 11/2/22: POD#8. Bilirubin WNL, Alk Phos and transaminases trending down. US patent with ongoing elevated RIs.     Immunosuppression management:  Induction: Steroid taper per protocol.  Maintenance:   -Tacrolimus 6 mg BID. Goal level 8-12.    - mg BID  Infection prophylaxis: PCP (Bactrim x 6 months), viral (Valcyte x 12 weeks)    Transplant coordinator: Edwina Fallon [phone].  Biliary stent:  No  Donor status:  DBD  CMV D - / R +  EBV D + / R +  Anticoagulation plan: ASA 81 mg daily x 6 months    Hematology: Pancytopenia due to liver disease.  Acute blood loss/Anemia of chronic disease: Hgb stable 9-10. Monitor.  Thrombocytopenia: due to liver disease. Plts 136, improving.  Coagulopathy: due to liver disease. Last  INR 1.3, improving.     Neurology/Psych:  Acute postoperative pain: Difficult to control post operatively. Will discharge on oxycodone 5mg Q4H PRN, Robaxin 500mg QID, Atarax PRN, gabapentin BID and lidocaine patches.  Hx Hypoglycemic seizures: Continue Topamax.   Hx Depression: Continue Lexapro, Trazadone.   Hx Migraine: PTA Rizatriptan PRN.     GI/Nutrition:   Hx Gastric Bypass; Moderate malnutrition in the context of acute on chronic illness: Nutrition consulted. Continue Regular diet with supplements. Appetite improving. Continue SAMI multivitamin.   Hx Pancreatic insufficiency: PTA Creon.  Constipation: Continue bowel regimen of Senna and Miralax.    Endocrine:   Steroid induced hyperglycemia/DM2: on Januvia and insulin PTA. Started insulin gtt initially post operatively. Transitioned to Lantus 12 units daily, sliding scale Novolog and carb coverage.   Hx Hypothyroidism: Continue PTA Synthroid.    Fluid/Electrolytes/Neph:   Hypervolemia associated with renal insufficiency, hypoalbuminemia: Weight Up 13kg. Continue lasix.   GEORGETTE: SCr peaked at 1.75, now back to baseline 0.9. Resolved.  Hypomagnesemia: Continue Mag-ox 400 mg BID.  Hypophosphatemia: Continue PO Phos 500 mg BID.     Infectious disease:   Covid +: PCR positive 11/1. Cycle threshold =39.7, considered covid recovered.       Significant Results and Procedures   Procedure date:  11/02/22    Preoperative diagnosis:  End Stage Liver Disease due to CUETO    Postoperative diagnosis:  Same    Procedure:  1. Donation after Brain Death Piggyback liver transplant   2. End-to-end Choledochocholedochostomy     Surgeon  Surgeon(s) and Role:     * Delbert Morgan MD - Primary     * Sunday Ricci MD - Fellow - Assisting     * Paulie Miranda MD - Fellow - Assisting    Co-Surgeon:      Fellow/Assistant:  Paulie Miranda, fellow, Santi Jacobo, Fellow. There was no qualified resident to assist with this procedure.     Anesthesia:  General    Specimen:  none    Drains:   JPx2    Urine output:  1350 mls    Estimated blood loss:  1400    Fluids administered:       Pending Results   These results will be followed up by transplant coordinator   Unresulted Labs Ordered in the Past 30 Days of this Admission       Date and Time Order Name Status Description    11/10/2022  7:26 AM Tacrolimus by Tandem Mass Spectrometry In process     11/1/2022 10:13 PM Prepare plasma (unit) Preliminary     11/1/2022 10:13 PM Prepare plasma (unit) Preliminary     11/1/2022 10:13 PM Prepare plasma (unit) Preliminary     11/1/2022 10:13 PM Prepare red blood cells (unit) Preliminary     11/1/2022 10:13 PM Prepare red blood cells (unit) Preliminary     11/1/2022 10:13 PM Prepare red blood cells (unit) Preliminary     11/1/2022 10:13 PM Prepare red blood cells (unit) Preliminary     11/1/2022 10:13 PM Prepare red blood cells (unit) Preliminary             Code Status   Full    Primary Care Physician   Harleen Billy    Physical Exam   Temp: 97.8  F (36.6  C) Temp src: Oral BP: 107/71 Pulse: 68   Resp: 16 SpO2: 99 % O2 Device: None (Room air)    Vitals:    11/08/22 0106 11/09/22 1016 11/10/22 0816   Weight: 90.2 kg (198 lb 14.4 oz) 90 kg (198 lb 6.4 oz) 89.5 kg (197 lb 5 oz)     Vital Signs with Ranges  Temp:  [97.8  F (36.6  C)-98.3  F (36.8  C)] 97.8  F (36.6  C)  Pulse:  [58-69] 68  Resp:  [16-18] 16  BP: (102-124)/(67-81) 107/71  SpO2:  [97 %-100 %] 99 %  I/O last 3 completed shifts:  In: -   Out: 500 [Urine:500]    General Appearance: in no apparent distress.  Skin: normal, warm, dry, No rashes, induration, or jaundice  Heart: regular rate and rhythm,   Lungs: NLB on room air  Abdomen: slightly distended, rounded, appropriately tender to palpation, incision with staples CDI.   : Voiding  Extremities: edema: present bilateral. 2+.   Neurologic: A&Ox3. CNS intact to RLE.  Neha independently. Tremor absent. Asterixis: absent.       Time Spent on this Encounter   I, Ange Sánchez NP, personally saw the  patient today and spent greater than 30 minutes discharging this patient.    Discharge Disposition   Discharged to rehab   Condition at discharge: Stable    Consultations This Hospital Stay   SOCIAL WORK IP CONSULT  NUTRITION SERVICES ADULT IP CONSULT  PHYSICAL THERAPY ADULT IP CONSULT  OCCUPATIONAL THERAPY ADULT IP CONSULT  NURSING TO CONSULT FOR VASCULAR ACCESS CARE IP CONSULT  NURSING TO CONSULT FOR VASCULAR ACCESS CARE IP CONSULT  CARE MANAGEMENT / SOCIAL WORK IP CONSULT  NUTRITION SERVICES ADULT IP CONSULT  OCCUPATIONAL THERAPY ADULT IP CONSULT  PHYSICAL THERAPY ADULT IP CONSULT  PHARMACY IP CONSULT  SOT MEDICATION HISTORY IP PHARMACY CONSULT  NEPHROLOGY KIDNEY/PANCREAS TRANSPLANT ADULT IP CONSULT  NURSING TO CONSULT FOR VASCULAR ACCESS CARE IP CONSULT  PSYCHOLOGY ADULT IP CONSULT  PHYSICAL THERAPY ADULT IP CONSULT  OCCUPATIONAL THERAPY ADULT IP CONSULT    Discharge Orders      General info for SNF    Length of Stay Estimate: Short Term Care: Estimated # of Days <30  Condition at Discharge: Improving  Level of care:skilled   Rehabilitation Potential: Good  Admission H&P remains valid and up-to-date: Yes  Recent Chemotherapy: N/A  Use Nursing Home Standing Orders: Yes     Follow Up and recommended labs and tests    Upon discharge from rehab, You will be seen in the clinic for follow up per protocol. DO NOT take  tacrolimus (Prograf) until after your labs are drawn. Remember to bring all of your medications with you to this appointment.      LABS:  Transplant labs (CBC, BMP, hepatic panel, mag, phos, and  tacrolimus levels (12 hours after administration)) to be drawn every Monday and Thursday for 4 weeks.    FOLLOW UP APPOINTMENTS after discharge from Rehab  Remember to always bring an updated medication list to all appointments.     Follow up in the Transplant Clinic weekly for the next 5 weeks with Dr. Delbert Morgan (or any available liver transplant surgeon).  Follow up with transplant hepatology in 3  months.  Follow up with primary care provider in 1 weeks to manage diabetes management. (Pt to schedule)  Your staples will be removed 3 weeks after your operation.  You have a biliary stent in place.  Your coordinator will follow up in 6-8 weeks.  Call scheduling at [phone] if you have not heard about your appointments within 48 hours after discharge.     Reason for your hospital stay    Patient Active Problem List:     Anxiety     Ascites     Benign tumor of colon     Brow ptosis, bilateral     Chronic diarrhea     Chronic peritoneal effusion     Colitis     Endometriosis     Hepatic encephalopathy     History of gastric bypass     Insomnia     Hypothyroidism     HSV-1 (herpes simplex virus 1) infection     Iron deficiency anemia     Lactate blood increase     Raynaud phenomenon     Ptosis of eyelid     Pleural effusion associated with hepatic disorder     Hydrothorax     Thrombocytopenia (H)     Other headache syndrome     Liver cirrhosis secondary to CUETO (H)     Non-alcoholic cirrhosis (H)     Nausea     Moderate dehydration     Major depressive disorder, recurrent episode, moderate (H)     Infertility management     History of gastric ulcer     Allergic rhinitis     Tubular adenoma of colon     Vitamin D deficiency     Visual field defect     Type 2 diabetes mellitus without complication, without long-term current use of insulin (H)     Primary hypothyroidism     H/O gastric bypass     CUETO (nonalcoholic steatohepatitis)     Exposure to COVID-19 virus     Abdominal pain, epigastric     Steroid-induced hyperglycemia     Cirrhosis of liver without ascites, unspecified hepatic cirrhosis type (H)     Decompensated hepatic cirrhosis (H)     Altered mental status, unspecified altered mental status type     Hypokalemia     Hypomagnesemia     Prolonged Q-T interval on ECG     Confusion     Liver transplanted (H)     Acute kidney failure with tubular necrosis (H)     Hypervolemia     Immunosuppressed status (H)      Anemia in other chronic diseases classified elsewhere     Coagulopathy (H)     Hypoxia     Hyponatremia     Hypoalbuminemia     GEORGETTE (acute kidney injury) (H)     Acute post-operative pain     Seizure due to hypoglycemia (H)     Migraine     Moderate malnutrition (H)     Pancreatic insufficiency     Constipation     Hypophosphatemia     Glucose monitor nursing POCT    Before meals and at bedtime     Daily weights    Call Provider for weight gain of more than 2 pounds per day or 5 pounds per week.     Wound care    Instructions to care for your wound at home:keep incision clean and dry. Ok to shower. Do not scrub incision.  Pat dry.  Do not soak or submerge in baths, hot tubs, or pools. Do not apply lotions or powders to incision.     Activity - Up with assistive device     Additional Discharge Instructions    WHEN TO CONTACT YOUR  COORDINATOR:  Transplant Coordinator [phone]  Notify your coordinator if you have pain over your liver, increased redness or drainage from your incision, fever greater than 100.5F, or decreased urine output.  Notify your coordinator immediately if you are ever unable to take your immunosuppressive medications for any reason.  If it is outside of office hours, please call the hospital switchboard at [phone] and ask to have the  liver transplant surgery fellow paged for urgent medical questions, or present to the emergency department.     Full Code     Physical Therapy Adult Consult    Evaluate and treat as clinically indicated.    Reason:   weakness/ deconditioning     Occupational Therapy Adult Consult    Evaluate and treat as clinically indicated.    Reason:  weakness/deconditioning     Fall precautions     Diet    Diet recommendations post-transplant:   Orders Placed This Encounter      Regular Diet Adult      Heart healthy dietary habits long term (low saturated/trans fat, low sodium). High protein diet x 8 weeks. Practice food safety precautions.        Discharge Medications    Current Discharge Medication List        START taking these medications    Details   aspirin (ASA) 81 MG chewable tablet Take 1 tablet (81 mg) by mouth daily    Associated Diagnoses: Liver replaced by transplant (H)      gabapentin (NEURONTIN) 100 MG capsule Take 2 capsules (200 mg) by mouth daily    Associated Diagnoses: Acute post-operative pain      hydrOXYzine (ATARAX) 25 MG tablet Take 1-2 tablets (25-50 mg) by mouth every 6 hours as needed for other (adjuvant pain)    Associated Diagnoses: Liver replaced by transplant (H)      !! insulin aspart (NOVOLOG PEN) 100 UNIT/ML pen Inject 1-10 Units Subcutaneous 3 times daily (before meals)  Qty: 15 mL    Associated Diagnoses: Type 2 diabetes mellitus without complication, without long-term current use of insulin (H)      !! insulin aspart (NOVOLOG PEN) 100 UNIT/ML pen Inject 1-7 Units Subcutaneous At Bedtime  Qty: 15 mL    Associated Diagnoses: Type 2 diabetes mellitus without complication, without long-term current use of insulin (H)      !! insulin aspart (NOVOLOG PEN) 100 UNIT/ML pen Inject 1-5 Units Subcutaneous 3 times daily (with meals)  Qty: 15 mL    Associated Diagnoses: Type 2 diabetes mellitus without complication, without long-term current use of insulin (H)      !! insulin aspart (NOVOLOG PEN) 100 UNIT/ML pen Inject 1-5 Units Subcutaneous Take with snacks or supplements for high blood sugar  Qty: 15 mL    Associated Diagnoses: Type 2 diabetes mellitus without complication, without long-term current use of insulin (H)      Lidocaine (LIDOCARE) 4 % Patch Place 2-3 patches onto the skin every 24 hours To prevent lidocaine toxicity, patient should be patch free for 12 hrs daily.    Associated Diagnoses: Acute post-operative pain      magnesium oxide (MAG-OX) 400 MG tablet Take 1 tablet (400 mg) by mouth 2 times daily    Associated Diagnoses: Hypomagnesemia      methocarbamol (ROBAXIN) 500 MG tablet Take 1 tablet (500 mg) by mouth 4 times daily for 2  days  Qty: 8 tablet, Refills: 0    Associated Diagnoses: Acute post-operative pain      mycophenolate (GENERIC EQUIVALENT) 250 MG capsule Take 3 capsules (750 mg) by mouth 2 times daily    Associated Diagnoses: Liver replaced by transplant (H)      oxyCODONE (ROXICODONE) 5 MG tablet Take 1 tablet (5 mg) by mouth every 6 hours as needed for moderate to severe pain  Refills: 0    Associated Diagnoses: Liver replaced by transplant (H)      pantoprazole (PROTONIX) 40 MG EC tablet Take 1 tablet (40 mg) by mouth daily    Associated Diagnoses: Liver replaced by transplant (H)      polyethylene glycol (MIRALAX) 17 GM/Dose powder Take 17 g by mouth daily  Qty: 510 g    Associated Diagnoses: Liver replaced by transplant (H)      potassium phosphate, monobasic, (K-PHOS) 500 MG tablet Take 1 tablet (500 mg) by mouth 2 times daily for 2 days  Qty: 4 tablet, Refills: 0    Associated Diagnoses: Hypophosphatemia      sennosides (SENOKOT) 8.6 MG tablet 2 tablets by Oral or Feeding Tube route 2 times daily    Associated Diagnoses: Liver replaced by transplant (H)      sulfamethoxazole-trimethoprim (BACTRIM) 400-80 MG tablet 1 tablet by Oral or Feeding Tube route daily    Associated Diagnoses: Liver replaced by transplant (H)      tacrolimus (GENERIC EQUIVALENT) 1 MG capsule Take 6 capsules (6 mg) by mouth 2 times daily    Associated Diagnoses: Liver replaced by transplant (H)      valGANciclovir (VALCYTE) 450 MG tablet Take 1 tablet (450 mg) by mouth daily    Associated Diagnoses: Liver replaced by transplant (H)       !! - Potential duplicate medications found. Please discuss with provider.        CONTINUE these medications which have CHANGED    Details   !! amylase-lipase-protease (CREON 24) 34979-16807 units CPEP per EC capsule Take 2 capsules by mouth 3 times daily (with meals)    Associated Diagnoses: Pancreatic insufficiency      !! amylase-lipase-protease (CREON 24) 02904-62071 units CPEP per EC capsule Take 1 capsule by mouth  Take with snacks or supplements (with snacks)  Qty: 90 capsule, Refills: 0    Associated Diagnoses: History of gastric bypass      insulin glargine (LANTUS PEN) 100 UNIT/ML pen Inject 12 Units Subcutaneous every morning (before breakfast)  Qty: 15 mL    Comments: If Lantus is not covered by insurance, may substitute Basaglar or Semglee or other insulin glargine product per insurance preference at same dose and frequency.    Associated Diagnoses: Type 2 diabetes mellitus without complication, without long-term current use of insulin (H)       !! - Potential duplicate medications found. Please discuss with provider.        CONTINUE these medications which have NOT CHANGED    Details   calcium carbonate-vitamin D (OSCAL W/D) 500-200 MG-UNIT tablet Take 1 tablet by mouth 2 times daily (with meals) Take one tab once in AM and once in PM with meals  Qty: 180 tablet, Refills: 3    Associated Diagnoses: Vitamin D deficiency; Cirrhosis of liver (H)      cyanocobalamin (VITAMIN B-12) 1000 MCG tablet Take 1,000 mcg by mouth every 7 days Take 1 tablet by mouth every 7 days on Wednesdays      escitalopram (LEXAPRO) 10 MG tablet Take 1 tablet (10 mg) by mouth daily  Qty: 60 tablet, Refills: 0    Associated Diagnoses: Anxiety      folic acid (FOLVITE) 1 MG tablet Take 1 mg by mouth every morning      levothyroxine (SYNTHROID/LEVOTHROID) 150 MCG tablet Take 1 tablet by mouth daily      melatonin 5 MG tablet Take 5 mg by mouth At Bedtime      multivitamin CF FORMULA (DEKAS PLUS) capsule Take 1 capsule by mouth daily  Qty: 90 capsule, Refills: 3    Associated Diagnoses: Cirrhosis of liver without ascites, unspecified hepatic cirrhosis type (H)      ondansetron (ZOFRAN ODT) 8 MG ODT tab Take 1 tablet by mouth every 8 hours as needed for nausea      topiramate (TOPAMAX) 50 MG tablet Take 50 mg by mouth daily       traZODone (DESYREL) 100 MG tablet Take 0.5 tablets (50 mg) by mouth At Bedtime      Continuous Blood Gluc  (DEXCOM  G6 ) LUNA Use as directed for continuous glucose monitoring.      Continuous Blood Gluc Sensor (DEXCOM G6 SENSOR) MISC Use as directed for continuous glucose monitoring.  Change sensor every 10 days.      Continuous Blood Gluc Transmit (DEXCOM G6 TRANSMITTER) MISC Use as directed for continuous glucose monitoring.  Change every 3 months.      insulin pen needle (BD GRISEL U/F) 32G X 4 MM miscellaneous Inject 1 each Subcutaneous           STOP taking these medications       acetaminophen (TYLENOL) 325 MG tablet Comments:   Reason for Stopping:         atorvastatin (LIPITOR) 20 MG tablet Comments:   Reason for Stopping:         CALCIUM CITRATE PO Comments:   Reason for Stopping:         famotidine (PEPCID) 20 MG tablet Comments:   Reason for Stopping:         furosemide (LASIX) 40 MG tablet Comments:   Reason for Stopping:         insulin aspart (NOVOPEN ECHO) 100 UNIT/ML cartridge Comments:   Reason for Stopping:         lactulose (CHRONULAC) 10 GM/15ML solution Comments:   Reason for Stopping:         pregabalin (LYRICA) 25 MG capsule Comments:   Reason for Stopping:         prochlorperazine (COMPAZINE) 10 MG tablet Comments:   Reason for Stopping:         rifampin (RIFADIN) 300 MG capsule Comments:   Reason for Stopping:         rizatriptan (MAXALT) 10 MG tablet Comments:   Reason for Stopping:         sitagliptin (JANUVIA) 100 MG tablet Comments:   Reason for Stopping:         spironolactone (ALDACTONE) 50 MG tablet Comments:   Reason for Stopping:         vitamin A 3 MG (93291 UNITS) capsule Comments:   Reason for Stopping:         vitamin C (ASCORBIC ACID) 1000 MG TABS Comments:   Reason for Stopping:         vitamin D3 (CHOLECALCIFEROL) 50 mcg (2000 units) tablet Comments:   Reason for Stopping:         vitamin E (TOCOPHEROL) 400 units (180 mg) capsule Comments:   Reason for Stopping:         XIFAXAN 550 MG TABS tablet Comments:   Reason for Stopping:             Allergies   Allergies   Allergen Reactions     Methylprednisolone Hives     Per patient, reaction occurred in 1999 or earlier. Broke out in round, flat hives in neck and chest area. No shortness of breath or swelling. Unknown if reaction occurred immediately after administration.     Droperidol Anxiety    Nsaids Other (See Comments)     GI bleed.    Prednisone Anxiety, Hives and Rash     Data   Most Recent 3 CBC's:  Recent Labs   Lab Test 11/10/22  0936 11/09/22  0647 11/08/22  0747   WBC 4.7 3.6* 4.1   HGB 10.0* 9.7* 9.5*   MCV 98 98 95   * 92* 70*      Most Recent 3 BMP's:  Recent Labs   Lab Test 11/10/22  1309 11/10/22  0936 11/10/22  0823 11/09/22  0935 11/09/22  0647 11/08/22  0851 11/08/22  0747   NA  --  133*  --   --  130*  --  132*   POTASSIUM  --  4.3  --   --  3.7  --  3.3*   CHLORIDE  --  100  --   --  97*  --  97*   CO2  --  25  --   --  22  --  24   BUN  --  16.6  --   --  14.4  --  16.8   CR  --  0.99*  --   --  0.95  --  1.09*   ANIONGAP  --  8  --   --  11  --  11   MAURI  --  7.6*  --   --  7.1*  --  6.7*   * 177* 146*   < > 189*   < > 220*    < > = values in this interval not displayed.     Most Recent 2 LFT's:  Recent Labs   Lab Test 11/10/22  0936 11/09/22  0647   AST 39* 66*   * 134*   ALKPHOS 256* 224*   BILITOTAL 0.7 0.9     Most Recent INR's and Anticoagulation Dosing History:  Anticoagulation Dose History       Recent Dosing and Labs Latest Ref Rng & Units 11/2/2022 11/2/2022 11/2/2022 11/2/2022 11/3/2022 11/3/2022 11/4/2022    INR 0.85 - 1.15 1.94(H) 1.88(H) 1.71(H) 1.58(H) 1.43(H) 1.33(H) 1.32(H)          Most Recent 3 Troponin's:  Recent Labs   Lab Test 11/22/20  1406   TROPI <0.015     Most Recent Cholesterol Panel:  Recent Labs   Lab Test 10/19/20  0725   TRIG 53     Most Recent 6 Bacteria Isolates From Any Culture (See EPIC Reports for Culture Details):  Recent Labs   Lab Test 11/22/20 1957 11/22/20 1952 11/22/20  1727 10/25/20  1315   CULT No growth No growth 10,000 to 50,000 colonies/mL  mixed urogenital  durga  Susceptibility testing not routinely done   >100,000 colonies/mL  Escherichia coli  *     Most Recent TSH, T4 and A1c Labs:  Recent Labs   Lab Test 11/02/22  1450 02/18/22  0555 02/15/22  0049   TSH  --   --  0.06*   T4  --   --  2.26*   A1C 5.7*   < >  --     < > = values in this interval not displayed.     Attestation:    The patient has been seen with team and evaluated by me.   Vital signs, labs, medications and orders were reviewed.   When obtained, diagnostic images were reviewed by me and interpreted as above.    The care plan was discussed with the multidisciplinary team and I agree with the findings and plan in this note, with any differences recorded in blue.    Immunosuppressive medication management was reviewed and adjusted as reflected in the note and orders.     .

## 2022-11-08 NOTE — PROGRESS NOTES
Transplant Surgery  Inpatient Daily Progress Note  11/08/2022    Assessment & Plan: Susan Puckett is a 52 year old female with PMH significant obesity s/p Faustino-en-Y, DM, HTN, CUETO cirrhosis c/b HE, EV, Hepatic hydrothorax. She is now s/p OLTx 11/2 with Dr. Delbert Morgan.    Graft function:  Liver transplant: POD # 6. Liver transaminases trended down, plateaued.  -JPs: removed lateral drain on 11/4, removed last drain on 11/5.   Graft patency ppx: ASA 81 daily  Liver US: POD #0, Patent doppler, Elevated RI=1 in HA  Repeat US POD #1, unchanged elevated RI in HA  Repeat US POD #2, continued elevated RIs, slightly improved flow in the left hepatic artery   Immunosuppression management:  Steroid taper induction  Maintenance:  Tac 6mg BID (goal 8-12) level 8.3 today.   MMF 750mg BID  Complexity of management:High. Contributing factors:  induction , Flucon x2, now completed.   Hematology:   Acute blood loss/Anemia of chronic disease: Hgb stable, 9-10  Thrombocytopenia: due to liver disease, Plts 70  Coagulopathy: due to liver disease, INR 1.3 on 11/4. Received 1U cryo overnight on 11/3.  Neurology:  Acute postoperative pain:uncontrolled. Continue Oxycodone 5mg Q4, Robaxin 500mg QID, Atarax PRN. add lidocaine patches.  Right thigh pain:  US 11/6 no DVT.  Hx of hypoglycemic seizures: continue topamax  Anxiety/Depression: Continue lexapro, Trazadone, atarax PRN  Cardiorespiratory:   Hypoxia: Resolved, on room air. Encourage CDB/IS  GI/Nutrition: At risk for malnutrition:  Reg diet +supplements as tolerated, minimal intake. RD following.   Hx of Gastric bypass:  SAMI mvi  Pancreatic insufficiency: on Creon PTA, restart.   Constipation: Reporting abdominal bloating. Continue bowel regimen senna and miralax. Dulcolax suppos PRN.  Endocrine:   Steroid induced hyperglycemia/DM: on Januvia and insulin PTA, started insulin gtt initially post operatively. Discontinued insulin gtt and transitioned to ssi prn, continue Lantus 10 units  daily, increase to 12. Start Carb coverage. Monitor closely with steroid tapered off   Hypothyroid: continue synthroid   Fluid/Electrolytes:   Hypervolemia associated with renal insufficiency: Weight Up 11kg. DC lasix today.   Hypoalbuminemia: Albumin 3.    GEORGETTE: Improving, SCr peaked at 1.75, (0.9 at baseline). 1 today.DC Lasix  .   Hypomagnesemia: Mag ox 400 mg BID. Supplement PRN  Hypokalemia: improved after supplement, K 3.3., supplement 20meq  Hyponatremia: Na 132  Hypercalcemia: start oscal BID  Hypophosphatemia: Phos 1.6, start kphos BID   : Rossi removed, continue strict I&Os   Infectious disease: Afebrile, WBC normal  Covid + 11/1: Cycle threshold =39.7, covid recovered     Prophylaxis: DVT(SCDs, GI (PPI), fungal (diflucan x1, nystatin), PCP ppx (bactrim), CMV ppx (valcyte)   Periop ppx zosyn x48hrs,   Disposition: 7A, PT/OT    Medical Decision Making: Medium  Subsequent visit 98800 (moderate level decision making)    ANYA/Fellow/Resident Provider: Ange Sánchez NP        Faculty: Mil Tejada M.D.      __________________________________________________________________  Transplant History: Admitted 11/1/2022 for Liver transplant .    11/2/2022 (Liver), Postoperative day: 6     Interval History: History is obtained from the patient    Overnight events: Pain not well controlled but patient awake and alert and reporting ambulate in halls .c/o constipation pain. Continued nerve pain to RLE. RADHA -bilaterally. Leaking from old TERESITA site.     ROS:   A 10-point review of systems was negative except as noted above.    Curent Meds:   amylase-lipase-protease  2 capsule Oral TID w/meals    aspirin  81 mg Oral Daily    escitalopram  10 mg Oral or Feeding Tube Daily    furosemide  80 mg Intravenous BID    insulin aspart   Subcutaneous TID w/meals    insulin aspart  1-10 Units Subcutaneous TID AC    insulin aspart  1-7 Units Subcutaneous At Bedtime    insulin glargine  10 Units Subcutaneous QAM AC    levothyroxine  " 150 mcg Oral or Feeding Tube QAM AC    lidocaine  2 patch Transdermal Q24H    lidocaine  2-3 patch Transdermal Q24H    lidocaine   Transdermal Q8H JULIEN    lidocaine   Transdermal Q8H JULIEN    magnesium oxide  400 mg Oral BID    methocarbamol  500 mg Oral 4x Daily    multivitamin CF FORMULA  1 capsule Oral Daily    mycophenolate  750 mg Oral BID IS    nystatin  500,000 Units Oral 4x Daily    pantoprazole  40 mg Oral Daily    polyethylene glycol  17 g Oral Daily    sennosides  2 tablet Oral or Feeding Tube BID    sodium chloride (PF)  3 mL Intravenous Q8H    sulfamethoxazole-trimethoprim  1 tablet Oral or Feeding Tube Daily    tacrolimus  6 mg Oral BID IS    topiramate  50 mg Oral or Feeding Tube Daily    traZODone  50 mg Oral or Feeding Tube At Bedtime    valGANciclovir  450 mg Oral Daily       Physical Exam:     Admit Weight: 79.9 kg (176 lb 1.6 oz)    Current Vitals:   /75 (BP Location: Right arm)   Pulse 60   Temp 98  F (36.7  C) (Oral)   Resp 18   Ht 1.676 m (5' 6\")   Wt 90.2 kg (198 lb 14.4 oz)   SpO2 100%   BMI 32.10 kg/m      Vital sign ranges:    Temp:  [98  F (36.7  C)-99.4  F (37.4  C)] 98  F (36.7  C)  Pulse:  [60-71] 60  Resp:  [16-18] 18  BP: (103-125)/(70-78) 110/75  SpO2:  [96 %-100 %] 100 %  Patient Vitals for the past 24 hrs:   BP Temp Temp src Pulse Resp SpO2 Weight   11/08/22 0535 110/75 98  F (36.7  C) Oral 60 18 100 % --   11/08/22 0106 125/72 98.1  F (36.7  C) Oral 61 18 100 % 90.2 kg (198 lb 14.4 oz)   11/07/22 2217 113/70 99.4  F (37.4  C) Oral 61 18 100 % --   11/07/22 1832 117/73 98.1  F (36.7  C) Oral 60 18 100 % --   11/07/22 1335 103/71 98.4  F (36.9  C) Oral 70 16 96 % --   11/07/22 1059 122/78 98.1  F (36.7  C) Oral 71 18 100 % --     General Appearance/psych: in mild distress due to anxiety, tearful at times.  Skin: normal, warm, No rashes, induration, or jaundice, see abd.  Heart: regular rate and rhythm,   Lungs: NLB on room air  Abdomen: slightly distended, rounded, " appropriately tender to palpation, incision with staples CDI. Old vishal site leaking serous drainage-suture applied  : Voiding  Extremities: edema: present bilateral. 2+.   Neurologic: A&Ox3. CNS intact to RLE.  Neha independently. Tremor absent. Asterixis: absent.    Frailty Scores       Frailty Scores 1/6/2022 10/18/2020    Final Score Frail Not Frail    Final Score Number 3 2            Data:   CMP  Recent Labs   Lab 11/08/22  0259 11/07/22  2218 11/07/22  1243 11/07/22  0830 11/06/22  0756 11/06/22  0618 11/03/22  0452 11/03/22  0358 11/02/22  1625 11/02/22  1434 11/02/22  0955 11/01/22 2038   NA  --   --   --  133*  --  136   < > 143   < > 142   < > 149*   POTASSIUM  --   --   --  3.9  --  3.2*   < > 3.9   < > 2.9*   < > 4.1   CHLORIDE  --   --   --  98  --  100   < > 107   < >  --   --  117*   CO2  --   --   --  20*  --  22   < > 24   < >  --   --  17*   * 219*   < > 284*   < > 211*   < > 149*   < > 263*   < > 130*   BUN  --   --   --  23.4*  --  30.2*   < > 17.1   < >  --   --  9.9   CR  --   --   --  1.06*  --  1.25*   < > 0.87   < >  --   --  0.93   GFRESTIMATED  --   --   --  64  --  52*   < > 81   < >  --   --  75   MAURI  --   --   --  7.5*  --  7.5*   < > 8.8   < >  --   --  8.6   ICAW  --   --   --   --   --   --   --  4.7  --  5.3*   < >  --    MAG  --   --   --  1.6*  --  1.6*   < > 2.0  --   --   --  1.7   PHOS  --   --   --  2.0*  --  3.0   < > 4.6*  --   --   --  3.3   AMYLASE  --   --   --   --   --   --   --  65  --   --   --  45   LIPASE  --   --   --   --   --   --   --  6*  --   --   --   --    ALBUMIN  --   --   --  3.3*  --  3.1*   < > 3.2*   < >  --   --  2.7*   BILITOTAL  --   --   --  0.9  --  0.7   < > 1.2   < >  --   --  1.5*   ALKPHOS  --   --   --  138*  --  94   < > 48   < >  --   --  86   AST  --   --   --  69*  --  70*   < > 479*   < >  --   --  46*   ALT  --   --   --  152*  --  159*   < > 371*   < >  --   --  7*    < > = values in this interval not displayed.      CBC  Recent Labs   Lab 11/07/22  0830 11/06/22  0618 11/02/22  1625 11/02/22  1450   HGB 10.1* 9.1*   < > 9.8*   WBC 6.0 4.5   < >  --    PLT 69* 69*   < > 96*   A1C  --   --   --  5.7*    < > = values in this interval not displayed.     COAGS  Recent Labs   Lab 11/04/22  0607 11/03/22  0850 11/02/22  1815 11/02/22  1625 11/02/22  1450   INR 1.32* 1.33*   < > 1.94* 2.79*   PTT  --   --   --  40* 65*    < > = values in this interval not displayed.      Urinalysis  Recent Labs   Lab Test 11/05/22  0928 11/01/22  2133   COLOR Light Yellow Yellow   APPEARANCE Clear Clear   URINEGLC Negative Negative   URINEBILI Negative Negative   URINEKETONE 10* Negative   SG 1.026 1.015   UBLD Negative Negative   URINEPH 5.5 7.0   PROTEIN 20* Negative   NITRITE Negative Negative   LEUKEST Negative Negative   RBCU 1 1   WBCU 3 1     Virology:  Hepatitis C Antibody   Date Value Ref Range Status   11/01/2022 Nonreactive Nonreactive Final     Attestation:    The patient has been seen with team and evaluated by me.   Vital signs, labs, medications and orders were reviewed.   When obtained, diagnostic images were reviewed by me and interpreted as above.    The care plan was discussed with the multidisciplinary team and I agree with the findings and plan in this note, with any differences recorded in blue.    Immunosuppressive medication management was reviewed and adjusted as reflected in the note and orders.     .

## 2022-11-08 NOTE — PLAN OF CARE
"/75 (BP Location: Right arm)   Pulse 60   Temp 98  F (36.7  C) (Oral)   Resp 18   Ht 1.676 m (5' 6\")   Wt 90.2 kg (198 lb 14.4 oz)   SpO2 100%   BMI 32.10 kg/m       [id number]  Neuro: Pt. alert & Ox4  Behavior: Pt. anxious at times, cooperative with cares.   Activity: Pt. up with 1 & walker.  BG: ACHS. 2am 226  Vital: AVSS on RA  LDAs: Left PIV SL  Cardiac: WNL  Respiratory: LS diminished in bases  GI/: Pt. voiding frequently d/t Lasix, 1 stool this shift.   Skin: Incision stapled & TRACEY. Old TERESITA site leaking & dressing changed x1.  Pain/Nausea: Abdominal pain managed with prn Oxycodone, Atarax, sched. Robaxin. PRN Maxalt for headache.  Nausea relieved with prn IV Zofran.   Diet: Regular with faye. counts  Labs/Imaging: Morning labs pending.  Plan: Continue to follow POC & notify MD with change in status.                           "

## 2022-11-09 ENCOUNTER — APPOINTMENT (OUTPATIENT)
Dept: OCCUPATIONAL THERAPY | Facility: CLINIC | Age: 52
DRG: 005 | End: 2022-11-09
Attending: TRANSPLANT SURGERY
Payer: COMMERCIAL

## 2022-11-09 ENCOUNTER — APPOINTMENT (OUTPATIENT)
Dept: PHYSICAL THERAPY | Facility: CLINIC | Age: 52
DRG: 005 | End: 2022-11-09
Attending: TRANSPLANT SURGERY
Payer: COMMERCIAL

## 2022-11-09 LAB
ALBUMIN SERPL BCG-MCNC: 2.7 G/DL (ref 3.5–5.2)
ALP SERPL-CCNC: 224 U/L (ref 35–104)
ALT SERPL W P-5'-P-CCNC: 134 U/L (ref 10–35)
ANION GAP SERPL CALCULATED.3IONS-SCNC: 11 MMOL/L (ref 7–15)
AST SERPL W P-5'-P-CCNC: 66 U/L (ref 10–35)
BASOPHILS # BLD AUTO: 0 10E3/UL (ref 0–0.2)
BASOPHILS NFR BLD AUTO: 0 %
BILIRUB DIRECT SERPL-MCNC: 0.44 MG/DL (ref 0–0.3)
BILIRUB SERPL-MCNC: 0.9 MG/DL
BUN SERPL-MCNC: 14.4 MG/DL (ref 6–20)
CALCIUM SERPL-MCNC: 7.1 MG/DL (ref 8.6–10)
CHLORIDE SERPL-SCNC: 97 MMOL/L (ref 98–107)
CREAT SERPL-MCNC: 0.95 MG/DL (ref 0.51–0.95)
DEPRECATED HCO3 PLAS-SCNC: 22 MMOL/L (ref 22–29)
EOSINOPHIL # BLD AUTO: 0.3 10E3/UL (ref 0–0.7)
EOSINOPHIL NFR BLD AUTO: 9 %
ERYTHROCYTE [DISTWIDTH] IN BLOOD BY AUTOMATED COUNT: 15.6 % (ref 10–15)
GFR SERPL CREATININE-BSD FRML MDRD: 73 ML/MIN/1.73M2
GLUCOSE BLDC GLUCOMTR-MCNC: 160 MG/DL (ref 70–99)
GLUCOSE BLDC GLUCOMTR-MCNC: 164 MG/DL (ref 70–99)
GLUCOSE BLDC GLUCOMTR-MCNC: 173 MG/DL (ref 70–99)
GLUCOSE BLDC GLUCOMTR-MCNC: 176 MG/DL (ref 70–99)
GLUCOSE BLDC GLUCOMTR-MCNC: 289 MG/DL (ref 70–99)
GLUCOSE BLDC GLUCOMTR-MCNC: 310 MG/DL (ref 70–99)
GLUCOSE SERPL-MCNC: 189 MG/DL (ref 70–99)
HCT VFR BLD AUTO: 29.8 % (ref 35–47)
HGB BLD-MCNC: 9.7 G/DL (ref 11.7–15.7)
IMM GRANULOCYTES # BLD: 0 10E3/UL
IMM GRANULOCYTES NFR BLD: 1 %
LYMPHOCYTES # BLD AUTO: 0.8 10E3/UL (ref 0.8–5.3)
LYMPHOCYTES NFR BLD AUTO: 22 %
MAGNESIUM SERPL-MCNC: 2.1 MG/DL (ref 1.7–2.3)
MCH RBC QN AUTO: 31.8 PG (ref 26.5–33)
MCHC RBC AUTO-ENTMCNC: 32.6 G/DL (ref 31.5–36.5)
MCV RBC AUTO: 98 FL (ref 78–100)
MONOCYTES # BLD AUTO: 0.2 10E3/UL (ref 0–1.3)
MONOCYTES NFR BLD AUTO: 6 %
NEUTROPHILS # BLD AUTO: 2.3 10E3/UL (ref 1.6–8.3)
NEUTROPHILS NFR BLD AUTO: 62 %
NRBC # BLD AUTO: 0 10E3/UL
NRBC BLD AUTO-RTO: 0 /100
PHOSPHATE SERPL-MCNC: 1.8 MG/DL (ref 2.5–4.5)
PLATELET # BLD AUTO: 92 10E3/UL (ref 150–450)
POTASSIUM SERPL-SCNC: 3.7 MMOL/L (ref 3.4–5.3)
PROT SERPL-MCNC: 4.8 G/DL (ref 6.4–8.3)
RBC # BLD AUTO: 3.05 10E6/UL (ref 3.8–5.2)
SODIUM SERPL-SCNC: 130 MMOL/L (ref 136–145)
WBC # BLD AUTO: 3.6 10E3/UL (ref 4–11)

## 2022-11-09 PROCEDURE — 82248 BILIRUBIN DIRECT: CPT | Performed by: NURSE PRACTITIONER

## 2022-11-09 PROCEDURE — 250N000013 HC RX MED GY IP 250 OP 250 PS 637: Performed by: SURGERY

## 2022-11-09 PROCEDURE — 97535 SELF CARE MNGMENT TRAINING: CPT | Mod: GO

## 2022-11-09 PROCEDURE — 250N000013 HC RX MED GY IP 250 OP 250 PS 637

## 2022-11-09 PROCEDURE — 250N000013 HC RX MED GY IP 250 OP 250 PS 637: Performed by: NURSE PRACTITIONER

## 2022-11-09 PROCEDURE — 250N000011 HC RX IP 250 OP 636

## 2022-11-09 PROCEDURE — 250N000012 HC RX MED GY IP 250 OP 636 PS 637: Performed by: PHYSICIAN ASSISTANT

## 2022-11-09 PROCEDURE — 85025 COMPLETE CBC W/AUTO DIFF WBC: CPT | Performed by: NURSE PRACTITIONER

## 2022-11-09 PROCEDURE — 97530 THERAPEUTIC ACTIVITIES: CPT | Mod: GP | Performed by: REHABILITATION PRACTITIONER

## 2022-11-09 PROCEDURE — 84100 ASSAY OF PHOSPHORUS: CPT | Performed by: NURSE PRACTITIONER

## 2022-11-09 PROCEDURE — 250N000013 HC RX MED GY IP 250 OP 250 PS 637: Performed by: TRANSPLANT SURGERY

## 2022-11-09 PROCEDURE — 120N000011 HC R&B TRANSPLANT UMMC

## 2022-11-09 PROCEDURE — 250N000012 HC RX MED GY IP 250 OP 636 PS 637: Performed by: NURSE PRACTITIONER

## 2022-11-09 PROCEDURE — 250N000013 HC RX MED GY IP 250 OP 250 PS 637: Performed by: PHYSICIAN ASSISTANT

## 2022-11-09 PROCEDURE — 83735 ASSAY OF MAGNESIUM: CPT | Performed by: TRANSPLANT SURGERY

## 2022-11-09 PROCEDURE — 97116 GAIT TRAINING THERAPY: CPT | Mod: GP | Performed by: REHABILITATION PRACTITIONER

## 2022-11-09 PROCEDURE — 36415 COLL VENOUS BLD VENIPUNCTURE: CPT | Performed by: NURSE PRACTITIONER

## 2022-11-09 RX ORDER — DIPHENHYDRAMINE HYDROCHLORIDE, ZINC ACETATE 2; .1 G/100G; G/100G
CREAM TOPICAL 3 TIMES DAILY PRN
Status: DISCONTINUED | OUTPATIENT
Start: 2022-11-09 | End: 2022-11-09

## 2022-11-09 RX ADMIN — OXYCODONE HYDROCHLORIDE 5 MG: 5 TABLET ORAL at 18:03

## 2022-11-09 RX ADMIN — SENNOSIDES 2 TABLET: 8.6 TABLET, FILM COATED ORAL at 19:50

## 2022-11-09 RX ADMIN — SENNOSIDES 2 TABLET: 8.6 TABLET, FILM COATED ORAL at 09:28

## 2022-11-09 RX ADMIN — TACROLIMUS 6 MG: 5 CAPSULE ORAL at 18:03

## 2022-11-09 RX ADMIN — Medication: at 01:40

## 2022-11-09 RX ADMIN — MAGNESIUM OXIDE TAB 400 MG (241.3 MG ELEMENTAL MG) 400 MG: 400 (241.3 MG) TAB at 12:12

## 2022-11-09 RX ADMIN — OXYCODONE HYDROCHLORIDE 5 MG: 5 TABLET ORAL at 22:29

## 2022-11-09 RX ADMIN — HYDROXYZINE HYDROCHLORIDE 50 MG: 25 TABLET, FILM COATED ORAL at 16:52

## 2022-11-09 RX ADMIN — LIDOCAINE PATCH 4% 2 PATCH: 40 PATCH TOPICAL at 09:33

## 2022-11-09 RX ADMIN — MYCOPHENOLATE MOFETIL 750 MG: 250 CAPSULE ORAL at 09:31

## 2022-11-09 RX ADMIN — METHOCARBAMOL 500 MG: 500 TABLET ORAL at 19:50

## 2022-11-09 RX ADMIN — NYSTATIN 500000 UNITS: 100000 SUSPENSION ORAL at 09:28

## 2022-11-09 RX ADMIN — METHOCARBAMOL 500 MG: 500 TABLET ORAL at 09:30

## 2022-11-09 RX ADMIN — RIZATRIPTAN BENZOATE 10 MG: 10 TABLET ORAL at 21:26

## 2022-11-09 RX ADMIN — NYSTATIN 500000 UNITS: 100000 SUSPENSION ORAL at 16:44

## 2022-11-09 RX ADMIN — Medication 500 MG: at 12:12

## 2022-11-09 RX ADMIN — MYCOPHENOLATE MOFETIL 750 MG: 250 CAPSULE ORAL at 18:03

## 2022-11-09 RX ADMIN — SULFAMETHOXAZOLE AND TRIMETHOPRIM 1 TABLET: 400; 80 TABLET ORAL at 09:30

## 2022-11-09 RX ADMIN — POTASSIUM PHOSPHATE, MONOBASIC 500 MG: 500 TABLET, SOLUBLE ORAL at 09:28

## 2022-11-09 RX ADMIN — LEVOTHYROXINE SODIUM 150 MCG: 0.05 TABLET ORAL at 09:31

## 2022-11-09 RX ADMIN — OXYCODONE HYDROCHLORIDE 5 MG: 5 TABLET ORAL at 02:15

## 2022-11-09 RX ADMIN — VALGANCICLOVIR 450 MG: 450 TABLET, FILM COATED ORAL at 09:29

## 2022-11-09 RX ADMIN — TOPIRAMATE 50 MG: 50 TABLET ORAL at 09:31

## 2022-11-09 RX ADMIN — METHOCARBAMOL 500 MG: 500 TABLET ORAL at 16:44

## 2022-11-09 RX ADMIN — OXYCODONE HYDROCHLORIDE 5 MG: 5 TABLET ORAL at 13:41

## 2022-11-09 RX ADMIN — TACROLIMUS 6 MG: 5 CAPSULE ORAL at 09:31

## 2022-11-09 RX ADMIN — PANCRELIPASE 2 CAPSULE: 24000; 76000; 120000 CAPSULE, DELAYED RELEASE PELLETS ORAL at 18:43

## 2022-11-09 RX ADMIN — POTASSIUM PHOSPHATE, MONOBASIC 500 MG: 500 TABLET, SOLUBLE ORAL at 21:25

## 2022-11-09 RX ADMIN — GABAPENTIN 200 MG: 100 CAPSULE ORAL at 19:50

## 2022-11-09 RX ADMIN — MAGNESIUM OXIDE TAB 400 MG (241.3 MG ELEMENTAL MG) 400 MG: 400 (241.3 MG) TAB at 19:49

## 2022-11-09 RX ADMIN — Medication 500 MG: at 19:50

## 2022-11-09 RX ADMIN — ASPIRIN 81 MG CHEWABLE TABLET 81 MG: 81 TABLET CHEWABLE at 09:29

## 2022-11-09 RX ADMIN — Medication 1 CAPSULE: at 13:41

## 2022-11-09 RX ADMIN — Medication: at 21:32

## 2022-11-09 RX ADMIN — ONDANSETRON 4 MG: 4 TABLET, ORALLY DISINTEGRATING ORAL at 21:32

## 2022-11-09 RX ADMIN — RIZATRIPTAN BENZOATE 10 MG: 10 TABLET ORAL at 01:14

## 2022-11-09 RX ADMIN — PANTOPRAZOLE SODIUM 40 MG: 40 TABLET, DELAYED RELEASE ORAL at 09:29

## 2022-11-09 RX ADMIN — OXYCODONE HYDROCHLORIDE 5 MG: 5 TABLET ORAL at 06:22

## 2022-11-09 RX ADMIN — ESCITALOPRAM OXALATE 10 MG: 10 TABLET ORAL at 09:30

## 2022-11-09 RX ADMIN — TRAZODONE HYDROCHLORIDE 50 MG: 50 TABLET ORAL at 22:29

## 2022-11-09 RX ADMIN — POLYETHYLENE GLYCOL 3350 17 G: 17 POWDER, FOR SOLUTION ORAL at 09:32

## 2022-11-09 RX ADMIN — PANCRELIPASE 2 CAPSULE: 24000; 76000; 120000 CAPSULE, DELAYED RELEASE PELLETS ORAL at 09:51

## 2022-11-09 RX ADMIN — GABAPENTIN 200 MG: 100 CAPSULE ORAL at 09:29

## 2022-11-09 RX ADMIN — NYSTATIN 500000 UNITS: 100000 SUSPENSION ORAL at 19:49

## 2022-11-09 ASSESSMENT — ACTIVITIES OF DAILY LIVING (ADL)
ADLS_ACUITY_SCORE: 32

## 2022-11-09 NOTE — PLAN OF CARE
"Vitals: /73 (BP Location: Right arm)   Pulse 86   Temp 98.2  F (36.8  C) (Oral)   Resp 16   Ht 1.676 m (5' 6\")   Wt 90 kg (198 lb 6.4 oz)   SpO2 98%   BMI 32.02 kg/m      Endocrine: Blood glucose 176-289, Lantus, Novolog scale and carb coverage.  Labs: Stable labs, Phosphorus 1.8, team notified.   Pain: Back and abdominal pain.  PRN's: Oxycodone 5 mg.  Diet: Regular diet, on calorie counts.  LDA: PIV saline locked.  GI: BM 11/8, took Miralax and Senna.  : Void of 600cc, dark deangelo.  Skin: Jaundiced, abdominal incision with staples.  Neuro: Oriented, anxious at times.  Mobility: Stand by assist in the halls, independent getting out of bed and ADL's.  Education: Medication card and lab book updated.  Plan: Discharge home when medically stable in 1-2 days.                        "

## 2022-11-09 NOTE — PROGRESS NOTES
CLINICAL NUTRITION SERVICES - REASSESSMENT NOTE     Nutrition Prescription    Malnutrition Status:    Moderate to Severe malnutrition in the context of acute on chronic illness    Recommendations already ordered by Registered Dietitian (RD):  Calorie counts     Future/Additional Recommendations:  -- Monitor for adequate PO intake   --Continue ONS as ordered      EVALUATION OF THE PROGRESS TOWARD GOALS   Diet: Regular with magic cup BID    Intake:  25% to 100% documented per chart review   (11/7): team to ordered SAMI. Not hungry she feels bloated   (11/8): eating well per team just not recorded    Patient reports a fair appetite (not great but improving).      Juan counts: Total Kcals: 486     Total Protein: 26g  11/5       Total Kcals: 329       Total Protein: 8g - # Meals Recorded: 3  11/6       Total Kcals: 330       Total Protein: 9g - # Meals Recorded: 1   11/7       Total Kcals: 0           Total Protein: 0g - per nursing documentation 25% of 1 meal (11/7). Per chart review drinking juice and soda  11/8       Total Kcals: 1286     Total Protein: 38g - # Meals Recorded: 3; # Supplements Recorded: 100% 2 Ensure Clears      NEW FINDINGS   Per  Pt, has not received formal nutrition education on post-Tx guidelines in the recent past.     Labs (11/9):   - Phos 1.8 mg/dL (L), Na 130 mmol/L (L), K+ 3.7 mmol/L (WNL), Mg++ 2.1 mg/dL (WNL)  - AST 66 U/L (H),  U/L (H)  - BGM: 164 - 189 (H)     Meds:   Creon 24 2 capsules with meals  Novolog  Lantus PEN   Miralax    Weight History:  Weight appears up 10.2 kg over the past week. Likely wt fluctuations with fluid status.  Vitals:    11/03/22 0400 11/05/22 0800 11/06/22 0746 11/08/22 0106   Weight: 79.8 kg (175 lb 14.8 oz) 95.5 kg (210 lb 9.6 oz) 92.6 kg (204 lb 1.6 oz) 90.2 kg (198 lb 14.4 oz)    11/09/22 1016   Weight: 90 kg (198 lb 6.4 oz)     Dosing Weight: 63 kg (adj wt based on IBW of 59.1 kg and actual wt of 76.2 kg)     ASSESSED NUTRITION NEEDS  Estimated Energy  Needs: 9943-5707 kcals/day (30 - 35 kcals/kg )  Justification: Increased needs  Estimated Protein Needs:  grams protein/day (1.5 - 2 grams of pro/kg)  Justification: Increased needs    MALNUTRITION  % Intake: < 75% for > 7 days (moderate)  % Weight Loss: None noted. However, true weight changes confounded by fluid shifts.  Subcutaneous Fat Loss: Unable to assess  Muscle Loss: Unable to assess  Fluid Accumulation/Edema: 2+ Mild, Dependent and Generalized   Malnutrition Diagnosis: Moderate to Severe malnutrition in the context of acute on chronic illness    Previous Goals   Diet adv v nutrition support within 2-3 days.  Evaluation: Met    Previous Nutrition Diagnosis  Inadequate oral intake related to AMS, nausea/vomiting, reduced appetite as evidenced by patient consuming <50% of usual intake for >1 month.  Evaluation: Improving    CURRENT NUTRITION DIAGNOSIS  Inadequate oral intake related to slow diet advancement, Post-op Liver Txp as evidenced by calorie counts and  intake </= 75% over the past week consistently documented.     INTERVENTIONS  Implementation  Nutrition education for nutrition relationship to health/disease: Provided education on, high protein/kcal diet x 8 weeks, heart healthy diet and food safety precautions.    - Encouraged pt to continue intake as tolerated w/ BID to TID meals/day   - Encouraged small frequent meals w/ protein dense food choices    Provided verbal and written nutrition education on post-Tx nutrition guidelines. Nutrition education included need for increased protein and kcal needs post-op and food safety.  - Handout given: Post-Tx Nutrition Guidelines, [employer] Food Safety Booklet, and Sources of Protein      Medical food supplement therapy: Discussed and encouraged continued oral supplements/need for increased protein/kcal intake.      Pt w/ no further questions and seemed agreeable to nutrition plan.     Goals  Total avg nutritional intake to meet a minimum of 30 kcal/kg and  1.5 g PRO/kg daily (per dosing wt 63 kg).    Monitoring/Evaluation  Progress toward goals will be monitored and evaluated per protocol.    Chung Patel  Dietetic Intern       I have read the above note by the dietetic intern and agree with the reassessment.  Marie Espinoza, MS/RD/LD  7A/Obs RD pager: [phone]  Weekend/Holiday RD pager: [phone]

## 2022-11-09 NOTE — PROGRESS NOTES
Care Management Follow Up    Length of Stay (days): 6    Expected Discharge Date: 11/10/2022     Concerns to be Addressed:       Patient plan of care discussed at interdisciplinary rounds: Yes    Anticipated Discharge Disposition: Home vs TCU     Anticipated Discharge Services: Home RN, PT, OT vs TCU  Anticipated Discharge DME: TBD    Patient/family educated on Medicare website which has current facility and service quality ratings:  Yes  Education Provided on the Discharge Plan:  Yes  Patient/Family in Agreement with the Plan:  Yes    Referrals Placed by CM/SW:  TCU Homecare  Additional Information: Pt status reviewed/disscused with [facility] transplant and helena Grant SW. Current recommendation is TCU, however, pt may progress to home with homecare plan. [facility] reviewing writer initiated HC referrals. Will continue to monitor.      Maribel Hodges RN        Home Care Referrals pending:    [facility]  (ph:[phone] fx:[phone]) VM left requesting return call  [facility] (ph:[phone] fx:[phone]) Spoke with Jacquie team will review, clinical faxed  [facility] (ph:[phone] fx:[phone]) Spoke with Raven, team will review, clinical faxed  [facility] (ph:[phone]   fx:[phone]) - Spoke with ROBERTO Sequeira left for Delbert, Manager, clinical faxed.   [facility] (ph:[phone] fx:[phone])  Clinical faxed for review  [facility] [email or web address] (Ph: [phone] fx:[phone])- VM left requesting return call  [facility] (ph:[phone] fx:[phone]) VM left requesting return call  [facility]  (ph:[phone] fx:[phone]) - VM left requesting return call     Home Care Agencies Declined  [facility] -don't take insurance  [facility] -don't take insurance  [facility]   -don't take insurance  [facility] -don't take insurance  [facility] -don't take insurance    [facility]   -don't take insurance  [facility] -don't take insurance  [facility] -don't take insurance  [facility] -don't take insurance  [facility]  -don't take insurance  [facility]  Unable to accept d/t transplant lab need  [facility] - unable to accept d/t staffing [facility] - unable to accept d/t staffing  [facility] - unable to accept d/t insurance  [facility] unable to accept d/t staffing  [facility] - Unable to accept d/t capacity of pt care

## 2022-11-09 NOTE — PLAN OF CARE
"VS: /67 (BP Location: Right arm)   Pulse 57   Temp 98.4  F (36.9  C) (Oral)   Resp 16   Ht 1.676 m (5' 6\")   Wt 90.2 kg (198 lb 14.4 oz)   SpO2 97%   BMI 32.10 kg/m      Cares: 7711-7503    Current condition: VSS on RA  Neuro: A&Ox4  Cardio: WNL  Respiratory: Lung sounds diminished at bases    GI/: Voiding, 1 loose BM on shift  Skin: Clamshell incision stapled and TRACEY, old TERESITA site leaking - dressing changed  Diet: Regular CC    Labs: Awaiting AM labs  BG: ACHS (164)  LDA: PIV SL    Mobility: SBA    Pain: Pain at incision site  PRN medications: Oxy x2, rizatriptan x1, benadryl cream  Plan of Care: Continue to monitor      "

## 2022-11-09 NOTE — PROGRESS NOTES
Calorie Count  Intake recorded for: 11/8  Total Kcals: 1286 Total Protein: 38g  Kcals from Hospital Food: 1286  Protein: 38g  Kcals from Outside Food (average):0 Protein: 0g  # Meals Ordered from Kitchen: 3 meals   # Meals Recorded: 3 meals (First - 100% oatmeal w/ brown sugar, 50% milk, hard boiled egg)       (Second - 100% sherbet, 50% grilled cheese sandwich)       (Third - 100% 2 sherbets, less than 25% tortilla w/ turkey, cheese & lettuce)   # Supplements Recorded: 100% 2 Ensure Clears

## 2022-11-09 NOTE — PLAN OF CARE
"VS: /69 (BP Location: Right arm)   Pulse 69   Temp 98.7  F (37.1  C) (Oral)   Resp 18   Ht 1.676 m (5' 6\")   Wt 90.2 kg (198 lb 14.4 oz)   SpO2 97%   BMI 32.10 kg/m        Neuro: alert and oriented X4 can be anxious   Cardio: WNL   Respiratory: RA   GI/: continent of bowel had X1 large one after suppository was given   Skin: has clam shell incision intact with staples. Old TERESITA site with stitch was leaking and primopore was placed   Diet:  Juan count carb coverage regular was able to tolerate    Labs: pending AM   BG: AC HS 1700-242, HS- 179  LDA: PIV SL    Mobility:  Assist 1 with bedside commode   Pain:  PRN medications: atarax X2 and oxycodone X2   Changes: none   Plan of Care:  Was up in the recliner for most of the shift. Need reassurance d/t anxiety.  was supportive. For pain is using aqua K pad which is effective. Wants a shower in the AM.                         "

## 2022-11-09 NOTE — PROGRESS NOTES
"Transplant Social Work Services Progress Note    Date of Initial Social Work Evaluation: 10/19/2022  Collaborated with:  TCU    Data: Susan underwent a  donor liver transplant on 2022  Intervention: [facility]: I spoke with [facility] admissions. They will submit prior auth this morning. TBD whether patient will receive authorization for a short TCU stay.   Patient and spouse (via phone) Family friend and post transplant patient was also in the room for part of the discussion: I spoke with patient at bedside regarding TCU and prior auth. Home care with limited availability due to staffing and insurance coverage. Patient feels comfortable returning home. We talked about additional folks that can come stay with them and what to ask for during their stay. I called Roly at bedside with spouse present. Roly voiced concern with patient coming home prior to being pain free, no leg swelling and completely mobile. I attempted to provide education regarding that Susan's pain will be managed, however not gone, leg swelling could take days/weeks to go down and that she is not going to back at baseline when she returns home. Roly reported that they were told during orientation that she would be in the hospital for 4 weeks. I attempted to provide education that we typically say 1-3 weeks, with the average length of stay of 12 days. Roly was not open to heard information. He voiced concern with the medical team and indicated that they will be reporting it to patient relations. I encouraged them to do so if they have concerns. Throughout education, Roly is not comfortable with her coming home until all of the medical piece is \"fixed.\" I indicated that Vinita and BRENDON will continue to work on TCU and home care, knowing that there might be limited/no availability for home care and denied from insurance for TCU.     Assessment:   Education provided by SW: TCU/Home care/discharge planning/length of stay/symptom management   Plan:    " Discharge Plans in Progress: TCU vs home    Barriers to d/c plan: Per team, patient is medically ready to discharge     Follow up Plan: This writer will continue to follow during patient's admission.     RENARD Alvarado, [facility]  Liver Transplant   [facility]  Phone: [phone]  Pager: [phone]

## 2022-11-09 NOTE — PROGRESS NOTES
Transplant Surgery  Inpatient Daily Progress Note  11/09/2022    Assessment & Plan: Susan Puckett is a 52 year old female with PMH significant obesity s/p Faustino-en-Y, DM, HTN, CUETO cirrhosis c/b HE, EV, Hepatic hydrothorax. She is now s/p OLTx 11/2 with Dr. Delbert Morgan.    Graft function:  Liver transplant: POD # 7. Liver transaminases trended down, plateaued.  -JPs: removed lateral drain on 11/4, removed last drain on 11/5.   Graft patency ppx: ASA 81 daily  Liver US: POD #0, Patent doppler, Elevated RI=1 in HA  Repeat US POD #1, unchanged elevated RI in HA  Repeat US POD #2, continued elevated RIs, slightly improved flow in the left hepatic artery   Immunosuppression management:  Steroid taper induction  Maintenance:  Tac 6mg BID (goal 8-12) level 8.3 today.   MMF 750mg BID  Complexity of management:High. Contributing factors:  induction , Flucon x2, now completed.   Hematology:   Acute blood loss/Anemia of chronic disease: Hgb stable, 9-10  Thrombocytopenia: due to liver disease, improving, Plts 92  Coagulopathy: due to liver disease, INR 1.3 on 11/4. Received 1U cryo overnight on 11/3.  Neurology:  Acute postoperative pain:controlled. Continue Oxycodone 5mg Q4, Robaxin 500mg QID, Atarax PRN. add lidocaine patches.  Right thigh pain:  US 11/6 no DVT.  Hx of hypoglycemic seizures: continue topamax  Anxiety/Depression: Continue lexapro, Trazadone, atarax PRN. Agrees to health psych consult  Cardiorespiratory:   Hypoxia: Resolved, on room air. Encourage CDB/IS  GI/Nutrition: At risk for malnutrition:  Reg diet +supplements as tolerated, minimal intake. RD following.   Hx of Gastric bypass:  SAMI mvi  Pancreatic insufficiency: on Creon PTA, restart.   Constipation: Reporting abdominal bloating. Continue bowel regimen senna and miralax. Dulcolax suppos PRN.  Endocrine:   Steroid induced hyperglycemia/DM: on Januvia and insulin PTA, on insulin gtt initially post operatively. Discontinued insulin gtt and transitioned to  ssi prn, continue Lantus 10 units daily, increase to 12. continue Carb coverage. Monitor closely with steroid tapered off   Hypothyroid: continue synthroid   Fluid/Electrolytes:   Hypervolemia associated with renal insufficiency: Weight Up 11kg. Stable off diuretics.   Hypoalbuminemia: Albumin 2.7    GEORGETTE: Improving, SCr peaked at 1.75, at  Baseline 0.9-1.   Hypomagnesemia: Mag ox 400 mg BID. Supplement PRN  Hypokalemia: improved after supplement, K 3.7   Hyponatremia: Na 130  Hypercalcemia: start oscal BID  Hypophosphatemia: Phos 1.8, cont kphos BID   : Rossi removed, continue strict I&Os   Infectious disease: Afebrile, WBC normal  Covid + 11/1: Cycle threshold =39.7, covid recovered     Prophylaxis: DVT(SCDs, GI (PPI), fungal (diflucan x1, nystatin), PCP ppx (bactrim), CMV ppx (valcyte)   Periop ppx zosyn x48hrs,   Disposition: 7A, PT/OT. Will eval TCU vs home.  Trying to find support for home as spouse recently had his own surgery and is unable to offer complete support.    Medical Decision Making: Medium  Subsequent visit 23697 (moderate level decision making)    ANYA/Fellow/Resident Provider: Ange Sánchez NP        Faculty: Mil Tejada M.D.      __________________________________________________________________  Transplant History: Admitted 11/1/2022 for Liver transplant .    11/2/2022 (Liver), Postoperative day: 7     Interval History: History is obtained from the patient    Overnight events: Pain adequate today. reporting ambulate in halls x2 . BM after suppository.  RADHA -bilaterally but denies impacting ambulation. Leaking from old TERESITA site.     ROS:   A 10-point review of systems was negative except as noted above.    Curent Meds:   amylase-lipase-protease  2 capsule Oral TID w/meals    aspirin  81 mg Oral Daily    calcium carbonate  500 mg Oral BID    escitalopram  10 mg Oral or Feeding Tube Daily    gabapentin  200 mg Oral BID    insulin aspart   Subcutaneous TID w/meals    insulin aspart  1-10  "Units Subcutaneous TID AC    insulin aspart  1-7 Units Subcutaneous At Bedtime    insulin glargine  12 Units Subcutaneous QAM AC    levothyroxine  150 mcg Oral or Feeding Tube QAM AC    lidocaine  2-3 patch Transdermal Q24H    lidocaine (PF)  5 mL Subcutaneous Once    lidocaine   Transdermal Q8H JULIEN    magnesium oxide  400 mg Oral BID    methocarbamol  500 mg Oral 4x Daily    multivitamin CF FORMULA  1 capsule Oral Daily    mycophenolate  750 mg Oral BID IS    nystatin  500,000 Units Oral 4x Daily    pantoprazole  40 mg Oral Daily    polyethylene glycol  17 g Oral BID    potassium phosphate (monobasic)  500 mg Oral BID    sennosides  2 tablet Oral or Feeding Tube BID    sodium chloride (PF)  3 mL Intravenous Q8H    sulfamethoxazole-trimethoprim  1 tablet Oral or Feeding Tube Daily    tacrolimus  6 mg Oral BID IS    topiramate  50 mg Oral or Feeding Tube Daily    traZODone  50 mg Oral or Feeding Tube At Bedtime    valGANciclovir  450 mg Oral Daily       Physical Exam:     Admit Weight: 79.9 kg (176 lb 1.6 oz)    Current Vitals:   /73 (BP Location: Right arm)   Pulse 86   Temp 98.2  F (36.8  C) (Oral)   Resp 16   Ht 1.676 m (5' 6\")   Wt 90 kg (198 lb 6.4 oz)   SpO2 98%   BMI 32.02 kg/m      Vital sign ranges:    Temp:  [97.9  F (36.6  C)-98.7  F (37.1  C)] 98.2  F (36.8  C)  Pulse:  [57-86] 86  Resp:  [16-18] 16  BP: (100-123)/(67-74) 103/73  SpO2:  [97 %-100 %] 98 %  Patient Vitals for the past 24 hrs:   BP Temp Temp src Pulse Resp SpO2 Weight   11/09/22 1027 103/73 98.2  F (36.8  C) Oral 86 16 98 % --   11/09/22 1016 -- -- -- -- -- -- 90 kg (198 lb 6.4 oz)   11/09/22 0614 100/67 98.4  F (36.9  C) Oral 57 16 97 % --   11/09/22 0141 123/74 97.9  F (36.6  C) Oral 59 16 97 % --   11/08/22 2207 105/69 98.7  F (37.1  C) Oral 69 18 97 % --   11/08/22 1300 108/74 98.4  F (36.9  C) Oral 72 18 100 % --     General Appearance/psych: in NAD, tearful at times.  Skin: normal, warm, No rashes, induration, or jaundice, " see abd.  Heart: regular rate and rhythm,   Lungs: NLB on room air  Abdomen: slightly distended, rounded, appropriately tender to palpation, incision with staples CDI. Old vishal site leaking serous drainage  : Voiding  Extremities: edema: present bilateral. 2+.   Neurologic: A&Ox3. CNS intact to RLE.  Neha independently. Tremor absent. Asterixis: absent.    Frailty Scores       Frailty Scores 1/6/2022 10/18/2020    Final Score Frail Not Frail    Final Score Number 3 2            Data:   CMP  Recent Labs   Lab 11/09/22  0935 11/09/22  0647 11/08/22  0851 11/08/22  0747 11/03/22  0452 11/03/22  0358 11/02/22  1625 11/02/22  1434   NA  --  130*  --  132*   < > 143   < > 142   POTASSIUM  --  3.7  --  3.3*   < > 3.9   < > 2.9*   CHLORIDE  --  97*  --  97*   < > 107   < >  --    CO2  --  22  --  24   < > 24   < >  --    * 189*   < > 220*   < > 149*   < > 263*   BUN  --  14.4  --  16.8   < > 17.1   < >  --    CR  --  0.95  --  1.09*   < > 0.87   < >  --    GFRESTIMATED  --  73  --  62   < > 81   < >  --    MAURI  --  7.1*  --  6.7*   < > 8.8   < >  --    ICAW  --   --   --   --   --  4.7  --  5.3*   MAG  --  2.1  --  1.6*   < > 2.0  --   --    PHOS  --  1.8*  --  1.6*   < > 4.6*  --   --    AMYLASE  --   --   --   --   --  65  --   --    LIPASE  --   --   --   --   --  6*  --   --    ALBUMIN  --  2.7*  --  3.0*   < > 3.2*   < >  --    BILITOTAL  --  0.9  --  0.9   < > 1.2   < >  --    ALKPHOS  --  224*  --  176*   < > 48   < >  --    AST  --  66*  --  63*   < > 479*   < >  --    ALT  --  134*  --  135*   < > 371*   < >  --     < > = values in this interval not displayed.     CBC  Recent Labs   Lab 11/09/22  0647 11/08/22  0747 11/02/22  1625 11/02/22  1450   HGB 9.7* 9.5*   < > 9.8*   WBC 3.6* 4.1   < >  --    PLT 92* 70*   < > 96*   A1C  --   --   --  5.7*    < > = values in this interval not displayed.     COAGS  Recent Labs   Lab 11/04/22  0607 11/03/22  0850 11/02/22  1815 11/02/22  1625 11/02/22  1450   INR 1.32*  1.33*   < > 1.94* 2.79*   PTT  --   --   --  40* 65*    < > = values in this interval not displayed.      Urinalysis  Recent Labs   Lab Test 11/05/22  0928 11/01/22 2133   COLOR Light Yellow Yellow   APPEARANCE Clear Clear   URINEGLC Negative Negative   URINEBILI Negative Negative   URINEKETONE 10* Negative   SG 1.026 1.015   UBLD Negative Negative   URINEPH 5.5 7.0   PROTEIN 20* Negative   NITRITE Negative Negative   LEUKEST Negative Negative   RBCU 1 1   WBCU 3 1     Virology:  Hepatitis C Antibody   Date Value Ref Range Status   11/01/2022 Nonreactive Nonreactive Final     Attestation:    The patient has been seen with team and evaluated by me.   Vital signs, labs, medications and orders were reviewed.   When obtained, diagnostic images were reviewed by me and interpreted as above.    The care plan was discussed with the multidisciplinary team and I agree with the findings and plan in this note, with any differences recorded in blue.    Immunosuppressive medication management was reviewed and adjusted as reflected in the note and orders.     .

## 2022-11-10 ENCOUNTER — APPOINTMENT (OUTPATIENT)
Dept: PHYSICAL THERAPY | Facility: CLINIC | Age: 52
DRG: 005 | End: 2022-11-10
Attending: TRANSPLANT SURGERY
Payer: COMMERCIAL

## 2022-11-10 ENCOUNTER — APPOINTMENT (OUTPATIENT)
Dept: OCCUPATIONAL THERAPY | Facility: CLINIC | Age: 52
DRG: 005 | End: 2022-11-10
Attending: TRANSPLANT SURGERY
Payer: COMMERCIAL

## 2022-11-10 ENCOUNTER — HOSPITAL ENCOUNTER (INPATIENT)
Facility: SKILLED NURSING FACILITY | Age: 52
LOS: 7 days | Discharge: HOME | DRG: 949 | End: 2022-11-17
Attending: INTERNAL MEDICINE | Admitting: INTERNAL MEDICINE
Payer: COMMERCIAL

## 2022-11-10 VITALS
HEART RATE: 68 BPM | HEIGHT: 66 IN | SYSTOLIC BLOOD PRESSURE: 107 MMHG | TEMPERATURE: 97.8 F | WEIGHT: 197.31 LBS | BODY MASS INDEX: 31.71 KG/M2 | DIASTOLIC BLOOD PRESSURE: 71 MMHG | OXYGEN SATURATION: 99 % | RESPIRATION RATE: 16 BRPM

## 2022-11-10 DIAGNOSIS — E11.9 TYPE 2 DIABETES MELLITUS WITHOUT COMPLICATION, WITHOUT LONG-TERM CURRENT USE OF INSULIN (H): ICD-10-CM

## 2022-11-10 DIAGNOSIS — N17.0 ACUTE KIDNEY FAILURE WITH TUBULAR NECROSIS (H): ICD-10-CM

## 2022-11-10 DIAGNOSIS — K74.60 CIRRHOSIS OF LIVER WITHOUT ASCITES, UNSPECIFIED HEPATIC CIRRHOSIS TYPE (H): ICD-10-CM

## 2022-11-10 DIAGNOSIS — Z94.4 LIVER REPLACED BY TRANSPLANT (H): ICD-10-CM

## 2022-11-10 DIAGNOSIS — M79.2 NEUROPATHIC PAIN: ICD-10-CM

## 2022-11-10 DIAGNOSIS — Z87.11 HISTORY OF GASTRIC ULCER: Primary | ICD-10-CM

## 2022-11-10 DIAGNOSIS — R52 PAIN: ICD-10-CM

## 2022-11-10 DIAGNOSIS — R14.0 ABDOMINAL BLOATING: ICD-10-CM

## 2022-11-10 DIAGNOSIS — R53.81 PHYSICAL DECONDITIONING: ICD-10-CM

## 2022-11-10 PROBLEM — G89.18 ACUTE POST-OPERATIVE PAIN: Status: ACTIVE | Noted: 2022-11-10

## 2022-11-10 PROBLEM — G43.909 MIGRAINE: Status: ACTIVE | Noted: 2022-11-10

## 2022-11-10 PROBLEM — E83.39 HYPOPHOSPHATEMIA: Status: ACTIVE | Noted: 2022-11-10

## 2022-11-10 PROBLEM — E16.2 SEIZURE DUE TO HYPOGLYCEMIA (H): Status: ACTIVE | Noted: 2022-11-10

## 2022-11-10 PROBLEM — K86.89 PANCREATIC INSUFFICIENCY: Status: ACTIVE | Noted: 2022-11-10

## 2022-11-10 PROBLEM — R56.9 SEIZURE DUE TO HYPOGLYCEMIA (H): Status: ACTIVE | Noted: 2022-11-10

## 2022-11-10 PROBLEM — E44.0 MODERATE MALNUTRITION (H): Status: ACTIVE | Noted: 2022-11-10

## 2022-11-10 PROBLEM — R53.1 GENERALIZED WEAKNESS: Status: ACTIVE | Noted: 2022-11-10

## 2022-11-10 LAB
ALBUMIN SERPL BCG-MCNC: 2.9 G/DL (ref 3.5–5.2)
ALP SERPL-CCNC: 256 U/L (ref 35–104)
ALT SERPL W P-5'-P-CCNC: 108 U/L (ref 10–35)
ANION GAP SERPL CALCULATED.3IONS-SCNC: 8 MMOL/L (ref 7–15)
AST SERPL W P-5'-P-CCNC: 39 U/L (ref 10–35)
BASOPHILS # BLD AUTO: 0 10E3/UL (ref 0–0.2)
BASOPHILS NFR BLD AUTO: 0 %
BILIRUB DIRECT SERPL-MCNC: 0.3 MG/DL (ref 0–0.3)
BILIRUB SERPL-MCNC: 0.7 MG/DL
BUN SERPL-MCNC: 16.6 MG/DL (ref 6–20)
CALCIUM SERPL-MCNC: 7.6 MG/DL (ref 8.6–10)
CHLORIDE SERPL-SCNC: 100 MMOL/L (ref 98–107)
CREAT SERPL-MCNC: 0.99 MG/DL (ref 0.51–0.95)
DEPRECATED HCO3 PLAS-SCNC: 25 MMOL/L (ref 22–29)
EOSINOPHIL # BLD AUTO: 0.3 10E3/UL (ref 0–0.7)
EOSINOPHIL NFR BLD AUTO: 6 %
ERYTHROCYTE [DISTWIDTH] IN BLOOD BY AUTOMATED COUNT: 15.7 % (ref 10–15)
GFR SERPL CREATININE-BSD FRML MDRD: 69 ML/MIN/1.73M2
GLUCOSE BLDC GLUCOMTR-MCNC: 146 MG/DL (ref 70–99)
GLUCOSE BLDC GLUCOMTR-MCNC: 150 MG/DL (ref 70–99)
GLUCOSE BLDC GLUCOMTR-MCNC: 158 MG/DL (ref 70–99)
GLUCOSE BLDC GLUCOMTR-MCNC: 167 MG/DL (ref 70–99)
GLUCOSE BLDC GLUCOMTR-MCNC: 321 MG/DL (ref 70–99)
GLUCOSE SERPL-MCNC: 177 MG/DL (ref 70–99)
HCT VFR BLD AUTO: 31.7 % (ref 35–47)
HGB BLD-MCNC: 10 G/DL (ref 11.7–15.7)
IMM GRANULOCYTES # BLD: 0 10E3/UL
IMM GRANULOCYTES NFR BLD: 1 %
LYMPHOCYTES # BLD AUTO: 0.7 10E3/UL (ref 0.8–5.3)
LYMPHOCYTES NFR BLD AUTO: 16 %
MAGNESIUM SERPL-MCNC: 2.2 MG/DL (ref 1.7–2.3)
MCH RBC QN AUTO: 30.8 PG (ref 26.5–33)
MCHC RBC AUTO-ENTMCNC: 31.5 G/DL (ref 31.5–36.5)
MCV RBC AUTO: 98 FL (ref 78–100)
MONOCYTES # BLD AUTO: 0.3 10E3/UL (ref 0–1.3)
MONOCYTES NFR BLD AUTO: 5 %
NEUTROPHILS # BLD AUTO: 3.4 10E3/UL (ref 1.6–8.3)
NEUTROPHILS NFR BLD AUTO: 72 %
NRBC # BLD AUTO: 0 10E3/UL
NRBC BLD AUTO-RTO: 0 /100
PHOSPHATE SERPL-MCNC: 2 MG/DL (ref 2.5–4.5)
PLATELET # BLD AUTO: 136 10E3/UL (ref 150–450)
POTASSIUM SERPL-SCNC: 4.3 MMOL/L (ref 3.4–5.3)
PROT SERPL-MCNC: 5.3 G/DL (ref 6.4–8.3)
RBC # BLD AUTO: 3.25 10E6/UL (ref 3.8–5.2)
SODIUM SERPL-SCNC: 133 MMOL/L (ref 136–145)
TACROLIMUS BLD-MCNC: 10.5 UG/L (ref 5–15)
TME LAST DOSE: NORMAL H
TME LAST DOSE: NORMAL H
WBC # BLD AUTO: 4.7 10E3/UL (ref 4–11)

## 2022-11-10 PROCEDURE — 250N000013 HC RX MED GY IP 250 OP 250 PS 637: Performed by: TRANSPLANT SURGERY

## 2022-11-10 PROCEDURE — 82310 ASSAY OF CALCIUM: CPT | Performed by: NURSE PRACTITIONER

## 2022-11-10 PROCEDURE — 85025 COMPLETE CBC W/AUTO DIFF WBC: CPT | Performed by: NURSE PRACTITIONER

## 2022-11-10 PROCEDURE — 97116 GAIT TRAINING THERAPY: CPT | Mod: GP | Performed by: REHABILITATION PRACTITIONER

## 2022-11-10 PROCEDURE — 250N000012 HC RX MED GY IP 250 OP 636 PS 637: Performed by: NURSE PRACTITIONER

## 2022-11-10 PROCEDURE — 82374 ASSAY BLOOD CARBON DIOXIDE: CPT | Performed by: NURSE PRACTITIONER

## 2022-11-10 PROCEDURE — 250N000013 HC RX MED GY IP 250 OP 250 PS 637: Performed by: NURSE PRACTITIONER

## 2022-11-10 PROCEDURE — 250N000012 HC RX MED GY IP 250 OP 636 PS 637: Performed by: INTERNAL MEDICINE

## 2022-11-10 PROCEDURE — 250N000012 HC RX MED GY IP 250 OP 636 PS 637: Performed by: PHYSICIAN ASSISTANT

## 2022-11-10 PROCEDURE — 250N000013 HC RX MED GY IP 250 OP 250 PS 637: Performed by: PHYSICIAN ASSISTANT

## 2022-11-10 PROCEDURE — 36415 COLL VENOUS BLD VENIPUNCTURE: CPT | Performed by: NURSE PRACTITIONER

## 2022-11-10 PROCEDURE — 120N000009 HC R&B SNF

## 2022-11-10 PROCEDURE — 84100 ASSAY OF PHOSPHORUS: CPT | Performed by: NURSE PRACTITIONER

## 2022-11-10 PROCEDURE — 83735 ASSAY OF MAGNESIUM: CPT | Performed by: TRANSPLANT SURGERY

## 2022-11-10 PROCEDURE — 97530 THERAPEUTIC ACTIVITIES: CPT | Mod: GP | Performed by: REHABILITATION PRACTITIONER

## 2022-11-10 PROCEDURE — 80197 ASSAY OF TACROLIMUS: CPT | Performed by: NURSE PRACTITIONER

## 2022-11-10 PROCEDURE — 82248 BILIRUBIN DIRECT: CPT | Performed by: NURSE PRACTITIONER

## 2022-11-10 PROCEDURE — 97535 SELF CARE MNGMENT TRAINING: CPT | Mod: GO

## 2022-11-10 PROCEDURE — 250N000013 HC RX MED GY IP 250 OP 250 PS 637: Performed by: SURGERY

## 2022-11-10 PROCEDURE — 250N000013 HC RX MED GY IP 250 OP 250 PS 637: Performed by: INTERNAL MEDICINE

## 2022-11-10 RX ORDER — MAGNESIUM OXIDE 400 MG/1
400 TABLET ORAL DAILY
Status: DISCONTINUED | OUTPATIENT
Start: 2022-11-10 | End: 2022-11-15

## 2022-11-10 RX ORDER — GABAPENTIN 100 MG/1
200 CAPSULE ORAL DAILY
Status: ON HOLD | DISCHARGE
Start: 2022-11-10 | End: 2022-11-16

## 2022-11-10 RX ORDER — LEVOTHYROXINE SODIUM 75 UG/1
150 TABLET ORAL
Status: DISCONTINUED | OUTPATIENT
Start: 2022-11-11 | End: 2022-11-17 | Stop reason: HOSPADM

## 2022-11-10 RX ORDER — TOPIRAMATE 50 MG/1
50 TABLET, FILM COATED ORAL DAILY
Status: DISCONTINUED | OUTPATIENT
Start: 2022-11-11 | End: 2022-11-17 | Stop reason: HOSPADM

## 2022-11-10 RX ORDER — IODINE/SODIUM IODIDE 2 %
TINCTURE TOPICAL
Status: DISCONTINUED | OUTPATIENT
Start: 2022-11-10 | End: 2022-11-17 | Stop reason: HOSPADM

## 2022-11-10 RX ORDER — OXYCODONE HYDROCHLORIDE 5 MG/1
5 TABLET ORAL EVERY 6 HOURS PRN
Refills: 0 | Status: ON HOLD | DISCHARGE
Start: 2022-11-10 | End: 2022-11-16

## 2022-11-10 RX ORDER — RIZATRIPTAN BENZOATE 10 MG/1
10 TABLET ORAL
Status: COMPLETED | OUTPATIENT
Start: 2022-11-10 | End: 2022-11-11

## 2022-11-10 RX ORDER — NALOXONE HYDROCHLORIDE 0.4 MG/ML
0.2 INJECTION, SOLUTION INTRAMUSCULAR; INTRAVENOUS; SUBCUTANEOUS
Status: DISCONTINUED | OUTPATIENT
Start: 2022-11-10 | End: 2022-11-17 | Stop reason: HOSPADM

## 2022-11-10 RX ORDER — NICOTINE POLACRILEX 4 MG
15-30 LOZENGE BUCCAL
Status: DISCONTINUED | OUTPATIENT
Start: 2022-11-10 | End: 2022-11-17 | Stop reason: HOSPADM

## 2022-11-10 RX ORDER — HYDROXYZINE HYDROCHLORIDE 25 MG/1
50 TABLET, FILM COATED ORAL EVERY 6 HOURS PRN
Status: DISCONTINUED | OUTPATIENT
Start: 2022-11-10 | End: 2022-11-17 | Stop reason: HOSPADM

## 2022-11-10 RX ORDER — DOCUSATE SODIUM 100 MG/1
100 CAPSULE, LIQUID FILLED ORAL 2 TIMES DAILY
Status: DISCONTINUED | OUTPATIENT
Start: 2022-11-10 | End: 2022-11-11

## 2022-11-10 RX ORDER — TRAZODONE HYDROCHLORIDE 50 MG/1
50 TABLET, FILM COATED ORAL AT BEDTIME
Status: DISCONTINUED | OUTPATIENT
Start: 2022-11-10 | End: 2022-11-17 | Stop reason: HOSPADM

## 2022-11-10 RX ORDER — SULFAMETHOXAZOLE AND TRIMETHOPRIM 400; 80 MG/1; MG/1
1 TABLET ORAL DAILY
Status: DISCONTINUED | OUTPATIENT
Start: 2022-11-11 | End: 2022-11-17 | Stop reason: HOSPADM

## 2022-11-10 RX ORDER — ESCITALOPRAM OXALATE 10 MG/1
10 TABLET ORAL DAILY
Status: DISCONTINUED | OUTPATIENT
Start: 2022-11-11 | End: 2022-11-17 | Stop reason: HOSPADM

## 2022-11-10 RX ORDER — METHOCARBAMOL 500 MG/1
500 TABLET, FILM COATED ORAL 4 TIMES DAILY
Qty: 8 TABLET | Refills: 0 | Status: ON HOLD | DISCHARGE
Start: 2022-11-10 | End: 2022-11-16

## 2022-11-10 RX ORDER — NALOXONE HYDROCHLORIDE 0.4 MG/ML
0.4 INJECTION, SOLUTION INTRAMUSCULAR; INTRAVENOUS; SUBCUTANEOUS
Status: DISCONTINUED | OUTPATIENT
Start: 2022-11-10 | End: 2022-11-17 | Stop reason: HOSPADM

## 2022-11-10 RX ORDER — HYDROXYZINE HYDROCHLORIDE 25 MG/1
25-50 TABLET, FILM COATED ORAL EVERY 6 HOURS PRN
Status: ON HOLD | DISCHARGE
Start: 2022-11-10 | End: 2022-11-16

## 2022-11-10 RX ORDER — MYCOPHENOLATE MOFETIL 250 MG/1
750 CAPSULE ORAL 2 TIMES DAILY
Status: ON HOLD | DISCHARGE
Start: 2022-11-10 | End: 2022-11-16

## 2022-11-10 RX ORDER — LIDOCAINE 4 G/G
2 PATCH TOPICAL
Status: DISCONTINUED | OUTPATIENT
Start: 2022-11-11 | End: 2022-11-17 | Stop reason: HOSPADM

## 2022-11-10 RX ORDER — NYSTATIN 100000/ML
500000 SUSPENSION, ORAL (FINAL DOSE FORM) ORAL 4 TIMES DAILY
Status: DISCONTINUED | OUTPATIENT
Start: 2022-11-10 | End: 2022-11-10 | Stop reason: HOSPADM

## 2022-11-10 RX ORDER — TACROLIMUS 1 MG/1
6 CAPSULE ORAL 2 TIMES DAILY
Status: ON HOLD | DISCHARGE
Start: 2022-11-10 | End: 2022-11-16

## 2022-11-10 RX ORDER — BISACODYL 10 MG
10 SUPPOSITORY, RECTAL RECTAL 2 TIMES DAILY PRN
Status: DISCONTINUED | OUTPATIENT
Start: 2022-11-10 | End: 2022-11-17 | Stop reason: HOSPADM

## 2022-11-10 RX ORDER — MYCOPHENOLATE MOFETIL 250 MG/1
750 CAPSULE ORAL
Status: DISCONTINUED | OUTPATIENT
Start: 2022-11-10 | End: 2022-11-17 | Stop reason: HOSPADM

## 2022-11-10 RX ORDER — PEDI MULTIVIT NO.128/VITAMIN K 500 MCG/ML
1 LIQUID (ML) ORAL DAILY
Status: DISCONTINUED | OUTPATIENT
Start: 2022-11-11 | End: 2022-11-17 | Stop reason: HOSPADM

## 2022-11-10 RX ORDER — VALGANCICLOVIR 450 MG/1
450 TABLET, FILM COATED ORAL DAILY
Status: DISCONTINUED | OUTPATIENT
Start: 2022-11-11 | End: 2022-11-17 | Stop reason: HOSPADM

## 2022-11-10 RX ORDER — PANTOPRAZOLE SODIUM 40 MG/1
40 TABLET, DELAYED RELEASE ORAL
Status: DISCONTINUED | OUTPATIENT
Start: 2022-11-11 | End: 2022-11-17 | Stop reason: HOSPADM

## 2022-11-10 RX ORDER — ASPIRIN 81 MG/1
81 TABLET, CHEWABLE ORAL DAILY
Status: DISCONTINUED | OUTPATIENT
Start: 2022-11-11 | End: 2022-11-17 | Stop reason: HOSPADM

## 2022-11-10 RX ORDER — VALGANCICLOVIR 450 MG/1
450 TABLET, FILM COATED ORAL DAILY
Status: ON HOLD | DISCHARGE
Start: 2022-11-11 | End: 2022-11-16

## 2022-11-10 RX ORDER — SULFAMETHOXAZOLE AND TRIMETHOPRIM 400; 80 MG/1; MG/1
1 TABLET ORAL DAILY
Status: ON HOLD | DISCHARGE
Start: 2022-11-11 | End: 2022-11-16

## 2022-11-10 RX ORDER — DEXTROSE MONOHYDRATE 25 G/50ML
25-50 INJECTION, SOLUTION INTRAVENOUS
Status: DISCONTINUED | OUTPATIENT
Start: 2022-11-10 | End: 2022-11-17 | Stop reason: HOSPADM

## 2022-11-10 RX ORDER — SIMETHICONE 80 MG
80 TABLET,CHEWABLE ORAL EVERY 6 HOURS PRN
Status: DISCONTINUED | OUTPATIENT
Start: 2022-11-10 | End: 2022-11-17 | Stop reason: HOSPADM

## 2022-11-10 RX ORDER — NYSTATIN 100000/ML
500000 SUSPENSION, ORAL (FINAL DOSE FORM) ORAL 4 TIMES DAILY
Status: DISCONTINUED | OUTPATIENT
Start: 2022-11-10 | End: 2022-11-17 | Stop reason: HOSPADM

## 2022-11-10 RX ORDER — OXYCODONE HYDROCHLORIDE 5 MG/1
5 TABLET ORAL EVERY 4 HOURS PRN
Status: DISCONTINUED | OUTPATIENT
Start: 2022-11-10 | End: 2022-11-11

## 2022-11-10 RX ORDER — POLYETHYLENE GLYCOL 3350 17 G/17G
17 POWDER, FOR SOLUTION ORAL DAILY
Qty: 510 G | Status: ON HOLD | DISCHARGE
Start: 2022-11-10 | End: 2023-01-19

## 2022-11-10 RX ORDER — SENNOSIDES 8.6 MG
1-2 TABLET ORAL 2 TIMES DAILY PRN
Status: DISCONTINUED | OUTPATIENT
Start: 2022-11-10 | End: 2022-11-17 | Stop reason: HOSPADM

## 2022-11-10 RX ORDER — ASPIRIN 81 MG/1
81 TABLET, CHEWABLE ORAL DAILY
Status: ON HOLD | DISCHARGE
Start: 2022-11-11 | End: 2022-11-16

## 2022-11-10 RX ORDER — SENNOSIDES 8.6 MG
2 TABLET ORAL 2 TIMES DAILY
DISCHARGE
Start: 2022-11-10 | End: 2022-11-22

## 2022-11-10 RX ORDER — LIDOCAINE 4 G/G
2-3 PATCH TOPICAL EVERY 24 HOURS
Status: ON HOLD | DISCHARGE
Start: 2022-11-10 | End: 2022-11-16

## 2022-11-10 RX ORDER — MAGNESIUM OXIDE 400 MG/1
400 TABLET ORAL 2 TIMES DAILY
DISCHARGE
Start: 2022-11-10 | End: 2022-11-18

## 2022-11-10 RX ORDER — METHOCARBAMOL 500 MG/1
500 TABLET, FILM COATED ORAL 4 TIMES DAILY
Status: DISCONTINUED | OUTPATIENT
Start: 2022-11-10 | End: 2022-11-17 | Stop reason: HOSPADM

## 2022-11-10 RX ORDER — ONDANSETRON 4 MG/1
4 TABLET, ORALLY DISINTEGRATING ORAL EVERY 6 HOURS PRN
Status: DISCONTINUED | OUTPATIENT
Start: 2022-11-10 | End: 2022-11-17 | Stop reason: HOSPADM

## 2022-11-10 RX ORDER — GABAPENTIN 100 MG/1
200 CAPSULE ORAL 2 TIMES DAILY
Status: DISCONTINUED | OUTPATIENT
Start: 2022-11-10 | End: 2022-11-17 | Stop reason: HOSPADM

## 2022-11-10 RX ORDER — PANTOPRAZOLE SODIUM 40 MG/1
40 TABLET, DELAYED RELEASE ORAL DAILY
Status: ON HOLD | DISCHARGE
Start: 2022-11-11 | End: 2022-11-16

## 2022-11-10 RX ORDER — ONDANSETRON 2 MG/ML
4 INJECTION INTRAMUSCULAR; INTRAVENOUS EVERY 6 HOURS PRN
Status: DISCONTINUED | OUTPATIENT
Start: 2022-11-10 | End: 2022-11-17 | Stop reason: HOSPADM

## 2022-11-10 RX ORDER — HYDROXYZINE HYDROCHLORIDE 25 MG/1
25 TABLET, FILM COATED ORAL EVERY 6 HOURS PRN
Status: DISCONTINUED | OUTPATIENT
Start: 2022-11-10 | End: 2022-11-17 | Stop reason: HOSPADM

## 2022-11-10 RX ORDER — CALCIUM CARBONATE 500(1250)
500 TABLET ORAL 2 TIMES DAILY WITH MEALS
Status: DISCONTINUED | OUTPATIENT
Start: 2022-11-10 | End: 2022-11-17 | Stop reason: HOSPADM

## 2022-11-10 RX ADMIN — LIDOCAINE PATCH 4% 2 PATCH: 40 PATCH TOPICAL at 08:36

## 2022-11-10 RX ADMIN — ESCITALOPRAM OXALATE 10 MG: 10 TABLET ORAL at 08:34

## 2022-11-10 RX ADMIN — METHOCARBAMOL 500 MG: 500 TABLET ORAL at 12:16

## 2022-11-10 RX ADMIN — POTASSIUM PHOSPHATE, MONOBASIC 500 MG: 500 TABLET, SOLUBLE ORAL at 22:01

## 2022-11-10 RX ADMIN — MYCOPHENOLATE MOFETIL 750 MG: 250 CAPSULE ORAL at 08:35

## 2022-11-10 RX ADMIN — NYSTATIN 500000 UNITS: 100000 SUSPENSION ORAL at 22:00

## 2022-11-10 RX ADMIN — VALGANCICLOVIR 450 MG: 450 TABLET, FILM COATED ORAL at 08:35

## 2022-11-10 RX ADMIN — OXYCODONE HYDROCHLORIDE 5 MG: 5 TABLET ORAL at 02:50

## 2022-11-10 RX ADMIN — Medication 500 MG: at 18:20

## 2022-11-10 RX ADMIN — NYSTATIN 500000 UNITS: 100000 SUSPENSION ORAL at 08:42

## 2022-11-10 RX ADMIN — Medication 1 CAPSULE: at 12:16

## 2022-11-10 RX ADMIN — PANCRELIPASE 2 CAPSULE: 24000; 76000; 120000 CAPSULE, DELAYED RELEASE PELLETS ORAL at 13:11

## 2022-11-10 RX ADMIN — PANCRELIPASE 2 CAPSULE: 24000; 76000; 120000 CAPSULE, DELAYED RELEASE PELLETS ORAL at 18:20

## 2022-11-10 RX ADMIN — PANCRELIPASE 2 CAPSULE: 24000; 76000; 120000 CAPSULE, DELAYED RELEASE PELLETS ORAL at 08:41

## 2022-11-10 RX ADMIN — TRAZODONE HYDROCHLORIDE 50 MG: 50 TABLET ORAL at 22:00

## 2022-11-10 RX ADMIN — POTASSIUM PHOSPHATE, MONOBASIC 500 MG: 500 TABLET, SOLUBLE ORAL at 08:34

## 2022-11-10 RX ADMIN — OXYCODONE HYDROCHLORIDE 5 MG: 5 TABLET ORAL at 08:34

## 2022-11-10 RX ADMIN — POLYETHYLENE GLYCOL 3350 17 G: 17 POWDER, FOR SOLUTION ORAL at 08:36

## 2022-11-10 RX ADMIN — NYSTATIN 500000 UNITS: 100000 SUSPENSION ORAL at 12:16

## 2022-11-10 RX ADMIN — HYDROXYZINE HYDROCHLORIDE 50 MG: 25 TABLET, FILM COATED ORAL at 02:49

## 2022-11-10 RX ADMIN — OXYCODONE HYDROCHLORIDE 5 MG: 5 TABLET ORAL at 18:36

## 2022-11-10 RX ADMIN — OXYCODONE HYDROCHLORIDE 5 MG: 5 TABLET ORAL at 15:22

## 2022-11-10 RX ADMIN — LEVOTHYROXINE SODIUM 150 MCG: 0.05 TABLET ORAL at 08:34

## 2022-11-10 RX ADMIN — METHOCARBAMOL 500 MG: 500 TABLET ORAL at 22:00

## 2022-11-10 RX ADMIN — SULFAMETHOXAZOLE AND TRIMETHOPRIM 1 TABLET: 400; 80 TABLET ORAL at 08:34

## 2022-11-10 RX ADMIN — Medication 500 MG: at 12:16

## 2022-11-10 RX ADMIN — NYSTATIN 500000 UNITS: 100000 SUSPENSION ORAL at 18:20

## 2022-11-10 RX ADMIN — TACROLIMUS 6 MG: 5 CAPSULE ORAL at 18:19

## 2022-11-10 RX ADMIN — METHOCARBAMOL 500 MG: 500 TABLET ORAL at 18:19

## 2022-11-10 RX ADMIN — PANTOPRAZOLE SODIUM 40 MG: 40 TABLET, DELAYED RELEASE ORAL at 08:34

## 2022-11-10 RX ADMIN — METHOCARBAMOL 500 MG: 500 TABLET ORAL at 08:34

## 2022-11-10 RX ADMIN — HYDROXYZINE HYDROCHLORIDE 50 MG: 25 TABLET, FILM COATED ORAL at 08:57

## 2022-11-10 RX ADMIN — MYCOPHENOLATE MOFETIL 750 MG: 250 CAPSULE ORAL at 18:19

## 2022-11-10 RX ADMIN — MAGNESIUM OXIDE TAB 400 MG (241.3 MG ELEMENTAL MG) 400 MG: 400 (241.3 MG) TAB at 18:19

## 2022-11-10 RX ADMIN — TOPIRAMATE 50 MG: 50 TABLET ORAL at 08:34

## 2022-11-10 RX ADMIN — TACROLIMUS 6 MG: 5 CAPSULE ORAL at 08:34

## 2022-11-10 RX ADMIN — GABAPENTIN 200 MG: 100 CAPSULE ORAL at 22:00

## 2022-11-10 RX ADMIN — ASPIRIN 81 MG CHEWABLE TABLET 81 MG: 81 TABLET CHEWABLE at 08:34

## 2022-11-10 RX ADMIN — SENNOSIDES 2 TABLET: 8.6 TABLET, FILM COATED ORAL at 08:35

## 2022-11-10 RX ADMIN — INSULIN ASPART 2 UNITS: 100 INJECTION, SOLUTION INTRAVENOUS; SUBCUTANEOUS at 21:20

## 2022-11-10 RX ADMIN — GABAPENTIN 200 MG: 100 CAPSULE ORAL at 08:34

## 2022-11-10 RX ADMIN — DOCUSATE SODIUM 100 MG: 100 CAPSULE, LIQUID FILLED ORAL at 22:00

## 2022-11-10 RX ADMIN — MAGNESIUM OXIDE TAB 400 MG (241.3 MG ELEMENTAL MG) 400 MG: 400 (241.3 MG) TAB at 12:16

## 2022-11-10 ASSESSMENT — ACTIVITIES OF DAILY LIVING (ADL)
DIFFICULTY_EATING/SWALLOWING: NO
CONCENTRATING,_REMEMBERING_OR_MAKING_DECISIONS_DIFFICULTY: NO
WEAR_GLASSES_OR_BLIND: YES
WALKING_OR_CLIMBING_STAIRS_DIFFICULTY: NO
ADLS_ACUITY_SCORE: 31
EQUIPMENT_CURRENTLY_USED_AT_HOME: CANE, STRAIGHT
ADLS_ACUITY_SCORE: 26
DRESSING/BATHING_DIFFICULTY: NO
ADLS_ACUITY_SCORE: 41
TOILETING_ISSUES: NO
ADLS_ACUITY_SCORE: 31
ADLS_ACUITY_SCORE: 31
ADLS_ACUITY_SCORE: 26
VISION_MANAGEMENT: PRESCRIPTION GLASSES
DOING_ERRANDS_INDEPENDENTLY_DIFFICULTY: NO
FALL_HISTORY_WITHIN_LAST_SIX_MONTHS: NO
ADLS_ACUITY_SCORE: 26
ADLS_ACUITY_SCORE: 31
CHANGE_IN_FUNCTIONAL_STATUS_SINCE_ONSET_OF_CURRENT_ILLNESS/INJURY: YES
ADLS_ACUITY_SCORE: 31
ADLS_ACUITY_SCORE: 31

## 2022-11-10 NOTE — PLAN OF CARE
"Goal Outcome Evaluation:       /81 (BP Location: Right arm)   Pulse 69   Temp 98.3  F (36.8  C) (Oral)   Resp 16   Ht 1.676 m (5' 6\")   Wt 90 kg (198 lb 6.4 oz)   SpO2 99%   BMI 32.02 kg/m      Shift: 6554-7343  Neuro: Alert and oriented x4  Behaviors: Calm and cooperative, anxious    BG: ACHS, 160,173-treated with sliding scale per order. Carb coverage   Respiratory: clear lung sounds with diminished bases. No SOB reported.    Cardiac: WDL  Pain/Nausea: Back and abdominal pain   PRN: Oxy 5mg, maxalt for migraines, zofran, atarax   Diet: Regular diet with calorie counts   IV Access: Peripheral IV  Infusion(s): NA  Lines/Drains: PIV saline locked. R TERESITA dressing change. Small amount of drainage  GI/: BM 11/9.   Skin: Clamshell incision with staples   Mobility: Up with minimal assist, x1  Events/Education: Med card and lab book updated. Going over MTP videos with  tomorrow. Showered with OT.  Plan: Discharge home when medically stable   "

## 2022-11-10 NOTE — PROGRESS NOTES
Care Management Discharge Note    Discharge Date: 11/11/2022       Discharge Disposition: Transitional Care    Discharge Services:      Discharge DME:      Discharge Transportation: car, drives self, family or friend will provide    Private pay costs discussed: Not applicable    PAS Confirmation Code:  ([id number])  Patient/family educated on Medicare website which has current facility and service quality ratings:      Education Provided on the Discharge Plan:    Persons Notified of Discharge Plans: Pt, Pt's spouse, primary team and [facility]   Patient/Family in Agreement with the Plan:  Yes    Handoff Referral Completed: Yes    Additional Information:   Transplant Social Work Service Discharge Note      Patient Name:  Susan Puckett     Anticipated Discharge Date:  11/10/2022    Discharge Disposition:   TCU:  [facility]    Plan for 24 hour care for immediate post transplant period: Spouse and family (mother, adult children, friends)     If not local, plans for short term stay:  They live local    Additional Services/Equipment Arranged:  None at this time    Patient / Family response to discharge plan:  In favor of a short TCU stay    Education and resources provided by  at discharge: role of transplant  in out patient setting and provided contact info for , availability of support groups, and liver transplant support group    Discussed anticipated pharmacy out of pocket costs: YES    Provided [facility] Donor Letter Writing packet : YES    Plan: This writer will continue to follow outpatient.       EMEKA DoverSW

## 2022-11-10 NOTE — PROGRESS NOTES
CLINICAL NUTRITION SERVICES - BRIEF NOTE     Nutrition Prescription    Malnutrition Status:    Moderate malnutrition in the context of acute on chronic illness    Recommendations already ordered by Registered Dietitian (RD):  ONS: Ensure Clear (Mixed berry) w/ Meals TID   - Gelatin Plus trial (cherry and pineapple)     Future/Additional Recommendations:  -- Monitor for adequate PO intake   -- Continue calorie counts as ordered   -- Continue ONS as ordered      EVALUATION OF THE PROGRESS TOWARD GOALS   Diet: Regular with magic cup BID  Intake: Patient reports a fair appetite (not great but improving).    - Discussed w/ patient and spouse high protein/kcal diet options.     Juan counts: 2 day average  Total Kcals: 1230     Total Protein: 48g  11/8       Total Kcals: 1286     Total Protein: 38g - # Meals Recorded: 3; # Supplements Recorded: 100% 2 Ensure Clears   11/9       Total Kcals: 1173     Total Protein: 57g # Meals Recorded: 3; # Supplements Recorded: 100% 1 Ensure Clear   - Pt meeting 51% of low end of protein needs and meeting 65% of low end of energy needs, over past two days.      NEW FINDINGS   Updated Malnutrition assessment below.      MALNUTRITION  % Intake: < 75% for > 7 days (moderate)  % Weight Loss: None noted. However, true weight changes confounded by fluid shifts.  Subcutaneous Fat Loss: Facial region: Mild, Upper arm: Moderate and Lower arm: Mild  Muscle Loss: Temporal:  Mild and Facial & jaw region: Mild, Thoracic region (clavicle, acromium bone, deltoid, trapezius, pectoral):  Mild to Moderate, Upper arm (bicep, tricep): Moderate   Fluid Accumulation/Edema: 2+ Mild, Dependent and Generalized   Malnutrition Diagnosis: Moderate malnutrition in the context of acute on chronic illness       INTERVENTIONS  Provided education on, high protein/kcal diet x 8 weeks to increase nutrient needs post Txp  - Discussed w/ pt and spouse high protein foods/options and snack ideas to help increase intake to  exact protein/energy goals.   - Encouraged pt to continue intake as tolerated w/ BID to TID meals/day   - Encouraged small frequent meals w/ protein dense food choices    Medical food supplement therapy: Discussed options and encouraged continued oral supplements/need for increased protein/kcal intake.   - Pt would like to continue w/ Ensure Clear (Mixed berry) and discontinue Magic Cup (did not like)   - Pt willing to try Gelatin Plus (Cherry/ Pineapple)        Monitoring/Evaluation  Progress toward goals will be monitored and evaluated per protocol.     Chung Patel  Dietetic Intern     I have read and agree with the above re-assessment, evaluation, interventions and recommendations.    Bhavana Paige, MS, RD, LD, CCTD, CNSC  7A/Obs unit pager [phone]  Weekend pager [phone]

## 2022-11-10 NOTE — PROGRESS NOTES
DISCHARGE:  Patient with orders to discharge to [facility]     Education Provided:   Med Card yes  Lab Book yes  Handouts yes  Specialty Pharmacy completed   LDAs was removed PIV.     Discharge instructions, medications & follow ups reviewed with patient. [facility]. Belongings sent with the patient. [facility] notified, [facility].     Patient in stable condition. AVSS. patient had no further questions regarding discharge instructions and medications. Patient transferred out by 1600& left with all of her belongings and her script for narcotics.  Plan for Rehab.

## 2022-11-10 NOTE — PROGRESS NOTES
Care Management Follow Up    Length of Stay (days): 7    Expected Discharge Date: 11/11/2022     Concerns to be Addressed:       Patient plan of care discussed at interdisciplinary rounds: Yes    Anticipated Discharge Disposition: Home     Anticipated Discharge Services:    Anticipated Discharge DME:      Patient/family educated on Medicare website which has current facility and service quality ratings:    Education Provided on the Discharge Plan:    Patient/Family in Agreement with the Plan:      Referrals Placed by CM/SW:      Additional Information: anticipate discharge to TCU pending insurance auth, however, if insurance auth not secured, anticipate discharge home. Writer follow up on HC referrals.    1500: [facility] notified by [facility] that insurance auth approved. Discharge transportation arranged at 1600. Met with pt, reviewed discharge TCU, no concerns noted. Reviewed JORGE Cassidy transplant. Please refer to  notes for further details.      Home Care Referrals pending:     [facility]  (ph:[phone] fx:[phone])  left requesting return call; 11/10 VM left requesting return call.  [facility] (Ph: [phone] fx:[phone])- VM left requesting return call; 11/10: left a taken note message requesting return call.  [facility]  (ph:[phone] fx:[phone]) VM left requesting return call; 11/10: VM left requesting return call.     Home Care Agencies Declined  [facility] -don't take insurance  [facility] -don't take insurance  [facility]   -don't take insurance  [facility] -don't take insurance  [facility] -don't take insurance   [facility]   -don't take insurance  [facility] -don't take insurance  [facility] -don't take insurance  [facility] -don't take insurance  [facility]  -don't take insurance  [facility]  Unable to accept d/t transplant lab need  [facility] - unable to accept d/t staffing [facility] - unable to accept d/t  staffing  [facility] - unable to accept d/t insurance  [facility] unable to accept d/t staffing  [facility] - Unable to accept d/t capacity of  care  [facility] - unable to accept d/t staffing  [facility] - unable to accept d/t staffing and insurance  [facility] - unable to accept d/t insurance  [facility] - unable to take d/t staffing  [facility] - unable to take d/t insurance        Maribel Hodges RN

## 2022-11-10 NOTE — PLAN OF CARE
"VS: /74 (BP Location: Right arm)   Pulse 58   Temp 98.3  F (36.8  C) (Oral)   Resp 16   Ht 1.676 m (5' 6\")   Wt 90 kg (198 lb 6.4 oz)   SpO2 100%   BMI 32.02 kg/m      Cares: 2756-3462    Current condition: VSS on RA  Neuro: A&O x4  Cardio: WNL  Respiratory: Lung sounds diminished at bases   GI/: Voiding in hat, last BM 11/9  Skin: Clamshell incision stapled and TRACEY, old TERESITA site  Diet: Regular CC     Labs: Awaiting AM labs  BG: ACHS   LDA: PIV SL   Mobility: SBA - pt ambulated in halls, tolerated well   Pain: Pain at incision site  PRN medications: Oxy x1, atarax x1  Plan of Care: Continue to monitor, home today?      "

## 2022-11-10 NOTE — PROGRESS NOTES
Calorie Count  Intake recorded for: 11/9  Total Kcals: 1173 Total Protein: 57g  Kcals from Hospital Food: 1173  Protein: 57g  Kcals from Outside Food (average):0 Protein: 0g  # Meals Ordered from Kitchen: 3 meals   # Meals Recorded: 3 meals (First - 100% hard boiled egg, cream of wheat w/ brown sugar)       (Second - 75% grilled turkey & swiss cheese sandwich, less than 25% cottage cheese)       (Third - 100% grapes, 90% sherbet, 50% cheeseburger, less than 25% Italian roasted veggies)   # Supplements Recorded: 100% 1 Ensure Clear

## 2022-11-10 NOTE — PLAN OF CARE
Physical Therapy Discharge Summary    Reason for therapy discharge:    Discharged to transitional care facility.    Progress towards therapy goal(s). See goals on Care Plan in [facility] electronic health record for goal details.  Goals partially met.  Barriers to achieving goals:   discharge from facility.    Therapy recommendation(s):    Continued therapy is recommended.  Rationale/Recommendations:  Pt would benefit from additional skilled PT to progress gait training and bed mob.

## 2022-11-10 NOTE — PROGRESS NOTES
Transplant Surgery  Inpatient Daily Progress Note  11/10/2022    Assessment & Plan: Susan Puckett is a 52 year old female with PMH significant obesity s/p Faustino-en-Y, DM, HTN, CUETO cirrhosis c/b HE, EV, Hepatic hydrothorax. She is now s/p OLT on 11/2 with Dr. Delbert Morgan.    Graft function:  Liver transplant: POD # 8. Liver transaminases plateaued and trending down.  -JPs: removed lateral drain on 11/4, removed last drain on 11/5.     Graft patency ppx: ASA 81 daily  Liver US: POD #0, Patent doppler, Elevated RI=1 in HA  Repeat US POD #1, unchanged elevated RI in HA  Repeat US POD #2, continued elevated RIs, slightly improved flow in the left hepatic artery     Immunosuppression management:  Steroid taper induction  Maintenance:  Tac 6mg BID (goal 8-12) level 8.3 today.   MMF 750mg BID    Complexity of management:High. Contributing factors: induction, Flucon x2, now completed.     Hematology:   Acute blood loss/Anemia of chronic disease: Hgb stable, 9-10  Thrombocytopenia: due to liver disease, improving, Plts 137  Coagulopathy: due to liver disease, INR 1.3 on 11/4. Received 1U cryo overnight on 11/3.  Neurology:  Acute postoperative pain: Controlled. Continue Gabapentin BID, Oxycodone 5mg Q4, Robaxin 500mg QID, Atarax PRN. Added lidocaine patches.  Right thigh pain: US 11/6 no DVT.  Hx of hypoglycemic seizures: continue topamax  Anxiety/Depression: Continue lexapro, Trazadone, atarax PRN. Health psych consulted  Hx Migraine: PTA Rizatriptan PRN   Cardiorespiratory:   Hypoxia: Resolved, on room air. Encourage CDB/IS  GI/Nutrition:   At risk for malnutrition:  Reg diet +supplements as tolerated, minimal intake. RD following.   Hx of Gastric bypass:  SAMI mvi  Pancreatic insufficiency: on Creon PTA  Constipation: Reporting abdominal bloating. Continue bowel regimen senna and miralax. Dulcolax suppository PRN.  Endocrine:   Steroid induced hyperglycemia/DM: on Januvia and insulin PTA, on insulin gtt initially post  operatively. Discontinued insulin gtt and transitioned to SSI prn and receiving 12u Lantus at HS. Continue Carb coverage. Monitor closely with steroid tapered off.  Hypothyroidism: Continue synthroid   Fluid/Electrolytes:   Hypervolemia associated with renal insufficiency: Weight Up 11kg. Stable off diuretics, appears to be net negative per I&O.   Hypoalbuminemia: Albumin 2.9    GEORGETTE: Improving, SCr peaked at 1.75, at baseline 0.9-1.   Hypomagnesemia: Mag ox 400 mg BID. Supplement PRN.  Hypokalemia: K 4.3 this AM, receiving oral supplementation.    Hyponatremia: Na 133, stable.   Hypercalcemia: Oscal BID  Hypophosphatemia: Phos 1.8, cont kphos BID   : Rossi removed, continue strict I&Os   Infectious disease: Afebrile, WBC normal  Covid + 11/1: Cycle threshold =39.7, covid recovered       Prophylaxis: DVT (SCDs, GI (PPI), fungal (diflucan x1, nystatin), PCP ppx (bactrim), CMV ppx (valcyte)   Periop ppx zosyn x48hrs     Disposition: 7A, PT/OT. Appropriate for discharge tomorrow, trying to find support for home as spouse recently had his own surgery and is unable to offer complete support.    Medical Decision Making: Medium  Subsequent visit 37427 (moderate level decision making)    ANYA/Fellow/Resident Provider: Ange Sánchez NP        Faculty: Mil Tejada M.D.  __________________________________________________________________  Transplant History: Admitted 11/1/2022 for Liver transplant .    11/2/2022 (Liver), Postoperative day: 8     Interval History: History is obtained from the patient    Overnight events: Pain control is adequate. States she has been ambulating in the hallway and sits in the chair throughout the day. Voices understanding she needs to increase her oral nutritional intake.    ROS:   A 10-point review of systems was negative except as noted above.    Curent Meds:    amylase-lipase-protease  2 capsule Oral TID w/meals     aspirin  81 mg Oral Daily     calcium carbonate  500 mg Oral BID      "escitalopram  10 mg Oral or Feeding Tube Daily     gabapentin  200 mg Oral BID     insulin aspart   Subcutaneous TID w/meals     insulin aspart  1-10 Units Subcutaneous TID AC     insulin aspart  1-7 Units Subcutaneous At Bedtime     insulin glargine  12 Units Subcutaneous QAM AC     levothyroxine  150 mcg Oral or Feeding Tube QAM AC     lidocaine  2-3 patch Transdermal Q24H     lidocaine   Transdermal Q8H JULIEN     magnesium oxide  400 mg Oral BID     methocarbamol  500 mg Oral 4x Daily     multivitamin CF FORMULA  1 capsule Oral Daily     mycophenolate  750 mg Oral BID IS     nystatin  500,000 Units Swish & Swallow 4x Daily     pantoprazole  40 mg Oral Daily     polyethylene glycol  17 g Oral BID     potassium phosphate (monobasic)  500 mg Oral BID     sennosides  2 tablet Oral or Feeding Tube BID     sodium chloride (PF)  3 mL Intravenous Q8H     sulfamethoxazole-trimethoprim  1 tablet Oral or Feeding Tube Daily     tacrolimus  6 mg Oral BID IS     topiramate  50 mg Oral or Feeding Tube Daily     traZODone  50 mg Oral or Feeding Tube At Bedtime     valGANciclovir  450 mg Oral Daily       Physical Exam:     Admit Weight: 79.9 kg (176 lb 1.6 oz)    Current Vitals:   /71 (BP Location: Right arm)   Pulse 68   Temp 97.8  F (36.6  C) (Oral)   Resp 16   Ht 1.676 m (5' 6\")   Wt 89.5 kg (197 lb 5 oz)   SpO2 99%   BMI 31.85 kg/m      Vital sign ranges:    Temp:  [97.8  F (36.6  C)-98.3  F (36.8  C)] 97.8  F (36.6  C)  Pulse:  [58-69] 68  Resp:  [16-18] 16  BP: (102-124)/(67-81) 107/71  SpO2:  [97 %-100 %] 99 %  Patient Vitals for the past 24 hrs:   BP Temp Temp src Pulse Resp SpO2 Weight   11/10/22 0943 107/71 97.8  F (36.6  C) Oral 68 16 99 % --   11/10/22 0816 -- -- -- -- -- -- 89.5 kg (197 lb 5 oz)   11/10/22 0553 109/74 98.3  F (36.8  C) Oral 58 16 100 % --   11/10/22 0236 124/77 97.8  F (36.6  C) Oral 59 18 97 % --   11/09/22 2145 115/81 98.3  F (36.8  C) Oral 69 16 99 % --   11/09/22 1822 102/67 98.1  F " (36.7  C) Oral 63 16 100 % --   11/09/22 1419 103/71 98  F (36.7  C) Oral 64 -- 97 % --     General Appearance/psych: in NAD, tearful at times.  Skin: Normal, warm, no rashes, induration, or jaundice.  Heart: Regular rate and rhythm   Lungs: NLB on room air  Abdomen: Slightly distended, rounded, appropriately tender to palpation, incision with staples CDI. Old TERESITA site leaking serous drainage.  : Voiding  Extremities: BLE +1/4 edema  Neurologic: A&Ox3. CMS intact, Neha independently. Tremor absent. Asterixis: absent.    Frailty Scores     Frailty Scores 1/6/2022 10/18/2020    Final Score Frail Not Frail    Final Score Number 3 2          Data:   CMP  Recent Labs   Lab 11/10/22  1309 11/10/22  0936 11/09/22  0935 11/09/22  0647   NA  --  133*  --  130*   POTASSIUM  --  4.3  --  3.7   CHLORIDE  --  100  --  97*   CO2  --  25  --  22   * 177*   < > 189*   BUN  --  16.6  --  14.4   CR  --  0.99*  --  0.95   GFRESTIMATED  --  69  --  73   MAURI  --  7.6*  --  7.1*   MAG  --  2.2  --  2.1   PHOS  --  2.0*  --  1.8*   ALBUMIN  --  2.9*  --  2.7*   BILITOTAL  --  0.7  --  0.9   ALKPHOS  --  256*  --  224*   AST  --  39*  --  66*   ALT  --  108*  --  134*    < > = values in this interval not displayed.     CBC  Recent Labs   Lab 11/10/22  0936 11/09/22  0647   HGB 10.0* 9.7*   WBC 4.7 3.6*   * 92*     COAGS  Recent Labs   Lab 11/04/22  0607   INR 1.32*      Urinalysis  Recent Labs   Lab Test 11/05/22  0928 11/01/22  2133   COLOR Light Yellow Yellow   APPEARANCE Clear Clear   URINEGLC Negative Negative   URINEBILI Negative Negative   URINEKETONE 10* Negative   SG 1.026 1.015   UBLD Negative Negative   URINEPH 5.5 7.0   PROTEIN 20* Negative   NITRITE Negative Negative   LEUKEST Negative Negative   RBCU 1 1   WBCU 3 1     Virology:  Hepatitis C Antibody   Date Value Ref Range Status   11/01/2022 Nonreactive Nonreactive Final

## 2022-11-10 NOTE — PLAN OF CARE
Goal Outcome Evaluation:        Patient is a 52 year old female  admitted to room 422 via w/c.  Patient is alert and oriented X 4. See Epic for VS and assessment.  Patient is able to transfer SBA using walker. Patient was settled into their room, shown call light, tv, mealtimes etc. Oriented to unit. Will continue monitoring pain level and VS. Notifying MD with any concerns.  Follow MD orders for cares and medications.    Level of Schooling:college  Ethnicity:  Marital Status:  Dentures: No  Hearing Aid: No  Smoker:  No  Glasses: Yes  Occupation: Disability  Falls 0-1 mo: No 2-6 mo: No  Stairs prior function: Independent  Prior device use: Other No   [facility] Care Directive Referral to Social Work?Yes

## 2022-11-10 NOTE — PLAN OF CARE
Occupational Therapy Discharge Summary    Reason for therapy discharge:    Discharged to transitional care facility.    Progress towards therapy goal(s). See goals on Care Plan in [facility] electronic health record for goal details.  Goals partially met.  Barriers to achieving goals:   discharge from facility.    Therapy recommendation(s):    Continued therapy is recommended.  Rationale/Recommendations:  Recommend return home at d/c with assist from family and HHOT. Pt currently performing all ADLs and functional transfers with SBA and FWW. Has good social support from family at home.

## 2022-11-10 NOTE — PLAN OF CARE
"/71 (BP Location: Right arm)   Pulse 68   Temp 97.8  F (36.6  C) (Oral)   Resp 16   Ht 1.676 m (5' 6\")   Wt 89.5 kg (197 lb 5 oz)   SpO2 99%   BMI 31.85 kg/m      2096-2832. VSS on RA, afebrile. ACHS sugars, insulin administered for sliding scale and carb coverage. Patient endorses some abdominal pain, PRN oxy administered x1. Denies nausea. Patient also endorsed some anxiety, PRN atarax administered x1. Regular diet with good appetite. PIV x1 SL. LBM 11/9. Voiding. Clamshell incision stapled, TRACEY. Old TERESITA site with gauze at site for drainage. Patient up with SBA., went for multiple walks this shift. Patient to discharge later today to rehab.     "

## 2022-11-11 ENCOUNTER — APPOINTMENT (OUTPATIENT)
Dept: PHYSICAL THERAPY | Facility: SKILLED NURSING FACILITY | Age: 52
DRG: 949 | End: 2022-11-11
Attending: INTERNAL MEDICINE
Payer: COMMERCIAL

## 2022-11-11 ENCOUNTER — APPOINTMENT (OUTPATIENT)
Dept: OCCUPATIONAL THERAPY | Facility: SKILLED NURSING FACILITY | Age: 52
DRG: 949 | End: 2022-11-11
Attending: INTERNAL MEDICINE
Payer: COMMERCIAL

## 2022-11-11 ENCOUNTER — APPOINTMENT (OUTPATIENT)
Dept: MRI IMAGING | Facility: CLINIC | Age: 52
End: 2022-11-11
Attending: INTERNAL MEDICINE
Payer: COMMERCIAL

## 2022-11-11 LAB
ALBUMIN SERPL-MCNC: 2.6 G/DL (ref 3.4–5)
ALP SERPL-CCNC: 242 U/L (ref 40–150)
ALT SERPL W P-5'-P-CCNC: 92 U/L (ref 0–50)
ANION GAP SERPL CALCULATED.3IONS-SCNC: 7 MMOL/L (ref 3–14)
AST SERPL W P-5'-P-CCNC: 28 U/L (ref 0–45)
BILIRUB SERPL-MCNC: 0.7 MG/DL (ref 0.2–1.3)
BUN SERPL-MCNC: 17 MG/DL (ref 7–30)
CALCIUM SERPL-MCNC: 7.3 MG/DL (ref 8.5–10.1)
CHLORIDE BLD-SCNC: 103 MMOL/L (ref 94–109)
CO2 SERPL-SCNC: 23 MMOL/L (ref 20–32)
CREAT SERPL-MCNC: 0.98 MG/DL (ref 0.52–1.04)
ERYTHROCYTE [DISTWIDTH] IN BLOOD BY AUTOMATED COUNT: 15.6 % (ref 10–15)
GFR SERPL CREATININE-BSD FRML MDRD: 70 ML/MIN/1.73M2
GLUCOSE BLD-MCNC: 213 MG/DL (ref 70–99)
GLUCOSE BLDC GLUCOMTR-MCNC: 153 MG/DL (ref 70–99)
GLUCOSE BLDC GLUCOMTR-MCNC: 177 MG/DL (ref 70–99)
GLUCOSE BLDC GLUCOMTR-MCNC: 204 MG/DL (ref 70–99)
GLUCOSE BLDC GLUCOMTR-MCNC: 211 MG/DL (ref 70–99)
GLUCOSE BLDC GLUCOMTR-MCNC: 232 MG/DL (ref 70–99)
GLUCOSE BLDC GLUCOMTR-MCNC: 250 MG/DL (ref 70–99)
HCT VFR BLD AUTO: 25.7 % (ref 35–47)
HGB BLD-MCNC: 8.7 G/DL (ref 11.7–15.7)
HOLD SPECIMEN: NORMAL
MCH RBC QN AUTO: 31.5 PG (ref 26.5–33)
MCHC RBC AUTO-ENTMCNC: 33.9 G/DL (ref 31.5–36.5)
MCV RBC AUTO: 93 FL (ref 78–100)
PLATELET # BLD AUTO: 160 10E3/UL (ref 150–450)
POTASSIUM BLD-SCNC: 4.6 MMOL/L (ref 3.4–5.3)
PROT SERPL-MCNC: 5.2 G/DL (ref 6.8–8.8)
RBC # BLD AUTO: 2.76 10E6/UL (ref 3.8–5.2)
SODIUM SERPL-SCNC: 133 MMOL/L (ref 133–144)
WBC # BLD AUTO: 4.9 10E3/UL (ref 4–11)

## 2022-11-11 PROCEDURE — 250N000011 HC RX IP 250 OP 636: Performed by: INTERNAL MEDICINE

## 2022-11-11 PROCEDURE — 99356 PR PROLONGED SERV,INPATIENT,1ST HR: CPT | Performed by: NURSE PRACTITIONER

## 2022-11-11 PROCEDURE — 70553 MRI BRAIN STEM W/O & W/DYE: CPT | Mod: 26 | Performed by: RADIOLOGY

## 2022-11-11 PROCEDURE — 99207 PR NO BILLABLE SERVICE THIS VISIT: CPT | Performed by: PHYSICIAN ASSISTANT

## 2022-11-11 PROCEDURE — 250N000012 HC RX MED GY IP 250 OP 636 PS 637: Performed by: INTERNAL MEDICINE

## 2022-11-11 PROCEDURE — 97116 GAIT TRAINING THERAPY: CPT | Mod: GP

## 2022-11-11 PROCEDURE — 99306 1ST NF CARE HIGH MDM 50: CPT | Performed by: NURSE PRACTITIONER

## 2022-11-11 PROCEDURE — 250N000009 HC RX 250: Performed by: INTERNAL MEDICINE

## 2022-11-11 PROCEDURE — A9585 GADOBUTROL INJECTION: HCPCS | Performed by: INTERNAL MEDICINE

## 2022-11-11 PROCEDURE — 97165 OT EVAL LOW COMPLEX 30 MIN: CPT | Mod: GO

## 2022-11-11 PROCEDURE — 80053 COMPREHEN METABOLIC PANEL: CPT | Performed by: NURSE PRACTITIONER

## 2022-11-11 PROCEDURE — 36415 COLL VENOUS BLD VENIPUNCTURE: CPT | Performed by: INTERNAL MEDICINE

## 2022-11-11 PROCEDURE — 99309 SBSQ NF CARE MODERATE MDM 30: CPT | Performed by: INTERNAL MEDICINE

## 2022-11-11 PROCEDURE — 85014 HEMATOCRIT: CPT | Performed by: INTERNAL MEDICINE

## 2022-11-11 PROCEDURE — 250N000013 HC RX MED GY IP 250 OP 250 PS 637: Performed by: INTERNAL MEDICINE

## 2022-11-11 PROCEDURE — 999N000127 HC STATISTIC PERIPHERAL IV START W US GUIDANCE

## 2022-11-11 PROCEDURE — 97530 THERAPEUTIC ACTIVITIES: CPT | Mod: GP

## 2022-11-11 PROCEDURE — 97535 SELF CARE MNGMENT TRAINING: CPT | Mod: GO

## 2022-11-11 PROCEDURE — 255N000002 HC RX 255 OP 636: Performed by: INTERNAL MEDICINE

## 2022-11-11 PROCEDURE — 97161 PT EVAL LOW COMPLEX 20 MIN: CPT | Mod: GP

## 2022-11-11 PROCEDURE — 999N000040 HC STATISTIC CONSULT NO CHARGE VASC ACCESS

## 2022-11-11 PROCEDURE — 70553 MRI BRAIN STEM W/O & W/DYE: CPT

## 2022-11-11 PROCEDURE — 120N000009 HC R&B SNF

## 2022-11-11 RX ORDER — LORAZEPAM 0.5 MG/1
1 TABLET ORAL
Status: COMPLETED | OUTPATIENT
Start: 2022-11-11 | End: 2022-11-11

## 2022-11-11 RX ORDER — IOPAMIDOL 755 MG/ML
100 INJECTION, SOLUTION INTRAVASCULAR ONCE
Status: COMPLETED | OUTPATIENT
Start: 2022-11-11 | End: 2022-11-11

## 2022-11-11 RX ORDER — GADOBUTROL 604.72 MG/ML
9 INJECTION INTRAVENOUS ONCE
Status: COMPLETED | OUTPATIENT
Start: 2022-11-11 | End: 2022-11-11

## 2022-11-11 RX ORDER — AMOXICILLIN 250 MG
2 CAPSULE ORAL 2 TIMES DAILY
Status: DISCONTINUED | OUTPATIENT
Start: 2022-11-11 | End: 2022-11-17 | Stop reason: HOSPADM

## 2022-11-11 RX ORDER — POLYETHYLENE GLYCOL 3350 17 G/17G
17 POWDER, FOR SOLUTION ORAL DAILY
Status: DISCONTINUED | OUTPATIENT
Start: 2022-11-11 | End: 2022-11-17 | Stop reason: HOSPADM

## 2022-11-11 RX ORDER — HYDROMORPHONE HYDROCHLORIDE 2 MG/1
2 TABLET ORAL EVERY 6 HOURS PRN
Status: DISCONTINUED | OUTPATIENT
Start: 2022-11-11 | End: 2022-11-17 | Stop reason: HOSPADM

## 2022-11-11 RX ADMIN — METHOCARBAMOL 500 MG: 500 TABLET ORAL at 12:56

## 2022-11-11 RX ADMIN — Medication 500 MG: at 21:00

## 2022-11-11 RX ADMIN — PANCRELIPASE 2 CAPSULE: 24000; 76000; 120000 CAPSULE, DELAYED RELEASE PELLETS ORAL at 21:05

## 2022-11-11 RX ADMIN — Medication 500 MG: at 09:34

## 2022-11-11 RX ADMIN — PANCRELIPASE 2 CAPSULE: 24000; 76000; 120000 CAPSULE, DELAYED RELEASE PELLETS ORAL at 12:56

## 2022-11-11 RX ADMIN — MYCOPHENOLATE MOFETIL 750 MG: 250 CAPSULE ORAL at 21:01

## 2022-11-11 RX ADMIN — INSULIN ASPART 2 UNITS: 100 INJECTION, SOLUTION INTRAVENOUS; SUBCUTANEOUS at 09:43

## 2022-11-11 RX ADMIN — LORAZEPAM 1 MG: 0.5 TABLET ORAL at 16:44

## 2022-11-11 RX ADMIN — POLYETHYLENE GLYCOL 3350 17 G: 17 POWDER, FOR SOLUTION ORAL at 14:54

## 2022-11-11 RX ADMIN — INSULIN ASPART 2 UNITS: 100 INJECTION, SOLUTION INTRAVENOUS; SUBCUTANEOUS at 13:01

## 2022-11-11 RX ADMIN — NYSTATIN 500000 UNITS: 100000 SUSPENSION ORAL at 12:56

## 2022-11-11 RX ADMIN — SENNOSIDES AND DOCUSATE SODIUM 2 TABLET: 50; 8.6 TABLET ORAL at 21:00

## 2022-11-11 RX ADMIN — MYCOPHENOLATE MOFETIL 750 MG: 250 CAPSULE ORAL at 09:31

## 2022-11-11 RX ADMIN — NYSTATIN 500000 UNITS: 100000 SUSPENSION ORAL at 21:01

## 2022-11-11 RX ADMIN — POTASSIUM PHOSPHATE, MONOBASIC 500 MG: 500 TABLET, SOLUBLE ORAL at 09:34

## 2022-11-11 RX ADMIN — PANTOPRAZOLE SODIUM 40 MG: 40 TABLET, DELAYED RELEASE ORAL at 06:36

## 2022-11-11 RX ADMIN — ASPIRIN 81 MG CHEWABLE TABLET 81 MG: 81 TABLET CHEWABLE at 09:32

## 2022-11-11 RX ADMIN — MAGNESIUM OXIDE TAB 400 MG (241.3 MG ELEMENTAL MG) 400 MG: 400 (241.3 MG) TAB at 09:32

## 2022-11-11 RX ADMIN — LIDOCAINE PATCH 4% 2 PATCH: 40 PATCH TOPICAL at 09:30

## 2022-11-11 RX ADMIN — GABAPENTIN 200 MG: 100 CAPSULE ORAL at 21:00

## 2022-11-11 RX ADMIN — TACROLIMUS 6 MG: 5 CAPSULE ORAL at 09:31

## 2022-11-11 RX ADMIN — POTASSIUM PHOSPHATE, MONOBASIC 500 MG: 500 TABLET, SOLUBLE ORAL at 21:01

## 2022-11-11 RX ADMIN — GADOBUTROL 9 ML: 604.72 INJECTION INTRAVENOUS at 19:55

## 2022-11-11 RX ADMIN — NYSTATIN 500000 UNITS: 100000 SUSPENSION ORAL at 16:44

## 2022-11-11 RX ADMIN — METHOCARBAMOL 500 MG: 500 TABLET ORAL at 16:44

## 2022-11-11 RX ADMIN — ESCITALOPRAM OXALATE 10 MG: 10 TABLET ORAL at 09:32

## 2022-11-11 RX ADMIN — IOPAMIDOL 75 ML: 755 INJECTION, SOLUTION INTRAVENOUS at 11:05

## 2022-11-11 RX ADMIN — ONDANSETRON 4 MG: 4 TABLET, ORALLY DISINTEGRATING ORAL at 16:07

## 2022-11-11 RX ADMIN — HYDROXYZINE HYDROCHLORIDE 50 MG: 25 TABLET, FILM COATED ORAL at 02:59

## 2022-11-11 RX ADMIN — TOPIRAMATE 50 MG: 50 TABLET ORAL at 09:32

## 2022-11-11 RX ADMIN — HYDROMORPHONE HYDROCHLORIDE 2 MG: 2 TABLET ORAL at 17:38

## 2022-11-11 RX ADMIN — TACROLIMUS 6 MG: 5 CAPSULE ORAL at 21:00

## 2022-11-11 RX ADMIN — DOCUSATE SODIUM 100 MG: 100 CAPSULE, LIQUID FILLED ORAL at 09:35

## 2022-11-11 RX ADMIN — INSULIN GLARGINE 12 UNITS: 100 INJECTION, SOLUTION SUBCUTANEOUS at 09:43

## 2022-11-11 RX ADMIN — METHOCARBAMOL 500 MG: 500 TABLET ORAL at 09:32

## 2022-11-11 RX ADMIN — RIZATRIPTAN BENZOATE 10 MG: 10 TABLET ORAL at 16:44

## 2022-11-11 RX ADMIN — GABAPENTIN 200 MG: 100 CAPSULE ORAL at 09:32

## 2022-11-11 RX ADMIN — NYSTATIN 500000 UNITS: 100000 SUSPENSION ORAL at 09:32

## 2022-11-11 RX ADMIN — OXYCODONE HYDROCHLORIDE 5 MG: 5 TABLET ORAL at 02:59

## 2022-11-11 RX ADMIN — BISACODYL 10 MG: 10 SUPPOSITORY RECTAL at 22:32

## 2022-11-11 RX ADMIN — LEVOTHYROXINE SODIUM 150 MCG: 75 TABLET ORAL at 06:36

## 2022-11-11 RX ADMIN — HYDROMORPHONE HYDROCHLORIDE 2 MG: 2 TABLET ORAL at 22:27

## 2022-11-11 RX ADMIN — METHOCARBAMOL 500 MG: 500 TABLET ORAL at 21:01

## 2022-11-11 RX ADMIN — PANCRELIPASE 2 CAPSULE: 24000; 76000; 120000 CAPSULE, DELAYED RELEASE PELLETS ORAL at 09:30

## 2022-11-11 RX ADMIN — SODIUM CHLORIDE 80 ML: 9 INJECTION, SOLUTION INTRAVENOUS at 11:08

## 2022-11-11 RX ADMIN — SULFAMETHOXAZOLE AND TRIMETHOPRIM 1 TABLET: 400; 80 TABLET ORAL at 09:32

## 2022-11-11 RX ADMIN — VALGANCICLOVIR HYDROCHLORIDE 450 MG: 450 TABLET ORAL at 09:32

## 2022-11-11 RX ADMIN — Medication 1 CAPSULE: at 12:56

## 2022-11-11 RX ADMIN — OXYCODONE HYDROCHLORIDE 5 MG: 5 TABLET ORAL at 09:48

## 2022-11-11 ASSESSMENT — ACTIVITIES OF DAILY LIVING (ADL)
ADLS_ACUITY_SCORE: 31
ADLS_ACUITY_SCORE: 29
ADLS_ACUITY_SCORE: 29
ADLS_ACUITY_SCORE: 31
ADLS_ACUITY_SCORE: 29
ADLS_ACUITY_SCORE: 31
ADLS_ACUITY_SCORE: 29
PREVIOUS_RESPONSIBILITIES: HOUSEKEEPING;LAUNDRY;MEDICATION MANAGEMENT;FINANCES;DRIVING
ADLS_ACUITY_SCORE: 29

## 2022-11-11 NOTE — CODE/RAPID RESPONSE
Arrived at stroke code at 1036,  ICU NP already assessing patient.    Placed 20 g IV in right posterior forearm, took two attempts.  Labs sent as extra tubes     Brought to CT and back.  Only noted deficit is stutter or word finding, no changes in time I spent with patient    Reported to have started last night per

## 2022-11-11 NOTE — CARE PLAN
Problem: Liver Transplant  Goal: Absence of Infection Signs and Symptoms  Description: Optimize activity and mobility to maximize infection resistance (e.g., reposition, sit in chair, ambulate).    Maintain normothermia and glycemic control to maximize infection resistance.    Maintain dressing and closed drainage system integrity to reduce the risk for infection; inspect incision as visible.    Implement transmission-based precautions, protective measures and isolation, as indicated, to prevent spread of infection.  Outcome: Progressing     Problem: Pain Acute  Goal: Optimal Pain Control and Function  Description: Evaluate pain level, effect of treatment and patient response at regular intervals.    Minimize painful stimuli; coordinate care and adjust environment (e.g., light, noise, unnecessary movement); promote sleep/rest.    Match pharmacologic analgesia to severity and type of pain mechanism (e.g., neuropathic, muscle, inflammatory); consider multimodal approach (e.g., nonopioid, opioid, adjuvant).    Provide medication at regular intervals; titrate to patient response; premedicate for painful procedures.    Manage breakthrough pain with additional doses; consider rotation or switching medication.  Outcome: Progressing     Problem: Depression  Goal: Improved Mood  Description: Provide opportunity for patient and family/caregiver to express feelings, stressors and self-perception (e.g., verbalization, journaling).    Convey caring approach, empathy and potential for change; hope is key for recovery.    Utilize existing coping strategies and assist in developing new strategies, such as relaxation, music and problem-solving; assess for ineffective strategies (e.g., substance use, isolation).    Recognize past and present achievements, successes and personal strengths.    Empower participation in planning care and activities, as well as development of attainable goals, to increase self-esteem and feelings of  control.  Outcome: Progressing     Problem: Diabetic Ketoacidosis  Goal: Fluid and Electrolyte Balance with Absence of Ketosis  Description: Administer insulin therapy to reverse ketogenesis and correct hyperglycemia; anticipate ongoing adjustment.    Monitor trends for blood glucose levels, pH, electrolytes, vital signs, serum ketones, intake and output; advocate for treatment adjustment.    Correct imbalance based on laboratory values; electrolyte levels may stabilize after initial fluid replacement.    Anticipate initiation of dextrose-containing fluid therapy as blood glucose levels decrease.  Outcome: Progressing   Goal Outcome Evaluation: on-going, new admission 11/10/2022

## 2022-11-11 NOTE — PROGRESS NOTES
11/11/22 0641   Appointment Info   Signing Clinician's Name / Credentials (OT) Donya Her, OT      Language English   Living Environment   People in Home spouse   Current Living Arrangements house   Home Accessibility stairs to enter home   Number of Stairs, Main Entrance 3   Transportation Anticipated family or friend will provide   Living Environment Comments See PT noted for additional details. Pt lives w/  who just had surgery and has restrictions. Pt lives in a house w/ basement in the laundry but reports she will not be having to down downstairs when she is discharged home. Pt has a tub/shower and shower chair.  plans to install grab bars and she has already obtained a raised toilet seat for the bathroom.   Self-Care   Usual Activity Tolerance moderate   Current Activity Tolerance fair   Regular Exercise No   Equipment Currently Used at Home cane, straight   Activity/Exercise/Self-Care Comment Pt was IND w/ ADLs and occassionally walks w/ a straight cane.   Instrumental Activities of Daily Living (IADL)   Previous Responsibilities housekeeping;laundry;medication management;finances;driving   IADL Comments Shared responsibility w/ finances, cooking and housekeeping. Pt reports she was doing IADLs variably depending on her energy level. Pt was driving and  set up her medications in a medbox.   General Information   Onset of Illness/Injury or Date of Surgery 11/02/22   Referring Physician Delbert Morgan MD   Patient/Family Therapy Goal Statement (OT) Manage my abdoominal pain.   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 She was on disability prior to her transplant.   Existing Precautions/Restrictions abdominal;lifting;other (see comments);fall   Left Upper Extremity (Weight-bearing Status) other (see comments)   Right Upper Extremity (Weight-bearing Status) other (see comments)   Heart Disease Risk Factors Gender;Age  "  Cognitive Status Examination   Orientation Status orientation to person, place and time   Follows Commands WNL   Cognitive Status Comments Pt reported new stuttering/speech impediment affecting her ability and flow of speech. Pt needing additional time to communicate and response to questions. Pt was tearful and crying, reporting, \"I'm scared,\" in response to new stuttering/speech impediment. Nursing in room and will discuss w/ provider.   Visual Perception   Visual Impairment/Limitations corrective lenses for reading;WNL   Sensory   Sensory Comments TBD   Pain Assessment   Patient Currently in Pain Yes, see Vital Sign flowsheet   Range of Motion Comprehensive   General Range of Motion bilateral upper extremity ROM WFL   Comment, General Range of Motion With abdominal precautions. RUE ROM more limited than LUE.   Strength Comprehensive (MMT)   Comment, General Manual Muscle Testing (MMT) Assessment No MMT d/t pain/abdominal precautions but pt reported weakness in RUE due to hx of blood clot in RUE.   Coordination   Upper Extremity Coordination Right UE impaired   Gross Motor Coordination RUE   Coordination Comments Per pt self report.   Transfers   Transfer Comments Ax 1 close SBA, additional effort and time. Pt using full support of UE for sit<->stand and use of cane on R side.   Clinical Impression   Criteria for Skilled Therapeutic Interventions Met (OT) Yes, treatment indicated   OT Diagnosis ADL/IADL and mobility impairment.   OT Problem List-Impairments impacting ADL balance;pain;strength;sensation;post-surgical precautions;mobility   Assessment of Occupational Performance 3-5 Performance Deficits   Identified Performance Deficits Dressing, g/h tasks, showering, functional transfers and functional mobility.   Planned Therapy Interventions (OT) ADL retraining;IADL retraining;balance training;motor coordination training;ROM;strengthening;transfer training;home program guidelines;progressive " activity/exercise;risk factor education   Clinical Decision Making Complexity (OT) low complexity   Anticipated Equipment Needs Upon Discharge (OT) shower chair;tub bench   Risk & Benefits of therapy have been explained evaluation/treatment results reviewed;care plan/treatment goals reviewed;risks/benefits reviewed;current/potential barriers reviewed;participants voiced agreement with care plan;participants included;patient   Clinical Impression Comments 52 year old female with PMH significant obesity s/p Faustino-en-Y, DM2, HTN, CUETO cirrhosis c/b HE, EV, Hepatic hydrothorax. She is s/p OLTx 11/2   OT Total Evaluation Time   OT Eval, Low Complexity Minutes (28722) 15   Therapy Certification   Start of Care Date 11/10/22   Certification date from 11/10/22   Certification date to 12/10/22   Medical Diagnosis Liver transplant   OT Goals   Therapy Frequency (OT) 6 times/wk   OT Predicted Duration/Target Date for Goal Attainment 11/21/22   OT: Hygiene/Grooming modified independent   OT: Upper Body Dressing Modified independent   OT: Lower Body Dressing Modified independent   OT: Upper Body Bathing Supervision/stand-by assist   OT: Lower Body Bathing Supervision/stand-by assist   OT: Bed Mobility Modified independent   OT: Transfer Modified independent   OT: Toilet Transfer/Toileting Modified independent   OT: Meal Preparation Supervision/stand-by assist   OT: Goal 1 Pt will complete tub tsfer w/ tub bench/shower chair transfer and grab bars w/ SBA.   Self-Care/Home Management   Self-Care/Home Mgmt/ADL, Compensatory, Meal Prep Minutes (98313) 10   Treatment Detail/Skilled Intervention OT: OT POC and goals initiated. Pt reported speech impedament that ws new this date.RN was in for medication management and pt reporting usual pain in abdominal incision and R side. Pt declined TB ADLs but agreeable to ambulation and complete tsfers. Pt up in chair and needing additional time to stand w/ full support of UE sit to stand. Pt  ambulated to outsdie of door w/ slow steady pace, coughing occassionally and holding her abdomen w/ SBA x 1 w/ cane. Provider came in to see pt and intervention was terminated to discuss pt's new symptoms. Pt denied prior to intervention any new weakness but only speech impedicment w/ delay and stuttering noted.   OT Discharge Planning   OT Plan OT: Monitor further new symptoms as pt had stroke code called prior to intervention. Pt is now back and symptoms un-related to stroke. Pt would like shower ok w/ abdominal incision getting wet, may need to be covered for shower. Ambulate to shower w/ w/c follow if pt is not too tired. TB ADL, finish GG codes.   OT Discharge Recommendation (DC Rec) home with home care occupational therapy   OT Rationale for DC Rec Post transplant patient and will benefit for carry over of intervention in home environment.   OT Brief overview of current status See clinical impression.   Total Session Time   Timed Code Treatment Minutes 10   Total Session Time (sum of timed and untimed services) 25   Transfers: Sit to Stand   Describe Performance OT: CGA w/ SEC   Transfers: Chair/Bed transfers   Describe Performance OT: CGA w/ SEC

## 2022-11-11 NOTE — PLAN OF CARE
Goal Outcome Evaluation:      Plan of Care Reviewed With: patient, family    Overall Patient Progress: improvingOverall Patient Progress: improving    Outcome Evaluation: RD visited pt/family at bedside, provided handout on protein and will give supplements PRN, see RD progress note for full details

## 2022-11-11 NOTE — PROGRESS NOTES
CLINICAL NUTRITION SERVICES - ASSESSMENT NOTE     Nutrition Prescription    RECOMMENDATIONS FOR MDs/PROVIDERS TO ORDER:  None today     Malnutrition Status:    Moderate malnutrition in the context of acute on chronic illness or injury     Recommendations already ordered by Registered Dietitian (RD):  Ensure Clear PRN - do not auto send at this time as pt has some stock in room, pt will request when wanted, pt can ask for any other supplement on formulary at any time    Education: Provided pt/family handout on protein sources in diet, reminded pt on post transplant food safety guidelines, pt/family agreeing to further follow up on this prior to discharge     Future/Additional Recommendations:  Monitor meal/supplement intakes, weight/lab trends   Follow up on diet education needs prior to discharge      REASON FOR ASSESSMENT  Susan Puckett is a/an 52 year old female assessed by the dietitian for Provider Order - Nutrition optimization    NUTRITION/MEDICAL HISTORY  Per chart review: Pt admits to TCU for rehabilitation in the setting of s/p orthotopic liver transplant on 11/2/22, course complicated by multiple metabolic derangements including hypokalemia, hyponatremia, hypophosphatemia, blood loss anemia, and elevated LFTs. Other past medical history noted for nonalcoholic steatohepatitis complicated by cirrhosis s/p TIPS, pleural effusion, ascites, hepatic encephalopathy, hepatic hydrothorax s/p multiple thoracenteses, anemia, insulin-dependent diabetes, GERD hypothyroidism, depression, anxiety, migraines, HSV infection, Faustino-en-Y gastric bypass.    Per pt visit: Visited pt and family at bedside, pt reports appetite still not great, but notes constipation (did relay pt's request for suppository to charge RN), reviewed prior orders for supplement and noted pt had some Ensure Clear in room, pt agreeing to orders above. Reviewed the importance of adequate nutritional intakes for continued recovery from transplant, noted  "fluid status/constipation/bloating/swelling all likely contributing factor to continued decreased appetite and will slowly resolve, pt/family understand need for protein and was provided a list of protein foods per  request,  plans to also provide outside foods to support pt's intakes. Briefly reviewed post transplant food safety and pt notes has already been given information for this, discussed can further review prior to discharge from TCU, pt/ agreeing with plan of care.     CURRENT NUTRITION ORDERS  Diet: Regular  Intake/Tolerance: 50% thus far per flow sheets     LABS  Labs reviewed    MEDICATIONS  PRN Dilaudid, Lexapro, high insulin resistance correction scale QID, insulin to CHO dosing at meals, Lantus, Valcyte, Mycophenolate, Tacrolimus, Creon with meals and snacks, Miralax, Senna-docusate, Oscal, Mag-ox, K-phos, Bactrim, Nystatin, DEKAS multi vitamin, Synthroid, Protonix     ANTHROPOMETRICS  Ht Readings from Last 1 Encounters:   11/02/22 1.676 m (5' 6\")   Most Recent Weight: 90.9 kg (200 lb 6.4 oz)    IBW: 59 kg  BMI: Obesity Grade I BMI 30-34.9  Weight History:   11/10/22 0816 89.5 kg (197 lb 5 oz) Standing scale   11/09/22 1016 90 kg (198 lb 6.4 oz) Standing scale   11/08/22 0106 90.2 kg (198 lb 14.4 oz) Standing scale   11/06/22 0746 92.6 kg (204 lb 1.6 oz) Standing scale   11/05/22 0800 95.5 kg (210 lb 9.6 oz) Standing scale   11/03/22 0400 79.8 kg (175 lb 14.8 oz) Bed scale   11/02/22 1630 79.9 kg (176 lb 1.6 oz) Bed scale     09/15/22 78.2 kg (172 lb 8 oz)   07/14/22 75.9 kg (167 lb 4.8 oz)   02/20/22 69.4 kg (152 lb 14.4 oz)   11/06/21 74.5 kg (164 lb 4.8 oz)     Weight assessment: Possible 25 lb wt gain in 1 week and >1 month vs fluid status changes - difficult to fully assess weight trends at present. Prior RD notes indicate pt's more UBW was between 155-160 lbs (will assume this may be an estimated dry weight for pt as this seem accurate to other history trend available). "     Dosing Weight: 64 kg - using weight from 9/15/22 and adjusting for >120% of ideal BW     ASSESSED NUTRITION NEEDS  Estimated Energy Needs: 5317-3997 kcals/day (30 - 35 kcals/kg)  Justification: Post transplant   Estimated Protein Needs:  grams protein/day (1.5 - 2 grams of pro/kg)  Justification: Post transplant   Estimated Fluid Needs: 7751-3501 mL/day (25 - 30 mL/kg)   Justification: Maintenance    MALNUTRITION  % Intake: Decreased intake does not meet criteria  % Weight Loss: Difficult to assess due to fluid status   Subcutaneous Fat Loss: Facial region: mild   Muscle Loss: Temporal: mild   Fluid Accumulation/Edema: Mild per flow sheets   Malnutrition Diagnosis: Moderate malnutrition in the context of acute on chronic illness or injury     NUTRITION DIAGNOSIS  Inadequate oral intake vs increased nutrient needs related to multifactorial (appetite, altered GI function, post transplant status) as evidenced by pt reports, meal intakes of 50% thus far, therapeutic recommendations     INTERVENTIONS  Implementation  Nutrition Education: Provided handout on protein, briefly reviewed post transplant food safety    Medical food supplement therapy - ordered as above      Goals  Patient to consume % of nutritionally adequate meal trays TID, or the equivalent with supplements/snacks.     Monitoring/Evaluation  Progress toward goals will be monitored and evaluated per protocol.    Sarah Beth Randhawa RD, CNSC, LD  TCU RD pager: [phone]  Weekend/holiday pager: [phone]

## 2022-11-11 NOTE — PLAN OF CARE
Pt is A&OX4, anxious, but cooperative with care. Denied CP, SOB, & n/v. SBA with walker. Continent for both B&B; uses the bathroom. Takes med whole with thin liquid. Managed pain with PRN oxy. & atarax. Pt slept well for most of the night. Able to make needs known & call light is within reach. Will continue with plan of care.    Patient's most recent vital signs are:     Vital signs:  BP: 112/64  Temp: 97.9  HR: 67  RR: 20  SpO2: 95 %     Patient does not have new respiratory symptoms.  Patient does not have new sore throat.  Patient does not have a fever greater than 99.5.

## 2022-11-11 NOTE — PROGRESS NOTES
11/11/22 1400   Name of Certified Therapeutic Rec Specialist   Name of Certified Therapeutic Rec Specialist VANESSA Nugent   Appointment Type   Type of Therapeutic Rec Session Therapeutic Rec Assessment   General Information   Patient Profile Review See Profile for full history and prior level of function   Daily Contact with Relatives or Friends Phone call;Visit   Pets Dog   Community Involvement   Spiritual Practice [facility] ? Yes   Outings   (will request if needed)   Hobbies/Interests   Cards Other (see comments)  (skipbo and variety of card games)   Games Scrabble;Other (comments)  (Sequence, [place], Whist)   Music   Music Preferences Spiritual   Listens to Recorded Music Yes   Brought Own Equipment No   Media   Computer Will use tablet/phone;Has own at home   TV / Movies TV   Sports / Physical Activities   Outdoor Activities Outdoor gardening   Sports/Exercise Walks   Sports Fan Football   Impression   Open to Socializing with Others Initiates with cues   Barriers to Leisure Endurance;Mobility   Patient, family and / or staff in agreement with Plan of Care Yes   Treatment Plan   Interested in Unit [place]? No   Type of Intervention Independent with activity   Equipment and Supplies While on Unit Radio;Other (comments)  (adult coloring sheets, aromatherapy sticks)   Assessment Assessment completed, pt and  were oriented to role of rec therapy and provided with leisure resource list. Pt expressed interest in and was provided with coloring sheets, radio and aromatherapy. Will provide additional resources as needed.      Lot #: [id number] Lot #: [id number]

## 2022-11-11 NOTE — PLAN OF CARE
Pt is A&OX4, anxious, & cooperative with care. Denied CP, SOB, & n/v. VSS & on RA. SBA with walker. Continent for both B&B; uses the bathroom. LBM was on 11/09/22. Pt is on a regular diet & ordered food from [employer]. On sliding scale & carb coverage insulin. Takes med whole with thin liquid. Incision across the abd with staples, TRACEY, no drainage, & no s/s of infection. Dressing on R abd from removed TERESITA. Bruise on R neck from previously removed central line. Mild dependent edema to the bilateral feet. Pneumatic compression device on & aqua heating pad under pt's back. Pt is able to make needs known & call light within reach. Will continue with plan of care.    Patient's most recent vital signs are:     Vital signs:  BP: 112/64  Temp: 97.9  HR: 67  RR: 20  SpO2: 95 %     Patient does not have new respiratory symptoms.  Patient does not have new sore throat.  Patient does not have a fever greater than 99.5.

## 2022-11-11 NOTE — PHARMACY-TRANSPLANT NOTE
Solid Organ Transplant Recipient Prior to Discharge Note    53 yo F s/p OLT 11/2/22 2/2 [place]     PMH: RNY gastric bypass surgery (2006), GERD, line associated upper extremity DVT (2/2022), anemia, diabetes, history of diabetic seizure, hypothyroidism, HSV, depressive disorder, anxiety, migraines, and CUETO cirrhosis s/p TIPS 11/5/2021     Induction immunosuppression  - Steroid pulse    Maintenance immunosuppression   - Mycophenolate mofetil 750mg BID  - Tacrolimus goal 8-12ng/mL    HAT Ppx  - Aspirin 81mg daily indefinitely    Infection prophylaxis  - PJP: Bactrim indefinitely  - CMV D-/R+: Valganciclovir for 3 months duration    Pharmacy has monitored for medication interactions and immunosuppression levels in conjunction with the multidisciplinary team. In anticipation for discharge, medication therapy needs have been addressed daily throughout the current admission via multidisciplinary rounds and/or discussions, order verification, daily clinical pharmacy review, and communication with prescribers.    Weston Tariq PharmD, BCTXP, BCPS  Inpatient clinical pharmacist

## 2022-11-11 NOTE — PROGRESS NOTES
Addendum: Eval completed 9:15am 11/11/22. Later AM pt w/ change in medical status. Eval content reflects status at time of eval, PT to treat pending medical appropriateness.        11/11/22 0820   Appointment Info   Signing Clinician's Name / Credentials (PT) Bette Landa DPT       Present no   Living Environment   People in Home spouse   Current Living Arrangements house   Home Accessibility stairs to enter home   Number of Stairs, Main Entrance 3   Stair Railings, Main Entrance none  (working on installing)   Transportation Anticipated car, drives self;other (see comments)   Living Environment Comments Pt lives with her spouse in [place].  just had surgery w/ 5# lifting restriction. Daughter is near by and stops in daily for a couple hours. Mother plans on coming down to help. Has 3STE w/o rail but reports will have R HR b y the time she discharges.   Self-Care   Usual Activity Tolerance moderate   Current Activity Tolerance fair   Regular Exercise No   Equipment Currently Used at Home cane, straight;raised toilet seat;shower chair  (removable shower head)   Fall history within last six months no   Activity/Exercise/Self-Care Comment pt IND/mod I w/ SEC at baseline in home and community. Pt has shower chair and raised toilet seat, likely will be installing grab bars. Pt has a flat bed w/o rail.   General Information   Onset of Illness/Injury or Date of Surgery 11/02/22   Referring Physician Dr. Justice   Patient/Family Therapy Goals Statement (PT) to be successful at home   Pertinent History of Current Problem (include personal factors and/or comorbidities that impact the POC) liver tx   Existing Precautions/Restrictions fall;abdominal   Weight-Bearing Status - LUE partial weight-bearing (% in comments)  (<10#)   Weight-Bearing Status - RUE partial weight-bearing (% in comments)  (<10#)   Heart Disease Risk Factors Medical history;Lack of physical activity   Cognition    Affect/Mental Status (Cognition) WNL   Orientation Status (Cognition) oriented x 4   Follows Commands (Cognition) WNL   Cognitive Status Comments no cogntive concerns noted throughout eval. Defer to OT for cognitive assessment   Pain Assessment   Patient Currently in Pain Yes, see Vital Sign flowsheet  (R forearm and abodmin, notified RN about RUE)   Integumentary/Edema   Integumentary/Edema other (describe)   Integumentary/Edema Comments small bruising over BUE ~half dollar size. 3+ pitting edema diffuse throughout BLEs, symmetrical. Skin intact throughout BUE/LE   Posture    Posture Protracted shoulders;Kyphosis;Forward head position   Range of Motion (ROM)   ROM Comment WFL within activity tolerance BUE/LE   Strength (Manual Muscle Testing)   Strength Comments MMT deferred given precautions and abdominal pain. BUE overhead ROM; grossly 4/5 LLE 3+/5 RLE hip flexion, 4/5 jena knee extension/ flex, 5/5 jena DF.   Bed Mobility   Comment, (Bed Mobility) modA to flat bed, no rail   Transfers   Comment, (Transfers) sit<>stand CGA to FWW   Gait/Stairs (Locomotion)   Comment, (Gait/Stairs) SBA w/ FWW   Balance   Balance Comments IND sitting balance, SBA static and dynamic standing balance w/ 1UE support on SEC   Sensory Examination   Sensory Perception Comments reports altered sensation at abdomen near incision site and numbness/ soreness in R quad that has been present since surgery.   Coordination   Coordination no deficits were identified   Muscle Tone   Muscle Tone no deficits were identified   Clinical Impression   Criteria for Skilled Therapeutic Intervention Yes, treatment indicated   PT Diagnosis (PT) impaired functional mobility   Influenced by the following impairments precautions, pain, decreased activity tolerance, functional strength and balance   Functional limitations due to impairments decreased (I) w/ bed mobility, transfers, gait   Clinical Presentation (PT Evaluation Complexity) Stable/Uncomplicated    Clinical Presentation Rationale current status, clinical judgement, home set up   Clinical Decision Making (Complexity) low complexity   Planned Therapy Interventions (PT) balance training;bed mobility training;gait training;stair training;transfer training;progressive activity/exercise;home exercise program;patient/family education;neuromuscular re-education;strengthening;risk factor education;home program guidelines;stretching   Anticipated Equipment Needs at Discharge (PT)   (none, has SEC)   Risk & Benefits of therapy have been explained evaluation/treatment results reviewed;care plan/treatment goals reviewed;risks/benefits reviewed;current/potential barriers reviewed;participants voiced agreement with care plan;participants included;patient   Clinical Impression Comments Pt presents below baseline s/p liver tx; pt w/ decreased functional activity tolerance, LE strength and balance. Pt w/ pain and new precautions which impact functional tolerance. Will benefit from skilled PT to progress fucntional IND and facilitate safe discharge home.   PT Total Evaluation Time   PT Eval, Low Complexity Minutes (43947) 20   Therapy Certification   Start of care date 11/11/22   Certification date from 11/11/22   Certification date to 12/11/22   Medical Diagnosis see above for PMH and dx   Physical Therapy Goals   PT Frequency 6x/week   PT Predicted Duration/Target Date for Goal Attainment 11/16/22   PT Goals Bed Mobility;Transfers;Gait;Stairs;Edema;PT Goal 2;PT Goal 1   PT: Bed Mobility Supervision/stand-by assist;Supine to/from sit;Rolling;Bridging;Within precautions   PT: Transfers Modified independent;Sit to/from stand;Bed to/from chair;Assistive device;Within precautions   PT: Gait Modified independent;Greater than 200 feet;Straight cane   PT: Stairs Supervision/stand-by assist;3 stairs;Rail on left   PT: Edema education to increase ability to manage edema after discharge from the hospital  Patient;Verbalize;[place];Skin care routine;signs/symptoms of intolerance;wear schedule;limb positioning;garment/bandage care;discharge recommendations   PT: Management of edema bandages garment(s);Caregiver;Demonstrate;[place];Patient   PT: Goal 1 Pt understanding of LE strengthening HEP to promote independent progression of functional mobility.   PT: Goal 2 Pt to score a low falls risk on a functional balance measure to promote safety in home environment.   Total Session Time   Total Session Time (sum of timed and untimed services) 20

## 2022-11-11 NOTE — PHARMACY
Pharmacy Stroke Code Response    Pharmacist responded as part of the Stroke Code Team activation to patient care area TCU. The Stroke Team determined that the patient was not a candidate for IV thrombolytic therapy and the pharmacy team was dismissed at 10:47.    Yves Cleray RPH

## 2022-11-11 NOTE — CODE/RAPID RESPONSE
[facility]    Code Stroke  House Officer Note    ////////////////////////////////////////////////////////////////////////////////////////////////////////////////////////////////  Acute stroke evaluation:  Time of arrival: 1035   Time called: 1032   Glucose level 211   Time patient seen by neurology: Pending consult    Time onset of stroke symptoms / witnessed onset: ~2200 last evening    Time last known well: Prior to 2200            Stroke Presentation Type Inhouse Stroke   Stroke Thrombolytic Ineligible Reason Recent Surgery/Lumbar Puncture, Beyond time window   Stroke Thrombolytic Treatments    Neurologists Stroke Team [facility]         National Institutes of Health Stroke Scale  Exam Interval: Baseline   Score    Level of consciousness: (0)   Alert, keenly responsive    LOC questions: (0)   Answers both questions correctly    LOC commands: (0)   Performs both tasks correctly    Best gaze: (0)   Normal    Visual: (0)   No visual loss    Facial palsy: (0)   Normal symmetrical movements    Motor arm (left): (0)   No drift    Motor arm (right): (0)   No drift    Motor leg (left): (0)   No drift    Motor leg (right): (0)   No drift    Limb ataxia: (0)   Absent    Sensory: (0)   Normal- no sensory loss    Best language: (0)   Normal- no aphasia    Dysarthria: (2)   Severe dysarthria    Extinction and inattention: (0)   No abnormality        Total Score:  2       Imaging:  No results found for this or any previous visit (from the past 24 hour(s)).    Physical Exam   Vital Signs with Ranges:  Temp:  [96.5  F (35.8  C)-97.9  F (36.6  C)] 96.5  F (35.8  C)  Pulse:  [65-67] 65  Resp:  [16-20] 18  BP: ()/(64-68) 99/68  SpO2:  [95 %-97 %] 97 %    Assessment & Plan     CT Head and CTA ordered   CMP, CBC, BGM   MRI/MRA per Neurology    Ok to deescalate stroke code.     Discussed with and defer further cares to Neurology and primary team      Interval History     Per chart: Susan is a 52-year-old female  "with a history of nonalcoholic steatohepatitis complicated by cirrhosis s/p TIPS with history of cirrhosis related complications including pleural effusion, ascites, hepatic encephalopathy, hepatic hydrothorax s/p multiple thoracenteses.  Patient also has a history of anemia, insulin-dependent diabetes, GERD hypothyroidism, depression, anxiety, migraines, HSV infection. Patient is s/p Faustino-en-Y gastric bypass.  Patient presented [facility] on 11/1/2022 for orthotopic liver transplant on 11/2/2022 with Dr. Delbert Morgan.  Surgery was largely uncomplicated.  Hospital course complicated by multiple metabolic derangements including hypokalemia, hyponatremia, hypophosphatemia all of which were addressed adequately.  Patient also had acute blood loss anemia but hemoglobin remained stable postop.  There was noted elevated LFTs postop which improved during course of hospital stay.  Patient was discharged to TCU on 11/10/2022 for ongoing medical management and rehabilitation.    Per pt and  interview, they noted dysarthria around 2200 last evening. She denies any other acute complaints. Her  also that recently she was having visual hallucinations and a separate episode dysarthria but only \"for a little bit\" a \"couple of days\" ago.        Data   Recent Labs   Lab 11/11/22  0742 11/10/22  0936 11/09/22  0647   WBC 4.9 4.7 3.6*   HGB 8.7* 10.0* 9.7*   MCV 93 98 98    136* 92*       Time Spent on this Encounter   I spent 75 minutes on the unit/floor managing the care of Susan Puckett. Over 50% of my time was spent counseling the patient and/or coordinating care regarding services listed in this note.          Matt Elizondo, NP      .    "

## 2022-11-11 NOTE — H&P
[facility] Medicine  [address]  [facility] [address]  Phone: [phone]                  Bere Lugo   2017 1:40 PM   Office Visit    Description:  Female : 1967   Provider:  Artur Besnon Jr., MD   Department:  [facility]           Reason for Visit     Hand Pain           Diagnoses this Visit        Comments    Pain of left middle finger    -  Primary            To Do List           Future Appointments        Provider Department Dept Phone    2017 4:15 PM [facility] XROP  [facility] [phone]    2017 5:30 PM [facility] MAMMO1 [facility] [phone]      Goals (5 Years of Data)     None      [facility]     [facility] Care Line -  Assistance  Unless otherwise directed by your provider, please contact [facility], our nurse care line that is available for  assistance.     Registered nurses in the [facility] provide: appointment scheduling, clinical advisement, health education, and other advisory services.  Call: [phone] (toll free)               Medications           Message regarding Medications     Verify the changes and/or additions to your medication regime listed below are the same as discussed with your clinician today.  If any of these changes or additions are incorrect, please notify your healthcare provider.        STOP taking these medications     meloxicam (MOBIC) 15 MG tablet Take 1 tablet (15 mg total) by mouth once daily.           Verify that the below list of medications is an accurate representation of the medications you are currently taking.  If none reported, the list may be blank. If incorrect, please contact your healthcare provider. Carry this list with you in case of emergency.           Current Medications            Clinical Reference Information           Your Vitals Were     BP Pulse Weight Last Period SpO2 BMI    114/70 (BP  [facility]    History and Physical - Hospitalist Service       Date of Admission:  11/10/2022    Assessment & Plan      Susan Puckett is a 52-year-old female with a history of nonalcoholic steatohepatitis complicated by cirrhosis s/p TIPS with history of cirrhosis related complications including pleural effusion, ascites, hepatic encephalopathy, hepatic hydrothorax s/p multiple thoracenteses.  Patient also has a history of anemia, insulin-dependent diabetes, GERD hypothyroidism, depression, anxiety, migraines, HSV infection. Patient is s/p Faustino-en-Y gastric bypass.  Patient presented [facility] on 11/1/2022 for orthotopic liver transplant on 11/2/2022 with Dr. Delbert Morgan.  Surgery was largely uncomplicated.  Hospital course complicated by multiple metabolic derangements including hypokalemia, hyponatremia, hypophosphatemia all of which were addressed adequately.  Patient also had acute blood loss anemia but hemoglobin remained stable postop.  There was noted elevated LFTs postop which improved during course of hospital stay.  Patient was discharged to TCU on 11/10/2022 for ongoing medical management and rehabilitation. Course here in the TCU has been notable for acute onset dysarthria.         #History of decompensated cirrhosis  #S/p orthotopic liver transplant on 11/2/2022 with Dr. Vizcaino  Ultrasound abdomen on 11/4 shows persistence of elevated resistive indicis in main and right hepatic arteries, with slightly improved flow in left hepatic artery.  Portal veins noted to be patent with appropriate directional flow.    Immunosuppression    completed steroid taper on 11/07    Tacrolimus 6 mg BID (goal 8 - 12)    Last tacro level 11/10 => 10.5    Next draw TBD    Mycophenolate 750 mg bid    Infection ppx    Bactrim x6 months    valganciclovir x12 weeks (11/03/22 - 03/03/23)    Anticoagulation with ASA x6 months    Cont to monitor LFTs    #Dysarthria  Onset was evening of 11/10. On  11/11 code stroke called with CT imaging showing no evidence of stroke. Etiology unclear but likely combination of metabolic derangement, increased stress, medication side effect    Neurology consulted, appreciate recs    F/up MRI brain w/wo IV contrast for further evaluation (okay for ativan prior to MRI given hx of claustrophobia)     Changing oxycodone to po dilaudid for pain control     SLP consult    Correct electrolyte abnormalities as below    #Postop pain    PO dilaudid 2mg Q6h prn Robaxin 500mg QID, Atarax PRN. add lidocaine patches.    #Microcytic anemia, chronic - baseline hgb ~9.0  #Thrombocytopenia, chronic - likely due to hx of cirrhosis   -no evidence of bleed at this time    Cont to monitor    #GEORGETTE-resolved  -likely pre-renal  -ctm    #Hyponatremia-improving  #Hypokalemia-resolved  #Hypocalcemia-improving  #Hypophosphatemia-improving    Replete above electrolytes and continue to monitor    Cont ca++ supplementation     #Hypoalbuminemia-improving    Nutrition consult    Cont to monitor    #History of Faustino-en-Y gastric bypass  #Pancreatic insufficiency    Dietary consult    Creon [id number] with meals and snacks      #Insulin-dependent diabetes  A1c 5.7 on 11/02    Continue Lantus 12 units every morning    NovoLog high insulin resistance with meals    #Hypothyroidism    PTA levothyroxine 150 mcg    #Depression, #anxiety    Continue PTA Lexapro 10 mg daily    Trazodone nightly 50 mg.    #History of migraine    Cont PTA topiramate 50 mg daily    #History of seizure  First at the age of 15 after staying up all night while volunteering; second was following hypoglycemic episode.       Immunization:   Immunization History   Administered Date(s) Administered     COVID-19,PF,Pfizer (12+ Yrs) 03/11/2021, 04/03/2021, 09/07/2021     FLU 6-35 months 10/15/2019     HepB-Adult 10/24/2006, 12/04/2006, 04/27/2007     Influenza Vaccine IM > 6 months Valent IIV4 (Alfuria,Fluzone) 12/13/2018, 10/15/2019, 10/05/2020,  Location: Left arm, Patient Position: Sitting, BP Method: Manual) 80 67.4 kg (148 lb 8 oz) 04/29/2017 (Approximate) 97% 23.26 kg/m2      Blood Pressure          Most Recent Value    BP  114/70      Allergies as of 5/1/2017     Pcn [Penicillins]      Immunizations Administered on Date of Encounter - 5/1/2017     None      Orders Placed During Today's Visit     Future Labs/Procedures Expected by Expires    X-Ray Hand Complete Left  5/1/2017 5/1/2018      [facility] Sign-Up     Activating your [facility] account is as easy as 1-2-3!     1) Visit [email or web address], select Sign Up Now, enter this activation code and your date of birth, then select Next.  [email or web address]  Expires: 5/12/2017 11:18 PM      2) Create a username and password to use when you visit [facility] in the future and select a security question in case you lose your password and select Next.    3) Enter your e-mail address and click Sign Up!    Additional Information  If you have questions, please e-mail [email or web address] or call [phone] to talk to our [facility] staff. Remember, [facility] is NOT to be used for urgent needs. For medical emergencies, dial 911.         Language Assistance Services     ATTENTION: Language assistance services are available, free of charge. Please call [phone].      ATENCIÓN: Si habla español, tiene a seay disposición servicios gratuitos de asistencia lingüística. Llame al [phone].     CHÚ Ý: N?u b?n nói Ti?ng Vi?t, có các d?ch v? h? tr? ngôn ng? mi?n phí dành cho b?n. G?i s? [phone].         [facility] complies with applicable Federal civil rights laws and does not discriminate on the basis of race, color, national origin, age, disability, or sex.         "09/28/2021     Pneumococcal 23 valent 11/22/2019     TD (ADULT, 7+) 09/20/2002     TDAP Vaccine (Boostrix) 10/03/2012     Twinrix A/B 10/31/2019, 10/31/2019, 08/11/2020, 08/11/2020, 02/04/2021     Indication for psychotropic medications:   - Escitalopram 10 mg for depression  - Gabapentin 200 mg twice daily for neuropathic pain  - Topiramate 50 mg daily for migraine prophylaxis  - Trazodone 50 mg nightly, for insomnia, depression  - Atarax as needed for pain, anxiety  - Dilaudid p.o. for acute postop pain  - Ativan 4 mg x 1 as needed 30 minutes prior to MRI.  Is/Os: No intake or output data in the 24 hours ending 11/11/22 1436  Length of stay: 1     Diet: Regular Diet Adult    DVT Prophylaxis: Pneumatic Compression Devices and Ambulate every shift  Rossi Catheter: Not present  Central Lines: None  Cardiac Monitoring: None  Code Status: Full Code      Clinically Significant Risk Factors Present on Admission         # Hyponatremia: Lowest Na = 133 mmol/L (Ref range: 136-145) in last 2 days, will monitor as appropriate      # Hypoalbuminemia: Lowest albumin = 2.9 g/dL (Ref range: 3.5-5.2) at 11/10/2022  9:36 AM, will monitor as appropriate         # Obesity: Estimated body mass index is 32.35 kg/m  as calculated from the following:    Height as of 11/2/22: 1.676 m (5' 6\").    Weight as of this encounter: 90.9 kg (200 lb 6.4 oz).           Disposition Plan      Expected Discharge Date: 11/16/2022      Destination: home with family (Spouse)          The patient's care was discussed with the Patient, Patient's Family and Care Team.    ERIKA MARTÍNEZ MD  Hospitalist Service  [facility]  Securely message with the Vocera Web Console (learn more here)  Text page via [employer] Paging/Directory         ______________________________________________________________________    Chief Complaint   Weakness    History is obtained from the patient  Chart review    History of Present Illness   Susan Puckett is a " 52-year-old female with a history of nonalcoholic steatohepatitis complicated by cirrhosis s/p TIPS with history of cirrhosis related complications including pleural effusion, ascites, hepatic encephalopathy, hepatic hydrothorax s/p multiple thoracenteses.  Patient also has a history of anemia, insulin-dependent diabetes, GERD hypothyroidism, depression, anxiety, migraines, HSV infection. Patient is s/p Faustino-en-Y gastric bypass.  Patient presented [facility] on 11/1/2022 for orthotopic liver transplant on 11/2/2022 with Dr. Delbert oMrgan.  Surgery was largely uncomplicated.  Hospital course complicated by multiple metabolic derangements including hypokalemia, hyponatremia, hypophosphatemia all of which were addressed adequately.  Patient also had acute blood loss anemia but hemoglobin remained stable postop.  There was noted elevated LFTs postop which improved during course of hospital stay.  Patient was discharged to TCU on 11/10/2022 for ongoing medical management and rehabilitation.  On 11/11, patient was noted to be stuttering with her words which is new for patient.  She denied any new weakness, pain, numbness or tingling.  There was no change in her vision.  Per patient's , he had noticed wife was stuttering about 9 pm last night. No known history of this in the past.     Review of Systems    Complete ROS was obtained with the patient at bedside with pertinent positives and negatives as detailed in the HPI. All other systems reviewed and otherwise negative.       Past Medical History    I have reviewed this patient's medical history and updated it with pertinent information if needed.   Past Medical History:   Diagnosis Date     Anemia      Anxiety      Cold sore      Depressive disorder      Diabetes (H)     Type 2 DM/No Insulin      Encephalopathy      Esophageal varices without bleeding (H)     grade I     History of blood transfusion     10/2019     History of seizure     diabetic seizure      Insomnia      Liver cirrhosis secondary to CUETO (H) 2020     Migraine      Pleural effusion      Thrombocytopenia (H)      Thyroid disease        Past Surgical History   I have reviewed this patient's surgical history and updated it with pertinent information if needed.  Past Surgical History:   Procedure Laterality Date     APPENDECTOMY           BENCH LIVER N/A 2022    Procedure: Bench liver;  Surgeon: Delbert Morgan MD;  Location: [facility] OR     COLONOSCOPY      2020 at [facility]      ENT SURGERY      tempanoplasty     GI SURGERY      EGD 2020 at [facility]      GYN SURGERY      laparoscopic ablation     IR TRANSVEN INTRAHEPATIC PORTOSYST SHUNT  2021     MAMMOPLASTY REDUCTION BILATERAL       [place] STAB PHELBECTOMY VARICOSE VEINS, LESS THAN 10 INCISIONS, ONE EXTREMITY       RNY gastric bypass  2006    retoocolic, retrogastric, [facility]     TONSILLECTOMY & ADENOIDECTOMY Bilateral      TRANSPLANT LIVER RECIPIENT  DONOR N/A 2022    Procedure: TRANSPLANT, LIVER, RECIPIENT,  DONOR;  Surgeon: Delbert Morgan MD;  Location: [facility] OR     WISDOM TEETH EXTRACTION         Social History   I have reviewed this patient's social history and updated it with pertinent information if needed.  Social History     Tobacco Use     Smoking status: Never     Smokeless tobacco: Never   Substance Use Topics     Alcohol use: Not Currently     Comment: Last drink was in 2017     Drug use: Never       Family History   I have reviewed this patient's family history and updated it with pertinent information if needed.  Family History   Problem Relation Age of Onset     Anesthesia Reaction Nephew         PONV     Bleeding Disorder No family hx of      Clotting Disorder No family hx of        Prior to Admission Medications   Prior to Admission Medications   Prescriptions Last Dose Informant Patient Reported? Taking?   Continuous Blood Gluc  (DEXCOM G6 ) LUNA   Spouse/Significant Other Yes No   Sig: Use as directed for continuous glucose monitoring.   Continuous Blood Gluc Sensor (DEXCOM G6 SENSOR) MISC  Spouse/Significant Other Yes No   Sig: Use as directed for continuous glucose monitoring.  Change sensor every 10 days.   Continuous Blood Gluc Transmit (DEXCOM G6 TRANSMITTER) MISC  Spouse/Significant Other Yes No   Sig: Use as directed for continuous glucose monitoring.  Change every 3 months.   Lidocaine (LIDOCARE) 4 % Patch   No No   Sig: Place 2-3 patches onto the skin every 24 hours To prevent lidocaine toxicity, patient should be patch free for 12 hrs daily.   amylase-lipase-protease (CREON 24) 48684-07405 units CPEP per EC capsule   No No   Sig: Take 2 capsules by mouth 3 times daily (with meals)   amylase-lipase-protease (CREON 24) 21890-17655 units CPEP per EC capsule   No No   Sig: Take 1 capsule by mouth Take with snacks or supplements (with snacks)   aspirin (ASA) 81 MG chewable tablet   No No   Sig: Take 1 tablet (81 mg) by mouth daily   calcium carbonate-vitamin D (OSCAL W/D) 500-200 MG-UNIT tablet  Spouse/Significant Other No No   Sig: Take 1 tablet by mouth 2 times daily (with meals) Take one tab once in AM and once in PM with meals   cyanocobalamin (VITAMIN B-12) 1000 MCG tablet  Spouse/Significant Other Yes No   Sig: Take 1,000 mcg by mouth every 7 days Take 1 tablet by mouth every 7 days on Wednesdays   escitalopram (LEXAPRO) 10 MG tablet  Spouse/Significant Other No No   Sig: Take 1 tablet (10 mg) by mouth daily   folic acid (FOLVITE) 1 MG tablet  Spouse/Significant Other Yes No   Sig: Take 1 mg by mouth every morning   gabapentin (NEURONTIN) 100 MG capsule   No No   Sig: Take 2 capsules (200 mg) by mouth daily   hydrOXYzine (ATARAX) 25 MG tablet   No No   Sig: Take 1-2 tablets (25-50 mg) by mouth every 6 hours as needed for other (adjuvant pain)   insulin aspart (NOVOLOG PEN) 100 UNIT/ML pen   No No   Sig: Inject 1-10 Units Subcutaneous 3 times daily  (before meals)   insulin aspart (NOVOLOG PEN) 100 UNIT/ML pen   No No   Sig: Inject 1-7 Units Subcutaneous At Bedtime   insulin aspart (NOVOLOG PEN) 100 UNIT/ML pen   No No   Sig: Inject 1-5 Units Subcutaneous 3 times daily (with meals)   insulin aspart (NOVOLOG PEN) 100 UNIT/ML pen   No No   Sig: Inject 1-5 Units Subcutaneous Take with snacks or supplements for high blood sugar   insulin glargine (LANTUS PEN) 100 UNIT/ML pen   No No   Sig: Inject 12 Units Subcutaneous every morning (before breakfast)   insulin pen needle (BD GRISEL U/F) 32G X 4 MM miscellaneous  Spouse/Significant Other Yes No   Sig: Inject 1 each Subcutaneous   levothyroxine (SYNTHROID/LEVOTHROID) 150 MCG tablet  Spouse/Significant Other Yes No   Sig: Take 1 tablet by mouth daily   magnesium oxide (MAG-OX) 400 MG tablet   No No   Sig: Take 1 tablet (400 mg) by mouth 2 times daily   melatonin 5 MG tablet  Spouse/Significant Other Yes No   Sig: Take 5 mg by mouth At Bedtime   methocarbamol (ROBAXIN) 500 MG tablet   No No   Sig: Take 1 tablet (500 mg) by mouth 4 times daily for 2 days   multivitamin CF FORMULA (DEKAS PLUS) capsule  Spouse/Significant Other No No   Sig: Take 1 capsule by mouth daily   mycophenolate (GENERIC EQUIVALENT) 250 MG capsule   No No   Sig: Take 3 capsules (750 mg) by mouth 2 times daily   ondansetron (ZOFRAN ODT) 8 MG ODT tab  Spouse/Significant Other Yes No   Sig: Take 1 tablet by mouth every 8 hours as needed for nausea   oxyCODONE (ROXICODONE) 5 MG tablet   No No   Sig: Take 1 tablet (5 mg) by mouth every 6 hours as needed for moderate to severe pain   pantoprazole (PROTONIX) 40 MG EC tablet   No No   Sig: Take 1 tablet (40 mg) by mouth daily   polyethylene glycol (MIRALAX) 17 GM/Dose powder   No No   Sig: Take 17 g by mouth daily   potassium phosphate, monobasic, (K-PHOS) 500 MG tablet   No No   Sig: Take 1 tablet (500 mg) by mouth 2 times daily for 2 days   sennosides (SENOKOT) 8.6 MG tablet   No No   Si tablets by  "Oral or Feeding Tube route 2 times daily   sulfamethoxazole-trimethoprim (BACTRIM) 400-80 MG tablet   No No   Si tablet by Oral or Feeding Tube route daily   tacrolimus (GENERIC EQUIVALENT) 1 MG capsule   No No   Sig: Take 6 capsules (6 mg) by mouth 2 times daily   topiramate (TOPAMAX) 50 MG tablet  Spouse/Significant Other Yes No   Sig: Take 50 mg by mouth daily    traZODone (DESYREL) 100 MG tablet  Spouse/Significant Other Yes No   Sig: Take 0.5 tablets (50 mg) by mouth At Bedtime   valGANciclovir (VALCYTE) 450 MG tablet   No No   Sig: Take 1 tablet (450 mg) by mouth daily      Facility-Administered Medications: None     Allergies   Allergies   Allergen Reactions     Methylprednisolone Hives     Per patient, reaction occurred in  or earlier. Broke out in round, flat hives in neck and chest area. No shortness of breath or swelling. Unknown if reaction occurred immediately after administration.      Droperidol Anxiety     Nsaids Other (See Comments)     GI bleed.     Prednisone Anxiety, Hives and Rash       Physical Exam   Vital Signs: Temp: 97.9  F (36.6  C) Temp src: Oral BP: 112/64 Pulse: 67   Resp: 20 SpO2: 95 % O2 Device: None (Room air)    Weight: 200 lbs 6.4 oz        General appearance: Alert, in no acute distress and Ox3   HEENT: Normocephalic, atraumatic; Pupils are equal, round and react to light; Extraocular movements intact; Conjuctivae are Normal; Mucous membranes moist and without lesions   Pulm: clear to ausculation bilaterally, no wheeze, rales or rhonchi   CVS: RRR, no m/r/g   GI: Abdomen soft, tender to palpation   Extremities: No cyanosis, clubbing or edema   Neurologic:   - Mental Status: Alert, relaxed, and cooperative. Thought process coherent. Oriented to   person, place, and time.   -  Speech: dysarthric   - Cranial Nerves: Il through Xll largely intact: patient unable to properly pronouce \"ka-ka-ka; ga-ga-ga; la-la-la\"   - Motor: Good muscle bulk and tone.   - Strength 4/5 symmetric " throughout.   - Cerebellar-- finger-to-nose intact.   - Romberg--maintains balance with eyes closed.   - No pronator drift.   - Sensory:light touch intact.     Data   Data reviewed today: I reviewed all medications, new labs and imaging results over the last 24 hours.    Recent Labs   Lab 11/11/22  1242 11/11/22  1055 11/11/22  1036 11/11/22  0813 11/11/22  0742 11/10/22  1309 11/10/22  0936 11/09/22  0935 11/09/22  0647   WBC  --   --   --   --  4.9  --  4.7  --  3.6*   HGB  --   --   --   --  8.7*  --  10.0*  --  9.7*   MCV  --   --   --   --  93  --  98  --  98   PLT  --   --   --   --  160  --  136*  --  92*   NA  --  133  --   --   --   --  133*  --  130*   POTASSIUM  --  4.6  --   --   --   --  4.3  --  3.7   CHLORIDE  --  103  --   --   --   --  100  --  97*   CO2  --  23  --   --   --   --  25  --  22   BUN  --  17  --   --   --   --  16.6  --  14.4   CR  --  0.98  --   --   --   --  0.99*  --  0.95   ANIONGAP  --  7  --   --   --   --  8  --  11   MAURI  --  7.3*  --   --   --   --  7.6*  --  7.1*   * 213* 211*   < >  --    < > 177*   < > 189*   ALBUMIN  --  2.6*  --   --   --   --  2.9*  --  2.7*   PROTTOTAL  --  5.2*  --   --   --   --  5.3*  --  4.8*   BILITOTAL  --  0.7  --   --   --   --  0.7  --  0.9   ALKPHOS  --  242*  --   --   --   --  256*  --  224*   ALT  --  92*  --   --   --   --  108*  --  134*   AST  --  28  --   --   --   --  39*  --  66*    < > = values in this interval not displayed.     Most Recent 3 CBC's:Recent Labs   Lab Test 11/11/22  0742 11/10/22  0936 11/09/22  0647   WBC 4.9 4.7 3.6*   HGB 8.7* 10.0* 9.7*   MCV 93 98 98    136* 92*     Most Recent 3 BMP's:Recent Labs   Lab Test 11/11/22  1242 11/11/22  1055 11/11/22  1036 11/10/22  1309 11/10/22  0936 11/09/22  0935 11/09/22  0647   NA  --  133  --   --  133*  --  130*   POTASSIUM  --  4.6  --   --  4.3  --  3.7   CHLORIDE  --  103  --   --  100  --  97*   CO2  --  23  --   --  25  --  22   BUN  --  17  --   --  16.6   --  14.4   CR  --  0.98  --   --  0.99*  --  0.95   ANIONGAP  --  7  --   --  8  --  11   MAURI  --  7.3*  --   --  7.6*  --  7.1*   * 213* 211*   < > 177*   < > 189*    < > = values in this interval not displayed.     Most Recent 2 LFT's:Recent Labs   Lab Test 11/11/22  1055 11/10/22  0936   AST 28 39*   ALT 92* 108*   ALKPHOS 242* 256*   BILITOTAL 0.7 0.7     Most Recent 3 INR's:Recent Labs   Lab Test 11/04/22  0607 11/03/22  0850 11/03/22  0358   INR 1.32* 1.33* 1.43*     Recent Results (from the past 24 hour(s))   CT Head w/o Contrast    Narrative    CT HEAD W/O CONTRAST 11/11/2022 11:13 AM    History: stroke code facial droop and aphasia     Comparison: 7/9/2022    Technique: Using multidetector thin collimation helical acquisition  technique, axial, coronal and sagittal CT images from the skull base  to the vertex were obtained without intravenous contrast.   (topogram) image(s) also obtained and reviewed.    Findings: There is no intracranial hemorrhage, mass effect, or midline  shift. Gray/white matter differentiation in both cerebral hemispheres  is preserved. Ventricles are proportionate to the cerebral sulci. The  basal cisterns are clear.    The bony calvaria and the bones of the skull base are normal. The  visualized portions of the paranasal sinuses and mastoid air cells are  clear.      Impression    Impression:  No acute intracranial pathology.    I have personally reviewed the examination and initial interpretation  and I agree with the findings.    DEBORAH LUCERO MD         SYSTEM ID:  [id number]   CTA Head Neck w Contrast    Narrative    CTA HEAD NECK W CONTRAST 11/11/2022 11:14 AM    CT angiogram of the neck   CT angiogram of the base of the brain with contrast  Reconstruction on the 3D workstation    Provided History:  stroke code    Comparison:  7/9/2022 CT.      Technique:  HEAD and NECK CTA: During rapid bolus intravenous injection of  nonionic contrast material, axial images were  obtained using thin  collimation multidetector helical technique from the base of the upper  aortic arch through the [place] of [place]. This CT angiogram data was  reconstructed at thin intervals with mild overlap. Images were sent to  the 3D workstation, and 3D reconstructions were obtained. The axial  source images, multiplanar reformations, 3D reconstructions in both  maximum intensity projection display and volume rendered models were  reviewed, with reconstructions performed by the technologist.    Contrast: 75mL Isovue 370    Findings:    Head CTA demonstrates no aneurysm or stenosis of the major  intracranial arteries. Functional fetal origin of the right posterior  cerebral artery. Communicating arteries are patent.    Neck CTA demonstrates no stenosis of the major cervical arteries. The  origins of the great vessels from the aortic arch are patent. The  normal distal right internal carotid artery measures 5 mm. The normal  distal left internal carotid artery measures 5 mm.     No mass within the visualized portions of the cervical soft tissues or  lung apices.       Impression    Impression:    1. Head CTA demonstrates no aneurysm or stenosis of the major  intracranial arteries.   2. Neck CTA demonstrates no stenosis of the major cervical arteries.    I have personally reviewed the examination and initial interpretation  and I agree with the findings.    DEBORAH LUCERO MD         SYSTEM ID:  [id number]

## 2022-11-11 NOTE — PLAN OF CARE
The patient is alert and oriented. VSS with intermittent forgetfulness. The writer and OT staff noted that the patient was stuttering and patient confirmed that this was new from yesterday but does not remember if it started prior to discharge to TCU or after being admitted. Patient declined weakness on any extremity. The provider was notified and went to see the patient. The stroke code was called after further evaluation by the provider.

## 2022-11-11 NOTE — CONSULTS
"      [facility]    Stroke Telephone Note    I was called by Amor Justice on 11/11/22 regarding patient Susan Puckett. The patient is a 52 year old female who recently had a liver transplant on 11/2 who is in rehab who was noted to have some left facial droop and dysarthria which may have been present since the afternoon yesterday. Her NIHSS is 4. She has no focal extremity deficits.     Stroke Code Data (for stroke code without tele)  Stroke code activated 11/11/22   1032   Stroke provider first response  11/11/22   1032            Last known normal 11/10/22        Unknown   Time of discovery   (or onset of symptoms) 11/11/22    (unkj)   Head CT read by Stroke Neuro Dr/Provider 11/11/22   1118   Was stroke code de-escalated? Yes 11/11/22 1132          Imaging Findings   CT/CTA unremarkable. Personally reviewed    Intravenous Thrombolysis  Not given due to:   - surgery/trauma within the past 14 days  - unclear or unfavorable risk-benefit profile for extended window thrombolysis beyond the conventional 4.5 hour time window    Endovascular Treatment  Not initiated due to absence of proximal vessel occlusion    Impression  Difficulty with speech and L facial droop possibly due to acute stroke    Recommendations   - Check brain MRI with and without contrast when able, ideally today. Contact us using link below if stroke seen    Discussed with my attending Dr. Antonio    My recommendations are based on the information provided over the phone by Susan Puckett's in-person providers. They are not intended to replace the clinical judgment of her in-person providers. I was not requested to personally see or examine the patient at this time.    Paige Rosales PA-C  Vascular Neurology    To page me or covering stroke neurology team member, click here: [facility]COM   Choose \"On Call\" tab at top, then search dropdown box for \"Neurology Adult\", select location, press Enter, then look for stroke/neuro " ICU/telestroke.

## 2022-11-12 ENCOUNTER — APPOINTMENT (OUTPATIENT)
Dept: SPEECH THERAPY | Facility: SKILLED NURSING FACILITY | Age: 52
DRG: 949 | End: 2022-11-12
Attending: INTERNAL MEDICINE
Payer: COMMERCIAL

## 2022-11-12 ENCOUNTER — APPOINTMENT (OUTPATIENT)
Dept: PHYSICAL THERAPY | Facility: SKILLED NURSING FACILITY | Age: 52
DRG: 949 | End: 2022-11-12
Attending: INTERNAL MEDICINE
Payer: COMMERCIAL

## 2022-11-12 LAB
ALBUMIN SERPL-MCNC: 2.2 G/DL (ref 3.4–5)
ALP SERPL-CCNC: 186 U/L (ref 40–150)
ALT SERPL W P-5'-P-CCNC: 64 U/L (ref 0–50)
ANION GAP SERPL CALCULATED.3IONS-SCNC: 7 MMOL/L (ref 3–14)
AST SERPL W P-5'-P-CCNC: 17 U/L (ref 0–45)
BASOPHILS # BLD AUTO: 0 10E3/UL (ref 0–0.2)
BASOPHILS NFR BLD AUTO: 0 %
BILIRUB SERPL-MCNC: 0.5 MG/DL (ref 0.2–1.3)
BUN SERPL-MCNC: 21 MG/DL (ref 7–30)
CALCIUM SERPL-MCNC: 7.1 MG/DL (ref 8.5–10.1)
CHLORIDE BLD-SCNC: 103 MMOL/L (ref 94–109)
CO2 SERPL-SCNC: 25 MMOL/L (ref 20–32)
CREAT SERPL-MCNC: 1.07 MG/DL (ref 0.52–1.04)
EOSINOPHIL # BLD AUTO: 0.2 10E3/UL (ref 0–0.7)
EOSINOPHIL NFR BLD AUTO: 5 %
ERYTHROCYTE [DISTWIDTH] IN BLOOD BY AUTOMATED COUNT: 15.3 % (ref 10–15)
GFR SERPL CREATININE-BSD FRML MDRD: 63 ML/MIN/1.73M2
GLUCOSE BLD-MCNC: 306 MG/DL (ref 70–99)
GLUCOSE BLDC GLUCOMTR-MCNC: 133 MG/DL (ref 70–99)
GLUCOSE BLDC GLUCOMTR-MCNC: 134 MG/DL (ref 70–99)
GLUCOSE BLDC GLUCOMTR-MCNC: 192 MG/DL (ref 70–99)
GLUCOSE BLDC GLUCOMTR-MCNC: 211 MG/DL (ref 70–99)
GLUCOSE BLDC GLUCOMTR-MCNC: 315 MG/DL (ref 70–99)
GLUCOSE BLDC GLUCOMTR-MCNC: 329 MG/DL (ref 70–99)
HCT VFR BLD AUTO: 24.4 % (ref 35–47)
HGB BLD-MCNC: 8.2 G/DL (ref 11.7–15.7)
IMM GRANULOCYTES # BLD: 0 10E3/UL
IMM GRANULOCYTES NFR BLD: 0 %
LYMPHOCYTES # BLD AUTO: 0.7 10E3/UL (ref 0.8–5.3)
LYMPHOCYTES NFR BLD AUTO: 16 %
MCH RBC QN AUTO: 31.8 PG (ref 26.5–33)
MCHC RBC AUTO-ENTMCNC: 33.6 G/DL (ref 31.5–36.5)
MCV RBC AUTO: 95 FL (ref 78–100)
MONOCYTES # BLD AUTO: 0.3 10E3/UL (ref 0–1.3)
MONOCYTES NFR BLD AUTO: 7 %
NEUTROPHILS # BLD AUTO: 3.4 10E3/UL (ref 1.6–8.3)
NEUTROPHILS NFR BLD AUTO: 72 %
NRBC # BLD AUTO: 0 10E3/UL
NRBC BLD AUTO-RTO: 0 /100
PLATELET # BLD AUTO: 185 10E3/UL (ref 150–450)
POTASSIUM BLD-SCNC: 4.5 MMOL/L (ref 3.4–5.3)
PROT SERPL-MCNC: 4.8 G/DL (ref 6.8–8.8)
RBC # BLD AUTO: 2.58 10E6/UL (ref 3.8–5.2)
SODIUM SERPL-SCNC: 135 MMOL/L (ref 133–144)
TACROLIMUS BLD-MCNC: 10.1 UG/L (ref 5–15)
TME LAST DOSE: NORMAL H
TME LAST DOSE: NORMAL H
WBC # BLD AUTO: 4.8 10E3/UL (ref 4–11)

## 2022-11-12 PROCEDURE — 250N000013 HC RX MED GY IP 250 OP 250 PS 637: Performed by: INTERNAL MEDICINE

## 2022-11-12 PROCEDURE — 80053 COMPREHEN METABOLIC PANEL: CPT | Performed by: INTERNAL MEDICINE

## 2022-11-12 PROCEDURE — 99304 1ST NF CARE SF/LOW MDM 25: CPT | Performed by: STUDENT IN AN ORGANIZED HEALTH CARE EDUCATION/TRAINING PROGRAM

## 2022-11-12 PROCEDURE — 83519 RIA NONANTIBODY: CPT | Performed by: INTERNAL MEDICINE

## 2022-11-12 PROCEDURE — 97530 THERAPEUTIC ACTIVITIES: CPT | Mod: GP | Performed by: REHABILITATION PRACTITIONER

## 2022-11-12 PROCEDURE — 83516 IMMUNOASSAY NONANTIBODY: CPT | Performed by: INTERNAL MEDICINE

## 2022-11-12 PROCEDURE — 80197 ASSAY OF TACROLIMUS: CPT | Performed by: NURSE PRACTITIONER

## 2022-11-12 PROCEDURE — 36415 COLL VENOUS BLD VENIPUNCTURE: CPT | Performed by: INTERNAL MEDICINE

## 2022-11-12 PROCEDURE — 86255 FLUORESCENT ANTIBODY SCREEN: CPT | Performed by: INTERNAL MEDICINE

## 2022-11-12 PROCEDURE — 250N000012 HC RX MED GY IP 250 OP 636 PS 637: Performed by: INTERNAL MEDICINE

## 2022-11-12 PROCEDURE — 120N000009 HC R&B SNF

## 2022-11-12 PROCEDURE — 96125 COGNITIVE TEST BY HC PRO: CPT | Mod: GN

## 2022-11-12 PROCEDURE — 250N000009 HC RX 250: Performed by: INTERNAL MEDICINE

## 2022-11-12 PROCEDURE — 85014 HEMATOCRIT: CPT | Performed by: INTERNAL MEDICINE

## 2022-11-12 RX ADMIN — POTASSIUM PHOSPHATE, MONOBASIC 500 MG: 500 TABLET, SOLUBLE ORAL at 08:12

## 2022-11-12 RX ADMIN — PANCRELIPASE 2 CAPSULE: 24000; 76000; 120000 CAPSULE, DELAYED RELEASE PELLETS ORAL at 17:24

## 2022-11-12 RX ADMIN — METHOCARBAMOL 500 MG: 500 TABLET ORAL at 20:24

## 2022-11-12 RX ADMIN — LIDOCAINE PATCH 4% 2 PATCH: 40 PATCH TOPICAL at 07:55

## 2022-11-12 RX ADMIN — TRAZODONE HYDROCHLORIDE 50 MG: 50 TABLET ORAL at 00:22

## 2022-11-12 RX ADMIN — NYSTATIN 500000 UNITS: 100000 SUSPENSION ORAL at 16:55

## 2022-11-12 RX ADMIN — PANCRELIPASE 2 CAPSULE: 24000; 76000; 120000 CAPSULE, DELAYED RELEASE PELLETS ORAL at 07:54

## 2022-11-12 RX ADMIN — ASPIRIN 81 MG CHEWABLE TABLET 81 MG: 81 TABLET CHEWABLE at 07:52

## 2022-11-12 RX ADMIN — PANCRELIPASE 2 CAPSULE: 24000; 76000; 120000 CAPSULE, DELAYED RELEASE PELLETS ORAL at 12:22

## 2022-11-12 RX ADMIN — SULFAMETHOXAZOLE AND TRIMETHOPRIM 1 TABLET: 400; 80 TABLET ORAL at 07:52

## 2022-11-12 RX ADMIN — NYSTATIN 500000 UNITS: 100000 SUSPENSION ORAL at 20:24

## 2022-11-12 RX ADMIN — POTASSIUM PHOSPHATE, MONOBASIC 500 MG: 500 TABLET, SOLUBLE ORAL at 20:24

## 2022-11-12 RX ADMIN — GABAPENTIN 200 MG: 100 CAPSULE ORAL at 20:24

## 2022-11-12 RX ADMIN — MYCOPHENOLATE MOFETIL 750 MG: 250 CAPSULE ORAL at 07:52

## 2022-11-12 RX ADMIN — Medication 500 MG: at 17:24

## 2022-11-12 RX ADMIN — Medication 1 CAPSULE: at 12:23

## 2022-11-12 RX ADMIN — MYCOPHENOLATE MOFETIL 750 MG: 250 CAPSULE ORAL at 17:24

## 2022-11-12 RX ADMIN — Medication 5 UNITS: at 12:08

## 2022-11-12 RX ADMIN — Medication 500 MG: at 07:52

## 2022-11-12 RX ADMIN — LEVOTHYROXINE SODIUM 150 MCG: 75 TABLET ORAL at 06:45

## 2022-11-12 RX ADMIN — SENNOSIDES AND DOCUSATE SODIUM 2 TABLET: 50; 8.6 TABLET ORAL at 20:24

## 2022-11-12 RX ADMIN — VALGANCICLOVIR HYDROCHLORIDE 450 MG: 450 TABLET ORAL at 08:17

## 2022-11-12 RX ADMIN — TACROLIMUS 6 MG: 5 CAPSULE ORAL at 07:52

## 2022-11-12 RX ADMIN — METHOCARBAMOL 500 MG: 500 TABLET ORAL at 12:23

## 2022-11-12 RX ADMIN — NYSTATIN 500000 UNITS: 100000 SUSPENSION ORAL at 08:12

## 2022-11-12 RX ADMIN — ESCITALOPRAM OXALATE 10 MG: 10 TABLET ORAL at 07:53

## 2022-11-12 RX ADMIN — NYSTATIN 500000 UNITS: 100000 SUSPENSION ORAL at 12:22

## 2022-11-12 RX ADMIN — TACROLIMUS 6 MG: 5 CAPSULE ORAL at 17:24

## 2022-11-12 RX ADMIN — TOPIRAMATE 50 MG: 50 TABLET ORAL at 07:52

## 2022-11-12 RX ADMIN — INSULIN ASPART 2 UNITS: 100 INJECTION, SOLUTION INTRAVENOUS; SUBCUTANEOUS at 17:26

## 2022-11-12 RX ADMIN — HYDROMORPHONE HYDROCHLORIDE 2 MG: 2 TABLET ORAL at 04:47

## 2022-11-12 RX ADMIN — INSULIN ASPART 3 UNITS: 100 INJECTION, SOLUTION INTRAVENOUS; SUBCUTANEOUS at 12:21

## 2022-11-12 RX ADMIN — MAGNESIUM OXIDE TAB 400 MG (241.3 MG ELEMENTAL MG) 400 MG: 400 (241.3 MG) TAB at 07:52

## 2022-11-12 RX ADMIN — METHOCARBAMOL 500 MG: 500 TABLET ORAL at 16:55

## 2022-11-12 RX ADMIN — HYDROMORPHONE HYDROCHLORIDE 2 MG: 2 TABLET ORAL at 17:02

## 2022-11-12 RX ADMIN — INSULIN GLARGINE 12 UNITS: 100 INJECTION, SOLUTION SUBCUTANEOUS at 07:53

## 2022-11-12 RX ADMIN — TRAZODONE HYDROCHLORIDE 50 MG: 50 TABLET ORAL at 23:44

## 2022-11-12 RX ADMIN — METHOCARBAMOL 500 MG: 500 TABLET ORAL at 08:12

## 2022-11-12 RX ADMIN — POLYETHYLENE GLYCOL 3350 17 G: 17 POWDER, FOR SOLUTION ORAL at 07:50

## 2022-11-12 RX ADMIN — SENNOSIDES AND DOCUSATE SODIUM 2 TABLET: 50; 8.6 TABLET ORAL at 08:12

## 2022-11-12 RX ADMIN — PANTOPRAZOLE SODIUM 40 MG: 40 TABLET, DELAYED RELEASE ORAL at 06:45

## 2022-11-12 RX ADMIN — GABAPENTIN 200 MG: 100 CAPSULE ORAL at 08:12

## 2022-11-12 ASSESSMENT — ACTIVITIES OF DAILY LIVING (ADL)
ADLS_ACUITY_SCORE: 31

## 2022-11-12 NOTE — PHARMACY-ADMISSION MEDICATION HISTORY
Please see Admission Medication History completed on 11/1/22 by Laura Boyd RPH under previous encounter at [facility] for information regarding prior to admission medications.

## 2022-11-12 NOTE — PLAN OF CARE
Pt is A&OX4, anxious, but cooperative with care. Denied CP, SOB, & n/v. SBA with cane. Pt walked in the hallway this shift. Continent for both B&B; uses the bathroom. Had BM X1 this shift. Takes med whole with thin liquid. Managed pain with PRN dilaudid X1. Pt slept well for most of the night. Able to make needs known & call light is within reach. Will continue with plan of care.    Patient's most recent vital signs are:     Vital signs:  BP: 101/66  Temp: 96.9  HR: 66  RR: 16  SpO2: 100 %     Patient does not have new respiratory symptoms.  Patient does not have new sore throat.  Patient does not have a fever greater than 99.5.

## 2022-11-12 NOTE — PLAN OF CARE
The patient is alert and oriented. VSS with intermittent forgetfulness. The patient denies HA, SOB,dizziness, chest pain, N/V or new onset of weakness or dysarthria. Speech is clear, logical and spontaneous this shift. Following up on yesterdays dysarthria, today seen by speech and no new concern or intervention recommended per note. Appetite is good. Continent of bowel and bladder. Ambulates with walker and assist of 1. Sat in the recliner most of the shift. PIV on the right forearm is intact. Continue to monitor for comfort.             Patient's most recent vital signs are:     Vital signs:  BP: 100/64  Temp: 96.6  HR: 64  RR: 16  SpO2: 97 %     Patient does not have new respiratory symptoms.  Patient does not have new sore throat.  Patient does not have a fever greater than 99.5.

## 2022-11-12 NOTE — PHARMACY-TCU REVIEW OF H&P
I have reviewed this patient's TCU admission History & Physical for medication related changes/recommendations identified by the admitting provider.  I am confirming that there are no recommendations requiring changes to medication orders are indicated at this time based on the provider recommendations in the H&P.    Sonia Grover, PharmD   Phone #[phone]

## 2022-11-12 NOTE — CONSULTS
[facility]  Neurology Consultation    Patient Name:  Susan Puckett  MRN:  [id number]    :  1970  Date of Service:  2022  Primary care provider:  Harleen Billy      Neurology consultation service was asked to see Susan Puckett by Dr. Justice to evaluate for difficulty speaking.     Chief Complaint: Dysarthria versus aphasia.    History of Present Illness:   Susan Puckett is a 52 year old female with history of nonalcoholic steatohepatitis complicated by cirrhosis s/p TIPS, s/p liver transplant 2022, diabetes, GERD, and hypothyroidism.  She was admitted to [facility] care 11/10, then a stroke code was called the following morning when she was found to have left-sided facial droop and dysarthria which was thought to have been present since the prior afternoon.  As part of the stroke code she had a CT CTA of the head and neck which was unremarkable.  Thrombolytics were contraindicated due to her recent surgery, though the video stroke team did appreciate dysarthria as well as left-sided facial droop.  This was followed up with an MRI brain which was unremarkable.    Ms. Puckett describes a long history (at least decades) of bilateral eyelid drooping for which she is seeking surgery, though there is difficulty getting insurance coverage for the complete procedure. She otherwise denies diplopia, dysphagia, worsening ptosis later in the day, weakness with chewing, or improved ability to chew when eating cold foods. She has not been evaluated for myasthenia as far as she knows.     ROS  A comprehensive ROS was performed and pertinent findings were included in HPI.     PMH  Past Medical History:   Diagnosis Date     Anemia      Anxiety      Cold sore      Depressive disorder      Diabetes (H)     Type 2 DM/No Insulin      Encephalopathy      Esophageal varices without bleeding (H)     grade I     History of blood transfusion     10/2019     History  of seizure     diabetic seizure     Insomnia      Liver cirrhosis secondary to CUETO (H) 2020     Migraine      Pleural effusion      Thrombocytopenia (H)      Thyroid disease      Past Surgical History:   Procedure Laterality Date     APPENDECTOMY           BENCH LIVER N/A 2022    Procedure: Bench liver;  Surgeon: Delbert Morgan MD;  Location: [facility] OR     COLONOSCOPY      2020 at [facility]      ENT SURGERY      tempanoplasty     GI SURGERY      EGD 2020 at [facility]      GYN SURGERY      laparoscopic ablation     IR TRANSVEN INTRAHEPATIC PORTOSYST SHUNT  2021     MAMMOPLASTY REDUCTION BILATERAL       MS STAB PHELBECTOMY VARICOSE VEINS, LESS THAN 10 INCISIONS, ONE EXTREMITY       RNY gastric bypass  2006    retoocolic, retrogastric, [facility]     TONSILLECTOMY & ADENOIDECTOMY Bilateral      TRANSPLANT LIVER RECIPIENT  DONOR N/A 2022    Procedure: TRANSPLANT, LIVER, RECIPIENT,  DONOR;  Surgeon: Delbert Morgan MD;  Location: [facility] OR     WISDOM TEETH EXTRACTION         Medications   I have personally reviewed the patient's medication list.     Allergies  I have personally reviewed the patient's allergy list.     Social History  No alcohol use or smoking history    Family History    Family History   Problem Relation Age of Onset     Anesthesia Reaction Nephew         PONV     Bleeding Disorder No family hx of      Clotting Disorder No family hx of          Physical Examination   Vitals: /64 (BP Location: Left arm)   Pulse 64   Temp (!) 96.6  F (35.9  C) (Oral)   Resp 16   Wt 90.9 kg (200 lb 6.4 oz)   SpO2 97%   BMI 32.35 kg/m    General: Lying in bed, NAD  Head: NC/AT  Eyes: no icterus, op pink and moist  Cardiac: RRR. Extremities warm, no edema.   Respiratory: non-labored on RA  GI: S/NT/ND  Skin: No rash or lesion on exposed skin  Psych: Mood pleasant, affect congruent  Neuro:  Mental status: Awake, alert, attentive, oriented to  self, time, place, and circumstance. Language is fluent and coherent with intact comprehension of complex commands, naming and repetition.  Cranial nerves:   Patient has trouble keeping her eyes open though is able to open them on command.  Extraocular movements are intact, though there is esotropia noted in the right eye.  Diplopia can be evoked with sustained upward gaze.  She is mildly dysarthric.  She has full strength with eye closure and lip closure against examiner's confrontation.  VFF, PERRL, conjugate gaze, EOMI, facial sensation intact, face symmetric, shoulder shrug strong, tongue/uvula midline .  Motor: Normal bulk and tone. No abnormal movements.  Full strength in the upper arms proximally and distally including finger extension, finger , wrist extension.  Hip flexion 4+5 bilaterally, otherwise leg strength is full including dorsiflexion bilaterally.  Reflexes: Normo-reflexic and symmetric biceps, brachioradialis, patellae, and achilles. Negative Pérez, no clonus, toes down-going.  Sensory: Intact to light touch, pin, vibration, and proprioception in proximal and distal aspects of all 4 extremities   Coordination: FNF and HS without ataxia or dysmetria. Rapid alternating movements intact.   Gait: Unable to perform- patient continues with rehabilitation.    Investigations   I have personally reviewed pertinent labs, tests, and radiological imaging. Discussion of notable findings is included under Impression.     MRI Brain 11/11/22 is unremarkable.     Was patient transferred from outside hospital?   No    Impression  Susan Puckett is a 52 y.o. woman with a recent history of liver transplant who had an episode of dysarthria which raised the question of stroke. MRI Brain was negative and the dysarthria has improved, but is still notable on my exam. She is also found to have diplopia evoked with upward gaze (which she had not noted prior) and a decades-long history of ptosis. Given these findings I  question whether there may be an undiagnosed myasthenia gravis in the background, which was possibly uncovered by this recent extensive surgery. I do not see any medications on her list that are known to exacerbate myasthenia. Will recommend checking a myasthenia panel. If positive, will require follow up in neurology/ neuromuscular clinic.     Recommendations  - Myasthenia panel, serum  - If above panel is positive, will require follow-up with neuromuscular specialist  - As this panel is pending, would recommend avoiding drugs that can exacerbate myasthenia (beta-blockers, azithromycin, ciprofloxacin, risperidone) more complete list is linked here: https://www.[facility].org/drugs-to-avoid  - Inpatient neurology will follow-up these lab results.  Please call or page if any new questions arise.    Thank you for involving Neurology in the care of Susan Puckett.  Please do not hesitate to call with questions.     Tiago Rainey MD  [phone]    Addendum: Patient's myasthenia panel has resulted negative so far as he is acetylcholine receptor binding and blocking antibodies are both negative, striated muscle antibody is negative.  Acetylcholine modulating antibody is still pending, though of lower clinical significance compared to the others.    Notably, while the patient has bilateral ptosis, she has not complained of diplopia at any time over the past several years (though I was able to evoke this on examination).  It is possible for ocular myasthenia to be antibody negative and yet be debilitating for the patient.  I think it is less likely to be the case here since she is not complaining of diplopia, though I would encourage her to seek care either in neurology or ophthalmology if she experiences diplopia in addition to her ptosis.

## 2022-11-12 NOTE — PROGRESS NOTES
"   11/12/22 0922   Appointment Info   Signing Clinician's Name / Credentials (SLP) Brenda Robins MS, CCC-SLP   General Information   Onset of Illness/Injury or Date of Surgery 11/01/22   Referring Physician Reshma   Pertinent History of Current Problem Susan Puckett is a 52-year-old female with a history of nonalcoholic steatohepatitis complicated by cirrhosis s/p TIPS with history of cirrhosis related complications including pleural effusion, ascites, hepatic encephalopathy, hepatic hydrothorax s/p multiple thoracenteses.  Patient also has a history of anemia, insulin-dependent diabetes, GERD hypothyroidism, depression, anxiety, migraines, HSV infection. Patient is s/p Faustino-en-Y gastric bypass.  Patient presented [facility] on 11/1/2022 for orthotopic liver transplant on 11/2/2022 with Dr. Delbert Morgan.  Surgery was largely uncomplicated.  Hospital course complicated by multiple metabolic derangements including hypokalemia, hyponatremia, hypophosphatemia all of which were addressed adequately.  Patient also had acute blood loss anemia but hemoglobin remained stable postop.  There was noted elevated LFTs postop which improved during course of hospital stay.  Patient was discharged to TCU on 11/10/2022 for ongoing medical management and rehabilitation. Course here in the TCU has been notable for acute onset dysarthria.   General Observations Pt initially asleep upon SLP arrival but awakened easily to voice.  Pt agreeable to assessment but states \"Theyre making a big deal out of a single day\".   Pain Assessment   Patient Currently in Pain No   Type of Evaluation   Type of Evaluation Speech, Language, Cognition   Oral Motor   Oral Musculature generally intact   Dentition (Oral Motor)   Dentition (Oral Motor) natural dentition   Facial Symmetry (Oral Motor)   Facial Symmetry (Oral Motor) WNL   Lip Function (Oral Motor)   Comment, Lip Function (Oral Motor) WNL   Tongue Function (Oral Motor)   Comment, Tongue " "Function (Oral Motor) WNL   Jaw Function (Oral Motor)   Comment, Jaw Function (Oral Motor) WNL   Cough/Swallow/Gag Reflex (Oral Motor)   Comment, Cough/Swallow/Gag Reflex (Oral Motor) WNL   Vocal Quality/Secretion Management (Oral Motor)   Vocal Quality (Oral Motor) WNL   Secretion Management (Oral Motor) WNL   Motor Speech   Speech Intelligibility (Motor Speech) WNL   Comment, Motor Speech Assessment WNL   Western Aphasia Battery- Revised Bedside Record From   Spontaneous Speech Content Score (out of 10) 10   Spontaneous Speech Fluency Score (out of 10) 10   Auditory Verbal Comprehension Score (out of 10) 10   Sequential Commands Score (out of 10) 10   Repetition Score (out of 10) 10   Object Naming Score (out of 10) 10   Bedside Aphasia Sum 60   WAB-R Bedside Aphasia Score 100   Reading Score (out of 10) 10   Writing Score (out of 10) 10   Bedside Language Sum 80   Bedside Language Score 100   Auditory Comprehension   Comment, Assessment (Auditory Comprehension) WNL   Verbal Expression   Comment, Assesment (Verbal Expression) WNL   Pragmatic Language   Comment, Assessment (Pragmatic Language) WNL   Reading Comprehension   Comment, Assessment (Reading Comprehension) WNL   Written Language   Comment, Assessment (Written Language) WNL   Cognition   Cognitive Status Alert, oriented x 4   Affect/Mental Status (Cognition) WNL   Orientation Status (Cognition) oriented x 4   Follows Commands (Cognition) follows two-step commands   Cognitive Function WFL   Cognitive Status Exam Comments SLP administered CLQT, with pt scoring WNL across all areas.   Clinical Impression   Criteria for Skilled Therapeutic Interventions Met (SLP Eval) No problems identified which require skilled intervention   Risks & Benefits of therapy have been explained evaluation/treatment results reviewed   Clinical Impression Comments SLP: Pt seen on TCU following stroke alert being called on previous day.  Pt was observed to have \"stuttering\" speech, " dysarthria, and word finding difficulties.  Pt was given the WAB and CLQT and found to be WNL with each.  See below for CLQT scores. Pt reports symptoms have resolved and denies any further difficulties.  Recommend no further SLP services at this time, but continue to monitor for changes and re-consult with SLP, if indicated.  Pt educated re: impressions, verbalized agreement, and expressed agreement. Will complete order.   SLP Total Evaluation Time   Cognitive  Performance Testing Minutes, per hour - includes time for administering test, interp results & prep report (21633) 60   Therapy Certification   Start of Care Date 11/12/22   Certification date from 11/12/22   Certification date to 11/12/22   SLP Goals   Therapy Frequency (SLP Eval) one time eval and treatment only   Total Session Time   Timed Code Treatment Minutes 60   Total Session Time (sum of timed and untimed services) 60   Post Acute Settings Only   What unit is patient on? TCU     SUMMARY OF TEST:    The CLQT assesses visual attention and perception, working memory and language output skills, as well as auditory memory and comprehension.  Non-linguistic tasks can help assess planning, and self-monitoring, visual discrimination and analysis, as well as creativity and mental flexibility.   Together, these subtests assess the cognitive domains of attention, memory, executive function, language, and visuospatial skills using a severity rating of either WNL (within normal limits), Mild, Moderate or Severe.        Cognitive Domain                   Severity Rating/Score  Attention                                   WNL/180  Memory                                    WNL/172  Executive Functions                 WNL/25  Language                                 WNL/34  Visuospatial Skills                    WNL/83  Composite Severity Rating        WNL/4  Clock Drawing Severity Rating    WNL/13    INTERPRETATION OF TEST RESULTS: Pt currently scoring WNL across  all domains.  No SLP services are indicated at this time.  Please re-consult if pt experiences change in function.     TIME FOR INTERPRETATION AND PREPARATION OF REPORT:   TOTAL TIME:     Reference:  Kris Love, CCC-SLP, (2001) [employer]

## 2022-11-12 NOTE — PLAN OF CARE
"Goal Outcome Evaluation:  Patient is AO, speech is unclear, new stutter and dysartric. However, patient able to verbalize needs. C/o N/V at the beginning of shift, prn Zofran given with good effect. Patient verbalized migraine and anxiety as well abdominal pain. Prn Maxalt, ativan and dilaudid were given before patient was transported for MRI. Patient declined TB test and prefers to have in tomorrow instead. Patient left for MRI and 1800hrs and came back at 2000hrs. Patient needs SBA with transfers, denied CP or SOB. Patient to order dinner form outside.  Patient ate 25% of her dinner, BS was 232 at bedtime and sliding scale insulin was given per orders.Prn dilaudid was given at 10.30pm, declined scheduled trazodone saying  \"I will request it later after getting situated\"      Patient's most recent vital signs are:     Vital signs:  BP: 101/66  Temp: 96.9  HR: 66  RR: 16  SpO2: 100 %     Patient does not have new respiratory symptoms.  Patient does not have new sore throat.  Patient does not have a fever greater than 99.5.         "

## 2022-11-13 ENCOUNTER — APPOINTMENT (OUTPATIENT)
Dept: OCCUPATIONAL THERAPY | Facility: SKILLED NURSING FACILITY | Age: 52
DRG: 949 | End: 2022-11-13
Attending: INTERNAL MEDICINE
Payer: COMMERCIAL

## 2022-11-13 LAB
GLUCOSE BLDC GLUCOMTR-MCNC: 174 MG/DL (ref 70–99)
GLUCOSE BLDC GLUCOMTR-MCNC: 179 MG/DL (ref 70–99)
GLUCOSE BLDC GLUCOMTR-MCNC: 190 MG/DL (ref 70–99)
GLUCOSE BLDC GLUCOMTR-MCNC: 251 MG/DL (ref 70–99)
GLUCOSE BLDC GLUCOMTR-MCNC: 83 MG/DL (ref 70–99)
GLUCOSE BLDC GLUCOMTR-MCNC: 91 MG/DL (ref 70–99)

## 2022-11-13 PROCEDURE — 250N000013 HC RX MED GY IP 250 OP 250 PS 637: Performed by: INTERNAL MEDICINE

## 2022-11-13 PROCEDURE — 120N000009 HC R&B SNF

## 2022-11-13 PROCEDURE — 250N000011 HC RX IP 250 OP 636: Performed by: INTERNAL MEDICINE

## 2022-11-13 PROCEDURE — 97535 SELF CARE MNGMENT TRAINING: CPT | Mod: GO

## 2022-11-13 PROCEDURE — 99309 SBSQ NF CARE MODERATE MDM 30: CPT | Performed by: INTERNAL MEDICINE

## 2022-11-13 PROCEDURE — 250N000012 HC RX MED GY IP 250 OP 636 PS 637: Performed by: INTERNAL MEDICINE

## 2022-11-13 RX ADMIN — METHOCARBAMOL 500 MG: 500 TABLET ORAL at 21:36

## 2022-11-13 RX ADMIN — PANCRELIPASE 2 CAPSULE: 24000; 76000; 120000 CAPSULE, DELAYED RELEASE PELLETS ORAL at 08:13

## 2022-11-13 RX ADMIN — NYSTATIN 500000 UNITS: 100000 SUSPENSION ORAL at 21:35

## 2022-11-13 RX ADMIN — SITAGLIPTIN 100 MG: 100 TABLET, FILM COATED ORAL at 08:07

## 2022-11-13 RX ADMIN — NYSTATIN 500000 UNITS: 100000 SUSPENSION ORAL at 17:09

## 2022-11-13 RX ADMIN — NYSTATIN 500000 UNITS: 100000 SUSPENSION ORAL at 08:06

## 2022-11-13 RX ADMIN — ESCITALOPRAM OXALATE 10 MG: 10 TABLET ORAL at 08:06

## 2022-11-13 RX ADMIN — SULFAMETHOXAZOLE AND TRIMETHOPRIM 1 TABLET: 400; 80 TABLET ORAL at 08:07

## 2022-11-13 RX ADMIN — ONDANSETRON 4 MG: 4 TABLET, ORALLY DISINTEGRATING ORAL at 18:19

## 2022-11-13 RX ADMIN — POTASSIUM PHOSPHATE, MONOBASIC 500 MG: 500 TABLET, SOLUBLE ORAL at 08:07

## 2022-11-13 RX ADMIN — SENNOSIDES AND DOCUSATE SODIUM 2 TABLET: 50; 8.6 TABLET ORAL at 08:07

## 2022-11-13 RX ADMIN — LEVOTHYROXINE SODIUM 150 MCG: 75 TABLET ORAL at 06:33

## 2022-11-13 RX ADMIN — NYSTATIN 500000 UNITS: 100000 SUSPENSION ORAL at 12:50

## 2022-11-13 RX ADMIN — HYDROMORPHONE HYDROCHLORIDE 2 MG: 2 TABLET ORAL at 08:05

## 2022-11-13 RX ADMIN — POTASSIUM PHOSPHATE, MONOBASIC 500 MG: 500 TABLET, SOLUBLE ORAL at 21:36

## 2022-11-13 RX ADMIN — METHOCARBAMOL 500 MG: 500 TABLET ORAL at 12:50

## 2022-11-13 RX ADMIN — TACROLIMUS 6 MG: 5 CAPSULE ORAL at 19:21

## 2022-11-13 RX ADMIN — POLYETHYLENE GLYCOL 3350 17 G: 17 POWDER, FOR SOLUTION ORAL at 08:07

## 2022-11-13 RX ADMIN — LIDOCAINE PATCH 4% 2 PATCH: 40 PATCH TOPICAL at 08:07

## 2022-11-13 RX ADMIN — MYCOPHENOLATE MOFETIL 750 MG: 250 CAPSULE ORAL at 19:20

## 2022-11-13 RX ADMIN — Medication 1 CAPSULE: at 12:50

## 2022-11-13 RX ADMIN — PANCRELIPASE 2 CAPSULE: 24000; 76000; 120000 CAPSULE, DELAYED RELEASE PELLETS ORAL at 19:21

## 2022-11-13 RX ADMIN — METHOCARBAMOL 500 MG: 500 TABLET ORAL at 17:09

## 2022-11-13 RX ADMIN — TRAZODONE HYDROCHLORIDE 50 MG: 50 TABLET ORAL at 23:04

## 2022-11-13 RX ADMIN — METHOCARBAMOL 500 MG: 500 TABLET ORAL at 08:07

## 2022-11-13 RX ADMIN — PANCRELIPASE 2 CAPSULE: 24000; 76000; 120000 CAPSULE, DELAYED RELEASE PELLETS ORAL at 12:50

## 2022-11-13 RX ADMIN — HYDROMORPHONE HYDROCHLORIDE 2 MG: 2 TABLET ORAL at 17:08

## 2022-11-13 RX ADMIN — MAGNESIUM OXIDE TAB 400 MG (241.3 MG ELEMENTAL MG) 400 MG: 400 (241.3 MG) TAB at 08:06

## 2022-11-13 RX ADMIN — INSULIN ASPART 9 UNITS: 100 INJECTION, SOLUTION INTRAVENOUS; SUBCUTANEOUS at 08:13

## 2022-11-13 RX ADMIN — GABAPENTIN 200 MG: 100 CAPSULE ORAL at 21:36

## 2022-11-13 RX ADMIN — Medication 500 MG: at 08:06

## 2022-11-13 RX ADMIN — ASPIRIN 81 MG CHEWABLE TABLET 81 MG: 81 TABLET CHEWABLE at 08:06

## 2022-11-13 RX ADMIN — MYCOPHENOLATE MOFETIL 750 MG: 250 CAPSULE ORAL at 08:06

## 2022-11-13 RX ADMIN — Medication 500 MG: at 17:09

## 2022-11-13 RX ADMIN — SENNOSIDES AND DOCUSATE SODIUM 2 TABLET: 50; 8.6 TABLET ORAL at 21:35

## 2022-11-13 RX ADMIN — INSULIN GLARGINE 12 UNITS: 100 INJECTION, SOLUTION SUBCUTANEOUS at 08:15

## 2022-11-13 RX ADMIN — TOPIRAMATE 50 MG: 50 TABLET ORAL at 08:06

## 2022-11-13 RX ADMIN — VALGANCICLOVIR HYDROCHLORIDE 450 MG: 450 TABLET ORAL at 08:18

## 2022-11-13 RX ADMIN — TACROLIMUS 6 MG: 5 CAPSULE ORAL at 08:06

## 2022-11-13 RX ADMIN — INSULIN ASPART 2 UNITS: 100 INJECTION, SOLUTION INTRAVENOUS; SUBCUTANEOUS at 19:58

## 2022-11-13 RX ADMIN — HYDROMORPHONE HYDROCHLORIDE 2 MG: 2 TABLET ORAL at 23:04

## 2022-11-13 RX ADMIN — HYDROMORPHONE HYDROCHLORIDE 2 MG: 2 TABLET ORAL at 01:02

## 2022-11-13 RX ADMIN — PANTOPRAZOLE SODIUM 40 MG: 40 TABLET, DELAYED RELEASE ORAL at 06:33

## 2022-11-13 RX ADMIN — GABAPENTIN 200 MG: 100 CAPSULE ORAL at 08:06

## 2022-11-13 ASSESSMENT — ACTIVITIES OF DAILY LIVING (ADL)
ADLS_ACUITY_SCORE: 31

## 2022-11-13 NOTE — PLAN OF CARE
Goal Outcome Evaluation:    Patient AO, able to make needs known. Walks SBA with a cane. Continent of both B&B using the toilet. Patient continues to have pain on the abdominal incision site, prn dilaudid given X1 with good effect. Incision looks well approximated, staples on and TRACEY. BS wa 211 at dinner time and 134 at bedtime. Speech was clear today, no slurring or dysarthria noted. Denied CP, N/V or SOB, seems to be in good spirits today. Patient prefers her sleeping medication(Trazodone) to be moved to 11pm.      Patient's most recent vital signs are:     Vital signs:  BP: 92/57  Temp: 96.5  HR: 64  RR: 16  SpO2: 99 %     Patient does not have new respiratory symptoms.  Patient does not have new sore throat.  Patient does not have a fever greater than 99.5.

## 2022-11-13 NOTE — PLAN OF CARE
Pt is A&OX4, calm, & cooperative with care. Denied CP, SOB, & n/v. No dysarthric speech this shift. SBA with cane. Continent for both B&B; uses the bathroom. Takes med whole with thin liquid. Managed pain with PRN dilaudid X1. Pt slept well for most of the night. Able to make needs known & call light is within reach. Will continue with plan of care.    Patient's most recent vital signs are:     Vital signs:  BP: 92/57  Temp: 96.5  HR: 64  RR: 16  SpO2: 99 %     Patient does not have new respiratory symptoms.  Patient does not have new sore throat.  Patient does not have a fever greater than 99.5.

## 2022-11-13 NOTE — PROGRESS NOTES
[facility]itional [facility]    Medicine Progress Note - Hospitalist Service    Date of Admission:  11/10/2022    Assessment & Plan     Susan Puckett is a 52-year-old female with a history of nonalcoholic steatohepatitis complicated by cirrhosis s/p TIPS with history of cirrhosis related complications including pleural effusion, ascites, hepatic encephalopathy, hepatic hydrothorax s/p multiple thoracenteses.  Patient also has a history of anemia, insulin-dependent diabetes, GERD hypothyroidism, depression, anxiety, migraines, HSV infection. Patient is s/p Faustino-en-Y gastric bypass.  Patient presented [facility] on 11/1/2022 for orthotopic liver transplant on 11/2/2022 with Dr. Delbert Morgan.  Surgery was largely uncomplicated.  Hospital course complicated by multiple metabolic derangements including hypokalemia, hyponatremia, hypophosphatemia all of which were addressed adequately.  Patient also had acute blood loss anemia but hemoglobin remained stable postop.  There was noted elevated LFTs postop which improved during course of hospital stay.  Patient was discharged to TCU on 11/10/2022 for ongoing medical management and rehabilitation. Course here in the TCU has been notable for acute onset dysarthria which has since improved significantly.            #History of decompensated cirrhosis  #S/p orthotopic liver transplant on 11/2/2022 with Dr. Vizcaino  Ultrasound abdomen on 11/4 shows persistence of elevated resistive indicis in main and right hepatic arteries, with slightly improved flow in left hepatic artery.  Portal veins noted to be patent with appropriate directional flow.  Immunosuppression  completed steroid taper on 11/07  Tacrolimus 6 mg BID (goal 8 - 12)  Last tacro level 11/10 => 10.5  Next draw 11/14  Mycophenolate 750 mg bid  Infection ppx  Bactrim x6 months  valganciclovir x12 weeks (11/03/22 - 03/03/23)  Anticoagulation with ASA x6 months  Cont to monitor LFTs     #Dysarthria  Onset was  evening of 11/10. On 11/11 code stroke called with CT imaging showing no evidence of stroke. MRI brain w&w/o IV contrast also unremarkable.  Dysathria has since improved significantly since admission.  Neurology saw patient on 11/12 recommending obtaining myasthenia panel    Etiology unclear but likely combination of metabolic derangement, increased stress, medication side effect  -Patient seen and cleared by speech on 11/12  Follow-up myasthenia panel; if positive, patient will require Neuro-muscular follow-up  Neurology recommending avoiding drugs that can exacerbate myasthenia (beta-blockers, azithromycin, ciprofloxacin, risperidone) more complete list is linked here: https://www.myaware.org/drugs-to-avoid  Correct electrolyte abnormalities as below     #Postop pain  PO dilaudid 2mg Q6h prn Robaxin 500mg QID, Atarax PRN. add lidocaine patches.     #Microcytic anemia, chronic - baseline hgb ~9.0  #Thrombocytopenia, chronic - likely due to hx of cirrhosis   -no evidence of bleed at this time  Cont to monitor     #GEORGETTE-resolved  -likely pre-renal  -ctm     #Hyponatremia-improving  #Hypokalemia-resolved  #Hypocalcemia-improving  #Hypophosphatemia-improving  Replete above electrolytes and continue to monitor  Cont ca++ supplementation      #Hypoalbuminemia-improving  Nutrition consult  Cont to monitor     #History of Faustino-en-Y gastric bypass  #Pancreatic insufficiency  Dietary consult  Creon [id number] with meals and snacks        #Insulin-dependent diabetes  A1c 5.7 on 11/02  Continue Lantus 12 units every morning  NovoLog high insulin resistance with meals  Add PTA Januvia 100 mg every day      #Hypothyroidism  PTA levothyroxine 150 mcg     #Depression, #anxiety  Continue PTA Lexapro 10 mg daily  Trazodone nightly 50 mg.     #History of migraine  Cont PTA topiramate 50 mg daily     #History of seizure  First at the age of 15 after staying up all night while volunteering; second was following hypoglycemic episode.  "    Immunization:   Immunization History   Administered Date(s) Administered    COVID-19,PF,Pfizer (12+ Yrs) 03/11/2021, 04/03/2021, 09/07/2021    FLU 6-35 months 10/15/2019    HepB-Adult 10/24/2006, 12/04/2006, 04/27/2007    Influenza Vaccine IM > 6 months Valent IIV4 (Alfuria,Fluzone) 12/13/2018, 10/15/2019, 10/05/2020, 09/28/2021    Mantoux Tuberculin Skin Test 11/12/2022    Pneumococcal 23 valent 11/22/2019    TD (ADULT, 7+) 09/20/2002    TDAP Vaccine (Boostrix) 10/03/2012    Twinrix A/B 10/31/2019, 10/31/2019, 08/11/2020, 08/11/2020, 02/04/2021     Indication for psychotropic medications:   - Escitalopram 10 mg for depression  - Gabapentin 200 mg twice daily for neuropathic pain  - Topiramate 50 mg daily for migraine prophylaxis  - Trazodone 50 mg nightly, for insomnia, depression  - Atarax as needed for pain, anxiety  - Dilaudid p.o. for acute postop pain    Is/Os: No intake or output data in the 24 hours ending 11/13/22 0719  Length of stay: 3  Diet: Regular Diet Adult  Snacks/Supplements Adult: Ensure Clear; With Meals    DVT Prophylaxis:  mg   Rossi Catheter: Not present  Central Lines: None  Cardiac Monitoring: None  Code Status: Full Code      Disposition Plan     Expected Discharge Date: 11/16/2022      Destination: home with family (Spouse)          The patient's care was discussed with the Patient.    ERIKA MARTÍNEZ MD  Hospitalist Service  [facility]  Securely message with the Vocera Web Console (learn more here)  Text page via [employer] Paging/Directory         Clinically Significant Risk Factors         # Hyponatremia: Lowest Na = 133 mmol/L (Ref range: 136-145) in last 2 days, will monitor as appropriate      # Hypoalbuminemia: Lowest albumin = 2.2 g/dL (Ref range: 3.5-5.2) at 11/12/2022  7:18 AM, will monitor as appropriate          # Obesity: Estimated body mass index is 32.35 kg/m  as calculated from the following:    Height as of 11/2/22: 1.676 m (5' 6\").    Weight " as of this encounter: 90.9 kg (200 lb 6.4 oz)., PRESENT ON ADMISSION         ______________________________________________________________________    Interval History   NAEON  Speech becoming clearer  No fever, chills; working with PT & OT      Data reviewed today: I reviewed all medications, new labs and imaging results over the last 24 hours.      Physical Exam   Vital Signs: Temp: (!) 96.5  F (35.8  C) Temp src: Oral BP: 92/57 Pulse: 64   Resp: 16 SpO2: 99 % O2 Device: None (Room air)    Weight: 200 lbs 6.4 oz      General appearance: Alert, in no acute distress and Ox3   HEENT: Normocephalic, atraumatic; Pupils are equal, round and react to light; Extraocular   movements intact; Conjuctivae are Normal; Mucous membranes moist and without lesions   Pulm: clear to ausculation bilaterally, no wheeze, rales or rhonchi   CVS: RRR, no r/g; faint systolic murmur loudest in RUSB   GI: normoactive bowel sounds, post-surgical wound with staples running across upper part of abdomen c/d/i  Extremities: No cyanosis, clubbing or edema   Neurologic:   - Mental Status: Alert, relaxed, and cooperative. Thought process coherent. Oriented to   person, place, and time.    - Speech: clear, coherent, normal-paced  - Motor: Good muscle bulk and tone.   - Strength 5/5 throughout.     Data   Recent Labs   Lab 11/13/22  0156 11/12/22  2135 11/12/22  1721 11/12/22  0749 11/12/22  0718 11/11/22  1242 11/11/22  1055 11/11/22  0813 11/11/22  0742 11/10/22  1309 11/10/22  0936   WBC  --   --   --   --  4.8  --   --   --  4.9  --  4.7   HGB  --   --   --   --  8.2*  --   --   --  8.7*  --  10.0*   MCV  --   --   --   --  95  --   --   --  93  --  98   PLT  --   --   --   --  185  --   --   --  160  --  136*   NA  --   --   --   --  135  --  133  --   --   --  133*   POTASSIUM  --   --   --   --  4.5  --  4.6  --   --   --  4.3   CHLORIDE  --   --   --   --  103  --  103  --   --   --  100   CO2  --   --   --   --  25  --  23  --   --   --  25    BUN  --   --   --   --  21  --  17  --   --   --  16.6   CR  --   --   --   --  1.07*  --  0.98  --   --   --  0.99*   ANIONGAP  --   --   --   --  7  --  7  --   --   --  8   MAURI  --   --   --   --  7.1*  --  7.3*  --   --   --  7.6*   * 134* 211*   < > 306*   < > 213*   < >  --    < > 177*   ALBUMIN  --   --   --   --  2.2*  --  2.6*  --   --   --  2.9*   PROTTOTAL  --   --   --   --  4.8*  --  5.2*  --   --   --  5.3*   BILITOTAL  --   --   --   --  0.5  --  0.7  --   --   --  0.7   ALKPHOS  --   --   --   --  186*  --  242*  --   --   --  256*   ALT  --   --   --   --  64*  --  92*  --   --   --  108*   AST  --   --   --   --  17  --  28  --   --   --  39*    < > = values in this interval not displayed.     No results found for this or any previous visit (from the past 24 hour(s)).  Medications    - MEDICATION INSTRUCTIONS -        - Skin Test Reading (tuberculin) -   Does not apply Q21 Days    amylase-lipase-protease  2 capsule Oral TID w/meals    aspirin  81 mg Oral Daily    calcium carbonate  500 mg Oral BID w/meals    escitalopram  10 mg Oral Daily    gabapentin  200 mg Oral BID    insulin aspart  1-10 Units Subcutaneous TID AC    insulin aspart  1-7 Units Subcutaneous At Bedtime    insulin aspart   Subcutaneous TID w/meals    insulin glargine  12 Units Subcutaneous QAM AC    levothyroxine  150 mcg Oral QAM AC    lidocaine  2 patch Transdermal Q24H    lidocaine   Transdermal Q8H JULIEN    magnesium oxide  400 mg Oral Daily    methocarbamol  500 mg Oral 4x Daily    multivitamin CF FORMULA  1 capsule Oral Daily    mycophenolate  750 mg Oral two times daily    nystatin  500,000 Units Swish & Swallow 4x Daily    pantoprazole  40 mg Oral QAM AC    polyethylene glycol  17 g Oral Daily    potassium phosphate (monobasic)  500 mg Oral BID    senna-docusate  2 tablet Oral BID    sitagliptin  100 mg Oral Daily    sulfamethoxazole-trimethoprim  1 tablet Oral Daily    tacrolimus  6 mg Oral two times daily     topiramate  50 mg Oral Daily    traZODone  50 mg Oral At Bedtime    tuberculin  5 Units Intradermal Q21 Days    valGANciclovir  450 mg Oral Daily

## 2022-11-13 NOTE — PROGRESS NOTES
Social Work: Initial Assessment with Discharge Plan    Patient Name: Susan Puckett  : 1970  Age: 52 year old  MRN: [id number]  Completed assessment with: chart review and interview w/patient  Admitted to TCU: 22    Presenting Information   Date of SW assessment: 2022  Health Care Directive: provided education and blank copy for patient, however she reports having completed this and will provide a copy if able  Primary Health Care Agent: patient  Secondary Health Care Agent: spouse, Rivera  Living Situation: pt resides with spouse, Rivera in a single family home in [facility]  Previous Functional Status: independent w/ADL's and driving PTA  DME available: none  Patient and family understanding of hospitalization: appropriate and pleasant  Cultural/Language/Spiritual Considerations: Pt is a 53yo  female who is English speaking and identifies as Jain  Abuse concerns: none  -------------------------------------------------------------------------------------------------------------  TRANSPORTATION:    Has lack of transportation kept you from medical appointments, meetings, work, or from getting things needed for daily living?  A. Yes, it has kept me from medical appointments or from getting my medications  B. Yes, it has kept me from non-medical meetings, appointments, work or from getting things that I need  C. No  X. Patient Unable to respond  Y. Patient declines to respond  -------------------------------------------------------------------------------------------------------------  Health Literacy:   How Often do you need to have someone help you when you read instructions, pamphlets, or other written material from your doctor or pharmacy?  0.       Never  1.       Rarely  2.       Sometimes  3.       Often  4.       Always  5.       Patient declines to respond  6.       Patient unable to  "respond  ------------------------------------------------------------------------------------------------------------   BIMS:  See flow sheet   Tell the pt : \"I am going to say three words for you to remember.  Please repeat the words after I have said all three.  The words are:Sock, Blue and Bed. Now tell me the three words.\"     Number of words repeated after the first attempt.  0: None   1: One  2: Two  3: Three  Ask the pt: \"Please tell me what year it is right now?\"  0: Missed by 5 >5 years or no answer   1: Missed by 2-5 years  2: Missed by a 1 year  3: Correct      Ask the pt: \"What month are we in right now?\"  0: Missed by > 1 month or no answer.  1: Missed by 6 days to 1 month  2: Accurate within 5 days.     Ask pt: \"what day of the week is today?\"  0: Incorrect day of the week.  1: Correct.     Ask pt:\" Let's go back to an earlier question.  What were those three words that I asked you to repeat?\" If unable to remember a word, give a cue (something to wear, a color, a piece of furniture) for that word.   Able to recall Sock.  0: No, could not recall.  1: Yes, after cueing (something to wear)  2: Yes, no cueing required.   Able to recall Blue.  0: No, could not recall.  1:Yes, after cueing (a color)  2:Yes, no cueing required.   Able to recall Bed.  0: No,  1: Yes, after cueing (a piece of furniture)  2: Yes, no cueing required.   -----------------------------------------------------------------------------------------------------------  CAM:  1.) Acute Onset/Fluctuating Course:  Is there evidence of an acute change in mental status from the patient's baseline?  0. No   1. Yes  2.) Inattention:  Does the patient have difficulty focusing attention, for example, being easily distractable, or having difficulty keeping track of what was being said?  0. No - Behavior not present  1. Yes - Behavior present  3.) Disorganized Thinking:  Is the patient's speech disorganized or incoherent, such as rambling or " irrelevant conversation, unclear or illogical flow of ideas, or unpredictable switching from subject to subject?  0. No - Behavior not present  1. Yes - Behavior present  4.) Altered level of consciousness:  Did the patient have altered level of consciousness as indicated by any of the following criteria?  0. No - Alert  1. Yes - Vigilant (hyperalert), lethargic (drowsy) , stupor (difficult to arouse) or comatose (unable to be aroused)  -------------------------------------------------------------------------------------------------------------  PHQ-9:   Over the last 2 weeks, have you been bothered by any of the following problems?     If symptom is present, then ask the patient:  About how often have you been bothered by this?   Symptom Presence                                              Symptom Frequency  0. No                                                                     0. Never or 1 day  1. Yes                                                                   1. 2-6 days (several days)  9. No Response                                                    2. 7-11 days (half or more of the days)                                                                                3. 12-14 days (nearly every day)       Present Frequency   A.       Little interest of pleasure doing things  1  1   B.       Feeling down, depressed, or hopeless  1  1   C.       Trouble falling or staying asleep, or sleeping too much  1  3   D.       Feeling tired or having little energy  1  3   E.       Poor appetite or overeating  1  1   F.        Feeling bad about yourself - or that you are a failure, or have let yourself or your family down  1  1   G.      Trouble concentrating on things, such as reading the newspaper or watching television  1  1   H.      Moving or speaking so slowly that other people could have noticed. Or the opposite - being so fidgety or restless that you have been moving around a lot more than usual  1  1   I.          Thoughts that you would be better off dead, or of hurting yourself in some way  0       -------------------------------------------------------------------------------------------------------------  SOCIAL ISOLATION  How often do you feel lonely or isolated form those around you?  0.       Never  1.       Rarely  2.       Sometimes  3.       Often  4.       Always  5.       Patient declines to respond  6.       Patient unable to respond  -------------------------------------------------------------------------------------------------------------    BIMS: Pt [id number] on BIMS indicating cognition intact  PHQ-9: Pt scored 12 on PHQ-9 indicating moderate depression  PAS: confirmation number- [id number]  Has there been a level II screen?  No  Were there any recommendations in the screen? No  If yes, will the recommendations we incorporated into the Plan of Care?  N/A  Physical Health  Reason for admission:   Per H&P, Italo Susan is a 52-year-old female with a history of nonalcoholic steatohepatitis complicated by cirrhosis s/p TIPS with history of cirrhosis related complications including pleural effusion, ascites, hepatic encephalopathy, hepatic hydrothorax s/p multiple thoracenteses.  Patient also has a history of anemia, insulin-dependent diabetes, GERD hypothyroidism, depression, anxiety, migraines, HSV infection. Patient is s/p Faustino-en-Y gastric bypass.  Patient presented [facility] on 11/1/2022 for orthotopic liver transplant on 11/2/2022 with Dr. Delbert Morgan.  Surgery was largely uncomplicated.  Hospital course complicated by multiple metabolic derangements including hypokalemia, hyponatremia, hypophosphatemia all of which were addressed adequately.  Patient also had acute blood loss anemia but hemoglobin remained stable postop.  There was noted elevated LFTs postop which improved during course of hospital stay.  Patient was discharged to TCU on 11/10/2022 for ongoing medical management and  rehabilitation. Course here in the TCU has been notable for acute onset dysarthria.      Provider Information   Primary Care Physician:Harleen Billy   : Juanita Bhagat [facility]-transplant team    Mental Health:   Diagnosis: depression  Current Support/Services: liver transplant group  Previous Services: therapy through [facility]  Services Needed/Recommended: none    Substance Use:  Diagnosis: none  Current Support/Services: na  Previous Services: na  Services Needed/Recommended: none    Support System  Marital Status:  to spouse, Roly  Family support: parents, children  Other support available: friends  Gaps in support system: none reported    Community Resources  Current in home services: none  Previous services: none    Financial/Employment/Education  Employment Status: not employed  Income Source: spouse's income  Education: some college  Financial Concerns:  Yes, related to cost of hospital stay  Insurance: [employer]    Discharge Plan   Patient and family discharge goal: home w/HHC if appropriate and recommended  Provided Education on discharge plan: YES  Patient agreeable to discharge plan:  YES  A list of Medicare Certified Facilities was provided to the patient and/or family to encourage patient choice. Based on location and rating, patient would like referrals made to: [facility]  General information regarding anticipated insurance coverage and possible out of pocket cost was discussed. Patient and patient's family are aware patient may incur the cost of transportation to the facility, pending insurance payment: NO  Barriers to discharge: none noted    Discharge Recommendations   Disposition: home w/HHC, pt reports a referral was previously submitted to [facility] agency near her home, but could not recall the name of the agency at the time of the initial assessment  Transportation Needs: family to provide  Name of Transportation Company and Phone: n/a    Additional comments    Patient presents with kind demeanor and states she missed an interview with [facility] for combined application for assistance ([employer]) for food and cash due to transplant surgery.  Pt will independently contact [facility] interviewer but may request supports with obtaining necessary proofs.    Vinita Dang [facility], Transitional Care Unit   Social Work   [address] [place] [address], [address] [place] [address]  ) [phone]

## 2022-11-13 NOTE — PLAN OF CARE
The patient is alert and oriented. VSS with intermittent forgetfulness. The patient denies HA, SOB, dizziness, chest pain, N/V, new onset of weakness or dysarthria. Speech is clear, logical and spontaneous this shift. Appetite is good. Continent of bowel and bladder. Ambulates with walker and assist of 1. Likes to sit in the recliner. PIV on the right forearm is intact. Patient stated that she was on Furosemide and Spironolactone at home and would like to know if meds should be restarted. Sticky left for the provider about the patient's inquiry. Continue to monitor for comfort.         Patient's most recent vital signs are:     Vital signs:  BP: 104/64  Temp: 97.8  HR: 67  RR: 16  SpO2: 96 %     Patient  have new respiratory symptoms.  Patient does not have new sore throat.  Patient does not have a fever greater than 99.5.

## 2022-11-14 ENCOUNTER — APPOINTMENT (OUTPATIENT)
Dept: PHYSICAL THERAPY | Facility: SKILLED NURSING FACILITY | Age: 52
DRG: 949 | End: 2022-11-14
Attending: INTERNAL MEDICINE
Payer: COMMERCIAL

## 2022-11-14 ENCOUNTER — APPOINTMENT (OUTPATIENT)
Dept: OCCUPATIONAL THERAPY | Facility: SKILLED NURSING FACILITY | Age: 52
DRG: 949 | End: 2022-11-14
Attending: INTERNAL MEDICINE
Payer: COMMERCIAL

## 2022-11-14 LAB
ACHR BIND AB SER-SCNC: 0 NMOL/L
ALBUMIN SERPL-MCNC: 2.6 G/DL (ref 3.4–5)
ALP SERPL-CCNC: 180 U/L (ref 40–150)
ALT SERPL W P-5'-P-CCNC: 47 U/L (ref 0–50)
ANION GAP SERPL CALCULATED.3IONS-SCNC: 5 MMOL/L (ref 3–14)
ANION GAP SERPL CALCULATED.3IONS-SCNC: 6 MMOL/L (ref 3–14)
AST SERPL W P-5'-P-CCNC: 18 U/L (ref 0–45)
BILIRUB SERPL-MCNC: 0.5 MG/DL (ref 0.2–1.3)
BUN SERPL-MCNC: 17 MG/DL (ref 7–30)
BUN SERPL-MCNC: 19 MG/DL (ref 7–30)
CALCIUM SERPL-MCNC: 8.2 MG/DL (ref 8.5–10.1)
CALCIUM SERPL-MCNC: 8.2 MG/DL (ref 8.5–10.1)
CHLORIDE BLD-SCNC: 104 MMOL/L (ref 94–109)
CHLORIDE BLD-SCNC: 107 MMOL/L (ref 94–109)
CO2 SERPL-SCNC: 21 MMOL/L (ref 20–32)
CO2 SERPL-SCNC: 25 MMOL/L (ref 20–32)
CREAT SERPL-MCNC: 0.95 MG/DL (ref 0.52–1.04)
CREAT SERPL-MCNC: 1.09 MG/DL (ref 0.52–1.04)
ERYTHROCYTE [DISTWIDTH] IN BLOOD BY AUTOMATED COUNT: 15.7 % (ref 10–15)
GFR SERPL CREATININE-BSD FRML MDRD: 62 ML/MIN/1.73M2
GFR SERPL CREATININE-BSD FRML MDRD: 73 ML/MIN/1.73M2
GLUCOSE BLD-MCNC: 126 MG/DL (ref 70–99)
GLUCOSE BLD-MCNC: 130 MG/DL (ref 70–99)
GLUCOSE BLDC GLUCOMTR-MCNC: 117 MG/DL (ref 70–99)
GLUCOSE BLDC GLUCOMTR-MCNC: 129 MG/DL (ref 70–99)
GLUCOSE BLDC GLUCOMTR-MCNC: 145 MG/DL (ref 70–99)
GLUCOSE BLDC GLUCOMTR-MCNC: 180 MG/DL (ref 70–99)
GLUCOSE BLDC GLUCOMTR-MCNC: 211 MG/DL (ref 70–99)
GLUCOSE BLDC GLUCOMTR-MCNC: 274 MG/DL (ref 70–99)
HCT VFR BLD AUTO: 29.9 % (ref 35–47)
HGB BLD-MCNC: 9.4 G/DL (ref 11.7–15.7)
MAGNESIUM SERPL-MCNC: 2.3 MG/DL (ref 1.6–2.3)
MCH RBC QN AUTO: 30.6 PG (ref 26.5–33)
MCHC RBC AUTO-ENTMCNC: 31.4 G/DL (ref 31.5–36.5)
MCV RBC AUTO: 97 FL (ref 78–100)
PHOSPHATE SERPL-MCNC: 3.1 MG/DL (ref 2.5–4.5)
PLATELET # BLD AUTO: 366 10E3/UL (ref 150–450)
POTASSIUM BLD-SCNC: 5.8 MMOL/L (ref 3.4–5.3)
POTASSIUM BLD-SCNC: 5.8 MMOL/L (ref 3.4–5.3)
PROT SERPL-MCNC: 5.7 G/DL (ref 6.8–8.8)
RBC # BLD AUTO: 3.07 10E6/UL (ref 3.8–5.2)
SODIUM SERPL-SCNC: 134 MMOL/L (ref 133–144)
SODIUM SERPL-SCNC: 134 MMOL/L (ref 133–144)
TACROLIMUS BLD-MCNC: 9.7 UG/L (ref 5–15)
TME LAST DOSE: NORMAL H
TME LAST DOSE: NORMAL H
WBC # BLD AUTO: 5.6 10E3/UL (ref 4–11)

## 2022-11-14 PROCEDURE — 97530 THERAPEUTIC ACTIVITIES: CPT | Mod: GP | Performed by: PHYSICAL THERAPIST

## 2022-11-14 PROCEDURE — 82310 ASSAY OF CALCIUM: CPT | Performed by: NURSE PRACTITIONER

## 2022-11-14 PROCEDURE — 250N000011 HC RX IP 250 OP 636: Performed by: INTERNAL MEDICINE

## 2022-11-14 PROCEDURE — 80197 ASSAY OF TACROLIMUS: CPT | Performed by: NURSE PRACTITIONER

## 2022-11-14 PROCEDURE — 85027 COMPLETE CBC AUTOMATED: CPT | Performed by: NURSE PRACTITIONER

## 2022-11-14 PROCEDURE — 36415 COLL VENOUS BLD VENIPUNCTURE: CPT | Performed by: NURSE PRACTITIONER

## 2022-11-14 PROCEDURE — 258N000003 HC RX IP 258 OP 636: Performed by: INTERNAL MEDICINE

## 2022-11-14 PROCEDURE — 84100 ASSAY OF PHOSPHORUS: CPT | Performed by: NURSE PRACTITIONER

## 2022-11-14 PROCEDURE — 250N000013 HC RX MED GY IP 250 OP 250 PS 637: Performed by: INTERNAL MEDICINE

## 2022-11-14 PROCEDURE — 250N000012 HC RX MED GY IP 250 OP 636 PS 637: Performed by: INTERNAL MEDICINE

## 2022-11-14 PROCEDURE — 97535 SELF CARE MNGMENT TRAINING: CPT | Mod: GO | Performed by: OCCUPATIONAL THERAPIST

## 2022-11-14 PROCEDURE — 97750 PHYSICAL PERFORMANCE TEST: CPT | Mod: GP | Performed by: PHYSICAL THERAPIST

## 2022-11-14 PROCEDURE — 36415 COLL VENOUS BLD VENIPUNCTURE: CPT | Performed by: INTERNAL MEDICINE

## 2022-11-14 PROCEDURE — 120N000009 HC R&B SNF

## 2022-11-14 PROCEDURE — 83735 ASSAY OF MAGNESIUM: CPT | Performed by: NURSE PRACTITIONER

## 2022-11-14 RX ORDER — FUROSEMIDE 10 MG/ML
20 INJECTION INTRAMUSCULAR; INTRAVENOUS ONCE
Status: COMPLETED | OUTPATIENT
Start: 2022-11-14 | End: 2022-11-14

## 2022-11-14 RX ORDER — SODIUM POLYSTYRENE SULFONATE 15 G/60ML
30 SUSPENSION ORAL; RECTAL ONCE
Status: COMPLETED | OUTPATIENT
Start: 2022-11-14 | End: 2022-11-14

## 2022-11-14 RX ORDER — SODIUM CHLORIDE 9 MG/ML
INJECTION, SOLUTION INTRAVENOUS
Status: DISPENSED
Start: 2022-11-14 | End: 2022-11-15

## 2022-11-14 RX ORDER — LACTULOSE 10 G/15ML
20 SOLUTION ORAL 2 TIMES DAILY PRN
Status: DISCONTINUED | OUTPATIENT
Start: 2022-11-14 | End: 2022-11-17 | Stop reason: HOSPADM

## 2022-11-14 RX ADMIN — FUROSEMIDE 20 MG: 10 INJECTION, SOLUTION INTRAMUSCULAR; INTRAVENOUS at 12:11

## 2022-11-14 RX ADMIN — NYSTATIN 500000 UNITS: 100000 SUSPENSION ORAL at 21:12

## 2022-11-14 RX ADMIN — PANTOPRAZOLE SODIUM 40 MG: 40 TABLET, DELAYED RELEASE ORAL at 07:07

## 2022-11-14 RX ADMIN — HYDROMORPHONE HYDROCHLORIDE 2 MG: 2 TABLET ORAL at 23:42

## 2022-11-14 RX ADMIN — LIDOCAINE PATCH 4% 2 PATCH: 40 PATCH TOPICAL at 08:35

## 2022-11-14 RX ADMIN — MYCOPHENOLATE MOFETIL 750 MG: 250 CAPSULE ORAL at 18:42

## 2022-11-14 RX ADMIN — MYCOPHENOLATE MOFETIL 750 MG: 250 CAPSULE ORAL at 08:23

## 2022-11-14 RX ADMIN — HYDROMORPHONE HYDROCHLORIDE 2 MG: 2 TABLET ORAL at 17:17

## 2022-11-14 RX ADMIN — SULFAMETHOXAZOLE AND TRIMETHOPRIM 1 TABLET: 400; 80 TABLET ORAL at 08:25

## 2022-11-14 RX ADMIN — PANCRELIPASE 2 CAPSULE: 24000; 76000; 120000 CAPSULE, DELAYED RELEASE PELLETS ORAL at 18:41

## 2022-11-14 RX ADMIN — METHOCARBAMOL 500 MG: 500 TABLET ORAL at 08:25

## 2022-11-14 RX ADMIN — GABAPENTIN 200 MG: 100 CAPSULE ORAL at 21:12

## 2022-11-14 RX ADMIN — METHOCARBAMOL 500 MG: 500 TABLET ORAL at 17:17

## 2022-11-14 RX ADMIN — METHOCARBAMOL 500 MG: 500 TABLET ORAL at 13:31

## 2022-11-14 RX ADMIN — Medication 1 CAPSULE: at 13:31

## 2022-11-14 RX ADMIN — NYSTATIN 500000 UNITS: 100000 SUSPENSION ORAL at 08:23

## 2022-11-14 RX ADMIN — TACROLIMUS 6 MG: 5 CAPSULE ORAL at 08:23

## 2022-11-14 RX ADMIN — POLYETHYLENE GLYCOL 3350 17 G: 17 POWDER, FOR SOLUTION ORAL at 08:23

## 2022-11-14 RX ADMIN — PANCRELIPASE 2 CAPSULE: 24000; 76000; 120000 CAPSULE, DELAYED RELEASE PELLETS ORAL at 08:23

## 2022-11-14 RX ADMIN — NYSTATIN 500000 UNITS: 100000 SUSPENSION ORAL at 17:17

## 2022-11-14 RX ADMIN — MAGNESIUM OXIDE TAB 400 MG (241.3 MG ELEMENTAL MG) 400 MG: 400 (241.3 MG) TAB at 08:25

## 2022-11-14 RX ADMIN — ASPIRIN 81 MG CHEWABLE TABLET 81 MG: 81 TABLET CHEWABLE at 08:24

## 2022-11-14 RX ADMIN — NYSTATIN 500000 UNITS: 100000 SUSPENSION ORAL at 13:30

## 2022-11-14 RX ADMIN — VALGANCICLOVIR HYDROCHLORIDE 450 MG: 450 TABLET ORAL at 08:45

## 2022-11-14 RX ADMIN — TACROLIMUS 6 MG: 5 CAPSULE ORAL at 18:41

## 2022-11-14 RX ADMIN — ONDANSETRON 4 MG: 4 TABLET, ORALLY DISINTEGRATING ORAL at 21:20

## 2022-11-14 RX ADMIN — TRAZODONE HYDROCHLORIDE 50 MG: 50 TABLET ORAL at 23:42

## 2022-11-14 RX ADMIN — INSULIN ASPART 6 UNITS: 100 INJECTION, SOLUTION INTRAVENOUS; SUBCUTANEOUS at 08:31

## 2022-11-14 RX ADMIN — HYDROMORPHONE HYDROCHLORIDE 2 MG: 2 TABLET ORAL at 07:07

## 2022-11-14 RX ADMIN — HYDROXYZINE HYDROCHLORIDE 25 MG: 25 TABLET, FILM COATED ORAL at 21:20

## 2022-11-14 RX ADMIN — PANCRELIPASE 2 CAPSULE: 24000; 76000; 120000 CAPSULE, DELAYED RELEASE PELLETS ORAL at 13:30

## 2022-11-14 RX ADMIN — METHOCARBAMOL 500 MG: 500 TABLET ORAL at 21:12

## 2022-11-14 RX ADMIN — GABAPENTIN 200 MG: 100 CAPSULE ORAL at 08:24

## 2022-11-14 RX ADMIN — SENNOSIDES AND DOCUSATE SODIUM 2 TABLET: 50; 8.6 TABLET ORAL at 21:12

## 2022-11-14 RX ADMIN — INSULIN ASPART 7 UNITS: 100 INJECTION, SOLUTION INTRAVENOUS; SUBCUTANEOUS at 13:28

## 2022-11-14 RX ADMIN — SODIUM POLYSTYRENE SULFONATE 30 G: 15 SUSPENSION ORAL; RECTAL at 21:21

## 2022-11-14 RX ADMIN — TOPIRAMATE 50 MG: 50 TABLET ORAL at 08:24

## 2022-11-14 RX ADMIN — INSULIN GLARGINE 12 UNITS: 100 INJECTION, SOLUTION SUBCUTANEOUS at 08:29

## 2022-11-14 RX ADMIN — Medication 500 MG: at 08:24

## 2022-11-14 RX ADMIN — SODIUM CHLORIDE 500 ML: 9 INJECTION, SOLUTION INTRAVENOUS at 21:21

## 2022-11-14 RX ADMIN — SITAGLIPTIN 100 MG: 100 TABLET, FILM COATED ORAL at 08:24

## 2022-11-14 RX ADMIN — ESCITALOPRAM OXALATE 10 MG: 10 TABLET ORAL at 08:25

## 2022-11-14 RX ADMIN — SENNOSIDES AND DOCUSATE SODIUM 2 TABLET: 50; 8.6 TABLET ORAL at 08:25

## 2022-11-14 RX ADMIN — Medication 500 MG: at 18:42

## 2022-11-14 RX ADMIN — LEVOTHYROXINE SODIUM 150 MCG: 75 TABLET ORAL at 07:07

## 2022-11-14 ASSESSMENT — ACTIVITIES OF DAILY LIVING (ADL)
ADLS_ACUITY_SCORE: 31

## 2022-11-14 NOTE — PROGRESS NOTES
11/13/22 0815   Appointment Info   Signing Clinician's Name / Credentials (OT) Kaylyn Cohen, OTR/L,BIA   Rehab Comments (OT) OT: shower, morning adl routine and mobility   OT Goals   Therapy Frequency (OT) 6 times/wk   OT Predicted Duration/Target Date for Goal Attainment 11/16/22  (adjusted ELOS due to pt progressing faster than initially anticipated, goals updated 11/13/22)   OT: Hygiene/Grooming modified independent   OT: Upper Body Dressing Modified independent   OT: Lower Body Dressing Modified independent   OT: Upper Body Bathing Goal Met   OT: Lower Body Bathing Goal Met   OT: Bed Mobility Modified independent   OT: Transfer Modified independent   OT: Toilet Transfer/Toileting Modified independent   OT: Meal Preparation Supervision/stand-by assist   OT: Goal 1 Pt will complete tub tsfer w/ tub bench/shower chair transfer and grab bars w/ SBA.   Self-Care/Home Management   Self-Care/Home Mgmt/ADL, Compensatory, Meal Prep Minutes ([id number]) 45   Treatment Detail/Skilled Intervention OT: sba w/ ambulating in hallway w/ SEC, sba w/ donning doffing clothing full body, sat on shower bench w/ grabbars and sba only for washing /drying entire body.min cues for safer approach   OT Discharge Planning   OT Plan oT: abdom precautions, Rx: full body adls, activity saul, kitchen mob, assess for adapt equip needs   OT Discharge Recommendation (DC Rec) home with home care occupational therapy   OT Brief overview of current status OT: pt  saul therapy well, improivng w/ adls and mobility, reviewed and updated goals,  supportive and avail to assist w/ IADL at home per pt.   Total Session Time   Timed Code Treatment Minutes 45   Total Session Time (sum of timed and untimed services) 45   Post Acute Settings Only   What unit is patient on? TCU   Transfers: Sit to Stand   Describe Performance OT: sba, SEC   Transfers: Chair/Bed transfers   Describe Performance OT: sba, SEC   Ambulation   Describe Performance OT: sba, SEC  in room and hallway   Upper Body Dressing   Describe Performance oT: sba, gathered own clothing w/ SEC   Lower Body Dressing (Pants/Undergarments)   Describe Performance oT: sba, gathered own clothing w/ SEC   Bathing   Describe Performance OT: sba w/ grabbar, bench, hand held shower, cues for safer approach, no physical assist for wash/drying full body

## 2022-11-14 NOTE — PROGRESS NOTES
SW met with pt.  We called [facility] together.  Case number is [id number].  Pt will keep trying to get a hold of .  They transferred pt. and then phone hung up. We talked about the options for pt to get proof of disability.  Either the portal or go to social security.     SREEKANTH Salazar   [facility], Transitional Care Unit   Social Work   [address] [place] [address], [address] [place] [address] (ph) [phone]

## 2022-11-14 NOTE — PLAN OF CARE
Goal Outcome Evaluation:  A/O x 4, afebrile, denied SOB, pain manged with Dilaudid PRN x 1 given, effective, intermittent nausea, Zofran PO x 1 given.  at bedisde at the beginning of the shift, stayed ultil late supper,ate late supper, had takeaway food, unable to cover intake, no ticket came with meal, info about calorie per serving not availlable on the website also.  BG monitored,on sliding scale insulin and carb coverage. Incision to upper abdomen with intact staples, BLE with edema, tubigrip off at night . Independent in room with cane per PT.     Labs: K -5.8 (AM), scheduled K-phos tab currently on hold ,             Rechecked level: K- 5.8 (same at 1853H),     Susan Puckett, text paged provider   at 2027H  RM: [id number]  Hx: Liver Transplant  K-5.8 (AM),rechecked: 5.8 (1853H), K-phos on hold. Per transplant Surgery Notes: Consider Hold Bactrim or K Binder? No lab recheck for tomorrow also.Thanks!   Malika RN TCU  [phone]    New orders: 0.9 NS 500ml bolus x 1 and Kayexalate x1  given at 2121H                      Bactrim on hold                      BMP check 11/15 at 0600H     Patient's most recent vital signs are:     Vital signs:  BP: 95/58  Temp: 96.3  HR: 70  RR: 16  SpO2: 100 % /RA    Patient does not have new respiratory symptoms.  Patient does not have new sore throat.  Patient does not have a fever greater than 99.5.    0700H-1500H   Patient intermittently up and sleeping during the night, denied pain to incision site to upper abdomen, but said some pain around the abdomen and some back pain which she said probably from having added weight from edema on her thighs which makes it harder to carry her mid body weight. Dilaudid given ( last : 0531H), Zofran IV x 1 given at the same time per request ( requested IV at this time).   BMP sample drawn , K- 5.4 ( sticky note left to provider) . Call light within reach. To continue POC.

## 2022-11-14 NOTE — PLAN OF CARE
"Patient is alert and oriented x 4. She is able to make her needs known. Takes medications whole with thin liquids. Patient's affect is sad and mostly crying. Pt requested to continue on previous diuretic medications. Update provider, pt was given a one time dose of furosemide 20 mg IV Push infused over 3 minutes. Pt had a BM this afternoon. Morning potassium pill was held d/t high value. On BG monitoring, with sliding scale, and carbs count insulin. Pain assessment completed this afternoon. Patient requested to have a stronger pain medication. Sticky updated for provider to evaluate. Patient has two lidocaine patches on lower back, but stated that \" it is not helping the pain.\" Patient is in room in recliner with both legs elevated d/t edema. Call light and phone with in patient's reach. No chest pain or SOB reported this shift, will continue with POC.         Patient's most recent vital signs are:     Vital signs:  BP: 95/60  Temp: 96.8  HR: 71  RR: 16  SpO2: 96 %     Patient does not have new respiratory symptoms.  Patient does not have new sore throat.  Patient does not have a fever greater than 99.5.       Goal Outcome Evaluation:                        "

## 2022-11-14 NOTE — PLAN OF CARE
Goal Outcome Evaluation:    Patient AO, able to make needs known. Walks SBA with a cane. Continent of both B&B using the toilet, LBM 11/11. Patient continues to have pain on the abdominal incision site, prn dilaudid given X1 with good effect. Incision looks well approximated, staples on and TRACEY. BS was 179 at dinner time and 251 at bedtime, carb coverage and sliding scale insulin given per orders. Speech was clear today, no slurring or dysarthria noted. Denied CP, N/V or SOB. Patient has 2+ edema to the bilateral lower extremities, patient encouraged to keep feet elevated while in bed or recliner.   11pm: prn dilaudid given for incisional pain    Patient's most recent vital signs are:     Vital signs:  BP: 123/76  Temp: 96.6  HR: 71  RR: 18  SpO2: 100 %     Patient does not have new respiratory symptoms.  Patient does not have new sore throat.  Patient does not have a fever greater than 99.5.

## 2022-11-14 NOTE — PROGRESS NOTES
MDS Pain Assessment    The following is the pain interview as conducted by the TCU RN caring for the patient on November / 14 / 2022. This assessment is required by the [facility] for all patients in [place] SNF (Skilled Nursing Facilities).       1. Have you had pain or hurting at any time in the last 5 days? Yes    2. How much of the time have you experienced pain or hurting over the last 5 days? almost constantly    3. Over the past 5 days, has pain made it hard for you to sleep at night? No    4. Over the past 5 days, have you limited your day-to-day activities because of pain? No    5. Pain intensity (Numeric scale used first-if patient unable to answer, verbal scale to be used.)    Numeric Scale: Please rate your worst pain over the last 5 days on a zero to ten scale, with zero being no pain and ten being the worst pain you can imagine.     7-10    Verbal Scale: USED ONLY if unable to give numeric, otherwise N/A  Please rate the intensity of your worst pain over the last 5 days.     moderate-severe

## 2022-11-14 NOTE — PROGRESS NOTES
Transplant Surgery Immunosuppression Management Note:  51yo with history of CUETO cirrhosis s/p liver transplant 11/2/22, nurys-en-y gastric bypass, DM2, HTN, and hepatic hydrothorax.     Recommendations:  -Low K diet  -Ensure adequate hydration  -Consider briefly holding bactrim until K controlled  -Consider potassium binder    Tacrolimus: Tac level 9.7, goal level 8-12.  MMF: 750mg BID    Liver: , otherwise normal labs    Alexandra Diaz NP   Liver Transplant Surgery  NP/PA [id number] (Mon-Fri 6a-5p)  Fellow [phone]

## 2022-11-14 NOTE — PLAN OF CARE
Done.   Goal Outcome Evaluation:       Pt is alert and oriented x 4. Able to make needs known to staffs. Pt is continent of both bowel and bladder. Requires SBA with transfers. Pt denied having chest pain. SOB, N/V, fever and chills. Pt is particular with arranging pillows in bed. Slept through out the night. Appears comfortable at the time of this report. Will continue with POC.       Patient's most recent vital signs are:     Vital signs:  BP: 123/76  Temp: 96.6  HR: 71  RR: 18  SpO2: 100 %     Patient does not have new respiratory symptoms.  Patient does not have new sore throat.  Patient does not have a fever greater than 99.5.

## 2022-11-14 NOTE — PLAN OF CARE
Care Coordination:       Writer placed referral to following agencies, none were able to accept patient for home care.     [facility]: Unable to accept due to capacity.   [facility]: At capacity for payor, unable to accept   [facility]: at capacity for availability for nursing for lab draws.   [facility]: faxed to [phone]- referral pending; doesn't have a contract for commercial plans.   [facility]:faxed to  [phone]- referral pending.Unable to accept due to staffing.    [facility]: unable to accept patient due to payor   [facility]: No staffing availability for any disciplines.   [facility]: Left VM for return call- at capacity for payor .   [facility]: Patients residence is out of HC service area.   [facility]: Unable to accept referral they don't accept [facility] commercial plans.   [facility]: Left VM for return call.   [facility]: Unable to accept due to staffing shortage for PT/OT.   [facility]: Left VM for return call.   [facility]: PT/OT only- no nursing labs available, [phone] faxed referral for PT/OT. Have not heard back from agency.   [facility]: [facility] at this time. At capacity for community referrals.   [facility]: No taking any new refferrals until early December.   [facility]: Do not service patients living area.   [facility]: Left VM for return call.   [facility]: Do not accept [facility] commercial plans.   [facility]: Do not service patients living area.       Bhavana Latham   Patient Care Management Coordinator  Acute Rehabilitation Unit/ Transitional Care Unit.   Ph: [phone]

## 2022-11-15 ENCOUNTER — APPOINTMENT (OUTPATIENT)
Dept: PHYSICAL THERAPY | Facility: SKILLED NURSING FACILITY | Age: 52
DRG: 949 | End: 2022-11-15
Attending: INTERNAL MEDICINE
Payer: COMMERCIAL

## 2022-11-15 ENCOUNTER — DOCUMENTATION ONLY (OUTPATIENT)
Dept: OTHER | Facility: CLINIC | Age: 52
End: 2022-11-15

## 2022-11-15 ENCOUNTER — APPOINTMENT (OUTPATIENT)
Dept: OCCUPATIONAL THERAPY | Facility: SKILLED NURSING FACILITY | Age: 52
DRG: 949 | End: 2022-11-15
Attending: INTERNAL MEDICINE
Payer: COMMERCIAL

## 2022-11-15 LAB
ACHR BLOCK AB/ACHR TOTAL SFR SER: 21 %
ANION GAP SERPL CALCULATED.3IONS-SCNC: 5 MMOL/L (ref 3–14)
ANION GAP SERPL CALCULATED.3IONS-SCNC: 6 MMOL/L (ref 3–14)
BUN SERPL-MCNC: 16 MG/DL (ref 7–30)
BUN SERPL-MCNC: 17 MG/DL (ref 7–30)
CALCIUM SERPL-MCNC: 8.1 MG/DL (ref 8.5–10.1)
CALCIUM SERPL-MCNC: 8.6 MG/DL (ref 8.5–10.1)
CHLORIDE BLD-SCNC: 106 MMOL/L (ref 94–109)
CHLORIDE BLD-SCNC: 106 MMOL/L (ref 94–109)
CO2 SERPL-SCNC: 23 MMOL/L (ref 20–32)
CO2 SERPL-SCNC: 24 MMOL/L (ref 20–32)
CREAT SERPL-MCNC: 1.04 MG/DL (ref 0.52–1.04)
CREAT SERPL-MCNC: 1.08 MG/DL (ref 0.52–1.04)
GFR SERPL CREATININE-BSD FRML MDRD: 62 ML/MIN/1.73M2
GFR SERPL CREATININE-BSD FRML MDRD: 65 ML/MIN/1.73M2
GLUCOSE BLD-MCNC: 102 MG/DL (ref 70–99)
GLUCOSE BLD-MCNC: 157 MG/DL (ref 70–99)
GLUCOSE BLDC GLUCOMTR-MCNC: 119 MG/DL (ref 70–99)
GLUCOSE BLDC GLUCOMTR-MCNC: 126 MG/DL (ref 70–99)
GLUCOSE BLDC GLUCOMTR-MCNC: 132 MG/DL (ref 70–99)
GLUCOSE BLDC GLUCOMTR-MCNC: 141 MG/DL (ref 70–99)
GLUCOSE BLDC GLUCOMTR-MCNC: 271 MG/DL (ref 70–99)
POTASSIUM BLD-SCNC: 5.4 MMOL/L (ref 3.4–5.3)
POTASSIUM BLD-SCNC: 5.6 MMOL/L (ref 3.4–5.3)
SODIUM SERPL-SCNC: 135 MMOL/L (ref 133–144)
SODIUM SERPL-SCNC: 135 MMOL/L (ref 133–144)
STRIA MUS IGG SER QL IF: NORMAL

## 2022-11-15 PROCEDURE — 258N000003 HC RX IP 258 OP 636: Performed by: INTERNAL MEDICINE

## 2022-11-15 PROCEDURE — 120N000009 HC R&B SNF

## 2022-11-15 PROCEDURE — 36415 COLL VENOUS BLD VENIPUNCTURE: CPT | Performed by: INTERNAL MEDICINE

## 2022-11-15 PROCEDURE — 250N000013 HC RX MED GY IP 250 OP 250 PS 637: Performed by: INTERNAL MEDICINE

## 2022-11-15 PROCEDURE — 80048 BASIC METABOLIC PNL TOTAL CA: CPT | Performed by: INTERNAL MEDICINE

## 2022-11-15 PROCEDURE — 97535 SELF CARE MNGMENT TRAINING: CPT | Mod: GO

## 2022-11-15 PROCEDURE — 250N000011 HC RX IP 250 OP 636: Performed by: INTERNAL MEDICINE

## 2022-11-15 PROCEDURE — 36416 COLLJ CAPILLARY BLOOD SPEC: CPT | Performed by: INTERNAL MEDICINE

## 2022-11-15 PROCEDURE — 250N000012 HC RX MED GY IP 250 OP 636 PS 637: Performed by: INTERNAL MEDICINE

## 2022-11-15 PROCEDURE — 97110 THERAPEUTIC EXERCISES: CPT | Mod: GP

## 2022-11-15 RX ORDER — FUROSEMIDE 10 MG/ML
20 INJECTION INTRAMUSCULAR; INTRAVENOUS ONCE
Status: COMPLETED | OUTPATIENT
Start: 2022-11-15 | End: 2022-11-15

## 2022-11-15 RX ORDER — SODIUM CHLORIDE 9 MG/ML
INJECTION, SOLUTION INTRAVENOUS
Status: DISPENSED
Start: 2022-11-15 | End: 2022-11-15

## 2022-11-15 RX ORDER — SODIUM POLYSTYRENE SULFONATE 15 G/60ML
30 SUSPENSION ORAL; RECTAL ONCE
Status: COMPLETED | OUTPATIENT
Start: 2022-11-15 | End: 2022-11-15

## 2022-11-15 RX ADMIN — TOPIRAMATE 50 MG: 50 TABLET ORAL at 08:45

## 2022-11-15 RX ADMIN — HYDROXYZINE HYDROCHLORIDE 50 MG: 25 TABLET, FILM COATED ORAL at 18:34

## 2022-11-15 RX ADMIN — NYSTATIN 500000 UNITS: 100000 SUSPENSION ORAL at 21:44

## 2022-11-15 RX ADMIN — MAGNESIUM OXIDE TAB 400 MG (241.3 MG ELEMENTAL MG) 400 MG: 400 (241.3 MG) TAB at 08:45

## 2022-11-15 RX ADMIN — METHOCARBAMOL 500 MG: 500 TABLET ORAL at 18:25

## 2022-11-15 RX ADMIN — HYDROMORPHONE HYDROCHLORIDE 2 MG: 2 TABLET ORAL at 05:31

## 2022-11-15 RX ADMIN — MYCOPHENOLATE MOFETIL 750 MG: 250 CAPSULE ORAL at 18:25

## 2022-11-15 RX ADMIN — METHOCARBAMOL 500 MG: 500 TABLET ORAL at 12:03

## 2022-11-15 RX ADMIN — METHOCARBAMOL 500 MG: 500 TABLET ORAL at 21:44

## 2022-11-15 RX ADMIN — Medication 1 CAPSULE: at 12:03

## 2022-11-15 RX ADMIN — NYSTATIN 500000 UNITS: 100000 SUSPENSION ORAL at 08:44

## 2022-11-15 RX ADMIN — SITAGLIPTIN 100 MG: 100 TABLET, FILM COATED ORAL at 08:44

## 2022-11-15 RX ADMIN — NYSTATIN 500000 UNITS: 100000 SUSPENSION ORAL at 12:03

## 2022-11-15 RX ADMIN — Medication 500 MG: at 08:45

## 2022-11-15 RX ADMIN — GABAPENTIN 200 MG: 100 CAPSULE ORAL at 21:44

## 2022-11-15 RX ADMIN — SENNOSIDES AND DOCUSATE SODIUM 2 TABLET: 50; 8.6 TABLET ORAL at 08:43

## 2022-11-15 RX ADMIN — NYSTATIN 500000 UNITS: 100000 SUSPENSION ORAL at 18:24

## 2022-11-15 RX ADMIN — HYDROMORPHONE HYDROCHLORIDE 2 MG: 2 TABLET ORAL at 18:34

## 2022-11-15 RX ADMIN — PANTOPRAZOLE SODIUM 40 MG: 40 TABLET, DELAYED RELEASE ORAL at 08:46

## 2022-11-15 RX ADMIN — SODIUM POLYSTYRENE SULFONATE 30 G: 15 SUSPENSION ORAL; RECTAL at 23:12

## 2022-11-15 RX ADMIN — POLYETHYLENE GLYCOL 3350 17 G: 17 POWDER, FOR SOLUTION ORAL at 08:47

## 2022-11-15 RX ADMIN — TRAZODONE HYDROCHLORIDE 50 MG: 50 TABLET ORAL at 23:12

## 2022-11-15 RX ADMIN — MYCOPHENOLATE MOFETIL 750 MG: 250 CAPSULE ORAL at 08:44

## 2022-11-15 RX ADMIN — GABAPENTIN 200 MG: 100 CAPSULE ORAL at 08:44

## 2022-11-15 RX ADMIN — Medication 500 MG: at 18:25

## 2022-11-15 RX ADMIN — PANCRELIPASE 2 CAPSULE: 24000; 76000; 120000 CAPSULE, DELAYED RELEASE PELLETS ORAL at 18:24

## 2022-11-15 RX ADMIN — LIDOCAINE PATCH 4% 2 PATCH: 40 PATCH TOPICAL at 08:46

## 2022-11-15 RX ADMIN — SODIUM ZIRCONIUM CYCLOSILICATE 10 G: 5 POWDER, FOR SUSPENSION ORAL at 10:42

## 2022-11-15 RX ADMIN — ONDANSETRON 4 MG: 2 INJECTION INTRAMUSCULAR; INTRAVENOUS at 05:31

## 2022-11-15 RX ADMIN — TACROLIMUS 6 MG: 5 CAPSULE ORAL at 08:46

## 2022-11-15 RX ADMIN — PANCRELIPASE 2 CAPSULE: 24000; 76000; 120000 CAPSULE, DELAYED RELEASE PELLETS ORAL at 08:58

## 2022-11-15 RX ADMIN — INSULIN ASPART 2 UNITS: 100 INJECTION, SOLUTION INTRAVENOUS; SUBCUTANEOUS at 10:40

## 2022-11-15 RX ADMIN — SODIUM CHLORIDE 500 ML: 9 INJECTION, SOLUTION INTRAVENOUS at 08:42

## 2022-11-15 RX ADMIN — INSULIN GLARGINE 12 UNITS: 100 INJECTION, SOLUTION SUBCUTANEOUS at 08:55

## 2022-11-15 RX ADMIN — HYDROMORPHONE HYDROCHLORIDE 2 MG: 2 TABLET ORAL at 12:03

## 2022-11-15 RX ADMIN — TACROLIMUS 6 MG: 5 CAPSULE ORAL at 18:25

## 2022-11-15 RX ADMIN — LEVOTHYROXINE SODIUM 150 MCG: 75 TABLET ORAL at 08:45

## 2022-11-15 RX ADMIN — FUROSEMIDE 20 MG: 10 INJECTION, SOLUTION INTRAMUSCULAR; INTRAVENOUS at 10:41

## 2022-11-15 RX ADMIN — HYDROXYZINE HYDROCHLORIDE 50 MG: 25 TABLET, FILM COATED ORAL at 10:41

## 2022-11-15 RX ADMIN — METHOCARBAMOL 500 MG: 500 TABLET ORAL at 08:45

## 2022-11-15 RX ADMIN — SENNOSIDES AND DOCUSATE SODIUM 2 TABLET: 50; 8.6 TABLET ORAL at 21:44

## 2022-11-15 RX ADMIN — SIMETHICONE 80 MG: 80 TABLET, CHEWABLE ORAL at 18:34

## 2022-11-15 RX ADMIN — ASPIRIN 81 MG CHEWABLE TABLET 81 MG: 81 TABLET CHEWABLE at 08:46

## 2022-11-15 RX ADMIN — ESCITALOPRAM OXALATE 10 MG: 10 TABLET ORAL at 08:45

## 2022-11-15 RX ADMIN — VALGANCICLOVIR HYDROCHLORIDE 450 MG: 450 TABLET ORAL at 08:44

## 2022-11-15 ASSESSMENT — ACTIVITIES OF DAILY LIVING (ADL)
ADLS_ACUITY_SCORE: 31
ADLS_ACUITY_SCORE: 30
ADLS_ACUITY_SCORE: 31
ADLS_ACUITY_SCORE: 30
ADLS_ACUITY_SCORE: 31

## 2022-11-15 NOTE — PROGRESS NOTES
RN: follow-up K 5.8.   Hold Bactrim. Hold K supplement. Low k 2gm diet.   0.9 bolus 500 ml iv x 1  Kayexalate 30 gm po x 1  Follow-up bmp in am  Encourage oral fluids.   tom RN.     Cross Cover.

## 2022-11-15 NOTE — PROGRESS NOTES
SW met pt and PT in [facility].  We talked about discharge.  Pt said labs are not normal.  Pt will stay longer.  PT said yes, they aren't discharge today. Pt will stay longer.     SW answered light and pt and  wanted to see the doctor.  The doctor went home.  Pt was upset that discharge was going to be on the 17th.  SW said we need to find HHA. None of the orders were done.  said 11 am on the 17th he will  pt.       SREEKANTH Salazar   [facility], Transitional Care Unit   Social Work   [facility] [place] [address], 4th [place] [address]  (PH) [phone]

## 2022-11-15 NOTE — PROGRESS NOTES
Alert and oriented times four. Able to use call light and make needs known. Low potassium diet. PRN Dilaudid given at 1200. Pain rated 8 out of 10. Takes pills whole with thin liquids.  and . No sliding scale. 500ml IV 0.9% sodium chloride BOLUS given at 1000. Two lidocaine patches on back. Crying this afternoon from pain. Requested that nurse use back massager for a bit. Nurse massaged back for ten minutes and patient calmed down.

## 2022-11-16 ENCOUNTER — TELEPHONE (OUTPATIENT)
Dept: TRANSPLANT | Facility: CLINIC | Age: 52
End: 2022-11-16

## 2022-11-16 ENCOUNTER — APPOINTMENT (OUTPATIENT)
Dept: PHYSICAL THERAPY | Facility: SKILLED NURSING FACILITY | Age: 52
DRG: 949 | End: 2022-11-16
Attending: INTERNAL MEDICINE
Payer: COMMERCIAL

## 2022-11-16 LAB
ACHR MOD AB/ACHR TOTAL SFR SER: 0 %
ANION GAP SERPL CALCULATED.3IONS-SCNC: 4 MMOL/L (ref 3–14)
BUN SERPL-MCNC: 13 MG/DL (ref 7–30)
CALCIUM SERPL-MCNC: 8.2 MG/DL (ref 8.5–10.1)
CHLORIDE BLD-SCNC: 107 MMOL/L (ref 94–109)
CO2 SERPL-SCNC: 24 MMOL/L (ref 20–32)
CREAT SERPL-MCNC: 1.12 MG/DL (ref 0.52–1.04)
GFR SERPL CREATININE-BSD FRML MDRD: 60 ML/MIN/1.73M2
GLUCOSE BLD-MCNC: 149 MG/DL (ref 70–99)
GLUCOSE BLDC GLUCOMTR-MCNC: 136 MG/DL (ref 70–99)
GLUCOSE BLDC GLUCOMTR-MCNC: 137 MG/DL (ref 70–99)
GLUCOSE BLDC GLUCOMTR-MCNC: 148 MG/DL (ref 70–99)
GLUCOSE BLDC GLUCOMTR-MCNC: 158 MG/DL (ref 70–99)
GLUCOSE BLDC GLUCOMTR-MCNC: 160 MG/DL (ref 70–99)
PATH REPORT.ADDENDUM SPEC: NORMAL
PATH REPORT.COMMENTS IMP SPEC: NORMAL
PATH REPORT.COMMENTS IMP SPEC: NORMAL
PATH REPORT.FINAL DX SPEC: NORMAL
PATH REPORT.GROSS SPEC: NORMAL
PATH REPORT.MICROSCOPIC SPEC OTHER STN: NORMAL
PATH REPORT.RELEVANT HX SPEC: NORMAL
PHOTO IMAGE: NORMAL
POTASSIUM BLD-SCNC: 5.1 MMOL/L (ref 3.4–5.3)
SODIUM SERPL-SCNC: 135 MMOL/L (ref 133–144)

## 2022-11-16 PROCEDURE — 97530 THERAPEUTIC ACTIVITIES: CPT | Mod: GP

## 2022-11-16 PROCEDURE — 120N000009 HC R&B SNF

## 2022-11-16 PROCEDURE — 99307 SBSQ NF CARE SF MDM 10: CPT | Performed by: INTERNAL MEDICINE

## 2022-11-16 PROCEDURE — 250N000011 HC RX IP 250 OP 636: Performed by: INTERNAL MEDICINE

## 2022-11-16 PROCEDURE — 250N000013 HC RX MED GY IP 250 OP 250 PS 637: Performed by: INTERNAL MEDICINE

## 2022-11-16 PROCEDURE — 82955 ASSAY OF G6PD ENZYME: CPT | Performed by: NURSE PRACTITIONER

## 2022-11-16 PROCEDURE — 80048 BASIC METABOLIC PNL TOTAL CA: CPT | Performed by: INTERNAL MEDICINE

## 2022-11-16 PROCEDURE — 250N000012 HC RX MED GY IP 250 OP 636 PS 637: Performed by: INTERNAL MEDICINE

## 2022-11-16 PROCEDURE — 36415 COLL VENOUS BLD VENIPUNCTURE: CPT | Performed by: INTERNAL MEDICINE

## 2022-11-16 PROCEDURE — 36415 COLL VENOUS BLD VENIPUNCTURE: CPT | Performed by: NURSE PRACTITIONER

## 2022-11-16 RX ORDER — GABAPENTIN 100 MG/1
200 CAPSULE ORAL 2 TIMES DAILY
Qty: 120 CAPSULE | Refills: 0 | Status: SHIPPED | OUTPATIENT
Start: 2022-11-16 | End: 2022-11-18

## 2022-11-16 RX ORDER — ASPIRIN 81 MG/1
81 TABLET, CHEWABLE ORAL DAILY
Qty: 30 TABLET | Refills: 0 | Status: SHIPPED | OUTPATIENT
Start: 2022-11-16 | End: 2022-11-18

## 2022-11-16 RX ORDER — VALGANCICLOVIR 450 MG/1
450 TABLET, FILM COATED ORAL DAILY
Qty: 30 TABLET | Refills: 0 | Status: SHIPPED | OUTPATIENT
Start: 2022-11-16 | End: 2022-11-18

## 2022-11-16 RX ORDER — MYCOPHENOLATE MOFETIL 250 MG/1
750 CAPSULE ORAL
Qty: 180 CAPSULE | Refills: 0 | Status: SHIPPED | OUTPATIENT
Start: 2022-11-17 | End: 2022-11-18

## 2022-11-16 RX ORDER — DAPSONE 25 MG/1
50 TABLET ORAL DAILY
Qty: 60 TABLET | Refills: 0 | Status: ON HOLD | OUTPATIENT
Start: 2022-11-17 | End: 2022-12-06

## 2022-11-16 RX ORDER — NYSTATIN 100000/ML
500000 SUSPENSION, ORAL (FINAL DOSE FORM) ORAL 4 TIMES DAILY
Qty: 600 ML | Refills: 0 | Status: ON HOLD | OUTPATIENT
Start: 2022-11-16 | End: 2022-11-21

## 2022-11-16 RX ORDER — TACROLIMUS 1 MG/1
6 CAPSULE ORAL
Qty: 360 CAPSULE | Refills: 0 | Status: SHIPPED | OUTPATIENT
Start: 2022-11-17 | End: 2022-11-18

## 2022-11-16 RX ORDER — DAPSONE 25 MG/1
50 TABLET ORAL DAILY
Status: DISCONTINUED | OUTPATIENT
Start: 2022-11-17 | End: 2022-11-17 | Stop reason: HOSPADM

## 2022-11-16 RX ORDER — INSULIN ASPART 100 [IU]/ML
1-10 INJECTION, SOLUTION INTRAVENOUS; SUBCUTANEOUS
Start: 2022-11-16 | End: 2023-03-02 | Stop reason: ALTCHOICE

## 2022-11-16 RX ORDER — RIZATRIPTAN BENZOATE 10 MG/1
10 TABLET ORAL
Status: COMPLETED | OUTPATIENT
Start: 2022-11-16 | End: 2022-11-16

## 2022-11-16 RX ADMIN — NYSTATIN 500000 UNITS: 100000 SUSPENSION ORAL at 08:29

## 2022-11-16 RX ADMIN — PANCRELIPASE 2 CAPSULE: 24000; 76000; 120000 CAPSULE, DELAYED RELEASE PELLETS ORAL at 18:08

## 2022-11-16 RX ADMIN — LIDOCAINE PATCH 4% 2 PATCH: 40 PATCH TOPICAL at 08:30

## 2022-11-16 RX ADMIN — METHOCARBAMOL 500 MG: 500 TABLET ORAL at 14:40

## 2022-11-16 RX ADMIN — INSULIN ASPART 3 UNITS: 100 INJECTION, SOLUTION INTRAVENOUS; SUBCUTANEOUS at 20:43

## 2022-11-16 RX ADMIN — GABAPENTIN 200 MG: 100 CAPSULE ORAL at 20:44

## 2022-11-16 RX ADMIN — PANTOPRAZOLE SODIUM 40 MG: 40 TABLET, DELAYED RELEASE ORAL at 06:40

## 2022-11-16 RX ADMIN — HYDROXYZINE HYDROCHLORIDE 50 MG: 25 TABLET, FILM COATED ORAL at 18:00

## 2022-11-16 RX ADMIN — NYSTATIN 500000 UNITS: 100000 SUSPENSION ORAL at 17:52

## 2022-11-16 RX ADMIN — RIZATRIPTAN BENZOATE 10 MG: 10 TABLET ORAL at 09:50

## 2022-11-16 RX ADMIN — GABAPENTIN 200 MG: 100 CAPSULE ORAL at 08:29

## 2022-11-16 RX ADMIN — SODIUM ZIRCONIUM CYCLOSILICATE 10 G: 5 POWDER, FOR SUSPENSION ORAL at 22:49

## 2022-11-16 RX ADMIN — METHOCARBAMOL 500 MG: 500 TABLET ORAL at 17:53

## 2022-11-16 RX ADMIN — MYCOPHENOLATE MOFETIL 750 MG: 250 CAPSULE ORAL at 17:52

## 2022-11-16 RX ADMIN — ASPIRIN 81 MG CHEWABLE TABLET 81 MG: 81 TABLET CHEWABLE at 09:54

## 2022-11-16 RX ADMIN — HYDROXYZINE HYDROCHLORIDE 50 MG: 25 TABLET, FILM COATED ORAL at 06:40

## 2022-11-16 RX ADMIN — SODIUM ZIRCONIUM CYCLOSILICATE 10 G: 5 POWDER, FOR SUSPENSION ORAL at 12:16

## 2022-11-16 RX ADMIN — TACROLIMUS 6 MG: 5 CAPSULE ORAL at 08:28

## 2022-11-16 RX ADMIN — LEVOTHYROXINE SODIUM 150 MCG: 75 TABLET ORAL at 06:40

## 2022-11-16 RX ADMIN — MYCOPHENOLATE MOFETIL 750 MG: 250 CAPSULE ORAL at 08:29

## 2022-11-16 RX ADMIN — SIMETHICONE 80 MG: 80 TABLET, CHEWABLE ORAL at 18:00

## 2022-11-16 RX ADMIN — HYDROMORPHONE HYDROCHLORIDE 2 MG: 2 TABLET ORAL at 18:00

## 2022-11-16 RX ADMIN — SIMETHICONE 80 MG: 80 TABLET, CHEWABLE ORAL at 04:06

## 2022-11-16 RX ADMIN — PANCRELIPASE 2 CAPSULE: 24000; 76000; 120000 CAPSULE, DELAYED RELEASE PELLETS ORAL at 14:41

## 2022-11-16 RX ADMIN — SITAGLIPTIN 100 MG: 100 TABLET, FILM COATED ORAL at 08:29

## 2022-11-16 RX ADMIN — ONDANSETRON 4 MG: 4 TABLET, ORALLY DISINTEGRATING ORAL at 10:30

## 2022-11-16 RX ADMIN — METHOCARBAMOL 500 MG: 500 TABLET ORAL at 08:28

## 2022-11-16 RX ADMIN — Medication 1 CAPSULE: at 14:41

## 2022-11-16 RX ADMIN — INSULIN ASPART 4 UNITS: 100 INJECTION, SOLUTION INTRAVENOUS; SUBCUTANEOUS at 14:47

## 2022-11-16 RX ADMIN — PANCRELIPASE 2 CAPSULE: 24000; 76000; 120000 CAPSULE, DELAYED RELEASE PELLETS ORAL at 09:54

## 2022-11-16 RX ADMIN — INSULIN GLARGINE 12 UNITS: 100 INJECTION, SOLUTION SUBCUTANEOUS at 09:57

## 2022-11-16 RX ADMIN — METHOCARBAMOL 500 MG: 500 TABLET ORAL at 20:45

## 2022-11-16 RX ADMIN — SENNOSIDES AND DOCUSATE SODIUM 2 TABLET: 50; 8.6 TABLET ORAL at 08:27

## 2022-11-16 RX ADMIN — TACROLIMUS 6 MG: 5 CAPSULE ORAL at 17:52

## 2022-11-16 RX ADMIN — HYDROMORPHONE HYDROCHLORIDE 2 MG: 2 TABLET ORAL at 04:06

## 2022-11-16 RX ADMIN — POLYETHYLENE GLYCOL 3350 17 G: 17 POWDER, FOR SOLUTION ORAL at 08:30

## 2022-11-16 RX ADMIN — NYSTATIN 500000 UNITS: 100000 SUSPENSION ORAL at 20:44

## 2022-11-16 RX ADMIN — HYDROMORPHONE HYDROCHLORIDE 2 MG: 2 TABLET ORAL at 10:24

## 2022-11-16 RX ADMIN — VALGANCICLOVIR HYDROCHLORIDE 450 MG: 450 TABLET ORAL at 08:30

## 2022-11-16 RX ADMIN — SENNOSIDES AND DOCUSATE SODIUM 2 TABLET: 50; 8.6 TABLET ORAL at 20:44

## 2022-11-16 RX ADMIN — TOPIRAMATE 50 MG: 50 TABLET ORAL at 08:28

## 2022-11-16 RX ADMIN — ESCITALOPRAM OXALATE 10 MG: 10 TABLET ORAL at 08:29

## 2022-11-16 RX ADMIN — INSULIN ASPART 8 UNITS: 100 INJECTION, SOLUTION INTRAVENOUS; SUBCUTANEOUS at 09:59

## 2022-11-16 RX ADMIN — NYSTATIN 500000 UNITS: 100000 SUSPENSION ORAL at 14:40

## 2022-11-16 RX ADMIN — SIMETHICONE 80 MG: 80 TABLET, CHEWABLE ORAL at 10:30

## 2022-11-16 ASSESSMENT — ACTIVITIES OF DAILY LIVING (ADL)
ADLS_ACUITY_SCORE: 30

## 2022-11-16 NOTE — PLAN OF CARE
Goal Outcome Evaluation:       Overall no acute change noted this shift. Complained of pain and dilaudid administered. Verbalized effectiveness. No quiet for this Pt. Appeared sleeping during safety rounds. No complain at this time. Will continue with POC.      Patient's most recent vital signs are:     Vital signs:  BP: 117/69  Temp: 98.2  HR: 71  RR: 18  SpO2: 98 %     Patient does not have new respiratory symptoms.  Patient does not have new sore throat.  Patient does not have a fever greater than 99.5.

## 2022-11-16 NOTE — TELEPHONE ENCOUNTER
Spoke to Rivera. Briefly went over appt times for next week. Discharge instructions sent to preferred email address. Tentative plan for Susan to discharge from TCU tomorrow with home care. Went over how to reach me via 1bib or calling the transplant office. I let him know that we will be in contact after Susan is discharged.     Rivera has no further questions at this time.

## 2022-11-16 NOTE — PLAN OF CARE
Goal Outcome Evaluation:  Pt is alert and oriented x 4, she was lethargic and sleepy in the morning and got better as the day went on. She has ongoing migraine headache, she had MAXALT 10 MG and DILAUDID 2 mg once this shift. She said it was helping with the pain. Abdominal wound is open to air, staples are intact. She is independent in the room using the cane but she calls to go to the bath room, stand by assist. We will continue to monitor pain, surgical incision and encourage independence while assisting with activity of daily livings as needed.

## 2022-11-16 NOTE — PROGRESS NOTES
Status at Admission - 11/11 Current Status - 11/15   Bed Mobility   Mod A   IND   Transfer   CGA with FWW   IND with FWW   Gait   300 ft with SEC, SBA   Unchanged   Stairs   4 stairs up/down, SBA   12 stairs up/down, SBA   Outcomes Measures   Not yet tested   MCGEE 47/56         Assessment of Progress Since Last Update: Pt has made good improvement in functional ability since admission as detailed above. Pt is now moving about her in room with IND using FWW and has significantly improved endurance with stair training.    Barriers to Progress: None at this time, on track for discharge   Justification for Continued Therapy Services: Pt's last day of PT is planned for 11/16, on track for discharge, will ensure comfort with HEP and equipment prior to discharge   Additional Considerations for Safe and Efficient Discharge: None at this time, plan for home PT services, if none available care coordinator to discuss with pt.

## 2022-11-16 NOTE — PROGRESS NOTES
[facility] Transitional Care    Medicine Progress Note - Hospitalist Service    Date of Admission:  11/10/2022    Assessment & Plan     Susan Puckett is a 52-year-old female with a history of nonalcoholic steatohepatitis complicated by cirrhosis s/p TIPS with history of cirrhosis related complications including pleural effusion, ascites, hepatic encephalopathy, hepatic hydrothorax s/p multiple thoracenteses.  Patient also has a history of anemia, insulin-dependent diabetes, GERD hypothyroidism, depression, anxiety, migraines, HSV infection. Patient is s/p Faustino-en-Y gastric bypass.  Patient presented [facility] on 11/1/2022 for orthotopic liver transplant on 11/2/2022 with Dr. Delbert Morgan.  Surgery was largely uncomplicated.  Hospital course complicated by multiple metabolic derangements including hypokalemia, hyponatremia, hypophosphatemia all of which were addressed adequately.  Patient also had acute blood loss anemia but hemoglobin remained stable postop.  There was noted elevated LFTs postop which improved during course of hospital stay.  Patient was discharged to TCU on 11/10/2022 for ongoing medical management and rehabilitation. Course here in the TCU has been notable for acute onset dysarthria which has since improved significantly.            #History of decompensated cirrhosis  #S/p orthotopic liver transplant on 11/2/2022 with Dr. Vizcaino  Ultrasound abdomen on 11/4 shows persistence of elevated resistive indicis in main and right hepatic arteries, with slightly improved flow in left hepatic artery.  Portal veins noted to be patent with appropriate directional flow.    Immunosuppression  1. completed steroid taper on 11/07  2. Tacrolimus 6 mg BID (goal 8 - 12)  - Last tacro level 11/10 => 10.5  - Next draw 11/14 => 9.7   - Tacro level qTue& Thurs  3. Mycophenolate 750 mg bid    Infection ppx  ? Bactrim --> Dapsone 50 mg qd  x6 months  ? F/up G6PD  ? valganciclovir x12 weeks (11/03/22 -  03/03/23)    Anticoagulation with ASA x6 months    Cont to monitor LFTs       #Hyperkalemia, mild  Likely iatrogenic as patient is on Calcineurin inhibitor & had also been on Bactrim    Discontinue bactrim & start dapsone instead    Lokelma 10 g standing for now    continue to monitor BMP check for now      #Dysarthria - resolved  Onset was evening of 11/10. On 11/11 code stroke called with CT imaging showing no evidence of stroke. MRI brain w&w/o IV contrast also unremarkable.  Dysathria has since improved significantly since admission.  Neurology saw patient on 11/12 recommending obtaining myasthenia panel    Etiology unclear but likely combination of metabolic derangement, increased stress, medication side effect  -Patient seen and cleared by speech on 11/12  -Myasthenia panel has resulted negative; still pending Acetylcholine modulating antibody    Follow-up remaining results of the panel    Correct electrolyte abnormalities as below     #Postop pain    PO dilaudid 2mg Q6h prn Robaxin 500mg QID, Atarax PRN. add lidocaine patches.     #Microcytic anemia, chronic - baseline hgb ~9.0  #Thrombocytopenia, chronic - likely due to hx of cirrhosis   -no evidence of bleed at this time    Cont to monitor     #GEORGETTE-resolved  -likely pre-renal from diuresis in an attempt to correct hyperK  -ctm     #Hyponatremia-improving  #Hypokalemia-resolved  #Hypocalcemia-improving  #Hypophosphatemia-improving    Replete above electrolytes and continue to monitor    Cont ca++ supplementation      #Hypoalbuminemia-improving    Nutrition consult    Cont to monitor     #History of Faustino-en-Y gastric bypass  #Pancreatic insufficiency    Dietary consult    Creon [id number] with meals and snacks        #Insulin-dependent diabetes  A1c 5.7 on 11/02    Continue Lantus 12 units every morning    NovoLog high insulin resistance with meals    Add PTA Januvia 100 mg every day      #Hypothyroidism    PTA levothyroxine 150 mcg     #Depression,  #anxiety    Continue PTA Lexapro 10 mg daily    Trazodone nightly 50 mg.     #History of migraine    Cont PTA topiramate 50 mg daily     #History of seizure  First at the age of 15 after staying up all night while volunteering; second was following hypoglycemic episode.     Immunization:   Immunization History   Administered Date(s) Administered     COVID-19,PF,Pfizer (12+ Yrs) 03/11/2021, 04/03/2021, 09/07/2021     FLU 6-35 months 10/15/2019     HepB-Adult 10/24/2006, 12/04/2006, 04/27/2007     Influenza Vaccine IM > 6 months Valent IIV4 (Alfuria,Fluzone) 12/13/2018, 10/15/2019, 10/05/2020, 09/28/2021     Mantoux Tuberculin Skin Test 11/12/2022     Pneumococcal 23 valent 11/22/2019     TD (ADULT, 7+) 09/20/2002     TDAP Vaccine (Boostrix) 10/03/2012     Twinrix A/B 10/31/2019, 10/31/2019, 08/11/2020, 08/11/2020, 02/04/2021     Indication for psychotropic medications:   - Escitalopram 10 mg for depression  - Gabapentin 200 mg twice daily for neuropathic pain  - Topiramate 50 mg daily for migraine prophylaxis  - Trazodone 50 mg nightly, for insomnia, depression  - Atarax as needed for pain, anxiety  - Dilaudid p.o. for acute postop pain    Is/Os: No intake or output data in the 24 hours ending 11/13/22 0719  Length of stay: 6  Diet: Snacks/Supplements Adult: Ensure Clear; With Meals  2 Gram K Diet    DVT Prophylaxis:  mg   Rossi Catheter: Not present  Central Lines: None  Cardiac Monitoring: None  Code Status: Full Code      Disposition Plan      Expected Discharge Date: 11/17/2022, 11:00 AM    Destination: home with family (Spouse)          The patient's care was discussed with the Patient.    ERIKA MARTÍNEZ MD  Hospitalist Service  [facility] Transitional Care  Securely message with the Vocera Web Console (learn more here)  Text page via [employer] Paging/Directory         Clinically Significant Risk Factors        # Hyperkalemia: Highest K = 5.8 mmol/L (Ref range: 3.4-5.3) in last 2 days, will monitor as  "appropriate  # Hyponatremia: Lowest Na = 134 mmol/L (Ref range: 136-145) in last 2 days, will monitor as appropriate      # Hypoalbuminemia: Lowest albumin = 2.2 g/dL (Ref range: 3.5-5.2) at 11/12/2022  7:18 AM, will monitor as appropriate          # Obesity: Estimated body mass index is 32.35 kg/m  as calculated from the following:    Height as of 11/2/22: 1.676 m (5' 6\").    Weight as of this encounter: 90.9 kg (200 lb 6.4 oz).          ______________________________________________________________________    Interval History   NAEON  Asking for migraine medication today        Data reviewed today: I reviewed all medications, new labs and imaging results over the last 24 hours.      Physical Exam   Vital Signs: Temp: 98.2  F (36.8  C) Temp src: Oral BP: 117/69 Pulse: 71   Resp: 18 SpO2: 98 % O2 Device: None (Room air)    Weight: 200 lbs 6.4 oz      General appearance: Alert, in no acute distress and Ox3   HEENT: Normocephalic, atraumatic; Pupils are equal, round and react to light; Extraocular   movements intact; Conjuctivae are Normal; Mucous membranes moist and without lesions   Pulm: clear to ausculation bilaterally, no wheeze, rales or rhonchi   CVS: RRR, no r/g; faint systolic murmur loudest in RUSB   GI: normoactive bowel sounds, post-surgical wound with staples running across upper part of abdomen c/d/i  Extremities: bilateral pedal edema  Neurologic:   - Mental Status: Alert, relaxed, and cooperative. Thought process coherent. Oriented to   person, place, and time.    - Speech: clear, coherent, normal-paced  - Motor: Good muscle bulk and tone.     Data   Recent Labs   Lab 11/16/22  0213 11/15/22  2116 11/15/22  1844 11/15/22  0827 11/15/22  0531 11/14/22 2142 11/14/22  1853 11/14/22  0815 11/14/22  0715 11/12/22  0749 11/12/22  0718 11/11/22  0813 11/11/22  0742   WBC  --   --   --   --   --   --   --   --  5.6  --  4.8  --  4.9   HGB  --   --   --   --   --   --   --   --  9.4*  --  8.2*  --  8.7*   MCV  " --   --   --   --   --   --   --   --  97  --  95  --  93   PLT  --   --   --   --   --   --   --   --  366  --  185  --  160   NA  --   --  135  --  135  --  134  --  134  --  135   < >  --    POTASSIUM  --   --  5.6*  --  5.4*  --  5.8*  --  5.8*  --  4.5   < >  --    CHLORIDE  --   --  106  --  106  --  104  --  107  --  103   < >  --    CO2  --   --  23  --  24  --  25  --  21  --  25   < >  --    BUN  --   --  16  --  17  --  19  --  17  --  21   < >  --    CR  --   --  1.04  --  1.08*  --  1.09*  --  0.95  --  1.07*   < >  --    ANIONGAP  --   --  6  --  5  --  5  --  6  --  7   < >  --    MAURI  --   --  8.1*  --  8.6  --  8.2*  --  8.2*  --  7.1*   < >  --    * 271* 157*   < > 102*   < > 126*   < > 130*   < > 306*   < >  --    ALBUMIN  --   --   --   --   --   --   --   --  2.6*  --  2.2*   < >  --    PROTTOTAL  --   --   --   --   --   --   --   --  5.7*  --  4.8*   < >  --    BILITOTAL  --   --   --   --   --   --   --   --  0.5  --  0.5   < >  --    ALKPHOS  --   --   --   --   --   --   --   --  180*  --  186*   < >  --    ALT  --   --   --   --   --   --   --   --  47  --  64*   < >  --    AST  --   --   --   --   --   --   --   --  18  --  17   < >  --     < > = values in this interval not displayed.     No results found for this or any previous visit (from the past 24 hour(s)).  Medications     - MEDICATION INSTRUCTIONS -         - Skin Test Reading (tuberculin) -   Does not apply Q21 Days     amylase-lipase-protease  2 capsule Oral TID w/meals     aspirin  81 mg Oral Daily     [Held by provider] calcium carbonate  500 mg Oral BID w/meals     escitalopram  10 mg Oral Daily     gabapentin  200 mg Oral BID     insulin aspart  1-10 Units Subcutaneous TID AC     insulin aspart  1-7 Units Subcutaneous At Bedtime     insulin aspart   Subcutaneous TID w/meals     insulin glargine  12 Units Subcutaneous QAM AC     levothyroxine  150 mcg Oral QAM AC     lidocaine  2 patch Transdermal Q24H     lidocaine    Transdermal Q8H JULIEN     methocarbamol  500 mg Oral 4x Daily     multivitamin CF FORMULA  1 capsule Oral Daily     mycophenolate  750 mg Oral two times daily     nystatin  500,000 Units Swish & Swallow 4x Daily     pantoprazole  40 mg Oral QAM AC     polyethylene glycol  17 g Oral Daily     [Held by provider] potassium phosphate (monobasic)  500 mg Oral BID     senna-docusate  2 tablet Oral BID     sitagliptin  100 mg Oral Daily     [Held by provider] sulfamethoxazole-trimethoprim  1 tablet Oral Daily     tacrolimus  6 mg Oral two times daily     topiramate  50 mg Oral Daily     traZODone  50 mg Oral At Bedtime     tuberculin  5 Units Intradermal Q21 Days     valGANciclovir  450 mg Oral Daily

## 2022-11-16 NOTE — PROGRESS NOTES
Transplant Surgery Immunosuppression Management Note:  51yo with history of CUETO cirrhosis s/p liver transplant 11/2/22, nurys-en-y gastric bypass, DM2, HTN, and hepatic hydrothorax.       Hyperkalemia: Contacted regarding recent hyperkalemia. Bactrim on hold and K has improved. G6PD sent, Bactrim discontinued, and patient changed to dapsone 50 mg daily.     Per staff plan is for discharge home tomorrow. Message sent to transplant coordinator Edwina Fallon RN to follow up G6PD and notify her of discharge.     Recommendations:  -Add on G6PD (ordered)  -Stop Bactrim  -Start Dapsone 50 mg daily  -Please see discharge summary 11/10 with all recommended follow ups, lab monitoring, etc.      Bhavana Romo NP   Liver Transplant Surgery  NP/PA [id number] (Mon-Fri 6a-5p)  Fellow [phone]

## 2022-11-16 NOTE — PLAN OF CARE
Goal Outcome Evaluation:  194H: Susan Puckett,text paged Dr. Mari Howe   RM: 422  Hx: Liver Transplant  K-5.6 at 1844H ( elevated since yesterday) , K-Phos and Bactrim on hold, had NS bolus x 1 at 0842H . Thanks STEVE Daly RN  TCU  [phone]    2136H;Susan Puckett  RM: 422  Hx: Liver Transplant  Update: K-5.6 at 1844H(elevated since yesterday), K-Phos and Bactrim on hold, had NS bolus x 1 at 0842H.Can we give another bolus?Kayexalate?Thanks  Malika MOSS  TCU  [phone]    New orders from Dr. Mari Traylor x 1   Calcium Carb  On hold due to interaction with Kayexalate   To monitor patient's BM's , may consider to give another dose   No follow up lab recheck, sticky note left to provider     B, 271,unable to cover carb intake for supper, ate food from home, unsure of calorie content for the meal, sliding scale insulin given as ordered.Staples to abdomen intact,  abdominal pain  managed with Dilaudid PRN , Atarax PRN and Simethicone PRN  all given x 1834H , effective. Has been regular with her BM's per patient's report ( had large x 1, medium x 1) today .   Kayexalate given at 2312H. Encouraged oral hydration. To continue POC.

## 2022-11-16 NOTE — PLAN OF CARE
Occupational Therapy Discharge Summary    Reason for therapy discharge:    All goals and outcomes met, no further needs identified.in this setting    Progress towards therapy goal(s). See goals on Care Plan in [facility] electronic health record for goal details.  Goals met    Therapy recommendation(s):    Continued therapy is recommended.  Rationale/Recommendations:  pt met goals in this setting and has supportive  at home, discussed AE recommendations,and questions answered for pt, pt discharge from OT on 11/15 w/ antic discharge to home 11/17, recommend home OT eval and Rx to maximize indep w/ adls/iadls/mobility in home setting and assist w/ transition into community..

## 2022-11-16 NOTE — PLAN OF CARE
Care Coordination:     Writer met with patient, and called  after meeting. Writer explained that referrals were sent to over 15 agencies and due staffing or insurance they were not able to accept patient for PT/OT/RN services.     Writer updated Transplant coordinator, Edwina Fallon about absence of home care. Edwina has reached out to  mei today.     Writer will list transplant coordinator contact, and lab letter in discharge paperwork.     Transplant bag and medication card updated and will be sent home with patient.     On track for discharge on 11/17/22    Bhavana Latham   Patient Care Management Coordinator  Acute Rehabilitation Unit/ Transitional Care Unit.   Ph: [phone]

## 2022-11-16 NOTE — PROGRESS NOTES
Addendum: Patient's myasthenia panel has resulted negative so far (acetylcholine receptor binding and blocking antibodies are both negative, striated muscle antibody is negative.)  Acetylcholine modulating antibody is still pending, though of lower clinical significance compared to the others.    Notably, while the patient has bilateral ptosis, she has not complained of diplopia at any time over the past several years (though I was able to evoke this on examination).  It is possible for ocular myasthenia to be antibody negative and yet be debilitating for the patient.  I think it is less likely to be the case here since she is not complaining of diplopia, though I would encourage her to seek care either in neurology or ophthalmology if she experiences diplopia in addition to her ptosis.    Tiago Rainey MD

## 2022-11-17 ENCOUNTER — TELEPHONE (OUTPATIENT)
Dept: TRANSPLANT | Facility: CLINIC | Age: 52
End: 2022-11-17

## 2022-11-17 ENCOUNTER — NURSE TRIAGE (OUTPATIENT)
Dept: NURSING | Facility: CLINIC | Age: 52
End: 2022-11-17

## 2022-11-17 VITALS
DIASTOLIC BLOOD PRESSURE: 67 MMHG | BODY MASS INDEX: 32.35 KG/M2 | HEART RATE: 72 BPM | RESPIRATION RATE: 16 BRPM | OXYGEN SATURATION: 96 % | TEMPERATURE: 96 F | SYSTOLIC BLOOD PRESSURE: 112 MMHG | WEIGHT: 200.4 LBS

## 2022-11-17 DIAGNOSIS — Z98.84 HISTORY OF GASTRIC BYPASS: ICD-10-CM

## 2022-11-17 DIAGNOSIS — Z87.11 HISTORY OF GASTRIC ULCER: ICD-10-CM

## 2022-11-17 DIAGNOSIS — Z94.4 LIVER REPLACED BY TRANSPLANT (H): ICD-10-CM

## 2022-11-17 DIAGNOSIS — E83.42 HYPOMAGNESEMIA: ICD-10-CM

## 2022-11-17 DIAGNOSIS — K86.89 PANCREATIC INSUFFICIENCY: ICD-10-CM

## 2022-11-17 DIAGNOSIS — M79.2 NEUROPATHIC PAIN: ICD-10-CM

## 2022-11-17 LAB
ALBUMIN SERPL-MCNC: 2 G/DL (ref 3.4–5)
ALP SERPL-CCNC: 127 U/L (ref 40–150)
ALT SERPL W P-5'-P-CCNC: 28 U/L (ref 0–50)
ANION GAP SERPL CALCULATED.3IONS-SCNC: 3 MMOL/L (ref 3–14)
AST SERPL W P-5'-P-CCNC: 16 U/L (ref 0–45)
BILIRUB SERPL-MCNC: 0.4 MG/DL (ref 0.2–1.3)
BUN SERPL-MCNC: 12 MG/DL (ref 7–30)
CALCIUM SERPL-MCNC: 8 MG/DL (ref 8.5–10.1)
CHLORIDE BLD-SCNC: 108 MMOL/L (ref 94–109)
CO2 SERPL-SCNC: 25 MMOL/L (ref 20–32)
CREAT SERPL-MCNC: 1.08 MG/DL (ref 0.52–1.04)
ERYTHROCYTE [DISTWIDTH] IN BLOOD BY AUTOMATED COUNT: 15.6 % (ref 10–15)
GFR SERPL CREATININE-BSD FRML MDRD: 62 ML/MIN/1.73M2
GLUCOSE BLD-MCNC: 112 MG/DL (ref 70–99)
GLUCOSE BLDC GLUCOMTR-MCNC: 100 MG/DL (ref 70–99)
GLUCOSE BLDC GLUCOMTR-MCNC: 154 MG/DL (ref 70–99)
HCT VFR BLD AUTO: 25.1 % (ref 35–47)
HGB BLD-MCNC: 8.2 G/DL (ref 11.7–15.7)
MAGNESIUM SERPL-MCNC: 2 MG/DL (ref 1.6–2.3)
MCH RBC QN AUTO: 31.2 PG (ref 26.5–33)
MCHC RBC AUTO-ENTMCNC: 32.7 G/DL (ref 31.5–36.5)
MCV RBC AUTO: 95 FL (ref 78–100)
PHOSPHATE SERPL-MCNC: 3.7 MG/DL (ref 2.5–4.5)
PLATELET # BLD AUTO: 374 10E3/UL (ref 150–450)
POTASSIUM BLD-SCNC: 4.7 MMOL/L (ref 3.4–5.3)
PROT SERPL-MCNC: 5.1 G/DL (ref 6.8–8.8)
RBC # BLD AUTO: 2.63 10E6/UL (ref 3.8–5.2)
SODIUM SERPL-SCNC: 136 MMOL/L (ref 133–144)
TACROLIMUS BLD-MCNC: 10 UG/L (ref 5–15)
TME LAST DOSE: NORMAL H
TME LAST DOSE: NORMAL H
WBC # BLD AUTO: 2.5 10E3/UL (ref 4–11)

## 2022-11-17 PROCEDURE — 80197 ASSAY OF TACROLIMUS: CPT | Performed by: NURSE PRACTITIONER

## 2022-11-17 PROCEDURE — 84100 ASSAY OF PHOSPHORUS: CPT | Performed by: NURSE PRACTITIONER

## 2022-11-17 PROCEDURE — 80053 COMPREHEN METABOLIC PANEL: CPT | Performed by: NURSE PRACTITIONER

## 2022-11-17 PROCEDURE — 250N000012 HC RX MED GY IP 250 OP 636 PS 637: Performed by: INTERNAL MEDICINE

## 2022-11-17 PROCEDURE — 83735 ASSAY OF MAGNESIUM: CPT | Performed by: NURSE PRACTITIONER

## 2022-11-17 PROCEDURE — 85027 COMPLETE CBC AUTOMATED: CPT | Performed by: NURSE PRACTITIONER

## 2022-11-17 PROCEDURE — 36415 COLL VENOUS BLD VENIPUNCTURE: CPT | Performed by: NURSE PRACTITIONER

## 2022-11-17 PROCEDURE — 250N000013 HC RX MED GY IP 250 OP 250 PS 637: Performed by: INTERNAL MEDICINE

## 2022-11-17 PROCEDURE — 99316 NF DSCHRG MGMT 30 MIN+: CPT | Performed by: INTERNAL MEDICINE

## 2022-11-17 RX ORDER — HYDROMORPHONE HYDROCHLORIDE 2 MG/1
2 TABLET ORAL EVERY 6 HOURS PRN
Qty: 12 TABLET | Refills: 0 | Status: ON HOLD | OUTPATIENT
Start: 2022-11-17 | End: 2022-11-21

## 2022-11-17 RX ORDER — PANTOPRAZOLE SODIUM 40 MG/1
40 TABLET, DELAYED RELEASE ORAL
Qty: 30 TABLET | Refills: 0 | Status: SHIPPED | OUTPATIENT
Start: 2022-11-18 | End: 2022-11-18

## 2022-11-17 RX ORDER — SIMETHICONE 80 MG
80 TABLET,CHEWABLE ORAL EVERY 6 HOURS PRN
Qty: 120 TABLET | Refills: 0 | Status: SHIPPED | OUTPATIENT
Start: 2022-11-17 | End: 2022-12-17

## 2022-11-17 RX ADMIN — TRAZODONE HYDROCHLORIDE 50 MG: 50 TABLET ORAL at 00:29

## 2022-11-17 RX ADMIN — HYDROMORPHONE HYDROCHLORIDE 2 MG: 2 TABLET ORAL at 09:44

## 2022-11-17 RX ADMIN — VALGANCICLOVIR HYDROCHLORIDE 450 MG: 450 TABLET ORAL at 07:39

## 2022-11-17 RX ADMIN — POLYETHYLENE GLYCOL 3350 17 G: 17 POWDER, FOR SOLUTION ORAL at 07:40

## 2022-11-17 RX ADMIN — METHOCARBAMOL 500 MG: 500 TABLET ORAL at 07:38

## 2022-11-17 RX ADMIN — SITAGLIPTIN 100 MG: 100 TABLET, FILM COATED ORAL at 07:38

## 2022-11-17 RX ADMIN — HYDROMORPHONE HYDROCHLORIDE 2 MG: 2 TABLET ORAL at 03:25

## 2022-11-17 RX ADMIN — LEVOTHYROXINE SODIUM 150 MCG: 75 TABLET ORAL at 06:49

## 2022-11-17 RX ADMIN — DAPSONE 50 MG: 25 TABLET ORAL at 07:47

## 2022-11-17 RX ADMIN — GABAPENTIN 200 MG: 100 CAPSULE ORAL at 07:40

## 2022-11-17 RX ADMIN — PANTOPRAZOLE SODIUM 40 MG: 40 TABLET, DELAYED RELEASE ORAL at 06:48

## 2022-11-17 RX ADMIN — TOPIRAMATE 50 MG: 50 TABLET ORAL at 07:40

## 2022-11-17 RX ADMIN — ASPIRIN 81 MG CHEWABLE TABLET 81 MG: 81 TABLET CHEWABLE at 07:40

## 2022-11-17 RX ADMIN — INSULIN GLARGINE 12 UNITS: 100 INJECTION, SOLUTION SUBCUTANEOUS at 07:47

## 2022-11-17 RX ADMIN — HYDROXYZINE HYDROCHLORIDE 50 MG: 25 TABLET, FILM COATED ORAL at 07:39

## 2022-11-17 RX ADMIN — MYCOPHENOLATE MOFETIL 750 MG: 250 CAPSULE ORAL at 07:37

## 2022-11-17 RX ADMIN — INSULIN ASPART 8 UNITS: 100 INJECTION, SOLUTION INTRAVENOUS; SUBCUTANEOUS at 09:47

## 2022-11-17 RX ADMIN — TACROLIMUS 6 MG: 5 CAPSULE ORAL at 07:38

## 2022-11-17 RX ADMIN — PANCRELIPASE 2 CAPSULE: 24000; 76000; 120000 CAPSULE, DELAYED RELEASE PELLETS ORAL at 07:46

## 2022-11-17 RX ADMIN — SENNOSIDES AND DOCUSATE SODIUM 2 TABLET: 50; 8.6 TABLET ORAL at 07:39

## 2022-11-17 RX ADMIN — LIDOCAINE PATCH 4% 2 PATCH: 40 PATCH TOPICAL at 07:40

## 2022-11-17 RX ADMIN — ESCITALOPRAM OXALATE 10 MG: 10 TABLET ORAL at 07:40

## 2022-11-17 RX ADMIN — NYSTATIN 500000 UNITS: 100000 SUSPENSION ORAL at 07:40

## 2022-11-17 ASSESSMENT — ACTIVITIES OF DAILY LIVING (ADL)
ADLS_ACUITY_SCORE: 30

## 2022-11-17 NOTE — TELEPHONE ENCOUNTER
"Spoke to Susan. She is home and settling in. She reports getting in/out of the car and up/down the stairs has been going well. She currently has a cane, states that \"my body is disproportionate and I'm still pretty swollen\", she will borrow her friends walker for better assistance with ambulating for the time being. Plan for outpatient PT/OT as well.    Reviewed appts for next week. Plan to get labs drawn Monday prior to provider appt and Wednesday right before lymphedema appt. She does not have home care, will get labs drawn locally in [place]. Discussed importance of taking Tacrolimus AFTER blood draws. Patient and spouse understand.    Patient reports she has been having lower back pain, was given Robaxin in the hospital which patient states helped tremendously. Discharged with 12 tablets Dilaudid and Gabapentin. Requesting Robaxin for muscle spasms, I let Susan know I will ask for prescription for Robaxin but if I do not get an answer, to ask Monday at clinic visit. Patient understands.    Instructed patient to write down VS, BG, food log, weights for the first few weeks so we can review over the phone and with provider at appts. Pt agrees and will start doing that.        "

## 2022-11-17 NOTE — PLAN OF CARE
Goal Outcome Evaluation: Met for discharge  D: Discharging to home with . Reviewed discharge instructions and Susan new medications. Instructed to follow up with her primary care provider within the week and work on using one central pharmacy now that she is taking multiple medications. She agree this would be a good idea. P: Discharge to home when medications are here.

## 2022-11-17 NOTE — PLAN OF CARE
Physical Therapy Discharge Summary    Reason for therapy discharge:    Discharged to home with outpatient therapy.    Progress towards therapy goal(s). See goals on Care Plan in [facility] electronic health record for goal details.  Goals met    Therapy recommendation(s):    Continued therapy is recommended.  Rationale/Recommendations:  Patient met goals at this level of care but would benefit from outpatient physical therapy to address balance, strength and to reduce fall risk.

## 2022-11-17 NOTE — PLAN OF CARE
Goal Outcome Evaluation  Patient K -5.1 today ( Bactrim, K Phos, Calcium  remains on hold). Staples to abdomen intact,  abdominal pain  managed with Dilaudid PRN , Atarax PRN and Simethicone PRN  all given x 1800H , effective. Has been regular with her BM's per patient's report , had BM today.Wants her due Trazodone at 2330H.   Discharging to home tomorrow at 1100H.     B, 160, sliding scale insulin and carb coverage given      Patient's most recent vital signs are:     Vital signs:  BP: 115/73  Temp: 97.2  HR: 74  RR: 20  SpO2: 96 % /RA    Patient does not have new respiratory symptoms.  Patient does not have new sore throat.  Patient does not have a fever greater than 99.5.

## 2022-11-17 NOTE — DISCHARGE SUMMARY
[facility]  Hospitalist Discharge Summary      Date of Admission:  11/10/2022  Date of Discharge:  11/17/2022  Discharging Provider: Celestino Morales DO  Discharge Service: Hospitalist Service    Discharge Diagnoses   History of decompensated cirrhosis  S/p orthotopic liver transplant  Hyperkalemia, mild  Dysarthria (resolved)  Postop pain  Microcytic anemia, chronic  Thrombocytopenia, chronic  GEORGETTE (resolved)  Hyponatremia (improving)  Hypokalemia (resolved)  Hypocalcemia (improving)  Hypophosphatemia (improving)  Hypoalbuminemia-improving  History of Faustino-en-Y gastric bypass  Pancreatic insufficiency  Insulin-dependent diabetes  Hypothyroidism  Depression, anxiety  Hx migraine  Hx seizure    Follow-ups Needed After Discharge   Follow-up Appointments     Adult [facility] Follow-up and recommended labs and tests      Please follow up with primary care provider at patient's earliest   convenience.  Please follow up with transplant nephrology at patient's earliest   convenience.    Appointments on [place] and/or [place] campus (with [facility] or [facility]   provider or service). Call [phone] if you haven't heard regarding   these appointments within 7 days of discharge.             Unresulted Labs Ordered in the Past 30 Days of this Admission     Date and Time Order Name Status Description    11/17/2022 12:01 AM Tacrolimus by Tandem Mass Spectrometry In process     11/17/2022 12:01 AM Phosphorus In process     11/17/2022 12:01 AM Magnesium In process     11/17/2022 12:01 AM Comprehensive metabolic panel Preliminary     11/16/2022  1:55 PM Glucose 6 phosphate dehydrogenase In process     11/1/2022 10:13 PM Prepare plasma (unit) Preliminary     11/1/2022 10:13 PM Prepare plasma (unit) Preliminary     11/1/2022 10:13 PM Prepare plasma (unit) Preliminary     11/1/2022 10:13 PM Prepare red blood cells (unit) Preliminary     11/1/2022 10:13 PM Prepare red blood cells (unit) Preliminary     11/1/2022 10:13 PM  Prepare red blood cells (unit) Preliminary     11/1/2022 10:13 PM Prepare red blood cells (unit) Preliminary     11/1/2022 10:13 PM Prepare red blood cells (unit) Preliminary       These results will be followed up by primary care provider.    Discharge Disposition   Discharged to home  Condition at discharge: Stable    Hospital Course   Susan Puckett is a 52-year-old female with a history of nonalcoholic steatohepatitis complicated by cirrhosis s/p TIPS with history of cirrhosis related complications including pleural effusion, ascites, hepatic encephalopathy, hepatic hydrothorax s/p multiple thoracenteses.  Patient also has a history of anemia, insulin-dependent diabetes, GERD hypothyroidism, depression, anxiety, migraines, HSV infection. Patient is s/p Faustino-en-Y gastric bypass.  Patient presented [facility] on 11/1/2022 for orthotopic liver transplant on 11/2/2022 with Dr. Delbert Morgan.  Surgery was largely uncomplicated.  Hospital course complicated by multiple metabolic derangements including hypokalemia, hyponatremia, hypophosphatemia all of which were addressed adequately.  Patient also had acute blood loss anemia but hemoglobin remained stable postop.  There was noted elevated LFTs postop which improved during course of hospital stay.  Patient was discharged to TCU on 11/10/2022 for ongoing medical management and rehabilitation. Course here in the TCU has been notable for acute onset dysarthria which has since improved significantly.            #History of decompensated cirrhosis  #S/p orthotopic liver transplant on 11/2/2022 with Dr. Vizcaino  Ultrasound abdomen on 11/4 shows persistence of elevated resistive indicis in main and right hepatic arteries, with slightly improved flow in left hepatic artery.  Portal veins noted to be patent with appropriate directional flow.    Immunosuppression  1. completed steroid taper on 11/07  2. Tacrolimus 6 mg BID (goal 8 - 12)  - Last tacro level 11/10 => 10.5  - Next  draw 11/14 => 9.7   - Tacro level qTue& Thurs  3. Mycophenolate 750 mg bid    Infection ppx  ? Bactrim --> Dapsone 50 mg qd  x6 months  ? F/up G6PD  ? valganciclovir x12 weeks (11/03/22 - 03/03/23)    Anticoagulation with ASA x6 months    Cont to monitor LFTs       #Hyperkalemia, mild  Likely iatrogenic as patient is on Calcineurin inhibitor & had also been on Bactrim    Discontinue bactrim & start dapsone instead    Lokelma 10 g standing for now    continue to monitor BMP check for now      #Dysarthria - resolved  Onset was evening of 11/10. On 11/11 code stroke called with CT imaging showing no evidence of stroke. MRI brain w&w/o IV contrast also unremarkable.  Dysathria has since improved significantly since admission.  Neurology saw patient on 11/12 recommending obtaining myasthenia panel    Etiology unclear but likely combination of metabolic derangement, increased stress, medication side effect  -Patient seen and cleared by speech on 11/12  -Myasthenia panel has resulted negative; still pending Acetylcholine modulating antibody    Follow-up remaining results of the panel    Correct electrolyte abnormalities as below     #Postop pain    PO dilaudid 2mg Q6h prn Robaxin 500mg QID, Atarax PRN. add lidocaine patches.     #Microcytic anemia, chronic - baseline hgb ~9.0  #Thrombocytopenia, chronic - likely due to hx of cirrhosis   -no evidence of bleed at this time    Cont to monitor     #GEORGETTE-resolved  -likely pre-renal from diuresis in an attempt to correct hyperK  -ctm     #Hyponatremia-improving  #Hypokalemia-resolved  #Hypocalcemia-improving  #Hypophosphatemia-improving    Replete above electrolytes and continue to monitor    Cont ca++ supplementation      #Hypoalbuminemia-improving    Nutrition consult    Cont to monitor     #History of Faustino-en-Y gastric bypass  #Pancreatic insufficiency    Dietary consult    Creon [id number] with meals and snacks        #Insulin-dependent diabetes  A1c 5.7 on 11/02    Continue  Lantus 12 units every morning    NovoLog high insulin resistance with meals    Add PTA Januvia 100 mg every day      #Hypothyroidism    PTA levothyroxine 150 mcg     #Depression, #anxiety    Continue PTA Lexapro 10 mg daily    Trazodone nightly 50 mg.     #History of migraine    Cont PTA topiramate 50 mg daily     #History of seizure  First at the age of 15 after staying up all night while volunteering; second was following hypoglycemic episode.     Immunization:   Immunization History   Administered Date(s) Administered     COVID-19,PF,Pfizer (12+ Yrs) 03/11/2021, 04/03/2021, 09/07/2021     FLU 6-35 months 10/15/2019     HepB-Adult 10/24/2006, 12/04/2006, 04/27/2007     Influenza Vaccine IM > 6 months Valent IIV4 (Alfuria,Fluzone) 12/13/2018, 10/15/2019, 10/05/2020, 09/28/2021     Mantoux Tuberculin Skin Test 11/12/2022     Pneumococcal 23 valent 11/22/2019     TD (ADULT, 7+) 09/20/2002     TDAP Vaccine (Boostrix) 10/03/2012     Twinrix A/B 10/31/2019, 10/31/2019, 08/11/2020, 08/11/2020, 02/04/2021     Indication for psychotropic medications:   - Escitalopram 10 mg for depression  - Gabapentin 200 mg twice daily for neuropathic pain  - Topiramate 50 mg daily for migraine prophylaxis  - Trazodone 50 mg nightly, for insomnia, depression  - Atarax as needed for pain, anxiety  - Dilaudid p.o. for acute postop pain    Is/Os: No intake or output data in the 24 hours ending 11/13/22 0719  Length of stay: 6  Diet: Snacks/Supplements Adult: Ensure Clear; With Meals  2 Gram K Diet    DVT Prophylaxis:  mg   Rossi Catheter: Not present  Central Lines: None  Cardiac Monitoring: None  Code Status: Full Code        Consultations This Hospital Stay   NUTRITION SERVICES ADULT IP CONSULT  NEUROLOGY GENERAL ADULT IP CONSULT  SPEECH LANGUAGE PATH ADULT IP CONSULT  OCCUPATIONAL THERAPY ADULT IP CONSULT  PHYSICAL THERAPY ADULT IP CONSULT  PSYCHOLOGY ADULT IP CONSULT    Code Status   Full Code    Time Spent on this Encounter   I,  Celestino Morales DO, personally saw the patient today and spent greater than 30 minutes discharging this patient.       Celestino Morales DO  [facility] TRANSITIONAL CARE UNIT [address] [address]  Phone: [phone]  ______________________________________________________________________       Primary Care Physician   Harleen Billy    Discharge Orders      Reason for your hospital stay    Patient was admitted to the TCU for nonalcoholic steatohepatitis complicated by cirrhosis s/p TIPS with history of cirrhosis related complications including pleural effusion, ascites, hepatic encephalopathy, hepatic hydrothorax s/p multiple thoracenteses.     Activity    Your activity upon discharge: activity as tolerated     Adult [facility] Follow-up and recommended labs and tests    Please follow up with primary care provider at patient's earliest convenience.  Please follow up with transplant nephrology at patient's earliest convenience.    Appointments on [place] and/or [facility] (with [facility] or [facility] provider or service). Call [phone] if you haven't heard regarding these appointments within 7 days of discharge.     Diet    Follow this diet upon discharge: Orders Placed This Encounter      Snacks/Supplements Adult: Ensure Clear; With Meals      2 Gram K Diet       Significant Results and Procedures   Results for orders placed or performed during the hospital encounter of 11/10/22   CT Head w/o Contrast    Narrative    CT HEAD W/O CONTRAST 11/11/2022 11:13 AM    History: stroke code facial droop and aphasia     Comparison: 7/9/2022    Technique: Using multidetector thin collimation helical acquisition  technique, axial, coronal and sagittal CT images from the skull base  to the vertex were obtained without intravenous contrast.   (topogram) image(s) also obtained and reviewed.    Findings: There is no intracranial hemorrhage, mass effect, or midline  shift. Gray/white matter  differentiation in both cerebral hemispheres  is preserved. Ventricles are proportionate to the cerebral sulci. The  basal cisterns are clear.    The bony calvaria and the bones of the skull base are normal. The  visualized portions of the paranasal sinuses and mastoid air cells are  clear.      Impression    Impression:  No acute intracranial pathology.    I have personally reviewed the examination and initial interpretation  and I agree with the findings.    DEBORAH LUCERO MD         SYSTEM ID:  [id number]   CTA Head Neck w Contrast    Narrative    CTA HEAD NECK W CONTRAST 11/11/2022 11:14 AM    CT angiogram of the neck   CT angiogram of the base of the brain with contrast  Reconstruction on the 3D workstation    Provided History:  stroke code    Comparison:  7/9/2022 CT.      Technique:  HEAD and NECK CTA: During rapid bolus intravenous injection of  nonionic contrast material, axial images were obtained using thin  collimation multidetector helical technique from the base of the upper  aortic arch through the [place] of [place]. This CT angiogram data was  reconstructed at thin intervals with mild overlap. Images were sent to  the 3D workstation, and 3D reconstructions were obtained. The axial  source images, multiplanar reformations, 3D reconstructions in both  maximum intensity projection display and volume rendered models were  reviewed, with reconstructions performed by the technologist.    Contrast: 75mL Isovue 370    Findings:    Head CTA demonstrates no aneurysm or stenosis of the major  intracranial arteries. Functional fetal origin of the right posterior  cerebral artery. Communicating arteries are patent.    Neck CTA demonstrates no stenosis of the major cervical arteries. The  origins of the great vessels from the aortic arch are patent. The  normal distal right internal carotid artery measures 5 mm. The normal  distal left internal carotid artery measures 5 mm.     No mass within the visualized portions  of the cervical soft tissues or  lung apices.       Impression    Impression:    1. Head CTA demonstrates no aneurysm or stenosis of the major  intracranial arteries.   2. Neck CTA demonstrates no stenosis of the major cervical arteries.    I have personally reviewed the examination and initial interpretation  and I agree with the findings.    DEBORAH LUCERO MD         SYSTEM ID:  [id number]   MR Brain w/o & w Contrast    Narrative    MR BRAIN W/O & W CONTRAST 11/11/2022 7:46 PM    Provided History: Acute onset dysarthria. CTA Head&Neck negative  for acute intracranial pathology    Comparison:  Head CT 11/11/2022     Technique: Multisequence, multiplanar images of the brain were  obtained without and with intravenous contrast.    Contrast: 9mL Gadavist    Findings:   Exam is partially limited by motion.    No intracranial mass lesion, mass effect, midline shift, or abnormal  fluid collection. The ventricles and sulci are normal for age. No  abnormality of reduced diffusion.  Normal intravascular flow voids.    No abnormal enhancing intracranial lesions.    The visualized paranasal sinuses are clear. Trace fluid in the mastoid  air cells. The orbits are unremarkable.      Impression    Impression: Unremarkable brain MRI.    I have personally reviewed the examination and initial interpretation  and I agree with the findings.    CORBY BAUTISTA MD         SYSTEM ID:  [id number]       Discharge Medications   Current Discharge Medication List      START taking these medications    Details   dapsone (ACZONE) 25 MG tablet Take 2 tablets (50 mg) by mouth daily  Qty: 60 tablet, Refills: 0    Associated Diagnoses: Liver replaced by transplant (H)      HYDROmorphone (DILAUDID) 2 MG tablet Take 1 tablet (2 mg) by mouth every 6 hours as needed for moderate to severe pain  Qty: 12 tablet, Refills: 0    Associated Diagnoses: Pain      insulin aspart (NOVOLOG VIAL) 100 UNITS/ML vial Inject 1-10 Units Subcutaneous 4 times daily (with  meals and nightly)    Comments: 0.5 unit(s) per 14g cho AC breakfast/lunch AND 0.5 unit(s) per 20 g cho for dinner AND NO meal insulin after 2000 hrs.  Snack coverage:   0800 - 1700 - 0.5 unit(s) per 14 g cho and 1701 - 2000 0.5 unit(s) per 20 g cho  Associated Diagnoses: Type 2 diabetes mellitus without complication, without long-term current use of insulin (H)      nystatin (MYCOSTATIN) 441076 UNIT/ML suspension Swish and swallow 5 mLs (500,000 Units) in mouth 4 times daily for 30 days  Qty: 600 mL, Refills: 0    Associated Diagnoses: Liver replaced by transplant (H)      simethicone (MYLICON) 80 MG chewable tablet Take 1 tablet (80 mg) by mouth every 6 hours as needed for cramping  Qty: 120 tablet, Refills: 0    Associated Diagnoses: Abdominal bloating      sitagliptin (JANUVIA) 100 MG tablet Take 1 tablet (100 mg) by mouth daily  Qty: 30 tablet, Refills: 0    Associated Diagnoses: Type 2 diabetes mellitus without complication, without long-term current use of insulin (H)         CONTINUE these medications which have CHANGED    Details   aspirin (ASA) 81 MG chewable tablet Take 1 tablet (81 mg) by mouth daily  Qty: 30 tablet, Refills: 0    Associated Diagnoses: Liver replaced by transplant (H)      gabapentin (NEURONTIN) 100 MG capsule Take 2 capsules (200 mg) by mouth 2 times daily  Qty: 120 capsule, Refills: 0    Associated Diagnoses: Neuropathic pain      mycophenolate (GENERIC EQUIVALENT) 250 MG capsule Take 3 capsules (750 mg) by mouth two times daily  Qty: 180 capsule, Refills: 0    Associated Diagnoses: Liver replaced by transplant (H)      pantoprazole (PROTONIX) 40 MG EC tablet Take 1 tablet (40 mg) by mouth every morning (before breakfast) for 30 days  Qty: 30 tablet, Refills: 0    Associated Diagnoses: History of gastric ulcer      tacrolimus (GENERIC EQUIVALENT) 1 MG capsule Take 6 capsules (6 mg) by mouth two times daily  Qty: 360 capsule, Refills: 0    Associated Diagnoses: Liver replaced by  transplant (H)      valGANciclovir (VALCYTE) 450 MG tablet Take 1 tablet (450 mg) by mouth daily  Qty: 30 tablet, Refills: 0    Associated Diagnoses: Liver replaced by transplant (H)         CONTINUE these medications which have NOT CHANGED    Details   !! amylase-lipase-protease (CREON 24) 32338-62238 units CPEP per EC capsule Take 2 capsules by mouth 3 times daily (with meals)    Associated Diagnoses: Pancreatic insufficiency      !! amylase-lipase-protease (CREON 24) 06975-49856 units CPEP per EC capsule Take 1 capsule by mouth Take with snacks or supplements (with snacks)  Qty: 90 capsule, Refills: 0    Associated Diagnoses: History of gastric bypass      calcium carbonate-vitamin D (OSCAL W/D) 500-200 MG-UNIT tablet Take 1 tablet by mouth 2 times daily (with meals) Take one tab once in AM and once in PM with meals  Qty: 180 tablet, Refills: 3    Associated Diagnoses: Vitamin D deficiency; Cirrhosis of liver (H)      Continuous Blood Gluc  (DEXCOM G6 ) LUNA Use as directed for continuous glucose monitoring.      Continuous Blood Gluc Sensor (DEXCOM G6 SENSOR) MISC Use as directed for continuous glucose monitoring.  Change sensor every 10 days.      Continuous Blood Gluc Transmit (DEXCOM G6 TRANSMITTER) MISC Use as directed for continuous glucose monitoring.  Change every 3 months.      cyanocobalamin (VITAMIN B-12) 1000 MCG tablet Take 1,000 mcg by mouth every 7 days Take 1 tablet by mouth every 7 days on Wednesdays      escitalopram (LEXAPRO) 10 MG tablet Take 1 tablet (10 mg) by mouth daily  Qty: 60 tablet, Refills: 0    Associated Diagnoses: Anxiety      folic acid (FOLVITE) 1 MG tablet Take 1 mg by mouth every morning      insulin glargine (LANTUS PEN) 100 UNIT/ML pen Inject 12 Units Subcutaneous every morning (before breakfast)  Qty: 15 mL    Comments: If Lantus is not covered by insurance, may substitute Basaglar or Semglee or other insulin glargine product per insurance preference at same  dose and frequency.    Associated Diagnoses: Type 2 diabetes mellitus without complication, without long-term current use of insulin (H)      insulin pen needle (BD GRISEL U/F) 32G X 4 MM miscellaneous Inject 1 each Subcutaneous      levothyroxine (SYNTHROID/LEVOTHROID) 150 MCG tablet Take 1 tablet by mouth daily      magnesium oxide (MAG-OX) 400 MG tablet Take 1 tablet (400 mg) by mouth 2 times daily    Associated Diagnoses: Hypomagnesemia      melatonin 5 MG tablet Take 5 mg by mouth At Bedtime      multivitamin CF FORMULA (DEKAS PLUS) capsule Take 1 capsule by mouth daily  Qty: 90 capsule, Refills: 3    Associated Diagnoses: Cirrhosis of liver without ascites, unspecified hepatic cirrhosis type (H)      ondansetron (ZOFRAN ODT) 8 MG ODT tab Take 1 tablet by mouth every 8 hours as needed for nausea      polyethylene glycol (MIRALAX) 17 GM/Dose powder Take 17 g by mouth daily  Qty: 510 g    Associated Diagnoses: Liver replaced by transplant (H)      sennosides (SENOKOT) 8.6 MG tablet 2 tablets by Oral or Feeding Tube route 2 times daily    Associated Diagnoses: Liver replaced by transplant (H)      topiramate (TOPAMAX) 50 MG tablet Take 50 mg by mouth daily       traZODone (DESYREL) 100 MG tablet Take 0.5 tablets (50 mg) by mouth At Bedtime       !! - Potential duplicate medications found. Please discuss with provider.      STOP taking these medications       hydrOXYzine (ATARAX) 25 MG tablet Comments:   Reason for Stopping:         insulin aspart (NOVOLOG PEN) 100 UNIT/ML pen Comments:   Reason for Stopping:         insulin aspart (NOVOLOG PEN) 100 UNIT/ML pen Comments:   Reason for Stopping:         insulin aspart (NOVOLOG PEN) 100 UNIT/ML pen Comments:   Reason for Stopping:         insulin aspart (NOVOLOG PEN) 100 UNIT/ML pen Comments:   Reason for Stopping:         Lidocaine (LIDOCARE) 4 % Patch Comments:   Reason for Stopping:         methocarbamol (ROBAXIN) 500 MG tablet Comments:   Reason for Stopping:          oxyCODONE (ROXICODONE) 5 MG tablet Comments:   Reason for Stopping:         potassium phosphate, monobasic, (K-PHOS) 500 MG tablet Comments:   Reason for Stopping:         sulfamethoxazole-trimethoprim (BACTRIM) 400-80 MG tablet Comments:   Reason for Stopping:             Allergies   Allergies   Allergen Reactions     Methylprednisolone Hives     Per patient, reaction occurred in 1999 or earlier. Broke out in round, flat hives in neck and chest area. No shortness of breath or swelling. Unknown if reaction occurred immediately after administration.      Droperidol Anxiety     Nsaids Other (See Comments)     GI bleed.     Prednisone Anxiety, Hives and Rash

## 2022-11-17 NOTE — PROGRESS NOTES
Follow up RUQ US with preliminary results showing nonocclusive DVT in axillary and one of the paired brachial veins of the right upper cavity.   - Remove PICC   - Start Lovenox 1 mg/kg BID   - Monitor CBC BID (pancytopenia hgb 9.7, plts 51)   - Consider repeat US 2-3 days following PICC removal to evaluate if anticoagulation continuation indicated     Case discussed with attending, Dr. Buck.     Lia Pike PA-C  [facility]  Securely message with the Vocera Web Console (learn more here)  Text page via [facility] Paging/Directory     Attending Only

## 2022-11-17 NOTE — DISCHARGE INSTRUCTIONS
Transplant Coordinator:   Edwina Fallon   Ph: [phone]  If you have questions related to your transplant. Please call the number listed above to speak with your coordinator.     Lab Letter:   OUTPATIENT LABORATORY TEST ORDER  Home care/Patient copy     Patient Name: Susan Puckett   YOB: 1970     [facility] MR# [if applicable]: [id number]   Date & Time: November 3, 2022  11:03 AM  Expiration Date: 1 year after date issued       Diagnosis: Liver Transplant (ICD-10 Z94.4)   Aftercare following organ transplant (ICD-10 Z48.288)   Long term use of medications (ICD-10 Z79.899)   Contact with and (suspected) exposure to other viral communicable   diseases (Z20.828)      We ask your assistance in obtaining the following laboratory tests, which are part of our routine surveillance program for Solid Organ Transplant patients.     Please fax each result to [phone], same day as resulted/available    Critical lab results page [phone]  Monday - Friday 8 am to 5 pm  Evening/Weekend/Holiday communicate Critical labs results [phone]    First 8 weeks post-transplant (11/02/2022)  Labs 2x/week (Monday/Thursday)  CBC with Platelets   Basic Metabolic Panel   Phosphorous  Magnesium  Hepatic panel   Tacrolimus drug level - 12-hour trough, please document time of last dose     At 1-month post-transplant (11/02/2022)  HBV, HCV, HIV by ROMMEL testing  If unable to conduct ROMMEL testing, please substitute the following:   Hepatitis B DNA Quantitative, Real-Time PCR   Hepatitis C RNA, Quantitation   HIV 1 RNA, Quantitation   HIV 2 RNA, Quantitation     Month 3 post-transplant (11/02/2022)  Labs weekly (Monday or Tuesday)  CBC with Platelets   Basic Metabolic Panel   Phosphorous  Magnesium  Hepatic panel   Tacrolimus drug level - 12-hour trough, please document time of last dose       Months 4 post-transplant (date range)  Labs every other week  CBC with Platelets   Basic Metabolic Panel    Phosphorous  Magnesium  Hepatic panel   Tacrolimus drug level - 12-hour trough, please document time of last dose     Months 5-12 post-transplant (date range)  Labs monthly  CBC with Platelets   Basic Metabolic Panel   Phosphorous  Magnesium  Hepatic panel   Tacrolimus drug level - 12-hour trough, please document time of last dose     12-months post-transplant  Fasting Lipid Panel  Urine protein/creatinine ratio  UA with reflex to micro  Hepatitis B DNA PCR on all patients      If you have questions, please call [phone]           Delbert Morgan MD/PhD  Professor of Surgery  Chief, Division of Transplantation    Executive Medical Director  Solid Organ Transplant Service Line

## 2022-11-17 NOTE — PLAN OF CARE
Pt is A&OX4, calm, & cooperative with care. Denied CP, SOB, & n/v. MOD I with cane. Continent for both B&B; uses the bathroom. Takes med whole with thin liquid. Pt started the shift taking scheduled trazodone. Managed pain with PRN dilaudid X1. Pt is expected to discharge today (11/17/22). Pt slept well for most of the night. Able to make needs known & call light is within reach. Will continue with plan of care.    Patient's most recent vital signs are:     Vital signs:  BP: 115/73  Temp: 97.2  HR: 74  RR: 20  SpO2: 96 %     Patient does not have new respiratory symptoms.  Patient does not have new sore throat.  Patient does not have a fever greater than 99.5.

## 2022-11-17 NOTE — TELEPHONE ENCOUNTER
Rivera calling - consent on file.     Patient was discharged home from TCU today and sent home with medications. Rivera reports that 2 of the medications are missing - Magnesium oxide and Senna. He would also like someone from the team to call and discuss exactly the medications he should be giving her. Will route to transplant coordination team for assistance.     Rivera callback # [phone]  Susan callback # [phone]    Caleb Meraz RN on 11/17/2022 at 5:27 PM    Reason for Disposition    [1] Caller has NON-URGENT medicine question about med that PCP prescribed AND [2] triager unable to answer question    Additional Information    Negative: [1] Intentional drug overdose AND [2] suicidal thoughts or ideas    Negative: MORE THAN A DOUBLE DOSE of a prescription or over-the-counter (OTC) drug    Negative: [1] DOUBLE DOSE (an extra dose or lesser amount) of prescription drug AND [2] any symptoms (e.g., dizziness, nausea, pain, sleepiness)    Negative: [1] DOUBLE DOSE (an extra dose or lesser amount) of over-the-counter (OTC) drug AND [2] any symptoms (e.g., dizziness, nausea, pain, sleepiness)    Negative: Took another person's prescription drug    Negative: [1] DOUBLE DOSE (an extra dose or lesser amount) of prescription drug AND [2] NO symptoms (Exception: a double dose of antibiotics)    Negative: Diabetes drug error or overdose (e.g., took wrong type of insulin or took extra dose)    Negative: [1] Prescription not at pharmacy AND [2] was prescribed by PCP recently (Exception: triager has access to EMR and prescription is recorded there. Go to Home Care and confirm for pharmacy.)    Negative: [1] Pharmacy calling with prescription question AND [2] triager unable to answer question    Negative: [1] Caller has URGENT medicine question about med that PCP or specialist prescribed AND [2] triager unable to answer question    Negative: Medicine patch causing local rash or itching    Negative: [1] Caller has  medicine question about med NOT prescribed by PCP AND [2] triager unable to answer question (e.g., compatibility with other med, storage)    Negative: Prescription request for new medicine (not a refill)    Protocols used: MEDICATION QUESTION CALL-A-AH

## 2022-11-18 ENCOUNTER — TELEPHONE (OUTPATIENT)
Dept: CARE COORDINATION | Facility: CLINIC | Age: 52
End: 2022-11-18

## 2022-11-18 ENCOUNTER — TELEPHONE (OUTPATIENT)
Dept: TRANSPLANT | Facility: CLINIC | Age: 52
End: 2022-11-18

## 2022-11-18 LAB — G6PD RBC-CCNT: 16.3 U/G HB

## 2022-11-18 RX ORDER — MYCOPHENOLATE MOFETIL 250 MG/1
750 CAPSULE ORAL
Qty: 540 CAPSULE | Refills: 1 | Status: ON HOLD | OUTPATIENT
Start: 2022-11-18 | End: 2022-12-06

## 2022-11-18 RX ORDER — ASPIRIN 81 MG/1
81 TABLET, CHEWABLE ORAL DAILY
Qty: 90 TABLET | Refills: 3 | Status: SHIPPED | OUTPATIENT
Start: 2022-11-18 | End: 2023-12-29

## 2022-11-18 RX ORDER — GABAPENTIN 100 MG/1
200 CAPSULE ORAL 2 TIMES DAILY
Qty: 120 CAPSULE | Refills: 0 | Status: SHIPPED | OUTPATIENT
Start: 2022-11-18 | End: 2022-11-22

## 2022-11-18 RX ORDER — VALGANCICLOVIR 450 MG/1
450 TABLET, FILM COATED ORAL DAILY
Qty: 90 TABLET | Refills: 3 | Status: SHIPPED | OUTPATIENT
Start: 2022-11-18 | End: 2022-12-13

## 2022-11-18 RX ORDER — PANTOPRAZOLE SODIUM 40 MG/1
40 TABLET, DELAYED RELEASE ORAL
Qty: 90 TABLET | Refills: 3 | Status: ON HOLD | OUTPATIENT
Start: 2022-11-18 | End: 2022-12-06

## 2022-11-18 RX ORDER — TACROLIMUS 1 MG/1
6 CAPSULE ORAL
Qty: 360 CAPSULE | Refills: 3 | Status: ON HOLD | OUTPATIENT
Start: 2022-11-18 | End: 2022-12-06

## 2022-11-18 RX ORDER — MAGNESIUM OXIDE 400 MG/1
400 TABLET ORAL 2 TIMES DAILY
Qty: 180 TABLET | Refills: 3 | Status: SHIPPED | OUTPATIENT
Start: 2022-11-18 | End: 2022-12-01

## 2022-11-18 NOTE — TELEPHONE ENCOUNTER
Susan is a recent liver transplant patient who discharged on from ARU on 11/17/2022    The spouse (Rivera) will be responsible for managing medications.    Patient should have been given transplant supplies including 7 day pill organizer, thermometer, and BP monitor by ARU along with medications.      Patient chooses to receive medications from  specialty pharmacy.     HEALTH BENEFIT: ([facility])  ID# [id number] GRP# [id number]  (EFFECTIVE (DATE:9/1/2022) )      PHARMACY BENEFIT: ([facility] [employer])  PROCESSING INFO: ID# [id number] GRP# [id number] PCN# [id number] BIN# [id number] (EFFECTIVE (DATE: 9/1/2022) ).   DEDUCTIBLE (0) & MAX OUT-OF-POCKET (3000)  COPAY STRUCTURE:  $ (10) FOR TIER 1  $ (25) FOR TIER 2  $ (50) FOR TIER 3     TEST CLAIM SPECIALTY #28     MYCOPHENOLATE 250mg =$10  PROGRAF 1mg =$50  TACROLIMUS 1mg =$10  CYCLOSPORINE 100mg =$10  VALGANCICLOVIR 450mg =$10  VALACYCLOVIR 1gm =$10        Zach Conde, Pharm.D.  Formerly [facility]  [phone]

## 2022-11-18 NOTE — TELEPHONE ENCOUNTER
Writer was informed that patient contacted TCU unit because she was unable to schedule OP appointment at [facility] physical therapy clinic in [place] because they did not have therapy referrals. Per therapy supervisor, refferals are listed in the AVS, and supervisor stated they received communication that patient preferred to schedule their own therapy follow up appointment. Writer reviewed avs, and referral was listed with preferred agency as [facility]; which was not patients preferred agency. Writer did not see a specific order signed by MD listed within avs.     Writer did print off refferal orders for PT/OT, and the facesheet, and requested that therapy scheduling fax this information to the [facility] physical therapy clinic in [place]. Writer provided phone number to contact this clinic to obtain fax number and alert them of the new referral.    Writer called back patient to relay that the orders for PT and OT were faxed to preferred clinic, and that if patient was unable to schedule this appointment on Monday, please call back so writer can follow up.     Bhavana Latham   Patient Care Management Coordinator  Acute Rehabilitation Unit/ Transitional Care Unit.   Ph: [phone]

## 2022-11-18 NOTE — TELEPHONE ENCOUNTER
Post Discharge Liver Transplant Phone Call (within 1-3 business days post discharge)      Reviewed with the patient the Transplant Center contact information:  Best phone contact is [phone] Monday-Friday from 8am-5pm.   *You may have to leave a message and get a call back.    Good alternative communication method is via [employer] message.    Coordinators are available 8am-5pm M-F, otherwise there will be an on-call RN available 24/7  *If calling after hours, please call [phone] and ask to speak with the on-call Transplant Coordinator    When to contact the Transplant Center:  - Fever > 100.5F  - Dizziness, lightheadedness  - Severe pain  - If you are unable to take your immunosuppression medications.  - Unable to obtain refill on immunosuppressive medications    Medications:  Reviewed medications with patient.   - confirmed pt has supply of meds  - MAR is up to date   - Verify preferred pharmacy in [place]  - Reminded patient to always contact the pharmacy first for refills    Confirmed the patient has an up to date medication card at home and is knowledgeable in updating their med card (or has someone to assist in this).   - Reinforced patient always bring med card to appointment      Labs:  Labs are expected to be drawn on Monday and Thursday until you are told differently by your transplant team.   - confirmed patient's preferred lab and updated EPIC   - Take a copy of your lab letter with to each lab draw   - Lab order sent directly to patient via [employer] or mail.    - Confirmed patient has a copy of lab letter  - Review how to review lab results on [employer] or obtaining and recording lab values in handbook    Vitals:  Encouraged the patient to record the following:  - BP - daily unless light headed  - Temperature - daily  - Weight - daily    Follow Up:    Role of the Transplant Coordinator vs LPN was reviewed. Contact information provided for both.     Reviewed current scheduled follow up appointments with  surgeon.      Reminded the patient to follow up with PCP within 1 -2 weeks for general follow-up and management of diabetes, pain, and blood pressure    STENT REMOVAL - reminded patient that if stent was placed at time of transplant (this is indicated on the check list) this will need to come out 2-3 mos post transplant.  Asked patient to notify transplant coordinator if sees stent in stool.    Has the patient been contacted with initial visit from HOME CARE? No  o If not, Transplant Coordinator to be notified to follow up with Home Care  o [facility] will be there lab of choice. Suggested scheduling twice weekly for 8 weeks.   o Pt will use [facility] for meds.     Ariana Liao LPN

## 2022-11-19 ENCOUNTER — APPOINTMENT (OUTPATIENT)
Dept: ULTRASOUND IMAGING | Facility: CLINIC | Age: 52
DRG: 948 | End: 2022-11-19
Attending: SURGERY
Payer: COMMERCIAL

## 2022-11-19 ENCOUNTER — HOSPITAL ENCOUNTER (INPATIENT)
Facility: CLINIC | Age: 52
LOS: 2 days | Discharge: HOME | DRG: 948 | End: 2022-11-21
Attending: EMERGENCY MEDICINE | Admitting: SURGERY
Payer: COMMERCIAL

## 2022-11-19 ENCOUNTER — APPOINTMENT (OUTPATIENT)
Dept: CARDIOLOGY | Facility: CLINIC | Age: 52
DRG: 948 | End: 2022-11-19
Attending: SURGERY
Payer: COMMERCIAL

## 2022-11-19 ENCOUNTER — APPOINTMENT (OUTPATIENT)
Dept: ULTRASOUND IMAGING | Facility: CLINIC | Age: 52
DRG: 948 | End: 2022-11-19
Attending: EMERGENCY MEDICINE
Payer: COMMERCIAL

## 2022-11-19 ENCOUNTER — APPOINTMENT (OUTPATIENT)
Dept: GENERAL RADIOLOGY | Facility: CLINIC | Age: 52
DRG: 948 | End: 2022-11-19
Attending: EMERGENCY MEDICINE
Payer: COMMERCIAL

## 2022-11-19 DIAGNOSIS — Z94.4 LIVER TRANSPLANTED (H): Primary | ICD-10-CM

## 2022-11-19 DIAGNOSIS — E87.70 HYPERVOLEMIA, UNSPECIFIED HYPERVOLEMIA TYPE: ICD-10-CM

## 2022-11-19 DIAGNOSIS — R60.0 LOCALIZED EDEMA: ICD-10-CM

## 2022-11-19 DIAGNOSIS — R60.0 BILATERAL LOWER EXTREMITY EDEMA: ICD-10-CM

## 2022-11-19 DIAGNOSIS — N30.00 ACUTE CYSTITIS WITHOUT HEMATURIA: ICD-10-CM

## 2022-11-19 DIAGNOSIS — Z94.4 STATUS POST LIVER TRANSPLANTATION (H): ICD-10-CM

## 2022-11-19 DIAGNOSIS — Z20.822 CONTACT WITH AND (SUSPECTED) EXPOSURE TO COVID-19: ICD-10-CM

## 2022-11-19 DIAGNOSIS — R52 PAIN: ICD-10-CM

## 2022-11-19 DIAGNOSIS — M79.604 BILATERAL LEG PAIN: ICD-10-CM

## 2022-11-19 DIAGNOSIS — Z94.4 LIVER REPLACED BY TRANSPLANT (H): ICD-10-CM

## 2022-11-19 DIAGNOSIS — M79.605 BILATERAL LEG PAIN: ICD-10-CM

## 2022-11-19 LAB
ALBUMIN SERPL BCG-MCNC: 2.8 G/DL (ref 3.5–5.2)
ALBUMIN UR-MCNC: 20 MG/DL
ALP SERPL-CCNC: 118 U/L (ref 35–104)
ALT SERPL W P-5'-P-CCNC: 16 U/L (ref 10–35)
ANION GAP SERPL CALCULATED.3IONS-SCNC: 10 MMOL/L (ref 7–15)
APPEARANCE UR: ABNORMAL
AST SERPL W P-5'-P-CCNC: 15 U/L (ref 10–35)
BACTERIA #/AREA URNS HPF: ABNORMAL /HPF
BASOPHILS # BLD AUTO: 0.1 10E3/UL (ref 0–0.2)
BASOPHILS NFR BLD AUTO: 2 %
BILIRUB SERPL-MCNC: 0.4 MG/DL
BILIRUB UR QL STRIP: NEGATIVE
BUN SERPL-MCNC: 13.5 MG/DL (ref 6–20)
CALCIUM SERPL-MCNC: 8.4 MG/DL (ref 8.6–10)
CHLORIDE SERPL-SCNC: 106 MMOL/L (ref 98–107)
CK SERPL-CCNC: 29 U/L (ref 26–192)
COLOR UR AUTO: YELLOW
CREAT SERPL-MCNC: 1.18 MG/DL (ref 0.51–0.95)
DEPRECATED HCO3 PLAS-SCNC: 20 MMOL/L (ref 22–29)
EOSINOPHIL # BLD AUTO: 0.1 10E3/UL (ref 0–0.7)
EOSINOPHIL NFR BLD AUTO: 4 %
ERYTHROCYTE [DISTWIDTH] IN BLOOD BY AUTOMATED COUNT: 15.6 % (ref 10–15)
GFR SERPL CREATININE-BSD FRML MDRD: 56 ML/MIN/1.73M2
GLUCOSE BLDC GLUCOMTR-MCNC: 118 MG/DL (ref 70–99)
GLUCOSE BLDC GLUCOMTR-MCNC: 135 MG/DL (ref 70–99)
GLUCOSE BLDC GLUCOMTR-MCNC: 194 MG/DL (ref 70–99)
GLUCOSE SERPL-MCNC: 181 MG/DL (ref 70–99)
GLUCOSE UR STRIP-MCNC: NEGATIVE MG/DL
HCT VFR BLD AUTO: 24.2 % (ref 35–47)
HGB BLD-MCNC: 7.6 G/DL (ref 11.7–15.7)
HGB UR QL STRIP: NEGATIVE
HOLD SPECIMEN: NORMAL
HYALINE CASTS: 17 /LPF
IMM GRANULOCYTES # BLD: 0 10E3/UL
IMM GRANULOCYTES NFR BLD: 1 %
KETONES UR STRIP-MCNC: NEGATIVE MG/DL
LEUKOCYTE ESTERASE UR QL STRIP: ABNORMAL
LVEF ECHO: NORMAL
LYMPHOCYTES # BLD AUTO: 0.6 10E3/UL (ref 0.8–5.3)
LYMPHOCYTES NFR BLD AUTO: 20 %
MAGNESIUM SERPL-MCNC: 1.7 MG/DL (ref 1.7–2.3)
MCH RBC QN AUTO: 30.8 PG (ref 26.5–33)
MCHC RBC AUTO-ENTMCNC: 31.4 G/DL (ref 31.5–36.5)
MCV RBC AUTO: 98 FL (ref 78–100)
MONOCYTES # BLD AUTO: 0.2 10E3/UL (ref 0–1.3)
MONOCYTES NFR BLD AUTO: 7 %
MUCOUS THREADS #/AREA URNS LPF: PRESENT /LPF
NEUTROPHILS # BLD AUTO: 2.1 10E3/UL (ref 1.6–8.3)
NEUTROPHILS NFR BLD AUTO: 66 %
NITRATE UR QL: POSITIVE
NRBC # BLD AUTO: 0 10E3/UL
NRBC BLD AUTO-RTO: 0 /100
NT-PROBNP SERPL-MCNC: 825 PG/ML (ref 0–900)
PH UR STRIP: 5.5 [PH] (ref 5–7)
PHOSPHATE SERPL-MCNC: 4.3 MG/DL (ref 2.5–4.5)
PLATELET # BLD AUTO: 361 10E3/UL (ref 150–450)
POTASSIUM SERPL-SCNC: 4.7 MMOL/L (ref 3.4–5.3)
PROT SERPL-MCNC: 5 G/DL (ref 6.4–8.3)
RBC # BLD AUTO: 2.47 10E6/UL (ref 3.8–5.2)
RBC URINE: 3 /HPF
SARS-COV-2 RNA RESP QL NAA+PROBE: NEGATIVE
SODIUM SERPL-SCNC: 136 MMOL/L (ref 136–145)
SP GR UR STRIP: 1.02 (ref 1–1.03)
SQUAMOUS EPITHELIAL: 2 /HPF
TRANSITIONAL EPI: <1 /HPF
UROBILINOGEN UR STRIP-MCNC: NORMAL MG/DL
VIT B12 SERPL-MCNC: 966 PG/ML (ref 232–1245)
WBC # BLD AUTO: 3.1 10E3/UL (ref 4–11)
WBC CLUMPS #/AREA URNS HPF: PRESENT /HPF
WBC URINE: >182 /HPF

## 2022-11-19 PROCEDURE — 36415 COLL VENOUS BLD VENIPUNCTURE: CPT | Performed by: EMERGENCY MEDICINE

## 2022-11-19 PROCEDURE — 258N000003 HC RX IP 258 OP 636: Performed by: NURSE PRACTITIONER

## 2022-11-19 PROCEDURE — 250N000011 HC RX IP 250 OP 636: Performed by: SURGERY

## 2022-11-19 PROCEDURE — 250N000011 HC RX IP 250 OP 636: Performed by: EMERGENCY MEDICINE

## 2022-11-19 PROCEDURE — 250N000013 HC RX MED GY IP 250 OP 250 PS 637

## 2022-11-19 PROCEDURE — 71046 X-RAY EXAM CHEST 2 VIEWS: CPT

## 2022-11-19 PROCEDURE — 93970 EXTREMITY STUDY: CPT

## 2022-11-19 PROCEDURE — 85025 COMPLETE CBC W/AUTO DIFF WBC: CPT | Performed by: EMERGENCY MEDICINE

## 2022-11-19 PROCEDURE — 93975 VASCULAR STUDY: CPT

## 2022-11-19 PROCEDURE — 96375 TX/PRO/DX INJ NEW DRUG ADDON: CPT | Performed by: EMERGENCY MEDICINE

## 2022-11-19 PROCEDURE — 83880 ASSAY OF NATRIURETIC PEPTIDE: CPT | Performed by: EMERGENCY MEDICINE

## 2022-11-19 PROCEDURE — 96365 THER/PROPH/DIAG IV INF INIT: CPT | Performed by: EMERGENCY MEDICINE

## 2022-11-19 PROCEDURE — 93970 EXTREMITY STUDY: CPT | Mod: 26 | Performed by: RADIOLOGY

## 2022-11-19 PROCEDURE — 120N000011 HC R&B TRANSPLANT UMMC

## 2022-11-19 PROCEDURE — 250N000013 HC RX MED GY IP 250 OP 250 PS 637: Performed by: NURSE PRACTITIONER

## 2022-11-19 PROCEDURE — 84100 ASSAY OF PHOSPHORUS: CPT | Performed by: EMERGENCY MEDICINE

## 2022-11-19 PROCEDURE — 80053 COMPREHEN METABOLIC PANEL: CPT | Performed by: EMERGENCY MEDICINE

## 2022-11-19 PROCEDURE — 81001 URINALYSIS AUTO W/SCOPE: CPT | Performed by: EMERGENCY MEDICINE

## 2022-11-19 PROCEDURE — C9803 HOPD COVID-19 SPEC COLLECT: HCPCS | Performed by: EMERGENCY MEDICINE

## 2022-11-19 PROCEDURE — 71046 X-RAY EXAM CHEST 2 VIEWS: CPT | Mod: 26 | Performed by: RADIOLOGY

## 2022-11-19 PROCEDURE — 82550 ASSAY OF CK (CPK): CPT | Performed by: EMERGENCY MEDICINE

## 2022-11-19 PROCEDURE — 250N000012 HC RX MED GY IP 250 OP 636 PS 637: Performed by: SURGERY

## 2022-11-19 PROCEDURE — 83735 ASSAY OF MAGNESIUM: CPT | Performed by: EMERGENCY MEDICINE

## 2022-11-19 PROCEDURE — 93306 TTE W/DOPPLER COMPLETE: CPT

## 2022-11-19 PROCEDURE — 250N000012 HC RX MED GY IP 250 OP 636 PS 637: Performed by: NURSE PRACTITIONER

## 2022-11-19 PROCEDURE — 99285 EMERGENCY DEPT VISIT HI MDM: CPT | Mod: 25 | Performed by: EMERGENCY MEDICINE

## 2022-11-19 PROCEDURE — 999N000285 HC STATISTIC VASC ACCESS LAB DRAW WITH PIV START

## 2022-11-19 PROCEDURE — 93306 TTE W/DOPPLER COMPLETE: CPT | Mod: 26 | Performed by: INTERNAL MEDICINE

## 2022-11-19 PROCEDURE — 87186 SC STD MICRODIL/AGAR DIL: CPT | Performed by: EMERGENCY MEDICINE

## 2022-11-19 PROCEDURE — 82607 VITAMIN B-12: CPT | Performed by: NURSE PRACTITIONER

## 2022-11-19 PROCEDURE — U0005 INFEC AGEN DETEC AMPLI PROBE: HCPCS | Performed by: EMERGENCY MEDICINE

## 2022-11-19 PROCEDURE — 250N000011 HC RX IP 250 OP 636: Performed by: NURSE PRACTITIONER

## 2022-11-19 PROCEDURE — 99285 EMERGENCY DEPT VISIT HI MDM: CPT | Performed by: EMERGENCY MEDICINE

## 2022-11-19 PROCEDURE — 93975 VASCULAR STUDY: CPT | Mod: 26 | Performed by: RADIOLOGY

## 2022-11-19 PROCEDURE — 999N000248 HC STATISTIC IV INSERT WITH US BY RN

## 2022-11-19 RX ORDER — HYDROMORPHONE HYDROCHLORIDE 1 MG/ML
0.5 INJECTION, SOLUTION INTRAMUSCULAR; INTRAVENOUS; SUBCUTANEOUS ONCE
Status: COMPLETED | OUTPATIENT
Start: 2022-11-19 | End: 2022-11-19

## 2022-11-19 RX ORDER — VALGANCICLOVIR 450 MG/1
450 TABLET, FILM COATED ORAL DAILY
Status: DISCONTINUED | OUTPATIENT
Start: 2022-11-20 | End: 2022-11-21 | Stop reason: HOSPADM

## 2022-11-19 RX ORDER — PROCHLORPERAZINE 25 MG
25 SUPPOSITORY, RECTAL RECTAL EVERY 12 HOURS PRN
Status: DISCONTINUED | OUTPATIENT
Start: 2022-11-19 | End: 2022-11-21 | Stop reason: HOSPADM

## 2022-11-19 RX ORDER — CEFTRIAXONE 1 G/1
1 INJECTION, POWDER, FOR SOLUTION INTRAMUSCULAR; INTRAVENOUS ONCE
Status: DISCONTINUED | OUTPATIENT
Start: 2022-11-19 | End: 2022-11-19

## 2022-11-19 RX ORDER — BUMETANIDE 0.25 MG/ML
2 INJECTION INTRAMUSCULAR; INTRAVENOUS ONCE
Status: COMPLETED | OUTPATIENT
Start: 2022-11-19 | End: 2022-11-19

## 2022-11-19 RX ORDER — POLYETHYLENE GLYCOL 3350 17 G/17G
17 POWDER, FOR SOLUTION ORAL DAILY
Status: DISCONTINUED | OUTPATIENT
Start: 2022-11-20 | End: 2022-11-21 | Stop reason: HOSPADM

## 2022-11-19 RX ORDER — NALOXONE HYDROCHLORIDE 0.4 MG/ML
0.2 INJECTION, SOLUTION INTRAMUSCULAR; INTRAVENOUS; SUBCUTANEOUS
Status: DISCONTINUED | OUTPATIENT
Start: 2022-11-19 | End: 2022-11-21 | Stop reason: HOSPADM

## 2022-11-19 RX ORDER — TOPIRAMATE 50 MG/1
50 TABLET, FILM COATED ORAL DAILY
Status: DISCONTINUED | OUTPATIENT
Start: 2022-11-20 | End: 2022-11-21 | Stop reason: HOSPADM

## 2022-11-19 RX ORDER — NALOXONE HYDROCHLORIDE 0.4 MG/ML
0.4 INJECTION, SOLUTION INTRAMUSCULAR; INTRAVENOUS; SUBCUTANEOUS
Status: DISCONTINUED | OUTPATIENT
Start: 2022-11-19 | End: 2022-11-21 | Stop reason: HOSPADM

## 2022-11-19 RX ORDER — SENNOSIDES 8.6 MG
2 TABLET ORAL 2 TIMES DAILY
Status: DISCONTINUED | OUTPATIENT
Start: 2022-11-19 | End: 2022-11-21 | Stop reason: HOSPADM

## 2022-11-19 RX ORDER — DIPHENHYDRAMINE HCL 25 MG
25 CAPSULE ORAL EVERY 6 HOURS PRN
Status: DISCONTINUED | OUTPATIENT
Start: 2022-11-19 | End: 2022-11-21 | Stop reason: HOSPADM

## 2022-11-19 RX ORDER — DEXTROSE MONOHYDRATE 25 G/50ML
25-50 INJECTION, SOLUTION INTRAVENOUS
Status: DISCONTINUED | OUTPATIENT
Start: 2022-11-19 | End: 2022-11-21 | Stop reason: HOSPADM

## 2022-11-19 RX ORDER — NYSTATIN 100000/ML
500000 SUSPENSION, ORAL (FINAL DOSE FORM) ORAL 4 TIMES DAILY
Status: DISCONTINUED | OUTPATIENT
Start: 2022-11-19 | End: 2022-11-19

## 2022-11-19 RX ORDER — HYDROMORPHONE HCL IN WATER/PF 6 MG/30 ML
0.2 PATIENT CONTROLLED ANALGESIA SYRINGE INTRAVENOUS
Status: DISCONTINUED | OUTPATIENT
Start: 2022-11-19 | End: 2022-11-19

## 2022-11-19 RX ORDER — OXYCODONE HYDROCHLORIDE 5 MG/1
5 TABLET ORAL EVERY 6 HOURS PRN
Status: DISCONTINUED | OUTPATIENT
Start: 2022-11-19 | End: 2022-11-19

## 2022-11-19 RX ORDER — MAGNESIUM OXIDE 400 MG/1
400 TABLET ORAL 2 TIMES DAILY
Status: DISCONTINUED | OUTPATIENT
Start: 2022-11-19 | End: 2022-11-21 | Stop reason: HOSPADM

## 2022-11-19 RX ORDER — PROCHLORPERAZINE MALEATE 5 MG
10 TABLET ORAL EVERY 6 HOURS PRN
Status: DISCONTINUED | OUTPATIENT
Start: 2022-11-19 | End: 2022-11-21 | Stop reason: HOSPADM

## 2022-11-19 RX ORDER — HYDROMORPHONE HYDROCHLORIDE 2 MG/1
2 TABLET ORAL EVERY 4 HOURS PRN
Status: DISCONTINUED | OUTPATIENT
Start: 2022-11-19 | End: 2022-11-21 | Stop reason: HOSPADM

## 2022-11-19 RX ORDER — CYANOCOBALAMIN 1000 UG/ML
1000 INJECTION, SOLUTION INTRAMUSCULAR; SUBCUTANEOUS ONCE
Status: COMPLETED | OUTPATIENT
Start: 2022-11-19 | End: 2022-11-19

## 2022-11-19 RX ORDER — FOLIC ACID 1 MG/1
1 TABLET ORAL EVERY MORNING
Status: DISCONTINUED | OUTPATIENT
Start: 2022-11-20 | End: 2022-11-21 | Stop reason: HOSPADM

## 2022-11-19 RX ORDER — ASPIRIN 81 MG/1
81 TABLET, CHEWABLE ORAL DAILY
Status: DISCONTINUED | OUTPATIENT
Start: 2022-11-20 | End: 2022-11-21 | Stop reason: HOSPADM

## 2022-11-19 RX ORDER — DAPSONE 25 MG/1
50 TABLET ORAL DAILY
Status: DISCONTINUED | OUTPATIENT
Start: 2022-11-20 | End: 2022-11-21 | Stop reason: HOSPADM

## 2022-11-19 RX ORDER — DIPHENHYDRAMINE HYDROCHLORIDE 50 MG/ML
25 INJECTION INTRAMUSCULAR; INTRAVENOUS EVERY 6 HOURS PRN
Status: DISCONTINUED | OUTPATIENT
Start: 2022-11-19 | End: 2022-11-21 | Stop reason: HOSPADM

## 2022-11-19 RX ORDER — LEVOTHYROXINE SODIUM 75 UG/1
150 TABLET ORAL
Status: DISCONTINUED | OUTPATIENT
Start: 2022-11-20 | End: 2022-11-21 | Stop reason: HOSPADM

## 2022-11-19 RX ORDER — CEFEPIME HYDROCHLORIDE 1 G/1
1 INJECTION, POWDER, FOR SOLUTION INTRAMUSCULAR; INTRAVENOUS EVERY 12 HOURS
Status: DISCONTINUED | OUTPATIENT
Start: 2022-11-19 | End: 2022-11-21

## 2022-11-19 RX ORDER — PANTOPRAZOLE SODIUM 40 MG/1
40 TABLET, DELAYED RELEASE ORAL
Status: DISCONTINUED | OUTPATIENT
Start: 2022-11-20 | End: 2022-11-21 | Stop reason: HOSPADM

## 2022-11-19 RX ORDER — LIDOCAINE 40 MG/G
CREAM TOPICAL
Status: DISCONTINUED | OUTPATIENT
Start: 2022-11-19 | End: 2022-11-21 | Stop reason: HOSPADM

## 2022-11-19 RX ORDER — MYCOPHENOLATE MOFETIL 250 MG/1
750 CAPSULE ORAL
Status: DISCONTINUED | OUTPATIENT
Start: 2022-11-19 | End: 2022-11-21 | Stop reason: HOSPADM

## 2022-11-19 RX ORDER — ACETAMINOPHEN 325 MG/1
975 TABLET ORAL EVERY 8 HOURS PRN
Status: DISCONTINUED | OUTPATIENT
Start: 2022-11-19 | End: 2022-11-19

## 2022-11-19 RX ORDER — TRAZODONE HYDROCHLORIDE 50 MG/1
50 TABLET, FILM COATED ORAL AT BEDTIME
Status: DISCONTINUED | OUTPATIENT
Start: 2022-11-19 | End: 2022-11-21 | Stop reason: HOSPADM

## 2022-11-19 RX ORDER — ONDANSETRON 4 MG/1
4 TABLET, ORALLY DISINTEGRATING ORAL EVERY 6 HOURS PRN
Status: DISCONTINUED | OUTPATIENT
Start: 2022-11-19 | End: 2022-11-21 | Stop reason: HOSPADM

## 2022-11-19 RX ORDER — NICOTINE POLACRILEX 4 MG
15-30 LOZENGE BUCCAL
Status: DISCONTINUED | OUTPATIENT
Start: 2022-11-19 | End: 2022-11-21 | Stop reason: HOSPADM

## 2022-11-19 RX ORDER — GABAPENTIN 100 MG/1
200 CAPSULE ORAL 2 TIMES DAILY
Status: DISCONTINUED | OUTPATIENT
Start: 2022-11-19 | End: 2022-11-19

## 2022-11-19 RX ORDER — GABAPENTIN 100 MG/1
200 CAPSULE ORAL 3 TIMES DAILY
Status: DISCONTINUED | OUTPATIENT
Start: 2022-11-19 | End: 2022-11-21 | Stop reason: HOSPADM

## 2022-11-19 RX ORDER — ONDANSETRON 2 MG/ML
4 INJECTION INTRAMUSCULAR; INTRAVENOUS EVERY 6 HOURS PRN
Status: DISCONTINUED | OUTPATIENT
Start: 2022-11-19 | End: 2022-11-21 | Stop reason: HOSPADM

## 2022-11-19 RX ORDER — SIMETHICONE 80 MG
80 TABLET,CHEWABLE ORAL EVERY 6 HOURS PRN
Status: DISCONTINUED | OUTPATIENT
Start: 2022-11-19 | End: 2022-11-21 | Stop reason: HOSPADM

## 2022-11-19 RX ORDER — ESCITALOPRAM OXALATE 10 MG/1
10 TABLET ORAL DAILY
Status: DISCONTINUED | OUTPATIENT
Start: 2022-11-20 | End: 2022-11-21 | Stop reason: HOSPADM

## 2022-11-19 RX ORDER — ACETAMINOPHEN 325 MG/1
975 TABLET ORAL EVERY 8 HOURS
Status: DISCONTINUED | OUTPATIENT
Start: 2022-11-19 | End: 2022-11-21 | Stop reason: HOSPADM

## 2022-11-19 RX ORDER — LANOLIN ALCOHOL/MO/W.PET/CERES
1000 CREAM (GRAM) TOPICAL
Status: DISCONTINUED | OUTPATIENT
Start: 2022-11-26 | End: 2022-11-21 | Stop reason: HOSPADM

## 2022-11-19 RX ORDER — PEDI MULTIVIT NO.128/VITAMIN K 500 MCG/ML
1 LIQUID (ML) ORAL DAILY
Status: DISCONTINUED | OUTPATIENT
Start: 2022-11-20 | End: 2022-11-21 | Stop reason: HOSPADM

## 2022-11-19 RX ADMIN — CEFEPIME HYDROCHLORIDE 1 G: 1 INJECTION, POWDER, FOR SOLUTION INTRAMUSCULAR; INTRAVENOUS at 11:58

## 2022-11-19 RX ADMIN — INSULIN ASPART 3 UNITS: 100 INJECTION, SOLUTION INTRAVENOUS; SUBCUTANEOUS at 18:29

## 2022-11-19 RX ADMIN — MAGNESIUM OXIDE TAB 400 MG (241.3 MG ELEMENTAL MG) 400 MG: 400 (241.3 MG) TAB at 20:37

## 2022-11-19 RX ADMIN — ACETAMINOPHEN 975 MG: 325 TABLET, FILM COATED ORAL at 20:37

## 2022-11-19 RX ADMIN — HYDROMORPHONE HYDROCHLORIDE 0.5 MG: 1 INJECTION, SOLUTION INTRAMUSCULAR; INTRAVENOUS; SUBCUTANEOUS at 05:57

## 2022-11-19 RX ADMIN — BUMETANIDE 2 MG: 0.25 INJECTION, SOLUTION INTRAMUSCULAR; INTRAVENOUS at 11:55

## 2022-11-19 RX ADMIN — GABAPENTIN 200 MG: 100 CAPSULE ORAL at 14:28

## 2022-11-19 RX ADMIN — HYDROMORPHONE HYDROCHLORIDE 2 MG: 2 TABLET ORAL at 22:24

## 2022-11-19 RX ADMIN — HYDROMORPHONE HYDROCHLORIDE 0.2 MG: 0.2 INJECTION, SOLUTION INTRAMUSCULAR; INTRAVENOUS; SUBCUTANEOUS at 13:06

## 2022-11-19 RX ADMIN — MYCOPHENOLATE MOFETIL 750 MG: 250 CAPSULE ORAL at 17:07

## 2022-11-19 RX ADMIN — GABAPENTIN 200 MG: 100 CAPSULE ORAL at 20:37

## 2022-11-19 RX ADMIN — TACROLIMUS 6 MG: 5 CAPSULE ORAL at 17:07

## 2022-11-19 RX ADMIN — CYANOCOBALAMIN 1000 MCG: 1000 INJECTION, SOLUTION INTRAMUSCULAR at 14:09

## 2022-11-19 RX ADMIN — PANCRELIPASE 2 CAPSULE: 24000; 76000; 120000 CAPSULE, DELAYED RELEASE PELLETS ORAL at 17:06

## 2022-11-19 RX ADMIN — FUROSEMIDE 10 MG/HR: 10 INJECTION, SOLUTION INTRAMUSCULAR; INTRAVENOUS at 14:09

## 2022-11-19 ASSESSMENT — ACTIVITIES OF DAILY LIVING (ADL)
DRESSING/BATHING_DIFFICULTY: YES
TRANSFERRING: 1-->ASSISTANCE (EQUIPMENT/PERSON) NEEDED (NOT DEVELOPMENTALLY APPROPRIATE)
TOILETING: 0-->INDEPENDENT
DIFFICULTY_EATING/SWALLOWING: NO
ADLS_ACUITY_SCORE: 41
EQUIPMENT_CURRENTLY_USED_AT_HOME: CANE, STRAIGHT;WALKER, ROLLING
ADLS_ACUITY_SCORE: 43
DRESS: 0-->ASSISTANCE NEEDED (DEVELOPMENTALLY APPROPRIATE)
HEARING_DIFFICULTY_OR_DEAF: NO
DOING_ERRANDS_INDEPENDENTLY_DIFFICULTY: NO
VISION_MANAGEMENT: GLASSES
TRANSFERRING: 1-->ASSISTANCE (EQUIPMENT/PERSON) NEEDED
WALKING_OR_CLIMBING_STAIRS_DIFFICULTY: YES
TOILETING: 0-->INDEPENDENT
TOILETING_ISSUES: NO
ADLS_ACUITY_SCORE: 39
ADLS_ACUITY_SCORE: 36
CONCENTRATING,_REMEMBERING_OR_MAKING_DECISIONS_DIFFICULTY: NO
ADLS_ACUITY_SCORE: 41
ADLS_ACUITY_SCORE: 39
ADLS_ACUITY_SCORE: 43
DRESS: 1-->ASSISTANCE (EQUIPMENT/PERSON) NEEDED
ADLS_ACUITY_SCORE: 41
BATHING: 0-->INDEPENDENT
CHANGE_IN_FUNCTIONAL_STATUS_SINCE_ONSET_OF_CURRENT_ILLNESS/INJURY: NO
ADLS_ACUITY_SCORE: 41
WEAR_GLASSES_OR_BLIND: YES
FALL_HISTORY_WITHIN_LAST_SIX_MONTHS: NO
ADLS_ACUITY_SCORE: 41
DIFFICULTY_COMMUNICATING: NO
WALKING_OR_CLIMBING_STAIRS: AMBULATION DIFFICULTY, ASSISTANCE 1 PERSON
DRESSING/BATHING: DRESSING DIFFICULTY, ASSISTANCE 1 PERSON;BATHING DIFFICULTY, REQUIRES EQUIPMENT

## 2022-11-19 NOTE — H&P
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 She is s/p OLTx 11/2/22 with Dr. Delbert Morgan. Discharged home from TCU 11/17. Presented to ED today with c/o intolerable burning pain and swelling in bilateral lower extremities.     S/p DDLT 11/2/22: POD#17. LFTs WNL. US patent with improved RIs. Continue ASA 81 mg daily.     Immunosuppression management:  -Tacrolimus 6 mg BID. Goal level 8-12.    - mg BID     Neurology/Psych:  Acute BLE pain: PRN Tylenol, oxycodone.   Hx Hypoglycemic seizures: PTA Topamax.   Hx Depression: PTA Lexapro, Trazadone.   Hx Migraine: PTA Rizatriptan PRN.     Hematology:   Leukopenia: Likely r/t medications. Monitor.  Acute blood loss/Anemia of chronic disease: Hgb stable ~8. Monitor.    Cardiorespiratory: No acute issues.     GI/Nutrition:   Hx Gastric Bypass; Moderate malnutrition in the context of acute on chronic illness: Regular diet, SAMI multivitamin.   Hx Pancreatic insufficiency: PTA Creon.     Endocrine:   DM2: Restart Lantus 12 units daily, sliding scale Novolog.   Hx Hypothyroidism: PTA Synthroid.     Fluid/Electrolytes/Neph:   Hypomagnesemia: Continue Mag-ox 400 mg BID.  Hypervolemia associated with renal insufficiency, hypoalbuminemia, BLE edema: Burning, bilateral LE pain and edema.   -CK WNL  -TTE  -BLE US  -Increase gabapentin to TID  -Vitamin B12 injection, level ordered  -Bumex 2 mg IV  -Lasix gtt 10 mg/hr    : No acute issues.     Infectious disease:   Possible UTI: UA cloudy, >182 WBC, nitrites. UCx pending. Start cefepime.     Prophylaxis: DVT (mechanical), GI (PPI), fall, viral (Valcyte), pneumocystis (Dapsone)    Disposition: Admit to      ANYA/Fellow/Resident Provider: Bhavana Romo NP [id number]    Faculty:  Foreign Benitez M.D.      Disposition Plan   Expected discharge: Tomorrow, recommended to prior living arrangement once medically stable and pain is manageable.     Entered: Bhavana Romo NP 11/19/2022, 12:56 PM   Information in the above section will display in the discharge planner report.    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 She is s/p OLTx 11/2/22 with Dr. Delbert Morgan. Discharged home from TCU 11/17.     Presented to ED overnight with c/o intolerable burning pain and swelling in bilateral lower extremities. Patient and spouse also report worsening LLE swelling. No other complaints.     Past Medical History    I have reviewed this patient's medical history and updated it with pertinent information if needed.   Past Medical History:   Diagnosis Date     Anemia      Anxiety      Cold sore      Depressive disorder      Diabetes (H)     Type 2 DM/No Insulin      Encephalopathy      Esophageal varices without bleeding (H)     grade I     History of blood transfusion     10/2019     History of seizure     diabetic seizure     Insomnia      Liver cirrhosis secondary to CUETO (H) 05/28/2020     Migraine      Pleural effusion      Thrombocytopenia (H)      Thyroid disease        Past Surgical History   I have reviewed this patient's surgical history and updated it with pertinent information if needed.  Past Surgical History:   Procedure Laterality Date     APPENDECTOMY      1989     BENCH LIVER N/A 11/2/2022    Procedure: Bench liver;  Surgeon: Delbert Morgan MD;  Location: [facility] OR     COLONOSCOPY      1/2020 at [facility]      ENT SURGERY  1998    tempanoplasty     GI SURGERY      EGD August 2020 at [facility]      GYN SURGERY  2010     laparoscopic ablation     IR TRANSVEN INTRAHEPATIC PORTOSYST SHUNT  2021     MAMMOPLASTY REDUCTION BILATERAL       MO STAB PHELBECTOMY VARICOSE VEINS, LESS THAN 10 INCISIONS, ONE EXTREMITY       RNY gastric bypass  2006    retoocolic, retrogastric, [facility]     TONSILLECTOMY & ADENOIDECTOMY Bilateral      TRANSPLANT LIVER RECIPIENT  DONOR N/A 2022    Procedure: TRANSPLANT, LIVER, RECIPIENT,  DONOR;  Surgeon: Delbert Morgan MD;  Location: [facility] OR     WISDOM TEETH EXTRACTION         Prior to Admission Medications   Prior to Admission Medications   Prescriptions Last Dose Informant Patient Reported? Taking?   Continuous Blood Gluc  (DEXCOM G6 ) LUNA  Spouse/Significant Other Yes No   Sig: Use as directed for continuous glucose monitoring.   Continuous Blood Gluc Sensor (DEXCOM G6 SENSOR) MISC  Spouse/Significant Other Yes No   Sig: Use as directed for continuous glucose monitoring.  Change sensor every 10 days.   Continuous Blood Gluc Transmit (DEXCOM G6 TRANSMITTER) MISC  Spouse/Significant Other Yes No   Sig: Use as directed for continuous glucose monitoring.  Change every 3 months.   HYDROmorphone (DILAUDID) 2 MG tablet   No No   Sig: Take 1 tablet (2 mg) by mouth every 6 hours as needed for moderate to severe pain   amylase-lipase-protease (CREON 24) 40012-14510 units CPEP per EC capsule   No No   Sig: Take 2 capsules by mouth 3 times daily (with meals)   amylase-lipase-protease (CREON 24) 90952-64157 units CPEP per EC capsule   No No   Sig: Take 1 capsule by mouth Take with snacks or supplements (with snacks)   aspirin (ASA) 81 MG chewable tablet   No No   Sig: Take 1 tablet (81 mg) by mouth daily   calcium carbonate-vitamin D (OSCAL W/D) 500-200 MG-UNIT tablet  Spouse/Significant Other No No   Sig: Take 1 tablet by mouth 2 times daily (with meals) Take one tab once in AM and once in PM with meals   cyanocobalamin (VITAMIN B-12) 1000 MCG tablet   Spouse/Significant Other Yes No   Sig: Take 1,000 mcg by mouth every 7 days Take 1 tablet by mouth every 7 days on Wednesdays   dapsone (ACZONE) 25 MG tablet   No No   Sig: Take 2 tablets (50 mg) by mouth daily   escitalopram (LEXAPRO) 10 MG tablet  Spouse/Significant Other No No   Sig: Take 1 tablet (10 mg) by mouth daily   folic acid (FOLVITE) 1 MG tablet  Spouse/Significant Other Yes No   Sig: Take 1 mg by mouth every morning   gabapentin (NEURONTIN) 100 MG capsule   No No   Sig: Take 2 capsules (200 mg) by mouth 2 times daily   insulin aspart (NOVOLOG VIAL) 100 UNITS/ML vial   No No   Sig: Inject 1-10 Units Subcutaneous 4 times daily (with meals and nightly)   insulin glargine (LANTUS PEN) 100 UNIT/ML pen   No No   Sig: Inject 12 Units Subcutaneous every morning (before breakfast)   insulin pen needle (BD GRISEL U/F) 32G X 4 MM miscellaneous  Spouse/Significant Other Yes No   Sig: Inject 1 each Subcutaneous   levothyroxine (SYNTHROID/LEVOTHROID) 150 MCG tablet  Spouse/Significant Other Yes No   Sig: Take 1 tablet by mouth daily   magnesium oxide (MAG-OX) 400 MG tablet   No No   Sig: Take 1 tablet (400 mg) by mouth 2 times daily   melatonin 5 MG tablet  Spouse/Significant Other Yes No   Sig: Take 5 mg by mouth At Bedtime   multivitamin CF FORMULA (DEKAS PLUS) capsule  Spouse/Significant Other No No   Sig: Take 1 capsule by mouth daily   mycophenolate (GENERIC EQUIVALENT) 250 MG capsule   No No   Sig: Take 3 capsules (750 mg) by mouth two times daily   nystatin (MYCOSTATIN) 853434 UNIT/ML suspension   No No   Sig: Swish and swallow 5 mLs (500,000 Units) in mouth 4 times daily for 30 days   ondansetron (ZOFRAN ODT) 8 MG ODT tab  Spouse/Significant Other Yes No   Sig: Take 1 tablet by mouth every 8 hours as needed for nausea   pantoprazole (PROTONIX) 40 MG EC tablet   No No   Sig: Take 1 tablet (40 mg) by mouth every morning (before breakfast)   polyethylene glycol (MIRALAX) 17 GM/Dose powder   No No   Sig: Take 17 g  by mouth daily   sennosides (SENOKOT) 8.6 MG tablet   No No   Si tablets by Oral or Feeding Tube route 2 times daily   simethicone (MYLICON) 80 MG chewable tablet   No No   Sig: Take 1 tablet (80 mg) by mouth every 6 hours as needed for cramping   sitagliptin (JANUVIA) 100 MG tablet   No No   Sig: Take 1 tablet (100 mg) by mouth daily   tacrolimus (GENERIC EQUIVALENT) 1 MG capsule   No No   Sig: Take 6 capsules (6 mg) by mouth two times daily   topiramate (TOPAMAX) 50 MG tablet  Spouse/Significant Other Yes No   Sig: Take 50 mg by mouth daily    traZODone (DESYREL) 100 MG tablet  Spouse/Significant Other Yes No   Sig: Take 0.5 tablets (50 mg) by mouth At Bedtime   valGANciclovir (VALCYTE) 450 MG tablet   No No   Sig: Take 1 tablet (450 mg) by mouth daily      Facility-Administered Medications: None     Allergies   Allergies   Allergen Reactions     Methylprednisolone Hives     Per patient, reaction occurred in  or earlier. Broke out in round, flat hives in neck and chest area. No shortness of breath or swelling. Unknown if reaction occurred immediately after administration.      Oxycodone      slurring     Droperidol Anxiety     Nsaids Other (See Comments)     GI bleed.     Prednisone Anxiety, Hives and Rash       Social History   I have reviewed this patient's social history and updated it with pertinent information if needed. Susan Puckett  reports that she has never smoked. She has never used smokeless tobacco. She reports that she does not currently use alcohol. She reports that she does not use drugs.    Family History   I have reviewed this patient's family history and updated it with pertinent information if needed.   Family History   Problem Relation Age of Onset     Anesthesia Reaction Nephew         PONV     Bleeding Disorder No family hx of      Clotting Disorder No family hx of        Review of Systems   The 10 point Review of Systems is negative other than noted in the HPI.     Physical Exam    Temp: 98  F (36.7  C) Temp src: Oral BP: 107/65 Pulse: 74   Resp: 16 SpO2: 91 % O2 Device: Nasal cannula Oxygen Delivery: 2 LPM  Vital Signs with Ranges  Temp:  [98  F (36.7  C)-98.1  F (36.7  C)] 98  F (36.7  C)  Pulse:  [61-74] 74  Resp:  [16-18] 16  BP: ()/(60-76) 107/65  SpO2:  [91 %-98 %] 91 %  197 lbs 0 oz    Constitutional: appears uncomfortable  Eyes: EOMI  Respiratory: unlabored on RA  Cardiovascular: perfused  GI: abdomen soft, non-tender to palpation  Genitourinary: no Rossi present  Skin: warm, dry  Musculoskeletal: pitting lower extremity edema. BLE sensitive to touch.   Neurologic: A&Ox4    Data   Results for orders placed or performed during the hospital encounter of 11/19/22 (from the past 24 hour(s))   [place] Draw    Narrative    The following orders were created for panel order [place] Draw.  Procedure                               Abnormality         Status                     ---------                               -----------         ------                     Extra Blue Top Tube[[id number]]                              Final result               Extra Red Top Tube[[id number]]                               Final result               Extra Green Top (Lithium...[[id number]]                      Final result               Extra Purple Top Tube[[id number]]                            Final result                 Please view results for these tests on the individual orders.   CBC with platelets differential    Narrative    The following orders were created for panel order CBC with platelets differential.  Procedure                               Abnormality         Status                     ---------                               -----------         ------                     CBC with platelets and d...[[id number]]  Abnormal            Final result                 Please view results for these tests on the individual orders.   Comprehensive metabolic panel   Result Value Ref Range    Sodium 136  136 - 145 mmol/L    Potassium 4.7 3.4 - 5.3 mmol/L    Chloride 106 98 - 107 mmol/L    Carbon Dioxide (CO2) 20 (L) 22 - 29 mmol/L    Anion Gap 10 7 - 15 mmol/L    Urea Nitrogen 13.5 6.0 - 20.0 mg/dL    Creatinine 1.18 (H) 0.51 - 0.95 mg/dL    Calcium 8.4 (L) 8.6 - 10.0 mg/dL    Glucose 181 (H) 70 - 99 mg/dL    Alkaline Phosphatase 118 (H) 35 - 104 U/L    AST 15 10 - 35 U/L    ALT 16 10 - 35 U/L    Protein Total 5.0 (L) 6.4 - 8.3 g/dL    Albumin 2.8 (L) 3.5 - 5.2 g/dL    Bilirubin Total 0.4 <=1.2 mg/dL    GFR Estimate 56 (L) >60 mL/min/1.73m2   CK total   Result Value Ref Range    CK 29 26 - 192 U/L   Magnesium   Result Value Ref Range    Magnesium 1.7 1.7 - 2.3 mg/dL   Phosphorus   Result Value Ref Range    Phosphorus 4.3 2.5 - 4.5 mg/dL   Extra Blue Top Tube   Result Value Ref Range    Hold Specimen JIC    Extra Red Top Tube   Result Value Ref Range    Hold Specimen JIC    Extra Green Top (Lithium Heparin) Tube   Result Value Ref Range    Hold Specimen JIC    Extra Purple Top Tube   Result Value Ref Range    Hold Specimen JIC    CBC with platelets and differential   Result Value Ref Range    WBC Count 3.1 (L) 4.0 - 11.0 10e3/uL    RBC Count 2.47 (L) 3.80 - 5.20 10e6/uL    Hemoglobin 7.6 (L) 11.7 - 15.7 g/dL    Hematocrit 24.2 (L) 35.0 - 47.0 %    MCV 98 78 - 100 fL    MCH 30.8 26.5 - 33.0 pg    MCHC 31.4 (L) 31.5 - 36.5 g/dL    RDW 15.6 (H) 10.0 - 15.0 %    Platelet Count 361 150 - 450 10e3/uL    % Neutrophils 66 %    % Lymphocytes 20 %    % Monocytes 7 %    % Eosinophils 4 %    % Basophils 2 %    % Immature Granulocytes 1 %    NRBCs per 100 WBC 0 <1 /100    Absolute Neutrophils 2.1 1.6 - 8.3 10e3/uL    Absolute Lymphocytes 0.6 (L) 0.8 - 5.3 10e3/uL    Absolute Monocytes 0.2 0.0 - 1.3 10e3/uL    Absolute Eosinophils 0.1 0.0 - 0.7 10e3/uL    Absolute Basophils 0.1 0.0 - 0.2 10e3/uL    Absolute Immature Granulocytes 0.0 <=0.4 10e3/uL    Absolute NRBCs 0.0 10e3/uL   US Lower Extremity Venous Duplex Bilateral     Narrative    EXAMINATION: DOPPLER VENOUS ULTRASOUND OF BILATERAL LOWER EXTREMITIES,  11/19/2022 9:00 AM     COMPARISON: 11/6/2022    HISTORY: Bilateral lower extremity pain    TECHNIQUE:  Gray-scale evaluation with compression, spectral flow and  color Doppler assessment of the deep venous system of both legs from  groin to knee, and then at the ankles.    FINDINGS:  In both lower extremities, the common femoral, femoral, popliteal and  posterior tibial veins demonstrate normal compressibility and blood  flow.    Subcutaneous edema about the lower extremities.      Impression    IMPRESSION:  No evidence of deep venous thrombosis in either lower extremity.    I have personally reviewed the examination and initial interpretation  and I agree with the findings.    SHAHEEN WAHL,          SYSTEM ID:  [id number]   UA with Microscopic   Result Value Ref Range    Color Urine Yellow Colorless, Straw, Light Yellow, Yellow    Appearance Urine Slightly Cloudy (A) Clear    Glucose Urine Negative Negative mg/dL    Bilirubin Urine Negative Negative    Ketones Urine Negative Negative mg/dL    Specific Gravity Urine 1.017 1.003 - 1.035    Blood Urine Negative Negative    pH Urine 5.5 5.0 - 7.0    Protein Albumin Urine 20 (A) Negative mg/dL    Urobilinogen Urine Normal Normal, 2.0 mg/dL    Nitrite Urine Positive (A) Negative    Leukocyte Esterase Urine Large (A) Negative    Bacteria Urine Few (A) None Seen /HPF    WBC Clumps Urine Present (A) None Seen /HPF    Mucus Urine Present (A) None Seen /LPF    RBC Urine 3 (H) <=2 /HPF    WBC Urine >182 (H) <=5 /HPF    Squamous Epithelials Urine 2 (H) <=1 /HPF    Transitional Epithelials Urine <1 <=1 /HPF    Hyaline Casts Urine 17 (H) <=2 /LPF   Asymptomatic COVID-19 Virus (Coronavirus) by PCR Nasopharyngeal    Specimen: Nasopharyngeal; Swab   Result Value Ref Range    SARS CoV2 PCR Negative Negative    Narrative    Testing was performed using the Xpert Xpress SARS-CoV-2 Assay on the [employer]pert [employer] Systems. Additional information about this Emergency Use Authorization (EUA) assay can be found via the Lab Guide. This test should be ordered for the detection of SARS-CoV-2 in individuals who meet SARS-CoV-2 clinical and/or epidemiological criteria as well as from individuals without symptoms or other reasons to suspect COVID-19. Test performance for asymptomatic patients has only been established in anterior nasal swab specimens. This test is for in vitro diagnostic use under the FDA EUA for laboratories certified under CLIA to perform high complexity testing. This test has not been FDA cleared or approved. A negative result does not rule out the presence of PCR inhibitors in the specimen or target RNA concentration below the limit of detection for the assay. The possibility of a false negative should be considered if the patient's recent exposure or clinical presentation suggests COVID-19. This test was validated by [facility] [employer]. These Laboratories are certified under the Clinical Laboratory Improvement Amendments (CLIA) as qualified to perform high complexity testing.     XR Chest 2 Views    Narrative    Exam: XR CHEST 2 VIEWS, 11/19/2022 11:32 AM    Indication: hypoxia, lower extremity edema    Comparison: 11/2/2022    Findings:   Stable cardiomediastinal silhouette. No appreciable pleural effusion  or pneumothorax. Low lung volumes with crowding of the pulmonary  vasculature. Minimal streaky right basilar opacities with asymmetric  elevation of the right hemidiaphragm. Surgical clips and staples in  the upper abdomen.      Impression    Impression:   1. Minimal streaky right basilar opacities, likely atelectasis given  low lung volumes and mild asymmetric elevation of the right  hemidiaphragm, though infection is not excluded.  2. Previous bilateral mixed interstitial and patchy airspace opacities  seen on 11/2/2022 have resolved.     SHAHEEN WAHL, DO         SYSTEM  ID:  [id number]   US Liver Transplant    Narrative    EXAMINATION: US LIVER TRANSPLANT, 11/19/2022 11:58 AM     COMPARISON: 11/2/2022, 11/3/2022    HISTORY: bilateral lower extremity and abdominal edema with hx of  recent liver transplant    TECHNIQUE:  Gray-scale, color Doppler and spectral flow analysis.    FINDINGS:     Liver:   The liver demonstrates normal homogeneous echotexture. No  evidence of a focal hepatic mass. There is a small amount of fluid  about the falciform ligament is not definitely appreciated on  previous.    Bile Ducts: The intrahepatic biliary system is of normal caliber.  The  common bile duct measures 3 mm.    Gallbladder: The gallbladder is surgically absent.    Kidneys:   Right kidney:  The right kidney demonstrates normal echotexture with  no evidence of a shadowing stone, focal mass or hydronephrosis.   9.5  cm in long axis dimension. The left kidney was not well visualized.    Pancreas: Poorly visualized due to overlying bowel gas.    Spleen: The spleen is enlarged, measuring 15.9 cm in cranial caudal  dimension.    Visualized portions of the aorta are unremarkable.    LIVER DOPPLER:  Splenic vein:  Patent continuous normal antegrade direction flow  towards the liver, 32 cm/sec.  Extrahepatic portal vein:  Patent continuous antegrade flow, 32  cm/sec.  Portal vein at anastomosis: Patent continuous antegrade flow, 38  cm/sec.  Intrahepatic portal vein:  Patent continuous antegrade flow, 35  cm/sec.  Right portal vein flow is antegrade, measuring 32 cm/sec.  Left portal vein flow is antegrade, measuring 26 cm/sec.    Inferior vena cava: patent with flow toward the heart throughout..  IVC above anastomosis:  Not visualized  IVC at anastomosis:  122 cm/sec.  Intrahepatic IVC:  35 cm/sec.  Extrahepatic IVC:  17 cm/sec.    Right, mid, left hepatic veins: Patent with flow towards the inferior  vena cava.    Extrahepatic hepatic artery: High resistance waveform with brisk  upstroke with flow  towards the liver. 45 cm/sec with resistive index  0.69.  Right hepatic artery: 41 cm/sec with resistive index 0.71.  Left hepatic artery: 38 cm/sec with resistive index 0.70.      Impression    Impression:   1.  Normal grayscale appearance of the transplant liver parenchyma.  There is a small area of loculated fluid along the falciform ligament,  presumably evolving postoperative peritransplant hematoma/seroma.  2.  Patent Doppler evaluation of the liver transplant with improved  resistive indices in the hepatic arteries.    I have personally reviewed the examination and initial interpretation  and I agree with the findings.    SHAHEEN WAHL,          SYSTEM ID:  [id number]   Echo Complete   Result Value Ref Range    LVEF  55-60%     Narrative    [id number]  [id number]  [id number]  [id number]^[id number]^JORGE     [facility]  Echocardiography Laboratory  [address] [place] [id number]     Name: SHARMILA CONNER  MRN: [id number]  : 1970  Study Date: 2022 11:56 AM  Age: 52 yrs  Gender: Female  Patient Location: [facility]  Reason For Study: Edema  Ordering Physician: JORGE VARGHESE  Performed By: Debbie Landa     BSA: 2.0 m2  Height: 66 in  Weight: 197 lb  HR: 66  BP: 107/65 mmHg  ______________________________________________________________________________  Procedure  Complete Portable Echo Adult.  ______________________________________________________________________________  Interpretation Summary  Global and regional left ventricular function is normal with an EF of 55-60%.  Right ventricular function, chamber size, wall motion, and thickness are  normal.  No significant valvular abnormalities were noted.  This study was compared with the study from 10/20/20: No significant changes  noted.  ______________________________________________________________________________  Left Ventricle  Global and regional left ventricular function is normal with an EF of 55-60%.  Left  ventricular size is normal. Relative wall thickness is increased  consistent with concentric remodeling. Grade I or early diastolic dysfunction.  Diastolic Doppler findings (E/E' ratio and/or other parameters) suggest left  ventricular filling pressures are normal.     Right Ventricle  Right ventricular function, chamber size, wall motion, and thickness are  normal.     Atria  The right atria appears normal. Moderate left atrial enlargement is present.     Mitral Valve  The mitral valve is normal. Trace mitral insufficiency is present.     Aortic Valve  Aortic valve is normal in structure and function. The aortic valve is  tricuspid.     Tricuspid Valve  Mild tricuspid insufficiency is present. The right ventricular systolic  pressure is approximated at 17.1 mmHg plus the right atrial pressure.     Pulmonic Valve  The pulmonic valve is normal. Trace pulmonic insufficiency is present.     Vessels  The inferior vena cava was normal in size with preserved respiratory  variability. The aorta root is normal. IVC diameter <2.1 cm collapsing >50%  with sniff suggests a normal RA pressure of 3 mmHg.     Pericardium  No pericardial effusion is present.     Compared to Previous Study  This study was compared with the study from 10/20/20 . No significant changes  noted.  ______________________________________________________________________________  MMode/2D Measurements & Calculations     IVSd: 0.77 cm  LVIDd: 4.5 cm  LVIDs: 2.9 cm  LVPWd: 0.98 cm  FS: 34.5 %  LV mass(C)d: 126.5 grams  LV mass(C)dI: 63.7 grams/m2  Ao root diam: 3.2 cm  asc Aorta Diam: 3.9 cm  LA Volume (BP): 85.4 ml  LA Volume Index (BP): 42.9 ml/m2  RWT: 0.44     Doppler Measurements & Calculations  MV E max sally: 50.3 cm/sec  MV A max sally: 63.4 cm/sec  MV E/A: 0.79  MV dec slope: 216.5 cm/sec2  MV dec time: 0.23 sec  TR max sally: 206.6 cm/sec  TR max P.1 mmHg  E/E' av.5  Lateral E/e': 4.1  Medial E/e': 7.0      ______________________________________________________________________________  Report approved by: Scott Knight 11/19/2022 01:15 PM         I have reviewed history, examined patient and discussed plan with the fellow/resident/ANYA.    I concur with the findings in this note.    Time spent on admission activities: 45 minutes.

## 2022-11-19 NOTE — ED NOTES
ED MD notified of PT disposition and fatigue. PT has difficulty answering questions without falling asleep. PT states she has not slept much at home. PT on monitor with stable vital signs.

## 2022-11-19 NOTE — ED PROVIDER NOTES
Sign out Provider: Corin  Sign out Plan: 52-year-old female with a history of Du cirrhosis status post liver transplant on 11/2/2022 presenting with bilateral leg pain and swelling.  Patient is currently pending laboratory evaluation and bilateral lower extremity ultrasounds.  Plan for likely admission to transplant for further evaluation and symptom management.    Reassessment: Laboratory evaluation demonstrates creatinine at baseline at 1.18, no significant electrolyte abnormalities, protein is low at 5.0 as well as albumin at 2.8.  White count is 3.1, hemoglobin of 7.6, similar to prior.  UA is consistent with a UTI and was given cefepime.  Duplex ultrasound shows no evidence of DVT.  Patient was seen and evaluated by transplant and will plan to admit for further evaluation, antibiotics and diuresis.    Disposition: Admit             Amanda Lin MD  11/19/22 4862

## 2022-11-19 NOTE — ED NOTES
PT states having bilateral lower leg edema since 11/2 when she had a liver transplant. PT states pain and tightening of skin of bilateral legs. Pitting edema +2 in bilateral legs and feet. PT states numbness of the right lateral thigh since 11/2. PT is A&Ox3. Breathing is regular and unlabored. Circulation warm and dry. PT on monitor at this time. She states being more tired than normal and not able to walk as much at home.

## 2022-11-19 NOTE — LETTER
Transition Communication Hand-off for Care Transitions to Next Level of Care Provider    Name: Susan Puckett  : 1970  MRN #: [id number]  Primary Care Provider: Harleen Billy     Primary Clinic: [facility] [address] [facility]   [place] [address]     Reason for Hospitalization:  Status post liver transplantation (H) [Z94.4]  Bilateral leg pain [M79.604, M79.605]  Acute cystitis without hematuria [N30.00]  Bilateral lower extremity edema [R60.0]  Admit Date/Time: 2022  4:35 AM  Discharge Date: 22  Payor Source: Payor: [place] / Plan: [employer] / Product Type: HMO /       Discharge Plan: home with OP clinic follow up and labs    Discharge Needs Assessment:  Needs    Flowsheet Row Most Recent Value   Equipment Currently Used at Home cane, straight, wheelchair, manual          Follow-up plan:    Future Appointments   Date Time Provider Department Center   2022  3:00 PM Davi Clayton, [facility]   2022  9:00 AM Onelia Nunes, OT [facility]   2022  8:45 AM [facility]   2022  9:30 AM Alexei Alcaraz MD [facility]   2022  2:00 PM Haily Rodriguez, OTR [facility]   2022  9:30 AM [facility]   2022 10:00 AM Delbert Morgan MD [facility]   12/15/2022  9:00 AM [facility]   12/15/2022  9:30 AM Delbert Morgan MD [facility]   2022  8:15 AM Coni Rowe, PT [facility]   2023  7:00 AM [facility]   2023  7:30 AM Raphael Cook MD [facility]       Any outstanding tests or procedures:        Referrals     Future Labs/Procedures    Adult Mental Health  Referral     Process Instructions:    WE MAKE EVERY ATTEMPT TO SCHEDULE THE PATIENT INTERNALLY; IF CAPACITY IS LIMITED, PATIENTS WILL BE OFFERED REFERRAL TO [facility]. Please see the Psychiatry, Psychological Testing, and Eating Disorders Reference Link  below.    FOR SUBSTANCE USE, PLEASE REVIEW EXCLUSION CRITERIA. Please see the Substance Use Reference Link below.    DBT Clinic Referral Form must be completed when referring to the DBT program. Please see the DBT Clinic Referral Form Reference Link below.     Comments:    Please be aware that coverage of these services is subject to the terms and limitations of your health insurance plan.  Call member services at your health plan with any benefit or coverage questions.  [facility] will call you to coordinate your care as prescribed by your provider. If you don't hear from a representative within 2 business days, please call [phone].              [facility] Recommendations:      Meli Dominguez RN    AVS/Discharge Summary is the source of truth; this is a helpful guide for improved communication of patient story

## 2022-11-19 NOTE — ED TRIAGE NOTES
Patient states her legs are on fire. Her right thigh in the hospital was numb and on fire as well. The back of her knees are super swollen. Her left leg is on fire. Her legs are swollen. Her  gave her a massage and it hurt really bad. She states walking hurts.  VSS   Her legs are swollen, hot, pitting edema, no redness. Was like this in the hospital, now worse.   Her liver transplant was 11/02/2022

## 2022-11-19 NOTE — ED PROVIDER NOTES
ED Provider Note  [facility]      History     Chief Complaint   Patient presents with     Post-op Problem     Leg Swelling     HPI  Susan Puckett is a 52 year old female with a history of Cueto cirrhosis, status post liver transplant on 11/2/2022, presents to the ED with complaint of bilateral leg swelling and pain.  She states that when she woke up after her surgery she had numbness in her right lateral leg and some intense burning sensation.  She states that that is continued, and within the last couple of days she started to have some mild discomfort in her bilateral legs diffusely.  She states that in the last day the pain has become much more severe in the bilateral legs, particularly with sitting and walking.  She states she has had some swelling in the legs, which waxes and wanes.  She states that she does not necessarily think that the intensity of the pain is related to the amount of edema.  No fevers.  No trauma.  No stuffy nose, sore throat, shortness of breath, vomiting, diarrhea.  She has been taking her meds.  She does have some ongoing postoperative pain, but she states it is nothing significant.    Past Medical History  Past Medical History:   Diagnosis Date     Anemia      Anxiety      Cold sore      Depressive disorder      Diabetes (H)     Type 2 DM/No Insulin      Encephalopathy      Esophageal varices without bleeding (H)     grade I     History of blood transfusion     10/2019     History of seizure     diabetic seizure     Insomnia      Liver cirrhosis secondary to CUETO (H) 05/28/2020     Migraine      Pleural effusion      Thrombocytopenia (H)      Thyroid disease      Past Surgical History:   Procedure Laterality Date     APPENDECTOMY      1989     BENCH LIVER N/A 11/2/2022    Procedure: Bench liver;  Surgeon: Delbert Morgan MD;  Location: [facility] OR     COLONOSCOPY      1/2020 at [facility]      ENT SURGERY  1998    tempanoplasty     GI SURGERY      EGD August 2020  at [facility]      GYN SURGERY      laparoscopic ablation     IR TRANSVEN INTRAHEPATIC PORTOSYST SHUNT  2021     MAMMOPLASTY REDUCTION BILATERAL       TN STAB PHELBECTOMY VARICOSE VEINS, LESS THAN 10 INCISIONS, ONE EXTREMITY       RNY gastric bypass  2006    retoocolic, retrogastric, [facility]     TONSILLECTOMY & ADENOIDECTOMY Bilateral 1978     TRANSPLANT LIVER RECIPIENT  DONOR N/A 2022    Procedure: TRANSPLANT, LIVER, RECIPIENT,  DONOR;  Surgeon: Delbert Morgan MD;  Location: [facility] OR     WISDOM TEETH EXTRACTION       amylase-lipase-protease (CREON 24) 59199-63156 units CPEP per EC capsule  amylase-lipase-protease (CREON 24) 28521-65434 units CPEP per EC capsule  aspirin (ASA) 81 MG chewable tablet  calcium carbonate-vitamin D (OSCAL W/D) 500-200 MG-UNIT tablet  Continuous Blood Gluc  (DEXCOM G6 ) LUNA  Continuous Blood Gluc Sensor (DEXCOM G6 SENSOR) MISC  Continuous Blood Gluc Transmit (DEXCOM G6 TRANSMITTER) MISC  cyanocobalamin (VITAMIN B-12) 1000 MCG tablet  dapsone (ACZONE) 25 MG tablet  escitalopram (LEXAPRO) 10 MG tablet  folic acid (FOLVITE) 1 MG tablet  gabapentin (NEURONTIN) 100 MG capsule  HYDROmorphone (DILAUDID) 2 MG tablet  insulin aspart (NOVOLOG VIAL) 100 UNITS/ML vial  insulin glargine (LANTUS PEN) 100 UNIT/ML pen  insulin pen needle (BD GRISEL U/F) 32G X 4 MM miscellaneous  levothyroxine (SYNTHROID/LEVOTHROID) 150 MCG tablet  magnesium oxide (MAG-OX) 400 MG tablet  melatonin 5 MG tablet  multivitamin CF FORMULA (DEKAS PLUS) capsule  mycophenolate (GENERIC EQUIVALENT) 250 MG capsule  nystatin (MYCOSTATIN) 085904 UNIT/ML suspension  ondansetron (ZOFRAN ODT) 8 MG ODT tab  pantoprazole (PROTONIX) 40 MG EC tablet  polyethylene glycol (MIRALAX) 17 GM/Dose powder  sennosides (SENOKOT) 8.6 MG tablet  simethicone (MYLICON) 80 MG chewable tablet  sitagliptin (JANUVIA) 100 MG tablet  tacrolimus (GENERIC EQUIVALENT) 1 MG capsule  topiramate (TOPAMAX) 50  "MG tablet  traZODone (DESYREL) 100 MG tablet  valGANciclovir (VALCYTE) 450 MG tablet      Allergies   Allergen Reactions     Methylprednisolone Hives     Per patient, reaction occurred in 1999 or earlier. Broke out in round, flat hives in neck and chest area. No shortness of breath or swelling. Unknown if reaction occurred immediately after administration.      Oxycodone      slurring     Droperidol Anxiety     Nsaids Other (See Comments)     GI bleed.     Prednisone Anxiety, Hives and Rash     Family History  Family History   Problem Relation Age of Onset     Anesthesia Reaction Nephew         PONV     Bleeding Disorder No family hx of      Clotting Disorder No family hx of      Social History   Social History     Tobacco Use     Smoking status: Never     Smokeless tobacco: Never   Substance Use Topics     Alcohol use: Not Currently     Comment: Last drink was in 2017     Drug use: Never      Past medical history, past surgical history, medications, allergies, family history, and social history were reviewed with the patient. No additional pertinent items.       Review of Systems  A complete review of systems was performed with pertinent positives and negatives noted in the HPI, and all other systems negative.    Physical Exam   BP: 111/76  Pulse: 72  Temp: 98.1  F (36.7  C)  Resp: 18  Height: 167.6 cm (5' 6\")  Weight: 89.4 kg (197 lb)  SpO2: 96 %  Physical Exam  Constitutional:       General: She is not in acute distress.     Appearance: She is not diaphoretic.   HENT:      Head: Atraumatic.   Eyes:      General: No scleral icterus.  Cardiovascular:      Heart sounds: Normal heart sounds.   Pulmonary:      Effort: No respiratory distress.      Breath sounds: Normal breath sounds.   Abdominal:      Palpations: Abdomen is soft.      Tenderness: There is no abdominal tenderness.      Comments: Incisions c/d/i   Musculoskeletal:         General: Tenderness (diffuse bilateral leg tenderness with palpation. Strength " testing limited by pain, but seems equal. Sensation nl aside from right lateral thigh, which has decreased light touch) present.      Right lower leg: Edema (3+ pitting edema B LE through thighs, tender) present.      Left lower leg: Edema (3+ pitting edema B LE through thighs, tender) present.      Comments: Strong distal pulses   Skin:     General: Skin is warm.      Findings: No rash.         ED Course      Procedures                     Results for orders placed or performed during the hospital encounter of 11/19/22   [place] Draw     Status: None    Narrative    The following orders were created for panel order [place] Draw.  Procedure                               Abnormality         Status                     ---------                               -----------         ------                     Extra Blue Top Tube[[id number]]                              Final result               Extra Red Top Tube[[id number]]                               Final result               Extra Green Top (Lithium...[[id number]]                      Final result               Extra Purple Top Tube[[id number]]                            Final result                 Please view results for these tests on the individual orders.   Phosphorus     Status: Normal   Result Value Ref Range    Phosphorus 4.3 2.5 - 4.5 mg/dL   Extra Blue Top Tube     Status: None   Result Value Ref Range    Hold Specimen JIC    Extra Red Top Tube     Status: None   Result Value Ref Range    Hold Specimen JIC    Extra Green Top (Lithium Heparin) Tube     Status: None   Result Value Ref Range    Hold Specimen JIC    Extra Purple Top Tube     Status: None   Result Value Ref Range    Hold Specimen JIC    CBC with platelets and differential     Status: Abnormal   Result Value Ref Range    WBC Count 3.1 (L) 4.0 - 11.0 10e3/uL    RBC Count 2.47 (L) 3.80 - 5.20 10e6/uL    Hemoglobin 7.6 (L) 11.7 - 15.7 g/dL    Hematocrit 24.2 (L) 35.0 - 47.0 %    MCV 98 78 - 100 fL     MCH 30.8 26.5 - 33.0 pg    MCHC 31.4 (L) 31.5 - 36.5 g/dL    RDW 15.6 (H) 10.0 - 15.0 %    Platelet Count 361 150 - 450 10e3/uL    % Neutrophils 66 %    % Lymphocytes 20 %    % Monocytes 7 %    % Eosinophils 4 %    % Basophils 2 %    % Immature Granulocytes 1 %    NRBCs per 100 WBC 0 <1 /100    Absolute Neutrophils 2.1 1.6 - 8.3 10e3/uL    Absolute Lymphocytes 0.6 (L) 0.8 - 5.3 10e3/uL    Absolute Monocytes 0.2 0.0 - 1.3 10e3/uL    Absolute Eosinophils 0.1 0.0 - 0.7 10e3/uL    Absolute Basophils 0.1 0.0 - 0.2 10e3/uL    Absolute Immature Granulocytes 0.0 <=0.4 10e3/uL    Absolute NRBCs 0.0 10e3/uL   CBC with platelets differential     Status: Abnormal    Narrative    The following orders were created for panel order CBC with platelets differential.  Procedure                               Abnormality         Status                     ---------                               -----------         ------                     CBC with platelets and d...[[id number]]  Abnormal            Final result                 Please view results for these tests on the individual orders.     Medications   HYDROmorphone (PF) (DILAUDID) injection 0.5 mg (0.5 mg Intravenous Given 11/19/22 0557)        Assessments & Plan (with Medical Decision Making)   She does have notable bilateral lower extremity pitting edema which is moderately tense.  This goes all the way up through her thighs.  She does have good peripheral pulses.  No sign of cellulitis.  I suspect that this edema is causing her discomfort.  There is been no trauma.  I did speak with the transplant fellow.  We will get bilateral lower extremity Dopplers to evaluate for DVT.  They will come and evaluate her.  She has no notable abdominal discomfort, abdominal exam is pretty benign.  Her incisions look good without any obvious sign of infection.  Unfortunately there was significant delay in obtaining labs, labs are still pending.  She will be signed out at shift change pending  bilateral lower extremity Dopplers as well as transplant surgery evaluation.    Dictation Disclaimer: Some of this Note has been completed with voice-recognition dictation software. Although errors are generally corrected real-time, there is the potential for a rare error to be present in the completed chart.      I have reviewed the nursing notes. I have reviewed the findings, diagnosis, plan and need for follow up with the patient.    New Prescriptions    No medications on file       Final diagnoses:   Bilateral leg pain   Bilateral lower extremity edema   Status post liver transplantation (H)       --  Shelby Coombs  [facility] EMERGENCY DEPARTMENT  11/19/2022     Shelby Coombs MD  11/19/22 0754

## 2022-11-20 LAB
ALBUMIN SERPL BCG-MCNC: 2.7 G/DL (ref 3.5–5.2)
ALP SERPL-CCNC: 123 U/L (ref 35–104)
ALT SERPL W P-5'-P-CCNC: 10 U/L (ref 10–35)
ANION GAP SERPL CALCULATED.3IONS-SCNC: 8 MMOL/L (ref 7–15)
AST SERPL W P-5'-P-CCNC: 15 U/L (ref 10–35)
BILIRUB DIRECT SERPL-MCNC: <0.2 MG/DL (ref 0–0.3)
BILIRUB SERPL-MCNC: 0.4 MG/DL
BUN SERPL-MCNC: 16.5 MG/DL (ref 6–20)
CALCIUM SERPL-MCNC: 8.4 MG/DL (ref 8.6–10)
CHLORIDE SERPL-SCNC: 102 MMOL/L (ref 98–107)
CREAT SERPL-MCNC: 1.22 MG/DL (ref 0.51–0.95)
DEPRECATED HCO3 PLAS-SCNC: 22 MMOL/L (ref 22–29)
ERYTHROCYTE [DISTWIDTH] IN BLOOD BY AUTOMATED COUNT: 15.4 % (ref 10–15)
GFR SERPL CREATININE-BSD FRML MDRD: 54 ML/MIN/1.73M2
GLUCOSE BLDC GLUCOMTR-MCNC: 161 MG/DL (ref 70–99)
GLUCOSE BLDC GLUCOMTR-MCNC: 171 MG/DL (ref 70–99)
GLUCOSE BLDC GLUCOMTR-MCNC: 189 MG/DL (ref 70–99)
GLUCOSE BLDC GLUCOMTR-MCNC: 198 MG/DL (ref 70–99)
GLUCOSE BLDC GLUCOMTR-MCNC: 292 MG/DL (ref 70–99)
GLUCOSE SERPL-MCNC: 176 MG/DL (ref 70–99)
HCT VFR BLD AUTO: 26 % (ref 35–47)
HGB BLD-MCNC: 8.2 G/DL (ref 11.7–15.7)
MAGNESIUM SERPL-MCNC: 1.2 MG/DL (ref 1.7–2.3)
MCH RBC QN AUTO: 30.1 PG (ref 26.5–33)
MCHC RBC AUTO-ENTMCNC: 31.5 G/DL (ref 31.5–36.5)
MCV RBC AUTO: 96 FL (ref 78–100)
PHOSPHATE SERPL-MCNC: 4.2 MG/DL (ref 2.5–4.5)
PLATELET # BLD AUTO: 336 10E3/UL (ref 150–450)
POTASSIUM SERPL-SCNC: 4.9 MMOL/L (ref 3.4–5.3)
PROT SERPL-MCNC: 5.1 G/DL (ref 6.4–8.3)
RBC # BLD AUTO: 2.72 10E6/UL (ref 3.8–5.2)
SODIUM SERPL-SCNC: 132 MMOL/L (ref 136–145)
TACROLIMUS BLD-MCNC: 5.3 UG/L (ref 5–15)
TME LAST DOSE: NORMAL H
TME LAST DOSE: NORMAL H
WBC # BLD AUTO: 3.3 10E3/UL (ref 4–11)

## 2022-11-20 PROCEDURE — 258N000003 HC RX IP 258 OP 636: Performed by: NURSE PRACTITIONER

## 2022-11-20 PROCEDURE — 250N000013 HC RX MED GY IP 250 OP 250 PS 637

## 2022-11-20 PROCEDURE — 82248 BILIRUBIN DIRECT: CPT | Performed by: NURSE PRACTITIONER

## 2022-11-20 PROCEDURE — 120N000011 HC R&B TRANSPLANT UMMC

## 2022-11-20 PROCEDURE — 84100 ASSAY OF PHOSPHORUS: CPT | Performed by: NURSE PRACTITIONER

## 2022-11-20 PROCEDURE — 80197 ASSAY OF TACROLIMUS: CPT | Performed by: NURSE PRACTITIONER

## 2022-11-20 PROCEDURE — 250N000011 HC RX IP 250 OP 636: Performed by: NURSE PRACTITIONER

## 2022-11-20 PROCEDURE — 250N000012 HC RX MED GY IP 250 OP 636 PS 637: Performed by: SURGERY

## 2022-11-20 PROCEDURE — 250N000012 HC RX MED GY IP 250 OP 636 PS 637: Performed by: NURSE PRACTITIONER

## 2022-11-20 PROCEDURE — 36415 COLL VENOUS BLD VENIPUNCTURE: CPT | Performed by: NURSE PRACTITIONER

## 2022-11-20 PROCEDURE — 83735 ASSAY OF MAGNESIUM: CPT | Performed by: NURSE PRACTITIONER

## 2022-11-20 PROCEDURE — 250N000011 HC RX IP 250 OP 636: Performed by: EMERGENCY MEDICINE

## 2022-11-20 PROCEDURE — 85027 COMPLETE CBC AUTOMATED: CPT | Performed by: NURSE PRACTITIONER

## 2022-11-20 PROCEDURE — 250N000013 HC RX MED GY IP 250 OP 250 PS 637: Performed by: SURGERY

## 2022-11-20 PROCEDURE — 250N000013 HC RX MED GY IP 250 OP 250 PS 637: Performed by: NURSE PRACTITIONER

## 2022-11-20 PROCEDURE — 999N000127 HC STATISTIC PERIPHERAL IV START W US GUIDANCE

## 2022-11-20 RX ORDER — MAGNESIUM SULFATE HEPTAHYDRATE 40 MG/ML
4 INJECTION, SOLUTION INTRAVENOUS ONCE
Status: COMPLETED | OUTPATIENT
Start: 2022-11-20 | End: 2022-11-20

## 2022-11-20 RX ADMIN — GABAPENTIN 200 MG: 100 CAPSULE ORAL at 20:53

## 2022-11-20 RX ADMIN — INSULIN ASPART 2 UNITS: 100 INJECTION, SOLUTION INTRAVENOUS; SUBCUTANEOUS at 18:42

## 2022-11-20 RX ADMIN — GABAPENTIN 200 MG: 100 CAPSULE ORAL at 07:58

## 2022-11-20 RX ADMIN — INSULIN ASPART 7 UNITS: 100 INJECTION, SOLUTION INTRAVENOUS; SUBCUTANEOUS at 14:37

## 2022-11-20 RX ADMIN — SENNOSIDES 2 TABLET: 8.6 TABLET, FILM COATED ORAL at 20:53

## 2022-11-20 RX ADMIN — ACETAMINOPHEN 975 MG: 325 TABLET, FILM COATED ORAL at 20:53

## 2022-11-20 RX ADMIN — INSULIN GLARGINE 12 UNITS: 100 INJECTION, SOLUTION SUBCUTANEOUS at 09:36

## 2022-11-20 RX ADMIN — LEVOTHYROXINE SODIUM 150 MCG: 0.07 TABLET ORAL at 07:58

## 2022-11-20 RX ADMIN — CEFEPIME HYDROCHLORIDE 1 G: 1 INJECTION, POWDER, FOR SOLUTION INTRAMUSCULAR; INTRAVENOUS at 12:18

## 2022-11-20 RX ADMIN — PANCRELIPASE 2 CAPSULE: 24000; 76000; 120000 CAPSULE, DELAYED RELEASE PELLETS ORAL at 09:35

## 2022-11-20 RX ADMIN — PANCRELIPASE 2 CAPSULE: 24000; 76000; 120000 CAPSULE, DELAYED RELEASE PELLETS ORAL at 14:26

## 2022-11-20 RX ADMIN — DAPSONE 50 MG: 25 TABLET ORAL at 07:59

## 2022-11-20 RX ADMIN — VALGANCICLOVIR 450 MG: 450 TABLET, FILM COATED ORAL at 07:59

## 2022-11-20 RX ADMIN — HYDROMORPHONE HYDROCHLORIDE 2 MG: 2 TABLET ORAL at 07:58

## 2022-11-20 RX ADMIN — ACETAMINOPHEN 975 MG: 325 TABLET, FILM COATED ORAL at 12:17

## 2022-11-20 RX ADMIN — TRAZODONE HYDROCHLORIDE 50 MG: 50 TABLET ORAL at 00:06

## 2022-11-20 RX ADMIN — GABAPENTIN 200 MG: 100 CAPSULE ORAL at 14:35

## 2022-11-20 RX ADMIN — MAGNESIUM SULFATE HEPTAHYDRATE 4 G: 40 INJECTION, SOLUTION INTRAVENOUS at 09:33

## 2022-11-20 RX ADMIN — TACROLIMUS 6 MG: 5 CAPSULE ORAL at 08:00

## 2022-11-20 RX ADMIN — CEFEPIME HYDROCHLORIDE 1 G: 1 INJECTION, POWDER, FOR SOLUTION INTRAMUSCULAR; INTRAVENOUS at 23:50

## 2022-11-20 RX ADMIN — PANCRELIPASE 2 CAPSULE: 24000; 76000; 120000 CAPSULE, DELAYED RELEASE PELLETS ORAL at 18:12

## 2022-11-20 RX ADMIN — MAGNESIUM OXIDE TAB 400 MG (241.3 MG ELEMENTAL MG) 400 MG: 400 (241.3 MG) TAB at 20:53

## 2022-11-20 RX ADMIN — MYCOPHENOLATE MOFETIL 750 MG: 250 CAPSULE ORAL at 07:59

## 2022-11-20 RX ADMIN — FUROSEMIDE 10 MG/HR: 10 INJECTION, SOLUTION INTRAMUSCULAR; INTRAVENOUS at 00:09

## 2022-11-20 RX ADMIN — PANTOPRAZOLE SODIUM 40 MG: 40 TABLET, DELAYED RELEASE ORAL at 07:58

## 2022-11-20 RX ADMIN — INSULIN ASPART 1 UNITS: 100 INJECTION, SOLUTION INTRAVENOUS; SUBCUTANEOUS at 09:41

## 2022-11-20 RX ADMIN — Medication 1 CAPSULE: at 07:59

## 2022-11-20 RX ADMIN — TOPIRAMATE 50 MG: 50 TABLET, FILM COATED ORAL at 07:59

## 2022-11-20 RX ADMIN — FUROSEMIDE 10 MG/HR: 10 INJECTION, SOLUTION INTRAMUSCULAR; INTRAVENOUS at 11:21

## 2022-11-20 RX ADMIN — ASPIRIN 81 MG CHEWABLE TABLET 81 MG: 81 TABLET CHEWABLE at 07:57

## 2022-11-20 RX ADMIN — FOLIC ACID 1 MG: 1 TABLET ORAL at 07:58

## 2022-11-20 RX ADMIN — CEFEPIME HYDROCHLORIDE 1 G: 1 INJECTION, POWDER, FOR SOLUTION INTRAMUSCULAR; INTRAVENOUS at 00:07

## 2022-11-20 RX ADMIN — MAGNESIUM OXIDE TAB 400 MG (241.3 MG ELEMENTAL MG) 400 MG: 400 (241.3 MG) TAB at 07:58

## 2022-11-20 RX ADMIN — ESCITALOPRAM OXALATE 10 MG: 10 TABLET ORAL at 07:58

## 2022-11-20 RX ADMIN — MYCOPHENOLATE MOFETIL 750 MG: 250 CAPSULE ORAL at 18:11

## 2022-11-20 RX ADMIN — TACROLIMUS 6 MG: 5 CAPSULE ORAL at 18:11

## 2022-11-20 ASSESSMENT — ACTIVITIES OF DAILY LIVING (ADL)
ADLS_ACUITY_SCORE: 38
ADLS_ACUITY_SCORE: 38
ADLS_ACUITY_SCORE: 36
ADLS_ACUITY_SCORE: 38
ADLS_ACUITY_SCORE: 38
DEPENDENT_IADLS:: CLEANING;COOKING;LAUNDRY;SHOPPING;MEAL PREPARATION;MEDICATION MANAGEMENT;TRANSPORTATION
ADLS_ACUITY_SCORE: 38

## 2022-11-20 NOTE — PLAN OF CARE
"  BP 92/50 (BP Location: Right arm)   Pulse 72   Temp 98.7  F (37.1  C) (Oral)   Resp 16   Ht 1.676 m (5' 5.98\")   Wt 87.2 kg (192 lb 4.8 oz)   SpO2 93%   BMI 31.05 kg/m      [id number]  Softer BPs, 90/50s but pt sleeping deeply thru shift; OVSS on RA, no s/s of distress. A/Ox4, calm and cooperative with cares; intermittently anxious but responsive to encouragement/reinforcement. Earlier in evening, pt c/o intense \"burning\" pain in legs from BLE edema; PRN oral dilaudid given and pt reporting adequate pain relief this shift. PIV patent with lasix gtt infusing; also started on IV abx for UTI. Voiding large amounts via BSC; no BMs this evening. Up SBA/x1 with walker; steady gait. Pt sleeping thru most of shift after scheduled trazadone given. Call light and belongings within reach. Will continue to monitor and continue POC/notify team as needed.  "

## 2022-11-20 NOTE — PROGRESS NOTES
A&Ox4. SBA w/ walker. VSS BP soft at times. . Reg diet. Has BLE edema +2. UTI positive on abx. Has furosemide infusing at 10mL/hr.  Awaiting a bed.

## 2022-11-20 NOTE — PROGRESS NOTES
"SPIRITUAL HEALTH SERVICES Progress Note  [facility] ([place]) 7A    Met with patient Susan per admission request.      Illness Narrative - Susan recently underwent a liver transplant from a directed donation from a friend who lost her son to a cardiac arrest. She has non alcohol related liver cirrhosis which is why she needed the transplant. She received the liver in the first part of November, and was recently discharged from TCU. She is now readmitted with burning pain and swelling in her legs which is causing her a lot of discomfort.       Distress - Susan expressed that she is feeling overwhelmed with so many emotions, naming gratitude for the donation of the liver, but also figuring out how to honor the donor, Clayton, as she lives her life because of him. She also named significant distress around the pain in her legs and it affecting her sleep and comfort.      Coping - Susan finds great meaning in her larisa and in her family. She has a , Roly, children: Jonah, Caleb, Sindy and Leandra. Susan did express that a lot of her children are struggling with mental health problems and she wants to be there for them, but also needs to \"take care of myself\" in the midst of this recent transplant and now further medical issues.       Meaning-Making - Susan states she was baptized Jain, but now attends [facility] [employer] [facility] [employer] and identified prayer as most meaningful for her. She states she has had numerous people and Christianity prayer chains praying for her throughout this process.       Plan - Susan appreciates [facility] and appreciates prayer. She would appreciate a follow-up from the unit . Triaged this request for the unit . [facility] remains available for follow up and per patient/family request.     Kelin Robles MDiv.  Chaplain Resident  Pager [phone]    * [facility] remains available 24/7 for emergent requests/referrals, either by having the switchboard page the on-call  or by entering an ASAP/STAT consult in Epic " (this will also page the on-call ). Routine Epic consults receive an initial response within 24 hours.*

## 2022-11-20 NOTE — CONSULTS
Care Management Initial Consult    General Information  Assessment completed with: Patient, Spouse or significant other,  (Josefina)  Type of CM/SW Visit: Initial Assessment    Primary Care Provider verified and updated as needed: Yes   Readmission within the last 30 days: previous discharge plan unsuccessful      Reason for Consult: discharge planning  Advance Care Planning:            Communication Assessment  Patient's communication style: spoken language (English or Bilingual)    Hearing Difficulty or Deaf: no   Wear Glasses or Blind: yes    Cognitive  Cognitive/Neuro/Behavioral: WDL  Level of Consciousness: alert  Arousal Level: opens eyes spontaneously  Orientation: oriented x 4     Best Language: 0 - No aphasia  Speech: clear, spontaneous, logical    Living Environment:   People in home: spouse   (Roly)  Current living Arrangements: house      Able to return to prior arrangements: yes       Family/Social Support:  Care provided by: self  Provides care for: no one  Marital Status:   , Children, Sibling(s)   (Roly)       Description of Support System: Supportive    Support Assessment: Adequate family and caregiver support    Current Resources:   Patient receiving home care services: No     Community Resources: None  Equipment currently used at home: cane, straight, wheelchair, manual  Supplies currently used at home:      Employment/Financial:  Employment Status: disabled        Financial Concerns: No concerns identified   Referral to Financial Worker: No       Lifestyle & Psychosocial Needs:  Social Determinants of Health     Tobacco Use: Low Risk      Smoking Tobacco Use: Never     Smokeless Tobacco Use: Never     Passive Exposure: Not on file   Alcohol Use: Not on file   Financial Resource Strain: Not on file   Food Insecurity: Not on file   Transportation Needs: Not on file   Physical Activity: Not on file   Stress: Not on file   Social Connections: Not on file   Intimate Partner  Violence: Not on file   Depression: At risk     PHQ-2 Score: 4   Housing Stability: Not on file       Functional Status:  Prior to admission patient needed assistance:   Dependent ADLs:: Bathing, Dressing, Ambulation-walker  Dependent IADLs:: Cleaning, Cooking, Laundry, Shopping, Meal Preparation, Medication Management, Transportation       Mental Health Status:  Patient presents with anxiety. She previously had individual therapy however has been unable to attend. She is open to a new referral to Dr. Gaines.     Chemical Dependency Status:   No previous chemical health concerns.           Values/Beliefs:  Spiritual, Cultural Beliefs, Mosque Practices, Values that affect care:    Description of Beliefs that Will Affect Care: Su            Additional Information:  Susan was admitted to [facility] on 11/19/2022. She received a liver transplant on 11/2/2022. Susan reports that she was home for about a day and then readmitted to the ED. She has made progress since discharge from the hospital and TCU. She feels ready to go home. Spouse would like edema gone. I spoke with pt and spouse regarding realistic expectations. We discussed mental health. She is open to a referral to Dr. Sancho Gaines.   They voiced understanding. Susan did not discharge with any home care agency. She contacted her insurance and they provided her the following agencies that are in-network.     Pending home care referrals  - [facility] home care  - [facility]   - [facility]     [facility]  - [facility]  is not in Epic. I reviewed services on their website. It does not appear that they provide nursing home care.     Juanita Bhagat, [facility]  11/20/2022       Social Work and Care Management Department       SEARCHABLE in [facility] - search SOCIAL WORK       [place] (2496 [phone] 4451) Saturday and Sunday     Units: 4A, 4C, & 4E Pager: [phone]     Units: 5A & 5B Pager: [phone]     Units: 6A & 6B   Pager:  [phone]     Units: 6C & 6D Pager: [phone]     Units: 7A &7B  Pager: [phone]     Units: 7C, 7D, & 5C Pager: [phone]     Unit: [place] [facility] Pager: [phone]      [facility] (5998-4603) Saturday and Sunday      Units: 5 Ortho, 5 Med/Surg & WB ED  Pager:[phone]     Units: 6 Med/Surg, 8A, & 10A ICU  Pager: [phone]        After hours (1630 - 0000) Saturday & Sunday; (9608-5367) Mon-Fri; (5921-4293) [facility] Recognized Holidays     Units: ALL  Pager: [phone]

## 2022-11-20 NOTE — PROVIDER NOTIFICATION
[id number] Susan Puckett has stated she's been previously sent home with 2mg of DILAUDID and would like the current 1mg order changed to a 2mg order. Patient refused to take the 1mg with a reported pain level of 10. -RN Sarah REESE

## 2022-11-20 NOTE — PLAN OF CARE
"BP 92/50 (BP Location: Right arm)   Pulse 72   Temp 98.7  F (37.1  C) (Oral)   Resp 16   Ht 1.676 m (5' 5.98\")   Wt 87.2 kg (192 lb 4.8 oz)   SpO2 93%   BMI 31.05 kg/m      Shift: 0267-0543  Isolation Status: NA  Diet: Regular diet.  LDA: PIV w/ Lasix drip  Infusion(s): Lasix @ 10 mg/hr  VS: AVSS on RA.  Pain/Nausea/PRN: Discomfort reported, but no pain. No reported nausea.  Lab: Hg of 7.6.  B @ 0200.  Neuro: A&O x4. Calls appropriately.  Behaviors: Calm, cooperative, and pleasant  Respiratory: Regular depth and pattern; unlabored; expansion symmetrical; breath sounds clear and equal bilaterally; no cough  Cardiac: Regular rhythm, S1, S2; no reported chest pain  GI/: LBM: . Voiding adequately and without difficulty.  Skin: Stapled abdominal incision.   Mobility: Up w/ SBA.  Plan: -CK WNL  -CXR with atelectasis  -TTE with EF 55-60%, Grade 1/early diastolic dysfunction, left filling pressures normal.  -BLE US negative for DVT  -Gabapentin increased to TID  -Vitamin B12 injectionx1, level WNL  -Continue Lasix gtt 10 mg/hr  -Lymphedema consult      "

## 2022-11-20 NOTE — PROGRESS NOTES
Transplant Surgery  Inpatient Daily Progress Note  11/20/2022    Assessment & Plan: Susan Puckett is an 52 year old female with PMH significant obesity s/p Faustino-en-Y, DM2, HTN, CUETO cirrhosis c/b HE, EV, Hepatic hydrothorax. She is s/p OLTx 11/2/22 with Dr. Delbert Morgan. Discharged home from TCU 11/17. Presented to ED 11/19 with c/o intolerable burning pain and swelling in bilateral lower extremities.     S/p DDLT 11/2/22: POD#18. LFTs WNL. US patent with improved RIs. Continue ASA 81 mg daily.     Immunosuppression management:  -Tacrolimus 6 mg BID. Goal level 8-12. Level today 5.3, but was previously therapeutic and missed dose yesterday. Recheck tomorrow.  - mg BID     Neurology/Psych:  Acute BLE pain: PRN Tylenol, dilaudid.   Hx Hypoglycemic seizures: PTA Topamax.   Hx Depression: PTA Lexapro, Trazadone.   Hx Migraine: PTA Rizatriptan PRN.      Hematology:   Leukopenia: Likely r/t medications. Monitor.  Acute blood loss/Anemia of chronic disease: Hgb stable ~8. Monitor.     Cardiorespiratory: No acute issues.       GI/Nutrition:   Hx Gastric Bypass; Moderate malnutrition in the context of acute on chronic illness: Regular diet, SAMI multivitamin.   Hx Pancreatic insufficiency: PTA Creon.     Endocrine:   DM2: Restart Lantus 12 units daily, sliding scale Novolog, carb coverage.   Hx Hypothyroidism: PTA Synthroid.     Fluid/Electrolytes/Neph:   Hypomagnesemia: Mg 1.2. Continue Mag-ox 400 mg BID. Give additional 4 grams IV today.   Hypervolemia associated with renal insufficiency, hypoalbuminemia, BLE edema: Burning, bilateral LE pain and edema.   -CK WNL  -CXR with atelectasis  -TTE with EF 55-60%, Grade 1/early diastolic dysfunction, left filling pressures normal.  -BLE US negative for DVT  -Gabapentin increased to TID  -Vitamin B12 injectionx1, level WNL  -Continue Lasix gtt 10 mg/hr  -Lymphedema consult     : No acute issues.      Infectious disease:   E coli UTI: UCx +E coli. Continue cefepime.       Prophylaxis: DVT (mechanical), GI (PPI), fall, viral (Valcyte), pneumocystis ([place])     Disposition: 7A, plan for discharge home likely tomorrow if BLE edema continues to improve.      Medical Decision Making: High  Subsequent visit 12760 (high level decision making)    ANYA/Fellow/Resident Provider: Bhavana Romo NP [id number]    Faculty: Foreign Benitez M.D.  __________________________________________________________________  Transplant History: Admitted 11/19/2022 for BLE edema.   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    11/2/2022 (Liver), Postoperative day: 18     Interval History: History is obtained from the patient  Overnight events: No acute events overnight. Still has BLE pain but swelling has improved.     ROS:   A 10-point review of systems was negative except as noted above.    Curent Meds:    acetaminophen  975 mg Oral Q8H     amylase-lipase-protease  2 capsule Oral TID w/meals     aspirin  81 mg Oral Daily     ceFEPIme  1 g Intravenous Q12H     [START ON 11/26/2022] cyanocobalamin  1,000 mcg Oral Q7 Days     dapsone  50 mg Oral Daily     escitalopram  10 mg Oral Daily     folic acid  1 mg Oral QAM     gabapentin  200 mg Oral TID     insulin aspart  1-10 Units Subcutaneous TID AC     insulin aspart  1-7 Units Subcutaneous At Bedtime     insulin glargine  12 Units Subcutaneous QAM AC     levothyroxine  150 mcg Oral QAM AC     magnesium oxide  400 mg Oral BID     multivitamin CF FORMULA  1 capsule Oral Daily     mycophenolate  750 mg Oral BID IS     pantoprazole  40 mg Oral QAM AC     polyethylene glycol  17 g Oral Daily     sennosides  2 tablet Oral or Feeding Tube BID     sodium chloride (PF)  3 mL Intracatheter Q8H     tacrolimus  6 mg Oral BID IS     topiramate  50 mg Oral Daily     traZODone  50 mg Oral At Bedtime     valGANciclovir  450 mg Oral Daily       Physical Exam:     Admit Weight: 89.4 kg (197 lb)    Current Vitals:   BP 93/63 (BP Location: Left  "arm)   Pulse 73   Temp 98.2  F (36.8  C) (Oral)   Resp 16   Ht 1.676 m (5' 5.98\")   Wt 87.2 kg (192 lb 4.8 oz)   SpO2 98%   BMI 31.05 kg/m           Vital sign ranges:    Temp:  [97.9  F (36.6  C)-98.9  F (37.2  C)] 98.2  F (36.8  C)  Pulse:  [69-77] 73  Resp:  [16] 16  BP: ()/(50-75) 93/63  SpO2:  [93 %-98 %] 98 %  Patient Vitals for the past 24 hrs:   BP Temp Temp src Pulse Resp SpO2 Height Weight   11/20/22 1112 93/63 98.2  F (36.8  C) Oral 73 16 98 % -- --   11/20/22 0137 92/50 98.7  F (37.1  C) Oral 72 16 93 % -- --   11/19/22 2216 108/61 98.9  F (37.2  C) Oral 77 16 94 % -- --   11/19/22 2039 -- -- -- -- -- -- 1.676 m (5' 5.98\") 87.2 kg (192 lb 4.8 oz)   11/19/22 2000 112/75 98.7  F (37.1  C) Oral 69 16 97 % -- --   11/19/22 1634 100/64 97.9  F (36.6  C) Oral 76 16 95 % -- --     General Appearance: in no apparent distress.   Skin: normal, warm, dry  Heart: regular rate and rhythm  Lungs: unlabored on RA  Abdomen: obese. Incision closed with staples and open to air, no drainage/ecchymosis/erythema noted. TERESITA site stitched.  : no Rossi  Extremities: edema: present bilaterally. 2+.   Neurologic: awake, alert and oriented. Tremor absent. Asterixis: absent.    Frailty Scores     Frailty Scores 1/6/2022 10/18/2020    Final Score Frail Not Frail    Final Score Number 3 2          Data:   CMP  Recent Labs   Lab 11/20/22  0940 11/20/22  0606 11/19/22  1513 11/19/22  0522   NA  --  132*  --  136   POTASSIUM  --  4.9  --  4.7   CHLORIDE  --  102  --  106   CO2  --  22  --  20*   * 176*   < > 181*   BUN  --  16.5  --  13.5   CR  --  1.22*  --  1.18*   GFRESTIMATED  --  54*  --  56*   MAURI  --  8.4*  --  8.4*   MAG  --  1.2*  --  1.7   PHOS  --  4.2  --  4.3   ALBUMIN  --  2.7*  --  2.8*   BILITOTAL  --  0.4  --  0.4   ALKPHOS  --  123*  --  118*   AST  --  15  --  15   ALT  --  10  --  16    < > = values in this interval not displayed.     CBC  Recent Labs   Lab 11/20/22  0606 11/19/22  0522   HGB " 8.2* 7.6*   WBC 3.3* 3.1*    361     COAGSNo lab results found in last 7 days.    Invalid input(s): XA   Urinalysis  Recent Labs   Lab Test 11/19/22  0937 11/05/22  0928   COLOR Yellow Light Yellow   APPEARANCE Slightly Cloudy* Clear   URINEGLC Negative Negative   URINEBILI Negative Negative   URINEKETONE Negative 10*   SG 1.017 1.026   UBLD Negative Negative   URINEPH 5.5 5.5   PROTEIN 20* 20*   NITRITE Positive* Negative   LEUKEST Large* Negative   RBCU 3* 1   WBCU >182* 3     Virology:  Hepatitis C Antibody   Date Value Ref Range Status   11/01/2022 Nonreactive Nonreactive Final

## 2022-11-20 NOTE — PROGRESS NOTES
Admitted/transferred from: ED    Time of arrival on unit:     2 RN full  skin assessment completed by Leticia HANSEN and Sarah CR    Skin assessment findin) Clamshell incision; stapled, TRACEY  2) Scabs over old RJP site on RLQ; 1 suture still in place (sticky note left for team to address)  3) BLE edema, +2 (knees, calves, ankles, feet)  4) RPIV--lasix gtt infusing    Interventions/actions:   --Assess skin/LDAs qshift/PRN  --Pressure injury prevention education reinforced; pt verbalized understanding    Will continue to monitor.

## 2022-11-20 NOTE — PROGRESS NOTES
"/75 (BP Location: Right arm)   Pulse 69   Temp 98.7  F (37.1  C) (Oral)   Resp 16   Ht 1.676 m (5' 5.98\")   Wt 87.2 kg (192 lb 4.8 oz)   SpO2 97%   BMI 31.05 kg/m      Shift: 2000-2330  Patient came up from the E.D   VS: Vitally stable.   Neuro: Aox4  Behaviors: Cooperative but a bit anxious.   BG: ACHS; 118  Labs: N/A  Respiratory: WDL. On room air.   Cardiac: WDL  Pain/Nausea: Pain of 10, was given Tylenol and Dilaudid 2mg.   Diet: Regular   IV Access: Right lower PIV with LASIX running at 10mg/hr.   GI/: Reports having loose BMs in the ED and refused scheduled Senokot. Void of 300mL.   Skin: Edema and tightness in the extremities.   Mobility: Assist of 1 with walker.   Plan: Continue with plan of care.   "

## 2022-11-21 VITALS
HEIGHT: 66 IN | RESPIRATION RATE: 16 BRPM | BODY MASS INDEX: 29.46 KG/M2 | HEART RATE: 72 BPM | OXYGEN SATURATION: 98 % | SYSTOLIC BLOOD PRESSURE: 98 MMHG | WEIGHT: 183.3 LBS | TEMPERATURE: 97.9 F | DIASTOLIC BLOOD PRESSURE: 68 MMHG

## 2022-11-21 PROBLEM — K75.81 NASH (NONALCOHOLIC STEATOHEPATITIS): Status: RESOLVED | Noted: 2020-10-25 | Resolved: 2022-11-21

## 2022-11-21 PROBLEM — K74.60 NON-ALCOHOLIC CIRRHOSIS (H): Status: RESOLVED | Noted: 2020-07-09 | Resolved: 2022-11-21

## 2022-11-21 PROBLEM — R41.82 ALTERED MENTAL STATUS, UNSPECIFIED ALTERED MENTAL STATUS TYPE: Status: RESOLVED | Noted: 2021-12-26 | Resolved: 2022-11-21

## 2022-11-21 PROBLEM — D68.9 COAGULOPATHY (H): Status: RESOLVED | Noted: 2022-11-08 | Resolved: 2022-11-21

## 2022-11-21 PROBLEM — R79.89 LACTATE BLOOD INCREASE: Status: RESOLVED | Noted: 2020-09-09 | Resolved: 2022-11-21

## 2022-11-21 PROBLEM — E86.0 MODERATE DEHYDRATION: Status: RESOLVED | Noted: 2020-09-09 | Resolved: 2022-11-21

## 2022-11-21 PROBLEM — J94.8 HYDROTHORAX: Status: RESOLVED | Noted: 2020-07-29 | Resolved: 2022-11-21

## 2022-11-21 PROBLEM — E87.6 HYPOKALEMIA: Status: RESOLVED | Noted: 2022-02-15 | Resolved: 2022-11-21

## 2022-11-21 PROBLEM — K74.60 LIVER CIRRHOSIS SECONDARY TO NASH (H): Status: RESOLVED | Noted: 2020-05-28 | Resolved: 2022-11-21

## 2022-11-21 PROBLEM — K75.81 LIVER CIRRHOSIS SECONDARY TO NASH (H): Status: RESOLVED | Noted: 2020-05-28 | Resolved: 2022-11-21

## 2022-11-21 PROBLEM — R09.02 HYPOXIA: Status: RESOLVED | Noted: 2022-11-08 | Resolved: 2022-11-21

## 2022-11-21 PROBLEM — R41.0 CONFUSION: Status: RESOLVED | Noted: 2022-07-09 | Resolved: 2022-11-21

## 2022-11-21 PROBLEM — K74.60 DECOMPENSATED HEPATIC CIRRHOSIS (H): Status: RESOLVED | Noted: 2021-12-06 | Resolved: 2022-11-21

## 2022-11-21 PROBLEM — N39.0 URINARY TRACT INFECTION: Status: ACTIVE | Noted: 2022-11-21

## 2022-11-21 PROBLEM — K74.60 CIRRHOSIS OF LIVER WITHOUT ASCITES, UNSPECIFIED HEPATIC CIRRHOSIS TYPE (H): Status: RESOLVED | Noted: 2021-11-06 | Resolved: 2022-11-21

## 2022-11-21 PROBLEM — K72.90 DECOMPENSATED HEPATIC CIRRHOSIS (H): Status: RESOLVED | Noted: 2021-12-06 | Resolved: 2022-11-21

## 2022-11-21 LAB
ALBUMIN SERPL BCG-MCNC: 3 G/DL (ref 3.5–5.2)
ALP SERPL-CCNC: 130 U/L (ref 35–104)
ALT SERPL W P-5'-P-CCNC: 10 U/L (ref 10–35)
ANION GAP SERPL CALCULATED.3IONS-SCNC: 12 MMOL/L (ref 7–15)
AST SERPL W P-5'-P-CCNC: 16 U/L (ref 10–35)
BACTERIA UR CULT: ABNORMAL
BILIRUB DIRECT SERPL-MCNC: <0.2 MG/DL (ref 0–0.3)
BILIRUB SERPL-MCNC: 0.3 MG/DL
BUN SERPL-MCNC: 19.2 MG/DL (ref 6–20)
CALCIUM SERPL-MCNC: 8.3 MG/DL (ref 8.6–10)
CHLORIDE SERPL-SCNC: 101 MMOL/L (ref 98–107)
CREAT SERPL-MCNC: 1.33 MG/DL (ref 0.51–0.95)
DEPRECATED HCO3 PLAS-SCNC: 22 MMOL/L (ref 22–29)
GFR SERPL CREATININE-BSD FRML MDRD: 49 ML/MIN/1.73M2
GLUCOSE BLDC GLUCOMTR-MCNC: 104 MG/DL (ref 70–99)
GLUCOSE BLDC GLUCOMTR-MCNC: 167 MG/DL (ref 70–99)
GLUCOSE BLDC GLUCOMTR-MCNC: 169 MG/DL (ref 70–99)
GLUCOSE SERPL-MCNC: 208 MG/DL (ref 70–99)
MAGNESIUM SERPL-MCNC: 1.9 MG/DL (ref 1.7–2.3)
PHOSPHATE SERPL-MCNC: 5 MG/DL (ref 2.5–4.5)
POTASSIUM SERPL-SCNC: 4.8 MMOL/L (ref 3.4–5.3)
PROT SERPL-MCNC: 5.6 G/DL (ref 6.4–8.3)
SODIUM SERPL-SCNC: 135 MMOL/L (ref 136–145)
TACROLIMUS BLD-MCNC: 9.4 UG/L (ref 5–15)
TME LAST DOSE: NORMAL H
TME LAST DOSE: NORMAL H

## 2022-11-21 PROCEDURE — 36415 COLL VENOUS BLD VENIPUNCTURE: CPT | Performed by: NURSE PRACTITIONER

## 2022-11-21 PROCEDURE — 250N000013 HC RX MED GY IP 250 OP 250 PS 637: Performed by: SURGERY

## 2022-11-21 PROCEDURE — 80053 COMPREHEN METABOLIC PANEL: CPT | Performed by: NURSE PRACTITIONER

## 2022-11-21 PROCEDURE — 80197 ASSAY OF TACROLIMUS: CPT | Performed by: NURSE PRACTITIONER

## 2022-11-21 PROCEDURE — 83735 ASSAY OF MAGNESIUM: CPT | Performed by: NURSE PRACTITIONER

## 2022-11-21 PROCEDURE — 250N000011 HC RX IP 250 OP 636

## 2022-11-21 PROCEDURE — 250N000013 HC RX MED GY IP 250 OP 250 PS 637: Performed by: NURSE PRACTITIONER

## 2022-11-21 PROCEDURE — 84100 ASSAY OF PHOSPHORUS: CPT | Performed by: NURSE PRACTITIONER

## 2022-11-21 PROCEDURE — 82248 BILIRUBIN DIRECT: CPT | Performed by: NURSE PRACTITIONER

## 2022-11-21 PROCEDURE — 250N000013 HC RX MED GY IP 250 OP 250 PS 637

## 2022-11-21 PROCEDURE — 999N000111 HC STATISTIC OT IP EVAL DEFER

## 2022-11-21 PROCEDURE — 250N000012 HC RX MED GY IP 250 OP 636 PS 637: Performed by: SURGERY

## 2022-11-21 RX ORDER — CEFDINIR 300 MG/1
300 CAPSULE ORAL EVERY 12 HOURS SCHEDULED
Status: DISCONTINUED | OUTPATIENT
Start: 2022-11-21 | End: 2022-11-21

## 2022-11-21 RX ORDER — BUMETANIDE 1 MG/1
TABLET ORAL
Qty: 22 TABLET | Refills: 0 | Status: ON HOLD | OUTPATIENT
Start: 2022-11-21 | End: 2022-12-06

## 2022-11-21 RX ORDER — CEFDINIR 300 MG/1
300 CAPSULE ORAL EVERY 12 HOURS SCHEDULED
Status: DISCONTINUED | OUTPATIENT
Start: 2022-11-21 | End: 2022-11-21 | Stop reason: HOSPADM

## 2022-11-21 RX ORDER — HYDROMORPHONE HYDROCHLORIDE 2 MG/1
2 TABLET ORAL EVERY 6 HOURS PRN
Qty: 12 TABLET | Refills: 0 | COMMUNITY
Start: 2022-11-21 | End: 2022-11-28

## 2022-11-21 RX ORDER — BUMETANIDE 1 MG/1
1 TABLET ORAL DAILY
Status: DISCONTINUED | OUTPATIENT
Start: 2022-11-23 | End: 2022-11-21 | Stop reason: HOSPADM

## 2022-11-21 RX ORDER — ACETAMINOPHEN 325 MG/1
975 TABLET ORAL EVERY 8 HOURS
Qty: 100 TABLET | Refills: 0 | Status: ON HOLD | OUTPATIENT
Start: 2022-11-21 | End: 2022-12-06

## 2022-11-21 RX ORDER — BUMETANIDE 2 MG/1
2 TABLET ORAL
Status: DISCONTINUED | OUTPATIENT
Start: 2022-11-22 | End: 2022-11-21 | Stop reason: HOSPADM

## 2022-11-21 RX ORDER — CEFDINIR 300 MG/1
300 CAPSULE ORAL EVERY 12 HOURS
Qty: 15 CAPSULE | Refills: 0 | Status: SHIPPED | OUTPATIENT
Start: 2022-11-21 | End: 2022-11-30

## 2022-11-21 RX ADMIN — Medication 1 CAPSULE: at 07:59

## 2022-11-21 RX ADMIN — CEFDINIR 300 MG: 300 CAPSULE ORAL at 14:33

## 2022-11-21 RX ADMIN — LEVOTHYROXINE SODIUM 150 MCG: 0.07 TABLET ORAL at 07:58

## 2022-11-21 RX ADMIN — BUMETANIDE 3 MG: 2 TABLET ORAL at 11:05

## 2022-11-21 RX ADMIN — SENNOSIDES 2 TABLET: 8.6 TABLET, FILM COATED ORAL at 07:59

## 2022-11-21 RX ADMIN — PANCRELIPASE 2 CAPSULE: 24000; 76000; 120000 CAPSULE, DELAYED RELEASE PELLETS ORAL at 08:01

## 2022-11-21 RX ADMIN — VALGANCICLOVIR 450 MG: 450 TABLET, FILM COATED ORAL at 07:59

## 2022-11-21 RX ADMIN — TOPIRAMATE 50 MG: 50 TABLET, FILM COATED ORAL at 08:00

## 2022-11-21 RX ADMIN — FOLIC ACID 1 MG: 1 TABLET ORAL at 07:59

## 2022-11-21 RX ADMIN — ESCITALOPRAM OXALATE 10 MG: 10 TABLET ORAL at 07:59

## 2022-11-21 RX ADMIN — ONDANSETRON 4 MG: 2 INJECTION INTRAMUSCULAR; INTRAVENOUS at 09:01

## 2022-11-21 RX ADMIN — HYDROMORPHONE HYDROCHLORIDE 2 MG: 2 TABLET ORAL at 03:13

## 2022-11-21 RX ADMIN — MYCOPHENOLATE MOFETIL 750 MG: 250 CAPSULE ORAL at 08:00

## 2022-11-21 RX ADMIN — GABAPENTIN 200 MG: 100 CAPSULE ORAL at 14:33

## 2022-11-21 RX ADMIN — TACROLIMUS 6 MG: 5 CAPSULE ORAL at 07:59

## 2022-11-21 RX ADMIN — INSULIN ASPART 2 UNITS: 100 INJECTION, SOLUTION INTRAVENOUS; SUBCUTANEOUS at 11:10

## 2022-11-21 RX ADMIN — ACETAMINOPHEN 975 MG: 325 TABLET, FILM COATED ORAL at 11:05

## 2022-11-21 RX ADMIN — MAGNESIUM OXIDE TAB 400 MG (241.3 MG ELEMENTAL MG) 400 MG: 400 (241.3 MG) TAB at 08:00

## 2022-11-21 RX ADMIN — PANTOPRAZOLE SODIUM 40 MG: 40 TABLET, DELAYED RELEASE ORAL at 07:59

## 2022-11-21 RX ADMIN — HYDROMORPHONE HYDROCHLORIDE 2 MG: 2 TABLET ORAL at 08:22

## 2022-11-21 RX ADMIN — GABAPENTIN 200 MG: 100 CAPSULE ORAL at 07:59

## 2022-11-21 RX ADMIN — DAPSONE 50 MG: 25 TABLET ORAL at 07:58

## 2022-11-21 RX ADMIN — ACETAMINOPHEN 975 MG: 325 TABLET, FILM COATED ORAL at 03:13

## 2022-11-21 RX ADMIN — BUMETANIDE 3 MG: 2 TABLET ORAL at 14:33

## 2022-11-21 RX ADMIN — INSULIN GLARGINE 12 UNITS: 100 INJECTION, SOLUTION SUBCUTANEOUS at 08:01

## 2022-11-21 RX ADMIN — ASPIRIN 81 MG CHEWABLE TABLET 81 MG: 81 TABLET CHEWABLE at 07:58

## 2022-11-21 RX ADMIN — PANCRELIPASE 2 CAPSULE: 24000; 76000; 120000 CAPSULE, DELAYED RELEASE PELLETS ORAL at 14:32

## 2022-11-21 ASSESSMENT — ACTIVITIES OF DAILY LIVING (ADL)
ADLS_ACUITY_SCORE: 38

## 2022-11-21 NOTE — PROGRESS NOTES
"SPIRITUAL HEALTH SERVICES Progress Note  [facility] ([facility]) 7a     met Pt and Pt's , Roly, via Follow Up.     Pt stated \"I know I need to stay positive, but sometimes I just want to feel down.. But I know I need to suck it up\". Pt stated that the person who donated their liver is her \"asim\". They informed this  that Roly has stomach cancer and had a procedure the day after pt had her liver transplant.     Pt feels supported: \"I have prayers from all over, family, friends\". Pt and  watch [employer] on live stream and say that has helped. \"I have felt God with me the entire time\".     Pt and family informed me they are being discharged today. Pt and family welcomed prayer from this .    No follow up at this time    Yoandy Noriega MDiv  Chaplain Resident  Pager [phone]    * [facility] remains available 24/7 for emergent requests/referrals, either by having the switchboard page the on-call  or by entering an ASAP/STAT consult in Epic (this will also page the on-call ). Routine Epic consults receive an initial response within 24 hours.*   "

## 2022-11-21 NOTE — PLAN OF CARE
"BP 98/68 (BP Location: Left arm)   Pulse 72   Temp 97.9  F (36.6  C) (Oral)   Resp 16   Ht 1.676 m (5' 5.98\")   Wt 83.1 kg (183 lb 4.8 oz)   SpO2 98%   BMI 29.60 kg/m         DISCHARGE:  D: Patient with orders to discharge home with .   I: Discharge instructions, medications, & follow ups reviewed with Susan. Copy of discharge summary given to Susan. PIV (s) and staples removed. All belongings packed & sent with patient. Medications filled & picked up at discharge pharmacy. Paperwork faxed.   A: Patient in stable condition. AVSS. Susan had no further questions regarding discharge instructions and medications. Patient transferred out by wheelchair & left with transport at 1530.  P: Plan for follow-up with outpatient clinic.                "

## 2022-11-21 NOTE — PLAN OF CARE
EDEMA: 7A: DEFER    Lymphedema Therapy: Orders received. Chart reviewed and discussed with care team. IP Lymphedema Therapy not indicated due to pt anticipated short IP stay, use of compression garments in home setting, and follow-up OP lymphedema appointment scheduled for this Wednesday, 11/24. Met with pt at bedside, presenting with 1-2+ pitting in bilateral feet, hard 1-2+ edema shin to thighs R> L pitting noted. Provided conservative treatment education in BLE exercises to perform and elevation of lower extremities to encourage fluid movement. Pt verbalized understanding and is familiar with exercises. Encouraged pt to trial home compression garments and to remove if soiled, numbness/tingling, or pain occurs -advised to wait until OP appointment if experiencing any of these symptoms. Educated on wear schedule. Defer discharge recommendations to medical team. Appreciate consult. Will complete orders.

## 2022-11-21 NOTE — DISCHARGE SUMMARY
I evaluated the patient today.  I reviewed the discharge plan with the fellow, and agree with the final assessment and plan for discharge as noted in the discharge summary.  I personally spent  30 minutes or less  today on discharge activities for this patient.      Alexei Alcaraz MD, FRANCISCO JAVIER  Professor of Surgery  [employer]  Surgical Director of Liver Transplant Program  Executive Medical Director of Pediatric Transplantation   [facility]    Discharge Summary  Transplant Surgery    Date of Admission:  2022  Date of Discharge:  2022  Discharging Provider: Ange Sánchez NP/ Alexei Alcaraz M.D.      Discharge Diagnoses   Principal Problem:    Hypervolemia  Active Problems:    Hypomagnesemia    Immunosuppressed status (H)    Hyponatremia    Status post liver transplantation (H)    Bilateral leg pain    Bilateral lower extremity edema    Urinary tract infection      History of Present Illness   Susan Puckett is an 52 year old female with history of  donor liver transplant on 22 for CUETO cirrhosis, Faustino-en-Y gastric bypass in , DM type 2, depression, pancreatic insufficiency, hypothyroidism, seizures, and HTN. Post-transplant course complicated by renal insufficiency, moderate malnutrition, steroid-induced hyperglycemia, and hypervolemia. Presented on 22 with burning lower extremity pain secondary to edema. Admitted for intravenous diuresis.    Hospital Course    S/p liver transplant 22: POD #19. Transaminases and bili normal, AP mildly elevated at 130. Continue ASA 81 mg daily.   Transplant coordinator Edwina Fallon [phone].    Immunosuppression management:  -Tacrolimus goal level 8-12. Continue Tacrolimus 6mg BID.  -Mycophenolate 750 mg BID    Neurology/Psych:  Acute bilateral leg pain: Improved with diuresis. Will discharge on bumex taper (3mg BID x1 day, 2mg BID x1 day, then 1mg BID x  6days or as directed). She will also continue  PTA dose of gabapentin 200mg twice daily.    History of hypoglycemic seizures: Continue PTA Topamax.   History of depression: Continue PTA Lexapro, Trazadone.   History of migraine: Continue Rizatriptan PRN.      Hematology:   Leukopenia: Likely r/t medications. Monitor.  Acute blood loss/Anemia of chronic disease: Hgb stable ~8.     GI/Nutrition:   History of gastric bypass; Moderate malnutrition in the context of acute on chronic illness: Malnutrition diagnosed on previous admission. Continue regular diet, SAMI multivitamin.   History of pancreatic insufficiency: Continue Creon.     Endocrine:   DM type 2: Discharge on Lantus, sliding scale Novolog, carb coverage.   History of hypothyroidism: Continue Synthroid.     Fluid/Electrolytes/Neph:   Hypomagnesemia: Replaced during hospital stay. Continue Mag-ox 400 mg BID.   Hyponatremia, mild: Secondary to hypervolemia. Diuresed.  Hypervolemia associated with renal insufficiency, hypoalbuminemia, edema: Edema with burning bilateral lower extremity pain on admission. 11/19/22 TTE showed EF 55-60%, Grade 1/early diastolic dysfunction, left filling pressures normal. 11/19/22 BLE US negative for DVT. B12 level normal. Initially diuresed with lasix drip.  She was transitioned to Bumex po. She will complete a quick taper to 1mg BID.  Lymphedema was consulted and she was provided lower extremity excercises and instructed to elevate the limbs.       Infectious disease:   E coli UTI: 11/19/22 urine culture +E coli. Initially received broad spectrum coverage with zosyn and then tapered to po cefepime 11/19 to complete 8 additional days.      Prophylaxis: viral (Valcyte), pneumocystis (Dapsone)    Significant Results and Procedures   11/19/22 Echocardiogram:  Global and regional left ventricular function is normal with an EF of 55-60%.  Right ventricular function, chamber size, wall motion, and thickness are Normal. No significant  valvular abnormalities were noted. Grade I or early diastolic dysfunction. Diastolic Doppler findings (E/E' ratio and/or other parameters) suggest left ventricular filling pressures are normal.       Code Status   Full    Primary Care Physician   Harleen Billy    Physical Exam                      Vitals:    11/19/22 0414 11/19/22 2039 11/21/22 0542   Weight: 89.4 kg (197 lb) 87.2 kg (192 lb 4.8 oz) 83.1 kg (183 lb 4.8 oz)     Vital Signs with Ranges     Vital sign ranges:    Temp:  [97.9  F (36.6  C)-98.9  F (37.2  C)] 98.2  F (36.8  C)  Pulse:  [69-77] 73  Resp:  [16] 16  BP: ()/(50-75) 93/63  SpO2:  [93 %-98 %] 98 %  Patient Vitals for the past 24 hrs:    BP Temp Temp src Pulse Resp SpO2 Height Weight   11/20/22 1112 93/63 98.2  F (36.8  C) Oral 73 16 98 % -- --   11/20/22 0137 92/50 98.7  F (37.1  C) Oral 72 16 93 % -- --         General Appearance: in no apparent distress.   Skin: normal, warm, dry  Heart: regular rate and rhythm  Lungs: unlabored on RA  Abdomen: obese. Incision closed with steri strips,  no drainage/ecchymosis/erythema noted.   : voiding without retention  Extremities: edema: present bilaterally. 1+,improving   Neurologic: awake, alert and oriented. Tremor absent. Asterixis: absent.      Time Spent on this Encounter   I, Ange Sánchez NP, personally saw the patient today and spent greater than 30 minutes discharging this patient.    Discharge Disposition   Discharged to home  Condition at discharge: Good    Consultations This Hospital Stay   NURSING TO CONSULT FOR VASCULAR ACCESS CARE IP CONSULT  NURSING TO CONSULT FOR VASCULAR ACCESS CARE IP CONSULT  LYMPHEDEMA THERAPY IP CONSULT  CARE MANAGEMENT / SOCIAL WORK IP CONSULT    Discharge Orders       Adult Mental Health  Referral      Reason for your hospital stay    Hypervolemia, lower extremity swelling and pain  Urinary tract infection     Monitor and record    blood glucose 4 times a day, before meals and at  bedtime  weight every day. Call your coordinator if you experience a 2 lb weight gain or weight loss within a 24 hour period.     When to contact your care team    WHEN TO CONTACT YOUR  COORDINATOR:  Transplant Coordinator [phone]  Notify your coordinator if you have pain over your liver, increased redness or drainage from your incision, fever greater than 100.5F, or decreased urine output, yellowing of your skin or eyes.  Notify your coordinator immediately if you are ever unable to take your immunosuppressive medications for any reason.  If it is outside of office hours, please call the hospital switchboard at [phone] and ask to have the  liver transplant surgery fellow paged for urgent medical questions, or present to the emergency department.     Wound care and dressings    Your staples were removed today. Steri strips were placed over incision.   Leave steri strips in place until they curl and peel off.   Then gently wash with soap and water, but do not scrub them.    keep incision clean and dry. Ok to shower. Do not scrub incision.  Pat dry.  Do not soak or submerge in baths, hot tubs, or pools. Do not apply lotions or powders to incision.     Adult [facility] Follow-up and recommended labs and tests    You will be seen in the clinic for follow up per protocol. DO NOT take  tacrolimus (Prograf) until after your labs are drawn. Remember to bring all of your medications with you to this appointment.      LABS:  Transplant labs (CBC, BMP, hepatic panel, mag, phos, and tacrolimus levels (12 hours after administration)) to be drawn every Monday and Thursday for 4 weeks.    FOLLOW UP APPOINTMENTS:  Remember to always bring an updated medication list to all appointments.    Follow-up OP lymphedema, scheduled for this Wednesday, 11/24/22  Follow up with transplant surgeon, Alexei Alcaraz M.D. scheduled for Monday, 11/28/22   Follow up with transplant hepatology 3 months postoperatively.  Follow up with  primary care provider in 2-4 weeks. (Pt to schedule) for diabetes management.     Call scheduling at [phone] if you have not heard about your appointments within 48 hours after discharge.     Activity    Walk at least four times a day, lift no greater than 10 pounds for  8 weeks from the time of surgery.  No driving while taking narcotics or 3 weeks after surgery.    Bilateral lower extremity exercises and elevation of lower extremities to encourage fluid movement, home compression garments.     Diet    Follow this diet upon discharge:       Regular Diet Adult       Discharge Medications     Discharge Medication List as of 11/21/2022  2:25 PM        START taking these medications    Details   acetaminophen (TYLENOL) 325 MG tablet Take 3 tablets (975 mg) by mouth every 8 hours, Disp-100 tablet, R-0, E-Prescribe      bumetanide (BUMEX) 1 MG tablet Take 3 tablets (3 mg) by mouth 2 times daily for 1 day, THEN 2 tablets (2 mg) 2 times daily for 1 day, THEN 1 tablet (1 mg) 2 times daily for 6 days., Disp-22 tablet, R-0, E-Prescribe      cefdinir (OMNICEF) 300 MG capsule Take 1 capsule (300 mg) by mouth every 12 hours for 8 days, Disp-15 capsule, R-0, E-Prescribe           CONTINUE these medications which have CHANGED    Details   HYDROmorphone (DILAUDID) 2 MG tablet Take 1 tablet (2 mg) by mouth every 6 hours as needed for moderate to severe pain, Disp-12 tablet, R-0, Historical           CONTINUE these medications which have NOT CHANGED    Details   !! amylase-lipase-protease (CREON 24) 55744-70312 units CPEP per EC capsule Take 2 capsules by mouth 3 times daily (with meals), Disp-180 capsule, R-3, E-Prescribe      !! amylase-lipase-protease (CREON 24) 35473-20295 units CPEP per EC capsule Take 1 capsule by mouth Take with snacks or supplements (with snacks), Disp-90 capsule, R-3, E-Prescribe      aspirin (ASA) 81 MG chewable tablet Take 1 tablet (81 mg) by mouth daily, Disp-90 tablet, R-3, E-Prescribe       cyanocobalamin (VITAMIN B-12) 1000 MCG tablet Take 1,000 mcg by mouth every 7 days Take 1 tablet by mouth every 7 days on Wednesdays, Historical      dapsone (ACZONE) 25 MG tablet Take 2 tablets (50 mg) by mouth daily, Disp-60 tablet, R-0, E-Prescribe      folic acid (FOLVITE) 1 MG tablet Take 1 mg by mouth every morning, Historical      insulin aspart (NOVOLOG VIAL) 100 UNITS/ML vial Inject 1-10 Units Subcutaneous 4 times daily (with meals and nightly), No Print Out0.5 unit(s) per 14g cho AC breakfast/lunch AND 0.5 unit(s) per 20 g cho for dinner AND NO meal insulin after 2000 hrs.  Snack coverage:   0800 - 1700 - 0.5 unit(s) per 14  g cho and 1701 - 2000 0.5 unit(s) per 20 g cho      insulin glargine (LANTUS PEN) 100 UNIT/ML pen Inject 12 Units Subcutaneous every morning (before breakfast), Disp-15 mL, TransitionalIf Lantus is not covered by insurance, may substitute Basaglar or Semglee or other insulin glargine product per insurance preference at same dose and frequency.        levothyroxine (SYNTHROID/LEVOTHROID) 150 MCG tablet Take 1 tablet by mouth daily, Historical      magnesium oxide (MAG-OX) 400 MG tablet Take 1 tablet (400 mg) by mouth 2 times daily, Disp-180 tablet, R-3, E-Prescribe      melatonin 5 MG tablet Take 5 mg by mouth At Bedtime, Historical      multivitamin CF FORMULA (DEKAS PLUS) capsule Take 1 capsule by mouth daily, Disp-90 capsule, R-3, E-Prescribe      mycophenolate (GENERIC EQUIVALENT) 250 MG capsule Take 3 capsules (750 mg) by mouth two times daily, Disp-540 capsule, R-1, E-PrescribeTXP DT 11/2/2022 (Liver) TXP Dischg DT 11/10/2022 DX Liver replaced by transplant Z94.4 TX [facility] ([place], [place])      ondansetron (ZOFRAN ODT) 8 MG ODT tab Take 1 tablet by mouth every 8 hours as needed for nausea, Historical      pantoprazole (PROTONIX) 40 MG EC tablet Take 1 tablet (40 mg) by mouth every morning (before breakfast), Disp-90 tablet, R-3,  E-Prescribe      polyethylene glycol (MIRALAX) 17 GM/Dose powder Take 17 g by mouth daily, Disp-510 g, Transitional      simethicone (MYLICON) 80 MG chewable tablet Take 1 tablet (80 mg) by mouth every 6 hours as needed for cramping, Disp-120 tablet, R-0, E-Prescribe      sitagliptin (JANUVIA) 100 MG tablet Take 1 tablet (100 mg) by mouth daily, Disp-30 tablet, R-0, E-Prescribe      tacrolimus (GENERIC EQUIVALENT) 1 MG capsule Take 6 capsules (6 mg) by mouth two times daily, Disp-360 capsule, R-3, E-PrescribeTXP DT 11/2/2022 (Liver) TXP Dischg DT 11/10/2022 DX Liver replaced by transplant Z94.4 TX Center [facility] [place] ([place], [place])      valGANciclovir (VALCYTE) 450 MG tablet Take 1 tablet (450 mg) by mouth daily, Disp-90 tablet, R-3, E-Prescribe      escitalopram (LEXAPRO) 10 MG tablet Take 1 tablet (10 mg) by mouth daily, Disp-60 tablet, R-0, E-Prescribe      gabapentin (NEURONTIN) 100 MG capsule Take 2 capsules (200 mg) by mouth 2 times daily, Disp-120 capsule, R-0, E-Prescribe      sennosides (SENOKOT) 8.6 MG tablet 2 tablets by Oral or Feeding Tube route 2 times daily, Transitional      topiramate (TOPAMAX) 50 MG tablet Take 50 mg by mouth daily , Historical      traZODone (DESYREL) 100 MG tablet Take 150 mg by mouth At Bedtime, Historical      Continuous Blood Gluc  (DEXCOM G6 ) LUNA Use as directed for continuous glucose monitoring., Historical      Continuous Blood Gluc Sensor (DEXCOM G6 SENSOR) MISC Use as directed for continuous glucose monitoring.  Change sensor every 10 days., Historical      Continuous Blood Gluc Transmit (DEXCOM G6 TRANSMITTER) MISC Use as directed for continuous glucose monitoring.  Change every 3 months., Historical      insulin pen needle (BD GRISEL U/F) 32G X 4 MM miscellaneous Inject 1 each SubcutaneousHistorical       !! - Potential duplicate medications found. Please discuss with provider.        STOP taking these medications        calcium carbonate-vitamin D (OSCAL W/D) 500-200 MG-UNIT tablet Comments:   Reason for Stopping:         nystatin (MYCOSTATIN) 934287 UNIT/ML suspension Comments:   Reason for Stopping:             Allergies   Allergies   Allergen Reactions    Methylprednisolone Hives     Per patient, reaction occurred in 1999 or earlier. Broke out in round, flat hives in neck and chest area. No shortness of breath or swelling. Unknown if reaction occurred immediately after administration.     Oxycodone      slurring    Droperidol Anxiety    Nsaids Other (See Comments)     GI bleed.    Prednisone Anxiety, Hives and Rash     Data   Results for orders placed or performed during the hospital encounter of 11/19/22   US Lower Extremity Venous Duplex Bilateral    Narrative    EXAMINATION: DOPPLER VENOUS ULTRASOUND OF BILATERAL LOWER EXTREMITIES,  11/19/2022 9:00 AM     COMPARISON: 11/6/2022    HISTORY: Bilateral lower extremity pain    TECHNIQUE:  Gray-scale evaluation with compression, spectral flow and  color Doppler assessment of the deep venous system of both legs from  groin to knee, and then at the ankles.    FINDINGS:  In both lower extremities, the common femoral, femoral, popliteal and  posterior tibial veins demonstrate normal compressibility and blood  flow.    Subcutaneous edema about the lower extremities.      Impression    IMPRESSION:  No evidence of deep venous thrombosis in either lower extremity.    I have personally reviewed the examination and initial interpretation  and I agree with the findings.    SHAHEEN WAHL DO         SYSTEM ID:  [id number]   XR Chest 2 Views    Narrative    Exam: XR CHEST 2 VIEWS, 11/19/2022 11:32 AM    Indication: hypoxia, lower extremity edema    Comparison: 11/2/2022    Findings:   Stable cardiomediastinal silhouette. No appreciable pleural effusion  or pneumothorax. Low lung volumes with crowding of the pulmonary  vasculature. Minimal streaky right basilar opacities with  asymmetric  elevation of the right hemidiaphragm. Surgical clips and staples in  the upper abdomen.      Impression    Impression:   1. Minimal streaky right basilar opacities, likely atelectasis given  low lung volumes and mild asymmetric elevation of the right  hemidiaphragm, though infection is not excluded.  2. Previous bilateral mixed interstitial and patchy airspace opacities  seen on 11/2/2022 have resolved.     SHAHEEN CR DO VISH         SYSTEM ID:  [id number]   US Liver Transplant    Narrative    EXAMINATION: US LIVER TRANSPLANT, 11/19/2022 11:58 AM     COMPARISON: 11/2/2022, 11/3/2022    HISTORY: bilateral lower extremity and abdominal edema with hx of  recent liver transplant    TECHNIQUE:  Gray-scale, color Doppler and spectral flow analysis.    FINDINGS:     Liver:   The liver demonstrates normal homogeneous echotexture. No  evidence of a focal hepatic mass. There is a small amount of fluid  about the falciform ligament is not definitely appreciated on  previous.    Bile Ducts: The intrahepatic biliary system is of normal caliber.  The  common bile duct measures 3 mm.    Gallbladder: The gallbladder is surgically absent.    Kidneys:   Right kidney:  The right kidney demonstrates normal echotexture with  no evidence of a shadowing stone, focal mass or hydronephrosis.   9.5  cm in long axis dimension. The left kidney was not well visualized.    Pancreas: Poorly visualized due to overlying bowel gas.    Spleen: The spleen is enlarged, measuring 15.9 cm in cranial caudal  dimension.    Visualized portions of the aorta are unremarkable.    LIVER DOPPLER:  Splenic vein:  Patent continuous normal antegrade direction flow  towards the liver, 32 cm/sec.  Extrahepatic portal vein:  Patent continuous antegrade flow, 32  cm/sec.  Portal vein at anastomosis: Patent continuous antegrade flow, 38  cm/sec.  Intrahepatic portal vein:  Patent continuous antegrade flow, 35  cm/sec.  Right portal vein flow is antegrade,  measuring 32 cm/sec.  Left portal vein flow is antegrade, measuring 26 cm/sec.    Inferior vena cava: patent with flow toward the heart throughout..  IVC above anastomosis:  Not visualized  IVC at anastomosis:  122 cm/sec.  Intrahepatic IVC:  35 cm/sec.  Extrahepatic IVC:  17 cm/sec.    Right, mid, left hepatic veins: Patent with flow towards the inferior  vena cava.    Extrahepatic hepatic artery: High resistance waveform with brisk  upstroke with flow towards the liver. 45 cm/sec with resistive index  0.69.  Right hepatic artery: 41 cm/sec with resistive index 0.71.  Left hepatic artery: 38 cm/sec with resistive index 0.70.      Impression    Impression:   1.  Normal grayscale appearance of the transplant liver parenchyma.  There is a small area of loculated fluid along the falciform ligament,  presumably evolving postoperative peritransplant hematoma/seroma.  2.  Patent Doppler evaluation of the liver transplant with improved  resistive indices in the hepatic arteries.    I have personally reviewed the examination and initial interpretation  and I agree with the findings.    SHAHEEN WAHL DO         SYSTEM ID:  [id number]   Echo Complete     Value    LVEF  55-60%    Narrative    [id number]  [id number]  [id number]  [id number]^HALIMA^JORGE     [facility],[place]  Echocardiography Laboratory  [address] [place], [place] [address]     Name: SHARMILA CONNER  MRN: [id number]  : 1970  Study Date: 2022 11:56 AM  Age: 52 yrs  Gender: Female  Patient Location: [facility]  Reason For Study: Edema  Ordering Physician: JORGE VARGHESE  Performed By: Debbie Landa     BSA: 2.0 m2  Height: 66 in  Weight: 197 lb  HR: 66  BP: 107/65 mmHg  ______________________________________________________________________________  Procedure  Complete Portable Echo Adult.  ______________________________________________________________________________  Interpretation Summary  Global and regional left ventricular  function is normal with an EF of 55-60%.  Right ventricular function, chamber size, wall motion, and thickness are  normal.  No significant valvular abnormalities were noted.  This study was compared with the study from 10/20/20: No significant changes  noted.  ______________________________________________________________________________  Left Ventricle  Global and regional left ventricular function is normal with an EF of 55-60%.  Left ventricular size is normal. Relative wall thickness is increased  consistent with concentric remodeling. Grade I or early diastolic dysfunction.  Diastolic Doppler findings (E/E' ratio and/or other parameters) suggest left  ventricular filling pressures are normal.     Right Ventricle  Right ventricular function, chamber size, wall motion, and thickness are  normal.     Atria  The right atria appears normal. Moderate left atrial enlargement is present.     Mitral Valve  The mitral valve is normal. Trace mitral insufficiency is present.     Aortic Valve  Aortic valve is normal in structure and function. The aortic valve is  tricuspid.     Tricuspid Valve  Mild tricuspid insufficiency is present. The right ventricular systolic  pressure is approximated at 17.1 mmHg plus the right atrial pressure.     Pulmonic Valve  The pulmonic valve is normal. Trace pulmonic insufficiency is present.     Vessels  The inferior vena cava was normal in size with preserved respiratory  variability. The aorta root is normal. IVC diameter <2.1 cm collapsing >50%  with sniff suggests a normal RA pressure of 3 mmHg.     Pericardium  No pericardial effusion is present.     Compared to Previous Study  This study was compared with the study from 10/20/20 . No significant changes  noted.  ______________________________________________________________________________  MMode/2D Measurements & Calculations     IVSd: 0.77 cm  LVIDd: 4.5 cm  LVIDs: 2.9 cm  LVPWd: 0.98 cm  FS: 34.5 %  LV mass(C)d: 126.5 grams  LV  mass(C)dI: 63.7 grams/m2  Ao root diam: 3.2 cm  asc Aorta Diam: 3.9 cm  LA Volume (BP): 85.4 ml  LA Volume Index (BP): 42.9 ml/m2  RWT: 0.44     Doppler Measurements & Calculations  MV E max sally: 50.3 cm/sec  MV A max sally: 63.4 cm/sec  MV E/A: 0.79  MV dec slope: 216.5 cm/sec2  MV dec time: 0.23 sec  TR max sally: 206.6 cm/sec  TR max P.1 mmHg  E/E' av.5  Lateral E/e': 4.1  Medial E/e': 7.0     ______________________________________________________________________________  Report approved by: Scott Knight 2022 01:15 PM            Latest Reference Range & Units 22 06:40 22 08:13 22 13:25   Sodium 133 - 144 mmol/L 135 (L)     Potassium 3.4 - 5.3 mmol/L      Potassium 3.4 - 5.3 mmol/L 4.8     Chloride 94 - 109 mmol/L      Chloride 98 - 107 mmol/L 101     Carbon Dioxide 20 - 32 mmol/L      Carbon Dioxide (CO2) 22 - 29 mmol/L 22     Urea Nitrogen 7 - 30 mg/dL      Urea Nitrogen 6.0 - 20.0 mg/dL 19.2     Creatinine 0.52 - 1.04 mg/dL 1.33 (H)     GFR Estimate >60 mL/min/1.73m2 49 (L)     Calcium 8.5 - 10.1 mg/dL 8.3 (L)     Anion Gap 3 - 14 mmol/L 12     Magnesium 1.6 - 2.3 mg/dL 1.9     Phosphorus 2.5 - 4.5 mg/dL 5.0 (H)     Albumin 3.4 - 5.0 g/dL      Albumin 3.5 - 5.2 g/dL 3.0 (L)     Protein Total 6.8 - 8.8 g/dL 5.6 (L)     Alkaline Phosphatase 40 - 150 U/L 130 (H)     ALT 0 - 50 U/L 10     AST 0 - 45 U/L 16     Bilirubin Direct 0.0 - 0.2 mg/dL      Bilirubin Direct 0.00 - 0.30 mg/dL <0.20     Bilirubin Total 0.2 - 1.3 mg/dL 0.3     Folate 4.6 - 34.8 ng/mL      Glucose 70 - 99 mg/dL 208 (H)     Retinol Palmitate 0.00 - 0.10 mg/L      Vitamin A 0.30 - 1.20 mg/L      Vitamin A Interp       Vitamin D Deficiency screening 20 - 75 ug/L      Vitamin E 5.5 - 18.0 mg/L      Vitamin E Gamma 0.0 - 6.0 mg/L      Glucose 70 - 99 mg/dL      GLUCOSE BY METER POCT 70 - 99 mg/dL  167 (H) 104 (H)   WBC 4.0 - 11.0 10e3/uL      Hemoglobin 11.7 - 15.7 g/dL      Hematocrit 35.0 - 47.0 %       Platelet Count 150 - 450 10e3/uL      RBC Count 3.80 - 5.20 10e6/uL      MCV 78 - 100 fL      MCH 26.5 - 33.0 pg      MCHC 31.5 - 36.5 g/dL      RDW 10.0 - 15.0 %      HCV by ROMMEL       HEPATITIS B BY ROMMEL       HIV By Rommel       Tacrolimus Last Dose Date  See Comment     Tacrolimus Last Dose Time  See Comment     Tacrolimus by Tandem Mass Spectrometry 5.0 - 15.0 ug/L 9.4     Haptoglobin 32 - 197 mg/dL      (L): Data is abnormally low  (H): Data is abnormally high

## 2022-11-21 NOTE — PLAN OF CARE
"Vital signs:  Temp: 98.2  F (36.8  C) Temp src: Oral BP: 93/63 Pulse: 73   Resp: 16 SpO2: 98 % O2 Device: None (Room air) Oxygen Delivery: 2 LPM Height: 167.6 cm (5' 5.98\") Weight: 87.2 kg (192 lb 4.8 oz)    7a-7p: Tolerating diet, VSS. OOB to chair for most of the day, ambulated to BR. Pain controlled with PRN meds.     Problem: Plan of Care - These are the overarching goals to be used throughout the patient stay.    Goal: Plan of Care Review  Description: The Plan of Care Review/Shift note should be completed every shift.  The Outcome Evaluation is a brief statement about your assessment that the patient is improving, declining, or no change.  This information will be displayed automatically on your shift note.  Outcome: Progressing  Flowsheets (Taken 11/20/2022 9672)  Plan of Care Reviewed With: patient  Overall Patient Progress: improving   Goal Outcome Evaluation:      Plan of Care Reviewed With: patient    Overall Patient Progress: improvingOverall Patient Progress: improving           "

## 2022-11-21 NOTE — PROGRESS NOTES
Care Management Follow Up    Length of Stay (days): 2  Expected Discharge Date:   Concerns to be Addressed:  Discharge planning.   Patient plan of care discussed at interdisciplinary rounds:     Anticipated Discharge Disposition: Home   Anticipated Discharge Services: Mental Health Resources, Home Care referral for RN pending  Anticipated Discharge DME: None    Additional Information:  Per ANYA Cassidy pt is medically ready for discharge today.     S/p Liver transplant 11/2/22. Was discharged from TCU on 11/17 without home care RN for labs and then admitted to hospital on 11/19. Per SW note on 11/21, pt called insurance and was given a few home care agencies within their network. Unfortunately, the agencies have declined and the other is unreachable.     Spoke with [facility], they declined referral and stated that patient was just at  TCU where many HC referrals were made. Staff was unsuccessful and that is why pt was set up with OP lab appointments.     Updated patient regarding situation who was understanding and agreeable to OP follow up. She plans to go to [facility] for labs.     Declined Home Care Referrals  [facility] - they don't take insurance and don't have staffing.   [facility] - not taking any new referrals until December.   [facility]- declined insurance  [facility] [phone] - referral sent 11/20, 11/21 - attempted to call, was disconnected multiple times      Meli Dominguez, RN, [place]  Float Care Coordinator  Covering 7A RNCC   Pager: [phone]

## 2022-11-21 NOTE — PLAN OF CARE
"BP 94/55 (BP Location: Right arm)   Pulse 68   Temp 97.9  F (36.6  C) (Oral)   Resp 16   Ht 1.676 m (5' 5.98\")   Wt 87.2 kg (192 lb 4.8 oz)   SpO2 93%   BMI 31.05 kg/m      Shift: 0509-3293  Isolation Status: NA  Diet: Regular diet.  LDA: PIV w/ Lasix drip  Infusion(s): Lasix @ 10 mg/hr  VS: AVSS on RA.  Pain/Nausea/PRN: Discomfort reported, but no pain. No reported nausea.  Lab: Hg of 7.6.  B @ 0200.  Neuro: A&O x4. Calls appropriately.  Behaviors: Calm, cooperative, and pleasant  Respiratory: Regular depth and pattern; unlabored; expansion symmetrical; breath sounds clear and equal bilaterally; no cough  Cardiac: Regular rhythm, S1, S2; no reported chest pain  GI/: LBM: . Voiding adequately and without difficulty.  Skin: Stapled abdominal incision.   Mobility: Up w/ SBA.  Plan: -CK WNL  -CXR with atelectasis  -TTE with EF 55-60%, Grade 1/early diastolic dysfunction, left filling pressures normal.  -BLE US negative for DVT  -Gabapentin increased to TID  -Vitamin B12 injectionx1, level WNL  -Continue Lasix gtt 10 mg/hr  -Lymphedema consult  "

## 2022-11-22 ENCOUNTER — VIRTUAL VISIT (OUTPATIENT)
Dept: PHARMACY | Facility: CLINIC | Age: 52
End: 2022-11-22
Attending: TRANSPLANT SURGERY
Payer: COMMERCIAL

## 2022-11-22 ENCOUNTER — TELEPHONE (OUTPATIENT)
Dept: TRANSPLANT | Facility: CLINIC | Age: 52
End: 2022-11-22

## 2022-11-22 DIAGNOSIS — Z94.4 LIVER REPLACED BY TRANSPLANT (H): Primary | ICD-10-CM

## 2022-11-22 DIAGNOSIS — Z94.4 LIVER TRANSPLANTED (H): ICD-10-CM

## 2022-11-22 DIAGNOSIS — R52 PAIN: ICD-10-CM

## 2022-11-22 DIAGNOSIS — G43.909 MIGRAINE WITHOUT STATUS MIGRAINOSUS, NOT INTRACTABLE, UNSPECIFIED MIGRAINE TYPE: ICD-10-CM

## 2022-11-22 DIAGNOSIS — G47.00 INSOMNIA, UNSPECIFIED TYPE: ICD-10-CM

## 2022-11-22 DIAGNOSIS — K86.89 PANCREATIC INSUFFICIENCY: ICD-10-CM

## 2022-11-22 DIAGNOSIS — E86.1 HYPOVOLEMIA: ICD-10-CM

## 2022-11-22 PROCEDURE — 99607 MTMS BY PHARM ADDL 15 MIN: CPT | Performed by: PHARMACIST

## 2022-11-22 PROCEDURE — 99605 MTMS BY PHARM NP 15 MIN: CPT | Performed by: PHARMACIST

## 2022-11-22 RX ORDER — GABAPENTIN 100 MG/1
200 CAPSULE ORAL 3 TIMES DAILY
COMMUNITY
End: 2022-11-23

## 2022-11-22 NOTE — TELEPHONE ENCOUNTER
Received call from Coni NARANJO,from [employer] # [phone] Ext# [phone] patient called insurance company very upset and crying that no one from [facility] transplant team helped with getting her set up with home care and scheduling appts.,  Please call patient and please call Nandini NARANJO, with outcome. (If that is not HIPA)

## 2022-11-22 NOTE — Clinical Note
Patient's Gabapentin was changed in the hospital to 200mg 3 TIMES DAILY, she will need a new script.

## 2022-11-22 NOTE — TELEPHONE ENCOUNTER
Called Susan to review upcoming plan after receiving a call from hr insurance provider that she is very upset with us for not setting up home care or scheduling her appts.     No answer, left a message to call me back to discuss, main number provided. Called Susan's spouse Rivera who was available and informed me they are currently on the phone with Davi Clayton for pharmacy visit. Rivera said he is not sure what appts Susan is referring to and will ask her to call me back when they are finished with the pharmacy visit. Main number provided.     Returned Coni's call at [employer] ([phone], ext [phone]), no answer, left a voicemail that I have contacted Susan and her  and am awaiting a call back to confirm their concerns and assist in what they might need scheduled. Asked Coni to call us back for further discussion if needed.

## 2022-11-22 NOTE — PATIENT INSTRUCTIONS
"Recommendations from today's MTM visit:                                                       1. [facility] mail order will call you three weeks post discharge, but if your dose changes, call the number on the back of the pill box to talk to them earlier, [phone]. If your doctor sends over a new prescription to the mail order pharmacy you will have to call them to set up the order. They have an Hans called \"My [facility] RX\" as well.   2. Capsaicin cream, apply three times daily. You will have to consistently use this for a week for full effect for neuropathy treatment.  3. Have mail order call and transfer your prescriptions.   4. Reduce Trazodone dose to 50mg (1/2 tablet) at bedtime, follow-up with PCP to get a lower dose tablet.   5. Reduce Acetaminophen 650mg 3 TIMES DAILY or a max dose of 2000mg per day.    Follow-up: 2/7    It was great speaking with you today.  I value your experience and would be very thankful for your time in providing feedback in our clinic survey. In the next few days, you may receive an email or text message from [employer] with a link to a survey related to your  clinical pharmacist.\"     To schedule another MTM appointment, please call the clinic directly or you may call the MTM scheduling line at [phone] or toll-free at [phone].     My Clinical Pharmacist's contact information:                                                      Please feel free to contact me with any questions or concerns you have.      Davi Clayton, Mahnaz  MTM Pharmacist    Phone: [phone]     "

## 2022-11-22 NOTE — PROGRESS NOTES
"Medication Therapy Management (MTM) Encounter    ASSESSMENT:                            Medication Adherence/Access: No issues identified patient would like to fill all medications at mail-order pharmacy.    Liver Transplant: Stable.    Type 2 Diabetes: No changes by MTM follow-up with endocrine.    Hypervolemia: Weights declining.  No changes.    Pancreatic insufficiency: Stable.    Pain: Patient's gabapentin was increased in the hospital, but a new prescription was not sent.  Will let transplant team know she will likely need updated dosage at 200 mg 3 times a day.  Recommended she could try capsaicin over-the-counter pain cream to help with neuropathy.  I also recommended patient keep below 2000 mg a day for acetaminophen.    Headaches: Stable.    Insomnia: Patient falls deeply asleep when using 100 mg of trazodone does not wake up from her Dexcom alarms.  She has had seizures in the past due to low blood sugars, recommended she use a lower dose of trazodone to see if it is as sedating.  She can follow-up with primary care to get a lower dose prescription if needed.    PLAN:                            Pt to...  1. [employer] [facility] order will call you three weeks post discharge, but if your dose changes, call the number on the back of the pill box to talk to them earlier, [phone]. If your doctor sends over a new prescription to the mail order pharmacy you will have to call them to set up the order. They have an Hans called \"My [facility]\" as well.   2. Capsaicin cream, apply three times daily. You will have to consistently use this for a week for full effect for neuropathy treatment.  3. Have mail order call and transfer your prescriptions.   4. Reduce Trazodone dose to 50mg (1/2 tablet) at bedtime, follow-up with PCP to get a lower dose tablet.   5. Reduce Acetaminophen 650mg 3 TIMES DAILY or a max dose of 2000mg per day.    Txp team FYI  1. Patient is taking Gabapentin 200mg 3 TIMES DAILY as instructed in the " hospital, will need a new script.     Follow-up: 2/7    SUBJECTIVE/OBJECTIVE:                          Susan Puckett is a 52 year old female called for a transitions of care visit. She was discharged from [facility] on 11/21 for hypervolemia post liver transplant.      Reason for visit: initial post transplant med review.    Allergies/ADRs: Reviewed in chart  Past Medical History: Reviewed in chart  Tobacco: She reports that she has never smoked. She has never used smokeless tobacco.  Alcohol: not currently using    Medication Adherence/Access: Patient uses pill box(es) and has assistance with medication administration: family member, Roly.  Patient takes medications 2 time(s) per day. 8 and 8  Per patient, misses medication 0 times per week. Missed txp medications in ER on Saturday.   The patient fills medications at [facility]: YES.    Liver Transplant:  Current immunosuppressants include Tacrolimus 6mg twice daily and Mycophenolate Mofetil 750mg twice daily.  Pt reports Nausea without vomiting. Taking Ondansetron ODT 4mg as needed about once daily. Also has tremors.   Transplant date: 11/2/22  Estimated Creatinine Clearance: 55 mL/min (A) (based on SCr of 1.33 mg/dL (H)).  CMV prophylaxis: CrCl 40 to 59 mL/minute: Valcyte 450 mg once daily Treat 3 months post tx   PJP prophylaxis: Dapsone 50mg daily for 6 months post txp  Vascular prophylaxis: Aspirin 81mg daily. Denies gastrointestinal bleed.   PPI use: Pantoprazole 40mg daily. Denies GERD.   Supplements: Magnesium 400mg twice daily , B12 weekly, Folic acid 1mg daily  Tx Coordinator: Edwina Fallon Tx MD: Dr. Vizcaino, Using Med Card: Yes  UTI: Cefdinir 300mg twice daily.   Immunizations: annual flu shot 2022; [id number]:  2019; Prevnar 13/15/20: unknown; TDaP:  10/2012; Shingrix: unknown, HBV: no immunity, COVID: Pfizer-BioNTech x3  Magnesium   Date Value Ref Range Status   11/21/2022 1.9 1.7 - 2.3 mg/dL Final   12/08/2020 1.8 1.6 - 2.3 mg/dL Final     Type 2 Diabetes:   Currently taking Januvia 100mg daily, Lantus 10 units daily, Novolog 1 unit per 15 grams of carbs before meals. . Patient is not experiencing side effects.  Blood sugar monitoring: Continuous Glucose Monitor. Ranges (patient reported): Managed by endocrinology, patient says her blood sugars have been good.  Did have a low yesterday of 70.  Symptoms of low blood sugar? Hot, weak, blurred vision, shaky, BG was 70 yesterday  Symptoms of high blood sugar? none  Lab Results   Component Value Date    A1C 5.7 11/02/2022    A1C 6.2 11/01/2022    A1C 7.3 07/09/2022    A1C 5.1 02/18/2022    A1C 5.6 01/10/2022      Latest Reference Range & Units 11/20/22 14:35 11/20/22 18:14 11/20/22 23:26 11/21/22 01:49 11/21/22 08:13 11/21/22 13:25   GLUCOSE BY METER POCT 70 - 99 mg/dL 292 (H) 171 (H) 189 (H) 169 (H) 167 (H) 104 (H)   (H): Data is abnormally high    Hypervolemia: Pt is taking Bumetanide 3mg twice daily x1 day, then 2mg twice daily x1 day, then 1 mg twice daily x 6 days . Swelling in legs and feet. She is wrapping her legs and seeing lymphoedema clinic tomorrow.   Clinic weights: 11/ lb 5 oz  , 11/21- 183 lbs 5 oz, 179.6 lbs at home today.    Pancreatic insufficiency: Pt is taking Creon 24k-76k 2 capsules with meals and 1 capsule with snacks. Been taking for a few years.     Pain: Pt is taking Acetaminophen 975mg 3 TIMES DAILY, Hydromorphone 2mg twice daily as needed, Gabapentin 200mg 3 TIMES DAILY (neuropathy on right thigh, numb and burning).  Neuropathy is poorly controlled at this time.    Headaches: Pt is taking Topiramate 50mg once daily. This works well per patient.     Insomnia: Current medications include: trazodone 100mg at bedtime . Patient reports no issues at this time. When she takes this she is knocked out, Dexcom does not wake her up when the low low alarm goes off.       Today's Vitals: There were no vitals taken for this visit.  ----------------  Post Discharge Medication Reconciliation Status:  discharge medications reconciled and changed, per note/orders.    I spent 55 minutes with this patient today. All changes were made via collaborative practice agreement with Dr. Vizcaino. A copy of the visit note was provided to the patient's provider(s).    The patient was sent via [employer] a summary of these recommendations.     Davi Clayton, PharmD  [facility] Pharmacist    Phone: [phone]     Telemedicine Visit Details  Type of service:  Telephone visit  Start Time: 3:07 PM  End Time: 4:02 PM  Originating Location (pt. Location): Home      Distant Location (provider location):  Off-site  Provider has received verbal consent for a visit from the patient? Yes     Medication Therapy Recommendations  Insomnia    Current Medication: traZODone (DESYREL) 100 MG tablet   Rationale: Undesirable effect - Adverse medication event - Safety   Recommendation: Decrease Dose   Status: Accepted per CPA         Pain    Current Medication: acetaminophen (TYLENOL) 325 MG tablet   Rationale: Dose too high - Dosage too high - Safety   Recommendation: Decrease Dose   Status: Patient Agreed - Adherence/Education          Current Medication: gabapentin (NEURONTIN) 100 MG capsule   Rationale: Synergistic therapy - Needs additional medication therapy - Indication   Recommendation: Start Medication - capsaicin 0.025 % external cream   Status: Accepted - no CPA Needed

## 2022-11-22 NOTE — TELEPHONE ENCOUNTER
Susan returned my call. Per Susan, there were multiple home care agencies that were contacted prior to discharge and they either weren't in her network, don't take her insurance or they did not have staffing available to see Susan. She is going to schedule outpatient lab visits at [facility] but would like to check in with Juanita if there has been any progress on an agency that could start services. She is aware there may not be a option and she would need to continue outpatient lab visits.     Susan received 12 Dilaudid tablet and is asking about a refill. She said she isn't using much, has only 6 tablets left. Message sent to Dr. Morgan (Edwina cc'elsi) to ask about either a refill on Dilaudid or an alternative. Can't take Oxy due to previous reaction.     Susan also asked to clarify Gabapentin which was increased to TID dosing prior to discharge but current RX states only BID.     Denied any other questions or concerns. We reviewed taking VS and recording in her record book and notifying us of any abnormal values. Susan is scheduled to see Dr. Alcaraz on Monday 11/28.

## 2022-11-23 ENCOUNTER — TELEPHONE (OUTPATIENT)
Dept: TRANSPLANT | Facility: CLINIC | Age: 52
End: 2022-11-23

## 2022-11-23 ENCOUNTER — LAB (OUTPATIENT)
Dept: LAB | Facility: CLINIC | Age: 52
End: 2022-11-23
Payer: COMMERCIAL

## 2022-11-23 ENCOUNTER — HOSPITAL ENCOUNTER (OUTPATIENT)
Dept: OCCUPATIONAL THERAPY | Facility: CLINIC | Age: 52
Setting detail: THERAPIES SERIES
Discharge: HOME | End: 2022-11-23
Attending: INTERNAL MEDICINE
Payer: COMMERCIAL

## 2022-11-23 DIAGNOSIS — Z94.4 LIVER REPLACED BY TRANSPLANT (H): ICD-10-CM

## 2022-11-23 DIAGNOSIS — G62.9 PERIPHERAL NEUROPATHY: Primary | ICD-10-CM

## 2022-11-23 DIAGNOSIS — Z95.828 S/P TIPS (TRANSJUGULAR INTRAHEPATIC PORTOSYSTEMIC SHUNT): ICD-10-CM

## 2022-11-23 DIAGNOSIS — K74.60 LIVER CIRRHOSIS SECONDARY TO NASH (H): ICD-10-CM

## 2022-11-23 DIAGNOSIS — Z98.84 H/O GASTRIC BYPASS: ICD-10-CM

## 2022-11-23 DIAGNOSIS — D50.8 OTHER IRON DEFICIENCY ANEMIA: ICD-10-CM

## 2022-11-23 DIAGNOSIS — K75.81 LIVER CIRRHOSIS SECONDARY TO NASH (H): ICD-10-CM

## 2022-11-23 DIAGNOSIS — E87.6 HYPOKALEMIA: ICD-10-CM

## 2022-11-23 LAB
ALBUMIN SERPL-MCNC: 2.6 G/DL (ref 3.4–5)
ALP SERPL-CCNC: 136 U/L (ref 40–150)
ALT SERPL W P-5'-P-CCNC: 17 U/L (ref 0–50)
ANION GAP SERPL CALCULATED.3IONS-SCNC: 7 MMOL/L (ref 3–14)
AST SERPL W P-5'-P-CCNC: 13 U/L (ref 0–45)
BILIRUB DIRECT SERPL-MCNC: 0.2 MG/DL (ref 0–0.2)
BILIRUB SERPL-MCNC: 0.3 MG/DL (ref 0.2–1.3)
BUN SERPL-MCNC: 26 MG/DL (ref 7–30)
CALCIUM SERPL-MCNC: 7.9 MG/DL (ref 8.5–10.1)
CHLORIDE BLD-SCNC: 105 MMOL/L (ref 94–109)
CO2 SERPL-SCNC: 25 MMOL/L (ref 20–32)
CREAT SERPL-MCNC: 1.34 MG/DL (ref 0.52–1.04)
DEPRECATED CALCIDIOL+CALCIFEROL SERPL-MC: 17 UG/L (ref 20–75)
ERYTHROCYTE [DISTWIDTH] IN BLOOD BY AUTOMATED COUNT: 15.2 % (ref 10–15)
FOLATE SERPL-MCNC: 18.4 NG/ML (ref 4.6–34.8)
GFR SERPL CREATININE-BSD FRML MDRD: 48 ML/MIN/1.73M2
GLUCOSE BLD-MCNC: 283 MG/DL (ref 70–99)
HCT VFR BLD AUTO: 27.5 % (ref 35–47)
HGB BLD-MCNC: 8.7 G/DL (ref 11.7–15.7)
MAGNESIUM SERPL-MCNC: 1.6 MG/DL (ref 1.6–2.3)
MCH RBC QN AUTO: 30.4 PG (ref 26.5–33)
MCHC RBC AUTO-ENTMCNC: 31.6 G/DL (ref 31.5–36.5)
MCV RBC AUTO: 96 FL (ref 78–100)
PHOSPHATE SERPL-MCNC: 5 MG/DL (ref 2.5–4.5)
PLATELET # BLD AUTO: 289 10E3/UL (ref 150–450)
POTASSIUM BLD-SCNC: 4.8 MMOL/L (ref 3.4–5.3)
PROT SERPL-MCNC: 5.9 G/DL (ref 6.8–8.8)
RBC # BLD AUTO: 2.86 10E6/UL (ref 3.8–5.2)
SODIUM SERPL-SCNC: 137 MMOL/L (ref 133–144)
TACROLIMUS BLD-MCNC: 11.5 UG/L (ref 5–15)
TME LAST DOSE: NORMAL H
TME LAST DOSE: NORMAL H
WBC # BLD AUTO: 4 10E3/UL (ref 4–11)

## 2022-11-23 PROCEDURE — 83735 ASSAY OF MAGNESIUM: CPT

## 2022-11-23 PROCEDURE — 97140 MANUAL THERAPY 1/> REGIONS: CPT | Mod: GO

## 2022-11-23 PROCEDURE — 84100 ASSAY OF PHOSPHORUS: CPT

## 2022-11-23 PROCEDURE — 87516 HEPATITIS B DNA AMP PROBE: CPT | Mod: 90

## 2022-11-23 PROCEDURE — 80197 ASSAY OF TACROLIMUS: CPT

## 2022-11-23 PROCEDURE — 87521 HEPATITIS C PROBE&RVRS TRNSC: CPT | Mod: 90

## 2022-11-23 PROCEDURE — 83010 ASSAY OF HAPTOGLOBIN QUANT: CPT

## 2022-11-23 PROCEDURE — 87535 HIV-1 PROBE&REVERSE TRNSCRPJ: CPT | Mod: 90

## 2022-11-23 PROCEDURE — 97166 OT EVAL MOD COMPLEX 45 MIN: CPT | Mod: GO

## 2022-11-23 PROCEDURE — 82306 VITAMIN D 25 HYDROXY: CPT

## 2022-11-23 PROCEDURE — 80053 COMPREHEN METABOLIC PANEL: CPT

## 2022-11-23 PROCEDURE — 84590 ASSAY OF VITAMIN A: CPT | Mod: 90

## 2022-11-23 PROCEDURE — 85027 COMPLETE CBC AUTOMATED: CPT

## 2022-11-23 PROCEDURE — 36415 COLL VENOUS BLD VENIPUNCTURE: CPT

## 2022-11-23 PROCEDURE — 82248 BILIRUBIN DIRECT: CPT

## 2022-11-23 PROCEDURE — 82746 ASSAY OF FOLIC ACID SERUM: CPT

## 2022-11-23 PROCEDURE — 84446 ASSAY OF VITAMIN E: CPT | Mod: 90

## 2022-11-23 RX ORDER — GABAPENTIN 100 MG/1
200 CAPSULE ORAL 3 TIMES DAILY
Qty: 90 CAPSULE | Refills: 3 | Status: SHIPPED | OUTPATIENT
Start: 2022-11-23 | End: 2022-12-13

## 2022-11-23 RX ORDER — METHOCARBAMOL 500 MG/1
500 TABLET, FILM COATED ORAL 4 TIMES DAILY PRN
Qty: 20 TABLET | Refills: 0 | Status: SHIPPED | OUTPATIENT
Start: 2022-11-23 | End: 2022-11-28

## 2022-11-23 NOTE — TELEPHONE ENCOUNTER
"Spoke to Susan. She reports she just took her BP at home and she is 90/62, HR 71. Denies feeling lightheaded/dizzy, states she \"feels tired\". Last dose of Dilaudid at 7:15 AM, recently took her Bumex. Instructed patient to drink water, eat a meal. If BP drops lower that 90 systolic and she develops symptoms, to call me or present to ER for evaluation of hypotension. Discussed with patient that her pain medication and Bumex can cause lower BP. Historically, patient has been 90's systolic in the hospital, last BP from 11/21 read 98/68.    Message to Dr Morgan to review/advise.    ADDENDUM:    Per Dr Morgan:    \"your response sounds reasonable, if she feels worse, develops fever or lower BPs then she should come to the ER\"    Spoke to Susan and relayed information. She understands and will monitor her BP & symptoms.                "

## 2022-11-23 NOTE — PROGRESS NOTES
Davi Clayton, [facility]  Delbert Morgan MD; Edwina Fallon RN  Patient's Gabapentin was changed in the hospital to 200mg 3 TIMES DAILY, she will need a new script.     Prescription updated. Per Davi's note, wishing to use mail order pharmacy, new prescription sent.

## 2022-11-23 NOTE — PROGRESS NOTES
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   Pertinent history of current problem (PT: include personal factors and/or comorbidities that impact the POC; OT: include additional occupational profile info) Pt is a 51 yo female referred to lymphedema therapy to address BLE and truncal edema s/p liver failure with transplant on 11/2. Pt reported she did have some edema prior to surgery, but it became worse after. Pt was readmitted to the hospital 11/19-11/21 due to hypervolemia and severe pain/burning in her swollen LEs. Pt was put on diuretics, but still endorses severe/sensitive pain in the thighs. She was given Tubigrip to wear below the knees, but upper legs have not been addressed beyond elevation and diuretics.   Surgical / medical history reviewed Yes   Prior level of functional mobility indep   Prior treatment Elevation;Diuretics   Community support Family / friend caregiver  (spouse Roly present today and very supportive)   Living environment House / [facility]   Living environment comments no concerns   Current assistive devices Front wheeled walker   Abuse Screen (yes response referral indicated)   Feels Unsafe at Home or Work/School no   Feels Threatened by Someone no   Does Anyone Try to Keep You From Having Contact with Others or Doing Things Outside Your Home? no   Physical Signs of Abuse Present no   Cognitive Status   Orientation Orientation to person, place and time   Level of  consciousness Alert   Edema Exam / Assessment   Skin condition comments BLE with skin intact. Tissue is tight, firm and shiny below the knees with decreased hair growth, tight and firm up into thighs with most edema concentrated on lateral thighs. 1-2 plus firm pitting edema from toes to groin with pockets of firm pitting edema on lateral hips/trunk.   Girth Measurements   Girth Measurements Refer to separate girth measurement flowsheet   Volume LE   Right LE (mL) 5796   Left LE (mL) 5480   Range of Motion   ROM comments WFL   Strength   Strength comments WFL, deconditioned   Palpation   Palpation upper legs very sensitive to touch   Activities of Daily Living   Activities of Daily Living needing more assist since surgery, unable to bend down and reach feet due to incision   Transfers   Transfers mod I/HHA depending on pain   Gait / Locomotion   Gait / Locomotion mod I with FWW   Sensory   Sensory perception comments pt reports decreased sensation along lateral thighs   Planned Edema Interventions   Planned edema interventions Manual lymph drainage;Gradient compression bandaging;Fit for compression garment;Exercises;Precautions to prevent infection / exacerbation;Education;Manual therapy;ADL training;Skin care / precautions;Soft tissue mobilization;Home management program development   Clinical Impression   Criteria for skilled therapeutic intervention met Yes   Therapy diagnosis BLE and truncal lymphedema   Assessment of Occupational Performance 3-5 Performance Deficits   Identified Performance Deficits impaired fit of clothing/shoes, impaired safety with functional mobility, at risk for progression of edema and skin breakdown if not addressed, severe pain affecting mobility   Clinical Decision Making (Complexity) Moderate complexity   Treatment Frequency 2x/week   Treatment duration 6 weeks   Patient / family and/or staff in agreement with plan of care Yes   Risks and benefits of therapy have been explained Yes    Clinical impression comments Pt presents with pitting edema from toes to groin as well as abdomen, with severe pain/tenderness on thighs from skin being stretched. Pt is at risk for worsening of symptoms and impaired safety with functional mobility and would benefit from skilled treatment to reduce BLE and truncal edema, fit for compression garments and provide long term management education.   Goals   Edema Eval Goals 1;2;3   Goal 1   Goal identifier Volume   Goal description Pt will demonstrate a reduction of at least 200 mL in BLE to improve skin integrity, safety with mobility and fit of clothing   Target date 02/21/23   Goal 2   Goal identifier Garments   Goal description Pt will be fit for and demonstrate independence using new compression garments for long term edema management as evidenced by a no more than 50 mL increase in volume after transitioning into garments.   Target date 02/21/23   Goal 3   Goal identifier Home program   Goal description Pt will verbalize understanding of long term edema management including following recommended wear schedule for compression garments, manual techniques, skin care and exercise.   Target date 02/21/23   Total Evaluation Time   OT [place], Moderate Complexity Minutes (58282) 20

## 2022-11-23 NOTE — TELEPHONE ENCOUNTER
Per Dr Morgan, OK to send script for Robaxin.   1 tablet (500 mg) 4x daily PRN, 20 tablets dispensed approved.    Spoke to Susan and Rivera. Had lymphedema appt this morning, Rivera learned how to assist in wrapping Susan's legs. Currently wrapped from feet to upper thigh. Encouraged patient to elevate legs while resting, sleeping. Patient agrees.    Using walker to ambulate, states it is going well. Took shower yesterday, states it went well. Reminded patient to let soap & water run over incision site, pat dry lightly. Patient understands.    Encouraged patient to stay hydrated, drink plenty of fluids. Eat meals high in protein, low in sugar.     Asked patient if she will be seeing Sancho Gaines soon, she is requesting information on how to reach him and make appt. MyC message sent to patient to call [phone] to schedule.    Spoke highly about liver support group, states staff in hospital not aware and wants it more well known to patients. Will bring up at meeting next Thursday, message sent to Juanita as well.

## 2022-11-25 ENCOUNTER — TELEPHONE (OUTPATIENT)
Dept: TRANSPLANT | Facility: CLINIC | Age: 52
End: 2022-11-25

## 2022-11-25 LAB
HAPTOGLOB SERPL-MCNC: 118 MG/DL (ref 32–197)
HBV DNA SERPL QL NAA+PROBE: NORMAL
HCV RNA SERPL QL NAA+PROBE: NORMAL
HIV1+2 RNA SERPL QL NAA+PROBE: NORMAL

## 2022-11-25 NOTE — TELEPHONE ENCOUNTER
Returned Susan's call regarding PT referral. Per Bhavana, RNCC note from 11/18, referral placed. Informed Susan that I sent a message to Bhavana regarding PT and someone will most likely reach out to her beginning of next week.

## 2022-11-25 NOTE — TELEPHONE ENCOUNTER
Susan calling regarding Referral for Physical Therapy when she Discharged from the Hospital on 11/21/22 she has called to make an appointment and is being told they haven't received it yet

## 2022-11-26 LAB
A-TOCOPHEROL VIT E SERPL-MCNC: 4.1 MG/L
ANNOTATION COMMENT IMP: NORMAL
BETA+GAMMA TOCOPHEROL SERPL-MCNC: 0.8 MG/L
RETINYL PALMITATE SERPL-MCNC: 0.03 MG/L
VIT A SERPL-MCNC: 0.41 MG/L

## 2022-11-26 NOTE — TELEPHONE ENCOUNTER
"Susan called to report that she had taken topamax this evening with her 8p meds.  She became nauseous and had two episodes of emesis.  She remains afebrile.  She is now feeling \"a little better\" and her  reports she was able to drink water and keep that down prior to paging.  They both inquire if they can take Tylenol for discomfort and was prescribed post transplant.  They were advised this is appropriate.  They were also advised if Susan is unable to keep her medications dodwn, she will need to be seen for evaluation.  They both stated understanding.  "

## 2022-11-27 ENCOUNTER — TELEPHONE (OUTPATIENT)
Dept: TRANSPLANT | Facility: CLINIC | Age: 52
End: 2022-11-27

## 2022-11-27 NOTE — TELEPHONE ENCOUNTER
TRIAGE CALL:  Page received at 1210    ISSUE: patient paged with complaint of severe headache/Migraine    Patient states she is taking 650 mg of Tylenol TID with no improvement in headache symptoms. Patient reports taking Migraine medication Topamax, that was prescribed pre transplant was taken on Friday  however patient states she became sick and vomited after taking medication.      Patient has been sleeping and using lavender heating pad for symptom control.  Reports she is able to hydrate and keep down I.    Apt with Dr Pearce tomorrow. Patient told to continue taking tylenol as prescribed and rest.  Recommended being evaluated at [facility] ED if symptoms worsen,  Or is N/V become and issue and patient is unable to keep down IS.      Patient V/U    Ana María Grant RN   Transplant Coordinator  [phone]

## 2022-11-28 ENCOUNTER — TELEPHONE (OUTPATIENT)
Dept: TRANSPLANT | Facility: CLINIC | Age: 52
End: 2022-11-28

## 2022-11-28 ENCOUNTER — LAB (OUTPATIENT)
Dept: LAB | Facility: CLINIC | Age: 52
End: 2022-11-28
Attending: TRANSPLANT SURGERY
Payer: COMMERCIAL

## 2022-11-28 ENCOUNTER — OFFICE VISIT (OUTPATIENT)
Dept: TRANSPLANT | Facility: CLINIC | Age: 52
End: 2022-11-28
Attending: TRANSPLANT SURGERY
Payer: COMMERCIAL

## 2022-11-28 ENCOUNTER — TELEPHONE (OUTPATIENT)
Dept: PHYSICAL THERAPY | Facility: CLINIC | Age: 52
End: 2022-11-28

## 2022-11-28 VITALS
HEART RATE: 77 BPM | WEIGHT: 177 LBS | BODY MASS INDEX: 28.58 KG/M2 | DIASTOLIC BLOOD PRESSURE: 77 MMHG | OXYGEN SATURATION: 97 % | SYSTOLIC BLOOD PRESSURE: 103 MMHG

## 2022-11-28 DIAGNOSIS — Z94.4 LIVER REPLACED BY TRANSPLANT (H): ICD-10-CM

## 2022-11-28 DIAGNOSIS — F41.9 ANXIETY: ICD-10-CM

## 2022-11-28 LAB
ALBUMIN SERPL BCG-MCNC: 3.2 G/DL (ref 3.5–5.2)
ALP SERPL-CCNC: 130 U/L (ref 35–104)
ALT SERPL W P-5'-P-CCNC: 8 U/L (ref 10–35)
ANION GAP SERPL CALCULATED.3IONS-SCNC: 10 MMOL/L (ref 7–15)
AST SERPL W P-5'-P-CCNC: 21 U/L (ref 10–35)
BILIRUB DIRECT SERPL-MCNC: <0.2 MG/DL (ref 0–0.3)
BILIRUB SERPL-MCNC: 0.4 MG/DL
BUN SERPL-MCNC: 17.5 MG/DL (ref 6–20)
CALCIUM SERPL-MCNC: 8.6 MG/DL (ref 8.6–10)
CHLORIDE SERPL-SCNC: 104 MMOL/L (ref 98–107)
CREAT SERPL-MCNC: 1.29 MG/DL (ref 0.51–0.95)
DEPRECATED HCO3 PLAS-SCNC: 24 MMOL/L (ref 22–29)
ERYTHROCYTE [DISTWIDTH] IN BLOOD BY AUTOMATED COUNT: 15.2 % (ref 10–15)
GFR SERPL CREATININE-BSD FRML MDRD: 50 ML/MIN/1.73M2
GLUCOSE SERPL-MCNC: 322 MG/DL (ref 70–99)
HCT VFR BLD AUTO: 28.6 % (ref 35–47)
HGB BLD-MCNC: 9.1 G/DL (ref 11.7–15.7)
MAGNESIUM SERPL-MCNC: 1.3 MG/DL (ref 1.7–2.3)
MCH RBC QN AUTO: 30.6 PG (ref 26.5–33)
MCHC RBC AUTO-ENTMCNC: 31.8 G/DL (ref 31.5–36.5)
MCV RBC AUTO: 96 FL (ref 78–100)
PHOSPHATE SERPL-MCNC: 4.7 MG/DL (ref 2.5–4.5)
PLATELET # BLD AUTO: 197 10E3/UL (ref 150–450)
POTASSIUM SERPL-SCNC: 4.6 MMOL/L (ref 3.4–5.3)
PROT SERPL-MCNC: 5.8 G/DL (ref 6.4–8.3)
RBC # BLD AUTO: 2.97 10E6/UL (ref 3.8–5.2)
SODIUM SERPL-SCNC: 138 MMOL/L (ref 136–145)
TACROLIMUS BLD-MCNC: 9.3 UG/L (ref 5–15)
TME LAST DOSE: NORMAL H
TME LAST DOSE: NORMAL H
WBC # BLD AUTO: 3.5 10E3/UL (ref 4–11)

## 2022-11-28 PROCEDURE — 36415 COLL VENOUS BLD VENIPUNCTURE: CPT | Performed by: PATHOLOGY

## 2022-11-28 PROCEDURE — 85027 COMPLETE CBC AUTOMATED: CPT | Performed by: PATHOLOGY

## 2022-11-28 PROCEDURE — 80053 COMPREHEN METABOLIC PANEL: CPT | Performed by: PATHOLOGY

## 2022-11-28 PROCEDURE — 82248 BILIRUBIN DIRECT: CPT | Performed by: PATHOLOGY

## 2022-11-28 PROCEDURE — 83735 ASSAY OF MAGNESIUM: CPT | Performed by: PATHOLOGY

## 2022-11-28 PROCEDURE — 84100 ASSAY OF PHOSPHORUS: CPT | Performed by: PATHOLOGY

## 2022-11-28 PROCEDURE — 80197 ASSAY OF TACROLIMUS: CPT | Performed by: TRANSPLANT SURGERY

## 2022-11-28 PROCEDURE — G0463 HOSPITAL OUTPT CLINIC VISIT: HCPCS | Performed by: TRANSPLANT SURGERY

## 2022-11-28 PROCEDURE — 99213 OFFICE O/P EST LOW 20 MIN: CPT | Performed by: TRANSPLANT SURGERY

## 2022-11-28 RX ORDER — TRAZODONE HYDROCHLORIDE 50 MG/1
25 TABLET, FILM COATED ORAL AT BEDTIME
Status: ON HOLD | COMMUNITY
Start: 2022-11-28 | End: 2024-03-04

## 2022-11-28 RX ORDER — METHOCARBAMOL 500 MG/1
500 TABLET, FILM COATED ORAL 4 TIMES DAILY PRN
Qty: 20 TABLET | Refills: 0 | Status: ON HOLD | OUTPATIENT
Start: 2022-11-28 | End: 2022-12-06

## 2022-11-28 RX ORDER — RIZATRIPTAN BENZOATE 10 MG/1
10 TABLET ORAL PRN
COMMUNITY
Start: 2022-11-28 | End: 2023-02-07

## 2022-11-28 RX ORDER — ESCITALOPRAM OXALATE 20 MG/1
30 TABLET ORAL DAILY
COMMUNITY
Start: 2022-11-28

## 2022-11-28 ASSESSMENT — PAIN SCALES - GENERAL: PAINLEVEL: SEVERE PAIN (7)

## 2022-11-28 NOTE — TELEPHONE ENCOUNTER
"Spoke to Susan. She was at PCP office today, states her provider increased her Lexapro to 20 mg daily, decreased Trazodone to 50 mg at bedtime and added Maxalt PRN for migraines (per pharmacy, OK to take). Topamax taken off the list as she developed N/V on 11/25. Med list updated.    Patient still c/o tenderness in abdomen, states her Robaxin was refilled today in clinic. States she is trying to eat but everything tastes \"the same\" and \"bland\". Currently drinking 2 Ensure's/day. Encouraged patient to continue eating foods high in protein.    Discussed  this morning with labs, patient states she didn't take her insulin prior to arriving to clinic. States at her PCP office it was down to 67. Instructed patient to recheck her BG frequently to ensure it's not too low or too high and cover with insulin PRN.    /65, HR 68 at PCP office today. Instructed patient to continue taking BP's frequently to assess for hypotension.    While speaking with patient, patient intermittently trailing off mid sentence. Patient states \"I'm just tired\". Asked patient if it was due to sedation, patient reports \"no, I'm just tired\". Instructed patient to have  take BP, BG readings after phone call. Patient will. Patient reports she has no dilaudid tablets left, med list updated.              "

## 2022-11-28 NOTE — TELEPHONE ENCOUNTER
Called Susan and left VM to discuss PT referral. Plan to refax referral to [facility], requesting phone, fax #.    ADDENDUM:  Message from ROSE Hilton that the therapy manager has refaxed the referral and followed up with the patient.

## 2022-11-28 NOTE — NURSING NOTE
Chief Complaint   Patient presents with     Post-op Visit     LIVER TX 11/2/22     Blood pressure 103/77, pulse 77, weight 80.3 kg (177 lb), SpO2 97 %, not currently breastfeeding.    Harleen Ott CMA

## 2022-11-28 NOTE — TELEPHONE ENCOUNTER
Patient called [facility] with questions re: her edema therapy.  Pt reports that her spouse has been applying GCB, takes about 1 hour and loosens up in a day. Pt asked about using the edema wear instead of wrapping. She is educated on the benefit from wrapping with GCB and understands. She has seen improvement with decreased weight and smaller LEs.  Pt encouraged to try to keep GCB but if needing more mobility and already seeing improvement, can use edemawear but wrap every couple days even for just 24hours.      Pt asked how to buy/where to by edemawear and given website and recommended size but her CLT.    Reviewed bandage care and laundering.      Pt appreciative. All questions answered.     Che Scanlon, PT,CLT-HARRISON

## 2022-11-28 NOTE — LETTER
11/28/2022         RE: Susan Puckett  [address] [id number]        Dear Colleague,    Thank you for referring your patient, Susan Puckett, to the [facility] TRANSPL[facility]. Please see a copy of my visit note below.    Transplant Surgery -OUTPATIENT IMMUNOSUPPRESSION PROGRESS NOTE    Date of Visit: 11/28/2022    Transplants:  11/2/2022 (Liver); Postoperative day:  26  ASSESMENT AND PLAN:  1.Graft Function:Liver allograft: no rejection or technical problems.  2.Immunosuppression Management: keep tacrolimus levels at 10  3.Hypertension:ok  4.Renal Function: good  5.Lab frequency: twice weekly  6.Other:  Wound healthy  Pedal edema better, continue bumex 1 mg po daily  Headaches better    Date: November 28, 2022    Transplant:  [x]                             Liver [x]                              Kidney []                             Pancreas []                              Other:             Chief Complaint:Post-op Visit (LIVER TX 11/2/22)  Doing well  History of Present Illness:  Post liver transplant    Patient Active Problem List   Diagnosis     Anxiety     Ascites     Benign tumor of colon     Brow ptosis, bilateral     Chronic diarrhea     Chronic peritoneal effusion     Colitis     Endometriosis     Hepatic encephalopathy     History of gastric bypass     Insomnia     Hypothyroidism     HSV-1 (herpes simplex virus 1) infection     Iron deficiency anemia     Raynaud phenomenon     Ptosis of eyelid     Pleural effusion associated with hepatic disorder     Thrombocytopenia (H)     Other headache syndrome     Nausea     Major depressive disorder, recurrent episode, moderate (H)     Infertility management     History of gastric ulcer     Allergic rhinitis     Tubular adenoma of colon     Vitamin D deficiency     Visual field defect     Type 2 diabetes mellitus without complication, without long-term current use of insulin (H)     Primary hypothyroidism     H/O gastric bypass      Exposure to COVID-19 virus     Abdominal pain, epigastric     Steroid-induced hyperglycemia     Hypomagnesemia     Prolonged Q-T interval on ECG     Liver transplanted (H)     Acute kidney failure with tubular necrosis (H)     Hypervolemia     Immunosuppressed status (H)     Anemia in other chronic diseases classified elsewhere     Hyponatremia     Hypoalbuminemia     GEORGETTE (acute kidney injury) (H)     Acute post-operative pain     Seizure due to hypoglycemia (H)     Migraine     Moderate malnutrition (H)     Pancreatic insufficiency     Constipation     Hypophosphatemia     Generalized weakness     Status post liver transplantation (H)     Bilateral leg pain     Acute cystitis without hematuria     Bilateral lower extremity edema     Urinary tract infection     SOCIAL /FAMILY HISTORY: [x]                  No recent change    Past Medical History:   Diagnosis Date     Anemia      Anxiety      Cold sore      Depressive disorder      Diabetes (H)     Type 2 DM/No Insulin      Encephalopathy      Esophageal varices without bleeding (H)     grade I     History of blood transfusion     10/2019     History of seizure     diabetic seizure     Insomnia      Liver cirrhosis secondary to CUETO (H) 05/28/2020     Migraine      Pleural effusion      Thrombocytopenia (H)      Thyroid disease      Past Surgical History:   Procedure Laterality Date     APPENDECTOMY      1989     BENCH LIVER N/A 11/2/2022    Procedure: Bench liver;  Surgeon: Delbert Morgan MD;  Location: [facility] OR     COLONOSCOPY      1/2020 at [facility]      ENT SURGERY  1998    tempanoplasty     GI SURGERY      EGD August 2020 at [facility]      GYN SURGERY  2010    laparoscopic ablation     IR TRANSVEN INTRAHEPATIC PORTOSYST SHUNT  11/5/2021     MAMMOPLASTY REDUCTION BILATERAL  2004     [place] STAB PHELBECTOMY VARICOSE VEINS, LESS THAN 10 INCISIONS, ONE EXTREMITY  2020     RNY gastric bypass  01/2006    retoocolic, retrogastric, [facility]     TONSILLECTOMY &  ADENOIDECTOMY Bilateral 1978     TRANSPLANT LIVER RECIPIENT  DONOR N/A 2022    Procedure: TRANSPLANT, LIVER, RECIPIENT,  DONOR;  Surgeon: Delbert Morgan MD;  Location: [facility] OR     WISDOM TEETH EXTRACTION       Social History     Socioeconomic History     Marital status:      Spouse name: Not on file     Number of children: Not on file     Years of education: Not on file     Highest education level: Bachelor's degree (e.g., BA, AB, BS)   Occupational History     Not on file   Tobacco Use     Smoking status: Never     Smokeless tobacco: Never   Substance and Sexual Activity     Alcohol use: Not Currently     Comment: Last drink was in 2017     Drug use: Never     Sexual activity: Not on file   Other Topics Concern     Parent/sibling w/ CABG, MI or angioplasty before 65F 55M? Not Asked   Social History Narrative     Not on file     Social Determinants of Health     Financial Resource Strain: Not on file   Food Insecurity: Not on file   Transportation Needs: Not on file   Physical Activity: Not on file   Stress: Not on file   Social Connections: Not on file   Intimate Partner Violence: Not on file   Housing Stability: Not on file     Prescription Medications as of 2022       Rx Number Disp Refills Start End Last Dispensed Date Next Fill Date Owning Pharmacy    acetaminophen (TYLENOL) 325 MG tablet  100 tablet 0 2022    [facility] - [place], [place] - [address]    Sig: Take 3 tablets (975 mg) by mouth every 8 hours    Class: E-Prescribe    Route: Oral    amylase-lipase-protease (CREON 24) 68844-77562 units CPEP per EC capsule  180 capsule 3 2022    [facility] [place], [place] - [address]    Sig: Take 2 capsules by mouth 3 times daily (with meals)    Class: E-Prescribe    Route: Oral    amylase-lipase-protease (CREON 24) 95528-97976 units CPEP per EC capsule  90 capsule 3 2022    [facility] -  [address]    Sig: Take 1 capsule by mouth Take with snacks or supplements (with snacks)    Class: E-Prescribe    Route: Oral    aspirin (ASA) 81 MG chewable tablet  90 tablet 3 11/18/2022    [facility] [address] [facility]    Sig: Take 1 tablet (81 mg) by mouth daily    Class: E-Prescribe    Route: Oral    bumetanide (BUMEX) 1 MG tablet  22 tablet 0 11/21/2022 11/29/2022   [facility] - [place], [place] - [address] [place] [address]    Sig: Take 3 tablets (3 mg) by mouth 2 times daily for 1 day, THEN 2 tablets (2 mg) 2 times daily for 1 day, THEN 1 tablet (1 mg) 2 times daily for 6 days.    Class: E-Prescribe    Route: Oral    capsaicin 0.035 % CREA            Sig: Externally apply topically 3 times daily    Class: Historical    Route: Apply externally    cefdinir (OMNICEF) 300 MG capsule  15 capsule 0 11/21/2022 11/29/2022   [facility] - [place], [place] - [address] [place] [address]    Sig: Take 1 capsule (300 mg) by mouth every 12 hours for 8 days    Class: E-Prescribe    Route: Oral    Continuous Blood Gluc  (DEXCOM G6 ) LUNA    6/1/2021        Sig: Use as directed for continuous glucose monitoring.    Class: Historical    Continuous Blood Gluc Sensor (DEXCOM G6 SENSOR) MISC    6/1/2021        Sig: Use as directed for continuous glucose monitoring.  Change sensor every 10 days.    Class: Historical    Continuous Blood Gluc Transmit (DEXCOM G6 TRANSMITTER) MISC    6/1/2021        Sig: Use as directed for continuous glucose monitoring.  Change every 3 months.    Class: Historical    cyanocobalamin (VITAMIN B-12) 1000 MCG tablet    9/2/2020    [id number] [place] [facility] [address] [facility]    Sig: Take 1,000 mcg by mouth every 7 days Take 1 tablet by mouth every 7 days on Wednesdays    Class: Historical    Route: Oral    dapsone (ACZONE) 25 MG tablet  60 tablet 0 11/17/2022    [facility]  [place], [place] - [address]    Sig: Take 2 tablets (50 mg) by mouth daily    Class: E-Prescribe    Route: Oral    escitalopram (LEXAPRO) 10 MG tablet  60 tablet 0 2/21/2022    [facility] - [place], [place] - [address]    Sig: Take 1 tablet (10 mg) by mouth daily    Class: E-Prescribe    Route: Oral    folic acid (FOLVITE) 1 MG tablet    2/26/2020    [facility] [place], [place] - [address] [facility]    Sig: Take 1 mg by mouth every morning    Class: Historical    Route: Oral    gabapentin (NEURONTIN) 100 MG capsule  90 capsule 3 11/23/2022    [facility] - [place], [place] - [address]    Sig: Take 2 capsules (200 mg) by mouth 3 times daily    Class: E-Prescribe    Route: Oral    HYDROmorphone (DILAUDID) 2 MG tablet  12 tablet 0 11/21/2022        Sig: Take 1 tablet (2 mg) by mouth every 6 hours as needed for moderate to severe pain    Class: Historical    Route: Oral    insulin aspart (NOVOLOG VIAL) 100 UNITS/ML vial    11/16/2022        Sig: Inject 1-10 Units Subcutaneous 4 times daily (with meals and nightly)    Class: No Print Out    Notes to Pharmacy: 0.5 unit(s) per 14g cho AC breakfast/lunch AND 0.5 unit(s) per 20 g cho for dinner AND NO meal insulin after 2000 hrs.  Snack coverage:   0800 - 1700 - 0.5 unit(s) per 14 g cho and 1701 - 2000 0.5 unit(s) per 20 g cho    Route: Subcutaneous    insulin glargine (LANTUS PEN) 100 UNIT/ML pen  15 mL  11/11/2022        Sig: Inject 12 Units Subcutaneous every morning (before breakfast)    Class: Transitional    Notes to Pharmacy: If Lantus is not covered by insurance, may substitute Basaglar or Semglee or other insulin glargine product per insurance preference at same dose and frequency.      Route: Subcutaneous    insulin pen needle (BD GRISEL U/F) 32G X 4 MM miscellaneous    6/1/2021        Sig: Inject 1 each Subcutaneous    Class: Historical    Route: Subcutaneous    levothyroxine  (SYNTHROID/LEVOTHROID) 150 MCG tablet    5/23/2022        Sig: Take 1 tablet by mouth daily    Class: Historical    Route: Oral    magnesium oxide (MAG-OX) 400 MG tablet  180 tablet 3 11/18/2022    [facility] [place] [address]    Sig: Take 1 tablet (400 mg) by mouth 2 times daily    Class: E-Prescribe    Route: Oral    melatonin 5 MG tablet            Sig: Take 5 mg by mouth At Bedtime    Class: Historical    Route: Oral    methocarbamol (ROBAXIN) 500 MG tablet  20 tablet 0 11/23/2022    [facility] [address] [place] [address]    Sig: Take 1 tablet (500 mg) by mouth 4 times daily as needed for muscle spasms    Class: E-Prescribe    Route: Oral    multivitamin CF FORMULA (DEKAS PLUS) capsule  90 capsule 3 3/1/2022    [facility] [address] [place] [address]    Sig: Take 1 capsule by mouth daily    Class: E-Prescribe    Route: Oral    mycophenolate (GENERIC EQUIVALENT) 250 MG capsule  540 capsule 1 11/18/2022    [facility] [place] [address] [address]    Sig: Take 3 capsules (750 mg) by mouth two times daily    Class: E-Prescribe    Notes to Pharmacy: TXP DT 11/2/2022 (Liver) TXP Dischg DT 11/10/2022 DX Liver replaced by transplant Z94.4 TX [facility] ([place], [place])    Route: Oral    ondansetron (ZOFRAN ODT) 8 MG ODT tab    7/6/2022        Sig: Take 1 tablet by mouth every 8 hours as needed for nausea    Class: Historical    Route: Oral    pantoprazole (PROTONIX) 40 MG EC tablet  90 tablet 3 11/18/2022    [facility] [place] [address] [address]    Sig: Take 1 tablet (40 mg) by mouth every morning (before breakfast)    Class: E-Prescribe    Route: Oral    polyethylene glycol (MIRALAX) 17 GM/Dose powder  510 g  11/10/2022        Sig: Take 17 g by mouth daily    Class: Transitional    Route: Oral    simethicone (MYLICON) 80 MG  chewable tablet  120 tablet 0 11/17/2022 12/17/2022   [facility] [place], [place] - [address]    Sig: Take 1 tablet (80 mg) by mouth every 6 hours as needed for cramping    Class: E-Prescribe    Route: Oral    sitagliptin (JANUVIA) 100 MG tablet  30 tablet 0 11/17/2022    [facility] [place], [place] - [address]    Sig: Take 1 tablet (100 mg) by mouth daily    Class: E-Prescribe    Route: Oral    tacrolimus (GENERIC EQUIVALENT) 1 MG capsule  360 capsule 3 11/18/2022    [facility] [place], [place] - [address]    Sig: Take 6 capsules (6 mg) by mouth two times daily    Class: E-Prescribe    Notes to Pharmacy: TXP DT 11/2/2022 (Liver) TXP Dischg DT 11/10/2022 DX Liver replaced by transplant Z94.4 TX [facility] ([place], [place])    Route: Oral    topiramate (TOPAMAX) 50 MG tablet    10/27/2021        Sig: Take 50 mg by mouth daily     Class: Historical    Route: Oral    traZODone (DESYREL) 100 MG tablet    2/21/2022    [facility] [place], [place] - [address]    Sig: Take 150 mg by mouth At Bedtime    Class: Historical    Route: Oral    valGANciclovir (VALCYTE) 450 MG tablet  90 tablet 3 11/18/2022    [facility] [place], [place] - [address]    Sig: Take 1 tablet (450 mg) by mouth daily    Class: E-Prescribe    Route: Oral        Methylprednisolone, Oxycodone, Droperidol, Nsaids, and Prednisone   REVIEW OF SYSTEMS (check box if normal)  [x]               GENERAL  [x]                 PULMONARY [x]                GENITOURINARY  [x]                CNS                 [x]                 CARDIAC  [x]                 ENDOCRINE  [x]                EARS,NOSE,THROAT [x]                 GASTROINTESTINAL [x]                 NEUROLOGIC    [x]                MUSCLOSKELTAL  [x]                  HEMATOLOGY      PHYSICAL EXAM (check box if normal)/77    Pulse 77   Wt 80.3 kg (177 lb)   SpO2 97%   BMI 28.58 kg/m          [x]            GENERAL:    [x]       EYES:  ICTERIC   []        YES  []                    NO  [x]           EXTREMITIES: Clubbing []       Y     [x]           N    [x]           EARS, NOSE, THROAT: Membranes Moist    YES   [x]                   NO []                  [x]           LUNGS:  CLEAR    YES       [x]                  NO    []                                [x]           SKIN: Jaundice           YES       []                  NO    [x]                   Rash: YES       []                  NO    [x]                                     [x]             HEART: Regular Rate          YES       [x]                  NO    []                   Incision Clean:  YES       [x]                  NO    []                                [x]                    ABDOMEN: Organomegaly YES       []                  NO    [x]                       [x]                    NEUROLOGICAL:  Nonfocal  YES       [x]                  NO    []                       [x]                    Hernia YES       []                  NO    [x]                   PSYCHIATRIC:  Appropriate  YES       [x]                  NO    []                       OTHER:                                                                                                   PAIN SCALE:: 3      Again, thank you for allowing me to participate in the care of your patient.        Sincerely,        Alexei Alcaraz MD

## 2022-11-28 NOTE — PROGRESS NOTES
Transplant Surgery -OUTPATIENT IMMUNOSUPPRESSION PROGRESS NOTE    Date of Visit: 11/28/2022    Transplants:  11/2/2022 (Liver); Postoperative day:  26  ASSESMENT AND PLAN:  1.Graft Function:Liver allograft: no rejection or technical problems.  2.Immunosuppression Management: keep tacrolimus levels at 10  3.Hypertension:ok  4.Renal Function: good  5.Lab frequency: twice weekly  6.Other:  Wound healthy  Pedal edema better, continue bumex 1 mg po daily  Headaches better    Date: November 28, 2022    Transplant:  [x]                             Liver [x]                              Kidney []                             Pancreas []                              Other:             Chief Complaint:Post-op Visit (LIVER TX 11/2/22)  Doing well  History of Present Illness:  Post liver transplant    Patient Active Problem List   Diagnosis     Anxiety     Ascites     Benign tumor of colon     Brow ptosis, bilateral     Chronic diarrhea     Chronic peritoneal effusion     Colitis     Endometriosis     Hepatic encephalopathy     History of gastric bypass     Insomnia     Hypothyroidism     HSV-1 (herpes simplex virus 1) infection     Iron deficiency anemia     Raynaud phenomenon     Ptosis of eyelid     Pleural effusion associated with hepatic disorder     Thrombocytopenia (H)     Other headache syndrome     Nausea     Major depressive disorder, recurrent episode, moderate (H)     Infertility management     History of gastric ulcer     Allergic rhinitis     Tubular adenoma of colon     Vitamin D deficiency     Visual field defect     Type 2 diabetes mellitus without complication, without long-term current use of insulin (H)     Primary hypothyroidism     H/O gastric bypass     Exposure to COVID-19 virus     Abdominal pain, epigastric     Steroid-induced hyperglycemia     Hypomagnesemia     Prolonged Q-T interval on ECG     Liver transplanted (H)     Acute kidney failure with tubular necrosis (H)     Hypervolemia      Immunosuppressed status (H)     Anemia in other chronic diseases classified elsewhere     Hyponatremia     Hypoalbuminemia     GEORGETTE (acute kidney injury) (H)     Acute post-operative pain     Seizure due to hypoglycemia (H)     Migraine     Moderate malnutrition (H)     Pancreatic insufficiency     Constipation     Hypophosphatemia     Generalized weakness     Status post liver transplantation (H)     Bilateral leg pain     Acute cystitis without hematuria     Bilateral lower extremity edema     Urinary tract infection     SOCIAL /FAMILY HISTORY: [x]                  No recent change    Past Medical History:   Diagnosis Date     Anemia      Anxiety      Cold sore      Depressive disorder      Diabetes (H)     Type 2 DM/No Insulin      Encephalopathy      Esophageal varices without bleeding (H)     grade I     History of blood transfusion     10/2019     History of seizure     diabetic seizure     Insomnia      Liver cirrhosis secondary to CUETO (H) 2020     Migraine      Pleural effusion      Thrombocytopenia (H)      Thyroid disease      Past Surgical History:   Procedure Laterality Date     APPENDECTOMY           BENCH LIVER N/A 2022    Procedure: Bench liver;  Surgeon: Delbert Morgan MD;  Location: [facility] OR     COLONOSCOPY      2020 at [facility]      ENT SURGERY      tempanoplasty     GI SURGERY      EGD 2020 at [facility]      GYN SURGERY      laparoscopic ablation     IR TRANSVEN INTRAHEPATIC PORTOSYST SHUNT  2021     MAMMOPLASTY REDUCTION BILATERAL       ME STAB PHELBECTOMY VARICOSE VEINS, LESS THAN 10 INCISIONS, ONE EXTREMITY       RNY gastric bypass  2006    retoocolic, retrogastric, [facility]     TONSILLECTOMY & ADENOIDECTOMY Bilateral      TRANSPLANT LIVER RECIPIENT  DONOR N/A 2022    Procedure: TRANSPLANT, LIVER, RECIPIENT,  DONOR;  Surgeon: Delbert Morgan MD;  Location: [facility] OR     WISDOM TEETH EXTRACTION       Social History      Socioeconomic History     Marital status:      Spouse name: Not on file     Number of children: Not on file     Years of education: Not on file     Highest education level: Bachelor's degree (e.g., BA, AB, BS)   Occupational History     Not on file   Tobacco Use     Smoking status: Never     Smokeless tobacco: Never   Substance and Sexual Activity     Alcohol use: Not Currently     Comment: Last drink was in 2017     Drug use: Never     Sexual activity: Not on file   Other Topics Concern     Parent/sibling w/ CABG, MI or angioplasty before 65F 55M? Not Asked   Social History Narrative     Not on file     Social Determinants of Health     Financial Resource Strain: Not on file   Food Insecurity: Not on file   Transportation Needs: Not on file   Physical Activity: Not on file   Stress: Not on file   Social Connections: Not on file   Intimate Partner Violence: Not on file   Housing Stability: Not on file     Prescription Medications as of 11/28/2022       Rx Number Disp Refills Start End Last Dispensed Date Next Fill Date Owning Pharmacy    acetaminophen (TYLENOL) 325 MG tablet  100 tablet 0 11/21/2022    [facility] [place], [place] - [address]    Sig: Take 3 tablets (975 mg) by mouth every 8 hours    Class: E-Prescribe    Route: Oral    amylase-lipase-protease (CREON 24) 62470-44869 units CPEP per EC capsule  180 capsule 3 11/18/2022    [facility] [place] [address]    Sig: Take 2 capsules by mouth 3 times daily (with meals)    Class: E-Prescribe    Route: Oral    amylase-lipase-protease (CREON 24) 44111-34567 units CPEP per EC capsule  90 capsule 3 11/18/2022    [facility] [place] [address] [address]    Sig: Take 1 capsule by mouth Take with snacks or supplements (with snacks)    Class: E-Prescribe    Route: Oral    aspirin (ASA) 81 MG chewable tablet  90 tablet 3 11/18/2022    [facility]  [place], [place] - [address]    Sig: Take 1 tablet (81 mg) by mouth daily    Class: E-Prescribe    Route: Oral    bumetanide (BUMEX) 1 MG tablet  22 tablet 0 11/21/2022 11/29/2022   [place], [place] - [address]    Sig: Take 3 tablets (3 mg) by mouth 2 times daily for 1 day, THEN 2 tablets (2 mg) 2 times daily for 1 day, THEN 1 tablet (1 mg) 2 times daily for 6 days.    Class: E-Prescribe    Route: Oral    capsaicin 0.035 % CREA            Sig: Externally apply topically 3 times daily    Class: Historical    Route: Apply externally    cefdinir (OMNICEF) 300 MG capsule  15 capsule 0 11/21/2022 11/29/2022   [place], [place] - [address]    Sig: Take 1 capsule (300 mg) by mouth every 12 hours for 8 days    Class: E-Prescribe    Route: Oral    Continuous Blood Gluc  (DEXCOM G6 ) LUNA    6/1/2021        Sig: Use as directed for continuous glucose monitoring.    Class: Historical    Continuous Blood Gluc Sensor (DEXCOM G6 SENSOR) MISC    6/1/2021        Sig: Use as directed for continuous glucose monitoring.  Change sensor every 10 days.    Class: Historical    Continuous Blood Gluc Transmit (DEXCOM G6 TRANSMITTER) MISC    6/1/2021        Sig: Use as directed for continuous glucose monitoring.  Change every 3 months.    Class: Historical    cyanocobalamin (VITAMIN B-12) 1000 MCG tablet    9/2/2020    [id number] [address] [facility]    Sig: Take 1,000 mcg by mouth every 7 days Take 1 tablet by mouth every 7 days on Wednesdays    Class: Historical    Route: Oral    dapsone (ACZONE) 25 MG tablet  60 tablet 0 11/17/2022    [place], [place] - [address]    Sig: Take 2 tablets (50 mg) by mouth daily    Class: E-Prescribe    Route: Oral    escitalopram (LEXAPRO) 10 MG tablet  60 tablet 0 2/21/2022    [place], [place] -  [address]    Sig: Take 1 tablet (10 mg) by mouth daily    Class: E-Prescribe    Route: Oral    folic acid (FOLVITE) 1 MG tablet    2/26/2020    [id number] [place], [place] - [address] [address] [address] [facility]    Sig: Take 1 mg by mouth every morning    Class: Historical    Route: Oral    gabapentin (NEURONTIN) 100 MG capsule  90 capsule 3 11/23/2022    [facility] - [place], [place] - [address]    Sig: Take 2 capsules (200 mg) by mouth 3 times daily    Class: E-Prescribe    Route: Oral    HYDROmorphone (DILAUDID) 2 MG tablet  12 tablet 0 11/21/2022        Sig: Take 1 tablet (2 mg) by mouth every 6 hours as needed for moderate to severe pain    Class: Historical    Route: Oral    insulin aspart (NOVOLOG VIAL) 100 UNITS/ML vial    11/16/2022        Sig: Inject 1-10 Units Subcutaneous 4 times daily (with meals and nightly)    Class: No Print Out    Notes to Pharmacy: 0.5 unit(s) per 14g cho AC breakfast/lunch AND 0.5 unit(s) per 20 g cho for dinner AND NO meal insulin after 2000 hrs.  Snack coverage:   0800 - 1700 - 0.5 unit(s) per 14 g cho and 1701 - 2000 0.5 unit(s) per 20 g cho    Route: Subcutaneous    insulin glargine (LANTUS PEN) 100 UNIT/ML pen  15 mL  11/11/2022        Sig: Inject 12 Units Subcutaneous every morning (before breakfast)    Class: Transitional    Notes to Pharmacy: If Lantus is not covered by insurance, may substitute Basaglar or Semglee or other insulin glargine product per insurance preference at same dose and frequency.      Route: Subcutaneous    insulin pen needle (BD GRISEL U/F) 32G X 4 MM miscellaneous    6/1/2021        Sig: Inject 1 each Subcutaneous    Class: Historical    Route: Subcutaneous    levothyroxine (SYNTHROID/LEVOTHROID) 150 MCG tablet    5/23/2022        Sig: Take 1 tablet by mouth daily    Class: Historical    Route: Oral    magnesium oxide (MAG-OX) 400 MG tablet  180 tablet 3 11/18/2022    [facility] - [place], [place] -  [address]    Sig: Take 1 tablet (400 mg) by mouth 2 times daily    Class: E-Prescribe    Route: Oral    melatonin 5 MG tablet            Sig: Take 5 mg by mouth At Bedtime    Class: Historical    Route: Oral    methocarbamol (ROBAXIN) 500 MG tablet  20 tablet 0 11/23/2022    [facility] [address] [place] [id number] [facility]    Sig: Take 1 tablet (500 mg) by mouth 4 times daily as needed for muscle spasms    Class: E-Prescribe    Route: Oral    multivitamin CF FORMULA (DEKAS PLUS) capsule  90 capsule 3 3/1/2022    [facility] [address] [place] [id number] [facility]    Sig: Take 1 capsule by mouth daily    Class: E-Prescribe    Route: Oral    mycophenolate (GENERIC EQUIVALENT) 250 MG capsule  540 capsule 1 11/18/2022    [facility] [address]    Sig: Take 3 capsules (750 mg) by mouth two times daily    Class: E-Prescribe    Notes to Pharmacy: TXP DT 11/2/2022 (Liver) TXP Dischg DT 11/10/2022 DX Liver replaced by transplant Z94.4 TX [facility] ([place], [place])    Route: Oral    ondansetron (ZOFRAN ODT) 8 MG ODT tab    7/6/2022        Sig: Take 1 tablet by mouth every 8 hours as needed for nausea    Class: Historical    Route: Oral    pantoprazole (PROTONIX) 40 MG EC tablet  90 tablet 3 11/18/2022    [facility] [place], [place] - [address]    Sig: Take 1 tablet (40 mg) by mouth every morning (before breakfast)    Class: E-Prescribe    Route: Oral    polyethylene glycol (MIRALAX) 17 GM/Dose powder  510 g  11/10/2022        Sig: Take 17 g by mouth daily    Class: Transitional    Route: Oral    simethicone (MYLICON) 80 MG chewable tablet  120 tablet 0 11/17/2022 12/17/2022   [facility] [place], [place] - [address]    Sig: Take 1 tablet (80 mg) by mouth every 6 hours as needed for cramping    Class: E-Prescribe    Route: Oral    sitagliptin  (JANUVIA) 100 MG tablet  30 tablet 0 11/17/2022    [facility] [place] - [address]    Sig: Take 1 tablet (100 mg) by mouth daily    Class: E-Prescribe    Route: Oral    tacrolimus (GENERIC EQUIVALENT) 1 MG capsule  360 capsule 3 11/18/2022    [facility] - [place], [place] - [address]    Sig: Take 6 capsules (6 mg) by mouth two times daily    Class: E-Prescribe    Notes to Pharmacy: TXP DT 11/2/2022 (Liver) TXP Dischg DT 11/10/2022 DX Liver replaced by transplant Z94.4 TX Center [facility] ([place], [place])    Route: Oral    topiramate (TOPAMAX) 50 MG tablet    10/27/2021        Sig: Take 50 mg by mouth daily     Class: Historical    Route: Oral    traZODone (DESYREL) 100 MG tablet    2/21/2022    [facility] [place], [place] - [address]    Sig: Take 150 mg by mouth At Bedtime    Class: Historical    Route: Oral    valGANciclovir (VALCYTE) 450 MG tablet  90 tablet 3 11/18/2022    [facility] - [place], [place] - [address]    Sig: Take 1 tablet (450 mg) by mouth daily    Class: E-Prescribe    Route: Oral        Methylprednisolone, Oxycodone, Droperidol, Nsaids, and Prednisone   REVIEW OF SYSTEMS (check box if normal)  [x]               GENERAL  [x]                 PULMONARY [x]                GENITOURINARY  [x]                CNS                 [x]                 CARDIAC  [x]                 ENDOCRINE  [x]                EARS,NOSE,THROAT [x]                 GASTROINTESTINAL [x]                 NEUROLOGIC    [x]                MUSCLOSKELTAL  [x]                  HEMATOLOGY      PHYSICAL EXAM (check box if normal)/77   Pulse 77   Wt 80.3 kg (177 lb)   SpO2 97%   BMI 28.58 kg/m          [x]            GENERAL:    [x]       EYES:  ICTERIC   []        YES  []                    NO  [x]           EXTREMITIES: Clubbing []       Y     [x]           N    [x]            EARS, NOSE, THROAT: Membranes Moist    YES   [x]                   NO []                  [x]           LUNGS:  CLEAR    YES       [x]                  NO    []                                [x]           SKIN: Jaundice           YES       []                  NO    [x]                   Rash: YES       []                  NO    [x]                                     [x]             HEART: Regular Rate          YES       [x]                  NO    []                   Incision Clean:  YES       [x]                  NO    []                                [x]                    ABDOMEN: Organomegaly YES       []                  NO    [x]                       [x]                    NEUROLOGICAL:  Nonfocal  YES       [x]                  NO    []                       [x]                    Hernia YES       []                  NO    [x]                   PSYCHIATRIC:  Appropriate  YES       [x]                  NO    []                       OTHER:                                                                                                   PAIN SCALE:: 3

## 2022-11-29 ENCOUNTER — TELEPHONE (OUTPATIENT)
Dept: TRANSPLANT | Facility: CLINIC | Age: 52
End: 2022-11-29

## 2022-11-29 NOTE — TELEPHONE ENCOUNTER
"Collaborated with: Liver Transplant Team    Data: Susan is s/p  donor Liver Transplant. Sw called Susan for a routine follow up. She is 27 days post liver transplant.  Intervention/Education: I spoke with Susan via phone and we reviewed the followin. Writing to the Donor Family process- Susan reports she has been in contact with her donor family.         2. Liver Transplant Support Group and social events. Susan has been attending the liver support group and spoke highly of the time she has spent in these meetings. Susan reports she has met up with several members who live nearby her home. She reports plans to see a few of these individuals this Friday.         3. Immunosuppression copays-no current concerns. Susan reports she does not currently have any co-pays.  I reminded Susan to contact the transplant sw if he has future concerns and we can evaluate for financial assistance programs, grants, etc.         4. Adjustment to illness- coping approprietly given the situation. See below for additional information.         5. Importance of maintaining abstinence from all non-prescribed drugs and alcohol. Susan reports this is going well and she has \"no desire\" to engage in chemical use.   Assessment: Susan was intermittently tearful throughout the call and expressed much gratitude to the transplant team for their ongoing care and support. Susan reports she has been experiencing an increase in feelings of anxiety. It appears this has been exacerbated by her husbands recent bladder cancer surgery (which occurred the same week of her liver transplant) as well as ongoing physical symptoms that have been associated with her recovery (\"diarrhea, nausea, fatigue, edema, and nerve pain\"). Susan reports she has been in contact with her medical care providers and they are aware of her current concerns. Susan reports her primary care physician is adjusting her antidepressant medication and she plans to follow up with [facility]" for individual counseling. She continues to have a strong support system consisting of her mother, , children, friends, and support groups members. Susan appears to be leaning on her larisa as well. Susan had no additional questions or concerns and was thankful for the call.     Plan: Transplant sw to remain available for the psychosocial needs of this patient.    Lena Hodges, ITZEL  SOT/BMT/CF Float

## 2022-11-29 NOTE — LETTER
OUTPATIENT OR TRANSITIONAL CARE  LABORATORY TEST ORDER  [facility]  FAX: [phone]    Patient Name: Susan Puckett  Transplant Date: 11/2/2022   YOB: 1970  Issue Date: 11/29/22   [facility] MR#:[id number]  Expiration Date: 11/29/23       Diagnoses: [x]      Liver Transplant (ICD-10 Z94.4)    [x]      Long term use of medications (ICD-10 Z79.899)     Please fax results to [phone]    Frequency: Once        [x] C Difficile Toxin PCR   [x] Enteric Bacteria and Virus Panel by [facility] St[facility]      If you have questions, please call [phone] or [phone].          Delbert Morgan MD/PhD  Professor of Surgery  Chief, Division of Transplantation    Executive Medical Director  Solid Organ Transplant Service [facility]

## 2022-11-29 NOTE — TELEPHONE ENCOUNTER
Spoke to Susan. Since waking up from nap yesterday, c/o diarrhea. States she has about 4-5 episodes since last night, denies any blood in stool. Has been trying to keep up on her fluid intake. She has been eating but states she doesn't have much of an appetite. C/o nausea & migraine last night unrelieved by Maxalt. Zofran available PRN.     Instructed patient to eat a bland diet, continue to stay hydrated. Stool cultures faxed to [facility] [place] location, she will try to give sample today. Instructed to return call if diarrhea continues into tomorrow, or unable to keep medications down. If stool samples negative discussed using Metamucil PRN. I did discuss changing MMF to Mycophenolic Acid if diarrhea continues. On low dose Mag 400 mg BID.

## 2022-11-30 ENCOUNTER — TELEPHONE (OUTPATIENT)
Dept: TRANSPLANT | Facility: CLINIC | Age: 52
End: 2022-11-30

## 2022-11-30 DIAGNOSIS — R19.7 DIARRHEA: Primary | ICD-10-CM

## 2022-11-30 NOTE — TELEPHONE ENCOUNTER
"Spoke to Susan. She reports the tape has fallen off her incision and she noticed a small scab with what looks like an \"opening\". Patient denies redness/drainage or fevers. Denies bleeding from incision site. Instructed patient to leave area alone, can put dry gauze or nonstick dressing over area to keep covered. Reminded patient when showering to pat dry and not rub the incision site. Patient agrees and will watch area and let me know if worsens.     In addition, patient reports diarrhea has gotten better. She had 2-3 episodes today. She reports she was unable to  stool kit from , requesting stool cultures to be ordered through  and will  at lab draw tomorrow. Stool studies ordered.     Patient reports her headaches have not gotten any better. She reports taking Tylenol & Maxalt PRN without relief. She has been staying hydrated. I let patient know that depending on what her Tac level is tomorrow, will try to decrease if able which may help with headaches.       "

## 2022-12-01 ENCOUNTER — LAB (OUTPATIENT)
Dept: LAB | Facility: CLINIC | Age: 52
End: 2022-12-01
Payer: COMMERCIAL

## 2022-12-01 DIAGNOSIS — Z94.4 LIVER REPLACED BY TRANSPLANT (H): Primary | ICD-10-CM

## 2022-12-01 DIAGNOSIS — R19.7 DIARRHEA: ICD-10-CM

## 2022-12-01 DIAGNOSIS — E83.42 HYPOMAGNESEMIA: ICD-10-CM

## 2022-12-01 DIAGNOSIS — K86.89 PANCREATIC INSUFFICIENCY: ICD-10-CM

## 2022-12-01 DIAGNOSIS — Z94.4 LIVER REPLACED BY TRANSPLANT (H): ICD-10-CM

## 2022-12-01 LAB
ALBUMIN SERPL-MCNC: 2.8 G/DL (ref 3.4–5)
ALP SERPL-CCNC: 113 U/L (ref 40–150)
ALT SERPL W P-5'-P-CCNC: 14 U/L (ref 0–50)
ANION GAP SERPL CALCULATED.3IONS-SCNC: 5 MMOL/L (ref 3–14)
AST SERPL W P-5'-P-CCNC: 18 U/L (ref 0–45)
BILIRUB DIRECT SERPL-MCNC: 0.2 MG/DL (ref 0–0.2)
BILIRUB SERPL-MCNC: 0.4 MG/DL (ref 0.2–1.3)
BUN SERPL-MCNC: 15 MG/DL (ref 7–30)
C DIFF TOX B STL QL: NEGATIVE
CALCIUM SERPL-MCNC: 8.6 MG/DL (ref 8.5–10.1)
CHLORIDE BLD-SCNC: 108 MMOL/L (ref 94–109)
CO2 SERPL-SCNC: 24 MMOL/L (ref 20–32)
CREAT SERPL-MCNC: 1.28 MG/DL (ref 0.52–1.04)
ERYTHROCYTE [DISTWIDTH] IN BLOOD BY AUTOMATED COUNT: 15.5 % (ref 10–15)
GFR SERPL CREATININE-BSD FRML MDRD: 51 ML/MIN/1.73M2
GLUCOSE BLD-MCNC: 188 MG/DL (ref 70–99)
HCT VFR BLD AUTO: 28.9 % (ref 35–47)
HGB BLD-MCNC: 9.2 G/DL (ref 11.7–15.7)
HOLD SPECIMEN: NORMAL
MAGNESIUM SERPL-MCNC: 1.4 MG/DL (ref 1.6–2.3)
MCH RBC QN AUTO: 31 PG (ref 26.5–33)
MCHC RBC AUTO-ENTMCNC: 31.8 G/DL (ref 31.5–36.5)
MCV RBC AUTO: 97 FL (ref 78–100)
PHOSPHATE SERPL-MCNC: 4.5 MG/DL (ref 2.5–4.5)
PLATELET # BLD AUTO: 195 10E3/UL (ref 150–450)
POTASSIUM BLD-SCNC: 4.6 MMOL/L (ref 3.4–5.3)
PROT SERPL-MCNC: 6.4 G/DL (ref 6.8–8.8)
RBC # BLD AUTO: 2.97 10E6/UL (ref 3.8–5.2)
SODIUM SERPL-SCNC: 137 MMOL/L (ref 133–144)
T4 FREE SERPL-MCNC: 1.3 NG/DL (ref 0.76–1.46)
TACROLIMUS BLD-MCNC: 9.1 UG/L (ref 5–15)
TME LAST DOSE: NORMAL H
TME LAST DOSE: NORMAL H
TSH SERPL DL<=0.005 MIU/L-ACNC: 0.29 MU/L (ref 0.4–4)
WBC # BLD AUTO: 3.1 10E3/UL (ref 4–11)

## 2022-12-01 PROCEDURE — 84100 ASSAY OF PHOSPHORUS: CPT

## 2022-12-01 PROCEDURE — 80197 ASSAY OF TACROLIMUS: CPT

## 2022-12-01 PROCEDURE — 82248 BILIRUBIN DIRECT: CPT

## 2022-12-01 PROCEDURE — 84439 ASSAY OF FREE THYROXINE: CPT

## 2022-12-01 PROCEDURE — 87493 C DIFF AMPLIFIED PROBE: CPT

## 2022-12-01 PROCEDURE — 83735 ASSAY OF MAGNESIUM: CPT

## 2022-12-01 PROCEDURE — 87506 IADNA-DNA/RNA PROBE TQ 6-11: CPT

## 2022-12-01 PROCEDURE — 36415 COLL VENOUS BLD VENIPUNCTURE: CPT

## 2022-12-01 PROCEDURE — 82653 EL-1 FECAL QUANTITATIVE: CPT

## 2022-12-01 PROCEDURE — 80053 COMPREHEN METABOLIC PANEL: CPT

## 2022-12-01 PROCEDURE — 85027 COMPLETE CBC AUTOMATED: CPT

## 2022-12-01 PROCEDURE — 84443 ASSAY THYROID STIM HORMONE: CPT

## 2022-12-01 RX ORDER — MAGNESIUM OXIDE 400 MG/1
400 TABLET ORAL 3 TIMES DAILY
Qty: 90 TABLET | Refills: 11 | Status: SHIPPED | OUTPATIENT
Start: 2022-12-01 | End: 2022-12-13

## 2022-12-01 NOTE — PROGRESS NOTES
Post Liver Transplant Team Conference  Date: 12/1/2022  Transplant Coordinator: Edwina Fallon  Transplant Surgeon: Delbert Morgan      Attendees:  [] Dr. Tejada [] Dr. Burgess []       [x] Dr. Alcaraz [x] Ariana Liao LPN []    [] Dr. Gutierrez [] Shelby Vicente NP []    [] Dr. Benitez [] Edwina Logan NP []    [] Dr. Farias [x] Edwina Fallon, AJAY []       [] Dr. Cook [x] Brandi Nichols, AJAY []       [] Dr. Doroteo Bear [x] Hazel Jean, AJAY []       [] Dr. MARY ANN Morgan [x] Yenni Moe RN []       [] Dr. Thrasher [] Kathy Munoz RN []       [] Dr. Gooden [] Chema Briseno, AJAY []         Verbal Plan Read Back:   Check TSH with next labs, increase Mag to 3x daily, ensure patient is f/u with Endocrinology.    Routed to RN Coordinator   Edwina Fallon, RN

## 2022-12-02 LAB
C COLI+JEJUNI+LARI FUSA STL QL NAA+PROBE: NOT DETECTED
EC STX1 GENE STL QL NAA+PROBE: NOT DETECTED
EC STX2 GENE STL QL NAA+PROBE: NOT DETECTED
ELASTASE PANC STL-MCNT: 0.5 UG/G
NOROV GI+II ORF1-ORF2 JNC STL QL NAA+PR: NOT DETECTED
RVA NSP5 STL QL NAA+PROBE: NOT DETECTED
SALMONELLA SP RPOD STL QL NAA+PROBE: NOT DETECTED
SHIGELLA SP+EIEC IPAH STL QL NAA+PROBE: NOT DETECTED
V CHOL+PARA RFBL+TRKH+TNAA STL QL NAA+PR: NOT DETECTED
Y ENTERO RECN STL QL NAA+PROBE: NOT DETECTED

## 2022-12-02 RX ORDER — LORAZEPAM 0.5 MG/1
0.5 TABLET ORAL
Status: ON HOLD | COMMUNITY
Start: 2022-11-28 | End: 2024-03-04

## 2022-12-02 NOTE — PROGRESS NOTES
"Patient sent MyC message stating:    \"I still have been having these Headaches/ migraines. I have been using a lavender heat for it too... I don't heat long. I close my eyes with washcloth  massage my temples  head nothing works. I take the medication separately from all other meds and within 20 min. ready to throw up. this is from both migraine meds. not sure if u want to share with Davi   it really is very painful    I'm drinking water\"    Instructed patient to speak with PCP and let her know that Maxalt is not helping. Discussed Tac level being therapeutic at this time and cannot lower dose. Forwarded message to Davi Clayton for further review and ideas on migraine medication.    In addition, patient reports her PCP added 0.5 mg Ativan at bedtime PRN for anxiety. Medication reconciled and added into patient's chart.     In addition, C diff and Enteric Panel negative. Informed patient she can take Metamucil PRN for diarrhea.  "

## 2022-12-04 ENCOUNTER — APPOINTMENT (OUTPATIENT)
Dept: ULTRASOUND IMAGING | Facility: CLINIC | Age: 52
End: 2022-12-04
Attending: NURSE PRACTITIONER
Payer: COMMERCIAL

## 2022-12-04 ENCOUNTER — TELEPHONE (OUTPATIENT)
Dept: TRANSPLANT | Facility: CLINIC | Age: 52
End: 2022-12-04

## 2022-12-04 ENCOUNTER — APPOINTMENT (OUTPATIENT)
Dept: CT IMAGING | Facility: CLINIC | Age: 52
End: 2022-12-04
Attending: EMERGENCY MEDICINE
Payer: COMMERCIAL

## 2022-12-04 ENCOUNTER — HOSPITAL ENCOUNTER (OUTPATIENT)
Facility: CLINIC | Age: 52
Setting detail: OBSERVATION
Discharge: HOME | End: 2022-12-06
Attending: EMERGENCY MEDICINE | Admitting: TRANSPLANT SURGERY
Payer: COMMERCIAL

## 2022-12-04 DIAGNOSIS — Z94.4 LIVER REPLACED BY TRANSPLANT (H): ICD-10-CM

## 2022-12-04 DIAGNOSIS — G44.89 OTHER HEADACHE SYNDROME: Primary | ICD-10-CM

## 2022-12-04 DIAGNOSIS — Z94.4 LIVER TRANSPLANTED (H): ICD-10-CM

## 2022-12-04 DIAGNOSIS — R11.10 VOMITING AND DIARRHEA: ICD-10-CM

## 2022-12-04 DIAGNOSIS — Z94.4 STATUS POST LIVER TRANSPLANTATION (H): ICD-10-CM

## 2022-12-04 DIAGNOSIS — Z98.84 BARIATRIC SURGERY STATUS: ICD-10-CM

## 2022-12-04 DIAGNOSIS — E16.2 SEIZURE DUE TO HYPOGLYCEMIA (H): ICD-10-CM

## 2022-12-04 DIAGNOSIS — R19.7 VOMITING AND DIARRHEA: ICD-10-CM

## 2022-12-04 DIAGNOSIS — E11.9 TYPE 2 DIABETES MELLITUS WITHOUT COMPLICATION, WITHOUT LONG-TERM CURRENT USE OF INSULIN (H): ICD-10-CM

## 2022-12-04 DIAGNOSIS — R10.84 GENERALIZED ABDOMINAL PAIN: ICD-10-CM

## 2022-12-04 DIAGNOSIS — R56.9 SEIZURE DUE TO HYPOGLYCEMIA (H): ICD-10-CM

## 2022-12-04 DIAGNOSIS — R10.84 ABDOMINAL PAIN, GENERALIZED: ICD-10-CM

## 2022-12-04 DIAGNOSIS — Z20.822 CONTACT WITH AND (SUSPECTED) EXPOSURE TO COVID-19: ICD-10-CM

## 2022-12-04 LAB
ALBUMIN SERPL BCG-MCNC: 3.6 G/DL (ref 3.5–5.2)
ALP SERPL-CCNC: 179 U/L (ref 35–104)
ALT SERPL W P-5'-P-CCNC: 11 U/L (ref 10–35)
ANION GAP SERPL CALCULATED.3IONS-SCNC: 12 MMOL/L (ref 7–15)
AST SERPL W P-5'-P-CCNC: 31 U/L (ref 10–35)
BASOPHILS # BLD AUTO: 0.1 10E3/UL (ref 0–0.2)
BASOPHILS NFR BLD AUTO: 1 %
BILIRUB SERPL-MCNC: 0.4 MG/DL
BUN SERPL-MCNC: 16.3 MG/DL (ref 6–20)
CALCIUM SERPL-MCNC: 9 MG/DL (ref 8.6–10)
CHLORIDE SERPL-SCNC: 107 MMOL/L (ref 98–107)
CREAT SERPL-MCNC: 1.25 MG/DL (ref 0.51–0.95)
DEPRECATED HCO3 PLAS-SCNC: 20 MMOL/L (ref 22–29)
EOSINOPHIL # BLD AUTO: 0.2 10E3/UL (ref 0–0.7)
EOSINOPHIL NFR BLD AUTO: 5 %
ERYTHROCYTE [DISTWIDTH] IN BLOOD BY AUTOMATED COUNT: 15.7 % (ref 10–15)
FLUAV RNA SPEC QL NAA+PROBE: NEGATIVE
FLUBV RNA RESP QL NAA+PROBE: NEGATIVE
GFR SERPL CREATININE-BSD FRML MDRD: 52 ML/MIN/1.73M2
GGT SERPL-CCNC: 82 U/L (ref 5–36)
GLUCOSE BLDC GLUCOMTR-MCNC: 169 MG/DL (ref 70–99)
GLUCOSE BLDC GLUCOMTR-MCNC: 257 MG/DL (ref 70–99)
GLUCOSE SERPL-MCNC: 120 MG/DL (ref 70–99)
HCT VFR BLD AUTO: 30.8 % (ref 35–47)
HGB BLD-MCNC: 9.4 G/DL (ref 11.7–15.7)
IMM GRANULOCYTES # BLD: 0 10E3/UL
IMM GRANULOCYTES NFR BLD: 0 %
LACTATE SERPL-SCNC: 0.7 MMOL/L (ref 0.7–2)
LIPASE SERPL-CCNC: 7 U/L (ref 13–60)
LYMPHOCYTES # BLD AUTO: 0.5 10E3/UL (ref 0.8–5.3)
LYMPHOCYTES NFR BLD AUTO: 15 %
MCH RBC QN AUTO: 30 PG (ref 26.5–33)
MCHC RBC AUTO-ENTMCNC: 30.5 G/DL (ref 31.5–36.5)
MCV RBC AUTO: 98 FL (ref 78–100)
MONOCYTES # BLD AUTO: 0.2 10E3/UL (ref 0–1.3)
MONOCYTES NFR BLD AUTO: 5 %
NEUTROPHILS # BLD AUTO: 2.6 10E3/UL (ref 1.6–8.3)
NEUTROPHILS NFR BLD AUTO: 74 %
NRBC # BLD AUTO: 0 10E3/UL
NRBC BLD AUTO-RTO: 0 /100
PLATELET # BLD AUTO: 200 10E3/UL (ref 150–450)
POTASSIUM SERPL-SCNC: 5.3 MMOL/L (ref 3.4–5.3)
PROT SERPL-MCNC: 6.6 G/DL (ref 6.4–8.3)
RBC # BLD AUTO: 3.13 10E6/UL (ref 3.8–5.2)
RSV RNA SPEC NAA+PROBE: NEGATIVE
SARS-COV-2 RNA RESP QL NAA+PROBE: NEGATIVE
SODIUM SERPL-SCNC: 139 MMOL/L (ref 136–145)
TROPONIN T SERPL HS-MCNC: 18 NG/L
TROPONIN T SERPL HS-MCNC: 21 NG/L
WBC # BLD AUTO: 3.6 10E3/UL (ref 4–11)

## 2022-12-04 PROCEDURE — 85025 COMPLETE CBC W/AUTO DIFF WBC: CPT | Performed by: EMERGENCY MEDICINE

## 2022-12-04 PROCEDURE — 82977 ASSAY OF GGT: CPT | Performed by: NURSE PRACTITIONER

## 2022-12-04 PROCEDURE — 87799 DETECT AGENT NOS DNA QUANT: CPT | Performed by: NURSE PRACTITIONER

## 2022-12-04 PROCEDURE — 120N000011 HC R&B TRANSPLANT UMMC

## 2022-12-04 PROCEDURE — 99285 EMERGENCY DEPT VISIT HI MDM: CPT | Mod: 25 | Performed by: EMERGENCY MEDICINE

## 2022-12-04 PROCEDURE — 258N000003 HC RX IP 258 OP 636: Performed by: EMERGENCY MEDICINE

## 2022-12-04 PROCEDURE — 96374 THER/PROPH/DIAG INJ IV PUSH: CPT | Mod: 59 | Performed by: EMERGENCY MEDICINE

## 2022-12-04 PROCEDURE — C9803 HOPD COVID-19 SPEC COLLECT: HCPCS | Performed by: EMERGENCY MEDICINE

## 2022-12-04 PROCEDURE — 96375 TX/PRO/DX INJ NEW DRUG ADDON: CPT | Performed by: EMERGENCY MEDICINE

## 2022-12-04 PROCEDURE — 93010 ELECTROCARDIOGRAM REPORT: CPT | Performed by: EMERGENCY MEDICINE

## 2022-12-04 PROCEDURE — 74177 CT ABD & PELVIS W/CONTRAST: CPT | Mod: 26 | Performed by: RADIOLOGY

## 2022-12-04 PROCEDURE — 250N000011 HC RX IP 250 OP 636: Performed by: EMERGENCY MEDICINE

## 2022-12-04 PROCEDURE — 93975 VASCULAR STUDY: CPT

## 2022-12-04 PROCEDURE — 250N000009 HC RX 250: Performed by: EMERGENCY MEDICINE

## 2022-12-04 PROCEDURE — 99218 PR INITIAL OBSERVATION CARE,LEVEL I: CPT | Mod: 24 | Performed by: TRANSPLANT SURGERY

## 2022-12-04 PROCEDURE — 999N000248 HC STATISTIC IV INSERT WITH US BY RN

## 2022-12-04 PROCEDURE — 83690 ASSAY OF LIPASE: CPT | Performed by: EMERGENCY MEDICINE

## 2022-12-04 PROCEDURE — 250N000012 HC RX MED GY IP 250 OP 636 PS 637: Performed by: NURSE PRACTITIONER

## 2022-12-04 PROCEDURE — 999N000285 HC STATISTIC VASC ACCESS LAB DRAW WITH PIV START

## 2022-12-04 PROCEDURE — 93005 ELECTROCARDIOGRAM TRACING: CPT | Performed by: EMERGENCY MEDICINE

## 2022-12-04 PROCEDURE — 87637 SARSCOV2&INF A&B&RSV AMP PRB: CPT | Performed by: EMERGENCY MEDICINE

## 2022-12-04 PROCEDURE — 74177 CT ABD & PELVIS W/CONTRAST: CPT

## 2022-12-04 PROCEDURE — 80053 COMPREHEN METABOLIC PANEL: CPT | Performed by: EMERGENCY MEDICINE

## 2022-12-04 PROCEDURE — 96361 HYDRATE IV INFUSION ADD-ON: CPT | Performed by: EMERGENCY MEDICINE

## 2022-12-04 PROCEDURE — 36415 COLL VENOUS BLD VENIPUNCTURE: CPT | Performed by: EMERGENCY MEDICINE

## 2022-12-04 PROCEDURE — 36415 COLL VENOUS BLD VENIPUNCTURE: CPT | Performed by: NURSE PRACTITIONER

## 2022-12-04 PROCEDURE — 83605 ASSAY OF LACTIC ACID: CPT | Performed by: EMERGENCY MEDICINE

## 2022-12-04 PROCEDURE — 84484 ASSAY OF TROPONIN QUANT: CPT | Performed by: EMERGENCY MEDICINE

## 2022-12-04 PROCEDURE — 250N000013 HC RX MED GY IP 250 OP 250 PS 637: Performed by: PHYSICIAN ASSISTANT

## 2022-12-04 PROCEDURE — 96372 THER/PROPH/DIAG INJ SC/IM: CPT | Performed by: NURSE PRACTITIONER

## 2022-12-04 PROCEDURE — 250N000013 HC RX MED GY IP 250 OP 250 PS 637: Performed by: NURSE PRACTITIONER

## 2022-12-04 PROCEDURE — 93975 VASCULAR STUDY: CPT | Mod: 26 | Performed by: RADIOLOGY

## 2022-12-04 RX ORDER — LIDOCAINE 40 MG/G
CREAM TOPICAL
Status: DISCONTINUED | OUTPATIENT
Start: 2022-12-04 | End: 2022-12-06 | Stop reason: HOSPADM

## 2022-12-04 RX ORDER — MAGNESIUM SULFATE HEPTAHYDRATE 40 MG/ML
2 INJECTION, SOLUTION INTRAVENOUS ONCE
Status: COMPLETED | OUTPATIENT
Start: 2022-12-05 | End: 2022-12-05

## 2022-12-04 RX ORDER — HYDROMORPHONE HYDROCHLORIDE 1 MG/ML
0.5 INJECTION, SOLUTION INTRAMUSCULAR; INTRAVENOUS; SUBCUTANEOUS
Status: COMPLETED | OUTPATIENT
Start: 2022-12-04 | End: 2022-12-04

## 2022-12-04 RX ORDER — NICOTINE POLACRILEX 4 MG
15-30 LOZENGE BUCCAL
Status: DISCONTINUED | OUTPATIENT
Start: 2022-12-04 | End: 2022-12-06 | Stop reason: HOSPADM

## 2022-12-04 RX ORDER — LEVOTHYROXINE SODIUM 75 UG/1
150 TABLET ORAL DAILY
Status: DISCONTINUED | OUTPATIENT
Start: 2022-12-04 | End: 2022-12-06 | Stop reason: HOSPADM

## 2022-12-04 RX ORDER — ONDANSETRON 2 MG/ML
4 INJECTION INTRAMUSCULAR; INTRAVENOUS
Status: COMPLETED | OUTPATIENT
Start: 2022-12-04 | End: 2022-12-04

## 2022-12-04 RX ORDER — SENNOSIDES 8.6 MG
8.6 TABLET ORAL 2 TIMES DAILY PRN
Status: DISCONTINUED | OUTPATIENT
Start: 2022-12-04 | End: 2022-12-06 | Stop reason: HOSPADM

## 2022-12-04 RX ORDER — GABAPENTIN 100 MG/1
200 CAPSULE ORAL 3 TIMES DAILY
Status: DISCONTINUED | OUTPATIENT
Start: 2022-12-04 | End: 2022-12-06 | Stop reason: HOSPADM

## 2022-12-04 RX ORDER — PANTOPRAZOLE SODIUM 40 MG/1
40 TABLET, DELAYED RELEASE ORAL
Status: DISCONTINUED | OUTPATIENT
Start: 2022-12-05 | End: 2022-12-06 | Stop reason: HOSPADM

## 2022-12-04 RX ORDER — HYDROXYZINE HYDROCHLORIDE 25 MG/1
25 TABLET, FILM COATED ORAL 3 TIMES DAILY PRN
Status: DISCONTINUED | OUTPATIENT
Start: 2022-12-04 | End: 2022-12-06 | Stop reason: HOSPADM

## 2022-12-04 RX ORDER — POLYETHYLENE GLYCOL 3350 17 G/17G
17 POWDER, FOR SOLUTION ORAL 2 TIMES DAILY
Status: DISCONTINUED | OUTPATIENT
Start: 2022-12-04 | End: 2022-12-06 | Stop reason: HOSPADM

## 2022-12-04 RX ORDER — ESCITALOPRAM OXALATE 10 MG/1
20 TABLET ORAL DAILY
Status: DISCONTINUED | OUTPATIENT
Start: 2022-12-04 | End: 2022-12-06 | Stop reason: HOSPADM

## 2022-12-04 RX ORDER — IOPAMIDOL 755 MG/ML
108 INJECTION, SOLUTION INTRAVASCULAR ONCE
Status: COMPLETED | OUTPATIENT
Start: 2022-12-04 | End: 2022-12-04

## 2022-12-04 RX ORDER — VALGANCICLOVIR 450 MG/1
450 TABLET, FILM COATED ORAL DAILY
Status: DISCONTINUED | OUTPATIENT
Start: 2022-12-04 | End: 2022-12-06 | Stop reason: HOSPADM

## 2022-12-04 RX ORDER — BISACODYL 10 MG
10 SUPPOSITORY, RECTAL RECTAL DAILY PRN
Status: DISCONTINUED | OUTPATIENT
Start: 2022-12-04 | End: 2022-12-06 | Stop reason: HOSPADM

## 2022-12-04 RX ORDER — FOLIC ACID 1 MG/1
1 TABLET ORAL EVERY MORNING
Status: DISCONTINUED | OUTPATIENT
Start: 2022-12-04 | End: 2022-12-06 | Stop reason: HOSPADM

## 2022-12-04 RX ORDER — MAGNESIUM OXIDE 400 MG/1
400 TABLET ORAL 3 TIMES DAILY
Status: DISCONTINUED | OUTPATIENT
Start: 2022-12-04 | End: 2022-12-06 | Stop reason: HOSPADM

## 2022-12-04 RX ORDER — TOPIRAMATE 50 MG/1
50 TABLET, FILM COATED ORAL DAILY
Status: ON HOLD | COMMUNITY
Start: 2022-12-01 | End: 2022-12-06

## 2022-12-04 RX ORDER — ONDANSETRON 4 MG/1
8 TABLET, ORALLY DISINTEGRATING ORAL EVERY 8 HOURS PRN
Status: DISCONTINUED | OUTPATIENT
Start: 2022-12-04 | End: 2022-12-05

## 2022-12-04 RX ORDER — ACETAMINOPHEN 325 MG/1
975 TABLET ORAL EVERY 8 HOURS
Status: DISCONTINUED | OUTPATIENT
Start: 2022-12-04 | End: 2022-12-06

## 2022-12-04 RX ORDER — LANOLIN ALCOHOL/MO/W.PET/CERES
1000 CREAM (GRAM) TOPICAL
Status: DISCONTINUED | OUTPATIENT
Start: 2022-12-07 | End: 2022-12-06 | Stop reason: HOSPADM

## 2022-12-04 RX ORDER — RIZATRIPTAN BENZOATE 10 MG/1
10 TABLET ORAL
Status: COMPLETED | OUTPATIENT
Start: 2022-12-04 | End: 2022-12-05

## 2022-12-04 RX ORDER — DEXTROSE MONOHYDRATE 25 G/50ML
25-50 INJECTION, SOLUTION INTRAVENOUS
Status: DISCONTINUED | OUTPATIENT
Start: 2022-12-04 | End: 2022-12-06 | Stop reason: HOSPADM

## 2022-12-04 RX ORDER — MYCOPHENOLATE MOFETIL 250 MG/1
750 CAPSULE ORAL
Status: DISCONTINUED | OUTPATIENT
Start: 2022-12-04 | End: 2022-12-06 | Stop reason: HOSPADM

## 2022-12-04 RX ORDER — ASPIRIN 81 MG/1
81 TABLET, CHEWABLE ORAL DAILY
Status: DISCONTINUED | OUTPATIENT
Start: 2022-12-04 | End: 2022-12-06 | Stop reason: HOSPADM

## 2022-12-04 RX ORDER — DROPERIDOL 2.5 MG/ML
2.5 INJECTION, SOLUTION INTRAMUSCULAR; INTRAVENOUS ONCE
Status: COMPLETED | OUTPATIENT
Start: 2022-12-04 | End: 2022-12-04

## 2022-12-04 RX ORDER — TRAZODONE HYDROCHLORIDE 50 MG/1
50 TABLET, FILM COATED ORAL AT BEDTIME
Status: DISCONTINUED | OUTPATIENT
Start: 2022-12-04 | End: 2022-12-06 | Stop reason: HOSPADM

## 2022-12-04 RX ORDER — LORAZEPAM 0.5 MG/1
0.5 TABLET ORAL
Status: DISCONTINUED | OUTPATIENT
Start: 2022-12-04 | End: 2022-12-06 | Stop reason: HOSPADM

## 2022-12-04 RX ORDER — DAPSONE 25 MG/1
50 TABLET ORAL DAILY
Status: DISCONTINUED | OUTPATIENT
Start: 2022-12-04 | End: 2022-12-06 | Stop reason: HOSPADM

## 2022-12-04 RX ADMIN — MYCOPHENOLATE MOFETIL 750 MG: 250 CAPSULE ORAL at 12:15

## 2022-12-04 RX ADMIN — VALGANCICLOVIR HYDROCHLORIDE 450 MG: 450 TABLET ORAL at 12:20

## 2022-12-04 RX ADMIN — TACROLIMUS 6 MG: 5 CAPSULE ORAL at 12:20

## 2022-12-04 RX ADMIN — SITAGLIPTIN 100 MG: 100 TABLET, FILM COATED ORAL at 12:18

## 2022-12-04 RX ADMIN — FOLIC ACID 1 MG: 1 TABLET ORAL at 12:14

## 2022-12-04 RX ADMIN — DAPSONE 50 MG: 25 TABLET ORAL at 12:18

## 2022-12-04 RX ADMIN — TACROLIMUS 6 MG: 5 CAPSULE ORAL at 19:00

## 2022-12-04 RX ADMIN — MAGNESIUM OXIDE TAB 400 MG (241.3 MG ELEMENTAL MG) 400 MG: 400 (241.3 MG) TAB at 20:48

## 2022-12-04 RX ADMIN — DROPERIDOL 2.5 MG: 2.5 INJECTION, SOLUTION INTRAMUSCULAR; INTRAVENOUS at 04:56

## 2022-12-04 RX ADMIN — MYCOPHENOLATE MOFETIL 750 MG: 250 CAPSULE ORAL at 19:00

## 2022-12-04 RX ADMIN — ONDANSETRON 4 MG: 2 INJECTION INTRAMUSCULAR; INTRAVENOUS at 08:27

## 2022-12-04 RX ADMIN — SODIUM CHLORIDE, PRESERVATIVE FREE 77 ML: 5 INJECTION INTRAVENOUS at 08:04

## 2022-12-04 RX ADMIN — HYDROMORPHONE HYDROCHLORIDE 0.5 MG: 1 INJECTION, SOLUTION INTRAMUSCULAR; INTRAVENOUS; SUBCUTANEOUS at 08:27

## 2022-12-04 RX ADMIN — POLYETHYLENE GLYCOL 3350 17 G: 17 POWDER, FOR SOLUTION ORAL at 20:48

## 2022-12-04 RX ADMIN — MAGNESIUM OXIDE TAB 400 MG (241.3 MG ELEMENTAL MG) 400 MG: 400 (241.3 MG) TAB at 14:33

## 2022-12-04 RX ADMIN — SODIUM CHLORIDE 1000 ML: 9 INJECTION, SOLUTION INTRAVENOUS at 04:55

## 2022-12-04 RX ADMIN — GABAPENTIN 200 MG: 100 CAPSULE ORAL at 20:47

## 2022-12-04 RX ADMIN — LEVOTHYROXINE SODIUM 150 MCG: 0.07 TABLET ORAL at 12:17

## 2022-12-04 RX ADMIN — ASPIRIN 81 MG CHEWABLE TABLET 81 MG: 81 TABLET CHEWABLE at 12:17

## 2022-12-04 RX ADMIN — ACETAMINOPHEN 975 MG: 325 TABLET, FILM COATED ORAL at 19:00

## 2022-12-04 RX ADMIN — ESCITALOPRAM OXALATE 20 MG: 10 TABLET ORAL at 12:17

## 2022-12-04 RX ADMIN — INSULIN ASPART 1.5 UNITS: 100 INJECTION, SOLUTION INTRAVENOUS; SUBCUTANEOUS at 21:08

## 2022-12-04 RX ADMIN — ACETAMINOPHEN 975 MG: 325 TABLET, FILM COATED ORAL at 12:14

## 2022-12-04 RX ADMIN — IOPAMIDOL 108 ML: 755 INJECTION, SOLUTION INTRAVENOUS at 08:04

## 2022-12-04 RX ADMIN — PANCRELIPASE 2 CAPSULE: 24000; 76000; 120000 CAPSULE, DELAYED RELEASE PELLETS ORAL at 19:02

## 2022-12-04 RX ADMIN — GABAPENTIN 200 MG: 100 CAPSULE ORAL at 14:33

## 2022-12-04 ASSESSMENT — ACTIVITIES OF DAILY LIVING (ADL)
ADLS_ACUITY_SCORE: 41
ADLS_ACUITY_SCORE: 37
ADLS_ACUITY_SCORE: 41
ADLS_ACUITY_SCORE: 41
ADLS_ACUITY_SCORE: 39
ADLS_ACUITY_SCORE: 41
ADLS_ACUITY_SCORE: 39
WEAR_GLASSES_OR_BLIND: YES
ADLS_ACUITY_SCORE: 37
VISION_MANAGEMENT: GLASSES

## 2022-12-04 NOTE — PROGRESS NOTES
Very needy all of day.  Multiple requests for in patient hospital room.  Multiple requests for anti anxiety medications.  Multiple requests to have telemetry equipment etc removed.  Denies pain or headache throughout day.  Tylenol scheduled given.  VSS.  Report ultimately called to unit 7A.  Transferred to unit 7A with patient transport.

## 2022-12-04 NOTE — TELEPHONE ENCOUNTER
Rivera, , called to report that patient having a rough night.Struggling with migraines, nausea and diarrhea.    Pt spoke with pharmacist, took maxalt at 10pm and then started having nausea and vomiting and diarrhea. Took simethicone. And then repeated zofran and still struggling with dry heaving and vomiting at times.     Pt reports that there blood sugar is low. Blood sugar 135 after eating banana. Pt is stating that her abdominal cramping is bad at times. Discussed the n/v/d can be from the cellcept and nausea from migraine and can discuss in the AM with MD about switching to myfortic and seeing availability with [facility] for IV fluids. Or if pain is unbearable patient can go to the ER.     Pt's  prefers for her to stay home and try and do small sips of fluid and try and see if can get IV fluids in the Am at clinic and see if can change meds. Susan is very tearful and wants to go the ER.   Patient and family aware will touch base with them in the AM and if they did not go to the ER will look at option for iv fluids.

## 2022-12-04 NOTE — CONSULTS
[facility]  Neurology Consultation    Patient Name:  Susan Puckett  MRN:  [id number]    :  1970  Date of Service:  2022  Primary care provider:  Harleen Billy      Neurology consultation service was asked to see Susan Puckett by Dr. Morgan to evaluate headache/right thigh numbness    Chief Complaint: Headache    History of Present Illness:   Susan Puckett is a 52 year old female with history of chronic bilateral ptosis (negative myasthenia panel), migraine headaches (onset in ), liver transplant on 22 for CUETO cirrhosis, Faustino-en-Y gastric bypass in , DMII, depression, hypothyroidism, HTN, and a remote seizure who presented to the ED for abdominal pain, vomiting, and severe headache. Patient has a longstanding history of migraine headaches but felt her headaches worsened after liver transplant.     Of note, she had a seizure at age 15, and again last year. The first due to sleep deprivation and last year due to low blood sugar. She also reports her history of occasional migraine, for which she does not see a neurologist. Frequency of the migraine increased after her liver transplant. In February, sumatriptan was replaced with rizatriptan. She has not tried botulinum toxin for migraine. She has previously tried and failed topiramate. She takes a triptan several times last month as an abortive. She has only tried maxalt here in the hospital. She reported nausea with topiramate. migraines are described as bilateral frontal headaches that can radiate to the back of the head as well. During the last two weeks, she takes tylenol 325 mg 2-3 times per day, she has been doing this for the last few weeks. She has photo and phonophobia. Rates headache at 6-7/10. No diplopia, blurry vision, but does have N/V. The headache is essentially continuous. She denies positional headache.     As it relates to her thigh pain. She complains of numbness to the right  thigh in the distribution of the lateral femoral cutaneous nerve. She denies severe burning pain or weakness in the thigh. She does have some pain with touching that area. She is on gabapentin for this, and it has helped somewhat.       ROS  A comprehensive ROS was performed and pertinent findings were included in HPI.     PMH  Past Medical History:   Diagnosis Date     Anemia      Anxiety      Cold sore      Depressive disorder      Diabetes (H)     Type 2 DM/No Insulin      Encephalopathy      Esophageal varices without bleeding (H)     grade I     History of blood transfusion     10/2019     History of seizure     diabetic seizure     Insomnia      Liver cirrhosis secondary to CUETO (H) 2020     Migraine      Pleural effusion      Thrombocytopenia (H)      Thyroid disease      Past Surgical History:   Procedure Laterality Date     APPENDECTOMY           BENCH LIVER N/A 2022    Procedure: Bench liver;  Surgeon: Delbert Morgan MD;  Location: [facility] OR     COLONOSCOPY      2020 at [facility]      ENT SURGERY      tempanoplasty     GI SURGERY      EGD 2020 at [facility]      GYN SURGERY      laparoscopic ablation     IR TRANSVEN INTRAHEPATIC PORTOSYST SHUNT  2021     MAMMOPLASTY REDUCTION BILATERAL       [place] STAB PHELBECTOMY VARICOSE VEINS, LESS THAN 10 INCISIONS, ONE EXTREMITY       RNY gastric bypass  2006    retoocolic, retrogastric, [facility]     TONSILLECTOMY & ADENOIDECTOMY Bilateral      TRANSPLANT LIVER RECIPIENT  DONOR N/A 2022    Procedure: TRANSPLANT, LIVER, RECIPIENT,  DONOR;  Surgeon: Delbert Morgan MD;  Location: [facility] OR     WISDOM TEETH EXTRACTION         Medications   I have personally reviewed the patient's medication list.     Allergies  I have personally reviewed the patient's allergy list.     Social History  Denies tobacco, alcohol, and recreational drug use     Family History    FH of seizure in the mother      Physical  "Examination   Vitals: /82   Pulse 60   Temp 98.1  F (36.7  C) (Oral)   Resp 16   Ht 1.676 m (5' 6\")   Wt 80.3 kg (177 lb)   SpO2 98%   BMI 28.57 kg/m    General: Lying in bed, NAD  Head: NC/AT  Eyes: no icterus, op pink and moist  Cardiac: RRR. Extremities warm, no edema.   Respiratory: non-labored on RA  GI: S/NT/ND  Skin: No rash or lesion on exposed skin  Psych: Mood pleasant, affect congruent  Neuro:  Mental status: Awake, alert, attentive, oriented to self, time, place, and circumstance. Language is fluent and coherent with intact comprehension of complex commands, naming and repetition. Spells world, forwards and backwards (with difficulty).   Cranial nerves: VFF, PERRL, conjugate gaze, EOMI, facial sensation intact, face symmetric, shoulder shrug strong, tongue/uvula midline, no dysarthria.   Motor: Normal bulk and tone. No abnormal movements. 5/5 strength bilaterally in deltoids, biceps, triceps, hand , hip flexors, hip extensors, knee flexion, knee extension, plantarflexion, dorsiflexion.   Reflexes: Normo-reflexic (2+) and symmetric biceps, brachioradialis, patellae, and achilles. Down going toe on left, equivocal on right.   Sensory: intact to light touch and pin prick throughout, except for decreased to pin prick and light touch in the right lateral thigh.   Coordination: FNF and HS without ataxia or dysmetria. Rapid alternating movements intact.   Gait: Deferred     Investigations   I have personally reviewed pertinent labs, tests, and radiological imaging. Discussion of notable findings is included under Impression.     Was patient transferred from outside hospital?   No    Impression  52 year old female with history of chronic bilateral ptosis (negative myasthenia panel), migraine headaches (onset in 2018), liver transplant on 11/2/22 for CUETO cirrhosis, Faustino-en-Y gastric bypass in 2006, DMII, depression, hypothyroidism, HTN, and a remote seizure who presented to the ED for abdominal " pain, vomiting, and severe headache. Neurology consulted for headache and right thigh numbness.     #Headache   Immunosuppressant medications such as cyclosporine, tacrolimus, sirolimus, and OKT3 have been associated with development of headache syndromes in the transplant patient, exacerbation of previous headaches syndromes, and development of encephalopathies including headache as a clinical feature. To date, patient has received 975 mg Tylenol x 1, 1L bolus x 1, aspirin, IV droperidol 2.5 mg x 1, gabapentin 200 mg x 1, 0.5 mg IV Dilaudid x 1, 400 mg oral magnesium oxide x 1, 4 mg IV Zofran x 1. At this time, her description is not c/w migraine, but rather cervicalgia or headache related to medications as listed above.   -2gm IV mag tonight   -Migraine Cocktail to be considered in AM  -Physical therapy to address neck tightness  -Heat pack to the neck    #Right thigh numbness  Numbness and mild + sensory phenomenon in the right lateral femoral cutaneous nerve is most c/w meralgia paresthetica. This is a benign neuropathy associated with weight gain and tight fitting clothing. This can also be associated with positioning in the OR. It is expected to improve. Patient was counseled regarding prognosis, and likelihood that it will improve over the course of months.   -Continue gabapentin    Thank you for involving Neurology in the care of Susan Puckett.  Please do not hesitate to call with questions/concerns (consult pager [phone]).      Patient was discussed with Dr. Cruz.     Neel Haas MD  PGY-4 Neurology Resident   P: [phone]

## 2022-12-04 NOTE — H&P
Transplant Surgery  Inpatient Daily Progress Note  2022    Assessment & Plan: Susan Puckett is a 52yoF s/p DDLT on 22 for cirrhosis 2/2 CUETO who presents with nausea/vomiting and migraines.    Graft function:good, stable. AST/ALT T bili within normal. Alk phos elevated. CT w/ contrast with postoperative changes of liver vs ?hepatitis vs. ?acute rejection. Given graft function normal more likely related to post-operative changes, Will perform US and order GGT as well    Immunosuppression management: No change tacro 6 BID, cellcept 750 BID, Tacro levels have been within range.  Complexity of management:Low. Contributing factors: nausea     May need to consider tacro as cause to migraines/nausea if unimproved with Maxalt    Neuro: Will consult Neurology regarding right thigh numbness/migraines  Hematology: Hgb 9.4, no acute issues  Cardiorespiratory: Stable   GI/Nutrition: nausea/vomiting, aggressive bowel regimen given constipation on CT, regular diet  Endocrine: DM, continue januvia and home insulin, hypothyroid - continue synthroid  Fluid/Electrolytes: no IVF, Cr at baseline 1 - 1.2.  : no acute issues  Infectious disease: EBV, CMV, blood cultures to be drawn, WBC 3.6,   Prophylaxis: DVT, fall, continue dapsone/valcyte  Disposition: med/surge     Medical Decision Making: Low  Admit [id number] (straight-forward/low level decision making)    ANYA/Fellow/Resident Provider: Paulie Miranda MD     Faculty: Dr. Morgan    __________________________________________________________________  Transplant History: Admitted 2022 for nausea/vomiting and migraines.   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    2022 (Liver), Postoperative day: 32     Interval History: History is obtained from the patient  Patient reports that she has been having nightly migraines at home. She attempted to take sumatriptan to aid in her migraines. This medication had recently  within the  "month. She reports following taking the medication that she had associated nausea and vomiting. She additionally has had right leg burning pain and numbness in her thigh since surgery. She denies further symptoms at this time. She was previously admitted following transplant with complications of bilateral lower extremity edema.     ROS:   A 10-point review of systems was negative except as noted above.    Curent Meds:    acetaminophen  975 mg Oral Q8H     amylase-lipase-protease  2 capsule Oral TID w/meals     aspirin  81 mg Oral Daily     [START ON 12/7/2022] cyanocobalamin  1,000 mcg Oral Q7 Days     dapsone  50 mg Oral Daily     escitalopram  20 mg Oral Daily     folic acid  1 mg Oral QAM     gabapentin  200 mg Oral TID     insulin aspart   Subcutaneous BID 09 12     insulin aspart   Subcutaneous Daily with supper     [START ON 12/5/2022] insulin glargine  12 Units Subcutaneous QAM AC     levothyroxine  150 mcg Oral Daily     magnesium oxide  400 mg Oral TID     mycophenolate  750 mg Oral two times daily     [START ON 12/5/2022] pantoprazole  40 mg Oral QAM AC     polyethylene glycol  17 g Oral BID     sitagliptin  100 mg Oral Daily     sodium chloride (PF)  3 mL Intracatheter Q8H     tacrolimus  6 mg Oral two times daily     traZODone  50 mg Oral At Bedtime     valGANciclovir  450 mg Oral Daily       Physical Exam:     Admit Weight: 80.3 kg (177 lb)    Current Vitals:   /82   Pulse 60   Temp 98.1  F (36.7  C) (Oral)   Resp 16   Ht 1.676 m (5' 6\")   Wt 80.3 kg (177 lb)   SpO2 98%   BMI 28.57 kg/m           Vital sign ranges:    Temp:  [98.1  F (36.7  C)] 98.1  F (36.7  C)  Pulse:  [60-72] 60  Resp:  [16] 16  BP: (119-134)/(73-82) 134/82  SpO2:  [96 %-98 %] 98 %  Patient Vitals for the past 24 hrs:   BP Temp Temp src Pulse Resp SpO2 Height Weight   12/04/22 0700 134/82 -- -- 60 -- 98 % -- --   12/04/22 0600 119/78 -- -- 63 -- 97 % -- --   12/04/22 0514 120/82 -- -- 68 -- 96 % -- --   12/04/22 0343 " "132/73 98.1  F (36.7  C) Oral 72 16 97 % 1.676 m (5' 6\") 80.3 kg (177 lb)     General Appearance: in no apparent distress.   Skin: normal, No rashes, induration, or jaundice  Heart: regular rate  Lungs: nonlabored  Abdomen: soft, nontender, nondistended, incisino healing well  : n/a  Extremities: edema: present bilateral. 1+. There is no skin breakdown.  Neurologic: alert and oriented. Tremor absent..    Frailty Scores     Frailty Scores 1/6/2022 10/18/2020    Final Score Frail Not Frail    Final Score Number 3 2          Data:   CMP  Recent Labs   Lab 12/04/22 0448 12/01/22  0852 11/28/22  0851    137 138   POTASSIUM 5.3 4.6 4.6   CHLORIDE 107 108 104   CO2 20* 24 24   * 188* 322*   BUN 16.3 15 17.5   CR 1.25* 1.28* 1.29*   GFRESTIMATED 52* 51* 50*   MAURI 9.0 8.6 8.6   MAG  --  1.4* 1.3*   PHOS  --  4.5 4.7*   LIPASE 7*  --   --    ALBUMIN 3.6 2.8* 3.2*   BILITOTAL 0.4 0.4 0.4   ALKPHOS 179* 113 130*   AST 31 18 21   ALT 11 14 8*     CBC  Recent Labs   Lab 12/04/22 0448 12/01/22  0852   HGB 9.4* 9.2*   WBC 3.6* 3.1*    195     COAGSNo lab results found in last 7 days.    Invalid input(s): XA   Urinalysis  Recent Labs   Lab Test 11/19/22  0937 11/05/22  0928   COLOR Yellow Light Yellow   APPEARANCE Slightly Cloudy* Clear   URINEGLC Negative Negative   URINEBILI Negative Negative   URINEKETONE Negative 10*   SG 1.017 1.026   UBLD Negative Negative   URINEPH 5.5 5.5   PROTEIN 20* 20*   NITRITE Positive* Negative   LEUKEST Large* Negative   RBCU 3* 1   WBCU >182* 3     Virology:  Hepatitis C Antibody   Date Value Ref Range Status   11/01/2022 Nonreactive Nonreactive Final       "

## 2022-12-04 NOTE — ED NOTES
Patient had liver transplant a month ago. Patient developed N/V earlier last evening. Patient unable to migraine medicine and complains of head, neck and abdominal pain. Patient brought in by car with

## 2022-12-04 NOTE — TELEPHONE ENCOUNTER
Attempted to reach [facility] as planned. Received voicemail. Left message that it was noted she was in the ER and they have paged txp team and they are discussing a plan.

## 2022-12-04 NOTE — ED PROVIDER NOTES
History   No chief complaint on file.    HPI  Susan Puckett is a 52 year old female with PMH notable for cirrhosis s/p liver transplant 11/2/2022, Faustino-en-Y gastric bypass 2006, seizure who presents to the ED with abdominal pain and vomiting.  Patient reports that around 9:30 PM she took a sumatriptan for migraine.  She had not used that medication in quite some time, since before her liver transplant.  About 40 minutes after taking sumatriptan she began having vomiting.  She estimates about 3 episodes of vomiting with food output, 4-5 more episodes of dry heaving.  Patient also with about 5 episodes of small-volume diarrhea.  She reports diffuse abdominal pain which is constant with a sharp quality and worsened with the retching.  No fever.  Headache is improved.  Patient has been getting near daily headaches since her transplant.    Past Medical History  Past Medical History:   Diagnosis Date     Anemia      Anxiety      Cold sore      Depressive disorder      Diabetes (H)     Type 2 DM/No Insulin      Encephalopathy      Esophageal varices without bleeding (H)     grade I     History of blood transfusion     10/2019     History of seizure     diabetic seizure     Insomnia      Liver cirrhosis secondary to CUETO (H) 05/28/2020     Migraine      Pleural effusion      Thrombocytopenia (H)      Thyroid disease      Past Surgical History:   Procedure Laterality Date     APPENDECTOMY      1989     BENCH LIVER N/A 11/2/2022    Procedure: Bench liver;  Surgeon: Delbert Morgan MD;  Location: [facility] OR     COLONOSCOPY      1/2020 at [facility]      ENT SURGERY  1998    tempanoplasty     GI SURGERY      EGD August 2020 at [facility]      GYN SURGERY  2010    laparoscopic ablation     IR TRANSVEN INTRAHEPATIC PORTOSYST SHUNT  11/5/2021     MAMMOPLASTY REDUCTION BILATERAL  2004     [place] STAB PHELBECTOMY VARICOSE VEINS, LESS THAN 10 INCISIONS, ONE EXTREMITY  2020     RNY gastric bypass  01/2006    retoocolic, retrogastric, [facility]     TONSILLECTOMY & ADENOIDECTOMY Bilateral      TRANSPLANT LIVER RECIPIENT  DONOR N/A 2022    Procedure: TRANSPLANT, LIVER, RECIPIENT,  DONOR;  Surgeon: Delbert Morgan MD;  Location: [facility] OR     WISDOM TEETH EXTRACTION       acetaminophen (TYLENOL) 325 MG tablet  amylase-lipase-protease (CREON 24) 87427-38603 units CPEP per EC capsule  amylase-lipase-protease (CREON 24) 35308-59736 units CPEP per EC capsule  aspirin (ASA) 81 MG chewable tablet  bumetanide (BUMEX) 1 MG tablet  capsaicin 0.035 % CREA  Continuous Blood Gluc  (DEXCOM G6 ) LUNA  Continuous Blood Gluc Sensor (DEXCOM G6 SENSOR) MISC  Continuous Blood Gluc Transmit (DEXCOM G6 TRANSMITTER) MISC  cyanocobalamin (VITAMIN B-12) 1000 MCG tablet  dapsone (ACZONE) 25 MG tablet  escitalopram (LEXAPRO) 20 MG tablet  folic acid (FOLVITE) 1 MG tablet  gabapentin (NEURONTIN) 100 MG capsule  insulin aspart (NOVOLOG VIAL) 100 UNITS/ML vial  insulin glargine (LANTUS PEN) 100 UNIT/ML pen  insulin pen needle (BD GRISEL U/F) 32G X 4 MM miscellaneous  levothyroxine (SYNTHROID/LEVOTHROID) 150 MCG tablet  LORazepam (ATIVAN) 0.5 MG tablet  magnesium oxide (MAG-OX) 400 MG tablet  melatonin 5 MG tablet  methocarbamol (ROBAXIN) 500 MG tablet  multivitamin CF FORMULA (DEKAS PLUS) capsule  mycophenolate (GENERIC EQUIVALENT) 250 MG capsule  ondansetron (ZOFRAN ODT) 8 MG ODT tab  pantoprazole (PROTONIX) 40 MG EC tablet  polyethylene glycol (MIRALAX) 17 GM/Dose powder  rizatriptan (MAXALT) 10 MG tablet  simethicone (MYLICON) 80 MG chewable tablet  sitagliptin (JANUVIA) 100 MG tablet  tacrolimus (GENERIC EQUIVALENT) 1 MG capsule  traZODone (DESYREL) 50 MG tablet  valGANciclovir (VALCYTE) 450 MG tablet      Allergies   Allergen Reactions     Methylprednisolone Hives     Per patient, reaction occurred in  or earlier. Broke out in round, flat hives in neck and chest area. No shortness of breath or swelling. Unknown if reaction occurred  "immediately after administration.      Oxycodone      slurring     Droperidol Anxiety     Nsaids Other (See Comments)     GI bleed.     Prednisone Anxiety, Hives and Rash     Per patient she has tolerated this since     Social History   Social History     Tobacco Use     Smoking status: Never     Smokeless tobacco: Never   Substance Use Topics     Alcohol use: Not Currently     Comment: Last drink was in 2017     Drug use: Never      Past medical history and social history were reviewed with the patient. Additional pertinent items: None     Review of Systems  A complete review of systems was performed with pertinent positives and negatives noted in the HPI, and all other systems negative.    Physical Exam   BP: 132/73  Pulse: 72  Temp: 98.1  F (36.7  C)  Resp: 16  Height: 167.6 cm (5' 6\")  Weight: 80.3 kg (177 lb)  SpO2: 97 %    Physical Exam  General: no acute distress. Appears stated age.   HENT: dry MM, no oropharyngeal lesions  Eyes: PERRL, normal sclerae  Neck: non-tender, supple  Cardio: regular rate. Regular rhythm. Extremities well perfused  Resp: Normal work of breathing, normal respiratory rate.  Chest/Back: no visual signs of trauma, no CVA tenderness  Abdomen: diffuse moderate tenderness, non-distended, no rebound, no guarding. Large Y incision in upper abdomen is intact without erythema, discharge, nor warmth  Neuro: alert and fully oriented. CN II-XII grossly intact. Grossly normal strength and sensation in all extremities.   MSK: no deformities. Grossly normal ROM in extremities.   Integumentary/Skin: no rash visualized, normal color  Psych: normal affect, normal behavior    ED Course      Procedures            EKG Interpretation:      Interpreted by Bret Robles MD  Time reviewed: 0411  Symptoms at time of EKG: vomiting, abdominal pain, planned QT prolonging medications   Rhythm: normal sinus   Rate: Normal  Axis: Normal  Ectopy: none  Conduction: normal  ST Segments/ T Waves: No ST-T wave " changes and No acute ischemic changes; QTc 428  Q Waves: none  Comparison to prior: Compared to 11/1/2022 there is normalized QT interval with otherwise unchanged morphology    Clinical Impression: normal EKG     Labs Ordered and Resulted from Time of ED Arrival to Time of ED Departure   COMPREHENSIVE METABOLIC PANEL - Abnormal       Result Value    Sodium 139      Potassium 5.3      Chloride 107      Carbon Dioxide (CO2) 20 (*)     Anion Gap 12      Urea Nitrogen 16.3      Creatinine 1.25 (*)     Calcium 9.0      Glucose 120 (*)     Alkaline Phosphatase 179 (*)     AST 31      ALT 11      Protein Total 6.6      Albumin 3.6      Bilirubin Total 0.4      GFR Estimate 52 (*)    LIPASE - Abnormal    Lipase 7 (*)    TROPONIN T, HIGH SENSITIVITY - Abnormal    Troponin T, High Sensitivity 21 (*)    CBC WITH PLATELETS AND DIFFERENTIAL - Abnormal    WBC Count 3.6 (*)     RBC Count 3.13 (*)     Hemoglobin 9.4 (*)     Hematocrit 30.8 (*)     MCV 98      MCH 30.0      MCHC 30.5 (*)     RDW 15.7 (*)     Platelet Count 200      % Neutrophils 74      % Lymphocytes 15      % Monocytes 5      % Eosinophils 5      % Basophils 1      % Immature Granulocytes 0      NRBCs per 100 WBC 0      Absolute Neutrophils 2.6      Absolute Lymphocytes 0.5 (*)     Absolute Monocytes 0.2      Absolute Eosinophils 0.2      Absolute Basophils 0.1      Absolute Immature Granulocytes 0.0      Absolute NRBCs 0.0     LACTIC ACID WHOLE BLOOD - Normal    Lactic Acid 0.7     INFLUENZA A/B & SARS-COV2 PCR MULTIPLEX - Normal    Influenza A PCR Negative      Influenza B PCR Negative      RSV PCR Negative      SARS CoV2 PCR Negative     TROPONIN T, HIGH SENSITIVITY     CT Abdomen Pelvis w Contrast    (Results Pending)          Assessments & Plan (with Medical Decision Making)   Patient presenting with abdominal pain, vomiting, diarrhea in the context of a liver transplant one month ago and with onset soon after taking sumatriptan. Vitals in the ED unremarkable.  Nursing notes reviewed. Initial differential diagnosis includes but not limited to medication reaction, gastroenteritis, postsurgical complication, intra-abdominal infection, influenza.     Influenza and COVID-negative.  Electrolytes unremarkable.  Creatinine 1.25, at baseline.  No lipase elevation to suggest pancreatitis.  ECG without evidence of acute ischemia, initial high-sensitivity troponin borderline elevated, 2-hour repeat troponin pending.    In the ED, the patient's symptoms were managed with droperidol.    Transplant surgery consulted, awaiting their recommendations.    Patient signed out to oncoming ED provider with plan to follow-up repeat troponin, follow-up CT abdomen/pelvis, follow-up transplant surgery recommendations.  Dispo pending completion of the patient's evaluation.      Final diagnoses:   Vomiting and diarrhea   Status post liver transplantation (H)   Generalized abdominal pain     New Prescriptions    No medications on file         Medical Decision Making  The patient presented with a problem that is an acute illness with systemic symptoms.    The patient's evaluation involved ordering and review of 3+ test(s) and discussion of management or test interpretation with another health professional.    The patient's management involved drug therapy requiring intensive monitoring and a decision regarding hospitalization.    --  Bret Robles MD   Emergency Medicine   [facility] EMERGENCY DEPARTMENT  12/4/2022     Bret Robles MD  12/04/22 0737

## 2022-12-05 ENCOUNTER — APPOINTMENT (OUTPATIENT)
Dept: OCCUPATIONAL THERAPY | Facility: CLINIC | Age: 52
End: 2022-12-05
Attending: NURSE PRACTITIONER
Payer: COMMERCIAL

## 2022-12-05 LAB
ALBUMIN SERPL BCG-MCNC: 2.9 G/DL (ref 3.5–5.2)
ALP SERPL-CCNC: 127 U/L (ref 35–104)
ALT SERPL W P-5'-P-CCNC: <5 U/L (ref 10–35)
ANION GAP SERPL CALCULATED.3IONS-SCNC: 9 MMOL/L (ref 7–15)
AST SERPL W P-5'-P-CCNC: 19 U/L (ref 10–35)
ATRIAL RATE - MUSE: 69 BPM
BILIRUB DIRECT SERPL-MCNC: <0.2 MG/DL (ref 0–0.3)
BILIRUB SERPL-MCNC: 0.3 MG/DL
BUN SERPL-MCNC: 14.7 MG/DL (ref 6–20)
CALCIUM SERPL-MCNC: 8.4 MG/DL (ref 8.6–10)
CHLORIDE SERPL-SCNC: 108 MMOL/L (ref 98–107)
CMV DNA SPEC NAA+PROBE-ACNC: NOT DETECTED IU/ML
CREAT SERPL-MCNC: 1.09 MG/DL (ref 0.51–0.95)
DEPRECATED HCO3 PLAS-SCNC: 19 MMOL/L (ref 22–29)
DIASTOLIC BLOOD PRESSURE - MUSE: NORMAL MMHG
EBV DNA # SPEC NAA+PROBE: NOT DETECTED COPIES/ML
ERYTHROCYTE [DISTWIDTH] IN BLOOD BY AUTOMATED COUNT: 15.8 % (ref 10–15)
GFR SERPL CREATININE-BSD FRML MDRD: 62 ML/MIN/1.73M2
GLUCOSE BLDC GLUCOMTR-MCNC: 103 MG/DL (ref 70–99)
GLUCOSE BLDC GLUCOMTR-MCNC: 125 MG/DL (ref 70–99)
GLUCOSE BLDC GLUCOMTR-MCNC: 146 MG/DL (ref 70–99)
GLUCOSE BLDC GLUCOMTR-MCNC: 157 MG/DL (ref 70–99)
GLUCOSE BLDC GLUCOMTR-MCNC: 161 MG/DL (ref 70–99)
GLUCOSE BLDC GLUCOMTR-MCNC: 182 MG/DL (ref 70–99)
GLUCOSE SERPL-MCNC: 162 MG/DL (ref 70–99)
HCT VFR BLD AUTO: 26.4 % (ref 35–47)
HGB BLD-MCNC: 8 G/DL (ref 11.7–15.7)
INTERPRETATION ECG - MUSE: NORMAL
MAGNESIUM SERPL-MCNC: 2.1 MG/DL (ref 1.7–2.3)
MCH RBC QN AUTO: 30.7 PG (ref 26.5–33)
MCHC RBC AUTO-ENTMCNC: 30.3 G/DL (ref 31.5–36.5)
MCV RBC AUTO: 101 FL (ref 78–100)
P AXIS - MUSE: 22 DEGREES
PHOSPHATE SERPL-MCNC: 3.8 MG/DL (ref 2.5–4.5)
PLATELET # BLD AUTO: 159 10E3/UL (ref 150–450)
POTASSIUM SERPL-SCNC: 4.8 MMOL/L (ref 3.4–5.3)
PR INTERVAL - MUSE: 150 MS
PROT SERPL-MCNC: 5.3 G/DL (ref 6.4–8.3)
QRS DURATION - MUSE: 76 MS
QT - MUSE: 400 MS
QTC - MUSE: 428 MS
R AXIS - MUSE: -24 DEGREES
RBC # BLD AUTO: 2.61 10E6/UL (ref 3.8–5.2)
SODIUM SERPL-SCNC: 136 MMOL/L (ref 136–145)
SYSTOLIC BLOOD PRESSURE - MUSE: NORMAL MMHG
T AXIS - MUSE: 12 DEGREES
TACROLIMUS BLD-MCNC: 8.2 UG/L (ref 5–15)
TME LAST DOSE: NORMAL H
TME LAST DOSE: NORMAL H
VENTRICULAR RATE- MUSE: 69 BPM
WBC # BLD AUTO: 2.1 10E3/UL (ref 4–11)

## 2022-12-05 PROCEDURE — 97165 OT EVAL LOW COMPLEX 30 MIN: CPT | Mod: GO

## 2022-12-05 PROCEDURE — 250N000012 HC RX MED GY IP 250 OP 636 PS 637: Performed by: NURSE PRACTITIONER

## 2022-12-05 PROCEDURE — 83735 ASSAY OF MAGNESIUM: CPT | Performed by: NURSE PRACTITIONER

## 2022-12-05 PROCEDURE — 250N000013 HC RX MED GY IP 250 OP 250 PS 637: Performed by: PHYSICIAN ASSISTANT

## 2022-12-05 PROCEDURE — 99214 OFFICE O/P EST MOD 30 MIN: CPT | Performed by: PSYCHIATRY & NEUROLOGY

## 2022-12-05 PROCEDURE — 250N000011 HC RX IP 250 OP 636: Performed by: STUDENT IN AN ORGANIZED HEALTH CARE EDUCATION/TRAINING PROGRAM

## 2022-12-05 PROCEDURE — 258N000003 HC RX IP 258 OP 636: Performed by: STUDENT IN AN ORGANIZED HEALTH CARE EDUCATION/TRAINING PROGRAM

## 2022-12-05 PROCEDURE — 36415 COLL VENOUS BLD VENIPUNCTURE: CPT | Performed by: NURSE PRACTITIONER

## 2022-12-05 PROCEDURE — 250N000013 HC RX MED GY IP 250 OP 250 PS 637: Performed by: NURSE PRACTITIONER

## 2022-12-05 PROCEDURE — 250N000011 HC RX IP 250 OP 636: Performed by: NURSE PRACTITIONER

## 2022-12-05 PROCEDURE — 97140 MANUAL THERAPY 1/> REGIONS: CPT | Mod: GO

## 2022-12-05 PROCEDURE — 85014 HEMATOCRIT: CPT | Performed by: NURSE PRACTITIONER

## 2022-12-05 PROCEDURE — 250N000013 HC RX MED GY IP 250 OP 250 PS 637: Performed by: STUDENT IN AN ORGANIZED HEALTH CARE EDUCATION/TRAINING PROGRAM

## 2022-12-05 PROCEDURE — 87040 BLOOD CULTURE FOR BACTERIA: CPT | Performed by: NURSE PRACTITIONER

## 2022-12-05 PROCEDURE — 84100 ASSAY OF PHOSPHORUS: CPT | Performed by: NURSE PRACTITIONER

## 2022-12-05 PROCEDURE — 84155 ASSAY OF PROTEIN SERUM: CPT | Performed by: NURSE PRACTITIONER

## 2022-12-05 PROCEDURE — 96372 THER/PROPH/DIAG INJ SC/IM: CPT | Performed by: NURSE PRACTITIONER

## 2022-12-05 PROCEDURE — 120N000011 HC R&B TRANSPLANT UMMC

## 2022-12-05 PROCEDURE — 82310 ASSAY OF CALCIUM: CPT | Performed by: NURSE PRACTITIONER

## 2022-12-05 PROCEDURE — 80197 ASSAY OF TACROLIMUS: CPT | Performed by: NURSE PRACTITIONER

## 2022-12-05 PROCEDURE — 82248 BILIRUBIN DIRECT: CPT | Performed by: NURSE PRACTITIONER

## 2022-12-05 RX ORDER — KETOROLAC TROMETHAMINE 15 MG/ML
15 INJECTION, SOLUTION INTRAMUSCULAR; INTRAVENOUS ONCE
Status: COMPLETED | OUTPATIENT
Start: 2022-12-05 | End: 2022-12-05

## 2022-12-05 RX ORDER — ONDANSETRON 4 MG/1
8 TABLET, ORALLY DISINTEGRATING ORAL EVERY 8 HOURS PRN
Status: DISCONTINUED | OUTPATIENT
Start: 2022-12-05 | End: 2022-12-06 | Stop reason: HOSPADM

## 2022-12-05 RX ORDER — PROCHLORPERAZINE 25 MG
25 SUPPOSITORY, RECTAL RECTAL EVERY 12 HOURS PRN
Status: DISCONTINUED | OUTPATIENT
Start: 2022-12-05 | End: 2022-12-06 | Stop reason: HOSPADM

## 2022-12-05 RX ORDER — DIPHENHYDRAMINE HYDROCHLORIDE 50 MG/ML
25 INJECTION INTRAMUSCULAR; INTRAVENOUS ONCE
Status: COMPLETED | OUTPATIENT
Start: 2022-12-05 | End: 2022-12-05

## 2022-12-05 RX ORDER — TOPIRAMATE 25 MG/1
50 TABLET, FILM COATED ORAL DAILY
Status: DISCONTINUED | OUTPATIENT
Start: 2022-12-05 | End: 2022-12-06

## 2022-12-05 RX ORDER — PROCHLORPERAZINE MALEATE 10 MG
10 TABLET ORAL EVERY 6 HOURS PRN
Status: DISCONTINUED | OUTPATIENT
Start: 2022-12-05 | End: 2022-12-06 | Stop reason: HOSPADM

## 2022-12-05 RX ORDER — METHOCARBAMOL 500 MG/1
500 TABLET, FILM COATED ORAL 4 TIMES DAILY PRN
Status: DISCONTINUED | OUTPATIENT
Start: 2022-12-05 | End: 2022-12-06

## 2022-12-05 RX ORDER — METOCLOPRAMIDE HYDROCHLORIDE 5 MG/ML
5 INJECTION INTRAMUSCULAR; INTRAVENOUS ONCE
Status: COMPLETED | OUTPATIENT
Start: 2022-12-05 | End: 2022-12-05

## 2022-12-05 RX ADMIN — PANTOPRAZOLE SODIUM 40 MG: 40 TABLET, DELAYED RELEASE ORAL at 08:05

## 2022-12-05 RX ADMIN — MAGNESIUM OXIDE TAB 400 MG (241.3 MG ELEMENTAL MG) 400 MG: 400 (241.3 MG) TAB at 08:06

## 2022-12-05 RX ADMIN — GABAPENTIN 200 MG: 100 CAPSULE ORAL at 20:41

## 2022-12-05 RX ADMIN — TACROLIMUS 6 MG: 5 CAPSULE ORAL at 17:15

## 2022-12-05 RX ADMIN — TRAZODONE HYDROCHLORIDE 50 MG: 50 TABLET ORAL at 23:08

## 2022-12-05 RX ADMIN — ASPIRIN 81 MG CHEWABLE TABLET 81 MG: 81 TABLET CHEWABLE at 08:05

## 2022-12-05 RX ADMIN — INSULIN ASPART: 100 INJECTION, SOLUTION INTRAVENOUS; SUBCUTANEOUS at 20:47

## 2022-12-05 RX ADMIN — POLYETHYLENE GLYCOL 3350 17 G: 17 POWDER, FOR SOLUTION ORAL at 20:41

## 2022-12-05 RX ADMIN — DAPSONE 50 MG: 25 TABLET ORAL at 08:05

## 2022-12-05 RX ADMIN — DIPHENHYDRAMINE HYDROCHLORIDE 25 MG: 50 INJECTION, SOLUTION INTRAMUSCULAR; INTRAVENOUS at 10:31

## 2022-12-05 RX ADMIN — PROCHLORPERAZINE EDISYLATE 10 MG: 5 INJECTION INTRAMUSCULAR; INTRAVENOUS at 08:20

## 2022-12-05 RX ADMIN — MYCOPHENOLATE MOFETIL 750 MG: 250 CAPSULE ORAL at 08:04

## 2022-12-05 RX ADMIN — SITAGLIPTIN 100 MG: 100 TABLET, FILM COATED ORAL at 08:20

## 2022-12-05 RX ADMIN — GABAPENTIN 200 MG: 100 CAPSULE ORAL at 08:06

## 2022-12-05 RX ADMIN — PANCRELIPASE 2 CAPSULE: 24000; 76000; 120000 CAPSULE, DELAYED RELEASE PELLETS ORAL at 17:15

## 2022-12-05 RX ADMIN — RIZATRIPTAN BENZOATE 10 MG: 10 TABLET ORAL at 06:19

## 2022-12-05 RX ADMIN — POLYETHYLENE GLYCOL 3350 17 G: 17 POWDER, FOR SOLUTION ORAL at 08:04

## 2022-12-05 RX ADMIN — KETOROLAC TROMETHAMINE 15 MG: 15 INJECTION, SOLUTION INTRAMUSCULAR; INTRAVENOUS at 10:30

## 2022-12-05 RX ADMIN — MAGNESIUM SULFATE IN WATER 2 G: 40 INJECTION, SOLUTION INTRAVENOUS at 01:34

## 2022-12-05 RX ADMIN — ONDANSETRON 8 MG: 4 TABLET, ORALLY DISINTEGRATING ORAL at 06:57

## 2022-12-05 RX ADMIN — TACROLIMUS 6 MG: 5 CAPSULE ORAL at 08:05

## 2022-12-05 RX ADMIN — PANCRELIPASE 2 CAPSULE: 24000; 76000; 120000 CAPSULE, DELAYED RELEASE PELLETS ORAL at 14:42

## 2022-12-05 RX ADMIN — Medication 10 MG: at 01:53

## 2022-12-05 RX ADMIN — FOLIC ACID 1 MG: 1 TABLET ORAL at 08:05

## 2022-12-05 RX ADMIN — MAGNESIUM OXIDE TAB 400 MG (241.3 MG ELEMENTAL MG) 400 MG: 400 (241.3 MG) TAB at 13:32

## 2022-12-05 RX ADMIN — GABAPENTIN 200 MG: 100 CAPSULE ORAL at 13:28

## 2022-12-05 RX ADMIN — INSULIN GLARGINE 12 UNITS: 100 INJECTION, SOLUTION SUBCUTANEOUS at 09:38

## 2022-12-05 RX ADMIN — ACETAMINOPHEN 975 MG: 325 TABLET, FILM COATED ORAL at 02:47

## 2022-12-05 RX ADMIN — ACETAMINOPHEN 975 MG: 325 TABLET, FILM COATED ORAL at 10:35

## 2022-12-05 RX ADMIN — ACETAMINOPHEN 975 MG: 325 TABLET, FILM COATED ORAL at 18:16

## 2022-12-05 RX ADMIN — VALGANCICLOVIR HYDROCHLORIDE 450 MG: 450 TABLET ORAL at 08:04

## 2022-12-05 RX ADMIN — LEVOTHYROXINE SODIUM 150 MCG: 0.07 TABLET ORAL at 08:05

## 2022-12-05 RX ADMIN — TOPIRAMATE 50 MG: 25 TABLET, FILM COATED ORAL at 12:11

## 2022-12-05 RX ADMIN — SODIUM CHLORIDE 500 ML: 9 INJECTION, SOLUTION INTRAVENOUS at 10:28

## 2022-12-05 RX ADMIN — METOCLOPRAMIDE 5 MG: 5 INJECTION, SOLUTION INTRAMUSCULAR; INTRAVENOUS at 10:30

## 2022-12-05 RX ADMIN — ESCITALOPRAM OXALATE 20 MG: 10 TABLET ORAL at 08:05

## 2022-12-05 RX ADMIN — MYCOPHENOLATE MOFETIL 750 MG: 250 CAPSULE ORAL at 17:15

## 2022-12-05 ASSESSMENT — ACTIVITIES OF DAILY LIVING (ADL)
ADLS_ACUITY_SCORE: 39

## 2022-12-05 NOTE — PLAN OF CARE
"Goal Outcome Evaluation:    Assumed cares from 23:00 to 07:30    Blood pressure 122/79, pulse 62, temperature 97.6  F (36.4  C), temperature source Oral, resp. rate 18, height 1.676 m (5' 6\"), weight 80.3 kg (177 lb), SpO2 97 %, not currently breastfeeding.    AVSS, A/O x 4. She is on room air and saturating well. Pt continue to complain of severe migraine headache from her mid head to the right side and also to her neck. She is using hat packs and A-quak pad to area and she also got Maxalt one time tablet this morning in addition to her 975 mg scheduled Tylenol. Pt is refusing her Trazodone. Left PIV SL'ed. Zofran requested this morning for nausea with no emesis. UAL independently to the bathroom and voiding well. Continue to monitor pt and follow plan of care.      Problem: Plan of Care - These are the overarching goals to be used throughout the patient stay.    Goal: Plan of Care Review  Description: The Plan of Care Review/Shift note should be completed every shift.  The Outcome Evaluation is a brief statement about your assessment that the patient is improving, declining, or no change.  This information will be displayed automatically on your shift note.  Outcome: Not Progressing     Problem: Plan of Care - These are the overarching goals to be used throughout the patient stay.    Goal: Patient-Specific Goal (Individualized)  Description: You can add care plan individualizations to a care plan. Examples of Individualization might be:  \"Parent requests to be called daily at 9am for status\", \"I have a hard time hearing out of my right ear\", or \"Do not touch me to wake me up as it startles me\".  Outcome: Not Progressing     Problem: Plan of Care - These are the overarching goals to be used throughout the patient stay.    Goal: Absence of Hospital-Acquired Illness or Injury  Outcome: Not Progressing  Intervention: Identify and Manage Fall Risk  Recent Flowsheet Documentation  Taken 12/5/2022 0400 by Eri Steiner " AJAY SOL  Safety Promotion/Fall Prevention:   activity supervised   clutter free environment maintained   nonskid shoes/slippers when out of bed  Taken 12/5/2022 0151 by Eri Steiner RN  Safety Promotion/Fall Prevention:   activity supervised   clutter free environment maintained   nonskid shoes/slippers when out of bed  Intervention: Prevent and Manage VTE (Venous Thromboembolism) Risk  Recent Flowsheet Documentation  Taken 12/5/2022 0400 by Eri Steiner RN  VTE Prevention/Management: SCDs (sequential compression devices) off  Taken 12/5/2022 0151 by Eri Steiner RN  VTE Prevention/Management: SCDs (sequential compression devices) off     Problem: Plan of Care - These are the overarching goals to be used throughout the patient stay.    Goal: Absence of Hospital-Acquired Illness or Injury  Intervention: Identify and Manage Fall Risk  Recent Flowsheet Documentation  Taken 12/5/2022 0400 by Eri Steiner RN  Safety Promotion/Fall Prevention:   activity supervised   clutter free environment maintained   nonskid shoes/slippers when out of bed  Taken 12/5/2022 0151 by Eri Steiner RN  Safety Promotion/Fall Prevention:   activity supervised   clutter free environment maintained   nonskid shoes/slippers when out of bed     Problem: Plan of Care - These are the overarching goals to be used throughout the patient stay.    Goal: Absence of Hospital-Acquired Illness or Injury  Intervention: Prevent and Manage VTE (Venous Thromboembolism) Risk  Recent Flowsheet Documentation  Taken 12/5/2022 0400 by Eri Steiner RN  VTE Prevention/Management: SCDs (sequential compression devices) off  Taken 12/5/2022 0151 by Eri Steiner RN  VTE Prevention/Management: SCDs (sequential compression devices) off     Problem: Plan of Care - These are the overarching goals to be used throughout the patient stay.    Goal: Optimal Comfort and Wellbeing  Outcome: Not Progressing  Intervention: Monitor Pain and  Promote Comfort  Recent Flowsheet Documentation  Taken 12/5/2022 0151 by Eri Steiner RN  Pain Management Interventions:   medication (see MAR)   pillow support provided   heat applied     Problem: Plan of Care - These are the overarching goals to be used throughout the patient stay.    Goal: Optimal Comfort and Wellbeing  Intervention: Monitor Pain and Promote Comfort  Recent Flowsheet Documentation  Taken 12/5/2022 0151 by Eri Steiner RN  Pain Management Interventions:   medication (see MAR)   pillow support provided   heat applied

## 2022-12-05 NOTE — PROGRESS NOTES
"[facility]  Neurology Consultation - Progress Note    Patient Name:  Susan Puckett  Date of Service:  December 5, 2022    Subjective:    Patient continuing to endorse 8/10 right frontal headache with posterior radiation, associated with photophobia/phonophobia. She grimaces throughout interview. No new symptoms, still having right thigh numbness. The 2 mg IV mag given last night did not help.     Objective:    Vitals: /73 (BP Location: Right arm)   Pulse 87   Temp 97.4  F (36.3  C) (Oral)   Resp 18   Ht 1.676 m (5' 6\")   Wt 77 kg (169 lb 12.1 oz)   SpO2 97%   BMI 27.40 kg/m    General: Lying in bed, NAD  Head: Atraumatic, normocephalic   Cardiac: no lower extremity edema  Neurologic:  Fully alert, attentive and oriented. Speech clear and fluent. Decreased wrinkling in lower forehead/between eyebrows. Mild ptosis bialterally (baseline). Decreased sensation over right lateral thigh. Hip flexion, hand  5/5 bilaterally.     Pertinent Investigations:    I have personally reviewed most recent and pertinent labs, tests, and radiological images.     Assessment  Ms. 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 Neurology consulted for headache and right thigh numbness.      #Headache   Likely tension with migranous features versus primary migraine disorder. Reassuring that headache is same type and location after transplant compared to before transplant. I do not think there is new intracranial pathology (ex. Meningitis, PRES, etc.) that would explain her symptoms given non-focal neurological exam, but tacrolimus/mycophenolate can rarely exacerbate an underlying headache disorder. We " will work on trying to decrease severity of headache and have her follow up in outpatient neurology clinic for further management.   - Migraine cocktail x 1 this morning   - Inpatient Physical therapy consult to address neck tightness/pain   - Heat pack to the neck PRN   - Avoid scheduled Tylenol/NSAID's/narcotics for headache, as frequent administration of pain medications can lead to rebound headache phenomenon.   - No indication for MRI or LP at this time.      #Right thigh numbness  Numbness and mild + sensory phenomenon in the right lateral femoral cutaneous nerve is most c/w meralgia paresthetica. This is a benign neuropathy associated with weight gain and tight fitting clothing. This can also be associated with positioning in the OR. It is expected to improve. Patient was counseled regarding prognosis, and likelihood that it will improve over the course of months.   - Continue gabapentin as scheduled   - Avoid tight belts/clothing/heavy weights along waist line.      Thank you for involving Neurology in the care of Susan Puckett.  Please do not hesitate to call with questions/concerns (consult pager [phone]).       Patient was discussed with Dr. Cruz.      Neel Haas MD  PGY-4 Neurology Resident   P: [id number]

## 2022-12-05 NOTE — PROGRESS NOTES
"/79 (BP Location: Right arm)   Pulse 65   Temp 97.3  F (36.3  C) (Oral)   Resp 18   Ht 1.676 m (5' 6\")   Wt 77 kg (169 lb 12.1 oz)   SpO2 98%   BMI 27.40 kg/m      [id number]  VSS on RA. A+Ox4. ACHS with carb coverage, lantus given this morning. Plan for oncoming nurse to give lunch coverage as pt still working on lunch now. Pt wears dexcom. Migraine treated with migraine cocktail and scheduled tylenol. Regular diet, poor appetite this am due to nausea. Compazine x1. L PIV SL. 500 ml NS bolus given this shift. LBM yesterday, miralax given. Voiding WOD, saving in hat in toilet. Healed abdominal incision. Up ad adriano, pt ambulated in room and in hallway. Up in chair for part of shift. Neurology following. PT/OT consulted.  Writer updated  this shift. Will continue to monitor and notify team with changes.   "

## 2022-12-05 NOTE — PROGRESS NOTES
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 Post-transplant course complicated by renal insufficiency, moderate malnutrition, steroid-induced hyperglycemia, and hypervolemia. She presents with nausea/vomiting and migraines.    S/p liver transplant 11/2/22: AST/ALT/T bili within normal. Alk phos mildly elevated. CT w/ contrast with postoperative changes of liver vs ?hepatitis vs. ?acute rejection. US patent, GGT Given graft function normal more likely related to post-operative changes, GGT mildly elevated. No intervention needed, Alk phos trending down 127 today. Continue ASA 81 mg daily.    Immunosuppression management:  -Tacrolimus 6 mg BID. Tacrolimus goal level 8-12 (closer to 8 with migraines per Dr. Dunham).   -Mycophenolate 750 mg BID  Complexity of management:Low. Contributing factors: nausea     Neurology/Psych:  Acute bilateral leg pain; Right thigh numbness: PTA gabapentin 200 mg BID increased to TID.   History of hypoglycemic seizures: Continue PTA Topamax.   History of depression: Continue PTA Lexapro, Trazadone.   Migraines: PTA on Rizatriptan PRN. Neurology consulted, and description more c/w cervicalgia or headache rather than migraine.   -PT consulted to address neck tightness  -Heat application encouraged  -Migraine cocktail (Reglan, Toradol, Benadryl, Bolus) this AM  -No need for MRI per neurology given long history of migraines prior to transplant  -Tacrolimus goal closer to 8 as above    Hematology:   Leukopenia: Likely r/t medications. WBC 2-3. Monitor.  Acute blood loss/Anemia of chronic disease: Hgb stable ~8.    Cardiorespiratory: Stable, no acute issues.     GI/Nutrition:  History of gastric bypass; Moderate malnutrition in the context of acute on chronic illness:  Malnutrition diagnosed on previous admission. Continue regular diet, SAMI multivitamin.   History of pancreatic insufficiency: Continue Creon.  Nausea/Vomiting/Constipation: Aggressive bowel regimen given constipation on CT. Continue PRN Zofran, Compazine. Reglanx1 as above.    Endocrine:   DM type 2: Discharge on Lantus, sliding scale Novolog, carb coverage.   History of hypothyroidism: Continue Synthroid.    Fluid/Electrolytes:   Hypomagnesemia: Continue PTA Mag-Ox.  Hypervolemia in the setting of hypoalbuminemia; BLE edema: Ongoing issue. Last Echo 11/19 with normal EF. Lymphedema team consulted.     : No acute issues    Infectious disease: EBV, CMV, blood cultures pending.     Prophylaxis: DVT (Mechanical), fall, viral ([place]), pneumocystis ([place])    Disposition: 7A, plan for discharge in 1-2 days pending resolution of nausea/headaches.     Medical Decision Making: Low  Admit [id number] (straight-forward/low level decision making)    ANYA/Fellow/Resident Provider: Bhavana Romo NP     Faculty: Bernice Dunham MD    __________________________________________________________________  Transplant History: Admitted 12/4/2022 for nausea/vomiting and migraines.   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    11/2/2022 (Liver), Postoperative day: 33     Interval History: History is obtained from the patient  No acute issues overnight. Continues to report headache, neck tightness, nausea. No vomiting.     ROS:   A 10-point review of systems was negative except as noted above.    Curent Meds:    acetaminophen  975 mg Oral Q8H     amylase-lipase-protease  2 capsule Oral TID w/meals     aspirin  81 mg Oral Daily     [START ON 12/7/2022] cyanocobalamin  1,000 mcg Oral Q7 Days     dapsone  50 mg Oral Daily     escitalopram  20 mg Oral Daily     folic acid  1 mg Oral QAM     gabapentin  200 mg Oral TID     insulin aspart   Subcutaneous BID 09 12     insulin aspart   Subcutaneous  "Daily with supper     insulin glargine  12 Units Subcutaneous QAM AC     levothyroxine  150 mcg Oral Daily     magnesium oxide  400 mg Oral TID     mycophenolate  750 mg Oral two times daily     pantoprazole  40 mg Oral QAM AC     polyethylene glycol  17 g Oral BID     sitagliptin  100 mg Oral Daily     sodium chloride (PF)  3 mL Intracatheter Q8H     tacrolimus  6 mg Oral two times daily     topiramate  50 mg Oral Daily     traZODone  50 mg Oral At Bedtime     valGANciclovir  450 mg Oral Daily       Physical Exam:     Admit Weight: 80.3 kg (177 lb)    Current Vitals:   /79 (BP Location: Right arm)   Pulse 65   Temp 97.3  F (36.3  C) (Oral)   Resp 18   Ht 1.676 m (5' 6\")   Wt 77 kg (169 lb 12.1 oz)   SpO2 98%   BMI 27.40 kg/m           Vital sign ranges:    Temp:  [97.3  F (36.3  C)-97.7  F (36.5  C)] 97.3  F (36.3  C)  Pulse:  [62-72] 65  Resp:  [18] 18  BP: (106-122)/(79-80) 110/79  SpO2:  [95 %-100 %] 98 %  Patient Vitals for the past 24 hrs:   BP Temp Temp src Pulse Resp SpO2 Weight   12/05/22 1007 110/79 97.3  F (36.3  C) Oral 65 18 98 % --   12/05/22 0748 -- -- -- -- -- -- 77 kg (169 lb 12.1 oz)   12/05/22 0558 122/79 97.6  F (36.4  C) Oral 62 18 97 % --   12/05/22 0151 -- 97.4  F (36.3  C) Oral 62 18 95 % --   12/04/22 2145 122/79 97.4  F (36.3  C) Oral 72 18 100 % --   12/04/22 1804 106/80 97.7  F (36.5  C) Oral 66 18 95 % --     General Appearance: in no apparent distress.   Skin: normal, No rashes, induration, or jaundice  Heart: regular rate  Lungs: nonlabored  Abdomen: soft, nontender, nondistended, transplant incision healing well  : n/a  Extremities: edema: present bilateral. 1+. There is no skin breakdown.  Neurologic: alert and oriented, drowsy. Tremor absent.    Frailty Scores     Frailty Scores 1/6/2022 10/18/2020    Final Score Frail Not Frail    Final Score Number 3 2          Data:   CMP  Recent Labs   Lab 12/05/22  0737 12/05/22  0553 12/04/22  1807 12/04/22  0448 12/01/22  0852 " 12/01/22  0852   NA  --  136  --  139  --  137   POTASSIUM  --  4.8  --  5.3  --  4.6   CHLORIDE  --  108*  --  107  --  108   CO2  --  19*  --  20*  --  24   * 162*   < > 120*   < > 188*   BUN  --  14.7  --  16.3  --  15   CR  --  1.09*  --  1.25*  --  1.28*   GFRESTIMATED  --  62  --  52*  --  51*   MAURI  --  8.4*  --  9.0  --  8.6   MAG  --  2.1  --   --   --  1.4*   PHOS  --  3.8  --   --   --  4.5   LIPASE  --   --   --  7*  --   --    ALBUMIN  --  2.9*  --  3.6  --  2.8*   BILITOTAL  --  0.3  --  0.4  --  0.4   ALKPHOS  --  127*  --  179*  --  113   AST  --  19  --  31  --  18   ALT  --  <5*  --  11  --  14    < > = values in this interval not displayed.     CBC  Recent Labs   Lab 12/05/22  0553 12/04/22  0448   HGB 8.0* 9.4*   WBC 2.1* 3.6*    200     COAGSNo lab results found in last 7 days.    Invalid input(s): XA   Urinalysis  Recent Labs   Lab Test 11/19/22  0937 11/05/22  0928   COLOR Yellow Light Yellow   APPEARANCE Slightly Cloudy* Clear   URINEGLC Negative Negative   URINEBILI Negative Negative   URINEKETONE Negative 10*   SG 1.017 1.026   UBLD Negative Negative   URINEPH 5.5 5.5   PROTEIN 20* 20*   NITRITE Positive* Negative   LEUKEST Large* Negative   RBCU 3* 1   WBCU >182* 3     Virology:  Hepatitis C Antibody   Date Value Ref Range Status   11/01/2022 Nonreactive Nonreactive Final

## 2022-12-05 NOTE — PROGRESS NOTES
12/05/22 1600   Appointment Info   Signing Clinician's Name / Credentials (OT) Anne Santamaria OTR/L   Edema General Information   Onset of Edema   (acute on chronic)   Affected Body Part(s) Left LE;Right LE   Edema Etiology Surgery   Etiology Comments hypervolemia, decreased muscle pump activation, low albumin   Edema Precaution Comments No known precautions   General Comments/Previous Edema Treatment/Edema Equipment Pt has had OP lymphedema recently.   Edema Examination/Assessment   Skin Condition Pitting;Dryness;Intact   Skin Condition Comments Skin intact but dry, shiny, and taut with very firm 2+ pitting edema present from MTPs to knee creases.   Scar No   Ulcerations No   Skin Integrity Intact and dry   Pitting Assessment Firm 2+   Clinical Impression   Criteria for Skilled Therapeutic Interventions Met (OT) Yes, treatment indicated   Edema: Patient Presentation Edema   Edema: Planned Interventions Gradient compression bandaging;Fit for compression garment;Edema exercises;Precautions to prevent infection/exacerbation   Clinical Decision Making Complexity (OT) low complexity   Predicted Duration of Therapy 1 week   Risk & Benefits of therapy have been explained evaluation/treatment results reviewed;care plan/treatment goals reviewed;risks/benefits reviewed;current/potential barriers reviewed;participants voiced agreement with care plan;participants included;patient;spouse/significant other   Clinical Impression Comments Pt presents to OT today with increased BLE edema, impacting optimal functional mobility and ADL performance. Pt to benefit from skilled edema services to address the previous mentioned problem list.   OT Total Evaluation Time   OT [place] Low Complexity Minutes (36123) 4   OT Goals   Therapy Frequency (OT) 4 times/wk  (edema)   OT: Edema education to increase ability to manage edema after discharge from the hospital Patient;Caregiver;Verbalize;Demonstrate;[place];Skin care  routine;signs/symptoms of intolerance;wear schedule;limb positioning;garrnet/bandage care;discharge recommendations   OT: Management of edema bandages Patient;Caregiver;Verbalize;Demonstrate;[place];quick wrap;garment(s)   OT: Functional edema exercise program to reduce limb volume, increase activity tolerance and improve independence with ADL Patient;Demonstrates;[place];HEP   Total Session Time   Total Session Time (sum of timed and untimed services) 4

## 2022-12-05 NOTE — PLAN OF CARE
"/80 (BP Location: Left arm)   Pulse 66   Temp 97.7  F (36.5  C) (Oral)   Resp 18   Ht 1.676 m (5' 6\")   Wt 80.3 kg (177 lb)   SpO2 95%   BMI 28.57 kg/m      Shift: 0921-4099  Isolation Status: none  VS: stable on room air, afebrile  Neuro: Aox4  Behaviors: calm, cooperative  BG: achs + carb coverage  Respiratory: WDL  Cardiac: WDL  Pain/Nausea: headache, denies nausea  PRN: no pain medication needed during my shift  Diet: regular  IV Access: left PIV SL  Infusion(s): none  Lines/Drains: none  GI/: constipation with stool burden, voiding spontaneously without difficulty   Skin: Dexcom  on lower left abd quad, transverse abd incision scar  Mobility: UAL  Plan: tap water enema tonight + bowel meds     Admitted/transferred from: ED  Time of arrival on unit 1800  2 RN full  skin assessment completed by M.D & KS  Skin assessment finding: skin intact, no problems   Interventions/actions: none needed at this time    Will continue to monitor.      "

## 2022-12-05 NOTE — UTILIZATION REVIEW
Admission Status; Secondary Review Determination    Under the authority of the Utilization Management Committee, the utilization review process indicated a secondary review on the above patient. The review outcome is based on review of the medical records, discussions with staff, and applying clinical experience noted on the date of the review.    (x) Inpatient Status Appropriate - This patient's medical care is consistent with medical management for inpatient care and reasonable inpatient medical practice.    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  CT imaging reveals significant periportal edema as well as mesenteric stranding and reactive lymphadenopathy about the mary hepatis with concern for potential early transplant rejection.  Patient also has diffuse features of fluid overload including soft tissue anasarca and mesenteric edema.  With patient's significant ongoing nausea and need for careful fluid management as well as adequate headache control with multi factorial etiologies including history of migraines, post transplant pharmaceutical headaches and failure to respond adequately to multi drug management.  Patient expected to require several days of acute management appropriate for inpatient management.     Conditional review: if patient's symptoms rapidly improve on 12/5 allowing discharge on 12/6, then patient would be appropriate for observation status.    At the time of admission with the information available to the attending physician more than 2 nights Hospital complex care was anticipated, based on patient risk of adverse outcome if treated as outpatient and complex care required. Inpatient admission is appropriate based on the Medicare guidelines.    This document was produced using voice recognition software    The information on this document is developed by  the utilization review team in order for the business office to ensure compliance. This only denotes the appropriateness of proper admission status and does not reflect the quality of care rendered.    The definitions of Inpatient Status and Observation Status used in making the determination above are those provided in the CMS Coverage Manual, Chapter 1 and Chapter 6, section 70.4.    Sincerely,    Jeremi García MD  Utilization Review  Physician Advisor  [facility].

## 2022-12-06 ENCOUNTER — APPOINTMENT (OUTPATIENT)
Dept: OCCUPATIONAL THERAPY | Facility: CLINIC | Age: 52
End: 2022-12-06
Payer: COMMERCIAL

## 2022-12-06 ENCOUNTER — TELEPHONE (OUTPATIENT)
Dept: TRANSPLANT | Facility: CLINIC | Age: 52
End: 2022-12-06

## 2022-12-06 VITALS
BODY MASS INDEX: 27.83 KG/M2 | TEMPERATURE: 98 F | HEIGHT: 66 IN | RESPIRATION RATE: 16 BRPM | DIASTOLIC BLOOD PRESSURE: 83 MMHG | OXYGEN SATURATION: 96 % | SYSTOLIC BLOOD PRESSURE: 130 MMHG | HEART RATE: 71 BPM | WEIGHT: 173.2 LBS

## 2022-12-06 LAB
ALBUMIN SERPL BCG-MCNC: 2.7 G/DL (ref 3.5–5.2)
ALP SERPL-CCNC: 109 U/L (ref 35–104)
ALT SERPL W P-5'-P-CCNC: <5 U/L (ref 10–35)
ANION GAP SERPL CALCULATED.3IONS-SCNC: 11 MMOL/L (ref 7–15)
AST SERPL W P-5'-P-CCNC: 15 U/L (ref 10–35)
BILIRUB DIRECT SERPL-MCNC: <0.2 MG/DL (ref 0–0.3)
BILIRUB SERPL-MCNC: 0.3 MG/DL
BUN SERPL-MCNC: 17.9 MG/DL (ref 6–20)
CALCIUM SERPL-MCNC: 8.1 MG/DL (ref 8.6–10)
CHLORIDE SERPL-SCNC: 110 MMOL/L (ref 98–107)
CREAT SERPL-MCNC: 1.19 MG/DL (ref 0.51–0.95)
DEPRECATED HCO3 PLAS-SCNC: 18 MMOL/L (ref 22–29)
ERYTHROCYTE [DISTWIDTH] IN BLOOD BY AUTOMATED COUNT: 15.9 % (ref 10–15)
GFR SERPL CREATININE-BSD FRML MDRD: 55 ML/MIN/1.73M2
GLUCOSE BLDC GLUCOMTR-MCNC: 105 MG/DL (ref 70–99)
GLUCOSE BLDC GLUCOMTR-MCNC: 138 MG/DL (ref 70–99)
GLUCOSE BLDC GLUCOMTR-MCNC: 50 MG/DL (ref 70–99)
GLUCOSE BLDC GLUCOMTR-MCNC: 65 MG/DL (ref 70–99)
GLUCOSE SERPL-MCNC: 89 MG/DL (ref 70–99)
HCT VFR BLD AUTO: 25 % (ref 35–47)
HGB BLD-MCNC: 7.6 G/DL (ref 11.7–15.7)
MAGNESIUM SERPL-MCNC: 1.9 MG/DL (ref 1.7–2.3)
MCH RBC QN AUTO: 30 PG (ref 26.5–33)
MCHC RBC AUTO-ENTMCNC: 30.4 G/DL (ref 31.5–36.5)
MCV RBC AUTO: 99 FL (ref 78–100)
PHOSPHATE SERPL-MCNC: 4.4 MG/DL (ref 2.5–4.5)
PLATELET # BLD AUTO: 176 10E3/UL (ref 150–450)
POTASSIUM SERPL-SCNC: 4.8 MMOL/L (ref 3.4–5.3)
PROT SERPL-MCNC: 4.9 G/DL (ref 6.4–8.3)
RBC # BLD AUTO: 2.53 10E6/UL (ref 3.8–5.2)
SODIUM SERPL-SCNC: 139 MMOL/L (ref 136–145)
WBC # BLD AUTO: 2.4 10E3/UL (ref 4–11)

## 2022-12-06 PROCEDURE — 84100 ASSAY OF PHOSPHORUS: CPT | Performed by: NURSE PRACTITIONER

## 2022-12-06 PROCEDURE — 82962 GLUCOSE BLOOD TEST: CPT | Mod: 91

## 2022-12-06 PROCEDURE — G0378 HOSPITAL OBSERVATION PER HR: HCPCS

## 2022-12-06 PROCEDURE — 250N000013 HC RX MED GY IP 250 OP 250 PS 637: Performed by: NURSE PRACTITIONER

## 2022-12-06 PROCEDURE — 97535 SELF CARE MNGMENT TRAINING: CPT | Mod: GO

## 2022-12-06 PROCEDURE — 80053 COMPREHEN METABOLIC PANEL: CPT | Performed by: NURSE PRACTITIONER

## 2022-12-06 PROCEDURE — 85027 COMPLETE CBC AUTOMATED: CPT | Performed by: NURSE PRACTITIONER

## 2022-12-06 PROCEDURE — 99214 OFFICE O/P EST MOD 30 MIN: CPT | Mod: GC | Performed by: PSYCHIATRY & NEUROLOGY

## 2022-12-06 PROCEDURE — 999N000128 HC STATISTIC PERIPHERAL IV START W/O US GUIDANCE

## 2022-12-06 PROCEDURE — 83735 ASSAY OF MAGNESIUM: CPT | Performed by: NURSE PRACTITIONER

## 2022-12-06 PROCEDURE — 250N000013 HC RX MED GY IP 250 OP 250 PS 637: Performed by: STUDENT IN AN ORGANIZED HEALTH CARE EDUCATION/TRAINING PROGRAM

## 2022-12-06 PROCEDURE — 36415 COLL VENOUS BLD VENIPUNCTURE: CPT | Performed by: NURSE PRACTITIONER

## 2022-12-06 PROCEDURE — 250N000011 HC RX IP 250 OP 636: Performed by: NURSE PRACTITIONER

## 2022-12-06 PROCEDURE — 250N000012 HC RX MED GY IP 250 OP 636 PS 637: Performed by: NURSE PRACTITIONER

## 2022-12-06 PROCEDURE — 82248 BILIRUBIN DIRECT: CPT | Performed by: NURSE PRACTITIONER

## 2022-12-06 PROCEDURE — 99238 HOSP IP/OBS DSCHRG MGMT 30/<: CPT | Mod: 24 | Performed by: SURGERY

## 2022-12-06 RX ORDER — TACROLIMUS 1 MG/1
6 CAPSULE ORAL
Qty: 360 CAPSULE | Refills: 11 | Status: SHIPPED | OUTPATIENT
Start: 2022-12-06 | End: 2022-12-13

## 2022-12-06 RX ORDER — DAPSONE 25 MG/1
50 TABLET ORAL DAILY
Qty: 60 TABLET | Refills: 4 | Status: SHIPPED | OUTPATIENT
Start: 2022-12-06 | End: 2022-12-13

## 2022-12-06 RX ORDER — ONDANSETRON 8 MG/1
8 TABLET, ORALLY DISINTEGRATING ORAL EVERY 8 HOURS PRN
Qty: 30 TABLET | Refills: 0 | Status: SHIPPED | OUTPATIENT
Start: 2022-12-06

## 2022-12-06 RX ORDER — RIZATRIPTAN BENZOATE 10 MG/1
10 TABLET ORAL
Status: COMPLETED | OUTPATIENT
Start: 2022-12-06 | End: 2022-12-06

## 2022-12-06 RX ORDER — TOPIRAMATE 25 MG/1
75 TABLET, FILM COATED ORAL AT BEDTIME
Status: DISCONTINUED | OUTPATIENT
Start: 2022-12-06 | End: 2022-12-06 | Stop reason: HOSPADM

## 2022-12-06 RX ORDER — CYCLOBENZAPRINE HCL 5 MG
10 TABLET ORAL EVERY 8 HOURS PRN
Status: DISCONTINUED | OUTPATIENT
Start: 2022-12-06 | End: 2022-12-06 | Stop reason: HOSPADM

## 2022-12-06 RX ORDER — MYCOPHENOLATE MOFETIL 250 MG/1
750 CAPSULE ORAL
Qty: 180 CAPSULE | Refills: 11 | Status: SHIPPED | OUTPATIENT
Start: 2022-12-06 | End: 2022-12-13

## 2022-12-06 RX ORDER — TOPIRAMATE 25 MG/1
75 TABLET, FILM COATED ORAL AT BEDTIME
Qty: 90 TABLET | Refills: 2 | Status: SHIPPED | OUTPATIENT
Start: 2022-12-06 | End: 2023-02-07

## 2022-12-06 RX ORDER — ACETAMINOPHEN 325 MG/1
975 TABLET ORAL EVERY 8 HOURS PRN
Status: DISCONTINUED | OUTPATIENT
Start: 2022-12-06 | End: 2022-12-06 | Stop reason: HOSPADM

## 2022-12-06 RX ORDER — CYCLOBENZAPRINE HCL 10 MG
10 TABLET ORAL EVERY 8 HOURS PRN
Qty: 30 TABLET | Refills: 0 | Status: SHIPPED | OUTPATIENT
Start: 2022-12-06 | End: 2023-01-13

## 2022-12-06 RX ORDER — ACETAMINOPHEN 325 MG/1
975 TABLET ORAL EVERY 8 HOURS PRN
Qty: 100 TABLET | Refills: 0 | Status: SHIPPED | OUTPATIENT
Start: 2022-12-06 | End: 2022-12-12

## 2022-12-06 RX ADMIN — FOLIC ACID 1 MG: 1 TABLET ORAL at 08:20

## 2022-12-06 RX ADMIN — GABAPENTIN 200 MG: 100 CAPSULE ORAL at 08:20

## 2022-12-06 RX ADMIN — ESCITALOPRAM OXALATE 20 MG: 10 TABLET ORAL at 08:20

## 2022-12-06 RX ADMIN — PROCHLORPERAZINE EDISYLATE 10 MG: 5 INJECTION INTRAMUSCULAR; INTRAVENOUS at 10:50

## 2022-12-06 RX ADMIN — TACROLIMUS 6 MG: 5 CAPSULE ORAL at 08:22

## 2022-12-06 RX ADMIN — INSULIN GLARGINE 12 UNITS: 100 INJECTION, SOLUTION SUBCUTANEOUS at 08:21

## 2022-12-06 RX ADMIN — PANCRELIPASE 2 CAPSULE: 24000; 76000; 120000 CAPSULE, DELAYED RELEASE PELLETS ORAL at 16:26

## 2022-12-06 RX ADMIN — TOPIRAMATE 50 MG: 25 TABLET, FILM COATED ORAL at 08:22

## 2022-12-06 RX ADMIN — MYCOPHENOLATE MOFETIL 750 MG: 250 CAPSULE ORAL at 08:22

## 2022-12-06 RX ADMIN — DAPSONE 50 MG: 25 TABLET ORAL at 08:20

## 2022-12-06 RX ADMIN — LEVOTHYROXINE SODIUM 150 MCG: 0.07 TABLET ORAL at 08:20

## 2022-12-06 RX ADMIN — RIZATRIPTAN BENZOATE 10 MG: 10 TABLET ORAL at 09:51

## 2022-12-06 RX ADMIN — SITAGLIPTIN 100 MG: 100 TABLET, FILM COATED ORAL at 08:19

## 2022-12-06 RX ADMIN — ASPIRIN 81 MG CHEWABLE TABLET 81 MG: 81 TABLET CHEWABLE at 08:20

## 2022-12-06 RX ADMIN — GABAPENTIN 200 MG: 100 CAPSULE ORAL at 13:36

## 2022-12-06 RX ADMIN — PANCRELIPASE 2 CAPSULE: 24000; 76000; 120000 CAPSULE, DELAYED RELEASE PELLETS ORAL at 11:33

## 2022-12-06 RX ADMIN — POLYETHYLENE GLYCOL 3350 17 G: 17 POWDER, FOR SOLUTION ORAL at 08:19

## 2022-12-06 RX ADMIN — ONDANSETRON 8 MG: 4 TABLET, ORALLY DISINTEGRATING ORAL at 09:51

## 2022-12-06 RX ADMIN — VALGANCICLOVIR HYDROCHLORIDE 450 MG: 450 TABLET ORAL at 08:21

## 2022-12-06 RX ADMIN — PANTOPRAZOLE SODIUM 40 MG: 40 TABLET, DELAYED RELEASE ORAL at 08:20

## 2022-12-06 ASSESSMENT — ACTIVITIES OF DAILY LIVING (ADL)
ADLS_ACUITY_SCORE: 28
ADLS_ACUITY_SCORE: 28
ADLS_ACUITY_SCORE: 39
ADLS_ACUITY_SCORE: 28
DRESSING/BATHING_DIFFICULTY: NO
ADLS_ACUITY_SCORE: 28
VISION_MANAGEMENT: GLASSES
CONCENTRATING,_REMEMBERING_OR_MAKING_DECISIONS_DIFFICULTY: NO
DOING_ERRANDS_INDEPENDENTLY_DIFFICULTY: NO
CHANGE_IN_FUNCTIONAL_STATUS_SINCE_ONSET_OF_CURRENT_ILLNESS/INJURY: NO
FALL_HISTORY_WITHIN_LAST_SIX_MONTHS: NO
TOILETING_ISSUES: NO
DEPENDENT_IADLS:: INDEPENDENT
ADLS_ACUITY_SCORE: 28
ADLS_ACUITY_SCORE: 28
WALKING_OR_CLIMBING_STAIRS_DIFFICULTY: NO
ADLS_ACUITY_SCORE: 28
ADLS_ACUITY_SCORE: 28
DIFFICULTY_EATING/SWALLOWING: NO
WEAR_GLASSES_OR_BLIND: YES

## 2022-12-06 NOTE — PROVIDER NOTIFICATION
7A 205 Susan Puckett- Liver   Pt wants order for rizatriptan 10 mg. Can this be ordered prn for her migraines. Thank you   Sharon   [phone]    Provider will order 1x PRN dose for tonight.

## 2022-12-06 NOTE — PLAN OF CARE
"/83 (BP Location: Left arm)   Pulse 71   Temp 98  F (36.7  C) (Oral)   Resp 16   Ht 1.676 m (5' 6\")   Wt 78.6 kg (173 lb 3.2 oz)   SpO2 96%   BMI 27.96 kg/m      2158-8903  VSS on RA. A+Ox4. ACHS with carb coverage, 138 this morning. Pt wears dexcom. Lantus given this am. Plan for oncoming nurse to give lunch creon and carb coverage as pt has not ordered lunch yet. Headache treated with maxalt x1. Zofran x1 (with no relief) and compazine x1 for nausea. Regular diet, fair appetite. 1 BM this shift, miralax given. Voiding WOD. Healed abdominal incision. Up ad adriano, pt ambulated in halls. PT ordered for pt. OT worked with pt today, compression sleeves on BLE. Writer updated  this shift. Plan to discharge later this afternoon. AVS printed and medications ready to be picked up in discharge pharmacy. Will continue to monitor and notify team with changes.                "

## 2022-12-06 NOTE — DISCHARGE SUMMARY
[facility]    Discharge Summary  Transplant Surgery    Date of Admission:  12/4/2022  Date of Discharge:  12/6/2022  Discharging Provider: Bhavana Romo NP / Bernice Dunham MD     Discharge Diagnoses   Principal Problem:    Generalized abdominal pain  Active Problems:    Hypothyroidism    Other headache syndrome    Nausea    Major depressive disorder, recurrent episode, moderate (H)    Type 2 diabetes mellitus without complication, without long-term current use of insulin (H)    Hypomagnesemia    Immunosuppressed status (H)    Anemia in other chronic diseases classified elsewhere    Seizure due to hypoglycemia (H)    Moderate malnutrition (H)    Pancreatic insufficiency    Status post liver transplantation (H)    Bilateral leg pain    Bilateral lower extremity edema    Vomiting and diarrhea    Leukopenia    History of Present Illness   Susan Puckett is a 52 year old female who is s/p DDLT on 11/2/22 for 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 Post-transplant course complicated by renal insufficiency, moderate malnutrition, steroid-induced hyperglycemia, and hypervolemia. She presents with nausea/vomiting and migraines.    Hospital Course   s/p DDLT 11/2/22: AST/ALT/Tbili normal; Alk phos mildly elevated on admission. CT w/ contrast with postoperative changes of liver vs ?hepatitis vs. ?acute rejection. US patent, GGT mildly elevated. No intervention needed, Alk phos now trending down 109 today.     Immunosuppression management:  Maintenance:  -Tacrolimus 6 mg BID. Tacrolimus goal level 8-12 (closer to 8 with migraines per Dr. Dunham).   -Mycophenolate 750 mg BID  Infection prophylaxis: viral (Valcyte x 6 months), pneumocystis (Dapsone x 6 months)    Transplant coordinator: Edwina Fallon [phone].  Biliary stent:  No  Donor status:  DBD  CMV D - / R  +  EBV D + / R +  Anticoagulation plan: ASA 81 mg daily x 6 months    Neurology/Psych:  Acute bilateral leg pain; Right thigh numbness: PTA gabapentin 200 mg BID increased to TID. Avoid tight belts/clothing/heavy weights along waist line.   Hx Hypoglycemic seizures: Per neurology, increase Topamax to 75 mg daily in the evening (causes brain fog in the morning).   Hx Depression: Continue PTA Lexapro, Trazadone.   Acute on chronic headaches: PTA on Rizatriptan PRN. Neurology consulted, and description more c/w cervicalgia or headache rather than migraine. PT consulted while inpatient to address neck tightness/pain. No need for MRI per neurology given long history of migraines prior to transplant, very low suspicion for PRES. Plan on discharge:    -Tacrolimus goal closer to 8 as above  -Heat application to head/neck encouraged  -Outpatient PT for head/neck tightness  -General neurology referral to Headache Clinic  -Alternative therapies (e.g. acupuncture, massage) can augment medical therapies  -For severe headaches at home:   1. Take rizatriptan   2. If this is not successful after 1-2 hrs, take Zofran + Tylenol, Zofran + Benadryl, or Tylenol + Benadryl before sleep   3. If patient calls PCP or presents to ED for intractable headaches, can get Medrol dose pack.      Hematology:   Leukopenia: Likely r/t medications. WBC 2-3. Monitor.  Acute blood loss/Anemia of chronic disease: Hgb stable ~8.     GI/Nutrition:  History of gastric bypass; Moderate malnutrition in the context of acute on chronic illness: Malnutrition diagnosed on previous admission. Continue regular diet, SAMI multivitamin.   Hx Pancreatic insufficiency: Continue PTA Creon.  Nausea/Vomiting/Constipation: Aggressive bowel regimen given constipation on CT, had BM overnight. Nausea related to headaches relieved with PRN antiemetics. Resolved.     Endocrine:   DM type 2: Continue PTA regimen of Lantus, sliding scale Novolog, carb coverage.   Hx  Hypothyroidism: PTA Synthroid.     Fluid/Electrolytes:   Hypomagnesemia: Continue PTA Mag-Ox.  Hypervolemia in the setting of hypoalbuminemia; BLE edema: Ongoing issue. Last Echo 11/19 with normal EF. Lymphedema team consulted, compression stockings applied.      Discharge Disposition   Discharged to home   Condition at discharge: Stable    Pending Results   These results will be followed up by transplant team  Unresulted Labs Ordered in the Past 30 Days of this Admission     Date and Time Order Name Status Description    12/4/2022 10:45 AM Blood Culture Hand, Left Preliminary     12/4/2022 10:45 AM Blood Culture Hand, Right Preliminary     11/1/2022 10:13 PM Prepare plasma (unit) Preliminary     11/1/2022 10:13 PM Prepare plasma (unit) Preliminary     11/1/2022 10:13 PM Prepare plasma (unit) Preliminary     11/1/2022 10:13 PM Prepare red blood cells (unit) Preliminary     11/1/2022 10:13 PM Prepare red blood cells (unit) Preliminary     11/1/2022 10:13 PM Prepare red blood cells (unit) Preliminary     11/1/2022 10:13 PM Prepare red blood cells (unit) Preliminary     11/1/2022 10:13 PM Prepare red blood cells (unit) Preliminary         Final pathology results: No pathology submitted  Primary Care Physician   Harleen Billy    Physical Exam   Temp: 98  F (36.7  C) Temp src: Oral BP: 130/83 Pulse: 71   Resp: 16 SpO2: 96 % O2 Device: None (Room air)    Vitals:    12/04/22 0343 12/05/22 0748 12/06/22 0638   Weight: 80.3 kg (177 lb) 77 kg (169 lb 12.1 oz) 78.6 kg (173 lb 3.2 oz)     Vital Signs with Ranges  Temp:  [97.6  F (36.4  C)-98  F (36.7  C)] 98  F (36.7  C)  Pulse:  [59-71] 71  Resp:  [16] 16  BP: ()/(59-83) 130/83  SpO2:  [95 %-98 %] 96 %  I/O last 3 completed shifts:  In: 1420 [P.O.:920; I.V.:500]  Out: 801 [Urine:800; Stool:1]    Constitutional: resting comfortably in bed  Eyes: EOMI  Respiratory: unlabored on RA  Cardiovascular: perfused  GI: abdomen soft, non-distended. Incision healing well.    Genitourinary: no Rossi present  Skin: warm, dry  Musculoskeletal: no edema noted  Neurologic: A&Ox4  Neuropsychiatric: behavior appropriate to situation     Consultations This Hospital Stay   NURSING TO CONSULT FOR VASCULAR ACCESS CARE IP CONSULT  TRANSPLANT SURGERY LIVER ADULT IP CONSULT  NEUROSURGERY ADULT IP CONSULT  NEUROLOGY GENERAL ADULT IP CONSULT  PHYSICAL THERAPY ADULT IP CONSULT  LYMPHEDEMA THERAPY IP CONSULT  CARE MANAGEMENT / SOCIAL WORK IP CONSULT  NURSING TO CONSULT FOR VASCULAR ACCESS CARE IP CONSULT    Time Spent on this Encounter   I have spent greater than 30 minutes on this discharge.    Discharge Orders   Discharge Medications   Current Discharge Medication List      START taking these medications    Details   cyclobenzaprine (FLEXERIL) 10 MG tablet Take 1 tablet (10 mg) by mouth every 8 hours as needed for muscle spasms  Qty: 30 tablet, Refills: 0    Associated Diagnoses: Other headache syndrome         CONTINUE these medications which have CHANGED    Details   acetaminophen (TYLENOL) 325 MG tablet Take 3 tablets (975 mg) by mouth every 8 hours as needed for mild pain  Qty: 100 tablet, Refills: 0    Associated Diagnoses: Liver transplanted (H)      insulin glargine (LANTUS PEN) 100 UNIT/ML pen Inject 11 Units Subcutaneous every morning (before breakfast)    Comments: If Lantus is not covered by insurance, may substitute Basaglar or Semglee or other insulin glargine product per insurance preference at same dose and frequency.    Associated Diagnoses: Type 2 diabetes mellitus without complication, without long-term current use of insulin (H)      ondansetron (ZOFRAN ODT) 8 MG ODT tab Take 1 tablet (8 mg) by mouth every 8 hours as needed for nausea  Qty: 30 tablet, Refills: 0    Associated Diagnoses: Other headache syndrome      topiramate (TOPAMAX) 25 MG tablet Take 3 tablets (75 mg) by mouth At Bedtime  Qty: 90 tablet, Refills: 2    Associated Diagnoses: Seizure due to hypoglycemia (H)          CONTINUE these medications which have NOT CHANGED    Details   !! amylase-lipase-protease (CREON 24) 78459-84916 units CPEP per EC capsule Take 2 capsules by mouth 3 times daily (with meals)  Qty: 180 capsule, Refills: 3    Associated Diagnoses: Pancreatic insufficiency      !! amylase-lipase-protease (CREON 24) 26651-29901 units CPEP per EC capsule Take 1 capsule by mouth Take with snacks or supplements (with snacks)  Qty: 90 capsule, Refills: 3    Associated Diagnoses: History of gastric bypass      aspirin (ASA) 81 MG chewable tablet Take 1 tablet (81 mg) by mouth daily  Qty: 90 tablet, Refills: 3    Associated Diagnoses: Liver replaced by transplant (H)      calcium carbonate-vitamin D (OSCAL) 500-5 MG-MCG tablet Take 1 tablet by mouth 2 times daily      cyanocobalamin (VITAMIN B-12) 1000 MCG tablet Take 1,000 mcg by mouth every 7 days Take 1 tablet by mouth every 7 days on Wednesdays      dapsone (ACZONE) 25 MG tablet Take 2 tablets (50 mg) by mouth daily  Qty: 60 tablet, Refills: 0    Associated Diagnoses: Liver replaced by transplant (H)      escitalopram (LEXAPRO) 20 MG tablet Take 10 mg by mouth daily      folic acid (FOLVITE) 1 MG tablet Take 1 mg by mouth every morning      gabapentin (NEURONTIN) 100 MG capsule Take 2 capsules (200 mg) by mouth 3 times daily  Qty: 90 capsule, Refills: 3    Associated Diagnoses: Peripheral neuropathy      insulin aspart (NOVOLOG VIAL) 100 UNITS/ML vial Inject 1-10 Units Subcutaneous 4 times daily (with meals and nightly)    Comments: 0.5 unit(s) per 14g cho AC breakfast/lunch AND 0.5 unit(s) per 20 g cho for dinner AND NO meal insulin after 2000 hrs.  Snack coverage:   0800 - 1700 - 0.5 unit(s) per 14 g cho and 1701 - 2000 0.5 unit(s) per 20 g cho  Associated Diagnoses: Type 2 diabetes mellitus without complication, without long-term current use of insulin (H)      levothyroxine (SYNTHROID/LEVOTHROID) 150 MCG tablet Take 1 tablet by mouth daily      LORazepam (ATIVAN)  0.5 MG tablet Take 0.5 mg by mouth nightly as needed For anxiety      magnesium oxide (MAG-OX) 400 MG tablet Take 1 tablet (400 mg) by mouth 3 times daily  Qty: 90 tablet, Refills: 11    Associated Diagnoses: Liver replaced by transplant (H); Hypomagnesemia      melatonin 5 MG tablet Take 5 mg by mouth At Bedtime      multivitamin CF FORMULA (DEKAS PLUS) capsule Take 1 capsule by mouth daily  Qty: 90 capsule, Refills: 3    Associated Diagnoses: Cirrhosis of liver without ascites, unspecified hepatic cirrhosis type (H)      mycophenolate (GENERIC EQUIVALENT) 250 MG capsule Take 3 capsules (750 mg) by mouth two times daily  Qty: 540 capsule, Refills: 1    Comments: TXP DT 11/2/2022 (Liver) TXP Dischg DT 11/10/2022 DX Liver replaced by transplant Z94.4 TX Center [facility] ([place], [place])  Associated Diagnoses: Liver replaced by transplant (H)      polyethylene glycol (MIRALAX) 17 GM/Dose powder Take 17 g by mouth daily  Qty: 510 g    Associated Diagnoses: Liver replaced by transplant (H)      rizatriptan (MAXALT) 10 MG tablet Take 10 mg by mouth as needed Take 1 Tablet (10 mg) by mouth as needed for Headache. at onset of migraine. May repeat in 2 hr if needed up to 30mg in 24 hr & MAX use 5 days/month. Do not use within 24 hours of an ergotamine preparation or a different triptan.      simethicone (MYLICON) 80 MG chewable tablet Take 1 tablet (80 mg) by mouth every 6 hours as needed for cramping  Qty: 120 tablet, Refills: 0    Associated Diagnoses: Abdominal bloating      sitagliptin (JANUVIA) 100 MG tablet Take 1 tablet (100 mg) by mouth daily  Qty: 30 tablet, Refills: 0    Associated Diagnoses: Type 2 diabetes mellitus without complication, without long-term current use of insulin (H)      tacrolimus (GENERIC EQUIVALENT) 1 MG capsule Take 6 capsules (6 mg) by mouth two times daily  Qty: 360 capsule, Refills: 3    Comments: TXP DT 11/2/2022 (Liver) TXP Dischg DT 11/10/2022 DX  Liver replaced by transplant Z94.4 TX Center [facility] [place] ([place], [place])  Associated Diagnoses: Liver replaced by transplant (H)      traZODone (DESYREL) 50 MG tablet Take 50 mg by mouth At Bedtime      valGANciclovir (VALCYTE) 450 MG tablet Take 1 tablet (450 mg) by mouth daily  Qty: 90 tablet, Refills: 3    Associated Diagnoses: Liver replaced by transplant (H)      capsaicin 0.035 % CREA Externally apply topically 3 times daily      Continuous Blood Gluc  (DEXCOM G6 ) LUNA Use as directed for continuous glucose monitoring.      Continuous Blood Gluc Sensor (DEXCOM G6 SENSOR) MISC Use as directed for continuous glucose monitoring.  Change sensor every 10 days.      Continuous Blood Gluc Transmit (DEXCOM G6 TRANSMITTER) MISC Use as directed for continuous glucose monitoring.  Change every 3 months.      insulin pen needle (BD GRISEL U/F) 32G X 4 MM miscellaneous Inject 1 each Subcutaneous       !! - Potential duplicate medications found. Please discuss with provider.      STOP taking these medications       bumetanide (BUMEX) 1 MG tablet Comments:   Reason for Stopping:         methocarbamol (ROBAXIN) 500 MG tablet Comments:   Reason for Stopping:         pantoprazole (PROTONIX) 40 MG EC tablet Comments:   Reason for Stopping:                  Physical Therapy Referral      Adult Neurology  Referral      Reason for your hospital stay    You were admitted for nausea, vomiting, and headaches. You were seen by neurology and your headaches are now improving. You are discharging home in stable condition with close follow up.     Activity    Your activity upon discharge: Walk at least four times a day, lift no greater than 10 pounds for 8 weeks from the time of surgery.  No driving while taking narcotics or 3 weeks after surgery.     Adult [facility] Follow-up and recommended labs and tests    Resume previous lab schedule.    Follow up with General Neurology in  Headache clinic.  Follow up with Dr. Morgan as scheduled 12/8/22.     When to contact your care team    WHEN TO CONTACT YOUR  COORDINATOR:     Transplant Coordinator [phone]     Notify your coordinator if you have pain over your liver, increased redness or drainage from your incision, fever greater than 100F, or yellowing of skin or eyes.     Notify your coordinator immediately if you are ever unable to take your immunosuppressive medications for any reason.     If you have URGENT concerns after office hours, please call the hospital switchboard at [phone] and ask to have the organ transplant nurse on-call paged. If you have a life-threatening emergency, go to the nearest emergency room.     Discharge Instructions    Leg numbness: Avoid tight belts/clothing/heavy weights along waist line.    Headache management:   -Heat application to head/neck encouraged  -General neurology referral to Headache Clinic  -Alternative therapies (e.g. acupuncture, massage) can augment medical therapies  -Outpatient PT for neck tightness  -For severe headaches at home:   1. Take rizatriptan   2. If this is not successful after 1-2 hrs, take Zofran + Tylenol, Zofran + Benadryl, or Tylenol + Benadryl before sleep     Diet    Diet recommendations post-transplant: Heart healthy dietary habits long term (low saturated/trans fat, low sodium). High protein diet x 8 weeks. Practice food safety precautions.         Data   Most Recent 3 CBC's:  Recent Labs   Lab Test 12/06/22  0630 12/05/22  0553 12/04/22  0448   WBC 2.4* 2.1* 3.6*   HGB 7.6* 8.0* 9.4*   MCV 99 101* 98    159 200      Most Recent 3 BMP's:  Recent Labs   Lab Test 12/06/22  0825 12/06/22  0630 12/05/22  2142 12/05/22  0737 12/05/22  0553 12/04/22  1807 12/04/22  0448   NA  --  139  --   --  136  --  139   POTASSIUM  --  4.8  --   --  4.8  --  5.3   CHLORIDE  --  110*  --   --  108*  --  107   CO2  --  18*  --   --  19*  --  20*   BUN  --  17.9  --   --  14.7  --   16.3   CR  --  1.19*  --   --  1.09*  --  1.25*   ANIONGAP  --  11  --   --  9  --  12   MAURI  --  8.1*  --   --  8.4*  --  9.0   * 89 157*   < > 162*   < > 120*    < > = values in this interval not displayed.     Most Recent 2 LFT's:  Recent Labs   Lab Test 12/06/22  0630 12/05/22  0553   AST 15 19   ALT <5* <5*   ALKPHOS 109* 127*   BILITOTAL 0.3 0.3     Most Recent INR's and Anticoagulation Dosing History:  Anticoagulation Dose History     Recent Dosing and Labs Latest Ref Rng & Units 11/2/2022 11/2/2022 11/2/2022 11/2/2022 11/3/2022 11/3/2022 11/4/2022    INR 0.85 - 1.15 1.94(H) 1.88(H) 1.71(H) 1.58(H) 1.43(H) 1.33(H) 1.32(H)        Most Recent 3 Troponin's:  Recent Labs   Lab Test 11/22/20  1406   TROPI <0.015     Most Recent Cholesterol Panel:  Recent Labs   Lab Test 10/19/20  0725   TRIG 53     Most Recent 6 Bacteria Isolates From Any Culture (See EPIC Reports for Culture Details):  Recent Labs   Lab Test 11/22/20 1957 11/22/20 1952 11/22/20  1727 10/25/20  1315   CULT No growth No growth 10,000 to 50,000 colonies/mL  mixed urogenital durga  Susceptibility testing not routinely done   >100,000 colonies/mL  Escherichia coli  *     Most Recent TSH, T4 and A1c Labs:  Recent Labs   Lab Test 12/01/22  0852 11/02/22  1450   TSH 0.29*  --    T4 1.30  --    A1C  --  5.7*     Results for orders placed or performed during the hospital encounter of 12/04/22   CT Abdomen Pelvis w Contrast    Narrative    EXAMINATION: CT ABDOMEN PELVIS W CONTRAST  12/4/2022 8:14 AM      CLINICAL HISTORY: vomiting, abdominal pain; 1 month ago liver  transplant, distant nurys-en-y    COMPARISON: 1/27/2022, 12/13/2021    PROCEDURE COMMENTS: CT of the abdomen was performed with 108 cc  Isovue-370 intravenous contrast. Coronal and sagittal reformatted  images were obtained.    FINDINGS:  Lower thorax:   Bibasilar atelectasis. Minimal distal paraesophageal fat stranding.    Abdomen and pelvis:  Surgical changes of liver transplant.  Diffuse periportal edema. No  overt biliary ductal dilatation. Cholecystectomy. Markedly atrophic  pancreas. Increased prominence of large mary hepatis lymph node  measuring 3.4 x 2.3 cm in axial dimension (series 3 image 64),  previously measuring up to 2.6 cm on 1/27/2022 exam. Additional  subadjacent or habitus lymph node measuring up to 1.4 cm in short  axis. Stable splenomegaly. Post surgical changes of Faustino-en-Y gastric  bypass. Normal adrenal glands and kidneys. Normal ureters and urinary  bladder. Uterus within normal limits. Nabothian cyst.     No evidence of mechanical bowel obstruction. Colonic diverticulosis  without CT features of acute diverticulitis. Normal appendix. Small  amount of wall thickening about the splenic flexure colon likely  related to anasarca. Diffuse mesenteric stranding. No intra-abdominal  free air or ascites.     Osseous structures:   Diffuse soft tissue anasarca. No acute or aggressive osseous  normality..      Impression    IMPRESSION:  1. Post surgical changes of liver transplant with periportal edema, as  well as mesenteric stranding and reactive lymphadenopathy about the  mary hepatis. Findings are nonspecific but may be seen with acute  hepatitis versus early transplant rejection. Lower index of suspicion  for cholestatic process.  2. Diffuse features of fluid overload including soft tissue anasarca  and mesenteric edema.  3. Post surgical changes of Faustino-en-Y gastric bypass, no bowel  obstruction.  4. Additional chronic incidental findings as above.    I have personally reviewed the examination and initial interpretation  and I agree with the findings.    KHAI LAUREN MD         SYSTEM ID:  [id number]   US Liver Transplant    Narrative    Liver transplant ultrasound    INDICATION: Post liver transplant, evaluate Doppler for vascular  patency    COMPARISON: 11/19/2022    FINDINGS: Grayscale images of the transplanted liver appears  unremarkable. Left in the lower pole  not well visualized. The included  upper and midpole regions appear unremarkable. Right kidney appears  grossly normal measuring 10.6 cm in length. Spleen is elongated at  14.5 cm with no visible mass. Pancreas was partially scattered by  bowel gas at the tail. The head and body regions appear unremarkable.  Small amount of fluid at the falciform ligament of the transplant  liver. Common bile duct caliber normal at 3 mm. Associated absence of  gallbladder.    Doppler assessment shows antegrade flows of all vessels with  velocities in centimeters per second as follows:  Splenic vein 34  Extrahepatic portal vein 34  Portable vein at anastomosis 31  Intrahepatic portal vein 28  Right portal vein 43  Left portal vein 22  IV see above anastomosis 109  IVC at anastomosis 67  Intrahepatic IVC 29  Extrahepatic IVC 24.  Right, middle and left hepatic veins 27-36  Main hepatic artery 41 with resistive index 0.83  Left hepatic artery 34 with resistive index 0.73  Right hepatic artery 36 with resistive index 0.72.  IVC at the inferior anastomosis was not assessed.      Impression    IMPRESSION:  1. Liver transplant with small amount of fluid at the falciform  ligament.  2. Splenomegaly.  3. Patency demonstrated overall relevant vessels. Slightly elevated  resistive index of the main hepatic artery which could indicate very  mild narrowing.    KHAI LAUREN MD         SYSTEM ID:  [id number]

## 2022-12-06 NOTE — TELEPHONE ENCOUNTER
Susan called saying that [facility] was having issues filling her medications she requested.

## 2022-12-06 NOTE — PLAN OF CARE
"/70 (BP Location: Right arm)   Pulse 59   Temp 97.6  F (36.4  C) (Oral)   Resp 16   Ht 1.676 m (5' 6\")   Wt 77 kg (169 lb 12.1 oz)   SpO2 98%   BMI 27.40 kg/m      [id number]. VSS on RA, afebrile. Denies pain or nausea. Carb coverage with meals. Regular diet with fair appetite. PIV SL. LBM 12/4, voiding independently. Lymph wraps on bilaterally. Patient UAL, ambulated in hallway today with . Continue with plan of care and update team with any changes.   "

## 2022-12-06 NOTE — PROGRESS NOTES
"[facility]  Neurology Consultation - Progress Note    Patient Name:  Susan Puckett  Date of Service:  December 6, 2022    Subjective:    No acute events overnight. Per nursing notes, pt requested a dose of rizatriptan last night, but did not end up receiving it. [place] much better yesterday after the migraine cocktail, but it made her sleep all day. Today feels like another headache is starting. In the right frontotemporal area, 5/10 in severity. Since her surgery, she has to force herself to eat. Doesn't have much appetite. Remains hemodynamically stable.     Objective:    Vitals: /65 (BP Location: Left arm)   Pulse 66   Temp 98  F (36.7  C) (Oral)   Resp 16   Ht 1.676 m (5' 6\")   Wt 78.6 kg (173 lb 3.2 oz)   SpO2 95%   BMI 27.96 kg/m    General: Lying in bed, NAD  Head: Atraumatic, normocephalic   Cardiac: well perfused  Neurologic:  Fully alert, attentive and oriented. Speech clear and fluent. Decreased wrinkling in lower forehead/between eyebrows. Mild ptosis bialterally (baseline). Decreased sensation over right lateral thigh. Hip flexion, hand  5/5 bilaterally.     Pertinent Investigations:    I have personally reviewed most recent and pertinent labs, tests, and radiological images.     Assessment  Ms. 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 Neurology was consulted for headache and right thigh numbness.      #Headache   Likely tension with migranous features versus primary migraine disorder. Suspect there is a component of medication overuse contributing to the severity of the headache. Pt has been using tylenol multiple times per day over the last few weeks, and this " has continued in the hospital. Reassuring that headache is same type and location after transplant compared to before transplant. At this point there is no concern for PRES given her history of migraines prior to the transplant, lack of focal neuro deficits, and expected improvement with typical headache/migraine treatments. This headache episode is a result of chronic migraines, neck tension, and medication (tylenol) overuse.   - Please discontinue scheduled tylenol, and avoid scheduled tylenol/NSAIDS  - Inpatient Physical therapy consult to address neck tightness/pain   - Heat pack to the neck PRN     Discharge headache plan:  - Increase topiramate to 75mg daily in the evening (causes the pt brain fog in the morning)  - Place General Neurology Referral, specify Headache Clinic, at discharge  - Alternative therapies, such as massage and acupuncture, are acceptable from a Neurology standpoint and can augment medical therapies  - For severe headaches at home:     1- Take rizatriptan     2- If this is not successful after 1-2 hours, take zofran + tylenol, zofran + benadryl, or tylenol + benadryl before sleep     3- If pt calls PCP or presents to the ED for intractable headaches, she can get a Medrol dose pack to break the headache. However, this should be cleared by transplant team first.      #Right thigh numbness  Numbness and mild + sensory phenomenon in the right lateral femoral cutaneous nerve is most c/w meralgia paresthetica. This is a benign neuropathy associated with weight gain and tight fitting clothing. This can also be associated with positioning in the OR. It is expected to improve. Patient was counseled regarding prognosis, and likelihood that it will improve over the course of months.   - Continue gabapentin as scheduled   - Avoid tight belts/clothing/heavy weights along waist line.     Patient is ok to discharge from a Neurology standpoint. Neurology will sign off at this time.     Thank you for  involving Neurology in the care of Susan Puckett.  Please do not hesitate to call with questions/concerns (consult pager [phone]).       Patient was discussed with Dr. Cruz.      Lacy Fu MD  [facility]  Medicine-Pediatrics, PGY-4

## 2022-12-06 NOTE — PLAN OF CARE
"Goal Outcome Evaluation:       BP 97/59 (BP Location: Left arm)   Pulse 64   Temp 97.6  F (36.4  C) (Oral)   Resp 16   Ht 1.676 m (5' 6\")   Wt 77 kg (169 lb 12.1 oz)   SpO2 96%   BMI 27.40 kg/m      Shift:7005-2711  VS: Vitals stable, afebrile  Neuro: Alert and oriented x4   Behaviors: Calm and cooperative, calls appropriately   BG: ACHS, no sliding scale, carb coverage, pt wears dexcom    Respiratory: WDL, on room air   Cardiac: WDL  Pain/Nausea: Denies pain or nausea, declined scheduled tylenol   Diet: Regular diet, carb coverage with meals   IV Access: Peripheral IV saline locked   GI/: BM 12/5, voiding independently, saving in hat   Skin: Healed abdominal incision   Mobility: Up ab adriano in room, lymph wraps on legs bilaterally   Plan: Continue with POC and notify team with any changes                  "

## 2022-12-06 NOTE — PLAN OF CARE
DISCHARGE:  D: Patient with orders to discharge to home.   I: Discharge instructions, medications, & follow ups reviewed with patient and spouse. Copy of discharge summary given to patient.  Right PIV removed. All belongings packed & sent with patient. Medications filled & picked up at discharge pharmacy.   A: Patient in stable condition. AVSS. Patient and spouse had no further questions regarding discharge instructions and medications. Patient transferred out by wheelchair & left with spouse.   P: Plan for patient to attend outpatient PT and scheduled follow up appointments.

## 2022-12-06 NOTE — CONSULTS
Care Management Initial Consult    General Information  Assessment completed with: Patient,  (Susan)  Type of CM/SW Visit: Initial Assessment    Primary Care Provider verified and updated as needed: Yes   Readmission within the last 30 days: current reason for admission unrelated to previous admission      Reason for Consult: discharge planning  Advance Care Planning:            Communication Assessment  Patient's communication style: spoken language (English or Bilingual)    Hearing Difficulty or Deaf: no   Wear Glasses or Blind: yes    Cognitive  Cognitive/Neuro/Behavioral: WDL                      Living Environment:   People in home: spouse   (Roly)  Current living Arrangements: house      Able to return to prior arrangements: yes       Family/Social Support:  Care provided by: spouse/significant other, self  Provides care for: no one  Marital Status:   , Children, Other (specify)   (Roly)       Description of Support System: Supportive    Support Assessment: Adequate family and caregiver support    Current Resources:   Patient receiving home care services: No     Community Resources: None  Equipment currently used at home: none  Supplies currently used at home: None    Employment/Financial:  Employment Status: disabled        Financial Concerns: No concerns identified   Referral to Financial Worker: No       Lifestyle & Psychosocial Needs:  Social Determinants of Health     Tobacco Use: Low Risk      Smoking Tobacco Use: Never     Smokeless Tobacco Use: Never     Passive Exposure: Not on file   Alcohol Use: Not on file   Financial Resource Strain: Not on file   Food Insecurity: Not on file   Transportation Needs: Not on file   Physical Activity: Not on file   Stress: Not on file   Social Connections: Not on file   Intimate Partner Violence: Not on file   Depression: At risk     PHQ-2 Score: 4   Housing Stability: Not on file       Functional Status:  Prior to admission patient needed assistance:    Dependent ADLs:: Independent  Dependent IADLs:: Independent       Mental Health Status:  Mental Health Status: Current Concern  - I discussed her mental health as it related to her readmission and recovery. She reported that she has not scheduled with Dr. Sancho Gaines yet. I encouraged her to do so.     Chemical Dependency Status:  Chemical Dependency Status: No Current Concerns             Values/Beliefs:  Spiritual, Cultural Beliefs, Sabianism Practices, Values that affect care:                 Additional Information:  Susan underwent a  donor liver transplant on 2022. I met with Susan at bedside to update her psychosocial assessment. She has been unable to get home care due to her insurance and availability at the home care agencies. Susan gravitated towards talking about everything that is going wrong. She provided examples with nursing and not being cared for. When asked further, she described instances when she was told by nursing that they will come back to provide meds etc. And they did within 10 minutes. Susan was unhappy with that answer from nursing. I provided supportive listening and also redirected conversation to focus on productive conversation. We talked about reestablishing care with Dr. Sancho Gaines for additional mental health support at home. I strongly recommended that she reestablishes care with Dr. Sancho Gaines. She is open to this and reported that she will call to schedule.     RNCC/SW to follow for discharge planning. No discharge needs identified at this time. Susan would like home care, however this is not a possibility at this time. She is aware of this.     RENARD Alvarado, [facility]  Liver Transplant   [facility]  Phone: [phone]  Pager: [phone]

## 2022-12-07 ENCOUNTER — TELEPHONE (OUTPATIENT)
Dept: TRANSPLANT | Facility: CLINIC | Age: 52
End: 2022-12-07

## 2022-12-07 NOTE — PLAN OF CARE
Occupational Therapy Discharge Summary    Reason for therapy discharge:    Discharged to home with outpatient therapy.    Progress towards therapy goal(s). See goals on Care Plan in [facility] electronic health record for goal details.  Goals met    Therapy recommendation(s):    Continued therapy is recommended.  Rationale/Recommendations:  OP lymph therapy and PT recommended to progress functional strength and endurance and to maintain BLE edema.

## 2022-12-07 NOTE — LETTER
OUTPATIENT OR TRANSITIONAL CARE  NEUROLOGY REFERRAL ORDER  [facility]      Patient Name: Susan Puckett  Transplant Date: 11/2/2022   YOB: 1970  Issue Date: 12/07/22   [facility] MR#:[id number]  Expiration Date: 12/07/23       Diagnoses: [x]      Liver Transplant (ICD-10 Z94.4)    [x]      Long term use of medications (ICD-10 Z79.899)     Please fax results to [phone]    REFERRAL        [x] Adult  Neurology Referral due to headaches      If you have questions, please call [phone] or [phone].          Delbert Morgan MD/PhD  Professor of Surgery  Chief, Division of Transplantation    Executive Medical Director  Solid Organ Transplant Service [facility]

## 2022-12-07 NOTE — LETTER
OUTPATIENT OR TRANSITIONAL CARE  PT/OT REFERRAL ORDER  [facility]  FAX:  [phone]    Patient Name: Susan Puckett  Transplant Date: 11/2/2022   YOB: 1970  Issue Date: 12/07/22   [facility] MR#:[id number]  Expiration Date: 12/07/22      Diagnoses: [x]      Liver Transplant (ICD-10 Z94.4)    [x]      Long term use of medications (ICD-10 Z79.899)     Please fax results to [phone]    REFERRAL        [x]   Adult PT/OT referral for physical deconditioning. History of liver transplant 11/2/2022.        If you have questions, please call transplant coordinator at [phone].          Delbert Morgan MD/PhD  Professor of Surgery  Chief, Division of Transplantation    Executive Medical Director  Solid Organ Transplant Service [facility]

## 2022-12-07 NOTE — TELEPHONE ENCOUNTER
"Susan sent MyC message stating:    \"[facility] still does not have my referrals for physical therapy for [facility] and also my new referral for neck therapy.   I called last week and nothing and forgot to call you guys back   I also cannot get into headache clinic until end of May for in person and that's several clinics and virtual end of April at 645 am which is crazy  as I will find it very hard to be alert. my appt. is end of April    if I can get in sooner with [facility] is that ok if I go with them ?\"      Spoke to Susan. I let her know I faxed the Neurology referral to her PCP at [facility] to see if she will be able to get Susan in to see Neurology sooner.     Patient requesting to go to [facility] for PT/OT services. [facility] still does not have referral. New referral sent to [facility], patient aware.      "

## 2022-12-08 ENCOUNTER — LAB (OUTPATIENT)
Dept: LAB | Facility: CLINIC | Age: 52
End: 2022-12-08
Attending: TRANSPLANT SURGERY
Payer: COMMERCIAL

## 2022-12-08 ENCOUNTER — OFFICE VISIT (OUTPATIENT)
Dept: TRANSPLANT | Facility: CLINIC | Age: 52
End: 2022-12-08
Attending: TRANSPLANT SURGERY
Payer: COMMERCIAL

## 2022-12-08 VITALS
SYSTOLIC BLOOD PRESSURE: 114 MMHG | WEIGHT: 180.1 LBS | OXYGEN SATURATION: 100 % | DIASTOLIC BLOOD PRESSURE: 78 MMHG | HEART RATE: 71 BPM | BODY MASS INDEX: 29.07 KG/M2

## 2022-12-08 DIAGNOSIS — D72.819 LEUKOPENIA, UNSPECIFIED TYPE: ICD-10-CM

## 2022-12-08 DIAGNOSIS — K75.81 LIVER CIRRHOSIS SECONDARY TO NASH (H): ICD-10-CM

## 2022-12-08 DIAGNOSIS — D72.819 LEUKOPENIA, UNSPECIFIED TYPE: Primary | ICD-10-CM

## 2022-12-08 DIAGNOSIS — Z94.4 LIVER REPLACED BY TRANSPLANT (H): ICD-10-CM

## 2022-12-08 DIAGNOSIS — K74.60 LIVER CIRRHOSIS SECONDARY TO NASH (H): ICD-10-CM

## 2022-12-08 LAB
AFP SERPL-MCNC: 1.8 NG/ML
ALBUMIN SERPL BCG-MCNC: 3.1 G/DL (ref 3.5–5.2)
ALP SERPL-CCNC: 109 U/L (ref 35–104)
ALT SERPL W P-5'-P-CCNC: <5 U/L (ref 10–35)
ANION GAP SERPL CALCULATED.3IONS-SCNC: 10 MMOL/L (ref 7–15)
AST SERPL W P-5'-P-CCNC: 16 U/L (ref 10–35)
BASOPHILS # BLD AUTO: 0 10E3/UL (ref 0–0.2)
BASOPHILS NFR BLD AUTO: 2 %
BILIRUB DIRECT SERPL-MCNC: <0.2 MG/DL (ref 0–0.3)
BILIRUB SERPL-MCNC: 0.4 MG/DL
BUN SERPL-MCNC: 16 MG/DL (ref 6–20)
CALCIUM SERPL-MCNC: 8.5 MG/DL (ref 8.6–10)
CHLORIDE SERPL-SCNC: 109 MMOL/L (ref 98–107)
CREAT SERPL-MCNC: 1.06 MG/DL (ref 0.51–0.95)
DEPRECATED HCO3 PLAS-SCNC: 20 MMOL/L (ref 22–29)
EOSINOPHIL # BLD AUTO: 0.2 10E3/UL (ref 0–0.7)
EOSINOPHIL NFR BLD AUTO: 6 %
ERYTHROCYTE [DISTWIDTH] IN BLOOD BY AUTOMATED COUNT: 15.9 % (ref 10–15)
GFR SERPL CREATININE-BSD FRML MDRD: 64 ML/MIN/1.73M2
GLUCOSE SERPL-MCNC: 228 MG/DL (ref 70–99)
HCT VFR BLD AUTO: 26.6 % (ref 35–47)
HGB BLD-MCNC: 8.3 G/DL (ref 11.7–15.7)
IMM GRANULOCYTES # BLD: 0 10E3/UL
IMM GRANULOCYTES NFR BLD: 0 %
LYMPHOCYTES # BLD AUTO: 0.7 10E3/UL (ref 0.8–5.3)
LYMPHOCYTES NFR BLD AUTO: 25 %
MAGNESIUM SERPL-MCNC: 1.7 MG/DL (ref 1.7–2.3)
MCH RBC QN AUTO: 30.3 PG (ref 26.5–33)
MCHC RBC AUTO-ENTMCNC: 31.2 G/DL (ref 31.5–36.5)
MCV RBC AUTO: 97 FL (ref 78–100)
MONOCYTES # BLD AUTO: 0.2 10E3/UL (ref 0–1.3)
MONOCYTES NFR BLD AUTO: 6 %
NEUTROPHILS # BLD AUTO: 1.6 10E3/UL (ref 1.6–8.3)
NEUTROPHILS NFR BLD AUTO: 61 %
NRBC # BLD AUTO: 0 10E3/UL
NRBC BLD AUTO-RTO: 0 /100
PHOSPHATE SERPL-MCNC: 4.9 MG/DL (ref 2.5–4.5)
PLATELET # BLD AUTO: 199 10E3/UL (ref 150–450)
POTASSIUM SERPL-SCNC: 5.2 MMOL/L (ref 3.4–5.3)
PROT SERPL-MCNC: 5.6 G/DL (ref 6.4–8.3)
RBC # BLD AUTO: 2.74 10E6/UL (ref 3.8–5.2)
SODIUM SERPL-SCNC: 139 MMOL/L (ref 136–145)
TACROLIMUS BLD-MCNC: 8.6 UG/L (ref 5–15)
TME LAST DOSE: NORMAL H
TME LAST DOSE: NORMAL H
WBC # BLD AUTO: 2.6 10E3/UL (ref 4–11)
WBC # BLD AUTO: 2.6 10E3/UL (ref 4–11)

## 2022-12-08 PROCEDURE — 99213 OFFICE O/P EST LOW 20 MIN: CPT | Performed by: TRANSPLANT SURGERY

## 2022-12-08 PROCEDURE — 83735 ASSAY OF MAGNESIUM: CPT | Performed by: PATHOLOGY

## 2022-12-08 PROCEDURE — 85025 COMPLETE CBC W/AUTO DIFF WBC: CPT | Performed by: PATHOLOGY

## 2022-12-08 PROCEDURE — 80197 ASSAY OF TACROLIMUS: CPT | Performed by: TRANSPLANT SURGERY

## 2022-12-08 PROCEDURE — G0463 HOSPITAL OUTPT CLINIC VISIT: HCPCS | Performed by: TRANSPLANT SURGERY

## 2022-12-08 PROCEDURE — 80053 COMPREHEN METABOLIC PANEL: CPT | Performed by: PATHOLOGY

## 2022-12-08 PROCEDURE — 36415 COLL VENOUS BLD VENIPUNCTURE: CPT | Performed by: PATHOLOGY

## 2022-12-08 PROCEDURE — 84100 ASSAY OF PHOSPHORUS: CPT | Performed by: PATHOLOGY

## 2022-12-08 PROCEDURE — 82105 ALPHA-FETOPROTEIN SERUM: CPT | Performed by: INTERNAL MEDICINE

## 2022-12-08 PROCEDURE — 82248 BILIRUBIN DIRECT: CPT | Performed by: PATHOLOGY

## 2022-12-08 NOTE — NURSING NOTE
Chief Complaint   Patient presents with     Liver Transplant     Post op f/up     Blood pressure 114/78, pulse 71, weight 81.7 kg (180 lb 1.6 oz), SpO2 100 %, not currently breastfeeding.    Mae Scanlon, CMA

## 2022-12-09 ENCOUNTER — TELEPHONE (OUTPATIENT)
Dept: TRANSPLANT | Facility: CLINIC | Age: 52
End: 2022-12-09

## 2022-12-09 NOTE — TELEPHONE ENCOUNTER
Susan called stating [facility] still hasn't received the fax regarding her PT/OT.   I had sent the fax on 12/7, will call Monday to see if there is a different fax I can send it to/give verbal referral.

## 2022-12-09 NOTE — LETTER
OUTPATIENT OR TRANSITIONAL CARE  REFERRAL ORDER  [facility]  FAX #: [phone]    Patient Name: Susan Puckett  Transplant Date: 11/2/2022   YOB: 1970  Issue Date: 12/12/22   [facility] MR#:[id number]  Expiration Date: 12/12/23       Diagnoses: [x]      Liver Transplant (ICD-10 Z94.4)    [x]      Long term use of medications (ICD-10 Z79.899)     Please fax results to [phone]    Frequency: ***        [x] CBC with Platelets   [x] Basic Metabolic Panel (Sodium, Potassium, Chloride, CO2, Creatinine, Urea Nitrogen, Glucose, Calcium)  [x] Phosphorous, Magnesium  [x] Hepatic panel (Albumin, Alk Phos, ALT, AST, Direct and Total Bili)  [] Tacrolimus drug level - 12 hour trough, please document time of last dose   [] Cyclosporine drug level - 12 hour trough, please document time of last dose  [] Rapamune drug level - 24 hour trough, please document time of last dose  [] Everolimus drug level - 12 hour trough, please document time of last dose    Other Frequency: annually ***  [x]      Fasting lipid panel  [x] Random urine protein  [x] Urinalysis with reflex to micro    If you have questions, please call [phone] or [phone].    {[facility] TRANSPLANT MD SIGNATURE:[id number]}

## 2022-12-09 NOTE — LETTER
OUTPATIENT OR TRANSITIONAL CARE  [facility]ERRAL ORDER  [facility]  FAX #: [phone]    Patient Name: Susan Puckett  Transplant Date: 11/2/2022   YOB: 1970  Issue Date: 12/12/22   [facility] MR#:[id number]  Expiration Date: 12/12/23       Diagnoses: [x]      Liver Transplant (ICD-10 Z94.4)    [x]      Long term use of medications (ICD-10 Z79.899)     Please fax results to [phone]    REFERRAL        [x]   Adult PT/OT referral for physical deconditioning. History of liver transplant 11/2/2022.    If you have questions, please call transplant coordinator at [phone].          Delbert Morgan MD/PhD  Professor of Surgery  Chief, Division of Transplantation    Executive Medical Director  Solid Organ Transplant Service [facility]

## 2022-12-10 ENCOUNTER — TELEPHONE (OUTPATIENT)
Dept: TRANSPLANT | Facility: CLINIC | Age: 52
End: 2022-12-10

## 2022-12-10 LAB
BACTERIA BLD CULT: NO GROWTH
BACTERIA BLD CULT: NO GROWTH

## 2022-12-10 NOTE — TELEPHONE ENCOUNTER
"Susan and Roly called to report a bp of 146/91, no headache, no shortness of breath, no weakness.  Susan does state, \"My chest doesn't hurt but feels like it does with anxiety.\"  Susan and Roly advised if Susan is not calming, and feeling ok, they should have her evaluated in the emergency room.  If she is otherwise feeling fine, they should continue to monitor her blood pressure on daily basis and log as advised by the transplant team and notify team if blood pressure continues to be high.  This was an isolated blood pressure this week, and has been 90-1teens systolic.  Susan states no changes to diet or activity, and she otherwise feels fine, she is worried about the blood pressure being high.  Susan advised we would not that her blood pressure was high on todays check but no changes to medication is needed at this time. They will seek evaluation, if this continues today  "

## 2022-12-12 ENCOUNTER — TELEPHONE (OUTPATIENT)
Dept: TRANSPLANT | Facility: CLINIC | Age: 52
End: 2022-12-12

## 2022-12-12 ENCOUNTER — LAB (OUTPATIENT)
Dept: LAB | Facility: CLINIC | Age: 52
End: 2022-12-12
Payer: COMMERCIAL

## 2022-12-12 DIAGNOSIS — Z94.4 LIVER TRANSPLANTED (H): ICD-10-CM

## 2022-12-12 DIAGNOSIS — Z94.4 LIVER REPLACED BY TRANSPLANT (H): ICD-10-CM

## 2022-12-12 LAB
ALBUMIN SERPL-MCNC: 2.7 G/DL (ref 3.4–5)
ALP SERPL-CCNC: 93 U/L (ref 40–150)
ALT SERPL W P-5'-P-CCNC: 12 U/L (ref 0–50)
ANION GAP SERPL CALCULATED.3IONS-SCNC: 4 MMOL/L (ref 3–14)
AST SERPL W P-5'-P-CCNC: 14 U/L (ref 0–45)
BILIRUB DIRECT SERPL-MCNC: 0.2 MG/DL (ref 0–0.2)
BILIRUB SERPL-MCNC: 0.4 MG/DL (ref 0.2–1.3)
BUN SERPL-MCNC: 13 MG/DL (ref 7–30)
CALCIUM SERPL-MCNC: 8.2 MG/DL (ref 8.5–10.1)
CHLORIDE BLD-SCNC: 117 MMOL/L (ref 94–109)
CO2 SERPL-SCNC: 20 MMOL/L (ref 20–32)
CREAT SERPL-MCNC: 1.02 MG/DL (ref 0.52–1.04)
ERYTHROCYTE [DISTWIDTH] IN BLOOD BY AUTOMATED COUNT: 15.2 % (ref 10–15)
GFR SERPL CREATININE-BSD FRML MDRD: 67 ML/MIN/1.73M2
GLUCOSE BLD-MCNC: 182 MG/DL (ref 70–99)
HCT VFR BLD AUTO: 27.6 % (ref 35–47)
HGB BLD-MCNC: 8.7 G/DL (ref 11.7–15.7)
HOLD SPECIMEN: NORMAL
MAGNESIUM SERPL-MCNC: 1.6 MG/DL (ref 1.6–2.3)
MCH RBC QN AUTO: 31.3 PG (ref 26.5–33)
MCHC RBC AUTO-ENTMCNC: 31.5 G/DL (ref 31.5–36.5)
MCV RBC AUTO: 99 FL (ref 78–100)
PHOSPHATE SERPL-MCNC: 4.5 MG/DL (ref 2.5–4.5)
PLATELET # BLD AUTO: 190 10E3/UL (ref 150–450)
POTASSIUM BLD-SCNC: 5.7 MMOL/L (ref 3.4–5.3)
PROT SERPL-MCNC: 6 G/DL (ref 6.8–8.8)
RBC # BLD AUTO: 2.78 10E6/UL (ref 3.8–5.2)
SODIUM SERPL-SCNC: 141 MMOL/L (ref 133–144)
WBC # BLD AUTO: 2.6 10E3/UL (ref 4–11)

## 2022-12-12 PROCEDURE — 82248 BILIRUBIN DIRECT: CPT

## 2022-12-12 PROCEDURE — 80053 COMPREHEN METABOLIC PANEL: CPT

## 2022-12-12 PROCEDURE — 80197 ASSAY OF TACROLIMUS: CPT

## 2022-12-12 PROCEDURE — 85027 COMPLETE CBC AUTOMATED: CPT

## 2022-12-12 PROCEDURE — 84100 ASSAY OF PHOSPHORUS: CPT

## 2022-12-12 PROCEDURE — 36415 COLL VENOUS BLD VENIPUNCTURE: CPT

## 2022-12-12 PROCEDURE — 83735 ASSAY OF MAGNESIUM: CPT

## 2022-12-12 RX ORDER — ACETAMINOPHEN 325 MG/1
975 TABLET ORAL EVERY 8 HOURS PRN
Qty: 100 TABLET | Refills: 0 | Status: ON HOLD | OUTPATIENT
Start: 2022-12-12 | End: 2024-03-04

## 2022-12-12 NOTE — TELEPHONE ENCOUNTER
Pt is requesting new rx for    Bumex 1mg tab    Did not see on active med list please verify and send new rx    [place] spec/mail pharmacy  [phone]

## 2022-12-12 NOTE — TELEPHONE ENCOUNTER
"Spoke to Susan. Reviewed labs from yesterday. Discussed avoiding foods high in K, went over a list of foods. Creatinine looks good at 1.02, encouraged patient to continue drinking fluids. Tac level pending, will decrease if able.    Discussed brain fog, relayed to patient that some of the medications she is on currently can be contributing to her brain fog and should get better as she is farther out from surgery.     Discussed patient's migraines. She reports when she gets her migraines, she waits until the migraine gets bad enough and then will take Maxalt. She reports about 30 min after taking Maxalt she experiences nausea and a stomach ache. She was told by neurologist that she can take Zofran 2 hours after taking Maxalt. Advised patient to try taking Tylenol 1,000 mg, drinking tons of water at the very start of migraine. If migraine worsens, to take Maxalt but to make sure she has some food in her stomach when she does. Discussed with patient that taking Maxalt on an empty stomach can be the cause of her ongoing nausea, stomach aches. Patient understands and will try taking Tylenol, drinking fluids and eating a snack when taking Maxalt. Patient had questions regarding OTC ash drops to help with nausea- per pharm not okay to take. Patient aware. Patient requesting Reglan for back up when Zofran isn't working, instructed patient to ask Dr Ordoñez's on Thursday while in clinic.    Patient also discussed joint pain. She reports that it's \"very sore\". She states that Davi advised her to put capsaicin cream on joints to relieve pain but hasn't tried it because she is \"scared\". I advised patient to use the cream to see if it helps as it was approved by our pharmacist. I advised patient to put cream on in morning before becoming active during the day.    Patient also discussed weakness ambulating up the stairs. Told patient that it is normal to feel weak at this time as her body has gone through a major surgery and she " is deconditioned. I let her know I re-sent the PT/OT referral to [facility], once she starts getting therapy they will be able to go through exercises with her that will increase her strength.    We discussed her increased BP. Highest BP reading over this past weekend 151/92. BP today 136/88. Patient denies CP, SOB, heart rate abnormalities, vision changes, lightheaded/dizziness. Discussed with patient that many things can cause a higher BP. Anxiety, pain, etc. We discussed that while her BP was higher than it normally is, it is not a cause for too much concern as long as she doesn't have additional symptoms. Encouraged patient to continue checking her BP daily and present to ER if she develops new/worseing symptoms.

## 2022-12-12 NOTE — TELEPHONE ENCOUNTER
Spoke to [facility], they state they do not schedule for [facility]. [facility], new fax # given to re-fax PT/OT referral. Referral re-faxed.    [facility]  Fax #: [phone]

## 2022-12-12 NOTE — TELEPHONE ENCOUNTER
TRIAGE CALL    Page received at [id number]    ISSUE: patient paged RNCC on call to report hypertension, BP: 151/92  Patient states she had a migraine earlier this morning.  Prescribed Rizatriptan was taken per patient however she reports a lingering headache.    Denies vision changes, dizziness, chest pain, SOB, or weakness.  RNCC on call was paged day prior to report hypertension as well, /91.  Baseline systolic BP low 100-115 per patient.    Patient denies any medication or activity changes.  States he has been in more pain at incision site and has been nauseated from her migraine as stated above.    Denies any abnormal abdominal bloating or swelling.    States she has not been hydrating as much as she should as she has been sleeping more and nauseated.    Repeat BP while on the phone with RNCC: 130/87, HR 69    Patient advised to continue to monitor BP daily.  Page RNCC on call again should hypertension worsen especially with any worsening headache, dizziness, SOB, chest pain.    Recommend hydration, rest, and continue to monitor daily BP.    patient v/u and will follow up with RNCC Monday 12/12    Ana María Grant RN   Transplant Coordinator  [phone]

## 2022-12-13 DIAGNOSIS — E83.42 HYPOMAGNESEMIA: ICD-10-CM

## 2022-12-13 DIAGNOSIS — Z94.4 LIVER TRANSPLANT RECIPIENT (H): Primary | ICD-10-CM

## 2022-12-13 DIAGNOSIS — G62.9 PERIPHERAL NEUROPATHY: ICD-10-CM

## 2022-12-13 DIAGNOSIS — Z94.4 LIVER REPLACED BY TRANSPLANT (H): ICD-10-CM

## 2022-12-13 LAB
TACROLIMUS BLD-MCNC: 9.3 UG/L (ref 5–15)
TME LAST DOSE: NORMAL H
TME LAST DOSE: NORMAL H

## 2022-12-13 RX ORDER — VALGANCICLOVIR 450 MG/1
450 TABLET, FILM COATED ORAL DAILY
Qty: 90 TABLET | Refills: 3 | Status: SHIPPED | OUTPATIENT
Start: 2022-12-13 | End: 2023-01-02

## 2022-12-13 RX ORDER — DAPSONE 25 MG/1
50 TABLET ORAL DAILY
Qty: 180 TABLET | Refills: 1 | Status: SHIPPED | OUTPATIENT
Start: 2022-12-13 | End: 2023-03-02

## 2022-12-13 RX ORDER — MYCOPHENOLATE MOFETIL 250 MG/1
750 CAPSULE ORAL
Qty: 540 CAPSULE | Refills: 3 | Status: SHIPPED | OUTPATIENT
Start: 2022-12-13 | End: 2022-12-29

## 2022-12-13 RX ORDER — TACROLIMUS 1 MG/1
1 CAPSULE ORAL EVERY 12 HOURS
Qty: 180 CAPSULE | Refills: 3 | Status: SHIPPED | OUTPATIENT
Start: 2022-12-13 | End: 2022-12-23

## 2022-12-13 RX ORDER — MAGNESIUM OXIDE 400 MG/1
400 TABLET ORAL 3 TIMES DAILY
Qty: 270 TABLET | Refills: 3 | Status: SHIPPED | OUTPATIENT
Start: 2022-12-13 | End: 2023-01-05 | Stop reason: ALTCHOICE

## 2022-12-13 RX ORDER — ONDANSETRON 8 MG/1
TABLET, FILM COATED ORAL EVERY 8 HOURS PRN
COMMUNITY
End: 2022-12-13

## 2022-12-13 RX ORDER — GABAPENTIN 100 MG/1
200 CAPSULE ORAL 3 TIMES DAILY
Qty: 90 CAPSULE | Refills: 2 | Status: SHIPPED | OUTPATIENT
Start: 2022-12-13 | End: 2023-01-13

## 2022-12-13 RX ORDER — TACROLIMUS 5 MG/1
5 CAPSULE ORAL EVERY 12 HOURS
Qty: 180 CAPSULE | Refills: 3 | Status: SHIPPED | OUTPATIENT
Start: 2022-12-13 | End: 2022-12-23

## 2022-12-13 NOTE — TELEPHONE ENCOUNTER
Pt is requesting new rx for    Omnicef 300mg caps    Did not see on active med list please verify and send new rx    [place] spec/mail pharmacy  [phone]

## 2022-12-13 NOTE — TELEPHONE ENCOUNTER
Post Discharge Liver Transplant Phone Call (within 1-3 business days post discharge)      Reviewed with the patient the Transplant Center contact information:  Best phone contact is [phone] Monday-Friday from 8am-5pm.   *You may have to leave a message and get a call back.    Good alternative communication method is via [employer] message.    Coordinators are available 8am-5pm M-F, otherwise there will be an on-call RN available 24/7  *If calling after hours, please call [phone] and ask to speak with the on-call Transplant Coordinator    When to contact the Transplant Center:  - Fever > 100.5F  - Dizziness, lightheadedness  - Severe pain  - If you are unable to take your immunosuppression medications.  - Unable to obtain refill on immunosuppressive medications    Medications:  Reviewed medications with patient.   - confirmed pt has supply of meds  - MAR is up to date   - Verify preferred pharmacy in [place]  - Reminded patient to always contact the pharmacy first for refills    Confirmed the patient has an up to date medication card at home and is knowledgeable in updating their med card (or has someone to assist in this).   - Reinforced patient always bring med card to appointment      Labs:  Labs are expected to be drawn on Monday and Thursday until you are told differently by your transplant team.   - confirmed patient's preferred lab and updated EPIC   - Take a copy of your lab letter with to each lab draw   - Lab order sent directly to patient via [employer] or mail.    - Confirmed patient has a copy of lab letter  - Review how to review lab results on [employer] or obtaining and recording lab values in handbook    Vitals:  Encouraged the patient to record the following:  - BP - daily unless light headed  - Temperature - daily  - Weight - daily    Follow Up:    Role of the Transplant Coordinator vs LPN was reviewed. Contact information provided for both.     Reviewed current scheduled follow up appointments with  surgeon.      Reminded the patient to follow up with PCP within 1 -2 weeks for general follow-up and management of diabetes, pain, and blood pressure    STENT REMOVAL - reminded patient that if stent was placed at time of transplant (this is indicated on the check list) this will need to come out 2-3 mos post transplant.  Asked patient to notify transplant coordinator if sees stent in stool.    Has the patient been contacted with initial visit from HOME CARE? No  o If not, Transplant Coordinator to be notified to follow up with Home Care  o Pt will use [facility]    Ariana Liao LPN

## 2022-12-14 NOTE — PROGRESS NOTES
Transplant Surgery Progress Note    Transplants:  11/2/2022 (Liver); Postoperative day:  36  S: overall doing better, recent hospitalization for headache, N/V now improved    Transplant History:    Transplant Type:  Liver Tx  Donor Type:    Transplant Date:  11/2/2022 (Liver)   Biliary Stent:  No       Acute Rejection Hx:  No    Present Maintenance Immunosuppression:  Tacrolimus and Mycophenolate mofetil    CMV IgG Ab Discordance:  No  EBV IgG Ab Discordance:  No    Transplant Coordinator: Edwina Flalon     Transplant Office Phone Number: [phone]     Immunosuppressant Medications     Immunosuppressive Agents Disp Start End     mycophenolate (GENERIC EQUIVALENT) 250 MG capsule    180 capsule 12/6/2022     Sig - Route: Take 3 capsules (750 mg) by mouth two times daily - Oral    Class: E-Prescribe    Renewals     Renewal requests to authorizing provider (Bhavana Romo NP) <b>prohibited</b>           tacrolimus (GENERIC EQUIVALENT) 1 MG capsule    360 capsule 12/6/2022     Sig - Route: Take 6 capsules (6 mg) by mouth two times daily - Oral    Class: E-Prescribe          Possible Immunosuppression-related side effects:   []             headache  []             vivid dreams  []             irritability  []             cognitive difficuties  []             fine tremor  []             nausea  []             diarrhea  []             neuropathy      []             edema  []             renal calcineurin toxicity  []             hyperkalemia  []             post-transplant diabetes  []             decreased appetite  []             increased appetite  []             other:  []             none    Prescription Medications as of 12/8/2022       Rx Number Disp Refills Start End Last Dispensed Date Next Fill Date Owning Pharmacy    acetaminophen (TYLENOL) 325 MG tablet  100 tablet 0 12/6/2022    [facility] [place], [place] - [address] [facility]    Sig: Take 3 tablets (975 mg) by mouth every 8  hours as needed for mild pain    Class: E-Prescribe    Route: Oral    amylase-lipase-protease (CREON 24) 90821-21225 units CPEP per EC capsule  180 capsule 3 11/18/2022    [facility]/[place] [address] [address]    Sig: Take 2 capsules by mouth 3 times daily (with meals)    Class: E-Prescribe    Route: Oral    amylase-lipase-protease (CREON 24) 22479-79305 units CPEP per EC capsule  90 capsule 3 11/18/2022    [facility]/[place] [address] [address]    Sig: Take 1 capsule by mouth Take with snacks or supplements (with snacks)    Class: E-Prescribe    Route: Oral    aspirin (ASA) 81 MG chewable tablet  90 tablet 3 11/18/2022    [facility]/[place] [address] [address]    Sig: Take 1 tablet (81 mg) by mouth daily    Class: E-Prescribe    Route: Oral    calcium carbonate-vitamin D (OSCAL) 500-5 MG-MCG tablet    12/1/2022        Sig: Take 1 tablet by mouth 2 times daily    Class: Historical    Route: Oral    capsaicin 0.035 % CREA            Sig: Externally apply topically 3 times daily    Class: Historical    Route: Apply externally    Continuous Blood Gluc  (DEXCOM G6 ) LUNA    6/1/2021        Sig: Use as directed for continuous glucose monitoring.    Class: Historical    Continuous Blood Gluc Sensor (DEXCOM G6 SENSOR) MISC    6/1/2021        Sig: Use as directed for continuous glucose monitoring.  Change sensor every 10 days.    Class: Historical    Continuous Blood Gluc Transmit (DEXCOM G6 TRANSMITTER) MISC    6/1/2021        Sig: Use as directed for continuous glucose monitoring.  Change every 3 months.    Class: Historical    cyanocobalamin (VITAMIN B-12) 1000 MCG tablet    9/2/2020    [facility] [address] [place] [facility] [address] [facility]    Sig: Take 1,000 mcg by mouth every 7 days Take 1 tablet by mouth every 7 days on Wednesdays    Class: Historical    Route: Oral    cyclobenzaprine (FLEXERIL) 10  MG tablet  30 tablet 0 12/6/2022    [facility] [place], [place] - [address]    Sig: Take 1 tablet (10 mg) by mouth every 8 hours as needed for muscle spasms    Class: E-Prescribe    Route: Oral    Renewals     Renewal requests to authorizing provider (Bhavana Romo NP) <b>prohibited</b>          dapsone (ACZONE) 25 MG tablet  60 tablet 4 12/6/2022    [facility] [place] [address] [address]    Sig: Take 2 tablets (50 mg) by mouth daily    Class: E-Prescribe    Route: Oral    Renewals     Renewal requests to authorizing provider (Bhavana Romo NP) <b>prohibited</b>          escitalopram (LEXAPRO) 20 MG tablet    11/28/2022        Sig: Take 10 mg by mouth daily    Class: Historical    Route: Oral    folic acid (FOLVITE) 1 MG tablet    2/26/2020    [facility] [id number] [place] [facility] [id number] [place] [address]    Sig: Take 1 mg by mouth every morning    Class: Historical    Route: Oral    gabapentin (NEURONTIN) 100 MG capsule  90 capsule 3 11/23/2022    [facility] [place] [address] [address]    Sig: Take 2 capsules (200 mg) by mouth 3 times daily    Class: E-Prescribe    Route: Oral    insulin aspart (NOVOLOG VIAL) 100 UNITS/ML vial    11/16/2022        Sig: Inject 1-10 Units Subcutaneous 4 times daily (with meals and nightly)    Class: No Print Out    Notes to Pharmacy: 0.5 unit(s) per 14g cho AC breakfast/lunch AND 0.5 unit(s) per 20 g cho for dinner AND NO meal insulin after 2000 hrs.  Snack coverage:   0800 - 1700 - 0.5 unit(s) per 14 g cho and 1701 - 2000 0.5 unit(s) per 20 g cho    Route: Subcutaneous    insulin glargine (LANTUS PEN) 100 UNIT/ML pen    12/6/2022        Sig: Inject 11 Units Subcutaneous every morning (before breakfast)    Class: No Print Out    Notes to Pharmacy: If Lantus is not covered by insurance, may substitute Basaglar or Semglee or other insulin glargine product per insurance  preference at same dose and frequency.      Route: Subcutaneous    insulin pen needle (BD GRISEL U/F) 32G X 4 MM miscellaneous    6/1/2021        Sig: Inject 1 each Subcutaneous    Class: Historical    Route: Subcutaneous    levothyroxine (SYNTHROID/LEVOTHROID) 150 MCG tablet    5/23/2022        Sig: Take 1 tablet by mouth daily    Class: Historical    Route: Oral    LORazepam (ATIVAN) 0.5 MG tablet    11/28/2022        Sig: Take 0.5 mg by mouth nightly as needed For anxiety    Class: Historical    Route: Oral    magnesium oxide (MAG-OX) 400 MG tablet  90 tablet 11 12/1/2022    [facility]    Sig: Take 1 tablet (400 mg) by mouth 3 times daily    Class: E-Prescribe    Route: Oral    melatonin 5 MG tablet            Sig: Take 5 mg by mouth At Bedtime    Class: Historical    Route: Oral    multivitamin CF FORMULA (DEKAS PLUS) capsule  90 capsule 3 3/1/2022    [facility] [address] [place] [facility] [address] [facility]    Sig: Take 1 capsule by mouth daily    Class: E-Prescribe    Route: Oral    mycophenolate (GENERIC EQUIVALENT) 250 MG capsule  180 capsule 11 12/6/2022    [facility]    Sig: Take 3 capsules (750 mg) by mouth two times daily    Class: E-Prescribe    Route: Oral    Renewals     Renewal requests to authorizing provider (Bhavana Romo, NP) <b>prohibited</b>          ondansetron (ZOFRAN ODT) 8 MG ODT tab  30 tablet 0 12/6/2022    [facility] [place], [place] - [address] [facility] [address]    Sig: Take 1 tablet (8 mg) by mouth every 8 hours as needed for nausea    Class: E-Prescribe    Route: Oral    polyethylene glycol (MIRALAX) 17 GM/Dose powder  510 g  11/10/2022        Sig: Take 17 g by mouth daily    Class: Transitional    Route: Oral    rizatriptan (MAXALT) 10 MG tablet    11/28/2022        Sig: Take 10 mg by mouth as needed Take 1 Tablet (10 mg) by mouth as needed for  Headache. at onset of migraine. May repeat in 2 hr if needed up to 30mg in 24 hr & MAX use 5 days/month. Do not use within 24 hours of an ergotamine preparation or a different triptan.    Class: Historical    Route: Oral    simethicone (MYLICON) 80 MG chewable tablet  120 tablet 0 11/17/2022 12/17/2022   [facility] [address]    Sig: Take 1 tablet (80 mg) by mouth every 6 hours as needed for cramping    Class: E-Prescribe    Route: Oral    sitagliptin (JANUVIA) 100 MG tablet  30 tablet 0 11/17/2022    [facility] [address]    Sig: Take 1 tablet (100 mg) by mouth daily    Class: E-Prescribe    Route: Oral    tacrolimus (GENERIC EQUIVALENT) 1 MG capsule  360 capsule 11 12/6/2022    [facility], [place] - [address]    Sig: Take 6 capsules (6 mg) by mouth two times daily    Class: E-Prescribe    Route: Oral    topiramate (TOPAMAX) 25 MG tablet  90 tablet 2 12/6/2022    [place], [place] - [address]    Sig: Take 3 tablets (75 mg) by mouth At Bedtime    Class: E-Prescribe    Route: Oral    Renewals     Renewal requests to authorizing provider (Bhavana Romo, NP) <b>prohibited</b>          traZODone (DESYREL) 50 MG tablet    11/28/2022        Sig: Take 50 mg by mouth At Bedtime    Class: Historical    Route: Oral    valGANciclovir (VALCYTE) 450 MG tablet  90 tablet 3 11/18/2022    [facility], [place] - [address]    Sig: Take 1 tablet (450 mg) by mouth daily    Class: E-Prescribe    Route: Oral          O:   [unfilled]    Transplant Immunosuppression Labs Latest Ref Rng & Units 12/8/2022 12/8/2022 12/6/2022 12/5/2022 12/4/2022   Creat 0.51 - 0.95 mg/dL - 1.06(H) 1.19(H) 1.09(H) 1.25(H)   BUN 6.0 - 20.0 mg/dL - 16.0 17.9 14.7 16.3   WBC 4.0 - 11.0 10e3/uL 2.6(L) 2.6(L) 2.4(L) 2.1(L) 3.6(L)   Neutrophil % 61 - - - 74    ANEU 1.6 - 8.3 10e9/L - - - - -       Chemistries:   Recent Labs   Lab Test 12/08/22  0915   BUN 16.0   CR 1.06*   GFRESTIMATED 64   *     Lab Results   Component Value Date    A1C 5.7 11/02/2022     Recent Labs   Lab Test 12/08/22  0915   ALBUMIN 3.1*   BILITOTAL 0.4   ALKPHOS 109*   AST 16   ALT <5*     Urine Studies:  Recent Labs   Lab Test 11/19/22  0937   COLOR Yellow   APPEARANCE Slightly Cloudy*   URINEGLC Negative   URINEBILI Negative   URINEKETONE Negative   SG 1.017   UBLD Negative   URINEPH 5.5   PROTEIN 20*   NITRITE Positive*   LEUKEST Large*   RBCU 3*   WBCU >182*     No lab results found.  Hematology:   Recent Labs   Lab Test 12/08/22  0915 12/06/22  0630 12/05/22  0553   HGB 8.3* 7.6* 8.0*    176 159   WBC 2.6*  2.6* 2.4* 2.1*     Coags:   Recent Labs   Lab Test 11/04/22  0607 11/03/22  0850   INR 1.32* 1.33*     HLA antibodies:   No results found for: [id number], [id number], [id number], [id number]    Assessment: Susan Puckett is doing well s/p Liver Tx:  Issues we addressed during her visit include:    Plan:    1. Graft function: LFTs normalizing  2. Immunosuppression Management: No change tac 8-10, will watch given headache  .  Complexity of management:Low.  Contributing factors: headache  3. Headache- need follow-up with neurology  Followup: 1 week          Delbert Morgan MD/PhD       herminio all pertinent systems negative

## 2022-12-15 ENCOUNTER — TELEPHONE (OUTPATIENT)
Dept: TRANSPLANT | Facility: CLINIC | Age: 52
End: 2022-12-15

## 2022-12-15 ENCOUNTER — LAB (OUTPATIENT)
Dept: LAB | Facility: CLINIC | Age: 52
End: 2022-12-15
Attending: TRANSPLANT SURGERY
Payer: COMMERCIAL

## 2022-12-15 ENCOUNTER — OFFICE VISIT (OUTPATIENT)
Dept: TRANSPLANT | Facility: CLINIC | Age: 52
End: 2022-12-15
Attending: TRANSPLANT SURGERY
Payer: COMMERCIAL

## 2022-12-15 VITALS
TEMPERATURE: 97.3 F | DIASTOLIC BLOOD PRESSURE: 91 MMHG | HEART RATE: 77 BPM | SYSTOLIC BLOOD PRESSURE: 155 MMHG | BODY MASS INDEX: 28.83 KG/M2 | WEIGHT: 178.6 LBS

## 2022-12-15 DIAGNOSIS — D72.819 LEUKOPENIA, UNSPECIFIED TYPE: Primary | ICD-10-CM

## 2022-12-15 DIAGNOSIS — Z94.4 LIVER REPLACED BY TRANSPLANT (H): Primary | ICD-10-CM

## 2022-12-15 DIAGNOSIS — D72.819 LEUKOPENIA, UNSPECIFIED TYPE: ICD-10-CM

## 2022-12-15 DIAGNOSIS — Z94.4 LIVER REPLACED BY TRANSPLANT (H): ICD-10-CM

## 2022-12-15 DIAGNOSIS — E83.42 HYPOMAGNESEMIA: Primary | ICD-10-CM

## 2022-12-15 LAB
ALBUMIN SERPL BCG-MCNC: 3.2 G/DL (ref 3.5–5.2)
ALP SERPL-CCNC: 136 U/L (ref 35–104)
ALT SERPL W P-5'-P-CCNC: 5 U/L (ref 10–35)
ANION GAP SERPL CALCULATED.3IONS-SCNC: 8 MMOL/L (ref 7–15)
AST SERPL W P-5'-P-CCNC: 20 U/L (ref 10–35)
BASOPHILS # BLD AUTO: 0 10E3/UL (ref 0–0.2)
BASOPHILS NFR BLD AUTO: 1 %
BILIRUB DIRECT SERPL-MCNC: <0.2 MG/DL (ref 0–0.3)
BILIRUB SERPL-MCNC: 0.3 MG/DL
BUN SERPL-MCNC: 14.8 MG/DL (ref 6–20)
CALCIUM SERPL-MCNC: 8.2 MG/DL (ref 8.6–10)
CHLORIDE SERPL-SCNC: 109 MMOL/L (ref 98–107)
CREAT SERPL-MCNC: 1.12 MG/DL (ref 0.51–0.95)
DEPRECATED HCO3 PLAS-SCNC: 21 MMOL/L (ref 22–29)
EOSINOPHIL # BLD AUTO: 0 10E3/UL (ref 0–0.7)
EOSINOPHIL NFR BLD AUTO: 1 %
ERYTHROCYTE [DISTWIDTH] IN BLOOD BY AUTOMATED COUNT: 15 % (ref 10–15)
GFR SERPL CREATININE-BSD FRML MDRD: 60 ML/MIN/1.73M2
GLUCOSE SERPL-MCNC: 208 MG/DL (ref 70–99)
HCT VFR BLD AUTO: 29.6 % (ref 35–47)
HGB BLD-MCNC: 8.7 G/DL (ref 11.7–15.7)
IMM GRANULOCYTES # BLD: 0 10E3/UL
IMM GRANULOCYTES NFR BLD: 0 %
LYMPHOCYTES # BLD AUTO: 0.5 10E3/UL (ref 0.8–5.3)
LYMPHOCYTES NFR BLD AUTO: 21 %
MAGNESIUM SERPL-MCNC: 1.5 MG/DL (ref 1.7–2.3)
MCH RBC QN AUTO: 30 PG (ref 26.5–33)
MCHC RBC AUTO-ENTMCNC: 29.4 G/DL (ref 31.5–36.5)
MCV RBC AUTO: 102 FL (ref 78–100)
MONOCYTES # BLD AUTO: 0.1 10E3/UL (ref 0–1.3)
MONOCYTES NFR BLD AUTO: 6 %
NEUTROPHILS # BLD AUTO: 1.8 10E3/UL (ref 1.6–8.3)
NEUTROPHILS NFR BLD AUTO: 71 %
PHOSPHATE SERPL-MCNC: 4.9 MG/DL (ref 2.5–4.5)
PLATELET # BLD AUTO: 156 10E3/UL (ref 150–450)
POTASSIUM SERPL-SCNC: 5.2 MMOL/L (ref 3.4–5.3)
PROT SERPL-MCNC: 5.7 G/DL (ref 6.4–8.3)
RBC # BLD AUTO: 2.9 10E6/UL (ref 3.8–5.2)
SODIUM SERPL-SCNC: 138 MMOL/L (ref 136–145)
TACROLIMUS BLD-MCNC: 8.6 UG/L (ref 5–15)
TME LAST DOSE: NORMAL H
TME LAST DOSE: NORMAL H
WBC # BLD AUTO: 2.5 10E3/UL (ref 4–11)
WBC # BLD AUTO: ABNORMAL 10*3/UL

## 2022-12-15 PROCEDURE — G0463 HOSPITAL OUTPT CLINIC VISIT: HCPCS | Performed by: TRANSPLANT SURGERY

## 2022-12-15 PROCEDURE — 83735 ASSAY OF MAGNESIUM: CPT | Performed by: PATHOLOGY

## 2022-12-15 PROCEDURE — 80053 COMPREHEN METABOLIC PANEL: CPT | Performed by: PATHOLOGY

## 2022-12-15 PROCEDURE — 80197 ASSAY OF TACROLIMUS: CPT | Performed by: TRANSPLANT SURGERY

## 2022-12-15 PROCEDURE — 36415 COLL VENOUS BLD VENIPUNCTURE: CPT | Performed by: PATHOLOGY

## 2022-12-15 PROCEDURE — 85025 COMPLETE CBC W/AUTO DIFF WBC: CPT | Performed by: PATHOLOGY

## 2022-12-15 PROCEDURE — 82248 BILIRUBIN DIRECT: CPT | Performed by: PATHOLOGY

## 2022-12-15 PROCEDURE — 84100 ASSAY OF PHOSPHORUS: CPT | Performed by: PATHOLOGY

## 2022-12-15 PROCEDURE — 99213 OFFICE O/P EST LOW 20 MIN: CPT | Performed by: TRANSPLANT SURGERY

## 2022-12-15 RX ORDER — HEPARIN SODIUM (PORCINE) LOCK FLUSH IV SOLN 100 UNIT/ML 100 UNIT/ML
5 SOLUTION INTRAVENOUS
Status: CANCELLED | OUTPATIENT
Start: 2022-12-15

## 2022-12-15 RX ORDER — ALBUTEROL SULFATE 0.83 MG/ML
2.5 SOLUTION RESPIRATORY (INHALATION)
Status: CANCELLED | OUTPATIENT
Start: 2022-12-15

## 2022-12-15 RX ORDER — HEPARIN SODIUM,PORCINE 10 UNIT/ML
5 VIAL (ML) INTRAVENOUS
Status: CANCELLED | OUTPATIENT
Start: 2022-12-15

## 2022-12-15 RX ORDER — EPINEPHRINE 1 MG/ML
0.3 INJECTION, SOLUTION, CONCENTRATE INTRAVENOUS EVERY 5 MIN PRN
Status: CANCELLED | OUTPATIENT
Start: 2022-12-15

## 2022-12-15 RX ORDER — DIPHENHYDRAMINE HYDROCHLORIDE 50 MG/ML
50 INJECTION INTRAMUSCULAR; INTRAVENOUS
Status: CANCELLED
Start: 2022-12-15

## 2022-12-15 RX ORDER — PROCHLORPERAZINE MALEATE 10 MG
5 TABLET ORAL EVERY 8 HOURS PRN
Qty: 10 TABLET | Refills: 0 | Status: SHIPPED | OUTPATIENT
Start: 2022-12-15 | End: 2023-02-09

## 2022-12-15 RX ORDER — MEPERIDINE HYDROCHLORIDE 25 MG/ML
25 INJECTION INTRAMUSCULAR; INTRAVENOUS; SUBCUTANEOUS EVERY 30 MIN PRN
Status: CANCELLED | OUTPATIENT
Start: 2022-12-15

## 2022-12-15 RX ORDER — METHYLPREDNISOLONE SODIUM SUCCINATE 125 MG/2ML
125 INJECTION, POWDER, LYOPHILIZED, FOR SOLUTION INTRAMUSCULAR; INTRAVENOUS
Status: CANCELLED
Start: 2022-12-15

## 2022-12-15 RX ORDER — ALBUTEROL SULFATE 90 UG/1
1-2 AEROSOL, METERED RESPIRATORY (INHALATION)
Status: CANCELLED
Start: 2022-12-15

## 2022-12-15 RX ORDER — MAGNESIUM SULFATE HEPTAHYDRATE 40 MG/ML
4 INJECTION, SOLUTION INTRAVENOUS ONCE
Status: CANCELLED
Start: 2022-12-15 | End: 2022-12-15

## 2022-12-15 NOTE — TELEPHONE ENCOUNTER
Called Susan to inform her that a  from the [facility] will be calling her regarding her Mag infusion. [place] at  [place] [facility] [place]    Per Dr Morgan, requesting to see Susan back at clinic in 2 weeks. Appt request placed.

## 2022-12-15 NOTE — PROGRESS NOTES
"Per Dr Morgan:    \"I don't think she needs any bumex at this point. She might benefit from some additional magnesium replacement, can we set her up to get 4g IV?  I encouraged her to try and get in to see neuro for the headaches. If she continued to have BPs >140/80 then we should start something low dose for her BP control\"    Informed Susan she does not need additional Bumex at this point. Therapy plan entered for Mag infusion, patient aware.  "

## 2022-12-15 NOTE — TELEPHONE ENCOUNTER
Susan called needing urgent referral faxed to [facility] neurology to be evaluated for her headaches. She is also requesting the latest progress note faxed from when she was inpatient at the [facility].    Instructed Susan to call her PCP to place urgent referral as she has seen Susan for her headaches and has prescribed migraine medication for her. Medical records request sent to admin to fax latest progress note from Lacy Fu MD from when Susan was inpatient.    Fax #:

## 2022-12-15 NOTE — LETTER
12/15/2022         RE: Susan Puckett  [address] [facility] [id number]        Dear Colleague,    Thank you for referring your patient, Susan Puckett, to the [facility]. Please see a copy of my visit note below.    Transplant Surgery Progress Note    Transplants:  11/2/2022 (Liver); Postoperative day:  50  S: overall doing better, still has some occasional headache but improved since recent admission, denies f/c/n/v/d    Transplant History:    Transplant Type:  Liver Tx  Donor Type:    Transplant Date:  11/2/2022 (Liver)   Biliary Stent:  No       Acute Rejection Hx:  No    Present Maintenance Immunosuppression:  Tacrolimus and Mycophenolate mofetil    CMV IgG Ab Discordance:  No  EBV IgG Ab Discordance:  No    Transplant Coordinator: Edwina Fallon     Transplant Office Phone Number: [phone]     Immunosuppressant Medications     Immunosuppressive Agents Disp Start End     mycophenolate (GENERIC EQUIVALENT) 250 MG capsule    540 capsule 12/13/2022     Sig - Route: Take 3 capsules (750 mg) by mouth two times daily - Oral    Class: E-Prescribe    Notes to Pharmacy: TXP DT 11/2/2022 (Liver) TXP Dischg DT 11/10/2022 DX Liver replaced by transplant Z94.4 [facility] ([place], [place])     tacrolimus (GENERIC EQUIVALENT) 1 MG capsule    180 capsule 12/13/2022     Sig - Route: Take 1 capsule (1 mg) by mouth every 12 hours Total dose 6mg BID - Oral    Class: E-Prescribe    Notes to Pharmacy: TXP DT 11/2/2022 (Liver) TXP Dischg DT 11/10/2022 DX Liver replaced by transplant Z94.4 [facility] ([place], [place])     tacrolimus (GENERIC EQUIVALENT) 5 MG capsule    180 capsule 12/13/2022     Sig - Route: Take 1 capsule (5 mg) by mouth every 12 hours Total dose 6mg BID - Oral    Class: E-Prescribe    Notes to Pharmacy: TXP DT 11/2/2022 (Liver) TXP Dischg DT 11/10/2022 DX Liver replaced by  transplant Z94.4 [facility] ([place], [place])          Possible Immunosuppression-related side effects:   [x]             headache  []             vivid dreams  []             irritability  []             cognitive difficuties  []             fine tremor  []             nausea  []             diarrhea  []             neuropathy      []             edema  []             renal calcineurin toxicity  []             hyperkalemia  []             post-transplant diabetes  []             decreased appetite  []             increased appetite  []             other:  []             none    Prescription Medications as of 12/22/2022       Rx Number Disp Refills Start End Last Dispensed Date Next Fill Date Owning Pharmacy    acetaminophen (TYLENOL) 325 MG tablet  100 tablet 0 12/12/2022    [facility] [place] [address] [address]    Sig: Take 3 tablets (975 mg) by mouth every 8 hours as needed for mild pain    Class: E-Prescribe    Route: Oral    amylase-lipase-protease (CREON 24) 69946-83813 units CPEP per EC capsule  180 capsule 3 11/18/2022    [facility] [place] [address]    Sig: Take 2 capsules by mouth 3 times daily (with meals)    Class: E-Prescribe    Route: Oral    amylase-lipase-protease (CREON 24) 42415-02434 units CPEP per EC capsule  90 capsule 3 11/18/2022    [facility] [place] [address] [address]    Sig: Take 1 capsule by mouth Take with snacks or supplements (with snacks)    Class: E-Prescribe    Route: Oral    aspirin (ASA) 81 MG chewable tablet  90 tablet 3 11/18/2022    [facility] [place] [address] [address]    Sig: Take 1 tablet (81 mg) by mouth daily    Class: E-Prescribe    Route: Oral    calcium carbonate-vitamin D (OSCAL) 500-5 MG-MCG tablet    12/1/2022        Sig: Take 1 tablet by mouth 2 times daily    Class: Historical     Route: Oral    capsaicin 0.035 % CREA            Sig: Externally apply topically 3 times daily    Class: Historical    Route: Apply externally    Continuous Blood Gluc  (DEXCOM G6 ) LUNA    6/1/2021        Sig: Use as directed for continuous glucose monitoring.    Class: Historical    Continuous Blood Gluc Sensor (DEXCOM G6 SENSOR) MISC    6/1/2021        Sig: Use as directed for continuous glucose monitoring.  Change sensor every 10 days.    Class: Historical    Continuous Blood Gluc Transmit (DEXCOM G6 TRANSMITTER) MISC    6/1/2021        Sig: Use as directed for continuous glucose monitoring.  Change every 3 months.    Class: Historical    cyanocobalamin (VITAMIN B-12) 1000 MCG tablet    9/2/2020    [facility] [address] [place] [address]    Sig: Take 1,000 mcg by mouth every 7 days Take 1 tablet by mouth every 7 days on Wednesdays    Class: Historical    Route: Oral    cyclobenzaprine (FLEXERIL) 10 MG tablet  30 tablet 0 12/6/2022    [facility] [place], [place] - [address] [facility]    Sig: Take 1 tablet (10 mg) by mouth every 8 hours as needed for muscle spasms    Class: E-Prescribe    Route: Oral    Renewals     Renewal requests to authorizing provider (Bhavana Romo, NP) <b>prohibited</b>          dapsone (ACZONE) 25 MG tablet  180 tablet 1 12/13/2022    [facility] [address]    Sig: Take 2 tablets (50 mg) by mouth daily    Class: E-Prescribe    Route: Oral    escitalopram (LEXAPRO) 20 MG tablet    11/28/2022        Sig: Take 20 mg by mouth daily    Class: Historical    Route: Oral    folic acid (FOLVITE) 1 MG tablet    2/26/2020    [facility] [address] [place] [address]    Sig: Take 1 mg by mouth every morning    Class: Historical    Route: Oral    gabapentin (NEURONTIN) 100 MG capsule  90 capsule 2 12/13/2022    [facility] [address]  [address]    Sig: Take 2 capsules (200 mg) by mouth 3 times daily    Class: E-Prescribe    Route: Oral    insulin aspart (NOVOLOG VIAL) 100 UNITS/ML vial    11/16/2022        Sig: Inject 1-10 Units Subcutaneous 4 times daily (with meals and nightly)    Class: No Print Out    Notes to Pharmacy: 0.5 unit(s) per 14g cho AC breakfast/lunch AND 0.5 unit(s) per 20 g cho for dinner AND NO meal insulin after 2000 hrs.  Snack coverage:   0800 - 1700 - 0.5 unit(s) per 14 g cho and 1701 - 2000 0.5 unit(s) per 20 g cho    Route: Subcutaneous    insulin glargine (LANTUS PEN) 100 UNIT/ML pen    12/6/2022        Sig: Inject 11 Units Subcutaneous every morning (before breakfast)    Class: No Print Out    Notes to Pharmacy: If Lantus is not covered by insurance, may substitute Basaglar or Semglee or other insulin glargine product per insurance preference at same dose and frequency.      Route: Subcutaneous    insulin pen needle (BD GRISEL U/F) 32G X 4 MM miscellaneous    6/1/2021        Sig: Inject 1 each Subcutaneous    Class: Historical    Route: Subcutaneous    levothyroxine (SYNTHROID/LEVOTHROID) 150 MCG tablet    5/23/2022        Sig: Take 1 tablet by mouth daily    Class: Historical    Route: Oral    LORazepam (ATIVAN) 0.5 MG tablet    11/28/2022        Sig: Take 0.5 mg by mouth nightly as needed For anxiety    Class: Historical    Route: Oral    magnesium oxide (MAG-OX) 400 MG tablet  270 tablet 3 12/13/2022    [facility] - [place], [place] - [address]    Sig: Take 1 tablet (400 mg) by mouth 3 times daily    Class: E-Prescribe    Route: Oral    melatonin 5 MG tablet            Sig: Take 5 mg by mouth At Bedtime    Class: Historical    Route: Oral    multivitamin CF FORMULA (DEKAS PLUS) capsule  90 capsule 3 3/1/2022    [facility] [address] [place] [facility] [address] [facility]    Sig: Take 1 capsule by mouth daily    Class: E-Prescribe    Route: Oral    mycophenolate (GENERIC EQUIVALENT) 250  MG capsule  540 capsule 3 12/13/2022    [facility] - [place], [place] - [address]    Sig: Take 3 capsules (750 mg) by mouth two times daily    Class: E-Prescribe    Notes to Pharmacy: TXP DT 11/2/2022 (Liver) TXP Dischg DT 11/10/2022 DX Liver replaced by transplant Z94.4 TX [facility] ([place], [place])    Route: Oral    ondansetron (ZOFRAN ODT) 8 MG ODT tab  30 tablet 0 12/6/2022    [facility] - [place], [place] - [address] [facility] [address]    Sig: Take 1 tablet (8 mg) by mouth every 8 hours as needed for nausea    Class: E-Prescribe    Route: Oral    polyethylene glycol (MIRALAX) 17 GM/Dose powder  510 g  11/10/2022        Sig: Take 17 g by mouth daily    Class: Transitional    Route: Oral    prochlorperazine (COMPAZINE) 10 MG tablet  10 tablet 0 12/15/2022    [facility] [address] [place] [facility] [address] [facility]    Sig: Take 0.5 tablets (5 mg) by mouth every 8 hours as needed for nausea or vomiting    Class: E-Prescribe    Route: Oral    rizatriptan (MAXALT) 10 MG tablet    11/28/2022        Sig: Take 10 mg by mouth as needed Take 1 Tablet (10 mg) by mouth as needed for Headache. at onset of migraine. May repeat in 2 hr if needed up to 30mg in 24 hr & MAX use 5 days/month. Do not use within 24 hours of an ergotamine preparation or a different triptan.    Class: Historical    Route: Oral    sitagliptin (JANUVIA) 100 MG tablet  30 tablet 0 11/17/2022    [facility] [place], [place] - [address]    Sig: Take 1 tablet (100 mg) by mouth daily    Class: E-Prescribe    Route: Oral    tacrolimus (GENERIC EQUIVALENT) 1 MG capsule  180 capsule 3 12/13/2022    [facility] - [place], [place] - [address]    Sig: Take 1 capsule (1 mg) by mouth every 12 hours Total dose 6mg BID    Class: E-Prescribe    Notes to Pharmacy: TXP DT 11/2/2022 (Liver) TXP Dischg DT 11/10/2022 DX Liver replaced  by transplant Z94.4 TX [facility] ([place], [place])    Route: Oral    tacrolimus (GENERIC EQUIVALENT) 5 MG capsule  180 capsule 3 12/13/2022    [facility] - [place], [place] - [address]    Sig: Take 1 capsule (5 mg) by mouth every 12 hours Total dose 6mg BID    Class: E-Prescribe    Notes to Pharmacy: TXP DT 11/2/2022 (Liver) TXP Dischg DT 11/10/2022 DX Liver replaced by transplant Z94.4 TX [facility] ([place], [place])    Route: Oral    topiramate (TOPAMAX) 25 MG tablet  90 tablet 2 12/6/2022    [facility] - [place], [place] - [address] [facility] [address]    Sig: Take 3 tablets (75 mg) by mouth At Bedtime    Class: E-Prescribe    Route: Oral    Renewals     Renewal requests to authorizing provider (Bhavana Romo, NP) <b>prohibited</b>          traZODone (DESYREL) 50 MG tablet    11/28/2022        Sig: Take 50 mg by mouth At Bedtime    Class: Historical    Route: Oral    valGANciclovir (VALCYTE) 450 MG tablet  90 tablet 3 12/13/2022    [facility] - [place], [place] - [address]    Sig: Take 1 tablet (450 mg) by mouth daily    Class: E-Prescribe    Route: Oral          O:   [unfilled]    Transplant Immunosuppression Labs Latest Ref Rng & Units 12/19/2022 12/15/2022 12/12/2022 12/8/2022 12/8/2022   Creat 0.52 - 1.04 mg/dL 1.11(H) 1.12(H) 1.02 - 1.06(H)   BUN 7 - 30 mg/dL 12 14.8 13 - 16.0   WBC 4.0 - 11.0 10e3/uL 3.1(L) 2.5(L) 2.6(L) 2.6(L) 2.6(L)   Neutrophil % - 71 - 61 -   ANEU 1.6 - 8.3 10e9/L - - - - -       Chemistries:   Recent Labs   Lab Test 12/19/22  0844   BUN 12   CR 1.11*   GFRESTIMATED 60*   *     Lab Results   Component Value Date    A1C 5.7 11/02/2022     Recent Labs   Lab Test 12/19/22  0844   ALBUMIN 2.9*   BILITOTAL 0.4   ALKPHOS 102   AST 11   ALT 13     Urine Studies:  Recent Labs   Lab Test 11/19/22  0937   COLOR Yellow    APPEARANCE Slightly Cloudy*   URINEGLC Negative   URINEBILI Negative   URINEKETONE Negative   SG 1.017   UBLD Negative   URINEPH 5.5   PROTEIN 20*   NITRITE Positive*   LEUKEST Large*   RBCU 3*   WBCU >182*     No lab results found.  Hematology:   Recent Labs   Lab Test 12/19/22  0844 12/15/22  0836 12/12/22  0736   HGB 8.8* 8.7* 8.7*   * 156 190   WBC 3.1* 2.5* 2.6*     Coags:   Recent Labs   Lab Test 11/04/22  0607 11/03/22  0850   INR 1.32* 1.33*     HLA antibodies:   No results found for: [id number], [id number], [id number], [id number]    Assessment: Susan Puckett is doing fairly well s/p Liver Tx:  Issues we addressed during her visit include:    Plan:    1. Graft function: LFTS normalizing  2. Immunosuppression Management: No change tac 8-12 closer to 8 given Headaches and normal LFTs.  Complexity of management:Low.  Contributing factors: headache  3. HTN- pt will monnitor and bring in log but may need to start anti-HTN  4. Headache- has had work up, may be migraine, will see neurology soon  Followup: 2 weeks            Delbert Morgan MD/PhD

## 2022-12-19 ENCOUNTER — LAB (OUTPATIENT)
Dept: LAB | Facility: CLINIC | Age: 52
End: 2022-12-19
Payer: COMMERCIAL

## 2022-12-19 ENCOUNTER — TELEPHONE (OUTPATIENT)
Dept: TRANSPLANT | Facility: CLINIC | Age: 52
End: 2022-12-19

## 2022-12-19 DIAGNOSIS — Z94.4 LIVER REPLACED BY TRANSPLANT (H): ICD-10-CM

## 2022-12-19 DIAGNOSIS — Z94.4 LIVER REPLACED BY TRANSPLANT (H): Primary | ICD-10-CM

## 2022-12-19 LAB
ALBUMIN SERPL-MCNC: 2.9 G/DL (ref 3.4–5)
ALP SERPL-CCNC: 102 U/L (ref 40–150)
ALT SERPL W P-5'-P-CCNC: 13 U/L (ref 0–50)
ANION GAP SERPL CALCULATED.3IONS-SCNC: 4 MMOL/L (ref 3–14)
AST SERPL W P-5'-P-CCNC: 11 U/L (ref 0–45)
BILIRUB DIRECT SERPL-MCNC: 0.2 MG/DL (ref 0–0.2)
BILIRUB SERPL-MCNC: 0.4 MG/DL (ref 0.2–1.3)
BUN SERPL-MCNC: 12 MG/DL (ref 7–30)
CALCIUM SERPL-MCNC: 8.2 MG/DL (ref 8.5–10.1)
CHLORIDE BLD-SCNC: 116 MMOL/L (ref 94–109)
CO2 SERPL-SCNC: 22 MMOL/L (ref 20–32)
CREAT SERPL-MCNC: 1.11 MG/DL (ref 0.52–1.04)
ERYTHROCYTE [DISTWIDTH] IN BLOOD BY AUTOMATED COUNT: 15 % (ref 10–15)
GFR SERPL CREATININE-BSD FRML MDRD: 60 ML/MIN/1.73M2
GLUCOSE BLD-MCNC: 176 MG/DL (ref 70–99)
HCT VFR BLD AUTO: 28.6 % (ref 35–47)
HGB BLD-MCNC: 8.8 G/DL (ref 11.7–15.7)
HOLD SPECIMEN: NORMAL
MAGNESIUM SERPL-MCNC: 1.9 MG/DL (ref 1.6–2.3)
MCH RBC QN AUTO: 30.3 PG (ref 26.5–33)
MCHC RBC AUTO-ENTMCNC: 30.8 G/DL (ref 31.5–36.5)
MCV RBC AUTO: 99 FL (ref 78–100)
PHOSPHATE SERPL-MCNC: 4.3 MG/DL (ref 2.5–4.5)
PLATELET # BLD AUTO: 146 10E3/UL (ref 150–450)
POTASSIUM BLD-SCNC: 5 MMOL/L (ref 3.4–5.3)
PROT SERPL-MCNC: 5.9 G/DL (ref 6.8–8.8)
RBC # BLD AUTO: 2.9 10E6/UL (ref 3.8–5.2)
SODIUM SERPL-SCNC: 142 MMOL/L (ref 133–144)
TACROLIMUS BLD-MCNC: 8.9 UG/L (ref 5–15)
TME LAST DOSE: NORMAL H
TME LAST DOSE: NORMAL H
WBC # BLD AUTO: 3.1 10E3/UL (ref 4–11)

## 2022-12-19 PROCEDURE — 82248 BILIRUBIN DIRECT: CPT

## 2022-12-19 PROCEDURE — 83735 ASSAY OF MAGNESIUM: CPT

## 2022-12-19 PROCEDURE — 84100 ASSAY OF PHOSPHORUS: CPT

## 2022-12-19 PROCEDURE — 85027 COMPLETE CBC AUTOMATED: CPT

## 2022-12-19 PROCEDURE — 36415 COLL VENOUS BLD VENIPUNCTURE: CPT

## 2022-12-19 PROCEDURE — 80197 ASSAY OF TACROLIMUS: CPT

## 2022-12-19 PROCEDURE — 80053 COMPREHEN METABOLIC PANEL: CPT

## 2022-12-19 NOTE — TELEPHONE ENCOUNTER
[facility] called requesting refill order for Bumex. I let them know I spoke to Dr Morgan who doesn't think patient needs to be on Bumex. Refill request denied.

## 2022-12-21 ENCOUNTER — HOSPITAL ENCOUNTER (OUTPATIENT)
Dept: OCCUPATIONAL THERAPY | Facility: CLINIC | Age: 52
Setting detail: THERAPIES SERIES
Discharge: HOME | End: 2022-12-21
Attending: INTERNAL MEDICINE
Payer: COMMERCIAL

## 2022-12-21 PROCEDURE — 97140 MANUAL THERAPY 1/> REGIONS: CPT | Mod: GO

## 2022-12-22 ENCOUNTER — LAB (OUTPATIENT)
Dept: LAB | Facility: CLINIC | Age: 52
End: 2022-12-22
Payer: COMMERCIAL

## 2022-12-22 DIAGNOSIS — Z94.4 LIVER REPLACED BY TRANSPLANT (H): ICD-10-CM

## 2022-12-22 LAB
ALBUMIN SERPL-MCNC: 2.9 G/DL (ref 3.4–5)
ALP SERPL-CCNC: 84 U/L (ref 40–150)
ALT SERPL W P-5'-P-CCNC: 13 U/L (ref 0–50)
ANION GAP SERPL CALCULATED.3IONS-SCNC: 2 MMOL/L (ref 3–14)
AST SERPL W P-5'-P-CCNC: 10 U/L (ref 0–45)
BILIRUB DIRECT SERPL-MCNC: 0.2 MG/DL (ref 0–0.2)
BILIRUB SERPL-MCNC: 0.3 MG/DL (ref 0.2–1.3)
BUN SERPL-MCNC: 12 MG/DL (ref 7–30)
CALCIUM SERPL-MCNC: 8.5 MG/DL (ref 8.5–10.1)
CHLORIDE BLD-SCNC: 118 MMOL/L (ref 94–109)
CO2 SERPL-SCNC: 25 MMOL/L (ref 20–32)
CREAT SERPL-MCNC: 1.01 MG/DL (ref 0.52–1.04)
ERYTHROCYTE [DISTWIDTH] IN BLOOD BY AUTOMATED COUNT: 14.7 % (ref 10–15)
GFR SERPL CREATININE-BSD FRML MDRD: 67 ML/MIN/1.73M2
GLUCOSE BLD-MCNC: 120 MG/DL (ref 70–99)
HCT VFR BLD AUTO: 29.3 % (ref 35–47)
HGB BLD-MCNC: 8.8 G/DL (ref 11.7–15.7)
HOLD SPECIMEN: NORMAL
MAGNESIUM SERPL-MCNC: 1.6 MG/DL (ref 1.6–2.3)
MCH RBC QN AUTO: 29.7 PG (ref 26.5–33)
MCHC RBC AUTO-ENTMCNC: 30 G/DL (ref 31.5–36.5)
MCV RBC AUTO: 99 FL (ref 78–100)
PHOSPHATE SERPL-MCNC: 4.4 MG/DL (ref 2.5–4.5)
PLATELET # BLD AUTO: 149 10E3/UL (ref 150–450)
POTASSIUM BLD-SCNC: 5.3 MMOL/L (ref 3.4–5.3)
PROT SERPL-MCNC: 5.9 G/DL (ref 6.8–8.8)
RBC # BLD AUTO: 2.96 10E6/UL (ref 3.8–5.2)
SODIUM SERPL-SCNC: 145 MMOL/L (ref 133–144)
TACROLIMUS BLD-MCNC: 10.1 UG/L (ref 5–15)
TME LAST DOSE: NORMAL H
TME LAST DOSE: NORMAL H
WBC # BLD AUTO: 2.6 10E3/UL (ref 4–11)

## 2022-12-22 PROCEDURE — 80197 ASSAY OF TACROLIMUS: CPT

## 2022-12-22 PROCEDURE — 36415 COLL VENOUS BLD VENIPUNCTURE: CPT

## 2022-12-22 PROCEDURE — 84100 ASSAY OF PHOSPHORUS: CPT

## 2022-12-22 PROCEDURE — 80053 COMPREHEN METABOLIC PANEL: CPT

## 2022-12-22 PROCEDURE — 83735 ASSAY OF MAGNESIUM: CPT

## 2022-12-22 PROCEDURE — 82248 BILIRUBIN DIRECT: CPT

## 2022-12-22 PROCEDURE — 85027 COMPLETE CBC AUTOMATED: CPT

## 2022-12-22 NOTE — PROGRESS NOTES
Transplant Surgery Progress Note    Transplants:  11/2/2022 (Liver); Postoperative day:  50  S: overall doing better, still has some occasional headache but improved since recent admission, denies f/c/n/v/d    Transplant History:    Transplant Type:  Liver Tx  Donor Type:    Transplant Date:  11/2/2022 (Liver)   Biliary Stent:  No       Acute Rejection Hx:  No    Present Maintenance Immunosuppression:  Tacrolimus and Mycophenolate mofetil    CMV IgG Ab Discordance:  No  EBV IgG Ab Discordance:  No    Transplant Coordinator: Edwina Fallon     Transplant Office Phone Number: [phone]     Immunosuppressant Medications     Immunosuppressive Agents Disp Start End     mycophenolate (GENERIC EQUIVALENT) 250 MG capsule    540 capsule 12/13/2022     Sig - Route: Take 3 capsules (750 mg) by mouth two times daily - Oral    Class: E-Prescribe    Notes to Pharmacy: TXP DT 11/2/2022 (Liver) TXP Dischg DT 11/10/2022 DX Liver replaced by transplant Z94.4 TX [facility] ([place], [place])     tacrolimus (GENERIC EQUIVALENT) 1 MG capsule    180 capsule 12/13/2022     Sig - Route: Take 1 capsule (1 mg) by mouth every 12 hours Total dose 6mg BID - Oral    Class: E-Prescribe    Notes to Pharmacy: TXP DT 11/2/2022 (Liver) TXP Dischg DT 11/10/2022 DX Liver replaced by transplant Z94.4 TX [facility] ([place], [place])     tacrolimus (GENERIC EQUIVALENT) 5 MG capsule    180 capsule 12/13/2022     Sig - Route: Take 1 capsule (5 mg) by mouth every 12 hours Total dose 6mg BID - Oral    Class: E-Prescribe    Notes to Pharmacy: TXP DT 11/2/2022 (Liver) TXP Dischg DT 11/10/2022 DX Liver replaced by transplant Z94.4 TX [facility] ([place], [place])          Possible Immunosuppression-related side effects:   [x]             headache  []             vivid dreams  []             irritability  []              cognitive difficuties  []             fine tremor  []             nausea  []             diarrhea  []             neuropathy      []             edema  []             renal calcineurin toxicity  []             hyperkalemia  []             post-transplant diabetes  []             decreased appetite  []             increased appetite  []             other:  []             none    Prescription Medications as of 12/22/2022       Rx Number Disp Refills Start End Last Dispensed Date Next Fill Date Owning Pharmacy    acetaminophen (TYLENOL) 325 MG tablet  100 tablet 0 12/12/2022    [facility] [address]    Sig: Take 3 tablets (975 mg) by mouth every 8 hours as needed for mild pain    Class: E-Prescribe    Route: Oral    amylase-lipase-protease (CREON 24) 30983-37134 units CPEP per EC capsule  180 capsule 3 11/18/2022    [facility] [address] [address]    Sig: Take 2 capsules by mouth 3 times daily (with meals)    Class: E-Prescribe    Route: Oral    amylase-lipase-protease (CREON 24) 20219-89609 units CPEP per EC capsule  90 capsule 3 11/18/2022    [facility] [address] [address]    Sig: Take 1 capsule by mouth Take with snacks or supplements (with snacks)    Class: E-Prescribe    Route: Oral    aspirin (ASA) 81 MG chewable tablet  90 tablet 3 11/18/2022    [facility] [address] [address]    Sig: Take 1 tablet (81 mg) by mouth daily    Class: E-Prescribe    Route: Oral    calcium carbonate-vitamin D (OSCAL) 500-5 MG-MCG tablet    12/1/2022        Sig: Take 1 tablet by mouth 2 times daily    Class: Historical    Route: Oral    capsaicin 0.035 % CREA            Sig: Externally apply topically 3 times daily    Class: Historical    Route: Apply externally    Continuous Blood Gluc  (DEXCOM G6 ) LUNA    6/1/2021        Sig: Use as directed for  continuous glucose monitoring.    Class: Historical    Continuous Blood Gluc Sensor (DEXCOM G6 SENSOR) MISC    6/1/2021        Sig: Use as directed for continuous glucose monitoring.  Change sensor every 10 days.    Class: Historical    Continuous Blood Gluc Transmit (DEXCOM G6 TRANSMITTER) MISC    6/1/2021        Sig: Use as directed for continuous glucose monitoring.  Change every 3 months.    Class: Historical    cyanocobalamin (VITAMIN B-12) 1000 MCG tablet    9/2/2020    [facility] [address] [place] [id number] [facility]    Sig: Take 1,000 mcg by mouth every 7 days Take 1 tablet by mouth every 7 days on Wednesdays    Class: Historical    Route: Oral    cyclobenzaprine (FLEXERIL) 10 MG tablet  30 tablet 0 12/6/2022    [place], [place] - [address]    Sig: Take 1 tablet (10 mg) by mouth every 8 hours as needed for muscle spasms    Class: E-Prescribe    Route: Oral    Renewals     Renewal requests to authorizing provider (Bhavana Romo NP) <b>prohibited</b>          dapsone (ACZONE) 25 MG tablet  180 tablet 1 12/13/2022    [facility]    Sig: Take 2 tablets (50 mg) by mouth daily    Class: E-Prescribe    Route: Oral    escitalopram (LEXAPRO) 20 MG tablet    11/28/2022        Sig: Take 20 mg by mouth daily    Class: Historical    Route: Oral    folic acid (FOLVITE) 1 MG tablet    2/26/2020    [facility] [address] [place] [facility] [address] [facility]    Sig: Take 1 mg by mouth every morning    Class: Historical    Route: Oral    gabapentin (NEURONTIN) 100 MG capsule  90 capsule 2 12/13/2022    [facility]    Sig: Take 2 capsules (200 mg) by mouth 3 times daily    Class: E-Prescribe    Route: Oral    insulin aspart (NOVOLOG VIAL) 100 UNITS/ML vial    11/16/2022        Sig: Inject 1-10 Units Subcutaneous 4 times daily (with meals and  nightly)    Class: No Print Out    Notes to Pharmacy: 0.5 unit(s) per 14g cho AC breakfast/lunch AND 0.5 unit(s) per 20 g cho for dinner AND NO meal insulin after 2000 hrs.  Snack coverage:   0800 - 1700 - 0.5 unit(s) per 14 g cho and 1701 - 2000 0.5 unit(s) per 20 g cho    Route: Subcutaneous    insulin glargine (LANTUS PEN) 100 UNIT/ML pen    12/6/2022        Sig: Inject 11 Units Subcutaneous every morning (before breakfast)    Class: No Print Out    Notes to Pharmacy: If Lantus is not covered by insurance, may substitute Basaglar or Semglee or other insulin glargine product per insurance preference at same dose and frequency.      Route: Subcutaneous    insulin pen needle (BD GRISEL U/F) 32G X 4 MM miscellaneous    6/1/2021        Sig: Inject 1 each Subcutaneous    Class: Historical    Route: Subcutaneous    levothyroxine (SYNTHROID/LEVOTHROID) 150 MCG tablet    5/23/2022        Sig: Take 1 tablet by mouth daily    Class: Historical    Route: Oral    LORazepam (ATIVAN) 0.5 MG tablet    11/28/2022        Sig: Take 0.5 mg by mouth nightly as needed For anxiety    Class: Historical    Route: Oral    magnesium oxide (MAG-OX) 400 MG tablet  270 tablet 3 12/13/2022    [facility] [place] [address] [address]    Sig: Take 1 tablet (400 mg) by mouth 3 times daily    Class: E-Prescribe    Route: Oral    melatonin 5 MG tablet            Sig: Take 5 mg by mouth At Bedtime    Class: Historical    Route: Oral    multivitamin CF FORMULA (DEKAS PLUS) capsule  90 capsule 3 3/1/2022    [facility] [address] [place] [facility] [address] [facility]    Sig: Take 1 capsule by mouth daily    Class: E-Prescribe    Route: Oral    mycophenolate (GENERIC EQUIVALENT) 250 MG capsule  540 capsule 3 12/13/2022    [facility] [place] [address] [address]    Sig: Take 3 capsules (750 mg) by mouth two times daily    Class: E-Prescribe    Notes to Pharmacy: TXP DT 11/2/2022 (Liver) TXP  Dischg DT 11/10/2022 DX Liver replaced by transplant Z94.4 TX [facility] ([place], [place])    Route: Oral    ondansetron (ZOFRAN ODT) 8 MG ODT tab  30 tablet 0 12/6/2022    [facility] - [place], [place] - [address]    Sig: Take 1 tablet (8 mg) by mouth every 8 hours as needed for nausea    Class: E-Prescribe    Route: Oral    polyethylene glycol (MIRALAX) 17 GM/Dose powder  510 g  11/10/2022        Sig: Take 17 g by mouth daily    Class: Transitional    Route: Oral    prochlorperazine (COMPAZINE) 10 MG tablet  10 tablet 0 12/15/2022    [facility] [address] [address] [facility] - [place], [place] - [address] [facility]    Sig: Take 0.5 tablets (5 mg) by mouth every 8 hours as needed for nausea or vomiting    Class: E-Prescribe    Route: Oral    rizatriptan (MAXALT) 10 MG tablet    11/28/2022        Sig: Take 10 mg by mouth as needed Take 1 Tablet (10 mg) by mouth as needed for Headache. at onset of migraine. May repeat in 2 hr if needed up to 30mg in 24 hr & MAX use 5 days/month. Do not use within 24 hours of an ergotamine preparation or a different triptan.    Class: Historical    Route: Oral    sitagliptin (JANUVIA) 100 MG tablet  30 tablet 0 11/17/2022    [facility] [place], [place] - [address]    Sig: Take 1 tablet (100 mg) by mouth daily    Class: E-Prescribe    Route: Oral    tacrolimus (GENERIC EQUIVALENT) 1 MG capsule  180 capsule 3 12/13/2022    [facility] [place], [place] - [address]    Sig: Take 1 capsule (1 mg) by mouth every 12 hours Total dose 6mg BID    Class: E-Prescribe    Notes to Pharmacy: TXP DT 11/2/2022 (Liver) TXP Dischg DT 11/10/2022 DX Liver replaced by transplant Z94.4 TX [facility] ([place], [place])    Route: Oral    tacrolimus (GENERIC EQUIVALENT) 5 MG capsule  180 capsule 3 12/13/2022    [facility] [place], [place] -  [address]    Sig: Take 1 capsule (5 mg) by mouth every 12 hours Total dose 6mg BID    Class: E-Prescribe    Notes to Pharmacy: TXP DT 11/2/2022 (Liver) TXP Dischg DT 11/10/2022 DX Liver replaced by transplant Z94.4 TX [facility] ([place], [place])    Route: Oral    topiramate (TOPAMAX) 25 MG tablet  90 tablet 2 12/6/2022    [facility] - [place], [place] - [address]    Sig: Take 3 tablets (75 mg) by mouth At Bedtime    Class: E-Prescribe    Route: Oral    Renewals     Renewal requests to authorizing provider (Bhavana Romo NP) <b>prohibited</b>          traZODone (DESYREL) 50 MG tablet    11/28/2022        Sig: Take 50 mg by mouth At Bedtime    Class: Historical    Route: Oral    valGANciclovir (VALCYTE) 450 MG tablet  90 tablet 3 12/13/2022    [facility] - [place], [place] - [address]    Sig: Take 1 tablet (450 mg) by mouth daily    Class: E-Prescribe    Route: Oral          O:   [unfilled]    Transplant Immunosuppression Labs Latest Ref Rng & Units 12/19/2022 12/15/2022 12/12/2022 12/8/2022 12/8/2022   Creat 0.52 - 1.04 mg/dL 1.11(H) 1.12(H) 1.02 - 1.06(H)   BUN 7 - 30 mg/dL 12 14.8 13 - 16.0   WBC 4.0 - 11.0 10e3/uL 3.1(L) 2.5(L) 2.6(L) 2.6(L) 2.6(L)   Neutrophil % - 71 - 61 -   ANEU 1.6 - 8.3 10e9/L - - - - -       Chemistries:   Recent Labs   Lab Test 12/19/22  0844   BUN 12   CR 1.11*   GFRESTIMATED 60*   *     Lab Results   Component Value Date    A1C 5.7 11/02/2022     Recent Labs   Lab Test 12/19/22  0844   ALBUMIN 2.9*   BILITOTAL 0.4   ALKPHOS 102   AST 11   ALT 13     Urine Studies:  Recent Labs   Lab Test 11/19/22  0937   COLOR Yellow   APPEARANCE Slightly Cloudy*   URINEGLC Negative   URINEBILI Negative   URINEKETONE Negative   SG 1.017   UBLD Negative   URINEPH 5.5   PROTEIN 20*   NITRITE Positive*   LEUKEST Large*   RBCU 3*   WBCU >182*     No lab results found.  Hematology:    Recent Labs   Lab Test 12/19/22  0844 12/15/22  0836 12/12/22  0736   HGB 8.8* 8.7* 8.7*   * 156 190   WBC 3.1* 2.5* 2.6*     Coags:   Recent Labs   Lab Test 11/04/22  0607 11/03/22  0850   INR 1.32* 1.33*     HLA antibodies:   No results found for: [id number], [id number], [id number], [id number]    Assessment: Susan Puckett is doing fairly well s/p Liver Tx:  Issues we addressed during her visit include:    Plan:    1. Graft function: LFTS normalizing  2. Immunosuppression Management: No change tac 8-12 closer to 8 given Headaches and normal LFTs.  Complexity of management:Low.  Contributing factors: headache  3. HTN- pt will monnitor and bring in log but may need to start anti-HTN  4. Headache- has had work up, may be migraine, will see neurology soon  Followup: 2 weeks            Delbert Morgan MD/PhD

## 2022-12-23 ENCOUNTER — TELEPHONE (OUTPATIENT)
Dept: TRANSPLANT | Facility: CLINIC | Age: 52
End: 2022-12-23

## 2022-12-23 DIAGNOSIS — Z94.4 LIVER REPLACED BY TRANSPLANT (H): ICD-10-CM

## 2022-12-23 DIAGNOSIS — Z94.4 LIVER TRANSPLANT RECIPIENT (H): ICD-10-CM

## 2022-12-23 RX ORDER — TACROLIMUS 5 MG/1
5 CAPSULE ORAL EVERY 12 HOURS
Qty: 180 CAPSULE | Refills: 3 | Status: SHIPPED | OUTPATIENT
Start: 2022-12-23 | End: 2022-12-27

## 2022-12-23 RX ORDER — TACROLIMUS 1 MG/1
1 CAPSULE ORAL EVERY EVENING
Qty: 90 CAPSULE | Refills: 3 | Status: SHIPPED | OUTPATIENT
Start: 2022-12-23 | End: 2022-12-30

## 2022-12-23 NOTE — TELEPHONE ENCOUNTER
ISSUE:   Tacrolimus IR level 10.1 on 12/22, goal 8-10, dose 6 mg BID.    PLAN:   Please call patient and confirm this was an accurate 12-hour trough. Verify Tacrolimus IR dose 6 mg BID. Confirm no new medications or illness. Confirm no missed doses. If accurate trough and accurate dose, decrease Tacrolimus IR dose to 5 mg AM, 6 mg PM and repeat labs on Monday as scheduled.    Edwina Fallon RN      OUTCOME:   Spoke with patient, they confirm accurate trough level and current dose 6 mg BID. Patient confirmed dose change to 5 mg am, 6 mg pm and to repeat labs in 3 days. Orders sent to preferred pharmacy for dose change and lab for repeat labs. Patient voiced understanding of plan.    Ariana Liao LPN

## 2022-12-26 ENCOUNTER — LAB (OUTPATIENT)
Dept: LAB | Facility: CLINIC | Age: 52
End: 2022-12-26
Payer: COMMERCIAL

## 2022-12-26 DIAGNOSIS — K74.60 LIVER CIRRHOSIS SECONDARY TO NASH (H): ICD-10-CM

## 2022-12-26 DIAGNOSIS — K75.81 LIVER CIRRHOSIS SECONDARY TO NASH (H): ICD-10-CM

## 2022-12-26 DIAGNOSIS — Z94.4 LIVER REPLACED BY TRANSPLANT (H): ICD-10-CM

## 2022-12-26 LAB
AFP SERPL-MCNC: 1.8 NG/ML
ALBUMIN SERPL-MCNC: 2.9 G/DL (ref 3.4–5)
ALP SERPL-CCNC: 81 U/L (ref 40–150)
ALT SERPL W P-5'-P-CCNC: 12 U/L (ref 0–50)
ANION GAP SERPL CALCULATED.3IONS-SCNC: 2 MMOL/L (ref 3–14)
AST SERPL W P-5'-P-CCNC: 14 U/L (ref 0–45)
BILIRUB DIRECT SERPL-MCNC: 0.1 MG/DL (ref 0–0.2)
BILIRUB SERPL-MCNC: 0.3 MG/DL (ref 0.2–1.3)
BUN SERPL-MCNC: 19 MG/DL (ref 7–30)
CALCIUM SERPL-MCNC: 8.3 MG/DL (ref 8.5–10.1)
CHLORIDE BLD-SCNC: 117 MMOL/L (ref 94–109)
CO2 SERPL-SCNC: 23 MMOL/L (ref 20–32)
CREAT SERPL-MCNC: 1.15 MG/DL (ref 0.52–1.04)
ERYTHROCYTE [DISTWIDTH] IN BLOOD BY AUTOMATED COUNT: 14.8 % (ref 10–15)
GFR SERPL CREATININE-BSD FRML MDRD: 58 ML/MIN/1.73M2
GLUCOSE BLD-MCNC: 238 MG/DL (ref 70–99)
HCT VFR BLD AUTO: 27.9 % (ref 35–47)
HGB BLD-MCNC: 8.5 G/DL (ref 11.7–15.7)
MAGNESIUM SERPL-MCNC: 1.8 MG/DL (ref 1.6–2.3)
MCH RBC QN AUTO: 30.5 PG (ref 26.5–33)
MCHC RBC AUTO-ENTMCNC: 30.5 G/DL (ref 31.5–36.5)
MCV RBC AUTO: 100 FL (ref 78–100)
PHOSPHATE SERPL-MCNC: 4.2 MG/DL (ref 2.5–4.5)
PLATELET # BLD AUTO: 131 10E3/UL (ref 150–450)
POTASSIUM BLD-SCNC: 5.6 MMOL/L (ref 3.4–5.3)
PROT SERPL-MCNC: 5.8 G/DL (ref 6.8–8.8)
RBC # BLD AUTO: 2.79 10E6/UL (ref 3.8–5.2)
SODIUM SERPL-SCNC: 142 MMOL/L (ref 133–144)
TACROLIMUS BLD-MCNC: 9.9 UG/L (ref 5–15)
TME LAST DOSE: NORMAL H
TME LAST DOSE: NORMAL H
WBC # BLD AUTO: 3.2 10E3/UL (ref 4–11)

## 2022-12-26 PROCEDURE — 85027 COMPLETE CBC AUTOMATED: CPT

## 2022-12-26 PROCEDURE — 80053 COMPREHEN METABOLIC PANEL: CPT

## 2022-12-26 PROCEDURE — 82105 ALPHA-FETOPROTEIN SERUM: CPT

## 2022-12-26 PROCEDURE — 80197 ASSAY OF TACROLIMUS: CPT

## 2022-12-26 PROCEDURE — 36415 COLL VENOUS BLD VENIPUNCTURE: CPT

## 2022-12-26 PROCEDURE — 84100 ASSAY OF PHOSPHORUS: CPT

## 2022-12-26 PROCEDURE — 82248 BILIRUBIN DIRECT: CPT

## 2022-12-26 PROCEDURE — 83735 ASSAY OF MAGNESIUM: CPT

## 2022-12-27 ENCOUNTER — TELEPHONE (OUTPATIENT)
Dept: TRANSPLANT | Facility: CLINIC | Age: 52
End: 2022-12-27

## 2022-12-27 DIAGNOSIS — Z94.4 LIVER TRANSPLANT RECIPIENT (H): ICD-10-CM

## 2022-12-27 RX ORDER — TACROLIMUS 5 MG/1
5 CAPSULE ORAL EVERY 12 HOURS
Qty: 180 CAPSULE | Refills: 3 | Status: SHIPPED | OUTPATIENT
Start: 2022-12-27 | End: 2022-12-30

## 2022-12-27 NOTE — TELEPHONE ENCOUNTER
ISSUE:   Tacrolimus IR level 9.9 on 12/26, goal 8-10 (creat, K elevated), dose 5 mg AM, 6 mg PM.    PLAN:   Please call patient and confirm this was an accurate 12-hour trough. Verify Tacrolimus IR dose 5 mg AM, 6 mg PM. Confirm no new medications or illness. Confirm no missed doses. If accurate trough and accurate dose, decrease Tacrolimus IR dose to 5 mg BID and repeat labs on Thursday. Please increase water intake and avoid foods high in K.    Edwina Fallon RN      OUTCOME:   Spoke with patient, they confirm accurate trough level and current dose 5 mg am, 6 mg pm. Patient confirmed dose change to 5 mg BID and to repeat labs in this thursday . Orders sent to preferred pharmacy for dose change and lab for repeat labs. Patient voiced understanding of plan.     Ariana Liao LPN

## 2022-12-29 ENCOUNTER — LAB (OUTPATIENT)
Dept: LAB | Facility: CLINIC | Age: 52
End: 2022-12-29
Payer: COMMERCIAL

## 2022-12-29 ENCOUNTER — OFFICE VISIT (OUTPATIENT)
Dept: TRANSPLANT | Facility: CLINIC | Age: 52
End: 2022-12-29
Attending: TRANSPLANT SURGERY
Payer: COMMERCIAL

## 2022-12-29 VITALS
WEIGHT: 166.4 LBS | TEMPERATURE: 98.1 F | DIASTOLIC BLOOD PRESSURE: 86 MMHG | OXYGEN SATURATION: 100 % | SYSTOLIC BLOOD PRESSURE: 128 MMHG | HEART RATE: 64 BPM | BODY MASS INDEX: 26.86 KG/M2

## 2022-12-29 DIAGNOSIS — Z94.4 LIVER REPLACED BY TRANSPLANT (H): ICD-10-CM

## 2022-12-29 LAB
ALBUMIN SERPL-MCNC: 3 G/DL (ref 3.4–5)
ALP SERPL-CCNC: 78 U/L (ref 40–150)
ALT SERPL W P-5'-P-CCNC: 15 U/L (ref 0–50)
ANION GAP SERPL CALCULATED.3IONS-SCNC: 4 MMOL/L (ref 3–14)
AST SERPL W P-5'-P-CCNC: 14 U/L (ref 0–45)
BILIRUB DIRECT SERPL-MCNC: 0.1 MG/DL (ref 0–0.2)
BILIRUB SERPL-MCNC: 0.4 MG/DL (ref 0.2–1.3)
BUN SERPL-MCNC: 13 MG/DL (ref 7–30)
CALCIUM SERPL-MCNC: 8.4 MG/DL (ref 8.5–10.1)
CHLORIDE BLD-SCNC: 116 MMOL/L (ref 94–109)
CO2 SERPL-SCNC: 24 MMOL/L (ref 20–32)
CREAT SERPL-MCNC: 1.24 MG/DL (ref 0.52–1.04)
ERYTHROCYTE [DISTWIDTH] IN BLOOD BY AUTOMATED COUNT: 14.5 % (ref 10–15)
GFR SERPL CREATININE-BSD FRML MDRD: 53 ML/MIN/1.73M2
GLUCOSE BLD-MCNC: 193 MG/DL (ref 70–99)
HCT VFR BLD AUTO: 30.3 % (ref 35–47)
HGB BLD-MCNC: 9.3 G/DL (ref 11.7–15.7)
MAGNESIUM SERPL-MCNC: 1.5 MG/DL (ref 1.6–2.3)
MCH RBC QN AUTO: 30.3 PG (ref 26.5–33)
MCHC RBC AUTO-ENTMCNC: 30.7 G/DL (ref 31.5–36.5)
MCV RBC AUTO: 99 FL (ref 78–100)
PHOSPHATE SERPL-MCNC: 4.6 MG/DL (ref 2.5–4.5)
PLATELET # BLD AUTO: 142 10E3/UL (ref 150–450)
POTASSIUM BLD-SCNC: 5.3 MMOL/L (ref 3.4–5.3)
PROT SERPL-MCNC: 6.1 G/DL (ref 6.8–8.8)
RBC # BLD AUTO: 3.07 10E6/UL (ref 3.8–5.2)
SODIUM SERPL-SCNC: 144 MMOL/L (ref 133–144)
TACROLIMUS BLD-MCNC: 9.6 UG/L (ref 5–15)
TME LAST DOSE: NORMAL H
TME LAST DOSE: NORMAL H
WBC # BLD AUTO: 2.5 10E3/UL (ref 4–11)

## 2022-12-29 PROCEDURE — 83735 ASSAY OF MAGNESIUM: CPT

## 2022-12-29 PROCEDURE — 85027 COMPLETE CBC AUTOMATED: CPT

## 2022-12-29 PROCEDURE — 80053 COMPREHEN METABOLIC PANEL: CPT

## 2022-12-29 PROCEDURE — G0463 HOSPITAL OUTPT CLINIC VISIT: HCPCS | Performed by: TRANSPLANT SURGERY

## 2022-12-29 PROCEDURE — 80197 ASSAY OF TACROLIMUS: CPT

## 2022-12-29 PROCEDURE — 84100 ASSAY OF PHOSPHORUS: CPT

## 2022-12-29 PROCEDURE — 36415 COLL VENOUS BLD VENIPUNCTURE: CPT

## 2022-12-29 PROCEDURE — 82248 BILIRUBIN DIRECT: CPT

## 2022-12-29 PROCEDURE — 99213 OFFICE O/P EST LOW 20 MIN: CPT | Performed by: TRANSPLANT SURGERY

## 2022-12-29 RX ORDER — MYCOPHENOLATE MOFETIL 250 MG/1
500 CAPSULE ORAL
Qty: 540 CAPSULE | Refills: 3 | Status: SHIPPED | OUTPATIENT
Start: 2022-12-29 | End: 2023-01-26

## 2022-12-29 ASSESSMENT — PAIN SCALES - GENERAL: PAINLEVEL: SEVERE PAIN (7)

## 2022-12-29 NOTE — LETTER
12/29/2022         RE: Susan Puckett  [address] [facility] [id number]        Dear Colleague,    Thank you for referring your patient, Susan Puckett, to the [facility]. Please see a copy of my visit note below.    Transplant Surgery Progress Note    Transplants:  11/2/2022 (Liver); Postoperative day:  57  S: overall improving, waiting for appt to see neurology re headaches which aren't as bad lately    Transplant History:    Transplant Type:  Liver Tx  Donor Type:    Transplant Date:  11/2/2022 (Liver)   Biliary Stent:  No    Acute Rejection Hx:  No    Present Maintenance Immunosuppression:  Tacrolimus and Mycophenolate mofetil    CMV IgG Ab Discordance:  No  EBV IgG Ab Discordance:  No      Transplant Coordinator: Edwina Fallon     Transplant Office Phone Number: [phone]     Immunosuppressant Medications     Immunosuppressive Agents Disp Start End     mycophenolate (GENERIC EQUIVALENT) 250 MG capsule    540 capsule 12/29/2022     Sig - Route: Take 2 capsules (500 mg) by mouth two times daily - Oral    Class: E-Prescribe     tacrolimus (GENERIC EQUIVALENT) 1 MG capsule    360 capsule 12/30/2022     Sig - Route: Take 4 capsules (4 mg) by mouth every morning Total dose 4 mg AM, 5 mg PM - Oral    Class: E-Prescribe    Notes to Pharmacy: TXP DT 11/2/2022 (Liver) TXP Dischg DT 11/10/2022 DX Liver replaced by transplant Z94.4 TX [facility] ([place], [place])     tacrolimus (GENERIC EQUIVALENT) 5 MG capsule    90 capsule 12/30/2022     Sig - Route: Take 1 capsule (5 mg) by mouth every evening Total dose 4 mg AM, 5 mg PM - Oral    Class: E-Prescribe    Notes to Pharmacy: TXP DT 11/2/2022 (Liver) TXP Dischg DT 11/10/2022 DX Liver replaced by transplant Z94.4 TX [facility] ([place], [place])          Possible Immunosuppression-related side effects:   []             headache  []              vivid dreams  []             irritability  []             cognitive difficuties  []             fine tremor  []             nausea  []             diarrhea  []             neuropathy      []             edema  []             renal calcineurin toxicity  []             hyperkalemia  []             post-transplant diabetes  []             decreased appetite  []             increased appetite  []             other:  []             none    Prescription Medications as of 12/30/2022       Rx Number Disp Refills Start End Last Dispensed Date Next Fill Date Owning Pharmacy    acetaminophen (TYLENOL) 325 MG tablet  100 tablet 0 12/12/2022    [facility] [address] [address]    Sig: Take 3 tablets (975 mg) by mouth every 8 hours as needed for mild pain    Class: E-Prescribe    Route: Oral    amylase-lipase-protease (CREON 24) 21482-72263 units CPEP per EC capsule  180 capsule 3 11/18/2022    [facility] [address] [address]    Sig: Take 2 capsules by mouth 3 times daily (with meals)    Class: E-Prescribe    Route: Oral    amylase-lipase-protease (CREON 24) 73687-25632 units CPEP per EC capsule  90 capsule 3 11/18/2022    [facility] [address] [address]    Sig: Take 1 capsule by mouth Take with snacks or supplements (with snacks)    Class: E-Prescribe    Route: Oral    aspirin (ASA) 81 MG chewable tablet  90 tablet 3 11/18/2022    [facility] [address]    Sig: Take 1 tablet (81 mg) by mouth daily    Class: E-Prescribe    Route: Oral    calcium carbonate-vitamin D (OSCAL) 500-5 MG-MCG tablet    12/1/2022        Sig: Take 1 tablet by mouth 2 times daily    Class: Historical    Route: Oral    capsaicin 0.035 % CREA            Sig: Externally apply topically 3 times daily    Class: Historical    Route: Apply externally    Continuous Blood Gluc  (DEXCOM G6  ) LUNA    6/1/2021        Sig: Use as directed for continuous glucose monitoring.    Class: Historical    Continuous Blood Gluc Sensor (DEXCOM G6 SENSOR) MISC    6/1/2021        Sig: Use as directed for continuous glucose monitoring.  Change sensor every 10 days.    Class: Historical    Continuous Blood Gluc Transmit (DEXCOM G6 TRANSMITTER) MISC    6/1/2021        Sig: Use as directed for continuous glucose monitoring.  Change every 3 months.    Class: Historical    cyanocobalamin (VITAMIN B-12) 1000 MCG tablet    9/2/2020    [facility] [id number] [place] [address]    Sig: Take 1,000 mcg by mouth every 7 days Take 1 tablet by mouth every 7 days on Wednesdays    Class: Historical    Route: Oral    cyclobenzaprine (FLEXERIL) 10 MG tablet  30 tablet 0 12/6/2022    [facility] [place], [place] - [address]    Sig: Take 1 tablet (10 mg) by mouth every 8 hours as needed for muscle spasms    Class: E-Prescribe    Route: Oral    Renewals     Renewal requests to authorizing provider (Bhavana Romo NP) <b>prohibited</b>          dapsone (ACZONE) 25 MG tablet  180 tablet 1 12/13/2022    [facility] [address]     Sig: Take 2 tablets (50 mg) by mouth daily    Class: E-Prescribe    Route: Oral    escitalopram (LEXAPRO) 20 MG tablet    11/28/2022        Sig: Take 20 mg by mouth daily    Class: Historical    Route: Oral    folic acid (FOLVITE) 1 MG tablet    2/26/2020    [facility] [id number] [place] [address]    Sig: Take 1 mg by mouth every morning    Class: Historical    Route: Oral    gabapentin (NEURONTIN) 100 MG capsule  90 capsule 2 12/13/2022    [facility]     Sig: Take 2 capsules (200 mg) by mouth 3 times daily    Class: E-Prescribe    Route: Oral    insulin aspart (NOVOLOG VIAL) 100 UNITS/ML vial    11/16/2022        Sig: Inject  1-10 Units Subcutaneous 4 times daily (with meals and nightly)    Class: No Print Out    Notes to Pharmacy: 0.5 unit(s) per 14g cho AC breakfast/lunch AND 0.5 unit(s) per 20 g cho for dinner AND NO meal insulin after 2000 hrs.  Snack coverage:   0800 - 1700 - 0.5 unit(s) per 14 g cho and 1701 - 2000 0.5 unit(s) per 20 g cho    Route: Subcutaneous    insulin glargine (LANTUS PEN) 100 UNIT/ML pen    12/6/2022        Sig: Inject 11 Units Subcutaneous every morning (before breakfast)    Class: No Print Out    Notes to Pharmacy: If Lantus is not covered by insurance, may substitute Basaglar or Semglee or other insulin glargine product per insurance preference at same dose and frequency.      Route: Subcutaneous    insulin pen needle (BD GRISEL U/F) 32G X 4 MM miscellaneous    6/1/2021        Sig: Inject 1 each Subcutaneous    Class: Historical    Route: Subcutaneous    levothyroxine (SYNTHROID/LEVOTHROID) 150 MCG tablet    5/23/2022        Sig: Take 1 tablet by mouth daily    Class: Historical    Route: Oral    LORazepam (ATIVAN) 0.5 MG tablet    11/28/2022        Sig: Take 0.5 mg by mouth nightly as needed For anxiety    Class: Historical    Route: Oral    magnesium oxide (MAG-OX) 400 MG tablet  270 tablet 3 12/13/2022    [facility] [place] [address] [address]    Sig: Take 1 tablet (400 mg) by mouth 3 times daily    Class: E-Prescribe    Route: Oral    melatonin 5 MG tablet            Sig: Take 5 mg by mouth At Bedtime    Class: Historical    Route: Oral    multivitamin CF FORMULA (DEKAS PLUS) capsule  90 capsule 3 3/1/2022    [id number] [place] [facility] [address] [facility]    Sig: Take 1 capsule by mouth daily    Class: E-Prescribe    Route: Oral    mycophenolate (GENERIC EQUIVALENT) 250 MG capsule  540 capsule 3 12/29/2022    [facility] [place] [address] [address]    Sig: Take 2 capsules (500 mg) by mouth two times daily    Class: E-Prescribe     Route: Oral    ondansetron (ZOFRAN ODT) 8 MG ODT tab  30 tablet 0 12/6/2022    [facility] - [place], [place] - [address]    Sig: Take 1 tablet (8 mg) by mouth every 8 hours as needed for nausea    Class: E-Prescribe    Route: Oral    polyethylene glycol (MIRALAX) 17 GM/Dose powder  510 g  11/10/2022        Sig: Take 17 g by mouth daily    Class: Transitional    Route: Oral    prochlorperazine (COMPAZINE) 10 MG tablet  10 tablet 0 12/15/2022    [id number] [place] [facility] [place], [place] - [address] [facility]    Sig: Take 0.5 tablets (5 mg) by mouth every 8 hours as needed for nausea or vomiting    Class: E-Prescribe    Route: Oral    rizatriptan (MAXALT) 10 MG tablet    11/28/2022        Sig: Take 10 mg by mouth as needed Take 1 Tablet (10 mg) by mouth as needed for Headache. at onset of migraine. May repeat in 2 hr if needed up to 30mg in 24 hr & MAX use 5 days/month. Do not use within 24 hours of an ergotamine preparation or a different triptan.    Class: Historical    Route: Oral    sitagliptin (JANUVIA) 100 MG tablet  30 tablet 0 11/17/2022    [facility] [place] [facility] - [place], [place] - [address]    Sig: Take 1 tablet (100 mg) by mouth daily    Class: E-Prescribe    Route: Oral    tacrolimus (GENERIC EQUIVALENT) 1 MG capsule  360 capsule 3 12/30/2022    [facility] - [place], [place] - [address]    Sig: Take 4 capsules (4 mg) by mouth every morning Total dose 4 mg AM, 5 mg PM    Class: E-Prescribe    Notes to Pharmacy: TXP DT 11/2/2022 (Liver) TXP Dischg DT 11/10/2022 DX Liver replaced by transplant Z94.4 TX [facility] ([place], [place])    Route: Oral    tacrolimus (GENERIC EQUIVALENT) 5 MG capsule  90 capsule 3 12/30/2022    [facility] - [place], [place] - [address]    Sig: Take 1 capsule (5 mg) by mouth every evening Total dose 4 mg AM, 5 mg PM    Class: E-Prescribe     Notes to Pharmacy: TXP DT 11/2/2022 (Liver) TXP Dischg DT 11/10/2022 DX Liver replaced by transplant Z94.4 TX Center [facility] ([place], [place])    Route: Oral    topiramate (TOPAMAX) 25 MG tablet  90 tablet 2 12/6/2022    [facility] - [place], [place] - [address]    Sig: Take 3 tablets (75 mg) by mouth At Bedtime    Class: E-Prescribe    Route: Oral    Renewals     Renewal requests to authorizing provider (Bhavana Romo NP) <b>prohibited</b>          traZODone (DESYREL) 50 MG tablet    11/28/2022        Sig: Take 50 mg by mouth At Bedtime    Class: Historical    Route: Oral    valGANciclovir (VALCYTE) 450 MG tablet  90 tablet 3 12/13/2022    [facility] - [place], [place] - [address]    Sig: Take 1 tablet (450 mg) by mouth daily    Class: E-Prescribe    Route: Oral          O:   [unfilled]    Transplant Immunosuppression Labs Latest Ref Rng & Units 12/29/2022 12/26/2022 12/22/2022 12/19/2022 12/15/2022   Creat 0.52 - 1.04 mg/dL 1.24(H) 1.15(H) 1.01 1.11(H) 1.12(H)   BUN 7 - 30 mg/dL 13 19 12 12 14.8   WBC 4.0 - 11.0 10e3/uL 2.5(L) 3.2(L) 2.6(L) 3.1(L) 2.5(L)   Neutrophil % - - - - 71   ANEU 1.6 - 8.3 10e9/L - - - - -       Chemistries:   Recent Labs   Lab Test 12/29/22  0827   BUN 13   CR 1.24*   GFRESTIMATED 53*   *     Lab Results   Component Value Date    A1C 5.7 11/02/2022     Recent Labs   Lab Test 12/29/22 0827   ALBUMIN 3.0*   BILITOTAL 0.4   ALKPHOS 78   AST 14   ALT 15     Urine Studies:  Recent Labs   Lab Test 11/19/22  0937   COLOR Yellow   APPEARANCE Slightly Cloudy*   URINEGLC Negative   URINEBILI Negative   URINEKETONE Negative   SG 1.017   UBLD Negative   URINEPH 5.5   PROTEIN 20*   NITRITE Positive*   LEUKEST Large*   RBCU 3*   WBCU >182*     No lab results found.  Hematology:   Recent Labs   Lab Test 12/29/22  0827 12/26/22  0838 12/22/22  0839   HGB 9.3* 8.5* 8.8*   * 131*  149*   WBC 2.5* 3.2* 2.6*     Coags:   Recent Labs   Lab Test 11/04/22  0607 11/03/22  0850   INR 1.32* 1.33*     HLA antibodies:   No results found for: FZ8VGGCBZ, BF6MZPFZLX, SR6QSMXWD, VJ2CMVOFJO    Assessment: Susan Puckett is doing fairly well s/p Liver Tx:  Issues we addressed during her visit include:    Plan:    1. Graft function: LFTs normalized  2. Immunosuppression Management: No change tac 8-12  .  Complexity of management:Low.  Contributing factors: still having pain/fatigue  3.HTN- pt wants to wait to start anti-NTH, will continue to monitor at home and bring log next visit, if not improved then will start low dose amlodipine  4.DM- having some low sugars in the AM, has reduced her long acting, has follow-up with her endocrinologist  Followup: 2 weeks            Delbert Morgan MD/PhD

## 2022-12-29 NOTE — NURSING NOTE
Chief Complaint   Patient presents with     RECHECK     Return visit.     Blood pressure 128/86, pulse 64, temperature 98.1  F (36.7  C), weight 75.5 kg (166 lb 6.4 oz), SpO2 100 %, not currently breastfeeding.    FREEDOM PERKINS

## 2022-12-30 ENCOUNTER — INFUSION THERAPY VISIT (OUTPATIENT)
Dept: INFUSION THERAPY | Facility: CLINIC | Age: 52
End: 2022-12-30
Attending: TRANSPLANT SURGERY
Payer: COMMERCIAL

## 2022-12-30 ENCOUNTER — TELEPHONE (OUTPATIENT)
Dept: TRANSPLANT | Facility: CLINIC | Age: 52
End: 2022-12-30

## 2022-12-30 VITALS
DIASTOLIC BLOOD PRESSURE: 75 MMHG | RESPIRATION RATE: 16 BRPM | SYSTOLIC BLOOD PRESSURE: 142 MMHG | TEMPERATURE: 98 F | OXYGEN SATURATION: 98 % | HEART RATE: 71 BPM

## 2022-12-30 DIAGNOSIS — E83.42 HYPOMAGNESEMIA: Primary | ICD-10-CM

## 2022-12-30 DIAGNOSIS — Z94.4 LIVER REPLACED BY TRANSPLANT (H): ICD-10-CM

## 2022-12-30 DIAGNOSIS — Z94.4 LIVER TRANSPLANT RECIPIENT (H): ICD-10-CM

## 2022-12-30 PROCEDURE — 96365 THER/PROPH/DIAG IV INF INIT: CPT

## 2022-12-30 PROCEDURE — 250N000011 HC RX IP 250 OP 636: Performed by: TRANSPLANT SURGERY

## 2022-12-30 PROCEDURE — 258N000003 HC RX IP 258 OP 636: Performed by: TRANSPLANT SURGERY

## 2022-12-30 PROCEDURE — 96366 THER/PROPH/DIAG IV INF ADDON: CPT

## 2022-12-30 PROCEDURE — 999N000248 HC STATISTIC IV INSERT WITH US BY RN

## 2022-12-30 RX ORDER — METHYLPREDNISOLONE SODIUM SUCCINATE 125 MG/2ML
125 INJECTION, POWDER, LYOPHILIZED, FOR SOLUTION INTRAMUSCULAR; INTRAVENOUS
Status: CANCELLED
Start: 2022-12-30

## 2022-12-30 RX ORDER — TACROLIMUS 5 MG/1
5 CAPSULE ORAL EVERY EVENING
Qty: 90 CAPSULE | Refills: 3 | Status: SHIPPED | OUTPATIENT
Start: 2022-12-30 | End: 2023-01-06

## 2022-12-30 RX ORDER — MAGNESIUM SULFATE HEPTAHYDRATE 40 MG/ML
4 INJECTION, SOLUTION INTRAVENOUS ONCE
Status: COMPLETED | OUTPATIENT
Start: 2022-12-30 | End: 2022-12-30

## 2022-12-30 RX ORDER — EPINEPHRINE 1 MG/ML
0.3 INJECTION, SOLUTION INTRAMUSCULAR; SUBCUTANEOUS EVERY 5 MIN PRN
Status: CANCELLED | OUTPATIENT
Start: 2022-12-30

## 2022-12-30 RX ORDER — ALBUTEROL SULFATE 0.83 MG/ML
2.5 SOLUTION RESPIRATORY (INHALATION)
Status: CANCELLED | OUTPATIENT
Start: 2022-12-30

## 2022-12-30 RX ORDER — HEPARIN SODIUM,PORCINE 10 UNIT/ML
5 VIAL (ML) INTRAVENOUS
Status: CANCELLED | OUTPATIENT
Start: 2022-12-30

## 2022-12-30 RX ORDER — TACROLIMUS 1 MG/1
4 CAPSULE ORAL EVERY MORNING
Qty: 360 CAPSULE | Refills: 3 | Status: SHIPPED | OUTPATIENT
Start: 2022-12-30 | End: 2023-01-06

## 2022-12-30 RX ORDER — MEPERIDINE HYDROCHLORIDE 25 MG/ML
25 INJECTION INTRAMUSCULAR; INTRAVENOUS; SUBCUTANEOUS EVERY 30 MIN PRN
Status: CANCELLED | OUTPATIENT
Start: 2022-12-30

## 2022-12-30 RX ORDER — MAGNESIUM SULFATE HEPTAHYDRATE 40 MG/ML
4 INJECTION, SOLUTION INTRAVENOUS ONCE
Status: CANCELLED
Start: 2022-12-30 | End: 2022-12-30

## 2022-12-30 RX ORDER — DIPHENHYDRAMINE HYDROCHLORIDE 50 MG/ML
50 INJECTION INTRAMUSCULAR; INTRAVENOUS
Status: CANCELLED
Start: 2022-12-30

## 2022-12-30 RX ORDER — ALBUTEROL SULFATE 90 UG/1
1-2 AEROSOL, METERED RESPIRATORY (INHALATION)
Status: CANCELLED
Start: 2022-12-30

## 2022-12-30 RX ORDER — HEPARIN SODIUM (PORCINE) LOCK FLUSH IV SOLN 100 UNIT/ML 100 UNIT/ML
5 SOLUTION INTRAVENOUS
Status: CANCELLED | OUTPATIENT
Start: 2022-12-30

## 2022-12-30 RX ADMIN — MAGNESIUM SULFATE HEPTAHYDRATE 4 G: 40 INJECTION, SOLUTION INTRAVENOUS at 12:14

## 2022-12-30 RX ADMIN — SODIUM CHLORIDE 250 ML: 9 INJECTION, SOLUTION INTRAVENOUS at 12:34

## 2022-12-30 ASSESSMENT — PAIN SCALES - GENERAL: PAINLEVEL: EXTREME PAIN (8)

## 2022-12-30 NOTE — LETTER
Date:January 2, 2023      Provider requested that no letter be sent. Do not send.       [facility]

## 2022-12-30 NOTE — PROGRESS NOTES
Transplant Surgery Progress Note    Transplants:  11/2/2022 (Liver); Postoperative day:  57  S: overall improving, waiting for appt to see neurology re headaches which aren't as bad lately    Transplant History:    Transplant Type:  Liver Tx  Donor Type:    Transplant Date:  11/2/2022 (Liver)   Biliary Stent:  No    Acute Rejection Hx:  No    Present Maintenance Immunosuppression:  Tacrolimus and Mycophenolate mofetil    CMV IgG Ab Discordance:  No  EBV IgG Ab Discordance:  No      Transplant Coordinator: Edwina Fallon     Transplant Office Phone Number: [phone]     Immunosuppressant Medications     Immunosuppressive Agents Disp Start End     mycophenolate (GENERIC EQUIVALENT) 250 MG capsule    540 capsule 12/29/2022     Sig - Route: Take 2 capsules (500 mg) by mouth two times daily - Oral    Class: E-Prescribe     tacrolimus (GENERIC EQUIVALENT) 1 MG capsule    360 capsule 12/30/2022     Sig - Route: Take 4 capsules (4 mg) by mouth every morning Total dose 4 mg AM, 5 mg PM - Oral    Class: E-Prescribe    Notes to Pharmacy: TXP DT 11/2/2022 (Liver) TXP Dischg DT 11/10/2022 DX Liver replaced by transplant Z94.4 [facility] ([place], [place])     tacrolimus (GENERIC EQUIVALENT) 5 MG capsule    90 capsule 12/30/2022     Sig - Route: Take 1 capsule (5 mg) by mouth every evening Total dose 4 mg AM, 5 mg PM - Oral    Class: E-Prescribe    Notes to Pharmacy: TXP DT 11/2/2022 (Liver) TXP Dischg DT 11/10/2022 DX Liver replaced by transplant Z94.4 TX [facility] ([place], [place])          Possible Immunosuppression-related side effects:   []             headache  []             vivid dreams  []             irritability  []             cognitive difficuties  []             fine tremor  []             nausea  []             diarrhea  []             neuropathy      []             edema  []             renal calcineurin toxicity  []              hyperkalemia  []             post-transplant diabetes  []             decreased appetite  []             increased appetite  []             other:  []             none    Prescription Medications as of 12/30/2022       Rx Number Disp Refills Start End Last Dispensed Date Next Fill Date Owning Pharmacy    acetaminophen (TYLENOL) 325 MG tablet  100 tablet 0 12/12/2022    [facility] [place] [address] [address]    Sig: Take 3 tablets (975 mg) by mouth every 8 hours as needed for mild pain    Class: E-Prescribe    Route: Oral    amylase-lipase-protease (CREON 24) 22753-64330 units CPEP per EC capsule  180 capsule 3 11/18/2022    [facility] [place] [address] [address]    Sig: Take 2 capsules by mouth 3 times daily (with meals)    Class: E-Prescribe    Route: Oral    amylase-lipase-protease (CREON 24) 18467-91425 units CPEP per EC capsule  90 capsule 3 11/18/2022    [facility] [place] [address] [address]    Sig: Take 1 capsule by mouth Take with snacks or supplements (with snacks)    Class: E-Prescribe    Route: Oral    aspirin (ASA) 81 MG chewable tablet  90 tablet 3 11/18/2022    [facility] [address] [address]    Sig: Take 1 tablet (81 mg) by mouth daily    Class: E-Prescribe    Route: Oral    calcium carbonate-vitamin D (OSCAL) 500-5 MG-MCG tablet    12/1/2022        Sig: Take 1 tablet by mouth 2 times daily    Class: Historical    Route: Oral    capsaicin 0.035 % CREA            Sig: Externally apply topically 3 times daily    Class: Historical    Route: Apply externally    Continuous Blood Gluc  (DEXCOM G6 ) LUNA    6/1/2021        Sig: Use as directed for continuous glucose monitoring.    Class: Historical    Continuous Blood Gluc Sensor (DEXCOM G6 SENSOR) MISC    6/1/2021        Sig: Use as directed for continuous glucose monitoring.  Change sensor every  10 days.    Class: Historical    Continuous Blood Gluc Transmit (DEXCOM G6 TRANSMITTER) MISC    6/1/2021        Sig: Use as directed for continuous glucose monitoring.  Change every 3 months.    Class: Historical    cyanocobalamin (VITAMIN B-12) 1000 MCG tablet    9/2/2020    [facility] [place] [address] [place] [address]    Sig: Take 1,000 mcg by mouth every 7 days Take 1 tablet by mouth every 7 days on Wednesdays    Class: Historical    Route: Oral    cyclobenzaprine (FLEXERIL) 10 MG tablet  30 tablet 0 12/6/2022    [facility] [place], [place] - [address]    Sig: Take 1 tablet (10 mg) by mouth every 8 hours as needed for muscle spasms    Class: E-Prescribe    Route: Oral    Renewals     Renewal requests to authorizing provider (Bhavana Romo, NP) <b>prohibited</b>          dapsone (ACZONE) 25 MG tablet  180 tablet 1 12/13/2022    [facility] [address]    Sig: Take 2 tablets (50 mg) by mouth daily    Class: E-Prescribe    Route: Oral    escitalopram (LEXAPRO) 20 MG tablet    11/28/2022        Sig: Take 20 mg by mouth daily    Class: Historical    Route: Oral    folic acid (FOLVITE) 1 MG tablet    2/26/2020    [facility] [place] [address] [place] [address]    Sig: Take 1 mg by mouth every morning    Class: Historical    Route: Oral    gabapentin (NEURONTIN) 100 MG capsule  90 capsule 2 12/13/2022    [facility] [address]    Sig: Take 2 capsules (200 mg) by mouth 3 times daily    Class: E-Prescribe    Route: Oral    insulin aspart (NOVOLOG VIAL) 100 UNITS/ML vial    11/16/2022        Sig: Inject 1-10 Units Subcutaneous 4 times daily (with meals and nightly)    Class: No Print Out    Notes to Pharmacy: 0.5 unit(s) per 14g cho AC breakfast/lunch AND 0.5 unit(s) per 20 g cho for dinner AND NO meal insulin after 2000 hrs.  Snack coverage:   0800 - 1700 - 0.5  unit(s) per 14 g cho and 1701 - 2000 0.5 unit(s) per 20 g cho    Route: Subcutaneous    insulin glargine (LANTUS PEN) 100 UNIT/ML pen    12/6/2022        Sig: Inject 11 Units Subcutaneous every morning (before breakfast)    Class: No Print Out    Notes to Pharmacy: If Lantus is not covered by insurance, may substitute Basaglar or Semglee or other insulin glargine product per insurance preference at same dose and frequency.      Route: Subcutaneous    insulin pen needle (BD GRISEL U/F) 32G X 4 MM miscellaneous    6/1/2021        Sig: Inject 1 each Subcutaneous    Class: Historical    Route: Subcutaneous    levothyroxine (SYNTHROID/LEVOTHROID) 150 MCG tablet    5/23/2022        Sig: Take 1 tablet by mouth daily    Class: Historical    Route: Oral    LORazepam (ATIVAN) 0.5 MG tablet    11/28/2022        Sig: Take 0.5 mg by mouth nightly as needed For anxiety    Class: Historical    Route: Oral    magnesium oxide (MAG-OX) 400 MG tablet  270 tablet 3 12/13/2022    [facility]    Sig: Take 1 tablet (400 mg) by mouth 3 times daily    Class: E-Prescribe    Route: Oral    melatonin 5 MG tablet            Sig: Take 5 mg by mouth At Bedtime    Class: Historical    Route: Oral    multivitamin CF FORMULA (DEKAS PLUS) capsule  90 capsule 3 3/1/2022    [facility] [address] [place] [facility] [address] [facility]    Sig: Take 1 capsule by mouth daily    Class: E-Prescribe    Route: Oral    mycophenolate (GENERIC EQUIVALENT) 250 MG capsule  540 capsule 3 12/29/2022    [facility]    Sig: Take 2 capsules (500 mg) by mouth two times daily    Class: E-Prescribe    Route: Oral    ondansetron (ZOFRAN ODT) 8 MG ODT tab  30 tablet 0 12/6/2022    [facility] [place], [place] - [address] [place] [address] [address]    Sig: Take 1 tablet (8 mg) by mouth every 8 hours as needed for nausea    Class: E-Prescribe    Route: Oral     polyethylene glycol (MIRALAX) 17 GM/Dose powder  510 g  11/10/2022        Sig: Take 17 g by mouth daily    Class: Transitional    Route: Oral    prochlorperazine (COMPAZINE) 10 MG tablet  10 tablet 0 12/15/2022    [facility] [address] [place] [facility] [place] - [address] [facility]    Sig: Take 0.5 tablets (5 mg) by mouth every 8 hours as needed for nausea or vomiting    Class: E-Prescribe    Route: Oral    rizatriptan (MAXALT) 10 MG tablet    11/28/2022        Sig: Take 10 mg by mouth as needed Take 1 Tablet (10 mg) by mouth as needed for Headache. at onset of migraine. May repeat in 2 hr if needed up to 30mg in 24 hr & MAX use 5 days/month. Do not use within 24 hours of an ergotamine preparation or a different triptan.    Class: Historical    Route: Oral    sitagliptin (JANUVIA) 100 MG tablet  30 tablet 0 11/17/2022    [facility] [place], [place] - [address]    Sig: Take 1 tablet (100 mg) by mouth daily    Class: E-Prescribe    Route: Oral    tacrolimus (GENERIC EQUIVALENT) 1 MG capsule  360 capsule 3 12/30/2022    [facility] [place], [place] - [address]    Sig: Take 4 capsules (4 mg) by mouth every morning Total dose 4 mg AM, 5 mg PM    Class: E-Prescribe    Notes to Pharmacy: TXP DT 11/2/2022 (Liver) TXP Dischg DT 11/10/2022 DX Liver replaced by transplant Z94.4 [facility] ([place], [place])    Route: Oral    tacrolimus (GENERIC EQUIVALENT) 5 MG capsule  90 capsule 3 12/30/2022    [facility] [place] - [address]    Sig: Take 1 capsule (5 mg) by mouth every evening Total dose 4 mg AM, 5 mg PM    Class: E-Prescribe    Notes to Pharmacy: TXP DT 11/2/2022 (Liver) TXP Dischg DT 11/10/2022 DX Liver replaced by transplant Z94.4 TX [facility] ([place], [place])    Route: Oral    topiramate (TOPAMAX) 25 MG tablet  90 tablet 2 12/6/2022     [facility] [place] [place] - [address] [facility]    Sig: Take 3 tablets (75 mg) by mouth At Bedtime    Class: E-Prescribe    Route: Oral    Renewals     Renewal requests to authorizing provider (Bhavana Romo NP) <b>prohibited</b>          traZODone (DESYREL) 50 MG tablet    11/28/2022        Sig: Take 50 mg by mouth At Bedtime    Class: Historical    Route: Oral    valGANciclovir (VALCYTE) 450 MG tablet  90 tablet 3 12/13/2022    [facility] [place] [place] - [address] [facility]    Sig: Take 1 tablet (450 mg) by mouth daily    Class: E-Prescribe    Route: Oral          O:   [unfilled]    Transplant Immunosuppression Labs Latest Ref Rng & Units 12/29/2022 12/26/2022 12/22/2022 12/19/2022 12/15/2022   Creat 0.52 - 1.04 mg/dL 1.24(H) 1.15(H) 1.01 1.11(H) 1.12(H)   BUN 7 - 30 mg/dL 13 19 12 12 14.8   WBC 4.0 - 11.0 10e3/uL 2.5(L) 3.2(L) 2.6(L) 3.1(L) 2.5(L)   Neutrophil % - - - - 71   ANEU 1.6 - 8.3 10e9/L - - - - -       Chemistries:   Recent Labs   Lab Test 12/29/22  0827   BUN 13   CR 1.24*   GFRESTIMATED 53*   *     Lab Results   Component Value Date    A1C 5.7 11/02/2022     Recent Labs   Lab Test 12/29/22 0827   ALBUMIN 3.0*   BILITOTAL 0.4   ALKPHOS 78   AST 14   ALT 15     Urine Studies:  Recent Labs   Lab Test 11/19/22  0937   COLOR Yellow   APPEARANCE Slightly Cloudy*   URINEGLC Negative   URINEBILI Negative   URINEKETONE Negative   SG 1.017   UBLD Negative   URINEPH 5.5   PROTEIN 20*   NITRITE Positive*   LEUKEST Large*   RBCU 3*   WBCU >182*     No lab results found.  Hematology:   Recent Labs   Lab Test 12/29/22  0827 12/26/22  0838 12/22/22  0839   HGB 9.3* 8.5* 8.8*   * 131* 149*   WBC 2.5* 3.2* 2.6*     Coags:   Recent Labs   Lab Test 11/04/22  0607 11/03/22  0850   INR 1.32* 1.33*     HLA antibodies:   No results found for: MI9EYDPOW, AP0EYXNMTA, [id number], [id number]    Assessment: Susan Puckett is doing fairly well s/p Liver Tx:   Issues we addressed during her visit include:    Plan:    1. Graft function: LFTs normalized  2. Immunosuppression Management: No change tac 8-12  .  Complexity of management:Low.  Contributing factors: still having pain/fatigue  3.HTN- pt wants to wait to start anti-NTH, will continue to monitor at home and bring log next visit, if not improved then will start low dose amlodipine  4.DM- having some low sugars in the AM, has reduced her long acting, has follow-up with her endocrinologist  Followup: 2 weeks            Delbert Morgan MD/PhD

## 2022-12-30 NOTE — LETTER
12/30/2022         RE: Susan Puckett  [address] [address]        Dear Colleague,    Thank you for referring your patient, Susan Puckett, to the [facility]. Please see a copy of my visit note below.    Infusion Nursing Note:  Suasn Puckett presents today for magnesium.    Patient seen by provider today: No   present during visit today: Not Applicable.    Note: Patient complained of burning and discomfort throughout infusion. NS administered concurrently to dilute and hot pack applied to PIV site which made infusion tolerable for patient. Patient states she thinks she had magnesium infusion inpatient and experienced similar symptoms.      Intravenous Access:  [facility]    Treatment Conditions:  Not Applicable.    Post Infusion Assessment:  Patient tolerated infusion without incident.  Blood return noted pre and post infusion.  Site patent and intact, free from redness, edema or discomfort.  No evidence of extravasations.  Access discontinued per protocol.     Discharge Plan:   Discharge instructions reviewed with: Patient.  Patient and/or family verbalized understanding of discharge instructions and all questions answered.  Copy of AVS reviewed with patient and/or family.  Patient discharged in stable condition accompanied by: .  Departure Mode: Ambulatory.    Administrations This Visit     0.9% sodium chloride BOLUS     Admin Date  12/30/2022 Action  New Bag Dose  250 mL Route  Intravenous Administered By  Che Lewis RN          magnesium sulfate 4 g in 100 mL sterile water (premade)     Admin Date  12/30/2022 Action  New Bag Dose  4 g Rate  50 mL/hr Route  Intravenous Administered By  Che Lewis, AJAY Lewis RN                        Again, thank you for allowing me to participate in the care of your patient.        Sincerely,        Specialty Infusion Nurse

## 2022-12-30 NOTE — PROGRESS NOTES
Infusion Nursing Note:  Susan Puckett presents today for magnesium.    Patient seen by provider today: No   present during visit today: Not Applicable.    Note: Patient complained of burning and discomfort throughout infusion. NS administered concurrently to dilute and hot pack applied to PIV site which made infusion tolerable for patient. Patient states she thinks she had magnesium infusion inpatient and experienced similar symptoms.      Intravenous Access:  [facility]    Treatment Conditions:  Not Applicable.    Post Infusion Assessment:  Patient tolerated infusion without incident.  Blood return noted pre and post infusion.  Site patent and intact, free from redness, edema or discomfort.  No evidence of extravasations.  Access discontinued per protocol.     Discharge Plan:   Discharge instructions reviewed with: Patient.  Patient and/or family verbalized understanding of discharge instructions and all questions answered.  Copy of AVS reviewed with patient and/or family.  Patient discharged in stable condition accompanied by: .  Departure Mode: Ambulatory.    Administrations This Visit     0.9% sodium chloride BOLUS     Admin Date  12/30/2022 Action  New Bag Dose  250 mL Route  Intravenous Administered By  Che Lewis RN          magnesium sulfate 4 g in 100 mL sterile water (premade)     Admin Date  12/30/2022 Action  New Bag Dose  4 g Rate  50 mL/hr Route  Intravenous Administered By  Che Lewis RN Julie Backhaus, RN

## 2022-12-30 NOTE — PATIENT INSTRUCTIONS
Dear Susan Puckett    Thank you for choosing [facility] ([facility]) for your infusion.  The following information is a summary of our appointment as well as important reminders.      We look forward in seeing you on your next appointment here at [facility] ([facility]).  Please don t hesitate to call us at [phone] to reschedule any of your appointments or to speak with one of the [facility] registered nurses.  It was a pleasure taking care of you today.    Sincerely,    [facility]  [address] [place] [address]  [id number]  Phone:  [phone]

## 2022-12-30 NOTE — TELEPHONE ENCOUNTER
Spoke to Susan. She reports feeling achy today and having generalized abdominal pain. Reports she got an abdominal x-ray through her PCP which showed stool burden. Currently taking Miralax. Recommended patient start taking OTC Senna-S or Ducosate. Also recommended Susan call her PCP to see if she would have any further recommendations.    States she is calling physical therapy today to book appts.     States she continues to have intermittent headaches. Will be seeing neurologist through PN in Feb 2023.     Reviewed labs from yesterday. Liver tests WNL. Creat up at 1.24, patient reports she has been staying hydrated. Tac level 9.6, goal 8-10. Currently at 5 mg BID. Decreased to 4 mg AM, 5 mg PM, repeat labs Monday.    Per Dr Mogran, requesting appt in 2 weeks. SOT appt request placed.

## 2022-12-31 ENCOUNTER — TELEPHONE (OUTPATIENT)
Dept: TRANSPLANT | Facility: CLINIC | Age: 52
End: 2022-12-31

## 2023-01-01 NOTE — TELEPHONE ENCOUNTER
Pt paged that she went to urgent care for back pain, shoulder pain and neck pain. The pain radiates down arm and into chest.    They did xrays and gave tramadol and referral for PT.    Pt wanted to be sure tramadol is ok for her to take as ordered as needed for pain.     Susan encouraged to call to make PT appt on Monday.

## 2023-01-02 ENCOUNTER — TELEPHONE (OUTPATIENT)
Dept: TRANSPLANT | Facility: CLINIC | Age: 53
End: 2023-01-02

## 2023-01-02 ENCOUNTER — HOSPITAL ENCOUNTER (OUTPATIENT)
Dept: OCCUPATIONAL THERAPY | Facility: CLINIC | Age: 53
Setting detail: THERAPIES SERIES
Discharge: HOME | End: 2023-01-02
Attending: INTERNAL MEDICINE
Payer: COMMERCIAL

## 2023-01-02 ENCOUNTER — LAB (OUTPATIENT)
Dept: LAB | Facility: CLINIC | Age: 53
End: 2023-01-02
Payer: COMMERCIAL

## 2023-01-02 DIAGNOSIS — Z94.4 LIVER REPLACED BY TRANSPLANT (H): ICD-10-CM

## 2023-01-02 DIAGNOSIS — I89.0 LYMPHEDEMA: Primary | ICD-10-CM

## 2023-01-02 LAB
ALBUMIN SERPL-MCNC: 2.8 G/DL (ref 3.4–5)
ALP SERPL-CCNC: 73 U/L (ref 40–150)
ALT SERPL W P-5'-P-CCNC: 13 U/L (ref 0–50)
ANION GAP SERPL CALCULATED.3IONS-SCNC: 2 MMOL/L (ref 3–14)
AST SERPL W P-5'-P-CCNC: 13 U/L (ref 0–45)
BASOPHILS # BLD MANUAL: 0 10E3/UL (ref 0–0.2)
BASOPHILS NFR BLD MANUAL: 1 %
BILIRUB DIRECT SERPL-MCNC: 0.1 MG/DL (ref 0–0.2)
BILIRUB SERPL-MCNC: 0.3 MG/DL (ref 0.2–1.3)
BUN SERPL-MCNC: 16 MG/DL (ref 7–30)
CALCIUM SERPL-MCNC: 8.1 MG/DL (ref 8.5–10.1)
CHLORIDE BLD-SCNC: 117 MMOL/L (ref 94–109)
CO2 SERPL-SCNC: 24 MMOL/L (ref 20–32)
CREAT SERPL-MCNC: 1.13 MG/DL (ref 0.52–1.04)
EOSINOPHIL # BLD MANUAL: 0 10E3/UL (ref 0–0.7)
EOSINOPHIL NFR BLD MANUAL: 0 %
ERYTHROCYTE [DISTWIDTH] IN BLOOD BY AUTOMATED COUNT: 14.3 % (ref 10–15)
GFR SERPL CREATININE-BSD FRML MDRD: 59 ML/MIN/1.73M2
GLUCOSE BLD-MCNC: 197 MG/DL (ref 70–99)
HCT VFR BLD AUTO: 30.3 % (ref 35–47)
HGB BLD-MCNC: 9.2 G/DL (ref 11.7–15.7)
LYMPHOCYTES # BLD MANUAL: 0.6 10E3/UL (ref 0.8–5.3)
LYMPHOCYTES NFR BLD MANUAL: 32 %
MAGNESIUM SERPL-MCNC: 1.8 MG/DL (ref 1.6–2.3)
MCH RBC QN AUTO: 30.3 PG (ref 26.5–33)
MCHC RBC AUTO-ENTMCNC: 30.4 G/DL (ref 31.5–36.5)
MCV RBC AUTO: 100 FL (ref 78–100)
MONOCYTES # BLD MANUAL: 0.1 10E3/UL (ref 0–1.3)
MONOCYTES NFR BLD MANUAL: 4 %
NEUTROPHILS # BLD MANUAL: 1.2 10E3/UL (ref 1.6–8.3)
NEUTROPHILS NFR BLD MANUAL: 63 %
PHOSPHATE SERPL-MCNC: 5 MG/DL (ref 2.5–4.5)
PLAT MORPH BLD: ABNORMAL
PLATELET # BLD AUTO: 138 10E3/UL (ref 150–450)
POTASSIUM BLD-SCNC: 5 MMOL/L (ref 3.4–5.3)
PROT SERPL-MCNC: 5.7 G/DL (ref 6.8–8.8)
RBC # BLD AUTO: 3.04 10E6/UL (ref 3.8–5.2)
RBC MORPH BLD: ABNORMAL
SODIUM SERPL-SCNC: 143 MMOL/L (ref 133–144)
TACROLIMUS BLD-MCNC: 8.2 UG/L (ref 5–15)
TME LAST DOSE: NORMAL H
TME LAST DOSE: NORMAL H
VARIANT LYMPHS BLD QL SMEAR: PRESENT
WBC # BLD AUTO: 1.9 10E3/UL (ref 4–11)

## 2023-01-02 PROCEDURE — 83735 ASSAY OF MAGNESIUM: CPT

## 2023-01-02 PROCEDURE — 85027 COMPLETE CBC AUTOMATED: CPT

## 2023-01-02 PROCEDURE — 84100 ASSAY OF PHOSPHORUS: CPT

## 2023-01-02 PROCEDURE — 80053 COMPREHEN METABOLIC PANEL: CPT

## 2023-01-02 PROCEDURE — 36415 COLL VENOUS BLD VENIPUNCTURE: CPT

## 2023-01-02 PROCEDURE — 82248 BILIRUBIN DIRECT: CPT

## 2023-01-02 PROCEDURE — 80197 ASSAY OF TACROLIMUS: CPT

## 2023-01-02 PROCEDURE — 85007 BL SMEAR W/DIFF WBC COUNT: CPT

## 2023-01-02 PROCEDURE — 97140 MANUAL THERAPY 1/> REGIONS: CPT | Mod: GO

## 2023-01-02 RX ORDER — VALGANCICLOVIR 450 MG/1
450 TABLET, FILM COATED ORAL DAILY
Qty: 90 TABLET | Refills: 3 | Status: ON HOLD | COMMUNITY
Start: 2023-01-02 | End: 2023-01-19

## 2023-01-02 NOTE — TELEPHONE ENCOUNTER
WBC 1.9, anc 1.2.  Low risk for CMV (D+, R-).  Called Susan, asked her to hold valcyte. No answer, LM

## 2023-01-04 ENCOUNTER — DOCUMENTATION ONLY (OUTPATIENT)
Dept: TRANSPLANT | Facility: CLINIC | Age: 53
End: 2023-01-04

## 2023-01-04 NOTE — PROGRESS NOTES
"Susan sent a MyC message stating some concerns she has been having:    She reports pain at incisional site, along with bilateral shoulder/back/chest pain.    \" I am having some severe upper back/left shoulder blade into left chest area pain - I ended up in urgent care for this and my stomach pain. I did go to my primary for my stomach and she took xrays and referreed me to ultrasound  -which was on 1/6 - urgent care did ultrasound. I have 2 spots = 1 is on the right side on the line of my scar. It is a severe sharp pain like someone is stabbing me. It hurts all the time and the other is on left side and the   same pain like someone is stabbing me -- such sharp pain.\"    Patient was seen at  urgent care on 12/31 for these issues. Ultrasound of transplanted liver completed, findings include:        Thoracic & shoulder x-ray negative for anything acute. Per urgent care provider note:         Messaged Susan back letting her know that unfortunately, incisional pain is very normal after surgery as the nerves & muscles are healing. As long as there is no indication for infection (redness, drainage, fevers), the scar will heal and the pain will eventually get better. Per UC providers note, patient has not been taking Miralax regularly (axr on 12/23 showing moderate stool burden). I have also recommended she  OTC Senna-S to help with constipation, when I spoke to her last week she hadn't picked that up yet. Encouraged patient to take Miralax daily to help with constipation. UC provider also mentioned that Susan had only been to PT once. I encouraged Susan to go weekly to help with movement/pain. UC provider gave 10 tabs Tramadol to help with pain.    Reviewed appt note from 12/29 with Dr Morgan, no concerns noted. Reviewed labs from 1/2, all WNL,Tac level 8.2 (goal 8-12). Message to Dr Morgan regarding patients pain, he recommends continuing to reassure patient. No interventions ordered at this time.    Next appt " scheduled for 1/13.

## 2023-01-05 ENCOUNTER — HOSPITAL ENCOUNTER (OUTPATIENT)
Dept: OCCUPATIONAL THERAPY | Facility: CLINIC | Age: 53
Setting detail: THERAPIES SERIES
Discharge: HOME | End: 2023-01-05
Attending: INTERNAL MEDICINE
Payer: COMMERCIAL

## 2023-01-05 ENCOUNTER — LAB (OUTPATIENT)
Dept: LAB | Facility: CLINIC | Age: 53
End: 2023-01-05
Payer: COMMERCIAL

## 2023-01-05 ENCOUNTER — TELEPHONE (OUTPATIENT)
Dept: TRANSPLANT | Facility: CLINIC | Age: 53
End: 2023-01-05

## 2023-01-05 DIAGNOSIS — D72.819 LEUKOPENIA: Primary | ICD-10-CM

## 2023-01-05 DIAGNOSIS — E83.42 HYPOMAGNESEMIA: Primary | ICD-10-CM

## 2023-01-05 DIAGNOSIS — Z94.4 LIVER REPLACED BY TRANSPLANT (H): ICD-10-CM

## 2023-01-05 DIAGNOSIS — D72.819 LEUKOPENIA: ICD-10-CM

## 2023-01-05 LAB
ALBUMIN SERPL-MCNC: 3.1 G/DL (ref 3.4–5)
ALP SERPL-CCNC: 75 U/L (ref 40–150)
ALT SERPL W P-5'-P-CCNC: 16 U/L (ref 0–50)
ANION GAP SERPL CALCULATED.3IONS-SCNC: 3 MMOL/L (ref 3–14)
AST SERPL W P-5'-P-CCNC: 21 U/L (ref 0–45)
BASOPHILS # BLD AUTO: 0 10E3/UL (ref 0–0.2)
BASOPHILS NFR BLD AUTO: 1 %
BILIRUB DIRECT SERPL-MCNC: 0.1 MG/DL (ref 0–0.2)
BILIRUB SERPL-MCNC: 0.4 MG/DL (ref 0.2–1.3)
BUN SERPL-MCNC: 16 MG/DL (ref 7–30)
CALCIUM SERPL-MCNC: 8.4 MG/DL (ref 8.5–10.1)
CHLORIDE BLD-SCNC: 115 MMOL/L (ref 94–109)
CO2 SERPL-SCNC: 24 MMOL/L (ref 20–32)
CREAT SERPL-MCNC: 1.01 MG/DL (ref 0.52–1.04)
EOSINOPHIL # BLD AUTO: 0.1 10E3/UL (ref 0–0.7)
EOSINOPHIL NFR BLD AUTO: 3 %
ERYTHROCYTE [DISTWIDTH] IN BLOOD BY AUTOMATED COUNT: 14.1 % (ref 10–15)
GFR SERPL CREATININE-BSD FRML MDRD: 67 ML/MIN/1.73M2
GLUCOSE BLD-MCNC: 104 MG/DL (ref 70–99)
HCT VFR BLD AUTO: 30.8 % (ref 35–47)
HGB BLD-MCNC: 9.5 G/DL (ref 11.7–15.7)
HOLD SPECIMEN: NORMAL
IMM GRANULOCYTES # BLD: 0.1 10E3/UL
IMM GRANULOCYTES NFR BLD: 2 %
LYMPHOCYTES # BLD AUTO: 0.7 10E3/UL (ref 0.8–5.3)
LYMPHOCYTES NFR BLD AUTO: 34 %
MAGNESIUM SERPL-MCNC: 1.5 MG/DL (ref 1.6–2.3)
MCH RBC QN AUTO: 30.3 PG (ref 26.5–33)
MCHC RBC AUTO-ENTMCNC: 30.8 G/DL (ref 31.5–36.5)
MCV RBC AUTO: 98 FL (ref 78–100)
MONOCYTES # BLD AUTO: 0.1 10E3/UL (ref 0–1.3)
MONOCYTES NFR BLD AUTO: 4 %
NEUTROPHILS # BLD AUTO: 1.1 10E3/UL (ref 1.6–8.3)
NEUTROPHILS NFR BLD AUTO: 56 %
PHOSPHATE SERPL-MCNC: 4.5 MG/DL (ref 2.5–4.5)
PLATELET # BLD AUTO: 160 10E3/UL (ref 150–450)
POTASSIUM BLD-SCNC: 4.4 MMOL/L (ref 3.4–5.3)
PROT SERPL-MCNC: 6.4 G/DL (ref 6.8–8.8)
RBC # BLD AUTO: 3.14 10E6/UL (ref 3.8–5.2)
SODIUM SERPL-SCNC: 142 MMOL/L (ref 133–144)
TACROLIMUS BLD-MCNC: 7.3 UG/L (ref 5–15)
TME LAST DOSE: NORMAL H
TME LAST DOSE: NORMAL H
WBC # BLD AUTO: 2.1 10E3/UL (ref 4–11)
WBC # BLD AUTO: 2.1 10E3/UL (ref 4–11)

## 2023-01-05 PROCEDURE — 84100 ASSAY OF PHOSPHORUS: CPT

## 2023-01-05 PROCEDURE — 83735 ASSAY OF MAGNESIUM: CPT

## 2023-01-05 PROCEDURE — 85025 COMPLETE CBC W/AUTO DIFF WBC: CPT

## 2023-01-05 PROCEDURE — 82248 BILIRUBIN DIRECT: CPT

## 2023-01-05 PROCEDURE — 97140 MANUAL THERAPY 1/> REGIONS: CPT | Mod: GO

## 2023-01-05 PROCEDURE — 80197 ASSAY OF TACROLIMUS: CPT

## 2023-01-05 PROCEDURE — 80053 COMPREHEN METABOLIC PANEL: CPT

## 2023-01-05 PROCEDURE — 36415 COLL VENOUS BLD VENIPUNCTURE: CPT

## 2023-01-05 NOTE — TELEPHONE ENCOUNTER
----- Message from Davi Clayton [facility] sent at 1/5/2023  1:41 PM CST -----  Regarding: RE: Magnesium  2 tablets 3 TIMES DAILY would be good.  ----- Message -----  From: Edwina Fallon RN  Sent: 1/5/2023   9:35 AM CST  To: Davi Clayton [facility]  Subject: Magnesium                                        Hi Susan Tomlinson just had an IV infusion of Mag, level went up to 1.8 and already back down to 1.5. She is on Mag Oxide 1 tab 3x daily. I know it's not super low, but thinking I should try Doctor's Best. What dose would you recommend for her?    Spoke to Susan. Instructed her to discontinue current Mag Oxide medication, start taking Doctor's Best Magnesium. Patient requesting all medication phone calls go to her , Roly. Placed note in chart stating that. MyC message sent to Susan with medication update as well.

## 2023-01-06 ENCOUNTER — TELEPHONE (OUTPATIENT)
Dept: TRANSPLANT | Facility: CLINIC | Age: 53
End: 2023-01-06

## 2023-01-06 DIAGNOSIS — Z94.4 LIVER REPLACED BY TRANSPLANT (H): ICD-10-CM

## 2023-01-06 DIAGNOSIS — E83.42 HYPOMAGNESEMIA: ICD-10-CM

## 2023-01-06 RX ORDER — TACROLIMUS 1 MG/1
1 CAPSULE ORAL PRN
Qty: 360 CAPSULE | Refills: 3 | Status: SHIPPED | OUTPATIENT
Start: 2023-01-06 | End: 2023-01-11

## 2023-01-06 RX ORDER — TACROLIMUS 5 MG/1
5 CAPSULE ORAL 2 TIMES DAILY
Qty: 90 CAPSULE | Refills: 3 | Status: SHIPPED | OUTPATIENT
Start: 2023-01-06 | End: 2023-01-10

## 2023-01-06 NOTE — TELEPHONE ENCOUNTER
Roly called regarding if patient can take Hydrocodone-acetaminophen tablets prescribed by her PCP for upper back/shoulder pain. Told Rivera that she can take but cannot exceed 2g in 24 hour period.

## 2023-01-06 NOTE — TELEPHONE ENCOUNTER
ISSUE:   Tacrolimus IR level 7.3 on 1/5, goal 8-12, dose 4 mg AM, 5 mg PM.    PLAN:   Please call patient and confirm this was an accurate 12-hour trough. Verify Tacrolimus IR dose 4 mg AM, 5 mg PM. Confirm no new medications or illness. Confirm no missed doses. If accurate trough and accurate dose, increase Tacrolimus IR dose to 5 mg daily and repeat labs on Monday.    OUTCOME:   Spoke with patient, they confirm accurate trough level and current dose 4 mg AM, 5 mg PM. Patient confirmed dose change to 5 mg BID and to repeat labs on Monday. Orders sent to preferred pharmacy for dose change and lab for repeat labs. Patient voiced understanding of plan.     In addition, patient's local pharm did not have Doctor's Best Mag, [facility]. Instructed patient to take Mag Oxide until she gets new medication.      Edwina Fallon RN

## 2023-01-09 ENCOUNTER — HOSPITAL ENCOUNTER (OUTPATIENT)
Dept: OCCUPATIONAL THERAPY | Facility: CLINIC | Age: 53
Setting detail: THERAPIES SERIES
Discharge: HOME | End: 2023-01-09
Attending: INTERNAL MEDICINE
Payer: COMMERCIAL

## 2023-01-09 ENCOUNTER — TELEPHONE (OUTPATIENT)
Dept: TRANSPLANT | Facility: CLINIC | Age: 53
End: 2023-01-09

## 2023-01-09 ENCOUNTER — LAB (OUTPATIENT)
Dept: LAB | Facility: CLINIC | Age: 53
End: 2023-01-09
Payer: COMMERCIAL

## 2023-01-09 DIAGNOSIS — Z94.4 LIVER REPLACED BY TRANSPLANT (H): ICD-10-CM

## 2023-01-09 LAB
ALBUMIN SERPL-MCNC: 3 G/DL (ref 3.4–5)
ALP SERPL-CCNC: 73 U/L (ref 40–150)
ALT SERPL W P-5'-P-CCNC: 16 U/L (ref 0–50)
ANION GAP SERPL CALCULATED.3IONS-SCNC: 6 MMOL/L (ref 3–14)
AST SERPL W P-5'-P-CCNC: 17 U/L (ref 0–45)
BILIRUB DIRECT SERPL-MCNC: 0.1 MG/DL (ref 0–0.2)
BILIRUB SERPL-MCNC: 0.4 MG/DL (ref 0.2–1.3)
BUN SERPL-MCNC: 14 MG/DL (ref 7–30)
CALCIUM SERPL-MCNC: 8.4 MG/DL (ref 8.5–10.1)
CHLORIDE BLD-SCNC: 116 MMOL/L (ref 94–109)
CO2 SERPL-SCNC: 22 MMOL/L (ref 20–32)
CREAT SERPL-MCNC: 1.09 MG/DL (ref 0.52–1.04)
ERYTHROCYTE [DISTWIDTH] IN BLOOD BY AUTOMATED COUNT: 13.7 % (ref 10–15)
GFR SERPL CREATININE-BSD FRML MDRD: 62 ML/MIN/1.73M2
GLUCOSE BLD-MCNC: 204 MG/DL (ref 70–99)
HCT VFR BLD AUTO: 31.7 % (ref 35–47)
HGB BLD-MCNC: 9.5 G/DL (ref 11.7–15.7)
MAGNESIUM SERPL-MCNC: 1.4 MG/DL (ref 1.6–2.3)
MCH RBC QN AUTO: 29.2 PG (ref 26.5–33)
MCHC RBC AUTO-ENTMCNC: 30 G/DL (ref 31.5–36.5)
MCV RBC AUTO: 98 FL (ref 78–100)
PHOSPHATE SERPL-MCNC: 4.2 MG/DL (ref 2.5–4.5)
PLATELET # BLD AUTO: 156 10E3/UL (ref 150–450)
POTASSIUM BLD-SCNC: 4.5 MMOL/L (ref 3.4–5.3)
PROT SERPL-MCNC: 5.9 G/DL (ref 6.8–8.8)
RBC # BLD AUTO: 3.25 10E6/UL (ref 3.8–5.2)
SODIUM SERPL-SCNC: 144 MMOL/L (ref 133–144)
TACROLIMUS BLD-MCNC: 7.8 UG/L (ref 5–15)
TME LAST DOSE: NORMAL H
TME LAST DOSE: NORMAL H
WBC # BLD AUTO: 2.3 10E3/UL (ref 4–11)

## 2023-01-09 PROCEDURE — 36415 COLL VENOUS BLD VENIPUNCTURE: CPT

## 2023-01-09 PROCEDURE — 82248 BILIRUBIN DIRECT: CPT

## 2023-01-09 PROCEDURE — 80197 ASSAY OF TACROLIMUS: CPT

## 2023-01-09 PROCEDURE — 85027 COMPLETE CBC AUTOMATED: CPT

## 2023-01-09 PROCEDURE — 83735 ASSAY OF MAGNESIUM: CPT

## 2023-01-09 PROCEDURE — 97140 MANUAL THERAPY 1/> REGIONS: CPT | Mod: GO

## 2023-01-09 PROCEDURE — 84100 ASSAY OF PHOSPHORUS: CPT

## 2023-01-09 PROCEDURE — 80053 COMPREHEN METABOLIC PANEL: CPT

## 2023-01-09 NOTE — TELEPHONE ENCOUNTER
Susan called requesting Friday's appt with Sybil be switched to virtual. Per Sybil, OK to switch to virtual.

## 2023-01-09 NOTE — PROGRESS NOTES
"Pt evaluated via billable telephone visit d/t COVID-19 restrictions. Time spent: 30 min  Provider location: onsite ([facility] Solid Organ Transplant  Outpatient MNT: Post Liver Transplant    Time Spent: 30 minutes  Visit Type: follow up (saw pre txp 1/2022)  Referring Physician: Eddie   Pt accompanied by: self    Date of txp: liver txp 11/2/2022    Nutrition Assessment/Diet Recall  H/o RNY (2006), EMERALD (pre-txp), on Creon 80405 (1 with snacks- 2 with meals). No issues with steatorrhea; reports diarrhea, but is \"backed up\" so is taking Miralax and senna.     Reports poor appetite since txp; no improvement since she has been home from the hospital. Meat is generally less appealing. Many foods don't taste good or all taste the same. Some nausea, for which she takes zofran (sometimes helps).     Wt Readings from Last 10 Encounters:   12/29/22 75.5 kg (166 lb 6.4 oz)   12/15/22 81 kg (178 lb 9.6 oz)   12/08/22 81.7 kg (180 lb 1.6 oz)   12/06/22 78.6 kg (173 lb 3.2 oz)   11/28/22 80.3 kg (177 lb)   11/21/22 83.1 kg (183 lb 4.8 oz)   11/10/22 90.9 kg (200 lb 6.4 oz)   11/10/22 89.5 kg (197 lb 5 oz)   09/15/22 78.2 kg (172 lb 8 oz)   07/14/22 75.9 kg (167 lb 4.8 oz)     BF- scrambled eggs w/ cheese + occasional toast + small part of banana or fruit (yet watching potassium lately)  Sn- cottage cheese, cheese stick, beef stick, boiled egg, Greek yogurt     L- sandwich (turkey & cheese, lettuce), not eating a lot of leftovers \"not sure what to eat\"  Got [employer] delivered- gets 4 meals out of 1 order  D- delivery meal (small portion); friend brought ziti (4 bites), salad, cottage cheese; chicken in the air fryer + baked potato + green beans/asparagus; chicken pot pie   HS- cheese toast   Protein supplements- Ensure Clear x 2-3/day; purchases on [employer]   Reports protein powders or bars are hit or miss with after taste    Retaining water- sees lymphedema 2x/week    Labs  Some high K, more in Nov  Some high " phos    Nutrition Diagnosis  Predicted suboptimal protein intake related to increased protein needs s/p transplant.    Nutrition Intervention  1. Reviewed how common poor appetite, loss of taste/appeal, etc is for first few months after transplant. She is still trying to include protein as able, in form of food, supplements, etc.     2. Reviewed importance of food safety and increased risk for food-borne illness post-txp. Also discussed potential need to monitor K+, CHO, and Na+ intakes d/t medication side effects.     3. Avoid the following post txp d/t risk for rejection, unknown effects on the organs, and/or potential interactions with immunosuppressants:  - Herbal, Chinese, holistic, chiropractic, natural, alternative medicines and supplements  - Detoxes and cleanses  - Weight loss pills  - Protein powders or other products with extracts or herbs (ie green tea extract)    Patient Understanding: Pt verbalized understanding of education provided.  Expected Engagement: Good  Follow-Up Plans: [place]     Nutrition Goals  Continue with protein at meals/snacks + supplements until appetite improves    Kelin Mcginnis, RD, LD, CCTD

## 2023-01-10 ENCOUNTER — TELEPHONE (OUTPATIENT)
Dept: TRANSPLANT | Facility: CLINIC | Age: 53
End: 2023-01-10

## 2023-01-10 ENCOUNTER — VIRTUAL VISIT (OUTPATIENT)
Dept: TRANSPLANT | Facility: CLINIC | Age: 53
End: 2023-01-10
Attending: TRANSPLANT SURGERY
Payer: COMMERCIAL

## 2023-01-10 DIAGNOSIS — Z94.4 LIVER REPLACED BY TRANSPLANT (H): ICD-10-CM

## 2023-01-10 RX ORDER — TACROLIMUS 5 MG/1
5 CAPSULE ORAL 2 TIMES DAILY
Qty: 90 CAPSULE | Refills: 3 | Status: SHIPPED | OUTPATIENT
Start: 2023-01-10 | End: 2023-01-11

## 2023-01-10 RX ORDER — TACROLIMUS 0.5 MG/1
0.5 CAPSULE ORAL EVERY 12 HOURS
Qty: 180 CAPSULE | Refills: 3 | Status: SHIPPED | OUTPATIENT
Start: 2023-01-10 | End: 2023-01-11

## 2023-01-10 NOTE — TELEPHONE ENCOUNTER
ISSUE:   Tacrolimus IR level 7.8 on 1/9 , goal 8-12, dose 5 mg BID.    PLAN:   Please call patient and confirm this was an accurate 12-hour trough. Verify Tacrolimus IR dose 5 mg BID. Confirm no new medications or illness. Confirm no missed doses. If accurate trough and accurate dose, increase Tacrolimus IR dose to 5 mg AM, 5.5 mg PM and repeat labs on Thursday as scheduled.    Ensure adequate hydration.    Edwina Fallon RN      OUTCOME:   Spoke with patient, they confirm accurate trough level and current dose 5 mg BID. Patient confirmed dose change to 5 mg am, 5.5 mg pm and to repeat labs on thursday . Orders sent to preferred pharmacy for dose change and lab for repeat labs. Patient voiced understanding of plan.     Ariana Liao LPN

## 2023-01-10 NOTE — LETTER
"    1/10/2023         RE: Susan Puckett  [address] [id number]        Dear Colleague,    Thank you for referring your patient, Susan Puckett, to the [facility]. Please see a copy of my visit note below.    Pt evaluated via billable telephone visit d/t COVID-19 restrictions. Time spent: 30 min  Provider location: onsite ([facility])    [facility] Transplant  Outpatient MNT: Post Liver Transplant    Time Spent: 30 minutes  Visit Type: follow up (saw pre txp 1/2022)  Referring Physician: Eddie   Pt accompanied by: self    Date of txp: liver txp 11/2/2022    Nutrition Assessment/Diet Recall  H/o RNY (2006), EMERALD (pre-txp), on Creon 91584 (1 with snacks- 2 with meals). No issues with steatorrhea; reports diarrhea, but is \"backed up\" so is taking Miralax and senna.     Reports poor appetite since txp; no improvement since she has been home from the hospital. Meat is generally less appealing. Many foods don't taste good or all taste the same. Some nausea, for which she takes zofran (sometimes helps).     Wt Readings from Last 10 Encounters:   12/29/22 75.5 kg (166 lb 6.4 oz)   12/15/22 81 kg (178 lb 9.6 oz)   12/08/22 81.7 kg (180 lb 1.6 oz)   12/06/22 78.6 kg (173 lb 3.2 oz)   11/28/22 80.3 kg (177 lb)   11/21/22 83.1 kg (183 lb 4.8 oz)   11/10/22 90.9 kg (200 lb 6.4 oz)   11/10/22 89.5 kg (197 lb 5 oz)   09/15/22 78.2 kg (172 lb 8 oz)   07/14/22 75.9 kg (167 lb 4.8 oz)     BF- scrambled eggs w/ cheese + occasional toast + small part of banana or fruit (yet watching potassium lately)  Sn- cottage cheese, cheese stick, beef stick, boiled egg, Greek yogurt     L- sandwich (turkey & cheese, lettuce), not eating a lot of leftovers \"not sure what to eat\"  Got [employer] delivered- gets 4 meals out of 1 order  D- delivery meal (small portion); friend brought ziti (4 bites), salad, cottage cheese; chicken in the air fryer + baked potato + green beans/asparagus; chicken pot " pie   HS- cheese toast   Protein supplements- Ensure Clear x 2-3/day; purchases on [employer]   Reports protein powders or bars are hit or miss with after taste    Retaining water- sees lymphedema 2x/week    Labs  Some high K, more in Nov  Some high phos    Nutrition Diagnosis  Predicted suboptimal protein intake related to increased protein needs s/p transplant.    Nutrition Intervention  1. Reviewed how common poor appetite, loss of taste/appeal, etc is for first few months after transplant. She is still trying to include protein as able, in form of food, supplements, etc.     2. Reviewed importance of food safety and increased risk for food-borne illness post-txp. Also discussed potential need to monitor K+, CHO, and Na+ intakes d/t medication side effects.     3. Avoid the following post txp d/t risk for rejection, unknown effects on the organs, and/or potential interactions with immunosuppressants:  - Herbal, Chinese, holistic, chiropractic, natural, alternative medicines and supplements  - Detoxes and cleanses  - Weight loss pills  - Protein powders or other products with extracts or herbs (ie green tea extract)    Patient Understanding: Pt verbalized understanding of education provided.  Expected Engagement: Good  Follow-Up Plans: [facility]     Nutrition Goals  Continue with protein at meals/snacks + supplements until appetite improves    Kelin Mcginnis, RD, LD, CCTD

## 2023-01-11 ENCOUNTER — TELEPHONE (OUTPATIENT)
Dept: PHARMACY | Facility: CLINIC | Age: 53
End: 2023-01-11

## 2023-01-11 DIAGNOSIS — Z94.4 LIVER REPLACED BY TRANSPLANT (H): ICD-10-CM

## 2023-01-11 RX ORDER — TACROLIMUS 0.5 MG/1
0.5 CAPSULE ORAL EVERY EVENING
Qty: 90 CAPSULE | Refills: 3 | Status: SHIPPED | OUTPATIENT
Start: 2023-01-11 | End: 2023-01-13

## 2023-01-11 RX ORDER — TACROLIMUS 5 MG/1
5 CAPSULE ORAL 2 TIMES DAILY
Qty: 90 CAPSULE | Refills: 3 | Status: SHIPPED | OUTPATIENT
Start: 2023-01-11 | End: 2023-01-13

## 2023-01-11 RX ORDER — TACROLIMUS 1 MG/1
CAPSULE ORAL
Qty: 360 CAPSULE | Refills: 3 | Status: SHIPPED | OUTPATIENT
Start: 2023-01-11 | End: 2023-01-13

## 2023-01-11 NOTE — TELEPHONE ENCOUNTER
Spoke with the patient on the creon informed her that there is a free drug program through [employer] which will help get the medication for free through the manufacture.   She wanted more help for all her medication rather than for just creon. I provided her the low subsidy income number to see if she can qualify for extra help. She would like me to call her back sometime tomorrow ok to leave a detailed message.  [facility] linkage line [phone]

## 2023-01-12 ENCOUNTER — HOSPITAL ENCOUNTER (OUTPATIENT)
Dept: OCCUPATIONAL THERAPY | Facility: CLINIC | Age: 53
Setting detail: THERAPIES SERIES
Discharge: HOME | End: 2023-01-12
Attending: INTERNAL MEDICINE
Payer: COMMERCIAL

## 2023-01-12 ENCOUNTER — LAB (OUTPATIENT)
Dept: LAB | Facility: CLINIC | Age: 53
End: 2023-01-12
Payer: COMMERCIAL

## 2023-01-12 DIAGNOSIS — D72.819 LEUKOPENIA, UNSPECIFIED TYPE: Primary | ICD-10-CM

## 2023-01-12 DIAGNOSIS — D72.819 LEUKOPENIA, UNSPECIFIED TYPE: ICD-10-CM

## 2023-01-12 DIAGNOSIS — Z94.4 LIVER REPLACED BY TRANSPLANT (H): ICD-10-CM

## 2023-01-12 LAB
ALBUMIN SERPL-MCNC: 3 G/DL (ref 3.4–5)
ALP SERPL-CCNC: 76 U/L (ref 40–150)
ALT SERPL W P-5'-P-CCNC: 13 U/L (ref 0–50)
ANION GAP SERPL CALCULATED.3IONS-SCNC: 6 MMOL/L (ref 3–14)
AST SERPL W P-5'-P-CCNC: 17 U/L (ref 0–45)
BASOPHILS # BLD MANUAL: 0 10E3/UL (ref 0–0.2)
BASOPHILS NFR BLD MANUAL: 2 %
BILIRUB DIRECT SERPL-MCNC: 0.2 MG/DL (ref 0–0.2)
BILIRUB SERPL-MCNC: 0.3 MG/DL (ref 0.2–1.3)
BUN SERPL-MCNC: 14 MG/DL (ref 7–30)
CALCIUM SERPL-MCNC: 8.3 MG/DL (ref 8.5–10.1)
CHLORIDE BLD-SCNC: 114 MMOL/L (ref 94–109)
CO2 SERPL-SCNC: 22 MMOL/L (ref 20–32)
CREAT SERPL-MCNC: 1.15 MG/DL (ref 0.52–1.04)
EOSINOPHIL # BLD MANUAL: 0.1 10E3/UL (ref 0–0.7)
EOSINOPHIL NFR BLD MANUAL: 3 %
ERYTHROCYTE [DISTWIDTH] IN BLOOD BY AUTOMATED COUNT: 13.7 % (ref 10–15)
GFR SERPL CREATININE-BSD FRML MDRD: 58 ML/MIN/1.73M2
GLUCOSE BLD-MCNC: 193 MG/DL (ref 70–99)
HCT VFR BLD AUTO: 31 % (ref 35–47)
HGB BLD-MCNC: 9.5 G/DL (ref 11.7–15.7)
LYMPHOCYTES # BLD MANUAL: 0.9 10E3/UL (ref 0.8–5.3)
LYMPHOCYTES NFR BLD MANUAL: 43 %
MAGNESIUM SERPL-MCNC: 1.4 MG/DL (ref 1.6–2.3)
MCH RBC QN AUTO: 29.8 PG (ref 26.5–33)
MCHC RBC AUTO-ENTMCNC: 30.6 G/DL (ref 31.5–36.5)
MCV RBC AUTO: 97 FL (ref 78–100)
METAMYELOCYTES # BLD MANUAL: 0.3 10E3/UL
METAMYELOCYTES NFR BLD MANUAL: 12 %
MONOCYTES # BLD MANUAL: 0.1 10E3/UL (ref 0–1.3)
MONOCYTES NFR BLD MANUAL: 4 %
MYELOCYTES # BLD MANUAL: 0 10E3/UL
MYELOCYTES NFR BLD MANUAL: 1 %
NEUTROPHILS # BLD MANUAL: 0.8 10E3/UL (ref 1.6–8.3)
NEUTROPHILS NFR BLD MANUAL: 35 %
PHOSPHATE SERPL-MCNC: 4.5 MG/DL (ref 2.5–4.5)
PLAT MORPH BLD: ABNORMAL
PLATELET # BLD AUTO: 163 10E3/UL (ref 150–450)
POTASSIUM BLD-SCNC: 4.3 MMOL/L (ref 3.4–5.3)
PROT SERPL-MCNC: 6.2 G/DL (ref 6.8–8.8)
RBC # BLD AUTO: 3.19 10E6/UL (ref 3.8–5.2)
RBC MORPH BLD: ABNORMAL
SODIUM SERPL-SCNC: 142 MMOL/L (ref 133–144)
TACROLIMUS BLD-MCNC: 6.9 UG/L (ref 5–15)
TME LAST DOSE: NORMAL H
TME LAST DOSE: NORMAL H
WBC # BLD AUTO: 2.2 10E3/UL (ref 4–11)
WBC # BLD AUTO: 2.2 10E3/UL (ref 4–11)

## 2023-01-12 PROCEDURE — 36415 COLL VENOUS BLD VENIPUNCTURE: CPT

## 2023-01-12 PROCEDURE — 84100 ASSAY OF PHOSPHORUS: CPT

## 2023-01-12 PROCEDURE — 80053 COMPREHEN METABOLIC PANEL: CPT

## 2023-01-12 PROCEDURE — 80197 ASSAY OF TACROLIMUS: CPT

## 2023-01-12 PROCEDURE — 82248 BILIRUBIN DIRECT: CPT

## 2023-01-12 PROCEDURE — 97140 MANUAL THERAPY 1/> REGIONS: CPT | Mod: GO

## 2023-01-12 PROCEDURE — 85027 COMPLETE CBC AUTOMATED: CPT

## 2023-01-12 PROCEDURE — 85007 BL SMEAR W/DIFF WBC COUNT: CPT

## 2023-01-12 PROCEDURE — 85048 AUTOMATED LEUKOCYTE COUNT: CPT

## 2023-01-12 PROCEDURE — 83735 ASSAY OF MAGNESIUM: CPT

## 2023-01-13 ENCOUNTER — TELEPHONE (OUTPATIENT)
Dept: TRANSPLANT | Facility: CLINIC | Age: 53
End: 2023-01-13

## 2023-01-13 ENCOUNTER — VIRTUAL VISIT (OUTPATIENT)
Dept: TRANSPLANT | Facility: CLINIC | Age: 53
End: 2023-01-13
Attending: TRANSPLANT SURGERY
Payer: COMMERCIAL

## 2023-01-13 DIAGNOSIS — G62.9 PERIPHERAL NEUROPATHY: ICD-10-CM

## 2023-01-13 DIAGNOSIS — G44.89 OTHER HEADACHE SYNDROME: ICD-10-CM

## 2023-01-13 DIAGNOSIS — Z94.4 LIVER REPLACED BY TRANSPLANT (H): ICD-10-CM

## 2023-01-13 PROCEDURE — 99213 OFFICE O/P EST LOW 20 MIN: CPT | Mod: 95 | Performed by: NURSE PRACTITIONER

## 2023-01-13 RX ORDER — TACROLIMUS 0.5 MG/1
0.5 CAPSULE ORAL PRN
Qty: 90 CAPSULE | Refills: 3 | Status: SHIPPED | OUTPATIENT
Start: 2023-01-13 | End: 2023-02-09

## 2023-01-13 RX ORDER — TACROLIMUS 1 MG/1
1 CAPSULE ORAL EVERY MORNING
Qty: 90 CAPSULE | Refills: 3 | Status: SHIPPED | OUTPATIENT
Start: 2023-01-13 | End: 2023-02-09

## 2023-01-13 RX ORDER — TACROLIMUS 5 MG/1
5 CAPSULE ORAL 2 TIMES DAILY
Qty: 90 CAPSULE | Refills: 3 | Status: SHIPPED | OUTPATIENT
Start: 2023-01-13 | End: 2023-02-07

## 2023-01-13 RX ORDER — GABAPENTIN 100 MG/1
200 CAPSULE ORAL 3 TIMES DAILY
Qty: 180 CAPSULE | Refills: 2 | Status: SHIPPED | OUTPATIENT
Start: 2023-01-13 | End: 2023-05-10

## 2023-01-13 NOTE — LETTER
"    1/13/2023         RE: Susan Puckett  [address] [facility] [id number]        Dear Colleague,    Thank you for referring your patient, Susan Puckett, to the [facility]. Please see a copy of my visit note below.    Transplant Surgery -OUTPATIENT IMMUNOSUPPRESSION PROGRESS NOTE    Date of Visit: 01/13/2023    Transplants:  11/2/2022 (Liver); Postoperative day:  72  ASSESMENT AND PLAN:  1.Graft Function:LFTS stable   2.Immunosuppression Management: no change today.   3.Hypertension: SBP average 120- 140s  4.Renal Function:Cr up slightly but stable   5.Lab frequency:twice weekly   6.Other: Abd pain - discussed taking hydrocodone and plain tylenol throughout the day not to exceed 2gram in 24hrs.  Retry flexeril. Report back on this.     F/U with endo regarding hypoglycemia. Push fluids. Start magnesium today.      Date: January 13, 2023    Transplant:  [x]                             Liver [x]                              Kidney []                             Pancreas []                              Other:             Chief Complaint:No chief complaint on file.    History of Present Illness:    Has CT scan pending for Saturday through PCP for abd pain she's had for the last 1 - 1.5 month. .    Pain meds she has not that helpful. Taking hydrocodone 5/325mg usually 1 per day. Hasn't taken flexeril recently - didn't work. Willing to retry.   Taking gabapentin consistently and this has helped.      Glucose readings up and down - 70s at night and then in 200s.  Occasional 300s.   On Lantus taking 7 units, novolog sliding scale with meals  - Endo pending next month.      Appetite is fair. Occasional nausea - uses Zofran as needed which helps.   Drinking water the best she can - feels full fast. Maybe gets 48oz per day.   Drinking ensure - 1-2 per day.    Reports \"always having diarrhea\" uses metamucil. Sometimes stool more firm, has about 2 stools per day.     Starting new magnesium today. "         Patient Active Problem List   Diagnosis     Anxiety     Ascites     Benign tumor of colon     Brow ptosis, bilateral     Chronic diarrhea     Chronic peritoneal effusion     Colitis     Endometriosis     Hepatic encephalopathy     History of gastric bypass     Insomnia     Hypothyroidism     HSV-1 (herpes simplex virus 1) infection     Iron deficiency anemia     Raynaud phenomenon     Ptosis of eyelid     Pleural effusion associated with hepatic disorder     Thrombocytopenia (H)     Other headache syndrome     Nausea     Major depressive disorder, recurrent episode, moderate (H)     Infertility management     History of gastric ulcer     Allergic rhinitis     Tubular adenoma of colon     Vitamin D deficiency     Visual field defect     Type 2 diabetes mellitus without complication, without long-term current use of insulin (H)     Primary hypothyroidism     H/O gastric bypass     Exposure to COVID-19 virus     Abdominal pain, epigastric     Steroid-induced hyperglycemia     Hypomagnesemia     Prolonged Q-T interval on ECG     Liver transplanted (H)     Acute kidney failure with tubular necrosis (H)     Hypervolemia     Immunosuppressed status (H)     Anemia in other chronic diseases classified elsewhere     Hyponatremia     Hypoalbuminemia     GEORGETTE (acute kidney injury) (H)     Acute post-operative pain     Seizure due to hypoglycemia (H)     Migraine     Moderate malnutrition (H)     Pancreatic insufficiency     Constipation     Hypophosphatemia     Generalized weakness     Status post liver transplantation (H)     Bilateral leg pain     Acute cystitis without hematuria     Bilateral lower extremity edema     Urinary tract infection     Generalized abdominal pain     Vomiting and diarrhea     Leukopenia     SOCIAL /FAMILY HISTORY: [x]                  No recent change    Past Medical History:   Diagnosis Date     Anemia      Anxiety      Cold sore      Depressive disorder      Diabetes (H)     Type 2 DM/No  Insulin      Encephalopathy      Esophageal varices without bleeding (H)     grade I     History of blood transfusion     10/2019     History of seizure     diabetic seizure     Insomnia      Liver cirrhosis secondary to CUETO (H) 2020     Migraine      Pleural effusion      Thrombocytopenia (H)      Thyroid disease      Past Surgical History:   Procedure Laterality Date     APPENDECTOMY           BENCH LIVER N/A 2022    Procedure: Bench liver;  Surgeon: Delbert Morgan MD;  Location: [facility] OR     COLONOSCOPY      2020 at [facility]      ENT SURGERY      tempanoplasty     GI SURGERY      EGD 2020 at [facility]      GYN SURGERY      laparoscopic ablation     IR TRANSVEN INTRAHEPATIC PORTOSYST SHUNT  2021     MAMMOPLASTY REDUCTION BILATERAL       MS STAB PHELBECTOMY VARICOSE VEINS, LESS THAN 10 INCISIONS, ONE EXTREMITY       RNY gastric bypass  2006    retoocolic, retrogastric, [facility]     TONSILLECTOMY & ADENOIDECTOMY Bilateral      TRANSPLANT LIVER RECIPIENT  DONOR N/A 2022    Procedure: TRANSPLANT, LIVER, RECIPIENT,  DONOR;  Surgeon: Delbert Morgan MD;  Location: [facility] OR     WISDOM TEETH EXTRACTION       Social History     Socioeconomic History     Marital status:      Spouse name: Not on file     Number of children: Not on file     Years of education: Not on file     Highest education level: Bachelor's degree (e.g., BA, AB, BS)   Occupational History     Not on file   Tobacco Use     Smoking status: Never     Smokeless tobacco: Never   Substance and Sexual Activity     Alcohol use: Not Currently     Comment: Last drink was in 2017     Drug use: Never     Sexual activity: Not on file   Other Topics Concern     Parent/sibling w/ CABG, MI or angioplasty before 65F 55M? Not Asked   Social History Narrative     Not on file     Social Determinants of Health     Financial Resource Strain: Not on file   Food Insecurity: Not on file    Transportation Needs: Not on file   Physical Activity: Not on file   Stress: Not on file   Social Connections: Not on file   Intimate Partner Violence: Not on file   Housing Stability: Not on file     Prescription Medications as of 1/13/2023       Rx Number Disp Refills Start End Last Dispensed Date Next Fill Date Owning Pharmacy     magnesium glycinate lysinate chelate (DOCTOR'S BEST) 100 MG TABS tablet  540 tablet 3 1/6/2023    [facility] [address]    Sig: Take 2 tablets (200 mg) by mouth 3 times daily    Class: E-Prescribe    Route: Oral    acetaminophen (TYLENOL) 325 MG tablet  100 tablet 0 12/12/2022    [facility] [address] [address]    Sig: Take 3 tablets (975 mg) by mouth every 8 hours as needed for mild pain    Class: E-Prescribe    Route: Oral    amylase-lipase-protease (CREON 24) 85507-37872 units CPEP per EC capsule  180 capsule 3 11/18/2022    [facility] [address]    Sig: Take 2 capsules by mouth 3 times daily (with meals)    Class: E-Prescribe    Route: Oral    amylase-lipase-protease (CREON 24) 89234-67731 units CPEP per EC capsule  90 capsule 3 11/18/2022    [facility] [address]    Sig: Take 1 capsule by mouth Take with snacks or supplements (with snacks)    Class: E-Prescribe    Route: Oral    aspirin (ASA) 81 MG chewable tablet  90 tablet 3 11/18/2022    [facility] [address] [address]    Sig: Take 1 tablet (81 mg) by mouth daily    Class: E-Prescribe    Route: Oral    calcium carbonate-vitamin D (OSCAL) 500-5 MG-MCG tablet    12/1/2022        Sig: Take 1 tablet by mouth 2 times daily    Class: Historical    Route: Oral    capsaicin 0.035 % CREA            Sig: Externally apply topically 3 times daily    Class: Historical    Route: Apply externally    Continuous Blood Gluc   (DEXCOM G6 ) LUNA    6/1/2021        Sig: Use as directed for continuous glucose monitoring.    Class: Historical    Continuous Blood Gluc Sensor (DEXCOM G6 SENSOR) MISC    6/1/2021        Sig: Use as directed for continuous glucose monitoring.  Change sensor every 10 days.    Class: Historical    Continuous Blood Gluc Transmit (DEXCOM G6 TRANSMITTER) MISC    6/1/2021        Sig: Use as directed for continuous glucose monitoring.  Change every 3 months.    Class: Historical    cyanocobalamin (VITAMIN B-12) 1000 MCG tablet    9/2/2020    [facility] [address] [place] [address]    Sig: Take 1,000 mcg by mouth every 7 days Take 1 tablet by mouth every 7 days on Wednesdays    Class: Historical    Route: Oral    cyclobenzaprine (FLEXERIL) 10 MG tablet  30 tablet 0 12/6/2022    [facility] [place], [place] - [address]    Sig: Take 1 tablet (10 mg) by mouth every 8 hours as needed for muscle spasms    Class: E-Prescribe    Route: Oral    Renewals     Renewal requests to authorizing provider (Bhavana Romo, NP) <b>prohibited</b>          dapsone (ACZONE) 25 MG tablet  180 tablet 1 12/13/2022    [facility] [place] [address] [address]     Sig: Take 2 tablets (50 mg) by mouth daily    Class: E-Prescribe    Route: Oral    escitalopram (LEXAPRO) 20 MG tablet    11/28/2022        Sig: Take 20 mg by mouth daily    Class: Historical    Route: Oral    folic acid (FOLVITE) 1 MG tablet    2/26/2020    [facility] [address] [place] [address]    Sig: Take 1 mg by mouth every morning    Class: Historical    Route: Oral    gabapentin (NEURONTIN) 100 MG capsule  180 capsule 2 1/13/2023    [facility] [place] [address] [address]     Sig: Take 2 capsules (200 mg) by mouth 3 times daily    Class: E-Prescribe    Route: Oral    insulin aspart (NOVOLOG VIAL) 100 UNITS/ML vial    11/16/2022         Sig: Inject 1-10 Units Subcutaneous 4 times daily (with meals and nightly)    Class: No Print Out    Notes to Pharmacy: 0.5 unit(s) per 14g cho AC breakfast/lunch AND 0.5 unit(s) per 20 g cho for dinner AND NO meal insulin after 2000 hrs.  Snack coverage:   0800 - 1700 - 0.5 unit(s) per 14 g cho and 1701 - 2000 0.5 unit(s) per 20 g cho    Route: Subcutaneous    insulin glargine (LANTUS PEN) 100 UNIT/ML pen    12/6/2022        Sig: Inject 11 Units Subcutaneous every morning (before breakfast)    Class: No Print Out    Notes to Pharmacy: If Lantus is not covered by insurance, may substitute Basaglar or Semglee or other insulin glargine product per insurance preference at same dose and frequency.      Route: Subcutaneous    insulin pen needle (BD GRISEL U/F) 32G X 4 MM miscellaneous    6/1/2021        Sig: Inject 1 each Subcutaneous    Class: Historical    Route: Subcutaneous    levothyroxine (SYNTHROID/LEVOTHROID) 150 MCG tablet    5/23/2022        Sig: Take 1 tablet by mouth daily    Class: Historical    Route: Oral    LORazepam (ATIVAN) 0.5 MG tablet    11/28/2022        Sig: Take 0.5 mg by mouth nightly as needed For anxiety    Class: Historical    Route: Oral    melatonin 5 MG tablet            Sig: Take 5 mg by mouth At Bedtime    Class: Historical    Route: Oral    multivitamin CF FORMULA (DEKAS PLUS) capsule  90 capsule 3 3/1/2022    [facility] [address] [place], [place] - [address] [address]    Sig: Take 1 capsule by mouth daily    Class: E-Prescribe    Route: Oral    mycophenolate (GENERIC EQUIVALENT) 250 MG capsule  540 capsule 3 12/29/2022    [facility] [place], [place] - [address]    Sig: Take 2 capsules (500 mg) by mouth two times daily    Class: E-Prescribe    Route: Oral    ondansetron (ZOFRAN ODT) 8 MG ODT tab  30 tablet 0 12/6/2022    [facility] [place], [place] - [address]    Sig: Take 1 tablet (8 mg) by mouth every 8 hours as  needed for nausea    Class: E-Prescribe    Route: Oral    polyethylene glycol (MIRALAX) 17 GM/Dose powder  510 g  11/10/2022        Sig: Take 17 g by mouth daily    Class: Transitional    Route: Oral    prochlorperazine (COMPAZINE) 10 MG tablet  10 tablet 0 12/15/2022    [facility] [address] [place] [facility] [address] [facility]    Sig: Take 0.5 tablets (5 mg) by mouth every 8 hours as needed for nausea or vomiting    Class: E-Prescribe    Route: Oral    rizatriptan (MAXALT) 10 MG tablet    11/28/2022        Sig: Take 10 mg by mouth as needed Take 1 Tablet (10 mg) by mouth as needed for Headache. at onset of migraine. May repeat in 2 hr if needed up to 30mg in 24 hr & MAX use 5 days/month. Do not use within 24 hours of an ergotamine preparation or a different triptan.    Class: Historical    Route: Oral    sitagliptin (JANUVIA) 100 MG tablet  30 tablet 0 11/17/2022    [facility] [place], [place] - [address]    Sig: Take 1 tablet (100 mg) by mouth daily    Class: E-Prescribe    Route: Oral    tacrolimus (GENERIC EQUIVALENT) 0.5 MG capsule  90 capsule 3 1/13/2023    [facility] [place], [place] - [address]    Sig: Take 1 capsule (0.5 mg) by mouth as needed (For dose changes) Total dose 6 mg AM, 5 mg PM    Class: E-Prescribe    Notes to Pharmacy: TXP DT 11/2/2022 (Liver) TXP Dischg DT 11/10/2022 DX Liver replaced by transplant Z94.4 [facility] ([place], [place])    Route: Oral    tacrolimus (GENERIC EQUIVALENT) 1 MG capsule  90 capsule 3 1/13/2023    [facility] [place], [place] - [address]    Sig: Take 1 capsule (1 mg) by mouth every morning Total dose 6 mg AM, 5 mg PM    Class: E-Prescribe    Notes to Pharmacy: TXP DT 11/2/2022 (Liver) TXP Dischg DT 11/10/2022 DX Liver replaced by transplant Z94.4 [facility] ([place], [place])    Route:  Oral    tacrolimus (GENERIC EQUIVALENT) 5 MG capsule  90 capsule 3 1/13/2023    [facility] [place], [place] - [address]    Sig: Take 1 capsule (5 mg) by mouth 2 times daily Total dose 6 mg AM, 5 mg PM    Class: E-Prescribe    Notes to Pharmacy: TXP DT 11/2/2022 (Liver) TXP Dischg DT 11/10/2022 DX Liver replaced by transplant Z94.4 [facility] ([place], [place])    Route: Oral    topiramate (TOPAMAX) 25 MG tablet  90 tablet 2 12/6/2022    [facility] - [place], [place] - [address]    Sig: Take 3 tablets (75 mg) by mouth At Bedtime    Class: E-Prescribe    Route: Oral    Renewals     Renewal requests to authorizing provider (Bhavana Romo, NP) <b>prohibited</b>          traZODone (DESYREL) 50 MG tablet    11/28/2022        Sig: Take 50 mg by mouth At Bedtime    Class: Historical    Route: Oral    valGANciclovir (VALCYTE) 450 MG tablet  90 tablet 3 1/2/2023        Sig: Take 1 tablet (450 mg) by mouth daily    Class: Historical    Notes to Pharmacy: HOLD as of 1/25 due to low WBC (low risk for CMV)    Route: Oral        Methylprednisolone, Adhesive tape, Oxycodone, Droperidol, Nsaids, and Prednisone   REVIEW OF SYSTEMS (check box if normal)  [x]               GENERAL  [x]                 PULMONARY [x]                GENITOURINARY  [x]                CNS                 [x]                 CARDIAC  [x]                 ENDOCRINE  [x]                EARS,NOSE,THROAT [x]                 GASTROINTESTINAL [x]                 NEUROLOGIC    [x]                MUSCLOSKELTAL  [x]                  HEMATOLOGY      PHYSICAL EXAM (check box if normal)There were no vitals taken for this visit.        [x]            GENERAL:    [x]       EYES:  ICTERIC   []        YES  []                    NO  [x]           EXTREMITIES: Clubbing []       Y     []           N    []           EARS, NOSE, THROAT: Membranes Moist    YES   []                    NO []                  []           LUNGS:  CLEAR    YES       []                  NO    []                                [x]           SKIN: Jaundice           YES       []                  NO    []                   Rash: YES       []                  NO    [x]                                     []             HEART: Regular Rate          YES       []                  NO    []                   Incision Clean:  YES       []                  NO    []                                []                    ABDOMEN: Organomegaly YES       []                  NO    []                       [x]                    NEUROLOGICAL:  Nonfocal  YES       [x]                  NO    []                       []                    Hernia YES       []                  NO    []                   PSYCHIATRIC:  Appropriate  YES       []                  NO    []                       OTHER:                                                                                                   PAIN SCALE:: 2      Again, thank you for allowing me to participate in the care of your patient.        Sincerely,        CAIN Breaux CNP

## 2023-01-13 NOTE — TELEPHONE ENCOUNTER
ISSUE:   Tacrolimus IR level 6.9 on 1/12, goal 8-12, dose 5 mg BID. Patient reports she hasn't been able to get the 0.5 mg capsules yet.    PLAN:   Please call patient and confirm this was an accurate 12-hour trough. Verify Tacrolimus IR dose 5 mg BID. Confirm no new medications or illness. Confirm no missed doses. If accurate trough and accurate dose, increase Tacrolimus IR dose to 6 mg AM, 5 mg PM and repeat labs on Monday.     Patient also asking for refill on Gabapentin. Prescription sent.    OUTCOME:   Spoke with patient, they confirm accurate trough level and current dose 5 mg BID. Patient confirmed dose change to 6 mg AM, 5 mg PM and to repeat labs on Monday. Orders sent to preferred pharmacy for dose change and lab for repeat labs. Patient voiced understanding of plan.     Patient will be getting 0.5 mg capsules today, instructed to hold onto them as we will utilize them in the future. Patient understands.     Edwina Fallon RN

## 2023-01-13 NOTE — PROGRESS NOTES
"Transplant Surgery -OUTPATIENT IMMUNOSUPPRESSION PROGRESS NOTE    Date of Visit: 01/13/2023    Transplants:  11/2/2022 (Liver); Postoperative day:  72  ASSESMENT AND PLAN:  1.Graft Function:LFTS stable   2.Immunosuppression Management: no change today.   3.Hypertension: SBP average 120- 140s  4.Renal Function:Cr up slightly but stable   5.Lab frequency:twice weekly   6.Other: Abd pain - discussed taking hydrocodone and plain tylenol throughout the day not to exceed 2gram in 24hrs.  Retry flexeril. Report back on this.     F/U with endo regarding hypoglycemia. Push fluids. Start magnesium today.      Date: January 13, 2023    Transplant:  [x]                             Liver [x]                              Kidney []                             Pancreas []                              Other:             Chief Complaint:No chief complaint on file.    History of Present Illness:    Has CT scan pending for Saturday through PCP for abd pain she's had for the last 1 - 1.5 month. .    Pain meds she has not that helpful. Taking hydrocodone 5/325mg usually 1 per day. Hasn't taken flexeril recently - didn't work. Willing to retry.   Taking gabapentin consistently and this has helped.      Glucose readings up and down - 70s at night and then in 200s.  Occasional 300s.   On Lantus taking 7 units, novolog sliding scale with meals  - Endo pending next month.      Appetite is fair. Occasional nausea - uses Zofran as needed which helps.   Drinking water the best she can - feels full fast. Maybe gets 48oz per day.   Drinking ensure - 1-2 per day.    Reports \"always having diarrhea\" uses metamucil. Sometimes stool more firm, has about 2 stools per day.     Starting new magnesium today.         Patient Active Problem List   Diagnosis     Anxiety     Ascites     Benign tumor of colon     Brow ptosis, bilateral     Chronic diarrhea     Chronic peritoneal effusion     Colitis     Endometriosis     Hepatic encephalopathy     History of " gastric bypass     Insomnia     Hypothyroidism     HSV-1 (herpes simplex virus 1) infection     Iron deficiency anemia     Raynaud phenomenon     Ptosis of eyelid     Pleural effusion associated with hepatic disorder     Thrombocytopenia (H)     Other headache syndrome     Nausea     Major depressive disorder, recurrent episode, moderate (H)     Infertility management     History of gastric ulcer     Allergic rhinitis     Tubular adenoma of colon     Vitamin D deficiency     Visual field defect     Type 2 diabetes mellitus without complication, without long-term current use of insulin (H)     Primary hypothyroidism     H/O gastric bypass     Exposure to COVID-19 virus     Abdominal pain, epigastric     Steroid-induced hyperglycemia     Hypomagnesemia     Prolonged Q-T interval on ECG     Liver transplanted (H)     Acute kidney failure with tubular necrosis (H)     Hypervolemia     Immunosuppressed status (H)     Anemia in other chronic diseases classified elsewhere     Hyponatremia     Hypoalbuminemia     GEORGETTE (acute kidney injury) (H)     Acute post-operative pain     Seizure due to hypoglycemia (H)     Migraine     Moderate malnutrition (H)     Pancreatic insufficiency     Constipation     Hypophosphatemia     Generalized weakness     Status post liver transplantation (H)     Bilateral leg pain     Acute cystitis without hematuria     Bilateral lower extremity edema     Urinary tract infection     Generalized abdominal pain     Vomiting and diarrhea     Leukopenia     SOCIAL /FAMILY HISTORY: [x]                  No recent change    Past Medical History:   Diagnosis Date     Anemia      Anxiety      Cold sore      Depressive disorder      Diabetes (H)     Type 2 DM/No Insulin      Encephalopathy      Esophageal varices without bleeding (H)     grade I     History of blood transfusion     10/2019     History of seizure     diabetic seizure     Insomnia      Liver cirrhosis secondary to CUETO (H) 05/28/2020      Migraine      Pleural effusion      Thrombocytopenia (H)      Thyroid disease      Past Surgical History:   Procedure Laterality Date     APPENDECTOMY           BENCH LIVER N/A 2022    Procedure: Bench liver;  Surgeon: Delbert Morgan MD;  Location: [facility] OR     COLONOSCOPY      2020 at [facility]      ENT SURGERY      tempanoplasty     GI SURGERY      EGD 2020 at [facility]      GYN SURGERY      laparoscopic ablation     IR TRANSVEN INTRAHEPATIC PORTOSYST SHUNT  2021     MAMMOPLASTY REDUCTION BILATERAL       FL STAB PHELBECTOMY VARICOSE VEINS, LESS THAN 10 INCISIONS, ONE EXTREMITY       RNY gastric bypass  2006    retoocolic, retrogastric, [facility]     TONSILLECTOMY & ADENOIDECTOMY Bilateral      TRANSPLANT LIVER RECIPIENT  DONOR N/A 2022    Procedure: TRANSPLANT, LIVER, RECIPIENT,  DONOR;  Surgeon: Delbert Morgan MD;  Location: [facility]     WISDOM TEETH EXTRACTION       Social History     Socioeconomic History     Marital status:      Spouse name: Not on file     Number of children: Not on file     Years of education: Not on file     Highest education level: Bachelor's degree (e.g., BA, AB, BS)   Occupational History     Not on file   Tobacco Use     Smoking status: Never     Smokeless tobacco: Never   Substance and Sexual Activity     Alcohol use: Not Currently     Comment: Last drink was in 2017     Drug use: Never     Sexual activity: Not on file   Other Topics Concern     Parent/sibling w/ CABG, MI or angioplasty before 65F 55M? Not Asked   Social History Narrative     Not on file     Social Determinants of Health     Financial Resource Strain: Not on file   Food Insecurity: Not on file   Transportation Needs: Not on file   Physical Activity: Not on file   Stress: Not on file   Social Connections: Not on file   Intimate Partner Violence: Not on file   Housing Stability: Not on file     Prescription Medications as of 2023       Rx  Number Disp Refills Start End Last Dispensed Date Next Fill Date Owning Pharmacy     magnesium glycinate lysinate chelate (DO[facility]) 100 MG TABS tablet  540 tablet 3 1/6/2023    [facility] [address] [address]    Sig: Take 2 tablets (200 mg) by mouth 3 times daily    Class: E-Prescribe    Route: Oral    acetaminophen (TYLENOL) 325 MG tablet  100 tablet 0 12/12/2022    [facility] [address] [address]    Sig: Take 3 tablets (975 mg) by mouth every 8 hours as needed for mild pain    Class: E-Prescribe    Route: Oral    amylase-lipase-protease (CREON 24) 27837-43466 units CPEP per EC capsule  180 capsule 3 11/18/2022    [facility] [address] [address]    Sig: Take 2 capsules by mouth 3 times daily (with meals)    Class: E-Prescribe    Route: Oral    amylase-lipase-protease (CREON 24) 16194-23636 units CPEP per EC capsule  90 capsule 3 11/18/2022    [facility] [address] [address]    Sig: Take 1 capsule by mouth Take with snacks or supplements (with snacks)    Class: E-Prescribe    Route: Oral    aspirin (ASA) 81 MG chewable tablet  90 tablet 3 11/18/2022    [facility] [address] [address]    Sig: Take 1 tablet (81 mg) by mouth daily    Class: E-Prescribe    Route: Oral    calcium carbonate-vitamin D (OSCAL) 500-5 MG-MCG tablet    12/1/2022        Sig: Take 1 tablet by mouth 2 times daily    Class: Historical    Route: Oral    capsaicin 0.035 % CREA            Sig: Externally apply topically 3 times daily    Class: Historical    Route: Apply externally    Continuous Blood Gluc  (DEXCOM G6 ) LUNA    6/1/2021        Sig: Use as directed for continuous glucose monitoring.    Class: Historical    Continuous Blood Gluc Sensor (DEXCOM G6 SENSOR) MISC    6/1/2021        Sig: Use as directed for continuous glucose  monitoring.  Change sensor every 10 days.    Class: Historical    Continuous Blood Gluc Transmit (DEXCOM G6 TRANSMITTER) MISC    6/1/2021        Sig: Use as directed for continuous glucose monitoring.  Change every 3 months.    Class: Historical    cyanocobalamin (VITAMIN B-12) 1000 MCG tablet    9/2/2020    [facility] [address] [place] [address]    Sig: Take 1,000 mcg by mouth every 7 days Take 1 tablet by mouth every 7 days on Wednesdays    Class: Historical    Route: Oral    cyclobenzaprine (FLEXERIL) 10 MG tablet  30 tablet 0 12/6/2022    [facility] [place], [place] - [address]    Sig: Take 1 tablet (10 mg) by mouth every 8 hours as needed for muscle spasms    Class: E-Prescribe    Route: Oral    Renewals     Renewal requests to authorizing provider (Bhavana Romo, NP) <b>prohibited</b>          dapsone (ACZONE) 25 MG tablet  180 tablet 1 12/13/2022    [facility] [address]     Sig: Take 2 tablets (50 mg) by mouth daily    Class: E-Prescribe    Route: Oral    escitalopram (LEXAPRO) 20 MG tablet    11/28/2022        Sig: Take 20 mg by mouth daily    Class: Historical    Route: Oral    folic acid (FOLVITE) 1 MG tablet    2/26/2020    [facility] [address] [place] [address]    Sig: Take 1 mg by mouth every morning    Class: Historical    Route: Oral    gabapentin (NEURONTIN) 100 MG capsule  180 capsule 2 1/13/2023    [facility] [address]     Sig: Take 2 capsules (200 mg) by mouth 3 times daily    Class: E-Prescribe    Route: Oral    insulin aspart (NOVOLOG VIAL) 100 UNITS/ML vial    11/16/2022        Sig: Inject 1-10 Units Subcutaneous 4 times daily (with meals and nightly)    Class: No Print Out    Notes to Pharmacy: 0.5 unit(s) per 14g cho AC breakfast/lunch AND 0.5 unit(s) per 20 g cho for dinner AND NO meal insulin after 2000 hrs.   Snack coverage:   0800 - 1700 - 0.5 unit(s) per 14 g cho and 1701 - 2000 0.5 unit(s) per 20 g cho    Route: Subcutaneous    insulin glargine (LANTUS PEN) 100 UNIT/ML pen    12/6/2022        Sig: Inject 11 Units Subcutaneous every morning (before breakfast)    Class: No Print Out    Notes to Pharmacy: If Lantus is not covered by insurance, may substitute Basaglar or Semglee or other insulin glargine product per insurance preference at same dose and frequency.      Route: Subcutaneous    insulin pen needle (BD GRISEL U/F) 32G X 4 MM miscellaneous    6/1/2021        Sig: Inject 1 each Subcutaneous    Class: Historical    Route: Subcutaneous    levothyroxine (SYNTHROID/LEVOTHROID) 150 MCG tablet    5/23/2022        Sig: Take 1 tablet by mouth daily    Class: Historical    Route: Oral    LORazepam (ATIVAN) 0.5 MG tablet    11/28/2022        Sig: Take 0.5 mg by mouth nightly as needed For anxiety    Class: Historical    Route: Oral    melatonin 5 MG tablet            Sig: Take 5 mg by mouth At Bedtime    Class: Historical    Route: Oral    multivitamin CF FORMULA (DEKAS PLUS) capsule  90 capsule 3 3/1/2022    [id number] [place] [facility] [address] [facility]    Sig: Take 1 capsule by mouth daily    Class: E-Prescribe    Route: Oral    mycophenolate (GENERIC EQUIVALENT) 250 MG capsule  540 capsule 3 12/29/2022    [facility] [place], [place] - [address]    Sig: Take 2 capsules (500 mg) by mouth two times daily    Class: E-Prescribe    Route: Oral    ondansetron (ZOFRAN ODT) 8 MG ODT tab  30 tablet 0 12/6/2022    [facility] [place], [place] - [address]    Sig: Take 1 tablet (8 mg) by mouth every 8 hours as needed for nausea    Class: E-Prescribe    Route: Oral    polyethylene glycol (MIRALAX) 17 GM/Dose powder  510 g  11/10/2022        Sig: Take 17 g by mouth daily    Class: Transitional    Route: Oral    prochlorperazine (COMPAZINE) 10 MG tablet  10  tablet 0 12/15/2022    [facility] [id number] [place] [facility] - [place], [place] - [address] [facility]    Sig: Take 0.5 tablets (5 mg) by mouth every 8 hours as needed for nausea or vomiting    Class: E-Prescribe    Route: Oral    rizatriptan (MAXALT) 10 MG tablet    11/28/2022        Sig: Take 10 mg by mouth as needed Take 1 Tablet (10 mg) by mouth as needed for Headache. at onset of migraine. May repeat in 2 hr if needed up to 30mg in 24 hr & MAX use 5 days/month. Do not use within 24 hours of an ergotamine preparation or a different triptan.    Class: Historical    Route: Oral    sitagliptin (JANUVIA) 100 MG tablet  30 tablet 0 11/17/2022    [facility] [place], [place] - [address]    Sig: Take 1 tablet (100 mg) by mouth daily    Class: E-Prescribe    Route: Oral    tacrolimus (GENERIC EQUIVALENT) 0.5 MG capsule  90 capsule 3 1/13/2023    [facility] [place], [place] - [address]    Sig: Take 1 capsule (0.5 mg) by mouth as needed (For dose changes) Total dose 6 mg AM, 5 mg PM    Class: E-Prescribe    Notes to Pharmacy: TXP DT 11/2/2022 (Liver) TXP Dischg DT 11/10/2022 DX Liver replaced by transplant Z94.4 TX [facility] ([place], [place])    Route: Oral    tacrolimus (GENERIC EQUIVALENT) 1 MG capsule  90 capsule 3 1/13/2023    [facility] [place], [place] - [address]    Sig: Take 1 capsule (1 mg) by mouth every morning Total dose 6 mg AM, 5 mg PM    Class: E-Prescribe    Notes to Pharmacy: TXP DT 11/2/2022 (Liver) TXP Dischg DT 11/10/2022 DX Liver replaced by transplant Z94.4 TX [facility] ([place], [place])    Route: Oral    tacrolimus (GENERIC EQUIVALENT) 5 MG capsule  90 capsule 3 1/13/2023    [facility] [place], [place] - [address]    Sig: Take 1 capsule (5 mg) by mouth 2 times daily Total dose 6 mg AM, 5 mg PM    Class:  E-Prescribe    Notes to Pharmacy: TXP DT 11/2/2022 (Liver) TXP Dischg DT 11/10/2022 DX Liver replaced by transplant Z94.4 [facility] ([place], [place])    Route: Oral    topiramate (TOPAMAX) 25 MG tablet  90 tablet 2 12/6/2022    [facility] - [place], [place] - [address] [facility]    Sig: Take 3 tablets (75 mg) by mouth At Bedtime    Class: E-Prescribe    Route: Oral    Renewals     Renewal requests to authorizing provider (Bhavana Romo, NP) <b>prohibited</b>          traZODone (DESYREL) 50 MG tablet    11/28/2022        Sig: Take 50 mg by mouth At Bedtime    Class: Historical    Route: Oral    valGANciclovir (VALCYTE) 450 MG tablet  90 tablet 3 1/2/2023        Sig: Take 1 tablet (450 mg) by mouth daily    Class: Historical    Notes to Pharmacy: HOLD as of 1/25 due to low WBC (low risk for CMV)    Route: Oral        Methylprednisolone, Adhesive tape, Oxycodone, Droperidol, Nsaids, and Prednisone   REVIEW OF SYSTEMS (check box if normal)  [x]               GENERAL  [x]                 PULMONARY [x]                GENITOURINARY  [x]                CNS                 [x]                 CARDIAC  [x]                 ENDOCRINE  [x]                EARS,NOSE,THROAT [x]                 GASTROINTESTINAL [x]                 NEUROLOGIC    [x]                MUSCLOSKELTAL  [x]                  HEMATOLOGY      PHYSICAL EXAM (check box if normal)There were no vitals taken for this visit.        [x]            GENERAL:    [x]       EYES:  ICTERIC   []        YES  []                    NO  [x]           EXTREMITIES: Clubbing []       Y     []           N    []           EARS, NOSE, THROAT: Membranes Moist    YES   []                   NO []                  []           LUNGS:  CLEAR    YES       []                  NO    []                                [x]           SKIN: Jaundice           YES       []                  NO    []                    Rash: YES       []                  NO    [x]                                     []             HEART: Regular Rate          YES       []                  NO    []                   Incision Clean:  YES       []                  NO    []                                []                    ABDOMEN: Organomegaly YES       []                  NO    []                       [x]                    NEUROLOGICAL:  Nonfocal  YES       [x]                  NO    []                       []                    Hernia YES       []                  NO    []                   PSYCHIATRIC:  Appropriate  YES       []                  NO    []                       OTHER:                                                                                                   PAIN SCALE:: 2

## 2023-01-16 ENCOUNTER — LAB (OUTPATIENT)
Dept: LAB | Facility: CLINIC | Age: 53
End: 2023-01-16
Payer: COMMERCIAL

## 2023-01-16 ENCOUNTER — TELEPHONE (OUTPATIENT)
Dept: TRANSPLANT | Facility: CLINIC | Age: 53
End: 2023-01-16

## 2023-01-16 DIAGNOSIS — D72.819 LEUKOPENIA, UNSPECIFIED TYPE: ICD-10-CM

## 2023-01-16 DIAGNOSIS — Z94.4 LIVER REPLACED BY TRANSPLANT (H): Primary | ICD-10-CM

## 2023-01-16 DIAGNOSIS — Z94.4 LIVER REPLACED BY TRANSPLANT (H): ICD-10-CM

## 2023-01-16 LAB
ALBUMIN SERPL-MCNC: 2.8 G/DL (ref 3.4–5)
ALP SERPL-CCNC: 70 U/L (ref 40–150)
ALT SERPL W P-5'-P-CCNC: 14 U/L (ref 0–50)
ANION GAP SERPL CALCULATED.3IONS-SCNC: 3 MMOL/L (ref 3–14)
AST SERPL W P-5'-P-CCNC: 13 U/L (ref 0–45)
BASOPHILS # BLD AUTO: 0 10E3/UL (ref 0–0.2)
BASOPHILS NFR BLD AUTO: 1 %
BILIRUB DIRECT SERPL-MCNC: 0.1 MG/DL (ref 0–0.2)
BILIRUB SERPL-MCNC: 0.3 MG/DL (ref 0.2–1.3)
BUN SERPL-MCNC: 17 MG/DL (ref 7–30)
CALCIUM SERPL-MCNC: 8.4 MG/DL (ref 8.5–10.1)
CHLORIDE BLD-SCNC: 115 MMOL/L (ref 94–109)
CO2 SERPL-SCNC: 26 MMOL/L (ref 20–32)
CREAT SERPL-MCNC: 0.95 MG/DL (ref 0.52–1.04)
EOSINOPHIL # BLD AUTO: 0.1 10E3/UL (ref 0–0.7)
EOSINOPHIL NFR BLD AUTO: 4 %
ERYTHROCYTE [DISTWIDTH] IN BLOOD BY AUTOMATED COUNT: 13.4 % (ref 10–15)
GFR SERPL CREATININE-BSD FRML MDRD: 73 ML/MIN/1.73M2
GLUCOSE BLD-MCNC: 158 MG/DL (ref 70–99)
HCT VFR BLD AUTO: 30.6 % (ref 35–47)
HGB BLD-MCNC: 9.2 G/DL (ref 11.7–15.7)
IMM GRANULOCYTES # BLD: 0 10E3/UL
IMM GRANULOCYTES NFR BLD: 2 %
LYMPHOCYTES # BLD AUTO: 0.5 10E3/UL (ref 0.8–5.3)
LYMPHOCYTES NFR BLD AUTO: 27 %
MAGNESIUM SERPL-MCNC: 1.7 MG/DL (ref 1.6–2.3)
MCH RBC QN AUTO: 29.5 PG (ref 26.5–33)
MCHC RBC AUTO-ENTMCNC: 30.1 G/DL (ref 31.5–36.5)
MCV RBC AUTO: 98 FL (ref 78–100)
MONOCYTES # BLD AUTO: 0.2 10E3/UL (ref 0–1.3)
MONOCYTES NFR BLD AUTO: 11 %
NEUTROPHILS # BLD AUTO: 1.1 10E3/UL (ref 1.6–8.3)
NEUTROPHILS NFR BLD AUTO: 55 %
PHOSPHATE SERPL-MCNC: 3.8 MG/DL (ref 2.5–4.5)
PLATELET # BLD AUTO: 155 10E3/UL (ref 150–450)
POTASSIUM BLD-SCNC: 5.3 MMOL/L (ref 3.4–5.3)
PROT SERPL-MCNC: 5.8 G/DL (ref 6.8–8.8)
RBC # BLD AUTO: 3.12 10E6/UL (ref 3.8–5.2)
SODIUM SERPL-SCNC: 144 MMOL/L (ref 133–144)
TACROLIMUS BLD-MCNC: 8.4 UG/L (ref 5–15)
TME LAST DOSE: NORMAL H
TME LAST DOSE: NORMAL H
WBC # BLD AUTO: 2 10E3/UL (ref 4–11)
WBC # BLD AUTO: ABNORMAL 10*3/UL

## 2023-01-16 PROCEDURE — 85025 COMPLETE CBC W/AUTO DIFF WBC: CPT

## 2023-01-16 PROCEDURE — 80053 COMPREHEN METABOLIC PANEL: CPT

## 2023-01-16 PROCEDURE — 80197 ASSAY OF TACROLIMUS: CPT

## 2023-01-16 PROCEDURE — 82248 BILIRUBIN DIRECT: CPT

## 2023-01-16 PROCEDURE — 84100 ASSAY OF PHOSPHORUS: CPT

## 2023-01-16 PROCEDURE — 83735 ASSAY OF MAGNESIUM: CPT

## 2023-01-16 PROCEDURE — 36415 COLL VENOUS BLD VENIPUNCTURE: CPT

## 2023-01-16 NOTE — TELEPHONE ENCOUNTER
"Spoke to Susan. Reporting right side abdominal pain worsening since Saturday. Reports pain is constant. Denies urinary/bowel issues. States she has been eating/drinking as much as she can. Using Zofran PRN for nausea. PCP prescribed Hydrocodone, states it has been \"taking the edge off\" but hasn't been relieving her pain. Denies fevers, SOB, chest pain. BP today 130's systolic, HR- states it was not over 100. Did get CT abdomen through [facility] on Saturday 1/14. Will send results to Dr Morgan to review/advise. Encouraged patient to call PCP to see if they have any further ideas. Reviewed labs, all WNL. Instructed patient to present to ER if new/worsening symptoms- SOB, CP, fevers.      "

## 2023-01-17 ENCOUNTER — HOSPITAL ENCOUNTER (OUTPATIENT)
Facility: CLINIC | Age: 53
Setting detail: OBSERVATION
Discharge: HOME | End: 2023-01-19
Attending: EMERGENCY MEDICINE | Admitting: TRANSPLANT SURGERY
Payer: COMMERCIAL

## 2023-01-17 ENCOUNTER — APPOINTMENT (OUTPATIENT)
Dept: ULTRASOUND IMAGING | Facility: CLINIC | Age: 53
End: 2023-01-17
Attending: EMERGENCY MEDICINE
Payer: COMMERCIAL

## 2023-01-17 ENCOUNTER — TELEPHONE (OUTPATIENT)
Dept: TRANSPLANT | Facility: CLINIC | Age: 53
End: 2023-01-17
Payer: COMMERCIAL

## 2023-01-17 DIAGNOSIS — Z94.4 LIVER REPLACED BY TRANSPLANT (H): Primary | ICD-10-CM

## 2023-01-17 DIAGNOSIS — R10.11 RUQ ABDOMINAL PAIN: ICD-10-CM

## 2023-01-17 DIAGNOSIS — Z94.4 STATUS POST LIVER TRANSPLANTATION (H): Chronic | ICD-10-CM

## 2023-01-17 DIAGNOSIS — R10.84 GENERALIZED ABDOMINAL PAIN: Primary | ICD-10-CM

## 2023-01-17 DIAGNOSIS — Z94.4 LIVER REPLACED BY TRANSPLANT (H): ICD-10-CM

## 2023-01-17 LAB
ALBUMIN SERPL BCG-MCNC: 3 G/DL (ref 3.5–5.2)
ALBUMIN UR-MCNC: NEGATIVE MG/DL
ALP SERPL-CCNC: 66 U/L (ref 35–104)
ALT SERPL W P-5'-P-CCNC: 7 U/L (ref 10–35)
ANION GAP SERPL CALCULATED.3IONS-SCNC: 7 MMOL/L (ref 7–15)
APPEARANCE UR: CLEAR
AST SERPL W P-5'-P-CCNC: 21 U/L (ref 10–35)
BASOPHILS # BLD AUTO: 0 10E3/UL (ref 0–0.2)
BASOPHILS NFR BLD AUTO: 1 %
BILIRUB SERPL-MCNC: 0.2 MG/DL
BILIRUB UR QL STRIP: NEGATIVE
BUN SERPL-MCNC: 18.6 MG/DL (ref 6–20)
CALCIUM SERPL-MCNC: 8.2 MG/DL (ref 8.6–10)
CHLORIDE SERPL-SCNC: 111 MMOL/L (ref 98–107)
COLOR UR AUTO: ABNORMAL
CREAT SERPL-MCNC: 1.18 MG/DL (ref 0.51–0.95)
DEPRECATED HCO3 PLAS-SCNC: 20 MMOL/L (ref 22–29)
EOSINOPHIL # BLD AUTO: 0.1 10E3/UL (ref 0–0.7)
EOSINOPHIL NFR BLD AUTO: 3 %
ERYTHROCYTE [DISTWIDTH] IN BLOOD BY AUTOMATED COUNT: 13.5 % (ref 10–15)
GFR SERPL CREATININE-BSD FRML MDRD: 56 ML/MIN/1.73M2
GLUCOSE SERPL-MCNC: 74 MG/DL (ref 70–99)
GLUCOSE UR STRIP-MCNC: NEGATIVE MG/DL
HCT VFR BLD AUTO: 28.6 % (ref 35–47)
HGB BLD-MCNC: 8.5 G/DL (ref 11.7–15.7)
HGB UR QL STRIP: NEGATIVE
HOLD SPECIMEN: NORMAL
HYALINE CASTS: 11 /LPF
IMM GRANULOCYTES # BLD: 0.1 10E3/UL
IMM GRANULOCYTES NFR BLD: 3 %
KETONES UR STRIP-MCNC: NEGATIVE MG/DL
LEUKOCYTE ESTERASE UR QL STRIP: NEGATIVE
LIPASE SERPL-CCNC: 7 U/L (ref 13–60)
LYMPHOCYTES # BLD AUTO: 0.8 10E3/UL (ref 0.8–5.3)
LYMPHOCYTES NFR BLD AUTO: 43 %
MCH RBC QN AUTO: 29.5 PG (ref 26.5–33)
MCHC RBC AUTO-ENTMCNC: 29.7 G/DL (ref 31.5–36.5)
MCV RBC AUTO: 99 FL (ref 78–100)
MONOCYTES # BLD AUTO: 0.3 10E3/UL (ref 0–1.3)
MONOCYTES NFR BLD AUTO: 14 %
NEUTROPHILS # BLD AUTO: 0.7 10E3/UL (ref 1.6–8.3)
NEUTROPHILS NFR BLD AUTO: 36 %
NITRATE UR QL: NEGATIVE
NRBC # BLD AUTO: 0 10E3/UL
NRBC BLD AUTO-RTO: 0 /100
PH UR STRIP: 5.5 [PH] (ref 5–7)
PLATELET # BLD AUTO: 142 10E3/UL (ref 150–450)
POTASSIUM SERPL-SCNC: 5.7 MMOL/L (ref 3.4–5.3)
PROT SERPL-MCNC: 5.2 G/DL (ref 6.4–8.3)
RBC # BLD AUTO: 2.88 10E6/UL (ref 3.8–5.2)
RBC URINE: 1 /HPF
SODIUM SERPL-SCNC: 138 MMOL/L (ref 136–145)
SP GR UR STRIP: 1.02 (ref 1–1.03)
SQUAMOUS EPITHELIAL: 1 /HPF
UROBILINOGEN UR STRIP-MCNC: NORMAL MG/DL
WBC # BLD AUTO: 1.8 10E3/UL (ref 4–11)
WBC URINE: <1 /HPF

## 2023-01-17 PROCEDURE — 80053 COMPREHEN METABOLIC PANEL: CPT | Performed by: EMERGENCY MEDICINE

## 2023-01-17 PROCEDURE — 36415 COLL VENOUS BLD VENIPUNCTURE: CPT | Performed by: EMERGENCY MEDICINE

## 2023-01-17 PROCEDURE — 85025 COMPLETE CBC W/AUTO DIFF WBC: CPT | Performed by: EMERGENCY MEDICINE

## 2023-01-17 PROCEDURE — 96375 TX/PRO/DX INJ NEW DRUG ADDON: CPT | Performed by: EMERGENCY MEDICINE

## 2023-01-17 PROCEDURE — 96374 THER/PROPH/DIAG INJ IV PUSH: CPT | Performed by: EMERGENCY MEDICINE

## 2023-01-17 PROCEDURE — 81001 URINALYSIS AUTO W/SCOPE: CPT | Performed by: EMERGENCY MEDICINE

## 2023-01-17 PROCEDURE — 120N000002 HC R&B MED SURG/OB UMMC

## 2023-01-17 PROCEDURE — 93975 VASCULAR STUDY: CPT | Mod: 26 | Performed by: RADIOLOGY

## 2023-01-17 PROCEDURE — 83690 ASSAY OF LIPASE: CPT | Performed by: EMERGENCY MEDICINE

## 2023-01-17 PROCEDURE — 84450 TRANSFERASE (AST) (SGOT): CPT

## 2023-01-17 PROCEDURE — 99285 EMERGENCY DEPT VISIT HI MDM: CPT | Mod: 25 | Performed by: EMERGENCY MEDICINE

## 2023-01-17 PROCEDURE — 99285 EMERGENCY DEPT VISIT HI MDM: CPT | Performed by: EMERGENCY MEDICINE

## 2023-01-17 PROCEDURE — 250N000011 HC RX IP 250 OP 636: Performed by: EMERGENCY MEDICINE

## 2023-01-17 PROCEDURE — 93975 VASCULAR STUDY: CPT

## 2023-01-17 RX ORDER — MORPHINE SULFATE 4 MG/ML
4 INJECTION, SOLUTION INTRAMUSCULAR; INTRAVENOUS
Status: DISCONTINUED | OUTPATIENT
Start: 2023-01-17 | End: 2023-01-18

## 2023-01-17 RX ORDER — METOCLOPRAMIDE HYDROCHLORIDE 5 MG/ML
5 INJECTION INTRAMUSCULAR; INTRAVENOUS ONCE
Status: COMPLETED | OUTPATIENT
Start: 2023-01-17 | End: 2023-01-17

## 2023-01-17 RX ORDER — CYCLOBENZAPRINE HCL 10 MG
10 TABLET ORAL EVERY 8 HOURS PRN
Qty: 30 TABLET | Refills: 0 | Status: SHIPPED | OUTPATIENT
Start: 2023-01-17 | End: 2023-02-09

## 2023-01-17 RX ADMIN — METOCLOPRAMIDE 5 MG: 5 INJECTION, SOLUTION INTRAMUSCULAR; INTRAVENOUS at 19:20

## 2023-01-17 RX ADMIN — MORPHINE SULFATE 4 MG: 4 INJECTION INTRAVENOUS at 19:21

## 2023-01-17 ASSESSMENT — ACTIVITIES OF DAILY LIVING (ADL)
ADLS_ACUITY_SCORE: 39
ADLS_ACUITY_SCORE: 41
ADLS_ACUITY_SCORE: 41

## 2023-01-17 NOTE — ED TRIAGE NOTES
Pt presents with a 4 day history of lower right abdominal pain radiating to her back.  Had CT Saturday AM at [facility] and had labs drawn yesterday at [facility].  PT is 76 days s/p liver transplant.       Triage Assessment     Row Name 01/17/23 [id number]       Triage Assessment (Adult)    Airway WDL WDL       Respiratory WDL    Respiratory WDL WDL       Skin Circulation/Temperature WDL    Skin Circulation/Temperature WDL WDL       Cardiac WDL    Cardiac WDL WDL       Peripheral/Neurovascular WDL    Peripheral Neurovascular WDL WDL       Cognitive/Neuro/Behavioral WDL    Cognitive/Neuro/Behavioral WDL WDL

## 2023-01-18 ENCOUNTER — APPOINTMENT (OUTPATIENT)
Dept: CT IMAGING | Facility: CLINIC | Age: 53
End: 2023-01-18
Attending: EMERGENCY MEDICINE
Payer: COMMERCIAL

## 2023-01-18 LAB
ALBUMIN SERPL BCG-MCNC: 2.6 G/DL (ref 3.5–5.2)
ALBUMIN SERPL BCG-MCNC: 3 G/DL (ref 3.5–5.2)
ALP SERPL-CCNC: 60 U/L (ref 35–104)
ALP SERPL-CCNC: 64 U/L (ref 35–104)
ALT SERPL W P-5'-P-CCNC: 8 U/L (ref 10–35)
ALT SERPL W P-5'-P-CCNC: <5 U/L (ref 10–35)
ANION GAP SERPL CALCULATED.3IONS-SCNC: 6 MMOL/L (ref 7–15)
ANION GAP SERPL CALCULATED.3IONS-SCNC: 7 MMOL/L (ref 7–15)
AST SERPL W P-5'-P-CCNC: 20 U/L (ref 10–35)
AST SERPL W P-5'-P-CCNC: 21 U/L (ref 10–35)
ATRIAL RATE - MUSE: 55 BPM
BASOPHILS # BLD MANUAL: 0 10E3/UL (ref 0–0.2)
BASOPHILS NFR BLD MANUAL: 1 %
BILIRUB DIRECT SERPL-MCNC: <0.2 MG/DL (ref 0–0.3)
BILIRUB SERPL-MCNC: 0.2 MG/DL
BILIRUB SERPL-MCNC: 0.2 MG/DL
BUN SERPL-MCNC: 15.1 MG/DL (ref 6–20)
BUN SERPL-MCNC: 18.7 MG/DL (ref 6–20)
CALCIUM SERPL-MCNC: 7.8 MG/DL (ref 8.6–10)
CALCIUM SERPL-MCNC: 8 MG/DL (ref 8.6–10)
CHLORIDE SERPL-SCNC: 109 MMOL/L (ref 98–107)
CHLORIDE SERPL-SCNC: 114 MMOL/L (ref 98–107)
CREAT SERPL-MCNC: 0.96 MG/DL (ref 0.51–0.95)
CREAT SERPL-MCNC: 1.16 MG/DL (ref 0.51–0.95)
DEPRECATED HCO3 PLAS-SCNC: 18 MMOL/L (ref 22–29)
DEPRECATED HCO3 PLAS-SCNC: 20 MMOL/L (ref 22–29)
DIASTOLIC BLOOD PRESSURE - MUSE: NORMAL MMHG
EOSINOPHIL # BLD MANUAL: 0.1 10E3/UL (ref 0–0.7)
EOSINOPHIL NFR BLD MANUAL: 4 %
ERYTHROCYTE [DISTWIDTH] IN BLOOD BY AUTOMATED COUNT: 13.2 % (ref 10–15)
GFR SERPL CREATININE-BSD FRML MDRD: 57 ML/MIN/1.73M2
GFR SERPL CREATININE-BSD FRML MDRD: 72 ML/MIN/1.73M2
GLUCOSE BLDC GLUCOMTR-MCNC: 109 MG/DL (ref 70–99)
GLUCOSE BLDC GLUCOMTR-MCNC: 113 MG/DL (ref 70–99)
GLUCOSE BLDC GLUCOMTR-MCNC: 124 MG/DL (ref 70–99)
GLUCOSE BLDC GLUCOMTR-MCNC: 137 MG/DL (ref 70–99)
GLUCOSE BLDC GLUCOMTR-MCNC: 154 MG/DL (ref 70–99)
GLUCOSE BLDC GLUCOMTR-MCNC: 72 MG/DL (ref 70–99)
GLUCOSE BLDC GLUCOMTR-MCNC: 73 MG/DL (ref 70–99)
GLUCOSE SERPL-MCNC: 76 MG/DL (ref 70–99)
GLUCOSE SERPL-MCNC: 76 MG/DL (ref 70–99)
HCT VFR BLD AUTO: 29.7 % (ref 35–47)
HGB BLD-MCNC: 8.7 G/DL (ref 11.7–15.7)
INTERPRETATION ECG - MUSE: NORMAL
LYMPHOCYTES # BLD MANUAL: 0.5 10E3/UL (ref 0.8–5.3)
LYMPHOCYTES NFR BLD MANUAL: 34 %
MAGNESIUM SERPL-MCNC: 1.6 MG/DL (ref 1.7–2.3)
MCH RBC QN AUTO: 29.1 PG (ref 26.5–33)
MCHC RBC AUTO-ENTMCNC: 29.3 G/DL (ref 31.5–36.5)
MCV RBC AUTO: 99 FL (ref 78–100)
METAMYELOCYTES # BLD MANUAL: 0.1 10E3/UL
METAMYELOCYTES NFR BLD MANUAL: 9 %
MONOCYTES # BLD MANUAL: 0.1 10E3/UL (ref 0–1.3)
MONOCYTES NFR BLD MANUAL: 10 %
MYELOCYTES # BLD MANUAL: 0.2 10E3/UL
MYELOCYTES NFR BLD MANUAL: 16 %
NEUTROPHILS # BLD MANUAL: 0.4 10E3/UL (ref 1.6–8.3)
NEUTROPHILS NFR BLD MANUAL: 26 %
P AXIS - MUSE: 36 DEGREES
PATH REV: ABNORMAL
PHOSPHATE SERPL-MCNC: 4.2 MG/DL (ref 2.5–4.5)
PLAT MORPH BLD: ABNORMAL
PLATELET # BLD AUTO: 121 10E3/UL (ref 150–450)
POTASSIUM SERPL-SCNC: 5.2 MMOL/L (ref 3.4–5.3)
POTASSIUM SERPL-SCNC: 5.3 MMOL/L (ref 3.4–5.3)
POTASSIUM SERPL-SCNC: 5.6 MMOL/L (ref 3.4–5.3)
PR INTERVAL - MUSE: 134 MS
PROT SERPL-MCNC: 4.7 G/DL (ref 6.4–8.3)
PROT SERPL-MCNC: 5.1 G/DL (ref 6.4–8.3)
QRS DURATION - MUSE: 66 MS
QT - MUSE: 446 MS
QTC - MUSE: 426 MS
R AXIS - MUSE: -1 DEGREES
RBC # BLD AUTO: 2.99 10E6/UL (ref 3.8–5.2)
RBC MORPH BLD: ABNORMAL
SARS-COV-2 RNA RESP QL NAA+PROBE: NEGATIVE
SODIUM SERPL-SCNC: 135 MMOL/L (ref 136–145)
SODIUM SERPL-SCNC: 139 MMOL/L (ref 136–145)
SYSTOLIC BLOOD PRESSURE - MUSE: NORMAL MMHG
T AXIS - MUSE: -5 DEGREES
TACROLIMUS BLD-MCNC: 5.9 UG/L (ref 5–15)
TME LAST DOSE: NORMAL H
TME LAST DOSE: NORMAL H
VENTRICULAR RATE- MUSE: 55 BPM
WBC # BLD AUTO: 1.4 10E3/UL (ref 4–11)

## 2023-01-18 PROCEDURE — G0378 HOSPITAL OBSERVATION PER HR: HCPCS

## 2023-01-18 PROCEDURE — 250N000013 HC RX MED GY IP 250 OP 250 PS 637: Performed by: PHYSICIAN ASSISTANT

## 2023-01-18 PROCEDURE — 250N000013 HC RX MED GY IP 250 OP 250 PS 637

## 2023-01-18 PROCEDURE — 80053 COMPREHEN METABOLIC PANEL: CPT | Performed by: EMERGENCY MEDICINE

## 2023-01-18 PROCEDURE — 82962 GLUCOSE BLOOD TEST: CPT

## 2023-01-18 PROCEDURE — 250N000012 HC RX MED GY IP 250 OP 636 PS 637

## 2023-01-18 PROCEDURE — 36415 COLL VENOUS BLD VENIPUNCTURE: CPT | Performed by: EMERGENCY MEDICINE

## 2023-01-18 PROCEDURE — 82248 BILIRUBIN DIRECT: CPT

## 2023-01-18 PROCEDURE — 74176 CT ABD & PELVIS W/O CONTRAST: CPT

## 2023-01-18 PROCEDURE — 85041 AUTOMATED RBC COUNT: CPT

## 2023-01-18 PROCEDURE — U0003 INFECTIOUS AGENT DETECTION BY NUCLEIC ACID (DNA OR RNA); SEVERE ACUTE RESPIRATORY SYNDROME CORONAVIRUS 2 (SARS-COV-2) (CORONAVIRUS DISEASE [COVID-19]), AMPLIFIED PROBE TECHNIQUE, MAKING USE OF HIGH THROUGHPUT TECHNOLOGIES AS DESCRIBED BY CMS-2020-01-R: HCPCS

## 2023-01-18 PROCEDURE — 84100 ASSAY OF PHOSPHORUS: CPT

## 2023-01-18 PROCEDURE — 93005 ELECTROCARDIOGRAM TRACING: CPT | Performed by: EMERGENCY MEDICINE

## 2023-01-18 PROCEDURE — 74176 CT ABD & PELVIS W/O CONTRAST: CPT | Mod: 26 | Performed by: RADIOLOGY

## 2023-01-18 PROCEDURE — 258N000003 HC RX IP 258 OP 636: Performed by: EMERGENCY MEDICINE

## 2023-01-18 PROCEDURE — 250N000013 HC RX MED GY IP 250 OP 250 PS 637: Performed by: SURGERY

## 2023-01-18 PROCEDURE — 36415 COLL VENOUS BLD VENIPUNCTURE: CPT

## 2023-01-18 PROCEDURE — 250N000011 HC RX IP 250 OP 636: Performed by: EMERGENCY MEDICINE

## 2023-01-18 PROCEDURE — 250N000011 HC RX IP 250 OP 636

## 2023-01-18 PROCEDURE — 84132 ASSAY OF SERUM POTASSIUM: CPT

## 2023-01-18 PROCEDURE — 999N000248 HC STATISTIC IV INSERT WITH US BY RN

## 2023-01-18 PROCEDURE — 80197 ASSAY OF TACROLIMUS: CPT

## 2023-01-18 PROCEDURE — 85007 BL SMEAR W/DIFF WBC COUNT: CPT | Performed by: PHYSICIAN ASSISTANT

## 2023-01-18 PROCEDURE — 99024 POSTOP FOLLOW-UP VISIT: CPT | Performed by: TRANSPLANT SURGERY

## 2023-01-18 PROCEDURE — 83735 ASSAY OF MAGNESIUM: CPT

## 2023-01-18 PROCEDURE — 93010 ELECTROCARDIOGRAM REPORT: CPT | Performed by: EMERGENCY MEDICINE

## 2023-01-18 RX ORDER — POLYETHYLENE GLYCOL 3350 17 G/17G
34 POWDER, FOR SOLUTION ORAL 2 TIMES DAILY
Status: DISCONTINUED | OUTPATIENT
Start: 2023-01-18 | End: 2023-01-19 | Stop reason: HOSPADM

## 2023-01-18 RX ORDER — POLYETHYLENE GLYCOL 3350 17 G/17G
17 POWDER, FOR SOLUTION ORAL DAILY
Status: DISCONTINUED | OUTPATIENT
Start: 2023-01-18 | End: 2023-01-18

## 2023-01-18 RX ORDER — PROCHLORPERAZINE MALEATE 10 MG
10 TABLET ORAL EVERY 6 HOURS PRN
Status: DISCONTINUED | OUTPATIENT
Start: 2023-01-18 | End: 2023-01-19 | Stop reason: HOSPADM

## 2023-01-18 RX ORDER — TACROLIMUS 5 MG/1
5 CAPSULE ORAL EVERY EVENING
Status: DISCONTINUED | OUTPATIENT
Start: 2023-01-18 | End: 2023-01-19 | Stop reason: HOSPADM

## 2023-01-18 RX ORDER — VALGANCICLOVIR 450 MG/1
450 TABLET, FILM COATED ORAL DAILY
Status: DISCONTINUED | OUTPATIENT
Start: 2023-01-18 | End: 2023-01-19 | Stop reason: HOSPADM

## 2023-01-18 RX ORDER — DOCUSATE SODIUM 100 MG/1
100 CAPSULE, LIQUID FILLED ORAL 2 TIMES DAILY
Qty: 60 CAPSULE | Refills: 1 | Status: SHIPPED | OUTPATIENT
Start: 2023-01-18 | End: 2023-01-19

## 2023-01-18 RX ORDER — ACETAMINOPHEN 500 MG
500 TABLET ORAL EVERY 4 HOURS PRN
Status: DISCONTINUED | OUTPATIENT
Start: 2023-01-18 | End: 2023-01-19 | Stop reason: HOSPADM

## 2023-01-18 RX ORDER — CALCIUM GLUCONATE 94 MG/ML
1 INJECTION, SOLUTION INTRAVENOUS ONCE
Status: COMPLETED | OUTPATIENT
Start: 2023-01-18 | End: 2023-01-18

## 2023-01-18 RX ORDER — SODIUM CHLORIDE 9 MG/ML
INJECTION, SOLUTION INTRAVENOUS CONTINUOUS
Status: DISCONTINUED | OUTPATIENT
Start: 2023-01-18 | End: 2023-01-18

## 2023-01-18 RX ORDER — MYCOPHENOLATE MOFETIL 250 MG/1
500 CAPSULE ORAL
Status: DISCONTINUED | OUTPATIENT
Start: 2023-01-18 | End: 2023-01-19 | Stop reason: HOSPADM

## 2023-01-18 RX ORDER — DAPSONE 25 MG/1
50 TABLET ORAL DAILY
Status: DISCONTINUED | OUTPATIENT
Start: 2023-01-18 | End: 2023-01-19 | Stop reason: HOSPADM

## 2023-01-18 RX ORDER — DOCUSATE SODIUM 100 MG/1
100 CAPSULE, LIQUID FILLED ORAL 2 TIMES DAILY
Status: DISCONTINUED | OUTPATIENT
Start: 2023-01-18 | End: 2023-01-19 | Stop reason: HOSPADM

## 2023-01-18 RX ORDER — ONDANSETRON 4 MG/1
4 TABLET, ORALLY DISINTEGRATING ORAL EVERY 6 HOURS PRN
Status: DISCONTINUED | OUTPATIENT
Start: 2023-01-18 | End: 2023-01-19 | Stop reason: HOSPADM

## 2023-01-18 RX ORDER — ONDANSETRON 2 MG/ML
4 INJECTION INTRAMUSCULAR; INTRAVENOUS EVERY 6 HOURS PRN
Status: DISCONTINUED | OUTPATIENT
Start: 2023-01-18 | End: 2023-01-19 | Stop reason: HOSPADM

## 2023-01-18 RX ORDER — TACROLIMUS 1 MG/1
1 CAPSULE ORAL EVERY MORNING
Status: DISCONTINUED | OUTPATIENT
Start: 2023-01-18 | End: 2023-01-18 | Stop reason: DRUGHIGH

## 2023-01-18 RX ORDER — LIDOCAINE 40 MG/G
CREAM TOPICAL
Status: DISCONTINUED | OUTPATIENT
Start: 2023-01-18 | End: 2023-01-19 | Stop reason: HOSPADM

## 2023-01-18 RX ORDER — CYCLOBENZAPRINE HCL 5 MG
10 TABLET ORAL EVERY 8 HOURS PRN
Status: DISCONTINUED | OUTPATIENT
Start: 2023-01-18 | End: 2023-01-19 | Stop reason: HOSPADM

## 2023-01-18 RX ORDER — SENNOSIDES 8.6 MG
8.6 TABLET ORAL 2 TIMES DAILY
Status: DISCONTINUED | OUTPATIENT
Start: 2023-01-18 | End: 2023-01-19 | Stop reason: HOSPADM

## 2023-01-18 RX ORDER — SODIUM CHLORIDE 9 MG/ML
INJECTION, SOLUTION INTRAVENOUS CONTINUOUS
Status: DISCONTINUED | OUTPATIENT
Start: 2023-01-18 | End: 2023-01-19 | Stop reason: HOSPADM

## 2023-01-18 RX ORDER — MORPHINE SULFATE 4 MG/ML
4 INJECTION, SOLUTION INTRAMUSCULAR; INTRAVENOUS
Status: DISCONTINUED | OUTPATIENT
Start: 2023-01-18 | End: 2023-01-18

## 2023-01-18 RX ORDER — NICOTINE POLACRILEX 4 MG
15-30 LOZENGE BUCCAL
Status: DISCONTINUED | OUTPATIENT
Start: 2023-01-18 | End: 2023-01-19 | Stop reason: HOSPADM

## 2023-01-18 RX ORDER — POLYETHYLENE GLYCOL 3350 17 G/17G
238 POWDER, FOR SOLUTION ORAL ONCE
Status: COMPLETED | OUTPATIENT
Start: 2023-01-18 | End: 2023-01-18

## 2023-01-18 RX ORDER — DEXTROSE MONOHYDRATE 25 G/50ML
25-50 INJECTION, SOLUTION INTRAVENOUS
Status: DISCONTINUED | OUTPATIENT
Start: 2023-01-18 | End: 2023-01-19 | Stop reason: HOSPADM

## 2023-01-18 RX ORDER — GABAPENTIN 100 MG/1
200 CAPSULE ORAL 3 TIMES DAILY
Status: DISCONTINUED | OUTPATIENT
Start: 2023-01-18 | End: 2023-01-19 | Stop reason: HOSPADM

## 2023-01-18 RX ORDER — PROCHLORPERAZINE 25 MG
25 SUPPOSITORY, RECTAL RECTAL EVERY 12 HOURS PRN
Status: DISCONTINUED | OUTPATIENT
Start: 2023-01-18 | End: 2023-01-19 | Stop reason: HOSPADM

## 2023-01-18 RX ORDER — SENNOSIDES 8.6 MG
1-2 TABLET ORAL 2 TIMES DAILY
Qty: 60 TABLET | Refills: 1 | Status: SHIPPED | OUTPATIENT
Start: 2023-01-18 | End: 2023-01-19

## 2023-01-18 RX ORDER — ASPIRIN 81 MG/1
81 TABLET, CHEWABLE ORAL DAILY
Status: DISCONTINUED | OUTPATIENT
Start: 2023-01-18 | End: 2023-01-19 | Stop reason: HOSPADM

## 2023-01-18 RX ORDER — LEVOTHYROXINE SODIUM 75 UG/1
150 TABLET ORAL
Status: DISCONTINUED | OUTPATIENT
Start: 2023-01-18 | End: 2023-01-19

## 2023-01-18 RX ADMIN — DOCUSATE SODIUM 100 MG: 100 CAPSULE, LIQUID FILLED ORAL at 19:44

## 2023-01-18 RX ADMIN — MYCOPHENOLATE MOFETIL 500 MG: 250 CAPSULE ORAL at 08:33

## 2023-01-18 RX ADMIN — TACROLIMUS 5 MG: 5 CAPSULE ORAL at 18:19

## 2023-01-18 RX ADMIN — GABAPENTIN 200 MG: 100 CAPSULE ORAL at 15:06

## 2023-01-18 RX ADMIN — ASPIRIN 81 MG: 81 TABLET, CHEWABLE ORAL at 08:33

## 2023-01-18 RX ADMIN — VALGANCICLOVIR HYDROCHLORIDE 450 MG: 450 TABLET ORAL at 08:37

## 2023-01-18 RX ADMIN — POLYETHYLENE GLYCOL 3350 34 G: 17 POWDER, FOR SOLUTION ORAL at 19:44

## 2023-01-18 RX ADMIN — SODIUM CHLORIDE: 9 INJECTION, SOLUTION INTRAVENOUS at 21:08

## 2023-01-18 RX ADMIN — LEVOTHYROXINE SODIUM 150 MCG: 75 TABLET ORAL at 08:32

## 2023-01-18 RX ADMIN — ONDANSETRON HYDROCHLORIDE 4 MG: 2 INJECTION, SOLUTION INTRAMUSCULAR; INTRAVENOUS at 13:02

## 2023-01-18 RX ADMIN — PANCRELIPASE 2 CAPSULE: 24000; 76000; 120000 CAPSULE, DELAYED RELEASE PELLETS ORAL at 18:19

## 2023-01-18 RX ADMIN — TACROLIMUS 6 MG: 5 CAPSULE ORAL at 08:36

## 2023-01-18 RX ADMIN — SENNOSIDES 8.6 MG: 8.6 TABLET, FILM COATED ORAL at 11:21

## 2023-01-18 RX ADMIN — MAGNESIUM HYDROXIDE 30 ML: 400 SUSPENSION ORAL at 16:59

## 2023-01-18 RX ADMIN — SODIUM CHLORIDE: 9 INJECTION, SOLUTION INTRAVENOUS at 04:12

## 2023-01-18 RX ADMIN — POLYETHYLENE GLYCOL 3350 238 G: 17 POWDER, FOR SOLUTION ORAL at 16:59

## 2023-01-18 RX ADMIN — MORPHINE SULFATE 4 MG: 4 INJECTION INTRAVENOUS at 08:33

## 2023-01-18 RX ADMIN — MYCOPHENOLATE MOFETIL 500 MG: 250 CAPSULE ORAL at 18:19

## 2023-01-18 RX ADMIN — PANCRELIPASE 2 CAPSULE: 24000; 76000; 120000 CAPSULE, DELAYED RELEASE PELLETS ORAL at 08:37

## 2023-01-18 RX ADMIN — MORPHINE SULFATE 4 MG: 4 INJECTION INTRAVENOUS at 02:00

## 2023-01-18 RX ADMIN — DOCUSATE SODIUM 100 MG: 100 CAPSULE, LIQUID FILLED ORAL at 11:21

## 2023-01-18 RX ADMIN — SENNOSIDES 8.6 MG: 8.6 TABLET, FILM COATED ORAL at 19:44

## 2023-01-18 RX ADMIN — PANCRELIPASE 2 CAPSULE: 24000; 76000; 120000 CAPSULE, DELAYED RELEASE PELLETS ORAL at 14:39

## 2023-01-18 RX ADMIN — DAPSONE 50 MG: 25 TABLET ORAL at 08:36

## 2023-01-18 RX ADMIN — CYCLOBENZAPRINE HYDROCHLORIDE 10 MG: 5 TABLET, FILM COATED ORAL at 13:10

## 2023-01-18 RX ADMIN — GABAPENTIN 200 MG: 100 CAPSULE ORAL at 08:33

## 2023-01-18 RX ADMIN — ACETAMINOPHEN 500 MG: 500 TABLET ORAL at 13:10

## 2023-01-18 RX ADMIN — SODIUM CHLORIDE 1000 ML: 9 INJECTION, SOLUTION INTRAVENOUS at 02:06

## 2023-01-18 RX ADMIN — GABAPENTIN 200 MG: 100 CAPSULE ORAL at 19:44

## 2023-01-18 RX ADMIN — POLYETHYLENE GLYCOL 3350 17 G: 17 POWDER, FOR SOLUTION ORAL at 08:00

## 2023-01-18 RX ADMIN — CALCIUM GLUCONATE 1 G: 98 INJECTION, SOLUTION INTRAVENOUS at 02:00

## 2023-01-18 ASSESSMENT — ACTIVITIES OF DAILY LIVING (ADL)
ADLS_ACUITY_SCORE: 41

## 2023-01-18 NOTE — H&P
"    Physician Attestation   I saw this patient with the resident and agree with the resident/fellow's findings and plan of care as documented in the note.      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     Will admit her overnight to monitor. Repeat BMP to reassess K. CT Abdomen/pelvis to reassess fluid collection. Reassess in AM.     - Admit to transplant surgery  - CT A/P w/o contrast  - Repeat BMP now then with AM labs  - Home meds continue, Tac level at 6am  - Reassess in AM.       Diet: Regular Diet Adult    DVT Prophylaxis: Low Risk/Ambulatory with no VTE prophylaxis indicated  Rossi Catheter: Not present  Lines: None     Drains: None     Cardiac Monitoring: None  Code Status:   Full    Clinically Significant Risk Factors Present on Admission        # Hyperkalemia: Highest K = 5.7 mmol/L in last 2 days, will monitor as appropriate       # Hypoalbuminemia: Lowest albumin = 3 g/dL at 1/17/2023  6:52 PM, will monitor as appropriate          # Overweight: Estimated body mass index is 25.76 kg/m  as calculated from the following:    Height as of this encounter: 1.689 m (5' 6.5\").    Weight as of this encounter: 73.5 kg (162 lb).           Disposition Plan      Expected Discharge Date: 01/22/2023                 The " patient's care was discussed with the Chief Resident/Fellow.    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     Per the patient and her , she has been facing several weeks of abdominal pain in her right abdomen that wraps to her right flank. She notes over the last few days this has worsened and presented for further evaluation. She has been seen numerous times in the ED for this over the past several weeks. Evaluations most recently notable for small fluid collection in the right abdomen beside the liver graft.     ED eval at this time notable for hemodynamic stability, no leukocytosis, afebrile, w/ a slight increase in size of the fluid collection per imaging       Past Medical History    Past Medical History:   Diagnosis Date    Anemia     Anxiety     Cold sore     Depressive disorder     Diabetes (H)     Type 2 DM/No Insulin     Encephalopathy     Esophageal varices without bleeding (H)     grade I    History of blood transfusion     10/2019    History of seizure     diabetic seizure    Insomnia     Liver cirrhosis secondary to CUETO (H) 05/28/2020    Migraine     Pleural effusion     Thrombocytopenia (H)     Thyroid disease        Past Surgical History   Past Surgical History:   Procedure Laterality Date    APPENDECTOMY      1989    BENCH LIVER N/A 11/2/2022    Procedure: Bench liver;  Surgeon: Delbert Morgan MD;  Location: [facility] OR    COLONOSCOPY       2020 at [facility]     ENT SURGERY  1998    tempanoplasty    GI SURGERY      EGD 2020 at [facility]     GYN SURGERY      laparoscopic ablation    IR TRANSVEN INTRAHEPATIC PORTOSYST SHUNT  2021    MAMMOPLASTY REDUCTION BILATERAL      ND STAB PHELBECTOMY VARICOSE VEINS, LESS THAN 10 INCISIONS, ONE EXTREMITY      RNY gastric bypass  2006    retoocolic, retrogastric, [facility]    TONSILLECTOMY & ADENOIDECTOMY Bilateral 1978    TRANSPLANT LIVER RECIPIENT  DONOR N/A 2022    Procedure: TRANSPLANT, LIVER, RECIPIENT,  DONOR;  Surgeon: Delbert Morgan MD;  Location: [facility] OR    WISDOM TEETH EXTRACTION         Prior to Admission Medications   Prior to Admission Medications   Prescriptions Last Dose Informant Patient Reported? Taking?    magnesium glycinate lysinate chelate (DOCTOR'S BEST) 100 MG TABS tablet   No No   Sig: Take 2 tablets (200 mg) by mouth 3 times daily   Continuous Blood Gluc  (DEXCOM G6 ) LUNA  Spouse/Significant Other Yes No   Sig: Use as directed for continuous glucose monitoring.   Continuous Blood Gluc Sensor (DEXCOM G6 SENSOR) MISC  Spouse/Significant Other Yes No   Sig: Use as directed for continuous glucose monitoring.  Change sensor every 10 days.   Continuous Blood Gluc Transmit (DEXCOM G6 TRANSMITTER) MISC  Spouse/Significant Other Yes No   Sig: Use as directed for continuous glucose monitoring.  Change every 3 months.   LORazepam (ATIVAN) 0.5 MG tablet  Spouse/Significant Other Yes No   Sig: Take 0.5 mg by mouth nightly as needed For anxiety   acetaminophen (TYLENOL) 325 MG tablet   No No   Sig: Take 3 tablets (975 mg) by mouth every 8 hours as needed for mild pain   amylase-lipase-protease (CREON 24) 92698-98100 units CPEP per EC capsule  Spouse/Significant Other No No   Sig: Take 2 capsules by mouth 3 times daily (with meals)   amylase-lipase-protease (CREON 24) 83292-88965 units CPEP per EC capsule  Spouse/Significant Other  No No   Sig: Take 1 capsule by mouth Take with snacks or supplements (with snacks)   aspirin (ASA) 81 MG chewable tablet  Spouse/Significant Other No No   Sig: Take 1 tablet (81 mg) by mouth daily   calcium carbonate-vitamin D (OSCAL) 500-5 MG-MCG tablet  Spouse/Significant Other Yes No   Sig: Take 1 tablet by mouth 2 times daily   capsaicin 0.035 % CREA  Spouse/Significant Other Yes No   Sig: Externally apply topically 3 times daily   cyanocobalamin (VITAMIN B-12) 1000 MCG tablet  Spouse/Significant Other Yes No   Sig: Take 1,000 mcg by mouth every 7 days Take 1 tablet by mouth every 7 days on Wednesdays   cyclobenzaprine (FLEXERIL) 10 MG tablet   No No   Sig: Take 1 tablet (10 mg) by mouth every 8 hours as needed for muscle spasms   dapsone (ACZONE) 25 MG tablet   No No   Sig: Take 2 tablets (50 mg) by mouth daily   escitalopram (LEXAPRO) 20 MG tablet  Spouse/Significant Other Yes No   Sig: Take 20 mg by mouth daily   folic acid (FOLVITE) 1 MG tablet  Spouse/Significant Other Yes No   Sig: Take 1 mg by mouth every morning   gabapentin (NEURONTIN) 100 MG capsule   No No   Sig: Take 2 capsules (200 mg) by mouth 3 times daily   insulin aspart (NOVOLOG VIAL) 100 UNITS/ML vial  Spouse/Significant Other No No   Sig: Inject 1-10 Units Subcutaneous 4 times daily (with meals and nightly)   insulin glargine (LANTUS PEN) 100 UNIT/ML pen   No No   Sig: Inject 11 Units Subcutaneous every morning (before breakfast)   insulin pen needle (BD GRISEL U/F) 32G X 4 MM miscellaneous  Spouse/Significant Other Yes No   Sig: Inject 1 each Subcutaneous   levothyroxine (SYNTHROID/LEVOTHROID) 150 MCG tablet  Spouse/Significant Other Yes No   Sig: Take 1 tablet by mouth daily   melatonin 5 MG tablet  Spouse/Significant Other Yes No   Sig: Take 5 mg by mouth At Bedtime   multivitamin CF FORMULA (DEKAS PLUS) capsule  Spouse/Significant Other No No   Sig: Take 1 capsule by mouth daily   mycophenolate (GENERIC EQUIVALENT) 250 MG capsule   No No    Sig: Take 2 capsules (500 mg) by mouth two times daily   ondansetron (ZOFRAN ODT) 8 MG ODT tab   No No   Sig: Take 1 tablet (8 mg) by mouth every 8 hours as needed for nausea   polyethylene glycol (MIRALAX) 17 GM/Dose powder  Spouse/Significant Other No No   Sig: Take 17 g by mouth daily   prochlorperazine (COMPAZINE) 10 MG tablet   No No   Sig: Take 0.5 tablets (5 mg) by mouth every 8 hours as needed for nausea or vomiting   rizatriptan (MAXALT) 10 MG tablet  Spouse/Significant Other Yes No   Sig: Take 10 mg by mouth as needed Take 1 Tablet (10 mg) by mouth as needed for Headache. at onset of migraine. May repeat in 2 hr if needed up to 30mg in 24 hr & MAX use 5 days/month. Do not use within 24 hours of an ergotamine preparation or a different triptan.   sitagliptin (JANUVIA) 100 MG tablet  Spouse/Significant Other No No   Sig: Take 1 tablet (100 mg) by mouth daily   tacrolimus (GENERIC EQUIVALENT) 0.5 MG capsule   No No   Sig: Take 1 capsule (0.5 mg) by mouth as needed (For dose changes) Total dose 6 mg AM, 5 mg PM   tacrolimus (GENERIC EQUIVALENT) 1 MG capsule   No No   Sig: Take 1 capsule (1 mg) by mouth every morning Total dose 6 mg AM, 5 mg PM   tacrolimus (GENERIC EQUIVALENT) 5 MG capsule   No No   Sig: Take 1 capsule (5 mg) by mouth 2 times daily Total dose 6 mg AM, 5 mg PM   topiramate (TOPAMAX) 25 MG tablet   No No   Sig: Take 3 tablets (75 mg) by mouth At Bedtime   traZODone (DESYREL) 50 MG tablet  Spouse/Significant Other Yes No   Sig: Take 50 mg by mouth At Bedtime   valGANciclovir (VALCYTE) 450 MG tablet   Yes No   Sig: Take 1 tablet (450 mg) by mouth daily      Facility-Administered Medications: None        Review of Systems    The 10 point Review of Systems is negative other than noted in the HPI or here.     Social History   I have reviewed this patient's social history and updated it with pertinent information if needed.  Social History     Tobacco Use    Smoking status: Never    Smokeless  tobacco: Never   Substance Use Topics    Alcohol use: Not Currently     Comment: Last drink was in 2017    Drug use: Never       Family History   I have reviewed this patient's family history and updated it with pertinent information if needed.  Family History   Problem Relation Age of Onset    Anesthesia Reaction Nephew         PONV    Bleeding Disorder No family hx of     Clotting Disorder No family hx of        Allergies   Allergies   Allergen Reactions    Methylprednisolone Hives     Per patient, reaction occurred in 1999 or earlier. Broke out in round, flat hives in neck and chest area. No shortness of breath or swelling. Unknown if reaction occurred immediately after administration.     Adhesive Tape Itching    Oxycodone      slurring    Droperidol Anxiety    Nsaids Other (See Comments)     GI bleed.    Prednisone Anxiety, Hives and Rash     Per patient she has tolerated this since        Physical Exam   Vital Signs: Temp: 97.8  F (36.6  C) Temp src: Oral BP: (!) 133/92 Pulse: 54   Resp: 18 SpO2: 99 % O2 Device: None (Room air)    Weight: 162 lbs 0 ozNo intake or output data in the 24 hours ending 01/18/23 0322    Alert, resting comfortably in NAD/NTA. Cooperative  HEENT: NC/AT, sclerae anicteric. Oral mucosa moist  Neck: Trachea midline  Pulm: NLB on RA, no tachypnea/dyspnea  CV: RRR by RP, non-cyanotic, no LE edema.  ABD: Soft, Mildly tender, non-distended, no guarding or rebound. Healing incision, no cellulitic changes.  No obvious organomegaly.   Extrem: MA4E, no obvious deformities  Neuro:  A/Ox3, NFD, very anxious   Skin: Warm and well perfused        Data     I have personally reviewed the following data over the past 24 hrs:    1.8 (L)  \   8.5 (L)   / 142 (L)     138; 135 (L) 111 (H); 109 (H) 18.6; 18.7 /  72   5.2 20 (L); 20 (L) 1.18 (H); 1.16 (H) \       ALT: 7 (L); 8 (L) AST: 21; 20 AP: 66; 64 TBILI: 0.2; 0.2   ALB: 3.0 (L); 3.0 (L) TOT PROTEIN: 5.2 (L); 5.1 (L) LIPASE: 7 (L)

## 2023-01-18 NOTE — PROGRESS NOTES
Immunosuppression Management Note:    Susan Puckett is a 52 year old female who is seen today  for immunosuppression management     I, Alexei Alcaraz MD, I have examined the patient with our ANYA/Fellow as part of a shared visit.   I participated in the rounds,  discussed and agree with the note and findings and  reviewed today's vital signs, medications, labs and imaging as noted in this note.  I  reviewed the  immunosuppression medications.  I personally provided a substantive portion of the care of this patient. I personally performed the immunosuppressive management of this patient, reviewed the overall  immunosuppression including drug levels, allograft function and provided the recommendations to adjust the dose to provide optimal levels to prevent rejection of the allograft and prevent toxicity to the organs. This was complex care due to the fresh allograft.      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 HyperK resolved on recheck  CT Abdomen/pelvis shows large stool burden    Graft function: Good, LFTs stable.  Immunosuppression management:   Tac 6/5. This morning's tac level was after missed dose last night.   mg BID  Complexity of management: Medium. Contributing factors: leukopenia  Hematology: Acute on chronic anemia: Hgb stable ~(  Leukopenia: Likely secondary to meds  Cardiorespiratory: Stable   GI/Nutrition: CT scan showing large stool burden, will try more aggressive bowel regimen. Patient states she has been unable to take much PO and is getting IV zofran   Endocrine: sliding scale  "insulin ordered, has not needed any  Fluid/Electrolytes: MIVF: NS at 125 ml/hr, hyperkalemia resolved  : No galloway indicated  Infectious disease:   Prophylaxis: DVT, fall, GI, fungal  Disposition: ED, will keep patient until she has a bowel movement and ensure she is able to take PO    Medical Decision Making: Medium  Subsequent visit 97711 (moderate level decision making)    ANYA/Fellow/Resident Provider: Irina Landa PA-C     Faculty: Alexei Alcaraz M.D.    __________________________________________________________________  Transplant History: Admitted 1/17/2023 for abd pain/constipation.   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    11/2/2022 (Liver), Postoperative day: 77     Interval History: History is obtained from the patient and family  Overnight events: Still having RUQ abdominal pain    ROS:   A 10-point review of systems was negative except as noted above.    Curent Meds:   amylase-lipase-protease  2 capsule Oral TID w/meals    aspirin  81 mg Oral Daily    dapsone  50 mg Oral Daily    docusate sodium  100 mg Oral BID    gabapentin  200 mg Oral TID    insulin aspart  1-6 Units Subcutaneous Q4H    levothyroxine  150 mcg Oral QAM AC    magnesium hydroxide  30 mL Oral BID    magnesium hydroxide  30 mL Oral Once    mycophenolate  500 mg Oral two times daily    polyethylene glycol  34 g Oral BID    polyethylene glycol  238 g Oral Once    sennosides  8.6 mg Oral BID    sodium chloride (PF)  3 mL Intracatheter Q8H    tacrolimus  5 mg Oral QPM    tacrolimus  6 mg Oral QAM    valGANciclovir  450 mg Oral Daily       Physical Exam:     Admit Weight: 73.5 kg (162 lb)    Current Vitals:   BP (!) 146/88 (BP Location: Right arm)   Pulse 61   Temp 97.4  F (36.3  C) (Oral)   Resp 16   Ht 1.689 m (5' 6.5\")   Wt 73.5 kg (162 lb)   LMP  (LMP Unknown)   SpO2 100%   BMI 25.76 kg/m           Vital sign ranges:    Temp:  [97.4  F (36.3  C)-97.8  F (36.6  C)] 97.4  F " "(36.3  C)  Pulse:  [53-61] 61  Resp:  [16-18] 16  BP: (126-146)/(74-92) 146/88  SpO2:  [96 %-100 %] 100 %  Patient Vitals for the past 24 hrs:   BP Temp Temp src Pulse Resp SpO2 Height Weight   01/18/23 0854 (!) 146/88 97.4  F (36.3  C) Oral 61 16 100 % -- --   01/18/23 0608 133/87 -- -- 53 -- 99 % -- --   01/18/23 0400 132/74 97.4  F (36.3  C) Oral -- 18 96 % -- --   01/18/23 0145 (!) 133/92 -- -- -- -- 99 % -- --   01/17/23 2124 -- -- -- -- -- 100 % -- --   01/17/23 2123 126/80 -- -- 54 -- -- -- --   01/17/23 1930 (!) 145/85 -- -- -- -- 98 % -- --   01/17/23 1735 (!) 140/80 97.8  F (36.6  C) Oral 59 18 97 % 1.689 m (5' 6.5\") 73.5 kg (162 lb)     General Appearance: in mild distress.   Skin: normal, warm  Heart: perfused  Lungs: Nonlabored  Abdomen: Soft, nondistended  Extremities: edema: absent.   Neurologic: awake, alert and oriented. Tremor absent.    Frailty Scores       Frailty Scores 1/6/2022 10/18/2020    Final Score Frail Not Frail    Final Score Number 3 2            Data:   CMP  Recent Labs   Lab 01/18/23  1437 01/18/23  0831 01/18/23  0756 01/18/23  0216 01/18/23  0129 01/17/23  1852 01/16/23  0853 01/16/23  0853   NA  --   --  139  --   --  135*  138  --  144   POTASSIUM  --   --  5.3  --  5.2 5.6*  5.7*   < > 5.3   CHLORIDE  --   --  114*  --   --  109*  111*  --  115*   CO2  --   --  18*  --   --  20*  20*  --  26   * 113* 76   < >  --  76  74   < > 158*   BUN  --   --  15.1  --   --  18.7  18.6  --  17   CR  --   --  0.96*  --   --  1.16*  1.18*  --  0.95   GFRESTIMATED  --   --  72  --   --  57*  56*  --  73   MAURI  --   --  8.0*  --   --  7.8*  8.2*  --  8.4*   MAG  --   --  1.6*  --   --   --   --  1.7   PHOS  --   --  4.2  --   --   --   --  3.8   LIPASE  --   --   --   --   --  7*  --   --    ALBUMIN  --   --  2.6*  --   --  3.0*  3.0*  --  2.8*   BILITOTAL  --   --  0.2  --   --  0.2  0.2  --  0.3   ALKPHOS  --   --  60  --   --  64  66  --  70   AST  --   --  21  --   --  20 "  21  --  13   ALT  --   --  <5*  --   --  8*  7*  --  14    < > = values in this interval not displayed.     CBC  Recent Labs   Lab 01/18/23  0756 01/17/23  1852   HGB 8.7* 8.5*   WBC 1.4* 1.8*   * 142*     COAGSNo lab results found in last 7 days.    Invalid input(s): XA   Urinalysis  Recent Labs   Lab Test 01/17/23  2124 11/19/22  0937   COLOR Light Yellow Yellow   APPEARANCE Clear Slightly Cloudy*   URINEGLC Negative Negative   URINEBILI Negative Negative   URINEKETONE Negative Negative   SG 1.018 1.017   UBLD Negative Negative   URINEPH 5.5 5.5   PROTEIN Negative 20*   NITRITE Negative Positive*   LEUKEST Negative Large*   RBCU 1 3*   WBCU <1 >182*     Virology:  Hepatitis C Antibody   Date Value Ref Range Status   11/01/2022 Nonreactive Nonreactive Final

## 2023-01-18 NOTE — ED PROVIDER NOTES
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     Patient reports having ongoing LLQ abdominal and right incisional pain ongoing since prior to christmas. On Saturday 1/14, she developed a new right sided abdominal pain that was constant, sharp in nature, and radiated to the back. She has had some associated nausea but denies vomiting, appetite changes, fevers, chills, jaundice, urinary urgency, dysuria or history of kidney stones. Pain does not worsen with food ingestion. Patient does report that she is not adherent to her bowel regimen and and has noted daily BM's decrease from 3x per day to 1-2x per day since Saturday. Stool has been harder in consistency as well compared to baseline.     Patient was evaluated by PCP for these symptoms and underwent abdominal CT 1/14 at OSH which showed expected post-surgical changes of the liver and moderate stool burden and was otherwise unremarkable. Lab work yesterday showed normal LFT's and unchanged CBC and BMP from baseline. She called transplant team today given persistent pain who recommended further evaluation in ED.     Past Medical History  Past Medical History:   Diagnosis Date     Anemia      Anxiety      Cold sore      Depressive disorder      Diabetes (H)     Type 2 DM/No Insulin      Encephalopathy      Esophageal varices without bleeding (H)     grade I     History of blood transfusion     10/2019     History of seizure     diabetic seizure     Insomnia      Liver cirrhosis secondary to CUETO (H) 05/28/2020     Migraine      Pleural effusion      Thrombocytopenia (H)      Thyroid disease      Past Surgical History:    Procedure Laterality Date     APPENDECTOMY           BENCH LIVER N/A 2022    Procedure: Bench liver;  Surgeon: Delbert Morgan MD;  Location: [facility] OR     COLONOSCOPY      2020 at [facility]      ENT SURGERY      tempanoplasty     GI SURGERY      EGD 2020 at [facility]      GYN SURGERY      laparoscopic ablation     IR TRANSVEN INTRAHEPATIC PORTOSYST SHUNT  2021     MAMMOPLASTY REDUCTION BILATERAL       IA STAB PHELBECTOMY VARICOSE VEINS, LESS THAN 10 INCISIONS, ONE EXTREMITY       RNY gastric bypass  2006    retoocolic, retrogastric, [facility]     TONSILLECTOMY & ADENOIDECTOMY Bilateral      TRANSPLANT LIVER RECIPIENT  DONOR N/A 2022    Procedure: TRANSPLANT, LIVER, RECIPIENT,  DONOR;  Surgeon: Delbert Morgan MD;  Location: [facility] OR     WISDOM TEETH EXTRACTION        magnesium glycinate lysinate chelate (DOCTOR'S BEST) 100 MG TABS tablet  acetaminophen (TYLENOL) 325 MG tablet  amylase-lipase-protease (CREON 24) 31880-81570 units CPEP per EC capsule  amylase-lipase-protease (CREON 24) 18112-71742 units CPEP per EC capsule  aspirin (ASA) 81 MG chewable tablet  calcium carbonate-vitamin D (OSCAL) 500-5 MG-MCG tablet  capsaicin 0.035 % CREA  Continuous Blood Gluc  (DEXCOM G6 ) LUNA  Continuous Blood Gluc Sensor (DEXCOM G6 SENSOR) MISC  Continuous Blood Gluc Transmit (DEXCOM G6 TRANSMITTER) MISC  cyanocobalamin (VITAMIN B-12) 1000 MCG tablet  cyclobenzaprine (FLEXERIL) 10 MG tablet  dapsone (ACZONE) 25 MG tablet  escitalopram (LEXAPRO) 20 MG tablet  folic acid (FOLVITE) 1 MG tablet  gabapentin (NEURONTIN) 100 MG capsule  insulin aspart (NOVOLOG VIAL) 100 UNITS/ML vial  insulin glargine (LANTUS PEN) 100 UNIT/ML pen  insulin pen needle (BD GRISEL U/F) 32G X 4 MM miscellaneous  levothyroxine (SYNTHROID/LEVOTHROID) 150 MCG tablet  LORazepam (ATIVAN) 0.5 MG tablet  melatonin 5 MG tablet  multivitamin CF FORMULA (DEKAS PLUS)  "capsule  mycophenolate (GENERIC EQUIVALENT) 250 MG capsule  ondansetron (ZOFRAN ODT) 8 MG ODT tab  polyethylene glycol (MIRALAX) 17 GM/Dose powder  prochlorperazine (COMPAZINE) 10 MG tablet  rizatriptan (MAXALT) 10 MG tablet  sitagliptin (JANUVIA) 100 MG tablet  tacrolimus (GENERIC EQUIVALENT) 0.5 MG capsule  tacrolimus (GENERIC EQUIVALENT) 1 MG capsule  tacrolimus (GENERIC EQUIVALENT) 5 MG capsule  topiramate (TOPAMAX) 25 MG tablet  traZODone (DESYREL) 50 MG tablet  valGANciclovir (VALCYTE) 450 MG tablet      Allergies   Allergen Reactions     Methylprednisolone Hives     Per patient, reaction occurred in 1999 or earlier. Broke out in round, flat hives in neck and chest area. No shortness of breath or swelling. Unknown if reaction occurred immediately after administration.      Adhesive Tape Itching     Oxycodone      slurring     Droperidol Anxiety     Nsaids Other (See Comments)     GI bleed.     Prednisone Anxiety, Hives and Rash     Per patient she has tolerated this since     Family History  Family History   Problem Relation Age of Onset     Anesthesia Reaction Nephew         PONV     Bleeding Disorder No family hx of      Clotting Disorder No family hx of      Social History   Social History     Tobacco Use     Smoking status: Never     Smokeless tobacco: Never   Substance Use Topics     Alcohol use: Not Currently     Comment: Last drink was in 2017     Drug use: Never         A medically appropriate review of systems was performed with pertinent positives and negatives noted in the HPI, and all other systems negative.    Physical Exam   BP: (!) 140/80  Pulse: 59  Temp: 97.8  F (36.6  C)  Resp: 18  Height: 168.9 cm (5' 6.5\")  Weight: 73.5 kg (162 lb)  SpO2: 97 %  Physical Exam  HENT:      Head: Normocephalic.   Eyes:      General: No scleral icterus.  Cardiovascular:      Rate and Rhythm: Normal rate and regular rhythm.   Pulmonary:      Effort: Pulmonary effort is normal.      Breath sounds: Normal breath " sounds.   Abdominal:      General: There is no distension.      Palpations: Abdomen is soft.      Hernia: No hernia is present.      Comments: Incision healing appropriately. No evidence of cellulitis. Abdomen tender to palpation diffusely, worse on right and epigastrically. Small hard lump on right lateral incision, no fluctuance. Mild CVA tenderness bilaterally.    Skin:     General: Skin is warm.   Neurological:      Mental Status: She is alert.           ED Course, Procedures, & Data      Procedures                      Results for orders placed or performed during the hospital encounter of 01/17/23   [place] Draw     Status: None (In process)    Narrative    The following orders were created for panel order [place] Draw.  Procedure                               Abnormality         Status                     ---------                               -----------         ------                     Extra Blue Top Tube[[id number]]                              In process                 Extra Red Top Tube[[id number]]                                                          Extra Green Top (Lithium...[[id number]]                      In process                 Extra Purple Top Tube[[id number]]                            In process                 Extra Green Top (Lithium...[[id number]]                      In process                   Please view results for these tests on the individual orders.     Medications   metoclopramide (REGLAN) injection 5 mg (has no administration in time range)   morphine (PF) injection 4 mg (has no administration in time range)     Labs Ordered and Resulted from Time of ED Arrival to Time of ED Departure - No data to display  No orders to display          Medical Decision Making  The patient presented with a problem that is a chronic illness mild to moderate exacerbation, progression, or side effect of treatment.    The patient's evaluation involved:  review of 3+ prior external note(s) (Previous  notes)  ordering and review of 3+ test(s) (see separate area of note for details)  review of 3+ test result(s) ordered prior to this encounter (see separate area of note for details)  discussion of management or test interpretation with another health professional (Transplant Surgery)    The patient's management involved a decision regarding emergency major surgery and a decision regarding hospitalization.      Assessment & Plan    52 year old female presenting to ED for evaluation of constant abdominal pain per recommendation by transplant coordinator.     Given recent transplant, will obtain hepatic US to evaluate transplanted liver and vasculature although low concern for stenosis given normal hepatic panel yesterday.     Other etiologies include constipation given evidence of moderate stool burden on abdominal CT on 1/14 and decreased frequency of BM's since symptom onset this past Saturday. Low concern for bowel obstruction given continued bowel movements. Pain could also be incisional in nature especially given increased tenderness surrounding right sided incisions. CBC, CMP, UA and lipase ordered. CBC unchanged from previous baseline, UA negative.     Pain improved and patient more comfortable with IV Reglan and morphine although abdomen continues to be tender to palpation.     Ultrasound shows fluid collection along falciform ligament which may be seroma versus developing hematoma.  I discussed case with Transplant Surgery who recommends CT abdomen pelvis without contrast.  They will admit to their service.    I have reviewed the nursing notes. I have reviewed the findings, diagnosis, plan and need for follow up with the patient.    New Prescriptions    No medications on file       Final diagnoses:   None     Kwame Lubin, MS4    Delbert Walters DO  [facility] EMERGENCY DEPARTMENT  1/17/2023     Delbert Walters,   01/18/23 0115

## 2023-01-18 NOTE — PLAN OF CARE
Goal Outcome Evaluation:      Plan of Care Reviewed With: patient, spouse    Overall Patient Progress: no changeOverall Patient Progress: no change    Pt continues to have abdominal pain.  Morphine x1 and tylenol and flexeril.  Pt trying to avoid narcotics due to constipation already.  Pt and  expressing frustration with care team and plan. Pt feeling like they are not ready to be discharged home due to pain unresolved.  Started on a stool regime and this RN will administer soap neville enema.  This RN communicated with team about pt and  frustrations.  Will continue with care plan and notify MD if any changes.

## 2023-01-18 NOTE — UTILIZATION REVIEW
"  [facility] Utilization Review  Admission Status; Secondary Review Determination     Admission Date: 1/17/2023  6:36 PM      Under the authority of the Utilization Management Committee, the utilization review process indicated a secondary review on the above patient.  The review outcome is based on review of the medical records, discussions with staff, and applying clinical experience noted on the date of the review.        (X) Observation Status Appropriate - This patient does not meet hospital inpatient criteria and is placed in observation status. If this patient's primary payer is Medicare and was admitted as an inpatient, Condition Code 44 should be used and patient status changed to \"observation\".   () Observation Status concurrent Review           RATIONALE FOR DETERMINATION   52-year-old female with history of gastric bypass, CUETO cirrhosis status post DD LT, chronic pancreatic insufficiency secondary to pancreatic atrophy admitted to [facility] ED with acute on chronic abdominal pain of unclear etiology with growing fluid collection. CT abdomen pelvis shows stable postop changes hepatic transplant, small fluid collection unchanged in the falciform ligament, small fluid in the left upper quadrant around spleen is increased, postop changes gastric bypass, repeat BMP with regular diet.  Pain control with Tylenol and Flexeril, started on bowel regimen.  Complex surgical history, admitted with acute on chronic abdominal pain, received IV morphine, now on oral narcotics, does not meet criteria for inpatient stay, recommend change to observation status, communicated to Dr. Alcaraz, Dr. Miranda    The severity of illness, intensity of service provided, expected LOS make the care appropriate for observation status at this time.        The information on this document is developed by the utilization review team in order for the business office to ensure compliance.  This only denotes the appropriateness of " proper admission status and does not reflect the quality of care rendered.         The definitions of Inpatient Status and Observation Status used in making the determination above are those provided in the CMS Coverage Manual, Chapter 1 and Chapter 6, section 70.4.      Sincerely,       Chrystal Mcnally MD  Physician Advisor  Utilization Review-[place]    Phone: [phone]

## 2023-01-19 ENCOUNTER — APPOINTMENT (OUTPATIENT)
Dept: GENERAL RADIOLOGY | Facility: CLINIC | Age: 53
End: 2023-01-19
Attending: PHYSICIAN ASSISTANT
Payer: COMMERCIAL

## 2023-01-19 VITALS
BODY MASS INDEX: 26.38 KG/M2 | WEIGHT: 168.1 LBS | DIASTOLIC BLOOD PRESSURE: 78 MMHG | RESPIRATION RATE: 15 BRPM | SYSTOLIC BLOOD PRESSURE: 133 MMHG | HEART RATE: 60 BPM | HEIGHT: 67 IN | TEMPERATURE: 97.9 F | OXYGEN SATURATION: 99 %

## 2023-01-19 LAB
ALBUMIN SERPL BCG-MCNC: 2.7 G/DL (ref 3.5–5.2)
ALP SERPL-CCNC: 68 U/L (ref 35–104)
ALT SERPL W P-5'-P-CCNC: 7 U/L (ref 10–35)
ANION GAP SERPL CALCULATED.3IONS-SCNC: 9 MMOL/L (ref 7–15)
AST SERPL W P-5'-P-CCNC: 25 U/L (ref 10–35)
BASOPHILS # BLD MANUAL: 0 10E3/UL (ref 0–0.2)
BASOPHILS NFR BLD MANUAL: 2 %
BILIRUB DIRECT SERPL-MCNC: <0.2 MG/DL (ref 0–0.3)
BILIRUB SERPL-MCNC: 0.2 MG/DL
BUN SERPL-MCNC: 14.3 MG/DL (ref 6–20)
CALCIUM SERPL-MCNC: 7.6 MG/DL (ref 8.6–10)
CHLORIDE SERPL-SCNC: 111 MMOL/L (ref 98–107)
CREAT SERPL-MCNC: 1.01 MG/DL (ref 0.51–0.95)
DEPRECATED HCO3 PLAS-SCNC: 16 MMOL/L (ref 22–29)
EOSINOPHIL # BLD MANUAL: 0.1 10E3/UL (ref 0–0.7)
EOSINOPHIL NFR BLD MANUAL: 9 %
ERYTHROCYTE [DISTWIDTH] IN BLOOD BY AUTOMATED COUNT: 13.2 % (ref 10–15)
GFR SERPL CREATININE-BSD FRML MDRD: 67 ML/MIN/1.73M2
GLUCOSE BLDC GLUCOMTR-MCNC: 106 MG/DL (ref 70–99)
GLUCOSE BLDC GLUCOMTR-MCNC: 133 MG/DL (ref 70–99)
GLUCOSE BLDC GLUCOMTR-MCNC: 180 MG/DL (ref 70–99)
GLUCOSE BLDC GLUCOMTR-MCNC: 85 MG/DL (ref 70–99)
GLUCOSE BLDC GLUCOMTR-MCNC: 93 MG/DL (ref 70–99)
GLUCOSE SERPL-MCNC: 103 MG/DL (ref 70–99)
HCT VFR BLD AUTO: 28.8 % (ref 35–47)
HGB BLD-MCNC: 8.6 G/DL (ref 11.7–15.7)
HOLD SPECIMEN: NORMAL
LYMPHOCYTES # BLD MANUAL: 0.4 10E3/UL (ref 0.8–5.3)
LYMPHOCYTES NFR BLD MANUAL: 34 %
MAGNESIUM SERPL-MCNC: 1.8 MG/DL (ref 1.7–2.3)
MCH RBC QN AUTO: 29.7 PG (ref 26.5–33)
MCHC RBC AUTO-ENTMCNC: 29.9 G/DL (ref 31.5–36.5)
MCV RBC AUTO: 99 FL (ref 78–100)
MONOCYTES # BLD MANUAL: 0.1 10E3/UL (ref 0–1.3)
MONOCYTES NFR BLD MANUAL: 8 %
MYELOCYTES # BLD MANUAL: 0 10E3/UL
MYELOCYTES NFR BLD MANUAL: 1 %
NEUTROPHILS # BLD MANUAL: 0.6 10E3/UL (ref 1.6–8.3)
NEUTROPHILS NFR BLD MANUAL: 46 %
NRBC # BLD AUTO: 0 10E3/UL
NRBC BLD MANUAL-RTO: 1 %
PHOSPHATE SERPL-MCNC: 3.5 MG/DL (ref 2.5–4.5)
PLAT MORPH BLD: ABNORMAL
PLATELET # BLD AUTO: 123 10E3/UL (ref 150–450)
POTASSIUM SERPL-SCNC: 5.2 MMOL/L (ref 3.4–5.3)
PROT SERPL-MCNC: 4.7 G/DL (ref 6.4–8.3)
RBC # BLD AUTO: 2.9 10E6/UL (ref 3.8–5.2)
RBC MORPH BLD: ABNORMAL
SODIUM SERPL-SCNC: 136 MMOL/L (ref 136–145)
WBC # BLD AUTO: 1.2 10E3/UL (ref 4–11)

## 2023-01-19 PROCEDURE — 74018 RADEX ABDOMEN 1 VIEW: CPT | Mod: 26 | Performed by: RADIOLOGY

## 2023-01-19 PROCEDURE — 250N000013 HC RX MED GY IP 250 OP 250 PS 637: Performed by: SURGERY

## 2023-01-19 PROCEDURE — 96375 TX/PRO/DX INJ NEW DRUG ADDON: CPT

## 2023-01-19 PROCEDURE — 85007 BL SMEAR W/DIFF WBC COUNT: CPT | Performed by: PHYSICIAN ASSISTANT

## 2023-01-19 PROCEDURE — G0378 HOSPITAL OBSERVATION PER HR: HCPCS

## 2023-01-19 PROCEDURE — 258N000003 HC RX IP 258 OP 636: Performed by: EMERGENCY MEDICINE

## 2023-01-19 PROCEDURE — 82248 BILIRUBIN DIRECT: CPT

## 2023-01-19 PROCEDURE — 250N000013 HC RX MED GY IP 250 OP 250 PS 637

## 2023-01-19 PROCEDURE — 84100 ASSAY OF PHOSPHORUS: CPT

## 2023-01-19 PROCEDURE — 96372 THER/PROPH/DIAG INJ SC/IM: CPT

## 2023-01-19 PROCEDURE — 36415 COLL VENOUS BLD VENIPUNCTURE: CPT

## 2023-01-19 PROCEDURE — 250N000012 HC RX MED GY IP 250 OP 636 PS 637

## 2023-01-19 PROCEDURE — 99024 POSTOP FOLLOW-UP VISIT: CPT | Performed by: TRANSPLANT SURGERY

## 2023-01-19 PROCEDURE — 96372 THER/PROPH/DIAG INJ SC/IM: CPT | Performed by: TRANSPLANT SURGERY

## 2023-01-19 PROCEDURE — 250N000011 HC RX IP 250 OP 636

## 2023-01-19 PROCEDURE — 82962 GLUCOSE BLOOD TEST: CPT

## 2023-01-19 PROCEDURE — 74018 RADEX ABDOMEN 1 VIEW: CPT

## 2023-01-19 PROCEDURE — 83735 ASSAY OF MAGNESIUM: CPT

## 2023-01-19 PROCEDURE — 250N000011 HC RX IP 250 OP 636: Performed by: TRANSPLANT SURGERY

## 2023-01-19 PROCEDURE — 80053 COMPREHEN METABOLIC PANEL: CPT

## 2023-01-19 PROCEDURE — 250N000013 HC RX MED GY IP 250 OP 250 PS 637: Performed by: PHYSICIAN ASSISTANT

## 2023-01-19 PROCEDURE — 85027 COMPLETE CBC AUTOMATED: CPT | Performed by: PHYSICIAN ASSISTANT

## 2023-01-19 RX ORDER — LEVOTHYROXINE SODIUM 125 UG/1
125 TABLET ORAL DAILY
COMMUNITY

## 2023-01-19 RX ORDER — SODIUM BICARBONATE 650 MG/1
650 TABLET ORAL 2 TIMES DAILY
Status: DISCONTINUED | OUTPATIENT
Start: 2023-01-19 | End: 2023-01-19 | Stop reason: HOSPADM

## 2023-01-19 RX ORDER — POLYETHYLENE GLYCOL 3350 17 G/17G
34 POWDER, FOR SOLUTION ORAL 2 TIMES DAILY
Qty: 510 G | Refills: 3 | Status: SHIPPED | OUTPATIENT
Start: 2023-01-19 | End: 2023-01-19

## 2023-01-19 RX ORDER — BISACODYL 10 MG
10 SUPPOSITORY, RECTAL RECTAL DAILY PRN
Status: DISCONTINUED | OUTPATIENT
Start: 2023-01-19 | End: 2023-01-19 | Stop reason: HOSPADM

## 2023-01-19 RX ORDER — SODIUM BICARBONATE 650 MG/1
650 TABLET ORAL 2 TIMES DAILY
Qty: 60 TABLET | Refills: 1 | Status: SHIPPED | OUTPATIENT
Start: 2023-01-19 | End: 2023-01-19

## 2023-01-19 RX ORDER — SENNOSIDES 8.6 MG
1-2 TABLET ORAL 2 TIMES DAILY
Qty: 60 TABLET | Refills: 1
Start: 2023-01-19 | End: 2023-03-02 | Stop reason: ALTCHOICE

## 2023-01-19 RX ORDER — DOCUSATE SODIUM 100 MG/1
100 CAPSULE, LIQUID FILLED ORAL 2 TIMES DAILY
Qty: 60 CAPSULE | Refills: 1
Start: 2023-01-19 | End: 2023-05-10

## 2023-01-19 RX ORDER — POLYETHYLENE GLYCOL 3350 17 G/17G
34 POWDER, FOR SOLUTION ORAL 2 TIMES DAILY
Qty: 510 G | Refills: 3
Start: 2023-01-19 | End: 2023-05-10

## 2023-01-19 RX ADMIN — INSULIN ASPART 1 UNITS: 100 INJECTION, SOLUTION INTRAVENOUS; SUBCUTANEOUS at 17:26

## 2023-01-19 RX ADMIN — ONDANSETRON HYDROCHLORIDE 4 MG: 2 INJECTION, SOLUTION INTRAMUSCULAR; INTRAVENOUS at 10:28

## 2023-01-19 RX ADMIN — GABAPENTIN 200 MG: 100 CAPSULE ORAL at 14:10

## 2023-01-19 RX ADMIN — DAPSONE 50 MG: 25 TABLET ORAL at 08:00

## 2023-01-19 RX ADMIN — VALGANCICLOVIR HYDROCHLORIDE 450 MG: 450 TABLET ORAL at 08:01

## 2023-01-19 RX ADMIN — PANCRELIPASE 2 CAPSULE: 24000; 76000; 120000 CAPSULE, DELAYED RELEASE PELLETS ORAL at 08:04

## 2023-01-19 RX ADMIN — LEVOTHYROXINE SODIUM 150 MCG: 75 TABLET ORAL at 08:00

## 2023-01-19 RX ADMIN — ASPIRIN 81 MG: 81 TABLET, CHEWABLE ORAL at 08:00

## 2023-01-19 RX ADMIN — SODIUM BICARBONATE 650 MG: 650 TABLET ORAL at 11:26

## 2023-01-19 RX ADMIN — DOCUSATE SODIUM 226 ML: 50 LIQUID ORAL at 11:49

## 2023-01-19 RX ADMIN — CYCLOBENZAPRINE HYDROCHLORIDE 10 MG: 5 TABLET, FILM COATED ORAL at 00:08

## 2023-01-19 RX ADMIN — PANCRELIPASE 2 CAPSULE: 24000; 76000; 120000 CAPSULE, DELAYED RELEASE PELLETS ORAL at 14:10

## 2023-01-19 RX ADMIN — SODIUM CHLORIDE: 9 INJECTION, SOLUTION INTRAVENOUS at 04:32

## 2023-01-19 RX ADMIN — SENNOSIDES 8.6 MG: 8.6 TABLET, FILM COATED ORAL at 08:00

## 2023-01-19 RX ADMIN — DOCUSATE SODIUM 100 MG: 100 CAPSULE, LIQUID FILLED ORAL at 08:00

## 2023-01-19 RX ADMIN — ACETAMINOPHEN 500 MG: 500 TABLET ORAL at 00:08

## 2023-01-19 RX ADMIN — FILGRASTIM 350 MCG: 480 INJECTION, SOLUTION INTRAVENOUS; SUBCUTANEOUS at 14:10

## 2023-01-19 RX ADMIN — GABAPENTIN 200 MG: 100 CAPSULE ORAL at 08:00

## 2023-01-19 RX ADMIN — SODIUM CHLORIDE: 9 INJECTION, SOLUTION INTRAVENOUS at 11:49

## 2023-01-19 RX ADMIN — TACROLIMUS 6 MG: 5 CAPSULE ORAL at 08:01

## 2023-01-19 RX ADMIN — MYCOPHENOLATE MOFETIL 500 MG: 250 CAPSULE ORAL at 08:01

## 2023-01-19 ASSESSMENT — ACTIVITIES OF DAILY LIVING (ADL)
ADLS_ACUITY_SCORE: 41
ADLS_ACUITY_SCORE: 39
ADLS_ACUITY_SCORE: 41
DEPENDENT_IADLS:: INDEPENDENT
ADLS_ACUITY_SCORE: 39
ADLS_ACUITY_SCORE: 41
ADLS_ACUITY_SCORE: 39

## 2023-01-19 NOTE — PROGRESS NOTES
A&O X4. VSS on RA. Up indep to BSC. Miralax started.   Report given to RN on 7A. Transport ordered.

## 2023-01-19 NOTE — PLAN OF CARE
"Vital signs:  Temp: 97.9  F (36.6  C) Temp src: Oral BP: 133/78 Pulse: 60   Resp: 15 SpO2: 99 % O2 Device: None (Room air)   Height: 168.9 cm (5' 6.5\") Weight: 76.2 kg (168 lb 1.6 oz)    Pink lady enema given with good effect, tolerating diet and pain controlled. AVS reviewed with patient and spouse, all questions answered. IV removed, patient off the unit in stable condition.     Problem: Plan of Care - These are the overarching goals to be used throughout the patient stay.    Goal: Plan of Care Review  Description: The Plan of Care Review/Shift note should be completed every shift.  The Outcome Evaluation is a brief statement about your assessment that the patient is improving, declining, or no change.  This information will be displayed automatically on your shift note.  Outcome: Met  Flowsheets (Taken 1/19/2023 9684)  Plan of Care Reviewed With: patient  Overall Patient Progress: improving   Goal Outcome Evaluation:      Plan of Care Reviewed With: patient    Overall Patient Progress: improvingOverall Patient Progress: improving           "

## 2023-01-19 NOTE — PLAN OF CARE
"Goal Outcome Evaluation:    /75 (BP Location: Left arm)   Pulse 60   Temp 97.5  F (36.4  C) (Oral)   Resp 14   Ht 1.689 m (5' 6.5\")   Wt 76.2 kg (168 lb 1.6 oz)   LMP  (LMP Unknown)   SpO2 94%   BMI 26.73 kg/m      Shift: Pt is a 52 year old female with Chronic pancreatic insuffiencey with acute abdominal pain  VS: VSS on RA, afebrile.  Neuro: AOx4. Patient is logical and clear in speech.   BG: Last recorded BG was 93. No Novolog was given  Respiratory: Clear breath sounds but patient reported having SOB upon exertion.  Pain/Nausea/PRN: Patient stated having 4/10 pain but wanted no PRN medications stated that it was manageable and just wanted rest.  Diet: Regular diet  LDA: Right Peripheral IV in upper forearm  GI/: No BM's reported. Patient taking aides to help produce BM.  Skin: Upper abdomen incisional site. Bruising located on right hand and left forearm   Mobility: Patient is up ad adriano. Patient reports feeling weak at times but Patient demonstrated 5/5 strength.  Plan: Continue to monitor patient's ability to have a bowel movement.    OBS Goal: Pt was vitally stable and pain was managed but did not meet passing BM.     Handoff given to following RN.        "

## 2023-01-19 NOTE — PROGRESS NOTES
Admitted/transferred from: ED  Time of arrival on unit:19:35   2 RN full  skin assessment completed by Mecca GARCIA  Skin assessment finding: issues found bruising found on right hand and left forearm   Interventions/actions: other None interventions at this time continue to monitor     Will continue to monitor.

## 2023-01-19 NOTE — CONSULTS
Care Management Initial Consult    General Information  Assessment completed with: Patient,  (Susan)  Type of CM/SW Visit: Initial Assessment    Primary Care Provider verified and updated as needed: Yes   Readmission within the last 30 days:        Reason for Consult: discharge planning  Advance Care Planning: Advance Care Planning Reviewed: present on chart          Communication Assessment  Patient's communication style: spoken language (English or Bilingual)    Hearing Difficulty or Deaf: no        Cognitive  Cognitive/Neuro/Behavioral: WDL                      Living Environment:   People in home: spouse   (Roly)  Current living Arrangements: house      Able to return to prior arrangements: yes       Family/Social Support:  Care provided by: self, spouse/significant other, child(suresh)  Provides care for: no one  Marital Status:   , Children, Other (specify)   (Roly)       Description of Support System: Supportive    Support Assessment: Adequate family and caregiver support    Current Resources:   Patient receiving home care services: No     Community Resources: None  Equipment currently used at home:    Supplies currently used at home:      Employment/Financial:  Employment Status: disabled        Financial Concerns: No concerns identified   Referral to Financial Worker: No       Lifestyle & Psychosocial Needs:  Social Determinants of Health     Tobacco Use: Low Risk      Smoking Tobacco Use: Never     Smokeless Tobacco Use: Never     Passive Exposure: Not on file   Alcohol Use: Not on file   Financial Resource Strain: Not on file   Food Insecurity: Not on file   Transportation Needs: Not on file   Physical Activity: Not on file   Stress: Not on file   Social Connections: Not on file   Intimate Partner Violence: Not on file   Depression: At risk     PHQ-2 Score: 4   Housing Stability: Not on file       Functional Status:  Prior to admission patient needed assistance:   Dependent ADLs::  "Independent  Dependent IADLs:: Independent  Assesssment of Functional Status: At functional baseline    Mental Health Status:  Anxiety - patient is established care with Dr. Sancho Gaines.     Chemical Dependency Status:     No concerns           Values/Beliefs:  Spiritual, Cultural Beliefs, Islam Practices, Values that affect care:                 Additional Information:  Susan is s/p DDLT 11/2/2022. She was admitted to [facility] on 1/17/2023 under observation due to persistent abdominal pain. I met with Susan today to update psychosocial assessment and provided supportive counseling during our visit. Susan voiced that she is frustrated with \"everything.\" We discussed things that she can control, priorities and stem of her frustrations. She mainly voiced that she would like to know exactly what is wrong, and see imaging to make sure that that is in fact what is causing her pain. I provided supportive counseling regarding this and also provided realistic expectations of health care. We focused on ways that she can \"let go\" of things she cannot control and focus on what she can control. She voiced that her spouse would everything \"fixed\" prior to her leaving the hospital. We again discussed realistic expectations of health care and needing to be in the hospital. She was receptive of this writer's discussion. She has an appointment with Dr. Gaines tomorrow at 0900 and unsure if she will be able to complete as she is hospitalized (however under observation). I encouraged her to keep appointments with Dr. Gaines for additional support regarding her adjustment to illness.     We reviewed insurance and Medicare coverage. She has some concerns regarding medications and how they are covered. Megan GODFREY completed a test claim on her dexcom, which is a $0 copay. She reports that they just obtained a prior auth. I explained the different between Medigap and advantage plans and the potential for \"extra help.\" She will call the senior " linkage line to inquire if she qualifies for any of those programs, and if she is able to switch to a medigap plan if she chooses.     No discharge needs identified at this time.     RENARD Alvarado, [facility]  Liver Transplant   [facility]  Phone: [phone]  Pager: [phone]

## 2023-01-20 ENCOUNTER — VIRTUAL VISIT (OUTPATIENT)
Dept: BEHAVIORAL HEALTH | Facility: CLINIC | Age: 53
End: 2023-01-20
Attending: INTERNAL MEDICINE
Payer: COMMERCIAL

## 2023-01-20 DIAGNOSIS — F33.1 MAJOR DEPRESSIVE DISORDER, RECURRENT EPISODE, MODERATE (H): Primary | ICD-10-CM

## 2023-01-20 PROCEDURE — 90791 PSYCH DIAGNOSTIC EVALUATION: CPT | Mod: 52 | Performed by: PSYCHOLOGIST

## 2023-01-20 ASSESSMENT — PATIENT HEALTH QUESTIONNAIRE - PHQ9
SUM OF ALL RESPONSES TO PHQ QUESTIONS 1-9: 12
SUM OF ALL RESPONSES TO PHQ QUESTIONS 1-9: 12
10. IF YOU CHECKED OFF ANY PROBLEMS, HOW DIFFICULT HAVE THESE PROBLEMS MADE IT FOR YOU TO DO YOUR WORK, TAKE CARE OF THINGS AT HOME, OR GET ALONG WITH OTHER PEOPLE: SOMEWHAT DIFFICULT

## 2023-01-23 ENCOUNTER — LAB (OUTPATIENT)
Dept: LAB | Facility: CLINIC | Age: 53
End: 2023-01-23
Payer: COMMERCIAL

## 2023-01-23 ENCOUNTER — TELEPHONE (OUTPATIENT)
Dept: TRANSPLANT | Facility: CLINIC | Age: 53
End: 2023-01-23

## 2023-01-23 DIAGNOSIS — Z94.4 LIVER REPLACED BY TRANSPLANT (H): Primary | ICD-10-CM

## 2023-01-23 DIAGNOSIS — Z94.4 LIVER TRANSPLANTED (H): ICD-10-CM

## 2023-01-23 DIAGNOSIS — D70.9 NEUTROPENIA (H): ICD-10-CM

## 2023-01-23 DIAGNOSIS — Z94.4 LIVER REPLACED BY TRANSPLANT (H): ICD-10-CM

## 2023-01-23 LAB
ALBUMIN SERPL-MCNC: 2.8 G/DL (ref 3.4–5)
ALP SERPL-CCNC: 75 U/L (ref 40–150)
ALT SERPL W P-5'-P-CCNC: 13 U/L (ref 0–50)
ANION GAP SERPL CALCULATED.3IONS-SCNC: 5 MMOL/L (ref 3–14)
AST SERPL W P-5'-P-CCNC: 21 U/L (ref 0–45)
BILIRUB DIRECT SERPL-MCNC: 0.1 MG/DL (ref 0–0.2)
BILIRUB SERPL-MCNC: 0.2 MG/DL (ref 0.2–1.3)
BUN SERPL-MCNC: 18 MG/DL (ref 7–30)
CALCIUM SERPL-MCNC: 7.8 MG/DL (ref 8.5–10.1)
CHLORIDE BLD-SCNC: 115 MMOL/L (ref 94–109)
CO2 SERPL-SCNC: 22 MMOL/L (ref 20–32)
CREAT SERPL-MCNC: 1.12 MG/DL (ref 0.52–1.04)
ERYTHROCYTE [DISTWIDTH] IN BLOOD BY AUTOMATED COUNT: 13.4 % (ref 10–15)
GFR SERPL CREATININE-BSD FRML MDRD: 60 ML/MIN/1.73M2
GLUCOSE BLD-MCNC: 208 MG/DL (ref 70–99)
HCT VFR BLD AUTO: 31.5 % (ref 35–47)
HGB BLD-MCNC: 9.3 G/DL (ref 11.7–15.7)
MAGNESIUM SERPL-MCNC: 1.8 MG/DL (ref 1.6–2.3)
MCH RBC QN AUTO: 29.2 PG (ref 26.5–33)
MCHC RBC AUTO-ENTMCNC: 29.5 G/DL (ref 31.5–36.5)
MCV RBC AUTO: 99 FL (ref 78–100)
PHOSPHATE SERPL-MCNC: 4.5 MG/DL (ref 2.5–4.5)
PLATELET # BLD AUTO: 149 10E3/UL (ref 150–450)
POTASSIUM BLD-SCNC: 5.2 MMOL/L (ref 3.4–5.3)
PROT SERPL-MCNC: 5.8 G/DL (ref 6.8–8.8)
RBC # BLD AUTO: 3.19 10E6/UL (ref 3.8–5.2)
SODIUM SERPL-SCNC: 142 MMOL/L (ref 133–144)
TACROLIMUS BLD-MCNC: 9.3 UG/L (ref 5–15)
TME LAST DOSE: NORMAL H
TME LAST DOSE: NORMAL H
WBC # BLD AUTO: 1.5 10E3/UL (ref 4–11)

## 2023-01-23 PROCEDURE — 82248 BILIRUBIN DIRECT: CPT

## 2023-01-23 PROCEDURE — 80197 ASSAY OF TACROLIMUS: CPT

## 2023-01-23 PROCEDURE — 80053 COMPREHEN METABOLIC PANEL: CPT

## 2023-01-23 PROCEDURE — 36415 COLL VENOUS BLD VENIPUNCTURE: CPT

## 2023-01-23 PROCEDURE — 83735 ASSAY OF MAGNESIUM: CPT

## 2023-01-23 PROCEDURE — 87799 DETECT AGENT NOS DNA QUANT: CPT

## 2023-01-23 PROCEDURE — 85027 COMPLETE CBC AUTOMATED: CPT

## 2023-01-23 PROCEDURE — 84100 ASSAY OF PHOSPHORUS: CPT

## 2023-01-23 NOTE — TELEPHONE ENCOUNTER
DATE:  1/23/2023     TIME OF RECEIPT FROM LAB:  0949    ORDERING PROVIDER: Dr. Delbert Morgan     LAB TEST:  WBC    LAB VALUE:  1.5    RESULTS GIVEN WITH READ-BACK TO (PROVIDER):  Yenni Cristina for Edwina Fallon RN    TIME LAB VALUE REPORTED TO PROVIDER:   1002

## 2023-01-24 ENCOUNTER — TELEPHONE (OUTPATIENT)
Dept: TRANSPLANT | Facility: CLINIC | Age: 53
End: 2023-01-24
Payer: COMMERCIAL

## 2023-01-24 DIAGNOSIS — D70.9 NEUTROPENIA (H): ICD-10-CM

## 2023-01-24 DIAGNOSIS — Z94.4 LIVER TRANSPLANTED (H): Primary | ICD-10-CM

## 2023-01-24 PROBLEM — D72.819 LEUKOPENIA: Status: ACTIVE | Noted: 2022-12-06

## 2023-01-24 PROBLEM — E83.42 HYPOMAGNESEMIA: Status: ACTIVE | Noted: 2022-02-15

## 2023-01-24 PROBLEM — K59.00 CONSTIPATION: Status: ACTIVE | Noted: 2022-11-10

## 2023-01-24 PROBLEM — D84.9 IMMUNOSUPPRESSED STATUS (H): Status: ACTIVE | Noted: 2022-11-08

## 2023-01-24 PROBLEM — R11.0 NAUSEA: Status: ACTIVE | Noted: 2020-08-23

## 2023-01-24 NOTE — DISCHARGE SUMMARY
I evaluated the patient today.  I reviewed the discharge plan with the fellow, and agree with the final assessment and plan for discharge as noted in the discharge summary.  I personally spent  30 minutes or less  today on discharge activities for this patient.      Alexei Alcaraz MD, FRANCISCO JAVIER  Professor of Surgery  [facility] [employer]  Surgical Director of Liver Transplant Program  Executive Medical Director of Pediatric Transplantation   [facility]    Discharge Summary  Transplant Surgery    Date of Admission:  1/17/2023  Date of Discharge:  1/19/2023  8:16 PM  Discharging Provider: Irina Landa PA-C/Dr. Alcaraz  Date of Service (when I saw the patient): 1/19/23    Discharge Diagnoses   Principal Problem:    RUQ abdominal pain  Active Problems:    History of gastric bypass    Nausea    Type 2 diabetes mellitus without complication, without long-term current use of insulin (H)    Hypomagnesemia    Immunosuppressed status (H)    Constipation    Status post liver transplantation (H)    Leukopenia      Procedure/Surgery Information   Procedure:    Surgeon(s): * Surgery not found *       Non-operative procedures None performed     History of Present Illness   Susan Puckett is a 52 year old female w/ PMHx including gastric bypass, CUETO cirrhosis s/p DDLT 11/22, and chronic pancreatic insufficiency 2/2 to pancreatic atrophy presenting to [facility] ED 1/17 with acute on chronic abdominal pain of unclear etiology and mild hyperkalemia. HyperK resolved on recheck  CT Abdomen/pelvis shows large stool burden    Hospital Course     Graft function: Good, LFTs stable.  Immunosuppression management:   Tac 6/5. Goal 8-12. 1/18 tac level was after missed dose last night.   mg BID presumably decreased prior to admission due to leukopenia  Hematology: Acute on chronic anemia: Hgb stable ~8-9  Leukopenia, neutropenia: Likely secondary to meds, possibly  infection. Will check CMV and EBV as an outpatient. Did receive one dose of filgrastim while inpatient  GI/Nutrition:   Constipation: CT scan showing large stool burden, will try more aggressive bowel regimen. Patient states she has been unable to take much PO and is getting IV zofran. Did have some results with larger dose miralax and enemas and was able to tolerate oral intake prior to discharge  Endocrine: sliding scale insulin ordered, has not needed any  Fluid/Electrolytes: MIVF: NS at 125 ml/hr  Hyperkalemia: resolved    Discharge Disposition   Discharged to home   Condition at discharge: Stable    Primary Care Physician   Harleen Billy    Physical Exam                      Vitals:    01/17/23 1735 01/19/23 0201   Weight: 73.5 kg (162 lb) 76.2 kg (168 lb 1.6 oz)     Vital Signs with Ranges     No intake/output data recorded.    Constitutional: Awake, alert, cooperative, no apparent distress, and appears stated age.  Eyes: Lids and lashes normal, pupils equal, round, extra ocular muscles intact, sclera clear, conjunctiva normal.  ENT: Normocephalic, without obvious abnormality, atraumatic  Respiratory: Nonlabored resps on RA  Cardiovascular: Perfused  GI: Soft, non-distended, tenderness improving by the time of discharge  Genitourinary:  Voiding  Skin: No bruising or bleeding, normal skin color, texture  Musculoskeletal: There is no redness, warmth, or swelling of the joints.  Neurologic: Awake, alert, oriented to name, place and time.    Neuropsychiatric: Calm, normal eye contact, alert, normal affect, oriented to self, place, time and situation, memory for past and recent events intact and thought process normal.    Consultations This Hospital Stay   NURSING TO CONSULT FOR VASCULAR ACCESS CARE IP CONSULT  CARE MANAGEMENT / SOCIAL WORK IP CONSULT    Time Spent on this Encounter   I have spent greater than 30 minutes on this discharge.    Discharge Orders   Discharge Medications   Discharge Medication List as  of 1/19/2023  5:30 PM        CONTINUE these medications which have CHANGED    Details   docusate sodium (COLACE) 100 MG capsule Take 1 capsule (100 mg) by mouth 2 times daily, Disp-60 capsule, R-1, No Print Out      polyethylene glycol (MIRALAX) 17 GM/Dose powder Take 34 g by mouth 2 times daily Until having consistent stools with substance., Disp-510 g, R-3, No Print Out      sennosides (SENOKOT) 8.6 MG tablet Take 1-2 tablets by mouth 2 times daily, Disp-60 tablet, R-1, No Print Out           CONTINUE these medications which have NOT CHANGED    Details    magnesium glycinate lysinate chelate (DOCTOR'S BEST) 100 MG TABS tablet Take 2 tablets (200 mg) by mouth 3 times daily, Disp-540 tablet, R-3, E-Prescribe      acetaminophen (TYLENOL) 325 MG tablet Take 3 tablets (975 mg) by mouth every 8 hours as needed for mild pain, Disp-100 tablet, R-0, E-Prescribe      !! amylase-lipase-protease (CREON 24) 27891-77926 units CPEP per EC capsule Take 2 capsules by mouth 3 times daily (with meals), Disp-180 capsule, R-3, E-Prescribe      !! amylase-lipase-protease (CREON 24) 20858-38479 units CPEP per EC capsule Take 1 capsule by mouth Take with snacks or supplements (with snacks), Disp-90 capsule, R-3, E-Prescribe      aspirin (ASA) 81 MG chewable tablet Take 1 tablet (81 mg) by mouth daily, Disp-90 tablet, R-3, E-Prescribe      calcium carbonate-vitamin D (OSCAL) 500-5 MG-MCG tablet Take 1 tablet by mouth 2 times daily, Historical      capsaicin 0.035 % CREA Externally apply topically 3 times dailyHistorical      Continuous Blood Gluc  (DEXCOM G6 ) LUNA Use as directed for continuous glucose monitoring., Historical      Continuous Blood Gluc Sensor (DEXCOM G6 SENSOR) MISC Use as directed for continuous glucose monitoring.  Change sensor every 10 days., Historical      Continuous Blood Gluc Transmit (DEXCOM G6 TRANSMITTER) MISC Use as directed for continuous glucose monitoring.  Change every 3 months.,  Historical      cyanocobalamin (VITAMIN B-12) 1000 MCG tablet Take 1,000 mcg by mouth every 7 days Take 1 tablet by mouth every 7 days on Wednesdays, Historical      cyclobenzaprine (FLEXERIL) 10 MG tablet Take 1 tablet (10 mg) by mouth every 8 hours as needed for muscle spasms, Disp-30 tablet, R-0, E-Prescribe      dapsone (ACZONE) 25 MG tablet Take 2 tablets (50 mg) by mouth daily, Disp-180 tablet, R-1, E-Prescribe      escitalopram (LEXAPRO) 20 MG tablet Take 20 mg by mouth daily, Historical      folic acid (FOLVITE) 1 MG tablet Take 1 mg by mouth every morning, Historical      gabapentin (NEURONTIN) 100 MG capsule Take 2 capsules (200 mg) by mouth 3 times daily, Disp-180 capsule, R-2, E-Prescribe      insulin aspart (NOVOLOG VIAL) 100 UNITS/ML vial Inject 1-10 Units Subcutaneous 4 times daily (with meals and nightly), No Print Out0.5 unit(s) per 14g cho AC breakfast/lunch AND 0.5 unit(s) per 20 g cho for dinner AND NO meal insulin after 2000 hrs.  Snack coverage:   0800 - 1700 - 0.5 unit(s) per 14  g cho and 1701 - 2000 0.5 unit(s) per 20 g cho      insulin glargine (LANTUS PEN) 100 UNIT/ML pen Inject 11 Units Subcutaneous every morning (before breakfast), No Print OutIf Lantus is not covered by insurance, may substitute Basaglar or Semglee or other insulin glargine product per insurance preference at same dose and frequency.        insulin pen needle (BD GRISEL U/F) 32G X 4 MM miscellaneous Inject 1 each SubcutaneousHistorical      levothyroxine (SYNTHROID/LEVOTHROID) 125 MCG tablet Take 125 mcg by mouth daily, Historical      LORazepam (ATIVAN) 0.5 MG tablet Take 0.5 mg by mouth nightly as needed For anxiety, Historical      melatonin 5 MG tablet Take 5 mg by mouth At Bedtime, Historical      multivitamin CF FORMULA (DEKAS PLUS) capsule Take 1 capsule by mouth daily, Disp-90 capsule, R-3, E-Prescribe      mycophenolate (GENERIC EQUIVALENT) 250 MG capsule Take 2 capsules (500 mg) by mouth two times daily,  Disp-540 capsule, R-3, E-Prescribe      ondansetron (ZOFRAN ODT) 8 MG ODT tab Take 1 tablet (8 mg) by mouth every 8 hours as needed for nausea, Disp-30 tablet, R-0, E-Prescribe      prochlorperazine (COMPAZINE) 10 MG tablet Take 0.5 tablets (5 mg) by mouth every 8 hours as needed for nausea or vomiting, Disp-10 tablet, R-0, E-Prescribe      rizatriptan (MAXALT) 10 MG tablet Take 10 mg by mouth as needed Take 1 Tablet (10 mg) by mouth as needed for Headache. at onset of migraine. May repeat in 2 hr if needed up to 30mg in 24 hr & MAX use 5 days/month. Do not use within 24 hours of an ergotamine preparation or a different tri ptan., Historical      sitagliptin (JANUVIA) 100 MG tablet Take 1 tablet (100 mg) by mouth daily, Disp-30 tablet, R-0, E-Prescribe      tacrolimus (GENERIC EQUIVALENT) 0.5 MG capsule Take 1 capsule (0.5 mg) by mouth as needed (For dose changes) Total dose 6 mg AM, 5 mg PM, Disp-90 capsule, R-3, E-PrescribeTXP DT 11/2/2022 (Liver) TXP Dischg DT 11/10/2022 DX Liver replaced by transplant Z94.4 [facility] ([place], [place])      tacrolimus (GENERIC EQUIVALENT) 1 MG capsule Take 1 capsule (1 mg) by mouth every morning Total dose 6 mg AM, 5 mg PM, Disp-90 capsule, R-3, E-PrescribeTXP DT 11/2/2022 (Liver) TXP Dischg DT 11/10/2022 DX Liver replaced by transplant Z94.4 [facility]  ([place], [place])      tacrolimus (GENERIC EQUIVALENT) 5 MG capsule Take 1 capsule (5 mg) by mouth 2 times daily Total dose 6 mg AM, 5 mg PM, Disp-90 capsule, R-3, E-PrescribeTXP DT 11/2/2022 (Liver) TXP Dischg DT 11/10/2022 DX Liver replaced by transplant Z94.4 [facility]  ([place], [place])      topiramate (TOPAMAX) 25 MG tablet Take 3 tablets (75 mg) by mouth At Bedtime, Disp-90 tablet, R-2, E-Prescribe      traZODone (DESYREL) 50 MG tablet Take 50 mg by mouth At Bedtime, Historical        !! - Potential duplicate medications found. Please discuss with provider.        STOP taking these medications       sodium bicarbonate 650 MG tablet Comments:   Reason for Stopping:         valGANciclovir (VALCYTE) 450 MG tablet Comments:   Reason for Stopping:                  Reason for your hospital stay    Patient was admitted for observation due to abdominal pain, found to be constipated. Did have some bowel function with aggressive bowel regimen.     Activity    Your activity upon discharge: activity as tolerated. Walk at least 4 times day     When to contact your care team    Call your transplant coordinator at [phone] if you have any of the following: sustained temperature greater than 100.4 or isolated fever spike to 101.5, increased pain over graft  or difficulty obtaining or taking your transplant medications.    If it is outside of office hours, please call the hospital switchboard at [phone] and ask to have the transplant surgery fellow paged for urgent medical questions.     Adult [facility] Follow-up and recommended labs and tests    Resume transplant labs as previously arranged: CBC with differential, BMP, mag, phos, hepatic panel, and tacrolimus level.  CMV DNA PCR to be drawn ~1/23/23    Resume follow up with primary care, Dr. Cook, and Dr. Morgan as scheduled.    Appointments on [facility] and/or [facility] (with [facility] or [facility] provider or service). Call [phone] if you haven't heard regarding these appointments within 7 days of discharge.     Diet    Follow this diet upon discharge: Regular Diet Adult

## 2023-01-24 NOTE — PROGRESS NOTES
"Adult Diagnostic Assessment Update    [facility]: Integrated Behavioral Health  Provider Name:  Sancho Gaines     Credentials:  Alli, ALEXIS    PATIENT'S NAME: Susan Puckett  PREFERRED NAME: Susan  PRONOUNS: She/Her/Hers  MRN:   [id number]  :   1970   ACCT. NUMBER: [id number]  DATE OF SERVICE: 23  START TIME: 9:10  END TIME: 9:50  PREFERRED PHONE: [phone]  May we leave a program related message: Yes  SERVICE MODALITY:  Video Visit:      Provider verified identity through the following two step process.  Patient provided:  Patient  and Patient is known previously to provider    Telemedicine Visit: The patient's condition can be safely assessed and treated via synchronous audio and visual telemedicine encounter.      Reason for Telemedicine Visit: Services only offered telehealth    Originating Site (Patient Location): Patient's home    Distant Site (Provider Location): Provider Remote Setting- Home Office    Consent:  The patient/guardian has verbally consented to: the potential risks and benefits of telemedicine (video visit) versus in person care; bill my insurance or make self-payment for services provided; and responsibility for payment of non-covered services.     Patient would like the video invitation sent by:  My Chart    Mode of Communication:  Video Conference via [employer]    Distant Location (Provider):  Off-site    As the provider I attest to compliance with applicable laws and regulations related to telemedicine.    Provider reviewed initial DA dated:  2022    Chief Complaint:   The reason for seeking services at this time is: \"I got my liver transplant and there is a lot going on.\"  Patient represents to [facility] services at the recommendation by her care team to discuss recent liver transplant and the circumstances around the donation she is struggling to process. Patient notes they received a liver transplant in 2022 from a "  donor of whom she had a personal connection. Patient speaks to grappling with conflicting feelings of gratitude for the donation, however is grappling with the sense of grief and loss from the donor and for their family. She describes a strong sense of guilt in particular. Concerns about her health and her care at times throughout the transplant process were also discussed/reviewed. The problem(s) began following liver transplant in 2022, though work with this patient suggests history of depressed mood and anxiety. Patient has attempted to resolve these concerns in the past through counseling with this provider.    Answers for HPI/ROS submitted by the patient on 2023  If you checked off any problems, how difficult have these problems made it for you to do your work, take care of things at home, or get along with other people?: Somewhat difficult  PHQ9 TOTAL SCORE: 12        Patient does not want family members involved in their care.    Current Stressors/Losses/Concerns: See above. Matters pertaining to  donor liver transplant, grief/loss concerns.    Current Outpatient Medications   Medication      magnesium glycinate lysinate chelate (DOCTOR'S BEST) 100 MG TABS tablet     acetaminophen (TYLENOL) 325 MG tablet     amylase-lipase-protease (CREON 24) 45749-52819 units CPEP per EC capsule     amylase-lipase-protease (CREON 24) 30443-28570 units CPEP per EC capsule     aspirin (ASA) 81 MG chewable tablet     calcium carbonate-vitamin D (OSCAL) 500-5 MG-MCG tablet     capsaicin 0.035 % CREA     Continuous Blood Gluc  (DEXCOM G6 ) LUNA     Continuous Blood Gluc Sensor (DEXCOM G6 SENSOR) MISC     Continuous Blood Gluc Transmit (DEXCOM G6 TRANSMITTER) MISC     cyanocobalamin (VITAMIN B-12) 1000 MCG tablet     cyclobenzaprine (FLEXERIL) 10 MG tablet     dapsone (ACZONE) 25 MG tablet     docusate sodium (COLACE) 100 MG capsule     escitalopram (LEXAPRO) 20 MG tablet     folic  acid (FOLVITE) 1 MG tablet     gabapentin (NEURONTIN) 100 MG capsule     insulin aspart (NOVOLOG VIAL) 100 UNITS/ML vial     insulin glargine (LANTUS PEN) 100 UNIT/ML pen     insulin pen needle (BD GRISEL U/F) 32G X 4 MM miscellaneous     levothyroxine (SYNTHROID/LEVOTHROID) 125 MCG tablet     LORazepam (ATIVAN) 0.5 MG tablet     melatonin 5 MG tablet     multivitamin CF FORMULA (DEKAS PLUS) capsule     mycophenolate (GENERIC EQUIVALENT) 250 MG capsule     ondansetron (ZOFRAN ODT) 8 MG ODT tab     polyethylene glycol (MIRALAX) 17 GM/Dose powder     prochlorperazine (COMPAZINE) 10 MG tablet     rizatriptan (MAXALT) 10 MG tablet     sennosides (SENOKOT) 8.6 MG tablet     sitagliptin (JANUVIA) 100 MG tablet     tacrolimus (GENERIC EQUIVALENT) 0.5 MG capsule     tacrolimus (GENERIC EQUIVALENT) 1 MG capsule     tacrolimus (GENERIC EQUIVALENT) 5 MG capsule     topiramate (TOPAMAX) 25 MG tablet     traZODone (DESYREL) 50 MG tablet     No current facility-administered medications for this visit.         Medication Adherence:  Patient reports taking prescribed medications as prescribed    Patient Allergies:    Allergies   Allergen Reactions     Methylprednisolone Hives     Per patient, reaction occurred in 1999 or earlier. Broke out in round, flat hives in neck and chest area. No shortness of breath or swelling. Unknown if reaction occurred immediately after administration.      Adhesive Tape Itching     Oxycodone      slurring     Droperidol Anxiety     Nsaids Other (See Comments)     GI bleed.     Prednisone Anxiety, Hives and Rash     Per patient she has tolerated this since       Medical History:    Past Medical History:   Diagnosis Date     Anemia      Anxiety      Cold sore      Depressive disorder      Diabetes (H)     Type 2 DM/No Insulin      Encephalopathy      Esophageal varices without bleeding (H)     grade I     History of blood transfusion     10/2019     History of seizure     diabetic seizure     Insomnia       Liver cirrhosis secondary to CUETO (H) 2020     Migraine      Pleural effusion      Thrombocytopenia (H)      Thyroid disease        Since the initial DA, Susan:  denies changes in her medical history.    denies changes in her living situation.    denies changes in her employment.    denies changes regarding financial concerns or gambling behavior.   denies  changes in education status.   denies ability to meet her basic needs.   Since the initial DA, the Susan denies changes with her relationships/support system.       Significant Losses / Trauma / Abuse / Neglect Issues:   Since the initial DA, Susan reports new losses/trauma/abuse/neglect issues. Received liver transplant though is grappling with th circumstances around the dotation from a  party.     Substance Use History:   Patient does not report use of substances since the initial DA.     Substances Used: NA Last use: NA  Pattern of Use: NA    Based on the negative CAGE score and clinical interview there  are not indications of drug or alcohol abuse.      Current Mental Status Exam:   Appearance:  Appropriate    Eye Contact:  Good   Psychomotor:  Normal       Gait / station:  no problem  Attitude / Demeanor: Cooperative   Speech      Rate / Production: Normal/ Responsive      Volume:  Normal  volume      Language:  intact  Mood:   Depressed  Sad   Affect:   Appropriate    Thought Content: Clear   Thought Process: Logical       Associations: No loosening of associations  Insight:   Good   Judgment:  Intact   Orientation:  All  Attention/concentration: Good    Rating Scales:    PHQ9:    PHQ-9 SCORE 2022   PHQ-9 Total Score MyChart 16 (Moderately severe depression) 13 (Moderate depression) 12 (Moderate depression)   PHQ-9 Total Score 16 13 12   ;    GAD7:    AILIN-7 SCORE 2020 2/10/2021   Total Score 10 (moderate anxiety) 3 (minimal anxiety)   Total Score 10 3       Safety Assessment:  Current Safety Concerns:  [place]  Suicide Severity Rating Scale (Lifetime/Recent)  [place] Suicide Severity Rating (Lifetime/Recent) 1/24/2022   Wish to be Dead (Lifetime) No   Non-Specific Active Suicidal Thoughts (Lifetime) No   Most Severe Ideation Rating (Lifetime) NA   Frequency (Lifetime) NA   Duration (Lifetime) NA   Controllability (Lifetime) NA   Protective Factors  (Lifetime) NA   Reasons for Ideation (Lifetime) NA   RETIRED: 1. Wish to be Dead (Recent) No   RETIRED: 2. Non-Specific Active Suicidal Thoughts (Recent) No   3. Active Suicidal Ideation with any Methods (Not Plan) Without Intent to Act (Lifetime) No   RETIRED: 3. Active Suicidal Ideation with any Methods (Not Plan) Without Intent to Act (Recent) No   RETIRE: 4. Active Suicidal Ideation with Some Intent to Act, Without Specific Plan (Lifetime) No   4. Active Suicidal Ideation with Some Intent to Act, Without Specific Plan (Recent) No   RETIRE: 5. Active Suicidal Ideation with Specific Plan and Intent (Lifetime) No   RETIRED: 5. Active Suicidal Ideation with Specific Plan and Intent (Recent) No   Most Severe Ideation Rating (Past Month) NA   Frequency (Past Month) NA   Duration (Past Month) NA   Controllability (Past Month) NA   Protective Factors (Past Month) NA   Reasons for Ideation (Past Month) NA   Actual Attempt (Lifetime) No   Actual Attempt (Past 3 Months) No   Has subject engaged in non-suicidal self-injurious behavior? (Lifetime) No   Has subject engaged in non-suicidal self-injurious behavior? (Past 3 Months) No   Interrupted Attempts (Lifetime) No   Interrupted Attempts (Past 3 Months) No   Aborted or Self-Interrupted Attempt (Lifetime) No   Aborted or Self-Interrupted Attempt (Past 3 Months) No   Preparatory Acts or Behavior (Lifetime) No   Preparatory Acts or Behavior (Past 3 Months) No   Most Recent Attempt Actual Lethality Code NA   Most Lethal Attempt Actual Lethality Code NA   Initial/First Attempt Actual Lethality Code NA       Patient reports the following  protective factors: spirituality, positive relationships positive social network, forward/future oriented thinking, dedication to family/friends, safe and stable environment, regular sleep, effectively controls impulses, sense of belonging to family and friends, abstinence from substances, adherence with prescribed medication, living with other people, daily obligations, committment to well-being, positive social skills, healthy fear of risky behaviors or pain and access to a variety of clinical interventions    See Preliminary Treatment Plan for Safety and Risk Management Plan      Review of Symptoms per patient report:  Depression: Change in sleep, Lack of interest, Excessive or inappropriate guilt, Change in energy level, Difficulties concentrating, Feelings of helplessness, Low self-worth, Ruminations, Irritability and Feeling sad, down, or depressed  Bernadette:  No Symptoms  Psychosis: No Symptoms  Anxiety: Nervousness, Ruminations and Irritability  Panic:  No symptoms  Post Traumatic Stress Disorder:  Experienced traumatic event Liver transplanted   Eating Disorder: No Symptoms  ADD / ADHD:  No symptoms  Conduct Disorder: No symptoms  Autism Spectrum Disorder: No symptoms  Obsessive Compulsive Disorder: No Symptoms    Patient reports the following compulsive behaviors and treatment history: NA.      Diagnostic Criteria:   Major Depressive Disorder  A) Recurrent episode(s) - symptoms have been present during the same 2-week period and represent a change from previous functioning 5 or more symptoms (required for diagnosis)   - Depressed mood. Note: In children and adolescents, can be irritable mood.     - Diminished interest or pleasure in all, or almost all, activities.    - Decreased sleep.    - Fatigue or loss of energy.    - Feelings of worthlessness or excessive guilt.    - Diminished ability to think or concentrate, or indecisiveness.   B) The symptoms cause clinically significant distress or impairment in  social, occupational, or other important areas of functioning  C) The episode is not attributable to the physiological effects of a substance or to another medical condition  D) The occurence of major depressive episode is not better explained by other thought / psychotic disorders  E) There has never been a manic episode or hypomanic episode      Functional Status:  Patient reports the following functional impairments: health maintenance, relationship(s) and self-care.     WHODAS: No flowsheet data found.  Nonprogrammatic care:  Patient is requesting basic services to address current mental health concerns.      Clinical Summary:  1. Reason for assessment: depressed mood, grief, adjustment to transplant.  2. Psychosocial, Cultural and Contextual Factors: Liver transplanted November 2022.  3. Principal DSM5 Diagnoses  (Sustained by DSM5 Criteria Listed Above):   296.32 (F33.1) Major Depressive Disorder, Recurrent Episode, Moderate _.  4. Other Diagnoses that is relevant to services:   NA  5. Provisional Diagnosis:  NA  6. Prognosis: Relieve Acute Symptoms.  7. Likely consequences of symptoms if not treated: Deterioration of mood and quality of life.  8. Patient's strengths/skills/abilities include:  caring, has a previous history of therapy, insightful, intelligent, motivated, open to learning, open to suggestions / feedback, support of family, friends and providers, wants to learn and willing to ask questions .   9. Patient's resources for support: Family and friends    Recommendations:     1. Plan for Safety and Risk Management:   Recommended that patient call 911 or go to the local ED should there be a change in any of these risk factors..          Report to child / adult protection services was NA.     2. Patient's identified no immediate cultural or diversity factors relevant to care.     3. Initial Treatment will focus on:    Adjustment Difficulties related to: transplant and loss of donor in family  system.     4. Resources/Service Plan:    services are not indicated.   Modifications to assist communication are not indicated.   Additional disability accommodations are not indicated.      5. Collaboration:   Collaboration / coordination of treatment will be initiated with the following  support professionals: SOT care team, as needed.      6.  Referrals:   The following referral(s) will be initiated (list in order of priority or patient preference): None for now; 1:1 counseling is appropriate. Next Scheduled Appointment: 2 weeks.     A Release of Information has been obtained for the following: None.    7.  MACKENZIE:  Discussed Discussed the general effects of drugs and alcohol on health and well-being. Provider gave patient printed information about the effects of chemical use on their health and well being. Recommendations:  Abstinence from alcohol due to transplant status.     8. Records:   They were reviewed at time of assessment.   Information in this assessment was obtained from the medical record and provided by patient who is a good historian.    Patient will have open access to their mental health medical record. Will update treatment plan subsequent session.    Sancho Gaines Psy.D, LP   Behavioral Health Clinician   [facility]

## 2023-01-24 NOTE — TELEPHONE ENCOUNTER
Call returned to Susan Puckett. We reviewed labs. WBC 1.5, RNCC unable to add on differential to 1/23 labs. CBC with diff ordered for Th labs. RNCC encouraged Susan to continue masking if she leaves her house, wash hands and avoid people who are sick. Susan reports viruses were to be checked per inpatient team. RNCC reviewed with liver ANYA, ok to check EBV PCR and CMV PCR with next set of labs. Orders placed.Tac level stable, no dose changes recommended.     Susan also had questions regarding staging of her liver disease. RNCC reviwed liver pathology report, let her know cirrhosis was found from pathology. RNCC asked that she review further questions with provider at upcoming appts. Susan v/u of all recommendations.

## 2023-01-25 LAB — EBV DNA # SPEC NAA+PROBE: NOT DETECTED COPIES/ML

## 2023-01-26 ENCOUNTER — LAB (OUTPATIENT)
Dept: LAB | Facility: CLINIC | Age: 53
End: 2023-01-26
Payer: COMMERCIAL

## 2023-01-26 ENCOUNTER — INFUSION THERAPY VISIT (OUTPATIENT)
Dept: INFUSION THERAPY | Facility: CLINIC | Age: 53
End: 2023-01-26
Payer: COMMERCIAL

## 2023-01-26 ENCOUNTER — TELEPHONE (OUTPATIENT)
Dept: TRANSPLANT | Facility: CLINIC | Age: 53
End: 2023-01-26

## 2023-01-26 ENCOUNTER — TEAM CONFERENCE (OUTPATIENT)
Dept: TRANSPLANT | Facility: CLINIC | Age: 53
End: 2023-01-26

## 2023-01-26 VITALS
OXYGEN SATURATION: 98 % | HEART RATE: 59 BPM | DIASTOLIC BLOOD PRESSURE: 84 MMHG | RESPIRATION RATE: 18 BRPM | TEMPERATURE: 97.8 F | SYSTOLIC BLOOD PRESSURE: 136 MMHG

## 2023-01-26 DIAGNOSIS — D70.9 NEUTROPENIA (H): ICD-10-CM

## 2023-01-26 DIAGNOSIS — Z94.4 LIVER TRANSPLANTED (H): ICD-10-CM

## 2023-01-26 DIAGNOSIS — D70.2 DRUG-INDUCED NEUTROPENIA (H): Primary | ICD-10-CM

## 2023-01-26 DIAGNOSIS — Z94.4 LIVER REPLACED BY TRANSPLANT (H): ICD-10-CM

## 2023-01-26 DIAGNOSIS — Z94.4 STATUS POST LIVER TRANSPLANTATION (H): ICD-10-CM

## 2023-01-26 DIAGNOSIS — D70.2 DRUG-INDUCED NEUTROPENIA (H): ICD-10-CM

## 2023-01-26 DIAGNOSIS — D70.2 OTHER DRUG-INDUCED NEUTROPENIA (H): ICD-10-CM

## 2023-01-26 LAB
ALBUMIN SERPL-MCNC: 2.7 G/DL (ref 3.4–5)
ALP SERPL-CCNC: 71 U/L (ref 40–150)
ALT SERPL W P-5'-P-CCNC: 8 U/L (ref 0–50)
ANION GAP SERPL CALCULATED.3IONS-SCNC: 3 MMOL/L (ref 3–14)
AST SERPL W P-5'-P-CCNC: 13 U/L (ref 0–45)
BASOPHILS # BLD MANUAL: 0 10E3/UL (ref 0–0.2)
BASOPHILS NFR BLD MANUAL: 3 %
BILIRUB DIRECT SERPL-MCNC: 0.1 MG/DL (ref 0–0.2)
BILIRUB SERPL-MCNC: 0.3 MG/DL (ref 0.2–1.3)
BUN SERPL-MCNC: 17 MG/DL (ref 7–30)
CALCIUM SERPL-MCNC: 8.1 MG/DL (ref 8.5–10.1)
CHLORIDE BLD-SCNC: 117 MMOL/L (ref 94–109)
CO2 SERPL-SCNC: 23 MMOL/L (ref 20–32)
CREAT SERPL-MCNC: 1.1 MG/DL (ref 0.52–1.04)
EOSINOPHIL # BLD MANUAL: 0 10E3/UL (ref 0–0.7)
EOSINOPHIL NFR BLD MANUAL: 3 %
ERYTHROCYTE [DISTWIDTH] IN BLOOD BY AUTOMATED COUNT: 13.2 % (ref 10–15)
FRAGMENTS BLD QL SMEAR: SLIGHT
GFR SERPL CREATININE-BSD FRML MDRD: 61 ML/MIN/1.73M2
GLUCOSE BLD-MCNC: 230 MG/DL (ref 70–99)
HCT VFR BLD AUTO: 30.7 % (ref 35–47)
HGB BLD-MCNC: 9.4 G/DL (ref 11.7–15.7)
LYMPHOCYTES # BLD MANUAL: 0.7 10E3/UL (ref 0.8–5.3)
LYMPHOCYTES NFR BLD MANUAL: 50 %
MAGNESIUM SERPL-MCNC: 1.7 MG/DL (ref 1.6–2.3)
MCH RBC QN AUTO: 29.4 PG (ref 26.5–33)
MCHC RBC AUTO-ENTMCNC: 30.6 G/DL (ref 31.5–36.5)
MCV RBC AUTO: 96 FL (ref 78–100)
METAMYELOCYTES # BLD MANUAL: 0 10E3/UL
METAMYELOCYTES NFR BLD MANUAL: 1 %
MONOCYTES # BLD MANUAL: 0.2 10E3/UL (ref 0–1.3)
MONOCYTES NFR BLD MANUAL: 12 %
MYELOCYTES # BLD MANUAL: 0 10E3/UL
MYELOCYTES NFR BLD MANUAL: 2 %
NEUTROPHILS # BLD MANUAL: 0.4 10E3/UL (ref 1.6–8.3)
NEUTROPHILS NFR BLD MANUAL: 29 %
PHOSPHATE SERPL-MCNC: 4.2 MG/DL (ref 2.5–4.5)
PLAT MORPH BLD: ABNORMAL
PLATELET # BLD AUTO: 146 10E3/UL (ref 150–450)
POTASSIUM BLD-SCNC: 5.3 MMOL/L (ref 3.4–5.3)
PROT SERPL-MCNC: 5.5 G/DL (ref 6.8–8.8)
RBC # BLD AUTO: 3.2 10E6/UL (ref 3.8–5.2)
RBC MORPH BLD: ABNORMAL
SODIUM SERPL-SCNC: 143 MMOL/L (ref 133–144)
TACROLIMUS BLD-MCNC: 8.7 UG/L (ref 5–15)
TME LAST DOSE: NORMAL H
TME LAST DOSE: NORMAL H
WBC # BLD AUTO: 1.3 10E3/UL (ref 4–11)

## 2023-01-26 PROCEDURE — 80053 COMPREHEN METABOLIC PANEL: CPT

## 2023-01-26 PROCEDURE — 36415 COLL VENOUS BLD VENIPUNCTURE: CPT

## 2023-01-26 PROCEDURE — 85007 BL SMEAR W/DIFF WBC COUNT: CPT

## 2023-01-26 PROCEDURE — 99207 PR NO CHARGE LOS: CPT

## 2023-01-26 PROCEDURE — 80197 ASSAY OF TACROLIMUS: CPT

## 2023-01-26 PROCEDURE — 82248 BILIRUBIN DIRECT: CPT

## 2023-01-26 PROCEDURE — 85027 COMPLETE CBC AUTOMATED: CPT

## 2023-01-26 PROCEDURE — 83735 ASSAY OF MAGNESIUM: CPT

## 2023-01-26 PROCEDURE — 96372 THER/PROPH/DIAG INJ SC/IM: CPT | Performed by: INTERNAL MEDICINE

## 2023-01-26 PROCEDURE — 84100 ASSAY OF PHOSPHORUS: CPT

## 2023-01-26 RX ORDER — EPINEPHRINE 1 MG/ML
0.3 INJECTION, SOLUTION, CONCENTRATE INTRAVENOUS EVERY 5 MIN PRN
Status: CANCELLED | OUTPATIENT
Start: 2023-01-26

## 2023-01-26 RX ORDER — METHYLPREDNISOLONE SODIUM SUCCINATE 125 MG/2ML
125 INJECTION, POWDER, LYOPHILIZED, FOR SOLUTION INTRAMUSCULAR; INTRAVENOUS
Status: CANCELLED
Start: 2023-01-26

## 2023-01-26 RX ORDER — MEPERIDINE HYDROCHLORIDE 25 MG/ML
25 INJECTION INTRAMUSCULAR; INTRAVENOUS; SUBCUTANEOUS EVERY 30 MIN PRN
Status: CANCELLED | OUTPATIENT
Start: 2023-01-26

## 2023-01-26 RX ORDER — MYCOPHENOLATE MOFETIL 250 MG/1
CAPSULE ORAL
Qty: 540 CAPSULE | Refills: 0 | Status: SHIPPED | OUTPATIENT
Start: 2023-01-26 | End: 2023-03-01

## 2023-01-26 RX ORDER — ALBUTEROL SULFATE 0.83 MG/ML
2.5 SOLUTION RESPIRATORY (INHALATION)
Status: CANCELLED | OUTPATIENT
Start: 2023-01-26

## 2023-01-26 RX ORDER — DIPHENHYDRAMINE HYDROCHLORIDE 50 MG/ML
50 INJECTION INTRAMUSCULAR; INTRAVENOUS
Status: CANCELLED
Start: 2023-01-26

## 2023-01-26 RX ORDER — ALBUTEROL SULFATE 90 UG/1
1-2 AEROSOL, METERED RESPIRATORY (INHALATION)
Status: CANCELLED
Start: 2023-01-26

## 2023-01-26 RX ORDER — EPINEPHRINE 1 MG/ML
0.3 INJECTION, SOLUTION INTRAMUSCULAR; SUBCUTANEOUS EVERY 5 MIN PRN
Status: CANCELLED | OUTPATIENT
Start: 2023-01-26

## 2023-01-26 NOTE — TELEPHONE ENCOUNTER
DATE:  1/26/2023     TIME OF RECEIPT FROM LAB:  0919    ORDERING PROVIDER: Dr. Delbert Morgan    LAB TEST:  WBC   1.3            ANC 0.3      RESULTS GIVEN WITH READ-BACK TO (PROVIDER):Hazel Jean for ROSE Lilly      TIME LAB VALUE REPORTED TO PROVIDER:   0981

## 2023-01-26 NOTE — TELEPHONE ENCOUNTER
Post Liver Transplant Team Conference  Date: 1/26/2023  Transplant Coordinator: Edwina Fallon  Transplant Surgeon: Delbert Morgan      Attendees:  [] Dr. Tejada [] Dr. Burgess [x] Davi Clayton, Pharm D      [x] Dr. Alcaraz [x] Ariana Liao LPN []    [] Dr. Gutierrez [] Shelby Vicente NP []    [] Dr. Benitez [] Edwina Logan NP []    [] Dr. Farias [] Edwina Fallon, RN []       [] Dr. Cook [x] Brandi Nichols RN []       [] Dr. Doroteo Bear [x] Hazel Jean, AJAY []       [] Dr. MARY ANN Morgan [x] Yenni Moe RN []       [] Dr. Thrasher [] Kathy Munoz RN []       [] Dr. Gooden [] Chema Briseno RN []         Verbal Plan Read Back:   - If ANC is below 500 administer Neupogen 300 mcg subcutaneous daily x3.  - OK to initiate Mycophenolate wean.     Current med list states Susna is taking  mg BID. She should adjust according to the following taper;  -  mg BID x 1 week then,   -  mg daily x 1 week then,   - discontinue completely.   - weekly liver tests while weaning off Mycophenolate.       Routed to RN Coordinator   Brandi Nichols RN    Call placed to Susan Puckett RNCC reviewed recommendations with her and spouse Roly. Susan v/u and agreeable to plan. MMF wean instructions also sent via [employer].  Orders placed for Neupogen, MG able to get her in for first appt today.     Marge Chamorro, AJAY

## 2023-01-26 NOTE — PROGRESS NOTES
Infusion Nursing Note:  Susan Puckett presents today for Neupogen.    Patient seen by provider today: No   present during visit today: Not Applicable.    Note: patient states that she has been feeling awful. Generalized body aches,  Weakness and worsening fatigue. She also stated she is concerned about her dropping WBC.     Patient stated she is hoping to have 3 consecutive days of neupogen so when she returns on Monday for lab work she will see the difference.       Intravenous Access:  No Intravenous access needed.    Treatment Conditions:  Lab Results   Component Value Date    HGB 9.4 (L) 01/26/2023    WBC 1.3 (LL) 01/26/2023    ANEU 0.4 (LL) 01/26/2023    ANEUTAUTO 0.7 (L) 01/17/2023     (L) 01/26/2023      Lab Results   Component Value Date     01/26/2023    POTASSIUM 5.3 01/26/2023    MAG 1.7 01/26/2023    CR 1.10 (H) 01/26/2023    MAURI 8.1 (L) 01/26/2023    BILITOTAL 0.3 01/26/2023    ALBUMIN 2.7 (L) 01/26/2023    ALT 8 01/26/2023    AST 13 01/26/2023     Results reviewed, labs MET treatment parameters, ok to proceed with treatment.    Post Infusion Assessment:  Patient tolerated injection without incident.  Site patent and intact, free from redness, edema or discomfort.     Discharge Plan:   Discharge instructions reviewed with: Patient.  Patient and/or family verbalized understanding of discharge instructions and all questions answered.  Patient discharged in stable condition accompanied by: self.  Departure Mode: Ambulatory.      Laya Silva RN

## 2023-01-27 ENCOUNTER — INFUSION THERAPY VISIT (OUTPATIENT)
Dept: INFUSION THERAPY | Facility: CLINIC | Age: 53
End: 2023-01-27
Payer: COMMERCIAL

## 2023-01-27 ENCOUNTER — TELEPHONE (OUTPATIENT)
Dept: TRANSPLANT | Facility: CLINIC | Age: 53
End: 2023-01-27

## 2023-01-27 VITALS
TEMPERATURE: 98.1 F | BODY MASS INDEX: 25.6 KG/M2 | DIASTOLIC BLOOD PRESSURE: 79 MMHG | WEIGHT: 161 LBS | OXYGEN SATURATION: 97 % | RESPIRATION RATE: 16 BRPM | SYSTOLIC BLOOD PRESSURE: 122 MMHG | HEART RATE: 70 BPM

## 2023-01-27 DIAGNOSIS — Z94.4 STATUS POST LIVER TRANSPLANTATION (H): ICD-10-CM

## 2023-01-27 DIAGNOSIS — D70.2 OTHER DRUG-INDUCED NEUTROPENIA (H): ICD-10-CM

## 2023-01-27 DIAGNOSIS — D70.2 DRUG-INDUCED NEUTROPENIA (H): Primary | ICD-10-CM

## 2023-01-27 LAB — CMV DNA SPEC NAA+PROBE-ACNC: NOT DETECTED IU/ML

## 2023-01-27 PROCEDURE — 96372 THER/PROPH/DIAG INJ SC/IM: CPT | Performed by: INTERNAL MEDICINE

## 2023-01-27 PROCEDURE — 99207 PR NO CHARGE LOS: CPT

## 2023-01-27 RX ORDER — METHYLPREDNISOLONE SODIUM SUCCINATE 125 MG/2ML
125 INJECTION, POWDER, LYOPHILIZED, FOR SOLUTION INTRAMUSCULAR; INTRAVENOUS
Status: CANCELLED
Start: 2023-01-27

## 2023-01-27 RX ORDER — DIPHENHYDRAMINE HYDROCHLORIDE 50 MG/ML
50 INJECTION INTRAMUSCULAR; INTRAVENOUS
Status: CANCELLED
Start: 2023-01-27

## 2023-01-27 RX ORDER — ALBUTEROL SULFATE 90 UG/1
1-2 AEROSOL, METERED RESPIRATORY (INHALATION)
Status: CANCELLED
Start: 2023-01-27

## 2023-01-27 RX ORDER — ALBUTEROL SULFATE 0.83 MG/ML
2.5 SOLUTION RESPIRATORY (INHALATION)
Status: CANCELLED | OUTPATIENT
Start: 2023-01-27

## 2023-01-27 RX ORDER — EPINEPHRINE 1 MG/ML
0.3 INJECTION, SOLUTION INTRAMUSCULAR; SUBCUTANEOUS EVERY 5 MIN PRN
Status: CANCELLED | OUTPATIENT
Start: 2023-01-27

## 2023-01-27 RX ORDER — MEPERIDINE HYDROCHLORIDE 25 MG/ML
25 INJECTION INTRAMUSCULAR; INTRAVENOUS; SUBCUTANEOUS EVERY 30 MIN PRN
Status: CANCELLED | OUTPATIENT
Start: 2023-01-27

## 2023-01-27 NOTE — PROGRESS NOTES
Infusion Nursing Note:  Susanlawrence Puckett presents today for Zarxio.    Patient seen by provider today: No   present during visit today: Not Applicable.    Note: Patient denies new medical concerns since yesterday.    Intravenous Access:  No Intravenous access/labs at this visit.    Treatment Conditions:  Not Applicable.    Post Infusion Assessment:  Patient tolerated injection without incident.  Site patent and intact, free from redness, edema or discomfort.     Discharge Plan:   Future appts have been reviewed and crosschecked with appt note and plan.  AVS to patient via [email or web address].  Patient will return 1/28/2023 for next appointment-last injection at [facility]. [facility]   Patient discharged in stable condition accompanied by: .  Departure Mode: Ambulatory.      Jayshree Andino RN

## 2023-01-27 NOTE — TELEPHONE ENCOUNTER
Called Susan in response to her [employer] message regarding her low WBC and that she heard it could be due to mary vein clot, PTLD or her spleen. I informed Susan that low WBC is something that is not uncommon in the initial post transplant period because of the medications that she is taking. As we can continue to decrease doses and discontinue some medications her WBC will improve and stabilize.     Susan is also concerned that she has heard different opinions from different providers regarding the fluid in her abdomen. This is not new and I encouraged her to discuss this as well as her concerns about low WBC with her provider at her upcoming visit. Susan verbalized understanding.

## 2023-01-28 ENCOUNTER — INFUSION THERAPY VISIT (OUTPATIENT)
Dept: INFUSION THERAPY | Facility: CLINIC | Age: 53
End: 2023-01-28
Attending: TRANSPLANT SURGERY
Payer: COMMERCIAL

## 2023-01-28 VITALS
RESPIRATION RATE: 16 BRPM | HEART RATE: 66 BPM | TEMPERATURE: 97.5 F | OXYGEN SATURATION: 99 % | DIASTOLIC BLOOD PRESSURE: 80 MMHG | SYSTOLIC BLOOD PRESSURE: 123 MMHG

## 2023-01-28 DIAGNOSIS — D70.2 DRUG-INDUCED NEUTROPENIA (H): Primary | ICD-10-CM

## 2023-01-28 DIAGNOSIS — Z94.4 STATUS POST LIVER TRANSPLANTATION (H): ICD-10-CM

## 2023-01-28 DIAGNOSIS — D70.2 OTHER DRUG-INDUCED NEUTROPENIA (H): ICD-10-CM

## 2023-01-28 LAB
BASOPHILS # BLD MANUAL: 0 10E3/UL (ref 0–0.2)
BASOPHILS NFR BLD MANUAL: 0 %
BURR CELLS BLD QL SMEAR: SLIGHT
DACRYOCYTES BLD QL SMEAR: SLIGHT
EOSINOPHIL # BLD MANUAL: 0.2 10E3/UL (ref 0–0.7)
EOSINOPHIL NFR BLD MANUAL: 5 %
ERYTHROCYTE [DISTWIDTH] IN BLOOD BY AUTOMATED COUNT: 13.8 % (ref 10–15)
HCT VFR BLD AUTO: 32.6 % (ref 35–47)
HGB BLD-MCNC: 9.9 G/DL (ref 11.7–15.7)
LYMPHOCYTES # BLD MANUAL: 0.9 10E3/UL (ref 0.8–5.3)
LYMPHOCYTES NFR BLD MANUAL: 22 %
MCH RBC QN AUTO: 28.9 PG (ref 26.5–33)
MCHC RBC AUTO-ENTMCNC: 30.4 G/DL (ref 31.5–36.5)
MCV RBC AUTO: 95 FL (ref 78–100)
METAMYELOCYTES # BLD MANUAL: 0.1 10E3/UL
METAMYELOCYTES NFR BLD MANUAL: 2 %
MONOCYTES # BLD MANUAL: 0.4 10E3/UL (ref 0–1.3)
MONOCYTES NFR BLD MANUAL: 10 %
MYELOCYTES # BLD MANUAL: 0.1 10E3/UL
MYELOCYTES NFR BLD MANUAL: 2 %
NEUTROPHILS # BLD MANUAL: 2.3 10E3/UL (ref 1.6–8.3)
NEUTROPHILS NFR BLD MANUAL: 59 %
PLAT MORPH BLD: ABNORMAL
PLATELET # BLD AUTO: 142 10E3/UL (ref 150–450)
RBC # BLD AUTO: 3.42 10E6/UL (ref 3.8–5.2)
RBC MORPH BLD: ABNORMAL
WBC # BLD AUTO: 3.9 10E3/UL (ref 4–11)

## 2023-01-28 PROCEDURE — 96372 THER/PROPH/DIAG INJ SC/IM: CPT | Performed by: TRANSPLANT SURGERY

## 2023-01-28 PROCEDURE — 85007 BL SMEAR W/DIFF WBC COUNT: CPT | Performed by: TRANSPLANT SURGERY

## 2023-01-28 PROCEDURE — 250N000011 HC RX IP 250 OP 636: Performed by: TRANSPLANT SURGERY

## 2023-01-28 PROCEDURE — 85027 COMPLETE CBC AUTOMATED: CPT | Performed by: TRANSPLANT SURGERY

## 2023-01-28 PROCEDURE — 36415 COLL VENOUS BLD VENIPUNCTURE: CPT | Performed by: TRANSPLANT SURGERY

## 2023-01-28 RX ORDER — ALBUTEROL SULFATE 0.83 MG/ML
2.5 SOLUTION RESPIRATORY (INHALATION)
Status: CANCELLED | OUTPATIENT
Start: 2023-01-28

## 2023-01-28 RX ORDER — DIPHENHYDRAMINE HYDROCHLORIDE 50 MG/ML
50 INJECTION INTRAMUSCULAR; INTRAVENOUS
Status: CANCELLED
Start: 2023-01-28

## 2023-01-28 RX ORDER — METHYLPREDNISOLONE SODIUM SUCCINATE 125 MG/2ML
125 INJECTION, POWDER, LYOPHILIZED, FOR SOLUTION INTRAMUSCULAR; INTRAVENOUS
Status: CANCELLED
Start: 2023-01-28

## 2023-01-28 RX ORDER — MEPERIDINE HYDROCHLORIDE 25 MG/ML
25 INJECTION INTRAMUSCULAR; INTRAVENOUS; SUBCUTANEOUS EVERY 30 MIN PRN
Status: CANCELLED | OUTPATIENT
Start: 2023-01-28

## 2023-01-28 RX ORDER — ALBUTEROL SULFATE 90 UG/1
1-2 AEROSOL, METERED RESPIRATORY (INHALATION)
Status: CANCELLED
Start: 2023-01-28

## 2023-01-28 RX ORDER — EPINEPHRINE 1 MG/ML
0.3 INJECTION, SOLUTION INTRAMUSCULAR; SUBCUTANEOUS EVERY 5 MIN PRN
Status: CANCELLED | OUTPATIENT
Start: 2023-01-28

## 2023-01-28 RX ADMIN — FILGRASTIM-SNDZ 300 MCG: 300 INJECTION, SOLUTION INTRAVENOUS; SUBCUTANEOUS at 11:21

## 2023-01-28 NOTE — LETTER
1/28/2023         RE: Susan Puckett  [address] [id number]        Dear Colleague,    Thank you for referring your patient, Susan Puckett, to the [facility]. Please see a copy of my visit note below.    Nursing Note  Susan Puckett [facility]:   Chief Complaint   Patient presents with     Imm/Inj     filgrastim-sndz (ZARXIO) injection     Susan Puckett presents today for Zarxio.    Patient seen by provider today: No   present during visit today: Not Applicable.    Note: Patient denies new medical concerns since yesterday.    Intravenous Access:  No Intravenous access  CBC drawn at this visit.    Treatment Conditions:  Not Applicable.    Post Infusion Assessment:  Patient tolerated injection without incident.  Site patent and intact, free from redness, edema or discomfort.     Discharge Plan:   Future appts have been reviewed and crosschecked with appt note and plan.  AVS to patient via [employer]. Returns to [facility] Monday for labs.   Patient discharged in stable condition accompanied by: .  Departure Mode: Ambulatory.      Mellissa Ruiz RN    Administrations This Visit     filgrastim-sndz (ZARXIO) injection syringe 300 mcg     Admin Date  01/28/2023 Action  Given Dose  300 mcg Route  Subcutaneous Administered By  Mellissa Ruiz RN                /80   Pulse 66   Temp 97.5  F (36.4  C) (Oral)   Resp 16   LMP  (LMP Unknown)   SpO2 99%           Again, thank you for allowing me to participate in the care of your patient.        Sincerely,        Specialty Infusion Nurse

## 2023-01-28 NOTE — PROGRESS NOTES
Nursing Note  Susan Puckett presents [facility] for:   Chief Complaint   Patient presents with     Imm/Inj     filgrastim-sndz (ZARXIO) injection     Susan Puckett presents today for Zarxio.    Patient seen by provider today: No   present during visit today: Not Applicable.    Note: Patient denies new medical concerns since yesterday.    Intravenous Access:  No Intravenous access  CBC drawn at this visit.    Treatment Conditions:  Not Applicable.    Post Infusion Assessment:  Patient tolerated injection without incident.  Site patent and intact, free from redness, edema or discomfort.     Discharge Plan:   Future appts have been reviewed and crosschecked with appt note and plan.  AVS to patient via [employer]. Returns to [facility] Monday for labs.   Patient discharged in stable condition accompanied by: .  Departure Mode: Ambulatory.      Mellissa Ruiz RN    Administrations This Visit     filgrastim-sndz (ZARXIO) injection syringe 300 mcg     Admin Date  01/28/2023 Action  Given Dose  300 mcg Route  Subcutaneous Administered By  Mellissa Ruiz RN                /80   Pulse 66   Temp 97.5  F (36.4  C) (Oral)   Resp 16   LMP  (LMP Unknown)   SpO2 99%

## 2023-01-28 NOTE — LETTER
Date:January 30, 2023      Provider requested that no letter be sent. Do not send.       [facility]

## 2023-01-30 ENCOUNTER — VIRTUAL VISIT (OUTPATIENT)
Dept: BEHAVIORAL HEALTH | Facility: CLINIC | Age: 53
End: 2023-01-30
Attending: INTERNAL MEDICINE
Payer: COMMERCIAL

## 2023-01-30 ENCOUNTER — LAB (OUTPATIENT)
Dept: LAB | Facility: CLINIC | Age: 53
End: 2023-01-30
Payer: COMMERCIAL

## 2023-01-30 DIAGNOSIS — F33.1 MAJOR DEPRESSIVE DISORDER, RECURRENT EPISODE, MODERATE (H): Primary | ICD-10-CM

## 2023-01-30 DIAGNOSIS — Z94.4 LIVER REPLACED BY TRANSPLANT (H): ICD-10-CM

## 2023-01-30 LAB
ALBUMIN SERPL-MCNC: 3 G/DL (ref 3.4–5)
ALP SERPL-CCNC: 98 U/L (ref 40–150)
ALT SERPL W P-5'-P-CCNC: 8 U/L (ref 0–50)
ANION GAP SERPL CALCULATED.3IONS-SCNC: 3 MMOL/L (ref 3–14)
AST SERPL W P-5'-P-CCNC: 25 U/L (ref 0–45)
BILIRUB DIRECT SERPL-MCNC: 0.1 MG/DL (ref 0–0.2)
BILIRUB SERPL-MCNC: 0.3 MG/DL (ref 0.2–1.3)
BUN SERPL-MCNC: 19 MG/DL (ref 7–30)
CALCIUM SERPL-MCNC: 8.5 MG/DL (ref 8.5–10.1)
CHLORIDE BLD-SCNC: 114 MMOL/L (ref 94–109)
CO2 SERPL-SCNC: 24 MMOL/L (ref 20–32)
CREAT SERPL-MCNC: 1.29 MG/DL (ref 0.52–1.04)
ERYTHROCYTE [DISTWIDTH] IN BLOOD BY AUTOMATED COUNT: 14.1 % (ref 10–15)
GFR SERPL CREATININE-BSD FRML MDRD: 50 ML/MIN/1.73M2
GLUCOSE BLD-MCNC: 201 MG/DL (ref 70–99)
HCT VFR BLD AUTO: 34.2 % (ref 35–47)
HGB BLD-MCNC: 10.4 G/DL (ref 11.7–15.7)
MAGNESIUM SERPL-MCNC: 1.7 MG/DL (ref 1.6–2.3)
MCH RBC QN AUTO: 29.1 PG (ref 26.5–33)
MCHC RBC AUTO-ENTMCNC: 30.4 G/DL (ref 31.5–36.5)
MCV RBC AUTO: 96 FL (ref 78–100)
PHOSPHATE SERPL-MCNC: 4.5 MG/DL (ref 2.5–4.5)
PLATELET # BLD AUTO: 158 10E3/UL (ref 150–450)
POTASSIUM BLD-SCNC: 5.5 MMOL/L (ref 3.4–5.3)
PROT SERPL-MCNC: 6.3 G/DL (ref 6.8–8.8)
RBC # BLD AUTO: 3.57 10E6/UL (ref 3.8–5.2)
SODIUM SERPL-SCNC: 141 MMOL/L (ref 133–144)
TACROLIMUS BLD-MCNC: 8.8 UG/L (ref 5–15)
TME LAST DOSE: NORMAL H
TME LAST DOSE: NORMAL H
WBC # BLD AUTO: 3.1 10E3/UL (ref 4–11)

## 2023-01-30 PROCEDURE — 80197 ASSAY OF TACROLIMUS: CPT

## 2023-01-30 PROCEDURE — 83735 ASSAY OF MAGNESIUM: CPT

## 2023-01-30 PROCEDURE — 90837 PSYTX W PT 60 MINUTES: CPT | Mod: 95 | Performed by: PSYCHOLOGIST

## 2023-01-30 PROCEDURE — 80053 COMPREHEN METABOLIC PANEL: CPT

## 2023-01-30 PROCEDURE — 82248 BILIRUBIN DIRECT: CPT

## 2023-01-30 PROCEDURE — 36415 COLL VENOUS BLD VENIPUNCTURE: CPT

## 2023-01-30 PROCEDURE — 84100 ASSAY OF PHOSPHORUS: CPT

## 2023-01-30 PROCEDURE — 85027 COMPLETE CBC AUTOMATED: CPT

## 2023-01-30 ASSESSMENT — PATIENT HEALTH QUESTIONNAIRE - PHQ9
SUM OF ALL RESPONSES TO PHQ QUESTIONS 1-9: 13
SUM OF ALL RESPONSES TO PHQ QUESTIONS 1-9: 13
10. IF YOU CHECKED OFF ANY PROBLEMS, HOW DIFFICULT HAVE THESE PROBLEMS MADE IT FOR YOU TO DO YOUR WORK, TAKE CARE OF THINGS AT HOME, OR GET ALONG WITH OTHER PEOPLE: VERY DIFFICULT

## 2023-01-30 NOTE — PROGRESS NOTES
[facility]: Integrated Behavioral Health  2023        Behavioral Health Clinician Progress Note    Patient Name: Susan Puckett           Service Type: Individual      Service Location:  Video Visit      Session Start Time: 12:30   Session End Time: 1:26      Session Length: 53 - 60      Attendees: Patient    Visit Activities (Refresh list every visit): [facility] Only    Service Modality:  Video Visit:      Provider verified identity through the following two step process.  Patient provided:  Patient  and Patient is known previously to provider    Telemedicine Visit: The patient's condition can be safely assessed and treated via synchronous audio and visual telemedicine encounter.      Reason for Telemedicine Visit: Patient convenience (e.g. access to timely appointments / distance to available provider)    Originating Site (Patient Location): Patient's home    Distant Site (Provider Location): Provider Remote Setting- Home Office    Consent:  The patient/guardian has verbally consented to: the potential risks and benefits of telemedicine (video visit) versus in person care; bill my insurance or make self-payment for services provided; and responsibility for payment of non-covered services.     Patient would like the video invitation sent by:  My Chart    Mode of Communication:  Video Conference via [employer]well    Distant Location (Provider):  Off-site    As the provider I attest to compliance with applicable laws and regulations related to telemedicine.      Diagnostic Assessment Date: Update on 2023  Treatment Plan Review Date: 2023  See Flowsheets for today's PHQ-9 and AILIN-7 results  Previous PHQ-9:   PHQ-9 SCORE 2022   PHQ-9 Total Score MyChart 13 (Moderate depression) 12 (Moderate depression) 13 (Moderate depression)   PHQ-9 Total Score 13 12 13     Previous AILIN-7:   AILIN-7 SCORE 2020 2/10/2021   Total Score 10 (moderate anxiety) 3  (minimal anxiety)   Total Score 10 3       LUIS LEVEL:  LUIS Score (Last Two) 10/18/2020   LUIS Raw Score 35   Activation Score 72.1   LUIS Level 3       DATA  Extended Session (60+ minutes): No  Interactive Complexity: No  Crisis: No    Treatment Objective(s) Addressed in This Session:  Target Behavior(s): disease management/lifestyle changes      Depressed Mood: Decrease frequency and intensity of feeling down, depressed, hopeless  Relationship Problems: will address relationship difficulties in a more adaptive manner  Grief / Loss: will increase understanding of normal grieving process, will engage in effective approach to address and resolve grief/loss issues and will process grief/loss issues in an adaptive manner  Psychological distress related to Chronic Disease Management    Current Stressors / Issues:  Susan completed a [facility] follow-up today. Session today focused primarily on relationship concerns expressed by Susan. She reports struggling with worry about her two nephews who she considers like her own sons. She described the nature of their conflict, noting each are struggling with mental health issues, one of whom has made passive suicidal comments to her in the past. We reflected on Susan's own sense of loss and grief in response to several losses over the years for her, how the conflict with her nephews threatens additional loss for her. Explored and identified a pattern of how she can at times tell people what to do when she herself feels anxious or fearful. Discussed adaptive responses to relationship concerns, such as trying a more indirect, more asking/supportive-approach with others who are struggling with their mental health. Reflected on the possible benefits of this approach for her.       Answers for HPI/ROS submitted by the patient on 6/22/2022  If you checked off any problems, how difficult have these problems made it for you to do your work, take care of things at home, or get along with other  people?: Very difficult  PHQ9 TOTAL SCORE: 13      Answers for HPI/ROS submitted by the patient on 1/30/2023  If you checked off any problems, how difficult have these problems made it for you to do your work, take care of things at home, or get along with other people?: Very difficult  PHQ9 TOTAL SCORE: 13      Progress on Treatment Objective(s) / Homework:  Worsening - ACTION (Actively working towards change); Intervened by reinforcing change plan / affirming steps taken    Supportive and solution-focused counseling emphasized today    Care Plan review completed: Yes    Medication Review:  No changes to current psychiatric medication(s)    Medication Compliance:  Yes    Changes in Health Issues:   Yes: Chronic disease management, Associated Psychological Distress    Chemical Use Review:   Substance Use: Chemical use reviewed, no active concerns identified      Tobacco Use: No current tobacco use.      Assessment: Current Emotional / Mental Status (status of significant symptoms):  Risk status (Self / Other harm or suicidal ideation)  Patient denies a history of suicidal ideation, suicide attempts, self-injurious behavior, homicidal ideation, homicidal behavior and and other safety concerns     Patient denies current fears or concerns for personal safety.  Patient denies current or recent suicidal ideation or behaviors.  Patient denies current or recent homicidal ideation or behaviors.  Patient denies current or recent self injurious behavior or ideation.  Patient denies other safety concerns.     [place] Suicide Severity Rating Scale (Short Version)  [place] Suicide Severity Rating (Short Version) 7/9/2022 11/2/2022 11/10/2022 11/19/2022 11/20/2022 12/4/2022 1/17/2023   Over the past 2 weeks have you felt down, depressed, or hopeless? no no no no no no no   Over the past 2 weeks have you had thoughts of killing yourself? no no no no no no no   Have you ever attempted to kill yourself? no no no no no no no      [place] Suicide Severity Rating Scale (Lifetime/Recent)  [place] Suicide Severity Rating (Lifetime/Recent) 1/24/2022   Wish to be Dead (Lifetime) No   Non-Specific Active Suicidal Thoughts (Lifetime) No   Most Severe Ideation Rating (Lifetime) NA   Frequency (Lifetime) NA   Duration (Lifetime) NA   Controllability (Lifetime) NA   Protective Factors  (Lifetime) NA   Reasons for Ideation (Lifetime) NA   RETIRED: 1. Wish to be Dead (Recent) No   RETIRED: 2. Non-Specific Active Suicidal Thoughts (Recent) No   3. Active Suicidal Ideation with any Methods (Not Plan) Without Intent to Act (Lifetime) No   RETIRED: 3. Active Suicidal Ideation with any Methods (Not Plan) Without Intent to Act (Recent) No   RETIRE: 4. Active Suicidal Ideation with Some Intent to Act, Without Specific Plan (Lifetime) No   4. Active Suicidal Ideation with Some Intent to Act, Without Specific Plan (Recent) No   RETIRE: 5. Active Suicidal Ideation with Specific Plan and Intent (Lifetime) No   RETIRED: 5. Active Suicidal Ideation with Specific Plan and Intent (Recent) No   Most Severe Ideation Rating (Past Month) NA   Frequency (Past Month) NA   Duration (Past Month) NA   Controllability (Past Month) NA   Protective Factors (Past Month) NA   Reasons for Ideation (Past Month) NA   Actual Attempt (Lifetime) No   Actual Attempt (Past 3 Months) No   Has subject engaged in non-suicidal self-injurious behavior? (Lifetime) No   Has subject engaged in non-suicidal self-injurious behavior? (Past 3 Months) No   Interrupted Attempts (Lifetime) No   Interrupted Attempts (Past 3 Months) No   Aborted or Self-Interrupted Attempt (Lifetime) No   Aborted or Self-Interrupted Attempt (Past 3 Months) No   Preparatory Acts or Behavior (Lifetime) No   Preparatory Acts or Behavior (Past 3 Months) No   Most Recent Attempt Actual Lethality Code NA   Most Lethal Attempt Actual Lethality Code NA   Initial/First Attempt Actual Lethality Code NA       A safety and risk  management plan has not been developed at this time, however patient was encouraged to call [place] [phone] should there be a change in any of these risk factors.    Appearance:   Appropriate   Eye Contact:   Good   Psychomotor Behavior: Normal   Attitude:   Cooperative  Friendly Pleasant  Orientation:   All  Speech   Rate / Production: Normal    Volume:  Normal   Mood:    Sad   Affect:    Appropriate  Tearful  Thought Content:  Clear   Thought Form:  Coherent  Logical   Insight:    Good     Diagnoses:  1. Major depressive disorder, recurrent episode, moderate (H)        Collateral Reports Completed:  Not Applicable    Plan: (Homework, other):  Susan agreed to return to counseling in about two weeks. She was also given Cognitive Behavioral Therapy skills to practice when experiencing depression and sleep Hygeine recommendations, including a handout with tips and strategies.  CD Recommendations: No indications of CD issues.     Sancho Gaines LP     ______________________________________________________________________                                              Individual Treatment Plan    Patient's Name: Susan Puckett  YOB: 1970    Date of Creation: 2/1/2022  Date Treatment Plan Last Reviewed/Revised: 1/30/2023    DSM5 Diagnoses: 296.32 (F33.1) Major Depressive Disorder, Recurrent Episode, Moderate _ or 300.02 (F41.1) Generalized Anxiety Disorder  Psychosocial / Contextual Factors: SOT  PROMIS (reviewed every 90 days): IP      Referral / Collaboration:  Referral to another professional/service is not indicated at this time..    Anticipated number of session for this episode of care: 6-8  Anticipation frequency of session: Every other week  Anticipated Duration of each session: 38-52 minutes  Treatment plan will be reviewed in 90 days or when goals have been changed.       MeasurableTreatment Goal(s) related to diagnosis / functional impairment(s)  Goal 1: Patient will experience a reduction in  "depressive symptoms along with a corresponding increase in positive emotion and life satisfaction.    Objective #A (Patient Action)    Patient will Increase interest, engagement, and pleasure in doing things.  Status: Continued - Date(s): 1/30/2023    Intervention(s)  Therapist will help patient identify pleasant and mastery oriented events that elicit positive, relaxed mood.    Objective #B  Patient will Decrease frequency and intensity of feeling down, depressed, hopeless.  Status: Continued - Date(s): 1/30/2023    Intervention(s)  Therapist will introduce patient to cognitive-behavioral and acceptance and commitment therapy topics aimed to help reduce depression and anxiety    Objective #C  Patient will Identify negative self-talk and behaviors: challenge core beliefs, myths, and actions  Improve concentration, focus, and mindfulness in daily activities .  Status: Continued - Date(s): 1/30/2023    Intervention(s)  Therapist will help patient identify and manage negative self-talk and automatic thoughts; introduce patient to cognitive distortions; help patient develop cognitive diffusion techniques      Goal 2: Patient will experience a reduction in anxious symptoms, along with a corresponding increase in relaxed emotional states and life satisfaction.      Objective #A (Patient Action)  Patient will use cognitive-behavioral and thought diffusion strategies identified in therapy to challenge anxious thoughts.    Status: Continued - Date(s): 1/30/2023    Intervention(s)  Therapist will utilize CBT and ACT ideas to help patient challenge anxious thoughts and reduce intensity/duration of anxious distress    Objective #B  Patient will use \"worry time\" each day for 15 minutes of scheduled worry and then defer obsessive or anxious thinking until the next structured worry time.    Status: Continued - Date(s): 1/30/2023    Intervention(s)  Therapist will teach patient how to effectively utilize worry time and/or thought " logs/journals each day and incorporate more relaxation behaviors into their routine.    Objective #C  Patient will identify the stressors which contribute to feelings of anxiety  Patient will increase engagement in adaptive coping skills and recreational activities, such as exercise and healthy socialization, to manage distress.  Status: Continued - Date(s): 6/22/2022    Intervention(s)  Therapist will help patient identify triggers/situational factors that contribute to anxiety and behavioral skills aimed to manage anxious distress.        Other Possible Therapeutic Intervention(s):    Psycho-education regarding mental health diagnoses and treatment options    Supportive Therapy    Provide affirmations, reflections, and establish working rapport    Emphasize and reflect on strength of therapeutic relationship    Skills training    Explore skills useful to client in current situation.    Skills include assertiveness, communication, conflict management, problem-solving, relaxation, etc.    Solution-Focused Therapy    Explore patterns in patient's relationships and discuss options for new behaviors.    Explore patterns in patient's actions and choices and discuss options for new behaviors.    Cognitive-behavioral Therapy    Discuss common cognitive distortions, identify them in patient's life.    Explore ways to challenge, replace, and act against these cognitions.    Acceptance and Commitment Therapy    Explore and identify important values in patient's life.    Discuss ways to commit to behavioral activation around these values.    Psychodynamic psychotherapy    Discuss patient's emotional dynamics and issues and how they impact behaviors.    Explore patient's history of relationships and how they impact present behaviors.    Explore how to work with and make changes in these schemas and patterns.    Narrative Therapy    Explore the patient's story of his/her life from his/her perspective.    Explore alternate ways  of understanding their experience, identifying exceptions, developing new themes.    Interpersonal Psychotherapy    Explore patterns in relationships that are effective or ineffective at helping patient reach their goals, find satisfying experience.    Discuss new patterns or behaviors to engage in for improved social functioning.    Behavioral Activation    Discuss steps patient can take to become more involved in meaningful activity.    Identify barriers to these activities and explore possible solutions.    Mindfulness-Based Strategies    Discuss skills based on development and application of mindfulness.    Skills drawn from compassion-focused therapy, dialectical behavior therapy, mindfulness-based stress reduction, mindfulness-based cognitive therapy, etc.      Patient has reviewed and agreed to the above plan.      Sancho Gaines Psy.D, LP   Behavioral Health Clinician   -[facility]     1/30/2023

## 2023-02-01 ENCOUNTER — TELEPHONE (OUTPATIENT)
Dept: TRANSPLANT | Facility: CLINIC | Age: 53
End: 2023-02-01
Payer: COMMERCIAL

## 2023-02-01 NOTE — TELEPHONE ENCOUNTER
Spoke to Susan. Overall doing well. States she still had aches & pains but has been working with PT, going to the gym and ambulating more. She reports nausea still but is relieved with Zofran PRN. She continues to try and eat small meals and keep up with her fluid intake.     Currently weaning MMF, Valcyte discontinued at 3 months. Per KARYN Tomlinson for patient to transition to hepatology. Appt with Dr Cook 2/7.

## 2023-02-02 ENCOUNTER — TELEPHONE (OUTPATIENT)
Dept: TRANSPLANT | Facility: CLINIC | Age: 53
End: 2023-02-02

## 2023-02-02 ENCOUNTER — LAB (OUTPATIENT)
Dept: LAB | Facility: CLINIC | Age: 53
End: 2023-02-02
Payer: COMMERCIAL

## 2023-02-02 DIAGNOSIS — Z94.4 LIVER REPLACED BY TRANSPLANT (H): Primary | ICD-10-CM

## 2023-02-02 DIAGNOSIS — Z94.4 LIVER REPLACED BY TRANSPLANT (H): ICD-10-CM

## 2023-02-02 DIAGNOSIS — D72.819 LEUKOPENIA, UNSPECIFIED TYPE: ICD-10-CM

## 2023-02-02 DIAGNOSIS — E87.5 HYPERKALEMIA: Primary | ICD-10-CM

## 2023-02-02 LAB
ALBUMIN SERPL-MCNC: 2.7 G/DL (ref 3.4–5)
ALP SERPL-CCNC: 87 U/L (ref 40–150)
ALT SERPL W P-5'-P-CCNC: 15 U/L (ref 0–50)
ANION GAP SERPL CALCULATED.3IONS-SCNC: 4 MMOL/L (ref 3–14)
AST SERPL W P-5'-P-CCNC: 21 U/L (ref 0–45)
BASOPHILS # BLD MANUAL: 0 10E3/UL (ref 0–0.2)
BASOPHILS NFR BLD MANUAL: 2 %
BILIRUB DIRECT SERPL-MCNC: 0.1 MG/DL (ref 0–0.2)
BILIRUB SERPL-MCNC: 0.3 MG/DL (ref 0.2–1.3)
BUN SERPL-MCNC: 19 MG/DL (ref 7–30)
CALCIUM SERPL-MCNC: 8 MG/DL (ref 8.5–10.1)
CHLORIDE BLD-SCNC: 114 MMOL/L (ref 94–109)
CO2 SERPL-SCNC: 23 MMOL/L (ref 20–32)
CREAT SERPL-MCNC: 1.07 MG/DL (ref 0.52–1.04)
EOSINOPHIL # BLD MANUAL: 0.2 10E3/UL (ref 0–0.7)
EOSINOPHIL NFR BLD MANUAL: 9 %
ERYTHROCYTE [DISTWIDTH] IN BLOOD BY AUTOMATED COUNT: 13.6 % (ref 10–15)
GFR SERPL CREATININE-BSD FRML MDRD: 63 ML/MIN/1.73M2
GLUCOSE BLD-MCNC: 238 MG/DL (ref 70–99)
HCT VFR BLD AUTO: 33.1 % (ref 35–47)
HGB BLD-MCNC: 10 G/DL (ref 11.7–15.7)
LYMPHOCYTES # BLD MANUAL: 0.7 10E3/UL (ref 0.8–5.3)
LYMPHOCYTES NFR BLD MANUAL: 30 %
MAGNESIUM SERPL-MCNC: 1.8 MG/DL (ref 1.6–2.3)
MCH RBC QN AUTO: 28.9 PG (ref 26.5–33)
MCHC RBC AUTO-ENTMCNC: 30.2 G/DL (ref 31.5–36.5)
MCV RBC AUTO: 96 FL (ref 78–100)
METAMYELOCYTES # BLD MANUAL: 0.1 10E3/UL
METAMYELOCYTES NFR BLD MANUAL: 3 %
MONOCYTES # BLD MANUAL: 0.2 10E3/UL (ref 0–1.3)
MONOCYTES NFR BLD MANUAL: 9 %
MYELOCYTES # BLD MANUAL: 0 10E3/UL
MYELOCYTES NFR BLD MANUAL: 1 %
NEUTROPHILS # BLD MANUAL: 1 10E3/UL (ref 1.6–8.3)
NEUTROPHILS NFR BLD MANUAL: 46 %
PHOSPHATE SERPL-MCNC: 4.2 MG/DL (ref 2.5–4.5)
PLAT MORPH BLD: ABNORMAL
PLATELET # BLD AUTO: 159 10E3/UL (ref 150–450)
POTASSIUM BLD-SCNC: 5.7 MMOL/L (ref 3.4–5.3)
PROT SERPL-MCNC: 5.8 G/DL (ref 6.8–8.8)
RBC # BLD AUTO: 3.46 10E6/UL (ref 3.8–5.2)
RBC MORPH BLD: ABNORMAL
SODIUM SERPL-SCNC: 141 MMOL/L (ref 133–144)
TACROLIMUS BLD-MCNC: 8.9 UG/L (ref 5–15)
TME LAST DOSE: NORMAL H
TME LAST DOSE: NORMAL H
WBC # BLD AUTO: 2.2 10E3/UL (ref 4–11)
WBC # BLD AUTO: 2.2 10E3/UL (ref 4–11)

## 2023-02-02 PROCEDURE — 80053 COMPREHEN METABOLIC PANEL: CPT

## 2023-02-02 PROCEDURE — 85007 BL SMEAR W/DIFF WBC COUNT: CPT

## 2023-02-02 PROCEDURE — 80197 ASSAY OF TACROLIMUS: CPT

## 2023-02-02 PROCEDURE — 83735 ASSAY OF MAGNESIUM: CPT

## 2023-02-02 PROCEDURE — 82248 BILIRUBIN DIRECT: CPT

## 2023-02-02 PROCEDURE — 84100 ASSAY OF PHOSPHORUS: CPT

## 2023-02-02 PROCEDURE — 85027 COMPLETE CBC AUTOMATED: CPT

## 2023-02-02 PROCEDURE — 36415 COLL VENOUS BLD VENIPUNCTURE: CPT

## 2023-02-02 RX ORDER — SODIUM POLYSTYRENE SULFONATE 15 G/60ML
15 SUSPENSION ORAL; RECTAL ONCE
Qty: 60 ML | Refills: 0 | Status: SHIPPED | OUTPATIENT
Start: 2023-02-02 | End: 2023-02-02

## 2023-02-02 NOTE — TELEPHONE ENCOUNTER
"Reviewed labs with Susan. K elevated at 5.7, instructed patient to refrain from eating foods high in K. Encouraged hydration. WBC 2.2, ANC 1.0, will message Dr Morgan to review/advise.    ADDENDUM:  Per Dr Morgan:  \" she can get a dose of kayexalate and repeat K early next week    I wouldn't do anything else in particular for the WBC\"    Prescription sent to [facility]. Patient aware, will  medication tomorrow morning. Plan for repeat labs Monday.      "

## 2023-02-02 NOTE — TELEPHONE ENCOUNTER
Patient Call: General  Route to LPN    Reason for call: called in regards of having issues getting lab results notification and doesn't see results in my chart. Patient would like find out issue happend and go over lab results. Call back number is [phone].     Call back needed? Yes    Return Call Needed  Same as documented in contacts section  When to return call?: Same day: Route High Priority

## 2023-02-06 ENCOUNTER — TELEPHONE (OUTPATIENT)
Dept: TRANSPLANT | Facility: CLINIC | Age: 53
End: 2023-02-06

## 2023-02-06 ENCOUNTER — LAB (OUTPATIENT)
Dept: LAB | Facility: CLINIC | Age: 53
End: 2023-02-06
Payer: COMMERCIAL

## 2023-02-06 DIAGNOSIS — D72.819 LEUKOPENIA, UNSPECIFIED TYPE: Primary | ICD-10-CM

## 2023-02-06 DIAGNOSIS — D72.819 LEUKOPENIA, UNSPECIFIED TYPE: ICD-10-CM

## 2023-02-06 DIAGNOSIS — Z94.4 LIVER REPLACED BY TRANSPLANT (H): ICD-10-CM

## 2023-02-06 LAB
ALBUMIN SERPL-MCNC: 3 G/DL (ref 3.4–5)
ALP SERPL-CCNC: 82 U/L (ref 40–150)
ALT SERPL W P-5'-P-CCNC: 12 U/L (ref 0–50)
ANION GAP SERPL CALCULATED.3IONS-SCNC: 4 MMOL/L (ref 3–14)
AST SERPL W P-5'-P-CCNC: 16 U/L (ref 0–45)
BASOPHILS # BLD AUTO: 0 10E3/UL (ref 0–0.2)
BASOPHILS NFR BLD AUTO: 1 %
BILIRUB DIRECT SERPL-MCNC: 0.1 MG/DL (ref 0–0.2)
BILIRUB SERPL-MCNC: 0.4 MG/DL (ref 0.2–1.3)
BUN SERPL-MCNC: 20 MG/DL (ref 7–30)
CALCIUM SERPL-MCNC: 8.2 MG/DL (ref 8.5–10.1)
CHLORIDE BLD-SCNC: 114 MMOL/L (ref 94–109)
CO2 SERPL-SCNC: 24 MMOL/L (ref 20–32)
CREAT SERPL-MCNC: 1.18 MG/DL (ref 0.52–1.04)
EOSINOPHIL # BLD AUTO: 0.1 10E3/UL (ref 0–0.7)
EOSINOPHIL NFR BLD AUTO: 7 %
ERYTHROCYTE [DISTWIDTH] IN BLOOD BY AUTOMATED COUNT: 13.6 % (ref 10–15)
GFR SERPL CREATININE-BSD FRML MDRD: 56 ML/MIN/1.73M2
GLUCOSE BLD-MCNC: 84 MG/DL (ref 70–99)
HCT VFR BLD AUTO: 34.4 % (ref 35–47)
HGB BLD-MCNC: 10.5 G/DL (ref 11.7–15.7)
IMM GRANULOCYTES # BLD: 0 10E3/UL
IMM GRANULOCYTES NFR BLD: 1 %
LYMPHOCYTES # BLD AUTO: 0.8 10E3/UL (ref 0.8–5.3)
LYMPHOCYTES NFR BLD AUTO: 38 %
MAGNESIUM SERPL-MCNC: 1.8 MG/DL (ref 1.6–2.3)
MCH RBC QN AUTO: 28.9 PG (ref 26.5–33)
MCHC RBC AUTO-ENTMCNC: 30.5 G/DL (ref 31.5–36.5)
MCV RBC AUTO: 95 FL (ref 78–100)
MONOCYTES # BLD AUTO: 0.2 10E3/UL (ref 0–1.3)
MONOCYTES NFR BLD AUTO: 9 %
NEUTROPHILS # BLD AUTO: 1 10E3/UL (ref 1.6–8.3)
NEUTROPHILS NFR BLD AUTO: 44 %
PHOSPHATE SERPL-MCNC: 4.2 MG/DL (ref 2.5–4.5)
PLATELET # BLD AUTO: 172 10E3/UL (ref 150–450)
POTASSIUM BLD-SCNC: 5.3 MMOL/L (ref 3.4–5.3)
PROT SERPL-MCNC: 6 G/DL (ref 6.8–8.8)
RBC # BLD AUTO: 3.63 10E6/UL (ref 3.8–5.2)
SODIUM SERPL-SCNC: 142 MMOL/L (ref 133–144)
TACROLIMUS BLD-MCNC: 9.9 UG/L (ref 5–15)
TME LAST DOSE: NORMAL H
TME LAST DOSE: NORMAL H
WBC # BLD AUTO: 2 10E3/UL (ref 4–11)
WBC # BLD AUTO: 2 10E3/UL (ref 4–11)

## 2023-02-06 PROCEDURE — 80197 ASSAY OF TACROLIMUS: CPT

## 2023-02-06 PROCEDURE — 36415 COLL VENOUS BLD VENIPUNCTURE: CPT

## 2023-02-06 PROCEDURE — 85025 COMPLETE CBC W/AUTO DIFF WBC: CPT

## 2023-02-06 PROCEDURE — 82248 BILIRUBIN DIRECT: CPT

## 2023-02-06 PROCEDURE — 83735 ASSAY OF MAGNESIUM: CPT

## 2023-02-06 PROCEDURE — 80053 COMPREHEN METABOLIC PANEL: CPT

## 2023-02-06 PROCEDURE — 84100 ASSAY OF PHOSPHORUS: CPT

## 2023-02-06 NOTE — TELEPHONE ENCOUNTER
Spoke to Rivera and informed him that No need, continue at 5 mg AM, 6 mg PM.    However, pt switched it 6mg am, 5mg pm. SHANIQUE.

## 2023-02-06 NOTE — TELEPHONE ENCOUNTER
Spoke to Rivera who states that he has been giving Susan tacro 5 mg am, 6 mg pm for a while. Should Rivera switch it back to 6mg am, 5mg pm?

## 2023-02-07 ENCOUNTER — OFFICE VISIT (OUTPATIENT)
Dept: PHARMACY | Facility: CLINIC | Age: 53
End: 2023-02-07
Payer: COMMERCIAL

## 2023-02-07 ENCOUNTER — TELEPHONE (OUTPATIENT)
Dept: TRANSPLANT | Facility: CLINIC | Age: 53
End: 2023-02-07

## 2023-02-07 ENCOUNTER — OFFICE VISIT (OUTPATIENT)
Dept: GASTROENTEROLOGY | Facility: CLINIC | Age: 53
End: 2023-02-07
Attending: TRANSPLANT SURGERY
Payer: COMMERCIAL

## 2023-02-07 VITALS
DIASTOLIC BLOOD PRESSURE: 74 MMHG | SYSTOLIC BLOOD PRESSURE: 126 MMHG | BODY MASS INDEX: 25.44 KG/M2 | WEIGHT: 160 LBS | HEART RATE: 65 BPM | TEMPERATURE: 97.5 F

## 2023-02-07 DIAGNOSIS — Z94.4 LIVER REPLACED BY TRANSPLANT (H): Primary | ICD-10-CM

## 2023-02-07 DIAGNOSIS — Z94.4 LIVER REPLACED BY TRANSPLANT (H): ICD-10-CM

## 2023-02-07 DIAGNOSIS — K86.89 PANCREATIC INSUFFICIENCY: ICD-10-CM

## 2023-02-07 DIAGNOSIS — K59.00 CONSTIPATION, UNSPECIFIED CONSTIPATION TYPE: ICD-10-CM

## 2023-02-07 DIAGNOSIS — G47.00 INSOMNIA, UNSPECIFIED TYPE: ICD-10-CM

## 2023-02-07 DIAGNOSIS — F32.A DEPRESSION, UNSPECIFIED DEPRESSION TYPE: ICD-10-CM

## 2023-02-07 DIAGNOSIS — G62.9 NEUROPATHY: ICD-10-CM

## 2023-02-07 DIAGNOSIS — G43.809 OTHER MIGRAINE WITHOUT STATUS MIGRAINOSUS, NOT INTRACTABLE: ICD-10-CM

## 2023-02-07 DIAGNOSIS — E11.9 TYPE 2 DIABETES MELLITUS WITHOUT COMPLICATION, WITHOUT LONG-TERM CURRENT USE OF INSULIN (H): ICD-10-CM

## 2023-02-07 PROCEDURE — 99207 PR NO CHARGE LOS: CPT | Performed by: PHARMACIST

## 2023-02-07 PROCEDURE — 99215 OFFICE O/P EST HI 40 MIN: CPT | Mod: GC | Performed by: INTERNAL MEDICINE

## 2023-02-07 PROCEDURE — G0463 HOSPITAL OUTPT CLINIC VISIT: HCPCS | Performed by: INTERNAL MEDICINE

## 2023-02-07 RX ORDER — TACROLIMUS 5 MG/1
5 CAPSULE ORAL EVERY 12 HOURS
Qty: 180 CAPSULE | Refills: 3 | Status: SHIPPED | OUTPATIENT
Start: 2023-02-07 | End: 2023-02-14

## 2023-02-07 RX ORDER — TOPIRAMATE 50 MG/1
100 TABLET, FILM COATED ORAL AT BEDTIME
Status: ON HOLD | COMMUNITY
End: 2024-03-04

## 2023-02-07 RX ORDER — SUMATRIPTAN 20 MG/1
1 SPRAY NASAL PRN
COMMUNITY
End: 2023-12-18

## 2023-02-07 ASSESSMENT — PAIN SCALES - GENERAL: PAINLEVEL: NO PAIN (0)

## 2023-02-07 NOTE — Clinical Note
FYI, continuing Mycophenolate Mofetil taper until 2/13 as patient has not yet reduced to 250mg once daily. Patient will stop on 2/14.

## 2023-02-07 NOTE — PROGRESS NOTES
Medication Therapy Management (MTM) Encounter    ASSESSMENT:                            Medication Adherence/Access: No issues identified    Liver Transplant:  magnesium levels well controlled currently, will reduce Magnesium. Patient has not followed her Mycophenolate Mofetil taper correctly, instead of reducing to 250mg once daily last week, she has continued twice daily dosing. She will reduce to 250mg once daily today and continue until 2/13, then stop.     Type 2 Diabetes:  Managed outside of system, no changes by MTM. Follow-up appt with endocrine is coming up per patient.     Pancreatic insufficiency: Stable.      Constipation: Having loose stools, but was recently in the hospital due to large stool burden. Recommended she follow-up with [facility] at [facility] for discussion.     Migraines: Stable.     Depression/ Anxiety:  See insomnia assessment.    Insomnia: Discussed Lorazepam causes dependency and memory issues with long term use. Patient states dementia runs in her family, so benzodiazepines may not be a good choice for her long term. Recommended she start using PRN rather than nightly and follow-up with PCP for discussion.     Nerve pain: Stable.     PLAN:                            1. Reduce Mycophenolate Mofetil 1 capsule (250 mg) daily for 7 days, Last dose on 2/13.  2. Follow-up with [facility] to discuss your loose stools and your laxatives (docusate, Senna, Miralax).   3. Follow-up with JUSTYNA Burch, about long term Lorazepam use. This can affect Memory long term and make you dependant on it for sleep.  Start using lorazepam as needed for now.   4. Reduce high absorption magnesium to 2 tablets (200mg) twice daily.    Txp team...  1. Patient states Dapsone was reduced to 25mg once daily, I do not see this in the chart. I just want to confirm.     Follow-up: 5/10    SUBJECTIVE/OBJECTIVE:                          Susan Puckett is a 52 year old female coming in for a transitions of care  visit. She was discharged from [facility] on 1/19 for RUQ abdominal pain due to large stool burden. Patient was accompanied by Rivera.     Reason for visit: 3 months post transplant.    Allergies/ADRs: Reviewed in chart  Past Medical History: Reviewed in chart  Tobacco: She reports that she has never smoked. She has never used smokeless tobacco.  Alcohol: not currently using  CBD/THC: none    Medication Adherence/Access: Per patient, misses medication 0 times per week. Rivera sets up medications for her.     Liver Transplant:  Current immunosuppressants include Tacrolimus 5mg twice daily and Mycophenolate Mofetil 250mg twice daily, has not decreased to 250mg daily yet.  Pt reports loose stools, mild tremors.  Transplant date: 11/2/22  Estimated Creatinine Clearance: 58.8 mL/min (A) (based on SCr of 1.18 mg/dL (H)).  CMV prophylaxis: Stopped   PJP prophylaxis: Dapsone 25mg daily for 6 months post txp  Vascular prophylaxis: Aspirin 81mg daily. Denies gastrointestinal bleed.   PPI use: stopped.   Supplements: Magnesium Glucinate 200mg 3 TIMES DAILY, Calcium carbonate/D twice daily , B12 weekly, Folic acid 1mg daily  Vascular prophylaxis: aspirin 81mg daily, denies gastrointestinal bleed sx.   Tx Coordinator: Stephie Lilly MD: Dr. Vizcaino, Using Med Card: Yes  Immunizations: annual flu shot 2022; [id number]:  2019; Prevnar 13/15/20: unknown; TDaP:  10/2012; Shingrix: unknown, HBV: no immunity, COVID: [employer] x3  Lab Results   Component Value Date    MAG 1.8 02/06/2023    MAG 1.8 02/02/2023    MAG 1.7 01/30/2023    MAG 1.7 01/26/2023    MAG 1.8 01/23/2023    MAG 1.8 01/19/2023    MAG 1.6 (L) 01/18/2023     Hemoglobin   Date Value Ref Range Status   02/06/2023 10.5 (L) 11.7 - 15.7 g/dL Final   07/05/2021 11.5 (L) 11.7 - 15.7 g/dL Final   ]  Type 2 Diabetes:  Currently taking Januvia 100mg daily, Lantus 7 units once daily, Novolog sliding scale BG is over 200. Patient is not experiencing side effects.  Blood sugar  monitoring: Continuous Glucose Monitor. Ranges (patient reported):   Symptoms of low blood sugar? Went low 4x yesterday, gets shaky/ hot. Was down to 58.   Symptoms of high blood sugar? none  Lab Results   Component Value Date    A1C 5.7 11/02/2022    A1C 6.2 11/01/2022    A1C 7.3 07/09/2022    A1C 5.1 02/18/2022    A1C 5.6 01/10/2022     Pancreatic insufficiency: Pt is taking Creon 24k-76k 2 capsules with meals and 1 capsule with snacks. Been taking for a few years. She has been having loose stools, 2-4 x daily.     Constipation: Pt is taking Docusate 100mg twice daily, Senna 1 tablet twice daily, Miralax 17g twice daily. Having 2-4x loose stools. Was recently in the hospital for abdominal pain due to large stool burden.     Migraines: Pt has started Sumatriptan nasal spray 20mg as needed, topiramate 100mg at bedtime for 2 weeks, then increasing 125mg at bedtime. Having 5 migraines a month, last a few hours. Maxalt made her Dry heave/ nauseous. Sumatriptan did not cause this problem in the past.     Depression/ Anxiety:  Current medications include: Escitalopram 30mg once daily, Lorazepam 0.5mg at bedtime. Patient reports that depression symptoms are controlled, but concerned that it makes her a bit low affect.   PHQ-9 SCORE 6/22/2022 1/20/2023 1/30/2023   PHQ-9 Total Score MyChart 13 (Moderate depression) 12 (Moderate depression) 13 (Moderate depression)   PHQ-9 Total Score 13 12 13     Insomnia: Current medications include: trazodone 50mg at bedtime, Lorazepam 0.5mg at bedtime, Melatonin 5mg at bedtime. Patient reports no issues at this time.     Nerve pain: Pt has numbness on thigh, right leg. Patient has a lot of pain in her arms as well (predates transplant) worse when she contorts her body to reach for things. Patient is taking Gabapentin 200mg 3 TIMES DAILY.     Today's Vitals: LMP  (LMP Unknown)   ----------------  Post Discharge Medication Reconciliation Status: discharge medications reconciled and  changed, per note/orders.    I spent 60 minutes with this patient today. All changes were made via collaborative practice agreement with Dr. Cook. A copy of the visit note was provided to the patient's provider(s).    A summary of these recommendations was given to the patient.    Davi Clayton, PharmD  [facility] Pharmacist    Phone: [phone]      Medication Therapy Recommendations  Constipation    Current Medication: sennosides (SENOKOT) 8.6 MG tablet   Rationale: Dose too high - Dosage too high - Safety   Recommendation: Referral to Service    Status: Patient Agreed - Adherence/Education         Insomnia    Current Medication: LORazepam (ATIVAN) 0.5 MG tablet   Rationale: Unsafe medication for the patient - Adverse medication event - Safety   Recommendation: Make Appt with PCP   Status: Patient Agreed - Adherence/Education         Liver replaced by transplant (H)    Current Medication:  magnesium glycinate lysinate chelate (DOCTOR'S BEST) 100 MG TABS tablet (Discontinued)   Rationale: Dose too high - Dosage too high - Safety   Recommendation: Decrease Frequency   Status: Accepted per CPA          Current Medication: mycophenolate (GENERIC EQUIVALENT) 250 MG capsule   Rationale: Does not understand instructions - Adherence - Adherence   Recommendation: Provide Adherence Intervention   Status: Patient Agreed - Adherence/Education

## 2023-02-07 NOTE — LETTER
2/7/2023         RE: Susan Puckett  [address] [facility] [address]        Dear Colleague,    Thank you for referring your patient, Susan Puckett, to the [facility] HEPATOLOGY [facility] [place]. Please see a copy of my visit note below.    [facility] Hepatology    Referring provider:  Delbert Morgan  Chief complaint:  Transplant F/U    Assessment  52 year old female with pmhx significant for CUETO s/p DBD liver transplant 11/2/2022, course has been complicated by some wound healing/fluid collections thought 2/2 sutures which has since resolved, as well as some intermittent hyperkalemia, LFT's look good, albumin improving, Cr. Relatively stable.         Plan    -- Agree to 5mg BID Tacro dose decrease  -- Okay to get COVID booster, PNA, and Shingles, would recommend shingles vaccine by itself to minimize side effects.  -Encourage PO intake  -Return to clinic in 3 months.      RTC 3 months    Discussed with attending hepatologist, Dr. Joey Moss MD  PGY-3    The patient was seen and examined.  The above assessment and plan was developed jointly with the fellow.      I did spend total of 40 minutes (on the date of the encounter), including 30 minutes of face-to-face clinic time including counseling. The rest of the time was spent in documentation and review of records.     Thank you very much for allowing me to participate in the care of this patient.  If you have any questions regarding recommendations, please do not hesitate to contact me.         Raphael Cook MD      Professor of Medicine  [employer]      Executive Medical Director, Solid Organ Transplant Program  [facility]  HPI:  52 year old female with pmhx significant for CUETO s/p DBD liver transplant 11/2/2022, course has been complicated by some wound healing/fluid collections thought 2/2 sutures which has since resolved, as well as some intermittent  hyperkalemia.    Still having nausea, decreased PO intake because of this, however nausea well controlled with PRN zofran.    Has been taken off Valcyte by transplant, both patient and donor CMV+     Cr. Elevated but stable.      Denies jaundice, abdominal distension, lower extremity edema, lethargy or confusion.    Denies melena, hematemesis or hematochezia.    Denies fevers, sweats, chills or weight loss.    Medical hx Surgical hx   Past Medical History:   Diagnosis Date     Anemia      Anxiety      Cold sore      Depressive disorder      Diabetes (H)     Type 2 DM/No Insulin      Encephalopathy      Esophageal varices without bleeding (H)     grade I     History of blood transfusion     10/2019     History of seizure     diabetic seizure     Insomnia      Liver cirrhosis secondary to CUETO (H) 2020     Migraine      Pleural effusion      Thrombocytopenia (H)      Thyroid disease       Past Surgical History:   Procedure Laterality Date     APPENDECTOMY           BENCH LIVER N/A 2022    Procedure: Bench liver;  Surgeon: Delbert Morgan MD;  Location: [facility] OR     COLONOSCOPY      2020 at [facility]      ENT SURGERY      tempanoplasty     GI SURGERY      EGD 2020 at [facility]      GYN SURGERY      laparoscopic ablation     IR TRANSVEN INTRAHEPATIC PORTOSYST SHUNT  2021     MAMMOPLASTY REDUCTION BILATERAL       MA STAB PHELBECTOMY VARICOSE VEINS, LESS THAN 10 INCISIONS, ONE EXTREMITY       RNY gastric bypass  2006    retoocolic, retrogastric, [facility]     TONSILLECTOMY & ADENOIDECTOMY Bilateral      TRANSPLANT LIVER RECIPIENT  DONOR N/A 2022    Procedure: TRANSPLANT, LIVER, RECIPIENT,  DONOR;  Surgeon: Delbert Morgan MD;  Location: [facility] OR     WISDOM TEETH EXTRACTION            Medications  Current Outpatient Medications   Medication Sig Dispense Refill      magnesium glycinate lysinate chelate (DOCTOR'S BEST) 100 MG TABS tablet Take 2 tablets  (200 mg) by mouth 3 times daily 540 tablet 3     acetaminophen (TYLENOL) 325 MG tablet Take 3 tablets (975 mg) by mouth every 8 hours as needed for mild pain 100 tablet 0     amylase-lipase-protease (CREON 24) 17892-23907 units CPEP per EC capsule Take 2 capsules by mouth 3 times daily (with meals) 180 capsule 3     amylase-lipase-protease (CREON 24) 05311-68169 units CPEP per EC capsule Take 1 capsule by mouth Take with snacks or supplements (with snacks) 90 capsule 3     aspirin (ASA) 81 MG chewable tablet Take 1 tablet (81 mg) by mouth daily 90 tablet 3     calcium carbonate-vitamin D (OSCAL) 500-5 MG-MCG tablet Take 1 tablet by mouth 2 times daily       capsaicin 0.035 % CREA Externally apply topically 3 times daily       Continuous Blood Gluc  (DEXCOM G6 ) LUNA Use as directed for continuous glucose monitoring.       Continuous Blood Gluc Sensor (DEXCOM G6 SENSOR) MISC Use as directed for continuous glucose monitoring.  Change sensor every 10 days.       Continuous Blood Gluc Transmit (DEXCOM G6 TRANSMITTER) MISC Use as directed for continuous glucose monitoring.  Change every 3 months.       cyanocobalamin (VITAMIN B-12) 1000 MCG tablet Take 1,000 mcg by mouth every 7 days Take 1 tablet by mouth every 7 days on Wednesdays       cyclobenzaprine (FLEXERIL) 10 MG tablet Take 1 tablet (10 mg) by mouth every 8 hours as needed for muscle spasms 30 tablet 0     dapsone (ACZONE) 25 MG tablet Take 2 tablets (50 mg) by mouth daily 180 tablet 1     docusate sodium (COLACE) 100 MG capsule Take 1 capsule (100 mg) by mouth 2 times daily 60 capsule 1     escitalopram (LEXAPRO) 20 MG tablet Take 20 mg by mouth daily       folic acid (FOLVITE) 1 MG tablet Take 1 mg by mouth every morning       gabapentin (NEURONTIN) 100 MG capsule Take 2 capsules (200 mg) by mouth 3 times daily 180 capsule 2     insulin aspart (NOVOLOG VIAL) 100 UNITS/ML vial Inject 1-10 Units Subcutaneous 4 times daily (with meals and nightly)        insulin glargine (LANTUS PEN) 100 UNIT/ML pen Inject 11 Units Subcutaneous every morning (before breakfast)       insulin pen needle (BD GRISEL U/F) 32G X 4 MM miscellaneous Inject 1 each Subcutaneous       levothyroxine (SYNTHROID/LEVOTHROID) 125 MCG tablet Take 125 mcg by mouth daily       LORazepam (ATIVAN) 0.5 MG tablet Take 0.5 mg by mouth nightly as needed For anxiety       melatonin 5 MG tablet Take 5 mg by mouth At Bedtime       multivitamin CF FORMULA (DEKAS PLUS) capsule Take 1 capsule by mouth daily 90 capsule 3     mycophenolate (GENERIC EQUIVALENT) 250 MG capsule Take 1 capsule (250 mg) by mouth every 12 hours for 7 days, THEN 1 capsule (250 mg) daily for 7 days. Then discontinue completely. 540 capsule 0     ondansetron (ZOFRAN ODT) 8 MG ODT tab Take 1 tablet (8 mg) by mouth every 8 hours as needed for nausea 30 tablet 0     polyethylene glycol (MIRALAX) 17 GM/Dose powder Take 34 g by mouth 2 times daily Until having consistent stools with substance. 510 g 3     prochlorperazine (COMPAZINE) 10 MG tablet Take 0.5 tablets (5 mg) by mouth every 8 hours as needed for nausea or vomiting 10 tablet 0     rizatriptan (MAXALT) 10 MG tablet Take 10 mg by mouth as needed Take 1 Tablet (10 mg) by mouth as needed for Headache. at onset of migraine. May repeat in 2 hr if needed up to 30mg in 24 hr & MAX use 5 days/month. Do not use within 24 hours of an ergotamine preparation or a different triptan.       sennosides (SENOKOT) 8.6 MG tablet Take 1-2 tablets by mouth 2 times daily 60 tablet 1     sitagliptin (JANUVIA) 100 MG tablet Take 1 tablet (100 mg) by mouth daily 30 tablet 0     tacrolimus (GENERIC EQUIVALENT) 0.5 MG capsule Take 1 capsule (0.5 mg) by mouth as needed (For dose changes) Total dose 6 mg AM, 5 mg PM 90 capsule 3     tacrolimus (GENERIC EQUIVALENT) 1 MG capsule Take 1 capsule (1 mg) by mouth every morning Total dose 6 mg AM, 5 mg PM 90 capsule 3     tacrolimus (GENERIC EQUIVALENT) 5 MG capsule  Take 1 capsule (5 mg) by mouth 2 times daily Total dose 6 mg AM, 5 mg PM 90 capsule 3     topiramate (TOPAMAX) 25 MG tablet Take 3 tablets (75 mg) by mouth At Bedtime 90 tablet 2     traZODone (DESYREL) 50 MG tablet Take 50 mg by mouth At Bedtime         Allergies  Allergies   Allergen Reactions     Methylprednisolone Hives     Per patient, reaction occurred in 1999 or earlier. Broke out in round, flat hives in neck and chest area. No shortness of breath or swelling. Unknown if reaction occurred immediately after administration.      Adhesive Tape Itching     Oxycodone      slurring     Droperidol Anxiety     Nsaids Other (See Comments)     GI bleed.     Prednisone Anxiety, Hives and Rash     Per patient she has tolerated this since       Family hx Social hx   Family History   Problem Relation Age of Onset     Anesthesia Reaction Nephew         PONV     Bleeding Disorder No family hx of      Clotting Disorder No family hx of       Social History     Tobacco Use     Smoking status: Never     Smokeless tobacco: Never   Substance Use Topics     Alcohol use: Not Currently     Comment: Last drink was in 2017     Drug use: Never        Review of systems  A 10-point review of systems was negative.    Examination  /74   Pulse 65   Temp 97.5  F (36.4  C)   Wt 72.6 kg (160 lb)   LMP  (LMP Unknown)   BMI 25.44 kg/m    Body mass index is 25.44 kg/m .    Gen-NAD  Eye- EOMI  ENT- MMM, normal oropharynx  CVS- RRR, no murmurs  RS- CTA bilaterally  Abd- Soft, NT/ND, incisions well healed, no drainage or erythema. +BS.  Extr- 2+ radial pulses bilaterally, no lower extremity edema bilaterally  MS- hands without clubbing  Neuro- A+Ox3, no asterixis  Skin- no rash or jaundice  Psych- normal mood    Laboratory  BMP  Recent Labs   Lab Test 02/06/23  0904 02/02/23  0908 01/30/23  0859 01/26/23  0858    141 141 143   POTASSIUM 5.3 5.7* 5.5* 5.3   CHLORIDE 114* 114* 114* 117*   MAURI 8.2* 8.0* 8.5 8.1*   CO2 24 23 24 23   BUN  20 19 19 17   CR 1.18* 1.07* 1.29* 1.10*   GLC 84 238* 201* 230*     CBC  Recent Labs   Lab Test 02/06/23  0904 02/02/23  0908 01/30/23  0859 01/28/23  1120   WBC 2.0*  2.0* 2.2*  2.2* 3.1* 3.9*   RBC 3.63* 3.46* 3.57* 3.42*   HGB 10.5* 10.0* 10.4* 9.9*   HCT 34.4* 33.1* 34.2* 32.6*   MCV 95 96 96 95   MCH 28.9 28.9 29.1 28.9   MCHC 30.5* 30.2* 30.4* 30.4*   RDW 13.6 13.6 14.1 13.8    159 158 142*     Liver Enzymes   Recent Labs   Lab Test 02/06/23  0904   PROTTOTAL 6.0*   ALBUMIN 3.0*   BILITOTAL 0.4   ALKPHOS 82   AST 16   ALT 12      INR   INR   Date Value Ref Range Status   11/04/2022 1.32 (H) 0.85 - 1.15 Final   07/05/2021 1.18 (H) 0.86 - 1.14 Final     INR (External)   Date Value Ref Range Status   09/15/2022 1.8 (H) 0.9 - 1.1 Final         Viral Hepatitis  Hepatitis A No results found for: HAAB, HAABM    Hepatitis B   Lab Results   Component Value Date    HBCAB Nonreactive 11/01/2022    AUSAB 1.40 11/01/2022       Hepatitis C   Lab Results   Component Value Date    HCVAB Nonreactive 11/01/2022       Iron Studies  Lab Results   Component Value Date    IRON 58 09/15/2022     (L) 09/15/2022     No results found for: , HH63D    Alpha 1 Antitrypsin No results found for: U8MHCLZK, A1AP    Anti Mitochondrial Antibody No results found for: MITABY    Anti Smooth Muscle Antibody No results found for: [id number], SMOAB    Anti Nuclear Antibody No results found for: HAZEL  Immunoglobulins No results found for: [id number], [id number]    Celiac No results found for: TTGG, TTG, IGA     PHQ:   PHQ 1/30/2023   PHQ-9 Total Score 13   Q9: Thoughts of better off dead/self-harm past 2 weeks Not at all        Radiology    Endoscopy        Again, thank you for allowing me to participate in the care of your patient.        Sincerely,        Raphael Cook MD

## 2023-02-07 NOTE — PATIENT INSTRUCTIONS
"Recommendations from today's MTM visit:                                                       1. Reduce Mycophenolate Mofetil 1 capsule (250 mg) daily for 7 days, Last dose on 2/13.  2. Follow-up with Kaycee CHURCHILL to discuss your loose stools and your laxatives (docusate, Senna, Miralax).   3. Follow-up with JUSTYNA Burch, about long term Lorazepam use. This can affect Memory long term and make you dependant on it for sleep.  Start using lorazepam as needed for now.   4. Reduce high absorption magnesium to 2 tablets (200mg) twice daily.    Follow-up: 5/10 at 11 AM over telephone    It was great speaking with you today.  I value your experience and would be very thankful for your time in providing feedback in our clinic survey. In the next few days, you may receive an email or text message from [employer] with a link to a survey related to your  clinical pharmacist.\"     To schedule another MTM appointment, please call the clinic directly or you may call the MTM scheduling line at [phone] or toll-free at [phone].     My Clinical Pharmacist's contact information:                                                      Please feel free to contact me with any questions or concerns you have.      Davi Clayton, [place]  MTM Pharmacist    Phone: [phone]     "

## 2023-02-07 NOTE — PROGRESS NOTES
Called to inform pt of -pap +hpv. Appt has eric scheduled for colpo. Pt agreed to date and time.   [facility] Hepatology    Referring provider:  Delbert Morgan  Chief complaint:  Transplant F/U    Assessment  52 year old female with pmhx significant for CUETO s/p DBD liver transplant 11/2/2022, course has been complicated by some wound healing/fluid collections thought 2/2 sutures which has since resolved, as well as some intermittent hyperkalemia, LFT's look good, albumin improving, Cr. Relatively stable.         Plan    -- Agree to 5mg BID Tacro dose decrease  -- Okay to get COVID booster, PNA, and Shingles, would recommend shingles vaccine by itself to minimize side effects.  -Encourage PO intake  -Return to clinic in 3 months.      RTC 3 months    Discussed with attending hepatologist, Dr. Joey Moss MD  PGY-3    The patient was seen and examined.  The above assessment and plan was developed jointly with the fellow.      I did spend total of 40 minutes (on the date of the encounter), including 30 minutes of face-to-face clinic time including counseling. The rest of the time was spent in documentation and review of records.     Thank you very much for allowing me to participate in the care of this patient.  If you have any questions regarding recommendations, please do not hesitate to contact me.         Raphael Cook MD      Professor of Medicine  [employer]      Executive Medical Director, Solid Organ Transplant Program  [facility]  HPI:  52 year old female with pmhx significant for CUETO s/p DBD liver transplant 11/2/2022, course has been complicated by some wound healing/fluid collections thought 2/2 sutures which has since resolved, as well as some intermittent hyperkalemia.    Still having nausea, decreased PO intake because of this, however nausea well controlled with PRN zofran.    Has been taken off Valcyte by transplant, both patient and donor CMV+     Cr. Elevated but stable.      Denies jaundice, abdominal distension, lower  extremity edema, lethargy or confusion.    Denies melena, hematemesis or hematochezia.    Denies fevers, sweats, chills or weight loss.    Medical hx Surgical hx   Past Medical History:   Diagnosis Date     Anemia      Anxiety      Cold sore      Depressive disorder      Diabetes (H)     Type 2 DM/No Insulin      Encephalopathy      Esophageal varices without bleeding (H)     grade I     History of blood transfusion     10/2019     History of seizure     diabetic seizure     Insomnia      Liver cirrhosis secondary to CUETO (H) 2020     Migraine      Pleural effusion      Thrombocytopenia (H)      Thyroid disease       Past Surgical History:   Procedure Laterality Date     APPENDECTOMY           BENCH LIVER N/A 2022    Procedure: Bench liver;  Surgeon: Delbert Morgan MD;  Location: [facility] OR     COLONOSCOPY      2020 at [facility]      ENT SURGERY      tempanoplasty     GI SURGERY      EGD 2020 at [facility]      GYN SURGERY      laparoscopic ablation     IR TRANSVEN INTRAHEPATIC PORTOSYST SHUNT  2021     MAMMOPLASTY REDUCTION BILATERAL       DC STAB PHELBECTOMY VARICOSE VEINS, LESS THAN 10 INCISIONS, ONE EXTREMITY       RNY gastric bypass  2006    retoocolic, retrogastric, [facility]     TONSILLECTOMY & ADENOIDECTOMY Bilateral      TRANSPLANT LIVER RECIPIENT  DONOR N/A 2022    Procedure: TRANSPLANT, LIVER, RECIPIENT,  DONOR;  Surgeon: Delbert Morgan MD;  Location: [facility] OR     WISDOM TEETH EXTRACTION            Medications  Current Outpatient Medications   Medication Sig Dispense Refill      magnesium glycinate lysinate chelate (DOCTOR'S BEST) 100 MG TABS tablet Take 2 tablets (200 mg) by mouth 3 times daily 540 tablet 3     acetaminophen (TYLENOL) 325 MG tablet Take 3 tablets (975 mg) by mouth every 8 hours as needed for mild pain 100 tablet 0     amylase-lipase-protease (CREON 24) 70268-62716 units CPEP per EC capsule Take 2 capsules by mouth 3  times daily (with meals) 180 capsule 3     amylase-lipase-protease (CREON 24) 86300-33939 units CPEP per EC capsule Take 1 capsule by mouth Take with snacks or supplements (with snacks) 90 capsule 3     aspirin (ASA) 81 MG chewable tablet Take 1 tablet (81 mg) by mouth daily 90 tablet 3     calcium carbonate-vitamin D (OSCAL) 500-5 MG-MCG tablet Take 1 tablet by mouth 2 times daily       capsaicin 0.035 % CREA Externally apply topically 3 times daily       Continuous Blood Gluc  (DEXCOM G6 ) LUNA Use as directed for continuous glucose monitoring.       Continuous Blood Gluc Sensor (DEXCOM G6 SENSOR) MISC Use as directed for continuous glucose monitoring.  Change sensor every 10 days.       Continuous Blood Gluc Transmit (DEXCOM G6 TRANSMITTER) MISC Use as directed for continuous glucose monitoring.  Change every 3 months.       cyanocobalamin (VITAMIN B-12) 1000 MCG tablet Take 1,000 mcg by mouth every 7 days Take 1 tablet by mouth every 7 days on Wednesdays       cyclobenzaprine (FLEXERIL) 10 MG tablet Take 1 tablet (10 mg) by mouth every 8 hours as needed for muscle spasms 30 tablet 0     dapsone (ACZONE) 25 MG tablet Take 2 tablets (50 mg) by mouth daily 180 tablet 1     docusate sodium (COLACE) 100 MG capsule Take 1 capsule (100 mg) by mouth 2 times daily 60 capsule 1     escitalopram (LEXAPRO) 20 MG tablet Take 20 mg by mouth daily       folic acid (FOLVITE) 1 MG tablet Take 1 mg by mouth every morning       gabapentin (NEURONTIN) 100 MG capsule Take 2 capsules (200 mg) by mouth 3 times daily 180 capsule 2     insulin aspart (NOVOLOG VIAL) 100 UNITS/ML vial Inject 1-10 Units Subcutaneous 4 times daily (with meals and nightly)       insulin glargine (LANTUS PEN) 100 UNIT/ML pen Inject 11 Units Subcutaneous every morning (before breakfast)       insulin pen needle (BD GRISEL U/F) 32G X 4 MM miscellaneous Inject 1 each Subcutaneous       levothyroxine (SYNTHROID/LEVOTHROID) 125 MCG tablet Take 125  mcg by mouth daily       LORazepam (ATIVAN) 0.5 MG tablet Take 0.5 mg by mouth nightly as needed For anxiety       melatonin 5 MG tablet Take 5 mg by mouth At Bedtime       multivitamin CF FORMULA (DEKAS PLUS) capsule Take 1 capsule by mouth daily 90 capsule 3     mycophenolate (GENERIC EQUIVALENT) 250 MG capsule Take 1 capsule (250 mg) by mouth every 12 hours for 7 days, THEN 1 capsule (250 mg) daily for 7 days. Then discontinue completely. 540 capsule 0     ondansetron (ZOFRAN ODT) 8 MG ODT tab Take 1 tablet (8 mg) by mouth every 8 hours as needed for nausea 30 tablet 0     polyethylene glycol (MIRALAX) 17 GM/Dose powder Take 34 g by mouth 2 times daily Until having consistent stools with substance. 510 g 3     prochlorperazine (COMPAZINE) 10 MG tablet Take 0.5 tablets (5 mg) by mouth every 8 hours as needed for nausea or vomiting 10 tablet 0     rizatriptan (MAXALT) 10 MG tablet Take 10 mg by mouth as needed Take 1 Tablet (10 mg) by mouth as needed for Headache. at onset of migraine. May repeat in 2 hr if needed up to 30mg in 24 hr & MAX use 5 days/month. Do not use within 24 hours of an ergotamine preparation or a different triptan.       sennosides (SENOKOT) 8.6 MG tablet Take 1-2 tablets by mouth 2 times daily 60 tablet 1     sitagliptin (JANUVIA) 100 MG tablet Take 1 tablet (100 mg) by mouth daily 30 tablet 0     tacrolimus (GENERIC EQUIVALENT) 0.5 MG capsule Take 1 capsule (0.5 mg) by mouth as needed (For dose changes) Total dose 6 mg AM, 5 mg PM 90 capsule 3     tacrolimus (GENERIC EQUIVALENT) 1 MG capsule Take 1 capsule (1 mg) by mouth every morning Total dose 6 mg AM, 5 mg PM 90 capsule 3     tacrolimus (GENERIC EQUIVALENT) 5 MG capsule Take 1 capsule (5 mg) by mouth 2 times daily Total dose 6 mg AM, 5 mg PM 90 capsule 3     topiramate (TOPAMAX) 25 MG tablet Take 3 tablets (75 mg) by mouth At Bedtime 90 tablet 2     traZODone (DESYREL) 50 MG tablet Take 50 mg by mouth At Bedtime          Allergies  Allergies   Allergen Reactions     Methylprednisolone Hives     Per patient, reaction occurred in 1999 or earlier. Broke out in round, flat hives in neck and chest area. No shortness of breath or swelling. Unknown if reaction occurred immediately after administration.      Adhesive Tape Itching     Oxycodone      slurring     Droperidol Anxiety     Nsaids Other (See Comments)     GI bleed.     Prednisone Anxiety, Hives and Rash     Per patient she has tolerated this since       Family hx Social hx   Family History   Problem Relation Age of Onset     Anesthesia Reaction Nephew         PONV     Bleeding Disorder No family hx of      Clotting Disorder No family hx of       Social History     Tobacco Use     Smoking status: Never     Smokeless tobacco: Never   Substance Use Topics     Alcohol use: Not Currently     Comment: Last drink was in 2017     Drug use: Never        Review of systems  A 10-point review of systems was negative.    Examination  /74   Pulse 65   Temp 97.5  F (36.4  C)   Wt 72.6 kg (160 lb)   LMP  (LMP Unknown)   BMI 25.44 kg/m    Body mass index is 25.44 kg/m .    Gen-NAD  Eye- EOMI  ENT- MMM, normal oropharynx  CVS- RRR, no murmurs  RS- CTA bilaterally  Abd- Soft, NT/ND, incisions well healed, no drainage or erythema. +BS.  Extr- 2+ radial pulses bilaterally, no lower extremity edema bilaterally  MS- hands without clubbing  Neuro- A+Ox3, no asterixis  Skin- no rash or jaundice  Psych- normal mood    Laboratory  BMP  Recent Labs   Lab Test 02/06/23  0904 02/02/23  0908 01/30/23  0859 01/26/23  0858    141 141 143   POTASSIUM 5.3 5.7* 5.5* 5.3   CHLORIDE 114* 114* 114* 117*   MAURI 8.2* 8.0* 8.5 8.1*   CO2 24 23 24 23   BUN 20 19 19 17   CR 1.18* 1.07* 1.29* 1.10*   GLC 84 238* 201* 230*     CBC  Recent Labs   Lab Test 02/06/23  0904 02/02/23  0908 01/30/23  0859 01/28/23  1120   WBC 2.0*  2.0* 2.2*  2.2* 3.1* 3.9*   RBC 3.63* 3.46* 3.57* 3.42*   HGB 10.5* 10.0*  10.4* 9.9*   HCT 34.4* 33.1* 34.2* 32.6*   MCV 95 96 96 95   MCH 28.9 28.9 29.1 28.9   MCHC 30.5* 30.2* 30.4* 30.4*   RDW 13.6 13.6 14.1 13.8    159 158 142*     Liver Enzymes   Recent Labs   Lab Test 02/06/23  0904   PROTTOTAL 6.0*   ALBUMIN 3.0*   BILITOTAL 0.4   ALKPHOS 82   AST 16   ALT 12      INR   INR   Date Value Ref Range Status   11/04/2022 1.32 (H) 0.85 - 1.15 Final   07/05/2021 1.18 (H) 0.86 - 1.14 Final     INR (External)   Date Value Ref Range Status   09/15/2022 1.8 (H) 0.9 - 1.1 Final         Viral Hepatitis  Hepatitis A No results found for: HAAB, HAABM    Hepatitis B   Lab Results   Component Value Date    HBCAB Nonreactive 11/01/2022    AUSAB 1.40 11/01/2022       Hepatitis C   Lab Results   Component Value Date    HCVAB Nonreactive 11/01/2022       Iron Studies  Lab Results   Component Value Date    IRON 58 09/15/2022     (L) 09/15/2022     No results found for: , HH63D    Alpha 1 Antitrypsin No results found for: T2VFMPTI, A1AP    Anti Mitochondrial Antibody No results found for: MITABY    Anti Smooth Muscle Antibody No results found for: [id number], SMOAB    Anti Nuclear Antibody No results found for: HAZEL  Immunoglobulins No results found for: [id number], [id number]    Celiac No results found for: TTGG, TTG, IGA     PHQ:   PHQ 1/30/2023   PHQ-9 Total Score 13   Q9: Thoughts of better off dead/self-harm past 2 weeks Not at all        Radiology    Endoscopy

## 2023-02-07 NOTE — TELEPHONE ENCOUNTER
Patient Call: General  Route to LPN    Reason for call: called in regards to touch base and follow up. Call back number is [phone].    Call back needed? Yes    Return Call Needed  Same as documented in contacts section  When to return call?: Same day: Route High Priority       ADDENDUM:  Returned patient's call, they are at appt with Dr Cook and will call back if needed.

## 2023-02-07 NOTE — TELEPHONE ENCOUNTER
ISSUE:   Tacrolimus IR level 9.9 on 2/6, goal 6-10, dose 6 mg AM, 5 mg PM.    PLAN:   Please call patient and confirm this was an accurate 12-hour trough. Verify Tacrolimus IR dose 6 mg AM, 5 mg PM. Confirm no new medications or illness. Confirm no missed doses. If accurate trough and accurate dose, decrease Tacrolimus IR dose to 5 mg BID and repeat labs in 1 week.    Edwina Fallon, RN      OUTCOME:   Spoke with Rivera, they confirm accurate trough level and current dose 6 mg am, 5mg pm  . Patient confirmed dose change to 5 mg BID and to repeat labs in 1 weeks. Orders sent to preferred pharmacy for dose change and lab for repeat labs. [facility] voiced understanding of plan.     Ariana Liao LPN

## 2023-02-09 ENCOUNTER — TELEPHONE (OUTPATIENT)
Dept: TRANSPLANT | Facility: CLINIC | Age: 53
End: 2023-02-09

## 2023-02-09 ENCOUNTER — LAB (OUTPATIENT)
Dept: LAB | Facility: CLINIC | Age: 53
End: 2023-02-09
Payer: COMMERCIAL

## 2023-02-09 DIAGNOSIS — Z94.4 LIVER TRANSPLANTED (H): Primary | ICD-10-CM

## 2023-02-09 DIAGNOSIS — D72.819 LEUKOPENIA, UNSPECIFIED TYPE: Primary | ICD-10-CM

## 2023-02-09 DIAGNOSIS — Z94.4 LIVER TRANSPLANTED (H): ICD-10-CM

## 2023-02-09 DIAGNOSIS — D72.819 LEUKOPENIA, UNSPECIFIED TYPE: ICD-10-CM

## 2023-02-09 DIAGNOSIS — Z94.4 LIVER REPLACED BY TRANSPLANT (H): ICD-10-CM

## 2023-02-09 LAB
ALBUMIN SERPL-MCNC: 2.8 G/DL (ref 3.4–5)
ALP SERPL-CCNC: 76 U/L (ref 40–150)
ALT SERPL W P-5'-P-CCNC: 16 U/L (ref 0–50)
ANION GAP SERPL CALCULATED.3IONS-SCNC: 2 MMOL/L (ref 3–14)
AST SERPL W P-5'-P-CCNC: 16 U/L (ref 0–45)
BASOPHILS # BLD AUTO: 0 10E3/UL (ref 0–0.2)
BASOPHILS NFR BLD AUTO: 1 %
BILIRUB DIRECT SERPL-MCNC: 0.1 MG/DL (ref 0–0.2)
BILIRUB SERPL-MCNC: 0.3 MG/DL (ref 0.2–1.3)
BUN SERPL-MCNC: 22 MG/DL (ref 7–30)
CALCIUM SERPL-MCNC: 8 MG/DL (ref 8.5–10.1)
CHLORIDE BLD-SCNC: 116 MMOL/L (ref 94–109)
CO2 SERPL-SCNC: 24 MMOL/L (ref 20–32)
CREAT SERPL-MCNC: 1.14 MG/DL (ref 0.52–1.04)
EOSINOPHIL # BLD AUTO: 0.1 10E3/UL (ref 0–0.7)
EOSINOPHIL NFR BLD AUTO: 4 %
ERYTHROCYTE [DISTWIDTH] IN BLOOD BY AUTOMATED COUNT: 13.7 % (ref 10–15)
GFR SERPL CREATININE-BSD FRML MDRD: 58 ML/MIN/1.73M2
GLUCOSE BLD-MCNC: 95 MG/DL (ref 70–99)
HCT VFR BLD AUTO: 30.6 % (ref 35–47)
HGB BLD-MCNC: 9.3 G/DL (ref 11.7–15.7)
HOLD SPECIMEN: NORMAL
IMM GRANULOCYTES # BLD: 0 10E3/UL
IMM GRANULOCYTES NFR BLD: 0 %
LYMPHOCYTES # BLD AUTO: 0.7 10E3/UL (ref 0.8–5.3)
LYMPHOCYTES NFR BLD AUTO: 28 %
MAGNESIUM SERPL-MCNC: 1.7 MG/DL (ref 1.6–2.3)
MCH RBC QN AUTO: 29.1 PG (ref 26.5–33)
MCHC RBC AUTO-ENTMCNC: 30.4 G/DL (ref 31.5–36.5)
MCV RBC AUTO: 96 FL (ref 78–100)
MONOCYTES # BLD AUTO: 0.2 10E3/UL (ref 0–1.3)
MONOCYTES NFR BLD AUTO: 7 %
NEUTROPHILS # BLD AUTO: 1.6 10E3/UL (ref 1.6–8.3)
NEUTROPHILS NFR BLD AUTO: 60 %
NRBC # BLD AUTO: 0 10E3/UL
NRBC BLD AUTO-RTO: 0 /100
PHOSPHATE SERPL-MCNC: 4.4 MG/DL (ref 2.5–4.5)
PLATELET # BLD AUTO: 154 10E3/UL (ref 150–450)
POTASSIUM BLD-SCNC: 5.4 MMOL/L (ref 3.4–5.3)
PROT SERPL-MCNC: 5.8 G/DL (ref 6.8–8.8)
RBC # BLD AUTO: 3.2 10E6/UL (ref 3.8–5.2)
SODIUM SERPL-SCNC: 142 MMOL/L (ref 133–144)
TACROLIMUS BLD-MCNC: 8.6 UG/L (ref 5–15)
TME LAST DOSE: NORMAL H
TME LAST DOSE: NORMAL H
WBC # BLD AUTO: 2.6 10E3/UL (ref 4–11)
WBC # BLD AUTO: 2.6 10E3/UL (ref 4–11)

## 2023-02-09 PROCEDURE — 36415 COLL VENOUS BLD VENIPUNCTURE: CPT

## 2023-02-09 PROCEDURE — 80197 ASSAY OF TACROLIMUS: CPT

## 2023-02-09 PROCEDURE — 80053 COMPREHEN METABOLIC PANEL: CPT

## 2023-02-09 PROCEDURE — 83735 ASSAY OF MAGNESIUM: CPT

## 2023-02-09 PROCEDURE — 84100 ASSAY OF PHOSPHORUS: CPT

## 2023-02-09 PROCEDURE — 85025 COMPLETE CBC W/AUTO DIFF WBC: CPT

## 2023-02-09 PROCEDURE — 82248 BILIRUBIN DIRECT: CPT

## 2023-02-09 PROCEDURE — 85048 AUTOMATED LEUKOCYTE COUNT: CPT

## 2023-02-09 RX ORDER — TACROLIMUS 1 MG/1
1 CAPSULE ORAL PRN
Qty: 90 CAPSULE | Refills: 3 | Status: SHIPPED | OUTPATIENT
Start: 2023-02-09 | End: 2023-02-14

## 2023-02-09 RX ORDER — TACROLIMUS 0.5 MG/1
0.5 CAPSULE ORAL PRN
Qty: 90 CAPSULE | Refills: 3 | Status: SHIPPED | OUTPATIENT
Start: 2023-02-09 | End: 2023-10-27 | Stop reason: DRUGHIGH

## 2023-02-09 NOTE — TELEPHONE ENCOUNTER
Spoke to Susan. I let her know that all lab orders have been re-entered. She is getting labs drawn now.  
Susan calling is at lab needing orders put into Epic, lab is only showing Tacrolimus order. Patient stated she has another  Appointment at 1000 and would like a call back when orders are placed please call patient's husbands cell phone [phone]  
Communicate Risk of Fall with Harm to all staff/Reinforce activity limits and safety measures with patient and family/Tailored Fall Risk Interventions/Visual Cue: Yellow wristband and red socks/Bed in lowest position, wheels locked, appropriate side rails in place/Call bell, personal items and telephone in reach/Instruct patient to call for assistance before getting out of bed or chair/Non-slip footwear when patient is out of bed/[place] to call system/Physically safe environment - no spills, clutter or unnecessary equipment/Purposeful Proactive Rounding/Room/bathroom lighting operational, light cord in reach

## 2023-02-13 ENCOUNTER — LAB (OUTPATIENT)
Dept: LAB | Facility: CLINIC | Age: 53
End: 2023-02-13
Payer: COMMERCIAL

## 2023-02-13 DIAGNOSIS — Z94.4 LIVER TRANSPLANTED (H): ICD-10-CM

## 2023-02-13 LAB
ALBUMIN SERPL-MCNC: 2.7 G/DL (ref 3.4–5)
ALP SERPL-CCNC: 77 U/L (ref 40–150)
ALT SERPL W P-5'-P-CCNC: 17 U/L (ref 0–50)
ANION GAP SERPL CALCULATED.3IONS-SCNC: 2 MMOL/L (ref 3–14)
AST SERPL W P-5'-P-CCNC: 18 U/L (ref 0–45)
BASOPHILS # BLD AUTO: 0 10E3/UL (ref 0–0.2)
BASOPHILS NFR BLD AUTO: 1 %
BILIRUB DIRECT SERPL-MCNC: 0.1 MG/DL (ref 0–0.2)
BILIRUB SERPL-MCNC: 0.3 MG/DL (ref 0.2–1.3)
BUN SERPL-MCNC: 23 MG/DL (ref 7–30)
CALCIUM SERPL-MCNC: 8.1 MG/DL (ref 8.5–10.1)
CHLORIDE BLD-SCNC: 116 MMOL/L (ref 94–109)
CO2 SERPL-SCNC: 24 MMOL/L (ref 20–32)
CREAT SERPL-MCNC: 1.14 MG/DL (ref 0.52–1.04)
EOSINOPHIL # BLD AUTO: 0.1 10E3/UL (ref 0–0.7)
EOSINOPHIL NFR BLD AUTO: 5 %
ERYTHROCYTE [DISTWIDTH] IN BLOOD BY AUTOMATED COUNT: 14.3 % (ref 10–15)
GFR SERPL CREATININE-BSD FRML MDRD: 58 ML/MIN/1.73M2
GLUCOSE BLD-MCNC: 116 MG/DL (ref 70–99)
HCT VFR BLD AUTO: 31.2 % (ref 35–47)
HGB BLD-MCNC: 9.5 G/DL (ref 11.7–15.7)
IMM GRANULOCYTES # BLD: 0 10E3/UL
IMM GRANULOCYTES NFR BLD: 0 %
LYMPHOCYTES # BLD AUTO: 1.1 10E3/UL (ref 0.8–5.3)
LYMPHOCYTES NFR BLD AUTO: 40 %
MAGNESIUM SERPL-MCNC: 1.8 MG/DL (ref 1.6–2.3)
MCH RBC QN AUTO: 29.3 PG (ref 26.5–33)
MCHC RBC AUTO-ENTMCNC: 30.4 G/DL (ref 31.5–36.5)
MCV RBC AUTO: 96 FL (ref 78–100)
MONOCYTES # BLD AUTO: 0.2 10E3/UL (ref 0–1.3)
MONOCYTES NFR BLD AUTO: 8 %
NEUTROPHILS # BLD AUTO: 1.2 10E3/UL (ref 1.6–8.3)
NEUTROPHILS NFR BLD AUTO: 46 %
NRBC # BLD AUTO: 0 10E3/UL
NRBC BLD AUTO-RTO: 0 /100
PHOSPHATE SERPL-MCNC: 4.4 MG/DL (ref 2.5–4.5)
PLATELET # BLD AUTO: 164 10E3/UL (ref 150–450)
POTASSIUM BLD-SCNC: 4.5 MMOL/L (ref 3.4–5.3)
PROT SERPL-MCNC: 5.5 G/DL (ref 6.8–8.8)
RBC # BLD AUTO: 3.24 10E6/UL (ref 3.8–5.2)
SODIUM SERPL-SCNC: 142 MMOL/L (ref 133–144)
TACROLIMUS BLD-MCNC: 10.1 UG/L (ref 5–15)
TME LAST DOSE: NORMAL H
TME LAST DOSE: NORMAL H
WBC # BLD AUTO: 2.6 10E3/UL (ref 4–11)

## 2023-02-13 PROCEDURE — 85025 COMPLETE CBC W/AUTO DIFF WBC: CPT

## 2023-02-13 PROCEDURE — 82248 BILIRUBIN DIRECT: CPT

## 2023-02-13 PROCEDURE — 84100 ASSAY OF PHOSPHORUS: CPT

## 2023-02-13 PROCEDURE — 80053 COMPREHEN METABOLIC PANEL: CPT

## 2023-02-13 PROCEDURE — 83735 ASSAY OF MAGNESIUM: CPT

## 2023-02-13 PROCEDURE — 80197 ASSAY OF TACROLIMUS: CPT

## 2023-02-13 PROCEDURE — 36415 COLL VENOUS BLD VENIPUNCTURE: CPT

## 2023-02-14 ENCOUNTER — TELEPHONE (OUTPATIENT)
Dept: TRANSPLANT | Facility: CLINIC | Age: 53
End: 2023-02-14
Payer: COMMERCIAL

## 2023-02-14 DIAGNOSIS — Z94.4 LIVER REPLACED BY TRANSPLANT (H): ICD-10-CM

## 2023-02-14 RX ORDER — TACROLIMUS 5 MG/1
5 CAPSULE ORAL EVERY EVENING
Qty: 90 CAPSULE | Refills: 3 | Status: SHIPPED | OUTPATIENT
Start: 2023-02-14 | End: 2023-10-27 | Stop reason: DRUGHIGH

## 2023-02-14 RX ORDER — TACROLIMUS 1 MG/1
4 CAPSULE ORAL EVERY MORNING
Qty: 120 CAPSULE | Refills: 11 | Status: SHIPPED | OUTPATIENT
Start: 2023-02-14 | End: 2023-04-10

## 2023-02-17 DIAGNOSIS — Z94.4 LIVER REPLACED BY TRANSPLANT (H): Primary | ICD-10-CM

## 2023-02-17 DIAGNOSIS — D84.9 IMMUNOSUPPRESSED STATUS (H): ICD-10-CM

## 2023-02-20 ENCOUNTER — LAB (OUTPATIENT)
Dept: LAB | Facility: CLINIC | Age: 53
End: 2023-02-20
Payer: COMMERCIAL

## 2023-02-20 DIAGNOSIS — Z94.4 LIVER TRANSPLANTED (H): ICD-10-CM

## 2023-02-20 LAB
ALBUMIN SERPL-MCNC: 2.7 G/DL (ref 3.4–5)
ALP SERPL-CCNC: 79 U/L (ref 40–150)
ALT SERPL W P-5'-P-CCNC: 16 U/L (ref 0–50)
ANION GAP SERPL CALCULATED.3IONS-SCNC: <1 MMOL/L (ref 3–14)
AST SERPL W P-5'-P-CCNC: 16 U/L (ref 0–45)
BASOPHILS # BLD AUTO: 0 10E3/UL (ref 0–0.2)
BASOPHILS NFR BLD AUTO: 1 %
BILIRUB DIRECT SERPL-MCNC: 0.1 MG/DL (ref 0–0.2)
BILIRUB SERPL-MCNC: 0.3 MG/DL (ref 0.2–1.3)
BUN SERPL-MCNC: 19 MG/DL (ref 7–30)
CALCIUM SERPL-MCNC: 8.5 MG/DL (ref 8.5–10.1)
CHLORIDE BLD-SCNC: 115 MMOL/L (ref 94–109)
CO2 SERPL-SCNC: 25 MMOL/L (ref 20–32)
CREAT SERPL-MCNC: 1.12 MG/DL (ref 0.52–1.04)
EOSINOPHIL # BLD AUTO: 0.1 10E3/UL (ref 0–0.7)
EOSINOPHIL NFR BLD AUTO: 6 %
ERYTHROCYTE [DISTWIDTH] IN BLOOD BY AUTOMATED COUNT: 14.8 % (ref 10–15)
GFR SERPL CREATININE-BSD FRML MDRD: 60 ML/MIN/1.73M2
GLUCOSE BLD-MCNC: 141 MG/DL (ref 70–99)
HCT VFR BLD AUTO: 32.6 % (ref 35–47)
HGB BLD-MCNC: 9.7 G/DL (ref 11.7–15.7)
IMM GRANULOCYTES # BLD: 0 10E3/UL
IMM GRANULOCYTES NFR BLD: 0 %
LYMPHOCYTES # BLD AUTO: 0.7 10E3/UL (ref 0.8–5.3)
LYMPHOCYTES NFR BLD AUTO: 30 %
MAGNESIUM SERPL-MCNC: 1.7 MG/DL (ref 1.6–2.3)
MCH RBC QN AUTO: 28.4 PG (ref 26.5–33)
MCHC RBC AUTO-ENTMCNC: 29.8 G/DL (ref 31.5–36.5)
MCV RBC AUTO: 96 FL (ref 78–100)
MONOCYTES # BLD AUTO: 0.1 10E3/UL (ref 0–1.3)
MONOCYTES NFR BLD AUTO: 6 %
NEUTROPHILS # BLD AUTO: 1.4 10E3/UL (ref 1.6–8.3)
NEUTROPHILS NFR BLD AUTO: 57 %
NRBC # BLD AUTO: 0 10E3/UL
NRBC BLD AUTO-RTO: 0 /100
PHOSPHATE SERPL-MCNC: 3.5 MG/DL (ref 2.5–4.5)
PLATELET # BLD AUTO: 127 10E3/UL (ref 150–450)
POTASSIUM BLD-SCNC: 5 MMOL/L (ref 3.4–5.3)
PROT SERPL-MCNC: 5.7 G/DL (ref 6.8–8.8)
RBC # BLD AUTO: 3.41 10E6/UL (ref 3.8–5.2)
SODIUM SERPL-SCNC: 139 MMOL/L (ref 133–144)
TACROLIMUS BLD-MCNC: 8.9 UG/L (ref 5–15)
TME LAST DOSE: NORMAL H
TME LAST DOSE: NORMAL H
WBC # BLD AUTO: 2.5 10E3/UL (ref 4–11)

## 2023-02-20 PROCEDURE — 80197 ASSAY OF TACROLIMUS: CPT

## 2023-02-20 PROCEDURE — 36415 COLL VENOUS BLD VENIPUNCTURE: CPT

## 2023-02-20 PROCEDURE — 82248 BILIRUBIN DIRECT: CPT

## 2023-02-20 PROCEDURE — 85025 COMPLETE CBC W/AUTO DIFF WBC: CPT

## 2023-02-20 PROCEDURE — 83735 ASSAY OF MAGNESIUM: CPT

## 2023-02-20 PROCEDURE — 84100 ASSAY OF PHOSPHORUS: CPT

## 2023-02-20 PROCEDURE — 80053 COMPREHEN METABOLIC PANEL: CPT

## 2023-02-27 ENCOUNTER — LAB (OUTPATIENT)
Dept: LAB | Facility: CLINIC | Age: 53
End: 2023-02-27
Payer: COMMERCIAL

## 2023-02-27 DIAGNOSIS — Z94.4 LIVER TRANSPLANTED (H): ICD-10-CM

## 2023-02-27 LAB
ALBUMIN SERPL-MCNC: 2.7 G/DL (ref 3.4–5)
ALP SERPL-CCNC: 84 U/L (ref 40–150)
ALT SERPL W P-5'-P-CCNC: 19 U/L (ref 0–50)
ANION GAP SERPL CALCULATED.3IONS-SCNC: <1 MMOL/L (ref 3–14)
AST SERPL W P-5'-P-CCNC: 15 U/L (ref 0–45)
BASOPHILS # BLD AUTO: 0 10E3/UL (ref 0–0.2)
BASOPHILS NFR BLD AUTO: 1 %
BILIRUB DIRECT SERPL-MCNC: 0.1 MG/DL (ref 0–0.2)
BILIRUB SERPL-MCNC: 0.4 MG/DL (ref 0.2–1.3)
BUN SERPL-MCNC: 20 MG/DL (ref 7–30)
CALCIUM SERPL-MCNC: 8.2 MG/DL (ref 8.5–10.1)
CHLORIDE BLD-SCNC: 116 MMOL/L (ref 94–109)
CO2 SERPL-SCNC: 24 MMOL/L (ref 20–32)
CREAT SERPL-MCNC: 1.22 MG/DL (ref 0.52–1.04)
EOSINOPHIL # BLD AUTO: 0.2 10E3/UL (ref 0–0.7)
EOSINOPHIL NFR BLD AUTO: 7 %
ERYTHROCYTE [DISTWIDTH] IN BLOOD BY AUTOMATED COUNT: 15.5 % (ref 10–15)
GFR SERPL CREATININE-BSD FRML MDRD: 54 ML/MIN/1.73M2
GLUCOSE BLD-MCNC: 125 MG/DL (ref 70–99)
HCT VFR BLD AUTO: 31.6 % (ref 35–47)
HGB BLD-MCNC: 9.5 G/DL (ref 11.7–15.7)
IMM GRANULOCYTES # BLD: 0 10E3/UL
IMM GRANULOCYTES NFR BLD: 0 %
LYMPHOCYTES # BLD AUTO: 0.8 10E3/UL (ref 0.8–5.3)
LYMPHOCYTES NFR BLD AUTO: 29 %
MAGNESIUM SERPL-MCNC: 1.5 MG/DL (ref 1.6–2.3)
MCH RBC QN AUTO: 29.3 PG (ref 26.5–33)
MCHC RBC AUTO-ENTMCNC: 30.1 G/DL (ref 31.5–36.5)
MCV RBC AUTO: 98 FL (ref 78–100)
MONOCYTES # BLD AUTO: 0.2 10E3/UL (ref 0–1.3)
MONOCYTES NFR BLD AUTO: 8 %
NEUTROPHILS # BLD AUTO: 1.5 10E3/UL (ref 1.6–8.3)
NEUTROPHILS NFR BLD AUTO: 55 %
NRBC # BLD AUTO: 0 10E3/UL
NRBC BLD AUTO-RTO: 0 /100
PHOSPHATE SERPL-MCNC: 4 MG/DL (ref 2.5–4.5)
PLATELET # BLD AUTO: 126 10E3/UL (ref 150–450)
POTASSIUM BLD-SCNC: 5.2 MMOL/L (ref 3.4–5.3)
PROT SERPL-MCNC: 5.6 G/DL (ref 6.8–8.8)
RBC # BLD AUTO: 3.24 10E6/UL (ref 3.8–5.2)
SODIUM SERPL-SCNC: 140 MMOL/L (ref 133–144)
TACROLIMUS BLD-MCNC: 7.4 UG/L (ref 5–15)
TME LAST DOSE: NORMAL H
TME LAST DOSE: NORMAL H
WBC # BLD AUTO: 2.8 10E3/UL (ref 4–11)

## 2023-02-27 PROCEDURE — 80053 COMPREHEN METABOLIC PANEL: CPT

## 2023-02-27 PROCEDURE — 80197 ASSAY OF TACROLIMUS: CPT

## 2023-02-27 PROCEDURE — 83735 ASSAY OF MAGNESIUM: CPT

## 2023-02-27 PROCEDURE — 84100 ASSAY OF PHOSPHORUS: CPT

## 2023-02-27 PROCEDURE — 85025 COMPLETE CBC W/AUTO DIFF WBC: CPT

## 2023-02-27 PROCEDURE — 82248 BILIRUBIN DIRECT: CPT

## 2023-02-27 PROCEDURE — 36415 COLL VENOUS BLD VENIPUNCTURE: CPT

## 2023-03-01 DIAGNOSIS — D84.9 IMMUNOSUPPRESSED STATUS (H): ICD-10-CM

## 2023-03-01 DIAGNOSIS — Z94.4 LIVER REPLACED BY TRANSPLANT (H): Primary | ICD-10-CM

## 2023-03-01 NOTE — PROGRESS NOTES
Rehabilitation Services        OUTPATIENT OCCUPATIONAL THERAPY EDEMA EVALUATION  PLAN OF TREATMENT FOR OUTPATIENT REHABILITATION  (COMPLETE FOR INITIAL CLAIMS ONLY)  Patient's Last Name, First Name, Susan Navarrete                           Provider s Name:   Onelia Nunes OT Medical Record No.  [id number]     Start of Care Date:  11/23/22   Onset Date:  10/18/22   Type:  OT   Medical Diagnosis:      Therapy Diagnosis:  BLE and truncal lymphedema Visits from SOC:  1                                     __________________________________________________________________________________   Plan of Treatment/Functional Goals:    Manual lymph drainage, Gradient compression bandaging, Fit for compression garment, Exercises, Precautions to prevent infection / exacerbation, Education, Manual therapy, ADL training, Skin care / precautions, Soft tissue mobilization, Home management program development        GOALS  1. Goal description: Pt will demonstrate a reduction of at least 200 mL in BLE to improve skin integrity, safety with mobility and fit of clothing       Target date: 02/21/23  2. Goal description: Pt will be fit for and demonstrate independence using new compression garments for long term edema management as evidenced by a no more than 50 mL increase in volume after transitioning into garments.       Target date: 02/21/23  3. Goal description: Pt will verbalize understanding of long term edema management including following recommended wear schedule for compression garments, manual techniques, skin care and exercise.       Target date: 02/21/23      Treatment Frequency: 2x/week   Treatment duration: 6 weeks    Onelia Nunes OT                                    I CERTIFY THE NEED FOR THESE SERVICES FURNISHED UNDER        THIS PLAN OF TREATMENT AND WHILE UNDER MY CARE     (Physician co-signature of  this document indicates review and certification of the therapy plan).                                       Referring physician: Dr Raphael Cook   Initial Assessment  See Epic Evaluation- Start of care: 11/23/22

## 2023-03-01 NOTE — PROGRESS NOTES
"Patient sent MyC message stating:    \"Edwina I am still concerned about my WBC and my hemoglobin. I have been off mychophenolytate [sp] for a few weeks I would think that it would be coming up faster. I am still so exhausted and so run down. I am trying to keep out of public as much as possible.   I am concerned with my creatonine level?     My right thigh on the outside has been more intense with nerve pain and have had a lot more shooting pain. This has even woke me up. please advise\"    Informed Susan that her WBC, ANC & hgb looks good right now and that her Dapsone medication can cause low hgb, WBC- we will continue to watch for now. Creatinine level up a tad,Tac level from 2/27 7.4 (goal range 6-10). Encouraged continued hydration. Will order CMV PCR to be drawn at next labs, last value negative on 1/26/23. Decreased lab frequency to every other week- next labs to be drawn on 3/13/23. Instructed Susan to speak to PCP regarding nerve pain, patient on Gabapentin.  "

## 2023-03-02 ENCOUNTER — TELEPHONE (OUTPATIENT)
Dept: TRANSPLANT | Facility: CLINIC | Age: 53
End: 2023-03-02

## 2023-03-02 DIAGNOSIS — Z94.4 LIVER REPLACED BY TRANSPLANT (H): ICD-10-CM

## 2023-03-02 RX ORDER — DAPSONE 25 MG/1
25 TABLET ORAL DAILY
Qty: 90 TABLET | Refills: 0 | COMMUNITY
Start: 2023-03-02 | End: 2023-04-14

## 2023-03-02 NOTE — TELEPHONE ENCOUNTER
Spoke to Susan. She states she is overall doing better today. Still c/o pain in arms/legs, will be seeing Orthopedic Specialist. Still working with Physical Therapy through [facility]. Was at an OB/GYN appt for vaginal spotting, has utrasound ordered for 3/9/23. Susan is still c/o nerve pain in thigh, recommended she speak to her Neurologist about this. Still taking Gabapentin. C/o intermittent nausea, taking PRN Zofran with relief. States nausea is right when she wakes up and after taking pills. Encouraged patient to eat food prior to taking pills, she understands. Blood glucose levels have been good, currently off subcutaneous insulin, only on Januvia at this time, has appt coming up with Endocrinologist. C/o ongoing constipation, taking Miralax and Colace. Not great about taking her Creon, will try to be better at that.    She reports she is meeting with donor's family on Saturday and is nervous about that but excited. Plan to get labs redrawn 3/13/23.

## 2023-03-13 ENCOUNTER — LAB (OUTPATIENT)
Dept: LAB | Facility: CLINIC | Age: 53
End: 2023-03-13
Payer: COMMERCIAL

## 2023-03-13 DIAGNOSIS — Z94.4 LIVER TRANSPLANTED (H): ICD-10-CM

## 2023-03-13 DIAGNOSIS — Z94.4 LIVER REPLACED BY TRANSPLANT (H): ICD-10-CM

## 2023-03-13 DIAGNOSIS — D84.9 IMMUNOSUPPRESSED STATUS (H): ICD-10-CM

## 2023-03-13 LAB
ALBUMIN SERPL-MCNC: 2.8 G/DL (ref 3.4–5)
ALP SERPL-CCNC: 85 U/L (ref 40–150)
ALT SERPL W P-5'-P-CCNC: 21 U/L (ref 0–50)
ANION GAP SERPL CALCULATED.3IONS-SCNC: 3 MMOL/L (ref 3–14)
AST SERPL W P-5'-P-CCNC: 14 U/L (ref 0–45)
BASOPHILS # BLD AUTO: 0 10E3/UL (ref 0–0.2)
BASOPHILS NFR BLD AUTO: 1 %
BILIRUB DIRECT SERPL-MCNC: 0.1 MG/DL (ref 0–0.2)
BILIRUB SERPL-MCNC: 0.4 MG/DL (ref 0.2–1.3)
BUN SERPL-MCNC: 20 MG/DL (ref 7–30)
CALCIUM SERPL-MCNC: 7.9 MG/DL (ref 8.5–10.1)
CHLORIDE BLD-SCNC: 117 MMOL/L (ref 94–109)
CO2 SERPL-SCNC: 23 MMOL/L (ref 20–32)
CREAT SERPL-MCNC: 1.23 MG/DL (ref 0.52–1.04)
EOSINOPHIL # BLD AUTO: 0.2 10E3/UL (ref 0–0.7)
EOSINOPHIL NFR BLD AUTO: 6 %
ERYTHROCYTE [DISTWIDTH] IN BLOOD BY AUTOMATED COUNT: 15.4 % (ref 10–15)
GFR SERPL CREATININE-BSD FRML MDRD: 53 ML/MIN/1.73M2
GLUCOSE BLD-MCNC: 179 MG/DL (ref 70–99)
HCT VFR BLD AUTO: 30.6 % (ref 35–47)
HGB BLD-MCNC: 9.5 G/DL (ref 11.7–15.7)
IMM GRANULOCYTES # BLD: 0 10E3/UL
IMM GRANULOCYTES NFR BLD: 0 %
LYMPHOCYTES # BLD AUTO: 0.6 10E3/UL (ref 0.8–5.3)
LYMPHOCYTES NFR BLD AUTO: 25 %
MAGNESIUM SERPL-MCNC: 1.5 MG/DL (ref 1.6–2.3)
MCH RBC QN AUTO: 30.6 PG (ref 26.5–33)
MCHC RBC AUTO-ENTMCNC: 31 G/DL (ref 31.5–36.5)
MCV RBC AUTO: 99 FL (ref 78–100)
MONOCYTES # BLD AUTO: 0.2 10E3/UL (ref 0–1.3)
MONOCYTES NFR BLD AUTO: 6 %
NEUTROPHILS # BLD AUTO: 1.5 10E3/UL (ref 1.6–8.3)
NEUTROPHILS NFR BLD AUTO: 62 %
NRBC # BLD AUTO: 0 10E3/UL
NRBC BLD AUTO-RTO: 0 /100
PHOSPHATE SERPL-MCNC: 4 MG/DL (ref 2.5–4.5)
PLATELET # BLD AUTO: 128 10E3/UL (ref 150–450)
POTASSIUM BLD-SCNC: 4.7 MMOL/L (ref 3.4–5.3)
PROT SERPL-MCNC: 5.7 G/DL (ref 6.8–8.8)
RBC # BLD AUTO: 3.1 10E6/UL (ref 3.8–5.2)
SODIUM SERPL-SCNC: 143 MMOL/L (ref 133–144)
TACROLIMUS BLD-MCNC: 6.5 UG/L (ref 5–15)
TME LAST DOSE: NORMAL H
TME LAST DOSE: NORMAL H
WBC # BLD AUTO: 2.5 10E3/UL (ref 4–11)

## 2023-03-13 PROCEDURE — 85025 COMPLETE CBC W/AUTO DIFF WBC: CPT

## 2023-03-13 PROCEDURE — 84100 ASSAY OF PHOSPHORUS: CPT

## 2023-03-13 PROCEDURE — 80053 COMPREHEN METABOLIC PANEL: CPT

## 2023-03-13 PROCEDURE — 82248 BILIRUBIN DIRECT: CPT

## 2023-03-13 PROCEDURE — 83735 ASSAY OF MAGNESIUM: CPT

## 2023-03-13 PROCEDURE — 80197 ASSAY OF TACROLIMUS: CPT

## 2023-03-13 PROCEDURE — 36415 COLL VENOUS BLD VENIPUNCTURE: CPT

## 2023-03-14 ENCOUNTER — TELEPHONE (OUTPATIENT)
Dept: TRANSPLANT | Facility: CLINIC | Age: 53
End: 2023-03-14

## 2023-03-14 DIAGNOSIS — E83.42 HYPOMAGNESEMIA: ICD-10-CM

## 2023-03-14 DIAGNOSIS — Z94.4 LIVER REPLACED BY TRANSPLANT (H): Primary | ICD-10-CM

## 2023-03-14 LAB — CMV DNA SPEC NAA+PROBE-ACNC: NOT DETECTED IU/ML

## 2023-03-15 ENCOUNTER — TELEPHONE (OUTPATIENT)
Dept: TRANSPLANT | Facility: CLINIC | Age: 53
End: 2023-03-15
Payer: COMMERCIAL

## 2023-03-15 NOTE — TELEPHONE ENCOUNTER
----- Message from Danuta De La Rosa sent at 3/14/2023  4:37 PM CDT -----  Hello,    I just spoke with this pt regarding rescheduling and she had reached out today in regards to wanting to speak with you but it looks like someone misheard her because they thought she was trying to speak with Edwina Logan.    She was wanting to speak with you.      Spoke to Susan. She wanted to update me on a few recent visits she's had with specialists.     Went to neurologist yesterday for her nerve pain in her leg. Suggestion to increase Gabapentin to 3 capsules TID and/or see physical medicine and rehabilitation referral. She states that Neurologist is recommending steroid injection for pain, instructed Susan to let me know what the injection is called so I can ask pharmacist.     Saw OB/GYN regarding recently for vaginal spotting. Blood work showed possible early menopause but unsure. Had transvaginal ultrasound completed, found a simple cyst on her left ovary, has another appt today for possible endometrial bx.

## 2023-03-21 ENCOUNTER — TELEPHONE (OUTPATIENT)
Dept: TRANSPLANT | Facility: CLINIC | Age: 53
End: 2023-03-21
Payer: COMMERCIAL

## 2023-03-21 NOTE — TELEPHONE ENCOUNTER
Transplant Social Work Services Phone Call    Data: Susan underwent a  donor liver transplant on 2022  Intervention: Susan contacted this writer via e-mail and inquired about financial resources. She indicated that they are struggling financially. They are still waiting to hear back about Roly's long term disability and was suppose to be reviewed . From their understanding there was a mis communication between medical records and long term insurance company. They have applied for cash assistance and food and are awaiting to hear back. She inquired if there was anything else to assist them.   She reported that they have not had any income since Roly has been on leave and he has not been cleared for work. She reported that his cancer check will come 3/7.  I indicated that in terms of transplant funds/resources, those are limited. I inquired if they applied for [place] benefit website for additional resources. I also provided [employer] website that may be able to assist in the form of a manuel if they qualify. I recommended that they also contact Roly's  for his current treatment as he may have additional resources available to him.    Assessment: Susan reports financial strain since her spouse has been unable to work.   Education provided by SW: Resources available   Plan: This writer will continue to be availably for ongoing psychosocial support.     RENARD Alvarado, [facility]  Liver Transplant   [facility]  Phone: [phone]  Pager: [phone]

## 2023-03-23 ENCOUNTER — TELEPHONE (OUTPATIENT)
Dept: TRANSPLANT | Facility: CLINIC | Age: 53
End: 2023-03-23
Payer: COMMERCIAL

## 2023-03-23 NOTE — TELEPHONE ENCOUNTER
Spoke to Susan. Was seen at [facility] on Tuesday 3/21/23. Dx with bilateral frozen shoulder. Recommendations were sent to her PT which she states they worked on exercises today. Plan for an MRI to evaluate her back, right leg nerve pain.    Plan for corticosteroid injections in bilateral shoulders. Staff states the name of the medications are Kenalog & Ropivacaine. Message to Davi Clayton to ensure this is safe for patient.

## 2023-03-25 ENCOUNTER — HEALTH MAINTENANCE LETTER (OUTPATIENT)
Age: 53
End: 2023-03-25

## 2023-03-27 ENCOUNTER — LAB (OUTPATIENT)
Dept: LAB | Facility: CLINIC | Age: 53
End: 2023-03-27
Payer: COMMERCIAL

## 2023-03-27 DIAGNOSIS — Z94.4 LIVER TRANSPLANTED (H): ICD-10-CM

## 2023-03-27 LAB
ALBUMIN SERPL-MCNC: 2.9 G/DL (ref 3.4–5)
ALP SERPL-CCNC: 87 U/L (ref 40–150)
ALT SERPL W P-5'-P-CCNC: 15 U/L (ref 0–50)
ANION GAP SERPL CALCULATED.3IONS-SCNC: 1 MMOL/L (ref 3–14)
AST SERPL W P-5'-P-CCNC: 21 U/L (ref 0–45)
BASOPHILS # BLD AUTO: 0 10E3/UL (ref 0–0.2)
BASOPHILS NFR BLD AUTO: 1 %
BILIRUB DIRECT SERPL-MCNC: 0.2 MG/DL (ref 0–0.2)
BILIRUB SERPL-MCNC: 0.5 MG/DL (ref 0.2–1.3)
BUN SERPL-MCNC: 17 MG/DL (ref 7–30)
CALCIUM SERPL-MCNC: 7.8 MG/DL (ref 8.5–10.1)
CHLORIDE BLD-SCNC: 115 MMOL/L (ref 94–109)
CO2 SERPL-SCNC: 27 MMOL/L (ref 20–32)
CREAT SERPL-MCNC: 1.08 MG/DL (ref 0.52–1.04)
EOSINOPHIL # BLD AUTO: 0.1 10E3/UL (ref 0–0.7)
EOSINOPHIL NFR BLD AUTO: 4 %
ERYTHROCYTE [DISTWIDTH] IN BLOOD BY AUTOMATED COUNT: 14.6 % (ref 10–15)
GFR SERPL CREATININE-BSD FRML MDRD: 62 ML/MIN/1.73M2
GLUCOSE BLD-MCNC: 165 MG/DL (ref 70–99)
HCT VFR BLD AUTO: 27.4 % (ref 35–47)
HGB BLD-MCNC: 8.4 G/DL (ref 11.7–15.7)
IMM GRANULOCYTES # BLD: 0 10E3/UL
IMM GRANULOCYTES NFR BLD: 0 %
LYMPHOCYTES # BLD AUTO: 0.8 10E3/UL (ref 0.8–5.3)
LYMPHOCYTES NFR BLD AUTO: 31 %
MAGNESIUM SERPL-MCNC: 1.8 MG/DL (ref 1.6–2.3)
MCH RBC QN AUTO: 31.7 PG (ref 26.5–33)
MCHC RBC AUTO-ENTMCNC: 30.7 G/DL (ref 31.5–36.5)
MCV RBC AUTO: 103 FL (ref 78–100)
MONOCYTES # BLD AUTO: 0.2 10E3/UL (ref 0–1.3)
MONOCYTES NFR BLD AUTO: 6 %
NEUTROPHILS # BLD AUTO: 1.6 10E3/UL (ref 1.6–8.3)
NEUTROPHILS NFR BLD AUTO: 58 %
NRBC # BLD AUTO: 0 10E3/UL
NRBC BLD AUTO-RTO: 0 /100
PHOSPHATE SERPL-MCNC: 4.4 MG/DL (ref 2.5–4.5)
PLATELET # BLD AUTO: 120 10E3/UL (ref 150–450)
POTASSIUM BLD-SCNC: 4.6 MMOL/L (ref 3.4–5.3)
PROT SERPL-MCNC: 5.9 G/DL (ref 6.8–8.8)
RBC # BLD AUTO: 2.65 10E6/UL (ref 3.8–5.2)
SODIUM SERPL-SCNC: 143 MMOL/L (ref 133–144)
TACROLIMUS BLD-MCNC: 7.5 UG/L (ref 5–15)
TME LAST DOSE: NORMAL H
TME LAST DOSE: NORMAL H
WBC # BLD AUTO: 2.7 10E3/UL (ref 4–11)

## 2023-03-27 PROCEDURE — 80053 COMPREHEN METABOLIC PANEL: CPT

## 2023-03-27 PROCEDURE — 85025 COMPLETE CBC W/AUTO DIFF WBC: CPT

## 2023-03-27 PROCEDURE — 82248 BILIRUBIN DIRECT: CPT

## 2023-03-27 PROCEDURE — 84100 ASSAY OF PHOSPHORUS: CPT

## 2023-03-27 PROCEDURE — 36415 COLL VENOUS BLD VENIPUNCTURE: CPT

## 2023-03-27 PROCEDURE — 80197 ASSAY OF TACROLIMUS: CPT

## 2023-03-27 PROCEDURE — 83735 ASSAY OF MAGNESIUM: CPT

## 2023-03-28 NOTE — PROGRESS NOTES
[facility] Dermatology  High Risk Non-Melanoma Skin Cancer Clinic Initial Consult Note  Encounter Date: Mar 29, 2023  Office Visit     Dermatology Problem List:  1. Hx liver transplant 11/2/2022  2. NUB right lateral knee  - Biopsy 3/29/2023 pending  3. Benign skin findings: nevi, SK, hemangiomas  ____________________________________________    Assessment & Plan:     # History of Liver solid organ transplantation in the setting of CUETO cirrhosis.   - We reviewed with the patient that due to the immunosuppressive medications used following transplant, solid organ transplant recipients are at a roughly 65-fold increased risk of squamous cell carcinoma, 20-fold increased risk of basal cell carcinoma, and 3.4 fold increased risk of melanoma compared to the general population.   - Based on the patient's risk factors and the Skin and Ultraviolet Neoplasia Transplant Risk Assessment Tool, the patient's risk of skin cancer following transplant is categorized as:  - Medium Risk: 5-Year Risk of 6.15% and 10-Year Risk of 13.73%  - We briefly reviewed prevention methods for reducing skin cancer after transplantation including avoidance of sun exposure, avoidance of indoor tanning beds or other indoor tanning devices, use of protective clothing (e.g. wide-brimmed hats, long sleeves, long pants), SPF 30 or greater sunscreen, and UV-protective sunglasses when outdoors.   - We discussed self skin examinations and partner-skin examinations.     # Benign lesions: Multiple benign nevi, solar lentigos, seborrheic keratoses, cherry angiomas. Explained to patient benign nature of lesion. No treatment is necessary at this time unless the lesion changes or becomes symptomatic.   - ABCDs of melanoma were discussed and self skin checks were advised.  - Sun precaution was advised including the use of sun screens of SPF 30 or higher, sun protective clothing, and avoidance of tanning beds.    # Inflamed SK, right nasal  sidewall  - Cryotherapy as below    # NUB, right lateral knee  - Shave biopsy as below, will send to pathology    Procedures Performed:   - Cryotherapy procedure note, location(s): right nasal bridge inflamed SK. After verbal consent and discussion of risks and benefits including, but not limited to, dyspigmentation/scar, blister, and pain, 1 lesion(s) was(were) treated with 1-2 mm freeze border for 1-2 cycles with liquid nitrogen. Post cryotherapy instructions were provided.    - Shave biopsy procedure note, location(s): right lateral knee NUB. After discussion of benefits and risks including but not limited to bleeding, infection, scar, incomplete removal, recurrence, and non-diagnostic biopsy, written consent and photographs were obtained. The area was cleaned with isopropyl alcohol. 0.5mL of 1% lidocaine with epinephrine was injected to obtain adequate anesthesia of lesion(s). Shave biopsy at site(s) performed. Hemostasis was achieved with aluminium chloride. Petrolatum ointment and a sterile dressing were applied. The patient tolerated the procedure and no complications were noted. The patient was provided with verbal and written post care instructions.     Follow-up: 1 year(s) in-person, or earlier for new or changing lesions    Staff and Resident:      Scribe Disclosure:  I, DINESH BELTRAN, am serving as a scribe to document services personally performed by Jeff Rivers MD based on data collection and the provider's statements to me.     Dior Farooq MD  Medicine-Pediatrics PGY-2    Provider Disclosure:   The documentation recorded by the scribe accurately reflects the services I personally performed and the decisions made by me.    Staff Physician Comments:   I saw and evaluated the patient with the resident and I agree with the assessment and plan.  I was present for the entire minor procedure, key portions of the above major procedure and examination.    Jeff Rivers MD  Pronouns:  he/him/his    Department of Dermatology  [facility]: Phone: [phone], Fax:[phone]  [facility]: Phone: [phone] Fax: [phone]  ____________________________________________    CC: Skin Check (Areas of concern include mole on R leg and a red angie on chest area)      HPI:  Ms. Susan Puckett is a(n) 52 year old female who presents today as a new patient for transplant skin check.    Two areas of concern: red bump on central chest that has been there for a few years and sometimes becomes itchy and painful, and right lateral knee with mole that she accidentally shaves off with her razor but it continues growing back.    Patient is otherwise feeling well, without additional skin concerns.    Type of Organ Transplant: Liver  Reason for Transplant: CUETO cirrhosis  Date of Transplant: 11/02/2022  Immunosuppression Regimen: tacrolimus (previously on mycophenylate)    Personal History of Skin Cancer:  Every been diagnosed with the following:   IF YES, (if known) indicate type, body location, year diagnosed, and treatment.    - Pre-skin cancers (e.g. actinic keratoses): NO  - Atypical nevi: NO  - Keratinocyte carcinomas (basal cell carcinoma or squamous cell carcinoma): NO  - Melanoma: NO  - Other type(s) of skin cancer: NO     Family History of Skin Cancer:  Any first-degree relative relative(s) diagnosed with the following:  IF YES, (if known) indicate relationship, type, and age at diagnosis.     - Keratinocyte carcinomas (basal cell carcinoma or squamous cell carcinoma): NO  - Melanoma: NO  - Other type of skin cancer: NO    Skin Cancer Review of Systems:  - Fair skin (Zuñiga Type I or II): YES  - Blue, green or cadence eyes: NO  - Light or red hair: NO  - Male sex assigned at birth: NO  - History of blistering sunburns: YES  - Use of indoor tanning devices or sunlamps: Yes -  Former   - If YES, indoor tanning device or sunlamp? Tanning bed   - If YES, approximate number of lifetime sessions: 30   - If YES, were these used before the age of 35?: yes, all  - Smoking History: No  - If YES, what is the pack-year smoking history?  - Ever lived in a sub-tropical region? No  - Any history of arsenic exposure (e.g. well water)? No  - Any history of viral warts? No  - Have you received the HPV vaccine? NO  - Any occupational exposure to ultraviolet light exposure (e.g. construction work, agricultural work, ): No   - If YES, what was the profession?     Medication Review:  Is the patient currently taking any of the following medications?   IF YES, indicate which medication (if necessary).     - TNF-alpha inhibitor (e.g. etanercept, adalimumab, infliximab): NO  - Tetracycline antibiotic (e.g. minocycline, doxycycline, tetracycline): NO  - Thiazide-containing anti-hypertensive (e.g. hydrochlorothiazide): NO  - Angiotensin receptor blocker (e.g. losartan): NO  - Sulfonylurea (e.g. glipizide): NO  - Amiodarone: NO  - Diltiazem: NO  - Voriconazole: NO     Labs Reviewed:  03/27/2023 labs reviewed    Physical Exam:  Vitals: There were no vitals taken for this visit.  SKIN: Full skin, which includes the head/face, both arms, chest, back, abdomen,both legs, genitalia and/or groin buttocks, digits and/or nails, was examined.  - Central chest with dome shaped red papule  - Right mid forehead with dome shaped red papule  - 2mm dark brown macule to mid lower lip just above vermilion border  - There is a waxy stuck on tan to brown papule on the right nasal bridge.   - Left anterior thigh with 8x3mm multicolored (light and dark brown) asymmetrical macule with regular reticular network  - Right lateral knee with flesh colored papule with punctate area of dark brown pigment   - Multiple regular brown pigmented macules and papules are identified on the trunk and limbs.   - There are fine lines and  dyspigmentation on sun exposed areas of the face and chest.  - Scattered brown macules on sun exposed areas..   - Abdominal scars pink and well healed.  - No other lesions of concern on areas examined.     Medications:  Current Outpatient Medications   Medication      magnesium glycinate lysinate chelate (DOCTOR'S BEST) 100 MG TABS tablet     acetaminophen (TYLENOL) 325 MG tablet     amylase-lipase-protease (CREON 24) 56149-92861 units CPEP per EC capsule     amylase-lipase-protease (CREON 24) 58506-26041 units CPEP per EC capsule     aspirin (ASA) 81 MG chewable tablet     calcium carbonate-vitamin D (OSCAL) 500-5 MG-MCG tablet     Continuous Blood Gluc  (DEXCOM G6 ) LUNA     Continuous Blood Gluc Sensor (DEXCOM G6 SENSOR) MISC     Continuous Blood Gluc Transmit (DEXCOM G6 TRANSMITTER) MISC     cyanocobalamin (VITAMIN B-12) 1000 MCG tablet     dapsone (ACZONE) 25 MG tablet     docusate sodium (COLACE) 100 MG capsule     escitalopram (LEXAPRO) 20 MG tablet     folic acid (FOLVITE) 1 MG tablet     gabapentin (NEURONTIN) 100 MG capsule     levothyroxine (SYNTHROID/LEVOTHROID) 125 MCG tablet     LORazepam (ATIVAN) 0.5 MG tablet     melatonin 5 MG tablet     multivitamin CF FORMULA (DEKAS PLUS) capsule     ondansetron (ZOFRAN ODT) 8 MG ODT tab     polyethylene glycol (MIRALAX) 17 GM/Dose powder     sitagliptin (JANUVIA) 100 MG tablet     SUMAtriptan (IMITREX) 20 MG/ACT nasal spray     tacrolimus (GENERIC EQUIVALENT) 0.5 MG capsule     tacrolimus (GENERIC EQUIVALENT) 1 MG capsule     tacrolimus (GENERIC EQUIVALENT) 5 MG capsule     topiramate (TOPAMAX) 25 MG tablet     traZODone (DESYREL) 50 MG tablet     No current facility-administered medications for this visit.      Past Medical History:   Patient Active Problem List   Diagnosis     Anxiety     Ascites     Benign tumor of colon     Brow ptosis, bilateral     Chronic diarrhea     Chronic peritoneal effusion     Colitis     Endometriosis     Hepatic  encephalopathy     History of gastric bypass     Insomnia     Hypothyroidism     HSV-1 (herpes simplex virus 1) infection     Iron deficiency anemia     Raynaud phenomenon     Ptosis of eyelid     Pleural effusion associated with hepatic disorder     Thrombocytopenia (H)     Other headache syndrome     Nausea     Major depressive disorder, recurrent episode, moderate (H)     Infertility management     History of gastric ulcer     Allergic rhinitis     Tubular adenoma of colon     Vitamin D deficiency     Visual field defect     Type 2 diabetes mellitus without complication, without long-term current use of insulin (H)     Primary hypothyroidism     H/O gastric bypass     Exposure to COVID-19 virus     Abdominal pain, epigastric     Steroid-induced hyperglycemia     Hypomagnesemia     Prolonged Q-T interval on ECG     Liver transplanted (H)     Acute kidney failure with tubular necrosis (H)     Hypervolemia     Immunosuppressed status (H)     Anemia in other chronic diseases classified elsewhere     Hyponatremia     Hypoalbuminemia     GEORGETTE (acute kidney injury) (H)     Acute post-operative pain     Seizure due to hypoglycemia (H)     Migraine     Moderate malnutrition (H)     Pancreatic insufficiency     Constipation     Hypophosphatemia     Generalized weakness     Status post liver transplantation (H)     Bilateral leg pain     Acute cystitis without hematuria     Bilateral lower extremity edema     Urinary tract infection     Generalized abdominal pain     Vomiting and diarrhea     Leukopenia     RUQ abdominal pain     Drug-induced neutropenia (H)     Past Medical History:   Diagnosis Date     Anemia      Anxiety      Cold sore      Depressive disorder      Diabetes (H)     Type 2 DM/No Insulin      Encephalopathy      Esophageal varices without bleeding (H)     grade I     History of blood transfusion     10/2019     History of seizure     diabetic seizure     Insomnia      Liver cirrhosis secondary to CUETO (H)  05/28/2020     Migraine      Pleural effusion      Thrombocytopenia (H)      Thyroid disease        CC Raphael Cook MD  [address] [place] [address] on close of this encounter.

## 2023-03-29 ENCOUNTER — OFFICE VISIT (OUTPATIENT)
Dept: DERMATOLOGY | Facility: CLINIC | Age: 53
End: 2023-03-29
Payer: COMMERCIAL

## 2023-03-29 DIAGNOSIS — D22.9 MULTIPLE BENIGN NEVI: ICD-10-CM

## 2023-03-29 DIAGNOSIS — L82.1 SEBORRHEIC KERATOSIS: ICD-10-CM

## 2023-03-29 DIAGNOSIS — D18.01 CHERRY ANGIOMA: ICD-10-CM

## 2023-03-29 DIAGNOSIS — D84.9 IMMUNOSUPPRESSED STATUS (H): ICD-10-CM

## 2023-03-29 DIAGNOSIS — L82.0 INFLAMED SEBORRHEIC KERATOSIS: ICD-10-CM

## 2023-03-29 DIAGNOSIS — Z94.4 LIVER REPLACED BY TRANSPLANT (H): Primary | ICD-10-CM

## 2023-03-29 DIAGNOSIS — L81.4 SOLAR LENTIGO: ICD-10-CM

## 2023-03-29 DIAGNOSIS — D49.2 NEOPLASM OF UNSPECIFIED BEHAVIOR OF BONE, SOFT TISSUE, AND SKIN: ICD-10-CM

## 2023-03-29 PROCEDURE — 88305 TISSUE EXAM BY PATHOLOGIST: CPT | Mod: TC | Performed by: DERMATOLOGY

## 2023-03-29 PROCEDURE — 99213 OFFICE O/P EST LOW 20 MIN: CPT | Mod: 25 | Performed by: DERMATOLOGY

## 2023-03-29 PROCEDURE — 88305 TISSUE EXAM BY PATHOLOGIST: CPT | Mod: 26 | Performed by: DERMATOLOGY

## 2023-03-29 PROCEDURE — 17110 DESTRUCTION B9 LES UP TO 14: CPT | Mod: GC | Performed by: DERMATOLOGY

## 2023-03-29 PROCEDURE — 11102 TANGNTL BX SKIN SINGLE LES: CPT | Mod: XS | Performed by: DERMATOLOGY

## 2023-03-29 ASSESSMENT — PAIN SCALES - GENERAL: PAINLEVEL: NO PAIN (0)

## 2023-03-29 NOTE — LETTER
3/29/2023       RE: Susan Puckett  [address] [address]     Dear Colleague,    Thank you for referring your patient, Susan Puckett, to the [facility] DERMATOLOGY CLINIC [place] at [facility]. Please see a copy of my visit note below.    [facility] Dermatology  High Risk Non-Melanoma Skin Cancer Clinic Initial Consult Note  Encounter Date: Mar 29, 2023  Office Visit     Dermatology Problem List:  1. Hx liver transplant 11/2/2022  2. NUB right lateral knee  - Biopsy 3/29/2023 pending  3. Benign skin findings: nevi, SK, hemangiomas  ____________________________________________    Assessment & Plan:     # History of Liver solid organ transplantation in the setting of CUETO cirrhosis.   - We reviewed with the patient that due to the immunosuppressive medications used following transplant, solid organ transplant recipients are at a roughly 65-fold increased risk of squamous cell carcinoma, 20-fold increased risk of basal cell carcinoma, and 3.4 fold increased risk of melanoma compared to the general population.   - Based on the patient's risk factors and the Skin and Ultraviolet Neoplasia Transplant Risk Assessment Tool, the patient's risk of skin cancer following transplant is categorized as:  - Medium Risk: 5-Year Risk of 6.15% and 10-Year Risk of 13.73%  - We briefly reviewed prevention methods for reducing skin cancer after transplantation including avoidance of sun exposure, avoidance of indoor tanning beds or other indoor tanning devices, use of protective clothing (e.g. wide-brimmed hats, long sleeves, long pants), SPF 30 or greater sunscreen, and UV-protective sunglasses when outdoors.   - We discussed self skin examinations and partner-skin examinations.     # Benign lesions: Multiple benign nevi, solar lentigos, seborrheic keratoses, cherry angiomas. Explained to patient benign nature of lesion. No treatment is  necessary at this time unless the lesion changes or becomes symptomatic.   - ABCDs of melanoma were discussed and self skin checks were advised.  - Sun precaution was advised including the use of sun screens of SPF 30 or higher, sun protective clothing, and avoidance of tanning beds.    # Inflamed SK, right nasal sidewall  - Cryotherapy as below    # NUB, right lateral knee  - Shave biopsy as below, will send to pathology    Procedures Performed:   - Cryotherapy procedure note, location(s): right nasal bridge inflamed SK. After verbal consent and discussion of risks and benefits including, but not limited to, dyspigmentation/scar, blister, and pain, 1 lesion(s) was(were) treated with 1-2 mm freeze border for 1-2 cycles with liquid nitrogen. Post cryotherapy instructions were provided.    - Shave biopsy procedure note, location(s): right lateral knee NUB. After discussion of benefits and risks including but not limited to bleeding, infection, scar, incomplete removal, recurrence, and non-diagnostic biopsy, written consent and photographs were obtained. The area was cleaned with isopropyl alcohol. 0.5mL of 1% lidocaine with epinephrine was injected to obtain adequate anesthesia of lesion(s). Shave biopsy at site(s) performed. Hemostasis was achieved with aluminium chloride. Petrolatum ointment and a sterile dressing were applied. The patient tolerated the procedure and no complications were noted. The patient was provided with verbal and written post care instructions.     Follow-up: 1 year(s) in-person, or earlier for new or changing lesions    Staff and Resident:      Scribe Disclosure:  DINESH OLIVAS, am serving as a scribe to document services personally performed by Jeff Rivers MD based on data collection and the provider's statements to me.     Dior Farooq MD  Medicine-Pediatrics PGY-2    Provider Disclosure:   The documentation recorded by the scribe accurately reflects the services I personally  performed and the decisions made by me.    Staff Physician Comments:   I saw and evaluated the patient with the resident and I agree with the assessment and plan.  I was present for the entire minor procedure, key portions of the above major procedure and examination.    Jeff Rivers MD  Pronouns: he/him/his    Department of Dermatology  [facility]: Phone: [phone], Fax:[phone]  [facility]: Phone: [phone] Fax: [phone]  ____________________________________________    CC: Skin Check (Areas of concern include mole on R leg and a red angie on chest area)      HPI:  Ms. Susan Puckett is a(n) 52 year old female who presents today as a new patient for transplant skin check.    Two areas of concern: red bump on central chest that has been there for a few years and sometimes becomes itchy and painful, and right lateral knee with mole that she accidentally shaves off with her razor but it continues growing back.    Patient is otherwise feeling well, without additional skin concerns.    Type of Organ Transplant: Liver  Reason for Transplant: CUETO cirrhosis  Date of Transplant: 11/02/2022  Immunosuppression Regimen: tacrolimus (previously on mycophenylate)    Personal History of Skin Cancer:  Every been diagnosed with the following:   IF YES, (if known) indicate type, body location, year diagnosed, and treatment.    - Pre-skin cancers (e.g. actinic keratoses): NO  - Atypical nevi: NO  - Keratinocyte carcinomas (basal cell carcinoma or squamous cell carcinoma): NO  - Melanoma: NO  - Other type(s) of skin cancer: NO     Family History of Skin Cancer:  Any first-degree relative relative(s) diagnosed with the following:  IF YES, (if known) indicate relationship, type, and age at diagnosis.     - Keratinocyte carcinomas (basal cell carcinoma or squamous cell carcinoma): NO  - Melanoma:  NO  - Other type of skin cancer: NO    Skin Cancer Review of Systems:  - Fair skin (Zuñiga Type I or II): YES  - Blue, green or cadence eyes: NO  - Light or red hair: NO  - Male sex assigned at birth: NO  - History of blistering sunburns: YES  - Use of indoor tanning devices or sunlamps: Yes - Former   - If YES, indoor tanning device or sunlamp? Tanning bed   - If YES, approximate number of lifetime sessions: 30   - If YES, were these used before the age of 35?: yes, all  - Smoking History: No  - If YES, what is the pack-year smoking history?  - Ever lived in a sub-tropical region? No  - Any history of arsenic exposure (e.g. well water)? No  - Any history of viral warts? No  - Have you received the HPV vaccine? NO  - Any occupational exposure to ultraviolet light exposure (e.g. construction work, agricultural work, ): No   - If YES, what was the profession?     Medication Review:  Is the patient currently taking any of the following medications?   IF YES, indicate which medication (if necessary).     - TNF-alpha inhibitor (e.g. etanercept, adalimumab, infliximab): NO  - Tetracycline antibiotic (e.g. minocycline, doxycycline, tetracycline): NO  - Thiazide-containing anti-hypertensive (e.g. hydrochlorothiazide): NO  - Angiotensin receptor blocker (e.g. losartan): NO  - Sulfonylurea (e.g. glipizide): NO  - Amiodarone: NO  - Diltiazem: NO  - Voriconazole: NO     Labs Reviewed:  03/27/2023 labs reviewed    Physical Exam:  Vitals: There were no vitals taken for this visit.  SKIN: Full skin, which includes the head/face, both arms, chest, back, abdomen,both legs, genitalia and/or groin buttocks, digits and/or nails, was examined.  - Central chest with dome shaped red papule  - Right mid forehead with dome shaped red papule  - 2mm dark brown macule to mid lower lip just above vermilion border  - There is a waxy stuck on tan to brown papule on the right nasal bridge.   - Left anterior thigh with 8x3mm  multicolored (light and dark brown) asymmetrical macule with regular reticular network  - Right lateral knee with flesh colored papule with punctate area of dark brown pigment   - Multiple regular brown pigmented macules and papules are identified on the trunk and limbs.   - There are fine lines and dyspigmentation on sun exposed areas of the face and chest.  - Scattered brown macules on sun exposed areas..   - Abdominal scars pink and well healed.  - No other lesions of concern on areas examined.     Medications:  Current Outpatient Medications   Medication      magnesium glycinate lysinate chelate (DOCTOR'S BEST) 100 MG TABS tablet     acetaminophen (TYLENOL) 325 MG tablet     amylase-lipase-protease (CREON 24) 43837-18520 units CPEP per EC capsule     amylase-lipase-protease (CREON 24) 68237-61247 units CPEP per EC capsule     aspirin (ASA) 81 MG chewable tablet     calcium carbonate-vitamin D (OSCAL) 500-5 MG-MCG tablet     Continuous Blood Gluc  (DEXCOM G6 ) LUNA     Continuous Blood Gluc Sensor (DEXCOM G6 SENSOR) MISC     Continuous Blood Gluc Transmit (DEXCOM G6 TRANSMITTER) MISC     cyanocobalamin (VITAMIN B-12) 1000 MCG tablet     dapsone (ACZONE) 25 MG tablet     docusate sodium (COLACE) 100 MG capsule     escitalopram (LEXAPRO) 20 MG tablet     folic acid (FOLVITE) 1 MG tablet     gabapentin (NEURONTIN) 100 MG capsule     levothyroxine (SYNTHROID/LEVOTHROID) 125 MCG tablet     LORazepam (ATIVAN) 0.5 MG tablet     melatonin 5 MG tablet     multivitamin CF FORMULA (DEKAS PLUS) capsule     ondansetron (ZOFRAN ODT) 8 MG ODT tab     polyethylene glycol (MIRALAX) 17 GM/Dose powder     sitagliptin (JANUVIA) 100 MG tablet     SUMAtriptan (IMITREX) 20 MG/ACT nasal spray     tacrolimus (GENERIC EQUIVALENT) 0.5 MG capsule     tacrolimus (GENERIC EQUIVALENT) 1 MG capsule     tacrolimus (GENERIC EQUIVALENT) 5 MG capsule     topiramate (TOPAMAX) 25 MG tablet     traZODone (DESYREL) 50 MG tablet     No  current facility-administered medications for this visit.      Past Medical History:   Patient Active Problem List   Diagnosis     Anxiety     Ascites     Benign tumor of colon     Brow ptosis, bilateral     Chronic diarrhea     Chronic peritoneal effusion     Colitis     Endometriosis     Hepatic encephalopathy     History of gastric bypass     Insomnia     Hypothyroidism     HSV-1 (herpes simplex virus 1) infection     Iron deficiency anemia     Raynaud phenomenon     Ptosis of eyelid     Pleural effusion associated with hepatic disorder     Thrombocytopenia (H)     Other headache syndrome     Nausea     Major depressive disorder, recurrent episode, moderate (H)     Infertility management     History of gastric ulcer     Allergic rhinitis     Tubular adenoma of colon     Vitamin D deficiency     Visual field defect     Type 2 diabetes mellitus without complication, without long-term current use of insulin (H)     Primary hypothyroidism     H/O gastric bypass     Exposure to COVID-19 virus     Abdominal pain, epigastric     Steroid-induced hyperglycemia     Hypomagnesemia     Prolonged Q-T interval on ECG     Liver transplanted (H)     Acute kidney failure with tubular necrosis (H)     Hypervolemia     Immunosuppressed status (H)     Anemia in other chronic diseases classified elsewhere     Hyponatremia     Hypoalbuminemia     GEORGETTE (acute kidney injury) (H)     Acute post-operative pain     Seizure due to hypoglycemia (H)     Migraine     Moderate malnutrition (H)     Pancreatic insufficiency     Constipation     Hypophosphatemia     Generalized weakness     Status post liver transplantation (H)     Bilateral leg pain     Acute cystitis without hematuria     Bilateral lower extremity edema     Urinary tract infection     Generalized abdominal pain     Vomiting and diarrhea     Leukopenia     RUQ abdominal pain     Drug-induced neutropenia (H)     Past Medical History:   Diagnosis Date     Anemia      Anxiety       Cold sore      Depressive disorder      Diabetes (H)     Type 2 DM/No Insulin      Encephalopathy      Esophageal varices without bleeding (H)     grade I     History of blood transfusion     10/2019     History of seizure     diabetic seizure     Insomnia      Liver cirrhosis secondary to CUETO (H) 05/28/2020     Migraine      Pleural effusion      Thrombocytopenia (H)      Thyroid disease        CC Raphael Cook MD  [address] [place] [address] on close of this encounter.

## 2023-03-29 NOTE — NURSING NOTE
Lidocaine-epinephrine 1-1:014699 % injection   1.0mL once for one use, starting 3/29/2023 ending 3/29/2023,  2mL disp, R-0, injection  Injected by Dr. Rivers

## 2023-03-29 NOTE — NURSING NOTE
Chief Complaint   Patient presents with     Skin Check     Areas of concern include mole on R leg and a red angie on chest area     Mart Alatorre, EMT

## 2023-03-30 LAB
PATH REPORT.COMMENTS IMP SPEC: NORMAL
PATH REPORT.COMMENTS IMP SPEC: NORMAL
PATH REPORT.FINAL DX SPEC: NORMAL
PATH REPORT.GROSS SPEC: NORMAL
PATH REPORT.MICROSCOPIC SPEC OTHER STN: NORMAL
PATH REPORT.RELEVANT HX SPEC: NORMAL

## 2023-04-10 ENCOUNTER — LAB (OUTPATIENT)
Dept: LAB | Facility: CLINIC | Age: 53
End: 2023-04-10
Payer: COMMERCIAL

## 2023-04-10 ENCOUNTER — TELEPHONE (OUTPATIENT)
Dept: TRANSPLANT | Facility: CLINIC | Age: 53
End: 2023-04-10

## 2023-04-10 DIAGNOSIS — Z94.4 LIVER REPLACED BY TRANSPLANT (H): Primary | ICD-10-CM

## 2023-04-10 DIAGNOSIS — E87.5 HYPERKALEMIA: Primary | ICD-10-CM

## 2023-04-10 DIAGNOSIS — Z94.4 LIVER REPLACED BY TRANSPLANT (H): ICD-10-CM

## 2023-04-10 DIAGNOSIS — D64.9 LOW HEMOGLOBIN: ICD-10-CM

## 2023-04-10 DIAGNOSIS — Z94.4 LIVER TRANSPLANTED (H): ICD-10-CM

## 2023-04-10 LAB
ALBUMIN SERPL-MCNC: 3 G/DL (ref 3.4–5)
ALP SERPL-CCNC: 97 U/L (ref 40–150)
ALT SERPL W P-5'-P-CCNC: 23 U/L (ref 0–50)
ANION GAP SERPL CALCULATED.3IONS-SCNC: 1 MMOL/L (ref 3–14)
AST SERPL W P-5'-P-CCNC: 22 U/L (ref 0–45)
BASOPHILS # BLD AUTO: 0 10E3/UL (ref 0–0.2)
BASOPHILS NFR BLD AUTO: 0 %
BILIRUB DIRECT SERPL-MCNC: 0.2 MG/DL (ref 0–0.2)
BILIRUB SERPL-MCNC: 0.5 MG/DL (ref 0.2–1.3)
BUN SERPL-MCNC: 22 MG/DL (ref 7–30)
CALCIUM SERPL-MCNC: 7.9 MG/DL (ref 8.5–10.1)
CHLORIDE BLD-SCNC: 113 MMOL/L (ref 94–109)
CO2 SERPL-SCNC: 27 MMOL/L (ref 20–32)
CREAT SERPL-MCNC: 1.16 MG/DL (ref 0.52–1.04)
EOSINOPHIL # BLD AUTO: 0.1 10E3/UL (ref 0–0.7)
EOSINOPHIL NFR BLD AUTO: 4 %
ERYTHROCYTE [DISTWIDTH] IN BLOOD BY AUTOMATED COUNT: 14.2 % (ref 10–15)
GFR SERPL CREATININE-BSD FRML MDRD: 57 ML/MIN/1.73M2
GLUCOSE BLD-MCNC: 160 MG/DL (ref 70–99)
HCT VFR BLD AUTO: 28.2 % (ref 35–47)
HGB BLD-MCNC: 8.6 G/DL (ref 11.7–15.7)
IMM GRANULOCYTES # BLD: 0 10E3/UL
IMM GRANULOCYTES NFR BLD: 0 %
LYMPHOCYTES # BLD AUTO: 0.9 10E3/UL (ref 0.8–5.3)
LYMPHOCYTES NFR BLD AUTO: 32 %
MAGNESIUM SERPL-MCNC: 1.9 MG/DL (ref 1.6–2.3)
MCH RBC QN AUTO: 32.6 PG (ref 26.5–33)
MCHC RBC AUTO-ENTMCNC: 30.5 G/DL (ref 31.5–36.5)
MCV RBC AUTO: 107 FL (ref 78–100)
MONOCYTES # BLD AUTO: 0.2 10E3/UL (ref 0–1.3)
MONOCYTES NFR BLD AUTO: 7 %
NEUTROPHILS # BLD AUTO: 1.5 10E3/UL (ref 1.6–8.3)
NEUTROPHILS NFR BLD AUTO: 57 %
NRBC # BLD AUTO: 0 10E3/UL
NRBC BLD AUTO-RTO: 0 /100
PHOSPHATE SERPL-MCNC: 4.9 MG/DL (ref 2.5–4.5)
PLATELET # BLD AUTO: 128 10E3/UL (ref 150–450)
POTASSIUM BLD-SCNC: 6 MMOL/L (ref 3.4–5.3)
PROT SERPL-MCNC: 5.9 G/DL (ref 6.8–8.8)
RBC # BLD AUTO: 2.64 10E6/UL (ref 3.8–5.2)
SODIUM SERPL-SCNC: 141 MMOL/L (ref 133–144)
TACROLIMUS BLD-MCNC: 9.3 UG/L (ref 5–15)
TME LAST DOSE: NORMAL H
TME LAST DOSE: NORMAL H
WBC # BLD AUTO: 2.7 10E3/UL (ref 4–11)

## 2023-04-10 PROCEDURE — 82248 BILIRUBIN DIRECT: CPT

## 2023-04-10 PROCEDURE — 80053 COMPREHEN METABOLIC PANEL: CPT

## 2023-04-10 PROCEDURE — 84100 ASSAY OF PHOSPHORUS: CPT

## 2023-04-10 PROCEDURE — 36415 COLL VENOUS BLD VENIPUNCTURE: CPT

## 2023-04-10 PROCEDURE — 85025 COMPLETE CBC W/AUTO DIFF WBC: CPT

## 2023-04-10 PROCEDURE — 80197 ASSAY OF TACROLIMUS: CPT

## 2023-04-10 PROCEDURE — 83735 ASSAY OF MAGNESIUM: CPT

## 2023-04-10 RX ORDER — TACROLIMUS 1 MG/1
CAPSULE ORAL
Qty: 630 CAPSULE | Refills: 11 | Status: SHIPPED | OUTPATIENT
Start: 2023-04-10 | End: 2023-11-07

## 2023-04-10 NOTE — PROGRESS NOTES
"Hgb 8.6 today, sent to provider to review/advise.    \"Raphael Cook MD Mears, Sarah L, RN  Ferritin, iron saturation, haptoglobin, Vit B12, folate levels and reticulocyte counts.\"      Labs ordered, [employer] message sent to patient to let her know to get these labs done within next 2 weeks.     "

## 2023-04-10 NOTE — TELEPHONE ENCOUNTER
"K 6.0 today. Message to Dr Cook to review/advise.    Per Dr Cook:    \"Repeat it\"      Spoke to Susan, reviewed elevated K level. Instructed on a low K diet, increasing fluid intake, and repeat K level tomorrow. Pt agrees with plan.     In addition, reviewed Hgb 8.6, message to Dr. Cook to review/advise if patient should be referred to anemia services.       "

## 2023-04-10 NOTE — TELEPHONE ENCOUNTER
ISSUE:   Tacrolimus IR level 9.3 on 4/10, goal 6-10, dose 4 mg AM, 5 mg PM.    PLAN:   Please call patient and confirm this was an accurate 12-hour trough. Verify Tacrolimus IR dose 4 mg AM, 5 mg PM. Confirm no new medications or illness. Confirm no missed doses. If accurate trough and accurate dose, decrease Tacrolimus IR dose to 3 mg AM, 4 mg PM and repeat labs in 2 weeks.    OUTCOME:   Spoke with patient, they confirm accurate trough level and current dose 4 mg AM 5 mg PM. Patient confirmed dose change to 3 mg AM, 4 mg PM and to repeat labs in 2 weeks. Orders sent to preferred pharmacy for dose change and lab for repeat labs. Patient voiced understanding of plan.     Plan to get K and iron studies drawn tomorrow at 10 am.

## 2023-04-11 ENCOUNTER — TELEPHONE (OUTPATIENT)
Dept: TRANSPLANT | Facility: CLINIC | Age: 53
End: 2023-04-11

## 2023-04-11 ENCOUNTER — LAB (OUTPATIENT)
Dept: LAB | Facility: CLINIC | Age: 53
End: 2023-04-11
Payer: COMMERCIAL

## 2023-04-11 DIAGNOSIS — E87.5 HYPERKALEMIA: Primary | ICD-10-CM

## 2023-04-11 DIAGNOSIS — D64.9 LOW HEMOGLOBIN: ICD-10-CM

## 2023-04-11 DIAGNOSIS — Z94.4 LIVER REPLACED BY TRANSPLANT (H): ICD-10-CM

## 2023-04-11 DIAGNOSIS — E87.5 HYPERKALEMIA: ICD-10-CM

## 2023-04-11 LAB
ALBUMIN SERPL-MCNC: 3.3 G/DL (ref 3.4–5)
ALP SERPL-CCNC: 101 U/L (ref 40–150)
ALT SERPL W P-5'-P-CCNC: 28 U/L (ref 0–50)
ANION GAP SERPL CALCULATED.3IONS-SCNC: <1 MMOL/L (ref 3–14)
AST SERPL W P-5'-P-CCNC: 34 U/L (ref 0–45)
BILIRUB DIRECT SERPL-MCNC: 0.1 MG/DL (ref 0–0.2)
BILIRUB SERPL-MCNC: 0.5 MG/DL (ref 0.2–1.3)
BUN SERPL-MCNC: 20 MG/DL (ref 7–30)
CALCIUM SERPL-MCNC: 8 MG/DL (ref 8.5–10.1)
CHLORIDE BLD-SCNC: 116 MMOL/L (ref 94–109)
CO2 SERPL-SCNC: 26 MMOL/L (ref 20–32)
CREAT SERPL-MCNC: 1.17 MG/DL (ref 0.52–1.04)
ERYTHROCYTE [DISTWIDTH] IN BLOOD BY AUTOMATED COUNT: 14 % (ref 10–15)
FERRITIN SERPL-MCNC: 86 NG/ML (ref 8–252)
FOLATE SERPL-MCNC: NORMAL NG/ML
GFR SERPL CREATININE-BSD FRML MDRD: 57 ML/MIN/1.73M2
GLUCOSE BLD-MCNC: 133 MG/DL (ref 70–99)
HCT VFR BLD AUTO: 29.8 % (ref 35–47)
HGB BLD-MCNC: 9 G/DL (ref 11.7–15.7)
IRON SATN MFR SERPL: 35 % (ref 15–46)
IRON SERPL-MCNC: 73 UG/DL (ref 35–180)
MAGNESIUM SERPL-MCNC: 1.9 MG/DL (ref 1.6–2.3)
MCH RBC QN AUTO: 32.5 PG (ref 26.5–33)
MCHC RBC AUTO-ENTMCNC: 30.2 G/DL (ref 31.5–36.5)
MCV RBC AUTO: 108 FL (ref 78–100)
PHOSPHATE SERPL-MCNC: 4.1 MG/DL (ref 2.5–4.5)
PLATELET # BLD AUTO: 140 10E3/UL (ref 150–450)
POTASSIUM BLD-SCNC: 5.8 MMOL/L (ref 3.4–5.3)
PROT SERPL-MCNC: 6.5 G/DL (ref 6.8–8.8)
RBC # BLD AUTO: 2.77 10E6/UL (ref 3.8–5.2)
RETICS # AUTO: 0.13 10E6/UL (ref 0.03–0.1)
RETICS/RBC NFR AUTO: 4.8 % (ref 0.5–2)
SODIUM SERPL-SCNC: 142 MMOL/L (ref 133–144)
TIBC SERPL-MCNC: 206 UG/DL (ref 240–430)
VIT B12 SERPL-MCNC: 564 PG/ML (ref 232–1245)
WBC # BLD AUTO: 2.9 10E3/UL (ref 4–11)

## 2023-04-11 PROCEDURE — 82607 VITAMIN B-12: CPT

## 2023-04-11 PROCEDURE — 83735 ASSAY OF MAGNESIUM: CPT

## 2023-04-11 PROCEDURE — 83540 ASSAY OF IRON: CPT

## 2023-04-11 PROCEDURE — 84100 ASSAY OF PHOSPHORUS: CPT

## 2023-04-11 PROCEDURE — 36415 COLL VENOUS BLD VENIPUNCTURE: CPT

## 2023-04-11 PROCEDURE — 82248 BILIRUBIN DIRECT: CPT

## 2023-04-11 PROCEDURE — 85045 AUTOMATED RETICULOCYTE COUNT: CPT

## 2023-04-11 PROCEDURE — 80053 COMPREHEN METABOLIC PANEL: CPT

## 2023-04-11 PROCEDURE — 82728 ASSAY OF FERRITIN: CPT

## 2023-04-11 PROCEDURE — 83010 ASSAY OF HAPTOGLOBIN QUANT: CPT

## 2023-04-11 PROCEDURE — 85027 COMPLETE CBC AUTOMATED: CPT

## 2023-04-11 PROCEDURE — 83550 IRON BINDING TEST: CPT

## 2023-04-11 PROCEDURE — 82746 ASSAY OF FOLIC ACID SERUM: CPT

## 2023-04-11 RX ORDER — FLUDROCORTISONE ACETATE 0.1 MG/1
0.1 TABLET ORAL DAILY
Qty: 90 TABLET | Refills: 3 | Status: SHIPPED | OUTPATIENT
Start: 2023-04-11 | End: 2024-03-02

## 2023-04-11 NOTE — TELEPHONE ENCOUNTER
"Repeat potassium 5.8 today. Message to Dr Cook to review/advise.    Per Dr Cook:    \"Florinef 0.1 mg qday.\"      Spoke to Susan, relayed recommendation from Dr Cook. Florinef prescription sent to [facility] pharmacy.   Upon ordering Florinef, a reaction message popped up that patient has had reactions to Methylprednisolone and Prednisone in the past (hives). Message to Onelia Soto, pharmacy, who states that since patient was on Prednisone post transplant it is low risk she will develop hives from Florinef. Advised that I let patient know of the low risk and to watch for s/s.     Message to Susan letting her know to watch for symptoms and BP.    "

## 2023-04-12 LAB — HAPTOGLOB SERPL-MCNC: <3 MG/DL (ref 32–197)

## 2023-04-13 ENCOUNTER — TELEPHONE (OUTPATIENT)
Dept: TRANSPLANT | Facility: CLINIC | Age: 53
End: 2023-04-13
Payer: COMMERCIAL

## 2023-04-13 ENCOUNTER — PATIENT OUTREACH (OUTPATIENT)
Dept: ONCOLOGY | Facility: CLINIC | Age: 53
End: 2023-04-13
Payer: COMMERCIAL

## 2023-04-13 DIAGNOSIS — Z94.4 LIVER REPLACED BY TRANSPLANT (H): Primary | ICD-10-CM

## 2023-04-13 DIAGNOSIS — D64.9 LOW HEMOGLOBIN: ICD-10-CM

## 2023-04-13 NOTE — TELEPHONE ENCOUNTER
Patient Call: General  Route to LPN    Reason for call: Pt would like a call back to discuss her labs    Call back needed? Yes    Return Call Needed  Same as documented in contacts section  When to return call?: Greater than one day: Route standard priority

## 2023-04-13 NOTE — PROGRESS NOTES
Referral received for benign heme services, see below.    Referral reason: hemolytic anemia - second opinion, patient established with Hematology with Dr Barahona at     Current abnormal labs: Available in CareEverywhere and Available in Chart Review    Outreach: Nandini sent to patient    Plan: Triage instructions updated and sent to NPS for completion.

## 2023-04-14 DIAGNOSIS — Z94.4 LIVER REPLACED BY TRANSPLANT (H): ICD-10-CM

## 2023-04-14 RX ORDER — DAPSONE 25 MG/1
25 TABLET ORAL DAILY
Qty: 30 TABLET | Refills: 0 | Status: SHIPPED | OUTPATIENT
Start: 2023-04-14 | End: 2023-05-10

## 2023-04-17 ENCOUNTER — LAB (OUTPATIENT)
Dept: LAB | Facility: CLINIC | Age: 53
End: 2023-04-17
Payer: COMMERCIAL

## 2023-04-17 DIAGNOSIS — Z94.4 LIVER TRANSPLANTED (H): ICD-10-CM

## 2023-04-17 LAB
ANION GAP SERPL CALCULATED.3IONS-SCNC: 2 MMOL/L (ref 3–14)
BUN SERPL-MCNC: 16 MG/DL (ref 7–30)
CALCIUM SERPL-MCNC: 7.6 MG/DL (ref 8.5–10.1)
CHLORIDE BLD-SCNC: 113 MMOL/L (ref 94–109)
CO2 SERPL-SCNC: 27 MMOL/L (ref 20–32)
CREAT SERPL-MCNC: 0.9 MG/DL (ref 0.52–1.04)
GFR SERPL CREATININE-BSD FRML MDRD: 78 ML/MIN/1.73M2
GLUCOSE BLD-MCNC: 128 MG/DL (ref 70–99)
POTASSIUM BLD-SCNC: 3.8 MMOL/L (ref 3.4–5.3)
SODIUM SERPL-SCNC: 142 MMOL/L (ref 133–144)

## 2023-04-17 PROCEDURE — 36415 COLL VENOUS BLD VENIPUNCTURE: CPT

## 2023-04-17 PROCEDURE — 80048 BASIC METABOLIC PNL TOTAL CA: CPT

## 2023-04-17 NOTE — TELEPHONE ENCOUNTER
RECORDS STATUS - ALL OTHER DIAGNOSIS      RECORDS RECEIVED FROM: Epic, [facility]   DATE RECEIVED:    NOTES STATUS DETAILS   OFFICE NOTE from referring provider [facility] Dr. Raphael Cook   OFFICE NOTE from medical oncologist [facility] 01/30/23: Dr. Kimberly Barahona   DISCHARGE REPORT from the ER CE-NM 02/08/22, 10/10/19: [facility]   OPERATIVE REPORT     MEDICATION LIST [facility]    LABS     PATHOLOGY REPORTS Report in Epic 11/25/20: [id number]   ANYTHING RELATED TO DIAGNOSIS CE- Most recent 04/26/23

## 2023-04-19 ENCOUNTER — TELEPHONE (OUTPATIENT)
Dept: TRANSPLANT | Facility: CLINIC | Age: 53
End: 2023-04-19

## 2023-04-20 NOTE — TELEPHONE ENCOUNTER
Call to Susan. Susan wanted to know if she should continue taking florinef because her potassium is WNL now, advised to continue taking and discuss it with Dr. Cook at her appointment in May.   Susan saw her [facility] hematologist yesterday. [facility] hematology referral cancelled.

## 2023-04-21 ENCOUNTER — DOCUMENTATION ONLY (OUTPATIENT)
Dept: TRANSPLANT | Facility: CLINIC | Age: 53
End: 2023-04-21
Payer: COMMERCIAL

## 2023-04-21 NOTE — PROGRESS NOTES
Susan had an appointment scheduled with hematology on 5/26. She has already seen her hematologist at [facility] so requested that her appointment on 5/26 be cancelled, via [employer] message. Appointment cancelled in [facility] 4/21.

## 2023-04-24 DIAGNOSIS — D64.9 LOW HEMOGLOBIN: Primary | ICD-10-CM

## 2023-04-24 DIAGNOSIS — Z94.4 LIVER REPLACED BY TRANSPLANT (H): ICD-10-CM

## 2023-04-24 NOTE — PROGRESS NOTES
Susan sent [employer] message that her hematologist requested that she see a hematologist through [employer] that is familiar with transplant patients. Referral reordered. Number provided to patient to schedule.     In addition, hematologist from [facility] requesting to speak to Dr. Cook regarding concerns they have. Message sent to Dr. Cook to reach out to Dr. Barahona.

## 2023-04-26 NOTE — PLAN OF CARE
Patient is alert, oriented x 4, denies pain, no nausea, Bristol score was # 0. Pt on Lactulose protocol. Intermittent nausea with relief after intervention, no emesis during the shift. Good PO intake, pt ate 80% of her dinner meal, voiding and ambulating without difficulty, reports positive flatus and  +BM. Pt on Lactulose protocol.    : Yes

## 2023-05-08 ENCOUNTER — LAB (OUTPATIENT)
Dept: LAB | Facility: CLINIC | Age: 53
End: 2023-05-08
Payer: COMMERCIAL

## 2023-05-08 DIAGNOSIS — Z94.4 LIVER REPLACED BY TRANSPLANT (H): ICD-10-CM

## 2023-05-08 DIAGNOSIS — Z94.4 LIVER REPLACED BY TRANSPLANT (H): Primary | ICD-10-CM

## 2023-05-08 LAB
ALBUMIN SERPL-MCNC: 3 G/DL (ref 3.4–5)
ALP SERPL-CCNC: 99 U/L (ref 40–150)
ALT SERPL W P-5'-P-CCNC: 30 U/L (ref 0–50)
ANION GAP SERPL CALCULATED.3IONS-SCNC: 5 MMOL/L (ref 3–14)
AST SERPL W P-5'-P-CCNC: 36 U/L (ref 0–45)
BASOPHILS # BLD AUTO: 0 10E3/UL (ref 0–0.2)
BASOPHILS NFR BLD AUTO: 1 %
BILIRUB DIRECT SERPL-MCNC: 0.1 MG/DL (ref 0–0.2)
BILIRUB SERPL-MCNC: 0.3 MG/DL (ref 0.2–1.3)
BUN SERPL-MCNC: 14 MG/DL (ref 7–30)
CALCIUM SERPL-MCNC: 7.5 MG/DL (ref 8.5–10.1)
CHLORIDE BLD-SCNC: 112 MMOL/L (ref 94–109)
CO2 SERPL-SCNC: 28 MMOL/L (ref 20–32)
CREAT SERPL-MCNC: 0.94 MG/DL (ref 0.52–1.04)
EOSINOPHIL # BLD AUTO: 0.1 10E3/UL (ref 0–0.7)
EOSINOPHIL NFR BLD AUTO: 5 %
ERYTHROCYTE [DISTWIDTH] IN BLOOD BY AUTOMATED COUNT: 13.2 % (ref 10–15)
GFR SERPL CREATININE-BSD FRML MDRD: 74 ML/MIN/1.73M2
GLUCOSE BLD-MCNC: 163 MG/DL (ref 70–99)
HCT VFR BLD AUTO: 30.6 % (ref 35–47)
HGB BLD-MCNC: 9.4 G/DL (ref 11.7–15.7)
IMM GRANULOCYTES # BLD: 0 10E3/UL
IMM GRANULOCYTES NFR BLD: 0 %
LYMPHOCYTES # BLD AUTO: 0.7 10E3/UL (ref 0.8–5.3)
LYMPHOCYTES NFR BLD AUTO: 30 %
MAGNESIUM SERPL-MCNC: 1.8 MG/DL (ref 1.6–2.3)
MCH RBC QN AUTO: 32.1 PG (ref 26.5–33)
MCHC RBC AUTO-ENTMCNC: 30.7 G/DL (ref 31.5–36.5)
MCV RBC AUTO: 104 FL (ref 78–100)
MONOCYTES # BLD AUTO: 0.2 10E3/UL (ref 0–1.3)
MONOCYTES NFR BLD AUTO: 8 %
NEUTROPHILS # BLD AUTO: 1.3 10E3/UL (ref 1.6–8.3)
NEUTROPHILS NFR BLD AUTO: 56 %
PHOSPHATE SERPL-MCNC: 4.3 MG/DL (ref 2.5–4.5)
PLATELET # BLD AUTO: 133 10E3/UL (ref 150–450)
POTASSIUM BLD-SCNC: 3.7 MMOL/L (ref 3.4–5.3)
PROT SERPL-MCNC: 6.1 G/DL (ref 6.8–8.8)
RBC # BLD AUTO: 2.93 10E6/UL (ref 3.8–5.2)
SODIUM SERPL-SCNC: 145 MMOL/L (ref 133–144)
TACROLIMUS BLD-MCNC: 7 UG/L (ref 5–15)
TME LAST DOSE: NORMAL H
TME LAST DOSE: NORMAL H
WBC # BLD AUTO: 2.3 10E3/UL (ref 4–11)
WBC # BLD AUTO: 2.3 10E3/UL (ref 4–11)

## 2023-05-08 PROCEDURE — 36415 COLL VENOUS BLD VENIPUNCTURE: CPT

## 2023-05-08 PROCEDURE — 85025 COMPLETE CBC W/AUTO DIFF WBC: CPT

## 2023-05-08 PROCEDURE — 80053 COMPREHEN METABOLIC PANEL: CPT

## 2023-05-08 PROCEDURE — 83735 ASSAY OF MAGNESIUM: CPT

## 2023-05-08 PROCEDURE — 82248 BILIRUBIN DIRECT: CPT

## 2023-05-08 PROCEDURE — 84100 ASSAY OF PHOSPHORUS: CPT

## 2023-05-08 PROCEDURE — 80197 ASSAY OF TACROLIMUS: CPT

## 2023-05-10 ENCOUNTER — VIRTUAL VISIT (OUTPATIENT)
Dept: PHARMACY | Facility: CLINIC | Age: 53
End: 2023-05-10
Payer: COMMERCIAL

## 2023-05-10 DIAGNOSIS — Z94.4 LIVER REPLACED BY TRANSPLANT (H): Primary | ICD-10-CM

## 2023-05-10 PROCEDURE — 99207 PR NO CHARGE LOS: CPT | Performed by: PHARMACIST

## 2023-05-10 RX ORDER — GABAPENTIN 100 MG/1
200 CAPSULE ORAL 3 TIMES DAILY
Status: ON HOLD | COMMUNITY
End: 2024-03-11

## 2023-05-10 RX ORDER — GLIPIZIDE 5 MG/1
5 TABLET, FILM COATED, EXTENDED RELEASE ORAL DAILY
COMMUNITY
End: 2023-09-08

## 2023-05-10 RX ORDER — HYDROXYZINE HYDROCHLORIDE 25 MG/1
25 TABLET, FILM COATED ORAL 3 TIMES DAILY PRN
COMMUNITY
End: 2023-12-18

## 2023-05-10 NOTE — PROGRESS NOTES
Disease State Management Encounter:                          Susan Puckett is a 52 year old female called for a follow-up visit.  Today's visit is a follow-up visit from 2/7     Reason for visit: 6 months post transplant. Asking about diabetes therapy.    Allergies/ADRs: Reviewed in chart  Past Medical History: Reviewed in chart  Tobacco: She reports that she has never smoked. She has never used smokeless tobacco.  Alcohol: not currently using  THC/CBD: none     Medication Adherence/Access: has not been taking creon. No issues with other medications.      Liver Transplant:  Current immunosuppressants include Tacrolimus 3mg every morning and 4mg every evening.  Pt reports loose stools, mild tremors.  Transplant date: 11/2/22  CMV prophylaxis: Stopped   PJP prophylaxis: off dapsone, caused hemolytic anemia.   Vascular prophylaxis: Aspirin 81mg daily. Denies gastrointestinal bleed.   PPI use: stopped.   Supplements: Magnesium Glucinate 300mg 2 TIMES DAILY, Calcium carbonate/D twice daily , B12 1000 mcg weekly, Folic acid 1mg daily, MVI daily  Vascular prophylaxis: aspirin 81mg daily, denies gastrointestinal bleed sx.   Hyperkalemia: Florinef 0.1mg daily  Tx Coordinator: Stephie Lilly MD: Dr. Vizcaino, Using Med Card: Yes  Immunizations: annual flu shot 2022; [id number]:  2019; Prevnar 13/15/20: unknown; TDaP:  10/2012; Shingrix: unknown, HBV: no immunity, COVID: Pfizer-BioNTech x3, Bivalent x1  Lab Results   Component Value Date    POTASSIUM 3.7 05/08/2023    POTASSIUM 3.8 04/17/2023    POTASSIUM 5.8 (H) 04/11/2023    POTASSIUM 6.0 (H) 04/10/2023    POTASSIUM 4.6 03/27/2023    MAG 1.8 05/08/2023    MAG 1.9 04/11/2023    MAG 1.9 04/10/2023     Today's Vitals: There were no vitals taken for this visit.    Assessment/Plan:  Discussed Glipizide with patient, it may be cheap but may decrease pancreas function over time. Discussed other medications recommended by guidelines.     1. Long term treatment for diabetes I  would recommend Metformin first line. Beyond that I would recommend SGLT-2 inhibitor and GLP1 agonist. Follow-up with endocrinology to discuss.   2. Vaccines due now: Prevnar 20 or Vaxneuvance 15, Tetanus, 2nd dose of Bivalent COVID, Shingrix (2 doses weeks apart), after these Hepatitis B series.   Recommended Hep B schedule:  -Engerix-B 20 mcg/mL: Administer 2 mL per dose at 0, 1, 2, and 6 months  OR  -Recombivax HB 40 mcg/mL: Administer 1 mL per dose at 0, 1, and 6 months  3. Reduce Magnesium to 200mg twice daily.     TXP team...  1. Consider stopping Florinef, last month of potassium's in the 3's.     Follow-up: as needed    I spent 30 minutes with this patient today. All changes were made via collaborative practice agreement with Dr. Cook. A copy of the visit note was provided to the patient's provider(s).    A summary of these recommendations was given to the patient.    Davi Clayton, PharmD  [facility] Pharmacist    Phone: [phone]      Medication Therapy Recommendations  Liver replaced by transplant (H)    Current Medication:  magnesium glycinate lysinate chelate (DOCTOR'S BEST) 100 MG TABS tablet (Discontinued)   Rationale: Dose too high - Dosage too high - Safety   Recommendation: Decrease Dose   Status: Accepted per [facility]          Current Medication: fludrocortisone (FLORINEF) 0.1 MG tablet   Rationale: No medical indication at this time - Unnecessary medication therapy - Indication   Recommendation: Discontinue Medication   Status: Contact Provider - Awaiting Response          Rationale: Preventive therapy - Needs additional medication therapy - Indication   Recommendation: Order Vaccine - PREVNAR 20 IM   Status: Patient Agreed - Adherence/Education         Type 2 diabetes mellitus without complication, without long-term current use of insulin (H)    Current Medication: glipiZIDE (GLUCOTROL XL) 5 MG 24 hr tablet   Rationale: More effective medication available - Ineffective medication - Effectiveness    Recommendation: Change Medication - metFORMIN 500 MG 24 hr tablet   Status: Patient Agreed - Adherence/Education   Note: Pt to discuss with endocrinology

## 2023-05-10 NOTE — PATIENT INSTRUCTIONS
"Recommendations from today's MTM visit:                                                       1. Long term treatment for diabetes I would recommend Metformin first line. Beyond that I would recommend SGLT-2 inhibitor and GLP1 agonist. Follow-up with endocrinology to discuss. Glipizide is cheap, but may decrease kidney function over time.  2. Vaccines due now: Prevnar 20 or Vaxneuvance 15, Tetanus, 2nd dose of Bivalent COVID, Shingrix (2 doses weeks apart), after these Hepatitis B series.   Recommended Hep B schedule:  -Engerix-B 20 mcg/mL: Administer 2 mL per dose at 0, 1, 2, and 6 months  OR  -Recombivax HB 40 mcg/mL: Administer 1 mL per dose at 0, 1, and 6 months  3. Reduce Magnesium to 200mg twice daily.     Follow-up: as needed    It was great speaking with you today.  I value your experience and would be very thankful for your time in providing feedback in our clinic survey. In the next few days, you may receive an email or text message from [employer] with a link to a survey related to your  clinical pharmacist.\"     To schedule another MTM appointment, please call the clinic directly or you may call the MTM scheduling line at [phone] or toll-free at [phone].     My Clinical Pharmacist's contact information:                                                      Please feel free to contact me with any questions or concerns you have.      Davi Clayton, PharmD  [facility] Pharmacist    Phone: [phone]     "

## 2023-05-16 ENCOUNTER — TELEPHONE (OUTPATIENT)
Dept: TRANSPLANT | Facility: CLINIC | Age: 53
End: 2023-05-16
Payer: COMMERCIAL

## 2023-05-16 NOTE — TELEPHONE ENCOUNTER
Susan called regarding a recommendation she got from her physical therapist. Her PT recommended strengthening her core with a machine that is offered at [facility], but not her own PT center. Her PT will send the order to [facility]. Recommended following her PT's advice, but to not push herself too hard. She is eager to get back to her pre-transplant strength baseline. She will follow up with her PT.   Susan also informed me that Dr. Cook and her hematologist from [facility] had collaborated and discontinued her dapsone. She was curious whether Dr. Cook wanted her on something else for prophylaxis but she would have been taken off prophylaxis soon anyways. She stated she will follow this up with Dr. Cook at her appointment next Tuesday.

## 2023-05-22 NOTE — PROGRESS NOTES
[facility] Hematology Consultation    Outpatient Visit Note:    Patient: Susan Puckett  MRN: [id number]  : 1970  RHONDA: May 22, 2023    Reason for Consultation: Anemia.    Assessment:  Susan Puckett is a 52 year old woman with a history of liver transplant on tacrolimus and gastric bypass surgery referred for evaluation of anemia.    Her anemia is somewhat improved today and her hemolysis parameters are improved as well.  I think that her anemia was partially driven by hemolysis related to dapsone, and stopping this has resulted in improvement.  It also appears that she is iron deficient, and this may be contributing to her anemia as well, as well as her symptoms of fatigue.  I think she should receive IV iron, as I am not confident that oral iron will work given her history of gastric bypass surgery.    She also has had mild neutropenia for the last few months.  This is improved today as well.  I am not certain of the explanation for this, perhaps this is a side effect of tacrolimus.  This should be monitored, but I do not think any further investigation is needed at this time.    Finally, I note hyperkalemia on labs today, as well as intermittently over the last several months.  The reason for this is not immediately clear.  Her renal function is normal.  She does have hyperglycemia, which could be a contributor.  Perhaps there was hemolysis of her blood sample, as her LDH was elevated also.  I will reach out to her to see who manages her diabetes, as perhaps she needs tighter control.    I will need to clarify with her whether she wants to continue to follow here and receive IV iron here, or if she wants to follow-up with her other hematologist.    Plan:  -Clarify follow-up plan  -Should receive IV iron  -Monitor white count/ANC and hemoglobin  -Monitor potassium, consider adjustment of diabetes therapy    The patient is given our center's contact information and is instructed to call if she  should have any further questions or concerns.    Keke Dang, nurse clinician, is assigned to provide patient care coordination and education.     Jacob Cogan, MD  Hematology    60 minutes spent by me on the date of the encounter doing chart review, history and exam, documentation and further activities per the note  ----------------------------------------------------------------------------------------------------------------------    History of Present Illness:  Susan Puckett is a 52 year old woman with a history of CUETO s/p liver transplant (November 2022, on tacrolimus), gastric bypass surgery (2006), referred for evaluation of hemolytic anemia.    Most recent CBC in early May with a white count of 2300/mcL (ANC 1300/mcL), hemoglobin 9.4 g/dL with an MCV of 104, and a platelet count of 133,000/mcL.  Adequate reticulocytosis for degree of anemia.  Normal bilirubin levels.  B12 folate and iron levels normal.  Haptoglobin less than 3 mg/dL.  LDH elevated at 306 units/L.  PIERCE negative.  Peripheral smear with moderate macrocytic anemia with increased erythrocyte regeneration and rare bite cells, as well as moderate leukocytopenia with lymphocytopenia.  There was a suggestion of Humza body hemolysis in the context of dapsone therapy.    Dapsone was stopped beginning of May.  She presents today at the recommendation of her outside hematologist.  She notes fatigue.  No fevers or night sweats, no unexpected weight loss, no rashes or adenopathy.  No bleeding or bruising issues.  Medications as listed below.    Past Medical History:  Past Medical History:   Diagnosis Date     Anemia      Anxiety      Cold sore      Depressive disorder      Diabetes (H)     Type 2 DM/No Insulin      Encephalopathy      Esophageal varices without bleeding (H)     grade I     History of blood transfusion     10/2019     History of seizure     diabetic seizure     Insomnia      Liver cirrhosis secondary to CUETO (H) 05/28/2020      Migraine      Pleural effusion      Thrombocytopenia (H)      Thyroid disease        Past Surgical History:  Past Surgical History:   Procedure Laterality Date     APPENDECTOMY           BENCH LIVER N/A 2022    Procedure: Bench liver;  Surgeon: Delbert Morgan MD;  Location: [facility] OR     COLONOSCOPY      2020 at [facility]      ENT SURGERY  1998    tempanoplasty     GI SURGERY      EGD 2020 at [facility]      GYN SURGERY      laparoscopic ablation     IR THORACENTESIS  2022     IR THORACENTESIS  2022     IR THORACENTESIS  2021     IR THORACENTESIS  2021     IR THORACENTESIS  2021     IR THORACENTESIS  2021     IR THORACENTESIS  2021     IR THORACENTESIS  2021     IR THORACENTESIS  10/29/2021     IR THORACENTESIS  10/28/2021     IR THORACENTESIS  10/27/2021     IR THORACENTESIS  10/24/2021     IR THORACENTESIS  10/22/2021     IR THORACENTESIS  10/5/2021     IR THORACENTESIS  2021     IR THORACENTESIS  2021     IR THORACENTESIS  2021     IR THORACENTESIS  2021     IR THORACENTESIS  2021     IR THORACENTESIS  2021     IR THORACENTESIS  8/10/2021     IR THORACENTESIS  2021     IR THORACENTESIS  2021     IR THORACENTESIS  2021     IR THORACENTESIS  2021     IR THORACENTESIS  2021     IR THORACENTESIS  3/31/2021     IR THORACENTESIS  2020     IR TRANSVEN INTRAHEPATIC PORTOSYST SHUNT  2021     MAMMOPLASTY REDUCTION BILATERAL       SD STAB PHELBECTOMY VARICOSE VEINS, LESS THAN 10 INCISIONS, ONE EXTREMITY       RNY gastric bypass  2006    retoocolic, retrogastric, [facility]     TONSILLECTOMY & ADENOIDECTOMY Bilateral      TRANSPLANT LIVER RECIPIENT  DONOR N/A 2022    Procedure: TRANSPLANT, LIVER, RECIPIENT,  DONOR;  Surgeon: Delbert Morgan MD;  Location: [facility] OR     WISDOM TEETH EXTRACTION         Medications:  Current Outpatient Medications   Medication Sig  Dispense Refill      magnesium glycinate lysinate chelate (DOCTOR'S BEST) 100 MG TABS tablet Take 2 tablets (200 mg) by mouth 2 times daily 540 tablet 3     acetaminophen (TYLENOL) 325 MG tablet Take 3 tablets (975 mg) by mouth every 8 hours as needed for mild pain 100 tablet 0     acyclovir (ZOVIRAX) 400 MG tablet TAKE 1 TABLET BY MOUTH THREE TIMES A DAY FOR 5 DAYS.       aspirin (ASA) 81 MG chewable tablet Take 1 tablet (81 mg) by mouth daily 90 tablet 3     calcium carbonate-vitamin D (OSCAL) 500-5 MG-MCG tablet Take 1 tablet by mouth 2 times daily       cyanocobalamin (VITAMIN B-12) 1000 MCG tablet Take 1,000 mcg by mouth every 7 days on Wednesdays       diazepam (VALIUM) 5 MG tablet TAKE 1 TABLET BY MOUTH ONCE FOR 1 DOSE. *BRING TO MRI APPT AND WAIT FOR FURTHER GUIDANCE*       docusate sodium (DSS) 100 MG capsule Take 100 mg by mouth       escitalopram (LEXAPRO) 20 MG tablet Take 30 mg by mouth daily       fludrocortisone (FLORINEF) 0.1 MG tablet Take 1 tablet (0.1 mg) by mouth daily 90 tablet 3     folic acid (FOLVITE) 1 MG tablet Take 1 mg by mouth every morning       gabapentin (NEURONTIN) 100 MG capsule Take 300 mg by mouth 3 times daily       glipiZIDE (GLUCOTROL XL) 5 MG 24 hr tablet Take 5 mg by mouth daily       hydrOXYzine (ATARAX) 25 MG tablet Take 25 mg by mouth 3 times daily as needed for anxiety       levothyroxine (SYNTHROID/LEVOTHROID) 125 MCG tablet Take 125 mcg by mouth daily       LORazepam (ATIVAN) 0.5 MG tablet Take 0.5 mg by mouth nightly as needed For anxiety       melatonin 5 MG tablet Take 5 mg by mouth At Bedtime       ondansetron (ZOFRAN ODT) 8 MG ODT tab Take 1 tablet (8 mg) by mouth every 8 hours as needed for nausea 30 tablet 0     SUMAtriptan (IMITREX) 20 MG/ACT nasal spray Spray 1 spray in nostril as needed for migraine       tacrolimus (GENERIC EQUIVALENT) 0.5 MG capsule Take 1 capsule (0.5 mg) by mouth as needed (For dose changes) Total dose 5 mg BID 90 capsule 3     tacrolimus  (GENERIC EQUIVALENT) 1 MG capsule Take 3 capsules (3 mg) by mouth every morning AND 4 capsules (4 mg) every evening. Total dose 3 mg am, 4 mg pm 630 capsule 11     tacrolimus (GENERIC EQUIVALENT) 5 MG capsule Take 1 capsule (5 mg) by mouth every evening Total dose 4mg am, 5mg pm 90 capsule 3     topiramate (TOPAMAX) 25 MG tablet Take 100 mg by mouth At Bedtime       traZODone (DESYREL) 50 MG tablet Take 25 mg by mouth At Bedtime       amylase-lipase-protease (CREON 24) 94832-26102 units CPEP per EC capsule Take 2 capsules by mouth 3 times daily (with meals) (Patient not taking: Reported on 5/10/2023) 180 capsule 3     amylase-lipase-protease (CREON 24) 60025-59235 units CPEP per EC capsule Take 1 capsule by mouth Take with snacks or supplements (with snacks) (Patient not taking: Reported on 5/10/2023) 90 capsule 3     Continuous Blood Gluc  (DEXCOM G6 ) LUNA Use as directed for continuous glucose monitoring. (Patient not taking: Reported on 5/26/2023)       Continuous Blood Gluc Sensor (DEXCOM G6 SENSOR) MISC Use as directed for continuous glucose monitoring.  Change sensor every 10 days. (Patient not taking: Reported on 5/26/2023)       Continuous Blood Gluc Transmit (DEXCOM G6 TRANSMITTER) MISC Use as directed for continuous glucose monitoring.  Change every 3 months. (Patient not taking: Reported on 5/26/2023)          Allergies:  Allergies   Allergen Reactions     Methylprednisolone Hives     Per patient, reaction occurred in 1999 or earlier. Broke out in round, flat hives in neck and chest area. No shortness of breath or swelling. Unknown if reaction occurred immediately after administration.      Adhesive Tape Itching     Oxycodone      slurring     Droperidol Anxiety     Nsaids Other (See Comments)     GI bleed.     Prednisone Anxiety, Hives and Rash     Per patient she has tolerated this since       ROS:  A 14 point ROS is negative except as stated in the HPI    Social History:  Denies any  "tobacco use. No significant alcohol use. Denies any illicit drug use.     Family History:  Non-contributory to the current presentation.    Objective:  BP (!) 142/87   Pulse 64   Temp 98  F (36.7  C) (Oral)   Resp 12   Ht 1.685 m (5' 6.34\")   Wt 70.1 kg (154 lb 9.6 oz)   SpO2 99%   BMI 24.70 kg/m    Exam:   Constitutional: Appears well, no distress  HEENT: Pupils equal and reactive to light. No scleral icterus or hemorrhage. Nares without evidence of telangiectasia. Mucous membranes moist with no wet purpura. Dentition overall ok with no signs of decay. No pharyngeal exudates. No lymphadenopathy, no thyromeagaly  CV: regular rate and rhythm, no murmurs  Respiratory: clear  GI: abdomen soft, nontender, without guarding or rebound. No hepatomeagaly. No splenomegaly. Rectal exam deferred.   Mus/Skele: no edema  Skin: no petechiae, no ecchymosis.  Neuro: CN II-XII intact. Normal gait. AOx3  Heme/Lymph: no supraclavicular, axillary or umbilical adenopathy.     Labs:  WBC   Date Value Ref Range Status   07/05/2021 4.0 4.0 - 11.0 10e9/L Final   05/18/2021 3.3 (L) 4.0 - 11.0 10e9/L Final   03/19/2021 4.6 4.0 - 11.0 10e9/L Final   02/25/2021 4.9 4.0 - 11.0 10e9/L Final     WBC Count   Date Value Ref Range Status   05/08/2023 2.3 (L) 4.0 - 11.0 10e3/uL Final   05/08/2023 2.3 (L) 4.0 - 11.0 10e3/uL Final   04/11/2023 2.9 (L) 4.0 - 11.0 10e3/uL Final   04/10/2023 2.7 (L) 4.0 - 11.0 10e3/uL Final     Hemoglobin   Date Value Ref Range Status   05/08/2023 9.4 (L) 11.7 - 15.7 g/dL Final   04/11/2023 9.0 (L) 11.7 - 15.7 g/dL Final   04/10/2023 8.6 (L) 11.7 - 15.7 g/dL Final   03/27/2023 8.4 (L) 11.7 - 15.7 g/dL Final   07/05/2021 11.5 (L) 11.7 - 15.7 g/dL Final   05/18/2021 11.3 (L) 11.7 - 15.7 g/dL Final   03/19/2021 11.4 (L) 11.7 - 15.7 g/dL Final   02/25/2021 10.1 (L) 11.7 - 15.7 g/dL Final     Hematocrit   Date Value Ref Range Status   05/08/2023 30.6 (L) 35.0 - 47.0 % Final   04/11/2023 29.8 (L) 35.0 - 47.0 % Final "   04/10/2023 28.2 (L) 35.0 - 47.0 % Final   03/27/2023 27.4 (L) 35.0 - 47.0 % Final   07/05/2021 34.4 (L) 35.0 - 47.0 % Final   05/18/2021 35.0 35.0 - 47.0 % Final   03/19/2021 35.2 35.0 - 47.0 % Final   02/25/2021 29.6 (L) 35.0 - 47.0 % Final     Platelet Count   Date Value Ref Range Status   05/08/2023 133 (L) 150 - 450 10e3/uL Final   04/11/2023 140 (L) 150 - 450 10e3/uL Final   04/10/2023 128 (L) 150 - 450 10e3/uL Final   03/27/2023 120 (L) 150 - 450 10e3/uL Final   07/05/2021 104 (L) 150 - 450 10e9/L Final   05/18/2021 103 (L) 150 - 450 10e9/L Final   03/19/2021 104 (L) 150 - 450 10e9/L Final   02/25/2021 77 (L) 150 - 450 10e9/L Final       Imaging:  XR Abdomen 1 View  Narrative: Exam: XR ABDOMEN 1 VIEW, 1/19/2023 4:33 PM    Indication: Assess stool burden    Comparison: CT: 1/18/2023    Findings:   Frontal radiograph of the abdomen. Clips in the right upper quadrant.  Nonobstructive bowel gas pattern. Mild colonic stool burden. No  pneumatosis or portal venous gas. Loop recorder over the right  abdomen. No aggressive osseous lesions. Lung bases are grossly  unremarkable.  Impression: Impression:   Nonobstructive bowel gas pattern. Mild colonic stool burden.    I have personally reviewed the examination and initial interpretation  and I agree with the findings.    CHRISTOS DONG MD         SYSTEM ID:  [id number]

## 2023-05-23 ENCOUNTER — OFFICE VISIT (OUTPATIENT)
Dept: GASTROENTEROLOGY | Facility: CLINIC | Age: 53
End: 2023-05-23
Attending: INTERNAL MEDICINE
Payer: COMMERCIAL

## 2023-05-23 VITALS
HEIGHT: 67 IN | HEART RATE: 65 BPM | SYSTOLIC BLOOD PRESSURE: 170 MMHG | WEIGHT: 159.5 LBS | DIASTOLIC BLOOD PRESSURE: 99 MMHG | BODY MASS INDEX: 25.03 KG/M2

## 2023-05-23 DIAGNOSIS — Z94.4 LIVER REPLACED BY TRANSPLANT (H): Primary | ICD-10-CM

## 2023-05-23 PROCEDURE — G0463 HOSPITAL OUTPT CLINIC VISIT: HCPCS | Performed by: INTERNAL MEDICINE

## 2023-05-23 PROCEDURE — 99214 OFFICE O/P EST MOD 30 MIN: CPT | Performed by: INTERNAL MEDICINE

## 2023-05-23 ASSESSMENT — PAIN SCALES - GENERAL: PAINLEVEL: NO PAIN (0)

## 2023-05-23 NOTE — LETTER
5/23/2023         RE: Susan Puckett  [address] [id number]        Dear Colleague,    Thank you for referring your patient, Susan Puckett, to the [facility] [place]. Please see a copy of my visit note below.    HISTORY OF PRESENT ILLNESS:  I had the pleasure of seeing Susan Puckett for followup in the Liver Transplant Clinic at the [facility] on 05/23/2023.  Ms. 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    Overall, she is doing fairly well.  She denies any abdominal pain, itching or skin rash.  She does have fatigue.  She has had an ongoing hemolytic anemia of unclear etiology, and I will discuss it more in the discussion part of this dictation.  She denies any increased abdominal girth.  She has very mild lower extremity edema.    She denies any fevers or chills.  She denies any cough, but does have shortness of breath, particularly dyspnea on exertion.  She denies any nausea or vomiting.  She does have occasional increased loose stools in the morning.  Her appetite has been good, and she is beginning to gain some weight.    Otherwise, there have been no other new events since she was last seen.    Current Outpatient Medications   Medication     magnesium glycinate lysinate chelate (DOCTOR'S BEST) 100 MG TABS tablet    acetaminophen (TYLENOL) 325 MG tablet    aspirin (ASA) 81 MG chewable tablet    calcium carbonate-vitamin D (OSCAL) 500-5 MG-MCG tablet    Continuous Blood Gluc  (DEXCOM G6 ) LUNA    Continuous Blood Gluc Sensor (DEXCOM G6 SENSOR) MISC    Continuous Blood Gluc Transmit (DEXCOM G6 TRANSMITTER) MISC    cyanocobalamin (VITAMIN B-12) 1000 MCG tablet    escitalopram (LEXAPRO) 20 MG tablet    fludrocortisone (FLORINEF) 0.1 MG tablet    folic acid (FOLVITE) 1 MG tablet    gabapentin (NEURONTIN) 100 MG capsule    glipiZIDE  "(GLUCOTROL XL) 5 MG 24 hr tablet    hydrOXYzine (ATARAX) 25 MG tablet    levothyroxine (SYNTHROID/LEVOTHROID) 125 MCG tablet    LORazepam (ATIVAN) 0.5 MG tablet    melatonin 5 MG tablet    ondansetron (ZOFRAN ODT) 8 MG ODT tab    SUMAtriptan (IMITREX) 20 MG/ACT nasal spray    tacrolimus (GENERIC EQUIVALENT) 0.5 MG capsule    tacrolimus (GENERIC EQUIVALENT) 1 MG capsule    tacrolimus (GENERIC EQUIVALENT) 5 MG capsule    topiramate (TOPAMAX) 25 MG tablet    traZODone (DESYREL) 50 MG tablet    amylase-lipase-protease (CREON 24) 95564-44142 units CPEP per EC capsule    amylase-lipase-protease (CREON 24) 78416-60188 units CPEP per EC capsule     No current facility-administered medications for this visit.     BP (!) 170/99   Pulse 65   Ht 1.689 m (5' 6.5\")   Wt 72.3 kg (159 lb 8 oz)   BMI 25.36 kg/m      PHYSICAL EXAMINATION:    GENERAL:  She actually looks quite well.  HEENT:  No scleral icterus or temporal muscle wasting.  CHEST:  Clear.  ABDOMEN:  No increase in girth.  No masses or tenderness to palpation is present.  Her incision is intact.  Her liver is 10 cm in span without left lobe enlargement.  No spleen tip is palpable.  EXTREMITIES:  No edema.  SKIN:  No stigmata of chronic liver disease.  NEUROLOGIC:  No asterixis.    LABORATORY:  Most recent laboratory tests are from 05/08/2023 and showed her white count was 2.3, hemoglobin is 9.4 and platelets 133,000.  Her MCV was 104.  Her sodium is 145, potassium 3.7, BUN is 14, creatinine 0.9.  Her AST is 36, ALT is 30, alkaline phosphatase 99.  Her albumin is 3.0 with a total protein of 6.1 and total bilirubin is 0.3.    IMPRESSION:  My impression is that Ms. Puckett is doing fairly well status post liver transplantation.  She is blood group A and did get a blood group O liver and we do not uncommonly see hemolysis occurring in that scenario related donor lymphocytes producing anti-A antibodies.  She only manifests about 2 weeks posttransplant and it is often gone " by 1 month posttransplant.  I do not think I have seen it gone on for this period of time.  Thus, she has been seeing a hematologist who recommended she see one of our hematologists here to do a more extensive evaluation to try and understand why she has ongoing hemolysis.  Even though haptoglobin is made by the liver, her liver tests are completely normal and her low haptoglobin is not related to any sort of liver disease.    She has seen the dermatologist already and is otherwise up to date on vaccines as well.  My plan will be to see her back in the clinic in 3 months.  I will discontinue the Florinef as she does not appear to need that any more.  We will also cut back on her magnesium to 2 per day.    I did spend a total of 30 minutes (on the date of the encounter), including 20 minutes of face-to-face clinic time including counseling. The rest of the time was spent in documentation and review of records.     Thank you very much for allowing me to participate in the care of this patient.  If you have any questions regarding recommendations, please do not hesitate to contact me.         Raphael Cook MD      Professor of Medicine  [employer]      Executive Medical Director, Solid Organ Transplant Program  [facility]

## 2023-05-23 NOTE — PROGRESS NOTES
HISTORY OF PRESENT ILLNESS:  I had the pleasure of seeing Susan Puckett for followup in the Liver Transplant Clinic at the [facility] on 05/23/2023.  Ms. 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    Overall, she is doing fairly well.  She denies any abdominal pain, itching or skin rash.  She does have fatigue.  She has had an ongoing hemolytic anemia of unclear etiology, and I will discuss it more in the discussion part of this dictation.  She denies any increased abdominal girth.  She has very mild lower extremity edema.    She denies any fevers or chills.  She denies any cough, but does have shortness of breath, particularly dyspnea on exertion.  She denies any nausea or vomiting.  She does have occasional increased loose stools in the morning.  Her appetite has been good, and she is beginning to gain some weight.    Otherwise, there have been no other new events since she was last seen.    Current Outpatient Medications   Medication      magnesium glycinate lysinate chelate (DOCTOR'S BEST) 100 MG TABS tablet     acetaminophen (TYLENOL) 325 MG tablet     aspirin (ASA) 81 MG chewable tablet     calcium carbonate-vitamin D (OSCAL) 500-5 MG-MCG tablet     Continuous Blood Gluc  (DEXCOM G6 ) LUNA     Continuous Blood Gluc Sensor (DEXCOM G6 SENSOR) MISC     Continuous Blood Gluc Transmit (DEXCOM G6 TRANSMITTER) MISC     cyanocobalamin (VITAMIN B-12) 1000 MCG tablet     escitalopram (LEXAPRO) 20 MG tablet     fludrocortisone (FLORINEF) 0.1 MG tablet     folic acid (FOLVITE) 1 MG tablet     gabapentin (NEURONTIN) 100 MG capsule     glipiZIDE (GLUCOTROL XL) 5 MG 24 hr tablet     hydrOXYzine (ATARAX) 25 MG tablet     levothyroxine (SYNTHROID/LEVOTHROID) 125 MCG tablet     LORazepam (ATIVAN) 0.5 MG tablet     melatonin 5 MG tablet     ondansetron (ZOFRAN ODT) 8 MG ODT tab     SUMAtriptan  "(IMITREX) 20 MG/ACT nasal spray     tacrolimus (GENERIC EQUIVALENT) 0.5 MG capsule     tacrolimus (GENERIC EQUIVALENT) 1 MG capsule     tacrolimus (GENERIC EQUIVALENT) 5 MG capsule     topiramate (TOPAMAX) 25 MG tablet     traZODone (DESYREL) 50 MG tablet     amylase-lipase-protease (CREON 24) 35798-57188 units CPEP per EC capsule     amylase-lipase-protease (CREON 24) 61691-60908 units CPEP per EC capsule     No current facility-administered medications for this visit.     BP (!) 170/99   Pulse 65   Ht 1.689 m (5' 6.5\")   Wt 72.3 kg (159 lb 8 oz)   BMI 25.36 kg/m      PHYSICAL EXAMINATION:    GENERAL:  She actually looks quite well.  HEENT:  No scleral icterus or temporal muscle wasting.  CHEST:  Clear.  ABDOMEN:  No increase in girth.  No masses or tenderness to palpation is present.  Her incision is intact.  Her liver is 10 cm in span without left lobe enlargement.  No spleen tip is palpable.  EXTREMITIES:  No edema.  SKIN:  No stigmata of chronic liver disease.  NEUROLOGIC:  No asterixis.    LABORATORY:  Most recent laboratory tests are from 05/08/2023 and showed her white count was 2.3, hemoglobin is 9.4 and platelets 133,000.  Her MCV was 104.  Her sodium is 145, potassium 3.7, BUN is 14, creatinine 0.9.  Her AST is 36, ALT is 30, alkaline phosphatase 99.  Her albumin is 3.0 with a total protein of 6.1 and total bilirubin is 0.3.    IMPRESSION:  My impression is that Ms. Puckett is doing fairly well status post liver transplantation.  She is blood group A and did get a blood group O liver and we do not uncommonly see hemolysis occurring in that scenario related donor lymphocytes producing anti-A antibodies.  She only manifests about 2 weeks posttransplant and it is often gone by 1 month posttransplant.  I do not think I have seen it gone on for this period of time.  Thus, she has been seeing a hematologist who recommended she see one of our hematologists here to do a more extensive evaluation to try and " understand why she has ongoing hemolysis.  Even though haptoglobin is made by the liver, her liver tests are completely normal and her low haptoglobin is not related to any sort of liver disease.    She has seen the dermatologist already and is otherwise up to date on vaccines as well.  My plan will be to see her back in the clinic in 3 months.  I will discontinue the Florinef as she does not appear to need that any more.  We will also cut back on her magnesium to 2 per day.    I did spend a total of 30 minutes (on the date of the encounter), including 20 minutes of face-to-face clinic time including counseling. The rest of the time was spent in documentation and review of records.     Thank you very much for allowing me to participate in the care of this patient.  If you have any questions regarding recommendations, please do not hesitate to contact me.         Raphael Cook MD      Professor of Medicine  [employer]      Executive Medical Director, Solid Organ Transplant Program  [facility]

## 2023-05-23 NOTE — NURSING NOTE
"Chief Complaint   Patient presents with     RECHECK     Follow up with liver transplant     BP (!) 170/99   Pulse 65   Ht 1.689 m (5' 6.5\")   Wt 72.3 kg (159 lb 8 oz)   BMI 25.36 kg/m    Anna Newsome CMA on 5/23/2023 at 9:17 AM    "

## 2023-05-26 ENCOUNTER — ONCOLOGY VISIT (OUTPATIENT)
Dept: ONCOLOGY | Facility: CLINIC | Age: 53
End: 2023-05-26
Attending: INTERNAL MEDICINE
Payer: COMMERCIAL

## 2023-05-26 ENCOUNTER — PRE VISIT (OUTPATIENT)
Dept: ONCOLOGY | Facility: CLINIC | Age: 53
End: 2023-05-26
Payer: COMMERCIAL

## 2023-05-26 ENCOUNTER — TELEPHONE (OUTPATIENT)
Dept: TRANSPLANT | Facility: CLINIC | Age: 53
End: 2023-05-26

## 2023-05-26 ENCOUNTER — PATIENT OUTREACH (OUTPATIENT)
Dept: ONCOLOGY | Facility: CLINIC | Age: 53
End: 2023-05-26

## 2023-05-26 VITALS
WEIGHT: 154.6 LBS | HEIGHT: 66 IN | SYSTOLIC BLOOD PRESSURE: 142 MMHG | OXYGEN SATURATION: 99 % | TEMPERATURE: 98 F | HEART RATE: 64 BPM | BODY MASS INDEX: 24.85 KG/M2 | RESPIRATION RATE: 12 BRPM | DIASTOLIC BLOOD PRESSURE: 87 MMHG

## 2023-05-26 DIAGNOSIS — Z94.4 LIVER REPLACED BY TRANSPLANT (H): ICD-10-CM

## 2023-05-26 DIAGNOSIS — D64.9 LOW HEMOGLOBIN: ICD-10-CM

## 2023-05-26 LAB
ALBUMIN SERPL BCG-MCNC: 3.6 G/DL (ref 3.5–5.2)
ALP SERPL-CCNC: 104 U/L (ref 35–104)
ALT SERPL W P-5'-P-CCNC: 12 U/L (ref 10–35)
ANION GAP SERPL CALCULATED.3IONS-SCNC: 6 MMOL/L (ref 7–15)
AST SERPL W P-5'-P-CCNC: 30 U/L (ref 10–35)
BASOPHILS # BLD AUTO: 0 10E3/UL (ref 0–0.2)
BASOPHILS NFR BLD AUTO: 1 %
BILIRUB SERPL-MCNC: 0.2 MG/DL
BUN SERPL-MCNC: 13.7 MG/DL (ref 6–20)
CALCIUM SERPL-MCNC: 8.2 MG/DL (ref 8.6–10)
CHLORIDE SERPL-SCNC: 109 MMOL/L (ref 98–107)
CREAT SERPL-MCNC: 0.98 MG/DL (ref 0.51–0.95)
DEPRECATED HCO3 PLAS-SCNC: 23 MMOL/L (ref 22–29)
EOSINOPHIL # BLD AUTO: 0.1 10E3/UL (ref 0–0.7)
EOSINOPHIL NFR BLD AUTO: 3 %
ERYTHROCYTE [DISTWIDTH] IN BLOOD BY AUTOMATED COUNT: 12.7 % (ref 10–15)
FERRITIN SERPL-MCNC: 38 NG/ML (ref 6–175)
GFR SERPL CREATININE-BSD FRML MDRD: 70 ML/MIN/1.73M2
GLUCOSE SERPL-MCNC: 205 MG/DL (ref 70–99)
HCT VFR BLD AUTO: 33.1 % (ref 35–47)
HGB BLD-MCNC: 10.3 G/DL (ref 11.7–15.7)
IMM GRANULOCYTES # BLD: 0 10E3/UL
IMM GRANULOCYTES NFR BLD: 0 %
IRON BINDING CAPACITY (ROCHE): 228 UG/DL (ref 240–430)
IRON SATN MFR SERPL: 16 % (ref 15–46)
IRON SERPL-MCNC: 36 UG/DL (ref 37–145)
LDH SERPL L TO P-CCNC: 286 U/L (ref 0–250)
LYMPHOCYTES # BLD AUTO: 0.8 10E3/UL (ref 0.8–5.3)
LYMPHOCYTES NFR BLD AUTO: 21 %
MCH RBC QN AUTO: 30 PG (ref 26.5–33)
MCHC RBC AUTO-ENTMCNC: 31.1 G/DL (ref 31.5–36.5)
MCV RBC AUTO: 97 FL (ref 78–100)
MONOCYTES # BLD AUTO: 0.2 10E3/UL (ref 0–1.3)
MONOCYTES NFR BLD AUTO: 5 %
NEUTROPHILS # BLD AUTO: 2.5 10E3/UL (ref 1.6–8.3)
NEUTROPHILS NFR BLD AUTO: 70 %
NRBC # BLD AUTO: 0 10E3/UL
NRBC BLD AUTO-RTO: 1 /100
PLATELET # BLD AUTO: 139 10E3/UL (ref 150–450)
POTASSIUM SERPL-SCNC: 5.8 MMOL/L (ref 3.4–5.3)
PROT SERPL-MCNC: 6.3 G/DL (ref 6.4–8.3)
RBC # BLD AUTO: 3.43 10E6/UL (ref 3.8–5.2)
RETICS # AUTO: 0.07 10E6/UL (ref 0.03–0.1)
RETICS/RBC NFR AUTO: 1.9 % (ref 0.5–2)
SODIUM SERPL-SCNC: 138 MMOL/L (ref 136–145)
WBC # BLD AUTO: 3.6 10E3/UL (ref 4–11)

## 2023-05-26 PROCEDURE — 83550 IRON BINDING TEST: CPT | Performed by: STUDENT IN AN ORGANIZED HEALTH CARE EDUCATION/TRAINING PROGRAM

## 2023-05-26 PROCEDURE — 83010 ASSAY OF HAPTOGLOBIN QUANT: CPT | Performed by: STUDENT IN AN ORGANIZED HEALTH CARE EDUCATION/TRAINING PROGRAM

## 2023-05-26 PROCEDURE — 99205 OFFICE O/P NEW HI 60 MIN: CPT | Performed by: STUDENT IN AN ORGANIZED HEALTH CARE EDUCATION/TRAINING PROGRAM

## 2023-05-26 PROCEDURE — 85025 COMPLETE CBC W/AUTO DIFF WBC: CPT | Performed by: STUDENT IN AN ORGANIZED HEALTH CARE EDUCATION/TRAINING PROGRAM

## 2023-05-26 PROCEDURE — 82728 ASSAY OF FERRITIN: CPT | Performed by: STUDENT IN AN ORGANIZED HEALTH CARE EDUCATION/TRAINING PROGRAM

## 2023-05-26 PROCEDURE — 36415 COLL VENOUS BLD VENIPUNCTURE: CPT | Performed by: STUDENT IN AN ORGANIZED HEALTH CARE EDUCATION/TRAINING PROGRAM

## 2023-05-26 PROCEDURE — 80053 COMPREHEN METABOLIC PANEL: CPT | Performed by: STUDENT IN AN ORGANIZED HEALTH CARE EDUCATION/TRAINING PROGRAM

## 2023-05-26 PROCEDURE — 85045 AUTOMATED RETICULOCYTE COUNT: CPT | Performed by: STUDENT IN AN ORGANIZED HEALTH CARE EDUCATION/TRAINING PROGRAM

## 2023-05-26 PROCEDURE — G0463 HOSPITAL OUTPT CLINIC VISIT: HCPCS | Performed by: STUDENT IN AN ORGANIZED HEALTH CARE EDUCATION/TRAINING PROGRAM

## 2023-05-26 PROCEDURE — 83615 LACTATE (LD) (LDH) ENZYME: CPT | Performed by: STUDENT IN AN ORGANIZED HEALTH CARE EDUCATION/TRAINING PROGRAM

## 2023-05-26 RX ORDER — ACYCLOVIR 400 MG/1
TABLET ORAL
Status: ON HOLD | COMMUNITY
Start: 2023-03-02 | End: 2024-03-04

## 2023-05-26 RX ORDER — DOCUSATE SODIUM 100 MG/1
100 CAPSULE, LIQUID FILLED ORAL
Status: ON HOLD | COMMUNITY
Start: 2023-02-20 | End: 2024-03-04

## 2023-05-26 RX ORDER — DIAZEPAM 5 MG
TABLET ORAL
Status: ON HOLD | COMMUNITY
Start: 2023-03-14 | End: 2024-03-04

## 2023-05-26 ASSESSMENT — PAIN SCALES - GENERAL: PAINLEVEL: NO PAIN (0)

## 2023-05-26 NOTE — LETTER
2023         RE: Susan Puckett  [address] [facility] [address]        Dear Colleague,    Thank you for referring your patient, Susan Puckett, to the [facility]. Please see a copy of my visit note below.        [facility] Consultation    Outpatient Visit Note:    Patient: Susan Puckett  MRN: [id number]  : 1970  RHONDA: May 22, 2023    Reason for Consultation: Anemia.    Assessment:  Susan Puckett is a 52 year old woman with a history of liver transplant on tacrolimus and gastric bypass surgery referred for evaluation of anemia.    Her anemia is somewhat improved today and her hemolysis parameters are improved as well.  I think that her anemia was partially driven by hemolysis related to dapsone, and stopping this has resulted in improvement.  It also appears that she is iron deficient, and this may be contributing to her anemia as well, as well as her symptoms of fatigue.  I think she should receive IV iron, as I am not confident that oral iron will work given her history of gastric bypass surgery.    She also has had mild neutropenia for the last few months.  This is improved today as well.  I am not certain of the explanation for this, perhaps this is a side effect of tacrolimus.  This should be monitored, but I do not think any further investigation is needed at this time.    Finally, I note hyperkalemia on labs today, as well as intermittently over the last several months.  The reason for this is not immediately clear.  Her renal function is normal.  She does have hyperglycemia, which could be a contributor.  Perhaps there was hemolysis of her blood sample, as her LDH was elevated also.  I will reach out to her to see who manages her diabetes, as perhaps she needs tighter control.    I will need to clarify with her whether she wants to continue to follow here and receive IV iron here, or if she wants to follow-up with her other  hematologist.    Plan:  -Clarify follow-up plan  -Should receive IV iron  -Monitor white count/ANC and hemoglobin  -Monitor potassium, consider adjustment of diabetes therapy    The patient is given our center's contact information and is instructed to call if she should have any further questions or concerns.    Keke Dang, nurse clinician, is assigned to provide patient care coordination and education.     Jacob Cogan, MD  Hematology    60 minutes spent by me on the date of the encounter doing chart review, history and exam, documentation and further activities per the note  ----------------------------------------------------------------------------------------------------------------------    History of Present Illness:  Susan Puckett is a 52 year old woman with a history of CUETO s/p liver transplant (November 2022, on tacrolimus), gastric bypass surgery (2006), referred for evaluation of hemolytic anemia.    Most recent CBC in early May with a white count of 2300/mcL (ANC 1300/mcL), hemoglobin 9.4 g/dL with an MCV of 104, and a platelet count of 133,000/mcL.  Adequate reticulocytosis for degree of anemia.  Normal bilirubin levels.  B12 folate and iron levels normal.  Haptoglobin less than 3 mg/dL.  LDH elevated at 306 units/L.  PIERCE negative.  Peripheral smear with moderate macrocytic anemia with increased erythrocyte regeneration and rare bite cells, as well as moderate leukocytopenia with lymphocytopenia.  There was a suggestion of Humza body hemolysis in the context of dapsone therapy.    Dapsone was stopped beginning of May.  She presents today at the recommendation of her outside hematologist.  She notes fatigue.  No fevers or night sweats, no unexpected weight loss, no rashes or adenopathy.  No bleeding or bruising issues.  Medications as listed below.    Past Medical History:  Past Medical History:   Diagnosis Date    Anemia     Anxiety     Cold sore     Depressive disorder     Diabetes (H)     Type  2 DM/No Insulin     Encephalopathy     Esophageal varices without bleeding (H)     grade I    History of blood transfusion     10/2019    History of seizure     diabetic seizure    Insomnia     Liver cirrhosis secondary to CUETO (H) 2020    Migraine     Pleural effusion     Thrombocytopenia (H)     Thyroid disease        Past Surgical History:  Past Surgical History:   Procedure Laterality Date    APPENDECTOMY          BENCH LIVER N/A 2022    Procedure: Bench liver;  Surgeon: Delbert Morgan MD;  Location: [facility] OR    COLONOSCOPY      2020 at [facility]     ENT SURGERY      tempanoplasty    GI SURGERY      EGD 2020 at [facility]     GYN SURGERY      laparoscopic ablation    IR THORACENTESIS  2022    IR THORACENTESIS  2022    IR THORACENTESIS  2021    IR THORACENTESIS  2021    IR THORACENTESIS  2021    IR THORACENTESIS  2021    IR THORACENTESIS  2021    IR THORACENTESIS  2021    IR THORACENTESIS  10/29/2021    IR THORACENTESIS  10/28/2021    IR THORACENTESIS  10/27/2021    IR THORACENTESIS  10/24/2021    IR THORACENTESIS  10/22/2021    IR THORACENTESIS  10/5/2021    IR THORACENTESIS  2021    IR THORACENTESIS  2021    IR THORACENTESIS  2021    IR THORACENTESIS  2021    IR THORACENTESIS  2021    IR THORACENTESIS  2021    IR THORACENTESIS  8/10/2021    IR THORACENTESIS  2021    IR THORACENTESIS  2021    IR THORACENTESIS  2021    IR THORACENTESIS  2021    IR THORACENTESIS  2021    IR THORACENTESIS  3/31/2021    IR THORACENTESIS  2020    IR TRANSVEN INTRAHEPATIC PORTOSYST SHUNT  2021    MAMMOPLASTY REDUCTION BILATERAL      N[place] STAB PHELBECTOMY VARICOSE VEINS, LESS THAN 10 INCISIONS, ONE EXTREMITY      RNY gastric bypass  2006    retoocolic, retrogastric, [facility]    TONSILLECTOMY & ADENOIDECTOMY Bilateral     TRANSPLANT LIVER RECIPIENT  DONOR N/A 2022     Procedure: TRANSPLANT, LIVER, RECIPIENT,  DONOR;  Surgeon: Delbert Morgan MD;  Location: [facility] OR    WISDOM TEETH EXTRACTION         Medications:  Current Outpatient Medications   Medication Sig Dispense Refill     magnesium glycinate lysinate chelate (DOCTOR'S BEST) 100 MG TABS tablet Take 2 tablets (200 mg) by mouth 2 times daily 540 tablet 3    acetaminophen (TYLENOL) 325 MG tablet Take 3 tablets (975 mg) by mouth every 8 hours as needed for mild pain 100 tablet 0    acyclovir (ZOVIRAX) 400 MG tablet TAKE 1 TABLET BY MOUTH THREE TIMES A DAY FOR 5 DAYS.      aspirin (ASA) 81 MG chewable tablet Take 1 tablet (81 mg) by mouth daily 90 tablet 3    calcium carbonate-vitamin D (OSCAL) 500-5 MG-MCG tablet Take 1 tablet by mouth 2 times daily      cyanocobalamin (VITAMIN B-12) 1000 MCG tablet Take 1,000 mcg by mouth every 7 days on       diazepam (VALIUM) 5 MG tablet TAKE 1 TABLET BY MOUTH ONCE FOR 1 DOSE. *BRING TO MRI APPT AND WAIT FOR FURTHER GUIDANCE*      docusate sodium (DSS) 100 MG capsule Take 100 mg by mouth      escitalopram (LEXAPRO) 20 MG tablet Take 30 mg by mouth daily      fludrocortisone (FLORINEF) 0.1 MG tablet Take 1 tablet (0.1 mg) by mouth daily 90 tablet 3    folic acid (FOLVITE) 1 MG tablet Take 1 mg by mouth every morning      gabapentin (NEURONTIN) 100 MG capsule Take 300 mg by mouth 3 times daily      glipiZIDE (GLUCOTROL XL) 5 MG 24 hr tablet Take 5 mg by mouth daily      hydrOXYzine (ATARAX) 25 MG tablet Take 25 mg by mouth 3 times daily as needed for anxiety      levothyroxine (SYNTHROID/LEVOTHROID) 125 MCG tablet Take 125 mcg by mouth daily      LORazepam (ATIVAN) 0.5 MG tablet Take 0.5 mg by mouth nightly as needed For anxiety      melatonin 5 MG tablet Take 5 mg by mouth At Bedtime      ondansetron (ZOFRAN ODT) 8 MG ODT tab Take 1 tablet (8 mg) by mouth every 8 hours as needed for nausea 30 tablet 0    SUMAtriptan (IMITREX) 20 MG/ACT nasal spray Spray 1 spray in nostril as  needed for migraine      tacrolimus (GENERIC EQUIVALENT) 0.5 MG capsule Take 1 capsule (0.5 mg) by mouth as needed (For dose changes) Total dose 5 mg BID 90 capsule 3    tacrolimus (GENERIC EQUIVALENT) 1 MG capsule Take 3 capsules (3 mg) by mouth every morning AND 4 capsules (4 mg) every evening. Total dose 3 mg am, 4 mg pm 630 capsule 11    tacrolimus (GENERIC EQUIVALENT) 5 MG capsule Take 1 capsule (5 mg) by mouth every evening Total dose 4mg am, 5mg pm 90 capsule 3    topiramate (TOPAMAX) 25 MG tablet Take 100 mg by mouth At Bedtime      traZODone (DESYREL) 50 MG tablet Take 25 mg by mouth At Bedtime      amylase-lipase-protease (CREON 24) 06212-36160 units CPEP per EC capsule Take 2 capsules by mouth 3 times daily (with meals) (Patient not taking: Reported on 5/10/2023) 180 capsule 3    amylase-lipase-protease (CREON 24) 16295-95373 units CPEP per EC capsule Take 1 capsule by mouth Take with snacks or supplements (with snacks) (Patient not taking: Reported on 5/10/2023) 90 capsule 3    Continuous Blood Gluc  (DEXCOM G6 ) LUNA Use as directed for continuous glucose monitoring. (Patient not taking: Reported on 5/26/2023)      Continuous Blood Gluc Sensor (DEXCOM G6 SENSOR) MISC Use as directed for continuous glucose monitoring.  Change sensor every 10 days. (Patient not taking: Reported on 5/26/2023)      Continuous Blood Gluc Transmit (DEXCOM G6 TRANSMITTER) MISC Use as directed for continuous glucose monitoring.  Change every 3 months. (Patient not taking: Reported on 5/26/2023)          Allergies:  Allergies   Allergen Reactions    Methylprednisolone Hives     Per patient, reaction occurred in 1999 or earlier. Broke out in round, flat hives in neck and chest area. No shortness of breath or swelling. Unknown if reaction occurred immediately after administration.     Adhesive Tape Itching    Oxycodone      slurring    Droperidol Anxiety    Nsaids Other (See Comments)     GI bleed.    Prednisone  "Anxiety, Hives and Rash     Per patient she has tolerated this since       ROS:  A 14 point ROS is negative except as stated in the HPI    Social History:  Denies any tobacco use. No significant alcohol use. Denies any illicit drug use.     Family History:  Non-contributory to the current presentation.    Objective:  BP (!) 142/87   Pulse 64   Temp 98  F (36.7  C) (Oral)   Resp 12   Ht 1.685 m (5' 6.34\")   Wt 70.1 kg (154 lb 9.6 oz)   SpO2 99%   BMI 24.70 kg/m    Exam:   Constitutional: Appears well, no distress  HEENT: Pupils equal and reactive to light. No scleral icterus or hemorrhage. Nares without evidence of telangiectasia. Mucous membranes moist with no wet purpura. Dentition overall ok with no signs of decay. No pharyngeal exudates. No lymphadenopathy, no thyromeagaly  CV: regular rate and rhythm, no murmurs  Respiratory: clear  GI: abdomen soft, nontender, without guarding or rebound. No hepatomeagaly. No splenomegaly. Rectal exam deferred.   Mus/Skele: no edema  Skin: no petechiae, no ecchymosis.  Neuro: CN II-XII intact. Normal gait. AOx3  Heme/Lymph: no supraclavicular, axillary or umbilical adenopathy.     Labs:  WBC   Date Value Ref Range Status   07/05/2021 4.0 4.0 - 11.0 10e9/L Final   05/18/2021 3.3 (L) 4.0 - 11.0 10e9/L Final   03/19/2021 4.6 4.0 - 11.0 10e9/L Final   02/25/2021 4.9 4.0 - 11.0 10e9/L Final     WBC Count   Date Value Ref Range Status   05/08/2023 2.3 (L) 4.0 - 11.0 10e3/uL Final   05/08/2023 2.3 (L) 4.0 - 11.0 10e3/uL Final   04/11/2023 2.9 (L) 4.0 - 11.0 10e3/uL Final   04/10/2023 2.7 (L) 4.0 - 11.0 10e3/uL Final     Hemoglobin   Date Value Ref Range Status   05/08/2023 9.4 (L) 11.7 - 15.7 g/dL Final   04/11/2023 9.0 (L) 11.7 - 15.7 g/dL Final   04/10/2023 8.6 (L) 11.7 - 15.7 g/dL Final   03/27/2023 8.4 (L) 11.7 - 15.7 g/dL Final   07/05/2021 11.5 (L) 11.7 - 15.7 g/dL Final   05/18/2021 11.3 (L) 11.7 - 15.7 g/dL Final   03/19/2021 11.4 (L) 11.7 - 15.7 g/dL Final   02/25/2021 " 10.1 (L) 11.7 - 15.7 g/dL Final     Hematocrit   Date Value Ref Range Status   05/08/2023 30.6 (L) 35.0 - 47.0 % Final   04/11/2023 29.8 (L) 35.0 - 47.0 % Final   04/10/2023 28.2 (L) 35.0 - 47.0 % Final   03/27/2023 27.4 (L) 35.0 - 47.0 % Final   07/05/2021 34.4 (L) 35.0 - 47.0 % Final   05/18/2021 35.0 35.0 - 47.0 % Final   03/19/2021 35.2 35.0 - 47.0 % Final   02/25/2021 29.6 (L) 35.0 - 47.0 % Final     Platelet Count   Date Value Ref Range Status   05/08/2023 133 (L) 150 - 450 10e3/uL Final   04/11/2023 140 (L) 150 - 450 10e3/uL Final   04/10/2023 128 (L) 150 - 450 10e3/uL Final   03/27/2023 120 (L) 150 - 450 10e3/uL Final   07/05/2021 104 (L) 150 - 450 10e9/L Final   05/18/2021 103 (L) 150 - 450 10e9/L Final   03/19/2021 104 (L) 150 - 450 10e9/L Final   02/25/2021 77 (L) 150 - 450 10e9/L Final       Imaging:  XR Abdomen 1 View  Narrative: Exam: XR ABDOMEN 1 VIEW, 1/19/2023 4:33 PM    Indication: Assess stool burden    Comparison: CT: 1/18/2023    Findings:   Frontal radiograph of the abdomen. Clips in the right upper quadrant.  Nonobstructive bowel gas pattern. Mild colonic stool burden. No  pneumatosis or portal venous gas. Loop recorder over the right  abdomen. No aggressive osseous lesions. Lung bases are grossly  unremarkable.  Impression: Impression:   Nonobstructive bowel gas pattern. Mild colonic stool burden.    I have personally reviewed the examination and initial interpretation  and I agree with the findings.    CHRISTOS DONG MD         SYSTEM ID:  [id number]

## 2023-05-26 NOTE — TELEPHONE ENCOUNTER
----- Message from Raphael Cook MD sent at 5/26/2023  2:08 PM CDT -----  Wow.  I did and I guess we will have to restart it.    ----- Message -----  From: Megan Joseph RN  Sent: 5/26/2023   1:37 PM CDT  To: Raphael Cook MD    Hi Dr. Cook-  I saw in your note from Tuesday that you were going to stop Susan's florinef. Her K is 5.8 today. If you stopped it, would you like it restarted?  Thanks,   Megan      Call to Susan to restart florinef. She is agreeable to restarting. Reviewed labs from today with her.   Florinef had not been discontinued from her med list in [facility], so no change made to med list.

## 2023-05-26 NOTE — NURSING NOTE
"Oncology Rooming Note    May 26, 2023 11:17 AM   Susan Puckett is a 52 year old female who presents for:    Chief Complaint   Patient presents with     Oncology Clinic Visit     Liver replaced by transplant; Low hemoglobin        Initial Vitals: BP (!) 142/87   Pulse 64   Temp 98  F (36.7  C) (Oral)   Resp 12   Ht 1.685 m (5' 6.34\")   Wt 70.1 kg (154 lb 9.6 oz)   SpO2 99%   BMI 24.70 kg/m   Estimated body mass index is 24.7 kg/m  as calculated from the following:    Height as of this encounter: 1.685 m (5' 6.34\").    Weight as of this encounter: 70.1 kg (154 lb 9.6 oz). Body surface area is 1.81 meters squared.  No Pain (0) Comment: Data Unavailable   No LMP recorded. Patient is perimenopausal.  Allergies reviewed: Yes  Medications reviewed: Yes    Medications: Medication refills not needed today.  Pharmacy name entered into [facility]:    [facility] - [place], [place] - [address]  [facility] [address] [place] [facility] - [place], [place] - [address] [facility] [place] - [place], [place] - [address]    Clinical concerns: NONE      Onelia Zambrano            "

## 2023-05-26 NOTE — NURSING NOTE
Labs drawn in clinic by LPN via venipuncture in right arm with 21 G.    Patient tolerated well and had no issues.    Silverio Walsh LPN

## 2023-05-26 NOTE — PROGRESS NOTES
[facility]: Cancer Care                                                                                          RNCC called patient to discuss canceling her lab appointment today.     Per Dr. Cogan her lab appointment can be canceled and she will get labs in the room if needed after he see's her. Patient stated an understanding of this and had no other questions.     RNCC encouraged her to call if needed.     Signature:  Keke Dang RN

## 2023-05-28 ENCOUNTER — TELEPHONE (OUTPATIENT)
Dept: TRANSPLANT | Facility: CLINIC | Age: 53
End: 2023-05-28
Payer: COMMERCIAL

## 2023-05-28 NOTE — TELEPHONE ENCOUNTER
TRIAGE CALL    Patient's  paged to inform RNCC that patient had collapsed,  Had what he thinks was a seizure, and hit her head.  EMT called.  Patient is with  and her mother.  They have patient laying on her side.  Patient able to state her name,  Unaware of what year it currently is.      BG mid 60s, no BP cuff available, patients  is unsure what HR is.    Remained on the line with [facility] until EMT arrived.  Patient will be transferred via ambulance to [facility] in [facility] as they are currently out of town, ~1 hour North of [place].     Patient has not yet taken her IS this morning.  Instructed [facility] to bring her medications with them.    Provider to Provider phone number given to EMT via phone for admitting physician to contact.  Liver surgery fellow aware, Dr Leventhal also paged.    Ana María Grant RN   Transplant Coordinator  [phone]

## 2023-05-30 ENCOUNTER — TELEPHONE (OUTPATIENT)
Dept: TRANSPLANT | Facility: CLINIC | Age: 53
End: 2023-05-30
Payer: COMMERCIAL

## 2023-05-30 DIAGNOSIS — Z94.4 STATUS POST LIVER TRANSPLANTATION (H): ICD-10-CM

## 2023-05-30 DIAGNOSIS — D70.2 OTHER DRUG-INDUCED NEUTROPENIA (H): ICD-10-CM

## 2023-05-30 DIAGNOSIS — D70.2 DRUG-INDUCED NEUTROPENIA (H): Primary | ICD-10-CM

## 2023-05-30 DIAGNOSIS — D50.0 IRON DEFICIENCY ANEMIA DUE TO CHRONIC BLOOD LOSS: Primary | ICD-10-CM

## 2023-05-30 LAB — HAPTOGLOB SERPL-MCNC: <3 MG/DL (ref 32–197)

## 2023-05-30 RX ORDER — HEPARIN SODIUM,PORCINE 10 UNIT/ML
5 VIAL (ML) INTRAVENOUS
Status: CANCELLED | OUTPATIENT
Start: 2023-05-30

## 2023-05-30 RX ORDER — DIPHENHYDRAMINE HYDROCHLORIDE 50 MG/ML
50 INJECTION INTRAMUSCULAR; INTRAVENOUS
Status: CANCELLED
Start: 2023-05-30

## 2023-05-30 RX ORDER — MEPERIDINE HYDROCHLORIDE 25 MG/ML
25 INJECTION INTRAMUSCULAR; INTRAVENOUS; SUBCUTANEOUS EVERY 30 MIN PRN
Status: CANCELLED | OUTPATIENT
Start: 2023-05-30

## 2023-05-30 RX ORDER — EPINEPHRINE 1 MG/ML
0.3 INJECTION, SOLUTION, CONCENTRATE INTRAVENOUS EVERY 5 MIN PRN
Status: CANCELLED | OUTPATIENT
Start: 2023-05-30

## 2023-05-30 RX ORDER — ALBUTEROL SULFATE 0.83 MG/ML
2.5 SOLUTION RESPIRATORY (INHALATION)
Status: CANCELLED | OUTPATIENT
Start: 2023-05-30

## 2023-05-30 RX ORDER — ALBUTEROL SULFATE 90 UG/1
1-2 AEROSOL, METERED RESPIRATORY (INHALATION)
Status: CANCELLED
Start: 2023-05-30

## 2023-05-30 RX ORDER — HEPARIN SODIUM (PORCINE) LOCK FLUSH IV SOLN 100 UNIT/ML 100 UNIT/ML
5 SOLUTION INTRAVENOUS
Status: CANCELLED | OUTPATIENT
Start: 2023-05-30

## 2023-05-30 NOTE — TELEPHONE ENCOUNTER
Call to Susan to check in after admission in [place]. New diabetes medication started recently. Outside all day Saturday. Sunday morning, felt like glucose was low, it was, and then she had a seizure. Upon falling with seizure she sprained ankle, bruised chin and cheek, and hurt her back.  Admitted in [place] Sunday into Monday and then discharged. Follow up with endocrinology on Friday. Susan stated she is really exhausted but will keep me updated with her progress.

## 2023-05-31 ENCOUNTER — OFFICE VISIT (OUTPATIENT)
Dept: URGENT CARE | Facility: URGENT CARE | Age: 53
End: 2023-05-31
Payer: COMMERCIAL

## 2023-05-31 VITALS
TEMPERATURE: 97.6 F | SYSTOLIC BLOOD PRESSURE: 137 MMHG | WEIGHT: 154 LBS | BODY MASS INDEX: 24.6 KG/M2 | DIASTOLIC BLOOD PRESSURE: 86 MMHG | RESPIRATION RATE: 16 BRPM | HEART RATE: 61 BPM | OXYGEN SATURATION: 96 %

## 2023-05-31 DIAGNOSIS — D50.0 IRON DEFICIENCY ANEMIA DUE TO CHRONIC BLOOD LOSS: Primary | ICD-10-CM

## 2023-05-31 DIAGNOSIS — D70.2 DRUG-INDUCED NEUTROPENIA (H): ICD-10-CM

## 2023-05-31 DIAGNOSIS — Z94.4 STATUS POST LIVER TRANSPLANTATION (H): ICD-10-CM

## 2023-05-31 DIAGNOSIS — D70.2 OTHER DRUG-INDUCED NEUTROPENIA (H): ICD-10-CM

## 2023-05-31 DIAGNOSIS — M79.671 RIGHT FOOT PAIN: Primary | ICD-10-CM

## 2023-05-31 PROCEDURE — 99213 OFFICE O/P EST LOW 20 MIN: CPT | Performed by: PHYSICIAN ASSISTANT

## 2023-05-31 RX ORDER — MEPERIDINE HYDROCHLORIDE 25 MG/ML
25 INJECTION INTRAMUSCULAR; INTRAVENOUS; SUBCUTANEOUS EVERY 30 MIN PRN
Status: CANCELLED | OUTPATIENT
Start: 2023-05-31

## 2023-05-31 RX ORDER — HEPARIN SODIUM,PORCINE 10 UNIT/ML
5 VIAL (ML) INTRAVENOUS
Status: CANCELLED | OUTPATIENT
Start: 2023-05-31

## 2023-05-31 RX ORDER — ALBUTEROL SULFATE 90 UG/1
1-2 AEROSOL, METERED RESPIRATORY (INHALATION)
Status: CANCELLED
Start: 2023-05-31

## 2023-05-31 RX ORDER — HEPARIN SODIUM (PORCINE) LOCK FLUSH IV SOLN 100 UNIT/ML 100 UNIT/ML
5 SOLUTION INTRAVENOUS
Status: CANCELLED | OUTPATIENT
Start: 2023-05-31

## 2023-05-31 RX ORDER — DIPHENHYDRAMINE HYDROCHLORIDE 50 MG/ML
50 INJECTION INTRAMUSCULAR; INTRAVENOUS
Status: CANCELLED
Start: 2023-05-31

## 2023-05-31 RX ORDER — EPINEPHRINE 1 MG/ML
0.3 INJECTION, SOLUTION INTRAMUSCULAR; SUBCUTANEOUS EVERY 5 MIN PRN
Status: CANCELLED | OUTPATIENT
Start: 2023-05-31

## 2023-05-31 RX ORDER — ALBUTEROL SULFATE 0.83 MG/ML
2.5 SOLUTION RESPIRATORY (INHALATION)
Status: CANCELLED | OUTPATIENT
Start: 2023-05-31

## 2023-05-31 RX ORDER — METHYLPREDNISOLONE SODIUM SUCCINATE 125 MG/2ML
125 INJECTION, POWDER, LYOPHILIZED, FOR SOLUTION INTRAMUSCULAR; INTRAVENOUS
Status: CANCELLED
Start: 2023-05-31

## 2023-05-31 ASSESSMENT — ENCOUNTER SYMPTOMS
NECK STIFFNESS: 0
ARTHRALGIAS: 1
DIZZINESS: 0
WOUND: 0
DIARRHEA: 0
SORE THROAT: 0
CHILLS: 0
RESPIRATORY NEGATIVE: 1
RHINORRHEA: 0
VOMITING: 0
BACK PAIN: 0
CARDIOVASCULAR NEGATIVE: 1
NECK PAIN: 0
LIGHT-HEADEDNESS: 0
COUGH: 0
ENDOCRINE NEGATIVE: 1
FEVER: 0
SHORTNESS OF BREATH: 0
HEADACHES: 0
WEAKNESS: 0
ALLERGIC/IMMUNOLOGIC NEGATIVE: 1
PALPITATIONS: 0
MYALGIAS: 0
EYES NEGATIVE: 1
NAUSEA: 0
JOINT SWELLING: 0

## 2023-05-31 NOTE — PROGRESS NOTES
Chief Complaint:     Chief Complaint   Patient presents with     Foot Pain        ASSESSMENT     1. Right foot pain      PLAN      RICE discussed  With level of pain and swelling, concern for stress fracture.  Patient placed in walking boot for comfort.  Order placed for follow up with ortho.  Recommended rest and avoidance of activities which cause pain or swelling.  Pain relief: Acetaminophen and or Ibuprofen with food.  Patient verbalized understanding, and agrees with this plan.       HPI: Susan Puckett is an 52 year old female who presents today for evaluation of R foot injury.  Patient injured the foot 3 days ago when she fell.  She was evaluated in the ED and imaging was negative for any fracture.  She continues to have pain and swelling in the foot.  She is not able to walk on it without the use of a cane.      Patient denies any numbness, tingling, or dysfunction of the R foot.      ROS:      Review of Systems   Constitutional: Negative for chills and fever.   HENT: Negative for congestion, ear pain, rhinorrhea and sore throat.    Eyes: Negative.    Respiratory: Negative.  Negative for cough and shortness of breath.    Cardiovascular: Negative.  Negative for chest pain and palpitations.   Gastrointestinal: Negative for diarrhea, nausea and vomiting.   Endocrine: Negative.    Genitourinary: Negative.    Musculoskeletal: Positive for arthralgias. Negative for back pain, joint swelling, myalgias, neck pain and neck stiffness.   Skin: Negative.  Negative for rash and wound.   Allergic/Immunologic: Negative.  Negative for immunocompromised state.   Neurological: Negative for dizziness, weakness, light-headedness and headaches.        Problem history  Patient Active Problem List   Diagnosis     Anxiety     Ascites     Benign tumor of colon     Brow ptosis, bilateral     Chronic diarrhea     Chronic peritoneal effusion     Colitis     Endometriosis     Hepatic encephalopathy (H)     History of gastric bypass      Insomnia     Hypothyroidism     HSV-1 (herpes simplex virus 1) infection     Iron deficiency anemia     Raynaud phenomenon     Ptosis of eyelid     Pleural effusion associated with hepatic disorder     Thrombocytopenia (H)     Other headache syndrome     Nausea     Major depressive disorder, recurrent episode, moderate (H)     Infertility management     History of gastric ulcer     Allergic rhinitis     Tubular adenoma of colon     Vitamin D deficiency     Visual field defect     Type 2 diabetes mellitus without complication, without long-term current use of insulin (H)     Primary hypothyroidism     H/O gastric bypass     Exposure to COVID-19 virus     Abdominal pain, epigastric     Steroid-induced hyperglycemia     Hypomagnesemia     Prolonged Q-T interval on ECG     Liver transplanted (H)     Acute kidney failure with tubular necrosis (H)     Hypervolemia     Immunosuppressed status (H)     Anemia in other chronic diseases classified elsewhere     Hyponatremia     Hypoalbuminemia     GEORGETTE (acute kidney injury) (H)     Acute post-operative pain     Seizure due to hypoglycemia (H)     Migraine     Moderate malnutrition (H)     Pancreatic insufficiency     Constipation     Hypophosphatemia     Generalized weakness     Status post liver transplantation (H)     Bilateral leg pain     Acute cystitis without hematuria     Bilateral lower extremity edema     Urinary tract infection     Generalized abdominal pain     Vomiting and diarrhea     Leukopenia     RUQ abdominal pain     Drug-induced neutropenia (H)     Iron deficiency anemia due to chronic blood loss        Allergies  Allergies   Allergen Reactions     Methylprednisolone Hives     Per patient, reaction occurred in 1999 or earlier. Broke out in round, flat hives in neck and chest area. No shortness of breath or swelling. Unknown if reaction occurred immediately after administration.      Adhesive Tape Itching     Oxycodone      slurring     Droperidol Anxiety      Nsaids Other (See Comments)     GI bleed.     Prednisone Anxiety, Hives and Rash     Per patient she has tolerated this since        Smoking History  History   Smoking Status     Never   Smokeless Tobacco     Never        Current Meds    Current Outpatient Medications:       magnesium glycinate lysinate chelate (DOCTOR'S BEST) 100 MG TABS tablet, Take 2 tablets (200 mg) by mouth 2 times daily, Disp: 540 tablet, Rfl: 3     acetaminophen (TYLENOL) 325 MG tablet, Take 3 tablets (975 mg) by mouth every 8 hours as needed for mild pain, Disp: 100 tablet, Rfl: 0     acyclovir (ZOVIRAX) 400 MG tablet, TAKE 1 TABLET BY MOUTH THREE TIMES A DAY FOR 5 DAYS., Disp: , Rfl:      aspirin (ASA) 81 MG chewable tablet, Take 1 tablet (81 mg) by mouth daily, Disp: 90 tablet, Rfl: 3     calcium carbonate-vitamin D (OSCAL) 500-5 MG-MCG tablet, Take 1 tablet by mouth 2 times daily, Disp: , Rfl:      Continuous Blood Gluc  (DEXCOM G6 ) LUNA, Use as directed for continuous glucose monitoring. (Patient not taking: Reported on 5/26/2023), Disp: , Rfl:      Continuous Blood Gluc Sensor (DEXCOM G6 SENSOR) MISC, Use as directed for continuous glucose monitoring.  Change sensor every 10 days. (Patient not taking: Reported on 5/26/2023), Disp: , Rfl:      Continuous Blood Gluc Transmit (DEXCOM G6 TRANSMITTER) MISC, Use as directed for continuous glucose monitoring.  Change every 3 months. (Patient not taking: Reported on 5/26/2023), Disp: , Rfl:      cyanocobalamin (VITAMIN B-12) 1000 MCG tablet, Take 1,000 mcg by mouth every 7 days on Wednesdays, Disp: , Rfl:      diazepam (VALIUM) 5 MG tablet, TAKE 1 TABLET BY MOUTH ONCE FOR 1 DOSE. *BRING TO MRI APPT AND WAIT FOR FURTHER GUIDANCE*, Disp: , Rfl:      docusate sodium (DSS) 100 MG capsule, Take 100 mg by mouth, Disp: , Rfl:      escitalopram (LEXAPRO) 20 MG tablet, Take 30 mg by mouth daily, Disp: , Rfl:      fludrocortisone (FLORINEF) 0.1 MG tablet, Take 1 tablet (0.1 mg) by mouth  daily, Disp: 90 tablet, Rfl: 3     folic acid (FOLVITE) 1 MG tablet, Take 1 mg by mouth every morning, Disp: , Rfl:      gabapentin (NEURONTIN) 100 MG capsule, Take 300 mg by mouth 3 times daily, Disp: , Rfl:      glipiZIDE (GLUCOTROL XL) 5 MG 24 hr tablet, Take 5 mg by mouth daily, Disp: , Rfl:      hydrOXYzine (ATARAX) 25 MG tablet, Take 25 mg by mouth 3 times daily as needed for anxiety, Disp: , Rfl:      levothyroxine (SYNTHROID/LEVOTHROID) 125 MCG tablet, Take 125 mcg by mouth daily, Disp: , Rfl:      LORazepam (ATIVAN) 0.5 MG tablet, Take 0.5 mg by mouth nightly as needed For anxiety, Disp: , Rfl:      melatonin 5 MG tablet, Take 5 mg by mouth At Bedtime, Disp: , Rfl:      ondansetron (ZOFRAN ODT) 8 MG ODT tab, Take 1 tablet (8 mg) by mouth every 8 hours as needed for nausea, Disp: 30 tablet, Rfl: 0     SUMAtriptan (IMITREX) 20 MG/ACT nasal spray, Spray 1 spray in nostril as needed for migraine, Disp: , Rfl:      tacrolimus (GENERIC EQUIVALENT) 0.5 MG capsule, Take 1 capsule (0.5 mg) by mouth as needed (For dose changes) Total dose 5 mg BID, Disp: 90 capsule, Rfl: 3     tacrolimus (GENERIC EQUIVALENT) 1 MG capsule, Take 3 capsules (3 mg) by mouth every morning AND 4 capsules (4 mg) every evening. Total dose 3 mg am, 4 mg pm, Disp: 630 capsule, Rfl: 11     tacrolimus (GENERIC EQUIVALENT) 5 MG capsule, Take 1 capsule (5 mg) by mouth every evening Total dose 4mg am, 5mg pm, Disp: 90 capsule, Rfl: 3     topiramate (TOPAMAX) 25 MG tablet, Take 100 mg by mouth At Bedtime, Disp: , Rfl:      traZODone (DESYREL) 50 MG tablet, Take 25 mg by mouth At Bedtime, Disp: , Rfl:         Vital signs reviewed by Marlon Carroll PA-C  /86 (BP Location: Right arm, Patient Position: Sitting, Cuff Size: Adult Large)   Pulse 61   Temp 97.6  F (36.4  C) (Tympanic)   Resp 16   Wt 69.9 kg (154 lb)   SpO2 96%   BMI 24.60 kg/m      Physical Exam     Physical Exam  Vitals and nursing note reviewed.   Constitutional:        General: She is not in acute distress.     Appearance: She is well-developed. She is not ill-appearing, toxic-appearing or diaphoretic.   HENT:      Head: Normocephalic and atraumatic.      Right Ear: Tympanic membrane and external ear normal. No drainage, swelling or tenderness. Tympanic membrane is not perforated, erythematous, retracted or bulging.      Left Ear: Tympanic membrane and external ear normal. No drainage, swelling or tenderness. Tympanic membrane is not perforated, erythematous, retracted or bulging.      Nose: No mucosal edema, congestion or rhinorrhea.      Right Sinus: No maxillary sinus tenderness or frontal sinus tenderness.      Left Sinus: No maxillary sinus tenderness or frontal sinus tenderness.      Mouth/Throat:      Pharynx: No pharyngeal swelling, oropharyngeal exudate, posterior oropharyngeal erythema or uvula swelling.      Tonsils: No tonsillar abscesses.   Eyes:      Pupils: Pupils are equal, round, and reactive to light.   Neck:      Trachea: Trachea normal.   Cardiovascular:      Rate and Rhythm: Normal rate and regular rhythm.      Heart sounds: Normal heart sounds, S1 normal and S2 normal. No murmur heard.     No friction rub. No gallop.   Pulmonary:      Effort: Pulmonary effort is normal. No respiratory distress.      Breath sounds: Normal breath sounds. No decreased breath sounds, wheezing, rhonchi or rales.   Abdominal:      General: Bowel sounds are normal. There is no distension.      Palpations: Abdomen is soft. Abdomen is not rigid. There is no mass.      Tenderness: There is no abdominal tenderness. There is no guarding or rebound.   Musculoskeletal:      Cervical back: Full passive range of motion without pain, normal range of motion and neck supple.      Right foot: Decreased range of motion. Normal capillary refill. Swelling, tenderness and bony tenderness present. No deformity or crepitus. Normal pulse.        Feet:    Lymphadenopathy:      Cervical: No cervical  adenopathy.   Skin:     General: Skin is warm and dry.   Neurological:      Mental Status: She is alert and oriented to person, place, and time.      Cranial Nerves: No cranial nerve deficit.      Deep Tendon Reflexes: Reflexes are normal and symmetric.   Psychiatric:         Behavior: Behavior normal. Behavior is cooperative.         Thought Content: Thought content normal.         Judgment: Judgment normal.             Marlon Carroll PA-C  5/31/2023, 6:37 PM

## 2023-06-01 ENCOUNTER — TELEPHONE (OUTPATIENT)
Dept: HEMATOLOGY | Facility: CLINIC | Age: 53
End: 2023-06-01
Payer: COMMERCIAL

## 2023-06-01 NOTE — TELEPHONE ENCOUNTER
[id number]  Susan Puckett  52 year old female  CBCD Diagnosis: iron deficiency anemia   CBCD Provider: Dr. Cogan     Call placed to Susan to let her know that her iron infusion was approved by the infusion finance team. Susan would like to go to [facility] and will call to schedule herself. Number provided. Susan aware that Dr. Cogan will likely want to recheck labs about 3 months post infusion.    She denies additional questions or concerns at this time. Will call prn.    Shameka FINCHN, RN, PHN   [facility]   Office: [phone]  Fax: [phone]

## 2023-06-02 ENCOUNTER — TELEPHONE (OUTPATIENT)
Dept: TRANSPLANT | Facility: CLINIC | Age: 53
End: 2023-06-02
Payer: COMMERCIAL

## 2023-06-02 NOTE — TELEPHONE ENCOUNTER
Rivera called. After Susan's seizure last weekend, she has been on tramadol for back and ankle pain, which has provided no relief. Rivera is concerned that she has been far more tired and her speech has been a little slower and slurred. Diabetes doctor advised he call liver coordinator and make an appointment with primary care. Advised them that tramadol may be contributing to her symptoms and that if it is not relieving her pain, she should stop taking it and see if symptoms improve. Also reiterated to make an appointment with primary care. Both Rivera and Susan verbalized understanding and will monitor her symptoms after stopping tramadol.

## 2023-06-05 ENCOUNTER — LAB (OUTPATIENT)
Dept: LAB | Facility: CLINIC | Age: 53
End: 2023-06-05
Payer: COMMERCIAL

## 2023-06-05 DIAGNOSIS — Z94.4 LIVER TRANSPLANTED (H): ICD-10-CM

## 2023-06-05 LAB
ALBUMIN SERPL-MCNC: 3 G/DL (ref 3.4–5)
ALP SERPL-CCNC: 114 U/L (ref 40–150)
ALT SERPL W P-5'-P-CCNC: 24 U/L (ref 0–50)
ANION GAP SERPL CALCULATED.3IONS-SCNC: 3 MMOL/L (ref 3–14)
AST SERPL W P-5'-P-CCNC: 27 U/L (ref 0–45)
BASOPHILS # BLD AUTO: 0 10E3/UL (ref 0–0.2)
BASOPHILS NFR BLD AUTO: 1 %
BILIRUB DIRECT SERPL-MCNC: <0.1 MG/DL (ref 0–0.2)
BILIRUB SERPL-MCNC: 0.2 MG/DL (ref 0.2–1.3)
BUN SERPL-MCNC: 20 MG/DL (ref 7–30)
CALCIUM SERPL-MCNC: 7.6 MG/DL (ref 8.5–10.1)
CHLORIDE BLD-SCNC: 110 MMOL/L (ref 94–109)
CO2 SERPL-SCNC: 26 MMOL/L (ref 20–32)
CREAT SERPL-MCNC: 1.13 MG/DL (ref 0.52–1.04)
EOSINOPHIL # BLD AUTO: 0.2 10E3/UL (ref 0–0.7)
EOSINOPHIL NFR BLD AUTO: 6 %
ERYTHROCYTE [DISTWIDTH] IN BLOOD BY AUTOMATED COUNT: 12.8 % (ref 10–15)
GFR SERPL CREATININE-BSD FRML MDRD: 59 ML/MIN/1.73M2
GLUCOSE BLD-MCNC: 174 MG/DL (ref 70–99)
HCT VFR BLD AUTO: 32.8 % (ref 35–47)
HGB BLD-MCNC: 10.2 G/DL (ref 11.7–15.7)
IMM GRANULOCYTES # BLD: 0 10E3/UL
IMM GRANULOCYTES NFR BLD: 0 %
LYMPHOCYTES # BLD AUTO: 1 10E3/UL (ref 0.8–5.3)
LYMPHOCYTES NFR BLD AUTO: 23 %
MAGNESIUM SERPL-MCNC: 1.6 MG/DL (ref 1.6–2.3)
MCH RBC QN AUTO: 30 PG (ref 26.5–33)
MCHC RBC AUTO-ENTMCNC: 31.1 G/DL (ref 31.5–36.5)
MCV RBC AUTO: 97 FL (ref 78–100)
MONOCYTES # BLD AUTO: 0.2 10E3/UL (ref 0–1.3)
MONOCYTES NFR BLD AUTO: 6 %
NEUTROPHILS # BLD AUTO: 2.7 10E3/UL (ref 1.6–8.3)
NEUTROPHILS NFR BLD AUTO: 64 %
NRBC # BLD AUTO: 0 10E3/UL
NRBC BLD AUTO-RTO: 0 /100
PHOSPHATE SERPL-MCNC: 3.7 MG/DL (ref 2.5–4.5)
PLATELET # BLD AUTO: 172 10E3/UL (ref 150–450)
POTASSIUM BLD-SCNC: 5 MMOL/L (ref 3.4–5.3)
PROT SERPL-MCNC: 6.3 G/DL (ref 6.8–8.8)
RBC # BLD AUTO: 3.4 10E6/UL (ref 3.8–5.2)
SODIUM SERPL-SCNC: 139 MMOL/L (ref 133–144)
WBC # BLD AUTO: 4.2 10E3/UL (ref 4–11)

## 2023-06-05 PROCEDURE — 83735 ASSAY OF MAGNESIUM: CPT

## 2023-06-05 PROCEDURE — 36415 COLL VENOUS BLD VENIPUNCTURE: CPT

## 2023-06-05 PROCEDURE — 85025 COMPLETE CBC W/AUTO DIFF WBC: CPT

## 2023-06-05 PROCEDURE — 84100 ASSAY OF PHOSPHORUS: CPT

## 2023-06-05 PROCEDURE — 80053 COMPREHEN METABOLIC PANEL: CPT

## 2023-06-05 PROCEDURE — 82248 BILIRUBIN DIRECT: CPT

## 2023-06-06 ENCOUNTER — LAB (OUTPATIENT)
Dept: LAB | Facility: CLINIC | Age: 53
End: 2023-06-06
Payer: COMMERCIAL

## 2023-06-06 DIAGNOSIS — Z94.4 LIVER TRANSPLANTED (H): ICD-10-CM

## 2023-06-06 LAB
ALBUMIN SERPL-MCNC: 3 G/DL (ref 3.4–5)
ALP SERPL-CCNC: 118 U/L (ref 40–150)
ALT SERPL W P-5'-P-CCNC: 21 U/L (ref 0–50)
ANION GAP SERPL CALCULATED.3IONS-SCNC: 4 MMOL/L (ref 3–14)
AST SERPL W P-5'-P-CCNC: 24 U/L (ref 0–45)
BASOPHILS # BLD AUTO: 0 10E3/UL (ref 0–0.2)
BASOPHILS NFR BLD AUTO: 1 %
BILIRUB DIRECT SERPL-MCNC: <0.1 MG/DL (ref 0–0.2)
BILIRUB SERPL-MCNC: 0.3 MG/DL (ref 0.2–1.3)
BUN SERPL-MCNC: 16 MG/DL (ref 7–30)
CALCIUM SERPL-MCNC: 8 MG/DL (ref 8.5–10.1)
CHLORIDE BLD-SCNC: 111 MMOL/L (ref 94–109)
CO2 SERPL-SCNC: 25 MMOL/L (ref 20–32)
CREAT SERPL-MCNC: 1.05 MG/DL (ref 0.52–1.04)
EOSINOPHIL # BLD AUTO: 0.2 10E3/UL (ref 0–0.7)
EOSINOPHIL NFR BLD AUTO: 5 %
ERYTHROCYTE [DISTWIDTH] IN BLOOD BY AUTOMATED COUNT: 12.8 % (ref 10–15)
GFR SERPL CREATININE-BSD FRML MDRD: 64 ML/MIN/1.73M2
GLUCOSE BLD-MCNC: 153 MG/DL (ref 70–99)
HCT VFR BLD AUTO: 35.3 % (ref 35–47)
HGB BLD-MCNC: 11.1 G/DL (ref 11.7–15.7)
IMM GRANULOCYTES # BLD: 0 10E3/UL
IMM GRANULOCYTES NFR BLD: 0 %
LYMPHOCYTES # BLD AUTO: 0.9 10E3/UL (ref 0.8–5.3)
LYMPHOCYTES NFR BLD AUTO: 24 %
MAGNESIUM SERPL-MCNC: 1.6 MG/DL (ref 1.6–2.3)
MCH RBC QN AUTO: 30 PG (ref 26.5–33)
MCHC RBC AUTO-ENTMCNC: 31.4 G/DL (ref 31.5–36.5)
MCV RBC AUTO: 95 FL (ref 78–100)
MONOCYTES # BLD AUTO: 0.2 10E3/UL (ref 0–1.3)
MONOCYTES NFR BLD AUTO: 6 %
NEUTROPHILS # BLD AUTO: 2.4 10E3/UL (ref 1.6–8.3)
NEUTROPHILS NFR BLD AUTO: 64 %
NRBC # BLD AUTO: 0 10E3/UL
NRBC BLD AUTO-RTO: 0 /100
PHOSPHATE SERPL-MCNC: 3.5 MG/DL (ref 2.5–4.5)
PLATELET # BLD AUTO: 202 10E3/UL (ref 150–450)
POTASSIUM BLD-SCNC: 4.6 MMOL/L (ref 3.4–5.3)
PROT SERPL-MCNC: 6.5 G/DL (ref 6.8–8.8)
RBC # BLD AUTO: 3.7 10E6/UL (ref 3.8–5.2)
SODIUM SERPL-SCNC: 140 MMOL/L (ref 133–144)
TACROLIMUS BLD-MCNC: 7.2 UG/L (ref 5–15)
TME LAST DOSE: NORMAL H
TME LAST DOSE: NORMAL H
WBC # BLD AUTO: 3.7 10E3/UL (ref 4–11)

## 2023-06-06 PROCEDURE — 80197 ASSAY OF TACROLIMUS: CPT

## 2023-06-06 PROCEDURE — 82248 BILIRUBIN DIRECT: CPT

## 2023-06-06 PROCEDURE — 36415 COLL VENOUS BLD VENIPUNCTURE: CPT

## 2023-06-06 PROCEDURE — 85025 COMPLETE CBC W/AUTO DIFF WBC: CPT

## 2023-06-06 PROCEDURE — 83735 ASSAY OF MAGNESIUM: CPT

## 2023-06-06 PROCEDURE — 80053 COMPREHEN METABOLIC PANEL: CPT

## 2023-06-06 PROCEDURE — 84100 ASSAY OF PHOSPHORUS: CPT

## 2023-06-09 ENCOUNTER — TELEPHONE (OUTPATIENT)
Dept: ONCOLOGY | Facility: CLINIC | Age: 53
End: 2023-06-09
Payer: COMMERCIAL

## 2023-06-09 NOTE — TELEPHONE ENCOUNTER
Left voicemail for patient to return call for scheduling of 07/2023 appointment.    Loida Soto MA   Pt with oral mucosa

## 2023-06-12 DIAGNOSIS — D50.0 IRON DEFICIENCY ANEMIA DUE TO CHRONIC BLOOD LOSS: Primary | ICD-10-CM

## 2023-06-12 DIAGNOSIS — Z94.4 STATUS POST LIVER TRANSPLANTATION (H): ICD-10-CM

## 2023-06-12 DIAGNOSIS — D70.2 OTHER DRUG-INDUCED NEUTROPENIA (H): ICD-10-CM

## 2023-06-12 DIAGNOSIS — D70.2 DRUG-INDUCED NEUTROPENIA (H): ICD-10-CM

## 2023-06-12 RX ORDER — DIPHENHYDRAMINE HYDROCHLORIDE 50 MG/ML
50 INJECTION INTRAMUSCULAR; INTRAVENOUS
Status: CANCELLED
Start: 2023-06-12

## 2023-06-12 RX ORDER — ALBUTEROL SULFATE 0.83 MG/ML
2.5 SOLUTION RESPIRATORY (INHALATION)
Status: CANCELLED | OUTPATIENT
Start: 2023-06-12

## 2023-06-12 RX ORDER — HEPARIN SODIUM (PORCINE) LOCK FLUSH IV SOLN 100 UNIT/ML 100 UNIT/ML
5 SOLUTION INTRAVENOUS
Status: CANCELLED | OUTPATIENT
Start: 2023-06-12

## 2023-06-12 RX ORDER — EPINEPHRINE 1 MG/ML
0.3 INJECTION, SOLUTION, CONCENTRATE INTRAVENOUS EVERY 5 MIN PRN
Status: CANCELLED | OUTPATIENT
Start: 2023-06-12

## 2023-06-12 RX ORDER — ALBUTEROL SULFATE 90 UG/1
1-2 AEROSOL, METERED RESPIRATORY (INHALATION)
Status: CANCELLED
Start: 2023-06-12

## 2023-06-12 RX ORDER — MEPERIDINE HYDROCHLORIDE 25 MG/ML
25 INJECTION INTRAMUSCULAR; INTRAVENOUS; SUBCUTANEOUS EVERY 30 MIN PRN
Status: CANCELLED | OUTPATIENT
Start: 2023-06-12

## 2023-06-12 RX ORDER — HEPARIN SODIUM,PORCINE 10 UNIT/ML
5 VIAL (ML) INTRAVENOUS
Status: CANCELLED | OUTPATIENT
Start: 2023-06-12

## 2023-06-15 ENCOUNTER — INFUSION THERAPY VISIT (OUTPATIENT)
Dept: ONCOLOGY | Facility: CLINIC | Age: 53
End: 2023-06-15
Payer: COMMERCIAL

## 2023-06-15 VITALS
TEMPERATURE: 98 F | HEART RATE: 61 BPM | OXYGEN SATURATION: 98 % | SYSTOLIC BLOOD PRESSURE: 168 MMHG | DIASTOLIC BLOOD PRESSURE: 77 MMHG | RESPIRATION RATE: 16 BRPM

## 2023-06-15 DIAGNOSIS — D70.2 OTHER DRUG-INDUCED NEUTROPENIA (H): ICD-10-CM

## 2023-06-15 DIAGNOSIS — D70.2 DRUG-INDUCED NEUTROPENIA (H): ICD-10-CM

## 2023-06-15 DIAGNOSIS — D50.0 IRON DEFICIENCY ANEMIA DUE TO CHRONIC BLOOD LOSS: Primary | ICD-10-CM

## 2023-06-15 DIAGNOSIS — Z94.4 STATUS POST LIVER TRANSPLANTATION (H): ICD-10-CM

## 2023-06-15 PROCEDURE — 250N000011 HC RX IP 250 OP 636: Performed by: STUDENT IN AN ORGANIZED HEALTH CARE EDUCATION/TRAINING PROGRAM

## 2023-06-15 PROCEDURE — 999N000128 HC STATISTIC PERIPHERAL IV START W/O US GUIDANCE

## 2023-06-15 PROCEDURE — 258N000003 HC RX IP 258 OP 636: Performed by: STUDENT IN AN ORGANIZED HEALTH CARE EDUCATION/TRAINING PROGRAM

## 2023-06-15 PROCEDURE — 96366 THER/PROPH/DIAG IV INF ADDON: CPT

## 2023-06-15 PROCEDURE — 96365 THER/PROPH/DIAG IV INF INIT: CPT

## 2023-06-15 RX ORDER — ALBUTEROL SULFATE 90 UG/1
1-2 AEROSOL, METERED RESPIRATORY (INHALATION)
Status: CANCELLED
Start: 2023-06-15

## 2023-06-15 RX ORDER — HEPARIN SODIUM (PORCINE) LOCK FLUSH IV SOLN 100 UNIT/ML 100 UNIT/ML
5 SOLUTION INTRAVENOUS
Status: CANCELLED | OUTPATIENT
Start: 2023-06-15

## 2023-06-15 RX ORDER — MEPERIDINE HYDROCHLORIDE 25 MG/ML
25 INJECTION INTRAMUSCULAR; INTRAVENOUS; SUBCUTANEOUS EVERY 30 MIN PRN
Status: CANCELLED | OUTPATIENT
Start: 2023-06-15

## 2023-06-15 RX ORDER — HEPARIN SODIUM,PORCINE 10 UNIT/ML
5 VIAL (ML) INTRAVENOUS
Status: CANCELLED | OUTPATIENT
Start: 2023-06-15

## 2023-06-15 RX ORDER — ALBUTEROL SULFATE 0.83 MG/ML
2.5 SOLUTION RESPIRATORY (INHALATION)
Status: CANCELLED | OUTPATIENT
Start: 2023-06-15

## 2023-06-15 RX ORDER — METHYLPREDNISOLONE SODIUM SUCCINATE 125 MG/2ML
125 INJECTION, POWDER, LYOPHILIZED, FOR SOLUTION INTRAMUSCULAR; INTRAVENOUS
Status: CANCELLED
Start: 2023-06-15

## 2023-06-15 RX ORDER — EPINEPHRINE 1 MG/ML
0.3 INJECTION, SOLUTION INTRAMUSCULAR; SUBCUTANEOUS EVERY 5 MIN PRN
Status: CANCELLED | OUTPATIENT
Start: 2023-06-15

## 2023-06-15 RX ORDER — DIPHENHYDRAMINE HYDROCHLORIDE 50 MG/ML
50 INJECTION INTRAMUSCULAR; INTRAVENOUS
Status: CANCELLED
Start: 2023-06-15

## 2023-06-15 RX ADMIN — SODIUM CHLORIDE 25 MG: 9 INJECTION, SOLUTION INTRAVENOUS at 10:14

## 2023-06-15 RX ADMIN — SODIUM CHLORIDE 250 ML: 9 INJECTION, SOLUTION INTRAVENOUS at 10:14

## 2023-06-15 RX ADMIN — SODIUM CHLORIDE 975 MG: 9 INJECTION, SOLUTION INTRAVENOUS at 11:49

## 2023-06-15 NOTE — PROGRESS NOTES
Infusion Nursing Note:  Susna Puckett presents today for new infed.  Patient seen by provider today: No   present during visit today: Not Applicable.    Note: Pt comes to infusion today with no questions or concerns.  Pt  has 7/10 mid back pain from a recent fall which she states is improved and denies any need for intervention at this appointment.  Pt  has been afebrile and denies signs and symptoms of infection including: cough, SOB, sore throat, diarrhea, vomiting, rash, or pain with urination.  Pt  wishes to proceed with today's planned treatment.    Intravenous Access:  Peripheral IV placed.    Treatment Conditions:  Not Applicable.    Post Infusion Assessment:  Patient tolerated infusion without incident.  Patient observed for 1 hour post test dose of infed per protocol.  Blood return noted pre and post infusion.  Site patent and intact, free from redness, edema or discomfort.  No evidence of extravasations.  Access discontinued per protocol.     Discharge Plan:   Patient declined prescription refills.  Discharge instructions reviewed with: Patient.  Patient and/or family verbalized understanding of discharge instructions and all questions answered.  AVS to patient via [employer].  Patient discharged in stable condition accompanied by: self.  Departure Mode: Ambulatory.      Emily Meese, RN

## 2023-06-15 NOTE — PATIENT INSTRUCTIONS
[facility] Triage and after hours / weekends / holidays:  [phone]    Please call the triage or after hours line if you experience a temperature greater than or equal to 100.4, shaking chills, have uncontrolled nausea, vomiting and/or diarrhea, dizziness, shortness of breath, chest pain, bleeding, unexplained bruising, or if you have any other new/concerning symptoms, questions or concerns.      If you are having any concerning symptoms or wish to speak to a provider before your next infusion visit, please call triage to notify them so we can adequately serve you.     If you need a refill on a narcotic prescription or other medication, please call before your infusion appointment.                June 2023 Sunday Monday Tuesday Wednesday Thursday Friday Saturday                       1     2     3       4     5    LAB  10:50 AM   (10 min.)   LAB FIRST FLOOR [facility] 6    LAB   8:20 AM   (10 min.)   LAB FIRST FLOOR [facility] 7     8     9     10       11     12     13     14     15    ONC INFUSION 2 HR (120 MIN)   9:00 AM   (120 min.)    ONC INFUSION NURSE   [facility] 16     17       18     19     20     21     22     23     24       25     26     27     28     29     30                        July 2023 Sunday Monday Tuesday Wednesday Thursday Friday Saturday                                 1       2     3     4     5     6     7     8       9     10     11  Happy Birthday!     12     13     14     15       16     17     18     19     20     21     22       23     24     25     26     27     28     29       30     31                                                Lab Results:  No results found for this or any previous visit (from the past 12 hour(s)).

## 2023-06-27 ENCOUNTER — TELEPHONE (OUTPATIENT)
Dept: TRANSPLANT | Facility: CLINIC | Age: 53
End: 2023-06-27
Payer: COMMERCIAL

## 2023-06-27 NOTE — TELEPHONE ENCOUNTER
Susan called back. She has been having lower leg aching for the past month. Some swelling in right lower leg that was talked about with her PCP. She did not bring up the leg aching with her PCP but will. She asked if any of her meds could be contributing and will send the transplant pharmacist a message. Informed Susan that aching legs may be due to increased activity, or a number of other things, but probably isn't liver related.

## 2023-06-30 PROBLEM — N39.0 E. COLI UTI: Status: ACTIVE | Noted: 2022-11-19

## 2023-06-30 PROBLEM — B96.20 E. COLI UTI: Status: ACTIVE | Noted: 2022-11-19

## 2023-07-07 ENCOUNTER — TELEPHONE (OUTPATIENT)
Dept: DERMATOLOGY | Facility: CLINIC | Age: 53
End: 2023-07-07
Payer: COMMERCIAL

## 2023-07-07 NOTE — TELEPHONE ENCOUNTER
Via phone got patient scheduled for the following:    Appointment type: [facility] transplant skin check  Provider: Dr. Deleon  Return date: 3/27/24  Specialty phone number: [phone]    Additonal Notes: Dr. Rivers pt

## 2023-07-10 ENCOUNTER — LAB (OUTPATIENT)
Dept: LAB | Facility: CLINIC | Age: 53
End: 2023-07-10
Attending: TRANSPLANT SURGERY
Payer: COMMERCIAL

## 2023-07-10 ENCOUNTER — TELEPHONE (OUTPATIENT)
Dept: TRANSPLANT | Facility: CLINIC | Age: 53
End: 2023-07-10

## 2023-07-10 DIAGNOSIS — Z94.4 LIVER TRANSPLANTED (H): ICD-10-CM

## 2023-07-10 LAB
ALBUMIN SERPL BCG-MCNC: 3.3 G/DL (ref 3.5–5.2)
ALP SERPL-CCNC: 107 U/L (ref 35–104)
ALT SERPL W P-5'-P-CCNC: 10 U/L (ref 0–50)
ANION GAP SERPL CALCULATED.3IONS-SCNC: 9 MMOL/L (ref 7–15)
AST SERPL W P-5'-P-CCNC: 25 U/L (ref 0–45)
BASOPHILS # BLD AUTO: 0 10E3/UL (ref 0–0.2)
BASOPHILS NFR BLD AUTO: 1 %
BILIRUB DIRECT SERPL-MCNC: <0.2 MG/DL (ref 0–0.3)
BILIRUB SERPL-MCNC: 0.2 MG/DL
BUN SERPL-MCNC: 17.6 MG/DL (ref 6–20)
CALCIUM SERPL-MCNC: 7.6 MG/DL (ref 8.6–10)
CHLORIDE SERPL-SCNC: 110 MMOL/L (ref 98–107)
CREAT SERPL-MCNC: 1.18 MG/DL (ref 0.51–0.95)
DEPRECATED HCO3 PLAS-SCNC: 19 MMOL/L (ref 22–29)
EOSINOPHIL # BLD AUTO: 0.2 10E3/UL (ref 0–0.7)
EOSINOPHIL NFR BLD AUTO: 5 %
ERYTHROCYTE [DISTWIDTH] IN BLOOD BY AUTOMATED COUNT: 15.2 % (ref 10–15)
GFR SERPL CREATININE-BSD FRML MDRD: 56 ML/MIN/1.73M2
GLUCOSE SERPL-MCNC: 415 MG/DL (ref 70–99)
HCT VFR BLD AUTO: 35.7 % (ref 35–47)
HGB BLD-MCNC: 11.2 G/DL (ref 11.7–15.7)
IMM GRANULOCYTES # BLD: 0 10E3/UL
IMM GRANULOCYTES NFR BLD: 0 %
LYMPHOCYTES # BLD AUTO: 0.8 10E3/UL (ref 0.8–5.3)
LYMPHOCYTES NFR BLD AUTO: 26 %
MAGNESIUM SERPL-MCNC: 1.5 MG/DL (ref 1.7–2.3)
MCH RBC QN AUTO: 30.2 PG (ref 26.5–33)
MCHC RBC AUTO-ENTMCNC: 31.4 G/DL (ref 31.5–36.5)
MCV RBC AUTO: 96 FL (ref 78–100)
MONOCYTES # BLD AUTO: 0.2 10E3/UL (ref 0–1.3)
MONOCYTES NFR BLD AUTO: 6 %
NEUTROPHILS # BLD AUTO: 1.9 10E3/UL (ref 1.6–8.3)
NEUTROPHILS NFR BLD AUTO: 62 %
NRBC # BLD AUTO: 0 10E3/UL
NRBC BLD AUTO-RTO: 0 /100
PHOSPHATE SERPL-MCNC: 4.5 MG/DL (ref 2.5–4.5)
PLATELET # BLD AUTO: 144 10E3/UL (ref 150–450)
POTASSIUM SERPL-SCNC: 4.5 MMOL/L (ref 3.4–5.3)
PROT SERPL-MCNC: 5.8 G/DL (ref 6.4–8.3)
RBC # BLD AUTO: 3.71 10E6/UL (ref 3.8–5.2)
SODIUM SERPL-SCNC: 138 MMOL/L (ref 136–145)
TACROLIMUS BLD-MCNC: 6.3 UG/L (ref 5–15)
TME LAST DOSE: NORMAL H
TME LAST DOSE: NORMAL H
WBC # BLD AUTO: 3.1 10E3/UL (ref 4–11)

## 2023-07-10 PROCEDURE — 80053 COMPREHEN METABOLIC PANEL: CPT

## 2023-07-10 PROCEDURE — 83735 ASSAY OF MAGNESIUM: CPT

## 2023-07-10 PROCEDURE — 84100 ASSAY OF PHOSPHORUS: CPT

## 2023-07-10 PROCEDURE — 80197 ASSAY OF TACROLIMUS: CPT

## 2023-07-10 PROCEDURE — 36415 COLL VENOUS BLD VENIPUNCTURE: CPT

## 2023-07-10 PROCEDURE — 82248 BILIRUBIN DIRECT: CPT

## 2023-07-10 PROCEDURE — 85025 COMPLETE CBC W/AUTO DIFF WBC: CPT

## 2023-07-10 NOTE — TELEPHONE ENCOUNTER
Spoke to pt who states that the blood draw was not fasting and her intake has been full of sugar. Advised pt to reach out to endocrinologist. Pt has an appt with endo next week. Also expressed concern about her WBC  Despite the fact that pt does see a hematologist. Suggested she reach out to the specialists who monitor those blood levels. Also advised pt to increase fluid intake.

## 2023-07-10 NOTE — TELEPHONE ENCOUNTER
Pt's blood sugar elevated she should talk to her PCP or endocrine, whomever is managing her diabetes regarding high blood sugar.    Creatinine increased ensure adequate hydration and await drug level.

## 2023-07-10 NOTE — TELEPHONE ENCOUNTER
Susan wanting to go over lab results with her Coordinator, and would like a call back. No other details provided to this writer

## 2023-07-17 ENCOUNTER — TELEPHONE (OUTPATIENT)
Dept: TRANSPLANT | Facility: CLINIC | Age: 53
End: 2023-07-17
Payer: COMMERCIAL

## 2023-07-17 NOTE — TELEPHONE ENCOUNTER
Patient Call: General  Route to LPN    Reason for call: pt would like a call back to discuss surgery. I asked Susan if she was having symptoms she said no.    Call back needed? Yes    Return Call Needed  Same as documented in contacts section  When to return call?: Greater than one day: Route standard priority

## 2023-08-10 ENCOUNTER — TELEPHONE (OUTPATIENT)
Dept: TRANSPLANT | Facility: CLINIC | Age: 53
End: 2023-08-10
Payer: COMMERCIAL

## 2023-08-11 ENCOUNTER — TELEPHONE (OUTPATIENT)
Dept: TRANSPLANT | Facility: CLINIC | Age: 53
End: 2023-08-11

## 2023-08-11 ENCOUNTER — LAB (OUTPATIENT)
Dept: LAB | Facility: CLINIC | Age: 53
End: 2023-08-11
Payer: COMMERCIAL

## 2023-08-11 DIAGNOSIS — D84.9 IMMUNOSUPPRESSED STATUS (H): ICD-10-CM

## 2023-08-11 DIAGNOSIS — Z94.4 LIVER REPLACED BY TRANSPLANT (H): ICD-10-CM

## 2023-08-11 DIAGNOSIS — Z94.4 LIVER REPLACED BY TRANSPLANT (H): Primary | ICD-10-CM

## 2023-08-11 DIAGNOSIS — Z94.4 LIVER TRANSPLANTED (H): ICD-10-CM

## 2023-08-11 LAB
ALBUMIN SERPL BCG-MCNC: 3.9 G/DL (ref 3.5–5.2)
ALBUMIN UR-MCNC: NEGATIVE MG/DL
ALP SERPL-CCNC: 123 U/L (ref 35–104)
ALT SERPL W P-5'-P-CCNC: 10 U/L (ref 0–50)
ANION GAP SERPL CALCULATED.3IONS-SCNC: 8 MMOL/L (ref 7–15)
APPEARANCE UR: CLEAR
AST SERPL W P-5'-P-CCNC: 23 U/L (ref 0–45)
BACTERIA #/AREA URNS HPF: ABNORMAL /HPF
BASOPHILS # BLD AUTO: 0 10E3/UL (ref 0–0.2)
BASOPHILS NFR BLD AUTO: 1 %
BILIRUB DIRECT SERPL-MCNC: <0.2 MG/DL (ref 0–0.3)
BILIRUB SERPL-MCNC: 0.4 MG/DL
BILIRUB UR QL STRIP: NEGATIVE
BUN SERPL-MCNC: 14.6 MG/DL (ref 6–20)
CALCIUM SERPL-MCNC: 8.3 MG/DL (ref 8.6–10)
CHLORIDE SERPL-SCNC: 111 MMOL/L (ref 98–107)
COLOR UR AUTO: YELLOW
CREAT SERPL-MCNC: 1.07 MG/DL (ref 0.51–0.95)
DEPRECATED HCO3 PLAS-SCNC: 24 MMOL/L (ref 22–29)
EOSINOPHIL # BLD AUTO: 0.2 10E3/UL (ref 0–0.7)
EOSINOPHIL NFR BLD AUTO: 4 %
ERYTHROCYTE [DISTWIDTH] IN BLOOD BY AUTOMATED COUNT: 15.1 % (ref 10–15)
GFR SERPL CREATININE-BSD FRML MDRD: 63 ML/MIN/1.73M2
GLUCOSE SERPL-MCNC: 131 MG/DL (ref 70–99)
GLUCOSE UR STRIP-MCNC: NEGATIVE MG/DL
HCT VFR BLD AUTO: 38.9 % (ref 35–47)
HGB BLD-MCNC: 12.5 G/DL (ref 11.7–15.7)
HGB UR QL STRIP: NEGATIVE
IMM GRANULOCYTES # BLD: 0 10E3/UL
IMM GRANULOCYTES NFR BLD: 0 %
KETONES UR STRIP-MCNC: NEGATIVE MG/DL
LEUKOCYTE ESTERASE UR QL STRIP: NEGATIVE
LYMPHOCYTES # BLD AUTO: 1 10E3/UL (ref 0.8–5.3)
LYMPHOCYTES NFR BLD AUTO: 27 %
MAGNESIUM SERPL-MCNC: 1.6 MG/DL (ref 1.7–2.3)
MCH RBC QN AUTO: 30 PG (ref 26.5–33)
MCHC RBC AUTO-ENTMCNC: 32.1 G/DL (ref 31.5–36.5)
MCV RBC AUTO: 93 FL (ref 78–100)
MONOCYTES # BLD AUTO: 0.2 10E3/UL (ref 0–1.3)
MONOCYTES NFR BLD AUTO: 6 %
NEUTROPHILS # BLD AUTO: 2.3 10E3/UL (ref 1.6–8.3)
NEUTROPHILS NFR BLD AUTO: 62 %
NITRATE UR QL: NEGATIVE
NRBC # BLD AUTO: 0 10E3/UL
NRBC BLD AUTO-RTO: 0 /100
PH UR STRIP: 5.5 [PH] (ref 5–7)
PHOSPHATE SERPL-MCNC: 3.6 MG/DL (ref 2.5–4.5)
PLATELET # BLD AUTO: 139 10E3/UL (ref 150–450)
POTASSIUM SERPL-SCNC: 4.3 MMOL/L (ref 3.4–5.3)
PROT SERPL-MCNC: 6.6 G/DL (ref 6.4–8.3)
RBC # BLD AUTO: 4.17 10E6/UL (ref 3.8–5.2)
RBC #/AREA URNS AUTO: ABNORMAL /HPF
SODIUM SERPL-SCNC: 143 MMOL/L (ref 136–145)
SP GR UR STRIP: 1.02 (ref 1–1.03)
SQUAMOUS #/AREA URNS AUTO: ABNORMAL /LPF
TACROLIMUS BLD-MCNC: 6.5 UG/L (ref 5–15)
TME LAST DOSE: NORMAL H
TME LAST DOSE: NORMAL H
UROBILINOGEN UR STRIP-MCNC: NORMAL MG/DL
WBC # BLD AUTO: 3.8 10E3/UL (ref 4–11)
WBC #/AREA URNS AUTO: ABNORMAL /HPF

## 2023-08-11 PROCEDURE — 80053 COMPREHEN METABOLIC PANEL: CPT

## 2023-08-11 PROCEDURE — 80197 ASSAY OF TACROLIMUS: CPT

## 2023-08-11 PROCEDURE — 36415 COLL VENOUS BLD VENIPUNCTURE: CPT

## 2023-08-11 PROCEDURE — 85025 COMPLETE CBC W/AUTO DIFF WBC: CPT

## 2023-08-11 PROCEDURE — 84100 ASSAY OF PHOSPHORUS: CPT

## 2023-08-11 PROCEDURE — 83735 ASSAY OF MAGNESIUM: CPT

## 2023-08-11 PROCEDURE — 81001 URINALYSIS AUTO W/SCOPE: CPT

## 2023-08-11 PROCEDURE — 82248 BILIRUBIN DIRECT: CPT

## 2023-08-11 NOTE — TELEPHONE ENCOUNTER
clinton Davisd that she is having severe abdomen pain that radiates to her back, nausea and vomiting.     Pt has been struggling with nausea, stomach pain for the last couple of days. Pt did take gas-x on Tuesday, but it did not help. Pt has had diarrhea for a week.     Pt unable to keep nausea meds down as she keeps throwing up.    Pt rating pain 10/10.     Pt encouraged to have stool cultures completed. Pt aware if pain unbearable to go to the ER but if does not go in to complete stool cultures and follow up with tx office in the AM.

## 2023-08-11 NOTE — TELEPHONE ENCOUNTER
Patient Call: General  Route to LPN    Reason for call: Susan wanting to follow up with coordinator, patient is still feeling nauseous no other details was provided to this writer     Call back needed? Yes    Return Call Needed  Same as documented in contacts section  When to return call?: Same day: Route High Priority

## 2023-08-11 NOTE — TELEPHONE ENCOUNTER
Spoke to Susan. She stated that last evening at the transplant picnic she just got hit with a wave of nausea and had a couple of times she had to pull over on her way home to throw up. When she got home she called transplant office, the on call nurse told her to go to ED. When they got to [facility] ED it was very busy and she did not want to stay.   Susan said she had horrible cramping and has had diarrhea multiple times since last evening. The cramping and nausea are better, but she is still having diarrhea.   She was supposed to have labs on Monday, but didn't go due to migraine. She is on her way to have labs drawn now. She also has an appt next week with her regular GI doctor through [facility].   Added UA/UC and stool studies to labs for today.   Will follow up when results come in.

## 2023-08-15 ENCOUNTER — TELEPHONE (OUTPATIENT)
Dept: DERMATOLOGY | Facility: CLINIC | Age: 53
End: 2023-08-15
Payer: COMMERCIAL

## 2023-08-15 NOTE — TELEPHONE ENCOUNTER
Patient Contacted  and schedule the following:    Appointment type: [facility] Transplant Skin Check  Provider: Dr. Deleon  Return date: 04/01/24  Specialty phone number: [phone]

## 2023-08-16 ENCOUNTER — APPOINTMENT (OUTPATIENT)
Dept: LAB | Facility: CLINIC | Age: 53
End: 2023-08-16
Payer: COMMERCIAL

## 2023-08-16 LAB

## 2023-08-16 PROCEDURE — 87507 IADNA-DNA/RNA PROBE TQ 12-25: CPT

## 2023-08-16 PROCEDURE — 87493 C DIFF AMPLIFIED PROBE: CPT | Mod: 59

## 2023-08-17 ENCOUNTER — TELEPHONE (OUTPATIENT)
Dept: TRANSPLANT | Facility: CLINIC | Age: 53
End: 2023-08-17
Payer: COMMERCIAL

## 2023-08-17 NOTE — TELEPHONE ENCOUNTER
Call to Susan regarding positive norovirus in her stool.   Left message with result and to call back to discuss.     ADDENDUM:  Spoke to Susan late yesterday afternoon 8/17. Discussed norovirus. She saw her GI specialist at [facility] yesterday and they also discussed norovirus.

## 2023-08-20 ENCOUNTER — HEALTH MAINTENANCE LETTER (OUTPATIENT)
Age: 53
End: 2023-08-20

## 2023-08-23 NOTE — PROGRESS NOTES
Pt discharged from [facility] on 9/23. PICC placed and pt discharged on IV Rocephin 2 grams daily (end date 10/2). TIPS scheduled for 9/28 cancelled.   Connected with pt to check in and remind of upcoming appointment with Dr. Cook on 10/5. Pt reported feeling better since discharge. Denied SOB and cough. Pt does not have a future thoracentesis scheduled but stated that she is typically able to get an appointment within 1-2 days if needed. Pt confirmed that antibiotic will finish on 10/2 and PICC line will be removed. Kathy Munoz, transplant coordinator, sent message to IR following up on timing of rescheduling TIPS.      Pt is having regular bowel movements and denied confusion. Pt continues to report abdominal distension and fullness but no ascites found on ultrasound.     Plan to follow up with pt after appointment with Dr. Cook. Encouraged pt to call with any questions and/or concerns. Pt verbalized understanding and is agreeable to plan of care.                no

## 2023-08-31 DIAGNOSIS — D50.0 IRON DEFICIENCY ANEMIA DUE TO CHRONIC BLOOD LOSS: Primary | ICD-10-CM

## 2023-09-05 ENCOUNTER — LAB (OUTPATIENT)
Dept: LAB | Facility: CLINIC | Age: 53
End: 2023-09-05
Attending: TRANSPLANT SURGERY
Payer: COMMERCIAL

## 2023-09-05 DIAGNOSIS — Z94.4 LIVER TRANSPLANTED (H): ICD-10-CM

## 2023-09-05 DIAGNOSIS — Z94.4 LIVER REPLACED BY TRANSPLANT (H): Primary | ICD-10-CM

## 2023-09-05 DIAGNOSIS — D50.0 IRON DEFICIENCY ANEMIA DUE TO CHRONIC BLOOD LOSS: ICD-10-CM

## 2023-09-05 LAB
ALBUMIN SERPL BCG-MCNC: 3.6 G/DL (ref 3.5–5.2)
ALP SERPL-CCNC: 112 U/L (ref 35–104)
ALT SERPL W P-5'-P-CCNC: <5 U/L (ref 0–50)
ANION GAP SERPL CALCULATED.3IONS-SCNC: 7 MMOL/L (ref 7–15)
AST SERPL W P-5'-P-CCNC: 18 U/L (ref 0–45)
BASOPHILS # BLD AUTO: 0 10E3/UL (ref 0–0.2)
BASOPHILS NFR BLD AUTO: 1 %
BILIRUB DIRECT SERPL-MCNC: <0.2 MG/DL (ref 0–0.3)
BILIRUB SERPL-MCNC: 0.3 MG/DL
BUN SERPL-MCNC: 16.4 MG/DL (ref 6–20)
CALCIUM SERPL-MCNC: 8.1 MG/DL (ref 8.6–10)
CHLORIDE SERPL-SCNC: 110 MMOL/L (ref 98–107)
CREAT SERPL-MCNC: 0.99 MG/DL (ref 0.51–0.95)
DEPRECATED HCO3 PLAS-SCNC: 21 MMOL/L (ref 22–29)
EOSINOPHIL # BLD AUTO: 0.2 10E3/UL (ref 0–0.7)
EOSINOPHIL NFR BLD AUTO: 5 %
ERYTHROCYTE [DISTWIDTH] IN BLOOD BY AUTOMATED COUNT: 13.9 % (ref 10–15)
FERRITIN SERPL-MCNC: 184 NG/ML (ref 11–328)
GFR SERPL CREATININE-BSD FRML MDRD: 69 ML/MIN/1.73M2
GLUCOSE SERPL-MCNC: 242 MG/DL (ref 70–99)
HCT VFR BLD AUTO: 36.8 % (ref 35–47)
HGB BLD-MCNC: 12.2 G/DL (ref 11.7–15.7)
IMM GRANULOCYTES # BLD: 0 10E3/UL
IMM GRANULOCYTES NFR BLD: 0 %
IRON BINDING CAPACITY (ROCHE): 166 UG/DL (ref 240–430)
IRON SATN MFR SERPL: 43 % (ref 15–46)
IRON SERPL-MCNC: 72 UG/DL (ref 37–145)
LYMPHOCYTES # BLD AUTO: 1.3 10E3/UL (ref 0.8–5.3)
LYMPHOCYTES NFR BLD AUTO: 30 %
MAGNESIUM SERPL-MCNC: 1.6 MG/DL (ref 1.7–2.3)
MCH RBC QN AUTO: 31.1 PG (ref 26.5–33)
MCHC RBC AUTO-ENTMCNC: 33.2 G/DL (ref 31.5–36.5)
MCV RBC AUTO: 94 FL (ref 78–100)
MONOCYTES # BLD AUTO: 0.3 10E3/UL (ref 0–1.3)
MONOCYTES NFR BLD AUTO: 7 %
NEUTROPHILS # BLD AUTO: 2.5 10E3/UL (ref 1.6–8.3)
NEUTROPHILS NFR BLD AUTO: 57 %
NRBC # BLD AUTO: 0 10E3/UL
NRBC BLD AUTO-RTO: 0 /100
PHOSPHATE SERPL-MCNC: 3.8 MG/DL (ref 2.5–4.5)
PLATELET # BLD AUTO: 149 10E3/UL (ref 150–450)
POTASSIUM SERPL-SCNC: 4.4 MMOL/L (ref 3.4–5.3)
PROT SERPL-MCNC: 6.2 G/DL (ref 6.4–8.3)
RBC # BLD AUTO: 3.92 10E6/UL (ref 3.8–5.2)
SODIUM SERPL-SCNC: 138 MMOL/L (ref 136–145)
WBC # BLD AUTO: 4.4 10E3/UL (ref 4–11)

## 2023-09-05 PROCEDURE — 36415 COLL VENOUS BLD VENIPUNCTURE: CPT

## 2023-09-05 PROCEDURE — 84100 ASSAY OF PHOSPHORUS: CPT

## 2023-09-05 PROCEDURE — 83735 ASSAY OF MAGNESIUM: CPT

## 2023-09-05 PROCEDURE — 80053 COMPREHEN METABOLIC PANEL: CPT

## 2023-09-05 PROCEDURE — 85025 COMPLETE CBC W/AUTO DIFF WBC: CPT

## 2023-09-05 PROCEDURE — 83540 ASSAY OF IRON: CPT

## 2023-09-05 PROCEDURE — 83550 IRON BINDING TEST: CPT

## 2023-09-05 PROCEDURE — 80197 ASSAY OF TACROLIMUS: CPT

## 2023-09-05 PROCEDURE — 82728 ASSAY OF FERRITIN: CPT

## 2023-09-05 PROCEDURE — 82248 BILIRUBIN DIRECT: CPT

## 2023-09-06 LAB
TACROLIMUS BLD-MCNC: 6.5 UG/L (ref 5–15)
TME LAST DOSE: NORMAL H
TME LAST DOSE: NORMAL H

## 2023-09-08 ENCOUNTER — TELEPHONE (OUTPATIENT)
Dept: TRANSPLANT | Facility: CLINIC | Age: 53
End: 2023-09-08
Payer: COMMERCIAL

## 2023-09-08 ENCOUNTER — OFFICE VISIT (OUTPATIENT)
Dept: GASTROENTEROLOGY | Facility: CLINIC | Age: 53
End: 2023-09-08
Attending: INTERNAL MEDICINE
Payer: COMMERCIAL

## 2023-09-08 VITALS — HEART RATE: 57 BPM | OXYGEN SATURATION: 100 % | SYSTOLIC BLOOD PRESSURE: 138 MMHG | DIASTOLIC BLOOD PRESSURE: 91 MMHG

## 2023-09-08 DIAGNOSIS — R11.0 NAUSEA: ICD-10-CM

## 2023-09-08 DIAGNOSIS — Z94.4 LIVER REPLACED BY TRANSPLANT (H): Primary | ICD-10-CM

## 2023-09-08 PROCEDURE — G0463 HOSPITAL OUTPT CLINIC VISIT: HCPCS | Performed by: INTERNAL MEDICINE

## 2023-09-08 PROCEDURE — 99215 OFFICE O/P EST HI 40 MIN: CPT | Performed by: INTERNAL MEDICINE

## 2023-09-08 RX ORDER — METOCLOPRAMIDE 5 MG/1
5 TABLET ORAL 3 TIMES DAILY PRN
Qty: 100 TABLET | Refills: 3 | Status: SHIPPED | OUTPATIENT
Start: 2023-09-08

## 2023-09-08 ASSESSMENT — PAIN SCALES - GENERAL: PAINLEVEL: NO PAIN (0)

## 2023-09-08 NOTE — PROGRESS NOTES
Solid Organ Transplant Hepatology Follow-Up Visit:     HISTORY OF PRESENT ILLNESS:   I had the pleasure of seeing Susan Puckett for followup in the Liver Clinic at the [facility] on September 8, 2023. Ms. 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     Overall, she is doing fairly well.  She denies any abdominal pain, itching or skin rash.  She does have fatigue. She denies any increased abdominal girth.  She has very mild lower extremity edema.     She denies any fevers or chills.  She denies any cough, but does have shortness of breath.  She does have nausea without vomiting.  She is using Zofran and Reglan.  She does have occasional increased loose stools in the morning.  Her appetite has been good, and her weight is stable.     Otherwise, there have been no other new events since she was last seen.    Medications:   Current Outpatient Medications   Medication     magnesium glycinate lysinate chelate (DOCTOR'S BEST) 100 MG TABS tablet    acetaminophen (TYLENOL) 325 MG tablet    acyclovir (ZOVIRAX) 400 MG tablet    aspirin (ASA) 81 MG chewable tablet    calcium carbonate-vitamin D (OSCAL) 500-5 MG-MCG tablet    Continuous Blood Gluc  (DEXCOM G6 ) LUNA    Continuous Blood Gluc Sensor (DEXCOM G6 SENSOR) MISC    Continuous Blood Gluc Transmit (DEXCOM G6 TRANSMITTER) MISC    cyanocobalamin (VITAMIN B-12) 1000 MCG tablet    diazepam (VALIUM) 5 MG tablet    docusate sodium (DSS) 100 MG capsule    escitalopram (LEXAPRO) 20 MG tablet    fludrocortisone (FLORINEF) 0.1 MG tablet    folic acid (FOLVITE) 1 MG tablet    gabapentin (NEURONTIN) 100 MG capsule    hydrOXYzine (ATARAX) 25 MG tablet    levothyroxine (SYNTHROID/LEVOTHROID) 125 MCG tablet    LORazepam (ATIVAN) 0.5 MG tablet    melatonin 5 MG tablet    ondansetron (ZOFRAN ODT) 8 MG ODT tab    SUMAtriptan (IMITREX) 20 MG/ACT nasal  spray    tacrolimus (GENERIC EQUIVALENT) 1 MG capsule    topiramate (TOPAMAX) 25 MG tablet    traZODone (DESYREL) 50 MG tablet    glipiZIDE (GLUCOTROL XL) 5 MG 24 hr tablet    tacrolimus (GENERIC EQUIVALENT) 0.5 MG capsule    tacrolimus (GENERIC EQUIVALENT) 5 MG capsule     No current facility-administered medications for this visit.      Vitals:   BP (!) 138/91 (BP Location: Right arm, Patient Position: Sitting, Cuff Size: Adult Regular)   Pulse 57   SpO2 100%     Physical Exam:   In general she looks quite well. HEENT exam shows no scleral icterus or temporal muscle wasting. Chest is clear. Abdominal exam shows no increase in girth. No masses or tenderness to palpation are present. Liver is 10 cm in span without left lobe enlargement. No spleen tip is palpable. Here incision is intact.  Extremity exam shows no edema. Skin exam shows no suspicious lesions. Neurologic exam is nonfocal..     Labs:   Lab Results   Component Value Date     09/05/2023    POTASSIUM 4.4 09/05/2023    CHLORIDE 110 (H) 09/05/2023    ANIONGAP 7 09/05/2023    CO2 21 (L) 09/05/2023    BUN 16.4 09/05/2023    CR 0.99 (H) 09/05/2023    GFRESTIMATED 69 09/05/2023    MAURI 8.1 (L) 09/05/2023      Lab Results   Component Value Date    WBC 4.4 09/05/2023    HGB 12.2 09/05/2023    HCT 36.8 09/05/2023    MCV 94 09/05/2023    MCH 31.1 09/05/2023    MCHC 33.2 09/05/2023    RDW 13.9 09/05/2023     (L) 09/05/2023     Lab Results   Component Value Date    ALBUMIN 3.6 09/05/2023    ALKPHOS 112 (H) 09/05/2023    AST 18 09/05/2023     Lab Results   Component Value Date    INR 1.32 (H) 11/04/2022       Recent Labs   Lab Test 09/05/23  0833 08/11/23  0948 07/10/23  1035 06/06/23  0905 06/05/23  1058 05/26/23  1228 05/08/23  0819 04/11/23  1022 04/10/23  0901 03/27/23  0912   ALKPHOS 112* 123* 107* 118 114 104 99 101 97 87   ALT <5 10 10 21 24 12 30 28 23 15   AST 18 23 25 24 27 30 36 34 22 21        Imaging:   No images are attached to the  encounter.     Assessment/Plan:   IMPRESSION:   My impression is that Ms. Puckett is doing fairly well status post liver transplantation.  Her hemolytic anemia has completely resolved.  I am not what her nausea is related to.  The good news is it is not affecting her weight.     She has seen the dermatologist already and is otherwise up to date on vaccines as well.  I have recommended she receive a COVID booster, and a flu and RSV vaccine this Fall.  My plan will be to see her back in the clinic in 3 months.       I did spend a total of 40 minutes (on the date of the encounter), including 30 minutes of face-to-face clinic time including counseling. The rest of the time was spent in documentation and review of records.    Thank you very much for allowing me to participate in the care of this patient.  If you have any questions regarding my recommendations, please do not hesitate to contact me.         Raphael Cook MD      Professor of Medicine  [employer]      Executive Medical Director, Solid Organ Transplant Program  [facility]

## 2023-09-08 NOTE — NURSING NOTE
Chief Complaint   Patient presents with    RECHECK     BP (!) 149/87 (BP Location: Right arm, Patient Position: Sitting, Cuff Size: Adult Regular)   Pulse 57   SpO2 100%

## 2023-09-08 NOTE — LETTER
9/8/2023         RE: Susan Puckett  [address] [facility] [address]        Dear Colleague,    Thank you for referring your patient, Susan Puckett, to the [facility] [place]. Please see a copy of my visit note below.    Solid Organ Transplant Hepatology Follow-Up Visit:     HISTORY OF PRESENT ILLNESS:   I had the pleasure of seeing Susan Puckett for followup in the Liver Clinic at the [facility] on September 8, 2023. Ms. 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     Overall, she is doing fairly well.  She denies any abdominal pain, itching or skin rash.  She does have fatigue. She denies any increased abdominal girth.  She has very mild lower extremity edema.     She denies any fevers or chills.  She denies any cough, but does have shortness of breath.  She does have nausea without vomiting.  She is using Zofran and Reglan.  She does have occasional increased loose stools in the morning.  Her appetite has been good, and her weight is stable.     Otherwise, there have been no other new events since she was last seen.    Medications:   Current Outpatient Medications   Medication     magnesium glycinate lysinate chelate (DOCTOR'S BEST) 100 MG TABS tablet    acetaminophen (TYLENOL) 325 MG tablet    acyclovir (ZOVIRAX) 400 MG tablet    aspirin (ASA) 81 MG chewable tablet    calcium carbonate-vitamin D (OSCAL) 500-5 MG-MCG tablet    Continuous Blood Gluc  (DEXCOM G6 ) LUNA    Continuous Blood Gluc Sensor (DEXCOM G6 SENSOR) MISC    Continuous Blood Gluc Transmit (DEXCOM G6 TRANSMITTER) MISC    cyanocobalamin (VITAMIN B-12) 1000 MCG tablet    diazepam (VALIUM) 5 MG tablet    docusate sodium (DSS) 100 MG capsule    escitalopram (LEXAPRO) 20 MG tablet    fludrocortisone (FLORINEF) 0.1 MG tablet    folic acid (FOLVITE) 1 MG tablet    gabapentin  (NEURONTIN) 100 MG capsule    hydrOXYzine (ATARAX) 25 MG tablet    levothyroxine (SYNTHROID/LEVOTHROID) 125 MCG tablet    LORazepam (ATIVAN) 0.5 MG tablet    melatonin 5 MG tablet    ondansetron (ZOFRAN ODT) 8 MG ODT tab    SUMAtriptan (IMITREX) 20 MG/ACT nasal spray    tacrolimus (GENERIC EQUIVALENT) 1 MG capsule    topiramate (TOPAMAX) 25 MG tablet    traZODone (DESYREL) 50 MG tablet    glipiZIDE (GLUCOTROL XL) 5 MG 24 hr tablet    tacrolimus (GENERIC EQUIVALENT) 0.5 MG capsule    tacrolimus (GENERIC EQUIVALENT) 5 MG capsule     No current facility-administered medications for this visit.      Vitals:   BP (!) 138/91 (BP Location: Right arm, Patient Position: Sitting, Cuff Size: Adult Regular)   Pulse 57   SpO2 100%     Physical Exam:   In general she looks quite well. HEENT exam shows no scleral icterus or temporal muscle wasting. Chest is clear. Abdominal exam shows no increase in girth. No masses or tenderness to palpation are present. Liver is 10 cm in span without left lobe enlargement. No spleen tip is palpable. Here incision is intact.  Extremity exam shows no edema. Skin exam shows no suspicious lesions. Neurologic exam is nonfocal..     Labs:   Lab Results   Component Value Date     09/05/2023    POTASSIUM 4.4 09/05/2023    CHLORIDE 110 (H) 09/05/2023    ANIONGAP 7 09/05/2023    CO2 21 (L) 09/05/2023    BUN 16.4 09/05/2023    CR 0.99 (H) 09/05/2023    GFRESTIMATED 69 09/05/2023    MAURI 8.1 (L) 09/05/2023      Lab Results   Component Value Date    WBC 4.4 09/05/2023    HGB 12.2 09/05/2023    HCT 36.8 09/05/2023    MCV 94 09/05/2023    MCH 31.1 09/05/2023    MCHC 33.2 09/05/2023    RDW 13.9 09/05/2023     (L) 09/05/2023     Lab Results   Component Value Date    ALBUMIN 3.6 09/05/2023    ALKPHOS 112 (H) 09/05/2023    AST 18 09/05/2023     Lab Results   Component Value Date    INR 1.32 (H) 11/04/2022       Recent Labs   Lab Test 09/05/23  0833 08/11/23  0948 07/10/23  1035 06/06/23  0905  06/05/23  1058 05/26/23  1228 05/08/23  0819 04/11/23  1022 04/10/23  0901 03/27/23  0912   ALKPHOS 112* 123* 107* 118 114 104 99 101 97 87   ALT <5 10 10 21 24 12 30 28 23 15   AST 18 23 25 24 27 30 36 34 22 21        Imaging:   No images are attached to the encounter.     Assessment/Plan:   IMPRESSION:   My impression is that Ms. Puckett is doing fairly well status post liver transplantation.  Her hemolytic anemia has completely resolved.  I am not what her nausea is related to.  The good news is it is not affecting her weight.     She has seen the dermatologist already and is otherwise up to date on vaccines as well.  I have recommended she receive a COVID booster, and a flu and RSV vaccine this Fall.  My plan will be to see her back in the clinic in 3 months.       I did spend a total of 40 minutes (on the date of the encounter), including 30 minutes of face-to-face clinic time including counseling. The rest of the time was spent in documentation and review of records.    Thank you very much for allowing me to participate in the care of this patient.  If you have any questions regarding my recommendations, please do not hesitate to contact me.       Raphael Cook MD    Professor of Medicine  [employer]    Executive Medical Director, Solid Organ Transplant Program  [facility]

## 2023-09-08 NOTE — TELEPHONE ENCOUNTER
Susan got Botox for migraines on Tuesday. Going to get nerve block in leg and steroid injection in shoulder. Will discuss with Dr. Cook at her appointment today.

## 2023-10-02 ENCOUNTER — LAB (OUTPATIENT)
Dept: LAB | Facility: CLINIC | Age: 53
End: 2023-10-02
Attending: TRANSPLANT SURGERY
Payer: COMMERCIAL

## 2023-10-02 DIAGNOSIS — Z94.4 LIVER TRANSPLANTED (H): ICD-10-CM

## 2023-10-02 LAB
ALBUMIN SERPL BCG-MCNC: 3.5 G/DL (ref 3.5–5.2)
ALP SERPL-CCNC: 111 U/L (ref 35–104)
ALT SERPL W P-5'-P-CCNC: 17 U/L (ref 0–50)
ANION GAP SERPL CALCULATED.3IONS-SCNC: 9 MMOL/L (ref 7–15)
AST SERPL W P-5'-P-CCNC: 20 U/L (ref 0–45)
BASOPHILS # BLD AUTO: 0 10E3/UL (ref 0–0.2)
BASOPHILS NFR BLD AUTO: 0 %
BILIRUB DIRECT SERPL-MCNC: <0.2 MG/DL (ref 0–0.3)
BILIRUB SERPL-MCNC: 0.4 MG/DL
BUN SERPL-MCNC: 15.2 MG/DL (ref 6–20)
CALCIUM SERPL-MCNC: 7.7 MG/DL (ref 8.6–10)
CHLORIDE SERPL-SCNC: 107 MMOL/L (ref 98–107)
CREAT SERPL-MCNC: 1.19 MG/DL (ref 0.51–0.95)
DEPRECATED HCO3 PLAS-SCNC: 22 MMOL/L (ref 22–29)
EGFRCR SERPLBLD CKD-EPI 2021: 55 ML/MIN/1.73M2
EOSINOPHIL # BLD AUTO: 0.2 10E3/UL (ref 0–0.7)
EOSINOPHIL NFR BLD AUTO: 3 %
ERYTHROCYTE [DISTWIDTH] IN BLOOD BY AUTOMATED COUNT: 12.8 % (ref 10–15)
GLUCOSE SERPL-MCNC: 305 MG/DL (ref 70–99)
HCT VFR BLD AUTO: 34 % (ref 35–47)
HGB BLD-MCNC: 11.2 G/DL (ref 11.7–15.7)
IMM GRANULOCYTES # BLD: 0 10E3/UL
IMM GRANULOCYTES NFR BLD: 0 %
LYMPHOCYTES # BLD AUTO: 0.8 10E3/UL (ref 0.8–5.3)
LYMPHOCYTES NFR BLD AUTO: 14 %
MAGNESIUM SERPL-MCNC: 1.6 MG/DL (ref 1.7–2.3)
MCH RBC QN AUTO: 31 PG (ref 26.5–33)
MCHC RBC AUTO-ENTMCNC: 32.9 G/DL (ref 31.5–36.5)
MCV RBC AUTO: 94 FL (ref 78–100)
MONOCYTES # BLD AUTO: 0.3 10E3/UL (ref 0–1.3)
MONOCYTES NFR BLD AUTO: 6 %
NEUTROPHILS # BLD AUTO: 4 10E3/UL (ref 1.6–8.3)
NEUTROPHILS NFR BLD AUTO: 77 %
NRBC # BLD AUTO: 0 10E3/UL
NRBC BLD AUTO-RTO: 0 /100
PHOSPHATE SERPL-MCNC: 3 MG/DL (ref 2.5–4.5)
PLATELET # BLD AUTO: 150 10E3/UL (ref 150–450)
POTASSIUM SERPL-SCNC: 3.6 MMOL/L (ref 3.4–5.3)
PROT SERPL-MCNC: 6 G/DL (ref 6.4–8.3)
RBC # BLD AUTO: 3.61 10E6/UL (ref 3.8–5.2)
SODIUM SERPL-SCNC: 138 MMOL/L (ref 135–145)
TACROLIMUS BLD-MCNC: 6.9 UG/L (ref 5–15)
TME LAST DOSE: NORMAL H
TME LAST DOSE: NORMAL H
WBC # BLD AUTO: 5.2 10E3/UL (ref 4–11)

## 2023-10-02 PROCEDURE — 85025 COMPLETE CBC W/AUTO DIFF WBC: CPT

## 2023-10-02 PROCEDURE — 36415 COLL VENOUS BLD VENIPUNCTURE: CPT

## 2023-10-02 PROCEDURE — 83735 ASSAY OF MAGNESIUM: CPT

## 2023-10-02 PROCEDURE — 84100 ASSAY OF PHOSPHORUS: CPT

## 2023-10-02 PROCEDURE — 82248 BILIRUBIN DIRECT: CPT

## 2023-10-02 PROCEDURE — 80053 COMPREHEN METABOLIC PANEL: CPT

## 2023-10-02 PROCEDURE — 80197 ASSAY OF TACROLIMUS: CPT

## 2023-10-05 ENCOUNTER — TELEPHONE (OUTPATIENT)
Dept: TRANSPLANT | Facility: CLINIC | Age: 53
End: 2023-10-05
Payer: COMMERCIAL

## 2023-10-05 NOTE — TELEPHONE ENCOUNTER
Migraines since last Monday, migraine medications have been switched around recently. Susan is wondering if magnesium has anything to do with her migraines. Blood sugars are really high, despite not eating much. She was recently taken off metformin due to nausea and put on ozempic. Low appetite.     Unsure about connection between magnesium and migraines, encouraged Susan to reach out to her migraine provider again. She had already sent [employer] messages, but will call office now. Same with her endocrinologist.

## 2023-10-05 NOTE — TELEPHONE ENCOUNTER
Call to Susan. The medication is tizanidine and according to Up To Date, can cause hepatotoxicity. Let Susan know that it probably should not be taken. Her doctor wanted her to check with liver team first as this is a known issue. Susan will let her migraine doctor know.

## 2023-10-25 NOTE — PROGRESS NOTES
[facility] Hematology Consultation    Outpatient Visit Note:    Patient: Susan Puckett  MRN: [id number]  : 1970  RHONDA: May 22, 2023    History of Present Illness:  Susan Puckett is a 52 year old woman with a history of CUETO s/p liver transplant (2022, on tacrolimus), gastric bypass surgery (), referred for evaluation of hemolytic anemia.    Most recent CBC in early May with a white count of 2300/mcL (ANC 1300/mcL), hemoglobin 9.4 g/dL with an MCV of 104, and a platelet count of 133,000/mcL.  Adequate reticulocytosis for degree of anemia.  Normal bilirubin levels.  B12 folate and iron levels normal.  Haptoglobin less than 3 mg/dL.  LDH elevated at 306 units/L.  PIERCE negative.  Peripheral smear with moderate macrocytic anemia with increased erythrocyte regeneration and rare bite cells, as well as moderate leukocytopenia with lymphocytopenia.  There was a suggestion of Humza body hemolysis in the context of dapsone therapy.    Dapsone was stopped beginning of May.  She presents today at the recommendation of her outside hematologist.  She notes fatigue.  No fevers or night sweats, no unexpected weight loss, no rashes or adenopathy.  No bleeding or bruising issues.  Medications as listed below.    2023: Received iron dextran on Raissa 15.  Hemoglobin currently within normal limits.  Ferritin increased from 38 ng/mL to 184.    2023: Received iron dextran 6/15. Ferritin 184 in September. Continues to have extreme fatigue.  No fevers, night sweats, weight loss, rashes, adenopathy, bleeding or bruising.  However she does note very dark stools.  No hematochezia.      Medications:  Current Outpatient Medications   Medication Sig Dispense Refill     magnesium glycinate lysinate chelate (DOCTOR'S BEST) 100 MG TABS tablet Take 2 tablets (200 mg) by mouth 2 times daily 540 tablet 3    acetaminophen (TYLENOL) 325 MG tablet Take 3 tablets (975 mg) by mouth every 8 hours as  needed for mild pain 100 tablet 0    acyclovir (ZOVIRAX) 400 MG tablet TAKE 1 TABLET BY MOUTH THREE TIMES A DAY FOR 5 DAYS.      aspirin (ASA) 81 MG chewable tablet Take 1 tablet (81 mg) by mouth daily 90 tablet 3    calcium carbonate-vitamin D (OSCAL) 500-5 MG-MCG tablet Take 1 tablet by mouth 2 times daily      Continuous Blood Gluc  (DEXCOM G6 ) LUNA       Continuous Blood Gluc Sensor (DEXCOM G6 SENSOR) MISC       Continuous Blood Gluc Transmit (DEXCOM G6 TRANSMITTER) MISC       cyanocobalamin (VITAMIN B-12) 1000 MCG tablet Take 1,000 mcg by mouth every 7 days on Wednesdays      diazepam (VALIUM) 5 MG tablet TAKE 1 TABLET BY MOUTH ONCE FOR 1 DOSE. *BRING TO MRI APPT AND WAIT FOR FURTHER GUIDANCE*      docusate sodium (DSS) 100 MG capsule Take 100 mg by mouth      escitalopram (LEXAPRO) 20 MG tablet Take 30 mg by mouth daily      fludrocortisone (FLORINEF) 0.1 MG tablet Take 1 tablet (0.1 mg) by mouth daily 90 tablet 3    folic acid (FOLVITE) 1 MG tablet Take 1 mg by mouth every morning      gabapentin (NEURONTIN) 100 MG capsule Take 300 mg by mouth 3 times daily      hydrOXYzine (ATARAX) 25 MG tablet Take 25 mg by mouth 3 times daily as needed for anxiety      levothyroxine (SYNTHROID/LEVOTHROID) 125 MCG tablet Take 125 mcg by mouth daily      LORazepam (ATIVAN) 0.5 MG tablet Take 0.5 mg by mouth nightly as needed For anxiety      melatonin 5 MG tablet Take 5 mg by mouth At Bedtime      metoclopramide (REGLAN) 5 MG tablet Take 1 tablet (5 mg) by mouth 3 times daily as needed 100 tablet 3    ondansetron (ZOFRAN ODT) 8 MG ODT tab Take 1 tablet (8 mg) by mouth every 8 hours as needed for nausea 30 tablet 0    SUMAtriptan (IMITREX) 20 MG/ACT nasal spray Spray 1 spray in nostril as needed for migraine      tacrolimus (GENERIC EQUIVALENT) 0.5 MG capsule Take 1 capsule (0.5 mg) by mouth as needed (For dose changes) Total dose 5 mg BID (Patient not taking: Reported on 9/8/2023) 90 capsule 3    tacrolimus  (GENERIC EQUIVALENT) 1 MG capsule Take 3 capsules (3 mg) by mouth every morning AND 4 capsules (4 mg) every evening. Total dose 3 mg am, 4 mg pm 630 capsule 11    tacrolimus (GENERIC EQUIVALENT) 5 MG capsule Take 1 capsule (5 mg) by mouth every evening Total dose 4mg am, 5mg pm (Patient not taking: Reported on 9/8/2023) 90 capsule 3    topiramate (TOPAMAX) 25 MG tablet Take 100 mg by mouth At Bedtime      traZODone (DESYREL) 50 MG tablet Take 25 mg by mouth At Bedtime          Allergies:  Allergies   Allergen Reactions    Methylprednisolone Hives     Per patient, reaction occurred in 1999 or earlier. Broke out in round, flat hives in neck and chest area. No shortness of breath or swelling. Unknown if reaction occurred immediately after administration.     Adhesive Tape Itching    Oxycodone      slurring    Droperidol Anxiety    Nsaids Other (See Comments)     GI bleed.    Prednisone Anxiety, Hives and Rash     Per patient she has tolerated this since       Reason for Consultation: Anemia.    Assessment:  Susan Puckett is a 52 year old woman with a history of liver transplant on tacrolimus and gastric bypass surgery referred for evaluation of anemia.    Initially I felt that she had hemolysis related to dapsone therapy, which has since been changed and there was improvement.  She also received IV iron for borderline iron deficiency, and while her iron stores have improved as has the anemia, the symptoms have not.  She now again has mild anemia, and notes significant fatigue out of proportion to the anemia.  Possible explanations include bleeding (no melena), nutritional deficiency (such as B12 or folate given her bariatric surgery status), ongoing hemolysis due to some other cause, marrow suppression related to her immunosuppression or other medications, or erythropoietin deficiency.  Her renal function is not quite low enough to suggest that she should have erythropoietin deficiency, but I will assess that today.  I  will also discuss her case with her liver transplant physician, Dr. Cook, to see if the borderline renal function needs any further management from his perspective, as I assume this is related to something with the transplant.    Plan:  - Labs: CBC, CMP, iron studies, B12, folate, EPO level, reticulocyte's, haptoglobin, LDH  - Message sent to Dr. Cook about renal function  - Follow-up in at most 6 months    Jacob Cogan, MD  Hematology    30 minutes spent by me on the date of the encounter doing chart review, history and exam, documentation and further activities per the note

## 2023-10-27 ENCOUNTER — OFFICE VISIT (OUTPATIENT)
Dept: HEMATOLOGY | Facility: CLINIC | Age: 53
End: 2023-10-27
Attending: STUDENT IN AN ORGANIZED HEALTH CARE EDUCATION/TRAINING PROGRAM
Payer: COMMERCIAL

## 2023-10-27 VITALS
HEART RATE: 67 BPM | WEIGHT: 149.1 LBS | TEMPERATURE: 98 F | DIASTOLIC BLOOD PRESSURE: 86 MMHG | OXYGEN SATURATION: 99 % | SYSTOLIC BLOOD PRESSURE: 125 MMHG | BODY MASS INDEX: 23.82 KG/M2

## 2023-10-27 DIAGNOSIS — D50.0 IRON DEFICIENCY ANEMIA DUE TO CHRONIC BLOOD LOSS: Primary | ICD-10-CM

## 2023-10-27 DIAGNOSIS — D59.2: ICD-10-CM

## 2023-10-27 PROCEDURE — G0463 HOSPITAL OUTPT CLINIC VISIT: HCPCS | Performed by: STUDENT IN AN ORGANIZED HEALTH CARE EDUCATION/TRAINING PROGRAM

## 2023-10-27 PROCEDURE — 99214 OFFICE O/P EST MOD 30 MIN: CPT | Performed by: STUDENT IN AN ORGANIZED HEALTH CARE EDUCATION/TRAINING PROGRAM

## 2023-11-06 ENCOUNTER — LAB (OUTPATIENT)
Dept: LAB | Facility: CLINIC | Age: 53
End: 2023-11-06
Attending: TRANSPLANT SURGERY
Payer: COMMERCIAL

## 2023-11-06 DIAGNOSIS — D59.2: ICD-10-CM

## 2023-11-06 DIAGNOSIS — Z94.4 LIVER TRANSPLANTED (H): ICD-10-CM

## 2023-11-06 DIAGNOSIS — D50.0 IRON DEFICIENCY ANEMIA DUE TO CHRONIC BLOOD LOSS: ICD-10-CM

## 2023-11-06 LAB
ALBUMIN SERPL BCG-MCNC: 3.1 G/DL (ref 3.5–5.2)
ALP SERPL-CCNC: 136 U/L (ref 35–104)
ALT SERPL W P-5'-P-CCNC: 38 U/L (ref 0–50)
ANION GAP SERPL CALCULATED.3IONS-SCNC: 8 MMOL/L (ref 7–15)
AST SERPL W P-5'-P-CCNC: 33 U/L (ref 0–45)
BASOPHILS # BLD AUTO: 0 10E3/UL (ref 0–0.2)
BASOPHILS NFR BLD AUTO: 1 %
BILIRUB DIRECT SERPL-MCNC: <0.2 MG/DL (ref 0–0.3)
BILIRUB SERPL-MCNC: 0.2 MG/DL
BUN SERPL-MCNC: 20.9 MG/DL (ref 6–20)
CALCIUM SERPL-MCNC: 7.9 MG/DL (ref 8.6–10)
CHLORIDE SERPL-SCNC: 110 MMOL/L (ref 98–107)
CREAT SERPL-MCNC: 1.55 MG/DL (ref 0.51–0.95)
DEPRECATED HCO3 PLAS-SCNC: 23 MMOL/L (ref 22–29)
EGFRCR SERPLBLD CKD-EPI 2021: 40 ML/MIN/1.73M2
EOSINOPHIL # BLD AUTO: 0.2 10E3/UL (ref 0–0.7)
EOSINOPHIL NFR BLD AUTO: 4 %
ERYTHROCYTE [DISTWIDTH] IN BLOOD BY AUTOMATED COUNT: 13.3 % (ref 10–15)
FERRITIN SERPL-MCNC: 309 NG/ML (ref 11–328)
FOLATE SERPL-MCNC: >40 NG/ML (ref 4.6–34.8)
GLUCOSE SERPL-MCNC: 132 MG/DL (ref 70–99)
HCT VFR BLD AUTO: 35.4 % (ref 35–47)
HGB BLD-MCNC: 11.2 G/DL (ref 11.7–15.7)
IMM GRANULOCYTES # BLD: 0 10E3/UL
IMM GRANULOCYTES NFR BLD: 0 %
IRON BINDING CAPACITY (ROCHE): 141 UG/DL (ref 240–430)
IRON SATN MFR SERPL: 56 % (ref 15–46)
IRON SERPL-MCNC: 79 UG/DL (ref 37–145)
LDH SERPL L TO P-CCNC: 192 U/L (ref 0–250)
LYMPHOCYTES # BLD AUTO: 1.6 10E3/UL (ref 0.8–5.3)
LYMPHOCYTES NFR BLD AUTO: 34 %
MCH RBC QN AUTO: 31.2 PG (ref 26.5–33)
MCHC RBC AUTO-ENTMCNC: 31.6 G/DL (ref 31.5–36.5)
MCV RBC AUTO: 99 FL (ref 78–100)
MONOCYTES # BLD AUTO: 0.3 10E3/UL (ref 0–1.3)
MONOCYTES NFR BLD AUTO: 6 %
NEUTROPHILS # BLD AUTO: 2.6 10E3/UL (ref 1.6–8.3)
NEUTROPHILS NFR BLD AUTO: 55 %
NRBC # BLD AUTO: 0 10E3/UL
NRBC BLD AUTO-RTO: 0 /100
PHOSPHATE SERPL-MCNC: 4.8 MG/DL (ref 2.5–4.5)
PLATELET # BLD AUTO: 206 10E3/UL (ref 150–450)
POTASSIUM SERPL-SCNC: 4.5 MMOL/L (ref 3.4–5.3)
PROT SERPL-MCNC: 6 G/DL (ref 6.4–8.3)
RBC # BLD AUTO: 3.59 10E6/UL (ref 3.8–5.2)
RETICS # AUTO: 0.07 10E6/UL (ref 0.03–0.1)
RETICS/RBC NFR AUTO: 2 % (ref 0.5–2)
SODIUM SERPL-SCNC: 141 MMOL/L (ref 135–145)
TACROLIMUS BLD-MCNC: 6 UG/L (ref 5–15)
TME LAST DOSE: NORMAL H
TME LAST DOSE: NORMAL H
VIT B12 SERPL-MCNC: 622 PG/ML (ref 232–1245)
WBC # BLD AUTO: 4.7 10E3/UL (ref 4–11)

## 2023-11-06 PROCEDURE — 84100 ASSAY OF PHOSPHORUS: CPT

## 2023-11-06 PROCEDURE — 85025 COMPLETE CBC W/AUTO DIFF WBC: CPT

## 2023-11-06 PROCEDURE — 83615 LACTATE (LD) (LDH) ENZYME: CPT

## 2023-11-06 PROCEDURE — 36415 COLL VENOUS BLD VENIPUNCTURE: CPT

## 2023-11-06 PROCEDURE — 83550 IRON BINDING TEST: CPT

## 2023-11-06 PROCEDURE — 82248 BILIRUBIN DIRECT: CPT

## 2023-11-06 PROCEDURE — 80197 ASSAY OF TACROLIMUS: CPT

## 2023-11-06 PROCEDURE — 83010 ASSAY OF HAPTOGLOBIN QUANT: CPT

## 2023-11-06 PROCEDURE — 82607 VITAMIN B-12: CPT

## 2023-11-06 PROCEDURE — 82746 ASSAY OF FOLIC ACID SERUM: CPT

## 2023-11-06 PROCEDURE — 82668 ASSAY OF ERYTHROPOIETIN: CPT | Mod: 90

## 2023-11-06 PROCEDURE — 83540 ASSAY OF IRON: CPT

## 2023-11-06 PROCEDURE — 80053 COMPREHEN METABOLIC PANEL: CPT

## 2023-11-06 PROCEDURE — 85045 AUTOMATED RETICULOCYTE COUNT: CPT

## 2023-11-06 PROCEDURE — 82728 ASSAY OF FERRITIN: CPT

## 2023-11-07 ENCOUNTER — TELEPHONE (OUTPATIENT)
Dept: TRANSPLANT | Facility: CLINIC | Age: 53
End: 2023-11-07
Payer: COMMERCIAL

## 2023-11-07 DIAGNOSIS — Z94.4 LIVER REPLACED BY TRANSPLANT (H): ICD-10-CM

## 2023-11-07 LAB
EPO SERPL-ACNC: 16 MU/ML
HAPTOGLOB SERPL-MCNC: 37 MG/DL (ref 32–197)

## 2023-11-07 RX ORDER — TACROLIMUS 1 MG/1
3 CAPSULE ORAL EVERY 12 HOURS
Qty: 540 CAPSULE | Refills: 3 | Status: SHIPPED | OUTPATIENT
Start: 2023-11-07 | End: 2023-12-05

## 2023-11-07 NOTE — TELEPHONE ENCOUNTER
ISSUE:   Tacrolimus IR level 6 on 11/6, goal 3-5, dose 3 mg AM, 4 mg PM.    PLAN:   Please call patient and confirm this was an accurate 12-hour trough. Verify Tacrolimus IR dose 3 mg AM, 4 mg PM. Confirm no new medications or illness. Confirm no missed doses. If accurate trough and accurate dose, decrease Tacrolimus IR dose to 3 mg BID and repeat labs in 1 month.     Can go to every 2-3 month labs after labs in December.     Megan Joseph RN      OUTCOME:   Spoke with patient, they confirm accurate trough level and current dose 3 mg am, 4 mg pm. Patient confirmed dose change to 3 mg BID and to repeat labs in 1 months. Orders sent to preferred pharmacy for dose change and lab for repeat labs. Patient voiced understanding of plan.     Ariana Liao LPN

## 2023-11-17 ENCOUNTER — TELEPHONE (OUTPATIENT)
Dept: PHARMACY | Facility: CLINIC | Age: 53
End: 2023-11-17
Payer: COMMERCIAL

## 2023-11-17 NOTE — TELEPHONE ENCOUNTER
Clinical Pharmacy Consult:                                                      Transplant Specific:   Date of Transplant: 11/2/2022  Type of Transplant: liver  First Transplant: yes  History of rejection: no    Immunosuppression Regimen   TAC 3mg qAM & 3mg qPM  Patient specific goal: 3-5  Most recent level: 6, date 11/6/2023  Immunosuppressant Levels:  Supratherapeutic  Pt adherent to lab draws: yes  Scr:   Lab Results   Component Value Date    CR 1.55 11/06/2023    CR 1.10 07/05/2021     Side effects: no side effects    Prophylactic Medications  Antibacterial:  Dapsone  Scheduled Discontinue Date: 6 months (stopped d/t hemolytic anemia)    Antifungal: Not needed thus far  Scheduled Discontinue Date: N/A    Antiviral: Valcyte    Scheduled Discontinue Date: 3 months (complete)(    Acid Reducer: N/A  Scheduled Reviewed Date: N/A    Thrombosis Prevention: Aspirin 81 mg PO daily  Scheduled Discontinue Date:  Managed by primary    Blood Pressure Management  Frequency of home Blood Pressure checks: not actively monitoring  Most recent home BP: N/A  Patient Blood pressure goal: <140/90  Patient blood pressure at goal:  clinic BP borderline at goal  Hospitalizations/ER visits since last assessment: 0      Med rec/DUR performed: yes  Med Rec Discrepancies: no    Phone call with Susan and her  for 1 year follow-up post liver txp. Patient reports she is doing really well. Denies any tremors, but reports she has been experiencing ongoing nausea. Her  manages her medications and is filling her pill boxes. Using Med Card and pill boxes with no missed doses over the past 6 months. Denies any fevers or outside visits to ER/urgent care.     BP: Patient is not checking her home BP regularly, she is agreeable to start monitoring at least weekly. Clinic BP borderline high 130s-140s/80s-90s.    No further questions/concerns at this time. Follow-up in 1 year.    Raphael Hodges, PharmD  PGY1 Pharmacy  Resident  [phone]

## 2023-11-22 DIAGNOSIS — Z13.220 LIPID SCREENING: ICD-10-CM

## 2023-11-22 DIAGNOSIS — Z94.4 LIVER REPLACED BY TRANSPLANT (H): Primary | ICD-10-CM

## 2023-12-04 ENCOUNTER — LAB (OUTPATIENT)
Dept: LAB | Facility: CLINIC | Age: 53
End: 2023-12-04
Attending: TRANSPLANT SURGERY
Payer: COMMERCIAL

## 2023-12-04 DIAGNOSIS — Z94.4 LIVER TRANSPLANTED (H): ICD-10-CM

## 2023-12-04 LAB
ALBUMIN SERPL BCG-MCNC: 3.3 G/DL (ref 3.5–5.2)
ALP SERPL-CCNC: 99 U/L (ref 40–150)
ALT SERPL W P-5'-P-CCNC: 19 U/L (ref 0–50)
ANION GAP SERPL CALCULATED.3IONS-SCNC: 7 MMOL/L (ref 7–15)
AST SERPL W P-5'-P-CCNC: 26 U/L (ref 0–45)
BILIRUB DIRECT SERPL-MCNC: <0.2 MG/DL (ref 0–0.3)
BILIRUB SERPL-MCNC: 0.2 MG/DL
BUN SERPL-MCNC: 12 MG/DL (ref 6–20)
CALCIUM SERPL-MCNC: 7.3 MG/DL (ref 8.6–10)
CHLORIDE SERPL-SCNC: 109 MMOL/L (ref 98–107)
CREAT SERPL-MCNC: 1.04 MG/DL (ref 0.51–0.95)
DEPRECATED HCO3 PLAS-SCNC: 25 MMOL/L (ref 22–29)
EGFRCR SERPLBLD CKD-EPI 2021: 65 ML/MIN/1.73M2
GLUCOSE SERPL-MCNC: 144 MG/DL (ref 70–99)
MAGNESIUM SERPL-MCNC: 1.6 MG/DL (ref 1.7–2.3)
PHOSPHATE SERPL-MCNC: 3.1 MG/DL (ref 2.5–4.5)
POTASSIUM SERPL-SCNC: 4.3 MMOL/L (ref 3.4–5.3)
PROT SERPL-MCNC: 5.9 G/DL (ref 6.4–8.3)
SODIUM SERPL-SCNC: 141 MMOL/L (ref 135–145)
TACROLIMUS BLD-MCNC: 7.7 UG/L (ref 5–15)
TME LAST DOSE: NORMAL H
TME LAST DOSE: NORMAL H

## 2023-12-04 PROCEDURE — 36415 COLL VENOUS BLD VENIPUNCTURE: CPT

## 2023-12-04 PROCEDURE — 84100 ASSAY OF PHOSPHORUS: CPT

## 2023-12-04 PROCEDURE — 83735 ASSAY OF MAGNESIUM: CPT

## 2023-12-04 PROCEDURE — 80197 ASSAY OF TACROLIMUS: CPT

## 2023-12-04 PROCEDURE — 80053 COMPREHEN METABOLIC PANEL: CPT

## 2023-12-04 PROCEDURE — 82248 BILIRUBIN DIRECT: CPT

## 2023-12-04 NOTE — PROGRESS NOTES
Susan sent a message asking why a CBC wasn't drawn today. She has CBCs ordered so not sure why it wasn't drawn by lab. Attempted to add on CBC, but unable to. Let Susan know that she can either go back to her lab and get drawn again or wait until her appt with Dr. Cook on 12/18 and get it drawn [place] [facility]

## 2023-12-05 ENCOUNTER — TELEPHONE (OUTPATIENT)
Dept: TRANSPLANT | Facility: CLINIC | Age: 53
End: 2023-12-05
Payer: COMMERCIAL

## 2023-12-05 DIAGNOSIS — Z94.4 LIVER REPLACED BY TRANSPLANT (H): ICD-10-CM

## 2023-12-05 RX ORDER — TACROLIMUS 1 MG/1
2 CAPSULE ORAL EVERY 12 HOURS
Qty: 540 CAPSULE | Refills: 3 | Status: SHIPPED | OUTPATIENT
Start: 2023-12-05 | End: 2024-08-22

## 2023-12-05 NOTE — TELEPHONE ENCOUNTER
ISSUE:   Tacrolimus IR level 7.7 on 12/4, goal 3-5, dose 3 mg BID.    PLAN:   Please call patient and confirm this was an accurate 12-hour trough. Verify Tacrolimus IR dose 3 mg BID. Confirm no new medications or illness. Confirm no missed doses. If accurate trough and accurate dose, decrease Tacrolimus IR dose to 2 mg BID and repeat labs in 1 month.    Megan Joseph RN      OUTCOME:   Spoke with patient, they confirm accurate trough level and current dose 3 mg BID. Patient confirmed dose change to 2 mg BID and to repeat labs in 1 months. Orders sent to preferred pharmacy for dose change and lab for repeat labs. Patient voiced understanding of plan.     Ariana Liao LPN

## 2023-12-08 DIAGNOSIS — Z94.4 LIVER REPLACED BY TRANSPLANT (H): ICD-10-CM

## 2023-12-08 RX ORDER — ASPIRIN 81 MG/1
81 TABLET, CHEWABLE ORAL DAILY
Qty: 90 TABLET | Refills: 3 | Status: CANCELLED | OUTPATIENT
Start: 2023-12-08

## 2023-12-09 ENCOUNTER — TELEPHONE (OUTPATIENT)
Dept: TRANSPLANT | Facility: CLINIC | Age: 53
End: 2023-12-09
Payer: COMMERCIAL

## 2023-12-09 NOTE — TELEPHONE ENCOUNTER
Pt called in with c/o chest congestion and bilateral ear pain. Denies fever. Pt reports negative home covid test. RNCC on-call recommends pt go into local urgent care to assess for urgent care or other infection.     Parameters reviewed for when to proceed to ED. Pt agreed with plan and will check in with primary RNCC on Monday.

## 2023-12-18 ENCOUNTER — TELEPHONE (OUTPATIENT)
Dept: TRANSPLANT | Facility: CLINIC | Age: 53
End: 2023-12-18

## 2023-12-18 ENCOUNTER — OFFICE VISIT (OUTPATIENT)
Dept: GASTROENTEROLOGY | Facility: CLINIC | Age: 53
End: 2023-12-18
Attending: INTERNAL MEDICINE
Payer: COMMERCIAL

## 2023-12-18 ENCOUNTER — LAB (OUTPATIENT)
Dept: LAB | Facility: CLINIC | Age: 53
End: 2023-12-18
Payer: COMMERCIAL

## 2023-12-18 VITALS
HEIGHT: 67 IN | BODY MASS INDEX: 23.54 KG/M2 | HEART RATE: 69 BPM | OXYGEN SATURATION: 96 % | SYSTOLIC BLOOD PRESSURE: 143 MMHG | DIASTOLIC BLOOD PRESSURE: 95 MMHG | WEIGHT: 150 LBS

## 2023-12-18 DIAGNOSIS — Z94.4 LIVER REPLACED BY TRANSPLANT (H): ICD-10-CM

## 2023-12-18 DIAGNOSIS — Z94.4 STATUS POST LIVER TRANSPLANTATION (H): Chronic | ICD-10-CM

## 2023-12-18 DIAGNOSIS — Z23 NEED FOR COVID-19 VACCINE: ICD-10-CM

## 2023-12-18 DIAGNOSIS — Z23 NEED FOR INFLUENZA VACCINATION: Primary | ICD-10-CM

## 2023-12-18 DIAGNOSIS — Z94.4 LIVER TRANSPLANTED (H): ICD-10-CM

## 2023-12-18 LAB
ALBUMIN SERPL BCG-MCNC: 3.2 G/DL (ref 3.5–5.2)
ALP SERPL-CCNC: 101 U/L (ref 40–150)
ALT SERPL W P-5'-P-CCNC: 20 U/L (ref 0–50)
ANION GAP SERPL CALCULATED.3IONS-SCNC: 10 MMOL/L (ref 7–15)
AST SERPL W P-5'-P-CCNC: 31 U/L (ref 0–45)
BASOPHILS # BLD AUTO: 0 10E3/UL (ref 0–0.2)
BASOPHILS NFR BLD AUTO: 1 %
BILIRUB DIRECT SERPL-MCNC: <0.2 MG/DL (ref 0–0.3)
BILIRUB SERPL-MCNC: 0.2 MG/DL
BUN SERPL-MCNC: 16.6 MG/DL (ref 6–20)
CALCIUM SERPL-MCNC: 7 MG/DL (ref 8.6–10)
CHLORIDE SERPL-SCNC: 106 MMOL/L (ref 98–107)
CREAT SERPL-MCNC: 1.08 MG/DL (ref 0.51–0.95)
DEPRECATED HCO3 PLAS-SCNC: 28 MMOL/L (ref 22–29)
EGFRCR SERPLBLD CKD-EPI 2021: 62 ML/MIN/1.73M2
EOSINOPHIL # BLD AUTO: 0.1 10E3/UL (ref 0–0.7)
EOSINOPHIL NFR BLD AUTO: 1 %
ERYTHROCYTE [DISTWIDTH] IN BLOOD BY AUTOMATED COUNT: 13.8 % (ref 10–15)
GLUCOSE SERPL-MCNC: 119 MG/DL (ref 70–99)
HCT VFR BLD AUTO: 34.4 % (ref 35–47)
HGB BLD-MCNC: 11.3 G/DL (ref 11.7–15.7)
IMM GRANULOCYTES # BLD: 0 10E3/UL
IMM GRANULOCYTES NFR BLD: 1 %
LYMPHOCYTES # BLD AUTO: 1.5 10E3/UL (ref 0.8–5.3)
LYMPHOCYTES NFR BLD AUTO: 26 %
MAGNESIUM SERPL-MCNC: 1.4 MG/DL (ref 1.7–2.3)
MCH RBC QN AUTO: 32.1 PG (ref 26.5–33)
MCHC RBC AUTO-ENTMCNC: 32.8 G/DL (ref 31.5–36.5)
MCV RBC AUTO: 98 FL (ref 78–100)
MONOCYTES # BLD AUTO: 0.3 10E3/UL (ref 0–1.3)
MONOCYTES NFR BLD AUTO: 6 %
NEUTROPHILS # BLD AUTO: 3.9 10E3/UL (ref 1.6–8.3)
NEUTROPHILS NFR BLD AUTO: 65 %
NRBC # BLD AUTO: 0 10E3/UL
NRBC BLD AUTO-RTO: 0 /100
PHOSPHATE SERPL-MCNC: 3.8 MG/DL (ref 2.5–4.5)
PLATELET # BLD AUTO: 245 10E3/UL (ref 150–450)
POTASSIUM SERPL-SCNC: 3.8 MMOL/L (ref 3.4–5.3)
PROT SERPL-MCNC: 6.2 G/DL (ref 6.4–8.3)
RBC # BLD AUTO: 3.52 10E6/UL (ref 3.8–5.2)
SODIUM SERPL-SCNC: 144 MMOL/L (ref 135–145)
WBC # BLD AUTO: 5.9 10E3/UL (ref 4–11)

## 2023-12-18 PROCEDURE — 91320 SARSCV2 VAC 30MCG TRS-SUC IM: CPT | Performed by: INTERNAL MEDICINE

## 2023-12-18 PROCEDURE — 83735 ASSAY OF MAGNESIUM: CPT | Performed by: PATHOLOGY

## 2023-12-18 PROCEDURE — 90682 RIV4 VACC RECOMBINANT DNA IM: CPT | Performed by: INTERNAL MEDICINE

## 2023-12-18 PROCEDURE — 250N000011 HC RX IP 250 OP 636: Performed by: INTERNAL MEDICINE

## 2023-12-18 PROCEDURE — 80053 COMPREHEN METABOLIC PANEL: CPT | Performed by: PATHOLOGY

## 2023-12-18 PROCEDURE — G0008 ADMIN INFLUENZA VIRUS VAC: HCPCS | Performed by: INTERNAL MEDICINE

## 2023-12-18 PROCEDURE — G0463 HOSPITAL OUTPT CLINIC VISIT: HCPCS | Mod: 25 | Performed by: INTERNAL MEDICINE

## 2023-12-18 PROCEDURE — 99214 OFFICE O/P EST MOD 30 MIN: CPT | Mod: GC | Performed by: INTERNAL MEDICINE

## 2023-12-18 PROCEDURE — 84100 ASSAY OF PHOSPHORUS: CPT | Performed by: PATHOLOGY

## 2023-12-18 PROCEDURE — 85025 COMPLETE CBC W/AUTO DIFF WBC: CPT | Performed by: PATHOLOGY

## 2023-12-18 PROCEDURE — 90480 ADMN SARSCOV2 VAC 1/ONLY CMP: CPT | Performed by: INTERNAL MEDICINE

## 2023-12-18 PROCEDURE — 36415 COLL VENOUS BLD VENIPUNCTURE: CPT | Performed by: PATHOLOGY

## 2023-12-18 PROCEDURE — 82248 BILIRUBIN DIRECT: CPT | Performed by: PATHOLOGY

## 2023-12-18 RX ADMIN — INFLUENZA A VIRUS A/WEST VIRGINIA/30/2022 (H1N1) RECOMBINANT HEMAGGLUTININ ANTIGEN, INFLUENZA A VIRUS A/DARWIN/6/2021 (H3N2) RECOMBINANT HEMAGGLUTININ ANTIGEN, INFLUENZA B VIRUS B/AUSTRIA/1359417/2021 RECOMBINANT HEMAGGLUTININ ANTIGEN, AND INFLUENZA B VIRUS B/PHUKET/3073/2013 RECOMBINANT HEMAGGLUTININ ANTIGEN 0.5 ML: 45; 45; 45; 45 INJECTION INTRAMUSCULAR at 15:10

## 2023-12-18 RX ADMIN — COVID-19 VACCINE, MRNA 30 MCG: 0.05 INJECTION, SUSPENSION INTRAMUSCULAR at 15:09

## 2023-12-18 ASSESSMENT — PAIN SCALES - GENERAL: PAINLEVEL: NO PAIN (0)

## 2023-12-18 NOTE — PROGRESS NOTES
Hepatology Follow-Up Visit:     HISTORY OF PRESENT ILLNESS:   I had the pleasure of seeing Susan Puckett for followup in the Liver Clinic at the [facility] on December 18, 2023. Ms. 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     Last seen in 9/2023. At that time she was having a lot of nausea. Nausea has improved after adjustment to metformin regimen. Continues to uyse Zofran 4-5 times weekly occasional use of Reglan.    Denies abdominal pain, but does have some mild skin itching around her ankles and wrists. Some fatigue noted especially on days when she is more active. Mild LE edema, but not present today.      Recently had a nerve block (12/2023) for previous shooting pains in her leg. This has improved the pain.      Diarrhea a few times a week. Usually occurs with abdominal cramping. Soft stools otherwise. No hematochezia or melena.    Following with hematology las seen 10/2023.    [place] in [place] upcoming [place].    Medications:   Current Outpatient Medications   Medication     magnesium glycinate lysinate chelate (DOCTOR'S BEST) 100 MG TABS tablet    acetaminophen (TYLENOL) 325 MG tablet    acyclovir (ZOVIRAX) 400 MG tablet    aspirin (ASA) 81 MG chewable tablet    calcium carbonate-vitamin D (OSCAL) 500-5 MG-MCG tablet    Continuous Blood Gluc  (DEXCOM G6 ) ULNA    Continuous Blood Gluc Sensor (DEXCOM G6 SENSOR) MISC    Continuous Blood Gluc Transmit (DEXCOM G6 TRANSMITTER) MISC    cyanocobalamin (VITAMIN B-12) 1000 MCG tablet    diazepam (VALIUM) 5 MG tablet    docusate sodium (DSS) 100 MG capsule    escitalopram (LEXAPRO) 20 MG tablet    fludrocortisone (FLORINEF) 0.1 MG tablet    folic acid (FOLVITE) 1 MG tablet    gabapentin (NEURONTIN) 100 MG capsule    hydrOXYzine (ATARAX) 25 MG tablet    levothyroxine (SYNTHROID/LEVOTHROID) 125 MCG tablet     LORazepam (ATIVAN) 0.5 MG tablet    melatonin 5 MG tablet    metoclopramide (REGLAN) 5 MG tablet    ondansetron (ZOFRAN ODT) 8 MG ODT tab    SUMAtriptan (IMITREX) 20 MG/ACT nasal spray    tacrolimus (GENERIC) 1 MG capsule    topiramate (TOPAMAX) 25 MG tablet    traZODone (DESYREL) 50 MG tablet     No current facility-administered medications for this visit.        Vitals:   There were no vitals taken for this visit.    Physical Exam:   In general no acute distress. HEENT exam shows no scleral icterus or temporal muscle wasting. Chest is clear. Abdominal exam shows no increase in girth. No masses or tenderness to palpation are present. No hepatomegaly. No spleen tip is palpable. Extremity exam shows no edema. Skin exam shows no stigmata of chronic liver disease. Neurologic exam shows no asterixis.     Labs:   Lab Results   Component Value Date     12/04/2023    POTASSIUM 4.3 12/04/2023    CHLORIDE 109 (H) 12/04/2023    ANIONGAP 7 12/04/2023    CO2 25 12/04/2023    BUN 12.0 12/04/2023    CR 1.04 (H) 12/04/2023    GFRESTIMATED 65 12/04/2023    MAURI 7.3 (L) 12/04/2023      Lab Results   Component Value Date    WBC 4.7 11/06/2023    HGB 11.2 (L) 11/06/2023    HCT 35.4 11/06/2023    MCV 99 11/06/2023    MCH 31.2 11/06/2023    MCHC 31.6 11/06/2023    RDW 13.3 11/06/2023     11/06/2023     Lab Results   Component Value Date    ALBUMIN 3.3 (L) 12/04/2023    ALKPHOS 99 12/04/2023    AST 26 12/04/2023     Lab Results   Component Value Date    INR 1.32 (H) 11/04/2022       Computed MELD 3.0 unavailable. One or more values for this score either were not found within the given timeframe or did not fit some other criterion.  Computed MELD-Na unavailable. One or more values for this score either were not found within the given timeframe or did not fit some other criterion.    Imaging:   No images are attached to the encounter.     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 Course following had been complicated by hemolytic anemia which is now resolved. Continues on tacrolimus 2 mg bid (recently reduced) as well as florinef 0.1 mg daily. Since the reduction in tacrolimus will attempt trial off of florinef with repeat labs in 1 month. If potassium is in a normal range will continue off florinef. Does endorse some fatigue and is questioning continuing on gabapentin. It is possible this could be contributing there for she will plan to discontinue gabapentin and if there is concern she will contact the clinic. Otherwise plan to RTC in 6 months or sooner if needed.   - stop florinef 0.1 mg daily  - continue tacrolimus 2 mg bid   - stop gabapentin   - RTC in 6 months of sooner if needed    2.  HCM  Encouraged exercise and healthy diet as able. Further Susan will plan for dermatology follow up next 3/2024. Vaccination UTD.   - COVID and influenza vaccination completed 12/18    Care plan and patient seen with Dr. Cook.    Audelia Kendrick MD  Internal Medicine PGY 2

## 2023-12-18 NOTE — TELEPHONE ENCOUNTER
"Susan called to inform RNCC that she is on her way to [facility] ED. When she was in the clinic this afternoon waiting for vaccines, her blood sugar dropped so she ate some fruit snacks. After that she says her vision got \"cloudy\" and then she had a flasher/floater in her left eye. Eventually, her vision went almost all black except for the \"upper left corner of her phone\" when she was trying to text her . She told the staff member that was going to do her vaccine of her symptoms, the staff member asked Susan if she wanted her to get a nurse, to which Susan said no. Her vision improved enough for her to feel comfortable to drive home. When she got home, she called her eye clinic and they told her to go to the ED to r/o stroke. RNCC confirmed that she should be checked out at the ED. Susan is on her way at this time.   "

## 2023-12-18 NOTE — NURSING NOTE
"Chief Complaint   Patient presents with    RECHECK     3 month follow-up      Vital signs:      BP: (!) 143/95 Pulse: 69     SpO2: 96 %     Height: 168.9 cm (5' 6.5\") (pt reported) Weight: 68 kg (150 lb)  Estimated body mass index is 23.85 kg/m  as calculated from the following:    Height as of this encounter: 1.689 m (5' 6.5\").    Weight as of this encounter: 68 kg (150 lb).      Christine Reid CMA   12/18/2023 2:14 PM    "

## 2023-12-18 NOTE — LETTER
12/18/2023         RE: Susan Puckett  [address] [id number]        Dear Colleague,    Thank you for referring your patient, Susan Puckett, to the [facility] [place]. Please see a copy of my visit note below.    Hepatology Follow-Up Visit:     HISTORY OF PRESENT ILLNESS:   I had the pleasure of seeing Susan Puckett for followup in the Liver Clinic at the [facility] on December 18, 2023. Ms. 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     Last seen in 9/2023. At that time she was having a lot of nausea. Nausea has improved after adjustment to metformin regimen. Continues to uyse Zofran 4-5 times weekly occasional use of Reglan.    Denies abdominal pain, but does have some mild skin itching around her ankles and wrists. Some fatigue noted especially on days when she is more active. Mild LE edema, but not present today.      Recently had a nerve block (12/2023) for previous shooting pains in her leg. This has improved the pain.      Diarrhea a few times a week. Usually occurs with abdominal cramping. Soft stools otherwise. No hematochezia or melena.    Following with hematology las seen 10/2023.    [place] in the upcoming months.    Medications:   Current Outpatient Medications   Medication     magnesium glycinate lysinate chelate (DOCTOR'S BEST) 100 MG TABS tablet    acetaminophen (TYLENOL) 325 MG tablet    acyclovir (ZOVIRAX) 400 MG tablet    aspirin (ASA) 81 MG chewable tablet    calcium carbonate-vitamin D (OSCAL) 500-5 MG-MCG tablet    Continuous Blood Gluc  (DEXCOM G6 ) LUNA    Continuous Blood Gluc Sensor (DEXCOM G6 SENSOR) MISC    Continuous Blood Gluc Transmit (DEXCOM G6 TRANSMITTER) MISC    cyanocobalamin (VITAMIN B-12) 1000 MCG tablet    diazepam (VALIUM) 5 MG tablet    docusate sodium (DSS) 100 MG  capsule    escitalopram (LEXAPRO) 20 MG tablet    fludrocortisone (FLORINEF) 0.1 MG tablet    folic acid (FOLVITE) 1 MG tablet    gabapentin (NEURONTIN) 100 MG capsule    hydrOXYzine (ATARAX) 25 MG tablet    levothyroxine (SYNTHROID/LEVOTHROID) 125 MCG tablet    LORazepam (ATIVAN) 0.5 MG tablet    melatonin 5 MG tablet    metoclopramide (REGLAN) 5 MG tablet    ondansetron (ZOFRAN ODT) 8 MG ODT tab    SUMAtriptan (IMITREX) 20 MG/ACT nasal spray    tacrolimus (GENERIC) 1 MG capsule    topiramate (TOPAMAX) 25 MG tablet    traZODone (DESYREL) 50 MG tablet     No current facility-administered medications for this visit.        Vitals:   There were no vitals taken for this visit.    Physical Exam:   In general no acute distress. HEENT exam shows no scleral icterus or temporal muscle wasting. Chest is clear. Abdominal exam shows no increase in girth. No masses or tenderness to palpation are present. No hepatomegaly. No spleen tip is palpable. Extremity exam shows no edema. Skin exam shows no stigmata of chronic liver disease. Neurologic exam shows no asterixis.     Labs:   Lab Results   Component Value Date     12/04/2023    POTASSIUM 4.3 12/04/2023    CHLORIDE 109 (H) 12/04/2023    ANIONGAP 7 12/04/2023    CO2 25 12/04/2023    BUN 12.0 12/04/2023    CR 1.04 (H) 12/04/2023    GFRESTIMATED 65 12/04/2023    MAURI 7.3 (L) 12/04/2023      Lab Results   Component Value Date    WBC 4.7 11/06/2023    HGB 11.2 (L) 11/06/2023    HCT 35.4 11/06/2023    MCV 99 11/06/2023    MCH 31.2 11/06/2023    MCHC 31.6 11/06/2023    RDW 13.3 11/06/2023     11/06/2023     Lab Results   Component Value Date    ALBUMIN 3.3 (L) 12/04/2023    ALKPHOS 99 12/04/2023    AST 26 12/04/2023     Lab Results   Component Value Date    INR 1.32 (H) 11/04/2022       Computed MELD 3.0 unavailable. One or more values for this score either were not found within the given timeframe or did not fit some other criterion.  Computed MELD-Na unavailable. One or  more values for this score either were not found within the given timeframe or did not fit some other criterion.    Imaging:   No images are attached to the encounter.     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 Course following had been complicated by hemolytic anemia which is now resolved. Continues on tacrolimus 2 mg bid (recently reduced) as well as florinef 0.1 mg daily. Since the reduction in tacrolimus will attempt trial off of florinef with repeat labs in 1 month. If potassium is in a normal range will continue off florinef. Does endorse some fatigue and is questioning continuing on gabapentin. It is possible this could be contributing there for she will plan to discontinue gabapentin and if there is concern she will contact the clinic. Otherwise plan to RTC in 6 months or sooner if needed.   - stop florinef 0.1 mg daily  - continue tacrolimus 2 mg bid   - stop gabapentin   - RTC in 6 months of sooner if needed    2.  HCM  Encouraged exercise and healthy diet as able. Further Susan will plan for dermatology follow up next 3/2024. Vaccination UTD.   - COVID and influenza vaccination completed 12/18    Care plan and patient seen with Dr. Cook.    Audelia Kendrick MD  Internal Medicine PGY 2        Attestation signed by Raphael Cook MD at 12/18/2023  3:48 PM (Updated):  The patient was seen and examined.  The above assessment and plan was developed jointly with the resident.      I did spend a total of 30 minutes (on the date of the encounter), including 20 minutes of face-to-face clinic time including counseling. The rest of the time was spent in documentation and review of records.      Thank you very much for allowing me to participate in the care of this patient.  If you have any questions regarding my recommendations, please do not hesitate to contact me.         Raphael Cook MD      Professor of Medicine  [employer] [employer] [facility] [employer]  [employer]      Executive Medical Director, Solid Organ Transplant Program  [facility]

## 2023-12-29 DIAGNOSIS — Z94.4 LIVER REPLACED BY TRANSPLANT (H): ICD-10-CM

## 2023-12-29 RX ORDER — ASPIRIN 81 MG/1
81 TABLET, CHEWABLE ORAL DAILY
Qty: 90 TABLET | Refills: 3 | Status: ON HOLD | OUTPATIENT
Start: 2023-12-29 | End: 2024-03-04

## 2024-01-07 ENCOUNTER — HEALTH MAINTENANCE LETTER (OUTPATIENT)
Age: 54
End: 2024-01-07

## 2024-01-24 ENCOUNTER — LAB (OUTPATIENT)
Dept: LAB | Facility: CLINIC | Age: 54
End: 2024-01-24
Payer: COMMERCIAL

## 2024-01-24 DIAGNOSIS — Z94.4 LIVER REPLACED BY TRANSPLANT (H): ICD-10-CM

## 2024-01-24 DIAGNOSIS — Z13.220 LIPID SCREENING: ICD-10-CM

## 2024-01-24 DIAGNOSIS — Z94.4 LIVER TRANSPLANTED (H): ICD-10-CM

## 2024-01-24 LAB
ALBUMIN SERPL BCG-MCNC: 3 G/DL (ref 3.5–5.2)
ALP SERPL-CCNC: 101 U/L (ref 40–150)
ALT SERPL W P-5'-P-CCNC: 9 U/L (ref 0–50)
ANION GAP SERPL CALCULATED.3IONS-SCNC: 6 MMOL/L (ref 7–15)
AST SERPL W P-5'-P-CCNC: 23 U/L (ref 0–45)
BASOPHILS # BLD AUTO: 0 10E3/UL (ref 0–0.2)
BASOPHILS NFR BLD AUTO: 1 %
BILIRUB DIRECT SERPL-MCNC: <0.2 MG/DL (ref 0–0.3)
BILIRUB SERPL-MCNC: 0.2 MG/DL
BUN SERPL-MCNC: 15.5 MG/DL (ref 6–20)
CALCIUM SERPL-MCNC: 7.2 MG/DL (ref 8.6–10)
CHLORIDE SERPL-SCNC: 104 MMOL/L (ref 98–107)
CHOLEST SERPL-MCNC: 125 MG/DL
CREAT SERPL-MCNC: 1.24 MG/DL (ref 0.51–0.95)
DEPRECATED HCO3 PLAS-SCNC: 28 MMOL/L (ref 22–29)
EGFRCR SERPLBLD CKD-EPI 2021: 52 ML/MIN/1.73M2
EOSINOPHIL # BLD AUTO: 0.2 10E3/UL (ref 0–0.7)
EOSINOPHIL NFR BLD AUTO: 3 %
ERYTHROCYTE [DISTWIDTH] IN BLOOD BY AUTOMATED COUNT: 12.8 % (ref 10–15)
FASTING STATUS PATIENT QL REPORTED: NO
GLUCOSE SERPL-MCNC: 112 MG/DL (ref 70–99)
HCT VFR BLD AUTO: 34.3 % (ref 35–47)
HDLC SERPL-MCNC: 49 MG/DL
HGB BLD-MCNC: 11.1 G/DL (ref 11.7–15.7)
IMM GRANULOCYTES # BLD: 0 10E3/UL
IMM GRANULOCYTES NFR BLD: 0 %
LDLC SERPL CALC-MCNC: 59 MG/DL
LYMPHOCYTES # BLD AUTO: 1.6 10E3/UL (ref 0.8–5.3)
LYMPHOCYTES NFR BLD AUTO: 23 %
MAGNESIUM SERPL-MCNC: 1.5 MG/DL (ref 1.7–2.3)
MCH RBC QN AUTO: 31.5 PG (ref 26.5–33)
MCHC RBC AUTO-ENTMCNC: 32.4 G/DL (ref 31.5–36.5)
MCV RBC AUTO: 97 FL (ref 78–100)
MONOCYTES # BLD AUTO: 0.4 10E3/UL (ref 0–1.3)
MONOCYTES NFR BLD AUTO: 6 %
NEUTROPHILS # BLD AUTO: 4.7 10E3/UL (ref 1.6–8.3)
NEUTROPHILS NFR BLD AUTO: 67 %
NONHDLC SERPL-MCNC: 76 MG/DL
NRBC # BLD AUTO: 0 10E3/UL
NRBC BLD AUTO-RTO: 0 /100
PHOSPHATE SERPL-MCNC: 4.1 MG/DL (ref 2.5–4.5)
PLATELET # BLD AUTO: 230 10E3/UL (ref 150–450)
POTASSIUM SERPL-SCNC: 4.4 MMOL/L (ref 3.4–5.3)
PROT SERPL-MCNC: 5.8 G/DL (ref 6.4–8.3)
RBC # BLD AUTO: 3.52 10E6/UL (ref 3.8–5.2)
SODIUM SERPL-SCNC: 138 MMOL/L (ref 135–145)
TACROLIMUS BLD-MCNC: 4.8 UG/L (ref 5–15)
TME LAST DOSE: ABNORMAL H
TME LAST DOSE: ABNORMAL H
TRIGL SERPL-MCNC: 86 MG/DL
WBC # BLD AUTO: 7 10E3/UL (ref 4–11)

## 2024-01-24 PROCEDURE — 84100 ASSAY OF PHOSPHORUS: CPT

## 2024-01-24 PROCEDURE — 80053 COMPREHEN METABOLIC PANEL: CPT

## 2024-01-24 PROCEDURE — 80061 LIPID PANEL: CPT

## 2024-01-24 PROCEDURE — 81003 URINALYSIS AUTO W/O SCOPE: CPT

## 2024-01-24 PROCEDURE — 85025 COMPLETE CBC W/AUTO DIFF WBC: CPT

## 2024-01-24 PROCEDURE — 83735 ASSAY OF MAGNESIUM: CPT

## 2024-01-24 PROCEDURE — 87517 HEPATITIS B DNA QUANT: CPT

## 2024-01-24 PROCEDURE — 36415 COLL VENOUS BLD VENIPUNCTURE: CPT

## 2024-01-24 PROCEDURE — 84156 ASSAY OF PROTEIN URINE: CPT

## 2024-01-24 PROCEDURE — 82248 BILIRUBIN DIRECT: CPT

## 2024-01-24 PROCEDURE — 80197 ASSAY OF TACROLIMUS: CPT

## 2024-01-25 ENCOUNTER — APPOINTMENT (OUTPATIENT)
Dept: LAB | Facility: CLINIC | Age: 54
End: 2024-01-25
Payer: COMMERCIAL

## 2024-01-25 LAB
ALBUMIN MFR UR ELPH: 8.1 MG/DL
ALBUMIN UR-MCNC: NEGATIVE MG/DL
APPEARANCE UR: CLEAR
BILIRUB UR QL STRIP: NEGATIVE
COLOR UR AUTO: ABNORMAL
CREAT UR-MCNC: 83.5 MG/DL
GLUCOSE UR STRIP-MCNC: 50 MG/DL
HBV DNA SERPL NAA+PROBE-ACNC: NOT DETECTED IU/ML
HGB UR QL STRIP: NEGATIVE
KETONES UR STRIP-MCNC: NEGATIVE MG/DL
LEUKOCYTE ESTERASE UR QL STRIP: NEGATIVE
NITRATE UR QL: NEGATIVE
PH UR STRIP: 6.5 [PH] (ref 5–7)
PROT/CREAT 24H UR: 0.1 MG/MG CR (ref 0–0.2)
SP GR UR STRIP: 1.02 (ref 1–1.03)
UROBILINOGEN UR STRIP-MCNC: NORMAL MG/DL

## 2024-01-30 ENCOUNTER — TELEPHONE (OUTPATIENT)
Dept: TRANSPLANT | Facility: CLINIC | Age: 54
End: 2024-01-30
Payer: COMMERCIAL

## 2024-01-30 ASSESSMENT — ENCOUNTER SYMPTOMS: NEW SYMPTOMS OF CORONARY ARTERY DISEASE: 0

## 2024-02-09 ENCOUNTER — TELEPHONE (OUTPATIENT)
Dept: TRANSPLANT | Facility: CLINIC | Age: 54
End: 2024-02-09
Payer: COMMERCIAL

## 2024-02-09 ENCOUNTER — TELEPHONE (OUTPATIENT)
Dept: DERMATOLOGY | Facility: CLINIC | Age: 54
End: 2024-02-09
Payer: COMMERCIAL

## 2024-02-09 NOTE — TELEPHONE ENCOUNTER
Left Voicemail (1st Attempt) and Sent Mychart (1st Attempt) for the patient to call back and schedule the following:    Appointment type: Return  Provider: Dr. Deleon  Return date: 4/1/24  Specialty phone number: [phone]

## 2024-02-10 NOTE — TELEPHONE ENCOUNTER
Rivera,  paged, that patient is at [facility] ER.  Patient had a diabetic seizure and passed out and hit her head.  Vision changes, and has brain bleed.   Rivera reports that this is nothing to worry about it.   CT scan completed and in 6 hours she has another one.   She has headaches and hard time breathing.  Broken thumb.  Admitted at [facility].       Rivera reports about a month ago she had vision changes.     Susan is talking in background ER MD. Stafford aware will alert hepatologist. Updated Dr Burgess, on call MD.    Per dr burgess have a 12 hour tacro level drawn.  Spoke with AJAY Parks at [facility] to give recommendation and to call tx office with any concerns or issues.

## 2024-02-13 ENCOUNTER — TELEPHONE (OUTPATIENT)
Dept: DERMATOLOGY | Facility: CLINIC | Age: 54
End: 2024-02-13
Payer: COMMERCIAL

## 2024-02-13 NOTE — TELEPHONE ENCOUNTER
Left Voicemail (2nd Attempt) informing pt the following has been cancelled     Appointment type: TSC  Provider: Dr. Deleon  Return date: 4/1/24  Specialty phone number: [phone]

## 2024-02-16 ENCOUNTER — TELEPHONE (OUTPATIENT)
Dept: TRANSPLANT | Facility: CLINIC | Age: 54
End: 2024-02-16
Payer: COMMERCIAL

## 2024-02-16 NOTE — TELEPHONE ENCOUNTER
Call to Susan. Headaches getting worse. She called neurology who told her to go to ED. Scheduled to have CT this afternoon to compare to last week. The pain med she got for her headaches isn't working. Her nausea med isn't working. She states she is in so much pain and nothing is helping.      Encouraged Susan to follow her neurologists instructions and go to the ED. She is on her way to [facility] as this where she received emergency care last week. She just wanted to inform transplant team of what is going on.

## 2024-02-16 NOTE — TELEPHONE ENCOUNTER
Patient called stated she is having symptoms again from her last time she fell. Patient would like a return call ASAP.

## 2024-02-21 ENCOUNTER — TELEPHONE (OUTPATIENT)
Dept: TRANSPLANT | Facility: CLINIC | Age: 54
End: 2024-02-21
Payer: COMMERCIAL

## 2024-02-21 NOTE — TELEPHONE ENCOUNTER
Back in ER. Discharged over the weekend from [facility]. Now in ED at [facility]. She is having trouble urinating and stooling. She is frustrated with lack of answers from [facility]. Informed her that she can come to the [facility] ED.

## 2024-02-22 ENCOUNTER — TELEPHONE (OUTPATIENT)
Dept: TRANSPLANT | Facility: CLINIC | Age: 54
End: 2024-02-22
Payer: COMMERCIAL

## 2024-02-22 NOTE — TELEPHONE ENCOUNTER
Susan admitted at [facility]. She wants to come to [facility]. She feels that they are not doing anything for her. She had a urinary catheter placed for urinary retention that she was told was due to the narcotics she has been taking for her headache and broken bones (thumb and multiple ribs). She said her creatinine is really high, it was 1.31 today, and that nephrology was going to come see her. She was upset that they hadn't come yet.   Informed her that if she wants to be transferred to [facility], the providers there will need to initiate a transfer.   When Susan called last week on her way to [facility] ED, RNCC suggested that she come to the [facility] ED.

## 2024-02-26 ENCOUNTER — TELEPHONE (OUTPATIENT)
Dept: TRANSPLANT | Facility: CLINIC | Age: 54
End: 2024-02-26
Payer: COMMERCIAL

## 2024-02-26 NOTE — TELEPHONE ENCOUNTER
"Still in the hospital, tried to get transferred to  on Friday evening, but MD at  wouldn't transfer her and was \"very rude\". Catheter burns when in, but no infection, staff telling her it is normal to be uncomfortable. Intermittent voiding trials, able to void 100 ml, 277 ml leftover, getting recatheterized. Susan asked if she can take pyridium for urinary burning. Message to Davi Clayton, ok if creatinine clearance greater than 50. Current creat 1.21, so he said should be fine. Xray to see if still constipated, burning hemorrhoids. Lots of pain in ribs, urethra, rectum, but won't take any pain meds. Encouraged her to ask for pain meds and some numbing gel for catheter placement. Advised her that what she feels is not the right care, is likely what would be done at any hospital.   "

## 2024-02-29 ENCOUNTER — TELEPHONE (OUTPATIENT)
Dept: TRANSPLANT | Facility: CLINIC | Age: 54
End: 2024-02-29
Payer: COMMERCIAL

## 2024-02-29 ENCOUNTER — APPOINTMENT (OUTPATIENT)
Dept: CT IMAGING | Facility: CLINIC | Age: 54
End: 2024-02-29
Attending: EMERGENCY MEDICINE
Payer: COMMERCIAL

## 2024-02-29 ENCOUNTER — APPOINTMENT (OUTPATIENT)
Dept: MRI IMAGING | Facility: CLINIC | Age: 54
End: 2024-02-29
Attending: EMERGENCY MEDICINE
Payer: COMMERCIAL

## 2024-02-29 ENCOUNTER — HOSPITAL ENCOUNTER (EMERGENCY)
Facility: CLINIC | Age: 54
Discharge: HOME | End: 2024-03-01
Attending: EMERGENCY MEDICINE | Admitting: EMERGENCY MEDICINE
Payer: COMMERCIAL

## 2024-02-29 VITALS
RESPIRATION RATE: 20 BRPM | WEIGHT: 150 LBS | BODY MASS INDEX: 24.11 KG/M2 | TEMPERATURE: 98.5 F | HEIGHT: 66 IN | DIASTOLIC BLOOD PRESSURE: 87 MMHG | SYSTOLIC BLOOD PRESSURE: 135 MMHG | HEART RATE: 76 BPM | OXYGEN SATURATION: 97 %

## 2024-02-29 DIAGNOSIS — K59.09 CHRONIC CONSTIPATION: ICD-10-CM

## 2024-02-29 LAB
ALBUMIN UR-MCNC: NEGATIVE MG/DL
ANION GAP SERPL CALCULATED.3IONS-SCNC: 11 MMOL/L (ref 7–15)
APPEARANCE UR: CLEAR
BACTERIA #/AREA URNS HPF: ABNORMAL /HPF
BASOPHILS # BLD AUTO: 0.1 10E3/UL (ref 0–0.2)
BASOPHILS NFR BLD AUTO: 1 %
BILIRUB UR QL STRIP: NEGATIVE
BUN SERPL-MCNC: 16.8 MG/DL (ref 6–20)
CALCIUM SERPL-MCNC: 8.5 MG/DL (ref 8.6–10)
CHLORIDE SERPL-SCNC: 101 MMOL/L (ref 98–107)
COLOR UR AUTO: ABNORMAL
CREAT SERPL-MCNC: 1.45 MG/DL (ref 0.51–0.95)
CRP SERPL-MCNC: <3 MG/L
DEPRECATED HCO3 PLAS-SCNC: 24 MMOL/L (ref 22–29)
EGFRCR SERPLBLD CKD-EPI 2021: 43 ML/MIN/1.73M2
EOSINOPHIL # BLD AUTO: 0.2 10E3/UL (ref 0–0.7)
EOSINOPHIL NFR BLD AUTO: 2 %
ERYTHROCYTE [DISTWIDTH] IN BLOOD BY AUTOMATED COUNT: 12.8 % (ref 10–15)
ERYTHROCYTE [SEDIMENTATION RATE] IN BLOOD BY WESTERGREN METHOD: 23 MM/HR (ref 0–30)
GLUCOSE SERPL-MCNC: 128 MG/DL (ref 70–99)
GLUCOSE UR STRIP-MCNC: NEGATIVE MG/DL
HCT VFR BLD AUTO: 38.8 % (ref 35–47)
HGB BLD-MCNC: 12.4 G/DL (ref 11.7–15.7)
HGB UR QL STRIP: NEGATIVE
IMM GRANULOCYTES # BLD: 0 10E3/UL
IMM GRANULOCYTES NFR BLD: 0 %
KETONES UR STRIP-MCNC: NEGATIVE MG/DL
LEUKOCYTE ESTERASE UR QL STRIP: NEGATIVE
LYMPHOCYTES # BLD AUTO: 1.6 10E3/UL (ref 0.8–5.3)
LYMPHOCYTES NFR BLD AUTO: 20 %
MCH RBC QN AUTO: 31.2 PG (ref 26.5–33)
MCHC RBC AUTO-ENTMCNC: 32 G/DL (ref 31.5–36.5)
MCV RBC AUTO: 98 FL (ref 78–100)
MONOCYTES # BLD AUTO: 0.4 10E3/UL (ref 0–1.3)
MONOCYTES NFR BLD AUTO: 5 %
NEUTROPHILS # BLD AUTO: 5.7 10E3/UL (ref 1.6–8.3)
NEUTROPHILS NFR BLD AUTO: 72 %
NITRATE UR QL: POSITIVE
NRBC # BLD AUTO: 0 10E3/UL
NRBC BLD AUTO-RTO: 0 /100
PH UR STRIP: 5.5 [PH] (ref 5–7)
PLATELET # BLD AUTO: 246 10E3/UL (ref 150–450)
POTASSIUM SERPL-SCNC: 4.3 MMOL/L (ref 3.4–5.3)
RBC # BLD AUTO: 3.98 10E6/UL (ref 3.8–5.2)
RBC URINE: 0 /HPF
SODIUM SERPL-SCNC: 136 MMOL/L (ref 135–145)
SP GR UR STRIP: 1.01 (ref 1–1.03)
SQUAMOUS EPITHELIAL: 1 /HPF
TRANSITIONAL EPI: <1 /HPF
UROBILINOGEN UR STRIP-MCNC: NORMAL MG/DL
WBC # BLD AUTO: 7.8 10E3/UL (ref 4–11)
WBC URINE: <1 /HPF

## 2024-02-29 PROCEDURE — 99284 EMERGENCY DEPT VISIT MOD MDM: CPT | Performed by: EMERGENCY MEDICINE

## 2024-02-29 PROCEDURE — 80048 BASIC METABOLIC PNL TOTAL CA: CPT | Performed by: EMERGENCY MEDICINE

## 2024-02-29 PROCEDURE — 72148 MRI LUMBAR SPINE W/O DYE: CPT | Mod: 26 | Performed by: STUDENT IN AN ORGANIZED HEALTH CARE EDUCATION/TRAINING PROGRAM

## 2024-02-29 PROCEDURE — 85652 RBC SED RATE AUTOMATED: CPT | Performed by: EMERGENCY MEDICINE

## 2024-02-29 PROCEDURE — 72192 CT PELVIS W/O DYE: CPT | Mod: 26 | Performed by: RADIOLOGY

## 2024-02-29 PROCEDURE — 72148 MRI LUMBAR SPINE W/O DYE: CPT

## 2024-02-29 PROCEDURE — 250N000012 HC RX MED GY IP 250 OP 636 PS 637: Performed by: EMERGENCY MEDICINE

## 2024-02-29 PROCEDURE — 250N000013 HC RX MED GY IP 250 OP 250 PS 637: Performed by: EMERGENCY MEDICINE

## 2024-02-29 PROCEDURE — 72192 CT PELVIS W/O DYE: CPT

## 2024-02-29 PROCEDURE — 87086 URINE CULTURE/COLONY COUNT: CPT | Performed by: EMERGENCY MEDICINE

## 2024-02-29 PROCEDURE — 99285 EMERGENCY DEPT VISIT HI MDM: CPT | Mod: 25 | Performed by: EMERGENCY MEDICINE

## 2024-02-29 PROCEDURE — 85025 COMPLETE CBC W/AUTO DIFF WBC: CPT | Performed by: EMERGENCY MEDICINE

## 2024-02-29 PROCEDURE — 81001 URINALYSIS AUTO W/SCOPE: CPT | Performed by: EMERGENCY MEDICINE

## 2024-02-29 PROCEDURE — 72131 CT LUMBAR SPINE W/O DYE: CPT

## 2024-02-29 PROCEDURE — 250N000011 HC RX IP 250 OP 636: Performed by: EMERGENCY MEDICINE

## 2024-02-29 PROCEDURE — 36415 COLL VENOUS BLD VENIPUNCTURE: CPT | Performed by: EMERGENCY MEDICINE

## 2024-02-29 PROCEDURE — 86140 C-REACTIVE PROTEIN: CPT | Performed by: EMERGENCY MEDICINE

## 2024-02-29 PROCEDURE — 72131 CT LUMBAR SPINE W/O DYE: CPT | Mod: 26 | Performed by: RADIOLOGY

## 2024-02-29 PROCEDURE — 96365 THER/PROPH/DIAG IV INF INIT: CPT | Performed by: EMERGENCY MEDICINE

## 2024-02-29 RX ORDER — TACROLIMUS 1 MG/1
2 CAPSULE ORAL ONCE
Qty: 2 CAPSULE | Refills: 0 | Status: COMPLETED | OUTPATIENT
Start: 2024-02-29 | End: 2024-02-29

## 2024-02-29 RX ORDER — HYDROMORPHONE HYDROCHLORIDE 2 MG/1
4 TABLET ORAL ONCE
Status: COMPLETED | OUTPATIENT
Start: 2024-02-29 | End: 2024-02-29

## 2024-02-29 RX ORDER — LORAZEPAM 0.5 MG/1
1 TABLET ORAL ONCE
Status: COMPLETED | OUTPATIENT
Start: 2024-02-29 | End: 2024-02-29

## 2024-02-29 RX ORDER — CEFTRIAXONE 2 G/1
2 INJECTION, POWDER, FOR SOLUTION INTRAMUSCULAR; INTRAVENOUS ONCE
Status: COMPLETED | OUTPATIENT
Start: 2024-02-29 | End: 2024-02-29

## 2024-02-29 RX ADMIN — TACROLIMUS 2 MG: 1 CAPSULE ORAL at 22:00

## 2024-02-29 RX ADMIN — HYDROMORPHONE HYDROCHLORIDE 4 MG: 2 TABLET ORAL at 21:32

## 2024-02-29 RX ADMIN — LORAZEPAM 1 MG: 0.5 TABLET ORAL at 21:31

## 2024-02-29 RX ADMIN — CEFTRIAXONE SODIUM 2 G: 2 INJECTION, POWDER, FOR SOLUTION INTRAMUSCULAR; INTRAVENOUS at 21:33

## 2024-02-29 ASSESSMENT — ACTIVITIES OF DAILY LIVING (ADL)
ADLS_ACUITY_SCORE: 43

## 2024-02-29 NOTE — ED PROVIDER NOTES
[place] EMERGENCY DEPARTMENT ([facility])    2/29/24       ED PROVIDER NOTE   6:12 PM   History     Chief Complaint   Patient presents with    Urinary Retention     The history is provided by the patient, medical records and the spouse.     Susan Puckett is a 53 year old female with history of chronic pain on opiate pain management, and ESLD s/p liver transplant in 2022, type 2 diabetes, as well as recent hospitalization for hypoglycemia induced seizure like spell (resulting in ground-level fall with multiple rib fractures, subarachnoid hemorrhage) as well as hospitalization for CAUTI, CKD stage III who presents to the emergency department with right gluteal pain for the past 3 weeks as well as rectal, urethral pain for the past 2 weeks.      Patient states that she has had this right gluteal pain ever since she had a hypoglycemia seizure, fell and suffered fractures and subarachnoid hemorrhage on 2/9/24.  She states that she reported this to her inpatient team at [facility] but they seemed to not care about this.  She states that this was not evaluated despite how much she has mentioned it.  They told her it was musculoskeletal pain which she felt was dismissive.  Since then she has had worsening pain in her groin and right glute to point where she can't sit and this is distressing to her.  She also developed painful stooling and developed hemorrhoids hemorrhoids which is new since 2/16/24. She was seen at [facility] emergency department on 2/21/2024 for urinary retention and constipation, had a urinary catheter placed which caused more burning pain but her providers did not seem concerned by this, was told that this was to be expected given the presence of the catheter.  She states that a urologist flew by twice at [facility] without physically examining her, asked her questions for 5 minutes and left.  Catheter was in place from 2/21-2/26/24, and she had ongoing  urethral pain afterwards.  She was found to have a positive urine culture, was started on Omnicef for catheter associated UTI.    Patient was discharged home, did follow-up in clinic, reported vaginal, ureteral, rectal pain as well as right buttock pain radiating down her right leg to midthigh.  She underwent pelvic exam which was unremarkable and had wet prep that was positive for yeast.  She is also noted to have several external hemorrhoids, non bleeding, tender to touch, as well as tenderness into right mid to lower buttocks, down to right posterior mid thigh.  Her clinician was concern for possible cauda equina due to significant constipation and urinary retention issues as well as perennial pain.  An MRI of the lumbar spine, Valium, gabapentin-lidocaine gel was ordered for her.     Patient came in to the ER tonight because the /rectal/buttock pain has been intolerable.  This pain worsens with moving her legs.  She h reports bladder pressure, urinary urgency, but decreased urination and decreased urinary frequency despite hydrating copiously.   states she has tried to void several times but at most has been voiding twice a day for the past few days, last voided today at 4 PM. No burning with voiding She feels she has to stool all the time, is taking senna and MiraLAX. Has lumbar back pain as well as weakness in legs;  notes that she has been limping. Patient states she has been using a walker or cane to ambulate since the fall. No numbness or tingling in lower extremities. No vaginal redness, discharge, or lesions.  No follow-up imaging since initial injuries, to her and 's dismay. She has had headaches and nausea. Patient was started on various medications since first hospitalization, and one of her medications have been held due to brain bleed. She was started on methocarbamol, oxycodone, hydrocodone, oxybutynin as well as pyridium. She would like a dose of gabapentin, notes that she  ran out of it. She also asks that we take off metformin off of her medication list. She is not on anticoagulation.     Care Everywhere records reviewed.  Patient was hospitalized from  for 2/21-2/26/2024 -hospitalized at [facility] for acute urinary retention complicated by catheter associated urinary tract infection.  She was also noted to have severe constipation.  She was treated with 4 L of Nulytely prep and lactulose enema but continued to have significant stool burden.  This was felt to be consistent with constipation secondary to opiate use so she received 12 mg of IM Relistor  But again, continued presence of stool on x-ray.  Her acute urinary retention resolved following Rossi catheter placement.  3 days after that Rossi catheter was placed she had had increased  discomfort with suprapubic tenderness, urine culture grew out gram-negative bacilli.  Her Rossi catheter was removed and she had trial of void.  She was prescribed 5-day course of cefdinir.    Patient is followed by [facility].  She was discharged with instructions to schedule a voiding trial with Marcelo Pickett PA-C or Dr. Rivera    Past Medical History  Past Medical History:   Diagnosis Date    Anemia     Anxiety     Cold sore     Depressive disorder     Diabetes (H)     Type 2 DM/No Insulin     E. coli UTI 11/19/2022    Encephalopathy     Esophageal varices without bleeding (H)     grade I    History of blood transfusion     10/2019    History of seizure     diabetic seizure    Infertility management 11/05/2015    Insomnia     Liver cirrhosis secondary to CUETO (H) 05/28/2020    Migraine     Pleural effusion     Thrombocytopenia (H24)     Thyroid disease      Past Surgical History:   Procedure Laterality Date    APPENDECTOMY      1989    BENCH LIVER N/A 11/2/2022    Procedure: Bench liver;  Surgeon: Delbert Morgan MD;  Location: [facility] OR    COLONOSCOPY      1/2020 at [facility]     ENT SURGERY  [facility]     tempanoplasty    GI SURGERY      EGD 2020 at [facility]     GYN SURGERY  2010    laparoscopic ablation    IR PARACENTESIS  2020    IR THORACENTESIS  2022    IR THORACENTESIS  2022    IR THORACENTESIS  2021    IR THORACENTESIS  2021    IR THORACENTESIS  2021    IR THORACENTESIS  2021    IR THORACENTESIS  2021    IR THORACENTESIS  2021    IR THORACENTESIS  10/29/2021    IR THORACENTESIS  10/28/2021    IR THORACENTESIS  10/27/2021    IR THORACENTESIS  10/24/2021    IR THORACENTESIS  10/22/2021    IR THORACENTESIS  10/5/2021    IR THORACENTESIS  2021    IR THORACENTESIS  2021    IR THORACENTESIS  2021    IR THORACENTESIS  2021    IR THORACENTESIS  2021    IR THORACENTESIS  2021    IR THORACENTESIS  8/10/2021    IR THORACENTESIS  2021    IR THORACENTESIS  2021    IR THORACENTESIS  2021    IR THORACENTESIS  2021    IR THORACENTESIS  2021    IR THORACENTESIS  3/31/2021    IR THORACENTESIS  2020    IR TRANSVEN INTRAHEPATIC PORTOSYST SHUNT  2021    MAMMOPLASTY REDUCTION BILATERAL      CA STAB PHELBECTOMY VARICOSE VEINS, LESS THAN 10 INCISIONS, ONE EXTREMITY      RNY gastric bypass  2006    retoocolic, retrogastric, [facility]    TONSILLECTOMY & ADENOIDECTOMY Bilateral     TRANSPLANT LIVER RECIPIENT  DONOR N/A 2022    Procedure: TRANSPLANT, LIVER, RECIPIENT,  DONOR;  Surgeon: Delbert Morgan MD;  Location: [facility] OR    WISDOM TEETH EXTRACTION        magnesium glycinate lysinate chelate (DOCTOR'S BEST) 100 MG TABS tablet  acetaminophen (TYLENOL) 325 MG tablet  acyclovir (ZOVIRAX) 400 MG tablet  aspirin (ASA) 81 MG chewable tablet  calcium carbonate-vitamin D (OSCAL) 500-5 MG-MCG tablet  Continuous Blood Gluc  (DEXCOM G6 ) LUNA  Continuous Blood Gluc Sensor (DEXCOM G6 SENSOR) MISC  Continuous Blood Gluc Transmit (DEXCOM G6 TRANSMITTER) MISC  cyanocobalamin  "(VITAMIN B-12) 1000 MCG tablet  diazepam (VALIUM) 5 MG tablet  docusate sodium (DSS) 100 MG capsule  escitalopram (LEXAPRO) 20 MG tablet  fludrocortisone (FLORINEF) 0.1 MG tablet  folic acid (FOLVITE) 1 MG tablet  gabapentin (NEURONTIN) 100 MG capsule  levothyroxine (SYNTHROID/LEVOTHROID) 125 MCG tablet  LORazepam (ATIVAN) 0.5 MG tablet  melatonin 5 MG tablet  metoclopramide (REGLAN) 5 MG tablet  ondansetron (ZOFRAN ODT) 8 MG ODT tab  tacrolimus (GENERIC) 1 MG capsule  topiramate (TOPAMAX) 25 MG tablet  traZODone (DESYREL) 50 MG tablet      Allergies   Allergen Reactions    Methylprednisolone Hives     Per patient, reaction occurred in 1999 or earlier. Broke out in round, flat hives in neck and chest area. No shortness of breath or swelling. Unknown if reaction occurred immediately after administration.     Adhesive Tape Itching    Oxycodone      slurring    Droperidol Anxiety    Nsaids Other (See Comments)     GI bleed.    Prednisone Anxiety, Hives and Rash     Per patient she has tolerated this since     Family History  Family History   Problem Relation Age of Onset    Anesthesia Reaction Nephew         PONV    Bleeding Disorder No family hx of     Clotting Disorder No family hx of      Social History   Social History     Tobacco Use    Smoking status: Never    Smokeless tobacco: Never   Vaping Use    Vaping Use: Never used   Substance Use Topics    Alcohol use: Not Currently     Comment: Last drink was in 2017    Drug use: Never         A medically appropriate review of systems was performed with pertinent positives and negatives noted in the HPI, and all other systems negative.    Physical Exam   BP: 135/87  Pulse: 76  Temp: 98.5  F (36.9  C)  Resp: 20  Height: 167.6 cm (5' 6\")  Weight: 68 kg (150 lb)  SpO2: 97 %  Physical Exam  Vitals and nursing note reviewed.   Constitutional:       General: She is not in acute distress.     Appearance: Normal appearance. She is well-developed.   HENT:      Head: " Normocephalic and atraumatic.   Eyes:      General: No scleral icterus.     Conjunctiva/sclera: Conjunctivae normal.   Cardiovascular:      Rate and Rhythm: Normal rate.   Pulmonary:      Effort: Pulmonary effort is normal. No respiratory distress.   Abdominal:      General: Abdomen is flat.   Musculoskeletal:      Cervical back: Normal range of motion and neck supple.   Skin:     General: Skin is warm and dry.      Findings: No rash.   Neurological:      Mental Status: She is alert and oriented to person, place, and time.           ED Course, Procedures, & Data      Procedures              Results for orders placed or performed during the hospital encounter of 02/29/24   CT Lumbar Spine w/o Contrast     Status: None    Narrative    EXAM: CT LUMBAR SPINE W/O CONTRAST  2/29/2024 7:52 PM     HISTORY: trauma       COMPARISON:  Abdomen CT 1/18/2023    TECHNIQUE: Using multidetector thin collimation helical acquisition  technique, axial, sagittal and coronal 3 mm thickness CT  reconstructions were obtained through the lumbar spine without  intravenous contrast.  Images were viewed in bone and soft tissue  windows.    FINDINGS:  There are 5 lumbar type vertebrae, used for the purposes of this  dictation. Normal vertebral body alignment. No significant disc space  narrowing. No acute fracture. No suspicious osseous lesion. Mild  multilevel facet arthropathy.    Findings on a level by level basis are as follows:    L1-L2: No spinal canal or neural foraminal stenosis    L2-L3: No spinal canal or neural foraminal stenosis.    L3-L4: No spinal canal or neural foraminal stenosis.    L4-L5: No spinal canal or neural foraminal stenosis.    L5-S1: No spinal canal or neural foraminal stenosis.    Paraspinal muscle atrophy.      Impression    IMPRESSION:  1. No acute fracture or traumatic subluxation.  2. Mild multilevel lumbar spondylosis. No significant spinal canal or  neural foraminal stenosis at any level.    I have personally  reviewed the examination and initial interpretation  and I agree with the findings.    DEBORAH LUCERO MD         SYSTEM ID:  [id number]   CT Pelvis Bone wo Contrast     Status: None    Narrative    EXAM: CT PELVIS BONE WO CONTRAST  LOCATION: [facility]  DATE: 2/29/2024    INDICATION: Trauma and pain, please protocol through both hips to prox 1/3 of femur.  COMPARISON: None.  TECHNIQUE: CT scan of the pelvis was performed without IV contrast. Multiplanar reformats were obtained. Dose reduction techniques were used.  CONTRAST: None.    FINDINGS:    PELVIS ORGANS: Moderate to marked distention of the urinary bladder. There is gas in the urinary bladder.    MUSCULOSKELETAL:     BONES AND JOINTS: No evidence of fracture or effusion. No malalignment.    MUSCLES AND TENDONS: No muscle atrophy. No gross tendon pathology.    SOFT TISSUES: No hematoma, cyst or mass. No edema.      Impression    IMPRESSION:  1.  Moderate to marked distention of the urinary bladder. There is gas in the urinary bladder. This could be related to recent catheterization or infection so clinical correlation is needed.  2.  Otherwise negative. No evidence of fracture.     Basic metabolic panel     Status: Abnormal   Result Value Ref Range    Sodium 136 135 - 145 mmol/L    Potassium 4.3 3.4 - 5.3 mmol/L    Chloride 101 98 - 107 mmol/L    Carbon Dioxide (CO2) 24 22 - 29 mmol/L    Anion Gap 11 7 - 15 mmol/L    Urea Nitrogen 16.8 6.0 - 20.0 mg/dL    Creatinine 1.45 (H) 0.51 - 0.95 mg/dL    GFR Estimate 43 (L) >60 mL/min/1.73m2    Calcium 8.5 (L) 8.6 - 10.0 mg/dL    Glucose 128 (H) 70 - 99 mg/dL   Erythrocyte sedimentation rate auto     Status: Normal   Result Value Ref Range    Erythrocyte Sedimentation Rate 23 0 - 30 mm/hr   CRP inflammation     Status: Normal   Result Value Ref Range    CRP Inflammation <3.00 <5.00 mg/L   UA with Microscopic reflex to Culture     Status: Abnormal    Specimen: Urine, NOS    Result Value Ref Range    Color Urine Dark Yellow (A) Colorless, Straw, Light Yellow, Yellow    Appearance Urine Clear Clear    Glucose Urine Negative Negative mg/dL    Bilirubin Urine Negative Negative    Ketones Urine Negative Negative mg/dL    Specific Gravity Urine 1.009 1.003 - 1.035    Blood Urine Negative Negative    pH Urine 5.5 5.0 - 7.0    Protein Albumin Urine Negative Negative mg/dL    Urobilinogen Urine Normal Normal, 2.0 mg/dL    Nitrite Urine Positive (A) Negative    Leukocyte Esterase Urine Negative Negative    Bacteria Urine Few (A) None Seen /HPF    RBC Urine 0 <=2 /HPF    WBC Urine <1 <=5 /HPF    Squamous Epithelials Urine 1 <=1 /HPF    Transitional Epithelials Urine <1 <=1 /HPF    Narrative    Urine Culture ordered based on laboratory criteria   CBC with platelets and differential     Status: None   Result Value Ref Range    WBC Count 7.8 4.0 - 11.0 10e3/uL    RBC Count 3.98 3.80 - 5.20 10e6/uL    Hemoglobin 12.4 11.7 - 15.7 g/dL    Hematocrit 38.8 35.0 - 47.0 %    MCV 98 78 - 100 fL    MCH 31.2 26.5 - 33.0 pg    MCHC 32.0 31.5 - 36.5 g/dL    RDW 12.8 10.0 - 15.0 %    Platelet Count 246 150 - 450 10e3/uL    % Neutrophils 72 %    % Lymphocytes 20 %    % Monocytes 5 %    % Eosinophils 2 %    % Basophils 1 %    % Immature Granulocytes 0 %    NRBCs per 100 WBC 0 <1 /100    Absolute Neutrophils 5.7 1.6 - 8.3 10e3/uL    Absolute Lymphocytes 1.6 0.8 - 5.3 10e3/uL    Absolute Monocytes 0.4 0.0 - 1.3 10e3/uL    Absolute Eosinophils 0.2 0.0 - 0.7 10e3/uL    Absolute Basophils 0.1 0.0 - 0.2 10e3/uL    Absolute Immature Granulocytes 0.0 <=0.4 10e3/uL    Absolute NRBCs 0.0 10e3/uL   CBC with platelets differential     Status: None    Narrative    The following orders were created for panel order CBC with platelets differential.  Procedure                               Abnormality         Status                     ---------                               -----------         ------                     CBC  with platelets and d...[[id number]]                      Final result                 Please view results for these tests on the individual orders.     Medications   cefTRIAXone (ROCEPHIN) 2 g vial to attach to  ml bag for ADULTS or NS 50 ml bag for PEDS (2 g Intravenous $New Bag 2/29/24 2133)   tacrolimus (GENERIC) capsule 2 mg (has no administration in time range)   HYDROmorphone (DILAUDID) tablet 4 mg (4 mg Oral $Given 2/29/24 2132)   LORazepam (ATIVAN) tablet 1 mg (1 mg Oral $Given 2/29/24 2131)     Labs Ordered and Resulted from Time of ED Arrival to Time of ED Departure   BASIC METABOLIC PANEL - Abnormal       Result Value    Sodium 136      Potassium 4.3      Chloride 101      Carbon Dioxide (CO2) 24      Anion Gap 11      Urea Nitrogen 16.8      Creatinine 1.45 (*)     GFR Estimate 43 (*)     Calcium 8.5 (*)     Glucose 128 (*)    ROUTINE UA WITH MICROSCOPIC REFLEX TO CULTURE - Abnormal    Color Urine Dark Yellow (*)     Appearance Urine Clear      Glucose Urine Negative      Bilirubin Urine Negative      Ketones Urine Negative      Specific Gravity Urine 1.009      Blood Urine Negative      pH Urine 5.5      Protein Albumin Urine Negative      Urobilinogen Urine Normal      Nitrite Urine Positive (*)     Leukocyte Esterase Urine Negative      Bacteria Urine Few (*)     RBC Urine 0      WBC Urine <1      Squamous Epithelials Urine 1      Transitional Epithelials Urine <1     ERYTHROCYTE SEDIMENTATION RATE AUTO - Normal    Erythrocyte Sedimentation Rate 23     CRP INFLAMMATION - Normal    CRP Inflammation <3.00     CBC WITH PLATELETS AND DIFFERENTIAL    WBC Count 7.8      RBC Count 3.98      Hemoglobin 12.4      Hematocrit 38.8      MCV 98      MCH 31.2      MCHC 32.0      RDW 12.8      Platelet Count 246      % Neutrophils 72      % Lymphocytes 20      % Monocytes 5      % Eosinophils 2      % Basophils 1      % Immature Granulocytes 0      NRBCs per 100 WBC 0      Absolute Neutrophils 5.7      Absolute  Lymphocytes 1.6      Absolute Monocytes 0.4      Absolute Eosinophils 0.2      Absolute Basophils 0.1      Absolute Immature Granulocytes 0.0      Absolute NRBCs 0.0     URINE CULTURE     CT Pelvis Bone wo Contrast   Final Result   IMPRESSION:   1.  Moderate to marked distention of the urinary bladder. There is gas in the urinary bladder. This could be related to recent catheterization or infection so clinical correlation is needed.   2.  Otherwise negative. No evidence of fracture.         CT Lumbar Spine w/o Contrast   Final Result   IMPRESSION:   1. No acute fracture or traumatic subluxation.   2. Mild multilevel lumbar spondylosis. No significant spinal canal or   neural foraminal stenosis at any level.      I have personally reviewed the examination and initial interpretation   and I agree with the findings.      DEBORAH LUCERO MD            SYSTEM ID:  [id number]      Lumbar spine MRI w/o contrast    (Results Pending)          Critical care was not performed.     Medical Decision Making  The patient's presentation was of moderate complexity (a chronic illness mild to moderate exacerbation, progression, or side effect of treatment).    The patient's evaluation involved:  ordering and/or review of 3+ test(s) in this encounter (see separate area of note for details)    The patient's management necessitated high risk (a parenteral controlled substance).    Assessment & Plan    Susan Puckett is a 53 year old female with history of chronic pain on opiate pain management, and ESLD s/p liver transplant in 2022, type 2 diabetes, as well as recent hospitalization for hypoglycemia induced seizure like spell (resulting in ground-level fall with multiple rib fractures, subarachnoid hemorrhage) as well as hospitalization for CAUTI, CKD stage III who presents to the emergency department with right gluteal pain for the past 3 weeks as well as rectal, urethral pain for the past 2 weeks.  Vital signs stable and afebrile including  normal pulse ox at 97% on room air.  Labs drawn and sent revealed normal CBC and electrolytes, normal inflammatory markers, bland UA.  Patient is given Ativan and Dilaudid for anxiolysis and analgesia sent to MRI for imaging of the lumbar spine and CT for imaging, lumbar spine and pelvis, all imaging was negative for any acute traumatic injury or evidence explaining patient's symptoms.  Patient was given a dose of her home tacrolimus prior to discharge plan to follow-up with primary care provider.    I have reviewed the nursing notes. I have reviewed the findings, diagnosis, plan and need for follow up with the patient.    New Prescriptions    No medications on file       Final diagnoses:   Chronic constipation     I, Donya Jacobo, am serving as a trained medical scribe to document services personally performed by Roger Alejandre MD based on the provider's statements to me on February 29, 2024.  This document has been checked and approved by the attending provider.    I, Roger Alejandre MD, was physically present and have reviewed and verified the accuracy of this note documented by Donya Jacobo, medical scribe.      Roger Alejandre MD   [facility] EMERGENCY DEPARTMENT  2/29/2024     Roger Alejandre MD  03/07/24 0923

## 2024-02-29 NOTE — ED TRIAGE NOTES
Ambulatory from clinic in [place], pt endorses pain to Right inner buttock radiating to her groin. Onset of the pain was 2/9 from a fall. Also reported increasing difficulty urinating the last couple of weeks.

## 2024-02-29 NOTE — TELEPHONE ENCOUNTER
Susan calling to make sure she is ok to take another 7 days of pyridium. Having an MRI to r/o cauda equina related to her bladder and bowel symptoms, per PCP.

## 2024-03-01 RX ORDER — FLUCONAZOLE 100 MG/1
100 TABLET ORAL ONCE
Status: DISCONTINUED | OUTPATIENT
Start: 2024-03-01 | End: 2024-03-01 | Stop reason: HOSPADM

## 2024-03-01 NOTE — DISCHARGE INSTRUCTIONS
Please make an appointment [facility] (phone: [phone]) as soon as possible. Please request to see an Osteopathic doctor who does myofascial release.

## 2024-03-02 ENCOUNTER — HOSPITAL ENCOUNTER (OUTPATIENT)
Facility: CLINIC | Age: 54
Setting detail: OBSERVATION
Discharge: HOME | End: 2024-03-11
Attending: EMERGENCY MEDICINE | Admitting: STUDENT IN AN ORGANIZED HEALTH CARE EDUCATION/TRAINING PROGRAM
Payer: COMMERCIAL

## 2024-03-02 ENCOUNTER — APPOINTMENT (OUTPATIENT)
Dept: MRI IMAGING | Facility: CLINIC | Age: 54
End: 2024-03-02
Attending: EMERGENCY MEDICINE
Payer: COMMERCIAL

## 2024-03-02 ENCOUNTER — APPOINTMENT (OUTPATIENT)
Dept: GENERAL RADIOLOGY | Facility: CLINIC | Age: 54
End: 2024-03-02
Payer: COMMERCIAL

## 2024-03-02 DIAGNOSIS — R10.9 ABDOMINAL PAIN, UNSPECIFIED ABDOMINAL LOCATION: ICD-10-CM

## 2024-03-02 DIAGNOSIS — R33.9 URINARY RETENTION: ICD-10-CM

## 2024-03-02 DIAGNOSIS — K59.00 CONSTIPATION, UNSPECIFIED CONSTIPATION TYPE: ICD-10-CM

## 2024-03-02 DIAGNOSIS — R10.2 PELVIC PAIN IN FEMALE: ICD-10-CM

## 2024-03-02 LAB
BACTERIA UR CULT: NORMAL
CK SERPL-CCNC: 64 U/L (ref 26–192)
CREAT SERPL-MCNC: 1.36 MG/DL (ref 0.51–0.95)
CRP SERPL-MCNC: <3 MG/L
EGFRCR SERPLBLD CKD-EPI 2021: 47 ML/MIN/1.73M2
GLUCOSE BLDC GLUCOMTR-MCNC: 148 MG/DL (ref 70–99)
HOLD SPECIMEN: NORMAL

## 2024-03-02 PROCEDURE — 36415 COLL VENOUS BLD VENIPUNCTURE: CPT | Performed by: EMERGENCY MEDICINE

## 2024-03-02 PROCEDURE — 250N000013 HC RX MED GY IP 250 OP 250 PS 637

## 2024-03-02 PROCEDURE — 96372 THER/PROPH/DIAG INJ SC/IM: CPT | Performed by: EMERGENCY MEDICINE

## 2024-03-02 PROCEDURE — 999N000127 HC STATISTIC PERIPHERAL IV START W US GUIDANCE

## 2024-03-02 PROCEDURE — 120N000002 HC R&B MED SURG/OB UMMC

## 2024-03-02 PROCEDURE — 74018 RADEX ABDOMEN 1 VIEW: CPT

## 2024-03-02 PROCEDURE — 72195 MRI PELVIS W/O DYE: CPT | Mod: 26 | Performed by: RADIOLOGY

## 2024-03-02 PROCEDURE — 74018 RADEX ABDOMEN 1 VIEW: CPT | Mod: 26 | Performed by: STUDENT IN AN ORGANIZED HEALTH CARE EDUCATION/TRAINING PROGRAM

## 2024-03-02 PROCEDURE — 250N000012 HC RX MED GY IP 250 OP 636 PS 637

## 2024-03-02 PROCEDURE — 82565 ASSAY OF CREATININE: CPT

## 2024-03-02 PROCEDURE — 99285 EMERGENCY DEPT VISIT HI MDM: CPT | Mod: 25 | Performed by: EMERGENCY MEDICINE

## 2024-03-02 PROCEDURE — 250N000013 HC RX MED GY IP 250 OP 250 PS 637: Performed by: EMERGENCY MEDICINE

## 2024-03-02 PROCEDURE — 72195 MRI PELVIS W/O DYE: CPT

## 2024-03-02 PROCEDURE — 99223 1ST HOSP IP/OBS HIGH 75: CPT | Mod: FS | Performed by: INTERNAL MEDICINE

## 2024-03-02 PROCEDURE — 96360 HYDRATION IV INFUSION INIT: CPT | Performed by: EMERGENCY MEDICINE

## 2024-03-02 PROCEDURE — 258N000003 HC RX IP 258 OP 636: Performed by: EMERGENCY MEDICINE

## 2024-03-02 PROCEDURE — 99207 PR APP CREDIT; MD BILLING SHARED VISIT: CPT

## 2024-03-02 PROCEDURE — 82550 ASSAY OF CK (CPK): CPT

## 2024-03-02 PROCEDURE — 51798 US URINE CAPACITY MEASURE: CPT | Performed by: EMERGENCY MEDICINE

## 2024-03-02 PROCEDURE — 96361 HYDRATE IV INFUSION ADD-ON: CPT | Performed by: EMERGENCY MEDICINE

## 2024-03-02 PROCEDURE — 96374 THER/PROPH/DIAG INJ IV PUSH: CPT

## 2024-03-02 PROCEDURE — 99285 EMERGENCY DEPT VISIT HI MDM: CPT | Performed by: EMERGENCY MEDICINE

## 2024-03-02 PROCEDURE — 86140 C-REACTIVE PROTEIN: CPT

## 2024-03-02 PROCEDURE — 250N000011 HC RX IP 250 OP 636: Mod: JZ | Performed by: EMERGENCY MEDICINE

## 2024-03-02 RX ORDER — TOPIRAMATE 50 MG/1
100 TABLET, FILM COATED ORAL AT BEDTIME
COMMUNITY
Start: 2024-02-06

## 2024-03-02 RX ORDER — POLYETHYLENE GLYCOL 3350 17 G/17G
17 POWDER, FOR SOLUTION ORAL 2 TIMES DAILY
Status: DISCONTINUED | OUTPATIENT
Start: 2024-03-02 | End: 2024-03-11 | Stop reason: HOSPADM

## 2024-03-02 RX ORDER — HYDROXYZINE HYDROCHLORIDE 50 MG/1
50 TABLET, FILM COATED ORAL EVERY 6 HOURS PRN
Status: DISCONTINUED | OUTPATIENT
Start: 2024-03-02 | End: 2024-03-11 | Stop reason: HOSPADM

## 2024-03-02 RX ORDER — GABAPENTIN 300 MG/1
300 CAPSULE ORAL ONCE
Status: COMPLETED | OUTPATIENT
Start: 2024-03-02 | End: 2024-03-02

## 2024-03-02 RX ORDER — AMOXICILLIN 250 MG
2 CAPSULE ORAL 2 TIMES DAILY PRN
Status: DISCONTINUED | OUTPATIENT
Start: 2024-03-02 | End: 2024-03-11 | Stop reason: HOSPADM

## 2024-03-02 RX ORDER — GABAPENTIN 300 MG/1
300 CAPSULE ORAL 3 TIMES DAILY
Status: DISCONTINUED | OUTPATIENT
Start: 2024-03-02 | End: 2024-03-11 | Stop reason: HOSPADM

## 2024-03-02 RX ORDER — BISACODYL 10 MG
20 SUPPOSITORY, RECTAL RECTAL ONCE
Status: COMPLETED | OUTPATIENT
Start: 2024-03-02 | End: 2024-03-02

## 2024-03-02 RX ORDER — FOLIC ACID 1 MG/1
1 TABLET ORAL EVERY MORNING
Status: DISCONTINUED | OUTPATIENT
Start: 2024-03-03 | End: 2024-03-11 | Stop reason: HOSPADM

## 2024-03-02 RX ORDER — MORPHINE SULFATE 4 MG/ML
4 INJECTION, SOLUTION INTRAMUSCULAR; INTRAVENOUS ONCE
Status: COMPLETED | OUTPATIENT
Start: 2024-03-02 | End: 2024-03-02

## 2024-03-02 RX ORDER — CALCIUM CARBONATE 500 MG/1
1000 TABLET, CHEWABLE ORAL 4 TIMES DAILY PRN
Status: DISCONTINUED | OUTPATIENT
Start: 2024-03-02 | End: 2024-03-11 | Stop reason: HOSPADM

## 2024-03-02 RX ORDER — OXYBUTYNIN CHLORIDE 5 MG/1
5 TABLET ORAL DAILY
Status: ON HOLD | COMMUNITY
Start: 2024-02-24 | End: 2024-03-04

## 2024-03-02 RX ORDER — OXYBUTYNIN CHLORIDE 5 MG/1
5 TABLET ORAL DAILY
Status: DISCONTINUED | OUTPATIENT
Start: 2024-03-03 | End: 2024-03-11 | Stop reason: HOSPADM

## 2024-03-02 RX ORDER — TOPIRAMATE 25 MG/1
100 TABLET, FILM COATED ORAL AT BEDTIME
Status: DISCONTINUED | OUTPATIENT
Start: 2024-03-02 | End: 2024-03-11 | Stop reason: HOSPADM

## 2024-03-02 RX ORDER — FLUDROCORTISONE ACETATE 0.1 MG/1
0.1 TABLET ORAL DAILY
Status: DISCONTINUED | OUTPATIENT
Start: 2024-03-03 | End: 2024-03-02

## 2024-03-02 RX ORDER — HYDROXYZINE HYDROCHLORIDE 25 MG/1
25 TABLET, FILM COATED ORAL EVERY 6 HOURS PRN
Status: DISCONTINUED | OUTPATIENT
Start: 2024-03-02 | End: 2024-03-11 | Stop reason: HOSPADM

## 2024-03-02 RX ORDER — LEVOTHYROXINE SODIUM 125 UG/1
125 TABLET ORAL
Status: DISCONTINUED | OUTPATIENT
Start: 2024-03-03 | End: 2024-03-11 | Stop reason: HOSPADM

## 2024-03-02 RX ORDER — SENNOSIDES 8.6 MG
8.6 TABLET ORAL 2 TIMES DAILY
Status: DISCONTINUED | OUTPATIENT
Start: 2024-03-02 | End: 2024-03-11 | Stop reason: HOSPADM

## 2024-03-02 RX ORDER — LIDOCAINE 40 MG/G
CREAM TOPICAL
Status: DISCONTINUED | OUTPATIENT
Start: 2024-03-02 | End: 2024-03-11 | Stop reason: HOSPADM

## 2024-03-02 RX ORDER — TOPIRAMATE 25 MG/1
50 TABLET, FILM COATED ORAL
Status: DISCONTINUED | OUTPATIENT
Start: 2024-03-02 | End: 2024-03-02

## 2024-03-02 RX ORDER — LIDOCAINE HYDROCHLORIDE 20 MG/ML
JELLY TOPICAL EVERY 4 HOURS PRN
Status: DISCONTINUED | OUTPATIENT
Start: 2024-03-02 | End: 2024-03-11 | Stop reason: HOSPADM

## 2024-03-02 RX ORDER — ESCITALOPRAM OXALATE 10 MG/1
30 TABLET ORAL DAILY
Status: DISCONTINUED | OUTPATIENT
Start: 2024-03-03 | End: 2024-03-03

## 2024-03-02 RX ORDER — AMOXICILLIN 250 MG
1 CAPSULE ORAL 2 TIMES DAILY PRN
Status: DISCONTINUED | OUTPATIENT
Start: 2024-03-02 | End: 2024-03-11 | Stop reason: HOSPADM

## 2024-03-02 RX ORDER — METHOCARBAMOL 750 MG/1
750 TABLET, FILM COATED ORAL 4 TIMES DAILY
Status: DISCONTINUED | OUTPATIENT
Start: 2024-03-02 | End: 2024-03-03

## 2024-03-02 RX ORDER — DIAZEPAM 5 MG
5 TABLET ORAL ONCE
Status: COMPLETED | OUTPATIENT
Start: 2024-03-02 | End: 2024-03-02

## 2024-03-02 RX ORDER — ASPIRIN 81 MG/1
81 TABLET, CHEWABLE ORAL DAILY
Status: DISCONTINUED | OUTPATIENT
Start: 2024-03-03 | End: 2024-03-02

## 2024-03-02 RX ORDER — TACROLIMUS 1 MG/1
2 CAPSULE ORAL
Status: DISCONTINUED | OUTPATIENT
Start: 2024-03-02 | End: 2024-03-11 | Stop reason: HOSPADM

## 2024-03-02 RX ADMIN — BISACODYL 20 MG: 10 SUPPOSITORY RECTAL at 11:29

## 2024-03-02 RX ADMIN — TOPIRAMATE 100 MG: 25 TABLET, FILM COATED ORAL at 22:54

## 2024-03-02 RX ADMIN — GABAPENTIN 300 MG: 300 CAPSULE ORAL at 19:03

## 2024-03-02 RX ADMIN — Medication 25 MG: at 22:54

## 2024-03-02 RX ADMIN — MORPHINE SULFATE 4 MG: 4 INJECTION INTRAVENOUS at 16:29

## 2024-03-02 RX ADMIN — METHYLNALTREXONE BROMIDE 12 MG: 12 INJECTION, SOLUTION SUBCUTANEOUS at 12:01

## 2024-03-02 RX ADMIN — METHOCARBAMOL 750 MG: 750 TABLET ORAL at 22:49

## 2024-03-02 RX ADMIN — TACROLIMUS 2 MG: 1 CAPSULE ORAL at 22:54

## 2024-03-02 RX ADMIN — SODIUM CHLORIDE 1000 ML: 9 INJECTION, SOLUTION INTRAVENOUS at 11:26

## 2024-03-02 RX ADMIN — Medication 1 TABLET: at 22:47

## 2024-03-02 RX ADMIN — SENNOSIDES 8.6 MG: 8.6 TABLET, FILM COATED ORAL at 22:47

## 2024-03-02 RX ADMIN — GABAPENTIN 300 MG: 300 CAPSULE ORAL at 11:29

## 2024-03-02 RX ADMIN — POLYETHYLENE GLYCOL 3350 17 G: 17 POWDER, FOR SOLUTION ORAL at 22:47

## 2024-03-02 RX ADMIN — DIAZEPAM 5 MG: 5 TABLET ORAL at 11:29

## 2024-03-02 ASSESSMENT — ACTIVITIES OF DAILY LIVING (ADL)
ADLS_ACUITY_SCORE: 43
ADLS_ACUITY_SCORE: 41
ADLS_ACUITY_SCORE: 43
ADLS_ACUITY_SCORE: 44
ADLS_ACUITY_SCORE: 43
ADLS_ACUITY_SCORE: 44
ADLS_ACUITY_SCORE: 43
ADLS_ACUITY_SCORE: 43

## 2024-03-02 ASSESSMENT — COLUMBIA-SUICIDE SEVERITY RATING SCALE - C-SSRS
6. HAVE YOU EVER DONE ANYTHING, STARTED TO DO ANYTHING, OR PREPARED TO DO ANYTHING TO END YOUR LIFE?: NO
2. HAVE YOU ACTUALLY HAD ANY THOUGHTS OF KILLING YOURSELF IN THE PAST MONTH?: NO
1. IN THE PAST MONTH, HAVE YOU WISHED YOU WERE DEAD OR WISHED YOU COULD GO TO SLEEP AND NOT WAKE UP?: NO

## 2024-03-02 NOTE — ED TRIAGE NOTES
Patient presents to triage for evaluation of constipation. Patient reports that she is having burning in her vaginal area d/t yeast exam. Patient also reports that she has hemorrhoids and provider told her she may have a hematoma. Patient is requesting a vaginal and rectal exam.      Triage Assessment (Adult)       Row Name 03/02/24 1043          Triage Assessment    Airway WDL WDL        Respiratory WDL    Respiratory WDL WDL        Skin Circulation/Temperature WDL    Skin Circulation/Temperature WDL WDL        Cardiac WDL    Cardiac WDL WDL        Peripheral/Neurovascular WDL    Peripheral Neurovascular WDL WDL        Cognitive/Neuro/Behavioral WDL    Cognitive/Neuro/Behavioral WDL WDL

## 2024-03-02 NOTE — ED PROVIDER NOTES
"  ED PROVIDER NOTE  March 2, 2024  History     Chief Complaint   Patient presents with    Constipation     HPI  Susan Puckett is a 53 year old female who has a quite complex past medical history.  She presents today to the emergency department primarily for evaluation of severe persistent right perineal pain.  She reports this all started several days after her initial fall.  The patient was evaluated earlier this past month after a fall where she sustained rib fractures, subarachnoid bleed and likely seizure.  She reports that since that time she has had fairly persistent severe debilitating pain in the region of the perineum.  The patient arrives and did call previous with crying and increased anxiety.  She states this is debilitating she cannot even sit down.  She has had multiple evaluations including recent hospitalization with management of severe constipation related to opiate use.  She states she has continued to use opiates as this is severe.  She has attempted bowel regimen, home enemas, Relistor while in the hospital.  She states she was evaluated and noted to have hemorrhoids and was told that these could lead to a \"hematoma\".  She reports she has been urinating adequately.  She denies similar symptoms prior to her fall.  She was evaluated several days ago in the emergency department states nothing was done.  At that time she did have laboratory studies and CT imaging along with MRI of the lumbar back demonstrate no obvious abnormality.  She did states she had a colorectal surgery evaluation who felt she did not need an intervention with her hemorrhoids.  She is also on gabapentin at 100 mg 3 times daily.  She states previously she has been upwards of 900 mg daily.  She denies dysuria or urinary frequency but has some concern that the catheter was causing burning sensation when and with recent hospitalization secondary to urinary retention.      Past Medical History  Past Medical History:   Diagnosis " Date    Anemia     Anxiety     Cold sore     Depressive disorder     Diabetes (H)     Type 2 DM/No Insulin     E. coli UTI 11/19/2022    Encephalopathy     Esophageal varices without bleeding (H)     grade I    History of blood transfusion     10/2019    History of seizure     diabetic seizure    Infertility management 11/05/2015    Insomnia     Liver cirrhosis secondary to CUETO (H) 05/28/2020    Migraine     Pleural effusion     Thrombocytopenia (H24)     Thyroid disease      Past Surgical History:   Procedure Laterality Date    APPENDECTOMY      1989    BENCH LIVER N/A 11/2/2022    Procedure: Bench liver;  Surgeon: Delbert Morgan MD;  Location: [facility] OR    COLONOSCOPY      1/2020 at [facility]     ENT SURGERY  1998    tempanoplasty    GI SURGERY      EGD August 2020 at [facility]     GYN SURGERY  2010    laparoscopic ablation    IR PARACENTESIS  6/8/2020    IR THORACENTESIS  6/6/2022    IR THORACENTESIS  1/11/2022    IR THORACENTESIS  12/2/2021    IR THORACENTESIS  11/29/2021    IR THORACENTESIS  11/23/2021    IR THORACENTESIS  11/12/2021    IR THORACENTESIS  11/4/2021    IR THORACENTESIS  11/1/2021    IR THORACENTESIS  10/29/2021    IR THORACENTESIS  10/28/2021    IR THORACENTESIS  10/27/2021    IR THORACENTESIS  10/24/2021    IR THORACENTESIS  10/22/2021    IR THORACENTESIS  10/5/2021    IR THORACENTESIS  9/22/2021    IR THORACENTESIS  9/19/2021    IR THORACENTESIS  9/17/2021    IR THORACENTESIS  8/31/2021    IR THORACENTESIS  8/17/2021    IR THORACENTESIS  8/13/2021    IR THORACENTESIS  8/10/2021    IR THORACENTESIS  6/24/2021    IR THORACENTESIS  6/19/2021    IR THORACENTESIS  6/17/2021    IR THORACENTESIS  6/16/2021    IR THORACENTESIS  6/13/2021    IR THORACENTESIS  3/31/2021    IR THORACENTESIS  7/30/2020    IR TRANSVEN INTRAHEPATIC PORTOSYST SHUNT  11/5/2021    MAMMOPLASTY REDUCTION BILATERAL  2004    CA STAB PHELBECTOMY VARICOSE VEINS, LESS THAN 10 INCISIONS, ONE EXTREMITY  2020    RNY gastric bypass   2006    retoocolic, retrogastric, [facility]    TONSILLECTOMY & ADENOIDECTOMY Bilateral 1978    TRANSPLANT LIVER RECIPIENT  DONOR N/A 2022    Procedure: TRANSPLANT, LIVER, RECIPIENT,  DONOR;  Surgeon: Delbert Morgan MD;  Location: [facility] OR    WISDOM TEETH EXTRACTION        magnesium glycinate lysinate chelate (DOCTOR'S BEST) 100 MG TABS tablet  acetaminophen (TYLENOL) 325 MG tablet  acyclovir (ZOVIRAX) 400 MG tablet  aspirin (ASA) 81 MG chewable tablet  calcium carbonate-vitamin D (OSCAL) 500-5 MG-MCG tablet  Continuous Blood Gluc  (DEXCOM G6 ) LUNA  Continuous Blood Gluc Sensor (DEXCOM G6 SENSOR) MISC  Continuous Blood Gluc Transmit (DEXCOM G6 TRANSMITTER) MISC  cyanocobalamin (VITAMIN B-12) 1000 MCG tablet  diazepam (VALIUM) 5 MG tablet  docusate sodium (DSS) 100 MG capsule  escitalopram (LEXAPRO) 20 MG tablet  fludrocortisone (FLORINEF) 0.1 MG tablet  folic acid (FOLVITE) 1 MG tablet  gabapentin (NEURONTIN) 100 MG capsule  levothyroxine (SYNTHROID/LEVOTHROID) 125 MCG tablet  LORazepam (ATIVAN) 0.5 MG tablet  melatonin 5 MG tablet  metoclopramide (REGLAN) 5 MG tablet  ondansetron (ZOFRAN ODT) 8 MG ODT tab  tacrolimus (GENERIC) 1 MG capsule  topiramate (TOPAMAX) 25 MG tablet  traZODone (DESYREL) 50 MG tablet      Allergies   Allergen Reactions    Methylprednisolone Hives     Per patient, reaction occurred in  or earlier. Broke out in round, flat hives in neck and chest area. No shortness of breath or swelling. Unknown if reaction occurred immediately after administration.     Adhesive Tape Itching    Oxycodone      slurring    Droperidol Anxiety    Nsaids Other (See Comments)     GI bleed.    Prednisone Anxiety, Hives and Rash     Per patient she has tolerated this since     Family History  Family History   Problem Relation Age of Onset    Anesthesia Reaction Nephew         PONV    Bleeding Disorder No family hx of     Clotting Disorder No family hx of      Social History    Social History     Tobacco Use    Smoking status: Never    Smokeless tobacco: Never   Vaping Use    Vaping Use: Never used   Substance Use Topics    Alcohol use: Not Currently     Comment: Last drink was in 2017    Drug use: Never         A medically appropriate review of systems was performed with pertinent positives and negatives noted in the HPI, and all other systems negative.      Physical Exam   BP: 132/86  Pulse: 85  Temp: 98  F (36.7  C)  Resp: 17  SpO2: 98 %      Physical Exam  Vitals and nursing note reviewed.   Constitutional:       General: She is in acute distress.      Appearance: Normal appearance. She is not ill-appearing, toxic-appearing or diaphoretic.   HENT:      Head: Normocephalic and atraumatic.      Right Ear: External ear normal.      Left Ear: External ear normal.   Eyes:      Extraocular Movements: Extraocular movements intact.      Pupils: Pupils are equal, round, and reactive to light.   Cardiovascular:      Rate and Rhythm: Normal rate.      Pulses: Normal pulses.   Pulmonary:      Effort: Pulmonary effort is normal. No respiratory distress.   Genitourinary:         Comments: External hemorrhoids present.  No blood.  No significant stool within rectal vault.  Normal tone.  Patient localizes pain to the region of the right perineum there is no overlying erythema, rash, palpable fluctuance or induration.  No subcutaneous gas or emphysema appreciated.  Musculoskeletal:         General: No deformity or signs of injury. Normal range of motion.      Cervical back: Normal range of motion.   Skin:     General: Skin is warm.      Capillary Refill: Capillary refill takes less than 2 seconds.      Coloration: Skin is not pale.      Findings: No bruising or erythema.   Neurological:      General: No focal deficit present.      Mental Status: She is alert and oriented to person, place, and time.      Cranial Nerves: No cranial nerve deficit.   Psychiatric:         Mood and Affect: Mood is anxious.  Affect is labile and tearful.         ED Course        Procedures         Results for orders placed or performed during the hospital encounter of 03/02/24 (from the past 24 hour(s))   [place] Draw    Narrative    The following orders were created for panel order [place] Draw.  Procedure                               Abnormality         Status                     ---------                               -----------         ------                     Extra Blue Top Tube[[id number]]                              Final result               Extra Red Top Tube[[id number]]                               Final result               Extra Green Top (Lithium...[[id number]]                      Final result               Extra Purple Top Tube[[id number]]                            Final result                 Please view results for these tests on the individual orders.   Extra Blue Top Tube   Result Value Ref Range    Hold Specimen JIC    Extra Red Top Tube   Result Value Ref Range    Hold Specimen JIC    Extra Green Top (Lithium Heparin) Tube   Result Value Ref Range    Hold Specimen JIC    Extra Purple Top Tube   Result Value Ref Range    Hold Specimen JIC    MR Pelvis Bone wo Contrast    Narrative    EXAM: MR PELVIC BONES W/O CONTRAST  LOCATION: [facility]  DATE: 3/2/2024    INDICATION: Severe pain, debilitating, inability to bear weight.  COMPARISON: None.  TECHNIQUE: Unenhanced.    FINDINGS:     HIPS:   -Both hips are negative for fracture or avascular necrosis. No significant effusion on either side. No gross stable tearing on large field-of-view imaging.    MUSCLES AND TENDONS:  -Gluteal: No tendon tear or tendinopathy. No trochanteric bursitis.    -Proximal hamstring: Left-sided hamstring tendinopathy without tearing.   -Iliopsoas: No tendon tear or tendinopathy. No bursitis.    BONES:   -No fracture or concerning marrow replacing lesion.   -SI joints are normal. Visualized lumbar  spine is normal.    SOFT TISSUES:   -There is some intermediate signal abnormality within the gluteus medius musculature bilaterally which could be due to muscle strain but there is no tearing. There is mild nonspecific edema within the subcutaneous soft tissues lateral to both hips.   Findings are fairly symmetric. No evidence for organized fluid collection.    INTRAPELVIC CONTENTS:   -Visualized portions are normal.      Impression    IMPRESSION:  1.  Both hips are negative for fracture or avascular necrosis. No significant effusion on either side. No evidence for gross labral tearing on large field-of-view imaging.  2.  Bony pelvis negative for fracture or bone marrow signal abnormality.  3.  Mild left-sided hamstring tendinopathy without tearing.  4.  Mild intramuscular signal abnormality within the gluteus medius bilaterally.  5.  Advanced fatty infiltration and atrophy of the paraspinal musculature bilaterally.  6.  Mild edema within the subcutaneous soft tissues lateral to both hips.     *Note: Due to a large number of results and/or encounters for the requested time period, some results have not been displayed. A complete set of results can be found in Results Review.     Medications   morphine (PF) injection 4 mg (has no administration in time range)   gabapentin (NEURONTIN) capsule 300 mg (300 mg Oral $Given 3/2/24 1129)   methylnaltrexone (RELISTOR) injection 12 mg (12 mg Subcutaneous $Given 3/2/24 1201)   diazepam (VALIUM) tablet 5 mg (5 mg Oral $Given 3/2/24 1129)   sodium chloride 0.9% BOLUS 1,000 mL (0 mLs Intravenous Stopped 3/2/24 1231)   bisacodyl (DULCOLAX) suppository 20 mg (20 mg Rectal $Given 3/2/24 1129)             Critical care was not performed.     Medical Decision Making  The patient's presentation was of moderate complexity (an acute complicated injury).    The patient's evaluation involved:  review of external note(s) from 3+ sources (see separate area of note for details)  review of 3+  test result(s) ordered prior to this encounter (see separate area of note for details)  ordering and/or review of 3+ test(s) in this encounter (see separate area of note for details)    The patient's management necessitated high risk (a decision regarding hospitalization).    Assessments & Plan (with Medical Decision Making)     Susan Puckett is a 53 year old female who has a quite complex past medical history.  She presents today to the emergency department primarily for evaluation of severe persistent right perineal pain.  Upon arrival patient initially noted to be standing.  She is intermittently tearful and panting/breathing heavily.  The patient is anxious and appears to be uncomfortable but is nontoxic.  She is speaking in full sentences currently SpO2 at 98% on room air.  No evidence of fever or significant vital sign abnormality.  I did evaluate the patient physically.  She focuses on an area of severe pain in the region of the right perineum.  There is no sign of external lesions, skin rash.  Minimal reproducible pain.  There is no subcutaneous gas, fluctuance or induration.  No sign of localized trauma or ecchymosis.  Rectal examination demonstrates normal tone.  No significant stool within the rectal vault.  She does have multiple hemorrhoids externally which do not appear to be thrombosed.  I have reviewed numerous notes for this patient and laboratory studies including recent hospital dismissal summary with note of symptomatic external hemorrhoids, myofascial pain syndrome, subacute pain with opiate dependence and severe constipation.  I did also review recent CT scans and MRI.  CT scan did demonstrate at that time moderate to severe bladder distention.  Unclear if this is contributing somewhat to symptoms.  This was after catheterization.  I would plan for patient to void and perform postvoid residual bladder scan to ensure no further retention.  Low suspicion for primary spinal cause such as impinged  nerve, spinal stenosis, disc herniation.  She did have MRI imaging pointing away from this as a cause.  CT scan and MRI failed to demonstrate structural lesion such as bowel obstruction, malignancy, bleed.  Etiology at this time is unclear.  I think she would benefit from going back up on her gabapentin at minimum.  Unclear if she has some component of hyperesthesia.  Additionally of note much of symptoms started after patient's fall.  While at that time she had no known trauma to that region she did have a subarachnoid bleed and some component of injury.  I am not sure if this is contributing to current increase in pain and suspect objectively this will not be possible to prove.  I would focus on treatable causes emergently including improving bowel function, ensuring no bladder retention, management of hemorrhoids.  We will attempt to decrease her pain.  She is previously being seen by colorectal surgery.  This does not sound to be gynecologic such as ovarian torsion, ruptured cyst, polyp, or other acute pathology.    I did attempt to treat symptomatically with medications regarding constipation.  Further I did expand imaging with MRI of the pelvis specifically, where the patient is having severe pain.  This does not demonstrate any objective findings explain symptoms.  Again as per above source unclear.  The patient is still tearful stating she cannot live like this.  She says she cannot walk secondary to ongoing severe pain.  Of note as per above based on prior CT several days ago with distention of the bladder I did do a postvoid residual with approximately 300 cc of urine.  At that time patient was in significant pain and tearful.  We did rescan her with persistent retention.  Unclear if patient has had some sort of nerve dysfunction.  Again difficult to prove objectively but reassuring MRI findings.  Plan will be for acute hospitalization with consideration of pain management consultation.      I have  reviewed the nursing notes.    I have reviewed the findings, diagnosis, plan and need for follow up with the patient.    New Prescriptions    No medications on file       Final diagnoses:   Pelvic pain in female   Abdominal pain, unspecified abdominal location   Constipation, unspecified constipation type   Urinary retention       RENZO POWELL MD    3/2/2024   [facility] EMERGENCY DEPARTMENT     Renzo Powell MD  03/02/24 3959

## 2024-03-03 LAB
GLUCOSE BLDC GLUCOMTR-MCNC: 132 MG/DL (ref 70–99)
GLUCOSE BLDC GLUCOMTR-MCNC: 172 MG/DL (ref 70–99)
GLUCOSE BLDC GLUCOMTR-MCNC: 173 MG/DL (ref 70–99)
HOLD SPECIMEN: NORMAL
TACROLIMUS BLD-MCNC: 6.6 UG/L (ref 5–15)
TME LAST DOSE: NORMAL H
TME LAST DOSE: NORMAL H

## 2024-03-03 PROCEDURE — 82962 GLUCOSE BLOOD TEST: CPT

## 2024-03-03 PROCEDURE — 250N000011 HC RX IP 250 OP 636: Performed by: STUDENT IN AN ORGANIZED HEALTH CARE EDUCATION/TRAINING PROGRAM

## 2024-03-03 PROCEDURE — 80197 ASSAY OF TACROLIMUS: CPT

## 2024-03-03 PROCEDURE — 250N000012 HC RX MED GY IP 250 OP 636 PS 637

## 2024-03-03 PROCEDURE — 96375 TX/PRO/DX INJ NEW DRUG ADDON: CPT

## 2024-03-03 PROCEDURE — 250N000013 HC RX MED GY IP 250 OP 250 PS 637: Performed by: INTERNAL MEDICINE

## 2024-03-03 PROCEDURE — 99203 OFFICE O/P NEW LOW 30 MIN: CPT | Mod: GC | Performed by: OBSTETRICS & GYNECOLOGY

## 2024-03-03 PROCEDURE — 36415 COLL VENOUS BLD VENIPUNCTURE: CPT

## 2024-03-03 PROCEDURE — 250N000013 HC RX MED GY IP 250 OP 250 PS 637: Performed by: STUDENT IN AN ORGANIZED HEALTH CARE EDUCATION/TRAINING PROGRAM

## 2024-03-03 PROCEDURE — 250N000013 HC RX MED GY IP 250 OP 250 PS 637

## 2024-03-03 PROCEDURE — G0378 HOSPITAL OBSERVATION PER HR: HCPCS

## 2024-03-03 PROCEDURE — 99233 SBSQ HOSP IP/OBS HIGH 50: CPT | Performed by: STUDENT IN AN ORGANIZED HEALTH CARE EDUCATION/TRAINING PROGRAM

## 2024-03-03 RX ORDER — METHOCARBAMOL 500 MG/1
500 TABLET, FILM COATED ORAL 4 TIMES DAILY
Status: DISCONTINUED | OUTPATIENT
Start: 2024-03-03 | End: 2024-03-11 | Stop reason: HOSPADM

## 2024-03-03 RX ORDER — ACETAMINOPHEN 325 MG/1
650 TABLET ORAL EVERY 4 HOURS PRN
Status: DISCONTINUED | OUTPATIENT
Start: 2024-03-03 | End: 2024-03-11 | Stop reason: HOSPADM

## 2024-03-03 RX ORDER — POLYETHYLENE GLYCOL 3350 17 G/17G
17 POWDER, FOR SOLUTION ORAL DAILY
Status: DISCONTINUED | OUTPATIENT
Start: 2024-03-03 | End: 2024-03-03

## 2024-03-03 RX ORDER — AMOXICILLIN 250 MG
1 CAPSULE ORAL AT BEDTIME
Status: DISCONTINUED | OUTPATIENT
Start: 2024-03-03 | End: 2024-03-03

## 2024-03-03 RX ORDER — ONDANSETRON 2 MG/ML
4 INJECTION INTRAMUSCULAR; INTRAVENOUS EVERY 6 HOURS PRN
Status: DISCONTINUED | OUTPATIENT
Start: 2024-03-03 | End: 2024-03-11 | Stop reason: HOSPADM

## 2024-03-03 RX ORDER — ACETAMINOPHEN 650 MG/1
650 SUPPOSITORY RECTAL EVERY 4 HOURS PRN
Status: DISCONTINUED | OUTPATIENT
Start: 2024-03-03 | End: 2024-03-11 | Stop reason: HOSPADM

## 2024-03-03 RX ORDER — ESCITALOPRAM OXALATE 20 MG/1
20 TABLET ORAL DAILY
Status: DISCONTINUED | OUTPATIENT
Start: 2024-03-03 | End: 2024-03-11 | Stop reason: HOSPADM

## 2024-03-03 RX ADMIN — TACROLIMUS 2 MG: 1 CAPSULE ORAL at 18:03

## 2024-03-03 RX ADMIN — SITAGLIPTIN 100 MG: 100 TABLET, FILM COATED ORAL at 08:34

## 2024-03-03 RX ADMIN — FOLIC ACID 1 MG: 1 TABLET ORAL at 08:33

## 2024-03-03 RX ADMIN — METHOCARBAMOL 500 MG: 500 TABLET ORAL at 20:59

## 2024-03-03 RX ADMIN — POLYETHYLENE GLYCOL 3350 17 G: 17 POWDER, FOR SOLUTION ORAL at 08:38

## 2024-03-03 RX ADMIN — GLYCERIN, PETROLATUM, PHENYLEPHRINE HCL, PRAMOXINE HCL: 144; 2.5; 10; 15 CREAM TOPICAL at 09:15

## 2024-03-03 RX ADMIN — GABAPENTIN 300 MG: 300 CAPSULE ORAL at 08:33

## 2024-03-03 RX ADMIN — METHOCARBAMOL 500 MG: 500 TABLET ORAL at 15:54

## 2024-03-03 RX ADMIN — ACETAMINOPHEN 650 MG: 325 TABLET, FILM COATED ORAL at 14:30

## 2024-03-03 RX ADMIN — GLYCERIN, PETROLATUM, PHENYLEPHRINE HCL, PRAMOXINE HCL: 144; 2.5; 10; 15 CREAM TOPICAL at 14:22

## 2024-03-03 RX ADMIN — ACETAMINOPHEN 650 MG: 325 TABLET, FILM COATED ORAL at 18:41

## 2024-03-03 RX ADMIN — GABAPENTIN 300 MG: 300 CAPSULE ORAL at 20:59

## 2024-03-03 RX ADMIN — TACROLIMUS 2 MG: 1 CAPSULE ORAL at 08:34

## 2024-03-03 RX ADMIN — SENNOSIDES 8.6 MG: 8.6 TABLET, FILM COATED ORAL at 20:58

## 2024-03-03 RX ADMIN — Medication 1 TABLET: at 20:59

## 2024-03-03 RX ADMIN — POLYETHYLENE GLYCOL 3350 17 G: 17 POWDER, FOR SOLUTION ORAL at 20:58

## 2024-03-03 RX ADMIN — SENNOSIDES 8.6 MG: 8.6 TABLET, FILM COATED ORAL at 08:33

## 2024-03-03 RX ADMIN — ACETAMINOPHEN 650 MG: 325 TABLET, FILM COATED ORAL at 09:19

## 2024-03-03 RX ADMIN — METHOCARBAMOL 750 MG: 750 TABLET ORAL at 08:33

## 2024-03-03 RX ADMIN — GLYCERIN, PETROLATUM, PHENYLEPHRINE HCL, PRAMOXINE HCL: 144; 2.5; 10; 15 CREAM TOPICAL at 21:00

## 2024-03-03 RX ADMIN — ACETAMINOPHEN 650 MG: 325 TABLET, FILM COATED ORAL at 06:54

## 2024-03-03 RX ADMIN — ONDANSETRON 4 MG: 2 INJECTION INTRAMUSCULAR; INTRAVENOUS at 11:54

## 2024-03-03 RX ADMIN — ESCITALOPRAM OXALATE 20 MG: 20 TABLET ORAL at 11:08

## 2024-03-03 RX ADMIN — GABAPENTIN 300 MG: 300 CAPSULE ORAL at 14:22

## 2024-03-03 RX ADMIN — Medication 1 TABLET: at 08:33

## 2024-03-03 RX ADMIN — OXYBUTYNIN CHLORIDE 5 MG: 5 TABLET ORAL at 08:34

## 2024-03-03 RX ADMIN — LEVOTHYROXINE SODIUM 125 MCG: 125 TABLET ORAL at 06:55

## 2024-03-03 RX ADMIN — METHOCARBAMOL 500 MG: 500 TABLET ORAL at 11:08

## 2024-03-03 ASSESSMENT — ACTIVITIES OF DAILY LIVING (ADL)
ADLS_ACUITY_SCORE: 45
ADLS_ACUITY_SCORE: 44
ADLS_ACUITY_SCORE: 45
ADLS_ACUITY_SCORE: 44
ADLS_ACUITY_SCORE: 45
ADLS_ACUITY_SCORE: 45
ADLS_ACUITY_SCORE: 44
ADLS_ACUITY_SCORE: 49
ADLS_ACUITY_SCORE: 44
ADLS_ACUITY_SCORE: 49
ADLS_ACUITY_SCORE: 45
ADLS_ACUITY_SCORE: 44
ADLS_ACUITY_SCORE: 45
ADLS_ACUITY_SCORE: 45
ADLS_ACUITY_SCORE: 44
ADLS_ACUITY_SCORE: 49
ADLS_ACUITY_SCORE: 44
ADLS_ACUITY_SCORE: 44
ADLS_ACUITY_SCORE: 45
ADLS_ACUITY_SCORE: 45

## 2024-03-03 NOTE — PLAN OF CARE
VS: BP (!) 148/88 (BP Location: Right arm)   Pulse 69   Temp 98.6  F (37  C) (Oral)   Resp 16   SpO2 99%     O2: O2 SATS >90% denies chest pain,  or SBO   Output: Rossi in place with adequate amount of urine    Last BM: 3/2 per report  BS present all x4 quadrants    Activity: Up with SBA    Up for meals? N/A    Skin: Cast to LUE   Bruising to BUE    Pain: Denies pain  this shift    CMS: AOX4 denies Denies numbness and tingling   Dressing: Cast Dressing Clean dry and intact   Diet: Regular   LDA: R PIV SL    Equipment: IV Pole , Transfer belt personal belongings    Plan: TBD    Additional Info:

## 2024-03-03 NOTE — PLAN OF CARE
VS: Blood pressure 97/71, pulse (!) 48, temperature 97.6  F (36.4  C), temperature source Oral, resp. rate 16, SpO2 98%, not currently breastfeeding.   O2: SpO2>90% on RA. Denies SOB or CP   Output: Rossi in place with adequate amount of urine    Last BM: 3/3 per pt. BS present all x4 quadrants    Activity: Up with SBA    Skin: Cast to LUE   Bruising to BUE    Pain: Managed by scheduled Robaxin and PRN Tylenol.   CMS: A&O x 4. C/o tingling on her R hip/buttock with pain radiating upward.    Dressing: CDI   Diet: Regular. C/o of nausea, managed by IV Zofran.   LDA: R PIV SL   Equipment: IV pole, personal belongings   Plan: TBD. Continue the POC.   Additional Info:

## 2024-03-03 NOTE — PLAN OF CARE
Goal Outcome Evaluation:       /76 (BP Location: Right arm)   Pulse 60   Temp 97.9  F (36.6  C) (Oral)   Resp 16   SpO2 98%       VS: VSS and SpO2>90% on RA. Denies SOB or CP    O2: Stable on RA   Output: Adequate output in galloway catheter.   Last BM: 3/3 per pt. BS present all x4 quadrants    Activity: Up with SBA, steady gait    Skin: Cast to L lower FA   Bruise to R upper FA an L lower FA   Pain: Pt rate pain 6/10 in perineal area, PRN Tylenol was given.    Neuro: Alert and oriented x4.   VSS and afebrile   Dressing:  PIV and cast: C/D/I    Diet: Regular    LDA: R PIV-SL    Equipment: Cellphone,personal belongings at bedside    Plan: Continuo plan of care.   Additional Info: Patient is requesting GI, Urology, and colorectal consult, provider was notified, and sticky notes to physicians was wrote.  Call light within reach and able to make needs known

## 2024-03-03 NOTE — CONSULTS
Gynecology Consult Note    Patient Summary:  Susan Puckett is a 53 year old female seen at the request of Dr. Lacy Hudson for Concern for pelvic floor dysfunction and unable to go home due to pain. Could GYN do formal pelvic exam to rule out/in?    HPI:   Since her fall in February she has had shooting pain starting from her groin. The pain radiates from her her posterior labia, down and around to the inferior end of her buttock. Her pain is constant and she has intermittent bouts of sharp shooting pain that shoots up through her buttock that she has to breathe through. She feels her pain has worsened and it has affected her walking to the point of limping. She is quite frustrated that nothing seems to improve her pain. She has not had intercourse in the past months so cannot speak to dyspareunia, but does not think she has pain inside her vagina.     ROS:   No history of vaginal deliveries. She is not having vaginal bleeding, abnormal vaginal discharge or abdominal pain or cramping.     PMH:   Past Medical History:   Diagnosis Date    Anemia     Anxiety     Cold sore     Depressive disorder     Diabetes (H)     Type 2 DM/No Insulin     E. coli UTI 11/19/2022    Encephalopathy     Esophageal varices without bleeding (H)     grade I    History of blood transfusion     10/2019    History of seizure     diabetic seizure    Infertility management 11/05/2015    Insomnia     Liver cirrhosis secondary to CUETO (H) 05/28/2020    Migraine     Pleural effusion     Thrombocytopenia (H24)     Thyroid disease        PSHx:   Past Surgical History:   Procedure Laterality Date    APPENDECTOMY      1989    BENCH LIVER N/A 11/2/2022    Procedure: Bench liver;  Surgeon: Delbert Morgan MD;  Location: [facility] OR    COLONOSCOPY      1/2020 at [facility]     ENT SURGERY  1998    tempanoplasty    GI SURGERY      EGD August 2020 at [facility]     GYN SURGERY  2010    laparoscopic ablation    IR PARACENTESIS  6/8/2020    IR THORACENTESIS   2022    IR THORACENTESIS  2022    IR THORACENTESIS  2021    IR THORACENTESIS  2021    IR THORACENTESIS  2021    IR THORACENTESIS  2021    IR THORACENTESIS  2021    IR THORACENTESIS  2021    IR THORACENTESIS  10/29/2021    IR THORACENTESIS  10/28/2021    IR THORACENTESIS  10/27/2021    IR THORACENTESIS  10/24/2021    IR THORACENTESIS  10/22/2021    IR THORACENTESIS  10/5/2021    IR THORACENTESIS  2021    IR THORACENTESIS  2021    IR THORACENTESIS  2021    IR THORACENTESIS  2021    IR THORACENTESIS  2021    IR THORACENTESIS  2021    IR THORACENTESIS  8/10/2021    IR THORACENTESIS  2021    IR THORACENTESIS  2021    IR THORACENTESIS  2021    IR THORACENTESIS  2021    IR THORACENTESIS  2021    IR THORACENTESIS  3/31/2021    IR THORACENTESIS  2020    IR TRANSVEN INTRAHEPATIC PORTOSYST SHUNT  2021    MAMMOPLASTY REDUCTION BILATERAL      OR STAB PHELBECTOMY VARICOSE VEINS, LESS THAN 10 INCISIONS, ONE EXTREMITY      RNY gastric bypass  2006    retoocolic, retrogastric, [facility]    TONSILLECTOMY & ADENOIDECTOMY Bilateral     TRANSPLANT LIVER RECIPIENT  DONOR N/A 2022    Procedure: TRANSPLANT, LIVER, RECIPIENT,  DONOR;  Surgeon: Delbert Morgan MD;  Location:  OR    WISDOM TEETH EXTRACTION         Medications:   Current Facility-Administered Medications   Medication    acetaminophen (TYLENOL) tablet 650 mg    Or    acetaminophen (TYLENOL) Suppository 650 mg    calcium carbonate (TUMS) chewable tablet 1,000 mg    calcium carbonate-vitamin D (OSCAL) 500-5 MG-MCG per tablet 1 tablet    escitalopram (LEXAPRO) tablet 20 mg    folic acid (FOLVITE) tablet 1 mg    gabapentin (NEURONTIN) capsule 300 mg    hydrOXYzine HCl (ATARAX) tablet 25 mg    Or    hydrOXYzine HCl (ATARAX) tablet 50 mg    levothyroxine (SYNTHROID/LEVOTHROID) tablet 125 mcg    lidocaine (LMX4) cream    lidocaine  (XYLOCAINE) 2 % external gel    lidocaine 1 % 0.1-1 mL    methocarbamol (ROBAXIN) tablet 500 mg    ondansetron (ZOFRAN) injection 4 mg    oxyBUTYnin (DITROPAN) tablet 5 mg    polyethylene glycol (MIRALAX) Packet 17 g    pramox-pe-glycerin-petrolatum (PREPARATION H) cream    senna-docusate (SENOKOT-S/PERICOLACE) 8.6-50 MG per tablet 1 tablet    Or    senna-docusate (SENOKOT-S/PERICOLACE) 8.6-50 MG per tablet 2 tablet    sennosides (SENOKOT) tablet 8.6 mg    sitagliptin (JANUVIA) tablet 100 mg    sodium chloride (PF) 0.9% PF flush 3 mL    sodium chloride (PF) 0.9% PF flush 3 mL    tacrolimus (GENERIC) capsule 2 mg    topiramate (TOPAMAX) tablet 100 mg    traZODone (DESYREL) half-tab 25 mg    witch hazel-glycerin (TUCKS) pad     No current facility-administered medications on file prior to encounter.  JANUVIA 100 MG tablet, Take 1 tablet by mouth daily  oxyBUTYnin (DITROPAN) 5 MG tablet, Take 5 mg by mouth daily  topiramate (TOPAMAX) 50 MG tablet, Take 1 tablet by mouth 2 times daily   magnesium glycinate lysinate chelate (DOCTOR'S BEST) 100 MG TABS tablet, Take 2 tablets (200 mg) by mouth 2 times daily  acetaminophen (TYLENOL) 325 MG tablet, Take 3 tablets (975 mg) by mouth every 8 hours as needed for mild pain  acyclovir (ZOVIRAX) 400 MG tablet, TAKE 1 TABLET BY MOUTH THREE TIMES A DAY FOR 5 DAYS.  aspirin (ASA) 81 MG chewable tablet, Take 1 tablet (81 mg) by mouth daily  calcium carbonate-vitamin D (OSCAL) 500-5 MG-MCG tablet, Take 1 tablet by mouth 2 times daily  Continuous Blood Gluc  (DEXCOM G6 ) LUNA,   Continuous Blood Gluc Sensor (DEXCOM G6 SENSOR) MISC,   Continuous Blood Gluc Transmit (DEXCOM G6 TRANSMITTER) MISC,   cyanocobalamin (VITAMIN B-12) 1000 MCG tablet, Take 1,000 mcg by mouth every 7 days on Wednesdays  diazepam (VALIUM) 5 MG tablet, TAKE 1 TABLET BY MOUTH ONCE FOR 1 DOSE. *BRING TO MRI APPT AND WAIT FOR FURTHER GUIDANCE*  docusate sodium (DSS) 100 MG capsule, Take 100 mg by mouth  (Patient not taking: Reported on 10/27/2023)  escitalopram (LEXAPRO) 20 MG tablet, Take 30 mg by mouth daily  folic acid (FOLVITE) 1 MG tablet, Take 1 mg by mouth every morning  gabapentin (NEURONTIN) 100 MG capsule, Take 300 mg by mouth 3 times daily  levothyroxine (SYNTHROID/LEVOTHROID) 125 MCG tablet, Take 125 mcg by mouth daily  LORazepam (ATIVAN) 0.5 MG tablet, Take 0.5 mg by mouth nightly as needed For anxiety  melatonin 5 MG tablet, Take 5 mg by mouth At Bedtime  metoclopramide (REGLAN) 5 MG tablet, Take 1 tablet (5 mg) by mouth 3 times daily as needed  ondansetron (ZOFRAN ODT) 8 MG ODT tab, Take 1 tablet (8 mg) by mouth every 8 hours as needed for nausea  tacrolimus (GENERIC) 1 MG capsule, Take 2 capsules (2 mg) by mouth every 12 hours  topiramate (TOPAMAX) 25 MG tablet, Take 100 mg by mouth At Bedtime  traZODone (DESYREL) 50 MG tablet, Take 25 mg by mouth At Bedtime        Allergies:    Allergies   Allergen Reactions    Methylprednisolone Hives     Per patient, reaction occurred in 1999 or earlier. Broke out in round, flat hives in neck and chest area. No shortness of breath or swelling. Unknown if reaction occurred immediately after administration.     Adhesive Tape Itching    Oxycodone      slurring    Droperidol Anxiety    Nsaids Other (See Comments)     GI bleed.    Prednisone Anxiety, Hives and Rash     Per patient she has tolerated this since       Social History:   Social History     Socioeconomic History    Marital status:      Spouse name: Not on file    Number of children: Not on file    Years of education: Not on file    Highest education level: Bachelor's degree (e.g., BA, AB, BS)   Occupational History    Not on file   Tobacco Use    Smoking status: Never    Smokeless tobacco: Never   Vaping Use    Vaping Use: Never used   Substance and Sexual Activity    Alcohol use: Not Currently     Comment: Last drink was in 2017    Drug use: Never    Sexual activity: Not on file   Other Topics  Concern    Parent/sibling w/ CABG, MI or angioplasty before 65F 55M? Not Asked   Social History Narrative    Not on file     Social Determinants of Health     Financial Resource Strain: Not on file   Food Insecurity: No Food Insecurity (11/5/2020)    Hunger Vital Sign     Worried About Running Out of Food in the Last Year: Never true     Ran Out of Food in the Last Year: Never true   Transportation Needs: No Transportation Needs (11/5/2020)    PRAPARE - Transportation     Lack of Transportation (Medical): No     Lack of Transportation (Non-Medical): No   Physical Activity: Insufficiently Active (11/5/2020)    Exercise Vital Sign     Days of Exercise per Week: 1 day     Minutes of Exercise per Session: 10 min   Stress: Stress Concern Present (11/5/2020)    [place] [employer] - Occupational Stress Questionnaire     Feeling of Stress : To some extent   Social Connections: Moderately Integrated (11/5/2020)    Social Connection and Isolation Panel [NHANES]     Frequency of Communication with Friends and Family: More than three times a week     Frequency of Social Gatherings with Friends and Family: Once a week     Attends Religion Services: More than 4 times per year     Active Member of Clubs or Organizations: No     Attends Club or Organization Meetings: Never     Marital Status:    Interpersonal Safety: Not At Risk (5/26/2023)    Humiliation, Afraid, Rape, and Kick questionnaire     Fear of Current or Ex-Partner: No     Emotionally Abused: No     Physically Abused: No     Sexually Abused: No   Housing Stability: Low Risk  (11/5/2020)    Housing Stability Vital Sign     Unable to Pay for Housing in the Last Year: No     Number of Places Lived in the Last Year: 1     Unstable Housing in the Last Year: No     Social History     Socioeconomic History    Marital status:      Spouse name: None    Number of children: None    Years of education: None    Highest education level: Bachelor's  degree (e.g., BA, AB, BS)   Tobacco Use    Smoking status: Never    Smokeless tobacco: Never   Vaping Use    Vaping Use: Never used   Substance and Sexual Activity    Alcohol use: Not Currently     Comment: Last drink was in 2017    Drug use: Never     Social Determinants of Health     Food Insecurity: No Food Insecurity (11/5/2020)    Hunger Vital Sign     Worried About Running Out of Food in the Last Year: Never true     Ran Out of Food in the Last Year: Never true   Transportation Needs: No Transportation Needs (11/5/2020)    PRAPARE - Transportation     Lack of Transportation (Medical): No     Lack of Transportation (Non-Medical): No   Physical Activity: Insufficiently Active (11/5/2020)    Exercise Vital Sign     Days of Exercise per Week: 1 day     Minutes of Exercise per Session: 10 min   Stress: Stress Concern Present (11/5/2020)    [place] [employer] - Occupational Stress Questionnaire     Feeling of Stress : To some extent   Social Connections: Moderately Integrated (11/5/2020)    Social Connection and Isolation Panel [NHANES]     Frequency of Communication with Friends and Family: More than three times a week     Frequency of Social Gatherings with Friends and Family: Once a week     Attends Jew Services: More than 4 times per year     Active Member of Clubs or Organizations: No     Attends Club or Organization Meetings: Never     Marital Status:    Interpersonal Safety: Not At Risk (5/26/2023)    Humiliation, Afraid, Rape, and Kick questionnaire     Fear of Current or Ex-Partner: No     Emotionally Abused: No     Physically Abused: No     Sexually Abused: No   Housing Stability: Low Risk  (11/5/2020)    Housing Stability Vital Sign     Unable to Pay for Housing in the Last Year: No     Number of Places Lived in the Last Year: 1     Unstable Housing in the Last Year: No       Physical Exam:   Vitals:    03/03/24 0744 03/03/24 0815 03/03/24 0919 03/03/24 1430   BP: 93/65  97/71  121/76   BP Location: Right arm Right arm  Right arm   Pulse: (!) 48 (!) 48 58 60   Resp: 16      Temp: 97.6  F (36.4  C)      TempSrc: Oral      SpO2: 98%         Gen:NAD, resting in bed  CV: RR peripheral pulses 2+ bilaterally  Pulm: no increased work of breathing  Abd: non-tender, non-distended  Pelvis:   - Rossi catheter in place, draining clear yellow urine  - Pain is reproduced with palpation to right of perineum (insertion site of the gracilis muscle) externally down as well as on palpation of the right hamstring muscles nearly to the knee  - Bimanual exam notable for diffuse pelvic floor muscle tenderness to palpation.  No bladder tenderness, no cervical motion tenderness, no adnexal masses palpated.  Palpation to the pelvic floor muscles did NOT reproduce the pain that patient is complaining of.   Extremities: non-tender, no erythema; no edema    Labs:  Reviewed     A&P:   Susan Puckett is a 53 year old G0, on HD#2 for uncontrolled right gracilis, inguinal, and hamstring pain.   OBGYN was consulted to assess for pelvic floor dysfunction in the setting of acute on chronic pain. Susan's pelvic pain appears to be musculoskeletal, likely due to her recent fall affecting the right gracilis, gluteus and hamstring muscles, also evident from MRI. She also has a component of pelvic floor dysfunction and would greatly benefit from pelvic floor physical therapy, but palpation of these muscles of the pelvic floor does NOT reproduce the pain she is complaining of. She has concern that this therapy may not be covered by insurance, thus would consider primary team discussing with social work to ensure adequate insurance coverage. Overall reassured that there is no acute gynecological concern at this time.     In addition to outpatient pelvic floor evaluation, we would also recommend consideration of PM&R consult for her right extremity/external MSK. We agree with current multimodal pain management, and consult for  PT evaluation.  Thank you for this consult.  Please do not hesitate to contact us with concerns or questions.     Yulissa Singh MD  Obstetrics and Gynecology, PGY1  03/03/2024, 3:18 PM    Physician Attestation   I personally examined and evaluated this patient.  I discussed the patient with the resident/fellow and care team, and agree with the assessment and plan of care as documented in the note.     Key findings: Patient complains of right inguinal and posterior leg pain.  States it had acute onset after her fall.  She now feels the pain is worsening and making her limp.  We were consulted to assess for pelvic floor dysfunction.  On exam the patient's pain that she complains of is reproducible on external palpation of the gracilis muscle insertion site and hamstring.  On internal bimanual exam she has tenderness to palpation diffusely of the pelvic floor muscles.  This exam does not reproduce the pain she is complaining of and thus not the primary source of her symptoms.  She would however benefit in the long term from pelvic floor PT.  Patient expresses insurance coverage concerns.  Recommend primary team work with social work on insurance coverage questions.  Consideration of PM&R consultation for management or her right inguinal and leg pain affecting gait.      Nga Pérez MD  Date of Service (when I saw the patient): 03/03/24

## 2024-03-03 NOTE — UTILIZATION REVIEW
"    Admission Status; Secondary Review Determination         Under the authority of the Utilization Management Committee, the utilization review process indicated a secondary review on the above patient.  The review outcome is based on review of the medical records, discussions with staff, and applying clinical experience noted on the date of the review.          (x) Observation Status Appropriate - This patient does not meet hospital inpatient criteria and is placed in observation status. If this patient's primary payer is Medicare and was admitted as an inpatient, Condition Code 44 should be used and patient status changed to \"observation\".     RATIONALE FOR DETERMINATION   53-year-old female with a history of cirrhosis post-liver transplant, type II diabetes mellitus, hypothyroidism, generalized anxiety disorder, and CKD stage III was admitted on 3/2/2024 for uncontrolled pelvic floor pain, constipation, and urinary retention, following a recent admission for similar symptoms. Imaging indicated mild lumbar degenerative changes and left-sided tendinopathy with mild intramuscular signal abnormality within the gluteus medius, suspected to be related to a recent fall or possibly an autoimmune or inflammatory process. Her clinical presentation is suggestive of pelvic floor dysfunction, likely exacerbated by opioid use. Management includes OBGYN consultation, pain management without opioids, and consideration for pelvic floor physical therapy.  Minimal pain medication needs since admission.  The severity of illness, intensity of service provided, expected LOS and risk for adverse outcome make the care appropriate for further observation; however, doesn't meet criteria for hospital inpatient admission. Dr Bella  notified of this determination.    This document was produced using voice recognition software.      The information on this document is developed by the utilization review team in order for the business " office to ensure compliance.  This only denotes the appropriateness of proper admission status and does not reflect the quality of care rendered.         The definitions of Inpatient Status and Observation Status used in making the determination above are those provided in the CMS Coverage Manual, Chapter 1 and Chapter 6, section 70.4.      Sincerely,     HORACIO HARPER MD    System Medical Director  Utilization Management  [facility].

## 2024-03-03 NOTE — H&P
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 Of note, patient with recent admission to [facility] 2/21/2024-2/26/2024 for similar symptoms.     Pelvic Floor Pain  Ongoing opioid use  Urinary Retention  Constipation  External Hemorrhoids  Patient reports one month of ongoing pelvic floor pain (urethral, groin, rectal, gluteal) as well as urinary retention and constipation since her recent fall as noted below. Has undergone thorough work-up by PCP and OSH hospitalist providers. Prior CT and MRI Imaging of lumbar spine and pelvis showed mild lumbar degenerative changes without canal or foraminal stenosis. In the ED, MR of pelvis showed left sided tendinopathy and mild intramuscular signal abnormality within gluteus medius bilaterally. Unclear the exact etiology of these findings. Suspect they are related to trauma from fall, but could also consider autoimmune or inflammatory process such as myositis (though CRP and CK unremarkable). Overall, her clinical picture is highly suspicious for pelvic floor dysfunction likely secondary to combination of recent trauma and ongoing constipation and urinary retention from opioid use. Patient would benefit from formal OBGYN pelvic floor examination and potentially referral to pelvic floor physical therapy as well as cessation of opioids. Patient hemodynamically stable and appropriate for observation admission/pain management.  - OBGYN consult in AM for pelvic floor evaluation  - Consider  pain medicine consult  - Pain management   - Continue PTA Gabapentin 300 mg TID   - Robaxin QID scheduled    - Hydroxyzine PRN   - Avoid opioids given urinary retention and constipation   - Consider SNRI  - Hemorrhoid management below   - Prep H   - Tuck pads   - Sitz baths    External Hemorrhoids  Constipation  Patient with known, painful external hemorrhoids likely exacerbated by constipation . Was seen by colorectal surgery at OSH week prior to admission who recommended supportive management for hemorrhoids. Aggressive management of constipation was also done at OSH (bowel prep and lactulose enema and Relistor) and was started on bowel regimen on admission. On admission, patient continues to complain of constipation and small bowel movements. Was given Relistor & Dulcolax suppository by ED without relief.   - Constipation management   - Obtain abdominal x-ray   - Continue PTA MiraLax BID and Senna BID   - Will need to defer aggressive management until patient has a room    - Consider Lactulose oral versus enema, additional dose of Relistor  - OSH MD recommended possible NG Tube placement for aggressive stool evacuation  - Hemorrhoid management  - Prep H   - Tuck pads   - Sitz baths    Urinary Retention  Recent CAUTI  Patient with history of urinary retention and required galloway catheter at OSH. Urology consulted and felt to be related to ongoing opioid use and constipation. Patient did develop CAUTI during prior hospital stay and was treated with Cefdinir (should have completed day of admission). On presentation, again noted to have retention and galloway catheter placed.  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 Cook as outpatient. PTA on tacrolimus BID.  - Continue PTA tacrolimus  - Tacrolimus levels in AM  - Consider transplant hepatology    Diabetes Mellitus Type  II  Follows with [facility] as outpatient. Most recent hemolgobin A1c of 7.1 in November 2023. PTA on Januvia 100 mg daily.  - Continue PTA Januvia    Seizure Disorder  Recent Subarachnoid Hemorrhage  Recent admission to [facility] 2/9/2024-2/11/2024 for subarachnoid hemorrhage (as well as thumb injury) after fall from seizure. Seizure was felt to be related to hypoglycemic event. Neurology and neurosurgery were both involved during her hospital stay. No EEG or neurosurgical intervention. Was admitted again to [facility] 2/16 for 2/19 for headache as well as wrist/rib pain felt to be related to recent fall.  - Continue PTA topiramate  - Follow up with neurosurgery as previously scheduled    CKD Stage III  On admission, creatinine at baseline of 1.2-1.5.  - Avoid nephrotoxins    Insomnia Continue PTA Trazodone.   Anxiety, depression Continue PTA escitalopram.  Hypothyroidism Continue PTA levothyroxine.         Diet: Combination Diet Regular Diet Adult    DVT Prophylaxis: Pneumatic Compression Devices  Rossi Catheter: PRESENT, indication:    Lines: None     Cardiac Monitoring: None  Code Status: Full Code      Clinically Significant Risk Factors Present on Admission                # Drug Induced Platelet Defect: home medication list includes an antiplatelet medication                   Disposition Plan      Expected Discharge Date: 03/03/2024                The patient's care was discussed with the Attending Physician, Dr. Hudson, Bedside Nurse, Patient, and Patient's Family.    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 Of note, patient with recent admission to [facility] 2/21/2024-2/26/2024 for similar symptoms.     Patient reports ongoing, severe pelvic pain since fall/seizure in February. Reports no one has been able to provide her with a solution/answer. She states she \"cannot live this way\" due to the degree of her pain. Unknown if she had any trauma to pelvic area during her fall. Notes ongoing constipation/urinary retention. No history of pelvic floor issues, recent vaginal trauma.     Past Medical History    Past Medical History:   Diagnosis Date    Anemia     Anxiety     Cold sore     Depressive disorder     Diabetes (H)     Type 2 DM/No Insulin     E. coli UTI 11/19/2022    Encephalopathy     Esophageal varices without bleeding (H)     grade I    History of blood transfusion     10/2019    History of seizure     diabetic seizure    Infertility management 11/05/2015    Insomnia     Liver cirrhosis secondary to CUETO (H) 05/28/2020    Migraine     Pleural effusion     Thrombocytopenia (H24)     Thyroid disease        Past Surgical History   Past Surgical History:   Procedure Laterality Date    APPENDECTOMY      1989    BENCH LIVER N/A 11/2/2022    Procedure: Bench liver;  Surgeon: Delbert Morgan MD;  Location: [facility] OR    COLONOSCOPY      1/2020 at [facility]     ENT SURGERY  1998    tempanoplasty    GI SURGERY      EGD August 2020 at [facility]     GYN SURGERY  2010    laparoscopic ablation    IR PARACENTESIS  6/8/2020    IR THORACENTESIS  6/6/2022    IR THORACENTESIS  1/11/2022    IR THORACENTESIS  12/2/2021    IR THORACENTESIS  11/29/2021    IR " THORACENTESIS  2021    IR THORACENTESIS  2021    IR THORACENTESIS  2021    IR THORACENTESIS  2021    IR THORACENTESIS  10/29/2021    IR THORACENTESIS  10/28/2021    IR THORACENTESIS  10/27/2021    IR THORACENTESIS  10/24/2021    IR THORACENTESIS  10/22/2021    IR THORACENTESIS  10/5/2021    IR THORACENTESIS  2021    IR THORACENTESIS  2021    IR THORACENTESIS  2021    IR THORACENTESIS  2021    IR THORACENTESIS  2021    IR THORACENTESIS  2021    IR THORACENTESIS  8/10/2021    IR THORACENTESIS  2021    IR THORACENTESIS  2021    IR THORACENTESIS  2021    IR THORACENTESIS  2021    IR THORACENTESIS  2021    IR THORACENTESIS  3/31/2021    IR THORACENTESIS  2020    IR TRANSVEN INTRAHEPATIC PORTOSYST SHUNT  2021    MAMMOPLASTY REDUCTION BILATERAL      SC STAB PHELBECTOMY VARICOSE VEINS, LESS THAN 10 INCISIONS, ONE EXTREMITY      RNY gastric bypass  2006    retoocolic, retrogastric, [facility]    TONSILLECTOMY & ADENOIDECTOMY Bilateral     TRANSPLANT LIVER RECIPIENT  DONOR N/A 2022    Procedure: TRANSPLANT, LIVER, RECIPIENT,  DONOR;  Surgeon: Delbert Morgan MD;  Location: [facility] OR    WISDOM TEETH EXTRACTION         Prior to Admission Medications   Prior to Admission Medications   Prescriptions Last Dose Informant Patient Reported? Taking?    magnesium glycinate lysinate chelate (DOCTOR'S BEST) 100 MG TABS tablet   No No   Sig: Take 2 tablets (200 mg) by mouth 2 times daily   Continuous Blood Gluc  (DEXCOM G6 ) LUNA  Spouse/Significant Other Yes No   Continuous Blood Gluc Sensor (DEXCOM G6 SENSOR) MISC  Spouse/Significant Other Yes No   Continuous Blood Gluc Transmit (DEXCOM G6 TRANSMITTER) MISC  Spouse/Significant Other Yes No   LORazepam (ATIVAN) 0.5 MG tablet  Spouse/Significant Other Yes No   Sig: Take 0.5 mg by mouth nightly as needed For anxiety   acetaminophen (TYLENOL) 325 MG tablet    No No   Sig: Take 3 tablets (975 mg) by mouth every 8 hours as needed for mild pain   acyclovir (ZOVIRAX) 400 MG tablet   Yes No   Sig: TAKE 1 TABLET BY MOUTH THREE TIMES A DAY FOR 5 DAYS.   aspirin (ASA) 81 MG chewable tablet   No No   Sig: Take 1 tablet (81 mg) by mouth daily   calcium carbonate-vitamin D (OSCAL) 500-5 MG-MCG tablet  Spouse/Significant Other Yes No   Sig: Take 1 tablet by mouth 2 times daily   cyanocobalamin (VITAMIN B-12) 1000 MCG tablet  Spouse/Significant Other Yes No   Sig: Take 1,000 mcg by mouth every 7 days on Wednesdays   diazepam (VALIUM) 5 MG tablet   Yes No   Sig: TAKE 1 TABLET BY MOUTH ONCE FOR 1 DOSE. *BRING TO MRI APPT AND WAIT FOR FURTHER GUIDANCE*   docusate sodium (DSS) 100 MG capsule   Yes No   Sig: Take 100 mg by mouth   Patient not taking: Reported on 10/27/2023   escitalopram (LEXAPRO) 20 MG tablet  Spouse/Significant Other Yes No   Sig: Take 30 mg by mouth daily   fludrocortisone (FLORINEF) 0.1 MG tablet   No No   Sig: Take 1 tablet (0.1 mg) by mouth daily   folic acid (FOLVITE) 1 MG tablet  Spouse/Significant Other Yes No   Sig: Take 1 mg by mouth every morning   gabapentin (NEURONTIN) 100 MG capsule   Yes No   Sig: Take 300 mg by mouth 3 times daily   levothyroxine (SYNTHROID/LEVOTHROID) 125 MCG tablet   Yes No   Sig: Take 125 mcg by mouth daily   melatonin 5 MG tablet  Spouse/Significant Other Yes No   Sig: Take 5 mg by mouth At Bedtime   metoclopramide (REGLAN) 5 MG tablet   No No   Sig: Take 1 tablet (5 mg) by mouth 3 times daily as needed   ondansetron (ZOFRAN ODT) 8 MG ODT tab   No No   Sig: Take 1 tablet (8 mg) by mouth every 8 hours as needed for nausea   tacrolimus (GENERIC) 1 MG capsule   No No   Sig: Take 2 capsules (2 mg) by mouth every 12 hours   topiramate (TOPAMAX) 25 MG tablet   Yes No   Sig: Take 100 mg by mouth At Bedtime   traZODone (DESYREL) 50 MG tablet  Spouse/Significant Other Yes No   Sig: Take 25 mg by mouth At Bedtime      Facility-Administered  Medications: None         Physical Exam   Vital Signs: Temp: 98  F (36.7  C) Temp src: Oral BP: 132/86 Pulse: 85   Resp: 17 SpO2: 98 % O2 Device: None (Room air)    Weight: 0 lbs 0 oz    General Appearance: Comfortable, nontoxic appearing female seen sitting in wheelchair. Intermittently tearful.   Eyes: PERRLA.  No conjunctival icterus.  HEENT: Atraumatic.  Respiratory: Breathing comfortably on room air.    :  Rossi catheter in place with light yellow urine. No hematuria.   Lymph/Hematologic: No bruising on exposed skin.  Skin: No lesions or rashes noted on exposed skin.  Musculoskeletal: Moving all extremities spontaneously. Wrist brace/cast on left wrist.   Neurologic: Cranial nerves II through XII grossly intact.  Psychiatric: Labile mood.       Medical Decision Making       75 MINUTES SPENT BY ME on the date of service doing chart review, history, exam, documentation & further activities per the note.      Data   Imaging results reviewed over the past 24 hrs:   Recent Results (from the past 24 hour(s))   MR Pelvis Bone wo Contrast    Narrative    EXAM: MR PELVIC BONES W/O CONTRAST  LOCATION: [facility]  DATE: 3/2/2024    INDICATION: Severe pain, debilitating, inability to bear weight.  COMPARISON: None.  TECHNIQUE: Unenhanced.    FINDINGS:     HIPS:   -Both hips are negative for fracture or avascular necrosis. No significant effusion on either side. No gross stable tearing on large field-of-view imaging.    MUSCLES AND TENDONS:  -Gluteal: No tendon tear or tendinopathy. No trochanteric bursitis.    -Proximal hamstring: Left-sided hamstring tendinopathy without tearing.   -Iliopsoas: No tendon tear or tendinopathy. No bursitis.    BONES:   -No fracture or concerning marrow replacing lesion.   -SI joints are normal. Visualized lumbar spine is normal.    SOFT TISSUES:   -There is some intermediate signal abnormality within the gluteus medius musculature bilaterally  which could be due to muscle strain but there is no tearing. There is mild nonspecific edema within the subcutaneous soft tissues lateral to both hips.   Findings are fairly symmetric. No evidence for organized fluid collection.    INTRAPELVIC CONTENTS:   -Visualized portions are normal.      Impression    IMPRESSION:  1.  Both hips are negative for fracture or avascular necrosis. No significant effusion on either side. No evidence for gross labral tearing on large field-of-view imaging.  2.  Bony pelvis negative for fracture or bone marrow signal abnormality.  3.  Mild left-sided hamstring tendinopathy without tearing.  4.  Mild intramuscular signal abnormality within the gluteus medius bilaterally.  5.  Advanced fatty infiltration and atrophy of the paraspinal musculature bilaterally.  6.  Mild edema within the subcutaneous soft tissues lateral to both hips.   XR Abdomen 1 View    Impression    RESIDENT PRELIMINARY INTERPRETATION  Impression: Moderate stool burden, no obstruction.     Recent Labs   Lab 03/02/24  1124 02/29/24  1844   WBC  --  7.8   HGB  --  12.4   MCV  --  98   PLT  --  246   NA  --  136   POTASSIUM  --  4.3   CHLORIDE  --  101   CO2  --  24   BUN  --  16.8   CR 1.36* 1.45*   ANIONGAP  --  11   MAURI  --  8.5*   GLC  --  128*

## 2024-03-03 NOTE — PLAN OF CARE
Goal Outcome Evaluation:                        VS: VSS and afebrile   O2: Stable on RA   Output: Adequate output in galloway catheter.   Last BM: 3/2. Very small BM per pt report   Activity: Up with SBA, steady gait    Skin: Cast to L lower FA   Bruise to R upper FA an L lower FA   Pain: Minimal pain in perineal area, improving since after galloway cath insertion   Neuro: Alert and oriented x4.   VSS and afebrile   Dressing:  PIV and cast: CDI    Diet: Regular    LDA: R PIV-SL    Equipment: Cellphone,personal belongings at bedside    Plan: TBD   Additional Info:

## 2024-03-03 NOTE — PROGRESS NOTES
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 Of note, patient with recent admission to [facility] 2/21/2024-2/26/2024 for similar symptoms.      New Today  Continued groin pain  PT consultation  Refer to observation status.    # Groin pain  # Pelvic floor pain?  # Recent history of fall  -- Ongoing pain, she states started in February 9th following her apparently ground-level fall.  --MR of pelvis showed left sided tendinopathy and mild intramuscular signal abnormality within gluteus medius bilaterally.   -- On examination appears MSK in nature.  -- Still troublesome to the patient and states that she is at difficulty ambulating.  -- Conservative management including   -- PTA gabapentin   -- Robaxin 4 times daily (decreased dose to 500 mg from 750, as patient has drowsy this morning)  -- Physical therapy consultation appreciate  -- OB/GYN consultation appreciated    # External hemorrhoids  # Constipation  -- Continue Preparation H, Tucks pads, sitz bath.  --Patient had x-ray of the abdomen showing significant stool burden  -- Bowel regimen including MiraLAX scheduled, senna scheduled,  -- Will add tapwater enema today as patient is interested    # Urinary retention?  # Recent CAUTI  -- UA showing nitrates,  -- Rossi catheter placed on admission  -- Strict MICHA's  Intake/Output Summary (Last 24 hours) at 3/3/2024 1034  Last data filed at 3/3/2024 0600  Gross per 24 hour   Intake --   Output 1750 ml   Net -1750 ml      #History  of ESLD secondary to MASLD #s/p DDLT in 2022  Follows with Dr. Cook as outpatient. PTA on tacrolimus BID.  - Continue PTA tacrolimus  - Tacrolimus levels in AM  - Consider transplant hepatology     #Diabetes Mellitus Type II  Follows with [facility] as outpatient. Most recent hemolgobin A1c of 7.1 in November 2023. PTA on Januvia 100 mg daily.  - Continue PTA Januvia     #Seizure Disorder  #Recent Subarachnoid Hemorrhage  Recent admission to [facility] 2/9/2024-2/11/2024 for subarachnoid hemorrhage (as well as thumb injury) after fall from seizure. Seizure was felt to be related to hypoglycemic event. Neurology and neurosurgery were both involved during her hospital stay. No EEG or neurosurgical intervention. Was admitted again to [facility] 2/16 for 2/19 for headache as well as wrist/rib pain felt to be related to recent fall.  - Continue PTA topiramate  - Follow up with neurosurgery as previously scheduled     #CKD Stage III  On admission, creatinine at baseline of 1.2-1.5.  - Avoid nephrotoxins               Diet: Combination Diet Regular Diet Adult    DVT Prophylaxis: Pneumatic Compression Devices  Rossi Catheter: PRESENT, indication:    Lines: None     Cardiac Monitoring: None  Code Status: Full Code      Clinically Significant Risk Factors Present on Admission                # Drug Induced Platelet Defect: home medication list includes an antiplatelet medication                   Disposition Plan      Expected Discharge Date: 03/04/2024                    Cynthia Bella MD  Hospitalist Service, [address]  Securely message with [employer] (more info)  Text page via [facility] Paging/Directory   See signed in provider for up to date coverage information  ______________________________________________________________________    Interval History   Patient seen and examined at bedside no acute distress.  Patient states she is in extreme pain pointing to her  groin and buttock region on the right side.  Otherwise no new symptoms including lightheaded dizziness.      Physical Exam   Vital Signs: Temp: 97.6  F (36.4  C) Temp src: Oral BP: 97/71 Pulse: (!) 48   Resp: 16 SpO2: 98 % O2 Device: None (Room air)    Weight: 0 lbs 0 oz    General Appearance: Appears comfortable.  Respiratory: Without wheezes rhonchi or rales.  CTA  Cardiovascular: RRR, without murmurs  GI: Soft, nontender, plus BS  Skin: Without obvious bleeding, bruising or excoriations  EXT/MSK: Decreased range of motion at the hip flexor, due to pain.  Straight leg positive.    Medical Decision Making       57 MINUTES SPENT BY ME on the date of service doing chart review, history, exam, documentation & further activities per the note.      Data   ------------------------- PAST 24 HR DATA REVIEWED -----------------------------------------------

## 2024-03-04 LAB
ANION GAP SERPL CALCULATED.3IONS-SCNC: 5 MMOL/L (ref 7–15)
BASOPHILS # BLD AUTO: 0 10E3/UL (ref 0–0.2)
BASOPHILS # BLD AUTO: 0 10E3/UL (ref 0–0.2)
BASOPHILS NFR BLD AUTO: 0 %
BASOPHILS NFR BLD AUTO: 1 %
BUN SERPL-MCNC: 11.9 MG/DL (ref 6–20)
CALCIUM SERPL-MCNC: 8.1 MG/DL (ref 8.6–10)
CHLORIDE SERPL-SCNC: 107 MMOL/L (ref 98–107)
CREAT SERPL-MCNC: 1.26 MG/DL (ref 0.51–0.95)
DEPRECATED HCO3 PLAS-SCNC: 25 MMOL/L (ref 22–29)
EGFRCR SERPLBLD CKD-EPI 2021: 51 ML/MIN/1.73M2
EOSINOPHIL # BLD AUTO: 0.1 10E3/UL (ref 0–0.7)
EOSINOPHIL # BLD AUTO: 0.2 10E3/UL (ref 0–0.7)
EOSINOPHIL NFR BLD AUTO: 3 %
EOSINOPHIL NFR BLD AUTO: 3 %
ERYTHROCYTE [DISTWIDTH] IN BLOOD BY AUTOMATED COUNT: 12.7 % (ref 10–15)
ERYTHROCYTE [DISTWIDTH] IN BLOOD BY AUTOMATED COUNT: 12.7 % (ref 10–15)
GLUCOSE BLDC GLUCOMTR-MCNC: 163 MG/DL (ref 70–99)
GLUCOSE BLDC GLUCOMTR-MCNC: 166 MG/DL (ref 70–99)
GLUCOSE SERPL-MCNC: 113 MG/DL (ref 70–99)
HCT VFR BLD AUTO: 32.5 % (ref 35–47)
HCT VFR BLD AUTO: 37.5 % (ref 35–47)
HGB BLD-MCNC: 10.1 G/DL (ref 11.7–15.7)
HGB BLD-MCNC: 11.7 G/DL (ref 11.7–15.7)
IMM GRANULOCYTES # BLD: 0 10E3/UL
IMM GRANULOCYTES # BLD: 0 10E3/UL
IMM GRANULOCYTES NFR BLD: 1 %
IMM GRANULOCYTES NFR BLD: 1 %
LYMPHOCYTES # BLD AUTO: 1.2 10E3/UL (ref 0.8–5.3)
LYMPHOCYTES # BLD AUTO: 2 10E3/UL (ref 0.8–5.3)
LYMPHOCYTES NFR BLD AUTO: 30 %
LYMPHOCYTES NFR BLD AUTO: 39 %
MCH RBC QN AUTO: 30.9 PG (ref 26.5–33)
MCH RBC QN AUTO: 31.4 PG (ref 26.5–33)
MCHC RBC AUTO-ENTMCNC: 31.1 G/DL (ref 31.5–36.5)
MCHC RBC AUTO-ENTMCNC: 31.2 G/DL (ref 31.5–36.5)
MCV RBC AUTO: 101 FL (ref 78–100)
MCV RBC AUTO: 99 FL (ref 78–100)
MONOCYTES # BLD AUTO: 0.3 10E3/UL (ref 0–1.3)
MONOCYTES # BLD AUTO: 0.4 10E3/UL (ref 0–1.3)
MONOCYTES NFR BLD AUTO: 7 %
MONOCYTES NFR BLD AUTO: 8 %
NEUTROPHILS # BLD AUTO: 2.3 10E3/UL (ref 1.6–8.3)
NEUTROPHILS # BLD AUTO: 2.6 10E3/UL (ref 1.6–8.3)
NEUTROPHILS NFR BLD AUTO: 49 %
NEUTROPHILS NFR BLD AUTO: 58 %
NRBC # BLD AUTO: 0 10E3/UL
NRBC # BLD AUTO: 0 10E3/UL
NRBC BLD AUTO-RTO: 0 /100
NRBC BLD AUTO-RTO: 0 /100
PLATELET # BLD AUTO: 167 10E3/UL (ref 150–450)
PLATELET # BLD AUTO: 225 10E3/UL (ref 150–450)
POTASSIUM SERPL-SCNC: 4.6 MMOL/L (ref 3.4–5.3)
RBC # BLD AUTO: 3.27 10E6/UL (ref 3.8–5.2)
RBC # BLD AUTO: 3.73 10E6/UL (ref 3.8–5.2)
SODIUM SERPL-SCNC: 137 MMOL/L (ref 135–145)
WBC # BLD AUTO: 4 10E3/UL (ref 4–11)
WBC # BLD AUTO: 5.2 10E3/UL (ref 4–11)

## 2024-03-04 PROCEDURE — G0378 HOSPITAL OBSERVATION PER HR: HCPCS

## 2024-03-04 PROCEDURE — 36415 COLL VENOUS BLD VENIPUNCTURE: CPT | Performed by: STUDENT IN AN ORGANIZED HEALTH CARE EDUCATION/TRAINING PROGRAM

## 2024-03-04 PROCEDURE — 250N000013 HC RX MED GY IP 250 OP 250 PS 637: Performed by: STUDENT IN AN ORGANIZED HEALTH CARE EDUCATION/TRAINING PROGRAM

## 2024-03-04 PROCEDURE — 99233 SBSQ HOSP IP/OBS HIGH 50: CPT | Performed by: STUDENT IN AN ORGANIZED HEALTH CARE EDUCATION/TRAINING PROGRAM

## 2024-03-04 PROCEDURE — 999N000147 HC STATISTIC PT IP EVAL DEFER

## 2024-03-04 PROCEDURE — 250N000012 HC RX MED GY IP 250 OP 636 PS 637

## 2024-03-04 PROCEDURE — 250N000013 HC RX MED GY IP 250 OP 250 PS 637: Performed by: INTERNAL MEDICINE

## 2024-03-04 PROCEDURE — 80048 BASIC METABOLIC PNL TOTAL CA: CPT | Performed by: STUDENT IN AN ORGANIZED HEALTH CARE EDUCATION/TRAINING PROGRAM

## 2024-03-04 PROCEDURE — 85025 COMPLETE CBC W/AUTO DIFF WBC: CPT | Mod: 91 | Performed by: STUDENT IN AN ORGANIZED HEALTH CARE EDUCATION/TRAINING PROGRAM

## 2024-03-04 PROCEDURE — 250N000013 HC RX MED GY IP 250 OP 250 PS 637

## 2024-03-04 PROCEDURE — 82962 GLUCOSE BLOOD TEST: CPT

## 2024-03-04 RX ORDER — NARATRIPTAN 2.5 MG/1
2.5 TABLET ORAL
COMMUNITY

## 2024-03-04 RX ORDER — NALOXONE HYDROCHLORIDE 0.4 MG/ML
0.2 INJECTION, SOLUTION INTRAMUSCULAR; INTRAVENOUS; SUBCUTANEOUS
Status: DISCONTINUED | OUTPATIENT
Start: 2024-03-04 | End: 2024-03-11 | Stop reason: HOSPADM

## 2024-03-04 RX ORDER — NALOXONE HYDROCHLORIDE 0.4 MG/ML
0.4 INJECTION, SOLUTION INTRAMUSCULAR; INTRAVENOUS; SUBCUTANEOUS
Status: DISCONTINUED | OUTPATIENT
Start: 2024-03-04 | End: 2024-03-11 | Stop reason: HOSPADM

## 2024-03-04 RX ORDER — HYDROCODONE BITARTRATE AND ACETAMINOPHEN 5; 325 MG/1; MG/1
1 TABLET ORAL EVERY 6 HOURS PRN
Status: COMPLETED | OUTPATIENT
Start: 2024-03-04 | End: 2024-03-04

## 2024-03-04 RX ADMIN — GABAPENTIN 300 MG: 300 CAPSULE ORAL at 19:59

## 2024-03-04 RX ADMIN — GLYCERIN, PETROLATUM, PHENYLEPHRINE HCL, PRAMOXINE HCL: 144; 2.5; 10; 15 CREAM TOPICAL at 14:56

## 2024-03-04 RX ADMIN — Medication 1 TABLET: at 19:58

## 2024-03-04 RX ADMIN — FOLIC ACID 1 MG: 1 TABLET ORAL at 08:22

## 2024-03-04 RX ADMIN — HYDROCODONE BITARTRATE AND ACETAMINOPHEN 1 TABLET: 5; 325 TABLET ORAL at 23:02

## 2024-03-04 RX ADMIN — HYDROCODONE BITARTRATE AND ACETAMINOPHEN 1 TABLET: 5; 325 TABLET ORAL at 17:21

## 2024-03-04 RX ADMIN — METHOCARBAMOL 500 MG: 500 TABLET ORAL at 08:21

## 2024-03-04 RX ADMIN — TACROLIMUS 2 MG: 1 CAPSULE ORAL at 08:21

## 2024-03-04 RX ADMIN — TOPIRAMATE 100 MG: 25 TABLET, FILM COATED ORAL at 00:00

## 2024-03-04 RX ADMIN — TOPIRAMATE 100 MG: 25 TABLET, FILM COATED ORAL at 23:01

## 2024-03-04 RX ADMIN — Medication 25 MG: at 00:00

## 2024-03-04 RX ADMIN — GABAPENTIN 300 MG: 300 CAPSULE ORAL at 14:55

## 2024-03-04 RX ADMIN — SENNOSIDES 8.6 MG: 8.6 TABLET, FILM COATED ORAL at 19:59

## 2024-03-04 RX ADMIN — SITAGLIPTIN 100 MG: 100 TABLET, FILM COATED ORAL at 11:04

## 2024-03-04 RX ADMIN — HYDROCODONE BITARTRATE AND ACETAMINOPHEN 1 TABLET: 5; 325 TABLET ORAL at 10:59

## 2024-03-04 RX ADMIN — OXYBUTYNIN CHLORIDE 5 MG: 5 TABLET ORAL at 08:20

## 2024-03-04 RX ADMIN — POLYETHYLENE GLYCOL 3350 17 G: 17 POWDER, FOR SOLUTION ORAL at 19:58

## 2024-03-04 RX ADMIN — Medication 25 MG: at 23:02

## 2024-03-04 RX ADMIN — SENNOSIDES 8.6 MG: 8.6 TABLET, FILM COATED ORAL at 08:21

## 2024-03-04 RX ADMIN — TACROLIMUS 2 MG: 1 CAPSULE ORAL at 17:21

## 2024-03-04 RX ADMIN — GLYCERIN, PETROLATUM, PHENYLEPHRINE HCL, PRAMOXINE HCL: 144; 2.5; 10; 15 CREAM TOPICAL at 10:59

## 2024-03-04 RX ADMIN — ESCITALOPRAM OXALATE 20 MG: 20 TABLET ORAL at 08:21

## 2024-03-04 RX ADMIN — HYDROXYZINE HYDROCHLORIDE 50 MG: 50 TABLET, FILM COATED ORAL at 20:07

## 2024-03-04 RX ADMIN — GABAPENTIN 300 MG: 300 CAPSULE ORAL at 08:21

## 2024-03-04 RX ADMIN — LEVOTHYROXINE SODIUM 125 MCG: 125 TABLET ORAL at 08:21

## 2024-03-04 RX ADMIN — Medication 1 TABLET: at 08:22

## 2024-03-04 RX ADMIN — METHOCARBAMOL 500 MG: 500 TABLET ORAL at 11:04

## 2024-03-04 RX ADMIN — HYDROXYZINE HYDROCHLORIDE 50 MG: 50 TABLET, FILM COATED ORAL at 00:01

## 2024-03-04 RX ADMIN — GLYCERIN, PETROLATUM, PHENYLEPHRINE HCL, PRAMOXINE HCL: 144; 2.5; 10; 15 CREAM TOPICAL at 20:08

## 2024-03-04 RX ADMIN — METHOCARBAMOL 500 MG: 500 TABLET ORAL at 19:59

## 2024-03-04 RX ADMIN — POLYETHYLENE GLYCOL 3350 17 G: 17 POWDER, FOR SOLUTION ORAL at 08:22

## 2024-03-04 RX ADMIN — METHOCARBAMOL 500 MG: 500 TABLET ORAL at 17:21

## 2024-03-04 RX ADMIN — ACETAMINOPHEN 650 MG: 325 TABLET, FILM COATED ORAL at 00:00

## 2024-03-04 RX ADMIN — ACETAMINOPHEN 650 MG: 325 TABLET, FILM COATED ORAL at 08:20

## 2024-03-04 ASSESSMENT — ACTIVITIES OF DAILY LIVING (ADL)
ADLS_ACUITY_SCORE: 45
ADLS_ACUITY_SCORE: 48
ADLS_ACUITY_SCORE: 48
ADLS_ACUITY_SCORE: 45

## 2024-03-04 NOTE — PLAN OF CARE
Goal Outcome Evaluation:      Plan of Care Reviewed With: patient    Overall Patient Progress: improvingOverall Patient Progress: improving    VS: VSS   O2: SpO2>90% on RA. Denies SOB or CP   Output: Rossi in place with adequate amount of urine    Last BM: 3/3 per pt.    Activity: Up with SBA    Skin: Cast to LUE   Bruising to BUE    Pain: Managed by PRN Tylenol and atarax   CMS: A&O x 4.    Dressing: CDI   Diet: Regular.    LDA: R PIV SL   Equipment: IV pole, personal belongings   Plan: TBD. Continue the POC.   Additional Info:

## 2024-03-04 NOTE — PLAN OF CARE
PT: PT orders received.  Pt demonstrating IND with mobility in the room and in the antunez, educated on being up ambulating more for activity tolerance.  Pt complaining of R posterior glute/hamstring pain, given stretches to complete as tightness and guarding noted.  Also given abdominal setting exercise to help with pelvic floor.  At this time pt safe to discharge from a PT standpoint, no further PT needs at this time, PT orders completed.

## 2024-03-04 NOTE — PROGRESS NOTES
End of shift Summary: See flowsheet for VS and detail assessments.     Changes this Shift:      Pulmonary: Pt denies SOB & chest pain. Pt is on RA.     Diet: Regular diet, medication whole with thin liquids.    Output: Pt is voiding adequately via galloway, LBM 3/03.     Activity: Pt is stand by assist.     Skin: Old abdominal incision.     Pain: Pt rates pain 8/10, managed with PRN tylenol.     Neuro/CMS: Pt is alert and oriented x 4. Denies numbness and tingling.     Dressings/Drains: L hand cast wdl.     IV: R PIV.     Additional info: Seizure precautions maintained. No significant changes on shift.     Plan:  Plan of care ongoing.

## 2024-03-04 NOTE — PLAN OF CARE
Goal Outcome Evaluation:      Plan of Care Reviewed With: patient    Overall Patient Progress: improving      VS: VSS, AOX4   O2: RA   Output: Continent   Rossi; patent and draining adequately    Last BM: 3/4   Activity: Indp, SBA   Skin: Intact   Pain: Managed with PRN medication   CMS: Denies n/t   Diet: Regular   LDA: Right PIV; SL; dressing; CDI       Additional Info: Pt denied SOB, and chest pain, watched TV most of the time.

## 2024-03-04 NOTE — PROGRESS NOTES
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 Of note, patient with recent admission to [facility] 2/21/2024-2/26/2024 for similar symptoms.      New Today  Worsening groin pain  Bowel regimen, trial of enema  Urology consultation  Plan for discharge in next 24 to 48 hours    # Groin pain  # Pelvic floor pain--> worse today  # Recent history of fall  -- Ongoing pain, she states started in February 9th following her apparently ground-level fall.  --MR of pelvis showed left sided tendinopathy and mild intramuscular signal abnormality within gluteus medius bilaterally.   -- On examination appears MSK in nature.  -- Still troublesome to the patient and states today that she is actually able to ambulate in the room  -- Conservative management including   -- PTA gabapentin   -- Robaxin 4 times daily (decreased dose to 500 mg from 750, as patient has drowsy this morning)   -- Trial of  Norco. X3 doses  -- Physical therapy consultation appreciate  -- OB/GYN consultation appreciated 3/4--> recommend pelvic floor physical therapy on discharge    # External hemorrhoids  # Constipation  -- Continue Preparation H, Tucks pads, sitz bath.  --Patient had x-ray of the abdomen showing significant stool burden  -- Bowel regimen including MiraLAX scheduled, senna scheduled,-  -- Will add tapwater enema 3/4 as patient is interested-> trial of enema today    # Urinary retention?  # Recent CAUTI  -- UA showing nitrates,--> vitally stable white count  normal no indication to treat  -- Rossi catheter placed on admission---> spoke with urology after consultation they feel comfortable maintaining Rossi in place she has a follow-up appointment on 312 in the outpatient setting.  They also recommend discontinuing oxybutynin.  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 Cook as outpatient. PTA on tacrolimus BID.  - Continue PTA tacrolimus  - Tacrolimus levels in AM  - Consider transplant hepatology     #Diabetes Mellitus Type II  Follows with [facility] as outpatient. Most recent hemolgobin A1c of 7.1 in November 2023. PTA on Januvia 100 mg daily.  - Continue PTA Januvia     #Seizure Disorder  #Recent Subarachnoid Hemorrhage  Recent admission to [facility] 2/9/2024-2/11/2024 for subarachnoid hemorrhage (as well as thumb injury) after fall from seizure. Seizure was felt to be related to hypoglycemic event. Neurology and neurosurgery were both involved during her hospital stay. No EEG or neurosurgical intervention. Was admitted again to [facility] 2/16 for 2/19 for headache as well as wrist/rib pain felt to be related to recent fall.  - Continue PTA topiramate  - Follow up with neurosurgery as previously scheduled     #CKD Stage III  On admission, creatinine at baseline of 1.2-1.5.  - Avoid nephrotoxins               Diet: Combination Diet Regular Diet Adult    DVT Prophylaxis: Pneumatic Compression Devices  Rossi Catheter: PRESENT, indication:    Lines: None     Cardiac Monitoring: None  Code Status: Full Code      Clinically Significant Risk Factors Present on Admission                # Drug Induced Platelet Defect: home medication list includes an antiplatelet medication                   Disposition Plan     Expected Discharge Date: 03/04/2024               "      Cynthia Bella MD  Hospitalist Service, GOLD TEAM 18  [facility]  Securely message with [employer] (more info)  Text page via [employer] Paging/Directory   See signed in provider for up to date coverage information  ______________________________________________________________________    Interval History   Patient seen and examined at bedside no acute distress.  Patient complain about significant pain in her groin.  She says is unbearable \" I cannot live like this\".  Patient becomes very tearful in the exam room.  She is writhing in pain and states her pain is not controlled.  She denies other symptoms including chest pain headache lightheadedness or dyspnea.  Patient also requesting we get GI and colorectal surgery involved.    Physical Exam   Vital Signs: Temp: 98.3  F (36.8  C) Temp src: Oral BP: 100/60 Pulse: 58   Resp: 16 SpO2: 97 % O2 Device: None (Room air)    Weight: 0 lbs 0 oz    General Appearance: Appears uncomfortable  Respiratory: Without wheezes rhonchi or rales.  CTA  Cardiovascular: RRR, without murmurs  GI: Soft, nontender, plus BS  Skin: Without obvious bleeding, bruising or excoriations    Medical Decision Making       57 MINUTES SPENT BY ME on the date of service doing chart review, history, exam, documentation & further activities per the note.      Data   ------------------------- PAST 24 HR DATA REVIEWED -----------------------------------------------  "

## 2024-03-04 NOTE — PLAN OF CARE
Goal Outcome Evaluation:      Plan of Care Reviewed With: patient    Overall Patient Progress: improving    VS: Vss,AOX4   O2: RA   Output: Continent bowel  Rossi cath; draining adequally    Last BM: 3/4   Activity: Ind   Skin: Refer to flowsheet   Pain: Managed with PRN medications   CMS: Refer to flowsheet   Dressing: Cast on right arm   Diet: Takes pills whole   LDA: L PIV; SL; dressing; CDI   Additional Info: Pt denies SOB and chest pain.     No acute changes during this shift, call light within reach, continue with plan of care.

## 2024-03-04 NOTE — PHARMACY-ADMISSION MEDICATION HISTORY
Pharmacist Admission Medication History    Admission medication history is complete. The information provided in this note is only as accurate as the sources available at the time of the update.    Information Source(s): Caregiver via phone ( - Rivera)    Pertinent Information:   - Patient's  manages medications. List was reviewed over the phone.   - Tacrolimus dose confirmed 2 mg BID   - Other ~recent fills that Rivera confirms patient is not taking:     - Furosemide 40 mg daily #90 filled 12/11 - is unsure if this was discontinued but is not on his list to give      - Metformin ER 1000 mg/d #180 filled 2/16 - thinks this was discontinued when Januvia was started      - Methocarbamaol 500 mg TID x 14 d filled 2/26 - thinks this was going to be completed soon     Changes made to PTA medication list:  Added: naratriptan  Deleted:   Magnesium glycinate 200 mg BID - no longer taking   Acyclovir 400 mg TID x 5d - completed   ASA 81 mg daily - Rivera thinks this was discontinued   Diazepam 5 mg x1 for MRI - completed   Lorazepam 0.5 mg at bedtime PRN - no longer taking   Oxybutynin 5 mg TID PRN - Rivera thinks this was only for a short course and has been completed (10d supply filled 2/24/24)   Topiramate 50 mg BID - Rivera confirms this is 100 mg at bedtime instead   Trazodone 25 mg at bedtime - Rivera thinks this was discontinued   Changed:   Escitalopram 20 mg -> 30 mg (fills are for 20 mg/d, but Rivera states this was increased and he gives 1.5 tabs/dose)   Gabapentin 300 mg TID -> 200 mg TID (fills are for 100 mg BID, but Rivera states he gives 2 caps TID)    Allergies reviewed with patient and updates made in EHR: yes    Medication History Completed By: BRAXTON CORDOBA RPH 3/4/2024 3:30 PM    PTA Med List   Medication Sig Last Dose    calcium carbonate-vitamin D (OSCAL) 500-5 MG-MCG tablet Take 1 tablet by mouth 2 times daily Past Week    cyanocobalamin (VITAMIN B-12) 1000 MCG tablet Take 1,000 mcg by mouth  every 7 days on Wednesdays Past Week    escitalopram (LEXAPRO) 20 MG tablet Take 30 mg by mouth daily Past Week    folic acid (FOLVITE) 1 MG tablet Take 1 mg by mouth every morning Past Week    gabapentin (NEURONTIN) 100 MG capsule Take 200 mg by mouth 3 times daily Past Week    levothyroxine (SYNTHROID/LEVOTHROID) 125 MCG tablet Take 125 mcg by mouth daily Past Week    melatonin 5 MG tablet Take 5 mg by mouth At Bedtime Past Week    metoclopramide (REGLAN) 5 MG tablet Take 1 tablet (5 mg) by mouth 3 times daily as needed Past Month    naratriptan (AMERGE) 2.5 MG tablet Take 2.5 mg by mouth at onset of headache for migraine Past Month    ondansetron (ZOFRAN ODT) 8 MG ODT tab Take 1 tablet (8 mg) by mouth every 8 hours as needed for nausea Past Month    sitagliptin (JANUVIA) 100 MG tablet Take 100 mg by mouth daily Past Week    tacrolimus (GENERIC) 1 MG capsule Take 2 capsules (2 mg) by mouth every 12 hours Past Week    topiramate (TOPAMAX) 50 MG tablet Take 100 mg by mouth at bedtime Past Week

## 2024-03-05 ENCOUNTER — TELEPHONE (OUTPATIENT)
Dept: TRANSPLANT | Facility: CLINIC | Age: 54
End: 2024-03-05
Payer: COMMERCIAL

## 2024-03-05 LAB
ANION GAP SERPL CALCULATED.3IONS-SCNC: 5 MMOL/L (ref 7–15)
BUN SERPL-MCNC: 13.1 MG/DL (ref 6–20)
CALCIUM SERPL-MCNC: 8 MG/DL (ref 8.6–10)
CHLORIDE SERPL-SCNC: 110 MMOL/L (ref 98–107)
CREAT SERPL-MCNC: 1.2 MG/DL (ref 0.51–0.95)
DEPRECATED HCO3 PLAS-SCNC: 23 MMOL/L (ref 22–29)
EGFRCR SERPLBLD CKD-EPI 2021: 55 ML/MIN/1.73M2
GLUCOSE SERPL-MCNC: 112 MG/DL (ref 70–99)
HOLD SPECIMEN: NORMAL
POTASSIUM SERPL-SCNC: 4.9 MMOL/L (ref 3.4–5.3)
SODIUM SERPL-SCNC: 138 MMOL/L (ref 135–145)

## 2024-03-05 PROCEDURE — 250N000011 HC RX IP 250 OP 636: Performed by: STUDENT IN AN ORGANIZED HEALTH CARE EDUCATION/TRAINING PROGRAM

## 2024-03-05 PROCEDURE — 96376 TX/PRO/DX INJ SAME DRUG ADON: CPT

## 2024-03-05 PROCEDURE — 250N000013 HC RX MED GY IP 250 OP 250 PS 637: Performed by: STUDENT IN AN ORGANIZED HEALTH CARE EDUCATION/TRAINING PROGRAM

## 2024-03-05 PROCEDURE — 99232 SBSQ HOSP IP/OBS MODERATE 35: CPT | Performed by: INTERNAL MEDICINE

## 2024-03-05 PROCEDURE — 80048 BASIC METABOLIC PNL TOTAL CA: CPT | Performed by: STUDENT IN AN ORGANIZED HEALTH CARE EDUCATION/TRAINING PROGRAM

## 2024-03-05 PROCEDURE — 250N000013 HC RX MED GY IP 250 OP 250 PS 637: Performed by: INTERNAL MEDICINE

## 2024-03-05 PROCEDURE — 96375 TX/PRO/DX INJ NEW DRUG ADDON: CPT

## 2024-03-05 PROCEDURE — G0378 HOSPITAL OBSERVATION PER HR: HCPCS

## 2024-03-05 PROCEDURE — 250N000012 HC RX MED GY IP 250 OP 636 PS 637

## 2024-03-05 PROCEDURE — 250N000013 HC RX MED GY IP 250 OP 250 PS 637

## 2024-03-05 PROCEDURE — 250N000011 HC RX IP 250 OP 636: Performed by: INTERNAL MEDICINE

## 2024-03-05 PROCEDURE — 36415 COLL VENOUS BLD VENIPUNCTURE: CPT | Performed by: STUDENT IN AN ORGANIZED HEALTH CARE EDUCATION/TRAINING PROGRAM

## 2024-03-05 RX ORDER — HYDROMORPHONE HYDROCHLORIDE 1 MG/ML
0.3 INJECTION, SOLUTION INTRAMUSCULAR; INTRAVENOUS; SUBCUTANEOUS ONCE
Status: COMPLETED | OUTPATIENT
Start: 2024-03-05 | End: 2024-03-05

## 2024-03-05 RX ORDER — HYDROCODONE BITARTRATE AND ACETAMINOPHEN 5; 325 MG/1; MG/1
1 TABLET ORAL EVERY 6 HOURS PRN
Status: DISCONTINUED | OUTPATIENT
Start: 2024-03-05 | End: 2024-03-08

## 2024-03-05 RX ADMIN — METHOCARBAMOL 500 MG: 500 TABLET ORAL at 16:56

## 2024-03-05 RX ADMIN — TACROLIMUS 2 MG: 1 CAPSULE ORAL at 18:00

## 2024-03-05 RX ADMIN — HYDROCODONE BITARTRATE AND ACETAMINOPHEN 1 TABLET: 5; 325 TABLET ORAL at 16:55

## 2024-03-05 RX ADMIN — GLYCERIN, PETROLATUM, PHENYLEPHRINE HCL, PRAMOXINE HCL: 144; 2.5; 10; 15 CREAM TOPICAL at 08:29

## 2024-03-05 RX ADMIN — GLYCERIN, PETROLATUM, PHENYLEPHRINE HCL, PRAMOXINE HCL: 144; 2.5; 10; 15 CREAM TOPICAL at 20:26

## 2024-03-05 RX ADMIN — SENNOSIDES 8.6 MG: 8.6 TABLET, FILM COATED ORAL at 08:16

## 2024-03-05 RX ADMIN — FOLIC ACID 1 MG: 1 TABLET ORAL at 08:16

## 2024-03-05 RX ADMIN — POLYETHYLENE GLYCOL 3350 17 G: 17 POWDER, FOR SOLUTION ORAL at 08:14

## 2024-03-05 RX ADMIN — SITAGLIPTIN 100 MG: 100 TABLET, FILM COATED ORAL at 08:15

## 2024-03-05 RX ADMIN — GABAPENTIN 300 MG: 300 CAPSULE ORAL at 14:33

## 2024-03-05 RX ADMIN — Medication 1 TABLET: at 08:16

## 2024-03-05 RX ADMIN — HYDROCODONE BITARTRATE AND ACETAMINOPHEN 1 TABLET: 5; 325 TABLET ORAL at 10:34

## 2024-03-05 RX ADMIN — METHOCARBAMOL 500 MG: 500 TABLET ORAL at 20:22

## 2024-03-05 RX ADMIN — POLYETHYLENE GLYCOL 3350 17 G: 17 POWDER, FOR SOLUTION ORAL at 20:23

## 2024-03-05 RX ADMIN — TOPIRAMATE 100 MG: 25 TABLET, FILM COATED ORAL at 21:19

## 2024-03-05 RX ADMIN — GABAPENTIN 300 MG: 300 CAPSULE ORAL at 20:22

## 2024-03-05 RX ADMIN — GABAPENTIN 300 MG: 300 CAPSULE ORAL at 08:16

## 2024-03-05 RX ADMIN — ACETAMINOPHEN 650 MG: 325 TABLET, FILM COATED ORAL at 08:15

## 2024-03-05 RX ADMIN — METHOCARBAMOL 500 MG: 500 TABLET ORAL at 12:42

## 2024-03-05 RX ADMIN — HYDROMORPHONE HYDROCHLORIDE 0.3 MG: 1 INJECTION, SOLUTION INTRAMUSCULAR; INTRAVENOUS; SUBCUTANEOUS at 21:19

## 2024-03-05 RX ADMIN — LEVOTHYROXINE SODIUM 125 MCG: 125 TABLET ORAL at 08:16

## 2024-03-05 RX ADMIN — SENNOSIDES 8.6 MG: 8.6 TABLET, FILM COATED ORAL at 20:22

## 2024-03-05 RX ADMIN — ONDANSETRON 4 MG: 2 INJECTION INTRAMUSCULAR; INTRAVENOUS at 10:51

## 2024-03-05 RX ADMIN — HYDROXYZINE HYDROCHLORIDE 50 MG: 50 TABLET, FILM COATED ORAL at 20:21

## 2024-03-05 RX ADMIN — TACROLIMUS 2 MG: 1 CAPSULE ORAL at 08:16

## 2024-03-05 RX ADMIN — Medication 25 MG: at 22:46

## 2024-03-05 RX ADMIN — GLYCERIN, PETROLATUM, PHENYLEPHRINE HCL, PRAMOXINE HCL: 144; 2.5; 10; 15 CREAM TOPICAL at 14:33

## 2024-03-05 RX ADMIN — METHOCARBAMOL 500 MG: 500 TABLET ORAL at 08:16

## 2024-03-05 RX ADMIN — Medication 1 TABLET: at 20:22

## 2024-03-05 RX ADMIN — ESCITALOPRAM OXALATE 20 MG: 20 TABLET ORAL at 08:15

## 2024-03-05 ASSESSMENT — ACTIVITIES OF DAILY LIVING (ADL)
ADLS_ACUITY_SCORE: 52
ADLS_ACUITY_SCORE: 45
ADLS_ACUITY_SCORE: 45
ADLS_ACUITY_SCORE: 52
ADLS_ACUITY_SCORE: 45
ADLS_ACUITY_SCORE: 49
ADLS_ACUITY_SCORE: 45
ADLS_ACUITY_SCORE: 52
ADLS_ACUITY_SCORE: 46
ADLS_ACUITY_SCORE: 52
ADLS_ACUITY_SCORE: 45
ADLS_ACUITY_SCORE: 49
ADLS_ACUITY_SCORE: 45
ADLS_ACUITY_SCORE: 52
ADLS_ACUITY_SCORE: 52
ADLS_ACUITY_SCORE: 45
ADLS_ACUITY_SCORE: 52

## 2024-03-05 NOTE — INTERIM SUMMARY
Brief Orthopedic Update - Patient not examined    53 year old female with complex past medical history, presenting with ongoing pelvic floor pain, constipation and urinary retention. Ortho contacted regarding pelvic MRI which demonstrated hamstring tendinopathy.     Review of pelvic MRI demonstrates mild hypertensity in bilateral gluteal musculature, subcutaneous tissue, and left sided hamstring origin. Review of lumbar spine MRI demonstrates no spinal or foraminal stenosis, no signal within the canal.     Discussed the findings with the primary team. Discussed hamstring tendinopathy and tendinopathy of the short external rotators can be painful and is managed with outpatient physical therapy.     Will plan for patient to follow up with nonsurgical musculoskeletal sports medicine for evaluation and physical therapy referral. Please page ortho on call with questions or concerns.     Sabrina Guerrero MD   PGY-4 Orthopedic Surgery

## 2024-03-05 NOTE — PROGRESS NOTES
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 Of note, patient with recent admission to [facility] 2/21/2024-2/26/2024 for similar symptoms.          # acute Groin pain, buttock pain and pain in the pelvic floor, etiology unspecified.  CT of the pelvis, MRI of the pelvis, CT of the abdomen and pelvis done reviewed.  Imaging studies done negative for any pelvic bone fractures or any iWatches abnormalities.      # Recent history of fall, on February 9 secondary to seizure episode, witnessed seizure  # Multiple rib fractures on both the left ribs, multiple on the right ribs.  She continues to have some chest wall pain, requesting repeat imaging.  --MR of pelvis showed left sided tendinopathy and mild intramuscular signal abnormality within gluteus medius bilaterally.   -- On examination appears MSK in nature.  -- Still troublesome to the patient and states that she is at difficulty ambulating.  However she is able to get up and walk without using any assistive device.  Pain is fairly well-controlled with oxycodone.  -- Conservative management including   -- PTA gabapentin   -- Robaxin 4 times daily (decreased dose to 500 mg from 750, as patient has drowsy this morning)  -- Physical therapy consultation appreciate  -- OB/GYN consultation appreciated    # External hemorrhoids, # Constipation  -- Continue Preparation H, Tucks pads, sitz bath.  --Patient had x-ray of the abdomen showing significant stool burden  -- Bowel regimen  including MiraLAX scheduled, senna scheduled,  -Had a good BM 3/4.  Abdominal exam is benign.    # Urinary retention, acute.  Rossi catheter was placed.  No history of a chronic Rossi, neurogenic bladder.  Patient is not willing to go home with a chronic Rossi.  Urology consult has been placed this is discussed with the on-call resident, Dr. 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 Cook as outpatient. PTA on tacrolimus BID.  - Continue PTA tacrolimus  - Tacrolimus levels in AM  - Consider transplant hepatology     #Diabetes Mellitus Type II  Follows with [facility] as outpatient. Most recent hemolgobin A1c of 7.1 in November 2023. PTA on Januvia 100 mg daily.  - Continue PTA Januvia     #Seizure Disorder  #Recent Subarachnoid Hemorrhage  Recent admission to [facility] 2/9/2024-2/11/2024 for subarachnoid hemorrhage (as well as thumb injury) after fall from seizure. Seizure was felt to be related to hypoglycemic event. Neurology and neurosurgery were both involved during her hospital stay. No EEG or neurosurgical intervention. Was admitted again to [facility] 2/16 for 2/19 for headache as well as wrist/rib pain felt to be related to recent fall.  - Continue PTA topiramate  - Follow up with neurosurgery as previously scheduled     #CKD Stage III  On admission, creatinine at baseline of 1.2-1.5.  - Avoid nephrotoxins               Diet: Combination Diet Regular Diet Adult    DVT Prophylaxis: Pneumatic Compression Devices  Rossi Catheter: PRESENT, indication: Acute retention or obstruction  Lines: None     Cardiac Monitoring: None  Code Status: Full Code      Clinically Significant Risk Factors Present on Admission                                   Disposition Plan             Shyam Guerra MD  Hospitalist Service, GOLD TEAM 18  [facility]  Securely message with [employer] (more info)  Text page via [employer] Paging/Directory   See signed in provider for up to date coverage information  ______________________________________________________________________    Interval History   Patient is very anxious, tearful stating that she has not been seen by the consulting services that she hoped will see her during this hospitalization.  I did talk to the patient and her  , Roly.  Discussed with urology  -I did explain to the patient and her  the findings of the MRI of the pelvis, orthopedic consult will be obtained  -Also consulted GI hepatology for continued of care  Physical Exam   Vital Signs: Temp: 97.5  F (36.4  C) Temp src: Oral BP: 106/66 Pulse: 54   Resp: 18 SpO2: 95 % O2 Device: None (Room air)    Weight: 0 lbs 0 oz    General Appearance: Appears comfortable.  Respiratory: Without wheezes rhonchi or rales.  CTA  Cardiovascular: RRR, without murmurs  GI: Soft, nontender, plus BS  Skin: Without obvious bleeding, bruising or excoriations  EXT/MSK: Decreased range of motion at the hip flexor, due to pain.  Straight leg positive.    Medical Decision Making       57 MINUTES SPENT BY ME on the date of service doing chart review, history, exam, documentation & further activities per the note.      Data   ------------------------- PAST 24 HR DATA REVIEWED -----------------------------------------------

## 2024-03-05 NOTE — TELEPHONE ENCOUNTER
Susan inpatient on [place] her hospitalist wanted her transplant team to know that they would like GI to see her. Informed Susan that the hospitalist will need to consult GI, [facility] has no control over inpatient things.

## 2024-03-05 NOTE — PLAN OF CARE
Goal Outcome Evaluation:      Plan of Care Reviewed With: patient    Overall Patient Progress: improving    Outcome Evaluation: VSS. Pt up independently, galloway catheter in place and draining adequately. Pain managed with PRN medications. Pt is requesting to speak to urology prior to discharge because she does not wish to discharge with a galloway catheter.      VS: Temp: 98.6  F (37  C) Temp src: Oral BP: 104/67 Pulse: 56   Resp: 18 SpO2: 96 % O2 Device: None (Room air)       O2: 96% on room air, denies SOB and CP, no cough present. Lung sounds clear   Output: Galloway catheter with adequate output   Last BM: 03/04/2024   Activity: Up independently, pt intermittently uses walker when ambulating. Cast to LUE.   Skin: Scattered bruising to forearms, otherwise intact   Pain: Managed with norco, tylenol, robaxin and atarax   Neuro: A & O x4, denies N/T   Dressing: N/A   Diet: Regular   LDA: PIV SL   Equipment: Personal belongings, call light, walker, galloway   Plan: Continue with plan of care   Additional Info:

## 2024-03-05 NOTE — PLAN OF CARE
Assumed cares from 1900 to 2330  A/O x4, VSS. CMS intact. Breathing comfortably, denied chestpain. Ambulatory with walker, no dizziness and light-headedness. Had a bowel movement today and is passing gas. Rossi draining well with adequate UO. Pain is constant on the pelvic floor controlled with PRN Norco and Atarax. Seizure pads in place. BG at HS was 165.   Continue POC.

## 2024-03-06 ENCOUNTER — APPOINTMENT (OUTPATIENT)
Dept: CT IMAGING | Facility: CLINIC | Age: 54
End: 2024-03-06
Attending: INTERNAL MEDICINE
Payer: COMMERCIAL

## 2024-03-06 ENCOUNTER — APPOINTMENT (OUTPATIENT)
Dept: GENERAL RADIOLOGY | Facility: CLINIC | Age: 54
End: 2024-03-06
Attending: INTERNAL MEDICINE
Payer: COMMERCIAL

## 2024-03-06 LAB
ALBUMIN SERPL BCG-MCNC: 2.4 G/DL (ref 3.5–5.2)
ALP SERPL-CCNC: 117 U/L (ref 40–150)
ALT SERPL W P-5'-P-CCNC: 7 U/L (ref 0–50)
ANION GAP SERPL CALCULATED.3IONS-SCNC: 4 MMOL/L (ref 7–15)
AST SERPL W P-5'-P-CCNC: 15 U/L (ref 0–45)
BILIRUB SERPL-MCNC: 0.2 MG/DL
BUN SERPL-MCNC: 15.7 MG/DL (ref 6–20)
CALCIUM SERPL-MCNC: 8 MG/DL (ref 8.6–10)
CHLORIDE SERPL-SCNC: 110 MMOL/L (ref 98–107)
CREAT SERPL-MCNC: 1.21 MG/DL (ref 0.51–0.95)
DEPRECATED HCO3 PLAS-SCNC: 24 MMOL/L (ref 22–29)
EGFRCR SERPLBLD CKD-EPI 2021: 54 ML/MIN/1.73M2
ERYTHROCYTE [DISTWIDTH] IN BLOOD BY AUTOMATED COUNT: 12.6 % (ref 10–15)
GLUCOSE BLDC GLUCOMTR-MCNC: 130 MG/DL (ref 70–99)
GLUCOSE BLDC GLUCOMTR-MCNC: 160 MG/DL (ref 70–99)
GLUCOSE SERPL-MCNC: 107 MG/DL (ref 70–99)
HCT VFR BLD AUTO: 31.6 % (ref 35–47)
HGB BLD-MCNC: 9.9 G/DL (ref 11.7–15.7)
MCH RBC QN AUTO: 31.7 PG (ref 26.5–33)
MCHC RBC AUTO-ENTMCNC: 31.3 G/DL (ref 31.5–36.5)
MCV RBC AUTO: 101 FL (ref 78–100)
PLATELET # BLD AUTO: 171 10E3/UL (ref 150–450)
POTASSIUM SERPL-SCNC: 4.9 MMOL/L (ref 3.4–5.3)
PROT SERPL-MCNC: 4.7 G/DL (ref 6.4–8.3)
RBC # BLD AUTO: 3.12 10E6/UL (ref 3.8–5.2)
SODIUM SERPL-SCNC: 138 MMOL/L (ref 135–145)
TACROLIMUS BLD-MCNC: 4.4 UG/L (ref 5–15)
TME LAST DOSE: ABNORMAL H
TME LAST DOSE: ABNORMAL H
WBC # BLD AUTO: 4 10E3/UL (ref 4–11)

## 2024-03-06 PROCEDURE — 250N000013 HC RX MED GY IP 250 OP 250 PS 637: Performed by: STUDENT IN AN ORGANIZED HEALTH CARE EDUCATION/TRAINING PROGRAM

## 2024-03-06 PROCEDURE — 250N000013 HC RX MED GY IP 250 OP 250 PS 637: Performed by: INTERNAL MEDICINE

## 2024-03-06 PROCEDURE — 36415 COLL VENOUS BLD VENIPUNCTURE: CPT | Performed by: NURSE PRACTITIONER

## 2024-03-06 PROCEDURE — 85027 COMPLETE CBC AUTOMATED: CPT | Performed by: NURSE PRACTITIONER

## 2024-03-06 PROCEDURE — 70450 CT HEAD/BRAIN W/O DYE: CPT | Mod: 26 | Performed by: RADIOLOGY

## 2024-03-06 PROCEDURE — 80197 ASSAY OF TACROLIMUS: CPT | Performed by: NURSE PRACTITIONER

## 2024-03-06 PROCEDURE — 70450 CT HEAD/BRAIN W/O DYE: CPT

## 2024-03-06 PROCEDURE — G0378 HOSPITAL OBSERVATION PER HR: HCPCS

## 2024-03-06 PROCEDURE — 99223 1ST HOSP IP/OBS HIGH 75: CPT | Performed by: NURSE PRACTITIONER

## 2024-03-06 PROCEDURE — 82962 GLUCOSE BLOOD TEST: CPT

## 2024-03-06 PROCEDURE — 99232 SBSQ HOSP IP/OBS MODERATE 35: CPT | Performed by: INTERNAL MEDICINE

## 2024-03-06 PROCEDURE — 250N000013 HC RX MED GY IP 250 OP 250 PS 637

## 2024-03-06 PROCEDURE — 80053 COMPREHEN METABOLIC PANEL: CPT | Performed by: NURSE PRACTITIONER

## 2024-03-06 PROCEDURE — 71111 X-RAY EXAM RIBS/CHEST4/> VWS: CPT

## 2024-03-06 PROCEDURE — 250N000012 HC RX MED GY IP 250 OP 636 PS 637

## 2024-03-06 PROCEDURE — 96376 TX/PRO/DX INJ SAME DRUG ADON: CPT

## 2024-03-06 PROCEDURE — 250N000011 HC RX IP 250 OP 636: Performed by: STUDENT IN AN ORGANIZED HEALTH CARE EDUCATION/TRAINING PROGRAM

## 2024-03-06 RX ADMIN — ACETAMINOPHEN 650 MG: 325 TABLET, FILM COATED ORAL at 14:39

## 2024-03-06 RX ADMIN — TOPIRAMATE 100 MG: 25 TABLET, FILM COATED ORAL at 22:38

## 2024-03-06 RX ADMIN — HYDROXYZINE HYDROCHLORIDE 25 MG: 25 TABLET, FILM COATED ORAL at 12:46

## 2024-03-06 RX ADMIN — FOLIC ACID 1 MG: 1 TABLET ORAL at 09:09

## 2024-03-06 RX ADMIN — METHOCARBAMOL 500 MG: 500 TABLET ORAL at 20:35

## 2024-03-06 RX ADMIN — Medication 1 TABLET: at 09:08

## 2024-03-06 RX ADMIN — HYDROCODONE BITARTRATE AND ACETAMINOPHEN 1 TABLET: 5; 325 TABLET ORAL at 09:08

## 2024-03-06 RX ADMIN — GLYCERIN, PETROLATUM, PHENYLEPHRINE HCL, PRAMOXINE HCL: 144; 2.5; 10; 15 CREAM TOPICAL at 21:22

## 2024-03-06 RX ADMIN — Medication 25 MG: at 22:38

## 2024-03-06 RX ADMIN — METHOCARBAMOL 500 MG: 500 TABLET ORAL at 17:59

## 2024-03-06 RX ADMIN — TACROLIMUS 2 MG: 1 CAPSULE ORAL at 17:59

## 2024-03-06 RX ADMIN — POLYETHYLENE GLYCOL 3350 17 G: 17 POWDER, FOR SOLUTION ORAL at 20:36

## 2024-03-06 RX ADMIN — METHOCARBAMOL 500 MG: 500 TABLET ORAL at 09:08

## 2024-03-06 RX ADMIN — ONDANSETRON 4 MG: 2 INJECTION INTRAMUSCULAR; INTRAVENOUS at 14:35

## 2024-03-06 RX ADMIN — ESCITALOPRAM OXALATE 20 MG: 20 TABLET ORAL at 09:08

## 2024-03-06 RX ADMIN — GLYCERIN, PETROLATUM, PHENYLEPHRINE HCL, PRAMOXINE HCL: 144; 2.5; 10; 15 CREAM TOPICAL at 09:12

## 2024-03-06 RX ADMIN — LEVOTHYROXINE SODIUM 125 MCG: 125 TABLET ORAL at 06:52

## 2024-03-06 RX ADMIN — HYDROXYZINE HYDROCHLORIDE 50 MG: 50 TABLET, FILM COATED ORAL at 20:35

## 2024-03-06 RX ADMIN — GLYCERIN, PETROLATUM, PHENYLEPHRINE HCL, PRAMOXINE HCL: 144; 2.5; 10; 15 CREAM TOPICAL at 14:40

## 2024-03-06 RX ADMIN — GABAPENTIN 300 MG: 300 CAPSULE ORAL at 20:35

## 2024-03-06 RX ADMIN — POLYETHYLENE GLYCOL 3350 17 G: 17 POWDER, FOR SOLUTION ORAL at 09:11

## 2024-03-06 RX ADMIN — TACROLIMUS 2 MG: 1 CAPSULE ORAL at 09:08

## 2024-03-06 RX ADMIN — SITAGLIPTIN 100 MG: 100 TABLET, FILM COATED ORAL at 09:17

## 2024-03-06 RX ADMIN — HYDROCODONE BITARTRATE AND ACETAMINOPHEN 1 TABLET: 5; 325 TABLET ORAL at 17:58

## 2024-03-06 RX ADMIN — Medication 1 TABLET: at 20:35

## 2024-03-06 RX ADMIN — SENNOSIDES 8.6 MG: 8.6 TABLET, FILM COATED ORAL at 09:09

## 2024-03-06 RX ADMIN — ACETAMINOPHEN 650 MG: 325 TABLET, FILM COATED ORAL at 20:35

## 2024-03-06 RX ADMIN — ACETAMINOPHEN 650 MG: 325 TABLET, FILM COATED ORAL at 02:45

## 2024-03-06 RX ADMIN — SENNOSIDES 8.6 MG: 8.6 TABLET, FILM COATED ORAL at 20:35

## 2024-03-06 RX ADMIN — GABAPENTIN 300 MG: 300 CAPSULE ORAL at 14:30

## 2024-03-06 RX ADMIN — HYDROCODONE BITARTRATE AND ACETAMINOPHEN 1 TABLET: 5; 325 TABLET ORAL at 02:45

## 2024-03-06 RX ADMIN — GABAPENTIN 300 MG: 300 CAPSULE ORAL at 09:09

## 2024-03-06 RX ADMIN — METHOCARBAMOL 500 MG: 500 TABLET ORAL at 12:46

## 2024-03-06 ASSESSMENT — ACTIVITIES OF DAILY LIVING (ADL)
ADLS_ACUITY_SCORE: 47
ADLS_ACUITY_SCORE: 47
ADLS_ACUITY_SCORE: 52
ADLS_ACUITY_SCORE: 47
ADLS_ACUITY_SCORE: 52
ADLS_ACUITY_SCORE: 47
ADLS_ACUITY_SCORE: 52
ADLS_ACUITY_SCORE: 47
ADLS_ACUITY_SCORE: 52
ADLS_ACUITY_SCORE: 47
ADLS_ACUITY_SCORE: 52
ADLS_ACUITY_SCORE: 46
ADLS_ACUITY_SCORE: 47
ADLS_ACUITY_SCORE: 46
ADLS_ACUITY_SCORE: 52
ADLS_ACUITY_SCORE: 47

## 2024-03-06 NOTE — PLAN OF CARE
Goal Outcome Evaluation:      Plan of Care Reviewed With: patient    Overall Patient Progress: improvingOverall Patient Progress: improving    Pt is A&O x4,VSS,denies N/V,SOB and numbness and tingling, pt up independently,has a galloway catheter draining adequately.Pt c/o of groin pain radiating to the right buttock cheek rating 10/10 earlier on the shift, [employer] was  paged and ordered a once IV Dilaudid 0.3 mg,dose given at 2119 and was effective. Constant pelvic pain  managed with PRN Norco and tylenol. Pt had L thumb fracture on a cast dressing. LBM 3/5 and passing gas. Has R PIV SL. Last BG was 130, seizure pads in place.Pt has a pending Urology ands GI consults per the pt request.Pt slept comfortable this shift,will continue with POC

## 2024-03-06 NOTE — PLAN OF CARE
Goal Outcome Evaluation:      Plan of Care Reviewed With: patient, spouse    Overall Patient Progress: improvingOverall Patient Progress: improving           VS: /76 (BP Location: Left arm)   Pulse 67   Temp 98  F (36.7  C) (Oral)   Resp 18   SpO2 93%      O2: RA   Output: Urinate adequate amount    Last BM: 3/5/24   Activity: Pt walk on the [address] comfortably with her     Skin: Intact   Pain: Complained of increased pain today, managed with norco    CMS: Intact   Dressing: Cast to left hand   Diet: regular   LDA: PIV SL   Equipment: Pt cell phone and personal belongings within reach   Plan: TBD   Additional Info:

## 2024-03-06 NOTE — PROGRESS NOTES
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 Of note, patient with recent admission to [facility] 2/21/2024-2/26/2024 for similar symptoms.          # acute Groin pain, buttock pain and pain in the pelvic floor, etiology unspecified.  CT of the pelvis, MRI of the pelvis, CT of the abdomen and pelvis done reviewed.  Imaging studies done negative for any pelvic bone fractures or any bony abnormalities. Left Hamstring Tendinopathy, Ortho consult obtained.  Recommending outpatient therapy and musculoskeletal sports medicine consult as outpatient.      # Recent history of fall, on February 9 secondary to seizure episode, witnessed seizure  # Multiple rib fractures on both the left ribs, multiple on the right ribs.  She continues to have some chest wall pain, requesting repeat imaging.  --MR of pelvis showed left sided tendinopathy and mild intramuscular signal abnormality within gluteus medius bilaterally.   -- On examination appears MSK in nature.  -- Still troublesome to the patient and states that she is at difficulty ambulating.  However she is able to get up and walk without using any assistive device.  Pain is fairly well-controlled with oxycodone.  -- Conservative management including   -- PTA gabapentin   -- Robaxin 4 times daily (decreased dose to 500 mg from 750, as patient has drowsy this morning)  -- Physical therapy consultation appreciate  -- OB/GYN consultation appreciated    # External hemorrhoids, #  Constipation  -- Continue Preparation H, Tucks pads, sitz bath.  --Patient had x-ray of the abdomen showing significant stool burden  -- Bowel regimen including MiraLAX scheduled, senna scheduled,  -Had a good BM 3/4.  Abdominal exam is benign.    # Urinary retention, acute.  Rossi catheter was placed.  No history of a chronic Rossi, neurogenic bladder.  Patient is not willing to go home with a chronic Rossi.  Urology consult has been placed this is discussed with the on-call resident, Dr. Edmundo Spring.  Patient seen  Recommendations for increased mobility and ambulation, hyperglycemia antegrade and retrograde bowel regimen, to the mid use of narcotic pain medications, to avoid polypharmacy and anticholinergics if able.  Urology I agree with outpatient pelvic floor PT.  Trial of voiding closer to discharge.  Howe voiding trial fails, patient will go home with an indwelling Rossi catheter and follow-up with urology outpatient.  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 Cook as outpatient. PTA on tacrolimus BID.  - Continue PTA tacrolimus  - Tacrolimus levels in AM  - Consider transplant hepatology     #Diabetes Mellitus Type II  Follows with [facility] as outpatient. Most recent hemolgobin A1c of 7.1 in November 2023. PTA on Januvia 100 mg daily.  - Continue PTA Januvia     #Seizure Disorder  #Recent Subarachnoid Hemorrhage  Recent admission to [facility] 2/9/2024-2/11/2024 for subarachnoid hemorrhage (as well as thumb injury) after fall from seizure. Seizure was felt to be related to hypoglycemic event. Neurology and neurosurgery were both involved during her hospital stay. No EEG or neurosurgical intervention. Was admitted again  to [facility] 2/16 for 2/19 for headache as well as wrist/rib pain felt to be related to recent fall.  - Continue PTA topiramate  - Follow up with neurosurgery as previously scheduled     #CKD Stage III  On admission, creatinine at baseline of 1.2-1.5.  - Avoid nephrotoxins               Diet: Combination Diet Regular Diet Adult    DVT Prophylaxis: Pneumatic Compression Devices  Rossi Catheter: PRESENT, indication: Acute retention or obstruction  Lines: None     Cardiac Monitoring: None  Code Status: Full Code      Clinically Significant Risk Factors Present on Admission              # Hypoalbuminemia: Lowest albumin = 2.4 g/dL at 3/6/2024  5:47 AM, will monitor as appropriate                     Disposition Plan             Shyam Guerra MD  Hospitalist Service, GOLD TEAM 18  [facility]  Securely message with Vocera (more info)  Text page via [facility] Paging/Directory   See signed in provider for up to date coverage information  ______________________________________________________________________    Interval History   Urology seen patient.  Voiding trial will be done prior to discharge.    Patient is very anxious, tearful stating that she has not been seen by the consulting services that she hoped will see her during this hospitalization.  I did talk to the patient and her  , Roly.  Discussed with urology  -I did explain to the patient and her  the findings of the MRI of the pelvis, orthopedic consult will be obtained  -Also consulted GI hepatology for continued of care  Physical Exam   Vital Signs: Temp: 98.2  F (36.8  C) Temp src: Oral BP: 100/64 Pulse: 59   Resp: 16 SpO2: 96 % O2 Device: None (Room air)    Weight: 0 lbs 0 oz    General Appearance: Appears comfortable.  Respiratory: Without wheezes rhonchi or rales.  CTA  Cardiovascular: RRR, without murmurs  GI: Soft, nontender, plus BS  Skin: Without obvious bleeding, bruising or excoriations  EXT/MSK:  Decreased range of motion at the hip flexor, due to pain.  Straight leg positive.    Medical Decision Making       57 MINUTES SPENT BY ME on the date of service doing chart review, history, exam, documentation & further activities per the note.      Data   ------------------------- PAST 24 HR DATA REVIEWED -----------------------------------------------

## 2024-03-06 NOTE — CONSULTS
Urologic Surgery Consultation Note  [facility]    Susan Puckett MRN# [id number]   Age: 53 year old YOB: 1970     Date of Admission:  3/2/2024    Reason for consult: Retention       Requesting physician: Dr. Guerra        Level of consult: Consult, follow and place orders           Assessment:   53 year old female with hx of liver transplant, CKDIII, and recent fall with seizure activities and SAH, multiple fractures.  Urology consulted for urinary retention.  She has a galloway in, that is draining. No prior urologic history  I discussed the etiology of her retention, multifactorial including possible neurogenic bladder dysfunction with recent head injury, severe constipation, narcotic use, and pelvic floor dysfunction(see gyn note).  She tolerates galloway, but would like to try TOV again, which is reasonable near discharge.  She will benefit from aggressive antegrade and retrograde bowel regimen.  She does not want CIC if fails TOV, she would prefer galloway.  She has follow up already scheduled with [facility] urologist. She can follow up with us here if she would like to or keep her OSH urology follow up.         Recommendations:   - Recommend ambulation, encourage mobility as tolerated  - Recommend aggressive antegrade and retrograde bowel regimen  - Limit narcotic use as tolerated, avoid polypharmacy, avoid anticholinergics if able  - Agree with outpatient PFPT  - TOV closer to discharge, if fails again she would like to have galloway replaced and follow up with urology outpatient.  - Urology to sign off. Please contact us if there are additional questions.          History of Present Illness:   CC: Retention     History is obtained from the patient    53 year old female with hx of liver transplant, CKDIII, and recent fall with seizure activities and SAH, multiple fractures.  Urology consulted for urinary retention.  No baseline urologic issues  Presented to OSH last months after fall and  seizures  Since then had retention  Very constipated, last BM today but was very small  She presented again to our institution due to poor pain control  Her pain was mostly in her hips/groin  Saw obgyn and found to have some PF dysfunction/myalgia, recommended PFPT          Past Medical History:     Past Medical History:   Diagnosis Date    Anemia     Anxiety     Cold sore     Depressive disorder     Diabetes (H)     Type 2 DM/No Insulin     E. coli UTI 11/19/2022    Encephalopathy     Esophageal varices without bleeding (H)     grade I    History of blood transfusion     10/2019    History of seizure     diabetic seizure    Infertility management 11/05/2015    Insomnia     Liver cirrhosis secondary to CUETO (H) 05/28/2020    Migraine     Pleural effusion     Thrombocytopenia (H24)     Thyroid disease              Past Surgical History:     Past Surgical History:   Procedure Laterality Date    APPENDECTOMY      1989    BENCH LIVER N/A 11/2/2022    Procedure: Bench liver;  Surgeon: Delbert Morgan MD;  Location: [facility] OR    COLONOSCOPY      1/2020 at [facility]     ENT SURGERY  1998    tempanoplasty    GI SURGERY      EGD August 2020 at [facility]     GYN SURGERY  2010    laparoscopic ablation    IR PARACENTESIS  6/8/2020    IR THORACENTESIS  6/6/2022    IR THORACENTESIS  1/11/2022    IR THORACENTESIS  12/2/2021    IR THORACENTESIS  11/29/2021    IR THORACENTESIS  11/23/2021    IR THORACENTESIS  11/12/2021    IR THORACENTESIS  11/4/2021    IR THORACENTESIS  11/1/2021    IR THORACENTESIS  10/29/2021    IR THORACENTESIS  10/28/2021    IR THORACENTESIS  10/27/2021    IR THORACENTESIS  10/24/2021    IR THORACENTESIS  10/22/2021    IR THORACENTESIS  10/5/2021    IR THORACENTESIS  9/22/2021    IR THORACENTESIS  9/19/2021    IR THORACENTESIS  9/17/2021    IR THORACENTESIS  8/31/2021    IR THORACENTESIS  8/17/2021    IR THORACENTESIS  8/13/2021    IR THORACENTESIS  8/10/2021    IR THORACENTESIS  6/24/2021    IR THORACENTESIS   2021    IR THORACENTESIS  2021    IR THORACENTESIS  2021    IR THORACENTESIS  2021    IR THORACENTESIS  3/31/2021    IR THORACENTESIS  2020    IR TRANSVEN INTRAHEPATIC PORTOSYST SHUNT  2021    MAMMOPLASTY REDUCTION BILATERAL      IA STAB PHELBECTOMY VARICOSE VEINS, LESS THAN 10 INCISIONS, ONE EXTREMITY      RNY gastric bypass  2006    retoocolic, retrogastric, [facility]    TONSILLECTOMY & ADENOIDECTOMY Bilateral     TRANSPLANT LIVER RECIPIENT  DONOR N/A 2022    Procedure: TRANSPLANT, LIVER, RECIPIENT,  DONOR;  Surgeon: Delbert Morgan MD;  Location:  OR    WISDOM TEETH EXTRACTION               Social History:     Social History     Socioeconomic History    Marital status:      Spouse name: Not on file    Number of children: Not on file    Years of education: Not on file    Highest education level: Bachelor's degree (e.g., BA, AB, BS)   Occupational History    Not on file   Tobacco Use    Smoking status: Never    Smokeless tobacco: Never   Vaping Use    Vaping Use: Never used   Substance and Sexual Activity    Alcohol use: Not Currently     Comment: Last drink was in 2017    Drug use: Never    Sexual activity: Not on file   Other Topics Concern    Parent/sibling w/ CABG, MI or angioplasty before 65F 55M? Not Asked   Social History Narrative    Not on file     Social Determinants of Health     Financial Resource Strain: Not on file   Food Insecurity: No Food Insecurity (2020)    Hunger Vital Sign     Worried About Running Out of Food in the Last Year: Never true     Ran Out of Food in the Last Year: Never true   Transportation Needs: No Transportation Needs (2020)    PRAPARE - Transportation     Lack of Transportation (Medical): No     Lack of Transportation (Non-Medical): No   Physical Activity: Insufficiently Active (2020)    Exercise Vital Sign     Days of Exercise per Week: 1 day     Minutes of Exercise per Session: 10 min    Stress: Stress Concern Present (11/5/2020)    [place] [employer] - Occupational Stress Questionnaire     Feeling of Stress : To some extent   Social Connections: Moderately Integrated (11/5/2020)    Social Connection and Isolation Panel [NHANES]     Frequency of Communication with Friends and Family: More than three times a week     Frequency of Social Gatherings with Friends and Family: Once a week     Attends Hoahaoism Services: More than 4 times per year     Active Member of Clubs or Organizations: No     Attends Club or Organization Meetings: Never     Marital Status:    Interpersonal Safety: Not At Risk (5/26/2023)    Humiliation, Afraid, Rape, and Kick questionnaire     Fear of Current or Ex-Partner: No     Emotionally Abused: No     Physically Abused: No     Sexually Abused: No   Housing Stability: Low Risk  (11/5/2020)    Housing Stability Vital Sign     Unable to Pay for Housing in the Last Year: No     Number of Places Lived in the Last Year: 1     Unstable Housing in the Last Year: No             Family History:     Family History   Problem Relation Age of Onset    Anesthesia Reaction Nephew         PONV    Bleeding Disorder No family hx of     Clotting Disorder No family hx of              Allergies:      Allergies   Allergen Reactions    Methylprednisolone Hives     Per patient, reaction occurred in 1999 or earlier. Broke out in round, flat hives in neck and chest area. No shortness of breath or swelling. Unknown if reaction occurred immediately after administration.     Adhesive Tape Itching    Oxycodone      slurring    Droperidol Anxiety    Nsaids Other (See Comments)     GI bleed.    Prednisone Anxiety, Hives and Rash     Per patient she has tolerated this since             Medications:   No current facility-administered medications on file prior to encounter.  calcium carbonate-vitamin D (OSCAL) 500-5 MG-MCG tablet, Take 1 tablet by mouth 2 times daily  cyanocobalamin  (VITAMIN B-12) 1000 MCG tablet, Take 1,000 mcg by mouth every 7 days on Wednesdays  escitalopram (LEXAPRO) 20 MG tablet, Take 30 mg by mouth daily  folic acid (FOLVITE) 1 MG tablet, Take 1 mg by mouth every morning  gabapentin (NEURONTIN) 100 MG capsule, Take 200 mg by mouth 3 times daily  levothyroxine (SYNTHROID/LEVOTHROID) 125 MCG tablet, Take 125 mcg by mouth daily  melatonin 5 MG tablet, Take 5 mg by mouth At Bedtime  metoclopramide (REGLAN) 5 MG tablet, Take 1 tablet (5 mg) by mouth 3 times daily as needed  naratriptan (AMERGE) 2.5 MG tablet, Take 2.5 mg by mouth at onset of headache for migraine  ondansetron (ZOFRAN ODT) 8 MG ODT tab, Take 1 tablet (8 mg) by mouth every 8 hours as needed for nausea  sitagliptin (JANUVIA) 100 MG tablet, Take 100 mg by mouth daily  tacrolimus (GENERIC) 1 MG capsule, Take 2 capsules (2 mg) by mouth every 12 hours  topiramate (TOPAMAX) 50 MG tablet, Take 100 mg by mouth at bedtime              Review of Systems:      All other review of systems negative, except for what is mentioned above        Physical Exam:   /64   Pulse 59   Temp 98.2  F (36.8  C)   Resp 16   SpO2 96%   GEN: AAO*3, not in acute distress, lying comfortably  HEENT: atraumatic, mucosa moist  CVS: normal heart rate, perfusing extremeties  RESP: no resp distress, satting well on RA  ABD: soft, nontender, nondistended  : galloway in place draining clear urine  EXT: Extremities atraumatic, grossly normal range of motion  NEURO/PSYCH: CN grossly normal, no focal weakness, normal mood and thought process              Data:     Recent Labs   Lab Test 03/06/24  0547 03/05/24  0552 03/04/24  1111 03/04/24  0755   WBC 4.0  --  4.0 5.2   HGB 9.9*  --  10.1* 11.7   CR 1.21* 1.20*  --  1.26*         CT Abdomen Pelvis w Contrast 01/11/2024    Impression  COMPARISON:  01/14/2023.    TECHNIQUE:  Images were obtained through the abdomen and pelvis following the administration of 75 mL IOHEXOL 350 MG/ML IV SOLN IV  contrast.    FINDINGS:    LOWER CHEST: Unremarkable.    LIVER: Postoperative changes of liver transplant. Unremarkable appearance of the transplanted liver. Patent hepatic and portal veins    GALLBLADDER AND BILIARY TREE: Cholecystectomy. Mild prominence of the intrahepatic bile ducts, likely reflecting postcholecystectomy reservoir effect.    PANCREAS: Unremarkable.    SPLEEN: Unremarkable.    ADRENALS: Unremarkable.    KIDNEYS, URETERS, AND BLADDER: Unremarkable. No hydronephrosis.    VESSELS: No abdominal aortic aneurysm.    BOWEL: Postoperative changes of Faustino-en-Y gastric bypass. No inflammatory changes or obstruction. The appendix is not seen but there are no inflammatory changes to suggest appendicitis.    REPRODUCTIVE ORGANS: No pelvic mass.    MESENTERY/PERITONEUM: No enlarged mesenteric lymph nodes. Prominent periportal lymph node is decreased size compared to prior exam, measuring 1.5 cm currently versus 1.9 cm previously (series 4 slice 20). No ascites or free air. No focal fluid collection.    RETROPERITONEUM: No adenopathy.    ABDOMINAL WALL/SOFT TISSUES: Unremarkable.    BONES: Unremarkable.    IMPRESSION:  1. No acute findings in the abdomen or pelvis.  2. Postoperative changes of liver transplant with unremarkable appearance of the transplanted liver.  3. Decreased size of the previously seen prominent periportal lymph node.           Discussed with staff surgeon Dr. Porter, who agrees with the assessment and plans    Stuart Sellers MD   PGY-2 Urology  [facility]

## 2024-03-06 NOTE — CONSULTS
Hepatology Consultation    Susan Puckett   MRN# [id number]     Age: 53 year old YOB: 1970       Referring provider: Lacy Hudson  Attending Hepatologist: Dr. Sadler   Consult requested for: post liver transplant       Assessment and Recommendation:   Assessment:   Ms. 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       Recommendations:   # DDLT 11/2/22 for MAS  # Immunosuppression  - intensive monitoring for toxicity  - Current immunosuppression:  tacrolimus 2 mg BID  - Trough level: 3/3 was 6.6, level today is 4.4 (12 hour) Goal trough: 3-5  - creatinine 1.21  - no change in tacrolimus dose and her transplant coordinator is aware of her inpatient status.     Will sign off.     - follow up with Dr. Cook as scheduled 6/18/24    Plan of care discussed with Dr. Sadler. The above note reflects our joint plan.     Thank you for the opportunity to be involved in Susan Puckett care. Please call with any questions or concerns.     CAIN Mccomrick, CNP  Inpatient Hepatology [facility]               History of Present Illness:   Susan Puckett is a 53 year old female with a history of DDLT 11/2/22 for [facility] with a post operative course complicated by hemolytic anemia (resolved), nausea, medication induced hyperkalemia, DM II, AILIN who was admitted with pelvic floor pain, urinary retention and right buttock pain following a fall in February. She reports the pain has increased  and does not improve with movement. The pain in her right buttock does radiate a short distance down her upper hamstring. She does have a history of a nerve block for nerve leg pain but she states this was absent prior to fall.      She has had nausea since transplant, now worse since fall. She has  been able to take her medications without difficulty. She has been on tacrolimus 2 mg BID.               Past Medical History:     Past Medical History:   Diagnosis Date    Anemia     Anxiety     Cold sore     Depressive disorder     Diabetes (H)     Type 2 DM/No Insulin     E. coli UTI 11/19/2022    Encephalopathy     Esophageal varices without bleeding (H)     grade I    History of blood transfusion     10/2019    History of seizure     diabetic seizure    Infertility management 11/05/2015    Insomnia     Liver cirrhosis secondary to CUETO (H) 05/28/2020    Migraine     Pleural effusion     Thrombocytopenia (H24)     Thyroid disease               Past Surgical History:     Past Surgical History:   Procedure Laterality Date    APPENDECTOMY      1989    BENCH LIVER N/A 11/2/2022    Procedure: Bench liver;  Surgeon: Delbert Morgan MD;  Location: [facility] OR    COLONOSCOPY      1/2020 at [facility]     ENT SURGERY  1998    tempanoplasty    GI SURGERY      EGD August 2020 at [facility]     GYN SURGERY  2010    laparoscopic ablation    IR PARACENTESIS  6/8/2020    IR THORACENTESIS  6/6/2022    IR THORACENTESIS  1/11/2022    IR THORACENTESIS  12/2/2021    IR THORACENTESIS  11/29/2021    IR THORACENTESIS  11/23/2021    IR THORACENTESIS  11/12/2021    IR THORACENTESIS  11/4/2021    IR THORACENTESIS  11/1/2021    IR THORACENTESIS  10/29/2021    IR THORACENTESIS  10/28/2021    IR THORACENTESIS  10/27/2021    IR THORACENTESIS  10/24/2021    IR THORACENTESIS  10/22/2021    IR THORACENTESIS  10/5/2021    IR THORACENTESIS  9/22/2021    IR THORACENTESIS  9/19/2021    IR THORACENTESIS  9/17/2021    IR THORACENTESIS  8/31/2021    IR THORACENTESIS  8/17/2021    IR THORACENTESIS  8/13/2021    IR THORACENTESIS  8/10/2021    IR THORACENTESIS  6/24/2021    IR THORACENTESIS  6/19/2021    IR THORACENTESIS  6/17/2021    IR THORACENTESIS  6/16/2021    IR THORACENTESIS  6/13/2021    IR THORACENTESIS  3/31/2021    IR THORACENTESIS  7/30/2020    IR  TRANSVEN INTRAHEPATIC PORTOSYST SHUNT  2021    MAMMOPLASTY REDUCTION BILATERAL      MS STAB PHELBECTOMY VARICOSE VEINS, LESS THAN 10 INCISIONS, ONE EXTREMITY      RNY gastric bypass  2006    retoocolic, retrogastric, Kaycee Daleyllet    TONSILLECTOMY & ADENOIDECTOMY Bilateral     TRANSPLANT LIVER RECIPIENT  DONOR N/A 2022    Procedure: TRANSPLANT, LIVER, RECIPIENT,  DONOR;  Surgeon: Delbert Morgan MD;  Location: [facility] OR    WISDOM TEETH EXTRACTION                Social History:     Social History     Tobacco Use    Smoking status: Never    Smokeless tobacco: Never   Substance Use Topics    Alcohol use: Not Currently     Comment: Last drink was in 2017             Family History:   The   I have reviewed this patient's family history and updated it with pertinent information if needed.  Family History   Problem Relation Age of Onset    Anesthesia Reaction Nephew         PONV    Bleeding Disorder No family hx of     Clotting Disorder No family hx of                   Immunizations:     Immunization History   Administered Date(s) Administered    COVID-19 12+ () (Pfizer) 2023    COVID-19 Bivalent 12+ (Pfizer) 2023    COVID-19 MONOVALENT 12+ (Pfizer) 2021, 2021, 2021    Hepatitis B, Adult 10/24/2006, 2006, 2007    Influenza Vaccine 18-64 (Flublok) 2023    Influenza Vaccine >6 months,quad, PF 2018, 10/15/2019, 10/05/2020, 2021    Influenza, seasonal, injectable, PF 10/15/2019    Mantoux Tuberculin Skin Test 2022    Pneumococcal 23 valent 2019    TD,PF 7+ (Tenivac) 2002    TDAP Vaccine (Boostrix) 10/03/2012    Twinrix A/B 10/31/2019, 10/31/2019, 2020, 2020, 2021            Allergies:     Allergies   Allergen Reactions    Methylprednisolone Hives     Per patient, reaction occurred in  or earlier. Broke out in round, flat hives in neck and chest area. No shortness of breath or swelling.  Unknown if reaction occurred immediately after administration.     Adhesive Tape Itching    Oxycodone      slurring    Droperidol Anxiety    Nsaids Other (See Comments)     GI bleed.    Prednisone Anxiety, Hives and Rash     Per patient she has tolerated this since             Medications:   @  Medications Prior to Admission   Medication Sig Dispense Refill Last Dose    calcium carbonate-vitamin D (OSCAL) 500-5 MG-MCG tablet Take 1 tablet by mouth 2 times daily   Past Week    cyanocobalamin (VITAMIN B-12) 1000 MCG tablet Take 1,000 mcg by mouth every 7 days on Wednesdays   Past Week    escitalopram (LEXAPRO) 20 MG tablet Take 30 mg by mouth daily   Past Week    folic acid (FOLVITE) 1 MG tablet Take 1 mg by mouth every morning   Past Week    gabapentin (NEURONTIN) 100 MG capsule Take 200 mg by mouth 3 times daily   Past Week    levothyroxine (SYNTHROID/LEVOTHROID) 125 MCG tablet Take 125 mcg by mouth daily   Past Week    melatonin 5 MG tablet Take 5 mg by mouth At Bedtime   Past Week    metoclopramide (REGLAN) 5 MG tablet Take 1 tablet (5 mg) by mouth 3 times daily as needed 100 tablet 3 Past Month    naratriptan (AMERGE) 2.5 MG tablet Take 2.5 mg by mouth at onset of headache for migraine   Past Month    ondansetron (ZOFRAN ODT) 8 MG ODT tab Take 1 tablet (8 mg) by mouth every 8 hours as needed for nausea 30 tablet 0 Past Month    sitagliptin (JANUVIA) 100 MG tablet Take 100 mg by mouth daily   Past Week    tacrolimus (GENERIC) 1 MG capsule Take 2 capsules (2 mg) by mouth every 12 hours 540 capsule 3 Past Week    topiramate (TOPAMAX) 50 MG tablet Take 100 mg by mouth at bedtime   Past Week   @          Review of Systems:    ROS: 10 point ROS neg other than the symptoms noted above in the HPI.          Physical Exam:   Blood pressure 100/64, pulse 59, temperature 98.2  F (36.8  C), resp. rate 16, SpO2 96%, not currently breastfeeding. There is no height or weight on file to calculate BMI.    General: In no acute  "distress, no facial muscle wasting  Neuro: AOx3, No asterixis  HEENT: PERRL Noscleral icterus, Nooral lesions  Lymph:  Nocervical lymphadenoapthy  CV: Skin warm and dry  Lungs:  Respirations even and nonlabored on room air  Abd: Nondistended  Extrem: Noperipehral edema  Skin: Nojaundice  Psych: mild agitated         Data:     Lab Results   Component Value Date    WBC 4.0 03/06/2024    WBC 4.0 07/05/2021     Lab Results   Component Value Date    RBC 3.12 03/06/2024    RBC 3.55 07/05/2021     Lab Results   Component Value Date    HGB 9.9 03/06/2024    HGB 11.5 07/05/2021     Lab Results   Component Value Date    HCT 31.6 03/06/2024    HCT 34.4 07/05/2021     No components found for: \"MCT\"  Lab Results   Component Value Date     03/06/2024    MCV 97 07/05/2021     Lab Results   Component Value Date    MCH 31.7 03/06/2024    MCH 32.4 07/05/2021     Lab Results   Component Value Date    MCHC 31.3 03/06/2024    MCHC 33.4 07/05/2021     Lab Results   Component Value Date    RDW 12.6 03/06/2024    RDW 14.7 07/05/2021     Lab Results   Component Value Date     03/06/2024     07/05/2021       Last Basic Metabolic Panel:  Lab Results   Component Value Date     03/06/2024     07/05/2021      Lab Results   Component Value Date    POTASSIUM 4.9 03/06/2024    POTASSIUM 4.6 06/06/2023    POTASSIUM 3.0 07/05/2021     Lab Results   Component Value Date    CHLORIDE 110 03/06/2024    CHLORIDE 111 06/06/2023    CHLORIDE 104 09/15/2022    CHLORIDE 107 07/05/2021     Lab Results   Component Value Date    MAURI 8.0 03/06/2024    MAURI 7.9 07/05/2021     Lab Results   Component Value Date    CO2 24 03/06/2024    CO2 25 06/06/2023    CO2 29 07/05/2021     Lab Results   Component Value Date    BUN 15.7 03/06/2024    BUN 16 06/06/2023    BUN 11 07/05/2021     Lab Results   Component Value Date    CR 1.21 03/06/2024    CR 1.10 07/05/2021     Lab Results   Component Value Date     03/06/2024     " "03/06/2024     06/06/2023     07/05/2021       Liver Function Studies -   Recent Labs   Lab Test 03/06/24  0547   PROTTOTAL 4.7*   ALBUMIN 2.4*   BILITOTAL 0.2   ALKPHOS 117   AST 15   ALT 7       Lab Results   Component Value Date    INR 1.32 11/04/2022           Previous work-up:   Lab Results   Component Value Date    HEPBANG Nonreactive 11/01/2022    HBCAB Nonreactive 11/01/2022    AUSAB 1.40 11/01/2022    HCVAB Nonreactive 11/01/2022    JUSTINE 309 11/06/2023    IRONSAT 56 (H) 11/06/2023    TSH 0.29 (L) 12/01/2022    CHOL 125 01/24/2024    HDL 49 (L) 01/24/2024    LDL 59 01/24/2024    TRIG 86 01/24/2024    A1C 5.7 (H) 11/02/2022      No results found for: \"SPECDES\", \"LDRESULTS\"         Previous Endoscopy:     No results found. However, due to the size of the patient record, not all encounters were searched. Please check Results Review for a complete set of results.  No results found. However, due to the size of the patient record, not all encounters were searched. Please check Results Review for a complete set of results.  No results found. However, due to the size of the patient record, not all encounters were searched. Please check Results Review for a complete set of results.      IMAGING:  Results for orders placed during the hospital encounter of 01/17/23    US Liver Transplant    Narrative  EXAMINATION: US LIVER TRANSPLANT  1/17/2023 8:49 PM    CLINICAL HISTORY: RUQ pain, liver transplant 2 months ago.    COMPARISON: Ultrasound: 12/4/2022    PROCEDURE COMMENTS: Ultrasound of the transplant liver with color and  spectral Doppler was performed.    FINDINGS:  The transplant liver demonstrates normal echogenicity without focal  mass. Free fluid appreciated along the falciform ligament. 1.8 x 0.8 x  1.7 cm hypoechoic fluid collection adjacent to the right liver. Within  the right liver there is a 1.6 x 0.6 x 1.4 cm simple cyst.  There is no bile duct dilatation. The common duct measures 5 mm. " The  gallbladder is surgically absent.    The main and branch portal veins are patent with antegrade flow into  the liver.    Visualized portion of the pancreas is grossly unremarkable. The  abdominal aorta and IVC are normal in appearance. The spleen measures  13.3 cm.    The right kidney measures 9 cm. The left kidney is not seen. No  hydronephrosis, hydroureter, or shadowing renal calculus.    LIVER DOPPLER:  Splenic vein:  Patent continuous normal antegrade direction flow  towards the liver, 18 cm/sec.  Extrahepatic portal vein:  Patent continuous antegrade flow, 30  cm/sec.  Portal vein at anastomosis: Patent continuous antegrade flow, 42  cm/sec.  Intrahepatic portal vein:  Patent continuous antegrade flow, 37  cm/sec.  Right portal vein flow is antegrade, measuring 44 cm/sec.  Left portal vein flow is antegrade, measuring 25 cm/sec.    Inferior vena cava patent with flow toward the heart throughout..  IVC above anastomosis:  123 cm/sec.  IVC at anastomosis:  64 cm/sec.  Intrahepatic IVC:  36 cm/sec.  Extrahepatic IVC:  42 cm/sec.    Right, mid, left hepatic veins: Patent with flow towards the inferior  vena cava.    Extrahepatic hepatic artery: Low resistance waveform with flow towards  the liver. 42 cm/sec with resistive index 0.62.  Right hepatic artery: 31 cm/sec with resistive index 0.71.  Left hepatic artery: 41 cm/sec with resistive index 0.71.    Visualized portions of the aorta are unremarkable.    Impression  Impression:  1. Normal grayscale appearance of the transplanted liver. Free fluid  appreciated along the falciform ligament with 1.8 cm hypoechoic fluid  collection adjacent to the right liver, likely postoperative seroma  versus evolving hematoma.  2. Patent Doppler vasculature of the transplant liver.    I have personally reviewed the examination and initial interpretation  and I agree with the findings.    KONRAD MORLEY MD      SYSTEM ID:  [id number]    Results for orders placed during the  hospital encounter of 11/01/22    XR Chest Port 1 View    Narrative  EXAM: XR CHEST PORT 1 VIEW  11/2/2022 5:46 PM    HISTORY:  eval central line placement    COMPARISON:  Chest radiograph 11/1/2022    FINDINGS:  Portable AP supine view of the chest. Right IJ central venous catheter  terminating in the high SVC. Midline trachea. Cardiomegaly and  indistinct pulmonary vasculature. No pneumothorax or pleural effusion.  Increased bilateral perihilar opacities, left greater than right.  Fluid in the right minor fissure. Visualized upper abdomen is  unremarkable. No acute osseous abnormality.    Impression  IMPRESSION:    1. Right IJ central venous catheter terminating in the high SVC. The  2. Cardiomegaly, indistinct pulmonary vasculature, increased bilateral  perihilar opacities, left greater than right. Right minor fissure  fluid. Findings are likely representing pulmonary edema with or  without superimposed atelectasis.    I have personally reviewed the examination and initial interpretation  and I agree with the findings.    CELESTE CANNON MD      SYSTEM ID:  [id number]    No results found for this or any previous visit.    No results found for this or any previous visit.    XR Abdomen 1 View    Result Date: 3/2/2024  Exam: Abdominal radiograph 3/2/2024 7:57 PM History: eval stool burden Comparison: 1/19/2023. Findings: Frontal upright view(s) of the abdomen. Moderate colonic stool burden. Nonobstructive bowel gas pattern. No pneumoperitoneum, pneumatosis or portal venous gas. Surgical clips project over the upper abdomen. The lung bases are clear.     Impression: Moderate stool burden, no obstruction. I have personally reviewed the examination and initial interpretation and I agree with the findings. AVINASH WINTERS DO   SYSTEM ID:  [id number]    MR Pelvis Bone wo Contrast    Result Date: 3/2/2024  EXAM: MR PELVIC BONES W/O CONTRAST LOCATION: [facility] DATE: 3/2/2024  INDICATION: Severe pain, debilitating, inability to bear weight. COMPARISON: None. TECHNIQUE: Unenhanced. FINDINGS: HIPS: -Both hips are negative for fracture or avascular necrosis. No significant effusion on either side. No gross stable tearing on large field-of-view imaging. MUSCLES AND TENDONS: -Gluteal: No tendon tear or tendinopathy. No trochanteric bursitis.  -Proximal hamstring: Left-sided hamstring tendinopathy without tearing. -Iliopsoas: No tendon tear or tendinopathy. No bursitis. BONES: -No fracture or concerning marrow replacing lesion. -SI joints are normal. Visualized lumbar spine is normal. SOFT TISSUES: -There is some intermediate signal abnormality within the gluteus medius musculature bilaterally which could be due to muscle strain but there is no tearing. There is mild nonspecific edema within the subcutaneous soft tissues lateral to both hips. Findings are fairly symmetric. No evidence for organized fluid collection. INTRAPELVIC CONTENTS: -Visualized portions are normal.     IMPRESSION: 1.  Both hips are negative for fracture or avascular necrosis. No significant effusion on either side. No evidence for gross labral tearing on large field-of-view imaging. 2.  Bony pelvis negative for fracture or bone marrow signal abnormality. 3.  Mild left-sided hamstring tendinopathy without tearing. 4.  Mild intramuscular signal abnormality within the gluteus medius bilaterally. 5.  Advanced fatty infiltration and atrophy of the paraspinal musculature bilaterally. 6.  Mild edema within the subcutaneous soft tissues lateral to both hips.    Lumbar spine MRI w/o contrast    Result Date: 2/29/2024  EXAM: MR LUMBAR SPINE W/O CONTRAST LOCATION: [facility] DATE: 2/29/2024 INDICATION:  Back pain, bilateral lower extremity numbness COMPARISON: None. TECHNIQUE: Routine Lumbar Spine MRI without IV contrast. FINDINGS: Nomenclature is based on 5 lumbar type vertebral bodies  with L5-S1 defined on image 30 of series 100. Alignment is normal. Vertebral body heights are normal.  Normal marrow signal. Normal distal spinal cord and cauda equina with conus medullaris at L1. Mild bilateral psoas atrophy. Marked dorsal paraspinal muscular atrophy. Visualized intra-abdominal structures are unremarkable. T12-L1: Unremarkable appearance of the disc. Mild bilateral facet arthropathy. No significant canal or foraminal stenosis. L1-L2: Unremarkable appearance of the disc. Mild bilateral facet arthropathy. No significant canal or foraminal stenosis. L2-L3: Unremarkable appearance of the disc. Mild bilateral facet arthropathy. No significant canal or foraminal stenosis. L3-L4: Mild bulge small superimposed central protrusion. No significant disc height loss. Mild bilateral facet arthropathy. No significant canal or foraminal stenosis. L4-L5: Mild bulge. Mild disc height loss. Mild bilateral facet arthropathy. No significant canal or foraminal stenosis. L5-S1: Mild bulge. Mild disc height loss. Mild bilateral facet arthropathy. No significant canal or foraminal stenosis.     IMPRESSION: 1.  Mild lumbar degenerative changes without significant canal or foraminal stenosis.    CT Pelvis Bone wo Contrast    Result Date: 2/29/2024  EXAM: CT PELVIS BONE WO CONTRAST LOCATION: [facility] DATE: 2/29/2024 INDICATION: Trauma and pain, please protocol through both hips to prox 1/3 of femur. COMPARISON: None. TECHNIQUE: CT scan of the pelvis was performed without IV contrast. Multiplanar reformats were obtained. Dose reduction techniques were used. CONTRAST: None. FINDINGS: PELVIS ORGANS: Moderate to marked distention of the urinary bladder. There is gas in the urinary bladder. MUSCULOSKELETAL: BONES AND JOINTS: No evidence of fracture or effusion. No malalignment. MUSCLES AND TENDONS: No muscle atrophy. No gross tendon pathology. SOFT TISSUES: No hematoma, cyst or mass.  No edema.     IMPRESSION: 1.  Moderate to marked distention of the urinary bladder. There is gas in the urinary bladder. This could be related to recent catheterization or infection so clinical correlation is needed. 2.  Otherwise negative. No evidence of fracture.     CT Lumbar Spine w/o Contrast    Result Date: 2/29/2024  EXAM: CT LUMBAR SPINE W/O CONTRAST  2/29/2024 7:52 PM HISTORY: trauma   COMPARISON:  Abdomen CT 1/18/2023 TECHNIQUE: Using multidetector thin collimation helical acquisition technique, axial, sagittal and coronal 3 mm thickness CT reconstructions were obtained through the lumbar spine without intravenous contrast.  Images were viewed in bone and soft tissue windows. FINDINGS: There are 5 lumbar type vertebrae, used for the purposes of this dictation. Normal vertebral body alignment. No significant disc space narrowing. No acute fracture. No suspicious osseous lesion. Mild multilevel facet arthropathy. Findings on a level by level basis are as follows: L1-L2: No spinal canal or neural foraminal stenosis L2-L3: No spinal canal or neural foraminal stenosis. L3-L4: No spinal canal or neural foraminal stenosis. L4-L5: No spinal canal or neural foraminal stenosis. L5-S1: No spinal canal or neural foraminal stenosis. Paraspinal muscle atrophy.     IMPRESSION: 1. No acute fracture or traumatic subluxation. 2. Mild multilevel lumbar spondylosis. No significant spinal canal or neural foraminal stenosis at any level. I have personally reviewed the examination and initial interpretation and I agree with the findings. DEBORAH LUCERO MD   SYSTEM ID:  [id number]    XR Hand Left G/E 3 Views    COMPARISON:  02/09/2024. FINDINGS:  Again demonstrated is a comminuted, intra-articular fracture at the base of the first metacarpal. There may be slight increased displacement of a fracture fragment on the oblique view. Otherwise, no significant change.

## 2024-03-07 ENCOUNTER — TELEPHONE (OUTPATIENT)
Dept: TRANSPLANT | Facility: CLINIC | Age: 54
End: 2024-03-07
Payer: COMMERCIAL

## 2024-03-07 PROCEDURE — 250N000013 HC RX MED GY IP 250 OP 250 PS 637: Performed by: STUDENT IN AN ORGANIZED HEALTH CARE EDUCATION/TRAINING PROGRAM

## 2024-03-07 PROCEDURE — 250N000013 HC RX MED GY IP 250 OP 250 PS 637: Performed by: INTERNAL MEDICINE

## 2024-03-07 PROCEDURE — 99223 1ST HOSP IP/OBS HIGH 75: CPT | Performed by: PSYCHIATRY & NEUROLOGY

## 2024-03-07 PROCEDURE — G0378 HOSPITAL OBSERVATION PER HR: HCPCS

## 2024-03-07 PROCEDURE — 99232 SBSQ HOSP IP/OBS MODERATE 35: CPT | Performed by: INTERNAL MEDICINE

## 2024-03-07 PROCEDURE — 250N000013 HC RX MED GY IP 250 OP 250 PS 637

## 2024-03-07 PROCEDURE — 250N000012 HC RX MED GY IP 250 OP 636 PS 637

## 2024-03-07 RX ORDER — HYDROCODONE BITARTRATE AND ACETAMINOPHEN 5; 325 MG/1; MG/1
2 TABLET ORAL ONCE
Status: COMPLETED | OUTPATIENT
Start: 2024-03-07 | End: 2024-03-07

## 2024-03-07 RX ADMIN — Medication 25 MG: at 23:00

## 2024-03-07 RX ADMIN — HYDROCODONE BITARTRATE AND ACETAMINOPHEN 1 TABLET: 5; 325 TABLET ORAL at 08:02

## 2024-03-07 RX ADMIN — POLYETHYLENE GLYCOL 3350 17 G: 17 POWDER, FOR SOLUTION ORAL at 07:52

## 2024-03-07 RX ADMIN — GABAPENTIN 300 MG: 300 CAPSULE ORAL at 13:04

## 2024-03-07 RX ADMIN — Medication 1 TABLET: at 19:43

## 2024-03-07 RX ADMIN — Medication 1 TABLET: at 07:51

## 2024-03-07 RX ADMIN — SENNOSIDES 8.6 MG: 8.6 TABLET, FILM COATED ORAL at 19:49

## 2024-03-07 RX ADMIN — METHOCARBAMOL 500 MG: 500 TABLET ORAL at 07:51

## 2024-03-07 RX ADMIN — HYDROCODONE BITARTRATE AND ACETAMINOPHEN 1 TABLET: 5; 325 TABLET ORAL at 00:20

## 2024-03-07 RX ADMIN — GLYCERIN, PETROLATUM, PHENYLEPHRINE HCL, PRAMOXINE HCL: 144; 2.5; 10; 15 CREAM TOPICAL at 13:05

## 2024-03-07 RX ADMIN — SENNOSIDES AND DOCUSATE SODIUM 1 TABLET: 8.6; 5 TABLET ORAL at 19:47

## 2024-03-07 RX ADMIN — TACROLIMUS 2 MG: 1 CAPSULE ORAL at 17:21

## 2024-03-07 RX ADMIN — ESCITALOPRAM OXALATE 20 MG: 20 TABLET ORAL at 07:51

## 2024-03-07 RX ADMIN — GABAPENTIN 300 MG: 300 CAPSULE ORAL at 07:52

## 2024-03-07 RX ADMIN — POLYETHYLENE GLYCOL 3350 17 G: 17 POWDER, FOR SOLUTION ORAL at 19:43

## 2024-03-07 RX ADMIN — ACETAMINOPHEN 650 MG: 325 TABLET, FILM COATED ORAL at 15:29

## 2024-03-07 RX ADMIN — SENNOSIDES 8.6 MG: 8.6 TABLET, FILM COATED ORAL at 07:51

## 2024-03-07 RX ADMIN — HYDROXYZINE HYDROCHLORIDE 25 MG: 25 TABLET, FILM COATED ORAL at 11:49

## 2024-03-07 RX ADMIN — FOLIC ACID 1 MG: 1 TABLET ORAL at 07:52

## 2024-03-07 RX ADMIN — HYDROCODONE BITARTRATE AND ACETAMINOPHEN 2 TABLET: 5; 325 TABLET ORAL at 13:08

## 2024-03-07 RX ADMIN — GABAPENTIN 300 MG: 300 CAPSULE ORAL at 19:43

## 2024-03-07 RX ADMIN — ACETAMINOPHEN 650 MG: 325 TABLET, FILM COATED ORAL at 11:50

## 2024-03-07 RX ADMIN — METHOCARBAMOL 500 MG: 500 TABLET ORAL at 15:29

## 2024-03-07 RX ADMIN — LEVOTHYROXINE SODIUM 125 MCG: 125 TABLET ORAL at 07:51

## 2024-03-07 RX ADMIN — METHOCARBAMOL 500 MG: 500 TABLET ORAL at 11:49

## 2024-03-07 RX ADMIN — SITAGLIPTIN 100 MG: 100 TABLET, FILM COATED ORAL at 08:05

## 2024-03-07 RX ADMIN — TACROLIMUS 2 MG: 1 CAPSULE ORAL at 07:51

## 2024-03-07 RX ADMIN — METHOCARBAMOL 500 MG: 500 TABLET ORAL at 19:43

## 2024-03-07 RX ADMIN — GLYCERIN, PETROLATUM, PHENYLEPHRINE HCL, PRAMOXINE HCL: 144; 2.5; 10; 15 CREAM TOPICAL at 08:02

## 2024-03-07 RX ADMIN — TOPIRAMATE 100 MG: 25 TABLET, FILM COATED ORAL at 23:00

## 2024-03-07 RX ADMIN — GLYCERIN, PETROLATUM, PHENYLEPHRINE HCL, PRAMOXINE HCL: 144; 2.5; 10; 15 CREAM TOPICAL at 19:50

## 2024-03-07 ASSESSMENT — ACTIVITIES OF DAILY LIVING (ADL)
ADLS_ACUITY_SCORE: 47
ADLS_ACUITY_SCORE: 46
ADLS_ACUITY_SCORE: 46
DEPENDENT_IADLS:: INDEPENDENT
ADLS_ACUITY_SCORE: 46
ADLS_ACUITY_SCORE: 46
ADLS_ACUITY_SCORE: 47
ADLS_ACUITY_SCORE: 46
ADLS_ACUITY_SCORE: 47
ADLS_ACUITY_SCORE: 46
ADLS_ACUITY_SCORE: 47
ADLS_ACUITY_SCORE: 46
ADLS_ACUITY_SCORE: 46
ADLS_ACUITY_SCORE: 47
ADLS_ACUITY_SCORE: 46
ADLS_ACUITY_SCORE: 47

## 2024-03-07 NOTE — PLAN OF CARE
Goal Outcome Evaluation:      Plan of Care Reviewed With: patient, spouse    Overall Patient Progress: improvingOverall Patient Progress: improving       VS: /74 (BP Location: Right arm, Patient Position: Semi-Courtney's, Cuff Size: Adult Regular)   Pulse 61   Temp 98  F (36.7  C) (Oral)   Resp 16   SpO2 98%      O2: SpO2 > 90% on RA. Denies SOB/chest pain.    Output: Rossi draining adequately.    Last BM: 3/6   Activity: Independent in room   Skin: Scattered bruising    Pain: Managed with PRN Norco, tylenol, atarax, and hot packs    CMS: A&Ox4. Denies N/T.    Dressing: None   Diet: Regular    LDA: R PIV SL   Equipment: Personal belongings    Plan: Pending    Additional Info: Seizure pads in place

## 2024-03-07 NOTE — PLAN OF CARE
Goal Outcome Evaluation:      Plan of Care Reviewed With: patient    Overall Patient Progress: improvingOverall Patient Progress: improving    VS: VSS   O2: SpO2 > 90% on RA. Denies SOB/chest pain.    Output: Rossi draining adequately.    Last BM: 3/6   Activity: Independent in room   Skin: Scattered bruising    Pain: Managed with PRN Norco and hot packs    CMS: A&Ox4.    Dressing: None. Cast left arm/hand   Diet: Regular    LDA: R PIV SL   Equipment: Personal belongings    Plan: Pending. Cont POC   Additional Info: Seizure precautions

## 2024-03-07 NOTE — UTILIZATION REVIEW
Concurrent stay review; Secondary Review Determination     Under the authority of the Utilization Management Committee, the utilization review process indicated a secondary review on the above patient.  The review outcome is based on review of the medical records, discussions with staff, and applying clinical experience noted on the date of the review.          (x) Observation Status Appropriate - Concurrent stay review    RATIONALE FOR DETERMINATION   53-year-old female with cirrhosis status post liver transplant on immunosuppressants, diabetes, generalized anxiety, chronic migraine headaches, CKD stage III, recent fall with rib fractures who was admitted with pelvic floor pain, opioid associated constipation and urinary retention.  Recent admission for similar symptoms.  Extensive imaging and consulting has been completed.  Consulting services of urology, OB/GYN and hepatology has seen the patient and an outpatient plan of care has been outlined.  Orthopedic consult also requested by the patient for her tendinopathy.  Again, outpatient follow-up has been recommended.  Anticipate discharge later today.    Patient is clinically improving and there is no clear indication to change patient's status to inpatient. The severity of illness, intensity of service provided, expected LOS and risk for adverse outcome make the care appropriate for observation.    This document was produced using voice recognition software     The information on this document is developed by the utilization review team in order for the business office to ensure compliance.  This only denotes the appropriateness of proper admission status and does not reflect the quality of care rendered.         The definitions of Inpatient Status and Observation Status used in making the determination above are those provided in the CMS Coverage Manual, Chapter 1 and Chapter 6, section 70.4.      Sincerely,   Shelby Quick MD  Utilization Review  Physician  Advisor  [facility].

## 2024-03-07 NOTE — TELEPHONE ENCOUNTER
Transplant Social Work Services Phone Call    Data: Susan is s/p liver transplant.   Intervention: I received a voice message from Susan. In the VM she was upset and tearful. She asked for a call back. I called Susan and she provided this writer with an update regarding her current hospitalization. She was tearful on the phone and voiced that she does not believe that the medical team is catching everything. I validated her worry that she has been hospitalized a few times, which is anxiety producing. I asked if she is able to connect with her therapist outpatient. She agreed to do so. I asked her to connect with her team on [facility] to discuss her concerns. She also agreed to do so. No other questions or concerns at this time.   Assessment: Susan presented as distressed with her care and medical presentation.   Education provided by : Resources available. I indicated that the hospital does have health psychology and [facility]. I reported that these may not be available pending on when she discharges.   Plan:Susan has my number for further questions/concerns/support.      RENARD Alvarado, [facility]  Liver Transplant   [facility]  Phone: [phone]

## 2024-03-07 NOTE — PLAN OF CARE
Goal Outcome Evaluation:      Plan of Care Reviewed With: patient    Overall Patient Progress: improvingOverall Patient Progress: improving  Pt remains alert and oriented x 4 and able to make her needs known, Narco given x 2, tylenol x 1 and atarax x 1 for pain and anxiety. Pt was crying because she stated she thinks her pain is not being managed well, provider was updated with additional x 1 Narco,  was called to see the pt per pt request, Galloway is removed per order, bladder scan done with obtained 224 urine after galloway removal, pt has not voided yet, encouraged pt to call after voiding. Bladder scan done again with an obtain output of 519, straight catherization done with 600 ml urine obtained Will continue plan of care

## 2024-03-07 NOTE — CONSULTS
Care Management Initial Consult    General Information  Assessment completed with: Patient,    Type of CM/SW Visit: Offer D/C Planning    Primary Care Provider verified and updated as needed: Yes (MariajoseRefugioHarleen A [phone] [id number] [facility] [id number])   Readmission within the last 30 days: no previous admission in last 30 days      Reason for Consult: discharge planning  Advance Care Planning: Advance Care Planning Reviewed: present on chart        Communication Assessment  Patient's communication style: spoken language (English or Bilingual)        Cognitive  Cognitive/Neuro/Behavioral: .WDL except  Level of Consciousness: alert  Arousal Level: opens eyes spontaneously  Orientation: oriented x 4  Mood/Behavior: anxious  Best Language: 0 - No aphasia  Speech: spontaneous, clear, logical    Living Environment:   People in home: spouse     Current living Arrangements: house      Able to return to prior arrangements: yes     Family/Social Support:  Care provided by: self, spouse/significant other  Provides care for: no one  Marital Status:     [place]       Description of Support System: Supportive    Support Assessment: Adequate family and caregiver support    Current Resources:   Patient receiving home care services: No     Community Resources: None  Equipment currently used at home:  cane, walker  Supplies currently used at home:  None    Employment/Financial:  Employment Status:          Financial Concerns:   None    Does the patient's insurance plan have a 3 day qualifying hospital stay waiver?  No    Lifestyle & Psychosocial Needs:  Social Determinants of Health     Food Insecurity: No Food Insecurity (11/5/2020)    Hunger Vital Sign     Worried About Running Out of Food in the Last Year: Never true     Ran Out of Food in the Last Year: Never true   Depression: Not at risk (1/30/2023)    PHQ-2     PHQ-2 Score: 2   Recent Concern: Depression - At risk (1/20/2023)    PHQ-2     PHQ-2  Score: 3   Housing Stability: Low Risk  (11/5/2020)    Housing Stability Vital Sign     Unable to Pay for Housing in the Last Year: No     Number of Places Lived in the Last Year: 1     Unstable Housing in the Last Year: No   Tobacco Use: Low Risk  (3/2/2024)    Patient History     Smoking Tobacco Use: Never     Smokeless Tobacco Use: Never     Passive Exposure: Not on file   Financial Resource Strain: Not on file   Alcohol Use: Not At Risk (11/5/2020)    AUDIT-C     Frequency of Alcohol Consumption: Never     Average Number of Drinks: Not on file     Frequency of Binge Drinking: Never   Transportation Needs: No Transportation Needs (11/5/2020)    PRAPARE - Transportation     Lack of Transportation (Medical): No     Lack of Transportation (Non-Medical): No   Physical Activity: Insufficiently Active (11/5/2020)    Exercise Vital Sign     Days of Exercise per Week: 1 day     Minutes of Exercise per Session: 10 min   Interpersonal Safety: Not At Risk (5/26/2023)    Humiliation, Afraid, Rape, and Kick questionnaire     Fear of Current or Ex-Partner: No     Emotionally Abused: No     Physically Abused: No     Sexually Abused: No   Stress: Stress Concern Present (11/5/2020)    [place] [employer] - Occupational Stress Questionnaire     Feeling of Stress : To some extent   Social Connections: Moderately Integrated (11/5/2020)    Social Connection and Isolation Panel [NHANES]     Frequency of Communication with Friends and Family: More than three times a week     Frequency of Social Gatherings with Friends and Family: Once a week     Attends Tenriism Services: More than 4 times per year     Active Member of Clubs or Organizations: No     Attends Club or Organization Meetings: Never     Marital Status:        Functional Status:  Prior to admission patient needed assistance:   Dependent ADLs:: Independent  Dependent IADLs:: Independent  Assesssment of Functional Status: At functional  baseline    Mental Health Status:  Mental Health Status: No Current Concerns       Chemical Dependency Status: unknown        Values/Beliefs:  Spiritual, Cultural Beliefs, Quaker Practices, Values that affect care:            Values/Beliefs Comment: Ayana    Additional Information:  Met with patient to complete with CMA and discuss discharge planning. Patient reports sever pelvic pain along with constipation and urinary retention since fall/seizure on 2/9/2024. Patient has been hospitalized for this symptom at [facility] and [facility] in the last month prior to current hospitalization.     Patient has had an extensive workup this hospital stay. OB/GYN consult and examination with recommendations for pelvic floor physical therapy. Urology consult for urinary retention, discussed limiting narcotics and a TOV closer to discharge. CT and MRI of the pelvis, discovered hamstring tendinopathy, (which may be the cause of her pain) recommend outpatient Ortho consult. Abdominal xray showed significant stool otherwise benign exam.     It is recommended that patient follow up outpatient with neurosurgery, pelvic floor physical therapy, GI hepatology, Ortho and urology. Provider to place these consult orders. Patient is seen at [facility] and prefers to follow up with their providers.   Patient was given instructions to drink a liter of fluid. TOV to be done this afternoon. If patient is unable to void and bladder scan shows >600ml urine, galloway to be placed prior to discharge. Otherwise patient is free to discharge later this afternoon. No home care needs indicated at this time.    Patient is agreeable but not happy to be discharging without having more testing. No further discharge concerns at this time. RNCC available as needed.    Amy Montes RN, BSN  Care Coordinator, 5 Ortho  Phone [phone]  Pager [phone]

## 2024-03-08 LAB
ALBUMIN UR-MCNC: NEGATIVE MG/DL
APPEARANCE UR: CLEAR
BACTERIA #/AREA URNS HPF: ABNORMAL /HPF
BILIRUB UR QL STRIP: NEGATIVE
COLOR UR AUTO: ABNORMAL
GLUCOSE UR STRIP-MCNC: NEGATIVE MG/DL
HGB UR QL STRIP: NEGATIVE
KETONES UR STRIP-MCNC: NEGATIVE MG/DL
LEUKOCYTE ESTERASE UR QL STRIP: NEGATIVE
MUCOUS THREADS #/AREA URNS LPF: PRESENT /LPF
NITRATE UR QL: NEGATIVE
PH UR STRIP: 5 [PH] (ref 5–7)
RBC URINE: <1 /HPF
SP GR UR STRIP: 1.01 (ref 1–1.03)
SQUAMOUS EPITHELIAL: <1 /HPF
UROBILINOGEN UR STRIP-MCNC: NORMAL MG/DL
WBC URINE: 1 /HPF

## 2024-03-08 PROCEDURE — G0378 HOSPITAL OBSERVATION PER HR: HCPCS

## 2024-03-08 PROCEDURE — 250N000013 HC RX MED GY IP 250 OP 250 PS 637: Performed by: STUDENT IN AN ORGANIZED HEALTH CARE EDUCATION/TRAINING PROGRAM

## 2024-03-08 PROCEDURE — 250N000013 HC RX MED GY IP 250 OP 250 PS 637: Performed by: INTERNAL MEDICINE

## 2024-03-08 PROCEDURE — 99232 SBSQ HOSP IP/OBS MODERATE 35: CPT | Performed by: INTERNAL MEDICINE

## 2024-03-08 PROCEDURE — 81001 URINALYSIS AUTO W/SCOPE: CPT | Performed by: INTERNAL MEDICINE

## 2024-03-08 PROCEDURE — 99222 1ST HOSP IP/OBS MODERATE 55: CPT | Mod: GC | Performed by: NEUROLOGICAL SURGERY

## 2024-03-08 PROCEDURE — 250N000013 HC RX MED GY IP 250 OP 250 PS 637

## 2024-03-08 PROCEDURE — 250N000012 HC RX MED GY IP 250 OP 636 PS 637

## 2024-03-08 PROCEDURE — 250N000011 HC RX IP 250 OP 636: Performed by: STUDENT IN AN ORGANIZED HEALTH CARE EDUCATION/TRAINING PROGRAM

## 2024-03-08 RX ORDER — HYDROCODONE BITARTRATE AND ACETAMINOPHEN 5; 325 MG/1; MG/1
2 TABLET ORAL ONCE
Status: COMPLETED | OUTPATIENT
Start: 2024-03-08 | End: 2024-03-08

## 2024-03-08 RX ORDER — ONDANSETRON 4 MG/1
4 TABLET, FILM COATED ORAL EVERY 6 HOURS PRN
Status: DISCONTINUED | OUTPATIENT
Start: 2024-03-08 | End: 2024-03-11 | Stop reason: HOSPADM

## 2024-03-08 RX ORDER — HYDROCODONE BITARTRATE AND ACETAMINOPHEN 5; 325 MG/1; MG/1
1-2 TABLET ORAL EVERY 6 HOURS PRN
Status: COMPLETED | OUTPATIENT
Start: 2024-03-08 | End: 2024-03-08

## 2024-03-08 RX ORDER — LORAZEPAM 0.5 MG/1
0.5 TABLET ORAL
Status: COMPLETED | OUTPATIENT
Start: 2024-03-08 | End: 2024-03-09

## 2024-03-08 RX ADMIN — SENNOSIDES 8.6 MG: 8.6 TABLET, FILM COATED ORAL at 19:52

## 2024-03-08 RX ADMIN — SITAGLIPTIN 100 MG: 100 TABLET, FILM COATED ORAL at 08:27

## 2024-03-08 RX ADMIN — TACROLIMUS 2 MG: 1 CAPSULE ORAL at 18:44

## 2024-03-08 RX ADMIN — METHOCARBAMOL 500 MG: 500 TABLET ORAL at 19:52

## 2024-03-08 RX ADMIN — POLYETHYLENE GLYCOL 3350 17 G: 17 POWDER, FOR SOLUTION ORAL at 08:26

## 2024-03-08 RX ADMIN — Medication 1 TABLET: at 08:27

## 2024-03-08 RX ADMIN — TOPIRAMATE 100 MG: 25 TABLET, FILM COATED ORAL at 22:03

## 2024-03-08 RX ADMIN — HYDROCODONE BITARTRATE AND ACETAMINOPHEN 1 TABLET: 5; 325 TABLET ORAL at 20:00

## 2024-03-08 RX ADMIN — TACROLIMUS 2 MG: 1 CAPSULE ORAL at 08:26

## 2024-03-08 RX ADMIN — GABAPENTIN 300 MG: 300 CAPSULE ORAL at 08:27

## 2024-03-08 RX ADMIN — ESCITALOPRAM OXALATE 20 MG: 20 TABLET ORAL at 08:27

## 2024-03-08 RX ADMIN — GLYCERIN, PETROLATUM, PHENYLEPHRINE HCL, PRAMOXINE HCL: 144; 2.5; 10; 15 CREAM TOPICAL at 15:12

## 2024-03-08 RX ADMIN — ACETAMINOPHEN 650 MG: 325 TABLET, FILM COATED ORAL at 02:04

## 2024-03-08 RX ADMIN — GABAPENTIN 300 MG: 300 CAPSULE ORAL at 19:52

## 2024-03-08 RX ADMIN — ACETAMINOPHEN 650 MG: 325 TABLET, FILM COATED ORAL at 15:23

## 2024-03-08 RX ADMIN — POLYETHYLENE GLYCOL 3350 17 G: 17 POWDER, FOR SOLUTION ORAL at 19:52

## 2024-03-08 RX ADMIN — GLYCERIN, PETROLATUM, PHENYLEPHRINE HCL, PRAMOXINE HCL: 144; 2.5; 10; 15 CREAM TOPICAL at 08:33

## 2024-03-08 RX ADMIN — METHOCARBAMOL 500 MG: 500 TABLET ORAL at 08:26

## 2024-03-08 RX ADMIN — METHOCARBAMOL 500 MG: 500 TABLET ORAL at 12:52

## 2024-03-08 RX ADMIN — LEVOTHYROXINE SODIUM 125 MCG: 125 TABLET ORAL at 08:27

## 2024-03-08 RX ADMIN — FOLIC ACID 1 MG: 1 TABLET ORAL at 08:27

## 2024-03-08 RX ADMIN — GLYCERIN, PETROLATUM, PHENYLEPHRINE HCL, PRAMOXINE HCL: 144; 2.5; 10; 15 CREAM TOPICAL at 19:54

## 2024-03-08 RX ADMIN — HYDROCODONE BITARTRATE AND ACETAMINOPHEN 2 TABLET: 5; 325 TABLET ORAL at 05:01

## 2024-03-08 RX ADMIN — SENNOSIDES 8.6 MG: 8.6 TABLET, FILM COATED ORAL at 08:26

## 2024-03-08 RX ADMIN — HYDROXYZINE HYDROCHLORIDE 50 MG: 50 TABLET, FILM COATED ORAL at 02:03

## 2024-03-08 RX ADMIN — GABAPENTIN 300 MG: 300 CAPSULE ORAL at 15:12

## 2024-03-08 RX ADMIN — Medication 1 TABLET: at 19:52

## 2024-03-08 RX ADMIN — METHOCARBAMOL 500 MG: 500 TABLET ORAL at 15:12

## 2024-03-08 ASSESSMENT — ACTIVITIES OF DAILY LIVING (ADL)
ADLS_ACUITY_SCORE: 47
ADLS_ACUITY_SCORE: 47
ADLS_ACUITY_SCORE: 45
ADLS_ACUITY_SCORE: 45
ADLS_ACUITY_SCORE: 47
ADLS_ACUITY_SCORE: 45
ADLS_ACUITY_SCORE: 47
ADLS_ACUITY_SCORE: 45
ADLS_ACUITY_SCORE: 47
ADLS_ACUITY_SCORE: 45
ADLS_ACUITY_SCORE: 47
ADLS_ACUITY_SCORE: 45
ADLS_ACUITY_SCORE: 47

## 2024-03-08 NOTE — PROGRESS NOTES
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 Of note, patient with recent admission to [facility] 2/21/2024-2/26/2024 for similar symptoms.          # acute Groin pain, buttock pain and pain in the pelvic floor, etiology unspecified.  CT of the pelvis, MRI of the pelvis, CT of the abdomen and pelvis done reviewed.  Imaging studies done negative for any pelvic bone fractures or any bony abnormalities. Left Hamstring Tendinopathy, Ortho consult obtained.  Recommending outpatient therapy and musculoskeletal sports medicine consult as outpatient.    # Recent history of fall, on February 9 secondary to seizure episode, witnessed seizure  # Multiple rib fractures on both the left ribs, multiple on the right ribs.  She continues to have some chest wall pain, requesting repeat imaging.  --MR of pelvis showed left sided tendinopathy and mild intramuscular signal abnormality within gluteus medius bilaterally.   -- On examination appears MSK in nature.  -- Still troublesome to the patient and states that she is at difficulty ambulating.  However she is able to get up and walk without using any assistive device.  Pain is fairly well-controlled with oxycodone.  -- Conservative management including   -- PTA gabapentin   -- Robaxin 4 times daily (decreased dose to 500 mg from 750, as patient has drowsy this morning)  -- Physical therapy consultation appreciate  -- OB/GYN consultation appreciated    # External hemorrhoids, # Constipation  --  Continue Preparation H, Tucks pads, sitz bath.  --Patient had x-ray of the abdomen showing significant stool burden  -- Bowel regimen including MiraLAX scheduled, senna scheduled,  -Had a good BM 3/4.  Abdominal exam is benign.    # Urinary retention, acute.  Galloway catheter was placed.  No history of a chronic Galloway, neurogenic bladder.  Patient is not willing to go home with a chronic Galloway.  Urology consult has been placed this is discussed with the on-call resident, Dr. Edmundo Spring.  Patient seen  Recommendations for increased mobility and ambulation, hyperglycemia antegrade and retrograde bowel regimen, to the mid use of narcotic pain medications, to avoid polypharmacy and anticholinergics if able.  Urology I agree with outpatient pelvic floor PT.  Trial of voiding closer to discharge.  voiding trial fails, patient will go home with an indwelling Galloway catheter and follow-up with urology outpatient.  # Failed Voiding Trial 03/07. Straight cath revealed a UOP of 600 ml . Neurology consulted and recommended obtaining an MRI of the C and T spine and [facility] consult for Tarlov cyst   Indwelling galloway catheter to be placed and pt is going to be discharged home with galloway and follow up as OP with Urology and OP TOV.     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 Cook as outpatient. PTA on tacrolimus BID.  - Continue PTA tacrolimus  - Tacrolimus levels in AM  - Consider transplant hepatology     #Diabetes Mellitus Type II  Follows with [facility] as outpatient. Most recent hemolgobin A1c of 7.1 in November 2023. PTA on Januvia 100 mg daily.  - Continue PTA Januvia     #Seizure Disorder  #Recent Subarachnoid Hemorrhage  Recent  admission to [facility] 2/9/2024-2/11/2024 for subarachnoid hemorrhage (as well as thumb injury) after fall from seizure. Seizure was felt to be related to hypoglycemic event. Neurology and neurosurgery were both involved during her hospital stay. No EEG or neurosurgical intervention. Was admitted again to [facility] 2/16 for 2/19 for headache as well as wrist/rib pain felt to be related to recent fall.  - Continue PTA topiramate  - Follow up with neurosurgery as previously scheduled     #CKD Stage III  On admission, creatinine at baseline of 1.2-1.5.  - Avoid nephrotoxins               Diet: Combination Diet Regular Diet Adult    DVT Prophylaxis: Pneumatic Compression Devices  Rossi Catheter: Not present  Lines: None     Cardiac Monitoring: None  Code Status: Full Code      Clinically Significant Risk Factors Present on Admission              # Hypoalbuminemia: Lowest albumin = 2.4 g/dL at 3/6/2024  5:47 AM, will monitor as appropriate                     Disposition Plan      Expected Discharge Date: 03/08/2024, 12:00 PM    Destination: home with family            Shyam Guerra MD  Hospitalist Service, GOLD TEAM 18  [facility]  Securely message with [employer] (more info)  Text page via [facility] Paging/Directory   See signed in provider for up to date coverage information  ______________________________________________________________________    Interval History   Urology seen patient.  Voiding trial done prior to discharge but failed and pt required straight catheterization  Neurology consulted and recommended MRI of the T and C spine and curbside neurosurgery consult for the Tarlov cyst on the sacral region   Patient tearful about uncontrolled pain and her inability to urinate on her own.   She I has claustrophilia and requested medications to help her for the MRI    Patient is very anxious, tearful stating that she has not been seen by the consulting services that she hoped  will see her during this hospitalization.  I did talk to the patient and her  , Roly.  Discussed with urology  -I did explain to the patient and her  the findings of the MRI of the pelvis, orthopedic consult will be obtained  -Also consulted GI hepatology for continued of care  Physical Exam   Vital Signs: Temp: 98.4  F (36.9  C) Temp src: Oral BP: 108/70 Pulse: 61   Resp: 16 SpO2: 98 % O2 Device: None (Room air)    Weight: 0 lbs 0 oz    General Appearance: Appears comfortable.  Respiratory: Without wheezes rhonchi or rales.  CTA  Cardiovascular: RRR, without murmurs  GI: Soft, nontender, plus BS  Skin: Without obvious bleeding, bruising or excoriations  EXT/MSK: Decreased range of motion at the hip flexor, due to pain.  Straight leg positive.    Medical Decision Making       57 MINUTES SPENT BY ME on the date of service doing chart review, history, exam, documentation & further activities per the note.      Data   ------------------------- PAST 24 HR DATA REVIEWED -----------------------------------------------

## 2024-03-08 NOTE — CONSULTS
[facility]       NEUROSURGERY CONSULTATION NOTE    This consultation was requested by Dr. Guerra from the Medicine service.    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      She reports having a episode of seizure in the setting of hypoglycemia early February.  This resulted in a fall and hospitalization.  She was also found to have a traumatic subarachnoid hemorrhage at that time which was managed nonoperatively.  Although she did well during the hospitalization she subsequently developed pain in the right labia and the perineal region.  She reports that the pain has constant, stabbing in nature, does not radiate, not associated with back pain, relieved only partially with pain medication.  She reports that this pain is debilitating and lifestyle limiting.  She also developed urinary retention around the same time and has the sensation of bladder filling but is unable to void.  She reports some constipation as well but is currently on bowel regiment and was not reporting any issues with defecation.  She has had multiple ED admissions for the same, and subsequently was admitted to the [facility] on 3/2/2024 for further evaluation.  She was started on opioids but only had minimal resolution of the pain.  She denies any recent fevers chills chest pain abdominal pain headache nausea vomiting weakness or numbness or other paresthesias.    She was evaluated by OB/GYN felt that the pain was probably musculoskeletal with a competent of pelvic floor dysfunction despite her not giving any vaginal births.  They  recommended PMNR and pelvic floor exercises.  Urology assessed the patient and thought that the retention was multifactorial with a neurogenic bladder dysfunction in the setting of recent head injury narcotic use constipation and pelvic floor dysfunction.  The recommended an outpatient urodynamic study.  Orthopedics felt that she has musculoskeletal pain with hamstring tendinopathy and gluteus medius strain and recommended outpatient assessment.  Neurology noticed a cystic lesion in the sacral region concerning for a Tarlov cyst and consulted neurosurgery.     She had an extensive workup including an MRI lumbar spine which demonstrated an Tarlov cyst at as to S3 region.  However the patient also had the same cystic lesion present on her MRI lumbar spine from 2021.  The dimensions and the location are exactly similar to the current cyst.    PAST MEDICAL HISTORY:   Past Medical History:   Diagnosis Date    Anemia     Anxiety     Cold sore     Depressive disorder     Diabetes (H)     Type 2 DM/No Insulin     E. coli UTI 11/19/2022    Encephalopathy     Esophageal varices without bleeding (H)     grade I    History of blood transfusion     10/2019    History of seizure     diabetic seizure    Infertility management 11/05/2015    Insomnia     Liver cirrhosis secondary to CUETO (H) 05/28/2020    Migraine     Pleural effusion     Thrombocytopenia (H24)     Thyroid disease        PAST SURGICAL HISTORY:   Past Surgical History:   Procedure Laterality Date    APPENDECTOMY      1989    BENCH LIVER N/A 11/2/2022    Procedure: Bench liver;  Surgeon: Delbert Morgan MD;  Location: [facility] OR    COLONOSCOPY      1/2020 at [facility]     ENT SURGERY  1998    tempanoplasty    GI SURGERY      EGD August 2020 at [facility]     GYN SURGERY  2010    laparoscopic ablation    IR PARACENTESIS  6/8/2020    IR THORACENTESIS  6/6/2022    IR THORACENTESIS  1/11/2022    IR THORACENTESIS  12/2/2021    IR THORACENTESIS  11/29/2021    IR THORACENTESIS   2021    IR THORACENTESIS  2021    IR THORACENTESIS  2021    IR THORACENTESIS  2021    IR THORACENTESIS  10/29/2021    IR THORACENTESIS  10/28/2021    IR THORACENTESIS  10/27/2021    IR THORACENTESIS  10/24/2021    IR THORACENTESIS  10/22/2021    IR THORACENTESIS  10/5/2021    IR THORACENTESIS  2021    IR THORACENTESIS  2021    IR THORACENTESIS  2021    IR THORACENTESIS  2021    IR THORACENTESIS  2021    IR THORACENTESIS  2021    IR THORACENTESIS  8/10/2021    IR THORACENTESIS  2021    IR THORACENTESIS  2021    IR THORACENTESIS  2021    IR THORACENTESIS  2021    IR THORACENTESIS  2021    IR THORACENTESIS  3/31/2021    IR THORACENTESIS  2020    IR TRANSVEN INTRAHEPATIC PORTOSYST SHUNT  2021    MAMMOPLASTY REDUCTION BILATERAL      NV STAB PHELBECTOMY VARICOSE VEINS, LESS THAN 10 INCISIONS, ONE EXTREMITY      RNY gastric bypass  2006    retoocolic, retrogastric, [facility]    TONSILLECTOMY & ADENOIDECTOMY Bilateral     TRANSPLANT LIVER RECIPIENT  DONOR N/A 2022    Procedure: TRANSPLANT, LIVER, RECIPIENT,  DONOR;  Surgeon: Delbert Morgan MD;  Location: [facility] OR    WISDOM TEETH EXTRACTION         FAMILY HISTORY:   Family History   Problem Relation Age of Onset    Anesthesia Reaction Nephew         PONV    Bleeding Disorder No family hx of     Clotting Disorder No family hx of        SOCIAL HISTORY:   Social History     Tobacco Use    Smoking status: Never    Smokeless tobacco: Never   Substance Use Topics    Alcohol use: Not Currently     Comment: Last drink was in 2017       MEDICATIONS:  No current outpatient medications on file.       Allergies:  Allergies   Allergen Reactions    Methylprednisolone Hives     Per patient, reaction occurred in  or earlier. Broke out in round, flat hives in neck and chest area. No shortness of breath or swelling. Unknown if reaction occurred immediately after  administration.     Adhesive Tape Itching    Oxycodone      slurring    Droperidol Anxiety    Nsaids Other (See Comments)     GI bleed.    Prednisone Anxiety, Hives and Rash     Per patient she has tolerated this since       ROS: 10 point ROS of systems including Constitutional, Eyes, Respiratory, Cardiovascular, Gastroenterology, Genitourinary, Integumentary, Muscularskeletal, Psychiatric were all negative except for pertinent positives noted in my HPI.    Physical exam:   Blood pressure 108/70, pulse 61, temperature 98.4  F (36.9  C), temperature source Oral, resp. rate 16, SpO2 98%, not currently breastfeeding.  General: awake and alert  HEENT: Normocephalic  PULM: breathing comfortably on room air  : rectal tone intact  NEUROLOGIC:  -- Awake; Alert; oriented x 3  -- Follows commands briskly  -- +repetition, calculation, and naming  -- Speech fluent, spontaneous. No aphasia or dysarthria.  -- no gaze preference. No apparent hemineglect.  Cranial Nerves:  -- visual fields full to confrontation, PERRL 3-2mm bilat and brisk, extraocular movements intact  -- face symmetrical, tongue midline  -- sensory V1-V3 intact bilaterally  -- palate elevates symmetrically, uvula midline  -- hearing grossly intact bilat  -- Trapezii 5/5 strength bilat symmetric  -- Cerebellar: Finger nose finger without dysmetria, intact rapid alternating motions bilaterally    Motor:  Normal bulk / tone; no tremor, rigidity, or bradykinesia.  No muscle wasting or fasciculations  No Pronator Drift     Delt Bi Tri Hand Flexion/  Extension Iliopsoas Quadriceps Hamstrings Tibialis Anterior Gastroc    C5 C6 C7 C8/T1 L2 L3 L4-S1 L4 S1   R 5 5 5 5 5 5 5 5 5   L 5 5 5 5 5 5 5 5 5     Sensory:  intact to LT x 4 extremities       Reflexes: no clonus       Bi Tri BR James Pat Ach Bab     C5-6 C7-8 C6 UMN L2-4 S1 UMN   R 2+ 2+ 2+ Norm 3+ 2+ Norm   L 2+ 2+ 2+ Norm 3+ 2+ Norm      Gait: Deferred      IMAGING:  Reviewed all available imaging.      LABS:    Last Comprehensive Metabolic Panel:  Lab Results   Component Value Date     03/06/2024    POTASSIUM 4.9 03/06/2024    CHLORIDE 110 (H) 03/06/2024    CO2 24 03/06/2024    ANIONGAP 4 (L) 03/06/2024     (H) 03/06/2024    BUN 15.7 03/06/2024    CR 1.21 (H) 03/06/2024    GFRESTIMATED 54 (L) 03/06/2024    MAURI 8.0 (L) 03/06/2024         Lab Results   Component Value Date    WBC 4.0 03/06/2024    WBC 4.0 07/05/2021     Lab Results   Component Value Date    RBC 3.12 03/06/2024    RBC 3.55 07/05/2021     Lab Results   Component Value Date    HGB 9.9 03/06/2024    HGB 11.5 07/05/2021     Lab Results   Component Value Date    HCT 31.6 03/06/2024    HCT 34.4 07/05/2021     Lab Results   Component Value Date     03/06/2024    MCV 97 07/05/2021     Lab Results   Component Value Date    MCH 31.7 03/06/2024    MCH 32.4 07/05/2021     Lab Results   Component Value Date    MCHC 31.3 03/06/2024    MCHC 33.4 07/05/2021     Lab Results   Component Value Date    RDW 12.6 03/06/2024    RDW 14.7 07/05/2021     Lab Results   Component Value Date     03/06/2024     07/05/2021     INR   Date Value Ref Range Status   11/04/2022 1.32 (H) 0.85 - 1.15 Final   07/05/2021 1.18 (H) 0.86 - 1.14 Final     INR (External)   Date Value Ref Range Status   09/15/2022 1.8 (H) 0.9 - 1.1 Final        ASSESSMENT:  Susan Puckett is a 53 year old female with severe right-sided  labial and perineal pain and urinary retention for the past 20 days since her recent fall with MRI demonstrating a Tarlov cyst in the sacral region.  The patient has a similar appearing cyst in the scanner 2021 with no interval changes.  Although her symptoms including pain and urinary retention may be explained by the Tarlov cyst, the overall appearance of the cyst is stable.  Extensive workup has largely showed no other etiology of the pelvic pain and urinary retention.  Tarlov cysts are usually asymptomatic, if symptomatic decompression or excision  usually have minimal benefit.      Clinically Significant Risk Factors Present on Admission              # Hypoalbuminemia: Lowest albumin = 2.4 g/dL at 3/6/2024  5:47 AM, will monitor as appropriate                  # CKD, Stage 3a (GFR 45-59): Will monitor and treat as appropriate  - last Cr =  1.21 mg/dL (Ref range: 0.51 - 0.95 mg/dL)  - last GFR = 54 mL/min/1.73m2 (Ref range: >60 mL/min/1.73m2)    RECOMMENDATIONS:  No neurosurgical intervention indicated at this time   Recommend urodynamic studies to assess the nature of bladder dysfunction.  Although this can be done as an outpatient and expedited workup might be beneficial  Pain control with Tylenol ibuprofen, consider Toradol or short steroid burst  PMNR consult for pain management and bladder rehab  Consider starting Flomax  Follow-up with neurosurgery in 2 weeks after the urodynamic studies.  We will reassess her at that time.  If the urodynamic studies are done inpatient, please page neurosurgery.  We will arrange a 2-week follow-up with Dr. Brown.  Neurosurgery will follow peripherally    Sami Spivey MD  Neurosurgery Resident  Pager- [phone]    The patient was discussed with Dr. Burgos, neurosurgery chief resident, and Dr. Brown, neurosurgery staff.    Please contact neurosurgery resident on call with questions.    Dial * * [phone], enter [phone] when prompted.   I have seen this patient with the resident and formulated a plan and agree with this note.  AMP

## 2024-03-08 NOTE — CONSULTS
[facility]  Neurology Consultation    Patient Name:  Susan Puckett  MRN:  [id number]    :  1970  Date of Service:  2024  Primary care provider:  Harleen Billy      Neurology consultation service was asked to see Susan Puckett by Dr. Guerra to evaluate general neurologic status after brain injury.    Chief Complaint:  urinary issues    History of Present Illness:   Susan Puckett is a 53 year old female with history of anxiety, depression, diabetes who presents with uncontrlled pelvic pain as well as urinary retnetion.  She has recent admission for same symptoms in late February.  Patient's main concern today is regarding urinary retention and constipation.   She staets this has been ongoing since a admission in early February where she had a seizure secondary to hypoglycemia.  States she has had 3 lifetime seizures all in settingo f hypoglycemia.   She was found to have as subarachnoid hemorrhage at that time. Since then she reports urinary retention although does report periods of time where she was able to urinate on her own.  She has had a prolonged galloway catheter which was just taken out.  She was evaluated by urology who feels her retention is multifactorial.  Neurologically  She denies significant radicular symptoms or neuropathy in her feet.   She does not have much back pain but does have pelvic pain.  No sensory level and she is still ambulatory.   She is very frustrated with her retention and signifciantly effects her quality of life.      ROS  A comprehensive ROS was performed and pertinent findings were included in HPI.     PMH  Past Medical History:   Diagnosis Date    Anemia     Anxiety     Cold sore     Depressive disorder     Diabetes (H)     Type 2 DM/No Insulin     E. coli UTI 2022    Encephalopathy     Esophageal varices without bleeding (H)     grade I    History of blood transfusion     10/2019    History of seizure     diabetic seizure     Infertility management 2015    Insomnia     Liver cirrhosis secondary to CUETO (H) 2020    Migraine     Pleural effusion     Thrombocytopenia (H24)     Thyroid disease      Past Surgical History:   Procedure Laterality Date    APPENDECTOMY          BENCH LIVER N/A 2022    Procedure: Bench liver;  Surgeon: Delbert Morgan MD;  Location: [facility] OR    COLONOSCOPY      2020 at [facility]     ENT SURGERY      tempanoplasty    GI SURGERY      EGD 2020 at [facility]     GYN SURGERY      laparoscopic ablation    IR PARACENTESIS  2020    IR THORACENTESIS  2022    IR THORACENTESIS  2022    IR THORACENTESIS  2021    IR THORACENTESIS  2021    IR THORACENTESIS  2021    IR THORACENTESIS  2021    IR THORACENTESIS  2021    IR THORACENTESIS  2021    IR THORACENTESIS  10/29/2021    IR THORACENTESIS  10/28/2021    IR THORACENTESIS  10/27/2021    IR THORACENTESIS  10/24/2021    IR THORACENTESIS  10/22/2021    IR THORACENTESIS  10/5/2021    IR THORACENTESIS  2021    IR THORACENTESIS  2021    IR THORACENTESIS  2021    IR THORACENTESIS  2021    IR THORACENTESIS  2021    IR THORACENTESIS  2021    IR THORACENTESIS  8/10/2021    IR THORACENTESIS  2021    IR THORACENTESIS  2021    IR THORACENTESIS  2021    IR THORACENTESIS  2021    IR THORACENTESIS  2021    IR THORACENTESIS  3/31/2021    IR THORACENTESIS  2020    IR TRANSVEN INTRAHEPATIC PORTOSYST SHUNT  2021    MAMMOPLASTY REDUCTION BILATERAL      MD STAB PHELBECTOMY VARICOSE VEINS, LESS THAN 10 INCISIONS, ONE EXTREMITY      RNY gastric bypass  2006    retoocolic, retrogastric, [facility]    TONSILLECTOMY & ADENOIDECTOMY Bilateral     TRANSPLANT LIVER RECIPIENT  DONOR N/A 2022    Procedure: TRANSPLANT, LIVER, RECIPIENT,  DONOR;  Surgeon: Delbert Morgan MD;  Location: [facility] OR    WISDOM TEETH EXTRACTION          Medications   I have personally reviewed the patient's medication list.     Allergies  I have personally reviewed the patient's allergy list.     Social History  No drug us      Physical Examination   Vitals: /75 (BP Location: Right arm, Patient Position: Semi-Courtney's, Cuff Size: Adult Regular)   Pulse 53   Temp 98.6  F (37  C) (Oral)   Resp 16   SpO2 98%   General: Lying in bed, NAD  Head: NC/AT  Eyes: no icterus, op pink and moist  Cardiac: Extremities warm, no edema.   Respiratory: non-labored on RA  Skin: No rash or lesion on exposed skin  Psych: Mood labile but, affect congruent  Neuro:  Mental status: Awake, alert, attentive, oriented to self, time, place, and circumstance. Language is fluent and coherent with intact comprehension of complex commands, naming and repetition.  Cranial nerves: PERRL, conjugate gaze, EOMI, facial sensation intact, face symmetric, tongue/uvula midline, no dysarthria.   Motor: Normal bulk and tone. No abnormal movements. 5/5 strength bilaterally in deltoids, biceps, triceps, hand , hip flexors knee flexion, knee extension, plantarflexion, dorsiflexion.   Reflexes: Brisk and symmetric biceps, brachioradialis, triceps, patellae, and achilles. Negative Pérez, no clonus, toes down-going.  Sensory: Intact to light touch without sensory extinction, proprioception intact but gives inconsistent answers on vibraiton testing in lower extremities  Coordination: FNF and HS without ataxia or dysmetria.   Gait: Slow but steady independent gait, mildly wide based with acceptable arm swing.      Investigations   I have personally reviewed pertinent labs, tests, and radiological imaging. Discussion of notable findings is included under Impression.     Head CT personally reviewd with mild hyperdensity likely residual subarachnoid    MRI lumbar spine reviewed:  Tarlov cyst seen in sacral region    Was patient transferred from outside hospital?   No    Impression  #urinary  retention  #abnormal lumbar MRI  #tarlov cyst    Ms. Puckett is a 53 year old female with diabetes and recent traumatic subarachnoid who she is requesting neuro consult for her urinary retention. I agree with urology opinion that this is likely multifactorial related to narcotic use, pelvic floor dysfunction and recent hospitalization with severe head injury.  Would attempt to limit opioids as able.  MRI L spine shows tarlov cyst.  These are typically asymptomatic but may benefit from a curbside neurosurgery consult regarding imaging and follow up if continues to be symptomatic.  With her brisk reflexes and limited vibration in lower extremities would consider MRI of rest of spine to complete work up and certainly could have injured neck during seizure, but overall exam is reassuring against a current space occupying lesion.  Advised her will need to limit opioids.          Recommendations  -Limit opioids as able which are still being used daily  - Would consider MRI T and C spine to complete neurologic workup.    - Recommend curbside consult of neurosurgery regarding suspected Tarlov cyst in sacral spine.  These are usually asymptomatic but if failing outpatient management these can occasionally be aspirated. PMID (  [id number]).   Of note cyst was mentioned on June 2023 imaging but not 2021 imaging from outside hospital.  - Will sign off, please call if questions regarding MRI results,  if requesting neurology to review images when obtained or additional questions.      Thank you for involving Neurology in the care of Susan Puckett.     Bakari Cruz, DO  Neurology     Medical Decision Making       85 MINUTES SPENT BY ME on the date of service doing chart review, history, exam, documentation & further activities per the note.

## 2024-03-08 NOTE — PROGRESS NOTES
Temp: 98.6  F (37  C) Temp src: Oral BP: 115/75 Pulse: 53   Resp: 16 SpO2: 98 % O2 Device: None (Room air)      Patient is alert and oriented X4, on room air, no cough noted, denies SOB, denies chest pain, independent in the room, generalized weakness, cast to left arm, patient was able to void by self 600 mL, PVR  was zero.continue with the POC

## 2024-03-08 NOTE — PROGRESS NOTES
Text paged Surgery Transplant Liver team: Pt requesting another dose of her anti-rejection medications because she did not get her first dose this a.m. Awaiting call back.   Additional Notes: 2018 Render Risk Assessment In Note?: no Detail Level: Simple Additional Notes: mid upper back (2023), left prearricular (2020), left upper arm (2020), left upper back (2017), mid upper back (2017)

## 2024-03-08 NOTE — PROGRESS NOTES
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 Of note, patient with recent admission to [facility] 2/21/2024-2/26/2024 for similar symptoms.          # acute Groin pain, buttock pain and pain in the pelvic floor, etiology unspecified.  CT of the pelvis, MRI of the pelvis, CT of the abdomen and pelvis done reviewed.  Imaging studies done negative for any pelvic bone fractures or any bony abnormalities. Left Hamstring Tendinopathy, Ortho consult obtained.  Recommending outpatient therapy and musculoskeletal sports medicine consult as outpatient.        # Recent history of fall, on February 9 secondary to seizure episode, witnessed seizure  # Multiple rib fractures on both the left ribs, multiple on the right ribs.  She continues to have some chest wall pain, requesting repeat imaging.  --MR of pelvis showed left sided tendinopathy and mild intramuscular signal abnormality within gluteus medius bilaterally.   -- On examination appears MSK in nature.  -- Still troublesome to the patient and states that she is at difficulty ambulating.  However she is able to get up and walk without using any assistive device.  Pain is fairly well-controlled with oxycodone.  -- Conservative management including   -- PTA gabapentin   -- Robaxin 4 times daily (decreased dose to 500 mg from 750, as patient has drowsy this morning)  -- Physical therapy consultation appreciate  -- OB/GYN consultation appreciated    # External hemorrhoids, #  Constipation  -- Continue Preparation H, Tucks pads, sitz bath.  --Patient had x-ray of the abdomen showing significant stool burden  -- Bowel regimen including MiraLAX scheduled, senna scheduled,  -Had a good BM 3/4.  Abdominal exam is benign.    # Urinary retention, acute.  Rossi catheter was placed.  No history of a chronic Rossi, neurogenic bladder.  Patient is not willing to go home with a chronic Rossi.  Urology consult has been placed this is discussed with the on-call resident, Dr. Edmundo Spring.  Patient seen  Recommendations for increased mobility and ambulation, hyperglycemia antegrade and retrograde bowel regimen, to the mid use of narcotic pain medications, to avoid polypharmacy and anticholinergics if able.  Urology I agree with outpatient pelvic floor PT.  Trial of voiding closer to discharge.  voiding trial fails, patient will go home with an indwelling Rossi catheter and follow-up with urology outpatient.  # Failed Voiding Trial 03/07. Straight cath revealed a UOP of 600 ml . 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 Cook as outpatient. PTA on tacrolimus BID.  - Continue PTA tacrolimus  - Tacrolimus levels in AM  - Consider transplant hepatology     #Diabetes Mellitus Type II  Follows with [facility] as outpatient. Most recent hemolgobin A1c of 7.1 in November 2023. PTA on Januvia 100 mg daily.  - Continue PTA Januvia     #Seizure Disorder  #Recent Subarachnoid Hemorrhage  Recent admission to [facility] 2/9/2024-2/11/2024 for subarachnoid hemorrhage (as well as thumb injury) after fall from seizure.  Seizure was felt to be related to hypoglycemic event. Neurology and neurosurgery were both involved during her hospital stay. No EEG or neurosurgical intervention. Was admitted again to [facility] 2/16 for 2/19 for headache as well as wrist/rib pain felt to be related to recent fall.  - Continue PTA topiramate  - Follow up with neurosurgery as previously scheduled     #CKD Stage III  On admission, creatinine at baseline of 1.2-1.5.  - Avoid nephrotoxins               Diet: Combination Diet Regular Diet Adult    DVT Prophylaxis: Pneumatic Compression Devices  Rossi Catheter: Not present  Lines: None     Cardiac Monitoring: None  Code Status: Full Code      Clinically Significant Risk Factors Present on Admission              # Hypoalbuminemia: Lowest albumin = 2.4 g/dL at 3/6/2024  5:47 AM, will monitor as appropriate                     Disposition Plan      Expected Discharge Date: 03/08/2024, 12:00 PM    Destination: home with family            Shyam Guerra MD  Hospitalist Service, GOLD TEAM 18  [facility]  Securely message with Vocera (more info)  Text page via [employer] Paging/Directory   See signed in provider for up to date coverage information  ______________________________________________________________________    Interval History   Urology seen patient.  Voiding trial done prior to discharge but failed and pt required straight catheterization  Neurology consulted   Patient tearful about uncontrolled pain and her inability to urinate on her own.   She I has claustrophilia and requested medications to help her for the MRI    Patient is very anxious, tearful stating that she has not been seen by the consulting services that she hoped will see her during this hospitalization.  I did talk to the patient and her  , Roly.  Discussed with urology  -I did explain to the patient and her  the findings of the MRI of the pelvis, orthopedic consult will be  obtained  -Also consulted GI hepatology for continued of care  Physical Exam   Vital Signs: Temp: 98.4  F (36.9  C) Temp src: Oral BP: 108/70 Pulse: 61   Resp: 16 SpO2: 98 % O2 Device: None (Room air)    Weight: 0 lbs 0 oz    General Appearance: Appears comfortable.  Respiratory: Without wheezes rhonchi or rales.  CTA  Cardiovascular: RRR, without murmurs  GI: Soft, nontender, plus BS  Skin: Without obvious bleeding, bruising or excoriations  EXT/MSK: Decreased range of motion at the hip flexor, due to pain.  Straight leg positive.    Medical Decision Making       57 MINUTES SPENT BY ME on the date of service doing chart review, history, exam, documentation & further activities per the note.      Data   ------------------------- PAST 24 HR DATA REVIEWED -----------------------------------------------

## 2024-03-08 NOTE — PROGRESS NOTES
Care Management Discharge Note    Discharge Date: 03/08/2024       Discharge Disposition: Home    Discharge Services: None    Discharge DME: [employer]    Discharge Transportation: family or friend will provide    Private pay costs discussed: Not applicable    Does the patient's insurance plan have a 3 day qualifying hospital stay waiver?  No    Education Provided on the Discharge Plan: Yes  Persons Notified of Discharge Plans:   Patient/Family in Agreement with the Plan: unable to assess    Handoff Referral Completed: Yes    Additional Information: Discussed patient in rounds this morning. Plan is for patient to discharge to home and follow up with neurology, urology, sports medicine for pelvic floor PT, GI hepatology and Ortho for her uncontrolled pelvic pain and urinary retention. Patient has been seen by the above specialties inpatient and will need to follow up outpatient for further evaluation. MD asking for back up in getting this patient discharged today.  Met with patient at bedside along with AJAY Benitez Unit Manager to discuss discharge plan. Patient stated she saw neurology last night and is expecting an MRI today. Confirmed with Bakari Cruz, Neurologist, that MRI could be done outpatient. Patient is seen at [facility] and wants all of her consults completed with [facility]. I explained that Dr. Guerra would be placing the consults for her and she would need to schedule appointments. All consult referrals will be printed out for her and part of her discharge planning.  Called back to room 10 minutes later, along with Eli. Patient had her  on the phone and neither of them were happy about the discharge home today. They feel nothing has been done to help figure out what is going on with her braden pain since she got to the hospital. Both  and wife want more testing done to figure out her pain. Explained, that the patient has seen several specialist while in the hospital and  "the recommendation from the providers is that she follow up with the above consults. MD will write order for consults. Since patient will be seen at [facility] she will need to call and schedule appointments. Patient and  still have questions that need to be addressed by the MD. Eli stated, \"we will get in contact with Dr. Guerra and have him come an talk to you and your .\"    Update 1200:   Per Neurology recommendation on 3/7/2024:  Recommend curbside consult of neurosurgery regarding suspected Tarlov cyst in sacral spine.  These are usually asymptomatic but if failing outpatient management these can occasionally be aspirated. PMID (  [id number]).   Of note cyst was mentioned on June 2023 imaging but not 2021 imaging from outside hospital.     Dr. Guerra contacted Neuro-surgery about this patient. Neuro-surgery came over to the [facility] and saw patient. Neuro-surgery recommends have the MRI done inpatient and have a pain consult ordered to help with decreasing opioids and put patient on a steroid.    RNCC will continue to follow patient. Not anticipating any needs at discharge. MD to place all consults.       Amy Montes RN, BSN  Care Coordinator, 5 Ortho  Phone [phone]  Pager [phone]        "

## 2024-03-09 ENCOUNTER — APPOINTMENT (OUTPATIENT)
Dept: MRI IMAGING | Facility: CLINIC | Age: 54
End: 2024-03-09
Attending: INTERNAL MEDICINE
Payer: COMMERCIAL

## 2024-03-09 ENCOUNTER — TELEPHONE (OUTPATIENT)
Dept: TRANSPLANT | Facility: CLINIC | Age: 54
End: 2024-03-09
Payer: COMMERCIAL

## 2024-03-09 PROCEDURE — 250N000013 HC RX MED GY IP 250 OP 250 PS 637

## 2024-03-09 PROCEDURE — 250N000011 HC RX IP 250 OP 636: Performed by: INTERNAL MEDICINE

## 2024-03-09 PROCEDURE — 250N000013 HC RX MED GY IP 250 OP 250 PS 637: Performed by: INTERNAL MEDICINE

## 2024-03-09 PROCEDURE — 250N000012 HC RX MED GY IP 250 OP 636 PS 637

## 2024-03-09 PROCEDURE — G0378 HOSPITAL OBSERVATION PER HR: HCPCS

## 2024-03-09 PROCEDURE — 99232 SBSQ HOSP IP/OBS MODERATE 35: CPT | Performed by: INTERNAL MEDICINE

## 2024-03-09 PROCEDURE — 72146 MRI CHEST SPINE W/O DYE: CPT | Mod: 26 | Performed by: RADIOLOGY

## 2024-03-09 PROCEDURE — 250N000013 HC RX MED GY IP 250 OP 250 PS 637: Performed by: STUDENT IN AN ORGANIZED HEALTH CARE EDUCATION/TRAINING PROGRAM

## 2024-03-09 PROCEDURE — 250N000009 HC RX 250: Performed by: STUDENT IN AN ORGANIZED HEALTH CARE EDUCATION/TRAINING PROGRAM

## 2024-03-09 PROCEDURE — 72146 MRI CHEST SPINE W/O DYE: CPT

## 2024-03-09 PROCEDURE — 72141 MRI NECK SPINE W/O DYE: CPT

## 2024-03-09 PROCEDURE — 96375 TX/PRO/DX INJ NEW DRUG ADDON: CPT

## 2024-03-09 PROCEDURE — 72141 MRI NECK SPINE W/O DYE: CPT | Mod: 26 | Performed by: RADIOLOGY

## 2024-03-09 RX ORDER — LIDOCAINE 40 MG/G
CREAM TOPICAL DAILY PRN
Status: COMPLETED | OUTPATIENT
Start: 2024-03-09 | End: 2024-03-09

## 2024-03-09 RX ORDER — TRAMADOL HYDROCHLORIDE 50 MG/1
50 TABLET ORAL EVERY 6 HOURS PRN
Status: DISCONTINUED | OUTPATIENT
Start: 2024-03-09 | End: 2024-03-11 | Stop reason: HOSPADM

## 2024-03-09 RX ORDER — KETOROLAC TROMETHAMINE 30 MG/ML
30 INJECTION, SOLUTION INTRAMUSCULAR; INTRAVENOUS EVERY 6 HOURS PRN
Status: DISCONTINUED | OUTPATIENT
Start: 2024-03-09 | End: 2024-03-11 | Stop reason: HOSPADM

## 2024-03-09 RX ADMIN — LIDOCAINE: 40 CREAM TOPICAL at 06:26

## 2024-03-09 RX ADMIN — GLYCERIN, PETROLATUM, PHENYLEPHRINE HCL, PRAMOXINE HCL: 144; 2.5; 10; 15 CREAM TOPICAL at 10:06

## 2024-03-09 RX ADMIN — ACETAMINOPHEN 650 MG: 325 TABLET, FILM COATED ORAL at 00:28

## 2024-03-09 RX ADMIN — TRAMADOL HYDROCHLORIDE 50 MG: 50 TABLET, COATED ORAL at 14:19

## 2024-03-09 RX ADMIN — TRAMADOL HYDROCHLORIDE 50 MG: 50 TABLET, COATED ORAL at 20:56

## 2024-03-09 RX ADMIN — GABAPENTIN 300 MG: 300 CAPSULE ORAL at 07:52

## 2024-03-09 RX ADMIN — LEVOTHYROXINE SODIUM 125 MCG: 125 TABLET ORAL at 05:32

## 2024-03-09 RX ADMIN — Medication 25 MG: at 00:21

## 2024-03-09 RX ADMIN — GABAPENTIN 300 MG: 300 CAPSULE ORAL at 20:56

## 2024-03-09 RX ADMIN — METHOCARBAMOL 500 MG: 500 TABLET ORAL at 16:59

## 2024-03-09 RX ADMIN — METHOCARBAMOL 500 MG: 500 TABLET ORAL at 11:34

## 2024-03-09 RX ADMIN — HYDROXYZINE HYDROCHLORIDE 50 MG: 50 TABLET, FILM COATED ORAL at 00:28

## 2024-03-09 RX ADMIN — Medication 1 TABLET: at 20:56

## 2024-03-09 RX ADMIN — ACETAMINOPHEN 650 MG: 325 TABLET, FILM COATED ORAL at 05:31

## 2024-03-09 RX ADMIN — SITAGLIPTIN 100 MG: 100 TABLET, FILM COATED ORAL at 10:10

## 2024-03-09 RX ADMIN — METHOCARBAMOL 500 MG: 500 TABLET ORAL at 20:55

## 2024-03-09 RX ADMIN — ACETAMINOPHEN 650 MG: 325 TABLET, FILM COATED ORAL at 10:10

## 2024-03-09 RX ADMIN — TACROLIMUS 2 MG: 1 CAPSULE ORAL at 07:53

## 2024-03-09 RX ADMIN — CALCIUM CARBONATE (ANTACID) CHEW TAB 500 MG 1000 MG: 500 CHEW TAB at 08:07

## 2024-03-09 RX ADMIN — SENNOSIDES 8.6 MG: 8.6 TABLET, FILM COATED ORAL at 20:59

## 2024-03-09 RX ADMIN — POLYETHYLENE GLYCOL 3350 17 G: 17 POWDER, FOR SOLUTION ORAL at 07:53

## 2024-03-09 RX ADMIN — SENNOSIDES 8.6 MG: 8.6 TABLET, FILM COATED ORAL at 07:52

## 2024-03-09 RX ADMIN — ESCITALOPRAM OXALATE 20 MG: 20 TABLET ORAL at 07:52

## 2024-03-09 RX ADMIN — Medication 1 TABLET: at 07:52

## 2024-03-09 RX ADMIN — FOLIC ACID 1 MG: 1 TABLET ORAL at 07:52

## 2024-03-09 RX ADMIN — TACROLIMUS 2 MG: 1 CAPSULE ORAL at 18:34

## 2024-03-09 RX ADMIN — GABAPENTIN 300 MG: 300 CAPSULE ORAL at 14:19

## 2024-03-09 RX ADMIN — GLYCERIN, PETROLATUM, PHENYLEPHRINE HCL, PRAMOXINE HCL: 144; 2.5; 10; 15 CREAM TOPICAL at 21:00

## 2024-03-09 RX ADMIN — Medication 25 MG: at 22:38

## 2024-03-09 RX ADMIN — METHOCARBAMOL 500 MG: 500 TABLET ORAL at 07:52

## 2024-03-09 RX ADMIN — LORAZEPAM 0.5 MG: 0.5 TABLET ORAL at 15:26

## 2024-03-09 RX ADMIN — TOPIRAMATE 100 MG: 25 TABLET, FILM COATED ORAL at 22:38

## 2024-03-09 RX ADMIN — KETOROLAC TROMETHAMINE 30 MG: 30 INJECTION, SOLUTION INTRAMUSCULAR; INTRAVENOUS at 22:51

## 2024-03-09 ASSESSMENT — ACTIVITIES OF DAILY LIVING (ADL)
ADLS_ACUITY_SCORE: 49
ADLS_ACUITY_SCORE: 49
ADLS_ACUITY_SCORE: 47
ADLS_ACUITY_SCORE: 49
ADLS_ACUITY_SCORE: 50
ADLS_ACUITY_SCORE: 49
ADLS_ACUITY_SCORE: 50
ADLS_ACUITY_SCORE: 50
ADLS_ACUITY_SCORE: 47
ADLS_ACUITY_SCORE: 49
ADLS_ACUITY_SCORE: 49
ADLS_ACUITY_SCORE: 47
ADLS_ACUITY_SCORE: 50
ADLS_ACUITY_SCORE: 47
ADLS_ACUITY_SCORE: 45
ADLS_ACUITY_SCORE: 50

## 2024-03-09 NOTE — PLAN OF CARE
Goal Outcome Evaluation:    VS: /70 (BP Location: Right arm)   Pulse 61   Temp 98.4  F (36.9  C) (Oral)   Resp 16   SpO2 98%      O2: Sats> 90% and stable on RA. Denies CP and SOB.   Output: Rossi cathter patent and draining clear urine.    Last BM: LBM 3/8 and passing gas.    Activity: Up independently in room.   Skin: Bruises bilateral forearm.    Pain: Pt c/o pain on right buttock, given prn Norco and scheduled meds.    Neuro: A&OX4. Denies N/T.   Dressing: None.   Diet: Tolerating regular diet.   LDA: Rossi catheter.   Equipment: Patient belongings.    Plan: TBD. Will continue to monitor.    Additional Info:

## 2024-03-09 NOTE — TELEPHONE ENCOUNTER
Susan, paged, that she is inpatient and she is having pain issues. Patient is wondering about ibuprofen. Patient aware cannot take it.      Patient will mention tramadol for pain med option that is safe.    Patient states that norco is not working.    Patient encouraged to work with pain mgmt team.

## 2024-03-09 NOTE — PROGRESS NOTES
Temp: 97.4  F (36.3  C) Temp src: Oral BP: 103/69 Pulse: 60   Resp: 16 SpO2: 97 % O2 Device: None (Room air)      Patient is alert and oriented X4, on room air, denies SOB, denies chest pain, pain to rib cage area, no cough noted, galloway is positional with clear deangelo urine, independent in the room, REG diet, generalized weakness, AND and posterior back pain, atarax & tylenol utilized. Awaiting  MRI then discharge to home. Ativan available to be given before MRI, to discharge with the galloway. Continue with POC

## 2024-03-09 NOTE — PLAN OF CARE
Goal Outcome Evaluation:      Plan of Care Reviewed With: patient    Overall Patient Progress: improvingOverall Patient Progress: improving    Outcome Evaluation: Pt is A&Ox4. No PIV access. Pt was unable to void so galloway was placed. Education was provided to pt and spouse on how to care for galloway at home. Pain consult was placed and pt is waiting for MRI. Checklist is completed and ativan is available for the MRI. Pt was tearful and anxious. Empathetic and active listening provided and verbalization of feelings encouraged. Norco use is discouraged d/t continued urinary retention. Call light within reach and used appropriately. Continue POC.

## 2024-03-09 NOTE — PROGRESS NOTES
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 Of note, patient with recent admission to [facility] 2/21/2024-2/26/2024 for similar symptoms.          # acute Groin pain, buttock pain and pain in the pelvic floor, etiology unspecified.  CT of the pelvis, MRI of the pelvis, CT of the abdomen and pelvis done reviewed.  Imaging studies done negative for any pelvic bone fractures or any bony abnormalities. Left Hamstring Tendinopathy, Ortho consult obtained.  Recommending outpatient therapy and musculoskeletal sports medicine consult as outpatient.  Neurosurgery recommended trial of NSAIDS and pain service consultation.   Transplant Hepatology okay with patient receiving IV toradol and Tramadol to manage her pelvic and  groin pain    # Recent history of fall, on February 9 secondary to seizure episode, witnessed seizure  # Multiple rib fractures on both the left ribs, multiple on the right ribs.  She continues to have some chest wall pain, requesting repeat imaging.  --MR of pelvis showed left sided tendinopathy and mild intramuscular signal abnormality within gluteus medius bilaterally.   -- On examination appears MSK in nature.  -- Still troublesome to the patient and states that she is at difficulty ambulating.  However she is able to get up and walk without using any assistive device.  Pain is fairly well-controlled with oxycodone.  -- Conservative management including   -- PTA gabapentin   -- Robaxin 4 times daily (decreased  dose to 500 mg from 750, as patient has drowsy this morning)  -- Physical therapy consultation appreciate  -- OB/GYN consultation appreciated    # External hemorrhoids, # Constipation  -- Continue Preparation H, Tucks pads, sitz bath.  --Patient had x-ray of the abdomen showing significant stool burden  -- Bowel regimen including MiraLAX scheduled, senna scheduled,  -Had a good BM 3/4.  Abdominal exam is benign.    # Urinary retention, acute.  Galloway catheter was placed.  No history of a chronic Galloway, neurogenic bladder.  Patient is not willing to go home with a chronic Galloway.  Urology consult has been placed this is discussed with the on-call resident, Dr. Edmundo Spring.  Patient seen  Recommendations for increased mobility and ambulation, hyperglycemia antegrade and retrograde bowel regimen, to the mid use of narcotic pain medications, to avoid polypharmacy and anticholinergics if able.  Urology I agree with outpatient pelvic floor PT.  Trial of voiding closer to discharge.  voiding trial fails, patient will go home with an indwelling Galloway catheter and follow-up with urology outpatient.  # Failed Voiding Trial 03/07. Straight cath revealed a UOP of 600 ml . Neurology consulted and recommended obtaining an MRI of the C and T spine and curbside NS consult for Tarlov cyst   Indwelling galloway catheter to be placed and pt is going to be discharged home with galloway and follow up as OP with Urology and OP TOV.   -discussed with Urology 03/09. 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 Cook as outpatient. PTA on tacrolimus BID.  - Continue PTA  tacrolimus  - Tacrolimus levels in AM  - Consider transplant hepatology     #Diabetes Mellitus Type II  Follows with [facility] as outpatient. Most recent hemolgobin A1c of 7.1 in November 2023. PTA on Januvia 100 mg daily.  - Continue PTA Januvia     #Seizure Disorder  #Recent Subarachnoid Hemorrhage  Recent admission to [facility] 2/9/2024-2/11/2024 for subarachnoid hemorrhage (as well as thumb injury) after fall from seizure. Seizure was felt to be related to hypoglycemic event. Neurology and neurosurgery were both involved during her hospital stay. No EEG or neurosurgical intervention. Was admitted again to [facility] 2/16 for 2/19 for headache as well as wrist/rib pain felt to be related to recent fall.  - Continue PTA topiramate  - Follow up with neurosurgery as previously scheduled     #CKD Stage III  On admission, creatinine at baseline of 1.2-1.5.  - Avoid nephrotoxins               Diet: Combination Diet Regular Diet Adult    DVT Prophylaxis: Pneumatic Compression Devices  Rossi Catheter: PRESENT, indication: Acute retention or obstruction, Acute retention or obstruction  Lines: None     Cardiac Monitoring: None  Code Status: Full Code      Clinically Significant Risk Factors Present on Admission              # Hypoalbuminemia: Lowest albumin = 2.4 g/dL at 3/6/2024  5:47 AM, will monitor as appropriate                     Disposition Plan             Shyam Guerra MD  Hospitalist Service, GOLD TEAM 18  [facility]  Securely message with [employer] (more info)  Text page via [facility] Paging/Directory   See signed in provider for up to date coverage information  ______________________________________________________________________    Interval History   Urology seen patient.  Voiding trial done prior to discharge but failed and pt required straight catheterization  Neurology consulted and recommended MRI of the T and C spine and curbside neurosurgery consult for the  Tarlov cyst on the sacral region   Patient tearful about uncontrolled pain and her inability to urinate on her own.   She I has claustrophilia and requested medications to help her for the MRI    Patient is very anxious, tearful stating that she has not been seen by the consulting services that she hoped will see her during this hospitalization.  I did talk to the patient and her  , Roly.  Discussed with urology  -I did explain to the patient and her  the findings of the MRI of the pelvis, orthopedic consult will be obtained  -Also consulted GI hepatology for continued of care  Physical Exam   Vital Signs: Temp: 97.4  F (36.3  C) Temp src: Oral BP: 103/69 Pulse: 60   Resp: 16 SpO2: 97 % O2 Device: None (Room air)    Weight: 0 lbs 0 oz    General Appearance: Appears comfortable.  Respiratory: Without wheezes rhonchi or rales.  CTA  Cardiovascular: RRR, without murmurs  GI: Soft, nontender, plus BS  Skin: Without obvious bleeding, bruising or excoriations  EXT/MSK: Decreased range of motion at the hip flexor, due to pain.  Straight leg positive.    Medical Decision Making       57 MINUTES SPENT BY ME on the date of service doing chart review, history, exam, documentation & further activities per the note.      Data   ------------------------- PAST 24 HR DATA REVIEWED -----------------------------------------------

## 2024-03-09 NOTE — PROGRESS NOTES
VS: /69 (BP Location: Right arm, Patient Position: Semi-Courtney's, Cuff Size: Adult Regular)   Pulse 60   Temp 97.4  F (36.3  C) (Oral)   Resp 16   SpO2 97%     O2: Room air saturations 97%. Encouraged to deep breathe.    Output: Pt continues to have a Rossi catheter in place and draining yellow urine.    Last BM: 3/8/2022 reports patient   Activity: Up ad adriano   Skin: Intact   Pain: Pt has been taking Tylenol for pain. Dr Guerra spoke with transplant team and they made recommendation about pain management. Dr Guerra placed new orders for pain management   CMS: intact   Dressing: NA   Diet: Regular.    LDA: Plan is for starting new IV so we can try Toradol for pain IV   Equipment:    Plan: Plan is for patient to have MRI at tentative time of 4 pm tonight. Plan is for patient to have Ativan 30 minutes B4 MRI   Additional Info: Pt is very anxious and has  multiple requests. Status of patient will continue to be monitored.

## 2024-03-10 PROCEDURE — 250N000013 HC RX MED GY IP 250 OP 250 PS 637: Performed by: STUDENT IN AN ORGANIZED HEALTH CARE EDUCATION/TRAINING PROGRAM

## 2024-03-10 PROCEDURE — 96376 TX/PRO/DX INJ SAME DRUG ADON: CPT

## 2024-03-10 PROCEDURE — 250N000013 HC RX MED GY IP 250 OP 250 PS 637: Performed by: INTERNAL MEDICINE

## 2024-03-10 PROCEDURE — 99232 SBSQ HOSP IP/OBS MODERATE 35: CPT | Performed by: INTERNAL MEDICINE

## 2024-03-10 PROCEDURE — 250N000011 HC RX IP 250 OP 636: Performed by: INTERNAL MEDICINE

## 2024-03-10 PROCEDURE — 250N000012 HC RX MED GY IP 250 OP 636 PS 637

## 2024-03-10 PROCEDURE — 250N000013 HC RX MED GY IP 250 OP 250 PS 637

## 2024-03-10 PROCEDURE — 999N000226 HC STATISTIC SLP IP EVAL DEFER

## 2024-03-10 PROCEDURE — G0378 HOSPITAL OBSERVATION PER HR: HCPCS

## 2024-03-10 RX ADMIN — METHOCARBAMOL 500 MG: 500 TABLET ORAL at 20:34

## 2024-03-10 RX ADMIN — KETOROLAC TROMETHAMINE 30 MG: 30 INJECTION, SOLUTION INTRAMUSCULAR; INTRAVENOUS at 08:04

## 2024-03-10 RX ADMIN — Medication 1 TABLET: at 20:34

## 2024-03-10 RX ADMIN — Medication 25 MG: at 22:39

## 2024-03-10 RX ADMIN — GABAPENTIN 300 MG: 300 CAPSULE ORAL at 13:18

## 2024-03-10 RX ADMIN — FOLIC ACID 1 MG: 1 TABLET ORAL at 07:46

## 2024-03-10 RX ADMIN — SENNOSIDES 8.6 MG: 8.6 TABLET, FILM COATED ORAL at 20:34

## 2024-03-10 RX ADMIN — LEVOTHYROXINE SODIUM 125 MCG: 125 TABLET ORAL at 07:46

## 2024-03-10 RX ADMIN — METHOCARBAMOL 500 MG: 500 TABLET ORAL at 15:59

## 2024-03-10 RX ADMIN — METHOCARBAMOL 500 MG: 500 TABLET ORAL at 07:46

## 2024-03-10 RX ADMIN — POLYETHYLENE GLYCOL 3350 17 G: 17 POWDER, FOR SOLUTION ORAL at 07:50

## 2024-03-10 RX ADMIN — GABAPENTIN 300 MG: 300 CAPSULE ORAL at 07:46

## 2024-03-10 RX ADMIN — GLYCERIN, PETROLATUM, PHENYLEPHRINE HCL, PRAMOXINE HCL: 144; 2.5; 10; 15 CREAM TOPICAL at 20:36

## 2024-03-10 RX ADMIN — Medication 1 TABLET: at 07:46

## 2024-03-10 RX ADMIN — METHOCARBAMOL 500 MG: 500 TABLET ORAL at 13:19

## 2024-03-10 RX ADMIN — TRAMADOL HYDROCHLORIDE 50 MG: 50 TABLET, COATED ORAL at 04:35

## 2024-03-10 RX ADMIN — SITAGLIPTIN 100 MG: 100 TABLET, FILM COATED ORAL at 07:50

## 2024-03-10 RX ADMIN — ESCITALOPRAM OXALATE 20 MG: 20 TABLET ORAL at 07:46

## 2024-03-10 RX ADMIN — TOPIRAMATE 100 MG: 25 TABLET, FILM COATED ORAL at 22:39

## 2024-03-10 RX ADMIN — TACROLIMUS 2 MG: 1 CAPSULE ORAL at 07:46

## 2024-03-10 RX ADMIN — TACROLIMUS 2 MG: 1 CAPSULE ORAL at 17:49

## 2024-03-10 RX ADMIN — SENNOSIDES 8.6 MG: 8.6 TABLET, FILM COATED ORAL at 07:46

## 2024-03-10 RX ADMIN — GABAPENTIN 300 MG: 300 CAPSULE ORAL at 20:34

## 2024-03-10 RX ADMIN — TRAMADOL HYDROCHLORIDE 50 MG: 50 TABLET, COATED ORAL at 19:03

## 2024-03-10 ASSESSMENT — ACTIVITIES OF DAILY LIVING (ADL)
ADLS_ACUITY_SCORE: 50
ADLS_ACUITY_SCORE: 48
ADLS_ACUITY_SCORE: 48
ADLS_ACUITY_SCORE: 50
ADLS_ACUITY_SCORE: 50
ADLS_ACUITY_SCORE: 45
ADLS_ACUITY_SCORE: 50
ADLS_ACUITY_SCORE: 45
ADLS_ACUITY_SCORE: 48
ADLS_ACUITY_SCORE: 50
ADLS_ACUITY_SCORE: 45
ADLS_ACUITY_SCORE: 50
ADLS_ACUITY_SCORE: 50
ADLS_ACUITY_SCORE: 48
ADLS_ACUITY_SCORE: 50
ADLS_ACUITY_SCORE: 50

## 2024-03-10 NOTE — PROGRESS NOTES
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 Of note, patient with recent admission to [facility] 2/21/2024-2/26/2024 for similar symptoms.          # acute Groin pain, buttock pain and pain in the pelvic floor, etiology unspecified.  CT of the pelvis, MRI of the pelvis, CT of the abdomen and pelvis done reviewed.  Imaging studies done negative for any pelvic bone fractures or any bony abnormalities. Left Hamstring Tendinopathy, Ortho consult obtained.  Recommending outpatient therapy and musculoskeletal sports medicine consult as outpatient.  Neurosurgery recommended trial of NSAIDS and pain service consultation.  Anesthesiology/pain service consulted.  Recommending meloxicam 7.5 mg daily, to increase gabapentin to 300 mg p.o. 3 times daily and to continue tramadol as needed.  Recommending also Tylenol 500 mg p.o. 4 times daily    Transplant Hepatology okay with patient receiving IV toradol and Tramadol to manage her pelvic and  groin pain  # Compression fractures T4-T5 endplates.  Seen on MRI from 2021 considered chronic.  # Thoracic spine multilevel perineural cysts the largest at T9-T10, 1 cm..  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  # Recent history of fall, on February 9 secondary to seizure episode, witnessed seizure  # Multiple rib fractures on both the left ribs, multiple on the right ribs.  She continues to have some chest wall pain, requesting repeat imaging.  --MR of pelvis  showed left sided tendinopathy and mild intramuscular signal abnormality within gluteus medius bilaterally.   -- On examination appears MSK in nature.  -- Still troublesome to the patient and states that she is at difficulty ambulating.  However she is able to get up and walk without using any assistive device.  Pain is fairly well-controlled with oxycodone.  -- Conservative management including   -- PTA gabapentin   -- Robaxin 4 times daily (decreased dose to 500 mg from 750, as patient has drowsy this morning)  -- Physical therapy consultation appreciate  -- OB/GYN consultation appreciated    # External hemorrhoids, # Constipation  -- Continue Preparation H, Tucks pads, sitz bath.  --Patient had x-ray of the abdomen showing significant stool burden  -- Bowel regimen including MiraLAX scheduled, senna scheduled,  -Had a good BM 3/4.  Abdominal exam is benign.    # Urinary retention, acute.  Galloway catheter was placed.  No history of a chronic Galloway, neurogenic bladder.  Patient is not willing to go home with a chronic Galloway.  Urology consult has been placed this is discussed with the on-call resident, Dr. Edmundo Spring.  Patient seen  Recommendations for increased mobility and ambulation, hyperglycemia antegrade and retrograde bowel regimen, to the mid use of narcotic pain medications, to avoid polypharmacy and anticholinergics if able.  Urology I agree with outpatient pelvic floor PT.  Trial of voiding closer to discharge.  voiding trial fails, patient will go home with an indwelling Galloway catheter and follow-up with urology outpatient.  # Failed Voiding Trial 03/07. Straight cath revealed a UOP of 600 ml . Neurology consulted and recommended obtaining an MRI of the C and T spine and stefan LAZCANO consult for Tarlov cyst   Indwelling galloway catheter to be placed and pt is going to be discharged home with galloway and follow up as OP with Urology and OP TOV.   -discussed with Urology 03/09. 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 Cook as outpatient. PTA on tacrolimus BID.  - Continue PTA tacrolimus  - Tacrolimus levels in AM  - Consider transplant hepatology     #Diabetes Mellitus Type II  Follows with [facility] as outpatient. Most recent hemolgobin A1c of 7.1 in November 2023. PTA on Januvia 100 mg daily.  - Continue PTA Januvia     #Seizure Disorder  #Recent Subarachnoid Hemorrhage  Recent admission to [facility] 2/9/2024-2/11/2024 for subarachnoid hemorrhage (as well as thumb injury) after fall from seizure. Seizure was felt to be related to hypoglycemic event. Neurology and neurosurgery were both involved during her hospital stay. No EEG or neurosurgical intervention. Was admitted again to [facility] 2/16 for 2/19 for headache as well as wrist/rib pain felt to be related to recent fall.  - Continue PTA topiramate  - Follow up with neurosurgery as previously scheduled     #CKD Stage III, ibuprofen not recommended due to the CKD stage III and the risk of worsening renal disease.  On admission, creatinine at baseline of 1.2-1.5.  - Avoid nephrotoxins               Diet: Combination Diet Regular Diet Adult  Calorie Counts    DVT Prophylaxis: Pneumatic Compression Devices  Rossi Catheter: PRESENT, indication: Acute retention or obstruction, Acute retention or obstruction  Lines: None     Cardiac Monitoring: None  Code Status: Full Code      Clinically Significant Risk Factors Present on Admission              # Hypoalbuminemia: Lowest albumin = 2.4 g/dL at 3/6/2024  5:47 AM, will monitor as appropriate                     Disposition Plan              Shyam Guerra MD  Hospitalist Service, GOLD TEAM 18  [facility]  Securely message with [employer] (more info)  Text page via [facility] Paging/Directory   See signed in provider for up to date coverage information  ______________________________________________________________________    Interval History   No acute events over night, better appeared to be controlled with use of IV Toradol and Tramadol as needed.   Pain service had seen patient and recommendations noted.  MRI of the cervical and thoracic spine obtained on 3/9/2024 reviewed with the patient.  Patient states she is not aware of compression fractures of T4 or T5.  Rossi catheter has sediments and therefore would need to be flushed.  Patient is able to get up and walk independently and does not have any difficulties with mobility.      Urology seen patient.  Voiding trial done prior to discharge but failed and pt required straight catheterization  Neurology consulted and recommended MRI of the T and C spine and [facility] neurosurgery consult for the Tarlov cyst on the sacral region   Patient tearful about uncontrolled pain and her inability to urinate on her own.   She I has claustrophilia and requested medications to help her for the MRI    Patient is very anxious, tearful stating that she has not been seen by the consulting services that she hoped will see her during this hospitalization.  I did talk to the patient and her  , Roly.  Discussed with urology  -I did explain to the patient and her  the findings of the MRI of the pelvis, orthopedic consult will be obtained  -Also consulted GI hepatology for continued of care  Physical Exam   Vital Signs: Temp: 97.7  F (36.5  C) Temp src: Oral BP: 113/79 Pulse: 71   Resp: 16 SpO2: 98 % O2 Device: None (Room air)    Weight: 0 lbs 0 oz    General Appearance: Appears comfortable.  Respiratory: Without wheezes rhonchi or rales.  CTA  Cardiovascular: RRR,  without murmurs  GI: Soft, nontender, plus BS, Rossi catheter in place.  Skin: Without obvious bleeding, bruising or excoriations  EXT/MSK: Decreased range of motion at the hip flexor, due to pain.  Straight leg positive.    Medical Decision Making   Level 2      Data   CBC RESULTS:   Recent Labs   Lab Test 03/06/24  0547   WBC 4.0   RBC 3.12*   HGB 9.9*   HCT 31.6*   *   MCH 31.7   MCHC 31.3*   RDW 12.6        Last Comprehensive Metabolic Panel:  Lab Results   Component Value Date     03/06/2024    POTASSIUM 4.9 03/06/2024    CHLORIDE 110 (H) 03/06/2024    CO2 24 03/06/2024    ANIONGAP 4 (L) 03/06/2024     (H) 03/06/2024    BUN 15.7 03/06/2024    CR 1.21 (H) 03/06/2024    GFRESTIMATED 54 (L) 03/06/2024    MAURI 8.0 (L) 03/06/2024     Color Urine (no units)   Date Value   03/08/2024 Light Yellow   02/25/2021 Light Yellow     Appearance Urine (no units)   Date Value   03/08/2024 Clear   02/25/2021 Clear     Glucose Urine (mg/dL)   Date Value   03/08/2024 Negative   02/25/2021 >1000 (A)     Bilirubin Urine (no units)   Date Value   03/08/2024 Negative   02/25/2021 Negative     Ketones Urine (mg/dL)   Date Value   03/08/2024 Negative   02/25/2021 Negative     Specific Gravity Urine (no units)   Date Value   03/08/2024 1.014   02/25/2021 1.029     pH Urine   Date Value   03/08/2024 5.0   02/25/2021 6.0 pH     Protein Albumin Urine (mg/dL)   Date Value   03/08/2024 Negative   02/25/2021 Negative     Nitrite Urine (no units)   Date Value   03/08/2024 Negative   02/25/2021 Negative     Leukocyte Esterase Urine (no units)   Date Value   03/08/2024 Negative   02/25/2021 Negative         ------------------------- PAST 24 HR DATA REVIEWED -----------------------------------------------

## 2024-03-10 NOTE — PLAN OF CARE
Speech Language Pathology: Orders received. Chart reviewed and discussed with care team.? Speech Language Pathology not indicated due to pt denies dysphagia and no concerns for diet tolerance per RN. Pt does not have respiratory status concerns and pt declined eval d/t normal swallow function.? Defer discharge recommendations to MD.? Will complete orders.

## 2024-03-10 NOTE — PLAN OF CARE
Pt is A&Ox4. VSS. LS clear,on RA. BS active, LBM on 3/9/2024. Rossi patent and draining well. Pain managed with toradol and tramadol. Pt up independently. R PIV is patent and SL. L arm hard shell cast in place. Continue to monitor.

## 2024-03-10 NOTE — PLAN OF CARE
Goal Outcome Evaluation:                        VS: Temp: 98.1  F (36.7  C) Temp src: Oral BP: 113/76 Pulse: 71   Resp: 18 SpO2: 98 % O2 Device: None (Room air)      Output: Adequate amount in galloway catheter    Last BM: 3/8   Activity: Up independently  Steady gait   Skin: Healing bruise to L upper FA   Pain: Managed with PRN Tramadol    Neuro: Alert and oriented x4.    Dressing: Cast to L arm:CDI    Diet: Regular    LDA: R PIV-SL    Equipment: Cellphone and personal belongings at bedside   Plan: TBD   Additional Info:

## 2024-03-10 NOTE — PLAN OF CARE
Goal Outcome Evaluation:      Plan of Care Reviewed With: patient    Overall Patient Progress: improvingOverall Patient Progress: improving    Outcome Evaluation: pt went down for MRI at 3pm today, tolerated well per pt report. given ativan prior to prcedure for anxiety. pt is A&O x4, able to make needs known. up ad adriano, independent in room. galloway in place, drainging adequately. catheter cares completed. continue plan of care      VS: Temp: 97.8  F (36.6  C) Temp src: Oral BP: 110/73 Pulse: 68   Resp: 16 SpO2: 98 % O2 Device: None (Room air)       O2: SpO2>90% on room air, denies shortness of breath and chest pain   Output: Galloway catheter in place, draining adequately. Catheter cares done   Last BM: 03/08/2024   Activity: Up ad adriano, independent   Skin: intact   Pain: Pelvic pain Managed with scheduled Tylenol, prn toradol, tramadol   CMS: A&O x4, denies N/T   Dressing: L arm cast   Diet: Regular, denies nausea    LDA: R PIV SL   Equipment: IV pole, personal belongings   Plan: MRI done today, plan TBD   Additional Info:

## 2024-03-11 VITALS
DIASTOLIC BLOOD PRESSURE: 78 MMHG | OXYGEN SATURATION: 97 % | HEART RATE: 72 BPM | RESPIRATION RATE: 18 BRPM | TEMPERATURE: 97.9 F | SYSTOLIC BLOOD PRESSURE: 121 MMHG

## 2024-03-11 PROCEDURE — 250N000013 HC RX MED GY IP 250 OP 250 PS 637

## 2024-03-11 PROCEDURE — 99239 HOSP IP/OBS DSCHRG MGMT >30: CPT | Performed by: INTERNAL MEDICINE

## 2024-03-11 PROCEDURE — 96376 TX/PRO/DX INJ SAME DRUG ADON: CPT

## 2024-03-11 PROCEDURE — 250N000012 HC RX MED GY IP 250 OP 636 PS 637

## 2024-03-11 PROCEDURE — G0378 HOSPITAL OBSERVATION PER HR: HCPCS

## 2024-03-11 PROCEDURE — 250N000013 HC RX MED GY IP 250 OP 250 PS 637: Performed by: STUDENT IN AN ORGANIZED HEALTH CARE EDUCATION/TRAINING PROGRAM

## 2024-03-11 PROCEDURE — 250N000013 HC RX MED GY IP 250 OP 250 PS 637: Performed by: INTERNAL MEDICINE

## 2024-03-11 PROCEDURE — 250N000011 HC RX IP 250 OP 636: Performed by: INTERNAL MEDICINE

## 2024-03-11 PROCEDURE — 99222 1ST HOSP IP/OBS MODERATE 55: CPT | Performed by: ANESTHESIOLOGY

## 2024-03-11 RX ORDER — GABAPENTIN 100 MG/1
300 CAPSULE ORAL 3 TIMES DAILY
Qty: 270 CAPSULE | Refills: 0 | Status: SHIPPED | OUTPATIENT
Start: 2024-03-11 | End: 2024-04-10

## 2024-03-11 RX ORDER — ACETAMINOPHEN 500 MG
500 TABLET ORAL
Qty: 60 TABLET | Refills: 0 | Status: SHIPPED | OUTPATIENT
Start: 2024-03-11

## 2024-03-11 RX ORDER — TRAMADOL HYDROCHLORIDE 50 MG/1
50 TABLET ORAL EVERY 6 HOURS PRN
Qty: 30 TABLET | Refills: 0 | Status: SHIPPED | OUTPATIENT
Start: 2024-03-11 | End: 2024-04-10

## 2024-03-11 RX ADMIN — SENNOSIDES 8.6 MG: 8.6 TABLET, FILM COATED ORAL at 09:01

## 2024-03-11 RX ADMIN — ESCITALOPRAM OXALATE 20 MG: 20 TABLET ORAL at 08:49

## 2024-03-11 RX ADMIN — POLYETHYLENE GLYCOL 3350 17 G: 17 POWDER, FOR SOLUTION ORAL at 08:47

## 2024-03-11 RX ADMIN — GLYCERIN, PETROLATUM, PHENYLEPHRINE HCL, PRAMOXINE HCL: 144; 2.5; 10; 15 CREAM TOPICAL at 08:53

## 2024-03-11 RX ADMIN — GABAPENTIN 300 MG: 300 CAPSULE ORAL at 13:47

## 2024-03-11 RX ADMIN — LEVOTHYROXINE SODIUM 125 MCG: 125 TABLET ORAL at 08:49

## 2024-03-11 RX ADMIN — TACROLIMUS 2 MG: 1 CAPSULE ORAL at 08:49

## 2024-03-11 RX ADMIN — Medication 1 TABLET: at 08:49

## 2024-03-11 RX ADMIN — FOLIC ACID 1 MG: 1 TABLET ORAL at 08:49

## 2024-03-11 RX ADMIN — SITAGLIPTIN 100 MG: 100 TABLET, FILM COATED ORAL at 08:50

## 2024-03-11 RX ADMIN — KETOROLAC TROMETHAMINE 30 MG: 30 INJECTION, SOLUTION INTRAMUSCULAR; INTRAVENOUS at 00:04

## 2024-03-11 RX ADMIN — ACETAMINOPHEN 650 MG: 325 TABLET, FILM COATED ORAL at 09:00

## 2024-03-11 RX ADMIN — TRAMADOL HYDROCHLORIDE 50 MG: 50 TABLET, COATED ORAL at 12:35

## 2024-03-11 RX ADMIN — TRAMADOL HYDROCHLORIDE 50 MG: 50 TABLET, COATED ORAL at 05:28

## 2024-03-11 RX ADMIN — METHOCARBAMOL 500 MG: 500 TABLET ORAL at 12:35

## 2024-03-11 RX ADMIN — ONDANSETRON HYDROCHLORIDE 4 MG: 4 TABLET, FILM COATED ORAL at 12:35

## 2024-03-11 RX ADMIN — METHOCARBAMOL 500 MG: 500 TABLET ORAL at 16:17

## 2024-03-11 RX ADMIN — GABAPENTIN 300 MG: 300 CAPSULE ORAL at 08:49

## 2024-03-11 RX ADMIN — SENNOSIDES AND DOCUSATE SODIUM 1 TABLET: 8.6; 5 TABLET ORAL at 08:49

## 2024-03-11 RX ADMIN — METHOCARBAMOL 500 MG: 500 TABLET ORAL at 08:49

## 2024-03-11 RX ADMIN — GLYCERIN, PETROLATUM, PHENYLEPHRINE HCL, PRAMOXINE HCL: 144; 2.5; 10; 15 CREAM TOPICAL at 13:47

## 2024-03-11 ASSESSMENT — ACTIVITIES OF DAILY LIVING (ADL)
ADLS_ACUITY_SCORE: 45
ADLS_ACUITY_SCORE: 34
ADLS_ACUITY_SCORE: 45

## 2024-03-11 NOTE — CONSULTS
Pain Service Consultation Note  [facility]      Patient Name: Susan Puckett  MRN: [id number]   Age: 53 year old  Sex: female  Date: March 11, 2024                                      Reviewed: Yes    Referring Provider:  Mari   Referring Service:  Hospitalist  Reason for Consultation: Pain    Assessment/Recommendations:  53 year old female with a PMHx of liver transplant on 2022, DMII, AILIN, CKD with right pelvic pain.     Plan:   - Transition from IV to oral medications    - Acetaminophen 500mg qid        - Patient stated she was told she can take acetaminophen up to 2g with her liver txn.         - Would check with her Transplant team to confirm and check on duration she is able to do this.      - NSAIDs, ibuprofen 600mg tid if ok with CKD and transplant. Can consider meloxicam 7.5mg every day instead     - Start home Gabapentin 200mg tid can increase to 300 tid.     - Can continue tramadol 50mg prn    Thank you for the opportunity to participate in the care of Susan Puckett  Pain Service will continue to follow.    Discussed with attending anesthesiologist  Primary Service Contacted with Recommendations? Yes    Mike Cornelius, DO  3/11/2024      Chief Complaint:  Right pelvic pain      History of Present Illness:  Susan Puckett is a 53 year old female with chronic pelvic pain.  The pain is reported to be chronic, located in the right pelvic area, and radiates down her right leg some but not all the time.  Current pain is rated at 7/10 and goal is 3or4/10. The patient is with chronic pain. The patient has a minimal opioid tolerance.      Pain Assessment:   Description of Pain: sharp and shooting  Location: right posterior pelvic area  Current Pain: 7/10  Goal: 4/10  Baseline Pain Level: 10/10 at home per patient  Radiating   Constant      Past Medical History:  Past Medical History:   Diagnosis Date    Anemia     Anxiety     Cold sore     Depressive disorder     Diabetes (H)     Type  2 DM/No Insulin     E. coli UTI 11/19/2022    Encephalopathy     Esophageal varices without bleeding (H)     grade I    History of blood transfusion     10/2019    History of seizure     diabetic seizure    Infertility management 11/05/2015    Insomnia     Liver cirrhosis secondary to CUETO (H) 05/28/2020    Migraine     Pleural effusion     Thrombocytopenia (H24)     Thyroid disease          Family History:    Family History   Problem Relation Age of Onset    Anesthesia Reaction Nephew         PONV    Bleeding Disorder No family hx of     Clotting Disorder No family hx of        Social History:  Social History     Tobacco Use    Smoking status: Never    Smokeless tobacco: Never   Substance Use Topics    Alcohol use: Not Currently     Comment: Last drink was in 2017             Review of Systems:  Complete ROS reviewed. Unless otherwise noted, all other systems found to be negative.        Laboratory Results:  Recent Labs   Lab Test 03/06/24  0547 11/05/22  0600 11/04/22  0607 11/02/22  1815 11/02/22  1625   INR  --   --  1.32*   < > 1.94*      < >  --    < > 147*   PTT  --   --   --   --  40*   BUN 15.7   < > 38.2*   < > 12.4    < > = values in this interval not displayed.       Allergies:  Allergies   Allergen Reactions    Methylprednisolone Hives     Per patient, reaction occurred in 1999 or earlier. Broke out in round, flat hives in neck and chest area. No shortness of breath or swelling. Unknown if reaction occurred immediately after administration.     Adhesive Tape Itching    Oxycodone      slurring    Droperidol Anxiety    Nsaids Other (See Comments)     GI bleed.    Prednisone Anxiety, Hives and Rash     Per patient she has tolerated this since         Current Pain Related Medications:  Medications related to Pain Management (From now, onward)      Start     Dose/Rate Route Frequency Ordered Stop    03/09/24 1337  traMADol (ULTRAM) tablet 50 mg         50 mg Oral EVERY 6 HOURS PRN 03/09/24 1339       03/09/24 1145  ketorolac (TORADOL) injection 30 mg         30 mg Intravenous EVERY 6 HOURS PRN 03/09/24 1146 03/14/24 1244    03/03/24 1100  methocarbamol (ROBAXIN) tablet 500 mg         500 mg Oral 4 TIMES DAILY 03/03/24 1026      03/03/24 0644  acetaminophen (TYLENOL) tablet 650 mg        See Hyperspace for full Linked Orders Report.    650 mg Oral EVERY 4 HOURS PRN 03/03/24 0645      03/03/24 0644  acetaminophen (TYLENOL) Suppository 650 mg        See Hyperspace for full Linked Orders Report.    650 mg Rectal EVERY 4 HOURS PRN 03/03/24 0645      03/02/24 2200  topiramate (TOPAMAX) tablet 100 mg        Note to Pharmacy: PTA Sig:Take 100 mg by mouth At Bedtime      100 mg Oral AT BEDTIME 03/02/24 1941 03/02/24 2000  gabapentin (NEURONTIN) capsule 300 mg        Note to Pharmacy: PTA Sig:Take 300 mg by mouth 3 times daily      300 mg Oral 3 TIMES DAILY 03/02/24 1842 03/02/24 2000  polyethylene glycol (MIRALAX) Packet 17 g         17 g Oral 2 TIMES DAILY 03/02/24 1948      03/02/24 2000  sennosides (SENOKOT) tablet 8.6 mg         8.6 mg Oral 2 TIMES DAILY 03/02/24 1948 03/02/24 1839  hydrOXYzine HCl (ATARAX) tablet 25 mg        See Hyperspace for full Linked Orders Report.    25 mg Oral EVERY 6 HOURS PRN 03/02/24 1840      03/02/24 1839  hydrOXYzine HCl (ATARAX) tablet 50 mg        See Hyperspace for full Linked Orders Report.    50 mg Oral EVERY 6 HOURS PRN 03/02/24 1840      03/02/24 1837  lidocaine (XYLOCAINE) 2 % external gel          Topical EVERY 4 HOURS PRN 03/02/24 1838      03/02/24 1815  lidocaine 1 % 0.1-1 mL         0.1-1 mL Other EVERY 1 HOUR PRN 03/02/24 1818      03/02/24 1815  lidocaine (LMX4) cream          Topical EVERY 1 HOUR PRN 03/02/24 1818      03/02/24 1815  senna-docusate (SENOKOT-S/PERICOLACE) 8.6-50 MG per tablet 1 tablet        See Hyperspace for full Linked Orders Report.    1 tablet Oral 2 TIMES DAILY PRN 03/02/24 1818      03/02/24 1815  senna-docusate  "(SENOKOT-S/PERICOLACE) 8.6-50 MG per tablet 2 tablet        See Hyperspace for full Linked Orders Report.    2 tablet Oral 2 TIMES DAILY PRN 03/02/24 1818                Physical Exam:  Vitals: /73 (BP Location: Right arm)   Pulse 60   Temp 97.5  F (36.4  C) (Oral)   Resp 18   SpO2 98%     Physical Exam:   CONSTITUTIONAL/GENERAL APPEARANCE:  NAD  EYES: EOMI, sclera anicteric, PERRLA  ENT/NECK: atraumatic, lips and oral mucous membranes dry  RESPIRATORY: non-labored breathing. No cough, wheeze  CV: RRR, no audible rubs, gallops, or murmurs  ABDOMEN: Soft, non-tender, non-distended  MUSCULOSKELETAL/BACK/SPINE/EXTREMITIES: Moves all extremities purposefully.  No edema or obvious joint deformities   NEURO: Alert and Oriented x3. Answers questions appropriately  SKIN/VASCULAR EXAM:  No jaundice, no visible rashes or lesions      Please see A&P for additional details of medical decision making.      Acute Inpatient Pain Service [facility]  Hours of pain coverage 24/7   Page via [employer]- Please Page the Pain Team Via [employer]: \"PAIN MANAGEMENT ACUTE INPATIENT/ [facility] [place]            "

## 2024-03-11 NOTE — PLAN OF CARE
Goal Outcome Evaluation:                        VS: VSS and afebrile   Output: Adequate amount in galloway cath   Last BM: 3/10 and passing gas per pt report   Activity: Up ad adriano  Steady gait   Skin: Bruises to L and R FA   Pain: Managed with PRN Toradol and tramadol    Neuro: Alert and oriented x4   Dressing: Cast to L lower FA-intact   Diet: Regular   LDA: R-PIV   Equipment: Cell phone and personal belonging   Plan: Waiting for pain consult  Discharge with galloway catheter   Additional Info:

## 2024-03-11 NOTE — PROGRESS NOTES
DISCHARGE SUMMARY    Pt discharging to: home  Transportation: family vehicle  AVS given and discussed: Pt was given AVS and pt states understanding of content. Pt has no further questions.   Stoplight Tool given and discussed: Yes, no further questions.  Medications given: Prescription was given. Medication list explained. No further questions.   Belongings returned: Yes, ensured all belongings packed and sent with pt. No items in security.   Comments: Escorted safely to elevators. Pt left at 1951.    A/Ox4, VSS. Pain is tolerable. Ambulatory and independent. Rossi Catheter emptied prior to discharge. Pt and  educated on catheter care.

## 2024-03-11 NOTE — PLAN OF CARE
VS: Blood pressure 113/73, pulse 60, temperature 97.5  F (36.4  C), temperature source Oral, resp. rate 18, SpO2 98%, not currently breastfeeding.   O2: RA   Output: Adequate amount in galloway cath   Last BM: 3/11, passing gas per pt report   Activity: Up ad adriano with steady gait   Skin: Bruises to L and R FA   Pain: Managed with PRN Tylenol and tramadol    CMS: A&O x4   Dressing: Cast to L lower FA-intact   Diet: Regular   LDA: R PIV SL   Equipment: Cell phone and personal belongings   Plan: Ready for discharge today with galloway catheter.    Additional Info:

## 2024-03-11 NOTE — PLAN OF CARE
Goal Outcome Evaluation:      Plan of Care Reviewed With: patient    Overall Patient Progress: improvingOverall Patient Progress: improving    Outcome Evaluation: pt is A&O x4, able to make needs known. very pleasant disposition this evening. pt reports pain very well managed with prn tramadol and toradol. galloway catheter in place drainign adequately, clear yellow urine. catheter cares completed by patient in shower. continue plan of care      VS: Temp: 98.3  F (36.8  C) Temp src: Oral BP: 107/74 Pulse: 71   Resp: 16 SpO2: 100 % O2 Device: None (Room air)       O2: SpO2>90% on room air, denies SOB and CP   Output: Galloway cathter in place, draining adequately. Catheter cares done per pt while in shower   Last BM: 0308/2024   Activity: Up ad adriano, independent, steady gait   Skin: LUE hard cast, BUE bruising   Pain: Managed well with prn toradol and tramadol   CMS: A&O x4, denies N/T   Dressing: LUE cast CDI, R PIV transparent dressing CDI   Diet: Regular, tolerating well, denies nausea   LDA: R PIV SL, LUE hard cast   Equipment: IV pole, personal belongings   Plan: TBD, discharging with galloway and follow up with urology in 2wks.    Additional Info:

## 2024-03-11 NOTE — DISCHARGE SUMMARY
Medicine Discharge Summary       Susan Puckett MRN# [id number]   YOB: 1970 Age: 53 year old     Date of Admission:  3/2/2024  Date of Discharge:  3/11/2024  Admitting Physician:  Cynthia Bella MD  Discharge Physician:  Shayy Clark  Discharging Service:  Hospital Medicine     Primary Provider: Harleen Billy              Discharge Diagnosis:   Acute urinary retention  Failed Voiding Trial  Pelvic pain  Groin pain  Compression fractures, T4 and T5 chronic  Thoracic spine perineural cysts  Tarlov cyst, sacral  Liver transplant pt  CKD III         Consultations:   Neurosurgery  Neurology  GI hepatology  Urology  Pain service         Hospital Course     Susan Puckett is a 53 year old female with a 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 Of note, patient with recent admission to [facility] 2/21/2024-2/26/2024 for similar symptoms.            # acute Groin pain, buttock pain and pain in the pelvic floor, etiology unspecified.  CT of the pelvis, MRI of the pelvis, CT of the abdomen and pelvis done reviewed.  Imaging studies done negative for any pelvic bone fractures or any bony abnormalities. Left Hamstring Tendinopathy, Ortho consult obtained.  Recommending outpatient therapy and musculoskeletal sports medicine consult as outpatient.  Neurosurgery recommended trial of NSAIDS and pain service consultation.  Anesthesiology/pain service consulted.  Recommending meloxicam 7.5 mg daily, to increase gabapentin to 300 mg p.o. 3 times daily and to continue tramadol as needed.  Recommending also Tylenol 500 mg p.o. 4 times daily     Transplant Hepatology okay with patient receiving IV toradol and Tramadol to manage her pelvic  and  groin pain  # Compression fractures T4-T5 endplates.  Seen on MRI from 2021 considered chronic.  # Thoracic spine multilevel perineural cysts the largest at T9-T10, 1 cm..  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  # Recent history of fall, on February 9 secondary to seizure episode, witnessed seizure  # Multiple rib fractures on both the left ribs, multiple on the right ribs.  She continues to have some chest wall pain, requesting repeat imaging.  --MR of pelvis showed left sided tendinopathy and mild intramuscular signal abnormality within gluteus medius bilaterally.   -- On examination appears MSK in nature.  -- Still troublesome to the patient and states that she is at difficulty ambulating.  However she is able to get up and walk without using any assistive device.  Pain is fairly well-controlled with oxycodone.  -- Conservative management including              -- PTA gabapentin              -- Robaxin 4 times daily (decreased dose to 500 mg from 750, as patient has drowsy this morning)  -- Physical therapy consultation appreciate  -- OB/GYN consultation appreciated     # External hemorrhoids, # Constipation  -- Continue Preparation H, Tucks pads, sitz bath.  --Patient had x-ray of the abdomen showing significant stool burden  -- Bowel regimen including MiraLAX scheduled, senna scheduled,  -Had a good BM 3/4.  Abdominal exam is benign.     # Urinary retention, acute.  Rossi catheter was placed.  No history of a chronic Rossi, neurogenic bladder.  Patient is not willing to go home with a chronic Rossi.  Urology consult has been placed this is discussed with the on-call resident, Dr. Edmundo Spring.  Patient seen  Recommendations for increased mobility and ambulation, hyperglycemia antegrade and retrograde bowel regimen, to the mid use of narcotic pain medications, to avoid polypharmacy and anticholinergics if able.  Urology I agree with outpatient pelvic floor PT.   Trial of voiding closer to discharge.  voiding trial fails, patient will go home with an indwelling Galloway catheter and follow-up with urology outpatient.  # Failed Voiding Trial 03/07. Straight cath revealed a UOP of 600 ml . Neurology consulted and recommended obtaining an MRI of the C and T spine and stefan LAZCANO consult for Tarlov cyst , follow up with NS as OP  Indwelling galloway catheter to be placed and pt is going to be discharged home with galloway and follow up as OP with Urology and OP TOV.   -discussed with Urology 03/09. I relayed NS's recommendations that Urodynamic studies be done within two weeks of discharge      # Recent CAUTI  -- UA showing nitrates,  -- Galloway catheter placed on admission  -#History of ESLD secondary to MASLD #s/p DDLT in 2022  Follows with Dr. Cook as outpatient. PTA on tacrolimus BID.  - Continue PTA tacrolimus  - Tacrolimus levels checked ok       #Diabetes Mellitus Type II  Follows with [facility] as outpatient. Most recent hemolgobin A1c of 7.1 in November 2023. PTA on Januvia 100 mg daily.  - Continue PTA Januvia     #Seizure Disorder  #Recent Subarachnoid Hemorrhage  Recent admission to [facility] 2/9/2024-2/11/2024 for subarachnoid hemorrhage (as well as thumb injury) after fall from seizure. Seizure was felt to be related to hypoglycemic event. Neurology and neurosurgery were both involved during her hospital stay. No EEG or neurosurgical intervention. Was admitted again to [facility] 2/16 for 2/19 for headache as well as wrist/rib pain felt to be related to recent fall.  - Continue PTA topiramate  - Follow up with neurosurgery as previously scheduled     #CKD Stage III, ibuprofen not recommended due to the CKD stage III and the risk of worsening renal disease.  On admission, creatinine at baseline of 1.2-1.5.  - Avoid nephrotoxins  -after discussion with GI hepatology decided against any NSAIDS use as OP, therefore discontinued meloxicam        On Exam ;   Alert and oriented  ". No acute distress  Vital signs:  Temp: 97.5  F (36.4  C) Temp src: Oral BP: 113/73 Pulse: 60   Resp: 18 SpO2: 98 % O2 Device: None (Room air)        Estimated body mass index is 24.21 kg/m  as calculated from the following:    Height as of 2/29/24: 1.676 m (5' 6\").    Weight as of 2/29/24: 68 kg (150 lb).  Neck ; supple , no JVD  Chest ; equal BS bilaterally , no rales or rhonchi   CVS; RRR, no murmur /rubs or gallops  GI ; soft abdomen, non tender, BS positive  Ext ; no edema , no cynosis  Neuro ; CN 2 to 12 grossly intact , No Facial asymmetry noticed  .  Psych ; appropriate mood and effect  Skin ; no rash or purpura on exposed skin     ROUTINE IP LABS (Last four results)  BMP  Recent Labs   Lab 03/06/24  1736 03/06/24  0547 03/06/24  0249 03/05/24  0552   NA  --  138  --  138   POTASSIUM  --  4.9  --  4.9   CHLORIDE  --  110*  --  110*   MAURI  --  8.0*  --  8.0*   CO2  --  24  --  23   BUN  --  15.7  --  13.1   CR  --  1.21*  --  1.20*   * 107* 130* 112*     CBC  Recent Labs   Lab 03/06/24  0547   WBC 4.0   RBC 3.12*   HGB 9.9*   HCT 31.6*   *   MCH 31.7   MCHC 31.3*   RDW 12.6        INRNo lab results found in last 7 days.                 Discharge Medications:     Current Discharge Medication List        START taking these medications    Details   acetaminophen (TYLENOL) 500 MG tablet Take 1 tablet (500 mg) by mouth 4 times daily (before meals and nightly)  Qty: 60 tablet, Refills: 0    Associated Diagnoses: Pelvic pain in female; Urinary retention      traMADol (ULTRAM) 50 MG tablet Take 1 tablet (50 mg) by mouth every 6 hours as needed for severe pain or moderate pain  Qty: 30 tablet, Refills: 0    Associated Diagnoses: Pelvic pain in female; Urinary retention           CONTINUE these medications which have CHANGED    Details   gabapentin (NEURONTIN) 100 MG capsule Take 3 capsules (300 mg) by mouth 3 times daily for 30 days  Qty: 270 capsule, Refills: 0    Associated Diagnoses: Pelvic " pain in female; Urinary retention           CONTINUE these medications which have NOT CHANGED    Details   calcium carbonate-vitamin D (OSCAL) 500-5 MG-MCG tablet Take 1 tablet by mouth 2 times daily      cyanocobalamin (VITAMIN B-12) 1000 MCG tablet Take 1,000 mcg by mouth every 7 days on Wednesdays      escitalopram (LEXAPRO) 20 MG tablet Take 30 mg by mouth daily      folic acid (FOLVITE) 1 MG tablet Take 1 mg by mouth every morning      levothyroxine (SYNTHROID/LEVOTHROID) 125 MCG tablet Take 125 mcg by mouth daily      melatonin 5 MG tablet Take 5 mg by mouth At Bedtime      metoclopramide (REGLAN) 5 MG tablet Take 1 tablet (5 mg) by mouth 3 times daily as needed  Qty: 100 tablet, Refills: 3    Associated Diagnoses: Nausea      naratriptan (AMERGE) 2.5 MG tablet Take 2.5 mg by mouth at onset of headache for migraine      ondansetron (ZOFRAN ODT) 8 MG ODT tab Take 1 tablet (8 mg) by mouth every 8 hours as needed for nausea  Qty: 30 tablet, Refills: 0    Associated Diagnoses: Other headache syndrome      sitagliptin (JANUVIA) 100 MG tablet Take 100 mg by mouth daily      tacrolimus (GENERIC) 1 MG capsule Take 2 capsules (2 mg) by mouth every 12 hours  Qty: 540 capsule, Refills: 3    Comments: TXP DT 11/2/2022 (Liver) TXP Dischg DT 11/10/2022 DX Liver replaced by transplant Z94.4 [facility] ([place], [place])  Associated Diagnoses: Liver replaced by transplant (H)      topiramate (TOPAMAX) 50 MG tablet Take 100 mg by mouth at bedtime           STOP taking these medications       oxyBUTYnin (DITROPAN) 5 MG tablet Comments:   Reason for Stopping:         traZODone (DESYREL) 50 MG tablet Comments:   Reason for Stopping:                    Discharge Instructions and Follow-Up:     Discharge Procedure Orders   Physical Therapy  Referral   Standing Status: Future   Referral Priority: Routine Referral Type: Rehab Therapy Physical Therapy   Number of Visits  Requested: 1     Reason for your hospital stay   Order Comments: Urinary retention and pelvic floor pain     Activity   Order Comments: Your activity upon discharge: activity as tolerated     Order Specific Question Answer Comments   Is discharge order? Yes      Follow Up ([facility])   Order Comments: Follow up with primary care provider, Harleen Billy, within 7 days for hospital follow- up.  The following labs/tests are recommended: BMP.      Appointments on [place] and/or [facility] (with [facility] or [facility] provider or service). Call [phone] if you haven't heard regarding these appointments within 7 days of discharge.     Follow Up ([facility])   Order Comments: Follow up with  [facility] Neurosurgery, Urology and Liver Transplant services as OP    Appointments on [facility]/or [facility] (with [facility] or [facility] provider or service). Call [phone] if you haven't heard regarding these appointments within 7 days of discharge.     Diet   Order Comments: Follow this diet upon discharge: Orders Placed This Encounter        Combination Diet Regular Diet Adult     Order Specific Question Answer Comments   Is discharge order? Yes          Physical Therapy  Referral      Reason for your hospital stay    Urinary retention and pelvic floor pain     Activity    Your activity upon discharge: activity as tolerated     Follow Up ([facility])    Follow up with primary care provider, Harleen Billy, within 7 days for hospital follow- up.  The following labs/tests are recommended: BMP.      Appointments on [place] and/or [facility] (with [facility] or [facility] provider or service). Call [phone] if you haven't heard regarding these appointments within 7 days of discharge.     Follow Up ([facility])    Follow up with  [facility] Neurosurgery, Urology and Liver Transplant services as OP    Appointments on [place] and/or [facility] (with [facility] or [facility] provider or service). Call [phone] if you haven't heard  regarding these appointments within 7 days of discharge.     Diet    Follow this diet upon discharge: Orders Placed This Encounter        Combination Diet Regular Diet Adult                Discharge Disposition:   40 minutes spent in discharge, including >50% in counseling and coordination of care, medication review and plan of care recommended on follow up. Questions were answered to satisfaction       Shyam Guerra MD  Internal Medicine Hospitalist & Staff Physician  [facility]

## 2024-03-13 ENCOUNTER — PATIENT OUTREACH (OUTPATIENT)
Dept: CARE COORDINATION | Facility: CLINIC | Age: 54
End: 2024-03-13
Payer: COMMERCIAL

## 2024-03-13 NOTE — PROGRESS NOTES
Connected Care Resource Center:   [facility] Care Resource Center Contact  RUST/Voicemail     Clinical Data: Post-Discharge Outreach     Outreach attempted x 2.  Left message on patient's voicemail, providing [facility]'s central phone number of [phone] ([phone]) for questions/concerns and/or to schedule an appt with an [facility] provider, if they do not have a PCP.      Plan:  [facility] will do no further outreaches at this time.       ARNALDO Koo  Connected Care Resource Center, [facility]    *Connected Care Resource Team does NOT follow patient ongoing. Referrals are identified based on internal discharge reports and the outreach is to ensure patient has an understanding of their discharge instructions.

## 2024-03-15 ENCOUNTER — TELEPHONE (OUTPATIENT)
Dept: NEUROSURGERY | Facility: CLINIC | Age: 54
End: 2024-03-15
Payer: COMMERCIAL

## 2024-03-17 ENCOUNTER — HEALTH MAINTENANCE LETTER (OUTPATIENT)
Age: 54
End: 2024-03-17

## 2024-03-23 NOTE — PROGRESS NOTES
Connected with pt for check in. Pt stated that her spouse, Roly, was recently diagnosed with bladder cancer. Pt and Roly are trying to cope with this new diagnosis and reach out to family and friends for support. Encouraged pt to schedule an appointment with Sancho Gaines for additional support.     Pt reported compliance with rifaximin and lactulose and denied confusion. Pt is taking lactulose 3 times per day and averaging 3-5 BMs/day. Pt denied lower extremity edema and ascites and continues to take spironolactone 150 mg by mouth daily and furosemide 60 mg by mouth daily. Encouraged pt to stay hydrated with diuretics.    Pt reported increased fatigue. Pt's hemoglobin on 9/28 stable at 10.2. Discussed that increased fatigue could be related to anemia. Also discussed the importance of a consistent sleep schedule. Pt stated that she is not good about going to bed at the same time every night and will try to be better about this. Pt still needs to have folate lab drawn. Pt confirmed that lab received order for folate. Pt needs to fast for this lab and plans to get this drawn within 1-2 weeks.    Pt continues to have uncontrolled itching. Pt was prescribed rifampin 300 mg by mouth daily for pruritis. Pt stated that this initially helped but pruritis has worsened again.    Will follow up with Dr. Cook on pt's itching and reach out with any changes to plan of care. Plan to check in with pt in 3-4 weeks. Pt verbalized understanding.      
Per Dr. Cook, pt can increase rifampin. New prescription for rifampin 300 mg capsules, Take 1 capsule by mouth 2 times daily. [employer] message discussing medication change sent to pt.   
yes

## 2024-03-25 NOTE — PLAN OF CARE
Major Shift Events:  Lethargic, neuro status intact; anxious - atarax given. HR NSR. BP stable. Lungs clear, oxygen weaned to 1LPM per NC. NG removed, patient tolerating meds w/ sips (one pill at a time). Urethral cath in place, urine output green (meth blue), marginal output - MD notified and following. Incision cdi. JPx2, leaking at insertion site, TERESITA 2 w/ large outputs. Up to chair x2 w/ assist of 2.  Plan: Continue to monitor closely, transfer to  when bed available.   For vital signs and complete assessments, please see documentation flowsheets.        Detail Level: Detailed

## 2024-03-26 NOTE — TELEPHONE ENCOUNTER
Action 2024 JTV 4:36 PM    Action Taken CSS sent an urgent request to [facility] for images to be pused to [facility].   MEDICAL RECORDS REQUEST   [facility]  Urology Clinic  [address] [place] [id number]  PHONE: [phone]  Fax: [phone]        FUTURE VISIT INFORMATION                                                   Susan Puckett, : 1970 scheduled for future visit at [facility]    APPOINTMENT INFORMATION:  Date: 2024  Provider:  Raphael Wu MD  Reason for Visit/Diagnosis: pelvic floor dysfunction      RECORDS REQUESTED FOR VISIT                                                     NOTES  STATUS/DETAILS   OFFICE NOTE from other specialist  YES, 2024 -- Harleen Billy PA-C @ [facility]  2024 -- Laya Berrios APRN, CNP @  [facility]   DISCHARGE REPORT from the ER  YES, 2024 -- [facility] ED   MEDICATION LIST  yes   LABS     URINALYSIS ([facility])  yes   IMAGES                      YES, 2024 -- XR ABD  2024 -- MR PELVIC  2024 -- MR LUMBAR SPINE  2024 -- CT PELVIS BONE  2024 -- CT LUMBAR SPINE    [facility]  2024, 2024, 2024 -- XR ABD  2024 - US RENAL     PRE-VISIT CHECKLIST      Joint diagnostic appointment coordinated correctly          (ensure right order & amount of time) Yes   RECORD COLLECTION COMPLETE yes

## 2024-03-27 ENCOUNTER — PRE VISIT (OUTPATIENT)
Dept: UROLOGY | Facility: CLINIC | Age: 54
End: 2024-03-27

## 2024-03-27 NOTE — TELEPHONE ENCOUNTER
Reason for Visit: Consult on Pelvic Floor Dysfunction     Diagnosis: Pelvic Floor Dysfunction     Relevant information: [facility] Referral by Harleen Billy PA-C, 03/18/2024  [facility] Referral by Laya Berrios APRN, SUSANA, by 03/12/2024     Records/imaging/labs/orders: All available in Epic    Pt called: No need for a call    Rooming Requirements: Collect Urine/PVR    Alli Zambrano MA  3/27/2024  8:44 AM

## 2024-03-29 ENCOUNTER — PRE VISIT (OUTPATIENT)
Dept: UROLOGY | Facility: CLINIC | Age: 54
End: 2024-03-29

## 2024-03-29 ENCOUNTER — OFFICE VISIT (OUTPATIENT)
Dept: UROLOGY | Facility: CLINIC | Age: 54
End: 2024-03-29
Payer: COMMERCIAL

## 2024-03-29 VITALS — HEART RATE: 87 BPM | DIASTOLIC BLOOD PRESSURE: 79 MMHG | SYSTOLIC BLOOD PRESSURE: 118 MMHG

## 2024-03-29 DIAGNOSIS — R10.2 PELVIC PAIN IN FEMALE: ICD-10-CM

## 2024-03-29 DIAGNOSIS — R33.9 URINARY RETENTION: Primary | ICD-10-CM

## 2024-03-29 PROCEDURE — 99214 OFFICE O/P EST MOD 30 MIN: CPT | Mod: 25 | Performed by: OBSTETRICS & GYNECOLOGY

## 2024-03-29 PROCEDURE — 51798 US URINE CAPACITY MEASURE: CPT | Performed by: OBSTETRICS & GYNECOLOGY

## 2024-03-29 ASSESSMENT — PAIN SCALES - GENERAL: PAINLEVEL: WORST PAIN (10)

## 2024-03-29 NOTE — PROGRESS NOTES
March 29, 2024    Referring Provider: Referred Self, MD  No address on file    Primary Care Provider: Harleen Billy    CC: pelvic floor pain    HPI:  Susan Puckett is a 53 year old female who presents for evaluation of her pelvic floor symptoms. Susan reports she has been experiencing right sided pelvic floor pain since a stroke in early Feb. Her pain is 10/10, make it difficult to sit. She has been experiencing intermittent inability to void and urinary retention that required an indwelling catheter until recently.    Susan recently experienced a seizure and fall that resulted in multiple rib fractures and a fracture of her 1st left metacarpal. She did not fall on her back at that time. She has had MRI and CT of her pelvis without evidence of injury.      Past Medical History:   Diagnosis Date    Anemia     Anxiety     Cold sore     Depressive disorder     Diabetes (H)     Type 2 DM/No Insulin     E. coli UTI 11/19/2022    Encephalopathy     Esophageal varices without bleeding (H)     grade I    History of blood transfusion     10/2019    History of seizure     diabetic seizure    Infertility management 11/05/2015    Insomnia     Liver cirrhosis secondary to CUETO (H) 05/28/2020    Migraine     Pleural effusion     Thrombocytopenia (H24)     Thyroid disease        Past Surgical History:   Procedure Laterality Date    APPENDECTOMY      1989    BENCH LIVER N/A 11/2/2022    Procedure: Bench liver;  Surgeon: Delbert Morgan MD;  Location: [facility] OR    COLONOSCOPY      1/2020 at [facility]     ENT SURGERY  1998    tempanoplasty    GI SURGERY      EGD August 2020 at [facility]     GYN SURGERY  2010    laparoscopic ablation    IR PARACENTESIS  6/8/2020    IR THORACENTESIS  6/6/2022    IR THORACENTESIS  1/11/2022    IR THORACENTESIS  12/2/2021    IR THORACENTESIS  11/29/2021    IR THORACENTESIS  11/23/2021    IR THORACENTESIS  11/12/2021    IR THORACENTESIS  11/4/2021    IR THORACENTESIS  11/1/2021    IR THORACENTESIS   10/29/2021    IR THORACENTESIS  10/28/2021    IR THORACENTESIS  10/27/2021    IR THORACENTESIS  10/24/2021    IR THORACENTESIS  10/22/2021    IR THORACENTESIS  10/5/2021    IR THORACENTESIS  2021    IR THORACENTESIS  2021    IR THORACENTESIS  2021    IR THORACENTESIS  2021    IR THORACENTESIS  2021    IR THORACENTESIS  2021    IR THORACENTESIS  8/10/2021    IR THORACENTESIS  2021    IR THORACENTESIS  2021    IR THORACENTESIS  2021    IR THORACENTESIS  2021    IR THORACENTESIS  2021    IR THORACENTESIS  3/31/2021    IR THORACENTESIS  2020    IR TRANSVEN INTRAHEPATIC PORTOSYST SHUNT  2021    MAMMOPLASTY REDUCTION BILATERAL      [place] STAB PHELBECTOMY VARICOSE VEINS, LESS THAN 10 INCISIONS, ONE EXTREMITY      RNY gastric bypass  2006    retoocolic, retrogastric, [facility]    TONSILLECTOMY & ADENOIDECTOMY Bilateral     TRANSPLANT LIVER RECIPIENT  DONOR N/A 2022    Procedure: TRANSPLANT, LIVER, RECIPIENT,  DONOR;  Surgeon: Delbert Morgan MD;  Location:  OR    WISDOM TEETH EXTRACTION         Social History     Socioeconomic History    Marital status:      Spouse name: Not on file    Number of children: Not on file    Years of education: Not on file    Highest education level: Bachelor's degree (e.g., BA, AB, BS)   Occupational History    Not on file   Tobacco Use    Smoking status: Never    Smokeless tobacco: Never   Vaping Use    Vaping Use: Never used   Substance and Sexual Activity    Alcohol use: Not Currently     Comment: Last drink was in 2017    Drug use: Never    Sexual activity: Not on file   Other Topics Concern    Parent/sibling w/ CABG, MI or angioplasty before 65F 55M? Not Asked   Social History Narrative    Not on file     Social Determinants of Health     Financial Resource Strain: Not on file   Food Insecurity: No Food Insecurity (2020)    Hunger Vital Sign     Worried About Running Out of  Food in the Last Year: Never true     Ran Out of Food in the Last Year: Never true   Transportation Needs: No Transportation Needs (11/5/2020)    PRAPARE - Transportation     Lack of Transportation (Medical): No     Lack of Transportation (Non-Medical): No   Physical Activity: Insufficiently Active (11/5/2020)    Exercise Vital Sign     Days of Exercise per Week: 1 day     Minutes of Exercise per Session: 10 min   Stress: Stress Concern Present (11/5/2020)    [employer] - Occupational Stress Questionnaire     Feeling of Stress : To some extent   Social Connections: Moderately Integrated (11/5/2020)    Social Connection and Isolation Panel [NHANES]     Frequency of Communication with Friends and Family: More than three times a week     Frequency of Social Gatherings with Friends and Family: Once a week     Attends Adventism Services: More than 4 times per year     Active Member of Clubs or Organizations: No     Attends Club or Organization Meetings: Never     Marital Status:    Interpersonal Safety: Not At Risk (5/26/2023)    Humiliation, Afraid, Rape, and Kick questionnaire     Fear of Current or Ex-Partner: No     Emotionally Abused: No     Physically Abused: No     Sexually Abused: No   Housing Stability: Low Risk  (11/5/2020)    Housing Stability Vital Sign     Unable to Pay for Housing in the Last Year: No     Number of Places Lived in the Last Year: 1     Unstable Housing in the Last Year: No       Family History   Problem Relation Age of Onset    Anesthesia Reaction Nephew         PONV    Bleeding Disorder No family hx of     Clotting Disorder No family hx of        ROS    Allergies   Allergen Reactions    Methylprednisolone Hives     Per patient, reaction occurred in 1999 or earlier. Broke out in round, flat hives in neck and chest area. No shortness of breath or swelling. Unknown if reaction occurred immediately after administration.     Adhesive Tape Itching    Oxycodone       slurring    Droperidol Anxiety    Nsaids Other (See Comments)     GI bleed.    Prednisone Anxiety, Hives and Rash     Per patient she has tolerated this since       Current Outpatient Medications   Medication    acetaminophen (TYLENOL) 500 MG tablet    calcium carbonate-vitamin D (OSCAL) 500-5 MG-MCG tablet    cyanocobalamin (VITAMIN B-12) 1000 MCG tablet    escitalopram (LEXAPRO) 20 MG tablet    folic acid (FOLVITE) 1 MG tablet    gabapentin (NEURONTIN) 100 MG capsule    levothyroxine (SYNTHROID/LEVOTHROID) 125 MCG tablet    melatonin 5 MG tablet    metoclopramide (REGLAN) 5 MG tablet    naratriptan (AMERGE) 2.5 MG tablet    ondansetron (ZOFRAN ODT) 8 MG ODT tab    sitagliptin (JANUVIA) 100 MG tablet    tacrolimus (GENERIC) 1 MG capsule    topiramate (TOPAMAX) 50 MG tablet    traMADol (ULTRAM) 50 MG tablet     No current facility-administered medications for this visit.       /79   Pulse 87  No LMP recorded. Patient is perimenopausal. There is no height or weight on file to calculate BMI.  Ms. Puckett is alert, uncomfortable sitting on left hip with non-labored breathing.   Abdomen is soft, non-tender, non-distended, no CVAT.    Normal external female genitalia. The urethra was normal appearing.    She has good support on supine strain.  Speculum and bimanual exam are remarkable for diffuse tenderness through out.      2/5 kegels.      A/P: Susan Puckett is a 53 year old F with myofascial palci floor dysfunction    Refer for PFPT.     I spent a total of 30 minutes with  Ms. Puckett  on the date of the encounter in chart review, face to face patient visit, review of tests, documentation and/or discussion with other providers about the issues documented above.    Raphael Wu MD  Professor, OB/GYN  Urogynecologist  CC  Patient Care Team:  Harleen Billy PA-C as PCP - General  Rivera Dowling MD as MD (Gastroenterology)  Raphael Cook MD as MD (Gastroenterology)  Steffany Yun PA-C as  Physician Assistant (Anesthesiology)  Sancho Gaines as Assigned Behavioral Health Provider  Davi Clayton RPH as Pharmacist (Pharmacist)  Davi Clayton RPH as Assigned [place] Pharmacist  Raphael Cook MD as MD (Gastroenterology)  Jeff Rivers MD as MD (Dermatology)  Cogan, Jacob, MD as Physician (Hematology & Oncology)  Cogan, Jacob, MD as Physician (Hematology & Oncology)  Raphael Cook MD as Assigned Surgical Provider  Cogan, Jacob, MD as Assigned Cancer Care Provider  Keke Dang RN as Specialty Care Coordinator (Hematology & Oncology)  Raphael Wu MD as MD (OB/Gyn)  SELF, REFERRED

## 2024-03-29 NOTE — LETTER
3/29/2024       RE: Susan Puckett  [address] [facility] [address]     Dear Colleague,    Thank you for referring your patient, Susan Puckett, to the [facility] [place] at [facility]. Please see a copy of my visit note below.    March 29, 2024    Referring Provider: Referred Self, MD  No address on file    Primary Care Provider: Harleen Billy    CC: pelvic floor pain    HPI:  Susan Puckett is a 53 year old female who presents for evaluation of her pelvic floor symptoms. Susan reports she has been experiencing right sided pelvic floor pain since a stroke in early Feb. Her pain is 10/10, make it difficult to sit. She has been experiencing intermittent inability to void and urinary retention that required an indwelling catheter until recently.    Susan recently experienced a seizure and fall that resulted in multiple rib fractures and a fracture of her 1st left metacarpal. She did not fall on her back at that time. She has had MRI and CT of her pelvis without evidence of injury.      Past Medical History:   Diagnosis Date    Anemia     Anxiety     Cold sore     Depressive disorder     Diabetes (H)     Type 2 DM/No Insulin     E. coli UTI 11/19/2022    Encephalopathy     Esophageal varices without bleeding (H)     grade I    History of blood transfusion     10/2019    History of seizure     diabetic seizure    Infertility management 11/05/2015    Insomnia     Liver cirrhosis secondary to CUETO (H) 05/28/2020    Migraine     Pleural effusion     Thrombocytopenia (H24)     Thyroid disease        Past Surgical History:   Procedure Laterality Date    APPENDECTOMY      1989    BENCH LIVER N/A 11/2/2022    Procedure: Bench liver;  Surgeon: Delbert Morgan MD;  Location: [facility] OR    COLONOSCOPY      1/2020 at [facility]     ENT SURGERY  1998    tempanoplasty    GI SURGERY      EGD August 2020 at [facility]     GYN SURGERY  2010    laparoscopic  ablation    IR PARACENTESIS  2020    IR THORACENTESIS  2022    IR THORACENTESIS  2022    IR THORACENTESIS  2021    IR THORACENTESIS  2021    IR THORACENTESIS  2021    IR THORACENTESIS  2021    IR THORACENTESIS  2021    IR THORACENTESIS  2021    IR THORACENTESIS  10/29/2021    IR THORACENTESIS  10/28/2021    IR THORACENTESIS  10/27/2021    IR THORACENTESIS  10/24/2021    IR THORACENTESIS  10/22/2021    IR THORACENTESIS  10/5/2021    IR THORACENTESIS  2021    IR THORACENTESIS  2021    IR THORACENTESIS  2021    IR THORACENTESIS  2021    IR THORACENTESIS  2021    IR THORACENTESIS  2021    IR THORACENTESIS  8/10/2021    IR THORACENTESIS  2021    IR THORACENTESIS  2021    IR THORACENTESIS  2021    IR THORACENTESIS  2021    IR THORACENTESIS  2021    IR THORACENTESIS  3/31/2021    IR THORACENTESIS  2020    IR TRANSVEN INTRAHEPATIC PORTOSYST SHUNT  2021    MAMMOPLASTY REDUCTION BILATERAL      [place] STAB PHELBECTOMY VARICOSE VEINS, LESS THAN 10 INCISIONS, ONE EXTREMITY      RNY gastric bypass  2006    retoocolic, retrogastric, [facility]    TONSILLECTOMY & ADENOIDECTOMY Bilateral     TRANSPLANT LIVER RECIPIENT  DONOR N/A 2022    Procedure: TRANSPLANT, LIVER, RECIPIENT,  DONOR;  Surgeon: Delbert Morgan MD;  [facility]         Social History     Socioeconomic History    Marital status:      Spouse name: Not on file    Number of children: Not on file    Years of education: Not on file    Highest education level: Bachelor's degree (e.g., BA, AB, BS)   Occupational History    Not on file   Tobacco Use    Smoking status: Never    Smokeless tobacco: Never   Vaping Use    Vaping Use: Never used   Substance and Sexual Activity    Alcohol use: Not Currently     Comment: Last drink was in 2017    Drug use: Never    Sexual activity: Not on file   Other  Topics Concern    Parent/sibling w/ CABG, MI or angioplasty before 65F 55M? Not Asked   Social History Narrative    Not on file     Social Determinants of Health     Financial Resource Strain: Not on file   Food Insecurity: No Food Insecurity (11/5/2020)    Hunger Vital Sign     Worried About Running Out of Food in the Last Year: Never true     Ran Out of Food in the Last Year: Never true   Transportation Needs: No Transportation Needs (11/5/2020)    PRAPARE - Transportation     Lack of Transportation (Medical): No     Lack of Transportation (Non-Medical): No   Physical Activity: Insufficiently Active (11/5/2020)    Exercise Vital Sign     Days of Exercise per Week: 1 day     Minutes of Exercise per Session: 10 min   Stress: Stress Concern Present (11/5/2020)    [employer] [employer] - Occupational Stress Questionnaire     Feeling of Stress : To some extent   Social Connections: Moderately Integrated (11/5/2020)    Social Connection and Isolation Panel [NHANES]     Frequency of Communication with Friends and Family: More than three times a week     Frequency of Social Gatherings with Friends and Family: Once a week     Attends Mandaeism Services: More than 4 times per year     Active Member of Clubs or Organizations: No     Attends Club or Organization Meetings: Never     Marital Status:    Interpersonal Safety: Not At Risk (5/26/2023)    Humiliation, Afraid, Rape, and Kick questionnaire     Fear of Current or Ex-Partner: No     Emotionally Abused: No     Physically Abused: No     Sexually Abused: No   Housing Stability: Low Risk  (11/5/2020)    Housing Stability Vital Sign     Unable to Pay for Housing in the Last Year: No     Number of Places Lived in the Last Year: 1     Unstable Housing in the Last Year: No       Family History   Problem Relation Age of Onset    Anesthesia Reaction Nephew         PONV    Bleeding Disorder No family hx of     Clotting Disorder No family hx of         ROS    Allergies   Allergen Reactions    Methylprednisolone Hives     Per patient, reaction occurred in 1999 or earlier. Broke out in round, flat hives in neck and chest area. No shortness of breath or swelling. Unknown if reaction occurred immediately after administration.     Adhesive Tape Itching    Oxycodone      slurring    Droperidol Anxiety    Nsaids Other (See Comments)     GI bleed.    Prednisone Anxiety, Hives and Rash     Per patient she has tolerated this since       Current Outpatient Medications   Medication    acetaminophen (TYLENOL) 500 MG tablet    calcium carbonate-vitamin D (OSCAL) 500-5 MG-MCG tablet    cyanocobalamin (VITAMIN B-12) 1000 MCG tablet    escitalopram (LEXAPRO) 20 MG tablet    folic acid (FOLVITE) 1 MG tablet    gabapentin (NEURONTIN) 100 MG capsule    levothyroxine (SYNTHROID/LEVOTHROID) 125 MCG tablet    melatonin 5 MG tablet    metoclopramide (REGLAN) 5 MG tablet    naratriptan (AMERGE) 2.5 MG tablet    ondansetron (ZOFRAN ODT) 8 MG ODT tab    sitagliptin (JANUVIA) 100 MG tablet    tacrolimus (GENERIC) 1 MG capsule    topiramate (TOPAMAX) 50 MG tablet    traMADol (ULTRAM) 50 MG tablet     No current facility-administered medications for this visit.       /79   Pulse 87  No LMP recorded. Patient is perimenopausal. There is no height or weight on file to calculate BMI.  Ms. Puckett is alert, uncomfortable sitting on left hip with non-labored breathing.   Abdomen is soft, non-tender, non-distended, no CVAT.    Normal external female genitalia. The urethra was normal appearing.    She has good support on supine strain.  Speculum and bimanual exam are remarkable for diffuse tenderness through out.      2/5 kegels.      A/P: Susan Puckett is a 53 year old F with myofascial palci floor dysfunction    Refer for PFPT.     I spent a total of 30 minutes with  Ms. Puckett  on the date of the encounter in chart review, face to face patient visit, review of tests, documentation  and/or discussion with other providers about the issues documented above.    Raphael Wu MD  Professor, OB/GYN  Urogynecologist  CC  Patient Care Team:  Harleen Billy PA-C as PCP - General  Rivera Dowling MD as MD (Gastroenterology)  Raphael Cook MD as MD (Gastroenterology)  Steffany Yun PA-C as Physician Assistant (Anesthesiology)  Sancho Gaines as Assigned Behavioral Health Provider  Davi Clayton RPH as Pharmacist (Pharmacist)  Davi Clayton RPH as Assigned [facility] Pharmacist  Raphael Cook MD as MD (Gastroenterology)  Jeff Rivers MD as MD (Dermatology)  Cogan, Jacob, MD as Physician (Hematology & Oncology)  Cogan, Jacob, MD as Physician (Hematology & Oncology)

## 2024-03-29 NOTE — NURSING NOTE
Chief Complaint   Patient presents with    Consult     Pelvic floor dysfunction, incomplete bladder emptying       Blood pressure 118/79, pulse 87, not currently breastfeeding. There is no height or weight on file to calculate BMI.    Patient Active Problem List   Diagnosis    Anxiety    Ascites    Benign tumor of colon    Brow ptosis, bilateral    Chronic diarrhea    Chronic peritoneal effusion    Colitis    Endometriosis    Hepatic encephalopathy (H)    History of gastric bypass    Insomnia    Hypothyroidism    HSV-1 (herpes simplex virus 1) infection    Iron deficiency anemia    Raynaud phenomenon    Ptosis of eyelid    Pleural effusion associated with hepatic disorder    Thrombocytopenia (H24)    Other headache syndrome    Nausea    Major depressive disorder, recurrent episode, moderate (H)    Infertility management    History of gastric ulcer    Allergic rhinitis    Tubular adenoma of colon    Vitamin D deficiency    Visual field defect    Type 2 diabetes mellitus without complication, without long-term current use of insulin (H)    Primary hypothyroidism    H/O gastric bypass    Exposure to COVID-19 virus    Abdominal pain, epigastric    Steroid-induced hyperglycemia    Hypomagnesemia    Prolonged Q-T interval on ECG    Liver transplanted (H)    Acute kidney failure with tubular necrosis (H24)    Hypervolemia    Immunosuppressed status (H24)    Anemia in other chronic diseases classified elsewhere    Hyponatremia    Hypoalbuminemia    GEORGETTE (acute kidney injury) (H24)    Acute post-operative pain    Seizure due to hypoglycemia (H)    Migraine    Moderate malnutrition (H24)    Pancreatic insufficiency    Constipation    Hypophosphatemia    Generalized weakness    Status post liver transplantation (H)    Bilateral leg pain    Acute cystitis without hematuria    Bilateral lower extremity edema    Urinary tract infection    Generalized abdominal pain    Vomiting and diarrhea    Leukopenia    RUQ abdominal pain     Drug-induced neutropenia (H24)    Iron deficiency anemia due to chronic blood loss    E. coli UTI    Urinary retention    Pelvic pain in female    Abdominal pain, unspecified abdominal location    Constipation, unspecified constipation type       Allergies   Allergen Reactions    Methylprednisolone Hives     Per patient, reaction occurred in 1999 or earlier. Broke out in round, flat hives in neck and chest area. No shortness of breath or swelling. Unknown if reaction occurred immediately after administration.     Adhesive Tape Itching    Oxycodone      slurring    Droperidol Anxiety    Nsaids Other (See Comments)     GI bleed.    Prednisone Anxiety, Hives and Rash     Per patient she has tolerated this since       Current Outpatient Medications   Medication Sig Dispense Refill    acetaminophen (TYLENOL) 500 MG tablet Take 1 tablet (500 mg) by mouth 4 times daily (before meals and nightly) 60 tablet 0    calcium carbonate-vitamin D (OSCAL) 500-5 MG-MCG tablet Take 1 tablet by mouth 2 times daily      cyanocobalamin (VITAMIN B-12) 1000 MCG tablet Take 1,000 mcg by mouth every 7 days on Wednesdays      escitalopram (LEXAPRO) 20 MG tablet Take 30 mg by mouth daily      folic acid (FOLVITE) 1 MG tablet Take 1 mg by mouth every morning      gabapentin (NEURONTIN) 100 MG capsule Take 3 capsules (300 mg) by mouth 3 times daily for 30 days 270 capsule 0    levothyroxine (SYNTHROID/LEVOTHROID) 125 MCG tablet Take 125 mcg by mouth daily      melatonin 5 MG tablet Take 5 mg by mouth At Bedtime      metoclopramide (REGLAN) 5 MG tablet Take 1 tablet (5 mg) by mouth 3 times daily as needed 100 tablet 3    naratriptan (AMERGE) 2.5 MG tablet Take 2.5 mg by mouth at onset of headache for migraine      ondansetron (ZOFRAN ODT) 8 MG ODT tab Take 1 tablet (8 mg) by mouth every 8 hours as needed for nausea 30 tablet 0    sitagliptin (JANUVIA) 100 MG tablet Take 100 mg by mouth daily      tacrolimus (GENERIC) 1 MG capsule Take 2 capsules  (2 mg) by mouth every 12 hours 540 capsule 3    topiramate (TOPAMAX) 50 MG tablet Take 100 mg by mouth at bedtime      traMADol (ULTRAM) 50 MG tablet Take 1 tablet (50 mg) by mouth every 6 hours as needed for severe pain or moderate pain 30 tablet 0       Social History     Tobacco Use    Smoking status: Never    Smokeless tobacco: Never   Vaping Use    Vaping Use: Never used   Substance Use Topics    Alcohol use: Not Currently     Comment: Last drink was in 2017    Drug use: Never       Jaiden Gu  3/29/2024  1:53 PM

## 2024-04-17 ENCOUNTER — THERAPY VISIT (OUTPATIENT)
Dept: PHYSICAL THERAPY | Facility: CLINIC | Age: 54
End: 2024-04-17
Attending: INTERNAL MEDICINE
Payer: COMMERCIAL

## 2024-04-17 DIAGNOSIS — R33.9 URINARY RETENTION: ICD-10-CM

## 2024-04-17 DIAGNOSIS — R10.2 PELVIC PAIN IN FEMALE: ICD-10-CM

## 2024-04-17 DIAGNOSIS — M99.05 SOMATIC DYSFUNCTION OF PELVIC REGION: Primary | ICD-10-CM

## 2024-04-17 PROCEDURE — 97110 THERAPEUTIC EXERCISES: CPT | Mod: GP | Performed by: PHYSICAL THERAPIST

## 2024-04-17 PROCEDURE — 97161 PT EVAL LOW COMPLEX 20 MIN: CPT | Mod: GP | Performed by: PHYSICAL THERAPIST

## 2024-04-17 PROCEDURE — 97112 NEUROMUSCULAR REEDUCATION: CPT | Mod: GP | Performed by: PHYSICAL THERAPIST

## 2024-04-17 NOTE — PROGRESS NOTES
PHYSICAL THERAPY EVALUATION  Type of Visit: Evaluation    See electronic medical record for Abuse and Falls Screening details.    Subjective       Presenting condition or subjective complaint: I had a seizure/ brain bleed fall in early February and was hospitalized 4 times. I had a catheter for several weeks unable to void. After seizure i had a fall. Excessive pain in right vaginal area rightbutt cheeks/spot where thigh and rectum  Date of onset: 24    Relevant medical history: Bladder or bowel problems; Depression; Diabetes; History of fractures; Mental Illness; Migraines or headaches; Ongoing fever or chills; Overweight; Pain at night or rest; Seizures; Severe headaches; Significant weakness; Thyroid problems   Dates & types of surgery: tonsilsadnoids .  appendix   tymplanoplasty  endometriosis 2016? breast reduction   gastric bypass   vein [id number] liver transplant  tips procedure     Prior diagnostic imaging/testing results: MRI; CT scan     Prior therapy history for the same diagnosis, illness or injury: No      Prior Level of Function  Transfers: Independent  Ambulation: Independent  ADL: Independent  IADL:     Living Environment  Social support: With a significant other or spouse   Type of home: House; Basement   Stairs to enter the home: Yes 3 Is there a railing: Yes   Ramp: No   Stairs inside the home: Yes 15 Is there a railing: Yes   Help at home: None  Equipment owned: Straight Cane; Bath bench     Employment: No    Hobbies/Interests: walks  family friend time  time with dog being outside [employer] [place] baking    Patient goals for therapy: ways to work on pain management and to figure out what type of pelvic floor dysfunction i do have i want to be able to discuss more    Pain assessment: Location: right vaginal pain/Ratin/10     Objective      PELVIC EVALUATION  ADDITIONAL HISTORY:  Sex assigned at birth: Female  Gender identity: Female    Pronouns:  She/Her Hers      Bladder History:  Feels bladder filling: Yes  Triggers for feeling of inability to wait to go to the bathroom: Yes feels like immediate need to go.  pressure in bladder pain  How long can you wait to urinate: 15 minutes  Gets up at night to urinate: Yes 1/2  Can stop the flow of urine when urinating: Yes  Volume of urine usually released: Medium   Other issues: Trouble emptying bladder completely; Dribbling after urinating  Number of bladder infections in last 12 months:    Fluid intake per day: 24 0unces 16    Medications taken for bladder: Yes flomax  peridium   Activities causing urine leak:      Amount of urine typically leaked:    Pads used to help with leaking: No        Bowel History:  Frequency of bowel movement: 2/3  Consistency of stool: Other dirrearia  Ignores the urge to defecate: No  Other bowel issues: Pain when pooping; Loss of stool; Loss of gas; Straining to have bowel movement  Length of time spent trying to have a bowel movement: 5 minutes    Sexual Function History:  Sexual orientation: Straight    Sexually active: No  Lubrication used: No No  Pelvic pain: Walking; Standing; Sitting; Certain positions; Initial penetration (rectal or vaginal); Deep penetration (rectal or vaginal); Pelvic exams; Rectal exams have always had pain during sex  have to cross my legs tight when sitting  i have pain durung exams  went thru infertility ivf too unknown reasons why  never been pregnant  Pain or difficulty with orgasms/erection/ejaculation: Yes never shared about pain during sex not sure hiw to explain the pain  State of menopause:    Hormone medications: No      Are you currently pregnant: No, Number of previous pregnancies: 0, Number of deliveries: 0, Have you been diagnosed with pelvic prolapse or abdominal separation: No, Do you get regular exercise: No, Have you tried pelvic floor strengthening exercises for 4 weeks: No, Do you have any history of trauma that is relevant to your care  that you d like to share: No    Discussed reason for referral regarding pelvic health needs and external/internal pelvic floor muscle examination with patient/guardian.  Opportunity provided to ask questions and verbal consent for assessment and intervention was given.    PAIN: Pain Level at Rest: 8/10  Pain Location: Pelvic R>L  Pain Quality: Aching, Gnawing, Nagging, and Scratchy  Pain is Exacerbated By: Sitting; movement; penetration  POSTURE:   LUMBAR SCREEN:   HIP SCREEN:  Strength:   Pain: - none + mild ++ moderate +++ severe  Strength Scale: 0-5/5 Left Right   Hip Flexion     Hip Extension 4 4   Hip Abduction 4 4   Hip Adduction     Hip Internal Rotation     Hip External Rotation     Knee Flexion     Knee Extension        Functional Strength Testing:     PELVIC/SI SCREEN:     PAIN PROVOCATION TEST:   PELVIS/SI SPECIAL TESTS:   BREATHING SYMMETRY:     PELVIC EXAM  External Visual Inspection:  At rest: Normal  With voluntary pelvic floor contraction: Perineal elevation  Relaxation of PFM: Partial/delayed relaxation    Integumentary:   Introitus: Atrophy    External Digital Palpation per Perineum:   Ischiocavernosis: Tightness, Tenderness, right side  Bulbo cavernosis: Tightness, Tenderness, right side  Transverse perineal: Tightness, Tenderness, right side  Levator ani: Tightness, Tenderness, right side    Scar:   Location/Type:   Mobility:     Internal Digital Palpation:  Per Vagina:  Tenderness  Myofascial Resistance to Palpation: Firm  Digital Muscle Performance: P (Power): 3/5  Compensations: Adductors, Gluts, Breath holding  Relaxation Post-Contraction: Partial/delayed relaxation    Per Rectum:        Pelvic Organ Prolapse:       ABDOMINAL ASSESSMENT  Diastasis Rectus Abdominis (LUCIAN):      Abdominal Activation/Strength:     Scar:   Location/Type:   Mobility:     Fascial Tension/Restriction:     BIOFEEDBACK:  Position: Supine  Surface Electrodes: Perineal    Abdominals:     Perianals:   Baseline muscle  activity: 2.7 microvolts  Endurance Hold-Baseline between contractions: 7.3uV/3.5uV/W-R+3.79  Quick Flicks-Baseline between contractions: 12.3uV/5.7uV/W-R+6.52    DERMATOMES:   DTR S:     Assessment & Plan   CLINICAL IMPRESSIONS  Medical Diagnosis: Urinary retention    Treatment Diagnosis: Pelvic dysfunction   Impression/Assessment: Patient is a 53 year old female with urinary retention complaints.  The following significant findings have been identified: Pain, Decreased ROM/flexibility, Decreased strength, Impaired muscle performance, and Decreased activity tolerance. These impairments interfere with their ability to perform self care tasks and recreational activities as compared to previous level of function.     Clinical Decision Making (Complexity):  Clinical Presentation: Stable/Uncomplicated  Clinical Presentation Rationale: based on medical and personal factors listed in PT evaluation  Clinical Decision Making (Complexity): Low complexity    PLAN OF CARE  Treatment Interventions:  Interventions: Manual Therapy, Neuromuscular Re-education, Therapeutic Activity, Therapeutic Exercise    Long Term Goals     PT Goal 1  Goal Identifier: Urinary retention  Goal Description: Able to fully empty with voiding and not dribble after  Rationale: to maximize safety and independence with performance of ADLs and functional tasks  Goal Progress: dribbling after every void  Target Date: 07/10/24  PT Goal 2  Goal Identifier: Vaginal exam  Goal Description: Able to tolerate a vaginal exam with 0/10 PL  Rationale: to maximize safety and independence with performance of ADLs and functional tasks  Goal Progress: 8/10 PL with pelvic exam  Target Date: 07/10/24      Frequency of Treatment: 1x/week  Duration of Treatment: 12 weeks    Recommended Referrals to Other Professionals:   Education Assessment:        Risks and benefits of evaluation/treatment have been explained.   Patient/Family/caregiver agrees with Plan of Care.      Evaluation Time:     PT Eval, Low Complexity Minutes (73527): 40       Signing Clinician: YAHAIRA Briggs [facility]                                                                                   OUTPATIENT PHYSICAL THERAPY      PLAN OF TREATMENT FOR OUTPATIENT REHABILITATION   Patient's Last Name, First Name, Susan Navarrete YOB: 1970   Provider's Name   [facility]   Medical Record No.  [id number]     Onset Date: 02/09/24  Start of Care Date: 04/17/24     Medical Diagnosis:  Urinary retention      PT Treatment Diagnosis:  Pelvic dysfunction Plan of Treatment  Frequency/Duration: 1x/week/ 12 weeks    Certification date from 04/17/24 to 07/10/24         See note for plan of treatment details and functional goals     Shelby Smith PT                         I CERTIFY THE NEED FOR THESE SERVICES FURNISHED UNDER        THIS PLAN OF TREATMENT AND WHILE UNDER MY CARE     (Physician attestation of this document indicates review and certification of the therapy plan).              Referring Provider:  Shyam Guerra    Initial Assessment  See Epic Evaluation- Start of Care Date: 04/17/24

## 2024-04-24 ENCOUNTER — THERAPY VISIT (OUTPATIENT)
Dept: PHYSICAL THERAPY | Facility: CLINIC | Age: 54
End: 2024-04-24
Attending: INTERNAL MEDICINE
Payer: COMMERCIAL

## 2024-04-24 DIAGNOSIS — M99.05 SOMATIC DYSFUNCTION OF PELVIC REGION: ICD-10-CM

## 2024-04-24 DIAGNOSIS — R33.9 URINARY RETENTION: Primary | ICD-10-CM

## 2024-04-24 PROCEDURE — 97140 MANUAL THERAPY 1/> REGIONS: CPT | Mod: GP | Performed by: PHYSICAL THERAPIST

## 2024-04-24 PROCEDURE — 97110 THERAPEUTIC EXERCISES: CPT | Mod: GP | Performed by: PHYSICAL THERAPIST

## 2024-04-29 ENCOUNTER — MYC MEDICAL ADVICE (OUTPATIENT)
Dept: UROLOGY | Facility: CLINIC | Age: 54
End: 2024-04-29
Payer: COMMERCIAL

## 2024-04-29 DIAGNOSIS — R10.2 PELVIC PAIN IN FEMALE: Primary | ICD-10-CM

## 2024-04-29 DIAGNOSIS — M79.18 MYOFASCIAL PAIN SYNDROME: Primary | ICD-10-CM

## 2024-04-29 RX ORDER — DIAZEPAM 5 MG
5 TABLET ORAL
Qty: 30 TABLET | Refills: 0 | Status: SHIPPED | OUTPATIENT
Start: 2024-04-29 | End: 2024-05-29

## 2024-05-01 ENCOUNTER — LAB (OUTPATIENT)
Dept: LAB | Facility: CLINIC | Age: 54
End: 2024-05-01
Payer: COMMERCIAL

## 2024-05-01 ENCOUNTER — TELEPHONE (OUTPATIENT)
Dept: UROLOGY | Facility: CLINIC | Age: 54
End: 2024-05-01

## 2024-05-01 DIAGNOSIS — R10.2 PELVIC PAIN IN FEMALE: ICD-10-CM

## 2024-05-01 DIAGNOSIS — Z94.4 LIVER REPLACED BY TRANSPLANT (H): ICD-10-CM

## 2024-05-01 LAB
ALBUMIN SERPL BCG-MCNC: 2.9 G/DL (ref 3.5–5.2)
ALP SERPL-CCNC: 108 U/L (ref 40–150)
ALT SERPL W P-5'-P-CCNC: 22 U/L (ref 0–50)
ANION GAP SERPL CALCULATED.3IONS-SCNC: 7 MMOL/L (ref 7–15)
AST SERPL W P-5'-P-CCNC: 36 U/L (ref 0–45)
BILIRUB DIRECT SERPL-MCNC: <0.2 MG/DL (ref 0–0.3)
BILIRUB SERPL-MCNC: 0.2 MG/DL
BUN SERPL-MCNC: 9.2 MG/DL (ref 6–20)
CALCIUM SERPL-MCNC: 6.9 MG/DL (ref 8.6–10)
CHLORIDE SERPL-SCNC: 107 MMOL/L (ref 98–107)
CREAT SERPL-MCNC: 1.01 MG/DL (ref 0.51–0.95)
DEPRECATED HCO3 PLAS-SCNC: 26 MMOL/L (ref 22–29)
EGFRCR SERPLBLD CKD-EPI 2021: 67 ML/MIN/1.73M2
ERYTHROCYTE [DISTWIDTH] IN BLOOD BY AUTOMATED COUNT: 13.2 % (ref 10–15)
GLUCOSE SERPL-MCNC: 228 MG/DL (ref 70–99)
HCT VFR BLD AUTO: 34.6 % (ref 35–47)
HGB BLD-MCNC: 11.1 G/DL (ref 11.7–15.7)
MAGNESIUM SERPL-MCNC: 1.4 MG/DL (ref 1.7–2.3)
MCH RBC QN AUTO: 31.4 PG (ref 26.5–33)
MCHC RBC AUTO-ENTMCNC: 32.1 G/DL (ref 31.5–36.5)
MCV RBC AUTO: 98 FL (ref 78–100)
PHOSPHATE SERPL-MCNC: 3.5 MG/DL (ref 2.5–4.5)
PLATELET # BLD AUTO: 187 10E3/UL (ref 150–450)
POTASSIUM SERPL-SCNC: 4.2 MMOL/L (ref 3.4–5.3)
PROT SERPL-MCNC: 5.5 G/DL (ref 6.4–8.3)
RBC # BLD AUTO: 3.54 10E6/UL (ref 3.8–5.2)
SODIUM SERPL-SCNC: 140 MMOL/L (ref 135–145)
TACROLIMUS BLD-MCNC: 3.7 UG/L (ref 5–15)
TME LAST DOSE: ABNORMAL H
TME LAST DOSE: ABNORMAL H
WBC # BLD AUTO: 5.1 10E3/UL (ref 4–11)

## 2024-05-01 PROCEDURE — 84100 ASSAY OF PHOSPHORUS: CPT

## 2024-05-01 PROCEDURE — 80197 ASSAY OF TACROLIMUS: CPT

## 2024-05-01 PROCEDURE — 36415 COLL VENOUS BLD VENIPUNCTURE: CPT

## 2024-05-01 PROCEDURE — 82248 BILIRUBIN DIRECT: CPT

## 2024-05-01 PROCEDURE — 80053 COMPREHEN METABOLIC PANEL: CPT

## 2024-05-01 PROCEDURE — 83735 ASSAY OF MAGNESIUM: CPT

## 2024-05-01 PROCEDURE — 85027 COMPLETE CBC AUTOMATED: CPT

## 2024-05-01 RX ORDER — MAGNESIUM GLYCINATE 100 MG
200 CAPSULE ORAL 2 TIMES DAILY
COMMUNITY

## 2024-05-01 NOTE — TELEPHONE ENCOUNTER
[facility] Call Center    Phone Message    May a detailed message be left on voicemail: yes     Reason for Call: Other: Izabella said they don't  do compound for  COMPOUND CONTAINING CONTROLLED SUBSTANCE (CMPD RX) - PHARMACY TO MIX COMPOUNDED MEDICATION. Please call Izabella back.    Action Taken: Message routed to:  [facility] ([facility]): Urology    Travel Screening: Not Applicable

## 2024-05-01 NOTE — TELEPHONE ENCOUNTER
[facility] at [address] [place] [address] offers compounding services, as does [facility]. Pended to [facility] pharmacy.

## 2024-05-01 NOTE — TELEPHONE ENCOUNTER
[facility] just switched to a non-compounding pharmacy a couple of months ago, so they are unable to fill this medication. Pharmacy staff recommended we send the prescription to the [facility] in [place], and they may be able to securely deliver it to pt.

## 2024-05-26 ENCOUNTER — HEALTH MAINTENANCE LETTER (OUTPATIENT)
Age: 54
End: 2024-05-26

## 2024-05-29 ENCOUNTER — ANCILLARY PROCEDURE (OUTPATIENT)
Dept: BONE DENSITY | Facility: CLINIC | Age: 54
End: 2024-05-29
Attending: INTERNAL MEDICINE
Payer: COMMERCIAL

## 2024-05-29 DIAGNOSIS — M85.88 OTHER SPECIFIED DISORDERS OF BONE DENSITY AND STRUCTURE, OTHER SITE: ICD-10-CM

## 2024-05-29 DIAGNOSIS — M85.80 OSTEOPENIA, UNSPECIFIED LOCATION: ICD-10-CM

## 2024-05-29 PROCEDURE — 77080 DXA BONE DENSITY AXIAL: CPT | Performed by: INTERNAL MEDICINE

## 2024-06-07 PROBLEM — R33.9 URINARY RETENTION: Status: RESOLVED | Noted: 2024-03-02 | Resolved: 2024-06-07

## 2024-06-07 PROBLEM — M99.05 SOMATIC DYSFUNCTION OF PELVIC REGION: Status: RESOLVED | Noted: 2024-04-17 | Resolved: 2024-06-07

## 2024-06-08 NOTE — PROGRESS NOTES
04/24/24 0500   Appointment Info   Signing clinician's name / credentials Shelby Smith DPT   Total/Authorized Visits 12   Visits Used 2   Medical Diagnosis Urinary retention   PT Tx Diagnosis Pelvic dysfunction   Quick Adds Certification   Progress Note/Certification   Start of Care Date 04/17/24   Onset of illness/injury or Date of Surgery 02/09/24   Therapy Frequency 1x/week   Predicted Duration 12 weeks   Certification date from 04/17/24   Certification date to 07/10/24   GOALS   PT Goals 2   PT Goal 1   Goal Identifier Urinary retention   Goal Description Able to fully empty with voiding and not dribble after for 1 week   Rationale to maximize safety and independence with performance of ADLs and functional tasks   Goal Progress On average1-2x/day   Target Date 07/10/24   PT Goal 2   Goal Identifier Vaginal exam   Goal Description Able to tolerate a vaginal exam with 0/10 PL   Rationale to maximize safety and independence with performance of ADLs and functional tasks   Goal Progress 8/10 PL with pelvic exam   Target Date 07/10/24   Subjective Report   Subjective Report Patient reports she is working on HEP. Is planning to reach out to MD to discuss vaginal valium.  Was able to purchase therawand and brought it to [employer] appt.   Objective Measures   Objective Measures Objective Measure 1   Objective Measure 1   Objective Measure PFM   Details Internal Moderate-Severe tone with tenderness 3-9 oclock with it more present 6-9 oclock   Treatment Interventions (PT)   Interventions Therapeutic Procedure/Exercise;Therapeutic Activity;Neuromuscular Re-education;Manual Therapy   Therapeutic Procedure/Exercise   Therapeutic Procedures: strength, endurance, ROM, flexibility minutes (53725) 10   PTRx Ther Proc 1 Supine Butterfly   PTRx Ther Proc 1 - Details 5x\   PTRx Ther Proc 2 Pretzel Stretch   PTRx Ther Proc 2 - Details 5x   PTRx Ther Proc 3 Piriformis Stretch Above 90 Degress Supine   PTRx Ther Proc 3 - Details 5x    Therapeutic Activity   PTRx Ther Act 1 Diaphragmatic Breathing   Neuromuscular Re-education   PTRx Neuro Re-ed 1 Bridging Pelvic Floor    Manual Therapy   Manual Therapy: Mobilization, MFR, MLD, friction massage minutes (38904) 20   Manual Therapy 1 Internal PFM musculature   Manual Therapy 1 - Details Internal OI/LA musculature with focus on the right side; demonstration of PFM wand to complete for 5-10' every other day   Patient Response/Progress painful; tolerated treatment with decreased soreness   Education   Learner/Method Patient   Education Comments discussed how to order therawand and bring to next appt   Plan   Home program PTRX   Total Session Time   Timed Code Treatment Minutes 30   Total Treatment Time (sum of timed and untimed services) 30         DISCHARGE  Reason for Discharge: Patient chooses to discontinue therapy.    Equipment Issued:     Discharge Plan: Continue with HEP    Referring Provider:  Shyam Guerra

## 2024-06-13 ENCOUNTER — MYC MEDICAL ADVICE (OUTPATIENT)
Dept: GASTROENTEROLOGY | Facility: CLINIC | Age: 54
End: 2024-06-13
Payer: COMMERCIAL

## 2024-06-13 DIAGNOSIS — Z94.4 LIVER REPLACED BY TRANSPLANT (H): Primary | ICD-10-CM

## 2024-06-13 DIAGNOSIS — M81.8 OTHER OSTEOPOROSIS, UNSPECIFIED PATHOLOGICAL FRACTURE PRESENCE: ICD-10-CM

## 2024-06-13 RX ORDER — ALENDRONATE SODIUM 70 MG/1
70 TABLET ORAL
Qty: 13 TABLET | Refills: 3 | Status: SHIPPED | OUTPATIENT
Start: 2024-06-13

## 2024-06-13 NOTE — TELEPHONE ENCOUNTER
Susan replied to [employer] message sent after her dexa scan with desired pharmacy to sent fosamax prescription to. Fosamax ordered.

## 2024-06-18 ENCOUNTER — LAB (OUTPATIENT)
Dept: LAB | Facility: CLINIC | Age: 54
End: 2024-06-18
Attending: INTERNAL MEDICINE
Payer: COMMERCIAL

## 2024-06-18 ENCOUNTER — OFFICE VISIT (OUTPATIENT)
Dept: GASTROENTEROLOGY | Facility: CLINIC | Age: 54
End: 2024-06-18
Attending: INTERNAL MEDICINE
Payer: COMMERCIAL

## 2024-06-18 VITALS
WEIGHT: 151 LBS | HEART RATE: 50 BPM | DIASTOLIC BLOOD PRESSURE: 86 MMHG | BODY MASS INDEX: 24.27 KG/M2 | HEIGHT: 66 IN | SYSTOLIC BLOOD PRESSURE: 143 MMHG

## 2024-06-18 DIAGNOSIS — Z94.4 LIVER REPLACED BY TRANSPLANT (H): ICD-10-CM

## 2024-06-18 DIAGNOSIS — Z94.4 LIVER REPLACED BY TRANSPLANT (H): Primary | ICD-10-CM

## 2024-06-18 DIAGNOSIS — Z94.4 LIVER TRANSPLANTED (H): ICD-10-CM

## 2024-06-18 DIAGNOSIS — M81.8 OTHER OSTEOPOROSIS, UNSPECIFIED PATHOLOGICAL FRACTURE PRESENCE: ICD-10-CM

## 2024-06-18 LAB
ALBUMIN SERPL BCG-MCNC: 2.9 G/DL (ref 3.5–5.2)
ALP SERPL-CCNC: 97 U/L (ref 40–150)
ALT SERPL W P-5'-P-CCNC: <5 U/L (ref 0–50)
ANION GAP SERPL CALCULATED.3IONS-SCNC: 5 MMOL/L (ref 7–15)
AST SERPL W P-5'-P-CCNC: 17 U/L (ref 0–45)
BILIRUB DIRECT SERPL-MCNC: <0.2 MG/DL (ref 0–0.3)
BILIRUB SERPL-MCNC: 0.2 MG/DL
BUN SERPL-MCNC: 12.9 MG/DL (ref 6–20)
CALCIUM SERPL-MCNC: 7.6 MG/DL (ref 8.6–10)
CHLORIDE SERPL-SCNC: 110 MMOL/L (ref 98–107)
CREAT SERPL-MCNC: 1.2 MG/DL (ref 0.51–0.95)
DEPRECATED HCO3 PLAS-SCNC: 25 MMOL/L (ref 22–29)
EGFRCR SERPLBLD CKD-EPI 2021: 55 ML/MIN/1.73M2
ERYTHROCYTE [DISTWIDTH] IN BLOOD BY AUTOMATED COUNT: 13.1 % (ref 10–15)
GLUCOSE SERPL-MCNC: 172 MG/DL (ref 70–99)
HCT VFR BLD AUTO: 34.7 % (ref 35–47)
HGB BLD-MCNC: 11.2 G/DL (ref 11.7–15.7)
MAGNESIUM SERPL-MCNC: 1.5 MG/DL (ref 1.7–2.3)
MCH RBC QN AUTO: 31 PG (ref 26.5–33)
MCHC RBC AUTO-ENTMCNC: 32.3 G/DL (ref 31.5–36.5)
MCV RBC AUTO: 96 FL (ref 78–100)
PHOSPHATE SERPL-MCNC: 3 MG/DL (ref 2.5–4.5)
PLATELET # BLD AUTO: 174 10E3/UL (ref 150–450)
POTASSIUM SERPL-SCNC: 5 MMOL/L (ref 3.4–5.3)
PROT SERPL-MCNC: 5.6 G/DL (ref 6.4–8.3)
RBC # BLD AUTO: 3.61 10E6/UL (ref 3.8–5.2)
SODIUM SERPL-SCNC: 140 MMOL/L (ref 135–145)
TACROLIMUS BLD-MCNC: 5.8 UG/L (ref 5–15)
TME LAST DOSE: NORMAL H
TME LAST DOSE: NORMAL H
WBC # BLD AUTO: 5 10E3/UL (ref 4–11)

## 2024-06-18 PROCEDURE — 36415 COLL VENOUS BLD VENIPUNCTURE: CPT | Performed by: PATHOLOGY

## 2024-06-18 PROCEDURE — 83735 ASSAY OF MAGNESIUM: CPT | Performed by: PATHOLOGY

## 2024-06-18 PROCEDURE — 85027 COMPLETE CBC AUTOMATED: CPT | Performed by: PATHOLOGY

## 2024-06-18 PROCEDURE — 82248 BILIRUBIN DIRECT: CPT | Performed by: PATHOLOGY

## 2024-06-18 PROCEDURE — 80053 COMPREHEN METABOLIC PANEL: CPT | Performed by: PATHOLOGY

## 2024-06-18 PROCEDURE — 99000 SPECIMEN HANDLING OFFICE-LAB: CPT | Performed by: PATHOLOGY

## 2024-06-18 PROCEDURE — 84100 ASSAY OF PHOSPHORUS: CPT | Performed by: PATHOLOGY

## 2024-06-18 PROCEDURE — 99215 OFFICE O/P EST HI 40 MIN: CPT | Performed by: INTERNAL MEDICINE

## 2024-06-18 PROCEDURE — G0463 HOSPITAL OUTPT CLINIC VISIT: HCPCS | Performed by: INTERNAL MEDICINE

## 2024-06-18 PROCEDURE — 80197 ASSAY OF TACROLIMUS: CPT | Performed by: TRANSPLANT SURGERY

## 2024-06-18 ASSESSMENT — PAIN SCALES - GENERAL: PAINLEVEL: NO PAIN (0)

## 2024-06-18 NOTE — LETTER
6/18/2024      Susan Puckett  [address] [id number]      Dear Colleague,    Thank you for referring your patient, Susan Puckett, to the [facility] [place]. Please see a copy of my visit note below.    Solid Organ Transplant Hepatology Follow-Up Visit:     HISTORY OF PRESENT ILLNESS:   I had the pleasure of seeing Susan Puckett for followup in the Liver Clinic at the [facility] on June 18, 2024. Ms. 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     Overall, she is doing fairly well.  She does note some mild zelda-incisional abdominal pain.  She does note some itching without a skin rash.  She does have fatigue. She denies any increased abdominal girth.  She has very mild lower extremity edema.     She denies any fevers or chills.  She denies any cough, but does have shortness of breath.  She does have nausea without vomiting.  She is using Zofran and Reglan.  She does have occasional increased loose stools in the morning.  Her appetite has been good, and her weight is stable.     Otherwise, there have been no other new events since she was last seen.    Medications:   Current Outpatient Medications   Medication Sig Dispense Refill     acetaminophen (TYLENOL) 500 MG tablet Take 1 tablet (500 mg) by mouth 4 times daily (before meals and nightly) 60 tablet 0     alendronate (FOSAMAX) 70 MG tablet Take 1 tablet (70 mg) by mouth every 7 days 13 tablet 3     calcium carbonate-vitamin D (OSCAL) 500-5 MG-MCG tablet Take 1 tablet by mouth 2 times daily       COMPOUND CONTAINING CONTROLLED SUBSTANCE (CMPD RX) - PHARMACY TO MIX COMPOUNDED MEDICATION 5mg of valium cream to be used vaginally every night 1 g 0     cyanocobalamin (VITAMIN B-12) 1000 MCG tablet Take 1,000 mcg by mouth every 7 days on Wednesdays       escitalopram (LEXAPRO) 20 MG tablet Take 30 mg by  "mouth daily       folic acid (FOLVITE) 1 MG tablet Take 1 mg by mouth every morning       levothyroxine (SYNTHROID/LEVOTHROID) 125 MCG tablet Take 125 mcg by mouth daily       magnesium glycinate 100 MG CAPS capsule Take 200 mg by mouth 2 times daily       melatonin 5 MG tablet Take 5 mg by mouth At Bedtime       metoclopramide (REGLAN) 5 MG tablet Take 1 tablet (5 mg) by mouth 3 times daily as needed 100 tablet 3     naratriptan (AMERGE) 2.5 MG tablet Take 2.5 mg by mouth at onset of headache for migraine       ondansetron (ZOFRAN ODT) 8 MG ODT tab Take 1 tablet (8 mg) by mouth every 8 hours as needed for nausea 30 tablet 0     sitagliptin (JANUVIA) 100 MG tablet Take 100 mg by mouth daily       tacrolimus (GENERIC) 1 MG capsule Take 2 capsules (2 mg) by mouth every 12 hours 540 capsule 3     topiramate (TOPAMAX) 50 MG tablet Take 100 mg by mouth at bedtime       gabapentin (NEURONTIN) 100 MG capsule Take 3 capsules (300 mg) by mouth 3 times daily for 30 days 270 capsule 0     No current facility-administered medications for this visit.      Vitals:   BP (!) 147/86   Pulse 50   Ht 1.676 m (5' 6\")   Wt 68.5 kg (151 lb)   BMI 24.37 kg/m      Physical Exam:   In general she looks quite well. HEENT exam shows no scleral icterus or temporal muscle wasting. Chest is clear. Abdominal exam shows no increase in girth. No masses or tenderness to palpation are present. Liver is 10 cm in span without left lobe enlargement. No spleen tip is palpable. Extremity exam shows no edema. Skin exam shows no stigmata of chronic liver disease. Neurologic exam shows no asterixis.     Labs:   Lab Results   Component Value Date     06/18/2024    POTASSIUM 5.0 06/18/2024    CHLORIDE 110 (H) 06/18/2024    ANIONGAP 5 (L) 06/18/2024    CO2 25 06/18/2024    BUN 12.9 06/18/2024    CR 1.20 (H) 06/18/2024    GFRESTIMATED 55 (L) 06/18/2024    MAURI 7.6 (L) 06/18/2024      Lab Results   Component Value Date    WBC 5.0 06/18/2024    HGB 11.2 " (L) 06/18/2024    HCT 34.7 (L) 06/18/2024    MCV 96 06/18/2024    MCH 31.0 06/18/2024    MCHC 32.3 06/18/2024    RDW 13.1 06/18/2024     06/18/2024     Lab Results   Component Value Date    ALBUMIN 2.9 (L) 05/01/2024    ALKPHOS 108 05/01/2024    AST 36 05/01/2024     Lab Results   Component Value Date    INR 1.32 (H) 11/04/2022       Recent Labs   Lab Test 05/01/24  0848 03/06/24  0547 01/24/24  0846 12/18/23  1401 12/04/23  0902 11/06/23  0851 10/02/23  0909 09/05/23  0833 08/11/23  0948 07/10/23  1035   ALKPHOS 108 117 101 101 99 136* 111* 112* 123* 107*   ALT 22 7 9 20 19 38 17 <5 10 10   AST 36 15 23 31 26 33 20 18 23 25        Imaging:   No images are attached to the encounter.     Assessment/Plan:   IMPRESSION:   My impression is that Ms. Puckett is doing fairly well status post liver transplantation.  Her hemolytic anemia has completely resolved.  I am not what her nausea is related to.  She is scheduled for a colonoscopy later this summer and I have recommended she get an upper GI endoscopy as well.  The good news is it is not affecting her weight.     She has seen the dermatologist already and is otherwise up to date on vaccines as well.  My plan will be to see her back in the clinic in 6 months.       I did spend a total of 40 minutes (on the date of the encounter), including 30 minutes of face-to-face clinic time including counseling. The rest of the time was spent in documentation and review of records.    Thank you very much for allowing me to participate in the care of this patient.  If you have any questions regarding my recommendations, please do not hesitate to contact me.         Raphael Cook MD      Professor of Medicine  [employer]      Executive Medical Director, Solid Organ Transplant Program  [facility]

## 2024-06-18 NOTE — NURSING NOTE
"Chief Complaint   Patient presents with    Follow Up     Post Txp-Liver     BP (!) 147/86   Pulse 50   Ht 1.676 m (5' 6\")   Wt 68.5 kg (151 lb)   BMI 24.37 kg/m    Mellissa Villalta MA on 6/18/2024 at 9:01 AM    "

## 2024-06-18 NOTE — PROGRESS NOTES
Solid Organ Transplant Hepatology Follow-Up Visit:     HISTORY OF PRESENT ILLNESS:   I had the pleasure of seeing Susan Puckett for followup in the Liver Clinic at the [facility] on June 18, 2024. Ms. 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     Overall, she is doing fairly well.  She does note some mild zelda-incisional abdominal pain.  She does note some itching without a skin rash.  She does have fatigue. She denies any increased abdominal girth.  She has very mild lower extremity edema.     She denies any fevers or chills.  She denies any cough, but does have shortness of breath.  She does have nausea without vomiting.  She is using Zofran and Reglan.  She does have occasional increased loose stools in the morning.  Her appetite has been good, and her weight is stable.     Otherwise, there have been no other new events since she was last seen.    Medications:   Current Outpatient Medications   Medication Sig Dispense Refill    acetaminophen (TYLENOL) 500 MG tablet Take 1 tablet (500 mg) by mouth 4 times daily (before meals and nightly) 60 tablet 0    alendronate (FOSAMAX) 70 MG tablet Take 1 tablet (70 mg) by mouth every 7 days 13 tablet 3    calcium carbonate-vitamin D (OSCAL) 500-5 MG-MCG tablet Take 1 tablet by mouth 2 times daily      COMPOUND CONTAINING CONTROLLED SUBSTANCE (CMPD RX) - PHARMACY TO MIX COMPOUNDED MEDICATION 5mg of valium cream to be used vaginally every night 1 g 0    cyanocobalamin (VITAMIN B-12) 1000 MCG tablet Take 1,000 mcg by mouth every 7 days on Wednesdays      escitalopram (LEXAPRO) 20 MG tablet Take 30 mg by mouth daily      folic acid (FOLVITE) 1 MG tablet Take 1 mg by mouth every morning      levothyroxine (SYNTHROID/LEVOTHROID) 125 MCG tablet Take 125 mcg by mouth daily      magnesium glycinate 100 MG CAPS capsule Take 200 mg by mouth 2 times daily       "melatonin 5 MG tablet Take 5 mg by mouth At Bedtime      metoclopramide (REGLAN) 5 MG tablet Take 1 tablet (5 mg) by mouth 3 times daily as needed 100 tablet 3    naratriptan (AMERGE) 2.5 MG tablet Take 2.5 mg by mouth at onset of headache for migraine      ondansetron (ZOFRAN ODT) 8 MG ODT tab Take 1 tablet (8 mg) by mouth every 8 hours as needed for nausea 30 tablet 0    sitagliptin (JANUVIA) 100 MG tablet Take 100 mg by mouth daily      tacrolimus (GENERIC) 1 MG capsule Take 2 capsules (2 mg) by mouth every 12 hours 540 capsule 3    topiramate (TOPAMAX) 50 MG tablet Take 100 mg by mouth at bedtime      gabapentin (NEURONTIN) 100 MG capsule Take 3 capsules (300 mg) by mouth 3 times daily for 30 days 270 capsule 0     No current facility-administered medications for this visit.      Vitals:   BP (!) 147/86   Pulse 50   Ht 1.676 m (5' 6\")   Wt 68.5 kg (151 lb)   BMI 24.37 kg/m      Physical Exam:   In general she looks quite well. HEENT exam shows no scleral icterus or temporal muscle wasting. Chest is clear. Abdominal exam shows no increase in girth. No masses or tenderness to palpation are present. Liver is 10 cm in span without left lobe enlargement. No spleen tip is palpable. Extremity exam shows no edema. Skin exam shows no stigmata of chronic liver disease. Neurologic exam shows no asterixis.     Labs:   Lab Results   Component Value Date     06/18/2024    POTASSIUM 5.0 06/18/2024    CHLORIDE 110 (H) 06/18/2024    ANIONGAP 5 (L) 06/18/2024    CO2 25 06/18/2024    BUN 12.9 06/18/2024    CR 1.20 (H) 06/18/2024    GFRESTIMATED 55 (L) 06/18/2024    MAURI 7.6 (L) 06/18/2024      Lab Results   Component Value Date    WBC 5.0 06/18/2024    HGB 11.2 (L) 06/18/2024    HCT 34.7 (L) 06/18/2024    MCV 96 06/18/2024    MCH 31.0 06/18/2024    MCHC 32.3 06/18/2024    RDW 13.1 06/18/2024     06/18/2024     Lab Results   Component Value Date    ALBUMIN 2.9 (L) 05/01/2024    ALKPHOS 108 05/01/2024    AST 36 " 05/01/2024     Lab Results   Component Value Date    INR 1.32 (H) 11/04/2022       Recent Labs   Lab Test 05/01/24  0848 03/06/24  0547 01/24/24  0846 12/18/23  1401 12/04/23  0902 11/06/23  0851 10/02/23  0909 09/05/23  0833 08/11/23  0948 07/10/23  1035   ALKPHOS 108 117 101 101 99 136* 111* 112* 123* 107*   ALT 22 7 9 20 19 38 17 <5 10 10   AST 36 15 23 31 26 33 20 18 23 25        Imaging:   No images are attached to the encounter.     Assessment/Plan:   IMPRESSION:   My impression is that Ms. Puckett is doing fairly well status post liver transplantation.  Her hemolytic anemia has completely resolved.  I am not what her nausea is related to.  She is scheduled for a colonoscopy later this summer and I have recommended she get an upper GI endoscopy as well.  The good news is it is not affecting her weight.     She has seen the dermatologist already and is otherwise up to date on vaccines as well.  My plan will be to see her back in the clinic in 6 months.       I did spend a total of 40 minutes (on the date of the encounter), including 30 minutes of face-to-face clinic time including counseling. The rest of the time was spent in documentation and review of records.    Thank you very much for allowing me to participate in the care of this patient.  If you have any questions regarding my recommendations, please do not hesitate to contact me.         Raphael Cook MD      Professor of Medicine  [employer]      Executive Medical Director, Solid Organ Transplant Program  [facility]

## 2024-07-19 DIAGNOSIS — Z94.4 LIVER TRANSPLANTED (H): Primary | ICD-10-CM
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
## 2024-07-31 ENCOUNTER — OFFICE VISIT (OUTPATIENT)
Dept: DERMATOLOGY | Facility: CLINIC | Age: 54
End: 2024-07-31
Payer: COMMERCIAL

## 2024-07-31 DIAGNOSIS — D22.9 MULTIPLE BENIGN NEVI: ICD-10-CM

## 2024-07-31 DIAGNOSIS — L81.4 SOLAR LENTIGO: ICD-10-CM

## 2024-07-31 DIAGNOSIS — D18.01 CHERRY ANGIOMA: ICD-10-CM

## 2024-07-31 DIAGNOSIS — L82.1 SEBORRHEIC KERATOSIS: ICD-10-CM

## 2024-07-31 DIAGNOSIS — L30.9 DERMATITIS: Primary | ICD-10-CM

## 2024-07-31 DIAGNOSIS — Z94.4 LIVER REPLACED BY TRANSPLANT (H): ICD-10-CM

## 2024-07-31 DIAGNOSIS — L65.9 NONSCARRING HAIR LOSS: ICD-10-CM

## 2024-07-31 PROCEDURE — 99213 OFFICE O/P EST LOW 20 MIN: CPT | Performed by: PHYSICIAN ASSISTANT

## 2024-07-31 PROCEDURE — G2211 COMPLEX E/M VISIT ADD ON: HCPCS | Performed by: PHYSICIAN ASSISTANT

## 2024-07-31 RX ORDER — TRIAMCINOLONE ACETONIDE 1 MG/G
CREAM TOPICAL
Qty: 45 G | Refills: 3 | Status: SHIPPED | OUTPATIENT
Start: 2024-07-31

## 2024-07-31 NOTE — LETTER
7/31/2024      Susan Puckett  [address] [id number]      Dear Colleague,    Thank you for referring your patient, Susan Puckett, to the [facility]. Please see a copy of my visit note below.    Susan Puckett is a pleasant 54 year old year old female patient here today for skin. She notes she has been having dry, itchy skin. She uses bath and body works products. She also notes scaly scalp. She also note hair shedding the past few months. She notes that she has been under stress. She notes she had a hospital stay this past winter. She notes no family history of hair loss. Patient has no other skin complaints today.  Remainder of the HPI, Meds, PMH, Allergies, FH, and SH was reviewed in chart.    Pertinent Hx:   No personal history of skin cancer. History of liver transplant   Past Medical History:   Diagnosis Date     Anemia      Anxiety      Cold sore      Depressive disorder      Diabetes (H)     Type 2 DM/No Insulin      E. coli UTI 11/19/2022     Encephalopathy      Esophageal varices without bleeding (H)     grade I     History of blood transfusion     10/2019     History of seizure     diabetic seizure     Infertility management 11/05/2015     Insomnia      Liver cirrhosis secondary to CUETO (H) 05/28/2020     Migraine      Pleural effusion      Thrombocytopenia (H24)      Thyroid disease        Past Surgical History:   Procedure Laterality Date     APPENDECTOMY      1989     BENCH LIVER N/A 11/2/2022    Procedure: Bench liver;  Surgeon: Delbert Morgan MD;  Location: [facility] OR     COLONOSCOPY      1/2020 at [facility]      ENT SURGERY  1998    tempanoplasty     GI SURGERY      EGD August 2020 at [facility]      GYN SURGERY  2010    laparoscopic ablation     IR PARACENTESIS  6/8/2020     IR THORACENTESIS  6/6/2022     IR THORACENTESIS  1/11/2022     IR THORACENTESIS  12/2/2021     IR THORACENTESIS  11/29/2021     IR THORACENTESIS  11/23/2021     IR THORACENTESIS  11/12/2021      IR THORACENTESIS  2021     IR THORACENTESIS  2021     IR THORACENTESIS  10/29/2021     IR THORACENTESIS  10/28/2021     IR THORACENTESIS  10/27/2021     IR THORACENTESIS  10/24/2021     IR THORACENTESIS  10/22/2021     IR THORACENTESIS  10/5/2021     IR THORACENTESIS  2021     IR THORACENTESIS  2021     IR THORACENTESIS  2021     IR THORACENTESIS  2021     IR THORACENTESIS  2021     IR THORACENTESIS  2021     IR THORACENTESIS  8/10/2021     IR THORACENTESIS  2021     IR THORACENTESIS  2021     IR THORACENTESIS  2021     IR THORACENTESIS  2021     IR THORACENTESIS  2021     IR THORACENTESIS  3/31/2021     IR THORACENTESIS  2020     IR TRANSVEN INTRAHEPATIC PORTOSYST SHUNT  2021     MAMMOPLASTY REDUCTION BILATERAL       CT STAB PHELBECTOMY VARICOSE VEINS, LESS THAN 10 INCISIONS, ONE EXTREMITY       RNY gastric bypass  2006    retoocolic, retrogastric, [facility]     TONSILLECTOMY & ADENOIDECTOMY Bilateral      TRANSPLANT LIVER RECIPIENT  DONOR N/A 2022    Procedure: TRANSPLANT, LIVER, RECIPIENT,  DONOR;  Surgeon: Delbert Morgan MD;  Location: [facility] OR     WISDOM TEETH EXTRACTION          Family History   Problem Relation Age of Onset     Anesthesia Reaction Nephew         PONV     Bleeding Disorder No family hx of      Clotting Disorder No family hx of        Social History     Socioeconomic History     Marital status:      Spouse name: Not on file     Number of children: Not on file     Years of education: Not on file     Highest education level: Bachelor's degree (e.g., BA, AB, BS)   Occupational History     Not on file   Tobacco Use     Smoking status: Never     Smokeless tobacco: Never   Vaping Use     Vaping status: Never Used   Substance and Sexual Activity     Alcohol use: Not Currently     Comment: Last drink was in 2017     Drug use: Never     Sexual activity: Not on file   Other Topics  Concern     Parent/sibling w/ CABG, MI or angioplasty before 65F 55M? Not Asked   Social History Narrative     Not on file     Social Determinants of Health     Financial Resource Strain: Not on file   Food Insecurity: No Food Insecurity (2/22/2024)    Received from [facility]     Hunger Vital Sign      Worried About Running Out of Food in the Last Year: Never true      Ran Out of Food in the Last Year: Never true   Transportation Needs: No Transportation Needs (2/22/2024)    Received from [facility]     PRAPARE - Transportation      Lack of Transportation (Medical): No      Lack of Transportation (Non-Medical): No   Physical Activity: Insufficiently Active (11/5/2020)    Exercise Vital Sign      Days of Exercise per Week: 1 day      Minutes of Exercise per Session: 10 min   Stress: Stress Concern Present (11/5/2020)    [place] [employer] - Occupational Stress Questionnaire      Feeling of Stress : To some extent   Social Connections: Moderately Integrated (11/5/2020)    Social Connection and Isolation Panel [NHANES]      Frequency of Communication with Friends and Family: More than three times a week      Frequency of Social Gatherings with Friends and Family: Once a week      Attends Faith Services: More than 4 times per year      Active Member of Clubs or Organizations: No      Attends Club or Organization Meetings: Never      Marital Status:    Interpersonal Safety: Not At Risk (2/21/2024)    Received from [facility]     Humiliation, Afraid, Rape, and Kick questionnaire      Fear of Current or Ex-Partner: No      Emotionally Abused: No      Physically Abused: No      Sexually Abused: No   Housing Stability: Low Risk  (2/22/2024)    Received from [facility]     Housing Stability Vital Sign      Unable to Pay for Housing in the Last Year: No       Number of Places Lived in the Last Year: 1      Unstable Housing in the Last Year: No       Outpatient Encounter Medications as of 7/31/2024   Medication Sig Dispense Refill     triamcinolone (KENALOG) 0.1 % external cream Apply sparingly twice daily as needed to rash on shoulders as needed. 45 g 3     acetaminophen (TYLENOL) 500 MG tablet Take 1 tablet (500 mg) by mouth 4 times daily (before meals and nightly) 60 tablet 0     alendronate (FOSAMAX) 70 MG tablet Take 1 tablet (70 mg) by mouth every 7 days 13 tablet 3     calcium carbonate-vitamin D (OSCAL) 500-5 MG-MCG tablet Take 1 tablet by mouth 2 times daily       COMPOUND CONTAINING CONTROLLED SUBSTANCE (CMPD RX) - PHARMACY TO MIX COMPOUNDED MEDICATION 5mg of valium cream to be used vaginally every night 1 g 0     cyanocobalamin (VITAMIN B-12) 1000 MCG tablet Take 1,000 mcg by mouth every 7 days on Wednesdays       escitalopram (LEXAPRO) 20 MG tablet Take 30 mg by mouth daily       folic acid (FOLVITE) 1 MG tablet Take 1 mg by mouth every morning       gabapentin (NEURONTIN) 100 MG capsule Take 3 capsules (300 mg) by mouth 3 times daily for 30 days 270 capsule 0     HIGH ABSORPTION MAGNESIUM 100 MG TABS TAKE TWO TABLETS BY MOUTH TWICE A  tablet 3     levothyroxine (SYNTHROID/LEVOTHROID) 125 MCG tablet Take 125 mcg by mouth daily       magnesium glycinate 100 MG CAPS capsule Take 200 mg by mouth 2 times daily       melatonin 5 MG tablet Take 5 mg by mouth At Bedtime       metoclopramide (REGLAN) 5 MG tablet Take 1 tablet (5 mg) by mouth 3 times daily as needed 100 tablet 3     naratriptan (AMERGE) 2.5 MG tablet Take 2.5 mg by mouth at onset of headache for migraine       ondansetron (ZOFRAN ODT) 8 MG ODT tab Take 1 tablet (8 mg) by mouth every 8 hours as needed for nausea 30 tablet 0     sitagliptin (JANUVIA) 100 MG tablet Take 100 mg by mouth daily       tacrolimus (GENERIC) 1 MG capsule Take 2 capsules (2 mg) by mouth every 12 hours 540 capsule 3      topiramate (TOPAMAX) 50 MG tablet Take 100 mg by mouth at bedtime       No facility-administered encounter medications on file as of 7/31/2024.             O:   NAD, WDWN, Alert & Oriented, Mood & Affect wnl, Vitals stable   Here today alone   There were no vitals taken for this visit.   General appearance normal   Vitals stable   Alert, oriented and in no acute distress        Eczematous small patch on right upper arm   Mild xerosis   Mild scaling on scalp   Stuck on papules and brown macules on trunk and ext   Red papules on trunk  Brown macules with regular pigment network and borders on torso and extremities   Non scarring hair loss    The remainder of skin exam is normal       Eyes: Conjunctivae/lids:Normal     ENT: Lips normal    MSK:Normal    Cardiovascular: peripheral edema none    Pulm: Breathing Normal    Neuro/Psych: Orientation:Alert and Orientedx3 ; Mood/Affect:normal     A/P:  1. Xerosis/atopic dermatitis     For showering use Cetaphil, Dove, or Vanicream soap. Apply bland mositurizers such as  Cetaphil, Cerave, Vanicream, Eucerin cream at twice daily.  Recommend triamcinolone twice daily as needed if developing a rash.  2. Seborrheic dermatitis   Recommend nizoral shampoo otc once weekly.   3. Nonscarring hair loss  Discussed favor telogen effluvium due to recent hospital stay, stressor on body.   She takes daily multivitamin  Discussed potential trying rogaine, but is not interested at this time.   4. Seborrheic keratosis, lentigo, angioma, benign nevi, History of liver transplant.   It was a pleasure speaking to Susan Puckett today.  BENIGN LESIONS DISCUSSED WITH PATIENT:  I discussed the specifics of tumor, prognosis, and genetics of benign lesions.  I explained that treatment of these lesions would be purely cosmetic and not medically neccessary.  I discussed with patient different removal options including excision, cautery and /or laser.      Nature and genetics of benign skin lesions dicussed  with patient.  Signs and Symptoms of skin cancer discussed with patient.  ABCDEs of melanoma reviewed with patient.  Patient encouraged to perform monthly skin exams.  UV precautions reviewed with patient.  Risks of non-melanoma skin cancer discussed with patient   Return to clinic in one year or sooner if needed.       Again, thank you for allowing me to participate in the care of your patient.        Sincerely,        Melly Mahan PA-C

## 2024-07-31 NOTE — PROGRESS NOTES
Susan Puckett is a pleasant 54 year old year old female patient here today for skin. She notes she has been having dry, itchy skin. She uses bath and body works products. She also notes scaly scalp. She also note hair shedding the past few months. She notes that she has been under stress. She notes she had a hospital stay this past winter. She notes no family history of hair loss. Patient has no other skin complaints today.  Remainder of the HPI, Meds, PMH, Allergies, FH, and SH was reviewed in chart.    Pertinent Hx:   No personal history of skin cancer. History of liver transplant   Past Medical History:   Diagnosis Date    Anemia     Anxiety     Cold sore     Depressive disorder     Diabetes (H)     Type 2 DM/No Insulin     E. coli UTI 11/19/2022    Encephalopathy     Esophageal varices without bleeding (H)     grade I    History of blood transfusion     10/2019    History of seizure     diabetic seizure    Infertility management 11/05/2015    Insomnia     Liver cirrhosis secondary to CUETO (H) 05/28/2020    Migraine     Pleural effusion     Thrombocytopenia (H24)     Thyroid disease        Past Surgical History:   Procedure Laterality Date    APPENDECTOMY      1989    BENCH LIVER N/A 11/2/2022    Procedure: Bench liver;  Surgeon: Delbert Morgan MD;  Location: [facility] OR    COLONOSCOPY      1/2020 at [facility]     ENT SURGERY  1998    tempanoplasty    GI SURGERY      EGD August 2020 at [facility]     GYN SURGERY  2010    laparoscopic ablation    IR PARACENTESIS  6/8/2020    IR THORACENTESIS  6/6/2022    IR THORACENTESIS  1/11/2022    IR THORACENTESIS  12/2/2021    IR THORACENTESIS  11/29/2021    IR THORACENTESIS  11/23/2021    IR THORACENTESIS  11/12/2021    IR THORACENTESIS  11/4/2021    IR THORACENTESIS  11/1/2021    IR THORACENTESIS  10/29/2021    IR THORACENTESIS  10/28/2021    IR THORACENTESIS  10/27/2021    IR THORACENTESIS  10/24/2021    IR THORACENTESIS  10/22/2021    IR THORACENTESIS  10/5/2021    IR  THORACENTESIS  2021    IR THORACENTESIS  2021    IR THORACENTESIS  2021    IR THORACENTESIS  2021    IR THORACENTESIS  2021    IR THORACENTESIS  2021    IR THORACENTESIS  8/10/2021    IR THORACENTESIS  2021    IR THORACENTESIS  2021    IR THORACENTESIS  2021    IR THORACENTESIS  2021    IR THORACENTESIS  2021    IR THORACENTESIS  3/31/2021    IR THORACENTESIS  2020    IR TRANSVEN INTRAHEPATIC PORTOSYST SHUNT  2021    MAMMOPLASTY REDUCTION BILATERAL      IN STAB PHELBECTOMY VARICOSE VEINS, LESS THAN 10 INCISIONS, ONE EXTREMITY      RNY gastric bypass  2006    retoocolic, retrogastric, [facility]    TONSILLECTOMY & ADENOIDECTOMY Bilateral     TRANSPLANT LIVER RECIPIENT  DONOR N/A 2022    Procedure: TRANSPLANT, LIVER, RECIPIENT,  DONOR;  Surgeon: Delbert Morgan MD;  Location: [facility] OR    WISDOM TEETH EXTRACTION          Family History   Problem Relation Age of Onset    Anesthesia Reaction Nephew         PONV    Bleeding Disorder No family hx of     Clotting Disorder No family hx of        Social History     Socioeconomic History    Marital status:      Spouse name: Not on file    Number of children: Not on file    Years of education: Not on file    Highest education level: Bachelor's degree (e.g., BA, AB, BS)   Occupational History    Not on file   Tobacco Use    Smoking status: Never    Smokeless tobacco: Never   Vaping Use    Vaping status: Never Used   Substance and Sexual Activity    Alcohol use: Not Currently     Comment: Last drink was in 2017    Drug use: Never    Sexual activity: Not on file   Other Topics Concern    Parent/sibling w/ CABG, MI or angioplasty before 65F 55M? Not Asked   Social History Narrative    Not on file     Social Determinants of Health     Financial Resource Strain: Not on file   Food Insecurity: No Food Insecurity (2024)    Received from [facility]  Health     Hunger Vital Sign     Worried About Running Out of Food in the Last Year: Never true     Ran Out of Food in the Last Year: Never true   Transportation Needs: No Transportation Needs (2/22/2024)    [facility]     PRAPARE - Transportation     Lack of Transportation (Medical): No     Lack of Transportation (Non-Medical): No   Physical Activity: Insufficiently Active (11/5/2020)    Exercise Vital Sign     Days of Exercise per Week: 1 day     Minutes of Exercise per Session: 10 min   Stress: Stress Concern Present (11/5/2020)    [employer] - Occupational Stress Questionnaire     Feeling of Stress : To some extent   Social Connections: Moderately Integrated (11/5/2020)    Social Connection and Isolation Panel [NHANES]     Frequency of Communication with Friends and Family: More than three times a week     Frequency of Social Gatherings with Friends and Family: Once a week     Attends [employer] Services: More than 4 times per year     Active Member of Clubs or Organizations: No     Attends Club or Organization Meetings: Never     Marital Status:    Interpersonal Safety: Not At Risk (2/21/2024)    Received from [facility]     Humiliation, Afraid, Rape, and Kick questionnaire     Fear of Current or Ex-Partner: No     Emotionally Abused: No     Physically Abused: No     Sexually Abused: No   Housing Stability: Low Risk  (2/22/2024)    Received from [facility]     Housing Stability Vital Sign     Unable to Pay for Housing in the Last Year: No     Number of Places Lived in the Last Year: 1     Unstable Housing in the Last Year: No       Outpatient Encounter Medications as of 7/31/2024   Medication Sig Dispense Refill    triamcinolone (KENALOG) 0.1 % external cream Apply sparingly twice daily as needed to rash on shoulders as needed. 45 g 3    acetaminophen  (TYLENOL) 500 MG tablet Take 1 tablet (500 mg) by mouth 4 times daily (before meals and nightly) 60 tablet 0    alendronate (FOSAMAX) 70 MG tablet Take 1 tablet (70 mg) by mouth every 7 days 13 tablet 3    calcium carbonate-vitamin D (OSCAL) 500-5 MG-MCG tablet Take 1 tablet by mouth 2 times daily      COMPOUND CONTAINING CONTROLLED SUBSTANCE (CMPD RX) - PHARMACY TO MIX COMPOUNDED MEDICATION 5mg of valium cream to be used vaginally every night 1 g 0    cyanocobalamin (VITAMIN B-12) 1000 MCG tablet Take 1,000 mcg by mouth every 7 days on Wednesdays      escitalopram (LEXAPRO) 20 MG tablet Take 30 mg by mouth daily      folic acid (FOLVITE) 1 MG tablet Take 1 mg by mouth every morning      gabapentin (NEURONTIN) 100 MG capsule Take 3 capsules (300 mg) by mouth 3 times daily for 30 days 270 capsule 0    HIGH ABSORPTION MAGNESIUM 100 MG TABS TAKE TWO TABLETS BY MOUTH TWICE A  tablet 3    levothyroxine (SYNTHROID/LEVOTHROID) 125 MCG tablet Take 125 mcg by mouth daily      magnesium glycinate 100 MG CAPS capsule Take 200 mg by mouth 2 times daily      melatonin 5 MG tablet Take 5 mg by mouth At Bedtime      metoclopramide (REGLAN) 5 MG tablet Take 1 tablet (5 mg) by mouth 3 times daily as needed 100 tablet 3    naratriptan (AMERGE) 2.5 MG tablet Take 2.5 mg by mouth at onset of headache for migraine      ondansetron (ZOFRAN ODT) 8 MG ODT tab Take 1 tablet (8 mg) by mouth every 8 hours as needed for nausea 30 tablet 0    sitagliptin (JANUVIA) 100 MG tablet Take 100 mg by mouth daily      tacrolimus (GENERIC) 1 MG capsule Take 2 capsules (2 mg) by mouth every 12 hours 540 capsule 3    topiramate (TOPAMAX) 50 MG tablet Take 100 mg by mouth at bedtime       No facility-administered encounter medications on file as of 7/31/2024.             O:   NAD, WDWN, Alert & Oriented, Mood & Affect wnl, Vitals stable   Here today alone   There were no vitals taken for this visit.   General appearance normal   Vitals  stable   Alert, oriented and in no acute distress        Eczematous small patch on right upper arm   Mild xerosis   Mild scaling on scalp   Stuck on papules and brown macules on trunk and ext   Red papules on trunk  Brown macules with regular pigment network and borders on torso and extremities   Non scarring hair loss    The remainder of skin exam is normal       Eyes: Conjunctivae/lids:Normal     ENT: Lips normal    MSK:Normal    Cardiovascular: peripheral edema none    Pulm: Breathing Normal    Neuro/Psych: Orientation:Alert and Orientedx3 ; Mood/Affect:normal     A/P:  1. Xerosis/atopic dermatitis     For showering use Cetaphil, Dove, or Vanicream soap. Apply bland mositurizers such as  Cetaphil, Cerave, Vanicream, Eucerin cream at twice daily.  Recommend triamcinolone twice daily as needed if developing a rash.  2. Seborrheic dermatitis   Recommend nizoral shampoo otc once weekly.   3. Nonscarring hair loss  Discussed favor telogen effluvium due to recent hospital stay, stressor on body.   She takes daily multivitamin  Discussed potential trying rogaine, but is not interested at this time.   4. Seborrheic keratosis, lentigo, angioma, benign nevi, History of liver transplant.   It was a pleasure speaking to Susan Puckett today.  BENIGN LESIONS DISCUSSED WITH PATIENT:  I discussed the specifics of tumor, prognosis, and genetics of benign lesions.  I explained that treatment of these lesions would be purely cosmetic and not medically neccessary.  I discussed with patient different removal options including excision, cautery and /or laser.      Nature and genetics of benign skin lesions dicussed with patient.  Signs and Symptoms of skin cancer discussed with patient.  ABCDEs of melanoma reviewed with patient.  Patient encouraged to perform monthly skin exams.  UV precautions reviewed with patient.  Risks of non-melanoma skin cancer discussed with patient   Return to clinic in one year or sooner if needed.

## 2024-08-21 ENCOUNTER — LAB (OUTPATIENT)
Dept: LAB | Facility: CLINIC | Age: 54
End: 2024-08-21
Payer: COMMERCIAL

## 2024-08-21 DIAGNOSIS — Z94.4 LIVER REPLACED BY TRANSPLANT (H): ICD-10-CM

## 2024-08-21 LAB
ALBUMIN MFR UR ELPH: 6.1 MG/DL
ALBUMIN SERPL BCG-MCNC: 3.5 G/DL (ref 3.5–5.2)
ALBUMIN UR-MCNC: NEGATIVE MG/DL
ALP SERPL-CCNC: 109 U/L (ref 40–150)
ALT SERPL W P-5'-P-CCNC: 14 U/L (ref 0–50)
ANION GAP SERPL CALCULATED.3IONS-SCNC: 8 MMOL/L (ref 7–15)
APPEARANCE UR: CLEAR
AST SERPL W P-5'-P-CCNC: 27 U/L (ref 0–45)
BILIRUB DIRECT SERPL-MCNC: <0.2 MG/DL (ref 0–0.3)
BILIRUB SERPL-MCNC: 0.3 MG/DL
BILIRUB UR QL STRIP: NEGATIVE
BUN SERPL-MCNC: 11.7 MG/DL (ref 6–20)
CALCIUM SERPL-MCNC: 7.3 MG/DL (ref 8.8–10.4)
CHLORIDE SERPL-SCNC: 106 MMOL/L (ref 98–107)
COLOR UR AUTO: COLORLESS
CREAT SERPL-MCNC: 1.1 MG/DL (ref 0.51–0.95)
CREAT UR-MCNC: 34.1 MG/DL
EGFRCR SERPLBLD CKD-EPI 2021: 60 ML/MIN/1.73M2
ERYTHROCYTE [DISTWIDTH] IN BLOOD BY AUTOMATED COUNT: 12.6 % (ref 10–15)
GLUCOSE SERPL-MCNC: 450 MG/DL (ref 70–99)
GLUCOSE UR STRIP-MCNC: >1000 MG/DL
HCO3 SERPL-SCNC: 24 MMOL/L (ref 22–29)
HCT VFR BLD AUTO: 34.7 % (ref 35–47)
HGB BLD-MCNC: 11.2 G/DL (ref 11.7–15.7)
HGB UR QL STRIP: NEGATIVE
KETONES UR STRIP-MCNC: NEGATIVE MG/DL
LEUKOCYTE ESTERASE UR QL STRIP: NEGATIVE
MAGNESIUM SERPL-MCNC: 1.7 MG/DL (ref 1.7–2.3)
MCH RBC QN AUTO: 30.3 PG (ref 26.5–33)
MCHC RBC AUTO-ENTMCNC: 32.3 G/DL (ref 31.5–36.5)
MCV RBC AUTO: 94 FL (ref 78–100)
NITRATE UR QL: NEGATIVE
PH UR STRIP: 5.5 [PH] (ref 5–7)
PHOSPHATE SERPL-MCNC: 5.2 MG/DL (ref 2.5–4.5)
PLATELET # BLD AUTO: 159 10E3/UL (ref 150–450)
POTASSIUM SERPL-SCNC: 4.4 MMOL/L (ref 3.4–5.3)
PROT SERPL-MCNC: 6.4 G/DL (ref 6.4–8.3)
PROT/CREAT 24H UR: 0.18 MG/MG CR (ref 0–0.2)
RBC # BLD AUTO: 3.7 10E6/UL (ref 3.8–5.2)
SODIUM SERPL-SCNC: 138 MMOL/L (ref 135–145)
SP GR UR STRIP: 1.03 (ref 1–1.03)
TACROLIMUS BLD-MCNC: 5.2 UG/L (ref 5–15)
TME LAST DOSE: NORMAL H
TME LAST DOSE: NORMAL H
UROBILINOGEN UR STRIP-MCNC: NORMAL MG/DL
WBC # BLD AUTO: 5.8 10E3/UL (ref 4–11)

## 2024-08-21 PROCEDURE — 80053 COMPREHEN METABOLIC PANEL: CPT

## 2024-08-21 PROCEDURE — 84156 ASSAY OF PROTEIN URINE: CPT

## 2024-08-21 PROCEDURE — 36415 COLL VENOUS BLD VENIPUNCTURE: CPT

## 2024-08-21 PROCEDURE — 85027 COMPLETE CBC AUTOMATED: CPT

## 2024-08-21 PROCEDURE — 83735 ASSAY OF MAGNESIUM: CPT

## 2024-08-21 PROCEDURE — 80197 ASSAY OF TACROLIMUS: CPT

## 2024-08-21 PROCEDURE — 87517 HEPATITIS B DNA QUANT: CPT

## 2024-08-21 PROCEDURE — 84100 ASSAY OF PHOSPHORUS: CPT

## 2024-08-21 PROCEDURE — 82248 BILIRUBIN DIRECT: CPT

## 2024-08-21 PROCEDURE — 81003 URINALYSIS AUTO W/O SCOPE: CPT

## 2024-08-22 ENCOUNTER — TELEPHONE (OUTPATIENT)
Dept: TRANSPLANT | Facility: CLINIC | Age: 54
End: 2024-08-22
Payer: COMMERCIAL

## 2024-08-22 DIAGNOSIS — Z94.4 LIVER REPLACED BY TRANSPLANT (H): ICD-10-CM

## 2024-08-22 RX ORDER — TACROLIMUS 1 MG/1
CAPSULE ORAL
Qty: 270 CAPSULE | Refills: 3 | Status: SHIPPED | OUTPATIENT
Start: 2024-08-22

## 2024-08-22 NOTE — TELEPHONE ENCOUNTER
ISSUE:   Tacrolimus IR level 5.2 on 8/21, goal 3-5, dose 2 mg BID.    PLAN:   Call Patient and confirm this was an accurate 12-hour trough, looks like 13 hours.   Verify Tacrolimus IR dose 2 mg BID.   Confirm no new medications or or missed doses.   Confirm no new illness / infection / diarrhea.   If accurate trough and accurate dose, decrease Tacrolimus IR dose to 2 mg AM, 1 mg PM.     Is this more than a 50% increase or decrease in current IS dose: No    Repeat labs in 2 weeks.    Megan Joseph RN      OUTCOME:   Spoke with Patient, they confirm accurate trough level and current dose 2 mg BID.   Patient confirmed dose change to 2 mg AM, 1 mg PM.  Patient agreed to repeat labs in 2 weeks.   Orders sent to preferred pharmacy for dose change and lab for repeat labs.   Patient voiced understanding of plan.     Susan confirmed dose change and lab follow up via [employer] message.

## 2024-08-23 LAB — HBV DNA SERPL NAA+PROBE-ACNC: NOT DETECTED IU/ML

## 2024-10-13 ENCOUNTER — HEALTH MAINTENANCE LETTER (OUTPATIENT)
Age: 54
End: 2024-10-13

## 2024-10-14 ENCOUNTER — LAB (OUTPATIENT)
Dept: LAB | Facility: CLINIC | Age: 54
End: 2024-10-14
Payer: COMMERCIAL

## 2024-10-14 DIAGNOSIS — Z94.4 LIVER REPLACED BY TRANSPLANT (H): ICD-10-CM

## 2024-10-14 LAB
ALBUMIN SERPL BCG-MCNC: 3.3 G/DL (ref 3.5–5.2)
ALP SERPL-CCNC: 137 U/L (ref 40–150)
ALT SERPL W P-5'-P-CCNC: 24 U/L (ref 0–50)
ANION GAP SERPL CALCULATED.3IONS-SCNC: 6 MMOL/L (ref 7–15)
AST SERPL W P-5'-P-CCNC: 26 U/L (ref 0–45)
BILIRUB DIRECT SERPL-MCNC: <0.2 MG/DL (ref 0–0.3)
BILIRUB SERPL-MCNC: 0.2 MG/DL
BUN SERPL-MCNC: 9.2 MG/DL (ref 6–20)
CALCIUM SERPL-MCNC: 7.7 MG/DL (ref 8.8–10.4)
CHLORIDE SERPL-SCNC: 105 MMOL/L (ref 98–107)
CREAT SERPL-MCNC: 1.04 MG/DL (ref 0.51–0.95)
EGFRCR SERPLBLD CKD-EPI 2021: 64 ML/MIN/1.73M2
ERYTHROCYTE [DISTWIDTH] IN BLOOD BY AUTOMATED COUNT: 13.1 % (ref 10–15)
GLUCOSE SERPL-MCNC: 291 MG/DL (ref 70–99)
HCO3 SERPL-SCNC: 26 MMOL/L (ref 22–29)
HCT VFR BLD AUTO: 37.7 % (ref 35–47)
HGB BLD-MCNC: 12.1 G/DL (ref 11.7–15.7)
MCH RBC QN AUTO: 29.9 PG (ref 26.5–33)
MCHC RBC AUTO-ENTMCNC: 32.1 G/DL (ref 31.5–36.5)
MCV RBC AUTO: 93 FL (ref 78–100)
PLATELET # BLD AUTO: 199 10E3/UL (ref 150–450)
POTASSIUM SERPL-SCNC: 4.4 MMOL/L (ref 3.4–5.3)
PROT SERPL-MCNC: 6.4 G/DL (ref 6.4–8.3)
RBC # BLD AUTO: 4.05 10E6/UL (ref 3.8–5.2)
SODIUM SERPL-SCNC: 137 MMOL/L (ref 135–145)
WBC # BLD AUTO: 5.2 10E3/UL (ref 4–11)

## 2024-10-14 PROCEDURE — 82248 BILIRUBIN DIRECT: CPT

## 2024-10-14 PROCEDURE — 85027 COMPLETE CBC AUTOMATED: CPT

## 2024-10-14 PROCEDURE — 80197 ASSAY OF TACROLIMUS: CPT

## 2024-10-14 PROCEDURE — 36415 COLL VENOUS BLD VENIPUNCTURE: CPT

## 2024-10-14 PROCEDURE — 80053 COMPREHEN METABOLIC PANEL: CPT

## 2024-10-15 LAB
TACROLIMUS BLD-MCNC: 3.4 UG/L (ref 5–15)
TME LAST DOSE: ABNORMAL H
TME LAST DOSE: ABNORMAL H

## 2024-11-19 ENCOUNTER — TELEPHONE (OUTPATIENT)
Dept: TRANSPLANT | Facility: CLINIC | Age: 54
End: 2024-11-19
Payer: COMMERCIAL

## 2024-11-19 NOTE — TELEPHONE ENCOUNTER
Susan called. She saw her GI doc for frequent nausea, he thinks she may have gastroparesis or dumping syndrome and wants to trial metoclopramide 10 mg TID before meals (looks like she has taken it occasionally in the past). Susan wanted to double check that this medication is ok to take. Reviewed adverse effects in UpToDate and found no hepatic problems. She will have labs 12/19 before her visit with Dr. Cook.

## 2024-11-20 DIAGNOSIS — D50.0 IRON DEFICIENCY ANEMIA DUE TO CHRONIC BLOOD LOSS: Primary | ICD-10-CM

## 2024-11-22 ENCOUNTER — APPOINTMENT (OUTPATIENT)
Dept: LAB | Facility: CLINIC | Age: 54
End: 2024-11-22
Attending: STUDENT IN AN ORGANIZED HEALTH CARE EDUCATION/TRAINING PROGRAM
Payer: COMMERCIAL

## 2024-12-02 DIAGNOSIS — Z94.4 LIVER REPLACED BY TRANSPLANT (H): Primary | ICD-10-CM

## 2024-12-02 NOTE — PLAN OF CARE
Patient is alert, oriented x 4, denies pain no nausea, [place] score was # 0. Pt is tolerating diet, voiding and ambulating without difficulty, reports positive flatus and  +BM. Pt on Lactulose protocol.    DERMATOLOGY PROGRESS NOTE  Follow up    12/2/2024  Mai Jones  2003  MRN: [id number]   Last Visit: 10/29/2024      Mai Jones is a 21 year old female patient seen virtually (video visit) in follow up for severe cystic scarring acne. Today patient reports that her acne continues to improve. Has completed 4 months of isotretinoin (30 mg daily x 1 month, skipped a month, then 60 mg x 3 months). Tolerating the medication well, with no mood change, abdominal pain, change in vision or hearing, muscle or joint pain. Otherwise denies lesions that are new, changing, itchy, painful, bleeding, crusting, or otherwise bothersome. Feels otherwise well, no other cutaneous concerns today.     Up to Date on Age Appropriate screenings: Y    Dermatology History  History of skin cancer--Negative  History of other skin issues: HPI    FAMILY HISTORY:  Family history of skin cancer--No family history of skin cancer  Family history of autoimmune disease-- No      HISTORY OF SUN EXPOSURE:  History of severe sunburns--Negative  History of tanning device use: No  Sunscreens: Yes  Work outdoors: No  Occupation: N/A student    SOCIAL:  Lives with: Parents  Smoking/Drinking/Drug use: No/No/No      Family History   Problem Relation Age of Onset    Allergic Rhinitis Mother     Other Father         Allergies    Asthma Father     Asthma Sister     Eczema Sister     Other Sister         Allergies    Migraine Sister     Anxiety disorder Sister         with depression    Urticaria Sister     Heart disease Maternal Grandmother     Hypertension Maternal Grandmother     Hypothyroid Maternal Grandmother     Myocardial Infarction Maternal Grandmother     Cancer, Prostate Maternal Grandfather     Sleep Apnea Maternal Grandfather     Heart disease Maternal Grandfather         stent          Social History     Tobacco Use    Smoking status: Never     Passive exposure: Never    Smokeless tobacco: Never   Vaping Use    Vaping  status: never used       Past Medical History:   Diagnosis Date    Allergic rhinitis     Chronic rhinitis 03/10/2023    Functional constipation     Intermittent asthma with allergic rhinitis (CMD)        ALLERGIES:   Allergen Reactions    Doxycycline PRURITUS       Current Outpatient Medications   Medication Sig Dispense Refill    ISOtretinoin 30 MG capsule Take 2 pills by mouth daily WITH food with some fat (ie peanut butter, ice cream, avocado, etc) 60 capsule 0    metroNIDAZOLE (METROCREAM) 0.75 % cream Apply a thin layer to the entire face twice daily (am, pm) 45 g 11    norethindrone-ethinyl estradiol-FE (JUNEL FE 1/20) 1-20 MG-MCG per tablet Take 1 tablet by mouth daily. 30 tablet 11    cetirizine (ZyrTEC) 10 MG tablet Take 10 mg by mouth daily.      Multiple Vitamin (MULTIVITAMIN ADULT PO) Take 1 tablet by mouth daily.       No current facility-administered medications for this visit.     EXAM: (via photos/video)  The patient is a very pleasant, well developed, Bishop type II  female and is alert and oriented x 3, with a normal mood and affect, and appears to be in no acute distress.    Skin exam (via photos, video)-- A neck up skin exam was performed per patient preference, including the scalp, conjunctiva, lips, ears, and neck. Notable findings were as follows:  - forehead, cheeks, dorsal nose, chin, jaw with scattered inflammatory papules and open and closed comedones --improved  - cheeks w hyperpigmented and erythematous macules and atrophic papules and plaques to forehead, central face--improved  - Rest of exam unremarkable    Labs/Imaging/Outside records: reviewed      IMPRESSION / PLAN:   Ms. Naldo Jones has:    1)  Rosacea fulminans  - dw pt in detail, treatment options along with risks and benefits discussed in detail  - continue isotretinoin 60 mg daily with food that contains fat for best absorption  Pregnancy test --Negative 12/02/2024--DG  Isotretinoin binder kit reviewed with patient  and parent  Understands possible side effects from binder including teratogenicity for females of childbearing potential, possible, but very small risk of ulcerative colitis, depression/suicide.  Patient Enrollment Complete.  Your patient’s REMS ID [id number]  Email: [email or web address]  Patient can obtain their prescription from:  December 02, 2024 - December 08, 2024 (7 - Day Prescription window)  Counseled as far as contraception--2 forms: 1) combination oral contraceptive pill and 2) male latex condoms  --continue OCPs, r/b disc in detail, including blood clot, avoid smoking while taking  Informed consent signed.  Counseled:  Not to donate blood.  Do not drink alcohol  Don't give medication to anyone.  Understands iPLEDGE program.  Use sunscreens, photoprotection and emollients  Stop taking and call office to notify if any issues arise    2)  Post inflammatory hyperpigmentation/post acne scarring  - dw pt, chronic, flaring  - treatment as above to limit additional scarring  - sun protection can help areas of involvement fade more rapidly/prevent worsening  - monitor for changes  - call if changes occur      Return to clinic in:  ~30 days, sooner as needed  The documentation recorded by the scribe Dariela Hurt accurately and completely reflects the service(s) I personally performed and the decisions made by me      Jocelyn Ibarra MD  Dermatology    I, Dariela Hurt MA, attest that I performed the duties of a scribe for this entire encounter in the presence of Dr. Ibarra

## 2024-12-02 NOTE — CONSULTS
Interventional Radiology Consult Service Note    IR consulted for possible TIPS reduction.    This is a 51 year old female with a history of decompensated liver cirrhosis (listed for transplant MELD 20) complicated by hepatic hydrothorax, s/p TIPS 11/5/21 with IR at [facility], DMII, normocytic/macrocytic anemia, thrombocytopenia, depression, hypothyroidism. The patient reportedly presented to [facility] 11/28 with gradual worsening SOB, dry cough, chest pressure consistent with recurrent pleural effusion, underwent thoracentesis 11/29, became more lethargic 12/2 concerning for hepatic encephalopathy, started on cefepime and flagyl empirically and increased lactulose and transfer to [facility] 12/6 for ongoing management. US of TIPS shows patent shunt. IR is consulted for consideration of TIPS reduction.    Case and imaging was reviewed with Dr. Storey from IR. TIPS placed 11/5 for recurrent hepatic hydrothorax requiring weekly thoracentesis. Portosystemic gradient 7 mmHg. Patient able to relay fairly accurate history of events at bedside today. Endorses taking medications as prescribed prior to admission at OSH but decrease in frequency of bowel movements (1-2 per day) prior to episode of HE. She endorses continued improvement in mentation since transfer and bowel movements 3-4 per day. TIPS appears to be functioning as intended. Pt has required 2 thoras since 11/5. Gradient of 7 mmHg is ideal for indication of hepatic hydrothorax. Risk with narrowing TIPS is that effusion will return. Additionally, the shunt may thrombose if flow is diminished. Understand there is a balance with medication management, electrolytes, and renal function. Would recommend proceeding conservatively and trying to avoid TIPS narrowing if possible. If medical management fails to control HE this procedure can be considered in the future.    Recommendations were reviewed with Shelby Vicente NP and Dr. Storey plans to discuss directly with  Dr. Guteirrez. Patient was agreeable to IR recommendations at time of bedside consultation.    Vitals:   /75 (BP Location: Left arm)   Pulse 71   Temp 97.5  F (36.4  C) (Axillary)   Resp 16   Wt 72 kg (158 lb 11.2 oz)   SpO2 98%   BMI 24.86 kg/m      Pertinent Labs:     Lab Results   Component Value Date    WBC 5.6 12/08/2021    WBC 5.7 12/07/2021    WBC 6.6 12/07/2021    WBC 4.0 07/05/2021    WBC 3.3 (L) 05/18/2021    WBC 4.6 03/19/2021       Lab Results   Component Value Date    HGB 9.5 12/08/2021    HGB 10.1 12/07/2021    HGB 10.0 12/07/2021    HGB 11.5 07/05/2021    HGB 11.3 05/18/2021    HGB 11.4 03/19/2021       Lab Results   Component Value Date    PLT 61 12/08/2021    PLT 66 12/07/2021    PLT 65 12/07/2021     07/05/2021     05/18/2021     03/19/2021       Lab Results   Component Value Date    INR 2.22 (H) 12/08/2021    INR 1.18 (H) 07/05/2021    PTT 32 11/22/2020       Lab Results   Component Value Date    POTASSIUM 3.6 12/08/2021    POTASSIUM 3.6 12/08/2021    POTASSIUM 3.0 (L) 07/05/2021        CAIN Conley CNP  Interventional Radiology   Pager: [phone]     None

## 2024-12-19 DIAGNOSIS — Z94.4 LIVER REPLACED BY TRANSPLANT (H): Primary | ICD-10-CM

## 2024-12-19 NOTE — PROGRESS NOTES
Nandini message received from Susan. She had to cancel appointment with Dr. Cook today because she is sick. She would like to get rescheduled with Dr. Cook as soon as possible. Let her know that Dr. Cook is booking out at least a couple months but that next available is fine as her liver doing great. Susan saw Dr. Cook in June 2024.     Appt request placed.

## 2025-01-25 ENCOUNTER — HEALTH MAINTENANCE LETTER (OUTPATIENT)
Age: 55
End: 2025-01-25

## 2025-02-12 DIAGNOSIS — Z94.4 LIVER REPLACED BY TRANSPLANT (H): ICD-10-CM

## 2025-02-12 RX ORDER — TACROLIMUS 1 MG/1
CAPSULE ORAL
Qty: 270 CAPSULE | Refills: 3 | Status: SHIPPED | OUTPATIENT
Start: 2025-02-12

## 2025-02-26 ENCOUNTER — LAB (OUTPATIENT)
Dept: LAB | Facility: CLINIC | Age: 55
End: 2025-02-26
Payer: COMMERCIAL

## 2025-02-26 DIAGNOSIS — Z94.4 LIVER REPLACED BY TRANSPLANT (H): ICD-10-CM

## 2025-02-26 LAB
ALBUMIN SERPL BCG-MCNC: 3.5 G/DL (ref 3.5–5.2)
ALP SERPL-CCNC: 124 U/L (ref 40–150)
ALT SERPL W P-5'-P-CCNC: 36 U/L (ref 0–50)
ANION GAP SERPL CALCULATED.3IONS-SCNC: 8 MMOL/L (ref 7–15)
AST SERPL W P-5'-P-CCNC: 35 U/L (ref 0–45)
BILIRUB DIRECT SERPL-MCNC: 0.16 MG/DL (ref 0–0.3)
BILIRUB SERPL-MCNC: 0.3 MG/DL
BUN SERPL-MCNC: 17.2 MG/DL (ref 6–20)
CALCIUM SERPL-MCNC: 7.5 MG/DL (ref 8.8–10.4)
CHLORIDE SERPL-SCNC: 110 MMOL/L (ref 98–107)
CREAT SERPL-MCNC: 1.1 MG/DL (ref 0.51–0.95)
EGFRCR SERPLBLD CKD-EPI 2021: 60 ML/MIN/1.73M2
ERYTHROCYTE [DISTWIDTH] IN BLOOD BY AUTOMATED COUNT: 12.4 % (ref 10–15)
GLUCOSE SERPL-MCNC: 156 MG/DL (ref 70–99)
HCO3 SERPL-SCNC: 23 MMOL/L (ref 22–29)
HCT VFR BLD AUTO: 36.7 % (ref 35–47)
HGB BLD-MCNC: 11.8 G/DL (ref 11.7–15.7)
MCH RBC QN AUTO: 29.4 PG (ref 26.5–33)
MCHC RBC AUTO-ENTMCNC: 32.2 G/DL (ref 31.5–36.5)
MCV RBC AUTO: 92 FL (ref 78–100)
PLATELET # BLD AUTO: 141 10E3/UL (ref 150–450)
POTASSIUM SERPL-SCNC: 5 MMOL/L (ref 3.4–5.3)
PROT SERPL-MCNC: 6.5 G/DL (ref 6.4–8.3)
RBC # BLD AUTO: 4.01 10E6/UL (ref 3.8–5.2)
SODIUM SERPL-SCNC: 141 MMOL/L (ref 135–145)
TACROLIMUS BLD-MCNC: 4.5 UG/L (ref 5–15)
TME LAST DOSE: ABNORMAL H
TME LAST DOSE: ABNORMAL H
WBC # BLD AUTO: 4.4 10E3/UL (ref 4–11)

## 2025-02-26 PROCEDURE — 80053 COMPREHEN METABOLIC PANEL: CPT

## 2025-02-26 PROCEDURE — 82248 BILIRUBIN DIRECT: CPT

## 2025-02-26 PROCEDURE — 36415 COLL VENOUS BLD VENIPUNCTURE: CPT

## 2025-02-26 PROCEDURE — 85027 COMPLETE CBC AUTOMATED: CPT

## 2025-02-26 PROCEDURE — 80197 ASSAY OF TACROLIMUS: CPT

## 2025-04-21 ENCOUNTER — TELEPHONE (OUTPATIENT)
Dept: TRANSPLANT | Facility: CLINIC | Age: 55
End: 2025-04-21
Payer: COMMERCIAL

## 2025-04-21 NOTE — TELEPHONE ENCOUNTER
Susan called. She has a sinus and ear infection and a cough that she is on antibiotics for and has an appt with Dr Cook in the morning. Advised her to come to the appt but to wear a mask. She missed her last appt due to being sick too.

## 2025-04-22 ENCOUNTER — OFFICE VISIT (OUTPATIENT)
Dept: GASTROENTEROLOGY | Facility: CLINIC | Age: 55
End: 2025-04-22
Attending: INTERNAL MEDICINE
Payer: COMMERCIAL

## 2025-04-22 ENCOUNTER — LAB (OUTPATIENT)
Dept: LAB | Facility: CLINIC | Age: 55
End: 2025-04-22
Payer: COMMERCIAL

## 2025-04-22 VITALS
RESPIRATION RATE: 18 BRPM | DIASTOLIC BLOOD PRESSURE: 87 MMHG | WEIGHT: 150.2 LBS | HEART RATE: 69 BPM | OXYGEN SATURATION: 99 % | SYSTOLIC BLOOD PRESSURE: 125 MMHG | HEIGHT: 66 IN | BODY MASS INDEX: 24.14 KG/M2

## 2025-04-22 DIAGNOSIS — Z94.4 LIVER REPLACED BY TRANSPLANT (H): Primary | ICD-10-CM

## 2025-04-22 DIAGNOSIS — Z94.4 LIVER REPLACED BY TRANSPLANT (H): ICD-10-CM

## 2025-04-22 DIAGNOSIS — E83.51 HYPOCALCEMIA: ICD-10-CM

## 2025-04-22 PROCEDURE — G0463 HOSPITAL OUTPT CLINIC VISIT: HCPCS | Performed by: INTERNAL MEDICINE

## 2025-04-22 PROCEDURE — 1126F AMNT PAIN NOTED NONE PRSNT: CPT | Performed by: INTERNAL MEDICINE

## 2025-04-22 PROCEDURE — 99215 OFFICE O/P EST HI 40 MIN: CPT | Performed by: INTERNAL MEDICINE

## 2025-04-22 PROCEDURE — 3079F DIAST BP 80-89 MM HG: CPT | Performed by: INTERNAL MEDICINE

## 2025-04-22 PROCEDURE — 3074F SYST BP LT 130 MM HG: CPT | Performed by: INTERNAL MEDICINE

## 2025-04-22 ASSESSMENT — PAIN SCALES - GENERAL: PAINLEVEL_OUTOF10: NO PAIN (0)

## 2025-04-22 NOTE — PROGRESS NOTES
Solid Organ Transplant Hepatology Follow-Up Visit:     HISTORY OF PRESENT ILLNESS:   I had the pleasure of seeing Susan Puckett for followup in the Liver Clinic at the [facility] on April 22, 2025. Ms. 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     Overall, she is doing fairly well.  She does note some mild zelda-incisional abdominal discomfort.  She does note some itching without a skin rash.  She does have fatigue. She denies any increased abdominal girth.  She denies lower extremity edema.     She denies any fevers or chills.  She has a cough now related to sinusitis.  She denies shortness of breath.  She does have nausea without vomiting.  She is using Zofran and Reglan.  She does have occasional increased loose stools in the morning.  She reports occasional oil in the toilet bowl.  Her appetite has been so-so, but her weight is stable.     Otherwise, there have been no other new events since she was last seen.    Medications:   Current Outpatient Medications   Medication Sig Dispense Refill    acetaminophen (TYLENOL) 500 MG tablet Take 1 tablet (500 mg) by mouth 4 times daily (before meals and nightly) (Patient taking differently: Take 500 mg by mouth as needed.) 60 tablet 0    alendronate (FOSAMAX) 70 MG tablet Take 1 tablet (70 mg) by mouth every 7 days 13 tablet 3    BD PEN NEEDLE GRISEL 2ND GEN 32G X 4 MM miscellaneous INJECT SUBCUTANEOUSLY 4 TIMES A DAY.      blood glucose (NO BRAND SPECIFIED) test strip USE 1 EACH TO TEST TWO TIMES A DAY.      blood glucose monitoring (SOFTCLIX) lancets USE 1 EACH TO TEST TWO TIMES A DAY.      Blood Glucose Monitoring Suppl (ACCU-CHEK GUIDE) w/Device KIT USE AS DIRECTED TO MONITOR GLUCOSE      calcium carbonate-vitamin D (OSCAL) 500-5 MG-MCG tablet Take 1 tablet by mouth 2 times daily      COMPOUND CONTAINING CONTROLLED SUBSTANCE (CMPD RX) - PHARMACY TO  MIX COMPOUNDED MEDICATION 5mg of valium cream to be used vaginally every night 1 g 0    Continuous Glucose Sensor (DEXCOM G7 SENSOR) MISC CHANGE EVERY 10 DAYS      cyanocobalamin (VITAMIN B-12) 1000 MCG tablet Take 1,000 mcg by mouth every 7 days on Wednesdays      escitalopram (LEXAPRO) 20 MG tablet Take 30 mg by mouth daily      folic acid (FOLVITE) 1 MG tablet Take 1 mg by mouth every morning      Glucagon (BAQSIMI ONE PACK) 3 MG/DOSE nasal powder Spray 1 spray in nostril as needed.      HIGH ABSORPTION MAGNESIUM 100 MG TABS TAKE TWO TABLETS BY MOUTH TWICE A  tablet 3    hydrOXYzine HCl (ATARAX) 25 MG tablet Take 25 mg by mouth every 8 hours as needed.      insulin lispro (HUMALOG KWIKPEN) 100 UNIT/ML (1 unit dial) KWIKPEN Inject subcutaneously 4 times daily.      levothyroxine (SYNTHROID/LEVOTHROID) 125 MCG tablet Take 125 mcg by mouth daily      magnesium glycinate 100 MG CAPS capsule Take 200 mg by mouth 2 times daily.      melatonin 5 MG tablet Take 5 mg by mouth At Bedtime      metoclopramide (REGLAN) 5 MG tablet Take 1 tablet (5 mg) by mouth 3 times daily as needed (Patient taking differently: Take 10 mg by mouth 3 times daily (before meals).) 100 tablet 3    naratriptan (AMERGE) 2.5 MG tablet Take 2.5 mg by mouth at onset of headache for migraine      ondansetron (ZOFRAN ODT) 8 MG ODT tab Take 1 tablet (8 mg) by mouth every 8 hours as needed for nausea 30 tablet 0    prochlorperazine (COMPAZINE) 10 MG tablet Take 5 mg by mouth every 8 hours as needed.      sitagliptin (JANUVIA) 100 MG tablet Take 100 mg by mouth daily.      tacrolimus (GENERIC EQUIVALENT) 1 MG capsule Take 2 capsules (2 mg) by mouth every morning AND 1 capsule (1 mg) every evening. 270 capsule 3    topiramate (TOPAMAX) 50 MG tablet Take 100 mg by mouth at bedtime      traZODone (DESYREL) 100 MG tablet Take  mg by mouth nightly as needed.      triamcinolone (KENALOG) 0.1 % external cream Apply sparingly twice daily as needed to  "rash on shoulders as needed. (Patient taking differently: Apply topically as needed. Apply sparingly twice daily as needed to rash on shoulders as needed.) 45 g 3    gabapentin (NEURONTIN) 100 MG capsule Take 3 capsules (300 mg) by mouth 3 times daily for 30 days 270 capsule 0     No current facility-administered medications for this visit.      Vitals:   /87   Pulse 69   Resp 18   Ht 1.676 m (5' 6\")   Wt 68.1 kg (150 lb 3.2 oz)   SpO2 99%   BMI 24.24 kg/m      Physical Exam:   In general she looks quite well. HEENT exam shows no scleral icterus or temporal muscle wasting. Chest is clear. Abdominal exam shows no increase in girth. No masses or tenderness to palpation are present. Liver is 10 cm in span without left lobe enlargement. No spleen tip is palpable.  Her incision is intact.  Extremity exam shows no edema. Skin exam shows no suspicious lesions and neurologic exam is nonfocal.     Labs:   Lab Results   Component Value Date     02/26/2025    POTASSIUM 5.0 02/26/2025    CHLORIDE 110 (H) 02/26/2025    ANIONGAP 8 02/26/2025    CO2 23 02/26/2025    BUN 17.2 02/26/2025    CR 1.10 (H) 02/26/2025    GFRESTIMATED 60 (L) 02/26/2025    MAURI 7.5 (L) 02/26/2025      Lab Results   Component Value Date    WBC 4.4 02/26/2025    HGB 11.8 02/26/2025    HCT 36.7 02/26/2025    MCV 92 02/26/2025    MCH 29.4 02/26/2025    MCHC 32.2 02/26/2025    RDW 12.4 02/26/2025     (L) 02/26/2025     Lab Results   Component Value Date    ALBUMIN 3.5 02/26/2025    ALKPHOS 124 02/26/2025    AST 35 02/26/2025     Lab Results   Component Value Date    INR 1.32 (H) 11/04/2022     Recent Labs   Lab Test 02/26/25  0831 11/22/24  1209 10/14/24  1426 08/21/24  1009 06/18/24  0814 05/01/24  0848 03/06/24  0547 01/24/24  0846 12/18/23  1401 12/04/23  0902   ALKPHOS 124 101 137 109 97 108 117 101 101 99   ALT 36 12 24 14 <5 22 7 9 20 19   AST 35 24 26 27 17 36 15 23 31 26      Imaging:   No images are attached to the encounter. "     Assessment/Plan:   IMPRESSION:   My impression is that Ms. Puckett is doing fairly well status post liver transplantation.  Her hemolytic anemia has completely resolved.  I am not what her nausea is related to.  She is scheduled for a colonoscopy later this summer and I have recommended she get an upper GI endoscopy as well.  The good news is it is not affecting her weight.    I am concerned that she may have pancreatic insufficiency.  She had a CT scan in November which showed an atrophic appearing pancreas.  She also is reporting what sounds like steatorrhea.  Interestingly, she also reports her diabetes is quite brittle.  I do not think all this would be related to her gastric bypass but I will check her stool for fecal elastase as well as qualitative fecal fat.  I also did note that she has a low serum calcium and have checked an ionized calcium.  Her vitamin D levels were generally quite low and I have recommended she go on replacement which it appears that she has not been on other than a small amount that send her calcium tablets.     She has seen the dermatologist already and is otherwise up to date on vaccines as well.  My plan will be to see her back in the clinic in 6 months.       I did spend a total of 40 minutes (on the date of the encounter), including 30 minutes of face-to-face clinic time including counseling. The rest of the time was spent in documentation and review of records.    Thank you very much for allowing me to participate in the care of this patient.  If you have any questions regarding my recommendations, please do not hesitate to contact me.         Raphael Cook MD      Professor of Medicine  [employer]      Executive Medical Director, Solid Organ Transplant Program  [facility]

## 2025-04-22 NOTE — NURSING NOTE
"Chief Complaint   Patient presents with    RECHECK     Annual follow up visit     Aquilino Nelson, CMA CMA at 8:36 AM on 4/22/2025     /87   Pulse 69   Resp 18   Ht 1.676 m (5' 6\")   Wt 68.1 kg (150 lb 3.2 oz)   SpO2 99%   BMI 24.24 kg/m      "

## 2025-04-22 NOTE — LETTER
4/22/2025      Susan Puckett  [address] [address]      Dear Colleague,    Thank you for referring your patient, Susan Puckett, to the [facility] [place]. Please see a copy of my visit note below.    Solid Organ Transplant Hepatology Follow-Up Visit:     HISTORY OF PRESENT ILLNESS:   I had the pleasure of seeing Susan Puckett for followup in the Liver Clinic at the [facility] on April 22, 2025. Ms. 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     Overall, she is doing fairly well.  She does note some mild zelda-incisional abdominal discomfort.  She does note some itching without a skin rash.  She does have fatigue. She denies any increased abdominal girth.  She denies lower extremity edema.     She denies any fevers or chills.  She has a cough now related to sinusitis.  She denies shortness of breath.  She does have nausea without vomiting.  She is using Zofran and Reglan.  She does have occasional increased loose stools in the morning.  She reports occasional oil in the toilet bowl.  Her appetite has been so-so, but her weight is stable.     Otherwise, there have been no other new events since she was last seen.    Medications:   Current Outpatient Medications   Medication Sig Dispense Refill     acetaminophen (TYLENOL) 500 MG tablet Take 1 tablet (500 mg) by mouth 4 times daily (before meals and nightly) (Patient taking differently: Take 500 mg by mouth as needed.) 60 tablet 0     alendronate (FOSAMAX) 70 MG tablet Take 1 tablet (70 mg) by mouth every 7 days 13 tablet 3     BD PEN NEEDLE GRISEL 2ND GEN 32G X 4 MM miscellaneous INJECT SUBCUTANEOUSLY 4 TIMES A DAY.       blood glucose (NO BRAND SPECIFIED) test strip USE 1 EACH TO TEST TWO TIMES A DAY.       blood glucose monitoring (SOFTCLIX) lancets USE 1 EACH TO TEST TWO TIMES A DAY.       Blood  Glucose Monitoring Suppl (ACCU-CHEK GUIDE) w/Device KIT USE AS DIRECTED TO MONITOR GLUCOSE       calcium carbonate-vitamin D (OSCAL) 500-5 MG-MCG tablet Take 1 tablet by mouth 2 times daily       COMPOUND CONTAINING CONTROLLED SUBSTANCE (CMPD RX) - PHARMACY TO MIX COMPOUNDED MEDICATION 5mg of valium cream to be used vaginally every night 1 g 0     Continuous Glucose Sensor (DEXCOM G7 SENSOR) MISC CHANGE EVERY 10 DAYS       cyanocobalamin (VITAMIN B-12) 1000 MCG tablet Take 1,000 mcg by mouth every 7 days on Wednesdays       escitalopram (LEXAPRO) 20 MG tablet Take 30 mg by mouth daily       folic acid (FOLVITE) 1 MG tablet Take 1 mg by mouth every morning       Glucagon (BAQSIMI ONE PACK) 3 MG/DOSE nasal powder Spray 1 spray in nostril as needed.       HIGH ABSORPTION MAGNESIUM 100 MG TABS TAKE TWO TABLETS BY MOUTH TWICE A  tablet 3     hydrOXYzine HCl (ATARAX) 25 MG tablet Take 25 mg by mouth every 8 hours as needed.       insulin lispro (HUMALOG KWIKPEN) 100 UNIT/ML (1 unit dial) KWIKPEN Inject subcutaneously 4 times daily.       levothyroxine (SYNTHROID/LEVOTHROID) 125 MCG tablet Take 125 mcg by mouth daily       magnesium glycinate 100 MG CAPS capsule Take 200 mg by mouth 2 times daily.       melatonin 5 MG tablet Take 5 mg by mouth At Bedtime       metoclopramide (REGLAN) 5 MG tablet Take 1 tablet (5 mg) by mouth 3 times daily as needed (Patient taking differently: Take 10 mg by mouth 3 times daily (before meals).) 100 tablet 3     naratriptan (AMERGE) 2.5 MG tablet Take 2.5 mg by mouth at onset of headache for migraine       ondansetron (ZOFRAN ODT) 8 MG ODT tab Take 1 tablet (8 mg) by mouth every 8 hours as needed for nausea 30 tablet 0     prochlorperazine (COMPAZINE) 10 MG tablet Take 5 mg by mouth every 8 hours as needed.       sitagliptin (JANUVIA) 100 MG tablet Take 100 mg by mouth daily.       tacrolimus (GENERIC EQUIVALENT) 1 MG capsule Take 2 capsules (2 mg) by mouth every morning AND 1 capsule  "(1 mg) every evening. 270 capsule 3     topiramate (TOPAMAX) 50 MG tablet Take 100 mg by mouth at bedtime       traZODone (DESYREL) 100 MG tablet Take  mg by mouth nightly as needed.       triamcinolone (KENALOG) 0.1 % external cream Apply sparingly twice daily as needed to rash on shoulders as needed. (Patient taking differently: Apply topically as needed. Apply sparingly twice daily as needed to rash on shoulders as needed.) 45 g 3     gabapentin (NEURONTIN) 100 MG capsule Take 3 capsules (300 mg) by mouth 3 times daily for 30 days 270 capsule 0     No current facility-administered medications for this visit.      Vitals:   /87   Pulse 69   Resp 18   Ht 1.676 m (5' 6\")   Wt 68.1 kg (150 lb 3.2 oz)   SpO2 99%   BMI 24.24 kg/m      Physical Exam:   In general she looks quite well. HEENT exam shows no scleral icterus or temporal muscle wasting. Chest is clear. Abdominal exam shows no increase in girth. No masses or tenderness to palpation are present. Liver is 10 cm in span without left lobe enlargement. No spleen tip is palpable.  Her incision is intact.  Extremity exam shows no edema. Skin exam shows no suspicious lesions and neurologic exam is nonfocal.     Labs:   Lab Results   Component Value Date     02/26/2025    POTASSIUM 5.0 02/26/2025    CHLORIDE 110 (H) 02/26/2025    ANIONGAP 8 02/26/2025    CO2 23 02/26/2025    BUN 17.2 02/26/2025    CR 1.10 (H) 02/26/2025    GFRESTIMATED 60 (L) 02/26/2025    MAURI 7.5 (L) 02/26/2025      Lab Results   Component Value Date    WBC 4.4 02/26/2025    HGB 11.8 02/26/2025    HCT 36.7 02/26/2025    MCV 92 02/26/2025    MCH 29.4 02/26/2025    MCHC 32.2 02/26/2025    RDW 12.4 02/26/2025     (L) 02/26/2025     Lab Results   Component Value Date    ALBUMIN 3.5 02/26/2025    ALKPHOS 124 02/26/2025    AST 35 02/26/2025     Lab Results   Component Value Date    INR 1.32 (H) 11/04/2022     Recent Labs   Lab Test 02/26/25  0831 11/22/24  1209 10/14/24  1426 " 08/21/24  1009 06/18/24  0814 05/01/24  0848 03/06/24  0547 01/24/24  0846 12/18/23  1401 12/04/23  0902   ALKPHOS 124 101 137 109 97 108 117 101 101 99   ALT 36 12 24 14 <5 22 7 9 20 19   AST 35 24 26 27 17 36 15 23 31 26      Imaging:   No images are attached to the encounter.     Assessment/Plan:   IMPRESSION:   My impression is that Ms. Puckett is doing fairly well status post liver transplantation.  Her hemolytic anemia has completely resolved.  I am not what her nausea is related to.  She is scheduled for a colonoscopy later this summer and I have recommended she get an upper GI endoscopy as well.  The good news is it is not affecting her weight.    I am concerned that she may have pancreatic insufficiency.  She had a CT scan in November which showed an atrophic appearing pancreas.  She also is reporting what sounds like steatorrhea.  Interestingly, she also reports her diabetes is quite brittle.  I do not think all this would be related to her gastric bypass but I will check her stool for fecal elastase as well as qualitative fecal fat.  I also did note that she has a low serum calcium and have checked an ionized calcium.  Her vitamin D levels were generally quite low and I have recommended she go on replacement which it appears that she has not been on other than a small amount that send her calcium tablets.     She has seen the dermatologist already and is otherwise up to date on vaccines as well.  My plan will be to see her back in the clinic in 6 months.       I did spend a total of 40 minutes (on the date of the encounter), including 30 minutes of face-to-face clinic time including counseling. The rest of the time was spent in documentation and review of records.    Thank you very much for allowing me to participate in the care of this patient.  If you have any questions regarding my recommendations, please do not hesitate to contact me.         Raphael Coko MD      Professor of Medicine  [employer]      Executive Medical Director, Solid Organ Transplant Program  [facility]      Again, thank you for allowing me to participate in the care of your patient.        Sincerely,        Raphael Cook MD    Electronically signed

## 2025-05-03 ENCOUNTER — HEALTH MAINTENANCE LETTER (OUTPATIENT)
Age: 55
End: 2025-05-03

## 2025-05-04 DIAGNOSIS — M81.8 OTHER OSTEOPOROSIS, UNSPECIFIED PATHOLOGICAL FRACTURE PRESENCE: ICD-10-CM

## 2025-05-04 DIAGNOSIS — Z94.4 LIVER REPLACED BY TRANSPLANT (H): ICD-10-CM

## 2025-05-05 RX ORDER — ALENDRONATE SODIUM 70 MG/1
70 TABLET ORAL
Qty: 12 TABLET | Refills: 3 | Status: SHIPPED | OUTPATIENT
Start: 2025-05-05

## 2025-05-07 NOTE — LETTER
12/8/2022         RE: Susan Puckett  [address] [facility] [address]        Dear Colleague,    Thank you for referring your patient, Susan Puckett, to the [facility]. Please see a copy of my visit note below.    Transplant Surgery Progress Note    Transplants:  11/2/2022 (Liver); Postoperative day:  36  S: overall doing better, recent hospitalization for headache, N/V now improved    Transplant History:    Transplant Type:  Liver Tx  Donor Type:    Transplant Date:  11/2/2022 (Liver)   Biliary Stent:  No       Acute Rejection Hx:  No    Present Maintenance Immunosuppression:  Tacrolimus and Mycophenolate mofetil    CMV IgG Ab Discordance:  No  EBV IgG Ab Discordance:  No    Transplant Coordinator: Edwina Fallon     Transplant Office Phone Number: [phone]     Immunosuppressant Medications     Immunosuppressive Agents Disp Start End     mycophenolate (GENERIC EQUIVALENT) 250 MG capsule    180 capsule 12/6/2022     Sig - Route: Take 3 capsules (750 mg) by mouth two times daily - Oral    Class: E-Prescribe    Renewals     Renewal requests to authorizing provider (Bhavana Romo NP) <b>prohibited</b>           tacrolimus (GENERIC EQUIVALENT) 1 MG capsule    360 capsule 12/6/2022     Sig - Route: Take 6 capsules (6 mg) by mouth two times daily - Oral    Class: E-Prescribe          Possible Immunosuppression-related side effects:   []             headache  []             vivid dreams  []             irritability  []             cognitive difficuties  []             fine tremor  []             nausea  []             diarrhea  []             neuropathy      []             edema  []             renal calcineurin toxicity  []             hyperkalemia  []             post-transplant diabetes  []             decreased appetite  []             increased appetite  []             other:  []             none    Prescription Medications as of 12/8/2022       Rx Number Disp  Take medication as prescribed.  Return immediately if any new or worsening symptoms.  Thank you for allowing us to be a part of your care.     Refills Start End Last Dispensed Date Next Fill Date Owning Pharmacy    acetaminophen (TYLENOL) 325 MG tablet  100 tablet 0 12/6/2022    [place], [place] - [address]    Sig: Take 3 tablets (975 mg) by mouth every 8 hours as needed for mild pain    Class: E-Prescribe    Route: Oral    amylase-lipase-protease (CREON 24) 02909-93361 units CPEP per EC capsule  180 capsule 3 11/18/2022    [place] [facility]     Sig: Take 2 capsules by mouth 3 times daily (with meals)    Class: E-Prescribe    Route: Oral    amylase-lipase-protease (CREON 24) 19507-01788 units CPEP per EC capsule  90 capsule 3 11/18/2022    [place] [facility]     Sig: Take 1 capsule by mouth Take with snacks or supplements (with snacks)    Class: E-Prescribe    Route: Oral    aspirin (ASA) 81 MG chewable tablet  90 tablet 3 11/18/2022    [place] [facility]     Sig: Take 1 tablet (81 mg) by mouth daily    Class: E-Prescribe    Route: Oral    calcium carbonate-vitamin D (OSCAL) 500-5 MG-MCG tablet    12/1/2022        Sig: Take 1 tablet by mouth 2 times daily    Class: Historical    Route: Oral    capsaicin 0.035 % CREA            Sig: Externally apply topically 3 times daily    Class: Historical    Route: Apply externally    Continuous Blood Gluc  (DEXCOM G6 ) LUNA    6/1/2021        Sig: Use as directed for continuous glucose monitoring.    Class: Historical    Continuous Blood Gluc Sensor (DEXCOM G6 SENSOR) MISC    6/1/2021        Sig: Use as directed for continuous glucose monitoring.  Change sensor every 10 days.    Class: Historical    Continuous Blood Gluc Transmit (DEXCOM G6 TRANSMITTER) MISC    6/1/2021        Sig: Use as directed for continuous glucose monitoring.  Change every 3 months.    Class: Historical    cyanocobalamin (VITAMIN B-12)  1000 MCG tablet    9/2/2020    [facility] [id number] [place] [id number] [facility]    Sig: Take 1,000 mcg by mouth every 7 days Take 1 tablet by mouth every 7 days on Wednesdays    Class: Historical    Route: Oral    cyclobenzaprine (FLEXERIL) 10 MG tablet  30 tablet 0 12/6/2022    [facility] [place], [place] - [address] [facility] [address]    Sig: Take 1 tablet (10 mg) by mouth every 8 hours as needed for muscle spasms    Class: E-Prescribe    Route: Oral    Renewals     Renewal requests to authorizing provider (Bhavana Romo NP) <b>prohibited</b>          dapsone (ACZONE) 25 MG tablet  60 tablet 4 12/6/2022    [place] [facility] [place] [address] [address]    Sig: Take 2 tablets (50 mg) by mouth daily    Class: E-Prescribe    Route: Oral    Renewals     Renewal requests to authorizing provider (Bhavana Romo NP) <b>prohibited</b>          escitalopram (LEXAPRO) 20 MG tablet    11/28/2022        Sig: Take 10 mg by mouth daily    Class: Historical    Route: Oral    folic acid (FOLVITE) 1 MG tablet    2/26/2020    [facility] [id number] [place] [address]    Sig: Take 1 mg by mouth every morning    Class: Historical    Route: Oral    gabapentin (NEURONTIN) 100 MG capsule  90 capsule 3 11/23/2022    [facility] [place] [address] [address]    Sig: Take 2 capsules (200 mg) by mouth 3 times daily    Class: E-Prescribe    Route: Oral    insulin aspart (NOVOLOG VIAL) 100 UNITS/ML vial    11/16/2022        Sig: Inject 1-10 Units Subcutaneous 4 times daily (with meals and nightly)    Class: No Print Out    Notes to Pharmacy: 0.5 unit(s) per 14g cho AC breakfast/lunch AND 0.5 unit(s) per 20 g cho for dinner AND NO meal insulin after 2000 hrs.  Snack coverage:   0800 - 1700 - 0.5 unit(s) per 14 g cho and 1701 - 2000 0.5 unit(s) per 20 g cho    Route: Subcutaneous    insulin glargine (LANTUS PEN) 100 UNIT/ML pen     12/6/2022        Sig: Inject 11 Units Subcutaneous every morning (before breakfast)    Class: No Print Out    Notes to Pharmacy: If Lantus is not covered by insurance, may substitute Basaglar or Semglee or other insulin glargine product per insurance preference at same dose and frequency.      Route: Subcutaneous    insulin pen needle (BD GRISEL U/F) 32G X 4 MM miscellaneous    6/1/2021        Sig: Inject 1 each Subcutaneous    Class: Historical    Route: Subcutaneous    levothyroxine (SYNTHROID/LEVOTHROID) 150 MCG tablet    5/23/2022        Sig: Take 1 tablet by mouth daily    Class: Historical    Route: Oral    LORazepam (ATIVAN) 0.5 MG tablet    11/28/2022        Sig: Take 0.5 mg by mouth nightly as needed For anxiety    Class: Historical    Route: Oral    magnesium oxide (MAG-OX) 400 MG tablet  90 tablet 11 12/1/2022    [facility], [place] - [facility] [address]    Sig: Take 1 tablet (400 mg) by mouth 3 times daily    Class: E-Prescribe    Route: Oral    melatonin 5 MG tablet            Sig: Take 5 mg by mouth At Bedtime    Class: Historical    Route: Oral    multivitamin CF FORMULA (DEKAS PLUS) capsule  90 capsule 3 3/1/2022    [facility] [id number] [place] [address]    Sig: Take 1 capsule by mouth daily    Class: E-Prescribe    Route: Oral    mycophenolate (GENERIC EQUIVALENT) 250 MG capsule  180 capsule 11 12/6/2022    [facility]    Sig: Take 3 capsules (750 mg) by mouth two times daily    Class: E-Prescribe    Route: Oral    Renewals     Renewal requests to authorizing provider (Bhavana Romo, NP) <b>prohibited</b>          ondansetron (ZOFRAN ODT) 8 MG ODT tab  30 tablet 0 12/6/2022    [facility] [place], [place] - [address] [address]    Sig: Take 1 tablet (8 mg) by mouth every 8 hours as needed for nausea    Class: E-Prescribe    Route: Oral    polyethylene glycol  (MIRALAX) 17 GM/Dose powder  510 g  11/10/2022        Sig: Take 17 g by mouth daily    Class: Transitional    Route: Oral    rizatriptan (MAXALT) 10 MG tablet    11/28/2022        Sig: Take 10 mg by mouth as needed Take 1 Tablet (10 mg) by mouth as needed for Headache. at onset of migraine. May repeat in 2 hr if needed up to 30mg in 24 hr & MAX use 5 days/month. Do not use within 24 hours of an ergotamine preparation or a different triptan.    Class: Historical    Route: Oral    simethicone (MYLICON) 80 MG chewable tablet  120 tablet 0 11/17/2022 12/17/2022   [facility] [address]    Sig: Take 1 tablet (80 mg) by mouth every 6 hours as needed for cramping    Class: E-Prescribe    Route: Oral    sitagliptin (JANUVIA) 100 MG tablet  30 tablet 0 11/17/2022    [place] [address]    Sig: Take 1 tablet (100 mg) by mouth daily    Class: E-Prescribe    Route: Oral    tacrolimus (GENERIC EQUIVALENT) 1 MG capsule  360 capsule 11 12/6/2022    [facility], [place] - [address]    Sig: Take 6 capsules (6 mg) by mouth two times daily    Class: E-Prescribe    Route: Oral    topiramate (TOPAMAX) 25 MG tablet  90 tablet 2 12/6/2022    [place], [place] - [address] [facility] [address]    Sig: Take 3 tablets (75 mg) by mouth At Bedtime    Class: E-Prescribe    Route: Oral    Renewals     Renewal requests to authorizing provider (Bhavana Romo NP) <b>prohibited</b>          traZODone (DESYREL) 50 MG tablet    11/28/2022        Sig: Take 50 mg by mouth At Bedtime    Class: Historical    Route: Oral    valGANciclovir (VALCYTE) 450 MG tablet  90 tablet 3 11/18/2022    [place] [facility] [address] [address]    Sig: Take 1 tablet (450 mg) by mouth daily    Class: E-Prescribe    Route: Oral          O:   [unfilled]    Transplant Immunosuppression Labs Latest  Ref Rng & Units 12/8/2022 12/8/2022 12/6/2022 12/5/2022 12/4/2022   Creat 0.51 - 0.95 mg/dL - 1.06(H) 1.19(H) 1.09(H) 1.25(H)   BUN 6.0 - 20.0 mg/dL - 16.0 17.9 14.7 16.3   WBC 4.0 - 11.0 10e3/uL 2.6(L) 2.6(L) 2.4(L) 2.1(L) 3.6(L)   Neutrophil % 61 - - - 74   ANEU 1.6 - 8.3 10e9/L - - - - -       Chemistries:   Recent Labs   Lab Test 12/08/22  0915   BUN 16.0   CR 1.06*   GFRESTIMATED 64   *     Lab Results   Component Value Date    A1C 5.7 11/02/2022     Recent Labs   Lab Test 12/08/22  0915   ALBUMIN 3.1*   BILITOTAL 0.4   ALKPHOS 109*   AST 16   ALT <5*     Urine Studies:  Recent Labs   Lab Test 11/19/22  0937   COLOR Yellow   APPEARANCE Slightly Cloudy*   URINEGLC Negative   URINEBILI Negative   URINEKETONE Negative   SG 1.017   UBLD Negative   URINEPH 5.5   PROTEIN 20*   NITRITE Positive*   LEUKEST Large*   RBCU 3*   WBCU >182*     No lab results found.  Hematology:   Recent Labs   Lab Test 12/08/22  0915 12/06/22  0630 12/05/22  0553   HGB 8.3* 7.6* 8.0*    176 159   WBC 2.6*  2.6* 2.4* 2.1*     Coags:   Recent Labs   Lab Test 11/04/22  0607 11/03/22  0850   INR 1.32* 1.33*     HLA antibodies:   No results found for: [id number], [id number], [id number], [id number]    Assessment: Susan Puckett is doing well s/p Liver Tx:  Issues we addressed during her visit include:    Plan:    1. Graft function: LFTs normalizing  2. Immunosuppression Management: No change tac 8-10, will watch given headache  .  Complexity of management:Low.  Contributing factors: headache  3. Headache- need follow-up with neurology  Followup: 1 week          Delbert Morgan MD/PhD

## 2025-08-16 ENCOUNTER — HEALTH MAINTENANCE LETTER (OUTPATIENT)
Age: 55
End: 2025-08-16

## 2025-08-28 ENCOUNTER — TELEPHONE (OUTPATIENT)
Dept: GASTROENTEROLOGY | Facility: CLINIC | Age: 55
End: 2025-08-28
Payer: COMMERCIAL

## (undated) DEVICE — DEFIB PRO-PADZ LVP LQD GEL ADULT 8900-2105-01

## (undated) DEVICE — ESU HANDPIECE ABC BEND-A-BEAM 6" 134006

## (undated) DEVICE — NDL COUNTER 40CT  31142311

## (undated) DEVICE — SUCTION MANIFOLD NEPTUNE 2 SYS 4 PORT 0702-020-000

## (undated) DEVICE — LINEN TOWEL PACK X30 5481

## (undated) DEVICE — SU PROLENE 6-0 C-1DA 30" 8706H

## (undated) DEVICE — SURGICEL ABSORBABLE HEMOSTAT SNOW 4"X4" 2083

## (undated) DEVICE — DRAPE FLUID WARMING 52 X 60" ORS-321

## (undated) DEVICE — TUBE NASOGASTRIC FEEDING ENTRIFLEX ENFIT 10FR 43 8884721088E

## (undated) DEVICE — SU PROLENE 7-0 BV-1DA 30" 8703H

## (undated) DEVICE — SU PROLENE 4-0 SHDA 36" 8521H

## (undated) DEVICE — CLIP APPLIER 13" LG LIGACLIP MCL20

## (undated) DEVICE — STPL SKIN 35W ROTATING HEAD PRW35

## (undated) DEVICE — ESU GROUND PAD ADULT W/CORD E7507

## (undated) DEVICE — DRSG PRIMAPORE 02X3" 7133

## (undated) DEVICE — RX SURGIFLO HEMOSTATIC MATRIX W/THROMBIN 8ML 2994

## (undated) DEVICE — CLIP APPLIER 09 3/8" SM LIGACLIP MCS20

## (undated) DEVICE — Device

## (undated) DEVICE — DRSG MEDIPORE 3 1/2X13 3/4" 3573

## (undated) DEVICE — SU SILK 1 TIE 6X30" A307H

## (undated) DEVICE — SU SILK 2-0 TIE 12X30" A305H

## (undated) DEVICE — TUBING IRRIG CYSTO/BLADDER SET 81" LF 2C4040

## (undated) DEVICE — DRAPE SHEET REV FOLD 3/4 9349

## (undated) DEVICE — PITCHER STERILE 1000ML  SSK9004A

## (undated) DEVICE — TUBE FEEDING NEOCONNECT POLY ENFIT 8FR 24" PFTM8.0P-NC

## (undated) DEVICE — SU PDS II 1 TP-1 48" Z880G

## (undated) DEVICE — SPONGE LAP 18X18" X8435

## (undated) DEVICE — LINEN TOWEL PACK X6 WHITE 5487

## (undated) DEVICE — SU SILK 0 TIE 6X30" A306H

## (undated) DEVICE — SURGICEL FIBRILLAR HEMOSTAT 4"X4" 1963

## (undated) DEVICE — SU PROLENE 5-0 C-1DA 36" 8720H

## (undated) DEVICE — DRAPE ISOLATION BAG 1003

## (undated) DEVICE — SU SILK 3-0 SH 30" K832H

## (undated) DEVICE — SYR 01ML 27GA 0.5" NDL TBC 309623

## (undated) DEVICE — STPL ENDO LINEAR CUT ECHELON GST 45MM COMPACT PCEE45A

## (undated) DEVICE — SU ETHILON 3-0 PS-1 18" 1663H

## (undated) DEVICE — SOL NACL 0.9% IRRIG 1000ML BOTTLE 2F7124

## (undated) DEVICE — SU PROLENE 3-0 SHDA 36" 8522H

## (undated) DEVICE — SUTURE PDS2 6-0 C-1 30IN 2 ARM MFL VIOL ABS

## (undated) DEVICE — SU VICRYL 3-0 SH 27" UND J416H

## (undated) DEVICE — SURGICEL HEMOSTAT 4X8" 1952

## (undated) DEVICE — SU SILK 4-0 TIE 12X30" A303H

## (undated) DEVICE — SU SILK 3-0 TIE 12X30" A304H

## (undated) DEVICE — DRAIN JACKSON PRATT RESERVOIR 400ML SU130-1000

## (undated) DEVICE — CLAMP BULLDOG VASCUSTATT II MINI STR YELLOW 1001-572

## (undated) DEVICE — TUBE FEEDING NEOCONNECT SIL ENFIT 5FR 40CM PFTS5.0S-NC

## (undated) DEVICE — CLIP APPLIER 11" MED LIGACLIP MCM30

## (undated) DEVICE — ESU LIGASURE IMPACT OPEN SEALER/DVDR CVD LG JAW LF4418

## (undated) DEVICE — BASIN SET SINGLE STERILE 13752-624

## (undated) DEVICE — CATH TRAY FOLEY SURESTEP 16FR W/TMP PRB STLK LATEX A319416AM

## (undated) DEVICE — ESU LIGASURE TRIVERSE 3MM FT3000

## (undated) DEVICE — INSERT FOGARTY 61MM TRACTION HYDRAJAW HYDRA61

## (undated) DEVICE — SUCTION TIP YANKAUER W/O VENT K86

## (undated) DEVICE — BLADE CLIPPER SGL USE 9680

## (undated) DEVICE — ESU GROUND PAD THERMOGUARD PLUS ABC PEDS 7-382

## (undated) DEVICE — DRSG TEGADERM 8X12" 1629

## (undated) DEVICE — SU SILK 3-0 SH CR 8X18" C013D

## (undated) DEVICE — GLOVE PROTEXIS BLUE W/NEU-THERA 8.0  2D73EB80

## (undated) DEVICE — CELL SAVER

## (undated) DEVICE — SU PROLENE 4-0 RB-1DA 36" 8557H

## (undated) DEVICE — GLOVE PROTEXIS MICRO 7.5  2D73PM75

## (undated) RX ORDER — ATROPINE SULFATE 0.4 MG/ML
AMPUL (ML) INJECTION
Status: DISPENSED
Start: 2020-10-20

## (undated) RX ORDER — ONDANSETRON 2 MG/ML
INJECTION INTRAMUSCULAR; INTRAVENOUS
Status: DISPENSED
Start: 2021-11-05

## (undated) RX ORDER — HEPARIN SODIUM 200 [USP'U]/100ML
INJECTION, SOLUTION INTRAVENOUS
Status: DISPENSED
Start: 2021-11-05

## (undated) RX ORDER — VERAPAMIL HYDROCHLORIDE 2.5 MG/ML
INJECTION, SOLUTION INTRAVENOUS
Status: DISPENSED
Start: 2022-11-02

## (undated) RX ORDER — AMPICILLIN AND SULBACTAM 2; 1 G/1; G/1
INJECTION, POWDER, FOR SOLUTION INTRAMUSCULAR; INTRAVENOUS
Status: DISPENSED
Start: 2021-11-05

## (undated) RX ORDER — DEXAMETHASONE SODIUM PHOSPHATE 4 MG/ML
INJECTION, SOLUTION INTRA-ARTICULAR; INTRALESIONAL; INTRAMUSCULAR; INTRAVENOUS; SOFT TISSUE
Status: DISPENSED
Start: 2021-11-05

## (undated) RX ORDER — PROPOFOL 10 MG/ML
INJECTION, EMULSION INTRAVENOUS
Status: DISPENSED
Start: 2021-11-05

## (undated) RX ORDER — LIDOCAINE HYDROCHLORIDE 10 MG/ML
INJECTION, SOLUTION EPIDURAL; INFILTRATION; INTRACAUDAL; PERINEURAL
Status: DISPENSED
Start: 2021-11-05

## (undated) RX ORDER — LIDOCAINE HYDROCHLORIDE 20 MG/ML
INJECTION, SOLUTION EPIDURAL; INFILTRATION; INTRACAUDAL; PERINEURAL
Status: DISPENSED
Start: 2021-11-05

## (undated) RX ORDER — FENTANYL CITRATE 50 UG/ML
INJECTION, SOLUTION INTRAMUSCULAR; INTRAVENOUS
Status: DISPENSED
Start: 2021-11-05

## (undated) RX ORDER — FENTANYL CITRATE-0.9 % NACL/PF 10 MCG/ML
PLASTIC BAG, INJECTION (ML) INTRAVENOUS
Status: DISPENSED
Start: 2021-11-05

## (undated) RX ORDER — DOBUTAMINE HYDROCHLORIDE 200 MG/100ML
INJECTION INTRAVENOUS
Status: DISPENSED
Start: 2020-10-20

## (undated) RX ORDER — METOPROLOL TARTRATE 1 MG/ML
INJECTION, SOLUTION INTRAVENOUS
Status: DISPENSED
Start: 2020-10-20

## (undated) RX ORDER — ROCURONIUM BROMIDE 50 MG/5 ML
SYRINGE (ML) INTRAVENOUS
Status: DISPENSED
Start: 2021-11-05